# Patient Record
Sex: MALE | Race: ASIAN | NOT HISPANIC OR LATINO | URBAN - METROPOLITAN AREA
[De-identification: names, ages, dates, MRNs, and addresses within clinical notes are randomized per-mention and may not be internally consistent; named-entity substitution may affect disease eponyms.]

---

## 2018-05-25 ENCOUNTER — INPATIENT (INPATIENT)
Facility: HOSPITAL | Age: 78
LOS: 1 days | Discharge: ANOTHER HOSPITAL | DRG: 305 | End: 2018-05-27
Attending: INTERNAL MEDICINE | Admitting: INTERNAL MEDICINE
Payer: MEDICARE

## 2018-05-25 VITALS
HEART RATE: 69 BPM | TEMPERATURE: 98 F | HEIGHT: 69 IN | RESPIRATION RATE: 18 BRPM | OXYGEN SATURATION: 98 % | SYSTOLIC BLOOD PRESSURE: 111 MMHG | DIASTOLIC BLOOD PRESSURE: 66 MMHG | WEIGHT: 199.96 LBS

## 2018-05-25 DIAGNOSIS — F10.10 ALCOHOL ABUSE, UNCOMPLICATED: ICD-10-CM

## 2018-05-25 DIAGNOSIS — E87.1 HYPO-OSMOLALITY AND HYPONATREMIA: ICD-10-CM

## 2018-05-25 DIAGNOSIS — Z98.890 OTHER SPECIFIED POSTPROCEDURAL STATES: Chronic | ICD-10-CM

## 2018-05-25 DIAGNOSIS — R07.9 CHEST PAIN, UNSPECIFIED: ICD-10-CM

## 2018-05-25 DIAGNOSIS — K74.60 UNSPECIFIED CIRRHOSIS OF LIVER: ICD-10-CM

## 2018-05-25 DIAGNOSIS — I10 ESSENTIAL (PRIMARY) HYPERTENSION: ICD-10-CM

## 2018-05-25 DIAGNOSIS — M19.90 UNSPECIFIED OSTEOARTHRITIS, UNSPECIFIED SITE: ICD-10-CM

## 2018-05-25 DIAGNOSIS — N39.0 URINARY TRACT INFECTION, SITE NOT SPECIFIED: ICD-10-CM

## 2018-05-25 DIAGNOSIS — Z29.9 ENCOUNTER FOR PROPHYLACTIC MEASURES, UNSPECIFIED: ICD-10-CM

## 2018-05-25 LAB
ALBUMIN SERPL ELPH-MCNC: 2.6 G/DL — LOW (ref 3.5–5)
ALP SERPL-CCNC: 122 U/L — HIGH (ref 40–120)
ALT FLD-CCNC: 36 U/L DA — SIGNIFICANT CHANGE UP (ref 10–60)
ANION GAP SERPL CALC-SCNC: 5 MMOL/L — SIGNIFICANT CHANGE UP (ref 5–17)
APPEARANCE UR: CLEAR — SIGNIFICANT CHANGE UP
APTT BLD: 32.5 SEC — SIGNIFICANT CHANGE UP (ref 27.5–37.4)
AST SERPL-CCNC: 36 U/L — SIGNIFICANT CHANGE UP (ref 10–40)
BACTERIA # UR AUTO: ABNORMAL /HPF
BILIRUB SERPL-MCNC: 1.5 MG/DL — HIGH (ref 0.2–1.2)
BILIRUB UR-MCNC: NEGATIVE — SIGNIFICANT CHANGE UP
BUN SERPL-MCNC: 21 MG/DL — HIGH (ref 7–18)
CALCIUM SERPL-MCNC: 9.3 MG/DL — SIGNIFICANT CHANGE UP (ref 8.4–10.5)
CHLORIDE SERPL-SCNC: 102 MMOL/L — SIGNIFICANT CHANGE UP (ref 96–108)
CK MB BLD-MCNC: <5.6 % — HIGH (ref 0–3.5)
CK MB CFR SERPL CALC: <1 NG/ML — SIGNIFICANT CHANGE UP (ref 0–3.6)
CK SERPL-CCNC: 18 U/L — LOW (ref 35–232)
CO2 SERPL-SCNC: 27 MMOL/L — SIGNIFICANT CHANGE UP (ref 22–31)
COLOR SPEC: YELLOW — SIGNIFICANT CHANGE UP
CREAT SERPL-MCNC: 1.3 MG/DL — SIGNIFICANT CHANGE UP (ref 0.5–1.3)
DIFF PNL FLD: ABNORMAL
EPI CELLS # UR: ABNORMAL /HPF
GLUCOSE SERPL-MCNC: 116 MG/DL — HIGH (ref 70–99)
GLUCOSE UR QL: NEGATIVE — SIGNIFICANT CHANGE UP
HCT VFR BLD CALC: 42.6 % — SIGNIFICANT CHANGE UP (ref 39–50)
HGB BLD-MCNC: 14 G/DL — SIGNIFICANT CHANGE UP (ref 13–17)
HYALINE CASTS # UR AUTO: ABNORMAL /LPF
INR BLD: 1.79 RATIO — HIGH (ref 0.88–1.16)
KETONES UR-MCNC: NEGATIVE — SIGNIFICANT CHANGE UP
LEUKOCYTE ESTERASE UR-ACNC: ABNORMAL
LIDOCAIN IGE QN: 63 U/L — LOW (ref 73–393)
LYMPHOCYTES # BLD AUTO: 6 % — LOW (ref 13–44)
MANUAL DIF COMMENT BLD-IMP: SIGNIFICANT CHANGE UP
MCHC RBC-ENTMCNC: 29.8 PG — SIGNIFICANT CHANGE UP (ref 27–34)
MCHC RBC-ENTMCNC: 32.8 GM/DL — SIGNIFICANT CHANGE UP (ref 32–36)
MCV RBC AUTO: 91 FL — SIGNIFICANT CHANGE UP (ref 80–100)
MONOCYTES NFR BLD AUTO: 2 % — SIGNIFICANT CHANGE UP (ref 2–14)
MYELOCYTES NFR BLD: 1 % — HIGH (ref 0–0)
NEUTROPHILS NFR BLD AUTO: 91 % — HIGH (ref 43–77)
NITRITE UR-MCNC: NEGATIVE — SIGNIFICANT CHANGE UP
NT-PROBNP SERPL-SCNC: 667 PG/ML — HIGH (ref 0–450)
OB PNL STL: NEGATIVE — SIGNIFICANT CHANGE UP
PH UR: 5 — SIGNIFICANT CHANGE UP (ref 5–8)
PLAT MORPH BLD: NORMAL — SIGNIFICANT CHANGE UP
PLATELET # BLD AUTO: 193 K/UL — SIGNIFICANT CHANGE UP (ref 150–400)
POTASSIUM SERPL-MCNC: 4.8 MMOL/L — SIGNIFICANT CHANGE UP (ref 3.5–5.3)
POTASSIUM SERPL-SCNC: 4.8 MMOL/L — SIGNIFICANT CHANGE UP (ref 3.5–5.3)
PROT SERPL-MCNC: 7.3 G/DL — SIGNIFICANT CHANGE UP (ref 6–8.3)
PROT UR-MCNC: 30 MG/DL
PROTHROM AB SERPL-ACNC: 19.8 SEC — HIGH (ref 9.8–12.7)
RBC # BLD: 4.69 M/UL — SIGNIFICANT CHANGE UP (ref 4.2–5.8)
RBC # FLD: 14.8 % — HIGH (ref 10.3–14.5)
RBC BLD AUTO: NORMAL — SIGNIFICANT CHANGE UP
RBC CASTS # UR COMP ASSIST: ABNORMAL /HPF (ref 0–2)
SODIUM SERPL-SCNC: 134 MMOL/L — LOW (ref 135–145)
SP GR SPEC: 1.01 — SIGNIFICANT CHANGE UP (ref 1.01–1.02)
TROPONIN I SERPL-MCNC: <0.015 NG/ML — SIGNIFICANT CHANGE UP (ref 0–0.04)
UROBILINOGEN FLD QL: 1
WBC # BLD: 21 K/UL — HIGH (ref 3.8–10.5)
WBC # FLD AUTO: 21 K/UL — HIGH (ref 3.8–10.5)
WBC UR QL: ABNORMAL /HPF (ref 0–5)

## 2018-05-25 PROCEDURE — 99285 EMERGENCY DEPT VISIT HI MDM: CPT

## 2018-05-25 PROCEDURE — 71045 X-RAY EXAM CHEST 1 VIEW: CPT | Mod: 26

## 2018-05-25 PROCEDURE — 74177 CT ABD & PELVIS W/CONTRAST: CPT | Mod: 26

## 2018-05-25 RX ORDER — SODIUM CHLORIDE 9 MG/ML
3 INJECTION INTRAMUSCULAR; INTRAVENOUS; SUBCUTANEOUS ONCE
Qty: 0 | Refills: 0 | Status: COMPLETED | OUTPATIENT
Start: 2018-05-25 | End: 2018-05-25

## 2018-05-25 RX ORDER — HEPARIN SODIUM 5000 [USP'U]/ML
5000 INJECTION INTRAVENOUS; SUBCUTANEOUS EVERY 8 HOURS
Qty: 0 | Refills: 0 | Status: DISCONTINUED | OUTPATIENT
Start: 2018-05-25 | End: 2018-05-26

## 2018-05-25 RX ORDER — FUROSEMIDE 40 MG
20 TABLET ORAL DAILY
Qty: 0 | Refills: 0 | Status: DISCONTINUED | OUTPATIENT
Start: 2018-05-25 | End: 2018-05-26

## 2018-05-25 RX ORDER — PANTOPRAZOLE SODIUM 20 MG/1
40 TABLET, DELAYED RELEASE ORAL
Qty: 0 | Refills: 0 | Status: DISCONTINUED | OUTPATIENT
Start: 2018-05-25 | End: 2018-05-26

## 2018-05-25 RX ORDER — PROPRANOLOL HCL 160 MG
10 CAPSULE, EXTENDED RELEASE 24HR ORAL DAILY
Qty: 0 | Refills: 0 | Status: DISCONTINUED | OUTPATIENT
Start: 2018-05-25 | End: 2018-05-26

## 2018-05-25 RX ORDER — LACTULOSE 10 G/15ML
15 SOLUTION ORAL THREE TIMES A DAY
Qty: 0 | Refills: 0 | Status: DISCONTINUED | OUTPATIENT
Start: 2018-05-25 | End: 2018-05-26

## 2018-05-25 RX ORDER — CEFTRIAXONE 500 MG/1
INJECTION, POWDER, FOR SOLUTION INTRAMUSCULAR; INTRAVENOUS
Qty: 0 | Refills: 0 | Status: DISCONTINUED | OUTPATIENT
Start: 2018-05-25 | End: 2018-05-26

## 2018-05-25 RX ORDER — CEFTRIAXONE 500 MG/1
1 INJECTION, POWDER, FOR SOLUTION INTRAMUSCULAR; INTRAVENOUS ONCE
Qty: 0 | Refills: 0 | Status: COMPLETED | OUTPATIENT
Start: 2018-05-25 | End: 2018-05-25

## 2018-05-25 RX ORDER — SODIUM CHLORIDE 9 MG/ML
1000 INJECTION INTRAMUSCULAR; INTRAVENOUS; SUBCUTANEOUS
Qty: 0 | Refills: 0 | Status: DISCONTINUED | OUTPATIENT
Start: 2018-05-25 | End: 2018-05-25

## 2018-05-25 RX ORDER — CEFTRIAXONE 500 MG/1
1 INJECTION, POWDER, FOR SOLUTION INTRAMUSCULAR; INTRAVENOUS EVERY 24 HOURS
Qty: 0 | Refills: 0 | Status: DISCONTINUED | OUTPATIENT
Start: 2018-05-26 | End: 2018-05-26

## 2018-05-25 RX ORDER — PIPERACILLIN AND TAZOBACTAM 4; .5 G/20ML; G/20ML
3.38 INJECTION, POWDER, LYOPHILIZED, FOR SOLUTION INTRAVENOUS ONCE
Qty: 0 | Refills: 0 | Status: COMPLETED | OUTPATIENT
Start: 2018-05-25 | End: 2018-05-25

## 2018-05-25 RX ADMIN — PIPERACILLIN AND TAZOBACTAM 200 GRAM(S): 4; .5 INJECTION, POWDER, LYOPHILIZED, FOR SOLUTION INTRAVENOUS at 20:06

## 2018-05-25 RX ADMIN — CEFTRIAXONE 100 GRAM(S): 500 INJECTION, POWDER, FOR SOLUTION INTRAMUSCULAR; INTRAVENOUS at 23:25

## 2018-05-25 RX ADMIN — SODIUM CHLORIDE 3 MILLILITER(S): 9 INJECTION INTRAMUSCULAR; INTRAVENOUS; SUBCUTANEOUS at 17:48

## 2018-05-25 RX ADMIN — SODIUM CHLORIDE 125 MILLILITER(S): 9 INJECTION INTRAMUSCULAR; INTRAVENOUS; SUBCUTANEOUS at 17:48

## 2018-05-25 NOTE — ED PROVIDER NOTE - CARE PLAN
Principal Discharge DX:	Chest pain  Secondary Diagnosis:	Abdominal pain Principal Discharge DX:	Chest pain  Secondary Diagnosis:	Abdominal pain  Secondary Diagnosis:	Diverticulitis

## 2018-05-25 NOTE — H&P ADULT - NSHPPHYSICALEXAM_GEN_ALL_CORE
PHYSICAL EXAM:  GENERAL: NAD, obese  HEAD:  Atraumatic, Normocephalic  EYES:  Mild scleral icterus, clear conjunctiva   NECK: Supple, No JVD, Normal thyroid  CHEST/LUNG: Chest wall tenderness, Clear to auscultation. Clear to percussion bilaterally; No rales, rhonchi, wheezing, or rubs  HEART: Regular rate and rhythm; No murmurs, rubs, or gallops  ABDOMEN: Soft, Distended, LLQ, suprapubic and RLQ tenderness, Bowel sounds present, HSM cannot be appreciated due to distended abdomen, Fluid thrill positive  NERVOUS SYSTEM:  Alert & Oriented X3,    EXTREMITIES:  2+ Peripheral Pulses, No clubbing, cyanosis, or edema  SKIN: warm dry

## 2018-05-25 NOTE — H&P ADULT - PROBLEM SELECTOR PLAN 5
Last AlcoHol use was 4 weeks ago,  Not in withdrawal , no need for CIWA protocol  Follow up vitamin B12, Folate, phosphorus levels

## 2018-05-25 NOTE — ED PROVIDER NOTE - MUSCULOSKELETAL, MLM
Spine appears normal, range of motion is not limited, no muscle or joint tenderness, no CVAT, straight leg-90 deg

## 2018-05-25 NOTE — H&P ADULT - PROBLEM SELECTOR PLAN 2
Patient has history of cirrhosis and paracentesis, now presenting with worsening increased abdominal girth/ distention, on PE: labs shows high PT/INR, increased bilirubin and increased Alk phosphate-- likely worsening liver cirrhosis  -MELD score  -MEDREY score:  -On rocephin for concerns of SBO, as patient has elevated leukocytosis,  F/u cmp, Monitor for worsening abdominal distention  -c/w propanolol and lasix home dose  F/u CT abdomen with oral and IV contrast Patient has history of cirrhosis and paracentesis, now presenting with worsening increased abdominal girth/ distention, on PE: labs shows high PT/INR, increased bilirubin and increased Alk phosphate-- likely worsening liver cirrhosis  -MELD score: 19 points  -MADDREY'S DISCRIMINANT FUNCTION SCORE: 1.5 points Good prognosis, no need for steroid administration now  -On rocephin for concerns of SBO, as patient has elevated leukocytosis,  F/u cmp, Monitor for worsening abdominal distention  -c/w propanolol and lasix home dose  F/u CT abdomen with oral and IV contrast Patient has history of AlCohol induced cirrhosis and paracentesis, now presenting with worsening increased abdominal girth/ distention, on PE: labs shows high PT/INR, increased bilirubin and increased Alk phosphate-- likely worsening liver cirrhosis  -MELD score: 19 points  -MADDREY'S DISCRIMINANT FUNCTION SCORE: 1.5 points Good prognosis, no need for steroid administration now  -On rocephin for concerns of SBO, as patient has elevated leukocytosis,  F/u cmp, Monitor for worsening abdominal distention  -c/w propanolol and lasix home dose  F/u CT abdomen with oral and IV contrast

## 2018-05-25 NOTE — H&P ADULT - HISTORY OF PRESENT ILLNESS
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 It is associated with chronic worsening abdominal distention, dizziness.,. He also reports midsternal chest pain heart burn like pain, associated with sour taste, anorexia, chronic constipation. He denies fever, chills, headache, nausea, vomiting, palpitations, dyspnea, cough or palpitations, dysuria, hematuria, burning micturation. Patient states he was having chronic back pain since many days, for which he went to PCP office where his Occult stool was positive and he was being scheduled for Colonoscopy next week. He quit alcoHol many years but started drinking scotch again one month after his sister  from ovarian cancer.    In ED, Patient's vital signs were stable, Labs were remarkable for PT/INR 19.8/1.79, WBC of 21.0, sodium of 134, Total Bilirubin: 1.5, ALK phosphate: 122, UA was grossly positive for UTI, CXR shows "4 mm nodular opacity projecting over the left lateral lung base; unclear if this represents a calcified granuloma or nodule" When patient was seen by me, he was lying comfortably but was in mild distress due to pain 78 yr old M from home, Cara/ English speaking, walks with cane, with PMH of GBS (many years ago, s/p tracheostomy) , cirrhosis( diagnosed multiple years ago, s/p Paracentesis x1), ALcohol abuse, Htn, chronic pedal edema ( on furosemide), osteoarthritis, b/l inguinal hernia ( s/p surgical repair), B/l ankle fracture ( s/p metallic rods placement), chronic back pain came with c/o of B/l lower abdominal pain started 2 days ago, Patient states he developed lower abdominal pain which was gradual in onset, on LLQ and RLQ, burning in character, 5-10 in intensity, intermittent, have no radiation, aggravating/ alleviating factors. It is associated with chronic worsening abdominal distention, dizziness.,. He also reports midsternal chest pain heart burn like pain, associated with sour taste, anorexia, chronic constipation. He denies fever, chills, headache, nausea, vomiting, palpitations, dyspnea, cough or palpitations, dysuria, hematuria, burning micturation. Patient states he was having chronic back pain since many days, for which he went to PCP office where his Occult stool was positive and he was being scheduled for Colonoscopy next week. He quit alcoHol many years but started drinking scotch again few months after his sister  from ovarian cancer, Last alCohol intake is 4 weeks ago    In ED, Patient's vital signs were stable, Labs were remarkable for PT/INR 19.8/1.79, WBC of 21.0, sodium of 134, Total Bilirubin: 1.5, ALK phosphate: 122, UA was grossly positive for UTI, CXR shows "4 mm nodular opacity projecting over the left lateral lung base; unclear if this represents a calcified granuloma or nodule" When patient was seen by me, he was lying comfortably but was in mild distress due to pain

## 2018-05-25 NOTE — ED PROVIDER NOTE - OBJECTIVE STATEMENT
78 y.o. male with h/o ETOH abuse, stopped drinking alcohol for past 7-8yrs., but pt started drinking scotch 2-3 mos ago, last drank 4 weeks ago, c/o constant LLQ pain for past 2-3 days, radiated to RLQ, no n/v, last BM this min amount, pt takes Lactulose for constipation, decreased appetite, no fever, Pt also c/o MSCP for past 2-3 days, on & off, assoc with sob, @ times dizziness, no palpitation, pt saw PMD 3 weeks ago, told with occult blood, poss for colonoscopy next week.  Pt with Lt lower back pain for past 6 mos., worse with movement

## 2018-05-25 NOTE — ED PROVIDER NOTE - MEDICAL DECISION MAKING DETAILS
pt with mult complaints, chest pain, lower abd pain, h/o cirrhosis, concern for diverticulitis, ACS, will get labs, CT, admission

## 2018-05-25 NOTE — ED PROVIDER NOTE - PROGRESS NOTE DETAILS
pt with leukocytosis, worrisome for diverticulitis, chest pain, will admit pt.  Case d/w Dr. Rome pt with leukocytosis, worrisome for diverticulitis, chest pain, will admit pt.  Case d/w Dr. Rome.  CT A/P pending

## 2018-05-25 NOTE — H&P ADULT - PROBLEM SELECTOR PLAN 6
Serum Sodium is 134--> likely dilutional/ hypervolemic hyponatremia  Continue to monitor BMP  F/u Urine electrolytes

## 2018-05-25 NOTE — H&P ADULT - PROBLEM SELECTOR PLAN 1
Patient presented with RLQ and LLQ pain with Lower abdomen and suprapubic tenderness on PE, leukocytosis, UA grossly positive-- likely UTI,  F/u Urine culture  s/p zosyn and levaquin  started rocephin  F/u CT abdomen with oral and IV contrast

## 2018-05-25 NOTE — H&P ADULT - PROBLEM SELECTOR PLAN 8
RISK                                                          Points  [  ] Previous VTE                                                3  [  ] Thrombophilia                                             2  [  ] Lower limb paralysis                                   2        (unable to hold up >15 seconds)    [  ] Current Cancer                                             2         (within 6 months)  [ x ] Immobilization > 24 hrs                              1  [  ] ICU/CCU stay > 24 hours                             1  [ x ] Age > 60                                                         1    IMPROVE VTE Score: 2  heparin for VTE prophylaxis.

## 2018-05-26 DIAGNOSIS — K92.2 GASTROINTESTINAL HEMORRHAGE, UNSPECIFIED: ICD-10-CM

## 2018-05-26 DIAGNOSIS — I10 ESSENTIAL (PRIMARY) HYPERTENSION: ICD-10-CM

## 2018-05-26 DIAGNOSIS — K70.30 ALCOHOLIC CIRRHOSIS OF LIVER WITHOUT ASCITES: ICD-10-CM

## 2018-05-26 DIAGNOSIS — N39.0 URINARY TRACT INFECTION, SITE NOT SPECIFIED: ICD-10-CM

## 2018-05-26 PROBLEM — Z00.00 ENCOUNTER FOR PREVENTIVE HEALTH EXAMINATION: Status: ACTIVE | Noted: 2018-05-26

## 2018-05-26 LAB
ABO RH CONFIRMATION: SIGNIFICANT CHANGE UP
ALBUMIN SERPL ELPH-MCNC: 1.6 G/DL — LOW (ref 3.5–5)
ALBUMIN SERPL ELPH-MCNC: 2.4 G/DL — LOW (ref 3.5–5)
ALBUMIN SERPL ELPH-MCNC: 2.4 G/DL — LOW (ref 3.5–5)
ALP SERPL-CCNC: 106 U/L — SIGNIFICANT CHANGE UP (ref 40–120)
ALP SERPL-CCNC: 120 U/L — SIGNIFICANT CHANGE UP (ref 40–120)
ALP SERPL-CCNC: 32 U/L — LOW (ref 40–120)
ALT FLD-CCNC: 21 U/L DA — SIGNIFICANT CHANGE UP (ref 10–60)
ALT FLD-CCNC: 28 U/L DA — SIGNIFICANT CHANGE UP (ref 10–60)
ALT FLD-CCNC: 32 U/L DA — SIGNIFICANT CHANGE UP (ref 10–60)
ANION GAP SERPL CALC-SCNC: 10 MMOL/L — SIGNIFICANT CHANGE UP (ref 5–17)
ANION GAP SERPL CALC-SCNC: 7 MMOL/L — SIGNIFICANT CHANGE UP (ref 5–17)
ANION GAP SERPL CALC-SCNC: 8 MMOL/L — SIGNIFICANT CHANGE UP (ref 5–17)
ANION GAP SERPL CALC-SCNC: 8 MMOL/L — SIGNIFICANT CHANGE UP (ref 5–17)
APTT BLD: 34.6 SEC — SIGNIFICANT CHANGE UP (ref 27.5–37.4)
AST SERPL-CCNC: 21 U/L — SIGNIFICANT CHANGE UP (ref 10–40)
AST SERPL-CCNC: 29 U/L — SIGNIFICANT CHANGE UP (ref 10–40)
AST SERPL-CCNC: 32 U/L — SIGNIFICANT CHANGE UP (ref 10–40)
BASE EXCESS BLDV CALC-SCNC: -5.4 MMOL/L — LOW (ref -2–2)
BASOPHILS # BLD AUTO: 0 K/UL — SIGNIFICANT CHANGE UP (ref 0–0.2)
BASOPHILS # BLD AUTO: 0 K/UL — SIGNIFICANT CHANGE UP (ref 0–0.2)
BASOPHILS # BLD AUTO: 0.1 K/UL — SIGNIFICANT CHANGE UP (ref 0–0.2)
BASOPHILS # BLD AUTO: 0.1 K/UL — SIGNIFICANT CHANGE UP (ref 0–0.2)
BASOPHILS NFR BLD AUTO: 0.3 % — SIGNIFICANT CHANGE UP (ref 0–2)
BASOPHILS NFR BLD AUTO: 1.3 % — SIGNIFICANT CHANGE UP (ref 0–2)
BILIRUB SERPL-MCNC: 0.7 MG/DL — SIGNIFICANT CHANGE UP (ref 0.2–1.2)
BILIRUB SERPL-MCNC: 0.8 MG/DL — SIGNIFICANT CHANGE UP (ref 0.2–1.2)
BILIRUB SERPL-MCNC: 1.4 MG/DL — HIGH (ref 0.2–1.2)
BLOOD GAS COMMENTS, VENOUS: SIGNIFICANT CHANGE UP
BUN SERPL-MCNC: 22 MG/DL — HIGH (ref 7–18)
BUN SERPL-MCNC: 24 MG/DL — HIGH (ref 7–18)
BUN SERPL-MCNC: 33 MG/DL — HIGH (ref 7–18)
BUN SERPL-MCNC: 34 MG/DL — HIGH (ref 7–18)
CALCIUM SERPL-MCNC: 6.9 MG/DL — LOW (ref 8.4–10.5)
CALCIUM SERPL-MCNC: 7.7 MG/DL — LOW (ref 8.4–10.5)
CALCIUM SERPL-MCNC: 8.7 MG/DL — SIGNIFICANT CHANGE UP (ref 8.4–10.5)
CALCIUM SERPL-MCNC: 8.9 MG/DL — SIGNIFICANT CHANGE UP (ref 8.4–10.5)
CHLORIDE SERPL-SCNC: 102 MMOL/L — SIGNIFICANT CHANGE UP (ref 96–108)
CHLORIDE SERPL-SCNC: 103 MMOL/L — SIGNIFICANT CHANGE UP (ref 96–108)
CHLORIDE SERPL-SCNC: 109 MMOL/L — HIGH (ref 96–108)
CHLORIDE SERPL-SCNC: 114 MMOL/L — HIGH (ref 96–108)
CHOLEST SERPL-MCNC: 138 MG/DL — SIGNIFICANT CHANGE UP (ref 10–199)
CK MB BLD-MCNC: <3.7 % — HIGH (ref 0–3.5)
CK MB BLD-MCNC: <5 % — HIGH (ref 0–3.5)
CK MB CFR SERPL CALC: <1 NG/ML — SIGNIFICANT CHANGE UP (ref 0–3.6)
CK MB CFR SERPL CALC: <1 NG/ML — SIGNIFICANT CHANGE UP (ref 0–3.6)
CK SERPL-CCNC: 20 U/L — LOW (ref 35–232)
CK SERPL-CCNC: 27 U/L — LOW (ref 35–232)
CO2 SERPL-SCNC: 18 MMOL/L — LOW (ref 22–31)
CO2 SERPL-SCNC: 23 MMOL/L — SIGNIFICANT CHANGE UP (ref 22–31)
CO2 SERPL-SCNC: 24 MMOL/L — SIGNIFICANT CHANGE UP (ref 22–31)
CO2 SERPL-SCNC: 24 MMOL/L — SIGNIFICANT CHANGE UP (ref 22–31)
CREAT SERPL-MCNC: 1.1 MG/DL — SIGNIFICANT CHANGE UP (ref 0.5–1.3)
CREAT SERPL-MCNC: 1.23 MG/DL — SIGNIFICANT CHANGE UP (ref 0.5–1.3)
CREAT SERPL-MCNC: 1.51 MG/DL — HIGH (ref 0.5–1.3)
CREAT SERPL-MCNC: 1.66 MG/DL — HIGH (ref 0.5–1.3)
EOSINOPHIL # BLD AUTO: 0 K/UL — SIGNIFICANT CHANGE UP (ref 0–0.5)
EOSINOPHIL # BLD AUTO: 0.1 K/UL — SIGNIFICANT CHANGE UP (ref 0–0.5)
EOSINOPHIL NFR BLD AUTO: 0 % — SIGNIFICANT CHANGE UP (ref 0–6)
EOSINOPHIL NFR BLD AUTO: 0 % — SIGNIFICANT CHANGE UP (ref 0–6)
EOSINOPHIL NFR BLD AUTO: 0.2 % — SIGNIFICANT CHANGE UP (ref 0–6)
EOSINOPHIL NFR BLD AUTO: 1.1 % — SIGNIFICANT CHANGE UP (ref 0–6)
FOLATE SERPL-MCNC: 14.9 NG/ML — SIGNIFICANT CHANGE UP
GLUCOSE SERPL-MCNC: 119 MG/DL — HIGH (ref 70–99)
GLUCOSE SERPL-MCNC: 142 MG/DL — HIGH (ref 70–99)
GLUCOSE SERPL-MCNC: 143 MG/DL — HIGH (ref 70–99)
GLUCOSE SERPL-MCNC: 159 MG/DL — HIGH (ref 70–99)
HAV IGM SER-ACNC: SIGNIFICANT CHANGE UP
HBA1C BLD-MCNC: 5.4 % — SIGNIFICANT CHANGE UP (ref 4–5.6)
HBV CORE IGM SER-ACNC: SIGNIFICANT CHANGE UP
HBV SURFACE AG SER-ACNC: SIGNIFICANT CHANGE UP
HCO3 BLDV-SCNC: 19 MMOL/L — LOW (ref 21–29)
HCT VFR BLD CALC: 26.4 % — LOW (ref 39–50)
HCT VFR BLD CALC: 27.4 % — LOW (ref 39–50)
HCT VFR BLD CALC: 32.7 % — LOW (ref 39–50)
HCT VFR BLD CALC: 38.4 % — LOW (ref 39–50)
HCT VFR BLD CALC: 43.2 % — SIGNIFICANT CHANGE UP (ref 39–50)
HCV AB S/CO SERPL IA: 0.08 S/CO — SIGNIFICANT CHANGE UP
HCV AB SERPL-IMP: SIGNIFICANT CHANGE UP
HDLC SERPL-MCNC: 39 MG/DL — LOW (ref 40–125)
HGB BLD-MCNC: 10.3 G/DL — LOW (ref 13–17)
HGB BLD-MCNC: 12.3 G/DL — LOW (ref 13–17)
HGB BLD-MCNC: 14 G/DL — SIGNIFICANT CHANGE UP (ref 13–17)
HGB BLD-MCNC: 8.5 G/DL — LOW (ref 13–17)
HGB BLD-MCNC: 9.1 G/DL — LOW (ref 13–17)
HOROWITZ INDEX BLDV+IHG-RTO: 21 — SIGNIFICANT CHANGE UP
INR BLD: 2.34 RATIO — HIGH (ref 0.88–1.16)
LACTATE SERPL-SCNC: 1.8 MMOL/L — SIGNIFICANT CHANGE UP (ref 0.7–2)
LIPID PNL WITH DIRECT LDL SERPL: 86 MG/DL — SIGNIFICANT CHANGE UP
LYMPHOCYTES # BLD AUTO: 0.5 K/UL — LOW (ref 1–3.3)
LYMPHOCYTES # BLD AUTO: 0.8 K/UL — LOW (ref 1–3.3)
LYMPHOCYTES # BLD AUTO: 1 K/UL — SIGNIFICANT CHANGE UP (ref 1–3.3)
LYMPHOCYTES # BLD AUTO: 1.1 K/UL — SIGNIFICANT CHANGE UP (ref 1–3.3)
LYMPHOCYTES # BLD AUTO: 2.7 % — LOW (ref 13–44)
LYMPHOCYTES # BLD AUTO: 6.2 % — LOW (ref 13–44)
LYMPHOCYTES # BLD AUTO: 6.5 % — LOW (ref 13–44)
LYMPHOCYTES # BLD AUTO: 8.9 % — LOW (ref 13–44)
MAGNESIUM SERPL-MCNC: 1.5 MG/DL — LOW (ref 1.6–2.6)
MAGNESIUM SERPL-MCNC: 1.6 MG/DL — SIGNIFICANT CHANGE UP (ref 1.6–2.6)
MAGNESIUM SERPL-MCNC: 1.9 MG/DL — SIGNIFICANT CHANGE UP (ref 1.6–2.6)
MCHC RBC-ENTMCNC: 28.8 PG — SIGNIFICANT CHANGE UP (ref 27–34)
MCHC RBC-ENTMCNC: 29.1 PG — SIGNIFICANT CHANGE UP (ref 27–34)
MCHC RBC-ENTMCNC: 29.1 PG — SIGNIFICANT CHANGE UP (ref 27–34)
MCHC RBC-ENTMCNC: 29.3 PG — SIGNIFICANT CHANGE UP (ref 27–34)
MCHC RBC-ENTMCNC: 31.1 PG — SIGNIFICANT CHANGE UP (ref 27–34)
MCHC RBC-ENTMCNC: 31.4 GM/DL — LOW (ref 32–36)
MCHC RBC-ENTMCNC: 32.1 GM/DL — SIGNIFICANT CHANGE UP (ref 32–36)
MCHC RBC-ENTMCNC: 32.2 GM/DL — SIGNIFICANT CHANGE UP (ref 32–36)
MCHC RBC-ENTMCNC: 32.4 GM/DL — SIGNIFICANT CHANGE UP (ref 32–36)
MCHC RBC-ENTMCNC: 33.2 GM/DL — SIGNIFICANT CHANGE UP (ref 32–36)
MCV RBC AUTO: 90.5 FL — SIGNIFICANT CHANGE UP (ref 80–100)
MCV RBC AUTO: 90.5 FL — SIGNIFICANT CHANGE UP (ref 80–100)
MCV RBC AUTO: 90.6 FL — SIGNIFICANT CHANGE UP (ref 80–100)
MCV RBC AUTO: 91.8 FL — SIGNIFICANT CHANGE UP (ref 80–100)
MCV RBC AUTO: 93.6 FL — SIGNIFICANT CHANGE UP (ref 80–100)
MONOCYTES # BLD AUTO: 0.7 K/UL — SIGNIFICANT CHANGE UP (ref 0–0.9)
MONOCYTES # BLD AUTO: 0.7 K/UL — SIGNIFICANT CHANGE UP (ref 0–0.9)
MONOCYTES # BLD AUTO: 0.9 K/UL — SIGNIFICANT CHANGE UP (ref 0–0.9)
MONOCYTES # BLD AUTO: 1.1 K/UL — HIGH (ref 0–0.9)
MONOCYTES NFR BLD AUTO: 4.1 % — SIGNIFICANT CHANGE UP (ref 2–14)
MONOCYTES NFR BLD AUTO: 6 % — SIGNIFICANT CHANGE UP (ref 2–14)
MONOCYTES NFR BLD AUTO: 6.9 % — SIGNIFICANT CHANGE UP (ref 2–14)
MONOCYTES NFR BLD AUTO: 7.4 % — SIGNIFICANT CHANGE UP (ref 2–14)
NEUTROPHILS # BLD AUTO: 10.6 K/UL — HIGH (ref 1.8–7.4)
NEUTROPHILS # BLD AUTO: 14.2 K/UL — HIGH (ref 1.8–7.4)
NEUTROPHILS # BLD AUTO: 16.8 K/UL — HIGH (ref 1.8–7.4)
NEUTROPHILS # BLD AUTO: 9.2 K/UL — HIGH (ref 1.8–7.4)
NEUTROPHILS NFR BLD AUTO: 82.7 % — HIGH (ref 43–77)
NEUTROPHILS NFR BLD AUTO: 86.2 % — HIGH (ref 43–77)
NEUTROPHILS NFR BLD AUTO: 86.3 % — HIGH (ref 43–77)
NEUTROPHILS NFR BLD AUTO: 92.7 % — HIGH (ref 43–77)
PCO2 BLDV: 33 MMHG — LOW (ref 35–50)
PH BLDV: 7.37 — SIGNIFICANT CHANGE UP (ref 7.35–7.45)
PHOSPHATE SERPL-MCNC: 1.7 MG/DL — LOW (ref 2.5–4.5)
PHOSPHATE SERPL-MCNC: 1.8 MG/DL — LOW (ref 2.5–4.5)
PHOSPHATE SERPL-MCNC: 2 MG/DL — LOW (ref 2.5–4.5)
PLATELET # BLD AUTO: 157 K/UL — SIGNIFICANT CHANGE UP (ref 150–400)
PLATELET # BLD AUTO: 171 K/UL — SIGNIFICANT CHANGE UP (ref 150–400)
PLATELET # BLD AUTO: 171 K/UL — SIGNIFICANT CHANGE UP (ref 150–400)
PLATELET # BLD AUTO: 205 K/UL — SIGNIFICANT CHANGE UP (ref 150–400)
PLATELET # BLD AUTO: 233 K/UL — SIGNIFICANT CHANGE UP (ref 150–400)
PO2 BLDV: <43 MMHG — SIGNIFICANT CHANGE UP (ref 25–45)
POTASSIUM SERPL-MCNC: 3.1 MMOL/L — LOW (ref 3.5–5.3)
POTASSIUM SERPL-MCNC: 4.3 MMOL/L — SIGNIFICANT CHANGE UP (ref 3.5–5.3)
POTASSIUM SERPL-MCNC: 4.6 MMOL/L — SIGNIFICANT CHANGE UP (ref 3.5–5.3)
POTASSIUM SERPL-MCNC: 5.4 MMOL/L — HIGH (ref 3.5–5.3)
POTASSIUM SERPL-SCNC: 3.1 MMOL/L — LOW (ref 3.5–5.3)
POTASSIUM SERPL-SCNC: 4.3 MMOL/L — SIGNIFICANT CHANGE UP (ref 3.5–5.3)
POTASSIUM SERPL-SCNC: 4.6 MMOL/L — SIGNIFICANT CHANGE UP (ref 3.5–5.3)
POTASSIUM SERPL-SCNC: 5.4 MMOL/L — HIGH (ref 3.5–5.3)
PROT SERPL-MCNC: 4.9 G/DL — LOW (ref 6–8.3)
PROT SERPL-MCNC: 5.4 G/DL — LOW (ref 6–8.3)
PROT SERPL-MCNC: 7.1 G/DL — SIGNIFICANT CHANGE UP (ref 6–8.3)
PROTHROM AB SERPL-ACNC: 25.9 SEC — HIGH (ref 9.8–12.7)
RBC # BLD: 2.92 M/UL — LOW (ref 4.2–5.8)
RBC # BLD: 2.92 M/UL — LOW (ref 4.2–5.8)
RBC # BLD: 3.56 M/UL — LOW (ref 4.2–5.8)
RBC # BLD: 4.24 M/UL — SIGNIFICANT CHANGE UP (ref 4.2–5.8)
RBC # BLD: 4.77 M/UL — SIGNIFICANT CHANGE UP (ref 4.2–5.8)
RBC # FLD: 12.8 % — SIGNIFICANT CHANGE UP (ref 10.3–14.5)
RBC # FLD: 14.1 % — SIGNIFICANT CHANGE UP (ref 10.3–14.5)
RBC # FLD: 14.4 % — SIGNIFICANT CHANGE UP (ref 10.3–14.5)
SAO2 % BLDV: 74 % — SIGNIFICANT CHANGE UP (ref 67–88)
SODIUM SERPL-SCNC: 134 MMOL/L — LOW (ref 135–145)
SODIUM SERPL-SCNC: 135 MMOL/L — SIGNIFICANT CHANGE UP (ref 135–145)
SODIUM SERPL-SCNC: 137 MMOL/L — SIGNIFICANT CHANGE UP (ref 135–145)
SODIUM SERPL-SCNC: 144 MMOL/L — SIGNIFICANT CHANGE UP (ref 135–145)
TOTAL CHOLESTEROL/HDL RATIO MEASUREMENT: 3.5 RATIO — SIGNIFICANT CHANGE UP (ref 3.4–9.6)
TRIGL SERPL-MCNC: 64 MG/DL — SIGNIFICANT CHANGE UP (ref 10–149)
TROPONIN I SERPL-MCNC: <0.015 NG/ML — SIGNIFICANT CHANGE UP (ref 0–0.04)
TROPONIN I SERPL-MCNC: <0.015 NG/ML — SIGNIFICANT CHANGE UP (ref 0–0.04)
TSH SERPL-MCNC: 0.87 UU/ML — SIGNIFICANT CHANGE UP (ref 0.34–4.82)
VIT B12 SERPL-MCNC: 700 PG/ML — SIGNIFICANT CHANGE UP (ref 232–1245)
WBC # BLD: 11.2 K/UL — HIGH (ref 3.8–10.5)
WBC # BLD: 12.3 K/UL — HIGH (ref 3.8–10.5)
WBC # BLD: 16.4 K/UL — HIGH (ref 3.8–10.5)
WBC # BLD: 18.1 K/UL — HIGH (ref 3.8–10.5)
WBC # BLD: 20.1 K/UL — HIGH (ref 3.8–10.5)
WBC # FLD AUTO: 11.2 K/UL — HIGH (ref 3.8–10.5)
WBC # FLD AUTO: 12.3 K/UL — HIGH (ref 3.8–10.5)
WBC # FLD AUTO: 16.4 K/UL — HIGH (ref 3.8–10.5)
WBC # FLD AUTO: 18.1 K/UL — HIGH (ref 3.8–10.5)
WBC # FLD AUTO: 20.1 K/UL — HIGH (ref 3.8–10.5)

## 2018-05-26 PROCEDURE — 71045 X-RAY EXAM CHEST 1 VIEW: CPT | Mod: 26

## 2018-05-26 PROCEDURE — 99223 1ST HOSP IP/OBS HIGH 75: CPT

## 2018-05-26 PROCEDURE — 99291 CRITICAL CARE FIRST HOUR: CPT

## 2018-05-26 PROCEDURE — 74019 RADEX ABDOMEN 2 VIEWS: CPT | Mod: 26

## 2018-05-26 RX ORDER — PANTOPRAZOLE SODIUM 20 MG/1
80 TABLET, DELAYED RELEASE ORAL ONCE
Qty: 0 | Refills: 0 | Status: COMPLETED | OUTPATIENT
Start: 2018-05-26 | End: 2018-05-26

## 2018-05-26 RX ORDER — PANTOPRAZOLE SODIUM 20 MG/1
40 TABLET, DELAYED RELEASE ORAL
Qty: 0 | Refills: 0 | Status: DISCONTINUED | OUTPATIENT
Start: 2018-05-26 | End: 2018-05-26

## 2018-05-26 RX ORDER — PANTOPRAZOLE SODIUM 20 MG/1
8 TABLET, DELAYED RELEASE ORAL
Qty: 80 | Refills: 0 | Status: DISCONTINUED | OUTPATIENT
Start: 2018-05-26 | End: 2018-05-27

## 2018-05-26 RX ORDER — POTASSIUM PHOSPHATE, MONOBASIC POTASSIUM PHOSPHATE, DIBASIC 236; 224 MG/ML; MG/ML
15 INJECTION, SOLUTION INTRAVENOUS ONCE
Qty: 0 | Refills: 0 | Status: DISCONTINUED | OUTPATIENT
Start: 2018-05-26 | End: 2018-05-26

## 2018-05-26 RX ORDER — ENOXAPARIN SODIUM 100 MG/ML
100 INJECTION SUBCUTANEOUS EVERY 12 HOURS
Qty: 0 | Refills: 0 | Status: DISCONTINUED | OUTPATIENT
Start: 2018-05-26 | End: 2018-05-26

## 2018-05-26 RX ORDER — DEXTROSE 50 % IN WATER 50 %
50 SYRINGE (ML) INTRAVENOUS ONCE
Qty: 0 | Refills: 0 | Status: COMPLETED | OUTPATIENT
Start: 2018-05-26 | End: 2018-05-26

## 2018-05-26 RX ORDER — DEXTROSE MONOHYDRATE, SODIUM CHLORIDE, AND POTASSIUM CHLORIDE 50; .745; 4.5 G/1000ML; G/1000ML; G/1000ML
1000 INJECTION, SOLUTION INTRAVENOUS
Qty: 0 | Refills: 0 | Status: DISCONTINUED | OUTPATIENT
Start: 2018-05-26 | End: 2018-05-26

## 2018-05-26 RX ORDER — METOCLOPRAMIDE HCL 10 MG
10 TABLET ORAL ONCE
Qty: 0 | Refills: 0 | Status: COMPLETED | OUTPATIENT
Start: 2018-05-26 | End: 2018-05-26

## 2018-05-26 RX ORDER — SODIUM CHLORIDE 9 MG/ML
1000 INJECTION INTRAMUSCULAR; INTRAVENOUS; SUBCUTANEOUS ONCE
Qty: 0 | Refills: 0 | Status: COMPLETED | OUTPATIENT
Start: 2018-05-26 | End: 2018-05-26

## 2018-05-26 RX ORDER — IPRATROPIUM/ALBUTEROL SULFATE 18-103MCG
3 AEROSOL WITH ADAPTER (GRAM) INHALATION ONCE
Qty: 0 | Refills: 0 | Status: COMPLETED | OUTPATIENT
Start: 2018-05-26 | End: 2018-05-26

## 2018-05-26 RX ORDER — SODIUM CHLORIDE 9 MG/ML
1000 INJECTION, SOLUTION INTRAVENOUS ONCE
Qty: 0 | Refills: 0 | Status: COMPLETED | OUTPATIENT
Start: 2018-05-26 | End: 2018-05-26

## 2018-05-26 RX ORDER — SODIUM CHLORIDE 9 MG/ML
1000 INJECTION INTRAMUSCULAR; INTRAVENOUS; SUBCUTANEOUS
Qty: 0 | Refills: 0 | Status: DISCONTINUED | OUTPATIENT
Start: 2018-05-26 | End: 2018-05-26

## 2018-05-26 RX ORDER — SODIUM CHLORIDE 9 MG/ML
100 INJECTION, SOLUTION INTRAVENOUS
Qty: 0 | Refills: 0 | COMMUNITY
Start: 2018-05-26

## 2018-05-26 RX ORDER — SODIUM CHLORIDE 9 MG/ML
1000 INJECTION, SOLUTION INTRAVENOUS
Qty: 0 | Refills: 0 | Status: DISCONTINUED | OUTPATIENT
Start: 2018-05-26 | End: 2018-05-27

## 2018-05-26 RX ORDER — SODIUM CHLORIDE 9 MG/ML
1000 INJECTION, SOLUTION INTRAVENOUS
Qty: 0 | Refills: 0 | Status: DISCONTINUED | OUTPATIENT
Start: 2018-05-26 | End: 2018-05-26

## 2018-05-26 RX ORDER — PHYTONADIONE (VIT K1) 5 MG
10 TABLET ORAL ONCE
Qty: 0 | Refills: 0 | Status: COMPLETED | OUTPATIENT
Start: 2018-05-26 | End: 2018-05-26

## 2018-05-26 RX ORDER — PROTHROMBIN COMPLEX CONCENTRATE (HUMAN) 25.5; 16.5; 24; 22; 22; 26 [IU]/ML; [IU]/ML; [IU]/ML; [IU]/ML; [IU]/ML; [IU]/ML
1500 POWDER, FOR SOLUTION INTRAVENOUS ONCE
Qty: 0 | Refills: 0 | Status: DISCONTINUED | OUTPATIENT
Start: 2018-05-26 | End: 2018-05-26

## 2018-05-26 RX ORDER — INSULIN HUMAN 100 [IU]/ML
10 INJECTION, SOLUTION SUBCUTANEOUS ONCE
Qty: 0 | Refills: 0 | Status: COMPLETED | OUTPATIENT
Start: 2018-05-26 | End: 2018-05-26

## 2018-05-26 RX ORDER — ASPIRIN/CALCIUM CARB/MAGNESIUM 324 MG
81 TABLET ORAL DAILY
Qty: 0 | Refills: 0 | Status: DISCONTINUED | OUTPATIENT
Start: 2018-05-26 | End: 2018-05-26

## 2018-05-26 RX ORDER — CALCIUM GLUCONATE 100 MG/ML
1 VIAL (ML) INTRAVENOUS ONCE
Qty: 0 | Refills: 0 | Status: COMPLETED | OUTPATIENT
Start: 2018-05-26 | End: 2018-05-26

## 2018-05-26 RX ORDER — ENOXAPARIN SODIUM 100 MG/ML
90 INJECTION SUBCUTANEOUS EVERY 12 HOURS
Qty: 0 | Refills: 0 | Status: DISCONTINUED | OUTPATIENT
Start: 2018-05-26 | End: 2018-05-26

## 2018-05-26 RX ORDER — PIPERACILLIN AND TAZOBACTAM 4; .5 G/20ML; G/20ML
3.38 INJECTION, POWDER, LYOPHILIZED, FOR SOLUTION INTRAVENOUS EVERY 8 HOURS
Qty: 0 | Refills: 0 | Status: DISCONTINUED | OUTPATIENT
Start: 2018-05-26 | End: 2018-05-27

## 2018-05-26 RX ORDER — OCTREOTIDE ACETATE 200 UG/ML
50 INJECTION, SOLUTION INTRAVENOUS; SUBCUTANEOUS ONCE
Qty: 0 | Refills: 0 | Status: COMPLETED | OUTPATIENT
Start: 2018-05-26 | End: 2018-05-26

## 2018-05-26 RX ORDER — POTASSIUM CHLORIDE 20 MEQ
10 PACKET (EA) ORAL
Qty: 0 | Refills: 0 | Status: DISCONTINUED | OUTPATIENT
Start: 2018-05-26 | End: 2018-05-26

## 2018-05-26 RX ORDER — OCTREOTIDE ACETATE 200 UG/ML
25 INJECTION, SOLUTION INTRAVENOUS; SUBCUTANEOUS
Qty: 500 | Refills: 0 | Status: DISCONTINUED | OUTPATIENT
Start: 2018-05-26 | End: 2018-05-27

## 2018-05-26 RX ADMIN — Medication 30 MILLILITER(S): at 10:39

## 2018-05-26 RX ADMIN — Medication 81 MILLIGRAM(S): at 11:58

## 2018-05-26 RX ADMIN — Medication 250 MILLIGRAM(S): at 06:16

## 2018-05-26 RX ADMIN — SODIUM CHLORIDE 2000 MILLILITER(S): 9 INJECTION INTRAMUSCULAR; INTRAVENOUS; SUBCUTANEOUS at 18:53

## 2018-05-26 RX ADMIN — SODIUM CHLORIDE 2000 MILLILITER(S): 9 INJECTION, SOLUTION INTRAVENOUS at 18:16

## 2018-05-26 RX ADMIN — PANTOPRAZOLE SODIUM 40 MILLIGRAM(S): 20 TABLET, DELAYED RELEASE ORAL at 07:48

## 2018-05-26 RX ADMIN — LACTULOSE 15 GRAM(S): 10 SOLUTION ORAL at 06:44

## 2018-05-26 RX ADMIN — SODIUM CHLORIDE 100 MILLILITER(S): 9 INJECTION INTRAMUSCULAR; INTRAVENOUS; SUBCUTANEOUS at 19:20

## 2018-05-26 RX ADMIN — PANTOPRAZOLE SODIUM 10 MG/HR: 20 TABLET, DELAYED RELEASE ORAL at 21:43

## 2018-05-26 RX ADMIN — ENOXAPARIN SODIUM 90 MILLIGRAM(S): 100 INJECTION SUBCUTANEOUS at 06:43

## 2018-05-26 RX ADMIN — Medication 250 MILLIGRAM(S): at 04:16

## 2018-05-26 RX ADMIN — Medication 10 MILLIGRAM(S): at 06:43

## 2018-05-26 RX ADMIN — Medication 10 MILLIGRAM(S): at 22:13

## 2018-05-26 RX ADMIN — Medication 20 MILLIGRAM(S): at 06:43

## 2018-05-26 RX ADMIN — LACTULOSE 15 GRAM(S): 10 SOLUTION ORAL at 13:56

## 2018-05-26 RX ADMIN — PANTOPRAZOLE SODIUM 80 MILLIGRAM(S): 20 TABLET, DELAYED RELEASE ORAL at 18:42

## 2018-05-26 RX ADMIN — OCTREOTIDE ACETATE 5 MICROGRAM(S)/HR: 200 INJECTION, SOLUTION INTRAVENOUS; SUBCUTANEOUS at 21:44

## 2018-05-26 RX ADMIN — Medication 10 MILLIGRAM(S): at 22:14

## 2018-05-26 RX ADMIN — Medication 100 MILLIEQUIVALENT(S): at 22:13

## 2018-05-26 RX ADMIN — Medication 102 MILLIGRAM(S): at 19:53

## 2018-05-26 RX ADMIN — PIPERACILLIN AND TAZOBACTAM 25 GRAM(S): 4; .5 INJECTION, POWDER, LYOPHILIZED, FOR SOLUTION INTRAVENOUS at 21:43

## 2018-05-26 RX ADMIN — OCTREOTIDE ACETATE 50 MICROGRAM(S): 200 INJECTION, SOLUTION INTRAVENOUS; SUBCUTANEOUS at 19:07

## 2018-05-26 RX ADMIN — LACTULOSE 15 GRAM(S): 10 SOLUTION ORAL at 04:16

## 2018-05-26 NOTE — CONSULT NOTE ADULT - ASSESSMENT
78 yr old M from home, Cara/ English speaking, walks with cane, with PMH of GBS (many years ago, s/p tracheostomy) , cirrhosis( diagnosed multiple years ago, s/p Paracentesis x1), ALcohol abuse, Htn, chronic pedal edema ( on furosemide), osteoarthritis, b/l inguinal hernia ( s/p surgical repair), B/l ankle fracture ( s/p metallic rods placement), chronic back pain came with c/o of B/l lower abdominal pain started 3 days ago.  Patient was admitted to floor for UTI and worsening cirhosis. Pt was started on full odse lovenox and ASA this AM @ 6am by overnight team for chest paiun and new onset Afib  Today pt had 2 episodes of BRBPR and 1 episode of coffee ground emesis now with hypotension SBP 77/47 mm Hg. Pt also c/o dizziness when he tried getting up.  Pt being accepted to ICU for GI bleed with hypotension 77/47 likely sec to upper GI bleed from full anticoagulation and cirrhosis.

## 2018-05-26 NOTE — PROGRESS NOTE ADULT - ASSESSMENT
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 fall precaution

## 2018-05-26 NOTE — CONSULT NOTE ADULT - PROBLEM SELECTOR RECOMMENDATION 3
Patient presented with RLQ and LLQ pain with Lower abdomen and suprapubic tenderness on PE, leukocytosis, UA grossly positive-- likely UTI,  F/u Urine culture  strated zosyn as pyelo on ct abd

## 2018-05-26 NOTE — CONSULT NOTE ADULT - ATTENDING COMMENTS
MICU ATTENDING   I have personally seen and examined the patient. I agree with the above history, physical and plan which I have reviewed and edited as appropriate. I was physically present for the key elements service provided. I total spent 35 min critical care time   79 y/o with multiple med problem admitted with UTI. Hospital course complicated by new onset A/Fi , pt was started on LOvenox. LAst dose today at 6 am. Became hypotensive with hematemesis ground vomiting  and BRBPR. Responded to hydration, transferred to ICU fro further Mx.    A/P Hypotension   GI bleed   Anemia   Coagulopathy   Cirrhosis with ascites  Pyelonephritis  MICU NPO  D/C ASA, Lovenox   Transfuse FFP, plt   monitor CBC q 6 hours, transfuse PRBC if necessary  IV protonix, Octreotide  GI for emergency endoscopy   Changed antibiotics to Zosyn  Cont with hydration

## 2018-05-26 NOTE — ACUTE INTERFACILITY TRANSFER NOTE - HOSPITAL COURSE
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 It is associated with chronic worsening abdominal distention, dizziness.,. He also reports midsternal chest pain heart burn like pain, associated with sour taste, anorexia, chronic constipation. He denies fever, chills, headache, nausea, vomiting, palpitations, dyspnea, cough or palpitations, dysuria, hematuria, burning micturation. Patient states he was having chronic back pain since many days, for which he went to PCP office where his Occult stool was positive and he was being scheduled for Colonoscopy next week. He quit alcoHol many years but started drinking scotch again few months after his sister  from ovarian cancer, Last alCohol intake is 4 weeks ago    BRIEF HOSPITAL COURSE: Patient was admitted to floor for UTI and worsening cirhosis. Pt was started on full odse lovenox and ASA this AM @ 6am by overnight team for chest paiun and new onset Afib  Today pt had 2 episodes of BRBPR and 1 episode of coffee ground emesis now with hypotension SBP 77/47 mm Hg. Pt also c/o dizziness when he tried getting up.    For Acute GI bleeding, pt has BRBPR x2 and coffee ground emesisx3 with hypotension 77/47 likely sec to upper GI bleed from full anticoagulation and cirrhosis. Continued maintenance fluids, giving blood products, 1 PRBC, 4 FFP, 1 platelet. Protonix drip. Octreotide drip. GI Dr. Morse was consulted for GI.    ICU consulted for hypovolemic shock sec to GI bleed. CT Abd with oral and iv contrast showed high grade stenosis of SMA. Discontinued ASA, full dose lovenox which were started last night for new afib and naproxen also d/cong. NPO with sump tube. Hem onc Dr. Garg. Jennasyn for UTI. Giving another 2 PRBC.    For Alcoholic cirrhosis, has history of Alcohol induced cirrhosis and paracentesis, now presenting with worsening increased abdominal girth/ distention, on PE: labs shows high PT/INR, increased bilirubin and increased Alk phosphate-- likely worsening liver cirrhosis. MELD score: 19 points. MADDREY'S DISCRIMINANT FUNCTION SCORE: 1.5 points. zosyn.      For Urinary tract infection without hematuria, site unspecified, presented with RLQ and LLQ pain with Lower abdomen and suprapubic tenderness on PE, leukocytosis, UA grossly positive-- likely UTI, F/u Urine culture. On zosyn. For essential hypertension,  BP low sec to gi bleedheld propranolol.     For: Alcohol abuse, Last AlcoHol use was 4 weeks ago, Not in withdrawal , no need for CIWA protocol.     For Prophylactic measure, IMPROVE score 2, but pt has gi bleed so no chem AC, SCD for now for dvt ppx.    For transfer to [facility] for EGD and further workup. 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 It is associated with chronic worsening abdominal distention, dizziness.,. He also reports midsternal chest pain heart burn like pain, associated with sour taste, anorexia, chronic constipation. He denies fever, chills, headache, nausea, vomiting, palpitations, dyspnea, cough or palpitations, dysuria, hematuria, burning micturation. Patient states he was having chronic back pain since many days, for which he went to PCP office where his Occult stool was positive and he was being scheduled for Colonoscopy next week. He quit alcoHol many years but started drinking scotch again few months after his sister  from ovarian cancer, Last alCohol intake is 4 weeks ago    BRIEF HOSPITAL COURSE: Patient was admitted to floor for UTI and worsening cirhosis. Pt was started on full odse lovenox and ASA this AM @ 6am by overnight team for chest paiun and new onset Afib  Today pt had 2 episodes of BRBPR and 1 episode of coffee ground emesis now with hypotension SBP 77/47 mm Hg. Pt also c/o dizziness when he tried getting up.    For Acute GI bleeding, pt has BRBPR x2 and coffee ground emesisx3 with hypotension 77/47 likely sec to upper GI bleed from full anticoagulation and cirrhosis. Continued maintenance fluids, giving blood products, 1 PRBC, 4 FFP, 1 platelet. Protonix drip. Octreotide drip. GI Dr. Morse was consulted for GI.    ICU consulted for hypovolemic shock sec to GI bleed. CT Abd with oral and iv contrast showed high grade stenosis of SMA. Discontinued ASA, full dose lovenox which were started last night for new afib and naproxen also d/cong. NPO with sump tube. Hem onc Dr. Garg. Zosyn for UTI. Giving another 2 PRBC. Noted to have 1 episode of hematochezia. RIJ central line placed for access.    Noted to have  Moderate to high-grade stenosis of the proximal SMA. Celiac and MERVIN patent. Extensive aortic calcifications. Vascular was consulted, recommends mesenteric doppler and repeat CT abd/pelvis with IV contrast when renal function improves.    For Alcoholic cirrhosis, has history of Alcohol induced cirrhosis and paracentesis, now presenting with worsening increased abdominal girth/ distention, on PE: labs shows high PT/INR, increased bilirubin and increased Alk phosphate-- likely worsening liver cirrhosis. MELD score: 19 points. MADDREY'S DISCRIMINANT FUNCTION SCORE: 1.5 points. zosyn.      For Urinary tract infection without hematuria, site unspecified, presented with RLQ and LLQ pain with Lower abdomen and suprapubic tenderness on PE, leukocytosis, UA grossly positive-- likely UTI, F/u Urine culture. On zosyn. For essential hypertension,  BP low sec to gi bleedheld propranolol.     For: Alcohol abuse, Last AlcoHol use was 4 weeks ago, Not in withdrawal , no need for CIWA protocol.     For Prophylactic measure, IMPROVE score 2, but pt has gi bleed so no chem AC, SCD for now for dvt ppx.    For transfer to [facility] for EGD and further workup. 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 It is associated with chronic worsening abdominal distention, dizziness.,. He also reports midsternal chest pain heart burn like pain, associated with sour taste, anorexia, chronic constipation. He denies fever, chills, headache, nausea, vomiting, palpitations, dyspnea, cough or palpitations, dysuria, hematuria, burning micturation. Patient states he was having chronic back pain since many days, for which he went to PCP office where his Occult stool was positive and he was being scheduled for Colonoscopy next week. He quit alcoHol many years but started drinking scotch again few months after his sister  from ovarian cancer, Last alCohol intake is 4 weeks ago    BRIEF HOSPITAL COURSE: Patient was admitted to floor for UTI and worsening cirhosis. Pt was started on full odse lovenox and ASA this AM @ 6am by overnight team for chest paiun and new onset Afib  Today pt had 2 episodes of BRBPR and 1 episode of coffee ground emesis now with hypotension SBP 77/47 mm Hg. Pt also c/o dizziness when he tried getting up.    For Acute GI bleeding, pt has BRBPR x2 and coffee ground emesisx3 with hypotension 77/47 likely sec to upper GI bleed from full anticoagulation and cirrhosis. Got 4 L bolus. Continued maintenance fluids, giving blood products, 2 PRBC, 3 FFP, 1 platelet. Protonix drip. Octreotide drip. GI Dr. Morse was consulted for GI.  ICU consulted for hypovolemic shock sec to GI bleed. CT Abd with oral and iv contrast showed high grade stenosis of SMA. Discontinued ASA, full dose lovenox which were started last night for new afib and naproxen also d/cong. NPO with sump tube draining ~ 500cc coffee ground emesis. Hem onc Dr. Garg. Jennasyn for UTI. Noted to have 1 episode of hematochezia and melena. RIJ central line placed for access.    Noted to have  Moderate to high-grade stenosis of the proximal SMA. Celiac and MERVIN patent. Extensive aortic calcifications. Vascular was consulted, recommends mesenteric doppler and repeat CT abd/pelvis with IV contrast when renal function improves.    For Alcoholic cirrhosis, has history of Alcohol induced cirrhosis and paracentesis, now presenting with worsening increased abdominal girth/ distention, on PE: labs shows high PT/INR, increased bilirubin and increased Alk phosphate-- likely worsening liver cirrhosis. MELD score: 19 points. MADDREY'S DISCRIMINANT FUNCTION SCORE: 1.5 points. zosyn.      For Urinary tract infection without hematuria, site unspecified, presented with RLQ and LLQ pain with Lower abdomen and suprapubic tenderness on PE, leukocytosis, UA grossly positive-- likely UTI, F/u Urine culture. On zosyn. For essential hypertension,  BP low sec to gi bleedheld propranolol.     For: Alcohol abuse, Last AlcoHol use was 4 weeks ago, Not in withdrawal , no need for CIWA protocol.     For Prophylactic measure, IMPROVE score 2, but pt has gi bleed so no chem AC, SCD for now for dvt ppx.    For transfer to [facility] for EGD and further workup.

## 2018-05-26 NOTE — ACUTE INTERFACILITY TRANSFER NOTE - SECONDARY DIAGNOSIS.
Alcoholic cirrhosis, unspecified whether ascites present Gastritis HTN (hypertension) UTI (urinary tract infection) Guillain-Lenox syndrome

## 2018-05-26 NOTE — PROCEDURE NOTE - NSPROCDETAILS_GEN_ALL_CORE
guidewire recovered/sterile dressing applied/lumen(s) aspirated and flushed/sterile technique, catheter placed/ultrasound guidance

## 2018-05-26 NOTE — CONSULT NOTE ADULT - SUBJECTIVE AND OBJECTIVE BOX
Patient is a 78y old  Male who presents with a chief complaint of Lower abdominal pain, midsternal chest pain (25 May 2018 20:06)      Initial HPI on admission:  HPI:  78 yr old M from home, Cara/ English speaking, walks with cane, with PMH of GBS (many years ago, s/p tracheostomy) , cirrhosis( diagnosed multiple years ago, s/p Paracentesis x1), ALcohol abuse, Htn, chronic pedal edema ( on furosemide), osteoarthritis, b/l inguinal hernia ( s/p surgical repair), B/l ankle fracture ( s/p metallic rods placement), chronic back pain came with c/o of B/l lower abdominal pain started 2 days ago, Patient states he developed lower abdominal pain which was gradual in onset, on LLQ and RLQ, burning in character, 5-10 in intensity, intermittent, have no radiation, aggravating/ alleviating factors. It is associated with chronic worsening abdominal distention, dizziness.,. He also reports midsternal chest pain heart burn like pain, associated with sour taste, anorexia, chronic constipation. He denies fever, chills, headache, nausea, vomiting, palpitations, dyspnea, cough or palpitations, dysuria, hematuria, burning micturation. Patient states he was having chronic back pain since many days, for which he went to PCP office where his Occult stool was positive and he was being scheduled for Colonoscopy next week. He quit alcoHol many years but started drinking scotch again few months after his sister  from ovarian cancer, Last alCohol intake is 4 weeks ago    In ED, Patient's vital signs were stable, Labs were remarkable for PT/INR 19.8/1.79, WBC of 21.0, sodium of 134, Total Bilirubin: 1.5, ALK phosphate: 122, UA was grossly positive for UTI, CXR shows "4 mm nodular opacity projecting over the left lateral lung base; unclear if this represents a calcified granuloma or nodule" When patient was seen by me, he was lying comfortably but was in mild distress due to pain (25 May 2018 20:06)      BRIEF HOSPITAL COURSE: Patient was admitted to floor for UTI and worsening cirhosis. Pt was started on full odse lovenox and ASA this AM @ 6am by overnight team for chest paiun and new onset Afib  Today pt had 2 episodes of BRBPR and 1 episode of coffee ground emesis now with hypotension SBP 77/47 mm Hg. Pt also c/o dizziness when he tried getting up.    Review of Systems:  CONSTITUTIONAL: No fever, chills, or fatigue  EYES: No eye pain, visual disturbances, or discharge  ENMT:  No difficulty hearing, tinnitus, vertigo; No sinus or throat pain  NECK: No pain or stiffness  RESPIRATORY: No cough, wheezing, chills or hemoptysis; No shortness of breath  CARDIOVASCULAR: No chest pain, palpitations, dizziness, or leg swelling  GASTROINTESTINAL: No abdominal or epigastric pain. No nausea, vomiting, or hematemesis; No diarrhea or constipation. No melena or hematochezia.  GENITOURINARY: No dysuria, frequency, hematuria, or incontinence  NEUROLOGICAL: No headaches, memory loss, loss of strength, numbness, or tremors  SKIN: No itching, burning, rashes, or lesions   MUSCULOSKELETAL: No joint pain or swelling; No muscle, back, or extremity pain  PSYCHIATRIC: No depression, anxiety, mood swings, or difficulty sleeping    PAST MEDICAL & SURGICAL HISTORY:  Liver cirrhosis  Guillain-Gill syndrome  HTN (hypertension)  H/O inguinal hernia repair    Allergies    Allergy Status Unknown  No Known Allergies    Intolerances      FAMILY HISTORY:  Family history of ovarian cancer (Sibling)    Home Medication: ****  Social history : ***        Medications:  octreotide  Infusion 25 MICROgram(s)/Hr IV Continuous <Continuous>  pantoprazole  Injectable 40 milliGRAM(s) IV Push two times a day  phytonadione  IVPB 10 milliGRAM(s) IV Intermittent once  piperacillin/tazobactam IVPB. 3.375 Gram(s) IV Intermittent every 8 hours  sodium chloride 0.9%. 1000 milliLiter(s) IV Continuous <Continuous>      O2 supplementation/vent settings      Vital Signs Last 24 Hrs  T(C): 37 (26 May 2018 18:35), Max: 37.2 (26 May 2018 08:50)  T(F): 98.6 (26 May 2018 18:35), Max: 98.9 (26 May 2018 08:50)  HR: 67 (26 May 2018 19:03) (61 - 119)  BP: 92/55 (26 May 2018 19:03) (77/47 - 148/72)  BP(mean): --  RR: 16 (26 May 2018 19:03) (16 - 18)  SpO2: 100% (26 May 2018 19:03) (97% - 100%)              LABS:                        12.3   20.1  )-----------( 233      ( 26 May 2018 18:15 )             38.4         135  |  103  |  34<H>  ----------------------------<  142<H>  4.6   |  24  |  1.66<H>    Ca    8.7      26 May 2018 18:15  Phos  1.8       Mg     1.5         TPro  7.1  /  Alb  2.4<L>  /  TBili  1.4<H>  /  DBili  x   /  AST  32  /  ALT  32  /  AlkPhos  120        CARDIAC MARKERS ( 26 May 2018 13:44 )  <0.015 ng/mL / x     / 27 U/L / x     / <1.0 ng/mL  CARDIAC MARKERS ( 26 May 2018 07:22 )  <0.015 ng/mL / x     / 20 U/L / x     / <1.0 ng/mL  CARDIAC MARKERS ( 25 May 2018 16:15 )  <0.015 ng/mL / x     / 18 U/L / x     / <1.0 ng/mL      CAPILLARY BLOOD GLUCOSE        PT/INR - ( 26 May 2018 18:40 )   PT: 25.9 sec;   INR: 2.34 ratio         PTT - ( 26 May 2018 18:40 )  PTT:34.6 sec  Urinalysis Basic - ( 25 May 2018 19:37 )    Color: Yellow / Appearance: Clear / S.015 / pH: x  Gluc: x / Ketone: Negative  / Bili: Negative / Urobili: 1   Blood: x / Protein: 30 mg/dL / Nitrite: Negative   Leuk Esterase: Moderate / RBC: 5-10 /HPF / WBC 26-50 /HPF   Sq Epi: x / Non Sq Epi: Moderate /HPF / Bacteria: Many /HPF      CULTURES:        Physical Examination:    General: No acute distress.      HEENT: Pupils equal, reactive to light.  Symmetric.    PULM: Clear to auscultation bilaterally, no significant sputum production    CVS: Regular rate and rhythm, no murmurs, rubs, or gallops    ABD: Soft, nondistended, nontender, normoactive bowel sounds, no masses    EXT: No edema, nontender    SKIN: Warm and well perfused, no rashes noted.    NEURO: Alert, oriented, interactive, nonfocal    RADIOLOGY  ***    EKG *** Patient is a 78y old  Male who presents with a chief complaint of Lower abdominal pain, midsternal chest pain (25 May 2018 20:06)      Initial HPI on admission:  HPI:  78 yr old M from home, [place]/ English speaking, walks with cane, with PMH of GBS (many years ago, s/p tracheostomy) , cirrhosis( diagnosed multiple years ago, s/p Paracentesis x1), ALcohol abuse, Htn, chronic pedal edema ( on furosemide), osteoarthritis, b/l inguinal hernia ( s/p surgical repair), B/l ankle fracture ( s/p metallic rods placement), chronic back pain came with c/o of B/l lower abdominal pain started 2 days ago, Patient states he developed lower abdominal pain which was gradual in onset, on LLQ and RLQ, burning in character, 5-10 in intensity, intermittent, have no radiation, aggravating/ alleviating factors. It is associated with chronic worsening abdominal distention, dizziness.,. He also reports midsternal chest pain heart burn like pain, associated with sour taste, anorexia, chronic constipation. He denies fever, chills, headache, nausea, vomiting, palpitations, dyspnea, cough or palpitations, dysuria, hematuria, burning micturation. Patient states he was having chronic back pain since many days, for which he went to PCP office where his Occult stool was positive and he was being scheduled for Colonoscopy next week. He quit alcoHol many years but started drinking scotch again few months after his sister  from ovarian cancer, Last alCohol intake is 4 weeks ago        BRIEF HOSPITAL COURSE: Patient was admitted to floor for UTI and worsening cirhosis. Pt was started on full odse lovenox and ASA this AM @ 6am by overnight team for chest paiun and new onset Afib  Today pt had 2 episodes of BRBPR and 1 episode of coffee ground emesis now with hypotension SBP 77/47 mm Hg. Pt also c/o dizziness when he tried getting up.    Review of Systems:  CONSTITUTIONAL: No fever, chills, or fatigue  EYES: No eye pain, visual disturbances, or discharge  ENMT:  No difficulty hearing, tinnitus, vertigo; No sinus or throat pain  NECK: No pain or stiffness  RESPIRATORY: No cough, wheezing, chills or hemoptysis; No shortness of breath  CARDIOVASCULAR: No chest pain, palpitations, dizziness, or leg swelling  GASTROINTESTINAL:  lower abdominal  pain, bright red bleeding per rectum and coffee ground emesis . No nausea, vomiting, or hematemesis; No diarrhea or constipation.   GENITOURINARY: No dysuria, frequency, hematuria, or incontinence  NEUROLOGICAL: No headaches, memory loss, loss of strength, numbness, or tremors  SKIN: No itching, burning, rashes, or lesions   MUSCULOSKELETAL: No joint pain or swelling; No muscle, back, or extremity pain  PSYCHIATRIC: No depression, anxiety, mood swings, or difficulty sleeping    PAST MEDICAL & SURGICAL HISTORY:  Liver cirrhosis  Guillain-[place] syndrome  HTN (hypertension)  H/O inguinal hernia repair    Allergies    Allergy Status Unknown  No Known Allergies    Intolerances      FAMILY HISTORY:  Family history of ovarian cancer (Sibling)      Medications:  octreotide  Infusion 25 MICROgram(s)/Hr IV Continuous <Continuous>  pantoprazole  Injectable 40 milliGRAM(s) IV Push two times a day  phytonadione  IVPB 10 milliGRAM(s) IV Intermittent once  piperacillin/tazobactam IVPB. 3.375 Gram(s) IV Intermittent every 8 hours  sodium chloride 0.9%. 1000 milliLiter(s) IV Continuous <Continuous>      O2 supplementation/vent settings      Vital Signs Last 24 Hrs  T(C): 37 (26 May 2018 18:35), Max: 37.2 (26 May 2018 08:50)  T(F): 98.6 (26 May 2018 18:35), Max: 98.9 (26 May 2018 08:50)  HR: 67 (26 May 2018 19:03) (61 - 119)  BP: 92/55 (26 May 2018 19:03) (77/47 - 148/72)  BP(mean): --  RR: 16 (26 May 2018 19:03) (16 - 18)  SpO2: 100% (26 May 2018 19:03) (97% - 100%)              LABS:                        12.3   20.1  )-----------( 233      ( 26 May 2018 18:15 )             38.4         135  |  103  |  34<H>  ----------------------------<  142<H>  4.6   |  24  |  1.66<H>    Ca    8.7      26 May 2018 18:15  Phos  1.8       Mg     1.5         TPro  7.1  /  Alb  2.4<L>  /  TBili  1.4<H>  /  DBili  x   /  AST  32  /  ALT  32  /  AlkPhos  120        CARDIAC MARKERS ( 26 May 2018 13:44 )  <0.015 ng/mL / x     / 27 U/L / x     / <1.0 ng/mL  CARDIAC MARKERS ( 26 May 2018 07:22 )  <0.015 ng/mL / x     / 20 U/L / x     / <1.0 ng/mL  CARDIAC MARKERS ( 25 May 2018 16:15 )  <0.015 ng/mL / x     / 18 U/L / x     / <1.0 ng/mL      CAPILLARY BLOOD GLUCOSE        PT/INR - ( 26 May 2018 18:40 )   PT: 25.9 sec;   INR: 2.34 ratio         PTT - ( 26 May 2018 18:40 )  PTT:34.6 sec  Urinalysis Basic - ( 25 May 2018 19:37 )    Color: Yellow / Appearance: Clear / S.015 / pH: x  Gluc: x / Ketone: Negative  / Bili: Negative / Urobili: 1   Blood: x / Protein: 30 mg/dL / Nitrite: Negative   Leuk Esterase: Moderate / RBC: 5-10 /HPF / WBC 26-50 /HPF   Sq Epi: x / Non Sq Epi: Moderate /HPF / Bacteria: Many /HPF      CULTURES:        Physical Examination:    General: No acute distress.      HEENT: Pupils equal, reactive to light.  Symmetric.    PULM: Clear to auscultation bilaterally, no significant sputum production    CVS: tachy, Regular rate and rhythm, no murmurs, rubs, or gallops    ABD: Soft, distended, tenderness in b/l Lower quadrants, normoactive bowel sounds    EXT: No edema, nontender    SKIN: no rashes    NEURO: Alert, oriented, interactive, nonfocal    RADIOLOGY   < from: CT Abdomen and Pelvis w/ Oral Cont and w/ IV Cont (18 @ 22:48) >  IMPRESSION:     Right lower pole renal lesion. The differential includes pyelonephritis   and neoplasm. Urinalysis correlation and one to two-week follow-up   contrast enhanced MRI to ensure resolution is recommended. SMA high grade stenosis noted    Cholelithiasis and porcelain gallbladder. Cirrhosis. Pancreatic versus   distal common bile duct stone, if warranted MRCP/MRI contrast may be   considered.      < end of copied text >

## 2018-05-26 NOTE — CONSULT NOTE ADULT - PROBLEM SELECTOR RECOMMENDATION 2
Patient has history of Alcohol induced cirrhosis and paracentesis, now presenting with worsening increased abdominal girth/ distention, on PE: labs shows high PT/INR, increased bilirubin and increased Alk phosphate-- likely worsening liver cirrhosis  -MELD score: 19 points  -MADDREY'S DISCRIMINANT FUNCTION SCORE: 1.5 points   changed rocephin to zosyn  monitor

## 2018-05-26 NOTE — ACUTE INTERFACILITY TRANSFER NOTE - PLAN OF CARE
to get EGD and evaluation for upper GI bleed protonix drip, octreotide drip, NO nsaids or aspirin, holding chemical prophylaxis; s/p 1PRBC, 4 FFP, 1 platelet transfer to [facility] for further evaluation avoid NSAIDs, transfer to [facility] for further evaluation holding anti-hypertensives in setting of GI bleed and hypotension c/w zosyn, f/u urine culture outpatient follow up

## 2018-05-26 NOTE — PROGRESS NOTE ADULT - SUBJECTIVE AND OBJECTIVE BOX
Patient is a 78y old  Male who presents with a chief complaint of Lower abdominal pain, midsternal chest pain (25 May 2018 20:06), weight loss      INTERVAL HPI/OVERNIGHT EVENTS:  T(C): 37.1 (18 @ 12:39), Max: 37.2 (18 @ 08:50)  HR: 66 (18 @ 12:39) (51 - 69)  BP: 110/63 (18 @ 12:39) (99/59 - 148/72)  RR: 18 (18 @ 12:39) (16 - 18)  SpO2: 99% (18 @ 12:39) (98% - 99%)  Wt(kg): --    LABS:                        14.0   18.1  )-----------( 171      ( 26 May 2018 05:33 )             43.2         134<L>  |  102  |  22<H>  ----------------------------<  143<H>  4.3   |  24  |  1.23    Ca    8.9      26 May 2018 05:33  Phos  1.8       Mg     1.5         TPro  7.1  /  Alb  2.4<L>  /  TBili  1.4<H>  /  DBili  x   /  AST  32  /  ALT  32  /  AlkPhos  120  -    PT/INR - ( 25 May 2018 16:15 )   PT: 19.8 sec;   INR: 1.79 ratio         PTT - ( 25 May 2018 16:15 )  PTT:32.5 sec  Urinalysis Basic - ( 25 May 2018 19:37 )    Color: Yellow / Appearance: Clear / S.015 / pH: x  Gluc: x / Ketone: Negative  / Bili: Negative / Urobili: 1   Blood: x / Protein: 30 mg/dL / Nitrite: Negative   Leuk Esterase: Moderate / RBC: 5-10 /HPF / WBC 26-50 /HPF   Sq Epi: x / Non Sq Epi: Moderate /HPF / Bacteria: Many /HPF      CAPILLARY BLOOD GLUCOSE            RADIOLOGY & ADDITIONAL TESTS:  < from: CT Abdomen and Pelvis w/ Oral Cont and w/ IV Cont (18 @ 22:48) >  ABDOMEN:  LIVER: Cirrhosis  BILE DUCTS: Normal caliber. 0.5 cm stone either in the pancreatic or   distal common bile duct.  GALLBLADDER: Cholelithiasis and porcelain gallbladder.  PANCREAS: Within normal limits  SPLEEN: Within normal limits  ADRENALS: Within normal limits  KIDNEYS: 2.7 cm ill-defined low-density lesion lower pole right kidney.   Mild perinephric fat stranding. 1.4 cm partially exophytic hypodensity   upper pole left kidney with surrounding fat stranding. Both are   indeterminate. No hydronephrosis.    PELVIS:  REPRODUCTIVE ORGANS: No pelvic masses  BLADDER: Within normal limits    PERITONEUM:No ascites, no free air.  BOWEL: Within normal limits. Normal appendix.  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 Celiac and   MERVIN patent. Extensive aortic calcifications.  RETROPERITONEUM: No retroperitoneal or pelvic adenopathy. 4 cm cystic   structure right pelvic sidewall, question Prolene plug repair of the   inguinal hernia. In the absence of such known instrumentation the lesion   is nonspecific and may be further evaluated with contrast-enhanced MRI.  ABDOMINAL WALL: Small fat-containing umbilical hernia. 3.3 cm soft tissue   lesion right rectus abdominis, possibly a desmoid. Comparison to prior   studies and follow-up with contrast-enhanced MRI may be considered.  MUSCULOSKELETAL: Within normal limits    IMPRESSION:     Right lower pole renal lesion. The differential includes pyelonephritis   and neoplasm. Urinalysis correlation and one to two-week follow-up   contrast enhanced MRI to ensure resolution is recommended.    Cholelithiasis and porcelain gallbladder. Cirrhosis. Pancreatic versus   distal common bile duct stone, if warranted MRCP/MRI contrast may be   considered.    < end of copied text >    Consultant(s) Notes Reviewed:  [x ] YES  [ ] NO    PHYSICAL EXAM:  GENERAL: well built, well nourished  HEAD:  Atraumatic, Normocephalic  EYES: EOMI, PERRLA, conjunctiva and sclera clear  ENT: No tonsillar erythema, exudates, or enlargement; Moist mucous membranes, Good dentition, No lesions  NECK: Supple, No JVD, Normal thyroid, no enlarged nodes  NERVOUS SYSTEM:  Alert & Oriented X3, Good concentration; Motor Strength 5/5 B/L upper and lower extremities; DTRs 2+ intact and symmetric, sensory intact  CHEST/LUNG: B/L good air entry; No rales, rhonchi, or wheezing  HEART: S1S2 normal, no S3, Regular rate and rhythm; No murmurs, rubs, or gallops  ABDOMEN: Soft, Nontender, mild distented  EXTREMITIES:  2+ Peripheral Pulses, No clubbing, cyanosis, or edema  LYMPH: No lymphadenopathy noted  SKIN: No rashes or lesions    Care Discussed with Consultants/Other Providers [ x] YES  [ ] NO

## 2018-05-26 NOTE — CHART NOTE - NSCHARTNOTEFT_GEN_A_CORE
Patient complained of chest pain at around midnight, was seen at bedside, not in any distress, says left sided, worse with palpation, moderate in intensity, non radiating. EKG repeated, looks like new onset Afib, rate controlled. Discussed with PGY 3 on call, started on aspirin, full dose Lovenox and cardiac enzymes ordered. Continue to monitor on tele. On call team to follow up in am.

## 2018-05-26 NOTE — CONSULT NOTE ADULT - PROBLEM SELECTOR RECOMMENDATION 9
pt has BRBPR x2 and coffee ground emesisx1 with hypotension 77/47 likely sec to upper GI bleed from full anticoagulation and cirrhosis.  Pt has only 1 peripheral IV line, got a second iv line  started NS bolus 2 litres  Pt on protonix 40 iv bid  started octreotide drip as concern for variceal bleed  GI Dr. Morse and Attending is made aware, possible EGD after stabilization of pt.  ICU consulted for hypovolemic shock sec to GI bleed  CT Abd with oral and iv contrast showed high grade stenosis of SMA  Discontinued ASA, full dose lovenox which were started last night for new afib and naproxen also d/cong.  d/cong lasix , propranolol and lactulose resume as appropriate  strict NPO for now  REPEAT INR is eleVATED 2.3  will give 2 FFPs, 1 platelet and will repeat INR as per Hem onc Dr. Garg  IVF for hydration  Repeated CBC hb dropped from 14 to 12 and hemoconcentrated.  Monitor CBC Q6  changed rocephin to zosyn as pt has pyelo on ct abd and GI bleed ppx  Discussed Plan with Intensivist

## 2018-05-27 ENCOUNTER — INPATIENT (INPATIENT)
Facility: HOSPITAL | Age: 78
LOS: 10 days | Discharge: HOME HEALTH | End: 2018-06-07
Attending: INTERNAL MEDICINE | Admitting: INTERNAL MEDICINE
Payer: MEDICARE

## 2018-05-27 VITALS
RESPIRATION RATE: 21 BRPM | DIASTOLIC BLOOD PRESSURE: 67 MMHG | OXYGEN SATURATION: 100 % | HEART RATE: 125 BPM | SYSTOLIC BLOOD PRESSURE: 98 MMHG | WEIGHT: 195.99 LBS | TEMPERATURE: 97 F | HEIGHT: 69 IN

## 2018-05-27 VITALS
RESPIRATION RATE: 25 BRPM | OXYGEN SATURATION: 100 % | HEART RATE: 65 BPM | SYSTOLIC BLOOD PRESSURE: 122 MMHG | DIASTOLIC BLOOD PRESSURE: 86 MMHG

## 2018-05-27 DIAGNOSIS — Z98.890 OTHER SPECIFIED POSTPROCEDURAL STATES: Chronic | ICD-10-CM

## 2018-05-27 DIAGNOSIS — K92.2 GASTROINTESTINAL HEMORRHAGE, UNSPECIFIED: ICD-10-CM

## 2018-05-27 LAB
ALBUMIN SERPL ELPH-MCNC: 2.3 G/DL — LOW (ref 3.3–5)
ALBUMIN SERPL ELPH-MCNC: 2.5 G/DL — LOW (ref 3.5–5)
ALP SERPL-CCNC: 40 U/L — SIGNIFICANT CHANGE UP (ref 40–120)
ALP SERPL-CCNC: 68 U/L — SIGNIFICANT CHANGE UP (ref 40–120)
ALT FLD-CCNC: 14 U/L — SIGNIFICANT CHANGE UP (ref 4–41)
ALT FLD-CCNC: 20 U/L DA — SIGNIFICANT CHANGE UP (ref 10–60)
ANION GAP SERPL CALC-SCNC: 8 MMOL/L — SIGNIFICANT CHANGE UP (ref 5–17)
APTT BLD: 25.1 SEC — LOW (ref 27.5–37.4)
APTT BLD: 28.9 SEC — SIGNIFICANT CHANGE UP (ref 27.5–37.4)
APTT BLD: 30.1 SEC — SIGNIFICANT CHANGE UP (ref 27.5–37.4)
AST SERPL-CCNC: 21 U/L — SIGNIFICANT CHANGE UP (ref 10–40)
AST SERPL-CCNC: 22 U/L — SIGNIFICANT CHANGE UP (ref 4–40)
BASOPHILS # BLD AUTO: 0.01 K/UL — SIGNIFICANT CHANGE UP (ref 0–0.2)
BASOPHILS NFR BLD AUTO: 0.1 % — SIGNIFICANT CHANGE UP (ref 0–2)
BASOPHILS NFR SPEC: 0 % — SIGNIFICANT CHANGE UP (ref 0–2)
BILIRUB DIRECT SERPL-MCNC: 1.1 MG/DL — HIGH (ref 0.1–0.2)
BILIRUB SERPL-MCNC: 0.8 MG/DL — SIGNIFICANT CHANGE UP (ref 0.2–1.2)
BILIRUB SERPL-MCNC: 2.1 MG/DL — HIGH (ref 0.2–1.2)
BLD GP AB SCN SERPL QL: NEGATIVE — SIGNIFICANT CHANGE UP
BUN SERPL-MCNC: 19 MG/DL — HIGH (ref 7–18)
BUN SERPL-MCNC: 39 MG/DL — HIGH (ref 7–23)
BUN SERPL-MCNC: 41 MG/DL — HIGH (ref 7–23)
CALCIUM SERPL-MCNC: 7.3 MG/DL — LOW (ref 8.4–10.5)
CALCIUM SERPL-MCNC: 7.4 MG/DL — LOW (ref 8.4–10.5)
CALCIUM SERPL-MCNC: 7.5 MG/DL — LOW (ref 8.4–10.5)
CHLORIDE SERPL-SCNC: 102 MMOL/L — SIGNIFICANT CHANGE UP (ref 98–107)
CHLORIDE SERPL-SCNC: 104 MMOL/L — SIGNIFICANT CHANGE UP (ref 98–107)
CHLORIDE SERPL-SCNC: 109 MMOL/L — HIGH (ref 96–108)
CK MB BLD-MCNC: <2.7 % — SIGNIFICANT CHANGE UP (ref 0–3.5)
CK MB CFR SERPL CALC: <1 NG/ML — SIGNIFICANT CHANGE UP (ref 0–3.6)
CK SERPL-CCNC: 37 U/L — SIGNIFICANT CHANGE UP (ref 35–232)
CO2 SERPL-SCNC: 21 MMOL/L — LOW (ref 22–31)
CO2 SERPL-SCNC: 22 MMOL/L — SIGNIFICANT CHANGE UP (ref 22–31)
CO2 SERPL-SCNC: 24 MMOL/L — SIGNIFICANT CHANGE UP (ref 22–31)
CREAT SERPL-MCNC: 1.12 MG/DL — SIGNIFICANT CHANGE UP (ref 0.5–1.3)
CREAT SERPL-MCNC: 1.35 MG/DL — HIGH (ref 0.5–1.3)
CREAT SERPL-MCNC: 1.46 MG/DL — HIGH (ref 0.5–1.3)
EOSINOPHIL # BLD AUTO: 0 K/UL — SIGNIFICANT CHANGE UP (ref 0–0.5)
EOSINOPHIL NFR BLD AUTO: 0 % — SIGNIFICANT CHANGE UP (ref 0–6)
EOSINOPHIL NFR FLD: 0 % — SIGNIFICANT CHANGE UP (ref 0–6)
GLUCOSE SERPL-MCNC: 122 MG/DL — HIGH (ref 70–99)
GLUCOSE SERPL-MCNC: 212 MG/DL — HIGH (ref 70–99)
GLUCOSE SERPL-MCNC: 228 MG/DL — HIGH (ref 70–99)
HBA1C BLD-MCNC: 5.5 % — SIGNIFICANT CHANGE UP (ref 4–5.6)
HCT VFR BLD CALC: 20.3 % — CRITICAL LOW (ref 39–50)
HCT VFR BLD CALC: 23.1 % — LOW (ref 39–50)
HCT VFR BLD CALC: 23.3 % — LOW (ref 39–50)
HCT VFR BLD CALC: 23.7 % — LOW (ref 39–50)
HCT VFR BLD CALC: 27.2 % — LOW (ref 39–50)
HCT VFR BLD CALC: 41.7 % — SIGNIFICANT CHANGE UP (ref 39–50)
HGB BLD-MCNC: 14.1 G/DL — SIGNIFICANT CHANGE UP (ref 13–17)
HGB BLD-MCNC: 6.9 G/DL — CRITICAL LOW (ref 13–17)
HGB BLD-MCNC: 8.1 G/DL — LOW (ref 13–17)
HGB BLD-MCNC: 9.3 G/DL — LOW (ref 13–17)
HYPOCHROMIA BLD QL: SLIGHT — SIGNIFICANT CHANGE UP
IMM GRANULOCYTES # BLD AUTO: 0.09 # — SIGNIFICANT CHANGE UP
IMM GRANULOCYTES NFR BLD AUTO: 1.2 % — SIGNIFICANT CHANGE UP (ref 0–1.5)
INR BLD: 1.61 — HIGH (ref 0.88–1.17)
INR BLD: 1.66 — HIGH (ref 0.88–1.17)
INR BLD: 1.72 RATIO — HIGH (ref 0.88–1.16)
LACTATE SERPL-SCNC: 1.3 MMOL/L — SIGNIFICANT CHANGE UP (ref 0.7–2)
LG PLATELETS BLD QL AUTO: SLIGHT — SIGNIFICANT CHANGE UP
LYMPHOCYTES # BLD AUTO: 0.31 K/UL — LOW (ref 1–3.3)
LYMPHOCYTES # BLD AUTO: 4.2 % — LOW (ref 13–44)
LYMPHOCYTES NFR SPEC AUTO: 3.3 % — LOW (ref 13–44)
MACROCYTES BLD QL: SLIGHT — SIGNIFICANT CHANGE UP
MAGNESIUM SERPL-MCNC: 1.7 MG/DL — SIGNIFICANT CHANGE UP (ref 1.6–2.6)
MAGNESIUM SERPL-MCNC: 1.7 MG/DL — SIGNIFICANT CHANGE UP (ref 1.6–2.6)
MAGNESIUM SERPL-MCNC: 2.1 MG/DL — SIGNIFICANT CHANGE UP (ref 1.6–2.6)
MANUAL SMEAR VERIFICATION: SIGNIFICANT CHANGE UP
MCHC RBC-ENTMCNC: 28.8 PG — SIGNIFICANT CHANGE UP (ref 27–34)
MCHC RBC-ENTMCNC: 29.1 PG — SIGNIFICANT CHANGE UP (ref 27–34)
MCHC RBC-ENTMCNC: 29.2 PG — SIGNIFICANT CHANGE UP (ref 27–34)
MCHC RBC-ENTMCNC: 29.7 PG — SIGNIFICANT CHANGE UP (ref 27–34)
MCHC RBC-ENTMCNC: 29.7 PG — SIGNIFICANT CHANGE UP (ref 27–34)
MCHC RBC-ENTMCNC: 30 PG — SIGNIFICANT CHANGE UP (ref 27–34)
MCHC RBC-ENTMCNC: 33.8 % — SIGNIFICANT CHANGE UP (ref 32–36)
MCHC RBC-ENTMCNC: 34 % — SIGNIFICANT CHANGE UP (ref 32–36)
MCHC RBC-ENTMCNC: 34.2 % — SIGNIFICANT CHANGE UP (ref 32–36)
MCHC RBC-ENTMCNC: 34.2 % — SIGNIFICANT CHANGE UP (ref 32–36)
MCHC RBC-ENTMCNC: 34.8 % — SIGNIFICANT CHANGE UP (ref 32–36)
MCHC RBC-ENTMCNC: 35.1 % — SIGNIFICANT CHANGE UP (ref 32–36)
MCV RBC AUTO: 84.6 FL — SIGNIFICANT CHANGE UP (ref 80–100)
MCV RBC AUTO: 85.1 FL — SIGNIFICANT CHANGE UP (ref 80–100)
MCV RBC AUTO: 85.3 FL — SIGNIFICANT CHANGE UP (ref 80–100)
MCV RBC AUTO: 88.3 FL — SIGNIFICANT CHANGE UP (ref 80–100)
MONOCYTES # BLD AUTO: 0.35 K/UL — SIGNIFICANT CHANGE UP (ref 0–0.9)
MONOCYTES NFR BLD AUTO: 4.8 % — SIGNIFICANT CHANGE UP (ref 2–14)
MONOCYTES NFR BLD: 3.3 % — SIGNIFICANT CHANGE UP (ref 2–9)
NEUTROPHIL AB SER-ACNC: 93.4 % — HIGH (ref 43–77)
NEUTROPHILS # BLD AUTO: 6.56 K/UL — SIGNIFICANT CHANGE UP (ref 1.8–7.4)
NEUTROPHILS NFR BLD AUTO: 89.7 % — HIGH (ref 43–77)
NRBC # BLD: 0 /100WBC — SIGNIFICANT CHANGE UP
NRBC # FLD: 0 — SIGNIFICANT CHANGE UP
PHOSPHATE SERPL-MCNC: 2.6 MG/DL — SIGNIFICANT CHANGE UP (ref 2.5–4.5)
PHOSPHATE SERPL-MCNC: 2.9 MG/DL — SIGNIFICANT CHANGE UP (ref 2.5–4.5)
PHOSPHATE SERPL-MCNC: 3.1 MG/DL — SIGNIFICANT CHANGE UP (ref 2.5–4.5)
PLATELET # BLD AUTO: 146 K/UL — LOW (ref 150–400)
PLATELET # BLD AUTO: 170 K/UL — SIGNIFICANT CHANGE UP (ref 150–400)
PLATELET # BLD AUTO: 177 K/UL — SIGNIFICANT CHANGE UP (ref 150–400)
PLATELET # BLD AUTO: 178 K/UL — SIGNIFICANT CHANGE UP (ref 150–400)
PLATELET # BLD AUTO: 189 K/UL — SIGNIFICANT CHANGE UP (ref 150–400)
PLATELET # BLD AUTO: 97 K/UL — LOW (ref 150–400)
PLATELET COUNT - ESTIMATE: SIGNIFICANT CHANGE UP
PMV BLD: 10.6 FL — SIGNIFICANT CHANGE UP (ref 7–13)
PMV BLD: 10.7 FL — SIGNIFICANT CHANGE UP (ref 7–13)
PMV BLD: 10.8 FL — SIGNIFICANT CHANGE UP (ref 7–13)
PMV BLD: 10.8 FL — SIGNIFICANT CHANGE UP (ref 7–13)
PMV BLD: 10.9 FL — SIGNIFICANT CHANGE UP (ref 7–13)
PMV BLD: 10.9 FL — SIGNIFICANT CHANGE UP (ref 7–13)
POTASSIUM SERPL-MCNC: 4 MMOL/L — SIGNIFICANT CHANGE UP (ref 3.5–5.3)
POTASSIUM SERPL-MCNC: 4.3 MMOL/L — SIGNIFICANT CHANGE UP (ref 3.5–5.3)
POTASSIUM SERPL-MCNC: 4.6 MMOL/L — SIGNIFICANT CHANGE UP (ref 3.5–5.3)
POTASSIUM SERPL-SCNC: 4 MMOL/L — SIGNIFICANT CHANGE UP (ref 3.5–5.3)
POTASSIUM SERPL-SCNC: 4.3 MMOL/L — SIGNIFICANT CHANGE UP (ref 3.5–5.3)
POTASSIUM SERPL-SCNC: 4.6 MMOL/L — SIGNIFICANT CHANGE UP (ref 3.5–5.3)
PROT SERPL-MCNC: 5.2 G/DL — LOW (ref 6–8.3)
PROT SERPL-MCNC: 5.6 G/DL — LOW (ref 6–8.3)
PROTHROM AB SERPL-ACNC: 18.1 SEC — HIGH (ref 9.8–13.1)
PROTHROM AB SERPL-ACNC: 18.6 SEC — HIGH (ref 9.8–13.1)
PROTHROM AB SERPL-ACNC: 19 SEC — HIGH (ref 9.8–12.7)
RBC # BLD: 2.3 M/UL — LOW (ref 4.2–5.8)
RBC # BLD: 2.73 M/UL — LOW (ref 4.2–5.8)
RBC # BLD: 2.73 M/UL — LOW (ref 4.2–5.8)
RBC # BLD: 2.78 M/UL — LOW (ref 4.2–5.8)
RBC # BLD: 3.19 M/UL — LOW (ref 4.2–5.8)
RBC # BLD: 4.9 M/UL — SIGNIFICANT CHANGE UP (ref 4.2–5.8)
RBC # FLD: 14.3 % — SIGNIFICANT CHANGE UP (ref 10.3–14.5)
RBC # FLD: 14.5 % — SIGNIFICANT CHANGE UP (ref 10.3–14.5)
RBC # FLD: 15.9 % — HIGH (ref 10.3–14.5)
RBC # FLD: 16.1 % — HIGH (ref 10.3–14.5)
RBC # FLD: 16.2 % — HIGH (ref 10.3–14.5)
RBC # FLD: 16.4 % — HIGH (ref 10.3–14.5)
RH IG SCN BLD-IMP: POSITIVE — SIGNIFICANT CHANGE UP
RH IG SCN BLD-IMP: POSITIVE — SIGNIFICANT CHANGE UP
SODIUM SERPL-SCNC: 135 MMOL/L — SIGNIFICANT CHANGE UP (ref 135–145)
SODIUM SERPL-SCNC: 137 MMOL/L — SIGNIFICANT CHANGE UP (ref 135–145)
SODIUM SERPL-SCNC: 141 MMOL/L — SIGNIFICANT CHANGE UP (ref 135–145)
TROPONIN I SERPL-MCNC: <0.015 NG/ML — SIGNIFICANT CHANGE UP (ref 0–0.04)
WBC # BLD: 13.39 K/UL — HIGH (ref 3.8–10.5)
WBC # BLD: 14.04 K/UL — HIGH (ref 3.8–10.5)
WBC # BLD: 14.17 K/UL — HIGH (ref 3.8–10.5)
WBC # BLD: 14.5 K/UL — HIGH (ref 3.8–10.5)
WBC # BLD: 15.88 K/UL — HIGH (ref 3.8–10.5)
WBC # BLD: 7.32 K/UL — SIGNIFICANT CHANGE UP (ref 3.8–10.5)
WBC # FLD AUTO: 13.39 K/UL — HIGH (ref 3.8–10.5)
WBC # FLD AUTO: 14.04 K/UL — HIGH (ref 3.8–10.5)
WBC # FLD AUTO: 14.17 K/UL — HIGH (ref 3.8–10.5)
WBC # FLD AUTO: 14.5 K/UL — HIGH (ref 3.8–10.5)
WBC # FLD AUTO: 15.88 K/UL — HIGH (ref 3.8–10.5)
WBC # FLD AUTO: 7.32 K/UL — SIGNIFICANT CHANGE UP (ref 3.8–10.5)

## 2018-05-27 PROCEDURE — 82803 BLOOD GASES ANY COMBINATION: CPT

## 2018-05-27 PROCEDURE — 86900 BLOOD TYPING SEROLOGIC ABO: CPT

## 2018-05-27 PROCEDURE — 87086 URINE CULTURE/COLONY COUNT: CPT

## 2018-05-27 PROCEDURE — 80053 COMPREHEN METABOLIC PANEL: CPT

## 2018-05-27 PROCEDURE — 85730 THROMBOPLASTIN TIME PARTIAL: CPT

## 2018-05-27 PROCEDURE — 31500 INSERT EMERGENCY AIRWAY: CPT | Mod: GC

## 2018-05-27 PROCEDURE — 83735 ASSAY OF MAGNESIUM: CPT

## 2018-05-27 PROCEDURE — 82607 VITAMIN B-12: CPT

## 2018-05-27 PROCEDURE — 71045 X-RAY EXAM CHEST 1 VIEW: CPT

## 2018-05-27 PROCEDURE — 86901 BLOOD TYPING SEROLOGIC RH(D): CPT

## 2018-05-27 PROCEDURE — 36430 TRANSFUSION BLD/BLD COMPNT: CPT

## 2018-05-27 PROCEDURE — 86850 RBC ANTIBODY SCREEN: CPT

## 2018-05-27 PROCEDURE — 80048 BASIC METABOLIC PNL TOTAL CA: CPT

## 2018-05-27 PROCEDURE — 82553 CREATINE MB FRACTION: CPT

## 2018-05-27 PROCEDURE — 84484 ASSAY OF TROPONIN QUANT: CPT

## 2018-05-27 PROCEDURE — 83036 HEMOGLOBIN GLYCOSYLATED A1C: CPT

## 2018-05-27 PROCEDURE — 86923 COMPATIBILITY TEST ELECTRIC: CPT

## 2018-05-27 PROCEDURE — 71045 X-RAY EXAM CHEST 1 VIEW: CPT | Mod: 26

## 2018-05-27 PROCEDURE — 80074 ACUTE HEPATITIS PANEL: CPT

## 2018-05-27 PROCEDURE — 85610 PROTHROMBIN TIME: CPT

## 2018-05-27 PROCEDURE — 96375 TX/PRO/DX INJ NEW DRUG ADDON: CPT

## 2018-05-27 PROCEDURE — 82746 ASSAY OF FOLIC ACID SERUM: CPT

## 2018-05-27 PROCEDURE — P9040: CPT

## 2018-05-27 PROCEDURE — 99291 CRITICAL CARE FIRST HOUR: CPT | Mod: 25

## 2018-05-27 PROCEDURE — 96374 THER/PROPH/DIAG INJ IV PUSH: CPT | Mod: XU

## 2018-05-27 PROCEDURE — P9037: CPT

## 2018-05-27 PROCEDURE — 87040 BLOOD CULTURE FOR BACTERIA: CPT

## 2018-05-27 PROCEDURE — 84100 ASSAY OF PHOSPHORUS: CPT

## 2018-05-27 PROCEDURE — 81001 URINALYSIS AUTO W/SCOPE: CPT

## 2018-05-27 PROCEDURE — P9059: CPT

## 2018-05-27 PROCEDURE — 80061 LIPID PANEL: CPT

## 2018-05-27 PROCEDURE — 93005 ELECTROCARDIOGRAM TRACING: CPT

## 2018-05-27 PROCEDURE — 83880 ASSAY OF NATRIURETIC PEPTIDE: CPT

## 2018-05-27 PROCEDURE — 84443 ASSAY THYROID STIM HORMONE: CPT

## 2018-05-27 PROCEDURE — 99291 CRITICAL CARE FIRST HOUR: CPT

## 2018-05-27 PROCEDURE — 87186 SC STD MICRODIL/AGAR DIL: CPT

## 2018-05-27 PROCEDURE — 85027 COMPLETE CBC AUTOMATED: CPT

## 2018-05-27 PROCEDURE — 74177 CT ABD & PELVIS W/CONTRAST: CPT

## 2018-05-27 PROCEDURE — 99285 EMERGENCY DEPT VISIT HI MDM: CPT | Mod: 25

## 2018-05-27 PROCEDURE — 82272 OCCULT BLD FECES 1-3 TESTS: CPT

## 2018-05-27 PROCEDURE — 83605 ASSAY OF LACTIC ACID: CPT

## 2018-05-27 PROCEDURE — 82550 ASSAY OF CK (CPK): CPT

## 2018-05-27 PROCEDURE — 74019 RADEX ABDOMEN 2 VIEWS: CPT

## 2018-05-27 PROCEDURE — 83690 ASSAY OF LIPASE: CPT

## 2018-05-27 RX ORDER — PANTOPRAZOLE SODIUM 20 MG/1
8 TABLET, DELAYED RELEASE ORAL
Qty: 0 | Refills: 0 | COMMUNITY
Start: 2018-05-27

## 2018-05-27 RX ORDER — MIDAZOLAM HYDROCHLORIDE 1 MG/ML
2 INJECTION, SOLUTION INTRAMUSCULAR; INTRAVENOUS ONCE
Qty: 0 | Refills: 0 | Status: DISCONTINUED | OUTPATIENT
Start: 2018-05-27 | End: 2018-05-27

## 2018-05-27 RX ORDER — FENTANYL CITRATE 50 UG/ML
50 INJECTION INTRAVENOUS ONCE
Qty: 0 | Refills: 0 | Status: DISCONTINUED | OUTPATIENT
Start: 2018-05-27 | End: 2018-05-27

## 2018-05-27 RX ORDER — PIPERACILLIN AND TAZOBACTAM 4; .5 G/20ML; G/20ML
3.38 INJECTION, POWDER, LYOPHILIZED, FOR SOLUTION INTRAVENOUS EVERY 8 HOURS
Qty: 0 | Refills: 0 | Status: DISCONTINUED | OUTPATIENT
Start: 2018-05-27 | End: 2018-05-28

## 2018-05-27 RX ORDER — PHENYLEPHRINE HYDROCHLORIDE 10 MG/ML
0.1 INJECTION INTRAVENOUS
Qty: 160 | Refills: 0 | Status: DISCONTINUED | OUTPATIENT
Start: 2018-05-27 | End: 2018-05-27

## 2018-05-27 RX ORDER — NOREPINEPHRINE BITARTRATE/D5W 8 MG/250ML
0.05 PLASTIC BAG, INJECTION (ML) INTRAVENOUS
Qty: 8 | Refills: 0 | Status: DISCONTINUED | OUTPATIENT
Start: 2018-05-27 | End: 2018-05-29

## 2018-05-27 RX ORDER — PROPOFOL 10 MG/ML
40 INJECTION, EMULSION INTRAVENOUS
Qty: 1000 | Refills: 0 | Status: DISCONTINUED | OUTPATIENT
Start: 2018-05-27 | End: 2018-05-28

## 2018-05-27 RX ORDER — PROPOFOL 10 MG/ML
3 INJECTION, EMULSION INTRAVENOUS ONCE
Qty: 0 | Refills: 0 | Status: DISCONTINUED | OUTPATIENT
Start: 2018-05-27 | End: 2018-05-27

## 2018-05-27 RX ORDER — OCTREOTIDE ACETATE 200 UG/ML
50 INJECTION, SOLUTION INTRAVENOUS; SUBCUTANEOUS
Qty: 500 | Refills: 0 | Status: DISCONTINUED | OUTPATIENT
Start: 2018-05-27 | End: 2018-05-29

## 2018-05-27 RX ORDER — PIPERACILLIN AND TAZOBACTAM 4; .5 G/20ML; G/20ML
3.38 INJECTION, POWDER, LYOPHILIZED, FOR SOLUTION INTRAVENOUS
Qty: 0 | Refills: 0 | COMMUNITY
Start: 2018-05-27

## 2018-05-27 RX ORDER — OCTREOTIDE ACETATE 200 UG/ML
25 INJECTION, SOLUTION INTRAVENOUS; SUBCUTANEOUS
Qty: 0 | Refills: 0 | COMMUNITY
Start: 2018-05-27

## 2018-05-27 RX ORDER — LIDOCAINE 4 G/100G
1 CREAM TOPICAL ONCE
Qty: 0 | Refills: 0 | Status: COMPLETED | OUTPATIENT
Start: 2018-05-27 | End: 2018-05-27

## 2018-05-27 RX ORDER — PROPOFOL 10 MG/ML
20 INJECTION, EMULSION INTRAVENOUS ONCE
Qty: 0 | Refills: 0 | Status: COMPLETED | OUTPATIENT
Start: 2018-05-27 | End: 2018-05-27

## 2018-05-27 RX ORDER — MIDAZOLAM HYDROCHLORIDE 1 MG/ML
2 INJECTION, SOLUTION INTRAMUSCULAR; INTRAVENOUS DAILY
Qty: 0 | Refills: 0 | Status: DISCONTINUED | OUTPATIENT
Start: 2018-05-27 | End: 2018-05-27

## 2018-05-27 RX ORDER — PROPOFOL 10 MG/ML
30 INJECTION, EMULSION INTRAVENOUS ONCE
Qty: 0 | Refills: 0 | Status: COMPLETED | OUTPATIENT
Start: 2018-05-27 | End: 2018-05-27

## 2018-05-27 RX ORDER — PANTOPRAZOLE SODIUM 20 MG/1
8 TABLET, DELAYED RELEASE ORAL
Qty: 80 | Refills: 0 | Status: DISCONTINUED | OUTPATIENT
Start: 2018-05-27 | End: 2018-05-29

## 2018-05-27 RX ORDER — PANTOPRAZOLE SODIUM 20 MG/1
80 TABLET, DELAYED RELEASE ORAL
Qty: 0 | Refills: 0 | Status: DISCONTINUED | OUTPATIENT
Start: 2018-05-27 | End: 2018-05-27

## 2018-05-27 RX ORDER — ONDANSETRON 8 MG/1
8 TABLET, FILM COATED ORAL ONCE
Qty: 0 | Refills: 0 | Status: COMPLETED | OUTPATIENT
Start: 2018-05-27 | End: 2018-05-27

## 2018-05-27 RX ORDER — PANTOPRAZOLE SODIUM 20 MG/1
8 TABLET, DELAYED RELEASE ORAL
Qty: 80 | Refills: 0 | Status: DISCONTINUED | OUTPATIENT
Start: 2018-05-27 | End: 2018-05-27

## 2018-05-27 RX ORDER — SODIUM CHLORIDE 9 MG/ML
500 INJECTION, SOLUTION INTRAVENOUS ONCE
Qty: 0 | Refills: 0 | Status: COMPLETED | OUTPATIENT
Start: 2018-05-27 | End: 2018-05-27

## 2018-05-27 RX ORDER — CALCIUM GLUCONATE 100 MG/ML
1 VIAL (ML) INTRAVENOUS ONCE
Qty: 0 | Refills: 0 | Status: COMPLETED | OUTPATIENT
Start: 2018-05-27 | End: 2018-05-27

## 2018-05-27 RX ORDER — PANTOPRAZOLE SODIUM 20 MG/1
40 TABLET, DELAYED RELEASE ORAL
Qty: 0 | Refills: 0 | Status: DISCONTINUED | OUTPATIENT
Start: 2018-05-27 | End: 2018-05-27

## 2018-05-27 RX ADMIN — FENTANYL CITRATE 50 MICROGRAM(S): 50 INJECTION INTRAVENOUS at 02:57

## 2018-05-27 RX ADMIN — PROPOFOL 30 MILLIGRAM(S): 10 INJECTION, EMULSION INTRAVENOUS at 11:53

## 2018-05-27 RX ADMIN — Medication 8.33 MICROGRAM(S)/KG/MIN: at 09:11

## 2018-05-27 RX ADMIN — PROPOFOL 21.34 MICROGRAM(S)/KG/MIN: 10 INJECTION, EMULSION INTRAVENOUS at 16:50

## 2018-05-27 RX ADMIN — MIDAZOLAM HYDROCHLORIDE 2 MILLIGRAM(S): 1 INJECTION, SOLUTION INTRAMUSCULAR; INTRAVENOUS at 12:30

## 2018-05-27 RX ADMIN — MIDAZOLAM HYDROCHLORIDE 2 MILLIGRAM(S): 1 INJECTION, SOLUTION INTRAMUSCULAR; INTRAVENOUS at 13:25

## 2018-05-27 RX ADMIN — Medication 200 GRAM(S): at 03:23

## 2018-05-27 RX ADMIN — PIPERACILLIN AND TAZOBACTAM 25 GRAM(S): 4; .5 INJECTION, POWDER, LYOPHILIZED, FOR SOLUTION INTRAVENOUS at 06:38

## 2018-05-27 RX ADMIN — PIPERACILLIN AND TAZOBACTAM 25 GRAM(S): 4; .5 INJECTION, POWDER, LYOPHILIZED, FOR SOLUTION INTRAVENOUS at 14:44

## 2018-05-27 RX ADMIN — OCTREOTIDE ACETATE 2 MICROGRAM(S)/HR: 200 INJECTION, SOLUTION INTRAVENOUS; SUBCUTANEOUS at 02:57

## 2018-05-27 RX ADMIN — OCTREOTIDE ACETATE 10 MICROGRAM(S)/HR: 200 INJECTION, SOLUTION INTRAVENOUS; SUBCUTANEOUS at 16:49

## 2018-05-27 RX ADMIN — PROPOFOL 21.34 MICROGRAM(S)/KG/MIN: 10 INJECTION, EMULSION INTRAVENOUS at 21:42

## 2018-05-27 RX ADMIN — MIDAZOLAM HYDROCHLORIDE 2 MILLIGRAM(S): 1 INJECTION, SOLUTION INTRAMUSCULAR; INTRAVENOUS at 11:50

## 2018-05-27 RX ADMIN — FENTANYL CITRATE 50 MICROGRAM(S): 50 INJECTION INTRAVENOUS at 03:24

## 2018-05-27 RX ADMIN — PROPOFOL 20 MILLIGRAM(S): 10 INJECTION, EMULSION INTRAVENOUS at 12:00

## 2018-05-27 RX ADMIN — PANTOPRAZOLE SODIUM 10 MG/HR: 20 TABLET, DELAYED RELEASE ORAL at 16:49

## 2018-05-27 RX ADMIN — PANTOPRAZOLE SODIUM 80 MILLIGRAM(S): 20 TABLET, DELAYED RELEASE ORAL at 06:38

## 2018-05-27 RX ADMIN — Medication 8.33 MICROGRAM(S)/KG/MIN: at 21:42

## 2018-05-27 RX ADMIN — SODIUM CHLORIDE 1500 MILLILITER(S): 9 INJECTION, SOLUTION INTRAVENOUS at 02:57

## 2018-05-27 RX ADMIN — ONDANSETRON 8 MILLIGRAM(S): 8 TABLET, FILM COATED ORAL at 00:10

## 2018-05-27 RX ADMIN — PROPOFOL 30 MILLIGRAM(S): 10 INJECTION, EMULSION INTRAVENOUS at 11:55

## 2018-05-27 RX ADMIN — MIDAZOLAM HYDROCHLORIDE 2 MILLIGRAM(S): 1 INJECTION, SOLUTION INTRAMUSCULAR; INTRAVENOUS at 11:58

## 2018-05-27 RX ADMIN — Medication 8.33 MICROGRAM(S)/KG/MIN: at 16:49

## 2018-05-27 RX ADMIN — PANTOPRAZOLE SODIUM 10 MG/HR: 20 TABLET, DELAYED RELEASE ORAL at 09:10

## 2018-05-27 RX ADMIN — PIPERACILLIN AND TAZOBACTAM 25 GRAM(S): 4; .5 INJECTION, POWDER, LYOPHILIZED, FOR SOLUTION INTRAVENOUS at 22:26

## 2018-05-27 NOTE — CHART NOTE - NSCHARTNOTEFT_GEN_A_CORE
Brief GI Update    Patient intubated in MICU and EGD was performed at bedside.  Note to be uploaded in [place].    Findings:  - no active bleeding  - two columns of large esophageal varices, 1 w/ stigmata of recent bleed (red whale sign and a linear ulcer), s/p EBL x 4 with complete eradication of varices  - large amount of clot in the fundus, unable to be completely moved for evaluation of mucosa under clot  - no signs in visible gastric portion of gastric varices, ulcers  - normal duodenal bulb and D2    Recs:  - keep intubated x 24 hours and monitor for further drop in Hgb   - monitor for active bleeding, but expect more melena will pass given clot in fundus  - continue octreotide gtt and ppi gtt x 3 days  - CFX or equivalent abx x 5 days  - would not place NGT/OGT given banding just performed  - further recs per Consult note written today.

## 2018-05-27 NOTE — H&P ADULT - NSHPREVIEWOFSYSTEMS_GEN_ALL_CORE
Gen: no fever/chills  HENT: no throat pain/nasal congestion  CV: no chest pain/palpitations, + dizziness/lightheadedness  Pulm: no SOB/cough  Abd: +abd pain, +hematemesis, +BRBPR  : no hematuria/dysuria  Skin: no rash  MSK: back pain  Ext: no LE edema or pain  Neuro: no HA/numbness/weakness

## 2018-05-27 NOTE — CONSULT NOTE ADULT - SUBJECTIVE AND OBJECTIVE BOX
Chief Complaint:  Patient is a 78y old  Male who presents with a chief complaint of GI bleed (27 May 2018 02:29)    HPI:  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Amity, chronic back pain, admitted to [facility] for lower abd pain and chest pain, transferred to [facility] for GIB.  Prior to admission, pt had been taking naproxen bid x 2-3 weeks for low back pain. Pt saw his PMD around this time, had + fecal occult blood test and PMD planned for colonoscopy next week.      Hosp course [facility]: Pt presented to [facility] on  for lower abd pain and chest pain, was admitted for r/o ACS, UTI, and intractable lower abd pain. Around 6am on , pt c/o worsening chest pain, found to have new-onset AFib, given full-dose lovenox and aspirin. Pt then had coffee-ground emesis x1, BRBPR x2 and was hypotensive to 77/47. ASA, lovenox, and naproxen were d/c'd. Pt was given 4L bolus fluids, then received 2 PRBC, 1 platelets, and 3 FFP. Pt started on protonix gtt and octreotide gtt, made NPO with NGT draining approx 500cc coffee ground emesis. Pt then transferred to [facility] for further management. (27 May 2018 02:29)    Since admitted, the patient has received 4 units blood, 1 plt, 3 FFP.  Noted dark red blood in NGT not actively draining. Melena during my interivew.  On low dose pressor.  Hgb came up to 9 on recent check.  Patient stated last drink 5 weeks ago.  His daughter said that he had previously been listed at [facility] for transplant, but was told he "recovered due to regeneration of the liver" and was no longer listed.  He has not seen [facility] hepatology for 5 years.  He was sober for many years but started drinking again recently after having been away with friends on vacation.  Last EGD-colon 5 years ago, colon with polyps and no varices on EGD per family.  He takes lactulose, lasix, propranolol as outpatient per family as well.  Per daughter, does get abdominal distention.  She also stated that at [facility] they were concerned about calcifications in his GB and that he will need cholecystectomy to prevent cancer formation.     Allergies:  Allergy Status Unknown  No Known Allergies      Home Medications:  Calcium 500+D:  (27 May 2018 04:44)  furosemide: 1 tab(s) orally once a day (27 May 2018 04:44)  lactulose 10 g/15 mL oral syrup: orally 3 times a day (27 May 2018 04:44)  naproxen: 1 tab(s) orally 2 times a day (27 May 2018 04:44)  propranolol 10 mg oral tablet: 1 tab(s) orally 2 times a day (27 May 2018 04:44)  Vitamin D3:  (27 May 2018 04:44)    Hospital Medications:  lidocaine 2% Gel 1 Application(s) Topical once  norepinephrine Infusion 0.05 MICROgram(s)/kG/Min IV Continuous <Continuous>  octreotide  Infusion 10 MICROgram(s)/Hr IV Continuous <Continuous>  pantoprazole Infusion 8 mG/Hr IV Continuous <Continuous>  piperacillin/tazobactam IVPB. 3.375 Gram(s) IV Intermittent every 8 hours      PMHX/PSHX:  Liver cirrhosis  Guillain-Amity syndrome  HTN (hypertension)  H/O inguinal hernia repair      Family history:  Family history of ovarian cancer (Sibling)      Social History: last drink 5 years ago, no tob, no drugs     ROS:     General:  No wt loss, fevers, chills, night sweats, fatigue,   Eyes:  Good vision, no reported pain  ENT:  No sore throat, pain, runny nose, dysphagia  CV:  No pain, palpitations, hypo/hypertension  Resp:  No dyspnea, cough, tachypnea, wheezing  GI:  See HPI  :  No pain, bleeding, incontinence, nocturia  Muscle:  No pain, weakness  Neuro:  No weakness, tingling, memory problems  Psych:  No fatigue, insomnia, mood problems, depression  Endocrine:  No polyuria, polydipsia, cold/heat intolerance  Heme:  No petechiae, ecchymosis, easy bruisability  Skin:  No rash, edema      PHYSICAL EXAM:     GENERAL:  NAD  HEENT:  NC/AT,  NGT in place draining 100cc dark red blood  CHEST:  Full & symmetric excursion, no increased effort, breath sounds clear  HEART:  Regular rhythm  ABDOMEN:  Soft, mildly distended, nontender   EXTREMITIES:  no cyanosis,clubbing or edema  SKIN:  No rash  NEURO:  Alert, oriented    Vital Signs:  Vital Signs Last 24 Hrs  T(C): 36.5 (27 May 2018 08:00), Max: 38 (26 May 2018 20:30)  T(F): 97.7 (27 May 2018 08:00), Max: 100.4 (26 May 2018 20:30)  HR: 68 (27 May 2018 09:30) (61 - 134)  BP: 104/52 (27 May 2018 09:30) (72/57 - 132/54)  BP(mean): 65 (27 May 2018 09:30) (43 - 94)  RR: 22 (27 May 2018 09:30) (16 - 31)  SpO2: 100% (27 May 2018 09:30) (95% - 100%)  Daily Height in cm: 175.26 (27 May 2018 02:20)    Daily     LABS:                        9.3    13.39 )-----------( 146      ( 27 May 2018 08:00 )             27.2     05-27    137  |  104  |  41<H>  ----------------------------<  212<H>  4.6   |  22  |  1.46<H>    Ca    7.4<L>      27 May 2018 08:00  Phos  3.1     05-  Mg     1.7     0527    TPro  5.2<L>  /  Alb  2.3<L>  /  TBili  2.1<H>  /  DBili  1.1<H>  /  AST  22  /  ALT  14  /  AlkPhos  68  05-27    LIVER FUNCTIONS - ( 27 May 2018 02:30 )  Alb: 2.3 g/dL / Pro: 5.2 g/dL / ALK PHOS: 68 u/L / ALT: 14 u/L / AST: 22 u/L / GGT: x           PT/INR - ( 27 May 2018 08:35 )   PT: 18.1 SEC;   INR: 1.61          PTT - ( 27 May 2018 08:35 )  PTT:30.1 SEC  Urinalysis Basic - ( 25 May 2018 19:37 )    Color: Yellow / Appearance: Clear / S.015 / pH: x  Gluc: x / Ketone: Negative  / Bili: Negative / Urobili: 1   Blood: x / Protein: 30 mg/dL / Nitrite: Negative   Leuk Esterase: Moderate / RBC: 5-10 /HPF / WBC 26-50 /HPF   Sq Epi: x / Non Sq Epi: Moderate /HPF / Bacteria: Many /HPF          Imaging:    < from: CT Abdomen and Pelvis w/ Oral Cont and w/ IV Cont (18 @ 22:48) >  EXAM:  CT ABDOMEN AND PELVIS OC IC                            PROCEDURE DATE:  2018          INTERPRETATION:  Clinical information: Left lower quadrant pain radiating   into the right lower quadrant    Comparison: None    PROCEDURE:   CT of the Abdomen and Pelvis was performed with intravenous contrast.  90ml of Omnipaque 350 was injected intravenously. 10cc was discarded.    FINDINGS:    LOWER CHEST: Within normal limits    ABDOMEN:  LIVER: Cirrhosis  BILE DUCTS: Normal caliber. 0.5 cm stone either in the pancreatic or   distal common bile duct.  GALLBLADDER: Cholelithiasis and porcelain gallbladder.  PANCREAS: Within normal limits  SPLEEN: Within normal limits  ADRENALS: Within normal limits  KIDNEYS: 2.7 cm ill-defined low-density lesion lower pole right kidney.   Mild perinephric fat stranding. 1.4 cm partially exophytic hypodensity   upper pole left kidney with surrounding fat stranding. Both are   indeterminate. No hydronephrosis.    PELVIS:  REPRODUCTIVE ORGANS: No pelvic masses  BLADDER: Within normal limits    PERITONEUM:No ascites, no free air.  BOWEL: Within normal limits. Normal appendix.  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 Celiac and   MERVIN patent. Extensive aortic calcifications.  RETROPERITONEUM: No retroperitoneal or pelvic adenopathy. 4 cm cystic   structure right pelvic sidewall, question Prolene plug repair of the   inguinal hernia. In the absence of such known instrumentation the lesion   is nonspecific and may be further evaluated with contrast-enhanced MRI.  ABDOMINAL WALL: Small fat-containing umbilical hernia. 3.3 cm soft tissue   lesion right rectus abdominis, possibly a desmoid. Comparison to prior   studies and follow-up with contrast-enhanced MRI may be considered.  MUSCULOSKELETAL: Within normal limits    IMPRESSION:     Right lower pole renal lesion. The differential includes pyelonephritis   and neoplasm. Urinalysis correlation and one to two-week follow-up   contrast enhanced MRI to ensure resolution is recommended.    Cholelithiasis and porcelain gallbladder. Cirrhosis. Pancreatic versus   distal common bile duct stone, if warranted MRCP/MRI contrast may be   considered.    Multiple other incidental findings with follow-up recommendations as   above.                        RHIANNON VERDIN M.D., ATTENDING RADIOLOGIST  This document has been electronically signed. May 26 2018  8:43AM    < end of copied text >

## 2018-05-27 NOTE — H&P ADULT - NSHPPHYSICALEXAM_GEN_ALL_CORE
Gen: overweight, alert, oriented to person and place but not to time  HENT: airway intact, no stridor, moist mucous membranes  CV: regular rate and rhythm, no murmurs  Pulm: lungs clear to auscultation bilaterally  Abd: soft, non-distended, mild diffuse tenderness  Skin: warm and well perfused  Ext: distal pulses intact, no pedal edema  Neuro: alert, clear speech, moves all extremities, no focal deficit  Psych: normal affect

## 2018-05-27 NOTE — CONSULT NOTE ADULT - ATTENDING COMMENTS
Upper GI bleed s/p Upper endoscopy revealing two columns of large varices.   4 bands were successfully placed with deflation of varices.     Continue octreotide and antibiotics  Continue to monitor H/H  Will follow

## 2018-05-27 NOTE — CONSULT NOTE ADULT - ASSESSMENT
Impression:  79yo M with EtOH abuse, alcoholic cirrhosis, HTN, distant hx Guillain-[place], chronic back pain, admitted to [facility] for lower abd pain and chest pain, transferred to [facility] for GIB.    Problem List:  1) Upper GI Bleed - ddx includes variceal bleed vs PUD 2/2 NSAID use vs H. pylori vs mass vs dudodenal ischemia (2/2 SMA stenosis) less likely angioectasia given significant drop in Hgb  2) Acute blood loss anemia - 2/2 above  3) ETOH Cirrhosis, previously on transplant list but no longer - MELD-Na 21       - varices: possible, pending EGD       - ascites: none on CT       - SPE: none, on lactulose at home       - HCC: unknown        - coagulopathy - INR elevated, thrombocytopenia - had FFP and plt tx       - etiology - likely ETOH but will perform further serologic eval when stable  4) Porcelain GB   5) Possible PD or CBD stone without ductal dilatation  6) SMA stenosis     Plan:  - will plan for emergent EGD today; patient will need to be intubated for airway protection.  To be done at bedside in MICU.  Consent obtained from patient, discussed with daughter and wife as well.  - octreotide gtt, protonix gtt  - initiate empiric Ceftriaxone in case of variceal bleed.  Zosyn ok for empiric broad spec coverage   - conservative transfusions, do not want to over transfuse as can make variceal bleed worse  - when stable, can get MRI/MRCP for further evaluation of biliary tree, gallbladder, and liver (to eval for HCC)  - when stable, would also check the following for cirrhosis eval:  Hep B surface Ab, KERRI, ASMA, total IgG, AMA, ceruloplasmin, alpha 1 antitrypsin  - would also consider vascular evaluation for high grade SMA stenosis and risk of mesenteric ischemia

## 2018-05-27 NOTE — H&P ADULT - ASSESSMENT
# Neuro    # Skin    # GI    # Endocrine    # Metabolic/renal    # Hematology  - no anticoagulation as pt with active bleed  - trend CBC    # Infectious disease    # Cardiovascular  Pt in hemorrhagic shock.  - Continue transfusing PRBC, platelets, FFP    # Pulmonary # Neuro  - Fentanyl PRN for pain    # Skin  Right IJ central line in place    # GI  Contacted GI for urgent c/s for upper endoscopy. Bleeding likely 2/2 varices vs peptic ulcer.  - NPO  - Protonix 40mg bid  - Octreotide gtt    # Endocrine    # Metabolic/renal    # Hematology  - no anticoagulation as pt with active bleed  - trend CBC    # Infectious disease  UA positive.  - Zosyn q 8 hrs (will cover UTI and enteric organisms if variceal bleed)    # Cardiovascular  Pt in hemorrhagic shock.  - Continue blood transfusion. Pt needs additional pRBCs/platelets/FFP.    # Pulmonary  No acute issue.    # Code status  Pt is full code. # Neuro  - Fentanyl PRN for pain    # Skin  Right IJ central line in place    # GI  Contacted GI for urgent c/s for upper endoscopy. Bleeding likely 2/2 varices vs peptic ulcer.  - NPO  - Protonix 80mg IVP bid  - Octreotide gtt    # Endocrine    # Metabolic/renal    # Hematology  - no anticoagulation as pt with active bleed  - trend CBC    # Infectious disease  UA positive.  - Zosyn q 8 hrs (will cover UTI and enteric organisms if variceal bleed)    # Cardiovascular  Pt in hemorrhagic shock.  - Continue blood transfusion. Pt needs additional pRBCs/platelets/FFP.    # Pulmonary  No acute issue.    # Code status  Pt is full code. # Neuro  - Fentanyl PRN for pain    # Skin  Right IJ central line in place    # GI  Contacted GI for urgent c/s for upper endoscopy. Bleeding likely 2/2 varices vs peptic ulcer.  - NPO  - Protonix 80mg IVP bid  - Octreotide gtt    # Endocrine   No acute issue    # Metabolic/renal  - Trend BUN/Cr    # Hematology  - SCDs. No anticoagulation as pt with active bleeding.  - Trend CBC    # Infectious disease  UA positive.  - Zosyn q 8 hrs. Zosyn will cover UTI and enteric organisms if variceal bleed.    # Cardiovascular  Pt in hemorrhagic shock from active GIB.  - Continue blood transfusion. Pt needs additional pRBCs/platelets/FFP.    # Pulmonary  No acute issue.    # Code status  Pt is full code. # Neuro  - Fentanyl PRN for pain    # Skin  Right IJ central line in place    # GI  Contacted GI for urgent c/s for upper endoscopy. Bleeding likely 2/2 varices vs peptic ulcer.  - NPO  - Protonix 80mg IVP bid  - Octreotide gtt    # Endocrine   No acute issue    # Metabolic/renal  - Trend BUN/Cr    # Hematology  - SCDs. No anticoagulation as pt with active bleeding.  - Trend CBC    # Infectious disease  UA positive.  - Zosyn q 8 hrs. Zosyn will cover UTI and enteric organisms if variceal bleed.    # Cardiovascular  Pt in hemorrhagic shock from active GIB.  - Continue blood transfusion. Pt needs additional pRBCs/platelets/FFP.  - Hold home BP meds.    # Pulmonary  No acute issue.    # Code status  Pt is full code.

## 2018-05-27 NOTE — H&P ADULT - ATTENDING COMMENTS
pt seen and examined. hx cirrhosis with low back pain taking  naprosen. presented with  acs , afib given lovenox and developed  gi bleed . with dec. sbp. pt s/p 2 u prbcs, ffp and plts. pt transferred  for further gi workup with gi bleed and hypotension.  critically ill

## 2018-05-27 NOTE — H&P ADULT - HISTORY OF PRESENT ILLNESS
78M h/o EtOH abuse, cirrhosis, HTN, distant hx Guillain-[place], chronic back pain, admitted to [facility] for lower abd pain and chest pain, transferred to [facility] for GIB.  Prior to admission, pt had been taking naproxen bid x 2-3 weeks for low back pain. Pt saw his PMD around this time, had + fecal occult blood test and PMD planned for colonoscopy next week.      Hosp course [facility]: Pt presented to [facility] on 5/25 for lower abd pain and chest pain, was admitted for r/o ACS, UTI, and intractable lower abd pain. Around 6am on 5/26, pt c/o worsening chest pain, found to have new-onset AFib, given full-dose lovenox and aspirin. Pt then had coffee-ground emesis x1, BRBPR x2 and was hypotensive to 77/47. ASA, lovenox, and naproxen were d/c'd. Pt was given 4L bolus fluids, then received 2 PRBC, 1 platelets, and 3 FFP. Pt started on protonix gtt and octreotide gtt, made NPO with NGT draining approx 500cc coffee ground emesis. Pt then transferred to [facility] for further management. 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[place], chronic back pain, admitted to [facility] for lower abd pain and chest pain, transferred to [facility] for GIB.  Prior to admission, pt had been taking naproxen bid x 2-3 weeks for low back pain. Pt saw his PMD around this time, had + fecal occult blood test and PMD planned for colonoscopy next week.      Hosp course [facility]: Pt presented to [facility] on 5/25 for lower abd pain and chest pain, was admitted for r/o ACS, UTI, and intractable lower abd pain. Around 6am on 5/26, pt c/o worsening chest pain, found to have new-onset AFib, given full-dose lovenox and aspirin. Pt then had coffee-ground emesis x1, BRBPR x2 and was hypotensive to 77/47. ASA, lovenox, and naproxen were d/c'd. Pt was given 4L bolus fluids, then received 2 PRBC, 1 platelets, and 3 FFP. Pt started on protonix gtt and octreotide gtt, made NPO with NGT draining approx 500cc coffee ground emesis. Pt then transferred to [facility] for further management.

## 2018-05-27 NOTE — PROGRESS NOTE ADULT - ASSESSMENT
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[place], chronic back pain, admitted to [facility] for lower abd pain and chest pain, transferred to [facility] for GIB w/ coffee ground emesis and BRBPR, concerned for variceal bleed versus bleeding peptic ulcer    # Neuro - no hx of neurologic dz; no active issues  - Fentanyl PRN for pain    # Cardiovascular - Pt in hemorrhagic shock from active GIB.  - Continue blood transfusion; maintain active T&S, keep Hgb > 7; will transfuse plts/pRBC/FFP as needed  - on phenylephrine for pressor support  - Hold home BP meds.    # Pulmonary - no hx of pulmonary dz  - No acute issue; saturating well on RA  # Skin  - Right IJ central line in place    # GI  - f/u GI for urgent c/s for upper endoscopy; Bleeding likely 2/2 varices vs peptic ulcer.  - NPO  - Protonix 80mg IVP bid  - Octreotide gtt    # Endocrine - no hx of DM or thyroid issues  - No acute issue    # Metabolic/renal - Cr now at 1.35, likely KAY from prerenal azotemia in the setting of acute blood loss  - Trend BUN/Cr  - can consider adding on IVF given NPO status and acute blood loss anemia    # Hematology - no hx of hematologic malignancy, hgb downtrending in setting of acute blood loss 2/2 GIB  - Trend CBC; maintain active T&S, hgb > 7; transfuse pRBCs, plts, and FFP as needed  - no AC in setting of acute bleed; SCDs for now    # Infectious disease- UA positive.  - Zosyn q 8 hrs to cover UTI and enteric-related organisms    # DVT prophylaxis  - SCDs in setting of acute bleed     # Ethics  - full code

## 2018-05-27 NOTE — H&P ADULT - NSHPLABSRESULTS_GEN_ALL_CORE
Labs reviewed personally.               8.5    12.3  )-----------( 171      ( 26 May 2018 23:50 )             26.4     Hgb Trend: 8.5<--, 10.3<--, 9.1<--, 12.3<--, 14.0<--      137  |  109<H>  |  33<H>  ----------------------------<  159<H>  5.4<H>   |  18<L>  |  1.51<H>    Ca    7.7<L>      26 May 2018 21:36  Phos  2.0       Mg     1.6         TPro  5.4<L>  /  Alb  1.6<L>  /  TBili  0.7  /  DBili  x   /  AST  29  /  ALT  28  /  AlkPhos  106      Creatinine Trend: 1.51<--, see note<--, 1.66<--, 1.23<--, 1.30<--  PT/INR - ( 26 May 2018 23:50 )   PT: 19.0 sec;   INR: 1.72 ratio         PTT - ( 26 May 2018 23:50 )  PTT:28.9 sec  Urinalysis Basic - ( 25 May 2018 19:37 )    Color: Yellow / Appearance: Clear / S.015 / pH: x  Gluc: x / Ketone: Negative  / Bili: Negative / Urobili: 1   Blood: x / Protein: 30 mg/dL / Nitrite: Negative   Leuk Esterase: Moderate / RBC: 5-10 /HPF / WBC 26-50 /HPF   Sq Epi: x / Non Sq Epi: Moderate /HPF / Bacteria: Many /HPF    Imaging reviewed personally.

## 2018-05-27 NOTE — PROGRESS NOTE ADULT - SUBJECTIVE AND OBJECTIVE BOX
Interval Events: got 4 u pRBcs, 4 U FFP, 2 U platelets, GI emergently consulted for hemoglobin drop and continued output of coffee ground emesis    REVIEW OF SYSTEMS:  Constitutional: [ ] negative [ ] fevers [ ] chills [ ] weight loss [ ] weight gain  HEENT: [ ] negative [ ] dry eyes [ ] eye irritation [ ] postnasal drip [ ] nasal congestion  CV: [ ] negative  [ ] chest pain [ ] orthopnea [ ] palpitations [ ] murmur  Resp: [ ] negative [ ] cough [ ] shortness of breath [ ] dyspnea [ ] wheezing [ ] sputum [ ] hemoptysis  GI: [ ] negative [ ] nausea [ ] vomiting [ ] diarrhea [ ] constipation [ ] abd pain [ ] dysphagia   : [ ] negative [ ] dysuria [ ] nocturia [ ] hematuria [ ] increased urinary frequency  Musculoskeletal: [ ] negative [ ] back pain [ ] myalgias [ ] arthralgias [ ] fracture  Skin: [ ] negative [ ] rash [ ] itch  Neurological: [ ] negative [ ] headache [ ] dizziness [ ] syncope [ ] weakness [ ] numbness  Psychiatric: [ ] negative [ ] anxiety [ ] depression  Endocrine: [ ] negative [ ] diabetes [ ] thyroid problem  Hematologic/Lymphatic: [ ] negative [ ] anemia [ ] bleeding problem  Allergic/Immunologic: [ ] negative [ ] itchy eyes [ ] nasal discharge [ ] hives [ ] angioedema  [ ] All other systems negative  [ ] Unable to assess ROS because ________    OBJECTIVE:  ICU Vital Signs Last 24 Hrs  T(C): 36.6 (27 May 2018 04:00), Max: 38 (26 May 2018 20:30)  T(F): 97.8 (27 May 2018 04:00), Max: 100.4 (26 May 2018 20:30)  HR: 70 (27 May 2018 06:00) (61 - 134)  BP: 117/32 (27 May 2018 06:00) (72/57 - 132/54)  BP(mean): 53 (27 May 2018 06:00) (52 - 94)  RR: 17 (27 May 2018 06:00) (16 - 31)  SpO2: 100% (27 May 2018 06:00) (95% - 100%)    - @ 07:01  -   @ 07:00  --------------------------------------------------------  IN: 1347 mL / OUT: 445 mL / NET: 902 mL    CAPILLARY BLOOD GLUCOSE    POCT Blood Glucose.: 226 mg/dL (27 May 2018 06:36)    PHYSICAL EXAM:  Gen: overweight, alert, oriented to person and place but not to time  HENT: airway intact, no stridor, moist mucous membranes  CV: regular rate and rhythm, no murmurs  Pulm: lungs clear to auscultation bilaterally  Abd: soft, non-distended, mild diffuse tenderness  Skin: warm and well perfused  Ext: distal pulses intact, no pedal edema  Neuro: alert, clear speech, moves all extremities, no focal deficit  Psych: normal affect    LINES:    HOSPITAL MEDICATIONS:  Standing Meds:  lidocaine 2% Gel 1 Application(s) Topical once  octreotide  Infusion 10 MICROgram(s)/Hr IV Continuous <Continuous>  pantoprazole  Injectable 80 milliGRAM(s) IV Push two times a day  phenylephrine    Infusion 0.1 MICROgram(s)/kG/Min IV Continuous <Continuous>  piperacillin/tazobactam IVPB. 3.375 Gram(s) IV Intermittent every 8 hours      PRN Meds:      LABS:                        6.9    15.88 )-----------( 189      ( 27 May 2018 04:00 )             20.3     Hgb Trend: 6.9<--, 14.1<--, 8.5<--, 10.3<--, 9.1<--      135  |  102  |  39<H>  ----------------------------<  228<H>  4.3   |  21<L>  |  1.35<H>    Ca    7.5<L>      27 May 2018 02:30  Phos  2.6       Mg     1.7         TPro  5.2<L>  /  Alb  2.3<L>  /  TBili  2.1<H>  /  DBili  1.1<H>  /  AST  22  /  ALT  14  /  AlkPhos  68      Creatinine Trend: 1.35<--, 1.51<--, see note<--, 1.66<--, 1.23<--, 1.30<--  PT/INR - ( 27 May 2018 02:30 )   PT: 18.6 SEC;   INR: 1.66       PTT - ( 27 May 2018 02:30 )  PTT:25.1 SEC  Urinalysis Basic - ( 25 May 2018 19:37 )    Color: Yellow / Appearance: Clear / S.015 / pH: x  Gluc: x / Ketone: Negative  / Bili: Negative / Urobili: 1   Blood: x / Protein: 30 mg/dL / Nitrite: Negative   Leuk Esterase: Moderate / RBC: 5-10 /HPF / WBC 26-50 /HPF   Sq Epi: x / Non Sq Epi: Moderate /HPF / Bacteria: Many /HPF    Venous Blood Gas:   @ 23:44  7.37/33/<43/19/74  VBG Lactate: --

## 2018-05-28 LAB
-  AMIKACIN: SIGNIFICANT CHANGE UP
-  AMOXICILLIN/CLAVULANIC ACID: SIGNIFICANT CHANGE UP
-  AMPICILLIN/SULBACTAM: SIGNIFICANT CHANGE UP
-  AMPICILLIN: SIGNIFICANT CHANGE UP
-  AZTREONAM: SIGNIFICANT CHANGE UP
-  CEFAZOLIN: SIGNIFICANT CHANGE UP
-  CEFEPIME: SIGNIFICANT CHANGE UP
-  CEFOXITIN: SIGNIFICANT CHANGE UP
-  CEFTRIAXONE: SIGNIFICANT CHANGE UP
-  CIPROFLOXACIN: SIGNIFICANT CHANGE UP
-  ERTAPENEM: SIGNIFICANT CHANGE UP
-  GENTAMICIN: SIGNIFICANT CHANGE UP
-  IMIPENEM: SIGNIFICANT CHANGE UP
-  LEVOFLOXACIN: SIGNIFICANT CHANGE UP
-  MEROPENEM: SIGNIFICANT CHANGE UP
-  NITROFURANTOIN: SIGNIFICANT CHANGE UP
-  PIPERACILLIN/TAZOBACTAM: SIGNIFICANT CHANGE UP
-  TIGECYCLINE: SIGNIFICANT CHANGE UP
-  TOBRAMYCIN: SIGNIFICANT CHANGE UP
-  TRIMETHOPRIM/SULFAMETHOXAZOLE: SIGNIFICANT CHANGE UP
ALBUMIN SERPL ELPH-MCNC: 2.3 G/DL — LOW (ref 3.3–5)
ALP SERPL-CCNC: 57 U/L — SIGNIFICANT CHANGE UP (ref 40–120)
ALT FLD-CCNC: 18 U/L — SIGNIFICANT CHANGE UP (ref 4–41)
APTT BLD: 25.3 SEC — LOW (ref 27.5–37.4)
AST SERPL-CCNC: 22 U/L — SIGNIFICANT CHANGE UP (ref 4–40)
BASE EXCESS BLDV CALC-SCNC: -0.3 MMOL/L — SIGNIFICANT CHANGE UP
BILIRUB SERPL-MCNC: 1 MG/DL — SIGNIFICANT CHANGE UP (ref 0.2–1.2)
BLOOD GAS VENOUS - CREATININE: 1.16 MG/DL — SIGNIFICANT CHANGE UP (ref 0.5–1.3)
BUN SERPL-MCNC: 40 MG/DL — HIGH (ref 7–23)
CALCIUM SERPL-MCNC: 7.5 MG/DL — LOW (ref 8.4–10.5)
CHLORIDE BLDV-SCNC: 112 MMOL/L — HIGH (ref 96–108)
CHLORIDE SERPL-SCNC: 105 MMOL/L — SIGNIFICANT CHANGE UP (ref 98–107)
CO2 SERPL-SCNC: 21 MMOL/L — LOW (ref 22–31)
CREAT SERPL-MCNC: 1.54 MG/DL — HIGH (ref 0.5–1.3)
CULTURE RESULTS: SIGNIFICANT CHANGE UP
GAS PNL BLDV: 134 MMOL/L — LOW (ref 136–146)
GLUCOSE BLDV-MCNC: 132 — HIGH (ref 70–99)
GLUCOSE SERPL-MCNC: 188 MG/DL — HIGH (ref 70–99)
HCO3 BLDV-SCNC: 24 MMOL/L — SIGNIFICANT CHANGE UP (ref 20–27)
HCT VFR BLD CALC: 20.2 % — CRITICAL LOW (ref 39–50)
HCT VFR BLD CALC: 21.5 % — LOW (ref 39–50)
HCT VFR BLD CALC: 21.6 % — LOW (ref 39–50)
HCT VFR BLD CALC: 24.7 % — LOW (ref 39–50)
HCT VFR BLDV CALC: 30.9 % — LOW (ref 39–51)
HGB BLD-MCNC: 6.9 G/DL — CRITICAL LOW (ref 13–17)
HGB BLD-MCNC: 7.2 G/DL — LOW (ref 13–17)
HGB BLD-MCNC: 7.6 G/DL — LOW (ref 13–17)
HGB BLD-MCNC: 8.6 G/DL — LOW (ref 13–17)
HGB BLDV-MCNC: 10 G/DL — LOW (ref 13–17)
INR BLD: 1.42 — HIGH (ref 0.88–1.17)
LACTATE BLDV-MCNC: 2.4 MMOL/L — HIGH (ref 0.5–2)
MAGNESIUM SERPL-MCNC: 1.7 MG/DL — SIGNIFICANT CHANGE UP (ref 1.6–2.6)
MCHC RBC-ENTMCNC: 29 PG — SIGNIFICANT CHANGE UP (ref 27–34)
MCHC RBC-ENTMCNC: 29 PG — SIGNIFICANT CHANGE UP (ref 27–34)
MCHC RBC-ENTMCNC: 29.2 PG — SIGNIFICANT CHANGE UP (ref 27–34)
MCHC RBC-ENTMCNC: 30.2 PG — SIGNIFICANT CHANGE UP (ref 27–34)
MCHC RBC-ENTMCNC: 33.5 % — SIGNIFICANT CHANGE UP (ref 32–36)
MCHC RBC-ENTMCNC: 34.2 % — SIGNIFICANT CHANGE UP (ref 32–36)
MCHC RBC-ENTMCNC: 34.8 % — SIGNIFICANT CHANGE UP (ref 32–36)
MCHC RBC-ENTMCNC: 35.2 % — SIGNIFICANT CHANGE UP (ref 32–36)
MCV RBC AUTO: 83.1 FL — SIGNIFICANT CHANGE UP (ref 80–100)
MCV RBC AUTO: 84.9 FL — SIGNIFICANT CHANGE UP (ref 80–100)
MCV RBC AUTO: 86.7 FL — SIGNIFICANT CHANGE UP (ref 80–100)
MCV RBC AUTO: 86.7 FL — SIGNIFICANT CHANGE UP (ref 80–100)
METHOD TYPE: SIGNIFICANT CHANGE UP
NRBC # FLD: 0 — SIGNIFICANT CHANGE UP
ORGANISM # SPEC MICROSCOPIC CNT: SIGNIFICANT CHANGE UP
ORGANISM # SPEC MICROSCOPIC CNT: SIGNIFICANT CHANGE UP
PCO2 BLDV: 41 MMHG — SIGNIFICANT CHANGE UP (ref 41–51)
PH BLDV: 7.39 PH — SIGNIFICANT CHANGE UP (ref 7.32–7.43)
PHOSPHATE SERPL-MCNC: 1.7 MG/DL — LOW (ref 2.5–4.5)
PLATELET # BLD AUTO: 129 K/UL — LOW (ref 150–400)
PLATELET # BLD AUTO: 132 K/UL — LOW (ref 150–400)
PLATELET # BLD AUTO: 145 K/UL — LOW (ref 150–400)
PLATELET # BLD AUTO: 159 K/UL — SIGNIFICANT CHANGE UP (ref 150–400)
PMV BLD: 10.5 FL — SIGNIFICANT CHANGE UP (ref 7–13)
PMV BLD: 10.7 FL — SIGNIFICANT CHANGE UP (ref 7–13)
PMV BLD: 10.7 FL — SIGNIFICANT CHANGE UP (ref 7–13)
PMV BLD: 10.9 FL — SIGNIFICANT CHANGE UP (ref 7–13)
PO2 BLDV: 32 MMHG — LOW (ref 35–40)
POTASSIUM BLDV-SCNC: 3.9 MMOL/L — SIGNIFICANT CHANGE UP (ref 3.4–4.5)
POTASSIUM SERPL-MCNC: 3.4 MMOL/L — LOW (ref 3.5–5.3)
POTASSIUM SERPL-SCNC: 3.4 MMOL/L — LOW (ref 3.5–5.3)
PREALB SERPL-MCNC: 7 MG/DL — LOW (ref 20–40)
PROT SERPL-MCNC: 4.9 G/DL — LOW (ref 6–8.3)
PROTHROM AB SERPL-ACNC: 16.5 SEC — HIGH (ref 9.8–13.1)
RBC # BLD: 2.38 M/UL — LOW (ref 4.2–5.8)
RBC # BLD: 2.48 M/UL — LOW (ref 4.2–5.8)
RBC # BLD: 2.6 M/UL — LOW (ref 4.2–5.8)
RBC # BLD: 2.85 M/UL — LOW (ref 4.2–5.8)
RBC # FLD: 16 % — HIGH (ref 10.3–14.5)
RBC # FLD: 16.3 % — HIGH (ref 10.3–14.5)
RBC # FLD: 16.4 % — HIGH (ref 10.3–14.5)
RBC # FLD: 16.4 % — HIGH (ref 10.3–14.5)
SAO2 % BLDV: 62.1 % — SIGNIFICANT CHANGE UP (ref 60–85)
SODIUM SERPL-SCNC: 139 MMOL/L — SIGNIFICANT CHANGE UP (ref 135–145)
SPECIMEN SOURCE: SIGNIFICANT CHANGE UP
WBC # BLD: 10.63 K/UL — HIGH (ref 3.8–10.5)
WBC # BLD: 11.76 K/UL — HIGH (ref 3.8–10.5)
WBC # BLD: 12.4 K/UL — HIGH (ref 3.8–10.5)
WBC # BLD: 9.26 K/UL — SIGNIFICANT CHANGE UP (ref 3.8–10.5)
WBC # FLD AUTO: 10.63 K/UL — HIGH (ref 3.8–10.5)
WBC # FLD AUTO: 11.76 K/UL — HIGH (ref 3.8–10.5)
WBC # FLD AUTO: 12.4 K/UL — HIGH (ref 3.8–10.5)
WBC # FLD AUTO: 9.26 K/UL — SIGNIFICANT CHANGE UP (ref 3.8–10.5)

## 2018-05-28 PROCEDURE — 99291 CRITICAL CARE FIRST HOUR: CPT

## 2018-05-28 RX ORDER — POTASSIUM CHLORIDE 20 MEQ
10 PACKET (EA) ORAL
Qty: 0 | Refills: 0 | Status: COMPLETED | OUTPATIENT
Start: 2018-05-28 | End: 2018-05-28

## 2018-05-28 RX ORDER — LANOLIN ALCOHOL/MO/W.PET/CERES
3 CREAM (GRAM) TOPICAL AT BEDTIME
Qty: 0 | Refills: 0 | Status: DISCONTINUED | OUTPATIENT
Start: 2018-05-28 | End: 2018-06-07

## 2018-05-28 RX ORDER — LANOLIN ALCOHOL/MO/W.PET/CERES
1 CREAM (GRAM) TOPICAL AT BEDTIME
Qty: 0 | Refills: 0 | Status: DISCONTINUED | OUTPATIENT
Start: 2018-05-28 | End: 2018-05-28

## 2018-05-28 RX ORDER — CHLORHEXIDINE GLUCONATE 213 G/1000ML
15 SOLUTION TOPICAL
Qty: 0 | Refills: 0 | Status: DISCONTINUED | OUTPATIENT
Start: 2018-05-28 | End: 2018-05-29

## 2018-05-28 RX ORDER — CHLORHEXIDINE GLUCONATE 213 G/1000ML
1 SOLUTION TOPICAL
Qty: 0 | Refills: 0 | Status: DISCONTINUED | OUTPATIENT
Start: 2018-05-28 | End: 2018-05-29

## 2018-05-28 RX ADMIN — OCTREOTIDE ACETATE 10 MICROGRAM(S)/HR: 200 INJECTION, SOLUTION INTRAVENOUS; SUBCUTANEOUS at 02:36

## 2018-05-28 RX ADMIN — OCTREOTIDE ACETATE 10 MICROGRAM(S)/HR: 200 INJECTION, SOLUTION INTRAVENOUS; SUBCUTANEOUS at 08:01

## 2018-05-28 RX ADMIN — PANTOPRAZOLE SODIUM 10 MG/HR: 20 TABLET, DELAYED RELEASE ORAL at 04:00

## 2018-05-28 RX ADMIN — Medication 100 MILLIEQUIVALENT(S): at 04:28

## 2018-05-28 RX ADMIN — PIPERACILLIN AND TAZOBACTAM 25 GRAM(S): 4; .5 INJECTION, POWDER, LYOPHILIZED, FOR SOLUTION INTRAVENOUS at 05:55

## 2018-05-28 RX ADMIN — CHLORHEXIDINE GLUCONATE 15 MILLILITER(S): 213 SOLUTION TOPICAL at 05:55

## 2018-05-28 RX ADMIN — CHLORHEXIDINE GLUCONATE 15 MILLILITER(S): 213 SOLUTION TOPICAL at 16:08

## 2018-05-28 RX ADMIN — Medication 8.33 MICROGRAM(S)/KG/MIN: at 08:00

## 2018-05-28 RX ADMIN — Medication 3 MILLIGRAM(S): at 22:42

## 2018-05-28 RX ADMIN — CHLORHEXIDINE GLUCONATE 1 APPLICATION(S): 213 SOLUTION TOPICAL at 08:07

## 2018-05-28 RX ADMIN — PROPOFOL 21.34 MICROGRAM(S)/KG/MIN: 10 INJECTION, EMULSION INTRAVENOUS at 02:39

## 2018-05-28 RX ADMIN — Medication 100 MILLIEQUIVALENT(S): at 05:33

## 2018-05-28 RX ADMIN — PANTOPRAZOLE SODIUM 10 MG/HR: 20 TABLET, DELAYED RELEASE ORAL at 07:59

## 2018-05-28 RX ADMIN — Medication 63.75 MILLIMOLE(S): at 07:58

## 2018-05-28 RX ADMIN — Medication 100 MILLIEQUIVALENT(S): at 06:41

## 2018-05-28 NOTE — CONSULT NOTE ADULT - ASSESSMENT
78 M with alcoholic cirrhosis presents with GI bleed. Also noted to have a SMA stenosis. Celiac artery and MERVIN are patent. It is unclear if the patient's lower abdominal pain is related at this time.  -Recommend mesenteric duplex to assess celiac artery, SMA, and MERVIN  -D/w fellow  MADELYN Rome  [id number]

## 2018-05-28 NOTE — PROGRESS NOTE ADULT - SUBJECTIVE AND OBJECTIVE BOX
HEPATOLOGY PROGRESS NOTE      Chief Complaint:  Patient is a 78y old  Male who presents with a chief complaint of GI bleed (27 May 2018 02:29)      Interval Events: Pt extubated early this morning.  Had 3 melanic stools overnight.  Hgb downtrending slightly.  Pt complains of throat and epigastric pain.     Allergies:  Allergy Status Unknown  No Known Allergies      Hospital Medications:  chlorhexidine 0.12% Liquid 15 milliLiter(s) Swish and Spit two times a day  chlorhexidine 4% Liquid 1 Application(s) Topical <User Schedule>  norepinephrine Infusion 0.05 MICROgram(s)/kG/Min IV Continuous <Continuous>  octreotide  Infusion 50 MICROgram(s)/Hr IV Continuous <Continuous>  pantoprazole Infusion 8 mG/Hr IV Continuous <Continuous>  petrolatum Ophthalmic Ointment 1 Application(s) Both EYES daily  piperacillin/tazobactam IVPB. 3.375 Gram(s) IV Intermittent every 8 hours  sodium phosphate IVPB 15 milliMole(s) IV Intermittent once      PMHX/PSHX:  Liver cirrhosis  Guillain-[place] syndrome  HTN (hypertension)  H/O inguinal hernia repair      Family history:  Family history of ovarian cancer (Sibling)      ROS:     General:  No wt loss, fevers, chills, night sweats, fatigue,   Eyes:  Good vision, no reported pain  ENT:  No sore throat, pain, runny nose, dysphagia  CV:  No pain, palpitations, hypo/hypertension  Resp:  No dyspnea, cough, tachypnea, wheezing  GI:  See HPI  :  No pain, bleeding, incontinence, nocturia  Muscle:  No pain, weakness  Neuro:  No weakness, tingling, memory problems  Psych:  No fatigue, insomnia, mood problems, depression  Endocrine:  No polyuria, polydipsia, cold/heat intolerance  Heme:  No petechiae, ecchymosis, easy bruisability  Skin:  No rash, edema      PHYSICAL EXAM:     GENERAL: NAD  HEENT:  NC/AT,  conjunctivae clear, sclera -anicteric  CHEST:  Full & symmetric excursion, no increased effort, breath sounds clear  HEART:  Regular rhythm, S1, S2, no murmur/rub/S3/S4,  no edema  ABDOMEN:  Soft, TTP over epigastrium, +BS  EXTREMITIES:  no cyanosis,clubbing or edema  SKIN:  No rash  NEURO:  Alert, oriented    Vital Signs:  Vital Signs Last 24 Hrs  T(C): 35.8 (28 May 2018 04:00), Max: 36.8 (27 May 2018 12:00)  T(F): 96.5 (28 May 2018 04:00), Max: 98.2 (27 May 2018 12:00)  HR: 66 (28 May 2018 09:30) (52 - 96)  BP: 91/39 (28 May 2018 09:30) (79/52 - 173/65)  BP(mean): 52 (28 May 2018 09:30) (52 - 121)  RR: 25 (28 May 2018 09:30) (15 - 26)  SpO2: 100% (28 May 2018 09:30) (100% - 100%)  Daily     Daily Weight in k.1 (28 May 2018 04:00)    LABS:                        7.2    11.76 )-----------( 145      ( 28 May 2018 08:55 )             21.5     05-28    139  |  105  |  40<H>  ----------------------------<  188<H>  3.4<L>   |  21<L>  |  1.54<H>    Ca    7.5<L>      28 May 2018 03:20  Phos  1.7     05-28  Mg     1.7     -28    TPro  4.9<L>  /  Alb  2.3<L>  /  TBili  1.0  /  DBili  x   /  AST  22  /  ALT  18  /  AlkPhos  57  05-28    LIVER FUNCTIONS - ( 28 May 2018 03:20 )  Alb: 2.3 g/dL / Pro: 4.9 g/dL / ALK PHOS: 57 u/L / ALT: 18 u/L / AST: 22 u/L / GGT: x           PT/INR - ( 28 May 2018 03:20 )   PT: 16.5 SEC;   INR: 1.42          PTT - ( 28 May 2018 03:20 )  PTT:25.3 SEC        Imaging:  reviewed

## 2018-05-28 NOTE — PROGRESS NOTE ADULT - ASSESSMENT
Impression:  79yo M with EtOH abuse, alcoholic cirrhosis, HTN, distant hx Guillain-[place], chronic back pain, admitted to [facility] for lower abd pain and chest pain, transferred to [facility] for GIB.    Problem List:  1) Acute variceal bleed now s/p EBL of esophageal varices   2) Acute blood loss anemia - 2/2 above  3) ETOH Cirrhosis, previously on transplant list but no longer - MELD-Na 15       - varices: present s/p EBL 5/27       - ascites: none on CT       - SPE: none, on lactulose at home       - HCC: unknown        - coagulopathy - INR elevated, thrombocytopenia       - etiology - likely ETOH but will perform further serologic eval when stable  4) Porcelain GB   5) Possible PD or CBD stone without ductal dilatation  6) SMA stenosis     Plan:  - continue to trend CBC q 8 hours, transfuse if Hgb <7  - monitor for further overt GI bleeding - if present, pt may need second look endoscopy   - NPO  - octreotide gtt, protonix gtt x 3 days   - Zosyn x 5 days  - when stable, can get MRI/MRCP for further evaluation of biliary tree, gallbladder, and liver (to eval for HCC)  - when stable, would also check the following for cirrhosis eval:  Hep B surface Ab, KERRI, ASMA, total IgG, AMA, ceruloplasmin, alpha 1 antitrypsin  - would also consider vascular evaluation at some point for high grade SMA stenosis and risk of mesenteric ischemia

## 2018-05-28 NOTE — PROGRESS NOTE ADULT - ASSESSMENT
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[place], chronic back pain, admitted to [facility] for lower abd pain and chest pain, transferred to [facility] for GIB w/ coffee ground emesis and BRBPR, concerned for variceal bleed versus bleeding peptic ulcer    # Neuro - no hx of neurologic dz; no active issues  - Fentanyl PRN for pain    # Cardiovascular - Pt in hemorrhagic shock from active GIB.  - Continue blood transfusion; maintain active T&S, keep Hgb > 7; will transfuse plts/pRBC/FFP as needed  - on Levo for pressor support  - Hold home BP meds.    # Pulmonary - no hx of pulmonary dz; s/p extubation  - No acute issue; saturating well on RA    # Skin  - Right IJ central line in place    # GI - alcoholic cirrhosis with 2 columns of bleeding varices s/p 4 banding; heavy gastric clot burden will have melena as a result; no active bleeding on EGD, no signs of gastric varices or ulcers; normal duodenal bulb; pt now s/p extubation this AM  - c/w octreotide gtt and PPI gtt for 2 more days  - no NGT/OGT given recent banding  - c/w Zosyn for 5 days - day 2/5  - f/u GI recs  - hold AC given recent bleed    # Endocrine - no hx of DM or thyroid issues  - No acute issue    # Metabolic/renal - Cr now at 1.54 trending up, likely KAY from prerenal azotemia in the setting of acute blood loss  - Trend BUN/Cr  - can consider adding on IVF given NPO status and acute blood loss anemia w/ KAY  - Zosyn q8 to cover UTI and enteric-related organisms    # Hematology - no hx of hematologic malignancy, hgb downtrending in setting of acute blood loss 2/2 GIB  - Trend CBC; maintain active T&S, hgb > 7; transfuse pRBCs, plts, and FFP as needed  - no AC in setting of acute bleed; SCDs for now    # Infectious disease- UA positive.  - Zosyn q 8 hrs to cover UTI and enteric-related organisms    # DVT prophylaxis  - SCDs in setting of acute bleed     # Ethics  - full code 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[place], chronic back pain, admitted to [facility] for lower abd pain and chest pain, transferred to [facility] for GIB w/ coffee ground emesis and BRBPR, concerned for variceal bleed versus bleeding peptic ulcer    # Neuro - no hx of neurologic dz; no active issues  - Fentanyl PRN for pain    # Cardiovascular - Pt in hemorrhagic shock from active GIB c/b vasoplegic shock in the setting of UTI  - Continue blood transfusion; maintain active T&S, keep Hgb > 7; will transfuse plts/pRBC/FFP as needed  - on Levo for pressor support  - Hold home BP meds  - c/w Ceftriaxone as UTI is sensitive    # Pulmonary - no hx of pulmonary dz; s/p extubation  - No acute issue; saturating well on RA    # Skin  - Right IJ central line in place    # GI - alcoholic cirrhosis with 2 columns of bleeding varices s/p 4 banding; heavy gastric clot burden will have melena as a result; no active bleeding on EGD, no signs of gastric varices or ulcers; normal duodenal bulb; pt now s/p extubation this AM  - c/w octreotide gtt and PPI gtt for 2 more days  - no NGT/OGT given recent banding  - transition to Ceftriaxone - day 2/5 of antibiotics   - f/u GI recs  - hold AC given recent bleed    # Endocrine - no hx of DM or thyroid issues  - No acute issue    # Metabolic/renal - Cr now at 1.54 trending up, likely KAY from prerenal azotemia in the setting of acute blood loss  - Trend BUN/Cr  - can consider adding on IVF given NPO status and acute blood loss anemia w/ KAY  - Zosyn q8 to cover UTI and enteric-related organisms    # Hematology - no hx of hematologic malignancy, hgb downtrending in setting of acute blood loss 2/2 GIB  - Trend CBC; maintain active T&S, hgb > 7; transfuse pRBCs, plts, and FFP as needed  - no AC in setting of acute bleed; SCDs for now    # Infectious disease- UA positive.  - Ceftriaxone to cover UTI and enteric-related organisms    # DVT prophylaxis  - SCDs in setting of acute bleed     # Ethics  - full code

## 2018-05-28 NOTE — PROGRESS NOTE ADULT - SUBJECTIVE AND OBJECTIVE BOX
CHIEF COMPLAINT:    Interval Events:    REVIEW OF SYSTEMS:  Constitutional: [ ] negative [ ] fevers [ ] chills [ ] weight loss [ ] weight gain  HEENT: [ ] negative [ ] dry eyes [ ] eye irritation [ ] postnasal drip [ ] nasal congestion  CV: [ ] negative  [ ] chest pain [ ] orthopnea [ ] palpitations [ ] murmur  Resp: [ ] negative [ ] cough [ ] shortness of breath [ ] dyspnea [ ] wheezing [ ] sputum [ ] hemoptysis  GI: [ ] negative [ ] nausea [ ] vomiting [ ] diarrhea [ ] constipation [ ] abd pain [ ] dysphagia   : [ ] negative [ ] dysuria [ ] nocturia [ ] hematuria [ ] increased urinary frequency  Musculoskeletal: [ ] negative [ ] back pain [ ] myalgias [ ] arthralgias [ ] fracture  Skin: [ ] negative [ ] rash [ ] itch  Neurological: [ ] negative [ ] headache [ ] dizziness [ ] syncope [ ] weakness [ ] numbness  Psychiatric: [ ] negative [ ] anxiety [ ] depression  Endocrine: [ ] negative [ ] diabetes [ ] thyroid problem  Hematologic/Lymphatic: [ ] negative [ ] anemia [ ] bleeding problem  Allergic/Immunologic: [ ] negative [ ] itchy eyes [ ] nasal discharge [ ] hives [ ] angioedema  [ ] All other systems negative  [ ] Unable to assess ROS because ________    OBJECTIVE:  ICU Vital Signs Last 24 Hrs  T(C): 35.8 (28 May 2018 04:00), Max: 36.8 (27 May 2018 12:00)  T(F): 96.5 (28 May 2018 04:00), Max: 98.2 (27 May 2018 12:00)  HR: 77 (28 May 2018 06:00) (52 - 96)  BP: 112/47 (28 May 2018 06:00) (79/52 - 173/65)  BP(mean): 59 (28 May 2018 06:00) (43 - 121)  ABP: --  ABP(mean): --  RR: 20 (28 May 2018 06:00) (15 - 25)  SpO2: 100% (28 May 2018 06:00) (100% - 100%)    Mode: CPAP with PS, FiO2: 40, PEEP: 5, PS: 5, MAP: 7    05-26 @ 07:01  -  05-27 @ 07:00  --------------------------------------------------------  IN: 1347 mL / OUT: 445 mL / NET: 902 mL    05-27 @ 07:01  -  05-28 @ 06:59  --------------------------------------------------------  IN: 2230.6 mL / OUT: 1305 mL / NET: 925.6 mL      CAPILLARY BLOOD GLUCOSE      POCT Blood Glucose.: 226 mg/dL (27 May 2018 06:36)      PHYSICAL EXAM:  Gen: overweight, alert, oriented to person and place but not to time  HENT: airway intact, no stridor, moist mucous membranes  CV: regular rate and rhythm, no murmurs  Pulm: lungs clear to auscultation bilaterally  Abd: soft, non-distended, mild diffuse tenderness  Skin: warm and well perfused  Ext: distal pulses intact, no pedal edema  Neuro: alert, clear speech, moves all extremities, no focal deficit  Psych: normal affect    LINES:    HOSPITAL MEDICATIONS:  Standing Meds:  chlorhexidine 0.12% Liquid 15 milliLiter(s) Swish and Spit two times a day  chlorhexidine 4% Liquid 1 Application(s) Topical <User Schedule>  norepinephrine Infusion 0.05 MICROgram(s)/kG/Min IV Continuous <Continuous>  octreotide  Infusion 50 MICROgram(s)/Hr IV Continuous <Continuous>  pantoprazole Infusion 8 mG/Hr IV Continuous <Continuous>  petrolatum Ophthalmic Ointment 1 Application(s) Both EYES daily  piperacillin/tazobactam IVPB. 3.375 Gram(s) IV Intermittent every 8 hours  propofol Infusion 40 MICROgram(s)/kG/Min IV Continuous <Continuous>  sodium phosphate IVPB 15 milliMole(s) IV Intermittent once  sodium phosphate IVPB 15 milliMole(s) IV Intermittent once      PRN Meds:      LABS:                        7.6    12.40 )-----------( 159      ( 28 May 2018 03:20 )             21.6     Hgb Trend: 7.6<--, 8.1<--, 8.1<--, 8.1<--, 9.3<--  05-28    139  |  105  |  40<H>  ----------------------------<  188<H>  3.4<L>   |  21<L>  |  1.54<H>    Ca    7.5<L>      28 May 2018 03:20  Phos  1.7     05-28  Mg     1.7     05-28    TPro  4.9<L>  /  Alb  2.3<L>  /  TBili  1.0  /  DBili  x   /  AST  22  /  ALT  18  /  AlkPhos  57  05-28    Creatinine Trend: 1.54<--, 1.12<--, 1.46<--, 1.35<--, 1.51<--, see note<--  PT/INR - ( 28 May 2018 03:20 )   PT: 16.5 SEC;   INR: 1.42        PTT - ( 28 May 2018 03:20 )  PTT:25.3 SEC    Venous Blood Gas:  05-26 @ 23:44  7.37/33/<43/19/74  VBG Lactate: --    MICROBIOLOGY:     Culture - Blood (collected 26 May 2018 23:17)  Source: .Blood Blood  Preliminary Report (28 May 2018 01:02):    No growth to date.    Culture - Blood (collected 26 May 2018 23:17)  Source: .Blood Blood  Preliminary Report (28 May 2018 01:02):    No growth to date.    Culture - Urine (collected 26 May 2018 09:39)  Source: .Urine Clean Catch (Midstream)  Preliminary Report (27 May 2018 11:35):    >100,000 CFU/ml Escherichia coli Interval Events: extubated, doing well on RA    REVIEW OF SYSTEMS:  Constitutional: [ ] negative [ ] fevers [ ] chills [ ] weight loss [ ] weight gain  HEENT: [ ] negative [ ] dry eyes [ ] eye irritation [ ] postnasal drip [ ] nasal congestion  CV: [ ] negative  [ ] chest pain [ ] orthopnea [ ] palpitations [ ] murmur  Resp: [ ] negative [ ] cough [ ] shortness of breath [ ] dyspnea [ ] wheezing [ ] sputum [ ] hemoptysis  GI: [ ] negative [ ] nausea [ ] vomiting [ ] diarrhea [ ] constipation [x ] abd pain [ ] dysphagia   : [ ] negative [ ] dysuria [ ] nocturia [ ] hematuria [ ] increased urinary frequency  Musculoskeletal: [ ] negative [ ] back pain [ ] myalgias [ ] arthralgias [ ] fracture  Skin: [ ] negative [ ] rash [ ] itch  Neurological: [ ] negative [ ] headache [ ] dizziness [ ] syncope [ ] weakness [ ] numbness  Psychiatric: [ ] negative [ ] anxiety [ ] depression  Endocrine: [ ] negative [ ] diabetes [ ] thyroid problem  Hematologic/Lymphatic: [ ] negative [ ] anemia [ ] bleeding problem  Allergic/Immunologic: [ ] negative [ ] itchy eyes [ ] nasal discharge [ ] hives [ ] angioedema  [x ] All other systems negative  [ ] Unable to assess ROS because ________    OBJECTIVE:  ICU Vital Signs Last 24 Hrs  T(C): 35.8 (28 May 2018 04:00), Max: 36.8 (27 May 2018 12:00)  T(F): 96.5 (28 May 2018 04:00), Max: 98.2 (27 May 2018 12:00)  HR: 77 (28 May 2018 06:00) (52 - 96)  BP: 112/47 (28 May 2018 06:00) (79/52 - 173/65)  BP(mean): 59 (28 May 2018 06:00) (43 - 121)  RR: 20 (28 May 2018 06:00) (15 - 25)  SpO2: 100% (28 May 2018 06:00) (100% - 100%)    Mode: CPAP with PS, FiO2: 40, PEEP: 5, PS: 5, MAP: 7    05-26 @ 07:01  -  05-27 @ 07:00  --------------------------------------------------------  IN: 1347 mL / OUT: 445 mL / NET: 902 mL    05-27 @ 07:01  - 05-28 @ 06:59  --------------------------------------------------------  IN: 2230.6 mL / OUT: 1305 mL / NET: 925.6 mL    CAPILLARY BLOOD GLUCOSE    POCT Blood Glucose.: 226 mg/dL (27 May 2018 06:36)    PHYSICAL EXAM:  Gen: overweight, alert, oriented to person and place but not to time  HENT: airway intact, no stridor, moist mucous membranes  CV: regular rate and rhythm, no murmurs  Pulm: lungs clear to auscultation bilaterally  Abd: soft, non-distended, mild diffuse tenderness  Skin: warm and well perfused  Ext: distal pulses intact, no pedal edema  Neuro: alert, clear speech, moves all extremities, no focal deficit  Psych: normal affect    LINES:    HOSPITAL MEDICATIONS:  Standing Meds:  chlorhexidine 0.12% Liquid 15 milliLiter(s) Swish and Spit two times a day  chlorhexidine 4% Liquid 1 Application(s) Topical <User Schedule>  norepinephrine Infusion 0.05 MICROgram(s)/kG/Min IV Continuous <Continuous>  octreotide  Infusion 50 MICROgram(s)/Hr IV Continuous <Continuous>  pantoprazole Infusion 8 mG/Hr IV Continuous <Continuous>  petrolatum Ophthalmic Ointment 1 Application(s) Both EYES daily  piperacillin/tazobactam IVPB. 3.375 Gram(s) IV Intermittent every 8 hours  propofol Infusion 40 MICROgram(s)/kG/Min IV Continuous <Continuous>  sodium phosphate IVPB 15 milliMole(s) IV Intermittent once  sodium phosphate IVPB 15 milliMole(s) IV Intermittent once      PRN Meds:      LABS:                        7.6    12.40 )-----------( 159      ( 28 May 2018 03:20 )             21.6     Hgb Trend: 7.6<--, 8.1<--, 8.1<--, 8.1<--, 9.3<--  05-28    139  |  105  |  40<H>  ----------------------------<  188<H>  3.4<L>   |  21<L>  |  1.54<H>    Ca    7.5<L>      28 May 2018 03:20  Phos  1.7     05-28  Mg     1.7     05-28    TPro  4.9<L>  /  Alb  2.3<L>  /  TBili  1.0  /  DBili  x   /  AST  22  /  ALT  18  /  AlkPhos  57  05-28    Creatinine Trend: 1.54<--, 1.12<--, 1.46<--, 1.35<--, 1.51<--, see note<--  PT/INR - ( 28 May 2018 03:20 )   PT: 16.5 SEC;   INR: 1.42        PTT - ( 28 May 2018 03:20 )  PTT:25.3 SEC    Venous Blood Gas:  05-26 @ 23:44  7.37/33/<43/19/74  VBG Lactate: --    MICROBIOLOGY:     Culture - Blood (collected 26 May 2018 23:17)  Source: .Blood Blood  Preliminary Report (28 May 2018 01:02):    No growth to date.    Culture - Blood (collected 26 May 2018 23:17)  Source: .Blood Blood  Preliminary Report (28 May 2018 01:02):    No growth to date.    Culture - Urine (collected 26 May 2018 09:39)  Source: .Urine Clean Catch (Midstream)  Preliminary Report (27 May 2018 11:35):    >100,000 CFU/ml Escherichia coli

## 2018-05-29 LAB
ALBUMIN SERPL ELPH-MCNC: 2.3 G/DL — LOW (ref 3.3–5)
ALP SERPL-CCNC: 71 U/L — SIGNIFICANT CHANGE UP (ref 40–120)
ALT FLD-CCNC: 18 U/L — SIGNIFICANT CHANGE UP (ref 4–41)
APTT BLD: 25.1 SEC — LOW (ref 27.5–37.4)
AST SERPL-CCNC: 25 U/L — SIGNIFICANT CHANGE UP (ref 4–40)
BILIRUB SERPL-MCNC: 0.9 MG/DL — SIGNIFICANT CHANGE UP (ref 0.2–1.2)
BUN SERPL-MCNC: 25 MG/DL — HIGH (ref 7–23)
CALCIUM SERPL-MCNC: 7.6 MG/DL — LOW (ref 8.4–10.5)
CHLORIDE SERPL-SCNC: 106 MMOL/L — SIGNIFICANT CHANGE UP (ref 98–107)
CO2 SERPL-SCNC: 23 MMOL/L — SIGNIFICANT CHANGE UP (ref 22–31)
CREAT SERPL-MCNC: 1.16 MG/DL — SIGNIFICANT CHANGE UP (ref 0.5–1.3)
GLUCOSE SERPL-MCNC: 136 MG/DL — HIGH (ref 70–99)
HCT VFR BLD CALC: 23.6 % — LOW (ref 39–50)
HCT VFR BLD CALC: 23.7 % — LOW (ref 39–50)
HGB BLD-MCNC: 8.1 G/DL — LOW (ref 13–17)
HGB BLD-MCNC: 8.1 G/DL — LOW (ref 13–17)
INR BLD: 1.45 — HIGH (ref 0.88–1.17)
MAGNESIUM SERPL-MCNC: 1.7 MG/DL — SIGNIFICANT CHANGE UP (ref 1.6–2.6)
MCHC RBC-ENTMCNC: 29.3 PG — SIGNIFICANT CHANGE UP (ref 27–34)
MCHC RBC-ENTMCNC: 29.6 PG — SIGNIFICANT CHANGE UP (ref 27–34)
MCHC RBC-ENTMCNC: 34.2 % — SIGNIFICANT CHANGE UP (ref 32–36)
MCHC RBC-ENTMCNC: 34.3 % — SIGNIFICANT CHANGE UP (ref 32–36)
MCV RBC AUTO: 85.5 FL — SIGNIFICANT CHANGE UP (ref 80–100)
MCV RBC AUTO: 86.5 FL — SIGNIFICANT CHANGE UP (ref 80–100)
NRBC # FLD: 0 — SIGNIFICANT CHANGE UP
NRBC # FLD: 0 — SIGNIFICANT CHANGE UP
PHOSPHATE SERPL-MCNC: 1.6 MG/DL — LOW (ref 2.5–4.5)
PLATELET # BLD AUTO: 117 K/UL — LOW (ref 150–400)
PLATELET # BLD AUTO: 123 K/UL — LOW (ref 150–400)
PMV BLD: 10.2 FL — SIGNIFICANT CHANGE UP (ref 7–13)
PMV BLD: 10.6 FL — SIGNIFICANT CHANGE UP (ref 7–13)
POTASSIUM SERPL-MCNC: 3.8 MMOL/L — SIGNIFICANT CHANGE UP (ref 3.5–5.3)
POTASSIUM SERPL-SCNC: 3.8 MMOL/L — SIGNIFICANT CHANGE UP (ref 3.5–5.3)
PROT SERPL-MCNC: 4.9 G/DL — LOW (ref 6–8.3)
PROTHROM AB SERPL-ACNC: 16.2 SEC — HIGH (ref 9.8–13.1)
RBC # BLD: 2.74 M/UL — LOW (ref 4.2–5.8)
RBC # BLD: 2.76 M/UL — LOW (ref 4.2–5.8)
RBC # FLD: 16.1 % — HIGH (ref 10.3–14.5)
RBC # FLD: 16.3 % — HIGH (ref 10.3–14.5)
SODIUM SERPL-SCNC: 139 MMOL/L — SIGNIFICANT CHANGE UP (ref 135–145)
WBC # BLD: 8.7 K/UL — SIGNIFICANT CHANGE UP (ref 3.8–10.5)
WBC # BLD: 9.06 K/UL — SIGNIFICANT CHANGE UP (ref 3.8–10.5)
WBC # FLD AUTO: 8.7 K/UL — SIGNIFICANT CHANGE UP (ref 3.8–10.5)
WBC # FLD AUTO: 9.06 K/UL — SIGNIFICANT CHANGE UP (ref 3.8–10.5)

## 2018-05-29 PROCEDURE — 99232 SBSQ HOSP IP/OBS MODERATE 35: CPT | Mod: GC

## 2018-05-29 PROCEDURE — 99291 CRITICAL CARE FIRST HOUR: CPT

## 2018-05-29 RX ORDER — ACETAMINOPHEN 500 MG
650 TABLET ORAL ONCE
Qty: 0 | Refills: 0 | Status: DISCONTINUED | OUTPATIENT
Start: 2018-05-29 | End: 2018-05-30

## 2018-05-29 RX ORDER — PROPRANOLOL HCL 160 MG
10 CAPSULE, EXTENDED RELEASE 24HR ORAL
Qty: 0 | Refills: 0 | Status: DISCONTINUED | OUTPATIENT
Start: 2018-05-29 | End: 2018-05-29

## 2018-05-29 RX ORDER — CEFTRIAXONE 500 MG/1
INJECTION, POWDER, FOR SOLUTION INTRAMUSCULAR; INTRAVENOUS
Qty: 0 | Refills: 0 | Status: DISCONTINUED | OUTPATIENT
Start: 2018-05-29 | End: 2018-06-01

## 2018-05-29 RX ORDER — ACETAMINOPHEN 500 MG
650 TABLET ORAL EVERY 6 HOURS
Qty: 0 | Refills: 0 | Status: DISCONTINUED | OUTPATIENT
Start: 2018-05-29 | End: 2018-05-30

## 2018-05-29 RX ORDER — PROPRANOLOL HCL 160 MG
10 CAPSULE, EXTENDED RELEASE 24HR ORAL
Qty: 0 | Refills: 0 | Status: DISCONTINUED | OUTPATIENT
Start: 2018-05-29 | End: 2018-05-31

## 2018-05-29 RX ORDER — CEFTRIAXONE 500 MG/1
1 INJECTION, POWDER, FOR SOLUTION INTRAMUSCULAR; INTRAVENOUS EVERY 24 HOURS
Qty: 0 | Refills: 0 | Status: DISCONTINUED | OUTPATIENT
Start: 2018-05-30 | End: 2018-06-01

## 2018-05-29 RX ORDER — PANTOPRAZOLE SODIUM 20 MG/1
40 TABLET, DELAYED RELEASE ORAL EVERY 12 HOURS
Qty: 0 | Refills: 0 | Status: DISCONTINUED | OUTPATIENT
Start: 2018-05-29 | End: 2018-05-30

## 2018-05-29 RX ORDER — CEFTRIAXONE 500 MG/1
1 INJECTION, POWDER, FOR SOLUTION INTRAMUSCULAR; INTRAVENOUS ONCE
Qty: 0 | Refills: 0 | Status: COMPLETED | OUTPATIENT
Start: 2018-05-29 | End: 2018-05-29

## 2018-05-29 RX ADMIN — PANTOPRAZOLE SODIUM 40 MILLIGRAM(S): 20 TABLET, DELAYED RELEASE ORAL at 17:24

## 2018-05-29 RX ADMIN — PANTOPRAZOLE SODIUM 10 MG/HR: 20 TABLET, DELAYED RELEASE ORAL at 07:47

## 2018-05-29 RX ADMIN — CEFTRIAXONE 100 GRAM(S): 500 INJECTION, POWDER, FOR SOLUTION INTRAMUSCULAR; INTRAVENOUS at 11:01

## 2018-05-29 RX ADMIN — Medication 10 MILLIGRAM(S): at 17:24

## 2018-05-29 RX ADMIN — Medication 3 MILLIGRAM(S): at 22:34

## 2018-05-29 RX ADMIN — OCTREOTIDE ACETATE 10 MICROGRAM(S)/HR: 200 INJECTION, SOLUTION INTRAVENOUS; SUBCUTANEOUS at 07:46

## 2018-05-29 RX ADMIN — Medication 650 MILLIGRAM(S): at 23:05

## 2018-05-29 RX ADMIN — Medication 63.75 MILLIMOLE(S): at 09:48

## 2018-05-29 RX ADMIN — Medication 650 MILLIGRAM(S): at 22:34

## 2018-05-29 RX ADMIN — CHLORHEXIDINE GLUCONATE 1 APPLICATION(S): 213 SOLUTION TOPICAL at 10:48

## 2018-05-29 RX ADMIN — Medication 63.75 MILLIMOLE(S): at 05:45

## 2018-05-29 NOTE — PROGRESS NOTE ADULT - ASSESSMENT
Impression:  79yo M with EtOH abuse, alcoholic cirrhosis, HTN, distant hx Guillain-[place], chronic back pain, admitted to [facility] for lower abd pain and chest pain, transferred to [facility] for GIB.    Problem List:  1) Acute variceal bleed now s/p EBL of esophageal varices   2) Acute blood loss anemia - 2/2 above  3) ETOH Cirrhosis, previously on transplant list but no longer - MELD-Na 15       - varices: present s/p EBL 5/27       - ascites: none on CT       - SPE: none, on lactulose at home       - HCC: unknown        - coagulopathy - INR elevated, thrombocytopenia       - etiology - likely ETOH but will perform further serologic eval when stable  4) Porcelain GB   5) Possible PD or CBD stone without ductal dilatation  6) SMA stenosis     Plan:  - continue to trend CBC q 8 hours, transfuse if Hgb <7  - monitor for further overt GI bleeding today  - NPO  - octreotide gtt, protonix gtt x 3 days   - Zosyn x 5 days  - when stable, can get MRI/MRCP for further evaluation of biliary tree, gallbladder, and liver (to eval for HCC)  - follouwup: Hep B surface Ab, KERRI, ASMA, total IgG, AMA, ceruloplasmin, alpha 1 antitrypsin  - follouwup vascular recs for eval of mesenteric ischemia given SMA stenosis

## 2018-05-29 NOTE — PROGRESS NOTE ADULT - ASSESSMENT
78 M with alcoholic cirrhosis presents with GI bleed. Also noted to have a SMA stenosis. Celiac artery and MERVIN are patent. It is unclear if the patient's lower abdominal pain is related at this time.  -Recommend mesenteric duplex to assess celiac artery, SMA, and MERVIN    d/w Dr. Lanny Hatfield, PGY2  # [phone]

## 2018-05-29 NOTE — PROGRESS NOTE ADULT - SUBJECTIVE AND OBJECTIVE BOX
*******************************  Hugo MikesLisbeth, PGY-1  Pager [phone]  *******************************    INTERVAL HPI/OVERNIGHT EVENTS: MAURO overnight. Pt complaining of some moderate throat pain. No further melena     SUBJECTIVE: Patient seen and examined at bedside.     CONSTITUTIONAL: No weakness, fevers or chills  EYES/ENT: No visual changes;  No vertigo or throat pain   NECK: No stiffness. Moderate throat pain  RESPIRATORY: No cough, wheezing, hemoptysis; No shortness of breath  CARDIOVASCULAR: No chest pain or palpitations  GASTROINTESTINAL: No abdominal or epigastric pain. No nausea, vomiting, or hematemesis; No diarrhea or constipation. No melena or hematochezia.  GENITOURINARY: No dysuria, frequency or hematuria  NEUROLOGICAL: No numbness or weakness  SKIN: No itching, rashes    OBJECTIVE:    VITAL SIGNS:  ICU Vital Signs Last 24 Hrs  T(C): 36.7 (29 May 2018 04:00), Max: 37 (28 May 2018 20:00)  T(F): 98 (29 May 2018 04:00), Max: 98.6 (28 May 2018 20:00)  HR: 90 (29 May 2018 11:00) (74 - 125)  BP: 132/65 (29 May 2018 11:00) (100/84 - 154/72)  BP(mean): 82 (29 May 2018 11:00) (63 - 97)  ABP: --  ABP(mean): --  RR: 23 (29 May 2018 11:00) (15 - 30)  SpO2: 97% (29 May 2018 10:00) (93% - 100%)        05-28 @ 07:01  -  05-29 @ 07:00  --------------------------------------------------------  IN: 1747 mL / OUT: 2010 mL / NET: -263 mL    05-29 @ 07:01  - 05-29 @ 12:26  --------------------------------------------------------  IN: 648 mL / OUT: 500 mL / NET: 148 mL      CAPILLARY BLOOD GLUCOSE      POCT Blood Glucose.: 204 mg/dL (28 May 2018 17:42)        PHYSICAL EXAM:  GENERAL: NAD, well-developed  HEAD:  Atraumatic, Normocephalic  EYES: EOMI, PERRLA, conjunctiva and sclera clear  NECK: Supple, No JVD  CHEST/LUNG: mild bibasilar crackles.   HEART: Regular rate and rhythm; systolic ejection murmur loudest at left sternal border  ABDOMEN: Soft, Nontender, Nondistended; Bowel sounds present  EXTREMITIES:  2+ Peripheral Pulses, No clubbing, cyanosis, or edema  PSYCH: AAOx3  NEUROLOGY: non-focal  SKIN: No rashes or lesions      MEDICATIONS:  MEDICATIONS  (STANDING):  cefTRIAXone   IVPB      chlorhexidine 4% Liquid 1 Application(s) Topical <User Schedule>  melatonin 3 milliGRAM(s) Oral at bedtime  octreotide  Infusion 50 MICROgram(s)/Hr (10 mL/Hr) IV Continuous <Continuous>  pantoprazole  Injectable 40 milliGRAM(s) IV Push every 12 hours  propranolol 10 milliGRAM(s) Oral two times a day    MEDICATIONS  (PRN):      ALLERGIES:  Allergies    Allergy Status Unknown  No Known Allergies    Intolerances        LABS:                        8.1    8.70  )-----------( 123      ( 29 May 2018 09:20 )             23.7     05-29    139  |  106  |  25<H>  ----------------------------<  136<H>  3.8   |  23  |  1.16    Ca    7.6<L>      29 May 2018 03:50  Phos  1.6     05-29  Mg     1.7     05-29    TPro  4.9<L>  /  Alb  2.3<L>  /  TBili  0.9  /  DBili  x   /  AST  25  /  ALT  18  /  AlkPhos  71  05-29    PT/INR - ( 29 May 2018 03:50 )   PT: 16.2 SEC;   INR: 1.45          PTT - ( 29 May 2018 03:50 )  PTT:25.1 SEC      RADIOLOGY & ADDITIONAL TESTS: Reviewed.

## 2018-05-29 NOTE — CHART NOTE - NSCHARTNOTEFT_GEN_A_CORE
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 Pt started on PPI gtt, and Octreotide gtt.  Pt transfused total of 4u PRBCs, 4u FFP, and 2u Plts. Right IJ central line placed, and pt intubated for high risk endoscopy. EGD found 2 columns of bleeding varices which received banding x4, heavy gastric clot burden with expected residual melena, and no signs of active bleeding. Pt subsequently extubated on 5/28. CBCs monitored and pts Hgb downtrended, requiring an extra 1u PRBCs. Pts Hgb subsequently remained stable, and PPI gtt transitioned to IV PPI BID.         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[place], chronic back pain, admitted to [facility] for lower abd pain and chest pain, transferred to [facility] for GIB w/ coffee ground emesis and BRBPR, concerned for variceal bleed versus bleeding peptic ulcer    # Neuro - no hx of neurologic dz; no active issues    # Cardiovascular - Pt in hemorrhagic shock from active GIB c/b vasoplegic shock in the setting of UTI  - Continue blood transfusion; maintain active T&S, keep Hgb > 7; will transfuse plts/pRBC/FFP as needed  - Continue holding home BP meds  - c/w Ceftriaxone as UTI is sensitive    # Pulmonary - no hx of pulmonary dz; s/p extubation  - No acute issue; saturating well on RA    # Skin  - Right IJ central line in place. will d/c today    # GI - alcoholic cirrhosis with 2 columns of bleeding varices s/p 4 banding; heavy gastric clot burden will have melena as a result; no active bleeding on EGD, no signs of gastric varices or ulcers; normal duodenal bulb; pt now s/p extubation  - will switch PPI gtt to IV BID. 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 likely KAY from prerenal azotemia in the setting of acute blood loss  - Trend BUN/Cr    # Hematology - no hx of hematologic malignancy, hgb originally downtrending in setting of acute blood loss 2/2 GIB  - Hgb now stable   - Trend CBC; maintain active T&S, hgb > 7; transfuse pRBCs, plts, and FFP as needed  - no AC in setting of acute bleed; SCDs for now    # Infectious disease- UA positive.  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 Pt started on PPI gtt, and Octreotide gtt.  Pt transfused total of 4u PRBCs, 4u FFP, and 2u Plts. Right IJ central line placed, and pt intubated for high risk endoscopy. EGD found 2 columns of bleeding varices which received banding x4, heavy gastric clot burden with expected residual melena, and no signs of active bleeding. Pt subsequently extubated on 5/28. CBCs monitored and pts Hgb downtrended, requiring an extra 1u PRBCs. Pts Hgb subsequently remained stable, and PPI gtt transitioned to IV PPI BID. Pt continued to improve clinically, and is stable for transfer to the general floors.         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[place], chronic back pain, admitted to [facility] for lower abd pain and chest pain, transferred to [facility] for GIB w/ coffee ground emesis and BRBPR, concerned for variceal bleed versus bleeding peptic ulcer    # Neuro - no hx of neurologic dz; no active issues    # Cardiovascular - Pt in hemorrhagic shock from active GIB c/b vasoplegic shock in the setting of UTI  - Continue blood transfusion; maintain active T&S, keep Hgb > 7; will transfuse plts/pRBC/FFP as needed  - Continue holding home BP meds  - c/w Ceftriaxone as UTI is sensitive    # Pulmonary - no hx of pulmonary dz; s/p extubation  - No acute issue; saturating well on RA    # Skin  - Right IJ central line in place. will d/c today    # GI - alcoholic cirrhosis with 2 columns of bleeding varices s/p 4 banding; heavy gastric clot burden will have melena as a result; no active bleeding on EGD, no signs of gastric varices or ulcers; normal duodenal bulb; pt now s/p extubation  - will switch PPI gtt to IV BID. 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 likely KAY from prerenal azotemia in the setting of acute blood loss  - Trend BUN/Cr    # Hematology - no hx of hematologic malignancy, hgb originally downtrending in setting of acute blood loss 2/2 GIB  - Hgb now stable   - Trend CBC; maintain active T&S, hgb > 7; transfuse pRBCs, plts, and FFP as needed  - no AC in setting of acute bleed; SCDs for now    # Infectious disease- UA positive.  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 Lovett  Signout given to: MAR    MICU COURSE:   Pt admitted to MICU for acute blood loss anemia 2/2 variceal bleed. Pt started on PPI gtt, and Octreotide gtt.  Pt transfused total of 4u PRBCs, 4u FFP, and 2u Plts. Right IJ central line placed, and pt intubated for high risk endoscopy. EGD found 2 columns of bleeding varices which received banding x4, heavy gastric clot burden with expected residual melena, and no signs of active bleeding. Pt subsequently extubated on 5/28. CBCs monitored and pts Hgb downtrended, requiring an extra 1u PRBCs. Pts Hgb subsequently remained stable, and PPI gtt transitioned to IV PPI BID. Pt continued to improve clinically, and is stable for transfer to the general floors.         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[place], chronic back pain, admitted to [facility] for lower abd pain and chest pain, transferred to [facility] for GIB w/ coffee ground emesis and BRBPR, concerned for variceal bleed versus bleeding peptic ulcer    # Neuro - no hx of neurologic dz; no active issues    # Cardiovascular - Pt in hemorrhagic shock from active GIB c/b vasoplegic shock in the setting of UTI  - Continue blood transfusion; maintain active T&S, keep Hgb > 7; will transfuse plts/pRBC/FFP as needed  - Continue holding home BP meds  - c/w Ceftriaxone as UTI is sensitive    # Pulmonary - no hx of pulmonary dz; s/p extubation  - No acute issue; saturating well on RA    # Skin  - Right IJ central line in place. will d/c today    # GI - alcoholic cirrhosis with 2 columns of bleeding varices s/p 4 banding; heavy gastric clot burden will have melena as a result; no active bleeding on EGD, no signs of gastric varices or ulcers; normal duodenal bulb; pt now s/p extubation  - will switch PPI gtt to IV BID. 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 likely KAY from prerenal azotemia in the setting of acute blood loss  - Trend BUN/Cr    # Hematology - no hx of hematologic malignancy, hgb originally downtrending in setting of acute blood loss 2/2 GIB  - Hgb now stable   - Trend CBC; maintain active T&S, hgb > 7; transfuse pRBCs, plts, and FFP as needed  - no AC in setting of acute bleed; SCDs for now    # Infectious disease- UA positive.  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

## 2018-05-29 NOTE — PROGRESS NOTE ADULT - SUBJECTIVE AND OBJECTIVE BOX
Patient seen and examined. He received 1 unit pRBC yesterday afternoon. He states he has not had any more vomiting of blood.    T(C): 36.7 (05-29-18 @ 04:00), Max: 37 (05-28-18 @ 20:00)  HR: 74 (05-29-18 @ 08:00) (66 - 125)  BP: 146/62 (05-29-18 @ 08:00) (91/39 - 146/62)  RR: 23 (05-29-18 @ 08:00) (15 - 30)  SpO2: 93% (05-29-18 @ 08:00) (93% - 100%)  Wt(kg): --  05-28 @ 07:01  -  05-29 @ 07:00  --------------------------------------------------------  IN:    IV PiggyBack: 500 mL    norepinephrine Infusion: 47 mL    octreotide  Infusion: 240 mL    Oral Fluid: 420 mL    Packed Red Blood Cells: 300 mL    pantoprazole Infusion: 240 mL  Total IN: 1747 mL    OUT:    Indwelling Catheter - Urethral: 1760 mL    Stool: 250 mL  Total OUT: 2010 mL    Total NET: -263 mL      05-29 @ 07:01  -  05-29 @ 09:24  --------------------------------------------------------  IN:    octreotide  Infusion: 10 mL    pantoprazole Infusion: 10 mL  Total IN: 20 mL    OUT:    Indwelling Catheter - Urethral: 250 mL  Total OUT: 250 mL    Total NET: -230 mL    Gen: NAD  Lungs: Non-labored breathing  Heart: S1, S2  Abdomen: Soft, ND, minimal tenderness throughout.  Vascular: Palpable femoral pulses bilaterally    LABS:                        8.1    9.06  )-----------( 117      ( 29 May 2018 03:50 )             23.6     29 May 2018 03:50    139    |  106    |  25     ----------------------------<  136    3.8     |  23     |  1.16     Ca    7.6        29 May 2018 03:50  Phos  1.6       29 May 2018 03:50  Mg     1.7       29 May 2018 03:50    TPro  4.9    /  Alb  2.3    /  TBili  0.9    /  DBili  x      /  AST  25     /  ALT  18     /  AlkPhos  71     29 May 2018 03:50    PT/INR - ( 29 May 2018 03:50 )   PT: 16.2 SEC;   INR: 1.45          PTT - ( 29 May 2018 03:50 )  PTT:25.1 SEC

## 2018-05-29 NOTE — PROGRESS NOTE ADULT - ASSESSMENT
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[place], chronic back pain, admitted to [facility] for lower abd pain and chest pain, transferred to [facility] for GIB w/ coffee ground emesis and BRBPR, concerned for variceal bleed versus bleeding peptic ulcer    # Neuro - no hx of neurologic dz; no active issues    # Cardiovascular - Pt in hemorrhagic shock from active GIB c/b vasoplegic shock in the setting of UTI  - Continue blood transfusion; maintain active T&S, keep Hgb > 7; will transfuse plts/pRBC/FFP as needed  - Continue holding home BP meds  - c/w Ceftriaxone as UTI is sensitive    # Pulmonary - no hx of pulmonary dz; s/p extubation  - No acute issue; saturating well on RA    # Skin  - Right IJ central line in place. will d/c today    # GI - alcoholic cirrhosis with 2 columns of bleeding varices s/p 4 banding; heavy gastric clot burden will have melena as a result; no active bleeding on EGD, no signs of gastric varices or ulcers; normal duodenal bulb; pt now s/p extubation  - will switch PPI gtt to IV BID. 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 likely KAY from prerenal azotemia in the setting of acute blood loss  - Trend BUN/Cr    # Hematology - no hx of hematologic malignancy, hgb originally downtrending in setting of acute blood loss 2/2 GIB  - Hgb now stable   - Trend CBC; maintain active T&S, hgb > 7; transfuse pRBCs, plts, and FFP as needed  - no AC in setting of acute bleed; SCDs for now    # Infectious disease- UA positive.  - Ceftriaxone to cover UTI and enteric-related organisms    # DVT prophylaxis  - SCDs in setting of acute bleed     # Ethics  - full code

## 2018-05-29 NOTE — PROGRESS NOTE ADULT - SUBJECTIVE AND OBJECTIVE BOX
HEPATOLOGY PROGRESS NOTE      Chief Complaint:  Patient is a 78y old  Male who presents with a chief complaint of GI bleed (27 May 2018 02:29)      Interval Events: Pt had 3 episodes of melena total in the last 24 hours and required 1 unit of blood after which his Hgb has been stable.  He is alert and talking.  He complains of lower abdominal pain.  He is off pressors.    Allergies:  Allergy Status Unknown  No Known Allergies      Hospital Medications:  chlorhexidine 4% Liquid 1 Application(s) Topical <User Schedule>  melatonin 3 milliGRAM(s) Oral at bedtime  octreotide  Infusion 50 MICROgram(s)/Hr IV Continuous <Continuous>  pantoprazole Infusion 8 mG/Hr IV Continuous <Continuous>      PMHX/PSHX:  Liver cirrhosis  Guillain-[place] syndrome  HTN (hypertension)  H/O inguinal hernia repair      Family history:  Family history of ovarian cancer (Sibling)      ROS:     General:  No wt loss, fevers, chills, night sweats, fatigue,   Eyes:  Good vision, no reported pain  ENT:  No sore throat, pain, runny nose, dysphagia  CV:  No pain, palpitations, hypo/hypertension  Resp:  No dyspnea, cough, tachypnea, wheezing  GI:  See HPI  :  No pain, bleeding, incontinence, nocturia  Muscle:  No pain, weakness  Neuro:  No weakness, tingling, memory problems  Psych:  No fatigue, insomnia, mood problems, depression  Endocrine:  No polyuria, polydipsia, cold/heat intolerance  Heme:  No petechiae, ecchymosis, easy bruisability  Skin:  No rash, edema      PHYSICAL EXAM:     GENERAL:  Appears stated age, well-groomed, well-nourished, no distress  HEENT:  NC/AT,  conjunctivae clear, sclera -anicteric  CHEST:  Full & symmetric excursion, no increased effort, breath sounds clear  HEART:  Regular rhythm, S1, S2, no murmur/rub/S3/S4,  no edema  ABDOMEN:  Soft, non-tender, non-distended, normoactive bowel sounds,  no masses ,no hepato-splenomegaly,   EXTREMITIES:  no cyanosis,clubbing or edema  SKIN:  No rash/erythema/ecchymoses/petechiae/wounds/abscess/warm/dry  NEURO:  Alert, oriented    Vital Signs:  Vital Signs Last 24 Hrs  T(C): 36.7 (29 May 2018 04:00), Max: 37 (28 May 2018 20:00)  T(F): 98 (29 May 2018 04:00), Max: 98.6 (28 May 2018 20:00)  HR: 74 (29 May 2018 08:00) (69 - 125)  BP: 146/62 (29 May 2018 08:00) (100/84 - 146/62)  BP(mean): 85 (29 May 2018 08:00) (63 - 88)  RR: 23 (29 May 2018 08:00) (15 - 30)  SpO2: 93% (29 May 2018 08:00) (93% - 100%)  Daily     Daily Weight in k.6 (29 May 2018 06:00)    LABS:                        8.1    8.70  )-----------( 123      ( 29 May 2018 09:20 )             23.7     05-29    139  |  106  |  25<H>  ----------------------------<  136<H>  3.8   |  23  |  1.16    Ca    7.6<L>      29 May 2018 03:50  Phos  1.6     05-29  Mg     1.7     05-29    TPro  4.9<L>  /  Alb  2.3<L>  /  TBili  0.9  /  DBili  x   /  AST  25  /  ALT  18  /  AlkPhos  71  05-29    LIVER FUNCTIONS - ( 29 May 2018 03:50 )  Alb: 2.3 g/dL / Pro: 4.9 g/dL / ALK PHOS: 71 u/L / ALT: 18 u/L / AST: 25 u/L / GGT: x           PT/INR - ( 29 May 2018 03:50 )   PT: 16.2 SEC;   INR: 1.45          PTT - ( 29 May 2018 03:50 )  PTT:25.1 SEC        Imaging:  no new imaging

## 2018-05-30 DIAGNOSIS — I10 ESSENTIAL (PRIMARY) HYPERTENSION: ICD-10-CM

## 2018-05-30 DIAGNOSIS — K74.60 UNSPECIFIED CIRRHOSIS OF LIVER: ICD-10-CM

## 2018-05-30 DIAGNOSIS — N17.9 ACUTE KIDNEY FAILURE, UNSPECIFIED: ICD-10-CM

## 2018-05-30 DIAGNOSIS — Z29.9 ENCOUNTER FOR PROPHYLACTIC MEASURES, UNSPECIFIED: ICD-10-CM

## 2018-05-30 DIAGNOSIS — K92.2 GASTROINTESTINAL HEMORRHAGE, UNSPECIFIED: ICD-10-CM

## 2018-05-30 LAB
ALBUMIN SERPL ELPH-MCNC: 2.1 G/DL — LOW (ref 3.3–5)
ALP SERPL-CCNC: 89 U/L — SIGNIFICANT CHANGE UP (ref 40–120)
ALT FLD-CCNC: 21 U/L — SIGNIFICANT CHANGE UP (ref 4–41)
APTT BLD: 27.5 SEC — SIGNIFICANT CHANGE UP (ref 27.5–37.4)
AST SERPL-CCNC: 34 U/L — SIGNIFICANT CHANGE UP (ref 4–40)
BILIRUB SERPL-MCNC: 0.6 MG/DL — SIGNIFICANT CHANGE UP (ref 0.2–1.2)
BUN SERPL-MCNC: 19 MG/DL — SIGNIFICANT CHANGE UP (ref 7–23)
CALCIUM SERPL-MCNC: 7.5 MG/DL — LOW (ref 8.4–10.5)
CHLORIDE SERPL-SCNC: 104 MMOL/L — SIGNIFICANT CHANGE UP (ref 98–107)
CO2 SERPL-SCNC: 23 MMOL/L — SIGNIFICANT CHANGE UP (ref 22–31)
CREAT SERPL-MCNC: 1.14 MG/DL — SIGNIFICANT CHANGE UP (ref 0.5–1.3)
GLUCOSE SERPL-MCNC: 154 MG/DL — HIGH (ref 70–99)
HCT VFR BLD CALC: 25.3 % — LOW (ref 39–50)
HGB BLD-MCNC: 8.3 G/DL — LOW (ref 13–17)
INR BLD: 1.46 — HIGH (ref 0.88–1.17)
MAGNESIUM SERPL-MCNC: 1.7 MG/DL — SIGNIFICANT CHANGE UP (ref 1.6–2.6)
MCHC RBC-ENTMCNC: 29.5 PG — SIGNIFICANT CHANGE UP (ref 27–34)
MCHC RBC-ENTMCNC: 32.8 % — SIGNIFICANT CHANGE UP (ref 32–36)
MCV RBC AUTO: 90 FL — SIGNIFICANT CHANGE UP (ref 80–100)
NRBC # FLD: 0 — SIGNIFICANT CHANGE UP
PHOSPHATE SERPL-MCNC: 2.3 MG/DL — LOW (ref 2.5–4.5)
PLATELET # BLD AUTO: 147 K/UL — LOW (ref 150–400)
PMV BLD: 10.7 FL — SIGNIFICANT CHANGE UP (ref 7–13)
POTASSIUM SERPL-MCNC: 4 MMOL/L — SIGNIFICANT CHANGE UP (ref 3.5–5.3)
POTASSIUM SERPL-SCNC: 4 MMOL/L — SIGNIFICANT CHANGE UP (ref 3.5–5.3)
PROT SERPL-MCNC: 4.7 G/DL — LOW (ref 6–8.3)
PROTHROM AB SERPL-ACNC: 16.9 SEC — HIGH (ref 9.8–13.1)
RBC # BLD: 2.81 M/UL — LOW (ref 4.2–5.8)
RBC # FLD: 16.6 % — HIGH (ref 10.3–14.5)
SODIUM SERPL-SCNC: 139 MMOL/L — SIGNIFICANT CHANGE UP (ref 135–145)
WBC # BLD: 8.95 K/UL — SIGNIFICANT CHANGE UP (ref 3.8–10.5)
WBC # FLD AUTO: 8.95 K/UL — SIGNIFICANT CHANGE UP (ref 3.8–10.5)

## 2018-05-30 PROCEDURE — 99232 SBSQ HOSP IP/OBS MODERATE 35: CPT | Mod: GC

## 2018-05-30 PROCEDURE — 99233 SBSQ HOSP IP/OBS HIGH 50: CPT | Mod: GC

## 2018-05-30 PROCEDURE — 93975 VASCULAR STUDY: CPT | Mod: 26

## 2018-05-30 RX ORDER — SODIUM,POTASSIUM PHOSPHATES 278-250MG
1 POWDER IN PACKET (EA) ORAL
Qty: 0 | Refills: 0 | Status: COMPLETED | OUTPATIENT
Start: 2018-05-30 | End: 2018-05-31

## 2018-05-30 RX ORDER — OCTREOTIDE ACETATE 200 UG/ML
50 INJECTION, SOLUTION INTRAVENOUS; SUBCUTANEOUS
Qty: 500 | Refills: 0 | Status: DISCONTINUED | OUTPATIENT
Start: 2018-05-30 | End: 2018-06-01

## 2018-05-30 RX ORDER — LIDOCAINE 4 G/100G
1 CREAM TOPICAL EVERY OTHER DAY
Qty: 0 | Refills: 0 | Status: DISCONTINUED | OUTPATIENT
Start: 2018-05-30 | End: 2018-06-07

## 2018-05-30 RX ORDER — PANTOPRAZOLE SODIUM 20 MG/1
40 TABLET, DELAYED RELEASE ORAL
Qty: 0 | Refills: 0 | Status: DISCONTINUED | OUTPATIENT
Start: 2018-05-30 | End: 2018-06-07

## 2018-05-30 RX ADMIN — CEFTRIAXONE 100 GRAM(S): 500 INJECTION, POWDER, FOR SOLUTION INTRAMUSCULAR; INTRAVENOUS at 12:03

## 2018-05-30 RX ADMIN — Medication 1 TABLET(S): at 22:06

## 2018-05-30 RX ADMIN — OCTREOTIDE ACETATE 10 MICROGRAM(S)/HR: 200 INJECTION, SOLUTION INTRAVENOUS; SUBCUTANEOUS at 18:14

## 2018-05-30 RX ADMIN — Medication 1 TABLET(S): at 18:14

## 2018-05-30 RX ADMIN — Medication 10 MILLIGRAM(S): at 05:22

## 2018-05-30 RX ADMIN — Medication 3 MILLIGRAM(S): at 22:06

## 2018-05-30 RX ADMIN — LIDOCAINE 1 PATCH: 4 CREAM TOPICAL at 12:03

## 2018-05-30 RX ADMIN — PANTOPRAZOLE SODIUM 40 MILLIGRAM(S): 20 TABLET, DELAYED RELEASE ORAL at 05:22

## 2018-05-30 RX ADMIN — PANTOPRAZOLE SODIUM 40 MILLIGRAM(S): 20 TABLET, DELAYED RELEASE ORAL at 18:14

## 2018-05-30 RX ADMIN — Medication 1 TABLET(S): at 12:02

## 2018-05-30 NOTE — PHYSICAL THERAPY INITIAL EVALUATION ADULT - GENERAL OBSERVATIONS, REHAB EVAL
Consult received, chart reviewed. Patient received supine in bed, NAD, wife present. Patient agreed to Evaluation from Physical Therapist.

## 2018-05-30 NOTE — PHYSICAL THERAPY INITIAL EVALUATION ADULT - PERTINENT HX OF CURRENT PROBLEM, REHAB EVAL
Pt. admitted for GI bleed. Per documentation, pt. intubated for high risk endoscopy. EGD found 2 columns of bleeding varices which received banding x4, heavy gastric clot burden with expected residual melena, and no signs of active bleeding. Pt subsequently extubated on 5/28. 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

## 2018-05-30 NOTE — PROGRESS NOTE ADULT - PROBLEM SELECTOR PLAN 3
Currently normotensive  -Holding home antihypertensives in setting of GI bleed Serum Cr on admission was 1.66, most likely prerenal in the setting of hypovolemia 2/2 UGIB; now wnl  - renally dose all meds  - avoid nephrotoxins

## 2018-05-30 NOTE — CHART NOTE - NSCHARTNOTEFT_GEN_A_CORE
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  Prior to admission, pt had been taking naproxen bid x 2-3 weeks for low back pain.Pt admitted to MICU for acute blood loss anemia 2/2 variceal bleed. Pt started on PPI gtt, and Octreotide gtt.  Pt transfused total of 4u PRBCs, 4u FFP, and 2u Plts. Right IJ central line placed, and pt intubated for high risk endoscopy. EGD found 2 columns of bleeding varices which received banding x4, heavy gastric clot burden with expected residual melena, and no signs of active bleeding. Pt subsequently extubated on 5/28. CBCs monitored and pts Hgb downtrended, requiring an extra 1u PRBCs. Pts Hgb subsequently remained stable, and PPI gtt transitioned to IV PPI BID. Pt was transferred to floor.      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  Prior to admission, pt had been taking naproxen bid x 2-3 weeks for low back pain.Pt admitted to MICU for acute blood loss anemia 2/2 variceal bleed. Pt started on PPI gtt, and Octreotide gtt.  Pt transfused total of 4u PRBCs, 4u FFP, and 2u Plts. Right IJ central line placed, and pt intubated for high risk endoscopy. EGD found 2 columns of bleeding varices which received banding x4, heavy gastric clot burden with expected residual melena, and no signs of active bleeding. Pt subsequently extubated on 5/28. CBCs monitored and pts Hgb downtrended, requiring an extra 1u PRBCs. Pts Hgb subsequently remained stable, and PPI gtt transitioned to IV PPI BID. Pt was transferred to floor.      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      Adelso Costa MD  MAR [id number]

## 2018-05-30 NOTE — PROGRESS NOTE ADULT - PROBLEM SELECTOR PLAN 2
Has hx of EtOH abuse  - tx of UGIB as above  - holding home lactulose Has hx of EtOH abuse. Viral hepatitis panel negative,   - tx of UGIB as above  - when stable, pt to get MRI/MRCP for further evaluation of biliary tree, gallbladder, and liver (to eval for HCC)  - send other hepatitis tests  - holding home lactulose

## 2018-05-30 NOTE — PROGRESS NOTE ADULT - PROBLEM SELECTOR PLAN 4
DVT ppx: holding chemical ppx in setting of GI bleed  Diet: soft with ensure supplementation Currently normotensive  -Holding home antihypertensives in setting of GI bleed

## 2018-05-30 NOTE — PHYSICAL THERAPY INITIAL EVALUATION ADULT - CRITERIA FOR SKILLED THERAPEUTIC INTERVENTIONS
impairments found/therapy frequency/anticipated discharge recommendation/rehab potential/predicted duration of therapy intervention/anticipated equipment needs at discharge

## 2018-05-30 NOTE — PROGRESS NOTE ADULT - SUBJECTIVE AND OBJECTIVE BOX
PROVIDER CONTACT INFO:  MD ALISHA Gregg Pager: [phone]  [place] Pager: [phone]    KETAN TIPTON  78y  Male      Patient is a 78y old  Male who presents with a chief complaint of GI bleed (27 May 2018 02:29)      INTERVAL HPI/OVERNIGHT EVENTS: No overnight events    T(C): 37.1 (05-30-18 @ 05:14), Max: 37.1 (05-30-18 @ 05:14)  HR: 60 (05-30-18 @ 05:14) (60 - 90)  BP: 111/63 (05-30-18 @ 05:14) (100/63 - 154/72)  RR: 19 (05-30-18 @ 05:14) (18 - 30)  SpO2: 97% (05-30-18 @ 05:14) (93% - 99%)  Wt(kg): --Vital Signs Last 24 Hrs  T(C): 37.1 (30 May 2018 05:14), Max: 37.1 (30 May 2018 05:14)  T(F): 98.7 (30 May 2018 05:14), Max: 98.7 (30 May 2018 05:14)  HR: 60 (30 May 2018 05:14) (60 - 90)  BP: 111/63 (30 May 2018 05:14) (100/63 - 154/72)  BP(mean): 83 (29 May 2018 21:00) (80 - 115)  RR: 19 (30 May 2018 05:14) (18 - 30)  SpO2: 97% (30 May 2018 05:14) (93% - 99%)    PHYSICAL EXAM:  GENERAL: NAD, well-groomed, well-developed  HEAD:  Atraumatic, Normocephalic  EYES: EOMI, PERRLA, conjunctiva and sclera clear  ENMT: No tonsillar erythema, exudates,; Moist mucous membranes. No lesions  NECK: Supple, No JVD, Normal thyroid  CHEST/LUNG: Clear to percussion bilaterally; No rales, rhonchi, wheezing, or rubs  HEART: Regular rate and rhythm; No murmurs, rubs, or gallops  ABDOMEN: Soft, Nontender, Nondistended; Bowel sounds present.  No hepatosplenomegaly  EXTREMITIES:  2+ Peripheral Pulses, No clubbing, cyanosis, or edema  LYMPH: No lymphadenopathy noted  SKIN: No rashes or lesions  PSYCH: Alert & Oriented x3  NERVOUS SYSTEM:  ; Motor Strength 5/5 B/L upper and lower extremities.  Sensory intact    Consultant(s) Notes Reviewed:  [x ] YES  [ ] NO  Care Discussed with Consultants/Other Providers [ x] YES  [ ] NO    LABS:                        8.1    8.70  )-----------( 123      ( 29 May 2018 09:20 )             23.7     05-29    139  |  106  |  25<H>  ----------------------------<  136<H>  3.8   |  23  |  1.16    Ca    7.6<L>      29 May 2018 03:50  Phos  1.6     05-29  Mg     1.7     05-29    TPro  4.9<L>  /  Alb  2.3<L>  /  TBili  0.9  /  DBili  x   /  AST  25  /  ALT  18  /  AlkPhos  71  05-29    PT/INR - ( 30 May 2018 05:10 )   PT: 16.9 SEC;   INR: 1.46          PTT - ( 30 May 2018 05:10 )  PTT:27.5 SEC PROVIDER CONTACT INFO:  MD ALISHA Gregg Pager: [phone]  [place] Pager: [phone]    KETAN TIPTON  78y  Male      Patient is a 78y old  Male who presents with a chief complaint of GI bleed (27 May 2018 02:29)      INTERVAL HPI/OVERNIGHT EVENTS: No overnight events. This AM pt c/o muscular pain in left side of neck. He had one BM yesterday, is unsure of how it looked. Denies F/C/N/V, CP/SOB, abd pain, dysuria.     T(C): 37.1 (05-30-18 @ 05:14), Max: 37.1 (05-30-18 @ 05:14)  HR: 60 (05-30-18 @ 05:14) (60 - 90)  BP: 111/63 (05-30-18 @ 05:14) (100/63 - 154/72)  RR: 19 (05-30-18 @ 05:14) (18 - 30)  SpO2: 97% (05-30-18 @ 05:14) (93% - 99%)  Wt(kg): --Vital Signs Last 24 Hrs  T(C): 37.1 (30 May 2018 05:14), Max: 37.1 (30 May 2018 05:14)  T(F): 98.7 (30 May 2018 05:14), Max: 98.7 (30 May 2018 05:14)  HR: 60 (30 May 2018 05:14) (60 - 90)  BP: 111/63 (30 May 2018 05:14) (100/63 - 154/72)  BP(mean): 83 (29 May 2018 21:00) (80 - 115)  RR: 19 (30 May 2018 05:14) (18 - 30)  SpO2: 97% (30 May 2018 05:14) (93% - 99%)    PHYSICAL EXAM:  GENERAL: NAD, well-groomed, well-developed  HEAD:  Atraumatic, Normocephalic  EYES: EOMI, PERRLA, conjunctiva and sclera clear  ENMT: No tonsillar erythema, exudates,; Moist mucous membranes. No lesions  NECK: Supple, No JVD, Normal thyroid  CHEST/LUNG: Clear to percussion bilaterally; No rales, rhonchi, wheezing, or rubs  HEART: Regular rate and rhythm; No murmurs, rubs, or gallops  ABDOMEN: Soft, Nontender, Nondistended; Bowel sounds present.  No hepatosplenomegaly  EXTREMITIES:  2+ Peripheral Pulses, No clubbing, cyanosis, or edema  LYMPH: No lymphadenopathy noted  SKIN: No rashes or lesions  PSYCH: Alert & Oriented x3  NERVOUS SYSTEM:  ; Motor Strength 5/5 B/L upper and lower extremities.  Sensory intact    Consultant(s) Notes Reviewed:  [x ] YES  [ ] NO  Care Discussed with Consultants/Other Providers [ x] YES  [ ] NO    LABS:                        8.1    8.70  )-----------( 123      ( 29 May 2018 09:20 )             23.7     05-29    139  |  106  |  25<H>  ----------------------------<  136<H>  3.8   |  23  |  1.16    Ca    7.6<L>      29 May 2018 03:50  Phos  1.6     05-29  Mg     1.7     05-29    TPro  4.9<L>  /  Alb  2.3<L>  /  TBili  0.9  /  DBili  x   /  AST  25  /  ALT  18  /  AlkPhos  71  05-29    PT/INR - ( 30 May 2018 05:10 )   PT: 16.9 SEC;   INR: 1.46          PTT - ( 30 May 2018 05:10 )  PTT:27.5 SEC PROVIDER CONTACT INFO:  MD ALISHA Gregg Pager: [phone]  [place] Pager: [phone]    KETAN TIPTON  78y  Male      Patient is a 78y old  Male who presents with a chief complaint of GI bleed (27 May 2018 02:29)      INTERVAL HPI/OVERNIGHT EVENTS: No overnight events. This AM pt c/o muscular pain in left side of neck. He had one BM yesterday, is unsure of how it looked. Denies F/C/N/V, CP/SOB, abd pain, dysuria.     T(C): 37.1 (05-30-18 @ 05:14), Max: 37.1 (05-30-18 @ 05:14)  HR: 60 (05-30-18 @ 05:14) (60 - 90)  BP: 111/63 (05-30-18 @ 05:14) (100/63 - 154/72)  RR: 19 (05-30-18 @ 05:14) (18 - 30)  SpO2: 97% (05-30-18 @ 05:14) (93% - 99%)  Wt(kg): --Vital Signs Last 24 Hrs  T(C): 37.1 (30 May 2018 05:14), Max: 37.1 (30 May 2018 05:14)  T(F): 98.7 (30 May 2018 05:14), Max: 98.7 (30 May 2018 05:14)  HR: 60 (30 May 2018 05:14) (60 - 90)  BP: 111/63 (30 May 2018 05:14) (100/63 - 154/72)  BP(mean): 83 (29 May 2018 21:00) (80 - 115)  RR: 19 (30 May 2018 05:14) (18 - 30)  SpO2: 97% (30 May 2018 05:14) (93% - 99%)    PHYSICAL EXAM:  GENERAL: NAD  HEAD:  Atraumatic, Normocephalic  EYES: EOMI, PERRLA, conjunctiva and sclera clear  ENMT: No tonsillar erythema, exudates,; Moist mucous membranes. No lesions  NECK: Supple, No JVD, Normal thyroid  CHEST/LUNG: Clear to percussion bilaterally; No rales, rhonchi, wheezing, or rubs  HEART: Regular rate and rhythm; No murmurs, rubs, or gallops  ABDOMEN: Obese, soft, nontender, no ascites, no fluid wave appreciated on exam  EXTREMITIES:  2+ Peripheral Pulses, No clubbing, cyanosis, or edema  LYMPH: No lymphadenopathy noted  SKIN: No rashes or lesions  PSYCH: Alert & Oriented x3  NERVOUS SYSTEM:  ; Motor Strength 5/5 B/L upper and lower extremities.  Sensory intact    Consultant(s) Notes Reviewed:  [x ] YES  [ ] NO  Care Discussed with Consultants/Other Providers [ x] YES  [ ] NO    LABS:                        8.1    8.70  )-----------( 123      ( 29 May 2018 09:20 )             23.7     05-29    139  |  106  |  25<H>  ----------------------------<  136<H>  3.8   |  23  |  1.16    Ca    7.6<L>      29 May 2018 03:50  Phos  1.6     05-29  Mg     1.7     05-29    TPro  4.9<L>  /  Alb  2.3<L>  /  TBili  0.9  /  DBili  x   /  AST  25  /  ALT  18  /  AlkPhos  71  05-29    PT/INR - ( 30 May 2018 05:10 )   PT: 16.9 SEC;   INR: 1.46          PTT - ( 30 May 2018 05:10 )  PTT:27.5 SEC

## 2018-05-30 NOTE — PROGRESS NOTE ADULT - PROBLEM SELECTOR PLAN 1
Found to have variceal bleed on emergent EGD, s/p banding x2. Now s/p octreotide gtt, ppt gtt, 5u pRBC, 4u FFP, and 2u platelets. Hgb stable  - cont to check CBC QD  - ppi IV BID  - c/w ceftriaxone through 6/2 (today 2/5)  - f/u mesenteric duplex to r/o ischemia in setting of SMA stenosis  - vasc surgery following, appreciate recs Found to have variceal bleed on emergent EGD, s/p banding x2. Now s/p octreotide gtt, ppt gtt, 5u pRBC, 4u FFP, and 2u platelets. Hgb stable.  - cont to check CBC QD  - ppi IV BID, will switch to PO if hgb stable today  - c/w ceftriaxone through 6/2 (today 3/5)  - f/u mesenteric duplex to r/o ischemia in setting of SMA stenosis  - vasc surgery following, appreciate recs

## 2018-05-30 NOTE — PROGRESS NOTE ADULT - SUBJECTIVE AND OBJECTIVE BOX
Chief Complaint:  Patient is a 78y old  Male who presents with a chief complaint of GI bleed (27 May 2018 02:29)      Interval Events:   Hb and vitals stable overnight. No additional episodes of melena. Patient transferred from ICU to floor.    Allergies:  Allergy Status Unknown  No Known Allergies      Home Medications:    Hospital Medications:  cefTRIAXone   IVPB      cefTRIAXone   IVPB 1 Gram(s) IV Intermittent every 24 hours  lidocaine   Patch 1 Patch Transdermal every other day  melatonin 3 milliGRAM(s) Oral at bedtime  pantoprazole  Injectable 40 milliGRAM(s) IV Push every 12 hours  potassium acid phosphate/sodium acid phosphate tablet (K-PHOS No. 2) 1 Tablet(s) Oral four times a day with meals  propranolol 10 milliGRAM(s) Oral two times a day      PMHX/PSHX:  Liver cirrhosis  Guillain-[place] syndrome  HTN (hypertension)  H/O inguinal hernia repair      Family history:  Family history of ovarian cancer (Sibling)      ROS:     General:  No wt loss, fevers, chills, night sweats, fatigue,   Eyes:  Good vision, no reported pain  ENT:  No sore throat, pain, runny nose, dysphagia  CV:  No pain, palpitations, hypo/hypertension  Resp:  No dyspnea, cough, tachypnea, wheezing  GI:  No pain, No nausea, No vomiting, No diarrhea, No constipation, No weight loss, No fever, No pruritis, No rectal bleeding, No tarry stools, No dysphagia,  :  No pain, bleeding, incontinence, nocturia  Muscle:  No pain, weakness  Neuro:  No weakness, tingling, memory problems  Psych:  No fatigue, insomnia, mood problems, depression  Endocrine:  No polyuria, polydipsia, cold/heat intolerance  Heme:  No petechiae, ecchymosis, easy bruisability  Skin:  No rash, tattoos, scars, edema      PHYSICAL EXAM:   Vital Signs:  Vital Signs Last 24 Hrs  T(C): 37.1 (30 May 2018 09:10), Max: 37.1 (30 May 2018 05:14)  T(F): 98.8 (30 May 2018 09:10), Max: 98.8 (30 May 2018 09:10)  HR: 59 (30 May 2018 09:10) (59 - 90)  BP: 101/58 (30 May 2018 09:10) (100/63 - 151/68)  BP(mean): 83 (29 May 2018 21:00) (80 - 115)  RR: 18 (30 May 2018 09:10) (18 - 30)  SpO2: 100% (30 May 2018 09:10) (97% - 100%)  Daily     Daily Weight in k.2 (30 May 2018 05:14)    GENERAL:  Appears stated age, well-groomed, well-nourished, no distress  HEENT:  NC/AT,  conjunctivae clear and pink, no thyromegaly, nodules, adenopathy, no JVD, sclera -anicteric  CHEST:  Full & symmetric excursion, no increased effort, breath sounds clear  HEART:  Regular rhythm, S1, S2, no murmur/rub/S3/S4, no abdominal bruit, no edema  ABDOMEN:  Soft, non-tender, non-distended, normoactive bowel sounds,  no masses ,no hepato-splenomegaly, no signs of chronic liver disease  EXTEREMITIES:  no cyanosis,clubbing or edema  SKIN:  No rash/erythema/ecchymoses/petechiae/wounds/abscess/warm/dry  NEURO:  Alert, oriented, no asterixis, no tremor, no encephalopathy    LABS:                        8.3    8.95  )-----------( 147      ( 30 May 2018 10:23 )             25.3     05-30    139  |  104  |  19  ----------------------------<  154<H>  4.0   |  23  |  1.14    Ca    7.5<L>      30 May 2018 05:10  Phos  2.3     05-30  Mg     1.7     -30    TPro  4.7<L>  /  Alb  2.1<L>  /  TBili  0.6  /  DBili  x   /  AST  34  /  ALT  21  /  AlkPhos  89  05-30    LIVER FUNCTIONS - ( 30 May 2018 05:10 )  Alb: 2.1 g/dL / Pro: 4.7 g/dL / ALK PHOS: 89 u/L / ALT: 21 u/L / AST: 34 u/L / GGT: x           PT/INR - ( 30 May 2018 05:10 )   PT: 16.9 SEC;   INR: 1.46          PTT - ( 30 May 2018 05:10 )  PTT:27.5 SEC        Imaging: Chief Complaint:  Patient is a 78y old  Male who presents with a chief complaint of GI bleed (27 May 2018 02:29)      Interval Events:   Hb and vitals stable overnight. No additional episodes of melena. Patient transferred from ICU to floor.  Patient feels he is improving everyday. Reports his last BM was yesterday which was still black in color.    Allergies:  Allergy Status Unknown  No Known Allergies      Home Medications:    Hospital Medications:  cefTRIAXone   IVPB      cefTRIAXone   IVPB 1 Gram(s) IV Intermittent every 24 hours  lidocaine   Patch 1 Patch Transdermal every other day  melatonin 3 milliGRAM(s) Oral at bedtime  pantoprazole  Injectable 40 milliGRAM(s) IV Push every 12 hours  potassium acid phosphate/sodium acid phosphate tablet (K-PHOS No. 2) 1 Tablet(s) Oral four times a day with meals  propranolol 10 milliGRAM(s) Oral two times a day      PMHX/PSHX:  Liver cirrhosis  Guillain-[place] syndrome  HTN (hypertension)  H/O inguinal hernia repair      Family history:  Family history of ovarian cancer (Sibling)      ROS:   as above      PHYSICAL EXAM:   Vital Signs:  Vital Signs Last 24 Hrs  T(C): 37.1 (30 May 2018 09:10), Max: 37.1 (30 May 2018 05:14)  T(F): 98.8 (30 May 2018 09:10), Max: 98.8 (30 May 2018 09:10)  HR: 59 (30 May 2018 09:10) (59 - 90)  BP: 101/58 (30 May 2018 09:10) (100/63 - 151/68)  BP(mean): 83 (29 May 2018 21:00) (80 - 115)  RR: 18 (30 May 2018 09:10) (18 - 30)  SpO2: 100% (30 May 2018 09:10) (97% - 100%)  Daily     Daily Weight in k.2 (30 May 2018 05:14)    GENERAL:  NAD  HEENT:  NC/AT,  conjunctivae clear and pink, no thyromegaly, nodules, adenopathy, no JVD, sclera -anicteric  CHEST:  Full & symmetric excursion, no increased effort, breath sounds clear  HEART:  Regular rhythm, S1, S2, no murmur/rub/S3/S4, no abdominal bruit, no edema  ABDOMEN:  Soft, non-tender, non-distended, normoactive bowel sounds,  no masses ,no hepato-splenomegaly, no signs of chronic liver disease  EXTEREMITIES:  no cyanosis,clubbing or edema  SKIN:  No rash/erythema/ecchymoses/petechiae/wounds/abscess/warm/dry  NEURO:  Alert, oriented, no asterixis, no tremor, no encephalopathy    LABS:                        8.3    8.95  )-----------( 147      ( 30 May 2018 10:23 )             25.3         139  |  104  |  19  ----------------------------<  154<H>  4.0   |  23  |  1.14    Ca    7.5<L>      30 May 2018 05:10  Phos  2.3       Mg     1.7         TPro  4.7<L>  /  Alb  2.1<L>  /  TBili  0.6  /  DBili  x   /  AST  34  /  ALT  21  /  AlkPhos  89      LIVER FUNCTIONS - ( 30 May 2018 05:10 )  Alb: 2.1 g/dL / Pro: 4.7 g/dL / ALK PHOS: 89 u/L / ALT: 21 u/L / AST: 34 u/L / GGT: x           PT/INR - ( 30 May 2018 05:10 )   PT: 16.9 SEC;   INR: 1.46          PTT - ( 30 May 2018 05:10 )  PTT:27.5 SEC        Imaging: Chief Complaint:  Patient is a 78y old  Male who presents with a chief complaint of GI bleed (27 May 2018 02:29)      Interval Events:   Hb and vitals stable overnight. No additional episodes of melena. Patient transferred from ICU to floor.  Patient feels he is improving everyday. Reports his last BM was yesterday which was still black in color.    Allergies:  Allergy Status Unknown  No Known Allergies      Home Medications:    Hospital Medications:  cefTRIAXone   IVPB      cefTRIAXone   IVPB 1 Gram(s) IV Intermittent every 24 hours  lidocaine   Patch 1 Patch Transdermal every other day  melatonin 3 milliGRAM(s) Oral at bedtime  pantoprazole  Injectable 40 milliGRAM(s) IV Push every 12 hours  potassium acid phosphate/sodium acid phosphate tablet (K-PHOS No. 2) 1 Tablet(s) Oral four times a day with meals  propranolol 10 milliGRAM(s) Oral two times a day      PMHX/PSHX:  Liver cirrhosis  Guillain-[place] syndrome  HTN (hypertension)  H/O inguinal hernia repair      Family history:  Family history of ovarian cancer (Sibling)      ROS:   as above      PHYSICAL EXAM:   Vital Signs:  Vital Signs Last 24 Hrs  T(C): 37.1 (30 May 2018 09:10), Max: 37.1 (30 May 2018 05:14)  T(F): 98.8 (30 May 2018 09:10), Max: 98.8 (30 May 2018 09:10)  HR: 59 (30 May 2018 09:10) (59 - 90)  BP: 101/58 (30 May 2018 09:10) (100/63 - 151/68)  BP(mean): 83 (29 May 2018 21:00) (80 - 115)  RR: 18 (30 May 2018 09:10) (18 - 30)  SpO2: 100% (30 May 2018 09:10) (97% - 100%)  Daily     Daily Weight in k.2 (30 May 2018 05:14)    GENERAL:  NAD  HEENT:  NC/AT,  conjunctivae clear and pink, no thyromegaly, nodules, adenopathy, no JVD, sclera -anicteric  CHEST:  Full & symmetric excursion, no increased effort, breath sounds clear  HEART:  Regular rhythm, S1, S2, no murmur/rub/S3/S4, no abdominal bruit, no edema  ABDOMEN:  Soft, non-tender, non-distended, normoactive bowel sounds,  no masses ,no hepato-splenomegaly, no signs of chronic liver disease  EXTEREMITIES:  no cyanosis,clubbing or edema  SKIN:  No rash/erythema/ecchymoses/petechiae/wounds/abscess/warm/dry  NEURO:  Alert, oriented, no asterixis, no tremor, no encephalopathy    LABS:                        8.3    8.95  )-----------( 147      ( 30 May 2018 10:23 )             25.3         139  |  104  |  19  ----------------------------<  154<H>  4.0   |  23  |  1.14    Ca    7.5<L>      30 May 2018 05:10  Phos  2.3       Mg     1.7         TPro  4.7<L>  /  Alb  2.1<L>  /  TBili  0.6  /  DBili  x   /  AST  34  /  ALT  21  /  AlkPhos  89      LIVER FUNCTIONS - ( 30 May 2018 05:10 )  Alb: 2.1 g/dL / Pro: 4.7 g/dL / ALK PHOS: 89 u/L / ALT: 21 u/L / AST: 34 u/L / GGT: x           PT/INR - ( 30 May 2018 05:10 )   PT: 16.9 SEC;   INR: 1.46          PTT - ( 30 May 2018 05:10 )  PTT:27.5 SEC        Imaging:    CT abdomen : cirrhosis, no ascites, porcellain GB, stone in CBD/PD - f/u with MRCP if warranted, R lower pole renal lesion - f/u with MRI,

## 2018-05-30 NOTE — PHYSICAL THERAPY INITIAL EVALUATION ADULT - ADDITIONAL COMMENTS
Pt. reports owning DME of straight cane, standard walker.     Pt. was left supine in bed post PT Evaluation, NAD, call bell within reach, wife present. RN present and aware of pt. status.

## 2018-05-30 NOTE — PROGRESS NOTE ADULT - ASSESSMENT
78M with PMHx of EtOH abuse, alcoholic cirrhosis, HTN, distant hx of Guillan [place], chronic back pain who presented with acute blood loss anemia secondary to variceal bleed now s/p protonix and octreotide gtt

## 2018-05-30 NOTE — PROGRESS NOTE ADULT - ASSESSMENT
Impression:  79yo M with EtOH abuse, alcoholic cirrhosis, HTN, distant hx Guillain-[place], chronic back pain, admitted to [facility] for lower abd pain and chest pain, transferred to [facility] for GIB.    Problem List:  1) Acute variceal bleed now s/p EBL of esophageal varices   2) Acute blood loss anemia - 2/2 above  3) ETOH Cirrhosis, previously on transplant list but no longer - MELD-Na 15       - varices: present s/p EBL 5/27       - ascites: none on CT       - SPE: none, on lactulose at home       - HCC: unknown        - coagulopathy - INR elevated, thrombocytopenia       - etiology - likely ETOH but will perform further serologic eval when stable  4) Porcelain GB   5) Possible PD or CBD stone without ductal dilatation  6) SMA stenosis     Plan:  - continue to trend CBC q 8 hours, transfuse if Hgb <7  - monitor for further overt GI bleeding today  - NPO  - octreotide gtt, protonix gtt x 3 days   - Zosyn x 5 days  - when stable, can get MRI/MRCP for further evaluation of biliary tree, gallbladder, and liver (to eval for HCC)  - follouwup: Hep B surface Ab, KERRI, ASMA, total IgG, AMA, ceruloplasmin, alpha 1 antitrypsin  - follouwup vascular recs for eval of mesenteric ischemia given SMA stenosis Impression:  77yo M with EtOH abuse, alcoholic cirrhosis, HTN, distant hx Guillain-[place], chronic back pain, admitted to [facility] for lower abd pain and chest pain, transferred to [facility] for GIB.    Problem List:  1) Acute variceal bleed now s/p EBL of esophageal varices   2) Acute blood loss anemia - 2/2 above  3) ETOH Cirrhosis, previously on transplant list but no longer - MELD-Na 15       - varices: present s/p EBL 5/27       - ascites: none on CT       - SPE: none, on lactulose at home       - HCC: unknown        - coagulopathy - INR elevated, thrombocytopenia       - etiology - likely ETOH but will perform further serologic eval when stable  4) Porcelain GB   5) Possible PD or CBD stone without ductal dilatation  6) SMA stenosis     Plan:  - continue to trend CBC q 8 hours, transfuse if Hgb <7  - monitor for further overt GI bleeding today  - can advance diet to clears today as tolerated  - octreotide gtt x 5 days total, protonix gtt x 3 days total, then can transition to PPI PO daily  - Zosyn x 5 days total  - when stable, can get MRI/MRCP for further evaluation of biliary tree, gallbladder, and liver (to eval for HCC)  - followup: Hep B surface Ab, KERRI, ASMA, total IgG, AMA, ceruloplasmin, alpha 1 antitrypsin  - followup vascular recs for eval of mesenteric ischemia given SMA stenosis Impression:  79yo M with EtOH abuse, alcoholic cirrhosis, HTN, distant hx Guillain-[place], chronic back pain, admitted to [facility] for lower abd pain and chest pain, transferred to [facility] for GIB.    Problem List:  1) Acute variceal bleed now s/p EBL of esophageal varices   2) Acute blood loss anemia - 2/2 above  3) ETOH Cirrhosis, previously on transplant list but no longer - MELD-Na 15       - varices: present s/p EBL 5/27       - ascites: none on CT       - SPE: none, on lactulose at home       - HCC: none on CT 5/25       - coagulopathy - INR elevated, thrombocytopenia       - etiology - likely ETOH but will perform further serologic eval  4) Porcelain GB   5) Possible PD or CBD stone without ductal dilatation; no abdominal pain  6) SMA stenosis   - right renal lesion    Plan:  - continue to trend CBC closely, transfuse if Hgb <7  - monitor for further overt GI bleeding today  - can advance diet  - octreotide gtt x 5 days total, protonix gtt x 3 days total, then can transition to PPI PO daily  - propranolol 10 mg bid, increase to 20 mg bid, if tolerates  - Zosyn x 5 days total  - no indication for MRCP or ERCP in absence of abdominal pain  - followup: Hep B surface Ab, KERRI, ASMA, total IgG, AMA, alpha 1 antitrypsin  - followup vascular recs for eval of mesenteric ischemia given SMA stenosis

## 2018-05-31 LAB
ALBUMIN SERPL ELPH-MCNC: 2.3 G/DL — LOW (ref 3.3–5)
ALP SERPL-CCNC: 95 U/L — SIGNIFICANT CHANGE UP (ref 40–120)
ALT FLD-CCNC: 21 U/L — SIGNIFICANT CHANGE UP (ref 4–41)
APTT BLD: 27.3 SEC — LOW (ref 27.5–37.4)
AST SERPL-CCNC: 25 U/L — SIGNIFICANT CHANGE UP (ref 4–40)
BILIRUB SERPL-MCNC: 0.6 MG/DL — SIGNIFICANT CHANGE UP (ref 0.2–1.2)
BLD GP AB SCN SERPL QL: NEGATIVE — SIGNIFICANT CHANGE UP
BUN SERPL-MCNC: 19 MG/DL — SIGNIFICANT CHANGE UP (ref 7–23)
BUN SERPL-MCNC: 19 MG/DL — SIGNIFICANT CHANGE UP (ref 7–23)
CALCIUM SERPL-MCNC: 7.6 MG/DL — LOW (ref 8.4–10.5)
CALCIUM SERPL-MCNC: 7.6 MG/DL — LOW (ref 8.4–10.5)
CHLORIDE SERPL-SCNC: 102 MMOL/L — SIGNIFICANT CHANGE UP (ref 98–107)
CHLORIDE SERPL-SCNC: 102 MMOL/L — SIGNIFICANT CHANGE UP (ref 98–107)
CO2 SERPL-SCNC: 25 MMOL/L — SIGNIFICANT CHANGE UP (ref 22–31)
CO2 SERPL-SCNC: 25 MMOL/L — SIGNIFICANT CHANGE UP (ref 22–31)
CREAT SERPL-MCNC: 1.09 MG/DL — SIGNIFICANT CHANGE UP (ref 0.5–1.3)
CREAT SERPL-MCNC: 1.09 MG/DL — SIGNIFICANT CHANGE UP (ref 0.5–1.3)
GLUCOSE SERPL-MCNC: 119 MG/DL — HIGH (ref 70–99)
GLUCOSE SERPL-MCNC: 119 MG/DL — HIGH (ref 70–99)
HBV SURFACE AG SER-ACNC: NEGATIVE — SIGNIFICANT CHANGE UP
HCT VFR BLD CALC: 26 % — LOW (ref 39–50)
HGB BLD-MCNC: 8.5 G/DL — LOW (ref 13–17)
IGG FLD-MCNC: 946 MG/DL — SIGNIFICANT CHANGE UP (ref 700–1600)
INR BLD: 1.48 — HIGH (ref 0.88–1.17)
MAGNESIUM SERPL-MCNC: 1.7 MG/DL — SIGNIFICANT CHANGE UP (ref 1.6–2.6)
MCHC RBC-ENTMCNC: 29.2 PG — SIGNIFICANT CHANGE UP (ref 27–34)
MCHC RBC-ENTMCNC: 32.7 % — SIGNIFICANT CHANGE UP (ref 32–36)
MCV RBC AUTO: 89.3 FL — SIGNIFICANT CHANGE UP (ref 80–100)
NRBC # FLD: 0 — SIGNIFICANT CHANGE UP
PHOSPHATE SERPL-MCNC: 2.4 MG/DL — LOW (ref 2.5–4.5)
PLATELET # BLD AUTO: 155 K/UL — SIGNIFICANT CHANGE UP (ref 150–400)
PMV BLD: 10.6 FL — SIGNIFICANT CHANGE UP (ref 7–13)
POTASSIUM SERPL-MCNC: 4 MMOL/L — SIGNIFICANT CHANGE UP (ref 3.5–5.3)
POTASSIUM SERPL-MCNC: 4 MMOL/L — SIGNIFICANT CHANGE UP (ref 3.5–5.3)
POTASSIUM SERPL-SCNC: 4 MMOL/L — SIGNIFICANT CHANGE UP (ref 3.5–5.3)
POTASSIUM SERPL-SCNC: 4 MMOL/L — SIGNIFICANT CHANGE UP (ref 3.5–5.3)
PROT SERPL-MCNC: 5.4 G/DL — LOW (ref 6–8.3)
PROTHROM AB SERPL-ACNC: 16.6 SEC — HIGH (ref 9.8–13.1)
RBC # BLD: 2.91 M/UL — LOW (ref 4.2–5.8)
RBC # FLD: 16.7 % — HIGH (ref 10.3–14.5)
RH IG SCN BLD-IMP: POSITIVE — SIGNIFICANT CHANGE UP
SODIUM SERPL-SCNC: 137 MMOL/L — SIGNIFICANT CHANGE UP (ref 135–145)
SODIUM SERPL-SCNC: 137 MMOL/L — SIGNIFICANT CHANGE UP (ref 135–145)
WBC # BLD: 8.09 K/UL — SIGNIFICANT CHANGE UP (ref 3.8–10.5)
WBC # FLD AUTO: 8.09 K/UL — SIGNIFICANT CHANGE UP (ref 3.8–10.5)

## 2018-05-31 PROCEDURE — 99233 SBSQ HOSP IP/OBS HIGH 50: CPT

## 2018-05-31 PROCEDURE — 99232 SBSQ HOSP IP/OBS MODERATE 35: CPT | Mod: GC

## 2018-05-31 RX ORDER — PROPRANOLOL HCL 160 MG
20 CAPSULE, EXTENDED RELEASE 24HR ORAL
Qty: 0 | Refills: 0 | Status: DISCONTINUED | OUTPATIENT
Start: 2018-05-31 | End: 2018-06-07

## 2018-05-31 RX ADMIN — PANTOPRAZOLE SODIUM 40 MILLIGRAM(S): 20 TABLET, DELAYED RELEASE ORAL at 18:12

## 2018-05-31 RX ADMIN — Medication 10 MILLIGRAM(S): at 06:03

## 2018-05-31 RX ADMIN — CEFTRIAXONE 100 GRAM(S): 500 INJECTION, POWDER, FOR SOLUTION INTRAMUSCULAR; INTRAVENOUS at 09:07

## 2018-05-31 RX ADMIN — PANTOPRAZOLE SODIUM 40 MILLIGRAM(S): 20 TABLET, DELAYED RELEASE ORAL at 06:03

## 2018-05-31 RX ADMIN — Medication 20 MILLIGRAM(S): at 18:12

## 2018-05-31 RX ADMIN — LIDOCAINE 1 PATCH: 4 CREAM TOPICAL at 00:09

## 2018-05-31 RX ADMIN — OCTREOTIDE ACETATE 10 MICROGRAM(S)/HR: 200 INJECTION, SOLUTION INTRAVENOUS; SUBCUTANEOUS at 18:13

## 2018-05-31 RX ADMIN — Medication 1 TABLET(S): at 09:07

## 2018-05-31 RX ADMIN — Medication 3 MILLIGRAM(S): at 21:46

## 2018-05-31 NOTE — PROGRESS NOTE ADULT - SUBJECTIVE AND OBJECTIVE BOX
Patient seen and examined at bedside. No acute events. He feels well. He states he is feeling much better.    T(C): 36.4 (05-31-18 @ 10:44), Max: 37.3 (05-30-18 @ 18:09)  HR: 59 (05-31-18 @ 10:44) (59 - 66)  BP: 128/70 (05-31-18 @ 10:44) (105/57 - 136/75)  RR: 18 (05-31-18 @ 10:44) (18 - 18)  SpO2: 100% (05-31-18 @ 10:44) (99% - 100%)  Wt(kg): --  05-30 @ 07:01  -  05-31 @ 07:00  --------------------------------------------------------  IN:    IV PiggyBack: 50 mL    octreotide  Infusion: 30 mL    Oral Fluid: 380 mL  Total IN: 460 mL    OUT:    Voided: 650 mL  Total OUT: 650 mL    Total NET: -190 mL      05-31 @ 07:01  -  05-31 @ 12:44  --------------------------------------------------------  IN:    IV PiggyBack: 50 mL    octreotide  Infusion: 30 mL  Total IN: 80 mL    OUT:  Total OUT: 0 mL    Total NET: 80 mL  Gen: NAD  Lungs: Non-labored breathing  Heart: S1, S2  Abdomen: Soft, ND, minimal tenderness throughout.  Vascular: Palpable femoral pulses bilaterally  LABS:                        8.5    8.09  )-----------( 155      ( 31 May 2018 06:50 )             26.0     31 May 2018 06:50    137    |  102    |  19     ----------------------------<  119    4.0     |  25     |  1.09     Ca    7.6        31 May 2018 06:50  Phos  2.4       31 May 2018 06:50  Mg     1.7       31 May 2018 06:50    TPro  5.4    /  Alb  2.3    /  TBili  0.6    /  DBili  x      /  AST  25     /  ALT  21     /  AlkPhos  95     31 May 2018 06:50    PT/INR - ( 31 May 2018 06:50 )   PT: 16.6 SEC;   INR: 1.48          PTT - ( 31 May 2018 06:50 )  PTT:27.3 SEC      Patient name: KETAN TIPTON  Date of test: 5/30/2018  MR#: [id number]  [facility] #: [id number]    Location: [facility] Physician(s): , Kaelyn Vazquez MD  Interpreted by: Grayson Cadena M.D. [facility]  Tech: Nieves Tillman [facility]  Type of Test: Abdominal Aorta  ------------------------------------------------------------------------  Procedure: Real-time grayscale and color Duplex  ultrasonography was used to interrogate the abdominal  arterial system including the bilateral common iliac  arteries.  Indications: Generalized abdominal pain (R10.84)  ------------------------------------------------------------------------  MESENTERIC VESSELS:  ------------------------------------------------------------------------  PSV:     Aorta: N/A cm/sec     Celiac Artery: 269.0 cm/sec     Superior messenteric artery: 190.0 cm/sec  ------------------------------------------------------------------------  EDV:      Aorta: N/A cm/sec      Celiac Artery: 43.0 cm/sec      Superior messenteric artery: 14.0 cm/sec  ------------------------------------------------------------------------  RENAL ARTERY:  ------------------------------------------------------------------------  RIGHT:  ------------------------------------------------------------------------   PSV:     Proximal Vessel: N/A cm/sec     Mid. Vessel: N/A cm/sec     Dist. Vessel: N/A cm/sec  ------------------------------------------------------------------------   EDV:      Proximal Vessel: N/A cm/sec      Mid. Vessel: N/A cm/sec      Dist. Vessel: N/A cm/sec  ------------------------------------------------------------------------   RAR: N/A  ------------------------------------------------------------------------  LEFT:  ------------------------------------------------------------------------   PSV:     Proximal Vessel: N/A cm/sec     Mid. Vessel: N/A cm/sec     Dist. Vessel: N/A cm/sec  ------------------------------------------------------------------------   EDV:      Proximal Vessel: N/A cm/sec      Mid. Vessel: N/A cm/sec      Dist. Vessel: N/A cm/sec  ------------------------------------------------------------------------   RAR: N/A  ------------------------------------------------------------------------  KIDNEY:  ------------------------------------------------------------------------  RIGHT:  ------------------------------------------------------------------------    PSV: N/A cm/sec    EDV: N/A cm/sec    RI: N/A    Length: N/A cm  ------------------------------------------------------------------------  LEFT:  ------------------------------------------------------------------------    PSV: N/A cm/sec    EDV: N/A cm/sec    RI: N/A    Length: N/A cm  ------------------------------------------------------------------------  Right Findings: Study limited by excessive bowel gas and  body habitus.  Proximal segments of the celiac and superior mesenteric  arteries appear patent, with normal velocities and  spectral Doppler waveforms. The inferior mesenteric artery  was not visualized.  Celiac artery PSV (proxima segmentl): 269 cm/sec  Superior mesenteric artery PSV (proximal segment): 119  cm/sec  ------------------------------------------------------------------------  Summary/Impressions:  Study limited by excessive bowel gas and body habitus.  Proximal segmentsof the celiac and superior mesenteric  arteries appear patent.  ------------------------------------------------------------------------  Confirmed on  5/30/2018 - 12:49 PM by JACQUI Abad  By signing this report, the attending physician certifies  that he or she has personally supervised and interpreted  the vascular study and has reviewed and or edited and  agrees with the written comments contained within the  report.

## 2018-05-31 NOTE — PROGRESS NOTE ADULT - ASSESSMENT
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    Problem List:  1) Acute variceal bleed now s/p EBL of esophageal varices   2) Acute blood loss anemia - 2/2 above  3) ETOH Cirrhosis, previously on transplant list but no longer - MELD-Na 15       - varices: present s/p EBL 5/27       - ascites: none on CT       - SPE: none, on lactulose at home       - HCC: none on CT 5/25       - coagulopathy - INR elevated, thrombocytopenia       - etiology - likely ETOH but will perform further serologic eval  4) Porcelain GB   5) Possible PD or CBD stone without ductal dilatation; no abdominal pain  6) SMA stenosis   - right renal lesion    Plan:  - trend CBC  - diet as tolerated  - PPI PO BID  - propranolol 10 mg bid, increase to 20 mg bid, if tolerates  - Zosyn x 5 days total  - no indication for MRCP or ERCP in absence of abdominal pain; outpatient consideration for cholecystectomy and evaluation of renal lesion may be warranted.  However, note that patient's [place] Post-Op Mortality risk is 16% 30 day, 25% 90 day given degree of cirrhosis  - followup serologic studies, can be done as outpatient  - patient needs to followup with us in office : [phone].  He needs repeat endoscopy in 3-4 weeks to eval varices. 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    Problem List:  1) Acute variceal bleed now s/p EBL of esophageal varices   2) Acute blood loss anemia - 2/2 above  3) ETOH Cirrhosis, previously on transplant list but no longer - MELD-Na 15       - varices: present s/p EBL 5/27       - ascites: none on CT       - SPE: none, on lactulose at home       - HCC: none on CT 5/25       - coagulopathy - INR elevated, thrombocytopenia       - etiology - likely ETOH but will perform further serologic eval  4) Porcelain GB   5) Possible PD or CBD stone without ductal dilatation; no abdominal pain  6) SMA stenosis   - right renal lesion    Plan:  - trend CBC  - diet as tolerated  - PPI PO BID  - propranolol 10 mg bid, increase to 20 mg bid, if tolerates  - Zosyn x 5 days total  - no current indication for MRCP or ERCP in absence of abdominal pain; outpatient consideration for cholecystectomy and evaluation of renal lesion may be warranted.  However, note that patient's [place] Post-Op Mortality risk is elevated due to cirrhosis - the [facility] risk calculator estimates a mortality of 16% at 30 days, 25% at 90 days - followup serologic studies, can be done as outpatient; follow-up as outpatient  - patient needs to followup with us in office : [phone].  He needs repeat endoscopy in 3-4 weeks to eval varices.

## 2018-05-31 NOTE — PROGRESS NOTE ADULT - PROBLEM SELECTOR PLAN 2
Has hx of EtOH abuse. Viral hepatitis panel negative,   - tx of UGIB as above  - per GI, MRCP indicated if pt has abd pain, which he currently denies  - f/u hepatitis panel  - holding home lactulose Has hx of EtOH abuse. Viral hepatitis panel negative,   - tx of UGIB as above  - per GI, MRCP indicated if pt has abd pain, which he currently denies. Tender in epigastric area on exam  - f/u hepatitis panel  - holding home lactulose

## 2018-05-31 NOTE — PROGRESS NOTE ADULT - PROBLEM SELECTOR PLAN 3
Serum Cr on admission was 1.66, most likely prerenal in the setting of hypovolemia 2/2 UGIB; now wnl  - renally dose all meds  - avoid nephrotoxins

## 2018-05-31 NOTE — PROGRESS NOTE ADULT - ASSESSMENT
78M with PMHx of EtOH abuse, alcoholic cirrhosis, HTN, distant hx of Guillan [place], chronic back pain who presented with acute blood loss anemia secondary to variceal bleed now s/p protonix and octreotide gtt

## 2018-05-31 NOTE — PROGRESS NOTE ADULT - SUBJECTIVE AND OBJECTIVE BOX
PROVIDER CONTACT INFO:  MD ALISHA Gregg Pager: [phone]  Long Range Pager: [phone]    KETAN TIPTON  78y  Male      Patient is a 78y old  Male who presents with a chief complaint of GI bleed (27 May 2018 02:29)      INTERVAL HPI/OVERNIGHT EVENTS: No overnight events.     T(C): 36.9 (05-31-18 @ 05:52), Max: 37.3 (05-30-18 @ 18:09)  HR: 64 (05-31-18 @ 05:52) (59 - 66)  BP: 123/55 (05-31-18 @ 05:52) (101/58 - 136/75)  RR: 18 (05-31-18 @ 05:52) (18 - 18)  SpO2: 99% (05-31-18 @ 05:52) (99% - 100%)  Wt(kg): --Vital Signs Last 24 Hrs  T(C): 36.9 (31 May 2018 05:52), Max: 37.3 (30 May 2018 18:09)  T(F): 98.5 (31 May 2018 05:52), Max: 99.2 (31 May 2018 01:54)  HR: 64 (31 May 2018 05:52) (59 - 66)  BP: 123/55 (31 May 2018 05:52) (101/58 - 136/75)  BP(mean): --  RR: 18 (31 May 2018 05:52) (18 - 18)  SpO2: 99% (31 May 2018 05:52) (99% - 100%)    PHYSICAL EXAM:  GENERAL: NAD, well-groomed, well-developed  HEAD:  Atraumatic, Normocephalic  EYES: EOMI, PERRLA, conjunctiva and sclera clear  ENMT: No tonsillar erythema, exudates,; Moist mucous membranes. No lesions  NECK: Supple, No JVD, Normal thyroid  CHEST/LUNG: Clear to percussion bilaterally; No rales, rhonchi, wheezing, or rubs  HEART: Regular rate and rhythm; No murmurs, rubs, or gallops  ABDOMEN: Obese, Soft, Nontender, Nondistended; Bowel sounds present  EXTREMITIES:  2+ Peripheral Pulses, No clubbing, cyanosis, or edema  LYMPH: No lymphadenopathy noted  SKIN: No rashes or lesions  PSYCH: Alert & Oriented x3    Consultant(s) Notes Reviewed:  [x ] YES  [ ] NO  Care Discussed with Consultants/Other Providers [ x] YES  [ ] NO    LABS:                        8.3    8.95  )-----------( 147      ( 30 May 2018 10:23 )             25.3     05-30    139  |  104  |  19  ----------------------------<  154<H>  4.0   |  23  |  1.14    Ca    7.5<L>      30 May 2018 05:10  Phos  2.3     05-30  Mg     1.7     05-30    TPro  4.7<L>  /  Alb  2.1<L>  /  TBili  0.6  /  DBili  x   /  AST  34  /  ALT  21  /  AlkPhos  89  05-30    PT/INR - ( 31 May 2018 06:50 )   PT: 16.6 SEC;   INR: 1.48          PTT - ( 31 May 2018 06:50 )  PTT:27.3 SEC PROVIDER CONTACT INFO:  MD ALISHA Gregg Pager: [phone]  Long Range Pager: [phone]    KETAN TIPTON  78y  Male      Patient is a 78y old  Male who presents with a chief complaint of GI bleed (27 May 2018 02:29)      INTERVAL HPI/OVERNIGHT EVENTS: No overnight events. This AM pt c/o soreness in right neck and a "little bit of" shortness of breath, but denies F/C/N/V, CP, abd pain, dysuria, constipation/diarrhea. Had BM yesterday with no blood.     T(C): 36.9 (05-31-18 @ 05:52), Max: 37.3 (05-30-18 @ 18:09)  HR: 64 (05-31-18 @ 05:52) (59 - 66)  BP: 123/55 (05-31-18 @ 05:52) (101/58 - 136/75)  RR: 18 (05-31-18 @ 05:52) (18 - 18)  SpO2: 99% (05-31-18 @ 05:52) (99% - 100%)  Wt(kg): --Vital Signs Last 24 Hrs  T(C): 36.9 (31 May 2018 05:52), Max: 37.3 (30 May 2018 18:09)  T(F): 98.5 (31 May 2018 05:52), Max: 99.2 (31 May 2018 01:54)  HR: 64 (31 May 2018 05:52) (59 - 66)  BP: 123/55 (31 May 2018 05:52) (101/58 - 136/75)  BP(mean): --  RR: 18 (31 May 2018 05:52) (18 - 18)  SpO2: 99% (31 May 2018 05:52) (99% - 100%)    PHYSICAL EXAM:  GENERAL: NAD, well-groomed, well-developed  HEAD:  Atraumatic, Normocephalic  EYES: EOMI, PERRLA, conjunctiva and sclera clear  ENMT: No tonsillar erythema, exudates,; Moist mucous membranes. No lesions  NECK: Supple, No JVD, Normal thyroid  CHEST/LUNG: Clear to percussion bilaterally; No rales, rhonchi, wheezing, or rubs  HEART: Regular rate and rhythm; No murmurs, rubs, or gallops  ABDOMEN: Obese, Soft, ttp in epigastric region without rebound or guarding, Nondistended; Bowel sounds present  EXTREMITIES:  2+ Peripheral Pulses, No clubbing, cyanosis, or edema  LYMPH: No lymphadenopathy noted  SKIN: No rashes or lesions  PSYCH: Alert & Oriented x3    Consultant(s) Notes Reviewed:  [x ] YES  [ ] NO  Care Discussed with Consultants/Other Providers [ x] YES  [ ] NO    LABS:                        8.3    8.95  )-----------( 147      ( 30 May 2018 10:23 )             25.3     05-30    139  |  104  |  19  ----------------------------<  154<H>  4.0   |  23  |  1.14    Ca    7.5<L>      30 May 2018 05:10  Phos  2.3     05-30  Mg     1.7     05-30    TPro  4.7<L>  /  Alb  2.1<L>  /  TBili  0.6  /  DBili  x   /  AST  34  /  ALT  21  /  AlkPhos  89  05-30    PT/INR - ( 31 May 2018 06:50 )   PT: 16.6 SEC;   INR: 1.48          PTT - ( 31 May 2018 06:50 )  PTT:27.3 SEC

## 2018-05-31 NOTE — PROGRESS NOTE ADULT - SUBJECTIVE AND OBJECTIVE BOX
HEPATOLOGY PROGRESS NOTE      Chief Complaint:  Patient is a 78y old  Male who presents with a chief complaint of GI bleed (27 May 2018 02:29)      Interval Events: No events overnight.  Tolerating diet.  Hgb stable.     Allergies:  Allergy Status Unknown  No Known Allergies      Hospital Medications:  cefTRIAXone   IVPB      cefTRIAXone   IVPB 1 Gram(s) IV Intermittent every 24 hours  lidocaine   Patch 1 Patch Transdermal every other day  melatonin 3 milliGRAM(s) Oral at bedtime  octreotide  Infusion 50 MICROgram(s)/Hr IV Continuous <Continuous>  pantoprazole    Tablet 40 milliGRAM(s) Oral two times a day  propranolol 10 milliGRAM(s) Oral two times a day      PMHX/PSHX:  Liver cirrhosis  Guillain-[place] syndrome  HTN (hypertension)  H/O inguinal hernia repair      Family history:  Family history of ovarian cancer (Sibling)      ROS:     General:  No wt loss, fevers, chills, night sweats, fatigue,   Eyes:  Good vision, no reported pain  ENT:  No sore throat, pain, runny nose, dysphagia  CV:  No pain, palpitations, hypo/hypertension  Resp:  No dyspnea, cough, tachypnea, wheezing  GI:  See HPI  :  No pain, bleeding, incontinence, nocturia  Muscle:  No pain, weakness  Neuro:  No weakness, tingling, memory problems  Psych:  No fatigue, insomnia, mood problems, depression  Endocrine:  No polyuria, polydipsia, cold/heat intolerance  Heme:  No petechiae, ecchymosis, easy bruisability  Skin:  No rash, edema      PHYSICAL EXAM:     GENERAL:  Appears stated age, well-groomed, well-nourished, no distress  HEENT:  NC/AT,  conjunctivae clear, sclera -anicteric  CHEST:  Full & symmetric excursion, no increased effort, breath sounds clear  HEART:  Regular rhythm, S1, S2, no murmur/rub/S3/S4,  no edema  ABDOMEN:  Soft, non-tender, non-distended, normoactive bowel sounds,  no masses ,no hepato-splenomegaly,   EXTREMITIES:  no cyanosis,clubbing or edema  SKIN:  No rash/erythema/ecchymoses/petechiae/wounds/abscess/warm/dry  NEURO:  Alert, oriented    Vital Signs:  Vital Signs Last 24 Hrs  T(C): 36.9 (31 May 2018 05:52), Max: 37.3 (30 May 2018 18:09)  T(F): 98.5 (31 May 2018 05:52), Max: 99.2 (31 May 2018 01:54)  HR: 64 (31 May 2018 05:52) (59 - 66)  BP: 123/55 (31 May 2018 05:52) (105/57 - 136/75)  BP(mean): --  RR: 18 (31 May 2018 05:52) (18 - 18)  SpO2: 99% (31 May 2018 05:52) (99% - 100%)  Daily     Daily     LABS:                        8.5    8.09  )-----------( 155      ( 31 May 2018 06:50 )             26.0     05-31    137  |  102  |  19  ----------------------------<  119<H>  4.0   |  25  |  1.09    Ca    7.6<L>      31 May 2018 06:50  Phos  2.4     05-31  Mg     1.7     05-31    TPro  5.4<L>  /  Alb  2.3<L>  /  TBili  0.6  /  DBili  x   /  AST  25  /  ALT  21  /  AlkPhos  95  05-31    LIVER FUNCTIONS - ( 31 May 2018 06:50 )  Alb: 2.3 g/dL / Pro: 5.4 g/dL / ALK PHOS: 95 u/L / ALT: 21 u/L / AST: 25 u/L / GGT: x           PT/INR - ( 31 May 2018 06:50 )   PT: 16.6 SEC;   INR: 1.48          PTT - ( 31 May 2018 06:50 )  PTT:27.3 SEC        Imaging:  reviewed

## 2018-05-31 NOTE — PROGRESS NOTE ADULT - ASSESSMENT
78 M with alcoholic cirrhosis presents with GI bleed. Also noted to have a SMA stenosis. Celiac artery and MERVIN are patent. It is unclear if the patient's lower abdominal pain is related at this time.    -recommend Repeat imaging after current issues have resolved, likely on an outpatient   - Follow up with Dr. Ca within 2 weeks of discharge. You may call [phone] to make an appointment.    d/w Dr. Lanny Hatfield, PGY2  # [phone]

## 2018-05-31 NOTE — PROGRESS NOTE ADULT - PROBLEM SELECTOR PLAN 1
Found to have variceal bleed on emergent EGD, s/p banding x2. Now s/p octreotide gtt, ppt gtt, 5u pRBC, 4u FFP, and 2u platelets. Hgb stable.  - cont to check CBC QD  - ppi PO BID  - c/w ceftriaxone through 6/1 (today 4/5)  - f/u mesenteric duplex to r/o ischemia in setting of SMA stenosis  - vasc surgery following, appreciate recs Found to have variceal bleed on emergent EGD, s/p banding x2. Now s/p octreotide gtt, ppt gtt, 5u pRBC, 4u FFP, and 2u platelets. Hgb stable. Abd doppler u/s was limited due to body habitus and bowel gas, but proximal celiac artery and SMA appeared patent.  - cont to check CBC QD  - ppi PO BID  - c/w ceftriaxone through 6/1 (today 4/5)  - vasc surgery following, appreciate recs

## 2018-06-01 LAB
A1AT SERPL-MCNC: 188 MG/DL — SIGNIFICANT CHANGE UP (ref 90–200)
BUN SERPL-MCNC: 18 MG/DL — SIGNIFICANT CHANGE UP (ref 7–23)
CALCIUM SERPL-MCNC: 7.9 MG/DL — LOW (ref 8.4–10.5)
CERULOPLASMIN SERPL-MCNC: 32 MG/DL — SIGNIFICANT CHANGE UP (ref 20–60)
CHLORIDE SERPL-SCNC: 103 MMOL/L — SIGNIFICANT CHANGE UP (ref 98–107)
CO2 SERPL-SCNC: 24 MMOL/L — SIGNIFICANT CHANGE UP (ref 22–31)
CREAT SERPL-MCNC: 1.07 MG/DL — SIGNIFICANT CHANGE UP (ref 0.5–1.3)
CULTURE RESULTS: SIGNIFICANT CHANGE UP
CULTURE RESULTS: SIGNIFICANT CHANGE UP
GLUCOSE SERPL-MCNC: 122 MG/DL — HIGH (ref 70–99)
HCT VFR BLD CALC: 29.4 % — LOW (ref 39–50)
HGB BLD-MCNC: 9.5 G/DL — LOW (ref 13–17)
MAGNESIUM SERPL-MCNC: 1.7 MG/DL — SIGNIFICANT CHANGE UP (ref 1.6–2.6)
MCHC RBC-ENTMCNC: 29.2 PG — SIGNIFICANT CHANGE UP (ref 27–34)
MCHC RBC-ENTMCNC: 32.3 % — SIGNIFICANT CHANGE UP (ref 32–36)
MCV RBC AUTO: 90.5 FL — SIGNIFICANT CHANGE UP (ref 80–100)
NRBC # FLD: 0 — SIGNIFICANT CHANGE UP
PHOSPHATE SERPL-MCNC: 2.4 MG/DL — LOW (ref 2.5–4.5)
PLATELET # BLD AUTO: 189 K/UL — SIGNIFICANT CHANGE UP (ref 150–400)
PMV BLD: 10.4 FL — SIGNIFICANT CHANGE UP (ref 7–13)
POTASSIUM SERPL-MCNC: 4 MMOL/L — SIGNIFICANT CHANGE UP (ref 3.5–5.3)
POTASSIUM SERPL-SCNC: 4 MMOL/L — SIGNIFICANT CHANGE UP (ref 3.5–5.3)
RBC # BLD: 3.25 M/UL — LOW (ref 4.2–5.8)
RBC # FLD: 16.7 % — HIGH (ref 10.3–14.5)
SODIUM SERPL-SCNC: 137 MMOL/L — SIGNIFICANT CHANGE UP (ref 135–145)
SPECIMEN SOURCE: SIGNIFICANT CHANGE UP
SPECIMEN SOURCE: SIGNIFICANT CHANGE UP
WBC # BLD: 9.02 K/UL — SIGNIFICANT CHANGE UP (ref 3.8–10.5)
WBC # FLD AUTO: 9.02 K/UL — SIGNIFICANT CHANGE UP (ref 3.8–10.5)

## 2018-06-01 PROCEDURE — 74183 MRI ABD W/O CNTR FLWD CNTR: CPT | Mod: 26

## 2018-06-01 PROCEDURE — 99232 SBSQ HOSP IP/OBS MODERATE 35: CPT | Mod: GC

## 2018-06-01 RX ORDER — SODIUM,POTASSIUM PHOSPHATES 278-250MG
1 POWDER IN PACKET (EA) ORAL
Qty: 0 | Refills: 0 | Status: COMPLETED | OUTPATIENT
Start: 2018-06-01 | End: 2018-06-01

## 2018-06-01 RX ADMIN — Medication 1 TABLET(S): at 17:04

## 2018-06-01 RX ADMIN — Medication 1 TABLET(S): at 12:18

## 2018-06-01 RX ADMIN — PANTOPRAZOLE SODIUM 40 MILLIGRAM(S): 20 TABLET, DELAYED RELEASE ORAL at 17:04

## 2018-06-01 RX ADMIN — Medication 1 TABLET(S): at 22:11

## 2018-06-01 RX ADMIN — CEFTRIAXONE 100 GRAM(S): 500 INJECTION, POWDER, FOR SOLUTION INTRAMUSCULAR; INTRAVENOUS at 12:17

## 2018-06-01 RX ADMIN — LIDOCAINE 1 PATCH: 4 CREAM TOPICAL at 12:18

## 2018-06-01 RX ADMIN — PANTOPRAZOLE SODIUM 40 MILLIGRAM(S): 20 TABLET, DELAYED RELEASE ORAL at 05:32

## 2018-06-01 RX ADMIN — Medication 1 TABLET(S): at 09:07

## 2018-06-01 RX ADMIN — Medication 3 MILLIGRAM(S): at 22:11

## 2018-06-01 NOTE — PROGRESS NOTE ADULT - PROBLEM SELECTOR PLAN 1
Found to have variceal bleed on emergent EGD, s/p banding x2. Now s/p octreotide gtt, ppt gtt, 5u pRBC, 4u FFP, and 2u platelets. Hgb stable. Abd doppler u/s was limited due to body habitus and bowel gas, but proximal celiac artery and SMA appeared patent.  - cont to check CBC QD  - ppi PO BID  - c/w ceftriaxone through 6/1 (today 5/5)  - vasc surgery following, appreciate recs

## 2018-06-01 NOTE — PROGRESS NOTE ADULT - ASSESSMENT
78M with PMHx of EtOH abuse, alcoholic cirrhosis, HTN, distant hx of Guillan [place], chronic back pain who presented with acute blood loss anemia secondary to variceal bleed now s/p protonix and octreotide gtt

## 2018-06-01 NOTE — PROGRESS NOTE ADULT - PROBLEM SELECTOR PLAN 2
Has hx of EtOH abuse. Viral hepatitis panel negative,   - tx of UGIB as above  - MRCP pending   - f/u hepatitis panel  - holding home lactulose

## 2018-06-01 NOTE — PROGRESS NOTE ADULT - SUBJECTIVE AND OBJECTIVE BOX
PROVIDER CONTACT INFO:  MD ALISHA Gregg Pager: [phone]  Long Range Pager: [phone]    KETAN TIPTON  78y  Male      Patient is a 78y old  Male who presents with a chief complaint of GI bleed (27 May 2018 02:29)      INTERVAL HPI/OVERNIGHT EVENTS: No overnight events    T(C): 36.9 (06-01-18 @ 05:32), Max: 36.9 (05-31-18 @ 21:42)  HR: 54 (06-01-18 @ 05:32) (54 - 66)  BP: 138/64 (06-01-18 @ 05:32) (114/60 - 144/59)  RR: 17 (06-01-18 @ 05:32) (17 - 18)  SpO2: 99% (06-01-18 @ 05:32) (97% - 100%)  Wt(kg): --Vital Signs Last 24 Hrs  T(C): 36.9 (01 Jun 2018 05:32), Max: 36.9 (31 May 2018 21:42)  T(F): 98.4 (01 Jun 2018 05:32), Max: 98.5 (31 May 2018 21:42)  HR: 54 (01 Jun 2018 05:32) (54 - 66)  BP: 138/64 (01 Jun 2018 05:32) (114/60 - 144/59)  BP(mean): --  RR: 17 (01 Jun 2018 05:32) (17 - 18)  SpO2: 99% (01 Jun 2018 05:32) (97% - 100%)    PHYSICAL EXAM:  GENERAL: NAD, well-groomed, well-developed  HEAD:  Atraumatic, Normocephalic  EYES: EOMI, PERRLA, conjunctiva and sclera clear  ENMT: No tonsillar erythema, exudates,; Moist mucous membranes. No lesions  NECK: Supple, No JVD, Normal thyroid  CHEST/LUNG: Clear to percussion bilaterally; No rales, rhonchi, wheezing, or rubs  HEART: Regular rate and rhythm; No murmurs, rubs, or gallops  ABDOMEN: Soft, Nontender, Nondistended; Bowel sounds present  EXTREMITIES:  2+ Peripheral Pulses, No clubbing, cyanosis, or edema  LYMPH: No lymphadenopathy noted  SKIN: No rashes or lesions  PSYCH: Alert & Oriented x3    Consultant(s) Notes Reviewed:  [x ] YES  [ ] NO  Care Discussed with Consultants/Other Providers [ x] YES  [ ] NO    LABS:                        8.5    8.09  )-----------( 155      ( 31 May 2018 06:50 )             26.0     05-31    137  |  102  |  19  ----------------------------<  119<H>  4.0   |  25  |  1.09    Ca    7.6<L>      31 May 2018 06:50  Phos  2.4     05-31  Mg     1.7     05-31    TPro  5.4<L>  /  Alb  2.3<L>  /  TBili  0.6  /  DBili  x   /  AST  25  /  ALT  21  /  AlkPhos  95  05-31    PT/INR - ( 31 May 2018 06:50 )   PT: 16.6 SEC;   INR: 1.48          PTT - ( 31 May 2018 06:50 )  PTT:27.3 SEC PROVIDER CONTACT INFO:  MD ALISHA Gregg Pager: [phone]  Long Range Pager: [phone]    KETAN TIPTON  78y  Male      Patient is a 78y old  Male who presents with a chief complaint of GI bleed (27 May 2018 02:29)      INTERVAL HPI/OVERNIGHT EVENTS: No overnight events. This AM pt denies F/C/N/V, CP/SOB, abd pain, dysuria, constipation/diarrhea. Pt states he has had bowel movements, denies melena, hematochezia, hematemesis, or BRBPR    T(C): 36.9 (06-01-18 @ 05:32), Max: 36.9 (05-31-18 @ 21:42)  HR: 54 (06-01-18 @ 05:32) (54 - 66)  BP: 138/64 (06-01-18 @ 05:32) (114/60 - 144/59)  RR: 17 (06-01-18 @ 05:32) (17 - 18)  SpO2: 99% (06-01-18 @ 05:32) (97% - 100%)  Wt(kg): --Vital Signs Last 24 Hrs  T(C): 36.9 (01 Jun 2018 05:32), Max: 36.9 (31 May 2018 21:42)  T(F): 98.4 (01 Jun 2018 05:32), Max: 98.5 (31 May 2018 21:42)  HR: 54 (01 Jun 2018 05:32) (54 - 66)  BP: 138/64 (01 Jun 2018 05:32) (114/60 - 144/59)  BP(mean): --  RR: 17 (01 Jun 2018 05:32) (17 - 18)  SpO2: 99% (01 Jun 2018 05:32) (97% - 100%)    PHYSICAL EXAM:  GENERAL: NAD  HEAD:  Atraumatic, Normocephalic  EYES: EOMI, PERRLA, conjunctiva and sclera clear  ENMT: No tonsillar erythema, exudates,; Moist mucous membranes. No lesions  NECK: Supple, No JVD, Normal thyroid  CHEST/LUNG: Clear to percussion bilaterally; No rales, rhonchi, wheezing, or rubs  HEART: Regular rate and rhythm; No murmurs, rubs, or gallops  ABDOMEN: Soft, diffusely ttp, worse in epigastric region, Nondistended; Bowel sounds present  EXTREMITIES:  2+ Peripheral Pulses, No clubbing, cyanosis, or edema  LYMPH: No lymphadenopathy noted  SKIN: No rashes or lesions  PSYCH: Alert & Oriented x3    Consultant(s) Notes Reviewed:  [x ] YES  [ ] NO  Care Discussed with Consultants/Other Providers [ x] YES  [ ] NO    LABS:                        8.5    8.09  )-----------( 155      ( 31 May 2018 06:50 )             26.0     05-31    137  |  102  |  19  ----------------------------<  119<H>  4.0   |  25  |  1.09    Ca    7.6<L>      31 May 2018 06:50  Phos  2.4     05-31  Mg     1.7     05-31    TPro  5.4<L>  /  Alb  2.3<L>  /  TBili  0.6  /  DBili  x   /  AST  25  /  ALT  21  /  AlkPhos  95  05-31    PT/INR - ( 31 May 2018 06:50 )   PT: 16.6 SEC;   INR: 1.48          PTT - ( 31 May 2018 06:50 )  PTT:27.3 SEC

## 2018-06-02 LAB — ANA TITR SER: NEGATIVE — SIGNIFICANT CHANGE UP

## 2018-06-02 PROCEDURE — 99233 SBSQ HOSP IP/OBS HIGH 50: CPT | Mod: GC

## 2018-06-02 RX ADMIN — Medication 20 MILLIGRAM(S): at 06:03

## 2018-06-02 RX ADMIN — PANTOPRAZOLE SODIUM 40 MILLIGRAM(S): 20 TABLET, DELAYED RELEASE ORAL at 18:08

## 2018-06-02 RX ADMIN — Medication 3 MILLIGRAM(S): at 21:52

## 2018-06-02 RX ADMIN — PANTOPRAZOLE SODIUM 40 MILLIGRAM(S): 20 TABLET, DELAYED RELEASE ORAL at 06:03

## 2018-06-02 NOTE — PROGRESS NOTE ADULT - ASSESSMENT
78M with PMHx of EtOH abuse, alcoholic cirrhosis, HTN, distant hx of Guillan [place], chronic back pain who presented with acute blood loss anemia secondary to variceal bleed now s/p protonix and octreotide gtt

## 2018-06-02 NOTE — PROGRESS NOTE ADULT - SUBJECTIVE AND OBJECTIVE BOX
PROVIDER CONTACT INFO:  MD ALISHA Gregg Pager: [phone]  [place] Pager: [phone]    KETAN TIPTON  78y  Male      Patient is a 78y old  Male who presents with a chief complaint of GI bleed (27 May 2018 02:29)      INTERVAL HPI/OVERNIGHT EVENTS: No overnight events. This AM pt states he feels well, denies F/C/N/V, CP/SOB, abd pain, dysuria, constipation/diarrhea.     T(C): 36.9 (06-02-18 @ 05:19), Max: 37.5 (06-02-18 @ 01:00)  HR: 65 (06-02-18 @ 05:19) (54 - 66)  BP: 115/52 (06-02-18 @ 05:19) (112/66 - 132/59)  RR: 18 (06-02-18 @ 05:19) (16 - 18)  SpO2: 98% (06-02-18 @ 05:19) (97% - 100%)  Wt(kg): --Vital Signs Last 24 Hrs  T(C): 36.9 (02 Jun 2018 05:19), Max: 37.5 (02 Jun 2018 01:00)  T(F): 98.5 (02 Jun 2018 05:19), Max: 99.5 (02 Jun 2018 01:00)  HR: 65 (02 Jun 2018 05:19) (54 - 66)  BP: 115/52 (02 Jun 2018 05:19) (112/66 - 132/59)  BP(mean): --  RR: 18 (02 Jun 2018 05:19) (16 - 18)  SpO2: 98% (02 Jun 2018 05:19) (97% - 100%)    PHYSICAL EXAM:  GENERAL: NAD, well-groomed, well-developed  HEAD:  Atraumatic, Normocephalic  EYES: EOMI, PERRLA, conjunctiva and sclera clear  ENMT: No tonsillar erythema, exudates,; Moist mucous membranes. No lesions  NECK: Supple, No JVD, Normal thyroid  CHEST/LUNG: Clear to percussion bilaterally; No rales, rhonchi, wheezing, or rubs  HEART: Regular rate and rhythm; No murmurs, rubs, or gallops  ABDOMEN: Soft, diffusely ttp in all abd quadrants, Nondistended; Bowel sounds present.  EXTREMITIES:  2+ Peripheral Pulses, No clubbing, cyanosis, or edema  LYMPH: No lymphadenopathy noted  SKIN: No rashes or lesions  PSYCH: Alert & Oriented x3    Consultant(s) Notes Reviewed:  [x ] YES  [ ] NO  Care Discussed with Consultants/Other Providers [ x] YES  [ ] NO    LABS:                        9.5    9.02  )-----------( 189      ( 01 Jun 2018 06:30 )             29.4     06-01    137  |  103  |  18  ----------------------------<  122<H>  4.0   |  24  |  1.07    Ca    7.9<L>      01 Jun 2018 06:30  Phos  2.4     06-01  Mg     1.7     06-01

## 2018-06-03 ENCOUNTER — TRANSCRIPTION ENCOUNTER (OUTPATIENT)
Age: 78
End: 2018-06-03

## 2018-06-03 DIAGNOSIS — N28.89 OTHER SPECIFIED DISORDERS OF KIDNEY AND URETER: ICD-10-CM

## 2018-06-03 LAB
MITOCHONDRIA AB SER-ACNC: SIGNIFICANT CHANGE UP
SMOOTH MUSCLE AB SER-ACNC: SIGNIFICANT CHANGE UP

## 2018-06-03 PROCEDURE — 99233 SBSQ HOSP IP/OBS HIGH 50: CPT | Mod: GC

## 2018-06-03 RX ADMIN — Medication 100 MILLIGRAM(S): at 22:04

## 2018-06-03 RX ADMIN — PANTOPRAZOLE SODIUM 40 MILLIGRAM(S): 20 TABLET, DELAYED RELEASE ORAL at 17:50

## 2018-06-03 RX ADMIN — Medication 100 MILLIGRAM(S): at 05:00

## 2018-06-03 RX ADMIN — PANTOPRAZOLE SODIUM 40 MILLIGRAM(S): 20 TABLET, DELAYED RELEASE ORAL at 05:00

## 2018-06-03 RX ADMIN — Medication 100 MILLIGRAM(S): at 00:38

## 2018-06-03 RX ADMIN — Medication 3 MILLIGRAM(S): at 22:04

## 2018-06-03 RX ADMIN — LIDOCAINE 1 PATCH: 4 CREAM TOPICAL at 13:10

## 2018-06-03 RX ADMIN — Medication 20 MILLIGRAM(S): at 07:02

## 2018-06-03 RX ADMIN — Medication 100 MILLIGRAM(S): at 13:10

## 2018-06-03 NOTE — PROGRESS NOTE ADULT - PROBLEM SELECTOR PLAN 2
Has hx of EtOH abuse. Viral hepatitis panel negative. MRCP showing 2.4cm lesion of liver dome, f/u MRI w IV contrast recommended.   - tx of UGIB as above  - f/u hepatitis panel  - holding home lactulose Has hx of EtOH abuse. Viral hepatitis panel negative. MRCP showing 2.4cm lesion of liver dome, f/u MRI w IV contrast recommended, will ask GI if warranted as inpatient  - tx of UGIB as above  - holding home lactulose

## 2018-06-03 NOTE — DISCHARGE NOTE ADULT - CARE PLAN
Principal Discharge DX:	Upper GI hemorrhage  Goal:	Cessation of Bleeding  Assessment and plan of treatment:	In the hospital you were found to have bleeding from distended blood vessels in your esophagus. You underwent an endoscopic procedure and was given medication to help treat this bleeding. Continue taking this medication as prescribed upon discharge. You are to follow-up with the liver specialists within one week of discharge. Return to the hospital if you experience dark, bloody stools, bloody vomit, coffee ground vomiting, or bright red blood per rectum.  Secondary Diagnosis:	Liver cirrhosis  Assessment and plan of treatment:	You have a history of liver cirrhosis. You are to continue abstaining from alcohol upon discharge. You are to follow-up with your liver specialists within one week of discharge. Principal Discharge DX:	Upper GI hemorrhage  Goal:	Prevention of future bleeding  Assessment and plan of treatment:	In the hospital you were found to have bleeding from dilated blood vessels in your esophagus that were caused by cirrhosis of your liver. You had an endoscopy and these blood vessels were banded to prevent further bleeding. You were started on medications to help prevent bleeding in the future. Please continue taking your medications as prescribed upon discharge. Please schedule an appointment with your primary doctor within 1 to 2 weeks of discharge to follow up. Return to the hospital if you experience dark, bloody stools, bloody vomit, vomiting, or bright red blood from your rectum.  Secondary Diagnosis:	Liver cirrhosis  Assessment and plan of treatment:	You have liver cirrhosis as a result of alcohol abuse. We encourage you to continue to abstain from any alcohol consumption in the future as this will only worsen your disease. Please make an appointment with Hepatology to follow up on your cirrhosis and discuss your plan of care. You may make an appointment with Dr. Cowan or one of her associates.

## 2018-06-03 NOTE — DISCHARGE NOTE ADULT - MEDICATION SUMMARY - MEDICATIONS TO TAKE
I will START or STAY ON the medications listed below when I get home from the hospital:    propranolol 10 mg oral tablet  -- 1 tab(s) by mouth 2 times a day  -- Indication: For Liver cirrhosis    furosemide  -- 1 tab(s) by mouth once a day  -- Indication: For Liver cirrhosis    lactulose 10 g/15 mL oral syrup  -- orally 3 times a day  -- Indication: For Liver cirrhosis    pantoprazole 40 mg oral delayed release tablet  -- 1 tab(s) by mouth 2 times a day  -- Indication: For Gastrointestinal hemorrhage    Calcium 500+D  -- Indication: For Vitamin    Vitamin D3  -- Indication: For Vitamin

## 2018-06-03 NOTE — PROGRESS NOTE ADULT - ASSESSMENT
78M with PMHx of EtOH abuse, alcoholic cirrhosis, HTN, distant hx of Guillan [place], chronic back pain who presented with acute blood loss anemia secondary to variceal bleed now s/p protonix and octreotide gtt

## 2018-06-03 NOTE — DISCHARGE NOTE ADULT - CARE PROVIDER_API CALL
Vee Cowan), Gastroenterology; Internal Medicine  [address] [place] [address]  Phone: [phone]  Fax: [phone]

## 2018-06-03 NOTE — DISCHARGE NOTE ADULT - PATIENT PORTAL LINK FT
You can access the [facility] Patient Portal, offered by [facility], by registering with the following website: [email or web address]

## 2018-06-03 NOTE — PROGRESS NOTE ADULT - PROBLEM SELECTOR PLAN 3
Serum Cr on admission was 1.66, most likely prerenal in the setting of hypovolemia 2/2 UGIB; now wnl  - renally dose all meds  - avoid nephrotoxins 2.5cm irregular hypodensity found on lower pole of R kidney on MRCP, ddx infection vs neoplasm.  - f/u renal ultrasound for further workup

## 2018-06-03 NOTE — DISCHARGE NOTE ADULT - MEDICATION SUMMARY - MEDICATIONS TO STOP TAKING
I will STOP taking the medications listed below when I get home from the hospital:    Lactated Ringers Injection intravenous solution  -- 100 square centimeter intravenous every 1 to 2 hours    octreotide  -- 25 microgram(s) intravenous every 1 to 2 hours drip    pantoprazole 40 mg intravenous injection  -- 8 milligram(s) intravenous every 1 to 2 hours drip    piperacillin-tazobactam 2 g-0.25 g intravenous injection  -- 3.375 gram(s) intravenous every 8 hours    naproxen  -- 1 tab(s) by mouth 2 times a day

## 2018-06-03 NOTE — DISCHARGE NOTE ADULT - HOSPITAL COURSE
HPI:  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[place], chronic back pain, admitted to [facility] for lower abd pain and chest pain, transferred to [facility] for variceal GIB.  Prior to admission, pt had been taking naproxen bid x 2-3 weeks for low back pain. Pt saw his PMD around this time, had + fecal occult blood test and PMD planned for colonoscopy next week.      Hosp course [facility]: Pt presented to [facility] on 5/25 for lower abd pain and chest pain, was admitted for r/o ACS, UTI, and intractable lower abd pain. Around 6am on 5/26, pt c/o worsening chest pain, found to have new-onset AFib, given full-dose lovenox and aspirin. Pt then had coffee-ground emesis x1, BRBPR x2 and was hypotensive to 77/47. ASA, lovenox, and naproxen were d/c'd. Pt was given 4L bolus fluids, then received 2 PRBC, 1 platelets, and 3 FFP. Pt started on protonix gtt and octreotide gtt, made NPO with NGT draining approx 500cc coffee ground emesis. Pt then transferred to [facility] for further management. 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 He completed 3 days of octreotide and protonix drips and was transferred to the general medical floor. He was able to tolerate a regular diet and his H/H remained stable. He was started on PO protonix and propranolol. He underwent an MRCP that showed a 2cm lesion in the right kidney and a 2cm lesion in the liver. He underwent a renal ultrasound of his right kidney which showed ___________. A PT evaluation determined he would benefit from acute rehab. He was determined to be stable for discharge with follow-up with hepatology within one week of discharge from rehab. HPI:  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[place], chronic back pain, admitted to [facility] for lower abd pain and chest pain, transferred to [facility] for variceal GIB.  Prior to admission, pt had been taking naproxen bid x 2-3 weeks for low back pain. Pt saw his PMD around this time, had + fecal occult blood test and PMD planned for colonoscopy next week.      Hosp course [facility]:   Pt presented to [facility] on 5/25 for lower abd pain and chest pain, was admitted for r/o ACS, UTI, and intractable lower abd pain. Around 6am on 5/26, pt c/o worsening chest pain, found to have new-onset AFib, given full-dose lovenox and aspirin. Pt then had coffee-ground emesis x1, BRBPR x2 and was hypotensive to 77/47. ASA, lovenox, and naproxen were d/c'd. Pt was given 4L bolus fluids, then received 2 PRBC, 1 platelets, and 3 FFP. Pt started on protonix gtt and octreotide gtt, made NPO with NGT draining approx 500cc coffee ground emesis. Pt then transferred to [facility] MICU for further management.     At [facility], the patient underwent endoscopic evaluation revealing large varices and had banding with resolution of bleeding. He returned to the MICU on octreotide and Protonix gtt and transferred to the general medical floor after 3 days. His H/H remained stable with no further episodes of bleeding and patient was transitioned to regular diet. He underwent an MRCP on 6/1 showing incidental finding of a 2-cm lesion in the R kidney as well as a 2-cm lesion in the liver. He underwent a renal ultrasound to further evaluate the lesion however, by this time, the lesion had resolved. He was followed by Hepatology throughout his stay and underwent eval of the liver lesion with a liver-specific MRI. He was provided with the contact information for Hepatology so he could make an appointment to follow-up after discharge. He was evaluated by Physical Therapy in hospital who determined he would benefit from acute rehab. He was determined to be stable for discharge with follow-up with Hepatology within one week of discharge from rehab. HPI:  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[place], chronic back pain, admitted to [facility] for lower abd pain and chest pain, transferred to [facility] for variceal GIB.  Prior to admission, pt had been taking naproxen bid x 2-3 weeks for low back pain. Pt saw his PMD around this time, had + fecal occult blood test and PMD planned for colonoscopy next week.      Hosp course [facility]:   Pt presented to [facility] on 5/25 for lower abd pain and chest pain, was admitted for r/o ACS, UTI, and intractable lower abd pain. Around 6am on 5/26, pt c/o worsening chest pain, found to have new-onset AFib, given full-dose lovenox and aspirin. Pt then had coffee-ground emesis x1, BRBPR x2 and was hypotensive to 77/47. ASA, lovenox, and naproxen were d/c'd. Pt was given 4L bolus fluids, then received 2 PRBC, 1 platelets, and 3 FFP. Pt started on protonix gtt and octreotide gtt, made NPO with NGT draining approx 500cc coffee ground emesis. Pt then transferred to [facility] MICU for further management.     At [facility], the patient underwent endoscopic evaluation revealing large varices and had banding with resolution of bleeding. He returned to the MICU on octreotide and Protonix gtt and transferred to the general medical floor after 3 days. His H/H remained stable with no further episodes of bleeding and patient was transitioned to regular diet. He underwent an MRCP on 6/1 showing incidental finding of a 2-cm lesion in the R kidney as well as a 2-cm lesion in the liver. He underwent a renal ultrasound to further evaluate the lesion however, by this time, the lesion had resolved. He was followed by Hepatology throughout his stay and underwent eval of the liver lesion with a liver-specific MRI. He was provided with the contact information for Hepatology so he could make an appointment to follow-up after discharge. He was evaluated by Physical Therapy in hospital who determined he would benefit from acute rehab. Patient elected to return home with home services. He was determined to be stable for discharge on 6/7/18 with instructions to follow-up with his PCP and Hepatology within one week of discharge.

## 2018-06-03 NOTE — DISCHARGE NOTE ADULT - ADDITIONAL INSTRUCTIONS
Call your primary doctor to schedule a follow-up appointment within 1 to 2 weeks of discharge.   Call [phone] to schedule an appointment with a hepatologist in our clinic upon discharge.

## 2018-06-03 NOTE — PROGRESS NOTE ADULT - SUBJECTIVE AND OBJECTIVE BOX
PROVIDER CONTACT INFO:  MD ALISHA Gregg Pager: [phone]  [place] Pager: [phone]    KETAN TIPTON  78y  Male      Patient is a 78y old  Male who presents with a chief complaint of GI bleed (27 May 2018 02:29)      INTERVAL HPI/OVERNIGHT EVENTS: No overnight events. This AM pt denies F/C/N/V, CP/SOB, abd pain, pelvic pain, constipation/diarrhea    T(C): 36.5 (06-03-18 @ 05:45), Max: 37 (06-02-18 @ 22:47)  HR: 61 (06-03-18 @ 05:45) (54 - 62)  BP: 110/56 (06-03-18 @ 05:45) (110/56 - 116/50)  RR: 18 (06-03-18 @ 05:45) (16 - 20)  SpO2: 96% (06-03-18 @ 05:45) (96% - 100%)  Wt(kg): --Vital Signs Last 24 Hrs  T(C): 36.5 (03 Jun 2018 05:45), Max: 37 (02 Jun 2018 22:47)  T(F): 97.7 (03 Jun 2018 05:45), Max: 98.6 (02 Jun 2018 22:47)  HR: 61 (03 Jun 2018 05:45) (54 - 62)  BP: 110/56 (03 Jun 2018 05:45) (110/56 - 116/50)  BP(mean): --  RR: 18 (03 Jun 2018 05:45) (16 - 20)  SpO2: 96% (03 Jun 2018 05:45) (96% - 100%)    PHYSICAL EXAM:  GENERAL: NAD  HEAD:  Atraumatic, Normocephalic  EYES: EOMI, PERRLA, conjunctiva and sclera clear  ENMT: No tonsillar erythema, exudates,; Moist mucous membranes. No lesions  NECK: Supple, No JVD, Normal thyroid  CHEST/LUNG: Clear to percussion bilaterally; No rales, rhonchi, wheezing, or rubs  HEART: Regular rate and rhythm; No murmurs, rubs, or gallops  ABDOMEN: Obese, Soft, diffusely mildly ttp, Nondistended; Bowel sounds present  EXTREMITIES:  2+ Peripheral Pulses, No clubbing, cyanosis, or edema  LYMPH: No lymphadenopathy noted  SKIN: No rashes or lesions  PSYCH: Alert & Oriented x3    Consultant(s) Notes Reviewed:  [x ] YES  [ ] NO  Care Discussed with Consultants/Other Providers [ x] YES  [ ] NO

## 2018-06-03 NOTE — DISCHARGE NOTE ADULT - PLAN OF CARE
Cessation of Bleeding In the hospital you were found to have bleeding from distended blood vessels in your esophagus. You underwent an endoscopic procedure and was given medication to help treat this bleeding. Continue taking this medication as prescribed upon discharge. You are to follow-up with the liver specialists within one week of discharge. Return to the hospital if you experience dark, bloody stools, bloody vomit, coffee ground vomiting, or bright red blood per rectum. You have a history of liver cirrhosis. You are to continue abstaining from alcohol upon discharge. You are to follow-up with your liver specialists within one week of discharge. Prevention of future bleeding In the hospital you were found to have bleeding from dilated blood vessels in your esophagus that were caused by cirrhosis of your liver. You had an endoscopy and these blood vessels were banded to prevent further bleeding. You were started on medications to help prevent bleeding in the future. Please continue taking your medications as prescribed upon discharge. Please schedule an appointment with your primary doctor within 1 to 2 weeks of discharge to follow up. Return to the hospital if you experience dark, bloody stools, bloody vomit, vomiting, or bright red blood from your rectum. You have liver cirrhosis as a result of alcohol abuse. We encourage you to continue to abstain from any alcohol consumption in the future as this will only worsen your disease. Please make an appointment with Hepatology to follow up on your cirrhosis and discuss your plan of care. You may make an appointment with Dr. Cowan or one of her associates.

## 2018-06-03 NOTE — PROGRESS NOTE ADULT - PROBLEM SELECTOR PLAN 1
Found to have variceal bleed on emergent EGD, s/p banding x2. Now s/p octreotide gtt, ppt gtt, 5 days of ceftriaxone, 5u pRBC, 4u FFP, and 2u platelets. Hgb stable. Abd doppler u/s was limited due to body habitus and bowel gas, but proximal celiac artery and SMA appeared patent.  - cont to check CBC QD  - ppi PO BID  - will need close outpatient follow-up/surveillance Found to have variceal bleed on emergent EGD, s/p banding x2. Now s/p octreotide gtt, ppt gtt, 5 days of ceftriaxone, 5u pRBC, 4u FFP, and 2u platelets. Hgb stable. Abd doppler u/s was limited due to body habitus and bowel gas, but proximal celiac artery and SMA appeared patent.  - cont to check CBC QD  - ppi PO BID  - will f/u with GI whether or not MRI liver is indicated as inpatient

## 2018-06-03 NOTE — DISCHARGE NOTE ADULT - FINDINGS/TREATMENT
< from: MR Abdomen w/wo IV Cont (06.01.18 @ 21:26) >  IMPRESSION:   Irregular hypointense region involving the lower pole of the right kidney   with peripheral enhancement measuring 2.5 cm which again may represent   infection versus neoplasm. Clinical correlation is recommended.   Additional heterogeneously enhancing lesion at the upper pole of the left   kidney measuring 1.9 cm.  Cirrhotic contour to the liver. Lesion in the liver dome measuring 2.4 cm   with arterial enhancement and presumed washout. Follow-up MRI (liver   protocol) with intravenous contrast is recommended.  Cholelithiasis. Normal caliber common bile duct. No choledocholithiasis.   No pancreatic ductal dilatation.

## 2018-06-04 LAB
BLD GP AB SCN SERPL QL: NEGATIVE — SIGNIFICANT CHANGE UP
BUN SERPL-MCNC: 26 MG/DL — HIGH (ref 7–23)
CALCIUM SERPL-MCNC: 8.2 MG/DL — LOW (ref 8.4–10.5)
CHLORIDE SERPL-SCNC: 101 MMOL/L — SIGNIFICANT CHANGE UP (ref 98–107)
CO2 SERPL-SCNC: 25 MMOL/L — SIGNIFICANT CHANGE UP (ref 22–31)
CREAT SERPL-MCNC: 1.13 MG/DL — SIGNIFICANT CHANGE UP (ref 0.5–1.3)
GLUCOSE SERPL-MCNC: 115 MG/DL — HIGH (ref 70–99)
HCT VFR BLD CALC: 25.6 % — LOW (ref 39–50)
HGB BLD-MCNC: 8.2 G/DL — LOW (ref 13–17)
MAGNESIUM SERPL-MCNC: 1.8 MG/DL — SIGNIFICANT CHANGE UP (ref 1.6–2.6)
MCHC RBC-ENTMCNC: 29 PG — SIGNIFICANT CHANGE UP (ref 27–34)
MCHC RBC-ENTMCNC: 32 % — SIGNIFICANT CHANGE UP (ref 32–36)
MCV RBC AUTO: 90.5 FL — SIGNIFICANT CHANGE UP (ref 80–100)
NRBC # FLD: 0 — SIGNIFICANT CHANGE UP
PHOSPHATE SERPL-MCNC: 3 MG/DL — SIGNIFICANT CHANGE UP (ref 2.5–4.5)
PLATELET # BLD AUTO: 225 K/UL — SIGNIFICANT CHANGE UP (ref 150–400)
PMV BLD: 10.7 FL — SIGNIFICANT CHANGE UP (ref 7–13)
POTASSIUM SERPL-MCNC: 4.5 MMOL/L — SIGNIFICANT CHANGE UP (ref 3.5–5.3)
POTASSIUM SERPL-SCNC: 4.5 MMOL/L — SIGNIFICANT CHANGE UP (ref 3.5–5.3)
RBC # BLD: 2.83 M/UL — LOW (ref 4.2–5.8)
RBC # FLD: 17 % — HIGH (ref 10.3–14.5)
RH IG SCN BLD-IMP: POSITIVE — SIGNIFICANT CHANGE UP
SODIUM SERPL-SCNC: 137 MMOL/L — SIGNIFICANT CHANGE UP (ref 135–145)
WBC # BLD: 8.16 K/UL — SIGNIFICANT CHANGE UP (ref 3.8–10.5)
WBC # FLD AUTO: 8.16 K/UL — SIGNIFICANT CHANGE UP (ref 3.8–10.5)

## 2018-06-04 PROCEDURE — 99232 SBSQ HOSP IP/OBS MODERATE 35: CPT | Mod: GC

## 2018-06-04 PROCEDURE — 99233 SBSQ HOSP IP/OBS HIGH 50: CPT

## 2018-06-04 RX ADMIN — Medication 100 MILLIGRAM(S): at 05:18

## 2018-06-04 RX ADMIN — Medication 100 MILLIGRAM(S): at 13:25

## 2018-06-04 RX ADMIN — LIDOCAINE 1 PATCH: 4 CREAM TOPICAL at 00:26

## 2018-06-04 RX ADMIN — Medication 100 MILLIGRAM(S): at 21:46

## 2018-06-04 RX ADMIN — Medication 20 MILLIGRAM(S): at 17:10

## 2018-06-04 RX ADMIN — Medication 3 MILLIGRAM(S): at 21:47

## 2018-06-04 RX ADMIN — PANTOPRAZOLE SODIUM 40 MILLIGRAM(S): 20 TABLET, DELAYED RELEASE ORAL at 17:10

## 2018-06-04 RX ADMIN — PANTOPRAZOLE SODIUM 40 MILLIGRAM(S): 20 TABLET, DELAYED RELEASE ORAL at 05:18

## 2018-06-04 RX ADMIN — Medication 100 MILLIGRAM(S): at 21:47

## 2018-06-04 NOTE — PROGRESS NOTE ADULT - PROBLEM SELECTOR PLAN 5
Currently normotensive  -Holding home antihypertensives in setting of GI bleed DVT ppx: holding chemical ppx in setting of GI bleed  Soft diet with ensure supplementation

## 2018-06-04 NOTE — PROGRESS NOTE ADULT - SUBJECTIVE AND OBJECTIVE BOX
Patient is a 78y old  Male who presents with a chief complaint of GI bleed (03 Jun 2018 20:17)      SUBJECTIVE / OVERNIGHT EVENTS:  no abd pain/melena  MEDICATIONS  (STANDING):  benzonatate 100 milliGRAM(s) Oral three times a day  lidocaine   Patch 1 Patch Transdermal every other day  melatonin 3 milliGRAM(s) Oral at bedtime  pantoprazole    Tablet 40 milliGRAM(s) Oral two times a day  propranolol 20 milliGRAM(s) Oral two times a day    MEDICATIONS  (PRN):              PHYSICAL EXAM:  GENERAL: NAD, well-developed  HEAD:  Atraumatic, Normocephalic  EYES: EOMI, PERRLA, conjunctiva and sclera anicteric  NECK: Supple, No JVD  CHEST/LUNG: Clear to auscultation bilaterally; No wheeze  HEART: Regular rate and rhythm; No murmurs, rubs, or gallops  ABDOMEN: Soft, Nontender, Nondistended; Bowel sounds present, no hepatosplenomegaly, no rebound or guarding  EXTREMITIES:  2+ Peripheral Pulses, No clubbing, cyanosis, or edema  PSYCH: AAOx3  NEUROLOGY: non-focal, no asterixis  SKIN: No rashes or lesion    LABS:                        8.2    8.16  )-----------( 225      ( 04 Jun 2018 06:30 )             25.6     06-04    137  |  101  |  26<H>  ----------------------------<  115<H>  4.5   |  25  |  1.13    Ca    8.2<L>      04 Jun 2018 06:30  Phos  3.0     06-04  Mg     1.8     06-04                  RADIOLOGY & ADDITIONAL TESTS:

## 2018-06-04 NOTE — PROGRESS NOTE ADULT - PROBLEM SELECTOR PLAN 2
2/2 EtOH abuse. Viral hepatitis panel negative. Underwent MRCP 6/2 showing 2.4 cm lesion of liver dome which may represent HCC but will need MRI to confirm.   - Resume home lactulose  - Continue propranolol 20mg BID  - Was on transplant list but is not currently

## 2018-06-04 NOTE — PROGRESS NOTE ADULT - PROBLEM SELECTOR PLAN 1
Found to have variceal bleed on emergent EGD, s/p banding x2 as well as course of octreotide gtt, PPI gtt, ceftriaxone (5 days total), 5 units PRBCs, 4 units plts, 4 units FFP. H/H remains stable.   - Monitor CBC daily  - Continue PPI PO BID  - Repeat endoscopy in 3-4 weeks to evaluate varices as per GI

## 2018-06-04 NOTE — PROGRESS NOTE ADULT - ASSESSMENT
78M with PMH of alcoholic cirrhosis, HTN, chronic back pain presenting with acute blood loss anemia, transferred from [facility] to [facility] for acute variceal bleed, now s/p EBL.

## 2018-06-04 NOTE — PROGRESS NOTE ADULT - PROBLEM SELECTOR PLAN 4
Serum Cr on admission was 1.66, most likely prerenal in the setting of hypovolemia 2/2 UGIB; now wnl  - renally dose all meds  - avoid nephrotoxins Currently normotensive.   - Holding home antihypertensives in setting of GI bleed

## 2018-06-04 NOTE — PROGRESS NOTE ADULT - PROBLEM SELECTOR PLAN 3
Found to have 2.5 cm irregular hypodensity on lower pole of R kidney on MRCP unclear if infection vs. neoplasm.   - Renal ultrasound ordered and pending for further evaluation

## 2018-06-04 NOTE — PROGRESS NOTE ADULT - SUBJECTIVE AND OBJECTIVE BOX
Internal Medicine Team Progress Note    Anjana Garcia MD PGY1  Pager:  [phone] | [phone]    KETAN TIPTON  Patient is a 78y old  Male who presents with a chief complaint of GI bleed (03 Jun 2018 20:17)      INTERVAL HPI / SUBJECTIVE:      REVIEW OF SYSTEMS:   Constitutional: no fatigue, fever, chills, generalized weakness  HEENT: no eye pain, vision changes, rhinorrhea, sore throat  Respiratory: no cough, wheezing, shortness of breath  CV: no chest pain, palpitations, dyspnea on exertion, orthopnea, PND  GI: no decreased appetite, nausea, vomiting, abdominal pain, diarrhea, constipation, melena  : no dysuria, hematuria, urinary frequency, incontinence, nocturia  MSK: no joint or muscle pain, muscle weakness, joint swelling  Skin: no rashes, bruising, hair loss, color change  Neuro: no headache, dizziness, fainting, paresthesias, memory loss  Endo: no heat or cold intolerance, increased thirst, hot flashes  Psych: no changes in mood      MEDICATIONS:   MEDICATIONS  (STANDING):  benzonatate 100 milliGRAM(s) Oral three times a day  lidocaine   Patch 1 Patch Transdermal every other day  melatonin 3 milliGRAM(s) Oral at bedtime  pantoprazole    Tablet 40 milliGRAM(s) Oral two times a day  propranolol 20 milliGRAM(s) Oral two times a day    MEDICATIONS  (PRN):      ALLERGIES:   Allergies    Allergy Status Unknown  No Known Allergies    Intolerances        OBJECTIVE:  Vital Signs Last 24 Hrs  T(C): 37 (04 Jun 2018 05:11), Max: 37.1 (03 Jun 2018 14:18)  T(F): 98.6 (04 Jun 2018 05:11), Max: 98.7 (03 Jun 2018 14:18)  HR: 52 (04 Jun 2018 05:11) (52 - 58)  BP: 116/69 (04 Jun 2018 05:11) (116/69 - 146/71)  BP(mean): --  RR: 18 (04 Jun 2018 05:11) (18 - 24)  SpO2: 98% (04 Jun 2018 05:11) (98% - 100%)    I&O's Summary    03 Jun 2018 07:01  -  04 Jun 2018 07:00  --------------------------------------------------------  IN: 680 mL / OUT: 1050 mL / NET: -370 mL    PHYSICAL EXAM:  General: Well-appearing, NAD  HEENT:  EOMI, PERRL, conjunctiva and sclera clear, normal oropharynx  Neck:  Supple, no JVD, normal thyroid  Chest/Lungs: CTA B/L, no rales, rhonchi, rub or wheezing  Heart: RRR, normal S1, S2, no murmurs or gallops  Abdomen: Soft, obese, mildly distended, +tenderness to palpation of RUQ and epigastrium with voluntary rebound  Extremities: Peripheral pulses 2+ B/L, no edema, cyanosis or clubbing  Skin: Warm, well-perfused, no rashes or lesions  Neurological: A&Ox3, no focal deficits      LABS:  CBC Full  -  ( 04 Jun 2018 06:30 )  WBC Count : 8.16 K/uL  Hemoglobin : 8.2 g/dL  Hematocrit : 25.6 %  Platelet Count - Automated : 225 K/uL  Mean Cell Volume : 90.5 fL  Mean Cell Hemoglobin : 29.0 pg  Mean Cell Hemoglobin Concentration : 32.0 %    06-04    137  |  101  |  26<H>  ----------------------------<  115<H>  4.5   |  25  |  1.13    Ca    8.2<L>      04 Jun 2018 06:30  Phos  3.0     06-04  Mg     1.8     06-04      IMAGING:   < from: MR Abdomen w/wo IV Cont (06.01.18 @ 21:26) >  IMPRESSION:   Irregular hypointense region involving the lower pole of the right kidney   with peripheral enhancement measuring 2.5 cm which again may represent   infection versus neoplasm. Clinical correlation is recommended.   Additional heterogeneously enhancing lesion at the upper pole of the left   kidney measuring 1.9 cm.  Cirrhotic contour to the liver. Lesion in the liver dome measuring 2.4 cm   with arterial enhancement and presumed washout. Follow-up MRI (liver   protocol) with intravenous contrast is recommended.  Cholelithiasis. Normal caliber common bile duct. No choledocholithiasis.   No pancreatic ductal dilatation.      Labs and imaging personally reviewed and interpreted [ x ]  Consult notes reviewed [ x ]  Case discussed with consultants [  ] Internal Medicine Team Progress Note    Anjana Garcia MD PGY1  Pager:  [phone] | [phone]    KETAN TIPTON  Patient is a 78y old  Male who presents with a chief complaint of GI bleed (03 Jun 2018 20:17)      INTERVAL HPI / SUBJECTIVE:  No overnight events. This morning, patient states he is doing better. Denies abdominal pain, nausea, vomiting, melena, hematemesis, abdominal distension or chest pain. Has not had a BM since yesterday and cannot remember if it was bloody or not.     REVIEW OF SYSTEMS:   Constitutional: no fatigue, fever, chills, generalized weakness  HEENT: no eye pain, vision changes, rhinorrhea, sore throat  Respiratory: no cough, wheezing, shortness of breath  CV: no chest pain, palpitations, dyspnea on exertion, orthopnea, PND  GI: no decreased appetite, nausea, vomiting, abdominal pain, diarrhea, constipation, melena  : no dysuria, hematuria, urinary frequency, incontinence, nocturia  MSK: no joint or muscle pain, muscle weakness, joint swelling  Skin: no rashes, bruising, hair loss, color change  Neuro: no headache, dizziness, fainting, paresthesias, memory loss  Endo: no heat or cold intolerance, increased thirst, hot flashes  Psych: no changes in mood    MEDICATIONS:   benzonatate 100 milliGRAM(s) Oral three times a day  lidocaine   Patch 1 Patch Transdermal every other day  melatonin 3 milliGRAM(s) Oral at bedtime  pantoprazole    Tablet 40 milliGRAM(s) Oral two times a day  propranolol 20 milliGRAM(s) Oral two times a day    ALLERGIES:   No Known Allergies      OBJECTIVE:  Vital Signs Last 24 Hrs  T(C): 37 (04 Jun 2018 05:11), Max: 37.1 (03 Jun 2018 14:18)  T(F): 98.6 (04 Jun 2018 05:11), Max: 98.7 (03 Jun 2018 14:18)  HR: 52 (04 Jun 2018 05:11) (52 - 58)  BP: 116/69 (04 Jun 2018 05:11) (116/69 - 146/71)  RR: 18 (04 Jun 2018 05:11) (18 - 24)  SpO2: 98% (04 Jun 2018 05:11) (98% - 100%)    I&O's Summary    03 Jun 2018 07:01  -  04 Jun 2018 07:00  --------------------------------------------------------  IN: 680 mL / OUT: 1050 mL / NET: -370 mL    PHYSICAL EXAM:  General: Well-appearing, NAD  HEENT:  EOMI, PERRL, conjunctiva and sclera clear, normal oropharynx  Neck:  Supple, no JVD, normal thyroid  Chest/Lungs: CTA B/L, no rales, rhonchi, rub or wheezing  Heart: RRR, normal S1, S2, no murmurs or gallops  Abdomen: Soft, obese, mildly distended, +tenderness to palpation of RUQ and epigastrium with voluntary rebound  Extremities: Peripheral pulses 2+ B/L, no edema, cyanosis or clubbing  Skin: Warm, well-perfused, no rashes or lesions  Neurological: A&Ox3, no focal deficits    LABS:  CBC Full  -  ( 04 Jun 2018 06:30 )  WBC Count : 8.16 K/uL  Hemoglobin : 8.2 g/dL  Hematocrit : 25.6 %  Platelet Count - Automated : 225 K/uL  Mean Cell Volume : 90.5 fL  Mean Cell Hemoglobin : 29.0 pg  Mean Cell Hemoglobin Concentration : 32.0 %    06-04    137  |  101  |  26<H>  ----------------------------<  115<H>  4.5   |  25  |  1.13    Ca    8.2<L>      04 Jun 2018 06:30  Phos  3.0     06-04  Mg     1.8     06-04      IMAGING:   < from: MR Abdomen w/wo IV Cont (06.01.18 @ 21:26) >  IMPRESSION:   Irregular hypointense region involving the lower pole of the right kidney   with peripheral enhancement measuring 2.5 cm which again may represent   infection versus neoplasm. Clinical correlation is recommended.   Additional heterogeneously enhancing lesion at the upper pole of the left   kidney measuring 1.9 cm.  Cirrhotic contour to the liver. Lesion in the liver dome measuring 2.4 cm   with arterial enhancement and presumed washout. Follow-up MRI (liver   protocol) with intravenous contrast is recommended.  Cholelithiasis. Normal caliber common bile duct. No choledocholithiasis.   No pancreatic ductal dilatation.    Labs and imaging personally reviewed and interpreted [ x ]  Consult notes reviewed [ x ]  Case discussed with consultants [ x ]

## 2018-06-04 NOTE — PROGRESS NOTE ADULT - ASSESSMENT
Impression:  77yo M with EtOH abuse, alcoholic cirrhosis, HTN, distant hx Guillain-[place], chronic back pain, admitted to [facility] for lower abd pain and chest pain, transferred to [facility] for GIB.    Problem List:  1) Acute variceal bleed now s/p EBL of esophageal varices: clinically resolved. s/p course of octreotide/abx.  2) Possible HCC seen on MR  3) Decompensated ETOH Cirrhosis, previously on transplant list but no longer - MELD-Na 15       - varices: present s/p EBL 5/27       - ascites: none on CT. Pt has a history of ascites       - SPE: none, on lactulose at home       - HCC: concerning lesion seen on MR   4) cholelithiasis  5) SMA stenosis   - right renal lesion    Plan:  - trend CBC  - low Na diet  - PPI PO BID  - propranolol 20 mg bid   - repeat endoscopy in 3-4 weeks to eval varices.   - would restart home lactulose dose Impression:  77yo M with EtOH abuse, alcoholic cirrhosis, HTN, distant hx Guillain-[place], chronic back pain, admitted to [facility] for lower abd pain and chest pain, transferred to [facility] for GIB.    Problem List:  1) Acute variceal bleed now s/p EBL of esophageal varices: clinically resolved. s/p course of octreotide/abx.  2) Possible HCC seen on MR  3) Decompensated ETOH Cirrhosis, previously on transplant list but no longer - MELD-Na 15       - varices: present s/p EBL 5/27       - ascites: none on CT. Pt has a history of ascites       - SPE: none, on lactulose at home       - HCC: concerning lesion seen on MR   4) cholelithiasis  5) SMA stenosis   - right renal lesion    Plan:  - trend CBC  - low Na diet  - PPI PO BID  - propranolol 20 mg bid   - repeat endoscopy in 3-4 weeks to eval varices.   - would restart home lactulose dose  - check an MR hepatocyte phase w Eovist to rule out HCC Impression:  77yo M with EtOH abuse, alcoholic cirrhosis, HTN, distant hx Guillain-[place], chronic back pain, admitted to [facility] for lower abd pain and chest pain, transferred to [facility] for GIB.    Problem List:  1) Acute variceal bleed now s/p EBL of esophageal varices: clinically resolved. s/p course of octreotide/abx.  2) Possible HCC seen on MR: reviewed w radiology. Inconclusive as the evaluation for contrast washout was limited.  3) Decompensated ETOH Cirrhosis, previously on transplant list but no longer - MELD-Na 15       - varices: present s/p EBL 5/27       - ascites: none on CT. Pt has a history of ascites       - SPE: none, on lactulose at home       - HCC: concerning lesion seen on MR   4) cholelithiasis  5) SMA stenosis   - right renal lesion    Plan:  - trend CBC  - low Na diet  - PPI PO BID  - propranolol 20 mg bid   - repeat endoscopy in 3-4 weeks to eval varices.   - would restart home lactulose dose  - check an MR hepatocyte phase w Eovist to rule out HCC Impression:  79yo M with EtOH abuse, alcoholic cirrhosis, HTN, distant hx Guillain-[place], chronic back pain, admitted to [facility] for lower abd pain and chest pain, transferred to [facility] for GIB.    Problem List:  1) Acute variceal bleed now s/p EBL of esophageal varices: clinically resolved. s/p course of octreotide/abx.  2) Possible HCC seen on MR: reviewed w radiology. Inconclusive as the evaluation for contrast washout was limited.  3) Decompensated ETOH Cirrhosis, previously on transplant list but no longer - MELD-Na 15       - varices: present s/p EBL 5/27       - ascites: none on CT. Pt has a history of ascites       - SPE: none, on lactulose at home       - HCC: concerning lesion seen on MR   4) cholelithiasis  5) SMA stenosis   - right renal lesion    Plan:  - trend CBC  - low Na diet  - PPI PO BID  - propranolol 20 mg bid   - repeat endoscopy in 3-4 weeks to eval varices.   - would restart home lactulose dose  - check an MR hepatocyte phase w Eovist to rule out HCC  -check AFP/Ca19-9

## 2018-06-05 DIAGNOSIS — K76.9 LIVER DISEASE, UNSPECIFIED: ICD-10-CM

## 2018-06-05 LAB
AFP-TM SERPL-MCNC: 6.2 NG/ML — SIGNIFICANT CHANGE UP
BUN SERPL-MCNC: 29 MG/DL — HIGH (ref 7–23)
CALCIUM SERPL-MCNC: 8.3 MG/DL — LOW (ref 8.4–10.5)
CHLORIDE SERPL-SCNC: 101 MMOL/L — SIGNIFICANT CHANGE UP (ref 98–107)
CO2 SERPL-SCNC: 27 MMOL/L — SIGNIFICANT CHANGE UP (ref 22–31)
CREAT SERPL-MCNC: 1.23 MG/DL — SIGNIFICANT CHANGE UP (ref 0.5–1.3)
GLUCOSE SERPL-MCNC: 136 MG/DL — HIGH (ref 70–99)
HCT VFR BLD CALC: 27.2 % — LOW (ref 39–50)
HGB BLD-MCNC: 8.7 G/DL — LOW (ref 13–17)
MAGNESIUM SERPL-MCNC: 1.8 MG/DL — SIGNIFICANT CHANGE UP (ref 1.6–2.6)
MCHC RBC-ENTMCNC: 28.3 PG — SIGNIFICANT CHANGE UP (ref 27–34)
MCHC RBC-ENTMCNC: 32 % — SIGNIFICANT CHANGE UP (ref 32–36)
MCV RBC AUTO: 88.6 FL — SIGNIFICANT CHANGE UP (ref 80–100)
NRBC # FLD: 0 — SIGNIFICANT CHANGE UP
PHOSPHATE SERPL-MCNC: 2.2 MG/DL — LOW (ref 2.5–4.5)
PLATELET # BLD AUTO: 243 K/UL — SIGNIFICANT CHANGE UP (ref 150–400)
PMV BLD: 10.9 FL — SIGNIFICANT CHANGE UP (ref 7–13)
POTASSIUM SERPL-MCNC: 4.6 MMOL/L — SIGNIFICANT CHANGE UP (ref 3.5–5.3)
POTASSIUM SERPL-SCNC: 4.6 MMOL/L — SIGNIFICANT CHANGE UP (ref 3.5–5.3)
RBC # BLD: 3.07 M/UL — LOW (ref 4.2–5.8)
RBC # FLD: 17 % — HIGH (ref 10.3–14.5)
SODIUM SERPL-SCNC: 138 MMOL/L — SIGNIFICANT CHANGE UP (ref 135–145)
WBC # BLD: 8.07 K/UL — SIGNIFICANT CHANGE UP (ref 3.8–10.5)
WBC # FLD AUTO: 8.07 K/UL — SIGNIFICANT CHANGE UP (ref 3.8–10.5)

## 2018-06-05 PROCEDURE — 99233 SBSQ HOSP IP/OBS HIGH 50: CPT

## 2018-06-05 PROCEDURE — 76775 US EXAM ABDO BACK WALL LIM: CPT | Mod: 26

## 2018-06-05 PROCEDURE — 99232 SBSQ HOSP IP/OBS MODERATE 35: CPT | Mod: GC

## 2018-06-05 RX ORDER — SODIUM,POTASSIUM PHOSPHATES 278-250MG
1 POWDER IN PACKET (EA) ORAL
Qty: 0 | Refills: 0 | Status: COMPLETED | OUTPATIENT
Start: 2018-06-05 | End: 2018-06-06

## 2018-06-05 RX ORDER — LACTULOSE 10 G/15ML
10 SOLUTION ORAL THREE TIMES A DAY
Qty: 0 | Refills: 0 | Status: DISCONTINUED | OUTPATIENT
Start: 2018-06-05 | End: 2018-06-07

## 2018-06-05 RX ADMIN — PANTOPRAZOLE SODIUM 40 MILLIGRAM(S): 20 TABLET, DELAYED RELEASE ORAL at 05:29

## 2018-06-05 RX ADMIN — Medication 1 TABLET(S): at 13:46

## 2018-06-05 RX ADMIN — LACTULOSE 10 GRAM(S): 10 SOLUTION ORAL at 20:47

## 2018-06-05 RX ADMIN — Medication 100 MILLIGRAM(S): at 13:47

## 2018-06-05 RX ADMIN — Medication 100 MILLIGRAM(S): at 05:29

## 2018-06-05 RX ADMIN — Medication 100 MILLIGRAM(S): at 20:48

## 2018-06-05 RX ADMIN — LIDOCAINE 1 PATCH: 4 CREAM TOPICAL at 13:47

## 2018-06-05 RX ADMIN — Medication 3 MILLIGRAM(S): at 20:48

## 2018-06-05 RX ADMIN — Medication 1 TABLET(S): at 17:10

## 2018-06-05 RX ADMIN — PANTOPRAZOLE SODIUM 40 MILLIGRAM(S): 20 TABLET, DELAYED RELEASE ORAL at 17:10

## 2018-06-05 RX ADMIN — Medication 1 TABLET(S): at 20:48

## 2018-06-05 RX ADMIN — LACTULOSE 10 GRAM(S): 10 SOLUTION ORAL at 15:40

## 2018-06-05 NOTE — PROGRESS NOTE ADULT - ASSESSMENT
78M with PMH of alcoholic cirrhosis, HTN, chronic back pain presenting with acute blood loss anemia, transferred from [facility] at the [facility] to [facility] for acute variceal bleed, now s/p EBL. Now undergoing work-up for incidental liver lesion visualized on imaging, concern for HCC. 78M with alcoholic cirrhosis, HTN presenting from [facility] for management of acute GI bleed. Patient initially presented to [facility] with chest and abdominal pain, developed worsening CP and Afib with RVR and was started on full-dose AC for possible ACS but subsequently had coffee-ground emesis, BRBPR, and hypotension. Transferred to [facility] for management of acute variceal bleed s/p multiple blood products and variceal banding. Found to have liver lesion concerning for HCC, now undergoing w/u.

## 2018-06-05 NOTE — PROGRESS NOTE ADULT - PROBLEM SELECTOR PLAN 1
Found to have variceal bleed on emergent EGD, s/p banding x2 as well as course of octreotide gtt, PPI gtt, ceftriaxone (5 days total), 5 units PRBCs, 4 units plts, 4 units FFP. H/H remains stable.   - Monitor CBC daily  - Continue PPI PO BID  - Repeat endoscopy in 3-4 weeks to evaluate varices as per GI Found to have variceal bleed on emergent EGD s/p banding as well as course of octreotide gtt, PPI gtt, ceftriaxone (5 days total), 5 units PRBCs, 4 units plts, 4 units FFP. H/H remains stable.   - Monitor CBC daily  - Continue PPI PO BID  - Repeat endoscopy in 3-4 weeks to evaluate varices as per GI

## 2018-06-05 NOTE — PROGRESS NOTE ADULT - PROBLEM SELECTOR PLAN 4
Found to have 2.5 cm irregular hypodensity on lower pole of R kidney on MRCP unclear if infection vs. neoplasm.   - Renal ultrasound ordered and pending for further evaluation Found to have two areas of hypodensity on R and L kidney on MRCP, unclear if infection vs. neoplasm.   - Renal ultrasound ordered and pending

## 2018-06-05 NOTE — PROGRESS NOTE ADULT - PROBLEM SELECTOR PLAN 3
plan to do MR with eovist Found to have 2.4cm liver lesion on MRI concerning for neoplasm.   - Hepatocyte phase MRI with Eovist ordered and pending  - Will also check AFP,  as per GI

## 2018-06-05 NOTE — PROGRESS NOTE ADULT - SUBJECTIVE AND OBJECTIVE BOX
Internal Medicine Team 1 Progress Note    Anjana Garcia MD PGY1  Pager:  [phone] | [phone]    KETAN TIPTON  Patient is a 78y old  Male who presents with a chief complaint of GI bleed (03 Jun 2018 20:17)    INTERVAL HPI / SUBJECTIVE:      REVIEW OF SYSTEMS:   Constitutional: no fatigue, fever, chills, generalized weakness  HEENT: no eye pain, vision changes, rhinorrhea, sore throat  Respiratory: no cough, wheezing, shortness of breath  CV: no chest pain, palpitations, dyspnea on exertion, orthopnea, PND  GI: no decreased appetite, nausea, vomiting, abdominal pain, diarrhea, constipation, melena  : no dysuria, hematuria, urinary frequency, incontinence, nocturia  MSK: no joint or muscle pain, muscle weakness, joint swelling  Skin: no rashes, bruising, hair loss, color change  Neuro: no headache, dizziness, fainting, paresthesias, memory loss  Endo: no heat or cold intolerance, increased thirst, hot flashes  Psych: no changes in mood    MEDICATIONS:   MEDICATIONS  (STANDING):  benzonatate 100 milliGRAM(s) Oral three times a day  lidocaine   Patch 1 Patch Transdermal every other day  melatonin 3 milliGRAM(s) Oral at bedtime  pantoprazole    Tablet 40 milliGRAM(s) Oral two times a day  propranolol 20 milliGRAM(s) Oral two times a day    MEDICATIONS  (PRN):  guaiFENesin    Syrup 100 milliGRAM(s) Oral every 6 hours PRN Cough    ALLERGIES:   No Known Allergies    OBJECTIVE:  Vital Signs Last 24 Hrs  T(C): 36.6 (05 Jun 2018 05:19), Max: 37.3 (04 Jun 2018 22:00)  T(F): 97.9 (05 Jun 2018 05:19), Max: 99.1 (04 Jun 2018 22:00)  HR: 56 (05 Jun 2018 05:19) (56 - 85)  BP: 109/57 (05 Jun 2018 05:19) (99/51 - 118/61)  BP(mean): --  RR: 17 (05 Jun 2018 05:19) (17 - 18)  SpO2: 98% (05 Jun 2018 05:19) (97% - 99%)    I&O's Summary    04 Jun 2018 07:01  -  05 Jun 2018 07:00  --------------------------------------------------------  IN: 200 mL / OUT: 550 mL / NET: -350 mL    PHYSICAL EXAM:  General: Well-appearing, NAD  HEENT:  EOMI, PERRL, conjunctiva and sclera clear, normal oropharynx  Neck:  Supple, no JVD, normal thyroid  Chest/Lungs: CTA B/L, no rales, rhonchi, rub or wheezing  Heart: RRR, normal S1, S2, no murmurs or gallops  Abdomen: Soft, nondistended, NTTP, normoactive bowel sounds  Extremities: Peripheral pulses 2+ B/L, no edema, cyanosis or clubbing  Skin: Warm, well-perfused, no rashes or lesions  Neurological: A&Ox3, no focal deficits      LABS:      IMAGING:       Labs and imaging personally reviewed and interpreted [  ]  Consult notes reviewed [  ]  Case discussed with consultants [  ] Internal Medicine Team 1 Progress Note    Anjana Garcia MD PGY1  Pager:  [phone] | [phone]    KETAN TIPTON  Patient is a 78y old  Male who presents with a chief complaint of GI bleed (03 Jun 2018 20:17)    INTERVAL HPI / SUBJECTIVE:  No events overnight. This morning, patient assessed at bedside. Reports he is feeling well. States he is constipated.     REVIEW OF SYSTEMS:   Constitutional: no fatigue, fever, chills, generalized weakness  HEENT: negative  Respiratory: no cough, wheezing, shortness of breath  CV: no chest pain, palpitations, dyspnea on exertion, orthopnea, PND  GI: +constipation, nausea, vomiting, abdominal pain, diarrhea, melena  : no dysuria, hematuria, urinary frequency, incontinence  MSK: no joint or muscle pain, muscle weakness, joint swelling  Skin: no rashes, bruising  Neuro: no headache, dizziness, fainting, paresthesias    MEDICATIONS:   MEDICATIONS  (STANDING):  benzonatate 100 milliGRAM(s) Oral three times a day  lidocaine   Patch 1 Patch Transdermal every other day  melatonin 3 milliGRAM(s) Oral at bedtime  pantoprazole    Tablet 40 milliGRAM(s) Oral two times a day  propranolol 20 milliGRAM(s) Oral two times a day    MEDICATIONS  (PRN):  guaiFENesin    Syrup 100 milliGRAM(s) Oral every 6 hours PRN Cough    ALLERGIES:   No Known Allergies    OBJECTIVE:  Vital Signs Last 24 Hrs  T(C): 36.6 (05 Jun 2018 05:19), Max: 37.3 (04 Jun 2018 22:00)  T(F): 97.9 (05 Jun 2018 05:19), Max: 99.1 (04 Jun 2018 22:00)  HR: 56 (05 Jun 2018 05:19) (56 - 85)  BP: 109/57 (05 Jun 2018 05:19) (99/51 - 118/61)  BP(mean): --  RR: 17 (05 Jun 2018 05:19) (17 - 18)  SpO2: 98% (05 Jun 2018 05:19) (97% - 99%)    I&O's Summary    04 Jun 2018 07:01  -  05 Jun 2018 07:00  --------------------------------------------------------  IN: 200 mL / OUT: 550 mL / NET: -350 mL    PHYSICAL EXAM:  General: Well-appearing, NAD  HEENT:  EOMI, PERRL, conjunctiva and sclera clear, normal oropharynx  Neck:  Supple, no JVD  Chest/Lungs: CTA B/L, no rales, rhonchi, rub or wheezing  Heart: RRR, normal S1, S2, no murmurs or gallops  Abdomen: Soft, mildly distended, NTTP, normoactive bowel sounds  Extremities: Peripheral pulses 2+ B/L, no edema, cyanosis or clubbing  Skin: Warm, well-perfused, no rashes or lesions  Neurological: A&Ox3, no focal deficits    LABS:  CBC Full  -  ( 05 Jun 2018 06:55 )  WBC Count : 8.07 K/uL  Hemoglobin : 8.7 g/dL  Hematocrit : 27.2 %  Platelet Count - Automated : 243 K/uL  Mean Cell Volume : 88.6 fL  Mean Cell Hemoglobin : 28.3 pg  Mean Cell Hemoglobin Concentration : 32.0 %    06-05    138  |  101  |  29<H>  ----------------------------<  136<H>  4.6   |  27  |  1.23    Ca    8.3<L>      05 Jun 2018 06:55  Phos  2.2     06-05  Mg     1.8     06-05    IMAGING: Pending    Labs and imaging personally reviewed and interpreted [ x ]  Consult notes reviewed [ x ]  Case discussed with consultants [ x ]

## 2018-06-05 NOTE — PROGRESS NOTE ADULT - PROBLEM SELECTOR PLAN 2
2/2 EtOH abuse. Viral hepatitis panel negative. Underwent MRCP 6/2 showing 2.4 cm lesion of liver dome which may represent HCC but will need MRI to confirm.   - Continue home lactulose  - Continue propranolol 20mg BID 2/2 EtOH abuse. Viral hepatitis panel negative. Underwent MRCP 6/2 showing 2.4 cm lesion of liver dome which may represent HCC but will need MRI to confirm.   - Continue home lactulose  - Continue propranolol 20mg BID  - Will order MELD labs for AM

## 2018-06-05 NOTE — PROGRESS NOTE ADULT - SUBJECTIVE AND OBJECTIVE BOX
Patient is a 78y old  Male who presents with a chief complaint of GI bleed (03 Jun 2018 20:17)      SUBJECTIVE / OVERNIGHT EVENTS:  no BM in a few days  MEDICATIONS  (STANDING):  benzonatate 100 milliGRAM(s) Oral three times a day  lidocaine   Patch 1 Patch Transdermal every other day  melatonin 3 milliGRAM(s) Oral at bedtime  pantoprazole    Tablet 40 milliGRAM(s) Oral two times a day  propranolol 20 milliGRAM(s) Oral two times a day    MEDICATIONS  (PRN):  guaiFENesin    Syrup 100 milliGRAM(s) Oral every 6 hours PRN Cough              PHYSICAL EXAM:  GENERAL: NAD, well-developed  HEAD:  Atraumatic, Normocephalic  EYES: EOMI, PERRLA, conjunctiva and sclera anicteric  NECK: Supple, No JVD  CHEST/LUNG: Clear to auscultation bilaterally; No wheeze  HEART: Regular rate and rhythm; No murmurs, rubs, or gallops  ABDOMEN: Soft, Nontender, Nondistended; Bowel sounds present, no hepatosplenomegaly, no rebound or guarding  EXTREMITIES:  2+ Peripheral Pulses, No clubbing, cyanosis, or edema  PSYCH: AAOx3  NEUROLOGY: non-focal, no asterixis  SKIN: No rashes or lesion    LABS:                        8.7    8.07  )-----------( 243      ( 05 Jun 2018 06:55 )             27.2     06-04    137  |  101  |  26<H>  ----------------------------<  115<H>  4.5   |  25  |  1.13    Ca    8.2<L>      04 Jun 2018 06:30  Phos  3.0     06-04  Mg     1.8     06-04                  RADIOLOGY & ADDITIONAL TESTS:

## 2018-06-05 NOTE — PROGRESS NOTE ADULT - PROBLEM SELECTOR PLAN 6
DVT ppx: holding chemical ppx in setting of GI bleed  Soft diet with ensure supplementation DVT ppx: SCDs  Soft diet with ensure supplementation

## 2018-06-05 NOTE — PROGRESS NOTE ADULT - ASSESSMENT
Impression:  79yo M with EtOH abuse, alcoholic cirrhosis, HTN, distant hx Guillain-[place], chronic back pain, admitted to [facility] for lower abd pain and chest pain, transferred to [facility] for GIB.    Problem List:  1) Acute variceal bleed now s/p EBL of esophageal varices: clinically resolved. s/p course of octreotide/abx.  2) Possible HCC seen on MR: reviewed w radiology. Inconclusive as the evaluation for contrast washout was limited.  3) Decompensated ETOH Cirrhosis, previously on transplant list but no longer - MELD-Na 15 (last MELD labs 5/31)       - varices: present s/p EBL 5/27       - ascites: none on CT. Pt has a history of ascites       - encephalopathy: none, on lactulose at home       - HCC: concerning lesion seen on MR   4) cholelithiasis  5) SMA stenosis       Plan:  - trend MELD labs  - low Na diet  - PPI PO BID  - propranolol 20 mg bid   - repeat endoscopy in 3-4 weeks to eval varices.   - restart lactulose 10g BID goal 2 BM per day  - check an MR hepatocyte phase w Eovist to rule out HCC  -check AFP/Ca19-9 Impression:  79yo M with EtOH abuse, alcoholic cirrhosis, HTN, distant hx Guillain-[place], chronic back pain, admitted to [facility] for lower abd pain and chest pain, transferred to [facility] for GIB.    Problem List:  1) Acute variceal bleed now s/p EBL of esophageal varices: clinically resolved. s/p course of octreotide/abx.  2) Possible HCC seen on MR: reviewed w radiology. Inconclusive as the evaluation for contrast washout was limited.  3) Decompensated ETOH Cirrhosis, previously on transplant list but no longer - MELD-Na 15 (last MELD labs 5/31)       - varices: present s/p EBL 5/27       - ascites: none on CT. Pt has a history of ascites       - encephalopathy: none, on lactulose at home       - HCC: concerning lesion seen on MR   4) cholelithiasis  5) SMA stenosis       Plan:  - trend MELD labs  - low Na diet  - PPI PO BID  - propranolol 20 mg bid   - repeat endoscopy in 3-4 weeks to eval varices.   - restart lactulose 10g QHS  - check an MR hepatocyte phase w Eovist to rule out HCC  -check AFP/Ca19-9

## 2018-06-06 LAB
ALBUMIN SERPL ELPH-MCNC: 2.6 G/DL — LOW (ref 3.3–5)
ALP SERPL-CCNC: 143 U/L — HIGH (ref 40–120)
ALT FLD-CCNC: 21 U/L — SIGNIFICANT CHANGE UP (ref 4–41)
AST SERPL-CCNC: 36 U/L — SIGNIFICANT CHANGE UP (ref 4–40)
BILIRUB SERPL-MCNC: 0.4 MG/DL — SIGNIFICANT CHANGE UP (ref 0.2–1.2)
BUN SERPL-MCNC: 26 MG/DL — HIGH (ref 7–23)
CALCIUM SERPL-MCNC: 8.2 MG/DL — LOW (ref 8.4–10.5)
CANCER AG19-9 SERPL-ACNC: < 1 U/ML — SIGNIFICANT CHANGE UP
CHLORIDE SERPL-SCNC: 99 MMOL/L — SIGNIFICANT CHANGE UP (ref 98–107)
CO2 SERPL-SCNC: 27 MMOL/L — SIGNIFICANT CHANGE UP (ref 22–31)
CREAT SERPL-MCNC: 1.24 MG/DL — SIGNIFICANT CHANGE UP (ref 0.5–1.3)
GLUCOSE SERPL-MCNC: 138 MG/DL — HIGH (ref 70–99)
HCT VFR BLD CALC: 25.7 % — LOW (ref 39–50)
HGB BLD-MCNC: 8.2 G/DL — LOW (ref 13–17)
INR BLD: 1.46 — HIGH (ref 0.88–1.17)
MAGNESIUM SERPL-MCNC: 1.7 MG/DL — SIGNIFICANT CHANGE UP (ref 1.6–2.6)
MCHC RBC-ENTMCNC: 28.2 PG — SIGNIFICANT CHANGE UP (ref 27–34)
MCHC RBC-ENTMCNC: 31.9 % — LOW (ref 32–36)
MCV RBC AUTO: 88.3 FL — SIGNIFICANT CHANGE UP (ref 80–100)
NRBC # FLD: 0 — SIGNIFICANT CHANGE UP
PHOSPHATE SERPL-MCNC: 3.1 MG/DL — SIGNIFICANT CHANGE UP (ref 2.5–4.5)
PLATELET # BLD AUTO: 248 K/UL — SIGNIFICANT CHANGE UP (ref 150–400)
PMV BLD: 11 FL — SIGNIFICANT CHANGE UP (ref 7–13)
POTASSIUM SERPL-MCNC: 4.2 MMOL/L — SIGNIFICANT CHANGE UP (ref 3.5–5.3)
POTASSIUM SERPL-SCNC: 4.2 MMOL/L — SIGNIFICANT CHANGE UP (ref 3.5–5.3)
PROT SERPL-MCNC: 6.2 G/DL — SIGNIFICANT CHANGE UP (ref 6–8.3)
PROTHROM AB SERPL-ACNC: 16.9 SEC — HIGH (ref 9.8–13.1)
RBC # BLD: 2.91 M/UL — LOW (ref 4.2–5.8)
RBC # FLD: 17.1 % — HIGH (ref 10.3–14.5)
SODIUM SERPL-SCNC: 139 MMOL/L — SIGNIFICANT CHANGE UP (ref 135–145)
WBC # BLD: 8.06 K/UL — SIGNIFICANT CHANGE UP (ref 3.8–10.5)
WBC # FLD AUTO: 8.06 K/UL — SIGNIFICANT CHANGE UP (ref 3.8–10.5)

## 2018-06-06 PROCEDURE — 99232 SBSQ HOSP IP/OBS MODERATE 35: CPT | Mod: GC

## 2018-06-06 PROCEDURE — 99233 SBSQ HOSP IP/OBS HIGH 50: CPT

## 2018-06-06 RX ADMIN — LACTULOSE 10 GRAM(S): 10 SOLUTION ORAL at 12:11

## 2018-06-06 RX ADMIN — Medication 100 MILLIGRAM(S): at 12:12

## 2018-06-06 RX ADMIN — Medication 1 TABLET(S): at 08:07

## 2018-06-06 RX ADMIN — Medication 100 MILLIGRAM(S): at 21:12

## 2018-06-06 RX ADMIN — PANTOPRAZOLE SODIUM 40 MILLIGRAM(S): 20 TABLET, DELAYED RELEASE ORAL at 05:33

## 2018-06-06 RX ADMIN — PANTOPRAZOLE SODIUM 40 MILLIGRAM(S): 20 TABLET, DELAYED RELEASE ORAL at 16:52

## 2018-06-06 RX ADMIN — Medication 3 MILLIGRAM(S): at 21:12

## 2018-06-06 RX ADMIN — Medication 20 MILLIGRAM(S): at 05:33

## 2018-06-06 RX ADMIN — LACTULOSE 10 GRAM(S): 10 SOLUTION ORAL at 21:12

## 2018-06-06 RX ADMIN — Medication 100 MILLIGRAM(S): at 05:33

## 2018-06-06 RX ADMIN — LACTULOSE 10 GRAM(S): 10 SOLUTION ORAL at 05:33

## 2018-06-06 RX ADMIN — LIDOCAINE 1 PATCH: 4 CREAM TOPICAL at 00:56

## 2018-06-06 NOTE — PROGRESS NOTE ADULT - PROBLEM SELECTOR PLAN 2
2/2 EtOH abuse. Viral hepatitis panel negative. Underwent MRCP 6/2 showing 2.4 cm lesion of liver dome which may represent HCC but will need MRI to confirm.   - Continue home lactulose  - Continue propranolol 20mg BID  - MELD   - MRI this morning, will f/u results 2/2 EtOH abuse. Viral hepatitis panel negative. Underwent MRCP 6/2 showing 2.4 cm lesion of liver dome which may represent HCC but will need MRI to confirm.   - Continue home lactulose  - Continue propranolol 20mg BID  - MELD score 13  - MRI this morning, will f/u results

## 2018-06-06 NOTE — PROGRESS NOTE ADULT - ASSESSMENT
78M with alcoholic cirrhosis, HTN presenting from [facility] for management of acute GI bleed. Patient initially presented to [facility] with chest and abdominal pain, developed worsening CP and Afib with RVR and was started on full-dose AC for possible ACS but subsequently had coffee-ground emesis, BRBPR, and hypotension. Transferred to [facility] MICU for management of acute variceal bleed s/p multiple blood products and variceal banding. Found to have liver lesion concerning for HCC, now undergoing w/u.

## 2018-06-06 NOTE — PROGRESS NOTE ADULT - PROBLEM SELECTOR PLAN 4
Found to have two areas of hypodensity on R and L kidney on MRCP, unclear if infection vs. neoplasm. Renal US 6/5 shows resolution of lesions.   - No further work-up

## 2018-06-06 NOTE — PROGRESS NOTE ADULT - SUBJECTIVE AND OBJECTIVE BOX
Internal Medicine Team Progress Note    Anjana Garcia MD PGY1  Pager:  [phone] | [phone]    KETAN TIPTON  Patient is a 78y old  Male who presents with a chief complaint of GI bleed (03 Jun 2018 20:17)    INTERVAL HPI / SUBJECTIVE:  No overnight events. Feels well today.     REVIEW OF SYSTEMS:   Constitutional: no fatigue, fever, chills, generalized weakness  HEENT: no eye pain, vision changes, rhinorrhea, sore throat  Respiratory: no cough, wheezing, shortness of breath  CV: no chest pain, palpitations, dyspnea on exertion, orthopnea, PND  GI: no decreased appetite, nausea, vomiting, abdominal pain, diarrhea, constipation, melena  : no dysuria, hematuria, urinary frequency, incontinence, nocturia  MSK: no joint or muscle pain, muscle weakness, joint swelling  Skin: no rashes, bruising, hair loss, color change  Neuro: no headache, dizziness, fainting, paresthesias, memory loss  Endo: no heat or cold intolerance, increased thirst, hot flashes  Psych: no changes in mood      MEDICATIONS:   MEDICATIONS  (STANDING):  benzonatate 100 milliGRAM(s) Oral three times a day  lactulose Syrup 10 Gram(s) Oral three times a day  lidocaine   Patch 1 Patch Transdermal every other day  melatonin 3 milliGRAM(s) Oral at bedtime  pantoprazole    Tablet 40 milliGRAM(s) Oral two times a day  propranolol 20 milliGRAM(s) Oral two times a day    MEDICATIONS  (PRN):  guaiFENesin    Syrup 100 milliGRAM(s) Oral every 6 hours PRN Cough    ALLERGIES:   No Known Allergies      OBJECTIVE:  Vital Signs Last 24 Hrs  T(C): 36.9 (06 Jun 2018 05:16), Max: 36.9 (05 Jun 2018 12:59)  T(F): 98.4 (06 Jun 2018 05:16), Max: 98.4 (05 Jun 2018 12:59)  HR: 63 (06 Jun 2018 05:16) (57 - 88)  BP: 112/46 (06 Jun 2018 05:16) (102/52 - 125/49)  BP(mean): --  RR: 18 (06 Jun 2018 05:16) (18 - 18)  SpO2: 97% (06 Jun 2018 05:16) (96% - 98%)    I&O's Summary    05 Jun 2018 07:01  -  06 Jun 2018 07:00  --------------------------------------------------------  IN: 500 mL / OUT: 1800 mL / NET: -1300 mL      PHYSICAL EXAM:  General: Well-appearing, NAD  HEENT:  EOMI, PERRL, conjunctiva and sclera clear, normal oropharynx  Neck:  Supple, no JVD, normal thyroid  Chest/Lungs: CTA B/L, no rales, rhonchi, rub or wheezing  Heart: RRR, normal S1, S2, no murmurs or gallops  Abdomen: Soft, moderately distended, NTTP, normoactive bowel sounds  Extremities: Peripheral pulses 2+ B/L, no edema, cyanosis or clubbing  Skin: Warm, well-perfused, no rashes or lesions  Neurological: A&Ox3, no focal deficits    LABS:  CBC Full  -  ( 06 Jun 2018 05:20 )  WBC Count : 8.06 K/uL  Hemoglobin : 8.2 g/dL  Hematocrit : 25.7 %  Platelet Count - Automated : 248 K/uL  Mean Cell Volume : 88.3 fL  Mean Cell Hemoglobin : 28.2 pg  Mean Cell Hemoglobin Concentration : 31.9 %    06-06    139  |  99  |  26<H>  ----------------------------<  138<H>  4.2   |  27  |  1.24    Ca    8.2<L>      06 Jun 2018 05:20  Phos  3.1     06-06  Mg     1.7     06-06    TPro  6.2  /  Alb  2.6<L>  /  TBili  0.4  /  DBili  x   /  AST  36  /  ALT  21  /  AlkPhos  143<H>  06-06    LIVER FUNCTIONS - ( 06 Jun 2018 05:20 )  Alb: 2.6 g/dL / Pro: 6.2 g/dL / ALK PHOS: 143 u/L / ALT: 21 u/L / AST: 36 u/L / GGT: x           PT/INR - ( 06 Jun 2018 05:20 )   PT: 16.9 SEC;   INR: 1.46         IMAGING:   < from: US Renal (06.05.18 @ 09:14) >  FINDINGS:  Right kidney:  10 cm. No renal mass, hydronephrosis or calculi.  Left kidney:  10 cm. No renal mass, hydronephrosis or calculi.  Urinary bladder: Within normal limits.  IMPRESSION:   The previously noted renal lesions are not visualized.      Labs and imaging personally reviewed and interpreted [ x ]  Consult notes reviewed [ x ]  Case discussed with consultants [ x ]

## 2018-06-06 NOTE — PROGRESS NOTE ADULT - PROBLEM SELECTOR PLAN 1
Found to have variceal bleed on emergent EGD s/p banding as well as course of octreotide gtt, PPI gtt, ceftriaxone (5 days total), 5 units PRBCs, 4 units plts, 4 units FFP. H/H remains stable.   - Monitor CBC daily  - Continue PPI PO BID  - Repeat endoscopy in 3-4 weeks to evaluate varices as per GI

## 2018-06-06 NOTE — PROGRESS NOTE ADULT - PROBLEM SELECTOR PLAN 3
Found to have 2.4cm liver lesion on MRI concerning for neoplasm.   - Hepatocyte phase MRI results pending  - AFP wnl  -  pending

## 2018-06-06 NOTE — PROGRESS NOTE ADULT - ASSESSMENT
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    Problem List:  1) Acute variceal bleed now s/p EBL of esophageal varices: clinically resolved. s/p course of octreotide/abx.  2) Possible HCC seen on MR: reviewed w radiology. Inconclusive as the evaluation for contrast washout was limited. AFP unremarkable.  3) Decompensated ETOH Cirrhosis, previously on transplant list but no longer - MELD-Na 15       - varices: present s/p EBL 5/27       - ascites: none on CT. Pt has a history of ascites       - encephalopathy: none, on lactulose        - HCC: concerning lesion seen on MR  4) cholelithiasis  5) increase in ALP today: unclear etiology, bile ducts normal on recent MR. no obvious culprit medications.      Plan:  - trend MELD labs  - low Na diet  - PPI PO BID  - propranolol 20 mg bid   - repeat endoscopy in 3-4 weeks to eval varices.   - restart lactulose 10g QHS  - check an MR hepatocyte phase w Eovist to rule out HCC  - f/u Ca19-9

## 2018-06-06 NOTE — PROGRESS NOTE ADULT - SUBJECTIVE AND OBJECTIVE BOX
Patient is a 78y old  Male who presents with a chief complaint of GI bleed (03 Jun 2018 20:17)      SUBJECTIVE / OVERNIGHT EVENTS:  had a BM w lactulose  MEDICATIONS  (STANDING):  benzonatate 100 milliGRAM(s) Oral three times a day  lactulose Syrup 10 Gram(s) Oral three times a day  lidocaine   Patch 1 Patch Transdermal every other day  melatonin 3 milliGRAM(s) Oral at bedtime  pantoprazole    Tablet 40 milliGRAM(s) Oral two times a day  propranolol 20 milliGRAM(s) Oral two times a day    MEDICATIONS  (PRN):  guaiFENesin    Syrup 100 milliGRAM(s) Oral every 6 hours PRN Cough              PHYSICAL EXAM:  GENERAL: NAD, well-developed  HEAD:  Atraumatic, Normocephalic  EYES: EOMI, PERRLA, conjunctiva and sclera anicteric  NECK: Supple, No JVD  CHEST/LUNG: Clear to auscultation bilaterally; No wheeze  HEART: Regular rate and rhythm; No murmurs, rubs, or gallops  ABDOMEN: Soft, Nontender, Nondistended; Bowel sounds present, no hepatosplenomegaly, no rebound or guarding  EXTREMITIES:  2+ Peripheral Pulses, No clubbing, cyanosis, or edema  PSYCH: AAOx3  NEUROLOGY: non-focal, no asterixis  SKIN: No rashes or lesion    LABS:                        8.2    8.06  )-----------( 248      ( 06 Jun 2018 05:20 )             25.7     06-06    139  |  99  |  26<H>  ----------------------------<  138<H>  4.2   |  27  |  1.24    Ca    8.2<L>      06 Jun 2018 05:20  Phos  3.1     06-06  Mg     1.7     06-06    TPro  6.2  /  Alb  2.6<L>  /  TBili  0.4  /  DBili  x   /  AST  36  /  ALT  21  /  AlkPhos  143<H>  06-06    LIVER FUNCTIONS - ( 06 Jun 2018 05:20 )  Alb: 2.6 g/dL / Pro: 6.2 g/dL / ALK PHOS: 143 u/L / ALT: 21 u/L / AST: 36 u/L / GGT: x           PT/INR - ( 06 Jun 2018 05:20 )   PT: 16.9 SEC;   INR: 1.46                    RADIOLOGY & ADDITIONAL TESTS:

## 2018-06-07 VITALS
HEART RATE: 68 BPM | TEMPERATURE: 98 F | RESPIRATION RATE: 17 BRPM | SYSTOLIC BLOOD PRESSURE: 141 MMHG | DIASTOLIC BLOOD PRESSURE: 81 MMHG | OXYGEN SATURATION: 96 %

## 2018-06-07 LAB
ALBUMIN SERPL ELPH-MCNC: 2.8 G/DL — LOW (ref 3.3–5)
ALP SERPL-CCNC: 140 U/L — HIGH (ref 40–120)
ALT FLD-CCNC: 21 U/L — SIGNIFICANT CHANGE UP (ref 4–41)
AST SERPL-CCNC: 33 U/L — SIGNIFICANT CHANGE UP (ref 4–40)
BILIRUB SERPL-MCNC: 0.6 MG/DL — SIGNIFICANT CHANGE UP (ref 0.2–1.2)
BUN SERPL-MCNC: 26 MG/DL — HIGH (ref 7–23)
CALCIUM SERPL-MCNC: 8.5 MG/DL — SIGNIFICANT CHANGE UP (ref 8.4–10.5)
CHLORIDE SERPL-SCNC: 101 MMOL/L — SIGNIFICANT CHANGE UP (ref 98–107)
CO2 SERPL-SCNC: 28 MMOL/L — SIGNIFICANT CHANGE UP (ref 22–31)
CREAT SERPL-MCNC: 1.13 MG/DL — SIGNIFICANT CHANGE UP (ref 0.5–1.3)
GLUCOSE SERPL-MCNC: 105 MG/DL — HIGH (ref 70–99)
HCT VFR BLD CALC: 26.6 % — LOW (ref 39–50)
HGB BLD-MCNC: 8.3 G/DL — LOW (ref 13–17)
MAGNESIUM SERPL-MCNC: 1.9 MG/DL — SIGNIFICANT CHANGE UP (ref 1.6–2.6)
MCHC RBC-ENTMCNC: 28.8 PG — SIGNIFICANT CHANGE UP (ref 27–34)
MCHC RBC-ENTMCNC: 31.2 % — LOW (ref 32–36)
MCV RBC AUTO: 92.4 FL — SIGNIFICANT CHANGE UP (ref 80–100)
NRBC # FLD: 0 — SIGNIFICANT CHANGE UP
PHOSPHATE SERPL-MCNC: 3.1 MG/DL — SIGNIFICANT CHANGE UP (ref 2.5–4.5)
PLATELET # BLD AUTO: 245 K/UL — SIGNIFICANT CHANGE UP (ref 150–400)
PMV BLD: 11 FL — SIGNIFICANT CHANGE UP (ref 7–13)
POTASSIUM SERPL-MCNC: 4.2 MMOL/L — SIGNIFICANT CHANGE UP (ref 3.5–5.3)
POTASSIUM SERPL-SCNC: 4.2 MMOL/L — SIGNIFICANT CHANGE UP (ref 3.5–5.3)
PROT SERPL-MCNC: 6.3 G/DL — SIGNIFICANT CHANGE UP (ref 6–8.3)
RBC # BLD: 2.88 M/UL — LOW (ref 4.2–5.8)
RBC # FLD: 17.1 % — HIGH (ref 10.3–14.5)
SODIUM SERPL-SCNC: 138 MMOL/L — SIGNIFICANT CHANGE UP (ref 135–145)
WBC # BLD: 7.39 K/UL — SIGNIFICANT CHANGE UP (ref 3.8–10.5)
WBC # FLD AUTO: 7.39 K/UL — SIGNIFICANT CHANGE UP (ref 3.8–10.5)

## 2018-06-07 PROCEDURE — 99239 HOSP IP/OBS DSCHRG MGMT >30: CPT

## 2018-06-07 PROCEDURE — 99232 SBSQ HOSP IP/OBS MODERATE 35: CPT | Mod: GC

## 2018-06-07 PROCEDURE — 74182 MRI ABDOMEN W/CONTRAST: CPT | Mod: 26

## 2018-06-07 RX ORDER — PANTOPRAZOLE SODIUM 20 MG/1
1 TABLET, DELAYED RELEASE ORAL
Qty: 0 | Refills: 0 | DISCHARGE
Start: 2018-06-07

## 2018-06-07 RX ADMIN — LACTULOSE 10 GRAM(S): 10 SOLUTION ORAL at 06:37

## 2018-06-07 RX ADMIN — Medication 100 MILLIGRAM(S): at 13:23

## 2018-06-07 RX ADMIN — Medication 100 MILLIGRAM(S): at 01:01

## 2018-06-07 RX ADMIN — Medication 100 MILLIGRAM(S): at 06:37

## 2018-06-07 RX ADMIN — LIDOCAINE 1 PATCH: 4 CREAM TOPICAL at 13:23

## 2018-06-07 RX ADMIN — PANTOPRAZOLE SODIUM 40 MILLIGRAM(S): 20 TABLET, DELAYED RELEASE ORAL at 06:37

## 2018-06-07 RX ADMIN — LACTULOSE 10 GRAM(S): 10 SOLUTION ORAL at 13:23

## 2018-06-07 NOTE — PROGRESS NOTE ADULT - PROBLEM SELECTOR PLAN 3
Found to have 2.4cm liver lesion on MRI concerning for neoplasm.   - MRI as above  - AFP wnl,  pending  - Requests follow-up with hepatologist at [facility] upon discharge, will provide phone number for appointment in discharge paperwork

## 2018-06-07 NOTE — PROVIDER CONTACT NOTE (OTHER) - SITUATION
/48
HR less than 60
HR less than 60
heart rate 52
heart rate 54
heart rate 56
heart rate 58, bp 99/51
patient heart rate was 55 no distress was noted, patient has propranolol due at this time
patient heart rate was 57 no distress noted. patient has propranolol due at this time
patient heart rate was 58 no distress noted. patient sitting up in bed
patient heart rate was 59 no distress noted patient lying down comfortably
patient heart rate was 59 no distress noted. patient sitting up in bed talking with family

## 2018-06-07 NOTE — PROGRESS NOTE ADULT - PROBLEM SELECTOR PLAN 2
2/2 EtOH abuse. Viral hepatitis panel negative. Underwent MRCP 6/2 showing 2.4 cm lesion of liver dome which may represent HCC but will need MRI to confirm.   - Continue home lactulose  - Continue propranolol 20mg BID  - MELD score 13  - MRI this morning, will f/u results

## 2018-06-07 NOTE — PROGRESS NOTE ADULT - ASSESSMENT
Impression:  79yo M with EtOH abuse, alcoholic cirrhosis, HTN, distant hx Guillain-[place], chronic back pain, admitted to [facility] for lower abd pain and chest pain, transferred to [facility] for GIB.    Problem List:  1) Acute variceal bleed now s/p EBL of esophageal varices: clinically resolved. s/p course of octreotide/abx.  2) Possible HCC seen on MR: repeat MR still inconclusive  3) Decompensated ETOH Cirrhosis, previously on transplant list but no longer - MELD-Na 15       - varices: present s/p EBL 5/27       - ascites: none on CT. Pt has a history of ascites       - encephalopathy: none, on lactulose        - HCC: concerning lesion seen on MR, repeat MR inconclusive. 6 mo. follow up recommended.  4) cholelithiasis  5) increase in ALP today: unclear etiology, bile ducts normal on recent MR. no obvious culprit medications.      Plan:  - trend MELD labs  - low Na diet  - PPI PO BID  - propranolol 20 mg bid   - repeat endoscopy in 3-4 weeks to eval varices.   - restart lactulose 10g QHS  - repeat MRI in 3-6 months  - follow up with Dr. Richie Mcgraw in 2-4 weeks [phone] <[phone]>)

## 2018-06-07 NOTE — PROGRESS NOTE ADULT - SUBJECTIVE AND OBJECTIVE BOX
Internal Medicine Team 1 Progress Note    Anjana Garcia MD PGY1  Pager:  [phone] | [phone]    KETAN TIPTON  Patient is a 78y old  Male who presents with a chief complaint of GI bleed (03 Jun 2018 20:17)    INTERVAL HPI / SUBJECTIVE:      REVIEW OF SYSTEMS:   Constitutional: no fatigue, fever, chills, generalized weakness  HEENT: no eye pain, vision changes, rhinorrhea, sore throat  Respiratory: no cough, wheezing, shortness of breath  CV: no chest pain, palpitations, dyspnea on exertion, orthopnea, PND  GI: no decreased appetite, nausea, vomiting, abdominal pain, diarrhea, constipation, melena  : no dysuria, hematuria, urinary frequency, incontinence, nocturia  MSK: no joint or muscle pain, muscle weakness, joint swelling  Skin: no rashes, bruising, hair loss, color change  Neuro: no headache, dizziness, fainting, paresthesias, memory loss  Endo: no heat or cold intolerance, increased thirst, hot flashes  Psych: no changes in mood      MEDICATIONS:   MEDICATIONS  (STANDING):  benzonatate 100 milliGRAM(s) Oral three times a day  lactulose Syrup 10 Gram(s) Oral three times a day  lidocaine   Patch 1 Patch Transdermal every other day  melatonin 3 milliGRAM(s) Oral at bedtime  pantoprazole    Tablet 40 milliGRAM(s) Oral two times a day  propranolol 20 milliGRAM(s) Oral two times a day    MEDICATIONS  (PRN):  guaiFENesin    Syrup 100 milliGRAM(s) Oral every 6 hours PRN Cough    ALLERGIES:   No Known Allergies      OBJECTIVE:  Vital Signs Last 24 Hrs  T(C): 37.4 (07 Jun 2018 05:42), Max: 37.4 (07 Jun 2018 05:42)  T(F): 99.3 (07 Jun 2018 05:42), Max: 99.3 (07 Jun 2018 05:42)  HR: 60 (07 Jun 2018 05:42) (55 - 61)  BP: 110/50 (07 Jun 2018 05:42) (110/50 - 135/60)  BP(mean): --  RR: 18 (07 Jun 2018 05:42) (17 - 18)  SpO2: 99% (07 Jun 2018 05:42) (99% - 100%)    I&O's Summary    06 Jun 2018 07:01  -  07 Jun 2018 07:00  --------------------------------------------------------  IN: 400 mL / OUT: 400 mL / NET: 0 mL      PHYSICAL EXAM:  General: Well-appearing, NAD  HEENT:  EOMI, PERRL, conjunctiva and sclera clear, normal oropharynx  Neck:  Supple, no JVD, normal thyroid  Chest/Lungs: CTA B/L, no rales, rhonchi, rub or wheezing  Heart: RRR, normal S1, S2, no murmurs or gallops  Abdomen: Soft, nondistended, NTTP, normoactive bowel sounds  Extremities: Peripheral pulses 2+ B/L, no edema, cyanosis or clubbing  Skin: Warm, well-perfused, no rashes or lesions  Neurological: A&Ox3, no focal deficits    LABS:      IMAGING:       Labs and imaging personally reviewed and interpreted [ x ]  Consult notes reviewed [ x ]  Case discussed with consultants [ x ] Internal Medicine Team 1 Progress Note    Anjana Garcia MD PGY1  Pager:  [phone] | [phone]    KETAN TIPTON  Patient is a 78y old  Male who presents with a chief complaint of GI bleed (03 Jun 2018 20:17)    INTERVAL HPI / SUBJECTIVE:  No overnight events. Seen and assessed at bedside this morning, reports feeling "much better". States he is looking forward to going home and is motivated to stop drinking.     REVIEW OF SYSTEMS:   Constitutional: no fatigue, fever, chills, generalized weakness  HEENT: no eye pain, vision changes, rhinorrhea, sore throat  Respiratory: no cough, wheezing, shortness of breath  CV: no chest pain, palpitations, dyspnea on exertion, orthopnea, PND  GI: no decreased appetite, nausea, vomiting, abdominal pain, diarrhea, constipation, melena  : no dysuria, hematuria, urinary frequency, incontinence, nocturia  MSK: no joint or muscle pain, muscle weakness, joint swelling  Skin: no rashes, bruising, hair loss, color change  Neuro: no headache, dizziness, fainting, paresthesias, memory loss  Endo: no heat or cold intolerance, increased thirst, hot flashes  Psych: no changes in mood      MEDICATIONS:   MEDICATIONS  (STANDING):  benzonatate 100 milliGRAM(s) Oral three times a day  lactulose Syrup 10 Gram(s) Oral three times a day  lidocaine   Patch 1 Patch Transdermal every other day  melatonin 3 milliGRAM(s) Oral at bedtime  pantoprazole    Tablet 40 milliGRAM(s) Oral two times a day  propranolol 20 milliGRAM(s) Oral two times a day    MEDICATIONS  (PRN):  guaiFENesin    Syrup 100 milliGRAM(s) Oral every 6 hours PRN Cough    ALLERGIES:   No Known Allergies      OBJECTIVE:  Vital Signs Last 24 Hrs  T(C): 37.4 (07 Jun 2018 05:42), Max: 37.4 (07 Jun 2018 05:42)  T(F): 99.3 (07 Jun 2018 05:42), Max: 99.3 (07 Jun 2018 05:42)  HR: 60 (07 Jun 2018 05:42) (55 - 61)  BP: 110/50 (07 Jun 2018 05:42) (110/50 - 135/60)  BP(mean): --  RR: 18 (07 Jun 2018 05:42) (17 - 18)  SpO2: 99% (07 Jun 2018 05:42) (99% - 100%)    I&O's Summary    06 Jun 2018 07:01  -  07 Jun 2018 07:00  --------------------------------------------------------  IN: 400 mL / OUT: 400 mL / NET: 0 mL    PHYSICAL EXAM:  General: Well-appearing, NAD  HEENT:  EOMI, PERRL, conjunctiva and sclera clear, normal oropharynx  Neck:  Supple, no JVD  Chest/Lungs: CTA B/L, no rales, rhonchi, rub or wheezing  Heart: RRR, normal S1, S2, no murmurs or gallops  Abdomen: Soft, nondistended, NTTP, normoactive bowel sounds  Extremities: Peripheral pulses 2+ B/L, no edema, cyanosis or clubbing  Skin: Warm, well-perfused, no rashes or lesions  Neurological: A&Ox3, no focal deficits    LABS:  CBC Full  -  ( 07 Jun 2018 05:40 )  WBC Count : 7.39 K/uL  Hemoglobin : 8.3 g/dL  Hematocrit : 26.6 %  Platelet Count - Automated : 245 K/uL  Mean Cell Volume : 92.4 fL  Mean Cell Hemoglobin : 28.8 pg  Mean Cell Hemoglobin Concentration : 31.2 %  Auto Neutrophil # : x  Auto Lymphocyte # : x  Auto Monocyte # : x  Auto Eosinophil # : x  Auto Basophil # : x  Auto Neutrophil % : x  Auto Lymphocyte % : x  Auto Monocyte % : x  Auto Eosinophil % : x  Auto Basophil % : x    06-07    138  |  101  |  26<H>  ----------------------------<  105<H>  4.2   |  28  |  1.13    Ca    8.5      07 Jun 2018 05:40  Phos  3.1     06-07  Mg     1.9     06-07    TPro  6.3  /  Alb  2.8<L>  /  TBili  0.6  /  DBili  x   /  AST  33  /  ALT  21  /  AlkPhos  140<H>  06-07  TPro  6.2  /  Alb  2.6<L>  /  TBili  0.4  /  DBili  x   /  AST  36  /  ALT  21  /  AlkPhos  143<H>  06-06    LIVER FUNCTIONS - ( 07 Jun 2018 05:40 )  Alb: 2.8 g/dL / Pro: 6.3 g/dL / ALK PHOS: 140 u/L / ALT: 21 u/L / AST: 33 u/L / GGT: x             IMAGING:   MRI results pending    Labs and imaging personally reviewed and interpreted [ x ]  Consult notes reviewed [ x ]  Case discussed with consultants [ x ]

## 2018-06-07 NOTE — PROGRESS NOTE ADULT - PROBLEM SELECTOR PROBLEM 1
Upper GI hemorrhage

## 2018-06-07 NOTE — PROVIDER CONTACT NOTE (OTHER) - NAME OF MD/NP/PA/DO NOTIFIED:
/[id number]
/[id number]
Dr. HUMPHREY
Dr. HUMPHREY
Dr. NAGEL
Dr. SAENZ
MD Owens
MD sommre
Dr. NAGEL

## 2018-06-07 NOTE — PROGRESS NOTE ADULT - ATTENDING COMMENTS
Agree with above. Seen and examined with residents. Cirrhosis with variceal bleed. Improved H/H, on octreotide drip and PPI. Continue supportive care, needs PT.
Continue to trend hemoglobin/hematocrit.  Continue octreotide and ceftriaxone x 5 days total.  Will need repeat endoscopy in 3 weeks.  Will follow.
Et-OH cirrhosis complicated by UGIB variceal vs PUD.  Plan for EGD, with secured airways.  Continue octreotide and protonix gtt.  Continue to monitor cbcc and coags, resuscitate appropriately.
Patient was seen and examined with Gi fellow at rounds. Agree with above.
Patient was seen and examined with GI fellow at rounds. Agree with above.
Et-OH cirrhosis complicated by UGIB variceal s/p banding on octreotide and protonix gtt.  Continue to monitor cbcc and coags, resuscitate appropriately.  Clear liquids.  UTI: continue zosyn.  pressors for likely distributive shock related to UTI rather than hypovolemia secondary to GIB.
Patient was seen and examined with GI fellow at rounds. Agree with above.  A  79yo man with history of alcohol abuse, alcoholic cirrhosis, transferred from [facility] was seen for s/p GIB from EV bleeding and liver lesion. .   Patient is s/p EGD with EV band ligation, s/p a course of treatment for variceal bleeding, on NSBB. Patient has no further GI bleeding.   Will recommend  -monitor CBC  -check AFP and CA 19-9  -abdominal MRI with Eovist  -continue care per primary team
patients seen and examined by myself, case d/w resident agree with the above finding and plan  awaiting MRCP - follow up GI recs.  no further bleeding noted.    PT recommend rehab
MRI not consistent with HCC at this time - needs close outpt followup.  Pt agrees to follow with hepatologist.  discharge planning and coordination ~ 45mins.
awaiting MRCP - follow up GI recs.  no further bleeding noted.  follow up PT recs.
obtain MR with eovist to rule out HCC - can check afp and ca 19-9.  d/c planning to rehab.
pt does have RUQ pain with palpation - would pursue MRCP while in house.  pt interested in receiving etoh rehab programs information - SW aware.  PT eval.  discussed plan of care with pt and family at bedside.
follow up with MRI for liver lesion.  d/c planning to rehab with close hepatology follow up.
hospital course reviewed.  pt with acute blood loss anemia secondary to esophageal varices due to his etoh abuse.  appreciate gi recs - continue with cbc q8, propranolol and protonix.  continue with ctx for sbp ppx.
patients seen and examined by myself, case d/w resident agree with the above finding and plan   MRCP  noted - follow up GI recs.  no further bleeding noted.    2.5cm irregular hypodensity found on lower pole of R kidney on MRCP, ddx infection vs neoplasm.  - f/u renal ultrasound for further workup  PT recommend rehab
will obtain renal US for renal lesion seen on MR.  also will get liver protocol MR.  d/c planning to rehab.

## 2018-06-07 NOTE — PROGRESS NOTE ADULT - PROBLEM SELECTOR PLAN 5
DVT ppx: SCDs  Soft diet with ensure supplementation  Dispo: discharge to [facility] today following MRI

## 2018-06-07 NOTE — PROVIDER CONTACT NOTE (OTHER) - ASSESSMENT
patient heart rate was 59 no distress noted. patient sitting up in bed talking with family
/48. No signs of distress.
heart rate 52  no acute distress noted.
heart rate 54  no acute distress noted.
heart rate 56  no acute distress noted.
heart rate 58, bp 99/51
patient heart rate was 55 no distress was noted, patient has propranolol due at this time
patient heart rate was 57 no distress noted. patient has propranolol due at this time
patient heart rate was 58 no distress noted. patient sitting up in bed
patient heart rate was 59 no distress noted patient lying down comfortably
pt is alert and oriented. no distress noted.
pt is alert and oriented. no distress noted.

## 2018-06-07 NOTE — PROGRESS NOTE ADULT - PROVIDER SPECIALTY LIST ADULT
Gastroenterology
Internal Medicine
MICU
Vascular Surgery
Vascular Surgery
Gastroenterology
Internal Medicine

## 2018-06-07 NOTE — PROGRESS NOTE ADULT - ASSESSMENT
78M with alcoholic cirrhosis, HTN presenting from [facility] for management of acute GI bleed. Patient initially presented to [facility] with chest and abdominal pain, developed worsening CP and Afib with RVR and was started on full-dose AC for possible ACS but subsequently had coffee-ground emesis, BRBPR, and hypotension. Transferred to [facility] MICU for management of acute variceal bleed s/p multiple blood products and variceal banding. Found to have liver lesion concerning for HCC, pending hepatic MRI for further evaluation.

## 2018-06-07 NOTE — PROVIDER CONTACT NOTE (OTHER) - ACTION/TREATMENT ORDERED:
provider was notified.
MD notified, holding am propranolol no further interventions at this time, will continue to monitor.
MD notified, holding am propranolol no further interventions at this time, will continue to monitor.
MD notified, no further interventions at this time, will continue to monitor.
MD notified, no further interventions at this time, will continue to monitor.
MD notified. Will continue to monitor.
held am dose of propranolol . will continue monitoring.
no actions recommended at this time. will continue monitoring.
provider notified.
provider was notified
provider was notified.
provider was notified.

## 2018-06-07 NOTE — PROGRESS NOTE ADULT - PROBLEM SELECTOR PROBLEM 2
Liver cirrhosis

## 2018-06-07 NOTE — PROVIDER CONTACT NOTE (OTHER) - BACKGROUND
Patient admitted for alcoholic cirrhosis and UGIB.
patient 78 year old male, admitting diagnosis: GI hemorrhage
patient admitted with GI bleed
patient admitted with GI hemorrhage
pt is admtted with GI bleeding
pt is admtted with GI bleeding
patient admitted with GI hemorrhage

## 2018-06-07 NOTE — PROVIDER CONTACT NOTE (OTHER) - RECOMMENDATIONS
notify provider
Continue to monitor.
notified MD
notified MD
notify MD
notify MD as per parameters   no distress noted
notify MD, hold propranolol
notify MD, hold propranolol
notify provider
notify provider
notify provider hold proprandol as per protocol.
notify provider, hold propranolol as per protocol

## 2018-06-07 NOTE — PROVIDER CONTACT NOTE (OTHER) - DATE AND TIME:
01-Jun-2018 03:00
01-Jun-2018 06:51
03-Jun-2018 14:57
03-Jun-2018 18:14
03-Jun-2018 21:00
04-Jun-2018 05:30
04-Jun-2018 22:05
05-Jun-2018 05:23
05-Jun-2018 17:47
06-Jun-2018 20:52
31-May-2018 15:19
31-May-2018 11:01

## 2018-06-07 NOTE — PROVIDER CONTACT NOTE (OTHER) - REASON
HR less than 60
HR less than 60
Low Blood Pressure
heart rate 52
heart rate 54
heart rate 56
heart rate 58, bp 99/51
patient heart rate 55
patient heart rate 57
patient heart rate was 58
patient heart rate was 59
patient heart rate was 59

## 2018-06-07 NOTE — PROGRESS NOTE ADULT - SUBJECTIVE AND OBJECTIVE BOX
Patient is a 78y old  Male who presents with a chief complaint of GI bleed (03 Jun 2018 20:17)      SUBJECTIVE / OVERNIGHT EVENTS:  no events  MEDICATIONS  (STANDING):  benzonatate 100 milliGRAM(s) Oral three times a day  lactulose Syrup 10 Gram(s) Oral three times a day  lidocaine   Patch 1 Patch Transdermal every other day  melatonin 3 milliGRAM(s) Oral at bedtime  pantoprazole    Tablet 40 milliGRAM(s) Oral two times a day  propranolol 20 milliGRAM(s) Oral two times a day    MEDICATIONS  (PRN):  guaiFENesin    Syrup 100 milliGRAM(s) Oral every 6 hours PRN Cough              PHYSICAL EXAM:  GENERAL: NAD, well-developed  HEAD:  Atraumatic, Normocephalic  EYES: EOMI, PERRLA, conjunctiva and sclera anicteric  NECK: Supple, No JVD  CHEST/LUNG: Clear to auscultation bilaterally; No wheeze  HEART: Regular rate and rhythm; No murmurs, rubs, or gallops  ABDOMEN: Soft, Nontender, Nondistended; Bowel sounds present, no hepatosplenomegaly, no rebound or guarding  EXTREMITIES:  2+ Peripheral Pulses, No clubbing, cyanosis, or edema  PSYCH: AAOx3  NEUROLOGY: non-focal, no asterixis  SKIN: No rashes or lesion    LABS:                        8.3    7.39  )-----------( 245      ( 07 Jun 2018 05:40 )             26.6     06-07    138  |  101  |  26<H>  ----------------------------<  105<H>  4.2   |  28  |  1.13    Ca    8.5      07 Jun 2018 05:40  Phos  3.1     06-07  Mg     1.9     06-07    TPro  6.3  /  Alb  2.8<L>  /  TBili  0.6  /  DBili  x   /  AST  33  /  ALT  21  /  AlkPhos  140<H>  06-07    LIVER FUNCTIONS - ( 07 Jun 2018 05:40 )  Alb: 2.8 g/dL / Pro: 6.3 g/dL / ALK PHOS: 140 u/L / ALT: 21 u/L / AST: 33 u/L / GGT: x           PT/INR - ( 06 Jun 2018 05:20 )   PT: 16.9 SEC;   INR: 1.46                    RADIOLOGY & ADDITIONAL TESTS:

## 2018-08-27 ENCOUNTER — APPOINTMENT (OUTPATIENT)
Dept: HEPATOLOGY | Facility: CLINIC | Age: 78
End: 2018-08-27
Payer: MEDICARE

## 2018-08-27 VITALS
RESPIRATION RATE: 14 BRPM | BODY MASS INDEX: 28.58 KG/M2 | DIASTOLIC BLOOD PRESSURE: 77 MMHG | HEIGHT: 69 IN | HEART RATE: 67 BPM | SYSTOLIC BLOOD PRESSURE: 128 MMHG | TEMPERATURE: 97.6 F | WEIGHT: 193 LBS

## 2018-08-27 DIAGNOSIS — Z72.89 OTHER PROBLEMS RELATED TO LIFESTYLE: ICD-10-CM

## 2018-08-27 DIAGNOSIS — Z78.9 OTHER SPECIFIED HEALTH STATUS: ICD-10-CM

## 2018-08-27 DIAGNOSIS — R41.3 OTHER AMNESIA: ICD-10-CM

## 2018-08-27 DIAGNOSIS — M19.90 UNSPECIFIED OSTEOARTHRITIS, UNSPECIFIED SITE: ICD-10-CM

## 2018-08-27 LAB
BASOPHILS # BLD AUTO: 0.08 K/UL
BASOPHILS NFR BLD AUTO: 1.2 %
EOSINOPHIL # BLD AUTO: 0.52 K/UL
EOSINOPHIL NFR BLD AUTO: 8.1 %
HBV CORE IGG+IGM SER QL: NONREACTIVE
HBV SURFACE AB SER QL: REACTIVE
HBV SURFACE AG SER QL: NONREACTIVE
HCT VFR BLD CALC: 38 %
HCV AB SER QL: NONREACTIVE
HCV S/CO RATIO: 0.08 S/CO
HGB BLD-MCNC: 11.8 G/DL
IMM GRANULOCYTES NFR BLD AUTO: 0.5 %
INR PPP: 1.28 RATIO
LYMPHOCYTES # BLD AUTO: 1.88 K/UL
LYMPHOCYTES NFR BLD AUTO: 29.3 %
MAN DIFF?: NORMAL
MCHC RBC-ENTMCNC: 24.4 PG
MCHC RBC-ENTMCNC: 31.1 GM/DL
MCV RBC AUTO: 78.7 FL
MONOCYTES # BLD AUTO: 0.66 K/UL
MONOCYTES NFR BLD AUTO: 10.3 %
NEUTROPHILS # BLD AUTO: 3.25 K/UL
NEUTROPHILS NFR BLD AUTO: 50.6 %
PLATELET # BLD AUTO: 215 K/UL
PT BLD: 14.5 SEC
RBC # BLD: 4.83 M/UL
RBC # FLD: 18.2 %
WBC # FLD AUTO: 6.42 K/UL

## 2018-08-27 PROCEDURE — 99204 OFFICE O/P NEW MOD 45 MIN: CPT

## 2018-08-27 PROCEDURE — 99215 OFFICE O/P EST HI 40 MIN: CPT

## 2018-08-27 RX ORDER — PNV NO.95/FERROUS FUM/FOLIC AC 28MG-0.8MG
500-200 TABLET ORAL
Refills: 0 | Status: ACTIVE | COMMUNITY

## 2018-08-28 ENCOUNTER — CLINICAL ADVICE (OUTPATIENT)
Age: 78
End: 2018-08-28

## 2018-08-28 LAB
ALBUMIN SERPL ELPH-MCNC: 3.4 G/DL
ALP BLD-CCNC: 110 U/L
ALT SERPL-CCNC: 15 U/L
ANION GAP SERPL CALC-SCNC: 12 MMOL/L
AST SERPL-CCNC: 44 U/L
BILIRUB SERPL-MCNC: 0.4 MG/DL
BUN SERPL-MCNC: 33 MG/DL
CALCIUM SERPL-MCNC: 9.7 MG/DL
CHLORIDE SERPL-SCNC: 104 MMOL/L
CO2 SERPL-SCNC: 21 MMOL/L
CREAT SERPL-MCNC: 1.83 MG/DL
DEPRECATED KAPPA LC FREE/LAMBDA SER: 1.86 RATIO
FERRITIN SERPL-MCNC: 73 NG/ML
GGT SERPL-CCNC: 75 U/L
GLUCOSE SERPL-MCNC: 95 MG/DL
IGA SER QL IEP: 748 MG/DL
IGG SER QL IEP: 1562 MG/DL
IGM SER QL IEP: 24 MG/DL
IRON SATN MFR SERPL: 12 %
IRON SERPL-MCNC: 52 UG/DL
KAPPA LC CSF-MCNC: 6.41 MG/DL
KAPPA LC SERPL-MCNC: 11.94 MG/DL
POTASSIUM SERPL-SCNC: 6.5 MMOL/L
PROT SERPL-MCNC: 7.9 G/DL
SODIUM SERPL-SCNC: 137 MMOL/L
TIBC SERPL-MCNC: 429 UG/DL
UIBC SERPL-MCNC: 377 UG/DL

## 2018-08-29 LAB — ANA SER IF-ACNC: NEGATIVE

## 2018-08-31 LAB
A1AT PHENOTYP SERPL-IMP: NORMAL BANDS
A1AT SERPL-MCNC: 176 MG/DL
ALBUMIN SERPL ELPH-MCNC: 3.3 G/DL
ALP BLD-CCNC: 85 U/L
ALT SERPL-CCNC: 10 U/L
ANION GAP SERPL CALC-SCNC: 12 MMOL/L
AST SERPL-CCNC: 25 U/L
BILIRUB SERPL-MCNC: 0.4 MG/DL
BUN SERPL-MCNC: 28 MG/DL
CALCIUM SERPL-MCNC: 9.2 MG/DL
CHLORIDE SERPL-SCNC: 104 MMOL/L
CO2 SERPL-SCNC: 24 MMOL/L
CREAT SERPL-MCNC: 1.62 MG/DL
GLUCOSE SERPL-MCNC: 130 MG/DL
MITOCHONDRIA AB SER IF-ACNC: NORMAL
POTASSIUM SERPL-SCNC: 4.4 MMOL/L
PROT SERPL-MCNC: 7.1 G/DL
SMOOTH MUSCLE AB SER QL IF: ABNORMAL
SODIUM SERPL-SCNC: 140 MMOL/L

## 2018-09-12 ENCOUNTER — FORM ENCOUNTER (OUTPATIENT)
Age: 78
End: 2018-09-12

## 2018-09-12 ENCOUNTER — OUTPATIENT (OUTPATIENT)
Dept: OUTPATIENT SERVICES | Facility: HOSPITAL | Age: 78
LOS: 1 days | Discharge: HOME | End: 2018-09-12
Payer: MEDICARE

## 2018-09-12 ENCOUNTER — APPOINTMENT (OUTPATIENT)
Dept: HEPATOLOGY | Facility: HOSPITAL | Age: 78
End: 2018-09-12

## 2018-09-12 ENCOUNTER — RESULT REVIEW (OUTPATIENT)
Age: 78
End: 2018-09-12

## 2018-09-12 DIAGNOSIS — Z98.890 OTHER SPECIFIED POSTPROCEDURAL STATES: Chronic | ICD-10-CM

## 2018-09-12 DIAGNOSIS — K70.30 ALCOHOLIC CIRRHOSIS OF LIVER WITHOUT ASCITES: ICD-10-CM

## 2018-09-12 PROCEDURE — 43239 EGD BIOPSY SINGLE/MULTIPLE: CPT | Mod: GC

## 2018-09-12 PROCEDURE — 88305 TISSUE EXAM BY PATHOLOGIST: CPT | Mod: 26

## 2018-09-12 PROCEDURE — 88312 SPECIAL STAINS GROUP 1: CPT | Mod: 26

## 2018-09-13 ENCOUNTER — OUTPATIENT (OUTPATIENT)
Dept: OUTPATIENT SERVICES | Facility: HOSPITAL | Age: 78
LOS: 1 days | End: 2018-09-13

## 2018-09-13 ENCOUNTER — APPOINTMENT (OUTPATIENT)
Dept: MRI IMAGING | Facility: CLINIC | Age: 78
End: 2018-09-13
Payer: MEDICARE

## 2018-09-13 DIAGNOSIS — Z00.8 ENCOUNTER FOR OTHER GENERAL EXAMINATION: ICD-10-CM

## 2018-09-13 DIAGNOSIS — Z98.890 OTHER SPECIFIED POSTPROCEDURAL STATES: Chronic | ICD-10-CM

## 2018-09-13 PROCEDURE — 74183 MRI ABD W/O CNTR FLWD CNTR: CPT | Mod: 26

## 2018-10-29 ENCOUNTER — LABORATORY RESULT (OUTPATIENT)
Age: 78
End: 2018-10-29

## 2018-10-30 LAB
BASOPHILS # BLD AUTO: 0.03 K/UL
BASOPHILS NFR BLD AUTO: 0.3 %
EOSINOPHIL # BLD AUTO: 0.35 K/UL
EOSINOPHIL NFR BLD AUTO: 3.6 %
HCT VFR BLD CALC: 33 %
HGB BLD-MCNC: 10.9 G/DL
IMM GRANULOCYTES NFR BLD AUTO: 0.7 %
LYMPHOCYTES # BLD AUTO: 1.15 K/UL
LYMPHOCYTES NFR BLD AUTO: 11.9 %
MAN DIFF?: NORMAL
MCHC RBC-ENTMCNC: 25.2 PG
MCHC RBC-ENTMCNC: 33 GM/DL
MCV RBC AUTO: 76.4 FL
MONOCYTES # BLD AUTO: 0.76 K/UL
MONOCYTES NFR BLD AUTO: 7.9 %
NEUTROPHILS # BLD AUTO: 7.29 K/UL
NEUTROPHILS NFR BLD AUTO: 75.6 %
PLATELET # BLD AUTO: 148 K/UL
RBC # BLD: 4.32 M/UL
RBC # FLD: 19.6 %
WBC # FLD AUTO: 9.65 K/UL

## 2018-10-31 LAB
ALBUMIN SERPL ELPH-MCNC: 2.9 G/DL
ALP BLD-CCNC: 160 U/L
ALT SERPL-CCNC: 13 U/L
ANION GAP SERPL CALC-SCNC: 12 MMOL/L
AST SERPL-CCNC: 21 U/L
BILIRUB SERPL-MCNC: 0.5 MG/DL
BUN SERPL-MCNC: 30 MG/DL
CALCIUM SERPL-MCNC: 9 MG/DL
CHLORIDE SERPL-SCNC: 108 MMOL/L
CO2 SERPL-SCNC: 18 MMOL/L
CREAT SERPL-MCNC: 2.09 MG/DL
GLUCOSE SERPL-MCNC: 130 MG/DL
INR PPP: 1.78 RATIO
POTASSIUM SERPL-SCNC: 5.1 MMOL/L
PROT SERPL-MCNC: 6.5 G/DL
PT BLD: 20.3 SEC
SODIUM SERPL-SCNC: 138 MMOL/L

## 2018-11-05 ENCOUNTER — APPOINTMENT (OUTPATIENT)
Dept: HEPATOLOGY | Facility: CLINIC | Age: 78
End: 2018-11-05
Payer: MEDICARE

## 2018-11-05 VITALS
WEIGHT: 196 LBS | BODY MASS INDEX: 32.65 KG/M2 | SYSTOLIC BLOOD PRESSURE: 120 MMHG | HEART RATE: 80 BPM | DIASTOLIC BLOOD PRESSURE: 70 MMHG | RESPIRATION RATE: 16 BRPM | HEIGHT: 65 IN | TEMPERATURE: 98.3 F

## 2018-11-05 DIAGNOSIS — Z87.19 PERSONAL HISTORY OF OTHER DISEASES OF THE DIGESTIVE SYSTEM: ICD-10-CM

## 2018-11-05 PROCEDURE — 99214 OFFICE O/P EST MOD 30 MIN: CPT

## 2018-11-05 RX ORDER — PANTOPRAZOLE 40 MG/1
40 TABLET, DELAYED RELEASE ORAL
Qty: 30 | Refills: 5 | Status: DISCONTINUED | COMMUNITY
Start: 1900-01-01 | End: 2018-11-05

## 2019-01-07 ENCOUNTER — APPOINTMENT (OUTPATIENT)
Dept: HEPATOLOGY | Facility: CLINIC | Age: 79
End: 2019-01-07
Payer: MEDICARE

## 2019-01-07 VITALS
SYSTOLIC BLOOD PRESSURE: 154 MMHG | WEIGHT: 194 LBS | RESPIRATION RATE: 16 BRPM | HEART RATE: 80 BPM | DIASTOLIC BLOOD PRESSURE: 82 MMHG | HEIGHT: 65 IN | BODY MASS INDEX: 32.32 KG/M2 | TEMPERATURE: 98 F

## 2019-01-07 PROCEDURE — 99214 OFFICE O/P EST MOD 30 MIN: CPT

## 2019-01-07 RX ORDER — ACETAMINOPHEN 500 MG/1
500 TABLET, COATED ORAL
Refills: 0 | Status: ACTIVE | COMMUNITY

## 2019-01-07 NOTE — CONSULT LETTER
[Dear  ___] : Dear  [unfilled], [Courtesy Letter:] : I had the pleasure of seeing your patient, [unfilled], in my office today. [Please see my note below.] : Please see my note below. [Referral Closing:] : Thank you very much for seeing this patient.  If you have any questions, please do not hesitate to contact me. [Sincerely,] : Sincerely, [FreeTextEntry2] : Brian Pizarro (PCP) [FreeTextEntry3] : Richie Mcgraw MD\par , [facility]\par General Hepatology and Gastroenterology\par [facility]\par [facility]\par [address] [place] [address]\[place] [address]\par [phone]\par

## 2019-01-07 NOTE — HISTORY OF PRESENT ILLNESS
[Alcohol Abuse] : alcohol abuse [Malaise] : denies malaise [Fever] : denies fever [Diffuse Joint Pain (Arthralgias)] : arthralgias stable [Muscle Aches, Generalized (Myalgias)] : denies myalgias [Yellow Skin Or Eyes (Jaundice)] : denies jaundice [Abdominal Pain] : denies abdominal pain [Urine Tests Nonspecific Abnormal Findings Biliuria] : denies dark urine [Light Colored Bowel Movement (Acholic Stools)] : denies pale stools [Insomnia] : insomnia stable [Skin: Rash] : denies rash [Itching (Pruritus)] : denies pruritus [Shortness Of Breath] : denies shortness of breath [Needlestick Exposure] : no needlestick exposure [Infected Sexual Partner] : no infected sexual partner [IV Drug Use] : no IV drug use [Tattoo] : no tattoos [Body Piercing] : no body piercing [Hemodialysis] : no hemodialysis [Transfusion before 1992] : no transfusion before 1992 [Transplant before 1992] : no transplant before 1992 [Incarceration] : no incarceration [Autoimmune Disorder] : no autoimmune disorder [Household Contact to HBV] : no household contact to HBV [Travel to Endemic Area] : no travel to an endemic area [Occupational Exposure] : no occupational exposure [Cocaine Use] : no cocaine use [Wt Gain ___ Lbs] : no recent weight gain [Wt Loss ___ Lbs] : no recent weight loss [de-identified] : Mr. TIPTON is a 78 year old man with HTN, distant Guillain Hawley syndrome, alcoholic cirrhosis (off transplant list at [facility] as improved), who relapsed and was hospitalized in 5/2018 with esophageal variceal bleed and a MELD-Na of 15, banded. 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 He was discharged with Propranolol 20 mg bid, lactulose 10 mg/d for constipation - confused years ago.\par \par - 1/7/18: here one month early for left-sided abdominal pain that started 1 week ago. He as apparently visibly bloated with abdominal distension yesterday - him and wife report that is is not there anymore today. He stopped PPI last visit. Pain occurs on empty stomach. He also feels gas, improved when he did take some pantoprazole that he still had, after 30 minutes or after drinking milk. Takes lactulose BID instead of TID, is unsure of BM - had some small BM 1 or 2 days ago. \par \par - 11/5/18: doing well, last alcohol 8/2018. Takes Advil for hip pain.\par \par - MRI 9/13/18 ([facility]): liver lesions not identified. Cholelithiasis, renal mass resolved - was possibly inflammatory.  3.9 cm abdominal wall lesion (right anteriorly), no signif change from 5/2018. May be hematoma.\par - EGD 9/12/18: esophagus: no varices. Scars from prior banding. stomach: portal HTN gastropathy, erythema. Biopsied. Duodenum: normal. \par - CT 5/25/18: cholelithiasis and porcellain GB, renal lesion right lower pole\par - MRI abdomen 6/7/18: LI-RADS 3 lesions right hepatic dome and lat. segment L lobe. Rec 4 mo f/u. Gallbladder: gallstones. 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 Renal abnormality resolved - may have been inflammation or infection.\par \par - EGD 5/27/218: large varices, completely eradicated, banded. clotted blood in stomach.\par Last colonoscopy: ~2014, result "ok"\par \par Last alcohol: 5/27/18 when hospitalized.

## 2019-01-07 NOTE — ASSESSMENT
[FreeTextEntry1] : Mr. TIPTON is a 78 year old man\par \par - today here with left-sided abdom. pain on empty stomach. Started 1 week ago. we stopped PPI last time. Improves with milk intake, and PPI. Likely dyspepsia, maybe also constipation. Does take lactulose bid, but infrequent bowel movements. No weight loss. Chest pain is musculoskelettal - ribs tender under left axilla.\par \par - Alcoholic cirrhosis, possibly also due to WALL given obesity with BMI 32;  MELD 20 on 10/29/19\par - variceal bleed 8/2018, banded. Follow-up EGD on 9/12/18: no varices. 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 He will call in 2 days to report again about his symptoms.\par - constipation: continue lactulose, take TID. 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

## 2019-02-05 LAB
INR PPP: 1.36 RATIO
PT BLD: 15.6 SEC

## 2019-02-06 LAB
ALBUMIN SERPL ELPH-MCNC: 3.4 G/DL
ALP BLD-CCNC: 112 U/L
ALT SERPL-CCNC: 11 U/L
ANION GAP SERPL CALC-SCNC: 16 MMOL/L
AST SERPL-CCNC: 19 U/L
BASOPHILS # BLD AUTO: 0.07 K/UL
BASOPHILS NFR BLD AUTO: 1.1 %
BILIRUB SERPL-MCNC: 0.4 MG/DL
BUN SERPL-MCNC: 16 MG/DL
CALCIUM SERPL-MCNC: 9 MG/DL
CHLORIDE SERPL-SCNC: 105 MMOL/L
CO2 SERPL-SCNC: 19 MMOL/L
CREAT SERPL-MCNC: 1.58 MG/DL
EOSINOPHIL # BLD AUTO: 0.56 K/UL
EOSINOPHIL NFR BLD AUTO: 9.1 %
GLUCOSE SERPL-MCNC: 102 MG/DL
HCT VFR BLD CALC: 39.9 %
HGB BLD-MCNC: 12.2 G/DL
IMM GRANULOCYTES NFR BLD AUTO: 0.3 %
LYMPHOCYTES # BLD AUTO: 1.56 K/UL
LYMPHOCYTES NFR BLD AUTO: 25.3 %
MAN DIFF?: NORMAL
MCHC RBC-ENTMCNC: 24.8 PG
MCHC RBC-ENTMCNC: 30.6 GM/DL
MCV RBC AUTO: 81.1 FL
MONOCYTES # BLD AUTO: 0.57 K/UL
MONOCYTES NFR BLD AUTO: 9.2 %
NEUTROPHILS # BLD AUTO: 3.39 K/UL
NEUTROPHILS NFR BLD AUTO: 55 %
PLATELET # BLD AUTO: 218 K/UL
POTASSIUM SERPL-SCNC: 4.7 MMOL/L
PROT SERPL-MCNC: 6.9 G/DL
RBC # BLD: 4.92 M/UL
RBC # FLD: 16.2 %
SODIUM SERPL-SCNC: 140 MMOL/L
WBC # FLD AUTO: 6.17 K/UL

## 2019-02-12 ENCOUNTER — APPOINTMENT (OUTPATIENT)
Dept: HEPATOLOGY | Facility: CLINIC | Age: 79
End: 2019-02-12
Payer: MEDICARE

## 2019-02-12 VITALS
HEIGHT: 65 IN | HEART RATE: 52 BPM | WEIGHT: 196 LBS | RESPIRATION RATE: 16 BRPM | DIASTOLIC BLOOD PRESSURE: 52 MMHG | SYSTOLIC BLOOD PRESSURE: 125 MMHG | BODY MASS INDEX: 32.65 KG/M2 | TEMPERATURE: 97.9 F

## 2019-02-12 DIAGNOSIS — K76.9 LIVER DISEASE, UNSPECIFIED: ICD-10-CM

## 2019-02-12 DIAGNOSIS — R10.13 EPIGASTRIC PAIN: ICD-10-CM

## 2019-02-12 PROCEDURE — 99214 OFFICE O/P EST MOD 30 MIN: CPT

## 2019-02-12 NOTE — HISTORY OF PRESENT ILLNESS
[Alcohol Abuse] : alcohol abuse [Malaise] : denies malaise [Fever] : denies fever [Diffuse Joint Pain (Arthralgias)] : arthralgias stable [Muscle Aches, Generalized (Myalgias)] : denies myalgias [Yellow Skin Or Eyes (Jaundice)] : denies jaundice [Abdominal Pain] : denies abdominal pain [Urine Tests Nonspecific Abnormal Findings Biliuria] : denies dark urine [Light Colored Bowel Movement (Acholic Stools)] : denies pale stools [Insomnia] : insomnia stable [Skin: Rash] : denies rash [Itching (Pruritus)] : denies pruritus [Shortness Of Breath] : denies shortness of breath [Needlestick Exposure] : no needlestick exposure [Infected Sexual Partner] : no infected sexual partner [IV Drug Use] : no IV drug use [Tattoo] : no tattoos [Body Piercing] : no body piercing [Hemodialysis] : no hemodialysis [Transfusion before 1992] : no transfusion before 1992 [Transplant before 1992] : no transplant before 1992 [Incarceration] : no incarceration [Autoimmune Disorder] : no autoimmune disorder [Household Contact to HBV] : no household contact to HBV [Travel to Endemic Area] : no travel to an endemic area [Occupational Exposure] : no occupational exposure [Cocaine Use] : no cocaine use [Wt Gain ___ Lbs] : no recent weight gain [Wt Loss ___ Lbs] : no recent weight loss [de-identified] : Mr. TIPTON is a 78 year old man with HTN,  GERD with dyspepsia 1/2019, distant Guillain [place] syndrome, alcoholic cirrhosis (off transplant list at [facility] as improved), who relapsed and was hospitalized in 5/2018 with esophageal variceal bleed and a MELD-Na of 15, banded. 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 He was discharged with Propranolol 20 mg bid, lactulose 10 mg/d for constipation - confused years ago.\par \par - 2/12/19: reports resolved dyspepsia (pain on empty stomach), takes lactulose three times per day for constipation, has 1 BM/d. No edema, not on diuretics.\par - 1/7/18: here one month early for left-sided abdominal pain that started 1 week ago. He as apparently visibly bloated with abdominal distension yesterday - him and wife report that is is not there anymore today. He stopped PPI last visit. Pain occurs on empty stomach. He also feels gas, improved when he did take some pantoprazole that he still had, after 30 minutes or after drinking milk. Takes lactulose BID instead of TID, is unsure of BM - had some small BM 1 or 2 days ago. \par \par - 11/5/18: doing well, last alcohol 8/2018. Takes Advil for hip pain.\par \par - MRI 9/13/18 ([facility]): liver lesions not identified. Cholelithiasis, renal mass resolved - was possibly inflammatory.  3.9 cm abdominal wall lesion (right anteriorly), no signif change from 5/2018. May be hematoma.\par - EGD 9/12/18: esophagus: no varices. Scars from prior banding. stomach: portal HTN gastropathy, erythema. Biopsied. Duodenum: normal. \par - CT 5/25/18: cholelithiasis and porcellain GB, renal lesion right lower pole\par - MRI abdomen 6/7/18: LI-RADS 3 lesions right hepatic dome and lat. segment L lobe. Rec 4 mo f/u. Gallbladder: gallstones. 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 Renal abnormality resolved - may have been inflammation or infection.\par \par - EGD 5/27/218: large varices, completely eradicated, banded. clotted blood in stomach.\par Last colonoscopy: ~2014, result "ok"\par \par Last alcohol: 5/27/18 when hospitalized.

## 2019-02-12 NOTE — ASSESSMENT
[FreeTextEntry1] : Mr. TIPTON is a 78 year old man\par \par - today here with left-sided abdom. pain on empty stomach. Started 1 week ago. we stopped PPI last time. Improves with milk intake, and PPI. Likely dyspepsia, maybe also constipation. Does take lactulose bid, but infrequent bowel movements. No weight loss. Chest pain is musculoskelettal - ribs tender under left axilla.\par \par - Alcoholic cirrhosis, possibly also due to WALL given obesity with BMI 32;  MELD 20 on 10/29/19\par - variceal bleed 8/2018, banded. Follow-up EGD on 9/12/18: no varices. 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 If positive, will do colonoscopy.\par - HCC screening: US in March\par - dyspepsia: continue PPI\par - constipation: continue lactulose, take TID. 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

## 2019-02-12 NOTE — CONSULT LETTER
[Dear  ___] : Dear  [unfilled], [Courtesy Letter:] : I had the pleasure of seeing your patient, [unfilled], in my office today. [Please see my note below.] : Please see my note below. [Referral Closing:] : Thank you very much for seeing this patient.  If you have any questions, please do not hesitate to contact me. [Sincerely,] : Sincerely, [FreeTextEntry2] : Brian Pizarro (PCP) [FreeTextEntry3] : Richie Mcgraw MD\par , [facility]\par General Hepatology and Gastroenterology\par [facility]\par [facility]\par [address] [place] [address]\[place] [address]\par [phone]\par

## 2019-02-27 ENCOUNTER — APPOINTMENT (OUTPATIENT)
Dept: HEPATOLOGY | Facility: HOSPITAL | Age: 79
End: 2019-02-27

## 2019-02-27 ENCOUNTER — OUTPATIENT (OUTPATIENT)
Dept: OUTPATIENT SERVICES | Facility: HOSPITAL | Age: 79
LOS: 1 days | Discharge: HOME | End: 2019-02-27
Payer: MEDICARE

## 2019-02-27 DIAGNOSIS — Z98.890 OTHER SPECIFIED POSTPROCEDURAL STATES: Chronic | ICD-10-CM

## 2019-02-27 DIAGNOSIS — K70.30 ALCOHOLIC CIRRHOSIS OF LIVER WITHOUT ASCITES: ICD-10-CM

## 2019-02-27 PROCEDURE — 43235 EGD DIAGNOSTIC BRUSH WASH: CPT

## 2019-02-28 ENCOUNTER — LABORATORY RESULT (OUTPATIENT)
Age: 79
End: 2019-02-28

## 2019-03-03 ENCOUNTER — FORM ENCOUNTER (OUTPATIENT)
Age: 79
End: 2019-03-03

## 2019-03-04 ENCOUNTER — OUTPATIENT (OUTPATIENT)
Dept: OUTPATIENT SERVICES | Facility: HOSPITAL | Age: 79
LOS: 1 days | End: 2019-03-04
Payer: MEDICARE

## 2019-03-04 ENCOUNTER — APPOINTMENT (OUTPATIENT)
Dept: ULTRASOUND IMAGING | Facility: CLINIC | Age: 79
End: 2019-03-04
Payer: MEDICARE

## 2019-03-04 DIAGNOSIS — Z98.890 OTHER SPECIFIED POSTPROCEDURAL STATES: Chronic | ICD-10-CM

## 2019-03-04 DIAGNOSIS — K70.30 ALCOHOLIC CIRRHOSIS OF LIVER WITHOUT ASCITES: ICD-10-CM

## 2019-03-04 PROCEDURE — 76700 US EXAM ABDOM COMPLETE: CPT

## 2019-03-04 PROCEDURE — 76700 US EXAM ABDOM COMPLETE: CPT | Mod: 26

## 2019-03-18 NOTE — ASSESSMENT
[FreeTextEntry1] : EGD 2/27/19: no varices. Scar in lower esophagus from prior banding. Mild GAVE. Arytenoid edema. Repeat in 1 year.

## 2019-03-21 LAB
ALBUMIN SERPL ELPH-MCNC: 3.9 G/DL
ALP BLD-CCNC: 104 U/L
ALT SERPL-CCNC: 13 U/L
ANION GAP SERPL CALC-SCNC: 13 MMOL/L
AST SERPL-CCNC: 24 U/L
BASOPHILS # BLD AUTO: 0.09 K/UL
BASOPHILS NFR BLD AUTO: 1.2 %
BILIRUB SERPL-MCNC: 0.5 MG/DL
BUN SERPL-MCNC: 18 MG/DL
CALCIUM SERPL-MCNC: 9.7 MG/DL
CHLORIDE SERPL-SCNC: 106 MMOL/L
CO2 SERPL-SCNC: 22 MMOL/L
CREAT SERPL-MCNC: 1.46 MG/DL
EOSINOPHIL # BLD AUTO: 0.39 K/UL
EOSINOPHIL NFR BLD AUTO: 5.4 %
FERRITIN SERPL-MCNC: 23 NG/ML
GLUCOSE SERPL-MCNC: 93 MG/DL
HCT VFR BLD CALC: 40.7 %
HGB BLD-MCNC: 12.4 G/DL
IMM GRANULOCYTES NFR BLD AUTO: 0.3 %
INR PPP: 1.35 RATIO
IRON SATN MFR SERPL: 21 %
IRON SERPL-MCNC: 78 UG/DL
LYMPHOCYTES # BLD AUTO: 1.9 K/UL
LYMPHOCYTES NFR BLD AUTO: 26.3 %
MAN DIFF?: NORMAL
MCHC RBC-ENTMCNC: 24.9 PG
MCHC RBC-ENTMCNC: 30.5 GM/DL
MCV RBC AUTO: 81.9 FL
MONOCYTES # BLD AUTO: 0.56 K/UL
MONOCYTES NFR BLD AUTO: 7.7 %
NEUTROPHILS # BLD AUTO: 4.27 K/UL
NEUTROPHILS NFR BLD AUTO: 59.1 %
PLATELET # BLD AUTO: 204 K/UL
POTASSIUM SERPL-SCNC: 4.7 MMOL/L
PROT SERPL-MCNC: 7.3 G/DL
PT BLD: 15.4 SEC
RBC # BLD: 4.97 M/UL
RBC # FLD: 16.8 %
SODIUM SERPL-SCNC: 141 MMOL/L
TIBC SERPL-MCNC: 377 UG/DL
UIBC SERPL-MCNC: 299 UG/DL
WBC # FLD AUTO: 7.23 K/UL

## 2019-03-26 ENCOUNTER — APPOINTMENT (OUTPATIENT)
Dept: HEPATOLOGY | Facility: CLINIC | Age: 79
End: 2019-03-26
Payer: MEDICARE

## 2019-03-26 VITALS
WEIGHT: 192 LBS | TEMPERATURE: 97.3 F | BODY MASS INDEX: 31.99 KG/M2 | RESPIRATION RATE: 16 BRPM | SYSTOLIC BLOOD PRESSURE: 105 MMHG | DIASTOLIC BLOOD PRESSURE: 55 MMHG | HEIGHT: 65 IN | HEART RATE: 74 BPM

## 2019-03-26 PROCEDURE — 99214 OFFICE O/P EST MOD 30 MIN: CPT

## 2019-03-26 NOTE — HISTORY OF PRESENT ILLNESS
[Alcohol Abuse] : alcohol abuse [Malaise] : denies malaise [Fever] : denies fever [Diffuse Joint Pain (Arthralgias)] : arthralgias stable [Muscle Aches, Generalized (Myalgias)] : denies myalgias [Yellow Skin Or Eyes (Jaundice)] : denies jaundice [Abdominal Pain] : denies abdominal pain [Urine Tests Nonspecific Abnormal Findings Biliuria] : denies dark urine [Light Colored Bowel Movement (Acholic Stools)] : denies pale stools [Insomnia] : insomnia stable [Skin: Rash] : denies rash [Itching (Pruritus)] : denies pruritus [Shortness Of Breath] : denies shortness of breath [Needlestick Exposure] : no needlestick exposure [Infected Sexual Partner] : no infected sexual partner [IV Drug Use] : no IV drug use [Tattoo] : no tattoos [Body Piercing] : no body piercing [Hemodialysis] : no hemodialysis [Transfusion before 1992] : no transfusion before 1992 [Transplant before 1992] : no transplant before 1992 [Incarceration] : no incarceration [Autoimmune Disorder] : no autoimmune disorder [Household Contact to HBV] : no household contact to HBV [Travel to Endemic Area] : no travel to an endemic area [Occupational Exposure] : no occupational exposure [Cocaine Use] : no cocaine use [Wt Gain ___ Lbs] : no recent weight gain [Wt Loss ___ Lbs] : no recent weight loss [de-identified] : Mr. TIPTON is a 79 year old man with HTN,  GERD with dyspepsia 1/2019, distant Guillain [place] syndrome, alcoholic cirrhosis (off transplant list at [facility] as improved), who relapsed and was hospitalized in 5/2018 with esophageal variceal bleed and a MELD-Na of 15, banded. 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 He was discharged with Propranolol 20 mg bid, lactulose 10 mg/d for constipation - confused years ago.\par \par - 3/26/19: doing well, rib pain has resolved. Is taking laculose 10 g 1 to 3 times daily for constipation, also propranolol 10 bid, pantoprazole 40 for GERD. Lost 4 lbs, BMI still 31.95. US was non-diagnostic. EGD showed no varices.\par - 2/12/19: reports resolved dyspepsia (pain on empty stomach), takes lactulose three times per day for constipation, has 1 BM/d. No edema, not on diuretics.\par - 1/7/18: here one month early for left-sided abdominal pain that started 1 week ago. He as apparently visibly bloated with abdominal distension yesterday - him and wife report that is is not there anymore today. He stopped PPI last visit. Pain occurs on empty stomach. He also feels gas, improved when he did take some pantoprazole that he still had, after 30 minutes or after drinking milk. Takes lactulose BID instead of TID, is unsure of BM - had some small BM 1 or 2 days ago. \par \par - 11/5/18: doing well, last alcohol 8/2018. Takes Advil for hip pain.\par \par Workup:\par - 3/5/19 US abdomen: poor visualization. Cirrhosis. Grossly, no obvious liver lesion. No ascites. Normal spleen.\par - 2/27/19 EGD: no varices. Scar in lower esophagus from prior banding. Mild GAVE. Arytenoid edema. Repeat in 1 year. \par - MRI 9/13/18 ([facility]): liver lesions not identified. Cholelithiasis, renal mass resolved - was possibly inflammatory.  3.9 cm abdominal wall lesion (right anteriorly), no signif change from 5/2018. May be hematoma.\par - EGD 9/12/18: esophagus: no varices. Scars from prior banding. stomach: portal HTN gastropathy, erythema. Biopsied. Duodenum: normal. \par - CT 5/25/18: cholelithiasis and porcellain GB, renal lesion right lower pole\par - MRI abdomen 6/7/18: LI-RADS 3 lesions right hepatic dome and lat. segment L lobe. Rec 4 mo f/u. Gallbladder: gallstones. 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 Renal abnormality resolved - may have been inflammation or infection.\par \par - EGD 5/27/218: large varices, completely eradicated, banded. clotted blood in stomach.\par Last colonoscopy: ~2014, result "ok"\par \par Last alcohol: 5/27/18 when hospitalized.

## 2019-03-26 NOTE — ASSESSMENT
[FreeTextEntry1] : Mr. TIPTON is a 78 year old man\par \par - Alcoholic/WALL cirrhosis, obesity BMI 32;  MELD \par - variceal bleed 8/2018, banded. Last EGD on 2/27/19: no varices. 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 If positive, will do colonoscopy.\par - HCC screening: CT now as US was nondiagnostic. \par - dyspepsia: continue PPI\par - constipation: continue lactulose, take TID. 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

## 2019-03-26 NOTE — CONSULT LETTER
[Dear  ___] : Dear  [unfilled], [Courtesy Letter:] : I had the pleasure of seeing your patient, [unfilled], in my office today. [Please see my note below.] : Please see my note below. [Referral Closing:] : Thank you very much for seeing this patient.  If you have any questions, please do not hesitate to contact me. [Sincerely,] : Sincerely, [FreeTextEntry2] : Brian Pizarro (PCP) [FreeTextEntry3] : Richie Mcgraw MD\par , [facility]\par General Hepatology and Gastroenterology\par [facility]\par [facility]\par [address] [place] [address]\[place] [address]\par [phone]\par

## 2019-04-15 ENCOUNTER — APPOINTMENT (OUTPATIENT)
Dept: OPHTHALMOLOGY | Facility: CLINIC | Age: 79
End: 2019-04-15
Payer: MEDICARE

## 2019-04-15 DIAGNOSIS — H35.3131 NONEXUDATIVE AGE-RELATED MACULAR DEGENERATION, BILATERAL, EARLY DRY STAGE: ICD-10-CM

## 2019-04-15 DIAGNOSIS — H43.813 VITREOUS DEGENERATION, BILATERAL: ICD-10-CM

## 2019-04-15 DIAGNOSIS — Z96.1 PRESENCE OF INTRAOCULAR LENS: ICD-10-CM

## 2019-04-15 DIAGNOSIS — H35.373 PUCKERING OF MACULA, BILATERAL: ICD-10-CM

## 2019-04-15 PROCEDURE — 92004 COMPRE OPH EXAM NEW PT 1/>: CPT

## 2019-04-15 PROCEDURE — 92134 CPTRZ OPH DX IMG PST SGM RTA: CPT

## 2019-04-15 PROCEDURE — 92225: CPT | Mod: RT

## 2019-05-21 ENCOUNTER — FORM ENCOUNTER (OUTPATIENT)
Age: 79
End: 2019-05-21

## 2019-05-22 ENCOUNTER — APPOINTMENT (OUTPATIENT)
Dept: CT IMAGING | Facility: CLINIC | Age: 79
End: 2019-05-22
Payer: MEDICARE

## 2019-05-22 ENCOUNTER — OUTPATIENT (OUTPATIENT)
Dept: OUTPATIENT SERVICES | Facility: HOSPITAL | Age: 79
LOS: 1 days | End: 2019-05-22
Payer: MEDICARE

## 2019-05-22 DIAGNOSIS — K70.30 ALCOHOLIC CIRRHOSIS OF LIVER WITHOUT ASCITES: ICD-10-CM

## 2019-05-22 DIAGNOSIS — Z98.890 OTHER SPECIFIED POSTPROCEDURAL STATES: Chronic | ICD-10-CM

## 2019-05-22 PROCEDURE — 82565 ASSAY OF CREATININE: CPT

## 2019-05-22 PROCEDURE — 74160 CT ABDOMEN W/CONTRAST: CPT | Mod: 26

## 2019-05-22 PROCEDURE — 74160 CT ABDOMEN W/CONTRAST: CPT

## 2019-05-31 NOTE — ED PROVIDER NOTE - TEMPLATE, MLM
Problem: Delayed Labor Progression (Labor)  Goal: Effective Progression to Delivery  Outcome: Improving  Note:   Pitocin started at 0740  SVE 0845 Making cervical change.     
Data: Patient presented to [facility] at 1149.   Reason for maternal/fetal assessment per patient is Rule Out Labor  .  Patient is a . Prenatal record reviewed.      OB History    Para Term  AB Living   1 0 0 0 0 0   SAB TAB Ectopic Multiple Live Births   0 0 0 0 0      # Outcome Date GA Lbr Mino/2nd Weight Sex Delivery Anes PTL Lv   1 Current            . Medical history:   Past Medical History:   Diagnosis Date     Diabetes (H)     Gestational diabetes diet controlled     Thyroid disease    . Gestational Age 40w5d. VSS. Fetal movement present. Patient denies cramping, backache, vaginal discharge, pelvic pressure, UTI symptoms, GI problems, bloody show, vaginal bleeding, edema, headache, visual disturbances, epigastric or URQ pain, abdominal pain, rupture of membranes. Support persons are present.   Action: Verbal consent for EFM. Triage assessment completed. EFM applied for FHT. Uterine assessment michael every 6-8 min.  SVE FT/70/-1. Fetal assessment: Presumed adequate fetal oxygenation documented (see flow record).   Walked and birthing ball for 90 min.  SVE by CNMW 2CM.  Pt to be admitted.  Response: Sreekanth BEAN at bedside Plan per provider is admit. Patient verbalized agreement with plan. Patient transferred to room 215 ambulatory, oriented to room and call light. Report given to Emiliano MCKEON.    IV hydration for   IV start time   IV stop time Face to Face time:    
Data: Rocio Elkins transferred to 414 via wheelchair at 0030. Baby transferred via crib.  Action: Receiving unit notified of transfer: Yes. Patient and family notified of room change. Report given to Negrita FRANKEL RN at 0030. Belongings sent to receiving unit. Accompanied by Registered Nurse. Oriented patient to surroundings. Call light within reach. ID bands double-checked with receiving RN.  Response: Patient tolerated transfer and is stable.  
Data: Vital signs within normal limits. Postpartum checks within normal limits - see flow record. Patient eating and drinking normally. Patient able to empty bladder independently and is up ambulating. No apparent signs of infection. Perineum  healing well. Patient performing self cares and is able to care for infant.  Action: Patient medicated during the shift for pain. See MAR. Patient reassessed within 1 hour after each medication and pain was improved - patient stated she was comfortable. Patient education done. See flow record.  Response: Positive attachment behaviors observed with infant. Support persons is present.   Plan: Anticipate discharge on 5/31.    
Discharge Planner PT   Patient plan for discharge: home  Current status: PT orders received, PT screen completed. Pt is 35 y.o. F [facility]. Pt with pain L LE and difficulty ambulating after delivery, improved symptoms and now up independently with some continued pain. Pt lives with spouse in apartment with 1 flight of stairs to enter, R rail. Pt demonstrated mod ind bed mobility from flat bed, needs extra time. Ind sit<>stand; Mod ind ambulation, tentative with reduced pace. Performed 10 stairs R rail with supervision. Pt reports pain with ambulation. Pt demonstrates no IP PT needs at this time; may benefit from OP PT if symptoms persist. Will sign off.  Barriers to return to prior living situation: none at this time  Recommendations for discharge: Home with spouse to provide supervision for stairs; Pt may benefit from OP PT if symptoms persist  Rationale for recommendations: Pt demonstrates mod ind mobility at this time; safe with increased time and tentative movements. Pt safe to return home with supervision of spouse for stairs. Pt may benefit from OP PT if symptoms persist.        Entered by: Nieves Pizano 05/31/2019 2:00 PM       
Epidural rebolus per Dr. Thurman  Good relief see flow sheet for pain assesment  
Making continuous slow changes with SVE.    SVE by Francisco pt counseled on labor progression per CNMW  
Patient ambulation improving since yesterday, independent.  Patient still having some mild to moderate pain in left upper leg.  Physical therapy evaluated her this afternoon.  Patient has good family support at home.  Having adequate pain control with tylenol and ibuprofen PO.  Prescriptions given for home.  Prescription for breast pump given per patient request.  Discharge instructions explained and given to patient and all questions/concerns addressed.    
Patient complains of left hip pain and having difficulty moving left leg.  Patient complains of some numbness in leg as well.  Stand by assist x 2 to bathroom, able to ambulate slowly while locking left leg.  Voiding adequate amounts.  IV leaking while trying to flush, IV removed.  Hemoglobin 10.1.  Paged midwife to update on status of patient and see when they are coming to evaluate her.  Assisting with breast feeding positioning.  Will continue to monitor.   
Pt's BP running slightly low overnight. BP machine had a hard time reading BP and had to reset several times.  Pt very diaphoretic, yet shivering and asking for warm blankets. OT checked which was 142. Pt denies pain. Pt still unable to ambulate on own. Pt up to bathroom with assist of 2 plus the arvind steady. Pt still feeling some numbness in left leg. When pt was up to the bathroom, pt stated that she had a burning feeling in her head. Pt did faint downstairs in labor when attempting to get up to the bathroom for the first time. Informed pt to call when needing to get up to use the bathroom. Pt attempted to breastfeed baby with assistance. Baby not very interested and only suckled a few times with the nipple shield. When baby was due to eat again, pt requested baby to be fed with formula because pt stated she wasn't feeling well. Will continue to monitor. Encouraged pt to call with any needs or questions.  
Pt. admitted from L&D via wheelchair and transferred to bed with assist of 2. Pt. arrived with baby and was accompanied by  and arrived with personal belongings. Report was taken from MIKE Styles in L&D. VSS. Fundus is firm and midline.  Vaginal bleeding is small.  SL in place. Pt. oriented to the room and call light system.  
Report from Wander MCKEON- transferred to room 215.  
Report to Jose MCKEON  
Unable to waste epidural CADD, Fent/Ropiv, through Pyxis.     Lauren Barrios RN witnessed waste of 17.1cc  
VSS.  Pain well controlled, requesting prn pain medications as needed.  Up independently in room, just needs assistance getting LLE back into bed, still complaining of sharp/shooting pain in the L hip/leg.  Working on breastfeeding  and  cares. Continue to monitor and notify MD as needed.   
Abdominal Pain, N/V/D

## 2019-06-22 ENCOUNTER — LABORATORY RESULT (OUTPATIENT)
Age: 79
End: 2019-06-22

## 2019-06-24 LAB
ALBUMIN SERPL ELPH-MCNC: 3.7 G/DL
ALP BLD-CCNC: 116 U/L
ALT SERPL-CCNC: 12 U/L
ANION GAP SERPL CALC-SCNC: 10 MMOL/L
AST SERPL-CCNC: 16 U/L
BASOPHILS # BLD AUTO: 0.07 K/UL
BASOPHILS NFR BLD AUTO: 1.6 %
BILIRUB SERPL-MCNC: 0.5 MG/DL
BUN SERPL-MCNC: 26 MG/DL
CALCIUM SERPL-MCNC: 9.3 MG/DL
CHLORIDE SERPL-SCNC: 110 MMOL/L
CHOLEST SERPL-MCNC: 176 MG/DL
CHOLEST/HDLC SERPL: 3 RATIO
CO2 SERPL-SCNC: 20 MMOL/L
CREAT SERPL-MCNC: 1.67 MG/DL
EOSINOPHIL # BLD AUTO: 0.43 K/UL
EOSINOPHIL NFR BLD AUTO: 9.7 %
ESTIMATED AVERAGE GLUCOSE: 114 MG/DL
FERRITIN SERPL-MCNC: 21 NG/ML
GGT SERPL-CCNC: 85 U/L
GLUCOSE SERPL-MCNC: 119 MG/DL
HBA1C MFR BLD HPLC: 5.6 %
HCT VFR BLD CALC: 38.7 %
HDLC SERPL-MCNC: 58 MG/DL
HGB BLD-MCNC: 11.7 G/DL
IMM GRANULOCYTES NFR BLD AUTO: 2.3 %
INR PPP: 1.41 RATIO
IRON SATN MFR SERPL: 15 %
IRON SERPL-MCNC: 57 UG/DL
LDLC SERPL CALC-MCNC: 103 MG/DL
LYMPHOCYTES # BLD AUTO: 1.11 K/UL
LYMPHOCYTES NFR BLD AUTO: 25 %
MAN DIFF?: NORMAL
MCHC RBC-ENTMCNC: 24.6 PG
MCHC RBC-ENTMCNC: 30.2 GM/DL
MCV RBC AUTO: 81.3 FL
MONOCYTES # BLD AUTO: 0.48 K/UL
MONOCYTES NFR BLD AUTO: 10.8 %
NEUTROPHILS # BLD AUTO: 2.25 K/UL
NEUTROPHILS NFR BLD AUTO: 50.6 %
PLATELET # BLD AUTO: 182 K/UL
POTASSIUM SERPL-SCNC: 5 MMOL/L
PROT SERPL-MCNC: 7.1 G/DL
PT BLD: 16.3 SEC
RBC # BLD: 4.76 M/UL
RBC # FLD: 16.6 %
SODIUM SERPL-SCNC: 140 MMOL/L
TIBC SERPL-MCNC: 385 UG/DL
TRIGL SERPL-MCNC: 73 MG/DL
UIBC SERPL-MCNC: 328 UG/DL
WBC # FLD AUTO: 4.44 K/UL

## 2019-07-02 ENCOUNTER — APPOINTMENT (OUTPATIENT)
Dept: HEPATOLOGY | Facility: CLINIC | Age: 79
End: 2019-07-02
Payer: MEDICARE

## 2019-07-02 VITALS
TEMPERATURE: 97.7 F | DIASTOLIC BLOOD PRESSURE: 67 MMHG | WEIGHT: 202 LBS | SYSTOLIC BLOOD PRESSURE: 149 MMHG | BODY MASS INDEX: 33.66 KG/M2 | HEIGHT: 65 IN | RESPIRATION RATE: 16 BRPM | HEART RATE: 54 BPM

## 2019-07-02 PROCEDURE — 99215 OFFICE O/P EST HI 40 MIN: CPT

## 2019-07-02 NOTE — HISTORY OF PRESENT ILLNESS
[Alcohol Abuse] : alcohol abuse [Malaise] : denies malaise [Fever] : denies fever [Diffuse Joint Pain (Arthralgias)] : arthralgias stable [Muscle Aches, Generalized (Myalgias)] : denies myalgias [Abdominal Pain] : denies abdominal pain [Yellow Skin Or Eyes (Jaundice)] : denies jaundice [Urine Tests Nonspecific Abnormal Findings Biliuria] : denies dark urine [Light Colored Bowel Movement (Acholic Stools)] : denies pale stools [Insomnia] : insomnia stable [Skin: Rash] : denies rash [Itching (Pruritus)] : denies pruritus [Shortness Of Breath] : denies shortness of breath [Needlestick Exposure] : no needlestick exposure [Infected Sexual Partner] : no infected sexual partner [IV Drug Use] : no IV drug use [Tattoo] : no tattoos [Body Piercing] : no body piercing [Hemodialysis] : no hemodialysis [Transfusion before 1992] : no transfusion before 1992 [Transplant before 1992] : no transplant before 1992 [Incarceration] : no incarceration [Household Contact to HBV] : no household contact to HBV [Autoimmune Disorder] : no autoimmune disorder [Travel to Endemic Area] : no travel to an endemic area [Occupational Exposure] : no occupational exposure [Cocaine Use] : no cocaine use [Wt Loss ___ Lbs] : no recent weight loss [Wt Gain ___ Lbs] : no recent weight gain [de-identified] : Mr. 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 He was discharged with Propranolol 20 mg bid, lactulose 10 mg/d for constipation - confused years ago.\par \par - 3/26/19: doing well, rib pain has resolved. Is taking laculose 10 g 1 to 3 times daily for constipation, also propranolol 10 bid, pantoprazole 40 for GERD. Lost 4 lbs, BMI still 31.95. US was non-diagnostic. EGD showed no varices.\par - 2/12/19: reports resolved dyspepsia (pain on empty stomach), takes lactulose three times per day for constipation, has 1 BM/d. No edema, not on diuretics.\par - 1/7/18: here one month early for left-sided abdominal pain that started 1 week ago. He as apparently visibly bloated with abdominal distension yesterday - him and wife report that is is not there anymore today. He stopped PPI last visit. Pain occurs on empty stomach. He also feels gas, improved when he did take some pantoprazole that he still had, after 30 minutes or after drinking milk. Takes lactulose BID instead of TID, is unsure of BM - had some small BM 1 or 2 days ago. \par \par - 11/5/18: doing well, last alcohol 8/2018. Takes Advil for hip pain.\par \par Workup:\par - 6/24/19 FOBT: negative\par - 5/22/19 CT abdomen: advanced cirrhosis, no focal lesion, distal common duct calculus also visualized in retrospect on MRI 9/13/19, partial porcelain ggallbladder and gallstones. Spleen normal, \par - 3/5/19 US abdomen: poor visualization. Cirrhosis. Grossly, no obvious liver lesion. No ascites. Normal spleen; stenosis and calcification origin SMA. 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 Scar in lower esophagus from prior banding. Mild GAVE. Arytenoid edema. Repeat in 1 year. \par - MRI 9/13/18 (gadavist): liver lesions not identified. Cholelithiasis, renal mass resolved - was possibly inflammatory.  3.9 cm abdominal wall lesion (right anteriorly), no signif change from 5/2018. May be hematoma.\par - EGD 9/12/18: esophagus: no varices. Scars from prior banding. stomach: portal HTN gastropathy, erythema. Biopsied. Duodenum: normal. \par - CT 5/25/18: cholelithiasis and porcelain GB, renal lesion right lower pole\par - MRI abdomen 6/7/18: LI-RADS 3 lesions right hepatic dome and lat. segment L lobe. Rec 4 mo f/u. Gallbladder: gallstones. 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 Renal abnormality resolved - may have been inflammation or infection.\par \par - EGD 5/27/218: large varices, completely eradicated, banded. clotted blood in stomach.\par Last colonoscopy: ~2014, result "ok"\par \par Last alcohol: 5/27/18 when hospitalized.

## 2019-07-02 NOTE — CONSULT LETTER
[Dear  ___] : Dear  [unfilled], [Courtesy Letter:] : I had the pleasure of seeing your patient, [unfilled], in my office today. [Please see my note below.] : Please see my note below. [Referral Closing:] : Thank you very much for seeing this patient.  If you have any questions, please do not hesitate to contact me. [Sincerely,] : Sincerely, [FreeTextEntry2] : Brian Pizarro (PCP) [FreeTextEntry3] : Richie Mcgraw MD\par , [facility]\par General Hepatology and Gastroenterology\par [facility]\par [facility]\par [address] [place] [address]\[place] [address]\par [phone]\par

## 2019-07-02 NOTE — ASSESSMENT
[FreeTextEntry1] : Mr. TIPTON is a 78 year old man with\par \par - Alcoholic/WALL cirrhosis, obesity BMI 32;  MELD 15 on 7/2/19\par - variceal bleed 8/2018, banded. Last EGD on 2/27/19: no varices. 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 As of 6/22/19, his postoperative mortality is estimated to be 37% within 90 days of surgery (age 79, ASA IV, bilirubin <1, creat 1.67, INR 1.41)\par - rectus sheath mass without growth between 9/2018 and 5/2019\par - too old for liver transplant\par \par Plan:\par - h/o variceal bleed: repeat EGD after 6 months, in September, continue propranolol 10 mg bid\par - iron-deficiency anemia, FOBT negative.\par - HCC screening: next in November \par - dyspepsia: continue PPI\par - constipation: continue lactulose, take TID. If pain does not resolve on PPI, and he continues to have bloating with pain, will switch to another medication.\par - continue propranolol 10 mg bid\par - continue alcohol abstinence\par - iron-deficiency anemia: colonoscopy together with EGD in September\par - gallstones, porcelain gallbladder: surgery too risky\par \par \par \par - return in 3 months\par

## 2019-07-02 NOTE — PHYSICAL EXAM
[Edema] : there was no peripheral edema [Abdomen Soft] : soft [Bowel Sounds] : normal bowel sounds [Abdomen Tenderness] : non-tender [] : no hepato-splenomegaly [Abdomen Mass (___ Cm)] : no abdominal mass palpated [FreeTextEntry1] : no asterixis

## 2019-09-11 ENCOUNTER — OUTPATIENT (OUTPATIENT)
Dept: OUTPATIENT SERVICES | Facility: HOSPITAL | Age: 79
LOS: 1 days | Discharge: HOME | End: 2019-09-11
Payer: MEDICARE

## 2019-09-11 ENCOUNTER — RESULT REVIEW (OUTPATIENT)
Age: 79
End: 2019-09-11

## 2019-09-11 ENCOUNTER — APPOINTMENT (OUTPATIENT)
Dept: HEPATOLOGY | Facility: HOSPITAL | Age: 79
End: 2019-09-11

## 2019-09-11 DIAGNOSIS — Z98.890 OTHER SPECIFIED POSTPROCEDURAL STATES: Chronic | ICD-10-CM

## 2019-09-11 DIAGNOSIS — D50.9 IRON DEFICIENCY ANEMIA, UNSPECIFIED: ICD-10-CM

## 2019-09-11 PROCEDURE — 45382 COLONOSCOPY W/CONTROL BLEED: CPT | Mod: GC

## 2019-09-11 PROCEDURE — 88312 SPECIAL STAINS GROUP 1: CPT | Mod: 26

## 2019-09-11 PROCEDURE — 88305 TISSUE EXAM BY PATHOLOGIST: CPT | Mod: 26

## 2019-09-11 PROCEDURE — 43239 EGD BIOPSY SINGLE/MULTIPLE: CPT

## 2019-09-11 RX ORDER — SODIUM CHLORIDE 9 MG/ML
500 INJECTION, SOLUTION INTRAVENOUS
Refills: 0 | Status: DISCONTINUED | OUTPATIENT
Start: 2019-09-11 | End: 2019-10-04

## 2019-09-11 NOTE — ASSESSMENT
[FreeTextEntry1] : - 9/11/19 colonoscopy: 2 medium-sized AVMs and several small ones ascending colon, 2 small AVMs in rectum, all APC'd. Small rectal varices, small internal hemorrhoids. Excellent prep.

## 2019-10-02 LAB
ALBUMIN SERPL ELPH-MCNC: 3.8 G/DL
ALP BLD-CCNC: 166 U/L
ALT SERPL-CCNC: 15 U/L
ANION GAP SERPL CALC-SCNC: 14 MMOL/L
AST SERPL-CCNC: 26 U/L
BASOPHILS # BLD AUTO: 0.1 K/UL
BASOPHILS NFR BLD AUTO: 1.5 %
BILIRUB SERPL-MCNC: 0.4 MG/DL
BUN SERPL-MCNC: 27 MG/DL
CALCIUM SERPL-MCNC: 9.8 MG/DL
CHLORIDE SERPL-SCNC: 108 MMOL/L
CO2 SERPL-SCNC: 17 MMOL/L
CREAT SERPL-MCNC: 1.62 MG/DL
EOSINOPHIL # BLD AUTO: 0.59 K/UL
EOSINOPHIL NFR BLD AUTO: 8.9 %
FERRITIN SERPL-MCNC: 41 NG/ML
GLUCOSE SERPL-MCNC: 98 MG/DL
HCT VFR BLD CALC: 39 %
HGB BLD-MCNC: 11.6 G/DL
IMM GRANULOCYTES NFR BLD AUTO: 1.4 %
INR PPP: 1.38 RATIO
IRON SATN MFR SERPL: 22 %
IRON SERPL-MCNC: 75 UG/DL
LYMPHOCYTES # BLD AUTO: 1.53 K/UL
LYMPHOCYTES NFR BLD AUTO: 23 %
MAN DIFF?: NORMAL
MCHC RBC-ENTMCNC: 24.7 PG
MCHC RBC-ENTMCNC: 29.7 GM/DL
MCV RBC AUTO: 83 FL
MONOCYTES # BLD AUTO: 0.68 K/UL
MONOCYTES NFR BLD AUTO: 10.2 %
NEUTROPHILS # BLD AUTO: 3.65 K/UL
NEUTROPHILS NFR BLD AUTO: 55 %
PLATELET # BLD AUTO: 258 K/UL
POTASSIUM SERPL-SCNC: 5.2 MMOL/L
PROT SERPL-MCNC: 7.3 G/DL
PT BLD: 16 SEC
RBC # BLD: 4.7 M/UL
RBC # FLD: 18 %
SODIUM SERPL-SCNC: 139 MMOL/L
TIBC SERPL-MCNC: 334 UG/DL
UIBC SERPL-MCNC: 259 UG/DL
WBC # FLD AUTO: 6.64 K/UL

## 2019-10-08 ENCOUNTER — APPOINTMENT (OUTPATIENT)
Dept: HEPATOLOGY | Facility: CLINIC | Age: 79
End: 2019-10-08
Payer: MEDICARE

## 2019-10-08 VITALS
BODY MASS INDEX: 34.16 KG/M2 | DIASTOLIC BLOOD PRESSURE: 71 MMHG | HEIGHT: 65 IN | WEIGHT: 205 LBS | OXYGEN SATURATION: 94 % | SYSTOLIC BLOOD PRESSURE: 128 MMHG | TEMPERATURE: 97.9 F | HEART RATE: 68 BPM

## 2019-10-08 DIAGNOSIS — K59.09 OTHER CONSTIPATION: ICD-10-CM

## 2019-10-08 PROCEDURE — 99215 OFFICE O/P EST HI 40 MIN: CPT

## 2019-10-08 RX ORDER — PANTOPRAZOLE 40 MG/1
40 TABLET, DELAYED RELEASE ORAL DAILY
Qty: 90 | Refills: 0 | Status: DISCONTINUED | COMMUNITY
Start: 2019-01-07 | End: 2019-10-08

## 2019-10-08 NOTE — PHYSICAL EXAM
[Edema] : there was no peripheral edema [Bowel Sounds] : normal bowel sounds [Abdomen Soft] : soft [Abdomen Tenderness] : non-tender [] : no hepato-splenomegaly [Abdomen Mass (___ Cm)] : no abdominal mass palpated [FreeTextEntry1] : no asterixis

## 2019-10-08 NOTE — HISTORY OF PRESENT ILLNESS
[Alcohol Abuse] : alcohol abuse [Malaise] : denies malaise [Fever] : denies fever [Diffuse Joint Pain (Arthralgias)] : arthralgias stable [Abdominal Pain] : denies abdominal pain [Muscle Aches, Generalized (Myalgias)] : denies myalgias [Yellow Skin Or Eyes (Jaundice)] : denies jaundice [Urine Tests Nonspecific Abnormal Findings Biliuria] : denies dark urine [Light Colored Bowel Movement (Acholic Stools)] : denies pale stools [Insomnia] : insomnia stable [Skin: Rash] : denies rash [Shortness Of Breath] : denies shortness of breath [Itching (Pruritus)] : denies pruritus [Needlestick Exposure] : no needlestick exposure [Infected Sexual Partner] : no infected sexual partner [IV Drug Use] : no IV drug use [Tattoo] : no tattoos [Body Piercing] : no body piercing [Hemodialysis] : no hemodialysis [Transfusion before 1992] : no transfusion before 1992 [Transplant before 1992] : no transplant before 1992 [Incarceration] : no incarceration [Autoimmune Disorder] : no autoimmune disorder [Household Contact to HBV] : no household contact to HBV [Travel to Endemic Area] : no travel to an endemic area [Occupational Exposure] : no occupational exposure [Cocaine Use] : no cocaine use [Wt Gain ___ Lbs] : no recent weight gain [Wt Loss ___ Lbs] : no recent weight loss [de-identified] : Mr. 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 He was discharged with Propranolol 20 mg bid, lactulose 10 mg/d for constipation - confused years ago.\par \par - 3/26/19: doing well, rib pain has resolved. Is taking laculose 10 g 1 to 3 times daily for constipation, also propranolol 10 bid, pantoprazole 40 for GERD. Lost 4 lbs, BMI still 31.95. US was non-diagnostic. EGD showed no varices.\par - 2/12/19: reports resolved dyspepsia (pain on empty stomach), takes lactulose three times per day for constipation, has 1 BM/d. No edema, not on diuretics.\par - 1/7/18: here one month early for left-sided abdominal pain that started 1 week ago. He as apparently visibly bloated with abdominal distension yesterday - him and wife report that is is not there anymore today. He stopped PPI last visit. Pain occurs on empty stomach. He also feels gas, improved when he did take some pantoprazole that he still had, after 30 minutes or after drinking milk. Takes lactulose BID instead of TID, is unsure of BM - had some small BM 1 or 2 days ago. \par \par - 11/5/18: doing well, last alcohol 8/2018. Takes Advil for hip pain.\par \par Workup:\par - 9/11/19 EGD: small esophageal varices, too small to band. Normal stomach, biopsied. Semi-circular ring of granular, erythematous mucosa 1st portion of duodenum, biopsied. Path: chronic peptic duodenitis, chronic inactive gastritis with rare H. pylori on Warthin-Starry stain.\par - 9/11/19 colonoscopy: two medium AVMs and several small ones in rectum and cecum - cauterized with APC. Medium-sized internal hemorrhoids. Excellent prep.\par - 6/24/19 FOBT: negative\par - 5/22/19 CT abdomen: advanced cirrhosis, no focal lesion, distal common duct calculus also visualized in retrospect on MRI 9/13/19, partial porcelain gallbladder and gallstones. Spleen normal, \par - 3/5/19 US abdomen: poor visualization. Cirrhosis. Grossly, no obvious liver lesion. No ascites. Normal spleen; stenosis and calcification origin SMA. 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 Scar in lower esophagus from prior banding. Mild GAVE. Arytenoid edema. Repeat in 1 year. \par - MRI 9/13/18 (gadavist): liver lesions not identified. Cholelithiasis, renal mass resolved - was possibly inflammatory.  3.9 cm abdominal wall lesion (right anteriorly), no signif change from 5/2018. May be hematoma.\par - EGD 9/12/18: esophagus: no varices. Scars from prior banding. stomach: portal HTN gastropathy, erythema. Biopsied. Duodenum: normal. \par - CT 5/25/18: cholelithiasis and porcelain GB, renal lesion right lower pole\par - MRI abdomen 6/7/18: LI-RADS 3 lesions right hepatic dome and lat. segment L lobe. Rec 4 mo f/u. Gallbladder: gallstones. 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 Renal abnormality resolved - may have been inflammation or infection.\par \par - EGD 5/27/18: large varices, completely eradicated, banded. clotted blood in stomach.\par Last colonoscopy: ~2014, result "ok"\par \par Last alcohol: 5/27/18 when hospitalized.

## 2019-10-08 NOTE — ASSESSMENT
[FreeTextEntry1] : Mr. TIPTON is a 79 year old man with\par \par - decompensated alcoholic/WALL cirrhosis, h/o variceal bleed, controlled encephalopathy; MELD 15 on 7/2/19\par - obesity BMI 32  \par - variceal bleed 8/2018, banded. Last EGD on 9/2019: small varices, not banded. Is on propranolol 10 mg bid.\par - encephalopathy: none - had some years ago, since then on lactulose once or twice daily for constipation\par - ascites: none; leg edema resolved even without diuretics\par - HCC: negative CT 5/2019\par - CKD 3, creatinine 1.6\par - normocytic anemia Hb 12, stable - likely combination of renal , iron deficiency and chronic inflammation; s/p APC of colonic AVMs in 9/2019. \par - H. 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 pylori-assoc. duodenitis, and mild chronic gastritis, iron deficiency, mixed anemia: triple therapy; H. pylori stool Ag before next visit in 3 months; stop PPI\par - HCC screening: next in November. Pt. will call after the exam\par \par - constipation: continue lactulose, take TID.\par \par - continue alcohol abstinence\par \par - gallstones, porcelain gallbladder: calculated mortality too high for removal\par

## 2019-10-08 NOTE — CONSULT LETTER
[Dear  ___] : Dear  [unfilled], [Courtesy Letter:] : I had the pleasure of seeing your patient, [unfilled], in my office today. [Referral Closing:] : Thank you very much for seeing this patient.  If you have any questions, please do not hesitate to contact me. [Please see my note below.] : Please see my note below. [Sincerely,] : Sincerely, [FreeTextEntry2] : Brian Pizarro (PCP) [FreeTextEntry3] : Richie Mcgraw MD\par , [facility]\par General Hepatology and Gastroenterology\par [facility]\par [facility]\par [address] [place] [address]\[place] [address]\par [phone]\par

## 2019-10-28 ENCOUNTER — INPATIENT (INPATIENT)
Facility: HOSPITAL | Age: 79
LOS: 10 days | Discharge: NURSING, REHAB OR LONG-TERM CARE | End: 2019-11-08
Attending: HOSPITALIST | Admitting: HOSPITALIST
Payer: MEDICARE

## 2019-10-28 VITALS
SYSTOLIC BLOOD PRESSURE: 111 MMHG | HEART RATE: 130 BPM | RESPIRATION RATE: 20 BRPM | OXYGEN SATURATION: 98 % | DIASTOLIC BLOOD PRESSURE: 52 MMHG | TEMPERATURE: 98 F

## 2019-10-28 DIAGNOSIS — K80.50 CALCULUS OF BILE DUCT WITHOUT CHOLANGITIS OR CHOLECYSTITIS WITHOUT OBSTRUCTION: ICD-10-CM

## 2019-10-28 DIAGNOSIS — Z98.890 OTHER SPECIFIED POSTPROCEDURAL STATES: Chronic | ICD-10-CM

## 2019-10-28 LAB
ALBUMIN SERPL ELPH-MCNC: 3.6 G/DL — SIGNIFICANT CHANGE UP (ref 3.3–5)
ALP SERPL-CCNC: 147 U/L — HIGH (ref 40–120)
ALT FLD-CCNC: 27 U/L — SIGNIFICANT CHANGE UP (ref 4–41)
AMMONIA BLD-MCNC: SIGNIFICANT CHANGE UP UMOL/L (ref 11–55)
ANION GAP SERPL CALC-SCNC: 18 MMO/L — HIGH (ref 7–14)
APAP SERPL-MCNC: < 15 UG/ML — LOW (ref 15–25)
APTT BLD: 33 SEC — SIGNIFICANT CHANGE UP (ref 27.5–36.3)
AST SERPL-CCNC: 43 U/L — HIGH (ref 4–40)
BASE EXCESS BLDV CALC-SCNC: -1.8 MMOL/L — SIGNIFICANT CHANGE UP
BASE EXCESS BLDV CALC-SCNC: -3.5 MMOL/L — SIGNIFICANT CHANGE UP
BASOPHILS # BLD AUTO: 0.02 K/UL — SIGNIFICANT CHANGE UP (ref 0–0.2)
BASOPHILS NFR BLD AUTO: 0.1 % — SIGNIFICANT CHANGE UP (ref 0–2)
BILIRUB DIRECT SERPL-MCNC: 0.2 MG/DL — SIGNIFICANT CHANGE UP (ref 0.1–0.2)
BILIRUB SERPL-MCNC: 0.8 MG/DL — SIGNIFICANT CHANGE UP (ref 0.2–1.2)
BLOOD GAS VENOUS - CREATININE: 1.48 MG/DL — HIGH (ref 0.5–1.3)
BLOOD GAS VENOUS - CREATININE: 1.54 MG/DL — HIGH (ref 0.5–1.3)
BUN SERPL-MCNC: 40 MG/DL — HIGH (ref 7–23)
CALCIUM SERPL-MCNC: 9.8 MG/DL — SIGNIFICANT CHANGE UP (ref 8.4–10.5)
CHLORIDE BLDV-SCNC: 103 MMOL/L — SIGNIFICANT CHANGE UP (ref 96–108)
CHLORIDE BLDV-SCNC: 104 MMOL/L — SIGNIFICANT CHANGE UP (ref 96–108)
CHLORIDE SERPL-SCNC: 96 MMOL/L — LOW (ref 98–107)
CO2 SERPL-SCNC: 18 MMOL/L — LOW (ref 22–31)
CREAT SERPL-MCNC: 1.55 MG/DL — HIGH (ref 0.5–1.3)
EOSINOPHIL # BLD AUTO: 0 K/UL — SIGNIFICANT CHANGE UP (ref 0–0.5)
EOSINOPHIL NFR BLD AUTO: 0 % — SIGNIFICANT CHANGE UP (ref 0–6)
ETHANOL BLD-MCNC: < 10 MG/DL — SIGNIFICANT CHANGE UP
GAS PNL BLDV: 130 MMOL/L — LOW (ref 136–146)
GAS PNL BLDV: 135 MMOL/L — LOW (ref 136–146)
GLUCOSE BLDV-MCNC: 127 MG/DL — HIGH (ref 70–99)
GLUCOSE BLDV-MCNC: 152 MG/DL — HIGH (ref 70–99)
GLUCOSE SERPL-MCNC: 155 MG/DL — HIGH (ref 70–99)
HCO3 BLDV-SCNC: 20 MMOL/L — SIGNIFICANT CHANGE UP (ref 20–27)
HCO3 BLDV-SCNC: 21 MMOL/L — SIGNIFICANT CHANGE UP (ref 20–27)
HCT VFR BLD CALC: 47.7 % — SIGNIFICANT CHANGE UP (ref 39–50)
HCT VFR BLDV CALC: 47.7 % — SIGNIFICANT CHANGE UP (ref 39–51)
HCT VFR BLDV CALC: 47.8 % — SIGNIFICANT CHANGE UP (ref 39–51)
HGB BLD-MCNC: 15.6 G/DL — SIGNIFICANT CHANGE UP (ref 13–17)
HGB BLDV-MCNC: 15.6 G/DL — SIGNIFICANT CHANGE UP (ref 13–17)
HGB BLDV-MCNC: 15.6 G/DL — SIGNIFICANT CHANGE UP (ref 13–17)
IMM GRANULOCYTES NFR BLD AUTO: 1 % — SIGNIFICANT CHANGE UP (ref 0–1.5)
INR BLD: 1.64 — HIGH (ref 0.88–1.17)
LACTATE BLDV-MCNC: 3.5 MMOL/L — HIGH (ref 0.5–2)
LACTATE BLDV-MCNC: 4.4 MMOL/L — CRITICAL HIGH (ref 0.5–2)
LIDOCAIN IGE QN: 36.2 U/L — SIGNIFICANT CHANGE UP (ref 7–60)
LYMPHOCYTES # BLD AUTO: 0.86 K/UL — LOW (ref 1–3.3)
LYMPHOCYTES # BLD AUTO: 6.4 % — LOW (ref 13–44)
MCHC RBC-ENTMCNC: 25.7 PG — LOW (ref 27–34)
MCHC RBC-ENTMCNC: 32.7 % — SIGNIFICANT CHANGE UP (ref 32–36)
MCV RBC AUTO: 78.6 FL — LOW (ref 80–100)
MONOCYTES # BLD AUTO: 0.79 K/UL — SIGNIFICANT CHANGE UP (ref 0–0.9)
MONOCYTES NFR BLD AUTO: 5.9 % — SIGNIFICANT CHANGE UP (ref 2–14)
NEUTROPHILS # BLD AUTO: 11.66 K/UL — HIGH (ref 1.8–7.4)
NEUTROPHILS NFR BLD AUTO: 86.6 % — HIGH (ref 43–77)
NRBC # FLD: 0 K/UL — SIGNIFICANT CHANGE UP (ref 0–0)
PCO2 BLDV: 40 MMHG — LOW (ref 41–51)
PCO2 BLDV: 44 MMHG — SIGNIFICANT CHANGE UP (ref 41–51)
PH BLDV: 7.32 PH — SIGNIFICANT CHANGE UP (ref 7.32–7.43)
PH BLDV: 7.37 PH — SIGNIFICANT CHANGE UP (ref 7.32–7.43)
PLATELET # BLD AUTO: 281 K/UL — SIGNIFICANT CHANGE UP (ref 150–400)
PMV BLD: 9.9 FL — SIGNIFICANT CHANGE UP (ref 7–13)
PO2 BLDV: 25 MMHG — LOW (ref 35–40)
PO2 BLDV: 28 MMHG — LOW (ref 35–40)
POTASSIUM BLDV-SCNC: 4.3 MMOL/L — SIGNIFICANT CHANGE UP (ref 3.4–4.5)
POTASSIUM BLDV-SCNC: 5.7 MMOL/L — HIGH (ref 3.4–4.5)
POTASSIUM SERPL-MCNC: 4.8 MMOL/L — SIGNIFICANT CHANGE UP (ref 3.5–5.3)
POTASSIUM SERPL-SCNC: 4.8 MMOL/L — SIGNIFICANT CHANGE UP (ref 3.5–5.3)
PROT SERPL-MCNC: 8.2 G/DL — SIGNIFICANT CHANGE UP (ref 6–8.3)
PROTHROM AB SERPL-ACNC: 18.5 SEC — HIGH (ref 9.8–13.1)
RBC # BLD: 6.07 M/UL — HIGH (ref 4.2–5.8)
RBC # FLD: 17.8 % — HIGH (ref 10.3–14.5)
SALICYLATES SERPL-MCNC: < 5 MG/DL — LOW (ref 15–30)
SAO2 % BLDV: 39.1 % — LOW (ref 60–85)
SAO2 % BLDV: 41 % — LOW (ref 60–85)
SODIUM SERPL-SCNC: 132 MMOL/L — LOW (ref 135–145)
WBC # BLD: 13.46 K/UL — HIGH (ref 3.8–10.5)
WBC # FLD AUTO: 13.46 K/UL — HIGH (ref 3.8–10.5)

## 2019-10-28 PROCEDURE — 76705 ECHO EXAM OF ABDOMEN: CPT | Mod: 26

## 2019-10-28 PROCEDURE — 74177 CT ABD & PELVIS W/CONTRAST: CPT | Mod: 26

## 2019-10-28 PROCEDURE — 71046 X-RAY EXAM CHEST 2 VIEWS: CPT | Mod: 26

## 2019-10-28 RX ORDER — SODIUM CHLORIDE 9 MG/ML
1000 INJECTION, SOLUTION INTRAVENOUS ONCE
Refills: 0 | Status: COMPLETED | OUTPATIENT
Start: 2019-10-28 | End: 2019-10-28

## 2019-10-28 RX ORDER — SODIUM CHLORIDE 9 MG/ML
1000 INJECTION INTRAMUSCULAR; INTRAVENOUS; SUBCUTANEOUS ONCE
Refills: 0 | Status: COMPLETED | OUTPATIENT
Start: 2019-10-28 | End: 2019-10-28

## 2019-10-28 RX ORDER — INFLUENZA VIRUS VACCINE 15; 15; 15; 15 UG/.5ML; UG/.5ML; UG/.5ML; UG/.5ML
0.5 SUSPENSION INTRAMUSCULAR ONCE
Refills: 0 | Status: DISCONTINUED | OUTPATIENT
Start: 2019-10-28 | End: 2019-11-08

## 2019-10-28 RX ORDER — SODIUM CHLORIDE 9 MG/ML
2000 INJECTION, SOLUTION INTRAVENOUS ONCE
Refills: 0 | Status: DISCONTINUED | OUTPATIENT
Start: 2019-10-28 | End: 2019-10-28

## 2019-10-28 RX ORDER — MORPHINE SULFATE 50 MG/1
4 CAPSULE, EXTENDED RELEASE ORAL ONCE
Refills: 0 | Status: DISCONTINUED | OUTPATIENT
Start: 2019-10-28 | End: 2019-10-28

## 2019-10-28 RX ORDER — HEPARIN SODIUM 5000 [USP'U]/ML
5000 INJECTION INTRAVENOUS; SUBCUTANEOUS EVERY 12 HOURS
Refills: 0 | Status: DISCONTINUED | OUTPATIENT
Start: 2019-10-28 | End: 2019-10-29

## 2019-10-28 RX ADMIN — SODIUM CHLORIDE 1000 MILLILITER(S): 9 INJECTION, SOLUTION INTRAVENOUS at 14:48

## 2019-10-28 RX ADMIN — MORPHINE SULFATE 4 MILLIGRAM(S): 50 CAPSULE, EXTENDED RELEASE ORAL at 14:00

## 2019-10-28 RX ADMIN — MORPHINE SULFATE 4 MILLIGRAM(S): 50 CAPSULE, EXTENDED RELEASE ORAL at 12:15

## 2019-10-28 RX ADMIN — SODIUM CHLORIDE 1000 MILLILITER(S): 9 INJECTION INTRAMUSCULAR; INTRAVENOUS; SUBCUTANEOUS at 12:15

## 2019-10-28 NOTE — ED PROVIDER NOTE - CLINICAL SUMMARY MEDICAL DECISION MAKING FREE TEXT BOX
hx of etoh cirrhosis, porcelain gb, etoh abuse (last drink 1 year ago) hx of esophageal varices, banded 2018 p/w epigastric/RUQ abd pain x 2 days, (+) n, (+) appetite decreased, LNBM 2 days ago, no flatus. Prior surgery hernia repair. Abd not peritoneal, hemodynamically stable other than tachycardia. DDX SBO, pancreatitis, choledocholithiasis/ascending cholangitis, gastric/duodenal ulcer? (no hx of melena), GI bleed, AAA (no pulsatile mass), worsening ascites, SBP. 100rectal temp, will get labs/INR/VBG/CBC/CMP/lipase/ammonia. IVF/pain control. re-eval/treat/dispo accordingly. will consider diagnostic tap if CT/US non-diagnostic.

## 2019-10-28 NOTE — ED PROVIDER NOTE - GASTROINTESTINAL, MLM
Abdomen soft, non-tender, no guarding. (+) RUQ tenderness, (+) epigastric tenderness, no rebound/guarding. (+) BS. (-) ecchymosis

## 2019-10-28 NOTE — ED ADULT NURSE NOTE - OBJECTIVE STATEMENT
Pt AAO x 3 with primary language Thai c/o epigastric pain and RUQ pain x 2 days with nausea. Abdomen is tender and soft upon palpation. Pt denies vomiting diarrhea, irregular rhythm noted on cardiac monitor and pt breathing with ease on room air. Labs sent and 18G placed to the right wrist. Bedside u/s in progress and pt medicated for pain.

## 2019-10-28 NOTE — CONSULT NOTE ADULT - SUBJECTIVE AND OBJECTIVE BOX
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 Described as constant, achy pain, a/w nausea, decreased appetite. LNBM 2 days ago. Not passing flatus. Denies any cp, sob, fevers, vomiting, diarrhea, melena, hematochezia. Sent in by his gastroenterologist.     In the ER he was initially tachycardic to 130s which improved with fluid administration. He was normotensive.  Labs revealed a WBC of 13.5, INR 1.65, Cr 1.5, lactate initially 4.4 but dropped to 3.5 with IVF. Tbili 0.8. Child class A, MELD 20. On exam he was soft, mild subjective tenderness in the epigastrium without rebound or guarding, no murphys sign.  CTAP revealed Cholelithiasis and porcelain gallbladder. Choledocholithiasis with 0.5cm distal stone, with mild dilatation of the common bile duct, cirrhosis, minimal infiltration of the fat adjacent to the pancreatic head and proximal duodenum, raising consideration of mild pancreatitis and/or duodenitis. Of not choledocholithiasis has been seen on prior CTAP from May of this year and septemeber of last year but patient has not had follow up.        PMH/PSH  Liver cirrhosis  Guillain-[place] syndrome  HTN (hypertension)    H/O inguinal hernia repair      MEDICATIONS  heparin  Injectable 5000 Unit(s) SubCutaneous every 12 hours  influenza   Vaccine 0.5 milliLiter(s) IntraMuscular once      Allergies    Allergy Status Unknown  No Known Allergies    Intolerances        Social : lives at home with wife    Physical Exam  T(C): 36.3 (10-28-19 @ 22:57), Max: 37.8 (10-28-19 @ 11:47)  HR: 71 (10-28-19 @ 22:57) (71 - 130)  BP: 145/85 (10-28-19 @ 22:57) (111/52 - 170/90)  RR: 17 (10-28-19 @ 22:57) (15 - 20)  SpO2: 98% (10-28-19 @ 22:57) (98% - 100%)  Wt(kg): --  Tmax: T(C): , Max: 37.8 (10-28-19 @ 11:47)  Wt(kg): --      Gen: NAD  Neuro: AAOx3  HEENT: normocephalic, atraumatic, no scleral icterus  CV: S1, S2, RRR  Pulm: CTA B/L  Abd: soft, mild subjective tenderness in the epigastrium without rebound or guarding, no murphys sign.  Ext: warm, no edema    LABS                        15.6   13.46 )-----------( 281      ( 28 Oct 2019 11:42 )             47.7     10-28    132<L>  |  96<L>  |  40<H>  ----------------------------<  155<H>  4.8   |  18<L>  |  1.55<H>    Ca    9.8      28 Oct 2019 11:42    TPro  8.2  /  Alb  3.6  /  TBili  0.8  /  DBili  0.2  /  AST  43<H>  /  ALT  27  /  AlkPhos  147<H>  10-28    PT/INR - ( 28 Oct 2019 15:01 )   PT: 18.5 SEC;   INR: 1.64          PTT - ( 28 Oct 2019 15:01 )  PTT:33.0 SEC          IMAGING  < from: CT Abdomen and Pelvis w/ Oral Cont and w/ IV Cont (10.28.19 @ 14:20) >    Cholelithiasis and porcelain gallbladder are again noted.    Choledocholithiasis, with mild dilatation of the common bile duct.     Cirrhotic configuration of the liver.    Minimal infiltration of the fat adjacent to the pancreatic head and   proximal duodenum, raising consideration of mild pancreatitis and/or   duodenitis..          < end of copied text >

## 2019-10-28 NOTE — CONSULT NOTE ADULT - ASSESSMENT
79M child class A, MELD 20 cirrhotic patient with choledocholithiasis, possible duodenitis on CTAP.    - No acute surgical intervention   - NPO, IVF  - Recommend GI consult for endoscopy and possible ERCP and stone extraction. Consider brushings as filling defect has been present on cross sectional imaging for two years.   - Consider antibiotics with cefotetan for leukocytosis, tenderness and choledocholithiasis.   - Surgery will follow     D/W Fellow Dr. Davis

## 2019-10-28 NOTE — ED PROVIDER NOTE - OBJECTIVE STATEMENT
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 Described as constant, achy pain, a/w nausea, decreased appetite. LNBM 2 days ago. Not passing flatus. Denies any cp, sob, fevers, vomiting, diarrhea, melena, hematochezia.

## 2019-10-28 NOTE — ED PROVIDER NOTE - PROGRESS NOTE DETAILS
shell leong fellow: Lactate 4. Will fluid hydrate, and re-eval. pending ct ap, bedside us showing heterogenous fluid collection in epigastric region, (+) tenderness. shell leong fellow: pts w/ choledocholithiasis 0.9mm, will contact Dr. Lucien ROBERTS, pending callback, will admit medicine. dr rick reports pt is poor candidate for surgical intervention, recommended obs for abd pain if worsening. he is hemodynamically stable, mentating well, and rdy for admit,.

## 2019-10-28 NOTE — ED PROVIDER NOTE - CONSTITUTIONAL, MLM
normal... ill appearing, malnourished, obese, awake, alert, oriented to person, place, time/situation and in mild anxious distress.

## 2019-10-28 NOTE — ED PROVIDER NOTE - ATTENDING CONTRIBUTION TO CARE
Patient presents to ed with 2 d constant epig/ruq pain, no other radiation, a/w nausea and fatigue, burning, severe. States he hasn't had pain like this before. No fevers, chills, ha, cp, sob, vomiting, diarrhea, dysuria. Hasn't had bm in few days, dec gas.   exam  GEN - NAD; well appearing; A+O x3   HEAD - NC/AT   EYES- PERRL, EOMI  ENT: Airway patent, mmm, Oral cavity and pharynx normal.    NECK: Neck supple  PULMONARY - CTA b/l, symmetric breath sounds.   CARDIAC -s1s2, RRR, no M,G,R  ABDOMEN - +BS, ND, ttp to epigastrium, periumbilical region, ruq, no fluid wave,  soft, no guarding, no rebound, no masses   BACK - no CVA tenderness, Normal  spine   EXTREMITIES - FROM  SKIN - no rash or bruising   NEUROLOGIC - alert, speech clear, no focal deficits  PSYCH -nl mood/affect, nl insight.  a/p-patient presents to the ed with 2d constant epig/ruq pain, dec flatus/bms, h/o cirrhosis/pud, diff dx includes bowel obstruction, diverticulitis, colitis, pancreatitis, cholecystitis. Plan for ruq us, ct a/p, labs, pain ctrl, monitor, reass.

## 2019-10-28 NOTE — ED PROVIDER NOTE - CARE PLAN
Principal Discharge DX:	Choledocholithiasis  Secondary Diagnosis:	Generalized abdominal pain  Secondary Diagnosis:	Unable to eat

## 2019-10-29 DIAGNOSIS — R74.8 ABNORMAL LEVELS OF OTHER SERUM ENZYMES: ICD-10-CM

## 2019-10-29 DIAGNOSIS — N39.0 URINARY TRACT INFECTION, SITE NOT SPECIFIED: ICD-10-CM

## 2019-10-29 DIAGNOSIS — K80.50 CALCULUS OF BILE DUCT WITHOUT CHOLANGITIS OR CHOLECYSTITIS WITHOUT OBSTRUCTION: ICD-10-CM

## 2019-10-29 DIAGNOSIS — K74.60 UNSPECIFIED CIRRHOSIS OF LIVER: ICD-10-CM

## 2019-10-29 DIAGNOSIS — R10.9 UNSPECIFIED ABDOMINAL PAIN: ICD-10-CM

## 2019-10-29 DIAGNOSIS — E87.2 ACIDOSIS: ICD-10-CM

## 2019-10-29 DIAGNOSIS — D62 ACUTE POSTHEMORRHAGIC ANEMIA: ICD-10-CM

## 2019-10-29 DIAGNOSIS — I10 ESSENTIAL (PRIMARY) HYPERTENSION: ICD-10-CM

## 2019-10-29 DIAGNOSIS — Z29.9 ENCOUNTER FOR PROPHYLACTIC MEASURES, UNSPECIFIED: ICD-10-CM

## 2019-10-29 DIAGNOSIS — N18.9 CHRONIC KIDNEY DISEASE, UNSPECIFIED: ICD-10-CM

## 2019-10-29 DIAGNOSIS — R65.10 SYSTEMIC INFLAMMATORY RESPONSE SYNDROME (SIRS) OF NON-INFECTIOUS ORIGIN WITHOUT ACUTE ORGAN DYSFUNCTION: ICD-10-CM

## 2019-10-29 LAB
ALBUMIN SERPL ELPH-MCNC: 3.4 G/DL — SIGNIFICANT CHANGE UP (ref 3.3–5)
ALP SERPL-CCNC: 119 U/L — SIGNIFICANT CHANGE UP (ref 40–120)
ALT FLD-CCNC: 21 U/L — SIGNIFICANT CHANGE UP (ref 4–41)
AMMONIA BLD-MCNC: 40 UMOL/L — SIGNIFICANT CHANGE UP (ref 11–55)
ANION GAP SERPL CALC-SCNC: 16 MMO/L — HIGH (ref 7–14)
APPEARANCE UR: CLEAR — SIGNIFICANT CHANGE UP
AST SERPL-CCNC: 28 U/L — SIGNIFICANT CHANGE UP (ref 4–40)
BACTERIA # UR AUTO: HIGH
BASE EXCESS BLDV CALC-SCNC: -3.3 MMOL/L — SIGNIFICANT CHANGE UP
BASOPHILS # BLD AUTO: 0.04 K/UL — SIGNIFICANT CHANGE UP (ref 0–0.2)
BASOPHILS NFR BLD AUTO: 0.3 % — SIGNIFICANT CHANGE UP (ref 0–2)
BILIRUB SERPL-MCNC: 0.8 MG/DL — SIGNIFICANT CHANGE UP (ref 0.2–1.2)
BILIRUB UR-MCNC: NEGATIVE — SIGNIFICANT CHANGE UP
BLD GP AB SCN SERPL QL: NEGATIVE — SIGNIFICANT CHANGE UP
BLOOD UR QL VISUAL: SIGNIFICANT CHANGE UP
BUN SERPL-MCNC: 40 MG/DL — HIGH (ref 7–23)
CALCIUM SERPL-MCNC: 9.4 MG/DL — SIGNIFICANT CHANGE UP (ref 8.4–10.5)
CHLORIDE SERPL-SCNC: 101 MMOL/L — SIGNIFICANT CHANGE UP (ref 98–107)
CO2 SERPL-SCNC: 19 MMOL/L — LOW (ref 22–31)
COLOR SPEC: SIGNIFICANT CHANGE UP
CREAT SERPL-MCNC: 1.38 MG/DL — HIGH (ref 0.5–1.3)
EOSINOPHIL # BLD AUTO: 0.01 K/UL — SIGNIFICANT CHANGE UP (ref 0–0.5)
EOSINOPHIL NFR BLD AUTO: 0.1 % — SIGNIFICANT CHANGE UP (ref 0–6)
GLUCOSE SERPL-MCNC: 147 MG/DL — HIGH (ref 70–99)
GLUCOSE UR-MCNC: NEGATIVE — SIGNIFICANT CHANGE UP
HCO3 BLDV-SCNC: 21 MMOL/L — SIGNIFICANT CHANGE UP (ref 20–27)
HCT VFR BLD CALC: 45.5 % — SIGNIFICANT CHANGE UP (ref 39–50)
HCT VFR BLD CALC: 46.2 % — SIGNIFICANT CHANGE UP (ref 39–50)
HCT VFR BLD CALC: 47.4 % — SIGNIFICANT CHANGE UP (ref 39–50)
HGB BLD-MCNC: 13.7 G/DL — SIGNIFICANT CHANGE UP (ref 13–17)
HGB BLD-MCNC: 14.2 G/DL — SIGNIFICANT CHANGE UP (ref 13–17)
HGB BLD-MCNC: 14.7 G/DL — SIGNIFICANT CHANGE UP (ref 13–17)
HYALINE CASTS # UR AUTO: NEGATIVE — SIGNIFICANT CHANGE UP
IMM GRANULOCYTES NFR BLD AUTO: 1 % — SIGNIFICANT CHANGE UP (ref 0–1.5)
KETONES UR-MCNC: NEGATIVE — SIGNIFICANT CHANGE UP
LACTATE BLDV-MCNC: 2.6 MMOL/L — HIGH (ref 0.5–2)
LEUKOCYTE ESTERASE UR-ACNC: SIGNIFICANT CHANGE UP
LYMPHOCYTES # BLD AUTO: 0.97 K/UL — LOW (ref 1–3.3)
LYMPHOCYTES # BLD AUTO: 6.8 % — LOW (ref 13–44)
MAGNESIUM SERPL-MCNC: 1.8 MG/DL — SIGNIFICANT CHANGE UP (ref 1.6–2.6)
MCHC RBC-ENTMCNC: 24.7 PG — LOW (ref 27–34)
MCHC RBC-ENTMCNC: 24.8 PG — LOW (ref 27–34)
MCHC RBC-ENTMCNC: 25 PG — LOW (ref 27–34)
MCHC RBC-ENTMCNC: 30.1 % — LOW (ref 32–36)
MCHC RBC-ENTMCNC: 30.7 % — LOW (ref 32–36)
MCHC RBC-ENTMCNC: 31 % — LOW (ref 32–36)
MCV RBC AUTO: 80.6 FL — SIGNIFICANT CHANGE UP (ref 80–100)
MCV RBC AUTO: 80.6 FL — SIGNIFICANT CHANGE UP (ref 80–100)
MCV RBC AUTO: 82 FL — SIGNIFICANT CHANGE UP (ref 80–100)
MONOCYTES # BLD AUTO: 1.01 K/UL — HIGH (ref 0–0.9)
MONOCYTES NFR BLD AUTO: 7.1 % — SIGNIFICANT CHANGE UP (ref 2–14)
NEUTROPHILS # BLD AUTO: 12 K/UL — HIGH (ref 1.8–7.4)
NEUTROPHILS NFR BLD AUTO: 84.7 % — HIGH (ref 43–77)
NITRITE UR-MCNC: POSITIVE — HIGH
NRBC # FLD: 0 K/UL — SIGNIFICANT CHANGE UP (ref 0–0)
OB PNL STL: POSITIVE — SIGNIFICANT CHANGE UP
PCO2 BLDV: 40 MMHG — LOW (ref 41–51)
PH BLDV: 7.35 PH — SIGNIFICANT CHANGE UP (ref 7.32–7.43)
PH UR: 6 — SIGNIFICANT CHANGE UP (ref 5–8)
PHOSPHATE SERPL-MCNC: 1.9 MG/DL — LOW (ref 2.5–4.5)
PLATELET # BLD AUTO: 245 K/UL — SIGNIFICANT CHANGE UP (ref 150–400)
PLATELET # BLD AUTO: 272 K/UL — SIGNIFICANT CHANGE UP (ref 150–400)
PLATELET # BLD AUTO: 276 K/UL — SIGNIFICANT CHANGE UP (ref 150–400)
PMV BLD: 10.1 FL — SIGNIFICANT CHANGE UP (ref 7–13)
PMV BLD: 10.4 FL — SIGNIFICANT CHANGE UP (ref 7–13)
PMV BLD: 10.5 FL — SIGNIFICANT CHANGE UP (ref 7–13)
PO2 BLDV: 44 MMHG — HIGH (ref 35–40)
POTASSIUM SERPL-MCNC: 4.3 MMOL/L — SIGNIFICANT CHANGE UP (ref 3.5–5.3)
POTASSIUM SERPL-SCNC: 4.3 MMOL/L — SIGNIFICANT CHANGE UP (ref 3.5–5.3)
PROT SERPL-MCNC: 7.3 G/DL — SIGNIFICANT CHANGE UP (ref 6–8.3)
PROT UR-MCNC: 30 — SIGNIFICANT CHANGE UP
RBC # BLD: 5.55 M/UL — SIGNIFICANT CHANGE UP (ref 4.2–5.8)
RBC # BLD: 5.73 M/UL — SIGNIFICANT CHANGE UP (ref 4.2–5.8)
RBC # BLD: 5.88 M/UL — HIGH (ref 4.2–5.8)
RBC # FLD: 17.2 % — HIGH (ref 10.3–14.5)
RBC # FLD: 17.2 % — HIGH (ref 10.3–14.5)
RBC # FLD: 17.9 % — HIGH (ref 10.3–14.5)
RBC CASTS # UR COMP ASSIST: HIGH (ref 0–?)
RH IG SCN BLD-IMP: POSITIVE — SIGNIFICANT CHANGE UP
SAO2 % BLDV: 74.5 % — SIGNIFICANT CHANGE UP (ref 60–85)
SODIUM SERPL-SCNC: 136 MMOL/L — SIGNIFICANT CHANGE UP (ref 135–145)
SP GR SPEC: 1.04 — SIGNIFICANT CHANGE UP (ref 1–1.04)
SPECIMEN SOURCE: SIGNIFICANT CHANGE UP
SPECIMEN SOURCE: SIGNIFICANT CHANGE UP
SQUAMOUS # UR AUTO: SIGNIFICANT CHANGE UP
UROBILINOGEN FLD QL: NORMAL — SIGNIFICANT CHANGE UP
WBC # BLD: 11.96 K/UL — HIGH (ref 3.8–10.5)
WBC # BLD: 14.17 K/UL — HIGH (ref 3.8–10.5)
WBC # BLD: 15.62 K/UL — HIGH (ref 3.8–10.5)
WBC # FLD AUTO: 11.96 K/UL — HIGH (ref 3.8–10.5)
WBC # FLD AUTO: 14.17 K/UL — HIGH (ref 3.8–10.5)
WBC # FLD AUTO: 15.62 K/UL — HIGH (ref 3.8–10.5)
WBC UR QL: >50 — HIGH (ref 0–?)

## 2019-10-29 PROCEDURE — 12345: CPT | Mod: NC,GC

## 2019-10-29 PROCEDURE — 99222 1ST HOSP IP/OBS MODERATE 55: CPT | Mod: GC

## 2019-10-29 PROCEDURE — 99223 1ST HOSP IP/OBS HIGH 75: CPT | Mod: GC

## 2019-10-29 RX ORDER — OCTREOTIDE ACETATE 200 UG/ML
50 INJECTION, SOLUTION INTRAVENOUS; SUBCUTANEOUS ONCE
Refills: 0 | Status: COMPLETED | OUTPATIENT
Start: 2019-10-29 | End: 2019-10-29

## 2019-10-29 RX ORDER — OCTREOTIDE ACETATE 200 UG/ML
50 INJECTION, SOLUTION INTRAVENOUS; SUBCUTANEOUS
Qty: 500 | Refills: 0 | Status: DISCONTINUED | OUTPATIENT
Start: 2019-10-29 | End: 2019-11-01

## 2019-10-29 RX ORDER — PANTOPRAZOLE SODIUM 20 MG/1
40 TABLET, DELAYED RELEASE ORAL
Refills: 0 | Status: DISCONTINUED | OUTPATIENT
Start: 2019-10-29 | End: 2019-11-01

## 2019-10-29 RX ORDER — FUROSEMIDE 40 MG
1 TABLET ORAL
Qty: 0 | Refills: 0 | DISCHARGE

## 2019-10-29 RX ORDER — ACETAMINOPHEN 500 MG
650 TABLET ORAL EVERY 6 HOURS
Refills: 0 | Status: DISCONTINUED | OUTPATIENT
Start: 2019-10-29 | End: 2019-11-08

## 2019-10-29 RX ORDER — LACTULOSE 10 G/15ML
10 SOLUTION ORAL THREE TIMES A DAY
Refills: 0 | Status: DISCONTINUED | OUTPATIENT
Start: 2019-10-29 | End: 2019-10-29

## 2019-10-29 RX ORDER — PROPRANOLOL HCL 160 MG
10 CAPSULE, EXTENDED RELEASE 24HR ORAL
Refills: 0 | Status: DISCONTINUED | OUTPATIENT
Start: 2019-10-29 | End: 2019-10-29

## 2019-10-29 RX ORDER — LACTULOSE 10 G/15ML
20 SOLUTION ORAL THREE TIMES A DAY
Refills: 0 | Status: DISCONTINUED | OUTPATIENT
Start: 2019-10-29 | End: 2019-10-30

## 2019-10-29 RX ORDER — PIPERACILLIN AND TAZOBACTAM 4; .5 G/20ML; G/20ML
3.38 INJECTION, POWDER, LYOPHILIZED, FOR SOLUTION INTRAVENOUS ONCE
Refills: 0 | Status: COMPLETED | OUTPATIENT
Start: 2019-10-29 | End: 2019-10-29

## 2019-10-29 RX ORDER — SODIUM CHLORIDE 9 MG/ML
1000 INJECTION, SOLUTION INTRAVENOUS
Refills: 0 | Status: DISCONTINUED | OUTPATIENT
Start: 2019-10-29 | End: 2019-10-29

## 2019-10-29 RX ORDER — PIPERACILLIN AND TAZOBACTAM 4; .5 G/20ML; G/20ML
3.38 INJECTION, POWDER, LYOPHILIZED, FOR SOLUTION INTRAVENOUS EVERY 8 HOURS
Refills: 0 | Status: COMPLETED | OUTPATIENT
Start: 2019-10-29 | End: 2019-11-01

## 2019-10-29 RX ORDER — PANTOPRAZOLE SODIUM 20 MG/1
80 TABLET, DELAYED RELEASE ORAL ONCE
Refills: 0 | Status: COMPLETED | OUTPATIENT
Start: 2019-10-29 | End: 2019-10-29

## 2019-10-29 RX ORDER — LANOLIN ALCOHOL/MO/W.PET/CERES
3 CREAM (GRAM) TOPICAL ONCE
Refills: 0 | Status: DISCONTINUED | OUTPATIENT
Start: 2019-10-29 | End: 2019-10-29

## 2019-10-29 RX ORDER — PROPRANOLOL HCL 160 MG
10 CAPSULE, EXTENDED RELEASE 24HR ORAL
Refills: 0 | Status: DISCONTINUED | OUTPATIENT
Start: 2019-10-29 | End: 2019-11-08

## 2019-10-29 RX ADMIN — PIPERACILLIN AND TAZOBACTAM 200 GRAM(S): 4; .5 INJECTION, POWDER, LYOPHILIZED, FOR SOLUTION INTRAVENOUS at 01:29

## 2019-10-29 RX ADMIN — LACTULOSE 20 GRAM(S): 10 SOLUTION ORAL at 13:37

## 2019-10-29 RX ADMIN — OCTREOTIDE ACETATE 10 MICROGRAM(S)/HR: 200 INJECTION, SOLUTION INTRAVENOUS; SUBCUTANEOUS at 19:44

## 2019-10-29 RX ADMIN — LACTULOSE 20 GRAM(S): 10 SOLUTION ORAL at 21:34

## 2019-10-29 RX ADMIN — LACTULOSE 20 GRAM(S): 10 SOLUTION ORAL at 05:13

## 2019-10-29 RX ADMIN — Medication 10 MILLIGRAM(S): at 05:13

## 2019-10-29 RX ADMIN — OCTREOTIDE ACETATE 50 MICROGRAM(S): 200 INJECTION, SOLUTION INTRAVENOUS; SUBCUTANEOUS at 19:31

## 2019-10-29 RX ADMIN — Medication 10 MILLIGRAM(S): at 17:30

## 2019-10-29 RX ADMIN — PIPERACILLIN AND TAZOBACTAM 25 GRAM(S): 4; .5 INJECTION, POWDER, LYOPHILIZED, FOR SOLUTION INTRAVENOUS at 09:02

## 2019-10-29 RX ADMIN — PANTOPRAZOLE SODIUM 80 MILLIGRAM(S): 20 TABLET, DELAYED RELEASE ORAL at 10:05

## 2019-10-29 RX ADMIN — PIPERACILLIN AND TAZOBACTAM 25 GRAM(S): 4; .5 INJECTION, POWDER, LYOPHILIZED, FOR SOLUTION INTRAVENOUS at 17:30

## 2019-10-29 RX ADMIN — PANTOPRAZOLE SODIUM 40 MILLIGRAM(S): 20 TABLET, DELAYED RELEASE ORAL at 17:30

## 2019-10-29 RX ADMIN — SODIUM CHLORIDE 100 MILLILITER(S): 9 INJECTION, SOLUTION INTRAVENOUS at 01:01

## 2019-10-29 RX ADMIN — HEPARIN SODIUM 5000 UNIT(S): 5000 INJECTION INTRAVENOUS; SUBCUTANEOUS at 05:13

## 2019-10-29 NOTE — H&P ADULT - NSICDXFAMILYHX_GEN_ALL_CORE_FT
FAMILY HISTORY:  Sibling  Still living? Unknown  Family history of ovarian cancer, Age at diagnosis: Age Unknown

## 2019-10-29 NOTE — PROGRESS NOTE ADULT - PROBLEM SELECTOR PLAN 5
Chronic, likely in setting of choledocolithiasis Chronic, likely in setting of choledocholithiasis Elevated lactate on presentation, concern for infection vs mesenteric ischemia    - c/w IVF as above   - trend lactate q6h

## 2019-10-29 NOTE — H&P ADULT - PROBLEM SELECTOR PLAN 4
Chronic, likely in setting of choledocolithiasis Pt with elevated lactate on presentation.  Concern for infection vs mesenteric ischemia.    C/w IVF as above   Trend lactate q6h

## 2019-10-29 NOTE — H&P ADULT - HISTORY OF PRESENT ILLNESS
79 yr old man from home, PMH of [facility] (1996, hospitalized for 4 months), decompensated alcoholic cirrhosis (varices: 5/27/18; no ascites per CT; encephalopathy: on lactulose; ?HCC: concerning lesion seen on MR; off transplant list 2/2 ?age), porcelain gallbladder, SMA stenosis, s/p b/l inguinal hernia repair, presenting with diffuse abdominal pain x 2 days.  History obtained from pt and wife (Shannon [phone]).  Pain started 2 days ago rated, waxes and wanes from 2/10 to 8/10, sharp in nature, non-radiating, worse with PO intake and palpation. Pt points to all abdominal quadrants when describing pain stating that the pain involves his lower ribs b/l as well. Per wife pt has not eaten since the abdominal pain began and has not had a BM or passed flatus in 2 days despite lactulose.  Pt states that prior to 2 days ago he was having 1-2 BM per day with lactulose. Pt denies nausea or vomiting. No fevers/chills, chest pain, SOB, increased urinary frequency/pain/burning with urination.  Last travel 3 months ago to [place].      EDVS: Tmax 100, HR , -170/52-90, SpO2 100% RA  ED gave 2L LR.   Labs notable for WBC 13 with neutrophil predominance, Lipase 36 (wnl), Alk Phos 147 (chronic), Na 132, HCO3 18, Cr 1.55 (at baseline), Lactate 4.4 > 3.5    CXR: clear lungs  CT abd with oral and IV contrast: Cholelithiasis and porcelain gallbladder are again noted. Choledocholithiasis, with mild dilatation of the common bile duct. Cirrhotic configuration of the liver. Minimal infiltration of the fat adjacent to the pancreatic head and proximal duodenum, raising consideration of mild pancreatitis and/or duodenitis. 79 yr old man from home, PMH of [facility] (1996, hospitalized for 4 months), decompensated alcoholic cirrhosis (varices: 5/27/18; no ascites per CT; encephalopathy: on lactulose; ?HCC: concerning lesion seen on MR; off transplant list 2/2 ?age), porcelain gallbladder, SMA stenosis, s/p b/l inguinal hernia repair, presenting with diffuse abdominal pain x 2 days.  History obtained from pt and wife (Shannon [phone]).  Pain started 2 days ago rated, waxes and wanes from 2/10 to 8/10, sharp in nature, non-radiating, worse with PO intake and palpation. Pt points to all abdominal quadrants when describing pain stating that the pain involves his lower ribs b/l as well. Per wife pt has not eaten since the abdominal pain began and has not had a BM or passed flatus in 2 days despite lactulose.  Pt states that prior to 2 days ago he was having 1-2 BM per day with lactulose. Pt denies nausea or vomiting. No fevers/chills, chest pain, SOB, increased urinary frequency/pain/burning with urination.  Last travel 3 months ago to [place]. Per wife mental status is at baseline.       EDVS: Tmax 100, HR , -170/52-90, SpO2 100% RA  ED gave 2L LR.   Labs notable for WBC 13 with neutrophil predominance, Lipase 36 (wnl), Alk Phos 147 (chronic), Na 132, HCO3 18, Cr 1.55 (at baseline), Lactate 4.4 > 3.5    CXR: clear lungs  CT abd with oral and IV contrast: Cholelithiasis and porcelain gallbladder are again noted. Choledocholithiasis, with mild dilatation of the common bile duct. Cirrhotic configuration of the liver. Minimal infiltration of the fat adjacent to the pancreatic head and proximal duodenum, raising consideration of mild pancreatitis and/or duodenitis.

## 2019-10-29 NOTE — CHART NOTE - NSCHARTNOTEFT_GEN_A_CORE
Pt reported maroon-colored stool this AM.  Rectal exam performed by me at 11:25, chaperoned by Zeinab Dubois.  On exam, no external hemorrhoid or fissure appreciated; no overt bleeding; sphincter tone normal; no internal hemorrhoid appreciated on limited digital rectal exam; no jeronimo blood, only brown colored stool;  FOBT sent.    Matt Rivas M.D.   PGY1  Internal Medicine  House Staff, Team 3  Pager: [phone] / [phone]

## 2019-10-29 NOTE — H&P ADULT - NSHPREVIEWOFSYSTEMS_GEN_ALL_CORE
REVIEW OF SYSTEMS:    CONSTITUTIONAL: No weakness, fevers or chills  EYES/ENT: No visual changes;  no throat pain   NECK: No pain or stiffness  RESPIRATORY: No cough, wheezing, hemoptysis; No shortness of breath  CARDIOVASCULAR: No chest pain or palpitations  GASTROINTESTINAL: +Diffuse abdominal pain per HPI. No nausea, vomiting, or hematemesis; No diarrhea or constipation. No melena or hematochezia.  GENITOURINARY: No dysuria, change in frequency or hematuria  NEUROLOGICAL: No weakness, occasional numbness of feet since GBS in 1996  SKIN: No itching, burning, rashes, or lesions   All other review of systems is negative unless indicated above.

## 2019-10-29 NOTE — H&P ADULT - NSHPSOCIALHISTORY_GEN_ALL_CORE
Lives with wife  Retired    Tobacco: never  EtoH: last drink 1 yr ago, used to "drink a lot"   Drug use: denies recreational use Lives with wife (retired pathologist)   Retired    Tobacco: never  EtoH: last drink 1 yr ago, used to "drink a lot"   Drug use: denies recreational use

## 2019-10-29 NOTE — H&P ADULT - PROBLEM SELECTOR PLAN 6
Decompensated ETOH Cirrhosis, previously on transplant list but no longer  MELD-Na 20. 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 No ascities on CT. 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  Liver lesion on MR concerning for HCC, repeat MR inconclusive.  - Trend MELD labs  - Propranolol 20 mg bid  - C/w lactulose 10g QHS Cr at baseline  Trend BMP daily

## 2019-10-29 NOTE — PROGRESS NOTE ADULT - SUBJECTIVE AND OBJECTIVE BOX
Matt Rivas MD, PhD  PGY1  [phone] / [phone]    Interval Hx / Subjective: no acute issue o/n; still c/o upper abdominal pain; no BM yet (last 3 d/a); has difficulty remembering things    ROS:  CON: denied fever, chills, lightheaded, dizziness  CV: denied chest pain, racing heart, skipped beats  RESP: denied SOB, cough, wheezing  GI: constipation; denied nausea, vomiting; diarrhea, dark or bloody stool  : denied flank pain; pain or burning with urination; bloody urine  MSK: denied joint pain or muscle ache  NEURO: denied headache, slurred speech; tingling, numbness  SKIN: denied itching, rash, other skin changes    Medications Standing  MEDICATIONS  (STANDING):  heparin  Injectable 5000 Unit(s) SubCutaneous every 12 hours  influenza   Vaccine 0.5 milliLiter(s) IntraMuscular once  lactulose Syrup 20 Gram(s) Oral three times a day  piperacillin/tazobactam IVPB.. 3.375 Gram(s) IV Intermittent every 8 hours  propranolol 10 milliGRAM(s) Oral two times a day    Medications PRN  MEDICATIONS  (PRN):    Objective:  T(C): 36.3 (10-29-19 @ 05:02), Max: 37.8 (10-28-19 @ 11:47)  T(F): 97.4 (10-29-19 @ 05:02), Max: 100 (10-28-19 @ 11:47)  HR: 112 (10-29-19 @ 05:02) (71 - 130)  BP: 117/88 (10-29-19 @ 05:02) (111/52 - 170/90)  RR: 18 (10-29-19 @ 05:02) (15 - 20)  SpO2: 100% (10-29-19 @ 05:02) (98% - 100%)    Physical Exam:  GEN: resting in bed, NAD  HEENT: PERRL; mucous moist; negative scleral icterus  CV: RRR, S1, S2; no M/R/G; good capillary refill  PULM: LCTAB; not using accessory muscles  ABD: normal bowel sounds; non-distended, soft; non-tender; no rebound, no guarding; no caput medusa  BACK: R CVAT; no L CVA tenderness;  Extremities: no clubbing, cyanosis; no edema; DP and radial pulses palpable  NEURO: AAOx3; no FND  SKIN: negative spider angioma on chest                          14.7   14.17 )-----------( 276      ( 29 Oct 2019 06:50 )             47.4     10-29    136  |  101  |  40<H>  ----------------------------<  147<H>  4.3   |  19<L>  |  1.38<H>    Ca    9.4      29 Oct 2019 06:50  Phos  1.9     10-29  Mg     1.8     10-29    TPro  7.3  /  Alb  3.4  /  TBili  0.8  /  DBili  x   /  AST  28  /  ALT  21  /  AlkPhos  119  10-29    PT/INR - ( 28 Oct 2019 15:01 )   PT: 18.5 SEC;   INR: 1.64     PTT - ( 28 Oct 2019 15:01 )  PTT:33.0 SEC    Lipase, Serum (10.28.19 @ 11:42)    Lipase, Serum: 36.2 U/L Matt Rivas MD, PhD  PGY1  [phone] / [phone]    Interval Hx / Subjective: no acute issue o/n; still c/o upper abdominal pain; no BM yet (last 3 d/a); has difficulty remembering things    ROS:  CON: denied fever, chills, lightheaded, dizziness  CV: denied chest pain, racing heart, skipped beats  RESP: denied SOB, cough, wheezing  GI: +constipation; denied nausea, vomiting; diarrhea, dark or bloody stool  : denied flank pain; pain or burning with urination; bloody urine  MSK: denied joint pain or muscle ache  NEURO: denied headache, slurred speech; tingling, numbness  SKIN: denied itching, rash, other skin changes    Medications Standing  MEDICATIONS  (STANDING):  heparin  Injectable 5000 Unit(s) SubCutaneous every 12 hours  influenza   Vaccine 0.5 milliLiter(s) IntraMuscular once  lactulose Syrup 20 Gram(s) Oral three times a day  piperacillin/tazobactam IVPB.. 3.375 Gram(s) IV Intermittent every 8 hours  propranolol 10 milliGRAM(s) Oral two times a day    Medications PRN  MEDICATIONS  (PRN):    Objective:  T(C): 36.3 (10-29-19 @ 05:02), Max: 37.8 (10-28-19 @ 11:47)  T(F): 97.4 (10-29-19 @ 05:02), Max: 100 (10-28-19 @ 11:47)  HR: 112 (10-29-19 @ 05:02) (71 - 130)  BP: 117/88 (10-29-19 @ 05:02) (111/52 - 170/90)  RR: 18 (10-29-19 @ 05:02) (15 - 20)  SpO2: 100% (10-29-19 @ 05:02) (98% - 100%)    Physical Exam:  GEN: resting in bed, NAD  HEENT: PERRL; mucous moist; negative scleral icterus  CV: RRR, S1, S2; no M/R/G; good capillary refill  PULM: LCTAB; not using accessory muscles  ABD: normal bowel sounds; non-distended, soft; non-tender; no rebound, no guarding; no caput medusa  BACK: R CVAT; no L CVA tenderness;  Extremities: no clubbing, cyanosis; no edema; DP and radial pulses palpable; no asterixis  NEURO: AAOx3; no FND  SKIN: negative spider angioma on chest                          14.7   14.17 )-----------( 276      ( 29 Oct 2019 06:50 )             47.4     10-29    136  |  101  |  40<H>  ----------------------------<  147<H>  4.3   |  19<L>  |  1.38<H>    Ca    9.4      29 Oct 2019 06:50  Phos  1.9     10-29  Mg     1.8     10-29    TPro  7.3  /  Alb  3.4  /  TBili  0.8  /  DBili  x   /  AST  28  /  ALT  21  /  AlkPhos  119  10-29    PT/INR - ( 28 Oct 2019 15:01 )   PT: 18.5 SEC;   INR: 1.64     PTT - ( 28 Oct 2019 15:01 )  PTT:33.0 SEC    Lipase, Serum (10.28.19 @ 11:42)    Lipase, Serum: 36.2 U/L    < from: CT Abdomen and Pelvis w/ Oral Cont and w/ IV Cont (10.28.19 @ 14:20) >    EXAM:  CT ABDOMEN AND PELVIS OC IC        PROCEDURE DATE:  Oct 28 2019         INTERPRETATION:  CLINICAL INFORMATION: Right upper quadrant abdominal   pain.    COMPARISON: Prior abdominal CT dated 5/22/2019.    PROCEDURE:   CT of the Abdomen and Pelvis was performed with intravenous contrast.   Intravenous contrast: 90 ml Omnipaque 350. 10 ml discarded.  Oral contrast: None.  Sagittal and coronal reformats were performed.    FINDINGS:    LOWER CHEST: Coronary arterial calcification. Calcification of the aortic   valve. Right basilar linear atelectasis.    LIVER: Cirrhotic configuration of the liver again noted.  BILE DUCTS: Mild dilatation of the common bile duct to 0.9 cm. A 0.5 cm   calculus in the distal common bile duct at the level of the pancreatic   head is unchanged since 5/22/2019.  GALLBLADDER: Cholelithiasis and calcified gallbladder wall are again   noted.  SPLEEN: Within normal limits.  PANCREAS: Minimal infiltration of the fat adjacent to the pancreatic head   and proximalduodenum.  ADRENALS: Within normal limits.  KIDNEYS/URETERS: No hydronephrosis. Lobulated contour of the kidneys   bilaterally. Kidneys are mildly atrophic bilaterally.    BLADDER: Within normal limits.  REPRODUCTIVE ORGANS: Prostate gland is within normal limits.    BOWEL: No bowel obstruction. Appendix is within normal limits.  PERITONEUM: No ascites.  VESSELS: Atherosclerotic calcification of the aorta. Portal vein is   patent.  RETROPERITONEUM/LYMPH NODES: No lymphadenopathy.    ABDOMINAL WALL: A 3.6 x 2.4 cm complex mass involving the right rectus   muscle is unchanged. Fat-containing umbilical hernia. Again noted, is a   3.5 cm low-attenuation structure in the right lower quadrant, which may   be related to prior inguinal hernia repair and is unchanged since   5/25/2018.  BONES: Degenerative changes in the spine.    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    Choledocholithiasis, with mild dilatation of the common bile duct.     Cirrhotic configuration of the liver.    Minimal infiltration of the fat adjacent to the pancreatic head and   proximal duodenum, raising consideration of mild pancreatitis and/or   duodenitis..    < end of copied text > Matt Rivas MD, PhD  PGY1  [phone] / [phone]    Interval Hx / Subjective: no acute issue o/n; still c/o upper abdominal pain; no BM yet (last 3 d/a); has difficulty remembering things; this morning, endorsed constipation last BM three days ago; denied F/C, lightheadedness, dizziness, CP, racing heart, skipped beat, SOB, cough, nausea, vomiting, diarrhea, dark stool, burning w/ urination, bloody urine, joint pain, slurred speech, headache, tingling, numbness, rash    Medications Standing  MEDICATIONS  (STANDING):  heparin  Injectable 5000 Unit(s) SubCutaneous every 12 hours  influenza   Vaccine 0.5 milliLiter(s) IntraMuscular once  lactulose Syrup 20 Gram(s) Oral three times a day  piperacillin/tazobactam IVPB.. 3.375 Gram(s) IV Intermittent every 8 hours  propranolol 10 milliGRAM(s) Oral two times a day    Medications PRN  MEDICATIONS  (PRN):    Objective:  T(C): 36.3 (10-29-19 @ 05:02), Max: 37.8 (10-28-19 @ 11:47)  T(F): 97.4 (10-29-19 @ 05:02), Max: 100 (10-28-19 @ 11:47)  HR: 112 (10-29-19 @ 05:02) (71 - 130)  BP: 117/88 (10-29-19 @ 05:02) (111/52 - 170/90)  RR: 18 (10-29-19 @ 05:02) (15 - 20)  SpO2: 100% (10-29-19 @ 05:02) (98% - 100%)    Physical Exam:  GEN: resting in bed, NAD  HEENT: PERRL; mucous moist; negative scleral icterus  CV: RRR, S1, S2; no M/R/G; good capillary refill  PULM: LCTAB; not using accessory muscles  ABD: normal bowel sounds; non-distended, soft; non-tender; no rebound, no guarding; no caput medusa  BACK: R CVAT; no L CVA tenderness;  Extremities: no clubbing, cyanosis; no edema; DP and radial pulses palpable; no asterixis  NEURO: AAOx3; no FND  SKIN: negative spider angioma on chest                          14.7   14.17 )-----------( 276      ( 29 Oct 2019 06:50 )             47.4     10-29    136  |  101  |  40<H>  ----------------------------<  147<H>  4.3   |  19<L>  |  1.38<H>    Ca    9.4      29 Oct 2019 06:50  Phos  1.9     10-29  Mg     1.8     10-29    TPro  7.3  /  Alb  3.4  /  TBili  0.8  /  DBili  x   /  AST  28  /  ALT  21  /  AlkPhos  119  10-29    PT/INR - ( 28 Oct 2019 15:01 )   PT: 18.5 SEC;   INR: 1.64     PTT - ( 28 Oct 2019 15:01 )  PTT:33.0 SEC    Lipase, Serum (10.28.19 @ 11:42)    Lipase, Serum: 36.2 U/L    < from: CT Abdomen and Pelvis w/ Oral Cont and w/ IV Cont (10.28.19 @ 14:20) >    EXAM:  CT ABDOMEN AND PELVIS OC IC        PROCEDURE DATE:  Oct 28 2019         INTERPRETATION:  CLINICAL INFORMATION: Right upper quadrant abdominal   pain.    COMPARISON: Prior abdominal CT dated 5/22/2019.    PROCEDURE:   CT of the Abdomen and Pelvis was performed with intravenous contrast.   Intravenous contrast: 90 ml Omnipaque 350. 10 ml discarded.  Oral contrast: None.  Sagittal and coronal reformats were performed.    FINDINGS:    LOWER CHEST: Coronary arterial calcification. Calcification of the aortic   valve. Right basilar linear atelectasis.    LIVER: Cirrhotic configuration of the liver again noted.  BILE DUCTS: Mild dilatation of the common bile duct to 0.9 cm. A 0.5 cm   calculus in the distal common bile duct at the level of the pancreatic   head is unchanged since 5/22/2019.  GALLBLADDER: Cholelithiasis and calcified gallbladder wall are again   noted.  SPLEEN: Within normal limits.  PANCREAS: Minimal infiltration of the fat adjacent to the pancreatic head   and proximalduodenum.  ADRENALS: Within normal limits.  KIDNEYS/URETERS: No hydronephrosis. Lobulated contour of the kidneys   bilaterally. Kidneys are mildly atrophic bilaterally.    BLADDER: Within normal limits.  REPRODUCTIVE ORGANS: Prostate gland is within normal limits.    BOWEL: No bowel obstruction. Appendix is within normal limits.  PERITONEUM: No ascites.  VESSELS: Atherosclerotic calcification of the aorta. Portal vein is   patent.  RETROPERITONEUM/LYMPH NODES: No lymphadenopathy.    ABDOMINAL WALL: A 3.6 x 2.4 cm complex mass involving the right rectus   muscle is unchanged. Fat-containing umbilical hernia. Again noted, is a   3.5 cm low-attenuation structure in the right lower quadrant, which may   be related to prior inguinal hernia repair and is unchanged since   5/25/2018.  BONES: Degenerative changes in the spine.    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    Choledocholithiasis, with mild dilatation of the common bile duct.     Cirrhotic configuration of the liver.    Minimal infiltration of the fat adjacent to the pancreatic head and   proximal duodenum, raising consideration of mild pancreatitis and/or   duodenitis..    < end of copied text >

## 2019-10-29 NOTE — H&P ADULT - NSHPPHYSICALEXAM_GEN_ALL_CORE
ICU Vital Signs Last 24 Hrs  T(C): 36.3 (28 Oct 2019 22:57), Max: 37.8 (28 Oct 2019 11:47)  T(F): 97.4 (28 Oct 2019 22:57), Max: 100 (28 Oct 2019 11:47)  HR: 71 (28 Oct 2019 22:57) (71 - 130)  BP: 145/85 (28 Oct 2019 22:57) (111/52 - 170/90)  RR: 17 (28 Oct 2019 22:57) (15 - 20)  SpO2: 98% (28 Oct 2019 22:57) (98% - 100%)    GENERAL: NAD  HEENT: EOMI, MMM, no oropharyngeal lesions or erythema appreciated  Pulm: normal work of breathing, CTABL  CV: RRR, S1&S2+, no m/r/g appreciated  ABDOMEN: soft, +tender to palpation R > left. Negative varner sign. No guarding or rebound tenderness.   MSK: nl ROM  EXTREMITIES:  no appreciable edema in b/l LE  Neuro: A&Ox3 (oriented to name, place, month and year), no focal deficits  SKIN: warm and dry, no visible rash

## 2019-10-29 NOTE — H&P ADULT - PROBLEM SELECTOR PLAN 2
Leukocytosis and tachycardia to 130 on admission.  Lactate 4.4 now downtrending s/p IVF.    Etiology unclear. Lungs clear on CXR, blood and urine cultures pending.  Pt remains afebrile.    Will monitor for now off Abx.  Will start abx pending cultures or if spikes fever.    IVF at 100cc/hr  Trend lactate q6hr Leukocytosis and tachycardia to 130 on admission.  Lactate 4.4 now downtrending s/p IVF.  Etiology unclear. Lungs clear on CXR, blood and urine cultures pending.  Pt remains afebrile.   Starting empiric abx with zosyn    IVF at 100cc/hr  Trend lactate q6hr

## 2019-10-29 NOTE — H&P ADULT - PROBLEM SELECTOR PLAN 1
Etiology unclear. No peritoneal signs. Pt denies BM or flatus in 2 day but no evidence of bowel obstruction in CTAP.  DDx includes choledocholithiasis vs duodenitis vs mesenteric ischemia.  Lower suspicion for pancreatitis in setting of normal lipase.  - NPO except medications for now  - Mesenteric duplex given hx of SMA stenosis and lactate   - GI consult for endoscopy and possible ERCP and stone extraction Etiology unclear. No peritoneal signs. Pt denies BM or flatus in 2 day but no evidence of bowel obstruction in CTAP.  DDx includes pancreatitis vs choledocholithiasis vs duodenitis vs mesenteric ischemia.    - NPO except medications for now  - Mesenteric duplex given hx of SMA stenosis and lactate   - MRCP to further characterize choledocholithiasis   - GI consult for possible ERCP and stone extraction Etiology unclear. No peritoneal signs. Pt denies BM or flatus in 2 day but no evidence of bowel obstruction in CTAP.  DDx includes pancreatitis vs choledocholithiasis vs duodenitis vs mesenteric ischemia.    - NPO except medications for now  - Mesenteric duplex given hx of SMA stenosis and lactate   - Can discuss with GI if MRCP is necessary to further characterize choledocholithiasis prior to ERCP  - GI consult for possible ERCP and stone extraction

## 2019-10-29 NOTE — CONSULT NOTE ADULT - ASSESSMENT
79 year old man from home, PMH of [facility] (1996, hospitalized for 4 months), decompensated alcoholic cirrhosis (varices: 5/27/18; no ascites per CT; encephalopathy: on lactulose; ?HCC: concerning lesion seen on MR; off transplant list 2/2 ?age), porcelain gallbladder, SMA stenosis, s/p b/l inguinal hernia repair, presenting with diffuse abdominal pain x 2 days.      IMPRESSION  - Abdominal pain with elevated liver enzymes (Tbili 0.8, AST 28, ALT 21, Alk phos 119), CT with CBD dilation to 0.9cm and choledocholithiasis  - Cholelithiasis and porcelain gallbladder    RECOMMENDATION    - trend CMP, INR  - will plan for ERCP tomorrow (pending medical clearance)  - keep NPO after midnight   - surgery consult for cholecystectomy   - supportive care as per primary team    Abbey Cyr, PGY-6  GI fellow  B- [phone]/ [phone]  Please call GI fellow on call after 5pm and on weekends 79 year old man from home, PMH of [facility] (1996, hospitalized for 4 months), decompensated alcoholic cirrhosis (varices: 5/27/18; no ascites per CT; encephalopathy: on lactulose; ?HCC: concerning lesion seen on MR; off transplant list 2/2 age), porcelain gallbladder, SMA stenosis, s/p b/l inguinal hernia repair, presenting with diffuse abdominal pain x 2 days.      IMPRESSION  - Abdominal pain without elevated liver enzymes (Tbili 0.8, AST 28, ALT 21, Alk phos 119), CT with CBD dilation to 0.9cm and choledocholithiasis, also seen on a previous CT scan  - Cholelithiasis and porcelain gallbladder  - Hematochezia, currently HD stable and Hb 14  - Cirrhosis     RECOMMENDATION    - trend CMP, INR  - will plan for ERCP no urgent indication as there are no signs of cholangitis and this finding was present on a previous CT scan as well (once medically cleared, currently with hematochezia)  - keep NPO after midnight   - surgery consult for eventual cholecystectomy  - recommend hepatology consult for evaluation of the hematochezia   - supportive care as per primary team      Abbey Cyr, PGY-6  GI fellow  B- [phone]/ [phone]  Please call GI fellow on call after 5pm and on weekends

## 2019-10-29 NOTE — MEDICAL STUDENT PROGRESS NOTE(EDUCATION) - NS MD HP STUD ASPLAN ASSES FT
Patient is a 80 yo man with a PMH of decompensated of alcoholic cirrhosis with esophageal varices (May 2018) and encephalopathy on lactulose, porcelain gallbladder, SMA stenosis presenting with a two day history of diffuse abdominal pain found to meet SIRS criteria 2/2 to possible duodenitis vs pancreatitis vs. choledocholithiasis vs cholangitis.

## 2019-10-29 NOTE — PROGRESS NOTE ADULT - PROBLEM SELECTOR PLAN 1
Etiology unclear. No peritoneal signs. Pt denies BM or flatus in 2 day but no evidence of bowel obstruction in CTAP.  DDx includes pancreatitis vs choledocholithiasis vs duodenitis vs mesenteric ischemia.    - NPO except medications for now  - Mesenteric duplex given hx of SMA stenosis and lactate   - Can discuss with GI if MRCP is necessary to further characterize choledocholithiasis prior to ERCP  - GI consult for possible ERCP and stone extraction Colicky upper abd pain +/- associated w/ food  - ddx includes choledocholithiasis vs pancreatitis, duodenitis, mesenteric ischemia  - CT A/P shows cholelithiasis, choledocholithiasis w/ CBD dilitation; neg pancreatitis    - consider mesenteric duplex given hx of SMA stenosis and lactate   - can discuss with GI if MRCP is necessary to further characterize choledocholithiasis prior to ERCP  - GI consult for possible ERCP and stone extraction  - NPO except meds until GI recs possible variceal vs PUD vs duodenal ulcer, known duodenitis and Hpylori from Sept 2019 surgical pathology that hasn't been treated  Given protonix 80 IVP x1, continue protonix 40mg IV BID  Hgb slowly downtrending, keep T/S active, transfuse for Hgb <8 or with symptoms or continued bleeding. Check CBC q6 for now

## 2019-10-29 NOTE — MEDICAL STUDENT PROGRESS NOTE(EDUCATION) - NS MD HP STUD ASPLAN PLAN FT
#Abdominal pain: Etiology of abdominal pain is unclear. Duodenitis is possible. Recent history significant for possible positive H. pylori culture on recent endoscopy. Pancreatitis is possible due to history of choledocholithiasis and history of alcohol use distorder. Bowel obstruction is less likely. History is significant for no BM or flatus x 2days; however, CTAP negative for mechanical obstruction. DDx includes duodenitis vs pancreatits vs. #Abdominal pain: Etiology of abdominal pain is unclear. Peptic ulcer 2/2 duodenal inflammation from H. pylori infection is possible. Recent history significant for untreated positive H. pylori culture on recent endoscopy. Pancreatitis is possible due to history of choledocholithiasis and history of alcohol use disorder. Bowel obstruction is less likely. History is significant for no BM or flatus x 2days; however, CTAP negative for mechanical obstruction. DDx includes duodenitis vs pancreatits vs. bowel obstruction.  Plan:  - #Abdominal pain: Etiology of abdominal pain is unclear. Peptic ulcer 2/2 duodenal inflammation from H. pylori infection is possible. Recent history significant for untreated positive H. pylori culture on recent endoscopy. Pancreatitis is possible due to history of choledocholithiasis and history of alcohol use disorder. Bowel obstruction is less likely. History is significant for no BM or flatus x 2days; however, CTAP negative for mechanical obstruction. DDx includes duodenitis vs pancreatits vs. bowel obstruction.  Plan:  - CTAP consistent with CBD dilation consistent with previous imaging and chronic choledocholithiasis  -Clear fluids only until GI recs  -Consider MRCP vs ERCP for further assessment of choledocholithiasis  -Begin tylenol max 2g daily for pain control    #SIRS: Leukocytosis,  on admission with downtrending lactate 4.4>3.5>2.6. Etiology of infection unclear. Chest Xray clear. Blood and urine cultures pending.  -Start embiric antibiotics with Zosyn  - cc/hr  -Lactate downtrending, no longer need to monitor.    #Metabolic acidosis: Elevated lactate on presentation to hospital. Concern for infection vs. mesenteric ischemia.  -Continue with IVF as above.    #CKD: Stage 3 CKD per outpatient chart. Basline Cr 1.2-1.5  -Stable. Monitor BMP qd    #Liver cirrhosis: H/o decompensated alcoholic cirrhosis with esophageal varices (May 2018) and encephalopathy on lactulose.  -Continue with lactulose 20g TID  -Monitor mental status  -Trend MELD-Na labs  -Continue with propranolol 10 mg BID.    #Prophylaxis: DVT prophylaxis  -Heparin subQ

## 2019-10-29 NOTE — MEDICAL STUDENT PROGRESS NOTE(EDUCATION) - SUBJECTIVE AND OBJECTIVE BOX
NOTE INCOMPLETE    Subjective: No acute events overnight. Patient interviewed and examined at bedside. Patient reports improved abdominal pain since admission last night. Reports diffuse abdominal tenderness that intensifies with touching. Reports one bowel movement following lactulose. Denies nausea, vomiting, fever, chills, diarrhea.     ROS:  CON: denied fever, chills  CV: denied chest pain, racing heart  RESP: + cough x 1 day, denied SOB, wheezing  GI: +constipation, denied nausea, vomiting, diarrhea, dark or bloody stool  : denied flank pain; pain or burning with urination; bloody urine  NEURO: + tingling and numbness in b/l UE and LE  SKIN: denied itching, rash    Medications Standing  MEDICATIONS  (STANDING):  heparin  Injectable 5000 Unit(s) SubCutaneous every 12 hours  influenza   Vaccine 0.5 milliLiter(s) IntraMuscular once  lactulose Syrup 20 Gram(s) Oral three times a day  pantoprazole  Injectable 40 milliGRAM(s) IV Push two times a day  piperacillin/tazobactam IVPB.. 3.375 Gram(s) IV Intermittent every 8 hours  propranolol 10 milliGRAM(s) Oral two times a day    Medications PRN  MEDICATIONS  (PRN):  acetaminophen   Tablet .. 650 milliGRAM(s) Oral every 6 hours PRN Mild Pain (1 - 3), Moderate Pain (4 - 6)      Objective:  T(C): 36.3 (10-29-19 @ 05:02), Max: 37.8 (10-28-19 @ 11:47)  T(F): 97.4 (10-29-19 @ 05:02), Max: 100 (10-28-19 @ 11:47)  HR: 112 (10-29-19 @ 05:02) (71 - 130)  BP: 117/88 (10-29-19 @ 05:02) (111/52 - 170/90)  RR: 18 (10-29-19 @ 05:02) (15 - 20)  SpO2: 100% (10-29-19 @ 05:02) (98% - 100%)    Physical Exam:  GEN:  NAD  HEENT: PERRL, no oropharyngeal erythema or exudates; neck supple without LAD  CV: RRR, S1, S2; no M/R/G  PULM: LCTAB; not using accessory muscles  ABD: tender to palpation RUQ>diffusely, normoactive bowel sounds; non-distended, soft; no rebound, no guarding  BACK: no CVA tenderness  Extremities: no clubbing, cyanosis; no edema; DP and radial pulses palpable  NEURO: AAOx3; no focal neurological deficits                          14.7   14.17 )-----------( 276      ( 29 Oct 2019 06:50 )             47.4   10-29    136  |  101  |  40<H>  ----------------------------<  147<H>  4.3   |  19<L>  |  1.38<H>    Ca    9.4      29 Oct 2019 06:50  Phos  1.9     10-29  Mg     1.8     10-29    TPro  7.3  /  Alb  3.4  /  TBili  0.8  /  DBili  x   /  AST  28  /  ALT  21  /  AlkPhos  119  10-29      PT/INR - ( 28 Oct 2019 15:01 )   PT: 18.5 SEC;   INR: 1.64          PTT - ( 28 Oct 2019 15:01 )  PTT:33.0 SEC    Lipase, Serum (10.28.19 @ 11:42)    Lipase, Serum: 36.2 U/L    Urinalysis Basic - ( 29 Oct 2019 03:20 )    Color: LIGHT YELLOW / Appearance: CLEAR / S.036 / pH: 6.0  Gluc: NEGATIVE / Ketone: NEGATIVE  / Bili: NEGATIVE / Urobili: NORMAL   Blood: SMALL / Protein: 30 / Nitrite: POSITIVE   Leuk Esterase: LARGE / RBC: 11-25 / WBC >50   Sq Epi: OCC / Non Sq Epi: x / Bacteria: MODERATE        < from: CT Abdomen and Pelvis w/ Oral Cont and w/ IV Cont (10.28.19 @ 14:20) >  EXAM:  CT ABDOMEN AND PELVIS OC IC        PROCEDURE DATE:  Oct 28 2019         INTERPRETATION:  CLINICAL INFORMATION: Right upper quadrant abdominal   pain.    COMPARISON: Prior abdominal CT dated 2019.    PROCEDURE:   CT of the Abdomen and Pelvis was performed with intravenous contrast.   Intravenous contrast: 90 ml Omnipaque 350. 10 ml discarded.  Oral contrast: None.  Sagittal and coronal reformats were performed.    FINDINGS:    LOWER CHEST: Coronary arterial calcification. Calcification of the aortic   valve. Right basilar linear atelectasis.    LIVER: Cirrhotic configuration of the liver again noted.  BILE DUCTS: Mild dilatation of the common bile duct to 0.9 cm. A 0.5 cm   calculus in the distal common bile duct at the level of the pancreatic   head is unchanged since 2019.  GALLBLADDER: Cholelithiasis and calcified gallbladder wall are again   noted.  SPLEEN: Within normal limits.  PANCREAS: Minimal infiltration of the fat adjacent to the pancreatic head   and proximalduodenum.  ADRENALS: Within normal limits.  KIDNEYS/URETERS: No hydronephrosis. Lobulated contour of the kidneys   bilaterally. Kidneys are mildly atrophic bilaterally.    BLADDER: Within normal limits.  REPRODUCTIVE ORGANS: Prostate gland is within normal limits.    BOWEL: No bowel obstruction. Appendix is within normal limits.  PERITONEUM: No ascites.  VESSELS: Atherosclerotic calcification of the aorta. Portal vein is   patent.  RETROPERITONEUM/LYMPH NODES: No lymphadenopathy.    ABDOMINAL WALL: A 3.6 x 2.4 cm complex mass involving the right rectus   muscle is unchanged. Fat-containing umbilical hernia. Again noted, is a   3.5 cm low-attenuation structure in the right lower quadrant, which may   be related to prior inguinal hernia repair and is unchanged since   2018.  BONES: Degenerative changes in the spine.    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    Choledocholithiasis, with mild dilatation of the common bile duct.     Cirrhotic configuration of the liver.    Minimal infiltration of the fat adjacent to the pancreatic head and   proximal duodenum, raising consideration of mild pancreatitis and/or   duodenitis..                  NANCY HOYT M.D., ATTENDING RADIOLOGIST  This document has been electronically signed. Oct 28 2019  2:52PM        < end of copied text > Subjective: No acute events overnight. Patient interviewed and examined at bedside. Patient reports improved abdominal pain since admission last night. Reports diffuse abdominal tenderness that intensifies with touching. Reports one bowel movement following lactulose. Denies nausea, vomiting, fever, chills, diarrhea.     ROS:  CON: denied fever, chills  CV: denied chest pain, racing heart  RESP: + cough x 1 day, denied SOB, wheezing  GI: +constipation, denied nausea, vomiting, diarrhea, dark or bloody stool  : denied flank pain; pain or burning with urination; bloody urine  NEURO: + tingling and numbness in b/l UE and LE  SKIN: denied itching, rash    Medications Standing  MEDICATIONS  (STANDING):  heparin  Injectable 5000 Unit(s) SubCutaneous every 12 hours  influenza   Vaccine 0.5 milliLiter(s) IntraMuscular once  lactulose Syrup 20 Gram(s) Oral three times a day  pantoprazole  Injectable 40 milliGRAM(s) IV Push two times a day  piperacillin/tazobactam IVPB.. 3.375 Gram(s) IV Intermittent every 8 hours  propranolol 10 milliGRAM(s) Oral two times a day    Medications PRN  MEDICATIONS  (PRN):  acetaminophen   Tablet .. 650 milliGRAM(s) Oral every 6 hours PRN Mild Pain (1 - 3), Moderate Pain (4 - 6)      Objective:  T(C): 36.3 (10-29-19 @ 05:02), Max: 37.8 (10-28-19 @ 11:47)  T(F): 97.4 (10-29-19 @ 05:02), Max: 100 (10-28-19 @ 11:47)  HR: 112 (10-29-19 @ 05:02) (71 - 130)  BP: 117/88 (10-29-19 @ 05:02) (111/52 - 170/90)  RR: 18 (10-29-19 @ 05:02) (15 - 20)  SpO2: 100% (10-29-19 @ 05:02) (98% - 100%)    Physical Exam:  GEN:  NAD  HEENT: PERRL, no oropharyngeal erythema or exudates; neck supple without LAD  CV: RRR, S1, S2; no M/R/G  PULM: LCTAB; not using accessory muscles  ABD: tender to palpation RUQ>diffusely, normoactive bowel sounds; non-distended, soft; no rebound, no guarding  BACK: no CVA tenderness  Extremities: no clubbing, cyanosis; no edema; DP and radial pulses palpable  NEURO: AAOx3; no focal neurological deficits                          14.7   14.17 )-----------( 276      ( 29 Oct 2019 06:50 )             47.4   10-29    136  |  101  |  40<H>  ----------------------------<  147<H>  4.3   |  19<L>  |  1.38<H>    Ca    9.4      29 Oct 2019 06:50  Phos  1.9     10-29  Mg     1.8     10-29    TPro  7.3  /  Alb  3.4  /  TBili  0.8  /  DBili  x   /  AST  28  /  ALT  21  /  AlkPhos  119  10-29      PT/INR - ( 28 Oct 2019 15:01 )   PT: 18.5 SEC;   INR: 1.64          PTT - ( 28 Oct 2019 15:01 )  PTT:33.0 SEC    Lipase, Serum (10.28.19 @ 11:42)    Lipase, Serum: 36.2 U/L    Urinalysis Basic - ( 29 Oct 2019 03:20 )    Color: LIGHT YELLOW / Appearance: CLEAR / S.036 / pH: 6.0  Gluc: NEGATIVE / Ketone: NEGATIVE  / Bili: NEGATIVE / Urobili: NORMAL   Blood: SMALL / Protein: 30 / Nitrite: POSITIVE   Leuk Esterase: LARGE / RBC: 11-25 / WBC >50   Sq Epi: OCC / Non Sq Epi: x / Bacteria: MODERATE        < from: CT Abdomen and Pelvis w/ Oral Cont and w/ IV Cont (10.28.19 @ 14:20) >  EXAM:  CT ABDOMEN AND PELVIS OC IC        PROCEDURE DATE:  Oct 28 2019         INTERPRETATION:  CLINICAL INFORMATION: Right upper quadrant abdominal   pain.    COMPARISON: Prior abdominal CT dated 2019.    PROCEDURE:   CT of the Abdomen and Pelvis was performed with intravenous contrast.   Intravenous contrast: 90 ml Omnipaque 350. 10 ml discarded.  Oral contrast: None.  Sagittal and coronal reformats were performed.    FINDINGS:    LOWER CHEST: Coronary arterial calcification. Calcification of the aortic   valve. Right basilar linear atelectasis.    LIVER: Cirrhotic configuration of the liver again noted.  BILE DUCTS: Mild dilatation of the common bile duct to 0.9 cm. A 0.5 cm   calculus in the distal common bile duct at the level of the pancreatic   head is unchanged since 2019.  GALLBLADDER: Cholelithiasis and calcified gallbladder wall are again   noted.  SPLEEN: Within normal limits.  PANCREAS: Minimal infiltration of the fat adjacent to the pancreatic head   and proximalduodenum.  ADRENALS: Within normal limits.  KIDNEYS/URETERS: No hydronephrosis. Lobulated contour of the kidneys   bilaterally. Kidneys are mildly atrophic bilaterally.    BLADDER: Within normal limits.  REPRODUCTIVE ORGANS: Prostate gland is within normal limits.    BOWEL: No bowel obstruction. Appendix is within normal limits.  PERITONEUM: No ascites.  VESSELS: Atherosclerotic calcification of the aorta. Portal vein is   patent.  RETROPERITONEUM/LYMPH NODES: No lymphadenopathy.    ABDOMINAL WALL: A 3.6 x 2.4 cm complex mass involving the right rectus   muscle is unchanged. Fat-containing umbilical hernia. Again noted, is a   3.5 cm low-attenuation structure in the right lower quadrant, which may   be related to prior inguinal hernia repair and is unchanged since   2018.  BONES: Degenerative changes in the spine.    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    Choledocholithiasis, with mild dilatation of the common bile duct.     Cirrhotic configuration of the liver.    Minimal infiltration of the fat adjacent to the pancreatic head and   proximal duodenum, raising consideration of mild pancreatitis and/or   duodenitis..                  NANCY HOYT M.D., ATTENDING RADIOLOGIST  This document has been electronically signed. Oct 28 2019  2:52PM        < end of copied text >

## 2019-10-29 NOTE — PROGRESS NOTE ADULT - PROBLEM SELECTOR PLAN 6
Cr at baseline  Trend BMP daily Cr baseline ~ 1.2-1.5  - stable, within baseline Chronic, likely in setting of choledocholithiasis

## 2019-10-29 NOTE — CHART NOTE - NSCHARTNOTEFT_GEN_A_CORE
Notified at approx 3:00PM that pt had bowel movement with bright red blood and clots. Pt had melanotic stool and bright blood in bowl. Pt asymptomatic and VSS. STAT CBC drawn, Hb stable. T&S also drawn and sent.     Plan to check repeat CBC at 10PM.  GI already following.    Nieves Louise MD PGY4 [facility]  P: [phone]

## 2019-10-29 NOTE — H&P ADULT - NSHPLABSRESULTS_GEN_ALL_CORE
15.6   13.46 )-----------( 281      ( 28 Oct 2019 11:42 )             47.7       10-28    132<L>  |  96<L>  |  40<H>  ----------------------------<  155<H>  4.8   |  18<L>  |  1.55<H>    Ca    9.8      28 Oct 2019 11:42    TPro  8.2  /  Alb  3.6  /  TBili  0.8  /  DBili  0.2  /  AST  43<H>  /  ALT  27  /  AlkPhos  147<H>  10-28            PT/INR - ( 28 Oct 2019 15:01 )   PT: 18.5 SEC;   INR: 1.64          PTT - ( 28 Oct 2019 15:01 )  PTT:33.0 SEC    Lactate Trend      Radiology:  CT abd with oral and IV contrast:   - Cholelithiasis and porcelain gallbladder are again noted.  - Choledocholithiasis, with mild dilatation of the common bile duct.   - Cirrhotic configuration of the liver.  - Minimal infiltration of the fat adjacent to the pancreatic head and   proximal duodenum, raising consideration of mild pancreatitis and/or   duodenitis.    US Abdomen:   - Redemonstration of cirrhotic morphology of the liver.  - The gallbladder is poorly evaluated on ultrasound, may be related to   known porcelain gallbladder.   CXR: clear lungs     CXR: clear lungs     EKG:

## 2019-10-29 NOTE — PROGRESS NOTE ADULT - PROBLEM SELECTOR PLAN 9
DVT: heparin sub Q  Diet: NPO except meds pending GI eval Decompensated ETOH Cirrhosis, previously on transplant list but no longer  MELD-Na 20. 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 No ascities on CT. 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  Liver lesion on MR concerning for HCC, repeat MR inconclusive.  - Mental status at baseline per wife, currently AOx3 (but states he has been getting forgetful with age)   - Trend MELD labs  - Propranolol 20 mg bid  - C/w lactulose 20g QHS  - F/u Ammonia

## 2019-10-29 NOTE — PROGRESS NOTE ADULT - PROBLEM SELECTOR PLAN 3
- GI consult for endoscopy and possible ERCP and stone extraction - GI consult for EGD and possible ERCP patient had sepsis on admission with Leukocytosis and tachycardia to 130 on admission likely from UTI and possible GI translocation in setting of new GIB.  Lactate 4.4 now downtrending s/p IVF.  +UA. Lungs clear on CXR, blood and urine cultures pending.  Pt remains afebrile.   - starting empiric abx with zosyn    - IVF at 100cc/hr  - lactate downtrending

## 2019-10-29 NOTE — H&P ADULT - ASSESSMENT
79 yr old man w/ hx of GBS (1996), decompensated alcoholic cirrhosis (encephalopathy on lactulose), porcelain gallbladder, SMA stenosis, presenting with diffuse abdominal pain x 2 days, admitted with SIRS. CTAP with choledocholithiasis and possible duodenitis, no evidence of bowel obstruction. 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

## 2019-10-29 NOTE — PROGRESS NOTE ADULT - PROBLEM SELECTOR PLAN 4
Pt with elevated lactate on presentation.  Concern for infection vs mesenteric ischemia.    C/w IVF as above   Trend lactate q6h Elevated lactate on presentation, concern for infection vs mesenteric ischemia    - c/w IVF as above   - trend lactate q6h - GI consult for EGD and possible ERCP

## 2019-10-29 NOTE — CONSULT NOTE ADULT - SUBJECTIVE AND OBJECTIVE BOX
Chief Complaint:  Patient is a 79y old  Male who presents with a chief complaint of Abdominal Pain (29 Oct 2019 08:47)      HPI:  79 year old man from home, PMH of [facility] (, hospitalized for 4 months), decompensated alcoholic cirrhosis (varices: 18; no ascites per CT; encephalopathy: on lactulose; ?HCC: concerning lesion seen on MR; off transplant list  ?age), porcelain gallbladder, SMA stenosis, s/p b/l inguinal hernia repair, presenting with diffuse abdominal pain x 2 days.      History obtained from pt and wife (Shannon [phone]).  Pain started 2 days ago rated, waxes and wanes from 2/10 to 8/10, sharp in nature, non-radiating, worse with PO intake and palpation. Pt points to all abdominal quadrants when describing pain stating that the pain involves his lower ribs b/l as well. Per wife pt has not eaten since the abdominal pain began and has not had a BM or passed flatus in 2 days despite lactulose.  Pt states that prior to 2 days ago he was having 1-2 BM per day with lactulose. Pt denies nausea or vomiting. No fevers/chills, chest pain, SOB, increased urinary frequency/pain/burning with urination.  Last travel 3 months ago to [place]. Per wife mental status is at baseline.       EDVS: Tmax 100, HR , -170/52-90, SpO2 100% RA  ED gave 2L LR.   Labs notable for WBC 13 with neutrophil predominance, Lipase 36 (wnl), Alk Phos 147 (chronic), Na 132, HCO3 18, Cr 1.55 (at baseline), Lactate 4.4 > 3.5    CXR: clear lungs  CT abd with oral and IV contrast: Cholelithiasis and porcelain gallbladder are again noted. Choledocholithiasis, with mild dilatation of the common bile duct. Cirrhotic configuration of the liver. Minimal infiltration of the fat adjacent to the pancreatic head and proximal duodenum, raising consideration of mild pancreatitis and/or duodenitis.    Allergies:  Allergy Status Unknown  No Known Allergies      Home Medications:  Patient Currently Takes Medications as of 29-Oct-2019 00:21 documented in Structured Notes  · 	lactulose 10 g/15 mL oral syrup: Last Dose Taken:  , orally 3 times a day  · 	propranolol 10 mg oral tablet: Last Dose Taken:  , 1 tab(s) orally 2 times a day  · 	Calcium 500+D: Last Dose Taken:    · 	Vitamin D3: Last Dose Taken:      Hospital Medications:  acetaminophen   Tablet .. 650 milliGRAM(s) Oral every 6 hours PRN  heparin  Injectable 5000 Unit(s) SubCutaneous every 12 hours  influenza   Vaccine 0.5 milliLiter(s) IntraMuscular once  lactulose Syrup 20 Gram(s) Oral three times a day  pantoprazole  Injectable 40 milliGRAM(s) IV Push two times a day  piperacillin/tazobactam IVPB.. 3.375 Gram(s) IV Intermittent every 8 hours  propranolol 10 milliGRAM(s) Oral two times a day      PMHX/PSHX:  Liver cirrhosis  Guillain-[place] syndrome  HTN (hypertension)  H/O inguinal hernia repair      Family history:  Family history of ovarian cancer (Sibling)      Social History:   Lives with wife (retired pathologist)   Retired    Tobacco: never  EtoH: last drink 1 yr ago, used to "drink a lot"   Drug use: denies recreational use    ROS:     General:  No wt loss, fevers, chills, night sweats, fatigue,   Eyes:  Good vision, no reported pain  ENT:  No sore throat, pain, runny nose, dysphagia  CV:  No pain, palpitations, hypo/hypertension  Resp:  No dyspnea, cough, tachypnea, wheezing  GI:  See HPI  :  No pain, bleeding, incontinence, nocturia  Muscle:  No pain, weakness  Neuro:  No weakness, tingling, memory problems  Psych:  No fatigue, insomnia, mood problems, depression  Endocrine:  No polyuria, polydipsia, cold/heat intolerance  Heme:  No petechiae, ecchymosis, easy bruisability  Skin:  No rash, edema      PHYSICAL EXAM:     GENERAL:  Appears stated age, well-groomed, well-nourished, no distress  HEENT:  NC/AT,  conjunctivae clear and pink,  no JVD  CHEST:  Full & symmetric excursion, no increased effort, breath sounds clear  HEART:  Regular rhythm, S1, S2, no murmur/rub/S3/S4, no abdominal bruit, no edema  ABDOMEN:  Soft, non-tender, non-distended, normoactive bowel sounds,  no masses ,  EXTREMITIES:  no cyanosis,clubbing or edema  SKIN:  No rash/erythema/ecchymoses/petechiae/wounds/abscess/warm/dry  NEURO:  Alert, oriented    Vital Signs:  Vital Signs Last 24 Hrs  T(C): 36.3 (29 Oct 2019 05:02), Max: 37.8 (28 Oct 2019 11:47)  T(F): 97.4 (29 Oct 2019 05:02), Max: 100 (28 Oct 2019 11:47)  HR: 112 (29 Oct 2019 05:02) (71 - 112)  BP: 117/88 (29 Oct 2019 05:02) (117/88 - 170/90)  BP(mean): --  RR: 18 (29 Oct 2019 05:02) (15 - 18)  SpO2: 100% (29 Oct 2019 05:02) (98% - 100%)  Daily Height in cm: 175.26 (28 Oct 2019 22:57)    Daily     LABS:                        14.7   14.17 )-----------( 276      ( 29 Oct 2019 06:50 )             47.4     10    136  |  101  |  40<H>  ----------------------------<  147<H>  4.3   |  19<L>  |  1.38<H>    Ca    9.4      29 Oct 2019 06:50  Phos  1.9     10  Mg     1.8     10-29    TPro  7.3  /  Alb  3.4  /  TBili  0.8  /  DBili  x   /  AST  28  /  ALT  21  /  AlkPhos  119  10-    LIVER FUNCTIONS - ( 29 Oct 2019 06:50 )  Alb: 3.4 g/dL / Pro: 7.3 g/dL / ALK PHOS: 119 u/L / ALT: 21 u/L / AST: 28 u/L / GGT: x           PT/INR - ( 28 Oct 2019 15:01 )   PT: 18.5 SEC;   INR: 1.64          PTT - ( 28 Oct 2019 15:01 )  PTT:33.0 SEC  Urinalysis Basic - ( 29 Oct 2019 03:20 )    Color: LIGHT YELLOW / Appearance: CLEAR / S.036 / pH: 6.0  Gluc: NEGATIVE / Ketone: NEGATIVE  / Bili: NEGATIVE / Urobili: NORMAL   Blood: SMALL / Protein: 30 / Nitrite: POSITIVE   Leuk Esterase: LARGE / RBC: 11-25 / WBC >50   Sq Epi: OCC / Non Sq Epi: x / Bacteria: MODERATE      Amylase Serum--      Lipase serum--       [id number]  Amylase Serum--      Lipase serum36.2       AmmoniaTest not performed SPECIMEN GROSSLY HEMOLYZED.      Imaging:  < from: CT Abdomen and Pelvis w/ Oral Cont and w/ IV Cont (10.28.19 @ 14:20) >  FINDINGS:    LOWER CHEST: Coronary arterial calcification. Calcification of the aortic   valve. Right basilar linear atelectasis.    LIVER: Cirrhotic configuration of the liver again noted.  BILE DUCTS: Mild dilatation of the common bile duct to 0.9 cm. A 0.5 cm calculus in the distal common bile duct at the level of the pancreatic head is unchanged since 2019.  GALLBLADDER: Cholelithiasis and calcified gallbladder wall are again noted.  SPLEEN: Within normal limits.  PANCREAS: Minimal infiltration of the fat adjacent to the pancreatic head and proximalduodenum.  ADRENALS: Within normal limits.  KIDNEYS/URETERS: No hydronephrosis. Lobulated contour of the kidneys bilaterally. Kidneys are mildly atrophic bilaterally.    BLADDER: Within normal limits.  REPRODUCTIVE ORGANS: Prostate gland is within normal limits.    BOWEL: No bowel obstruction. Appendix is within normal limits.  PERITONEUM: No ascites.  VESSELS: Atherosclerotic calcification of the aorta. Portal vein is patent.  RETROPERITONEUM/LYMPH NODES: No lymphadenopathy.    ABDOMINAL WALL: A 3.6 x 2.4 cm complex mass involving the right rectus muscle is unchanged. Fat-containing umbilical hernia. Again noted, is a 3.5 cm low-attenuation structure in the right lower quadrant, which may be related to prior inguinal hernia repair and is unchanged since 2018.  BONES: Degenerative changes in the spine.    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    Choledocholithiasis, with mild dilatation of the common bile duct.     Cirrhotic configuration of the liver.    Minimal infiltration of the fat adjacent to the pancreatic head and   proximal duodenum, raising consideration of mild pancreatitis and/or   duodenitis..    < end of copied text > Chief Complaint:  Patient is a 79y old  Male who presents with a chief complaint of Abdominal Pain (29 Oct 2019 08:47)      HPI:  79 year old man from home, PMH of [facility] (, hospitalized for 4 months), decompensated alcoholic cirrhosis (varices: 18; no ascites per CT; encephalopathy: on lactulose; ?HCC: concerning lesion seen on MR; off transplant list  ?age), porcelain gallbladder, SMA stenosis, s/p b/l inguinal hernia repair, presenting with diffuse abdominal pain x 2 days.      History obtained from patient and notes.  Pain started 2 days ago rated, waxes and wanes from 2/10 to 8/10, sharp in nature, non-radiating, worse with PO intake and palpation. Pt points to all abdominal quadrants when describing pain stating that the pain involves his lower ribs b/l as well. He denies fever, chills, nausea, vomiting, changes in bowel habits.     Advanced GI team consulted for choledocholithiasis seen on CT.     Patient also developed maroon stools during this hospital admission and is currently being worked up for it by hepatology.     Allergies:  Allergy Status Unknown  No Known Allergies      Home Medications:  Patient Currently Takes Medications as of 29-Oct-2019 00:21 documented in Structured Notes  · 	lactulose 10 g/15 mL oral syrup: Last Dose Taken:  , orally 3 times a day  · 	propranolol 10 mg oral tablet: Last Dose Taken:  , 1 tab(s) orally 2 times a day  · 	Calcium 500+D: Last Dose Taken:    · 	Vitamin D3: Last Dose Taken:      Hospital Medications:  acetaminophen   Tablet .. 650 milliGRAM(s) Oral every 6 hours PRN  heparin  Injectable 5000 Unit(s) SubCutaneous every 12 hours  influenza   Vaccine 0.5 milliLiter(s) IntraMuscular once  lactulose Syrup 20 Gram(s) Oral three times a day  pantoprazole  Injectable 40 milliGRAM(s) IV Push two times a day  piperacillin/tazobactam IVPB.. 3.375 Gram(s) IV Intermittent every 8 hours  propranolol 10 milliGRAM(s) Oral two times a day      PMHX/PSHX:  Liver cirrhosis  Guillain-[place] syndrome  HTN (hypertension)  H/O inguinal hernia repair      Family history:  Family history of ovarian cancer (Sibling)      Social History:   Lives with wife (retired pathologist)   Retired    Tobacco: never  EtoH: last drink 1 yr ago, used to "drink a lot"   Drug use: denies recreational use    ROS:     General:  No wt loss, fevers, chills, night sweats, fatigue,   Eyes:  Good vision, no reported pain  ENT:  No sore throat, pain, runny nose, dysphagia  CV:  No pain, palpitations, hypo/hypertension  Resp:  No dyspnea, cough, tachypnea, wheezing  GI:  See HPI  :  No pain, bleeding, incontinence, nocturia  Muscle:  No pain, weakness  Neuro:  No weakness, tingling, memory problems  Psych:  No fatigue, insomnia, mood problems, depression  Endocrine:  No polyuria, polydipsia, cold/heat intolerance  Heme:  No petechiae, ecchymosis, easy bruisability  Skin:  No rash, edema      PHYSICAL EXAM:     GENERAL:  Appears stated age, well-groomed, well-nourished, no distress  HEENT:  NC/AT,  conjunctivae clear and pink,  no JVD  CHEST:  Full & symmetric excursion, no increased effort, breath sounds clear  HEART:  Regular rhythm, S1, S2, no murmur/rub/S3/S4, no abdominal bruit, no edema  ABDOMEN:  Soft, non-tender, non-distended, normoactive bowel sounds,  no masses ,  EXTREMITIES:  no cyanosis,clubbing or edema  SKIN:  No rash/erythema/ecchymoses/petechiae/wounds/abscess/warm/dry  NEURO:  Alert, oriented    Vital Signs:  Vital Signs Last 24 Hrs  T(C): 36.3 (29 Oct 2019 05:02), Max: 37.8 (28 Oct 2019 11:47)  T(F): 97.4 (29 Oct 2019 05:02), Max: 100 (28 Oct 2019 11:47)  HR: 112 (29 Oct 2019 05:02) (71 - 112)  BP: 117/88 (29 Oct 2019 05:02) (117/88 - 170/90)  BP(mean): --  RR: 18 (29 Oct 2019 05:02) (15 - 18)  SpO2: 100% (29 Oct 2019 05:02) (98% - 100%)  Daily Height in cm: 175.26 (28 Oct 2019 22:57)    Daily     LABS:                        14.7   14.17 )-----------( 276      ( 29 Oct 2019 06:50 )             47.4     10-29    136  |  101  |  40<H>  ----------------------------<  147<H>  4.3   |  19<L>  |  1.38<H>    Ca    9.4      29 Oct 2019 06:50  Phos  1.9     10-29  Mg     1.8     10-29    TPro  7.3  /  Alb  3.4  /  TBili  0.8  /  DBili  x   /  AST  28  /  ALT  21  /  AlkPhos  119  10-29    LIVER FUNCTIONS - ( 29 Oct 2019 06:50 )  Alb: 3.4 g/dL / Pro: 7.3 g/dL / ALK PHOS: 119 u/L / ALT: 21 u/L / AST: 28 u/L / GGT: x           PT/INR - ( 28 Oct 2019 15:01 )   PT: 18.5 SEC;   INR: 1.64          PTT - ( 28 Oct 2019 15:01 )  PTT:33.0 SEC  Urinalysis Basic - ( 29 Oct 2019 03:20 )    Color: LIGHT YELLOW / Appearance: CLEAR / S.036 / pH: 6.0  Gluc: NEGATIVE / Ketone: NEGATIVE  / Bili: NEGATIVE / Urobili: NORMAL   Blood: SMALL / Protein: 30 / Nitrite: POSITIVE   Leuk Esterase: LARGE / RBC: 11-25 / WBC >50   Sq Epi: OCC / Non Sq Epi: x / Bacteria: MODERATE      Amylase Serum--      Lipase serum--       [id number]  Amylase Serum--      Lipase serum36.2       AmmoniaTest not performed SPECIMEN GROSSLY HEMOLYZED.      Imaging:  < from: CT Abdomen and Pelvis w/ Oral Cont and w/ IV Cont (10.28.19 @ 14:20) >  FINDINGS:    LOWER CHEST: Coronary arterial calcification. Calcification of the aortic   valve. Right basilar linear atelectasis.    LIVER: Cirrhotic configuration of the liver again noted.  BILE DUCTS: Mild dilatation of the common bile duct to 0.9 cm. A 0.5 cm calculus in the distal common bile duct at the level of the pancreatic head is unchanged since 2019.  GALLBLADDER: Cholelithiasis and calcified gallbladder wall are again noted.  SPLEEN: Within normal limits.  PANCREAS: Minimal infiltration of the fat adjacent to the pancreatic head and proximalduodenum.  ADRENALS: Within normal limits.  KIDNEYS/URETERS: No hydronephrosis. Lobulated contour of the kidneys bilaterally. Kidneys are mildly atrophic bilaterally.    BLADDER: Within normal limits.  REPRODUCTIVE ORGANS: Prostate gland is within normal limits.    BOWEL: No bowel obstruction. Appendix is within normal limits.  PERITONEUM: No ascites.  VESSELS: Atherosclerotic calcification of the aorta. Portal vein is patent.  RETROPERITONEUM/LYMPH NODES: No lymphadenopathy.    ABDOMINAL WALL: A 3.6 x 2.4 cm complex mass involving the right rectus muscle is unchanged. Fat-containing umbilical hernia. Again noted, is a 3.5 cm low-attenuation structure in the right lower quadrant, which may be related to prior inguinal hernia repair and is unchanged since 2018.  BONES: Degenerative changes in the spine.    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    Choledocholithiasis, with mild dilatation of the common bile duct.     Cirrhotic configuration of the liver.    Minimal infiltration of the fat adjacent to the pancreatic head and   proximal duodenum, raising consideration of mild pancreatitis and/or   duodenitis..    < end of copied text >

## 2019-10-29 NOTE — PROGRESS NOTE ADULT - PROBLEM SELECTOR PLAN 8
Decompensated ETOH Cirrhosis, previously on transplant list but no longer  MELD-Na 20. 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 No ascities on CT. 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  Liver lesion on MR concerning for HCC, repeat MR inconclusive.  - Mental status at baseline per wife, currently AOx3 (but states he has been getting forgetful with age)   - Trend MELD labs  - Propranolol 20 mg bid  - C/w lactulose 20g QHS  - F/u Ammonia C/w propranolol

## 2019-10-29 NOTE — H&P ADULT - ATTENDING COMMENTS
Pt seen and examined. 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 Pt reports diffuse pain however on exam pt with mostly pain in the right upper quadrant with deep palpation. CT A/P reviewed. Pt with choledocholithiasis which appears unchanged from prior imaging however is now presenting with new worsening pain. Concern for mild pancreatitis vs evolving cholangitis in the setting of leukocytosis, RUQ pain and T max 100. As such, will treat empirically with zosyn pending GI eval and mrcp/ercp. Keep NPO for now, pt declining IVF. PT also reports last BM 2 days ago despite lactulose dose, no evidence of obstruction on CT. Mental status at baseline as per wife. Will increase lactulose for goal 2-3 BMs a day. Rest of care as above.

## 2019-10-29 NOTE — PROGRESS NOTE ADULT - PROBLEM SELECTOR PLAN 2
Leukocytosis and tachycardia to 130 on admission.  Lactate 4.4 now downtrending s/p IVF.  Etiology unclear. Lungs clear on CXR, blood and urine cultures pending.  Pt remains afebrile.   Starting empiric abx with zosyn    IVF at 100cc/hr  Trend lactate q6hr Leukocytosis and tachycardia to 130 on admission.  Lactate 4.4 now downtrending s/p IVF.  Etiology unclear. Lungs clear on CXR, blood and urine cultures pending.  Pt remains afebrile.   - starting empiric abx with zosyn    - IVF at 100cc/hr  - lactate downtrending Colicky upper abd pain +/- associated w/ food  - ddx includes choledocholithiasis vs pancreatitis, duodenitis, mesenteric ischemia  - CT A/P shows cholelithiasis, choledocholithiasis w/ CBD dilitation; neg pancreatitis    - consider mesenteric duplex given hx of SMA stenosis and lactate   - can discuss with GI if MRCP is necessary to further characterize choledocholithiasis prior to ERCP  - GI consult for possible ERCP and stone extraction  - NPO except meds until GI recs

## 2019-10-29 NOTE — H&P ADULT - PROBLEM SELECTOR PLAN 8
Decompensated ETOH Cirrhosis, previously on transplant list but no longer  MELD-Na 20. 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 No ascities on CT. 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  Liver lesion on MR concerning for HCC, repeat MR inconclusive.  - Trend MELD labs  - Propranolol 20 mg bid  - C/w lactulose 10g QHS Decompensated ETOH Cirrhosis, previously on transplant list but no longer  MELD-Na 20. 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 No ascities on CT. 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  Liver lesion on MR concerning for HCC, repeat MR inconclusive.  - Mental status at baseline per wife, currently AOx3 (but states he has been getting forgetful with age)   - Trend MELD labs  - Propranolol 20 mg bid  - C/w lactulose 10g QHS  - F/u Ammonia Decompensated ETOH Cirrhosis, previously on transplant list but no longer  MELD-Na 20. 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 No ascities on CT. 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  Liver lesion on MR concerning for HCC, repeat MR inconclusive.  - Mental status at baseline per wife, currently AOx3 (but states he has been getting forgetful with age)   - Trend MELD labs  - Propranolol 20 mg bid  - C/w lactulose 20g QHS  - F/u Ammonia

## 2019-10-30 LAB
ALBUMIN SERPL ELPH-MCNC: 3.3 G/DL — SIGNIFICANT CHANGE UP (ref 3.3–5)
ALP SERPL-CCNC: 105 U/L — SIGNIFICANT CHANGE UP (ref 40–120)
ALT FLD-CCNC: 23 U/L — SIGNIFICANT CHANGE UP (ref 4–41)
ANION GAP SERPL CALC-SCNC: 14 MMO/L — SIGNIFICANT CHANGE UP (ref 7–14)
APTT BLD: 28.3 SEC — SIGNIFICANT CHANGE UP (ref 27.5–36.3)
AST SERPL-CCNC: 32 U/L — SIGNIFICANT CHANGE UP (ref 4–40)
BILIRUB SERPL-MCNC: 0.6 MG/DL — SIGNIFICANT CHANGE UP (ref 0.2–1.2)
BUN SERPL-MCNC: 43 MG/DL — HIGH (ref 7–23)
CALCIUM SERPL-MCNC: 9.1 MG/DL — SIGNIFICANT CHANGE UP (ref 8.4–10.5)
CHLORIDE SERPL-SCNC: 103 MMOL/L — SIGNIFICANT CHANGE UP (ref 98–107)
CO2 SERPL-SCNC: 19 MMOL/L — LOW (ref 22–31)
CREAT SERPL-MCNC: 1.63 MG/DL — HIGH (ref 0.5–1.3)
GLUCOSE SERPL-MCNC: 111 MG/DL — HIGH (ref 70–99)
HCT VFR BLD CALC: 40.5 % — SIGNIFICANT CHANGE UP (ref 39–50)
HCT VFR BLD CALC: 41.1 % — SIGNIFICANT CHANGE UP (ref 39–50)
HCT VFR BLD CALC: 46.5 % — SIGNIFICANT CHANGE UP (ref 39–50)
HGB BLD-MCNC: 12.6 G/DL — LOW (ref 13–17)
HGB BLD-MCNC: 12.9 G/DL — LOW (ref 13–17)
HGB BLD-MCNC: 13.8 G/DL — SIGNIFICANT CHANGE UP (ref 13–17)
INR BLD: 1.44 — HIGH (ref 0.88–1.17)
MAGNESIUM SERPL-MCNC: 1.9 MG/DL — SIGNIFICANT CHANGE UP (ref 1.6–2.6)
MCHC RBC-ENTMCNC: 24.6 PG — LOW (ref 27–34)
MCHC RBC-ENTMCNC: 25.3 PG — LOW (ref 27–34)
MCHC RBC-ENTMCNC: 25.6 PG — LOW (ref 27–34)
MCHC RBC-ENTMCNC: 29.7 % — LOW (ref 32–36)
MCHC RBC-ENTMCNC: 31.1 % — LOW (ref 32–36)
MCHC RBC-ENTMCNC: 31.4 % — LOW (ref 32–36)
MCV RBC AUTO: 81.3 FL — SIGNIFICANT CHANGE UP (ref 80–100)
MCV RBC AUTO: 81.7 FL — SIGNIFICANT CHANGE UP (ref 80–100)
MCV RBC AUTO: 83 FL — SIGNIFICANT CHANGE UP (ref 80–100)
NRBC # FLD: 0 K/UL — SIGNIFICANT CHANGE UP (ref 0–0)
PHOSPHATE SERPL-MCNC: 2.5 MG/DL — SIGNIFICANT CHANGE UP (ref 2.5–4.5)
PLATELET # BLD AUTO: 209 K/UL — SIGNIFICANT CHANGE UP (ref 150–400)
PLATELET # BLD AUTO: 213 K/UL — SIGNIFICANT CHANGE UP (ref 150–400)
PLATELET # BLD AUTO: 224 K/UL — SIGNIFICANT CHANGE UP (ref 150–400)
PMV BLD: 10.3 FL — SIGNIFICANT CHANGE UP (ref 7–13)
PMV BLD: 10.7 FL — SIGNIFICANT CHANGE UP (ref 7–13)
PMV BLD: 9.7 FL — SIGNIFICANT CHANGE UP (ref 7–13)
POTASSIUM SERPL-MCNC: 4.2 MMOL/L — SIGNIFICANT CHANGE UP (ref 3.5–5.3)
POTASSIUM SERPL-SCNC: 4.2 MMOL/L — SIGNIFICANT CHANGE UP (ref 3.5–5.3)
PROT SERPL-MCNC: 7.2 G/DL — SIGNIFICANT CHANGE UP (ref 6–8.3)
PROTHROM AB SERPL-ACNC: 16.6 SEC — HIGH (ref 9.8–13.1)
RBC # BLD: 4.98 M/UL — SIGNIFICANT CHANGE UP (ref 4.2–5.8)
RBC # BLD: 5.03 M/UL — SIGNIFICANT CHANGE UP (ref 4.2–5.8)
RBC # BLD: 5.6 M/UL — SIGNIFICANT CHANGE UP (ref 4.2–5.8)
RBC # FLD: 17 % — HIGH (ref 10.3–14.5)
RBC # FLD: 17.2 % — HIGH (ref 10.3–14.5)
RBC # FLD: 17.3 % — HIGH (ref 10.3–14.5)
SODIUM SERPL-SCNC: 136 MMOL/L — SIGNIFICANT CHANGE UP (ref 135–145)
SPECIMEN SOURCE: SIGNIFICANT CHANGE UP
WBC # BLD: 8.56 K/UL — SIGNIFICANT CHANGE UP (ref 3.8–10.5)
WBC # BLD: 9.23 K/UL — SIGNIFICANT CHANGE UP (ref 3.8–10.5)
WBC # BLD: 9.36 K/UL — SIGNIFICANT CHANGE UP (ref 3.8–10.5)
WBC # FLD AUTO: 8.56 K/UL — SIGNIFICANT CHANGE UP (ref 3.8–10.5)
WBC # FLD AUTO: 9.23 K/UL — SIGNIFICANT CHANGE UP (ref 3.8–10.5)
WBC # FLD AUTO: 9.36 K/UL — SIGNIFICANT CHANGE UP (ref 3.8–10.5)

## 2019-10-30 PROCEDURE — 99232 SBSQ HOSP IP/OBS MODERATE 35: CPT | Mod: GC

## 2019-10-30 PROCEDURE — 99222 1ST HOSP IP/OBS MODERATE 55: CPT | Mod: GC

## 2019-10-30 PROCEDURE — 99233 SBSQ HOSP IP/OBS HIGH 50: CPT | Mod: GC

## 2019-10-30 RX ORDER — SOD SULF/SODIUM/NAHCO3/KCL/PEG
4000 SOLUTION, RECONSTITUTED, ORAL ORAL ONCE
Refills: 0 | Status: COMPLETED | OUTPATIENT
Start: 2019-10-30 | End: 2019-10-30

## 2019-10-30 RX ORDER — LACTULOSE 10 G/15ML
10 SOLUTION ORAL
Refills: 0 | Status: DISCONTINUED | OUTPATIENT
Start: 2019-10-30 | End: 2019-10-31

## 2019-10-30 RX ORDER — SODIUM CHLORIDE 9 MG/ML
1000 INJECTION, SOLUTION INTRAVENOUS
Refills: 0 | Status: DISCONTINUED | OUTPATIENT
Start: 2019-10-30 | End: 2019-11-01

## 2019-10-30 RX ADMIN — Medication 10 MILLIGRAM(S): at 17:25

## 2019-10-30 RX ADMIN — Medication 10 MILLIGRAM(S): at 05:47

## 2019-10-30 RX ADMIN — LACTULOSE 20 GRAM(S): 10 SOLUTION ORAL at 05:47

## 2019-10-30 RX ADMIN — PANTOPRAZOLE SODIUM 40 MILLIGRAM(S): 20 TABLET, DELAYED RELEASE ORAL at 17:26

## 2019-10-30 RX ADMIN — Medication 4000 MILLILITER(S): at 17:26

## 2019-10-30 RX ADMIN — PIPERACILLIN AND TAZOBACTAM 25 GRAM(S): 4; .5 INJECTION, POWDER, LYOPHILIZED, FOR SOLUTION INTRAVENOUS at 17:26

## 2019-10-30 RX ADMIN — SODIUM CHLORIDE 100 MILLILITER(S): 9 INJECTION, SOLUTION INTRAVENOUS at 17:27

## 2019-10-30 RX ADMIN — PIPERACILLIN AND TAZOBACTAM 25 GRAM(S): 4; .5 INJECTION, POWDER, LYOPHILIZED, FOR SOLUTION INTRAVENOUS at 09:12

## 2019-10-30 RX ADMIN — PANTOPRAZOLE SODIUM 40 MILLIGRAM(S): 20 TABLET, DELAYED RELEASE ORAL at 05:47

## 2019-10-30 RX ADMIN — LACTULOSE 20 GRAM(S): 10 SOLUTION ORAL at 14:18

## 2019-10-30 RX ADMIN — Medication 15 MILLIGRAM(S): at 21:18

## 2019-10-30 RX ADMIN — PIPERACILLIN AND TAZOBACTAM 25 GRAM(S): 4; .5 INJECTION, POWDER, LYOPHILIZED, FOR SOLUTION INTRAVENOUS at 01:45

## 2019-10-30 NOTE — MEDICAL STUDENT PROGRESS NOTE(EDUCATION) - NS MD HP STUD ASPLAN PLAN FT
#Abdominal pain: Etiology of abdominal pain is unclear. Pain is resolving since hospital admission. Peptic ulcer 2/2 duodenal inflammation from H. pylori infection is possible. Recent history significant for untreated positive H. pylori culture on recent endoscopy. Current melena and blood in stool suggestive of possible GI bleed from ulcer. Pancreatitis is possible due to history of choledocholithiasis and history of alcohol use disorder. Bowel obstruction is less likely. History is significant for no BM or flatus x 2days; however, CTAP negative for mechanical obstruction. DDx includes duodenitis vs pancreatits vs. bowel obstruction.  -NPO per GI recs. Non-urgent ERCP recommended  -Trend CMP, INR per GI recs.  -Continue tylenol max 2g daily for pain control.    #GI Bleed: Melena and hematochezia visualized by team yesterday. FOBT positive. Patient reporting 6-7 episodes of black/maroon blood in toilet overnight. Hgb decreased to 13.7 from 15.5 on admission. Pt is hemodynamically stable.  -Consult general GI team  -Monitor VS and CB  -Ensure current screen and type    #SIRS: Leukocytosis,  on admission with downtrending lactate 4.4>3.5>2.6. Etiology of infection unclear. Chest Xray clear. Blood and urine cultures pending. Leukocytosis improving since admission. At 11.96 down from 15.62 yesterday.   -Day 2 of Zosyn. Continue with course  - cc/hr  -F/u blood and urine cultures    #Metabolic acidosis: Elevated lactate on presentation to hospital. Concern for infection vs. mesenteric ischemia.  -Continue with IVF as above.    #CKD: Stage 3 CKD per outpatient chart. Basline Cr 1.2-1.5  -Stable. Monitor BMP qd  -Avoid possible nephrotoxic medications.    #Liver cirrhosis: H/o decompensated alcoholic cirrhosis with esophageal varices (May 2018) and encephalopathy on lactulose.  -Continue with lactulose 20g TID  -Monitor mental status  -Trend MELD-Na labs- MELD-Na   -Continue with propranolol 10 mg BID.    #Prophylaxis: DVT prophylaxis  -Heparin subQ

## 2019-10-30 NOTE — PROGRESS NOTE ADULT - PROBLEM SELECTOR PLAN 1
possible variceal vs PUD vs duodenal ulcer, known duodenitis and Hpylori from Sept 2019 surgical pathology that hasn't been treated  Given protonix 80 IVP x1, continue protonix 40mg IV BID  Hgb slowly downtrending, keep T/S active, transfuse for Hgb <8 or with symptoms or continued bleeding. Check CBC q6 for now possible variceal vs PUD vs duodenal ulcer, known duodenitis and Hpylori from Sept 2019 surgical pathology that hasn't been treated  Given protonix 80 IVP x1, continue protonix 40mg IV BID  Hgb slowly downtrending, keep T/S active, transfuse for Hgb <8 or with symptoms or continued bleeding. Check CBC q8 possible variceal vs PUD vs duodenal ulcer, known duodenitis and H. pylori from Sept 2019 surgical pathology that hasn't been treated  Given protonix 80 IVP x1, continue protonix 40mg IV BID  Hgb slowly downtrending, keep T/S active, transfuse for Hgb <8 or with symptoms or continued bleeding possible variceal vs PUD vs duodenal ulcer, known duodenitis and H. pylori from Sept 2019 surgical pathology that hasn't been treated  - given protonix 80 IVP x1, continue protonix 40mg IV BID  - Hgb slowly downtrending  - keep T/S active, transfuse for Hgb <8 or with symptoms or continued bleeding  - Hepatology consulted, recs appreciated  - GI consulted for possible colonoscopy +/- EGD  - NPO pending hepatology/GI rec possible variceal (less likely) vs PUD vs duodenal ulcer, known duodenitis and H. pylori from Sept 2019 surgical pathology that hasn't been treated  - given protonix 80 IVP x1, continue protonix 40mg IV BID  - Hgb slowly downtrending  - keep T/S active, transfuse for Hgb <8 or with symptoms or continued bleeding  - Hepatology consulted, recs appreciated  - GI consulted for possible colonoscopy +/- EGD  - NPO pending hepatology/GI rec

## 2019-10-30 NOTE — PROGRESS NOTE ADULT - ASSESSMENT
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  Admitted with SIRS possibly 2/2 choledocholithiasis vs. ?pancreatitis and acute blood loss anemia likely from PUD vs varices

## 2019-10-30 NOTE — PROGRESS NOTE ADULT - PROBLEM SELECTOR PLAN 5
Elevated lactate on presentation, concern for infection vs mesenteric ischemia    - c/w IVF as above   - trend lactate q6h

## 2019-10-30 NOTE — PROGRESS NOTE ADULT - ASSESSMENT
Impression:  79 year old man from home, PMH of [facility] (1996, hospitalized for 4 months), decompensated alcoholic cirrhosis (varices: 5/27/18; no ascites per CT; encephalopathy: on lactulose; ?HCC: concerning lesion seen on MR; off transplant list 2/2 age), porcelain gallbladder, SMA stenosis, s/p b/l inguinal hernia repair, presenting with diffuse abdominal pain x 2 days.      - Abdominal pain without elevated liver enzymes (Tbili 0.8, AST 28, ALT 21, Alk phos 119), CT with CBD dilation to 0.9cm and choledocholithiasis, also seen on a previous CT scan  - Cholelithiasis and porcelain gallbladder  - Hematochezia, currently HD stable and Hb 14  - Cirrhosis       RECS:  - GI bleed management per hepatology   - trend CBC, CMP, INR  - will plan for ERCP eventually; no urgent indication as there are no signs of cholangitis and this finding was present on a previous CT scan as well (once medically cleared, currently with hematochezia)  - surgery consult for eventual cholecystectomy  - supportive care as per primary team

## 2019-10-30 NOTE — MEDICAL STUDENT PROGRESS NOTE(EDUCATION) - SUBJECTIVE AND OBJECTIVE BOX
SAEED KETAN  79y  Male      Patient is a 79y old  Male who presents with a chief complaint of Abdominal Pain (30 Oct 2019 07:04)      INTERVAL HPI/OVERNIGHT EVENTS: No acute events overnight. Patient interviewed and examined at bedside. Patient reports that his abdominal pain is continuing to feel better. He states that he has not had any more BMs, but he has passed maroon/black blood into the toilet 6-7 times overnight. Patient denies chills, nausea, vomiting, diarrhea, constipation, chest pain, SOB.       REVIEW OF SYSTEMS:  CONSTITUTIONAL: No fever or fatigue  ENMT: No sinus or throat pain  NECK: No pain or stiffness  BREASTS: No pain, masses, or nipple discharge  RESPIRATORY: + cough/ No wheezing, chills or hemoptysis; No shortness of breath  CARDIOVASCULAR: No chest pain, palpitations, dizziness, or leg swelling  GASTROINTESTINAL: + melena, + abdominal RUQ pain. No nausea, vomiting, or hematemesis; No diarrhea or constipation.  GENITOURINARY: No dysuria, frequency, hematuria, or incontinence  NEUROLOGICAL: + numbness and tingling in UE and LE b/l. No headaches.  SKIN: No itching, burning, rashes, or lesions   LYMPH NODES: No enlarged glands    Objective:    Vital Signs Last 24 Hrs  T(C): 36.8 (30 Oct 2019 05:40), Max: 36.8 (30 Oct 2019 05:40)  T(F): 98.3 (30 Oct 2019 05:40), Max: 98.3 (30 Oct 2019 05:40)  HR: 68 (30 Oct 2019 05:40) (61 - 72)  BP: 114/69 (30 Oct 2019 05:40) (100/56 - 142/76)  BP(mean): --  RR: 18 (30 Oct 2019 05:40) (16 - 18)  SpO2: 100% (30 Oct 2019 05:40) (98% - 100%)      PHYSICAL EXAM:  GENERAL: NAD, sleeping in hospital bed  EYES: PERRLA, conjunctiva and sclera clear  ENMT: No tonsillar erythema, exudates, or enlargement; Moist mucous membranes  NECK: Supple, No JVD  NERVOUS SYSTEM:  Alert & Oriented to person, place, and time.  CHEST/LUNG: Clear to auscultation bilaterally; No rales, rhonchi, wheezing, or rubs  HEART: Regular rate and rhythm; No murmurs, rubs, or gallops  ABDOMEN: Soft, tender to palpation in RUQ, Nondistended; Bowel sounds normoactive  EXTREMITIES:  2+ Peripheral Pulses, No clubbing, cyanosis, or edema  LYMPH: No lymphadenopathy noted  SKIN: No rashes or lesions      LABS:                        13.7   11.96 )-----------( 245      ( 29 Oct 2019 22:20 )             45.5     10-29    136  |  101  |  40<H>  ----------------------------<  147<H>  4.3   |  19<L>  |  1.38<H>    Ca    9.4      29 Oct 2019 06:50  Phos  1.9     10-29  Mg     1.8     10-29    TPro  7.3  /  Alb  3.4  /  TBili  0.8  /  DBili  x   /  AST  28  /  ALT  21  /  AlkPhos  119  10-29    PT/INR - ( 30 Oct 2019 06:30 )   PT: 16.6 SEC;   INR: 1.44          PTT - ( 30 Oct 2019 06:30 )  PTT:28.3 SEC  Urinalysis Basic - ( 29 Oct 2019 03:20 )    Color: LIGHT YELLOW / Appearance: CLEAR / S.036 / pH: 6.0  Gluc: NEGATIVE / Ketone: NEGATIVE  / Bili: NEGATIVE / Urobili: NORMAL   Blood: SMALL / Protein: 30 / Nitrite: POSITIVE   Leuk Esterase: LARGE / RBC: 11-25 / WBC >50   Sq Epi: OCC / Non Sq Epi: x / Bacteria: MODERATE      79y  Male    RADIOLOGY & ADDITIONAL TESTS:    HOSPITAL MEDICATIONS:  acetaminophen   Tablet .. 650 milliGRAM(s) Oral every 6 hours PRN  influenza   Vaccine 0.5 milliLiter(s) IntraMuscular once  lactulose Syrup 20 Gram(s) Oral three times a day  octreotide  Infusion 50 MICROgram(s)/Hr IV Continuous <Continuous>  pantoprazole  Injectable 40 milliGRAM(s) IV Push two times a day  piperacillin/tazobactam IVPB.. 3.375 Gram(s) IV Intermittent every 8 hours  propranolol 10 milliGRAM(s) Oral two times a day

## 2019-10-30 NOTE — PROGRESS NOTE ADULT - PROBLEM SELECTOR PLAN 2
Colicky upper abd pain +/- associated w/ food  - ddx includes choledocholithiasis vs pancreatitis, duodenitis, mesenteric ischemia  - CT A/P shows cholelithiasis, choledocholithiasis w/ CBD dilitation; neg pancreatitis    - consider mesenteric duplex given hx of SMA stenosis and lactate   - can discuss with GI if MRCP is necessary to further characterize choledocholithiasis prior to ERCP  - GI consult for possible ERCP and stone extraction  - NPO except meds until GI recs Colicky upper abd pain +/- associated w/ food  - ddx includes choledocholithiasis vs pancreatitis, duodenitis, mesenteric ischemia  - CT A/P shows cholelithiasis, choledocholithiasis w/ CBD dilitation; neg pancreatitis    - consider mesenteric duplex given hx of SMA stenosis and lactate   - per GI, can defer ERCP until GI bleed is addressed

## 2019-10-30 NOTE — MEDICAL STUDENT PROGRESS NOTE(EDUCATION) - NS MD HP STUD ASPLAN ASSES FT
Patient is a 78 yo man with a PMH of decompensated of alcoholic cirrhosis with esophageal varices (May 2018) and encephalopathy on lactulose, porcelain gallbladder, SMA stenosis presenting with a two day history of diffuse abdominal pain found to meet SIRS criteria 2/2 to possible duodenitis vs pancreatitis vs. choledocholithiasis vs cholangitis.

## 2019-10-30 NOTE — CONSULT NOTE ADULT - SUBJECTIVE AND OBJECTIVE BOX
GI HPI:  Patient is a 79y old  Male who presents with a chief complaint of Abdominal Pain (30 Oct 2019 07:04)  79 yr old man from home, PMH of GBS (1996, hospitalized for 4 months), decompensated alcoholic cirrhosis (varices,  no ascites per CT; h/o encephalopathy: on lactulose; ?HCC: concerning lesion seen on MR; off transplant list 2/2 ?age), porcelain gallbladder, SMA stenosis, s/p b/l inguinal hernia repair, presenting with diffuse abdominal pain x 2 days.  History obtained from pt .  Pain started 2 days ago rated, waxes and wanes from 2/10 to 8/10, sharp in nature, non-radiating, worse with PO intake and palpation. Pt points to all abdominal quadrants when describing pain stating that the pain involves his lower ribs b/l as well. Per wife pt has not eaten since the abdominal pain began and has not had a BM or passed flatus in 2 days despite lactulose.  Pt states that prior to 2 days ago he was having 1-2 BM per day with lactulose. Pt denies nausea or vomiting. No fevers/chills, chest pain, SOB, increased urinary frequency/pain/burning with urination.  Last travel 3 months ago to [place]. Per wife mental status is at baseline.       EDVS: Tmax 100, HR , -170/52-90, SpO2 100% RA  ED gave 2L LR.   Labs notable for WBC 13 with neutrophil predominance, Lipase 36 (wnl), Alk Phos 147 (chronic), Na 132, HCO3 18, Cr 1.55 (at baseline), Lactate 4.4 > 3.5    CXR: clear lungs  CT abd with oral and IV contrast: Cholelithiasis and porcelain gallbladder are again noted. Choledocholithiasis, with mild dilatation of the common bile duct. Cirrhotic configuration of the liver. Minimal infiltration of the fat adjacent to the pancreatic head and proximal duodenum, raising consideration of mild pancreatitis and/or duodenitis. (29 Oct 2019 00:20)    During hospital course pt was found to have hematochezia, started 3 days ago, 2-3 episodes per day , associated with BM , moderate amount , improving .       PAST MEDICAL & SURGICAL HISTORY  Liver cirrhosis  Guillain-[place] syndrome  HTN (hypertension)  H/O inguinal hernia repair      FAMILY HISTORY:  FAMILY HISTORY:  Family history of ovarian cancer (Sibling)      SOCIAL HISTORY:  smoker: denies   Alcohol: ex alcoholic , last drink 1 1/2 years ago   Drug: denies     ALLERGIES:  Allergy Status Unknown  No Known Allergies      MEDICATIONS:  MEDICATIONS  (STANDING):  influenza   Vaccine 0.5 milliLiter(s) IntraMuscular once  lactulose Syrup 20 Gram(s) Oral three times a day  octreotide  Infusion 50 MICROgram(s)/Hr (10 mL/Hr) IV Continuous <Continuous>  pantoprazole  Injectable 40 milliGRAM(s) IV Push two times a day  piperacillin/tazobactam IVPB.. 3.375 Gram(s) IV Intermittent every 8 hours  propranolol 10 milliGRAM(s) Oral two times a day    MEDICATIONS  (PRN):  acetaminophen   Tablet .. 650 milliGRAM(s) Oral every 6 hours PRN Mild Pain (1 - 3), Moderate Pain (4 - 6)      HOME MEDICATIONS:  Home Medications:  Calcium 500+D:  (29 Oct 2019 00:21)  lactulose 10 g/15 mL oral syrup: orally 3 times a day (29 Oct 2019 00:21)  propranolol 10 mg oral tablet: 1 tab(s) orally 2 times a day (29 Oct 2019 00:21)  Vitamin D3:  (29 Oct 2019 00:21)      ROS:     REVIEW OF SYSTEMS  General:  No fevers  Eyes:  No reported pain   ENT:  No sore throat   NECK: No stiffness   CV:  No chest pain   Resp:  No shortness of breath  GI:  See HPI  :  No dysuria  Muscle:  No weakness  Neuro:  No tingling  Endocrine:  No polyuria  Heme:  No ecchymosis          VITALS:   T(F): 98.3 (10-30 @ 05:40), Max: 100 (10-28 @ 11:47)  HR: 68 (10-30 @ 05:40) (61 - 130)  BP: 114/69 (10-30 @ 05:40) (100/56 - 170/90)  BP(mean): --  RR: 18 (10-30 @ 05:40) (15 - 20)  SpO2: 100% (10-30 @ 05:40) (98% - 100%)    I&O's Summary      PHYSICAL EXAM:  Gen: comfortable   EYES: No scleral icterus   LUNG: Clear to auscultation bilaterally;   HEART: s1 and s2 heard   ABDOMEN: Soft, +BS, no abd distension, no Abdominal Tenderness, No guarding, No Garduno Sign   Neuro: AAO x3 , no asterix     LABS:                        13.8   9.23  )-----------( 224      ( 30 Oct 2019 06:30 )             46.5     PT/INR - ( 30 Oct 2019 06:30 )  INR: 1.44          PTT - ( 30 Oct 2019 06:30 )  PTT:28.3   LIVER FUNCTIONS - ( 30 Oct 2019 06:30 )  Alb: 3.3 g/dL / Pro: 7.2 g/dL / ALK PHOS: 105 u/L / ALT: 23 u/L / AST: 32 u/L / GGT: x           10-30    136  |  103  |  43<H>  ----------------------------<  111<H>  4.2   |  19<L>  |  1.63<H>    Ca    9.1      30 Oct 2019 06:30  Phos  2.5     10-30  Mg     1.9     10-30        Previous EGD:   < from: Upper Endoscopy (09.11.19 @ 10:04) >      - Small (< 5 mm) esophageal varices.                       - Normal stomach. Biopsied.                       - Granular, erythematous mucosa in the first part of the                        duodenum. Biopsied.                       - The examination was otherwise normal.    < end of copied text >    Path - + H.pylori     Previous colonoscopy:     < from: Colonoscopy (09.11.19 @ 10:04) >         - Multiple non-bleeding colonic angioectasias. Treated                    with argon beam coagulation.                       - Rectal varices.                       - Internal hemorrhoids.                       - No specimens collected.    < end of copied text >        RADIOLOGY:    < from: US Abdomen Limited (10.28.19 @ 18:05) >  Liver: Redemonstration of cirrhotic morphology.    Bile ducts: Normal caliber. Common bile duct measures 6 mm.     Gallbladder: Poorly evaluated . Negative Garduno's sign.    Pancreas: Not visualized.    Right kidney: 8.6 cm. No hydronephrosis.    Ascites: None.    IVC: Visualized portions are within normal limits.    IMPRESSION:     Redemonstration of cirrhotic morphology of the liver.    The gallbladder is poorly evaluated on ultrasound, may be related to   known porcelain gallbladder.         < end of copied text >    < from: CT Abdomen and Pelvis w/ Oral Cont and w/ IV Cont (10.28.19 @ 14:20) >    LIVER: Cirrhotic configuration of the liver again noted.  BILE DUCTS: Mild dilatation of the common bile duct to 0.9 cm. A 0.5 cm   calculus in the distal common bile duct at the level of the pancreatic   head is unchanged since 5/22/2019.  GALLBLADDER: Cholelithiasis and calcified gallbladder wall are again   noted.  SPLEEN: Within normal limits.  PANCREAS: Minimal infiltration of the fat adjacent to the pancreatic head   and proximalduodenum.  ADRENALS: Within normal limits.  KIDNEYS/URETERS: No hydronephrosis. Lobulated contour of the kidneys   bilaterally. Kidneys are mildly atrophic bilaterally.    BLADDER: Within normal limits.  REPRODUCTIVE ORGANS: Prostate gland is within normal limits.    BOWEL: No bowel obstruction. Appendix is within normal limits.  PERITONEUM: No ascites.  VESSELS: Atherosclerotic calcification of the aorta. Portal vein is   patent.  RETROPERITONEUM/LYMPH NODES: No lymphadenopathy.    ABDOMINAL WALL: A 3.6 x 2.4 cm complex mass involving the right rectus   muscle is unchanged. Fat-containing umbilical hernia. Again noted, is a   3.5 cm low-attenuation structure in the right lower quadrant, which may   be related to prior inguinal hernia repair and is unchanged since   5/25/2018.  BONES: Degenerative changes in the spine.    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    Choledocholithiasis, with mild dilatation of the common bile duct.     Cirrhotic configuration of the liver.    Minimal infiltration of the fat adjacent to the pancreatic head and   proximal duodenum, raising consideration of mild pancreatitis and/or   duodenitis..    < end of copied text >    < from: MR Abdomen w/wo IV Cont (09.13.18 @ 12:29) >  LIVER: Cirrhosis. Previously noted arterial enhancing foci within the   liver at prior imaging June 2018 are not identified on the current study.   It should be noted that arterial phase imaging is slightly limited.   Despite this limitation there is no evidence of abnormality at portal   venous or delayed phase imaging. No T1 or T2 signal abnormality in the   regions of interest.          BILE DUCTS: Normal caliber.  GALLBLADDER: Cholelithiasis.  SPLEEN: Within normal limits.  PANCREAS: Within normal limits.  ADRENALS: Within normal limits.  KIDNEYS/URETERS: Symmetric enhancement. No hydronephrosis or renal mass.   Previously noted abnormality in the lower pole of the right kidney has   resolved and may have been related to focal inflammation or infection.    VISUALIZED PORTIONS:    BOWEL: Within normal limits.   PERITONEUM:No ascites.  VESSELS: Hepatic and portal veins are patent.  RETROPERITONEUM: No lymphadenopathy.    ABDOMINAL WALL: A 3.9 cm lesion within the right anterior abdominal wall   without significant change from prior imaging dating to May 2018. Mild   peripheral T1 signal is noted without enhancement. Findings may be   related to a chronic hematoma. Fat-containing umbilical hernia.  BONES: Within normal limits.    IMPRESSION:     Cirrhosis. No evidence of hepatocellular cancer.    Interval resolutionof a previously noted abnormality within the lower   pole of the right kidney.          < end of copied text > GI HPI:  Patient is a 79y old  Male who presents with a chief complaint of Abdominal Pain (30 Oct 2019 07:04)  79 yr old man from home, PMH of GBS (1996, hospitalized for 4 months), decompensated alcoholic cirrhosis (varices,  no ascites per CT; h/o encephalopathy: on lactulose; ?HCC: concerning lesion seen on MR; off transplant list 2/2 ?age), porcelain gallbladder, SMA stenosis, s/p b/l inguinal hernia repair, presenting with diffuse abdominal pain x 2 days.  History obtained from pt.  Pain started 2 days ago rated, waxes and wanes from 2/10 to 8/10, sharp in nature, non-radiating, worse with PO intake and palpation. Pt points to all abdominal quadrants when describing pain stating that the pain involves his lower ribs b/l as well. Per wife pt has not eaten since the abdominal pain began and has not had a BM or passed flatus in 2 days despite lactulose.  Pt states that prior to 2 days ago he was having 1-2 BM per day with lactulose. Pt denies nausea or vomiting. No fevers/chills, chest pain, SOB, increased urinary frequency/pain/burning with urination.  Last travel 3 months ago to [place]. Per wife mental status is at baseline.       EDVS: Tmax 100, HR , -170/52-90, SpO2 100% RA  ED gave 2L LR.   Labs notable for WBC 13 with neutrophil predominance, Lipase 36 (wnl), Alk Phos 147 (chronic), Na 132, HCO3 18, Cr 1.55 (at baseline), Lactate 4.4 > 3.5    CXR: clear lungs  CT abd with oral and IV contrast: Cholelithiasis and porcelain gallbladder are again noted. Choledocholithiasis, with mild dilatation of the common bile duct. Cirrhotic configuration of the liver. Minimal infiltration of the fat adjacent to the pancreatic head and proximal duodenum, raising consideration of mild pancreatitis and/or duodenitis. (29 Oct 2019 00:20)    During hospital course pt was found to have hematochezia, started 3 days ago, 2-3 episodes per day , associated with BM , moderate amount , improving .       PAST MEDICAL & SURGICAL HISTORY  Liver cirrhosis  Guillain-[place] syndrome  HTN (hypertension)  H/O inguinal hernia repair      FAMILY HISTORY:  FAMILY HISTORY:  Family history of ovarian cancer (Sibling)      SOCIAL HISTORY:  smoker: denies   Alcohol: ex alcoholic , last drink 1 1/2 years ago   Drug: denies     ALLERGIES:  Allergy Status Unknown  No Known Allergies      MEDICATIONS:  MEDICATIONS  (STANDING):  influenza   Vaccine 0.5 milliLiter(s) IntraMuscular once  lactulose Syrup 20 Gram(s) Oral three times a day  octreotide  Infusion 50 MICROgram(s)/Hr (10 mL/Hr) IV Continuous <Continuous>  pantoprazole  Injectable 40 milliGRAM(s) IV Push two times a day  piperacillin/tazobactam IVPB.. 3.375 Gram(s) IV Intermittent every 8 hours  propranolol 10 milliGRAM(s) Oral two times a day    MEDICATIONS  (PRN):  acetaminophen   Tablet .. 650 milliGRAM(s) Oral every 6 hours PRN Mild Pain (1 - 3), Moderate Pain (4 - 6)      HOME MEDICATIONS:  Home Medications:  Calcium 500+D:  (29 Oct 2019 00:21)  lactulose 10 g/15 mL oral syrup: orally 3 times a day (29 Oct 2019 00:21)  propranolol 10 mg oral tablet: 1 tab(s) orally 2 times a day (29 Oct 2019 00:21)  Vitamin D3:  (29 Oct 2019 00:21)      ROS:     REVIEW OF SYSTEMS  General:  No fevers  Eyes:  No reported pain   ENT:  No sore throat   NECK: No stiffness   CV:  No chest pain   Resp:  No shortness of breath  GI:  See HPI  :  No dysuria  Muscle:  No weakness  Neuro:  No tingling  Endocrine:  No polyuria  Heme:  No ecchymosis          VITALS:   T(F): 98.3 (10-30 @ 05:40), Max: 100 (10-28 @ 11:47)  HR: 68 (10-30 @ 05:40) (61 - 130)  BP: 114/69 (10-30 @ 05:40) (100/56 - 170/90)  BP(mean): --  RR: 18 (10-30 @ 05:40) (15 - 20)  SpO2: 100% (10-30 @ 05:40) (98% - 100%)    I&O's Summary      PHYSICAL EXAM:  Gen: comfortable   EYES: No scleral icterus   LUNG: Clear to auscultation bilaterally;   HEART: s1 and s2 heard   ABDOMEN: Soft, +BS, no abd distension, no Abdominal Tenderness, No guarding, No Garduno Sign   Neuro: AAO x3 , no asterix     LABS:                        13.8   9.23  )-----------( 224      ( 30 Oct 2019 06:30 )             46.5     PT/INR - ( 30 Oct 2019 06:30 )  INR: 1.44          PTT - ( 30 Oct 2019 06:30 )  PTT:28.3   LIVER FUNCTIONS - ( 30 Oct 2019 06:30 )  Alb: 3.3 g/dL / Pro: 7.2 g/dL / ALK PHOS: 105 u/L / ALT: 23 u/L / AST: 32 u/L / GGT: x           10-30    136  |  103  |  43<H>  ----------------------------<  111<H>  4.2   |  19<L>  |  1.63<H>    Ca    9.1      30 Oct 2019 06:30  Phos  2.5     10-30  Mg     1.9     10-30        Previous EGD:   < from: Upper Endoscopy (09.11.19 @ 10:04) >      - Small (< 5 mm) esophageal varices.                       - Normal stomach. Biopsied.                       - Granular, erythematous mucosa in the first part of the                        duodenum. Biopsied.                       - The examination was otherwise normal.    < end of copied text >    Path - + H.pylori     Previous colonoscopy:     < from: Colonoscopy (09.11.19 @ 10:04) >         - Multiple non-bleeding colonic angioectasias. Treated                    with argon beam coagulation.                       - Rectal varices.                       - Internal hemorrhoids.                       - No specimens collected.    < end of copied text >        RADIOLOGY:    < from: US Abdomen Limited (10.28.19 @ 18:05) >  Liver: Redemonstration of cirrhotic morphology.    Bile ducts: Normal caliber. Common bile duct measures 6 mm.     Gallbladder: Poorly evaluated . Negative Garduno's sign.    Pancreas: Not visualized.    Right kidney: 8.6 cm. No hydronephrosis.    Ascites: None.    IVC: Visualized portions are within normal limits.    IMPRESSION:     Redemonstration of cirrhotic morphology of the liver.    The gallbladder is poorly evaluated on ultrasound, may be related to   known porcelain gallbladder.         < end of copied text >    < from: CT Abdomen and Pelvis w/ Oral Cont and w/ IV Cont (10.28.19 @ 14:20) >    LIVER: Cirrhotic configuration of the liver again noted.  BILE DUCTS: Mild dilatation of the common bile duct to 0.9 cm. A 0.5 cm   calculus in the distal common bile duct at the level of the pancreatic   head is unchanged since 5/22/2019.  GALLBLADDER: Cholelithiasis and calcified gallbladder wall are again   noted.  SPLEEN: Within normal limits.  PANCREAS: Minimal infiltration of the fat adjacent to the pancreatic head   and proximalduodenum.  ADRENALS: Within normal limits.  KIDNEYS/URETERS: No hydronephrosis. Lobulated contour of the kidneys   bilaterally. Kidneys are mildly atrophic bilaterally.    BLADDER: Within normal limits.  REPRODUCTIVE ORGANS: Prostate gland is within normal limits.    BOWEL: No bowel obstruction. Appendix is within normal limits.  PERITONEUM: No ascites.  VESSELS: Atherosclerotic calcification of the aorta. Portal vein is   patent.  RETROPERITONEUM/LYMPH NODES: No lymphadenopathy.    ABDOMINAL WALL: A 3.6 x 2.4 cm complex mass involving the right rectus   muscle is unchanged. Fat-containing umbilical hernia. Again noted, is a   3.5 cm low-attenuation structure in the right lower quadrant, which may   be related to prior inguinal hernia repair and is unchanged since   5/25/2018.  BONES: Degenerative changes in the spine.    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    Choledocholithiasis, with mild dilatation of the common bile duct.     Cirrhotic configuration of the liver.    Minimal infiltration of the fat adjacent to the pancreatic head and   proximal duodenum, raising consideration of mild pancreatitis and/or   duodenitis..    < end of copied text >    < from: MR Abdomen w/wo IV Cont (09.13.18 @ 12:29) >  LIVER: Cirrhosis. Previously noted arterial enhancing foci within the   liver at prior imaging June 2018 are not identified on the current study.   It should be noted that arterial phase imaging is slightly limited.   Despite this limitation there is no evidence of abnormality at portal   venous or delayed phase imaging. No T1 or T2 signal abnormality in the   regions of interest.          BILE DUCTS: Normal caliber.  GALLBLADDER: Cholelithiasis.  SPLEEN: Within normal limits.  PANCREAS: Within normal limits.  ADRENALS: Within normal limits.  KIDNEYS/URETERS: Symmetric enhancement. No hydronephrosis or renal mass.   Previously noted abnormality in the lower pole of the right kidney has   resolved and may have been related to focal inflammation or infection.    VISUALIZED PORTIONS:    BOWEL: Within normal limits.   PERITONEUM:No ascites.  VESSELS: Hepatic and portal veins are patent.  RETROPERITONEUM: No lymphadenopathy.    ABDOMINAL WALL: A 3.9 cm lesion within the right anterior abdominal wall   without significant change from prior imaging dating to May 2018. Mild   peripheral T1 signal is noted without enhancement. Findings may be   related to a chronic hematoma. Fat-containing umbilical hernia.  BONES: Within normal limits.    IMPRESSION:     Cirrhosis. No evidence of hepatocellular cancer.    Interval resolutionof a previously noted abnormality within the lower   pole of the right kidney.          < end of copied text >

## 2019-10-30 NOTE — PROGRESS NOTE ADULT - PROBLEM SELECTOR PLAN 9
Decompensated ETOH Cirrhosis, previously on transplant list but no longer  MELD-Na 20. 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 No ascities on CT. 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  Liver lesion on MR concerning for HCC, repeat MR inconclusive.  - Mental status at baseline per wife, currently AOx3 (but states he has been getting forgetful with age)   - Trend MELD labs  - Propranolol 20 mg bid  - C/w lactulose 20g QHS  - F/u Ammonia Decompensated ETOH Cirrhosis, previously on transplant list but no longer  MELD-Na 20. 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 No ascities on CT. 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  Liver lesion on MR concerning for HCC, repeat MR inconclusive.  - Mental status at baseline per wife, currently AOx3 (but states he has been getting forgetful with age)   - Trend MELD labs  - Propranolol 20 mg bid  - decrease lactulose to 10 G BID, per hepatology  - ammonia wnl

## 2019-10-30 NOTE — PROGRESS NOTE ADULT - SUBJECTIVE AND OBJECTIVE BOX
Matt Rivas MD, PhD  PGY1  [phone] / [phone]    Interval Hx / Subjective:     MEDICATIONS  (STANDING):  influenza   Vaccine 0.5 milliLiter(s) IntraMuscular once  lactulose Syrup 20 Gram(s) Oral three times a day  octreotide  Infusion 50 MICROgram(s)/Hr (10 mL/Hr) IV Continuous <Continuous>  pantoprazole  Injectable 40 milliGRAM(s) IV Push two times a day  piperacillin/tazobactam IVPB.. 3.375 Gram(s) IV Intermittent every 8 hours  propranolol 10 milliGRAM(s) Oral two times a day    MEDICATIONS  (PRN):  acetaminophen   Tablet .. 650 milliGRAM(s) Oral every 6 hours PRN Mild Pain (1 - 3), Moderate Pain (4 - 6)    Objective:  Vital Signs Last 24 Hrs  T(C): 36.8 (30 Oct 2019 05:40), Max: 36.8 (30 Oct 2019 05:40)  T(F): 98.3 (30 Oct 2019 05:40), Max: 98.3 (30 Oct 2019 05:40)  HR: 68 (30 Oct 2019 05:40) (61 - 72)  BP: 114/69 (30 Oct 2019 05:40) (100/56 - 142/76)  BP(mean): --  RR: 18 (30 Oct 2019 05:40) (16 - 18)  SpO2: 100% (30 Oct 2019 05:40) (98% - 100%)    Physical Exam:  GEN: resting in bed, NAD  HEENT: PERRL; mucous moist; negative scleral icterus  CV: RRR, S1, S2; no M/R/G; good capillary refill  PULM: LCTAB; not using accessory muscles  ABD: normal bowel sounds; non-distended, soft; non-tender; no rebound, no guarding; no caput medusa  BACK: R CVAT; no L CVA tenderness;  Extremities: no clubbing, cyanosis; no edema; DP and radial pulses palpable; no asterixis  NEURO: AAOx3; no FND  SKIN: negative spider angioma on chest Matt Rivas MD, PhD  PGY1  [phone] / [phone]    Interval Hx / Subjective: maroon colored stool x1 this morning, denied abd pain but  (pt palpates his abd to demonstrate); denied lightheadedness, dizziness, SOB, CP, F/C    MEDICATIONS  (STANDING):  influenza   Vaccine 0.5 milliLiter(s) IntraMuscular once  lactulose Syrup 20 Gram(s) Oral three times a day  octreotide  Infusion 50 MICROgram(s)/Hr (10 mL/Hr) IV Continuous <Continuous>  pantoprazole  Injectable 40 milliGRAM(s) IV Push two times a day  piperacillin/tazobactam IVPB.. 3.375 Gram(s) IV Intermittent every 8 hours  propranolol 10 milliGRAM(s) Oral two times a day    MEDICATIONS  (PRN):  acetaminophen   Tablet .. 650 milliGRAM(s) Oral every 6 hours PRN Mild Pain (1 - 3), Moderate Pain (4 - 6)    Objective:  Vital Signs Last 24 Hrs  T(C): 36.8 (30 Oct 2019 05:40), Max: 36.8 (30 Oct 2019 05:40)  T(F): 98.3 (30 Oct 2019 05:40), Max: 98.3 (30 Oct 2019 05:40)  HR: 68 (30 Oct 2019 05:40) (61 - 72)  BP: 114/69 (30 Oct 2019 05:40) (100/56 - 142/76)  BP(mean): --  RR: 18 (30 Oct 2019 05:40) (16 - 18)  SpO2: 100% (30 Oct 2019 05:40) (98% - 100%)    Physical Exam:  GEN: resting in bed, NAD  HEENT: PERRL; mucous moist; negative scleral icterus  CV: RRR, S1, S2; no M/R/G; good capillary refill  PULM: LCTAB; not using accessory muscles  ABD: normal bowel sounds; non-distended, soft; non-tender; no rebound, no guarding; no caput medusa  BACK: R CVAT; no L CVA tenderness;  Extremities: no clubbing, cyanosis; no edema; DP and radial pulses palpable; no asterixis  NEURO: AAOx3; no FND  SKIN: negative spider angioma on chest Matt Rivas MD, PhD  PGY1  [phone] / [phone]    Interval Hx / Subjective: maroon colored stool x1 this morning, denied abd pain but  (pt palpates his abd to demonstrate); denied lightheadedness, dizziness, SOB, CP, F/C    MEDICATIONS  (STANDING):  influenza   Vaccine 0.5 milliLiter(s) IntraMuscular once  lactulose Syrup 20 Gram(s) Oral three times a day  octreotide  Infusion 50 MICROgram(s)/Hr (10 mL/Hr) IV Continuous <Continuous>  pantoprazole  Injectable 40 milliGRAM(s) IV Push two times a day  piperacillin/tazobactam IVPB.. 3.375 Gram(s) IV Intermittent every 8 hours  propranolol 10 milliGRAM(s) Oral two times a day    MEDICATIONS  (PRN):  acetaminophen   Tablet .. 650 milliGRAM(s) Oral every 6 hours PRN Mild Pain (1 - 3), Moderate Pain (4 - 6)    Objective:  Vital Signs Last 24 Hrs  T(C): 36.8 (30 Oct 2019 05:40), Max: 36.8 (30 Oct 2019 05:40)  T(F): 98.3 (30 Oct 2019 05:40), Max: 98.3 (30 Oct 2019 05:40)  HR: 68 (30 Oct 2019 05:40) (61 - 72)  BP: 114/69 (30 Oct 2019 05:40) (100/56 - 142/76)  BP(mean): --  RR: 18 (30 Oct 2019 05:40) (16 - 18)  SpO2: 100% (30 Oct 2019 05:40) (98% - 100%)    Physical Exam:  GEN: resting in bed, NAD  HEENT: PERRL; mucous moist; negative scleral icterus  CV: RRR, S1, S2; no M/R/G; good capillary refill  PULM: LCTAB; not using accessory muscles  ABD: normal bowel sounds; non-distended, soft; non-tender; no rebound, no guarding; no caput medusa  BACK: R CVAT; no L CVA tenderness;  Extremities: no clubbing, cyanosis; no edema; DP and radial pulses palpable; no asterixis  NEURO: AAOx3; no FND  SKIN: negative spider angioma on chest               13.8   9.23  )-----------( 224      ( 10-30 @ 06:30 )             46.5                13.7   11.96 )-----------( 245      ( 10-29 @ 22:20 )             45.5                14.2   15.62 )-----------( 272      ( 10-29 @ 15:25 )             46.2                14.7   14.17 )-----------( 276      ( 10-29 @ 06:50 )             47.4                15.6   13.46 )-----------( 281      ( 10-28 @ 11:42 )             47.7     10-30    136  |  103  |  43<H>  ----------------------------<  111<H>  4.2   |  19<L>  |  1.63<H>    Ca    9.1      30 Oct 2019 06:30  Phos  2.5     10-30  Mg     1.9     10-30    TPro  7.2  /  Alb  3.3  /  TBili  0.6  /  DBili  x   /  AST  32  /  ALT  23  /  AlkPhos  105  10-30  PT/INR - ( 30 Oct 2019 06:30 )   PT: 16.6 SEC;   INR: 1.44      PTT - ( 30 Oct 2019 06:30 )  PTT:28.3 SEC

## 2019-10-30 NOTE — CONSULT NOTE ADULT - ASSESSMENT
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      IMPRESSION  - Diffuse Abdominal pain without elevated liver enzymes (Tbili 0.8, AST 28, ALT 21, Alk phos 119), CT with CBD dilation to 0.9cm and choledocholithiasis, also seen on a previous CT scan  - Cholelithiasis and porcelain gallbladder  - Hematochezia, currently HD stable and Hb 14  - Cirrhosis     RECOMMENDATION    - trend CMP, INR  - will plan for ERCP no urgent indication as there are no signs of cholangitis and this finding was present on a previous CT scan as well (once medically cleared, currently with hematochezia)  - keep NPO after midnight   - surgery consult for eventual cholecystectomy  - recommend hepatology consult for evaluation of the hematochezia   - supportive care as per primary team 79 year old man from home, PMH of [facility] (1996, hospitalized for 4 months), decompensated alcoholic cirrhosis (varices,  no ascites per CT; 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      IMPRESSION  - Diffuse Abdominal pain - Improving   On CT abd - mild pancreatic inflammation, Lipase - WNL , LFT - WNL,   Elevated LA of 4 improved to 2.6, Elevated WBC of 15 improved to 9     - Choledocholithiasis without elevated liver enzymes (Tbili 0.8, AST 28, ALT 21, Alk phos 119), CT with CBD dilation to 0.9cm and choledocholithiasis, also seen on a previous CT scan  no obstruction, no cholangitis    - Porcelain gallbladder with gallstones      - Hematochezia   HD stable   Hb trended down from 15.6 to 14.7 to 13.8   Abd pain , Elevated LA + WBC     - Decompensated alcohol Liver Cirrhosis , MELD Na - 18  Last drink of alcohol - 18 months ago     Small EV on EGD 9/2019 , currently on Propanolol 10 BID , Current HR -68     No ascites , No splenomegaly, No Thrombocytopenia     H/o HE, currently AAO x3, no asterix     No coagulopathy , INR - 1.44    Recent CT abd with IV con - no liver mass 79 year old man from home, PMH of [facility] (1996, hospitalized for 4 months), decompensated alcoholic cirrhosis (varices,  no ascites per CT; 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      IMPRESSION  - Diffuse Abdominal pain - Improving   On CT abd - mild estrellita pancreatic inflammation, Lipase - WNL , LFT - WNL,   Elevated LA of 4 improved to 2.6, Elevated WBC of 15 improved to 9   possible sec to duodenitis/ mild pancreatitis / r/o Ischemic colitis   h/o H.pylori s/p treatment   Rec:  Protonix 40 mg daily     - Choledocholithiasis without elevated liver enzymes (Tbili 0.8, AST 28, ALT 21, Alk phos 119), CT with CBD dilation to 0.9cm and choledocholithiasis, also seen on a previous CT scan  no obstruction, no cholangitis  Rec   Elective ERCP once pt GI bleeding resolved     - Porcelain gallbladder with gallstones    given liver cirrhosis risks and benefits of CCY needs to be evaluated   outpatient follow up in liver clinic     - Hematochezia   HD stable   Hb trended down from 15.6 to 14.7 to 13.8   Abd pain , Elevated LA + WBC   * Stable lower GI bleed   Possible sec to hemorrhoidal vs AVM seen on prior colon in rectum and cecum vs unlikely sec to rectal varices   Rec   Clear liquid diet for now, NPO after midnight   trend cbc and transfuse as needed  Bowel prep, golytly 1 gallon and 10 mg dulcolax today   Will plan for Colonoscopy tomorrow   for now can c/w Iv octeriotide and IV ABX     - Decompensated alcohol Liver Cirrhosis , MELD Na - 18  Last drink of alcohol - 18 months ago     Small EV on EGD 9/2019 , currently on Propanolol 10 BID , Current HR -68     No ascites , No splenomegaly, No Thrombocytopenia     H/o HE, currently AAO x3, no asterix     No coagulopathy , INR - 1.44    Recent CT abd with IV con - no liver mass   Rec   C/w Propanolol 10 BID   abstinent from alcohol   given remote h/o HE rec to decrease lactulose to 10 mg bid 79 year old man from home, PMH of [facility] (1996, hospitalized for 4 months), decompensated alcoholic cirrhosis (varices,  no ascites per CT; 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      IMPRESSION  - Diffuse Abdominal pain - Improving   On CT abd - mild estrellita pancreatic inflammation, Lipase - WNL , LFT - WNL,   Elevated LA of 4 improved to 2.6, Elevated WBC of 15 improved to 9   possible sec to duodenitis/ mild pancreatitis / r/o Ischemic colitis   h/o H.pylori recently - unclear if he took treatment  Rec:  Protonix 40 mg daily     - Choledocholithiasis without elevated liver enzymes (Tbili 0.8, AST 28, ALT 21, Alk phos 119), CT with CBD dilation to 0.9cm and choledocholithiasis, also seen on a previous CT scan  no obstruction, no cholangitis  Rec   Elective ERCP once pt GI bleeding resolved     - Porcelain gallbladder with gallstones    given liver cirrhosis, 30d mortality was estimated to be >35% at 90 days recently, and it was discussed with the patient not to undergo cholecystectomy.        - Hematochezia   HD stable   Hb trended down from 15.6 to 14.7 to 13.8   Abd pain , Elevated LA + WBC   * Stable lower GI bleed   Possible sec to hemorrhoidal vs AVM seen on prior colon in rectum and cecum vs unlikely sec to rectal varices   Rec   Clear liquid diet for now, NPO after midnight   trend cbc and transfuse as needed  Bowel prep, golytly 1 gallon and 10 mg dulcolax today   Will plan for Colonoscopy tomorrow   for now can c/w Iv octeriotide and IV ABX     - Decompensated alcohol Liver Cirrhosis , MELD Na - 18  Last drink of alcohol - 18 months ago     EV bleed in 2018, banded, small varices on EGD 9/2019, currently on Propanolol 10 BID, Current HR -68     No ascites , No splenomegaly, No Thrombocytopenia     remote h/o HE, currently AAO x3, no asterixis     No coagulopathy , INR - 1.44    Recent CT abd with IV con - no liver mass   Rec   C/w Propanolol 10 BID   abstinent from alcohol   given remote h/o HE rec to decrease lactulose to 10 mg bid     Plan:  - hematochezia with Hb drop (no anemia): colonoscopy tomorrow. Clears today, bowel prep in evening, NPO after midnight. continue antibiotics  - abdominal pain, cholelithiasis: advanced GI planning ERCP  - H. pylori infection: PPI once daily PO  - varices that bled: continue propranolol, stop if becomes hypotensive/tachycardic  - constipation: hold lactulose given colonoscopy

## 2019-10-30 NOTE — PROGRESS NOTE ADULT - PROBLEM SELECTOR PLAN 3
patient had sepsis on admission with Leukocytosis and tachycardia to 130 on admission likely from UTI and possible GI translocation in setting of new GIB.  Lactate 4.4 now downtrending s/p IVF.  +UA. Lungs clear on CXR, blood and urine cultures pending.  Pt remains afebrile.   - starting empiric abx with zosyn    - IVF at 100cc/hr  - lactate downtrending

## 2019-10-30 NOTE — PROGRESS NOTE ADULT - SUBJECTIVE AND OBJECTIVE BOX
Chief Complaint:  Patient is a 79y old  Male who presents with a chief complaint of Abdominal Pain (30 Oct 2019 09:07)      Interval Events: Patient had reported bloody BM overnight    Allergies:  Allergy Status Unknown  No Known Allergies      Hospital Medications:  acetaminophen   Tablet .. 650 milliGRAM(s) Oral every 6 hours PRN  influenza   Vaccine 0.5 milliLiter(s) IntraMuscular once  lactulose Syrup 20 Gram(s) Oral three times a day  octreotide  Infusion 50 MICROgram(s)/Hr IV Continuous <Continuous>  pantoprazole  Injectable 40 milliGRAM(s) IV Push two times a day  piperacillin/tazobactam IVPB.. 3.375 Gram(s) IV Intermittent every 8 hours  propranolol 10 milliGRAM(s) Oral two times a day      PMHX/PSHX:  Liver cirrhosis  Guillain-Toledo syndrome  HTN (hypertension)  H/O inguinal hernia repair      Family history:  Family history of ovarian cancer (Sibling)      ROS:     General:  No wt loss, fevers, chills, night sweats, fatigue,   Eyes:  Good vision, no reported pain  ENT:  No sore throat, pain, runny nose, dysphagia  CV:  No pain, palpitations, hypo/hypertension  Resp:  No dyspnea, cough, tachypnea, wheezing  GI:  See HPI  :  No pain, bleeding, incontinence, nocturia  Muscle:  No pain, weakness  Neuro:  No weakness, tingling, memory problems  Psych:  No fatigue, insomnia, mood problems, depression  Endocrine:  No polyuria, polydipsia, cold/heat intolerance  Heme:  No petechiae, ecchymosis, easy bruisability  Skin:  No rash, edema    PHYSICAL EXAM:     GENERAL: NAD  HEENT:  NC/AT  CHEST:  Full & symmetric excursion, no increased effort  ABDOMEN:  Soft, non-tender, non-distended, +BS  EXTREMITIES:  no edema  SKIN:  No rash  NEURO:  Alert      Vital Signs:  Vital Signs Last 24 Hrs  T(C): 36.8 (30 Oct 2019 05:40), Max: 36.8 (30 Oct 2019 05:40)  T(F): 98.3 (30 Oct 2019 05:40), Max: 98.3 (30 Oct 2019 05:40)  HR: 68 (30 Oct 2019 05:40) (61 - 72)  BP: 114/69 (30 Oct 2019 05:40) (100/56 - 142/76)  BP(mean): --  RR: 18 (30 Oct 2019 05:40) (16 - 18)  SpO2: 100% (30 Oct 2019 05:40) (98% - 100%)  Daily     Daily     LABS:                        13.8   9.23  )-----------( 224      ( 30 Oct 2019 06:30 )             46.5     10-30    136  |  103  |  43<H>  ----------------------------<  111<H>  4.2   |  19<L>  |  1.63<H>    Ca    9.1      30 Oct 2019 06:30  Phos  2.5     10-30  Mg     1.9     10-30    TPro  7.2  /  Alb  3.3  /  TBili  0.6  /  DBili  x   /  AST  32  /  ALT  23  /  AlkPhos  105  10-30    LIVER FUNCTIONS - ( 30 Oct 2019 06:30 )  Alb: 3.3 g/dL / Pro: 7.2 g/dL / ALK PHOS: 105 u/L / ALT: 23 u/L / AST: 32 u/L / GGT: x           PT/INR - ( 30 Oct 2019 06:30 )   PT: 16.6 SEC;   INR: 1.44          PTT - ( 30 Oct 2019 06:30 )  PTT:28.3 SEC  Urinalysis Basic - ( 29 Oct 2019 03:20 )    Color: LIGHT YELLOW / Appearance: CLEAR / S.036 / pH: 6.0  Gluc: NEGATIVE / Ketone: NEGATIVE  / Bili: NEGATIVE / Urobili: NORMAL   Blood: SMALL / Protein: 30 / Nitrite: POSITIVE   Leuk Esterase: LARGE / RBC: 11-25 / WBC >50   Sq Epi: OCC / Non Sq Epi: x / Bacteria: MODERATE          Imaging:  < from: CT Abdomen and Pelvis w/ Oral Cont and w/ IV Cont (10.28.19 @ 14:20) >    EXAM:  CT ABDOMEN AND PELVIS OC IC        PROCEDURE DATE:  Oct 28 2019         INTERPRETATION:  CLINICAL INFORMATION: Right upper quadrant abdominal   pain.    COMPARISON: Prior abdominal CT dated 2019.    PROCEDURE:   CT of the Abdomen and Pelvis was performed with intravenous contrast.   Intravenous contrast: 90 ml Omnipaque 350. 10 ml discarded.  Oral contrast: None.  Sagittal and coronal reformats were performed.    FINDINGS:    LOWER CHEST: Coronary arterial calcification. Calcification of the aortic   valve. Right basilar linear atelectasis.    LIVER: Cirrhotic configuration of the liver again noted.  BILE DUCTS: Mild dilatation of the common bile duct to 0.9 cm. A 0.5 cm   calculus in the distal common bile duct at the level of the pancreatic   head is unchanged since 2019.  GALLBLADDER: Cholelithiasis and calcified gallbladder wall are again   noted.  SPLEEN: Within normal limits.  PANCREAS: Minimal infiltration of the fat adjacent to the pancreatic head   and proximalduodenum.  ADRENALS: Within normal limits.  KIDNEYS/URETERS: No hydronephrosis. Lobulated contour of the kidneys   bilaterally. Kidneys are mildly atrophic bilaterally.    BLADDER: Within normal limits.  REPRODUCTIVE ORGANS: Prostate gland is within normal limits.    BOWEL: No bowel obstruction. Appendix is within normal limits.  PERITONEUM: No ascites.  VESSELS: Atherosclerotic calcification of the aorta. Portal vein is   patent.  RETROPERITONEUM/LYMPH NODES: No lymphadenopathy.    ABDOMINAL WALL: A 3.6 x 2.4 cm complex mass involving the right rectus   muscle is unchanged. Fat-containing umbilical hernia. Again noted, is a   3.5 cm low-attenuation structure in the right lower quadrant, which may   be related to prior inguinal hernia repair and is unchanged since   2018.  BONES: Degenerative changes in the spine.    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    Choledocholithiasis, with mild dilatation of the common bile duct.     Cirrhotic configuration of the liver.    Minimal infiltration of the fat adjacent to the pancreatic head and   proximal duodenum, raising consideration of mild pancreatitis and/or   duodenitis..    NANCY HOYT M.D., ATTENDING RADIOLOGIST  This document has been electronically signed. Oct 28 2019  2:52PM                  < end of copied text >

## 2019-10-31 ENCOUNTER — RESULT REVIEW (OUTPATIENT)
Age: 79
End: 2019-10-31

## 2019-10-31 LAB
-  AMIKACIN: SIGNIFICANT CHANGE UP
-  AMPICILLIN/SULBACTAM: SIGNIFICANT CHANGE UP
-  AMPICILLIN: SIGNIFICANT CHANGE UP
-  AZTREONAM: SIGNIFICANT CHANGE UP
-  CEFAZOLIN: SIGNIFICANT CHANGE UP
-  CEFEPIME: SIGNIFICANT CHANGE UP
-  CEFOXITIN: SIGNIFICANT CHANGE UP
-  CEFTAZIDIME: SIGNIFICANT CHANGE UP
-  CEFTRIAXONE: SIGNIFICANT CHANGE UP
-  ERTAPENEM: SIGNIFICANT CHANGE UP
-  GENTAMICIN: SIGNIFICANT CHANGE UP
-  IMIPENEM: SIGNIFICANT CHANGE UP
-  LEVOFLOXACIN: SIGNIFICANT CHANGE UP
-  MEROPENEM: SIGNIFICANT CHANGE UP
-  NITROFURANTOIN: SIGNIFICANT CHANGE UP
-  PIPERACILLIN/TAZOBACTAM: SIGNIFICANT CHANGE UP
-  TIGECYCLINE: SIGNIFICANT CHANGE UP
-  TOBRAMYCIN: SIGNIFICANT CHANGE UP
-  TRIMETHOPRIM/SULFAMETHOXAZOLE: SIGNIFICANT CHANGE UP
ALBUMIN SERPL ELPH-MCNC: 3.2 G/DL — LOW (ref 3.3–5)
ALP SERPL-CCNC: 104 U/L — SIGNIFICANT CHANGE UP (ref 40–120)
ALT FLD-CCNC: 31 U/L — SIGNIFICANT CHANGE UP (ref 4–41)
ANION GAP SERPL CALC-SCNC: 14 MMO/L — SIGNIFICANT CHANGE UP (ref 7–14)
APTT BLD: 28.7 SEC — SIGNIFICANT CHANGE UP (ref 27.5–36.3)
AST SERPL-CCNC: 47 U/L — HIGH (ref 4–40)
BACTERIA UR CULT: SIGNIFICANT CHANGE UP
BILIRUB SERPL-MCNC: 0.6 MG/DL — SIGNIFICANT CHANGE UP (ref 0.2–1.2)
BUN SERPL-MCNC: 35 MG/DL — HIGH (ref 7–23)
CALCIUM SERPL-MCNC: 9.2 MG/DL — SIGNIFICANT CHANGE UP (ref 8.4–10.5)
CHLORIDE SERPL-SCNC: 102 MMOL/L — SIGNIFICANT CHANGE UP (ref 98–107)
CO2 SERPL-SCNC: 23 MMOL/L — SIGNIFICANT CHANGE UP (ref 22–31)
CREAT SERPL-MCNC: 1.77 MG/DL — HIGH (ref 0.5–1.3)
GLUCOSE SERPL-MCNC: 111 MG/DL — HIGH (ref 70–99)
HCT VFR BLD CALC: 39.6 % — SIGNIFICANT CHANGE UP (ref 39–50)
HCT VFR BLD CALC: 43.4 % — SIGNIFICANT CHANGE UP (ref 39–50)
HGB BLD-MCNC: 12.5 G/DL — LOW (ref 13–17)
HGB BLD-MCNC: 13.1 G/DL — SIGNIFICANT CHANGE UP (ref 13–17)
INR BLD: 1.52 — HIGH (ref 0.88–1.17)
LACTATE SERPL-SCNC: 2.2 MMOL/L — HIGH (ref 0.5–2)
MAGNESIUM SERPL-MCNC: 1.9 MG/DL — SIGNIFICANT CHANGE UP (ref 1.6–2.6)
MCHC RBC-ENTMCNC: 25.1 PG — LOW (ref 27–34)
MCHC RBC-ENTMCNC: 25.5 PG — LOW (ref 27–34)
MCHC RBC-ENTMCNC: 30.2 % — LOW (ref 32–36)
MCHC RBC-ENTMCNC: 31.6 % — LOW (ref 32–36)
MCV RBC AUTO: 80.7 FL — SIGNIFICANT CHANGE UP (ref 80–100)
MCV RBC AUTO: 83.1 FL — SIGNIFICANT CHANGE UP (ref 80–100)
METHOD TYPE: SIGNIFICANT CHANGE UP
NRBC # FLD: 0 K/UL — SIGNIFICANT CHANGE UP (ref 0–0)
NRBC # FLD: 0 K/UL — SIGNIFICANT CHANGE UP (ref 0–0)
ORGANISM # SPEC MICROSCOPIC CNT: SIGNIFICANT CHANGE UP
ORGANISM # SPEC MICROSCOPIC CNT: SIGNIFICANT CHANGE UP
PHOSPHATE SERPL-MCNC: 2.3 MG/DL — LOW (ref 2.5–4.5)
PLATELET # BLD AUTO: 201 K/UL — SIGNIFICANT CHANGE UP (ref 150–400)
PLATELET # BLD AUTO: 219 K/UL — SIGNIFICANT CHANGE UP (ref 150–400)
PMV BLD: 10.5 FL — SIGNIFICANT CHANGE UP (ref 7–13)
PMV BLD: 9.9 FL — SIGNIFICANT CHANGE UP (ref 7–13)
POTASSIUM SERPL-MCNC: 4 MMOL/L — SIGNIFICANT CHANGE UP (ref 3.5–5.3)
POTASSIUM SERPL-SCNC: 4 MMOL/L — SIGNIFICANT CHANGE UP (ref 3.5–5.3)
PROT SERPL-MCNC: 6.9 G/DL — SIGNIFICANT CHANGE UP (ref 6–8.3)
PROTHROM AB SERPL-ACNC: 17.6 SEC — HIGH (ref 9.8–13.1)
RBC # BLD: 4.91 M/UL — SIGNIFICANT CHANGE UP (ref 4.2–5.8)
RBC # BLD: 5.22 M/UL — SIGNIFICANT CHANGE UP (ref 4.2–5.8)
RBC # FLD: 16.6 % — HIGH (ref 10.3–14.5)
RBC # FLD: 17.1 % — HIGH (ref 10.3–14.5)
SODIUM SERPL-SCNC: 139 MMOL/L — SIGNIFICANT CHANGE UP (ref 135–145)
WBC # BLD: 7.81 K/UL — SIGNIFICANT CHANGE UP (ref 3.8–10.5)
WBC # BLD: 8.16 K/UL — SIGNIFICANT CHANGE UP (ref 3.8–10.5)
WBC # FLD AUTO: 7.81 K/UL — SIGNIFICANT CHANGE UP (ref 3.8–10.5)
WBC # FLD AUTO: 8.16 K/UL — SIGNIFICANT CHANGE UP (ref 3.8–10.5)

## 2019-10-31 PROCEDURE — 45380 COLONOSCOPY AND BIOPSY: CPT

## 2019-10-31 PROCEDURE — 45382 COLONOSCOPY W/CONTROL BLEED: CPT | Mod: 59,GC

## 2019-10-31 PROCEDURE — 99233 SBSQ HOSP IP/OBS HIGH 50: CPT | Mod: GC

## 2019-10-31 PROCEDURE — 99231 SBSQ HOSP IP/OBS SF/LOW 25: CPT | Mod: GC,25

## 2019-10-31 PROCEDURE — 88305 TISSUE EXAM BY PATHOLOGIST: CPT | Mod: 26

## 2019-10-31 RX ORDER — SODIUM CHLORIDE 9 MG/ML
1000 INJECTION, SOLUTION INTRAVENOUS
Refills: 0 | Status: DISCONTINUED | OUTPATIENT
Start: 2019-10-31 | End: 2019-10-31

## 2019-10-31 RX ADMIN — PIPERACILLIN AND TAZOBACTAM 25 GRAM(S): 4; .5 INJECTION, POWDER, LYOPHILIZED, FOR SOLUTION INTRAVENOUS at 01:35

## 2019-10-31 RX ADMIN — PANTOPRAZOLE SODIUM 40 MILLIGRAM(S): 20 TABLET, DELAYED RELEASE ORAL at 17:18

## 2019-10-31 RX ADMIN — Medication 10 MILLIGRAM(S): at 05:09

## 2019-10-31 RX ADMIN — OCTREOTIDE ACETATE 10 MICROGRAM(S)/HR: 200 INJECTION, SOLUTION INTRAVENOUS; SUBCUTANEOUS at 23:46

## 2019-10-31 RX ADMIN — PANTOPRAZOLE SODIUM 40 MILLIGRAM(S): 20 TABLET, DELAYED RELEASE ORAL at 05:06

## 2019-10-31 RX ADMIN — OCTREOTIDE ACETATE 10 MICROGRAM(S)/HR: 200 INJECTION, SOLUTION INTRAVENOUS; SUBCUTANEOUS at 06:24

## 2019-10-31 RX ADMIN — Medication 10 MILLIGRAM(S): at 17:18

## 2019-10-31 RX ADMIN — SODIUM CHLORIDE 30 MILLILITER(S): 9 INJECTION, SOLUTION INTRAVENOUS at 13:48

## 2019-10-31 RX ADMIN — PIPERACILLIN AND TAZOBACTAM 25 GRAM(S): 4; .5 INJECTION, POWDER, LYOPHILIZED, FOR SOLUTION INTRAVENOUS at 17:18

## 2019-10-31 RX ADMIN — PIPERACILLIN AND TAZOBACTAM 25 GRAM(S): 4; .5 INJECTION, POWDER, LYOPHILIZED, FOR SOLUTION INTRAVENOUS at 09:13

## 2019-10-31 NOTE — PROGRESS NOTE ADULT - SUBJECTIVE AND OBJECTIVE BOX
Matt Rivas MD, PhD  PGY1  [phone] / [id number]    Interval Hx / Subjective: maroon colored stool x1 this morning, denied abd pain but  (pt palpates his abd to demonstrate); denied lightheadedness, dizziness, SOB, CP, F/C    MEDICATIONS  (STANDING):  influenza   Vaccine 0.5 milliLiter(s) IntraMuscular once  lactulose Syrup 20 Gram(s) Oral three times a day  octreotide  Infusion 50 MICROgram(s)/Hr (10 mL/Hr) IV Continuous <Continuous>  pantoprazole  Injectable 40 milliGRAM(s) IV Push two times a day  piperacillin/tazobactam IVPB.. 3.375 Gram(s) IV Intermittent every 8 hours  propranolol 10 milliGRAM(s) Oral two times a day    MEDICATIONS  (PRN):  acetaminophen  Tablet .. 650 milliGRAM(s) Oral every 6 hours PRN Mild Pain (1 - 3), Moderate Pain (4 - 6)    Objective:  Vital Signs Last 24 Hrs  T(C): 36.8 (30 Oct 2019 05:40), Max: 36.8 (30 Oct 2019 05:40)  T(F): 98.3 (30 Oct 2019 05:40), Max: 98.3 (30 Oct 2019 05:40)  HR: 68 (30 Oct 2019 05:40) (61 - 72)  BP: 114/69 (30 Oct 2019 05:40) (100/56 - 142/76)  BP(mean): --  RR: 18 (30 Oct 2019 05:40) (16 - 18)  SpO2: 100% (30 Oct 2019 05:40) (98% - 100%)    Physical Exam:  GEN: resting in bed, NAD  HEENT: PERRL; mucous moist; negative scleral icterus  CV: RRR, S1, S2; no M/R/G; good capillary refill  PULM: LCTAB; not using accessory muscles  ABD: normal bowel sounds; non-distended, soft; non-tender; no rebound, no guarding; no caput medusa  BACK: R CVAT; no L CVA tenderness;  Extremities: no clubbing, cyanosis; no edema; DP and radial pulses palpable; no asterixis  NEURO: AAOx3; no FND  SKIN: negative spider angioma on chest Matt Rivas MD, PhD  PGY1  [phone] / [id number]    Interval Hx / Subjective: lots of loose stool after bowel prep, which he finished; felt cold last night, wanted thicker blankets than those available    MEDICATIONS  (STANDING):  influenza   Vaccine 0.5 milliLiter(s) IntraMuscular once  lactulose Syrup 20 Gram(s) Oral three times a day  octreotide  Infusion 50 MICROgram(s)/Hr (10 mL/Hr) IV Continuous <Continuous>  pantoprazole  Injectable 40 milliGRAM(s) IV Push two times a day  piperacillin/tazobactam IVPB.. 3.375 Gram(s) IV Intermittent every 8 hours  propranolol 10 milliGRAM(s) Oral two times a day    MEDICATIONS  (PRN):  acetaminophen  Tablet .. 650 milliGRAM(s) Oral every 6 hours PRN Mild Pain (1 - 3), Moderate Pain (4 - 6)    Objective:  Vital Signs Last 24 Hrs  T(C): 36.8 (30 Oct 2019 05:40), Max: 36.8 (30 Oct 2019 05:40)  T(F): 98.3 (30 Oct 2019 05:40), Max: 98.3 (30 Oct 2019 05:40)  HR: 68 (30 Oct 2019 05:40) (61 - 72)  BP: 114/69 (30 Oct 2019 05:40) (100/56 - 142/76)  BP(mean): --  RR: 18 (30 Oct 2019 05:40) (16 - 18)  SpO2: 100% (30 Oct 2019 05:40) (98% - 100%)    Physical Exam:  GEN: resting in bed, NAD  HEENT: PERRL; mucous moist; negative scleral icterus  CV: RRR, S1, S2; no M/R/G; good capillary refill  PULM: LCTAB; not using accessory muscles  ABD: normal bowel sounds; non-distended, soft; non-tender; no rebound, no guarding; no caput medusa  BACK: R CVAT; no L CVA tenderness;  Extremities: no clubbing, cyanosis; no edema; DP and radial pulses palpable; no asterixis  NEURO: AAOx3; no FND  SKIN: negative spider angioma on chest Matt Rivas MD, PhD  PGY1  [phone] / [phone]    Interval Hx / Subjective: lots of loose stool after bowel prep, which he finished; felt cold last night, wanted thicker blankets than those available; no additional complaint to me; denied lightheadedness, HA, CP, racing heart, skipped beat, SOB, difficulty breathing, abd pain, dysuria, leg swelling, jaundice    MEDICATIONS  (STANDING):  influenza   Vaccine 0.5 milliLiter(s) IntraMuscular once  lactulose Syrup 20 Gram(s) Oral three times a day  octreotide  Infusion 50 MICROgram(s)/Hr (10 mL/Hr) IV Continuous <Continuous>  pantoprazole  Injectable 40 milliGRAM(s) IV Push two times a day  piperacillin/tazobactam IVPB.. 3.375 Gram(s) IV Intermittent every 8 hours  propranolol 10 milliGRAM(s) Oral two times a day    MEDICATIONS  (PRN):  acetaminophen  Tablet .. 650 milliGRAM(s) Oral every 6 hours PRN Mild Pain (1 - 3), Moderate Pain (4 - 6)    Objective:  Vital Signs Last 24 Hrs  T(C): 36.4 (31 Oct 2019 05:09), Max: 36.6 (30 Oct 2019 21:44)  T(F): 97.6 (31 Oct 2019 05:09), Max: 97.9 (30 Oct 2019 21:44)  HR: 50 (31 Oct 2019 05:09) (50 - 65)  BP: 129/72 (31 Oct 2019 05:09) (112/61 - 129/72)  BP(mean): --  RR: 18 (31 Oct 2019 05:09) (17 - 18)  SpO2: 100% (31 Oct 2019 05:09) (98% - 100%)    Physical Exam:  GEN: resting in bed, NAD  CV: RRR, S1, S2; no M/R/G  PULM: LCTAB; not using accessory muscles  ABD: diffuse upper abd tenderness; normal bowel sounds; non-distended, soft; no rebound, no guarding; no caput medusa  BACK: no CVAT  Extremities: no clubbing, cyanosis; no edema; DP and radial pulses palpable; no asterixis  NEURO: no FND  SKIN: negative spider angioma on chest Matt Rivas MD, PhD  PGY1  [phone] / [id number]    Interval Hx / Subjective: lots of loose stool after bowel prep, which he finished; felt cold last night, wanted thicker blankets than those available; no additional complaint to me; denied lightheadedness, HA, CP, racing heart, skipped beat, SOB, difficulty breathing, abd pain, dysuria, leg swelling, jaundice    MEDICATIONS  (STANDING):  influenza   Vaccine 0.5 milliLiter(s) IntraMuscular once  lactulose Syrup 20 Gram(s) Oral three times a day  octreotide  Infusion 50 MICROgram(s)/Hr (10 mL/Hr) IV Continuous <Continuous>  pantoprazole  Injectable 40 milliGRAM(s) IV Push two times a day  piperacillin/tazobactam IVPB.. 3.375 Gram(s) IV Intermittent every 8 hours  propranolol 10 milliGRAM(s) Oral two times a day    MEDICATIONS  (PRN):  acetaminophen  Tablet .. 650 milliGRAM(s) Oral every 6 hours PRN Mild Pain (1 - 3), Moderate Pain (4 - 6)    Objective:  Vital Signs Last 24 Hrs  T(C): 36.4 (31 Oct 2019 05:09), Max: 36.6 (30 Oct 2019 21:44)  T(F): 97.6 (31 Oct 2019 05:09), Max: 97.9 (30 Oct 2019 21:44)  HR: 50 (31 Oct 2019 05:09) (50 - 65)  BP: 129/72 (31 Oct 2019 05:09) (112/61 - 129/72)  BP(mean): --  RR: 18 (31 Oct 2019 05:09) (17 - 18)  SpO2: 100% (31 Oct 2019 05:09) (98% - 100%)    Physical Exam:  GEN: resting in bed, NAD  CV: RRR, S1, S2; no M/R/G  PULM: LCTAB; not using accessory muscles  ABD: diffuse upper abd tenderness; normal bowel sounds; non-distended, soft; no rebound, no guarding; no caput medusa  BACK: no CVAT  Extremities: no clubbing, cyanosis; no edema; DP and radial pulses palpable; no asterixis  NEURO: no FND  SKIN: negative spider angioma on chest               13.1   7.81  )-----------( 219      ( 10-31 @ 05:15 )             43.4                12.9   9.36  )-----------( 213      ( 10-30 @ 23:20 )             41.1                12.6   8.56  )-----------( 209      ( 10-30 @ 14:20 )             40.5                13.8   9.23  )-----------( 224      ( 10-30 @ 06:30 )             46.5                13.7   11.96 )-----------( 245      ( 10-29 @ 22:20 )             45.5                14.2   15.62 )-----------( 272      ( 10-29 @ 15:25 )             46.2                14.7   14.17 )-----------( 276      ( 10-29 @ 06:50 )             47.4     10-31    139  |  102  |  35<H>  ----------------------------<  111<H>  4.0   |  23  |  1.77<H>    Ca    9.2      31 Oct 2019 05:15  Phos  2.3     10-31  Mg     1.9     10-31    TPro  6.9  /  Alb  3.2<L>  /  TBili  0.6  /  DBili  x   /  AST  47<H>  /  ALT  31  /  AlkPhos  104  10-31    PT/INR - ( 31 Oct 2019 05:18 )   PT: 17.6 SEC;   INR: 1.52     PTT - ( 31 Oct 2019 05:18 )  PTT:28.7 SEC    Lactate, Blood (10.30.19 @ 23:20)    Lactate, Blood: 2.2: Elevated lactate. Consider ordering follow-up lactate to  trend. mmol/L    Culture - Urine (10.29.19 @ 04:08)    -  Trimethoprim/Sulfamethoxazole: S <=0.5/9.5 ONIEL    Culture - Urine:   COLONY COUNT: 10,000-49,000 CFU/mL    -  Tobramycin: S <=2 ONIEL    -  Tigecycline: S <=1 ONIEL    -  Piperacillin/Tazobactam: S <=8 ONIEL    -  Nitrofurantoin: S <=32 ONIEL    -  Meropenem: S <=1 ONIEL    -  Levofloxacin: R >4 ONIEL    -  Imipenem: S <=1 ONIEL    -  Gentamicin: S <=1 ONIEL    -  Ertapenem: S <=0.5 ONIEL    -  Ceftriaxone: S <=1 ONIEL    -  Cefoxitin: S <=4 ONIEL    -  Ceftazidime: S <=1 ONIEL    -  Cefepime: S <=2 ONIEL    -  Cefazolin: S <=2 ONIEL    -  Aztreonam: S <=4 ONIEL    -  Ampicillin/Sulbactam: S <=4/2 ONIEL    -  Ampicillin: S <=2 ONIEL    -  Amikacin: S <=8 ONIEL    Specimen Source: URINE MIDSTREAM    Organism Identification: Escherichia coli    Organism: Escherichia coli    Method Type: NEGATIVE ONIEL 43 Matt Rivas MD, PhD  PGY1  [phone] / [id number]    Interval Hx / Subjective: lots of loose stool after bowel prep, which he finished; felt cold last night, wanted thicker blankets than those available; no additional complaint to me; denied lightheadedness, HA, CP, racing heart, skipped beat, SOB, difficulty breathing, abd pain, dysuria, leg swelling, jaundice    MEDICATIONS  (STANDING):  influenza   Vaccine 0.5 milliLiter(s) IntraMuscular once  lactulose Syrup 20 Gram(s) Oral three times a day  octreotide  Infusion 50 MICROgram(s)/Hr (10 mL/Hr) IV Continuous <Continuous>  pantoprazole  Injectable 40 milliGRAM(s) IV Push two times a day  piperacillin/tazobactam IVPB.. 3.375 Gram(s) IV Intermittent every 8 hours  propranolol 10 milliGRAM(s) Oral two times a day    MEDICATIONS  (PRN):  acetaminophen  Tablet .. 650 milliGRAM(s) Oral every 6 hours PRN Mild Pain (1 - 3), Moderate Pain (4 - 6)    Objective:  Vital Signs Last 24 Hrs  T(C): 36.4 (31 Oct 2019 05:09), Max: 36.6 (30 Oct 2019 21:44)  T(F): 97.6 (31 Oct 2019 05:09), Max: 97.9 (30 Oct 2019 21:44)  HR: 50 (31 Oct 2019 05:09) (50 - 65)  BP: 129/72 (31 Oct 2019 05:09) (112/61 - 129/72)  BP(mean): --  RR: 18 (31 Oct 2019 05:09) (17 - 18)  SpO2: 100% (31 Oct 2019 05:09) (98% - 100%)    Physical Exam:  GEN: resting in bed, NAD  CV: RRR, S1, S2; no M/R/G  PULM: LCTAB; not using accessory muscles  ABD: diffuse upper abd tenderness; normal bowel sounds; non-distended, soft; no rebound, no guarding; no caput medusae  BACK: no CVAT  Extremities: no clubbing, cyanosis; no edema; DP and radial pulses palpable; no asterixis  NEURO: no FND  SKIN: negative spider angioma on chest               13.1   7.81  )-----------( 219      ( 10-31 @ 05:15 )             43.4                12.9   9.36  )-----------( 213      ( 10-30 @ 23:20 )             41.1                12.6   8.56  )-----------( 209      ( 10-30 @ 14:20 )             40.5                13.8   9.23  )-----------( 224      ( 10-30 @ 06:30 )             46.5                13.7   11.96 )-----------( 245      ( 10-29 @ 22:20 )             45.5                14.2   15.62 )-----------( 272      ( 10-29 @ 15:25 )             46.2                14.7   14.17 )-----------( 276      ( 10-29 @ 06:50 )             47.4     10-31    139  |  102  |  35<H>  ----------------------------<  111<H>  4.0   |  23  |  1.77<H>    Ca    9.2      31 Oct 2019 05:15  Phos  2.3     10-31  Mg     1.9     10-31    TPro  6.9  /  Alb  3.2<L>  /  TBili  0.6  /  DBili  x   /  AST  47<H>  /  ALT  31  /  AlkPhos  104  10-31    PT/INR - ( 31 Oct 2019 05:18 )   PT: 17.6 SEC;   INR: 1.52     PTT - ( 31 Oct 2019 05:18 )  PTT:28.7 SEC    Lactate, Blood (10.30.19 @ 23:20)    Lactate, Blood: 2.2: Elevated lactate. Consider ordering follow-up lactate to  trend. mmol/L    Culture - Urine (10.29.19 @ 04:08)    -  Trimethoprim/Sulfamethoxazole: S <=0.5/9.5 ONIEL    Culture - Urine:   COLONY COUNT: 10,000-49,000 CFU/mL    -  Tobramycin: S <=2 ONIEL    -  Tigecycline: S <=1 ONIEL    -  Piperacillin/Tazobactam: S <=8 ONIEL    -  Nitrofurantoin: S <=32 ONIEL    -  Meropenem: S <=1 ONIEL    -  Levofloxacin: R >4 ONIEL    -  Imipenem: S <=1 ONIEL    -  Gentamicin: S <=1 ONIEL    -  Ertapenem: S <=0.5 ONIEL    -  Ceftriaxone: S <=1 ONIEL    -  Cefoxitin: S <=4 ONIEL    -  Ceftazidime: S <=1 ONIEL    -  Cefepime: S <=2 ONIEL    -  Cefazolin: S <=2 ONIEL    -  Aztreonam: S <=4 ONIEL    -  Ampicillin/Sulbactam: S <=4/2 ONIEL    -  Ampicillin: S <=2 ONIEL    -  Amikacin: S <=8 ONIEL    Specimen Source: URINE MIDSTREAM    Organism Identification: Escherichia coli    Organism: Escherichia coli    Method Type: NEGATIVE ONIEL 43

## 2019-10-31 NOTE — MEDICAL STUDENT PROGRESS NOTE(EDUCATION) - SUBJECTIVE AND OBJECTIVE BOX
INCOMPLETE NOTE      KETAN TIPTON  79y  Male      Patient is a 79y old  Male who presents with a chief complaint of Abdominal Pain (31 Oct 2019 07:14)      INTERVAL HPI/OVERNIGHT EVENTS: MD contacted overnight for HR 50 bpm. Patient found to be comfortable in NAD. No action taken. Patient interviewed and examined at bedside. Patient reports continued decreased abdominal pain. Pt reports that he has been feeling cold all night and is unable to keep warm. Endorses chills, sore throat, continued cough now productive with clear/white sputum. Denies nausea, vomiting, CP, SOB, diarrhea, constipation, blood in stool, coughing up blood and melena.       REVIEW OF SYSTEMS:  CONSTITUTIONAL: +Chills. No fever or fatigue.  ENMT:  +Throat pain, no sinus pain or runny nose.  RESPIRATORY: + cough. No wheezing, chills or hemoptysis; No shortness of breath  CARDIOVASCULAR: No chest pain, palpitations, dizziness, or leg swelling  GASTROINTESTINAL: No abdominal pain. No nausea, vomiting, or hematemesis; No diarrhea or constipation. No melena or hematochezia.  GENITOURINARY: No dysuria, frequency, hematuria, or incontinence  NEUROLOGICAL: No headaches.  ENDOCRINE: Endorses cold intolerance    HOSPITAL MEDICATIONS  acetaminophen   Tablet .. 650 milliGRAM(s) Oral every 6 hours PRN  influenza   Vaccine 0.5 milliLiter(s) IntraMuscular once  lactated ringers. 1000 milliLiter(s) IV Continuous <Continuous>  lactulose Syrup 10 Gram(s) Oral two times a day  octreotide  Infusion 50 MICROgram(s)/Hr IV Continuous <Continuous>  pantoprazole  Injectable 40 milliGRAM(s) IV Push two times a day  piperacillin/tazobactam IVPB.. 3.375 Gram(s) IV Intermittent every 8 hours  propranolol 10 milliGRAM(s) Oral two times a day    Vital Signs Last 24 Hrs  T(C): 36.4 (31 Oct 2019 05:09), Max: 36.6 (30 Oct 2019 21:44)  T(F): 97.6 (31 Oct 2019 05:09), Max: 97.9 (30 Oct 2019 21:44)  HR: 50 (31 Oct 2019 05:09) (50 - 65)  BP: 129/72 (31 Oct 2019 05:09) (112/61 - 129/72)  BP(mean): --  RR: 18 (31 Oct 2019 05:09) (17 - 18)  SpO2: 100% (31 Oct 2019 05:09) (98% - 100%)    PHYSICAL EXAM:  GENERAL: NAD  EYES: EOMI, PERRLA, conjunctiva and sclera clear  ENMT: No tonsillar erythema, exudates, or enlargement; Moist mucous membranes.  NERVOUS SYSTEM:  Alert & Oriented X3  CHEST/LUNG: Clear to percussion bilaterally; No rales, rhonchi, wheezing, or rubs  HEART: Bradycardic, regular rhythm; No murmurs, rubs, or gallops  ABDOMEN: Soft, tender to palpation in RUQ, LUQ. Nondistended; Bowel sounds normoactive  EXTREMITIES:  2+ Peripheral Pulses, No clubbing, cyanosis, or edema  SKIN: No rashes or lesions      LABS:                        12.9   9.36  )-----------( 213      ( 30 Oct 2019 23:20 )             41.1     10-30    136  |  103  |  43<H>  ----------------------------<  111<H>  4.2   |  19<L>  |  1.63<H>    Ca    9.1      30 Oct 2019 06:30  Phos  2.5     10-30  Mg     1.9     10-30    TPro  7.2  /  Alb  3.3  /  TBili  0.6  /  DBili  x   /  AST  32  /  ALT  23  /  AlkPhos  105  10-30    PT/INR - ( 30 Oct 2019 06:30 )   PT: 16.6 SEC;   INR: 1.44          PTT - ( 30 Oct 2019 06:30 )  PTT:28.3 SEC    79y  Male    RADIOLOGY & ADDITIONAL TESTS: INCOMPLETE NOTE      KETAN TIPTON  79y  Male      Patient is a 79y old  Male who presents with a chief complaint of Abdominal Pain (31 Oct 2019 07:14)      INTERVAL HPI/OVERNIGHT EVENTS: MD contacted overnight for HR 50 bpm. Patient found to be comfortable in NAD. No action taken. Patient interviewed and examined at bedside. Patient reports continued decreased abdominal pain. Pt reports that he has been feeling cold all night and is unable to keep warm. Endorses chills, sore throat, continued cough now productive with clear/white sputum. Denies nausea, vomiting, CP, SOB, diarrhea, constipation, blood in stool, coughing up blood and melena.       REVIEW OF SYSTEMS:  CONSTITUTIONAL: +Chills. No fever or fatigue.  ENMT:  +Throat pain, no sinus pain or runny nose.  RESPIRATORY: + cough. No wheezing, chills or hemoptysis; No shortness of breath  CARDIOVASCULAR: No chest pain, palpitations, dizziness, or leg swelling  GASTROINTESTINAL: No abdominal pain. No nausea, vomiting, or hematemesis; No diarrhea or constipation. No melena or hematochezia.  GENITOURINARY: No dysuria, frequency, hematuria, or incontinence  NEUROLOGICAL: No headaches.  ENDOCRINE: Endorses cold intolerance    HOSPITAL MEDICATIONS  acetaminophen   Tablet .. 650 milliGRAM(s) Oral every 6 hours PRN  influenza   Vaccine 0.5 milliLiter(s) IntraMuscular once  lactated ringers. 1000 milliLiter(s) IV Continuous <Continuous>  lactulose Syrup 10 Gram(s) Oral two times a day  octreotide  Infusion 50 MICROgram(s)/Hr IV Continuous <Continuous>  pantoprazole  Injectable 40 milliGRAM(s) IV Push two times a day  piperacillin/tazobactam IVPB.. 3.375 Gram(s) IV Intermittent every 8 hours  propranolol 10 milliGRAM(s) Oral two times a day    Vital Signs Last 24 Hrs  T(C): 36.4 (31 Oct 2019 05:09), Max: 36.6 (30 Oct 2019 21:44)  T(F): 97.6 (31 Oct 2019 05:09), Max: 97.9 (30 Oct 2019 21:44)  HR: 50 (31 Oct 2019 05:09) (50 - 65)  BP: 129/72 (31 Oct 2019 05:09) (112/61 - 129/72)  BP(mean): --  RR: 18 (31 Oct 2019 05:09) (17 - 18)  SpO2: 100% (31 Oct 2019 05:09) (98% - 100%)    PHYSICAL EXAM:  GENERAL: NAD  EYES: EOMI, PERRLA, conjunctiva and sclera clear  ENMT: No tonsillar erythema, exudates, or enlargement; Moist mucous membranes.  NERVOUS SYSTEM:  Alert & Oriented X3  CHEST/LUNG: Clear to percussion bilaterally; No rales, rhonchi, wheezing, or rubs  HEART: Bradycardic, regular rhythm; No murmurs, rubs, or gallops  ABDOMEN: Soft, tender to palpation in RUQ, LUQ. Nondistended; Bowel sounds normoactive  EXTREMITIES:  2+ Peripheral Pulses, No clubbing, cyanosis, or edema  SKIN: No rashes or lesions      LABS:                        12.9   9.36  )-----------( 213      ( 30 Oct 2019 23:20 )             41.1     10-30    136  |  103  |  43<H>  ----------------------------<  111<H>  4.2   |  19<L>  |  1.63<H>    Ca    9.1      30 Oct 2019 06:30  Phos  2.5     10-30  Mg     1.9     10-30    TPro  7.2  /  Alb  3.3  /  TBili  0.6  /  DBili  x   /  AST  32  /  ALT  23  /  AlkPhos  105  10-30    PT/INR - ( 30 Oct 2019 06:30 )   PT: 16.6 SEC;   INR: 1.44          PTT - ( 30 Oct 2019 06:30 )  PTT:28.3 SEC    79y  Male      Culture - Urine:  Positive for E. Coli  COLONY COUNT: 10,000-49,000 CFU/mL (10.29.19 @ 04:08) SAEED KETAN  79y  Male      Patient is a 79y old  Male who presents with a chief complaint of Abdominal Pain (31 Oct 2019 07:14)      INTERVAL HPI/OVERNIGHT EVENTS: MD contacted overnight for HR 50 bpm. Patient found to be comfortable in NAD. No action taken. Patient interviewed and examined at bedside. Patient reports continued decreased abdominal pain. Pt reports that he has been feeling cold all night and is unable to keep warm. Endorses chills, sore throat, continued cough now productive with clear/white sputum. Denies nausea, vomiting, CP, SOB, diarrhea, constipation, blood in stool, coughing up blood and melena.       REVIEW OF SYSTEMS:  CONSTITUTIONAL: +Chills. No fever or fatigue.  ENMT:  +Throat pain, no sinus pain or runny nose.  RESPIRATORY: + cough. No wheezing, chills or hemoptysis; No shortness of breath  CARDIOVASCULAR: No chest pain, palpitations, dizziness, or leg swelling  GASTROINTESTINAL: No abdominal pain. No nausea, vomiting, or hematemesis; No diarrhea or constipation. No melena or hematochezia.  GENITOURINARY: No dysuria, frequency, hematuria, or incontinence  NEUROLOGICAL: No headaches.  ENDOCRINE: Endorses cold intolerance    HOSPITAL MEDICATIONS  acetaminophen   Tablet .. 650 milliGRAM(s) Oral every 6 hours PRN  influenza   Vaccine 0.5 milliLiter(s) IntraMuscular once  lactated ringers. 1000 milliLiter(s) IV Continuous <Continuous>  lactulose Syrup 10 Gram(s) Oral two times a day  octreotide  Infusion 50 MICROgram(s)/Hr IV Continuous <Continuous>  pantoprazole  Injectable 40 milliGRAM(s) IV Push two times a day  piperacillin/tazobactam IVPB.. 3.375 Gram(s) IV Intermittent every 8 hours  propranolol 10 milliGRAM(s) Oral two times a day    Vital Signs Last 24 Hrs  T(C): 36.4 (31 Oct 2019 05:09), Max: 36.6 (30 Oct 2019 21:44)  T(F): 97.6 (31 Oct 2019 05:09), Max: 97.9 (30 Oct 2019 21:44)  HR: 50 (31 Oct 2019 05:09) (50 - 65)  BP: 129/72 (31 Oct 2019 05:09) (112/61 - 129/72)  BP(mean): --  RR: 18 (31 Oct 2019 05:09) (17 - 18)  SpO2: 100% (31 Oct 2019 05:09) (98% - 100%)    PHYSICAL EXAM:  GENERAL: NAD  EYES: EOMI, PERRLA, conjunctiva and sclera clear  ENMT: No tonsillar erythema, exudates, or enlargement; Moist mucous membranes.  NERVOUS SYSTEM:  Alert & Oriented X3  CHEST/LUNG: Clear to percussion bilaterally; No rales, rhonchi, wheezing, or rubs  HEART: Bradycardic, regular rhythm; No murmurs, rubs, or gallops  ABDOMEN: Soft, tender to palpation in RUQ, LUQ. Nondistended; Bowel sounds normoactive  EXTREMITIES:  2+ Peripheral Pulses, No clubbing, cyanosis, or edema  SKIN: No rashes or lesions      LABS:                        12.9   9.36  )-----------( 213      ( 30 Oct 2019 23:20 )             41.1     10-30    136  |  103  |  43<H>  ----------------------------<  111<H>  4.2   |  19<L>  |  1.63<H>    Ca    9.1      30 Oct 2019 06:30  Phos  2.5     10-30  Mg     1.9     10-30    TPro  7.2  /  Alb  3.3  /  TBili  0.6  /  DBili  x   /  AST  32  /  ALT  23  /  AlkPhos  105  10-30    PT/INR - ( 30 Oct 2019 06:30 )   PT: 16.6 SEC;   INR: 1.44          PTT - ( 30 Oct 2019 06:30 )  PTT:28.3 SEC    79y  Male      Culture - Urine:  Positive for E. Coli  COLONY COUNT: 10,000-49,000 CFU/mL (10.29.19 @ 04:08)

## 2019-10-31 NOTE — PROGRESS NOTE ADULT - PROBLEM SELECTOR PLAN 5
Elevated lactate on presentation, concern for infection vs mesenteric ischemia    - c/w IVF as above   - trend lactate q6h Elevated lactate on presentation, concern for infection vs mesenteric ischemia    - c/w IVF as above   - not trending lactate

## 2019-10-31 NOTE — MEDICAL STUDENT PROGRESS NOTE(EDUCATION) - NS MD HP STUD ASPLAN ASSES FT
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

## 2019-10-31 NOTE — PROGRESS NOTE ADULT - SUBJECTIVE AND OBJECTIVE BOX
GI HPI Today:  Patient is a 79y old  Male who presents with a chief complaint of Abdominal Pain (31 Oct 2019 07:14)  Interval Events:   Pt took bowel prep last night , yellow coloured liquid BM   Last bloody BM (Maroon coloured) was yesterday in am   No blood transfusions overnight   Abd pain improved, no nausea, no vomiting , no fever.     PAST MEDICAL & SURGICAL HISTORY  Liver cirrhosis  Guillain-Andrews syndrome  HTN (hypertension)  H/O inguinal hernia repair      ALLERGIES:  Allergy Status Unknown  No Known Allergies      MEDICATIONS:  MEDICATIONS  (STANDING):  influenza   Vaccine 0.5 milliLiter(s) IntraMuscular once  lactated ringers. 1000 milliLiter(s) (100 mL/Hr) IV Continuous <Continuous>  lactulose Syrup 10 Gram(s) Oral two times a day  octreotide  Infusion 50 MICROgram(s)/Hr (10 mL/Hr) IV Continuous <Continuous>  pantoprazole  Injectable 40 milliGRAM(s) IV Push two times a day  piperacillin/tazobactam IVPB.. 3.375 Gram(s) IV Intermittent every 8 hours  propranolol 10 milliGRAM(s) Oral two times a day    MEDICATIONS  (PRN):  acetaminophen   Tablet .. 650 milliGRAM(s) Oral every 6 hours PRN Mild Pain (1 - 3), Moderate Pain (4 - 6)      REVIEW OF SYSTEMS  General:  No fevers  Eyes:  No reported pain   ENT:  No sore throat   NECK: No stiffness   CV:  No chest pain   Resp:  No shortness of breath  GI:  See HPI  :  No dysuria  Muscle:  No weakness  Neuro:  No tingling  Endocrine:  No polyuria  Heme:  No ecchymosis        VITALS:   T(F): 97.6 (10-31 @ 05:09), Max: 100 (10-28 @ 11:47)  HR: 50 (10-31 @ 05:09) (50 - 130)  BP: 129/72 (10-31 @ 05:09) (100/56 - 170/90)  BP(mean): --  RR: 18 (10-31 @ 05:09) (15 - 20)  SpO2: 100% (10-31 @ 05:09) (98% - 100%)        PHYSICAL EXAM:  Gen: comfortable   EYES: No scleral icterus   LUNG: Clear to auscultation bilaterally;   HEART: s1 and s2 heard   ABDOMEN: Soft, +BS, no abd distension, no Abdominal Tenderness, No guarding, No Garduno Sign   Neuro: AAO x3 , no asterix        Blood Work :                        13.1   7.81  )-----------( 219      ( 31 Oct 2019 05:15 )             43.4     PT/INR - ( 31 Oct 2019 05:18 )  INR: 1.52          PTT - ( 31 Oct 2019 05:18 )  PTT:28.7   10-31    139  |  102  |  35<H>  ----------------------------<  111<H>  4.0   |  23  |  1.77<H>    Ca    9.2      31 Oct 2019 05:15  Phos  2.3     10-31  Mg     1.9     10-31      CBC -  ( 31 Oct 2019 05:15 )  Hemoglobin : 13.1    CBC -  ( 30 Oct 2019 23:20 )  Hemoglobin : 12.9    CBC -  ( 30 Oct 2019 14:20 )  Hemoglobin : 12.6    CBC -  ( 30 Oct 2019 06:30 )  Hemoglobin : 13.8    CBC -  ( 29 Oct 2019 22:20 )  Hemoglobin : 13.7    CBC -  ( 29 Oct 2019 15:25 )  Hemoglobin : 14.2    CBC -  ( 29 Oct 2019 06:50 )  Hemoglobin : 14.7    CBC -  ( 28 Oct 2019 11:42 )  Hemoglobin : 15.6      LIVER FUNCTIONS - ( 31 Oct 2019 05:15 )  Alb: 3.2 [3.3 - 5.0] / Pro: 6.9 [6.0 - 8.3] / ALK PHOS: 104 [40 - 120] / ALT: 31 [4 - 41] / AST: 47 [4 - 40] / GGT: x     LIVER FUNCTIONS - ( 30 Oct 2019 06:30 )  Alb: 3.3 [3.3 - 5.0] / Pro: 7.2 [6.0 - 8.3] / ALK PHOS: 105 [40 - 120] / ALT: 23 [4 - 41] / AST: 32 [4 - 40] / GGT: x     LIVER FUNCTIONS - ( 29 Oct 2019 06:50 )  Alb: 3.4 [3.3 - 5.0] / Pro: 7.3 [6.0 - 8.3] / ALK PHOS: 119 [40 - 120] / ALT: 21 [4 - 41] / AST: 28 [4 - 40] / GGT: x     LIVER FUNCTIONS - ( 28 Oct 2019 11:42 )  Alb: 3.6 [3.3 - 5.0] / Pro: 8.2 [6.0 - 8.3] / ALK PHOS: 147 [40 - 120] / ALT: 27 [4 - 41] / AST: 43 [4 - 40] / GGT: x       Previous EGD:   < from: Upper Endoscopy (09.11.19 @ 10:04) >      - Small (< 5 mm) esophageal varices.                       - Normal stomach. Biopsied.                       - Granular, erythematous mucosa in the first part of the                        duodenum. Biopsied.                       - The examination was otherwise normal.    < end of copied text >    Path - + H.pylori     Previous colonoscopy:     < from: Colonoscopy (09.11.19 @ 10:04) >         - Multiple non-bleeding colonic angioectasias. Treated                    with argon beam coagulation.                       - Rectal varices.                       - Internal hemorrhoids.                       - No specimens collected.    < end of copied text >        RADIOLOGY:    < from: US Abdomen Limited (10.28.19 @ 18:05) >  Liver: Redemonstration of cirrhotic morphology.    Bile ducts: Normal caliber. Common bile duct measures 6 mm.     Gallbladder: Poorly evaluated . Negative Garduno's sign.    Pancreas: Not visualized.    Right kidney: 8.6 cm. No hydronephrosis.    Ascites: None.    IVC: Visualized portions are within normal limits.    IMPRESSION:     Redemonstration of cirrhotic morphology of the liver.    The gallbladder is poorly evaluated on ultrasound, may be related to   known porcelain gallbladder.         < end of copied text >    < from: CT Abdomen and Pelvis w/ Oral Cont and w/ IV Cont (10.28.19 @ 14:20) >    LIVER: Cirrhotic configuration of the liver again noted.  BILE DUCTS: Mild dilatation of the common bile duct to 0.9 cm. A 0.5 cm   calculus in the distal common bile duct at the level of the pancreatic   head is unchanged since 5/22/2019.  GALLBLADDER: Cholelithiasis and calcified gallbladder wall are again   noted.  SPLEEN: Within normal limits.  PANCREAS: Minimal infiltration of the fat adjacent to the pancreatic head   and proximalduodenum.  ADRENALS: Within normal limits.  KIDNEYS/URETERS: No hydronephrosis. Lobulated contour of the kidneys   bilaterally. Kidneys are mildly atrophic bilaterally.    BLADDER: Within normal limits.  REPRODUCTIVE ORGANS: Prostate gland is within normal limits.    BOWEL: No bowel obstruction. Appendix is within normal limits.  PERITONEUM: No ascites.  VESSELS: Atherosclerotic calcification of the aorta. Portal vein is   patent.  RETROPERITONEUM/LYMPH NODES: No lymphadenopathy.    ABDOMINAL WALL: A 3.6 x 2.4 cm complex mass involving the right rectus   muscle is unchanged. Fat-containing umbilical hernia. Again noted, is a   3.5 cm low-attenuation structure in the right lower quadrant, which may   be related to prior inguinal hernia repair and is unchanged since   5/25/2018.  BONES: Degenerative changes in the spine.    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    Choledocholithiasis, with mild dilatation of the common bile duct.     Cirrhotic configuration of the liver.    Minimal infiltration of the fat adjacent to the pancreatic head and   proximal duodenum, raising consideration of mild pancreatitis and/or   duodenitis..    < end of copied text >    < from: MR Abdomen w/wo IV Cont (09.13.18 @ 12:29) >  LIVER: Cirrhosis. Previously noted arterial enhancing foci within the   liver at prior imaging June 2018 are not identified on the current study.   It should be noted that arterial phase imaging is slightly limited.   Despite this limitation there is no evidence of abnormality at portal   venous or delayed phase imaging. No T1 or T2 signal abnormality in the   regions of interest.          BILE DUCTS: Normal caliber.  GALLBLADDER: Cholelithiasis.  SPLEEN: Within normal limits.  PANCREAS: Within normal limits.  ADRENALS: Within normal limits.  KIDNEYS/URETERS: Symmetric enhancement. No hydronephrosis or renal mass.   Previously noted abnormality in the lower pole of the right kidney has   resolved and may have been related to focal inflammation or infection.    VISUALIZED PORTIONS:    BOWEL: Within normal limits.   PERITONEUM:No ascites.  VESSELS: Hepatic and portal veins are patent.  RETROPERITONEUM: No lymphadenopathy.    ABDOMINAL WALL: A 3.9 cm lesion within the right anterior abdominal wall   without significant change from prior imaging dating to May 2018. Mild   peripheral T1 signal is noted without enhancement. Findings may be   related to a chronic hematoma. Fat-containing umbilical hernia.  BONES: Within normal limits.    IMPRESSION:     Cirrhosis. No evidence of hepatocellular cancer.    Interval resolutionof a previously noted abnormality within the lower   pole of the right kidney.          < end of copied text > GI HPI Today:  Patient is a 79y old  Male who presents with a chief complaint of Abdominal Pain (31 Oct 2019 07:14)  Interval Events:   Pt took bowel prep last night , yellow coloured liquid BM   Last bloody BM (Maroon coloured) was yesterday in am   No blood transfusions overnight   Abd pain improved, no nausea, no vomiting , no fever.     PAST MEDICAL & SURGICAL HISTORY  Liver cirrhosis  Guillain-Houston syndrome  HTN (hypertension)  H/O inguinal hernia repair      ALLERGIES:  Allergy Status Unknown  No Known Allergies      MEDICATIONS:  MEDICATIONS  (STANDING):  influenza   Vaccine 0.5 milliLiter(s) IntraMuscular once  lactated ringers. 1000 milliLiter(s) (100 mL/Hr) IV Continuous <Continuous>  lactulose Syrup 10 Gram(s) Oral two times a day  octreotide  Infusion 50 MICROgram(s)/Hr (10 mL/Hr) IV Continuous <Continuous>  pantoprazole  Injectable 40 milliGRAM(s) IV Push two times a day  piperacillin/tazobactam IVPB.. 3.375 Gram(s) IV Intermittent every 8 hours  propranolol 10 milliGRAM(s) Oral two times a day    MEDICATIONS  (PRN):  acetaminophen   Tablet .. 650 milliGRAM(s) Oral every 6 hours PRN Mild Pain (1 - 3), Moderate Pain (4 - 6)      REVIEW OF SYSTEMS  General:  No fevers  Eyes:  No reported pain   ENT:  No sore throat   NECK: No stiffness   CV:  No chest pain   Resp:  No shortness of breath  GI:  See HPI  :  No dysuria  Muscle:  No weakness  Neuro:  No tingling  Endocrine:  No polyuria  Heme:  No ecchymosis        VITALS:   T(F): 97.6 (10-31 @ 05:09), Max: 100 (10-28 @ 11:47)  HR: 50 (10-31 @ 05:09) (50 - 130)  BP: 129/72 (10-31 @ 05:09) (100/56 - 170/90)  BP(mean): --  RR: 18 (10-31 @ 05:09) (15 - 20)  SpO2: 100% (10-31 @ 05:09) (98% - 100%)        PHYSICAL EXAM:  Gen: comfortable   EYES: No scleral icterus   LUNG: Clear to auscultation bilaterally;   HEART: s1 and s2 heard   ABDOMEN: Soft, +BS, no abd distension, no Abdominal Tenderness, No guarding, No Garduno Sign   Neuro: AAO x3 , no asterix   Rectal exam: finger stained yellow liquid, no evidence of blood , no palpable masses      Blood Work :                        13.1   7.81  )-----------( 219      ( 31 Oct 2019 05:15 )             43.4     PT/INR - ( 31 Oct 2019 05:18 )  INR: 1.52          PTT - ( 31 Oct 2019 05:18 )  PTT:28.7   10-31    139  |  102  |  35<H>  ----------------------------<  111<H>  4.0   |  23  |  1.77<H>    Ca    9.2      31 Oct 2019 05:15  Phos  2.3     10-31  Mg     1.9     10-31      CBC -  ( 31 Oct 2019 05:15 )  Hemoglobin : 13.1    CBC -  ( 30 Oct 2019 23:20 )  Hemoglobin : 12.9    CBC -  ( 30 Oct 2019 14:20 )  Hemoglobin : 12.6    CBC -  ( 30 Oct 2019 06:30 )  Hemoglobin : 13.8    CBC -  ( 29 Oct 2019 22:20 )  Hemoglobin : 13.7    CBC -  ( 29 Oct 2019 15:25 )  Hemoglobin : 14.2    CBC -  ( 29 Oct 2019 06:50 )  Hemoglobin : 14.7    CBC -  ( 28 Oct 2019 11:42 )  Hemoglobin : 15.6      LIVER FUNCTIONS - ( 31 Oct 2019 05:15 )  Alb: 3.2 [3.3 - 5.0] / Pro: 6.9 [6.0 - 8.3] / ALK PHOS: 104 [40 - 120] / ALT: 31 [4 - 41] / AST: 47 [4 - 40] / GGT: x     LIVER FUNCTIONS - ( 30 Oct 2019 06:30 )  Alb: 3.3 [3.3 - 5.0] / Pro: 7.2 [6.0 - 8.3] / ALK PHOS: 105 [40 - 120] / ALT: 23 [4 - 41] / AST: 32 [4 - 40] / GGT: x     LIVER FUNCTIONS - ( 29 Oct 2019 06:50 )  Alb: 3.4 [3.3 - 5.0] / Pro: 7.3 [6.0 - 8.3] / ALK PHOS: 119 [40 - 120] / ALT: 21 [4 - 41] / AST: 28 [4 - 40] / GGT: x     LIVER FUNCTIONS - ( 28 Oct 2019 11:42 )  Alb: 3.6 [3.3 - 5.0] / Pro: 8.2 [6.0 - 8.3] / ALK PHOS: 147 [40 - 120] / ALT: 27 [4 - 41] / AST: 43 [4 - 40] / GGT: x       Previous EGD:   < from: Upper Endoscopy (09.11.19 @ 10:04) >      - Small (< 5 mm) esophageal varices.                       - Normal stomach. Biopsied.                       - Granular, erythematous mucosa in the first part of the                        duodenum. Biopsied.                       - The examination was otherwise normal.    < end of copied text >    Path - + H.pylori     Previous colonoscopy:     < from: Colonoscopy (09.11.19 @ 10:04) >         - Multiple non-bleeding colonic angioectasias. Treated                    with argon beam coagulation.                       - Rectal varices.                       - Internal hemorrhoids.                       - No specimens collected.    < end of copied text >        RADIOLOGY:    < from: US Abdomen Limited (10.28.19 @ 18:05) >  Liver: Redemonstration of cirrhotic morphology.    Bile ducts: Normal caliber. Common bile duct measures 6 mm.     Gallbladder: Poorly evaluated . Negative Garduno's sign.    Pancreas: Not visualized.    Right kidney: 8.6 cm. No hydronephrosis.    Ascites: None.    IVC: Visualized portions are within normal limits.    IMPRESSION:     Redemonstration of cirrhotic morphology of the liver.    The gallbladder is poorly evaluated on ultrasound, may be related to   known porcelain gallbladder.         < end of copied text >    < from: CT Abdomen and Pelvis w/ Oral Cont and w/ IV Cont (10.28.19 @ 14:20) >    LIVER: Cirrhotic configuration of the liver again noted.  BILE DUCTS: Mild dilatation of the common bile duct to 0.9 cm. A 0.5 cm   calculus in the distal common bile duct at the level of the pancreatic   head is unchanged since 5/22/2019.  GALLBLADDER: Cholelithiasis and calcified gallbladder wall are again   noted.  SPLEEN: Within normal limits.  PANCREAS: Minimal infiltration of the fat adjacent to the pancreatic head   and proximalduodenum.  ADRENALS: Within normal limits.  KIDNEYS/URETERS: No hydronephrosis. Lobulated contour of the kidneys   bilaterally. Kidneys are mildly atrophic bilaterally.    BLADDER: Within normal limits.  REPRODUCTIVE ORGANS: Prostate gland is within normal limits.    BOWEL: No bowel obstruction. Appendix is within normal limits.  PERITONEUM: No ascites.  VESSELS: Atherosclerotic calcification of the aorta. Portal vein is   patent.  RETROPERITONEUM/LYMPH NODES: No lymphadenopathy.    ABDOMINAL WALL: A 3.6 x 2.4 cm complex mass involving the right rectus   muscle is unchanged. Fat-containing umbilical hernia. Again noted, is a   3.5 cm low-attenuation structure in the right lower quadrant, which may   be related to prior inguinal hernia repair and is unchanged since   5/25/2018.  BONES: Degenerative changes in the spine.    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    Choledocholithiasis, with mild dilatation of the common bile duct.     Cirrhotic configuration of the liver.    Minimal infiltration of the fat adjacent to the pancreatic head and   proximal duodenum, raising consideration of mild pancreatitis and/or   duodenitis..    < end of copied text >    < from: MR Abdomen w/wo IV Cont (09.13.18 @ 12:29) >  LIVER: Cirrhosis. Previously noted arterial enhancing foci within the   liver at prior imaging June 2018 are not identified on the current study.   It should be noted that arterial phase imaging is slightly limited.   Despite this limitation there is no evidence of abnormality at portal   venous or delayed phase imaging. No T1 or T2 signal abnormality in the   regions of interest.          BILE DUCTS: Normal caliber.  GALLBLADDER: Cholelithiasis.  SPLEEN: Within normal limits.  PANCREAS: Within normal limits.  ADRENALS: Within normal limits.  KIDNEYS/URETERS: Symmetric enhancement. No hydronephrosis or renal mass.   Previously noted abnormality in the lower pole of the right kidney has   resolved and may have been related to focal inflammation or infection.    VISUALIZED PORTIONS:    BOWEL: Within normal limits.   PERITONEUM:No ascites.  VESSELS: Hepatic and portal veins are patent.  RETROPERITONEUM: No lymphadenopathy.    ABDOMINAL WALL: A 3.9 cm lesion within the right anterior abdominal wall   without significant change from prior imaging dating to May 2018. Mild   peripheral T1 signal is noted without enhancement. Findings may be   related to a chronic hematoma. Fat-containing umbilical hernia.  BONES: Within normal limits.    IMPRESSION:     Cirrhosis. No evidence of hepatocellular cancer.    Interval resolutionof a previously noted abnormality within the lower   pole of the right kidney.          < end of copied text >

## 2019-10-31 NOTE — PROGRESS NOTE ADULT - PROBLEM SELECTOR PLAN 7
Cr baseline ~ 1.2-1.5  - stable, within baseline Cr baseline ~ 1.2-1.5  - stable, within baseline, Cr rising likely from dehydration and melena, IVF were given on 10/30. Will continue to trend

## 2019-10-31 NOTE — PROGRESS NOTE ADULT - PROBLEM SELECTOR PLAN 1
possible variceal (less likely) vs PUD vs duodenal ulcer, known duodenitis and H. pylori from Sept 2019 surgical pathology that hasn't been treated  - given protonix 80 IVP x1, continue protonix 40mg IV BID  - Hgb slowly downtrending  - keep T/S active, transfuse for Hgb <8 or with symptoms or continued bleeding  - Hepatology consulted, recs appreciated  - GI consulted for possible colonoscopy +/- EGD  - NPO pending hepatology/GI rec possible variceal (less likely) vs PUD vs duodenal ulcer, known duodenitis and H. pylori from Sept 2019 surgical pathology that hasn't been treated  - given protonix 80 IVP x1, continue protonix 40mg IV BID  - H/H stable   - keep T/S active, transfuse for Hgb <8 or with symptoms or continued bleeding  - Hepatology consulted, recs appreciated  - GI consulted, plan for possible colonoscopy +/- EGD today; currently NPO

## 2019-10-31 NOTE — PROGRESS NOTE ADULT - PROBLEM SELECTOR PLAN 9
Decompensated ETOH Cirrhosis, previously on transplant list but no longer  MELD-Na 20. 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 No ascities on CT. 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  Liver lesion on MR concerning for HCC, repeat MR inconclusive.  - Mental status at baseline per wife, currently AOx3 (but states he has been getting forgetful with age)   - Trend MELD labs  - Propranolol 20 mg bid  - decrease lactulose to 10 G BID, per hepatology  - ammonia wnl

## 2019-10-31 NOTE — MEDICAL STUDENT PROGRESS NOTE(EDUCATION) - NS MD HP STUD ASPLAN PLAN FT
#GI Bleed: Pt is hemodynamically stable and pt denies blood in stool overnight. Source of bleed possibly due to PUD 2/2 H. pylori infection vs esophageal varices.  -Scheduled for colonoscopy today. Drank prep last night.  -C/w propanolol 10 mg BID, pantoprazole IV 40 mg BID, octreotide  -c/w antibiotic prophylaxis: Zosyn day 3  -Plan to begin triple therapy for H. pylori infection following resolution of GI bleed    #Abdominal pain: Etiology of abdominal pain is unclear. Pain is resolving since hospital admission. Peptic ulcer 2/2 duodenal inflammation from H. pylori infection is possible. Recent history significant for untreated positive H. pylori culture on recent endoscopy.  -Continue tylenol max 2g daily for pain control  -Defer ERCP until GI bleed assessed. Pt is not a surgical candidate. May follow-up outpatient.    #SIRS: Leukocytosis,  on admission with downtrending lactate 4.4>3.5>2.6. Etiology of infection likely urinary tract infection. Leukocytosis has resolved. VS stable and WNL.  -Day 3 of Zosyn. Continue with course  - cc/hr  -Urine culture positive for E. coli  -Blood culture no grown organisms at 48 hours.    #Sore throat and cough: New presentation of sore throat and cough upon exam this morning. VS stable.  -Monitor VS. Patient is stable.      #CKD: Stage 3 CKD per outpatient chart. Basline Cr 1.2-1.5. Current SCr 1.66 likely due to NPO and dehydration.  -Stable. Monitor BMP qd  -Avoid possible nephrotoxic medications.  -Reassess SCr following colonoscopy and PO intake.    #Liver cirrhosis: H/o decompensated alcoholic cirrhosis with esophageal varices (May 2018) and encephalopathy on lactulose.  -Decrease lactulose to 10 g BID per Hepatology recs  -Monitor mental status  -Trend MELD-Na labs- MELD-Na 18 today  -Continue with propranolol 10 mg BID #GI Bleed: Pt is hemodynamically stable and pt denies blood in stool overnight. Source of bleed possibly due to PUD 2/2 H. pylori infection vs esophageal varices.  -Scheduled for colonoscopy today. Drank prep last night.  -C/w propanolol 10 mg BID, pantoprazole IV 40 mg BID, octreotide  -c/w antibiotic prophylaxis: Zosyn day 3  -Plan to begin triple therapy for H. pylori infection following resolution of GI bleed    #Abdominal pain: Etiology of abdominal pain is unclear. Pain is resolving since hospital admission. Peptic ulcer 2/2 duodenal inflammation from H. pylori infection is possible. Recent history significant for untreated positive H. pylori culture on recent endoscopy. Pt has a history of chronic choledocholithiasis no symptoms of cholangitis  -Continue tylenol max 2g daily for pain control  -Defer ERCP until GI bleed assessed. Pt is not a surgical candidate. May follow-up outpatient.    #SIRS: Leukocytosis,  on admission with downtrending lactate 4.4>3.5>2.6. Etiology of infection likely urinary tract infection. Leukocytosis has resolved. VS stable and WNL.  -Day 3 of Zosyn. Continue with course  - cc/hr  -Urine culture positive for E. coli  -Blood culture no grown organisms at 48 hours.    #Sore throat and cough: New presentation of sore throat and cough upon exam this morning. VS stable. Like due to viral infection.  -Monitor VS. Patient is stable.      #CKD: Stage 3 CKD per outpatient chart. Basline Cr 1.2-1.5. Current SCr 1.66 likely due to NPO and dehydration.  -Stable. Monitor BMP qd  -Avoid possible nephrotoxic medications.  -Reassess SCr following colonoscopy and PO intake.    #Liver cirrhosis: H/o decompensated alcoholic cirrhosis with esophageal varices (May 2018) and encephalopathy on lactulose.  -Decrease lactulose to 10 g BID per Hepatology recs  -Monitor mental status  -Trend MELD-Na labs- MELD-Na 18 today  -Continue with propranolol 10 mg BID

## 2019-10-31 NOTE — PROGRESS NOTE ADULT - ASSESSMENT
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  Admitted with SIRS possibly 2/2 choledocholithiasis vs. 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  Admitted with SIRS possibly 2/2 PUD (H. pylori+ on previous EGD never started on triple therapy), vs choledocholithiasis vs. 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  Admitted with SIRS possibly 2/2 PUD (H. pylori+ on previous EGD never started on triple therapy), vs choledocholithiasis vs. pancreatitis and acute blood loss anemia likely from PUD vs varices.  Course c/b E. coli bacteruria

## 2019-10-31 NOTE — PROGRESS NOTE ADULT - ASSESSMENT
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 Hospital course complicated by rectal bleeding     IMPRESSION  #) Hematochezia , HD stable, Hb stable at 13   No overt gi bleed overnight      * Stable lower GI bleed   Possible sec to hemorrhoidal vs AVM seen on prior colon in rectum and cecum vs unlikely sec to rectal varices vs r/o Ischemic colitis given abd pain and elevated LA   Rec   Pt scheduled for colonoscopy today , follow up with colonoscopy report   for now can c/w Iv octeriotide and IV ABX     #)  Diffuse Abdominal pain - Improving   possible sec to PUD- duodenitis/ mild pancreatitis / choledocholithiasis / r/o Ischemic colitis   h/o H.pylori gastritis, duodenitis - s/p treatment   Rec:  Protonix 40 mg daily     #) Choledocholithiasis without elevated liver enzymes (Tbili 0.6, AST 47, ALT 31,  Alk phos 104), CT with CBD dilation to 0.9cm and choledocholithiasis, also seen on a previous CT scan  no obstruction, no cholangitis  Rec   Elective ERCP once pt GI bleeding resolved     #) Porcelain gallbladder with gallstones    given liver cirrhosis, 30d mortality was estimated to be >35% at 90 days recently, and it was discussed with the patient not to undergo cholecystectomy.        #) Decompensated alcohol Liver Cirrhosis , MELD Na - 18  Last drink of alcohol - 18 months ago     EV bleed in 2018, banded, small varices on EGD 9/2019, currently on Propanolol 10 BID, Current HR -68     No ascites , No splenomegaly, No Thrombocytopenia     remote h/o HE, currently AAO x3, no asterixis     coagulopathy , INR - 1.54    Recent CT abd with IV con - no liver mass   Rec   C/w Propanolol 10 BID   abstinent from alcohol   given remote h/o HE , currently on bowel prep for colonoscopy , hold lactulose for now 79 year old man from home, PMH of [facility] (1996, hospitalized for 4 months), decompensated alcoholic cirrhosis (varices,  no ascites per CT; 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 Hospital course complicated by rectal bleeding     IMPRESSION  #) Hematochezia , HD stable, Hb stable at 13   No overt gi bleed overnight      * Stable lower GI bleed   Possible sec to hemorrhoidal vs AVM seen on prior colon in rectum and cecum vs unlikely sec to rectal varices vs r/o Ischemic colitis given abd pain and elevated LA   Rec   Pt scheduled for colonoscopy today , follow up with colonoscopy report   for now can c/w Iv octeriotide and IV ABX     #)  Diffuse Abdominal pain - Improving   possible sec to PUD- duodenitis/ mild pancreatitis / choledocholithiasis / r/o Ischemic colitis   h/o H.pylori gastritis, duodenitis - s/p treatment   Rec:  Protonix 40 mg daily     #) Choledocholithiasis without elevated liver enzymes (Tbili 0.6, AST 47, ALT 31,  Alk phos 104), CT with CBD dilation to 0.9cm and choledocholithiasis, also seen on a previous CT scan  no obstruction, no cholangitis  Rec   Elective ERCP once pt GI bleeding resolved     #) Porcelain gallbladder with gallstones    given liver cirrhosis, 30d mortality was estimated to be >35% at 90 days recently, and it was discussed with the patient not to undergo cholecystectomy.        #) Decompensated alcohol Liver Cirrhosis , MELD Na - 17  Last drink of alcohol - 18 months ago     EV bleed in 2018, banded, small varices on EGD 9/2019, currently on Propanolol 10 BID, Current HR -68     No ascites , No splenomegaly, No Thrombocytopenia     remote h/o HE, currently AAO x3, no asterixis     coagulopathy , INR - 1.52    Recent CT abd with IV con - no liver mass   Rec   C/w Propanolol 10 BID   abstinent from alcohol   given remote h/o HE , currently on bowel prep for colonoscopy , hold lactulose for now

## 2019-10-31 NOTE — PROGRESS NOTE ADULT - PROBLEM SELECTOR PLAN 2
Colicky upper abd pain +/- associated w/ food  - ddx includes choledocholithiasis vs pancreatitis, duodenitis, mesenteric ischemia  - CT A/P shows cholelithiasis, choledocholithiasis w/ CBD dilitation; neg pancreatitis    - consider mesenteric duplex given hx of SMA stenosis and lactate   - per GI, can defer ERCP until GI bleed is addressed Colicky upper abd pain +/- associated w/ food, but pt is not the best historian  - ddx includes choledocholithiasis vs PUD, pancreatitis, duodenitis, mesenteric ischemia  - CT A/P shows cholelithiasis, choledocholithiasis w/ CBD dilitation; neg pancreatitis    - given hematochezia this admission, c/f bleeding PUD; plan for colonoscopy +/- EGD today  - consider mesenteric duplex given hx of SMA stenosis and lactate   - per GI, can defer ERCP until GI bleed is addressed Colicky upper abd pain +/- associated w/ food  - ddx includes choledocholithiasis vs PUD, pancreatitis, duodenitis, mesenteric ischemia  - CT A/P shows cholelithiasis, choledocholithiasis w/ CBD dilitation; neg pancreatitis    - given hematochezia this admission, c/f bleeding PUD; plan for colonoscopy +/- EGD today  - consider mesenteric duplex given hx of SMA stenosis and lactate   - per GI, can defer ERCP until GI bleed is addressed  - will consider triple therapy for H. pylori

## 2019-10-31 NOTE — PROGRESS NOTE ADULT - PROBLEM SELECTOR PLAN 3
patient had sepsis on admission with Leukocytosis and tachycardia to 130 on admission likely from UTI and possible GI translocation in setting of new GIB.  Lactate 4.4 now downtrending s/p IVF.  +UA. Lungs clear on CXR, blood and urine cultures pending.  Pt remains afebrile.   - starting empiric abx with zosyn    - IVF at 100cc/hr  - lactate downtrending patient had sepsis on admission with Leukocytosis and tachycardia to 130 on admission likely from UTI and possible GI translocation in setting of new GIB.  Lactate 4.4 now downtrending s/p IVF.  +UA. Lungs clear on CXR, blood and urine cultures pending.  Pt remains afebrile.   - UCx grew E. coli resistant to Levoquin, but sensitive to other tested abx  - c/w zosyn    - IVF at 100cc/hr  - lactate downtrending

## 2019-11-01 LAB
ALBUMIN SERPL ELPH-MCNC: 3.1 G/DL — LOW (ref 3.3–5)
ALP SERPL-CCNC: 92 U/L — SIGNIFICANT CHANGE UP (ref 40–120)
ALT FLD-CCNC: 26 U/L — SIGNIFICANT CHANGE UP (ref 4–41)
ANION GAP SERPL CALC-SCNC: 12 MMO/L — SIGNIFICANT CHANGE UP (ref 7–14)
APPEARANCE UR: CLEAR — SIGNIFICANT CHANGE UP
AST SERPL-CCNC: 32 U/L — SIGNIFICANT CHANGE UP (ref 4–40)
BACTERIA # UR AUTO: NEGATIVE — SIGNIFICANT CHANGE UP
BILIRUB SERPL-MCNC: 0.5 MG/DL — SIGNIFICANT CHANGE UP (ref 0.2–1.2)
BILIRUB UR-MCNC: NEGATIVE — SIGNIFICANT CHANGE UP
BLOOD UR QL VISUAL: NEGATIVE — SIGNIFICANT CHANGE UP
BUN SERPL-MCNC: 25 MG/DL — HIGH (ref 7–23)
CALCIUM SERPL-MCNC: 8.6 MG/DL — SIGNIFICANT CHANGE UP (ref 8.4–10.5)
CHLORIDE SERPL-SCNC: 103 MMOL/L — SIGNIFICANT CHANGE UP (ref 98–107)
CO2 SERPL-SCNC: 20 MMOL/L — LOW (ref 22–31)
COLOR SPEC: SIGNIFICANT CHANGE UP
CREAT SERPL-MCNC: 1.62 MG/DL — HIGH (ref 0.5–1.3)
GLUCOSE SERPL-MCNC: 129 MG/DL — HIGH (ref 70–99)
GLUCOSE UR-MCNC: NEGATIVE — SIGNIFICANT CHANGE UP
HCT VFR BLD CALC: 42.3 % — SIGNIFICANT CHANGE UP (ref 39–50)
HGB BLD-MCNC: 12.9 G/DL — LOW (ref 13–17)
HYALINE CASTS # UR AUTO: NEGATIVE — SIGNIFICANT CHANGE UP
KETONES UR-MCNC: NEGATIVE — SIGNIFICANT CHANGE UP
LEUKOCYTE ESTERASE UR-ACNC: SIGNIFICANT CHANGE UP
MAGNESIUM SERPL-MCNC: 1.7 MG/DL — SIGNIFICANT CHANGE UP (ref 1.6–2.6)
MCHC RBC-ENTMCNC: 25 PG — LOW (ref 27–34)
MCHC RBC-ENTMCNC: 30.5 % — LOW (ref 32–36)
MCV RBC AUTO: 82 FL — SIGNIFICANT CHANGE UP (ref 80–100)
NITRITE UR-MCNC: NEGATIVE — SIGNIFICANT CHANGE UP
NRBC # FLD: 0 K/UL — SIGNIFICANT CHANGE UP (ref 0–0)
PH UR: 6 — SIGNIFICANT CHANGE UP (ref 5–8)
PHOSPHATE SERPL-MCNC: 3 MG/DL — SIGNIFICANT CHANGE UP (ref 2.5–4.5)
PLATELET # BLD AUTO: 229 K/UL — SIGNIFICANT CHANGE UP (ref 150–400)
PMV BLD: 10.4 FL — SIGNIFICANT CHANGE UP (ref 7–13)
POTASSIUM SERPL-MCNC: 3.8 MMOL/L — SIGNIFICANT CHANGE UP (ref 3.5–5.3)
POTASSIUM SERPL-SCNC: 3.8 MMOL/L — SIGNIFICANT CHANGE UP (ref 3.5–5.3)
PROT SERPL-MCNC: 6.5 G/DL — SIGNIFICANT CHANGE UP (ref 6–8.3)
PROT UR-MCNC: NEGATIVE — SIGNIFICANT CHANGE UP
RBC # BLD: 5.16 M/UL — SIGNIFICANT CHANGE UP (ref 4.2–5.8)
RBC # FLD: 17 % — HIGH (ref 10.3–14.5)
RBC CASTS # UR COMP ASSIST: SIGNIFICANT CHANGE UP (ref 0–?)
SODIUM SERPL-SCNC: 135 MMOL/L — SIGNIFICANT CHANGE UP (ref 135–145)
SP GR SPEC: 1.01 — SIGNIFICANT CHANGE UP (ref 1–1.04)
SQUAMOUS # UR AUTO: SIGNIFICANT CHANGE UP
UROBILINOGEN FLD QL: NORMAL — SIGNIFICANT CHANGE UP
WBC # BLD: 7.69 K/UL — SIGNIFICANT CHANGE UP (ref 3.8–10.5)
WBC # FLD AUTO: 7.69 K/UL — SIGNIFICANT CHANGE UP (ref 3.8–10.5)
WBC UR QL: HIGH (ref 0–?)

## 2019-11-01 PROCEDURE — 99232 SBSQ HOSP IP/OBS MODERATE 35: CPT | Mod: GC

## 2019-11-01 RX ORDER — PANTOPRAZOLE SODIUM 20 MG/1
40 TABLET, DELAYED RELEASE ORAL
Refills: 0 | Status: DISCONTINUED | OUTPATIENT
Start: 2019-11-01 | End: 2019-11-08

## 2019-11-01 RX ORDER — LACTULOSE 10 G/15ML
10 SOLUTION ORAL
Refills: 0 | Status: DISCONTINUED | OUTPATIENT
Start: 2019-11-01 | End: 2019-11-02

## 2019-11-01 RX ORDER — AMOXICILLIN 250 MG/5ML
1000 SUSPENSION, RECONSTITUTED, ORAL (ML) ORAL
Refills: 0 | Status: DISCONTINUED | OUTPATIENT
Start: 2019-11-01 | End: 2019-11-08

## 2019-11-01 RX ORDER — CLARITHROMYCIN 500 MG
500 TABLET ORAL
Refills: 0 | Status: DISCONTINUED | OUTPATIENT
Start: 2019-11-01 | End: 2019-11-08

## 2019-11-01 RX ADMIN — LACTULOSE 10 GRAM(S): 10 SOLUTION ORAL at 17:52

## 2019-11-01 RX ADMIN — PANTOPRAZOLE SODIUM 40 MILLIGRAM(S): 20 TABLET, DELAYED RELEASE ORAL at 05:21

## 2019-11-01 RX ADMIN — PANTOPRAZOLE SODIUM 40 MILLIGRAM(S): 20 TABLET, DELAYED RELEASE ORAL at 17:52

## 2019-11-01 RX ADMIN — PIPERACILLIN AND TAZOBACTAM 25 GRAM(S): 4; .5 INJECTION, POWDER, LYOPHILIZED, FOR SOLUTION INTRAVENOUS at 00:35

## 2019-11-01 RX ADMIN — OCTREOTIDE ACETATE 10 MICROGRAM(S)/HR: 200 INJECTION, SOLUTION INTRAVENOUS; SUBCUTANEOUS at 13:06

## 2019-11-01 RX ADMIN — Medication 10 MILLIGRAM(S): at 17:52

## 2019-11-01 RX ADMIN — Medication 1000 MILLIGRAM(S): at 17:51

## 2019-11-01 RX ADMIN — Medication 500 MILLIGRAM(S): at 18:32

## 2019-11-01 NOTE — PROGRESS NOTE ADULT - PROBLEM SELECTOR PLAN 10
DVT: pharm ppx on hold in setting of melena  Diet: clears at present; regulard diet pending GI recs DVT: pharm ppx on hold in setting of melena  Diet: clears at present; regular diet

## 2019-11-01 NOTE — MEDICAL STUDENT PROGRESS NOTE(EDUCATION) - SUBJECTIVE AND OBJECTIVE BOX
KETAN TIPTON  79y  Male      Patient is a 79y old  Male who presents with a chief complaint of Abdominal Pain (31 Oct 2019 08:50)      INTERVAL HPI/OVERNIGHT EVENTS: No acute events overnight. Patient interviewed and examined at bedside. Pt reports feeling well. States he still has abdominal pain when pressing on his stomach and cough +/- productive of clear/white sputum. Denies nausea, vomiting, diarrhea, constipation, blood in stool, black stools, SOB.       REVIEW OF SYSTEMS:  CONSTITUTIONAL: No fever or fatigue  ENMT:  +throat pain. No runny nose, sinus pain.  RESPIRATORY: + cough. No wheezing, chills or hemoptysis; No shortness of breath  CARDIOVASCULAR: No chest pain, palpitations, dizziness, or leg swelling  GASTROINTESTINAL: + mild abdominal pain. No nausea, vomiting, or hematemesis; No diarrhea or constipation. No melena or hematochezia.  GENITOURINARY: No dysuria, frequency, hematuria, or incontinence    HOSPITAL MEDICATIONS:  acetaminophen   Tablet .. 650 milliGRAM(s) Oral every 6 hours PRN  influenza   Vaccine 0.5 milliLiter(s) IntraMuscular once  lactated ringers. 1000 milliLiter(s) IV Continuous <Continuous>  octreotide  Infusion 50 MICROgram(s)/Hr IV Continuous <Continuous>  pantoprazole  Injectable 40 milliGRAM(s) IV Push two times a day  propranolol 10 milliGRAM(s) Oral two times a day    Vital Signs Last 24 Hrs  T(C): 36.3 (2019 05:24), Max: 36.6 (31 Oct 2019 12:43)  T(F): 97.4 (2019 05:24), Max: 97.9 (31 Oct 2019 21:57)  HR: 51 (2019 05:24) (51 - 92)  BP: 126/61 (2019 05:24) (102/64 - 147/82)  BP(mean): --  RR: 18 (2019 05:24) (17 - 18)  SpO2: 100% (2019 05:24) (97% - 100%)      PHYSICAL EXAM:  GENERAL: Pleasant older gentleman in NAD.   EYES: sclera clear  ENMT: No tonsillar erythema, exudates, or enlargement; Moist mucous membranes.  NECK: No JVD.  NERVOUS SYSTEM:  Alert & Oriented X3  CHEST/LUNG: Clear to auscultation bilaterally; No rales, rhonchi, wheezing, or rubs  HEART: Bradycardic, Regular rhythm; No murmurs, rubs, or gallops  ABDOMEN: Soft, TTP in RUQ, Nondistended; Bowel sounds normoactive  EXTREMITIES:  2+ Peripheral Pulses, No clubbing, cyanosis, or edema  SKIN: No rashes or lesions    LABS:                        12.9   7.69  )-----------( 229      ( 2019 05:50 )             42.3         135  |  103  |  25<H>  ----------------------------<  129<H>  3.8   |  20<L>  |  1.62<H>    Ca    8.6      2019 05:50  Phos  3.0       Mg     1.7         TPro  6.5  /  Alb  3.1<L>  /  TBili  0.5  /  DBili  x   /  AST  32  /  ALT  26  /  AlkPhos  92      PT/INR - ( 31 Oct 2019 05:18 )   PT: 17.6 SEC;   INR: 1.52          PTT - ( 31 Oct 2019 05:18 )  PTT:28.7 SEC    Urinalysis Basic - ( 2019 06:15 )    Color: LT. YELLOW / Appearance: CLEAR / S.009 / pH: 6.0  Gluc: NEGATIVE / Ketone: NEGATIVE  / Bili: NEGATIVE / Urobili: NORMAL   Blood: NEGATIVE / Protein: NEGATIVE / Nitrite: NEGATIVE   Leuk Esterase: SMALL / RBC: 0-2 / WBC 6-10   Sq Epi: OCC / Non Sq Epi: x / Bacteria: NEGATIVE      79y  Male    RADIOLOGY & ADDITIONAL TESTS: KETAN TIPTON  79y  Male      Patient is a 79y old  Male who presents with a chief complaint of Abdominal Pain (31 Oct 2019 08:50)      INTERVAL HPI/OVERNIGHT EVENTS: No acute events overnight. Patient interviewed and examined at bedside. Pt reports feeling well. States he still has abdominal pain when pressing on his stomach and cough +/- productive of clear/white sputum. Denies nausea, vomiting, diarrhea, constipation, blood in stool, black stools, SOB.       REVIEW OF SYSTEMS:  CONSTITUTIONAL: No fever or fatigue  ENMT:  +throat pain. No runny nose, sinus pain.  RESPIRATORY: + cough. No wheezing, chills or hemoptysis; No shortness of breath  CARDIOVASCULAR: No chest pain, palpitations, dizziness, or leg swelling  GASTROINTESTINAL: + mild abdominal pain. No nausea, vomiting, or hematemesis; No diarrhea or constipation. No melena or hematochezia.  GENITOURINARY: No dysuria, frequency, hematuria, or incontinence    HOSPITAL MEDICATIONS:  acetaminophen   Tablet .. 650 milliGRAM(s) Oral every 6 hours PRN  influenza   Vaccine 0.5 milliLiter(s) IntraMuscular once  lactated ringers. 1000 milliLiter(s) IV Continuous <Continuous>  octreotide  Infusion 50 MICROgram(s)/Hr IV Continuous <Continuous>  pantoprazole  Injectable 40 milliGRAM(s) IV Push two times a day  propranolol 10 milliGRAM(s) Oral two times a day    Vital Signs Last 24 Hrs  T(C): 36.3 (2019 05:24), Max: 36.6 (31 Oct 2019 12:43)  T(F): 97.4 (2019 05:24), Max: 97.9 (31 Oct 2019 21:57)  HR: 51 (2019 05:24) (51 - 92)  BP: 126/61 (2019 05:24) (102/64 - 147/82)  BP(mean): --  RR: 18 (2019 05:24) (17 - 18)  SpO2: 100% (2019 05:24) (97% - 100%)      PHYSICAL EXAM:  GENERAL: Pleasant older gentleman in NAD.   EYES: sclera clear  ENMT: No tonsillar erythema, exudates, or enlargement; Moist mucous membranes.  NECK: No JVD.  NERVOUS SYSTEM:  Alert & Oriented X3  CHEST/LUNG: Clear to auscultation bilaterally; No rales, rhonchi, wheezing.  HEART: Bradycardic, Regular rhythm; No murmurs, rubs, or gallops  ABDOMEN: Soft, TTP in RUQ, Nondistended; Bowel sounds normoactive  EXTREMITIES:  2+ Peripheral Pulses, No clubbing, cyanosis, or edema  SKIN: No rashes or lesions    LABS:                        12.9   7.69  )-----------( 229      ( 2019 05:50 )             42.3         135  |  103  |  25<H>  ----------------------------<  129<H>  3.8   |  20<L>  |  1.62<H>    Ca    8.6      2019 05:50  Phos  3.0       Mg     1.7         TPro  6.5  /  Alb  3.1<L>  /  TBili  0.5  /  DBili  x   /  AST  32  /  ALT  26  /  AlkPhos  92      PT/INR - ( 31 Oct 2019 05:18 )   PT: 17.6 SEC;   INR: 1.52          PTT - ( 31 Oct 2019 05:18 )  PTT:28.7 SEC    Urinalysis Basic - ( 2019 06:15 )    Color: LT. YELLOW / Appearance: CLEAR / S.009 / pH: 6.0  Gluc: NEGATIVE / Ketone: NEGATIVE  / Bili: NEGATIVE / Urobili: NORMAL   Blood: NEGATIVE / Protein: NEGATIVE / Nitrite: NEGATIVE   Leuk Esterase: SMALL / RBC: 0-2 / WBC 6-10   Sq Epi: OCC / Non Sq Epi: x / Bacteria: NEGATIVE      79y  Male    RADIOLOGY & ADDITIONAL TESTS: KETAN TIPTON  79y  Male      Patient is a 79y old  Male who presents with a chief complaint of Abdominal Pain (31 Oct 2019 08:50)      INTERVAL HPI/OVERNIGHT EVENTS: No acute events overnight. Patient interviewed and examined at bedside. Pt reports feeling well. States he still has abdominal pain when pressing on his stomach and cough +/- productive of clear/white sputum. Denies nausea, vomiting, diarrhea, constipation, blood in stool, black stools, SOB.       REVIEW OF SYSTEMS:  CONSTITUTIONAL: No fever or fatigue  ENMT:  +throat pain. No runny nose, sinus pain.  RESPIRATORY: + cough. No wheezing, chills or hemoptysis; No shortness of breath  CARDIOVASCULAR: No chest pain, palpitations, dizziness, or leg swelling  GASTROINTESTINAL: + mild abdominal pain. No nausea, vomiting, or hematemesis; No diarrhea or constipation. No melena or hematochezia.  GENITOURINARY: No dysuria, frequency, hematuria, or incontinence    HOSPITAL MEDICATIONS:  acetaminophen   Tablet .. 650 milliGRAM(s) Oral every 6 hours PRN  influenza   Vaccine 0.5 milliLiter(s) IntraMuscular once  lactated ringers. 1000 milliLiter(s) IV Continuous <Continuous>  octreotide  Infusion 50 MICROgram(s)/Hr IV Continuous <Continuous>  pantoprazole  Injectable 40 milliGRAM(s) IV Push two times a day  propranolol 10 milliGRAM(s) Oral two times a day    Vital Signs Last 24 Hrs  T(C): 36.3 (2019 05:24), Max: 36.6 (31 Oct 2019 12:43)  T(F): 97.4 (2019 05:24), Max: 97.9 (31 Oct 2019 21:57)  HR: 51 (2019 05:24) (51 - 92)  BP: 126/61 (2019 05:24) (102/64 - 147/82)  BP(mean): --  RR: 18 (2019 05:24) (17 - 18)  SpO2: 100% (2019 05:24) (97% - 100%)      PHYSICAL EXAM:  GENERAL: Pleasant older gentleman in NAD.   EYES: sclera clear  ENMT: No tonsillar erythema, exudates, or enlargement; Moist mucous membranes.  NECK: No JVD.  NERVOUS SYSTEM:  Alert & Oriented X3  CHEST/LUNG: Clear to auscultation bilaterally; No rales, rhonchi, wheezing.  HEART: Bradycardic, Regular rhythm; No murmurs, rubs, or gallops  ABDOMEN: Soft, TTP in RUQ, Nondistended; Bowel sounds normoactive  EXTREMITIES:  2+ Peripheral Pulses, No clubbing, cyanosis, or edema  SKIN: No rashes or lesions    LABS:                        12.9   7.69  )-----------( 229      ( 2019 05:50 )             42.3         135  |  103  |  25<H>  ----------------------------<  129<H>  3.8   |  20<L>  |  1.62<H>    Ca    8.6      2019 05:50  Phos  3.0       Mg     1.7         TPro  6.5  /  Alb  3.1<L>  /  TBili  0.5  /  DBili  x   /  AST  32  /  ALT  26  /  AlkPhos  92      PT/INR - ( 31 Oct 2019 05:18 )   PT: 17.6 SEC;   INR: 1.52          PTT - ( 31 Oct 2019 05:18 )  PTT:28.7 SEC    Urinalysis Basic - ( 2019 06:15 )    Color: LT. YELLOW / Appearance: CLEAR / S.009 / pH: 6.0  Gluc: NEGATIVE / Ketone: NEGATIVE  / Bili: NEGATIVE / Urobili: NORMAL   Blood: NEGATIVE / Protein: NEGATIVE / Nitrite: NEGATIVE   Leuk Esterase: SMALL / RBC: 0-2 / WBC 6-10   Sq Epi: OCC / Non Sq Epi: x / Bacteria: NEGATIVE      79y  Male    RADIOLOGY & ADDITIONAL TESTS:      Colonoscopy results pending.

## 2019-11-01 NOTE — PROGRESS NOTE ADULT - PROBLEM SELECTOR PLAN 7
Cr baseline ~ 1.2-1.5  - stable, within baseline, Cr rising likely from dehydration and melena, IVF were given on 10/30. Will continue to trend

## 2019-11-01 NOTE — MEDICAL STUDENT PROGRESS NOTE(EDUCATION) - NS MD HP STUD ASPLAN PLAN FT
#GI Bleed: Pt is hemodynamically stable and pt denies blood in stool overnight. Source of bleed possibly due to PUD 2/2 H. pylori infection vs esophageal varices.  -Results of colonoscopy pending. Will f/u with GI recs.  -C/w propanolol 10 mg BID, pantoprazole IV 40 mg BID, octreotide  -c/w antibiotic prophylaxis: Zosyn day 3  -Plan to begin triple therapy for H. pylori infection pending GI recs    #Abdominal pain: Etiology of abdominal pain is unclear. Pain is resolving since hospital admission. Peptic ulcer 2/2 duodenal inflammation from H. pylori infection is possible. Recent history significant for untreated positive H. pylori culture on recent endoscopy. Pt has a history of chronic choledocholithiasis no symptoms of cholangitis  -Continue tylenol max 2g daily for pain control  -Defer ERCP until GI bleed assessed. Pt is not a surgical candidate. May follow-up outpatient.    #SIRS: Leukocytosis,  on admission with downtrending lactate 4.4>3.5>2.6. Etiology of infection likely urinary tract infection. Leukocytosis has resolved. VS stable and WNL.  -Day 3 of Zosyn. Continue with course  - cc/hr  -Urine culture positive for E. coli  -Blood culture no grown organisms at 48 hours.    #Sore throat and cough: New presentation of sore throat and cough upon exam this morning. VS stable. Likely due to viral infection.  -Monitor VS. Patient is stable.      #CKD: Stage 3 CKD per outpatient chart. Basline Cr 1.2-1.5. Current SCr 1.62 likely due to NPO and dehydration.  -Stable. Monitor BMP qd  -Avoid possible nephrotoxic medications.  -F/u with GI re restarting solid diet.    #Liver cirrhosis: H/o decompensated alcoholic cirrhosis with esophageal varices (May 2018) and encephalopathy on lactulose.  -Restart lactulose to 10 g BID.   -Monitor mental status  -Trend MELD-Na labs- MELD-Na 18 today  -Continue with propranolol 10 mg BID #GI Bleed: Pt is hemodynamically stable and pt denies blood in stool overnight. Source of bleed possibly due to PUD 2/2 H. pylori infection vs esophageal varices.  -Results of colonoscopy pending. Will f/u with GI recs.  -C/w propanolol 10 mg BID, pantoprazole IV 40 mg BID  -Discontinue octreotide  -c/w antibiotic prophylaxis: Zosyn day 4  -Plan to begin triple therapy for H. pylori infection pending GI recs    #Abdominal pain: Etiology of abdominal pain is unclear. Pain is resolving since hospital admission. Peptic ulcer 2/2 duodenal inflammation from H. pylori infection is possible. Recent history significant for untreated positive H. pylori culture on recent endoscopy. Pt has a history of chronic choledocholithiasis no symptoms of cholangitis  -Continue tylenol max 2g daily for pain control  -Defer ERCP until GI bleed assessed. Pt is not a surgical candidate. May follow-up outpatient.    #SIRS: Leukocytosis,  on admission with downtrending lactate 4.4>3.5>2.6. Etiology of infection likely urinary tract infection. Leukocytosis has resolved. VS stable and WNL.  -Day 3 of Zosyn. Continue with course  - cc/hr  -Urine culture positive for E. coli  -Blood culture no grown organisms at 48 hours.    #Sore throat and cough: New presentation of sore throat and cough upon exam this morning. VS stable. Likely due to viral infection.  -Monitor VS. Patient is stable.      #CKD: Stage 3 CKD per outpatient chart. Basline Cr 1.2-1.5. Current SCr 1.62 likely due to NPO and dehydration.  -Stable. Monitor BMP qd  -Avoid possible nephrotoxic medications.  -F/u with GI re restarting solid diet.    #Liver cirrhosis: H/o decompensated alcoholic cirrhosis with esophageal varices (May 2018) and encephalopathy on lactulose.  -Restart lactulose to 10 g BID.   -Monitor mental status  -Trend MELD-Na labs- MELD-Na 18 today  -Continue with propranolol 10 mg BID #GI Bleed: Pt is hemodynamically stable and pt reporting fewer symptoms. Denies blood in stool overnight. Hgb/Hct stable on morning labs. Source of bleed possibly due to PUD 2/2 H. pylori infection vs esophageal varices.  -Results of colonoscopy pending. Will f/u with GI recs.  -C/w propanolol 10 mg BID, pantoprazole IV 40 mg BID  -Discontinue octreotide  -Pt no longer in need of antibiotic prophylaxis.  -Plan to begin triple therapy (PPI-amoxicillin-clarithromycin) for 14 days for H. pylori infection pending GI recs.    #Abdominal pain: Etiology of abdominal pain is unclear. Pain is resolving since hospital admission. Peptic ulcer 2/2 duodenal inflammation from H. pylori infection is possible. Recent history significant for untreated positive H. pylori culture on recent endoscopy. Pt has a history of chronic choledocholithiasis no symptoms of cholangitis  -S/p colonoscopy. Pending GI recs  -Continue Tylenol max 2g daily for pain control  -Defer ERCP until GI bleed assessed. Pt is not a surgical candidate. May follow-up outpatient.    #SIRS: Leukocytosis,  on admission with downtrending lactate 4.4>3.5>2.6. Etiology of infection likely urinary tract infection. Leukocytosis has resolved. VS stable and WNL. Urine culture positive for E. coli.  -Day 4 of Zosyn. Continue antibiotics for 5-7 days for asx UTI. Patient starting triple therapy pending GI recs. Consider   -Encourage PO intake of fluids  -Blood cultures obtained on 10/29. Negative to date.     #Sore throat and cough: Sore throat and cough upon exam this morning. VS stable. Likely due to viral infection vs. dehydration.  -Encourage PO intake of fluids  -Monitor VS. Patient is stable.      #CKD: Stage 3 CKD per outpatient chart. Basline Cr 1.2-1.5. Current SCr 1.62 likely due to NPO and dehydration.  -Stable. Monitor BMP qd  -Avoid possible nephrotoxic medications.  -F/u with GI re restarting solid diet.    #Liver cirrhosis: H/o decompensated alcoholic cirrhosis with esophageal varices (May 2018) and encephalopathy on lactulose.  -Restart lactulose to 10 g BID until final recs from Hepatology  -Monitor mental status  -Trend MELD-Na labs- MELD-Na 19 today  -Continue with propranolol 10 mg BID #GI Bleed: Pt is hemodynamically stable and pt reporting fewer symptoms. Denies blood in stool overnight. Hgb/Hct stable on morning labs. Source of bleed possibly due to PUD 2/2 H. pylori infection vs esophageal varices.  -Colonoscopy revealed multiple non-bleeding angioectasis, 5mm sigmoid polyp, rectal varices, and internal hemorrhoids likely source of GI bleed.   -C/w propanolol 10 mg BID, pantoprazole IV 40 mg BID  -Discontinue octreotide  -Pt no longer in need of antibiotic prophylaxis.  -Plan to begin triple therapy (pantoprazole 40 mg BID-amoxicillin 1g BID-clarithromycin 500 mg BID) for 14 days for H. pylori infection.  -Restart normal diet per GI recs.    #Abdominal pain: Etiology of abdominal pain is unclear. Pain is resolving since hospital admission. Peptic ulcer 2/2 duodenal inflammation from H. pylori infection is possible. Recent history significant for untreated positive H. pylori culture on recent endoscopy. Pt has a history of chronic choledocholithiasis no symptoms of cholangitis  -S/p colonoscopy. Pending GI recs  -Continue Tylenol max 2g daily for pain control  -Defer ERCP until GI bleed assessed. Pt is not a surgical candidate. May follow-up outpatient.    #SIRS: Leukocytosis,  on admission with downtrending lactate 4.4>3.5>2.6. Etiology of infection likely urinary tract infection. Leukocytosis has resolved. VS stable and WNL. Urine culture positive for E. coli.  -Day 4 of Zosyn. Patient starting triple therapy. Sensitivity reports show e. coli sensitive to ampicillin. Can discontinue zosyn at start of triple therapy.  -Encourage PO intake of fluids  -Blood cultures obtained on 10/29. Negative to date.     #Sore throat and cough: Sore throat and cough upon exam this morning. VS stable. Likely due to viral infection vs. dehydration.  -Encourage PO intake of fluids  -Monitor VS. Patient is stable.      #CKD: Stage 3 CKD per outpatient chart. Basline Cr 1.2-1.5. Current SCr 1.62 likely due to NPO and dehydration.  -Stable. Monitor BMP qd  -Avoid possible nephrotoxic medications.    #Liver cirrhosis: H/o decompensated alcoholic cirrhosis with esophageal varices (May 2018) and encephalopathy on lactulose.  -Restart lactulose to 10 g BID per recs from Hepatology. Titrate up to 3-4 BMs per day.  -Monitor mental status  -Trend MELD-Na labs- MELD-Na 19 today  -Continue with propranolol 10 mg BID

## 2019-11-01 NOTE — PROGRESS NOTE ADULT - SUBJECTIVE AND OBJECTIVE BOX
Chief Complaint:  Patient is a 79y old  Male who presents with a chief complaint of Abdominal Pain (2019 09:13)      Interval Events: No adverse events overnight.  PT feels weak and cannot go home.  Had colonoscopy yesterday.    Allergies:  Allergy Status Unknown  No Known Allergies      Hospital Medications:  acetaminophen   Tablet .. 650 milliGRAM(s) Oral every 6 hours PRN  influenza   Vaccine 0.5 milliLiter(s) IntraMuscular once  lactated ringers. 1000 milliLiter(s) IV Continuous <Continuous>  lactulose Syrup 10 Gram(s) Oral two times a day  octreotide  Infusion 50 MICROgram(s)/Hr IV Continuous <Continuous>  pantoprazole  Injectable 40 milliGRAM(s) IV Push two times a day  propranolol 10 milliGRAM(s) Oral two times a day      PMHX/PSHX:  Liver cirrhosis  Guillain-[place] syndrome  HTN (hypertension)  H/O inguinal hernia repair      Family history:  Family history of ovarian cancer (Sibling)      ROS:     General:  No wt loss, fevers, chills, night sweats, fatigue,   Eyes:  Good vision, no reported pain  ENT:  No sore throat, pain, runny nose, dysphagia  CV:  No pain, palpitations, hypo/hypertension  Resp:  No dyspnea, cough, tachypnea, wheezing  GI:  See HPI  :  No pain, bleeding, incontinence, nocturia  Muscle:  No pain, weakness  Neuro:  No weakness, tingling, memory problems  Psych:  No fatigue, insomnia, mood problems, depression  Endocrine:  No polyuria, polydipsia, cold/heat intolerance  Heme:  No petechiae, ecchymosis, easy bruisability  Skin:  No rash, edema      PHYSICAL EXAM:     GENERAL: NAD  HEENT:  NC/AT  CHEST:  Full & symmetric excursion, no increased effort  ABDOMEN:  Soft, non-tender, non-distended, +BS  EXTREMITIES:  no edema  SKIN:  No rash  NEURO:  Alert      Vital Signs:  Vital Signs Last 24 Hrs  T(C): 36.6 (2019 12:54), Max: 36.6 (31 Oct 2019 21:57)  T(F): 97.9 (2019 12:54), Max: 97.9 (31 Oct 2019 21:57)  HR: 57 (2019 12:54) (51 - 68)  BP: 146/85 (2019 12:54) (112/65 - 147/82)  BP(mean): --  RR: 19 (2019 12:54) (17 - 19)  SpO2: 97% (2019 12:54) (97% - 100%)  Daily     Daily     LABS:                        12.9   7.69  )-----------( 229      ( 2019 05:50 )             42.3         135  |  103  |  25<H>  ----------------------------<  129<H>  3.8   |  20<L>  |  1.62<H>    Ca    8.6      2019 05:50  Phos  3.0       Mg     1.7         TPro  6.5  /  Alb  3.1<L>  /  TBili  0.5  /  DBili  x   /  AST  32  /  ALT  26  /  AlkPhos  92      LIVER FUNCTIONS - ( 2019 05:50 )  Alb: 3.1 g/dL / Pro: 6.5 g/dL / ALK PHOS: 92 u/L / ALT: 26 u/L / AST: 32 u/L / GGT: x           PT/INR - ( 31 Oct 2019 05:18 )   PT: 17.6 SEC;   INR: 1.52          PTT - ( 31 Oct 2019 05:18 )  PTT:28.7 SEC  Urinalysis Basic - ( 2019 06:15 )    Color: LT. YELLOW / Appearance: CLEAR / S.009 / pH: 6.0  Gluc: NEGATIVE / Ketone: NEGATIVE  / Bili: NEGATIVE / Urobili: NORMAL   Blood: NEGATIVE / Protein: NEGATIVE / Nitrite: NEGATIVE   Leuk Esterase: SMALL / RBC: 0-2 / WBC 6-10   Sq Epi: OCC / Non Sq Epi: x / Bacteria: NEGATIVE          Imaging:  < from: Colonoscopy (10.31.19 @ 13:56) >    [facility]  _______________________________________________________________________________  Patient Name: Anthony Florez            Procedure Date: 10/31/2019 1:56 PM  MRN: [id number]                     Account Number: [id number]  YOB: 1940              Admit Type: Inpatient  Room: 3                               Gender: Male  Attending MD: BOBBI HENDRIX MD    _______________________________________________________________________________     Procedure:           Colonoscopy  Indications:         Hematochezia  Providers:           BOBBI HENDRIX MD. Matheus Rodriguez MD.  Medicines:           Monitored Anesthesia Care  Complications:       No immediate complications. Estimated blood loss:                        Minimal.  Procedure:           Pre-Anesthesia Assessment:                       - Prior to the procedure, a History and Physical was                        performed, and patient medications and allergies were       reviewed. The patient is competent. The risks and                        benefits of the procedure and the sedation options and                        risks were discussed with the patient. All questions                        were answered and informed consent was obtained. Patient                        identification and proposed procedure were verified by                        the physician, the nurse and the anesthesiologist in the                        pre-procedure area in the procedure room in the                        endoscopy suite. Mental Status Examination: normal.                        Airway Examination: normal oropharyngeal airway and neck                        mobility. Respiratory Examination: clear to            auscultation. CV Examination: normal. Prophylactic                        Antibiotics: The patient does not require prophylactic                        antibiotics. Prior Anticoagulants: The patient has taken                        no previous anticoagulant or antiplatelet agents. ASA                        Grade Assessment: III - A patient with severe systemic                        disease. After reviewing the risks and benefits, the                        patient was deemed in satisfactory condition to undergo                        the procedure. The anesthesia plan was to use monitored                        anesthesia care (MAC). Immediately prior to                        administration of medications, the patient was                 re-assessed for adequacy to receive sedatives. The heart                        rate, respiratory rate, oxygen saturations, blood                        pressure, adequacy of pulmonary ventilation, and                        response tocare were monitored throughout the                        procedure. The physical status of the patient was                        re-assessed after the procedure.                       After I obtained informed consent, the scope was passed                 under direct vision. Throughout the procedure, the                        patient's blood pressure, pulse, and oxygen saturations                        were monitored continuously. The Colonoscope was                        introduced through the anus and advanced to the terminal                        ileum. The colonoscopy was performed without difficulty.                        The patient tolerated the procedure well. The quality of                        the bowel preparation was good.                                                                                   Findings:       The terminal ileum appeared normal.       A single medium-sized localized angioectasia without bleeding was found        in the proximal ascending colon. Coagulation for bleeding prevention        using argon plasma at 2 liters/minute and 20 woodruff was successful.        Estimated blood loss was minimal.       A single small patchy angiodysplastic lesion without bleeding was found        in the sigmoid colon. Coagulation for bleeding prevention using argon        plasma at 2 liters/minute and 20 woodruff was successful. Estimated blood        loss: none.       A less than 5 mm polyp was found in the sigmoid colon. The polyp was        sessile. The polyp was removed with a cold biopsy forceps. Resection and        retrieval were complete. Estimated blood loss was minimal.       A single medium-sized patchy angiodysplastic lesion without bleeding was        found in the distal rectum on the rectal varix. Coagulation for bleeding        prevention using argon plasma at 2 liters/minute and 20 woodruff was        successful. Estimated blood loss: none.       A 7 mm rectal varix was found.       Internal hemorrhoids were found during retroflexion. The hemorrhoids        were moderate and medium-sized.       The perianal and digital rectal examinations were normal.                                                                                   Impression:          - The examined portion of the ileum was normal.                       - A single non-bleeding colonic angioectasia. Treated                        with argon plasma coagulation (APC).                       - A single non-bleeding colonic angiodysplastic lesion.             Treated with argon plasma coagulation (APC).                       - One less than 5 mm polyp in the sigmoid colon, removed                        with a jumbo cold forceps. Resected and retrieved.                       - A single non-bleeding colonic angiodysplastic lesion.                        Treated with argon plasma coagulation (APC).                       - Rectal varices.                       - Internal hemorrhoids. Likely the source of bleeding.  Recommendation:      - [facility].                       - Use Lactulose at 15 mL PO BID.                       - Resume previous diet.                                                                                   Attending Participation:       I was present and participated during the entire procedure, including        non-key portions.                                                                                       _____________________  BOBBI HENDRIX MD  2019 8:30:45AM  This report has been signed electronically.  Number of Addenda: 0    Note Initiated On: 10/31/2019 1:56 PM    < end of copied text >

## 2019-11-01 NOTE — PROGRESS NOTE ADULT - PROBLEM SELECTOR PLAN 2
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 pylori Colicky upper abd pain +/- associated w/ food  - ddx includes PUD vs choledocholithiasis, pancreatitis, duodenitis, mesenteric ischemia  - CT A/P shows cholelithiasis, choledocholithiasis w/ CBD dilitation; neg pancreatitis    - given hematochezia this admission, c/f bleeding PUD  - s/p colonoscopy (10/31)  - low likelihood of ischemic abdominal pathology at this time  - per GI, can defer ERCP until GI bleed is addressed  - will start triple therapy for H. pylori

## 2019-11-01 NOTE — PROGRESS NOTE ADULT - SUBJECTIVE AND OBJECTIVE BOX
Chief Complaint:  Patient is a 79y old  Male who presents with a chief complaint of Abdominal Pain (2019 08:00)      Interval Events: Pt s/p colonoscopy yesterday. Colonoscopy revealed multiple nonbleeding colonic angioectasia s/p APC, 5mm sigmoid polyp which was removed, also large hemorrhoids.   Hgb now 12.9 (12.5 yesterday). INR 1.52. Abd pain improved. No blood transfusions overnight.   ROS: All 12 point system except listed above were otherwise negative.    Allergies:  Allergy Status Unknown  No Known Allergies        Hospital Medications:  acetaminophen   Tablet .. 650 milliGRAM(s) Oral every 6 hours PRN  influenza   Vaccine 0.5 milliLiter(s) IntraMuscular once  lactated ringers. 1000 milliLiter(s) IV Continuous <Continuous>  lactulose Syrup 10 Gram(s) Oral two times a day  octreotide  Infusion 50 MICROgram(s)/Hr IV Continuous <Continuous>  pantoprazole  Injectable 40 milliGRAM(s) IV Push two times a day  propranolol 10 milliGRAM(s) Oral two times a day      PMHX/PSHX:  Liver cirrhosis  Guillain-[place] syndrome  HTN (hypertension)  H/O inguinal hernia repair      Family history:  Family history of ovarian cancer (Sibling)    There is no family history of peptic ulcer disease, gastric cancer, colon polyps, colon cancer, celiac disease, biliary, hepatic, or pancreatic disease.  None of the female relatives have breast, uterine, or ovarian cancer.     PHYSICAL EXAM:   Vital Signs:  Vital Signs Last 24 Hrs  T(C): 36.3 (2019 05:24), Max: 36.6 (31 Oct 2019 12:43)  T(F): 97.4 (2019 05:24), Max: 97.9 (31 Oct 2019 21:57)  HR: 51 (2019 05:24) (51 - 92)  BP: 126/61 (2019 05:24) (102/64 - 147/82)  BP(mean): --  RR: 18 (2019 05:24) (17 - 18)  SpO2: 100% (2019 05:24) (97% - 100%)  Daily     Daily     GENERAL:  Appears stated age, well-groomed, well-nourished, no distress  EYES: No scleral icterus   LUNG: Clear to auscultation bilaterally;   HEART: s1 and s2 heard   ABDOMEN: Soft, +BS, no abd distension, no Abdominal Tenderness, No guarding, No Garduno Sign   Neuro: AAO x3 , no asterix         LABS:                        12.9   7.69  )-----------( 229      ( 2019 05:50 )             42.3     Mean Cell Volume: 82.0 fL (19 @ 05:50)        135  |  103  |  25<H>  ----------------------------<  129<H>  3.8   |  20<L>  |  1.62<H>    Ca    8.6      2019 05:50  Phos  3.0       Mg     1.7         TPro  6.5  /  Alb  3.1<L>  /  TBili  0.5  /  DBili  x   /  AST  32  /  ALT  26  /  AlkPhos  92      LIVER FUNCTIONS - ( 2019 05:50 )  Alb: 3.1 g/dL / Pro: 6.5 g/dL / ALK PHOS: 92 u/L / ALT: 26 u/L / AST: 32 u/L / GGT: x           PT/INR - ( 31 Oct 2019 05:18 )   PT: 17.6 SEC;   INR: 1.52          PTT - ( 31 Oct 2019 05:18 )  PTT:28.7 SEC  Urinalysis Basic - ( 2019 06:15 )    Color: LT. YELLOW / Appearance: CLEAR / S.009 / pH: 6.0  Gluc: NEGATIVE / Ketone: NEGATIVE  / Bili: NEGATIVE / Urobili: NORMAL   Blood: NEGATIVE / Protein: NEGATIVE / Nitrite: NEGATIVE   Leuk Esterase: SMALL / RBC: 0-2 / WBC 6-10   Sq Epi: OCC / Non Sq Epi: x / Bacteria: NEGATIVE                              12.9   7.69  )-----------( 229      ( 2019 05:50 )             42.3                         12.5   8.16  )-----------( 201      ( 31 Oct 2019 23:09 )             39.6                         13.1   7.81  )-----------( 219      ( 31 Oct 2019 05:15 )             43.4                         12.9   9.36  )-----------( 213      ( 30 Oct 2019 23:20 )             41.1                         12.6   8.56  )-----------( 209      ( 30 Oct 2019 14:20 )             40.5     Imaging:      < from: Colonoscopy (10.31.19 @ 13:56) >  Findings:       The terminal ileum appeared normal.       A single medium-sized localized angioectasia without bleeding was found        in the proximal ascending colon. Coagulation for bleeding prevention        using argon plasma at 2 liters/minute and 20 woodruff was successful.        Estimated blood loss was minimal.       A single small patchy angiodysplastic lesion without bleeding was found        in the sigmoid colon. Coagulation for bleeding prevention using argon        plasma at 2 liters/minute and 20 woodruff was successful. Estimated blood        loss: none.       A less than 5 mm polyp was found in the sigmoid colon. The polyp was        sessile. The polyp was removed with a cold biopsy forceps. Resection and        retrieval were complete. Estimated blood loss was minimal.       A single medium-sized patchy angiodysplastic lesion without bleeding was        found in the distal rectum on the rectal varix. Coagulation for bleeding        prevention using argon plasma at 2 liters/minute and 20 woodruff was        successful. Estimated blood loss: none.       A 7 mm rectal varix was found.       Internal hemorrhoids were found during retroflexion. The hemorrhoids        were moderate and medium-sized.       The perianal and digital rectal examinations were normal.                                                                                   Impression:          - The examined portion of the ileum was normal.                       - A single non-bleeding colonic angioectasia. Treated                        with argon plasma coagulation (APC).                       - A single non-bleeding colonic angiodysplastic lesion.             Treated with argon plasma coagulation (APC).                       - One less than 5 mm polyp in the sigmoid colon, removed                        with a jumbo cold forceps. Resected and retrieved.                       - A single non-bleeding colonic angiodysplastic lesion.                        Treated with argon plasma coagulation (APC).                       - Rectal varices.                       - Internal hemorrhoids. Likely the source of bleeding.  Recommendation:      - [facility]                       - Use Lactulose at 15 mL PO BID.                       - Resume previous diet.                                                                                   Attending Participation:       I was present and participated during the entire procedure, including        non-key portions.                                                                                       < end of copied text > Chief Complaint:  Patient is a 79y old  Male who presents with a chief complaint of Abdominal Pain (2019 08:00)      Interval Events: Pt s/p colonoscopy yesterday. Colonoscopy revealed three nonbleeding colonic angioectasias that were treated with APC, 5mm sigmoid polyp which was removed, also rectal varix and hemorrhoids.   Hgb now 12.9 (12.5 yesterday). INR 1.52. Abd pain improved. No blood transfusions overnight.   ROS: All 12 point system except listed above were otherwise negative.    Allergies:  Allergy Status Unknown  No Known Allergies        Hospital Medications:  acetaminophen   Tablet .. 650 milliGRAM(s) Oral every 6 hours PRN  influenza   Vaccine 0.5 milliLiter(s) IntraMuscular once  lactated ringers. 1000 milliLiter(s) IV Continuous <Continuous>  lactulose Syrup 10 Gram(s) Oral two times a day  octreotide  Infusion 50 MICROgram(s)/Hr IV Continuous <Continuous>  pantoprazole  Injectable 40 milliGRAM(s) IV Push two times a day  propranolol 10 milliGRAM(s) Oral two times a day      PMHX/PSHX:  Liver cirrhosis  Guillain-[place] syndrome  HTN (hypertension)  H/O inguinal hernia repair      Family history:  Family history of ovarian cancer (Sibling)    There is no family history of peptic ulcer disease, gastric cancer, colon polyps, colon cancer, celiac disease, biliary, hepatic, or pancreatic disease.  None of the female relatives have breast, uterine, or ovarian cancer.     PHYSICAL EXAM:   Vital Signs:  Vital Signs Last 24 Hrs  T(C): 36.3 (2019 05:24), Max: 36.6 (31 Oct 2019 12:43)  T(F): 97.4 (2019 05:24), Max: 97.9 (31 Oct 2019 21:57)  HR: 51 (2019 05:24) (51 - 92)  BP: 126/61 (2019 05:24) (102/64 - 147/82)  BP(mean): --  RR: 18 (2019 05:24) (17 - 18)  SpO2: 100% (2019 05:24) (97% - 100%)  Daily     Daily     GENERAL:  Appears stated age, well-groomed, well-nourished, no distress  EYES: No scleral icterus   LUNG: Clear to auscultation bilaterally;   HEART: s1 and s2 heard   ABDOMEN: Soft, +BS, no abd distension, no Abdominal Tenderness, No guarding, No Garduno Sign   Neuro: AAO x3 , no asterix         LABS:                        12.9   7.69  )-----------( 229      ( 2019 05:50 )             42.3     Mean Cell Volume: 82.0 fL (-19 @ 05:50)        135  |  103  |  25<H>  ----------------------------<  129<H>  3.8   |  20<L>  |  1.62<H>    Ca    8.6      2019 05:50  Phos  3.0       Mg     1.7         TPro  6.5  /  Alb  3.1<L>  /  TBili  0.5  /  DBili  x   /  AST  32  /  ALT  26  /  AlkPhos  92      LIVER FUNCTIONS - ( 2019 05:50 )  Alb: 3.1 g/dL / Pro: 6.5 g/dL / ALK PHOS: 92 u/L / ALT: 26 u/L / AST: 32 u/L / GGT: x           PT/INR - ( 31 Oct 2019 05:18 )   PT: 17.6 SEC;   INR: 1.52          PTT - ( 31 Oct 2019 05:18 )  PTT:28.7 SEC  Urinalysis Basic - ( 2019 06:15 )    Color: LT. YELLOW / Appearance: CLEAR / S.009 / pH: 6.0  Gluc: NEGATIVE / Ketone: NEGATIVE  / Bili: NEGATIVE / Urobili: NORMAL   Blood: NEGATIVE / Protein: NEGATIVE / Nitrite: NEGATIVE   Leuk Esterase: SMALL / RBC: 0-2 / WBC 6-10   Sq Epi: OCC / Non Sq Epi: x / Bacteria: NEGATIVE                              12.9   7.69  )-----------( 229      ( 2019 05:50 )             42.3                         12.5   8.16  )-----------( 201      ( 31 Oct 2019 23:09 )             39.6                         13.1   7.81  )-----------( 219      ( 31 Oct 2019 05:15 )             43.4                         12.9   9.36  )-----------( 213      ( 30 Oct 2019 23:20 )             41.1                         12.6   8.56  )-----------( 209      ( 30 Oct 2019 14:20 )             40.5     Imaging:      < from: Colonoscopy (10.31.19 @ 13:56) >  Findings:       The terminal ileum appeared normal.       A single medium-sized localized angioectasia without bleeding was found        in the proximal ascending colon. Coagulation for bleeding prevention        using argon plasma at 2 liters/minute and 20 woodruff was successful.        Estimated blood loss was minimal.       A single small patchy angiodysplastic lesion without bleeding was found        in the sigmoid colon. Coagulation for bleeding prevention using argon        plasma at 2 liters/minute and 20 woodruff was successful. Estimated blood        loss: none.       A less than 5 mm polyp was found in the sigmoid colon. The polyp was        sessile. The polyp was removed with a cold biopsy forceps. Resection and        retrieval were complete. Estimated blood loss was minimal.       A single medium-sized patchy angiodysplastic lesion without bleeding was        found in the distal rectum on the rectal varix. Coagulation for bleeding        prevention using argon plasma at 2 liters/minute and 20 woodruff was        successful. Estimated blood loss: none.       A 7 mm rectal varix was found.       Internal hemorrhoids were found during retroflexion. The hemorrhoids        were moderate and medium-sized.       The perianal and digital rectal examinations were normal.                                                                                   Impression:          - The examined portion of the ileum was normal.                       - A single non-bleeding colonic angioectasia. Treated                        with argon plasma coagulation (APC).                       - A single non-bleeding colonic angiodysplastic lesion.             Treated with argon plasma coagulation (APC).                       - One less than 5 mm polyp in the sigmoid colon, removed                        with a jumbo cold forceps. Resected and retrieved.                       - A single non-bleeding colonic angiodysplastic lesion.                        Treated with argon plasma coagulation (APC).                       - Rectal varices.                       - Internal hemorrhoids. Likely the source of bleeding.  Recommendation:      - [facility]                       - Use Lactulose at 15 mL PO BID.                       - Resume previous diet.                                                                                   Attending Participation:       I was present and participated during the entire procedure, including        non-key portions.                                                                                       < end of copied text >

## 2019-11-01 NOTE — MEDICAL STUDENT PROGRESS NOTE(EDUCATION) - NS MD HP STUD ASPLAN ASSES FT
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

## 2019-11-01 NOTE — PROGRESS NOTE ADULT - PROBLEM SELECTOR PLAN 1
possible variceal (less likely) vs PUD vs duodenal ulcer, known duodenitis and H. pylori from Sept 2019 surgical pathology that hasn't been treated  - given protonix 80 IVP x1, continue protonix 40mg IV BID  - H/H stable   - keep T/S active, transfuse for Hgb <8 or with symptoms or continued bleeding  - Hepatology consulted, recs appreciated  - GI consulted; s/p colonoscopy +/- EGD today

## 2019-11-01 NOTE — PROGRESS NOTE ADULT - PROBLEM SELECTOR PLAN 3
patient had sepsis on admission with Leukocytosis and tachycardia to 130 on admission likely from UTI and possible GI translocation in setting of new GIB.  Lactate 4.4 now downtrending s/p IVF.  +UA. Lungs clear on CXR, blood and urine cultures pending.  Pt remains afebrile.   - UCx grew E. coli resistant to Levoquin, but sensitive to other tested abx  - c/w zosyn (started 10/29)  - transition to ?  - encourage PO water intake  - lactate downtrending patient had sepsis on admission with Leukocytosis and tachycardia to 130 on admission likely from UTI and possible GI translocation in setting of new GIB.  Lactate 4.4 now downtrending s/p IVF.  +UA. Lungs clear on CXR, blood and urine cultures pending.  Pt remains afebrile.   - UCx grew E. coli resistant to Levoquin, but sensitive to other tested abx  - c/w zosyn (started 10/29)  - will d/c zosyn; E. coli from UCx sensitive to beta-lactam, covered by amoxicillin from triple therapy for H. pylori  - encourage PO water intake  - lactate downtrending

## 2019-11-01 NOTE — PROGRESS NOTE ADULT - PROBLEM SELECTOR PLAN 5
Elevated lactate on presentation, concern for infection vs mesenteric ischemia    - encourage PO intake  - not trending lactate

## 2019-11-01 NOTE — PROGRESS NOTE ADULT - ASSESSMENT
Impression:  # Hematochezia, s/p colonoscopy revealing 10/31 revealing multiple nonbleeding colonic angioectasia s/p APC, 5mm sigmoid polyp which was removed, also large hemorrhoids, likely source of bleeding  # Choledocholithiasis without elevated liver enzymes (Tbili 0.6, AST 47, ALT 31,  Alk phos 104), CT with CBD dilation to 0.9cm and choledocholithiasis, also seen on a previous CT scan  # Porcelain gallbladder with gallstones    Given liver cirrhosis, 30d mortality was estimated to be >35% at 90 days recently, and it was discussed with the patient not to undergo cholecystectomy  # Decompensated alcohol Liver Cirrhosis , MELD Na - 17  Last drink of alcohol - 18 months ago     EV bleed in 2018, banded, small varices on EGD 9/2019, currently on Propanolol 10 BID, Current HR -68     No ascites , No splenomegaly, No Thrombocytopenia     remote h/o HE, currently AAO x3, no asterixis     coagulopathy , INR - 1.52    Recent CT abd with IV con - no liver mass     Recommendations:  -C/w Propanolol 10 BID   -Start Lactulose 15mL PO BID, titrate to 3-4 BMs  -Encourage alcohol abstinence,   -Monitor CBC and BMs  -Advanced GI follow up Impression:  # Hematochezia, s/p colonoscopy revealing 10/31 revealing multiple nonbleeding colonic angioectasia s/p APC, 5mm sigmoid polyp which was removed, also large hemorrhoids. Hemorrhoids likely source of bleeding  # Choledocholithiasis without elevated liver enzymes (Tbili 0.6, AST 47, ALT 31,  Alk phos 104), CT with CBD dilation to 0.9cm and choledocholithiasis, also seen on a previous CT scan  # Porcelain gallbladder with gallstones    Given liver cirrhosis, 30d mortality was estimated to be >35% at 90 days recently, and it was discussed with the patient not to undergo cholecystectomy  # Decompensated alcohol Liver Cirrhosis , MELD Na - 17  Last drink of alcohol - 18 months ago     EV bleed in 2018, banded, small varices on EGD 9/2019, currently on Propanolol 10 BID, Current HR -68     No ascites , No splenomegaly, No Thrombocytopenia     remote h/o HE, currently AAO x3, no asterixis     coagulopathy , INR - 1.52    Recent CT abd with IV con - no liver mass     Recommendations:  -C/w Propanolol 10 BID   -Start Lactulose 15mL PO BID, titrate to 3-4 BMs  -Encourage alcohol abstinence,   -Monitor CBC and BMs  -Advanced GI follow up

## 2019-11-01 NOTE — PROGRESS NOTE ADULT - ASSESSMENT
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  Admitted with SIRS possibly 2/2 PUD (H. pylori+ on previous EGD never started on triple therapy), vs choledocholithiasis vs. pancreatitis and acute blood loss anemia likely from PUD vs varices.  Course c/b E. coli bacteruria

## 2019-11-01 NOTE — PROGRESS NOTE ADULT - ASSESSMENT
Impression:  79 year old man from home, PMH of [facility] (1996, hospitalized for 4 months), decompensated alcoholic cirrhosis (varices: 5/27/18; no ascites per CT; encephalopathy: on lactulose; ?HCC: concerning lesion seen on MR; off transplant list 2/2 age), porcelain gallbladder, SMA stenosis, s/p b/l inguinal hernia repair, presenting with diffuse abdominal pain x 2 days.      - Abdominal pain without elevated liver enzymes (Tbili 0.8, AST 28, ALT 21, Alk phos 119), CT with CBD dilation to 0.9cm and choledocholithiasis, also seen on a previous CT scan  - Cholelithiasis and porcelain gallbladder  - Hematochezia, currently HD stable and Hb 14  - Cirrhosis       RECS:  - can tentatively plan for ERCP for stone removal early next week (Mon/Tues) - make NPO after midnight on Sunday  - trend CBC, CMP, INR  -physical therapy per primary team  - surgery consult for eventual cholecystectomy  - supportive care as per primary team

## 2019-11-01 NOTE — CHART NOTE - NSCHARTNOTEFT_GEN_A_CORE
79M child class A, MELD 20 cirrhotic patient with choledocholithiasis, possible duodenitis on CTAP.    Pt seen and examined, NAD. Abdominal exam unremarkable, unchanged from prior with no tenderness. Pt complaints unchanged, intermittent crampy/achy abdominal pain.    Plan:  - No acute surgical intervention   - GI planning EUS for possible UGIB likely today, plan eventual ERCP and stone extraction. Consider brushings as filling defect has been present on cross sectional imaging for two years.   - Surgery will sign off, please call with any questions    Luis Akers, PGY 2  B Team Surgery [phone]

## 2019-11-02 LAB
ALBUMIN SERPL ELPH-MCNC: 3.3 G/DL — SIGNIFICANT CHANGE UP (ref 3.3–5)
ALP SERPL-CCNC: 96 U/L — SIGNIFICANT CHANGE UP (ref 40–120)
ALT FLD-CCNC: 24 U/L — SIGNIFICANT CHANGE UP (ref 4–41)
ANION GAP SERPL CALC-SCNC: 12 MMO/L — SIGNIFICANT CHANGE UP (ref 7–14)
AST SERPL-CCNC: 22 U/L — SIGNIFICANT CHANGE UP (ref 4–40)
BACTERIA BLD CULT: SIGNIFICANT CHANGE UP
BACTERIA BLD CULT: SIGNIFICANT CHANGE UP
BILIRUB SERPL-MCNC: 0.3 MG/DL — SIGNIFICANT CHANGE UP (ref 0.2–1.2)
BUN SERPL-MCNC: 21 MG/DL — SIGNIFICANT CHANGE UP (ref 7–23)
CALCIUM SERPL-MCNC: 8.7 MG/DL — SIGNIFICANT CHANGE UP (ref 8.4–10.5)
CHLORIDE SERPL-SCNC: 104 MMOL/L — SIGNIFICANT CHANGE UP (ref 98–107)
CO2 SERPL-SCNC: 19 MMOL/L — LOW (ref 22–31)
CREAT SERPL-MCNC: 1.43 MG/DL — HIGH (ref 0.5–1.3)
GLUCOSE SERPL-MCNC: 147 MG/DL — HIGH (ref 70–99)
HCT VFR BLD CALC: 42.1 % — SIGNIFICANT CHANGE UP (ref 39–50)
HGB BLD-MCNC: 12.9 G/DL — LOW (ref 13–17)
INR BLD: 1.54 — HIGH (ref 0.88–1.17)
MAGNESIUM SERPL-MCNC: 1.7 MG/DL — SIGNIFICANT CHANGE UP (ref 1.6–2.6)
MCHC RBC-ENTMCNC: 25.2 PG — LOW (ref 27–34)
MCHC RBC-ENTMCNC: 30.6 % — LOW (ref 32–36)
MCV RBC AUTO: 82.4 FL — SIGNIFICANT CHANGE UP (ref 80–100)
NRBC # FLD: 0 K/UL — SIGNIFICANT CHANGE UP (ref 0–0)
PHOSPHATE SERPL-MCNC: 2.3 MG/DL — LOW (ref 2.5–4.5)
PLATELET # BLD AUTO: 235 K/UL — SIGNIFICANT CHANGE UP (ref 150–400)
PMV BLD: 10.7 FL — SIGNIFICANT CHANGE UP (ref 7–13)
POTASSIUM SERPL-MCNC: 4.4 MMOL/L — SIGNIFICANT CHANGE UP (ref 3.5–5.3)
POTASSIUM SERPL-SCNC: 4.4 MMOL/L — SIGNIFICANT CHANGE UP (ref 3.5–5.3)
PROT SERPL-MCNC: 6.7 G/DL — SIGNIFICANT CHANGE UP (ref 6–8.3)
PROTHROM AB SERPL-ACNC: 17.8 SEC — HIGH (ref 9.8–13.1)
RBC # BLD: 5.11 M/UL — SIGNIFICANT CHANGE UP (ref 4.2–5.8)
RBC # FLD: 16.9 % — HIGH (ref 10.3–14.5)
SODIUM SERPL-SCNC: 135 MMOL/L — SIGNIFICANT CHANGE UP (ref 135–145)
WBC # BLD: 9.89 K/UL — SIGNIFICANT CHANGE UP (ref 3.8–10.5)
WBC # FLD AUTO: 9.89 K/UL — SIGNIFICANT CHANGE UP (ref 3.8–10.5)

## 2019-11-02 PROCEDURE — 99232 SBSQ HOSP IP/OBS MODERATE 35: CPT | Mod: GC

## 2019-11-02 RX ORDER — LACTULOSE 10 G/15ML
15 SOLUTION ORAL
Refills: 0 | Status: DISCONTINUED | OUTPATIENT
Start: 2019-11-02 | End: 2019-11-08

## 2019-11-02 RX ORDER — LACTULOSE 10 G/15ML
10 SOLUTION ORAL ONCE
Refills: 0 | Status: COMPLETED | OUTPATIENT
Start: 2019-11-02 | End: 2019-11-02

## 2019-11-02 RX ORDER — SODIUM,POTASSIUM PHOSPHATES 278-250MG
1 POWDER IN PACKET (EA) ORAL ONCE
Refills: 0 | Status: COMPLETED | OUTPATIENT
Start: 2019-11-02 | End: 2019-11-02

## 2019-11-02 RX ADMIN — LACTULOSE 10 GRAM(S): 10 SOLUTION ORAL at 10:15

## 2019-11-02 RX ADMIN — LACTULOSE 15 GRAM(S): 10 SOLUTION ORAL at 17:32

## 2019-11-02 RX ADMIN — Medication 1000 MILLIGRAM(S): at 17:32

## 2019-11-02 RX ADMIN — LACTULOSE 10 GRAM(S): 10 SOLUTION ORAL at 05:26

## 2019-11-02 RX ADMIN — Medication 500 MILLIGRAM(S): at 17:34

## 2019-11-02 RX ADMIN — Medication 500 MILLIGRAM(S): at 05:39

## 2019-11-02 RX ADMIN — Medication 1000 MILLIGRAM(S): at 05:26

## 2019-11-02 RX ADMIN — Medication 10 MILLIGRAM(S): at 17:31

## 2019-11-02 RX ADMIN — PANTOPRAZOLE SODIUM 40 MILLIGRAM(S): 20 TABLET, DELAYED RELEASE ORAL at 05:26

## 2019-11-02 RX ADMIN — Medication 1 PACKET(S): at 12:08

## 2019-11-02 RX ADMIN — Medication 10 MILLIGRAM(S): at 05:26

## 2019-11-02 RX ADMIN — PANTOPRAZOLE SODIUM 40 MILLIGRAM(S): 20 TABLET, DELAYED RELEASE ORAL at 17:31

## 2019-11-02 NOTE — PROGRESS NOTE ADULT - PROBLEM SELECTOR PLAN 3
patient had sepsis on admission with Leukocytosis and tachycardia to 130 on admission likely from UTI and possible GI translocation in setting of new GIB.  Lactate 4.4 now downtrending s/p IVF.  +UA. Lungs clear on CXR, blood and urine cultures pending.  Pt remains afebrile.   - UCx grew E. coli resistant to Levoquin, but sensitive to other tested abx  - c/w zosyn (started 10/29)  - will d/c zosyn; E. coli from UCx sensitive to beta-lactam, covered by amoxicillin from triple therapy for H. pylori  - encourage PO water intake  - lactate downtrending

## 2019-11-02 NOTE — PROGRESS NOTE ADULT - ASSESSMENT
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  Admitted with SIRS possibly 2/2 PUD (H. pylori+ on previous EGD never started on triple therapy), vs choledocholithiasis vs. pancreatitis and acute blood loss anemia likely from PUD vs varices.  Course c/b E. coli bacteruria 79 yr old man w/ hx of GBS (1996), decompensated alcoholic cirrhosis (encephalopathy on lactulose), porcelain gallbladder, SMA stenosis, p/w 2 d hx of diffuse abdominal.  Admitted with SIRS possibly 2/2 PUD (H. pylori+ on previous EGD never started on triple therapy), vs choledocholithiasis vs. pancreatitis and acute blood loss anemia likely from PUD vs varices.  Course c/b E. coli bacteruria.  Currently on triple therapy for H. pylory found on previous EGD bx

## 2019-11-02 NOTE — PROGRESS NOTE ADULT - PROBLEM SELECTOR PLAN 10
DVT: pharm ppx on hold in setting of melena  Diet: clears at present; regular diet  Dispo: pending inpt rehab

## 2019-11-02 NOTE — PROGRESS NOTE ADULT - PROBLEM SELECTOR PLAN 2
Colicky upper abd pain +/- associated w/ food  - ddx includes PUD vs choledocholithiasis, pancreatitis, duodenitis, mesenteric ischemia  - CT A/P shows cholelithiasis, choledocholithiasis w/ CBD dilitation; neg pancreatitis    - given hematochezia this admission, c/f bleeding PUD  - s/p colonoscopy (10/31)  - low likelihood of ischemic abdominal pathology at this time  - per GI, can defer ERCP until GI bleed is addressed  - will start triple therapy for H. pylori Colicky upper abd pain +/- associated w/ food  - ddx includes PUD vs choledocholithiasis, pancreatitis, duodenitis, mesenteric ischemia  - CT A/P shows cholelithiasis, choledocholithiasis w/ CBD dilitation; neg pancreatitis    - given hematochezia this admission, c/f bleeding PUD  - s/p colonoscopy (10/31)  - low likelihood of ischemic abdominal pathology at this time  - per GI, can defer ERCP until GI bleed is addressed  - c/w triple therapy for H. pylori

## 2019-11-02 NOTE — PROGRESS NOTE ADULT - PROBLEM SELECTOR PLAN 1
possible variceal (less likely) vs PUD vs duodenal ulcer, known duodenitis and H. pylori from Sept 2019 surgical pathology that hasn't been treated  - given protonix 80 IVP x1, continue protonix 40mg IV BID  - H/H stable   - keep T/S active, transfuse for Hgb <8 or with symptoms or continued bleeding  - Hepatology consulted, recs appreciated  - GI consulted; s/p colonoscopy +/- EGD today possible variceal (less likely) vs PUD vs duodenal ulcer, known duodenitis and H. pylori from Sept 2019 surgical pathology that hasn't been treated  - given protonix 80 IVP x1, continue protonix 40mg IV BID  - H/H stable; CBC qD now   - keep T/S active, transfuse for Hgb <8 or with symptoms or continued bleeding  - Hepatology consulted, recs appreciated  - GI consulted; s/p colonoscopy w/ cauterization of AVM, polyp excision w/ bx; internal hemorrhoids observed, thought cause of recent bleed

## 2019-11-02 NOTE — PROGRESS NOTE ADULT - SUBJECTIVE AND OBJECTIVE BOX
Matt Rivas MD, PhD  PGY1  [phone] / [phone]    Interval Hx / Subjective:     MEDICATIONS  (STANDING):  influenza   Vaccine 0.5 milliLiter(s) IntraMuscular once  lactulose Syrup 20 Gram(s) Oral three times a day  octreotide  Infusion 50 MICROgram(s)/Hr (10 mL/Hr) IV Continuous <Continuous>  pantoprazole  Injectable 40 milliGRAM(s) IV Push two times a day  piperacillin/tazobactam IVPB.. 3.375 Gram(s) IV Intermittent every 8 hours  propranolol 10 milliGRAM(s) Oral two times a day    MEDICATIONS  (PRN):  acetaminophen  Tablet .. 650 milliGRAM(s) Oral every 6 hours PRN Mild Pain (1 - 3), Moderate Pain (4 - 6)    Objective:  Vital Signs Last 24 Hrs  T(C): 36.9 (02 Nov 2019 05:19), Max: 36.9 (02 Nov 2019 05:19)  T(F): 98.5 (02 Nov 2019 05:19), Max: 98.5 (02 Nov 2019 05:19)  HR: 59 (02 Nov 2019 05:19) (57 - 67)  BP: 122/75 (02 Nov 2019 05:19) (122/75 - 146/85)  BP(mean): --  RR: 16 (02 Nov 2019 05:19) (16 - 19)  SpO2: 100% (02 Nov 2019 05:19) (96% - 100%)    Physical Exam:  GEN: asleep in bed, NAD  CV: RRR, S1, S2; no M/R/G  PULM: LCTAB; not using accessory muscles  ABD: diffuse upper abd tenderness, less than previous; normal bowel sounds; non-distended, soft; no rebound, no guarding  Extremities: no clubbing, cyanosis; no edema;   NEURO: no FND        Culture - Urine (10.29.19 @ 04:08)    -  Trimethoprim/Sulfamethoxazole: S <=0.5/9.5 ONIEL    Culture - Urine:   COLONY COUNT: 10,000-49,000 CFU/mL    -  Tobramycin: S <=2 ONIEL    -  Tigecycline: S <=1 ONIEL    -  Piperacillin/Tazobactam: S <=8 ONIEL    -  Nitrofurantoin: S <=32 ONIEL    -  Meropenem: S <=1 ONIEL    -  Levofloxacin: R >4 ONILE    -  Imipenem: S <=1 ONIEL    -  Gentamicin: S <=1 ONIEL    -  Ertapenem: S <=0.5 ONIEL    -  Ceftriaxone: S <=1 ONIEL    -  Cefoxitin: S <=4 ONIEL    -  Ceftazidime: S <=1 ONIEL    -  Cefepime: S <=2 ONIEL    -  Cefazolin: S <=2 ONIEL    -  Aztreonam: S <=4 NOIEL    -  Ampicillin/Sulbactam: S <=4/2 ONIEL    -  Ampicillin: S <=2 ONIEL    -  Amikacin: S <=8 ONIEL    Specimen Source: URINE MIDSTREAM    Organism Identification: Escherichia coli    Organism: Escherichia coli    Method Type: NEGATIVE ONIEL 43 Matt Rivas MD, PhD  PGY1  [phone] / [phone]    Interval Hx / Subjective: no acute complaint o/n; eating only 1/2 his food; abd pain less than yesterday; no BM o/n, denied N/V, HA, CP, SOB, itching    MEDICATIONS  (STANDING):  influenza   Vaccine 0.5 milliLiter(s) IntraMuscular once  lactulose Syrup 20 Gram(s) Oral three times a day  octreotide  Infusion 50 MICROgram(s)/Hr (10 mL/Hr) IV Continuous <Continuous>  pantoprazole  Injectable 40 milliGRAM(s) IV Push two times a day  piperacillin/tazobactam IVPB.. 3.375 Gram(s) IV Intermittent every 8 hours  propranolol 10 milliGRAM(s) Oral two times a day    MEDICATIONS  (PRN):  acetaminophen  Tablet .. 650 milliGRAM(s) Oral every 6 hours PRN Mild Pain (1 - 3), Moderate Pain (4 - 6)    Objective:  Vital Signs Last 24 Hrs  T(C): 36.9 (02 Nov 2019 05:19), Max: 36.9 (02 Nov 2019 05:19)  T(F): 98.5 (02 Nov 2019 05:19), Max: 98.5 (02 Nov 2019 05:19)  HR: 59 (02 Nov 2019 05:19) (57 - 67)  BP: 122/75 (02 Nov 2019 05:19) (122/75 - 146/85)  BP(mean): --  RR: 16 (02 Nov 2019 05:19) (16 - 19)  SpO2: 100% (02 Nov 2019 05:19) (96% - 100%)    Physical Exam:  GEN: resting in bed, NAD  CV: RRR, S1, S2; no M/R/G  PULM: LCTAB; not using accessory muscles  ABD: diffuse upper abd tenderness; normal bowel sounds; non-distended, soft; no rebound, no guarding  Extremities: no clubbing, cyanosis; no edema;   NEURO: no FND    CBC Full  -  ( 02 Nov 2019 06:45 )  WBC Count : 9.89 K/uL  RBC Count : 5.11 M/uL  Hemoglobin : 12.9 g/dL  Hematocrit : 42.1 %  Platelet Count - Automated : 235 K/uL  Mean Cell Volume : 82.4 fL  Mean Cell Hemoglobin : 25.2 pg  Mean Cell Hemoglobin Concentration : 30.6 %  Auto Neutrophil # : x  Auto Lymphocyte # : x  Auto Monocyte # : x  Auto Eosinophil # : x  Auto Basophil # : x  Auto Neutrophil % : x  Auto Lymphocyte % : x  Auto Monocyte % : x  Auto Eosinophil % : x  Auto Basophil % : x    11-02    135  |  104  |  21  ----------------------------<  147<H>  4.4   |  19<L>  |  1.43<H>    Ca    8.7      02 Nov 2019 06:45  Phos  2.3     11-02  Mg     1.7     11-02    TPro  6.7  /  Alb  3.3  /  TBili  0.3  /  DBili  x   /  AST  22  /  ALT  24  /  AlkPhos  96  11-02

## 2019-11-02 NOTE — PROGRESS NOTE ADULT - PROBLEM SELECTOR PLAN 4
- GI will defer ERCP until GI bleeding has been addressed - GI will defer ERCP until GI bleeding has been addressed  - if pt still in hospital, then NPO after midnight tomorrow pending decision by GI on inpt ERCP

## 2019-11-02 NOTE — PROGRESS NOTE ADULT - PROBLEM SELECTOR PLAN 9
Decompensated ETOH Cirrhosis, previously on transplant list but no longer  MELD-Na 20. 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 No ascities on CT. 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  Liver lesion on MR concerning for HCC, repeat MR inconclusive.  - Mental status at baseline per wife, currently AOx3 (but states he has been getting forgetful with age)   - Trend MELD labs  - Propranolol 20 mg bid  - decrease lactulose to 10 G BID, per hepatology  - ammonia wnl Decompensated ETOH Cirrhosis, previously on transplant list but no longer  MELD-Na 20. 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 No ascities on CT. 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  Liver lesion on MR concerning for HCC, repeat MR inconclusive.  - Mental status at baseline per wife, currently AOx3 (but states he has been getting forgetful with age)   - Trend MELD labs  - Propranolol 20 mg bid  - no BM yesterday, will increase lactulose to 15 g BID  - ammonia wnl

## 2019-11-03 LAB
ANION GAP SERPL CALC-SCNC: 12 MMO/L — SIGNIFICANT CHANGE UP (ref 7–14)
BLD GP AB SCN SERPL QL: NEGATIVE — SIGNIFICANT CHANGE UP
BUN SERPL-MCNC: 19 MG/DL — SIGNIFICANT CHANGE UP (ref 7–23)
CALCIUM SERPL-MCNC: 8.8 MG/DL — SIGNIFICANT CHANGE UP (ref 8.4–10.5)
CHLORIDE SERPL-SCNC: 104 MMOL/L — SIGNIFICANT CHANGE UP (ref 98–107)
CO2 SERPL-SCNC: 21 MMOL/L — LOW (ref 22–31)
CREAT SERPL-MCNC: 1.35 MG/DL — HIGH (ref 0.5–1.3)
GLUCOSE SERPL-MCNC: 125 MG/DL — HIGH (ref 70–99)
HCT VFR BLD CALC: 38.6 % — LOW (ref 39–50)
HGB BLD-MCNC: 12.1 G/DL — LOW (ref 13–17)
INR BLD: 1.6 — HIGH (ref 0.88–1.17)
MAGNESIUM SERPL-MCNC: 1.7 MG/DL — SIGNIFICANT CHANGE UP (ref 1.6–2.6)
MCHC RBC-ENTMCNC: 25.5 PG — LOW (ref 27–34)
MCHC RBC-ENTMCNC: 31.3 % — LOW (ref 32–36)
MCV RBC AUTO: 81.4 FL — SIGNIFICANT CHANGE UP (ref 80–100)
NRBC # FLD: 0 K/UL — SIGNIFICANT CHANGE UP (ref 0–0)
PHOSPHATE SERPL-MCNC: 2.3 MG/DL — LOW (ref 2.5–4.5)
PLATELET # BLD AUTO: 202 K/UL — SIGNIFICANT CHANGE UP (ref 150–400)
PMV BLD: 10.2 FL — SIGNIFICANT CHANGE UP (ref 7–13)
POTASSIUM SERPL-MCNC: 3.8 MMOL/L — SIGNIFICANT CHANGE UP (ref 3.5–5.3)
POTASSIUM SERPL-SCNC: 3.8 MMOL/L — SIGNIFICANT CHANGE UP (ref 3.5–5.3)
PROTHROM AB SERPL-ACNC: 18 SEC — HIGH (ref 9.8–13.1)
RBC # BLD: 4.74 M/UL — SIGNIFICANT CHANGE UP (ref 4.2–5.8)
RBC # FLD: 16.9 % — HIGH (ref 10.3–14.5)
RH IG SCN BLD-IMP: POSITIVE — SIGNIFICANT CHANGE UP
SODIUM SERPL-SCNC: 137 MMOL/L — SIGNIFICANT CHANGE UP (ref 135–145)
WBC # BLD: 6.44 K/UL — SIGNIFICANT CHANGE UP (ref 3.8–10.5)
WBC # FLD AUTO: 6.44 K/UL — SIGNIFICANT CHANGE UP (ref 3.8–10.5)

## 2019-11-03 PROCEDURE — 99232 SBSQ HOSP IP/OBS MODERATE 35: CPT | Mod: GC

## 2019-11-03 RX ADMIN — Medication 1000 MILLIGRAM(S): at 17:00

## 2019-11-03 RX ADMIN — Medication 500 MILLIGRAM(S): at 05:22

## 2019-11-03 RX ADMIN — LACTULOSE 15 GRAM(S): 10 SOLUTION ORAL at 17:00

## 2019-11-03 RX ADMIN — PANTOPRAZOLE SODIUM 40 MILLIGRAM(S): 20 TABLET, DELAYED RELEASE ORAL at 05:04

## 2019-11-03 RX ADMIN — Medication 10 MILLIGRAM(S): at 17:00

## 2019-11-03 RX ADMIN — Medication 10 MILLIGRAM(S): at 05:04

## 2019-11-03 RX ADMIN — LACTULOSE 15 GRAM(S): 10 SOLUTION ORAL at 05:04

## 2019-11-03 RX ADMIN — PANTOPRAZOLE SODIUM 40 MILLIGRAM(S): 20 TABLET, DELAYED RELEASE ORAL at 17:00

## 2019-11-03 RX ADMIN — Medication 500 MILLIGRAM(S): at 18:22

## 2019-11-03 RX ADMIN — Medication 1000 MILLIGRAM(S): at 05:05

## 2019-11-03 NOTE — PROGRESS NOTE ADULT - PROBLEM SELECTOR PLAN 9
Decompensated ETOH Cirrhosis, previously on transplant list but no longer  MELD-Na 20. 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 No ascities on CT. 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  Liver lesion on MR concerning for HCC, repeat MR inconclusive.  - Mental status at baseline per wife, currently AOx3 (but states he has been getting forgetful with age)   - Trend MELD labs  - Propranolol 20 mg bid  - no BM yesterday, will increase lactulose to 15 g BID  - ammonia wnl

## 2019-11-03 NOTE — PROGRESS NOTE ADULT - SUBJECTIVE AND OBJECTIVE BOX
Matt Rivas MD, PhD  PGY1  [phone] / [phone]    Interval Hx / Subjective:     MEDICATIONS  (STANDING):  influenza   Vaccine 0.5 milliLiter(s) IntraMuscular once  lactulose Syrup 20 Gram(s) Oral three times a day  octreotide  Infusion 50 MICROgram(s)/Hr (10 mL/Hr) IV Continuous <Continuous>  pantoprazole  Injectable 40 milliGRAM(s) IV Push two times a day  piperacillin/tazobactam IVPB.. 3.375 Gram(s) IV Intermittent every 8 hours  propranolol 10 milliGRAM(s) Oral two times a day    MEDICATIONS  (PRN):  acetaminophen  Tablet .. 650 milliGRAM(s) Oral every 6 hours PRN Mild Pain (1 - 3), Moderate Pain (4 - 6)    Objective:  Vital Signs Last 24 Hrs  T(C): 36.9 (02 Nov 2019 05:19), Max: 36.9 (02 Nov 2019 05:19)  T(F): 98.5 (02 Nov 2019 05:19), Max: 98.5 (02 Nov 2019 05:19)  HR: 59 (02 Nov 2019 05:19) (57 - 67)  BP: 122/75 (02 Nov 2019 05:19) (122/75 - 146/85)  BP(mean): --  RR: 16 (02 Nov 2019 05:19) (16 - 19)  SpO2: 100% (02 Nov 2019 05:19) (96% - 100%)    Physical Exam:  GEN: resting in bed, NAD  CV: RRR, S1, S2; no M/R/G  PULM: LCTAB; not using accessory muscles  ABD: diffuse upper abd tenderness; normal bowel sounds; non-distended, soft; no rebound, no guarding  Extremities: no clubbing, cyanosis; no edema;   NEURO: no FND    CBC Full  -  ( 02 Nov 2019 06:45 )  WBC Count : 9.89 K/uL  RBC Count : 5.11 M/uL  Hemoglobin : 12.9 g/dL  Hematocrit : 42.1 %  Platelet Count - Automated : 235 K/uL  Mean Cell Volume : 82.4 fL  Mean Cell Hemoglobin : 25.2 pg  Mean Cell Hemoglobin Concentration : 30.6 %  Auto Neutrophil # : x  Auto Lymphocyte # : x  Auto Monocyte # : x  Auto Eosinophil # : x  Auto Basophil # : x  Auto Neutrophil % : x  Auto Lymphocyte % : x  Auto Monocyte % : x  Auto Eosinophil % : x  Auto Basophil % : x    11-02    135  |  104  |  21  ----------------------------<  147<H>  4.4   |  19<L>  |  1.43<H>    Ca    8.7      02 Nov 2019 06:45  Phos  2.3     11-02  Mg     1.7     11-02    TPro  6.7  /  Alb  3.3  /  TBili  0.3  /  DBili  x   /  AST  22  /  ALT  24  /  AlkPhos  96  11-02 Matt Rivas MD, PhD  PGY1  [phone] / [phone]    Interval Hx / Subjective: had 4 BMs yesterday, loose, non-bloody, non-dark; less abdominal pain than yesterday; still gassy; likely the food better than home; trying to drink more water; denied SOB, CP, lightheadedness, HA, racing heart, suprapubic pain, dysuria    MEDICATIONS  (STANDING):  influenza   Vaccine 0.5 milliLiter(s) IntraMuscular once  lactulose Syrup 20 Gram(s) Oral three times a day  octreotide  Infusion 50 MICROgram(s)/Hr (10 mL/Hr) IV Continuous <Continuous>  pantoprazole  Injectable 40 milliGRAM(s) IV Push two times a day  piperacillin/tazobactam IVPB.. 3.375 Gram(s) IV Intermittent every 8 hours  propranolol 10 milliGRAM(s) Oral two times a day    MEDICATIONS  (PRN):  acetaminophen  Tablet .. 650 milliGRAM(s) Oral every 6 hours PRN Mild Pain (1 - 3), Moderate Pain (4 - 6)    Objective:  Vital Signs Last 24 Hrs  T(C): 36.9 (03 Nov 2019 04:42), Max: 36.9 (03 Nov 2019 04:42)  T(F): 98.4 (03 Nov 2019 04:42), Max: 98.4 (03 Nov 2019 04:42)  HR: 68 (03 Nov 2019 04:50) (56 - 68)  BP: 140/71 (03 Nov 2019 04:42) (139/75 - 160/90)  BP(mean): --  RR: 18 (03 Nov 2019 04:42) (16 - 18)  SpO2: 100% (03 Nov 2019 04:42) (100% - 100%)    Physical Exam:  GEN: resting in bed, buried under blankets; NAD  CV: RRR, S1, S2; no M/R/G  PULM: LCTAB; not using accessory muscles  ABD: diffuse upper abd tenderness, less than yesterday; normal bowel sounds; non-distended, soft; no rebound, no guarding  Extremities: no clubbing, cyanosis; no edema;   NEURO: no FND               12.1   6.44  )-----------( 202      ( 11-03 @ 06:20 )             38.6                12.9   9.89  )-----------( 235      ( 11-02 @ 06:45 )             42.1                12.9   7.69  )-----------( 229      ( 11-01 @ 05:50 )             42.3                12.5   8.16  )-----------( 201      ( 10-31 @ 23:09 )             39.6     11-03    137  |  104  |  19  ----------------------------<  125<H>  3.8   |  21<L>  |  1.35<H>    Ca    8.8      03 Nov 2019 06:20  Phos  2.3     11-03  Mg     1.7     11-03    TPro  6.7  /  Alb  3.3  /  TBili  0.3  /  DBili  x   /  AST  22  /  ALT  24  /  AlkPhos  96  11-02    PT/INR - ( 03 Nov 2019 06:20 )   PT: 18.0 SEC;   INR: 1.60

## 2019-11-03 NOTE — PROGRESS NOTE ADULT - ASSESSMENT
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  Admitted with SIRS possibly 2/2 PUD (H. pylori+ on previous EGD never started on triple therapy), vs choledocholithiasis vs. pancreatitis and acute blood loss anemia likely from PUD vs varices.  Course c/b E. coli bacteruria.  Currently on triple therapy for H. pylory found on previous EGD bx

## 2019-11-03 NOTE — PROGRESS NOTE ADULT - PROBLEM SELECTOR PLAN 1
possible variceal (less likely) vs PUD vs duodenal ulcer, known duodenitis and H. pylori from Sept 2019 surgical pathology that hasn't been treated  - given protonix 80 IVP x1, continue protonix 40mg IV BID  - H/H stable; CBC qD now   - keep T/S active, transfuse for Hgb <8 or with symptoms or continued bleeding  - Hepatology consulted, recs appreciated  - GI consulted; s/p colonoscopy w/ cauterization of AVM, polyp excision w/ bx; internal hemorrhoids observed, thought cause of recent bleed

## 2019-11-03 NOTE — PROGRESS NOTE ADULT - PROBLEM SELECTOR PLAN 2
Colicky upper abd pain +/- associated w/ food  - ddx includes PUD vs choledocholithiasis, pancreatitis, duodenitis, mesenteric ischemia  - CT A/P shows cholelithiasis, choledocholithiasis w/ CBD dilitation; neg pancreatitis    - given hematochezia this admission, c/f bleeding PUD  - s/p colonoscopy (10/31)  - low likelihood of ischemic abdominal pathology at this time  - per GI, can defer ERCP until GI bleed is addressed  - c/w triple therapy for H. pylori

## 2019-11-03 NOTE — PROGRESS NOTE ADULT - NSHPATTENDINGPLANDISCUSS_GEN_ALL_CORE
patient, HS3
patient, HS3, son-in-law at bedside

## 2019-11-04 LAB
ALBUMIN SERPL ELPH-MCNC: 3.1 G/DL — LOW (ref 3.3–5)
ALP SERPL-CCNC: 81 U/L — SIGNIFICANT CHANGE UP (ref 40–120)
ALT FLD-CCNC: 17 U/L — SIGNIFICANT CHANGE UP (ref 4–41)
ANION GAP SERPL CALC-SCNC: 13 MMO/L — SIGNIFICANT CHANGE UP (ref 7–14)
APTT BLD: 31.5 SEC — SIGNIFICANT CHANGE UP (ref 27.5–36.3)
AST SERPL-CCNC: 18 U/L — SIGNIFICANT CHANGE UP (ref 4–40)
BILIRUB SERPL-MCNC: 0.4 MG/DL — SIGNIFICANT CHANGE UP (ref 0.2–1.2)
BLD GP AB SCN SERPL QL: NEGATIVE — SIGNIFICANT CHANGE UP
BUN SERPL-MCNC: 23 MG/DL — SIGNIFICANT CHANGE UP (ref 7–23)
CALCIUM SERPL-MCNC: 8.9 MG/DL — SIGNIFICANT CHANGE UP (ref 8.4–10.5)
CHLORIDE SERPL-SCNC: 105 MMOL/L — SIGNIFICANT CHANGE UP (ref 98–107)
CO2 SERPL-SCNC: 20 MMOL/L — LOW (ref 22–31)
CREAT SERPL-MCNC: 1.47 MG/DL — HIGH (ref 0.5–1.3)
GLUCOSE SERPL-MCNC: 126 MG/DL — HIGH (ref 70–99)
HCT VFR BLD CALC: 36.5 % — LOW (ref 39–50)
HGB BLD-MCNC: 11.5 G/DL — LOW (ref 13–17)
INR BLD: 1.58 — HIGH (ref 0.88–1.17)
MAGNESIUM SERPL-MCNC: 1.7 MG/DL — SIGNIFICANT CHANGE UP (ref 1.6–2.6)
MCHC RBC-ENTMCNC: 25.4 PG — LOW (ref 27–34)
MCHC RBC-ENTMCNC: 31.5 % — LOW (ref 32–36)
MCV RBC AUTO: 80.6 FL — SIGNIFICANT CHANGE UP (ref 80–100)
NRBC # FLD: 0 K/UL — SIGNIFICANT CHANGE UP (ref 0–0)
PHOSPHATE SERPL-MCNC: 2.2 MG/DL — LOW (ref 2.5–4.5)
PLATELET # BLD AUTO: 180 K/UL — SIGNIFICANT CHANGE UP (ref 150–400)
PMV BLD: 9.9 FL — SIGNIFICANT CHANGE UP (ref 7–13)
POTASSIUM SERPL-MCNC: 4.1 MMOL/L — SIGNIFICANT CHANGE UP (ref 3.5–5.3)
POTASSIUM SERPL-SCNC: 4.1 MMOL/L — SIGNIFICANT CHANGE UP (ref 3.5–5.3)
PROT SERPL-MCNC: 6.5 G/DL — SIGNIFICANT CHANGE UP (ref 6–8.3)
PROTHROM AB SERPL-ACNC: 18.3 SEC — HIGH (ref 9.8–13.1)
RBC # BLD: 4.53 M/UL — SIGNIFICANT CHANGE UP (ref 4.2–5.8)
RBC # FLD: 16.9 % — HIGH (ref 10.3–14.5)
RH IG SCN BLD-IMP: POSITIVE — SIGNIFICANT CHANGE UP
SODIUM SERPL-SCNC: 138 MMOL/L — SIGNIFICANT CHANGE UP (ref 135–145)
WBC # BLD: 7.13 K/UL — SIGNIFICANT CHANGE UP (ref 3.8–10.5)
WBC # FLD AUTO: 7.13 K/UL — SIGNIFICANT CHANGE UP (ref 3.8–10.5)

## 2019-11-04 PROCEDURE — 99232 SBSQ HOSP IP/OBS MODERATE 35: CPT | Mod: GC

## 2019-11-04 RX ORDER — SODIUM,POTASSIUM PHOSPHATES 278-250MG
1 POWDER IN PACKET (EA) ORAL ONCE
Refills: 0 | Status: COMPLETED | OUTPATIENT
Start: 2019-11-04 | End: 2019-11-04

## 2019-11-04 RX ADMIN — Medication 500 MILLIGRAM(S): at 17:46

## 2019-11-04 RX ADMIN — Medication 1000 MILLIGRAM(S): at 06:10

## 2019-11-04 RX ADMIN — Medication 10 MILLIGRAM(S): at 06:10

## 2019-11-04 RX ADMIN — PANTOPRAZOLE SODIUM 40 MILLIGRAM(S): 20 TABLET, DELAYED RELEASE ORAL at 17:46

## 2019-11-04 RX ADMIN — LACTULOSE 15 GRAM(S): 10 SOLUTION ORAL at 06:11

## 2019-11-04 RX ADMIN — LACTULOSE 15 GRAM(S): 10 SOLUTION ORAL at 17:46

## 2019-11-04 RX ADMIN — Medication 10 MILLIGRAM(S): at 17:46

## 2019-11-04 RX ADMIN — PANTOPRAZOLE SODIUM 40 MILLIGRAM(S): 20 TABLET, DELAYED RELEASE ORAL at 06:10

## 2019-11-04 RX ADMIN — Medication 500 MILLIGRAM(S): at 06:40

## 2019-11-04 RX ADMIN — Medication 1 PACKET(S): at 12:02

## 2019-11-04 RX ADMIN — Medication 1000 MILLIGRAM(S): at 17:46

## 2019-11-04 NOTE — MEDICAL STUDENT PROGRESS NOTE(EDUCATION) - NS MD HP STUD ASPLAN PLAN FT
#GI Bleed: Pt is hemodynamically stable and pt reporting fewer symptoms. Denies blood in stool overnight. Hgb/Hct stable on morning labs. Source of bleed possibly due to PUD 2/2 H. pylori infection vs esophageal varices.  -Colonoscopy revealed multiple non-bleeding angioectasis, 5mm sigmoid polyp, rectal varices, and internal hemorrhoids likely source of GI bleed.  -C/w triple therapy (pantoprazole 40 mg BID-amoxicillin 1g BID-clarithromycin 500 mg BID) on 11/1. Day 4 of 14 days for H. pylori infection.  -Restart normal diet per GI recs.    #Abdominal pain: Etiology of abdominal pain is unclear. Pain is resolving since hospital admission. Peptic ulcer 2/2 duodenal inflammation from H. pylori infection is possible. Recent history significant for untreated positive H. pylori culture on recent endoscopy. Pt has a history of chronic choledocholithiasis no symptoms of cholangitis  -S/p colonoscopy.  -Continue Tylenol max 2g daily for pain control  -F/u with GI re inpatient ERCP to address choledocholithiasis. NPO after midnight Sunday.    #UTI: Leukocytosis,  on admission with downtrending lactate 4.4>3.5>2.6. Etiology of infection likely urinary tract infection. Leukocytosis has resolved. VS stable and WNL. Urine culture positive for E. coli.  -C/w triple therapy which include abx E. coli coverage  -Encourage PO intake of fluids  -Blood cultures obtained on 10/29. Negative to date.     #CKD: Stage 3 CKD per outpatient chart. Basline Cr 1.2-1.5. Current SCr 1.47.  -Stable. Monitor BMP qd  -Avoid possible nephrotoxic medications.    #Liver cirrhosis: H/o decompensated alcoholic cirrhosis with esophageal varices (May 2018) and encephalopathy on lactulose.  -Increased actulose to 15 g BID. Pt having 3-4 BMs per day.  -Monitor mental status  -Trend MELD-Na labs- MELD-Na 16 today  -Continue with propranolol 10 mg BID    #Prophylactic measures  DVT: IMPROVE score of 1 with bleeding risk. Pharm prophylaxis held.   Diet: Diet, low fat. Currently NPO pending ERCP. F/u GI on timing.  Disposition: Pending inpatient rehab placement.

## 2019-11-04 NOTE — PROGRESS NOTE ADULT - SUBJECTIVE AND OBJECTIVE BOX
GI HPI Today:  Patient is a 79y old  Male who presents with a chief complaint of Abdominal Pain (04 Nov 2019 05:46)    Hepatology following the patient for rectal bleeding , currently resolved   Interval Events: Pt s/p colonoscopy last week. Colonoscopy revealed three nonbleeding colonic angioectasias that were treated with APC, 5mm sigmoid polyp which was removed, also rectal varix and hemorrhoids.   Hgb now stable . Abd pain resolved . No blood transfusions overnight.       PAST MEDICAL & SURGICAL HISTORY  Liver cirrhosis  Guillain-Salem syndrome  HTN (hypertension)  H/O inguinal hernia repair      ALLERGIES:  Allergy Status Unknown  No Known Allergies      MEDICATIONS:  MEDICATIONS  (STANDING):  amoxicillin 1000 milliGRAM(s) Oral two times a day  clarithromycin 500 milliGRAM(s) Oral two times a day  influenza   Vaccine 0.5 milliLiter(s) IntraMuscular once  lactulose Syrup 15 Gram(s) Oral two times a day  pantoprazole    Tablet 40 milliGRAM(s) Oral two times a day  potassium phosphate / sodium phosphate powder 1 Packet(s) Oral once  propranolol 10 milliGRAM(s) Oral two times a day    MEDICATIONS  (PRN):  acetaminophen   Tablet .. 650 milliGRAM(s) Oral every 6 hours PRN Mild Pain (1 - 3), Moderate Pain (4 - 6)      REVIEW OF SYSTEMS  General:  No fevers  Eyes:  No reported pain   ENT:  No sore throat   NECK: No stiffness   CV:  No chest pain   Resp:  No shortness of breath  GI:  See HPI  :  No dysuria  Muscle:  No weakness  Neuro:  No tingling  Endocrine:  No polyuria  Heme:  No ecchymosis        VITALS:   T(F): 98 (11-04 @ 04:28), Max: 98.5 (11-02 @ 05:19)  HR: 62 (11-04 @ 06:00) (51 - 68)  BP: 130/65 (11-04 @ 06:00) (120/87 - 160/90)  BP(mean): --  RR: 18 (11-04 @ 06:00) (16 - 19)  SpO2: 100% (11-04 @ 06:00) (96% - 100%)        PHYSICAL EXAM:    GENERAL:  Appears stated age, well-groomed, well-nourished, no distress  EYES: No scleral icterus   LUNG: Clear to auscultation bilaterally;   HEART: s1 and s2 heard   ABDOMEN: Soft, +BS, no abd distension, no Abdominal Tenderness, No guarding, No Garduno Sign   Neuro: AAO x3 , no asterix        Blood Work :                        11.5   7.13  )-----------( 180      ( 04 Nov 2019 05:46 )             36.5     PT/INR - ( 04 Nov 2019 05:46 )  INR: 1.58          PTT - ( 04 Nov 2019 05:46 )  PTT:31.5   11-04    138  |  105  |  23  ----------------------------<  126<H>  4.1   |  20<L>  |  1.47<H>    Ca    8.9      04 Nov 2019 05:46  Phos  2.2     11-04  Mg     1.7     11-04      CBC -  ( 04 Nov 2019 05:46 )  Hemoglobin : 11.5    CBC -  ( 03 Nov 2019 06:20 )  Hemoglobin : 12.1    CBC -  ( 02 Nov 2019 06:45 )  Hemoglobin : 12.9    CBC -  ( 01 Nov 2019 05:50 )  Hemoglobin : 12.9    CBC -  ( 31 Oct 2019 23:09 )  Hemoglobin : 12.5      LIVER FUNCTIONS - ( 04 Nov 2019 05:46 )  Alb: 3.1 [3.3 - 5.0] / Pro: 6.5 [6.0 - 8.3] / ALK PHOS: 81 [40 - 120] / ALT: 17 [4 - 41] / AST: 18 [4 - 40] / GGT: x     LIVER FUNCTIONS - ( 02 Nov 2019 06:45 )  Alb: 3.3 [3.3 - 5.0] / Pro: 6.7 [6.0 - 8.3] / ALK PHOS: 96 [40 - 120] / ALT: 24 [4 - 41] / AST: 22 [4 - 40] / GGT: x     LIVER FUNCTIONS - ( 01 Nov 2019 05:50 )  Alb: 3.1 [3.3 - 5.0] / Pro: 6.5 [6.0 - 8.3] / ALK PHOS: 92 [40 - 120] / ALT: 26 [4 - 41] / AST: 32 [4 - 40] / GGT: x     LIVER FUNCTIONS - ( 31 Oct 2019 05:15 )  Alb: 3.2 [3.3 - 5.0] / Pro: 6.9 [6.0 - 8.3] / ALK PHOS: 104 [40 - 120] / ALT: 31 [4 - 41] / AST: 47 [4 - 40] / GGT: x     LIVER FUNCTIONS - ( 30 Oct 2019 06:30 )  Alb: 3.3 [3.3 - 5.0] / Pro: 7.2 [6.0 - 8.3] / ALK PHOS: 105 [40 - 120] / ALT: 23 [4 - 41] / AST: 32 [4 - 40] / GGT: x     LIVER FUNCTIONS - ( 29 Oct 2019 06:50 )  Alb: 3.4 [3.3 - 5.0] / Pro: 7.3 [6.0 - 8.3] / ALK PHOS: 119 [40 - 120] / ALT: 21 [4 - 41] / AST: 28 [4 - 40] / GGT: x     LIVER FUNCTIONS - ( 28 Oct 2019 11:42 )  Alb: 3.6 [3.3 - 5.0] / Pro: 8.2 [6.0 - 8.3] / ALK PHOS: 147 [40 - 120] / ALT: 27 [4 - 41] / AST: 43 [4 - 40] / GGT: x         RADIOLOGY:    < from: US Abdomen Limited (10.28.19 @ 18:05) >  FINDINGS:    Limited examination secondary to bowel gas    Liver: Redemonstration of cirrhotic morphology.    Bile ducts: Normal caliber. Common bile duct measures 6 mm.     Gallbladder: Poorly evaluated . Negative Garduno's sign.    Pancreas: Not visualized.    Right kidney: 8.6 cm. No hydronephrosis.    Ascites: None.    IVC: Visualized portions are within normal limits.    IMPRESSION:     Redemonstration of cirrhotic morphology of the liver.    The gallbladder is poorly evaluated on ultrasound, may be related to   known porcelain gallbladder.     < end of copied text >    < from: CT Abdomen and Pelvis w/ Oral Cont and w/ IV Cont (10.28.19 @ 14:20) >  IMPRESSION:     Cholelithiasis and porcelain gallbladder are again noted.    Choledocholithiasis, with mild dilatation of the common bile duct.     Cirrhotic configuration of the liver.    Minimal infiltration of the fat adjacent to the pancreatic head and   proximal duodenum, raising consideration of mild pancreatitis and/or   duodenitis..    < end of copied text >

## 2019-11-04 NOTE — PROGRESS NOTE ADULT - PROBLEM SELECTOR PLAN 6
Chronic, likely in setting of choledocholithiasis Cr baseline ~ 1.2-1.5  - stable, within baseline, Cr rising likely from dehydration and melena, IVF were given on 10/30. Will continue to trend

## 2019-11-04 NOTE — MEDICAL STUDENT PROGRESS NOTE(EDUCATION) - SUBJECTIVE AND OBJECTIVE BOX
KETAN TIPTON  79y  Male      Patient is a 79y old  Male who presents with a chief complaint of Abdominal Pain (04 Nov 2019 05:46)    Subjective:  INTERVAL HPI/OVERNIGHT EVENTS: No acute events overnight. Patient interviewed and examined at bedside. Patient reports improved abdominal pain from yesterday. States he is still having bowel movements, non bloody. Unable to walk around as much as he would like because he feels weak. States he has an appetite but does not like the food. Denies fever, chills, nausea, vomiting, SOB, CP, sore throat.    HOSPITAL MEDICATIONS  acetaminophen   Tablet .. 650 milliGRAM(s) Oral every 6 hours PRN  amoxicillin 1000 milliGRAM(s) Oral two times a day  clarithromycin 500 milliGRAM(s) Oral two times a day  influenza   Vaccine 0.5 milliLiter(s) IntraMuscular once  lactulose Syrup 15 Gram(s) Oral two times a day  pantoprazole    Tablet 40 milliGRAM(s) Oral two times a day  propranolol 10 milliGRAM(s) Oral two times a day      Objective:  Vital Signs Last 24 Hrs  T(C): 36.7 (04 Nov 2019 04:28), Max: 36.7 (03 Nov 2019 13:51)  T(F): 98 (04 Nov 2019 04:28), Max: 98 (03 Nov 2019 13:51)  HR: 60 (04 Nov 2019 04:28) (53 - 63)  BP: 127/63 (04 Nov 2019 04:28) (120/87 - 142/77)  BP(mean): --  RR: 16 (04 Nov 2019 04:28) (16 - 17)  SpO2: 100% (04 Nov 2019 04:28) (100% - 100%)      PHYSICAL EXAM:  GENERAL: Pleasant older gentleman lying comfortable in bed in NAD  HEAD:  Atraumatic, Normocephalic  EYES: Conjunctiva clear  ENMT: No tonsillar erythema, exudates, or enlargement; Moist mucous membranes.  NECK: Supple, No JVD, No thyromegaly.  NERVOUS SYSTEM:  Alert & Oriented X3.  CHEST/LUNG: Clear to auscultation bilaterally; No rales, rhonchi, wheezing.   HEART: Regular rate and rhythm; No murmurs, rubs, or gallops  ABDOMEN: Soft, Diffuse mild tenderness to palpation in UQs, Nondistended; Bowel sounds normoactive.  EXTREMITIES:  2+ Peripheral Pulses, No clubbing, cyanosis, or edema  LYMPH: No lymphadenopathy appreciated.  SKIN: No rashes or lesions    LABS:                        11.5   7.13  )-----------( 180      ( 04 Nov 2019 05:46 )             36.5     11-04    138  |  105  |  23  ----------------------------<  126<H>  4.1   |  20<L>  |  1.47<H>    Ca    8.9      04 Nov 2019 05:46  Phos  2.2     11-04  Mg     1.7     11-04    TPro  6.5  /  Alb  3.1<L>  /  TBili  0.4  /  DBili  x   /  AST  18  /  ALT  17  /  AlkPhos  81  11-04    PT/INR - ( 04 Nov 2019 05:46 )   PT: 18.3 SEC;   INR: 1.58          PTT - ( 04 Nov 2019 05:46 )  PTT:31.5 SEC    79y  Male

## 2019-11-04 NOTE — PROGRESS NOTE ADULT - PROBLEM SELECTOR PLAN 5
Elevated lactate on presentation, concern for infection vs mesenteric ischemia    - encourage PO intake  - not trending lactate Chronic, likely in setting of choledocholithiasis

## 2019-11-04 NOTE — PROGRESS NOTE ADULT - SUBJECTIVE AND OBJECTIVE BOX
Chief Complaint:  Patient is a 79y old  Male who presents with a chief complaint of Abdominal Pain (04 Nov 2019 05:46)      Interval Events: No adverse events over weekend    Allergies:  Allergy Status Unknown  No Known Allergies      Hospital Medications:  acetaminophen   Tablet .. 650 milliGRAM(s) Oral every 6 hours PRN  amoxicillin 1000 milliGRAM(s) Oral two times a day  clarithromycin 500 milliGRAM(s) Oral two times a day  influenza   Vaccine 0.5 milliLiter(s) IntraMuscular once  lactulose Syrup 15 Gram(s) Oral two times a day  pantoprazole    Tablet 40 milliGRAM(s) Oral two times a day  potassium phosphate / sodium phosphate powder 1 Packet(s) Oral once  propranolol 10 milliGRAM(s) Oral two times a day      PMHX/PSHX:  Liver cirrhosis  Guillain-Fannin syndrome  HTN (hypertension)  H/O inguinal hernia repair      Family history:  Family history of ovarian cancer (Sibling)      ROS:     General:  No wt loss, fevers, chills, night sweats, fatigue,   Eyes:  Good vision, no reported pain  ENT:  No sore throat, pain, runny nose, dysphagia  CV:  No pain, palpitations, hypo/hypertension  Resp:  No dyspnea, cough, tachypnea, wheezing  GI:  See HPI  :  No pain, bleeding, incontinence, nocturia  Muscle:  No pain, weakness  Neuro:  No weakness, tingling, memory problems  Psych:  No fatigue, insomnia, mood problems, depression  Endocrine:  No polyuria, polydipsia, cold/heat intolerance  Heme:  No petechiae, ecchymosis, easy bruisability  Skin:  No rash, edema      PHYSICAL EXAM:     GENERAL: NAD  HEENT:  NC/AT  CHEST:  Full & symmetric excursion, no increased effort  ABDOMEN:  Soft, non-tender, non-distended, +BS  EXTREMITIES:  no edema  SKIN:  No rash  NEURO:  Alert      Vital Signs:  Vital Signs Last 24 Hrs  T(C): 36.7 (04 Nov 2019 04:28), Max: 36.7 (03 Nov 2019 13:51)  T(F): 98 (04 Nov 2019 04:28), Max: 98 (03 Nov 2019 13:51)  HR: 62 (04 Nov 2019 06:00) (53 - 63)  BP: 130/65 (04 Nov 2019 06:00) (120/87 - 142/77)  BP(mean): --  RR: 18 (04 Nov 2019 06:00) (16 - 18)  SpO2: 100% (04 Nov 2019 06:00) (100% - 100%)  Daily     Daily     LABS:                        11.5   7.13  )-----------( 180      ( 04 Nov 2019 05:46 )             36.5     11-04    138  |  105  |  23  ----------------------------<  126<H>  4.1   |  20<L>  |  1.47<H>    Ca    8.9      04 Nov 2019 05:46  Phos  2.2     11-04  Mg     1.7     11-04    TPro  6.5  /  Alb  3.1<L>  /  TBili  0.4  /  DBili  x   /  AST  18  /  ALT  17  /  AlkPhos  81  11-04    LIVER FUNCTIONS - ( 04 Nov 2019 05:46 )  Alb: 3.1 g/dL / Pro: 6.5 g/dL / ALK PHOS: 81 u/L / ALT: 17 u/L / AST: 18 u/L / GGT: x           PT/INR - ( 04 Nov 2019 05:46 )   PT: 18.3 SEC;   INR: 1.58          PTT - ( 04 Nov 2019 05:46 )  PTT:31.5 SEC        Imaging:  reviewed

## 2019-11-04 NOTE — PROGRESS NOTE ADULT - PROBLEM SELECTOR PLAN 9
Decompensated ETOH Cirrhosis, previously on transplant list but no longer  MELD-Na 20. 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 No ascities on CT. 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  Liver lesion on MR concerning for HCC, repeat MR inconclusive.  - Mental status at baseline per wife, currently AOx3 (but states he has been getting forgetful with age)   - Trend MELD labs  - Propranolol 20 mg bid  - no BM yesterday, will increase lactulose to 15 g BID  - ammonia wnl DVT: pharm ppx on hold in setting of melena  Diet: clears at present; regular diet  Dispo: pending subacute rehab

## 2019-11-04 NOTE — PROGRESS NOTE ADULT - PROBLEM SELECTOR PLAN 8
C/w propranolol Decompensated ETOH Cirrhosis, previously on transplant list but no longer  MELD-Na 20. 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 No ascities on CT. 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  Liver lesion on MR concerning for HCC, repeat MR inconclusive, being actively followed as outpatient.   - Mental status at baseline per wife, currently AOx3 (but states he has been getting forgetful with age)   - Trend MELD labs  - Propranolol 20 mg bid  - no BM yesterday, will increase lactulose to 15 g BID  - ammonia wnl

## 2019-11-04 NOTE — PROGRESS NOTE ADULT - ASSESSMENT
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  Admitted with SIRS possibly 2/2 PUD (H. pylori+ on previous EGD never started on triple therapy), vs choledocholithiasis vs. pancreatitis and acute blood loss anemia likely from PUD vs varices.  Course c/b E. coli bacteruria.  Currently on triple therapy for H. pylory found on previous EGD bx 79 yr old man w/ hx of GBS (1996), decompensated alcoholic cirrhosis (encephalopathy on lactulose), porcelain gallbladder, SMA stenosis, p/w 2 d hx of diffuse abdominal.  Admitted with SIRS possibly 2/2 PUD (H. pylori+ on previous EGD never started on triple therapy), vs choledocholithiasis vs. pancreatitis and acute blood loss anemia likely from PUD vs varices.  Course c/b assymptomatic E. coli bacteruria.  Currently on triple therapy for H. pylory found on previous EGD bx, day 3 of 14. 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  Admitted with SIRS possibly 2/2 PUD (H. pylori+ on previous EGD never started on triple therapy), vs choledocholithiasis vs. pancreatitis and acute blood loss anemia likely from PUD vs varices.  Course c/b asymptomatic E. coli bacteruria.  Currently on triple therapy for H. pylori found on previous EGD bx, day 3 of 14.

## 2019-11-04 NOTE — PROGRESS NOTE ADULT - PROBLEM SELECTOR PLAN 2
Colicky upper abd pain +/- associated w/ food  - ddx includes PUD vs choledocholithiasis, pancreatitis, duodenitis, mesenteric ischemia  - CT A/P shows cholelithiasis, choledocholithiasis w/ CBD dilitation; neg pancreatitis    - given hematochezia this admission, c/f bleeding PUD  - s/p colonoscopy (10/31)  - low likelihood of ischemic abdominal pathology at this time  - per GI, can defer ERCP until GI bleed is addressed  - c/w triple therapy for H. pylori Colicky upper abd pain +/- associated w/ food  - ddx includes PUD vs choledocholithiasis, pancreatitis, duodenitis, mesenteric ischemia  - CT A/P shows cholelithiasis, choledocholithiasis w/ CBD dilitation; neg pancreatitis    - given hematochezia this admission, c/f bleeding PUD  - s/p colonoscopy (10/31)  - low likelihood of ischemic abdominal pathology at this time  - per GI, can defer ERCP until GI bleed is addressed  - c/w triple therapy for H. pylori, day 3 of 14 - After discussion with GI, source likely hemorrhoidal bleed. Less likely sources are variceal vs PUD vs duodenal ulcer, known duodenitis and H. pylori from Sept 2019 surgical pathology that hasn't been treated.    - H/H stable; CBC qD now   - keep T/S active, transfuse for Hgb <8 or with symptoms or continued bleeding  - Hepatology consulted, recs appreciated

## 2019-11-04 NOTE — MEDICAL STUDENT PROGRESS NOTE(EDUCATION) - NS MD HP STUD ASPLAN ASSES FT
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

## 2019-11-04 NOTE — PROGRESS NOTE ADULT - SUBJECTIVE AND OBJECTIVE BOX
Matt Rivas MD, PhD  PGY1  [phone] / [phone]    Interval Hx / Subjective:     MEDICATIONS  (STANDING):  influenza   Vaccine 0.5 milliLiter(s) IntraMuscular once  lactulose Syrup 20 Gram(s) Oral three times a day  octreotide  Infusion 50 MICROgram(s)/Hr (10 mL/Hr) IV Continuous <Continuous>  pantoprazole  Injectable 40 milliGRAM(s) IV Push two times a day  piperacillin/tazobactam IVPB.. 3.375 Gram(s) IV Intermittent every 8 hours  propranolol 10 milliGRAM(s) Oral two times a day    MEDICATIONS  (PRN):  acetaminophen  Tablet .. 650 milliGRAM(s) Oral every 6 hours PRN Mild Pain (1 - 3), Moderate Pain (4 - 6)    Objective:  Vital Signs Last 24 Hrs  T(C): 36.9 (03 Nov 2019 04:42), Max: 36.9 (03 Nov 2019 04:42)  T(F): 98.4 (03 Nov 2019 04:42), Max: 98.4 (03 Nov 2019 04:42)  HR: 68 (03 Nov 2019 04:50) (56 - 68)  BP: 140/71 (03 Nov 2019 04:42) (139/75 - 160/90)  BP(mean): --  RR: 18 (03 Nov 2019 04:42) (16 - 18)  SpO2: 100% (03 Nov 2019 04:42) (100% - 100%)    Physical Exam:  GEN: resting in bed, buried under blankets; NAD  CV: RRR, S1, S2; no M/R/G  PULM: LCTAB; not using accessory muscles  ABD: diffuse upper abd tenderness, less than yesterday; normal bowel sounds; non-distended, soft; no rebound, no guarding  Extremities: no clubbing, cyanosis; no edema;   NEURO: no FND               12.1   6.44  )-----------( 202      ( 11-03 @ 06:20 )             38.6                12.9   9.89  )-----------( 235      ( 11-02 @ 06:45 )             42.1                12.9   7.69  )-----------( 229      ( 11-01 @ 05:50 )             42.3                12.5   8.16  )-----------( 201      ( 10-31 @ 23:09 )             39.6     11-03    137  |  104  |  19  ----------------------------<  125<H>  3.8   |  21<L>  |  1.35<H>    Ca    8.8      03 Nov 2019 06:20  Phos  2.3     11-03  Mg     1.7     11-03    TPro  6.7  /  Alb  3.3  /  TBili  0.3  /  DBili  x   /  AST  22  /  ALT  24  /  AlkPhos  96  11-02    PT/INR - ( 03 Nov 2019 06:20 )   PT: 18.0 SEC;   INR: 1.60 Matt Rivas MD, PhD  PGY1  [phone] / [phone]    Interval Hx / Subjective: no acute issue o/n; had 4 loose BM, non-dark, non-bloody; endorses gassiness; complains about hospital food but prefers it to meals brought in by fam; denied HA, lightheadedness, SOB, CP, dysuria, leg pain.     MEDICATIONS  (STANDING):  influenza   Vaccine 0.5 milliLiter(s) IntraMuscular once  lactulose Syrup 20 Gram(s) Oral three times a day  octreotide  Infusion 50 MICROgram(s)/Hr (10 mL/Hr) IV Continuous <Continuous>  pantoprazole  Injectable 40 milliGRAM(s) IV Push two times a day  piperacillin/tazobactam IVPB.. 3.375 Gram(s) IV Intermittent every 8 hours  propranolol 10 milliGRAM(s) Oral two times a day    MEDICATIONS  (PRN):  acetaminophen  Tablet .. 650 milliGRAM(s) Oral every 6 hours PRN Mild Pain (1 - 3), Moderate Pain (4 - 6)    Objective:  Vital Signs Last 24 Hrs  T(C): 36.7 (04 Nov 2019 04:28), Max: 36.7 (03 Nov 2019 13:51)  T(F): 98 (04 Nov 2019 04:28), Max: 98 (03 Nov 2019 13:51)  HR: 62 (04 Nov 2019 06:00) (53 - 63)  BP: 130/65 (04 Nov 2019 06:00) (120/87 - 142/77)  BP(mean): --  RR: 18 (04 Nov 2019 06:00) (16 - 18)  SpO2: 100% (04 Nov 2019 06:00) (100% - 100%)    Physical Exam:  GEN: resting in bed, buried under blankets; NAD  CV: RRR, S1, S2; no M/R/G  PULM: LCTAB; not using accessory muscles  ABD: diffuse upper abd tenderness, same as yesterday; normal bowel sounds; non-distended, soft; no rebound, no guarding  Extremities: no clubbing, cyanosis; no edema  NEURO: no FND               11.5   7.13  )-----------( 180      ( 11-04 @ 05:46 )             36.5                12.1   6.44  )-----------( 202      ( 11-03 @ 06:20 )             38.6                12.9   9.89  )-----------( 235      ( 11-02 @ 06:45 )             42.1     11-04    138  |  105  |  23  ----------------------------<  126<H>  4.1   |  20<L>  |  1.47<H>    Ca    8.9      04 Nov 2019 05:46  Phos  2.2     11-04  Mg     1.7     11-04    TPro  6.5  /  Alb  3.1<L>  /  TBili  0.4  /  DBili  x   /  AST  18  /  ALT  17  /  AlkPhos  81  11-04

## 2019-11-04 NOTE — PROGRESS NOTE ADULT - PROBLEM SELECTOR PLAN 1
possible variceal (less likely) vs PUD vs duodenal ulcer, known duodenitis and H. pylori from Sept 2019 surgical pathology that hasn't been treated  - given protonix 80 IVP x1, continue protonix 40mg IV BID  - H/H stable; CBC qD now   - keep T/S active, transfuse for Hgb <8 or with symptoms or continued bleeding  - Hepatology consulted, recs appreciated  - GI consulted; s/p colonoscopy w/ cauterization of AVM, polyp excision w/ bx; internal hemorrhoids observed, thought cause of recent bleed - Colicky upper abd pain +/- associated w/ food  - ddx includes PUD vs choledocholithiasis, pancreatitis, duodenitis, mesenteric ischemia  - CT A/P shows cholelithiasis, choledocholithiasis w/ CBD dilitation; neg pancreatitis    - given hematochezia this admission, c/f bleeding PUD  - s/p colonoscopy (10/31)  - low likelihood of ischemic abdominal pathology at this time  - Plan for ERCP tomorrow for stone extraction   - c/w triple therapy for H. pylori, day 3 of 14

## 2019-11-04 NOTE — PROGRESS NOTE ADULT - ASSESSMENT
Impression:  79 year old man from home, PMH of [facility] (1996, hospitalized for 4 months), decompensated alcoholic cirrhosis (varices: 5/27/18; no ascites per CT; encephalopathy: on lactulose; ?HCC: concerning lesion seen on MR; off transplant list 2/2 age), porcelain gallbladder, SMA stenosis, s/p b/l inguinal hernia repair, presenting with diffuse abdominal pain x 2 days.      - Abdominal pain without elevated liver enzymes (Tbili 0.8, AST 28, ALT 21, Alk phos 119), CT with CBD dilation to 0.9cm and choledocholithiasis, also seen on a previous CT scan  - Cholelithiasis and porcelain gallbladder  - Hematochezia, currently HD stable and Hb 14  - Cirrhosis       RECS:  - will plan for ERCP tomorrow for stone removal  - NPO after midnight  - trend CBC, CMP, INR  -physical therapy per primary team  - surgery consult for eventual cholecystectomy  - supportive care as per primary team

## 2019-11-04 NOTE — PROGRESS NOTE ADULT - PROBLEM SELECTOR PLAN 4
- GI will defer ERCP until GI bleeding has been addressed  - NPO after midnight today pending decision by GI on inpt ERCP tomorrow - chronic, present on previous scan  - liver and pancreatic enzymes low at this time  - ERCP tomorrow  - NPO after midnight

## 2019-11-04 NOTE — PROGRESS NOTE ADULT - PROBLEM SELECTOR PLAN 7
Cr baseline ~ 1.2-1.5  - stable, within baseline, Cr rising likely from dehydration and melena, IVF were given on 10/30. Will continue to trend C/w propranolol

## 2019-11-04 NOTE — PROGRESS NOTE ADULT - PROBLEM SELECTOR PLAN 3
patient had sepsis on admission with Leukocytosis and tachycardia to 130 on admission likely from UTI and possible GI translocation in setting of new GIB.  Lactate 4.4 now downtrending s/p IVF.  +UA. Lungs clear on CXR, blood and urine cultures pending.  Pt remains afebrile.   - UCx grew E. coli resistant to Levoquin, but sensitive to other tested abx  - c/w zosyn (started 10/29)  - will d/c zosyn; E. coli from UCx sensitive to beta-lactam, covered by amoxicillin from triple therapy for H. pylori  - encourage PO water intake  - lactate downtrending patient had sepsis on admission with Leukocytosis and tachycardia to 130 on admission likely from UTI and possible GI translocation in setting of new GIB.  Lactate 4.4 now downtrending s/p IVF.  +UA. Lungs clear on CXR, blood and urine cultures pending.  Pt remains afebrile.   - UCx grew E. coli resistant to Levoquin, but sensitive to other tested abx  - c/w zosyn (started 10/29)  - will d/c zosyn; E. coli from UCx sensitive to beta-lactam, covered by amoxicillin from triple therapy for H. pylori  - encourage PO water intake patient had sepsis on admission with Leukocytosis and tachycardia to 130 on admission likely from UTI and possible GI translocation in setting of new GIB.  Lactate 4.4 now downtrending s/p IVF.  +UA. Lungs clear on CXR, BCx x2 negative, Ucx growing E.Coli (sensitive to Amoxicillin)

## 2019-11-04 NOTE — PROGRESS NOTE ADULT - ASSESSMENT
Impression:  # Hematochezia - Resolved   s/p colonoscopy revealing 10/31 revealing multiple nonbleeding colonic angioectasia s/p APC, 5mm sigmoid polyp which was removed, also large hemorrhoids.   Hemorrhoids likely source of bleeding    # Choledocholithiasis without elevated liver enzymes ,  CT with CBD dilation to 0.9cm and choledocholithiasis, also seen on a previous CT scan  Awaiting for ERCP for stone removal   Advanced GI following     # Porcelain gallbladder with gallstones    Given liver cirrhosis, 30d mortality was estimated to be >35% at 90 days recently, and it was discussed with the patient not to undergo cholecystectomy    # Decompensated alcohol Liver Cirrhosis , MELD Na - 16  Last drink of alcohol - 18 months ago     EV bleed in 2018, banded, small varices on EGD 9/2019, currently on Propanolol 10 BID, Current HR -62     No ascites , No splenomegaly, No Thrombocytopenia     remote h/o HE, currently AAO x3, no asterixis     coagulopathy , INR - 1.58    Recent CT abd with IV con - no liver mass     Recommendations:  -C/w Propanolol 10 BID   -Lactulose 15mL PO BID, titrate to 3-4 BMs  -Encourage alcohol abstinence,   -Monitor CBC and BMs Impression:  # Hematochezia - Resolved   s/p colonoscopy revealing 10/31 revealing multiple nonbleeding colonic angioectasia s/p APC, 5mm sigmoid polyp which was removed, also large hemorrhoids.   Hemorrhoids likely source of bleeding    #) Abdominal Pain - resolved   On CT abd - mild estrellita pancreatic inflammation, Lipase - WNL , LFT - WNL,   Elevated LA of 4 improved to 2.6, Elevated WBC of 15 improved to 9   possible sec to duodenitis/ mild pancreatitis /  h/o H.pylori recently - s/p treatment   Rec:  Protonix 40 mg daily     # Choledocholithiasis without elevated liver enzymes ,  CT with CBD dilation to 0.9cm and choledocholithiasis, also seen on a previous CT scan  Awaiting for ERCP for stone removal   Advanced GI following     # Porcelain gallbladder with gallstones    Given liver cirrhosis, 30d mortality was estimated to be >35% at 90 days recently, and it was discussed with the patient not to undergo cholecystectomy    # Decompensated alcohol Liver Cirrhosis , MELD Na - 16  Last drink of alcohol - 18 months ago     EV bleed in 2018, banded, small varices on EGD 9/2019, currently on Propanolol 10 BID, Current HR -62     No ascites , No splenomegaly, No Thrombocytopenia     remote h/o HE, currently AAO x3, no asterixis     coagulopathy , INR - 1.58    Recent CT abd with IV con - no liver mass     Recommendations:  -C/w Propanolol 10 BID   -Lactulose 15mL PO BID, titrate to 3-4 BMs  -Encourage alcohol abstinence,   -Monitor CBC and BMs

## 2019-11-04 NOTE — PROGRESS NOTE ADULT - PROBLEM SELECTOR PLAN 10
DVT: pharm ppx on hold in setting of melena  Diet: clears at present; regular diet  Dispo: pending inpt rehab DVT: pharm ppx on hold in setting of melena  Diet: clears at present; regular diet  Dispo: pending subacute rehab

## 2019-11-05 LAB
ALBUMIN SERPL ELPH-MCNC: 3.1 G/DL — LOW (ref 3.3–5)
ALP SERPL-CCNC: 83 U/L — SIGNIFICANT CHANGE UP (ref 40–120)
ALT FLD-CCNC: 16 U/L — SIGNIFICANT CHANGE UP (ref 4–41)
ANION GAP SERPL CALC-SCNC: 11 MMO/L — SIGNIFICANT CHANGE UP (ref 7–14)
APTT BLD: 32.4 SEC — SIGNIFICANT CHANGE UP (ref 27.5–36.3)
AST SERPL-CCNC: 19 U/L — SIGNIFICANT CHANGE UP (ref 4–40)
BILIRUB SERPL-MCNC: 0.5 MG/DL — SIGNIFICANT CHANGE UP (ref 0.2–1.2)
BUN SERPL-MCNC: 24 MG/DL — HIGH (ref 7–23)
CALCIUM SERPL-MCNC: 9.2 MG/DL — SIGNIFICANT CHANGE UP (ref 8.4–10.5)
CHLORIDE SERPL-SCNC: 103 MMOL/L — SIGNIFICANT CHANGE UP (ref 98–107)
CO2 SERPL-SCNC: 23 MMOL/L — SIGNIFICANT CHANGE UP (ref 22–31)
CREAT SERPL-MCNC: 1.64 MG/DL — HIGH (ref 0.5–1.3)
GLUCOSE SERPL-MCNC: 113 MG/DL — HIGH (ref 70–99)
HCT VFR BLD CALC: 35.7 % — LOW (ref 39–50)
HGB BLD-MCNC: 11.3 G/DL — LOW (ref 13–17)
INR BLD: 1.57 — HIGH (ref 0.88–1.17)
MAGNESIUM SERPL-MCNC: 1.7 MG/DL — SIGNIFICANT CHANGE UP (ref 1.6–2.6)
MCHC RBC-ENTMCNC: 25.5 PG — LOW (ref 27–34)
MCHC RBC-ENTMCNC: 31.7 % — LOW (ref 32–36)
MCV RBC AUTO: 80.4 FL — SIGNIFICANT CHANGE UP (ref 80–100)
NRBC # FLD: 0 K/UL — SIGNIFICANT CHANGE UP (ref 0–0)
PHOSPHATE SERPL-MCNC: 2.5 MG/DL — SIGNIFICANT CHANGE UP (ref 2.5–4.5)
PLATELET # BLD AUTO: 195 K/UL — SIGNIFICANT CHANGE UP (ref 150–400)
PMV BLD: 10 FL — SIGNIFICANT CHANGE UP (ref 7–13)
POTASSIUM SERPL-MCNC: 4.3 MMOL/L — SIGNIFICANT CHANGE UP (ref 3.5–5.3)
POTASSIUM SERPL-SCNC: 4.3 MMOL/L — SIGNIFICANT CHANGE UP (ref 3.5–5.3)
PROT SERPL-MCNC: 6.5 G/DL — SIGNIFICANT CHANGE UP (ref 6–8.3)
PROTHROM AB SERPL-ACNC: 17.7 SEC — HIGH (ref 9.8–13.1)
RBC # BLD: 4.44 M/UL — SIGNIFICANT CHANGE UP (ref 4.2–5.8)
RBC # FLD: 16.9 % — HIGH (ref 10.3–14.5)
SODIUM SERPL-SCNC: 137 MMOL/L — SIGNIFICANT CHANGE UP (ref 135–145)
WBC # BLD: 6.2 K/UL — SIGNIFICANT CHANGE UP (ref 3.8–10.5)
WBC # FLD AUTO: 6.2 K/UL — SIGNIFICANT CHANGE UP (ref 3.8–10.5)

## 2019-11-05 PROCEDURE — 43274 ERCP DUCT STENT PLACEMENT: CPT | Mod: GC

## 2019-11-05 PROCEDURE — 74330 X-RAY BILE/PANC ENDOSCOPY: CPT | Mod: 26,GC

## 2019-11-05 PROCEDURE — 93010 ELECTROCARDIOGRAM REPORT: CPT | Mod: 77

## 2019-11-05 PROCEDURE — 99232 SBSQ HOSP IP/OBS MODERATE 35: CPT | Mod: GC

## 2019-11-05 PROCEDURE — 93010 ELECTROCARDIOGRAM REPORT: CPT

## 2019-11-05 PROCEDURE — 43264 ERCP REMOVE DUCT CALCULI: CPT | Mod: GC

## 2019-11-05 RX ORDER — INDOMETHACIN 50 MG
100 CAPSULE ORAL ONCE
Refills: 0 | Status: COMPLETED | OUTPATIENT
Start: 2019-11-05 | End: 2019-11-05

## 2019-11-05 RX ORDER — SODIUM CHLORIDE 9 MG/ML
1000 INJECTION INTRAMUSCULAR; INTRAVENOUS; SUBCUTANEOUS
Refills: 0 | Status: DISCONTINUED | OUTPATIENT
Start: 2019-11-05 | End: 2019-11-05

## 2019-11-05 RX ORDER — SODIUM CHLORIDE 9 MG/ML
1000 INJECTION INTRAMUSCULAR; INTRAVENOUS; SUBCUTANEOUS ONCE
Refills: 0 | Status: COMPLETED | OUTPATIENT
Start: 2019-11-05 | End: 2019-11-05

## 2019-11-05 RX ORDER — CIPROFLOXACIN LACTATE 400MG/40ML
400 VIAL (ML) INTRAVENOUS ONCE
Refills: 0 | Status: COMPLETED | OUTPATIENT
Start: 2019-11-05 | End: 2019-11-05

## 2019-11-05 RX ADMIN — Medication 100 MILLIGRAM(S): at 14:19

## 2019-11-05 RX ADMIN — Medication 1000 MILLIGRAM(S): at 05:37

## 2019-11-05 RX ADMIN — Medication 500 MILLIGRAM(S): at 06:50

## 2019-11-05 RX ADMIN — LACTULOSE 15 GRAM(S): 10 SOLUTION ORAL at 18:59

## 2019-11-05 RX ADMIN — SODIUM CHLORIDE 1000 MILLILITER(S): 9 INJECTION INTRAMUSCULAR; INTRAVENOUS; SUBCUTANEOUS at 06:20

## 2019-11-05 RX ADMIN — PANTOPRAZOLE SODIUM 40 MILLIGRAM(S): 20 TABLET, DELAYED RELEASE ORAL at 18:59

## 2019-11-05 RX ADMIN — Medication 1000 MILLIGRAM(S): at 18:59

## 2019-11-05 RX ADMIN — PANTOPRAZOLE SODIUM 40 MILLIGRAM(S): 20 TABLET, DELAYED RELEASE ORAL at 05:37

## 2019-11-05 RX ADMIN — Medication 500 MILLIGRAM(S): at 18:59

## 2019-11-05 RX ADMIN — LACTULOSE 15 GRAM(S): 10 SOLUTION ORAL at 05:36

## 2019-11-05 RX ADMIN — Medication 200 MILLIGRAM(S): at 14:20

## 2019-11-05 NOTE — PROGRESS NOTE ADULT - PROBLEM SELECTOR PLAN 8
Decompensated ETOH Cirrhosis, previously on transplant list but no longer  MELD-Na 20. 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 No ascities on CT. 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  Liver lesion on MR concerning for HCC, repeat MR inconclusive, being actively followed as outpatient.   - Mental status at baseline per wife, currently AOx3 (but states he has been getting forgetful with age)   - Trend MELD labs  - Propranolol 20 mg bid  - no BM yesterday, will increase lactulose to 15 g BID  - ammonia wnl

## 2019-11-05 NOTE — PROVIDER CONTACT NOTE (OTHER) - ASSESSMENT
Patient states " I am just very thirsty, I had to drink water." Patient educated on why he cannot eat or drink anything.

## 2019-11-05 NOTE — PROVIDER CONTACT NOTE (OTHER) - ACTION/TREATMENT ORDERED:
Dr. Stanley made aware. No further interventions needed; holding propanolol as ordered. Will continue to monitor patient.

## 2019-11-05 NOTE — MEDICAL STUDENT PROGRESS NOTE(EDUCATION) - NS MD HP STUD ASPLAN PLAN FT
#Abdominal pain: Etiology of abdominal pain is unclear. Pain is resolving since hospital admission. Dilitation of CBD seen on CTAP on admission c/w previous imaging. Patient is not a surgical candidate due to comorbidities. Possible etiology chronic choledocolithiasa vs duodenitis 2/2 untreated H. pylori infection found on endoscopy sept 2019 vs mild pancreatitis, less likely due to negative liver enzymes and absent of current risk factors.  -C/w triple therapy (pantoprazole 40 mg BID-amoxicillin 1g BID-clarithromycin 500 mg BID) on 11/1. Day 5 of 14 days for H. pylori infection.  -Continue Tylenol max 2g daily for pain control  -Per GI, ERCP to address choledocholithiasis scheduled for today. Pt ordered to be NPO after midnight, consumed approx 2 oz water.     #GI Bleed: Colonoscopy revealed multiple non-bleeding angioectasis which were cauterized, 5mm sigmoid polyp removed for bx, rectal varices, and internal hemorrhoids likely source of GI bleed. Pt denies blood in stool overnight. Hgb/Hct stable on morning labs.  -Continue to monitor for signs of bleeding.  -Bx returned at [facility]    #UTI: Leukocytosis,  on admission with downtrending lactate 4.4>3.5>2.6. Etiology of infection likely urinary tract infection. Leukocytosis has resolved. VS stable and WNL. Urine culture positive for E. coli.  -C/w triple therapy which include abx E. coli coverage per Pharmacy.  -Blood cultures obtained on 10/29. Final result negative.     #CKD: Stage 3 CKD per outpatient chart. Basline Cr 1.2-1.5. Current SCr 1.47.  -Stable. Monitor BMP qd  -Avoid possible nephrotoxic medications.    #Liver cirrhosis: H/o decompensated alcoholic cirrhosis with esophageal varices (May 2018) and encephalopathy on lactulose.  -C/w Lactulose 15 g BID.  -Monitor mental status  -Trend MELD-Na labs- MELD-Na  today  -Continue with propranolol 10 mg BID    #Prophylactic measures  DVT: IMPROVE score of 1 with bleeding risk. Pharm prophylaxis held.   Diet: Diet, low fat. Currently NPO pending ERCP. F/u GI on timing.  Disposition: Pending inpatient rehab placement. #Abdominal pain: Etiology of abdominal pain is unclear. Pain is resolving since hospital admission. Dilitation of CBD seen on CTAP on admission c/w previous imaging. Patient is not a surgical candidate due to comorbidities. Possible etiology chronic choledocolithiasa vs duodenitis 2/2 untreated H. pylori infection found on endoscopy sept 2019 vs mild pancreatitis, less likely due to negative liver enzymes and absent of current risk factors.  -C/w triple therapy (pantoprazole 40 mg BID-amoxicillin 1g BID-clarithromycin 500 mg BID) on 11/1. Day 5 of 14 days for H. pylori infection.  -Continue Tylenol max 2g daily for pain control  -Per GI, ERCP to address choledocholithiasis scheduled for today. Pt ordered to be NPO after midnight, consumed approx 2 oz water.    #Hypotension: BP 85/55 overnight in the absence of other abnormal VS. Received 1L bolus normal saline, BP improved to 138/58. Hypotension likely due to inaccurate reading.   -Stable. Continue to monitor VS    #GI Bleed: Colonoscopy revealed multiple non-bleeding angioectasis which were cauterized, 5mm sigmoid polyp removed for bx, rectal varices, and internal hemorrhoids likely source of GI bleed. Pt denies blood in stool overnight. Hgb/Hct stable on morning labs.  -Continue to monitor for signs of bleeding.  -Bx returned at [facility] aden[facility]    #UTI: Leukocytosis,  on admission with downtrending lactate 4.4>3.5>2.6. Etiology of infection likely urinary tract infection. Leukocytosis has resolved. VS stable and WNL. Urine culture positive for E. coli.  -C/w triple therapy which include abx E. coli coverage per Pharmacy.  -Blood cultures obtained on 10/29. Final result negative.     #CKD: Stage 3 CKD per outpatient chart. Basline Cr 1.2-1.5. Current SCr- No SCr reported today.  -Stable. Monitor BMP qd  -Avoid possible nephrotoxic medications.    #Liver cirrhosis: H/o decompensated alcoholic cirrhosis with esophageal varices (May 2018) and encephalopathy on lactulose.  -C/w Lactulose 15 g BID.  -Monitor mental status  -Trend MELD-Na labs- MELD-Na 17 today  -Continue with propranolol 10 mg BID    #Prophylactic measures  DVT: IMPROVE score of 1 with bleeding risk. Pharm prophylaxis held.   Diet: Diet, low fat. Currently NPO pending ERCP. F/u GI on timing.  Disposition: Pending inpatient rehab placement.

## 2019-11-05 NOTE — CHART NOTE - NSCHARTNOTEFT_GEN_A_CORE
Notified by RN for hypotension: SBP 85/55; other VSS. Pt seen at bedside - comfortable appearing. Pt denies dizziness/ lightheadedness, SOB, CP. No signs of recent bleeding. Exam unremarkable - pt is A&Ox3; lungs are clear; normal s1 s2 - no m/r/g. EKG unchanged from prior. Given history of GI bleed, will get CBC to evaluate H&H. This may be hypovolemic shock. Low concern for cardiogenic/ septic/ neurogenic shock. Notified by RN for hypotension: SBP 85/55; other VSS. Pt seen at bedside - comfortable appearing. Pt denies dizziness/ lightheadedness, SOB, CP. No signs of recent bleeding. Exam unremarkable - pt is A&Ox3; lungs are clear; normal s1 s2 - no m/r/g. EKG unchanged from prior. Given history of GI bleed, will get CBC to evaluate H&H and give 1L NS. This may be hypovolemic shock. Low concern for cardiogenic/ septic/ neurogenic shock.

## 2019-11-05 NOTE — MEDICAL STUDENT PROGRESS NOTE(EDUCATION) - NS MD HP STUD ASPLAN ASSES FT
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 Abdominal pain likely chronic choledocolithiasis vs duodenitis vs mild pancreatitis.

## 2019-11-05 NOTE — PROGRESS NOTE ADULT - PROBLEM SELECTOR PLAN 4
- chronic, present on previous scan  - liver and pancreatic enzymes low at this time  - ERCP tomorrow  - NPO after midnight - chronic, present on previous scan  - liver and pancreatic enzymes low at this time  - ERCP today

## 2019-11-05 NOTE — PROGRESS NOTE ADULT - PROBLEM SELECTOR PLAN 2
- After discussion with GI, source likely hemorrhoidal bleed. Less likely sources are variceal vs PUD vs duodenal ulcer, known duodenitis and H. pylori from Sept 2019 surgical pathology that hasn't been treated.    - H/H stable; CBC qD now   - keep T/S active, transfuse for Hgb <8 or with symptoms or continued bleeding  - Hepatology consulted, recs appreciated

## 2019-11-05 NOTE — PROGRESS NOTE ADULT - PROBLEM SELECTOR PLAN 9
DVT: pharm ppx on hold in setting of melena  Diet: clears at present; regular diet  Dispo: pending subacute rehab

## 2019-11-05 NOTE — PROVIDER CONTACT NOTE (OTHER) - BACKGROUND
Patient admitted for bile duct calculus with pmh of hypertension and Guillain [place] syndrome.
Patient admitted for bile duct calculus, with pmh of hypertension and liver cirrhosis.
Patient is admitted with calculus
Pt admitted with calculus of bile duct without obstruction, cholangitis or cholecystitis. PMH of HTN and guillain-barre syndrome.
Pt admitted with calculus of bile duct without obstruction, cholangitis, or cholecystitis.
admitted with calculuks of bile duct without obstruction
admitted with calculuks of bile duct without obstruction
pt admitted for GI bleed. pmhHTN, GB

## 2019-11-05 NOTE — PROVIDER CONTACT NOTE (OTHER) - RECOMMENDATIONS
Assess if fluids need to be administered to patient and if propanolol should be held.
Assess if patient needs continuous fluids.
Continue to educate.
Continue to monitor the pt.
Fluid bolus, continue to monitor. Come and assess patient.
Fluid bolus, continue to monitor. Come and assess patient.
Hold the standing dose of propranolol.
administer fluids.

## 2019-11-05 NOTE — PROGRESS NOTE ADULT - ASSESSMENT
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  Admitted with SIRS possibly 2/2 PUD (H. pylori+ on previous EGD never started on triple therapy), vs choledocholithiasis vs. pancreatitis and acute blood loss anemia likely from PUD vs varices.  Course c/b asymptomatic E. coli bacteruria.  Currently on triple therapy for H. pylori found on previous EGD bx, day 3 of 14. 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  Admitted with SIRS possibly 2/2 PUD (H. pylori+ on previous EGD never started on triple therapy), vs choledocholithiasis vs. pancreatitis and acute blood loss anemia likely from PUD vs varices.  Course c/b asymptomatic E. coli bacteruria.  Currently on triple therapy for H. pylori found on previous EGD bx, day 3 of 14.  Awaiting ERCP for choledocholithasis.

## 2019-11-05 NOTE — PROVIDER CONTACT NOTE (OTHER) - ASSESSMENT
/54, HR 58  due for 1800 propanolol. Patient denies dizziness and headache at this time. /54, HR 58  due for 1800 propanolol. Patient denies dizziness and headache at this time. First BP 94/55.

## 2019-11-05 NOTE — PROGRESS NOTE ADULT - ASSESSMENT
Impression:  # Hematochezia - Resolved , Hb - stable   s/p colonoscopy, AVM - APC , large hemorrhoids likely source of bleeding   Rec - Avoid constipation , Anusol HC PRN for rectal bleeding     #) Abdominal Pain - resolved   On CT abd - mild estrellita pancreatic inflammation, Lipase - WNL , LFT - WNL,   possible sec to duodenitis/ mild pancreatitis /  h/o H.pylori recently - s/p treatment   Rec:  Protonix 40 mg daily     # Choledocholithiasis without elevated liver enzymes ,  CT with CBD dilation to 0.9cm and choledocholithiasis, also seen on a previous CT scan  Awaiting for ERCP for stone removal , scheduled for today   Advanced GI following     # Porcelain gallbladder with gallstones    Given liver cirrhosis, 30d mortality was estimated to be >35% at 90 days recently, and it was discussed with the patient not to undergo cholecystectomy    # Decompensated alcohol Liver Cirrhosis , MELD Na - 18   Last drink of alcohol - 18 months ago     EV bleed in 2018, banded, small varices on EGD 9/2019, currently on Propanolol 10 BID, Current HR -64 , BP - 114/79 , + hypotension in am      No ascites , No splenomegaly, No Thrombocytopenia     remote h/o HE, currently AAO x3, no asterixis     coagulopathy , INR - 1.57    Recent CT abd with IV con - no liver mass     CKD - Cr- 1.6 @ baseline , Na - 137, K- 4.3     Recommendations:  -C/w Propanolol 10 BID , monitor BP, HR   -Lactulose 15mL PO BID, titrate to 2-3  BMs per day   -Encourage alcohol abstinence,   -Monitor CBC and BMs    #) One Diminutive sigmoid polyp(  path - TA )  Needs surveillance colonoscopy at 5 years Impression:  # Hematochezia - Resolved , Hb - stable   s/p colonoscopy, AVM - APC , large hemorrhoids likely source of bleeding   Rec - Avoid constipation , Anusol HC PRN for rectal bleeding     #) Abdominal Pain - resolved   On CT abd - mild estrellita pancreatic inflammation, Lipase - WNL , LFT - WNL,   possible sec to duodenitis/ mild pancreatitis /  h/o H.pylori recently - s/p treatment   Rec:  Protonix 40 mg daily     # Choledocholithiasis without elevated liver enzymes ,  CT with CBD dilation to 0.9cm and choledocholithiasis, also seen on a previous CT scan  Awaiting for ERCP for stone removal , scheduled for today   Advanced GI following     # Porcelain gallbladder with gallstones    Given liver cirrhosis, 30d mortality was estimated to be >35% at 90 days recently, and it was discussed with the patient not to undergo cholecystectomy    # Decompensated alcohol Liver Cirrhosis , MELD Na - 18   Last drink of alcohol - 18 months ago     EV bleed in 2018, banded, small varices on EGD 9/2019, currently on Propanolol 10 BID, Current HR -64 , BP - 114/79 , + hypotension in am      No ascites , No splenomegaly, No Thrombocytopenia     remote h/o HE, currently AAO x3, no asterixis     coagulopathy , INR - 1.57    Recent CT abd with IV con - no liver mass     CKD - Cr- 1.6 @ baseline , Na - 137, K- 4.3     Recommendations:  -C/w Propanolol 10 BID , monitor BP, HR   -Lactulose 15mL PO BID, titrate to 2-3  BMs per day   -Encourage alcohol abstinence,   -Monitor CBC and BMs  - Outpatient follow up with      #) One Diminutive sigmoid polyp(  path - TA )  Needs surveillance colonoscopy at 5 years     Recall hepatology as needed

## 2019-11-05 NOTE — PROGRESS NOTE ADULT - PROBLEM SELECTOR PLAN 1
- Colicky upper abd pain +/- associated w/ food  - ddx includes PUD vs choledocholithiasis, pancreatitis, duodenitis, mesenteric ischemia  - CT A/P shows cholelithiasis, choledocholithiasis w/ CBD dilitation; neg pancreatitis    - given hematochezia this admission, c/f bleeding PUD  - s/p colonoscopy (10/31)  - low likelihood of ischemic abdominal pathology at this time  - Plan for ERCP tomorrow for stone extraction   - c/w triple therapy for H. pylori, day 3 of 14 - Colicky upper abd pain +/- associated w/ food  - ddx includes PUD vs choledocholithiasis, pancreatitis, duodenitis, mesenteric ischemia  - CT A/P shows cholelithiasis, choledocholithiasis w/ CBD dilitation; neg pancreatitis    - given hematochezia this admission, c/f bleeding PUD  - s/p colonoscopy (10/31)  - low likelihood of ischemic abdominal pathology at this time  - Plan for ERCP today for stone extraction   - c/w triple therapy for H. pylori, day 5 of 14

## 2019-11-05 NOTE — PROVIDER CONTACT NOTE (OTHER) - DATE AND TIME:
05-Nov-2019 05:49
05-Nov-2019 05:49
05-Nov-2019 05:55
05-Nov-2019 07:55
29-Oct-2019 05:30
30-Oct-2019 15:58
31-Oct-2019 05:10
05-Nov-2019 18:43

## 2019-11-05 NOTE — PROVIDER CONTACT NOTE (OTHER) - NAME OF MD/NP/PA/DO NOTIFIED:
Dr. Matt Rivas
MD Bustillo
MD Hellerman
MD key
Dr. Davon Stanley

## 2019-11-05 NOTE — PROGRESS NOTE ADULT - PROBLEM SELECTOR PLAN 3
patient had sepsis on admission with Leukocytosis and tachycardia to 130 on admission likely from UTI and possible GI translocation in setting of new GIB.  Lactate 4.4 now downtrending s/p IVF.  +UA. Lungs clear on CXR, BCx x2 negative, Ucx growing E.Coli (sensitive to Amoxicillin)

## 2019-11-05 NOTE — PROGRESS NOTE ADULT - SUBJECTIVE AND OBJECTIVE BOX
NOTE INCOMPLETE    Matt Rivas MD, PhD  PGY1  [phone] / [phone]    Interval Hx / Subjective:    ROS:  CON: denied fever, chills, lightheaded, dizziness  HEENT: denied changes to smell, vision, hearing, taste; denied throat pain  CV: denied chest pain, racing heart, skipped beats  RESP: denied SOB, cough, wheezing  GI: denied nausea, vomiting, heartburn; diarrhea, constipation, dark or bloody stool  : denied flank pain; pain or burning with urination; bloody urine  MSK: denied joint pain or swelling; denied muscle ache  ENDO: denied heat or cold intolerance  NEURO: denied headache, slurred speech; tingling, numbness  SKIN: denied itching, rash, other skin changes    Medications Standing  MEDICATIONS  (STANDING):  amoxicillin 1000 milliGRAM(s) Oral two times a day  clarithromycin 500 milliGRAM(s) Oral two times a day  influenza   Vaccine 0.5 milliLiter(s) IntraMuscular once  lactulose Syrup 15 Gram(s) Oral two times a day  pantoprazole    Tablet 40 milliGRAM(s) Oral two times a day  propranolol 10 milliGRAM(s) Oral two times a day    Medications PRN  MEDICATIONS  (PRN):  acetaminophen   Tablet .. 650 milliGRAM(s) Oral every 6 hours PRN Mild Pain (1 - 3), Moderate Pain (4 - 6)      Objective:  T(C): 36.9 (11-05-19 @ 05:36), Max: 36.9 (11-05-19 @ 05:36)  T(F): 98.5 (11-05-19 @ 05:36), Max: 98.5 (11-05-19 @ 05:36)  HR: 64 (11-05-19 @ 05:36) (53 - 70)  BP: 85/55 (11-05-19 @ 05:36) (85/55 - 136/81)  RR: 16 (11-05-19 @ 05:36) (16 - 18)  SpO2: 99% (11-05-19 @ 05:36) (98% - 100%)    Physical Exam:  GEN:   HEENT: PERRL, EOMI; mucous moist; neck supple without LAD  CV: RRR, S1, S2; no M/R/G; good capillary refill  PULM: LCTAB; not using accessory muscles  ABD: normal bowel sounds; non-distended, soft; non-tender; no rebound, no guarding  BACK: no CVA tenderness  Extremities: no clubbing, cyanosis; no edema; DP and radial pulses palpable  NEURO: AAOx3; no FND                Microbiology: NOTE INCOMPLETE    Matt Rivas MD, PhD  PGY1  [phone] / [phone]    Interval Hx / Subjective: Had 1 episode of hypotension this morning, however pt denied any symptoms including dizziness, HA, SOB, CP.  Feels well this morning, awaiting ERCP.     ROS:  CON: denied fever, chills, lightheaded, dizziness  HEENT: denied changes to smell, vision, hearing, taste; denied throat pain  CV: denied chest pain, racing heart, skipped beats  RESP: denied SOB, cough, wheezing  GI: denied nausea, vomiting, heartburn; diarrhea, constipation, dark or bloody stool  : denied flank pain; pain or burning with urination; bloody urine  MSK: denied joint pain or swelling; denied muscle ache  ENDO: denied heat or cold intolerance  NEURO: denied headache, slurred speech; tingling, numbness  SKIN: denied itching, rash, other skin changes    Medications Standing  MEDICATIONS  (STANDING):  amoxicillin 1000 milliGRAM(s) Oral two times a day  clarithromycin 500 milliGRAM(s) Oral two times a day  influenza   Vaccine 0.5 milliLiter(s) IntraMuscular once  lactulose Syrup 15 Gram(s) Oral two times a day  pantoprazole    Tablet 40 milliGRAM(s) Oral two times a day  propranolol 10 milliGRAM(s) Oral two times a day    Medications PRN  MEDICATIONS  (PRN):  acetaminophen   Tablet .. 650 milliGRAM(s) Oral every 6 hours PRN Mild Pain (1 - 3), Moderate Pain (4 - 6)      Objective:  T(C): 36.9 (11-05-19 @ 05:36), Max: 36.9 (11-05-19 @ 05:36)  T(F): 98.5 (11-05-19 @ 05:36), Max: 98.5 (11-05-19 @ 05:36)  HR: 64 (11-05-19 @ 05:36) (53 - 70)  BP: 85/55 (11-05-19 @ 05:36) (85/55 - 136/81)  RR: 16 (11-05-19 @ 05:36) (16 - 18)  SpO2: 99% (11-05-19 @ 05:36) (98% - 100%)    Physical Exam:  GEN:   HEENT: PERRL, EOMI; mucous moist; neck supple without LAD  CV: RRR, S1, S2; no M/R/G; good capillary refill  PULM: LCTAB; not using accessory muscles  ABD: normal bowel sounds; non-distended, soft; non-tender; no rebound, no guarding  BACK: no CVA tenderness  Extremities: no clubbing, cyanosis; no edema; DP and radial pulses palpable  NEURO: AAOx3; no FND

## 2019-11-05 NOTE — MEDICAL STUDENT PROGRESS NOTE(EDUCATION) - SUBJECTIVE AND OBJECTIVE BOX
SAEED KETAN  79y  Male      Patient is a 79y old  Male who presents with a chief complaint of Abdominal Pain (05 Nov 2019 06:51)    Subjective:  INTERVAL HPI/OVERNIGHT EVENTS: BP was measured at 85/55 overnight. Pt in not acute distress and no signs of bleeding. 1L bolus normal saline given by night team. Patient ordered NPO after midnight for ERCP drank approx 2 oz of water (per pt) at 0500 because "he was thirsty." Education provided by night team.     Patient interviewed and examined at bedside Abdominal pain improved since admission. States has not had a bowel movement since yesterday. Denies fever/chills/nausea/vomiting/diarrhea/CP/SOB/lightheadedness/dizziness.    HOSPITAL MEDICATIONS  acetaminophen   Tablet .. 650 milliGRAM(s) Oral every 6 hours PRN  amoxicillin 1000 milliGRAM(s) Oral two times a day  clarithromycin 500 milliGRAM(s) Oral two times a day  influenza   Vaccine 0.5 milliLiter(s) IntraMuscular once  lactulose Syrup 15 Gram(s) Oral two times a day  pantoprazole    Tablet 40 milliGRAM(s) Oral two times a day  propranolol 10 milliGRAM(s) Oral two times a day    Vital Signs Last 24 Hrs  T(C): 36.9 (05 Nov 2019 05:36), Max: 36.9 (05 Nov 2019 05:36)  T(F): 98.5 (05 Nov 2019 05:36), Max: 98.5 (05 Nov 2019 05:36)  HR: 64 (05 Nov 2019 05:36) (60 - 70)  BP: 85/55 (05 Nov 2019 05:36) (85/55 - 125/68)  BP(mean): --  RR: 16 (05 Nov 2019 05:36) (16 - 18)  SpO2: 99% (05 Nov 2019 05:36) (98% - 99%)    PHYSICAL EXAM:  GENERAL: Pleasant older gentleman lying comfortable in bed in NAD  HEAD:  Atraumatic, Normocephalic  EYES: Conjunctiva clear  ENMT: No tonsillar erythema, exudates, or enlargement; Mildly dry mucous membranes.  NECK: Supple, No JVD, No thyromegaly.  NERVOUS SYSTEM:  Alert & Oriented X3.  CHEST/LUNG: Clear to auscultation bilaterally; No rales, rhonchi, wheezing.   HEART: Regular rate and rhythm; No murmurs, rubs, or gallops  ABDOMEN: Soft, Diffuse mild tenderness to palpation in UQs, Nondistended; Bowel sounds normoactive.  EXTREMITIES:  2+ Peripheral Pulses, No clubbing, cyanosis, or edema  LYMPH: No lymphadenopathy appreciated.  SKIN: Skin appears dry. No rashes or lesions      LABS:                        11.3   6.20  )-----------( 195      ( 05 Nov 2019 05:20 )             35.7     11-04    138  |  105  |  23  ----------------------------<  126<H>  4.1   |  20<L>  |  1.47<H>    Ca    8.9      04 Nov 2019 05:46  Phos  2.2     11-04  Mg     1.7     11-04    TPro  6.5  /  Alb  3.1<L>  /  TBili  0.4  /  DBili  x   /  AST  18  /  ALT  17  /  AlkPhos  81  11-04    PT/INR - ( 05 Nov 2019 05:20 )   PT: 17.7 SEC;   INR: 1.57          PTT - ( 05 Nov 2019 05:20 )  PTT:32.4 SEC

## 2019-11-05 NOTE — PROGRESS NOTE ADULT - SUBJECTIVE AND OBJECTIVE BOX
GI HPI Today:  Patient is a 79y old  Male who presents with a chief complaint of Abdominal Pain (05 Nov 2019 06:51)    Hepatology following the patient for rectal bleeding , currently resolved     Interval Events:  This am pt had an episode of hypotension, 88/50  resolved with IV fluids   no gi bleed , no fever , abd pain .   Pt s/p colonoscopy last week. Colonoscopy revealed three nonbleeding colonic angioectasias that were treated with APC, 5mm sigmoid polyp which was removed, also rectal varix and hemorrhoids.   Hgb now stable . Abd pain resolved . No blood transfusions overnight.     PAST MEDICAL & SURGICAL HISTORY  Liver cirrhosis  Guillain-[place] syndrome  HTN (hypertension)  H/O inguinal hernia repair      ALLERGIES:  No Known Allergies      MEDICATIONS:  MEDICATIONS  (STANDING):  amoxicillin 1000 milliGRAM(s) Oral two times a day  clarithromycin 500 milliGRAM(s) Oral two times a day  influenza   Vaccine 0.5 milliLiter(s) IntraMuscular once  lactulose Syrup 15 Gram(s) Oral two times a day  pantoprazole    Tablet 40 milliGRAM(s) Oral two times a day  propranolol 10 milliGRAM(s) Oral two times a day  sodium chloride 0.9%. 1000 milliLiter(s) (30 mL/Hr) IV Continuous <Continuous>    MEDICATIONS  (PRN):  acetaminophen   Tablet .. 650 milliGRAM(s) Oral every 6 hours PRN Mild Pain (1 - 3), Moderate Pain (4 - 6)      REVIEW OF SYSTEMS  General:  No fevers  Eyes:  No reported pain   ENT:  No sore throat   NECK: No stiffness   CV:  No chest pain   Resp:  No shortness of breath  GI:  See HPI  :  No dysuria  Muscle:  No weakness  Neuro:  No tingling  Endocrine:  No polyuria  Heme:  No ecchymosis        VITALS:   T(F): 97.6 (11-05 @ 10:55), Max: 98.5 (11-05 @ 05:36)  HR: 61 (11-05 @ 10:55) (53 - 76)  BP: 147/73 (11-05 @ 10:55) (85/55 - 160/90)  BP(mean): --  RR: 24 (11-05 @ 10:55) (16 - 24)  SpO2: 99% (11-05 @ 10:55) (95% - 100%)        PHYSICAL EXAM:    GENERAL:  Appears stated age, well-groomed, well-nourished, no distress  EYES: No scleral icterus   LUNG: Clear to auscultation bilaterally;   HEART: s1 and s2 heard   ABDOMEN: Soft, +BS, no abd distension, no Abdominal Tenderness, No guarding, No Garduno Sign   Neuro: AAO x3 , no asterix        Blood Work :                        11.3   6.20  )-----------( 195      ( 05 Nov 2019 05:20 )             35.7     PT/INR - ( 05 Nov 2019 05:20 )  INR: 1.57          PTT - ( 05 Nov 2019 05:20 )  PTT:32.4   11-05    137  |  103  |  24<H>  ----------------------------<  113<H>  4.3   |  23  |  1.64<H>    Ca    9.2      05 Nov 2019 05:20  Phos  2.5     11-05  Mg     1.7     11-05      CBC -  ( 05 Nov 2019 05:20 )  Hemoglobin : 11.3    CBC -  ( 04 Nov 2019 05:46 )  Hemoglobin : 11.5    CBC -  ( 03 Nov 2019 06:20 )  Hemoglobin : 12.1    CBC -  ( 02 Nov 2019 06:45 )  Hemoglobin : 12.9      LIVER FUNCTIONS - ( 05 Nov 2019 05:20 )  Alb: 3.1 [3.3 - 5.0] / Pro: 6.5 [6.0 - 8.3] / ALK PHOS: 83 [40 - 120] / ALT: 16 [4 - 41] / AST: 19 [4 - 40] / GGT: x     LIVER FUNCTIONS - ( 04 Nov 2019 05:46 )  Alb: 3.1 [3.3 - 5.0] / Pro: 6.5 [6.0 - 8.3] / ALK PHOS: 81 [40 - 120] / ALT: 17 [4 - 41] / AST: 18 [4 - 40] / GGT: x     LIVER FUNCTIONS - ( 02 Nov 2019 06:45 )  Alb: 3.3 [3.3 - 5.0] / Pro: 6.7 [6.0 - 8.3] / ALK PHOS: 96 [40 - 120] / ALT: 24 [4 - 41] / AST: 22 [4 - 40] / GGT: x     LIVER FUNCTIONS - ( 01 Nov 2019 05:50 )  Alb: 3.1 [3.3 - 5.0] / Pro: 6.5 [6.0 - 8.3] / ALK PHOS: 92 [40 - 120] / ALT: 26 [4 - 41] / AST: 32 [4 - 40] / GGT: x     LIVER FUNCTIONS - ( 31 Oct 2019 05:15 )  Alb: 3.2 [3.3 - 5.0] / Pro: 6.9 [6.0 - 8.3] / ALK PHOS: 104 [40 - 120] / ALT: 31 [4 - 41] / AST: 47 [4 - 40] / GGT: x     LIVER FUNCTIONS - ( 30 Oct 2019 06:30 )  Alb: 3.3 [3.3 - 5.0] / Pro: 7.2 [6.0 - 8.3] / ALK PHOS: 105 [40 - 120] / ALT: 23 [4 - 41] / AST: 32 [4 - 40] / GGT: x         RADIOLOGY:      < from: US Abdomen Limited (10.28.19 @ 18:05) >  FINDINGS:    Limited examination secondary to bowel gas    Liver: Redemonstration of cirrhotic morphology.    Bile ducts: Normal caliber. Common bile duct measures 6 mm.     Gallbladder: Poorly evaluated . Negative Garduno's sign.    Pancreas: Not visualized.    Right kidney: 8.6 cm. No hydronephrosis.    Ascites: None.    IVC: Visualized portions are within normal limits.    IMPRESSION:     Redemonstration of cirrhotic morphology of the liver.    The gallbladder is poorly evaluated on ultrasound, may be related to   known porcelain gallbladder.     < end of copied text >    < from: CT Abdomen and Pelvis w/ Oral Cont and w/ IV Cont (10.28.19 @ 14:20) >  IMPRESSION:     Cholelithiasis and porcelain gallbladder are again noted.    Choledocholithiasis, with mild dilatation of the common bile duct.     Cirrhotic configuration of the liver.    Minimal infiltration of the fat adjacent to the pancreatic head and   proximal duodenum, raising consideration of mild pancreatitis and/or   duodenitis..    < end of copied text >

## 2019-11-06 LAB
ALBUMIN SERPL ELPH-MCNC: 3.1 G/DL — LOW (ref 3.3–5)
ALP SERPL-CCNC: 224 U/L — HIGH (ref 40–120)
ALT FLD-CCNC: 107 U/L — HIGH (ref 4–41)
ANION GAP SERPL CALC-SCNC: 8 MMO/L — SIGNIFICANT CHANGE UP (ref 7–14)
AST SERPL-CCNC: 188 U/L — HIGH (ref 4–40)
BILIRUB SERPL-MCNC: 1.8 MG/DL — HIGH (ref 0.2–1.2)
BUN SERPL-MCNC: 21 MG/DL — SIGNIFICANT CHANGE UP (ref 7–23)
CALCIUM SERPL-MCNC: 8.7 MG/DL — SIGNIFICANT CHANGE UP (ref 8.4–10.5)
CHLORIDE SERPL-SCNC: 104 MMOL/L — SIGNIFICANT CHANGE UP (ref 98–107)
CO2 SERPL-SCNC: 23 MMOL/L — SIGNIFICANT CHANGE UP (ref 22–31)
CREAT SERPL-MCNC: 1.32 MG/DL — HIGH (ref 0.5–1.3)
GLUCOSE SERPL-MCNC: 150 MG/DL — HIGH (ref 70–99)
HCT VFR BLD CALC: 34.7 % — LOW (ref 39–50)
HGB BLD-MCNC: 11.2 G/DL — LOW (ref 13–17)
LIDOCAIN IGE QN: 100.9 U/L — HIGH (ref 7–60)
MAGNESIUM SERPL-MCNC: 1.6 MG/DL — SIGNIFICANT CHANGE UP (ref 1.6–2.6)
MCHC RBC-ENTMCNC: 25.8 PG — LOW (ref 27–34)
MCHC RBC-ENTMCNC: 32.3 % — SIGNIFICANT CHANGE UP (ref 32–36)
MCV RBC AUTO: 80 FL — SIGNIFICANT CHANGE UP (ref 80–100)
NRBC # FLD: 0 K/UL — SIGNIFICANT CHANGE UP (ref 0–0)
PHOSPHATE SERPL-MCNC: 2.5 MG/DL — SIGNIFICANT CHANGE UP (ref 2.5–4.5)
PLATELET # BLD AUTO: 163 K/UL — SIGNIFICANT CHANGE UP (ref 150–400)
PMV BLD: 10.4 FL — SIGNIFICANT CHANGE UP (ref 7–13)
POTASSIUM SERPL-MCNC: 4.1 MMOL/L — SIGNIFICANT CHANGE UP (ref 3.5–5.3)
POTASSIUM SERPL-SCNC: 4.1 MMOL/L — SIGNIFICANT CHANGE UP (ref 3.5–5.3)
PROT SERPL-MCNC: 6.3 G/DL — SIGNIFICANT CHANGE UP (ref 6–8.3)
RBC # BLD: 4.34 M/UL — SIGNIFICANT CHANGE UP (ref 4.2–5.8)
RBC # FLD: 16.6 % — HIGH (ref 10.3–14.5)
SODIUM SERPL-SCNC: 135 MMOL/L — SIGNIFICANT CHANGE UP (ref 135–145)
WBC # BLD: 7.94 K/UL — SIGNIFICANT CHANGE UP (ref 3.8–10.5)
WBC # FLD AUTO: 7.94 K/UL — SIGNIFICANT CHANGE UP (ref 3.8–10.5)

## 2019-11-06 PROCEDURE — 99233 SBSQ HOSP IP/OBS HIGH 50: CPT | Mod: GC

## 2019-11-06 PROCEDURE — 99232 SBSQ HOSP IP/OBS MODERATE 35: CPT | Mod: GC

## 2019-11-06 RX ORDER — SODIUM CHLORIDE 9 MG/ML
1000 INJECTION, SOLUTION INTRAVENOUS
Refills: 0 | Status: DISCONTINUED | OUTPATIENT
Start: 2019-11-06 | End: 2019-11-07

## 2019-11-06 RX ADMIN — Medication 10 MILLIGRAM(S): at 17:00

## 2019-11-06 RX ADMIN — LACTULOSE 15 GRAM(S): 10 SOLUTION ORAL at 05:26

## 2019-11-06 RX ADMIN — PANTOPRAZOLE SODIUM 40 MILLIGRAM(S): 20 TABLET, DELAYED RELEASE ORAL at 17:00

## 2019-11-06 RX ADMIN — Medication 1000 MILLIGRAM(S): at 05:26

## 2019-11-06 RX ADMIN — Medication 500 MILLIGRAM(S): at 17:00

## 2019-11-06 RX ADMIN — Medication 500 MILLIGRAM(S): at 05:26

## 2019-11-06 RX ADMIN — SODIUM CHLORIDE 100 MILLILITER(S): 9 INJECTION, SOLUTION INTRAVENOUS at 13:01

## 2019-11-06 RX ADMIN — Medication 1000 MILLIGRAM(S): at 17:00

## 2019-11-06 RX ADMIN — Medication 10 MILLIGRAM(S): at 05:26

## 2019-11-06 RX ADMIN — LACTULOSE 15 GRAM(S): 10 SOLUTION ORAL at 17:00

## 2019-11-06 RX ADMIN — PANTOPRAZOLE SODIUM 40 MILLIGRAM(S): 20 TABLET, DELAYED RELEASE ORAL at 05:26

## 2019-11-06 NOTE — PROGRESS NOTE ADULT - ASSESSMENT
Impression:  79 year old man from home, PMH of [facility] (1996, hospitalized for 4 months), decompensated alcoholic cirrhosis (varices: 5/27/18; no ascites per CT; encephalopathy: on lactulose; ?HCC: concerning lesion seen on MR; off transplant list 2/2 age), porcelain gallbladder, SMA stenosis, s/p b/l inguinal hernia repair, presenting with diffuse abdominal pain x 2 days.      - Choledocholithiasis s/p  ERCP with stone removal  - Cholelithiasis and porcelain gallbladder  - Multiple deep clean based ulcers in duodenal bulb  - Hematochezia, currently HD stable and Hb 14, s/p colonoscopy with hemorrhoids  - Cirrhosis     Recs:  - advance diet as tolerated today  - repeat CBC, CMP, INR this afternoon given increase in labs this morning  - surgery consult for eventual cholecystectomy if she is a candidate  - supportive care as per primary team

## 2019-11-06 NOTE — PROGRESS NOTE ADULT - PROBLEM SELECTOR PLAN 4
- chronic, present on previous scan  - liver and pancreatic enzymes low at this time  - ERCP today - chronic, present on previous scan  - ERCP done with stone extraction

## 2019-11-06 NOTE — PROGRESS NOTE ADULT - PROBLEM SELECTOR PLAN 8
Decompensated ETOH Cirrhosis, previously on transplant list but no longer  MELD-Na 20. 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 No ascities on CT. 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  Liver lesion on MR concerning for HCC, repeat MR inconclusive, being actively followed as outpatient.   - Mental status at baseline per wife, currently AOx3 (but states he has been getting forgetful with age)   - Trend MELD labs  - Propranolol 20 mg bid  - no BM yesterday, will increase lactulose to 15 g BID  - ammonia wnl Decompensated ETOH Cirrhosis, previously on transplant list but no longer  MELD-Na 20. 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 No ascities on CT. 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  Liver lesion on MR concerning for HCC, repeat MR inconclusive, being actively followed as outpatient.   - Mental status at baseline per wife, currently AOx3 (but states he has been getting forgetful with age)   - Trend MELD labs  - Propranolol 20 mg bid  - ammonia wnl

## 2019-11-06 NOTE — PROGRESS NOTE ADULT - SUBJECTIVE AND OBJECTIVE BOX
Matt Rivas MD, PhD  PGY1  [phone] / [phone]    Interval Hx / Subjective:     ROS: as above     MEDICATIONS  (STANDING):  amoxicillin 1000 milliGRAM(s) Oral two times a day  clarithromycin 500 milliGRAM(s) Oral two times a day  influenza   Vaccine 0.5 milliLiter(s) IntraMuscular once  lactulose Syrup 15 Gram(s) Oral two times a day  pantoprazole    Tablet 40 milliGRAM(s) Oral two times a day  propranolol 10 milliGRAM(s) Oral two times a day    MEDICATIONS  (PRN):  acetaminophen   Tablet .. 650 milliGRAM(s) Oral every 6 hours PRN Mild Pain (1 - 3), Moderate Pain (4 - 6)      Objective:  Vital Signs Last 24 Hrs  T(C): 36.7 (06 Nov 2019 04:37), Max: 36.7 (06 Nov 2019 04:37)  T(F): 98.1 (06 Nov 2019 04:37), Max: 98.1 (06 Nov 2019 04:37)  HR: 86 (06 Nov 2019 04:37) (58 - 86)  BP: 107/63 (06 Nov 2019 04:37) (100/67 - 147/73)  BP(mean): --  RR: 16 (06 Nov 2019 04:37) (13 - 25)  SpO2: 98% (06 Nov 2019 04:37) (95% - 100%)    Physical Exam:  GEN:   HEENT: PERRL, EOMI; mucous moist; neck supple without LAD  CV: RRR, S1, S2; no M/R/G; good capillary refill  PULM: LCTAB; not using accessory muscles  ABD: normal bowel sounds; non-distended, soft; non-tender; no rebound, no guarding  BACK: no CVA tenderness  Extremities: no clubbing, cyanosis; no edema; DP and radial pulses palpable  NEURO: AAOx3; no FND               11.2   7.94  )-----------( 163      ( 11-06 @ 06:20 )             34.7                11.3   6.20  )-----------( 195      ( 11-05 @ 05:20 )             35.7                11.5   7.13  )-----------( 180      ( 11-04 @ 05:46 )             36.5         11-06    135  |  104  |  21  ----------------------------<  150<H>  4.1   |  23  |  1.32<H>    Ca    8.7      06 Nov 2019 06:20  Phos  2.5     11-06  Mg     1.6     11-06    TPro  6.3  /  Alb  3.1<L>  /  TBili  1.8<H>  /  DBili  x   /  AST  188<H>  /  ALT  107<H>  /  AlkPhos  224<H>  11-06    PT/INR - ( 05 Nov 2019 05:20 )   PT: 17.7 SEC;   INR: 1.57     PTT - ( 05 Nov 2019 05:20 )  PTT:32.4 SEC Matt Rivas MD, PhD  PGY1  [phone] / [phone]    Interval Hx / Subjective: no acute issue o/n; pt continues to deny abd pain at rest but tenderness when his abdomen is pressed; denied N/V/D/C, CP, SOB, HA, lightheadedness, dysuria; endorses a couple of BMs this AM, light colored, loose    ROS: as above     MEDICATIONS  (STANDING):  amoxicillin 1000 milliGRAM(s) Oral two times a day  clarithromycin 500 milliGRAM(s) Oral two times a day  influenza   Vaccine 0.5 milliLiter(s) IntraMuscular once  lactulose Syrup 15 Gram(s) Oral two times a day  pantoprazole    Tablet 40 milliGRAM(s) Oral two times a day  propranolol 10 milliGRAM(s) Oral two times a day    MEDICATIONS  (PRN):  acetaminophen   Tablet .. 650 milliGRAM(s) Oral every 6 hours PRN Mild Pain (1 - 3), Moderate Pain (4 - 6)      Objective:  Vital Signs Last 24 Hrs  T(C): 36.7 (06 Nov 2019 04:37), Max: 36.7 (06 Nov 2019 04:37)  T(F): 98.1 (06 Nov 2019 04:37), Max: 98.1 (06 Nov 2019 04:37)  HR: 86 (06 Nov 2019 04:37) (58 - 86)  BP: 107/63 (06 Nov 2019 04:37) (100/67 - 147/73)  BP(mean): --  RR: 16 (06 Nov 2019 04:37) (13 - 25)  SpO2: 98% (06 Nov 2019 04:37) (95% - 100%)    Physical Exam:  GEN: sitting in bed, eating breakfast cereal, NAD  CV: RRR, S1, slurred S2, heart sounds better appreciated when pt fully seated up; no M/R/G; good capillary refill  PULM: LCTAB; not using accessory muscles  ABD: diffuse upper abdominal tenderness to light palpation, mild guarding RUQ; normal BS; not distended; no rebound   BACK: no CVA tenderness  Extremities: no clubbing, cyanosis; no edema; DP and radial pulses palpable  NEURO: awake and ; no FND               11.2   7.94  )-----------( 163      ( 11-06 @ 06:20 )             34.7                11.3   6.20  )-----------( 195      ( 11-05 @ 05:20 )             35.7                11.5   7.13  )-----------( 180      ( 11-04 @ 05:46 )             36.5     11-06    135  |  104  |  21  ----------------------------<  150<H>  4.1   |  23  |  1.32<H>    Ca    8.7      06 Nov 2019 06:20  Phos  2.5     11-06  Mg     1.6     11-06    TPro  6.3  /  Alb  3.1<L>  /  TBili  1.8<H>  /  DBili  x   /  AST  188<H>  /  ALT  107<H>  /  AlkPhos  224<H>  11-06    PT/INR - ( 05 Nov 2019 05:20 )   PT: 17.7 SEC;   INR: 1.57     PTT - ( 05 Nov 2019 05:20 )  PTT:32.4 SEC Matt Rivas MD, PhD  PGY1  [phone] / [phone]    Interval Hx / Subjective: no acute issue o/n; pt continues to deny abd pain at rest but tenderness when his abdomen is pressed; denied N/V/D/C, CP, SOB, HA, lightheadedness, dysuria; endorses a couple of BMs this AM, light colored, loose    ROS: as above     MEDICATIONS  (STANDING):  amoxicillin 1000 milliGRAM(s) Oral two times a day  clarithromycin 500 milliGRAM(s) Oral two times a day  influenza   Vaccine 0.5 milliLiter(s) IntraMuscular once  lactulose Syrup 15 Gram(s) Oral two times a day  pantoprazole    Tablet 40 milliGRAM(s) Oral two times a day  propranolol 10 milliGRAM(s) Oral two times a day    MEDICATIONS  (PRN):  acetaminophen   Tablet .. 650 milliGRAM(s) Oral every 6 hours PRN Mild Pain (1 - 3), Moderate Pain (4 - 6)    Objective:  Vital Signs Last 24 Hrs  T(C): 36.7 (06 Nov 2019 04:37), Max: 36.7 (06 Nov 2019 04:37)  T(F): 98.1 (06 Nov 2019 04:37), Max: 98.1 (06 Nov 2019 04:37)  HR: 86 (06 Nov 2019 04:37) (58 - 86)  BP: 107/63 (06 Nov 2019 04:37) (100/67 - 147/73)  BP(mean): --  RR: 16 (06 Nov 2019 04:37) (13 - 25)  SpO2: 98% (06 Nov 2019 04:37) (95% - 100%)    Physical Exam:  GEN: sitting in bed, eating breakfast cereal, NAD  CV: RRR, S1, slurred S2, heart sounds better appreciated when pt fully seated up; no M/R/G; good capillary refill  PULM: LCTAB; not using accessory muscles  ABD: diffuse upper abdominal tenderness to light palpation, mild guarding RUQ; normal BS; not distended; no rebound   BACK: no CVA tenderness  Extremities: no clubbing, cyanosis; no edema; DP and radial pulses palpable  NEURO: awake and ; no FND               11.2   7.94  )-----------( 163      ( 11-06 @ 06:20 )             34.7                11.3   6.20  )-----------( 195      ( 11-05 @ 05:20 )             35.7                11.5   7.13  )-----------( 180      ( 11-04 @ 05:46 )             36.5     11-06    135  |  104  |  21  ----------------------------<  150<H>  4.1   |  23  |  1.32<H>    Ca    8.7      06 Nov 2019 06:20  Phos  2.5     11-06  Mg     1.6     11-06    TPro  6.3  /  Alb  3.1<L>  /  TBili  1.8<H>  /  DBili  x   /  AST  188<H>  /  ALT  107<H>  /  AlkPhos  224<H>  11-06    Lipase, Serum (11.06.19 @ 06:20)    Lipase, Serum: 100.9 U/L    PT/INR - ( 05 Nov 2019 05:20 )   PT: 17.7 SEC;   INR: 1.57     PTT - ( 05 Nov 2019 05:20 )  PTT:32.4 SEC

## 2019-11-06 NOTE — PROGRESS NOTE ADULT - PROBLEM SELECTOR PLAN 1
- Colicky upper abd pain +/- associated w/ food  - ddx includes PUD vs choledocholithiasis, pancreatitis, duodenitis, mesenteric ischemia  - CT A/P shows cholelithiasis, choledocholithiasis w/ CBD dilitation; neg pancreatitis    - given hematochezia this admission, c/f bleeding PUD  - s/p colonoscopy (10/31)  - low likelihood of ischemic abdominal pathology at this time  - Plan for ERCP today for stone extraction   - c/w triple therapy for H. pylori, day 5 of 14 - Colicky upper abd pain +/- associated w/ food  - ddx includes PUD vs choledocholithiasis, pancreatitis, duodenitis, mesenteric ischemia  - CT A/P shows cholelithiasis, choledocholithiasis w/ CBD dilitation; neg pancreatitis    - given hematochezia this admission, c/f bleeding PUD  - s/p colonoscopy (10/31)  - low likelihood of ischemic abdominal pathology at this time  - s/p ERCP, stent placement and stone removal  - mild transaminitis w/ elevated alk phos and lipase 2/2 to ERCP  - c/w triple therapy for H. pylori, day 5 of 14

## 2019-11-06 NOTE — PROGRESS NOTE ADULT - ASSESSMENT
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  Admitted with SIRS possibly 2/2 PUD (H. pylori+ on previous EGD never started on triple therapy), vs choledocholithiasis vs. pancreatitis and acute blood loss anemia likely from PUD vs varices.  Course c/b asymptomatic E. coli bacteruria.  Currently on triple therapy for H. pylori found on previous EGD bx, day 3 of 14.  s/p ERCP w/ stent placement and stone removal, now w/ mildly elevated LFTs

## 2019-11-06 NOTE — PROGRESS NOTE ADULT - SUBJECTIVE AND OBJECTIVE BOX
Chief Complaint:  Patient is a 79y old  Male who presents with a chief complaint of Abdominal Pain (06 Nov 2019 09:02)      Interval Events: Patient tolerated ERCP yesterday with stone removal.  Today LFTs slightly up.     Allergies:  No Known Allergies      Hospital Medications:  acetaminophen   Tablet .. 650 milliGRAM(s) Oral every 6 hours PRN  amoxicillin 1000 milliGRAM(s) Oral two times a day  clarithromycin 500 milliGRAM(s) Oral two times a day  influenza   Vaccine 0.5 milliLiter(s) IntraMuscular once  lactulose Syrup 15 Gram(s) Oral two times a day  pantoprazole    Tablet 40 milliGRAM(s) Oral two times a day  propranolol 10 milliGRAM(s) Oral two times a day      PMHX/PSHX:  Liver cirrhosis  Guillain-[place] syndrome  HTN (hypertension)  H/O inguinal hernia repair      Family history:  Family history of ovarian cancer (Sibling)      ROS:     General:  No wt loss, fevers, chills, night sweats, fatigue,   Eyes:  Good vision, no reported pain  ENT:  No sore throat, pain, runny nose, dysphagia  CV:  No pain, palpitations, hypo/hypertension  Resp:  No dyspnea, cough, tachypnea, wheezing  GI:  See HPI  :  No pain, bleeding, incontinence, nocturia  Muscle:  No pain, weakness  Neuro:  No weakness, tingling, memory problems  Psych:  No fatigue, insomnia, mood problems, depression  Endocrine:  No polyuria, polydipsia, cold/heat intolerance  Heme:  No petechiae, ecchymosis, easy bruisability  Skin:  No rash, edema      PHYSICAL EXAM:     GENERAL: NAD  HEENT:  NC/AT  CHEST:  Full & symmetric excursion, no increased effort  ABDOMEN:  Soft, non-tender, non-distended, +BS  EXTREMITIES:  no edema  SKIN:  No rash  NEURO:  Alert      Vital Signs:  Vital Signs Last 24 Hrs  T(C): 36.7 (06 Nov 2019 04:37), Max: 36.7 (06 Nov 2019 04:37)  T(F): 98.1 (06 Nov 2019 04:37), Max: 98.1 (06 Nov 2019 04:37)  HR: 86 (06 Nov 2019 04:37) (58 - 86)  BP: 107/63 (06 Nov 2019 04:37) (100/67 - 143/67)  BP(mean): --  RR: 16 (06 Nov 2019 04:37) (13 - 25)  SpO2: 98% (06 Nov 2019 04:37) (95% - 100%)  Daily     Daily     LABS:                        11.2   7.94  )-----------( 163      ( 06 Nov 2019 06:20 )             34.7     11-06    135  |  104  |  21  ----------------------------<  150<H>  4.1   |  23  |  1.32<H>    Ca    8.7      06 Nov 2019 06:20  Phos  2.5     11-06  Mg     1.6     11-06    TPro  6.3  /  Alb  3.1<L>  /  TBili  1.8<H>  /  DBili  x   /  AST  188<H>  /  ALT  107<H>  /  AlkPhos  224<H>  11-06    LIVER FUNCTIONS - ( 06 Nov 2019 06:20 )  Alb: 3.1 g/dL / Pro: 6.3 g/dL / ALK PHOS: 224 u/L / ALT: 107 u/L / AST: 188 u/L / GGT: x           PT/INR - ( 05 Nov 2019 05:20 )   PT: 17.7 SEC;   INR: 1.57          PTT - ( 05 Nov 2019 05:20 )  PTT:32.4 SEC    Amylase Serum--      Lipase sldip208.9       Ammonia--      Imaging:  < from: ERCP (11.05.19 @ 10:57) >    [facility]  _______________________________________________________________________________  Patient Name: Anthony Florez            Procedure Date: 11/5/2019 10:57 AM  MRN: [id number]                     Account Number: [id number]  YOB: 1940              Admit Type: Inpatient  Room: [place] [address]                         Gender: Male  Attending MD: AUGUSTINA GARRETT MD      _______________________________________________________________________________     Procedure:           ERCP  Indications:         79 year old male with a CBD stone.  Providers:           AUGUSTINA GARRETT MD, ABDIAS NORRIS (Fellow)  Medicines:           General Anesthesia                       Indomethacin 100 mg WV                       IVF                       Cipro 400 mg IV                       Fluoro: 2.9 min/21.7 mGy/83 kV  Complications:       No immediate complications.  Procedure:           Pre-Anesthesia Assessment:                       - Prior to the procedure, a History and Physical was                        performed, and patient medications, allergies and                        sensitivities were reviewed. The patient's tolerance of                        previous anesthesia was reviewed.                       - Therisks (infection, bleeding, perforation,                        anesthesia related complication, pancreatitis if                        appropriate, missed lesions etc), benefits, alternatives                        explained to the patient and family. The patient agreed                        to the procedure.                       After obtaining informed consent, the scope was passed                        under direct vision. Throughout the procedure, the                        patient's blood pressure, pulse, and oxygen saturations                        were monitored continuously. The ERCP was introduced                        through the mouth, and advanced to the duodenum and used                        to inject contrast into the bile duct and ventral                        pancreatic duct. The Endoscope was introduced through                        the and advanced to the duodenum. The was introduced                        through the and advanced to the duodenum. The ERCP was                        accomplished with ease. The patient tolerated the                        procedure well.                                                                                   Findings:       EGD:       -The esophagus was normal.    -The stomach was normal.       -The duodenal bulb and sweep contained multiple deep clean based ulcers.       ERCP:       -The  film was normal.       -The duodenoscope was passed to the ampulla.       -The ampulla was normal appearing.       -The PD was cannulated while trying to cannulate the bile duct. The PD        was not injected.       -Double wire technique failed to cannulate the bile duct and thus a        5Fr-4 cm single pigtail stent was placed in the PD.       -Biliary cannulation was successful after PD stent placement using a Rx        39 preloaded sphincterotome.       -Contrast was injected into the bile duct. I acquired and interpreted        the fluoroscopic images. Images downloaded to PACS. There was a dilated     CBD with a round filling object.       -A biliary sphincterotomy was performed.       -A balloon catheter was used to remove the stone.       -An occlusion cholangiogram showed no more stones remained.       -There was excellent flow of bile and contrast from the bile duct.                                                                                   Impression:          EGD:                       -The esophagus was normal.                       -The stomach was normal.   -The duodenal bulb and sweep contained multiple deep                        clean based ulcers.                       ERCP:                       -The  film was normal.                       -The duodenoscope was passed to the ampulla.               -The ampulla was normal appearing.                       -The PD was cannulated while trying to cannulate the                        bile duct. The PD was not injected.                       -Double wire technique failed to cannulate thebile duct                        and thus a 5Fr-4 cm single pigtail stent was placed in                        the PD.                       -Biliary cannulation was successful after PD stent                        placement using a Rx 39 preloaded sphincterotome.                       -Contrast was injected into the bile duct. I acquired                        and interpreted the fluoroscopic images. Images                        downloaded to PACS. There was a dilated CBD with a round                filling object.                       -A biliary sphincterotomy was performed.                       -A balloon catheter was used to remove the stone.                       -An occlusion cholangiogram showed no more stones           remained.                       -There was excellent flow of bile and contrast from the                        bile duct.  Recommendation:      - [facility].                       - NPO     - IVF                       - Check serum or stool H. pylori. If positive please                        treat.                       - AXR in 4 weeks to see if the PD stent is still present.                           Attending Participation:       I was present and participated during the entire procedure, including        non-key portions.                                                                                     ___________________  AUGUSTINA GARRETT MD  11/5/2019 1:58:06 PM  This report has been signed electronically.  Number of Addenda: 0    Note Initiated On: 11/5/2019 10:57 AM    < end of copied text >

## 2019-11-06 NOTE — PROGRESS NOTE ADULT - SUBJECTIVE AND OBJECTIVE BOX
ANESTHESIA POSTOP CHECK    79y Male POSTOP DAY 1 S/P     Vital Signs Last 24 Hrs  T(C): 36.7 (06 Nov 2019 04:37), Max: 36.7 (06 Nov 2019 04:37)  T(F): 98.1 (06 Nov 2019 04:37), Max: 98.1 (06 Nov 2019 04:37)  HR: 86 (06 Nov 2019 04:37) (58 - 86)  BP: 107/63 (06 Nov 2019 04:37) (100/67 - 147/73)  BP(mean): --  RR: 16 (06 Nov 2019 04:37) (13 - 25)  SpO2: 98% (06 Nov 2019 04:37) (95% - 100%)    [x ] NO APPARENT ANESTHESIA COMPLICATIONS      Comments:

## 2019-11-07 ENCOUNTER — TRANSCRIPTION ENCOUNTER (OUTPATIENT)
Age: 79
End: 2019-11-07

## 2019-11-07 DIAGNOSIS — E43 UNSPECIFIED SEVERE PROTEIN-CALORIE MALNUTRITION: ICD-10-CM

## 2019-11-07 LAB
ALBUMIN SERPL ELPH-MCNC: 2.7 G/DL — LOW (ref 3.3–5)
ALP SERPL-CCNC: 215 U/L — HIGH (ref 40–120)
ALT FLD-CCNC: 90 U/L — HIGH (ref 4–41)
ANION GAP SERPL CALC-SCNC: 11 MMO/L — SIGNIFICANT CHANGE UP (ref 7–14)
APTT BLD: 28.3 SEC — SIGNIFICANT CHANGE UP (ref 27.5–36.3)
AST SERPL-CCNC: 110 U/L — HIGH (ref 4–40)
BILIRUB SERPL-MCNC: 1.7 MG/DL — HIGH (ref 0.2–1.2)
BUN SERPL-MCNC: 14 MG/DL — SIGNIFICANT CHANGE UP (ref 7–23)
CALCIUM SERPL-MCNC: 8.6 MG/DL — SIGNIFICANT CHANGE UP (ref 8.4–10.5)
CHLORIDE SERPL-SCNC: 103 MMOL/L — SIGNIFICANT CHANGE UP (ref 98–107)
CO2 SERPL-SCNC: 22 MMOL/L — SIGNIFICANT CHANGE UP (ref 22–31)
CREAT SERPL-MCNC: 1.37 MG/DL — HIGH (ref 0.5–1.3)
GLUCOSE SERPL-MCNC: 106 MG/DL — HIGH (ref 70–99)
HCT VFR BLD CALC: 31.8 % — LOW (ref 39–50)
HGB BLD-MCNC: 10.1 G/DL — LOW (ref 13–17)
INR BLD: 2.18 — HIGH (ref 0.88–1.17)
INR BLD: 2.4 — HIGH (ref 0.88–1.17)
LIDOCAIN IGE QN: 78.6 U/L — HIGH (ref 7–60)
MAGNESIUM SERPL-MCNC: 1.5 MG/DL — LOW (ref 1.6–2.6)
MCHC RBC-ENTMCNC: 25.5 PG — LOW (ref 27–34)
MCHC RBC-ENTMCNC: 31.8 % — LOW (ref 32–36)
MCV RBC AUTO: 80.3 FL — SIGNIFICANT CHANGE UP (ref 80–100)
NRBC # FLD: 0 K/UL — SIGNIFICANT CHANGE UP (ref 0–0)
PHOSPHATE SERPL-MCNC: 1.9 MG/DL — LOW (ref 2.5–4.5)
PLATELET # BLD AUTO: 149 K/UL — LOW (ref 150–400)
PMV BLD: 10.5 FL — SIGNIFICANT CHANGE UP (ref 7–13)
POTASSIUM SERPL-MCNC: 4 MMOL/L — SIGNIFICANT CHANGE UP (ref 3.5–5.3)
POTASSIUM SERPL-SCNC: 4 MMOL/L — SIGNIFICANT CHANGE UP (ref 3.5–5.3)
PROT SERPL-MCNC: 5.8 G/DL — LOW (ref 6–8.3)
PROTHROM AB SERPL-ACNC: 25.4 SEC — HIGH (ref 9.8–13.1)
PROTHROM AB SERPL-ACNC: 27.4 SEC — HIGH (ref 9.8–13.1)
RBC # BLD: 3.96 M/UL — LOW (ref 4.2–5.8)
RBC # FLD: 16.6 % — HIGH (ref 10.3–14.5)
SODIUM SERPL-SCNC: 136 MMOL/L — SIGNIFICANT CHANGE UP (ref 135–145)
WBC # BLD: 7.12 K/UL — SIGNIFICANT CHANGE UP (ref 3.8–10.5)
WBC # FLD AUTO: 7.12 K/UL — SIGNIFICANT CHANGE UP (ref 3.8–10.5)

## 2019-11-07 PROCEDURE — 99232 SBSQ HOSP IP/OBS MODERATE 35: CPT | Mod: GC

## 2019-11-07 RX ORDER — SODIUM,POTASSIUM PHOSPHATES 278-250MG
1 POWDER IN PACKET (EA) ORAL
Refills: 0 | Status: COMPLETED | OUTPATIENT
Start: 2019-11-07 | End: 2019-11-08

## 2019-11-07 RX ORDER — AMOXICILLIN 250 MG/5ML
2 SUSPENSION, RECONSTITUTED, ORAL (ML) ORAL
Qty: 0 | Refills: 0 | DISCHARGE
Start: 2019-11-07

## 2019-11-07 RX ORDER — FAMOTIDINE 10 MG/ML
20 INJECTION INTRAVENOUS DAILY
Refills: 0 | Status: DISCONTINUED | OUTPATIENT
Start: 2019-11-07 | End: 2019-11-08

## 2019-11-07 RX ORDER — LACTULOSE 10 G/15ML
0 SOLUTION ORAL
Qty: 0 | Refills: 0 | DISCHARGE

## 2019-11-07 RX ORDER — LACTULOSE 10 G/15ML
22.5 SOLUTION ORAL
Qty: 0 | Refills: 0 | DISCHARGE
Start: 2019-11-07

## 2019-11-07 RX ORDER — CLARITHROMYCIN 500 MG
1 TABLET ORAL
Qty: 0 | Refills: 0 | DISCHARGE
Start: 2019-11-07

## 2019-11-07 RX ORDER — FAMOTIDINE 10 MG/ML
1 INJECTION INTRAVENOUS
Qty: 0 | Refills: 0 | DISCHARGE
Start: 2019-11-07

## 2019-11-07 RX ORDER — PANTOPRAZOLE SODIUM 20 MG/1
1 TABLET, DELAYED RELEASE ORAL
Qty: 0 | Refills: 0 | DISCHARGE
Start: 2019-11-07

## 2019-11-07 RX ORDER — SODIUM,POTASSIUM PHOSPHATES 278-250MG
1 POWDER IN PACKET (EA) ORAL
Qty: 0 | Refills: 0 | DISCHARGE
Start: 2019-11-07

## 2019-11-07 RX ORDER — ACETAMINOPHEN 500 MG
2 TABLET ORAL
Qty: 0 | Refills: 0 | DISCHARGE
Start: 2019-11-07

## 2019-11-07 RX ORDER — ONDANSETRON 8 MG/1
4 TABLET, FILM COATED ORAL ONCE
Refills: 0 | Status: COMPLETED | OUTPATIENT
Start: 2019-11-07 | End: 2019-11-07

## 2019-11-07 RX ORDER — MAGNESIUM SULFATE 500 MG/ML
2 VIAL (ML) INJECTION ONCE
Refills: 0 | Status: COMPLETED | OUTPATIENT
Start: 2019-11-07 | End: 2019-11-07

## 2019-11-07 RX ADMIN — Medication 500 MILLIGRAM(S): at 18:42

## 2019-11-07 RX ADMIN — LACTULOSE 15 GRAM(S): 10 SOLUTION ORAL at 05:20

## 2019-11-07 RX ADMIN — ONDANSETRON 4 MILLIGRAM(S): 8 TABLET, FILM COATED ORAL at 16:11

## 2019-11-07 RX ADMIN — Medication 10 MILLIGRAM(S): at 18:42

## 2019-11-07 RX ADMIN — SODIUM CHLORIDE 100 MILLILITER(S): 9 INJECTION, SOLUTION INTRAVENOUS at 01:10

## 2019-11-07 RX ADMIN — Medication 500 MILLIGRAM(S): at 05:21

## 2019-11-07 RX ADMIN — Medication 50 GRAM(S): at 10:54

## 2019-11-07 RX ADMIN — Medication 1 TABLET(S): at 15:09

## 2019-11-07 RX ADMIN — PANTOPRAZOLE SODIUM 40 MILLIGRAM(S): 20 TABLET, DELAYED RELEASE ORAL at 05:21

## 2019-11-07 RX ADMIN — Medication 1000 MILLIGRAM(S): at 18:55

## 2019-11-07 RX ADMIN — FAMOTIDINE 20 MILLIGRAM(S): 10 INJECTION INTRAVENOUS at 18:42

## 2019-11-07 RX ADMIN — Medication 1000 MILLIGRAM(S): at 05:21

## 2019-11-07 RX ADMIN — PANTOPRAZOLE SODIUM 40 MILLIGRAM(S): 20 TABLET, DELAYED RELEASE ORAL at 18:42

## 2019-11-07 RX ADMIN — Medication 1 TABLET(S): at 10:56

## 2019-11-07 NOTE — DISCHARGE NOTE PROVIDER - CARE PROVIDER_API CALL
Matthew Samson)  Gastroenterology; Internal Medicine  [address] [place] [address] [address]  [place] [address]  Phone: [phone]  Fax: [phone]  Follow Up Time: Matthew Samson)  Gastroenterology; Internal Medicine  [address] [place] [address] [address]  [place] [address]  Phone: [phone]  Fax: [phone]  Follow Up Time:     Richie Mcgraw)  Internal Medicine  [address] [place] [address]  Phone: [phone]  Fax: [phone]  Follow Up Time:

## 2019-11-07 NOTE — DIETITIAN INITIAL EVALUATION ADULT. - PERTINENT MEDS FT
MEDICATIONS  (STANDING):  amoxicillin 1000 milliGRAM(s) Oral two times a day  clarithromycin 500 milliGRAM(s) Oral two times a day  influenza   Vaccine 0.5 milliLiter(s) IntraMuscular once  lactulose Syrup 15 Gram(s) Oral two times a day  pantoprazole    Tablet 40 milliGRAM(s) Oral two times a day  potassium acid phosphate/sodium acid phosphate tablet (K-PHOS No. 2) 1 Tablet(s) Oral four times a day with meals  propranolol 10 milliGRAM(s) Oral two times a day

## 2019-11-07 NOTE — DISCHARGE NOTE PROVIDER - HOSPITAL COURSE
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  Admitted with SIRS possibly 2/2 PUD (H. pylori+ on previous EGD never started on triple therapy), vs choledocholithiasis vs. pancreatitis and acute blood loss anemia likely from PUD.  CT A/P showed chronic choledocholithiasis.  GI, advance GI, and hepatology were consulted.  Pt reported episodes of dark stool during early part of hospitalization; given pt's hx of alcoholic liver cirrhosis c/b varices s/p banding w/ repeat negative EGD.  Given rate of Hb decrease, hepatology felt it was less likely the pt had a variceal bleed.  Colonoscopy revealed AVM which were cauterized, a polyp which was removed and biopsy sent, and internal hemorrhoids likely responsible for the bleed.   He also underwent an ERCP w/ stent placement and stone removal.  ERCP also showed peptide ulcers consistent with previous output EGD.  Pt was started on triple therapy based on bx positive for H. pylori on last out pt EGD.  ERCP was c/b post-ERCP biliary trauma with elevated AST/ALT/Alk Phos and lipase.  He tolerated regular diet.  He was medically stable and appropriate for discharge to subacute rehab.  He will need to finish the rest of his 14d course of H. pylori triple therapy.

## 2019-11-07 NOTE — PROGRESS NOTE ADULT - PROBLEM SELECTOR PLAN 8
Decompensated ETOH Cirrhosis, previously on transplant list but no longer  MELD-Na 20. 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 No ascities on CT. 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  Liver lesion on MR concerning for HCC, repeat MR inconclusive, being actively followed as outpatient.   - Mental status at baseline per wife, currently AOx3 (but states he has been getting forgetful with age)   - Trend MELD labs  - Propranolol 20 mg bid  - ammonia wnl

## 2019-11-07 NOTE — DIETITIAN INITIAL EVALUATION ADULT. - PROBLEM SELECTOR PLAN 8
Decompensated ETOH Cirrhosis, previously on transplant list but no longer  MELD-Na 20. 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 No ascities on CT. 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  Liver lesion on MR concerning for HCC, repeat MR inconclusive.  - Mental status at baseline per wife, currently AOx3 (but states he has been getting forgetful with age)   - Trend MELD labs  - Propranolol 20 mg bid  - C/w lactulose 20g QHS  - F/u Ammonia

## 2019-11-07 NOTE — DISCHARGE NOTE PROVIDER - NSDCMRMEDTOKEN_GEN_ALL_CORE_FT
acetaminophen 325 mg oral tablet: 2 tab(s) orally every 6 hours, As needed, Mild Pain (1 - 3), Moderate Pain (4 - 6)  amoxicillin 500 mg oral capsule: 2 cap(s) orally 2 times a day  Calcium 500+D:   clarithromycin 500 mg oral tablet: 1 tab(s) orally 2 times a day  famotidine 20 mg oral tablet: 1 tab(s) orally once a day  lactulose 10 g/15 mL oral syrup: 22.5 milliliter(s) orally 2 times a day  pantoprazole 40 mg oral delayed release tablet: 1 tab(s) orally 2 times a day  propranolol 10 mg oral tablet: 1 tab(s) orally 2 times a day  Vitamin D3: acetaminophen 325 mg oral tablet: 2 tab(s) orally every 6 hours, As needed, Mild Pain (1 - 3), Moderate Pain (4 - 6)  amoxicillin 500 mg oral capsule: 2 cap(s) orally 2 times a day until 11/14  Calcium 500+D:   clarithromycin 500 mg oral tablet: 1 tab(s) orally 2 times a day until 11/14  famotidine 20 mg oral tablet: 1 tab(s) orally once a day  lactulose 10 g/15 mL oral syrup: 22.5 milliliter(s) orally 2 times a day  pantoprazole 40 mg oral delayed release tablet: 1 tab(s) orally 2 times a day  propranolol 10 mg oral tablet: 1 tab(s) orally 2 times a day  Vitamin D3:

## 2019-11-07 NOTE — DIETITIAN INITIAL EVALUATION ADULT. - PROBLEM SELECTOR PLAN 2
Leukocytosis and tachycardia to 130 on admission.  Lactate 4.4 now downtrending s/p IVF.  Etiology unclear. Lungs clear on CXR, blood and urine cultures pending.  Pt remains afebrile.   Starting empiric abx with zosyn    IVF at 100cc/hr  Trend lactate q6hr

## 2019-11-07 NOTE — CHART NOTE - NSCHARTNOTEFT_GEN_A_CORE
NUTRITION SERVICES     Upon Nutritional Assessment by the Registered Dietitian your patient was determined to meet criteria/ has evidence of the following diagnosis/diagnoses:  [ ] Mild Protein Calorie Malnutrition   [ ] Moderate Protein Calorie Malnutrition   [X] Severe Protein Calorie Malnutrition   [ ] Unspecified Protein Calorie Malnutrition   [ ] Underweight / BMI <19  [ ] Morbid Obesity / BMI >40    Findings as based on:  •  Comprehensive nutritional assessment and consultation    Please refer to Initial Dietitian Evaluation via documents section of [facility] for further recommendations.    Tutu Gutierrez RD,CD/N,CDE

## 2019-11-07 NOTE — DIETITIAN INITIAL EVALUATION ADULT. - PROBLEM SELECTOR PLAN 1
Etiology unclear. No peritoneal signs. Pt denies BM or flatus in 2 day but no evidence of bowel obstruction in CTAP.  DDx includes pancreatitis vs choledocholithiasis vs duodenitis vs mesenteric ischemia.    - NPO except medications for now  - Mesenteric duplex given hx of SMA stenosis and lactate   - Can discuss with GI if MRCP is necessary to further characterize choledocholithiasis prior to ERCP  - GI consult for possible ERCP and stone extraction

## 2019-11-07 NOTE — PROGRESS NOTE ADULT - PROBLEM SELECTOR PLAN 9
DVT: pharm ppx on hold in setting of melena  Diet: clears at present; regular diet  Dispo: pending subacute rehab Appreciate nutrition recs

## 2019-11-07 NOTE — PROGRESS NOTE ADULT - ASSESSMENT
Impression:  79 year old man from home, PMH of [facility] (1996, hospitalized for 4 months), decompensated alcoholic cirrhosis (varices: 5/27/18; no ascites per CT; encephalopathy: on lactulose; ?HCC: concerning lesion seen on MR; off transplant list 2/2 age), porcelain gallbladder, SMA stenosis, s/p b/l inguinal hernia repair, presenting with diffuse abdominal pain x 2 days.      - Choledocholithiasis s/p  ERCP with stone removal  - Cholelithiasis and porcelain gallbladder  - Multiple deep clean based ulcers in duodenal bulb  - Hematochezia, currently HD stable and Hb 14, s/p colonoscopy with hemorrhoids  - Cirrhosis     Recs:  - repeat CBC, CMP, INR  - diet as tolerated  - surgery consult for eventual cholecystectomy if she is a candidate  - supportive care as per primary team

## 2019-11-07 NOTE — DISCHARGE NOTE PROVIDER - NSDCCPCAREPLAN_GEN_ALL_CORE_FT
PRINCIPAL DISCHARGE DIAGNOSIS  Diagnosis: Abdominal pain  Assessment and Plan of Treatment: You came into the hospital with abdominal pain.  You had multiple reasons to have abdominal pain.  An EGD previously performed showed gastritis, peptic ulcer disease, and H. pylori infection by biopsy.  You were suppose to start antimicrobial therapy for H. pylori but did not.  Further, you have a hx of gallstones, which were seen again on a CAT scan of your abdomen.  The repeat scan showed a stone in the common bile duct which did block it enough to make the duct appear bigger.  During your hospitalization, an ERCP was performed during which a stone was removed and a stent placed into your common bile duct to help keep it open.  You reported that your pain decreased after the procedures.  Unfortunately, you are not a candidate for surgery to remove the gallbladder, so that there is always a change that you experience the pain of gallstones again.  Please return to the Emergency Room if your abdominal pain is bad enough to stop you from eating, or if you feel fever, chills, severe lightheadedness, nausea and vomiting, or diarrhea.      SECONDARY DISCHARGE DIAGNOSES  Diagnosis: Internal hemorrhoid  Assessment and Plan of Treatment: Your colonscopy showed you have internal hemorrhoids which were likely responsible for your painless, mild gastrointestinal bleeding.  Please continue taking lactulose and eat a varied diet filled with fiber in order to keep your bowel movements regular.    Diagnosis: Peptic ulcer  Assessment and Plan of Treatment: You had peptic ulcers demonstrated on previous EGD and which were seen again on the ERCP performed during this hospitalization.   You were supposed to start antimicrobials for the organism, H. pylori, that is the most common cause of peptic ulcers but you had not before coming to the hospital.  In the hospital you were started on triple therapy for H. pylori.  Please continue to take Amoxicillin, Pantoprazole, and Clarithromycin as you leave the hospital until 11/14.    Diagnosis: Choledocholithiasis  Assessment and Plan of Treatment: You have a history of gallstones that was seen again on your recent abdominal CT scan.  You underwent a procedure by ERCP to remove the stone and place a stent into the common bile duct to keep it open. PRINCIPAL DISCHARGE DIAGNOSIS  Diagnosis: Abdominal pain  Assessment and Plan of Treatment: You came into the hospital with abdominal pain.  You had multiple reasons to have abdominal pain.  An EGD previously performed showed gastritis, peptic ulcer disease, and H. pylori infection by biopsy.  You were suppose to start antimicrobial therapy for H. pylori but did not.  Further, you have a hx of gallstones, which were seen again on a CAT scan of your abdomen.  The repeat scan showed a stone in the common bile duct which did block it enough to make the duct appear bigger.  During your hospitalization, an ERCP was performed during which a stone was removed and a stent placed into your common bile duct to help keep it open.  You reported that your pain decreased after the procedures.  Unfortunately, you are not a candidate for surgery to remove the gallbladder, so that there is always a change that you experience the pain of gallstones again.  Please return to the Emergency Room if your abdominal pain is bad enough to stop you from eating, or if you feel fever, chills, severe lightheadedness, nausea and vomiting, or diarrhea.  Please follow up with your hepatologist and GI doctor within 1-2 weeks.      SECONDARY DISCHARGE DIAGNOSES  Diagnosis: Internal hemorrhoid  Assessment and Plan of Treatment: Your colonscopy showed you have internal hemorrhoids which were likely responsible for your painless, mild gastrointestinal bleeding.  Please continue taking lactulose and eat a varied diet filled with fiber in order to keep your bowel movements regular.    Diagnosis: Peptic ulcer  Assessment and Plan of Treatment: You had peptic ulcers demonstrated on previous EGD and which were seen again on the ERCP performed during this hospitalization.   You were supposed to start antimicrobials for the organism, H. pylori, that is the most common cause of peptic ulcers but you had not before coming to the hospital.  In the hospital you were started on triple therapy for H. pylori.  Please continue to take Amoxicillin, Pantoprazole, and Clarithromycin as you leave the hospital until 11/14.  Please follow up with GI doctor within 1-2 weeks regarding clearance of H. pylori.    Diagnosis: Choledocholithiasis  Assessment and Plan of Treatment: You have a history of gallstones that was seen again on your recent abdominal CT scan.  You underwent a procedure by ERCP to remove the stone and place a stent into the common bile duct to keep it open.

## 2019-11-07 NOTE — PROGRESS NOTE ADULT - SUBJECTIVE AND OBJECTIVE BOX
Matt Rivas MD, PhD  PGY1  [phone] / [phone]    Interval Hx / Subjective: still endorses abd pain but says pain has improved; had several loose non-bloody BM o/n; denied N/V, tolerating thin liquid diet;     ROS: as above     MEDICATIONS  (STANDING):  amoxicillin 1000 milliGRAM(s) Oral two times a day  clarithromycin 500 milliGRAM(s) Oral two times a day  influenza   Vaccine 0.5 milliLiter(s) IntraMuscular once  lactulose Syrup 15 Gram(s) Oral two times a day  pantoprazole    Tablet 40 milliGRAM(s) Oral two times a day  propranolol 10 milliGRAM(s) Oral two times a day    MEDICATIONS  (PRN):  acetaminophen   Tablet .. 650 milliGRAM(s) Oral every 6 hours PRN Mild Pain (1 - 3), Moderate Pain (4 - 6)    Objective:  Vital Signs Last 24 Hrs  T(C): 36.7 (06 Nov 2019 04:37), Max: 36.7 (06 Nov 2019 04:37)  T(F): 98.1 (06 Nov 2019 04:37), Max: 98.1 (06 Nov 2019 04:37)  HR: 86 (06 Nov 2019 04:37) (58 - 86)  BP: 107/63 (06 Nov 2019 04:37) (100/67 - 147/73)  BP(mean): --  RR: 16 (06 Nov 2019 04:37) (13 - 25)  SpO2: 98% (06 Nov 2019 04:37) (95% - 100%)    Physical Exam:  GEN: sitting in bed, eating breakfast cereal, NAD  CV: RRR, S1, slurred S2, heart sounds better appreciated when pt fully seated up; no M/R/G; good capillary refill  PULM: LCTAB; not using accessory muscles  ABD: diffuse upper abdominal tenderness to light palpation, mild guarding RUQ; normal BS; not distended; no rebound   BACK: no CVA tenderness  Extremities: no clubbing, cyanosis; no edema; DP and radial pulses palpable  NEURO: awake and ; no FND               11.2   7.94  )-----------( 163      ( 11-06 @ 06:20 )             34.7                11.3   6.20  )-----------( 195      ( 11-05 @ 05:20 )             35.7                11.5   7.13  )-----------( 180      ( 11-04 @ 05:46 )             36.5     11-06    135  |  104  |  21  ----------------------------<  150<H>  4.1   |  23  |  1.32<H>    Ca    8.7      06 Nov 2019 06:20  Phos  2.5     11-06  Mg     1.6     11-06    TPro  6.3  /  Alb  3.1<L>  /  TBili  1.8<H>  /  DBili  x   /  AST  188<H>  /  ALT  107<H>  /  AlkPhos  224<H>  11-06    Lipase, Serum (11.06.19 @ 06:20)    Lipase, Serum: 100.9 U/L    PT/INR - ( 05 Nov 2019 05:20 )   PT: 17.7 SEC;   INR: 1.57     PTT - ( 05 Nov 2019 05:20 )  PTT:32.4 SEC forgetful

## 2019-11-07 NOTE — PROGRESS NOTE ADULT - SUBJECTIVE AND OBJECTIVE BOX
Chief Complaint:  Patient is a 79y old  Male who presents with a chief complaint of Abdominal Pain (06 Nov 2019 11:05)      Interval Events: No adverse events overnight    Allergies:  No Known Allergies      Hospital Medications:  acetaminophen   Tablet .. 650 milliGRAM(s) Oral every 6 hours PRN  amoxicillin 1000 milliGRAM(s) Oral two times a day  clarithromycin 500 milliGRAM(s) Oral two times a day  influenza   Vaccine 0.5 milliLiter(s) IntraMuscular once  lactated ringers. 1000 milliLiter(s) IV Continuous <Continuous>  lactulose Syrup 15 Gram(s) Oral two times a day  pantoprazole    Tablet 40 milliGRAM(s) Oral two times a day  propranolol 10 milliGRAM(s) Oral two times a day      PMHX/PSHX:  Liver cirrhosis  Guillain-Philadelphia syndrome  HTN (hypertension)  H/O inguinal hernia repair      Family history:  Family history of ovarian cancer (Sibling)      ROS:     General:  No wt loss, fevers, chills, night sweats, fatigue,   Eyes:  Good vision, no reported pain  ENT:  No sore throat, pain, runny nose, dysphagia  CV:  No pain, palpitations, hypo/hypertension  Resp:  No dyspnea, cough, tachypnea, wheezing  GI:  See HPI  :  No pain, bleeding, incontinence, nocturia  Muscle:  No pain, weakness  Neuro:  No weakness, tingling, memory problems  Psych:  No fatigue, insomnia, mood problems, depression  Endocrine:  No polyuria, polydipsia, cold/heat intolerance  Heme:  No petechiae, ecchymosis, easy bruisability  Skin:  No rash, edema      PHYSICAL EXAM:     GENERAL: NAD  HEENT:  NC/AT  CHEST:  Full & symmetric excursion, no increased effort  ABDOMEN:  Soft, mildly TTP,  non-distended, +BS  EXTREMITIES:  no edema  SKIN:  No rash  NEURO:  Alert      Vital Signs:  Vital Signs Last 24 Hrs  T(C): 36.8 (07 Nov 2019 05:18), Max: 37.6 (06 Nov 2019 13:10)  T(F): 98.2 (07 Nov 2019 05:18), Max: 99.7 (06 Nov 2019 13:10)  HR: 60 (07 Nov 2019 05:18) (56 - 75)  BP: 97/50 (07 Nov 2019 05:18) (97/50 - 131/67)  BP(mean): --  RR: 16 (07 Nov 2019 05:18) (16 - 18)  SpO2: 99% (07 Nov 2019 05:18) (98% - 100%)  Daily     Daily     LABS:                        11.2   7.94  )-----------( 163      ( 06 Nov 2019 06:20 )             34.7     11-06    135  |  104  |  21  ----------------------------<  150<H>  4.1   |  23  |  1.32<H>    Ca    8.7      06 Nov 2019 06:20  Phos  2.5     11-06  Mg     1.6     11-06    TPro  6.3  /  Alb  3.1<L>  /  TBili  1.8<H>  /  DBili  x   /  AST  188<H>  /  ALT  107<H>  /  AlkPhos  224<H>  11-06    LIVER FUNCTIONS - ( 06 Nov 2019 06:20 )  Alb: 3.1 g/dL / Pro: 6.3 g/dL / ALK PHOS: 224 u/L / ALT: 107 u/L / AST: 188 u/L / GGT: x                   Imaging:  reviewed

## 2019-11-07 NOTE — DIETITIAN INITIAL EVALUATION ADULT. - PROBLEM SELECTOR PLAN 4
Pt with elevated lactate on presentation.  Concern for infection vs mesenteric ischemia.    C/w IVF as above   Trend lactate q6h

## 2019-11-07 NOTE — DISCHARGE NOTE PROVIDER - NSDCCPTREATMENT_GEN_ALL_CORE_FT
PRINCIPAL PROCEDURE  Procedure: Colonoscopy  Findings and Treatment: The terminal ileum appeared normal.       A single medium-sized localized angioectasia without bleeding was found        in the proximal ascending colon. Coagulation for bleeding prevention        using argon plasma at 2 liters/minute and 20 woodruff was successful.        Estimated blood loss was minimal.       A single small patchy angiodysplastic lesion without bleeding was found        in the sigmoid colon. Coagulation for bleeding prevention using argon        plasma at 2 liters/minute and 20 woodruff was successful. Estimated blood        loss: none.       A less than 5 mm polyp was found in the sigmoid colon. The polyp was        sessile. The polyp was removed with a cold biopsy forceps. Resection and        retrieval were complete. Estimated blood loss was minimal.       A single medium-sized patchy angiodysplastic lesion without bleeding was        found in the distal rectum on the rectal varix. Coagulation for bleeding        prevention using argon plasma at 2 liters/minute and 20 woodruff was        successful. Estimated blood loss: none.       A 7 mm rectal varix was found.       Internal hemorrhoids were found during retroflexion. The hemorrhoids        were moderate and medium-sized.       The perianal and digital rectal examinations were normal.                                                                                   Impression:          - The examined portion of the ileum was normal.                       - A single non-bleeding colonic angioectasia. Treated                        with argon plasma coagulation (APC).                       - A single non-bleeding colonic angiodysplastic lesion.             Treated with argon plasma coagulation (APC).                       - One less than 5 mm polyp in the sigmoid colon, removed                        with a jumbo cold forceps. Resected and retrieved.                       - A single non-bleeding colonic angiodysplastic lesion.                        Treated with argon plasma coagulation (APC).                       - Rectal varice      SECONDARY PROCEDURE  Procedure: ERCP  Findings and Treatment: Impression:          EGD:                       -The esophagus was normal.                       -The stomach was normal.   -The duodenal bulb and sweep contained multiple deep                        clean based ulcers.                       ERCP:                       -The  film was normal.                       -The duodenoscope was passed to the ampulla.               -The ampulla was normal appearing.                       -The PD was cannulated while trying to cannulate the                        bile duct. The PD was not injected.                       -Double wire technique failed to cannulate thebile duct                        and thus a 5Fr-4 cm single pigtail stent was placed in                        the PD.                       -Biliary cannulation was successful after PD stent                        placement using a Rx 39 preloaded sphincterotome.                       -Contrast was injected into the bile duct. I acquired                        and interpreted the fluoroscopic images. Images                        downloaded to PACS. There was a dilated CBD with a round                filling object.                       -A biliary sphincterotomy was performed.                       -A balloon catheter was used to remove the stone.                       -An occlusion cholangiogram showed no more stones           remained.                       -There was excellent flow of bile and contrast from the                        bile duct.

## 2019-11-07 NOTE — DISCHARGE NOTE PROVIDER - NSDCFUADDAPPT_GEN_ALL_CORE_FT
Please follow up with Dr. Desirae Pizarro, your primary care physician, when you go to subacute rehabilitation.    Please also follow up with Dr. Samson of [facility] for continued care of your liver Please follow up with Dr. Desirae Pizarro, your primary care physician, when you go to subacute rehabilitation.    Please also follow up with Dr. Samson of [facility] for continued care of your liver.

## 2019-11-07 NOTE — PROGRESS NOTE ADULT - PROBLEM SELECTOR PLAN 1
- Colicky upper abd pain +/- associated w/ food  - ddx includes PUD vs choledocholithiasis, pancreatitis, duodenitis, mesenteric ischemia  - CT A/P shows cholelithiasis, choledocholithiasis w/ CBD dilitation; neg pancreatitis    - given hematochezia this admission, c/f bleeding PUD  - s/p colonoscopy (10/31)  - low likelihood of ischemic abdominal pathology at this time  - s/p ERCP, stent placement and stone removal  - mild transaminitis w/ elevated alk phos and lipase 2/2 to ERCP  - c/w triple therapy for H. pylori, day 5 of 14 - Colicky upper abd pain +/- associated w/ food, now improving.   - Pain likely from PUD (H.pylori infection) vs. chronic choledocholithiasis.   - Had mild elevation in lipase, which is expected post ERCP.    - low likelihood of ischemic abdominal pathology at this time  - s/p ERCP, stent placement and stone removal  - c/w triple therapy for H. pylori, day 5 of 14

## 2019-11-07 NOTE — DIETITIAN INITIAL EVALUATION ADULT. - OTHER INFO
Pt w/ Hx of GBS (1996), decompensated alcoholic cirrhosis (encephalopathy on lactulose), porcelain gallbladder, SMA stenosis, p/w 2 d hx of diffuse abdominal.  Admitted with SIRS possibly 2/2 PUD (H. pylori+ on previous EGD never started on triple therapy), vs choledocholithiasis vs. pancreatitis and acute blood loss anemia likely from PUD vs varices. Course c/b asymptomatic E. coli bacteruria.  Currently on triple therapy for H. pylori found on previous EGD bx, day 3 of 14.  s/p ERCP w/ stent placement and stone removal, now w/ mildly elevated LFTs.    Pt with LOS Day 10. Diet now advanced to Regular texture after NPO X 2 days and on Full liquids. Pt now with good appetite and completed >75% of breakfast tray. Denies chewing, swallowing difficulties or any nausea, vomiting, diarrhea. Reports + small BM's. Prior to admission pt with poor po intakes > 1 week.

## 2019-11-07 NOTE — DISCHARGE NOTE PROVIDER - PROVIDER TOKENS
PROVIDER:[TOKEN:[[id number]]] PROVIDER:[TOKEN:[[id number]]],PROVIDER:[TOKEN:[[id number]]]

## 2019-11-07 NOTE — PROGRESS NOTE ADULT - PROBLEM SELECTOR PLAN 3
patient had sepsis on admission with Leukocytosis and tachycardia to 130 on admission likely from UTI and possible GI translocation in setting of new GIB.  Lactate 4.4 now downtrending s/p IVF.  +UA. Lungs clear on CXR, BCx x2 negative, Ucx growing E.Coli (sensitive to Amoxicillin) - Patient had sepsis on admission with Leukocytosis and tachycardia to 130 on admission likely from UTI and possible GI translocation in setting of new GIB. Sepsis now resolved  - Lactate 4.4 now downtrending s/p IVF.  +UA. Lungs clear on CXR, BCx x2 negative, Ucx growing E.Coli (sensitive to Amoxicillin)

## 2019-11-07 NOTE — DISCHARGE NOTE PROVIDER - CARE PROVIDERS DIRECT ADDRESSES
[email or web address]

## 2019-11-08 ENCOUNTER — TRANSCRIPTION ENCOUNTER (OUTPATIENT)
Age: 79
End: 2019-11-08

## 2019-11-08 VITALS
OXYGEN SATURATION: 100 % | RESPIRATION RATE: 16 BRPM | SYSTOLIC BLOOD PRESSURE: 115 MMHG | DIASTOLIC BLOOD PRESSURE: 62 MMHG | HEART RATE: 60 BPM | TEMPERATURE: 98 F

## 2019-11-08 LAB
ALBUMIN SERPL ELPH-MCNC: 3 G/DL — LOW (ref 3.3–5)
ALP SERPL-CCNC: 249 U/L — HIGH (ref 40–120)
ALT FLD-CCNC: 76 U/L — HIGH (ref 4–41)
ANION GAP SERPL CALC-SCNC: 11 MMO/L — SIGNIFICANT CHANGE UP (ref 7–14)
AST SERPL-CCNC: 72 U/L — HIGH (ref 4–40)
BASOPHILS # BLD AUTO: 0.03 K/UL — SIGNIFICANT CHANGE UP (ref 0–0.2)
BASOPHILS NFR BLD AUTO: 0.5 % — SIGNIFICANT CHANGE UP (ref 0–2)
BILIRUB SERPL-MCNC: 1.5 MG/DL — HIGH (ref 0.2–1.2)
BUN SERPL-MCNC: 15 MG/DL — SIGNIFICANT CHANGE UP (ref 7–23)
CALCIUM SERPL-MCNC: 8.7 MG/DL — SIGNIFICANT CHANGE UP (ref 8.4–10.5)
CHLORIDE SERPL-SCNC: 100 MMOL/L — SIGNIFICANT CHANGE UP (ref 98–107)
CO2 SERPL-SCNC: 24 MMOL/L — SIGNIFICANT CHANGE UP (ref 22–31)
CREAT SERPL-MCNC: 1.42 MG/DL — HIGH (ref 0.5–1.3)
EOSINOPHIL # BLD AUTO: 0.23 K/UL — SIGNIFICANT CHANGE UP (ref 0–0.5)
EOSINOPHIL NFR BLD AUTO: 3.9 % — SIGNIFICANT CHANGE UP (ref 0–6)
GLUCOSE SERPL-MCNC: 90 MG/DL — SIGNIFICANT CHANGE UP (ref 70–99)
HCT VFR BLD CALC: 34 % — LOW (ref 39–50)
HGB BLD-MCNC: 10.9 G/DL — LOW (ref 13–17)
IMM GRANULOCYTES NFR BLD AUTO: 0.9 % — SIGNIFICANT CHANGE UP (ref 0–1.5)
INR BLD: 1.86 — HIGH (ref 0.88–1.17)
LYMPHOCYTES # BLD AUTO: 1.1 K/UL — SIGNIFICANT CHANGE UP (ref 1–3.3)
LYMPHOCYTES # BLD AUTO: 18.8 % — SIGNIFICANT CHANGE UP (ref 13–44)
MAGNESIUM SERPL-MCNC: 2 MG/DL — SIGNIFICANT CHANGE UP (ref 1.6–2.6)
MCHC RBC-ENTMCNC: 26 PG — LOW (ref 27–34)
MCHC RBC-ENTMCNC: 32.1 % — SIGNIFICANT CHANGE UP (ref 32–36)
MCV RBC AUTO: 81.1 FL — SIGNIFICANT CHANGE UP (ref 80–100)
MONOCYTES # BLD AUTO: 0.41 K/UL — SIGNIFICANT CHANGE UP (ref 0–0.9)
MONOCYTES NFR BLD AUTO: 7 % — SIGNIFICANT CHANGE UP (ref 2–14)
NEUTROPHILS # BLD AUTO: 4.03 K/UL — SIGNIFICANT CHANGE UP (ref 1.8–7.4)
NEUTROPHILS NFR BLD AUTO: 68.9 % — SIGNIFICANT CHANGE UP (ref 43–77)
NRBC # FLD: 0 K/UL — SIGNIFICANT CHANGE UP (ref 0–0)
PHOSPHATE SERPL-MCNC: 2.4 MG/DL — LOW (ref 2.5–4.5)
PLATELET # BLD AUTO: 156 K/UL — SIGNIFICANT CHANGE UP (ref 150–400)
PMV BLD: 10.4 FL — SIGNIFICANT CHANGE UP (ref 7–13)
POTASSIUM SERPL-MCNC: 4 MMOL/L — SIGNIFICANT CHANGE UP (ref 3.5–5.3)
POTASSIUM SERPL-SCNC: 4 MMOL/L — SIGNIFICANT CHANGE UP (ref 3.5–5.3)
PROT SERPL-MCNC: 6.5 G/DL — SIGNIFICANT CHANGE UP (ref 6–8.3)
PROTHROM AB SERPL-ACNC: 21.6 SEC — HIGH (ref 9.8–13.1)
RBC # BLD: 4.19 M/UL — LOW (ref 4.2–5.8)
RBC # FLD: 17 % — HIGH (ref 10.3–14.5)
SODIUM SERPL-SCNC: 135 MMOL/L — SIGNIFICANT CHANGE UP (ref 135–145)
WBC # BLD: 5.85 K/UL — SIGNIFICANT CHANGE UP (ref 3.8–10.5)
WBC # FLD AUTO: 5.85 K/UL — SIGNIFICANT CHANGE UP (ref 3.8–10.5)

## 2019-11-08 PROCEDURE — 99232 SBSQ HOSP IP/OBS MODERATE 35: CPT | Mod: GC

## 2019-11-08 PROCEDURE — 99239 HOSP IP/OBS DSCHRG MGMT >30: CPT

## 2019-11-08 RX ADMIN — FAMOTIDINE 20 MILLIGRAM(S): 10 INJECTION INTRAVENOUS at 13:00

## 2019-11-08 RX ADMIN — Medication 10 MILLIGRAM(S): at 05:59

## 2019-11-08 RX ADMIN — LACTULOSE 15 GRAM(S): 10 SOLUTION ORAL at 05:59

## 2019-11-08 RX ADMIN — Medication 1000 MILLIGRAM(S): at 05:59

## 2019-11-08 RX ADMIN — PANTOPRAZOLE SODIUM 40 MILLIGRAM(S): 20 TABLET, DELAYED RELEASE ORAL at 05:59

## 2019-11-08 RX ADMIN — Medication 500 MILLIGRAM(S): at 05:59

## 2019-11-08 NOTE — PROGRESS NOTE ADULT - PROVIDER SPECIALTY LIST ADULT
Anesthesia
Gastroenterology
Hepatology
Internal Medicine

## 2019-11-08 NOTE — DISCHARGE NOTE NURSING/CASE MANAGEMENT/SOCIAL WORK - NSDCFUADDAPPT_GEN_ALL_CORE_FT
Please follow up with Dr. Desirae Pizarro, your primary care physician, when you go to subacute rehabilitation.    Please also follow up with Dr. Samson of [facility] for continued care of your liver.

## 2019-11-08 NOTE — PROGRESS NOTE ADULT - SUBJECTIVE AND OBJECTIVE BOX
Matt Rivas MD, PhD  PGY1  [phone] / [phone]    Interval Hx / Subjective:     ROS: as above     MEDICATIONS  (STANDING):  amoxicillin 1000 milliGRAM(s) Oral two times a day  clarithromycin 500 milliGRAM(s) Oral two times a day  influenza   Vaccine 0.5 milliLiter(s) IntraMuscular once  lactulose Syrup 15 Gram(s) Oral two times a day  pantoprazole    Tablet 40 milliGRAM(s) Oral two times a day  propranolol 10 milliGRAM(s) Oral two times a day    MEDICATIONS  (PRN):  acetaminophen   Tablet .. 650 milliGRAM(s) Oral every 6 hours PRN Mild Pain (1 - 3), Moderate Pain (4 - 6)    Objective:  Vital Signs Last 24 Hrs  T(C): 36.7 (06 Nov 2019 04:37), Max: 36.7 (06 Nov 2019 04:37)  T(F): 98.1 (06 Nov 2019 04:37), Max: 98.1 (06 Nov 2019 04:37)  HR: 86 (06 Nov 2019 04:37) (58 - 86)  BP: 107/63 (06 Nov 2019 04:37) (100/67 - 147/73)  BP(mean): --  RR: 16 (06 Nov 2019 04:37) (13 - 25)  SpO2: 98% (06 Nov 2019 04:37) (95% - 100%)    Physical Exam:  GEN: sitting in bed, eating breakfast cereal, NAD  CV: RRR, S1, slurred S2, heart sounds better appreciated when pt fully seated up; no M/R/G; good capillary refill  PULM: LCTAB; not using accessory muscles  ABD: diffuse upper abdominal tenderness to light palpation, mild guarding RUQ; normal BS; not distended; no rebound   BACK: no CVA tenderness  Extremities: no clubbing, cyanosis; no edema; DP and radial pulses palpable  NEURO: awake and ; no FND               11.2   7.94  )-----------( 163      ( 11-06 @ 06:20 )             34.7                11.3   6.20  )-----------( 195      ( 11-05 @ 05:20 )             35.7                11.5   7.13  )-----------( 180      ( 11-04 @ 05:46 )             36.5     11-06    135  |  104  |  21  ----------------------------<  150<H>  4.1   |  23  |  1.32<H>    Ca    8.7      06 Nov 2019 06:20  Phos  2.5     11-06  Mg     1.6     11-06    TPro  6.3  /  Alb  3.1<L>  /  TBili  1.8<H>  /  DBili  x   /  AST  188<H>  /  ALT  107<H>  /  AlkPhos  224<H>  11-06    Lipase, Serum (11.06.19 @ 06:20)    Lipase, Serum: 100.9 U/L    PT/INR - ( 05 Nov 2019 05:20 )   PT: 17.7 SEC;   INR: 1.57     PTT - ( 05 Nov 2019 05:20 )  PTT:32.4 SEC Matt Rivas MD, PhD  PGY1  [phone] / [phone]    Interval Hx / Subjective: vomiting after KCl pill yesterday, N/V improved after Zofran, no additional episode; abdominal pain same as yesterday, improved since admission; had a couple of bowel movements, loose stool, non-dark, non-bloody; denied F/C, CP, SOB, wheezing, lightheadedness, HA, N/V (this AM), flank pain, dysuria    ROS: as above     MEDICATIONS  (STANDING):  amoxicillin 1000 milliGRAM(s) Oral two times a day  clarithromycin 500 milliGRAM(s) Oral two times a day  influenza   Vaccine 0.5 milliLiter(s) IntraMuscular once  lactulose Syrup 15 Gram(s) Oral two times a day  pantoprazole    Tablet 40 milliGRAM(s) Oral two times a day  propranolol 10 milliGRAM(s) Oral two times a day    MEDICATIONS  (PRN):  acetaminophen   Tablet .. 650 milliGRAM(s) Oral every 6 hours PRN Mild Pain (1 - 3), Moderate Pain (4 - 6)    Objective:  Vital Signs Last 24 Hrs  T(C): 36.8 (08 Nov 2019 05:13), Max: 36.8 (07 Nov 2019 21:56)  T(F): 98.3 (08 Nov 2019 05:13), Max: 98.3 (08 Nov 2019 05:13)  HR: 60 (08 Nov 2019 05:13) (60 - 82)  BP: 115/62 (08 Nov 2019 05:13) (115/62 - 135/67)  BP(mean): --  RR: 16 (08 Nov 2019 05:13) (16 - 18)  SpO2: 100% (08 Nov 2019 05:13) (98% - 100%)    Physical Exam:  GEN: sitting in bed, NAD  CV: RRR, S1, S2; heart sounds better appreciated when pt fully seated up; no M/R/G  PULM: LCTAB; not using accessory muscles  ABD: diffuse upper abdominal tenderness to light palpation, mild epigastric guarding to deep palpation; normal BS; not distended; no rebound   BACK: no CVA tenderness  Extremities: no clubbing, cyanosis; no edema; DP and radial pulses palpable  NEURO: awake and alert, oriented to place but not place and time; no FND                        10.9   5.85  )-----------( 156      ( 08 Nov 2019 06:58 )             34.0     11-08    135  |  100  |  15  ----------------------------<  90  4.0   |  24  |  1.42<H>    Ca    8.7      08 Nov 2019 06:58  Phos  2.4     11-08  Mg     2.0     11-08    TPro  6.5  /  Alb  3.0<L>  /  TBili  1.5<H>  /  DBili  x   /  AST  72<H>  /  ALT  76<H>  /  AlkPhos  249<H>  11-08

## 2019-11-08 NOTE — PROGRESS NOTE ADULT - PROBLEM SELECTOR PLAN 1
- Colicky upper abd pain +/- associated w/ food, now improving.   - Pain likely from PUD (H.pylori infection) vs. chronic choledocholithiasis.   - Had mild elevation in lipase, which is expected post ERCP.    - low likelihood of ischemic abdominal pathology at this time  - s/p ERCP, stent placement and stone removal  - c/w triple therapy for H. pylori, day 5 of 14 - Colicky upper abd pain +/- associated w/ food, now improving.   - Pain likely from PUD (H.pylori infection) vs. chronic choledocholithiasis.   - Had mild elevation in lipase, which is expected post ERCP    - low likelihood of ischemic abdominal pathology at this time  - s/p ERCP, stent placement and stone removal  - c/w triple therapy for H. pylori, start date 11/1, 14 d course

## 2019-11-08 NOTE — PROGRESS NOTE ADULT - REASON FOR ADMISSION
Abdominal Pain

## 2019-11-08 NOTE — PROGRESS NOTE ADULT - PROBLEM SELECTOR PLAN 8
Decompensated ETOH Cirrhosis, previously on transplant list but no longer  MELD-Na 20. 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 No ascities on CT. 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  Liver lesion on MR concerning for HCC, repeat MR inconclusive, being actively followed as outpatient.   - Mental status at baseline per wife, currently AOx3 (but states he has been getting forgetful with age)   - Trend MELD labs  - Propranolol 20 mg bid  - ammonia wnl Decompensated ETOH Cirrhosis, previously on transplant list but no longer  MELD-Na 20. 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 No ascities on CT. 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  Liver lesion on MR concerning for HCC, repeat MR inconclusive, being actively followed as outpatient.   - Mental status at baseline per wife, currently AOx3 (but states he has been getting forgetful with age)   - Trend MELD labs  - Propranolol 20 mg bid  - ammonia wnl  - elevated LFTs and +lipase 2/2 post-ERCP trauma  - elevated INR now downtrending 2/2 to abx and prolonged NPO status

## 2019-11-08 NOTE — PROGRESS NOTE ADULT - ATTENDING COMMENTS
Pt seen and examined, chart and labs reviewed. Pt independently cleaning himself, reports mild abdominal pain, but nothing apart from his ordinary.    Team reached out to hepatology team due to concern of rising transaminases and INR elevation, and new MELD-Na calculation of 23 (from 19), however they believe the coag results is likely in setting of NPO status and current antibiotic use.      Pt is medically stable for d/c to [facility] today.    D/C time: 32 minutes
Agree with Dr. Rivas's note as above.   Medically stable for discharge to [facility].   D/C Time: 32 minutes
Pt. seen and examined, chart and labs reviewed.  Agree with Dr. Rivas's note as above with modifications.  Pt scheduled for ERCP and stone extraction today, which is likely the cause of his chronic abdominal pain.  Pt is also on triple therapy for biopsy proven H.pylori infection, which may also be contributing to his nonspecific abdominal pain.    Pt is medically stable for discharge to [facility] once bed available, SW aware.
Patient was seen and examined personally by me. I have discussed the plan and reviewed the resident's note and agree with the above physical exam findings including assessment and plan except as indicated below.    79 yr old man h/o alcoholic cirrhosis, variceal bleeding on propanolol, porcelain gallbladder, SMA stenosis, presenting with diffuse abdominal pain x 2 days. Admitted with sepsis 2/2 UTI and possible GI translocation of gut bacteria in setting of acute blood loss anemia found to have multiple nonbleeding colonic angioectasia s/p APC, 5mm sigmoid polyp which was removed, also large hemorrhoids, likely source of bleeding per GI.     Elective ERCP for choledocholithiasis for CBD stone removal tentatively for Mon/Tues. NPO pMN Sun  10/28 Bcx NGTD, Ucx: 10-49k Ecoli that's pansensitive, s/p zosyn, now on amoxicillin as part of H.pylori treatment-seen on path from 9/11  C/w protonix PO BID, propanolol PO BID.   PT recs: rehab which patient is considering. DC pending rehab likely Tues
Patient was seen and examined personally by me. I have discussed the plan and reviewed the resident's note and agree with the above physical exam findings including assessment and plan except as indicated below.    79 yr old man h/o alcoholic cirrhosis, variceal bleeding on propanolol, porcelain gallbladder, SMA stenosis, presenting with diffuse abdominal pain x 2 days. Admitted with sepsis 2/2 UTI and possible GI translocation of gut bacteria in setting of acute blood loss anemia    S/p colonoscopy on 10/31 revealing multiple nonbleeding colonic angioectasia s/p APC, 5mm sigmoid polyp which was removed, also large hemorrhoids, likely source of bleeding per GI. No further bleeding reported overnight  10/28 Bcx NGTD, Ucx: 10-49k Ecoli that's pansensitive, DC Zosyn and started amoxicillin as part of H.pylori treatment-seen on path from 9/11  C/w protonix PO BID, propanolol PO BID. DC octreotide  Elective ERCP for choledocholithiasis for CBD stone removal tentatively for Mon/Tues  As per previous notes by Dr. Mcgraw, surgery for porcelain GB was consider too risky and was deferred.  PT recs: rehab which patient is considering. DC pending rehab.
Pt seen and examined, chart and labs reviewed.  Pt without acute complaints, though noted to have acute rise in transaminases with mild epigastric abdominal pain.  This can be expected post-ERCP, however will continue to monitor as inpatient for possible pancreatitis (lipase mildly elevated).    Agree with Dr. Rivas's note as above with modifications.
Patient was seen and examined personally by me. I have discussed the plan and reviewed the resident's note and agree with the above physical exam findings including assessment and plan except as indicated below.    79 yr old man h/o alcoholic cirrhosis, variceal bleeding on propanolol, porcelain gallbladder, SMA stenosis, presenting with diffuse abdominal pain x 2 days. Admitted with sepsis 2/2 UTI and possible GI translocation of gut bacteria in setting of acute blood loss anemia    S/p colonoscopy on 10/31 revealing multiple nonbleeding colonic angioectasia s/p APC, 5mm sigmoid polyp which was removed, also large hemorrhoids, likely source of bleeding per GI. No further bleeding   10/28 Bcx NGTD, Ucx: 10-49k Ecoli that's pansensitive, s/p zosyn, now on amoxicillin as part of H.pylori treatment-seen on path from 9/11  C/w protonix PO BID, propanolol PO BID.   Elective ERCP for choledocholithiasis for CBD stone removal tentatively for Mon/Tues. NPO pMN Sun  As per previous notes by Dr. Mcgraw, surgery for porcelain GB was consider too risky and was deferred.  PT recs: rehab which patient is considering. DC pending rehab likely Tues
Patient was seen and examined personally by me. I have discussed the plan and reviewed the resident's note and agree with the above physical exam findings including assessment and plan except as indicated below.    79 yr old man h/o alcoholic cirrhosis, variceal bleeding, porcelain gallbladder, SMA stenosis, presenting with diffuse abdominal pain x 2 days. Admitted with sepsis 2/2 UTI and acute blood loss anemia    F/u Bcx, Ucx, c/w Zosyn  Abdominal pain could be PUD vs gastric ulcer-has known duodenitis from previous EGD and pathology with Hpylori  NPO for now, CBC q6, c/w protonix IV BID. GI made aware, possible EGD tmw +/- colonoscopy tmw?  Will need treatment for Hpylori  As per previous notes by Dr. Mcgraw, surgery for porcelain GB was consider too risky and was deferred
Patient was seen and examined personally by me. I have discussed the plan and reviewed the resident's note and agree with the above physical exam findings including assessment and plan except as indicated below.    79 yr old man h/o alcoholic cirrhosis, variceal bleeding on propanolol, porcelain gallbladder, SMA stenosis, presenting with diffuse abdominal pain x 2 days. Admitted with sepsis 2/2 UTI and possible GI translocation of gut bacteria in setting of acute blood loss anemia    Had another episode of melena this AM  10/28 Bcx NGTD, Ucx: 10-49k Ecoli, c/w Zosyn. Leukocytosis downtrending  Abdominal pain could be PUD vs gastric ulcer-has known duodenitis from previous EGD and pathology with Hpylori that hasn't been treated  CLQ diet, NPO past MN for colonoscopy +/- EGD?,  c/w protonix IV BID and octreotide, already on propanolol PO BID. GI following  CBC q8 and check orthostatics. Hgb downtrending now 3 U lower than admission Hgb. Transfuse 1 U PRBC if becomes symptomatic or Hgb<8  Will need treatment for Hpylori  Nonurgent ERCP planned eventually  As per previous notes by Dr. Mcgraw, surgery for porcelain GB was consider too risky and was deferred .  PT recs: home PT
Patient was seen and examined personally by me. I have discussed the plan and reviewed the resident's note and agree with the above physical exam findings including assessment and plan except as indicated below.    79 yr old man h/o alcoholic cirrhosis, variceal bleeding on propanolol, porcelain gallbladder, SMA stenosis, presenting with diffuse abdominal pain x 2 days. Admitted with sepsis 2/2 UTI and possible GI translocation of gut bacteria in setting of acute blood loss anemia    No further bleeding reported overnight  10/28 Bcx NGTD, Ucx: 10-49k Ecoli that's pansensitive, c/w Zosyn for now.   Abdominal pain improving: PUD vs gastric ulcer-has known duodenitis from previous EGD and pathology with Hpylori that hasn't been treated  NPO for colonoscopy +/-EGD today. C/w protonix IV BID and octreotide, already on propanolol PO BID. GI following  Will need treatment for Hpylori  Elective ERCP for choledocholithiasis but CBD stone is unchanged from prior imaging  As per previous notes by Dr. Mcgraw, surgery for porcelain GB was consider too risky and was deferred .  PT recs: home PT
Pt seen and examined, chart and labs reviewed.  Agree with Dr. Rivas's note as gilberto with modifications.  Pt continues to have RUQ/LUQ pain, however able to tolerate PO intake.  Plan for ERCP in AM for stone extraction.    Pt with no current evidence of active GI bleed.   Anticipate d/c to [facility] on wednesday.

## 2019-11-08 NOTE — PROGRESS NOTE ADULT - PROBLEM SELECTOR PLAN 10
DVT: pharm ppx on hold in setting of melena  Diet: clears at present; regular diet  Dispo: pending subacute rehab DVT: pharm ppx on hold in setting of melena  Diet: clears at present; regular diet  Dispo: to subacute rehab

## 2019-11-08 NOTE — PROGRESS NOTE ADULT - SUBJECTIVE AND OBJECTIVE BOX
Chief Complaint:  Patient is a 79y old  Male who presents with a chief complaint of Abdominal Pain (2019 06:47)      Interval Events: No adverse events overnight.  Liver tests improving.     Allergies:  No Known Allergies      Hospital Medications:  acetaminophen   Tablet .. 650 milliGRAM(s) Oral every 6 hours PRN  amoxicillin 1000 milliGRAM(s) Oral two times a day  clarithromycin 500 milliGRAM(s) Oral two times a day  famotidine    Tablet 20 milliGRAM(s) Oral daily  influenza   Vaccine 0.5 milliLiter(s) IntraMuscular once  lactulose Syrup 15 Gram(s) Oral two times a day  pantoprazole    Tablet 40 milliGRAM(s) Oral two times a day  propranolol 10 milliGRAM(s) Oral two times a day      PMHX/PSHX:  Liver cirrhosis  Guillain-[place] syndrome  HTN (hypertension)  H/O inguinal hernia repair      Family history:  Family history of ovarian cancer (Sibling)      ROS:     General:  No wt loss, fevers, chills, night sweats, fatigue,   Eyes:  Good vision, no reported pain  ENT:  No sore throat, pain, runny nose, dysphagia  CV:  No pain, palpitations, hypo/hypertension  Resp:  No dyspnea, cough, tachypnea, wheezing  GI:  See HPI  :  No pain, bleeding, incontinence, nocturia  Muscle:  No pain, weakness  Neuro:  No weakness, tingling, memory problems  Psych:  No fatigue, insomnia, mood problems, depression  Endocrine:  No polyuria, polydipsia, cold/heat intolerance  Heme:  No petechiae, ecchymosis, easy bruisability  Skin:  No rash, edema      PHYSICAL EXAM:     GENERAL: NAD  HEENT:  NC/AT  CHEST:  Full & symmetric excursion, no increased effort  ABDOMEN:  Soft, mildly TTP in epigastrium  EXTREMITIES:  no edema  SKIN:  No rash  NEURO:  Alert    Vital Signs:  Vital Signs Last 24 Hrs  T(C): 36.8 (2019 05:13), Max: 36.8 (2019 21:56)  T(F): 98.3 (2019 05:13), Max: 98.3 (2019 05:13)  HR: 60 (2019 05:13) (60 - 82)  BP: 115/62 (2019 05:13) (115/62 - 135/67)  BP(mean): --  RR: 16 (2019 05:13) (16 - 18)  SpO2: 100% (2019 05:13) (98% - 100%)  Daily     Daily Weight in k.8 (2019 12:03)    LABS:                        10.9   5.85  )-----------( 156      ( 2019 06:58 )             34.0     11    135  |  100  |  15  ----------------------------<  90  4.0   |  24  |  1.42<H>    Ca    8.7      2019 06:58  Phos  2.4       Mg     2.0         TPro  6.5  /  Alb  3.0<L>  /  TBili  1.5<H>  /  DBili  x   /  AST  72<H>  /  ALT  76<H>  /  AlkPhos  249<H>      LIVER FUNCTIONS - ( 2019 06:58 )  Alb: 3.0 g/dL / Pro: 6.5 g/dL / ALK PHOS: 249 u/L / ALT: 76 u/L / AST: 72 u/L / GGT: x           PT/INR - ( 2019 06:58 )   PT: 21.6 SEC;   INR: 1.86          PTT - ( 2019 06:01 )  PTT:28.3 SEC    Amylase Serum--      Lipase serum78.6       Ammonia--      Imaging:  reviewed

## 2019-11-08 NOTE — PROGRESS NOTE ADULT - PROBLEM SELECTOR PLAN 3
- Patient had sepsis on admission with Leukocytosis and tachycardia to 130 on admission likely from UTI and possible GI translocation in setting of new GIB. Sepsis now resolved  - Lactate 4.4 now downtrending s/p IVF.  +UA. Lungs clear on CXR, BCx x2 negative, Ucx growing E.Coli (sensitive to Amoxicillin) - Patient had sepsis with leukocytosis and tachycardia to 130 on admission likely from UTI and possible GI translocation in setting of new GIB  - Lactate 4.4 now downtrending s/p IVF.  +UA. Lungs clear on CXR, BCx x2 negative, Ucx growing E.Coli (sensitive to Amoxicillin)  - sepsis resolved

## 2019-11-08 NOTE — DISCHARGE NOTE NURSING/CASE MANAGEMENT/SOCIAL WORK - PATIENT PORTAL LINK FT
Protocol For Bath Puva: The patient received Bath PUVA. Protocol For Photochemotherapy: Tar And Nbuvb (Goeckerman Treatment): The patient received Photochemotherapy: Tar and NBUVB (Goeckerman treatment). Protocol For Photochemotherapy For Severe Photoresponsive Dermatoses: Petrolatum And Broad Band Uvb: The patient received Photochemotherapyfor severe photoresponsive dermatoses: Petrolatum and Broad Band UVB requiring at least 4 to 8 hours of care under direct physician supervision. Skin Type: III Detail Level: Generalized Protocol For Uva1: The patient received UVA1. Protocol For Photochemotherapy: Petrolatum And Broad Band Uvb: The patient received Photochemotherapy: Petrolatum and Broad Band UVB. Protocol For Protocol For Photochemotherapy For Severe Photoresponsive Dermatoses: Bath Puva: The patient received Photochemotherapy for severe photoresponsive dermatoses: Bath PUVA requiring at least 4 to 8 hours of care under direct physician supervision. Protocol For Puva: The patient received PUVA. Protocol For Nbuvb: The patient received NBUVB. Protocol: Photochemotherapy: Baby Oil and NBUVB Protocol For Photochemotherapy For Severe Photoresponsive Dermatoses: Puva: The patient received Photochemotherapy for severe photoresponsive dermatoses: PUVA requiring at least 4 to 8 hours of care under direct physician supervision. Protocol For Nb Uva: The patient received NB UVA. Protocol For Photochemotherapy For Severe Photoresponsive Dermatoses: Petrolatum And Nbuvb: The patient received Photochemotherapy for severe photoresponsive dermatoses: Petrolatum and NBUVB requiring at least 4 to 8 hours of care under direct physician supervision. Name Of Supervising Technician: fabiano Consent: Written consent obtained. The risks were reviewed with the patient including but not limited to: burn, pigmentary changes, pain, blistering, scabbing, redness, increased risk of skin cancers, and the remote possibility of scarring. Protocol For Photochemotherapy: Tar And Broad Band Uvb (Goeckerman Treatment): The patient received Photochemotherapy: Tar and Broad Band UVB (Goeckerman treatment). Protocol For Photochemotherapy: Petrolatum And Nbuvb: The patient received Photochemotherapy: Petrolatum and NBUVB (petrolatum applied to all lesions prior to phototherapy). Protocol For Photochemotherapy For Severe Photoresponsive Dermatoses: Tar And Broad Band Uvb (Goeckerman Treatment): The patient received Photochemotherapy for severe photoresponsive dermatoses: Tar and Broad Band UVB (Goeckerman treatment) requiring at least 4 to 8 hours of care under direct physician supervision. You can access the FollowMyHealth Patient Portal offered by [facility] by registering at the following website: [email or web address]. By joining [employer]’s FollowMyHealth portal, you will also be able to view your health information using other applications (apps) compatible with our system. Post-Care Instructions: I reviewed with the patient in detail post-care instructions. Patient is to wear sun protection. Patients may expect sunburn like redness, discomfort and scabbing. Total Body Energy: 365mj Protocol For Uva: The patient received UVA. Protocol For Photochemotherapy: Mineral Oil And Nbuvb: The patient received Photochemotherapy: Mineral Oil and NBUVB (mineral oil applied to all lesions prior to phototherapy). Protocol For Photochemotherapy: Mineral Oil And Broad Band Uvb: The patient received Photochemotherapy: Mineral Oil and Broad Band UVB. Protocol For Photochemotherapy: Triamcinolone Ointment And Nbuvb: The patient received Photochemotherapy: Triamcinolone and NBUVB (triamcinolone ointment applied to all lesions prior to phototherapy). Protocol For Photochemotherapy For Severe Photoresponsive Dermatoses: Tar And Nbuvb (Goeckerman Treatment): The patient received Photochemotherapy for severe photoresponsive dermatoses: Tar and NBUVB (Goeckerman treatment) requiring at least 4 to 8 hours of care under direct physician supervision. Protocol For Photochemotherapy: Baby Oil And Nbuvb: The patient received Photochemotherapy: Baby Oil and NBUVB (baby oil applied to all lesions prior to phototherapy). Total Body Time: 0:49sec Protocol For Broad Band Uvb: The patient received Broad Band UVB. Treatment Number: 6

## 2019-11-08 NOTE — PROGRESS NOTE ADULT - ASSESSMENT
Impression:  79 year old man from home, PMH of [facility] (1996, hospitalized for 4 months), decompensated alcoholic cirrhosis (varices: 5/27/18; no ascites per CT; encephalopathy: on lactulose; ?HCC: concerning lesion seen on MR; off transplant list 2/2 age), porcelain gallbladder, SMA stenosis, s/p b/l inguinal hernia repair, presenting with diffuse abdominal pain x 2 days.      - Choledocholithiasis s/p  ERCP with stone removal  - Cholelithiasis and porcelain gallbladder  - Multiple deep clean based ulcers in duodenal bulb  - Hematochezia, currently HD stable and Hb 14, s/p colonoscopy with hemorrhoids  - Cirrhosis     Recs:  - repeat CBC, CMP, INR  - diet as tolerated  - surgery consult for eventual cholecystectomy if he is a candidate  - supportive care as per primary team    OK for discharge from GI perspective, needs outpatient hepatology followup

## 2019-11-08 NOTE — PROGRESS NOTE ADULT - PROBLEM/PLAN-7
DISPLAY PLAN FREE TEXT
Linear

## 2019-11-21 NOTE — PHYSICAL THERAPY INITIAL EVALUATION ADULT - PRECAUTIONS/LIMITATIONS, REHAB EVAL
fall precautions
Psychological preparation for procedure was provided through pictures and medical materials. Parental support and preparation was provided.

## 2020-01-02 ENCOUNTER — APPOINTMENT (OUTPATIENT)
Dept: HEPATOLOGY | Facility: CLINIC | Age: 80
End: 2020-01-02

## 2020-02-28 ENCOUNTER — LABORATORY RESULT (OUTPATIENT)
Age: 80
End: 2020-02-28

## 2020-03-09 ENCOUNTER — APPOINTMENT (OUTPATIENT)
Dept: HEPATOLOGY | Facility: CLINIC | Age: 80
End: 2020-03-09
Payer: MEDICARE

## 2020-03-09 VITALS
SYSTOLIC BLOOD PRESSURE: 99 MMHG | BODY MASS INDEX: 32.65 KG/M2 | HEART RATE: 52 BPM | DIASTOLIC BLOOD PRESSURE: 53 MMHG | TEMPERATURE: 97.5 F | RESPIRATION RATE: 16 BRPM | HEIGHT: 65 IN | WEIGHT: 196 LBS

## 2020-03-09 DIAGNOSIS — D50.9 IRON DEFICIENCY ANEMIA, UNSPECIFIED: ICD-10-CM

## 2020-03-09 DIAGNOSIS — K55.20 ANGIODYSPLASIA OF COLON W/OUT HEMORRHAGE: ICD-10-CM

## 2020-03-09 DIAGNOSIS — N17.9 ACUTE KIDNEY FAILURE, UNSPECIFIED: ICD-10-CM

## 2020-03-09 PROCEDURE — 99215 OFFICE O/P EST HI 40 MIN: CPT

## 2020-03-09 RX ORDER — LANSOPRAZOLE, AMOXICILLIN, CLARITHROMYCIN 30-500-500
KIT ORAL
Qty: 1 | Refills: 0 | Status: DISCONTINUED | COMMUNITY
Start: 2019-10-08 | End: 2020-03-09

## 2020-03-09 RX ORDER — LACTULOSE 10 G/15ML
10 SOLUTION ORAL 3 TIMES DAILY
Qty: 2 | Refills: 2 | Status: ACTIVE | COMMUNITY
Start: 1900-01-01 | End: 1900-01-01

## 2020-03-09 NOTE — ASSESSMENT
[FreeTextEntry1] : Mr. 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 (off transplant list at [facility] as improved), alcohol relapse in 5/2018 with esophageal variceal bleed and a MELD-Na of 15, banded; longstanding obesity BMI32, porcelain gallbladder, a liver lesion of unclear etiology, and an SMA stenosis, and a R lower pole renal lesion; constipation.\par Hospitalization 10/2019 with severe RUQ pain, found SIRS possibly due to pancreatitis (mild pancreatic edema but normal lipase), and cholelithiasis treated with ERCP and stone extraction, course c/b hematochezia attributed to medium-sized hemorrhoids, also had colonic AVMs that were cauterized.\par \par - recent hospitaliation since last visit as above, s/p ERCP with plastic stent placement, AXR showed that the stent had not disappeared spontaneously\par \par - cholelithiasis: Abdominal XR 12/29/19 showed stent still present \par \par - H. pylori with inactive chronic gastritis, but peptic duodenitis, s/p treatment in October 2019\par \par - Alcoholic/WALL cirrhosis, obesity BMI 32;  MELD 15 on 7/2/19\par - variceal bleed 8/2018, banded. Last EGD on 2/27/19: no varices. Is on propranolol 10 mg bid.\par - encephalopathy: none - had some years ago, since then on lactulose once or twice daily for constipation\par - ascites: none; leg edema trace on lasix 40 mg/d\par - HCC: negative CT 10/2019\par - CKD 3, creatinine increased from 1.6 in October to 2.0, possibly acute kidney injury vs. 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 Pt. will call 1 day later. If stent still present, will refer to Dr. Mckeon for removal\par - H. pylori: stop taking pantoprazole. H. pylori stool test in 5 weeks\par - h/o variceal bleed, small varices: continue propranolol 10 mg bid, repeat EGD in 9/2020 after 1 year\par - HCC screening: next in 4/2020, before next visit\par \par - constipation: continue lactulose, take TID. If pain does not resolve on PPI, and he continues to have bloating with pain, will switch to another medication.\par - continue alcohol abstinence\par - gallstones, porcelain gallbladder: surgery too risky\par - return in 6 weeks after repeat labs\par \par \par

## 2020-03-09 NOTE — CONSULT LETTER
[Dear  ___] : Dear  [unfilled], [Courtesy Letter:] : I had the pleasure of seeing your patient, [unfilled], in my office today. [Please see my note below.] : Please see my note below. [Referral Closing:] : Thank you very much for seeing this patient.  If you have any questions, please do not hesitate to contact me. [Sincerely,] : Sincerely, [FreeTextEntry2] : Brian Pizarro (PCP) [FreeTextEntry3] : Richie Mcgraw MD\par , [facility]\par General Hepatology and Gastroenterology\par [facility]\par [facility]\par [address] [place] [address]\[place] [address]\par [phone]\par

## 2020-03-09 NOTE — HISTORY OF PRESENT ILLNESS
[Alcohol Abuse] : alcohol abuse [Malaise] : denies malaise [Fever] : denies fever [Diffuse Joint Pain (Arthralgias)] : arthralgias stable [Muscle Aches, Generalized (Myalgias)] : denies myalgias [Yellow Skin Or Eyes (Jaundice)] : denies jaundice [Abdominal Pain] : denies abdominal pain [Urine Tests Nonspecific Abnormal Findings Biliuria] : denies dark urine [Light Colored Bowel Movement (Acholic Stools)] : denies pale stools [Insomnia] : insomnia stable [Skin: Rash] : denies rash [Itching (Pruritus)] : denies pruritus [Shortness Of Breath] : denies shortness of breath [Needlestick Exposure] : no needlestick exposure [Infected Sexual Partner] : no infected sexual partner [IV Drug Use] : no IV drug use [Tattoo] : no tattoos [Body Piercing] : no body piercing [Hemodialysis] : no hemodialysis [Transfusion before 1992] : no transfusion before 1992 [Transplant before 1992] : no transplant before 1992 [Incarceration] : no incarceration [Autoimmune Disorder] : no autoimmune disorder [Household Contact to HBV] : no household contact to HBV [Travel to Endemic Area] : no travel to an endemic area [Occupational Exposure] : no occupational exposure [Cocaine Use] : no cocaine use [Wt Gain ___ Lbs] : no recent weight gain [Wt Loss ___ Lbs] : no recent weight loss [de-identified] : Mr. 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 He was discharged with Propranolol 20 mg bid, lactulose 10 mg/d for constipation - confused years ago.\par Hospitalization 10/2019 with severe RUQ pain, found SIRS and cholelithiasis treated with ERCP and stone extraction, course c/b hematochezia attributed to medium-sized hemorrhoids, also had colonic AVMs that were cauterized.\par - 3/26/19: doing well, rib pain has resolved. Is taking laculose 10 g 1 to 3 times daily for constipation, also propranolol 10 bid, pantoprazole 40 for GERD. Lost 4 lbs, BMI still 31.95. US was non-diagnostic. EGD showed no varices.\par - 2/12/19: reports resolved dyspepsia (pain on empty stomach), takes lactulose three times per day for constipation, has 1 BM/d. No edema, not on diuretics.\par - 1/7/18: here one month early for left-sided abdominal pain that started 1 week ago. He as apparently visibly bloated with abdominal distension yesterday - him and wife report that is is not there anymore today. He stopped PPI last visit. Pain occurs on empty stomach. He also feels gas, improved when he did take some pantoprazole that he still had, after 30 minutes or after drinking milk. Takes lactulose BID instead of TID, is unsure of BM - had some small BM 1 or 2 days ago. \par \par - 11/5/18: doing well, last alcohol 8/2018. Takes Advil for hip pain.\par \par Workup:\par - 11/5/19 ERCP (Mike): CBD stone removed, papillotomy, plastic stent placed. F/u with AXR to see if stent still present.\par - 11/01/19 (Lawrence, me): moderate medium sized hemorrhoids - likely source of hematochezia. 5mm polyp, AVMs cauterized: 1 small, 2 medium-sized. Good prep.\par - 10/28/19 CT abdomen pelvis: Cholelithiasis and porcelain gallbladder are again noted. Choledocholithiasis, with mild dilatation of the common bile duct. Cirrhotic configuration of the liver.\par Minimal infiltration of the fat adjacent to the pancreatic head and proximal duodenum, raising consideration of mild pancreatitis and/or duodenitis. Complex mass right rectus abdominis 3 cm unchanged from 5/2019.\par - 9/11/19 EGD: small esophageal varices, too small to band. Normal stomach, biopsied. Semi-circular ring of granular, erythematous mucosa 1st portion of duodenum, biopsied. Path: active erosive peptic duodenitis, chronic inactive gastritis, rare H. pylori on Warthin-Starry stain.\par - 9/11/19 colonoscopy: 5mm polyp, 2 medium-sized AVMs and several small ones ascending colon, 2 small AVMs in rectum, all APC'd. Small rectal varices, small internal hemorrhoids. Excellent prep. Path: tubular adenoma.\par - 6/24/19 FOBT: negative\par - 5/22/19 CT abdomen: advanced cirrhosis, no focal lesion, distal common duct calculus also visualized in retrospect on MRI 9/13/19, partial porcelain ggallbladder and gallstones. Spleen normal, \par - 3/5/19 US abdomen: poor visualization. Cirrhosis. Grossly, no obvious liver lesion. No ascites. Normal spleen; stenosis and calcification origin SMA. 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 Scar in lower esophagus from prior banding. Mild GAVE. Arytenoid edema. Repeat in 1 year. \par - MRI 9/13/18 (gadavist): liver lesions not identified. Cholelithiasis, renal mass resolved - was possibly inflammatory.  3.9 cm abdominal wall lesion (right anteriorly), no signif change from 5/2018. May be hematoma.\par - EGD 9/12/18: esophagus: no varices. Scars from prior banding. stomach: portal HTN gastropathy, erythema. Biopsied. Duodenum: normal. \par - CT 5/25/18: cholelithiasis and porcelain GB, renal lesion right lower pole\par - MRI abdomen 6/7/18: LI-RADS 3 lesions right hepatic dome and lat. segment L lobe. Rec 4 mo f/u. Gallbladder: gallstones. 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 Renal abnormality resolved - may have been inflammation or infection.\par \par - EGD 5/27/218: large varices, completely eradicated, banded. clotted blood in stomach.\par Last colonoscopy: ~2014, result "ok"\par \par Last alcohol: 5/27/18 when hospitalized.

## 2020-03-26 NOTE — PROGRESS NOTE ADULT - SUBJECTIVE AND OBJECTIVE BOX
Patient oked phone visit with Steven Olmos on 4/6/2020   Matt Rivas MD, PhD  PGY1  [phone] / [phone]    Interval Hx / Subjective: 3 BM yesterday, light colored; mild abd pain; cold at night, relieved by blankets; no other acute complaints; denied lightheadedness, HA, CP, SOB, dysuria, suprapubic pain, flank pain, joint pain other than arthritis, itchiness, fever, chills; tolerating clear liquid     MEDICATIONS  (STANDING):  influenza   Vaccine 0.5 milliLiter(s) IntraMuscular once  lactulose Syrup 20 Gram(s) Oral three times a day  octreotide  Infusion 50 MICROgram(s)/Hr (10 mL/Hr) IV Continuous <Continuous>  pantoprazole  Injectable 40 milliGRAM(s) IV Push two times a day  piperacillin/tazobactam IVPB.. 3.375 Gram(s) IV Intermittent every 8 hours  propranolol 10 milliGRAM(s) Oral two times a day    MEDICATIONS  (PRN):  acetaminophen  Tablet .. 650 milliGRAM(s) Oral every 6 hours PRN Mild Pain (1 - 3), Moderate Pain (4 - 6)    Objective:  Vital Signs Last 24 Hrs  T(C): 36.3 (01 Nov 2019 05:24), Max: 36.6 (31 Oct 2019 12:43)  T(F): 97.4 (01 Nov 2019 05:24), Max: 97.9 (31 Oct 2019 21:57)  HR: 51 (01 Nov 2019 05:24) (51 - 92)  BP: 126/61 (01 Nov 2019 05:24) (102/64 - 147/82)  BP(mean): --  RR: 18 (01 Nov 2019 05:24) (17 - 18)  SpO2: 100% (01 Nov 2019 05:24) (97% - 100%)    Physical Exam:  GEN: asleep in bed, NAD  CV: RRR, S1, S2; no M/R/G  PULM: LCTAB; not using accessory muscles  ABD: diffuse upper abd tenderness, less than previous; normal bowel sounds; non-distended, soft; no rebound, no guarding  Extremities: no clubbing, cyanosis; no edema;   NEURO: no FND                          12.9   7.69  )-----------( 229      ( 01 Nov 2019 05:50 )             42.3     11-01    135  |  103  |  25<H>  ----------------------------<  129<H>  3.8   |  20<L>  |  1.62<H>    Ca    8.6      01 Nov 2019 05:50  Phos  3.0     11-01  Mg     1.7     11-01    TPro  6.5  /  Alb  3.1<L>  /  TBili  0.5  /  DBili  x   /  AST  32  /  ALT  26  /  AlkPhos  92  11-01    Culture - Urine (10.29.19 @ 04:08)    -  Trimethoprim/Sulfamethoxazole: S <=0.5/9.5 ONIEL    Culture - Urine:   COLONY COUNT: 10,000-49,000 CFU/mL    -  Tobramycin: S <=2 ONIEL    -  Tigecycline: S <=1 ONIEL    -  Piperacillin/Tazobactam: S <=8 ONIEL    -  Nitrofurantoin: S <=32 ONIEL    -  Meropenem: S <=1 ONIEL    -  Levofloxacin: R >4 ONIEL    -  Imipenem: S <=1 ONIEL    -  Gentamicin: S <=1 ONIEL    -  Ertapenem: S <=0.5 ONIEL    -  Ceftriaxone: S <=1 ONIEL    -  Cefoxitin: S <=4 ONIEL    -  Ceftazidime: S <=1 ONIEL    -  Cefepime: S <=2 ONIEL    -  Cefazolin: S <=2 ONIEL    -  Aztreonam: S <=4 ONIEL    -  Ampicillin/Sulbactam: S <=4/2 ONIEL    -  Ampicillin: S <=2 ONIEL    -  Amikacin: S <=8 ONIEL    Specimen Source: URINE MIDSTREAM    Organism Identification: Escherichia coli    Organism: Escherichia coli    Method Type: NEGATIVE ONIEL 43

## 2020-04-01 ENCOUNTER — INPATIENT (INPATIENT)
Facility: HOSPITAL | Age: 80
LOS: 4 days | Discharge: HOME | DRG: 378 | End: 2020-04-06
Attending: INTERNAL MEDICINE | Admitting: HOSPITALIST
Payer: MEDICARE

## 2020-04-01 VITALS
HEART RATE: 82 BPM | SYSTOLIC BLOOD PRESSURE: 100 MMHG | HEIGHT: 69 IN | DIASTOLIC BLOOD PRESSURE: 60 MMHG | OXYGEN SATURATION: 99 % | WEIGHT: 192.02 LBS | RESPIRATION RATE: 20 BRPM | TEMPERATURE: 98 F

## 2020-04-01 DIAGNOSIS — K92.2 GASTROINTESTINAL HEMORRHAGE, UNSPECIFIED: ICD-10-CM

## 2020-04-01 DIAGNOSIS — Z98.890 OTHER SPECIFIED POSTPROCEDURAL STATES: Chronic | ICD-10-CM

## 2020-04-01 LAB
ALBUMIN SERPL ELPH-MCNC: 3.3 G/DL — SIGNIFICANT CHANGE UP (ref 3.3–5)
ALP SERPL-CCNC: 90 U/L — SIGNIFICANT CHANGE UP (ref 40–120)
ALT FLD-CCNC: 13 U/L — SIGNIFICANT CHANGE UP (ref 10–45)
ANION GAP SERPL CALC-SCNC: 12 MMOL/L — SIGNIFICANT CHANGE UP (ref 5–17)
ANION GAP SERPL CALC-SCNC: 16 MMOL/L — SIGNIFICANT CHANGE UP (ref 5–17)
APTT BLD: 27.9 SEC — SIGNIFICANT CHANGE UP (ref 27.5–36.3)
AST SERPL-CCNC: 16 U/L — SIGNIFICANT CHANGE UP (ref 10–40)
BASOPHILS # BLD AUTO: 0.04 K/UL — SIGNIFICANT CHANGE UP (ref 0–0.2)
BASOPHILS NFR BLD AUTO: 0.4 % — SIGNIFICANT CHANGE UP (ref 0–2)
BILIRUB SERPL-MCNC: 0.3 MG/DL — SIGNIFICANT CHANGE UP (ref 0.2–1.2)
BLD GP AB SCN SERPL QL: NEGATIVE — SIGNIFICANT CHANGE UP
BUN SERPL-MCNC: 69 MG/DL — HIGH (ref 7–23)
BUN SERPL-MCNC: 72 MG/DL — HIGH (ref 7–23)
CALCIUM SERPL-MCNC: 9 MG/DL — SIGNIFICANT CHANGE UP (ref 8.4–10.5)
CALCIUM SERPL-MCNC: 9.8 MG/DL — SIGNIFICANT CHANGE UP (ref 8.4–10.5)
CHLORIDE SERPL-SCNC: 105 MMOL/L — SIGNIFICANT CHANGE UP (ref 96–108)
CHLORIDE SERPL-SCNC: 107 MMOL/L — SIGNIFICANT CHANGE UP (ref 96–108)
CO2 SERPL-SCNC: 18 MMOL/L — LOW (ref 22–31)
CO2 SERPL-SCNC: 20 MMOL/L — LOW (ref 22–31)
CREAT SERPL-MCNC: 1.45 MG/DL — HIGH (ref 0.5–1.3)
CREAT SERPL-MCNC: 1.6 MG/DL — HIGH (ref 0.5–1.3)
EOSINOPHIL # BLD AUTO: 0 K/UL — SIGNIFICANT CHANGE UP (ref 0–0.5)
EOSINOPHIL NFR BLD AUTO: 0 % — SIGNIFICANT CHANGE UP (ref 0–6)
GLUCOSE SERPL-MCNC: 147 MG/DL — HIGH (ref 70–99)
GLUCOSE SERPL-MCNC: 191 MG/DL — HIGH (ref 70–99)
HCT VFR BLD CALC: 29.5 % — LOW (ref 39–50)
HGB BLD-MCNC: 9.2 G/DL — LOW (ref 13–17)
IMM GRANULOCYTES NFR BLD AUTO: 0.8 % — SIGNIFICANT CHANGE UP (ref 0–1.5)
INR BLD: 1.45 RATIO — HIGH (ref 0.88–1.16)
LYMPHOCYTES # BLD AUTO: 1.03 K/UL — SIGNIFICANT CHANGE UP (ref 1–3.3)
LYMPHOCYTES # BLD AUTO: 9.8 % — LOW (ref 13–44)
MCHC RBC-ENTMCNC: 26.1 PG — LOW (ref 27–34)
MCHC RBC-ENTMCNC: 31.2 GM/DL — LOW (ref 32–36)
MCV RBC AUTO: 83.6 FL — SIGNIFICANT CHANGE UP (ref 80–100)
MONOCYTES # BLD AUTO: 0.44 K/UL — SIGNIFICANT CHANGE UP (ref 0–0.9)
MONOCYTES NFR BLD AUTO: 4.2 % — SIGNIFICANT CHANGE UP (ref 2–14)
NEUTROPHILS # BLD AUTO: 8.87 K/UL — HIGH (ref 1.8–7.4)
NEUTROPHILS NFR BLD AUTO: 84.8 % — HIGH (ref 43–77)
NRBC # BLD: 0 /100 WBCS — SIGNIFICANT CHANGE UP (ref 0–0)
OB PNL STL: POSITIVE
PLATELET # BLD AUTO: 164 K/UL — SIGNIFICANT CHANGE UP (ref 150–400)
POTASSIUM SERPL-MCNC: 5.6 MMOL/L — HIGH (ref 3.5–5.3)
POTASSIUM SERPL-MCNC: 6.3 MMOL/L — CRITICAL HIGH (ref 3.5–5.3)
POTASSIUM SERPL-SCNC: 5.6 MMOL/L — HIGH (ref 3.5–5.3)
POTASSIUM SERPL-SCNC: 6.3 MMOL/L — CRITICAL HIGH (ref 3.5–5.3)
PROT SERPL-MCNC: 7 G/DL — SIGNIFICANT CHANGE UP (ref 6–8.3)
PROTHROM AB SERPL-ACNC: 16.9 SEC — HIGH (ref 10–12.9)
RBC # BLD: 3.53 M/UL — LOW (ref 4.2–5.8)
RBC # FLD: 17.1 % — HIGH (ref 10.3–14.5)
RH IG SCN BLD-IMP: POSITIVE — SIGNIFICANT CHANGE UP
RH IG SCN BLD-IMP: POSITIVE — SIGNIFICANT CHANGE UP
SODIUM SERPL-SCNC: 139 MMOL/L — SIGNIFICANT CHANGE UP (ref 135–145)
SODIUM SERPL-SCNC: 139 MMOL/L — SIGNIFICANT CHANGE UP (ref 135–145)
WBC # BLD: 10.46 K/UL — SIGNIFICANT CHANGE UP (ref 3.8–10.5)
WBC # FLD AUTO: 10.46 K/UL — SIGNIFICANT CHANGE UP (ref 3.8–10.5)

## 2020-04-01 PROCEDURE — 93010 ELECTROCARDIOGRAM REPORT: CPT

## 2020-04-01 PROCEDURE — 74177 CT ABD & PELVIS W/CONTRAST: CPT | Mod: 26

## 2020-04-01 PROCEDURE — 99285 EMERGENCY DEPT VISIT HI MDM: CPT

## 2020-04-01 RX ORDER — DEXTROSE 50 % IN WATER 50 %
50 SYRINGE (ML) INTRAVENOUS ONCE
Refills: 0 | Status: COMPLETED | OUTPATIENT
Start: 2020-04-01 | End: 2020-04-01

## 2020-04-01 RX ORDER — SODIUM CHLORIDE 9 MG/ML
500 INJECTION INTRAMUSCULAR; INTRAVENOUS; SUBCUTANEOUS ONCE
Refills: 0 | Status: COMPLETED | OUTPATIENT
Start: 2020-04-01 | End: 2020-04-01

## 2020-04-01 RX ORDER — INSULIN HUMAN 100 [IU]/ML
10 INJECTION, SOLUTION SUBCUTANEOUS ONCE
Refills: 0 | Status: COMPLETED | OUTPATIENT
Start: 2020-04-01 | End: 2020-04-01

## 2020-04-01 RX ADMIN — SODIUM CHLORIDE 500 MILLILITER(S): 9 INJECTION INTRAMUSCULAR; INTRAVENOUS; SUBCUTANEOUS at 21:26

## 2020-04-01 RX ADMIN — INSULIN HUMAN 10 UNIT(S): 100 INJECTION, SOLUTION SUBCUTANEOUS at 21:55

## 2020-04-01 RX ADMIN — Medication 50 MILLILITER(S): at 21:56

## 2020-04-01 NOTE — ED PROVIDER NOTE - OBJECTIVE STATEMENT
78yo M with PMH liver cirrhosis c/b varices and encephalopathy, CKD, anemia, porcelain gallbladder, SMA stenosis presenting with black stool x 1 week. 80yo M with PMH liver cirrhosis c/b varices and encephalopathy, CKD, anemia, porcelain gallbladder, SMA stenosis presenting with black stool x 1 week. Pt is A&O x 3 but poor historian. Reports h/o GI bleed in the past. Noticed dark stool x 1 week. No BRBPR. Also reports diffuse lower abdominal pain and poor appetite. + dizziness. Denies fever/chills, CP, SOB, vomiting, diarrhea, dysuria. 80yo M with PMH liver cirrhosis c/b varices and encephalopathy, CKD, anemia, porcelain gallbladder, SMA stenosis presenting with black stool x 1 week. Pt is A&O x 3 but poor historian. Reports h/o GI bleed in the past. Noticed dark stool x 1 week. No BRBPR. Also reports diffuse lower abdominal pain and poor appetite. + dizziness. Denies fever/chills, CP, SOB, vomiting, diarrhea, dysuria.    PCP JITENDRA Mauricio ([place])

## 2020-04-01 NOTE — ED ADULT NURSE NOTE - NS ED NURSE LEVEL OF CONSCIOUSNESS ORIENTATION
Oriented - self; Oriented - place; Oriented - time
I will SWITCH the dose or number of times a day I take the medications listed below when I get home from the hospital:  None

## 2020-04-01 NOTE — ED PROVIDER NOTE - CPE EDP MUSC NORM
[facility]  [address] [address] [address]                  [phone]   March 6, 2017    Eva Celis  [address] [place] [id number]      Dear Eva,    Here is a summary of your recent test results:    I left a message on your home phone; your mumps test came back negative.    Let me know if your symptoms are worsening, and we may need to try again for another scan of the neck.    Hope you're well.    Your test results are enclosed.      Please contact me if you have any questions.           Thank you very much for choosing [facility]    Best regards,    Xavier Giordano MD        Results for orders placed or performed in visit on 03/02/17   Mumps Antibody IgG   Result Value Ref Range    Mumps Antibody IgG 3.7 (H) 0.0 - 0.8 AI   Mumps antibody IgM   Result Value Ref Range    Mumps HANNAH IGM 0.15      
normal...

## 2020-04-01 NOTE — ED ADULT NURSE NOTE - OBJECTIVE STATEMENT
pt here for rectal bleeding  he "has had tubes placed in my intestines twice"  pt is alert and oriented though unsure of his medical complaints

## 2020-04-01 NOTE — ED PROVIDER NOTE - ATTENDING CONTRIBUTION TO CARE
Attending MD Clark:   I personally have seen and examined this patient.  Physician assistant note reviewed and agree on plan of care and except where noted.  See below for details.     Seen in Pink    80M with PMH/PSH including alcoholic cirrhosis (no EtOH now), esophageal varices with history of bleed s/p banding), GERD, h/o H. Pylori, Guillain [place] (distant), HTN, CKD, porcelain gallbladder, SMA stenosis presents to the ED with black stools for a week.  Reports history of GI bleed in past, denies bright red blood per rectum.  Reports associated lower abdominal pain and decreased appetite.  Denies chest pain, shortness of breath, palpitations, cough.  Denies dysuria, hematuria, change in urinary habits including frequency, urgency.     TO BE COMPLETED Attending MD Clark:   I personally have seen and examined this patient.  Physician assistant note reviewed and agree on plan of care and except where noted.  See below for details.     Seen in Pink    80M with PMH/PSH including alcoholic cirrhosis (no EtOH now), esophageal varices with history of bleed s/p banding), GERD, h/o H. Pylori, Guillain [place] (distant), HTN, CKD, porcelain gallbladder, SMA stenosis presents to the ED with black stools for a week.  Reports history of GI bleed in past, denies bright red blood per rectum.  Reports associated lower abdominal pain and decreased appetite.  Denies chest pain, shortness of breath, palpitations, cough.  Reports dizziness.  Denies dysuria, hematuria, change in urinary habits including frequency, urgency. Denies fevers, chills.  On exam, NAD, head NCAT, PERRL, FROM at neck, no tenderness to midline palpation, no stepoffs along length of spine, lungs CTAB with good inspiratory effort, no wheezing, no rhonchi, no rales, +S1S2, no m/r/g, abdomen soft with +BS, +mild tenderness diffusely across lower abdomen L>R, no rebound, no guarding, rectal exam as per PA documentation, black stools, ND, no CVAT, moving all extremities; A/P: 80M with black stools for a week in setting of previous GI bleed, DDx includes GI bleed, ?recurrence esophageal variceal bleed, will obtain labs looking for metabolic derangement, anemia, coag abnormality, will obtain EKG, CTAP for lower abdominal pain, will likely need admission

## 2020-04-02 ENCOUNTER — RESULT REVIEW (OUTPATIENT)
Age: 80
End: 2020-04-02

## 2020-04-02 DIAGNOSIS — Z02.9 ENCOUNTER FOR ADMINISTRATIVE EXAMINATIONS, UNSPECIFIED: ICD-10-CM

## 2020-04-02 DIAGNOSIS — I10 ESSENTIAL (PRIMARY) HYPERTENSION: ICD-10-CM

## 2020-04-02 DIAGNOSIS — R10.30 LOWER ABDOMINAL PAIN, UNSPECIFIED: ICD-10-CM

## 2020-04-02 DIAGNOSIS — D50.0 IRON DEFICIENCY ANEMIA SECONDARY TO BLOOD LOSS (CHRONIC): ICD-10-CM

## 2020-04-02 DIAGNOSIS — Z29.9 ENCOUNTER FOR PROPHYLACTIC MEASURES, UNSPECIFIED: ICD-10-CM

## 2020-04-02 DIAGNOSIS — E87.5 HYPERKALEMIA: ICD-10-CM

## 2020-04-02 DIAGNOSIS — N18.3 CHRONIC KIDNEY DISEASE, STAGE 3 (MODERATE): ICD-10-CM

## 2020-04-02 DIAGNOSIS — K70.30 ALCOHOLIC CIRRHOSIS OF LIVER WITHOUT ASCITES: ICD-10-CM

## 2020-04-02 LAB
ALBUMIN SERPL ELPH-MCNC: 2.9 G/DL — LOW (ref 3.3–5)
ALP SERPL-CCNC: 69 U/L — SIGNIFICANT CHANGE UP (ref 40–120)
ALT FLD-CCNC: 10 U/L — SIGNIFICANT CHANGE UP (ref 10–45)
ANION GAP SERPL CALC-SCNC: 12 MMOL/L — SIGNIFICANT CHANGE UP (ref 5–17)
APTT BLD: 29.3 SEC — SIGNIFICANT CHANGE UP (ref 27.5–36.3)
AST SERPL-CCNC: 12 U/L — SIGNIFICANT CHANGE UP (ref 10–40)
BASOPHILS # BLD AUTO: 0.03 K/UL — SIGNIFICANT CHANGE UP (ref 0–0.2)
BASOPHILS NFR BLD AUTO: 0.3 % — SIGNIFICANT CHANGE UP (ref 0–2)
BILIRUB SERPL-MCNC: 0.4 MG/DL — SIGNIFICANT CHANGE UP (ref 0.2–1.2)
BUN SERPL-MCNC: 72 MG/DL — HIGH (ref 7–23)
CALCIUM SERPL-MCNC: 8.8 MG/DL — SIGNIFICANT CHANGE UP (ref 8.4–10.5)
CHLORIDE SERPL-SCNC: 107 MMOL/L — SIGNIFICANT CHANGE UP (ref 96–108)
CO2 SERPL-SCNC: 20 MMOL/L — LOW (ref 22–31)
CREAT SERPL-MCNC: 1.5 MG/DL — HIGH (ref 0.5–1.3)
EOSINOPHIL # BLD AUTO: 0 K/UL — SIGNIFICANT CHANGE UP (ref 0–0.5)
EOSINOPHIL NFR BLD AUTO: 0 % — SIGNIFICANT CHANGE UP (ref 0–6)
GLUCOSE SERPL-MCNC: 148 MG/DL — HIGH (ref 70–99)
HCT VFR BLD CALC: 22.3 % — LOW (ref 39–50)
HCT VFR BLD CALC: 26 % — LOW (ref 39–50)
HGB BLD-MCNC: 7 G/DL — CRITICAL LOW (ref 13–17)
HGB BLD-MCNC: 8.1 G/DL — LOW (ref 13–17)
IMM GRANULOCYTES NFR BLD AUTO: 1 % — SIGNIFICANT CHANGE UP (ref 0–1.5)
INR BLD: 1.59 RATIO — HIGH (ref 0.88–1.16)
LACTATE SERPL-SCNC: 2.5 MMOL/L — HIGH (ref 0.7–2)
LYMPHOCYTES # BLD AUTO: 1.25 K/UL — SIGNIFICANT CHANGE UP (ref 1–3.3)
LYMPHOCYTES # BLD AUTO: 13 % — SIGNIFICANT CHANGE UP (ref 13–44)
MAGNESIUM SERPL-MCNC: 1.7 MG/DL — SIGNIFICANT CHANGE UP (ref 1.6–2.6)
MCHC RBC-ENTMCNC: 26.4 PG — LOW (ref 27–34)
MCHC RBC-ENTMCNC: 27 PG — SIGNIFICANT CHANGE UP (ref 27–34)
MCHC RBC-ENTMCNC: 31.2 GM/DL — LOW (ref 32–36)
MCHC RBC-ENTMCNC: 31.4 GM/DL — LOW (ref 32–36)
MCV RBC AUTO: 84.2 FL — SIGNIFICANT CHANGE UP (ref 80–100)
MCV RBC AUTO: 86.7 FL — SIGNIFICANT CHANGE UP (ref 80–100)
MONOCYTES # BLD AUTO: 0.53 K/UL — SIGNIFICANT CHANGE UP (ref 0–0.9)
MONOCYTES NFR BLD AUTO: 5.5 % — SIGNIFICANT CHANGE UP (ref 2–14)
NEUTROPHILS # BLD AUTO: 7.72 K/UL — HIGH (ref 1.8–7.4)
NEUTROPHILS NFR BLD AUTO: 80.2 % — HIGH (ref 43–77)
NRBC # BLD: 0 /100 WBCS — SIGNIFICANT CHANGE UP (ref 0–0)
NRBC # BLD: 0 /100 WBCS — SIGNIFICANT CHANGE UP (ref 0–0)
PHOSPHATE SERPL-MCNC: 2.2 MG/DL — LOW (ref 2.5–4.5)
PLATELET # BLD AUTO: 147 K/UL — LOW (ref 150–400)
PLATELET # BLD AUTO: 150 K/UL — SIGNIFICANT CHANGE UP (ref 150–400)
POTASSIUM SERPL-MCNC: 5.2 MMOL/L — SIGNIFICANT CHANGE UP (ref 3.5–5.3)
POTASSIUM SERPL-SCNC: 5.2 MMOL/L — SIGNIFICANT CHANGE UP (ref 3.5–5.3)
PROT SERPL-MCNC: 5.8 G/DL — LOW (ref 6–8.3)
PROTHROM AB SERPL-ACNC: 18.6 SEC — HIGH (ref 10–12.9)
RBC # BLD: 2.65 M/UL — LOW (ref 4.2–5.8)
RBC # BLD: 3 M/UL — LOW (ref 4.2–5.8)
RBC # FLD: 16.4 % — HIGH (ref 10.3–14.5)
RBC # FLD: 17.2 % — HIGH (ref 10.3–14.5)
SODIUM SERPL-SCNC: 139 MMOL/L — SIGNIFICANT CHANGE UP (ref 135–145)
WBC # BLD: 9.25 K/UL — SIGNIFICANT CHANGE UP (ref 3.8–10.5)
WBC # BLD: 9.63 K/UL — SIGNIFICANT CHANGE UP (ref 3.8–10.5)
WBC # FLD AUTO: 9.25 K/UL — SIGNIFICANT CHANGE UP (ref 3.8–10.5)
WBC # FLD AUTO: 9.63 K/UL — SIGNIFICANT CHANGE UP (ref 3.8–10.5)

## 2020-04-02 PROCEDURE — 88312 SPECIAL STAINS GROUP 1: CPT | Mod: 26

## 2020-04-02 PROCEDURE — 88305 TISSUE EXAM BY PATHOLOGIST: CPT | Mod: 26

## 2020-04-02 PROCEDURE — 43255 EGD CONTROL BLEEDING ANY: CPT

## 2020-04-02 PROCEDURE — 99223 1ST HOSP IP/OBS HIGH 75: CPT | Mod: GC

## 2020-04-02 RX ORDER — PANTOPRAZOLE SODIUM 20 MG/1
8 TABLET, DELAYED RELEASE ORAL
Qty: 80 | Refills: 0 | Status: DISCONTINUED | OUTPATIENT
Start: 2020-04-02 | End: 2020-04-03

## 2020-04-02 RX ORDER — SODIUM CHLORIDE 9 MG/ML
1000 INJECTION INTRAMUSCULAR; INTRAVENOUS; SUBCUTANEOUS
Refills: 0 | Status: DISCONTINUED | OUTPATIENT
Start: 2020-04-02 | End: 2020-04-04

## 2020-04-02 RX ORDER — CEFTRIAXONE 500 MG/1
1000 INJECTION, POWDER, FOR SOLUTION INTRAMUSCULAR; INTRAVENOUS EVERY 24 HOURS
Refills: 0 | Status: DISCONTINUED | OUTPATIENT
Start: 2020-04-02 | End: 2020-04-03

## 2020-04-02 RX ORDER — SODIUM ZIRCONIUM CYCLOSILICATE 10 G/10G
5 POWDER, FOR SUSPENSION ORAL ONCE
Refills: 0 | Status: DISCONTINUED | OUTPATIENT
Start: 2020-04-02 | End: 2020-04-02

## 2020-04-02 RX ORDER — ACETAMINOPHEN 500 MG
500 TABLET ORAL EVERY 6 HOURS
Refills: 0 | Status: DISCONTINUED | OUTPATIENT
Start: 2020-04-02 | End: 2020-04-06

## 2020-04-02 RX ORDER — LACTULOSE 10 G/15ML
15 SOLUTION ORAL
Refills: 0 | Status: DISCONTINUED | OUTPATIENT
Start: 2020-04-02 | End: 2020-04-02

## 2020-04-02 RX ORDER — OCTREOTIDE ACETATE 200 UG/ML
50 INJECTION, SOLUTION INTRAVENOUS; SUBCUTANEOUS ONCE
Refills: 0 | Status: COMPLETED | OUTPATIENT
Start: 2020-04-02 | End: 2020-04-02

## 2020-04-02 RX ORDER — SODIUM CHLORIDE 9 MG/ML
500 INJECTION INTRAMUSCULAR; INTRAVENOUS; SUBCUTANEOUS ONCE
Refills: 0 | Status: COMPLETED | OUTPATIENT
Start: 2020-04-02 | End: 2020-04-02

## 2020-04-02 RX ORDER — CEFTRIAXONE 500 MG/1
1000 INJECTION, POWDER, FOR SOLUTION INTRAMUSCULAR; INTRAVENOUS EVERY 24 HOURS
Refills: 0 | Status: DISCONTINUED | OUTPATIENT
Start: 2020-04-02 | End: 2020-04-02

## 2020-04-02 RX ORDER — PHYTONADIONE (VIT K1) 5 MG
10 TABLET ORAL DAILY
Refills: 0 | Status: DISCONTINUED | OUTPATIENT
Start: 2020-04-02 | End: 2020-04-03

## 2020-04-02 RX ORDER — OCTREOTIDE ACETATE 200 UG/ML
50 INJECTION, SOLUTION INTRAVENOUS; SUBCUTANEOUS
Qty: 500 | Refills: 0 | Status: DISCONTINUED | OUTPATIENT
Start: 2020-04-02 | End: 2020-04-02

## 2020-04-02 RX ORDER — PANTOPRAZOLE SODIUM 20 MG/1
80 TABLET, DELAYED RELEASE ORAL ONCE
Refills: 0 | Status: COMPLETED | OUTPATIENT
Start: 2020-04-02 | End: 2020-04-02

## 2020-04-02 RX ORDER — SODIUM CHLORIDE 9 MG/ML
1000 INJECTION INTRAMUSCULAR; INTRAVENOUS; SUBCUTANEOUS ONCE
Refills: 0 | Status: COMPLETED | OUTPATIENT
Start: 2020-04-02 | End: 2020-04-02

## 2020-04-02 RX ORDER — PANTOPRAZOLE SODIUM 20 MG/1
40 TABLET, DELAYED RELEASE ORAL
Refills: 0 | Status: DISCONTINUED | OUTPATIENT
Start: 2020-04-02 | End: 2020-04-02

## 2020-04-02 RX ORDER — SODIUM CHLORIDE 9 MG/ML
500 INJECTION INTRAMUSCULAR; INTRAVENOUS; SUBCUTANEOUS ONCE
Refills: 0 | Status: DISCONTINUED | OUTPATIENT
Start: 2020-04-02 | End: 2020-04-02

## 2020-04-02 RX ADMIN — Medication 10 MILLIGRAM(S): at 09:35

## 2020-04-02 RX ADMIN — PANTOPRAZOLE SODIUM 10 MG/HR: 20 TABLET, DELAYED RELEASE ORAL at 19:00

## 2020-04-02 RX ADMIN — OCTREOTIDE ACETATE 50 MICROGRAM(S): 200 INJECTION, SOLUTION INTRAVENOUS; SUBCUTANEOUS at 05:12

## 2020-04-02 RX ADMIN — PANTOPRAZOLE SODIUM 80 MILLIGRAM(S): 20 TABLET, DELAYED RELEASE ORAL at 05:26

## 2020-04-02 RX ADMIN — LACTULOSE 15 GRAM(S): 10 SOLUTION ORAL at 05:26

## 2020-04-02 RX ADMIN — PANTOPRAZOLE SODIUM 40 MILLIGRAM(S): 20 TABLET, DELAYED RELEASE ORAL at 09:34

## 2020-04-02 RX ADMIN — SODIUM CHLORIDE 2000 MILLILITER(S): 9 INJECTION INTRAMUSCULAR; INTRAVENOUS; SUBCUTANEOUS at 09:10

## 2020-04-02 RX ADMIN — PANTOPRAZOLE SODIUM 40 MILLIGRAM(S): 20 TABLET, DELAYED RELEASE ORAL at 16:20

## 2020-04-02 RX ADMIN — Medication 1 TABLET(S): at 16:21

## 2020-04-02 RX ADMIN — SODIUM CHLORIDE 1000 MILLILITER(S): 9 INJECTION INTRAMUSCULAR; INTRAVENOUS; SUBCUTANEOUS at 05:13

## 2020-04-02 RX ADMIN — CEFTRIAXONE 100 MILLIGRAM(S): 500 INJECTION, POWDER, FOR SOLUTION INTRAMUSCULAR; INTRAVENOUS at 05:12

## 2020-04-02 RX ADMIN — OCTREOTIDE ACETATE 10 MICROGRAM(S)/HR: 200 INJECTION, SOLUTION INTRAVENOUS; SUBCUTANEOUS at 16:21

## 2020-04-02 RX ADMIN — OCTREOTIDE ACETATE 10 MICROGRAM(S)/HR: 200 INJECTION, SOLUTION INTRAVENOUS; SUBCUTANEOUS at 07:20

## 2020-04-02 RX ADMIN — Medication 500 MILLIGRAM(S): at 05:27

## 2020-04-02 RX ADMIN — SODIUM CHLORIDE 500 MILLILITER(S): 9 INJECTION INTRAMUSCULAR; INTRAVENOUS; SUBCUTANEOUS at 02:21

## 2020-04-02 NOTE — H&P ADULT - PROBLEM SELECTOR PLAN 3
decompensated ETOH cirrhosis w/ esophageal and rectal varices on propanolol, hx hepatic encephalopathy on lactulose, neg HCC w/u  MELD-Na 16, stable  c/w lactulose, propanolol  GI fu

## 2020-04-02 NOTE — H&P ADULT - PROBLEM SELECTOR PLAN 1
Hgb 9.2, last Hgb 15.0 2/2020 (though suspect hemoconcentrated, baseline appears to be 11-12)  p/w ?BRBPR and black tarry stools, c/f GIB  currently HD stable  s/p pantoprazole 80IVP, started on 40IV BID  will start octreotide, CTX for SBP ppx given hx esophageal varices - lower susp for variceal bleed given HD stable after 1wk  -other potential sources of bleed include colonic AVM, rectal varices, PUD  -no e/o bleeding lesions on CT abd/pelvis  -NPO for now pending GI consult in AM  -monitor Hgb q8hr  -maintain Hgb>7.0, active T+S Hgb 9.2, last Hgb 15.0 2/2020 (though suspect hemoconcentrated, baseline appears to be 11-12)  p/w ?BRBPR and black tarry stools, c/f GIB  currently HD stable  s/p pantoprazole 80IVP, started on 40IV BID  will start octreotide, CTX given hx esophageal varices - lower susp for variceal bleed given HD stable after 1wk  -other potential sources of bleed include colonic AVM, rectal varices, PUD  -no e/o bleeding lesions on CT abd/pelvis  -NPO for now pending GI consult in AM  -monitor Hgb q8hr  -maintain Hgb>7.0, active T+S

## 2020-04-02 NOTE — H&P ADULT - HISTORY OF PRESENT ILLNESS
Mr. 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 He was discharged with Propranolol 20 mg bid, lactulose 10 mg/d for constipation - confused years ago.    Hospitalization 10/2019 with severe RUQ pain, found SIRS possibly due to pancreatitis (mild pancreatic edema but normal lipase), and cholelithiasis treated with ERCP and stone extraction, course c/b hematochezia attributed to medium-sized hemorrhoids, also had colonic AVMs that were cauterized.    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, hospitalized x4mths w/ paralysis), decompensated alcoholic cirrhosis (now sober, off transplant list at [facility] as improved, w/ small esophgeal varices last EGD 9/19 w/ hx esophageal bleed s/p banding 2018 on propanolol, hx of encephalopathy on lactulose, neg HCC w/u as of 4/19, no ascites of 10/19), porcelain gallbladder, known R rectus sheath mass, liver lesion and R renal pole lesions of unclear etiology, SMA stenosis and recent hospitalizations in 11/2019 for severe RUQ pain 2/2 pancreatitis from cholelithiasis/choledocholithiasis s/p ERCP with stone extraction and f/u hospitalization with plastic stent placement w/o spontaneous dissolution and hematochezia 2/2 hemorrhoids, colonic AVMs (s/p cauterization) who p/w 1 melena and lower abdominal pain. Pt is A0x3, able to point to his pain and give nods to his medical history but is otherwise poor historian. Attempted to lynette wife Shannon x2, unable to reach. Per outpt GI chart note, wife had called reporting black tarry stools x24hrs, pale complexion and fatigue. Pt was rec'd to go the ED for consideration of colonoscopy. Pt reports tht his abdominal pain is lower in both lower quadrants, L>R, worsened by bowel movements, unchanged with meals though reports decreased PO intake. Also reports that he was having bright red bowel [id number]  and started having black tarry stools today.  Reports mild dizziness but denies fevers/chills, CP, SOB, palpitations, abdominal pain, diarrhea or constipation, urinary changes.      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, hospitalized x4mths w/ paralysis), decompensated alcoholic cirrhosis (now sober, off transplant list at [facility] as improved, w/ small esophgeal varices last EGD 9/19 w/ hx esophageal bleed s/p banding 2018 on propanolol, hx of encephalopathy on lactulose, neg HCC w/u as of 4/19, no ascites of 10/19), porcelain gallbladder, known R rectus sheath mass, liver lesion and R renal pole lesions of unclear etiology, SMA stenosis and recent hospitalizations in 11/2019 for severe RUQ pain 2/2 pancreatitis from cholelithiasis/choledocholithiasis s/p ERCP with stone extraction and f/u hospitalization with plastic stent placement w/o spontaneous dissolution and hematochezia 2/2 hemorrhoids, colonic AVMs (s/p cauterization) who p/w 1 melena and lower abdominal pain. Pt is A0x3, able to point to his pain and give nods to his medical history but is otherwise poor historian. Attempted to lynette wife Shannon x2, unable to reach. Per outpt GI chart note, wife had called reporting black tarry stools x24hrs, pale complexion and fatigue. Pt was rec'd to go the ED for consideration of colonoscopy. Pt reports tht his abdominal pain is lower in both lower quadrants, L>R, worsened by bowel movements, unchanged with meals though reports decreased PO intake. Also reports that he was having bright red bowel [id number]  and started having black tarry stools today.  Reports mild dizziness but denies fevers/chills, CP, SOB, palpitations, abdominal pain, diarrhea or constipation, urinary changes.      ED Course:   -112/60-68  HR 80-86  RR 18-20  02s 99%RA  T 98F  K 6.3 -> 10U humulin/D50 -->repeat K 5.6 --> 500cc NS bolus. Admitted to med for further eval. Continued to have black tarry BMs in ED.   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, hospitalized x4mths w/ paralysis), decompensated alcoholic cirrhosis (now sober, off transplant list at [facility] as improved, w/ small esophgeal varices last EGD 9/19 w/ hx esophageal bleed s/p banding 2018 on propanolol, hx of encephalopathy on lactulose, neg HCC w/u as of 4/19, no ascites of 10/19), porcelain gallbladder, known R rectus sheath mass, liver lesion and R renal pole lesions of unclear etiology, SMA stenosis and recent hospitalizations in 11/2019 for severe RUQ pain 2/2 pancreatitis from cholelithiasis/choledocholithiasis s/p ERCP with stone extraction and f/u hospitalization with plastic stent placement w/o spontaneous dissolution and hematochezia 2/2 hemorrhoids, colonic AVMs (s/p cauterization) who p/w 1 wk of melena and lower abdominal pain. Pt is A0x3, able to point to his pain and give nods to his medical history but is otherwise poor historian. Attempted to lynette wife Shannon x2, unable to reach. Per outpt GI chart note, wife had called reporting black tarry stools x24hrs, pale complexion and fatigue. Pt was rec'd to go the ED for consideration of endoscopy. Pt reports tht his abdominal pain is lower in both lower quadrants, L>R, worsened by bowel movements, unchanged with meals though reports decreased PO intake. Also reports that he was having bright red bowel [id number]  and started having black tarry stools today.  Reports mild dizziness but denies fevers/chills, CP, SOB, palpitations, abdominal pain, diarrhea or constipation, urinary changes.      ED Course:   -112/60-68  HR 80-86  RR 18-20  02s 99%RA  T 98F  K 6.3 -> 10U humulin/D50 -->repeat K 5.6 --> 500cc NS bolus. Admitted to med for further eval. Continued to have black tarry BMs in ED.   BP

## 2020-04-02 NOTE — H&P ADULT - PROBLEM SELECTOR PLAN 4
on propanolol for varices  BPs well controlled as inpt  monitor daily K 6.3 ->5.6, now s/p humulin/D50 and 500cc bolus  lokelma x1  now resumed on lactulose  appears to have stable CKD, unclear etiology of hyperkalemia   no EKG changes, asymptomatic

## 2020-04-02 NOTE — H&P ADULT - NSHPPHYSICALEXAM_GEN_ALL_CORE
PHYSICAL EXAM:  T(C): 36.1 (04-02-20 @ 03:03), Max: 37.4 (04-02-20 @ 02:18)  HR: 79 (04-02-20 @ 03:03) (79 - 86)  BP: 120/64 (04-02-20 @ 03:03) (100/60 - 120/64)  RR: 20 (04-02-20 @ 03:03) (18 - 20)  SpO2: 100% (04-02-20 @ 03:03) (99% - 100%)    GENERAL: pleasant, NAD, speaks in full sentences, no signs of respiratory distress  HEAD:  Atraumatic, Normocephalic  EYES: EOMI, PERRLA, conjunctiva and sclera clear  NECK: Supple, No JVD  CHEST/LUNG: Clear to auscultation bilaterally; No wheeze; No crackles; No accessory muscles used  HEART: Regular rate and rhythm; No murmurs;   ABDOMEN: Soft, mild TTP of lower abdomen, no palpable masses, +BS, no rebound or guarding, Nondistended; Bowel sounds present  EXTREMITIES:  2+ Peripheral Pulses, No cyanosis or edema  PSYCH: AAOx3  NEUROLOGY: non-focal  SKIN: No rashes or lesions

## 2020-04-02 NOTE — H&P ADULT - NSHPSOURCEINFOTX_GEN_ALL_CORE
Wife (Shannon): [phone];  Daughter Allie [phone] and Katalina ([phone])

## 2020-04-02 NOTE — PROGRESS NOTE ADULT - SUBJECTIVE AND OBJECTIVE BOX
Pre-Endoscopy Evaluation      Referring Physician:  dr. sal                                  Procedure:  upper gastrointestinal endoscopy     Indication for Procedure: gib    PAST MEDICAL & SURGICAL HISTORY:  Porcelain gallbladder  Liver cirrhosis  GIB  Esophageal Varices/banding  Guillain-Depue syndrome  HTN (hypertension)  H/O inguinal hernia repair      PMH of Gastroparesis [ ]  Gastric Surgery [ ]  Gastric Outlet Obstruction [ ]    Allergies:    No Known Allergies    Intolerances:    Latex allergy: [ ] yes [X] no    Medications:MEDICATIONS  (STANDING):  calcium carbonate 1250 mG  + Vitamin D (OsCal 500 + D) 1 Tablet(s) Oral daily  cefTRIAXone   IVPB 1000 milliGRAM(s) IV Intermittent every 24 hours  lactulose Syrup 15 Gram(s) Oral two times a day  octreotide  Infusion 50 MICROgram(s)/Hr (10 mL/Hr) IV Continuous <Continuous>  pantoprazole  Injectable 40 milliGRAM(s) IV Push two times a day  phytonadione   Solution 10 milliGRAM(s) Oral daily  propranolol 10 milliGRAM(s) Oral every 12 hours    MEDICATIONS  (PRN):  acetaminophen   Tablet .. 500 milliGRAM(s) Oral every 6 hours PRN Mild Pain (1 - 3), Moderate Pain (4 - 6), Severe Pain (7 - 10)      Smoking: [ ] yes  [X] no    AICD/PPM: [ ] yes   [X] no    Pertinent lab data:                        7.0    9.63  )-----------( 150      ( 02 Apr 2020 05:28 )             22.3     04-02    139  |  107  |  72<H>  ----------------------------<  148<H>  5.2   |  20<L>  |  1.50<H>    Ca    8.8      02 Apr 2020 05:28  Phos  2.2     04-02  Mg     1.7     04-02    TPro  5.8<L>  /  Alb  2.9<L>  /  TBili  0.4  /  DBili  x   /  AST  12  /  ALT  10  /  AlkPhos  69  04-02    PT/INR - ( 02 Apr 2020 05:28 )   PT: 18.6 sec;   INR: 1.59 ratio         PTT - ( 02 Apr 2020 05:28 )  PTT:29.3 sec          Physical Examination:  Daily Height in cm: 175.26 (01 Apr 2020 17:39)    Daily   Vital Signs Last 24 Hrs  T(C): 35.7 (02 Apr 2020 12:00), Max: 37.4 (02 Apr 2020 02:18)  T(F): 96.3 (02 Apr 2020 12:00), Max: 99.4 (02 Apr 2020 02:18)  HR: 65 (02 Apr 2020 12:00) (56 - 86)  BP: 95/54 (02 Apr 2020 12:00) (80/46 - 120/64)  BP(mean): --  RR: 20 (02 Apr 2020 12:00) (18 - 20)  SpO2: 98% (02 Apr 2020 12:00) (97% - 100%)    Drug Dosing Weight  Height (cm): 175.26 (01 Apr 2020 17:39)  Weight (kg): 87.1 (01 Apr 2020 17:39)  BMI (kg/m2): 28.4 (01 Apr 2020 17:39)  BSA (m2): 2.03 (01 Apr 2020 17:39)    Constitutional: NAD     Neck:  No JVD    Respiratory: CTAB/L    Cardiovascular: S1 and S2    Gastrointestinal: BS+, soft, NT/ND    Extremities: No peripheral edema    Neurological: A/O x 3    : No Garcia    Skin: No rashes    Comments:    The patient is a suitable candidate for the planned procedure unless box checked [ ]  No, explain:

## 2020-04-02 NOTE — H&P ADULT - ASSESSMENT
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

## 2020-04-02 NOTE — H&P ADULT - PROBLEM SELECTOR PLAN 7
Plan: Transitions of Care Status:  1.  Name of PCP: JITENDRA Mauricio ([facility])  2.  PCP Contacted on Admission: [ ] Y    [ ] N    3.  PCP contacted at Discharge: [ ] Y    [ ] N    [ ] N/A  4.  Post-Discharge Appointment Date and Location:  5.  Summary of Handoff given to PCP: Diet: NPO (pending possible procedure)  DVT ppx: SCDs for now given bleed  Dispo: pending

## 2020-04-02 NOTE — H&P ADULT - NSICDXPASTMEDICALHX_GEN_ALL_CORE_FT
PAST MEDICAL HISTORY:  Guillain-Hague syndrome     HTN (hypertension)     Liver cirrhosis     Porcelain gallbladder

## 2020-04-02 NOTE — H&P ADULT - PROBLEM SELECTOR PLAN 2
p/w bl lower abdominal pain  -no evidence of large stool burden, worsening biliary pathology on CT abd/pelvis  -rectus sheath mass appears stable  -pain control: conservative tylenol use (given cirrhosis), can also try oxycodone for breakthrough if uncontrolled  -GI consult in AM p/w bl lower abdominal pain  -no evidence of large stool burden, worsening biliary pathology on CT abd/pelvis  -rectus sheath mass appears stable  -possibly 2/2 persistent CBD stent?  -pain control: conservative tylenol use (given cirrhosis), can also try oxycodone for breakthrough if uncontrolled  -GI consult in AM

## 2020-04-02 NOTE — H&P ADULT - NSHPLABSRESULTS_GEN_ALL_CORE
Personally reviewed imaging, labs and EKG                          9.2    10.46 )-----------( 164      ( 01 Apr 2020 19:00 )             29.5       04-01    139  |  107  |  69<H>  ----------------------------<  191<H>  5.6<H>   |  20<L>  |  1.45<H>    Ca    9.0      01 Apr 2020 22:40    TPro  7.0  /  Alb  3.3  /  TBili  0.3  /  DBili  x   /  AST  16  /  ALT  13  /  AlkPhos  90  04-01                  PT/INR - ( 01 Apr 2020 19:00 )   PT: 16.9 sec;   INR: 1.45 ratio         PTT - ( 01 Apr 2020 19:00 )  PTT:27.9 sec    Cultures: NONE    Radiology:  < from: CT Abdomen and Pelvis w/ IV Cont (04.01.20 @ 20:11) >  LIVER: Cirrhosis.  BILE DUCTS: Normal caliber.  GALLBLADDER: Unchanged cholelithiasis and calcified gallbladder wall.  SPLEEN: Within normal limits.  PANCREAS: Pancreatic duct stent within the lesser pancreatic duct. The distal tip terminates in the duodenum.  ADRENALS: Within normal limits.  KIDNEYS/URETERS: Within normal limits.  BLADDER: Within normal limits.  REPRODUCTIVE ORGANS: Prostate within normal limits.  BOWEL: No bowel obstruction. Appendixis normal.  PERITONEUM: No ascites.  VESSELS: Atherosclerotic changes.  RETROPERITONEUM/LYMPH NODES: No lymphadenopathy.    ABDOMINAL WALL: A unchanged 2.1 x 3.9 cm mass in the right rectus sheath and right inguinal hernia repair plug.  BONES: Degenerative changes.    < from: Upper Endoscopy (09.11.19 @ 10:04) >  Impression:          - Small (< 5 mm) esophageal varices.                       - Normal stomach. Biopsied.                       - Granular, erythematous mucosa in the first part of the                        duodenum. Biopsied.                       - The examination was otherwise normal.    < from: Colonoscopy (10.31.19 @ 13:56) >  Impression:          - The examined portion of the ileum was normal.                       - A single non-bleeding colonic angioectasia. Treated                        with argon plasma coagulation (APC).                       - A single non-bleeding colonic angiodysplastic lesion.             Treated with argon plasma coagulation (APC).                       - One less than 5 mm polyp in the sigmoid colon, removed                        with a jumbo cold forceps. Resected and retrieved.                       - A single non-bleeding colonic angiodysplastic lesion.                        Treated with argon plasma coagulation (APC).                       - Rectal varices.                       - Internal hemorrhoids. Likely the source of bleeding.    < from: ERCP (11.05.19 @ 10:57) >  -The duodenal bulb and sweep contained multiple deep                        clean based ulcers.                       ERCP:                       -The  film was normal.                       -The duodenoscope was passed to the ampulla.               -The ampulla was normal appearing.                       -The PD was cannulated while trying to cannulate the                        bile duct. The PD was not injected.                       -Double wire technique failed to cannulate thebile duct                        and thus a 5Fr-4 cm single pigtail stent was placed in                        the PD.                       -Biliary cannulation was successful after PD stent                        placement using a Rx 39 preloaded sphincterotome.                       -Contrast was injected into the bile duct. I acquired                        and interpreted the fluoroscopic images. Images                        downloaded to PACS. There was a dilated CBD with a round                filling object.                       -A biliary sphincterotomy was performed.                       -A balloon catheter was used to remove the stone.                       -An occlusion cholangiogram showed no more stones           remained.  EKG:     HR 81 qtC 436, NSR with RBBB, unchanged from prior. no ST changes Personally reviewed imaging, labs and EKG                          9.2    10.46 )-----------( 164      ( 01 Apr 2020 19:00 )             29.5       04-01    139  |  107  |  69<H>  ----------------------------<  191<H>  5.6<H>   |  20<L>  |  1.45<H>    Ca    9.0      01 Apr 2020 22:40    TPro  7.0  /  Alb  3.3  /  TBili  0.3  /  DBili  x   /  AST  16  /  ALT  13  /  AlkPhos  90  04-01                  PT/INR - ( 01 Apr 2020 19:00 )   PT: 16.9 sec;   INR: 1.45 ratio         PTT - ( 01 Apr 2020 19:00 )  PTT:27.9 sec    Cultures: NONE    Radiology:  < from: CT Abdomen and Pelvis w/ IV Cont (04.01.20 @ 20:11) >  LIVER: Cirrhosis.  BILE DUCTS: Normal caliber.  GALLBLADDER: Unchanged cholelithiasis and calcified gallbladder wall.  SPLEEN: Within normal limits.  PANCREAS: Pancreatic duct stent within the lesser pancreatic duct. The distal tip terminates in the duodenum.  ADRENALS: Within normal limits.  KIDNEYS/URETERS: Within normal limits.  BLADDER: Within normal limits.  REPRODUCTIVE ORGANS: Prostate within normal limits.  BOWEL: No bowel obstruction. Appendixis normal.  PERITONEUM: No ascites.  VESSELS: Atherosclerotic changes.  RETROPERITONEUM/LYMPH NODES: No lymphadenopathy.    ABDOMINAL WALL: A unchanged 2.1 x 3.9 cm mass in the right rectus sheath and right inguinal hernia repair plug.  BONES: Degenerative changes.    < from: Upper Endoscopy (09.11.19 @ 10:04) >  Impression:          - Small (< 5 mm) esophageal varices.                       - Normal stomach. Biopsied.                       - Granular, erythematous mucosa in the first part of the                        duodenum. Biopsied.                       - The examination was otherwise normal.    < from: Colonoscopy (10.31.19 @ 13:56) >  Impression:          - The examined portion of the ileum was normal.                       - A single non-bleeding colonic angioectasia. Treated                        with argon plasma coagulation (APC).                       - A single non-bleeding colonic angiodysplastic lesion.             Treated with argon plasma coagulation (APC).                       - One less than 5 mm polyp in the sigmoid colon, removed                        with a jumbo cold forceps. Resected and retrieved.                       - A single non-bleeding colonic angiodysplastic lesion.                        Treated with argon plasma coagulation (APC).                       - Rectal varices.                       - Internal hemorrhoids. Likely the source of bleeding.    < from: ERCP (11.05.19 @ 10:57) >  -The duodenal bulb and sweep contained multiple deep                        clean based ulcers.                       ERCP:                       -The  film was normal.                       -The duodenoscope was passed to the ampulla.               -The ampulla was normal appearing.                       -The PD was cannulated while trying to cannulate the                        bile duct. The PD was not injected.                       -Double wire technique failed to cannulate thebile duct                        and thus a 5Fr-4 cm single pigtail stent was placed in                        the PD.                       -Biliary cannulation was successful after PD stent                        placement using a Rx 39 preloaded sphincterotome.                       -Contrast was injected into the bile duct. I acquired                        and interpreted the fluoroscopic images. Images                        downloaded to PACS. There was a dilated CBD with a round                filling object.                       -A biliary sphincterotomy was performed.                       -A balloon catheter was used to remove the stone.                       -An occlusion cholangiogram showed no more stones           remained.  EKG:     HR 81 qtC 436, NSR with RBBB, unchanged from prior.  no peaked T waves. no ST changes

## 2020-04-02 NOTE — H&P ADULT - NSHPREVIEWOFSYSTEMS_GEN_ALL_CORE
REVIEW OF SYSTEMS:    CONSTITUTIONAL: No weakness, fevers or chills  EYES/ENT: No visual changes;  No vertigo or throat pain   NECK: No pain or stiffness  RESPIRATORY: No cough, wheezing, hemoptysis; No shortness of breath  CARDIOVASCULAR: No chest pain or palpitations  GASTROINTESTINAL: +lower abdominal pain. No nausea, vomiting, or hematemesis; + BRBPR and small black tarry stools  GENITOURINARY: No dysuria, frequency or hematuria  NEUROLOGICAL: No numbness or weakness  SKIN: No itching, rashes  All other negative

## 2020-04-02 NOTE — PROVIDER CONTACT NOTE (OTHER) - SITUATION
Patient needed to use bathroom, 2 person assist to bathroom was attempted, pt c/o dizziness and was put back into bed. BP=88/56 at the time.

## 2020-04-02 NOTE — H&P ADULT - PROBLEM SELECTOR PLAN 5
Cr appears to have worsened over the past year, unclear if new baseline (1.4-2.0)  no e/o KAY  monitor Cr daily  avoid nephrotoxic agents  renally dose meds on propanolol for varices  BPs well controlled as inpt  monitor daily

## 2020-04-02 NOTE — CONSULT NOTE ADULT - SUBJECTIVE AND OBJECTIVE BOX
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, hospitalized x4mths w/ paralysis), decompensated alcoholic cirrhosis (now sober, off transplant list at [facility] as improved, with small esophgeal varices last EGD 9/19 with hx esophageal bleed s/p banding 2018 on propanolol, hx of encephalopathy on lactulose, neg HCC w/u as of 4/19, no ascites of 10/19), porcelain gallbladder, known R rectus sheath mass, liver lesion and R renal pole lesions of unclear etiology, SMA stenosis and recent hospitalizations in 11/2019 for severe RUQ pain 2/2 pancreatitis from cholelithiasis/choledocholithiasis s/p ERCP with stone extraction and f/u hospitalization with plastic stent placement w/o spontaneous dissolution and hematochezia 2/2 hemorrhoids, colonic AVMs (s/p cauterization) who presented with melena and lower abdominal pain for one week.     As per notes, patient has complained of black tarry stools x24hrs, pale complexion and fatigue. He has abdominal pain in b/l lower quadrants, L>R, worsened by bowel movements, unchanged with meals though reports decreased PO intake. Also reports that he was having bright red bowel [id number]  and started having black tarry stools today.  Reports mild dizziness but denies fevers/chills, CP, SOB, palpitations, abdominal pain, diarrhea or constipation, urinary changes.      ED Course:   -112/60-68  HR 80-86  RR 18-20  02s 99%RA  T 98F  K 6.3 -> 10U humulin/D50 -->repeat K 5.6 --> 500cc NS bolus. Admitted to med for further eval. Continued to have black tarry BMs in ED.       Allergies:  No Known Allergies      Home Medications:  * Patient Currently Takes Medications as of 02-Apr-2020 01:47 documented in Structured Notes  · 	lactulose 10 g/15 mL oral syrup: Last Dose Taken:  , 22.5 milliliter(s) orally 2 times a day  · 	propranolol 10 mg oral tablet: Last Dose Taken:  , 1 tab(s) orally 2 times a day  · 	Calcium 500+D: Last Dose Taken:    · 	Vitamin D3: Last Dose Taken:      Hospital Medications:  acetaminophen   Tablet .. 500 milliGRAM(s) Oral every 6 hours PRN  calcium carbonate 1250 mG  + Vitamin D (OsCal 500 + D) 1 Tablet(s) Oral daily  cefTRIAXone   IVPB 1000 milliGRAM(s) IV Intermittent every 24 hours  lactulose Syrup 15 Gram(s) Oral two times a day  octreotide  Infusion 50 MICROgram(s)/Hr IV Continuous <Continuous>  pantoprazole  Injectable 40 milliGRAM(s) IV Push two times a day  phytonadione   Solution 10 milliGRAM(s) Oral daily  propranolol 10 milliGRAM(s) Oral every 12 hours      PMHX/PSHX:  Porcelain gallbladder  Liver cirrhosis  Guillain-[place] syndrome  HTN (hypertension)  H/O inguinal hernia repair      Family history:  Family history of ovarian cancer (Sibling)      Social History:   lives with wife and daughters  former   ambulates with walker  relies on family for assistance with ADLs, able to feed, bathe on own  last ETOH use was 5/2018  denies hx tobacco or rec drug use    ROS:     General:  No wt loss, fevers, chills, night sweats, fatigue,   Eyes:  Good vision, no reported pain  ENT:  No sore throat, pain, runny nose, dysphagia  CV:  No pain, palpitations, hypo/hypertension  Resp:  No dyspnea, cough, tachypnea, wheezing  GI:  See HPI  :  No pain, bleeding, incontinence, nocturia  Muscle:  No pain, weakness  Neuro:  No weakness, tingling, memory problems  Psych:  No fatigue, insomnia, mood problems, depression  Endocrine:  No polyuria, polydipsia, cold/heat intolerance  Heme:  No petechiae, ecchymosis, easy bruisability  Skin:  No rash, edema      PHYSICAL EXAM:     GENERAL:  Appears stated age  HEENT:  NC/AT  CHEST:  Full & symmetric excursion  HEART:  Regular rhythm, S1, S2  ABDOMEN:  Soft, non-tender  EXTREMITIES:  no edema  SKIN:  No rash  NEURO:  Alert, oriented    Vital Signs:  Vital Signs Last 24 Hrs  T(C): 36.6 (02 Apr 2020 09:00), Max: 37.4 (02 Apr 2020 02:18)  T(F): 97.9 (02 Apr 2020 09:00), Max: 99.4 (02 Apr 2020 02:18)  HR: 59 (02 Apr 2020 09:52) (56 - 86)  BP: 99/59 (02 Apr 2020 09:52) (80/46 - 120/64)  BP(mean): --  RR: 20 (02 Apr 2020 09:00) (18 - 20)  SpO2: 97% (02 Apr 2020 09:00) (97% - 100%)  Daily Height in cm: 175.26 (01 Apr 2020 17:39)    Daily     LABS:                        7.0    9.63  )-----------( 150      ( 02 Apr 2020 05:28 )             22.3     04-02    139  |  107  |  72<H>  ----------------------------<  148<H>  5.2   |  20<L>  |  1.50<H>    Ca    8.8      02 Apr 2020 05:28  Phos  2.2     04-02  Mg     1.7     04-02    TPro  5.8<L>  /  Alb  2.9<L>  /  TBili  0.4  /  DBili  x   /  AST  12  /  ALT  10  /  AlkPhos  69  04-02    LIVER FUNCTIONS - ( 02 Apr 2020 05:28 )  Alb: 2.9 g/dL / Pro: 5.8 g/dL / ALK PHOS: 69 U/L / ALT: 10 U/L / AST: 12 U/L / GGT: x           PT/INR - ( 02 Apr 2020 05:28 )   PT: 18.6 sec;   INR: 1.59 ratio         PTT - ( 02 Apr 2020 05:28 )  PTT:29.3 sec        Imaging:    < from: ERCP (11.05.19 @ 10:57) >  Procedure:           ERCP  Indications:         79 year old male with a CBD stone.  Providers:           AUGUSTINA GARRETT MD, ABDIAS NORRIS (Fellow)  Medicines:           General Anesthesia                       Indomethacin 100 mg AZ                       IVF                       Cipro 400 mg IV                       Fluoro: 2.9 min/21.7 mGy/83 kV  Complications:       No immediate complications.  Procedure:           Pre-Anesthesia Assessment:                       - Prior to the procedure, a History and Physical was                        performed, and patient medications, allergies and                        sensitivities were reviewed. The patient's tolerance of                        previous anesthesia was reviewed.                       - Therisks (infection, bleeding, perforation,                        anesthesia related complication, pancreatitis if                        appropriate, missed lesions etc), benefits, alternatives                        explained to the patient and family. The patient agreed                        to the procedure.                       After obtaining informed consent, the scope was passed                        under direct vision. Throughout the procedure, the                        patient's blood pressure, pulse, and oxygen saturations                        were monitored continuously. The ERCP was introduced                        through the mouth, and advanced to the duodenum and used                        to inject contrast into the bile duct and ventral                        pancreatic duct. The Endoscope was introduced through                        the and advanced to the duodenum. The was introduced                        through the and advanced to the duodenum. The ERCP was                        accomplished with ease. The patient tolerated the                        procedure well.                                                                                   Findings:       EGD:       -The esophagus was normal.    -The stomach was normal.       -The duodenal bulb and sweep contained multiple deep clean based ulcers.       ERCP:       -The  film was normal.       -The duodenoscope was passed to the ampulla.       -The ampulla was normal appearing.       -The PD was cannulated while trying to cannulate the bile duct. The PD        was not injected.       -Double wire technique failed to cannulate the bile duct and thus a        5Fr-4 cm single pigtail stent was placed in the PD.       -Biliary cannulation was successful after PD stent placement using a Rx        39 preloaded sphincterotome.       -Contrast was injected into the bile duct. I acquired and interpreted        the fluoroscopic images. Images downloaded to PACS. There was a dilated     CBD with a round filling object.       -A biliary sphincterotomy was performed.       -A balloon catheter was used to remove the stone.       -An occlusion cholangiogram showed no more stones remained.       -There was excellent flow of bile and contrast from the bile duct.                                                                                   Impression:          EGD:                       -The esophagus was normal.                       -The stomach was normal.   -The duodenal bulb and sweep contained multiple deep                        clean based ulcers.                       ERCP:                       -The  film was normal.                       -The duodenoscope was passed to the ampulla.               -The ampulla was normal appearing.                       -The PD was cannulated while trying to cannulate the                        bile duct. The PD was not injected.                       -Double wire technique failed to cannulate thebile duct                        and thus a 5Fr-4 cm single pigtail stent was placed in                        the PD.                       -Biliary cannulation was successful after PD stent                        placement using a Rx 39 preloaded sphincterotome.                       -Contrast was injected into the bile duct. I acquired                        and interpreted the fluoroscopic images. Images                        downloaded to PACS. There was a dilated CBD with a round                filling object.                       -A biliary sphincterotomy was performed.                       -A balloon catheter was used to remove the stone.                       -An occlusion cholangiogram showed no more stones           remained.                       -There was excellent flow of bile and contrast from the                        bile duct.  Recommendation:      - [facility].                       - NPO     - IVF                       - Check serum or stool H. pylori. If positive please                        treat.                       - AXR in 4 weeks to see if the PD stent is still present.    < end of copied text >

## 2020-04-02 NOTE — CHART NOTE - NSCHARTNOTEFT_GEN_A_CORE
Hepatology follow up    Patient underwent an EGD  Full report to follow in [place]    Impression:   - small esophageal varices  - 2 large duodenal ulcers, one with a pigmented spot, s/p cauterisation, likely the source of bleeding    Recommendation:  - please discontinue octreotide infusion  - continue PPI infusion at 8mg/hr  - clear liquid diet today      Abbey Cyr, PGY-6  GI fellow  B- [id number]/ [phone]  Please call GI fellow on call after 5pm and on weekends

## 2020-04-02 NOTE — H&P ADULT - PROBLEM SELECTOR PLAN 8
Plan: Transitions of Care Status:  1.  Name of PCP: JITENDRA Mauricio ([place])  2.  PCP Contacted on Admission: [ ] Y    [ ] N    3.  PCP contacted at Discharge: [ ] Y    [ ] N    [ ] N/A  4.  Post-Discharge Appointment Date and Location:  5.  Summary of Handoff given to PCP:

## 2020-04-02 NOTE — H&P ADULT - NSHPSOCIALHISTORY_GEN_ALL_CORE
lives with wife and daughters  former   ambulates with walker  relies on family for assistance with ADLs, able to feed, bathe on own  last ETOH use was 5/2018  denies hx tobacco or rec drug use

## 2020-04-02 NOTE — CONSULT NOTE ADULT - ASSESSMENT
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, hospitalized x4mths w/ paralysis), decompensated alcoholic cirrhosis (now sober, [facility], with small esophgeal varices last EGD 9/19 with hx esophageal bleed s/p banding 2018 on propanolol, hx of encephalopathy on lactulose, neg HCC w/u as of 4/19, no ascites of 10/19), porcelain gallbladder, known R rectus sheath mass, liver lesion and R renal pole lesions of unclear etiology, SMA stenosis and recent hospitalizations in 11/2019 for severe RUQ pain 2/2 pancreatitis from cholelithiasis/choledocholithiasis s/p ERCP with stone extraction and f/u hospitalization with plastic stent placement w/o spontaneous dissolution and hematochezia 2/2 hemorrhoids, colonic AVMs (s/p cauterization) who presented with melena and lower abdominal pain for one week. 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, hospitalized x 4mths w/ paralysis), decompensated alcoholic cirrhosis (now sober, off transplant list at [facility] as improved, with small esophgeal varices last EGD 9/19 with hx esophageal bleed s/p banding 2018 on propanolol, hx of encephalopathy on lactulose, neg HCC w/u as of 4/19, no ascites of 10/19), porcelain gallbladder, known R rectus sheath mass, liver lesion and R renal pole lesions of unclear etiology, SMA stenosis and recent hospitalizations in 11/2019 for severe RUQ pain 2/2 pancreatitis from cholelithiasis/choledocholithiasis s/p ERCP with stone extraction and f/u hospitalization with plastic stent placement w/o spontaneous dissolution and hematochezia 2/2 hemorrhoids, colonic AVMs (s/p cauterization) who presented with melena and lower abdominal pain for one week.     IMPRESSION  - Acute blood loss anemia with melena, Ddx: esophagitis, varices, PUD, erosive gastritis, angioectasia    RECOMMENDATION    - trend CBC, CMP, INR, transfuse as needed  - start pantoprazole with bolus 80mg IV followed by infusion at 8mg/hr  - start octreotide and Ceftriaxone IV  - plan for upper endoscopy TODAY  - please keep NPO  - supportive care as per primary team

## 2020-04-02 NOTE — RAPID RESPONSE TEAM SUMMARY - NSOTHERINTERVENTIONSRRT_GEN_ALL_CORE
Stat CBC, coags    Will determine need for pRBC (for symptomatic anemia) based on CBC. IVF bolus for now. GI consult in AM for consideration of repeat EGD.

## 2020-04-02 NOTE — H&P ADULT - PROBLEM SELECTOR PLAN 6
Diet: NPO (pending possible procedure)  DVT ppx: SCDs for now given bleed  Dispo: pending Cr appears to have worsened over the past year, unclear if new baseline (1.4-2.0)  no e/o KAY  monitor Cr daily  avoid nephrotoxic agents  renally dose meds

## 2020-04-02 NOTE — RAPID RESPONSE TEAM SUMMARY - NSSITUATIONBACKGROUNDRRT_GEN_ALL_CORE
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    RRT called for hypotension to BP 78/51   upon standing up from toilet with dizziness. Also noted to have small amt of coffee ground emesis.

## 2020-04-02 NOTE — H&P ADULT - ATTENDING COMMENTS
Pt seen and examined, agree with Dr. Taylor's note as above with updates as follows. Patient noted with severe orthostatic hypotension this AM and RRT called. NS bolus ordered and stat repeat CBC sent showing drop in HgB from 9.5->7.0. Pt given protonix 80mg iv x 1 stat and octreotide 50mcg iv x 1 stat. Octreotide infusion to be started and protonix to be continued at 40mg IV bid with 40mg dose to be given later this AM. GI consulted via e-mail for possible endoscopy. 1 unit PRBCs ordered and pending at this time. Case discussed with resident physician Dr. Taylor.

## 2020-04-02 NOTE — CONSULT NOTE ADULT - ATTENDING COMMENTS
Hepatology Staff: Vera Bobo MD    I saw and examined the patient along with  Dr. Cyr 04-02-20.    Patient Medical Record, hospital course was reviewed and summarized as below:    Vitals: Vital Signs Last 24 Hrs  T(C): 35.7 (02 Apr 2020 12:00), Max: 37.4 (02 Apr 2020 02:18)  T(F): 96.3 (02 Apr 2020 12:00), Max: 99.4 (02 Apr 2020 02:18)  HR: 65 (02 Apr 2020 12:00) (56 - 86)  BP: 95/54 (02 Apr 2020 12:00) (80/46 - 120/64)  BP(mean): --  RR: 20 (02 Apr 2020 12:00) (18 - 20)  SpO2: 98% (02 Apr 2020 12:00) (97% - 100%)  Medications:  IV Fluids:   Antibiotics:cefTRIAXone   IVPB 1000 milliGRAM(s) IV Intermittent every 24 hours      Labs:Creatinine, Serum: 1.50 mg/dL (04-02-20 @ 05:28)  Bilirubin Total, Serum: 0.4 mg/dL (04-02-20 @ 05:28)  INR: 1.59 ratio (04-02-20 @ 05:28)  Creatinine, Serum: 1.45 mg/dL (04-01-20 @ 22:40)  Creatinine, Serum: 1.60 mg/dL (04-01-20 @ 19:00)  Bilirubin Total, Serum: 0.3 mg/dL (04-01-20 @ 19:00)  INR: 1.45 ratio (04-01-20 @ 19:00)    MELD Score:   Imaging Studies:  I/O: I&O's Summary    02 Apr 2020 07:01  -  02 Apr 2020 15:14  --------------------------------------------------------  IN: 0 mL / OUT: 150 mL / NET: -150 mL      Nutritional Status: Weight (kg): 87.1 (04-01-20 @ 17:39)  BMI (kg/m2): 28.4 (04-01-20 @ 17:39)  Albumin, Serum: 2.9 g/dL (04-02-20 @ 05:28)  Albumin, Serum: 3.3 g/dL (04-01-20 @ 19:00)    Recommendations: upper endoscopy was performed this afternoon which revealed two ulcers in the duodenum with dark spot in on of them. This was treated with APC. no active bleeding was noted. Please discontinue IV octreotide and continue with IV PPI drip. Okay to resume clear liquid.    Plan discussed with Primary team.

## 2020-04-03 ENCOUNTER — TRANSCRIPTION ENCOUNTER (OUTPATIENT)
Age: 80
End: 2020-04-03

## 2020-04-03 LAB
ALBUMIN SERPL ELPH-MCNC: 2.7 G/DL — LOW (ref 3.3–5)
ALP SERPL-CCNC: 76 U/L — SIGNIFICANT CHANGE UP (ref 40–120)
ALT FLD-CCNC: 17 U/L — SIGNIFICANT CHANGE UP (ref 10–45)
ANION GAP SERPL CALC-SCNC: 15 MMOL/L — SIGNIFICANT CHANGE UP (ref 5–17)
AST SERPL-CCNC: 30 U/L — SIGNIFICANT CHANGE UP (ref 10–40)
BILIRUB SERPL-MCNC: 0.6 MG/DL — SIGNIFICANT CHANGE UP (ref 0.2–1.2)
BUN SERPL-MCNC: 49 MG/DL — HIGH (ref 7–23)
CALCIUM SERPL-MCNC: 8.5 MG/DL — SIGNIFICANT CHANGE UP (ref 8.4–10.5)
CHLORIDE SERPL-SCNC: 109 MMOL/L — HIGH (ref 96–108)
CO2 SERPL-SCNC: 14 MMOL/L — LOW (ref 22–31)
CREAT SERPL-MCNC: 1.39 MG/DL — HIGH (ref 0.5–1.3)
GLUCOSE BLDC GLUCOMTR-MCNC: 145 MG/DL — HIGH (ref 70–99)
GLUCOSE SERPL-MCNC: 133 MG/DL — HIGH (ref 70–99)
HCT VFR BLD CALC: 16.3 % — CRITICAL LOW (ref 39–50)
HCT VFR BLD CALC: 31.6 % — LOW (ref 39–50)
HGB BLD-MCNC: 5 G/DL — CRITICAL LOW (ref 13–17)
HGB BLD-MCNC: 9.9 G/DL — LOW (ref 13–17)
MAGNESIUM SERPL-MCNC: 1.7 MG/DL — SIGNIFICANT CHANGE UP (ref 1.6–2.6)
MCHC RBC-ENTMCNC: 26.7 PG — LOW (ref 27–34)
MCHC RBC-ENTMCNC: 27.2 PG — SIGNIFICANT CHANGE UP (ref 27–34)
MCHC RBC-ENTMCNC: 30.7 GM/DL — LOW (ref 32–36)
MCHC RBC-ENTMCNC: 31.3 GM/DL — LOW (ref 32–36)
MCV RBC AUTO: 86.8 FL — SIGNIFICANT CHANGE UP (ref 80–100)
MCV RBC AUTO: 87.2 FL — SIGNIFICANT CHANGE UP (ref 80–100)
NRBC # BLD: 0 /100 WBCS — SIGNIFICANT CHANGE UP (ref 0–0)
NRBC # BLD: 0 /100 WBCS — SIGNIFICANT CHANGE UP (ref 0–0)
PHOSPHATE SERPL-MCNC: 2.3 MG/DL — LOW (ref 2.5–4.5)
PLATELET # BLD AUTO: 124 K/UL — LOW (ref 150–400)
PLATELET # BLD AUTO: 91 K/UL — LOW (ref 150–400)
POTASSIUM SERPL-MCNC: 5.1 MMOL/L — SIGNIFICANT CHANGE UP (ref 3.5–5.3)
POTASSIUM SERPL-SCNC: 5.1 MMOL/L — SIGNIFICANT CHANGE UP (ref 3.5–5.3)
PROT SERPL-MCNC: 6 G/DL — SIGNIFICANT CHANGE UP (ref 6–8.3)
RBC # BLD: 1.87 M/UL — LOW (ref 4.2–5.8)
RBC # BLD: 3.64 M/UL — LOW (ref 4.2–5.8)
RBC # FLD: 15.9 % — HIGH (ref 10.3–14.5)
RBC # FLD: 16.8 % — HIGH (ref 10.3–14.5)
SODIUM SERPL-SCNC: 138 MMOL/L — SIGNIFICANT CHANGE UP (ref 135–145)
SURGICAL PATHOLOGY STUDY: SIGNIFICANT CHANGE UP
WBC # BLD: 6.48 K/UL — SIGNIFICANT CHANGE UP (ref 3.8–10.5)
WBC # BLD: 8.24 K/UL — SIGNIFICANT CHANGE UP (ref 3.8–10.5)
WBC # FLD AUTO: 6.48 K/UL — SIGNIFICANT CHANGE UP (ref 3.8–10.5)
WBC # FLD AUTO: 8.24 K/UL — SIGNIFICANT CHANGE UP (ref 3.8–10.5)

## 2020-04-03 PROCEDURE — 99232 SBSQ HOSP IP/OBS MODERATE 35: CPT

## 2020-04-03 RX ORDER — PANTOPRAZOLE SODIUM 20 MG/1
40 TABLET, DELAYED RELEASE ORAL EVERY 12 HOURS
Refills: 0 | Status: DISCONTINUED | OUTPATIENT
Start: 2020-04-03 | End: 2020-04-03

## 2020-04-03 RX ORDER — PANTOPRAZOLE SODIUM 20 MG/1
40 TABLET, DELAYED RELEASE ORAL
Refills: 0 | Status: DISCONTINUED | OUTPATIENT
Start: 2020-04-03 | End: 2020-04-06

## 2020-04-03 RX ORDER — LACTULOSE 10 G/15ML
15 SOLUTION ORAL
Refills: 0 | Status: DISCONTINUED | OUTPATIENT
Start: 2020-04-03 | End: 2020-04-06

## 2020-04-03 RX ORDER — PANTOPRAZOLE SODIUM 20 MG/1
8 TABLET, DELAYED RELEASE ORAL
Qty: 80 | Refills: 0 | Status: DISCONTINUED | OUTPATIENT
Start: 2020-04-03 | End: 2020-04-03

## 2020-04-03 RX ORDER — SUCRALFATE 1 G
1 TABLET ORAL
Refills: 0 | Status: DISCONTINUED | OUTPATIENT
Start: 2020-04-03 | End: 2020-04-06

## 2020-04-03 RX ADMIN — Medication 1 TABLET(S): at 18:38

## 2020-04-03 RX ADMIN — Medication 10 MILLIGRAM(S): at 14:00

## 2020-04-03 RX ADMIN — CEFTRIAXONE 100 MILLIGRAM(S): 500 INJECTION, POWDER, FOR SOLUTION INTRAMUSCULAR; INTRAVENOUS at 04:29

## 2020-04-03 NOTE — CHART NOTE - NSCHARTNOTEFT_GEN_A_CORE
Hepatology follow up    Patient with no more episodes of melena today  Hb was 5.0 in AM, now is s/p 2u pRBC and repeat Hb is 10.   The AM lab was likely a lab error  Currently, patient is HD stable, no more bleeding, Hb is stable    Recommendation:   - No plan for repeat endoscopic intervention today.   - Can advance diet as tolerated  - if no other C/I, can discharge patient  - patient can follow with Hepatology as outpatient on discharge (#[phone], [phone])    Please call us back as needed

## 2020-04-03 NOTE — PROGRESS NOTE ADULT - SUBJECTIVE AND OBJECTIVE BOX
Patient is a 80y old  Male who presents with a chief complaint of abdominal pain (03 Apr 2020 13:54)      SUBJECTIVE / OVERNIGHT EVENTS: feels better.  Review of Systems  chest pain no  palpitations no  sob no  nausea no  headache no    MEDICATIONS  (STANDING):  calcium carbonate 1250 mG  + Vitamin D (OsCal 500 + D) 1 Tablet(s) Oral daily  cefTRIAXone   IVPB 1000 milliGRAM(s) IV Intermittent every 24 hours  pantoprazole Infusion 8 mG/Hr (10 mL/Hr) IV Continuous <Continuous>  phytonadione   Solution 10 milliGRAM(s) Oral daily  propranolol 10 milliGRAM(s) Oral every 12 hours  sodium chloride 0.9%. 1000 milliLiter(s) (100 mL/Hr) IV Continuous <Continuous>    MEDICATIONS  (PRN):  acetaminophen   Tablet .. 500 milliGRAM(s) Oral every 6 hours PRN Mild Pain (1 - 3), Moderate Pain (4 - 6), Severe Pain (7 - 10)      Vital Signs Last 24 Hrs  T(C): 37.1 (03 Apr 2020 18:00), Max: 37.1 (03 Apr 2020 18:00)  T(F): 98.8 (03 Apr 2020 18:00), Max: 98.8 (03 Apr 2020 18:00)  HR: 67 (03 Apr 2020 18:00) (54 - 75)  BP: 111/76 (03 Apr 2020 18:00) (109/63 - 130/81)  BP(mean): --  RR: 18 (03 Apr 2020 18:00) (18 - 18)  SpO2: 98% (03 Apr 2020 18:00) (93% - 100%)    PHYSICAL EXAM:  GENERAL: NAD, well-developed  HEAD:  Atraumatic, Normocephalic  EYES: EOMI, PERRLA, conjunctiva and sclera clear  NECK: Supple, No JVD  CHEST/LUNG: Clear to auscultation bilaterally; No wheeze  HEART: Regular rate and rhythm; No murmurs, rubs, or gallops  ABDOMEN: Soft, Nontender, Nondistended; Bowel sounds present  EXTREMITIES:  2+ Peripheral Pulses, No clubbing, cyanosis, or edema  PSYCH: AAOx3  NEUROLOGY: non-focal  SKIN: No rashes or lesions    LABS:                        9.9    8.24  )-----------( 124      ( 03 Apr 2020 13:57 )             31.6     04-03    138  |  109<H>  |  49<H>  ----------------------------<  133<H>  5.1   |  14<L>  |  1.39<H>    Ca    8.5      03 Apr 2020 13:57  Phos  2.3     04-03  Mg     1.7     04-03    TPro  6.0  /  Alb  2.7<L>  /  TBili  0.6  /  DBili  x   /  AST  30  /  ALT  17  /  AlkPhos  76  04-03    PT/INR - ( 02 Apr 2020 05:28 )   PT: 18.6 sec;   INR: 1.59 ratio         PTT - ( 02 Apr 2020 05:28 )  PTT:29.3 sec            RADIOLOGY & ADDITIONAL TESTS:    Imaging Personally Reviewed:    Consultant(s) Notes Reviewed:      Care Discussed with Consultants/Other Providers:

## 2020-04-03 NOTE — DISCHARGE NOTE PROVIDER - CARE PROVIDERS DIRECT ADDRESSES
,[email or web address],DirectAddress_Unknown

## 2020-04-03 NOTE — DISCHARGE NOTE PROVIDER - PROVIDER TOKENS
PROVIDER:[TOKEN:[[id number]]] PROVIDER:[TOKEN:[[id number]]],FREE:[LAST:[Hepatologist],PHONE:[(   )    -],FAX:[(   )    -],FOLLOWUP:[1 week]]

## 2020-04-03 NOTE — PROGRESS NOTE ADULT - ASSESSMENT
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, hospitalized x 4mths w/ paralysis), decompensated alcoholic cirrhosis (now sober, off transplant list at [facility] as improved, with small esophogeal varices last EGD 9/19 with hx esophageal bleed s/p banding 2018 on propanolol, hx of encephalopathy on lactulose, neg HCC w/u as of 4/19, no ascites of 10/19), porcelain gallbladder, known R rectus sheath mass, liver lesion and R renal pole lesions of unclear etiology, SMA stenosis and recent hospitalizations in 11/2019 for severe RUQ pain 2/2 pancreatitis from cholelithiasis/choledocholithiasis s/p ERCP with stone extraction and f/u hospitalization with plastic stent placement w/o spontaneous dissolution and hematochezia 2/2 hemorrhoids, colonic AVMs (s/p cauterization) who presented with melena and lower abdominal pain for one week.     IMPRESSION  - Acute blood loss anemia with melena, s/p EGD 4/2/2020 with -grade I esophageal varices and multiple non-bleeding duodenal ulcers with pigmented material treated with argon plasma coagulation (APC)    - Decompensated alcoholic cirrhosis       Varices: small, hx esophageal bleed s/p banding 2018 on propanolol,        Encephalopathy: previously reported, on lactulose       HCC: negative workup CT 4/1/2020       Ascites: none CT 4/1/2020    RECOMMENDATION    - trend CBC, CMP, INR, transfuse as needed  - continue pantoprazole 40mg IV BID  - clear liquid diet today   - pending biopsy results   - supportive care as per primary team    Abbey Cyr, PGY-6  GI fellow  B- [id number]/ [phone]  Please call GI fellow on call after 5pm and on weekends 79 year old male with HTN, CKD3, GERD with dyspepsia/ Hpylori txt'd 10/2019, 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, hospitalized x 4mths w/ paralysis), decompensated alcoholic cirrhosis (now sober, off transplant list at [facility] as improved, with small esophogeal varices last EGD 9/19 with hx esophageal bleed s/p banding 2018 on propanolol, hx of encephalopathy on lactulose, neg HCC w/u as of 4/19, no ascites of 10/19), porcelain gallbladder, known R rectus sheath mass, liver lesion and R renal pole lesions of unclear etiology, SMA stenosis and recent hospitalizations in 11/2019 for severe RUQ pain 2/2 pancreatitis from cholelithiasis/choledocholithiasis s/p ERCP with stone extraction and f/u hospitalization with plastic stent placement w/o spontaneous dissolution and hematochezia 2/2 hemorrhoids, colonic AVMs (s/p cauterization) who presented with melena and lower abdominal pain for one week.     IMPRESSION  - Acute blood loss anemia with melena, s/p EGD 4/2/2020 with -grade I esophageal varices and multiple non-bleeding duodenal ulcers with pigmented material treated with argon plasma coagulation (APC), now with drop in Hb again but no more episodes of melena overnight     - Decompensated alcoholic cirrhosis       Varices: small, hx esophageal bleed s/p banding 2018 on propanolol,        Encephalopathy: previously reported, on lactulose       HCC: negative workup CT 4/1/2020       Ascites: none CT 4/1/2020    RECOMMENDATION    - pending repeat Hb  - continue pantoprazole 40mg IV BID  - clear liquid diet today   - pending biopsy results   - supportive care as per primary team    Abbey Cyr, PGY-6  GI fellow  B- [id number]/ [phone]  Please call GI fellow on call after 5pm and on weekends

## 2020-04-03 NOTE — DISCHARGE NOTE PROVIDER - HOSPITAL COURSE
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, hospitalized x4mths w/ paralysis), decompensated alcoholic cirrhosis (now sober, off transplant list at [facility] as improved, w/ small esophgeal varices last EGD 9/19 w/ hx esophageal bleed s/p banding 2018 on propanolol, hx of encephalopathy on lactulose, neg HCC w/u as of 4/19, no ascites of 10/19), porcelain gallbladder, known R rectus sheath mass, liver lesion and R renal pole lesions of unclear etiology, SMA stenosis and recent hospitalizations in 11/2019 for severe RUQ pain 2/2 pancreatitis from cholelithiasis/choledocholithiasis s/p ERCP with stone extraction and f/u hospitalization with plastic stent placement w/o spontaneous dissolution and hematochezia 2/2 hemorrhoids, colonic AVMs (s/p cauterization) who p/w 1 wk of melena and lower abdominal pain. Pt is A0x3, able to point to his pain and give nods to his medical history but is otherwise poor historian. Attempted to lynette wife Shannon x2, unable to reach. Per outpt GI chart note, wife had called reporting black tarry stools x24hrs, pale complexion and fatigue. Pt was rec'd to go the ED for consideration of endoscopy. Pt reports tht his abdominal pain is lower in both lower quadrants, L>R, worsened by bowel movements, unchanged with meals though reports decreased PO intake. Also reports that he was having bright red bowel [id number]  and started having black tarry stools today.  Reports mild dizziness but denies fevers/chills, CP, SOB, palpitations, abdominal pain, diarrhea or constipation, urinary changes.      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, hospitalized x4mths w/ paralysis), decompensated alcoholic cirrhosis (now sober, off transplant list at [facility] as improved, w/ small esophgeal varices last EGD 9/19 w/ hx esophageal bleed s/p banding 2018 on propanolol, hx of encephalopathy on lactulose, neg HCC w/u as of 4/19, no ascites of 10/19), porcelain gallbladder, known R rectus sheath mass, liver lesion and R renal pole lesions of unclear etiology, SMA stenosis and recent hospitalizations in 11/2019 for severe RUQ pain 2/2 pancreatitis from cholelithiasis/choledocholithiasis s/p ERCP with stone extraction and f/u hospitalization with plastic stent placement w/o spontaneous dissolution and hematochezia 2/2 hemorrhoids, colonic AVMs (s/p cauterization) who p/w 1 wk of melena and lower abdominal pain. Pt is A0x3, able to point to his pain and give nods to his medical history but is otherwise poor historian. Attempted to lynette wife Shannon x2, unable to reach. Per outpt GI chart note, wife had called reporting black tarry stools x24hrs, pale complexion and fatigue. Pt was rec'd to go the ED for consideration of endoscopy. Pt reports tht his abdominal pain is lower in both lower quadrants, L>R, worsened by bowel movements, unchanged with meals though reports decreased PO intake. Also reports that he was having bright red bowel [id number]  and started having black tarry stools today.  Reports mild dizziness but denies fevers/chills, CP, SOB, palpitations, abdominal pain, diarrhea or constipation, urinary changes.      4/2: s/p EGD -grade I esophageal varices and multiple non-bleeding duodenal ulcers with pigmented material treated with argon plasma coagulation (APC), s/p 2 units prbc,  good response hgb 9.9. 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, hospitalized x4mths w/ paralysis), decompensated alcoholic cirrhosis (now sober, off transplant list at [facility] as improved, w/ small esophgeal varices last EGD 9/19 w/ hx esophageal bleed s/p banding 2018 on propanolol, hx of encephalopathy on lactulose, neg HCC w/u as of 4/19, no ascites of 10/19), porcelain gallbladder, known R rectus sheath mass, liver lesion and R renal pole lesions of unclear etiology, SMA stenosis and recent hospitalizations in 11/2019 for severe RUQ pain 2/2 pancreatitis from cholelithiasis/choledocholithiasis s/p ERCP with stone extraction and f/u hospitalization with plastic stent placement w/o spontaneous dissolution and hematochezia 2/2 hemorrhoids, colonic AVMs (s/p cauterization) who p/w 1 wk of melena and lower abdominal pain. Pt is A0x3, able to point to his pain and give nods to his medical history but is otherwise poor historian. Attempted to lynette wife Shannon x2, unable to reach. Per outpt GI chart note, wife had called reporting black tarry stools x24hrs, pale complexion and fatigue. Pt was rec'd to go the ED for consideration of endoscopy. Pt reports tht his abdominal pain is lower in both lower quadrants, L>R, worsened by bowel movements, unchanged with meals though reports decreased PO intake. Also reports that he was having bright red bowel [id number]  and started having black tarry stools today.  Reports mild dizziness but denies fevers/chills, CP, SOB, palpitations, abdominal pain, diarrhea or constipation, urinary changes.      4/2: s/p EGD -grade I esophageal varices and multiple non-bleeding duodenal ulcers with pigmented material treated with argon plasma coagulation (APC), s/p 2 units prbc,  good response hgb 9.9. advancing diet     on 4/4, pt had severe coughing episode after taken multiple pills at one shot, resolved. CXR  performed  on 4/4 Poor inspiratory effort. The heart is slightly enlarged. Mild increased interstitial marking however no acute consolidation could be identified. No change in the appearance of the chest when compared to previous study done October 28, 2019.    Pt's daughter requested pt get tested for Covid 19 which is (-) on 4/5.     Pt is clinically stable, HB 9 today  and no s/s GI bleed.

## 2020-04-03 NOTE — DISCHARGE NOTE PROVIDER - NSDCMRMEDTOKEN_GEN_ALL_CORE_FT
Calcium 500+D:   lactulose 10 g/15 mL oral syrup: 22.5 milliliter(s) orally 2 times a day  propranolol 10 mg oral tablet: 1 tab(s) orally 2 times a day  Vitamin D3: acetaminophen 500 mg oral tablet: 1 tab(s) orally every 6 hours, As needed, Mild Pain (1 - 3), Moderate Pain (4 - 6), Severe Pain (7 - 10)  Calcium 500+D: 1 tab(s) orally once a day  lactulose 10 g/15 mL oral syrup: 22.5 milliliter(s) orally 2 times a day  pantoprazole 40 mg oral delayed release tablet: 1 tab(s) orally 2 times a day  propranolol 10 mg oral tablet: 1 tab(s) orally 2 times a day  sucralfate 1 g oral tablet: 1 tab(s) orally 4 times a day

## 2020-04-03 NOTE — PROGRESS NOTE ADULT - ATTENDING COMMENTS
Hepatology Staff: Vera Bobo MD    I saw and examined the patient along with  Dr. Cyr 04-03-20.    Patient Medical Record, hospital course was reviewed and summarized as below:    Vitals: Vital Signs Last 24 Hrs  T(C): 35.7 (03 Apr 2020 09:55), Max: 36.1 (03 Apr 2020 07:20)  T(F): 96.3 (03 Apr 2020 09:55), Max: 97 (03 Apr 2020 07:20)  HR: 54 (03 Apr 2020 09:55) (54 - 75)  BP: 109/63 (03 Apr 2020 09:55) (94/58 - 130/81)    RR: 18 (03 Apr 2020 09:55) (18 - 20)  SpO2: 99% (03 Apr 2020 09:55) (98% - 100%)    Imaging Studies:  I/O: I&O's Summary    02 Apr 2020 07:01  -  03 Apr 2020 07:00  --------------------------------------------------------  IN: 1437 mL / OUT: 1100 mL / NET: 337 mL    Recommendations: Noted significant drop in H/H. Hb is 5 g/dl- vital stable. Pt had EGD yesterday-two DU's, One of them was treated with APC (dark spot). Likely recurrent bleed, but appears to have stabilized as pt had no BMs since last > 12 hrs. Noted pt has received  unit of PRBC, and the second unit of PRBC is ongoing. Would recommend STAT CBC.  If inappropriate response with transfusion then will plan for a repeat EGD,. Keep on IV PPI gtt, and Carafate suspension.     Plan discussed with Primary team. Hepatology Staff: Vera Bobo MD    I have reviewed the EMR of the patient along with  Dr. Cyr 04-03-20. I also reviewed the physical exam findings noted by the primary team.    Patient Medical Record, hospital course was reviewed and summarized as below:    Vitals: Vital Signs Last 24 Hrs  T(C): 35.7 (03 Apr 2020 09:55), Max: 36.1 (03 Apr 2020 07:20)  T(F): 96.3 (03 Apr 2020 09:55), Max: 97 (03 Apr 2020 07:20)  HR: 54 (03 Apr 2020 09:55) (54 - 75)  BP: 109/63 (03 Apr 2020 09:55) (94/58 - 130/81)    RR: 18 (03 Apr 2020 09:55) (18 - 20)  SpO2: 99% (03 Apr 2020 09:55) (98% - 100%)    Imaging Studies:  I/O: I&O's Summary    02 Apr 2020 07:01  -  03 Apr 2020 07:00  --------------------------------------------------------  IN: 1437 mL / OUT: 1100 mL / NET: 337 mL    Recommendations: Noted significant drop in H/H. Hb is 5 g/dl- vital stable. Pt had EGD yesterday-two DU's, One of them was treated with APC (dark spot). Likely recurrent bleed, but appears to have stabilized as pt had no BMs since last > 12 hrs. Noted pt has received  unit of PRBC, and the second unit of PRBC is ongoing. Would recommend STAT CBC.  If inappropriate response with transfusion then will plan for a repeat EGD,. Keep on IV PPI gtt, and Carafate suspension.     Plan discussed with Primary team.

## 2020-04-03 NOTE — CHART NOTE - NSCHARTNOTEFT_GEN_A_CORE
MEDICINE PA  Notified by RN patient Hgb/hct is 5.0/16.3. Patient seen and examined at bedside, lying comfortably, eating apple sauce and in NAD.  Patient denies. HA, visual changes, CP, palp SOB, cough, abdominal pain, N/V/D/C.    Vital Signs Last 24 Hrs  T(C): 35.9 (02 Apr 2020 16:18), Max: 36.6 (02 Apr 2020 09:00)  T(F): 96.6 (02 Apr 2020 16:18), Max: 97.9 (02 Apr 2020 09:00)  HR: 75 (03 Apr 2020 01:14) (56 - 79)  BP: 115/67 (03 Apr 2020 01:14) (80/46 - 120/64)  BP(mean): --  RR: 18 (03 Apr 2020 01:14) (18 - 20)  SpO2: 100% (03 Apr 2020 01:14) (97% - 100%)    LABS:  Complete Blood Count STAT (04.03.20 @ 00:03)    Nucleated RBC: 0 /100 WBCs    WBC Count: 6.48 K/uL    RBC Count: 1.87 M/uL    Hemoglobin: 5.0 g/dL    Hematocrit: 16.3 %    Mean Cell Volume: 87.2 fl    Mean Cell Hemoglobin: 26.7 pg    Mean Cell Hemoglobin Conc: 30.7 gm/dL    Red Cell Distrib Width: 16.8 %    Platelet Count - Automated: 91: Smear Reviewed, Result Confirmed. K/uL      >PHYSICAL EXAM:  General: AOx3. NAD, lying comfortably  Cardiovascular: normal S1/s2, RRR  Respiratory:  CTA b/l no wheezing, rales or rhonchi  Gastrointestinal: nontender nondistended +BS  Skin: no pallor of the skin or the conjuctiva, warm to touch      >ASSESSMENT/PLAN:   79M w/ HTN, CKD3, GERD, PUD/hx Hpylori, distant GBS, decompensated alcoholic cirrhosis (now sober, w/ rectal and small esoph varices on propanolol, hx of encephalopathy on lactulose, neg HCC w/u, neg ascites, porcelain gallbladder, known R rectus sheath mass and liver lesion, SMA stenosis s/p EGD which found and cauterized likely source of bleed in stomach now presenting with low hgb    # Anemia  - Likely 2/2 GI Bleed which has been worked up and treated via cauterization in the stomach  - 2 Units PRBC for hgb 5.0  - C/w protonix  -C/w CBC Q8  - C/w CL diet as tolerated     Will continue monitoring closely   F/U Primary care team in AM    Linda Zamudio PA-C  Medicine Team  [phone]

## 2020-04-03 NOTE — PROGRESS NOTE ADULT - ASSESSMENT
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    Anemia due to blood loss.   - s/p Tx  - HD stable  - PPI  - discontinue octreotide    S/P EGD with duodenal ulcers  - diet as tolerated  - PPI  -Carafate  - GI follow  - monitor Hgb q8hr  - maintain Hgb>7.0, active T+S.     Lower abdominal pain.   -p/w bl lower abdominal pain  -no evidence of large stool burden, worsening biliary pathology on CT abd/pelvis  -rectus sheath mass appears stable  -possibly 2/2 persistent CBD stent?  -pain control: conservative tylenol use (given cirrhosis), can also try oxycodone for breakthrough if uncontrolled  -GI follow     Alcoholic cirrhosis of liver without ascites.  - decompensated ETOH cirrhosis w/ esophageal and rectal varices on propanolol, hx hepatic encephalopathy on lactulose, neg HCC w/u  - MELD-Na 16, stable  - c/w lactulose, propanolol  - Hepatology follow.     Hyperkalemia.   - K 6.3 ->5.6, now s/p humulin/D50 and 500cc bolus  - lokelma x1  - now resumed on lactulose  - appears to have stable CKD, unclear etiology of hyperkalemia   - no EKG changes, asymptomatic.     Essential hypertension.  - propanolol for varices  - BPs well controlled as inpt  - monitor daily.     CKD (chronic kidney disease), stage III.  - Cr appears to have worsened over the past year, unclear if new baseline (1.4-2.0)  - no e/o KAY  - monitor Cr daily  - avoid nephrotoxic agents  renally dose meds.    Preventive measure.   - DVT ppx: SCDs for now given bleed    Chapo Bennett MD pager [phone] 79M w/ HTN, CKD3, GERD, PUD/hx Hpylori, distant GBS, decompensated alcoholic cirrhosis (now sober, w/ rectal and small esoph varices on propanolol, hx of encephalopathy on lactulose, neg HCC w/u, neg ascites, porcelain gallbladder, known R rectus sheath mass and liver lesion, SMA stenosis and recent hospitalizations in 11/2019 for pancreatitis 2/2 choledocholithiasis s/p ERCP with stone extraction and plastic stent placement w/o spontaneous dissolution, hematochezia 2/2 hemorrhoids/ colonic AVMs (s/p cauterization) who p/w 1 wk melena and lower abdominal pain, found to have acute on chronic anemia, admitted for mgmt of likely UGIB.    Anemia due to blood loss.   - s/p Tx  - HD stable  - PPI  - discontinue octreotide    S/P EGD with duodenal ulcers  - diet as tolerated  - PPI  -Carafate  - GI follow  - monitor Hgb q8hr  - maintain Hgb>7.0, active T+S.     Lower abdominal pain.   -p/w bl lower abdominal pain  -no evidence of large stool burden, worsening biliary pathology on CT abd/pelvis  -rectus sheath mass appears stable  -possibly 2/2 persistent CBD stent?  -pain control: conservative tylenol use (given cirrhosis), can also try oxycodone for breakthrough if uncontrolled  -GI follow     Alcoholic cirrhosis of liver without ascites.  - decompensated ETOH cirrhosis w/ esophageal and rectal varices on propanolol, hx hepatic encephalopathy on lactulose, neg HCC w/u  - MELD-Na 16, stable  - c/w lactulose, propanolol  - Hepatology follow.   - discontinue Ceftriaxone    Hyperkalemia.   - K 6.3 ->5.6, now s/p humulin/D50 and 500cc bolus  - lokelma x1  - now resumed on lactulose  - appears to have stable CKD, unclear etiology of hyperkalemia   - no EKG changes, asymptomatic.     Essential hypertension.  - propanolol for varices  - BPs well controlled as inpt  - monitor daily.     CKD (chronic kidney disease), stage III.  - Cr appears to have worsened over the past year, unclear if new baseline (1.4-2.0)  - no e/o KAY  - monitor Cr daily  - avoid nephrotoxic agents  renally dose meds.  - gentle IVF    Preventive measure.   - DVT ppx: SCDs for now given bleed    Chapo Abrudescu MD pager [phone]

## 2020-04-03 NOTE — DISCHARGE NOTE PROVIDER - NSDCFUADDAPPT_GEN_ALL_CORE_FT
Follow up with PMD 1-2 weeks after discharge Follow up with PMD 1-2 weeks after discharge  Follow up with Hepatology as outpatient on discharge (#[phone], [phone])

## 2020-04-03 NOTE — DISCHARGE NOTE PROVIDER - NSDCFUSCHEDAPPT_GEN_ALL_CORE_FT
KETAN TIPTON ; 05/01/2020 ; [facility] [address] [place] [address] KETAN TIPTON ; 05/01/2020 ; [facility] [address] [place] [address] KETAN TIPTON ; 05/01/2020 ; [facility] [address] [place] [address]

## 2020-04-03 NOTE — DISCHARGE NOTE PROVIDER - CARE PROVIDER_API CALL
Richie Mcgraw)  Internal Medicine  [address] [place] [address]  Phone: [phone]  Fax: [phone]  Follow Up Time: Richie Mcgraw)  Internal Medicine  [address] [place] [address]  Phone: [phone]  Fax: [phone]  Follow Up Time:     Hepatologist,   Phone: (   )    -  Fax: (   )    -  Follow Up Time: 1 week

## 2020-04-03 NOTE — PROGRESS NOTE ADULT - SUBJECTIVE AND OBJECTIVE BOX
Chief Complaint:  Patient is a 80y old  Male who presents with a chief complaint of abdominal pain (02 Apr 2020 13:03)      Interval Events:     Allergies:  No Known Allergies      Hospital Medications:  acetaminophen   Tablet .. 500 milliGRAM(s) Oral every 6 hours PRN  calcium carbonate 1250 mG  + Vitamin D (OsCal 500 + D) 1 Tablet(s) Oral daily  cefTRIAXone   IVPB 1000 milliGRAM(s) IV Intermittent every 24 hours  pantoprazole Infusion 8 mG/Hr IV Continuous <Continuous>  phytonadione   Solution 10 milliGRAM(s) Oral daily  propranolol 10 milliGRAM(s) Oral every 12 hours  sodium chloride 0.9%. 1000 milliLiter(s) IV Continuous <Continuous>      PMHX/PSHX:  Porcelain gallbladder  Liver cirrhosis  Guillain-[place] syndrome  HTN (hypertension)  H/O inguinal hernia repair      Family history:  Family history of ovarian cancer (Sibling)      ROS:     General:  No wt loss, fevers, chills, night sweats, fatigue,   Eyes:  Good vision, no reported pain  ENT:  No sore throat, pain, runny nose, dysphagia  CV:  No pain, palpitations, hypo/hypertension  Resp:  No dyspnea, cough, tachypnea, wheezing  GI:  See HPI  :  No pain, bleeding, incontinence, nocturia  Muscle:  No pain, weakness  Neuro:  No weakness, tingling, memory problems  Psych:  No fatigue, insomnia, mood problems, depression  Endocrine:  No polyuria, polydipsia, cold/heat intolerance  Heme:  No petechiae, ecchymosis, easy bruisability  Skin:  No rash, edema      PHYSICAL EXAM:     GENERAL:  Appears stated age, well-groomed, well-nourished, no distress  HEENT:  NC/AT,  conjunctivae clear, sclera -anicteric  CHEST:  Full & symmetric excursion, no increased effort, breath sounds clear  HEART:  Regular rhythm, S1, S2, no murmur/rub/S3/S4,  no edema  ABDOMEN:  Soft, non-tender, non-distended, normoactive bowel sounds,  no masses ,no hepato-splenomegaly,   EXTREMITIES:  no cyanosis,clubbing or edema  SKIN:  No rash/erythema/ecchymoses/petechiae/wounds/abscess/warm/dry  NEURO:  Alert, oriented    Vital Signs:  Vital Signs Last 24 Hrs  T(C): 35.7 (03 Apr 2020 05:09), Max: 36.6 (02 Apr 2020 09:00)  T(F): 96.3 (03 Apr 2020 05:09), Max: 97.9 (02 Apr 2020 09:00)  HR: 70 (03 Apr 2020 05:09) (56 - 75)  BP: 119/72 (03 Apr 2020 05:09) (80/46 - 119/72)  BP(mean): --  RR: 18 (03 Apr 2020 05:09) (18 - 20)  SpO2: 98% (03 Apr 2020 05:09) (97% - 100%)  Daily     Daily     LABS:                        5.0    6.48  )-----------( 91       ( 03 Apr 2020 00:03 )             16.3     04-02    139  |  107  |  72<H>  ----------------------------<  148<H>  5.2   |  20<L>  |  1.50<H>    Ca    8.8      02 Apr 2020 05:28  Phos  2.2     04-02  Mg     1.7     04-02    TPro  5.8<L>  /  Alb  2.9<L>  /  TBili  0.4  /  DBili  x   /  AST  12  /  ALT  10  /  AlkPhos  69  04-02    LIVER FUNCTIONS - ( 02 Apr 2020 05:28 )  Alb: 2.9 g/dL / Pro: 5.8 g/dL / ALK PHOS: 69 U/L / ALT: 10 U/L / AST: 12 U/L / GGT: x           PT/INR - ( 02 Apr 2020 05:28 )   PT: 18.6 sec;   INR: 1.59 ratio         PTT - ( 02 Apr 2020 05:28 )  PTT:29.3 sec        Imaging: Chief Complaint:  Patient is a 80y old  Male who presents with a chief complaint of abdominal pain (02 Apr 2020 13:03)      Interval Events:   - patient underwent an EGD yesterday  - no acute overnight events    Allergies:  No Known Allergies      Hospital Medications:  acetaminophen   Tablet .. 500 milliGRAM(s) Oral every 6 hours PRN  calcium carbonate 1250 mG  + Vitamin D (OsCal 500 + D) 1 Tablet(s) Oral daily  cefTRIAXone   IVPB 1000 milliGRAM(s) IV Intermittent every 24 hours  pantoprazole Infusion 8 mG/Hr IV Continuous <Continuous>  phytonadione   Solution 10 milliGRAM(s) Oral daily  propranolol 10 milliGRAM(s) Oral every 12 hours  sodium chloride 0.9%. 1000 milliLiter(s) IV Continuous <Continuous>      PMHX/PSHX:  Porcelain gallbladder  Liver cirrhosis  Guillain-[place] syndrome  HTN (hypertension)  H/O inguinal hernia repair      Family history:  Family history of ovarian cancer (Sibling)      Vital Signs:  Vital Signs Last 24 Hrs  T(C): 35.7 (03 Apr 2020 05:09), Max: 36.6 (02 Apr 2020 09:00)  T(F): 96.3 (03 Apr 2020 05:09), Max: 97.9 (02 Apr 2020 09:00)  HR: 70 (03 Apr 2020 05:09) (56 - 75)  BP: 119/72 (03 Apr 2020 05:09) (80/46 - 119/72)  BP(mean): --  RR: 18 (03 Apr 2020 05:09) (18 - 20)  SpO2: 98% (03 Apr 2020 05:09) (97% - 100%)  Daily     Daily     LABS:                        5.0    6.48  )-----------( 91       ( 03 Apr 2020 00:03 )             16.3     04-02    139  |  107  |  72<H>  ----------------------------<  148<H>  5.2   |  20<L>  |  1.50<H>    Ca    8.8      02 Apr 2020 05:28  Phos  2.2     04-02  Mg     1.7     04-02    TPro  5.8<L>  /  Alb  2.9<L>  /  TBili  0.4  /  DBili  x   /  AST  12  /  ALT  10  /  AlkPhos  69  04-02    LIVER FUNCTIONS - ( 02 Apr 2020 05:28 )  Alb: 2.9 g/dL / Pro: 5.8 g/dL / ALK PHOS: 69 U/L / ALT: 10 U/L / AST: 12 U/L / GGT: x         PT/INR - ( 02 Apr 2020 05:28 )   PT: 18.6 sec;   INR: 1.59 ratio    PTT - ( 02 Apr 2020 05:28 )  PTT:29.3 sec      Imaging:    < from: Upper Endoscopy (04.02.20 @ 12:21) >  Procedure:           Upper GI endoscopy  Indications:         Melena  Providers:           Vera Bobo MD  Medicines:           Propofol per Anesthesia  Complications:       No immediate complications.  ____________________________________________________________________________________________________  Procedure:           After obtaining informed consent, the endoscope was passed under direct                        vision. Throughout the procedure, the patient's blood pressure, pulse, and                        oxygen saturations were monitored continuously. The Endoscope was introduced                        through the mouth, and advanced to the second part of duodenum. The upper GI                        endoscopy was accomplished without difficulty. The patient tolerated the                        procedure well.                                                                                                        Findings:       Grade I varices were found at the gastroesophageal junction. They were 6 mm in largest        diameter.       The entire examined stomach was normal. Biopsies were taken with a cold forceps for        Helicobacter pylori testing.       Two non-bleeding cratered duodenal ulcers with pigmented material were found in the first        part of the duodenum and in the second part of the duodenum. The largest lesion was 8 mm in        largest dimension. Coagulation for bleeding prevention using argon plasma was successful.      Biliary stent was noted.                                                                                                        Impression:          - Grade I esophageal varices.                       - Normal stomach. Biopsied.      - Multiple non-bleeding duodenal ulcers with pigmented material. Treated with                        argon plasma coagulation (APC).  Recommendation:      - Observe patient in GI recovery unit for ongoing care.                       - Clear liquid diet today.                       - Use Protonix (pantoprazole) 40 mg IV daily bid.                       -Discontinue IV Octreotiode.                       - Use sucralfate tablets 1 gram PO QID.    < end of copied text >      < from: CT Abdomen and Pelvis w/ IV Cont (04.01.20 @ 20:11) >    LOWER CHEST: Aortic valve and coronary artery calcifications.  LIVER: Cirrhosis.  BILE DUCTS: Normal caliber.  GALLBLADDER: Unchanged cholelithiasis and calcified gallbladder wall.  SPLEEN: Within normal limits.  PANCREAS: Pancreatic duct stent within the lesser pancreatic duct. The distal tip terminates in the duodenum.  ADRENALS: Within normal limits.  KIDNEYS/URETERS: Within normal limits.    BLADDER: Within normal limits.  REPRODUCTIVE ORGANS: Prostate within normal limits.  BOWEL: No bowel obstruction. Appendixis normal.  PERITONEUM: No ascites.  VESSELS: Atherosclerotic changes.  RETROPERITONEUM/LYMPH NODES: No lymphadenopathy.    ABDOMINAL WALL: A unchanged 2.1 x 3.9 cm mass in the right rectus sheath and right inguinal hernia repair plug.  BONES: Degenerative changes.    IMPRESSION: Etiology of patient's abdominal pain is elucidated.  < end of copied text > Chief Complaint:  Patient is a 80y old  Male who presents with a chief complaint of abdominal pain (02 Apr 2020 13:03)      Interval Events:   - patient underwent an EGD yesterday  - Hb dropped to 5 overnight, patient has not had an BMs  - getting 2u pRBC    Allergies:  No Known Allergies      Hospital Medications:  acetaminophen   Tablet .. 500 milliGRAM(s) Oral every 6 hours PRN  calcium carbonate 1250 mG  + Vitamin D (OsCal 500 + D) 1 Tablet(s) Oral daily  cefTRIAXone   IVPB 1000 milliGRAM(s) IV Intermittent every 24 hours  pantoprazole Infusion 8 mG/Hr IV Continuous <Continuous>  phytonadione   Solution 10 milliGRAM(s) Oral daily  propranolol 10 milliGRAM(s) Oral every 12 hours  sodium chloride 0.9%. 1000 milliLiter(s) IV Continuous <Continuous>      PMHX/PSHX:  Porcelain gallbladder  Liver cirrhosis  Guillain-[place] syndrome  HTN (hypertension)  H/O inguinal hernia repair      Family history:  Family history of ovarian cancer (Sibling)      Vital Signs:  Vital Signs Last 24 Hrs  T(C): 35.7 (03 Apr 2020 05:09), Max: 36.6 (02 Apr 2020 09:00)  T(F): 96.3 (03 Apr 2020 05:09), Max: 97.9 (02 Apr 2020 09:00)  HR: 70 (03 Apr 2020 05:09) (56 - 75)  BP: 119/72 (03 Apr 2020 05:09) (80/46 - 119/72)  BP(mean): --  RR: 18 (03 Apr 2020 05:09) (18 - 20)  SpO2: 98% (03 Apr 2020 05:09) (97% - 100%)  Daily     Daily     LABS:                        5.0    6.48  )-----------( 91       ( 03 Apr 2020 00:03 )             16.3     04-02    139  |  107  |  72<H>  ----------------------------<  148<H>  5.2   |  20<L>  |  1.50<H>    Ca    8.8      02 Apr 2020 05:28  Phos  2.2     04-02  Mg     1.7     04-02    TPro  5.8<L>  /  Alb  2.9<L>  /  TBili  0.4  /  DBili  x   /  AST  12  /  ALT  10  /  AlkPhos  69  04-02    LIVER FUNCTIONS - ( 02 Apr 2020 05:28 )  Alb: 2.9 g/dL / Pro: 5.8 g/dL / ALK PHOS: 69 U/L / ALT: 10 U/L / AST: 12 U/L / GGT: x         PT/INR - ( 02 Apr 2020 05:28 )   PT: 18.6 sec;   INR: 1.59 ratio    PTT - ( 02 Apr 2020 05:28 )  PTT:29.3 sec      Imaging:    < from: Upper Endoscopy (04.02.20 @ 12:21) >  Procedure:           Upper GI endoscopy  Indications:         Melena  Providers:           Vera Bobo MD  Medicines:           Propofol per Anesthesia  Complications:       No immediate complications.  ____________________________________________________________________________________________________  Procedure:           After obtaining informed consent, the endoscope was passed under direct                        vision. Throughout the procedure, the patient's blood pressure, pulse, and                        oxygen saturations were monitored continuously. The Endoscope was introduced                        through the mouth, and advanced to the second part of duodenum. The upper GI                        endoscopy was accomplished without difficulty. The patient tolerated the                        procedure well.                                                                                                        Findings:       Grade I varices were found at the gastroesophageal junction. They were 6 mm in largest        diameter.       The entire examined stomach was normal. Biopsies were taken with a cold forceps for        Helicobacter pylori testing.       Two non-bleeding cratered duodenal ulcers with pigmented material were found in the first        part of the duodenum and in the second part of the duodenum. The largest lesion was 8 mm in        largest dimension. Coagulation for bleeding prevention using argon plasma was successful.      Biliary stent was noted.                                                                                                        Impression:          - Grade I esophageal varices.                       - Normal stomach. Biopsied.      - Multiple non-bleeding duodenal ulcers with pigmented material. Treated with                        argon plasma coagulation (APC).  Recommendation:      - Observe patient in GI recovery unit for ongoing care.                       - Clear liquid diet today.                       - Use Protonix (pantoprazole) 40 mg IV daily bid.                       -Discontinue IV Octreotiode.                       - Use sucralfate tablets 1 gram PO QID.    < end of copied text >      < from: CT Abdomen and Pelvis w/ IV Cont (04.01.20 @ 20:11) >    LOWER CHEST: Aortic valve and coronary artery calcifications.  LIVER: Cirrhosis.  BILE DUCTS: Normal caliber.  GALLBLADDER: Unchanged cholelithiasis and calcified gallbladder wall.  SPLEEN: Within normal limits.  PANCREAS: Pancreatic duct stent within the lesser pancreatic duct. The distal tip terminates in the duodenum.  ADRENALS: Within normal limits.  KIDNEYS/URETERS: Within normal limits.    BLADDER: Within normal limits.  REPRODUCTIVE ORGANS: Prostate within normal limits.  BOWEL: No bowel obstruction. Appendixis normal.  PERITONEUM: No ascites.  VESSELS: Atherosclerotic changes.  RETROPERITONEUM/LYMPH NODES: No lymphadenopathy.    ABDOMINAL WALL: A unchanged 2.1 x 3.9 cm mass in the right rectus sheath and right inguinal hernia repair plug.  BONES: Degenerative changes.    IMPRESSION: Etiology of patient's abdominal pain is elucidated.  < end of copied text >

## 2020-04-03 NOTE — DISCHARGE NOTE PROVIDER - NSDCCPCAREPLAN_GEN_ALL_CORE_FT
PRINCIPAL DISCHARGE DIAGNOSIS  Diagnosis: GI bleeding  Assessment and Plan of Treatment: No further bleeding, call your PMD if note dark stools or bright red blood in your stool. PRINCIPAL DISCHARGE DIAGNOSIS  Diagnosis: GI bleeding  Assessment and Plan of Treatment: No further bleeding, call your PMD if note dark stools or bright red blood in your stool.  Take medications as prescribed   Follow up with hepatologist 1 week or ASAP after discharge

## 2020-04-04 LAB
ALBUMIN SERPL ELPH-MCNC: 2.6 G/DL — LOW (ref 3.3–5)
ALP SERPL-CCNC: 76 U/L — SIGNIFICANT CHANGE UP (ref 40–120)
ALT FLD-CCNC: 15 U/L — SIGNIFICANT CHANGE UP (ref 10–45)
ANION GAP SERPL CALC-SCNC: 10 MMOL/L — SIGNIFICANT CHANGE UP (ref 5–17)
AST SERPL-CCNC: 24 U/L — SIGNIFICANT CHANGE UP (ref 10–40)
BILIRUB SERPL-MCNC: 0.4 MG/DL — SIGNIFICANT CHANGE UP (ref 0.2–1.2)
BUN SERPL-MCNC: 33 MG/DL — HIGH (ref 7–23)
CALCIUM SERPL-MCNC: 8.4 MG/DL — SIGNIFICANT CHANGE UP (ref 8.4–10.5)
CHLORIDE SERPL-SCNC: 112 MMOL/L — HIGH (ref 96–108)
CO2 SERPL-SCNC: 19 MMOL/L — LOW (ref 22–31)
CREAT SERPL-MCNC: 1.34 MG/DL — HIGH (ref 0.5–1.3)
GLUCOSE SERPL-MCNC: 137 MG/DL — HIGH (ref 70–99)
HCT VFR BLD CALC: 30 % — LOW (ref 39–50)
HCT VFR BLD CALC: 30.1 % — LOW (ref 39–50)
HCT VFR BLD CALC: 34.8 % — LOW (ref 39–50)
HGB BLD-MCNC: 11 G/DL — LOW (ref 13–17)
HGB BLD-MCNC: 9.5 G/DL — LOW (ref 13–17)
HGB BLD-MCNC: 9.8 G/DL — LOW (ref 13–17)
INR BLD: 1.46 RATIO — HIGH (ref 0.88–1.16)
MAGNESIUM SERPL-MCNC: 1.6 MG/DL — SIGNIFICANT CHANGE UP (ref 1.6–2.6)
MCHC RBC-ENTMCNC: 27.6 PG — SIGNIFICANT CHANGE UP (ref 27–34)
MCHC RBC-ENTMCNC: 27.8 PG — SIGNIFICANT CHANGE UP (ref 27–34)
MCHC RBC-ENTMCNC: 27.8 PG — SIGNIFICANT CHANGE UP (ref 27–34)
MCHC RBC-ENTMCNC: 31.6 GM/DL — LOW (ref 32–36)
MCHC RBC-ENTMCNC: 31.6 GM/DL — LOW (ref 32–36)
MCHC RBC-ENTMCNC: 32.7 GM/DL — SIGNIFICANT CHANGE UP (ref 32–36)
MCV RBC AUTO: 85 FL — SIGNIFICANT CHANGE UP (ref 80–100)
MCV RBC AUTO: 87.2 FL — SIGNIFICANT CHANGE UP (ref 80–100)
MCV RBC AUTO: 88 FL — SIGNIFICANT CHANGE UP (ref 80–100)
NRBC # BLD: 0 /100 WBCS — SIGNIFICANT CHANGE UP (ref 0–0)
PHOSPHATE SERPL-MCNC: 2 MG/DL — LOW (ref 2.5–4.5)
PLATELET # BLD AUTO: 118 K/UL — LOW (ref 150–400)
PLATELET # BLD AUTO: 120 K/UL — LOW (ref 150–400)
PLATELET # BLD AUTO: 157 K/UL — SIGNIFICANT CHANGE UP (ref 150–400)
POTASSIUM SERPL-MCNC: 4.4 MMOL/L — SIGNIFICANT CHANGE UP (ref 3.5–5.3)
POTASSIUM SERPL-SCNC: 4.4 MMOL/L — SIGNIFICANT CHANGE UP (ref 3.5–5.3)
PROT SERPL-MCNC: 5.4 G/DL — LOW (ref 6–8.3)
PROTHROM AB SERPL-ACNC: 16.8 SEC — HIGH (ref 10–13.1)
RBC # BLD: 3.42 M/UL — LOW (ref 4.2–5.8)
RBC # BLD: 3.53 M/UL — LOW (ref 4.2–5.8)
RBC # BLD: 3.99 M/UL — LOW (ref 4.2–5.8)
RBC # FLD: 16.2 % — HIGH (ref 10.3–14.5)
RBC # FLD: 17.2 % — HIGH (ref 10.3–14.5)
RBC # FLD: 17.3 % — HIGH (ref 10.3–14.5)
SODIUM SERPL-SCNC: 141 MMOL/L — SIGNIFICANT CHANGE UP (ref 135–145)
WBC # BLD: 6.97 K/UL — SIGNIFICANT CHANGE UP (ref 3.8–10.5)
WBC # BLD: 7.88 K/UL — SIGNIFICANT CHANGE UP (ref 3.8–10.5)
WBC # BLD: 9.38 K/UL — SIGNIFICANT CHANGE UP (ref 3.8–10.5)
WBC # FLD AUTO: 6.97 K/UL — SIGNIFICANT CHANGE UP (ref 3.8–10.5)
WBC # FLD AUTO: 7.88 K/UL — SIGNIFICANT CHANGE UP (ref 3.8–10.5)
WBC # FLD AUTO: 9.38 K/UL — SIGNIFICANT CHANGE UP (ref 3.8–10.5)

## 2020-04-04 PROCEDURE — 71045 X-RAY EXAM CHEST 1 VIEW: CPT | Mod: 26

## 2020-04-04 PROCEDURE — 99232 SBSQ HOSP IP/OBS MODERATE 35: CPT | Mod: GC

## 2020-04-04 RX ADMIN — Medication 1 GRAM(S): at 11:59

## 2020-04-04 RX ADMIN — PANTOPRAZOLE SODIUM 40 MILLIGRAM(S): 20 TABLET, DELAYED RELEASE ORAL at 06:20

## 2020-04-04 RX ADMIN — PANTOPRAZOLE SODIUM 40 MILLIGRAM(S): 20 TABLET, DELAYED RELEASE ORAL at 18:55

## 2020-04-04 RX ADMIN — Medication 1 GRAM(S): at 06:20

## 2020-04-04 RX ADMIN — LACTULOSE 15 GRAM(S): 10 SOLUTION ORAL at 06:19

## 2020-04-04 RX ADMIN — SODIUM CHLORIDE 50 MILLILITER(S): 9 INJECTION INTRAMUSCULAR; INTRAVENOUS; SUBCUTANEOUS at 00:52

## 2020-04-04 RX ADMIN — Medication 1 GRAM(S): at 18:56

## 2020-04-04 RX ADMIN — Medication 1 TABLET(S): at 18:55

## 2020-04-04 RX ADMIN — Medication 1 GRAM(S): at 00:52

## 2020-04-04 RX ADMIN — SODIUM CHLORIDE 50 MILLILITER(S): 9 INJECTION INTRAMUSCULAR; INTRAVENOUS; SUBCUTANEOUS at 06:19

## 2020-04-04 NOTE — PROGRESS NOTE ADULT - ATTENDING COMMENTS
patient seen with the GI fellow. I agree with plan as above.  Patient will have outpatient telehealth follow up

## 2020-04-04 NOTE — PROGRESS NOTE ADULT - SUBJECTIVE AND OBJECTIVE BOX
Patient is a 80y old  Male who presents with a chief complaint of Abdominal pain (04 Apr 2020 05:36)      SUBJECTIVE / OVERNIGHT EVENTS: Comfortable without new complaints.   Review of Systems  chest pain no  palpitations no  sob no  nausea no  headache no    MEDICATIONS  (STANDING):  calcium carbonate 1250 mG  + Vitamin D (OsCal 500 + D) 1 Tablet(s) Oral daily  lactulose Syrup 15 Gram(s) Oral two times a day  pantoprazole    Tablet 40 milliGRAM(s) Oral two times a day  propranolol 10 milliGRAM(s) Oral every 12 hours  sodium chloride 0.9%. 1000 milliLiter(s) (50 mL/Hr) IV Continuous <Continuous>  sucralfate 1 Gram(s) Oral four times a day    MEDICATIONS  (PRN):  acetaminophen   Tablet .. 500 milliGRAM(s) Oral every 6 hours PRN Mild Pain (1 - 3), Moderate Pain (4 - 6), Severe Pain (7 - 10)      Vital Signs Last 24 Hrs  T(C): 37.1 (04 Apr 2020 17:21), Max: 37.1 (03 Apr 2020 22:17)  T(F): 98.8 (04 Apr 2020 17:21), Max: 98.8 (03 Apr 2020 22:17)  HR: 69 (04 Apr 2020 17:21) (57 - 75)  BP: 125/76 (04 Apr 2020 17:21) (119/68 - 136/78)  BP(mean): --  RR: 18 (04 Apr 2020 17:21) (18 - 18)  SpO2: 99% (04 Apr 2020 17:21) (98% - 99%)    PHYSICAL EXAM:  GENERAL: NAD, well-developed  HEAD:  Atraumatic, Normocephalic  EYES: EOMI, PERRLA, conjunctiva and sclera clear  NECK: Supple, No JVD  CHEST/LUNG: Clear to auscultation bilaterally; No wheeze  HEART: Regular rate and rhythm; No murmurs, rubs, or gallops  ABDOMEN: Soft, Nontender, Nondistended; Bowel sounds present  EXTREMITIES:  2+ Peripheral Pulses, No clubbing, cyanosis, or edema  PSYCH: AAOx3  NEUROLOGY: non-focal  SKIN: No rashes or lesions    LABS:                        9.5    6.97  )-----------( 120      ( 04 Apr 2020 10:20 )             30.1     04-04    141  |  112<H>  |  33<H>  ----------------------------<  137<H>  4.4   |  19<L>  |  1.34<H>    Ca    8.4      04 Apr 2020 10:20  Phos  2.0     04-04  Mg     1.6     04-04    TPro  5.4<L>  /  Alb  2.6<L>  /  TBili  0.4  /  DBili  x   /  AST  24  /  ALT  15  /  AlkPhos  76  04-04    PT/INR - ( 04 Apr 2020 12:45 )   PT: 16.8 sec;   INR: 1.46 ratio                     RADIOLOGY & ADDITIONAL TESTS:    Imaging Personally Reviewed:    Consultant(s) Notes Reviewed:      Care Discussed with Consultants/Other Providers:

## 2020-04-04 NOTE — PROGRESS NOTE ADULT - ASSESSMENT
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    Anemia due to blood loss.   - s/p Tx  - HD stable  - PPI  - discontinue octreotide    S/P EGD with duodenal ulcers  - diet as tolerated/ advance to regular and observe  - PPI  -Carafate  - GI follow  - monitor Hgb q8hr  - maintain Hgb>7.0, active T+S.     Lower abdominal pain.   -p/w bl lower abdominal pain  -no evidence of large stool burden, worsening biliary pathology on CT abd/pelvis  -rectus sheath mass appears stable  -possibly 2/2 persistent CBD stent?  -pain control: conservative tylenol use (given cirrhosis), can also try oxycodone for breakthrough if uncontrolled  -GI follow     Alcoholic cirrhosis of liver without ascites.  - decompensated ETOH cirrhosis w/ esophageal and rectal varices on propanolol, hx hepatic encephalopathy on lactulose, neg HCC w/u  - MELD-Na 16, stable  - c/w lactulose, propanolol  - Hepatology follow.   - discontinue Ceftriaxone    Hyperkalemia.   - K 6.3 ->5.6, now s/p humulin/D50 and 500cc bolus  - lokelma x1  - now resumed on lactulose  - appears to have stable CKD, unclear etiology of hyperkalemia   - no EKG changes, asymptomatic.     Essential hypertension.  - propanolol for varices  - BPs well controlled as inpt  - monitor daily.     CKD (chronic kidney disease), stage III.  - Cr appears to have worsened over the past year, unclear if new baseline (1.4-2.0)  - no e/o KAY  - monitor Cr daily  - avoid nephrotoxic agents  renally dose meds.  - gentle IVF    Preventive measure.   - DVT ppx: SCDs for now given bleed    DCP home.    Chapo Bennett MD pager [phone]

## 2020-04-04 NOTE — PROGRESS NOTE ADULT - ASSESSMENT
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, hospitalized x 4mths w/ paralysis), decompensated alcoholic cirrhosis (now sober, off transplant list at [facility] as improved, with small esophogeal varices last EGD 9/19 with hx esophageal bleed s/p banding 2018 on propanolol, hx of encephalopathy on lactulose, neg HCC w/u as of 4/19, no ascites of 10/19), porcelain gallbladder, known R rectus sheath mass, liver lesion and R renal pole lesions of unclear etiology, SMA stenosis and recent hospitalizations in 11/2019 for severe RUQ pain 2/2 pancreatitis from cholelithiasis/choledocholithiasis s/p ERCP with stone extraction and f/u hospitalization with plastic stent placement w/o spontaneous dissolution and hematochezia 2/2 hemorrhoids, colonic AVMs (s/p cauterization) who presented with melena and lower abdominal pain for one week, found to have non-bleeding duodenal ulcers on EGD on 4/2/20.     IMPRESSION  - Acute blood loss anemia with melena: Currently with no overt bleeding, HD stable, and with Hgb of ~ 10 overnight - repeat CBC pending from this morning. Underwent EGD 4/2/2020 with grade I esophageal varices and multiple non-bleeding duodenal ulcers with pigmented material treated with argon plasma coagulation (APC). Biopsies with no evidence of H. pylori.    - Decompensated alcoholic cirrhosis       Varices: small, hx esophageal bleed s/p banding 2018 on propanolol,        Encephalopathy: previously reported, on lactulose       HCC: negative workup CT 4/1/2020       Ascites: none CT 4/1/2020    Recommendations:  - No hepatology contraindication to discharge if Hgb is stable this AM and patient tolerates diet  - F/U AM CBC  - Continue PPI PO BID  - Continue Carafate  - Continue propanolol  - Continue lactulose  - Advance diet as tolerated  - Rest of care per primary team    Chris Joseph MD  Gastroenterology Fellow  Pager number:   [phone] (Long range pager)  [phone] (Face++J pager)  Pageable via [employer] at [facility] and [facility]. 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, hospitalized x 4mths w/ paralysis), decompensated alcoholic cirrhosis (now sober, off transplant list at [facility] as improved, with small esophogeal varices last EGD 9/19 with hx esophageal bleed s/p banding 2018 on propanolol, hx of encephalopathy on lactulose, neg HCC w/u as of 4/19, no ascites of 10/19), porcelain gallbladder, known R rectus sheath mass, liver lesion and R renal pole lesions of unclear etiology, SMA stenosis and recent hospitalizations in 11/2019 for severe RUQ pain 2/2 pancreatitis from cholelithiasis/choledocholithiasis s/p ERCP with stone extraction and f/u hospitalization with plastic stent placement w/o spontaneous dissolution and hematochezia 2/2 hemorrhoids, colonic AVMs (s/p cauterization) who presented with melena and lower abdominal pain for one week, found to have non-bleeding duodenal ulcers on EGD on 4/2/20.     IMPRESSION  - Acute blood loss anemia with melena: Currently with no overt bleeding, HD stable, and with Hgb of ~ 10 overnight - repeat CBC pending from this morning. Underwent EGD 4/2/2020 with grade I esophageal varices and multiple non-bleeding duodenal ulcers with pigmented material treated with argon plasma coagulation (APC). Biopsies with no evidence of H. pylori.    - Decompensated alcoholic cirrhosis       Varices: small, hx esophageal bleed s/p banding 2018 on propanolol,        Encephalopathy: previously reported, on lactulose       HCC: negative workup CT 4/1/2020       Ascites: none CT 4/1/2020    Recommendations:  - No hepatology contraindication to discharge if Hgb is stable this AM and patient tolerates diet  - F/U AM CBC  - Continue PPI PO BID  - Continue Carafate  - Continue propanolol  - Continue lactulose  - Advance diet as tolerated  - Rest of care per primary team    Chris Joseph MD  Gastroenterology Fellow  Pager number:   [phone] (Long range pager)  [phone] ([employer] pager)  Pageable via [employer] at [facility] and [facility]. 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, hospitalized x 4mths w/ paralysis), decompensated alcoholic cirrhosis (now sober, off transplant list at [facility] as improved, with small esophogeal varices last EGD 9/19 with hx esophageal bleed s/p banding 2018 on propanolol, hx of encephalopathy on lactulose, neg HCC w/u as of 4/19, no ascites of 10/19), porcelain gallbladder, known R rectus sheath mass, liver lesion and R renal pole lesions of unclear etiology, SMA stenosis and recent hospitalizations in 11/2019 for severe RUQ pain 2/2 pancreatitis from cholelithiasis/choledocholithiasis s/p ERCP with stone extraction and f/u hospitalization with plastic stent placement w/o spontaneous dissolution and hematochezia 2/2 hemorrhoids, colonic AVMs (s/p cauterization) who presented with melena and lower abdominal pain for one week, found to have non-bleeding duodenal ulcers on EGD on 4/2/20.     IMPRESSION  - Acute blood loss anemia with melena: Currently with no overt bleeding, HD stable, and with Hgb of ~ 10 overnight - repeat CBC pending from this morning. Underwent EGD 4/2/2020 with grade I esophageal varices and multiple non-bleeding duodenal ulcers with pigmented material treated with argon plasma coagulation (APC). Biopsies with no evidence of H. pylori.    - Decompensated alcoholic cirrhosis       Varices: small, hx esophageal bleed s/p banding 2018 on propanolol,        Encephalopathy: previously reported, on lactulose       HCC: negative workup CT 4/1/2020       Ascites: none CT 4/1/2020    Recommendations:  - No hepatology contraindication to discharge if Hgb is stable this AM and patient tolerates diet  - F/U AM CBC  - Continue PPI PO BID  - Continue Carafate  - Continue propanolol  - Continue lactulose  - Advance diet as tolerated  - Outpatient follow-up with Dr. Richie Mcgraw - we will set up follow-up via the Hepatology Office  - Rest of care per primary team    Chris Joseph MD  Gastroenterology Fellow  Pager number:   [phone] (Long range pager)  [phone] ([facility] pager)  Pageable via [place] at [facility] and [facility]. 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, hospitalized x 4mths w/ paralysis), decompensated alcoholic cirrhosis (now sober, off transplant list at [facility] as improved, with small esophogeal varices last EGD 9/19 with hx esophageal bleed s/p banding 2018 on propanolol, hx of encephalopathy on lactulose, neg HCC w/u as of 4/19, no ascites of 10/19), porcelain gallbladder, known R rectus sheath mass, liver lesion and R renal pole lesions of unclear etiology, SMA stenosis and recent hospitalizations in 11/2019 for severe RUQ pain 2/2 pancreatitis from cholelithiasis/choledocholithiasis s/p ERCP with stone extraction and f/u hospitalization with plastic stent placement w/o spontaneous dissolution and hematochezia 2/2 hemorrhoids, colonic AVMs (s/p cauterization) who presented with melena and lower abdominal pain for one week, found to have non-bleeding duodenal ulcers on EGD on 4/2/20.     IMPRESSION  - Acute blood loss anemia with melena: Currently with no overt bleeding, HD stable, and with Hgb of ~ 10 overnight - repeat CBC pending from this morning. Underwent EGD 4/2/2020 with grade I esophageal varices and multiple non-bleeding duodenal ulcers with pigmented material treated with argon plasma coagulation (APC). Biopsies with no evidence of H. pylori.    - Decompensated alcoholic cirrhosis       Varices: small, hx esophageal bleed s/p banding 2018 on propanolol,        Encephalopathy: previously reported, on lactulose       HCC: negative workup CT 4/1/2020       Ascites: none CT 4/1/2020    Recommendations:  - No hepatology contraindication to discharge if Hgb is stable this AM and patient tolerates diet  - F/U AM CBC  - Continue PPI PO BID  - Continue Carafate  - Continue propanolol  - Continue lactulose  - Advance diet as tolerated  - Outpatient follow-up with Dr. Richie Mcgraw - we will set up follow-up via the Hepatology Office  - Rest of care per primary team    Chris Joseph MD  Gastroenterology Fellow  Pager number:   [phone] (Long range pager)  [phone] ([facility] pager)  Pageable via [place] at [facility] and [facility]. Select Tools --> On Call ALISHA-ED --> Search for name  Also available on Microsoft Teams

## 2020-04-04 NOTE — PROGRESS NOTE ADULT - SUBJECTIVE AND OBJECTIVE BOX
Chief Complaint: Abdominal pain  Reason for consult: Melena    Interval Events:   - No report of bloody stools, black tarry stools, or bloody emesis  - The patient was transfused 2 unit pRBCs yesterday      Allergies:  No Known Allergies    Hospital Medications:  acetaminophen   Tablet .. 500 milliGRAM(s) Oral every 6 hours PRN  calcium carbonate 1250 mG  + Vitamin D (OsCal 500 + D) 1 Tablet(s) Oral daily  lactulose Syrup 15 Gram(s) Oral two times a day  pantoprazole    Tablet 40 milliGRAM(s) Oral two times a day  propranolol 10 milliGRAM(s) Oral every 12 hours  sodium chloride 0.9%. 1000 milliLiter(s) IV Continuous <Continuous>  sucralfate 1 Gram(s) Oral four times a day    PMHX/PSHX:  Porcelain gallbladder  Liver cirrhosis  Guillain-[place] syndrome  HTN (hypertension)  H/O inguinal hernia repair    Family history:  Family history of ovarian cancer (Sibling)      ROS:     General:  No wt loss, fevers, chills, night sweats, fatigue,   Eyes:  Good vision, no reported pain  ENT:  No sore throat, pain, runny nose, dysphagia  CV:  No pain, palpitations, hypo/hypertension  Pulm:  No dyspnea, cough, tachypnea, wheezing  GI:  No pain, No nausea, No vomiting, No diarrhea, No constipation, No weight loss, No fever, No pruritis, No rectal bleeding, No tarry stools, No dysphagia  :  No pain, bleeding, incontinence, nocturia  Muscle:  No pain, weakness  Neuro:  No weakness, tingling, + memory problems  Psych:  No fatigue, insomnia, mood problems, depression  Endocrine:  No polyuria, polydipsia, cold/heat intolerance  Heme:  No petechiae, ecchymosis, easy bruisability  Skin:  No rash, tattoos, scars, edema    PHYSICAL EXAM:   Vital Signs:  Vital Signs Last 24 Hrs  T(C): 36.7 (04 Apr 2020 04:47), Max: 37.1 (03 Apr 2020 18:00)  T(F): 98 (04 Apr 2020 04:47), Max: 98.8 (03 Apr 2020 18:00)  HR: 66 (04 Apr 2020 04:47) (54 - 75)  BP: 119/68 (04 Apr 2020 04:47) (109/63 - 136/78)  BP(mean): --  RR: 18 (04 Apr 2020 04:47) (18 - 18)  SpO2: 98% (04 Apr 2020 04:47) (93% - 99%)    GENERAL:  No acute distress  HEENT:  Normocephalic/atraumatic,  no scleral icterus  CHEST:  Normal effort, no accessory muscle use  ABDOMEN:  Soft, non-tender, non-distended, normoactive bowel sounds  EXTREMITIES:  No cyanosis, clubbing, or edema  SKIN:  No rash/erythema  NEURO:  Alert and oriented x 3, no asterixis     LABS:                        9.8    7.88  )-----------( 118      ( 04 Apr 2020 01:02 )             30.0     Mean Cell Volume: 85.0 fl (04-04-20 @ 01:02)    04-03    138  |  109<H>  |  49<H>  ----------------------------<  133<H>  5.1   |  14<L>  |  1.39<H>    Ca    8.5      03 Apr 2020 13:57  Phos  2.3     04-03  Mg     1.7     04-03    TPro  6.0  /  Alb  2.7<L>  /  TBili  0.6  /  DBili  x   /  AST  30  /  ALT  17  /  AlkPhos  76  04-03    LIVER FUNCTIONS - ( 03 Apr 2020 13:57 )  Alb: 2.7 g/dL / Pro: 6.0 g/dL / ALK PHOS: 76 U/L / ALT: 17 U/L / AST: 30 U/L / GGT: x                      9.8    7.88  )-----------( 118      ( 04 Apr 2020 01:02 )             30.0                         9.9    8.24  )-----------( 124      ( 03 Apr 2020 13:57 )             31.6                         5.0    6.48  )-----------( 91       ( 03 Apr 2020 00:03 )             16.3                         8.1    9.25  )-----------( 147      ( 02 Apr 2020 15:19 )             26.0                         7.0    9.63  )-----------( 150      ( 02 Apr 2020 05:28 )             22.3       Imaging:    No new imaging

## 2020-04-05 LAB
ALBUMIN SERPL ELPH-MCNC: 2.7 G/DL — LOW (ref 3.3–5)
ALP SERPL-CCNC: 88 U/L — SIGNIFICANT CHANGE UP (ref 40–120)
ALT FLD-CCNC: 13 U/L — SIGNIFICANT CHANGE UP (ref 10–45)
ANION GAP SERPL CALC-SCNC: 10 MMOL/L — SIGNIFICANT CHANGE UP (ref 5–17)
AST SERPL-CCNC: 21 U/L — SIGNIFICANT CHANGE UP (ref 10–40)
BILIRUB SERPL-MCNC: 0.4 MG/DL — SIGNIFICANT CHANGE UP (ref 0.2–1.2)
BUN SERPL-MCNC: 24 MG/DL — HIGH (ref 7–23)
CALCIUM SERPL-MCNC: 8.3 MG/DL — LOW (ref 8.4–10.5)
CHLORIDE SERPL-SCNC: 112 MMOL/L — HIGH (ref 96–108)
CO2 SERPL-SCNC: 19 MMOL/L — LOW (ref 22–31)
CREAT SERPL-MCNC: 1.33 MG/DL — HIGH (ref 0.5–1.3)
GLUCOSE SERPL-MCNC: 96 MG/DL — SIGNIFICANT CHANGE UP (ref 70–99)
HCT VFR BLD CALC: 27.9 % — LOW (ref 39–50)
HCT VFR BLD CALC: 28.9 % — LOW (ref 39–50)
HGB BLD-MCNC: 8.8 G/DL — LOW (ref 13–17)
HGB BLD-MCNC: 9.1 G/DL — LOW (ref 13–17)
MAGNESIUM SERPL-MCNC: 1.6 MG/DL — SIGNIFICANT CHANGE UP (ref 1.6–2.6)
MCHC RBC-ENTMCNC: 27.4 PG — SIGNIFICANT CHANGE UP (ref 27–34)
MCHC RBC-ENTMCNC: 27.5 PG — SIGNIFICANT CHANGE UP (ref 27–34)
MCHC RBC-ENTMCNC: 31.5 GM/DL — LOW (ref 32–36)
MCHC RBC-ENTMCNC: 31.5 GM/DL — LOW (ref 32–36)
MCV RBC AUTO: 87 FL — SIGNIFICANT CHANGE UP (ref 80–100)
MCV RBC AUTO: 87.2 FL — SIGNIFICANT CHANGE UP (ref 80–100)
NRBC # BLD: 0 /100 WBCS — SIGNIFICANT CHANGE UP (ref 0–0)
NRBC # BLD: 0 /100 WBCS — SIGNIFICANT CHANGE UP (ref 0–0)
PHOSPHATE SERPL-MCNC: 2.3 MG/DL — LOW (ref 2.5–4.5)
PLATELET # BLD AUTO: 125 K/UL — LOW (ref 150–400)
PLATELET # BLD AUTO: 127 K/UL — LOW (ref 150–400)
POTASSIUM SERPL-MCNC: 4.2 MMOL/L — SIGNIFICANT CHANGE UP (ref 3.5–5.3)
POTASSIUM SERPL-SCNC: 4.2 MMOL/L — SIGNIFICANT CHANGE UP (ref 3.5–5.3)
PROT SERPL-MCNC: 5.2 G/DL — LOW (ref 6–8.3)
RBC # BLD: 3.2 M/UL — LOW (ref 4.2–5.8)
RBC # BLD: 3.32 M/UL — LOW (ref 4.2–5.8)
RBC # FLD: 17.6 % — HIGH (ref 10.3–14.5)
RBC # FLD: 17.7 % — HIGH (ref 10.3–14.5)
SARS-COV-2 RNA SPEC QL NAA+PROBE: SIGNIFICANT CHANGE UP
SODIUM SERPL-SCNC: 141 MMOL/L — SIGNIFICANT CHANGE UP (ref 135–145)
WBC # BLD: 6.55 K/UL — SIGNIFICANT CHANGE UP (ref 3.8–10.5)
WBC # BLD: 8.54 K/UL — SIGNIFICANT CHANGE UP (ref 3.8–10.5)
WBC # FLD AUTO: 6.55 K/UL — SIGNIFICANT CHANGE UP (ref 3.8–10.5)
WBC # FLD AUTO: 8.54 K/UL — SIGNIFICANT CHANGE UP (ref 3.8–10.5)

## 2020-04-05 PROCEDURE — 99232 SBSQ HOSP IP/OBS MODERATE 35: CPT | Mod: GC

## 2020-04-05 RX ORDER — BENZOCAINE AND MENTHOL 5; 1 G/100ML; G/100ML
1 LIQUID ORAL ONCE
Refills: 0 | Status: COMPLETED | OUTPATIENT
Start: 2020-04-05 | End: 2020-04-05

## 2020-04-05 RX ADMIN — Medication 1 GRAM(S): at 17:56

## 2020-04-05 RX ADMIN — LACTULOSE 15 GRAM(S): 10 SOLUTION ORAL at 05:58

## 2020-04-05 RX ADMIN — Medication 1 GRAM(S): at 23:18

## 2020-04-05 RX ADMIN — Medication 1 GRAM(S): at 05:58

## 2020-04-05 RX ADMIN — Medication 1 GRAM(S): at 12:13

## 2020-04-05 RX ADMIN — BENZOCAINE AND MENTHOL 1 LOZENGE: 5; 1 LIQUID ORAL at 03:06

## 2020-04-05 RX ADMIN — Medication 1 TABLET(S): at 17:56

## 2020-04-05 RX ADMIN — Medication 1 GRAM(S): at 00:15

## 2020-04-05 RX ADMIN — PANTOPRAZOLE SODIUM 40 MILLIGRAM(S): 20 TABLET, DELAYED RELEASE ORAL at 05:58

## 2020-04-05 RX ADMIN — PANTOPRAZOLE SODIUM 40 MILLIGRAM(S): 20 TABLET, DELAYED RELEASE ORAL at 17:56

## 2020-04-05 NOTE — PROGRESS NOTE ADULT - ASSESSMENT
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, hospitalized x 4mths w/ paralysis), decompensated alcoholic cirrhosis (now sober, off transplant list at [facility] as improved, with small esophogeal varices last EGD 9/19 with hx esophageal bleed s/p banding 2018 on propanolol, hx of encephalopathy on lactulose, neg HCC w/u as of 4/19, no ascites of 10/19), porcelain gallbladder, known R rectus sheath mass, liver lesion and R renal pole lesions of unclear etiology, SMA stenosis and recent hospitalizations in 11/2019 for severe RUQ pain 2/2 pancreatitis from cholelithiasis/choledocholithiasis s/p ERCP with stone extraction and f/u hospitalization with plastic stent placement w/o spontaneous dissolution and hematochezia 2/2 hemorrhoids, colonic AVMs (s/p cauterization) who presented with melena and lower abdominal pain for one week, found to have non-bleeding duodenal ulcers on EGD on 4/2/20.     IMPRESSION  - Acute blood loss anemia with melena: Currently with no overt bleeding, HD stable, and with Hgb of 10 to 11. Underwent EGD 4/2/2020 with grade I esophageal varices and multiple non-bleeding duodenal ulcers with pigmented material treated with argon plasma coagulation (APC). Biopsies with no evidence of H. pylori.    - Decompensated alcoholic cirrhosis       Varices: small, hx esophageal bleed s/p banding 2018 on propanolol,        Encephalopathy: previously reported, on lactulose       HCC: negative workup CT 4/1/2020       Ascites: none CT 4/1/2020    Recommendations:  - No hepatology contraindication to discharge   - F/U AM CBC  - Continue PPI PO BID  - Continue Carafate  - Continue propanolol  - Continue lactulose  - Advance diet as tolerated  - Outpatient TeleHealth visit with Dr. Richie Mcgraw - we will set up follow-up via the Hepatology Office  - Rest of care per primary team    Crhis Joseph MD  Gastroenterology Fellow  Pager number:   [phone] (Long range pager)  [phone] ([facility] pager)  Pageable via [employer] at [facility] and [place]. 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, hospitalized x 4mths w/ paralysis), decompensated alcoholic cirrhosis (now sober, off transplant list at [facility] as improved, with small esophogeal varices last EGD 9/19 with hx esophageal bleed s/p banding 2018 on propanolol, hx of encephalopathy on lactulose, neg HCC w/u as of 4/19, no ascites of 10/19), porcelain gallbladder, known R rectus sheath mass, liver lesion and R renal pole lesions of unclear etiology, SMA stenosis and recent hospitalizations in 11/2019 for severe RUQ pain 2/2 pancreatitis from cholelithiasis/choledocholithiasis s/p ERCP with stone extraction and f/u hospitalization with plastic stent placement w/o spontaneous dissolution and hematochezia 2/2 hemorrhoids, colonic AVMs (s/p cauterization) who presented with melena and lower abdominal pain for one week, found to have non-bleeding duodenal ulcers on EGD on 4/2/20.     IMPRESSION  - Acute blood loss anemia with melena: Currently with no overt bleeding, HD stable, and with Hgb of 10 to 11. Underwent EGD 4/2/2020 with grade I esophageal varices and multiple non-bleeding duodenal ulcers with pigmented material treated with argon plasma coagulation (APC). Biopsies with no evidence of H. pylori.    - Decompensated alcoholic cirrhosis       Varices: small, hx esophageal bleed s/p banding 2018 on propanolol,        Encephalopathy: previously reported, on lactulose       HCC: negative workup CT 4/1/2020       Ascites: none CT 4/1/2020    Recommendations:  -  F/U  CBC later this afternoon  - Continue PPI PO BID  - Continue Carafate  - Continue propanolol  - Continue lactulose  - Advance diet as tolerated  - When discharged we will need Outpatient TeleHealth visit with our office - we will set up follow-up via the Hepatology Office  - Rest of care per primary team    Chris Joseph MD  Gastroenterology Fellow  Pager number:   [phone] (Long range pager)  [phone] ([facility] pager)  Pageable via [employer] at [facility] and [facility]. Select Tools --> On Call [facility]-ED --> Search for name  Also available on Microsoft Teams

## 2020-04-05 NOTE — PROGRESS NOTE ADULT - SUBJECTIVE AND OBJECTIVE BOX
Chief Complaint: Abdominal pain  Reason for consult: Melena    Interval Events:   - Patient endorsed dark colored bowel movements yesterday    Allergies:  No Known Allergies    MEDICATIONS  (STANDING):  calcium carbonate 1250 mG  + Vitamin D (OsCal 500 + D) 1 Tablet(s) Oral daily  lactulose Syrup 15 Gram(s) Oral two times a day  pantoprazole    Tablet 40 milliGRAM(s) Oral two times a day  propranolol 10 milliGRAM(s) Oral every 12 hours  sucralfate 1 Gram(s) Oral four times a day    MEDICATIONS  (PRN):  acetaminophen   Tablet .. 500 milliGRAM(s) Oral every 6 hours PRN Mild Pain (1 - 3), Moderate Pain (4 - 6), Severe Pain (7 - 10)    PMHX/PSHX:  Porcelain gallbladder  Liver cirrhosis  Guillain-[place] syndrome  HTN (hypertension)  H/O inguinal hernia repair    Family history:  Family history of ovarian cancer (Sibling)      ROS:     General:  No wt loss, fevers, chills, night sweats, fatigue,   Eyes:  Good vision, no reported pain  ENT:  No sore throat, pain, runny nose, dysphagia  CV:  No pain, palpitations, hypo/hypertension  Pulm:  No dyspnea, cough, tachypnea, wheezing  GI:  No pain, No nausea, No vomiting, No diarrhea, No constipation, No weight loss, No fever, No pruritis, No rectal bleeding, + dark stools, No dysphagia  :  No pain, bleeding, incontinence, nocturia  Muscle:  No pain, weakness  Neuro:  No weakness, tingling, + memory problems  Psych:  No fatigue, insomnia, mood problems, depression  Endocrine:  No polyuria, polydipsia, cold/heat intolerance  Heme:  No petechiae, ecchymosis, easy bruisability  Skin:  No rash, tattoos, scars, edema    PHYSICAL EXAM:   Vital Signs:  Vital Signs Last 24 Hrs  T(C): 35.8 (05 Apr 2020 05:56), Max: 37.1 (04 Apr 2020 17:21)  T(F): 96.5 (05 Apr 2020 05:56), Max: 98.8 (04 Apr 2020 17:21)  HR: 63 (05 Apr 2020 05:56) (57 - 69)  BP: 122/74 (05 Apr 2020 05:56) (120/71 - 132/74)  BP(mean): --  RR: 18 (05 Apr 2020 05:56) (16 - 18)  SpO2: 100% (05 Apr 2020 05:56) (98% - 100%)    GENERAL:  No acute distress  HEENT:  Normocephalic/atraumatic,  no scleral icterus  CHEST:  Normal effort, no accessory muscle use  ABDOMEN:  Soft, non-tender, non-distended, normoactive bowel sounds  EXTREMITIES:  No cyanosis, clubbing, or edema  SKIN:  No rash/erythema  NEURO:  Alert and oriented x 3, no asterixis     LABS:                        11.0   9.38  )-----------( 157      ( 04 Apr 2020 18:33 )             34.8     04-04    141  |  112<H>  |  33<H>  ----------------------------<  137<H>  4.4   |  19<L>  |  1.34<H>    Ca    8.4      04 Apr 2020 10:20  Phos  2.0     04-04  Mg     1.6     04-04    TPro  5.4<L>  /  Alb  2.6<L>  /  TBili  0.4  /  DBili  x   /  AST  24  /  ALT  15  /  AlkPhos  76  04-04    LIVER FUNCTIONS - ( 04 Apr 2020 10:20 )  Alb: 2.6 g/dL / Pro: 5.4 g/dL / ALK PHOS: 76 U/L / ALT: 15 U/L / AST: 24 U/L / GGT: x           PT/INR - ( 04 Apr 2020 12:45 )   PT: 16.8 sec;   INR: 1.46 ratio      Imaging:    No new imaging Chief Complaint: Abdominal pain  Reason for consult: Melena    Interval Events:   - Patient endorsed dark colored bowel movements yesterday    Allergies:  No Known Allergies    MEDICATIONS  (STANDING):  calcium carbonate 1250 mG  + Vitamin D (OsCal 500 + D) 1 Tablet(s) Oral daily  lactulose Syrup 15 Gram(s) Oral two times a day  pantoprazole    Tablet 40 milliGRAM(s) Oral two times a day  propranolol 10 milliGRAM(s) Oral every 12 hours  sucralfate 1 Gram(s) Oral four times a day    MEDICATIONS  (PRN):  acetaminophen   Tablet .. 500 milliGRAM(s) Oral every 6 hours PRN Mild Pain (1 - 3), Moderate Pain (4 - 6), Severe Pain (7 - 10)    PMHX/PSHX:  Porcelain gallbladder  Liver cirrhosis  Guillain-[place] syndrome  HTN (hypertension)  H/O inguinal hernia repair    Family history:  Family history of ovarian cancer (Sibling)      ROS:     General:  No wt loss, fevers, chills, night sweats, fatigue,   Eyes:  Good vision, no reported pain  ENT:  No sore throat, pain, runny nose, dysphagia  CV:  No pain, palpitations, hypo/hypertension  Pulm:  No dyspnea, cough, tachypnea, wheezing  GI:  No pain, No nausea, No vomiting, No diarrhea, No constipation, No weight loss, No fever, No pruritis, No rectal bleeding, + dark stools, No dysphagia  :  No pain, bleeding, incontinence, nocturia  Muscle:  No pain, weakness  Neuro:  No weakness, tingling, + memory problems  Psych:  No fatigue, insomnia, mood problems, depression  Endocrine:  No polyuria, polydipsia, cold/heat intolerance  Heme:  No petechiae, ecchymosis, easy bruisability  Skin:  No rash, tattoos, scars, edema    PHYSICAL EXAM:   Vital Signs:  Vital Signs Last 24 Hrs  T(C): 35.8 (05 Apr 2020 05:56), Max: 37.1 (04 Apr 2020 17:21)  T(F): 96.5 (05 Apr 2020 05:56), Max: 98.8 (04 Apr 2020 17:21)  HR: 63 (05 Apr 2020 05:56) (57 - 69)  BP: 122/74 (05 Apr 2020 05:56) (120/71 - 132/74)  BP(mean): --  RR: 18 (05 Apr 2020 05:56) (16 - 18)  SpO2: 100% (05 Apr 2020 05:56) (98% - 100%)    GENERAL:  No acute distress  HEENT:  Normocephalic/atraumatic,  no scleral icterus  CHEST:  Normal effort, no accessory muscle use  ABDOMEN:  Soft, non-tender, non-distended, normoactive bowel sounds  EXTREMITIES:  No cyanosis, clubbing, or edema  SKIN:  No rash/erythema  NEURO:  Alert and oriented x 3, no asterixis     LABS:                          9.1    6.55  )-----------( 125      ( 05 Apr 2020 07:26 )             28.9                           11.0   9.38  )-----------( 157      ( 04 Apr 2020 18:33 )             34.8     04-04    141  |  112<H>  |  33<H>  ----------------------------<  137<H>  4.4   |  19<L>  |  1.34<H>    Ca    8.4      04 Apr 2020 10:20  Phos  2.0     04-04  Mg     1.6     04-04    TPro  5.4<L>  /  Alb  2.6<L>  /  TBili  0.4  /  DBili  x   /  AST  24  /  ALT  15  /  AlkPhos  76  04-04    LIVER FUNCTIONS - ( 04 Apr 2020 10:20 )  Alb: 2.6 g/dL / Pro: 5.4 g/dL / ALK PHOS: 76 U/L / ALT: 15 U/L / AST: 24 U/L / GGT: x           PT/INR - ( 04 Apr 2020 12:45 )   PT: 16.8 sec;   INR: 1.46 ratio      Imaging:    No new imaging

## 2020-04-05 NOTE — PROGRESS NOTE ADULT - SUBJECTIVE AND OBJECTIVE BOX
Patient is a 80y old  Male who presents with a chief complaint of abdominal pain (05 Apr 2020 07:16)      SUBJECTIVE / OVERNIGHT EVENTS: c/o dark stool, weakness. Had difficulty swallowing pills yesterday.  Review of Systems  chest pain no  palpitations no  sob no  nausea no  headache no    MEDICATIONS  (STANDING):  calcium carbonate 1250 mG  + Vitamin D (OsCal 500 + D) 1 Tablet(s) Oral daily  lactulose Syrup 15 Gram(s) Oral two times a day  pantoprazole    Tablet 40 milliGRAM(s) Oral two times a day  propranolol 10 milliGRAM(s) Oral every 12 hours  sucralfate 1 Gram(s) Oral four times a day    MEDICATIONS  (PRN):  acetaminophen   Tablet .. 500 milliGRAM(s) Oral every 6 hours PRN Mild Pain (1 - 3), Moderate Pain (4 - 6), Severe Pain (7 - 10)      Vital Signs Last 24 Hrs  T(C): 35.8 (05 Apr 2020 05:56), Max: 37.1 (04 Apr 2020 17:21)  T(F): 96.5 (05 Apr 2020 05:56), Max: 98.8 (04 Apr 2020 17:21)  HR: 63 (05 Apr 2020 05:56) (63 - 69)  BP: 122/74 (05 Apr 2020 05:56) (120/71 - 130/78)  BP(mean): --  RR: 18 (05 Apr 2020 05:56) (16 - 18)  SpO2: 100% (05 Apr 2020 05:56) (99% - 100%)    PHYSICAL EXAM:  GENERAL: NAD  HEAD:  Atraumatic, Normocephalic  EYES: EOMI, PERRLA, conjunctiva and sclera clear  NECK: Supple, No JVD  CHEST/LUNG: Clear to auscultation bilaterally; No wheeze  HEART: Regular rate and rhythm; No murmurs, rubs, or gallops  ABDOMEN: Soft, Nontender, Nondistended; Bowel sounds present  EXTREMITIES:  2+ Peripheral Pulses, No clubbing, cyanosis, or edema  PSYCH: AAOx3  NEUROLOGY: non-focal  SKIN: No rashes or lesions    LABS:                        9.1    6.55  )-----------( 125      ( 05 Apr 2020 07:26 )             28.9     04-05    141  |  112<H>  |  24<H>  ----------------------------<  96  4.2   |  19<L>  |  1.33<H>    Ca    8.3<L>      05 Apr 2020 07:26  Phos  2.3     04-05  Mg     1.6     04-05    TPro  5.2<L>  /  Alb  2.7<L>  /  TBili  0.4  /  DBili  x   /  AST  21  /  ALT  13  /  AlkPhos  88  04-05    PT/INR - ( 04 Apr 2020 12:45 )   PT: 16.8 sec;   INR: 1.46 ratio                     RADIOLOGY & ADDITIONAL TESTS:    Imaging Personally Reviewed:    Consultant(s) Notes Reviewed:      Care Discussed with Consultants/Other Providers:

## 2020-04-05 NOTE — PROGRESS NOTE ADULT - ATTENDING COMMENTS
HgB decreased to 9.1. He is still complaining of melena. Hemodynamically stable. If his HgB continues to decrease, he will likely need repeat endoscopic evaluation. I agree with plan as above. I would check HgB later this afternoon and follow vital signs closely. I agree with plan as above

## 2020-04-05 NOTE — PROGRESS NOTE ADULT - ASSESSMENT
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    Anemia due to blood loss.   - s/p Tx  - HD stable  - PPI  - discontinue octreotide  - follow Hct    S/P EGD with duodenal ulcers  - diet as tolerated/ advance to regular and observe  - PPI  -Carafate  - GI follow  - monitor Hgb   - maintain Hgb>7.0, active T+S.   - may need repeat endoscopy    Lower abdominal pain.   -p/w bl lower abdominal pain  -no evidence of large stool burden, worsening biliary pathology on CT abd/pelvis  -rectus sheath mass appears stable  -possibly 2/2 persistent CBD stent?  -pain control: conservative tylenol use (given cirrhosis), can also try oxycodone for breakthrough if uncontrolled  -GI follow     Alcoholic cirrhosis of liver without ascites.  - decompensated ETOH cirrhosis w/ esophageal and rectal varices on propanolol, hx hepatic encephalopathy on lactulose, neg HCC w/u  - MELD-Na 16, stable  - c/w lactulose, propanolol  - Hepatology follow.   - discontinue Ceftriaxone    Hyperkalemia.   - K 6.3 ->5.6, now s/p humulin/D50 and 500cc bolus  - lokelma x1  - now resumed on lactulose  - appears to have stable CKD, unclear etiology of hyperkalemia   - no EKG changes, asymptomatic.     Essential hypertension.  - propanolol for varices  - BPs well controlled as inpt  - monitor daily.     CKD (chronic kidney disease), stage III.  - Cr appears to have worsened over the past year, unclear if new baseline (1.4-2.0)  - no e/o KAY  - monitor Cr daily  - avoid nephrotoxic agents  renally dose meds.  - gentle IVF    Covid negative  - tested at daughter request/ would not take patient home if not tested.    Preventive measure.   - DVT ppx: SCDs for now given bleed    DCP home if stable    Chapo Bennett MD pager [phone]

## 2020-04-06 ENCOUNTER — TRANSCRIPTION ENCOUNTER (OUTPATIENT)
Age: 80
End: 2020-04-06

## 2020-04-06 VITALS
HEART RATE: 56 BPM | TEMPERATURE: 97 F | RESPIRATION RATE: 18 BRPM | SYSTOLIC BLOOD PRESSURE: 134 MMHG | DIASTOLIC BLOOD PRESSURE: 73 MMHG | OXYGEN SATURATION: 99 %

## 2020-04-06 LAB
ANION GAP SERPL CALC-SCNC: 9 MMOL/L — SIGNIFICANT CHANGE UP (ref 5–17)
BUN SERPL-MCNC: 21 MG/DL — SIGNIFICANT CHANGE UP (ref 7–23)
CALCIUM SERPL-MCNC: 8.3 MG/DL — LOW (ref 8.4–10.5)
CHLORIDE SERPL-SCNC: 111 MMOL/L — HIGH (ref 96–108)
CO2 SERPL-SCNC: 22 MMOL/L — SIGNIFICANT CHANGE UP (ref 22–31)
CREAT SERPL-MCNC: 1.3 MG/DL — SIGNIFICANT CHANGE UP (ref 0.5–1.3)
GLUCOSE SERPL-MCNC: 84 MG/DL — SIGNIFICANT CHANGE UP (ref 70–99)
HCT VFR BLD CALC: 28.1 % — LOW (ref 39–50)
HGB BLD-MCNC: 9 G/DL — LOW (ref 13–17)
MAGNESIUM SERPL-MCNC: 1.6 MG/DL — SIGNIFICANT CHANGE UP (ref 1.6–2.6)
MCHC RBC-ENTMCNC: 28 PG — SIGNIFICANT CHANGE UP (ref 27–34)
MCHC RBC-ENTMCNC: 32 GM/DL — SIGNIFICANT CHANGE UP (ref 32–36)
MCV RBC AUTO: 87.3 FL — SIGNIFICANT CHANGE UP (ref 80–100)
NRBC # BLD: 0 /100 WBCS — SIGNIFICANT CHANGE UP (ref 0–0)
PHOSPHATE SERPL-MCNC: 2.3 MG/DL — LOW (ref 2.5–4.5)
PLATELET # BLD AUTO: 128 K/UL — LOW (ref 150–400)
POTASSIUM SERPL-MCNC: 4 MMOL/L — SIGNIFICANT CHANGE UP (ref 3.5–5.3)
POTASSIUM SERPL-SCNC: 4 MMOL/L — SIGNIFICANT CHANGE UP (ref 3.5–5.3)
RBC # BLD: 3.22 M/UL — LOW (ref 4.2–5.8)
RBC # FLD: 17.8 % — HIGH (ref 10.3–14.5)
SODIUM SERPL-SCNC: 142 MMOL/L — SIGNIFICANT CHANGE UP (ref 135–145)
WBC # BLD: 7.01 K/UL — SIGNIFICANT CHANGE UP (ref 3.8–10.5)
WBC # FLD AUTO: 7.01 K/UL — SIGNIFICANT CHANGE UP (ref 3.8–10.5)

## 2020-04-06 PROCEDURE — 86850 RBC ANTIBODY SCREEN: CPT

## 2020-04-06 PROCEDURE — 82962 GLUCOSE BLOOD TEST: CPT

## 2020-04-06 PROCEDURE — 83735 ASSAY OF MAGNESIUM: CPT

## 2020-04-06 PROCEDURE — 84100 ASSAY OF PHOSPHORUS: CPT

## 2020-04-06 PROCEDURE — 85610 PROTHROMBIN TIME: CPT

## 2020-04-06 PROCEDURE — 86901 BLOOD TYPING SEROLOGIC RH(D): CPT

## 2020-04-06 PROCEDURE — 82272 OCCULT BLD FECES 1-3 TESTS: CPT

## 2020-04-06 PROCEDURE — 85027 COMPLETE CBC AUTOMATED: CPT

## 2020-04-06 PROCEDURE — 36430 TRANSFUSION BLD/BLD COMPNT: CPT

## 2020-04-06 PROCEDURE — 96374 THER/PROPH/DIAG INJ IV PUSH: CPT | Mod: XU

## 2020-04-06 PROCEDURE — 80053 COMPREHEN METABOLIC PANEL: CPT

## 2020-04-06 PROCEDURE — 99285 EMERGENCY DEPT VISIT HI MDM: CPT | Mod: 25

## 2020-04-06 PROCEDURE — 80048 BASIC METABOLIC PNL TOTAL CA: CPT

## 2020-04-06 PROCEDURE — 84484 ASSAY OF TROPONIN QUANT: CPT

## 2020-04-06 PROCEDURE — 86923 COMPATIBILITY TEST ELECTRIC: CPT

## 2020-04-06 PROCEDURE — 71045 X-RAY EXAM CHEST 1 VIEW: CPT

## 2020-04-06 PROCEDURE — 74177 CT ABD & PELVIS W/CONTRAST: CPT

## 2020-04-06 PROCEDURE — 83605 ASSAY OF LACTIC ACID: CPT

## 2020-04-06 PROCEDURE — 88305 TISSUE EXAM BY PATHOLOGIST: CPT

## 2020-04-06 PROCEDURE — 99232 SBSQ HOSP IP/OBS MODERATE 35: CPT | Mod: GC

## 2020-04-06 PROCEDURE — 87635 SARS-COV-2 COVID-19 AMP PRB: CPT

## 2020-04-06 PROCEDURE — 93005 ELECTROCARDIOGRAM TRACING: CPT

## 2020-04-06 PROCEDURE — 86900 BLOOD TYPING SEROLOGIC ABO: CPT

## 2020-04-06 PROCEDURE — 88312 SPECIAL STAINS GROUP 1: CPT

## 2020-04-06 PROCEDURE — 85730 THROMBOPLASTIN TIME PARTIAL: CPT

## 2020-04-06 PROCEDURE — P9040: CPT

## 2020-04-06 RX ORDER — CHOLECALCIFEROL (VITAMIN D3) 125 MCG
0 CAPSULE ORAL
Qty: 0 | Refills: 0 | DISCHARGE

## 2020-04-06 RX ORDER — PANTOPRAZOLE SODIUM 20 MG/1
1 TABLET, DELAYED RELEASE ORAL
Qty: 60 | Refills: 0
Start: 2020-04-06 | End: 2020-05-05

## 2020-04-06 RX ORDER — ACETAMINOPHEN 500 MG
1 TABLET ORAL
Qty: 0 | Refills: 0 | DISCHARGE
Start: 2020-04-06

## 2020-04-06 RX ORDER — SUCRALFATE 1 G
1 TABLET ORAL
Qty: 120 | Refills: 0
Start: 2020-04-06 | End: 2020-05-05

## 2020-04-06 RX ADMIN — PANTOPRAZOLE SODIUM 40 MILLIGRAM(S): 20 TABLET, DELAYED RELEASE ORAL at 06:00

## 2020-04-06 RX ADMIN — Medication 1 GRAM(S): at 13:14

## 2020-04-06 RX ADMIN — Medication 1 GRAM(S): at 06:00

## 2020-04-06 RX ADMIN — LACTULOSE 15 GRAM(S): 10 SOLUTION ORAL at 06:00

## 2020-04-06 NOTE — PROGRESS NOTE ADULT - ASSESSMENT
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    Anemia due to blood loss.   - s/p Tx  - HD stable  - PPI  - off octreotide  - follow Hct    S/P EGD with duodenal ulcers  - diet as tolerated/ advance to regular and observe  - PPI  -Carafate  - GI follow  - monitor Hgb   - maintain Hgb>7.0, active T+S.   - no plan to repeat endoscopy. Cleared by GI for DC.    Lower abdominal pain.   -p/w bl lower abdominal pain  -no evidence of large stool burden, worsening biliary pathology on CT abd/pelvis  -rectus sheath mass appears stable  -possibly 2/2 persistent CBD stent? For stent removal as OTP  -pain control: conservative tylenol use (given cirrhosis), can also try oxycodone for breakthrough if uncontrolled  -GI follow     Alcoholic cirrhosis of liver without ascites.  - decompensated ETOH cirrhosis w/ esophageal and rectal varices on propanolol, hx hepatic encephalopathy on lactulose, neg HCC w/u  - MELD-Na 16, stable  - c/w lactulose, propanolol  - Hepatology follow.   - discontinue Ceftriaxone    Hyperkalemia.   - K 6.3 ->5.6, now s/p humulin/D50 and 500cc bolus  - lokelma x1  - now resumed on lactulose  - appears to have stable CKD, unclear etiology of hyperkalemia   - no EKG changes, asymptomatic.     Essential hypertension.  - propanolol for varices  - BPs well controlled as inpt  - monitor daily.     CKD (chronic kidney disease), stage III.  - Cr appears to have worsened over the past year, unclear if new baseline (1.4-2.0)  - no e/o KAY  - monitor Cr daily  - avoid nephrotoxic agents  renally dose meds.  - gentle IVF    Covid negative  - tested at daughter request/ would not take patient home if not tested.    Preventive measure.   - DVT ppx: SCDs for now given bleed    DC home. Follow with PMD/ Gastro/ Hepatiology in 3-4 days.     Chapo Bennett MD pager [phone]

## 2020-04-06 NOTE — PROGRESS NOTE ADULT - ASSESSMENT
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, hospitalized x 4mths w/ paralysis), decompensated alcoholic cirrhosis (now sober, off transplant list at [facility] as improved, with small esophogeal varices last EGD 9/19 with hx esophageal bleed s/p banding 2018 on propanolol, hx of encephalopathy on lactulose, neg HCC w/u as of 4/19, no ascites of 10/19), porcelain gallbladder, known R rectus sheath mass, liver lesion and R renal pole lesions of unclear etiology, SMA stenosis and recent hospitalizations in 11/2019 for severe RUQ pain 2/2 pancreatitis from cholelithiasis/choledocholithiasis s/p ERCP with stone extraction and f/u hospitalization with plastic stent placement w/o spontaneous dissolution and hematochezia 2/2 hemorrhoids, colonic AVMs (s/p cauterization) who presented with melena and lower abdominal pain for one week, found to have non-bleeding duodenal ulcers on EGD on 4/2/20.     IMPRESSION  - Acute blood loss anemia with melena: Currently with no overt bleeding, HD stable, and with Hgb ~ 9.0, however, fluctuating. Underwent EGD 4/2/2020 with grade I esophageal varices and multiple non-bleeding duodenal ulcers with pigmented material treated with argon plasma coagulation (APC). Biopsies with no evidence of H. pylori.    - Decompensated alcoholic cirrhosis       Varices: small, hx esophageal bleed s/p banding 2018 on propanolol,        Encephalopathy: previously reported, on lactulose       HCC: negative workup CT 4/1/2020       Ascites: none CT 4/1/2020    Recommendations:  - F/U AM CBC - if Hgb stable no Hepatology contraindication to discharge  - No plan for repeat upper endoscopy at this time  - Continue PPI PO BID  - Continue Carafate  - Continue propanolol  - Continue lactulose  - Advance diet as tolerated  - When discharged we will need Outpatient TeleHealth visit with our office - we will set up follow-up via the Hepatology Office  - Rest of care per primary team    Chris Joseph MD  Gastroenterology Fellow  Pager number:   [phone] (Long range pager)  [phone] ([facility] pager)  Pageable via [place] at [facility] and [facility]. 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, hospitalized x 4mths w/ paralysis), decompensated alcoholic cirrhosis (now sober, off transplant list at [facility] as improved, with small esophogeal varices last EGD 9/19 with hx esophageal bleed s/p banding 2018 on propanolol, hx of encephalopathy on lactulose, neg HCC w/u as of 4/19, no ascites of 10/19), porcelain gallbladder, known R rectus sheath mass, liver lesion and R renal pole lesions of unclear etiology, SMA stenosis and recent hospitalizations in 11/2019 for severe RUQ pain 2/2 pancreatitis from cholelithiasis/choledocholithiasis s/p ERCP with stone extraction and f/u hospitalization with plastic stent placement w/o spontaneous dissolution and hematochezia 2/2 hemorrhoids, colonic AVMs (s/p cauterization) who presented with melena and lower abdominal pain for one week, found to have non-bleeding duodenal ulcers on EGD on 4/2/20.     IMPRESSION  - Acute blood loss anemia with melena: Currently with no overt bleeding, HD stable, and with Hgb ~ 9.0, however, fluctuating. Underwent EGD 4/2/2020 with grade I esophageal varices and multiple non-bleeding duodenal ulcers with pigmented material treated with argon plasma coagulation (APC). Biopsies with no evidence of H. pylori.    - Decompensated alcoholic cirrhosis       Varices: small, hx esophageal bleed s/p banding 2018 on propanolol,        Encephalopathy: previously reported, on lactulose       HCC: negative workup CT 4/1/2020       Ascites: none CT 4/1/2020    Recommendations:  - F/U AM CBC - if Hgb stable no Hepatology contraindication to discharge  - No plan for repeat upper endoscopy at this time  - Continue PPI PO BID  - Continue Carafate  - Continue propanolol  - Continue lactulose  - Advance diet as tolerated  - When discharged we will need Outpatient TeleHealth visit with our office - we will set up follow-up via the Hepatology Office  - Rest of care per primary team    Service covered by Abbey Cyr today ([phone])    Chris Joseph MD  Gastroenterology Fellow  Pager number:   [phone] (Long range pager)  [phone] ([facility] pager)  Pageable via [place] at [facility] and [facility]. [employer] --> On Call [facility]-ED --> Search for name  Also available on Microsoft Teams

## 2020-04-06 NOTE — PROGRESS NOTE ADULT - ATTENDING COMMENTS
Hepatology Staff: Vera Bobo MD    I saw and examined the patient along with  Dr. Joseph 04-06-20 @ 11:18.    Patient Medical Record, hospital course was reviewed and summarized as below:    Vitals: Vital Signs Last 24 Hrs  T(C): 36.9 (06 Apr 2020 05:55), Max: 36.9 (06 Apr 2020 05:55)  T(F): 98.5 (06 Apr 2020 05:55), Max: 98.5 (06 Apr 2020 05:55)  HR: 57 (06 Apr 2020 05:55) (57 - 72)  BP: 126/57 (06 Apr 2020 05:55) (126/57 - 138/70)  RR: 196 (06 Apr 2020 05:55) (18 - 196)  SpO2: 99% (06 Apr 2020 05:55) (99% - 100%)    Labs:Creatinine, Serum: 1.30 mg/dL (04-06-20 @ 08:27)      I/O: I&O's Summary    05 Apr 2020 07:01  -  06 Apr 2020 07:00  --------------------------------------------------------  IN: 480 mL / OUT: 1200 mL / NET: -720 mL         Last 24 hour events: Stable H/H    Recommendations: Patient with bleeding DU, s/p EGD with APC. Stable vitals and Hb. Continue with Carafate 10 ml PO q 6hrs, PPI bid orally. Random gastric bx negative for H. Pylori. OK to discharge home with plan for outpatient follow up. PD stent need to be removed electively. Discussed with Dr. Staton, and he will set up an EGD as an outpatient.       Plan discussed with Primary team. Hepatology Staff: Vera Bobo MD    I reviewed the EMR of the patient along with  Dr. Joseph 04-06-20 @ 11:18.    Patient Medical Record, hospital course was reviewed and summarized as below:    Vitals: Vital Signs Last 24 Hrs  T(C): 36.9 (06 Apr 2020 05:55), Max: 36.9 (06 Apr 2020 05:55)  T(F): 98.5 (06 Apr 2020 05:55), Max: 98.5 (06 Apr 2020 05:55)  HR: 57 (06 Apr 2020 05:55) (57 - 72)  BP: 126/57 (06 Apr 2020 05:55) (126/57 - 138/70)  RR: 196 (06 Apr 2020 05:55) (18 - 196)  SpO2: 99% (06 Apr 2020 05:55) (99% - 100%)    Labs:Creatinine, Serum: 1.30 mg/dL (04-06-20 @ 08:27)      I/O: I&O's Summary    05 Apr 2020 07:01  -  06 Apr 2020 07:00  --------------------------------------------------------  IN: 480 mL / OUT: 1200 mL / NET: -720 mL         Last 24 hour events: Stable H/H    Recommendations: Patient with bleeding DU, s/p EGD with APC. Stable vitals and Hb. Continue with Carafate 10 ml PO q 6hrs, PPI bid orally. Random gastric bx negative for H. Pylori. OK to discharge home with plan for outpatient follow up. PD stent need to be removed electively. Discussed with Dr. Staton, and he will set up an EGD as an outpatient.       Plan discussed with Primary team.

## 2020-04-06 NOTE — DISCHARGE NOTE NURSING/CASE MANAGEMENT/SOCIAL WORK - NSDCFUADDAPPT_GEN_ALL_CORE_FT
Follow up with PMD 1-2 weeks after discharge  Follow up with Hepatology as outpatient on discharge (#[phone], [phone])

## 2020-04-06 NOTE — PROGRESS NOTE ADULT - SUBJECTIVE AND OBJECTIVE BOX
Patient is a 80y old  Male who presents with a chief complaint of Abdominal pain (06 Apr 2020 07:18)      SUBJECTIVE / OVERNIGHT EVENTS: Comfortable without new complaints.   Review of Systems  chest pain no  palpitations no  sob no  nausea no  headache no    MEDICATIONS  (STANDING):  calcium carbonate 1250 mG  + Vitamin D (OsCal 500 + D) 1 Tablet(s) Oral daily  lactulose Syrup 15 Gram(s) Oral two times a day  pantoprazole    Tablet 40 milliGRAM(s) Oral two times a day  propranolol 10 milliGRAM(s) Oral every 12 hours  sucralfate 1 Gram(s) Oral four times a day    MEDICATIONS  (PRN):  acetaminophen   Tablet .. 500 milliGRAM(s) Oral every 6 hours PRN Mild Pain (1 - 3), Moderate Pain (4 - 6), Severe Pain (7 - 10)      Vital Signs Last 24 Hrs  T(C): 36.3 (06 Apr 2020 12:00), Max: 36.9 (06 Apr 2020 05:55)  T(F): 97.3 (06 Apr 2020 12:00), Max: 98.5 (06 Apr 2020 05:55)  HR: 56 (06 Apr 2020 12:00) (56 - 72)  BP: 134/73 (06 Apr 2020 12:00) (126/57 - 138/70)  BP(mean): --  RR: 18 (06 Apr 2020 12:00) (18 - 196)  SpO2: 99% (06 Apr 2020 12:00) (99% - 100%)    PHYSICAL EXAM:  GENERAL: NAD, well-developed  HEAD:  Atraumatic, Normocephalic  EYES: EOMI, PERRLA, conjunctiva and sclera clear  NECK: Supple, No JVD  CHEST/LUNG: Clear to auscultation bilaterally; No wheeze  HEART: Regular rate and rhythm; No murmurs, rubs, or gallops  ABDOMEN: Soft, Nontender, Nondistended; Bowel sounds present  EXTREMITIES:  2+ Peripheral Pulses, No clubbing, cyanosis, or edema  PSYCH: AAOx3  NEUROLOGY: non-focal  SKIN: No rashes or lesions    LABS:                        9.0    7.01  )-----------( 128      ( 06 Apr 2020 08:27 )             28.1     04-06    142  |  111<H>  |  21  ----------------------------<  84  4.0   |  22  |  1.30    Ca    8.3<L>      06 Apr 2020 08:27  Phos  2.3     04-06  Mg     1.6     04-06    TPro  5.2<L>  /  Alb  2.7<L>  /  TBili  0.4  /  DBili  x   /  AST  21  /  ALT  13  /  AlkPhos  88  04-05    PT/INR - ( 04 Apr 2020 12:45 )   PT: 16.8 sec;   INR: 1.46 ratio                     RADIOLOGY & ADDITIONAL TESTS:    Imaging Personally Reviewed:    Consultant(s) Notes Reviewed:      Care Discussed with Consultants/Other Providers:

## 2020-04-06 NOTE — PROGRESS NOTE ADULT - REASON FOR ADMISSION
abdominal pain

## 2020-04-06 NOTE — PROGRESS NOTE ADULT - SUBJECTIVE AND OBJECTIVE BOX
Chief Complaint: Abdominal pain  Reason for consult: Melena    Interval Events:   - The patient has no new complaints this morning    Allergies:  No Known Allergies    MEDICATIONS  (STANDING):  calcium carbonate 1250 mG  + Vitamin D (OsCal 500 + D) 1 Tablet(s) Oral daily  lactulose Syrup 15 Gram(s) Oral two times a day  pantoprazole    Tablet 40 milliGRAM(s) Oral two times a day  propranolol 10 milliGRAM(s) Oral every 12 hours  sucralfate 1 Gram(s) Oral four times a day    MEDICATIONS  (PRN):  acetaminophen   Tablet .. 500 milliGRAM(s) Oral every 6 hours PRN Mild Pain (1 - 3), Moderate Pain (4 - 6), Severe Pain (7 - 10)    PMHX/PSHX:  Porcelain gallbladder  Liver cirrhosis  Guillain-[place] syndrome  HTN (hypertension)  H/O inguinal hernia repair    Family history:  Family history of ovarian cancer (Sibling)      ROS:     General:  No wt loss, fevers, chills, night sweats, fatigue,   Eyes:  Good vision, no reported pain  ENT:  No sore throat, pain, runny nose, dysphagia  CV:  No pain, palpitations, hypo/hypertension  Pulm:  No dyspnea, cough, tachypnea, wheezing  GI:  No pain, No nausea, No vomiting, No diarrhea, No constipation, No weight loss, No fever, No pruritis, No rectal bleeding, + dark stools, No dysphagia  :  No pain, bleeding, incontinence, nocturia  Muscle:  No pain, weakness  Neuro:  No weakness, tingling, + memory problems  Psych:  No fatigue, insomnia, mood problems, depression  Endocrine:  No polyuria, polydipsia, cold/heat intolerance  Heme:  No petechiae, ecchymosis, easy bruisability  Skin:  No rash, tattoos, scars, edema    PHYSICAL EXAM:   Vital Signs:  Vital Signs Last 24 Hrs  T(C): 36.9 (06 Apr 2020 05:55), Max: 36.9 (06 Apr 2020 05:55)  T(F): 98.5 (06 Apr 2020 05:55), Max: 98.5 (06 Apr 2020 05:55)  HR: 57 (06 Apr 2020 05:55) (57 - 72)  BP: 126/57 (06 Apr 2020 05:55) (126/57 - 138/70)  BP(mean): --  RR: 196 (06 Apr 2020 05:55) (18 - 196)  SpO2: 99% (06 Apr 2020 05:55) (99% - 100%)    GENERAL:  No acute distress  HEENT:  Normocephalic/atraumatic,  no scleral icterus  CHEST:  Normal effort, no accessory muscle use  ABDOMEN:  Soft, non-tender, non-distended, normoactive bowel sounds  EXTREMITIES:  No cyanosis, clubbing, or edema  SKIN:  No rash/erythema  NEURO:  Alert and oriented x 3, no asterixis     LABS:                        8.8    8.54  )-----------( 127      ( 05 Apr 2020 16:38 )             27.9     04-05    141  |  112<H>  |  24<H>  ----------------------------<  96  4.2   |  19<L>  |  1.33<H>    Ca    8.3<L>      05 Apr 2020 07:26  Phos  2.3     04-05  Mg     1.6     04-05    TPro  5.2<L>  /  Alb  2.7<L>  /  TBili  0.4  /  DBili  x   /  AST  21  /  ALT  13  /  AlkPhos  88  04-05    LIVER FUNCTIONS - ( 05 Apr 2020 07:26 )  Alb: 2.7 g/dL / Pro: 5.2 g/dL / ALK PHOS: 88 U/L / ALT: 13 U/L / AST: 21 U/L / GGT: x           PT/INR - ( 04 Apr 2020 12:45 )   PT: 16.8 sec;   INR: 1.46 ratio      Imaging:    No new imaging

## 2020-04-26 ENCOUNTER — INPATIENT (INPATIENT)
Facility: HOSPITAL | Age: 80
LOS: 5 days | Discharge: HOME | End: 2020-05-02
Attending: INTERNAL MEDICINE | Admitting: INTERNAL MEDICINE
Payer: MEDICARE

## 2020-04-26 VITALS
RESPIRATION RATE: 16 BRPM | HEART RATE: 93 BPM | TEMPERATURE: 99 F | SYSTOLIC BLOOD PRESSURE: 94 MMHG | DIASTOLIC BLOOD PRESSURE: 37 MMHG | OXYGEN SATURATION: 100 %

## 2020-04-26 DIAGNOSIS — K92.2 GASTROINTESTINAL HEMORRHAGE, UNSPECIFIED: ICD-10-CM

## 2020-04-26 DIAGNOSIS — Z29.9 ENCOUNTER FOR PROPHYLACTIC MEASURES, UNSPECIFIED: ICD-10-CM

## 2020-04-26 DIAGNOSIS — Z98.890 OTHER SPECIFIED POSTPROCEDURAL STATES: Chronic | ICD-10-CM

## 2020-04-26 DIAGNOSIS — N17.9 ACUTE KIDNEY FAILURE, UNSPECIFIED: ICD-10-CM

## 2020-04-26 DIAGNOSIS — D62 ACUTE POSTHEMORRHAGIC ANEMIA: ICD-10-CM

## 2020-04-26 DIAGNOSIS — K74.69 OTHER CIRRHOSIS OF LIVER: ICD-10-CM

## 2020-04-26 LAB
ALBUMIN SERPL ELPH-MCNC: 3 G/DL — LOW (ref 3.3–5)
ALP SERPL-CCNC: 75 U/L — SIGNIFICANT CHANGE UP (ref 40–120)
ALT FLD-CCNC: 10 U/L — SIGNIFICANT CHANGE UP (ref 4–41)
ANION GAP SERPL CALC-SCNC: 14 MMO/L — SIGNIFICANT CHANGE UP (ref 7–14)
ANISOCYTOSIS BLD QL: SIGNIFICANT CHANGE UP
APTT BLD: 31.2 SEC — SIGNIFICANT CHANGE UP (ref 27.5–36.3)
AST SERPL-CCNC: 18 U/L — SIGNIFICANT CHANGE UP (ref 4–40)
BASOPHILS # BLD AUTO: 0.09 K/UL — SIGNIFICANT CHANGE UP (ref 0–0.2)
BASOPHILS NFR BLD AUTO: 1.3 % — SIGNIFICANT CHANGE UP (ref 0–2)
BASOPHILS NFR SPEC: 0.9 % — SIGNIFICANT CHANGE UP (ref 0–2)
BILIRUB SERPL-MCNC: 0.2 MG/DL — SIGNIFICANT CHANGE UP (ref 0.2–1.2)
BLASTS # FLD: 0 % — SIGNIFICANT CHANGE UP (ref 0–0)
BLD GP AB SCN SERPL QL: NEGATIVE — SIGNIFICANT CHANGE UP
BUN SERPL-MCNC: 33 MG/DL — HIGH (ref 7–23)
CALCIUM SERPL-MCNC: 8.8 MG/DL — SIGNIFICANT CHANGE UP (ref 8.4–10.5)
CHLORIDE SERPL-SCNC: 107 MMOL/L — SIGNIFICANT CHANGE UP (ref 98–107)
CO2 SERPL-SCNC: 15 MMOL/L — LOW (ref 22–31)
CREAT SERPL-MCNC: 2.28 MG/DL — HIGH (ref 0.5–1.3)
ELLIPTOCYTES BLD QL SMEAR: SLIGHT — SIGNIFICANT CHANGE UP
EOSINOPHIL # BLD AUTO: 0.9 K/UL — HIGH (ref 0–0.5)
EOSINOPHIL NFR BLD AUTO: 12.6 % — HIGH (ref 0–6)
EOSINOPHIL NFR FLD: 11.4 % — HIGH (ref 0–6)
GIANT PLATELETS BLD QL SMEAR: PRESENT — SIGNIFICANT CHANGE UP
GLUCOSE SERPL-MCNC: 107 MG/DL — HIGH (ref 70–99)
HCT VFR BLD CALC: 17.7 % — CRITICAL LOW (ref 39–50)
HGB BLD-MCNC: 5.3 G/DL — CRITICAL LOW (ref 13–17)
HYPOCHROMIA BLD QL: SIGNIFICANT CHANGE UP
IMM GRANULOCYTES NFR BLD AUTO: 0.7 % — SIGNIFICANT CHANGE UP (ref 0–1.5)
INR BLD: 1.51 — HIGH (ref 0.88–1.17)
LIDOCAIN IGE QN: 29.7 U/L — SIGNIFICANT CHANGE UP (ref 7–60)
LYMPHOCYTES # BLD AUTO: 1.17 K/UL — SIGNIFICANT CHANGE UP (ref 1–3.3)
LYMPHOCYTES # BLD AUTO: 16.3 % — SIGNIFICANT CHANGE UP (ref 13–44)
LYMPHOCYTES NFR SPEC AUTO: 11.4 % — LOW (ref 13–44)
MACROCYTES BLD QL: SLIGHT — SIGNIFICANT CHANGE UP
MAGNESIUM SERPL-MCNC: 2.1 MG/DL — SIGNIFICANT CHANGE UP (ref 1.6–2.6)
MCHC RBC-ENTMCNC: 26 PG — LOW (ref 27–34)
MCHC RBC-ENTMCNC: 29.9 % — LOW (ref 32–36)
MCV RBC AUTO: 86.8 FL — SIGNIFICANT CHANGE UP (ref 80–100)
METAMYELOCYTES # FLD: 0 % — SIGNIFICANT CHANGE UP (ref 0–1)
MICROCYTES BLD QL: SLIGHT — SIGNIFICANT CHANGE UP
MONOCYTES # BLD AUTO: 0.57 K/UL — SIGNIFICANT CHANGE UP (ref 0–0.9)
MONOCYTES NFR BLD AUTO: 8 % — SIGNIFICANT CHANGE UP (ref 2–14)
MONOCYTES NFR BLD: 5.2 % — SIGNIFICANT CHANGE UP (ref 2–9)
MYELOCYTES NFR BLD: 0 % — SIGNIFICANT CHANGE UP (ref 0–0)
NEUTROPHIL AB SER-ACNC: 70.2 % — SIGNIFICANT CHANGE UP (ref 43–77)
NEUTROPHILS # BLD AUTO: 4.38 K/UL — SIGNIFICANT CHANGE UP (ref 1.8–7.4)
NEUTROPHILS NFR BLD AUTO: 61.1 % — SIGNIFICANT CHANGE UP (ref 43–77)
NEUTS BAND # BLD: 0 % — SIGNIFICANT CHANGE UP (ref 0–6)
NRBC # FLD: 0.03 K/UL — SIGNIFICANT CHANGE UP (ref 0–0)
OB PNL STL: POSITIVE — SIGNIFICANT CHANGE UP
OTHER - HEMATOLOGY %: 0 — SIGNIFICANT CHANGE UP
PHOSPHATE SERPL-MCNC: 3.3 MG/DL — SIGNIFICANT CHANGE UP (ref 2.5–4.5)
PLATELET # BLD AUTO: 272 K/UL — SIGNIFICANT CHANGE UP (ref 150–400)
PLATELET COUNT - ESTIMATE: NORMAL — SIGNIFICANT CHANGE UP
PMV BLD: 10.8 FL — SIGNIFICANT CHANGE UP (ref 7–13)
POIKILOCYTOSIS BLD QL AUTO: SLIGHT — SIGNIFICANT CHANGE UP
POLYCHROMASIA BLD QL SMEAR: SIGNIFICANT CHANGE UP
POTASSIUM SERPL-MCNC: 5.6 MMOL/L — HIGH (ref 3.5–5.3)
POTASSIUM SERPL-SCNC: 5.6 MMOL/L — HIGH (ref 3.5–5.3)
PROMYELOCYTES # FLD: 0 % — SIGNIFICANT CHANGE UP (ref 0–0)
PROT SERPL-MCNC: 6.2 G/DL — SIGNIFICANT CHANGE UP (ref 6–8.3)
PROTHROM AB SERPL-ACNC: 17.6 SEC — HIGH (ref 9.8–13.1)
RBC # BLD: 2.04 M/UL — LOW (ref 4.2–5.8)
RBC # FLD: 18.2 % — HIGH (ref 10.3–14.5)
RH IG SCN BLD-IMP: POSITIVE — SIGNIFICANT CHANGE UP
SCHISTOCYTES BLD QL AUTO: SLIGHT — SIGNIFICANT CHANGE UP
SODIUM SERPL-SCNC: 136 MMOL/L — SIGNIFICANT CHANGE UP (ref 135–145)
TARGETS BLD QL SMEAR: SLIGHT — SIGNIFICANT CHANGE UP
VARIANT LYMPHS # BLD: 0.9 % — SIGNIFICANT CHANGE UP
WBC # BLD: 7.16 K/UL — SIGNIFICANT CHANGE UP (ref 3.8–10.5)
WBC # FLD AUTO: 7.16 K/UL — SIGNIFICANT CHANGE UP (ref 3.8–10.5)

## 2020-04-26 PROCEDURE — 71045 X-RAY EXAM CHEST 1 VIEW: CPT | Mod: 26

## 2020-04-26 PROCEDURE — 86079 PHYS BLOOD BANK SERV AUTHRJ: CPT

## 2020-04-26 RX ORDER — ACETAMINOPHEN 500 MG
650 TABLET ORAL EVERY 6 HOURS
Refills: 0 | Status: DISCONTINUED | OUTPATIENT
Start: 2020-04-26 | End: 2020-04-28

## 2020-04-26 RX ORDER — PANTOPRAZOLE SODIUM 20 MG/1
40 TABLET, DELAYED RELEASE ORAL EVERY 12 HOURS
Refills: 0 | Status: DISCONTINUED | OUTPATIENT
Start: 2020-04-26 | End: 2020-04-27

## 2020-04-26 RX ORDER — CEFTRIAXONE 500 MG/1
1000 INJECTION, POWDER, FOR SOLUTION INTRAMUSCULAR; INTRAVENOUS ONCE
Refills: 0 | Status: COMPLETED | OUTPATIENT
Start: 2020-04-26 | End: 2020-04-26

## 2020-04-26 RX ORDER — CEFTRIAXONE 500 MG/1
1000 INJECTION, POWDER, FOR SOLUTION INTRAMUSCULAR; INTRAVENOUS EVERY 24 HOURS
Refills: 0 | Status: DISCONTINUED | OUTPATIENT
Start: 2020-04-27 | End: 2020-04-28

## 2020-04-26 RX ORDER — SODIUM CHLORIDE 9 MG/ML
1000 INJECTION INTRAMUSCULAR; INTRAVENOUS; SUBCUTANEOUS ONCE
Refills: 0 | Status: COMPLETED | OUTPATIENT
Start: 2020-04-26 | End: 2020-04-26

## 2020-04-26 RX ORDER — OCTREOTIDE ACETATE 200 UG/ML
50 INJECTION, SOLUTION INTRAVENOUS; SUBCUTANEOUS EVERY 6 HOURS
Refills: 0 | Status: DISCONTINUED | OUTPATIENT
Start: 2020-04-26 | End: 2020-04-27

## 2020-04-26 RX ORDER — LACTULOSE 10 G/15ML
15 SOLUTION ORAL
Refills: 0 | Status: DISCONTINUED | OUTPATIENT
Start: 2020-04-26 | End: 2020-04-28

## 2020-04-26 RX ORDER — PANTOPRAZOLE SODIUM 20 MG/1
80 TABLET, DELAYED RELEASE ORAL ONCE
Refills: 0 | Status: COMPLETED | OUTPATIENT
Start: 2020-04-26 | End: 2020-04-26

## 2020-04-26 RX ORDER — SUCRALFATE 1 G
1 TABLET ORAL
Refills: 0 | Status: DISCONTINUED | OUTPATIENT
Start: 2020-04-26 | End: 2020-04-28

## 2020-04-26 RX ORDER — OCTREOTIDE ACETATE 200 UG/ML
50 INJECTION, SOLUTION INTRAVENOUS; SUBCUTANEOUS ONCE
Refills: 0 | Status: COMPLETED | OUTPATIENT
Start: 2020-04-26 | End: 2020-04-26

## 2020-04-26 RX ADMIN — OCTREOTIDE ACETATE 50 MICROGRAM(S): 200 INJECTION, SOLUTION INTRAVENOUS; SUBCUTANEOUS at 21:25

## 2020-04-26 RX ADMIN — SODIUM CHLORIDE 1000 MILLILITER(S): 9 INJECTION INTRAMUSCULAR; INTRAVENOUS; SUBCUTANEOUS at 15:28

## 2020-04-26 RX ADMIN — CEFTRIAXONE 100 MILLIGRAM(S): 500 INJECTION, POWDER, FOR SOLUTION INTRAMUSCULAR; INTRAVENOUS at 17:00

## 2020-04-26 RX ADMIN — SODIUM CHLORIDE 1000 MILLILITER(S): 9 INJECTION INTRAMUSCULAR; INTRAVENOUS; SUBCUTANEOUS at 14:21

## 2020-04-26 RX ADMIN — PANTOPRAZOLE SODIUM 80 MILLIGRAM(S): 20 TABLET, DELAYED RELEASE ORAL at 17:00

## 2020-04-26 NOTE — H&P ADULT - NSICDXPASTMEDICALHX_GEN_ALL_CORE_FT
PAST MEDICAL HISTORY:  Guillain-[place] syndrome     HTN (hypertension)     Liver cirrhosis     Porcelain gallbladder

## 2020-04-26 NOTE — H&P ADULT - HISTORY OF PRESENT ILLNESS
A 79 year old Male patient w/ PMH of HTN, CKD3, GERD with Dyspepsia/H. Pylori (10/2019), distant GBS (1996, hospitalized [id number] w/ paralysis), Decompensated Alcoholic Cirrhosis (now sober, off transplant list at [facility] as improved, w/ Small Grade 1 Esophogeal Varices (EGD 9/19) w/ hx Esophageal Bleeding s/p banding 2018 on Propanolol, hx of Hepatic Encephalopathy on Lactulose, Negative HCC w/u as of 4/19, no ascites of 10/19), Porcelain Gallbladder, known R Rectus Sheath Mass, Liver lesion and R Renal pole lesions of unclear etiology, SMA stenosis and recent hospitalizations (11/2019) for severe RUQ pain 2/2 pancreatitis from cholelithiasis/choledocholithiasis s/p ERCP with stone extraction and f/u hospitalization with plastic stent placement w/o spontaneous dissolution and hematochezia 2/2 hemorrhoids, colonic AVMs (s/p cauterization), and on 4/2/2020 for another GI bleed.     Patient was recently discharged on 4/6 who presented with black stools since discharge now with worsening generalized weakness at home over last few days to the point where he is unable to ambulate. Also, reports occasional lightheadedness, no falls, LOC, or head injury. No recent fevers, flu like symptoms, cough, shortness of breath, chest pain, palpitations, nausea, vomiting, or urinary symptoms. No blood thinners, like AC, or ASA/Plavix at home. A 79 year old Male patient w/ PMH of HTN, CKD3, GERD with Dyspepsia/H. Pylori (10/2019), distant GBS (1996, hospitalized [id number] w/ paralysis), Decompensated Alcoholic Cirrhosis (now sober, off transplant list at [facility] as improved, w/ Small Grade 1 Esophogeal Varices (EGD 9/19) w/ hx Esophageal Bleeding s/p banding 2018 on Propanolol, hx of Hepatic Encephalopathy on Lactulose, Negative HCC w/u as of 4/19, no ascites of 10/19), Porcelain Gallbladder, known R Rectus Sheath Mass, Liver lesion and R Renal pole lesions of unclear etiology, SMA stenosis and recent hospitalizations (11/2019) for severe RUQ pain 2/2 pancreatitis from cholelithiasis/choledocholithiasis s/p ERCP with stone extraction and f/u hospitalization with plastic stent placement w/o spontaneous dissolution and hematochezia 2/2 hemorrhoids, colonic AVMs (s/p cauterization), and on 4/2/2020 for another GI bleed.     Patient was recently discharged on 4/6 who presented with black stools since discharge now with worsening generalized weakness at home over last few days to the point where he is unable to ambulate. Also, reports occasional lightheadedness, no falls, LOC, or head injury. No recent fevers, flu like symptoms, cough, shortness of breath, chest pain, palpitations, nausea, vomiting, or urinary symptoms. No blood thinners, like AC, or ASA/Plavix at home.    Extended Triage:    Vital Signs:  · BP Systolic	  94 mm Hg	  · BP Diastolic	  37 mm Hg	  · Heart Rate	93 /min	  · Respiration Rate (breaths/min)	16 /min	  · Temp (F)	98.9 Degrees F	  · Temp (C)	37.2 Degrees C	  · Temp site	oral	  · SpO2 (%)	100 %	  · O2 Delivery/Oxygen Delivery Method	room air	     Administered STAT Bolus 1L NS, Octreotide 50mcg x1, PPI 80mg x1, Ceftriaxone 1g x1,

## 2020-04-26 NOTE — CONSULT NOTE ADULT - ASSESSMENT
78 yo M with PMHx of HTN, CKD3, GERD with Dyspepsia/H. Pylori (10/2019), distant GBS (1996, hospitalized [id number] w/ paralysis), decompensated ETOH Cirrhosis (now sober and improved off transplant list at [facility] as improved) cb small Grade 1 Esophogeal Varices (EGD 9/19, hx of esophageal bleed s/p banding 2018 on propanolol) and encephalopathy, porcelain gallbladder now presenting with melena since discharge on 4/6 now with worsening weakness at home.    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 Hb of 5.3 on admission. EGD on previous admission showing grade 1 duodenal ulcers, multiple duodenal ulcers with pigmented material s/p APCs. INR of 1.5 and plts >200.  DDx: PUD vs. AVM vs. gastritis vs. esophagitis vs. dieulofoy lesion  2) Decompensated alcoholic cirrhosis  MELD Na 9       Varices: small, hx esophageal bleed s/p banding 2018 on propanolol,        Encephalopathy: previously reported, on lactulose       HCC: negative workup CT 4/1/2020       Ascites: none CT 4/1/2020  3) Hyperkalemia  K of 5.6  4) KAY  Cr of 2.28 from Cr of 1.3 on 4/6/2020    - PPI gtt  - octreotide gtt  - continue with home dose of lactulose  - transfuse with goal Hb of >7  - please keep NPO for now, will discuss with attending in the AM  - please check COVID PCR 80 yo M with PMHx of HTN, CKD3, GERD with Dyspepsia/H. Pylori (10/2019), distant GBS (1996, hospitalized [id number] w/ paralysis), decompensated ETOH Cirrhosis (now sober and improved off transplant list at [facility] as improved) cb small Grade 1 Esophogeal Varices (EGD 9/19, hx of esophageal bleed s/p banding 2018 on propanolol) and encephalopathy, porcelain gallbladder now presenting with melena since discharge on 4/6 now with worsening weakness at home.    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 Hb of 5.3 on admission. EGD on previous admission showing grade 1 duodenal ulcers, multiple duodenal ulcers with pigmented material s/p APCs. INR of 1.5 and plts >200.  DDx: PUD vs. AVM vs. gastritis vs. esophagitis vs. dieulofoy lesion  2) Decompensated alcoholic cirrhosis  MELD Na 9       Varices: small, hx esophageal bleed s/p banding 2018 on propanolol,        Encephalopathy: previously reported, on lactulose       HCC: negative workup CT 4/1/2020       Ascites: none CT 4/1/2020  3) Hyperkalemia  K of 5.6  4) KAY  Cr of 2.28 from Cr of 1.3 on 4/6/2020    - PPI gtt  - octreotide gtt  - continue with home dose of lactulose  - please give ceftrixone 1g x 7 days  - hold propranolol for now  - transfuse with goal Hb of >7  - please keep NPO for now, will discuss with attending in the AM  - please check COVID PCR

## 2020-04-26 NOTE — H&P ADULT - NSHPLABSRESULTS_GEN_ALL_CORE
LABS: reviewed                         5.3    7.16  )-----------( 272      ( 26 Apr 2020 14:00 )             17.7     04-26    136  |  107  |  33<H>  ----------------------------<  107<H>  5.6<H>   |  15<L>  |  2.28<H>    Ca    8.8      26 Apr 2020 14:00  Phos  3.3     04-26  Mg     2.1     04-26    TPro  6.2  /  Alb  3.0<L>  /  TBili  0.2  /  DBili  x   /  AST  18  /  ALT  10  /  AlkPhos  75  04-26    PT/INR - ( 26 Apr 2020 14:00 )   PT: 17.6 SEC;   INR: 1.51       PTT - ( 26 Apr 2020 14:00 )  PTT:31.2 SEC    < from: Xray Chest 1 View- PORTABLE-Urgent (04.26.20 @ 15:17) >  EXAM:  XR CHEST PORTABLE URGENT 1V    PROCEDURE DATE:  Apr 26 2020   INTERPRETATION:  CLINICAL INDICATION: chest/abdominal pain  EXAM:  Portable frontal chest from 4/26/2020 1517. Compared to prior study from 4/4/2020.  Projection lordotic.  IMPRESSION:  No subdiaphragmatic free air or abnormally dilated bowel loops in the visualized upper abdomen.  Clear lungs. No pleural effusions or pneumothorax.  Cardiac and mediastinal silhouettes within normal limits for the projection.  Trachea midline.  Unremarkable osseous structures.  DISCRETE X-RAY DATA:  Percent of LEFT lung opacification: Clear  Percent of RIGHT lung opacification: Clear  Change in lung opacification from most recent x-ray (<=3 days): No Prior  Change from prior dated 3 or more days (same admission): No Prior LABS: reviewed                         5.3    7.16  )-----------( 272      ( 26 Apr 2020 14:00 )             17.7     04-26    136  |  107  |  33<H>  ----------------------------<  107<H>  5.6<H>   |  15<L>  |  2.28<H>    Ca    8.8      26 Apr 2020 14:00  Phos  3.3     04-26  Mg     2.1     04-26    TPro  6.2  /  Alb  3.0<L>  /  TBili  0.2  /  DBili  x   /  AST  18  /  ALT  10  /  AlkPhos  75  04-26    PT/INR - ( 26 Apr 2020 14:00 )   PT: 17.6 SEC;   INR: 1.51       PTT - ( 26 Apr 2020 14:00 )  PTT:31.2 SEC    < from: Xray Chest 1 View- PORTABLE-Urgent (04.26.20 @ 15:17) >  EXAM:  XR CHEST PORTABLE URGENT 1V    PROCEDURE DATE:  Apr 26 2020   INTERPRETATION:  CLINICAL INDICATION: chest/abdominal pain  EXAM:  Portable frontal chest from 4/26/2020 1517. Compared to prior study from 4/4/2020.  Projection lordotic.  IMPRESSION:  No subdiaphragmatic free air or abnormally dilated bowel loops in the visualized upper abdomen.  Clear lungs. No pleural effusions or pneumothorax.  Cardiac and mediastinal silhouettes within normal limits for the projection.  Trachea midline.  Unremarkable osseous structures.  DISCRETE X-RAY DATA:  Percent of LEFT lung opacification: Clear  Percent of RIGHT lung opacification: Clear  Change in lung opacification from most recent x-ray (<=3 days): No Prior  Change from prior dated 3 or more days (same admission): No Prior    < from: Upper Endoscopy (04.02.20 @ 12:21) >  Impression:       - Grade I esophageal varices.  - Normal stomach. Biopsied.  - Multiple non-bleeding duodenal ulcers with pigmented material.   - Treated with argon plasma coagulation (APC).  Recommendation:       - Observe patient in GI recovery unit for ongoing care.  - Clear liquid diet today.  - Use Protonix (pantoprazole) 40 mg IV daily bid.  - Discontinue IV Octreotiode.  - Use sucralfate tablets 1 gram PO QID. LABS: reviewed     COVID PCR   NEGATIVE  05-Apr-2020 17:45  [facility]                    5.3    7.16  )-----------( 272      ( 26 Apr 2020 14:00 )             17.7     04-26    136  |  107  |  33<H>  ----------------------------<  107<H>  5.6<H>   |  15<L>  |  2.28<H>    Ca    8.8      26 Apr 2020 14:00  Phos  3.3     04-26  Mg     2.1     04-26    TPro  6.2  /  Alb  3.0<L>  /  TBili  0.2  /  DBili  x   /  AST  18  /  ALT  10  /  AlkPhos  75  04-26    PT/INR - ( 26 Apr 2020 14:00 )   PT: 17.6 SEC;   INR: 1.51       PTT - ( 26 Apr 2020 14:00 )  PTT:31.2 SEC    < from: Xray Chest 1 View- PORTABLE-Urgent (04.26.20 @ 15:17) >  EXAM:  XR CHEST PORTABLE URGENT 1V    PROCEDURE DATE:  Apr 26 2020   INTERPRETATION:  CLINICAL INDICATION: chest/abdominal pain  EXAM:  Portable frontal chest from 4/26/2020 1517. Compared to prior study from 4/4/2020.  Projection lordotic.  IMPRESSION:  No subdiaphragmatic free air or abnormally dilated bowel loops in the visualized upper abdomen.  Clear lungs. No pleural effusions or pneumothorax.  Cardiac and mediastinal silhouettes within normal limits for the projection.  Trachea midline.  Unremarkable osseous structures.  DISCRETE X-RAY DATA:  Percent of LEFT lung opacification: Clear  Percent of RIGHT lung opacification: Clear  Change in lung opacification from most recent x-ray (<=3 days): No Prior  Change from prior dated 3 or more days (same admission): No Prior    < from: Upper Endoscopy (04.02.20 @ 12:21) >  Impression:       - Grade I esophageal varices.  - Normal stomach. Biopsied.  - Multiple non-bleeding duodenal ulcers with pigmented material.   - Treated with argon plasma coagulation (APC).  Recommendation:       - Observe patient in GI recovery unit for ongoing care.  - Clear liquid diet today.  - Use Protonix (pantoprazole) 40 mg IV daily bid.  - Discontinue IV Octreotiode.  - Use sucralfate tablets 1 gram PO QID. LABS: reviewed     COVID PCR   NEGATIVE  05-Apr-2020 17:45  [facility]                    5.3    7.16  )-----------( 272      ( 26 Apr 2020 14:00 )             17.7     04-26    136  |  107  |  33<H>  ----------------------------<  107<H>  5.6<H>   |  15<L>  |  2.28<H>    Ca    8.8      26 Apr 2020 14:00  Phos  3.3     04-26  Mg     2.1     04-26    TPro  6.2  /  Alb  3.0<L>  /  TBili  0.2  /  DBili  x   /  AST  18  /  ALT  10  /  AlkPhos  75  04-26    PT/INR - ( 26 Apr 2020 14:00 )   PT: 17.6 SEC;   INR: 1.51       PTT - ( 26 Apr 2020 14:00 )  PTT:31.2 SEC    < from: Xray Chest 1 View- PORTABLE-Urgent (04.26.20 @ 15:17) >  EXAM:  XR CHEST PORTABLE URGENT 1V    PROCEDURE DATE:  Apr 26 2020   INTERPRETATION:  CLINICAL INDICATION: chest/abdominal pain  EXAM:  Portable frontal chest from 4/26/2020 1517. Compared to prior study from 4/4/2020.  Projection lordotic.  IMPRESSION:  No subdiaphragmatic free air or abnormally dilated bowel loops in the visualized upper abdomen.  Clear lungs. No pleural effusions or pneumothorax.  Cardiac and mediastinal silhouettes within normal limits for the projection.  Trachea midline.  Unremarkable osseous structures.  DISCRETE X-RAY DATA:  Percent of LEFT lung opacification: Clear  Percent of RIGHT lung opacification: Clear  Change in lung opacification from most recent x-ray (<=3 days): No Prior  Change from prior dated 3 or more days (same admission): No Prior    < from: Upper Endoscopy (04.02.20 @ 12:21) >  Impression:       - Grade I esophageal varices.  - Normal stomach. Biopsied.  - Multiple non-bleeding duodenal ulcers with pigmented material.   - Treated with argon plasma coagulation (APC).

## 2020-04-26 NOTE — ED PROVIDER NOTE - OBJECTIVE STATEMENT
79M w/ HTN, CKD3, GERD, decompensated alcoholic cirrhosis, esophgeal varices last EGD 4/20/2020 showing Grade I varices GE junction, hx esophageal bleed s/p banding 2018 on propanolol, hx of encephalopathy on lactulose, neg HCC w/u as of 4/19, SMA stenosis, recent hospitalization for GI bleed April 1-6 2020 discharged 4/6 presents with black stools since discharge now with worsening generalized weakness at home over last few days to the point where he cannot walk. +occasional lightheadedness, no falls or head injury. No fevers. No blood thinners. Denies cp, palpitations, abd pain, n/v/d, urinary symptoms.

## 2020-04-26 NOTE — H&P ADULT - NSHPSOCIALHISTORY_GEN_ALL_CORE
Patient lives with wife and daughters, is a former .   At baseline ambulates with walker, dependent on family for assistance with ADLs.   Able to feed, bathe on own. Last ETOH use was 5/2018.   No hx tobacco or rec drug use.

## 2020-04-26 NOTE — H&P ADULT - PROBLEM SELECTOR PLAN 4
- DVT ppx: no chemic ppx due to active GI bleeding, SCDs for now.   - Diet: NPO for now until GI evaluation.   - Activity: as tolerated.   - Dispo: pending hospital course. - DVT ppx: no chemic ppx due to active GI bleeding, SCDs for now.   - Diet: NPO for now until GI evaluation.   - Activity: as tolerated.   - Dispo: pending hospital course.  - Tested negative April 5, for COVID, now no symptoms or lab abnormalities.

## 2020-04-26 NOTE — CONSULT NOTE ADULT - ATTENDING COMMENTS
Patient was seen and examined with the patient on the morning of Monday April 27. I reviewed his case and discussed with him.   I spoke with GI. Plan is for EGD tomorrow. He can have clear liquids today. NPO post midnight

## 2020-04-26 NOTE — ED ADULT NURSE NOTE - PAIN RATING/NUMBER SCALE (0-10): REST
In order to meet Medicare requirements, the clinical documentation must support the information cited in the admission order.  Please be sure to provide detailed and clear documentation about the following in the admitting note/history and physical:
0

## 2020-04-26 NOTE — ED PROVIDER NOTE - INTERPRETATION
MEDICARE WELLNESS VISIT NOTE      HISTORY OF PRESENT ILLNESS:   Chanda Britt presents for her Subsequent Annual Medicare Wellness Visit.   She has complaints or concerns which include losing weight, according to caregiver, with good appetite and food supply.     Patient Care Team:  Loraine Ybarra MD as PCP - General (Internal Medicine)  Dr. Villatoro (Psychiatry)  Dr. Wilver Fowler (Pain Management)   (Optometry)   (Dentistry)        Patient Active Problem List   Diagnosis   • Hyperlipidemia   • HTN (hypertension)   • Allergic rhinitis, cause unspecified   • Anxiety disorder   • Asthma   • COPD (chronic obstructive pulmonary disease) (CMS/[facility]ly [facility])   • DJD (degenerative joint disease)   • Hyperglycemia   • Metabolic disorder   • Mental retardation   • Scoliosis of thoracic spine   • CVA, old, dysphagia   • Hyponatremia   • Medicare annual wellness visit, initial   • Osteoporosis, unspecified   • Pernicious anemia   • A-fib (CMS/[facility]ly [facility])   • Pneumonia   • Bilateral shoulder pain   • Medicare annual wellness visit, subsequent   • Absolute anemia   • Cardiomegaly   • Chronic diastolic heart failure (CMS/[facility])   • Mitral valve insufficiency         Past Medical History:   Diagnosis Date   • Anemia    • Asthma    • Back pain    • COPD (chronic obstructive pulmonary disease) (CMS/Formerly [facility])    • CVA (cerebral vascular accident) (CMS/Formerly [facility])    • HTN (hypertension)          Past Surgical History:   Procedure Laterality Date   • Colonoscopy diagnostic  06/23/2011    Colonoscopy, Dx   • Esophagogastroduodenoscopy transoral flex w/bx single or mult  06/23/2011    EGD with Bx   • Eye surgery     • Hysterectomy     • Joint replacement           Social History     Tobacco Use   • Smoking status: Never Smoker   • Smokeless tobacco: Never Used   Substance Use Topics   • Alcohol use: No     Alcohol/week: 0.0 oz   • Drug use: No     Drug use:    Drug Use:    No              Family History   Problem Relation Age of Onset   • Heart  disease Mother         congestive heart disease   • Cancer Father         cancer of throat   • Heart disease Father    • Other Sister         aneurysm   • Cancer Sister    • Diabetes Sister    • Hypertension Sister    • Stroke Sister    • Thyroid Sister        Current Outpatient Medications   Medication Sig Dispense Refill   • isosorbide mononitrate (IMDUR) 30 MG 24 hr tablet TAKE 1 TABLET BY MOUTH EVERY DAY 90 tablet 0   • ELIQUIS 2.5 MG Tab TAKE 1 TABLET BY MOUTH EVERY 12 HOURS 180 tablet 0   • ALPRAZolam (XANAX) 0.25 MG tablet TAKE 1 TABLET BY MOUTH THREE TIMES A DAY 30 tablet 0   • cyanocobalamin 1000 MCG/ML injection INJECT 1 ML IN THE MUSCLE EVERY 30 DAYS 3 mL 0   • hydrochlorothiazide (MICROZIDE) 12.5 MG capsule TAKE 1 CAPSULE BY MOUTH EVERY DAY 90 capsule 0   • lisinopril (ZESTRIL) 20 MG tablet TAKE 1 TABLET BY MOUTH EVERY DAY 90 tablet 0   • potassium chloride (KLOR-CON) 20 MEQ packet DISSOLVE 2 PACKETS IN LIQUID AND TAKE AS DIRECTED DAILY 180 packet 0   • NIFEdipine XL (PROCARDIA XL) 30 MG 24 hr tablet TAKE 1 TABLET BY MOUTH EVERY DAY 90 tablet 0   • metoPROLOL succinate (TOPROL-XL) 25 MG 24 hr tablet Take 1 tablet by mouth daily. 90 tablet 1   • Syringe/Needle, Disp, 25G X 1\" 3 ML Misc Use with B12 injection. 12 each 0   • acetaminophen (TYLENOL) 500 MG tablet Take 500 mg by mouth 2 times daily.     • Daily Multiple Vitamins TABS Take 1 tablet by mouth daily.     • Carboxymethylcellulose Sodium (THERATEARS) 0.25 % SOLN 1 drop both eyes daily.     • hydrALAZINE (APRESOLINE) 50 MG tablet Take 1 tablet by mouth daily as needed (BP above 160). 30 tablet 1   • Psyllium (METAMUCIL PO) Take by mouth daily. Powder     • nystatin (MYCOSTATIN) cream Apply topically as needed for Dry Skin. 30 g 1   • nystatin (MYCOSTATIN) powder Apply to both breast fold topically two times a day for rashes or redness 15 g 0   • simvastatin (ZOCOR) 40 MG tablet Take 1 tablet by mouth nightly. 90 tablet 3     No current  facility-administered medications for this visit.         The following items on the Medicare Health Risk Assessment were found to be positive  4.) During the past 4 weeks, what was the hardest physical activity you could do for at least 2 minutes?:  Light     6 b.) How many servings of High Fiber / Whole Grain Foods to you have each day ( 1 serving = 1 cup cold cereal, 1/2 cup cooked cereal, 1 slice bread):  1 per day     11a.) Bladder Control problems (urine leaking):  Always     11d.) Bodily pain:  Often     11h.) Problems with your hearing:  Always     11i.) Problems using the telephone:  Always     12.) Do you need help with any of the following activities?   (check all that apply):  Bathing, Dressing and grooming     13.) Do you need help with any of the following activities?:  Get to places outside of walking distance (can't drive alone, or take a bus/taxi alone), Go shopping for groceries or clothes, Do your housework or laundry, Prepare a meal, Handle your own money     The patient has all help she needs available. Relatives provide transportation she has caregiver for 5 days severe week and she has home health to monitor her vitals, blood test and medication.  The patient has hearing aid.  Due to mental retardation and now progressive dementia requiring more assistance and supervision.  Still does exercise.  Vision and Hearing screens: performed and reviewed    Advance Directive:   The patient has the following documents:  Guardianship  Power of  for Health Care    Cognitive Assessment: preexisting cognitive issues - stable    Recent PHQ 2/9 Score    PHQ 2:  Date Adult PHQ 2 Score   4/5/2019 0       PHQ 9:  Date Adult PHQ 9 Score   3/30/2018 8       DEPRESSION ASSESSMENT/PLAN:  Depression screening is negative no further plan needed.     Body mass index is 19.57 kg/m².    BMI ASSESSMENT/PLAN:  Use nutrional calorie supplement shakes 3 times daily and  recommended medical workup, patient  relative  who accompanied the patient declined .  The patient lost about 8 pounds since previous visit however the patient weight in March 2018 was 50  Kg, the weight actually fluctuates up and down. There is no tendency to  progressive weight loss. Patient herself declined any symptoms of discomfort. Has no nausea vomiting. She eats well and enough, no diarrhea. No fever or chills.  Needed Screening/Treatment:   none  Needed follow up: Weight loss in 3 months patient already has an order to follow-up blood test      See orders.   See Patient Instructions section.   Return in about 1 year (around 4/5/2020) for Medicare Wellness Visit.   abnormal

## 2020-04-26 NOTE — H&P ADULT - NSHPPHYSICALEXAM_GEN_ALL_CORE
PHYSICAL EXAM:  Vital Signs Last 24 Hrs  T(C): 37.2 (26 Apr 2020 13:03), Max: 37.2 (26 Apr 2020 13:03)  T(F): 98.9 (26 Apr 2020 13:03), Max: 98.9 (26 Apr 2020 13:03)  HR: 52 (26 Apr 2020 15:35) (45 - 93)  BP: 105/48 (26 Apr 2020 15:35) (91/47 - 105/48)  BP(mean): --  RR: 16 (26 Apr 2020 15:35) (16 - 16)  SpO2: 100% (26 Apr 2020 15:35) (100% - 100%) I&O's Summary PHYSICAL EXAM:  Vital Signs Last 24 Hrs  T(C): 37.2 (26 Apr 2020 13:03), Max: 37.2 (26 Apr 2020 13:03)  T(F): 98.9 (26 Apr 2020 13:03), Max: 98.9 (26 Apr 2020 13:03)  HR: 52 (26 Apr 2020 15:35) (45 - 93)  BP: 105/48 (26 Apr 2020 15:35) (91/47 - 105/48)  BP(mean): --  RR: 16 (26 Apr 2020 15:35) (16 - 16)  SpO2: 100% (26 Apr 2020 15:35) (100% - 100%) I&O's Summary    GEN: well developed male, NAD   HEENT: NCAT, EOMI, no nasal discharge, mucous membranes moist  CV: RRR, +S1/S2, no M/R/G  Resp: CTAB, no W/R/R  GI: Abdomen soft non-distended, NTTP, no masses  : minimal lightbrown stool on rectal exam per ED examination.   MSK: no open wounds, no abrasions  Neuro: A&Ox4, following commands, moving all four extremities spontaneously  Psych: appropriate mood, calm and cooperative. PHYSICAL EXAM:  Vital Signs Last 24 Hrs  T(C): 37.2 (26 Apr 2020 13:03), Max: 37.2 (26 Apr 2020 13:03)  T(F): 98.9 (26 Apr 2020 13:03), Max: 98.9 (26 Apr 2020 13:03)  HR: 52 (26 Apr 2020 15:35) (45 - 93)  BP: 105/48 (26 Apr 2020 15:35) (91/47 - 105/48)  BP(mean): --  RR: 16 (26 Apr 2020 15:35) (16 - 16)  SpO2: 100% (26 Apr 2020 15:35) (100% - 100%) I&O's Summary    GEN: well developed male, elderly, sitting up in bed comfortably, no acute distress, communicative, pleasant.   HEENT: NCAT, EOMI, no nasal discharge, mucous membranes moist  CV: s1, s2+ regular rate and rhythm, no M/R/G  Resp: bilateral air entry, no wheezing rales rhonchi.   GI: Abdomen soft, nontender, non-distended, no masses  : minimal lightbrown stool on rectal exam per ED examination.   MSK: no open wounds, no abrasions  Neuro: A&Ox4, following commands, moving all four extremities spontaneously  Psych: appropriate mood, calm and cooperative, oriented x3.

## 2020-04-26 NOTE — H&P ADULT - NSHPSOURCEINFOTX_GEN_ALL_CORE
Wife (Shannon): [phone];  Daughter Allie [phone] and Katalina [phone])

## 2020-04-26 NOTE — ED ADULT TRIAGE NOTE - CHIEF COMPLAINT QUOTE
Pt. c/o black stools, dizziness, weakness, nausea and diarrhea x few weeks. Seen on 4/6 and diagnosed with upper GI bleed. Symptoms worsening. No blood thinner use. Denies abdominal pain, vomiting or fevers.

## 2020-04-26 NOTE — H&P ADULT - PROBLEM SELECTOR PLAN 1
S/p EGD 4/2/2020 with grade I esophageal varices and multiple non-bleeding duodenal ulcers with pigmented material treated with argon plasma coagulation (APC). Biopsies with no evidence of H. pylori.   - Acute blood loss anemia with melena: Hb 5.3 down from Hb 9 (April 6).  - Monitor H/H closely, trend Hb q6h w/ goal >7, maintain active T&S.   - Transfuse 2 Units PRBC now. f/u POST TRANSFUSION CBC.   - s/p IV PPI 80mg x1, continue IV PPI 40mg BID.   - s/p IV Octreotide 50mcg x1.   - Hepatology/GI CONSULT. F/u recommendations. S/p EGD 4/2/2020 with grade I esophageal varices and multiple non-bleeding duodenal ulcers with pigmented material treated with argon plasma coagulation (APC). Biopsies with no evidence of H. pylori. Possible variceal vs ulcer bleed.   - Acute blood loss anemia with melena: Hb 5.3 down from Hb 9 (April 6).  - Currently hemodynamically stable.   - Monitor H/H closely, trend Hb q6h w/ goal >7, maintain active T&S.   - Transfuse 2 Units PRBC now. f/u POST TRANSFUSION CBC.   - s/p IV PPI 80mg x1, continue IV PPI 40mg BID.   - s/p IV Octreotide 50mcg x1. Continue.  - s/p IV Ceftriaxone 1g ppx continue x7days.  - Hepatology/GI CONSULT. F/u recommendations. S/p EGD 4/2/2020 with grade I esophageal varices and multiple non-bleeding duodenal ulcers with pigmented material treated with argon plasma coagulation (APC). Biopsies with no evidence of H. pylori. Possible variceal vs ulcer bleed.   - Acute blood loss anemia with melena: Hb 5.3 down from Hb 9 (April 6).  - Currently hemodynamically stable.   - Monitor H/H closely, trend Hb q6h w/ goal >7, maintain active T&S.   - Transfuse 2 Units PRBC now. f/u POST TRANSFUSION CBC.   - s/p IV PPI 80mg x1, continue IV PPI 40mg BID.   - s/p IV Octreotide 50mcg x1. Continue IV 50mcg q6h for now.   - s/p IV Ceftriaxone 1g ppx continue x7days.  - Hepatology/GI CONSULT. F/u recommendations.

## 2020-04-26 NOTE — H&P ADULT - PROBLEM SELECTOR PLAN 2
- Cr 2.28, up from Cr 1.3 few weeks ago.   - Cr appears to have worsened over the past year, unclear if new baseline (1.4-2.0).   - Monitor Cr daily, f/u urine lytes, monitor electrolytes.   - Avoid nephrotoxic agents  - Renally dose meds.  - IF Cr continues to uptrend, CONSULT NEPHRO. - Cr 2.28, up from Cr 1.3 few weeks ago.   - Cr appears to have worsened over the past year, unclear if new baseline (1.4-2.0).   - Monitor Cr daily, f/u urine lytes, monitor electrolytes.   - Avoid nephrotoxic agents, Renally dose meds.  - IF Cr continues to uptrend, CONSULT NEPHRO. - Cr 2.28, up from Cr 1.3 few weeks ago. Possibly volume depletion, ATN?  - Cr appears to have worsened over the past year, unclear if new baseline (1.4-2.0).   - Monitor Cr daily, f/u urine lytes, monitor electrolytes.   - Avoid nephrotoxic agents, Renally dose meds.  - IF Cr continues to uptrend, rule out HRS, CONSULT NEPHRO. - Cr 2.28, up from Cr 1.3 few weeks ago. Possibly volume depletion, ATN?  - Cr appears to have worsened over the past year, unclear if new baseline (1.4-2.0).   - Monitor Cr daily, f/u urine lytes, monitor electrolytes. REPEAT K+ next blood draw.  - Avoid nephrotoxic agents, Renally dose meds.  - IF Cr continues to uptrend, rule out HRS, CONSULT NEPHRO. - Cr 2.28, up from Cr 1.3 few weeks ago. Possibly volume depletion, ATN?  - Cr appears to have worsened over the past year, unclear if new baseline (1.4-2.0).   - Monitor Cr daily, f/u urine lytes, monitor electrolytes. REPEAT K+ next blood draw.  - Avoid nephrotoxic agents, Renally dose meds.  - IF Cr continues to uptrend, consider CONSULT NEPHRO.

## 2020-04-26 NOTE — H&P ADULT - PROBLEM SELECTOR PLAN 3
- Currently sober, off transplant list at [facility] as improved.   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  - Hepatic Encephalopathy on Lactulose.   - Negative HCC work up as of 4/19, no ascites seen on CT scan 4/2/2020.   - Hepatology GI Dr. Richie Mcgraw [phone]. CONSULT HEPATOLOGY. - Currently sober, off transplant list at [facility] as improved. MELD score 19-20.   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  - Hepatic Encephalopathy on Lactulose.   - Negative HCC work up as of 4/19, no ascites seen on CT scan 4/2/2020.   - Hepatology GI Dr. Richie Mcgraw [phone]. CONSULT HEPATOLOGY.

## 2020-04-26 NOTE — ED ADULT NURSE NOTE - OBJECTIVE STATEMENT
Pt. c/o black stools, dizziness, weakness, nausea and diarrhea x few weeks. Recently discharged from hospital after dx for upper GI. Symptoms progressively worsening. Denies any blood thinners currently, or abdominal pain. primary concern for weakness today.

## 2020-04-26 NOTE — ED PROVIDER NOTE - ATTENDING CONTRIBUTION TO CARE
I performed a history and physical exam of the patient and discussed their management with the resident.  I reviewed the resident's note and agree with the documented findings and plan of care except as noted below. My medical decision making and observations are as follows:    79M w/ HTN, CKD3, GERD, decompensated alcoholic cirrhosis, esophgeal varices last EGD 4/20/2020 showing Grade I varices GE junction, hx esophageal bleed s/p banding 2018 on propanolol, hx of encephalopathy on lactulose, neg HCC w/u as of 4/19, SMA stenosis, recent hospitalization for GI bleed April 1-6 2020 discharged 4/6 presents with black stools since discharge now with worsening generalized weakness at home over last few days to the point where he cannot walk. Pt with conjunctival palor, heart bradycardic, lungs cta, abd soft ntnd, melenotic stool.  Concern for GIB once again - will check labs, type, guaiac and patient will likley require prbc and admission.

## 2020-04-26 NOTE — H&P ADULT - ASSESSMENT
A 79 year old Male patient w/ PMH of HTN, CKD3, GERD with Dyspepsia/H. Pylori (10/2019), distant GBS, Decompensated Alcoholic Cirrhosis (now sober, off transplant list at [facility] as improved, w/ Small Grade 1 Esophogeal Varices (EGD 9/19) w/ hx Esophageal Bleeding s/p banding 2018 on Propanolol, hx of Hepatic Encephalopathy on Lactulose, Negative HCC w/u as of 4/19, no ascites of 10/19), Porcelain Gallbladder, known R Rectus Sheath Mass, Liver lesion and R Renal pole lesions of unclear etiology, SMA stenosis, Pancreatitis s/p ERCP, GI bleeding 2/2 hemorrhoids, colonic AVMs (s/p cauterization) who presents with black tarry stools, admitted for further management of acute blood loss anemia.     Patient was recently discharged on 4/6 who presented with black stools since discharge now with worsening generalized weakness at home over last few days to the point where he is unable to ambulate. Also, reports occasional lightheadedness, no falls, LOC, or head injury. No recent fevers, flu like symptoms, cough, shortness of breath, chest pain, palpitations, nausea, vomiting, or urinary symptoms. No blood thinners, like AC, or ASA/Plavix at home. A 79 year old Male patient w/ PMH of HTN, CKD3, GERD with Dyspepsia/H. Pylori (10/2019), distant GBS, Decompensated Alcoholic Cirrhosis (now sober, off transplant list at [facility] as improved, w/ Small Grade 1 Esophogeal Varices (EGD 9/19) w/ hx Esophageal Bleeding s/p banding 2018 on Propanolol, hx of Hepatic Encephalopathy on Lactulose, Negative HCC w/u as of 4/19, no ascites of 10/19), Porcelain Gallbladder, known R Rectus Sheath Mass, Liver lesion and R Renal pole lesions of unclear etiology, SMA stenosis, Pancreatitis s/p ERCP, GI bleeding 2/2 hemorrhoids, colonic AVMs (s/p cauterization) who presents with black tarry stools, admitted for further management of acute blood loss anemia.

## 2020-04-26 NOTE — ED PROVIDER NOTE - PHYSICAL EXAMINATION
Gen: well developed male, NAD   HEENT: NCAT, EOMI, no nasal discharge, mucous membranes moist  CV: RRR, +S1/S2, no M/R/G  Resp: CTAB, no W/R/R  GI: Abdomen soft non-distended, NTTP, no masses  : minimal lightbrown stool on rectal exam   MSK: no open wounds, no abrasions   Neuro: A&Ox4, following commands, moving all four extremities spontaneously  Psych: appropriate mood

## 2020-04-27 DIAGNOSIS — E87.5 HYPERKALEMIA: ICD-10-CM

## 2020-04-27 LAB
ALBUMIN SERPL ELPH-MCNC: 2.9 G/DL — LOW (ref 3.3–5)
ALP SERPL-CCNC: 75 U/L — SIGNIFICANT CHANGE UP (ref 40–120)
ALT FLD-CCNC: 11 U/L — SIGNIFICANT CHANGE UP (ref 4–41)
ANION GAP SERPL CALC-SCNC: 12 MMO/L — SIGNIFICANT CHANGE UP (ref 7–14)
ANION GAP SERPL CALC-SCNC: 8 MMO/L — SIGNIFICANT CHANGE UP (ref 7–14)
ANION GAP SERPL CALC-SCNC: 9 MMO/L — SIGNIFICANT CHANGE UP (ref 7–14)
ANION GAP SERPL CALC-SCNC: 9 MMO/L — SIGNIFICANT CHANGE UP (ref 7–14)
APPEARANCE UR: CLEAR — SIGNIFICANT CHANGE UP
APTT BLD: 31.1 SEC — SIGNIFICANT CHANGE UP (ref 27.5–36.3)
AST SERPL-CCNC: 14 U/L — SIGNIFICANT CHANGE UP (ref 4–40)
BASOPHILS # BLD AUTO: 0.08 K/UL — SIGNIFICANT CHANGE UP (ref 0–0.2)
BASOPHILS NFR BLD AUTO: 1.3 % — SIGNIFICANT CHANGE UP (ref 0–2)
BILIRUB SERPL-MCNC: 0.6 MG/DL — SIGNIFICANT CHANGE UP (ref 0.2–1.2)
BILIRUB UR-MCNC: NEGATIVE — SIGNIFICANT CHANGE UP
BLOOD UR QL VISUAL: NEGATIVE — SIGNIFICANT CHANGE UP
BUN SERPL-MCNC: 22 MG/DL — SIGNIFICANT CHANGE UP (ref 7–23)
BUN SERPL-MCNC: 27 MG/DL — HIGH (ref 7–23)
BUN SERPL-MCNC: 29 MG/DL — HIGH (ref 7–23)
BUN SERPL-MCNC: 29 MG/DL — HIGH (ref 7–23)
CALCIUM SERPL-MCNC: 8.5 MG/DL — SIGNIFICANT CHANGE UP (ref 8.4–10.5)
CALCIUM SERPL-MCNC: 8.8 MG/DL — SIGNIFICANT CHANGE UP (ref 8.4–10.5)
CALCIUM SERPL-MCNC: 8.8 MG/DL — SIGNIFICANT CHANGE UP (ref 8.4–10.5)
CALCIUM SERPL-MCNC: 8.9 MG/DL — SIGNIFICANT CHANGE UP (ref 8.4–10.5)
CHLORIDE SERPL-SCNC: 110 MMOL/L — HIGH (ref 98–107)
CHLORIDE SERPL-SCNC: 110 MMOL/L — HIGH (ref 98–107)
CHLORIDE SERPL-SCNC: 113 MMOL/L — HIGH (ref 98–107)
CHLORIDE SERPL-SCNC: 113 MMOL/L — HIGH (ref 98–107)
CO2 SERPL-SCNC: 14 MMOL/L — LOW (ref 22–31)
CO2 SERPL-SCNC: 16 MMOL/L — LOW (ref 22–31)
CO2 SERPL-SCNC: 16 MMOL/L — LOW (ref 22–31)
CO2 SERPL-SCNC: 17 MMOL/L — LOW (ref 22–31)
COLOR SPEC: SIGNIFICANT CHANGE UP
CREAT ?TM UR-MCNC: 55.5 MG/DL — SIGNIFICANT CHANGE UP
CREAT SERPL-MCNC: 1.83 MG/DL — HIGH (ref 0.5–1.3)
CREAT SERPL-MCNC: 2.02 MG/DL — HIGH (ref 0.5–1.3)
CREAT SERPL-MCNC: 2.04 MG/DL — HIGH (ref 0.5–1.3)
CREAT SERPL-MCNC: 2.1 MG/DL — HIGH (ref 0.5–1.3)
EOSINOPHIL # BLD AUTO: 0.97 K/UL — HIGH (ref 0–0.5)
EOSINOPHIL NFR BLD AUTO: 15.9 % — HIGH (ref 0–6)
GLUCOSE BLDC GLUCOMTR-MCNC: 107 MG/DL — HIGH (ref 70–99)
GLUCOSE BLDC GLUCOMTR-MCNC: 161 MG/DL — HIGH (ref 70–99)
GLUCOSE BLDC GLUCOMTR-MCNC: 162 MG/DL — HIGH (ref 70–99)
GLUCOSE BLDC GLUCOMTR-MCNC: 61 MG/DL — LOW (ref 70–99)
GLUCOSE BLDC GLUCOMTR-MCNC: 62 MG/DL — LOW (ref 70–99)
GLUCOSE BLDC GLUCOMTR-MCNC: 68 MG/DL — LOW (ref 70–99)
GLUCOSE SERPL-MCNC: 113 MG/DL — HIGH (ref 70–99)
GLUCOSE SERPL-MCNC: 238 MG/DL — HIGH (ref 70–99)
GLUCOSE SERPL-MCNC: 78 MG/DL — SIGNIFICANT CHANGE UP (ref 70–99)
GLUCOSE SERPL-MCNC: 92 MG/DL — SIGNIFICANT CHANGE UP (ref 70–99)
GLUCOSE UR-MCNC: NEGATIVE — SIGNIFICANT CHANGE UP
HCT VFR BLD CALC: 26.4 % — LOW (ref 39–50)
HCT VFR BLD CALC: 28.1 % — LOW (ref 39–50)
HGB BLD-MCNC: 8.5 G/DL — LOW (ref 13–17)
HGB BLD-MCNC: 9 G/DL — LOW (ref 13–17)
IMM GRANULOCYTES NFR BLD AUTO: 0.7 % — SIGNIFICANT CHANGE UP (ref 0–1.5)
INR BLD: 1.45 — HIGH (ref 0.88–1.17)
KETONES UR-MCNC: NEGATIVE — SIGNIFICANT CHANGE UP
LEUKOCYTE ESTERASE UR-ACNC: HIGH
LYMPHOCYTES # BLD AUTO: 1.29 K/UL — SIGNIFICANT CHANGE UP (ref 1–3.3)
LYMPHOCYTES # BLD AUTO: 21.1 % — SIGNIFICANT CHANGE UP (ref 13–44)
MAGNESIUM SERPL-MCNC: 2 MG/DL — SIGNIFICANT CHANGE UP (ref 1.6–2.6)
MAGNESIUM SERPL-MCNC: 2.1 MG/DL — SIGNIFICANT CHANGE UP (ref 1.6–2.6)
MAGNESIUM SERPL-MCNC: 2.1 MG/DL — SIGNIFICANT CHANGE UP (ref 1.6–2.6)
MCHC RBC-ENTMCNC: 27.4 PG — SIGNIFICANT CHANGE UP (ref 27–34)
MCHC RBC-ENTMCNC: 28 PG — SIGNIFICANT CHANGE UP (ref 27–34)
MCHC RBC-ENTMCNC: 32 % — SIGNIFICANT CHANGE UP (ref 32–36)
MCHC RBC-ENTMCNC: 32.2 % — SIGNIFICANT CHANGE UP (ref 32–36)
MCV RBC AUTO: 85.7 FL — SIGNIFICANT CHANGE UP (ref 80–100)
MCV RBC AUTO: 86.8 FL — SIGNIFICANT CHANGE UP (ref 80–100)
MONOCYTES # BLD AUTO: 0.54 K/UL — SIGNIFICANT CHANGE UP (ref 0–0.9)
MONOCYTES NFR BLD AUTO: 8.8 % — SIGNIFICANT CHANGE UP (ref 2–14)
NEUTROPHILS # BLD AUTO: 3.19 K/UL — SIGNIFICANT CHANGE UP (ref 1.8–7.4)
NEUTROPHILS NFR BLD AUTO: 52.2 % — SIGNIFICANT CHANGE UP (ref 43–77)
NITRITE UR-MCNC: NEGATIVE — SIGNIFICANT CHANGE UP
NRBC # FLD: 0 K/UL — SIGNIFICANT CHANGE UP (ref 0–0)
NRBC # FLD: 0 K/UL — SIGNIFICANT CHANGE UP (ref 0–0)
PH UR: 5.5 — SIGNIFICANT CHANGE UP (ref 5–8)
PHOSPHATE SERPL-MCNC: 3.5 MG/DL — SIGNIFICANT CHANGE UP (ref 2.5–4.5)
PHOSPHATE SERPL-MCNC: 3.5 MG/DL — SIGNIFICANT CHANGE UP (ref 2.5–4.5)
PHOSPHATE SERPL-MCNC: 3.7 MG/DL — SIGNIFICANT CHANGE UP (ref 2.5–4.5)
PLATELET # BLD AUTO: 211 K/UL — SIGNIFICANT CHANGE UP (ref 150–400)
PLATELET # BLD AUTO: 222 K/UL — SIGNIFICANT CHANGE UP (ref 150–400)
PMV BLD: 10.1 FL — SIGNIFICANT CHANGE UP (ref 7–13)
PMV BLD: 10.4 FL — SIGNIFICANT CHANGE UP (ref 7–13)
POTASSIUM SERPL-MCNC: 5.4 MMOL/L — HIGH (ref 3.5–5.3)
POTASSIUM SERPL-MCNC: 5.5 MMOL/L — HIGH (ref 3.5–5.3)
POTASSIUM SERPL-MCNC: 5.5 MMOL/L — HIGH (ref 3.5–5.3)
POTASSIUM SERPL-MCNC: 5.6 MMOL/L — HIGH (ref 3.5–5.3)
POTASSIUM SERPL-SCNC: 5.4 MMOL/L — HIGH (ref 3.5–5.3)
POTASSIUM SERPL-SCNC: 5.5 MMOL/L — HIGH (ref 3.5–5.3)
POTASSIUM SERPL-SCNC: 5.5 MMOL/L — HIGH (ref 3.5–5.3)
POTASSIUM SERPL-SCNC: 5.6 MMOL/L — HIGH (ref 3.5–5.3)
PROT SERPL-MCNC: 6.1 G/DL — SIGNIFICANT CHANGE UP (ref 6–8.3)
PROT UR-MCNC: 11.2 MG/DL — SIGNIFICANT CHANGE UP
PROT UR-MCNC: NEGATIVE — SIGNIFICANT CHANGE UP
PROTHROM AB SERPL-ACNC: 16.9 SEC — HIGH (ref 9.8–13.1)
RBC # BLD: 3.04 M/UL — LOW (ref 4.2–5.8)
RBC # BLD: 3.28 M/UL — LOW (ref 4.2–5.8)
RBC # FLD: 16.3 % — HIGH (ref 10.3–14.5)
RBC # FLD: 16.3 % — HIGH (ref 10.3–14.5)
RBC CASTS # UR COMP ASSIST: SIGNIFICANT CHANGE UP (ref 0–?)
SARS-COV-2 RNA SPEC QL NAA+PROBE: SIGNIFICANT CHANGE UP
SODIUM SERPL-SCNC: 135 MMOL/L — SIGNIFICANT CHANGE UP (ref 135–145)
SODIUM SERPL-SCNC: 136 MMOL/L — SIGNIFICANT CHANGE UP (ref 135–145)
SODIUM SERPL-SCNC: 137 MMOL/L — SIGNIFICANT CHANGE UP (ref 135–145)
SODIUM SERPL-SCNC: 139 MMOL/L — SIGNIFICANT CHANGE UP (ref 135–145)
SP GR SPEC: 1.01 — SIGNIFICANT CHANGE UP (ref 1–1.04)
SQUAMOUS # UR AUTO: SIGNIFICANT CHANGE UP
UROBILINOGEN FLD QL: NORMAL — SIGNIFICANT CHANGE UP
WBC # BLD: 5.12 K/UL — SIGNIFICANT CHANGE UP (ref 3.8–10.5)
WBC # BLD: 6.11 K/UL — SIGNIFICANT CHANGE UP (ref 3.8–10.5)
WBC # FLD AUTO: 5.12 K/UL — SIGNIFICANT CHANGE UP (ref 3.8–10.5)
WBC # FLD AUTO: 6.11 K/UL — SIGNIFICANT CHANGE UP (ref 3.8–10.5)
WBC UR QL: >50 — HIGH (ref 0–?)

## 2020-04-27 PROCEDURE — 99223 1ST HOSP IP/OBS HIGH 75: CPT | Mod: GC

## 2020-04-27 PROCEDURE — 93010 ELECTROCARDIOGRAM REPORT: CPT

## 2020-04-27 PROCEDURE — 99233 SBSQ HOSP IP/OBS HIGH 50: CPT

## 2020-04-27 RX ORDER — SODIUM ZIRCONIUM CYCLOSILICATE 10 G/10G
5 POWDER, FOR SUSPENSION ORAL ONCE
Refills: 0 | Status: COMPLETED | OUTPATIENT
Start: 2020-04-27 | End: 2020-04-27

## 2020-04-27 RX ORDER — INSULIN HUMAN 100 [IU]/ML
5 INJECTION, SOLUTION SUBCUTANEOUS ONCE
Refills: 0 | Status: COMPLETED | OUTPATIENT
Start: 2020-04-27 | End: 2020-04-27

## 2020-04-27 RX ORDER — SODIUM ZIRCONIUM CYCLOSILICATE 10 G/10G
10 POWDER, FOR SUSPENSION ORAL ONCE
Refills: 0 | Status: COMPLETED | OUTPATIENT
Start: 2020-04-27 | End: 2020-04-27

## 2020-04-27 RX ORDER — ALBUTEROL 90 UG/1
2.5 AEROSOL, METERED ORAL ONCE
Refills: 0 | Status: DISCONTINUED | OUTPATIENT
Start: 2020-04-27 | End: 2020-04-27

## 2020-04-27 RX ORDER — ALBUTEROL 90 UG/1
10 AEROSOL, METERED ORAL ONCE
Refills: 0 | Status: COMPLETED | OUTPATIENT
Start: 2020-04-27 | End: 2020-04-28

## 2020-04-27 RX ORDER — PANTOPRAZOLE SODIUM 20 MG/1
8 TABLET, DELAYED RELEASE ORAL
Qty: 80 | Refills: 0 | Status: DISCONTINUED | OUTPATIENT
Start: 2020-04-27 | End: 2020-04-28

## 2020-04-27 RX ORDER — DEXTROSE 50 % IN WATER 50 %
50 SYRINGE (ML) INTRAVENOUS ONCE
Refills: 0 | Status: COMPLETED | OUTPATIENT
Start: 2020-04-27 | End: 2020-04-27

## 2020-04-27 RX ORDER — ALBUTEROL 90 UG/1
10 AEROSOL, METERED ORAL ONCE
Refills: 0 | Status: DISCONTINUED | OUTPATIENT
Start: 2020-04-27 | End: 2020-04-27

## 2020-04-27 RX ORDER — SODIUM BICARBONATE 1 MEQ/ML
650 SYRINGE (ML) INTRAVENOUS
Refills: 0 | Status: DISCONTINUED | OUTPATIENT
Start: 2020-04-27 | End: 2020-04-28

## 2020-04-27 RX ORDER — SODIUM CHLORIDE 9 MG/ML
500 INJECTION INTRAMUSCULAR; INTRAVENOUS; SUBCUTANEOUS ONCE
Refills: 0 | Status: COMPLETED | OUTPATIENT
Start: 2020-04-27 | End: 2020-04-27

## 2020-04-27 RX ORDER — OCTREOTIDE ACETATE 200 UG/ML
25 INJECTION, SOLUTION INTRAVENOUS; SUBCUTANEOUS
Qty: 500 | Refills: 0 | Status: DISCONTINUED | OUTPATIENT
Start: 2020-04-27 | End: 2020-04-28

## 2020-04-27 RX ADMIN — LACTULOSE 15 GRAM(S): 10 SOLUTION ORAL at 21:18

## 2020-04-27 RX ADMIN — CEFTRIAXONE 100 MILLIGRAM(S): 500 INJECTION, POWDER, FOR SOLUTION INTRAMUSCULAR; INTRAVENOUS at 07:50

## 2020-04-27 RX ADMIN — SODIUM ZIRCONIUM CYCLOSILICATE 10 GRAM(S): 10 POWDER, FOR SUSPENSION ORAL at 18:46

## 2020-04-27 RX ADMIN — Medication 650 MILLIGRAM(S): at 21:18

## 2020-04-27 RX ADMIN — Medication 50 MILLILITER(S): at 15:12

## 2020-04-27 RX ADMIN — SODIUM CHLORIDE 500 MILLILITER(S): 9 INJECTION INTRAMUSCULAR; INTRAVENOUS; SUBCUTANEOUS at 15:17

## 2020-04-27 RX ADMIN — OCTREOTIDE ACETATE 5 MICROGRAM(S)/HR: 200 INJECTION, SOLUTION INTRAVENOUS; SUBCUTANEOUS at 23:18

## 2020-04-27 RX ADMIN — Medication 1 GRAM(S): at 06:32

## 2020-04-27 RX ADMIN — PANTOPRAZOLE SODIUM 10 MG/HR: 20 TABLET, DELAYED RELEASE ORAL at 23:17

## 2020-04-27 RX ADMIN — PANTOPRAZOLE SODIUM 10 MG/HR: 20 TABLET, DELAYED RELEASE ORAL at 15:13

## 2020-04-27 RX ADMIN — PANTOPRAZOLE SODIUM 40 MILLIGRAM(S): 20 TABLET, DELAYED RELEASE ORAL at 06:32

## 2020-04-27 RX ADMIN — Medication 1 GRAM(S): at 14:42

## 2020-04-27 RX ADMIN — INSULIN HUMAN 5 UNIT(S): 100 INJECTION, SOLUTION SUBCUTANEOUS at 15:12

## 2020-04-27 RX ADMIN — Medication 1 GRAM(S): at 23:18

## 2020-04-27 RX ADMIN — OCTREOTIDE ACETATE 50 MICROGRAM(S): 200 INJECTION, SOLUTION INTRAVENOUS; SUBCUTANEOUS at 06:32

## 2020-04-27 RX ADMIN — LACTULOSE 15 GRAM(S): 10 SOLUTION ORAL at 06:32

## 2020-04-27 RX ADMIN — SODIUM ZIRCONIUM CYCLOSILICATE 5 GRAM(S): 10 POWDER, FOR SUSPENSION ORAL at 07:50

## 2020-04-27 RX ADMIN — OCTREOTIDE ACETATE 5 MICROGRAM(S)/HR: 200 INJECTION, SOLUTION INTRAVENOUS; SUBCUTANEOUS at 18:46

## 2020-04-27 NOTE — CHART NOTE - NSCHARTNOTEFT_GEN_A_CORE
Patient with persistent hyperkalemia 5.5. Will give Lokelma 5g x 1 and check EKG. Monitor Cr - 2.02 --> 2.1. UA, Urine lytes, Cr/Protein ordered. Monitor strict i's and o's. Discussed with Hospitalist- Dr. Vasquez. Will continue to monitor. Patient with persistent hyperkalemia 5.5. Will give Lokelma 5g x 1 EKG without peaked T waves. Monitor Cr - 2.02 --> 2.1. UA, Urine lytes, Cr/Protein ordered. Monitor strict i's and o's. Discussed with Hospitalist- Dr. Vasquez. Will continue to monitor.

## 2020-04-27 NOTE — PROGRESS NOTE ADULT - SUBJECTIVE AND OBJECTIVE BOX
[facility] Division of Hospital Medicine  Rosie Vickers MD  Pager: [phone]      Patient is a 80y old  Male who presents with a chief complaint of GI bleeding, Acute blood loss anemia. (2020 20:16)      SUBJECTIVE / OVERNIGHT EVENTS: Patient seen and examined. He feels well today, denies abdominal pain, chest pain, sob. He states his bowel movements at home were dark and chalky and he was very weak. Weakness improved somewhat after PRBC transfusion.     MEDICATIONS  (STANDING):  ALBUTerol   0.5% 10 milliGRAM(s) Nebulizer once  cefTRIAXone   IVPB 1000 milliGRAM(s) IV Intermittent every 24 hours  dextrose 50% Injectable 50 milliLiter(s) IV Push once  insulin regular  human recombinant 5 Unit(s) IV Push once  lactulose Syrup 15 Gram(s) Oral two times a day  octreotide  Infusion 25 MICROgram(s)/Hr (5 mL/Hr) IV Continuous <Continuous>  pantoprazole Infusion 8 mG/Hr (10 mL/Hr) IV Continuous <Continuous>  sodium zirconium cyclosilicate 10 Gram(s) Oral once  sucralfate 1 Gram(s) Oral four times a day    MEDICATIONS  (PRN):  acetaminophen   Tablet .. 650 milliGRAM(s) Oral every 6 hours PRN Temp greater or equal to 38C (100.4F), Mild Pain (1 - 3)      I&O's Summary    2020 07:  -  2020 07:00  --------------------------------------------------------  IN: 0 mL / OUT: 250 mL / NET: -250 mL    2020 07:  -  2020 14:38  --------------------------------------------------------  IN: 0 mL / OUT: 200 mL / NET: -200 mL        PHYSICAL EXAM:  Vital Signs Last 24 Hrs  T(C): 36.7 (2020 12:12), Max: 36.7 (2020 18:00)  T(F): 98 (2020 12:12), Max: 98.1 (2020 18:00)  HR: 57 (2020 12:12) (52 - 61)  BP: 156/74 (2020 12:12) (105/48 - 156/74)  BP(mean): --  RR: 17 (2020 12:12) (16 - 20)  SpO2: 100% (2020 12:12) (96% - 100%)    CONSTITUTIONAL: NAD, well-developed, well-groomed  EYES: PERRLA; conjunctiva and sclera clear  RESPIRATORY: Normal respiratory effort; lungs are clear to auscultation bilaterally  CARDIOVASCULAR: Regular rate and rhythm, normal S1 and S2, no murmur/rub/gallop; No lower extremity edema  ABDOMEN: Nontender to palpation, normoactive bowel sounds, no rebound/guarding  PSYCH: A+O to person, place, and time; affect appropriate  NEUROLOGY: no focal deficits  SKIN: No rashes; no palpable lesions    LABS:                        9.0    5.12  )-----------( 211      ( 2020 12:10 )             28.1     04-27    136  |  113<H>  |  27<H>  ----------------------------<  113<H>  5.6<H>   |  14<L>  |  2.04<H>    Ca    8.8      2020 12:10  Phos  3.7     04-27  Mg     2.1     -27    TPro  6.1  /  Alb  2.9<L>  /  TBili  0.6  /  DBili  x   /  AST  14  /  ALT  11  /  AlkPhos  75  04-27    PT/INR - ( 2020 03:20 )   PT: 16.9 SEC;   INR: 1.45          PTT - ( 2020 03:20 )  PTT:31.1 SEC      Urinalysis Basic - ( 2020 05:50 )    Color: LT. YELLOW / Appearance: CLEAR / S.009 / pH: 5.5  Gluc: NEGATIVE / Ketone: NEGATIVE  / Bili: NEGATIVE / Urobili: NORMAL   Blood: NEGATIVE / Protein: NEGATIVE / Nitrite: NEGATIVE   Leuk Esterase: LARGE / RBC: 0-2 / WBC >50   Sq Epi: OCC / Non Sq Epi: x / Bacteria: x

## 2020-04-27 NOTE — PROGRESS NOTE ADULT - PROBLEM SELECTOR PLAN 3
- Persistent HyperK, likely in setting of KAY.   - Will give medical management again for now   - Start sodium bicarb  - Monitor BMP this evening after treatment - Persistent HyperK, likely in setting of KAY. Received lokelma 5mg this AM   - Will give medical management again for now (500cc NS, D50/Insulin, lokelma, albuterol)   - Start sodium bicarb PO standing for metabolic acidosis   - Monitor BMP this evening after treatment

## 2020-04-27 NOTE — PROVIDER CONTACT NOTE (OTHER) - ACTION/TREATMENT ORDERED:
NP aware of low FS following insulin and dextrose administration. Continue to check FS as per protocol until FS greater than 100.

## 2020-04-27 NOTE — PROGRESS NOTE ADULT - PROBLEM SELECTOR PLAN 1
S/p EGD 4/2/2020 with grade I esophageal varices and multiple non-bleeding duodenal ulcers with pigmented material treated with argon plasma coagulation (APC). Biopsies with no evidence of H. pylori. Possible variceal vs ulcer bleed.   - Acute blood loss anemia with melena: Hb 5.3 on admission, s/p 2U PRBCs  - Currently hemodynamically stable.   - Monitor H/H closely, trend Hb q6h w/ goal >7, maintain active T&S.   - GI recs appreciated: c/w PPI gtt, octreotide gtt  - Ceftriaxone 1g x 7 days  - hold propranolol for now  - Plan for EGD tomorrow (need covid test prior to EGD)

## 2020-04-27 NOTE — PROGRESS NOTE ADULT - PROBLEM SELECTOR PLAN 2
- Cr 2.28, up from Cr 1.3 few weeks ago. Possibly volume depletion, ATN?  - Improved slightly after PRBC transfusion  - Cr appears to have worsened over the past year, unclear if new baseline (1.4-2.0).   - Monitor Cr daily, f/u urine lytes, monitor electrolytes.   - Avoid nephrotoxic agents, Renally dose meds.  - IF Cr continues to uptrend, consider CONSULT NEPHRO. - Cr 2.28, up from Cr 1.3 few weeks ago. Possibly volume depletion, ATN?  - Improved slightly after PRBC transfusion  - Cr appears to have worsened over the past year, unclear if new baseline (1.4-2.0).   - Monitor Cr daily, f/u urine lytes, monitor electrolytes.   - Avoid nephrotoxic agents, Renally dose meds.  - If Cr continues to uptrend, consider renal consult  - Start on sodium bicarb 650mg BID given metabolic acidosis and CKD

## 2020-04-27 NOTE — PROGRESS NOTE ADULT - ASSESSMENT
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 Porcelain Gallbladder, known R Rectus Sheath Mass, Liver lesion and R Renal pole lesions of unclear etiology, SMA stenosis, Pancreatitis s/p ERCP, GI bleeding 2/2 hemorrhoids, colonic AVMs (s/p cauterization) who presents with black tarry stools, admitted for further management of acute blood loss anemia.

## 2020-04-27 NOTE — PROGRESS NOTE ADULT - PROBLEM SELECTOR PLAN 5
- DVT ppx: no chemic ppx due to active GI bleeding, SCDs for now.   - Diet: NPO after midnight for EGD   - Activity: as tolerated.   - Dispo: pending hospital course.

## 2020-04-27 NOTE — PROGRESS NOTE ADULT - PROBLEM SELECTOR PLAN 4
- Currently sober, off transplant list at [facility] as improved. MELD score 19-20.   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  - Hepatic Encephalopathy on Lactulose.   - Negative HCC work up as of 4/19, no ascites seen on CT scan 4/2/2020.   - Hepatology GI Dr. Richie Mcgraw [phone].

## 2020-04-28 ENCOUNTER — RESULT REVIEW (OUTPATIENT)
Age: 80
End: 2020-04-28

## 2020-04-28 LAB
ALBUMIN SERPL ELPH-MCNC: 2.8 G/DL — LOW (ref 3.3–5)
ALP SERPL-CCNC: 77 U/L — SIGNIFICANT CHANGE UP (ref 40–120)
ALT FLD-CCNC: 14 U/L — SIGNIFICANT CHANGE UP (ref 4–41)
ANION GAP SERPL CALC-SCNC: 8 MMO/L — SIGNIFICANT CHANGE UP (ref 7–14)
AST SERPL-CCNC: 23 U/L — SIGNIFICANT CHANGE UP (ref 4–40)
BASOPHILS # BLD AUTO: 0.04 K/UL — SIGNIFICANT CHANGE UP (ref 0–0.2)
BASOPHILS NFR BLD AUTO: 0.7 % — SIGNIFICANT CHANGE UP (ref 0–2)
BILIRUB SERPL-MCNC: 0.5 MG/DL — SIGNIFICANT CHANGE UP (ref 0.2–1.2)
BUN SERPL-MCNC: 18 MG/DL — SIGNIFICANT CHANGE UP (ref 7–23)
CALCIUM SERPL-MCNC: 8.4 MG/DL — SIGNIFICANT CHANGE UP (ref 8.4–10.5)
CHLORIDE SERPL-SCNC: 112 MMOL/L — HIGH (ref 98–107)
CHLORIDE UR-SCNC: 37 MMOL/L — SIGNIFICANT CHANGE UP
CO2 SERPL-SCNC: 16 MMOL/L — LOW (ref 22–31)
CREAT SERPL-MCNC: 1.69 MG/DL — HIGH (ref 0.5–1.3)
EOSINOPHIL # BLD AUTO: 0.74 K/UL — HIGH (ref 0–0.5)
EOSINOPHIL NFR BLD AUTO: 12.9 % — HIGH (ref 0–6)
GLUCOSE SERPL-MCNC: 190 MG/DL — HIGH (ref 70–99)
HCT VFR BLD CALC: 28.4 % — LOW (ref 39–50)
HGB BLD-MCNC: 8.8 G/DL — LOW (ref 13–17)
IMM GRANULOCYTES NFR BLD AUTO: 0.5 % — SIGNIFICANT CHANGE UP (ref 0–1.5)
LYMPHOCYTES # BLD AUTO: 1.18 K/UL — SIGNIFICANT CHANGE UP (ref 1–3.3)
LYMPHOCYTES # BLD AUTO: 20.6 % — SIGNIFICANT CHANGE UP (ref 13–44)
MAGNESIUM SERPL-MCNC: 1.8 MG/DL — SIGNIFICANT CHANGE UP (ref 1.6–2.6)
MCHC RBC-ENTMCNC: 27.1 PG — SIGNIFICANT CHANGE UP (ref 27–34)
MCHC RBC-ENTMCNC: 31 % — LOW (ref 32–36)
MCV RBC AUTO: 87.4 FL — SIGNIFICANT CHANGE UP (ref 80–100)
MONOCYTES # BLD AUTO: 0.55 K/UL — SIGNIFICANT CHANGE UP (ref 0–0.9)
MONOCYTES NFR BLD AUTO: 9.6 % — SIGNIFICANT CHANGE UP (ref 2–14)
NEUTROPHILS # BLD AUTO: 3.2 K/UL — SIGNIFICANT CHANGE UP (ref 1.8–7.4)
NEUTROPHILS NFR BLD AUTO: 55.7 % — SIGNIFICANT CHANGE UP (ref 43–77)
NRBC # FLD: 0 K/UL — SIGNIFICANT CHANGE UP (ref 0–0)
PHOSPHATE SERPL-MCNC: 3 MG/DL — SIGNIFICANT CHANGE UP (ref 2.5–4.5)
PLATELET # BLD AUTO: 192 K/UL — SIGNIFICANT CHANGE UP (ref 150–400)
PMV BLD: 10.5 FL — SIGNIFICANT CHANGE UP (ref 7–13)
POTASSIUM SERPL-MCNC: 4.9 MMOL/L — SIGNIFICANT CHANGE UP (ref 3.5–5.3)
POTASSIUM SERPL-SCNC: 4.9 MMOL/L — SIGNIFICANT CHANGE UP (ref 3.5–5.3)
POTASSIUM UR-SCNC: 7.6 MMOL/L — SIGNIFICANT CHANGE UP
PROT SERPL-MCNC: 6.1 G/DL — SIGNIFICANT CHANGE UP (ref 6–8.3)
RBC # BLD: 3.25 M/UL — LOW (ref 4.2–5.8)
RBC # FLD: 16.4 % — HIGH (ref 10.3–14.5)
SODIUM SERPL-SCNC: 136 MMOL/L — SIGNIFICANT CHANGE UP (ref 135–145)
SODIUM UR-SCNC: 37 MMOL/L — SIGNIFICANT CHANGE UP
WBC # BLD: 5.74 K/UL — SIGNIFICANT CHANGE UP (ref 3.8–10.5)
WBC # FLD AUTO: 5.74 K/UL — SIGNIFICANT CHANGE UP (ref 3.8–10.5)

## 2020-04-28 PROCEDURE — 43239 EGD BIOPSY SINGLE/MULTIPLE: CPT | Mod: GC,59

## 2020-04-28 PROCEDURE — 88305 TISSUE EXAM BY PATHOLOGIST: CPT | Mod: 26

## 2020-04-28 PROCEDURE — 43247 EGD REMOVE FOREIGN BODY: CPT | Mod: GC

## 2020-04-28 PROCEDURE — 99232 SBSQ HOSP IP/OBS MODERATE 35: CPT | Mod: GC

## 2020-04-28 PROCEDURE — 99233 SBSQ HOSP IP/OBS HIGH 50: CPT

## 2020-04-28 PROCEDURE — 88312 SPECIAL STAINS GROUP 1: CPT | Mod: 26

## 2020-04-28 RX ORDER — PANTOPRAZOLE SODIUM 20 MG/1
8 TABLET, DELAYED RELEASE ORAL
Qty: 80 | Refills: 0 | Status: DISCONTINUED | OUTPATIENT
Start: 2020-04-28 | End: 2020-04-29

## 2020-04-28 RX ORDER — ACETAMINOPHEN 500 MG
650 TABLET ORAL EVERY 6 HOURS
Refills: 0 | Status: DISCONTINUED | OUTPATIENT
Start: 2020-04-28 | End: 2020-05-02

## 2020-04-28 RX ORDER — SOD SULF/SODIUM/NAHCO3/KCL/PEG
4000 SOLUTION, RECONSTITUTED, ORAL ORAL ONCE
Refills: 0 | Status: COMPLETED | OUTPATIENT
Start: 2020-04-28 | End: 2020-04-28

## 2020-04-28 RX ORDER — SUCRALFATE 1 G
1 TABLET ORAL
Refills: 0 | Status: DISCONTINUED | OUTPATIENT
Start: 2020-04-28 | End: 2020-05-02

## 2020-04-28 RX ORDER — CEFTRIAXONE 500 MG/1
1000 INJECTION, POWDER, FOR SOLUTION INTRAMUSCULAR; INTRAVENOUS EVERY 24 HOURS
Refills: 0 | Status: DISCONTINUED | OUTPATIENT
Start: 2020-04-28 | End: 2020-05-02

## 2020-04-28 RX ORDER — LACTULOSE 10 G/15ML
15 SOLUTION ORAL
Refills: 0 | Status: DISCONTINUED | OUTPATIENT
Start: 2020-04-28 | End: 2020-04-30

## 2020-04-28 RX ORDER — SODIUM BICARBONATE 1 MEQ/ML
650 SYRINGE (ML) INTRAVENOUS
Refills: 0 | Status: DISCONTINUED | OUTPATIENT
Start: 2020-04-28 | End: 2020-04-30

## 2020-04-28 RX ORDER — OCTREOTIDE ACETATE 200 UG/ML
25 INJECTION, SOLUTION INTRAVENOUS; SUBCUTANEOUS
Qty: 500 | Refills: 0 | Status: DISCONTINUED | OUTPATIENT
Start: 2020-04-28 | End: 2020-04-29

## 2020-04-28 RX ADMIN — LACTULOSE 15 GRAM(S): 10 SOLUTION ORAL at 17:51

## 2020-04-28 RX ADMIN — ALBUTEROL 10 MILLIGRAM(S): 90 AEROSOL, METERED ORAL at 03:57

## 2020-04-28 RX ADMIN — Medication 1 GRAM(S): at 22:40

## 2020-04-28 RX ADMIN — Medication 1 GRAM(S): at 11:48

## 2020-04-28 RX ADMIN — CEFTRIAXONE 100 MILLIGRAM(S): 500 INJECTION, POWDER, FOR SOLUTION INTRAMUSCULAR; INTRAVENOUS at 06:40

## 2020-04-28 RX ADMIN — Medication 650 MILLIGRAM(S): at 17:54

## 2020-04-28 RX ADMIN — Medication 1 GRAM(S): at 17:51

## 2020-04-28 RX ADMIN — PANTOPRAZOLE SODIUM 10 MG/HR: 20 TABLET, DELAYED RELEASE ORAL at 21:47

## 2020-04-28 RX ADMIN — Medication 650 MILLIGRAM(S): at 22:38

## 2020-04-28 RX ADMIN — Medication 4000 MILLILITER(S): at 17:51

## 2020-04-28 RX ADMIN — PANTOPRAZOLE SODIUM 10 MG/HR: 20 TABLET, DELAYED RELEASE ORAL at 11:46

## 2020-04-28 NOTE — PROGRESS NOTE ADULT - PROBLEM SELECTOR PLAN 2
- Cr 2.28, up from Cr 1.3 few weeks ago. Possibly volume depletion for GIB  - Slowly improving after IVF and PRBC transfusion  - Monitor Cr daily, f/u urine lytes, monitor electrolytes.   - Avoid nephrotoxic agents, Renally dose meds.  - If Cr continues to uptrend, consider renal consult  - Start on sodium bicarb 650mg BID given metabolic acidosis and CKD

## 2020-04-28 NOTE — CHART NOTE - NSCHARTNOTEFT_GEN_A_CORE
EGD unrevealing for cause of melena. Plan for colonoscopy tomorrow  - Place on clear liquid diet  - make NPO at midnight  - GI fellow to order bowel prep        Impression:          EGD:                       -The esophagus contained trace varices.                       -The stomach was cleaned extensively with simethicone.                        No lesions seen. The incisura was nodular. This was                        biopsied for H. pylori.                       -The duodenal bulb was normal. D2 contained duodenitis.                       - PD stent that was removed.  Recommendation:      - Discharge patient to home (ambulatory).                       - Follow up biopsies.                       - Prep for colonoscopy tomorrow.

## 2020-04-28 NOTE — PROGRESS NOTE ADULT - SUBJECTIVE AND OBJECTIVE BOX
[facility] Division of Hospital Medicine  Rosie Vickers MD  Pager: [phone]      Patient is a 80y old  Male who presents with a chief complaint of GI bleeding, Acute blood loss anemia. (2020 09:45)      SUBJECTIVE / OVERNIGHT EVENTS: Patient seen and examined after EGD. States that his abdomen feels a little sore but otherwise no pain. He denies nausea/vomiting. Still with "dark" stools, no BRBPR.     MEDICATIONS  (STANDING):  cefTRIAXone   IVPB 1000 milliGRAM(s) IV Intermittent every 24 hours  lactulose Syrup 15 Gram(s) Oral two times a day  octreotide  Infusion 25 MICROgram(s)/Hr (5 mL/Hr) IV Continuous <Continuous>  pantoprazole Infusion 8 mG/Hr (10 mL/Hr) IV Continuous <Continuous>  polyethylene glycol/electrolyte Solution. 4000 milliLiter(s) Oral once  sodium bicarbonate 650 milliGRAM(s) Oral two times a day  sucralfate 1 Gram(s) Oral four times a day    MEDICATIONS  (PRN):  acetaminophen   Tablet .. 650 milliGRAM(s) Oral every 6 hours PRN Temp greater or equal to 38C (100.4F), Mild Pain (1 - 3)      CAPILLARY BLOOD GLUCOSE      POCT Blood Glucose.: 161 mg/dL (2020 23:01)  POCT Blood Glucose.: 107 mg/dL (2020 18:16)  POCT Blood Glucose.: 68 mg/dL (2020 17:45)  POCT Blood Glucose.: 61 mg/dL (2020 17:22)  POCT Blood Glucose.: 62 mg/dL (2020 17:19)  POCT Blood Glucose.: 162 mg/dL (2020 15:11)    I&O's Summary    2020 07:  -  2020 07:00  --------------------------------------------------------  IN: 560 mL / OUT: 1000 mL / NET: -440 mL    2020 07:  -  2020 13:21  --------------------------------------------------------  IN: 0 mL / OUT: 375 mL / NET: -375 mL        PHYSICAL EXAM:  Vital Signs Last 24 Hrs  T(C): 36.4 (2020 10:51), Max: 37.6 (2020 08:36)  T(F): 97.6 (2020 10:51), Max: 99.6 (2020 08:36)  HR: 55 (2020 10:51) (55 - 84)  BP: 132/62 (2020 10:51) (124/67 - 161/87)  BP(mean): 72 (2020 08:36) (72 - 72)  RR: 18 (2020 10:51) (18 - 20)  SpO2: 100% (2020 10:51) (95% - 100%)    CONSTITUTIONAL: NAD, well-developed, well-groomed  EYES: PERRLA; conjunctiva and sclera clear  RESPIRATORY: Normal respiratory effort; lungs are clear to auscultation bilaterally  CARDIOVASCULAR: Regular rate and rhythm, normal S1 and S2, no murmur/rub/gallop; No lower extremity edema  ABDOMEN: Nontender to palpation, normoactive bowel sounds, no rebound/guarding  PSYCH: A+O to person, place, and time; affect appropriate  NEUROLOGY: no focal deficits  SKIN: No rashes; no palpable lesions    LABS:                        8.8    5.74  )-----------( 192      ( 2020 05:55 )             28.4     04-28    136  |  112<H>  |  18  ----------------------------<  190<H>  4.9   |  16<L>  |  1.69<H>    Ca    8.4      2020 05:55  Phos  3.0       Mg     1.8         TPro  6.1  /  Alb  2.8<L>  /  TBili  0.5  /  DBili  x   /  AST  23  /  ALT  14  /  AlkPhos  77  -28    PT/INR - ( 2020 03:20 )   PT: 16.9 SEC;   INR: 1.45          PTT - ( 2020 03:20 )  PTT:31.1 SEC      Urinalysis Basic - ( 2020 05:50 )    Color: LT. YELLOW / Appearance: CLEAR / S.009 / pH: 5.5  Gluc: NEGATIVE / Ketone: NEGATIVE  / Bili: NEGATIVE / Urobili: NORMAL   Blood: NEGATIVE / Protein: NEGATIVE / Nitrite: NEGATIVE   Leuk Esterase: LARGE / RBC: 0-2 / WBC >50   Sq Epi: OCC / Non Sq Epi: x / Bacteria: x          RADIOLOGY & ADDITIONAL TESTS:  Results Reviewed: X  Imaging Personally Reviewed:  Electrocardiogram Personally Reviewed:    Impression:          EGD:                       -The esophagus contained trace varices.                       -The stomach was cleaned extensively with simethicone.                        No lesions seen. The incisura was nodular. This was                        biopsied for H. pylori.                       -The duodenal bulb was normal. D2 contained duodenitis.                       - PD stent that was removed.

## 2020-04-28 NOTE — PROGRESS NOTE ADULT - SUBJECTIVE AND OBJECTIVE BOX
Chief Complaint:  Patient is a 80y old  Male who presents with a chief complaint of GI bleeding, Acute blood loss anemia. (2020 14:37)      Interval Events: no further overt GI bleeding. No abdominal pain.     Allergies:  No Known Allergies      Hospital Medications:      PMHX/PSHX:  Porcelain gallbladder  Liver cirrhosis  Guillain-Atqasuk syndrome  HTN (hypertension)  H/O inguinal hernia repair      Family history:  Family history of ovarian cancer (Sibling)      ROS:     General:  No wt loss, fevers, chills, night sweats, fatigue,   Eyes:  Good vision, no reported pain  ENT:  No sore throat, pain, runny nose, dysphagia  CV:  No pain, palpitations, hypo/hypertension  Resp:  No dyspnea, cough, tachypnea, wheezing  GI:  See HPI  :  No pain, bleeding, incontinence, nocturia  Muscle:  No pain, weakness  Neuro:  No weakness, tingling, memory problems  Psych:  No fatigue, insomnia, mood problems, depression  Endocrine:  No polyuria, polydipsia, cold/heat intolerance  Heme:  No petechiae, ecchymosis, easy bruisability  Skin:  No rash, edema      PHYSICAL EXAM:     Vital Signs:  Vital Signs Last 24 Hrs  T(C): 37.6 (2020 08:36), Max: 37.6 (2020 08:36)  T(F): 99.6 (2020 08:36), Max: 99.6 (2020 08:36)  HR: 62 (2020 09:05) (57 - 84)  BP: 129/70 (2020 09:05) (124/67 - 161/87)  BP(mean): 72 (2020 08:36) (72 - 72)  RR: 20 (2020 09:05) (17 - 20)  SpO2: 100% (2020 09:05) (95% - 100%)  Daily Height in cm: 175.26 (2020 07:38)    Daily     GENERAL:  appears comfortable, no acute distress  HEENT:  NC/AT,  conjunctivae clear, sclera -anicteric  CHEST:  no increased effort  HEART:  Regular rate and rhythm  ABDOMEN:  Soft, non-tender, non-distended,  no masses ,no hepato-splenomegaly,   EXTREMITIES:  no cyanosis, clubbing or edema  SKIN:  No rash/erythema/ecchymoses/petechiae/wounds  NEURO:  Alert, oriented    LABS:                        8.8    5.74  )-----------( 192      ( 2020 05:55 )             28.4         136  |  112<H>  |  18  ----------------------------<  190<H>  4.9   |  16<L>  |  1.69<H>    Ca    8.4      2020 05:55  Phos  3.0       Mg     1.8         TPro  6.1  /  Alb  2.8<L>  /  TBili  0.5  /  DBili  x   /  AST  23  /  ALT  14  /  AlkPhos  77      LIVER FUNCTIONS - ( 2020 05:55 )  Alb: 2.8 g/dL / Pro: 6.1 g/dL / ALK PHOS: 77 u/L / ALT: 14 u/L / AST: 23 u/L / GGT: x           PT/INR - ( 2020 03:20 )   PT: 16.9 SEC;   INR: 1.45          PTT - ( 2020 03:20 )  PTT:31.1 SEC  Urinalysis Basic - ( 2020 05:50 )    Color: LT. YELLOW / Appearance: CLEAR / S.009 / pH: 5.5  Gluc: NEGATIVE / Ketone: NEGATIVE  / Bili: NEGATIVE / Urobili: NORMAL   Blood: NEGATIVE / Protein: NEGATIVE / Nitrite: NEGATIVE   Leuk Esterase: LARGE / RBC: 0-2 / WBC >50   Sq Epi: OCC / Non Sq Epi: x / Bacteria: x          Imaging:

## 2020-04-28 NOTE — PROGRESS NOTE ADULT - ASSESSMENT
80 yo M with PMHx of HTN, CKD3, GERD with Dyspepsia/H. Pylori (10/2019), distant GBS (1996, hospitalized [id number] w/ paralysis), decompensated ETOH Cirrhosis (now sober and improved off transplant list at [facility] as improved) cb small Grade 1 Esophogeal Varices (EGD 9/19, hx of esophageal bleed s/p banding 2018 on propanolol) and encephalopathy, porcelain gallbladder now presenting with melena since discharge on 4/6 now with worsening weakness at home.    # Melena: resolved. Pending EGD today. Patient with hx of variceal bleed as well as bleeding AVMs in the past now presenting with melena since last discharge on 4/6. Hgb now relatively stable. EGD on previous admission showing grade 1 duodenal ulcers, multiple duodenal ulcers with pigmented material s/p APCs. INR of 1.5 and plts >200. DDx: PUD vs. AVM vs. gastritis vs. esophagitis vs. dieulofoy lesion  # Alcoholic cirrhosis: MELD Na 9       Varices: small, hx esophageal bleed s/p banding 2018 on propanolol,        Encephalopathy: previously reported, on lactulose       HCC: negative workup CT 4/1/2020       Ascites: none CT 4/1/2020  # KAY: improving, likely prerenal from blood loos    Reccs:   - continue with ppi and octreotide gtt  - continue with home dose of lactulose  - eftrixone 1g x 7 days  - hold propranolol  - transfuse with goal Hb of >7, plt > 50, INR < 1.8  - pending EGD results, pt may need colonoscopy  - supportive care

## 2020-04-28 NOTE — PROGRESS NOTE ADULT - PROBLEM SELECTOR PLAN 5
- DVT ppx: no chemic ppx due to active GI bleeding, SCDs for now.   - Diet: NPO after midnight for colonoscopy   - Activity: as tolerated.   - Dispo: pending hospital course.

## 2020-04-28 NOTE — PROGRESS NOTE ADULT - PROBLEM SELECTOR PLAN 1
S/p EGD 4/2/2020 with grade I esophageal varices and multiple non-bleeding duodenal ulcers with pigmented material treated with argon plasma coagulation (APC). Biopsies with no evidence of H. pylori. Possible variceal vs ulcer bleed.   - Acute blood loss anemia with melena: Hb 5.3 on admission, s/p 2U PRBCs  - Currently hemodynamically stable.   - Monitor H/H closely, trend Hb q6h w/ goal >7, maintain active T&S.   - GI recs appreciated: c/w PPI gtt, octreotide gtt, Ceftriaxone 1g x 7 days  - hold propranolol for now  - S/p EGD today without source of bleeding identified. Plan for colonoscopy tomorrow.

## 2020-04-28 NOTE — PROGRESS NOTE ADULT - PROBLEM SELECTOR PLAN 4
- Currently sober, off transplant list at [facility] as improved. MELD score 19-20.   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  - Hepatic Encephalopathy on Lactulose.   - Negative HCC work up as of 4/19, no ascites seen on CT scan 4/2/2020.   - Hepatology GI Dr. Richie Mcgraw [phone].

## 2020-04-29 ENCOUNTER — TRANSCRIPTION ENCOUNTER (OUTPATIENT)
Age: 80
End: 2020-04-29

## 2020-04-29 LAB
ALBUMIN SERPL ELPH-MCNC: 3 G/DL — LOW (ref 3.3–5)
ALP SERPL-CCNC: 78 U/L — SIGNIFICANT CHANGE UP (ref 40–120)
ALT FLD-CCNC: 14 U/L — SIGNIFICANT CHANGE UP (ref 4–41)
ANION GAP SERPL CALC-SCNC: 10 MMO/L — SIGNIFICANT CHANGE UP (ref 7–14)
APTT BLD: 32.8 SEC — SIGNIFICANT CHANGE UP (ref 27.5–36.3)
APTT BLD: 33.5 SEC — SIGNIFICANT CHANGE UP (ref 27.5–36.3)
AST SERPL-CCNC: 20 U/L — SIGNIFICANT CHANGE UP (ref 4–40)
BASOPHILS # BLD AUTO: 0.06 K/UL — SIGNIFICANT CHANGE UP (ref 0–0.2)
BASOPHILS NFR BLD AUTO: 1 % — SIGNIFICANT CHANGE UP (ref 0–2)
BILIRUB SERPL-MCNC: 0.4 MG/DL — SIGNIFICANT CHANGE UP (ref 0.2–1.2)
BUN SERPL-MCNC: 12 MG/DL — SIGNIFICANT CHANGE UP (ref 7–23)
CALCIUM SERPL-MCNC: 8.6 MG/DL — SIGNIFICANT CHANGE UP (ref 8.4–10.5)
CHLORIDE SERPL-SCNC: 113 MMOL/L — HIGH (ref 98–107)
CO2 SERPL-SCNC: 18 MMOL/L — LOW (ref 22–31)
CREAT SERPL-MCNC: 1.54 MG/DL — HIGH (ref 0.5–1.3)
EOSINOPHIL # BLD AUTO: 0.79 K/UL — HIGH (ref 0–0.5)
EOSINOPHIL NFR BLD AUTO: 13.2 % — HIGH (ref 0–6)
GLUCOSE SERPL-MCNC: 119 MG/DL — HIGH (ref 70–99)
HCT VFR BLD CALC: 28.5 % — LOW (ref 39–50)
HGB BLD-MCNC: 8.9 G/DL — LOW (ref 13–17)
IMM GRANULOCYTES NFR BLD AUTO: 0.5 % — SIGNIFICANT CHANGE UP (ref 0–1.5)
INR BLD: 1.51 — HIGH (ref 0.88–1.17)
INR BLD: 1.54 — HIGH (ref 0.88–1.17)
LYMPHOCYTES # BLD AUTO: 0.98 K/UL — LOW (ref 1–3.3)
LYMPHOCYTES # BLD AUTO: 16.4 % — SIGNIFICANT CHANGE UP (ref 13–44)
MAGNESIUM SERPL-MCNC: 1.7 MG/DL — SIGNIFICANT CHANGE UP (ref 1.6–2.6)
MCHC RBC-ENTMCNC: 27.3 PG — SIGNIFICANT CHANGE UP (ref 27–34)
MCHC RBC-ENTMCNC: 31.2 % — LOW (ref 32–36)
MCV RBC AUTO: 87.4 FL — SIGNIFICANT CHANGE UP (ref 80–100)
MONOCYTES # BLD AUTO: 0.58 K/UL — SIGNIFICANT CHANGE UP (ref 0–0.9)
MONOCYTES NFR BLD AUTO: 9.7 % — SIGNIFICANT CHANGE UP (ref 2–14)
NEUTROPHILS # BLD AUTO: 3.54 K/UL — SIGNIFICANT CHANGE UP (ref 1.8–7.4)
NEUTROPHILS NFR BLD AUTO: 59.2 % — SIGNIFICANT CHANGE UP (ref 43–77)
NRBC # FLD: 0 K/UL — SIGNIFICANT CHANGE UP (ref 0–0)
PHOSPHATE SERPL-MCNC: 2.1 MG/DL — LOW (ref 2.5–4.5)
PLATELET # BLD AUTO: 202 K/UL — SIGNIFICANT CHANGE UP (ref 150–400)
PMV BLD: 10.2 FL — SIGNIFICANT CHANGE UP (ref 7–13)
POTASSIUM SERPL-MCNC: 5 MMOL/L — SIGNIFICANT CHANGE UP (ref 3.5–5.3)
POTASSIUM SERPL-SCNC: 5 MMOL/L — SIGNIFICANT CHANGE UP (ref 3.5–5.3)
PROT SERPL-MCNC: 6.1 G/DL — SIGNIFICANT CHANGE UP (ref 6–8.3)
PROTHROM AB SERPL-ACNC: 17.6 SEC — HIGH (ref 9.8–13.1)
PROTHROM AB SERPL-ACNC: 18 SEC — HIGH (ref 9.8–13.1)
RBC # BLD: 3.26 M/UL — LOW (ref 4.2–5.8)
RBC # FLD: 16.8 % — HIGH (ref 10.3–14.5)
SODIUM SERPL-SCNC: 141 MMOL/L — SIGNIFICANT CHANGE UP (ref 135–145)
WBC # BLD: 5.98 K/UL — SIGNIFICANT CHANGE UP (ref 3.8–10.5)
WBC # FLD AUTO: 5.98 K/UL — SIGNIFICANT CHANGE UP (ref 3.8–10.5)

## 2020-04-29 PROCEDURE — 99232 SBSQ HOSP IP/OBS MODERATE 35: CPT | Mod: GC

## 2020-04-29 PROCEDURE — 99232 SBSQ HOSP IP/OBS MODERATE 35: CPT

## 2020-04-29 PROCEDURE — 45382 COLONOSCOPY W/CONTROL BLEED: CPT | Mod: GC

## 2020-04-29 RX ORDER — SODIUM,POTASSIUM PHOSPHATES 278-250MG
1 POWDER IN PACKET (EA) ORAL ONCE
Refills: 0 | Status: COMPLETED | OUTPATIENT
Start: 2020-04-29 | End: 2020-04-29

## 2020-04-29 RX ORDER — PANTOPRAZOLE SODIUM 20 MG/1
40 TABLET, DELAYED RELEASE ORAL
Refills: 0 | Status: DISCONTINUED | OUTPATIENT
Start: 2020-04-29 | End: 2020-05-02

## 2020-04-29 RX ADMIN — Medication 1 GRAM(S): at 12:09

## 2020-04-29 RX ADMIN — Medication 1 GRAM(S): at 23:22

## 2020-04-29 RX ADMIN — OCTREOTIDE ACETATE 5 MICROGRAM(S)/HR: 200 INJECTION, SOLUTION INTRAVENOUS; SUBCUTANEOUS at 06:01

## 2020-04-29 RX ADMIN — Medication 1 GRAM(S): at 06:00

## 2020-04-29 RX ADMIN — LACTULOSE 15 GRAM(S): 10 SOLUTION ORAL at 17:47

## 2020-04-29 RX ADMIN — Medication 650 MILLIGRAM(S): at 06:00

## 2020-04-29 RX ADMIN — Medication 1 PACKET(S): at 12:08

## 2020-04-29 RX ADMIN — Medication 650 MILLIGRAM(S): at 17:47

## 2020-04-29 RX ADMIN — PANTOPRAZOLE SODIUM 40 MILLIGRAM(S): 20 TABLET, DELAYED RELEASE ORAL at 17:48

## 2020-04-29 RX ADMIN — CEFTRIAXONE 100 MILLIGRAM(S): 500 INJECTION, POWDER, FOR SOLUTION INTRAMUSCULAR; INTRAVENOUS at 06:00

## 2020-04-29 RX ADMIN — LACTULOSE 15 GRAM(S): 10 SOLUTION ORAL at 06:01

## 2020-04-29 RX ADMIN — Medication 1 GRAM(S): at 17:47

## 2020-04-29 RX ADMIN — PANTOPRAZOLE SODIUM 10 MG/HR: 20 TABLET, DELAYED RELEASE ORAL at 12:09

## 2020-04-29 NOTE — PROGRESS NOTE ADULT - PROBLEM SELECTOR PLAN 4
- Currently sober, off transplant list at [facility] as improved. MELD score 19-20.   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  - Hepatic Encephalopathy on Lactulose.   - Negative HCC work up as of 4/19, no ascites seen on CT scan 4/2/2020.   - Hepatology GI Dr. Richie Mcgraw [phone]. - Currently sober, off transplant list at [facility] as improved. MELD score 19-20.   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  - Hepatic Encephalopathy on Lactulose.   - Negative HCC work up as of 4/19, no ascites seen on CT scan 4/2/2020.   - Hepatology GI Dr. Richie Mcgraw [phone].  stopped octreotide and PPI drip- PPI bid; propranolol to be restarted

## 2020-04-29 NOTE — PROGRESS NOTE ADULT - ASSESSMENT
78 yo M with PMHx of HTN, CKD3, GERD with Dyspepsia/H. Pylori (10/2019), distant GBS (1996, hospitalized [id number] w/ paralysis), decompensated ETOH Cirrhosis (now sober and improved off transplant list at [facility] as improved) cb small Grade 1 Esophogeal Varices (EGD 9/19, hx of esophageal bleed s/p banding 2018 on propanolol) and encephalopathy, porcelain gallbladder now presenting with melena since discharge on 4/6 now with worsening weakness at home.    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 EGD negative for active bleeding. No furthr overt bleeding and hgb stable.   # Alcoholic cirrhosis: MELD Na 9 on 4/28       Varices: small, hx esophageal bleed s/p banding 2018 on propanolol,        Encephalopathy: previously reported, on lactulose       HCC: negative workup CT 4/1/2020       Ascites: none CT 4/1/2020  # KAY: improved    Reccs:   - can switch to ppi 40mg po BID  - continue with home dose of lactulose  - complete 7 day course of antibiotics given bleed in setting of cirrhosis (can switch to po ciprofloxacin)  - resume home dose propranolol  - no further hepatology/GI reccs  - stable for discharge from hepatology/GI standpoint 80 yo M with PMHx of HTN, CKD3, GERD with Dyspepsia/H. Pylori (10/2019), distant GBS (1996, hospitalized [id number] w/ paralysis), decompensated ETOH Cirrhosis (now sober and improved off transplant list at [facility] as improved) cb small Grade 1 Esophogeal Varices (EGD 9/19, hx of esophageal bleed s/p banding 2018 on propanolol) and encephalopathy, porcelain gallbladder now presenting with melena since discharge on 4/6 now with worsening weakness at home.    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 EGD negative for active bleeding. No furthr overt bleeding and hgb stable.   # Alcoholic cirrhosis: MELD Na 9 on 4/28       Varices: small, hx esophageal bleed s/p banding 2018 on propanolol,        Encephalopathy: previously reported, on lactulose       HCC: negative workup CT 4/1/2020       Ascites: none CT 4/1/2020  # KAY: improved    Reccs:   - can switch to ppi 40mg po BID  - continue with home dose of lactulose  - complete 7 day course of antibiotics given bleed in setting of cirrhosis (can switch to po ciprofloxacin)  - resume home dose propranolol  - stable for discharge from hepatology standpoint

## 2020-04-29 NOTE — PROGRESS NOTE ADULT - SUBJECTIVE AND OBJECTIVE BOX
[facility] Division of Hospital Medicine  Taryn Jhaveri MD  Pager [phone]    Patient is a 80y old  Male who presents with a chief complaint of GI bleeding, Acute blood loss anemia. (29 Apr 2020 16:34)      SUBJECTIVE / OVERNIGHT EVENTS: pt seen and examined at 12p- felt well then  called his wife at 540p  ADDITIONAL REVIEW OF SYSTEMS:    MEDICATIONS  (STANDING):  cefTRIAXone   IVPB 1000 milliGRAM(s) IV Intermittent every 24 hours  lactulose Syrup 15 Gram(s) Oral two times a day  pantoprazole    Tablet 40 milliGRAM(s) Oral two times a day  sodium bicarbonate 650 milliGRAM(s) Oral two times a day  sucralfate 1 Gram(s) Oral four times a day    MEDICATIONS  (PRN):  acetaminophen   Tablet .. 650 milliGRAM(s) Oral every 6 hours PRN Temp greater or equal to 38C (100.4F), Mild Pain (1 - 3)      CAPILLARY BLOOD GLUCOSE        I&O's Summary    28 Apr 2020 07:01  -  29 Apr 2020 07:00  --------------------------------------------------------  IN: 180 mL / OUT: 1175 mL / NET: -995 mL    29 Apr 2020 07:01  -  29 Apr 2020 17:38  --------------------------------------------------------  IN: 860 mL / OUT: 500 mL / NET: 360 mL        PHYSICAL EXAM:  Vital Signs Last 24 Hrs  T(C): 36.4 (29 Apr 2020 17:29), Max: 37.2 (29 Apr 2020 02:29)  T(F): 97.5 (29 Apr 2020 17:29), Max: 98.9 (29 Apr 2020 02:29)  HR: 74 (29 Apr 2020 17:29) (63 - 100)  BP: 154/68 (29 Apr 2020 17:29) (116/84 - 154/68)  BP(mean): --  RR: 18 (29 Apr 2020 17:29) (15 - 18)  SpO2: 100% (29 Apr 2020 17:29) (95% - 100%)  CONSTITUTIONAL: NAD, well-developed, well-groomed  EYES: conjunctiva and sclera clear  NECK: Supple  RESPIRATORY: Normal respiratory effort; lungs are clear to auscultation bilaterally  CARDIOVASCULAR: Regular rate and rhythm, normal S1 and S2, no murmur/rub/gallop; No lower extremity edema;   ABDOMEN: Nontender to palpation, normoactive bowel sounds, not distended;   MUSCULOSKELETAL:  Normal gait; no clubbing or cyanosis of digits; no joint swelling or tenderness to palpation  PSYCH: A+O to person, place, and time; affect appropriate      LABS:                        8.9    5.98  )-----------( 202      ( 29 Apr 2020 05:10 )             28.5     04-29    141  |  113<H>  |  12  ----------------------------<  119<H>  5.0   |  18<L>  |  1.54<H>    Ca    8.6      29 Apr 2020 05:10  Phos  2.1     04-29  Mg     1.7     04-29    TPro  6.1  /  Alb  3.0<L>  /  TBili  0.4  /  DBili  x   /  AST  20  /  ALT  14  /  AlkPhos  78  04-29    PT/INR - ( 29 Apr 2020 07:30 )   PT: 17.6 SEC;   INR: 1.51          PTT - ( 29 Apr 2020 07:30 )  PTT:33.5 SEC            RADIOLOGY & ADDITIONAL TESTS:  Results Reviewed:   Imaging Personally Reviewed:  Electrocardiogram Personally Reviewed:    COORDINATION OF CARE:  Care Discussed with Consultants/Other Providers [Y/N]:  Prior or Outpatient Records Reviewed [Y/N]: [facility] Division of Hospital Medicine  Taryn Jhaveri MD  Pager [phone]    Patient is a 80y old  Male who presents with a chief complaint of GI bleeding, Acute blood loss anemia. (29 Apr 2020 16:34)      SUBJECTIVE / OVERNIGHT EVENTS: pt seen and examined at 12p- felt well then- nervous to go home "too soon" as he had to come back soon  called his wife at 540p  ADDITIONAL REVIEW OF SYSTEMS:    MEDICATIONS  (STANDING):  cefTRIAXone   IVPB 1000 milliGRAM(s) IV Intermittent every 24 hours  lactulose Syrup 15 Gram(s) Oral two times a day  pantoprazole    Tablet 40 milliGRAM(s) Oral two times a day  sodium bicarbonate 650 milliGRAM(s) Oral two times a day  sucralfate 1 Gram(s) Oral four times a day    MEDICATIONS  (PRN):  acetaminophen   Tablet .. 650 milliGRAM(s) Oral every 6 hours PRN Temp greater or equal to 38C (100.4F), Mild Pain (1 - 3)      CAPILLARY BLOOD GLUCOSE        I&O's Summary    28 Apr 2020 07:01  -  29 Apr 2020 07:00  --------------------------------------------------------  IN: 180 mL / OUT: 1175 mL / NET: -995 mL    29 Apr 2020 07:01  -  29 Apr 2020 17:38  --------------------------------------------------------  IN: 860 mL / OUT: 500 mL / NET: 360 mL        PHYSICAL EXAM:  Vital Signs Last 24 Hrs  T(C): 36.4 (29 Apr 2020 17:29), Max: 37.2 (29 Apr 2020 02:29)  T(F): 97.5 (29 Apr 2020 17:29), Max: 98.9 (29 Apr 2020 02:29)  HR: 74 (29 Apr 2020 17:29) (63 - 100)  BP: 154/68 (29 Apr 2020 17:29) (116/84 - 154/68)  BP(mean): --  RR: 18 (29 Apr 2020 17:29) (15 - 18)  SpO2: 100% (29 Apr 2020 17:29) (95% - 100%)  CONSTITUTIONAL: NAD,   RESPIRATORY: Normal respiratory effort; lungs are clear to auscultation bilaterally  CARDIOVASCULAR: Regular rate and rhythm, normal S1 and S2, ; No lower extremity edema;   ABDOMEN: Nontender to palpation, normoactive bowel sounds, not distended;   MUSCULOSKELETAL:; no clubbing or cyanosis of digits;       LABS:                        8.9    5.98  )-----------( 202      ( 29 Apr 2020 05:10 )             28.5     04-29    141  |  113<H>  |  12  ----------------------------<  119<H>  5.0   |  18<L>  |  1.54<H>    Ca    8.6      29 Apr 2020 05:10  Phos  2.1     04-29  Mg     1.7     04-29    TPro  6.1  /  Alb  3.0<L>  /  TBili  0.4  /  DBili  x   /  AST  20  /  ALT  14  /  AlkPhos  78  04-29    PT/INR - ( 29 Apr 2020 07:30 )   PT: 17.6 SEC;   INR: 1.51          PTT - ( 29 Apr 2020 07:30 )  PTT:33.5 SEC            RADIOLOGY & ADDITIONAL TESTS:  Results Reviewed:   Imaging Personally Reviewed:  Electrocardiogram Personally Reviewed:    COORDINATION OF CARE:  Care Discussed with Consultants/Other Providers [Y/N]:  Prior or Outpatient Records Reviewed [Y/N]:

## 2020-04-29 NOTE — PROGRESS NOTE ADULT - PROBLEM SELECTOR PLAN 2
- Cr 2.28, up from Cr 1.3 few weeks ago. Possibly volume depletion for GIB  - Slowly improving after IVF and PRBC transfusion  - Monitor Cr daily, f/u urine lytes, monitor electrolytes.   - Avoid nephrotoxic agents, Renally dose meds.  - If Cr continues to uptrend, consider renal consult  - Start on sodium bicarb 650mg BID given metabolic acidosis and CKD - Cr 2.28, up from Cr 1.3 few weeks ago. Possibly volume depletion for GIB  - Slowly improving after IVF and PRBC transfusion  - Monitor Cr daily, f/u urine lytes, monitor electrolytes.   - Avoid nephrotoxic agents, Renally dose meds.  - If Cr continues to uptrend, consider renal consult  - Start on sodium bicarb 650mg BID given metabolic acidosis and CKD- will curbside renal about this for d/c in am

## 2020-04-29 NOTE — PROGRESS NOTE ADULT - ASSESSMENT
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 Porcelain Gallbladder, known R Rectus Sheath Mass, Liver lesion and R Renal pole lesions of unclear etiology, SMA stenosis, Pancreatitis s/p ERCP, GI bleeding 2/2 hemorrhoids, colonic AVMs (s/p cauterization) who presents with black tarry stools, admitted for further management of acute blood loss anemia. 79M patient w/ PMH of HTN, CKD3, distant GBS, Decompensated Alcoholic Cirrhosis (now sober, off transplant list at 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 Porcelain Gallbladder, known R Rectus Sheath Mass, Liver lesion and R Renal pole lesions of unclear etiology, SMA stenosis, Pancreatitis s/p ERCP, GI bleeding 2/2 hemorrhoids, colonic AVMs (s/p cauterization) who presented with black tarry stools-colonoscopy showed non bleeding rectal varices, vascular ectasias in ascending and descending colon that were ablated.

## 2020-04-29 NOTE — DISCHARGE NOTE PROVIDER - NSDCCPCAREPLAN_GEN_ALL_CORE_FT
PRINCIPAL DISCHARGE DIAGNOSIS  Diagnosis: GIB (gastrointestinal bleeding)  Assessment and Plan of Treatment: You received 2 units of blood. You also underwent an endoscopy and colonoscopy. Your endoscopy did not show any active bleeding. Your colonoscopy you had 2 ectasias in the colon, which were ablated. Outpatient follow up with gastroenterology within 1-2 weeks of discharge from hospital      SECONDARY DISCHARGE DIAGNOSES  Diagnosis: Symptomatic anemia  Assessment and Plan of Treatment: Follow-up with your outpatient provider if further treatment is warranted. Monitor for signs/symptoms indicating worsening of disease, such as, easy bleeding/bruising, pale skin, fatigue, dizziness, increased heart rate, or chest pain.    Diagnosis: Acute kidney injury superimposed on CKD  Assessment and Plan of Treatment: In order to prevent further disease progression, continue to follow recommendations made by your primary provider/nephrologist. Continue a diet that is low in sodium and avoid foods that are concentrated in potassium and phosphorus. Continue your medications/supplementations as directed and avoid over-the-counter drugs that are harmful to kidneys, such as, Non-Steroidal Anti-Inflammatory Drugs (NSAIDs). Follow-up as outpatient to monitor your kidney function, as well as, vitamin D, Calcium, potassium, and phosphorus levels.    Diagnosis: Decompensated liver disease  Assessment and Plan of Treatment: Continue medications as prescribed. Outpatient follow up with your hepatologist (Liver doctor).

## 2020-04-29 NOTE — DISCHARGE NOTE PROVIDER - CARE PROVIDERS DIRECT ADDRESSES
[email or web address]

## 2020-04-29 NOTE — DISCHARGE NOTE PROVIDER - NSDCHHNEEDSERVICE_GEN_ALL_CORE
Teaching and training/Rehabilitation services Medication teaching and assessment/Rehabilitation services/Teaching and training

## 2020-04-29 NOTE — DISCHARGE NOTE PROVIDER - NSDCCAREPROVSEEN_GEN_ALL_CORE_FT
[facility] Medicine ACP, Team  Neftaly Mckeon [facility] Medicine ACP, Team  Rosie Vickers

## 2020-04-29 NOTE — DISCHARGE NOTE PROVIDER - CARE PROVIDER_API CALL
Richie Mcgraw)  Internal Medicine  [address] [place] [address]  Phone: [phone]  Fax: [phone]  Follow Up Time:     Neftaly Mckeon)  Gastroenterology; Internal Medicine  [address] [place] [address] [address]  [place] [address]  Phone: [phone]  Fax: [phone]  Follow Up Time:

## 2020-04-29 NOTE — DISCHARGE NOTE PROVIDER - NSDCFUSCHEDAPPT_GEN_ALL_CORE_FT
KETAN TIPTON ; 05/14/2020 ; [facility] KETNA TIPTON ; 05/14/2020 ; [facility]

## 2020-04-29 NOTE — PRE-OP CHECKLIST - SPO2 (%)
Patient notified of results and Isabelle recommendation. Service to sent. Patient denied having blood donation.   
95
100

## 2020-04-29 NOTE — DISCHARGE NOTE PROVIDER - HOSPITAL COURSE
80 year old Male patient w/ PMH of HTN, CKD3, GERD with Dyspepsia/H. 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         Hospital course:         Acute blood loss anemia: Presented with melena: Hb 5.3 on admission, s/p 2U PRBCs. GI consulted- s/p octreotide and protonix gtt, IV CTX. S/p EGD 4/28 without source of bleeding identified. Bx:Oxyntic mucosa with mild inactive chronic gastritis and proton pump inhibitor effect.No Helicobacter organisms are seen on special stain (WSS).4/29: colonoscopy: Non-bleeding rectal varices were seen. There was a vascular ectasia in the ascending/descending colon. APC was applied ablating it.            Acute kidney injury superimposed on CKD: improving s/p IVF and blood transfusion. Sodium bicarb tabs. Possibly volume depletion for GIB given metabolic acidosis and CKD.        Hyperkalemia: resolved after medical mgmt.         Decompensated liver disease: Currently sober, off transplant list at [facility] as improved. MELD score 19-20. 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 Hepatic Encephalopathy on Lactulose. Negative HCC work up as of 4/19, no ascites seen on CT scan 4/2/2020. Hepatology  consulted GI Dr. Richie Mcgraw [phone].         On_________, discussed with __________, patient is medically cleared and optimized for discharge today. All medications were reviewed with attending, and sent to mutually agreed upon pharmacy. 80 year old Male patient w/ PMH of HTN, CKD3, GERD with Dyspepsia/H. 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 Orthostatics noted to be positive and patient placed on IVF and BP reassessed, fall precautions maintained.        Hospital course:         Acute blood loss anemia: Presented with melena: Hb 5.3 on admission, s/p 2U PRBCs. GI consulted- s/p octreotide and protonix gtt, IV CTX. S/p EGD 4/28 without source of bleeding identified. Bx:Oxyntic mucosa with mild inactive chronic gastritis and proton pump inhibitor effect.No Helicobacter organisms are seen on special stain (WSS).4/29: colonoscopy: Non-bleeding rectal varices were seen. There was a vascular ectasia in the ascending/descending colon. APC was applied ablating it.            Acute kidney injury superimposed on CKD: improving s/p IVF and blood transfusion. Sodium bicarb tabs. Possibly volume depletion for GIB given metabolic acidosis and CKD.        Hyperkalemia: resolved after medical mgmt.         Decompensated liver disease: Currently sober, off transplant list at [facility] as improved. MELD score 19-20. 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 Hepatic Encephalopathy on Lactulose. Negative HCC work up as of 4/19, no ascites seen on CT scan 4/2/2020. Hepatology  consulted GI Dr. Richie Mcgraw [phone].         On_________, discussed with __________, patient is medically cleared and optimized for discharge today. All medications were reviewed with attending, and sent to mutually agreed upon pharmacy. 80 year old Male patient w/ PMH of HTN, CKD3, GERD with Dyspepsia/H. Pylori (10/2019), distant GBS, Decompensated Alcoholic Cirrhosis (now sober, off transplant list at 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 Orthostatics noted to be positive and patient placed on IVF and BP reassessed, fall precautions maintained.        Hospital course:         Acute blood loss anemia: Presented with melena: Hb 5.3 on admission, s/p 2U PRBCs. GI consulted- s/p octreotide and protonix gtt, IV CTX. S/p EGD 4/28 without source of bleeding identified. Bx:Oxyntic mucosa with mild inactive chronic gastritis and proton pump inhibitor effect.No Helicobacter organisms are seen on special stain (WSS).4/29: colonoscopy: Non-bleeding rectal varices were seen. There was a vascular ectasia in the ascending/descending colon. APC was applied ablating it.            Acute kidney injury superimposed on CKD: improving s/p IVF and blood transfusion. Sodium bicarb tabs. Possibly volume depletion for GIB given metabolic acidosis and CKD.        Hyperkalemia: resolved after medical mgmt.         Decompensated liver disease: Currently sober, off transplant list at [facility] as improved. MELD score 19-20. 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 Hepatic Encephalopathy on Lactulose. Negative HCC work up as of 4/19, no ascites seen on CT scan 4/2/2020. Hepatology  consulted GI Dr. Richie Mcgraw [phone].         On 5/2/2020, discussed with [place], patient is medically cleared and optimized for discharge today. All medications were reviewed with attending, and sent to mutually agreed upon pharmacy. 80 year old Male patient w/ PMH of HTN, CKD3, GERD with Dyspepsia/H. Pylori (10/2019), distant GBS, Decompensated Alcoholic Cirrhosis (now sober, off transplant list at [facility] as improved, w/ Small Grade 1 Esophogeal Varices (EGD 9/19) w/ hx Esophageal Bleeding s/p banding 2018 on Propanolol, hx of Hepatic Encephalopathy on Lactulose, Negative HCC w/u as of 4/19, no ascites of 10/19), Porcelain Gallbladder, known R Rectus Sheath Mass, Liver lesion and R Renal pole lesions of unclear etiology, SMA stenosis, Pancreatitis s/p ERCP, GI bleeding 2/2 hemorrhoids, colonic AVMs (s/p cauterization) who presents with black tarry stools, admitted for further management of acute blood loss anemia. Orthostatics noted to be positive and patient placed on IVF and BP reassessed, fall precautions maintained.        Hospital course:         Acute blood loss anemia: Presented with melena: Hb 5.3 on admission, s/p 2U PRBCs. GI consulted- s/p octreotide and protonix gtt, IV CTX. S/p EGD 4/28 without source of bleeding identified. Bx:Oxyntic mucosa with mild inactive chronic gastritis and proton pump inhibitor effect.No Helicobacter organisms are seen on special stain (WSS).4/29: colonoscopy: Non-bleeding rectal varices were seen. There was a vascular ectasia in the ascending/descending colon. APC was applied ablating it.            Acute kidney injury superimposed on CKD: improving s/p IVF and blood transfusion. Possibly volume depletion for GIB given metabolic acidosis and CKD.        Hyperkalemia: resolved after medical mgmt. Likely 2/2 KAY on CKD.         Decompensated liver disease: Currently sober, off transplant list at [facility] as improved. MELD score 19-20. 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 Hepatic Encephalopathy on Lactulose. Negative HCC work up as of 4/19, no ascites seen on CT scan 4/2/2020. Hepatology  consulted GI Dr. Richie Mcgraw [phone].         On 5/2/2020, discussed with [place], patient is medically cleared and optimized for discharge today. All medications were reviewed with attending, and sent to mutually agreed upon pharmacy. PT recommended home with home PT, patient and wife declined home services.

## 2020-04-29 NOTE — PROGRESS NOTE ADULT - SUBJECTIVE AND OBJECTIVE BOX
Chief Complaint:  Patient is a 80y old  Male who presents with a chief complaint of GI bleeding, Acute blood loss anemia. (28 Apr 2020 13:21)      Interval Events: No further overt GI bleeding. Denies abdominal pain, n/v.    Allergies:  No Known Allergies      Hospital Medications:  acetaminophen   Tablet .. 650 milliGRAM(s) Oral every 6 hours PRN  cefTRIAXone   IVPB 1000 milliGRAM(s) IV Intermittent every 24 hours  lactulose Syrup 15 Gram(s) Oral two times a day  octreotide  Infusion 25 MICROgram(s)/Hr IV Continuous <Continuous>  pantoprazole Infusion 8 mG/Hr IV Continuous <Continuous>  potassium phosphate / sodium phosphate powder 1 Packet(s) Oral once  sodium bicarbonate 650 milliGRAM(s) Oral two times a day  sucralfate 1 Gram(s) Oral four times a day      PMHX/PSHX:  Porcelain gallbladder  Liver cirrhosis  Guillain-Woodland syndrome  HTN (hypertension)  H/O inguinal hernia repair      Family history:  Family history of ovarian cancer (Sibling)      ROS:     General:  No wt loss, fevers, chills, night sweats, fatigue,   Eyes:  Good vision, no reported pain  ENT:  No sore throat, pain, runny nose, dysphagia  CV:  No pain, palpitations, hypo/hypertension  Resp:  No dyspnea, cough, tachypnea, wheezing  GI:  See HPI  :  No pain, bleeding, incontinence, nocturia  Muscle:  No pain, weakness  Neuro:  No weakness, tingling, memory problems  Psych:  No fatigue, insomnia, mood problems, depression  Endocrine:  No polyuria, polydipsia, cold/heat intolerance  Heme:  No petechiae, ecchymosis, easy bruisability  Skin:  No rash, edema      PHYSICAL EXAM:     Vital Signs:  Vital Signs Last 24 Hrs  T(C): 36.7 (29 Apr 2020 09:50), Max: 37.2 (29 Apr 2020 02:29)  T(F): 98 (29 Apr 2020 09:50), Max: 98.9 (29 Apr 2020 02:29)  HR: 64 (29 Apr 2020 09:50) (55 - 100)  BP: 145/75 (29 Apr 2020 09:50) (116/84 - 145/75)  BP(mean): --  RR: 17 (29 Apr 2020 09:50) (16 - 19)  SpO2: 100% (29 Apr 2020 09:50) (95% - 100%)  Daily Height in cm: 175.2 (29 Apr 2020 07:31)    Daily     GENERAL:  appears comfortable, no acute distress  HEENT:  NC/AT,  conjunctivae clear, sclera -anicteric  CHEST:  no increased effort  HEART:  Regular rate and rhythm  ABDOMEN:  Soft, non-tender, non-distended,  no masses ,no hepato-splenomegaly,   EXTREMITIES:  no cyanosis, clubbing or edema  SKIN:  No rash/erythema/ecchymoses/petechiae/wounds  NEURO:  Alert, oriented    LABS:                        8.9    5.98  )-----------( 202      ( 29 Apr 2020 05:10 )             28.5     04-29    141  |  113<H>  |  12  ----------------------------<  119<H>  5.0   |  18<L>  |  1.54<H>    Ca    8.6      29 Apr 2020 05:10  Phos  2.1     04-29  Mg     1.7     04-29    TPro  6.1  /  Alb  3.0<L>  /  TBili  0.4  /  DBili  x   /  AST  20  /  ALT  14  /  AlkPhos  78  04-29    LIVER FUNCTIONS - ( 29 Apr 2020 05:10 )  Alb: 3.0 g/dL / Pro: 6.1 g/dL / ALK PHOS: 78 u/L / ALT: 14 u/L / AST: 20 u/L / GGT: x           PT/INR - ( 29 Apr 2020 07:30 )   PT: 17.6 SEC;   INR: 1.51          PTT - ( 29 Apr 2020 07:30 )  PTT:33.5 SEC        Imaging:  < from: Colonoscopy (04.29.20 @ 07:59) >  Findings:       Colonoscopy:       -The perianal and digital rectal examinations were normal.       -Non-bleeding rectal varices were seen.       -There was a vascular ectasia in the descending colon. APC was applied        ablating it.       -There was a vascular ectasia in the ascending colon. APC was applied        ablating it.                                                                                   Impression:          Colonoscopy:                       -The perianal and digital rectal examinations were                        normal.                       -Non-bleeding rectal varices were seen.                       -There was a vascular ectasia in the descending colon.                        APC was applied ablating it.                  -There was a vascular ectasia in the ascending colon.                        APC was applied ablating it.  Recommendation:      - [facility].                               < end of copied text >

## 2020-04-29 NOTE — DISCHARGE NOTE PROVIDER - NSDCMRMEDTOKEN_GEN_ALL_CORE_FT
acetaminophen 500 mg oral tablet: 1 tab(s) orally every 6 hours, As needed, Mild Pain (1 - 3), Moderate Pain (4 - 6), Severe Pain (7 - 10)  Calcium 500+D: 1 tab(s) orally once a day  lactulose 10 g/15 mL oral syrup: 22.5 milliliter(s) orally 2 times a day  pantoprazole 40 mg oral delayed release tablet: 1 tab(s) orally 2 times a day  propranolol 10 mg oral tablet: 1 tab(s) orally 2 times a day  sucralfate 1 g oral tablet: 1 tab(s) orally 4 times a day acetaminophen 500 mg oral tablet: 1 tab(s) orally every 6 hours, As needed, Mild Pain (1 - 3), Moderate Pain (4 - 6), Severe Pain (7 - 10)  Calcium 500+D: 1 tab(s) orally once a day  lactulose 10 g/15 mL oral syrup: 22.5 milliliter(s) orally 2 times a day  pantoprazole 40 mg oral delayed release tablet: 1 tab(s) orally 2 times a day  sucralfate 1 g oral tablet: 1 tab(s) orally 4 times a day

## 2020-04-29 NOTE — PRE-OP CHECKLIST - NOTHING BY MOUTH SINCE
----- Message from Steffanie AGUIRRE MD sent at 9/21/2017 11:13 PM EDT -----  THyroid function is normal.  
29-Apr-2020 20:00
28-Apr-2020 00:00

## 2020-04-29 NOTE — PROGRESS NOTE ADULT - PROBLEM SELECTOR PLAN 5
- DVT ppx: no chemic ppx due to active GI bleeding, SCDs for now.   - Diet: NPO after midnight for colonoscopy   - Activity: as tolerated.   - Dispo: pending hospital course. - DVT ppx: no chemic ppx due to active GI bleeding, SCDs for now.   - PT eval

## 2020-04-29 NOTE — PROGRESS NOTE ADULT - PROBLEM SELECTOR PLAN 1
S/p EGD 4/2/2020 with grade I esophageal varices and multiple non-bleeding duodenal ulcers with pigmented material treated with argon plasma coagulation (APC). Biopsies with no evidence of H. pylori. Possible variceal vs ulcer bleed.   - Acute blood loss anemia with melena: Hb 5.3 on admission, s/p 2U PRBCs  - Currently hemodynamically stable.   - Monitor H/H closely, trend Hb q6h w/ goal >7, maintain active T&S.   - GI recs appreciated: c/w PPI gtt, octreotide gtt, Ceftriaxone 1g x 7 days  - hold propranolol for now  - S/p EGD today without source of bleeding identified. Plan for colonoscopy tomorrow. S/p EGD 4/2/2020 with grade I esophageal varices and multiple non-bleeding duodenal ulcers with pigmented material treated with argon plasma coagulation (APC). Biopsies with no evidence of H. pylori. Possible variceal vs ulcer bleed.   - Acute blood loss anemia with melena: Hb 5.3 on admission, s/p 2U PRBCs  - s/p 2 units prbc- hgb stable for now   - f/u hepatology recs

## 2020-04-30 DIAGNOSIS — R42 DIZZINESS AND GIDDINESS: ICD-10-CM

## 2020-04-30 LAB
ALBUMIN SERPL ELPH-MCNC: 3.1 G/DL — LOW (ref 3.3–5)
ALP SERPL-CCNC: 85 U/L — SIGNIFICANT CHANGE UP (ref 40–120)
ALT FLD-CCNC: 14 U/L — SIGNIFICANT CHANGE UP (ref 4–41)
ANION GAP SERPL CALC-SCNC: 8 MMO/L — SIGNIFICANT CHANGE UP (ref 7–14)
AST SERPL-CCNC: 19 U/L — SIGNIFICANT CHANGE UP (ref 4–40)
BASOPHILS # BLD AUTO: 0.07 K/UL — SIGNIFICANT CHANGE UP (ref 0–0.2)
BASOPHILS NFR BLD AUTO: 1.2 % — SIGNIFICANT CHANGE UP (ref 0–2)
BILIRUB SERPL-MCNC: 0.3 MG/DL — SIGNIFICANT CHANGE UP (ref 0.2–1.2)
BUN SERPL-MCNC: 9 MG/DL — SIGNIFICANT CHANGE UP (ref 7–23)
CALCIUM SERPL-MCNC: 8.7 MG/DL — SIGNIFICANT CHANGE UP (ref 8.4–10.5)
CHLORIDE SERPL-SCNC: 109 MMOL/L — HIGH (ref 98–107)
CO2 SERPL-SCNC: 20 MMOL/L — LOW (ref 22–31)
CREAT SERPL-MCNC: 1.49 MG/DL — HIGH (ref 0.5–1.3)
EOSINOPHIL # BLD AUTO: 0.99 K/UL — HIGH (ref 0–0.5)
EOSINOPHIL NFR BLD AUTO: 16.6 % — HIGH (ref 0–6)
GLUCOSE SERPL-MCNC: 124 MG/DL — HIGH (ref 70–99)
HCT VFR BLD CALC: 28.6 % — LOW (ref 39–50)
HGB BLD-MCNC: 8.7 G/DL — LOW (ref 13–17)
IMM GRANULOCYTES NFR BLD AUTO: 0.3 % — SIGNIFICANT CHANGE UP (ref 0–1.5)
LYMPHOCYTES # BLD AUTO: 1 K/UL — SIGNIFICANT CHANGE UP (ref 1–3.3)
LYMPHOCYTES # BLD AUTO: 16.7 % — SIGNIFICANT CHANGE UP (ref 13–44)
MAGNESIUM SERPL-MCNC: 1.7 MG/DL — SIGNIFICANT CHANGE UP (ref 1.6–2.6)
MCHC RBC-ENTMCNC: 26.1 PG — LOW (ref 27–34)
MCHC RBC-ENTMCNC: 30.4 % — LOW (ref 32–36)
MCV RBC AUTO: 85.9 FL — SIGNIFICANT CHANGE UP (ref 80–100)
MONOCYTES # BLD AUTO: 0.45 K/UL — SIGNIFICANT CHANGE UP (ref 0–0.9)
MONOCYTES NFR BLD AUTO: 7.5 % — SIGNIFICANT CHANGE UP (ref 2–14)
NEUTROPHILS # BLD AUTO: 3.45 K/UL — SIGNIFICANT CHANGE UP (ref 1.8–7.4)
NEUTROPHILS NFR BLD AUTO: 57.7 % — SIGNIFICANT CHANGE UP (ref 43–77)
NRBC # FLD: 0 K/UL — SIGNIFICANT CHANGE UP (ref 0–0)
PHOSPHATE SERPL-MCNC: 2 MG/DL — LOW (ref 2.5–4.5)
PLATELET # BLD AUTO: 197 K/UL — SIGNIFICANT CHANGE UP (ref 150–400)
PMV BLD: 10 FL — SIGNIFICANT CHANGE UP (ref 7–13)
POTASSIUM SERPL-MCNC: 4.8 MMOL/L — SIGNIFICANT CHANGE UP (ref 3.5–5.3)
POTASSIUM SERPL-SCNC: 4.8 MMOL/L — SIGNIFICANT CHANGE UP (ref 3.5–5.3)
PROT SERPL-MCNC: 6.3 G/DL — SIGNIFICANT CHANGE UP (ref 6–8.3)
RBC # BLD: 3.33 M/UL — LOW (ref 4.2–5.8)
RBC # FLD: 16.4 % — HIGH (ref 10.3–14.5)
SODIUM SERPL-SCNC: 137 MMOL/L — SIGNIFICANT CHANGE UP (ref 135–145)
WBC # BLD: 5.98 K/UL — SIGNIFICANT CHANGE UP (ref 3.8–10.5)
WBC # FLD AUTO: 5.98 K/UL — SIGNIFICANT CHANGE UP (ref 3.8–10.5)

## 2020-04-30 PROCEDURE — 99233 SBSQ HOSP IP/OBS HIGH 50: CPT

## 2020-04-30 RX ORDER — LACTULOSE 10 G/15ML
15 SOLUTION ORAL EVERY 6 HOURS
Refills: 0 | Status: DISCONTINUED | OUTPATIENT
Start: 2020-04-30 | End: 2020-05-02

## 2020-04-30 RX ORDER — POTASSIUM PHOSPHATE, MONOBASIC POTASSIUM PHOSPHATE, DIBASIC 236; 224 MG/ML; MG/ML
30 INJECTION, SOLUTION INTRAVENOUS ONCE
Refills: 0 | Status: COMPLETED | OUTPATIENT
Start: 2020-04-30 | End: 2020-04-30

## 2020-04-30 RX ORDER — SODIUM CHLORIDE 9 MG/ML
1000 INJECTION INTRAMUSCULAR; INTRAVENOUS; SUBCUTANEOUS
Refills: 0 | Status: DISCONTINUED | OUTPATIENT
Start: 2020-04-30 | End: 2020-05-01

## 2020-04-30 RX ADMIN — SODIUM CHLORIDE 75 MILLILITER(S): 9 INJECTION INTRAMUSCULAR; INTRAVENOUS; SUBCUTANEOUS at 15:12

## 2020-04-30 RX ADMIN — LACTULOSE 15 GRAM(S): 10 SOLUTION ORAL at 18:56

## 2020-04-30 RX ADMIN — POTASSIUM PHOSPHATE, MONOBASIC POTASSIUM PHOSPHATE, DIBASIC 83.33 MILLIMOLE(S): 236; 224 INJECTION, SOLUTION INTRAVENOUS at 12:08

## 2020-04-30 RX ADMIN — Medication 650 MILLIGRAM(S): at 05:46

## 2020-04-30 RX ADMIN — LACTULOSE 15 GRAM(S): 10 SOLUTION ORAL at 12:08

## 2020-04-30 RX ADMIN — Medication 1 GRAM(S): at 23:05

## 2020-04-30 RX ADMIN — Medication 1 GRAM(S): at 18:56

## 2020-04-30 RX ADMIN — Medication 1 GRAM(S): at 12:08

## 2020-04-30 RX ADMIN — LACTULOSE 15 GRAM(S): 10 SOLUTION ORAL at 23:05

## 2020-04-30 RX ADMIN — CEFTRIAXONE 100 MILLIGRAM(S): 500 INJECTION, POWDER, FOR SOLUTION INTRAMUSCULAR; INTRAVENOUS at 05:45

## 2020-04-30 RX ADMIN — LACTULOSE 15 GRAM(S): 10 SOLUTION ORAL at 05:46

## 2020-04-30 RX ADMIN — PANTOPRAZOLE SODIUM 40 MILLIGRAM(S): 20 TABLET, DELAYED RELEASE ORAL at 05:46

## 2020-04-30 RX ADMIN — Medication 1 GRAM(S): at 05:46

## 2020-04-30 RX ADMIN — PANTOPRAZOLE SODIUM 40 MILLIGRAM(S): 20 TABLET, DELAYED RELEASE ORAL at 18:56

## 2020-04-30 NOTE — PROGRESS NOTE ADULT - SUBJECTIVE AND OBJECTIVE BOX
[facility] Division of Hospital Medicine  Taryn Jhaveri MD  Pager [phone]    Patient is a 80y old  Male who presents with a chief complaint of GI bleeding, Acute blood loss anemia. (29 Apr 2020 17:36)      SUBJECTIVE / OVERNIGHT EVENTS: pt seen and examined at 1150a- pt said he felt dizzy when he stood up; pt wanted to stay in the hospital till he got better- explained that we don't need to keep in house to get completely better- he agreed to home w/ PT eventually; no bm in 2 days; asking for regular food    was on the phone w/ the wife informed her of orthostatics  d/w NORMA Rico at 330p- re: positive orthostatics  ADDITIONAL REVIEW OF SYSTEMS:    MEDICATIONS  (STANDING):  cefTRIAXone   IVPB 1000 milliGRAM(s) IV Intermittent every 24 hours  lactulose Syrup 15 Gram(s) Oral every 6 hours  pantoprazole    Tablet 40 milliGRAM(s) Oral two times a day  sodium bicarbonate 650 milliGRAM(s) Oral two times a day  sodium chloride 0.9%. 1000 milliLiter(s) (75 mL/Hr) IV Continuous <Continuous>  sucralfate 1 Gram(s) Oral four times a day    MEDICATIONS  (PRN):  acetaminophen   Tablet .. 650 milliGRAM(s) Oral every 6 hours PRN Temp greater or equal to 38C (100.4F), Mild Pain (1 - 3)      CAPILLARY BLOOD GLUCOSE        I&O's Summary    29 Apr 2020 07:01  -  30 Apr 2020 07:00  --------------------------------------------------------  IN: 860 mL / OUT: 700 mL / NET: 160 mL    30 Apr 2020 07:01  -  30 Apr 2020 17:36  --------------------------------------------------------  IN: 0 mL / OUT: 300 mL / NET: -300 mL        PHYSICAL EXAM:  Vital Signs Last 24 Hrs  T(C): 36.8 (30 Apr 2020 15:11), Max: 37.1 (29 Apr 2020 21:48)  T(F): 98.3 (30 Apr 2020 15:11), Max: 98.7 (29 Apr 2020 21:48)  HR: 65 (30 Apr 2020 15:11) (64 - 72)  BP: 137/65 (30 Apr 2020 15:11) (137/65 - 160/64)  BP(mean): --  RR: 18 (30 Apr 2020 15:11) (18 - 18)  SpO2: 100% (30 Apr 2020 15:11) (100% - 100%)  CONSTITUTIONAL: NAD,  RESPIRATORY: lungs are clear to auscultation bilaterally  CARDIOVASCULAR: Regular rate and rhythm, normal S1 and S2; No lower extremity edema;   ABDOMEN: Nontender to palpation, normoactive bowel sounds, not distended;       LABS:                        8.7    5.98  )-----------( 197      ( 30 Apr 2020 06:10 )             28.6     04-30    137  |  109<H>  |  9   ----------------------------<  124<H>  4.8   |  20<L>  |  1.49<H>    Ca    8.7      30 Apr 2020 06:10  Phos  2.0     04-30  Mg     1.7     04-30    TPro  6.3  /  Alb  3.1<L>  /  TBili  0.3  /  DBili  x   /  AST  19  /  ALT  14  /  AlkPhos  85  04-30    PT/INR - ( 29 Apr 2020 07:30 )   PT: 17.6 SEC;   INR: 1.51          PTT - ( 29 Apr 2020 07:30 )  PTT:33.5 SEC            RADIOLOGY & ADDITIONAL TESTS:  Results Reviewed:   Imaging Personally Reviewed:  Electrocardiogram Personally Reviewed:    COORDINATION OF CARE:  Care Discussed with Consultants/Other Providers [Y/N]:  Prior or Outpatient Records Reviewed [Y/N]:

## 2020-04-30 NOTE — PROGRESS NOTE ADULT - PROBLEM SELECTOR PLAN 1
S/p EGD 4/2/2020 with grade I esophageal varices and multiple non-bleeding duodenal ulcers with pigmented material treated with argon plasma coagulation (APC). Biopsies neg for H. pylori. Possible variceal vs ulcer bleed.   - Acute blood loss anemia with melena: Hb 5.3 on admission, s/p 2U PRBCs  - s/p 2 units prbc- hgb stable for now- diet advanced

## 2020-04-30 NOTE — PHYSICAL THERAPY INITIAL EVALUATION ADULT - DISCHARGE DISPOSITION, PT EVAL
anticipating discharge Home with PT services , please follow further PT notes , currently pt with c/o dizziness required more assistance during out of bed activities ,please follow further PT notes.

## 2020-04-30 NOTE — PHYSICAL THERAPY INITIAL EVALUATION ADULT - LIVES WITH, PROFILE
at home , 5 steps to enter , 1 flight of steps to the basement where he lives and daughters lives next door to assist him as needed./spouse

## 2020-04-30 NOTE — PROGRESS NOTE ADULT - PROBLEM SELECTOR PLAN 2
- Cr 2.28, up from Cr 1.3 few weeks ago. Possibly volume depletion for GIB  - Slowly improving after IVF and PRBC transfusion  - Monitor Cr daily, f/u urine lytes, monitor electrolytes.   - Avoid nephrotoxic agents, Renally dose meds.  - If Cr continues to uptrend, consider renal consult  - bicarbonate improving - will d/c sodium bicarb po

## 2020-04-30 NOTE — PROGRESS NOTE ADULT - PROBLEM SELECTOR PLAN 5
said he gets that at home sometimes  +orthostatics  iv fluids  recheck orthostatics in am  possible d/c home in 1-2 days w/ home PT

## 2020-04-30 NOTE — PROGRESS NOTE ADULT - PROBLEM SELECTOR PLAN 3
- Currently sober, off transplant list at [facility] as improved. MELD score 19-20.   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  - Hepatic Encephalopathy on Lactulose. no bm in 2 days- will increase from BID to QID and monitor  - Negative HCC work up as of 4/19, no ascites seen on CT scan 4/2/2020.   - Hepatology GI Dr. Richie Mcgraw [phone].  stopped octreotide and PPI drip- PPI bid; propranolol to be restarted but holding off d/t +orthostatics

## 2020-04-30 NOTE — PROGRESS NOTE ADULT - ASSESSMENT
79M patient w/ PMH of HTN, CKD3, distant GBS, Decompensated Alcoholic Cirrhosis (now sober, off transplant list at [facility] as improved, w/ Small Grade 1 Esophogeal Varices (EGD 9/19) w/ hx Esophageal Bleeding s/p banding 2018. Porcelain Gallbladder, known R Rectus Sheath Mass, Liver lesion and R Renal pole lesions of unclear etiology, SMA stenosis, Pancreatitis s/p ERCP, GI bleeding 2/2 hemorrhoids, colonic AVMs (s/p cauterization) who presented with black tarry stools-colonoscopy showed non bleeding rectal varices, vascular ectasias in ascending and descending colon that were ablated.    pt orthostatic 4/30- will give 1 liter fluids and reassess

## 2020-04-30 NOTE — PHYSICAL THERAPY INITIAL EVALUATION ADULT - PERTINENT HX OF CURRENT PROBLEM, REHAB EVAL
This is an 80y M who presents with black tarry stools, admitted for further management of acute blood loss anemia.

## 2020-05-01 ENCOUNTER — APPOINTMENT (OUTPATIENT)
Dept: HEPATOLOGY | Facility: CLINIC | Age: 80
End: 2020-05-01

## 2020-05-01 LAB
ANION GAP SERPL CALC-SCNC: 11 MMO/L — SIGNIFICANT CHANGE UP (ref 7–14)
BASOPHILS # BLD AUTO: 0.05 K/UL — SIGNIFICANT CHANGE UP (ref 0–0.2)
BASOPHILS NFR BLD AUTO: 0.6 % — SIGNIFICANT CHANGE UP (ref 0–2)
BUN SERPL-MCNC: 11 MG/DL — SIGNIFICANT CHANGE UP (ref 7–23)
CALCIUM SERPL-MCNC: 8.1 MG/DL — LOW (ref 8.4–10.5)
CHLORIDE SERPL-SCNC: 112 MMOL/L — HIGH (ref 98–107)
CO2 SERPL-SCNC: 18 MMOL/L — LOW (ref 22–31)
CREAT SERPL-MCNC: 1.59 MG/DL — HIGH (ref 0.5–1.3)
EOSINOPHIL # BLD AUTO: 0.72 K/UL — HIGH (ref 0–0.5)
EOSINOPHIL NFR BLD AUTO: 9.1 % — HIGH (ref 0–6)
GLUCOSE SERPL-MCNC: 99 MG/DL — SIGNIFICANT CHANGE UP (ref 70–99)
HCT VFR BLD CALC: 25.1 % — LOW (ref 39–50)
HCT VFR BLD CALC: 31.1 % — LOW (ref 39–50)
HGB BLD-MCNC: 7.7 G/DL — LOW (ref 13–17)
HGB BLD-MCNC: 9.6 G/DL — LOW (ref 13–17)
IMM GRANULOCYTES NFR BLD AUTO: 0.4 % — SIGNIFICANT CHANGE UP (ref 0–1.5)
LYMPHOCYTES # BLD AUTO: 1.35 K/UL — SIGNIFICANT CHANGE UP (ref 1–3.3)
LYMPHOCYTES # BLD AUTO: 17.1 % — SIGNIFICANT CHANGE UP (ref 13–44)
MAGNESIUM SERPL-MCNC: 1.4 MG/DL — LOW (ref 1.6–2.6)
MCHC RBC-ENTMCNC: 26.5 PG — LOW (ref 27–34)
MCHC RBC-ENTMCNC: 27 PG — SIGNIFICANT CHANGE UP (ref 27–34)
MCHC RBC-ENTMCNC: 30.7 % — LOW (ref 32–36)
MCHC RBC-ENTMCNC: 30.9 % — LOW (ref 32–36)
MCV RBC AUTO: 86.3 FL — SIGNIFICANT CHANGE UP (ref 80–100)
MCV RBC AUTO: 87.6 FL — SIGNIFICANT CHANGE UP (ref 80–100)
MONOCYTES # BLD AUTO: 0.52 K/UL — SIGNIFICANT CHANGE UP (ref 0–0.9)
MONOCYTES NFR BLD AUTO: 6.6 % — SIGNIFICANT CHANGE UP (ref 2–14)
NEUTROPHILS # BLD AUTO: 5.23 K/UL — SIGNIFICANT CHANGE UP (ref 1.8–7.4)
NEUTROPHILS NFR BLD AUTO: 66.2 % — SIGNIFICANT CHANGE UP (ref 43–77)
NRBC # FLD: 0 K/UL — SIGNIFICANT CHANGE UP (ref 0–0)
NRBC # FLD: 0 K/UL — SIGNIFICANT CHANGE UP (ref 0–0)
PHOSPHATE SERPL-MCNC: 3.2 MG/DL — SIGNIFICANT CHANGE UP (ref 2.5–4.5)
PLATELET # BLD AUTO: 159 K/UL — SIGNIFICANT CHANGE UP (ref 150–400)
PLATELET # BLD AUTO: 194 K/UL — SIGNIFICANT CHANGE UP (ref 150–400)
PMV BLD: 10.3 FL — SIGNIFICANT CHANGE UP (ref 7–13)
PMV BLD: 10.3 FL — SIGNIFICANT CHANGE UP (ref 7–13)
POTASSIUM SERPL-MCNC: 4.8 MMOL/L — SIGNIFICANT CHANGE UP (ref 3.5–5.3)
POTASSIUM SERPL-SCNC: 4.8 MMOL/L — SIGNIFICANT CHANGE UP (ref 3.5–5.3)
RBC # BLD: 2.91 M/UL — LOW (ref 4.2–5.8)
RBC # BLD: 3.55 M/UL — LOW (ref 4.2–5.8)
RBC # FLD: 16.8 % — HIGH (ref 10.3–14.5)
RBC # FLD: 16.8 % — HIGH (ref 10.3–14.5)
SODIUM SERPL-SCNC: 141 MMOL/L — SIGNIFICANT CHANGE UP (ref 135–145)
WBC # BLD: 5.93 K/UL — SIGNIFICANT CHANGE UP (ref 3.8–10.5)
WBC # BLD: 7.9 K/UL — SIGNIFICANT CHANGE UP (ref 3.8–10.5)
WBC # FLD AUTO: 5.93 K/UL — SIGNIFICANT CHANGE UP (ref 3.8–10.5)
WBC # FLD AUTO: 7.9 K/UL — SIGNIFICANT CHANGE UP (ref 3.8–10.5)

## 2020-05-01 PROCEDURE — 99233 SBSQ HOSP IP/OBS HIGH 50: CPT

## 2020-05-01 RX ORDER — MAGNESIUM SULFATE 500 MG/ML
2 VIAL (ML) INJECTION ONCE
Refills: 0 | Status: DISCONTINUED | OUTPATIENT
Start: 2020-05-01 | End: 2020-05-01

## 2020-05-01 RX ORDER — MAGNESIUM SULFATE 500 MG/ML
2 VIAL (ML) INJECTION ONCE
Refills: 0 | Status: COMPLETED | OUTPATIENT
Start: 2020-05-01 | End: 2020-05-01

## 2020-05-01 RX ORDER — SODIUM CHLORIDE 9 MG/ML
500 INJECTION INTRAMUSCULAR; INTRAVENOUS; SUBCUTANEOUS ONCE
Refills: 0 | Status: COMPLETED | OUTPATIENT
Start: 2020-05-01 | End: 2020-05-01

## 2020-05-01 RX ADMIN — LACTULOSE 15 GRAM(S): 10 SOLUTION ORAL at 05:57

## 2020-05-01 RX ADMIN — Medication 50 GRAM(S): at 14:53

## 2020-05-01 RX ADMIN — LACTULOSE 15 GRAM(S): 10 SOLUTION ORAL at 19:08

## 2020-05-01 RX ADMIN — Medication 1 GRAM(S): at 05:57

## 2020-05-01 RX ADMIN — Medication 1 GRAM(S): at 19:07

## 2020-05-01 RX ADMIN — PANTOPRAZOLE SODIUM 40 MILLIGRAM(S): 20 TABLET, DELAYED RELEASE ORAL at 05:57

## 2020-05-01 RX ADMIN — LACTULOSE 15 GRAM(S): 10 SOLUTION ORAL at 14:50

## 2020-05-01 RX ADMIN — Medication 1 GRAM(S): at 14:49

## 2020-05-01 RX ADMIN — SODIUM CHLORIDE 100 MILLILITER(S): 9 INJECTION INTRAMUSCULAR; INTRAVENOUS; SUBCUTANEOUS at 14:52

## 2020-05-01 RX ADMIN — PANTOPRAZOLE SODIUM 40 MILLIGRAM(S): 20 TABLET, DELAYED RELEASE ORAL at 19:07

## 2020-05-01 RX ADMIN — CEFTRIAXONE 100 MILLIGRAM(S): 500 INJECTION, POWDER, FOR SOLUTION INTRAMUSCULAR; INTRAVENOUS at 05:57

## 2020-05-01 NOTE — PROGRESS NOTE ADULT - ASSESSMENT
79M patient w/ PMH of HTN, CKD3, distant GBS, Decompensated Alcoholic Cirrhosis (now sober, off transplant list at [facility] as improved, w/ Small Grade 1 Esophogeal Varices (EGD 9/19) w/ hx Esophageal Bleeding s/p banding 2018. Porcelain Gallbladder, known R Rectus Sheath Mass, Liver lesion and R Renal pole lesions of unclear etiology, SMA stenosis, Pancreatitis s/p ERCP, GI bleeding 2/2 hemorrhoids, colonic AVMs (s/p cauterization) who presented with black tarry stools-colonoscopy showed non bleeding rectal varices, vascular ectasias in ascending and descending colon that were ablated.

## 2020-05-01 NOTE — PROGRESS NOTE ADULT - PROBLEM SELECTOR PLAN 4
- DVT ppx: no chemic ppx due to active GI bleeding, SCDs for now.   - PT eval +orthostatics. s/p 1L NS. Orthostatics positive again this AM, will give another 500cc bolus  - Recheck orthostatics in am  - PT eval

## 2020-05-01 NOTE — PROGRESS NOTE ADULT - SUBJECTIVE AND OBJECTIVE BOX
[facility] Division of Hospital Medicine  Rosie Vickers MD  Pager: [phone]      Patient is a 80y old  Male who presents with a chief complaint of GI bleeding, Acute blood loss anemia. (30 Apr 2020 17:35)      SUBJECTIVE / OVERNIGHT EVENTS: patient seen and examined. c/o weakness this AM and some dizziness. does not feel ready to go home. needs assistance with getting to the bathroom.     MEDICATIONS  (STANDING):  cefTRIAXone   IVPB 1000 milliGRAM(s) IV Intermittent every 24 hours  lactulose Syrup 15 Gram(s) Oral every 6 hours  magnesium sulfate  IVPB 2 Gram(s) IV Intermittent once  pantoprazole    Tablet 40 milliGRAM(s) Oral two times a day  sucralfate 1 Gram(s) Oral four times a day    MEDICATIONS  (PRN):  acetaminophen   Tablet .. 650 milliGRAM(s) Oral every 6 hours PRN Temp greater or equal to 38C (100.4F), Mild Pain (1 - 3)      CAPILLARY BLOOD GLUCOSE        I&O's Summary    30 Apr 2020 07:01  -  01 May 2020 07:00  --------------------------------------------------------  IN: 0 mL / OUT: 1020 mL / NET: -1020 mL        PHYSICAL EXAM:  Vital Signs Last 24 Hrs  T(C): 36.8 (01 May 2020 09:59), Max: 37.1 (01 May 2020 05:54)  T(F): 98.2 (01 May 2020 09:59), Max: 98.7 (01 May 2020 05:54)  HR: 65 (01 May 2020 09:59) (65 - 68)  BP: 130/66 (01 May 2020 09:59) (130/66 - 157/77)  BP(mean): --  RR: 17 (01 May 2020 09:59) (17 - 18)  SpO2: 100% (01 May 2020 09:59) (100% - 100%)    CONSTITUTIONAL: NAD, well-developed, well-groomed  ENMT: Moist oral mucosa  RESPIRATORY: Normal respiratory effort; lungs are clear to auscultation bilaterally  CARDIOVASCULAR: Regular rate and rhythm; No lower extremity edema  ABDOMEN: Nontender to palpation, normoactive bowel sounds, no rebound/guarding  PSYCH: A+O to person, place, and time; affect appropriate  NEUROLOGY: no focal deficits  SKIN: No rashes; no palpable lesions    LABS:                        7.7    5.93  )-----------( 159      ( 01 May 2020 05:35 )             25.1     05-01    141  |  112<H>  |  11  ----------------------------<  99  4.8   |  18<L>  |  1.59<H>    Ca    8.1<L>      01 May 2020 05:35  Phos  3.2     05-01  Mg     1.4     05-01    TPro  6.3  /  Alb  3.1<L>  /  TBili  0.3  /  DBili  x   /  AST  19  /  ALT  14  /  AlkPhos  85  04-30 [facility] Division of Hospital Medicine  Rosie Vickers MD  Pager: [phone]      Patient is a 80y old  Male who presents with a chief complaint of GI bleeding, Acute blood loss anemia. (30 Apr 2020 17:35)      SUBJECTIVE / OVERNIGHT EVENTS: patient seen and examined. c/o weakness this AM and some dizziness. does not feel ready to go home. needs assistance with getting to the bathroom.     MEDICATIONS  (STANDING):  cefTRIAXone   IVPB 1000 milliGRAM(s) IV Intermittent every 24 hours  lactulose Syrup 15 Gram(s) Oral every 6 hours  magnesium sulfate  IVPB 2 Gram(s) IV Intermittent once  pantoprazole    Tablet 40 milliGRAM(s) Oral two times a day  sucralfate 1 Gram(s) Oral four times a day    MEDICATIONS  (PRN):  acetaminophen   Tablet .. 650 milliGRAM(s) Oral every 6 hours PRN Temp greater or equal to 38C (100.4F), Mild Pain (1 - 3)        I&O's Summary    30 Apr 2020 07:01  -  01 May 2020 07:00  --------------------------------------------------------  IN: 0 mL / OUT: 1020 mL / NET: -1020 mL        PHYSICAL EXAM:  Vital Signs Last 24 Hrs  T(C): 36.8 (01 May 2020 09:59), Max: 37.1 (01 May 2020 05:54)  T(F): 98.2 (01 May 2020 09:59), Max: 98.7 (01 May 2020 05:54)  HR: 65 (01 May 2020 09:59) (65 - 68)  BP: 130/66 (01 May 2020 09:59) (130/66 - 157/77)  BP(mean): --  RR: 17 (01 May 2020 09:59) (17 - 18)  SpO2: 100% (01 May 2020 09:59) (100% - 100%)    CONSTITUTIONAL: NAD, well-developed, well-groomed  ENMT: Moist oral mucosa  RESPIRATORY: Normal respiratory effort; lungs are clear to auscultation bilaterally  CARDIOVASCULAR: Regular rate and rhythm; No lower extremity edema  ABDOMEN: Nontender to palpation, normoactive bowel sounds, no rebound/guarding  PSYCH: A+O to person, place, and time; affect appropriate  NEUROLOGY: no focal deficits  SKIN: No rashes; no palpable lesions    LABS:                        7.7    5.93  )-----------( 159      ( 01 May 2020 05:35 )             25.1     05-01    141  |  112<H>  |  11  ----------------------------<  99  4.8   |  18<L>  |  1.59<H>    Ca    8.1<L>      01 May 2020 05:35  Phos  3.2     05-01  Mg     1.4     05-01    TPro  6.3  /  Alb  3.1<L>  /  TBili  0.3  /  DBili  x   /  AST  19  /  ALT  14  /  AlkPhos  85  04-30

## 2020-05-01 NOTE — PROGRESS NOTE ADULT - PROBLEM SELECTOR PLAN 1
S/p EGD 4/2/2020 with grade I esophageal varices and multiple non-bleeding duodenal ulcers with pigmented material treated with argon plasma coagulation (APC). Biopsies neg for H. pylori. Possible variceal vs ulcer bleed.   - Acute blood loss anemia with melena: Hb 5.3 on admission, s/p 2U PRBCs  - s/p 2 units prbc- hgb stable for now- diet advanced - Acute blood loss anemia with melena: Hb 5.3 on admission, s/p 2U PRBCs  - Secondary to likely colon AVMs now s/p APC. EGD negative for active bleeding.  - GI recs appreciated  - Monitor H/H, will recheck CBC this afternoon as Hgb 7.7 this am from 8.7  - To complete 7 days of antibiotics tomorrow

## 2020-05-01 NOTE — PROGRESS NOTE ADULT - PROBLEM SELECTOR PLAN 5
said he gets that at home sometimes  +orthostatics  iv fluids  recheck orthostatics in am  possible d/c home in 1-2 days w/ home PT - DVT ppx: no chemic ppx due to active GI bleeding, SCDs for now.   - PT eval: home w/ home PT   - Dispo: likely home tomorrow

## 2020-05-01 NOTE — PROGRESS NOTE ADULT - PROBLEM SELECTOR PLAN 2
- Cr 2.28, up from Cr 1.3 few weeks ago. Possibly volume depletion for GIB  - Slowly improving after IVF and PRBC transfusion  - Monitor Cr daily, f/u urine lytes, monitor electrolytes.   - Avoid nephrotoxic agents, Renally dose meds.  - If Cr continues to uptrend, consider renal consult  - bicarbonate improving - will d/c sodium bicarb po - Cr 2.28, up from Cr 1.3 few weeks ago. Possibly volume depletion for GIB  - Slowly improving after IVF and PRBC transfusion  - Monitor Cr daily, f/u urine lytes, monitor electrolytes.   - Avoid nephrotoxic agents, Renally dose meds.

## 2020-05-01 NOTE — PROGRESS NOTE ADULT - PROBLEM SELECTOR PLAN 3
- Currently sober, off transplant list at [facility] as improved. MELD score 19-20.   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  - Hepatic Encephalopathy on Lactulose. no bm in 2 days- will increase from BID to QID and monitor  - Negative HCC work up as of 4/19, no ascites seen on CT scan 4/2/2020.   - Hepatology GI Dr. Richie Mcgraw [phone].  stopped octreotide and PPI drip- PPI bid; propranolol to be restarted but holding off d/t +orthostatics - Currently sober, off transplant list at [facility] as improved. MELD score 19-20.   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  - Hepatic Encephalopathy on Lactulose. no bm in 2 days- will increase from BID to QID and monitor  - Negative HCC work up as of 4/19, no ascites seen on CT scan 4/2/2020.   - Hepatology GI Dr. Richie Mcgraw [phone].  stopped octreotide and PPI drip- PPI bid  - Propranolol to be restarted but holding off d/t +orthostatics

## 2020-05-02 ENCOUNTER — TRANSCRIPTION ENCOUNTER (OUTPATIENT)
Age: 80
End: 2020-05-02

## 2020-05-02 VITALS
TEMPERATURE: 98 F | OXYGEN SATURATION: 100 % | DIASTOLIC BLOOD PRESSURE: 60 MMHG | HEART RATE: 71 BPM | RESPIRATION RATE: 17 BRPM | SYSTOLIC BLOOD PRESSURE: 138 MMHG

## 2020-05-02 LAB
ANION GAP SERPL CALC-SCNC: 9 MMO/L — SIGNIFICANT CHANGE UP (ref 7–14)
BUN SERPL-MCNC: 15 MG/DL — SIGNIFICANT CHANGE UP (ref 7–23)
CALCIUM SERPL-MCNC: 8.8 MG/DL — SIGNIFICANT CHANGE UP (ref 8.4–10.5)
CHLORIDE SERPL-SCNC: 111 MMOL/L — HIGH (ref 98–107)
CO2 SERPL-SCNC: 19 MMOL/L — LOW (ref 22–31)
CREAT SERPL-MCNC: 1.38 MG/DL — HIGH (ref 0.5–1.3)
GLUCOSE SERPL-MCNC: 99 MG/DL — SIGNIFICANT CHANGE UP (ref 70–99)
HCT VFR BLD CALC: 27.3 % — LOW (ref 39–50)
HGB BLD-MCNC: 8.6 G/DL — LOW (ref 13–17)
MAGNESIUM SERPL-MCNC: 1.7 MG/DL — SIGNIFICANT CHANGE UP (ref 1.6–2.6)
MCHC RBC-ENTMCNC: 27 PG — SIGNIFICANT CHANGE UP (ref 27–34)
MCHC RBC-ENTMCNC: 31.5 % — LOW (ref 32–36)
MCV RBC AUTO: 85.6 FL — SIGNIFICANT CHANGE UP (ref 80–100)
NRBC # FLD: 0 K/UL — SIGNIFICANT CHANGE UP (ref 0–0)
PHOSPHATE SERPL-MCNC: 2.3 MG/DL — LOW (ref 2.5–4.5)
PLATELET # BLD AUTO: 173 K/UL — SIGNIFICANT CHANGE UP (ref 150–400)
PMV BLD: 10.5 FL — SIGNIFICANT CHANGE UP (ref 7–13)
POTASSIUM SERPL-MCNC: 4.8 MMOL/L — SIGNIFICANT CHANGE UP (ref 3.5–5.3)
POTASSIUM SERPL-SCNC: 4.8 MMOL/L — SIGNIFICANT CHANGE UP (ref 3.5–5.3)
RBC # BLD: 3.19 M/UL — LOW (ref 4.2–5.8)
RBC # FLD: 16.8 % — HIGH (ref 10.3–14.5)
SODIUM SERPL-SCNC: 139 MMOL/L — SIGNIFICANT CHANGE UP (ref 135–145)
WBC # BLD: 7.67 K/UL — SIGNIFICANT CHANGE UP (ref 3.8–10.5)
WBC # FLD AUTO: 7.67 K/UL — SIGNIFICANT CHANGE UP (ref 3.8–10.5)

## 2020-05-02 PROCEDURE — 99239 HOSP IP/OBS DSCHRG MGMT >30: CPT

## 2020-05-02 RX ORDER — PROPRANOLOL HCL 160 MG
1 CAPSULE, EXTENDED RELEASE 24HR ORAL
Qty: 0 | Refills: 0 | DISCHARGE

## 2020-05-02 RX ADMIN — PANTOPRAZOLE SODIUM 40 MILLIGRAM(S): 20 TABLET, DELAYED RELEASE ORAL at 05:14

## 2020-05-02 RX ADMIN — Medication 1 GRAM(S): at 12:28

## 2020-05-02 RX ADMIN — Medication 1 GRAM(S): at 05:14

## 2020-05-02 RX ADMIN — LACTULOSE 15 GRAM(S): 10 SOLUTION ORAL at 00:08

## 2020-05-02 RX ADMIN — Medication 1 GRAM(S): at 00:08

## 2020-05-02 RX ADMIN — CEFTRIAXONE 100 MILLIGRAM(S): 500 INJECTION, POWDER, FOR SOLUTION INTRAMUSCULAR; INTRAVENOUS at 05:14

## 2020-05-02 RX ADMIN — LACTULOSE 15 GRAM(S): 10 SOLUTION ORAL at 05:13

## 2020-05-02 NOTE — PROGRESS NOTE ADULT - PROBLEM SELECTOR PLAN 1
- Acute blood loss anemia with melena: Hb 5.3 on admission, s/p 2U PRBCs  - Secondary to likely colon AVMs now s/p APC. EGD negative for active bleeding.  - GI recs appreciated  - Monitor H/H, Stable ~8.5  - Now off octreotide and protonix gtt, now on Protonix PO. Today is day 7 of 7 of antibiotics. - Acute blood loss anemia with melena: Hb 5.3 on admission, s/p 2U PRBCs  - Secondary to likely colon AVMs now s/p APC. EGD negative for active bleeding.  - GI recs appreciated  - Monitor H/H, Stable ~8.5  - Now off octreotide and protonix gtt, now on Protonix PO. Today is day 7 of 7 of Ceftriaxone

## 2020-05-02 NOTE — PROGRESS NOTE ADULT - ASSESSMENT
79M patient w/ PMH of HTN, CKD3, distant GBS, Decompensated Alcoholic Cirrhosis (now sober, off transplant list at [facility] as improved, w/ Small Grade 1 Esophogeal Varices (EGD 9/19) w/ hx Esophageal Bleeding s/p banding 2018. Porcelain Gallbladder, known R Rectus Sheath Mass, Liver lesion and R Renal pole lesions of unclear etiology, SMA stenosis, Pancreatitis s/p ERCP, GI bleeding 2/2 hemorrhoids, colonic AVMs (s/p cauterization) who presented with black tarry stools-colonoscopy showed non bleeding rectal varices, vascular ectasias in ascending and descending colon that were ablated.

## 2020-05-02 NOTE — PROGRESS NOTE ADULT - SUBJECTIVE AND OBJECTIVE BOX
[facility] Division of Hospital Medicine  Rosie Vickers MD  Pager: [phone]      Patient is a 80y old  Male who presents with a chief complaint of GI bleeding, Acute blood loss anemia. (01 May 2020 14:21)      SUBJECTIVE / OVERNIGHT EVENTS:    MEDICATIONS  (STANDING):  cefTRIAXone   IVPB 1000 milliGRAM(s) IV Intermittent every 24 hours  lactulose Syrup 15 Gram(s) Oral every 6 hours  pantoprazole    Tablet 40 milliGRAM(s) Oral two times a day  sucralfate 1 Gram(s) Oral four times a day    MEDICATIONS  (PRN):  acetaminophen   Tablet .. 650 milliGRAM(s) Oral every 6 hours PRN Temp greater or equal to 38C (100.4F), Mild Pain (1 - 3)      CAPILLARY BLOOD GLUCOSE        I&O's Summary    01 May 2020 07:01  -  02 May 2020 07:00  --------------------------------------------------------  IN: 0 mL / OUT: 250 mL / NET: -250 mL        PHYSICAL EXAM:  Vital Signs Last 24 Hrs  T(C): 36.7 (01 May 2020 22:40), Max: 37 (01 May 2020 17:13)  T(F): 98.1 (01 May 2020 22:40), Max: 98.6 (01 May 2020 17:13)  HR: 72 (01 May 2020 22:40) (65 - 72)  BP: 149/76 (01 May 2020 22:40) (130/66 - 149/76)  BP(mean): --  RR: 18 (01 May 2020 22:40) (17 - 18)  SpO2: 100% (01 May 2020 22:40) (100% - 100%)        LABS:                        8.6    7.67  )-----------( 173      ( 02 May 2020 05:20 )             27.3     05-02    139  |  111<H>  |  15  ----------------------------<  99  4.8   |  19<L>  |  1.38<H>    Ca    8.8      02 May 2020 05:20  Phos  2.3     05-02  Mg     1.7     05-02                  RADIOLOGY & ADDITIONAL TESTS:  Results Reviewed:   Imaging Personally Reviewed:  Electrocardiogram Personally Reviewed:    COORDINATION OF CARE:  Care Discussed with Consultants/Other Providers [Y/N]:  Prior or Outpatient Records Reviewed [Y/N]: [facility] Division of Hospital Medicine  Rosie Vickers MD  Pager: [phone]      Patient is a 80y old  Male who presents with a chief complaint of GI bleeding, Acute blood loss anemia. (01 May 2020 14:21)      SUBJECTIVE / OVERNIGHT EVENTS: patient seen and examined. states he ate breakfast without abdominal pain, had BM, no blood. walked with PT, wants rollator to go home.     MEDICATIONS  (STANDING):  cefTRIAXone   IVPB 1000 milliGRAM(s) IV Intermittent every 24 hours  lactulose Syrup 15 Gram(s) Oral every 6 hours  pantoprazole    Tablet 40 milliGRAM(s) Oral two times a day  sucralfate 1 Gram(s) Oral four times a day    MEDICATIONS  (PRN):  acetaminophen   Tablet .. 650 milliGRAM(s) Oral every 6 hours PRN Temp greater or equal to 38C (100.4F), Mild Pain (1 - 3)      I&O's Summary    01 May 2020 07:01  -  02 May 2020 07:00  --------------------------------------------------------  IN: 0 mL / OUT: 250 mL / NET: -250 mL        PHYSICAL EXAM:  Vital Signs Last 24 Hrs  T(C): 36.7 (01 May 2020 22:40), Max: 37 (01 May 2020 17:13)  T(F): 98.1 (01 May 2020 22:40), Max: 98.6 (01 May 2020 17:13)  HR: 72 (01 May 2020 22:40) (65 - 72)  BP: 149/76 (01 May 2020 22:40) (130/66 - 149/76)  BP(mean): --  RR: 18 (01 May 2020 22:40) (17 - 18)  SpO2: 100% (01 May 2020 22:40) (100% - 100%)    CONSTITUTIONAL: NAD, well-developed, well-groomed  ENMT: Moist oral mucosa  RESPIRATORY: Normal respiratory effort; lungs are clear to auscultation bilaterally  CARDIOVASCULAR: Regular rate and rhythm; No lower extremity edema  ABDOMEN: Nontender to palpation, normoactive bowel sounds, no rebound/guarding  PSYCH: A+O to person, place, and time; affect appropriate  NEUROLOGY: no focal deficits  SKIN: No rashes; no palpable lesions    LABS:                        8.6    7.67  )-----------( 173      ( 02 May 2020 05:20 )             27.3     05-02    139  |  111<H>  |  15  ----------------------------<  99  4.8   |  19<L>  |  1.38<H>    Ca    8.8      02 May 2020 05:20  Phos  2.3     05-02  Mg     1.7     05-02

## 2020-05-02 NOTE — PROGRESS NOTE ADULT - PROBLEM SELECTOR PLAN 2
- Improved. Likely due to volume depletion for GIB  - Monitor Cr daily, f/u urine lytes, monitor electrolytes.   - Avoid nephrotoxic agents, Renally dose meds.

## 2020-05-02 NOTE — PROGRESS NOTE ADULT - REASON FOR ADMISSION
GI bleeding, Acute blood loss anemia.

## 2020-05-02 NOTE — PROGRESS NOTE ADULT - PROBLEM SELECTOR PLAN 5
- DVT ppx: no chemic ppx due to active GI bleeding, SCDs for now.   - PT eval: home w/ home PT   - Dispo: likely home tomorrow. Patient and wife declining home services - DVT ppx: no chemic ppx due to active GI bleeding, SCDs for now.   - PT eval: home w/ home PT   - Dispo: home today. Patient and wife declining home services

## 2020-05-02 NOTE — PROGRESS NOTE ADULT - PROBLEM SELECTOR PLAN 4
+orthostatics. s/p IVF with some improvement.   - Will continue to hold propranolol. Will likely need to be restarted as an outpatient.   - Recheck orthostatics today   - PT virgen +orthostatics. s/p IVF with improvement.   - Will continue to hold propranolol. Will likely need to be restarted as an outpatient.   - Recheck orthostatics today -  laying and 87 standing. Discussed with patient and wife and advised PO intake and slow transitions from laying > sitting > standing

## 2020-05-02 NOTE — DISCHARGE NOTE NURSING/CASE MANAGEMENT/SOCIAL WORK - PATIENT PORTAL LINK FT
You can access the FollowMyHealth Patient Portal offered by [facility] by registering at the following website: [email or web address]. By joining [employer]’s FollowMyHealth portal, you will also be able to view your health information using other applications (apps) compatible with our system.

## 2020-05-02 NOTE — PROGRESS NOTE ADULT - ATTENDING COMMENTS
Patient seen and examined with the GI fellow. I agree with the above assessment and plan. Thank you for allowing us to care for your patient.
I agree with plan as above
Spoke with patient's wife Shannon Florez ([phone]) and updated her per patient's request. All questions answered.
Spoke with patient's wife Shannon Florez ([phone]) and updated her per patient's request. All questions
Patient stable for discharge home today. Patient and wife declined home PT services. CM assistance for rollator. Hold propranolol on discharge give dizziness/orthostatic hypotension. H/H stable and completed 7 days of antibiotics total. Spoke to patient's wife regarding discharge, all questions answered, patient's son-in-law will pick him up later today.     38 minutes spent on discharge planning.
Spoke with patient's wife Shannon Florez ([phone]) and updated her. She is okay with taking care of patient at home (helps with his medications) but she does not want home PT to come to the house because of covid. She is okay with discharge tomorrow.

## 2020-05-02 NOTE — DISCHARGE NOTE NURSING/CASE MANAGEMENT/SOCIAL WORK - NSDCPNINST_GEN_ALL_CORE
Make a follow up appointment with your physician. Call if you experience increased dizziness, or bleeding.

## 2020-05-02 NOTE — PROGRESS NOTE ADULT - PROBLEM SELECTOR PLAN 3
- Currently sober, off transplant list at [facility] as improved. MELD score 19-20.   - Small Grade 1 Esophogeal Varices (EGD 9/19) s/p banding 2018   - Negative HCC work up as of 4/19, no ascites seen on CT scan 4/2/2020.   - Hepatology GI Dr. Richie Mcgraw  - Propranolol to be restarted but holding off d/t +orthostatics

## 2020-05-14 ENCOUNTER — APPOINTMENT (OUTPATIENT)
Dept: GASTROENTEROLOGY | Facility: HOSPITAL | Age: 80
End: 2020-05-14

## 2020-05-18 RX ORDER — PROPRANOLOL HYDROCHLORIDE 10 MG/1
10 TABLET ORAL TWICE DAILY
Qty: 180 | Refills: 0 | Status: DISCONTINUED | COMMUNITY
End: 2020-05-18

## 2020-05-19 ENCOUNTER — APPOINTMENT (OUTPATIENT)
Dept: HEPATOLOGY | Facility: CLINIC | Age: 80
End: 2020-05-19
Payer: MEDICARE

## 2020-05-19 DIAGNOSIS — D50.0 IRON DEFICIENCY ANEMIA SECONDARY TO BLOOD LOSS (CHRONIC): ICD-10-CM

## 2020-05-19 DIAGNOSIS — Z92.89 PERSONAL HISTORY OF OTHER MEDICAL TREATMENT: ICD-10-CM

## 2020-05-19 DIAGNOSIS — K92.2 GASTROINTESTINAL HEMORRHAGE, UNSPECIFIED: ICD-10-CM

## 2020-05-19 DIAGNOSIS — A04.8 OTHER SPECIFIED BACTERIAL INTESTINAL INFECTIONS: ICD-10-CM

## 2020-05-19 DIAGNOSIS — M79.9 SOFT TISSUE DISORDER, UNSPECIFIED: ICD-10-CM

## 2020-05-19 DIAGNOSIS — N18.3 CHRONIC KIDNEY DISEASE, STAGE 3 (MODERATE): ICD-10-CM

## 2020-05-19 DIAGNOSIS — E66.9 OBESITY, UNSPECIFIED: ICD-10-CM

## 2020-05-19 DIAGNOSIS — I10 ESSENTIAL (PRIMARY) HYPERTENSION: ICD-10-CM

## 2020-05-19 DIAGNOSIS — K80.20 CALCULUS OF GALLBLADDER W/OUT CHOLECYSTITIS W/OUT OBSTRUCTION: ICD-10-CM

## 2020-05-19 DIAGNOSIS — I85.01 ESOPHAGEAL VARICES WITH BLEEDING: ICD-10-CM

## 2020-05-19 DIAGNOSIS — Z87.898 PERSONAL HISTORY OF OTHER SPECIFIED CONDITIONS: ICD-10-CM

## 2020-05-19 DIAGNOSIS — K82.8 OTHER SPECIFIED DISEASES OF GALLBLADDER: ICD-10-CM

## 2020-05-19 DIAGNOSIS — R60.0 LOCALIZED EDEMA: ICD-10-CM

## 2020-05-19 DIAGNOSIS — K26.9 DUODENAL ULCER, UNSPECIFIED AS ACUTE OR CHRONIC, W/OUT HEMORRHAGE OR PERFORATION: ICD-10-CM

## 2020-05-19 DIAGNOSIS — M79.641 PAIN IN RIGHT HAND: ICD-10-CM

## 2020-05-19 DIAGNOSIS — Z96.89 PRESENCE OF OTHER SPECIFIED FUNCTIONAL IMPLANTS: ICD-10-CM

## 2020-05-19 PROCEDURE — 99215 OFFICE O/P EST HI 40 MIN: CPT | Mod: 95

## 2020-05-19 RX ORDER — FUROSEMIDE 20 MG/1
20 TABLET ORAL
Qty: 30 | Refills: 2 | Status: ACTIVE | COMMUNITY
Start: 2020-05-19 | End: 1900-01-01

## 2020-05-19 RX ORDER — PROPRANOLOL HYDROCHLORIDE 10 MG/1
10 TABLET ORAL TWICE DAILY
Qty: 180 | Refills: 0 | Status: ACTIVE | COMMUNITY
Start: 2020-05-19 | End: 1900-01-01

## 2020-05-19 RX ORDER — SUCRALFATE 1 G/1
1 TABLET ORAL 4 TIMES DAILY
Refills: 0 | Status: DISCONTINUED | COMMUNITY
End: 2020-05-19

## 2020-05-19 RX ORDER — CHROMIUM 200 MCG
TABLET ORAL
Refills: 0 | Status: ACTIVE | COMMUNITY

## 2020-05-19 RX ORDER — MULTIVITAMIN
TABLET ORAL
Refills: 0 | Status: ACTIVE | COMMUNITY

## 2020-05-19 RX ORDER — PANTOPRAZOLE 40 MG/1
40 TABLET, DELAYED RELEASE ORAL
Refills: 0 | Status: DISCONTINUED | COMMUNITY
End: 2020-05-19

## 2020-05-19 NOTE — PHYSICAL EXAM
[Bowel Sounds] : normal bowel sounds [Edema] : there was no peripheral edema [Abdomen Tenderness] : non-tender [Abdomen Soft] : soft [] : no hepato-splenomegaly [Abdomen Mass (___ Cm)] : no abdominal mass palpated [FreeTextEntry1] : no asterixis

## 2020-05-19 NOTE — CONSULT LETTER
[Dear  ___] : Dear  [unfilled], [Courtesy Letter:] : I had the pleasure of seeing your patient, [unfilled], in my office today. [Referral Closing:] : Thank you very much for seeing this patient.  If you have any questions, please do not hesitate to contact me. [Please see my note below.] : Please see my note below. [Sincerely,] : Sincerely, [FreeTextEntry2] : Brian Pizarro (PCP) [FreeTextEntry3] : Richie Mcgraw MD\par , [facility]\par General Hepatology and Gastroenterology\par [facility]\par [facility]\par [address] [place] [address]\[place] [address]\par [phone]\par

## 2020-05-19 NOTE — HISTORY OF PRESENT ILLNESS
[Alcohol Abuse] : alcohol abuse [Malaise] : denies malaise [Diffuse Joint Pain (Arthralgias)] : arthralgias stable [Fever] : denies fever [Yellow Skin Or Eyes (Jaundice)] : denies jaundice [Muscle Aches, Generalized (Myalgias)] : denies myalgias [Abdominal Pain] : denies abdominal pain [Urine Tests Nonspecific Abnormal Findings Biliuria] : denies dark urine [Light Colored Bowel Movement (Acholic Stools)] : denies pale stools [Insomnia] : insomnia stable [Skin: Rash] : denies rash [Itching (Pruritus)] : denies pruritus [Shortness Of Breath] : denies shortness of breath [Needlestick Exposure] : no needlestick exposure [Infected Sexual Partner] : no infected sexual partner [IV Drug Use] : no IV drug use [Tattoo] : no tattoos [Body Piercing] : no body piercing [Hemodialysis] : no hemodialysis [Transfusion before 1992] : no transfusion before 1992 [Transplant before 1992] : no transplant before 1992 [Incarceration] : no incarceration [Autoimmune Disorder] : no autoimmune disorder [Household Contact to HBV] : no household contact to HBV [Travel to Endemic Area] : no travel to an endemic area [Occupational Exposure] : no occupational exposure [Cocaine Use] : no cocaine use [Wt Gain ___ Lbs] : no recent weight gain [Wt Loss ___ Lbs] : no recent weight loss [de-identified] : Pt. consented to telehealth visit.\par \par - 5/19/20: returns after 3 months and two recent hospitalizations, last one for duodenal ulcer bleed one month after stopping ppi - denies NSAID intake, one for melena and symptomatic anemia Hb 5.6 presumed due to colonic AVMs - cauterized, transfused, Hb 8.6 upon discharge, now doing welll.  Reports brown stools and BP of 140s mmHg. Only complaint is chronic pain in right hand, taking acetaminophen for it. Was discharged off lasix and propranolol. Pantoprazole was resumed, sucralfate added. Last labs on 5/02: creat 1.38 after KAY max. >2, INR 1.38, bilirubin < 1.\par \par Mr. 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 He was discharged with Propranolol 20 mg bid, lactulose 10 mg/d for constipation - confused years ago.\par Hospitalization 10/2019 with severe RUQ pain, found SIRS and cholelithiasis treated with ERCP and stone extraction, course c/b hematochezia attributed to medium-sized hemorrhoids, also had colonic AVMs that were cauterized.\par - 3/26/19: doing well, rib pain has resolved. Is taking laculose 10 g 1 to 3 times daily for constipation, also propranolol 10 bid, pantoprazole 40 for GERD. Lost 4 lbs, BMI still 31.95. US was non-diagnostic. EGD showed no varices.\par - 2/12/19: reports resolved dyspepsia (pain on empty stomach), takes lactulose three times per day for constipation, has 1 BM/d. No edema, not on diuretics.\par - 1/7/18: here one month early for left-sided abdominal pain that started 1 week ago. He as apparently visibly bloated with abdominal distension yesterday - him and wife report that is is not there anymore today. He stopped PPI last visit. Pain occurs on empty stomach. He also feels gas, improved when he did take some pantoprazole that he still had, after 30 minutes or after drinking milk. Takes lactulose BID instead of TID, is unsure of BM - had some small BM 1 or 2 days ago. \par \par - 11/5/18: doing well, last alcohol 8/2018. Takes Advil for hip pain.\par \par Workup:\par - 4/29/20 colon (DR. Mckeon, [facility]): 2 AVMs in ascending and descending colon AVM, cauterized. Rectal varices.\par - 4/28/20 EGD (Dr. Mckeon): trace esophageal varices, nodular gastric incisura - biopsies: H. p. negative, pancreatic duct stent removed\par - 11/5/19 ERCP (Mike): CBD stone removed, papillotomy, plastic stent placed. F/u with AXR to see if stent still present.\par - 11/01/19 ([place], me): moderate medium sized hemorrhoids - likely source of hematochezia. 5mm polyp, AVMs cauterized: 1 small, 2 medium-sized. Good prep.\par - 10/28/19 CT abdomen pelvis: Cholelithiasis and porcelain gallbladder are again noted. Choledocholithiasis, with mild dilatation of the common bile duct. Cirrhotic configuration of the liver.\par Minimal infiltration of the fat adjacent to the pancreatic head and proximal duodenum, raising consideration of mild pancreatitis and/or duodenitis. Complex mass right rectus abdominis 3 cm unchanged from 5/2019.\par - 9/11/19 EGD: small esophageal varices, too small to band. Normal stomach, biopsied. Semi-circular ring of granular, erythematous mucosa 1st portion of duodenum, biopsied. Path: active erosive peptic duodenitis, chronic inactive gastritis, rare H. pylori on Warthin-Starry stain.\par - 9/11/19 colonoscopy: 5mm polyp, 2 medium-sized AVMs and several small ones ascending colon, 2 small AVMs in rectum, all APC'd. Small rectal varices, small internal hemorrhoids. Excellent prep. Path: tubular adenoma.\par - 6/24/19 FOBT: negative\par - 5/22/19 CT abdomen: advanced cirrhosis, no focal lesion, distal common duct calculus also visualized in retrospect on MRI 9/13/19, partial porcelain ggallbladder and gallstones. Spleen normal, \par - 3/5/19 US abdomen: poor visualization. Cirrhosis. Grossly, no obvious liver lesion. No ascites. Normal spleen; stenosis and calcification origin SMA. 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 Scar in lower esophagus from prior banding. Mild GAVE. Arytenoid edema. Repeat in 1 year. \par - MRI 9/13/18 ([facility]): liver lesions not identified. Cholelithiasis, renal mass resolved - was possibly inflammatory.  3.9 cm abdominal wall lesion (right anteriorly), no signif change from 5/2018. May be hematoma.\par - EGD 9/12/18: esophagus: no varices. Scars from prior banding. stomach: portal HTN gastropathy, erythema. Biopsied. Duodenum: normal. \par - CT 5/25/18: cholelithiasis and porcelain GB, renal lesion right lower pole\par - MRI abdomen 6/7/18: LI-RADS 3 lesions right hepatic dome and lat. segment L lobe. Rec 4 mo f/u. Gallbladder: gallstones. 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 Renal abnormality resolved - may have been inflammation or infection.\par \par - EGD 5/27/218: large varices, completely eradicated, banded. clotted blood in stomach.\par Last colonoscopy: ~2014, result "ok"\par \par Last alcohol: 5/27/18 when hospitalized.

## 2020-05-19 NOTE — ASSESSMENT
[FreeTextEntry1] : Mr. 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 No prior NSAIDs.\par Recent Hospitalization 4/26-5/02 with melena and Hb 5.3, EGD showed gastritis, colonoscopy an AVM cauterized and rectal varices.\par \par - cholelithiasis: biliary stent removed 4/28/20 while in hospital\par \par - H. pylori with inactive chronic gastritis, but peptic duodenitis, s/p treatment in October 2019. BIopsies 4/2020 neagtive while on PPI\par \par - Alcoholic/WALL cirrhosis, obesity BMI 32;  MELD 17 on 2/28/20\par - variceal bleed 8/2018, banded. Last EGD on 4/28/20: trace varices. Is off propranolol 10 mg bid since hospitalization, BP now >140\par - encephalopathy: none - had some years ago, since then on lactulose once or twice daily for constipation\par - ascites: none; leg edema trace on lasix 40 mg/d - may have CHF\par - HCC: negative CT 4/2020\par - mass in right rectus sheet, 4.9 cm on CT 4/01/20 unchanged from 10/2019\par - CKD 3, creatinine increased from 1.6 in October to 2.0, possibly acute kidney injury vs. 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 As of 6/22/19, his postoperative mortality is estimated to be 37% within 90 days of surgery (age 79, ASA IV, bilirubin <1, creat 1.67, INR 1.41)\par - rectus sheath mass without growth between 9/2018, 5/2019 and 4/2020\par - too old for liver transplant\par \par Records of recent hospitalizations reviewed.\par \par Plan:\par - recent GI bleeds: repeat labs including CBC today and before next visit in 6 weeks\par   - stop sucdralfate, continue pantoprazole, continue tylenol for R hand pain, continue to avoid NSAIDs\par - duodenal ulcers 4/2/20: avoid NSAIDs, stop sucralfate, can stop pantoprazole, repeat H. pylori test in 5 weeks\par - h/o variceal bleed, small varices, HTN: resume propranolol 10 mg bid, repeat EGD in 4/2021 after 1 year\par - HCC screening: next in 10/2020, will do CT/MRI to re-evaluate rectal sheeth mass\par - 1+ leg edema: resume lasix 20 mg/d\par - right hand pain: continue tylenol, take up to 2 g/d\par - constipation: continue lactulose TID. \par \par - gallstones, porcelain gallbladder: surgery too risky\par \par \par \par

## 2020-05-22 LAB
ALBUMIN SERPL ELPH-MCNC: 3.8 G/DL
ALP BLD-CCNC: 106 U/L
ALT SERPL-CCNC: 13 U/L
ANION GAP SERPL CALC-SCNC: 13 MMOL/L
AST SERPL-CCNC: 29 U/L
BASOPHILS # BLD AUTO: 0.1 K/UL
BASOPHILS NFR BLD AUTO: 1.8 %
BILIRUB SERPL-MCNC: 0.4 MG/DL
BUN SERPL-MCNC: 22 MG/DL
CALCIUM SERPL-MCNC: 9.6 MG/DL
CHLORIDE SERPL-SCNC: 108 MMOL/L
CO2 SERPL-SCNC: 22 MMOL/L
CREAT SERPL-MCNC: 1.96 MG/DL
EOSINOPHIL # BLD AUTO: 0.6 K/UL
EOSINOPHIL NFR BLD AUTO: 10.8 %
GLUCOSE SERPL-MCNC: 114 MG/DL
HCT VFR BLD CALC: 37.3 %
HGB BLD-MCNC: 10.7 G/DL
IMM GRANULOCYTES NFR BLD AUTO: 0.4 %
INR PPP: 1.41 RATIO
LYMPHOCYTES # BLD AUTO: 1.15 K/UL
LYMPHOCYTES NFR BLD AUTO: 20.7 %
MAN DIFF?: NORMAL
MCHC RBC-ENTMCNC: 26.2 PG
MCHC RBC-ENTMCNC: 28.7 GM/DL
MCV RBC AUTO: 91.4 FL
MONOCYTES # BLD AUTO: 0.53 K/UL
MONOCYTES NFR BLD AUTO: 9.5 %
NEUTROPHILS # BLD AUTO: 3.15 K/UL
NEUTROPHILS NFR BLD AUTO: 56.8 %
PLATELET # BLD AUTO: 205 K/UL
POTASSIUM SERPL-SCNC: 5.3 MMOL/L
PROT SERPL-MCNC: 6.9 G/DL
PT BLD: 16.3 SEC
RBC # BLD: 4.08 M/UL
RBC # FLD: 19 %
SODIUM SERPL-SCNC: 144 MMOL/L
WBC # FLD AUTO: 5.55 K/UL

## 2020-06-01 NOTE — PROGRESS NOTE ADULT - PROBLEM/PLAN-4
Situation: patient call     Key Assessments:  1. Rash: Bilateral hands are peeling and cracking. She does occasionally pick the skin off which makes it worse. She has been using the cream and then hand lotion at night but doesn't think it really has helped. She is hesitant to see PCP for this rash.  2. INR: has not performed this test, she states she received a call from clinic requesting she have this done. She is aware to go to [facility] for testing.  3.Screenings: she received call from insurance that they would perform in home bone density test. She does not wish to have this done at home. She has the number for [facility] scheduling to have this done at [facility].   4. Community: she states she would like to start driving this week, she has things she wants to do like get a pedicure and get her bone density screen. She wants to have PCP be solely responsible for prescribing her pain medication regimen as she does not want to see Pain Mgmt any longer. She has to pay copay at 2 different times to them and this bothers her.    Actions Taken:  1. Rash: reviewed medication usage, encouraged her to make an appt with PCP as she may need referral to dermatology or different medication. She is hesitant to make an appt. Discussed safety of attending clinic in person at this time and how in-clinic visit process works.  2. INR: encouraged to have this lab completed very soon, she is aware of the risks of not checking levels and does not feel it is concerning \"I feel ok\". She states if she can get the Bone Density test done this week then she would do lab at same time.   3.Screenings: encouraged to call for an appt as they are doing these visits now. She has the #, although [facility] offered. If she is unable to get an appt, she can call me back and I will assist with scheduling.  4. Community: we discussed importance of having relationship with Pain Mgmt clinic and that PCP will not assume responsibility for the  prescriptions at this time. We discussed when she is due for next appt and she did not have an appt in mind but now with knowing that PCP will not prescribe she is aware that she will need to abide by that clinic's regulations.     Plan:  [facility] will FU in 4 weeks. She will call me if she gets INR done this week or has issues with scheduling BD test.  Offered appt with PCP But she declines.      See hyperlinks within encounter for full documentation   DISPLAY PLAN FREE TEXT

## 2020-08-10 NOTE — ED PROVIDER NOTE - NS_BEDUNITTYPES_ED_ALL_ED
RN placed L spine MRI order as requested.  RN unable to cancel MRI w/ sedation for already scheduled for later today.    RN called patient and let her know of new order, that she should call [phone] to set this up- that not likely to be able to keep today's time.    RN enc her to do this before her 8/26/20 f/u appt w/ MD.     During conversation patient asked why the covid 19 testing and RN explained required for all w/ sedation procedures/tetst.  Patient said she declines this.  She asked how long will be in the machine- RN told her about 45 minutes to which she said ok.    MEDICINE

## 2020-08-18 ENCOUNTER — TRANSCRIPTION ENCOUNTER (OUTPATIENT)
Age: 80
End: 2020-08-18

## 2020-08-19 ENCOUNTER — INPATIENT (INPATIENT)
Facility: HOSPITAL | Age: 80
LOS: 4 days | Discharge: NURSING, REHAB OR LONG-TERM CARE | End: 2020-08-24
Attending: ORTHOPAEDIC SURGERY | Admitting: ORTHOPAEDIC SURGERY
Payer: MEDICARE

## 2020-08-19 VITALS
DIASTOLIC BLOOD PRESSURE: 77 MMHG | SYSTOLIC BLOOD PRESSURE: 168 MMHG | HEART RATE: 53 BPM | RESPIRATION RATE: 18 BRPM | OXYGEN SATURATION: 99 % | TEMPERATURE: 98 F

## 2020-08-19 DIAGNOSIS — Z98.890 OTHER SPECIFIED POSTPROCEDURAL STATES: Chronic | ICD-10-CM

## 2020-08-19 DIAGNOSIS — S72.009A FRACTURE OF UNSPECIFIED PART OF NECK OF UNSPECIFIED FEMUR, INITIAL ENCOUNTER FOR CLOSED FRACTURE: ICD-10-CM

## 2020-08-19 LAB
ALBUMIN SERPL ELPH-MCNC: 3.9 G/DL — SIGNIFICANT CHANGE UP (ref 3.3–5)
ALP SERPL-CCNC: 132 U/L — HIGH (ref 40–120)
ALT FLD-CCNC: 17 U/L — SIGNIFICANT CHANGE UP (ref 4–41)
ANION GAP SERPL CALC-SCNC: 12 MMO/L — SIGNIFICANT CHANGE UP (ref 7–14)
ANION GAP SERPL CALC-SCNC: 15 MMO/L — HIGH (ref 7–14)
APTT BLD: 36.5 SEC — HIGH (ref 27–36.3)
AST SERPL-CCNC: 45 U/L — HIGH (ref 4–40)
BASOPHILS # BLD AUTO: 0.08 K/UL — SIGNIFICANT CHANGE UP (ref 0–0.2)
BASOPHILS NFR BLD AUTO: 0.9 % — SIGNIFICANT CHANGE UP (ref 0–2)
BILIRUB SERPL-MCNC: 0.7 MG/DL — SIGNIFICANT CHANGE UP (ref 0.2–1.2)
BLD GP AB SCN SERPL QL: NEGATIVE — SIGNIFICANT CHANGE UP
BUN SERPL-MCNC: 36 MG/DL — HIGH (ref 7–23)
BUN SERPL-MCNC: 40 MG/DL — HIGH (ref 7–23)
CALCIUM SERPL-MCNC: 10 MG/DL — SIGNIFICANT CHANGE UP (ref 8.4–10.5)
CALCIUM SERPL-MCNC: 8.7 MG/DL — SIGNIFICANT CHANGE UP (ref 8.4–10.5)
CHLORIDE SERPL-SCNC: 104 MMOL/L — SIGNIFICANT CHANGE UP (ref 98–107)
CHLORIDE SERPL-SCNC: 110 MMOL/L — HIGH (ref 98–107)
CK MB BLD-MCNC: 3.21 NG/ML — SIGNIFICANT CHANGE UP (ref 1–6.6)
CK MB BLD-MCNC: SIGNIFICANT CHANGE UP (ref 0–2.5)
CK SERPL-CCNC: 103 U/L — SIGNIFICANT CHANGE UP (ref 30–200)
CO2 SERPL-SCNC: 18 MMOL/L — LOW (ref 22–31)
CO2 SERPL-SCNC: 19 MMOL/L — LOW (ref 22–31)
CREAT SERPL-MCNC: 1.57 MG/DL — HIGH (ref 0.5–1.3)
CREAT SERPL-MCNC: 1.66 MG/DL — HIGH (ref 0.5–1.3)
EOSINOPHIL # BLD AUTO: 0.55 K/UL — HIGH (ref 0–0.5)
EOSINOPHIL NFR BLD AUTO: 6 % — SIGNIFICANT CHANGE UP (ref 0–6)
GLUCOSE SERPL-MCNC: 111 MG/DL — HIGH (ref 70–99)
GLUCOSE SERPL-MCNC: 99 MG/DL — SIGNIFICANT CHANGE UP (ref 70–99)
HCT VFR BLD CALC: 39.5 % — SIGNIFICANT CHANGE UP (ref 39–50)
HCT VFR BLD CALC: 44.3 % — SIGNIFICANT CHANGE UP (ref 39–50)
HGB BLD-MCNC: 12.5 G/DL — LOW (ref 13–17)
HGB BLD-MCNC: 14.1 G/DL — SIGNIFICANT CHANGE UP (ref 13–17)
IMM GRANULOCYTES NFR BLD AUTO: 0.5 % — SIGNIFICANT CHANGE UP (ref 0–1.5)
INR BLD: 1.52 — HIGH (ref 0.88–1.16)
LYMPHOCYTES # BLD AUTO: 1.16 K/UL — SIGNIFICANT CHANGE UP (ref 1–3.3)
LYMPHOCYTES # BLD AUTO: 12.6 % — LOW (ref 13–44)
MAGNESIUM SERPL-MCNC: 1.7 MG/DL — SIGNIFICANT CHANGE UP (ref 1.6–2.6)
MCHC RBC-ENTMCNC: 26 PG — LOW (ref 27–34)
MCHC RBC-ENTMCNC: 26.2 PG — LOW (ref 27–34)
MCHC RBC-ENTMCNC: 31.6 % — LOW (ref 32–36)
MCHC RBC-ENTMCNC: 31.8 % — LOW (ref 32–36)
MCV RBC AUTO: 82.1 FL — SIGNIFICANT CHANGE UP (ref 80–100)
MCV RBC AUTO: 82.2 FL — SIGNIFICANT CHANGE UP (ref 80–100)
MONOCYTES # BLD AUTO: 0.46 K/UL — SIGNIFICANT CHANGE UP (ref 0–0.9)
MONOCYTES NFR BLD AUTO: 5 % — SIGNIFICANT CHANGE UP (ref 2–14)
NEUTROPHILS # BLD AUTO: 6.92 K/UL — SIGNIFICANT CHANGE UP (ref 1.8–7.4)
NEUTROPHILS NFR BLD AUTO: 75 % — SIGNIFICANT CHANGE UP (ref 43–77)
NRBC # FLD: 0 K/UL — SIGNIFICANT CHANGE UP (ref 0–0)
NRBC # FLD: 0 K/UL — SIGNIFICANT CHANGE UP (ref 0–0)
NT-PROBNP SERPL-SCNC: 1303 PG/ML — SIGNIFICANT CHANGE UP
PHOSPHATE SERPL-MCNC: 2.7 MG/DL — SIGNIFICANT CHANGE UP (ref 2.5–4.5)
PLATELET # BLD AUTO: 142 K/UL — LOW (ref 150–400)
PLATELET # BLD AUTO: 144 K/UL — LOW (ref 150–400)
PMV BLD: 10.9 FL — SIGNIFICANT CHANGE UP (ref 7–13)
PMV BLD: 11.6 FL — SIGNIFICANT CHANGE UP (ref 7–13)
POTASSIUM SERPL-MCNC: 4.4 MMOL/L — SIGNIFICANT CHANGE UP (ref 3.5–5.3)
POTASSIUM SERPL-MCNC: 4.7 MMOL/L — SIGNIFICANT CHANGE UP (ref 3.5–5.3)
POTASSIUM SERPL-MCNC: 6 MMOL/L — HIGH (ref 3.5–5.3)
POTASSIUM SERPL-SCNC: 4.4 MMOL/L — SIGNIFICANT CHANGE UP (ref 3.5–5.3)
POTASSIUM SERPL-SCNC: 4.7 MMOL/L — SIGNIFICANT CHANGE UP (ref 3.5–5.3)
POTASSIUM SERPL-SCNC: 6 MMOL/L — HIGH (ref 3.5–5.3)
PROT SERPL-MCNC: 8.3 G/DL — SIGNIFICANT CHANGE UP (ref 6–8.3)
PROTHROM AB SERPL-ACNC: 17 SEC — HIGH (ref 10.6–13.6)
RBC # BLD: 4.81 M/UL — SIGNIFICANT CHANGE UP (ref 4.2–5.8)
RBC # BLD: 5.39 M/UL — SIGNIFICANT CHANGE UP (ref 4.2–5.8)
RBC # FLD: 16.8 % — HIGH (ref 10.3–14.5)
RBC # FLD: 18 % — HIGH (ref 10.3–14.5)
RH IG SCN BLD-IMP: POSITIVE — SIGNIFICANT CHANGE UP
SARS-COV-2 RNA SPEC QL NAA+PROBE: SIGNIFICANT CHANGE UP
SODIUM SERPL-SCNC: 138 MMOL/L — SIGNIFICANT CHANGE UP (ref 135–145)
SODIUM SERPL-SCNC: 140 MMOL/L — SIGNIFICANT CHANGE UP (ref 135–145)
TROPONIN T, HIGH SENSITIVITY: 32 NG/L — SIGNIFICANT CHANGE UP (ref ?–14)
TSH SERPL-MCNC: 4.2 UIU/ML — SIGNIFICANT CHANGE UP (ref 0.27–4.2)
WBC # BLD: 8.39 K/UL — SIGNIFICANT CHANGE UP (ref 3.8–10.5)
WBC # BLD: 9.22 K/UL — SIGNIFICANT CHANGE UP (ref 3.8–10.5)
WBC # FLD AUTO: 8.39 K/UL — SIGNIFICANT CHANGE UP (ref 3.8–10.5)
WBC # FLD AUTO: 9.22 K/UL — SIGNIFICANT CHANGE UP (ref 3.8–10.5)

## 2020-08-19 PROCEDURE — 71045 X-RAY EXAM CHEST 1 VIEW: CPT | Mod: 26

## 2020-08-19 PROCEDURE — 73552 X-RAY EXAM OF FEMUR 2/>: CPT | Mod: 26,RT

## 2020-08-19 PROCEDURE — 93010 ELECTROCARDIOGRAM REPORT: CPT

## 2020-08-19 PROCEDURE — 27245 TREAT THIGH FRACTURE: CPT | Mod: RT

## 2020-08-19 PROCEDURE — 99223 1ST HOSP IP/OBS HIGH 75: CPT

## 2020-08-19 PROCEDURE — 99284 EMERGENCY DEPT VISIT MOD MDM: CPT

## 2020-08-19 PROCEDURE — 73502 X-RAY EXAM HIP UNI 2-3 VIEWS: CPT | Mod: 26,RT,76

## 2020-08-19 RX ORDER — LACTULOSE 10 G/15ML
15 SOLUTION ORAL
Refills: 0 | Status: DISCONTINUED | OUTPATIENT
Start: 2020-08-19 | End: 2020-08-24

## 2020-08-19 RX ORDER — ACETAMINOPHEN 500 MG
650 TABLET ORAL ONCE
Refills: 0 | Status: COMPLETED | OUTPATIENT
Start: 2020-08-19 | End: 2020-08-19

## 2020-08-19 RX ORDER — ENOXAPARIN SODIUM 100 MG/ML
40 INJECTION SUBCUTANEOUS DAILY
Refills: 0 | Status: DISCONTINUED | OUTPATIENT
Start: 2020-08-20 | End: 2020-08-24

## 2020-08-19 RX ORDER — FAMOTIDINE 10 MG/ML
20 INJECTION INTRAVENOUS DAILY
Refills: 0 | Status: DISCONTINUED | OUTPATIENT
Start: 2020-08-19 | End: 2020-08-21

## 2020-08-19 RX ORDER — HYDROMORPHONE HYDROCHLORIDE 2 MG/ML
1 INJECTION INTRAMUSCULAR; INTRAVENOUS; SUBCUTANEOUS
Refills: 0 | Status: DISCONTINUED | OUTPATIENT
Start: 2020-08-19 | End: 2020-08-20

## 2020-08-19 RX ORDER — HYDROMORPHONE HYDROCHLORIDE 2 MG/ML
0.5 INJECTION INTRAMUSCULAR; INTRAVENOUS; SUBCUTANEOUS
Refills: 0 | Status: DISCONTINUED | OUTPATIENT
Start: 2020-08-19 | End: 2020-08-20

## 2020-08-19 RX ORDER — ACETAMINOPHEN 500 MG
650 TABLET ORAL EVERY 6 HOURS
Refills: 0 | Status: DISCONTINUED | OUTPATIENT
Start: 2020-08-19 | End: 2020-08-20

## 2020-08-19 RX ORDER — OXYCODONE HYDROCHLORIDE 5 MG/1
5 TABLET ORAL EVERY 4 HOURS
Refills: 0 | Status: DISCONTINUED | OUTPATIENT
Start: 2020-08-19 | End: 2020-08-20

## 2020-08-19 RX ORDER — MORPHINE SULFATE 50 MG/1
2 CAPSULE, EXTENDED RELEASE ORAL ONCE
Refills: 0 | Status: DISCONTINUED | OUTPATIENT
Start: 2020-08-19 | End: 2020-08-19

## 2020-08-19 RX ORDER — POVIDONE-IODINE 5 %
1 AEROSOL (ML) TOPICAL ONCE
Refills: 0 | Status: DISCONTINUED | OUTPATIENT
Start: 2020-08-19 | End: 2020-08-24

## 2020-08-19 RX ORDER — SODIUM CHLORIDE 9 MG/ML
1000 INJECTION INTRAMUSCULAR; INTRAVENOUS; SUBCUTANEOUS
Refills: 0 | Status: DISCONTINUED | OUTPATIENT
Start: 2020-08-19 | End: 2020-08-23

## 2020-08-19 RX ORDER — SODIUM CHLORIDE 9 MG/ML
1000 INJECTION INTRAMUSCULAR; INTRAVENOUS; SUBCUTANEOUS ONCE
Refills: 0 | Status: COMPLETED | OUTPATIENT
Start: 2020-08-19 | End: 2020-08-19

## 2020-08-19 RX ORDER — OXYCODONE HYDROCHLORIDE 5 MG/1
10 TABLET ORAL EVERY 4 HOURS
Refills: 0 | Status: DISCONTINUED | OUTPATIENT
Start: 2020-08-19 | End: 2020-08-20

## 2020-08-19 RX ORDER — ONDANSETRON 8 MG/1
4 TABLET, FILM COATED ORAL ONCE
Refills: 0 | Status: DISCONTINUED | OUTPATIENT
Start: 2020-08-19 | End: 2020-08-20

## 2020-08-19 RX ORDER — CHLORHEXIDINE GLUCONATE 213 G/1000ML
1 SOLUTION TOPICAL ONCE
Refills: 0 | Status: DISCONTINUED | OUTPATIENT
Start: 2020-08-19 | End: 2020-08-24

## 2020-08-19 RX ADMIN — Medication 650 MILLIGRAM(S): at 11:20

## 2020-08-19 RX ADMIN — SODIUM CHLORIDE 1000 MILLILITER(S): 9 INJECTION INTRAMUSCULAR; INTRAVENOUS; SUBCUTANEOUS at 14:50

## 2020-08-19 RX ADMIN — OXYCODONE HYDROCHLORIDE 5 MILLIGRAM(S): 5 TABLET ORAL at 16:01

## 2020-08-19 RX ADMIN — HYDROMORPHONE HYDROCHLORIDE 0.5 MILLIGRAM(S): 2 INJECTION INTRAMUSCULAR; INTRAVENOUS; SUBCUTANEOUS at 19:55

## 2020-08-19 RX ADMIN — HYDROMORPHONE HYDROCHLORIDE 0.5 MILLIGRAM(S): 2 INJECTION INTRAMUSCULAR; INTRAVENOUS; SUBCUTANEOUS at 20:10

## 2020-08-19 RX ADMIN — MORPHINE SULFATE 2 MILLIGRAM(S): 50 CAPSULE, EXTENDED RELEASE ORAL at 11:21

## 2020-08-19 NOTE — ED ADULT NURSE NOTE - OBJECTIVE STATEMENT
80 year old male, alert and oriented x4, breathing with ease on room air. Patient reports walking with walker and cane at baseline and when getting up to walk this morning with walker to go to the restroom but after talking a couple of steps, patient reports hearing a pop and falling, denies hitting head or LOC. Patient reports 10/10 to right hip area with movement in stretcher and  3/10 to right hip area without activity. + pedal pulses to right foot, externally rotated and limited ROM. Patient awaiting for xray at this time, will continue to monitor.

## 2020-08-19 NOTE — ED ADULT NURSE NOTE - NSIMPLEMENTINTERV_GEN_ALL_ED
Implemented All Fall Risk Interventions:  [place] to call system. Call bell, personal items and telephone within reach. Instruct patient to call for assistance. Room bathroom lighting operational. Non-slip footwear when patient is off stretcher. Physically safe environment: no spills, clutter or unnecessary equipment. Stretcher in lowest position, wheels locked, appropriate side rails in place. Provide visual cue, wrist band, yellow gown, etc. Monitor gait and stability. Monitor for mental status changes and reorient to person, place, and time. Review medications for side effects contributing to fall risk. Reinforce activity limits and safety measures with patient and family.

## 2020-08-19 NOTE — H&P ADULT - ASSESSMENT
Admit to Ortho 9T Dr Foreman   Medicine hospitalist consulted for clearance for possible OR today   NPO

## 2020-08-19 NOTE — ED PROVIDER NOTE - NS ED ROS FT
Gen: No fever, normal appetite  Eyes: No eye irritation or discharge  ENT: No ear pain, congestion, sore throat  Resp: No cough or trouble breathing  Cardiovascular: No chest pain or palpitation  Gastroenteric: No nausea/vomiting, diarrhea, constipation  :  No change in urine output; no dysuria  MS: hip pain   Skin: No rashes  Neuro: No headache; no abnormal movements  Remainder negative, except as per the HPI

## 2020-08-19 NOTE — ED PROVIDER NOTE - OBJECTIVE STATEMENT
79yo m w/ PMH of HTN, CKD3, GERD with Dyspepsia/H. Pylori (10/2019), distant GBS, Decompensated Alcoholic Cirrhosis (now sober, off transplant list at [facility] as improved, w/ Small Grade 1 Esophogeal Varices (EGD 9/19) w/ hx Esophageal Bleeding s/p banding 2018 on Propanolol, hx of Hepatic Encephalopathy on Lactulose, Porcelain Gallbladder, known R Rectus Sheath Mass, Liver lesion and R Renal pole lesions of unclear etiology, SMA stenosis, Pancreatitis s/p ERCP, p/w fall, pt usually walks w/ walker however this morning was ambulating w/o, tripped and fell. Sudden onset pain to right hip. No tingling or weakness.

## 2020-08-19 NOTE — ED PROVIDER NOTE - PHYSICAL EXAMINATION
GEN - NAD; well appearing; A+O x3   HEAD - NC/AT     EYES - EOMI, no conjunctival pallor, no scleral icterus  ENT -   mucous membranes  moist , no discharge      NECK - Neck supple  PULM - CTA b/l,  symmetric breath sounds  COR -  RRR, S1 S2, no murmurs  ABD - , ND, NT, soft, no guarding, no rebound, no masses    BACK - no CVA tenderness, nontender spine     EXTREMS - RLE right externally rotated and shortened. distal pulses palpable   SKIN - no rash or bruising      NEUROLOGIC - alert, sensation nl, motor 5/5 RUE/LUE/RLE/LLE

## 2020-08-19 NOTE — CONSULT NOTE ADULT - SUBJECTIVE AND OBJECTIVE BOX
KETAN TIPTON  80y  Male      Patient is a 80y old  Male who presents with a chief complaint of right hip pain (19 Aug 2020 14:02)      INTERVAL HPI/OVERNIGHT EVENTS:    The patient is an 79 yo man with PMH MH of HTN, CKD3, GERD with Dyspepsia/H. Pylori (10/2019), distant GBS, Decompensated Alcoholic Cirrhosis (now sober, off transplant list at [facility] as improved, w/ Small Grade 1 Esophogeal Varices p/w mechanical fall found to have R basicervical FN fracture. Pt had recent admission 05/20 fro acute blood loss anemia 2/2 melena from variceal bleed s/p banding. At this time pt denies fevers, chills, dypsnea, chest pain, palpitations. Discussed with patient and wife. Has had general anestesia prior w/o complication for ankle fracture and b/l hernia repair. No known side effects of anestetics. No bleeding history, DVT or Pulmonary embolism. No AC use including no ASA, coumadin, noac. Pt and wife deny any cardiac history including no CABG, PCI, arrhythmia. No smoking, SUNNY history, no oxygen use at home, no CPAP. Last known EtoH was 6 months, no history of withdrawals.        REVIEW OF SYSTEMS:  CONSTITUTIONAL: No fever, weight loss, or fatigue  EYES: No eye pain, visual disturbances, or discharge  ENMT:  No difficulty hearing, tinnitus, vertigo; No sinus or throat pain  NECK: No pain or stiffness  BREASTS: No pain, masses, or nipple discharge  RESPIRATORY: No cough, wheezing, chills or hemoptysis; No shortness of breath  CARDIOVASCULAR: No chest pain, palpitations, dizziness, or leg swelling  GASTROINTESTINAL: No abdominal or epigastric pain. No nausea, vomiting, or hematemesis; No diarrhea or constipation. No melena or hematochezia.  GENITOURINARY: No dysuria, frequency, hematuria, or incontinence  NEUROLOGICAL: No headaches, memory loss, loss of strength, numbness, or tremors  SKIN: No itching, burning, rashes, or lesions   LYMPH NODES: No enlarged glands  ENDOCRINE: No heat or cold intolerance; No hair loss  MUSCULOSKELETAL: R hip pain; No muscle, back, or extremity pain  PSYCHIATRIC: No depression, anxiety, mood swings, or difficulty sleeping  HEME/LYMPH: No easy bruising, or bleeding gums  ALLERY AND IMMUNOLOGIC: No hives or eczema    T(C): 36.5 (08-19-20 @ 15:45), Max: 36.5 (08-19-20 @ 15:45)  HR: 57 (08-19-20 @ 15:45) (53 - 57)  BP: 180/72 (08-19-20 @ 15:45) (168/77 - 186/87)  RR: 18 (08-19-20 @ 15:45) (18 - 18)  SpO2: 100% (08-19-20 @ 15:45) (99% - 100%)  Wt(kg): --Vital Signs Last 24 Hrs  T(C): 36.5 (19 Aug 2020 15:45), Max: 36.5 (19 Aug 2020 15:45)  T(F): 97.7 (19 Aug 2020 15:45), Max: 97.7 (19 Aug 2020 15:45)  HR: 57 (19 Aug 2020 15:45) (53 - 57)  BP: 180/72 (19 Aug 2020 15:45) (168/77 - 186/87)  BP(mean): --  RR: 18 (19 Aug 2020 15:45) (18 - 18)  SpO2: 100% (19 Aug 2020 15:45) (99% - 100%)    PHYSICAL EXAM:  GENERAL: NAD, well-groomed, well-developed  HEAD:  Atraumatic, Normocephalic  EYES: EOMI, PERRLA, conjunctiva and sclera clear  ENMT: No tonsillar erythema, exudates, or enlargement; Moist mucous membranes, Good dentition, No lesions  NECK: Supple, No JVD, Normal thyroid  NERVOUS SYSTEM:  Alert & Oriented X3, Good concentration; Motor Strength 5/5 B/L upper and lower extremities; DTRs 2+ intact and symmetric  CHEST/LUNG: Clear to percussion bilaterally; No rales, rhonchi, wheezing, or rubs  HEART: Regular rate and rhythm; No murmurs, rubs, or gallops  ABDOMEN: Soft, Nontender, Nondistended; Bowel sounds present  EXTREMITIES: R hip flexion limited by flexion/extension  LYMPH: No lymphadenopathy noted  SKIN: No rashes or lesions    Consultant(s) Notes Reviewed:  [x ] YES  [ ] NO  Care Discussed with Consultants/Other Providers [ x] YES  [ ] NO    LABS:                        14.1   9.22  )-----------( 142      ( 19 Aug 2020 11:30 )             44.3     08-19    x   |  x   |  x   ----------------------------<  x   4.7   |  x   |  x     Ca    10.0      19 Aug 2020 11:30  Phos  2.7     08-19  Mg     1.7     08-19    TPro  8.3  /  Alb  3.9  /  TBili  0.7  /  DBili  x   /  AST  45<H>  /  ALT  17  /  AlkPhos  132<H>  08-19    PT/INR - ( 19 Aug 2020 11:30 )   PT: 17.0 SEC;   INR: 1.52          PTT - ( 19 Aug 2020 11:30 )  PTT:36.5 SEC    CAPILLARY BLOOD GLUCOSE                RADIOLOGY & ADDITIONAL TESTS:  < from: Xray Hip 2-3 Views, Right (08.19.20 @ 14:44) >  AP pelvis and separate dedicated AP right hip from 8/19/2020 at 1444. Reviewed in conjunction with radiographs from earlier the same day.    IMPRESSION:  Redemonstrated slightly impacted right hip basicervical fracture. No dislocations or additional fractures.    Intact pelvic and obturator rings and symmetrically aligned and spaced SI joints and pubic symphysis.    Preserved bilateral hip joint spaces.    No discrete lytic or blastic lesions.    < end of copied text >        Imaging Personally Reviewed:  [ ] YES  [ ] NO KETAN TIPTON  80y  Male      Patient is a 80y old  Male who presents with a chief complaint of right hip pain (19 Aug 2020 14:02)      INTERVAL HPI/OVERNIGHT EVENTS:    The patient is an 81 yo man with PMH MH of HTN, CKD3, GERD with Dyspepsia/H. Pylori (10/2019), distant GBS, Decompensated Alcoholic Cirrhosis (now sober, off transplant list at [facility] as improved, w/ Small Grade 1 Esophogeal Varices p/w mechanical fall found to have R basicervical FN fracture. Pt had recent admission 05/20 fro acute blood loss anemia 2/2 melena from variceal bleed s/p banding. At this time pt denies fevers, chills, dypsnea, chest pain, palpitations. Discussed with patient and wife. Has had general anestesia prior w/o complication for ankle fracture and b/l hernia repair. No known side effects of anestetics. No bleeding history, DVT or Pulmonary embolism. No AC use including no ASA, coumadin, noac. Pt and wife deny any cardiac history including no CABG, PCI, arrhythmia. No smoking, SUNNY history, no oxygen use at home, no CPAP. Last known EtoH was 6 months, no history of withdrawals.        REVIEW OF SYSTEMS:  CONSTITUTIONAL: No fever, weight loss, or fatigue  EYES: No eye pain, visual disturbances, or discharge  ENMT:  No difficulty hearing, tinnitus, vertigo; No sinus or throat pain  NECK: No pain or stiffness  BREASTS: No pain, masses, or nipple discharge  RESPIRATORY: No cough, wheezing, chills or hemoptysis; No shortness of breath  CARDIOVASCULAR: No chest pain, palpitations, dizziness, or leg swelling  GASTROINTESTINAL: No abdominal or epigastric pain. No nausea, vomiting, or hematemesis; No diarrhea or constipation. No melena or hematochezia.  GENITOURINARY: No dysuria, frequency, hematuria, or incontinence  NEUROLOGICAL: No headaches, memory loss, loss of strength, numbness, or tremors  SKIN: No itching, burning, rashes, or lesions   LYMPH NODES: No enlarged glands  ENDOCRINE: No heat or cold intolerance; No hair loss  MUSCULOSKELETAL: R hip pain; No muscle, back, or extremity pain  PSYCHIATRIC: No depression, anxiety, mood swings, or difficulty sleeping  HEME/LYMPH: No easy bruising, or bleeding gums  ALLERY AND IMMUNOLOGIC: No hives or eczema    PAST MEDICAL & SURGICAL HISTORY:  Porcelain gallbladder  Liver cirrhosis  Guillain-Weatherford syndrome  HTN (hypertension)  H/O inguinal hernia repair    Home Medications:    Propanalol 10 mg bid  Pantoprazole 40 mg QD  furosemide 20 mg QD  Lactulose 10 mg/15ml prn    Allergies    No Known Allergies    Intolerances    Social History:    Pt lives at home with wife, retired, no prior smoking history, reports last drink 6 months prior.     T(C): 36.5 (08-19-20 @ 15:45), Max: 36.5 (08-19-20 @ 15:45)  HR: 57 (08-19-20 @ 15:45) (53 - 57)  BP: 180/72 (08-19-20 @ 15:45) (168/77 - 186/87)  RR: 18 (08-19-20 @ 15:45) (18 - 18)  SpO2: 100% (08-19-20 @ 15:45) (99% - 100%)  Wt(kg): --Vital Signs Last 24 Hrs  T(C): 36.5 (19 Aug 2020 15:45), Max: 36.5 (19 Aug 2020 15:45)  T(F): 97.7 (19 Aug 2020 15:45), Max: 97.7 (19 Aug 2020 15:45)  HR: 57 (19 Aug 2020 15:45) (53 - 57)  BP: 180/72 (19 Aug 2020 15:45) (168/77 - 186/87)  BP(mean): --  RR: 18 (19 Aug 2020 15:45) (18 - 18)  SpO2: 100% (19 Aug 2020 15:45) (99% - 100%)    PHYSICAL EXAM:  GENERAL: NAD, well-groomed, well-developed  HEAD:  Atraumatic, Normocephalic  EYES: EOMI, PERRLA, conjunctiva and sclera clear  ENMT: No tonsillar erythema, exudates, or enlargement; Moist mucous membranes, Good dentition, No lesions  NECK: Supple, No JVD, Normal thyroid  NERVOUS SYSTEM:  Alert & Oriented X3, Good concentration; Motor Strength 5/5 B/L upper and lower extremities; DTRs 2+ intact and symmetric  CHEST/LUNG: Clear to percussion bilaterally; No rales, rhonchi, wheezing, or rubs  HEART: Regular rate and rhythm; No murmurs, rubs, or gallops  ABDOMEN: Soft, Nontender, Nondistended; Bowel sounds present  EXTREMITIES: R hip flexion limited by flexion/extension  LYMPH: No lymphadenopathy noted  SKIN: No rashes or lesions    Consultant(s) Notes Reviewed:  [x ] YES  [ ] NO  Care Discussed with Consultants/Other Providers [ x] YES  [ ] NO    LABS:                        14.1   9.22  )-----------( 142      ( 19 Aug 2020 11:30 )             44.3     08-19    x   |  x   |  x   ----------------------------<  x   4.7   |  x   |  x     Ca    10.0      19 Aug 2020 11:30  Phos  2.7     08-19  Mg     1.7     08-19    TPro  8.3  /  Alb  3.9  /  TBili  0.7  /  DBili  x   /  AST  45<H>  /  ALT  17  /  AlkPhos  132<H>  08-19    PT/INR - ( 19 Aug 2020 11:30 )   PT: 17.0 SEC;   INR: 1.52          PTT - ( 19 Aug 2020 11:30 )  PTT:36.5 SEC    CAPILLARY BLOOD GLUCOSE                RADIOLOGY & ADDITIONAL TESTS:  < from: Xray Hip 2-3 Views, Right (08.19.20 @ 14:44) >  AP pelvis and separate dedicated AP right hip from 8/19/2020 at 1444. Reviewed in conjunction with radiographs from earlier the same day.    IMPRESSION:  Redemonstrated slightly impacted right hip basicervical fracture. No dislocations or additional fractures.    Intact pelvic and obturator rings and symmetrically aligned and spaced SI joints and pubic symphysis.    Preserved bilateral hip joint spaces.    No discrete lytic or blastic lesions.    < end of copied text >        Imaging Personally Reviewed:  [ ] YES  [ ] NO

## 2020-08-19 NOTE — ED ADULT NURSE NOTE - CHPI ED NUR SYMPTOMS NEG
no bleeding/no confusion/no abrasion/no vomiting/no fever/no tingling/no numbness/no loss of consciousness

## 2020-08-19 NOTE — H&P ADULT - HISTORY OF PRESENT ILLNESS
81 yo male s/p mechanical fall on way to bathroom.  Pt is community walker with walker/cane, did not use on way to bathroom this am, tripped and fell.  Denies head trauma , syncope. Pt states unable to get up secondary to right hip pain.  [facility] , XR reveal right basicervical FN fracture

## 2020-08-19 NOTE — H&P ADULT - EXTREMITIES COMMENTS
right lower extremity skin C/D/I ,  Mild short/ ext rotated, + pain with log roll and heel strike   EHL/ GC/TA 5/5 sensory intact, DP 2+

## 2020-08-19 NOTE — ED ADULT TRIAGE NOTE - CHIEF COMPLAINT QUOTE
pt. brought from home s/p trip and fall while ambulating w/ a walker, states he "heard a pop", RLE shortened and externally rotated, denies dizziness prior to fall, did not hit his head or LOC. PMHx HTN,  liver cirrhosis, Guillain-Barré.

## 2020-08-19 NOTE — H&P ADULT - NSICDXPASTMEDICALHX_GEN_ALL_CORE_FT
PAST MEDICAL HISTORY:  Guillain-Risco syndrome     HTN (hypertension)     Liver cirrhosis     Porcelain gallbladder

## 2020-08-19 NOTE — PROGRESS NOTE ADULT - SUBJECTIVE AND OBJECTIVE BOX
Patient was seen and examined at bedside. Pain is controlled at the moment. Per PACU, patient becomes tachycardic with periods of bradycardia. Per PACU nurses, noticed abnormal changes on EKG while performing. Pt is asymptomatic with no complaints of chest pain, SOB, dizziness, N/V/D, HA.    Vital Signs Last 24 Hrs  T(C): 36.4 (19 Aug 2020 19:45), Max: 36.8 (19 Aug 2020 17:13)  T(F): 97.5 (19 Aug 2020 19:45), Max: 98.3 (19 Aug 2020 17:13)  HR: 77 (19 Aug 2020 20:00) (53 - 107)  BP: 97/75 (19 Aug 2020 20:00) (97/75 - 186/87)  BP(mean): 80 (19 Aug 2020 20:00) (80 - 98)  RR: 21 (19 Aug 2020 20:00) (15 - 22)  SpO2: 98% (19 Aug 2020 20:00) (98% - 100%)    GEN: NAD  R LE: Proximal and Distal Dressing C/D/I.   B/L LE: Motor intact + EHL/FHL/TA/GS in the b/l LE. Sensation is grossly intact in the bilateral distal extremities. Extremities are warm, compartments are soft, DP 1+ b/l LE.     Labs:                          12.5   8.39  )-----------( 144      ( 19 Aug 2020 20:15 )             39.5       08-19    140  |  110<H>  |  36<H>  ----------------------------<  111<H>  4.4   |  18<L>  |  1.57<H>    Ca    8.7      19 Aug 2020 20:15  Phos  2.7     08-19  Mg     1.7     08-19    TPro  8.3  /  Alb  3.9  /  TBili  0.7  /  DBili  x   /  AST  45<H>  /  ALT  17  /  AlkPhos  132<H>  08-19      A/P: Patient is a 80y y/o Male s/p right IMN, POD #0  -Pain control/analgesia  -Anticoagulation - Lovenox  -Incentive spirometry  -Venodynes  -F/U AM Labs  -PT/OT/WBAT  -FU troponins, CKMB, pro-BNP   -FU TTE  -Pt will be admitted to telemetry for further observation  -Notify orthopedics with any questions

## 2020-08-20 ENCOUNTER — TRANSCRIPTION ENCOUNTER (OUTPATIENT)
Age: 80
End: 2020-08-20

## 2020-08-20 DIAGNOSIS — Z98.890 OTHER SPECIFIED POSTPROCEDURAL STATES: ICD-10-CM

## 2020-08-20 DIAGNOSIS — I10 ESSENTIAL (PRIMARY) HYPERTENSION: ICD-10-CM

## 2020-08-20 DIAGNOSIS — K74.60 UNSPECIFIED CIRRHOSIS OF LIVER: ICD-10-CM

## 2020-08-20 DIAGNOSIS — Z29.9 ENCOUNTER FOR PROPHYLACTIC MEASURES, UNSPECIFIED: ICD-10-CM

## 2020-08-20 DIAGNOSIS — N17.9 ACUTE KIDNEY FAILURE, UNSPECIFIED: ICD-10-CM

## 2020-08-20 LAB
ANION GAP SERPL CALC-SCNC: 18 MMO/L — HIGH (ref 7–14)
BUN SERPL-MCNC: 37 MG/DL — HIGH (ref 7–23)
CALCIUM SERPL-MCNC: 8.9 MG/DL — SIGNIFICANT CHANGE UP (ref 8.4–10.5)
CHLORIDE SERPL-SCNC: 104 MMOL/L — SIGNIFICANT CHANGE UP (ref 98–107)
CO2 SERPL-SCNC: 14 MMOL/L — LOW (ref 22–31)
CREAT SERPL-MCNC: 1.5 MG/DL — HIGH (ref 0.5–1.3)
GLUCOSE SERPL-MCNC: 146 MG/DL — HIGH (ref 70–99)
HCT VFR BLD CALC: 35.8 % — LOW (ref 39–50)
HGB BLD-MCNC: 11.4 G/DL — LOW (ref 13–17)
MCHC RBC-ENTMCNC: 26.3 PG — LOW (ref 27–34)
MCHC RBC-ENTMCNC: 31.8 % — LOW (ref 32–36)
MCV RBC AUTO: 82.5 FL — SIGNIFICANT CHANGE UP (ref 80–100)
NRBC # FLD: 0 K/UL — SIGNIFICANT CHANGE UP (ref 0–0)
PLATELET # BLD AUTO: 146 K/UL — LOW (ref 150–400)
PMV BLD: 11.4 FL — SIGNIFICANT CHANGE UP (ref 7–13)
POTASSIUM SERPL-MCNC: 4.8 MMOL/L — SIGNIFICANT CHANGE UP (ref 3.5–5.3)
POTASSIUM SERPL-SCNC: 4.8 MMOL/L — SIGNIFICANT CHANGE UP (ref 3.5–5.3)
RBC # BLD: 4.34 M/UL — SIGNIFICANT CHANGE UP (ref 4.2–5.8)
RBC # FLD: 16.9 % — HIGH (ref 10.3–14.5)
SODIUM SERPL-SCNC: 136 MMOL/L — SIGNIFICANT CHANGE UP (ref 135–145)
TROPONIN T, HIGH SENSITIVITY: 24 NG/L — SIGNIFICANT CHANGE UP (ref ?–14)
WBC # BLD: 8.75 K/UL — SIGNIFICANT CHANGE UP (ref 3.8–10.5)
WBC # FLD AUTO: 8.75 K/UL — SIGNIFICANT CHANGE UP (ref 3.8–10.5)

## 2020-08-20 PROCEDURE — 99233 SBSQ HOSP IP/OBS HIGH 50: CPT

## 2020-08-20 PROCEDURE — 93306 TTE W/DOPPLER COMPLETE: CPT | Mod: 26

## 2020-08-20 PROCEDURE — 99222 1ST HOSP IP/OBS MODERATE 55: CPT

## 2020-08-20 RX ORDER — ONDANSETRON 8 MG/1
4 TABLET, FILM COATED ORAL EVERY 6 HOURS
Refills: 0 | Status: DISCONTINUED | OUTPATIENT
Start: 2020-08-20 | End: 2020-08-24

## 2020-08-20 RX ORDER — PANTOPRAZOLE SODIUM 20 MG/1
40 TABLET, DELAYED RELEASE ORAL DAILY
Refills: 0 | Status: DISCONTINUED | OUTPATIENT
Start: 2020-08-20 | End: 2020-08-24

## 2020-08-20 RX ORDER — ACETAMINOPHEN 500 MG
975 TABLET ORAL EVERY 8 HOURS
Refills: 0 | Status: COMPLETED | OUTPATIENT
Start: 2020-08-20 | End: 2020-08-23

## 2020-08-20 RX ORDER — ASCORBIC ACID 60 MG
500 TABLET,CHEWABLE ORAL DAILY
Refills: 0 | Status: DISCONTINUED | OUTPATIENT
Start: 2020-08-20 | End: 2020-08-24

## 2020-08-20 RX ORDER — SENNA PLUS 8.6 MG/1
2 TABLET ORAL AT BEDTIME
Refills: 0 | Status: DISCONTINUED | OUTPATIENT
Start: 2020-08-20 | End: 2020-08-24

## 2020-08-20 RX ORDER — OXYCODONE HYDROCHLORIDE 5 MG/1
2.5 TABLET ORAL EVERY 4 HOURS
Refills: 0 | Status: DISCONTINUED | OUTPATIENT
Start: 2020-08-20 | End: 2020-08-24

## 2020-08-20 RX ORDER — OXYCODONE HYDROCHLORIDE 5 MG/1
5 TABLET ORAL EVERY 4 HOURS
Refills: 0 | Status: DISCONTINUED | OUTPATIENT
Start: 2020-08-20 | End: 2020-08-24

## 2020-08-20 RX ORDER — FUROSEMIDE 40 MG
20 TABLET ORAL DAILY
Refills: 0 | Status: DISCONTINUED | OUTPATIENT
Start: 2020-08-20 | End: 2020-08-24

## 2020-08-20 RX ORDER — SODIUM CHLORIDE 9 MG/ML
500 INJECTION INTRAMUSCULAR; INTRAVENOUS; SUBCUTANEOUS ONCE
Refills: 0 | Status: COMPLETED | OUTPATIENT
Start: 2020-08-20 | End: 2020-08-20

## 2020-08-20 RX ADMIN — SODIUM CHLORIDE 75 MILLILITER(S): 9 INJECTION INTRAMUSCULAR; INTRAVENOUS; SUBCUTANEOUS at 01:58

## 2020-08-20 RX ADMIN — ENOXAPARIN SODIUM 40 MILLIGRAM(S): 100 INJECTION SUBCUTANEOUS at 12:58

## 2020-08-20 RX ADMIN — SODIUM CHLORIDE 75 MILLILITER(S): 9 INJECTION INTRAMUSCULAR; INTRAVENOUS; SUBCUTANEOUS at 21:00

## 2020-08-20 RX ADMIN — PANTOPRAZOLE SODIUM 40 MILLIGRAM(S): 20 TABLET, DELAYED RELEASE ORAL at 12:58

## 2020-08-20 RX ADMIN — Medication 500 MILLIGRAM(S): at 12:58

## 2020-08-20 RX ADMIN — Medication 975 MILLIGRAM(S): at 18:03

## 2020-08-20 RX ADMIN — FAMOTIDINE 20 MILLIGRAM(S): 10 INJECTION INTRAVENOUS at 12:58

## 2020-08-20 RX ADMIN — Medication 1 TABLET(S): at 12:58

## 2020-08-20 RX ADMIN — LACTULOSE 15 GRAM(S): 10 SOLUTION ORAL at 05:13

## 2020-08-20 RX ADMIN — LACTULOSE 15 GRAM(S): 10 SOLUTION ORAL at 17:21

## 2020-08-20 RX ADMIN — SODIUM CHLORIDE 1000 MILLILITER(S): 9 INJECTION INTRAMUSCULAR; INTRAVENOUS; SUBCUTANEOUS at 05:28

## 2020-08-20 RX ADMIN — Medication 975 MILLIGRAM(S): at 17:21

## 2020-08-20 NOTE — PROGRESS NOTE ADULT - PROBLEM SELECTOR PLAN 1
-Pain well controlled; continue management and pain control per ortho recs with tylenol, oxycodone prn  -c/w bowel regimen  -c/w incentive spirometer use  -c/w PT

## 2020-08-20 NOTE — DISCHARGE NOTE PROVIDER - NSDCMRMEDTOKEN_GEN_ALL_CORE_FT
furosemide 20 mg oral tablet: 1 tab(s) orally once a day  lactulose 10 g/15 mL oral syrup: 22.5 milliliter(s) orally 2 times a day  pantoprazole 40 mg oral delayed release tablet: 1 tab(s) orally 2 times a day  propranolol 10 mg oral tablet: 1 tab(s) orally 2 times a day acetaminophen 325 mg oral tablet: 3 tab(s) orally every 8 hours for 5days  ascorbic acid 500 mg oral tablet: 1 tab(s) orally once a day  calcium-vitamin D 500 mg-200 intl units (5 mcg) oral tablet: 1 tab(s) orally once a day  enoxaparin: 40 milligram(s) subcutaneous once a day  furosemide 20 mg oral tablet: 1 tab(s) orally once a day  lactulose 10 g/15 mL oral syrup: 22.5 milliliter(s) orally 2 times a day  oxyCODONE: 2.5 milligram(s) orally every 4 hours as needed for Mild or Moderate pain  begin tapering off as pain decreases   oxyCODONE 5 mg oral tablet: 1 tab(s) orally every 4 hours as needed for Severe Pain  begin tapering off as pain decreases   pantoprazole 40 mg oral delayed release tablet: 1 tab(s) orally once a day  propranolol 10 mg oral tablet: 1 tab(s) orally 2 times a day  senna oral tablet: 2 tab(s) orally once a day (at bedtime)  discharge home with over the counter for constipation caused by pain meds

## 2020-08-20 NOTE — OCCUPATIONAL THERAPY INITIAL EVALUATION ADULT - RANGE OF MOTION EXAMINATION, UPPER EXTREMITY
bilateral UE Active ROM was WFL  (within functional limits)/except Left Shoulder Flexion Active ROM 0-90 degrees/bilateral UE Active Assistive ROM was WFL  (within functional limits)

## 2020-08-20 NOTE — PROGRESS NOTE ADULT - SUBJECTIVE AND OBJECTIVE BOX
Ortho Post-Op Progress Note    80M s/p R IMN, POD#1  Patient is seen and examined at bedside; no acute events overnight. Pain is well controlled. Denies CP, SOB, or dizziness.     Vital Signs Last 24 Hrs  T(C): 36.3 (20 Aug 2020 05:03), Max: 36.8 (19 Aug 2020 17:13)  T(F): 97.4 (20 Aug 2020 05:03), Max: 98.3 (19 Aug 2020 17:13)  HR: 88 (20 Aug 2020 06:04) (53 - 113)  BP: 102/63 (20 Aug 2020 06:04) (85/56 - 186/87)  BP(mean): 75 (19 Aug 2020 23:00) (61 - 98)  RR: 16 (20 Aug 2020 05:03) (8 - 22)  SpO2: 100% (20 Aug 2020 05:03) (95% - 100%)    PHYSICAL EXAM:  General: Sitting up in bed, NAD  RLE: Distal dressing mildly saturated with serosanguinous drainage. Will check later in the am and change if needed.   Right LE: Motor intact + EHL/FHL/TA/GS.  Sensation is grossly intact.  Extremity warm, compartments soft, compressible. No calf tenderness. DP 2+   Left LE: Motor intact +EHL/FHL/TA/GS. Sensation is grossly intact. Extremity warm, compartments soft, compressible. No calf tenderness. DP2+    Labs:                        12.5   8.39  )-----------( 144      ( 19 Aug 2020 20:15 )             39.5       08-19    140  |  110<H>  |  36<H>  ----------------------------<  111<H>  4.4   |  18<L>  |  1.57<H>    Ca    8.7      19 Aug 2020 20:15  Phos  2.7     08-19  Mg     1.7     08-19    TPro  8.3  /  Alb  3.9  /  TBili  0.7  /  DBili  x   /  AST  45<H>  /  ALT  17  /  AlkPhos  132<H>  08-19 Ortho Post-Op Progress Note    80M s/p R IMN, POD#1  Patient is seen and examined at bedside; no acute events overnight. Pain is well controlled. Denies CP, SOB, or dizziness.     Vital Signs Last 24 Hrs  T(C): 36.3 (20 Aug 2020 05:03), Max: 36.8 (19 Aug 2020 17:13)  T(F): 97.4 (20 Aug 2020 05:03), Max: 98.3 (19 Aug 2020 17:13)  HR: 88 (20 Aug 2020 06:04) (53 - 113)  BP: 102/63 (20 Aug 2020 06:04) (85/56 - 186/87)  BP(mean): 75 (19 Aug 2020 23:00) (61 - 98)  RR: 16 (20 Aug 2020 05:03) (8 - 22)  SpO2: 100% (20 Aug 2020 05:03) (95% - 100%)    PHYSICAL EXAM:  General: Sitting up in bed, NAD  RLE: Distal dressing mildly saturated with serosanguinous drainage. Will check later in the am and change if needed. Motor intact + EHL/FHL/TA/GS.  Sensation is grossly intact.  Extremity warm, compartments soft, compressible. No calf tenderness. DP 2+   LLE: Motor intact +EHL/FHL/TA/GS. Sensation is grossly intact. Extremity warm, compartments soft, compressible. No calf tenderness. DP2+    Labs:                        12.5   8.39  )-----------( 144      ( 19 Aug 2020 20:15 )             39.5       08-19    140  |  110<H>  |  36<H>  ----------------------------<  111<H>  4.4   |  18<L>  |  1.57<H>    Ca    8.7      19 Aug 2020 20:15  Phos  2.7     08-19  Mg     1.7     08-19    TPro  8.3  /  Alb  3.9  /  TBili  0.7  /  DBili  x   /  AST  45<H>  /  ALT  17  /  AlkPhos  132<H>  08-19

## 2020-08-20 NOTE — PROGRESS NOTE ADULT - ASSESSMENT
80M h/o HTN, CKD3, GERD with Dyspepsia/H. Pylori (10/2019), distant GBS, Decompensated Alcoholic Cirrhosis (now sober, off transplant list at [facility] as improved, w/ Small Grade 1 Esophogeal Varices p/w mechanical fall found to have R basicervical FN fracture s/p repair.    #Preoperative clearance  - Labs reviewed, INR 1.52 from underlying cirrhosis, no indication for reversal. Cr 1.66 in setting of known CKD  - EKG shows normal sinus rhythm w/ prolonged NC interval w/ first degree AVG w/ TWI in avr, III, V2, V3 with  TWI seen in III, V3 in EKG from 11/19. Can continue with home propanolol with hold parameters to hold for HR < 60.  - RCRI 0, Class I of risk for perioperative cardiac events with a risk percentage of 0.4%. Pt able to ambulate > 4 METS equivalent at home  -  score 9, 0.6% of perioperative mortality  - patient is low risk for intermediate risk procedure for perioperative cardiac event  - his is medically optimized for basicervical fracture repair    #KAY on CKD  - pt on furosemide at home for history of cirrhosis  - hold furosemide at this time in setting of KAY on CKD w/ elevated BUN  - continue to trend Cr and monitor UOP    #Cirrhosis  - alcholic liver cirrhosis w/ history of Grade 1 esophageal varices s/p banding  - c/w propanalol with hold parameters for HR < 60  - c/w home lactulose, protonix  - Hgb stable, no melana/BRBPR events at home 80M h/o HTN, CKD3, GERD with Dyspepsia/H. Pylori (10/2019), distant GBS, Decompensated Alcoholic Cirrhosis (now sober, off transplant list at [facility] as improved, w/ Small Grade 1 Esophogeal Varices p/w mechanical fall found to have R basicervical FN fracture s/p repair.    Pt able to ambulate > 4 METS equivalent at home    #Cirrhosis  - alcholic liver cirrhosis w/ history of Grade 1 esophageal varices s/p banding  - c/w propanalol with hold parameters for HR < 60  - c/w home lactulose, protonix  - Hgb stable, no melana/BRBPR events at home 80M h/o HTN, CKD3, GERD with Dyspepsia/H. Pylori (10/2019), distant GBS, Decompensated Alcoholic Cirrhosis (now sober, off transplant list at [facility] as improved, w/ Small Grade 1 Esophogeal Varices p/w mechanical fall found to have R basicervical FN fracture s/p repair.

## 2020-08-20 NOTE — CONSULT NOTE ADULT - SUBJECTIVE AND OBJECTIVE BOX
Patient is a 80y old  Male who presents with a chief complaint of right hip pain (20 Aug 2020 09:00)      HPI:  81 yo male pmh HTN, CKD, distant GBS, alcoholic cirrhosis , grade 1 esophageal varices presented s/p mechanical fall.  Patient was ambulating without his cane at time of the fall.   Pt brought to ER  where he was found to have right basicervical femoral neck fracture.  Patient s/p IM nail of R hip on 8/19/2020.  Patient reports mild pain.   , relatively hypotensive today however denies dizziness or light headedness, denies chest pain or sob.      REVIEW OF SYSTEMS  Constitutional - No fever, No weight loss, No fatigue  HEENT - No eye pain, No visual disturbances, No difficulty hearing, No tinnitus, No vertigo, No neck pain  Respiratory - No cough, No wheezing, No shortness of breath  Cardiovascular - No chest pain, No palpitations  Gastrointestinal - No abdominal pain, No nausea, No vomiting, No diarrhea, No constipation  Genitourinary - No dysuria, No frequency, No hematuria, No incontinence  Neurological - No headaches, No memory loss, + loss of strength, No numbness, No tremors  Skin - No itching, No rashes, No lesions   Endocrine - No temperature intolerance  Musculoskeletal - + joint pain  Psychiatric - No depression, No anxiety    PAST MEDICAL & SURGICAL HISTORY  Porcelain gallbladder  Liver cirrhosis  Guillain-[place] syndrome  HTN (hypertension)  H/O inguinal hernia repair      SOCIAL HISTORY  Smoking - Denied  EtOH - former etoh  Drugs - Denied    FUNCTIONAL HISTORY  Lives with his wife in house with 2 sets of 15 stairs  ambulates mod I with cane, wife assists with bathing (for supervision)    CURRENT FUNCTIONAL STATUS  pending assessment, patient going to Echo    FAMILY HISTORY   Family history of ovarian cancer (Sibling)      RECENT LABS/IMAGING  CBC Full  -  ( 20 Aug 2020 05:27 )  WBC Count : 8.75 K/uL  RBC Count : 4.34 M/uL  Hemoglobin : 11.4 g/dL  Hematocrit : 35.8 %  Platelet Count - Automated : 146 K/uL  Mean Cell Volume : 82.5 fL  Mean Cell Hemoglobin : 26.3 pg  Mean Cell Hemoglobin Concentration : 31.8 %  Auto Neutrophil # : x  Auto Lymphocyte # : x  Auto Monocyte # : x  Auto Eosinophil # : x  Auto Basophil # : x  Auto Neutrophil % : x  Auto Lymphocyte % : x  Auto Monocyte % : x  Auto Eosinophil % : x  Auto Basophil % : x    08-20    136  |  104  |  37<H>  ----------------------------<  146<H>  4.8   |  14<L>  |  1.50<H>    Ca    8.9      20 Aug 2020 05:21  Phos  2.7     08-19  Mg     1.7     08-19    TPro  8.3  /  Alb  3.9  /  TBili  0.7  /  DBili  x   /  AST  45<H>  /  ALT  17  /  AlkPhos  132<H>  08-19        VITALS  T(C): 37.1 (08-20-20 @ 09:38), Max: 37.1 (08-20-20 @ 09:38)  HR: 101 (08-20-20 @ 09:38) (57 - 113)  BP: 85/50 (08-20-20 @ 09:38) (85/50 - 180/72)  RR: 16 (08-20-20 @ 09:38) (8 - 22)  SpO2: 100% (08-20-20 @ 09:38) (95% - 100%)  Wt(kg): --    ALLERGIES  No Known Allergies      MEDICATIONS     < from: Xray Hip 2-3 Views, Right (08.19.20 @ 14:44) >  EXAM:  XR HIP 2-3V RT      EXAM:  RAD PELVIS AP ONLY        PROCEDURE DATE:  Aug 19 2020         INTERPRETATION:  CLINICAL INDICATION: right hip fracture    EXAM:  AP pelvis and separate dedicated AP right hip from 8/19/2020 at 1444. Reviewed in conjunction with radiographs from earlier the same day.    IMPRESSION:  Redemonstrated slightly impacted right hip basicervical fracture. No dislocations or additional fractures.    Intact pelvic and obturator rings and symmetrically aligned and spaced SI joints and pubic symphysis.    Preserved bilateral hip joint spaces.    No discrete lytic or blastic lesions.                < end of copied text >    acetaminophen   Tablet .. 975 milliGRAM(s) Oral every 8 hours  ascorbic acid 500 milliGRAM(s) Oral daily  calcium carbonate 1250 mG  + Vitamin D (OsCal 500 + D) 1 Tablet(s) Oral daily  chlorhexidine 2% Cloths 1 Application(s) Topical once  enoxaparin Injectable 40 milliGRAM(s) SubCutaneous daily  famotidine    Tablet 20 milliGRAM(s) Oral daily  lactulose Syrup 15 Gram(s) Oral two times a day  ondansetron Injectable 4 milliGRAM(s) IV Push every 6 hours PRN  oxyCODONE    IR 5 milliGRAM(s) Oral every 4 hours PRN  oxyCODONE    IR 2.5 milliGRAM(s) Oral every 4 hours PRN  pantoprazole    Tablet 40 milliGRAM(s) Oral daily  povidone iodine 5% Nasal Swab 1 Application(s) Both Nostrils once  senna 2 Tablet(s) Oral at bedtime  sodium chloride 0.9%. 1000 milliLiter(s) IV Continuous <Continuous>      ----------------------------------------------------------------------------------------  PHYSICAL EXAM  Constitutional - NAD, Comfortable  HEENT - NCAT   Neck - Supple, No limited ROM  Chest - no respiratory distress  Cardiovascular - no edema  Abdomen - Soft, NTND  Extremities - No C/C/E, No calf tenderness. dressing in place R hip  Neurologic Exam -                    Cognitive - Awake, Alert, AAO to self, place, date, year, (however reports he had surgery in the left hip instead of right)     Communication - Fluent, No dysarthria     Cranial Nerves - CN 2-12 intact     Motor -                     LEFT    UE - ShAB 5/5, EF 5/5, EE 5/5, WE 5/5,  5/5                    RIGHT UE - ShAB 5/5, EF 5/5, EE 5/5, WE 5/5,  5/5                    LEFT    LE - HF 4+/5, KE 4+/5, DF 5/5, PF 5/5                    RIGHT LE - HF 1/5, KE 2/5, DF 5/5, PF 5/5        Sensory - Intact to LT     OculoVestibular - No saccades, No nystagmus, VOR         Balance - WNL Static  Psychiatric - Mood stable, Affect WNL  ----------------------------------------------------------------------------------------  ASSESSMENT/PLAN  81 yo m pmh HTN, CKD, distant GBS, alcoholic cirrhosis , grade 1 esophageal varices now s/p IM nail right hip 8/19 for R femoral neck basicervical fracture, cardiology following for ekg abnormality.  No evidence of afib on tele or ekg per chart.  WBAT  Echo today   Pain -tylenol prn, oxy prn, with bowel regimen  DVT PPX - lovenox  bedside PT/OT for bed mobility, transfers, ambulation w ad, ADLs  Diet: regular  Rehab - functional eval pending, however anticipate patient will need inpatient rehab when medically cleared.     Recommend ACUTE inpatient rehabilitation for the functional deficits consisting of 3 hours of therapy/day & 24 hour RN/daily PMR physician for comorbid medical management. Will continue to follow for ongoing rehab needs and recommendations.

## 2020-08-20 NOTE — CONSULT NOTE ADULT - ASSESSMENT
81 yo man with PMH MH of HTN, CKD3, GERD with Dyspepsia/H. Pylori (10/2019), distant GBS, Decompensated Alcoholic Cirrhosis (now sober, off transplant list at [facility] as improved, w/ Small Grade 1 Esophogeal Varices p/w mechanical fall found to have R basicervical FN fracture.    #Preoperative clearance  - Labs reviewed, INR 1.52 from underlying cirrhosis, no indication for reversal. Cr 1.66 in setting of known CKD  - EKG shows normal sinus rhythm w/ prolonged ND interval w/ first degree AVG w/ TWI in avr, III, V2, V3 with  TWI seen in III, V3 in EKG from 11/19. Can continue with home propanolol with hold parameters to hold for HR < 60.  - RCRI 0, Class I of risk for perioperative cardiac events with a risk percentage of 0.4%. Pt able to ambulate > 4 METS equivalent at home  -  score 9, 0.6% of perioperative mortality  - patient is low risk for intermediate risk procedure for perioperative cardiac event  - his is medically optimized for basicervical fracture repair    #KAY on CKD  - pt on furosemide at home for history of cirrhosis  - hold furosemide at this time in setting of KAY on CKD w/ elevated BUN  - continue to trend Cr and monitor UOP    #Cirrhosis  - alcholic liver cirrhosis w/ history of Grade 1 esophageal varices s/p banding  - c/w propanalol with hold parameters for HR < 60  - c/w home lactulose, protonix  - Hgb stable, no melana/BRBPR events at home
Abnormal EKG  RBBB  QR pattern in precordial leads , rule out RHF  obtain echo     HIP fx  s/p IMN   pain control   dvt proph     DVT proph  on lovenox     ?afib  NO evidence of afib on tele or ekgs

## 2020-08-20 NOTE — DISCHARGE NOTE PROVIDER - NSDCCPCAREPLAN_GEN_ALL_CORE_FT
PRINCIPAL DISCHARGE DIAGNOSIS  Diagnosis: Closed basicervical fracture of right femur  Assessment and Plan of Treatment: 80year old male is admitted for mechanical fall sustained Right hip femoral neck fracture and underwent surgery Right hip intramedullary nail 8/19/2020 without any intraoperative complications.  Patient is doing well and stable for discharge.  Patient is tolerating physical therapy: weight bearing to Right leg, out of bed for gait training, and exercises as shown by Physical Therapy.  Keep wound dressing on as is until day of office visit.  Staples/sutures if applicable, to be removed in the office 14 days from date of surgery.  Patient is on Lovenox for anticoagulation.  Orthopaedic Surgeon Dr. Foreman would like patient to call private MD/Internist for appointment postop to maintain continuity of care.  Follow up with Dr. Foreman's office in 1-2 weeks. PRINCIPAL DISCHARGE DIAGNOSIS  Diagnosis: Closed basicervical fracture of right femur  Assessment and Plan of Treatment: 80year old male is admitted for mechanical fall sustained Right hip femoral neck fracture and underwent surgery Right hip intramedullary nail 8/19/2020 without any intraoperative complications.  Patient is doing well and stable for discharge.  Patient is tolerating physical therapy: weight bearing to Right leg, out of bed for gait training, and exercises as shown by Physical Therapy.  Keep wound dressing on as is until day of office visit.  Staples/sutures if applicable, to be removed in the office 14 days from date of surgery.  Patient is on Lovenox for anticoagulation.  Orthopaedic Surgeon Dr. Foreman would like patient to call private MD/Internist for appointment postop to maintain continuity of care.  Follow up with Dr. Foreman's office in 1-2 weeks.  Patient was also seen by Cardiology Dr. Singh for perioperative optimization - ECHO: Mild pulm HTN with normal RV and LV function, ef 72%, EKG: RBBB.

## 2020-08-20 NOTE — OCCUPATIONAL THERAPY INITIAL EVALUATION ADULT - MD ORDER
Occupational Therapy (OT) to evaluate and treat. Occupational Therapy (OT) to evaluate and treat. Per RN, pt is okay to participate in OT evaluation and perform activity as tolerated.

## 2020-08-20 NOTE — PROGRESS NOTE ADULT - PROBLEM SELECTOR PLAN 4
- alcholic liver cirrhosis w/ history of Grade 1 esophageal varices s/p banding  - resume propanalol with hold parameters for HR < 60  - c/w home lactulose, protonix  - Hgb stable, no melana/BRBPR events at home - alcholic liver cirrhosis w/ history of Grade 1 esophageal varices s/p banding  - resume propanalol with hold parameters for HR < 60  - c/w home lactulose, protonix and lasix  - Hgb stable, no melana/BRBPR events at home

## 2020-08-20 NOTE — DISCHARGE NOTE PROVIDER - CARE PROVIDER_API CALL
Trevor Foreman J  ORTHOPAEDIC SURGERY  [address] [place] [id number]  Phone: [phone]  Fax: [phone]  Follow Up Time: 2 weeks

## 2020-08-20 NOTE — OCCUPATIONAL THERAPY INITIAL EVALUATION ADULT - GENERAL OBSERVATIONS, REHAB EVAL
Pt. received semisupine in bed. No acute distress. Patient agreed to evaluation from Occupational Therapist. +Clean dry intact dressing to Right Hip, +IV, +Tele.

## 2020-08-20 NOTE — DISCHARGE NOTE PROVIDER - NSDCCPTREATMENT_GEN_ALL_CORE_FT
PRINCIPAL PROCEDURE  Procedure: ORIF, hip, with intramedullary salome  Findings and Treatment: dictated operative note  weight bearing as tolerated to Right leg  call Surgeon's office to make appointment for 1-2 weeks from date of surgery with Dr. Foreman [phone]

## 2020-08-20 NOTE — OCCUPATIONAL THERAPY INITIAL EVALUATION ADULT - PERTINENT HX OF CURRENT PROBLEM, REHAB EVAL
Pt is an 80 year old male who presented to [facility] on 8/19/2020 s/p fall and Right hip pain. Xray of Right Hip on 8/19/2020 displayed acute appearing slightly impacted right femoral neck basicervical fracture. No dislocations or additional fractures in the remaining imaged regions. Pt is now s/p Right Hip ORIF on 8/19/2020.

## 2020-08-20 NOTE — PHYSICAL THERAPY INITIAL EVALUATION ADULT - PERTINENT HX OF CURRENT PROBLEM, REHAB EVAL
80 y.o. male with PMH of HTN, CKD, distant GBS, alcoholic cirrhosis , grade 1 esophageal varices now s/p IM nail right hip 8/19 for R femoral neck basicervical fracture, cardiology following for ekg abnormality.  No evidence of afib on tele or ekg per chart.

## 2020-08-20 NOTE — OCCUPATIONAL THERAPY INITIAL EVALUATION ADULT - LIVES WITH, PROFILE
Pt. reports he lives with his wife in a house with 5 steps to enter. Once inside, pt. reports he has a full flight of steps to negotiate to reach basement where main bedroom and bathroom are located. Per pt., he has a shower stall in his bathroom with "shower stool" available.

## 2020-08-20 NOTE — PHYSICAL THERAPY INITIAL EVALUATION ADULT - ADDITIONAL COMMENTS
Pt lives in house with wife. 14 steps to basement. Flight of stairs to kitchen (?) upstairs. Pt ambulated with cane versus rolling walker prior to admission. Pt was independent in functional activities. History of falls (reason for admission).     pt left semi-shaver in bed, NAD. +call bell. RN aware of session.

## 2020-08-20 NOTE — CONSULT NOTE ADULT - SUBJECTIVE AND OBJECTIVE BOX
CHIEF COMPLAINT:Patient is a 80y old  Male who presents with a chief complaint of right hip pain (20 Aug 2020 06:31)      HISTORY OF PRESENT ILLNESS:    This is a 80 year old gentleman with HTN, CKD, distant GBS, alcoholic cirrhosis , grade 1 esophageal varices s/p mechanical fall now with right basicervial fn fx  s/p IMN  cardiology is called for new onset afib     PAST MEDICAL & SURGICAL HISTORY:  Porcelain gallbladder  Liver cirrhosis  Guillain-[place] syndrome  HTN (hypertension)  H/O inguinal hernia repair          MEDICATIONS:  enoxaparin Injectable 40 milliGRAM(s) SubCutaneous daily        acetaminophen   Tablet .. 975 milliGRAM(s) Oral every 8 hours  ondansetron Injectable 4 milliGRAM(s) IV Push every 6 hours PRN  oxyCODONE    IR 5 milliGRAM(s) Oral every 4 hours PRN  oxyCODONE    IR 2.5 milliGRAM(s) Oral every 4 hours PRN    famotidine    Tablet 20 milliGRAM(s) Oral daily  lactulose Syrup 15 Gram(s) Oral two times a day  pantoprazole    Tablet 40 milliGRAM(s) Oral daily  senna 2 Tablet(s) Oral at bedtime      ascorbic acid 500 milliGRAM(s) Oral daily  calcium carbonate 1250 mG  + Vitamin D (OsCal 500 + D) 1 Tablet(s) Oral daily  chlorhexidine 2% Cloths 1 Application(s) Topical once  povidone iodine 5% Nasal Swab 1 Application(s) Both Nostrils once  sodium chloride 0.9%. 1000 milliLiter(s) IV Continuous <Continuous>      FAMILY HISTORY:  Family history of ovarian cancer (Sibling)      Non-contributory    SOCIAL HISTORY:    No tobacco, drugs or etoh    Allergies    No Known Allergies    Intolerances    	    REVIEW OF SYSTEMS:  as above  The rest of the 14 points ROS reviewed and except above they are unremarkable.        PHYSICAL EXAM:  T(C): 36.3 (08-20-20 @ 05:03), Max: 36.8 (08-19-20 @ 17:13)  HR: 88 (08-20-20 @ 06:04) (53 - 113)  BP: 102/63 (08-20-20 @ 06:04) (85/56 - 186/87)  RR: 16 (08-20-20 @ 05:03) (8 - 22)  SpO2: 100% (08-20-20 @ 05:03) (95% - 100%)  Wt(kg): --  I&O's Summary    19 Aug 2020 07:01  -  20 Aug 2020 07:00  --------------------------------------------------------  IN: 1380 mL / OUT: 200 mL / NET: 1180 mL    20 Aug 2020 07:01  -  20 Aug 2020 09:01  --------------------------------------------------------  IN: 150 mL / OUT: 0 mL / NET: 150 mL        Appearance: Normal	  HEENT:   no gross abnormality   Cardiovascular: Normal S1 S2,    Murmur:   Neck: JVP normal  Respiratory: Lungs clear   Gastrointestinal:  Soft, Non-tender  Skin: normal   Neuro: No gross deficits.   Psychiatry:  Mood & affect flat  Ext: No edema    LABS/DATA:    TELEMETRY: 	    ECG:  	   	  CARDIAC MARKERS:      CKMB: 3.21 ng/mL (08-19 @ 21:37)    CKMB Relative Index: Test not performed (08-19 @ 21:37)                                11.4   8.75  )-----------( 146      ( 20 Aug 2020 05:27 )             35.8     08-20    136  |  104  |  37<H>  ----------------------------<  146<H>  4.8   |  14<L>  |  1.50<H>    Ca    8.9      20 Aug 2020 05:21  Phos  2.7     08-19  Mg     1.7     08-19    TPro  8.3  /  Alb  3.9  /  TBili  0.7  /  DBili  x   /  AST  45<H>  /  ALT  17  /  AlkPhos  132<H>  08-19    proBNP: Serum Pro-Brain Natriuretic Peptide: 1303 pg/mL (08-19 @ 21:37)    Lipid Profile:   HgA1c:   TSH: Thyroid Stimulating Hormone, Serum: 4.20 uIU/mL (08-19 @ 21:37) CHIEF COMPLAINT:Patient is a 80y old  Male who presents with a chief complaint of right hip pain (20 Aug 2020 06:31)      HISTORY OF PRESENT ILLNESS:    This is a 80 year old gentleman with HTN, CKD, distant GBS, alcoholic cirrhosis , grade 1 esophageal varices s/p mechanical fall now with right basicervial fn fx  s/p IMN  cardiology is called for new onset afib   all EKG and Tele reviewed, no evidence of afib noted  pt denies any chest pain, sob, palpitation, dizziness or syncope.      PAST MEDICAL & SURGICAL HISTORY:  Porcelain gallbladder  Liver cirrhosis  Guillain-[place] syndrome  HTN (hypertension)  H/O inguinal hernia repair          MEDICATIONS:  enoxaparin Injectable 40 milliGRAM(s) SubCutaneous daily        acetaminophen   Tablet .. 975 milliGRAM(s) Oral every 8 hours  ondansetron Injectable 4 milliGRAM(s) IV Push every 6 hours PRN  oxyCODONE    IR 5 milliGRAM(s) Oral every 4 hours PRN  oxyCODONE    IR 2.5 milliGRAM(s) Oral every 4 hours PRN    famotidine    Tablet 20 milliGRAM(s) Oral daily  lactulose Syrup 15 Gram(s) Oral two times a day  pantoprazole    Tablet 40 milliGRAM(s) Oral daily  senna 2 Tablet(s) Oral at bedtime      ascorbic acid 500 milliGRAM(s) Oral daily  calcium carbonate 1250 mG  + Vitamin D (OsCal 500 + D) 1 Tablet(s) Oral daily  chlorhexidine 2% Cloths 1 Application(s) Topical once  povidone iodine 5% Nasal Swab 1 Application(s) Both Nostrils once  sodium chloride 0.9%. 1000 milliLiter(s) IV Continuous <Continuous>      FAMILY HISTORY:  Family history of ovarian cancer (Sibling)      Non-contributory    SOCIAL HISTORY:    No tobacco, drugs or etoh    Allergies    No Known Allergies    Intolerances    	    REVIEW OF SYSTEMS:  as above  The rest of the 14 points ROS reviewed and except above they are unremarkable.        PHYSICAL EXAM:  T(C): 36.3 (08-20-20 @ 05:03), Max: 36.8 (08-19-20 @ 17:13)  HR: 88 (08-20-20 @ 06:04) (53 - 113)  BP: 102/63 (08-20-20 @ 06:04) (85/56 - 186/87)  RR: 16 (08-20-20 @ 05:03) (8 - 22)  SpO2: 100% (08-20-20 @ 05:03) (95% - 100%)  Wt(kg): --  I&O's Summary    19 Aug 2020 07:01  -  20 Aug 2020 07:00  --------------------------------------------------------  IN: 1380 mL / OUT: 200 mL / NET: 1180 mL    20 Aug 2020 07:01  -  20 Aug 2020 09:01  --------------------------------------------------------  IN: 150 mL / OUT: 0 mL / NET: 150 mL      JVP: Normal  Neck: supple  Lung: clear   CV: S1 S2 , Murmur:  Abd: soft  Ext: No edema  neuro: Awake / alert  Psych: flat affect  Skin: normal      LABS/DATA:    TELEMETRY: 	  sinus   ECG:  	   	  CARDIAC MARKERS:      CKMB: 3.21 ng/mL (08-19 @ 21:37)    CKMB Relative Index: Test not performed (08-19 @ 21:37)                                11.4   8.75  )-----------( 146      ( 20 Aug 2020 05:27 )             35.8     08-20    136  |  104  |  37<H>  ----------------------------<  146<H>  4.8   |  14<L>  |  1.50<H>    Ca    8.9      20 Aug 2020 05:21  Phos  2.7     08-19  Mg     1.7     08-19    TPro  8.3  /  Alb  3.9  /  TBili  0.7  /  DBili  x   /  AST  45<H>  /  ALT  17  /  AlkPhos  132<H>  08-19    proBNP: Serum Pro-Brain Natriuretic Peptide: 1303 pg/mL (08-19 @ 21:37)    Lipid Profile:   HgA1c:   TSH: Thyroid Stimulating Hormone, Serum: 4.20 uIU/mL (08-19 @ 21:37)

## 2020-08-20 NOTE — PHYSICAL THERAPY INITIAL EVALUATION ADULT - GENERAL OBSERVATIONS, REHAB EVAL
Pt received semi-shaver in bed, left UE IV, +tele monitor, NAD. Pt agreeable to PT consultation. Cleared for PT as per RN

## 2020-08-20 NOTE — DISCHARGE NOTE PROVIDER - NSDCACTIVITY_GEN_ALL_CORE
Walking - Outdoors allowed/No heavy lifting/straining/Showering allowed/Do not make important decisions/Walking - Indoors allowed/Do not drive or operate machinery

## 2020-08-20 NOTE — DISCHARGE NOTE PROVIDER - REASON FOR ADMISSION
mechanical fall sustained Right hip femoral neck fracture  surgery Right hip intramedullary nail 8/19/2020 right hip pain

## 2020-08-20 NOTE — PROGRESS NOTE ADULT - PROBLEM SELECTOR PLAN 2
- pt on furosemide at home for history of cirrhosis  - hold furosemide at this time in setting of KAY on CKD w/ elevated BUN??  - continue to trend Cr and monitor UOP - pt on furosemide at home for history of cirrhosis  - per cards c/w furosemide at this time and monitor creatinine closely   - continue to trend Cr and monitor UOP

## 2020-08-20 NOTE — PHYSICAL THERAPY INITIAL EVALUATION ADULT - DISCHARGE DISPOSITION, PT EVAL
rehabilitation facility/Inpatient rehab facility to address functional deficits and independence during ADLs

## 2020-08-20 NOTE — DISCHARGE NOTE PROVIDER - HOSPITAL COURSE
80year old male is admitted for mechanical fall sustained Right hip femoral neck fracture and underwent surgery Right hip intramedullary nail 8/19/2020 without any intraoperative complications.  Patient is doing well and stable for discharge.  Patient is tolerating physical therapy: weight bearing to Right leg, out of bed for gait training, and exercises as shown by Physical Therapy.  Keep wound dressing on as is until day of office visit.  Staples/sutures if applicable, to be removed in the office 14 days from date of surgery.  Patient is on Lovenox for anticoagulation.  Orthopaedic Surgeon Dr. Foreman would like patient to call private MD/Internist for appointment postop to maintain continuity of care.  Follow up with Dr. Foreman's office in 1-2 weeks. 80year old male is admitted for mechanical fall sustained Right hip femoral neck fracture and underwent surgery Right hip intramedullary nail 8/19/2020 without any intraoperative complications.  Patient is doing well and stable for discharge.  Patient is tolerating physical therapy: weight bearing to Right leg, out of bed for gait training, and exercises as shown by Physical Therapy.  Keep wound dressing on as is until day of office visit.  Staples/sutures if applicable, to be removed in the office 14 days from date of surgery.  Patient is on Lovenox for anticoagulation.  Orthopaedic Surgeon Dr. Foreamn would like patient to call private MD/Internist for appointment postop to maintain continuity of care.  Follow up with Dr. Foreman's office in 1-2 weeks.  Patient was also seen by Cardiology Dr. Singh for perioperative optimization - ECHO: Mild pulm HTN with normal RV and LV function, ef 72%, EKG: RBBB.

## 2020-08-20 NOTE — OCCUPATIONAL THERAPY INITIAL EVALUATION ADULT - BED MOBILITY/TRANSFERS, PREVIOUS LEVEL OF FUNCTION, OT EVAL
independent/Prior to hospitalization, pt reports he was using a cane for household functional mobility and a rollator for community functional mobility.

## 2020-08-20 NOTE — PROGRESS NOTE ADULT - ASSESSMENT
80M s/p R IMN, POD#1    Plan:  -WBAT  -pain control  -incentive spirometry  -DVT ppx  -PT/OT  -f/u am labs  -monitor EKG  -dispo planning

## 2020-08-20 NOTE — PROGRESS NOTE ADULT - SUBJECTIVE AND OBJECTIVE BOX
CHIEF COMPLAINT: f/u surgery    SUBJECTIVE / OVERNIGHT EVENTS: Patient seen and examined. No acute events overnight. Pain well controlled and patient without any complaints.    MEDICATIONS  (STANDING):  acetaminophen   Tablet .. 975 milliGRAM(s) Oral every 8 hours  ascorbic acid 500 milliGRAM(s) Oral daily  calcium carbonate 1250 mG  + Vitamin D (OsCal 500 + D) 1 Tablet(s) Oral daily  chlorhexidine 2% Cloths 1 Application(s) Topical once  enoxaparin Injectable 40 milliGRAM(s) SubCutaneous daily  famotidine    Tablet 20 milliGRAM(s) Oral daily  furosemide    Tablet 20 milliGRAM(s) Oral daily  lactulose Syrup 15 Gram(s) Oral two times a day  pantoprazole    Tablet 40 milliGRAM(s) Oral daily  povidone iodine 5% Nasal Swab 1 Application(s) Both Nostrils once  propranolol 10 milliGRAM(s) Oral two times a day  senna 2 Tablet(s) Oral at bedtime  sodium chloride 0.9%. 1000 milliLiter(s) (75 mL/Hr) IV Continuous <Continuous>    MEDICATIONS  (PRN):  ondansetron Injectable 4 milliGRAM(s) IV Push every 6 hours PRN Nausea and/or Vomiting  oxyCODONE    IR 5 milliGRAM(s) Oral every 4 hours PRN Severe Pain (7 - 10)  oxyCODONE    IR 2.5 milliGRAM(s) Oral every 4 hours PRN Mild or Moderate pain    VITALS:  T(F): 98.1 (08-20-20 @ 12:35), Max: 98.7 (08-20-20 @ 09:38)  HR: 94 (08-20-20 @ 12:35) (57 - 113)  BP: 131/60 (08-20-20 @ 12:35) (85/50 - 180/72)  RR: 17 (08-20-20 @ 12:35) (8 - 22)  SpO2: 100% (08-20-20 @ 12:35)  Height (cm): 175.26 (17:26)  Weight (kg): 85 (01:55)  BMI (kg/m2): 27.7 (01:55)    PHYSICAL EXAM:  GENERAL: NAD, well-groomed, well-developed  CHEST/LUNG: Clear to percussion bilaterally; No rales, rhonchi, wheezing, or rubs  HEART: Regular rate and rhythm; No murmurs, rubs, or gallops  ABDOMEN: Soft, Nontender, Nondistended; Bowel sounds present  EXTREMITIES: R hip flexion limited by flexion/extension  SKIN: Dressing C/D/I      LABS:              11.4                 x    | x    | x            8.75  >-----------< 146     ------------------------< x                     35.8                 x    | x    | x                                            Ca x     Mg x     Ph x           TPro  8.3  /  Alb  3.9      TBili  0.7  /  DBili  x         AST  45  /  ALT  17            AlkPhos  132      INR: 1.52<H>;    PT: 17.0 SEC<H>;    PTT: 36.5 SEC<H>    CARDIAC MARKERS  x     / 103 u/L / 3.21 ng/mL    RADIOLOGY & ADDITIONAL TESTS:  Imaging Personally Reviewed: [x] Yes  < from: Xray Pelvis AP only (08.19.20 @ 14:44) >  IMPRESSION:  Redemonstrated slightly impacted right hip basicervical fracture. No dislocations or additional fractures.    Intact pelvic and obturator rings and symmetrically aligned and spaced SI joints and pubic symphysis.    Preserved bilateral hip joint spaces.    No discrete lytic or blastic lesions.    < end of copied text >    [ ] Consultant(s) Notes Reviewed:  [x] Care Discussed with Consultants/Other Providers: Orthopedic PA - discussed management of tachycardia CHIEF COMPLAINT: f/u surgery    SUBJECTIVE / OVERNIGHT EVENTS: Patient seen and examined. No acute events overnight. Pain well controlled and patient without any complaints.    MEDICATIONS  (STANDING):  acetaminophen   Tablet .. 975 milliGRAM(s) Oral every 8 hours  ascorbic acid 500 milliGRAM(s) Oral daily  calcium carbonate 1250 mG  + Vitamin D (OsCal 500 + D) 1 Tablet(s) Oral daily  chlorhexidine 2% Cloths 1 Application(s) Topical once  enoxaparin Injectable 40 milliGRAM(s) SubCutaneous daily  famotidine    Tablet 20 milliGRAM(s) Oral daily  furosemide    Tablet 20 milliGRAM(s) Oral daily  lactulose Syrup 15 Gram(s) Oral two times a day  pantoprazole    Tablet 40 milliGRAM(s) Oral daily  povidone iodine 5% Nasal Swab 1 Application(s) Both Nostrils once  propranolol 10 milliGRAM(s) Oral two times a day  senna 2 Tablet(s) Oral at bedtime  sodium chloride 0.9%. 1000 milliLiter(s) (75 mL/Hr) IV Continuous <Continuous>    MEDICATIONS  (PRN):  ondansetron Injectable 4 milliGRAM(s) IV Push every 6 hours PRN Nausea and/or Vomiting  oxyCODONE    IR 5 milliGRAM(s) Oral every 4 hours PRN Severe Pain (7 - 10)  oxyCODONE    IR 2.5 milliGRAM(s) Oral every 4 hours PRN Mild or Moderate pain    VITALS:  T(F): 98.1 (08-20-20 @ 12:35), Max: 98.7 (08-20-20 @ 09:38)  HR: 94 (08-20-20 @ 12:35) (57 - 113)  BP: 131/60 (08-20-20 @ 12:35) (85/50 - 180/72)  RR: 17 (08-20-20 @ 12:35) (8 - 22)  SpO2: 100% (08-20-20 @ 12:35)  Height (cm): 175.26 (17:26)  Weight (kg): 85 (01:55)  BMI (kg/m2): 27.7 (01:55)    PHYSICAL EXAM:  GENERAL: NAD, well-groomed, well-developed  CHEST/LUNG: Clear to percussion bilaterally; No rales, rhonchi, wheezing, or rubs  HEART: Regular rate and rhythm; No murmurs, rubs, or gallops  ABDOMEN: Soft, Nontender, Nondistended; Bowel sounds present  EXTREMITIES: R hip flexion limited by flexion/extension  SKIN: Dressing C/D/I    LABS:              11.4                 x    | x    | x            8.75  >-----------< 146     ------------------------< x                     35.8                 x    | x    | x                                            Ca x     Mg x     Ph x           TPro  8.3  /  Alb  3.9      TBili  0.7  /  DBili  x         AST  45  /  ALT  17            AlkPhos  132      INR: 1.52<H>;    PT: 17.0 SEC<H>;    PTT: 36.5 SEC<H>    CARDIAC MARKERS  x     / 103 u/L / 3.21 ng/mL    RADIOLOGY & ADDITIONAL TESTS:  Imaging Personally Reviewed: [x] Yes  < from: Xray Pelvis AP only (08.19.20 @ 14:44) >  IMPRESSION:  Redemonstrated slightly impacted right hip basicervical fracture. No dislocations or additional fractures.    Intact pelvic and obturator rings and symmetrically aligned and spaced SI joints and pubic symphysis.    Preserved bilateral hip joint spaces.    No discrete lytic or blastic lesions.    < end of copied text >    [ ] Consultant(s) Notes Reviewed:  [x] Care Discussed with Consultants/Other Providers: Orthopedic PA - discussed management of tachycardia

## 2020-08-21 DIAGNOSIS — I45.10 UNSPECIFIED RIGHT BUNDLE-BRANCH BLOCK: ICD-10-CM

## 2020-08-21 DIAGNOSIS — D64.9 ANEMIA, UNSPECIFIED: ICD-10-CM

## 2020-08-21 LAB
ANION GAP SERPL CALC-SCNC: 10 MMO/L — SIGNIFICANT CHANGE UP (ref 7–14)
BLD GP AB SCN SERPL QL: NEGATIVE — SIGNIFICANT CHANGE UP
BUN SERPL-MCNC: 36 MG/DL — HIGH (ref 7–23)
CALCIUM SERPL-MCNC: 8.2 MG/DL — LOW (ref 8.4–10.5)
CHLORIDE SERPL-SCNC: 111 MMOL/L — HIGH (ref 98–107)
CO2 SERPL-SCNC: 18 MMOL/L — LOW (ref 22–31)
CREAT SERPL-MCNC: 1.56 MG/DL — HIGH (ref 0.5–1.3)
GLUCOSE SERPL-MCNC: 132 MG/DL — HIGH (ref 70–99)
HCT VFR BLD CALC: 24 % — LOW (ref 39–50)
HCT VFR BLD CALC: 25 % — LOW (ref 39–50)
HGB BLD-MCNC: 7.7 G/DL — LOW (ref 13–17)
HGB BLD-MCNC: 8.2 G/DL — LOW (ref 13–17)
MCHC RBC-ENTMCNC: 25.8 PG — LOW (ref 27–34)
MCHC RBC-ENTMCNC: 27.1 PG — SIGNIFICANT CHANGE UP (ref 27–34)
MCHC RBC-ENTMCNC: 32.1 % — SIGNIFICANT CHANGE UP (ref 32–36)
MCHC RBC-ENTMCNC: 32.8 % — SIGNIFICANT CHANGE UP (ref 32–36)
MCV RBC AUTO: 80.3 FL — SIGNIFICANT CHANGE UP (ref 80–100)
MCV RBC AUTO: 82.5 FL — SIGNIFICANT CHANGE UP (ref 80–100)
NRBC # FLD: 0 K/UL — SIGNIFICANT CHANGE UP (ref 0–0)
NRBC # FLD: 0 K/UL — SIGNIFICANT CHANGE UP (ref 0–0)
PLATELET # BLD AUTO: 103 K/UL — LOW (ref 150–400)
PLATELET # BLD AUTO: 115 K/UL — LOW (ref 150–400)
PMV BLD: 10.7 FL — SIGNIFICANT CHANGE UP (ref 7–13)
PMV BLD: 10.8 FL — SIGNIFICANT CHANGE UP (ref 7–13)
POTASSIUM SERPL-MCNC: 4.4 MMOL/L — SIGNIFICANT CHANGE UP (ref 3.5–5.3)
POTASSIUM SERPL-SCNC: 4.4 MMOL/L — SIGNIFICANT CHANGE UP (ref 3.5–5.3)
RBC # BLD: 2.99 M/UL — LOW (ref 4.2–5.8)
RBC # BLD: 3.03 M/UL — LOW (ref 4.2–5.8)
RBC # FLD: 16.8 % — HIGH (ref 10.3–14.5)
RBC # FLD: 17 % — HIGH (ref 10.3–14.5)
RH IG SCN BLD-IMP: POSITIVE — SIGNIFICANT CHANGE UP
SODIUM SERPL-SCNC: 139 MMOL/L — SIGNIFICANT CHANGE UP (ref 135–145)
WBC # BLD: 5.8 K/UL — SIGNIFICANT CHANGE UP (ref 3.8–10.5)
WBC # BLD: 5.89 K/UL — SIGNIFICANT CHANGE UP (ref 3.8–10.5)
WBC # FLD AUTO: 5.8 K/UL — SIGNIFICANT CHANGE UP (ref 3.8–10.5)
WBC # FLD AUTO: 5.89 K/UL — SIGNIFICANT CHANGE UP (ref 3.8–10.5)

## 2020-08-21 PROCEDURE — 99233 SBSQ HOSP IP/OBS HIGH 50: CPT

## 2020-08-21 PROCEDURE — 99232 SBSQ HOSP IP/OBS MODERATE 35: CPT

## 2020-08-21 RX ORDER — OXYCODONE HYDROCHLORIDE 5 MG/1
1 TABLET ORAL
Qty: 0 | Refills: 0 | DISCHARGE
Start: 2020-08-21

## 2020-08-21 RX ORDER — PANTOPRAZOLE SODIUM 20 MG/1
1 TABLET, DELAYED RELEASE ORAL
Qty: 0 | Refills: 0 | DISCHARGE
Start: 2020-08-21

## 2020-08-21 RX ORDER — ACETAMINOPHEN 500 MG
3 TABLET ORAL
Qty: 0 | Refills: 0 | DISCHARGE
Start: 2020-08-21

## 2020-08-21 RX ORDER — ASCORBIC ACID 60 MG
1 TABLET,CHEWABLE ORAL
Qty: 0 | Refills: 0 | DISCHARGE
Start: 2020-08-21

## 2020-08-21 RX ORDER — OXYCODONE HYDROCHLORIDE 5 MG/1
2.5 TABLET ORAL
Qty: 0 | Refills: 0 | DISCHARGE
Start: 2020-08-21

## 2020-08-21 RX ORDER — ENOXAPARIN SODIUM 100 MG/ML
40 INJECTION SUBCUTANEOUS
Qty: 0 | Refills: 0 | DISCHARGE
Start: 2020-08-21

## 2020-08-21 RX ORDER — SENNA PLUS 8.6 MG/1
2 TABLET ORAL
Qty: 0 | Refills: 0 | DISCHARGE
Start: 2020-08-21

## 2020-08-21 RX ADMIN — SODIUM CHLORIDE 75 MILLILITER(S): 9 INJECTION INTRAMUSCULAR; INTRAVENOUS; SUBCUTANEOUS at 18:49

## 2020-08-21 RX ADMIN — OXYCODONE HYDROCHLORIDE 5 MILLIGRAM(S): 5 TABLET ORAL at 09:00

## 2020-08-21 RX ADMIN — OXYCODONE HYDROCHLORIDE 5 MILLIGRAM(S): 5 TABLET ORAL at 13:29

## 2020-08-21 RX ADMIN — Medication 20 MILLIGRAM(S): at 06:00

## 2020-08-21 RX ADMIN — Medication 1 TABLET(S): at 13:31

## 2020-08-21 RX ADMIN — LACTULOSE 15 GRAM(S): 10 SOLUTION ORAL at 05:59

## 2020-08-21 RX ADMIN — OXYCODONE HYDROCHLORIDE 5 MILLIGRAM(S): 5 TABLET ORAL at 08:40

## 2020-08-21 RX ADMIN — ENOXAPARIN SODIUM 40 MILLIGRAM(S): 100 INJECTION SUBCUTANEOUS at 13:30

## 2020-08-21 RX ADMIN — Medication 975 MILLIGRAM(S): at 18:49

## 2020-08-21 RX ADMIN — Medication 975 MILLIGRAM(S): at 08:38

## 2020-08-21 RX ADMIN — Medication 500 MILLIGRAM(S): at 13:31

## 2020-08-21 RX ADMIN — Medication 975 MILLIGRAM(S): at 09:00

## 2020-08-21 RX ADMIN — PANTOPRAZOLE SODIUM 40 MILLIGRAM(S): 20 TABLET, DELAYED RELEASE ORAL at 13:31

## 2020-08-21 NOTE — PROGRESS NOTE ADULT - SUBJECTIVE AND OBJECTIVE BOX
Subjective: Patient seen and examined. No new events except as noted.     SUBJECTIVE/ROS:  nAD      MEDICATIONS:  MEDICATIONS  (STANDING):  acetaminophen   Tablet .. 975 milliGRAM(s) Oral every 8 hours  ascorbic acid 500 milliGRAM(s) Oral daily  calcium carbonate 1250 mG  + Vitamin D (OsCal 500 + D) 1 Tablet(s) Oral daily  chlorhexidine 2% Cloths 1 Application(s) Topical once  enoxaparin Injectable 40 milliGRAM(s) SubCutaneous daily  famotidine    Tablet 20 milliGRAM(s) Oral daily  furosemide    Tablet 20 milliGRAM(s) Oral daily  lactulose Syrup 15 Gram(s) Oral two times a day  pantoprazole    Tablet 40 milliGRAM(s) Oral daily  povidone iodine 5% Nasal Swab 1 Application(s) Both Nostrils once  propranolol 10 milliGRAM(s) Oral two times a day  senna 2 Tablet(s) Oral at bedtime  sodium chloride 0.9%. 1000 milliLiter(s) (75 mL/Hr) IV Continuous <Continuous>      PHYSICAL EXAM:  T(C): 36.7 (08-21-20 @ 00:30), Max: 37.1 (08-20-20 @ 09:38)  HR: 73 (08-21-20 @ 00:30) (73 - 101)  BP: 115/52 (08-21-20 @ 00:30) (85/50 - 131/60)  RR: 18 (08-21-20 @ 00:30) (16 - 18)  SpO2: 99% (08-21-20 @ 00:30) (99% - 100%)  Wt(kg): --  I&O's Summary    19 Aug 2020 07:01  -  20 Aug 2020 07:00  --------------------------------------------------------  IN: 1380 mL / OUT: 200 mL / NET: 1180 mL    20 Aug 2020 07:01  -  21 Aug 2020 05:22  --------------------------------------------------------  IN: 2150 mL / OUT: 1551 mL / NET: 599 mL            JVP: Normal  Neck: supple  Lung: clear   CV: S1 S2 , Murmur:  Abd: soft  Ext: No edema  neuro: Awake / alert  Psych: flat affect  Skin: normal``    LABS/DATA:    CARDIAC MARKERS:  CARDIAC MARKERS ( 19 Aug 2020 21:37 )  x     / x     / 103 u/L / 3.21 ng/mL / x                                    11.4   8.75  )-----------( 146      ( 20 Aug 2020 05:27 )             35.8     08-20    136  |  104  |  37<H>  ----------------------------<  146<H>  4.8   |  14<L>  |  1.50<H>    Ca    8.9      20 Aug 2020 05:21  Phos  2.7     08-19  Mg     1.7     08-19    TPro  8.3  /  Alb  3.9  /  TBili  0.7  /  DBili  x   /  AST  45<H>  /  ALT  17  /  AlkPhos  132<H>  08-19    proBNP:   Lipid Profile:   HgA1c:   TSH:     TELE:  EKG:      < from: TTE with Doppler (w/Cont) (08.20.20 @ 11:37) >  ------------------------------------------------------------------------  CONCLUSIONS:  1. Calcified trileaflet aortic valve with grossly  moderately decreased opening. Peak transaortic valve  gradient equals 15 mm Hg, mean transaortic valve gradient  equals 10 mm Hg. Minimal aortic regurgitation.  2. Normal left ventricular internal dimensions and wall  thicknesses.  3. Endocardium not well visualized; grossly normal left  ventricular systolic function. Endocardial visualization  enhanced with intravenous injection of echo contrast  (Definity).  4. Normal right ventricular size and function.  5. Estimated pulmonary artery systolic pressure equals 44  mm Hg, assuming right atrial pressure equals 10  mm Hg,  consistent with mild pulmonary hypertension.  ------------------------------------------------------------------------  Confirmed on  8/20/2020 - 13:07:00 by Grayson Cadena M.D. [facility]  ------------------------------------------------------------------------    < end of copied text >

## 2020-08-21 NOTE — PROGRESS NOTE ADULT - SUBJECTIVE AND OBJECTIVE BOX
Patient is a 80y old  Male who presents with a chief complaint of right hip pain (21 Aug 2020 09:14)    Interval history: patient reports R hip pain 5/10, improves with pain medication. Patient reports bm today.    REVIEW OF SYSTEMS  Constitutional - No fever, No weight loss, No fatigue  HEENT - No eye pain, No visual disturbances, No difficulty hearing, No tinnitus, No vertigo, No neck pain  Respiratory - No cough, No wheezing, No shortness of breath  Cardiovascular - No chest pain, No palpitations  Gastrointestinal - No abdominal pain, No nausea, No vomiting, No diarrhea, No constipation  Genitourinary - No dysuria, No frequency, No hematuria, No incontinence  Neurological - No headaches, No memory loss, + loss of strength, No numbness, No tremors  Skin - No itching, No rashes, No lesions   Endocrine - No temperature intolerance  Musculoskeletal - + joint pain  Psychiatric - No depression, No anxiety      PAST MEDICAL & SURGICAL HISTORY  Porcelain gallbladder  Liver cirrhosis  Guillain-[place] syndrome  HTN (hypertension)  H/O inguinal hernia repair      CURRENT FUNCTIONAL STATUS  BM: contact guard to go from laying to sitting at edge of bed      FAMILY HISTORY   Family history of ovarian cancer (Sibling)      RECENT LABS/IMAGING  < from: TTE with Doppler (w/Cont) (08.20.20 @ 11:37) >  Patient name: KETAN TIPTON  YOB: 1940   Age: 80 (M)   MR#: [id number]  Study Date: 8/20/2020  Location: [id number]: Zoie Belcher RDCS  Study quality: Technically good  Referring Physician: Trevor Foreman MD  Blood Pressure: 85/56 mmHg  Height: 175 cm  Weight: 85 kg  BSA: 2 m2  ------------------------------------------------------------------------  PROCEDURE: Transthoracic echocardiogram with 2-D, M-Mode  and complete spectral and color flow Doppler.  Intravenous ultrasound enhancing agent was administered for  improved left ventricular endocardial border definition.  Following the intravenous injection of ultrasound enhancing  agent, harmonic imaging was performed.LOT#[id number].  INDICATION: Abnormal electrocardiogram (ECG) (EKG) (R94.31)  ------------------------------------------------------------------------  DIMENSIONS:  Dimensions:     Normal Values:  LA:     3.5 cm    2.0 - 4.0 cm  Ao:     3.9 cm    2.0 - 3.8 cm  SEPTUM: 0.8 cm    0.6 - 1.2 cm  PWT:    0.8cm    0.6 - 1.1 cm  LVIDd:  4.4 cm    3.0 - 5.6 cm  LVIDs:  2.6 cm    1.8 - 4.0 cm  Derived Variables:  LVMI: 54 g/m2  RWT: 0.36  Fractional short: 41 %  Ejection Fraction (Teicholtz): 72 %  ------------------------------------------------------------------------  OBSERVATIONS:  Mitral Valve: Normal mitral valve.  Aortic Root: Normal size aortic root. (Ao:3.9 cm).  Mild  ascending aorta dilatation (4.2 cm).  Aortic Valve: Calcified trileaflet aortic valve with  grossly moderately decreased opening. Peak transaortic  valve gradient equals 15 mm Hg, mean transaortic valve  gradient equals 10 mm Hg. Minimal aortic regurgitation.  Left Atrium: Normal left atrium.  LA volume index = 13  cc/m2.  Left Ventricle: Endocardium not well visualized; grossly  normal left ventricular systolic function. Endocardial  visualization enhanced with intravenous injection of echo  contrast (Definity). Normal left ventricular internal  dimensions and wall thicknesses.  Right Heart: Normal right atrium. Normal right ventricular  size and function. Normal tricuspid valve. Minimal  tricuspid regurgitation. Normal pulmonic valve.  Pericardium/PleuraNormal pericardium with no pericardial  effusion.  Hemodynamic: Estimated right ventricular systolic pressure  equals 44 mm Hg, assuming right atrial pressure equals 10  mm Hg, consistent with mild pulmonary hypertension.  ------------------------------------------------------------------------  CONCLUSIONS:  1. Calcified trileaflet aortic valve with grossly  moderately decreased opening. Peak transaortic valve  gradient equals 15 mm Hg, mean transaortic valve gradient  equals 10 mm Hg. Minimal aortic regurgitation.  2. Normal left ventricular internal dimensions and wall  thicknesses.  3. Endocardium not well visualized; grossly normal left  ventricular systolic function. Endocardial visualization  enhanced with intravenous injection of echo contrast  (Definity).  4. Normal right ventricular size and function.  5. Estimated pulmonary artery systolic pressure equals 44  mm Hg, assuming right atrial pressure equals 10  mm Hg,  consistent with mild pulmonary hypertension.  ------------------------------------------------------------------------  Confirmed on  8/20/2020 - 13:07:00 by Grayson Cadena M.D. RPVI  ------------------------------------------------------------------------    < end of copied text >    < from: Xray Hip 2-3 Views, Right (08.19.20 @ 14:44) >    EXAM:  XR HIP 2-3V RT      EXAM:  RAD PELVIS AP ONLY        PROCEDURE DATE:  Aug 19 2020       INTERPRETATION:  CLINICAL INDICATION: right hip fracture    EXAM:  AP pelvis and separate dedicated AP right hip from 8/19/2020 at 1444. Reviewed in conjunction with radiographs from earlier the same day.    IMPRESSION:  Redemonstrated slightly impacted right hip basicervical fracture. No dislocations or additional fractures.    Intact pelvic and obturator rings and symmetrically aligned and spaced SI joints and pubic symphysis.    Preserved bilateral hip joint spaces.    No discrete lytic or blastic lesions.      < end of copied text >      < from: Xray Chest 1 View AP/PA (08.19.20 @ 11:57) >    EXAM:  XR CHEST AP OR PA 1V        PROCEDURE DATE:  Aug 19 2020         INTERPRETATION:  CLINICAL INDICATION: right hip fracture; admission    EXAM:  Single frontal chest from 8/19/2020 at 1157. Compared to prior study from 4/26/2020.    Projectionlordotic.    IMPRESSION:  Clear lungs. No pleural effusions or pneumothorax.    Heart size and mediastinal width inaccurately assessed on the projection but appear grossly stable.    Trachea midline.    Generalized osteopenia. No acute or focally aggressive appearing osseous abnormalities.    < end of copied text >    CBC Full  -  ( 21 Aug 2020 08:10 )  WBC Count : 5.89 K/uL  RBC Count : 3.03 M/uL  Hemoglobin : 8.2 g/dL  Hematocrit : 25.0 %  Platelet Count - Automated : 115 K/uL  Mean Cell Volume : 82.5 fL  Mean Cell Hemoglobin : 27.1 pg  Mean Cell Hemoglobin Concentration : 32.8 %  Auto Neutrophil # : x  Auto Lymphocyte # : x  Auto Monocyte # : x  Auto Eosinophil # : x  Auto Basophil # : x  Auto Neutrophil % : x  Auto Lymphocyte % : x  Auto Monocyte % : x  Auto Eosinophil % : x  Auto Basophil % : x    08-21    139  |  111<H>  |  36<H>  ----------------------------<  132<H>  4.4   |  18<L>  |  1.56<H>    Ca    8.2<L>      21 Aug 2020 05:44  Phos  2.7     08-19  Mg     1.7     08-19    TPro  8.3  /  Alb  3.9  /  TBili  0.7  /  DBili  x   /  AST  45<H>  /  ALT  17  /  AlkPhos  132<H>  08-19        VITALS  T(C): 36.1 (08-21-20 @ 08:34), Max: 36.8 (08-20-20 @ 20:53)  HR: 62 (08-21-20 @ 08:34) (62 - 94)  BP: 113/60 (08-21-20 @ 08:34) (100/40 - 133/57)  RR: 16 (08-21-20 @ 08:34) (16 - 18)  SpO2: 99% (08-21-20 @ 08:34) (99% - 100%)  Wt(kg): --    ALLERGIES  No Known Allergies      MEDICATIONS   acetaminophen   Tablet .. 975 milliGRAM(s) Oral every 8 hours  ascorbic acid 500 milliGRAM(s) Oral daily  calcium carbonate 1250 mG  + Vitamin D (OsCal 500 + D) 1 Tablet(s) Oral daily  chlorhexidine 2% Cloths 1 Application(s) Topical once  enoxaparin Injectable 40 milliGRAM(s) SubCutaneous daily  famotidine    Tablet 20 milliGRAM(s) Oral daily  furosemide    Tablet 20 milliGRAM(s) Oral daily  lactulose Syrup 15 Gram(s) Oral two times a day  ondansetron Injectable 4 milliGRAM(s) IV Push every 6 hours PRN  oxyCODONE    IR 5 milliGRAM(s) Oral every 4 hours PRN  oxyCODONE    IR 2.5 milliGRAM(s) Oral every 4 hours PRN  pantoprazole    Tablet 40 milliGRAM(s) Oral daily  povidone iodine 5% Nasal Swab 1 Application(s) Both Nostrils once  propranolol 10 milliGRAM(s) Oral two times a day  senna 2 Tablet(s) Oral at bedtime  sodium chloride 0.9%. 1000 milliLiter(s) IV Continuous <Continuous>      ----------------------------------------------------------------------------------------  PHYSICAL EXAM  Constitutional - NAD, Comfortable  HEENT - NCAT   Neck - Supple, No limited ROM  Chest - no respiratory distress  Cardiovascular - no edema  Abdomen - Soft, NTND  Extremities - No C/C/E, No calf tenderness. dressing in place R hip with small amount of dried on gauze.  mild bruising.  Neurologic Exam -                    Cognitive - Awake, Alert, AAO to self, place, date, year, (however reports he had surgery in the left hip instead of right)     Communication - Fluent, No dysarthria     Cranial Nerves - CN 2-12 intact     Motor -                     LEFT    UE - ShAB 5/5, EF 5/5, EE 5/5, WE 5/5,  5/5                    RIGHT UE - ShAB 5/5, EF 5/5, EE 5/5, WE 5/5,  5/5                    LEFT    LE - HF 4+/5, KE 4+/5, DF 5/5, PF 5/5                    RIGHT LE - HF 1/5, KE 2/5, DF 5/5, PF 5/5        Sensory - Intact to LT     OculoVestibular - No saccades, No nystagmus, VOR         Balance - WNL Static  Psychiatric - Mood stable, Affect WNL  ----------------------------------------------------------------------------------------  ASSESSMENT/PLAN  81 yo m pmh HTN, CKD, distant GBS, alcoholic cirrhosis , grade 1 esophageal varices now s/p IM nail right hip 8/19 for R femoral neck basicervical fracture   WBAT  anemia: possibly dilutional,  transfuse for Hg <7,  asymptomatic.  Hg 8.2 today  Pain -tylenol prn, oxy prn, with bowel regimen  DVT PPX - lovenox  bedside PT/OT for bed mobility, transfers, ambulation w ad, ADLs  Diet: regular  Rehab -     Recommend ACUTE inpatient rehabilitation for the functional deficits consisting of 3 hours of therapy/day & 24 hour RN/daily PMR physician for comorbid medical management. Will continue to follow for ongoing rehab needs and recommendations. Patient is a 80y old  Male who presents with a chief complaint of right hip pain (21 Aug 2020 09:14)    Interval history: patient reports R hip pain 5/10, improves with pain medication. Patient reports bm today.    REVIEW OF SYSTEMS  Constitutional - No fever, No weight loss, No fatigue  HEENT - No eye pain, No visual disturbances, No difficulty hearing, No tinnitus, No vertigo, No neck pain  Respiratory - No cough, No wheezing, No shortness of breath  Cardiovascular - No chest pain, No palpitations  Gastrointestinal - No abdominal pain, No nausea, No vomiting, No diarrhea, No constipation  Genitourinary - No dysuria, No frequency, No hematuria, No incontinence  Neurological - No headaches, No memory loss, + loss of strength, No numbness, No tremors  Skin - No itching, No rashes, No lesions   Endocrine - No temperature intolerance  Musculoskeletal - + joint pain  Psychiatric - No depression, No anxiety      PAST MEDICAL & SURGICAL HISTORY  Porcelain gallbladder  Liver cirrhosis  Guillain-[place] syndrome  HTN (hypertension)  H/O inguinal hernia repair      CURRENT FUNCTIONAL STATUS   PT 8/21  Pt performed bed mobility with moderate assist x1. Pt ambulated ~5 feet with rolling walker and moderate assist x1.      FAMILY HISTORY   Family history of ovarian cancer (Sibling)      RECENT LABS/IMAGING  < from: TTE with Doppler (w/Cont) (08.20.20 @ 11:37) >  Patient name: KETAN TIPTON  YOB: 1940   Age: 80 (M)   MR#: [id number]  Study Date: 8/20/2020  Location: [id number]: Zoie Belcher RDCS  Study quality: Technically good  Referring Physician: Trevor Foreman MD  Blood Pressure: 85/56 mmHg  Height: 175 cm  Weight: 85 kg  BSA: 2 m2  ------------------------------------------------------------------------  PROCEDURE: Transthoracic echocardiogram with 2-D, M-Mode  and complete spectral and color flow Doppler.  Intravenous ultrasound enhancing agent was administered for  improved left ventricular endocardial border definition.  Following the intravenous injection of ultrasound enhancing  agent, harmonic imaging was performed.LOT#[id number].  INDICATION: Abnormal electrocardiogram (ECG) (EKG) (R94.31)  ------------------------------------------------------------------------  DIMENSIONS:  Dimensions:     Normal Values:  LA:     3.5 cm    2.0 - 4.0 cm  Ao:     3.9 cm    2.0 - 3.8 cm  SEPTUM: 0.8 cm    0.6 - 1.2 cm  PWT:    0.8cm    0.6 - 1.1 cm  LVIDd:  4.4 cm    3.0 - 5.6 cm  LVIDs:  2.6 cm    1.8 - 4.0 cm  Derived Variables:  LVMI: 54 g/m2  RWT: 0.36  Fractional short: 41 %  Ejection Fraction (Teicholtz): 72 %  ------------------------------------------------------------------------  OBSERVATIONS:  Mitral Valve: Normal mitral valve.  Aortic Root: Normal size aortic root. (Ao:3.9 cm).  Mild  ascending aorta dilatation (4.2 cm).  Aortic Valve: Calcified trileaflet aortic valve with  grossly moderately decreased opening. Peak transaortic  valve gradient equals 15 mm Hg, mean transaortic valve  gradient equals 10 mm Hg. Minimal aortic regurgitation.  Left Atrium: Normal left atrium.  LA volume index = 13  cc/m2.  Left Ventricle: Endocardium not well visualized; grossly  normal left ventricular systolic function. Endocardial  visualization enhanced with intravenous injection of echo  contrast (Definity). Normal left ventricular internal  dimensions and wall thicknesses.  Right Heart: Normal right atrium. Normal right ventricular  size and function. Normal tricuspid valve. Minimal  tricuspid regurgitation. Normal pulmonic valve.  Pericardium/PleuraNormal pericardium with no pericardial  effusion.  Hemodynamic: Estimated right ventricular systolic pressure  equals 44 mm Hg, assuming right atrial pressure equals 10  mm Hg, consistent with mild pulmonary hypertension.  ------------------------------------------------------------------------  CONCLUSIONS:  1. Calcified trileaflet aortic valve with grossly  moderately decreased opening. Peak transaortic valve  gradient equals 15 mm Hg, mean transaortic valve gradient  equals 10 mm Hg. Minimal aortic regurgitation.  2. Normal left ventricular internal dimensions and wall  thicknesses.  3. Endocardium not well visualized; grossly normal left  ventricular systolic function. Endocardial visualization  enhanced with intravenous injection of echo contrast  (Definity).  4. Normal right ventricular size and function.  5. Estimated pulmonary artery systolic pressure equals 44  mm Hg, assuming right atrial pressure equals 10  mm Hg,  consistent with mild pulmonary hypertension.  ------------------------------------------------------------------------  Confirmed on  8/20/2020 - 13:07:00 by Graysno Cadena M.D. RPVI  ------------------------------------------------------------------------    < end of copied text >    < from: Xray Hip 2-3 Views, Right (08.19.20 @ 14:44) >    EXAM:  XR HIP 2-3V RT      EXAM:  RAD PELVIS AP ONLY        PROCEDURE DATE:  Aug 19 2020       INTERPRETATION:  CLINICAL INDICATION: right hip fracture    EXAM:  AP pelvis and separate dedicated AP right hip from 8/19/2020 at 1444. Reviewed in conjunction with radiographs from earlier the same day.    IMPRESSION:  Redemonstrated slightly impacted right hip basicervical fracture. No dislocations or additional fractures.    Intact pelvic and obturator rings and symmetrically aligned and spaced SI joints and pubic symphysis.    Preserved bilateral hip joint spaces.    No discrete lytic or blastic lesions.      < end of copied text >      < from: Xray Chest 1 View AP/PA (08.19.20 @ 11:57) >    EXAM:  XR CHEST AP OR PA 1V        PROCEDURE DATE:  Aug 19 2020         INTERPRETATION:  CLINICAL INDICATION: right hip fracture; admission    EXAM:  Single frontal chest from 8/19/2020 at 1157. Compared to prior study from 4/26/2020.    Projectionlordotic.    IMPRESSION:  Clear lungs. No pleural effusions or pneumothorax.    Heart size and mediastinal width inaccurately assessed on the projection but appear grossly stable.    Trachea midline.    Generalized osteopenia. No acute or focally aggressive appearing osseous abnormalities.    < end of copied text >    CBC Full  -  ( 21 Aug 2020 08:10 )  WBC Count : 5.89 K/uL  RBC Count : 3.03 M/uL  Hemoglobin : 8.2 g/dL  Hematocrit : 25.0 %  Platelet Count - Automated : 115 K/uL  Mean Cell Volume : 82.5 fL  Mean Cell Hemoglobin : 27.1 pg  Mean Cell Hemoglobin Concentration : 32.8 %  Auto Neutrophil # : x  Auto Lymphocyte # : x  Auto Monocyte # : x  Auto Eosinophil # : x  Auto Basophil # : x  Auto Neutrophil % : x  Auto Lymphocyte % : x  Auto Monocyte % : x  Auto Eosinophil % : x  Auto Basophil % : x    08-21    139  |  111<H>  |  36<H>  ----------------------------<  132<H>  4.4   |  18<L>  |  1.56<H>    Ca    8.2<L>      21 Aug 2020 05:44  Phos  2.7     08-19  Mg     1.7     08-19    TPro  8.3  /  Alb  3.9  /  TBili  0.7  /  DBili  x   /  AST  45<H>  /  ALT  17  /  AlkPhos  132<H>  08-19        VITALS  T(C): 36.1 (08-21-20 @ 08:34), Max: 36.8 (08-20-20 @ 20:53)  HR: 62 (08-21-20 @ 08:34) (62 - 94)  BP: 113/60 (08-21-20 @ 08:34) (100/40 - 133/57)  RR: 16 (08-21-20 @ 08:34) (16 - 18)  SpO2: 99% (08-21-20 @ 08:34) (99% - 100%)  Wt(kg): --    ALLERGIES  No Known Allergies      MEDICATIONS   acetaminophen   Tablet .. 975 milliGRAM(s) Oral every 8 hours  ascorbic acid 500 milliGRAM(s) Oral daily  calcium carbonate 1250 mG  + Vitamin D (OsCal 500 + D) 1 Tablet(s) Oral daily  chlorhexidine 2% Cloths 1 Application(s) Topical once  enoxaparin Injectable 40 milliGRAM(s) SubCutaneous daily  famotidine    Tablet 20 milliGRAM(s) Oral daily  furosemide    Tablet 20 milliGRAM(s) Oral daily  lactulose Syrup 15 Gram(s) Oral two times a day  ondansetron Injectable 4 milliGRAM(s) IV Push every 6 hours PRN  oxyCODONE    IR 5 milliGRAM(s) Oral every 4 hours PRN  oxyCODONE    IR 2.5 milliGRAM(s) Oral every 4 hours PRN  pantoprazole    Tablet 40 milliGRAM(s) Oral daily  povidone iodine 5% Nasal Swab 1 Application(s) Both Nostrils once  propranolol 10 milliGRAM(s) Oral two times a day  senna 2 Tablet(s) Oral at bedtime  sodium chloride 0.9%. 1000 milliLiter(s) IV Continuous <Continuous>      ----------------------------------------------------------------------------------------  PHYSICAL EXAM  Constitutional - NAD, Comfortable  HEENT - NCAT   Neck - Supple, No limited ROM  Chest - no respiratory distress  Cardiovascular - no edema  Abdomen - Soft, NTND  Extremities - No C/C/E, No calf tenderness. dressing in place R hip with small amount of dried on gauze.  mild bruising.  Neurologic Exam -                    Cognitive - Awake, Alert, AAO to self, place, date, year, (however reports he had surgery in the left hip instead of right)     Communication - Fluent, No dysarthria     Cranial Nerves - CN 2-12 intact     Motor -                     LEFT    UE - ShAB 5/5, EF 5/5, EE 5/5, WE 5/5,  5/5                    RIGHT UE - ShAB 5/5, EF 5/5, EE 5/5, WE 5/5,  5/5                    LEFT    LE - HF 4+/5, KE 4+/5, DF 5/5, PF 5/5                    RIGHT LE - HF 1/5, KE 2/5, DF 5/5, PF 5/5        Sensory - Intact to LT     OculoVestibular - No saccades, No nystagmus, VOR         Balance - WNL Static  Psychiatric - Mood stable, Affect WNL  ----------------------------------------------------------------------------------------  ASSESSMENT/PLAN  81 yo m pmh HTN, CKD, distant GBS, alcoholic cirrhosis , grade 1 esophageal varices now s/p IM nail right hip 8/19 for R femoral neck basicervical fracture   WBAT  anemia: possibly dilutional,  transfuse for Hg <7,  asymptomatic.  Hg 8.2 today  Pain -tylenol prn, oxy prn, with bowel regimen  DVT PPX - lovenox  bedside PT/OT for bed mobility, transfers, ambulation w ad, ADLs  Diet: regular  Rehab -     Recommend ACUTE inpatient rehabilitation for the functional deficits consisting of 3 hours of therapy/day & 24 hour RN/daily PMR physician for comorbid medical management. Will continue to follow for ongoing rehab needs and recommendations.

## 2020-08-21 NOTE — PROGRESS NOTE ADULT - ASSESSMENT
Abnormal EKG  RBBB  QR pattern in precordial leads , echo reviewed  pos pulm HTN but normal RV function      HIP fx  s/p IMN   pain control   dvt proph     DVT proph  on lovenox     ?afib  NO evidence of afib on tele or ekgs

## 2020-08-21 NOTE — PROGRESS NOTE ADULT - PROBLEM SELECTOR PLAN 5
- alcholic liver cirrhosis w/ history of Grade 1 esophageal varices s/p banding  - resume propanalol with hold parameters for HR < 60  - c/w home lactulose, protonix and lasix - c/w propanolol, BP at goal

## 2020-08-21 NOTE — PROGRESS NOTE ADULT - PROBLEM SELECTOR PLAN 4
- c/w propanolol, BP at goal - pt on furosemide at home for history of cirrhosis  - per cards c/w furosemide at this time and monitor creatinine closely, creatinine stable

## 2020-08-21 NOTE — PROGRESS NOTE ADULT - PROBLEM SELECTOR PLAN 3
- pt on furosemide at home for history of cirrhosis  - per cards c/w furosemide at this time and monitor creatinine closely, creatinine stable Patient w/ RBBB on EKG, QR pattern in precordial leads cardiology recommending echocardiogram to rule out RHF  echocardiogram w/ normal right ventricular fxn, mild phtn PASP 44; no intervention required

## 2020-08-21 NOTE — PROGRESS NOTE ADULT - PROBLEM SELECTOR PLAN 6
-continue lovenox daily - alcholic liver cirrhosis w/ history of Grade 1 esophageal varices s/p banding  - resume propanalol with hold parameters for HR < 60  - c/w home lactulose, protonix and lasix

## 2020-08-21 NOTE — PROGRESS NOTE ADULT - PROBLEM SELECTOR PLAN 2
Patient hemoglobin 14 from admission date, repeat this morning 7.7  - repeat CBC this AM 7.7->8.2  -  patient POD 2 R basicervical FN fracture s/p repair, perioperative losses?; patient hemodynamically stable   - if hemoglobin <7 transfuse, maintain active type and screen   - would monitor BM patient w/ history of esophageal varices in the past, reports no melena or hematochezia   - monitor BP   - likely multifactorial including dilutional; patient has been on IVF since 8/19 all cell lines down trending   #Thrombocytopenia  - likely dilutional all cell lines downtrending; if noted w/ active bleeding transfuse if platelets <50K, or no active bleeding and platelets <10K  - daily CBC

## 2020-08-21 NOTE — PROGRESS NOTE ADULT - ASSESSMENT
80M h/o HTN, CKD3, GERD with Dyspepsia/H. Pylori (10/2019), distant GBS, Decompensated Alcoholic Cirrhosis (now sober, off transplant list at [facility] as improved, w/ Small Grade 1 Esophogeal Varices p/w mechanical fall found to have R basicervical FN fracture s/p repair.

## 2020-08-21 NOTE — PROGRESS NOTE ADULT - SUBJECTIVE AND OBJECTIVE BOX
rtho Post-Op Progress Note    80M s/p R IMN, POD#2  Patient is seen and examined at bedside; no acute events overnight. Pain is well controlled. Denies CP, SOB, or dizziness.     Vital Signs Last 24 Hrs  T(C): 36.6 (21 Aug 2020 05:56), Max: 37.1 (20 Aug 2020 09:38)  T(F): 97.8 (21 Aug 2020 05:56), Max: 98.7 (20 Aug 2020 09:38)  HR: 66 (21 Aug 2020 05:56) (66 - 101)  BP: 133/57 (21 Aug 2020 05:56) (85/50 - 133/57)  RR: 16 (21 Aug 2020 05:56) (16 - 18)  SpO2: 99% (21 Aug 2020 05:56) (99% - 100%)    PHYSICAL EXAM:  General: Sitting up in bed, NAD  RLE: Proximal and distal dressing lightly stained with serosanguinous drainage. Motor intact + EHL/FHL/TA/GS.  Sensation is grossly intact.  Extremity warm, compartments soft, compressible. No calf tenderness. DP 2+   LLE: Motor intact +EHL/FHL/TA/GS. Sensation is grossly intact. Extremity warm, compartments soft, compressible. No calf tenderness. DP2+    Labs               11.4   8.75  )-----------( 146      ( 20 Aug 2020 05:27 )             35.8   08-20    136  |  104  |  37<H>  ----------------------------<  146<H>  4.8   |  14<L>  |  1.50<H>    Ca    8.9      20 Aug 2020 05:21  Phos  2.7     08-19  Mg     1.7     08-19    TPro  8.3  /  Alb  3.9  /  TBili  0.7  /  DBili  x   /  AST  45<H>  /  ALT  17  /  AlkPhos  132<H>  08-19

## 2020-08-21 NOTE — PROGRESS NOTE ADULT - SUBJECTIVE AND OBJECTIVE BOX
SUBJECTIVE / OVERNIGHT EVENTS: Patient seen and examined. No acute events overnight. Pain well controlled and patient without any complaints.    MEDICATIONS  (STANDING):  acetaminophen   Tablet .. 975 milliGRAM(s) Oral every 8 hours  ascorbic acid 500 milliGRAM(s) Oral daily  calcium carbonate 1250 mG  + Vitamin D (OsCal 500 + D) 1 Tablet(s) Oral daily  chlorhexidine 2% Cloths 1 Application(s) Topical once  enoxaparin Injectable 40 milliGRAM(s) SubCutaneous daily  famotidine    Tablet 20 milliGRAM(s) Oral daily  furosemide    Tablet 20 milliGRAM(s) Oral daily  lactulose Syrup 15 Gram(s) Oral two times a day  pantoprazole    Tablet 40 milliGRAM(s) Oral daily  povidone iodine 5% Nasal Swab 1 Application(s) Both Nostrils once  propranolol 10 milliGRAM(s) Oral two times a day  senna 2 Tablet(s) Oral at bedtime  sodium chloride 0.9%. 1000 milliLiter(s) (75 mL/Hr) IV Continuous <Continuous>    MEDICATIONS  (PRN):  ondansetron Injectable 4 milliGRAM(s) IV Push every 6 hours PRN Nausea and/or Vomiting  oxyCODONE    IR 5 milliGRAM(s) Oral every 4 hours PRN Severe Pain (7 - 10)  oxyCODONE    IR 2.5 milliGRAM(s) Oral every 4 hours PRN Mild or Moderate pain    Vital Signs Last 24 Hrs  T(C): 36.1 (21 Aug 2020 08:34), Max: 37.1 (20 Aug 2020 09:38)  T(F): 96.9 (21 Aug 2020 08:34), Max: 98.7 (20 Aug 2020 09:38)  HR: 62 (21 Aug 2020 08:34) (62 - 101)  BP: 113/60 (21 Aug 2020 08:34) (85/50 - 133/57)  BP(mean): --  RR: 16 (21 Aug 2020 08:34) (16 - 18)  SpO2: 99% (21 Aug 2020 08:34) (99% - 100%)    PHYSICAL EXAM:  GENERAL: NAD, well-groomed, well-developed  CHEST/LUNG: Clear to percussion bilaterally; No rales, rhonchi, wheezing, or rubs  HEART: Regular rate and rhythm; No murmurs, rubs, or gallops  ABDOMEN: Soft, Nontender, Nondistended; Bowel sounds present  EXTREMITIES: R hip flexion limited by flexion/extension  SKIN: Dressing C/D/I    LABS:                         8.2    5.89  )-----------( 115      ( 21 Aug 2020 08:10 )             25.0     08-21    139  |  111<H>  |  36<H>  ----------------------------<  132<H>  4.4   |  18<L>  |  1.56<H>    Ca    8.2<L>      21 Aug 2020 05:44  Phos  2.7     08-19  Mg     1.7     08-19    TPro  8.3  /  Alb  3.9  /  TBili  0.7  /  DBili  x   /  AST  45<H>  /  ALT  17  /  AlkPhos  132<H>  08-19    PT/INR - ( 19 Aug 2020 11:30 )   PT: 17.0 SEC;   INR: 1.52          PTT - ( 19 Aug 2020 11:30 )  PTT:36.5 SEC    CARDIAC MARKERS ( 19 Aug 2020 21:37 )  x     / x     / 103 u/L / 3.21 ng/mL / x        X ray pelvis   Redemonstrated slightly impacted right hip basicervical fracture. No dislocations or additional fractures.  Intact pelvic and obturator rings and symmetrically aligned and spaced SI joints and pubic symphysis.  Preserved bilateral hip joint spaces.  No discrete lytic or blastic lesions.    X ray hip  Redemonstrated slightly impacted right hip basicervical fracture. No dislocations or additional fractures.  Intact pelvic and obturator rings and symmetrically aligned and spaced SI joints and pubic symphysis.  Preserved bilateral hip joint spaces.  No discrete lytic or blastic lesions.    Echocardiogram     1. Calcified trileaflet aortic valve with grossly  moderately decreased opening. Peak transaortic valve  gradient equals 15 mm Hg, mean transaortic valve gradient  equals 10 mm Hg. Minimal aortic regurgitation.  2. Normal left ventricular internal dimensions and wall  thicknesses.  3. Endocardium not well visualized; grossly normal left  ventricular systolic function. Endocardial visualization  enhanced with intravenous injection of echo contrast  (Definity).  4. Normal right ventricular size and function.  5. Estimated pulmonary artery systolic pressure equals 44  mm Hg, assuming right atrial pressure equals 10  mm Hg,  consistent with mild pulmonary hypertension. SUBJECTIVE / OVERNIGHT EVENTS: Patient seen and examined. No acute events overnight. Pain well controlled (4/10) and patient without any complaints.     REVIEW OF SYSTEMS:  CONSTITUTIONAL: Patient denies weakness, fevers or chills  EYES/ENT: Patient denies visual changes;  denies vertigo or throat pain   NECK: Patient denies pain or stiffness  RESPIRATORY: Patient denies cough, wheezing, hemoptysis; denies shortness of breath  CARDIOVASCULAR: Patient denies chest pain or palpitations  GASTROINTESTINAL: Patient denies abdominal or epigastric pain, nausea, vomiting, or hematemesis, diarrhea or constipation, melena or hematochezia.  GENITOURINARY: Patient denies dysuria, frequency or hematuria  NEUROLOGICAL: Patient denies numbness or weakness  SKIN: Patient denies itching, burning, rashes, or lesions   All other review of systems is negative unless indicated above.    MEDICATIONS  (STANDING):  acetaminophen   Tablet .. 975 milliGRAM(s) Oral every 8 hours  ascorbic acid 500 milliGRAM(s) Oral daily  calcium carbonate 1250 mG  + Vitamin D (OsCal 500 + D) 1 Tablet(s) Oral daily  chlorhexidine 2% Cloths 1 Application(s) Topical once  enoxaparin Injectable 40 milliGRAM(s) SubCutaneous daily  famotidine    Tablet 20 milliGRAM(s) Oral daily  furosemide    Tablet 20 milliGRAM(s) Oral daily  lactulose Syrup 15 Gram(s) Oral two times a day  pantoprazole    Tablet 40 milliGRAM(s) Oral daily  povidone iodine 5% Nasal Swab 1 Application(s) Both Nostrils once  propranolol 10 milliGRAM(s) Oral two times a day  senna 2 Tablet(s) Oral at bedtime  sodium chloride 0.9%. 1000 milliLiter(s) (75 mL/Hr) IV Continuous <Continuous>    MEDICATIONS  (PRN):  ondansetron Injectable 4 milliGRAM(s) IV Push every 6 hours PRN Nausea and/or Vomiting  oxyCODONE    IR 5 milliGRAM(s) Oral every 4 hours PRN Severe Pain (7 - 10)  oxyCODONE    IR 2.5 milliGRAM(s) Oral every 4 hours PRN Mild or Moderate pain    Vital Signs Last 24 Hrs  T(C): 36.1 (21 Aug 2020 08:34), Max: 37.1 (20 Aug 2020 09:38)  T(F): 96.9 (21 Aug 2020 08:34), Max: 98.7 (20 Aug 2020 09:38)  HR: 62 (21 Aug 2020 08:34) (62 - 101)  BP: 113/60 (21 Aug 2020 08:34) (85/50 - 133/57)  BP(mean): --  RR: 16 (21 Aug 2020 08:34) (16 - 18)  SpO2: 99% (21 Aug 2020 08:34) (99% - 100%)    PHYSICAL EXAM:  GENERAL: NAD, well-groomed, well-developed, sitting in chair bedside   HEENT: MMM, no JVD, no lymphadenopathy   CHEST/LUNG: Clear to percussion bilaterally; No rales, rhonchi, wheezing, or rubs  HEART: Regular rate and rhythm; No murmurs, rubs, or gallops  ABDOMEN: Soft, Nontender, Nondistended; Bowel sounds present  EXTREMITIES: patient w/o hematoma, now lower extremity swelling   SKIN: Dressing C/D/I    LABS:                         8.2    5.89  )-----------( 115      ( 21 Aug 2020 08:10 )             25.0     08-21    139  |  111<H>  |  36<H>  ----------------------------<  132<H>  4.4   |  18<L>  |  1.56<H>    Ca    8.2<L>      21 Aug 2020 05:44  Phos  2.7     08-19  Mg     1.7     08-19    TPro  8.3  /  Alb  3.9  /  TBili  0.7  /  DBili  x   /  AST  45<H>  /  ALT  17  /  AlkPhos  132<H>  08-19    PT/INR - ( 19 Aug 2020 11:30 )   PT: 17.0 SEC;   INR: 1.52          PTT - ( 19 Aug 2020 11:30 )  PTT:36.5 SEC    CARDIAC MARKERS ( 19 Aug 2020 21:37 )  x     / x     / 103 u/L / 3.21 ng/mL / x        X ray pelvis   Redemonstrated slightly impacted right hip basicervical fracture. No dislocations or additional fractures.  Intact pelvic and obturator rings and symmetrically aligned and spaced SI joints and pubic symphysis.  Preserved bilateral hip joint spaces.  No discrete lytic or blastic lesions.    X ray hip  Redemonstrated slightly impacted right hip basicervical fracture. No dislocations or additional fractures.  Intact pelvic and obturator rings and symmetrically aligned and spaced SI joints and pubic symphysis.  Preserved bilateral hip joint spaces.  No discrete lytic or blastic lesions.    Echocardiogram     1. Calcified trileaflet aortic valve with grossly  moderately decreased opening. Peak transaortic valve  gradient equals 15 mm Hg, mean transaortic valve gradient  equals 10 mm Hg. Minimal aortic regurgitation.  2. Normal left ventricular internal dimensions and wall  thicknesses.  3. Endocardium not well visualized; grossly normal left  ventricular systolic function. Endocardial visualization  enhanced with intravenous injection of echo contrast  (Definity).  4. Normal right ventricular size and function.  5. Estimated pulmonary artery systolic pressure equals 44  mm Hg, assuming right atrial pressure equals 10  mm Hg,  consistent with mild pulmonary hypertension.

## 2020-08-21 NOTE — PROGRESS NOTE ADULT - ASSESSMENT
80M s/p R IMN, POD#2    Plan:  -WBAT  -pain control  -incentive spirometry  -DVT ppx: Lovenox  -PT/OT  -f/u am labs  -monitor EKG and f/u TTE  -dispo planning

## 2020-08-22 DIAGNOSIS — R41.0 DISORIENTATION, UNSPECIFIED: ICD-10-CM

## 2020-08-22 PROCEDURE — 99232 SBSQ HOSP IP/OBS MODERATE 35: CPT

## 2020-08-22 RX ADMIN — Medication 975 MILLIGRAM(S): at 19:02

## 2020-08-22 RX ADMIN — Medication 1 TABLET(S): at 12:54

## 2020-08-22 RX ADMIN — Medication 500 MILLIGRAM(S): at 12:54

## 2020-08-22 RX ADMIN — Medication 975 MILLIGRAM(S): at 10:12

## 2020-08-22 RX ADMIN — LACTULOSE 15 GRAM(S): 10 SOLUTION ORAL at 18:30

## 2020-08-22 RX ADMIN — PANTOPRAZOLE SODIUM 40 MILLIGRAM(S): 20 TABLET, DELAYED RELEASE ORAL at 12:55

## 2020-08-22 RX ADMIN — ENOXAPARIN SODIUM 40 MILLIGRAM(S): 100 INJECTION SUBCUTANEOUS at 12:54

## 2020-08-22 RX ADMIN — OXYCODONE HYDROCHLORIDE 5 MILLIGRAM(S): 5 TABLET ORAL at 12:58

## 2020-08-22 RX ADMIN — Medication 975 MILLIGRAM(S): at 09:28

## 2020-08-22 RX ADMIN — OXYCODONE HYDROCHLORIDE 5 MILLIGRAM(S): 5 TABLET ORAL at 23:55

## 2020-08-22 RX ADMIN — SENNA PLUS 2 TABLET(S): 8.6 TABLET ORAL at 20:54

## 2020-08-22 RX ADMIN — Medication 975 MILLIGRAM(S): at 18:29

## 2020-08-22 RX ADMIN — OXYCODONE HYDROCHLORIDE 5 MILLIGRAM(S): 5 TABLET ORAL at 22:53

## 2020-08-22 RX ADMIN — OXYCODONE HYDROCHLORIDE 5 MILLIGRAM(S): 5 TABLET ORAL at 13:29

## 2020-08-22 NOTE — PROGRESS NOTE ADULT - PROBLEM SELECTOR PLAN 6
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

## 2020-08-22 NOTE — PROGRESS NOTE ADULT - ASSESSMENT
80M s/p R IMN, POD#3    Plan:  -WBAT  -pain control  -incentive spirometry  -DVT ppx: Lovenox  -PT/OT  -f/u am labs  -monitor EKG and f/u TTE  -dispo planning

## 2020-08-22 NOTE — PROGRESS NOTE ADULT - PROBLEM SELECTOR PLAN 3
Patient w/ RBBB on EKG, QR pattern in precordial leads cardiology recommending echocardiogram to rule out RHF  - echocardiogram w/ normal right ventricular fxn, mild phtn PASP 44; no intervention required

## 2020-08-22 NOTE — PROGRESS NOTE ADULT - PROBLEM SELECTOR PLAN 2
Patient hemoglobin 14 from admission date, latest hemoglobin 8.2   -  patient post-op R basicervical FN fracture s/p repair, perioperative losses?; patient hemodynamically stable   - if hemoglobin <7 transfuse, maintain active type and screen   - would monitor BM patient w/ history of esophageal varices in the past, reports no melena or hematochezia   - monitor BP   - likely multifactorial including dilutional; patient has been on IVF since 8/19 all cell lines down trending   #Thrombocytopenia  - likely dilutional all cell lines downtrending; if noted w/ active bleeding transfuse if platelets <50K, or no active bleeding and platelets <10K  - daily CBC Patient hemoglobin 14 from admission date, latest hemoglobin 8.2 yesterday. - Please repeat CBC.  - Patient post-op R basicervical FN fracture s/p repair, perioperative losses?; patient hemodynamically stable   - if hemoglobin <7 transfuse, maintain active type and screen   - would monitor BM patient w/ history of esophageal varices in the past, reports no melena or hematochezia; nurse denies any melanotic or bloody stools  - monitor BP   - likely multifactorial including dilutional; patient has been on IVF since 8/19 all cell lines down trending   #Thrombocytopenia  - likely dilutional all cell lines downtrending; if noted w/ active bleeding transfuse if platelets <50K, or no active bleeding and platelets <10K  - daily CBC; please obtain

## 2020-08-22 NOTE — PROGRESS NOTE ADULT - PROBLEM SELECTOR PLAN 1
-Pain well controlled; continue management and pain control per ortho recs with tylenol, oxycodone prn  -c/w bowel regimen  -c/w incentive spirometer use  -c/w PT -Pain not controlled on exam complaining of 7/10 pain however notably not always asking for prns; encourage patient to ask for prn pain medication   -c/w bowel regimen  -c/w incentive spirometer use  -c/w PT

## 2020-08-22 NOTE — PROGRESS NOTE ADULT - PROBLEM SELECTOR PLAN 7
-continue lovenox daily Upon entering room patient thought it was dinner time; it was lunch time. States he has become disoriented w/o having his glasses.  - encouraged nurse to locate glasses to avoid post op delirium  - OOB to chair   - delirium precautions

## 2020-08-22 NOTE — PROGRESS NOTE ADULT - SUBJECTIVE AND OBJECTIVE BOX
rtho Post-Op Progress Note    80M s/p R IMN, POD#3  Patient is seen and examined at bedside; no acute events overnight. Pain is well controlled. Denies CP, SOB, or dizziness.     Vitals 24hrs  Vital Signs Last 24 Hrs  T(C): 36.8 (22 Aug 2020 04:46), Max: 36.8 (22 Aug 2020 04:46)  T(F): 98.2 (22 Aug 2020 04:46), Max: 98.2 (22 Aug 2020 04:46)  HR: 59 (22 Aug 2020 04:46) (59 - 66)  BP: 122/53 (22 Aug 2020 04:46) (96/47 - 133/57)  BP(mean): --  RR: 16 (22 Aug 2020 04:46) (16 - 18)  SpO2: 98% (22 Aug 2020 04:46) (97% - 100%)      08-20-20 @ 07:01  -  08-21-20 @ 07:00  --------------------------------------------------------  IN: 2225 mL / OUT: 1951 mL / NET: 274 mL    08-21-20 @ 07:01  -  08-22-20 @ 04:55  --------------------------------------------------------  IN: 750 mL / OUT: 600 mL / NET: 150 mL        Lab Results 24hrs:                        8.2    5.89  )-----------( 115      ( 21 Aug 2020 08:10 )             25.0     08-21    139  |  111<H>  |  36<H>  ----------------------------<  132<H>  4.4   |  18<L>  |  1.56<H>    Ca    8.2<L>      21 Aug 2020 05:44            PHYSICAL EXAM:  General: Sitting up in bed, NAD  RLE: Proximal and distal dressing lightly stained with serosanguinous drainage. Motor intact + EHL/FHL/TA/GS.  Sensation is grossly intact.  Extremity warm, compartments soft, compressible. No calf tenderness. DP 2+   LLE: Motor intact +EHL/FHL/TA/GS. Sensation is grossly intact. Extremity warm, compartments soft, compressible. No calf tenderness. DP2+

## 2020-08-22 NOTE — PROGRESS NOTE ADULT - PROBLEM SELECTOR PLAN 4
- pt on furosemide at home for history of cirrhosis  - per cards c/w furosemide at this time and monitor creatinine closely, creatinine stable

## 2020-08-22 NOTE — PROGRESS NOTE ADULT - ASSESSMENT
80M h/o HTN, CKD3, GERD with Dyspepsia/H. Pylori (10/2019), distant GBS, Decompensated Alcoholic Cirrhosis (now sober, off transplant list at [facility] as improved, w/ Small Grade 1 Esophogeal Varices p/w mechanical fall found to have R basicervical FN fracture s/p repair.

## 2020-08-22 NOTE — PROGRESS NOTE ADULT - SUBJECTIVE AND OBJECTIVE BOX
SUBJECTIVE / OVERNIGHT EVENTS: Patient seen and examined. No acute events overnight. Pain well controlled (4/10) and patient without any complaints.     REVIEW OF SYSTEMS:  CONSTITUTIONAL: Patient denies weakness, fevers or chills  EYES/ENT: Patient denies visual changes;  denies vertigo or throat pain   NECK: Patient denies pain or stiffness  RESPIRATORY: Patient denies cough, wheezing, hemoptysis; denies shortness of breath  CARDIOVASCULAR: Patient denies chest pain or palpitations  GASTROINTESTINAL: Patient denies abdominal or epigastric pain, nausea, vomiting, or hematemesis, diarrhea or constipation, melena or hematochezia.  GENITOURINARY: Patient denies dysuria, frequency or hematuria  NEUROLOGICAL: Patient denies numbness or weakness  SKIN: Patient denies itching, burning, rashes, or lesions   All other review of systems is negative unless indicated above.    MEDICATIONS  (STANDING):  acetaminophen   Tablet .. 975 milliGRAM(s) Oral every 8 hours  ascorbic acid 500 milliGRAM(s) Oral daily  calcium carbonate 1250 mG  + Vitamin D (OsCal 500 + D) 1 Tablet(s) Oral daily  chlorhexidine 2% Cloths 1 Application(s) Topical once  enoxaparin Injectable 40 milliGRAM(s) SubCutaneous daily  furosemide    Tablet 20 milliGRAM(s) Oral daily  lactulose Syrup 15 Gram(s) Oral two times a day  pantoprazole    Tablet 40 milliGRAM(s) Oral daily  povidone iodine 5% Nasal Swab 1 Application(s) Both Nostrils once  propranolol 10 milliGRAM(s) Oral two times a day  senna 2 Tablet(s) Oral at bedtime  sodium chloride 0.9%. 1000 milliLiter(s) (75 mL/Hr) IV Continuous <Continuous>    MEDICATIONS  (PRN):  ondansetron Injectable 4 milliGRAM(s) IV Push every 6 hours PRN Nausea and/or Vomiting  oxyCODONE    IR 5 milliGRAM(s) Oral every 4 hours PRN Severe Pain (7 - 10)  oxyCODONE    IR 2.5 milliGRAM(s) Oral every 4 hours PRN Mild or Moderate pain    Vital Signs Last 24 Hrs  T(C): 36.8 (22 Aug 2020 04:46), Max: 36.8 (22 Aug 2020 04:46)  T(F): 98.2 (22 Aug 2020 04:46), Max: 98.2 (22 Aug 2020 04:46)  HR: 59 (22 Aug 2020 04:46) (59 - 62)  BP: 122/53 (22 Aug 2020 04:46) (96/47 - 122/53)  BP(mean): --  RR: 16 (22 Aug 2020 04:46) (16 - 18)  SpO2: 98% (22 Aug 2020 04:46) (97% - 100%)    PHYSICAL EXAM:  GENERAL: NAD, well-groomed, well-developed, sitting in chair bedside   HEENT: MMM, no JVD, no lymphadenopathy   CHEST/LUNG: Clear to percussion bilaterally; No rales, rhonchi, wheezing, or rubs  HEART: Regular rate and rhythm; No murmurs, rubs, or gallops  ABDOMEN: Soft, Nontender, Nondistended; Bowel sounds present  EXTREMITIES: patient w/o hematoma, now lower extremity swelling   SKIN: Dressing C/D/I    LABS:                         8.2    5.89  )-----------( 115      ( 21 Aug 2020 08:10 )             25.0     08-21    139  |  111<H>  |  36<H>  ----------------------------<  132<H>  4.4   |  18<L>  |  1.56<H>    Ca    8.2<L>      21 Aug 2020 05:44      X ray pelvis   Redemonstrated slightly impacted right hip basicervical fracture. No dislocations or additional fractures.  Intact pelvic and obturator rings and symmetrically aligned and spaced SI joints and pubic symphysis.  Preserved bilateral hip joint spaces.  No discrete lytic or blastic lesions.    X ray hip  Redemonstrated slightly impacted right hip basicervical fracture. No dislocations or additional fractures.  Intact pelvic and obturator rings and symmetrically aligned and spaced SI joints and pubic symphysis.  Preserved bilateral hip joint spaces.  No discrete lytic or blastic lesions.    Echocardiogram     1. Calcified trileaflet aortic valve with grossly  moderately decreased opening. Peak transaortic valve  gradient equals 15 mm Hg, mean transaortic valve gradient  equals 10 mm Hg. Minimal aortic regurgitation.  2. Normal left ventricular internal dimensions and wall  thicknesses.  3. Endocardium not well visualized; grossly normal left  ventricular systolic function. Endocardial visualization  enhanced with intravenous injection of echo contrast  (Definity).  4. Normal right ventricular size and function.  5. Estimated pulmonary artery systolic pressure equals 44  mm Hg, assuming right atrial pressure equals 10  mm Hg,  consistent with mild pulmonary hypertension. SUBJECTIVE / OVERNIGHT EVENTS: Patient seen and examined at bedside. Complaining about not having glasses and not knowing what time of day it is. Patient otherwise w/o complaints. Nurse made aware will attempt to get glasses from security.     REVIEW OF SYSTEMS:  CONSTITUTIONAL: Patient denies weakness, fevers or chills  EYES/ENT: Patient denies visual changes;  denies vertigo or throat pain   NECK: Patient denies pain or stiffness  RESPIRATORY: Patient denies cough, wheezing, hemoptysis; denies shortness of breath  CARDIOVASCULAR: Patient denies chest pain or palpitations  GASTROINTESTINAL: Patient denies abdominal or epigastric pain, nausea, vomiting, or hematemesis, diarrhea or constipation, melena or hematochezia.  GENITOURINARY: Patient denies dysuria, frequency or hematuria  NEUROLOGICAL: Patient denies numbness or weakness  MUSCULOSKELETAL: + hip pain   SKIN: Patient denies itching, burning, rashes, or lesions   All other review of systems is negative unless indicated above.    MEDICATIONS  (STANDING):  acetaminophen   Tablet .. 975 milliGRAM(s) Oral every 8 hours  ascorbic acid 500 milliGRAM(s) Oral daily  calcium carbonate 1250 mG  + Vitamin D (OsCal 500 + D) 1 Tablet(s) Oral daily  chlorhexidine 2% Cloths 1 Application(s) Topical once  enoxaparin Injectable 40 milliGRAM(s) SubCutaneous daily  furosemide    Tablet 20 milliGRAM(s) Oral daily  lactulose Syrup 15 Gram(s) Oral two times a day  pantoprazole    Tablet 40 milliGRAM(s) Oral daily  povidone iodine 5% Nasal Swab 1 Application(s) Both Nostrils once  propranolol 10 milliGRAM(s) Oral two times a day  senna 2 Tablet(s) Oral at bedtime  sodium chloride 0.9%. 1000 milliLiter(s) (75 mL/Hr) IV Continuous <Continuous>    MEDICATIONS  (PRN):  ondansetron Injectable 4 milliGRAM(s) IV Push every 6 hours PRN Nausea and/or Vomiting  oxyCODONE    IR 5 milliGRAM(s) Oral every 4 hours PRN Severe Pain (7 - 10)  oxyCODONE    IR 2.5 milliGRAM(s) Oral every 4 hours PRN Mild or Moderate pain    Vital Signs Last 24 Hrs  T(C): 36.8 (22 Aug 2020 04:46), Max: 36.8 (22 Aug 2020 04:46)  T(F): 98.2 (22 Aug 2020 04:46), Max: 98.2 (22 Aug 2020 04:46)  HR: 59 (22 Aug 2020 04:46) (59 - 62)  BP: 122/53 (22 Aug 2020 04:46) (96/47 - 122/53)  BP(mean): --  RR: 16 (22 Aug 2020 04:46) (16 - 18)  SpO2: 98% (22 Aug 2020 04:46) (97% - 100%)    PHYSICAL EXAM:  GENERAL: NAD, well-groomed, well-developed, sitting in chair bedside   HEENT: MMM, no JVD, no lymphadenopathy   CHEST/LUNG: Clear to percussion bilaterally; No rales, rhonchi, wheezing, or rubs  HEART: Regular rate and rhythm; No murmurs, rubs, or gallops  ABDOMEN: Soft, Nontender, Nondistended; Bowel sounds present  EXTREMITIES: patient w/o hematoma, now lower extremity swelling   SKIN: Dressing C/D/I    LABS:                         8.2    5.89  )-----------( 115      ( 21 Aug 2020 08:10 )             25.0     08-21    139  |  111<H>  |  36<H>  ----------------------------<  132<H>  4.4   |  18<L>  |  1.56<H>    Ca    8.2<L>      21 Aug 2020 05:44      X ray pelvis   Redemonstrated slightly impacted right hip basicervical fracture. No dislocations or additional fractures.  Intact pelvic and obturator rings and symmetrically aligned and spaced SI joints and pubic symphysis.  Preserved bilateral hip joint spaces.  No discrete lytic or blastic lesions.    X ray hip  Redemonstrated slightly impacted right hip basicervical fracture. No dislocations or additional fractures.  Intact pelvic and obturator rings and symmetrically aligned and spaced SI joints and pubic symphysis.  Preserved bilateral hip joint spaces.  No discrete lytic or blastic lesions.    Echocardiogram     1. Calcified trileaflet aortic valve with grossly  moderately decreased opening. Peak transaortic valve  gradient equals 15 mm Hg, mean transaortic valve gradient  equals 10 mm Hg. Minimal aortic regurgitation.  2. Normal left ventricular internal dimensions and wall  thicknesses.  3. Endocardium not well visualized; grossly normal left  ventricular systolic function. Endocardial visualization  enhanced with intravenous injection of echo contrast  (Definity).  4. Normal right ventricular size and function.  5. Estimated pulmonary artery systolic pressure equals 44  mm Hg, assuming right atrial pressure equals 10  mm Hg,  consistent with mild pulmonary hypertension.

## 2020-08-23 LAB — SARS-COV-2 RNA SPEC QL NAA+PROBE: SIGNIFICANT CHANGE UP

## 2020-08-23 PROCEDURE — 99232 SBSQ HOSP IP/OBS MODERATE 35: CPT

## 2020-08-23 RX ADMIN — Medication 1 TABLET(S): at 12:02

## 2020-08-23 RX ADMIN — ENOXAPARIN SODIUM 40 MILLIGRAM(S): 100 INJECTION SUBCUTANEOUS at 12:02

## 2020-08-23 RX ADMIN — OXYCODONE HYDROCHLORIDE 2.5 MILLIGRAM(S): 5 TABLET ORAL at 20:17

## 2020-08-23 RX ADMIN — LACTULOSE 15 GRAM(S): 10 SOLUTION ORAL at 05:19

## 2020-08-23 RX ADMIN — Medication 500 MILLIGRAM(S): at 12:01

## 2020-08-23 RX ADMIN — OXYCODONE HYDROCHLORIDE 5 MILLIGRAM(S): 5 TABLET ORAL at 13:11

## 2020-08-23 RX ADMIN — LACTULOSE 15 GRAM(S): 10 SOLUTION ORAL at 17:28

## 2020-08-23 RX ADMIN — SENNA PLUS 2 TABLET(S): 8.6 TABLET ORAL at 21:18

## 2020-08-23 RX ADMIN — OXYCODONE HYDROCHLORIDE 2.5 MILLIGRAM(S): 5 TABLET ORAL at 20:50

## 2020-08-23 RX ADMIN — OXYCODONE HYDROCHLORIDE 5 MILLIGRAM(S): 5 TABLET ORAL at 12:02

## 2020-08-23 RX ADMIN — PANTOPRAZOLE SODIUM 40 MILLIGRAM(S): 20 TABLET, DELAYED RELEASE ORAL at 12:02

## 2020-08-23 NOTE — PROGRESS NOTE ADULT - PROBLEM SELECTOR PLAN 2
Patient hemoglobin 14 from admission date, latest hemoglobin 8.2, patient hemodynamically stable. Likely perioperative losses.   - if hemoglobin <7 transfuse, maintain active type and screen   - would monitor BM patient w/ history of esophageal varices in the past, reports no melena or hematochezia; nurse denies any melanotic or bloody stools  - monitor BP   - likely multifactorial including dilutional; patient received IVF  #Thrombocytopenia  - likely dilutional patient received IVF this admission   - no signs of active bleeding; if w/ active bleeding transfuse if platelets <50k

## 2020-08-23 NOTE — PROGRESS NOTE ADULT - SUBJECTIVE AND OBJECTIVE BOX
Pt seen/examined. Doing well. Pain controlled. No acute overnight complaints or events.    T(C): 36.9 (08-23-20 @ 05:15), Max: 36.9 (08-23-20 @ 05:15)  HR: 57 (08-23-20 @ 05:15) (57 - 88)  BP: 130/59 (08-23-20 @ 05:15) (130/59 - 152/68)  RR: 18 (08-23-20 @ 05:15) (17 - 18)  SpO2: 98% (08-23-20 @ 05:15) (98% - 100%)  Wt(kg): --        Gen: awake, alert, NAD  Resp: no increased work of breathing  RLE:  +EHL/FHL/TA/GS  SILT S/S/SP/DP  +DP/PT Pulses  Compartments soft  No calf TTP     80yMale s/p    - Pain control  - mechanical/DVT ppx  - OOB/PT Pt seen/examined. Doing well. Pain controlled. No acute overnight complaints or events.    T(C): 36.9 (08-23-20 @ 05:15), Max: 36.9 (08-23-20 @ 05:15)  HR: 57 (08-23-20 @ 05:15) (57 - 88)  BP: 130/59 (08-23-20 @ 05:15) (130/59 - 152/68)  RR: 18 (08-23-20 @ 05:15) (17 - 18)  SpO2: 98% (08-23-20 @ 05:15) (98% - 100%)  Wt(kg): --        Gen: awake, alert, NAD  Resp: no increased work of breathing  RLE:  Dressing c/d/i  +EHL/FHL/TA/GS  SILT S/S/SP/DP  +DP Pulses  Compartments soft  No calf TTP     80yMale s/p    - Pain control  - mechanical/DVT ppx  - OOB/PT

## 2020-08-23 NOTE — PROGRESS NOTE ADULT - SUBJECTIVE AND OBJECTIVE BOX
SUBJECTIVE / OVERNIGHT EVENTS: Patient seen and examined at bedside. Patient otherwise w/o complaints.     REVIEW OF SYSTEMS:  CONSTITUTIONAL: Patient denies weakness, fevers or chills  EYES/ENT: Patient denies visual changes;  denies vertigo or throat pain   NECK: Patient denies pain or stiffness  RESPIRATORY: Patient denies cough, wheezing, hemoptysis; denies shortness of breath  CARDIOVASCULAR: Patient denies chest pain or palpitations  GASTROINTESTINAL: Patient denies abdominal or epigastric pain, nausea, vomiting, or hematemesis, diarrhea or constipation, melena or hematochezia.  GENITOURINARY: Patient denies dysuria, frequency or hematuria  NEUROLOGICAL: Patient denies numbness or weakness  MUSCULOSKELETAL: + hip pain   SKIN: Patient denies itching, burning, rashes, or lesions   All other review of systems is negative unless indicated above.    MEDICATIONS  (STANDING):  ascorbic acid 500 milliGRAM(s) Oral daily  calcium carbonate 1250 mG  + Vitamin D (OsCal 500 + D) 1 Tablet(s) Oral daily  chlorhexidine 2% Cloths 1 Application(s) Topical once  enoxaparin Injectable 40 milliGRAM(s) SubCutaneous daily  furosemide    Tablet 20 milliGRAM(s) Oral daily  lactulose Syrup 15 Gram(s) Oral two times a day  pantoprazole    Tablet 40 milliGRAM(s) Oral daily  povidone iodine 5% Nasal Swab 1 Application(s) Both Nostrils once  propranolol 10 milliGRAM(s) Oral two times a day  senna 2 Tablet(s) Oral at bedtime    MEDICATIONS  (PRN):  ondansetron Injectable 4 milliGRAM(s) IV Push every 6 hours PRN Nausea and/or Vomiting  oxyCODONE    IR 5 milliGRAM(s) Oral every 4 hours PRN Severe Pain (7 - 10)  oxyCODONE    IR 2.5 milliGRAM(s) Oral every 4 hours PRN Mild or Moderate pain    Vital Signs Last 24 Hrs  T(C): 36.7 (23 Aug 2020 08:05), Max: 36.9 (23 Aug 2020 05:15)  T(F): 98 (23 Aug 2020 08:05), Max: 98.4 (23 Aug 2020 05:15)  HR: 63 (23 Aug 2020 13:22) (56 - 73)  BP: 135/58 (23 Aug 2020 13:22) (130/59 - 152/68)  BP(mean): --  RR: 17 (23 Aug 2020 13:22) (17 - 18)  SpO2: 99% (23 Aug 2020 13:22) (98% - 100%)    PHYSICAL EXAM:  GENERAL: NAD, well-groomed, well-developed, sitting in chair bedside   HEENT: MMM, no JVD, no lymphadenopathy   CHEST/LUNG: Clear to percussion bilaterally; No rales, rhonchi, wheezing, or rubs  HEART: Regular rate and rhythm; No murmurs, rubs, or gallops  ABDOMEN: Soft, Nontender, Nondistended; Bowel sounds present  EXTREMITIES: patient w/o hematoma, now lower extremity swelling   SKIN: Dressing C/D/I    LABS: No new labs today     X ray pelvis   Redemonstrated slightly impacted right hip basicervical fracture. No dislocations or additional fractures.  Intact pelvic and obturator rings and symmetrically aligned and spaced SI joints and pubic symphysis.  Preserved bilateral hip joint spaces.  No discrete lytic or blastic lesions.    X ray hip  Redemonstrated slightly impacted right hip basicervical fracture. No dislocations or additional fractures.  Intact pelvic and obturator rings and symmetrically aligned and spaced SI joints and pubic symphysis.  Preserved bilateral hip joint spaces.  No discrete lytic or blastic lesions.    Echocardiogram     1. Calcified trileaflet aortic valve with grossly  moderately decreased opening. Peak transaortic valve  gradient equals 15 mm Hg, mean transaortic valve gradient  equals 10 mm Hg. Minimal aortic regurgitation.  2. Normal left ventricular internal dimensions and wall  thicknesses.  3. Endocardium not well visualized; grossly normal left  ventricular systolic function. Endocardial visualization  enhanced with intravenous injection of echo contrast  (Definity).  4. Normal right ventricular size and function.  5. Estimated pulmonary artery systolic pressure equals 44  mm Hg, assuming right atrial pressure equals 10  mm Hg,  consistent with mild pulmonary hypertension.

## 2020-08-23 NOTE — PROGRESS NOTE ADULT - ASSESSMENT
80M s/p R IMN, POD#4    Plan:  -WBAT  -pain control  -incentive spirometry  -DVT ppx: Lovenox  -PT/OT  -f/u am labs  -dispo planning

## 2020-08-24 ENCOUNTER — TRANSCRIPTION ENCOUNTER (OUTPATIENT)
Age: 80
End: 2020-08-24

## 2020-08-24 VITALS
DIASTOLIC BLOOD PRESSURE: 71 MMHG | HEART RATE: 77 BPM | TEMPERATURE: 98 F | SYSTOLIC BLOOD PRESSURE: 139 MMHG | RESPIRATION RATE: 18 BRPM | OXYGEN SATURATION: 98 %

## 2020-08-24 DIAGNOSIS — S72.044A NONDISPLACED FRACTURE OF BASE OF NECK OF RIGHT FEMUR, INITIAL ENCOUNTER FOR CLOSED FRACTURE: ICD-10-CM

## 2020-08-24 LAB
ANION GAP SERPL CALC-SCNC: 10 MMO/L — SIGNIFICANT CHANGE UP (ref 7–14)
BUN SERPL-MCNC: 19 MG/DL — SIGNIFICANT CHANGE UP (ref 7–23)
CALCIUM SERPL-MCNC: 8.9 MG/DL — SIGNIFICANT CHANGE UP (ref 8.4–10.5)
CHLORIDE SERPL-SCNC: 107 MMOL/L — SIGNIFICANT CHANGE UP (ref 98–107)
CO2 SERPL-SCNC: 22 MMOL/L — SIGNIFICANT CHANGE UP (ref 22–31)
CREAT SERPL-MCNC: 1.15 MG/DL — SIGNIFICANT CHANGE UP (ref 0.5–1.3)
GLUCOSE SERPL-MCNC: 99 MG/DL — SIGNIFICANT CHANGE UP (ref 70–99)
HCT VFR BLD CALC: 26.8 % — LOW (ref 39–50)
HGB BLD-MCNC: 8.6 G/DL — LOW (ref 13–17)
MCHC RBC-ENTMCNC: 26.7 PG — LOW (ref 27–34)
MCHC RBC-ENTMCNC: 32.1 % — SIGNIFICANT CHANGE UP (ref 32–36)
MCV RBC AUTO: 83.2 FL — SIGNIFICANT CHANGE UP (ref 80–100)
NRBC # FLD: 0 K/UL — SIGNIFICANT CHANGE UP (ref 0–0)
PLATELET # BLD AUTO: 164 K/UL — SIGNIFICANT CHANGE UP (ref 150–400)
PMV BLD: 10.8 FL — SIGNIFICANT CHANGE UP (ref 7–13)
POTASSIUM SERPL-MCNC: 4.6 MMOL/L — SIGNIFICANT CHANGE UP (ref 3.5–5.3)
POTASSIUM SERPL-SCNC: 4.6 MMOL/L — SIGNIFICANT CHANGE UP (ref 3.5–5.3)
RBC # BLD: 3.22 M/UL — LOW (ref 4.2–5.8)
RBC # FLD: 17.3 % — HIGH (ref 10.3–14.5)
SODIUM SERPL-SCNC: 139 MMOL/L — SIGNIFICANT CHANGE UP (ref 135–145)
WBC # BLD: 7.89 K/UL — SIGNIFICANT CHANGE UP (ref 3.8–10.5)
WBC # FLD AUTO: 7.89 K/UL — SIGNIFICANT CHANGE UP (ref 3.8–10.5)

## 2020-08-24 PROCEDURE — 99232 SBSQ HOSP IP/OBS MODERATE 35: CPT

## 2020-08-24 PROCEDURE — 99233 SBSQ HOSP IP/OBS HIGH 50: CPT

## 2020-08-24 RX ADMIN — Medication 1 TABLET(S): at 11:31

## 2020-08-24 RX ADMIN — OXYCODONE HYDROCHLORIDE 5 MILLIGRAM(S): 5 TABLET ORAL at 13:07

## 2020-08-24 RX ADMIN — OXYCODONE HYDROCHLORIDE 5 MILLIGRAM(S): 5 TABLET ORAL at 07:58

## 2020-08-24 RX ADMIN — LACTULOSE 15 GRAM(S): 10 SOLUTION ORAL at 05:17

## 2020-08-24 RX ADMIN — OXYCODONE HYDROCHLORIDE 5 MILLIGRAM(S): 5 TABLET ORAL at 02:49

## 2020-08-24 RX ADMIN — PANTOPRAZOLE SODIUM 40 MILLIGRAM(S): 20 TABLET, DELAYED RELEASE ORAL at 11:31

## 2020-08-24 RX ADMIN — ENOXAPARIN SODIUM 40 MILLIGRAM(S): 100 INJECTION SUBCUTANEOUS at 11:31

## 2020-08-24 RX ADMIN — Medication 20 MILLIGRAM(S): at 07:02

## 2020-08-24 RX ADMIN — Medication 500 MILLIGRAM(S): at 11:31

## 2020-08-24 RX ADMIN — OXYCODONE HYDROCHLORIDE 5 MILLIGRAM(S): 5 TABLET ORAL at 08:45

## 2020-08-24 RX ADMIN — OXYCODONE HYDROCHLORIDE 5 MILLIGRAM(S): 5 TABLET ORAL at 03:29

## 2020-08-24 RX ADMIN — OXYCODONE HYDROCHLORIDE 5 MILLIGRAM(S): 5 TABLET ORAL at 13:30

## 2020-08-24 NOTE — PROGRESS NOTE ADULT - PROBLEM SELECTOR PLAN 1
s/p IMN on 8/19  Pain control with oxyIR PRN  - Lovenox SC for VTE prophylaxis.  - PT/OT eval appreciated - plan for [facility].

## 2020-08-24 NOTE — PROGRESS NOTE ADULT - SUBJECTIVE AND OBJECTIVE BOX
[facility] Division of Hospital Medicine  Erik Marr MD  Pager (M-F, 8A-5P): [phone]  Other Times:  [phone]    Patient is a 80y old  Male who presents with a chief complaint of right hip pain (24 Aug 2020 11:06)    SUBJECTIVE / OVERNIGHT EVENTS:  Patient complaining of pain control.    No F/C, N/V, CP, SOB, Cough, lightheadedness, dizziness, abdominal pain, diarrhea, dysuria.    MEDICATIONS  (STANDING):  ascorbic acid 500 milliGRAM(s) Oral daily  calcium carbonate 1250 mG  + Vitamin D (OsCal 500 + D) 1 Tablet(s) Oral daily  chlorhexidine 2% Cloths 1 Application(s) Topical once  enoxaparin Injectable 40 milliGRAM(s) SubCutaneous daily  furosemide    Tablet 20 milliGRAM(s) Oral daily  lactulose Syrup 15 Gram(s) Oral two times a day  pantoprazole    Tablet 40 milliGRAM(s) Oral daily  povidone iodine 5% Nasal Swab 1 Application(s) Both Nostrils once  propranolol 10 milliGRAM(s) Oral two times a day  senna 2 Tablet(s) Oral at bedtime    MEDICATIONS  (PRN):  ondansetron Injectable 4 milliGRAM(s) IV Push every 6 hours PRN Nausea and/or Vomiting  oxyCODONE    IR 5 milliGRAM(s) Oral every 4 hours PRN Severe Pain (7 - 10)  oxyCODONE    IR 2.5 milliGRAM(s) Oral every 4 hours PRN Mild or Moderate pain      Vital Signs Last 24 Hrs  T(C): 37.3 (24 Aug 2020 11:31), Max: 37.5 (23 Aug 2020 20:28)  T(F): 99.2 (24 Aug 2020 11:31), Max: 99.5 (23 Aug 2020 20:28)  HR: 66 (24 Aug 2020 11:31) (60 - 72)  BP: 135/63 (24 Aug 2020 11:31) (126/53 - 145/88)  BP(mean): --  RR: 18 (24 Aug 2020 11:31) (16 - 18)  SpO2: 99% (24 Aug 2020 11:31) (97% - 100%)  CAPILLARY BLOOD GLUCOSE        I&O's Summary    23 Aug 2020 07:01  -  24 Aug 2020 07:00  --------------------------------------------------------  IN: 750 mL / OUT: 1051 mL / NET: -301 mL    24 Aug 2020 07:01  -  24 Aug 2020 12:14  --------------------------------------------------------  IN: 0 mL / OUT: 401 mL / NET: -401 mL        PHYSICAL EXAM:  GENERAL: NAD  HEAD:  Atraumatic, Normocephalic  EYES: EOMI, PERRLA, conjunctiva and sclera clear  NECK: Supple, No JVD  CHEST/LUNG: Clear to auscultation bilaterally; No wheeze  HEART: Regular rate and rhythm; No murmurs, rubs, or gallops  ABDOMEN: Soft, Nontender, Nondistended; Bowel sounds present  BACK: Non tender, ROM intact.  EXTREMITIES:  2+ Peripheral Pulses, No clubbing, cyanosis. RLE limited ROM due to pain.  PSYCH: Calm  NEUROLOGY: AAOx3, non-focal neurological exam  SKIN: Surgical dressing C/D/I    LABS:                        8.6    7.89  )-----------( 164      ( 24 Aug 2020 06:20 )             26.8     08-24    139  |  107  |  19  ----------------------------<  99  4.6   |  22  |  1.15    Ca    8.9      24 Aug 2020 06:20                RADIOLOGY & ADDITIONAL TESTS:    Imaging Personally Reviewed:    Care Discussed with Consultants/Other Providers:    Care Discussed with Orthopedic PA about: Pain control.

## 2020-08-24 NOTE — PROGRESS NOTE ADULT - ATTENDING COMMENTS
Patient medically optimized for discharge.  Discharge planning 40 minutes - discussed with patient and consultants.

## 2020-08-24 NOTE — PROGRESS NOTE ADULT - SUBJECTIVE AND OBJECTIVE BOX
Orthopaedic Surgery Progress Note    Subjective:   Patient seen and examined. No acute events overnight. Pain controlled on current regimen. HR 58-60 on tele, patient awake and alert. Denies numbness, tingling, fever, chills, chest pain, SOB.     Objective:  T(C): 37 (08-24-20 @ 05:07), Max: 37.5 (08-23-20 @ 20:28)  HR: 60 (08-24-20 @ 05:07) (56 - 72)  BP: 145/55 (08-24-20 @ 05:07) (131/70 - 145/88)  RR: 17 (08-24-20 @ 05:07) (16 - 18)  SpO2: 99% (08-24-20 @ 05:07) (97% - 100%)  Wt(kg): --    08-22 @ 07:01  -  08-23 @ 07:00  --------------------------------------------------------  IN: 1290 mL / OUT: 1800 mL / NET: -510 mL    08-23 @ 07:01  -  08-24 @ 06:17  --------------------------------------------------------  IN: 750 mL / OUT: 1051 mL / NET: -301 mL        PE    NAD  RLE:   dressing C/D/I  substantial ecchymosis over posterior thigh, compartments soft and compressible  motor intact GS/TA/EHL  SILT S/S/SP/DP  WWP        80M s/p R IMN, POD#5    Plan:  - okay for AM lasix, hold propanolol for HR<60  -WBAT  -pain control  -incentive spirometry  -DVT ppx: Lovenox  -PT/OT  -monitor EKG  -dispo planning - rehab today Orthopaedic Surgery Progress Note    Subjective:   Patient seen and examined. No acute events overnight. Pain controlled on current regimen. HR 58-60 on tele, patient awake and alert. Denies numbness, tingling, fever, chills, chest pain, SOB.      Objective:  T(C): 37 (08-24-20 @ 05:07), Max: 37.5 (08-23-20 @ 20:28)  HR: 60 (08-24-20 @ 05:07) (56 - 72)  BP: 145/55 (08-24-20 @ 05:07) (131/70 - 145/88)  RR: 17 (08-24-20 @ 05:07) (16 - 18)  SpO2: 99% (08-24-20 @ 05:07) (97% - 100%)  Wt(kg): --    08-22 @ 07:01  -  08-23 @ 07:00  --------------------------------------------------------  IN: 1290 mL / OUT: 1800 mL / NET: -510 mL    08-23 @ 07:01  -  08-24 @ 06:17  --------------------------------------------------------  IN: 750 mL / OUT: 1051 mL / NET: -301 mL        PE    NAD  RLE:   dressing C/D/I  substantial ecchymosis over posterior thigh, compartments soft and compressible  motor intact GS/TA/EHL  SILT S/S/SP/DP  WWP        80M s/p R IMN, POD#5    Plan:  - okay for AM lasix, hold propanolol for HR<60  -WBAT  -pain control  -incentive spirometry  -DVT ppx: Lovenox  -PT/OT  -monitor EKG  -dispo planning - rehab today

## 2020-08-24 NOTE — PROGRESS NOTE ADULT - PROVIDER SPECIALTY LIST ADULT
Cardiology
Cardiology
Hospitalist
Orthopedics
Rehab Medicine
Rehab Medicine
Hospitalist

## 2020-08-24 NOTE — PROGRESS NOTE ADULT - SUBJECTIVE AND OBJECTIVE BOX
Patient is a 80y old  Male who presents with a chief complaint of right hip pain (24 Aug 2020 08:40)    Interval history: patient reports small bm today.  Patient states he worked with PT today, however pain is worsening 8/10 and he is concerned about tolerating therapy.   Reports mild dizziness working with therapy today.    REVIEW OF SYSTEMS  Constitutional - No fever, No weight loss, No fatigue  HEENT - No eye pain, No visual disturbances, No difficulty hearing, No tinnitus, No vertigo, No neck pain  Respiratory - No cough, No wheezing, No shortness of breath  Cardiovascular - No chest pain, No palpitations  Gastrointestinal - No abdominal pain, No nausea, No vomiting, No diarrhea, No constipation  Genitourinary - No dysuria, No frequency, No hematuria, No incontinence  Neurological - No headaches, No memory loss, + loss of strength, No numbness, No tremors  Skin - + dressing in place  Endocrine - No temperature intolerance  Musculoskeletal - + joint pain   Psychiatric - No depression, No anxiety    PAST MEDICAL & SURGICAL HISTORY  Porcelain gallbladder  Liver cirrhosis  Guillain-Annada syndrome  HTN (hypertension)  H/O inguinal hernia repair      CURRENT FUNCTIONAL STATUS  Sit-Stand Transfer Training  Sit-to-Stand Transfer Training Rehab Potential: fair, will monitor progress closely  Sit-to-Stand Transfer Training Symptoms Noted During/After Treatment: increased pain  Transfer Training Sit-to-Stand Transfer: minimum assist (75% patient effort);  2 person assist;  nonverbal cues (demo/gestures);  verbal cues;  set-up required;  weight-bearing as tolerated   rolling walker  Transfer Training Stand-to-Sit Transfer: minimum assist (75% patient effort);  2 person assist;  nonverbal cues (demo/gestures);  verbal cues;  set-up required;  weight-bearing as tolerated   rolling walker  Sit-to-Stand Transfer Training Transfer Safety Analysis: decreased balance;  decreased strength;  impaired balance;  impaired postural control;  pain    Gait Training  Gait Training Rehab Potential: fair, will monitor progress closely  Gait Training Symptoms Noted During/After Treatment: increased pain  Gait Training: moderate assist (50% patient effort);  2 person assist;  nonverbal cues (demo/gestures);  verbal cues;  set-up required;  weight-bearing as tolerated   rolling walker;  5 forward steps +chair follow  Gait Analysis: 3-point gait   occasional assist to negotiate rolling walker    decreased marley;  increased time in double stance;  decreased toe clearance;  decreased step length;  shuffling;  difficulty weight shifting onto right LE for left LE swing phase;  impaired balance;  decreased strength;  impaired postural control;  pain;  5 forward steps;  rolling walker        FAMILY HISTORY   Family history of ovarian cancer (Sibling)      RECENT LABS/IMAGING  CBC Full  -  ( 24 Aug 2020 06:20 )  WBC Count : 7.89 K/uL  RBC Count : 3.22 M/uL  Hemoglobin : 8.6 g/dL  Hematocrit : 26.8 %  Platelet Count - Automated : 164 K/uL  Mean Cell Volume : 83.2 fL  Mean Cell Hemoglobin : 26.7 pg  Mean Cell Hemoglobin Concentration : 32.1 %  Auto Neutrophil # : x  Auto Lymphocyte # : x  Auto Monocyte # : x  Auto Eosinophil # : x  Auto Basophil # : x  Auto Neutrophil % : x  Auto Lymphocyte % : x  Auto Monocyte % : x  Auto Eosinophil % : x  Auto Basophil % : x    08-24    139  |  107  |  19  ----------------------------<  99  4.6   |  22  |  1.15    Ca    8.9      24 Aug 2020 06:20          VITALS  T(C): 36.6 (08-24-20 @ 07:01), Max: 37.5 (08-23-20 @ 20:28)  HR: 61 (08-24-20 @ 07:01) (60 - 72)  BP: 126/53 (08-24-20 @ 07:01) (126/53 - 145/88)  RR: 17 (08-24-20 @ 05:07) (16 - 18)  SpO2: 99% (08-24-20 @ 05:07) (97% - 100%)  Wt(kg): --    ALLERGIES  No Known Allergies      MEDICATIONS   ascorbic acid 500 milliGRAM(s) Oral daily  calcium carbonate 1250 mG  + Vitamin D (OsCal 500 + D) 1 Tablet(s) Oral daily  chlorhexidine 2% Cloths 1 Application(s) Topical once  enoxaparin Injectable 40 milliGRAM(s) SubCutaneous daily  furosemide    Tablet 20 milliGRAM(s) Oral daily  lactulose Syrup 15 Gram(s) Oral two times a day  ondansetron Injectable 4 milliGRAM(s) IV Push every 6 hours PRN  oxyCODONE    IR 5 milliGRAM(s) Oral every 4 hours PRN  oxyCODONE    IR 2.5 milliGRAM(s) Oral every 4 hours PRN  pantoprazole    Tablet 40 milliGRAM(s) Oral daily  povidone iodine 5% Nasal Swab 1 Application(s) Both Nostrils once  propranolol 10 milliGRAM(s) Oral two times a day  senna 2 Tablet(s) Oral at bedtime      ----------------------------------------------------------------------------------------  PHYSICAL EXAM  Constitutional - NAD, Comfortable  HEENT - NCAT   Neck - Supple, No limited ROM  Chest - no respiratory distress  Cardiovascular - no edema  Abdomen - Soft, NTND  Extremities - No C/C/E, No calf tenderness. dressing in place R hip  Neurologic Exam -                    Cognitive - Awake, Alert, AAO to self, place, date, year     Communication - Fluent, No dysarthria     Cranial Nerves - CN 2-12 intact     Motor -                     LEFT    UE - ShAB 5/5, EF 5/5, EE 5/5, WE 5/5,  5/5                    RIGHT UE - ShAB 5/5, EF 5/5, EE 5/5, WE 5/5,  5/5                    LEFT    LE - HF 4+/5, KE 4+/5, DF 5/5, PF 5/5                    RIGHT LE - HF 1/5, KE 2/5, DF 5/5, PF 5/5        Sensory - Intact to LT     OculoVestibular - No saccades, No nystagmus, VOR         Balance - WNL Static  Psychiatric - Mood stable, Affect WNL  ----------------------------------------------------------------------------------------  ASSESSMENT/PLAN  79 yo m pmh HTN, CKD, distant GBS, alcoholic cirrhosis , grade 1 esophageal varices now s/p IM nail right hip 8/19 for R femoral neck basicervical fracture   WBAT  anemia:  Hg 8.6 today  Pain -tylenol prn, oxy prn, with bowel regimen  DVT PPX - lovenox  bedside PT/OT for bed mobility, transfers, ambulation w ad, ADLs  Diet: regular  Rehab -  patient previously recommended for acute rehab however today states his pain is worsened and he reports dizziness with bedside therapy.  Patient states he cannot tolerate 3 hrs/day of therapy and prefers less intensive program.  recommend subacute rehab when medically cleared.

## 2020-08-24 NOTE — PROGRESS NOTE ADULT - REASON FOR ADMISSION
right hip pain

## 2020-08-24 NOTE — PROGRESS NOTE ADULT - ASSESSMENT
.Abnormal EKG  RBBB  QR pattern in precordial leads , echo reviewed  pos pulm HTN but normal RV function      HIP fx  s/p IMN   pain control   dvt proph     DVT proph  on lovenox     ?afib  NO evidence of afib on tele or ekgs     Anemia  Monitor hemoglobin, transfuse as needed.  plan as per primary team

## 2020-08-24 NOTE — DISCHARGE NOTE NURSING/CASE MANAGEMENT/SOCIAL WORK - PATIENT PORTAL LINK FT
You can access the FollowMyHealth Patient Portal offered by [facility] by registering at the following website: [email or web address]. By joining [facility]’s FollowMyHealth portal, you will also be able to view your health information using other applications (apps) compatible with our system.

## 2020-08-24 NOTE — PROGRESS NOTE ADULT - SUBJECTIVE AND OBJECTIVE BOX
Subjective: Patient seen and examined. No new events except as noted.     SUBJECTIVE/ROS:  feels ok       MEDICATIONS:  MEDICATIONS  (STANDING):  ascorbic acid 500 milliGRAM(s) Oral daily  calcium carbonate 1250 mG  + Vitamin D (OsCal 500 + D) 1 Tablet(s) Oral daily  chlorhexidine 2% Cloths 1 Application(s) Topical once  enoxaparin Injectable 40 milliGRAM(s) SubCutaneous daily  furosemide    Tablet 20 milliGRAM(s) Oral daily  lactulose Syrup 15 Gram(s) Oral two times a day  pantoprazole    Tablet 40 milliGRAM(s) Oral daily  povidone iodine 5% Nasal Swab 1 Application(s) Both Nostrils once  propranolol 10 milliGRAM(s) Oral two times a day  senna 2 Tablet(s) Oral at bedtime      PHYSICAL EXAM:  T(C): 36.6 (08-24-20 @ 07:01), Max: 37.5 (08-23-20 @ 20:28)  HR: 61 (08-24-20 @ 07:01) (60 - 72)  BP: 126/53 (08-24-20 @ 07:01) (126/53 - 145/88)  RR: 17 (08-24-20 @ 05:07) (16 - 18)  SpO2: 99% (08-24-20 @ 05:07) (97% - 100%)  Wt(kg): --  I&O's Summary    23 Aug 2020 07:01  -  24 Aug 2020 07:00  --------------------------------------------------------  IN: 750 mL / OUT: 1051 mL / NET: -301 mL    24 Aug 2020 07:01  -  24 Aug 2020 08:40  --------------------------------------------------------  IN: 0 mL / OUT: 200 mL / NET: -200 mL            JVP: Normal  Neck: supple  Lung: clear   CV: S1 S2 , Murmur:  Abd: soft  Ext: No edema  neuro: Awake / alert  Psych: flat affect  Skin: normal``    LABS/DATA:    CARDIAC MARKERS:                                8.6    7.89  )-----------( x        ( 24 Aug 2020 06:20 )             26.8     08-24    139  |  107  |  19  ----------------------------<  99  4.6   |  22  |  1.15    Ca    8.9      24 Aug 2020 06:20      proBNP:   Lipid Profile:   HgA1c:   TSH:     TELE:  EKG:

## 2020-08-24 NOTE — PROGRESS NOTE ADULT - ASSESSMENT
80M h/o HTN, CKD3, GERD with Dyspepsia/H. Pylori (10/2019), distant GBS, Decompensated Alcoholic Cirrhosis (now sober, off transplant list at [facility] as improved, w/ Small Grade 1 Esophogeal Varices p/w mechanical fall found to have R basicervical FN fracture s/p IMN on 8/19 c/b post-op KAY.

## 2020-08-24 NOTE — PROGRESS NOTE ADULT - PROBLEM SELECTOR PROBLEM 1
Closed nondisplaced basicervical fracture of right femur, initial encounter
Post-operative state

## 2020-08-28 ENCOUNTER — RESULT REVIEW (OUTPATIENT)
Age: 80
End: 2020-08-28

## 2020-09-02 ENCOUNTER — RESULT REVIEW (OUTPATIENT)
Age: 80
End: 2020-09-02

## 2020-09-02 ENCOUNTER — OUTPATIENT (OUTPATIENT)
Dept: OUTPATIENT SERVICES | Facility: HOSPITAL | Age: 80
LOS: 1 days | End: 2020-09-02
Payer: MEDICARE

## 2020-09-02 DIAGNOSIS — I82.409 ACUTE EMBOLISM AND THROMBOSIS OF UNSPECIFIED DEEP VEINS OF UNSPECIFIED LOWER EXTREMITY: ICD-10-CM

## 2020-09-02 DIAGNOSIS — Z98.890 OTHER SPECIFIED POSTPROCEDURAL STATES: Chronic | ICD-10-CM

## 2020-09-02 PROCEDURE — 93970 EXTREMITY STUDY: CPT | Mod: 26

## 2020-09-03 ENCOUNTER — APPOINTMENT (OUTPATIENT)
Dept: ORTHOPEDIC SURGERY | Facility: CLINIC | Age: 80
End: 2020-09-03

## 2020-09-03 DIAGNOSIS — Z87.81 OTHER SPECIFIED POSTPROCEDURAL STATES: ICD-10-CM

## 2020-09-03 DIAGNOSIS — Z98.890 OTHER SPECIFIED POSTPROCEDURAL STATES: ICD-10-CM

## 2020-09-03 DIAGNOSIS — S72.001D FRACTURE OF UNSPECIFIED PART OF NECK OF RIGHT FEMUR, SUBSEQUENT ENCOUNTER FOR CLOSED FRACTURE WITH ROUTINE HEALING: ICD-10-CM

## 2020-09-09 ENCOUNTER — RESULT REVIEW (OUTPATIENT)
Age: 80
End: 2020-09-09

## 2020-09-22 ENCOUNTER — RESULT REVIEW (OUTPATIENT)
Age: 80
End: 2020-09-22

## 2020-09-22 LAB
ANION GAP SERPL CALC-SCNC: 11 MMOL/L — SIGNIFICANT CHANGE UP (ref 5–17)
BASOPHILS # BLD AUTO: 0.04 K/UL — SIGNIFICANT CHANGE UP (ref 0–0.2)
BASOPHILS NFR BLD AUTO: 0.6 % — SIGNIFICANT CHANGE UP (ref 0–2)
BUN SERPL-MCNC: 25 MG/DL — HIGH (ref 7–23)
CALCIUM SERPL-MCNC: 9.6 MG/DL — SIGNIFICANT CHANGE UP (ref 8.4–10.5)
CHLORIDE SERPL-SCNC: 102 MMOL/L — SIGNIFICANT CHANGE UP (ref 96–108)
CO2 SERPL-SCNC: 25 MMOL/L — SIGNIFICANT CHANGE UP (ref 22–31)
CREAT SERPL-MCNC: 1.18 MG/DL — SIGNIFICANT CHANGE UP (ref 0.5–1.3)
EOSINOPHIL # BLD AUTO: 0.27 K/UL — SIGNIFICANT CHANGE UP (ref 0–0.5)
EOSINOPHIL NFR BLD AUTO: 3.7 % — SIGNIFICANT CHANGE UP (ref 0–6)
GLUCOSE SERPL-MCNC: 83 MG/DL — SIGNIFICANT CHANGE UP (ref 70–99)
HCT VFR BLD CALC: 34.8 % — LOW (ref 39–50)
HGB BLD-MCNC: 10.5 G/DL — LOW (ref 13–17)
IMM GRANULOCYTES NFR BLD AUTO: 1.4 % — SIGNIFICANT CHANGE UP (ref 0–1.5)
LYMPHOCYTES # BLD AUTO: 1.05 K/UL — SIGNIFICANT CHANGE UP (ref 1–3.3)
LYMPHOCYTES # BLD AUTO: 14.5 % — SIGNIFICANT CHANGE UP (ref 13–44)
MCHC RBC-ENTMCNC: 26 PG — LOW (ref 27–34)
MCHC RBC-ENTMCNC: 30.2 GM/DL — LOW (ref 32–36)
MCV RBC AUTO: 86.1 FL — SIGNIFICANT CHANGE UP (ref 80–100)
MONOCYTES # BLD AUTO: 0.81 K/UL — SIGNIFICANT CHANGE UP (ref 0–0.9)
MONOCYTES NFR BLD AUTO: 11.2 % — SIGNIFICANT CHANGE UP (ref 2–14)
NEUTROPHILS # BLD AUTO: 4.96 K/UL — SIGNIFICANT CHANGE UP (ref 1.8–7.4)
NEUTROPHILS NFR BLD AUTO: 68.6 % — SIGNIFICANT CHANGE UP (ref 43–77)
NRBC # BLD: 0 /100 WBCS — SIGNIFICANT CHANGE UP (ref 0–0)
PLATELET # BLD AUTO: 187 K/UL — SIGNIFICANT CHANGE UP (ref 150–400)
POTASSIUM SERPL-MCNC: 4.4 MMOL/L — SIGNIFICANT CHANGE UP (ref 3.5–5.3)
POTASSIUM SERPL-SCNC: 4.4 MMOL/L — SIGNIFICANT CHANGE UP (ref 3.5–5.3)
RBC # BLD: 4.04 M/UL — LOW (ref 4.2–5.8)
RBC # FLD: 16.4 % — HIGH (ref 10.3–14.5)
SODIUM SERPL-SCNC: 138 MMOL/L — SIGNIFICANT CHANGE UP (ref 135–145)
WBC # BLD: 7.23 K/UL — SIGNIFICANT CHANGE UP (ref 3.8–10.5)
WBC # FLD AUTO: 7.23 K/UL — SIGNIFICANT CHANGE UP (ref 3.8–10.5)

## 2020-09-22 PROCEDURE — 85025 COMPLETE CBC W/AUTO DIFF WBC: CPT

## 2020-09-22 PROCEDURE — 93970 EXTREMITY STUDY: CPT

## 2020-09-22 PROCEDURE — 80048 BASIC METABOLIC PNL TOTAL CA: CPT

## 2020-10-01 ENCOUNTER — APPOINTMENT (OUTPATIENT)
Dept: ORTHOPEDIC SURGERY | Facility: CLINIC | Age: 80
End: 2020-10-01
Payer: MEDICARE

## 2020-10-01 VITALS
BODY MASS INDEX: 30.82 KG/M2 | WEIGHT: 185 LBS | SYSTOLIC BLOOD PRESSURE: 161 MMHG | HEART RATE: 59 BPM | OXYGEN SATURATION: 97 % | DIASTOLIC BLOOD PRESSURE: 96 MMHG | HEIGHT: 65 IN

## 2020-10-01 PROCEDURE — 99024 POSTOP FOLLOW-UP VISIT: CPT

## 2020-10-01 PROCEDURE — 73502 X-RAY EXAM HIP UNI 2-3 VIEWS: CPT | Mod: TC,RT

## 2020-10-02 NOTE — HISTORY OF PRESENT ILLNESS
[___ Weeks Post Op] : [unfilled] weeks post op [4] : the patient reports pain that is 4/10 in severity [Clean/Dry/Intact] : clean, dry and intact [Swelling] : not swollen [Slow Progress] : is progressing slowly [Fair Pain Control] : has fair pain control [No Sign of Infection] : is showing no signs of infection [de-identified] : 8/19/2020 - R hip ORIF [de-identified] : Patient is now 6 weeks after his intramedullary nail for right hip fracture.  He has not been progressing very well.  He is not walking well.  He still has significant pain and is not participating much with therapy. [de-identified] : Patient is encountered in a wheelchair today.  Range of motion of his hip does cause significant pain around the groin.  He has minimal pain laterally. [de-identified] : X-rays today as well as those reviewed from the rehab center show complete loss of fixation with dissociation of the femoral neck fracture with the femoral head now on that position.  The screw has migrated superiorly in the femoral head but is not out yet. [de-identified] : This is a very unfortunate turn of events with complete nonhealing of the fracture.  At this point I think this is a failure of fixation and patient would benefit from arthroplasty to be able to walk again.  We will plan for this in the near future. [de-identified] : He understands what is involved.  This could have been failure of initial fixation versus his generalized medical condition which also made it more difficult.  In order to try and get him walking so that he can feel better as well become back to more functional life I think an arthroplasty type procedure with removal of hardware would be appropriate.  He will need medical clearance prior to this.  We will discuss this with his family as well.\par \par If imaging was ordered, the patient was told to make an appointment to review findings right after all imaging is completed.\par \par We discussed risks, benefits and alternatives. Rationale of care was reviewed and all questions were answered. Patient (and family) had all questions answered to her degree of the level of satisfaction. Patient (and family) expressed understanding and interest in proceeding with the plan as outlined.\par \par \par \par \par This note was done with a voice recognition transcription software and any typos are related to this rather than medical error. Surgical risks reviewed. Patient (and family) had all questions answered to an agreeable level of satisfaction. Patient (and family) expressed understanding and interest in proceeding with the plan as outlined.  \par

## 2020-10-19 ENCOUNTER — APPOINTMENT (OUTPATIENT)
Dept: ORTHOPEDIC SURGERY | Facility: CLINIC | Age: 80
End: 2020-10-19
Payer: MEDICARE

## 2020-10-19 DIAGNOSIS — S72.001K FRACTURE OF UNSPECIFIED PART OF NECK OF RIGHT FEMUR, SUBSEQUENT ENCOUNTER FOR CLOSED FRACTURE WITH NONUNION: ICD-10-CM

## 2020-10-19 PROCEDURE — 99447 NTRPROF PH1/NTRNET/EHR 11-20: CPT

## 2020-10-20 ENCOUNTER — TRANSCRIPTION ENCOUNTER (OUTPATIENT)
Age: 80
End: 2020-10-20

## 2020-10-20 ENCOUNTER — INPATIENT (INPATIENT)
Facility: HOSPITAL | Age: 80
LOS: 0 days | Discharge: LEFT BEFORE DISCHARGE | DRG: 560 | End: 2020-10-21
Attending: ORTHOPAEDIC SURGERY | Admitting: ORTHOPAEDIC SURGERY
Payer: MEDICARE

## 2020-10-20 VITALS
HEIGHT: 69 IN | WEIGHT: 182.98 LBS | SYSTOLIC BLOOD PRESSURE: 103 MMHG | RESPIRATION RATE: 16 BRPM | DIASTOLIC BLOOD PRESSURE: 67 MMHG | OXYGEN SATURATION: 96 % | TEMPERATURE: 98 F | HEART RATE: 53 BPM

## 2020-10-20 VITALS
RESPIRATION RATE: 17 BRPM | HEART RATE: 61 BPM | OXYGEN SATURATION: 100 % | DIASTOLIC BLOOD PRESSURE: 64 MMHG | TEMPERATURE: 98 F | SYSTOLIC BLOOD PRESSURE: 152 MMHG

## 2020-10-20 DIAGNOSIS — S72.001S FRACTURE OF UNSPECIFIED PART OF NECK OF RIGHT FEMUR, SEQUELA: ICD-10-CM

## 2020-10-20 DIAGNOSIS — Z98.890 OTHER SPECIFIED POSTPROCEDURAL STATES: Chronic | ICD-10-CM

## 2020-10-20 LAB
ALBUMIN SERPL ELPH-MCNC: 3.2 G/DL — LOW (ref 3.3–5)
ALP SERPL-CCNC: 157 U/L — HIGH (ref 40–120)
ALT FLD-CCNC: 13 U/L — SIGNIFICANT CHANGE UP (ref 10–45)
ANION GAP SERPL CALC-SCNC: 12 MMOL/L — SIGNIFICANT CHANGE UP (ref 5–17)
APTT BLD: 31.3 SEC — SIGNIFICANT CHANGE UP (ref 27.5–35.5)
AST SERPL-CCNC: 23 U/L — SIGNIFICANT CHANGE UP (ref 10–40)
BASOPHILS # BLD AUTO: 0.03 K/UL — SIGNIFICANT CHANGE UP (ref 0–0.2)
BASOPHILS NFR BLD AUTO: 0.6 % — SIGNIFICANT CHANGE UP (ref 0–2)
BILIRUB SERPL-MCNC: 0.5 MG/DL — SIGNIFICANT CHANGE UP (ref 0.2–1.2)
BUN SERPL-MCNC: 18 MG/DL — SIGNIFICANT CHANGE UP (ref 7–23)
CALCIUM SERPL-MCNC: 9.3 MG/DL — SIGNIFICANT CHANGE UP (ref 8.4–10.5)
CHLORIDE SERPL-SCNC: 100 MMOL/L — SIGNIFICANT CHANGE UP (ref 96–108)
CO2 SERPL-SCNC: 26 MMOL/L — SIGNIFICANT CHANGE UP (ref 22–31)
CREAT SERPL-MCNC: 1.46 MG/DL — HIGH (ref 0.5–1.3)
CRP SERPL-MCNC: 3.59 MG/DL — HIGH (ref 0–0.4)
EOSINOPHIL # BLD AUTO: 0.29 K/UL — SIGNIFICANT CHANGE UP (ref 0–0.5)
EOSINOPHIL NFR BLD AUTO: 5.5 % — SIGNIFICANT CHANGE UP (ref 0–6)
GLUCOSE SERPL-MCNC: 116 MG/DL — HIGH (ref 70–99)
HCT VFR BLD CALC: 38.9 % — LOW (ref 39–50)
HGB BLD-MCNC: 12.2 G/DL — LOW (ref 13–17)
IMM GRANULOCYTES NFR BLD AUTO: 0.8 % — SIGNIFICANT CHANGE UP (ref 0–1.5)
INR BLD: 1.58 RATIO — HIGH (ref 0.88–1.16)
LYMPHOCYTES # BLD AUTO: 1.33 K/UL — SIGNIFICANT CHANGE UP (ref 1–3.3)
LYMPHOCYTES # BLD AUTO: 25.1 % — SIGNIFICANT CHANGE UP (ref 13–44)
MCHC RBC-ENTMCNC: 25.8 PG — LOW (ref 27–34)
MCHC RBC-ENTMCNC: 31.4 GM/DL — LOW (ref 32–36)
MCV RBC AUTO: 82.4 FL — SIGNIFICANT CHANGE UP (ref 80–100)
MONOCYTES # BLD AUTO: 0.59 K/UL — SIGNIFICANT CHANGE UP (ref 0–0.9)
MONOCYTES NFR BLD AUTO: 11.2 % — SIGNIFICANT CHANGE UP (ref 2–14)
NEUTROPHILS # BLD AUTO: 3.01 K/UL — SIGNIFICANT CHANGE UP (ref 1.8–7.4)
NEUTROPHILS NFR BLD AUTO: 56.8 % — SIGNIFICANT CHANGE UP (ref 43–77)
NRBC # BLD: 0 /100 WBCS — SIGNIFICANT CHANGE UP (ref 0–0)
PLATELET # BLD AUTO: 191 K/UL — SIGNIFICANT CHANGE UP (ref 150–400)
POTASSIUM SERPL-MCNC: 4 MMOL/L — SIGNIFICANT CHANGE UP (ref 3.5–5.3)
POTASSIUM SERPL-SCNC: 4 MMOL/L — SIGNIFICANT CHANGE UP (ref 3.5–5.3)
PROT SERPL-MCNC: 6.6 G/DL — SIGNIFICANT CHANGE UP (ref 6–8.3)
PROTHROM AB SERPL-ACNC: 18.6 SEC — HIGH (ref 10.6–13.6)
RBC # BLD: 4.72 M/UL — SIGNIFICANT CHANGE UP (ref 4.2–5.8)
RBC # FLD: 15.3 % — HIGH (ref 10.3–14.5)
SARS-COV-2 RNA SPEC QL NAA+PROBE: SIGNIFICANT CHANGE UP
SODIUM SERPL-SCNC: 138 MMOL/L — SIGNIFICANT CHANGE UP (ref 135–145)
WBC # BLD: 5.29 K/UL — SIGNIFICANT CHANGE UP (ref 3.8–10.5)
WBC # FLD AUTO: 5.29 K/UL — SIGNIFICANT CHANGE UP (ref 3.8–10.5)

## 2020-10-20 PROCEDURE — 85730 THROMBOPLASTIN TIME PARTIAL: CPT

## 2020-10-20 PROCEDURE — 99285 EMERGENCY DEPT VISIT HI MDM: CPT | Mod: CS,GC

## 2020-10-20 PROCEDURE — 85025 COMPLETE CBC W/AUTO DIFF WBC: CPT

## 2020-10-20 PROCEDURE — 73552 X-RAY EXAM OF FEMUR 2/>: CPT | Mod: 26,RT

## 2020-10-20 PROCEDURE — 73502 X-RAY EXAM HIP UNI 2-3 VIEWS: CPT | Mod: 26,RT

## 2020-10-20 PROCEDURE — U0003: CPT

## 2020-10-20 PROCEDURE — 85610 PROTHROMBIN TIME: CPT

## 2020-10-20 PROCEDURE — 72170 X-RAY EXAM OF PELVIS: CPT

## 2020-10-20 PROCEDURE — 86140 C-REACTIVE PROTEIN: CPT

## 2020-10-20 PROCEDURE — 99285 EMERGENCY DEPT VISIT HI MDM: CPT

## 2020-10-20 PROCEDURE — 71045 X-RAY EXAM CHEST 1 VIEW: CPT

## 2020-10-20 PROCEDURE — 80053 COMPREHEN METABOLIC PANEL: CPT

## 2020-10-20 PROCEDURE — 73552 X-RAY EXAM OF FEMUR 2/>: CPT

## 2020-10-20 PROCEDURE — 71045 X-RAY EXAM CHEST 1 VIEW: CPT | Mod: 26

## 2020-10-20 PROCEDURE — 73502 X-RAY EXAM HIP UNI 2-3 VIEWS: CPT

## 2020-10-20 PROCEDURE — 93005 ELECTROCARDIOGRAM TRACING: CPT

## 2020-10-20 RX ORDER — PANTOPRAZOLE SODIUM 20 MG/1
40 TABLET, DELAYED RELEASE ORAL
Refills: 0 | Status: DISCONTINUED | OUTPATIENT
Start: 2020-10-20 | End: 2020-10-21

## 2020-10-20 RX ORDER — OXYCODONE HYDROCHLORIDE 5 MG/1
5 TABLET ORAL EVERY 4 HOURS
Refills: 0 | Status: DISCONTINUED | OUTPATIENT
Start: 2020-10-20 | End: 2020-10-21

## 2020-10-20 RX ORDER — ACETAMINOPHEN 500 MG
650 TABLET ORAL ONCE
Refills: 0 | Status: COMPLETED | OUTPATIENT
Start: 2020-10-20 | End: 2020-10-20

## 2020-10-20 RX ORDER — SENNA PLUS 8.6 MG/1
2 TABLET ORAL AT BEDTIME
Refills: 0 | Status: DISCONTINUED | OUTPATIENT
Start: 2020-10-20 | End: 2020-10-21

## 2020-10-20 RX ORDER — PROPRANOLOL HCL 160 MG
1 CAPSULE, EXTENDED RELEASE 24HR ORAL
Qty: 0 | Refills: 0 | DISCHARGE

## 2020-10-20 RX ORDER — FUROSEMIDE 40 MG
20 TABLET ORAL DAILY
Refills: 0 | Status: DISCONTINUED | OUTPATIENT
Start: 2020-10-20 | End: 2020-10-21

## 2020-10-20 RX ORDER — OXYCODONE HYDROCHLORIDE 5 MG/1
2.5 TABLET ORAL EVERY 4 HOURS
Refills: 0 | Status: DISCONTINUED | OUTPATIENT
Start: 2020-10-20 | End: 2020-10-21

## 2020-10-20 RX ADMIN — Medication 650 MILLIGRAM(S): at 18:32

## 2020-10-20 RX ADMIN — Medication 650 MILLIGRAM(S): at 17:53

## 2020-10-20 RX ADMIN — SENNA PLUS 2 TABLET(S): 8.6 TABLET ORAL at 22:59

## 2020-10-20 NOTE — ED ADULT NURSE NOTE - OBJECTIVE STATEMENT
Pt is an 80y/ male with c/o R arm and leg pain. Pt is a poor historian concerning current medical condition, but is A&Ox3. States he took 2-3 pain killers today but "doesn't remember what they were." Denies any fall or mechanical injury. No swelling, warmth, redness, or tenderness noted on R extremities. No abrasions, lacerations, or bruises noted. Dr. Bell spoke to pt's wife and she states he is here for pre-op for a scheduled procedure on Thursday. Pt has an extensive medical hx. Denies headache, vision changes, chest pain, shortness of breath, abdominal pain, nausea, vomiting, diarrhea, fevers, chills, dysuria, hematuria, recent illness travel or fall.

## 2020-10-20 NOTE — ED PROVIDER NOTE - PROGRESS NOTE DETAILS
After admission to orthopedics they reported that he needs to go to [facility] - orthopedics arranging for transfer from Emergency Department.  Eriberto Bell M.D.

## 2020-10-20 NOTE — DISCHARGE NOTE PROVIDER - HOSPITAL COURSE
80y Male Hx Cirrhosis, HTN s/p R hip IMN for basicervical hip fracture in August 19 2020 presents now with failure of hardware. Patient was found in Dr. Amaya office to have cutout of screw through the femoral head, hip pain and inability to ambulate. Patient was booked for removal of hardware and conversion to total hip arthroplasty on 10/22/2020 at [facility]. Patient was sent to be admitted for preoperative medical optimization due to his cirrhosis. Patient denies radiation of pain. Patient denies numbness/tingling/burning in the RLE. No other bone/joint complaints. Patient inadvertently presented to [facility] ED, however surgery is at [facility]. Transfer to the floor at [facility] has been arranged.

## 2020-10-20 NOTE — ED PROVIDER NOTE - ATTENDING CONTRIBUTION TO CARE
Patient presenting with joint pains, sent by orthopedics for admission for revision of prior surgery, will obtain preop labs/imaging, consult orthopedics for admission.

## 2020-10-20 NOTE — ED PROVIDER NOTE - MUSCULOSKELETAL, MLM
Spine appears normal, range of motion is not limited, pain at the R hip, unable to raise R leg 2/2 pain stength in remaining extremities intact.

## 2020-10-20 NOTE — H&P ADULT - NSICDXPASTMEDICALHX_GEN_ALL_CORE_FT
PAST MEDICAL HISTORY:  Guillain-[place] syndrome     HTN (hypertension)     Liver cirrhosis     Porcelain gallbladder

## 2020-10-20 NOTE — DISCHARGE NOTE PROVIDER - NSDCMRMEDTOKEN_GEN_ALL_CORE_FT
ascorbic acid 500 mg oral tablet: 1 tab(s) orally once a day  calcium-vitamin D 500 mg-200 intl units (5 mcg) oral tablet: 1 tab(s) orally once a day  furosemide 20 mg oral tablet: 1 tab(s) orally once a day  pantoprazole 40 mg oral delayed release tablet: 1 tab(s) orally once a day  propranolol 10 mg oral tablet: 1 tab(s) orally 2 times a day

## 2020-10-20 NOTE — ED ADULT NURSE NOTE - CHIEF COMPLAINT QUOTE
Pt complains of joint pain in right knee and arm. Pt has Hx of joint pain and right femoral neck fracture x 2 months ago. Pt was sent by MD Pt has hip replacement surgery scheduled for Thursday with MD king, Pt was sent in for full hip replacement. Pt A&O x 1 please call family.

## 2020-10-20 NOTE — DISCHARGE NOTE PROVIDER - NSDCCPCAREPLAN_GEN_ALL_CORE_FT
PRINCIPAL DISCHARGE DIAGNOSIS  Diagnosis: Hip fracture, right, sequela  Assessment and Plan of Treatment:

## 2020-10-20 NOTE — ED PROVIDER NOTE - CLINICAL SUMMARY MEDICAL DECISION MAKING FREE TEXT BOX
81yo M numerous comorbidities presenting with complaints of joint pain. sent in for preop testing for surgery with Dr. Foreman in 2 days for hs R hip fracture sustained s/p fall 5weeks ago. will obtain preop labs and discuss with ortho for admission.

## 2020-10-20 NOTE — ED PROVIDER NOTE - NEUROLOGICAL, MLM
Alert and oriented but confused, no focal deficits, unable to raise R leg 2/2 pain stength in remaining extremities intact. no sensory deficits

## 2020-10-20 NOTE — ED ADULT TRIAGE NOTE - CHIEF COMPLAINT QUOTE
Pt complains of joint pain in right knee and arm. Pt has Hx of joint pain. Pt complains of joint pain in right knee and arm. Pt has Hx of joint pain and right femoral neck fracture x 2 months ago. Pt A&O x 1. Pt complains of joint pain in right knee and arm. Pt has Hx of joint pain and right femoral neck fracture x 2 months ago. Pt was sent by MD Pt has hip replacement surgery scheduled for Thursday with MD king, Pt was sent in for full hip replacement. Pt A&O x 1 please call family.

## 2020-10-20 NOTE — ED PROVIDER NOTE - RAPID ASSESSMENT
80y M with PMHx of HTN, CKD3, GERD with Dyspepsia/H. Pylori (10/2019), distant GBS, Decompensated Alcoholic Cirrhosis (now sober, off [facility] list at [facility] as improved, w/ Small Grade 1 Esophogeal Varices, and PSHx of R basicervical FN fracture s/p IMN on 8/19/2020, presents to the ED c/o right arm and right leg pain. Pain is rated 7/10 on severity scale. Pt is a poor historian, information difficult to obtain. Medical and surgical history obtained from chart. Pt took 2-3 pills of painkillers today, unsure of name of medication. Denies falling, fever, ABD pain, vomiting.     **Pt seen in waiting room by Liza CASTRO), documentation completed by Maribell Justin. Pt to be sent to main ED for further evaluation - all orders placed to be followed by MD in the main ED** 80y M with PMHx of HTN, CKD3, GERD with Dyspepsia/H. Pylori (10/2019), distant GBS, Decompensated Alcoholic Cirrhosis (now sober, off transplant list at [facility] as improved, w/ Small Grade 1 Esophogeal Varices, and PSHx of R basicervical FN fracture s/p IMN on 8/19/2020, presents to the ED c/o right arm and right leg pain. Pain is rated 7/10 on severity scale. Pt is a poor historian, information difficult to obtain. Medical and surgical history obtained from chart. Pt took 2-3 pills of painkillers today, unsure of name of medication. Denies falling, fever, ABD pain, vomiting.   Called pt's wife, here for pre-op and planned OR Thursday Dr. Foreman.     **Pt seen in waiting room by Liza CASTRO), documentation completed by Maribell Justin. Pt to be sent to main ED for further evaluation - all orders placed to be followed by MD in the main ED**

## 2020-10-20 NOTE — DISCHARGE NOTE PROVIDER - CARE PROVIDER_API CALL
Trevor Foreman  ORTHOPAEDIC SURGERY  [address]  [place] [address]  Phone: [phone]  Fax: [phone]  Follow Up Time:

## 2020-10-20 NOTE — DISCHARGE NOTE PROVIDER - NSDCFUSCHEDAPPT_GEN_ALL_CORE_FT
KETAN TIPTON ; 10/22/2020 ; [facility] [address]  KETAN TIPTON ; 11/06/2020 ; [facility] Hepatology [address] [place] [address]

## 2020-10-20 NOTE — H&P ADULT - HISTORY OF PRESENT ILLNESS
80y Male Hx Cirrhosis, HTN s/p R hip IMN for basicervical hip fracture in August 19 2020 presents now with failure of hardware. Patient was found in Dr. Amaya office to have cutout of screw through the femoral head, hip pain and inability to ambulate. Patient was booked for removal of hardware and conversion to total hip arthroplasty on 10/22/2020 at [facility]. Patient was sent to be admitted for preoperative medical optimization due to his cirrhosis. Patient denies radiation of pain. Patient denies numbness/tingling/burning in the RLE. No other bone/joint complaints. Patient inadvertadetly presented to [place] ED, however surgery is at [facility]. Transfer to the floor at [facility] has been arranged.    PAST MEDICAL & SURGICAL HISTORY:  Porcelain gallbladder    Liver cirrhosis    Guillain-[place] syndrome    HTN (hypertension)    H/O inguinal hernia repair      MEDICATIONS  (STANDING):    MEDICATIONS  (PRN):    Allergies    No Known Allergies    Intolerances                              12.2   5.29  )-----------( 191      ( 20 Oct 2020 17:23 )             38.9     10-20    138  |  100  |  18  ----------------------------<  116<H>  4.0   |  26  |  1.46<H>    Ca    9.3      20 Oct 2020 17:23    TPro  6.6  /  Alb  3.2<L>  /  TBili  0.5  /  DBili  x   /  AST  23  /  ALT  13  /  AlkPhos  157<H>  10-20    PT/INR - ( 20 Oct 2020 17:23 )   PT: 18.6 sec;   INR: 1.58 ratio         PTT - ( 20 Oct 2020 17:23 )  PTT:31.3 sec    T(C): 36.8 (10-20-20 @ 16:21), Max: 36.8 (10-20-20 @ 16:21)  HR: 50 (10-20-20 @ 17:14) (50 - 53)  BP: 137/68 (10-20-20 @ 17:14) (103/67 - 137/68)  RR: 14 (10-20-20 @ 17:14) (14 - 16)  SpO2: 96% (10-20-20 @ 17:14) (96% - 96%)  Wt(kg): --    PE   RLE:  Skin intact; No ecchymosis/soft tissue swelling  Compartments soft; + TTP about hip. No TTP to knee/leg/ankle/foot   ROM lmited 2/2 pain   Unable to SLR; + Log Roll/Heel Strike  Motor intact GS/TA/FHL/EHL  SILT L2-S1  DP/PT pulses 2+    LLE/BUE:   No bony TTP; Good ROM w/o pain; Exam Unremarkable    Imaging:  XR demonstrating R hip IMN with cutout off the screw through the femoral head.    80y Male with failure of R hip IMN booked for PADMINI and conversion to SUNDEEP with Dr. Foreman at [facility]  - Plan for OR at [facility] 10/22/20  - Transfer to [facility] floor has been arranged, awaiting room  - Pain control  - NWB RLE  - DVT ppx  - CBC/BMP/Coags/UA/T+S x2  - EKG/CXR  - Hepatology/GI Dr. Mcgraw is aware and will see patient for preoperative clearance in the morning

## 2020-10-20 NOTE — ED PROVIDER NOTE - OBJECTIVE STATEMENT
81yo M Hx HTN, CKD3, GERD with dyspepsia/H. pylori, decompensated alcoholic cirrhosis (no sober 81yo M Hx HTN, CKD3, GERD with dyspepsia/H. pylori, decompensated alcoholic cirrhosis (no sober now) presenting with complaints of joint pain. pt reports that he has generalized pain in all of his joints, states that it is from his arthritis. states that he know that his R hip is bad and that he has fallen in the past. pt is very confused, states that he has problems with his memory. discussed with patients wife, pt fell 5 weeks ago and fractured his R hip. having surgery with Dr. king in 2 days, wanted him to come earlier given his history of a high risk pt for preop testing. denies any f/c, cp, sob, abd pain n/v.

## 2020-10-20 NOTE — ED ADULT NURSE NOTE - NSIMPLEMENTINTERV_GEN_ALL_ED
Implemented All Fall with Harm Risk Interventions:  [place] to call system. Call bell, personal items and telephone within reach. Instruct patient to call for assistance. Room bathroom lighting operational. Non-slip footwear when patient is off stretcher. Physically safe environment: no spills, clutter or unnecessary equipment. Stretcher in lowest position, wheels locked, appropriate side rails in place. Provide visual cue, wrist band, yellow gown, etc. Monitor gait and stability. Monitor for mental status changes and reorient to person, place, and time. Review medications for side effects contributing to fall risk. Reinforce activity limits and safety measures with patient and family. Provide visual clues: red socks.

## 2020-10-21 ENCOUNTER — INPATIENT (INPATIENT)
Facility: HOSPITAL | Age: 80
LOS: 2 days | Discharge: NURSING, REHAB OR LONG-TERM CARE | End: 2020-10-24
Attending: ORTHOPAEDIC SURGERY | Admitting: ORTHOPAEDIC SURGERY
Payer: MEDICARE

## 2020-10-21 ENCOUNTER — TRANSCRIPTION ENCOUNTER (OUTPATIENT)
Age: 80
End: 2020-10-21

## 2020-10-21 VITALS
TEMPERATURE: 98 F | RESPIRATION RATE: 17 BRPM | DIASTOLIC BLOOD PRESSURE: 57 MMHG | HEART RATE: 55 BPM | SYSTOLIC BLOOD PRESSURE: 157 MMHG | OXYGEN SATURATION: 100 %

## 2020-10-21 DIAGNOSIS — Z01.818 ENCOUNTER FOR OTHER PREPROCEDURAL EXAMINATION: ICD-10-CM

## 2020-10-21 DIAGNOSIS — S72.90XA UNSPECIFIED FRACTURE OF UNSPECIFIED FEMUR, INITIAL ENCOUNTER FOR CLOSED FRACTURE: ICD-10-CM

## 2020-10-21 DIAGNOSIS — N18.30 CHRONIC KIDNEY DISEASE, STAGE 3 UNSPECIFIED: ICD-10-CM

## 2020-10-21 DIAGNOSIS — Z29.9 ENCOUNTER FOR PROPHYLACTIC MEASURES, UNSPECIFIED: ICD-10-CM

## 2020-10-21 DIAGNOSIS — N39.0 URINARY TRACT INFECTION, SITE NOT SPECIFIED: ICD-10-CM

## 2020-10-21 DIAGNOSIS — R39.15 URGENCY OF URINATION: ICD-10-CM

## 2020-10-21 DIAGNOSIS — K74.60 UNSPECIFIED CIRRHOSIS OF LIVER: ICD-10-CM

## 2020-10-21 DIAGNOSIS — Z98.890 OTHER SPECIFIED POSTPROCEDURAL STATES: Chronic | ICD-10-CM

## 2020-10-21 DIAGNOSIS — I10 ESSENTIAL (PRIMARY) HYPERTENSION: ICD-10-CM

## 2020-10-21 DIAGNOSIS — M25.551 PAIN IN RIGHT HIP: ICD-10-CM

## 2020-10-21 DIAGNOSIS — R79.1 ABNORMAL COAGULATION PROFILE: ICD-10-CM

## 2020-10-21 DIAGNOSIS — R82.71 BACTERIURIA: ICD-10-CM

## 2020-10-21 DIAGNOSIS — I45.10 UNSPECIFIED RIGHT BUNDLE-BRANCH BLOCK: ICD-10-CM
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
## 2020-10-21 LAB
ANION GAP SERPL CALC-SCNC: 11 MMO/L — SIGNIFICANT CHANGE UP (ref 7–14)
APPEARANCE UR: SIGNIFICANT CHANGE UP
APTT BLD: 31.2 SEC — SIGNIFICANT CHANGE UP (ref 27–36.3)
BACTERIA # UR AUTO: HIGH
BILIRUB UR-MCNC: NEGATIVE — SIGNIFICANT CHANGE UP
BLOOD UR QL VISUAL: SIGNIFICANT CHANGE UP
BUN SERPL-MCNC: 16 MG/DL — SIGNIFICANT CHANGE UP (ref 7–23)
CALCIUM SERPL-MCNC: 9.2 MG/DL — SIGNIFICANT CHANGE UP (ref 8.4–10.5)
CHLORIDE SERPL-SCNC: 100 MMOL/L — SIGNIFICANT CHANGE UP (ref 98–107)
CO2 SERPL-SCNC: 25 MMOL/L — SIGNIFICANT CHANGE UP (ref 22–31)
COLOR SPEC: SIGNIFICANT CHANGE UP
CREAT SERPL-MCNC: 1.33 MG/DL — HIGH (ref 0.5–1.3)
GLUCOSE SERPL-MCNC: 94 MG/DL — SIGNIFICANT CHANGE UP (ref 70–99)
GLUCOSE UR-MCNC: NEGATIVE — SIGNIFICANT CHANGE UP
HCT VFR BLD CALC: 39.8 % — SIGNIFICANT CHANGE UP (ref 39–50)
HGB BLD-MCNC: 12 G/DL — LOW (ref 13–17)
HYALINE CASTS # UR AUTO: NEGATIVE — SIGNIFICANT CHANGE UP
INR BLD: 1.54 — HIGH (ref 0.88–1.16)
INR BLD: 1.63 — HIGH (ref 0.88–1.16)
KETONES UR-MCNC: NEGATIVE — SIGNIFICANT CHANGE UP
LEUKOCYTE ESTERASE UR-ACNC: SIGNIFICANT CHANGE UP
MCHC RBC-ENTMCNC: 24.9 PG — LOW (ref 27–34)
MCHC RBC-ENTMCNC: 30.2 % — LOW (ref 32–36)
MCV RBC AUTO: 82.7 FL — SIGNIFICANT CHANGE UP (ref 80–100)
NITRITE UR-MCNC: NEGATIVE — SIGNIFICANT CHANGE UP
NRBC # FLD: 0 K/UL — SIGNIFICANT CHANGE UP (ref 0–0)
PH UR: 6 — SIGNIFICANT CHANGE UP (ref 5–8)
PLATELET # BLD AUTO: 190 K/UL — SIGNIFICANT CHANGE UP (ref 150–400)
PMV BLD: 11.2 FL — SIGNIFICANT CHANGE UP (ref 7–13)
POTASSIUM SERPL-MCNC: 3.9 MMOL/L — SIGNIFICANT CHANGE UP (ref 3.5–5.3)
POTASSIUM SERPL-SCNC: 3.9 MMOL/L — SIGNIFICANT CHANGE UP (ref 3.5–5.3)
PROT UR-MCNC: 10 — SIGNIFICANT CHANGE UP
PROTHROM AB SERPL-ACNC: 17.4 SEC — HIGH (ref 10.6–13.6)
PROTHROM AB SERPL-ACNC: 18.3 SEC — HIGH (ref 10.6–13.6)
RBC # BLD: 4.81 M/UL — SIGNIFICANT CHANGE UP (ref 4.2–5.8)
RBC # FLD: 15.3 % — HIGH (ref 10.3–14.5)
RBC CASTS # UR COMP ASSIST: SIGNIFICANT CHANGE UP (ref 0–?)
SODIUM SERPL-SCNC: 136 MMOL/L — SIGNIFICANT CHANGE UP (ref 135–145)
SP GR SPEC: 1.01 — SIGNIFICANT CHANGE UP (ref 1–1.04)
SQUAMOUS # UR AUTO: SIGNIFICANT CHANGE UP
UROBILINOGEN FLD QL: NORMAL — SIGNIFICANT CHANGE UP
WBC # BLD: 4.84 K/UL — SIGNIFICANT CHANGE UP (ref 3.8–10.5)
WBC # FLD AUTO: 4.84 K/UL — SIGNIFICANT CHANGE UP (ref 3.8–10.5)
WBC UR QL: >50 — HIGH (ref 0–?)

## 2020-10-21 PROCEDURE — 99223 1ST HOSP IP/OBS HIGH 75: CPT | Mod: GC

## 2020-10-21 PROCEDURE — 27132 TOTAL HIP ARTHROPLASTY: CPT | Mod: 58,22,RT

## 2020-10-21 PROCEDURE — 93010 ELECTROCARDIOGRAM REPORT: CPT

## 2020-10-21 PROCEDURE — 12345: CPT | Mod: NC

## 2020-10-21 PROCEDURE — 99223 1ST HOSP IP/OBS HIGH 75: CPT

## 2020-10-21 RX ORDER — PHYTONADIONE (VIT K1) 5 MG
10 TABLET ORAL ONCE
Refills: 0 | Status: COMPLETED | OUTPATIENT
Start: 2020-10-21 | End: 2020-10-21

## 2020-10-21 RX ORDER — HEPARIN SODIUM 5000 [USP'U]/ML
5000 INJECTION INTRAVENOUS; SUBCUTANEOUS EVERY 8 HOURS
Refills: 0 | Status: COMPLETED | OUTPATIENT
Start: 2020-10-21 | End: 2020-10-21

## 2020-10-21 RX ORDER — CEFTRIAXONE 500 MG/1
1000 INJECTION, POWDER, FOR SOLUTION INTRAMUSCULAR; INTRAVENOUS EVERY 24 HOURS
Refills: 0 | Status: DISCONTINUED | OUTPATIENT
Start: 2020-10-21 | End: 2020-10-21

## 2020-10-21 RX ORDER — OXYCODONE HYDROCHLORIDE 5 MG/1
5 TABLET ORAL EVERY 4 HOURS
Refills: 0 | Status: DISCONTINUED | OUTPATIENT
Start: 2020-10-21 | End: 2020-10-24

## 2020-10-21 RX ORDER — MAGNESIUM HYDROXIDE 400 MG/1
30 TABLET, CHEWABLE ORAL DAILY
Refills: 0 | Status: DISCONTINUED | OUTPATIENT
Start: 2020-10-21 | End: 2020-10-24

## 2020-10-21 RX ORDER — SODIUM CHLORIDE 9 MG/ML
1000 INJECTION INTRAMUSCULAR; INTRAVENOUS; SUBCUTANEOUS
Refills: 0 | Status: DISCONTINUED | OUTPATIENT
Start: 2020-10-21 | End: 2020-10-23

## 2020-10-21 RX ORDER — ASCORBIC ACID 60 MG
500 TABLET,CHEWABLE ORAL DAILY
Refills: 0 | Status: DISCONTINUED | OUTPATIENT
Start: 2020-10-21 | End: 2020-10-24

## 2020-10-21 RX ORDER — CHLORHEXIDINE GLUCONATE 213 G/1000ML
1 SOLUTION TOPICAL
Refills: 0 | Status: DISCONTINUED | OUTPATIENT
Start: 2020-10-22 | End: 2020-10-22

## 2020-10-21 RX ORDER — POVIDONE-IODINE 5 %
1 AEROSOL (ML) TOPICAL ONCE
Refills: 0 | Status: COMPLETED | OUTPATIENT
Start: 2020-10-21 | End: 2020-10-22

## 2020-10-21 RX ORDER — ACETAMINOPHEN 500 MG
975 TABLET ORAL EVERY 8 HOURS
Refills: 0 | Status: DISCONTINUED | OUTPATIENT
Start: 2020-10-21 | End: 2020-10-22

## 2020-10-21 RX ORDER — LACTULOSE 10 G/15ML
15 SOLUTION ORAL THREE TIMES A DAY
Refills: 0 | Status: DISCONTINUED | OUTPATIENT
Start: 2020-10-21 | End: 2020-10-24

## 2020-10-21 RX ORDER — CHLORHEXIDINE GLUCONATE 213 G/1000ML
1 SOLUTION TOPICAL DAILY
Refills: 0 | Status: DISCONTINUED | OUTPATIENT
Start: 2020-10-21 | End: 2020-10-21

## 2020-10-21 RX ORDER — OXYCODONE HYDROCHLORIDE 5 MG/1
2.5 TABLET ORAL EVERY 4 HOURS
Refills: 0 | Status: DISCONTINUED | OUTPATIENT
Start: 2020-10-21 | End: 2020-10-24

## 2020-10-21 RX ORDER — SENNA PLUS 8.6 MG/1
2 TABLET ORAL AT BEDTIME
Refills: 0 | Status: DISCONTINUED | OUTPATIENT
Start: 2020-10-21 | End: 2020-10-24

## 2020-10-21 RX ORDER — INFLUENZA VIRUS VACCINE 15; 15; 15; 15 UG/.5ML; UG/.5ML; UG/.5ML; UG/.5ML
0.5 SUSPENSION INTRAMUSCULAR ONCE
Refills: 0 | Status: DISCONTINUED | OUTPATIENT
Start: 2020-10-21 | End: 2020-10-24

## 2020-10-21 RX ORDER — PANTOPRAZOLE SODIUM 20 MG/1
40 TABLET, DELAYED RELEASE ORAL
Refills: 0 | Status: DISCONTINUED | OUTPATIENT
Start: 2020-10-21 | End: 2020-10-24

## 2020-10-21 RX ORDER — FUROSEMIDE 40 MG
20 TABLET ORAL DAILY
Refills: 0 | Status: DISCONTINUED | OUTPATIENT
Start: 2020-10-21 | End: 2020-10-24

## 2020-10-21 RX ORDER — TRAMADOL HYDROCHLORIDE 50 MG/1
50 TABLET ORAL EVERY 8 HOURS
Refills: 0 | Status: DISCONTINUED | OUTPATIENT
Start: 2020-10-21 | End: 2020-10-23

## 2020-10-21 RX ADMIN — HEPARIN SODIUM 5000 UNIT(S): 5000 INJECTION INTRAVENOUS; SUBCUTANEOUS at 17:54

## 2020-10-21 RX ADMIN — Medication 975 MILLIGRAM(S): at 21:51

## 2020-10-21 RX ADMIN — Medication 975 MILLIGRAM(S): at 14:10

## 2020-10-21 RX ADMIN — HEPARIN SODIUM 5000 UNIT(S): 5000 INJECTION INTRAVENOUS; SUBCUTANEOUS at 10:27

## 2020-10-21 RX ADMIN — LACTULOSE 15 GRAM(S): 10 SOLUTION ORAL at 21:51

## 2020-10-21 RX ADMIN — HEPARIN SODIUM 5000 UNIT(S): 5000 INJECTION INTRAVENOUS; SUBCUTANEOUS at 02:07

## 2020-10-21 RX ADMIN — SODIUM CHLORIDE 75 MILLILITER(S): 9 INJECTION INTRAMUSCULAR; INTRAVENOUS; SUBCUTANEOUS at 10:27

## 2020-10-21 RX ADMIN — PANTOPRAZOLE SODIUM 40 MILLIGRAM(S): 20 TABLET, DELAYED RELEASE ORAL at 06:37

## 2020-10-21 RX ADMIN — Medication 500 MILLIGRAM(S): at 14:10

## 2020-10-21 RX ADMIN — Medication 975 MILLIGRAM(S): at 06:37

## 2020-10-21 RX ADMIN — Medication 20 MILLIGRAM(S): at 06:37

## 2020-10-21 RX ADMIN — SODIUM CHLORIDE 75 MILLILITER(S): 9 INJECTION INTRAMUSCULAR; INTRAVENOUS; SUBCUTANEOUS at 21:50

## 2020-10-21 RX ADMIN — Medication 102 MILLIGRAM(S): at 17:54

## 2020-10-21 RX ADMIN — LACTULOSE 15 GRAM(S): 10 SOLUTION ORAL at 14:10

## 2020-10-21 RX ADMIN — CEFTRIAXONE 100 MILLIGRAM(S): 500 INJECTION, POWDER, FOR SOLUTION INTRAMUSCULAR; INTRAVENOUS at 06:37

## 2020-10-21 RX ADMIN — LACTULOSE 15 GRAM(S): 10 SOLUTION ORAL at 06:36

## 2020-10-21 RX ADMIN — Medication 1 TABLET(S): at 14:10

## 2020-10-21 NOTE — CONSULT NOTE ADULT - PROBLEM SELECTOR RECOMMENDATION 3
MELD-Na 16. MELD 15.   - Obtain Hepatology consult. F/u Hepatology recs.  - Esophageal varices: variceal bleed s/p banding (8/2018), last EGD in 4/2020 with trace varices. C/w propranolol 10 mg bid with hold parameter for HR < 60.  - Hepatic encephalopathy: mental status at baseline, no asterixis on exam. C/w lactulose 15 g tid. Check ammonia level in AM.  - Ascites: none. C/w PO Lasix 20 mg daily.   - HCC: Negative CT in 5/2019

## 2020-10-21 NOTE — H&P ADULT - HISTORY OF PRESENT ILLNESS
Orthopaedic Surgery Consult Note    Chief Complaint:    HPI:  80y Male Hx Cirrhosis, HTN s/p R hip IMN for basicervical hip fracture in August 19 2020 presents now with failure of hardware. Patient was found in Dr. Amaya office to have cutout of screw through the femoral head, hip pain and inability to ambulate. Patient was booked for removal of hardware and conversion to total hip arthroplasty on 10/22/2020 at [facility]. Patient was sent to be admitted for preoperative medical optimization due to his cirrhosis. Patient denies radiation of pain. Patient denies numbness/tingling/burning in the RLE. No other bone/joint complaints.    ROS: As documented in HPI, otherwise negative.    PAST MEDICAL & SURGICAL HISTORY:  Porcelain gallbladder    Liver cirrhosis    Guillain-[place] syndrome    HTN (hypertension)    H/O inguinal hernia repair      [] No significant past history as reviewed with the patient and family    MEDICATIONS  (STANDING):  acetaminophen   Tablet .. 975 milliGRAM(s) Oral every 8 hours  ascorbic acid 500 milliGRAM(s) Oral daily  calcium carbonate 1250 mG  + Vitamin D (OsCal 500 + D) 1 Tablet(s) Oral daily  chlorhexidine 2% Cloths 1 Application(s) Topical daily  furosemide    Tablet 20 milliGRAM(s) Oral daily  heparin   Injectable 5000 Unit(s) SubCutaneous every 8 hours  influenza   Vaccine 0.5 milliLiter(s) IntraMuscular once  pantoprazole    Tablet 40 milliGRAM(s) Oral before breakfast  propranolol 10 milliGRAM(s) Oral two times a day  senna 2 Tablet(s) Oral at bedtime  trimethoprim  160 mG/sulfamethoxazole 800 mG 1 Tablet(s) Oral every 12 hours    MEDICATIONS  (PRN):  magnesium hydroxide Suspension 30 milliLiter(s) Oral daily PRN Constipation  oxyCODONE    IR 2.5 milliGRAM(s) Oral every 4 hours PRN Moderate Pain (4 - 6)  oxyCODONE    IR 5 milliGRAM(s) Oral every 4 hours PRN Severe Pain (7 - 10)  traMADol 50 milliGRAM(s) Oral every 8 hours PRN Mild Pain (1 - 3)    Allergies    No Known Allergies    Intolerances        Vital Signs Last 24 Hrs  T(C): 36.9 (21 Oct 2020 00:39), Max: 36.9 (21 Oct 2020 00:39)  T(F): 98.4 (21 Oct 2020 00:39), Max: 98.4 (21 Oct 2020 00:39)  HR: 55 (21 Oct 2020 00:39) (50 - 61)  BP: 157/57 (21 Oct 2020 00:39) (103/67 - 160/65)  BP(mean): --  RR: 17 (21 Oct 2020 00:39) (14 - 17)  SpO2: 100% (21 Oct 2020 00:39) (96% - 100%)      PHYSICAL EXAM:    RLE:  Skin intact; No ecchymosis/soft tissue swelling  Compartments soft; + TTP about hip. No TTP to knee/leg/ankle/foot   ROM lmited 2/2 pain   Unable to SLR; + Log Roll/Heel Strike  Motor intact GS/TA/FHL/EHL  SILT L2-S1  DP/PT pulses 2+                          12.2   5.29  )-----------( 191      ( 20 Oct 2020 17:23 )             38.9     10-20    138  |  100  |  18  ----------------------------<  116<H>  4.0   |  26  |  1.46<H>    Ca    9.3      20 Oct 2020 17:23    TPro  6.6  /  Alb  3.2<L>  /  TBili  0.5  /  DBili  x   /  AST  23  /  ALT  13  /  AlkPhos  157<H>  10-20    PT/INR - ( 20 Oct 2020 17:23 )   PT: 18.6 sec;   INR: 1.58 ratio         PTT - ( 20 Oct 2020 17:23 )  PTT:31.3 sec      IMAGING STUDIES:   demonstrating R hip IMN with cutout off the screw through the femoral head.    80y Male

## 2020-10-21 NOTE — CONSULT NOTE ADULT - PROBLEM SELECTOR RECOMMENDATION 9
2/2 failure of hardware in R hip s/p R hip IMN on 8/19/20.  - Scheduled for OR for removal of failed hardware and conversion to total hip arthroplasty on 10/22/20. Management as per Orthopedics.  - C/w pain control with Tramadol and Oxycodone PRN, but will need to be more cautious with dosage given presence of cirrhosis 2/2 failure of hardware in R hip s/p R hip IMN on 8/19/20.  - Scheduled for OR for removal of failed hardware and conversion to total hip arthroplasty on 10/22/20. Management as per Orthopedics.  - C/w pain control with Tramadol and Oxycodone PRN, but will need to be cautious with dosage given presence of cirrhosis (hepatically dose)

## 2020-10-21 NOTE — PROGRESS NOTE ADULT - PROBLEM SELECTOR PLAN 1
- Pt with no active cardiac conditions, including unstable coronary syndrome/severe angina/recent MI, heart failure, arrhythmias, or structural heart disease. Pt with bradycardia in setting of 1st degree AVB, but pt currently asymptomatic. EKG on admission: Sinus diamante with 1st degree AVB with PACs, rate 55 bpm, AK interval 214 ms. RBBB (not new). TTE on 8/20/20 with normal LVSF and normal RV size and function.   - Pt with RCRI class I (0 risk predictors present), equating to a 0.5% risk of perioperative cardiac complications, including MI, pulmonary edema, cardiac arrest, or complete heart block  - Pt, however, with poor METs (<4) given limited mobility 2/2 ongoing R hip pain.   - As a result, pt likely at increased cardiac risk for an intermediate-risk procedure, but pt does not require any additional cardiac workup prior to the OR at this time.

## 2020-10-21 NOTE — H&P ADULT - ASSESSMENT
80y Male with failure of R hip IMN booked for PADMINI and conversion to SUNDEEP on 10/22  - consent  - Pain control  - NWB RLE  - DVT ppx - SQH x3 doses, will hold at least 6 hours prior to OR on 10/22  - CBC/BMP/Coags/UA/T+S x2  - EKG/CXR  - medical clearance pending  - Hepatology/GI Dr. Mcgrwa is aware and will see patient for preoperative clearance in the morning   - NPO past midnight, IVF while NPO

## 2020-10-21 NOTE — CONSULT NOTE ADULT - SUBJECTIVE AND OBJECTIVE BOX
Chief Complaint:  Patient is a 80y old  Male who presents with a chief complaint of right hip fx (21 Oct 2020 06:12)      HPI:  81 yo M w/ multiple medical comorbidities including decompensated EtOH cirrhosis admitted for hip replacement.   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 pylori infection s/p triple therapy (10/2019), pancreatitis 2/2 choledocholithiasis s/p ERCP with stone extraction and plastic stent placement (2019, stent now removed), hematochezia 2/2 colonic AVMs s/p cauterization and hemorrhoids (2019), melena 2/2 duodenal ulcers s/p APC (2020), and recent admission from -20 for a R basicervical femoral neck fracture s/p R hip IMN (20) presents with failure of hardware in R hip. Pt was discharged to [facility] following his hospitalization in August, but pt was unable to participate much with physical therapy, as he continued to experience considerable pain in his R hip and difficulty with ambulation as a result of the pain. Pt saw his orthopedic surgeon, Dr. Trevor Foreman, in October, with imaging confirming cutout of the screw through the femoral head resulting in complete nonhealing of his R hip fracture. Pt is now booked for the OR for removal of failed hardware and conversion to total hip arthroplasty on 10/22/20.    Regarding cirrhosis history. EtOH cirrhosis, previously listed for transplant at [facility] (off list as improved), relapsed 2018 and hospitalized w/ bleeding esophageal variceas, banding. Also found to have porcelain gallbladder, liver lesion of unclear etiology, and an SMA stenosis. Hospitalized again 10/2019 for cholelithiasis s/p ERCP and stone extraction, course complicated by hematochezia (likely from colonic AVM's which were APC's. Hospitalized 2020 for melena, s/p EGD/colon, notable for small varices, PD stent removal, colonic AVM's s/p APC, rectal varices (nonbleeding). Last seen hepatology 2020, at which time     Allergies:  No Known Allergies      Home Medications:    Hospital Medications:  acetaminophen   Tablet .. 975 milliGRAM(s) Oral every 8 hours  ascorbic acid 500 milliGRAM(s) Oral daily  calcium carbonate 1250 mG  + Vitamin D (OsCal 500 + D) 1 Tablet(s) Oral daily  chlorhexidine 2% Cloths 1 Application(s) Topical <User Schedule>  furosemide    Tablet 20 milliGRAM(s) Oral daily  heparin   Injectable 5000 Unit(s) SubCutaneous every 8 hours  influenza   Vaccine 0.5 milliLiter(s) IntraMuscular once  lactulose Syrup 15 Gram(s) Oral three times a day  magnesium hydroxide Suspension 30 milliLiter(s) Oral daily PRN  oxyCODONE    IR 2.5 milliGRAM(s) Oral every 4 hours PRN  oxyCODONE    IR 5 milliGRAM(s) Oral every 4 hours PRN  pantoprazole    Tablet 40 milliGRAM(s) Oral before breakfast  povidone iodine 5% Nasal Swab 1 Application(s) Both Nostrils once  propranolol 10 milliGRAM(s) Oral two times a day  senna 2 Tablet(s) Oral at bedtime  sodium chloride 0.9%. 1000 milliLiter(s) IV Continuous <Continuous>  traMADol 50 milliGRAM(s) Oral every 8 hours PRN      PMHX/PSHX:  Porcelain gallbladder    Liver cirrhosis    Guillain-Johnston syndrome    HTN (hypertension)    H/O inguinal hernia repair        Family history:  Family history of ovarian cancer (Sibling)        Denies family history of colon cancer/polyps, stomach cancer/polyps, pancreatic cancer/masses, liver cancer/disease, ovarian cancer and endometrial cancer.    Social History:   Tob: Denies  EtOH: Denies  Illicit Drugs: Denies    ROS:     General:  No wt loss, fevers, chills, night sweats, fatigue  Eyes:  Good vision, no reported pain  ENT:  No sore throat, pain, runny nose, dysphagia  CV:  No pain, palpitations, hypo/hypertension  Pulm:  No dyspnea, cough, tachypnea, wheezing  GI:  No pain, No nausea, No vomiting, No diarrhea, No constipation, No weight loss, No fever, No pruritis, No rectal bleeding, No tarry stools, No dysphagia,  :  No pain, bleeding, incontinence, nocturia  Muscle:  No pain, weakness  Neuro:  No weakness, tingling, memory problems  Psych:  No fatigue, insomnia, mood problems, depression  Endocrine:  No polyuria, polydipsia, cold/heat intolerance  Heme:  No petechiae, ecchymosis, easy bruisability  Skin:  No rash, tattoos, scars, edema    PHYSICAL EXAM:     GENERAL:  No acute distress  HEENT:  Normocephalic/atraumatic, no scleral icterus  CHEST:  Clear to auscultation bilaterally, no wheezes/rales/ronchi, no accessory muscle use  HEART:  Regular rate and rhythm, no murmurs/rubs/gallops  ABDOMEN:  Soft, non-tender, non-distended, normoactive bowel sounds,  no masses, no hepato-splenomegaly, no signs of chronic liver disease  EXTREMITIES: No cyanosis, clubbing, or edema  SKIN:  No rash/erythema/ecchymoses/petechiae/wounds/abscess/warm/dry  NEURO:  Alert and oriented x 3, no asterixis    Vital Signs:  Vital Signs Last 24 Hrs  T(C): 36.2 (21 Oct 2020 14:06), Max: 36.9 (21 Oct 2020 00:39)  T(F): 97.2 (21 Oct 2020 14:06), Max: 98.4 (21 Oct 2020 00:39)  HR: 61 (21 Oct 2020 14:06) (50 - 61)  BP: 116/51 (21 Oct 2020 14:06) (103/67 - 160/65)  BP(mean): --  RR: 18 (21 Oct 2020 14:06) (14 - 18)  SpO2: 97% (21 Oct 2020 14:06) (96% - 100%)  Daily Height in cm: 172.72 (21 Oct 2020 14:06)    Daily     LABS:                        12.0   4.84  )-----------( 190      ( 21 Oct 2020 05:52 )             39.8     Mean Cell Volume: 82.7 fL (10-21-20 @ 05:52)    10-21    136  |  100  |  16  ----------------------------<  94  3.9   |  25  |  1.33<H>    Ca    9.2      21 Oct 2020 05:52    TPro  6.6  /  Alb  3.2<L>  /  TBili  0.5  /  DBili  x   /  AST  23  /  ALT  13  /  AlkPhos  157<H>  1020    LIVER FUNCTIONS - ( 20 Oct 2020 17:23 )  Alb: 3.2 g/dL / Pro: 6.6 g/dL / ALK PHOS: 157 U/L / ALT: 13 U/L / AST: 23 U/L / GGT: x           PT/INR - ( 21 Oct 2020 05:52 )   PT: 17.4 SEC;   INR: 1.54          PTT - ( 21 Oct 2020 05:52 )  PTT:31.2 SEC  Urinalysis Basic - ( 21 Oct 2020 06:49 )    Color: LIGHT YELLOW / Appearance: Lt TURBID / S.006 / pH: 6.0  Gluc: NEGATIVE / Ketone: NEGATIVE  / Bili: NEGATIVE / Urobili: NORMAL   Blood: TRACE / Protein: 10 / Nitrite: NEGATIVE   Leuk Esterase: LARGE / RBC: 0-2 / WBC >50   Sq Epi: OCC / Non Sq Epi: x / Bacteria: MANY                              12.0   4.84  )-----------( 190      ( 21 Oct 2020 05:52 )             39.8                         12.2   5.29  )-----------( 191      ( 20 Oct 2020 17:23 )             38.9       Imaging:           Chief Complaint:  Patient is an 80y old  Male who presents with a chief complaint of right hip fx (21 Oct 2020 06:12)      HPI:  81 yo M w/ multiple medical comorbidities including decompensated EtOH cirrhosis admitted for hip replacement.   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 pylori infection s/p triple therapy (10/2019), pancreatitis 2/2 choledocholithiasis s/p ERCP with stone extraction and plastic stent placement (2019, stent now removed), hematochezia 2/2 colonic AVMs s/p cauterization and hemorrhoids (2019), melena 2/2 duodenal ulcers s/p APC (2020), and recent admission from -20 for a R basicervical femoral neck fracture s/p R hip IMN (20) presents with failure of hardware in R hip. Pt was discharged to [facility] following his hospitalization in August, but pt was unable to participate much with physical therapy, as he continued to experience considerable pain in his R hip and difficulty with ambulation as a result of the pain. Pt saw his orthopedic surgeon, Dr. Trevor Foreman, in October, with imaging confirming cutout of the screw through the femoral head resulting in complete nonhealing of his R hip fracture. Pt is now booked for the OR for removal of failed hardware and conversion to total hip arthroplasty on 10/22/20.    Regarding cirrhosis history. EtOH cirrhosis, previously listed for transplant at [facility] (off list as improved), relapsed 2018 and hospitalized w/ bleeding esophageal variceas, banding. Also found to have porcelain gallbladder, liver lesion of unclear etiology, and an SMA stenosis. Hospitalized again 10/2019 for cholelithiasis s/p ERCP and stone extraction, course complicated by hematochezia (likely from colonic AVM's which were APC's. Hospitalized 2020 for melena, s/p EGD/colon, notable for small varices, PD stent removal, colonic AVM's s/p APC, rectal varices (nonbleeding). Last seen hepatology 2020, at which time     Allergies:  No Known Allergies      Home Medications:    Hospital Medications:  acetaminophen   Tablet .. 975 milliGRAM(s) Oral every 8 hours  ascorbic acid 500 milliGRAM(s) Oral daily  calcium carbonate 1250 mG  + Vitamin D (OsCal 500 + D) 1 Tablet(s) Oral daily  chlorhexidine 2% Cloths 1 Application(s) Topical <User Schedule>  furosemide    Tablet 20 milliGRAM(s) Oral daily  heparin   Injectable 5000 Unit(s) SubCutaneous every 8 hours  influenza   Vaccine 0.5 milliLiter(s) IntraMuscular once  lactulose Syrup 15 Gram(s) Oral three times a day  magnesium hydroxide Suspension 30 milliLiter(s) Oral daily PRN  oxyCODONE    IR 2.5 milliGRAM(s) Oral every 4 hours PRN  oxyCODONE    IR 5 milliGRAM(s) Oral every 4 hours PRN  pantoprazole    Tablet 40 milliGRAM(s) Oral before breakfast  povidone iodine 5% Nasal Swab 1 Application(s) Both Nostrils once  propranolol 10 milliGRAM(s) Oral two times a day  senna 2 Tablet(s) Oral at bedtime  sodium chloride 0.9%. 1000 milliLiter(s) IV Continuous <Continuous>  traMADol 50 milliGRAM(s) Oral every 8 hours PRN      PMHX/PSHX:  Porcelain gallbladder    Liver cirrhosis    Guillain-Alvord syndrome    HTN (hypertension)    H/O inguinal hernia repair        Family history:  Family history of ovarian cancer (Sibling)        Denies family history of colon cancer/polyps, stomach cancer/polyps, pancreatic cancer/masses, liver cancer/disease, ovarian cancer and endometrial cancer.    Social History:   Tob: Denies  EtOH: Denies  Illicit Drugs: Denies    ROS:     General:  No wt loss, fevers, chills, night sweats, fatigue  Eyes:  Good vision, no reported pain  ENT:  No sore throat, pain, runny nose, dysphagia  CV:  No pain, palpitations, hypo/hypertension  Pulm:  No dyspnea, cough, tachypnea, wheezing  GI:  No pain, No nausea, No vomiting, No diarrhea, No constipation, No weight loss, No fever, No pruritis, No rectal bleeding, No tarry stools, No dysphagia,  :  No pain, bleeding, incontinence, nocturia  Muscle:  No pain, weakness  Neuro:  No weakness, tingling, memory problems  Psych:  No fatigue, insomnia, mood problems, depression  Endocrine:  No polyuria, polydipsia, cold/heat intolerance  Heme:  No petechiae, ecchymosis, easy bruisability  Skin:  No rash, tattoos, scars, edema    PHYSICAL EXAM:     GENERAL:  No acute distress  HEENT:  Normocephalic/atraumatic, no scleral icterus  CHEST:  Clear to auscultation bilaterally, no wheezes/rales/ronchi, no accessory muscle use  HEART:  Regular rate and rhythm, no murmurs/rubs/gallops  ABDOMEN:  Soft, non-tender, non-distended, normoactive bowel sounds,  no masses, no hepato-splenomegaly, no signs of chronic liver disease  EXTREMITIES: No cyanosis, clubbing, or edema  SKIN:  No rash/erythema/ecchymoses/petechiae/wounds/abscess/warm/dry  NEURO:  Alert and oriented x 3, no asterixis    Vital Signs:  Vital Signs Last 24 Hrs  T(C): 36.2 (21 Oct 2020 14:06), Max: 36.9 (21 Oct 2020 00:39)  T(F): 97.2 (21 Oct 2020 14:06), Max: 98.4 (21 Oct 2020 00:39)  HR: 61 (21 Oct 2020 14:06) (50 - 61)  BP: 116/51 (21 Oct 2020 14:06) (103/67 - 160/65)  BP(mean): --  RR: 18 (21 Oct 2020 14:06) (14 - 18)  SpO2: 97% (21 Oct 2020 14:06) (96% - 100%)  Daily Height in cm: 172.72 (21 Oct 2020 14:06)    Daily     LABS:                        12.0   4.84  )-----------( 190      ( 21 Oct 2020 05:52 )             39.8     Mean Cell Volume: 82.7 fL (10-21-20 @ 05:52)    10-21    136  |  100  |  16  ----------------------------<  94  3.9   |  25  |  1.33<H>    Ca    9.2      21 Oct 2020 05:52    TPro  6.6  /  Alb  3.2<L>  /  TBili  0.5  /  DBili  x   /  AST  23  /  ALT  13  /  AlkPhos  157<H>  1020    LIVER FUNCTIONS - ( 20 Oct 2020 17:23 )  Alb: 3.2 g/dL / Pro: 6.6 g/dL / ALK PHOS: 157 U/L / ALT: 13 U/L / AST: 23 U/L / GGT: x           PT/INR - ( 21 Oct 2020 05:52 )   PT: 17.4 SEC;   INR: 1.54          PTT - ( 21 Oct 2020 05:52 )  PTT:31.2 SEC  Urinalysis Basic - ( 21 Oct 2020 06:49 )    Color: LIGHT YELLOW / Appearance: Lt TURBID / S.006 / pH: 6.0  Gluc: NEGATIVE / Ketone: NEGATIVE  / Bili: NEGATIVE / Urobili: NORMAL   Blood: TRACE / Protein: 10 / Nitrite: NEGATIVE   Leuk Esterase: LARGE / RBC: 0-2 / WBC >50   Sq Epi: OCC / Non Sq Epi: x / Bacteria: MANY                              12.0   4.84  )-----------( 190      ( 21 Oct 2020 05:52 )             39.8                         12.2   5.29  )-----------( 191      ( 20 Oct 2020 17:23 )             38.9       Imaging:

## 2020-10-21 NOTE — CONSULT NOTE ADULT - PROBLEM SELECTOR RECOMMENDATION 8
Pt with urinary urgency overnight but no complaints of dysuria.  - Check UA and urine cx. If positive, can start ceftriaxone 1 g q24hrs.

## 2020-10-21 NOTE — PROGRESS NOTE ADULT - ASSESSMENT
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 pylori infection s/p triple therapy (10/2019), pancreatitis 2/2 choledocholithiasis s/p ERCP with stone extraction and plastic stent placement (11/2019, stent now removed), hematochezia 2/2 colonic AVMs s/p cauterization and hemorrhoids (11/2019), melena 2/2 duodenal ulcers s/p APC (4/2020), and recent admission from 8/19-8/24/20 for a R basicervical femoral neck fracture s/p R hip IMN (8/19/20) admitted for right proximal femur nonunion pending OR tomorrow.

## 2020-10-21 NOTE — CONSULT NOTE ADULT - PROBLEM SELECTOR RECOMMENDATION 7
INR 1.58 on admission. Given presence of cirrhosis, pt likely with risks of hypo- and hypercoagulability, and elevated INR does not necessarily correlate with bleeding risk.   - Can give PO vitamin K to lower INR to <1.5 if pt with poor nutritional status. However, would touch base with Hepatology before giving PO vitamin K.  - Monitor coags Advancement Flap (Double) Text: The defect edges were debeveled with a #15 scalpel blade.  Given the location of the defect and the proximity to free margins a double advancement flap was deemed most appropriate.  Using a sterile surgical marker, the appropriate advancement flaps were drawn incorporating the defect and placing the expected incisions within the relaxed skin tension lines where possible.    The area thus outlined was incised deep to adipose tissue with a #15 scalpel blade.  The skin margins were undermined to an appropriate distance in all directions utilizing iris scissors.

## 2020-10-21 NOTE — PROGRESS NOTE ADULT - PROBLEM SELECTOR PLAN 3
- MELD-Na 16, compensated at this time  - Esophageal varices: variceal bleed s/p banding (8/2018), last EGD in 4/2020 with trace varices. C/w propanolol with hold parameters.   - Hepatic encephalopathy: mental status at baseline, no asterixis on exam. C/w lactulose 15 g tid.  - Ascites: none. C/w PO Lasix 20 mg daily.   - HCC: Negative CT in 5/2019.  - f/u hepatology recs

## 2020-10-21 NOTE — PROGRESS NOTE ADULT - PROBLEM SELECTOR PLAN 4
- INR 1.54. Given presence of cirrhosis, pt likely with risks of hypo- and hypercoagulability, and elevated INR does not necessarily correlate with bleeding risk.   - Can give PO vitamin K to lower INR to <1.5 if pt with poor nutritional status. However, would touch base with Hepatology before giving PO vitamin K.  - Monitor coags.

## 2020-10-21 NOTE — PROGRESS NOTE ADULT - PROBLEM SELECTOR PLAN 5
- patient with urinary urgency   - UA positive, await UCx  - c/w CTX, will transition to PO pending cultures, abx duration for 7 days for male UTI - UA positive, patient asymptomatic (denies dysuria, urgency, change in frequency, and is currently afebrile without leukocytosis)  - no indication to treat asymptomatic bacteriuria at this time, continue to monitor, received CTX x1 on admission, d/c further doses, preop abx per ortho  - of note patient has hx of ESBL E.coli UTI, should patient develop urinary/infectious symptoms, check UA/UCx, and would need empiric carbapenem for ESBL coverage

## 2020-10-21 NOTE — CONSULT NOTE ADULT - ASSESSMENT
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 pylori infection s/p triple therapy (10/2019), pancreatitis 2/2 choledocholithiasis s/p ERCP with stone extraction and plastic stent placement (11/2019, stent now removed), hematochezia 2/2 colonic AVMs s/p cauterization and hemorrhoids (11/2019), melena 2/2 duodenal ulcers s/p APC (4/2020), and recent admission from 8/19-8/24/20 for a R basicervical femoral neck fracture s/p R hip IMN (8/19/20) presents with failure of hardware in R hip. 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 pylori infection s/p triple therapy (10/2019), pancreatitis 2/2 choledocholithiasis s/p ERCP with stone extraction and plastic stent placement (11/2019, stent now removed), hematochezia 2/2 colonic AVMs s/p cauterization and hemorrhoids (11/2019), melena 2/2 duodenal ulcers s/p APC (4/2020), and recent admission from 8/19-8/24/20 for a R basicervical femoral neck fracture s/p R hip IMN (8/19/20) presents with failure of hardware in R hip.

## 2020-10-21 NOTE — PROGRESS NOTE ADULT - PROBLEM SELECTOR PLAN 6
- SCr. 1.46 on admission, baseline between 1.1 and 1.3.   - Avoid NSAIDs and nephrotoxic agents, Renally dose meds  - Monitor Cr and urine output  - If Cr continues to uptrend, hold PO Lasix and check urine lytes.

## 2020-10-21 NOTE — CONSULT NOTE ADULT - ATTENDING COMMENTS
Patient was seen and examined with hepatology team on rounds on 10/21.2020.   An 80 year old man w/ multiple medical comorbidities of HTN, CKD, RBBB, bilateral knee OA, GBS, porcelain GB, s/p H. pylori treatment, choledocholithiasis s/p ERCP with stent placement and removal, pancreatitis, EV bleeding post banding,  rectal varices, colonic AVMs, DU post APC, right femoral neck fracture failed right hip hardware with non healing, and alcoholic cirrhosis s/p EV bleeding and banding, was seen for clearance regarding total hip replacement.   Patient is awake, alert and oriented x3, no asterixis, no ascites.  MELD-Na of 16, and Child A with score of 6.   He has increased risk for surgery from cirrhosis, per [facility] calculator for perioperative mortality, 7-day mortality is about 2%, 30-day mortality about 7%  Will recommend- maintain hemodynamic stability perioperatively, optimize operative and anesthesia time, and minimize bleeding and blood loss to avoid hepatic decompensation or hepatic failure, trend liver tests, INR and daily MELD-Na, avoid hepatotoxic agents. Patient was seen and examined with hepatology team on rounds on 10/21.2020.   An 80 year old man w/ multiple medical comorbidities of HTN, CKD, RBBB, bilateral knee OA, GBS, porcelain GB, s/p H. pylori treatment, choledocholithiasis s/p ERCP with stent placement and removal, pancreatitis, EV bleeding post banding,  rectal varices, colonic AVMs, DU post APC, right femoral neck fracture failed right hip hardware with non healing, and alcoholic cirrhosis s/p EV bleeding and banding, was seen for clearance regarding total hip replacement.   Patient is awake, alert and oriented x3, no asterixis, no ascites.  MELD-Na of 16, and Child A with score of 6.   He has increased risk for surgery from cirrhosis, per [facility] calculator postoperative mortality of 7-day is about 2%, 30-day mortality about 7%  Will recommend- maintain hemodynamic stability perioperatively, optimize operative and anesthesia time, and minimize bleeding and blood loss to avoid hepatic decompensation or hepatic failure, trend liver tests, INR and daily MELD-Na, avoid hepatotoxic agents. Patient was seen and examined with hepatology team on rounds on 10/21.2020.   An 80 year old man w/ multiple medical comorbidities of HTN, CKD, RBBB, bilateral knee OA, GBS, porcelain GB, s/p H. pylori treatment, choledocholithiasis s/p ERCP with stent placement and removal, pancreatitis, EV bleeding post banding,  rectal varices, colonic AVMs, DU post APC, right femoral neck fracture failed right hip hardware with non healing, and alcoholic cirrhosis s/p EV bleeding and banding, was seen for clearance regarding total hip replacement.   Patient is awake, alert and oriented x3, no asterixis, no ascites.  MELD-Na of 16, and Child A with score of 6.   He has increased risk for surgery from cirrhosis, per [facility] calculator postoperative mortality of 7-day is about 2%, 30-day mortality about 7%  Will recommend- maintain hemodynamic stability perioperatively, optimize operative and anesthesia time, and minimize bleeding and blood loss to prevent hepatic decompensation and  hepatic failure, trend liver tests, INR and daily MELD-Na, avoid hepatotoxic agents.

## 2020-10-21 NOTE — CONSULT NOTE ADULT - PROBLEM SELECTOR RECOMMENDATION 5
Cr 1.46 on admission, baseline appears to fluctuate between 1.1 and 1.3.   - Monitor Cr and urine output  - Avoid NSAIDs and nephrotoxic agents  - Renally dose meds  - If Cr continues to uptrend, hold PO Lasix and check urine lytes

## 2020-10-21 NOTE — CONSULT NOTE ADULT - SUBJECTIVE AND OBJECTIVE BOX
Patient is a 80y old  Male who presents with a chief complaint of R hip pain 2/2 failure of hardware.    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 pylori infection s/p triple therapy (10/2019), pancreatitis 2/2 choledocholithiasis s/p ERCP with stone extraction and plastic stent placement (11/2019, stent now removed), hematochezia 2/2 colonic AVMs s/p cauterization and hemorrhoids (11/2019), melena 2/2 duodenal ulcers s/p APC (4/2020), and recent admission from 8/19-8/24/20 for a R basicervical femoral neck fracture s/p R hip IMN (8/19/20) presents with failure of hardware in R hip. Pt was discharged to [facility] following his hospitalization in August, but pt was unable to participate much with physical therapy, as he continued to experience considerable pain in his R hip and difficulty with ambulation as a result of the pain. Pt saw his orthopedic surgeon, Dr. Trevor Foreman, in October, with imaging confirming cutout of the screw through the femoral head resulting in complete nonhealing of his R hip fracture. Pt is now booked for the OR for removal of failed hardware and conversion to total hip arthroplasty on 10/22/20. Pt was told to come to [facility] 2 days prior to his scheduled surgery for admission for preoperative medical optimization given that he is very high risk of mortality due to his cirrhosis.       REVIEW OF SYSTEMS:  Constitutional: No generalized weakness, fevers, chills, or weight loss  Eyes: No visual changes, double vision, or eye pain  Ears, Nose, Mouth, Throat: No runny nose, sinus pain, ear pain, tinnitus, sore throat, dysphagia, or odynophagia  Cardiovascular: No chest pain, palpitations, or LE edema  Respiratory: No cough, wheezing, hemoptysis, or shortness of breath  Gastrointestinal: No abdominal pain, dysphagia, anorexia, nausea/vomiting, diarrhea/constipation, hematemesis, melena, or BRBPR  Genitourinary: No dysuria, frequency, urgency, or hematuria  Musculoskeletal: No joint pain, joint swelling, or decreased ROM  Skin: No pruritus, rashes, lesions, or wounds  Neurologic:  No seizures, headache, paresthesias, numbness, or limb weakness  Psychiatric: No depression, anxiety, difficulty concentrating, anhedonia, or lack of energy  Endocrine: No heat/cold intolerance, mood swings, sweats, polydipsia, or polyuria  Hematologic/lymphatic: No purpura, petechia, or prolonged or excessive bleeding after dental extraction / injury  Allergic/Immunologic: No anaphylaxis or allergic response to materials, foods, animals    Positives and pertinent negatives noted and all other systems negative.      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 pylori infection s/p triple therapy (10/2019)  Pancreatitis 2/2 choledocholithiasis s/p ERCP with stone extraction and plastic stent placement (11/2019, stent now removed)  Hematochezia 2/2 colonic AVMs s/p cauterization and hemorrhoids (11/2019)  Melena 2/2 duodenal ulcers s/p APC (4/2020  Inguinal hernia repair      MEDICATIONS  (STANDING):  acetaminophen   Tablet .. 975 milliGRAM(s) Oral every 8 hours  ascorbic acid 500 milliGRAM(s) Oral daily  calcium carbonate 1250 mG  + Vitamin D (OsCal 500 + D) 1 Tablet(s) Oral daily  chlorhexidine 2% Cloths 1 Application(s) Topical daily  furosemide    Tablet 20 milliGRAM(s) Oral daily  heparin   Injectable 5000 Unit(s) SubCutaneous every 8 hours  influenza   Vaccine 0.5 milliLiter(s) IntraMuscular once  pantoprazole    Tablet 40 milliGRAM(s) Oral before breakfast  propranolol 10 milliGRAM(s) Oral two times a day  senna 2 Tablet(s) Oral at bedtime  trimethoprim  160 mG/sulfamethoxazole 800 mG 1 Tablet(s) Oral every 12 hours    MEDICATIONS  (PRN):  magnesium hydroxide Suspension 30 milliLiter(s) Oral daily PRN Constipation  oxyCODONE    IR 2.5 milliGRAM(s) Oral every 4 hours PRN Moderate Pain (4 - 6)  oxyCODONE    IR 5 milliGRAM(s) Oral every 4 hours PRN Severe Pain (7 - 10)  traMADol 50 milliGRAM(s) Oral every 8 hours PRN Mild Pain (1 - 3)      ALLERGIES:  No Known Allergies    INTOLERANCES:  No Known Intolerances      FAMILY HISTORY:  Family history of ovarian cancer (Sibling)      SOCIAL HISTORY:  Smoking:  Alcohol:  Illicit Substance:       VITAL SIGNS Last 24 Hrs  T(C): 36.9 (21 Oct 2020 00:39), Max: 36.9 (21 Oct 2020 00:39)  T(F): 98.4 (21 Oct 2020 00:39), Max: 98.4 (21 Oct 2020 00:39)  HR: 55 (21 Oct 2020 00:39) (50 - 61)  BP: 157/57 (21 Oct 2020 00:39) (103/67 - 160/65)  BP(mean): --  RR: 17 (21 Oct 2020 00:39) (14 - 17)  SpO2: 100% (21 Oct 2020 00:39) (96% - 100%)    PHYSICAL EXAM:  Vital Signs Last 24 Hrs  T(C): 36.9 (21 Oct 2020 00:39), Max: 36.9 (21 Oct 2020 00:39)  T(F): 98.4 (21 Oct 2020 00:39), Max: 98.4 (21 Oct 2020 00:39)  HR: 55 (21 Oct 2020 00:39) (50 - 61)  BP: 157/57 (21 Oct 2020 00:39) (103/67 - 160/65)  BP(mean): --  RR: 17 (21 Oct 2020 00:39) (14 - 17)  SpO2: 100% (21 Oct 2020 00:39) (96% - 100%)    PHYSICAL EXAM:  General: Awake and alert.  No acute distress.  Head: Normocephalic, atraumatic.    Eyes: PERRL.  EOMI.  No scleral icterus.  No conjunctival pallor.  Mouth: Moist MM.  No oropharyngeal exudates.    Neck: Supple.  Full range of motion.  No JVD.  No LAD.  No thyromegaly.  Trachea midline.    Heart: RRR.  Normal S1 and S2.  No murmurs, rubs, or gallops.  No LE edema b/l.   Lungs: Nonlabored breathing.  Good inspiratory effort.  CTAB.  No wheezes, crackles, or rhonchi.    Abdomen: BS+, soft, NT/ND.  No hepatomegaly.   Skin: Warm and dry.  No rashes.  Extremities: No cyanosis.  2+ peripheral pulses b/l.  Musculoskeletal: No joint deformities.  No spinal or paraspinal tenderness.  Neuro: A&Ox3.  CN II-XII intact.  5/5 motor strength in UE and LE b/l.  Tactile sensation intact in UE and LE b/l.  Cerebellar function intact as assessed by finger-to-nose test.    RLE:  Skin intact; No ecchymosis/soft tissue swelling  Compartments soft; + TTP about hip. No TTP to knee/leg/ankle/foot   ROM lmited 2/2 pain   Unable to SLR; + Log Roll/Heel Strike  Motor intact GS/TA/FHL/EHL  SILT L2-S1  DP/PT pulses 2+      LABS:                        12.2   5.29  )-----------( 191      ( 20 Oct 2020 17:23 )             38.9     10-20    138  |  100  |  18  ----------------------------<  116<H>  4.0   |  26  |  1.46<H>    Ca    9.3      20 Oct 2020 17:23    TPro  6.6  /  Alb  3.2<L>  /  TBili  0.5  /  DBili  x   /  AST  23  /  ALT  13  /  AlkPhos  157<H>  10-20    PT/INR - ( 20 Oct 2020 17:23 )   PT: 18.6 sec;   INR: 1.58 ratio       PTT - ( 20 Oct 2020 17:23 )  PTT:31.3 sec      I&O's Summary      RADIOLOGY & ADDITIONAL TESTS:    Imaging Personally Reviewed:  [X] YES  [ ] NO    Consultant(s) Notes Reviewed:  [X] YES  [ ] NO    Care Discussed with Consultants/Other Providers [ ] YES  [ ] NO Patient is a 80y old  Male who presents with a chief complaint of R hip pain 2/2 failure of hardware.    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 pylori infection s/p triple therapy (10/2019), pancreatitis 2/2 choledocholithiasis s/p ERCP with stone extraction and plastic stent placement (11/2019, stent now removed), hematochezia 2/2 colonic AVMs s/p cauterization and hemorrhoids (11/2019), melena 2/2 duodenal ulcers s/p APC (4/2020), and recent admission from 8/19-8/24/20 for a R basicervical femoral neck fracture s/p R hip IMN (8/19/20) presents with failure of hardware in R hip. Pt was discharged to [facility] following his hospitalization in August, but pt was unable to participate much with physical therapy, as he continued to experience considerable pain in his R hip and difficulty with ambulation as a result of the pain. Pt saw his orthopedic surgeon, Dr. Trevor Foreman, in October, with imaging confirming cutout of the screw through the femoral head resulting in complete nonhealing of his R hip fracture. Pt is now booked for the OR for removal of failed hardware and conversion to total hip arthroplasty on 10/22/20. Pt was told to come to [facility] 2 days prior to his scheduled surgery for admission for preoperative medical optimization given that he is very high risk of mortality due to his cirrhosis. However, pt initially inadvertently went to [facility] and had to be transferred to [facility] Tuesday night.     At time of exam, pt appeared confused and initially did not know that he was in a hospital. Pt kept repeating that he has memory problems and that he shouldn't be asked questions about his health. Pt eventually did remember that he went to the wrong hospital last night and had to be transferred to his current location and reported that he had a very upsetting night as a result. Pt denies any CP, SOB, palpitations, lightheadedness, dizziness, fevers, chills, abdominal pain, N/V, diarrhea, constipation, melena, BRBPR, or dysuria, though he notes that he had urinary urgency overnight and soiled himself in bed. Collateral information was obtained from pt's wife (Shannon Florez, [phone]) over the phone. According to the wife, [facility], and since then, has been completely nonambulatory due to his R hip pain. Pt is unable to bear any weight on his R leg and just placing his R foot down on the floor causes severe pain. Pt was also minimally ambulatory at rehab. Prior to his R hip surgery in August, pt took daily walks with assistance of cane. The wife reports that pt has not complained of any CP, SOB, palpitations, lightheadedness, dizziness, fevers, chills, abdominal pain, or dysuria. She has not witnessed any syncope, falls, dark stools, or bloody BMs. She notes that pt has waxing and waning confusion and that missing a dose of lactulose tends to worsen his confusion.       REVIEW OF SYSTEMS:  Constitutional: No generalized weakness, fevers, chills, or weight loss  Eyes: No visual changes, double vision, or eye pain  Ears, Nose, Mouth, Throat: No runny nose, sinus pain, ear pain, tinnitus, sore throat, dysphagia, or odynophagia  Cardiovascular: No syncope, chest pain, palpitations, or LE edema  Respiratory: No cough, wheezing, hemoptysis, or shortness of breath  Gastrointestinal: No abdominal pain, nausea/vomiting, diarrhea/constipation, hematemesis, melena, or BRBPR  Genitourinary: +Urinary urgency overnight. No dysuria, frequency, or hematuria.  Musculoskeletal: +R hip pain. No back pain.  Skin: No pruritus or rashes  Neurologic: +Confusion. No seizures, headache, paresthesias, or numbness.  Psychiatric: No depression, anxiety, or agitation  Endocrine: No heat/cold intolerance, mood swings, sweats, polydipsia, or polyuria  Allergic/Immunologic: No anaphylaxis or allergic response to materials, foods, animals    Positives and pertinent negatives noted and all other systems negative.      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 pylori infection s/p triple therapy (10/2019)  Pancreatitis 2/2 choledocholithiasis s/p ERCP with stone extraction and plastic stent placement (11/2019, stent now removed)  Hematochezia 2/2 colonic AVMs s/p cauterization and hemorrhoids (11/2019)  Melena 2/2 duodenal ulcers s/p APC (4/2020  Inguinal hernia repair      MEDICATIONS  (STANDING):  acetaminophen   Tablet .. 975 milliGRAM(s) Oral every 8 hours  ascorbic acid 500 milliGRAM(s) Oral daily  calcium carbonate 1250 mG  + Vitamin D (OsCal 500 + D) 1 Tablet(s) Oral daily  chlorhexidine 2% Cloths 1 Application(s) Topical daily  furosemide    Tablet 20 milliGRAM(s) Oral daily  heparin   Injectable 5000 Unit(s) SubCutaneous every 8 hours  influenza   Vaccine 0.5 milliLiter(s) IntraMuscular once  pantoprazole    Tablet 40 milliGRAM(s) Oral before breakfast  propranolol 10 milliGRAM(s) Oral two times a day  senna 2 Tablet(s) Oral at bedtime  trimethoprim  160 mG/sulfamethoxazole 800 mG 1 Tablet(s) Oral every 12 hours    MEDICATIONS  (PRN):  magnesium hydroxide Suspension 30 milliLiter(s) Oral daily PRN Constipation  oxyCODONE    IR 2.5 milliGRAM(s) Oral every 4 hours PRN Moderate Pain (4 - 6)  oxyCODONE    IR 5 milliGRAM(s) Oral every 4 hours PRN Severe Pain (7 - 10)  traMADol 50 milliGRAM(s) Oral every 8 hours PRN Mild Pain (1 - 3)      ALLERGIES:  No Known Allergies    INTOLERANCES:  No Known Intolerances      FAMILY HISTORY:  Family history of ovarian cancer (Sibling)      SOCIAL HISTORY:  Smoking:  Alcohol:  Illicit Substance:       VITAL SIGNS Last 24 Hrs  T(C): 36.9 (21 Oct 2020 00:39), Max: 36.9 (21 Oct 2020 00:39)  T(F): 98.4 (21 Oct 2020 00:39), Max: 98.4 (21 Oct 2020 00:39)  HR: 55 (21 Oct 2020 00:39) (50 - 61)  BP: 157/57 (21 Oct 2020 00:39) (103/67 - 160/65)  BP(mean): --  RR: 17 (21 Oct 2020 00:39) (14 - 17)  SpO2: 100% (21 Oct 2020 00:39) (96% - 100%)    PHYSICAL EXAM:  Vital Signs Last 24 Hrs  T(C): 36.9 (21 Oct 2020 00:39), Max: 36.9 (21 Oct 2020 00:39)  T(F): 98.4 (21 Oct 2020 00:39), Max: 98.4 (21 Oct 2020 00:39)  HR: 55 (21 Oct 2020 00:39) (50 - 61)  BP: 157/57 (21 Oct 2020 00:39) (103/67 - 160/65)  BP(mean): --  RR: 17 (21 Oct 2020 00:39) (14 - 17)  SpO2: 100% (21 Oct 2020 00:39) (96% - 100%)    PHYSICAL EXAM:  General: Awake and alert.  No acute distress.  Head: Normocephalic, atraumatic.    Eyes: PERRL.  EOMI.  No scleral icterus.  No conjunctival pallor.  Mouth: Moist MM.  No oropharyngeal exudates.    Neck: Supple.  Full range of motion.  No JVD.  No LAD.  No thyromegaly.  Trachea midline.    Heart: Bradycardic, regular rhythm.  Normal S1 and S2.  No murmurs, rubs, or gallops.  Trace pitting LE edema b/l.   Lungs: Nonlabored breathing.  Good inspiratory effort.  CTAB.  No wheezes, crackles, or rhonchi.    Abdomen: BS+, soft, nontender with no rebound or guarding, nondistended, negative fluid wave  Skin: Warm and dry.  No rashes.  Extremities: No cyanosis.  2+ peripheral pulses b/l.  Musculoskeletal: TTP of R hip with ROM limited 2/2 pain. No spinal or paraspinal tenderness.  Neuro: A&Ox1 (to person only (initially), A&Ox2 (to person and place later on).  CN II-XII grossly intact.  5/5 motor strength in UE b/l and LLE, ROM limited in RLE.  Tactile sensation intact in UE and LE b/l.  No asterixis.  No focal deficits.      LABS:                        12.2   5.29  )-----------( 191      ( 20 Oct 2020 17:23 )             38.9     10-20    138  |  100  |  18  ----------------------------<  116<H>  4.0   |  26  |  1.46<H>    Ca    9.3      20 Oct 2020 17:23    TPro  6.6  /  Alb  3.2<L>  /  TBili  0.5  /  DBili  x   /  AST  23  /  ALT  13  /  AlkPhos  157<H>  10-20    PT/INR - ( 20 Oct 2020 17:23 )   PT: 18.6 sec;   INR: 1.58 ratio       PTT - ( 20 Oct 2020 17:23 )  PTT:31.3 sec      I&O's Summary      RADIOLOGY & ADDITIONAL TESTS:  EKG Personally Reviewed: Sinus diamante with 1st degree AVB with PACs, rate 55 bpm, MA interval 214 ms. RBBB (not new), QTc 470 ms.  Imaging Personally Reviewed:  [X] YES  [ ] NO    Consultant(s) Notes Reviewed:  [X] YES  [ ] NO    Care Discussed with Consultants/Other Providers [ ] YES  [ ] NO

## 2020-10-21 NOTE — PROGRESS NOTE ADULT - SUBJECTIVE AND OBJECTIVE BOX
Orthopaedic Surgery Progress Note    Subjective:   Patient seen and examined  No acute events overnight  Pain well controlled    Objective:  T(C): 36.9 (10-21-20 @ 00:39), Max: 36.9 (10-21-20 @ 00:39)  HR: 55 (10-21-20 @ 00:39) (50 - 61)  BP: 157/57 (10-21-20 @ 00:39) (103/67 - 160/65)  RR: 17 (10-21-20 @ 00:39) (14 - 17)  SpO2: 100% (10-21-20 @ 00:39) (96% - 100%)  Wt(kg): --      PE    NAD  RLE:   skin intact, healed surgical scars  motor intact GS/TA/EHL  SILT S/S/SP/DP  WWP                          12.2   5.29  )-----------( 191      ( 20 Oct 2020 17:23 )             38.9     10-20    138  |  100  |  18  ----------------------------<  116<H>  4.0   |  26  |  1.46<H>    Ca    9.3      20 Oct 2020 17:23    TPro  6.6  /  Alb  3.2<L>  /  TBili  0.5  /  DBili  x   /  AST  23  /  ALT  13  /  AlkPhos  157<H>  10-20    PT/INR - ( 20 Oct 2020 17:23 )   PT: 18.6 sec;   INR: 1.58 ratio         PTT - ( 20 Oct 2020 17:23 )  PTT:31.3 sec      80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

## 2020-10-21 NOTE — CONSULT NOTE ADULT - ASSESSMENT
Impression:  79 yo M w/ multiple medical comorbidities including decompensated EtOH cirrhosis (prior variceal bleed, prior HE), pancreatitis 2/2 choledocholithiasis s/p ERCP, multiple recent admissions for colonic AVM (11/2019, 4/2020) admitted for hip replacement.     # cirrhosis - EtOH cirrhosis, decompensated w/ variceal bleeding. MELD-Na 16 on 10/21/20. He is Child-Earl A. Currently his volume status is optimized with no ascites on exam, no recent GIB. He remains high risk for the procedure due to his cirrhosis (7 day mortality ~2%, 30-day mortality ~7%)  - varices: prior variceal bleeding, last EGD 4/2020 w/ small varices  - ascites: none  - HCC: neg CT 4/2020  - HE: none, had some years ago, since then on lactulose once or twice daily for constipation    Recommendations:  - judicious with crystalloid fluids peroperatively, as may third space and worsen ascites  - early mobility  - VTE ppx as patient likely high risk for thrombus despite elevated INR Impression:  81 yo M w/ multiple medical comorbidities including decompensated EtOH cirrhosis (prior variceal bleed, prior HE), pancreatitis 2/2 choledocholithiasis s/p ERCP, multiple recent admissions for colonic AVM (11/2019, 4/2020) admitted for hip replacement.     # cirrhosis - EtOH cirrhosis, decompensated w/ variceal bleeding. MELD-Na 16 on 10/21/20. He is Child-Earl A. Currently his volume status is optimized with no ascites on exam, no recent GIB. He remains high risk for the procedure due to his cirrhosis (7 day mortality ~2%, 30-day mortality ~7%)  - varices: prior variceal bleeding, last EGD 4/2020 w/ small varices  - ascites: none  - HCC: neg CT 4/2020  - HE: none, had some years ago, since then on lactulose once or twice daily for constipation    Recommendations:  - judicious with crystalloid fluids peroperatively, as may third space and worsen ascites  - early mobility  - VTE ppx as patient likely high risk for thrombus despite elevated INR     Impression:  81 yo M w/ multiple medical comorbidities including decompensated EtOH cirrhosis (prior variceal bleed, prior HE), pancreatitis 2/2 choledocholithiasis s/p ERCP, multiple recent admissions for colonic AVM (11/2019, 4/2020) admitted for hip replacement.     # cirrhosis - EtOH cirrhosis, decompensated w/ variceal bleeding. MELD-Na 16 on 10/21/20. He is Child-Earl A. Currently his volume status is optimized with no ascites on exam, no recent GIB. He remains high risk for the procedure due to his cirrhosis (7 day mortality ~2%, 30-day mortality ~7%)  - varices: prior variceal bleeding, last EGD 4/2020 w/ small varices  - ascites: none  - HCC: neg CT 4/2020  - HE: none, had some years ago, since then on lactulose once or twice daily for constipation    Recommendations:  - judicious with crystalloid fluids perioperatively, as may third space and worsen ascites  - maintain hemodynamic stability, minimize bleeding from surgery, optimize operative and anesthesia time  - early mobility  - VTE ppx as patient likely high risk for thrombus despite elevated INR  - check CMP, INR, and MELD-Na daily     Thank you for this interesting consult. Hepatology will continue to follow.     Andrew Seth, PGY4  Gastroenterology Fellow  Available on [employer] Teams  [phone] ([facility] Short Range Pager)  [phone] (Long Range Pager)    After 5pm, please contact the on-call GI fellow. [phone]   Impression:  79 yo M w/ multiple medical comorbidities including decompensated EtOH cirrhosis (prior variceal bleed, prior HE), pancreatitis 2/2 choledocholithiasis s/p ERCP, multiple recent admissions for colonic AVM (11/2019, 4/2020) admitted for hip replacement.     # cirrhosis - EtOH cirrhosis, decompensated w/ variceal bleeding. MELD-Na 16 on 10/21/20. He is Child-Earl A. Currently his volume status is optimized with no ascites on exam, no recent GIB. He remains high risk for the procedure due to his cirrhosis (7 day mortality ~2%, 30-day mortality ~7%)  - varices: prior variceal bleeding, last EGD 4/2020 w/ small varices  - ascites: none  - HCC: neg CT 4/2020  - HE: none, had some years ago, since then on lactulose once or twice daily for constipation    Recommendations:  - judicious with crystalloid fluids perioperatively, as may third space and worsen ascites  - maintain hemodynamic stability, minimize bleeding from surgery, optimize operative and anesthesia time  - early mobility  - standard VTE ppx as patient likely high risk for thrombus despite elevated INR  - can give 10mg IV vitamin K today and tomorrow to optimize INR prior to surgery  - check CMP, INR, and MELD-Na daily     Thank you for this interesting consult. Hepatology will continue to follow.     Andrew Seth, PGY4  Gastroenterology Fellow  Available on [employer] Teams  [phone] ([facility] Short Range Pager)  [phone] (Long Range Pager)    After 5pm, please contact the on-call GI fellow. [phone]

## 2020-10-21 NOTE — PROGRESS NOTE ADULT - SUBJECTIVE AND OBJECTIVE BOX
[facility] Division of Hospital Medicine  Jaimie Ramon MD  Pager (M-F, 8A-5P): [phone]  Other Times:  [phone]    Patient is a 80y old  Male who presents with a chief complaint of right hip fx (21 Oct 2020 06:12)    SUBJECTIVE / OVERNIGHT EVENTS: Patient seen and examined. No acute events overnight. Pain well controlled and patient without any complaints.    MEDICATIONS  (STANDING):  acetaminophen   Tablet .. 975 milliGRAM(s) Oral every 8 hours  ascorbic acid 500 milliGRAM(s) Oral daily  calcium carbonate 1250 mG  + Vitamin D (OsCal 500 + D) 1 Tablet(s) Oral daily  cefTRIAXone   IVPB 1000 milliGRAM(s) IV Intermittent every 24 hours  chlorhexidine 2% Cloths 1 Application(s) Topical daily  furosemide    Tablet 20 milliGRAM(s) Oral daily  heparin   Injectable 5000 Unit(s) SubCutaneous every 8 hours  influenza   Vaccine 0.5 milliLiter(s) IntraMuscular once  lactulose Syrup 15 Gram(s) Oral three times a day  pantoprazole    Tablet 40 milliGRAM(s) Oral before breakfast  propranolol 10 milliGRAM(s) Oral two times a day  senna 2 Tablet(s) Oral at bedtime  sodium chloride 0.9%. 1000 milliLiter(s) (75 mL/Hr) IV Continuous <Continuous>    MEDICATIONS  (PRN):  magnesium hydroxide Suspension 30 milliLiter(s) Oral daily PRN Constipation  oxyCODONE    IR 2.5 milliGRAM(s) Oral every 4 hours PRN Moderate Pain (4 - 6)  oxyCODONE    IR 5 milliGRAM(s) Oral every 4 hours PRN Severe Pain (7 - 10)  traMADol 50 milliGRAM(s) Oral every 8 hours PRN Mild Pain (1 - 3)      VITALS:  T(F): 97.7 (10-21-20 @ 10:31), Max: 98.4 (10-21-20 @ 00:39)  HR: 57 (10-21-20 @ 10:31) (50 - 61)  BP: 143/66 (10-21-20 @ 10:31) (103/67 - 160/65)  RR: 18 (10-21-20 @ 10:31) (14 - 18)  SpO2: 98% (10-21-20 @ 10:31)  Height (cm): 175.3 (16:21)  Weight (kg): 83 (16:21)  BMI (kg/m2): 27 (16:21)    CAPILLARY BLOOD GLUCOSE    PHYSICAL EXAM:  GENERAL: NAD, well-developed  EYES: EOMI, PERRLA, conjunctiva and sclera clear  CHEST/LUNG: Clear to auscultation bilaterally; No wheeze, normal respiratory rate  HEART: Regular rate and rhythm; No murmurs, rubs, or gallops, no LE edema  ABDOMEN: Soft, Nontender, Nondistended; Bowel sounds present  EXTREMITIES:  2+ Peripheral Pulses, No clubbing, cyanosi  PSYCH: AAOx2, calm   SKIN: No rashes or lesions    LABS:              12.0                 136  | 25   | 16           4.84  >-----------< 190     ------------------------< 94                    39.8                 3.9  | 100  | 1.33                                         Ca 9.2   Mg x     Ph x           TPro  6.6  /  Alb  3.2      TBili  0.5  /  DBili  x         AST  23  /  ALT  13            AlkPhos  157      INR: 1.54<H>;    PT: 17.4 SEC<H>;    PTT: 31.2 SEC        Urinalysis Basic - ( 21 Oct 2020 06:49 )    Color: LIGHT YELLOW / Appearance: Lt TURBID / S.006 / pH: 6.0  Gluc: NEGATIVE / Ketone: NEGATIVE  / Bili: NEGATIVE / Urobili: NORMAL   Blood: TRACE / Protein: 10 / Nitrite: NEGATIVE   Leuk Esterase: LARGE / RBC: 0-2 / WBC >50   Sq Epi: OCC / Non Sq Epi: x / Bacteria: MANY        RADIOLOGY & ADDITIONAL TESTS:  Imaging Personally Reviewed: [x] Yes    [ ] Consultant(s) Notes Reviewed:  [x] Care Discussed with Consultants/Other Providers: Orthopedic PA - discussed

## 2020-10-21 NOTE — CONSULT NOTE ADULT - PROBLEM SELECTOR RECOMMENDATION 2
- Pt with no active cardiac conditions, including unstable coronary syndrome/severe angina/recent MI, heart failure, arrhythmias, or structural heart disease. Pt with bradycardia in setting of 1st degree AVB, but pt currently asymptomatic. EKG on admission: Sinus diamante with 1st degree AVB with PACs, rate 55 bpm, CA interval 214 ms. RBBB (not new). TTE on 8/20/20 with normal LVSF and normal RV size and function.   - Pt with RCRI class I (0 risk predictors present), equating to a 0.5% risk of perioperative cardiac complications, including MI, pulmonary edema, cardiac arrest, or complete heart block  - Pt, however, with poor METs (<4) given limited mobility 2/2 ongoing R hip pain.   - As a result, pt likely at increased cardiac risk for an intermediate-risk procedure, but pt does not require any additional cardiac workup prior to the OR at this time.  - Pt will need Hepatology evaluation for his cirrhosis prior to the OR. F/u Hepatology recs.

## 2020-10-21 NOTE — PROGRESS NOTE ADULT - PROBLEM SELECTOR PLAN 2
- s/p R hip IMN on 8/19/20 now with right proximal femur nonunion   - Scheduled for OR for removal of failed hardware and conversion to total hip arthroplasty on 10/22/20.  - management per ortho  - c/w pain control with Tramadol and Oxycodone PRN, but will need to be cautious with dosage given presence of cirrhosis

## 2020-10-21 NOTE — CONSULT NOTE ADULT - PROBLEM SELECTOR RECOMMENDATION 6
Not new. RBBB present on EKG from 8/2020.  - TTE on 8/20/20 with normal LVSF and normal RV size and function and mild pulm HTN

## 2020-10-21 NOTE — PROGRESS NOTE ADULT - PROBLEM SELECTOR PLAN 8
- RBBB present on EKG from 8/2020.  - TTE on 8/20/20 with normal LVSF and normal RV size and function and mild pulm HTN.

## 2020-10-22 ENCOUNTER — APPOINTMENT (OUTPATIENT)
Dept: ORTHOPEDIC SURGERY | Facility: HOSPITAL | Age: 80
End: 2020-10-22
Payer: MEDICARE

## 2020-10-22 ENCOUNTER — RESULT REVIEW (OUTPATIENT)
Age: 80
End: 2020-10-22

## 2020-10-22 LAB
ALBUMIN SERPL ELPH-MCNC: 3.2 G/DL — LOW (ref 3.3–5)
ALP SERPL-CCNC: 117 U/L — SIGNIFICANT CHANGE UP (ref 40–120)
ALT FLD-CCNC: 8 U/L — SIGNIFICANT CHANGE UP (ref 4–41)
ANION GAP SERPL CALC-SCNC: 11 MMO/L — SIGNIFICANT CHANGE UP (ref 7–14)
ANION GAP SERPL CALC-SCNC: 12 MMO/L — SIGNIFICANT CHANGE UP (ref 7–14)
APTT BLD: 33.7 SEC — SIGNIFICANT CHANGE UP (ref 27–36.3)
APTT BLD: 34 SEC — SIGNIFICANT CHANGE UP (ref 27–36.3)
APTT BLD: 34.5 SEC — SIGNIFICANT CHANGE UP (ref 27–36.3)
APTT BLD: 34.5 SEC — SIGNIFICANT CHANGE UP (ref 27–36.3)
AST SERPL-CCNC: 21 U/L — SIGNIFICANT CHANGE UP (ref 4–40)
BASE EXCESS BLDA CALC-SCNC: -3.9 MMOL/L — SIGNIFICANT CHANGE UP
BILIRUB SERPL-MCNC: 0.6 MG/DL — SIGNIFICANT CHANGE UP (ref 0.2–1.2)
BLD GP AB SCN SERPL QL: NEGATIVE — SIGNIFICANT CHANGE UP
BUN SERPL-MCNC: 13 MG/DL — SIGNIFICANT CHANGE UP (ref 7–23)
BUN SERPL-MCNC: 15 MG/DL — SIGNIFICANT CHANGE UP (ref 7–23)
CA-I BLDA-SCNC: 1.13 MMOL/L — LOW (ref 1.15–1.29)
CALCIUM SERPL-MCNC: 8.4 MG/DL — SIGNIFICANT CHANGE UP (ref 8.4–10.5)
CALCIUM SERPL-MCNC: 8.9 MG/DL — SIGNIFICANT CHANGE UP (ref 8.4–10.5)
CHLORIDE SERPL-SCNC: 105 MMOL/L — SIGNIFICANT CHANGE UP (ref 98–107)
CHLORIDE SERPL-SCNC: 106 MMOL/L — SIGNIFICANT CHANGE UP (ref 98–107)
CO2 SERPL-SCNC: 20 MMOL/L — LOW (ref 22–31)
CO2 SERPL-SCNC: 22 MMOL/L — SIGNIFICANT CHANGE UP (ref 22–31)
CREAT SERPL-MCNC: 1.17 MG/DL — SIGNIFICANT CHANGE UP (ref 0.5–1.3)
CREAT SERPL-MCNC: 1.3 MG/DL — SIGNIFICANT CHANGE UP (ref 0.5–1.3)
GLUCOSE BLDA-MCNC: 188 MG/DL — HIGH (ref 70–99)
GLUCOSE SERPL-MCNC: 171 MG/DL — HIGH (ref 70–99)
GLUCOSE SERPL-MCNC: 98 MG/DL — SIGNIFICANT CHANGE UP (ref 70–99)
HCO3 BLDA-SCNC: 21 MMOL/L — LOW (ref 22–26)
HCT VFR BLD CALC: 37.3 % — LOW (ref 39–50)
HCT VFR BLD CALC: 38.7 % — LOW (ref 39–50)
HCT VFR BLDA CALC: 37.7 % — LOW (ref 39–51)
HGB BLD-MCNC: 11.2 G/DL — LOW (ref 13–17)
HGB BLD-MCNC: 11.5 G/DL — LOW (ref 13–17)
HGB BLDA-MCNC: 12.3 G/DL — LOW (ref 13–17)
INR BLD: 1.1 — SIGNIFICANT CHANGE UP (ref 0.88–1.16)
INR BLD: 1.45 — HIGH (ref 0.88–1.16)
INR BLD: 1.48 — HIGH (ref 0.88–1.16)
INR BLD: 1.6 — HIGH (ref 0.88–1.16)
MAGNESIUM SERPL-MCNC: 1.3 MG/DL — LOW (ref 1.6–2.6)
MAGNESIUM SERPL-MCNC: 1.6 MG/DL — SIGNIFICANT CHANGE UP (ref 1.6–2.6)
MCHC RBC-ENTMCNC: 24.8 PG — LOW (ref 27–34)
MCHC RBC-ENTMCNC: 25.1 PG — LOW (ref 27–34)
MCHC RBC-ENTMCNC: 29.7 % — LOW (ref 32–36)
MCHC RBC-ENTMCNC: 30 % — LOW (ref 32–36)
MCV RBC AUTO: 82.5 FL — SIGNIFICANT CHANGE UP (ref 80–100)
MCV RBC AUTO: 84.3 FL — SIGNIFICANT CHANGE UP (ref 80–100)
NRBC # FLD: 0 K/UL — SIGNIFICANT CHANGE UP (ref 0–0)
NRBC # FLD: 0 K/UL — SIGNIFICANT CHANGE UP (ref 0–0)
PCO2 BLDA: 40 MMHG — SIGNIFICANT CHANGE UP (ref 35–48)
PH BLDA: 7.34 PH — LOW (ref 7.35–7.45)
PHOSPHATE SERPL-MCNC: 2.5 MG/DL — SIGNIFICANT CHANGE UP (ref 2.5–4.5)
PHOSPHATE SERPL-MCNC: 3.1 MG/DL — SIGNIFICANT CHANGE UP (ref 2.5–4.5)
PLATELET # BLD AUTO: 178 K/UL — SIGNIFICANT CHANGE UP (ref 150–400)
PLATELET # BLD AUTO: 237 K/UL — SIGNIFICANT CHANGE UP (ref 150–400)
PMV BLD: 10 FL — SIGNIFICANT CHANGE UP (ref 7–13)
PMV BLD: 9.9 FL — SIGNIFICANT CHANGE UP (ref 7–13)
PO2 BLDA: 131 MMHG — HIGH (ref 83–108)
POTASSIUM BLDA-SCNC: 3.7 MMOL/L — SIGNIFICANT CHANGE UP (ref 3.4–4.5)
POTASSIUM SERPL-MCNC: 3.7 MMOL/L — SIGNIFICANT CHANGE UP (ref 3.5–5.3)
POTASSIUM SERPL-MCNC: 4 MMOL/L — SIGNIFICANT CHANGE UP (ref 3.5–5.3)
POTASSIUM SERPL-SCNC: 3.7 MMOL/L — SIGNIFICANT CHANGE UP (ref 3.5–5.3)
POTASSIUM SERPL-SCNC: 4 MMOL/L — SIGNIFICANT CHANGE UP (ref 3.5–5.3)
PROT SERPL-MCNC: 5.9 G/DL — LOW (ref 6–8.3)
PROTHROM AB SERPL-ACNC: 12.5 SEC — SIGNIFICANT CHANGE UP (ref 10.6–13.6)
PROTHROM AB SERPL-ACNC: 16.2 SEC — HIGH (ref 10.6–13.6)
PROTHROM AB SERPL-ACNC: 16.6 SEC — HIGH (ref 10.6–13.6)
PROTHROM AB SERPL-ACNC: 17.8 SEC — HIGH (ref 10.6–13.6)
RBC # BLD: 4.52 M/UL — SIGNIFICANT CHANGE UP (ref 4.2–5.8)
RBC # BLD: 4.59 M/UL — SIGNIFICANT CHANGE UP (ref 4.2–5.8)
RBC # FLD: 15.2 % — HIGH (ref 10.3–14.5)
RBC # FLD: 15.5 % — HIGH (ref 10.3–14.5)
RH IG SCN BLD-IMP: POSITIVE — SIGNIFICANT CHANGE UP
SAO2 % BLDA: 98.8 % — SIGNIFICANT CHANGE UP (ref 95–99)
SODIUM BLDA-SCNC: 138 MMOL/L — SIGNIFICANT CHANGE UP (ref 136–146)
SODIUM SERPL-SCNC: 138 MMOL/L — SIGNIFICANT CHANGE UP (ref 135–145)
SODIUM SERPL-SCNC: 138 MMOL/L — SIGNIFICANT CHANGE UP (ref 135–145)
WBC # BLD: 10.66 K/UL — HIGH (ref 3.8–10.5)
WBC # BLD: 4.42 K/UL — SIGNIFICANT CHANGE UP (ref 3.8–10.5)
WBC # FLD AUTO: 10.66 K/UL — HIGH (ref 3.8–10.5)
WBC # FLD AUTO: 4.42 K/UL — SIGNIFICANT CHANGE UP (ref 3.8–10.5)

## 2020-10-22 PROCEDURE — 88305 TISSUE EXAM BY PATHOLOGIST: CPT | Mod: 26

## 2020-10-22 PROCEDURE — 71045 X-RAY EXAM CHEST 1 VIEW: CPT | Mod: 26

## 2020-10-22 PROCEDURE — ZZZZZ: CPT

## 2020-10-22 PROCEDURE — 88311 DECALCIFY TISSUE: CPT | Mod: 26

## 2020-10-22 PROCEDURE — 72170 X-RAY EXAM OF PELVIS: CPT | Mod: 26

## 2020-10-22 PROCEDURE — 99233 SBSQ HOSP IP/OBS HIGH 50: CPT

## 2020-10-22 RX ORDER — CEFTRIAXONE 500 MG/1
1000 INJECTION, POWDER, FOR SOLUTION INTRAMUSCULAR; INTRAVENOUS ONCE
Refills: 0 | Status: DISCONTINUED | OUTPATIENT
Start: 2020-10-22 | End: 2020-10-22

## 2020-10-22 RX ORDER — CEFAZOLIN SODIUM 1 G
2000 VIAL (EA) INJECTION EVERY 8 HOURS
Refills: 0 | Status: COMPLETED | OUTPATIENT
Start: 2020-10-23 | End: 2020-10-23

## 2020-10-22 RX ORDER — PROTHROMBIN COMPLEX CONCENTRATE (HUMAN) 25.5; 16.5; 24; 22; 22; 26 [IU]/ML; [IU]/ML; [IU]/ML; [IU]/ML; [IU]/ML; [IU]/ML
1500 POWDER, FOR SOLUTION INTRAVENOUS ONCE
Refills: 0 | Status: COMPLETED | OUTPATIENT
Start: 2020-10-22 | End: 2020-10-22

## 2020-10-22 RX ORDER — HYDROMORPHONE HYDROCHLORIDE 2 MG/ML
0.25 INJECTION INTRAMUSCULAR; INTRAVENOUS; SUBCUTANEOUS
Refills: 0 | Status: DISCONTINUED | OUTPATIENT
Start: 2020-10-22 | End: 2020-10-22

## 2020-10-22 RX ORDER — CEFTRIAXONE 500 MG/1
1000 INJECTION, POWDER, FOR SOLUTION INTRAMUSCULAR; INTRAVENOUS ONCE
Refills: 0 | Status: COMPLETED | OUTPATIENT
Start: 2020-10-22 | End: 2020-10-22

## 2020-10-22 RX ORDER — PHYTONADIONE (VIT K1) 5 MG
10 TABLET ORAL ONCE
Refills: 0 | Status: COMPLETED | OUTPATIENT
Start: 2020-10-22 | End: 2020-10-22

## 2020-10-22 RX ORDER — ONDANSETRON 8 MG/1
4 TABLET, FILM COATED ORAL ONCE
Refills: 0 | Status: DISCONTINUED | OUTPATIENT
Start: 2020-10-22 | End: 2020-10-22

## 2020-10-22 RX ORDER — CHLORHEXIDINE GLUCONATE 213 G/1000ML
1 SOLUTION TOPICAL
Refills: 0 | Status: DISCONTINUED | OUTPATIENT
Start: 2020-10-22 | End: 2020-10-23

## 2020-10-22 RX ORDER — PROTHROMBIN COMPLEX CONCENTRATE (HUMAN) 25.5; 16.5; 24; 22; 22; 26 [IU]/ML; [IU]/ML; [IU]/ML; [IU]/ML; [IU]/ML; [IU]/ML
1500 POWDER, FOR SOLUTION INTRAVENOUS ONCE
Refills: 0 | Status: DISCONTINUED | OUTPATIENT
Start: 2020-10-22 | End: 2020-10-22

## 2020-10-22 RX ADMIN — LACTULOSE 15 GRAM(S): 10 SOLUTION ORAL at 05:28

## 2020-10-22 RX ADMIN — CHLORHEXIDINE GLUCONATE 1 APPLICATION(S): 213 SOLUTION TOPICAL at 05:29

## 2020-10-22 RX ADMIN — Medication 975 MILLIGRAM(S): at 05:29

## 2020-10-22 RX ADMIN — SODIUM CHLORIDE 75 MILLILITER(S): 9 INJECTION INTRAMUSCULAR; INTRAVENOUS; SUBCUTANEOUS at 08:54

## 2020-10-22 RX ADMIN — SODIUM CHLORIDE 75 MILLILITER(S): 9 INJECTION INTRAMUSCULAR; INTRAVENOUS; SUBCUTANEOUS at 20:01

## 2020-10-22 RX ADMIN — PANTOPRAZOLE SODIUM 40 MILLIGRAM(S): 20 TABLET, DELAYED RELEASE ORAL at 05:28

## 2020-10-22 RX ADMIN — CEFTRIAXONE 100 MILLIGRAM(S): 500 INJECTION, POWDER, FOR SOLUTION INTRAMUSCULAR; INTRAVENOUS at 08:55

## 2020-10-22 RX ADMIN — Medication 1 APPLICATION(S): at 05:29

## 2020-10-22 RX ADMIN — PROTHROMBIN COMPLEX CONCENTRATE (HUMAN) 400 INTERNATIONAL UNIT(S): 25.5; 16.5; 24; 22; 22; 26 POWDER, FOR SOLUTION INTRAVENOUS at 08:21

## 2020-10-22 RX ADMIN — Medication 20 MILLIGRAM(S): at 05:28

## 2020-10-22 RX ADMIN — Medication 102 MILLIGRAM(S): at 01:51

## 2020-10-22 NOTE — PROGRESS NOTE ADULT - SUBJECTIVE AND OBJECTIVE BOX
[facility] Division of Hospital Medicine  Jaimie Ramon MD  Pager (M-F, 8A-5P): [phone]  Other Times:  [phone]    Patient is a 80y old  Male who presents with a chief complaint of right hip fx (22 Oct 2020 06:31)    SUBJECTIVE / OVERNIGHT EVENTS: Patient seen and examined. No acute events overnight. Pain well controlled and patient without any complaints.    MEDICATIONS  (STANDING):  ascorbic acid 500 milliGRAM(s) Oral daily  calcium carbonate 1250 mG  + Vitamin D (OsCal 500 + D) 1 Tablet(s) Oral daily  furosemide    Tablet 20 milliGRAM(s) Oral daily  influenza   Vaccine 0.5 milliLiter(s) IntraMuscular once  lactulose Syrup 15 Gram(s) Oral three times a day  pantoprazole    Tablet 40 milliGRAM(s) Oral before breakfast  propranolol 10 milliGRAM(s) Oral two times a day  senna 2 Tablet(s) Oral at bedtime  sodium chloride 0.9%. 1000 milliLiter(s) (75 mL/Hr) IV Continuous <Continuous>    MEDICATIONS  (PRN):  magnesium hydroxide Suspension 30 milliLiter(s) Oral daily PRN Constipation  oxyCODONE    IR 2.5 milliGRAM(s) Oral every 4 hours PRN Moderate Pain (4 - 6)  oxyCODONE    IR 5 milliGRAM(s) Oral every 4 hours PRN Severe Pain (7 - 10)  traMADol 50 milliGRAM(s) Oral every 8 hours PRN Mild Pain (1 - 3)      VITALS:  T(F): 98.1 (10-22-20 @ 09:13), Max: 98.2 (10-22-20 @ 05:12)  HR: 84 (10-22-20 @ 09:13) (56 - 84)  BP: 119/74 (10-22-20 @ 09:13) (116/51 - 154/68)  RR: 17 (10-22-20 @ 09:13) (17 - 18)  SpO2: 98% (10-22-20 @ 09:13)  Height (cm): 172.7 (08:25)  Weight (kg): 87.2 (08:25)  BMI (kg/m2): 29.2 (08:25)    CAPILLARY BLOOD GLUCOSE    PHYSICAL EXAM:  GENERAL: NAD, well-developed  EYES: EOMI, PERRLA, conjunctiva and sclera clear  CHEST/LUNG: Clear to auscultation bilaterally; No wheeze, normal respiratory rate  HEART: Regular rate and rhythm; No murmurs, rubs, or gallops, no LE edema  ABDOMEN: Soft, Nontender, Nondistended; Bowel sounds present  EXTREMITIES:  2+ Peripheral Pulses, No clubbing, cyanosi  PSYCH: AAOx2, calm   SKIN: No rashes or lesions    LABS:              11.2                 138  | 22   | 15           4.42  >-----------< 178     ------------------------< 98                    37.3                 3.7  | 105  | 1.30                                         Ca 8.9   Mg 1.6   Ph 2.5         TPro  6.6  /  Alb  3.2      TBili  0.5  /  DBili  x         AST  23  /  ALT  13            AlkPhos  157      INR: 1.10 ;    PT: 12.5 SEC;    PTT: 33.7 SEC        Urinalysis Basic - ( 21 Oct 2020 06:49 )    Color: LIGHT YELLOW / Appearance: Lt TURBID / S.006 / pH: 6.0  Gluc: NEGATIVE / Ketone: NEGATIVE  / Bili: NEGATIVE / Urobili: NORMAL   Blood: TRACE / Protein: 10 / Nitrite: NEGATIVE   Leuk Esterase: LARGE / RBC: 0-2 / WBC >50   Sq Epi: OCC / Non Sq Epi: x / Bacteria: MANY        RADIOLOGY & ADDITIONAL TESTS:  Imaging Personally Reviewed: [x] Yes    [ ] Consultant(s) Notes Reviewed:  [x] Care Discussed with Consultants/Other Providers: Orthopedic PA - discussed

## 2020-10-22 NOTE — PROVIDER CONTACT NOTE (OTHER) - ASSESSMENT
Kcentra ordered for patient before going to OR at 10am, team wants primary nurse to administer at 8am. Nurse to verify if she is able to administer medication. LU Cuevas reached out to [facility] and spoke to Trisha ([id number]) who reached to her supervisor Ismael. LU brought to pharmactist attention that according to IV medication administration policy KCENTRA can only be given by an MD, DO, NP, PA, RN who has been educated in administering or has ICU experience. Trisha called back stating that her supervisor Ismael said that according to the current policy that this is correct until a new policy is in place to override the current policy. Ortho notified about the policy and suggests to reach out to clinical impact nurse to administer KCENTRA.

## 2020-10-22 NOTE — CHART NOTE - NSCHARTNOTEFT_GEN_A_CORE
POST-OPERATIVE NOTE    Subjective:  Patient is s/p right hip PADMINI, SUNDEEP.     Vital Signs Last 24 Hrs  T(C): 36.6 (22 Oct 2020 19:55), Max: 36.8 (22 Oct 2020 05:12)  T(F): 97.8 (22 Oct 2020 19:55), Max: 98.2 (22 Oct 2020 05:12)  HR: 58 (22 Oct 2020 19:55) (55 - 122)  BP: 134/64 (22 Oct 2020 19:55) (119/74 - 176/85)  BP(mean): 113 (22 Oct 2020 18:45) (93 - 117)  RR: 19 (22 Oct 2020 19:55) (13 - 28)  SpO2: 100% (22 Oct 2020 19:55) (93% - 100%)  I&O's Detail    21 Oct 2020 07:01  -  22 Oct 2020 07:00  --------------------------------------------------------  IN:  Total IN: 0 mL    OUT:    Voided (mL): 325 mL  Total OUT: 325 mL    Total NET: -325 mL      22 Oct 2020 07:01  -  22 Oct 2020 21:40  --------------------------------------------------------  IN:    IV PiggyBack: 50 mL  Total IN: 50 mL    OUT:    Accordian (mL): 60 mL    Voided (mL): 225 mL  Total OUT: 285 mL    Total NET: -235 mL        furosemide    Tablet 20  propranolol 10    PAST MEDICAL & SURGICAL HISTORY:  Porcelain gallbladder    Liver cirrhosis    Guillain-[place] syndrome    HTN (hypertension)    H/O inguinal hernia repair          Physical Exam:  General: NAD, resting comfortably in bed  Pulmonary: Nonlabored breathing, no respiratory distress.  Gen: awake, alert, NAD  Resp: no increased work of breathing  RLE:  - HV drain  - dressing c/d/i  +EHL/FHL/TA/GS  SILT L3-S1  +DP/PT Pulses  Compartments soft  No calf TTP       LABS:                        11.5   10.66 )-----------( 237      ( 22 Oct 2020 18:30 )             38.7     10-22    138  |  106  |  13  ----------------------------<  171<H>  4.0   |  20<L>  |  1.17    Ca    8.4      22 Oct 2020 18:30  Phos  3.1     10-22  Mg     1.3     10-22    TPro  5.9<L>  /  Alb  3.2<L>  /  TBili  0.6  /  DBili  x   /  AST  21  /  ALT  8   /  AlkPhos  117  10-22    PT/INR - ( 22 Oct 2020 18:30 )   PT: 16.2 SEC;   INR: 1.45          PTT - ( 22 Oct 2020 18:30 )  PTT:34.0 SEC  CAPILLARY BLOOD GLUCOSE          Radiology and Additional Studies:    Assessment:  The patient is a 80y Male who is now several hours post-op from a R hip PADMINI, SUNDEEP. episode of SVT postop resolved. Patient currently stable    Plan:  - Pain control as needed  - PT/OT  - I/S  - advance diet  - telemetry  - DVT ppx  - hepatology/OHOS recs  - monitor HV output  - FU AM labs

## 2020-10-22 NOTE — BRIEF OPERATIVE NOTE - NSICDXBRIEFPROCEDURE_GEN_ALL_CORE_FT
PROCEDURES:  Right total hip arthroplasty 22-Oct-2020 17:56:56  Abraham Vincent  Removal of intramedullary nail 22-Oct-2020 17:57:07  Abraham Vincent

## 2020-10-22 NOTE — PROGRESS NOTE ADULT - PROBLEM SELECTOR PLAN 4
- UA positive, patient asymptomatic (denies dysuria, urgency, change in frequency, and is currently afebrile without leukocytosis)  - no indication to treat asymptomatic bacteriuria at this time, continue to monitor, received CTX x1 on admission, d/c further doses, preop abx per ortho  - of note patient has hx of ESBL E.coli UTI, should patient develop urinary/infectious symptoms, check UA/UCx, and would need empiric carbapenem for ESBL coverage

## 2020-10-22 NOTE — CHART NOTE - NSCHARTNOTEFT_GEN_A_CORE
Patient's MELD-Na recalculated today.   Labs form 10/22 showed Na 138, Cr 1.3, INR 1.10  (TB not available, it was 0.5 yesterday). Cr and INR improved. MELD-Na using TB of 0.5 is 12.   He remains in child A class.  30-day Perioperative mortality per MELD-Na estimated around 10%-13%

## 2020-10-22 NOTE — PROGRESS NOTE ADULT - PROBLEM SELECTOR PLAN 3
- INR 1.54. Given presence of cirrhosis, pt likely with risks of hypo- and hypercoagulability  - s/p vitamin K and Kcentra preop per ortho

## 2020-10-22 NOTE — PROVIDER CONTACT NOTE (OTHER) - SITUATION
Kcentra ordered for patient going to OR at 10am, primary nurse to verify if she is able to administer medication.

## 2020-10-22 NOTE — PRE-OP CHECKLIST - SELECT TESTS ORDERED
Urinalysis/BMP/PT/PTT/CBC/INR Urinalysis/CBC/INR/BMP/PT/PTT/Type and Cross CXR/BMP/CBC/INR/Type and Screen/Urinalysis/EKG/PT/PTT/Type and Cross/COVID

## 2020-10-22 NOTE — PROGRESS NOTE ADULT - ASSESSMENT
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 pylori infection s/p triple therapy (10/2019), pancreatitis 2/2 choledocholithiasis s/p ERCP with stone extraction and plastic stent placement (11/2019, stent now removed), hematochezia 2/2 colonic AVMs s/p cauterization and hemorrhoids (11/2019), melena 2/2 duodenal ulcers s/p APC (4/2020), and recent admission from 8/19-8/24/20 for a R basicervical femoral neck fracture s/p R hip IMN (8/19/20) admitted for right proximal femur nonunion pending OR tomorrow. 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

## 2020-10-22 NOTE — PROGRESS NOTE ADULT - PROBLEM SELECTOR PLAN 5
- SCr. 1.3, baseline between 1.1 and 1.3.   - Avoid NSAIDs and nephrotoxic agents, Renally dose meds  - Monitor Cr and urine output

## 2020-10-22 NOTE — PROGRESS NOTE ADULT - SUBJECTIVE AND OBJECTIVE BOX
Orthopaedic Surgery Progress Note    Subjective:   Patient seen and examined  No acute events overnight  Pain well controlled    T(C): 36.8 (10-22-20 @ 05:12), Max: 36.8 (10-22-20 @ 05:12)  HR: 76 (10-22-20 @ 05:12) (56 - 76)  BP: 137/70 (10-22-20 @ 05:12) (116/51 - 154/68)  RR: 17 (10-22-20 @ 05:12) (17 - 18)  SpO2: 100% (10-22-20 @ 05:12) (94% - 100%)  Wt(kg): --                          11.2   4.42  )-----------( 178      ( 22 Oct 2020 03:25 )             37.3     10-22    138  |  105  |  15  ----------------------------<  98  3.7   |  22  |  1.30    Ca    8.9      22 Oct 2020 03:25  Phos  2.5     10-22  Mg     1.6     10-22    TPro  6.6  /  Alb  3.2<L>  /  TBili  0.5  /  DBili  x   /  AST  23  /  ALT  13  /  AlkPhos  157<H>  10-20    PT/INR - ( 22 Oct 2020 03:25 )   PT: 17.8 SEC;   INR: 1.60          PTT - ( 22 Oct 2020 03:25 )  PTT:34.5 SEC    PE    NAD  RLE:   skin intact, healed surgical scars  motor intact GS/TA/EHL  SILT S/S/SP/DP  WWP          80y Male with right proximal femur nonunion  - Pain control  - FU repeat INR, possible Kcentra prior to OR  - NPO/IVF   - Hold anticoagulation  - CBC/BMP/Coags/UA/T+S x2  - EKG/CXR done  - medical/hepatology clearance documented  - Plan for OR for PADMINI/SUNDEEP

## 2020-10-23 ENCOUNTER — TRANSCRIPTION ENCOUNTER (OUTPATIENT)
Age: 80
End: 2020-10-23

## 2020-10-23 DIAGNOSIS — I47.1 SUPRAVENTRICULAR TACHYCARDIA: ICD-10-CM

## 2020-10-23 LAB
ALBUMIN SERPL ELPH-MCNC: 2.9 G/DL — LOW (ref 3.3–5)
ALP SERPL-CCNC: 100 U/L — SIGNIFICANT CHANGE UP (ref 40–120)
ALT FLD-CCNC: 8 U/L — SIGNIFICANT CHANGE UP (ref 4–41)
ANION GAP SERPL CALC-SCNC: 14 MMO/L — SIGNIFICANT CHANGE UP (ref 7–14)
APTT BLD: 36.9 SEC — HIGH (ref 27–36.3)
AST SERPL-CCNC: 23 U/L — SIGNIFICANT CHANGE UP (ref 4–40)
BILIRUB SERPL-MCNC: 0.3 MG/DL — SIGNIFICANT CHANGE UP (ref 0.2–1.2)
BUN SERPL-MCNC: 16 MG/DL — SIGNIFICANT CHANGE UP (ref 7–23)
CALCIUM SERPL-MCNC: 8.2 MG/DL — LOW (ref 8.4–10.5)
CHLORIDE SERPL-SCNC: 103 MMOL/L — SIGNIFICANT CHANGE UP (ref 98–107)
CO2 SERPL-SCNC: 20 MMOL/L — LOW (ref 22–31)
CREAT SERPL-MCNC: 1.28 MG/DL — SIGNIFICANT CHANGE UP (ref 0.5–1.3)
GLUCOSE SERPL-MCNC: 121 MG/DL — HIGH (ref 70–99)
HCT VFR BLD CALC: 32.2 % — LOW (ref 39–50)
HGB BLD-MCNC: 9.8 G/DL — LOW (ref 13–17)
INR BLD: 1.5 — HIGH (ref 0.88–1.16)
MAGNESIUM SERPL-MCNC: 1.5 MG/DL — LOW (ref 1.6–2.6)
MCHC RBC-ENTMCNC: 25.3 PG — LOW (ref 27–34)
MCHC RBC-ENTMCNC: 30.4 % — LOW (ref 32–36)
MCV RBC AUTO: 83 FL — SIGNIFICANT CHANGE UP (ref 80–100)
NRBC # FLD: 0 K/UL — SIGNIFICANT CHANGE UP (ref 0–0)
PHOSPHATE SERPL-MCNC: 2.7 MG/DL — SIGNIFICANT CHANGE UP (ref 2.5–4.5)
PLATELET # BLD AUTO: 174 K/UL — SIGNIFICANT CHANGE UP (ref 150–400)
PMV BLD: 10.3 FL — SIGNIFICANT CHANGE UP (ref 7–13)
POTASSIUM SERPL-MCNC: 3.5 MMOL/L — SIGNIFICANT CHANGE UP (ref 3.5–5.3)
POTASSIUM SERPL-SCNC: 3.5 MMOL/L — SIGNIFICANT CHANGE UP (ref 3.5–5.3)
PROT SERPL-MCNC: 5.8 G/DL — LOW (ref 6–8.3)
PROTHROM AB SERPL-ACNC: 16.9 SEC — HIGH (ref 10.6–13.6)
RBC # BLD: 3.88 M/UL — LOW (ref 4.2–5.8)
RBC # FLD: 15.6 % — HIGH (ref 10.3–14.5)
SARS-COV-2 RNA SPEC QL NAA+PROBE: SIGNIFICANT CHANGE UP
SODIUM SERPL-SCNC: 137 MMOL/L — SIGNIFICANT CHANGE UP (ref 135–145)
WBC # BLD: 7.62 K/UL — SIGNIFICANT CHANGE UP (ref 3.8–10.5)
WBC # FLD AUTO: 7.62 K/UL — SIGNIFICANT CHANGE UP (ref 3.8–10.5)

## 2020-10-23 PROCEDURE — 99232 SBSQ HOSP IP/OBS MODERATE 35: CPT | Mod: GC

## 2020-10-23 PROCEDURE — 99232 SBSQ HOSP IP/OBS MODERATE 35: CPT

## 2020-10-23 PROCEDURE — 71045 X-RAY EXAM CHEST 1 VIEW: CPT | Mod: 26

## 2020-10-23 RX ORDER — ONDANSETRON 8 MG/1
4 TABLET, FILM COATED ORAL EVERY 6 HOURS
Refills: 0 | Status: DISCONTINUED | OUTPATIENT
Start: 2020-10-23 | End: 2020-10-24

## 2020-10-23 RX ORDER — MAGNESIUM SULFATE 500 MG/ML
1 VIAL (ML) INJECTION ONCE
Refills: 0 | Status: COMPLETED | OUTPATIENT
Start: 2020-10-23 | End: 2020-10-23

## 2020-10-23 RX ORDER — ENOXAPARIN SODIUM 100 MG/ML
30 INJECTION SUBCUTANEOUS DAILY
Refills: 0 | Status: DISCONTINUED | OUTPATIENT
Start: 2020-10-23 | End: 2020-10-24

## 2020-10-23 RX ORDER — TRAMADOL HYDROCHLORIDE 50 MG/1
25 TABLET ORAL THREE TIMES A DAY
Refills: 0 | Status: DISCONTINUED | OUTPATIENT
Start: 2020-10-23 | End: 2020-10-24

## 2020-10-23 RX ORDER — GABAPENTIN 400 MG/1
100 CAPSULE ORAL THREE TIMES A DAY
Refills: 0 | Status: DISCONTINUED | OUTPATIENT
Start: 2020-10-23 | End: 2020-10-24

## 2020-10-23 RX ORDER — POLYETHYLENE GLYCOL 3350 17 G/17G
17 POWDER, FOR SOLUTION ORAL DAILY
Refills: 0 | Status: DISCONTINUED | OUTPATIENT
Start: 2020-10-23 | End: 2020-10-24

## 2020-10-23 RX ADMIN — LACTULOSE 15 GRAM(S): 10 SOLUTION ORAL at 14:42

## 2020-10-23 RX ADMIN — TRAMADOL HYDROCHLORIDE 25 MILLIGRAM(S): 50 TABLET ORAL at 10:21

## 2020-10-23 RX ADMIN — Medication 20 MILLIGRAM(S): at 06:54

## 2020-10-23 RX ADMIN — PANTOPRAZOLE SODIUM 40 MILLIGRAM(S): 20 TABLET, DELAYED RELEASE ORAL at 06:54

## 2020-10-23 RX ADMIN — Medication 500 MILLIGRAM(S): at 14:42

## 2020-10-23 RX ADMIN — LACTULOSE 15 GRAM(S): 10 SOLUTION ORAL at 06:54

## 2020-10-23 RX ADMIN — LACTULOSE 15 GRAM(S): 10 SOLUTION ORAL at 21:58

## 2020-10-23 RX ADMIN — Medication 100 MILLIGRAM(S): at 02:40

## 2020-10-23 RX ADMIN — Medication 100 MILLIGRAM(S): at 09:50

## 2020-10-23 RX ADMIN — SENNA PLUS 2 TABLET(S): 8.6 TABLET ORAL at 21:59

## 2020-10-23 RX ADMIN — GABAPENTIN 100 MILLIGRAM(S): 400 CAPSULE ORAL at 21:59

## 2020-10-23 RX ADMIN — TRAMADOL HYDROCHLORIDE 25 MILLIGRAM(S): 50 TABLET ORAL at 21:58

## 2020-10-23 RX ADMIN — Medication 100 GRAM(S): at 09:51

## 2020-10-23 RX ADMIN — Medication 1 TABLET(S): at 14:43

## 2020-10-23 RX ADMIN — TRAMADOL HYDROCHLORIDE 25 MILLIGRAM(S): 50 TABLET ORAL at 09:51

## 2020-10-23 RX ADMIN — TRAMADOL HYDROCHLORIDE 25 MILLIGRAM(S): 50 TABLET ORAL at 22:15

## 2020-10-23 RX ADMIN — GABAPENTIN 100 MILLIGRAM(S): 400 CAPSULE ORAL at 14:42

## 2020-10-23 RX ADMIN — CHLORHEXIDINE GLUCONATE 1 APPLICATION(S): 213 SOLUTION TOPICAL at 06:54

## 2020-10-23 NOTE — DISCHARGE NOTE PROVIDER - NSDCCPCAREPLAN_GEN_ALL_CORE_FT
PRINCIPAL DISCHARGE DIAGNOSIS  Diagnosis: Hip pain, right  Assessment and Plan of Treatment: Hip pain, right

## 2020-10-23 NOTE — OCCUPATIONAL THERAPY INITIAL EVALUATION ADULT - PERTINENT HX OF CURRENT PROBLEM, REHAB EVAL
80 year old male with history of alcoholic/WALL cirrhosis and hepatic encephalopathy, HTN, CKD3, RBBB, pseudogout, distant Guillain-Taylor syndrome, and recent admission from 8/19-8/24/20 for a right basicervical femoral neck fracture s/p right hip IMN (8/19/20) admitted for right proximal femur nonunion. Patient now s/p right hip PADMINI and conversion to SUNDEEP on 10/22/2020.

## 2020-10-23 NOTE — PHYSICAL THERAPY INITIAL EVALUATION ADULT - PERTINENT HX OF CURRENT PROBLEM, REHAB EVAL
s/p R hip IMN for basicervical hip fracture in August 19 2020 presents now with failure of hardware. Patient was found in Dr. Amaya office to have cutout of screw through the femoral head, hip pain and inability to ambulate. Patient was booked for removal of hardware and conversion to total hip arthroplasty on 10/22/2020 at [facility].

## 2020-10-23 NOTE — PROGRESS NOTE ADULT - PROBLEM SELECTOR PLAN 6
- SCr. 1.28, baseline between 1.1 and 1.3.   - Avoid NSAIDs and nephrotoxic agents, Renally dose meds  - Monitor Cr and urine output

## 2020-10-23 NOTE — DISCHARGE NOTE PROVIDER - NSDCMRMEDTOKEN_GEN_ALL_CORE_FT
ascorbic acid 500 mg oral tablet: 1 tab(s) orally once a day  calcium-vitamin D 500 mg-200 intl units (5 mcg) oral tablet: 1 tab(s) orally once a day  furosemide 20 mg oral tablet: 1 tab(s) orally once a day  pantoprazole 40 mg oral delayed release tablet: 1 tab(s) orally once a day  propranolol 10 mg oral tablet: 1 tab(s) orally 2 times a day   ascorbic acid 500 mg oral tablet: 1 tab(s) orally once a day  calcium-vitamin D 500 mg-200 intl units (5 mcg) oral tablet: 1 tab(s) orally once a day  furosemide 20 mg oral tablet: 1 tab(s) orally once a day  Lovenox 30 mg/0.3 mL injectable solution: 30 milligram(s) injectable every 12 hours MDD:60  Neurontin 100 mg oral capsule: 1 cap(s) orally 3 times a day MDD:3   oxyCODONE 5 mg oral tablet: 1 tab(s) orally every 6 hours, As Needed -Severe Pain (7 - 10) MDD:4  pantoprazole 40 mg oral delayed release tablet: 1 tab(s) orally once a day  propranolol 10 mg oral tablet: 1 tab(s) orally 2 times a day  Ultram 50 mg oral tablet: 0.5 tab(s) orally 3 times a day MDD:1.5

## 2020-10-23 NOTE — OCCUPATIONAL THERAPY INITIAL EVALUATION ADULT - GENERAL OBSERVATIONS, REHAB EVAL
Patient received semisupine in bed in NAD. +tele. +hemovac. +IV. Per NUNU Serra, patient okay to participate in OT evaluation.

## 2020-10-23 NOTE — OCCUPATIONAL THERAPY INITIAL EVALUATION ADULT - DIAGNOSIS, OT EVAL
s/p right proximal femur nonunion, s/p right total hip arthroplasty; decreased functional mobility, decreased ADL performance

## 2020-10-23 NOTE — DISCHARGE NOTE PROVIDER - CARE PROVIDER_API CALL
Trevor Foreman  ORTHOPAEDIC SURGERY  [address]  [place] [address]  Phone: [phone]  Fax: [phone]  Follow Up Time:

## 2020-10-23 NOTE — DISCHARGE NOTE NURSING/CASE MANAGEMENT/SOCIAL WORK - NSDCPNINST_GEN_ALL_CORE
dc to rehab. monitor for signs of chest pain, shortness of breath, uncontrolled pain, fever > 100.4, nausea/vomiting, or pus/bleeding from surgical incision site.

## 2020-10-23 NOTE — DISCHARGE NOTE NURSING/CASE MANAGEMENT/SOCIAL WORK - NSDCPNPNATDISSUGG_GEN_ALL_CORE
Patient called requesting mail order scripts on amlodipine, atorvastatin, fenofibrate, Hyzaar, Ranexa, and Brilinta to [facility].    Patient was discharged yesterday from [facility].  I printed discharge summary and no changes made to current meds.  Addition of Motrin and NTG SL per discharge summary.      
No

## 2020-10-23 NOTE — PROGRESS NOTE ADULT - PROBLEM SELECTOR PLAN 3
- MELD-Na 16, compensated at this time  - Esophageal varices: variceal bleed s/p banding (8/2018), last EGD in 4/2020 with trace varices. C/w propanolol with hold parameters.   - Hepatic encephalopathy: mental status at baseline, no asterixis on exam. C/w lactulose 15 g tid.  - Ascites: none. C/w PO Lasix 20 mg daily.   - HCC: Negative CT in 5/2019.  - hepatology following

## 2020-10-23 NOTE — PROGRESS NOTE ADULT - ASSESSMENT
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 MELD-Na 15 on 10/23/20. He is Child-Earl A6. Currently his volume status is optimized with no ascites on exam, no recent GIB. He remains high risk for the procedure due to his cirrhosis (7 day mortality ~2%, 30-day mortality ~7% per fonseca calculator)  - varices: prior variceal bleeding, last EGD 4/2020 w/ small varices  - ascites: none  - HCC: neg CT 4/2020  - HE: none, had some years ago, since then on lactulose once or twice daily for constipation    # anemia - acute on chronic anemia, drop in Hg today, no overt GIB, EBL perioperative 600c, will continue to monitor    # coagulopathy - patient's baseline INR ~1.6, likely from nutrient deficiency and cirrhosis (decreased factor production). Patient was given vitamin K pre-procedure, w/ improvement of INR to 1.1, now increased back to 1.5. Despite elevated INR, patient with cirrhosis at increased risk for thrombus, due to a) decreased production of antithrombin, protein C; b)increased production of vactor 8; c) increased vWF. This is counteracted by risk factors for bleeding, including decreased factor production (as indicated by elevated INR), decreased fibrinogen production (not measured this admission, so unclear patient's level), mild thrombocytopenia (though since >50, unlikely contributing), and anemia (unlikely contributing since Hct>25%). Identifying his exact bleeding/clotting risk difficult to determine. Testing such as thromboelastography (TEG) may help with this. At this point, for VTE ppx post-operatively, will recommend DVT prophylaxis     Recommendations:  - maintain hemodynamic stability to prevent ischemic liver disease  - would only transfuse blood products for plt<50 or Hg<7, in order not to worsen portal HTN  - early mobility  - standard VTE ppx as patient likely high risk for thrombus despite elevated INR  - check CMP, INR, and MELD-Na daily     Thank you for this interesting consult. Hepatology will continue to follow.     Andrew Seth, PGY4  Gastroenterology Fellow  Available on Microsoft Teams  [phone] ([employer] Short Range Pager)  [phone] (Long Range Pager)    After 5pm, please contact the on-call GI fellow. 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 MELD-Na 15 on 10/23/20. He is Child-Earl A6. Currently his volume status is optimized with no ascites on exam, no recent GIB. He remains high risk for the procedure due to his cirrhosis (7 day mortality ~2%, 30-day mortality ~7% per fonseca calculator)  - varices: prior variceal bleeding, last EGD 4/2020 w/ small varices  - ascites: none  - HCC: neg CT 4/2020  - HE: none, had some years ago, since then on lactulose once or twice daily for constipation    # anemia - acute on chronic anemia, drop in Hg today, no overt GIB, EBL perioperative 600c, will continue to monitor    # coagulopathy - patient's baseline INR ~1.6, likely from nutrient deficiency and cirrhosis (decreased factor production). Patient was given vitamin K pre-procedure, w/ improvement of INR to 1.1, now increased back to 1.5. Despite elevated INR, patient with cirrhosis at increased risk for thrombus, due to a) decreased production of antithrombin, protein C; b)increased production of vactor 8; c) increased vWF. This is counteracted by risk factors for bleeding, including decreased factor production (as indicated by elevated INR), decreased fibrinogen production (not measured this admission, so unclear patient's level), mild thrombocytopenia (though since >50, unlikely contributing), and anemia (unlikely contributing since Hct>25%). Identifying his exact bleeding/clotting risk difficult to determine. Testing such as thromboelastography (TEG) may help with this. At this point, for VTE ppx post-operatively, will recommend DVT prophylaxis     Recommendations:  - maintain hemodynamic stability to prevent ischemic liver disease  - would only transfuse blood products for plt<50 or Hg<7, in order not to worsen portal HTN  - early mobility  - standard VTE ppx as patient likely high risk for thrombus despite elevated INR, would recommend lovenox prophylaxis   - check CMP, INR, and MELD-Na daily     Thank you for this interesting consult. Hepatology will continue to follow.     Andrew Seth, PGY4  Gastroenterology Fellow  Available on Microsoft Teams  [phone] ([employer] Short Range Pager)  [phone] (Long Range Pager)    After 5pm, please contact the on-call GI fellow. [phone]

## 2020-10-23 NOTE — PROGRESS NOTE ADULT - SUBJECTIVE AND OBJECTIVE BOX
Orthopaedic Surgery Progress Note    Subjective:   Patient seen and examined  No acute events overnight  Pain well controlled  Denies cp sob f c    Objective:  T(C): 36.7 (10-23-20 @ 06:51), Max: 36.7 (10-22-20 @ 09:13)  HR: 60 (10-23-20 @ 06:51) (55 - 122)  BP: 134/51 (10-23-20 @ 06:51) (103/51 - 176/85)  RR: 18 (10-23-20 @ 06:51) (13 - 28)  SpO2: 100% (10-23-20 @ 06:51) (93% - 100%)  Wt(kg): --    10-22 @ 07:01  -  10-23 @ 07:00  --------------------------------------------------------  IN: 50 mL / OUT: 445 mL / NET: -395 mL        PE    NAD  RLE:   dressing C/D/I, HV intact ss output 220/220  motor intact GS/TA/EHL  SILT S/S/SP/DP  WWP                          11.5   10.66 )-----------( 237      ( 22 Oct 2020 18:30 )             38.7     10-22    138  |  106  |  13  ----------------------------<  171<H>  4.0   |  20<L>  |  1.17    Ca    8.4      22 Oct 2020 18:30  Phos  3.1     10-22  Mg     1.3     10-22    TPro  5.9<L>  /  Alb  3.2<L>  /  TBili  0.6  /  DBili  x   /  AST  21  /  ALT  8   /  AlkPhos  117  10-22    PT/INR - ( 22 Oct 2020 18:30 )   PT: 16.2 SEC;   INR: 1.45          PTT - ( 22 Oct 2020 18:30 )  PTT:34.0 SEC      80y Male s/p R hip PADMINI conversion to SUNDEEP  - Pain control  - FU cards regarding   - WBAT  - Posterior dislocation precautions/abduction pillow at all times  - PT/OT/OOB  - Hold VTE chemoprophylaxis d/t hepatic coagulopathy, venodynes  - Dispo planning

## 2020-10-23 NOTE — OCCUPATIONAL THERAPY INITIAL EVALUATION ADULT - REHAB POTENTIAL, OT EVAL
Subjective:       Patient ID: Raymond Stuart is a 75 y.o. male.     Chief Complaint: 6mo/lab     HTN: B/P good, no HTNive symptoms.    DM: no hyper/hypoglycemic symptoms. Am self monitoring BS- .  LIPIDS:somewhat following D&E, tolerating and compliant with med(s).    PVD/cardio: followed by Dr uJdge and now seeing Dr Chance through his [facility] office.   DM foot ulcer: seeing Dr Castillo .     LABS REVIEWED AND DISCUSSED WITH PATIENT      Review of Systems   Constitutional: Negative for fever and unexpected weight change.   HENT: Negative for congestion and rhinorrhea.    Eyes: Negative for discharge and redness.   Respiratory: Negative for cough and wheezing.    Gastrointestinal: Negative for constipation, diarrhea and vomiting.   Genitourinary: Negative for decreased urine volume and difficulty urinating.   Musculoskeletal: Negative for arthralgias and joint swelling.   Skin: Negative for rash and wound except bruises easily due to coumadin.   Neurological: Negative for syncope and headaches.   Psychiatric/Behavioral: Negative for behavioral problems and sleep disturbance.       Objective:      Physical Exam   Constitutional: He is oriented to person, place, and time. He appears well-developed and well-nourished. No distress.   Neck: Normal range of motion. No JVD present. No thyromegaly present.   Cardiovascular: Normal heart sounds.    No murmur heard.  Rate controlled but irregular.   Pulmonary/Chest: Effort normal and breath sounds normal. No respiratory distress. He has no wheezes. He has no rales.   Abdominal: Soft. He exhibits no distension and no mass. There is no tenderness. There is no rebound and no guarding. No hernia.   Musculoskeletal: He exhibits edema (trace).   Distal pulses not palpable.   Lymphadenopathy:     He has no cervical adenopathy.   Neurological: He is alert and oriented to person, place, and time. No cranial nerve deficit. Coordination normal.   Skin: Capillary refill takes less  than 2 seconds. No rash noted.   Psychiatric: He has a normal mood and affect. His behavior is normal. Judgment and thought content normal.       Assessment:       1. Type 2 diabetes mellitus with diabetic neuropathy, without long-term current use of insulin    2. Essential hypertension    3. Hyperlipidemia associated with type 2 diabetes mellitus    4. Permanent atrial fibrillation    5. PVD (peripheral vascular disease)    6. Osteomyelitis of toe of left foot    7. Type 2 diabetes mellitus with microalbuminuria, without long-term current use of insulin        Plan:      Meds reviewed, Keep subspecialty care, self monitor B/P and BS. D&E as tolerated, weight loss. F/U 6 months with labs. Flu vaccine.      good, to achieve stated therapy goals

## 2020-10-23 NOTE — PROGRESS NOTE ADULT - PROBLEM SELECTOR PLAN 1
- s/p R hip IMN on 8/19/20 now with right proximal femur nonunion   - now s/p R hip PADMINI conversion to SUNDEEP 10/22  - management per ortho, PT evaluation   - c/w pain control with Tramadol and Oxycodone PRN, but will need to be cautious with dosage given presence of cirrhosis

## 2020-10-23 NOTE — PHYSICAL THERAPY INITIAL EVALUATION ADULT - DISCHARGE DISPOSITION, PT EVAL
to improve strength and balance for safe ambulation and transfers to prevent future falls./rehabilitation facility

## 2020-10-23 NOTE — PROGRESS NOTE ADULT - SUBJECTIVE AND OBJECTIVE BOX
[facility] Division of Hospital Medicine  Jaimie Ramon MD  Pager (M-F, 8A-5P): [phone]  Other Times:  [phone]    Patient is a 80y old  Male who presents with a chief complaint of R hip fracture (23 Oct 2020 09:08)    SUBJECTIVE / OVERNIGHT EVENTS: Patient went into SVT post op, now doing well, no events on tele, denies chest pain, sob, palpitations     MEDICATIONS  (STANDING):  ascorbic acid 500 milliGRAM(s) Oral daily  calcium carbonate 1250 mG  + Vitamin D (OsCal 500 + D) 1 Tablet(s) Oral daily  furosemide    Tablet 20 milliGRAM(s) Oral daily  gabapentin 100 milliGRAM(s) Oral three times a day  influenza   Vaccine 0.5 milliLiter(s) IntraMuscular once  lactulose Syrup 15 Gram(s) Oral three times a day  pantoprazole    Tablet 40 milliGRAM(s) Oral before breakfast  polyethylene glycol 3350 17 Gram(s) Oral daily  propranolol 10 milliGRAM(s) Oral two times a day  senna 2 Tablet(s) Oral at bedtime  traMADol 25 milliGRAM(s) Oral three times a day    MEDICATIONS  (PRN):  bisacodyl Suppository 10 milliGRAM(s) Rectal daily PRN If no bowel movement by POD#2  magnesium hydroxide Suspension 30 milliLiter(s) Oral daily PRN Constipation  ondansetron Injectable 4 milliGRAM(s) IV Push every 6 hours PRN Nausea and/or Vomiting  oxyCODONE    IR 2.5 milliGRAM(s) Oral every 4 hours PRN Moderate Pain (4 - 6)  oxyCODONE    IR 5 milliGRAM(s) Oral every 4 hours PRN Severe Pain (7 - 10)      VITALS:  T(F): 98.6 (10-23-20 @ 07:39), Max: 98.6 (10-23-20 @ 07:39)  HR: 58 (10-23-20 @ 07:39) (55 - 122)  BP: 118/61 (10-23-20 @ 07:39) (103/51 - 176/85)  RR: 17 (10-23-20 @ 07:39) (13 - 28)  SpO2: 100% (10-23-20 @ 07:39)    CAPILLARY BLOOD GLUCOSE    PHYSICAL EXAM:  GENERAL: NAD, well-developed  EYES: EOMI, PERRLA, conjunctiva and sclera clear  CHEST/LUNG: Clear to auscultation bilaterally; No wheeze, normal respiratory rate  HEART: Regular rate and rhythm; No murmurs, rubs, or gallops, no LE edema  ABDOMEN: Soft, Nontender, Nondistended; Bowel sounds present  EXTREMITIES:  2+ Peripheral Pulses, No clubbing, cyanosis  PSYCH: AAOx2, calm   SKIN: No rashes or lesions    LABS:              9.8                  137  | 20   | 16           7.62  >-----------< 174     ------------------------< 121                   32.2                 3.5  | 103  | 1.28                                         Ca 8.2   Mg 1.5   Ph 2.7         TPro  5.8  /  Alb  2.9      TBili  0.3  /  DBili  x         AST  23  /  ALT  8             AlkPhos  100      INR: 1.50<H>;    PT: 16.9 SEC<H>;    PTT: 36.9 SEC<H>            RADIOLOGY & ADDITIONAL TESTS:  Imaging Personally Reviewed: [x] Yes    [ ] Consultant(s) Notes Reviewed:  [x] Care Discussed with Consultants/Other Providers: Orthopedic PA - discussed

## 2020-10-23 NOTE — PROGRESS NOTE ADULT - SUBJECTIVE AND OBJECTIVE BOX
Chief Complaint:  Patient is a 80y old  Male who presents with a chief complaint of R hip PADMINI/SUNDEEP (23 Oct 2020 07:10)      Interval Events: Underwent R hip total replacement yesterday, EBL 600cc, by report went into SVT in OR, sinus post-procedure. No new complaints this morning. AM labs notable for Decreased Hg (11.5->9.4)    Allergies:  No Known Allergies      Hospital Medications:  ascorbic acid 500 milliGRAM(s) Oral daily  bisacodyl Suppository 10 milliGRAM(s) Rectal daily PRN  calcium carbonate 1250 mG  + Vitamin D (OsCal 500 + D) 1 Tablet(s) Oral daily  ceFAZolin   IVPB 2000 milliGRAM(s) IV Intermittent every 8 hours  furosemide    Tablet 20 milliGRAM(s) Oral daily  gabapentin 100 milliGRAM(s) Oral three times a day  influenza   Vaccine 0.5 milliLiter(s) IntraMuscular once  lactulose Syrup 15 Gram(s) Oral three times a day  magnesium hydroxide Suspension 30 milliLiter(s) Oral daily PRN  magnesium sulfate  IVPB 1 Gram(s) IV Intermittent once  ondansetron Injectable 4 milliGRAM(s) IV Push every 6 hours PRN  oxyCODONE    IR 2.5 milliGRAM(s) Oral every 4 hours PRN  oxyCODONE    IR 5 milliGRAM(s) Oral every 4 hours PRN  pantoprazole    Tablet 40 milliGRAM(s) Oral before breakfast  polyethylene glycol 3350 17 Gram(s) Oral daily  propranolol 10 milliGRAM(s) Oral two times a day  senna 2 Tablet(s) Oral at bedtime  traMADol 25 milliGRAM(s) Oral three times a day        PHYSICAL EXAM:   Vital Signs:  Vital Signs Last 24 Hrs  T(C): 37 (23 Oct 2020 07:39), Max: 37 (23 Oct 2020 07:39)  T(F): 98.6 (23 Oct 2020 07:39), Max: 98.6 (23 Oct 2020 07:39)  HR: 58 (23 Oct 2020 07:39) (55 - 122)  BP: 118/61 (23 Oct 2020 07:39) (103/51 - 176/85)  BP(mean): 113 (22 Oct 2020 18:45) (93 - 117)  RR: 17 (23 Oct 2020 07:39) (13 - 28)  SpO2: 100% (23 Oct 2020 07:39) (93% - 100%)  Daily     Daily     GENERAL:  No acute distress  HEENT:  Normocephalic/atraumtic,  no scleral icterus  CHEST:  Clear to auscultation bilaterally, no wheezes/rales/ronchi, no accessory muscle use  HEART:  Regular rate and rhythm, no murmurs/rubs/gallops  ABDOMEN:  obese, soft, nontender, no r/g  EXTREMITIES:  No cyanosis, clubbing, or edema  SKIN:  No rash/erythema/ecchymoses/petechiae/wounds/abscess/warm/dry  NEURO:  Alert and oriented x 3, no asterixis, no tremor    LABS:                        9.8    7.62  )-----------( 174      ( 23 Oct 2020 07:04 )             32.2     Mean Cell Volume: 83.0 fL (10-23-20 @ 07:04)    10-23    137  |  103  |  16  ----------------------------<  121<H>  3.5   |  20<L>  |  1.28    Ca    8.2<L>      23 Oct 2020 07:04  Phos  2.7     10-23  Mg     1.5     10-23    TPro  5.8<L>  /  Alb  2.9<L>  /  TBili  0.3  /  DBili  x   /  AST  23  /  ALT  8   /  AlkPhos  100  10-23    LIVER FUNCTIONS - ( 23 Oct 2020 07:04 )  Alb: 2.9 g/dL / Pro: 5.8 g/dL / ALK PHOS: 100 u/L / ALT: 8 u/L / AST: 23 u/L / GGT: x           PT/INR - ( 23 Oct 2020 07:04 )   PT: 16.9 SEC;   INR: 1.50          PTT - ( 23 Oct 2020 07:04 )  PTT:36.9 SEC          Imaging:

## 2020-10-23 NOTE — PROGRESS NOTE ADULT - PROBLEM SELECTOR PLAN 4
- INR 1.5. Given presence of cirrhosis, pt likely with risks of hypo- and hypercoagulability  - s/p vitamin K and Kcentra preop per ortho

## 2020-10-23 NOTE — CONSULT NOTE ADULT - ASSESSMENT
? SVT   unavailable rhythm from OR to review  so far in sinus on tele   cont to monitor     s/p THR   pain control   DVT proph    Cirrhosis   would avoid Tramadol given high risk of orthostatic hypotension in this cirrhotic pt   on lasix, inderal , lactulose

## 2020-10-23 NOTE — DISCHARGE NOTE NURSING/CASE MANAGEMENT/SOCIAL WORK - PATIENT PORTAL LINK FT
You can access the FollowMyHealth Patient Portal offered by [facility] by registering at the following website: [email or web address]. By joining [employer]’s FollowMyHealth portal, you will also be able to view your health information using other applications (apps) compatible with our system.

## 2020-10-23 NOTE — DISCHARGE NOTE PROVIDER - NSDCCPTREATMENT_GEN_ALL_CORE_FT
PRINCIPAL PROCEDURE  Procedure: Right total hip arthroplasty  Findings and Treatment: 79 y/o Male with PMH of cirrhosis, HTN s/p R hip IMN for basicervical hip fracture in August 19, 2020 presented to [facility] with failure of hardware after was found to have cutout of screw through the femoral head in Dr. Foreman's office with hip pain and inability to ambulate. Pt is s/p right removal of hardware, and right total hip arthroplasty with Dr. Foreman on 10/22/2020 without any intraoperative complications. Prior to surgery, patient was seen by Medicine and GI/Hepatology inhouse for medical optimization due to elevated INR, recommendations were appreciated. Hepatology continued to follow patient, gave recommendations that Lovenox 30mg daily was appropriate for DVT prophylaxis. Pt is doing well and is stable for discharge per Dr. Foreman. Pt is tolerating physical therapy, WBAT with cane/walker with hip precautions. Leave dressing on until post-op office visit (POD #14). Have sutures/staples removed on POD #14 if present. Pt is on Lovenox 30mg subcutaneous daily for DVT prophylaxis, take for 6 weeks unless otherwise directed by surgeon. Follow up with your Primary Doctor in 1-2 weeks for continuity of care. Please follow up with Dr. Foreman in two weeks, call office to make appointment.      SECONDARY PROCEDURE  Procedure: Removal of intramedullary nail  Findings and Treatment:

## 2020-10-23 NOTE — PROGRESS NOTE ADULT - SUBJECTIVE AND OBJECTIVE BOX
ANESTHESIA POSTOP CHECK    80y Male POSTOP DAY 1 S/P Removal of Right Hip Hardware and conversion to Right total Hip Replacement    Vital Signs Last 24 Hrs  T(C): 36.3 (23 Oct 2020 11:40), Max: 37 (23 Oct 2020 07:39)  T(F): 97.3 (23 Oct 2020 11:40), Max: 98.6 (23 Oct 2020 07:39)  HR: 63 (23 Oct 2020 11:40) (55 - 122)  BP: 128/54 (23 Oct 2020 11:40) (103/51 - 176/85)  BP(mean): 113 (22 Oct 2020 18:45) (93 - 117)  RR: 18 (23 Oct 2020 11:40) (13 - 28)  SpO2: 100% (23 Oct 2020 11:40) (93% - 100%)  I&O's Summary    22 Oct 2020 07:01  -  23 Oct 2020 07:00  --------------------------------------------------------  IN: 50 mL / OUT: 445 mL / NET: -395 mL    23 Oct 2020 07:01  -  23 Oct 2020 15:12  --------------------------------------------------------  IN: 0 mL / OUT: 300 mL / NET: -300 mL        [X] NO APPARENT ANESTHESIA COMPLICATIONS      Comments:

## 2020-10-23 NOTE — DISCHARGE NOTE NURSING/CASE MANAGEMENT/SOCIAL WORK - NSDCPNDISPN_GEN_ALL_CORE
Side effects of pain management treatment/Education provided on the pain management plan of care/Activities of daily living, including home environment that might     exacerbate pain or reduce effectiveness of the pain management plan of care as well as strategies to address these issues/Safe use, storage and disposal of opioids when prescribed Side effects of pain management treatment/Activities of daily living, including home environment that might     exacerbate pain or reduce effectiveness of the pain management plan of care as well as strategies to address these issues/Safe use, storage and disposal of opioids when prescribed

## 2020-10-23 NOTE — CONSULT NOTE ADULT - SUBJECTIVE AND OBJECTIVE BOX
CHIEF COMPLAINT:Patient is a 80y old  Male who presents with a chief complaint of R hip PADMINI/SUNDEEP (23 Oct 2020 07:10)      HISTORY OF PRESENT ILLNESS:    80 male with history as below , had IMN for SUNDEEP in august now with hardware failure s/p SUNDEEP reported to have ?SVT during OR  no rhythms available / or printed to review from OR   so far on tele in sinus  pt denies any chest pain, sob, palpitation, dizziness or syncope.     PAST MEDICAL & SURGICAL HISTORY:  Porcelain gallbladder    Liver cirrhosis    Guillain-Washington syndrome    HTN (hypertension)    H/O inguinal hernia repair            MEDICATIONS:  furosemide    Tablet 20 milliGRAM(s) Oral daily  propranolol 10 milliGRAM(s) Oral two times a day    ceFAZolin   IVPB 2000 milliGRAM(s) IV Intermittent every 8 hours      gabapentin 100 milliGRAM(s) Oral three times a day  ondansetron Injectable 4 milliGRAM(s) IV Push every 6 hours PRN  oxyCODONE    IR 2.5 milliGRAM(s) Oral every 4 hours PRN  oxyCODONE    IR 5 milliGRAM(s) Oral every 4 hours PRN  traMADol 25 milliGRAM(s) Oral three times a day    bisacodyl Suppository 10 milliGRAM(s) Rectal daily PRN  lactulose Syrup 15 Gram(s) Oral three times a day  magnesium hydroxide Suspension 30 milliLiter(s) Oral daily PRN  pantoprazole    Tablet 40 milliGRAM(s) Oral before breakfast  polyethylene glycol 3350 17 Gram(s) Oral daily  senna 2 Tablet(s) Oral at bedtime      ascorbic acid 500 milliGRAM(s) Oral daily  calcium carbonate 1250 mG  + Vitamin D (OsCal 500 + D) 1 Tablet(s) Oral daily  influenza   Vaccine 0.5 milliLiter(s) IntraMuscular once      FAMILY HISTORY:  Family history of ovarian cancer (Sibling)        Non-contributory    SOCIAL HISTORY:    No tobacco, drugs or etoh    Allergies    No Known Allergies    Intolerances    	    REVIEW OF SYSTEMS:  as above  The rest of the 14 points ROS reviewed and except above they are unremarkable.        PHYSICAL EXAM:  T(C): 37 (10-23-20 @ 07:39), Max: 37 (10-23-20 @ 07:39)  HR: 58 (10-23-20 @ 07:39) (55 - 122)  BP: 118/61 (10-23-20 @ 07:39) (103/51 - 176/85)  RR: 17 (10-23-20 @ 07:39) (13 - 28)  SpO2: 100% (10-23-20 @ 07:39) (93% - 100%)  Wt(kg): --  I&O's Summary    22 Oct 2020 07:01  -  23 Oct 2020 07:00  --------------------------------------------------------  IN: 50 mL / OUT: 445 mL / NET: -395 mL    23 Oct 2020 07:01  -  23 Oct 2020 08:29  --------------------------------------------------------  IN: 0 mL / OUT: 100 mL / NET: -100 mL      JVP: Normal  Neck: supple  Lung: clear   CV: S1 S2 , Murmur:  Abd: soft  Ext: No edema  neuro: Awake / alert  Psych: flat affect  Skin: normal      LABS/DATA:    TELEMETRY: 	  Sinus   ECG:  	   	  CARDIAC MARKERS:            < from: TTE with Doppler (w/Cont) (08.20.20 @ 11:37) >  CONCLUSIONS:  1. Calcified trileaflet aortic valve with grossly  moderately decreased opening. Peak transaortic valve  gradient equals 15 mm Hg, mean transaortic valve gradient  equals 10 mm Hg. Minimal aortic regurgitation.  2. Normal left ventricular internal dimensions and wall  thicknesses.  3. Endocardium not well visualized; grossly normal left  ventricular systolic function. Endocardial visualization  enhanced with intravenous injection of echo contrast  (Definity).  4. Normal right ventricular size and function.  5. Estimated pulmonary artery systolic pressure equals 44  mm Hg, assuming right atrial pressure equals 10  mm Hg,  consistent with mild pulmonary hypertension.  ------------------------------------------------------------------------  Confirmed on  8/20/2020 - 13:07:00 by Grayson Cadena,    < end of copied text >                            9.8    7.62  )-----------( 174      ( 23 Oct 2020 07:04 )             32.2     10-23    137  |  103  |  16  ----------------------------<  121<H>  3.5   |  20<L>  |  1.28    Ca    8.2<L>      23 Oct 2020 07:04  Phos  2.7     10-23  Mg     1.5     10-23    TPro  5.8<L>  /  Alb  2.9<L>  /  TBili  0.3  /  DBili  x   /  AST  23  /  ALT  8   /  AlkPhos  100  10-23    proBNP:   Lipid Profile:   HgA1c:   TSH:

## 2020-10-23 NOTE — PHYSICAL THERAPY INITIAL EVALUATION ADULT - ACTIVE RANGE OF MOTION EXAMINATION, REHAB EVAL
posterior hip precautions maintained./bilateral  lower extremity Active ROM was WFL (within functional limits)/bilateral upper extremity Active ROM was WFL (within functional limits)

## 2020-10-23 NOTE — DISCHARGE NOTE PROVIDER - HOSPITAL COURSE
79 y/o Male with PMH of cirrhosis, HTN s/p R hip IMN for basicervical hip fracture in August 19, 2020 presented to [facility] with failure of hardware after was found to have cutout of screw through the femoral head in Dr. Foreman's office with hip pain and inability to ambulate. Pt is s/p right removal of hardware, and right total hip arthroplasty with Dr. Foreman on 10/22/2020 without any intraoperative complications. Prior to surgery, patient was seen by Medicine and GI/Hepatology inhouse for medical optimization due to elevated INR, recommendations were appreciated. Hepatology continued to follow patient, gave recommendations that Lovenox 30mg daily was appropriate for DVT prophylaxis. Pt is doing well and is stable for discharge per Dr. Foreman. Pt is tolerating physical therapy, WBAT with cane/walker with hip precautions. Leave dressing on until post-op office visit (POD #14). Have sutures/staples removed on POD #14 if present. Pt is on Lovenox 30mg subcutaneous daily for DVT prophylaxis, take for 6 weeks unless otherwise directed by surgeon. Follow up with your Primary Doctor in 1-2 weeks for continuity of care. Please follow up with Dr. Foreman in two weeks, call office to make appointment.

## 2020-10-23 NOTE — PHYSICAL THERAPY INITIAL EVALUATION ADULT - ADDITIONAL COMMENTS
Pt is poor historian, social history was taken from chart. Pt lives with his daughter and wife in private home, there are 3 steps to enter + flight to bedroom/bathroom. Pt was getting assistance with ADL prior to admission. Pt was using cane and walker prior to admission.

## 2020-10-24 VITALS
TEMPERATURE: 98 F | OXYGEN SATURATION: 100 % | DIASTOLIC BLOOD PRESSURE: 53 MMHG | SYSTOLIC BLOOD PRESSURE: 118 MMHG | HEART RATE: 63 BPM | RESPIRATION RATE: 16 BRPM

## 2020-10-24 LAB
ANION GAP SERPL CALC-SCNC: 11 MMO/L — SIGNIFICANT CHANGE UP (ref 7–14)
BUN SERPL-MCNC: 15 MG/DL — SIGNIFICANT CHANGE UP (ref 7–23)
CALCIUM SERPL-MCNC: 8 MG/DL — LOW (ref 8.4–10.5)
CHLORIDE SERPL-SCNC: 104 MMOL/L — SIGNIFICANT CHANGE UP (ref 98–107)
CO2 SERPL-SCNC: 22 MMOL/L — SIGNIFICANT CHANGE UP (ref 22–31)
CREAT SERPL-MCNC: 1.17 MG/DL — SIGNIFICANT CHANGE UP (ref 0.5–1.3)
GLUCOSE SERPL-MCNC: 112 MG/DL — HIGH (ref 70–99)
HCT VFR BLD CALC: 28.7 % — LOW (ref 39–50)
HGB BLD-MCNC: 9 G/DL — LOW (ref 13–17)
MCHC RBC-ENTMCNC: 25.4 PG — LOW (ref 27–34)
MCHC RBC-ENTMCNC: 31.4 % — LOW (ref 32–36)
MCV RBC AUTO: 80.8 FL — SIGNIFICANT CHANGE UP (ref 80–100)
NRBC # FLD: 0 K/UL — SIGNIFICANT CHANGE UP (ref 0–0)
PLATELET # BLD AUTO: 146 K/UL — LOW (ref 150–400)
PMV BLD: 9.8 FL — SIGNIFICANT CHANGE UP (ref 7–13)
POTASSIUM SERPL-MCNC: 3.1 MMOL/L — LOW (ref 3.5–5.3)
POTASSIUM SERPL-SCNC: 3.1 MMOL/L — LOW (ref 3.5–5.3)
RBC # BLD: 3.55 M/UL — LOW (ref 4.2–5.8)
RBC # FLD: 15.6 % — HIGH (ref 10.3–14.5)
SODIUM SERPL-SCNC: 137 MMOL/L — SIGNIFICANT CHANGE UP (ref 135–145)
WBC # BLD: 7.13 K/UL — SIGNIFICANT CHANGE UP (ref 3.8–10.5)
WBC # FLD AUTO: 7.13 K/UL — SIGNIFICANT CHANGE UP (ref 3.8–10.5)

## 2020-10-24 PROCEDURE — 99233 SBSQ HOSP IP/OBS HIGH 50: CPT

## 2020-10-24 RX ORDER — LACTULOSE 10 G/15ML
20 SOLUTION ORAL DAILY
Refills: 0 | Status: DISCONTINUED | OUTPATIENT
Start: 2020-10-24 | End: 2020-10-24

## 2020-10-24 RX ORDER — OXYCODONE HYDROCHLORIDE 5 MG/1
1 TABLET ORAL
Qty: 28 | Refills: 0
Start: 2020-10-24 | End: 2020-10-30

## 2020-10-24 RX ORDER — LACTULOSE 10 G/15ML
30 SOLUTION ORAL
Qty: 0 | Refills: 0 | DISCHARGE
Start: 2020-10-24

## 2020-10-24 RX ORDER — TRAMADOL HYDROCHLORIDE 50 MG/1
0.5 TABLET ORAL
Qty: 7.5 | Refills: 0
Start: 2020-10-24 | End: 2020-10-28

## 2020-10-24 RX ORDER — ENOXAPARIN SODIUM 100 MG/ML
30 INJECTION SUBCUTANEOUS
Qty: 1800 | Refills: 0
Start: 2020-10-24 | End: 2020-11-22

## 2020-10-24 RX ORDER — GABAPENTIN 400 MG/1
1 CAPSULE ORAL
Qty: 15 | Refills: 0
Start: 2020-10-24 | End: 2020-10-28

## 2020-10-24 RX ORDER — POTASSIUM CHLORIDE 20 MEQ
40 PACKET (EA) ORAL EVERY 4 HOURS
Refills: 0 | Status: DISCONTINUED | OUTPATIENT
Start: 2020-10-24 | End: 2020-10-24

## 2020-10-24 RX ADMIN — PANTOPRAZOLE SODIUM 40 MILLIGRAM(S): 20 TABLET, DELAYED RELEASE ORAL at 07:10

## 2020-10-24 RX ADMIN — TRAMADOL HYDROCHLORIDE 25 MILLIGRAM(S): 50 TABLET ORAL at 14:41

## 2020-10-24 RX ADMIN — Medication 40 MILLIEQUIVALENT(S): at 14:11

## 2020-10-24 RX ADMIN — TRAMADOL HYDROCHLORIDE 25 MILLIGRAM(S): 50 TABLET ORAL at 06:51

## 2020-10-24 RX ADMIN — LACTULOSE 20 GRAM(S): 10 SOLUTION ORAL at 14:08

## 2020-10-24 RX ADMIN — GABAPENTIN 100 MILLIGRAM(S): 400 CAPSULE ORAL at 14:07

## 2020-10-24 RX ADMIN — TRAMADOL HYDROCHLORIDE 25 MILLIGRAM(S): 50 TABLET ORAL at 05:28

## 2020-10-24 RX ADMIN — ENOXAPARIN SODIUM 30 MILLIGRAM(S): 100 INJECTION SUBCUTANEOUS at 14:08

## 2020-10-24 RX ADMIN — GABAPENTIN 100 MILLIGRAM(S): 400 CAPSULE ORAL at 05:22

## 2020-10-24 RX ADMIN — LACTULOSE 15 GRAM(S): 10 SOLUTION ORAL at 05:22

## 2020-10-24 RX ADMIN — Medication 1 TABLET(S): at 14:07

## 2020-10-24 RX ADMIN — Medication 500 MILLIGRAM(S): at 14:07

## 2020-10-24 RX ADMIN — TRAMADOL HYDROCHLORIDE 25 MILLIGRAM(S): 50 TABLET ORAL at 14:11

## 2020-10-24 RX ADMIN — Medication 20 MILLIGRAM(S): at 05:22

## 2020-10-24 NOTE — PROGRESS NOTE ADULT - SUBJECTIVE AND OBJECTIVE BOX
Subjective: Patient seen and examined. No new events except as noted.     SUBJECTIVE/ROS:    No chest pain, dyspnea, palpitation, or dizziness.     MEDICATIONS:  MEDICATIONS  (STANDING):  ascorbic acid 500 milliGRAM(s) Oral daily  calcium carbonate 1250 mG  + Vitamin D (OsCal 500 + D) 1 Tablet(s) Oral daily  enoxaparin Injectable 30 milliGRAM(s) SubCutaneous daily  furosemide    Tablet 20 milliGRAM(s) Oral daily  gabapentin 100 milliGRAM(s) Oral three times a day  influenza   Vaccine 0.5 milliLiter(s) IntraMuscular once  lactulose Syrup 15 Gram(s) Oral three times a day  pantoprazole    Tablet 40 milliGRAM(s) Oral before breakfast  polyethylene glycol 3350 17 Gram(s) Oral daily  propranolol 10 milliGRAM(s) Oral two times a day  senna 2 Tablet(s) Oral at bedtime  traMADol 25 milliGRAM(s) Oral three times a day      PHYSICAL EXAM:  T(C): 36.8 (10-24-20 @ 08:32), Max: 36.8 (10-23-20 @ 19:46)  HR: 59 (10-24-20 @ 08:32) (55 - 63)  BP: 130/60 (10-24-20 @ 08:32) (130/53 - 156/61)  RR: 18 (10-24-20 @ 08:32) (16 - 18)  SpO2: 100% (10-24-20 @ 08:32) (86% - 100%)  Wt(kg): --  I&O's Summary    23 Oct 2020 07:01  -  24 Oct 2020 07:00  --------------------------------------------------------  IN: 0 mL / OUT: 760 mL / NET: -760 mL    24 Oct 2020 07:01  -  24 Oct 2020 11:40  --------------------------------------------------------  IN: 0 mL / OUT: 350 mL / NET: -350 mL            JVP: Normal  Neck: supple  Lung: clear   CV: S1 S2 , Murmur:  Abd: soft  Ext: No edema  neuro: Awake / alert  Psych: flat affect  Skin: normal``    LABS/DATA:    CARDIAC MARKERS:                                9.0    7.13  )-----------( 146      ( 24 Oct 2020 06:45 )             28.7     10-24    137  |  104  |  15  ----------------------------<  112<H>  3.1<L>   |  22  |  1.17    Ca    8.0<L>      24 Oct 2020 06:45  Phos  2.7     10-23  Mg     1.5     10-23    TPro  5.8<L>  /  Alb  2.9<L>  /  TBili  0.3  /  DBili  x   /  AST  23  /  ALT  8   /  AlkPhos  100  10-23    proBNP:   Lipid Profile:   HgA1c:   TSH:     TELE:  EKG:

## 2020-10-24 NOTE — PROGRESS NOTE ADULT - ASSESSMENT
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 pylori infection s/p triple therapy (10/2019), pancreatitis 2/2 choledocholithiasis s/p ERCP with stone extraction and plastic stent placement (11/2019, stent now removed), hematochezia 2/2 colonic AVMs s/p cauterization and hemorrhoids (11/2019), melena 2/2 duodenal ulcers s/p APC (4/2020), and recent admission from 8/19-8/24/20 for a R basicervical femoral neck fracture s/p R hip IMN (8/19/20) now s/p R hip PADMINI conversion to SUNDEEP (10/22). Post op course c/b SVT now resolved.

## 2020-10-24 NOTE — PROGRESS NOTE ADULT - SUBJECTIVE AND OBJECTIVE BOX
Orthopaedic Surgery Progress Note    Subjective:   Patient seen and examined  No acute events overnight  Pain well controlled  Denies cp sob f c    Objective:  ICU Vital Signs Last 24 Hrs  T(C): 36.6 (24 Oct 2020 05:17), Max: 37 (23 Oct 2020 07:39)  T(F): 97.9 (24 Oct 2020 05:17), Max: 98.6 (23 Oct 2020 07:39)  HR: 58 (24 Oct 2020 05:17) (55 - 63)  BP: 134/70 (24 Oct 2020 05:17) (118/61 - 156/61)  BP(mean): --  ABP: --  ABP(mean): --  RR: 18 (24 Oct 2020 05:17) (16 - 18)  SpO2: 100% (24 Oct 2020 05:17) (86% - 100%)    PE    NAD  RLE:   dressing C/D/I, HV intact ss output  motor intact GS/TA/EHL  SILT S/S/SP/DP  WWP               80y Male s/p R hip PADMINI conversion to SUNDEEP, progressing well  - Pain control  - FU cards regarding   - WBAT  - Posterior dislocation precautions/abduction pillow at all times  - PT/OT/OOB  - Hold VTE chemoprophylaxis d/t hepatic coagulopathy, venodynes  - Dispo planning - Rehab 10/24

## 2020-10-24 NOTE — PROGRESS NOTE ADULT - PROBLEM SELECTOR PLAN 3
- MELD-Na 16, compensated at this time  - Esophageal varices: variceal bleed s/p banding (8/2018), last EGD in 4/2020 with trace varices. C/w propanolol with hold parameters.   - Hepatic encephalopathy: mental status at baseline, no asterixis on exam. C/w lactulose 15 g tid.  - Ascites: none. C/w PO Lasix 20 mg daily.   - HCC: Negative CT in 5/2019.  - hepatology following  -Replete mag and Potassium Goal: K= or >4

## 2020-10-24 NOTE — PROGRESS NOTE ADULT - SUBJECTIVE AND OBJECTIVE BOX
Patient is a 80y old  Male who presents with a chief complaint of R hip PADMINI/SUNDEEP (24 Oct 2020 06:15)      SUBJECTIVE / OVERNIGHT EVENTS: No complaints today.     MEDICATIONS  (STANDING):  ascorbic acid 500 milliGRAM(s) Oral daily  calcium carbonate 1250 mG  + Vitamin D (OsCal 500 + D) 1 Tablet(s) Oral daily  enoxaparin Injectable 30 milliGRAM(s) SubCutaneous daily  furosemide    Tablet 20 milliGRAM(s) Oral daily  gabapentin 100 milliGRAM(s) Oral three times a day  influenza   Vaccine 0.5 milliLiter(s) IntraMuscular once  lactulose Syrup 15 Gram(s) Oral three times a day  pantoprazole    Tablet 40 milliGRAM(s) Oral before breakfast  polyethylene glycol 3350 17 Gram(s) Oral daily  propranolol 10 milliGRAM(s) Oral two times a day  senna 2 Tablet(s) Oral at bedtime  traMADol 25 milliGRAM(s) Oral three times a day    MEDICATIONS  (PRN):  bisacodyl Suppository 10 milliGRAM(s) Rectal daily PRN If no bowel movement by POD#2  magnesium hydroxide Suspension 30 milliLiter(s) Oral daily PRN Constipation  ondansetron Injectable 4 milliGRAM(s) IV Push every 6 hours PRN Nausea and/or Vomiting  oxyCODONE    IR 2.5 milliGRAM(s) Oral every 4 hours PRN Moderate Pain (4 - 6)  oxyCODONE    IR 5 milliGRAM(s) Oral every 4 hours PRN Severe Pain (7 - 10)      Vital Signs Last 24 Hrs  T(C): 36.8 (24 Oct 2020 08:32), Max: 36.8 (23 Oct 2020 19:46)  T(F): 98.2 (24 Oct 2020 08:32), Max: 98.3 (23 Oct 2020 19:46)  HR: 59 (24 Oct 2020 08:32) (55 - 63)  BP: 130/60 (24 Oct 2020 08:32) (130/53 - 156/61)  BP(mean): --  RR: 18 (24 Oct 2020 08:32) (16 - 18)  SpO2: 100% (24 Oct 2020 08:32) (86% - 100%)  CAPILLARY BLOOD GLUCOSE      POCT Blood Glucose.: 145 mg/dL (23 Oct 2020 12:47)    I&O's Summary    23 Oct 2020 07:01  -  24 Oct 2020 07:00  --------------------------------------------------------  IN: 0 mL / OUT: 760 mL / NET: -760 mL    24 Oct 2020 07:01  -  24 Oct 2020 11:49  --------------------------------------------------------  IN: 0 mL / OUT: 350 mL / NET: -350 mL        PHYSICAL EXAM:  GENERAL: NAD, well-developed  HEAD:  Atraumatic, Normocephalic  EYES: EOMI, PERRLA, conjunctiva and sclera clear  NECK: Supple, No JVD  CHEST/LUNG: Clear to auscultation bilaterally; No wheeze  HEART: Regular rate and rhythm; No murmurs, rubs, or gallops  ABDOMEN: Soft, Nontender, obese; Bowel sounds present  EXTREMITIES:  2+ Peripheral Pulses, No clubbing, cyanosis, or edema  PSYCH: AAOx3  NEUROLOGY: non-focal  SKIN: No rashes or lesions    LABS:                        9.0    7.13  )-----------( 146      ( 24 Oct 2020 06:45 )             28.7     10-24    137  |  104  |  15  ----------------------------<  112<H>  3.1<L>   |  22  |  1.17    Ca    8.0<L>      24 Oct 2020 06:45  Phos  2.7     10-23  Mg     1.5     10-23    TPro  5.8<L>  /  Alb  2.9<L>  /  TBili  0.3  /  DBili  x   /  AST  23  /  ALT  8   /  AlkPhos  100  10-23    PT/INR - ( 23 Oct 2020 07:04 )   PT: 16.9 SEC;   INR: 1.50          PTT - ( 23 Oct 2020 07:04 )  PTT:36.9 SEC          RADIOLOGY & ADDITIONAL TESTS:    Imaging Personally Reviewed:    Consultant(s) Notes Reviewed:      Care Discussed with Consultants/Other Providers:

## 2020-11-06 ENCOUNTER — APPOINTMENT (OUTPATIENT)
Dept: HEPATOLOGY | Facility: CLINIC | Age: 80
End: 2020-11-06

## 2020-12-04 NOTE — ED CLERICAL - NS ED CLERK UNITS
5NOR/506 501/5NOR 507/5NOR [Maximal Pain Intensity: 0/10] : 0/10 [Least Pain Intensity: 0/10] : 0/10 [60: Requires occasional assistance, but is able to care for most of his/her needs] : 60: Requires occasional assistance, but is able to care for most of his/her needs [Date: ____________] : Patient's last distress assessment performed on [unfilled]. [2 - Distress Level] : Distress Level: 2

## 2021-01-06 RX ORDER — PANTOPRAZOLE 40 MG/1
40 TABLET, DELAYED RELEASE ORAL
Qty: 90 | Refills: 2 | Status: ACTIVE | COMMUNITY
Start: 2020-05-19 | End: 1900-01-01

## 2021-01-07 NOTE — PHYSICAL THERAPY INITIAL EVALUATION ADULT - AMBULATION SKILLS, REHAB EVAL
LM for patient that I was just calling back to reinforce that she can take both the Ibuprofen plus OTC Tylenol every 6 hours. In addition to that she can take the Tramadol if needed. Please call with any questions.   independent

## 2021-01-27 ENCOUNTER — APPOINTMENT (OUTPATIENT)
Dept: HEPATOLOGY | Facility: CLINIC | Age: 81
End: 2021-01-27

## 2021-01-29 ENCOUNTER — LABORATORY RESULT (OUTPATIENT)
Age: 81
End: 2021-01-29

## 2021-02-02 NOTE — PROVIDER CONTACT NOTE (OTHER) - ACTION/TREATMENT ORDERED:
Head, normocephalic, atraumatic, Face, Face within normal limits, Ears, External ears within normal limits, Nose/Nasopharynx, External nose  normal appearance, nares patent, no nasal discharge, Mouth and Throat, Oral cavity appearance normal, Breath odor normal, Lips, Appearance normal Will come up and see the patient.

## 2021-02-06 ENCOUNTER — INPATIENT (INPATIENT)
Facility: HOSPITAL | Age: 81
LOS: 11 days | Discharge: NURSING, REHAB OR LONG-TERM CARE | End: 2021-02-18
Attending: STUDENT IN AN ORGANIZED HEALTH CARE EDUCATION/TRAINING PROGRAM | Admitting: STUDENT IN AN ORGANIZED HEALTH CARE EDUCATION/TRAINING PROGRAM
Payer: MEDICARE

## 2021-02-06 VITALS
OXYGEN SATURATION: 100 % | SYSTOLIC BLOOD PRESSURE: 137 MMHG | HEIGHT: 69 IN | RESPIRATION RATE: 18 BRPM | TEMPERATURE: 98 F | DIASTOLIC BLOOD PRESSURE: 67 MMHG | HEART RATE: 90 BPM

## 2021-02-06 DIAGNOSIS — Z98.890 OTHER SPECIFIED POSTPROCEDURAL STATES: Chronic | ICD-10-CM

## 2021-02-06 DIAGNOSIS — R41.82 ALTERED MENTAL STATUS, UNSPECIFIED: ICD-10-CM

## 2021-02-06 LAB
ALBUMIN SERPL ELPH-MCNC: 3.3 G/DL — SIGNIFICANT CHANGE UP (ref 3.3–5)
ALP SERPL-CCNC: 516 U/L — HIGH (ref 40–120)
ALT FLD-CCNC: 51 U/L — HIGH (ref 4–41)
AMMONIA BLD-MCNC: 46 UMOL/L — SIGNIFICANT CHANGE UP (ref 11–55)
ANION GAP SERPL CALC-SCNC: 14 MMOL/L — SIGNIFICANT CHANGE UP (ref 7–14)
APPEARANCE UR: ABNORMAL
APTT BLD: 33.6 SEC — SIGNIFICANT CHANGE UP (ref 27–36.3)
AST SERPL-CCNC: 139 U/L — HIGH (ref 4–40)
BACTERIA # UR AUTO: ABNORMAL
BASOPHILS # BLD AUTO: 0.02 K/UL — SIGNIFICANT CHANGE UP (ref 0–0.2)
BASOPHILS NFR BLD AUTO: 0.1 % — SIGNIFICANT CHANGE UP (ref 0–2)
BILIRUB DIRECT SERPL-MCNC: 2.3 MG/DL — HIGH (ref 0–0.2)
BILIRUB INDIRECT FLD-MCNC: 2.4 MG/DL — HIGH (ref 0–1)
BILIRUB SERPL-MCNC: 4.7 MG/DL — HIGH (ref 0.2–1.2)
BILIRUB UR-MCNC: ABNORMAL
BLD GP AB SCN SERPL QL: NEGATIVE — SIGNIFICANT CHANGE UP
BLOOD GAS VENOUS COMPREHENSIVE RESULT: SIGNIFICANT CHANGE UP
BUN SERPL-MCNC: 28 MG/DL — HIGH (ref 7–23)
CALCIUM SERPL-MCNC: 9.5 MG/DL — SIGNIFICANT CHANGE UP (ref 8.4–10.5)
CHLORIDE SERPL-SCNC: 98 MMOL/L — SIGNIFICANT CHANGE UP (ref 98–107)
CO2 SERPL-SCNC: 24 MMOL/L — SIGNIFICANT CHANGE UP (ref 22–31)
COLOR SPEC: ABNORMAL
CREAT SERPL-MCNC: 1.3 MG/DL — SIGNIFICANT CHANGE UP (ref 0.5–1.3)
DIFF PNL FLD: ABNORMAL
EOSINOPHIL # BLD AUTO: 0.01 K/UL — SIGNIFICANT CHANGE UP (ref 0–0.5)
EOSINOPHIL NFR BLD AUTO: 0.1 % — SIGNIFICANT CHANGE UP (ref 0–6)
EPI CELLS # UR: 1 /HPF — SIGNIFICANT CHANGE UP (ref 0–5)
GLUCOSE SERPL-MCNC: 119 MG/DL — HIGH (ref 70–99)
GLUCOSE UR QL: NEGATIVE — SIGNIFICANT CHANGE UP
HCT VFR BLD CALC: 44.3 % — SIGNIFICANT CHANGE UP (ref 39–50)
HGB BLD-MCNC: 13.8 G/DL — SIGNIFICANT CHANGE UP (ref 13–17)
HYALINE CASTS # UR AUTO: 0 /LPF — SIGNIFICANT CHANGE UP (ref 0–7)
IANC: 11.92 K/UL — HIGH (ref 1.5–8.5)
IMM GRANULOCYTES NFR BLD AUTO: 1.7 % — HIGH (ref 0–1.5)
INR BLD: 1.63 RATIO — HIGH (ref 0.88–1.16)
KETONES UR-MCNC: NEGATIVE — SIGNIFICANT CHANGE UP
LEUKOCYTE ESTERASE UR-ACNC: ABNORMAL
LIDOCAIN IGE QN: 15 U/L — SIGNIFICANT CHANGE UP (ref 7–60)
LYMPHOCYTES # BLD AUTO: 0.76 K/UL — LOW (ref 1–3.3)
LYMPHOCYTES # BLD AUTO: 5.6 % — LOW (ref 13–44)
MCHC RBC-ENTMCNC: 23.2 PG — LOW (ref 27–34)
MCHC RBC-ENTMCNC: 31.2 GM/DL — LOW (ref 32–36)
MCV RBC AUTO: 74.5 FL — LOW (ref 80–100)
MONOCYTES # BLD AUTO: 0.55 K/UL — SIGNIFICANT CHANGE UP (ref 0–0.9)
MONOCYTES NFR BLD AUTO: 4.1 % — SIGNIFICANT CHANGE UP (ref 2–14)
NEUTROPHILS # BLD AUTO: 11.92 K/UL — HIGH (ref 1.8–7.4)
NEUTROPHILS NFR BLD AUTO: 88.4 % — HIGH (ref 43–77)
NITRITE UR-MCNC: NEGATIVE — SIGNIFICANT CHANGE UP
NRBC # BLD: 0 /100 WBCS — SIGNIFICANT CHANGE UP
NRBC # FLD: 0.02 K/UL — HIGH
PH UR: 6 — SIGNIFICANT CHANGE UP (ref 5–8)
PLATELET # BLD AUTO: 153 K/UL — SIGNIFICANT CHANGE UP (ref 150–400)
POTASSIUM SERPL-MCNC: 5.3 MMOL/L — SIGNIFICANT CHANGE UP (ref 3.5–5.3)
POTASSIUM SERPL-SCNC: 5.3 MMOL/L — SIGNIFICANT CHANGE UP (ref 3.5–5.3)
PROT SERPL-MCNC: 8 G/DL — SIGNIFICANT CHANGE UP (ref 6–8.3)
PROT UR-MCNC: ABNORMAL
PROTHROM AB SERPL-ACNC: 18.2 SEC — HIGH (ref 10.6–13.6)
RBC # BLD: 5.95 M/UL — HIGH (ref 4.2–5.8)
RBC # FLD: 21.5 % — HIGH (ref 10.3–14.5)
RBC CASTS # UR COMP ASSIST: 2 /HPF — SIGNIFICANT CHANGE UP (ref 0–4)
RH IG SCN BLD-IMP: POSITIVE — SIGNIFICANT CHANGE UP
SARS-COV-2 RNA SPEC QL NAA+PROBE: SIGNIFICANT CHANGE UP
SODIUM SERPL-SCNC: 136 MMOL/L — SIGNIFICANT CHANGE UP (ref 135–145)
SP GR SPEC: 1.03 — HIGH (ref 1.01–1.02)
UROBILINOGEN FLD QL: ABNORMAL
WBC # BLD: 13.49 K/UL — HIGH (ref 3.8–10.5)
WBC # FLD AUTO: 13.49 K/UL — HIGH (ref 3.8–10.5)
WBC UR QL: 298 /HPF — HIGH (ref 0–5)

## 2021-02-06 PROCEDURE — 99285 EMERGENCY DEPT VISIT HI MDM: CPT

## 2021-02-06 PROCEDURE — 99223 1ST HOSP IP/OBS HIGH 75: CPT | Mod: GC

## 2021-02-06 PROCEDURE — 74177 CT ABD & PELVIS W/CONTRAST: CPT | Mod: 26

## 2021-02-06 RX ORDER — GABAPENTIN 400 MG/1
100 CAPSULE ORAL THREE TIMES A DAY
Refills: 0 | Status: DISCONTINUED | OUTPATIENT
Start: 2021-02-06 | End: 2021-02-18

## 2021-02-06 RX ORDER — LACTULOSE 10 G/15ML
30 SOLUTION ORAL
Refills: 0 | Status: DISCONTINUED | OUTPATIENT
Start: 2021-02-06 | End: 2021-02-18

## 2021-02-06 RX ORDER — SODIUM CHLORIDE 9 MG/ML
1000 INJECTION INTRAMUSCULAR; INTRAVENOUS; SUBCUTANEOUS ONCE
Refills: 0 | Status: COMPLETED | OUTPATIENT
Start: 2021-02-06 | End: 2021-02-06

## 2021-02-06 RX ORDER — ASCORBIC ACID 60 MG
500 TABLET,CHEWABLE ORAL DAILY
Refills: 0 | Status: DISCONTINUED | OUTPATIENT
Start: 2021-02-06 | End: 2021-02-18

## 2021-02-06 RX ORDER — LACTULOSE 10 G/15ML
10 SOLUTION ORAL ONCE
Refills: 0 | Status: COMPLETED | OUTPATIENT
Start: 2021-02-06 | End: 2021-02-06

## 2021-02-06 RX ORDER — CEFTRIAXONE 500 MG/1
1000 INJECTION, POWDER, FOR SOLUTION INTRAMUSCULAR; INTRAVENOUS ONCE
Refills: 0 | Status: COMPLETED | OUTPATIENT
Start: 2021-02-06 | End: 2021-02-06

## 2021-02-06 RX ORDER — PANTOPRAZOLE SODIUM 20 MG/1
40 TABLET, DELAYED RELEASE ORAL
Refills: 0 | Status: DISCONTINUED | OUTPATIENT
Start: 2021-02-06 | End: 2021-02-18

## 2021-02-06 RX ORDER — PIPERACILLIN AND TAZOBACTAM 4; .5 G/20ML; G/20ML
3.38 INJECTION, POWDER, LYOPHILIZED, FOR SOLUTION INTRAVENOUS EVERY 8 HOURS
Refills: 0 | Status: DISCONTINUED | OUTPATIENT
Start: 2021-02-06 | End: 2021-02-07

## 2021-02-06 RX ORDER — FUROSEMIDE 40 MG
20 TABLET ORAL DAILY
Refills: 0 | Status: DISCONTINUED | OUTPATIENT
Start: 2021-02-06 | End: 2021-02-08

## 2021-02-06 RX ORDER — PIPERACILLIN AND TAZOBACTAM 4; .5 G/20ML; G/20ML
3.38 INJECTION, POWDER, LYOPHILIZED, FOR SOLUTION INTRAVENOUS ONCE
Refills: 0 | Status: COMPLETED | OUTPATIENT
Start: 2021-02-06 | End: 2021-02-06

## 2021-02-06 RX ORDER — ACETAMINOPHEN 500 MG
650 TABLET ORAL EVERY 8 HOURS
Refills: 0 | Status: DISCONTINUED | OUTPATIENT
Start: 2021-02-06 | End: 2021-02-18

## 2021-02-06 RX ORDER — FERROUS SULFATE 325(65) MG
325 TABLET ORAL DAILY
Refills: 0 | Status: DISCONTINUED | OUTPATIENT
Start: 2021-02-06 | End: 2021-02-07

## 2021-02-06 RX ADMIN — CEFTRIAXONE 100 MILLIGRAM(S): 500 INJECTION, POWDER, FOR SOLUTION INTRAMUSCULAR; INTRAVENOUS at 19:28

## 2021-02-06 RX ADMIN — SODIUM CHLORIDE 1000 MILLILITER(S): 9 INJECTION INTRAMUSCULAR; INTRAVENOUS; SUBCUTANEOUS at 19:29

## 2021-02-06 RX ADMIN — PIPERACILLIN AND TAZOBACTAM 200 GRAM(S): 4; .5 INJECTION, POWDER, LYOPHILIZED, FOR SOLUTION INTRAVENOUS at 21:43

## 2021-02-06 RX ADMIN — LACTULOSE 10 GRAM(S): 10 SOLUTION ORAL at 21:43

## 2021-02-06 NOTE — ED ADULT TRIAGE NOTE - CHIEF COMPLAINT QUOTE
c/o neck pain, body aches, c/p abd pain  and nausea, pt with hx of liver cirrhosis, esophageal bleeding, denies  bleeding with emesis, endorses fatigue, repros had Moderna vaccine on 1/27/20.

## 2021-02-06 NOTE — ED ADULT TRIAGE NOTE - PAIN RATING/NUMBER SCALE (0-10): ACTIVITY
Notes Recorded by Krupa Yousif LPN on 3/9/2112 at 6:78 AM  Verified patient with two identifiers.  Patient aware structurally normal heart per echo. 7

## 2021-02-06 NOTE — H&P ADULT - PROBLEM SELECTOR PLAN 1
Pt presented with leukocytosis to WBC 13.49k, subjective fevers/chills/weakness, and +UA, concern for UTI  - s/p CTX 1g x1 and Zosyn x1 in ED  - c/w Zosyn (2/6/21- )  - Tylenol PRN  - f/u UCx, BCx Pt presented with leukocytosis to WBC 13.49k, subjective fevers/chills/weakness, and +UA, likely 2/2 UTI  - s/p CTX 1g x1 and Zosyn x1 in ED  - c/w Zosyn (2/6/21- ); previous UCx in the past grew ESBL E.coli sensitive to Zosyn  - Tylenol PRN  - f/u UCx, BCx

## 2021-02-06 NOTE — H&P ADULT - PROBLEM SELECTOR PLAN 6
Hx of dementia; as per wife, AAOx2-3 with some confusion and memory issues at baseline.  - currently AAOx2, answering questions appropriately, no change in mental status as per wife  - if worsens, may be 2/2 infection vs hepatic encephalopathy (though ammonia wnl on admission)  - continue to monitor mental status Appears to be stable. 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 At admission, pt does not appear overloaded on exam.  - c/w home lactulose 30g BID  - c/w home propanolol 10mg BID (for varices)  - c/w home furosemide 20mg daily  - GI/Hepatology consulted by ED, f/u recs

## 2021-02-06 NOTE — ED PROVIDER NOTE - CARE PLAN
Principal Discharge DX:	AMS (altered mental status)  Secondary Diagnosis:	UTI (urinary tract infection)

## 2021-02-06 NOTE — ED ADULT NURSE NOTE - OBJECTIVE STATEMENT
pt in rm  abd area distended c/o body aches all over/  pt confused at times  calling for wife . iv placed in rt arm  labs sent off/ pt swabbed for covid

## 2021-02-06 NOTE — H&P ADULT - NSHPSOCIALHISTORY_GEN_ALL_CORE
Wife reports that pt is a never smoker, but previously was a heavy alcohol drinker (3-4 bottles of hard liquor per week, quit in 5/2018). No illicit/recreational drug use. At baseline, able to ambulate with walker.

## 2021-02-06 NOTE — H&P ADULT - PROBLEM SELECTOR PLAN 5
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 At admission, pt does not appear overloaded on exam.  - c/w home lactulose 30g BID  - c/w home furosemide 20mg daily  - GI/Hepatology consulted by ED, f/u recs Pt reports baseline SOB, but states it has worsened with productive cough, subjective fevers/chills, and found to have leukocytosis.  - currently satting well on RA  - f/u CXR to r/o PNA Pt reports baseline SOB, but states it has worsened with productive cough, subjective fevers/chills, and found to have leukocytosis.  - TTE (8/20/20): EF 72%, grossly normal LV/RV systolic function; mild pulmHTN  - currently satting well on RA, continue to monitor respiratory status  - f/u CXR to r/o PNA

## 2021-02-06 NOTE — H&P ADULT - NSHPREVIEWOFSYSTEMS_GEN_ALL_CORE
REVIEW OF SYSTEMS:    CONSTITUTIONAL: +weakness, +subjective fevers and chills  EYES/ENT: No visual changes  RESPIRATORY: +occasional cough w/ mucous, +loss of taste; +baseline mild SOB but slightly worsened; No wheezing, hemoptysis  CARDIOVASCULAR: lateral pain/soreness on sides of chest b/l; No palpitations  GASTROINTESTINAL: +diffuse abdominal pain; +NBNB emesis (1 episode) yesterday; +constipation; +dark stools; No nausea or hematemesis; No diarrhea; No hematochezia.  GENITOURINARY: No dysuria, frequency or hematuria  NEUROLOGICAL: No numbness or weakness  SKIN: No itching, burning, rashes, or lesions   All other review of systems is negative unless indicated above.

## 2021-02-06 NOTE — H&P ADULT - PROBLEM SELECTOR PLAN 8
- DVT ppx: Lovenox  - Diet: DASH/TLC  - Dispo: pending medical optimization Hx of HTN  - not on any home antihypertensives aside from propanolol 10mg BID (for varices)  - continue to monitor BP

## 2021-02-06 NOTE — H&P ADULT - PROBLEM SELECTOR PLAN 10
Transitions of Care Status:  1.  Name of PCP: Dr. Anthony Mauricio  2.  PCP Contacted on Admission: [ ] Y    [ ] N    3.  PCP contacted at Discharge: [ ] Y    [ ] N    [ ] N/A  4.  Post-Discharge Appointment Date and Location:  5.  Summary of Handoff given to PCP:

## 2021-02-06 NOTE — ED PROVIDER NOTE - PHYSICAL EXAMINATION
General: elderly man in no acute distress  Head: normocephalic, atraumatic  Eyes: clear eyes  Mouth: moist mucous membranes  Neck: supple neck  CV: normal rate and rhythm, peripheral pulses 2+ bilateral UE and LE  Respiratory: clear to auscultation bilaterally  Abdomen: soft, nondistended, diffuse tenderness to palpation  MSK: no joint deformities  Neuro: alert and oriented x 2, speech clear, moving all extremities without difficulty, no asterixis  Skin: no rash noted, no jaundice

## 2021-02-06 NOTE — ED PROVIDER NOTE - PROGRESS NOTE DETAILS
Nieves Arguello, resident MD: no fluid collection/ascites noted on bedside ultrasound. with elevated wbc and abdominal pain, will empirically treat with ceftriaxone. Jose Pepe MD, PGY3: Patient received at resident sign out. No panctuarable pocket with bedside u/s, ams with possible uti or cholangitis, admission to hospitalist. given potential chlangitis, needs urgent GI consult. paged GI, and emailed titled "urgent".

## 2021-02-06 NOTE — H&P ADULT - PROBLEM SELECTOR PLAN 4
Pt's wife reported to episodes of "dark stools" in past 2-3 days. 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  - follows with Hepatologist Dr. Mcgraw outpatient  - Hgb wnl on admission  - continue to monitor CBC and BMs  - c/w home pantoprazole 40mg PO daily  - GI/Hepatology consulted by ED, f/u recs

## 2021-02-06 NOTE — ED PROVIDER NOTE - OBJECTIVE STATEMENT
81yo man PMH cirrhosis c/b esophageal varices s/p banding, HTN, arthritis presents with abdominal pain and episodes of nonbloody emesis yesterday. Pt complains of diffuse pain everywhere but is unable to provide specifics.  Spoke with pt's wife (Shannon [phone]) who states that he is confused at baseline with no change in mental status recently but yesterday was having abdominal discomfort and was unable to tolerate PO and continued to have symptoms today and sent him to the ED for evaluation. He had no blood in vomit but has noticed some dark stools for the past 3 days. No recent fevers. No cough, SOB. No rash. No sick contacts. He recently had the moderna covid vaccine 1/27/21. 79yo man PMH cirrhosis c/b esophageal varices s/p banding, HTN, arthritis presents with abdominal pain and episodes of nonbloody emesis yesterday. Pt complains of diffuse pain everywhere but is unable to provide specifics.  Spoke with pt's wife (Shannon [phone]) who states that he is confused at baseline with no change in mental status recently but yesterday was having abdominal discomfort and was unable to tolerate PO and continued to have symptoms today and sent him to the ED for evaluation. He had no blood in vomit but has noticed some dark stools for the past 3 days. No recent fevers. No cough, SOB. No rash. No sick contacts. He recently had the moderna covid vaccine 1/27/21.  PCP: Anthony Tinsley  Hepatologist: Dr. Mcgraw

## 2021-02-06 NOTE — CHART NOTE - NSCHARTNOTEFT_GEN_A_CORE
Confidential Drug Utilization Report    Search Terms: Anthony Florez, 1940   Search Date: 02/06/2021 23:53:39 PM     The Drug Utilization Report below displays all of the controlled substance prescriptions, if any, that your patient has filled in the last twelve months. The information displayed on this report is compiled from pharmacy submissions to the Department, and accurately reflects the information as submitted by the pharmacies.    This report was requested by:Jeffrey Ospina|Reference #:[id number]     Others' Prescriptions    Patient Name:Anthony Florez   YOB: 1940   Address:[address] [place] [address] [place] [address]   Sex:Male     Rx Written | Rx Dispensed | Drug | Quantity | Days Supply | Prescriber Name    11/29/2020 11/29/2020 oxycodone hcl 5 mg tablet  20 5 Lula Goetz     11/10/2020 11/10/2020 oxycodone hcl 5 mg tablet  12 3 Denzel Ceja C Jr M D     11/06/2020 11/06/2020 tramadol hcl 50 mg tablet  7 7 ElagioDenzel C Jr M D     10/29/2020 10/29/2020 tramadol hcl 50 mg tablet  11 7 ElagioLukeo C Jr M D     10/29/2020 10/29/2020 oxycodone hcl 5 mg tablet  10 2 ElagiDenzel mckee C Jr M D     10/27/2020 10/27/2020 oxycodone hcl 5 mg tablet  10 2 ElagiDenzel mckee C Jr M D     10/24/2020 10/24/2020 tramadol hcl 50 mg tablet  11 7 ElagiDenzel mckee C Jr M D     10/24/2020 10/24/2020 oxycodone hcl 5 mg tablet  6 2 ElagioDenzel C Jr M D     10/07/2020 10/07/2020 hydromorphone 4 mg tablet  30 5 Lula Goetz     09/25/2020 09/25/2020 hydromorphone 4 mg tablet  10 10 Ashu España)     09/25/2020 09/25/2020 hydromorphone 4 mg tablet  18 3 Ashu España)     09/21/2020 09/21/2020 hydromorphone 4 mg tablet  10 2 Ashu España)     09/14/2020 09/14/2020 hydromorphone 2 mg tablet  12 2 Ashu España)     09/11/2020 09/11/2020 oxycodone hcl 5 mg tablet  30 5 Ashu España)     09/03/2020 09/03/2020 oxycodone hcl er 10 mg tablet  20 10 IoAshu peryr)     08/30/2020 08/30/2020 oxycodone hcl 5 mg tablet  30 5 Lula Goetz     08/24/2020 08/24/2020 oxycodone hcl 5 mg tablet  10 2 Ashu España)     08/24/2020 08/24/2020 oxycodone hcl 5 mg tablet  6 2 Ashu España)           Patient Name:Anthony Florez   YOB: 1940   Address:[address] [place] [address]   Sex:Male     Rx Written | Rx Dispensed | Drug | Quantity | Days Supply | Prescriber Name      12/09/2020 12/15/2020 oxycodone hcl 5 mg tablet  28 7 Denzel Ceja Jr     10/14/2020 10/21/2020 hydromorphone 2 mg tablet  30 5 Ashu España)         * - Drugs marked with an asterisk are compound drugs. If the compound drug is made up of more than one controlled substance, then each controlled substance will be a separate row in the table.

## 2021-02-06 NOTE — H&P ADULT - PROBLEM SELECTOR PLAN 9
Transitions of Care Status:  1.  Name of PCP: Dr. Anthony Mauricio  2.  PCP Contacted on Admission: [ ] Y    [ ] N    3.  PCP contacted at Discharge: [ ] Y    [ ] N    [ ] N/A  4.  Post-Discharge Appointment Date and Location:  5.  Summary of Handoff given to PCP: - DVT ppx: Lovenox  - Diet: DASH/TLC  - Dispo: pending medical optimization - DVT ppx: Lovenox  - Diet: DASH/TLC  - Dispo: pending medical optimization  - GOC: spoke to wife regarding GOC, stated pt is FULL CODE

## 2021-02-06 NOTE — H&P ADULT - NSHPPHYSICALEXAM_GEN_ALL_CORE
Vital Signs Last 24 Hrs  T(C): 37.8 (06 Feb 2021 20:04), Max: 37.8 (06 Feb 2021 20:04)  T(F): 100.1 (06 Feb 2021 20:04), Max: 100.1 (06 Feb 2021 20:04)  HR: 88 (06 Feb 2021 20:04) (88 - 90)  BP: 154/67 (06 Feb 2021 20:04) (137/67 - 154/67)  BP(mean): --  RR: 16 (06 Feb 2021 20:04) (16 - 18)  SpO2: 100% (06 Feb 2021 20:04) (99% - 100%)    GENERAL: NAD, well-developed, laying comfortably in bed  EYES: Sclera clear, PERRL  CHEST/LUNG: Crackles to mid-lung fields b/l; No wheezes  HEART: Regular rate and rhythm; No murmurs, rubs, or gallops  ABDOMEN: Bowel sounds present; Soft, Nondistended, +mild TTP diffusely mostly in RUQ  EXTREMITIES:  2+ Peripheral Pulses, No clubbing, cyanosis, or edema  PSYCH: AAOx2 (oriented to self and place, not time); confused and has memory problems at baseline as per wife  NEUROLOGY: non-focal  SKIN: No rashes or lesions Vital Signs Last 24 Hrs  T(C): 37.8 (06 Feb 2021 20:04), Max: 37.8 (06 Feb 2021 20:04)  T(F): 100.1 (06 Feb 2021 20:04), Max: 100.1 (06 Feb 2021 20:04)  HR: 88 (06 Feb 2021 20:04) (88 - 90)  BP: 154/67 (06 Feb 2021 20:04) (137/67 - 154/67)  BP(mean): --  RR: 16 (06 Feb 2021 20:04) (16 - 18)  SpO2: 100% (06 Feb 2021 20:04) (99% - 100%)    GENERAL: NAD, well-developed, laying comfortably in bed  EYES: Sclera clear, PERRL  CHEST/LUNG: Crackles to mid-lung fields b/l; No wheezes  HEART: Regular rate and rhythm; No murmurs, rubs, or gallops  ABDOMEN: Bowel sounds present; Soft, Nondistended, +mild TTP diffusely mostly in RUQ  BACK: negative CVA tenderness b/l  EXTREMITIES:  2+ Peripheral Pulses, No clubbing, cyanosis, or edema  PSYCH: AAOx2 (oriented to self and place, not time); confused and has memory problems at baseline as per wife  NEUROLOGY: non-focal  SKIN: No rashes or lesions

## 2021-02-06 NOTE — H&P ADULT - PROBLEM SELECTOR PLAN 3
Pt presented with diffuse mild abdominal pain, worse in RUQ, 1x NBNB emesis, leukocytosis, subjective fevers/chills. Pt has known to have extensive GI hx.  - CT A/P w/ IV con (2/6/21): Mild intrahepatic biliary ductal dilation and irregular gallbladder wall  - Unclear etiology, possibly 2/2 cholangitis vs constipation vs gastroenteritis    - GI/Hepatology consulted by ED, f/u recs

## 2021-02-06 NOTE — H&P ADULT - ASSESSMENT
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 Found to have UTI and possible cholangitis.

## 2021-02-06 NOTE — ED PROVIDER NOTE - CLINICAL SUMMARY MEDICAL DECISION MAKING FREE TEXT BOX
80yoM presents with nonbloody emesis and abdominal pain x 2 days with diffuse abdominal tenderness on exam. Possible viral syndrome/gastroenteritis vs pancreatitis vs diverticulitis. Will consider SBP but not febrile at this time. Will obtain blood work and CT a/p to evaluate for intra-abdominal pathology. Will give fluids and reassess.

## 2021-02-06 NOTE — H&P ADULT - NSICDXPASTMEDICALHX_GEN_ALL_CORE_FT
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

## 2021-02-06 NOTE — H&P ADULT - ATTENDING COMMENTS
Pt with continued vomiting.  Continued pain in lower chest/upper abdomen attributed to son carrying him up stairs.   Will continue Zosyn for UTI and possible cholangitis  GI consult and MRCP  f/u culture

## 2021-02-06 NOTE — ED ADULT NURSE REASSESSMENT NOTE - NS ED NURSE REASSESS COMMENT FT1
received report from ezekiel EDDY. Pt is a/o x 2 no complaints of chest pain, headache, nausea, dizzniness, vomiting, SOB, fever, chills   verbalized. Pt has 20g iv placed to left forearm with no redness or swelling noted. Awaiting further orders. Will continue to monitor.

## 2021-02-06 NOTE — H&P ADULT - PROBLEM SELECTOR PLAN 7
Hx of HTN  - not on any home antihypertensives aside from propanolol 10mg BID (for varices)  - continue to monitor BP Hx of dementia; as per wife, AAOx2-3 with some confusion and memory issues at baseline.  - currently AAOx2, answering questions appropriately, no change in mental status as per wife  - if worsens, may be 2/2 infection vs hepatic encephalopathy (though ammonia wnl on admission)  - continue to monitor mental status

## 2021-02-06 NOTE — H&P ADULT - NSHPLABSRESULTS_GEN_ALL_CORE
LABS:                        13.8   13.49 )-----------( 153      ( 2021 17:16 )             44.3         136  |  98  |  28<H>  ----------------------------<  119<H>  5.3   |  24  |  1.30    Ca    9.5      2021 17:16    TPro  8.0  /  Alb  3.3  /  TBili  4.7<H>  /  DBili  2.3<H>  /  AST  139<H>  /  ALT  51<H>  /  AlkPhos  516<H>      PT/INR - ( 2021 17:16 )   PT: 18.2 sec;   INR: 1.63 ratio         PTT - ( 2021 17:16 )  PTT:33.6 sec      Urinalysis Basic - ( 2021 19:42 )    Color: Clarice / Appearance: Slightly Turbid / S.030 / pH: x  Gluc: x / Ketone: Negative  / Bili: Small / Urobili: 3 mg/dL   Blood: x / Protein: 30 mg/dL / Nitrite: Negative   Leuk Esterase: Large / RBC: 2 /HPF /  /HPF   Sq Epi: x / Non Sq Epi: 1 /HPF / Bacteria: Many          RADIOLOGY & ADDITIONAL TESTS:  < from: CT Abdomen and Pelvis w/ IV Cont (21 @ 18:44) >  IMPRESSION:  Mild intrahepatic biliary ductal dilation and irregular gallbladder wall, new since 2020 with interval removal of pancreatic duct stent. Correlation with HIDA scan can be performed for further evaluation.  < end of copied text > LABS:                        13.8   13.49 )-----------( 153      ( 2021 17:16 )             44.3         136  |  98  |  28<H>  ----------------------------<  119<H>  5.3   |  24  |  1.30    Ca    9.5      2021 17:16    TPro  8.0  /  Alb  3.3  /  TBili  4.7<H>  /  DBili  2.3<H>  /  AST  139<H>  /  ALT  51<H>  /  AlkPhos  516<H>      PT/INR - ( 2021 17:16 )   PT: 18.2 sec;   INR: 1.63 ratio         PTT - ( 2021 17:16 )  PTT:33.6 sec      Urinalysis Basic - ( 2021 19:42 )    Color: Clarice / Appearance: Slightly Turbid / S.030 / pH: x  Gluc: x / Ketone: Negative  / Bili: Small / Urobili: 3 mg/dL   Blood: x / Protein: 30 mg/dL / Nitrite: Negative   Leuk Esterase: Large / RBC: 2 /HPF /  /HPF   Sq Epi: x / Non Sq Epi: 1 /HPF / Bacteria: Many          RADIOLOGY & ADDITIONAL TESTS:  < from: CT Abdomen and Pelvis w/ IV Cont (21 @ 18:44) >  IMPRESSION:  Mild intrahepatic biliary ductal dilation and irregular gallbladder wall, new since 2020 with interval removal of pancreatic duct stent. Correlation with HIDA scan can be performed for further evaluation.  < end of copied text >    EKG (21): NSR w/ bifasciular block, QTc 542

## 2021-02-06 NOTE — H&P ADULT - PROBLEM SELECTOR PLAN 2
Pt presented with RUQ pain, leukocytosis, subjective fevers/chills, elevated T.bili 4.7, direct 2.3 and indirect 2.4. Pt has hx of porcelain gallbladder (but 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 in the past).  - CT A/P w/ IV con (2/6/21): Mild intrahepatic biliary ductal dilation and irregular gallbladder wall, new since 4/1/2020 with interval removal of pancreatic duct stent.  - Concern for possible cholangitis  - f/u BCx  - c/w Zosyn (2/6/21- )  - MRCP ordered  - GI/Hepatology consulted by ED, f/u recs Pt presented with RUQ pain, leukocytosis, subjective fevers/chills, elevated T.bili 4.7, direct 2.3 and indirect 2.4, Alk phos 516, , ALT 51. Pt has hx of porcelain gallbladder (but 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 in the past).  - CT A/P w/ IV con (2/6/21): Mild intrahepatic biliary ductal dilation and irregular gallbladder wall, new since 4/1/2020 with interval removal of pancreatic duct stent.  - Concern for possible cholangitis  - f/u BCx  - c/w Zosyn (2/6/21- )  - MRCP ordered  - GI/Hepatology consulted by ED, f/u recs

## 2021-02-06 NOTE — H&P ADULT - HISTORY OF PRESENT ILLNESS
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 Spoke to wife Shannon (retired pathologist at [employer]) for collateral information. Patient is AAOx2 (oriented to self and place) and able to answer questions, but as per wife, he tends to be confused at baseline, particularly with memory issues, but no overt change in mental status recently. As per ED documentation by RN, pt was confused and calling for wife at times. Pt reports having abdominal pain (diffuse but mostly in RUQ), weakness, dizziness, feeling subjective fevers and chills. Also notes body aches including pain/"soreness" in lateral side of b/l ribs and in b/l legs. He attributes this to his son-in-law holding him up when transporting him here. Additionally reports worsening SOB, occasional cough with mucous, and loss of taste. Had 1st dose of Moderna COVID-19 vaccine on 1/27/21. Denies sick contacts at home.     In the ED: Tmax 100.1F rectally, HR 88-90, SBP 130s-150s, RR 16-18, sating % on RA. Labs notable for WBC 13.49, K 5.3 (severely hemolyzed), T. bili 4.7 (direct 2.3, indirect 2.4). Ammonia 46 and Hgb 13.8. UA revealed many bacteria, +WBC, +LE, -nitrite. Given CTX 1g x1, Zosyn x1, lactulose 10g x1, and 1L NS bolus. Admitted to Medicine for further management. 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 Spoke to wife Shannon (retired pathologist at [employer]) for collateral information. Patient is AAOx2 (oriented to self and place) and able to answer questions, but as per wife, he tends to be confused at baseline, particularly with memory issues, but no overt change in mental status recently. As per ED documentation by RN, pt was confused and calling for wife at times. Pt reports having abdominal pain (diffuse but mostly in RUQ), weakness, dizziness, feeling subjective fevers and chills. Also notes body aches including pain/"soreness" in lateral side of b/l ribs and in b/l legs. He attributes this to his son-in-law holding him up when transporting him here. Additionally reports worsening SOB, occasional cough with mucous, and loss of taste. Had 1st dose of Moderna COVID-19 vaccine on 1/27/21. Denies sick contacts at home.     In the ED: Tmax 100.1F rectally, HR 88-90, SBP 130s-150s, RR 16-18, sating % on RA. Labs notable for WBC 13.49, K 5.3 (severely hemolyzed), T. bili 4.7 (direct 2.3, indirect 2.4), Alk Phos 516, , ALT 51, lactate 3.2. Of note, WNL: Ammonia 46 and Hgb 13.8. UA revealed many bacteria, +WBC, +LE, -nitrite. Given CTX 1g x1, Zosyn x1, lactulose 10g x1, and 1L NS bolus. Admitted to Medicine for further management. 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 Spoke to wife Shannon (retired pathologist) for collateral information. Patient is AAOx2 (oriented to self and place) and able to answer questions, but as per wife, he tends to be confused at baseline, particularly with memory issues, but no overt change in mental status recently. As per ED documentation by RN, pt was confused and calling for wife at times. Pt reports having abdominal pain (diffuse but mostly in RUQ), weakness, dizziness, feeling subjective fevers and chills. Also notes body aches including pain/"soreness" in lateral side of b/l ribs and in b/l legs. He attributes this to his son-in-law holding him up when transporting him here. Additionally reports worsening SOB, occasional cough with mucous, and loss of taste. Had 1st dose of Moderna COVID-19 vaccine on 1/27/21. Denies sick contacts at home.     In the ED: Tmax 100.1F rectally, HR 88-90, SBP 130s-150s, RR 16-18, sating % on RA. Labs notable for WBC 13.49, K 5.3 (severely hemolyzed), T. bili 4.7 (direct 2.3, indirect 2.4), Alk Phos 516, , ALT 51, lactate 3.2. Of note, WNL: Ammonia 46 and Hgb 13.8. UA revealed many bacteria, +WBC, +LE, -nitrite. Given CTX 1g x1, Zosyn x1, lactulose 10g x1, and 1L NS bolus. Admitted to Medicine for further management. Methotrexate Pregnancy And Lactation Text: This medication is Pregnancy Category X and is known to cause fetal harm. This medication is excreted in breast milk.

## 2021-02-06 NOTE — ED PROVIDER NOTE - ATTENDING CONTRIBUTION TO CARE
DR. SHANNON, ATTENDING MD-  I performed a face to face bedside interview with the patient regarding history of present illness, review of symptoms and past medical history. I completed an independent physical exam.  I have discussed the patient's plan of care with the resident.   Documentation as above in the note.    79 y/o male h/o cirrhosis, esoph bld here with myalgias, abd pain, nausea.  Viral syndrome vs diverticulitis vs colitis vs sbp.  Obtain cbc cmp ct a/p ua ucx covid swab give abx will need admission for further care and evaluation.

## 2021-02-07 DIAGNOSIS — E80.6 OTHER DISORDERS OF BILIRUBIN METABOLISM: ICD-10-CM

## 2021-02-07 DIAGNOSIS — Z02.9 ENCOUNTER FOR ADMINISTRATIVE EXAMINATIONS, UNSPECIFIED: ICD-10-CM

## 2021-02-07 DIAGNOSIS — R10.9 UNSPECIFIED ABDOMINAL PAIN: ICD-10-CM

## 2021-02-07 DIAGNOSIS — I10 ESSENTIAL (PRIMARY) HYPERTENSION: ICD-10-CM

## 2021-02-07 DIAGNOSIS — K74.60 UNSPECIFIED CIRRHOSIS OF LIVER: ICD-10-CM

## 2021-02-07 DIAGNOSIS — N39.0 URINARY TRACT INFECTION, SITE NOT SPECIFIED: ICD-10-CM

## 2021-02-07 DIAGNOSIS — F03.90 UNSPECIFIED DEMENTIA, UNSPECIFIED SEVERITY, WITHOUT BEHAVIORAL DISTURBANCE, PSYCHOTIC DISTURBANCE, MOOD DISTURBANCE, AND ANXIETY: ICD-10-CM

## 2021-02-07 DIAGNOSIS — R06.02 SHORTNESS OF BREATH: ICD-10-CM

## 2021-02-07 DIAGNOSIS — R19.5 OTHER FECAL ABNORMALITIES: ICD-10-CM

## 2021-02-07 DIAGNOSIS — Z29.9 ENCOUNTER FOR PROPHYLACTIC MEASURES, UNSPECIFIED: ICD-10-CM

## 2021-02-07 LAB
-  CARBAPENEM RESISTANCE: SIGNIFICANT CHANGE UP
ALBUMIN SERPL ELPH-MCNC: 3.2 G/DL — LOW (ref 3.3–5)
ALP SERPL-CCNC: 454 U/L — HIGH (ref 40–120)
ALT FLD-CCNC: 44 U/L — HIGH (ref 4–41)
ANION GAP SERPL CALC-SCNC: 10 MMOL/L — SIGNIFICANT CHANGE UP (ref 7–14)
ANION GAP SERPL CALC-SCNC: 15 MMOL/L — HIGH (ref 7–14)
AST SERPL-CCNC: 89 U/L — HIGH (ref 4–40)
BASOPHILS # BLD AUTO: 0 K/UL — SIGNIFICANT CHANGE UP (ref 0–0.2)
BASOPHILS NFR BLD AUTO: 0 % — SIGNIFICANT CHANGE UP (ref 0–2)
BILIRUB SERPL-MCNC: 5.3 MG/DL — HIGH (ref 0.2–1.2)
BUN SERPL-MCNC: 23 MG/DL — SIGNIFICANT CHANGE UP (ref 7–23)
BUN SERPL-MCNC: 24 MG/DL — HIGH (ref 7–23)
CALCIUM SERPL-MCNC: 9.2 MG/DL — SIGNIFICANT CHANGE UP (ref 8.4–10.5)
CALCIUM SERPL-MCNC: 9.3 MG/DL — SIGNIFICANT CHANGE UP (ref 8.4–10.5)
CHLORIDE SERPL-SCNC: 100 MMOL/L — SIGNIFICANT CHANGE UP (ref 98–107)
CHLORIDE SERPL-SCNC: 99 MMOL/L — SIGNIFICANT CHANGE UP (ref 98–107)
CO2 SERPL-SCNC: 23 MMOL/L — SIGNIFICANT CHANGE UP (ref 22–31)
CO2 SERPL-SCNC: 26 MMOL/L — SIGNIFICANT CHANGE UP (ref 22–31)
CREAT SERPL-MCNC: 1.29 MG/DL — SIGNIFICANT CHANGE UP (ref 0.5–1.3)
CREAT SERPL-MCNC: 1.38 MG/DL — HIGH (ref 0.5–1.3)
E COLI DNA BLD POS QL NAA+NON-PROBE: SIGNIFICANT CHANGE UP
EOSINOPHIL # BLD AUTO: 0 K/UL — SIGNIFICANT CHANGE UP (ref 0–0.5)
EOSINOPHIL NFR BLD AUTO: 0 % — SIGNIFICANT CHANGE UP (ref 0–6)
GLUCOSE SERPL-MCNC: 116 MG/DL — HIGH (ref 70–99)
GLUCOSE SERPL-MCNC: 131 MG/DL — HIGH (ref 70–99)
GRAM STN FLD: SIGNIFICANT CHANGE UP
HCT VFR BLD CALC: 42.3 % — SIGNIFICANT CHANGE UP (ref 39–50)
HGB BLD-MCNC: 13.4 G/DL — SIGNIFICANT CHANGE UP (ref 13–17)
IANC: 11.93 K/UL — HIGH (ref 1.5–8.5)
LYMPHOCYTES # BLD AUTO: 0.72 K/UL — LOW (ref 1–3.3)
LYMPHOCYTES # BLD AUTO: 5.3 % — LOW (ref 13–44)
MAGNESIUM SERPL-MCNC: 1.4 MG/DL — LOW (ref 1.6–2.6)
MAGNESIUM SERPL-MCNC: 2 MG/DL — SIGNIFICANT CHANGE UP (ref 1.6–2.6)
MCHC RBC-ENTMCNC: 22.9 PG — LOW (ref 27–34)
MCHC RBC-ENTMCNC: 31.7 GM/DL — LOW (ref 32–36)
MCV RBC AUTO: 72.2 FL — LOW (ref 80–100)
METHOD TYPE: SIGNIFICANT CHANGE UP
MONOCYTES # BLD AUTO: 0.25 K/UL — SIGNIFICANT CHANGE UP (ref 0–0.9)
MONOCYTES NFR BLD AUTO: 1.8 % — LOW (ref 2–14)
NEUTROPHILS # BLD AUTO: 12.3 K/UL — HIGH (ref 1.8–7.4)
NEUTROPHILS NFR BLD AUTO: 85 % — HIGH (ref 43–77)
PHOSPHATE SERPL-MCNC: 1.6 MG/DL — LOW (ref 2.5–4.5)
PHOSPHATE SERPL-MCNC: 2.3 MG/DL — LOW (ref 2.5–4.5)
PLATELET # BLD AUTO: 149 K/UL — LOW (ref 150–400)
POTASSIUM SERPL-MCNC: 3 MMOL/L — LOW (ref 3.5–5.3)
POTASSIUM SERPL-MCNC: 4 MMOL/L — SIGNIFICANT CHANGE UP (ref 3.5–5.3)
POTASSIUM SERPL-SCNC: 3 MMOL/L — LOW (ref 3.5–5.3)
POTASSIUM SERPL-SCNC: 4 MMOL/L — SIGNIFICANT CHANGE UP (ref 3.5–5.3)
PROT SERPL-MCNC: 7.3 G/DL — SIGNIFICANT CHANGE UP (ref 6–8.3)
RBC # BLD: 5.86 M/UL — HIGH (ref 4.2–5.8)
RBC # FLD: 21.2 % — HIGH (ref 10.3–14.5)
SODIUM SERPL-SCNC: 135 MMOL/L — SIGNIFICANT CHANGE UP (ref 135–145)
SODIUM SERPL-SCNC: 138 MMOL/L — SIGNIFICANT CHANGE UP (ref 135–145)
SPECIMEN SOURCE: SIGNIFICANT CHANGE UP
SPECIMEN SOURCE: SIGNIFICANT CHANGE UP
WBC # BLD: 13.62 K/UL — HIGH (ref 3.8–10.5)
WBC # FLD AUTO: 13.62 K/UL — HIGH (ref 3.8–10.5)

## 2021-02-07 PROCEDURE — 99223 1ST HOSP IP/OBS HIGH 75: CPT | Mod: GC

## 2021-02-07 PROCEDURE — 99223 1ST HOSP IP/OBS HIGH 75: CPT

## 2021-02-07 PROCEDURE — 99232 SBSQ HOSP IP/OBS MODERATE 35: CPT

## 2021-02-07 PROCEDURE — 71045 X-RAY EXAM CHEST 1 VIEW: CPT | Mod: 26,76

## 2021-02-07 RX ORDER — ENOXAPARIN SODIUM 100 MG/ML
30 INJECTION SUBCUTANEOUS DAILY
Refills: 0 | Status: DISCONTINUED | OUTPATIENT
Start: 2021-02-07 | End: 2021-02-07

## 2021-02-07 RX ORDER — POTASSIUM PHOSPHATE, MONOBASIC POTASSIUM PHOSPHATE, DIBASIC 236; 224 MG/ML; MG/ML
15 INJECTION, SOLUTION INTRAVENOUS ONCE
Refills: 0 | Status: DISCONTINUED | OUTPATIENT
Start: 2021-02-07 | End: 2021-02-07

## 2021-02-07 RX ORDER — SODIUM,POTASSIUM PHOSPHATES 278-250MG
1 POWDER IN PACKET (EA) ORAL ONCE
Refills: 0 | Status: COMPLETED | OUTPATIENT
Start: 2021-02-07 | End: 2021-02-07

## 2021-02-07 RX ORDER — POTASSIUM CHLORIDE 20 MEQ
40 PACKET (EA) ORAL ONCE
Refills: 0 | Status: COMPLETED | OUTPATIENT
Start: 2021-02-07 | End: 2021-02-07

## 2021-02-07 RX ORDER — ENOXAPARIN SODIUM 100 MG/ML
40 INJECTION SUBCUTANEOUS DAILY
Refills: 0 | Status: DISCONTINUED | OUTPATIENT
Start: 2021-02-07 | End: 2021-02-09

## 2021-02-07 RX ORDER — SODIUM,POTASSIUM PHOSPHATES 278-250MG
2 POWDER IN PACKET (EA) ORAL ONCE
Refills: 0 | Status: COMPLETED | OUTPATIENT
Start: 2021-02-07 | End: 2021-02-07

## 2021-02-07 RX ORDER — POTASSIUM CHLORIDE 20 MEQ
20 PACKET (EA) ORAL
Refills: 0 | Status: DISCONTINUED | OUTPATIENT
Start: 2021-02-07 | End: 2021-02-07

## 2021-02-07 RX ORDER — MAGNESIUM SULFATE 500 MG/ML
2 VIAL (ML) INJECTION ONCE
Refills: 0 | Status: DISCONTINUED | OUTPATIENT
Start: 2021-02-07 | End: 2021-02-07

## 2021-02-07 RX ORDER — INFLUENZA VIRUS VACCINE 15; 15; 15; 15 UG/.5ML; UG/.5ML; UG/.5ML; UG/.5ML
0.5 SUSPENSION INTRAMUSCULAR ONCE
Refills: 0 | Status: DISCONTINUED | OUTPATIENT
Start: 2021-02-07 | End: 2021-02-18

## 2021-02-07 RX ORDER — MAGNESIUM SULFATE 500 MG/ML
2 VIAL (ML) INJECTION ONCE
Refills: 0 | Status: COMPLETED | OUTPATIENT
Start: 2021-02-07 | End: 2021-02-07

## 2021-02-07 RX ADMIN — Medication 40 MILLIEQUIVALENT(S): at 14:39

## 2021-02-07 RX ADMIN — Medication 1 TABLET(S): at 12:33

## 2021-02-07 RX ADMIN — Medication 325 MILLIGRAM(S): at 12:33

## 2021-02-07 RX ADMIN — Medication 1 PACKET(S): at 07:36

## 2021-02-07 RX ADMIN — PANTOPRAZOLE SODIUM 40 MILLIGRAM(S): 20 TABLET, DELAYED RELEASE ORAL at 05:00

## 2021-02-07 RX ADMIN — LACTULOSE 30 GRAM(S): 10 SOLUTION ORAL at 19:00

## 2021-02-07 RX ADMIN — Medication 500 MILLIGRAM(S): at 12:33

## 2021-02-07 RX ADMIN — Medication 50 GRAM(S): at 07:36

## 2021-02-07 RX ADMIN — PIPERACILLIN AND TAZOBACTAM 25 GRAM(S): 4; .5 INJECTION, POWDER, LYOPHILIZED, FOR SOLUTION INTRAVENOUS at 05:00

## 2021-02-07 RX ADMIN — LACTULOSE 30 GRAM(S): 10 SOLUTION ORAL at 05:00

## 2021-02-07 RX ADMIN — GABAPENTIN 100 MILLIGRAM(S): 400 CAPSULE ORAL at 14:39

## 2021-02-07 RX ADMIN — GABAPENTIN 100 MILLIGRAM(S): 400 CAPSULE ORAL at 05:00

## 2021-02-07 RX ADMIN — Medication 20 MILLIGRAM(S): at 05:00

## 2021-02-07 RX ADMIN — Medication 2 PACKET(S): at 22:50

## 2021-02-07 RX ADMIN — Medication 40 MILLIEQUIVALENT(S): at 07:36

## 2021-02-07 RX ADMIN — Medication 1 TABLET(S): at 12:34

## 2021-02-07 RX ADMIN — PIPERACILLIN AND TAZOBACTAM 25 GRAM(S): 4; .5 INJECTION, POWDER, LYOPHILIZED, FOR SOLUTION INTRAVENOUS at 14:39

## 2021-02-07 RX ADMIN — GABAPENTIN 100 MILLIGRAM(S): 400 CAPSULE ORAL at 22:50

## 2021-02-07 RX ADMIN — ENOXAPARIN SODIUM 40 MILLIGRAM(S): 100 INJECTION SUBCUTANEOUS at 12:33

## 2021-02-07 NOTE — PROGRESS NOTE ADULT - ASSESSMENT
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 Found to have UTI and possible cholangitis.

## 2021-02-07 NOTE — CONSULT NOTE ADULT - SUBJECTIVE AND OBJECTIVE BOX
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 pylori infection s/p triple therapy (10/2019), and recent admission from -20 for a R basicervical femoral neck fracture s/p multiple R hip procedures (Aug-Oct 2020) c/b post-op SVT that self resolved. GI now consulted c/f biliary obstruction in setting of abnormal liver enzymes.     Currently pt reports his only issue is right sided rib/shoulder pain. Pt currently denies abdominal pain, previously reporting diffuse abd pain with wife per chart review saying there may have been some episodes of ??n/v as well as ?? black stools. Pt denies melena, hematochezia, f/c, sob, chest pain, urinary symptoms.       In ED, T max 100.1, HD stable w/ SBPs 130-150s, HR 88-90s  Labs significant for:   WBC 13.5  Hb 13s   INR 1.63  Tbili 4.7 <--0.4   Alk P 516 <-- 211   <-- 23  ALT 51 <-- 13    UA (+) bacteria, WBCs, LE  BCx: (+) GNR       Allergies:  No Known Allergies      Home Medications:    Hospital Medications:  acetaminophen   Tablet .. 650 milliGRAM(s) Oral every 8 hours PRN  ascorbic acid 500 milliGRAM(s) Oral daily  calcium carbonate 1250 mG  + Vitamin D (OsCal 500 + D) 1 Tablet(s) Oral daily  ceftazidime/avibactam IVPB 2.5 Gram(s) IV Intermittent every 8 hours  enoxaparin Injectable 40 milliGRAM(s) SubCutaneous daily  furosemide    Tablet 20 milliGRAM(s) Oral daily  gabapentin 100 milliGRAM(s) Oral three times a day  influenza   Vaccine 0.5 milliLiter(s) IntraMuscular once  lactulose Syrup 30 Gram(s) Oral two times a day  multivitamin 1 Tablet(s) Oral daily  pantoprazole    Tablet 40 milliGRAM(s) Oral before breakfast  propranolol 10 milliGRAM(s) Oral two times a day      PMHX/PSHX:  Melena    Hematochezia    Arteriovenous malformation (AVM)    Pancreatitis    Porcelain gallbladder    Liver cirrhosis    Guillain-Westgate syndrome    HTN (hypertension)    History of repair of hip fracture    H/O inguinal hernia repair        Family history:  Family history of ovarian cancer (Sibling)        Denies family history of colon cancer/polyps, stomach cancer/polyps, pancreatic cancer/masses, liver cancer/disease, ovarian cancer and endometrial cancer.    Social History:     Tob: Denies  EtOH: Denies  Illicit Drugs: Denies    ROS:     General:  No wt loss, fevers, chills, night sweats, fatigue  Eyes:  Good vision, no reported pain  ENT:  No sore throat, pain, runny nose, dysphagia  CV:  No pain, palpitations, hypo/hypertension  Pulm:  No dyspnea, cough, tachypnea, wheezing  GI:  No pain, No nausea, No vomiting, No diarrhea, No constipation, No weight loss, No fever, No pruritis, No rectal bleeding, No tarry stools, No dysphagia,  :  No pain, bleeding, incontinence, nocturia  Muscle:  No pain, weakness  Neuro:  No weakness, tingling, memory problems  Psych:  No fatigue, insomnia, mood problems, depression  Endocrine:  No polyuria, polydipsia, cold/heat intolerance  Heme:  No petechiae, ecchymosis, easy bruisability  Skin:  No rash, tattoos, scars, edema    PHYSICAL EXAM:     GENERAL:  No acute distress, elderly man,   HEENT:  Normocephalic/atraumatic, + scleral icterus  CHEST: no accessory muscle use  HEART:  Regular rate and rhythm, no murmurs/rubs/gallops  ABDOMEN:  Soft, +TTP RUQ, non-distended, normoactive bowel sounds,  no masses  EXTREMITIES: No cyanosis, clubbing, or edema  SKIN:  +spider angiomata on left cheek, +jaundice  NEURO:  Alert and oriented x 2, no asterixis    Vital Signs:  Vital Signs Last 24 Hrs  T(C): 36.3 (2021 15:40), Max: 37.8 (2021 20:04)  T(F): 97.4 (2021 15:40), Max: 100.1 (2021 20:04)  HR: 62 (2021 15:40) (62 - 91)  BP: 124/57 (2021 15:40) (111/61 - 158/84)  BP(mean): --  RR: 18 (2021 15:40) (16 - 18)  SpO2: 97% (2021 15:40) (97% - 100%)  Daily     Daily     LABS:                        13.4   13.62 )-----------( 149      ( 2021 06:31 )             42.3     Mean Cell Volume: 72.2 fL (- @ 06:31)    02-    138  |  100  |  23  ----------------------------<  131<H>  3.0<L>   |  23  |  1.29    Ca    9.3      2021 06:31  Phos  2.3     -  Mg     1.4         TPro  7.3  /  Alb  3.2<L>  /  TBili  5.3<H>  /  DBili  x   /  AST  89<H>  /  ALT  44<H>  /  AlkPhos  454<H>      LIVER FUNCTIONS - ( 2021 06:31 )  Alb: 3.2 g/dL / Pro: 7.3 g/dL / ALK PHOS: 454 U/L / ALT: 44 U/L / AST: 89 U/L / GGT: x           PT/INR - ( 2021 17:16 )   PT: 18.2 sec;   INR: 1.63 ratio         PTT - ( 2021 17:16 )  PTT:33.6 sec  Urinalysis Basic - ( 2021 19:42 )    Color: Clarice / Appearance: Slightly Turbid / S.030 / pH: x  Gluc: x / Ketone: Negative  / Bili: Small / Urobili: 3 mg/dL   Blood: x / Protein: 30 mg/dL / Nitrite: Negative   Leuk Esterase: Large / RBC: 2 /HPF /  /HPF   Sq Epi: x / Non Sq Epi: 1 /HPF / Bacteria: Many      Amylase Serum--      Lipase serum--       [id number]                          13.4   13.62 )-----------( 149      ( 2021 06:31 )             42.3                         13.8   13.49 )-----------( 153      ( 2021 17:16 )             44.3       Imaging:    < from: Upper Endoscopy (20 @ 08:00) >  Findings:       EGD:       -The esophagus contained trace varices.       -The stomach was cleaned extensively with simethicone. No lesions seen.        The incisura was nodular. This was biopsied for H. pylori.       -The duodenal bulb was normal. D2 contained duodenitis.       -D2 contained a PD stent that was removed with a snare.                                Impression:          EGD:                       -The esophagus contained trace varices.                       -The stomach was cleaned extensively with simethicone.                        No lesions seen. The incisura was nodular. This was                        biopsied for H. pylori.                       -The duodenal bulb was normal. D2 contained duodenitis.                       - PD stent that was removed.  Recommendation:      - Discharge patient to home (ambulatory).                       - Follow up biopsies.                       - Prep for colonoscopy tomorrow.                                                                                   Attending Participation:       I personally performed the entire procedure.    < end of copied text >  < from: Colonoscopy (20 @ 07:59) >  Findings:       Colonoscopy:       -The perianal and digital rectal examinations were normal.       -Non-bleeding rectal varices were seen.       -There was a vascular ectasia in the descending colon. APC was applied        ablating it.       -There was a vascular ectasia in the ascending colon. APC was applied        ablating it.                                                                                   Impression:          Colonoscopy:                       -The perianal and digital rectal examinations were                        normal.                       -Non-bleeding rectal varices were seen.                       -There was a vascular ectasia in the descending colon.                        APC was applied ablating it.                  -There was a vascular ectasia in the ascending colon.                        APC was applied ablating it.  Recommendation:      - [facility]    < end of copied text >  < from: ERCP (19 @ 10:57) >  Findings:       EGD:       -The esophagus was normal.    -The stomach was normal.       -The duodenal bulb and sweep contained multiple deep clean based ulcers.       ERCP:       -The  film was normal.       -The duodenoscope was passed to the ampulla.       -The ampulla was normal appearing.       -The PD was cannulated while trying to cannulate the bile duct. The PD        was not injected.       -Double wire technique failed to cannulate the bile duct and thus a        5Fr-4 cm single pigtail stent was placed in the PD.       -Biliary cannulation was successful after PD stent placement using a Rx        39 preloaded sphincterotome.       -Contrast was injected into the bile duct. I acquired and interpreted        the fluoroscopic images. Images downloaded to PACS. There was a dilated     CBD with a round filling object.       -A biliary sphincterotomy was performed.       -A balloon catheter was used to remove the stone.       -An occlusion cholangiogram showed no more stones remained.       -There was excellent flow of bile and contrast from the bile duct.                                                                                   Impression:          EGD:                       -The esophagus was normal.                       -The stomach was normal.   -The duodenal bulb and sweep contained multiple deep                        clean based ulcers.                       ERCP:                       -The  film was normal.                       -The duodenoscope was passed to the ampulla.               -The ampulla was normal appearing.                       -The PD was cannulated while trying to cannulate the                        bile duct. The PD was not injected.                       -Double wire technique failed to cannulate thebile duct                        and thus a 5Fr-4 cm single pigtail stent was placed in                        the PD.                       -Biliary cannulation was successful after PD stent                        placement using a Rx 39 preloaded sphincterotome.                       -Contrast was injected into the bile duct. I acquired                        and interpreted the fluoroscopic images. Images                        downloaded to PACS. There was a dilated CBD with a round                filling object.                       -A biliary sphincterotomy was performed.                       -A balloon catheter was used to remove the stone.                       -An occlusion cholangiogram showed no more stones           remained.                       -There was excellent flow of bile and contrast from the                        bile duct.  Recommendation:      - [facility].                       - NPO     - IVF                       - Check serum or stool H. pylori. If positive please                        treat.                       - AXR in 4 weeks to see if the PD stent is still present.      < from: CT Abdomen and Pelvis w/ IV Cont (21 @ 18:44) >    EXAM:  CT ABDOMEN AND PELVIS IC        PROCEDURE DATE:  2021         INTERPRETATION:  CLINICAL INFORMATION: Acute abdominal pain.    COMPARISON: CT abdomen/pelvis 2020.    PROCEDURE:  CT of the Abdomen and Pelvis was performed with intravenous contrast.  Intravenous contrast: 90 ml Omnipaque 350. 10 ml discarded.  Oral contrast: None.  Sagittal and coronal reformats were performed.    FINDINGS:  LOWER CHEST: Aortic valve and coronary artery calcifications..    LIVER: Cirrhosis.  BILEDUCTS: Mild intrahepatic biliary ductal dilation, new since 2020.  GALLBLADDER: Distended gallbladder and cholelithiasis with irregular wall thickening.  SPLEEN: Within normal limits.  PANCREAS: Small focus of air within the main pancreatic duct in region of prior stent  ADRENALS: Within normal limits.  KIDNEYS/URETERS: Within normal limits.    BLADDER: Within normal limits.  REPRODUCTIVE ORGANS: The prostate is normal in size    BOWEL: No bowel obstruction. Appendix is normal.  PERITONEUM: No ascites.  VESSELS: Within normal limits.  RETROPERITONEUM/LYMPH NODES: No lymphadenopathy.  ABDOMINAL WALL: Unchanged 2.3 x 4 cm mass in the right rectus sheath and right inguinal hernia repair plug.  BONES: Degenerative changes. Right hip arthroplasty.    IMPRESSION:    Mild intrahepatic biliary ductal dilation and irregular gallbladder wall, new since 2020 with interval removal of pancreatic duct stent. Correlation with HIDA scan can be performed for further evaluation.      < end of copied text >

## 2021-02-07 NOTE — PROGRESS NOTE ADULT - SUBJECTIVE AND OBJECTIVE BOX
PROGRESS NOTE:   Authored by Gorge Middleton MD  PGY-1, Internal Medicine  Pager [facility] [phone], [phone] [id number]     Patient is a 80y old  Male who presents with a chief complaint of abdominal pain, emesis, dark stools (2021 22:51)      SUBJECTIVE / OVERNIGHT EVENTS: No events overnight.    ADDITIONAL REVIEW OF SYSTEMS: Denies fevers, chills, n/v.    MEDICATIONS  (STANDING):  ascorbic acid 500 milliGRAM(s) Oral daily  calcium carbonate 1250 mG  + Vitamin D (OsCal 500 + D) 1 Tablet(s) Oral daily  enoxaparin Injectable 40 milliGRAM(s) SubCutaneous daily  ferrous    sulfate 325 milliGRAM(s) Oral daily  furosemide    Tablet 20 milliGRAM(s) Oral daily  gabapentin 100 milliGRAM(s) Oral three times a day  lactulose Syrup 30 Gram(s) Oral two times a day  multivitamin 1 Tablet(s) Oral daily  pantoprazole    Tablet 40 milliGRAM(s) Oral before breakfast  piperacillin/tazobactam IVPB.. 3.375 Gram(s) IV Intermittent every 8 hours  propranolol 10 milliGRAM(s) Oral two times a day    MEDICATIONS  (PRN):  acetaminophen   Tablet .. 650 milliGRAM(s) Oral every 8 hours PRN Temp greater or equal to 38C (100.4F), Mild Pain (1 - 3), Moderate Pain (4 - 6)      CAPILLARY BLOOD GLUCOSE        I&O's Summary      PHYSICAL EXAM:  Vital Signs Last 24 Hrs  T(C): 36.8 (2021 04:51), Max: 37.8 (2021 20:04)  T(F): 98.2 (2021 04:51), Max: 100.1 (2021 20:04)  HR: 91 (2021 04:51) (82 - 91)  BP: 155/72 (2021 04:51) (137/67 - 158/84)  BP(mean): --  RR: 18 (2021 04:51) (16 - 18)  SpO2: 98% (2021 04:51) (98% - 100%)    CONSTITUTIONAL: NAD, lying in bed comfortably  RESPIRATORY: Normal respiratory effort; CTABL  CARDIOVASCULAR: Regular rate and rhythm, normal S1 and S2, no murmur/rub/gallop  ABDOMEN: Soft, nondistended, nontender to palpation, normoactive bowel sounds, no rebound/guarding  MUSCLOSKELETAL: no joint swelling or tenderness to palpation, FROM all extremities  NEURO: AAOx3 to person, place, and time, full and equal strength all extremities   EXTREMITIES: no pedal edema    LABS:                        13.4   13.62 )-----------( 149      ( 2021 06:31 )             42.3     02-07    138  |  100  |  23  ----------------------------<  131<H>  3.0<L>   |  23  |  1.29    Ca    9.3      2021 06:31  Phos  2.3     -  Mg     1.4     -    TPro  7.3  /  Alb  3.2<L>  /  TBili  5.3<H>  /  DBili  x   /  AST  89<H>  /  ALT  44<H>  /  AlkPhos  454<H>  -    PT/INR - ( 2021 17:16 )   PT: 18.2 sec;   INR: 1.63 ratio         PTT - ( 2021 17:16 )  PTT:33.6 sec      Urinalysis Basic - ( 2021 19:42 )    Color: Clarice / Appearance: Slightly Turbid / S.030 / pH: x  Gluc: x / Ketone: Negative  / Bili: Small / Urobili: 3 mg/dL   Blood: x / Protein: 30 mg/dL / Nitrite: Negative   Leuk Esterase: Large / RBC: 2 /HPF /  /HPF   Sq Epi: x / Non Sq Epi: 1 /HPF / Bacteria: Many          RADIOLOGY & ADDITIONAL TESTS:     PROGRESS NOTE:   Authored by Gorge Middleton MD  PGY-1, Internal Medicine  Pager [facility] [phone], [facility] [id number]     Patient is a 80y old  Male who presents with a chief complaint of abdominal pain, emesis, dark stools (2021 22:51)      SUBJECTIVE / OVERNIGHT EVENTS: Patient seen this AM. Reports improvement in abdominal pain but still has some diffuse pain on palpation. Also endorsing pain in R hip and pain when palpating ribs.     ADDITIONAL REVIEW OF SYSTEMS: Denies fevers, chills, n/v.    MEDICATIONS  (STANDING):  ascorbic acid 500 milliGRAM(s) Oral daily  calcium carbonate 1250 mG  + Vitamin D (OsCal 500 + D) 1 Tablet(s) Oral daily  enoxaparin Injectable 40 milliGRAM(s) SubCutaneous daily  ferrous    sulfate 325 milliGRAM(s) Oral daily  furosemide    Tablet 20 milliGRAM(s) Oral daily  gabapentin 100 milliGRAM(s) Oral three times a day  lactulose Syrup 30 Gram(s) Oral two times a day  multivitamin 1 Tablet(s) Oral daily  pantoprazole    Tablet 40 milliGRAM(s) Oral before breakfast  piperacillin/tazobactam IVPB.. 3.375 Gram(s) IV Intermittent every 8 hours  propranolol 10 milliGRAM(s) Oral two times a day    MEDICATIONS  (PRN):  acetaminophen   Tablet .. 650 milliGRAM(s) Oral every 8 hours PRN Temp greater or equal to 38C (100.4F), Mild Pain (1 - 3), Moderate Pain (4 - 6)      CAPILLARY BLOOD GLUCOSE        I&O's Summary      PHYSICAL EXAM:  Vital Signs Last 24 Hrs  T(C): 36.8 (2021 04:51), Max: 37.8 (2021 20:04)  T(F): 98.2 (2021 04:51), Max: 100.1 (2021 20:04)  HR: 91 (2021 04:51) (82 - 91)  BP: 155/72 (2021 04:51) (137/67 - 158/84)  BP(mean): --  RR: 18 (2021 04:51) (16 - 18)  SpO2: 98% (2021 04:51) (98% - 100%)    CONSTITUTIONAL: NAD, lying in bed comfortably  RESPIRATORY: Normal respiratory effort; CTABL  CARDIOVASCULAR: Regular rate and rhythm, normal S1 and S2, no murmur/rub/gallop  ABDOMEN: Soft, nondistended, nontender to palpation, normoactive bowel sounds, no rebound/guarding  MUSCLOSKELETAL: no joint swelling or tenderness to palpation, FROM all extremities  NEURO: AAOx3 to person, place, and time, full and equal strength all extremities   EXTREMITIES: no pedal edema    LABS:                        13.4   13.62 )-----------( 149      ( 2021 06:31 )             42.3     02-07    138  |  100  |  23  ----------------------------<  131<H>  3.0<L>   |  23  |  1.29    Ca    9.3      2021 06:31  Phos  2.3     02-07  Mg     1.4     02-07    TPro  7.3  /  Alb  3.2<L>  /  TBili  5.3<H>  /  DBili  x   /  AST  89<H>  /  ALT  44<H>  /  AlkPhos  454<H>  02-07    PT/INR - ( 2021 17:16 )   PT: 18.2 sec;   INR: 1.63 ratio         PTT - ( 2021 17:16 )  PTT:33.6 sec      Urinalysis Basic - ( 2021 19:42 )    Color: Clarice / Appearance: Slightly Turbid / S.030 / pH: x  Gluc: x / Ketone: Negative  / Bili: Small / Urobili: 3 mg/dL   Blood: x / Protein: 30 mg/dL / Nitrite: Negative   Leuk Esterase: Large / RBC: 2 /HPF /  /HPF   Sq Epi: x / Non Sq Epi: 1 /HPF / Bacteria: Many          RADIOLOGY & ADDITIONAL TESTS:     PROGRESS NOTE:   Authored by Gorge Middleton MD  PGY-1, Internal Medicine  Pager [facility] [phone], [facility] [id number]     Patient is a 80y old  Male who presents with a chief complaint of abdominal pain, emesis, dark stools (2021 22:51)      SUBJECTIVE / OVERNIGHT EVENTS: Patient seen this AM. Reports improvement in abdominal pain but still has some diffuse pain on palpation. Also endorsing pain in R hip and pain when palpating ribs.     ADDITIONAL REVIEW OF SYSTEMS: Denies fevers, chills, n/v.    MEDICATIONS  (STANDING):  ascorbic acid 500 milliGRAM(s) Oral daily  calcium carbonate 1250 mG  + Vitamin D (OsCal 500 + D) 1 Tablet(s) Oral daily  enoxaparin Injectable 40 milliGRAM(s) SubCutaneous daily  ferrous    sulfate 325 milliGRAM(s) Oral daily  furosemide    Tablet 20 milliGRAM(s) Oral daily  gabapentin 100 milliGRAM(s) Oral three times a day  lactulose Syrup 30 Gram(s) Oral two times a day  multivitamin 1 Tablet(s) Oral daily  pantoprazole    Tablet 40 milliGRAM(s) Oral before breakfast  piperacillin/tazobactam IVPB.. 3.375 Gram(s) IV Intermittent every 8 hours  propranolol 10 milliGRAM(s) Oral two times a day    MEDICATIONS  (PRN):  acetaminophen   Tablet .. 650 milliGRAM(s) Oral every 8 hours PRN Temp greater or equal to 38C (100.4F), Mild Pain (1 - 3), Moderate Pain (4 - 6)      CAPILLARY BLOOD GLUCOSE        I&O's Summary      PHYSICAL EXAM:  Vital Signs Last 24 Hrs  T(C): 36.8 (2021 04:51), Max: 37.8 (2021 20:04)  T(F): 98.2 (2021 04:51), Max: 100.1 (2021 20:04)  HR: 91 (2021 04:51) (82 - 91)  BP: 155/72 (2021 04:51) (137/67 - 158/84)  BP(mean): --  RR: 18 (2021 04:51) (16 - 18)  SpO2: 98% (2021 04:51) (98% - 100%)    CONSTITUTIONAL: NAD, lying in bed comfortably  RESPIRATORY: Normal respiratory effort; CTABL  CARDIOVASCULAR: Regular rate and rhythm, normal S1 and S2, no murmur/rub/gallop  ABDOMEN: Soft, nondistended, nontender to palpation, normoactive bowel sounds, no rebound/guarding  MUSCLOSKELETAL: no joint swelling or tenderness to palpation, FROM all extremities  NEURO: AAOx2   EXTREMITIES: no pedal edema    LABS:                        13.4   13.62 )-----------( 149      ( 2021 06:31 )             42.3     02-07    138  |  100  |  23  ----------------------------<  131<H>  3.0<L>   |  23  |  1.29    Ca    9.3      2021 06:31  Phos  2.3     02-07  Mg     1.4     02-07    TPro  7.3  /  Alb  3.2<L>  /  TBili  5.3<H>  /  DBili  x   /  AST  89<H>  /  ALT  44<H>  /  AlkPhos  454<H>  02-07    PT/INR - ( 2021 17:16 )   PT: 18.2 sec;   INR: 1.63 ratio         PTT - ( 2021 17:16 )  PTT:33.6 sec      Urinalysis Basic - ( 2021 19:42 )    Color: Clarice / Appearance: Slightly Turbid / S.030 / pH: x  Gluc: x / Ketone: Negative  / Bili: Small / Urobili: 3 mg/dL   Blood: x / Protein: 30 mg/dL / Nitrite: Negative   Leuk Esterase: Large / RBC: 2 /HPF /  /HPF   Sq Epi: x / Non Sq Epi: 1 /HPF / Bacteria: Many          RADIOLOGY & ADDITIONAL TESTS:

## 2021-02-07 NOTE — PROGRESS NOTE ADULT - PROBLEM SELECTOR PLAN 2
Peace Alberto RN, CCC Pt presented with RUQ pain, leukocytosis, subjective fevers/chills, elevated T.bili 4.7, direct 2.3 and indirect 2.4, Alk phos 516, , ALT 51. Pt has hx of porcelain gallbladder (but 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 in the past).  - CT A/P w/ IV con (2/6/21): Mild intrahepatic biliary ductal dilation and irregular gallbladder wall, new since 4/1/2020 with interval removal of pancreatic duct stent.  - Concern for possible cholangitis  - f/u BCx  - c/w Zosyn (2/6/21- )  - MRCP ordered  - GI/Hepatology consulted by ED, f/u recs

## 2021-02-07 NOTE — CONSULT NOTE ADULT - SUBJECTIVE AND OBJECTIVE BOX
Patient is a 80y old  Male who presents with a chief complaint of abdominal pain, emesis, dark stools (2021 07:55)    HPI:  80 M PMHx of Cirrhosis (ETOH/WALL), dementia, CKD, R hip arthroplasty (10/2020), previous ESBL E coli UTIs presented with diffuse abdominal pain and vomiting.    In the ED, tmax 100.1, WBC 13.49, new transaminitis/hyperbilirubinemia since december with Bilirubin 4.7, UA with signficant pyuria. CT A/P showed mild intrahepatic biliary dilation with plan for MRCP for further follow up.    Pt was started on Zosyn. Bcx now returned positive, 3 of 4 with PCR showing MDRO E coli.    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   200 mL/Hr IV Intermittent (21 @ 21:43)    piperacillin/tazobactam IVPB..   25 mL/Hr IV Intermittent (21 @ 14:39)   25 mL/Hr IV Intermittent (21 @ 05:00)      ANTIMICROBIALS:    piperacillin/tazobactam IVPB.. 3.375 every 8 hours    OTHER MEDS: MEDICATIONS  (STANDING):  acetaminophen   Tablet .. 650 every 8 hours PRN  enoxaparin Injectable 40 daily  furosemide    Tablet 20 daily  gabapentin 100 three times a day  influenza   Vaccine 0.5 once  lactulose Syrup 30 two times a day  pantoprazole    Tablet 40 before breakfast  propranolol 10 two times a day    SOCIAL HISTORY:   hx smoking  non-smoker    FAMILY HISTORY:  Family history of ovarian cancer (Sibling)      REVIEW OF SYSTEMS  [  ] ROS unobtainable because:    [  ] All other systems negative except as noted below:	    Constitutional:  [ ] fever [ ] chills  [ ] weight loss  [ ] weakness  Skin:  [ ] rash [ ] phlebitis	  Eyes: [ ] icterus [ ] pain  [ ] discharge	  ENMT: [ ] sore throat  [ ] thrush [ ] ulcers [ ] exudates  Respiratory: [ ] dyspnea [ ] hemoptysis [ ] cough [ ] sputum	  Cardiovascular:  [ ] chest pain [ ] palpitations [ ] edema	  Gastrointestinal:  [ ] nausea [ ] vomiting [ ] diarrhea [ ] constipation [ ] pain	  Genitourinary:  [ ] dysuria [ ] frequency [ ] hematuria [ ] discharge [ ] flank pain  [ ] incontinence  Musculoskeletal:  [ ] myalgias [ ] arthralgias [ ] arthritis  [ ] back pain  Neurological:  [ ] headache [ ] seizures  [ ] confusion/altered mental status  Psychiatric:  [ ] anxiety [ ] depression	  Hematology/Lymphatics:  [ ] lymphadenopathy  Endocrine:  [ ] adrenal [ ] thyroid  Allergic/Immunologic:	 [ ] transplant [ ] seasonal    Vital Signs Last 24 Hrs  T(F): 98.2 (21 @ 09:43), Max: 100.1 (21 @ 20:04)  Vital Signs Last 24 Hrs  HR: 68 (21 @ 09:43) (68 - 91)  BP: 111/61 (21 @ 09:43) (111/61 - 158/84)  RR: 18 (21 @ 09:43)  SpO2: 100% (21 @ 09:43) (98% - 100%)  Wt(kg): --    PHYSICAL EXAM:  Constitutional: non-toxic, no distress  HEAD/EYES: anicteric, no conjunctival injection  ENT:  supple, no thrush  Cardiovascular:   normal S1, S2, no murmur, no edema  Respiratory:  clear BS bilaterally, no wheezes, no rales  GI:  soft, non-tender, normal bowel sounds  :  no rodriguez, no CVA tenderness  Musculoskeletal:  no synovitis, normal ROM  Neurologic: awake and alert, normal strength, no focal findings  Skin:  no rash, no erythema, no phlebitis  Heme/Onc: no lymphadenopathy   Psychiatric:  awake, alert, appropriate mood                            13.4   13.62 )-----------( 149      ( 2021 06:31 )             42.3     02-    138  |  100  |  23  ----------------------------<  131<H>  3.0<L>   |  23  |  1.29    Ca    9.3      2021 06:31  Phos  2.3     02-  Mg     1.4         TPro  7.3  /  Alb  3.2<L>  /  TBili  5.3<H>  /  DBili  x   /  AST  89<H>  /  ALT  44<H>  /  AlkPhos  454<H>      Urinalysis Basic - ( 2021 19:42 )    Color: Clarice / Appearance: Slightly Turbid / S.030 / pH: x  Gluc: x / Ketone: Negative  / Bili: Small / Urobili: 3 mg/dL   Blood: x / Protein: 30 mg/dL / Nitrite: Negative   Leuk Esterase: Large / RBC: 2 /HPF /  /HPF   Sq Epi: x / Non Sq Epi: 1 /HPF / Bacteria: Many    MICROBIOLOGY:  Culture - Blood (collected 2021 23:37)  Source: .Blood Blood  Gram Stain (2021 12:19):    Growth in aerobic bottle: Gram Negative Rods  Preliminary Report (2021 12:19):    Growth in aerobic bottle: Gram Negative Rods    Culture - Blood (collected 2021 23:28)  Source: .Blood Blood-Venous  Gram Stain (2021 14:18):    Growth in aerobic bottle: Gram Negative Rods    Growth in anaerobic bottle: Gram Negative Rods  Preliminary Report (2021 14:18):    Growth in aerobic bottle: Gram Negative Rods    Growth in anaerobic bottle: Gram Negative Rods    ***Blood Panel PCR results on this specimen are available    approximately 3 hours after the Gram stain result.***    Gram stain, PCR, and/or culture results may not always    correspond due to difference in methodologies.    ************************************************************    This PCR assay was performed by multiplex PCR. This    Assay tests for 66 bacterial and resistance gene targets.    Please refer to the [facility] [employer] test directory    at [email or web address] for details.  Organism: Blood Culture PCR (2021 14:29)  Organism: Blood Culture PCR (2021 14:29)      -  Escherichia coli: Detec      -  MDRO: Detec      Method Type: PCR                  RADIOLOGY:  imaging below personally reviewed     Patient is a 80y old  Male who presents with a chief complaint of abdominal pain, emesis, dark stools (2021 07:55)    HPI:  80 M PMHx of Cirrhosis (ETOH/WALL), dementia, CKD, R hip arthroplasty (10/2020), previous ESBL E coli UTIs presented with diffuse abdominal pain and vomiting.    Pt is oriented and conversant but unclear how reliable history is, he states he was not vomiting or having abdominal issues when H&P states that he was. He denies fever, chills, dysuria, flank pain. He denies pain in right hip replacement.    In the ED, tmax 100.1, WBC 13.49, new transaminitis/hyperbilirubinemia since december with Bilirubin 4.7, UA with signficant pyuria. CT A/P showed mild intrahepatic biliary dilation with plan for MRCP for further follow up.    Pt was started on Zosyn. Bcx now returned positive, 3 of 4 with PCR showing MDRO E coli.    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   200 mL/Hr IV Intermittent (21 @ 21:43)    piperacillin/tazobactam IVPB..   25 mL/Hr IV Intermittent (21 @ 14:39)   25 mL/Hr IV Intermittent (21 @ 05:00)      ANTIMICROBIALS:    piperacillin/tazobactam IVPB.. 3.375 every 8 hours    OTHER MEDS: MEDICATIONS  (STANDING):  acetaminophen   Tablet .. 650 every 8 hours PRN  enoxaparin Injectable 40 daily  furosemide    Tablet 20 daily  gabapentin 100 three times a day  influenza   Vaccine 0.5 once  lactulose Syrup 30 two times a day  pantoprazole    Tablet 40 before breakfast  propranolol 10 two times a day    SOCIAL HISTORY:  originally from [facility], significant previous alcohol use, no smoking history    FAMILY HISTORY:  Family history of ovarian cancer (Sibling)      REVIEW OF SYSTEMS  [  ] ROS unobtainable because:    [x  ] All other systems negative except as noted below:	    Constitutional:  [ ] fever [ ] chills  [ ] weight loss  [ ] weakness  Skin:  [ ] rash [ ] phlebitis	  Eyes: [ ] icterus [ ] pain  [ ] discharge	  ENMT: [ ] sore throat  [ ] thrush [ ] ulcers [ ] exudates  Respiratory: [ ] dyspnea [ ] hemoptysis [ ] cough [ ] sputum	  Cardiovascular:  [ ] chest pain [ ] palpitations [ ] edema	  Gastrointestinal:  [ ] nausea [ ] vomiting [ ] diarrhea [ ] constipation [ ] pain	  Genitourinary:  [ ] dysuria [ ] frequency [ ] hematuria [ ] discharge [ ] flank pain  [ ] incontinence  Musculoskeletal:  [ ] myalgias [ ] arthralgias [ ] arthritis  [ ] back pain  Neurological:  [ ] headache [ ] seizures  [ ] confusion/altered mental status  Psychiatric:  [ ] anxiety [ ] depression	  Hematology/Lymphatics:  [ ] lymphadenopathy  Endocrine:  [ ] adrenal [ ] thyroid  Allergic/Immunologic:	 [ ] transplant [ ] seasonal    Vital Signs Last 24 Hrs  T(F): 98.2 (21 @ 09:43), Max: 100.1 (21 @ 20:04)  Vital Signs Last 24 Hrs  HR: 68 (21 @ 09:43) (68 - 91)  BP: 111/61 (21 @ 09:43) (111/61 - 158/84)  RR: 18 (21 @ 09:43)  SpO2: 100% (21 @ 09:43) (98% - 100%)  Wt(kg): --    PHYSICAL EXAM:  Constitutional: non-toxic, no distress, jaundiced, oriented to person and place  HEAD/EYES: icteric, no conjunctival injection  ENT:  supple, no thrush  Cardiovascular:   normal S1, S2, no murmur, no edema  Respiratory:  clear BS bilaterally, no wheezes, no rales  GI:  soft, TTP, RUQ  :  no rodriguez, no CVA tenderness  Musculoskeletal:  no synovitis, normal ROM including right hip  Neurologic: awake and alert, normal strength, no focal findings  Skin:  no rash, no erythema, no phlebitis  Heme/Onc: no lymphadenopathy   Psychiatric:  awake, alert, appropriate mood, pleasant                            13.4   13.62 )-----------( 149      ( 2021 06:31 )             42.3     02-07    138  |  100  |  23  ----------------------------<  131<H>  3.0<L>   |  23  |  1.29    Ca    9.3      2021 06:31  Phos  2.3     02-07  Mg     1.4         TPro  7.3  /  Alb  3.2<L>  /  TBili  5.3<H>  /  DBili  x   /  AST  89<H>  /  ALT  44<H>  /  AlkPhos  454<H>  02-    Urinalysis Basic - ( 2021 19:42 )    Color: Clarice / Appearance: Slightly Turbid / S.030 / pH: x  Gluc: x / Ketone: Negative  / Bili: Small / Urobili: 3 mg/dL   Blood: x / Protein: 30 mg/dL / Nitrite: Negative   Leuk Esterase: Large / RBC: 2 /HPF /  /HPF   Sq Epi: x / Non Sq Epi: 1 /HPF / Bacteria: Many    MICROBIOLOGY:  Culture - Blood (collected 2021 23:37)  Source: .Blood Blood  Gram Stain (2021 12:19):    Growth in aerobic bottle: Gram Negative Rods  Preliminary Report (2021 12:19):    Growth in aerobic bottle: Gram Negative Rods    Culture - Blood (collected 2021 23:28)  Source: .Blood Blood-Venous  Gram Stain (2021 14:18):    Growth in aerobic bottle: Gram Negative Rods    Growth in anaerobic bottle: Gram Negative Rods  Preliminary Report (2021 14:18):    Growth in aerobic bottle: Gram Negative Rods    Growth in anaerobic bottle: Gram Negative Rods    ***Blood Panel PCR results on this specimen are available    approximately 3 hours after the Gram stain result.***    Gram stain, PCR, and/or culture results may not always    correspond due to difference in methodologies.    ************************************************************    This PCR assay was performed by multiplex PCR. This    Assay tests for 66 bacterial and resistance gene targets.    Please refer to the [facility] [employer] test directory    at [email or web address] for details.  Organism: Blood Culture PCR (2021 14:29)  Organism: Blood Culture PCR (2021 14:29)      -  Escherichia coli: Detec      -  MDRO: Detec      Method Type: PCR                  RADIOLOGY:  < from: CT Abdomen and Pelvis w/ IV Cont (21 @ 18:44) >  IMPRESSION:    Mild intrahepatic biliary ductal dilation and irregular gallbladder wall, new since 2020 with interval removal of pancreatic duct stent. Correlation with HIDA scan can be performed for further evaluation.

## 2021-02-07 NOTE — PROGRESS NOTE ADULT - PROBLEM SELECTOR PLAN 7
Hx of dementia; as per wife, AAOx2-3 with some confusion and memory issues at baseline.  - currently AAOx2, answering questions appropriately, no change in mental status as per wife  - if worsens, may be 2/2 infection vs hepatic encephalopathy (though ammonia wnl on admission)  - continue to monitor mental status

## 2021-02-07 NOTE — PROGRESS NOTE ADULT - PROBLEM SELECTOR PLAN 1
Pt presented with leukocytosis to WBC 13.49k, subjective fevers/chills/weakness, and +UA, likely 2/2 UTI  - s/p CTX 1g x1 and Zosyn x1 in ED  - c/w Zosyn (2/6/21- ); previous UCx in the past grew ESBL E.coli sensitive to Zosyn  - Tylenol PRN  - f/u UCx, BCx

## 2021-02-08 ENCOUNTER — APPOINTMENT (OUTPATIENT)
Dept: HEPATOLOGY | Facility: CLINIC | Age: 81
End: 2021-02-08

## 2021-02-08 ENCOUNTER — NON-APPOINTMENT (OUTPATIENT)
Age: 81
End: 2021-02-08

## 2021-02-08 DIAGNOSIS — R78.81 BACTEREMIA: ICD-10-CM

## 2021-02-08 LAB
ALBUMIN SERPL ELPH-MCNC: 2.7 G/DL — LOW (ref 3.3–5)
ALP SERPL-CCNC: 353 U/L — HIGH (ref 40–120)
ALT FLD-CCNC: 31 U/L — SIGNIFICANT CHANGE UP (ref 4–41)
ANION GAP SERPL CALC-SCNC: 14 MMOL/L — SIGNIFICANT CHANGE UP (ref 7–14)
AST SERPL-CCNC: 53 U/L — HIGH (ref 4–40)
BILIRUB SERPL-MCNC: 7.3 MG/DL — HIGH (ref 0.2–1.2)
BUN SERPL-MCNC: 21 MG/DL — SIGNIFICANT CHANGE UP (ref 7–23)
CALCIUM SERPL-MCNC: 8.8 MG/DL — SIGNIFICANT CHANGE UP (ref 8.4–10.5)
CHLORIDE SERPL-SCNC: 101 MMOL/L — SIGNIFICANT CHANGE UP (ref 98–107)
CO2 SERPL-SCNC: 23 MMOL/L — SIGNIFICANT CHANGE UP (ref 22–31)
CREAT SERPL-MCNC: 1.29 MG/DL — SIGNIFICANT CHANGE UP (ref 0.5–1.3)
GLUCOSE SERPL-MCNC: 83 MG/DL — SIGNIFICANT CHANGE UP (ref 70–99)
HCT VFR BLD CALC: 37.6 % — LOW (ref 39–50)
HGB BLD-MCNC: 12.2 G/DL — LOW (ref 13–17)
MCHC RBC-ENTMCNC: 23.5 PG — LOW (ref 27–34)
MCHC RBC-ENTMCNC: 32.4 GM/DL — SIGNIFICANT CHANGE UP (ref 32–36)
MCV RBC AUTO: 72.3 FL — LOW (ref 80–100)
NRBC # BLD: 0 /100 WBCS — SIGNIFICANT CHANGE UP
NRBC # FLD: 0 K/UL — SIGNIFICANT CHANGE UP
PLATELET # BLD AUTO: 126 K/UL — LOW (ref 150–400)
POTASSIUM SERPL-MCNC: 3.6 MMOL/L — SIGNIFICANT CHANGE UP (ref 3.5–5.3)
POTASSIUM SERPL-SCNC: 3.6 MMOL/L — SIGNIFICANT CHANGE UP (ref 3.5–5.3)
PROT SERPL-MCNC: 6.5 G/DL — SIGNIFICANT CHANGE UP (ref 6–8.3)
RBC # BLD: 5.2 M/UL — SIGNIFICANT CHANGE UP (ref 4.2–5.8)
RBC # FLD: 21.6 % — HIGH (ref 10.3–14.5)
SARS-COV-2 IGG SERPL QL IA: NEGATIVE — SIGNIFICANT CHANGE UP
SARS-COV-2 IGM SERPL IA-ACNC: 0.21 INDEX — SIGNIFICANT CHANGE UP
SODIUM SERPL-SCNC: 138 MMOL/L — SIGNIFICANT CHANGE UP (ref 135–145)
WBC # BLD: 10.81 K/UL — HIGH (ref 3.8–10.5)
WBC # FLD AUTO: 10.81 K/UL — HIGH (ref 3.8–10.5)

## 2021-02-08 PROCEDURE — 99233 SBSQ HOSP IP/OBS HIGH 50: CPT | Mod: GC

## 2021-02-08 PROCEDURE — 99232 SBSQ HOSP IP/OBS MODERATE 35: CPT

## 2021-02-08 PROCEDURE — 74183 MRI ABD W/O CNTR FLWD CNTR: CPT | Mod: 26

## 2021-02-08 RX ORDER — SENNA PLUS 8.6 MG/1
2 TABLET ORAL AT BEDTIME
Refills: 0 | Status: DISCONTINUED | OUTPATIENT
Start: 2021-02-08 | End: 2021-02-18

## 2021-02-08 RX ORDER — POTASSIUM PHOSPHATE, MONOBASIC POTASSIUM PHOSPHATE, DIBASIC 236; 224 MG/ML; MG/ML
15 INJECTION, SOLUTION INTRAVENOUS ONCE
Refills: 0 | Status: COMPLETED | OUTPATIENT
Start: 2021-02-08 | End: 2021-02-08

## 2021-02-08 RX ORDER — LIDOCAINE 4 G/100G
1 CREAM TOPICAL DAILY
Refills: 0 | Status: DISCONTINUED | OUTPATIENT
Start: 2021-02-08 | End: 2021-02-18

## 2021-02-08 RX ORDER — SODIUM CHLORIDE 9 MG/ML
1000 INJECTION INTRAMUSCULAR; INTRAVENOUS; SUBCUTANEOUS
Refills: 0 | Status: DISCONTINUED | OUTPATIENT
Start: 2021-02-08 | End: 2021-02-09

## 2021-02-08 RX ORDER — SODIUM CHLORIDE 9 MG/ML
1000 INJECTION INTRAMUSCULAR; INTRAVENOUS; SUBCUTANEOUS
Refills: 0 | Status: DISCONTINUED | OUTPATIENT
Start: 2021-02-08 | End: 2021-02-08

## 2021-02-08 RX ORDER — POLYETHYLENE GLYCOL 3350 17 G/17G
17 POWDER, FOR SOLUTION ORAL DAILY
Refills: 0 | Status: DISCONTINUED | OUTPATIENT
Start: 2021-02-08 | End: 2021-02-18

## 2021-02-08 RX ADMIN — Medication 500 MILLIGRAM(S): at 11:03

## 2021-02-08 RX ADMIN — LACTULOSE 30 GRAM(S): 10 SOLUTION ORAL at 06:25

## 2021-02-08 RX ADMIN — LIDOCAINE 1 PATCH: 4 CREAM TOPICAL at 11:09

## 2021-02-08 RX ADMIN — POLYETHYLENE GLYCOL 3350 17 GRAM(S): 17 POWDER, FOR SOLUTION ORAL at 11:01

## 2021-02-08 RX ADMIN — ENOXAPARIN SODIUM 40 MILLIGRAM(S): 100 INJECTION SUBCUTANEOUS at 11:03

## 2021-02-08 RX ADMIN — SODIUM CHLORIDE 75 MILLILITER(S): 9 INJECTION INTRAMUSCULAR; INTRAVENOUS; SUBCUTANEOUS at 11:02

## 2021-02-08 RX ADMIN — POTASSIUM PHOSPHATE, MONOBASIC POTASSIUM PHOSPHATE, DIBASIC 62.5 MILLIMOLE(S): 236; 224 INJECTION, SOLUTION INTRAVENOUS at 09:35

## 2021-02-08 RX ADMIN — SENNA PLUS 2 TABLET(S): 8.6 TABLET ORAL at 22:06

## 2021-02-08 RX ADMIN — GABAPENTIN 100 MILLIGRAM(S): 400 CAPSULE ORAL at 22:06

## 2021-02-08 RX ADMIN — LACTULOSE 20 GRAM(S): 10 SOLUTION ORAL at 18:41

## 2021-02-08 RX ADMIN — GABAPENTIN 100 MILLIGRAM(S): 400 CAPSULE ORAL at 06:25

## 2021-02-08 RX ADMIN — Medication 1 TABLET(S): at 11:03

## 2021-02-08 RX ADMIN — Medication 20 MILLIGRAM(S): at 06:25

## 2021-02-08 RX ADMIN — PANTOPRAZOLE SODIUM 40 MILLIGRAM(S): 20 TABLET, DELAYED RELEASE ORAL at 06:26

## 2021-02-08 RX ADMIN — GABAPENTIN 100 MILLIGRAM(S): 400 CAPSULE ORAL at 14:28

## 2021-02-08 RX ADMIN — SODIUM CHLORIDE 75 MILLILITER(S): 9 INJECTION INTRAMUSCULAR; INTRAVENOUS; SUBCUTANEOUS at 22:05

## 2021-02-08 NOTE — PHYSICAL THERAPY INITIAL EVALUATION ADULT - ADDITIONAL COMMENTS
Pt. owns a straight cane and rolling walker.     Pt. was left semisupine in bed post PT Evaluation, no apparent distress, all lines intact, call bell within reach. Lupe EDDY made aware of pt. status and participation in PT.

## 2021-02-08 NOTE — PROGRESS NOTE ADULT - SUBJECTIVE AND OBJECTIVE BOX
Chief Complaint:  Patient is a 80y old  Male who presents with a chief complaint of abdominal pain, emesis, dark stools (2021 07:30)    Interval Events:   No acute overnight events  No fevers, chills, chest pain, shortness of breath, nausea, vomiting, diarrhea     Allergies:  No Known Allergies    Hospital Medications:  acetaminophen   Tablet .. 650 milliGRAM(s) Oral every 8 hours PRN  ascorbic acid 500 milliGRAM(s) Oral daily  calcium carbonate 1250 mG  + Vitamin D (OsCal 500 + D) 1 Tablet(s) Oral daily  ceftazidime/avibactam IVPB 2.5 Gram(s) IV Intermittent every 8 hours  enoxaparin Injectable 40 milliGRAM(s) SubCutaneous daily  furosemide    Tablet 20 milliGRAM(s) Oral daily  gabapentin 100 milliGRAM(s) Oral three times a day  influenza   Vaccine 0.5 milliLiter(s) IntraMuscular once  lactulose Syrup 30 Gram(s) Oral two times a day  multivitamin 1 Tablet(s) Oral daily  pantoprazole    Tablet 40 milliGRAM(s) Oral before breakfast  potassium phosphate IVPB 15 milliMole(s) IV Intermittent once  propranolol 10 milliGRAM(s) Oral two times a day    PMHX/PSHX:  Melena    Hematochezia    Arteriovenous malformation (AVM)    Pancreatitis    Porcelain gallbladder    Liver cirrhosis    Guillain-Ola syndrome    HTN (hypertension)    History of repair of hip fracture    H/O inguinal hernia repair        ROS:   General:  No fevers, chills or night sweats  ENT:  No sore throat or dysphagia  CV:  No pain or palpitations  Resp:  No dyspnea, cough or  wheezing  GI:  No pain, No nausea, No vomiting, No diarrhea, No rectal bleeding, No tarry stools,  Skin:  No rash or edema    PHYSICAL EXAM:   Vital Signs:  Vital Signs Last 24 Hrs  T(C): 36.7 (2021 06:23), Max: 37.7 (2021 17:34)  T(F): 98.1 (2021 06:23), Max: 99.8 (2021 17:34)  HR: 63 (2021 06:23) (61 - 68)  BP: 125/59 (2021 06:23) (111/61 - 151/70)  BP(mean): --  RR: 18 (2021 06:23) (18 - 18)  SpO2: 97% (2021 06:23) (97% - 100%)  Daily     Daily     GENERAL:  NAD, Appears stated age  HEENT:  NC/AT,  conjunctivae clear and pink  CHEST:  Normal Effort, Normal rate  HEART:  RRR, S1 + S2  ABDOMEN:  Soft, non-tender, non-distended, BS+  EXTEREMITIES:  no cyanosis  SKIN:  Warm & Dry.  NEURO:  Alert, oriented    LABS:                        13.4   13.62 )-----------( 149      ( 2021 06:31 )             42.3       02-07    135  |  99  |  24<H>  ----------------------------<  116<H>  4.0   |  26  |  1.38<H>    Ca    9.2      2021 21:04  Phos  1.6     -  Mg     2.0     -    TPro  7.3  /  Alb  3.2<L>  /  TBili  5.3<H>  /  DBili  x   /  AST  89<H>  /  ALT  44<H>  /  AlkPhos  454<H>  02-    LIVER FUNCTIONS - ( 2021 06:31 )  Alb: 3.2 g/dL / Pro: 7.3 g/dL / ALK PHOS: 454 U/L / ALT: 44 U/L / AST: 89 U/L / GGT: x           PT/INR - ( 2021 17:16 )   PT: 18.2 sec;   INR: 1.63 ratio         PTT - ( 2021 17:16 )  PTT:33.6 sec  Urinalysis Basic - ( 2021 19:42 )    Color: Clarice / Appearance: Slightly Turbid / S.030 / pH: x  Gluc: x / Ketone: Negative  / Bili: Small / Urobili: 3 mg/dL   Blood: x / Protein: 30 mg/dL / Nitrite: Negative   Leuk Esterase: Large / RBC: 2 /HPF /  /HPF   Sq Epi: x / Non Sq Epi: 1 /HPF / Bacteria: Many                              13.4   13.62 )-----------( 149      ( 2021 06:31 )             42.3                         13.8   13.49 )-----------( 153      ( 2021 17:16 )             44.3       Imaging:

## 2021-02-08 NOTE — PROGRESS NOTE ADULT - PROBLEM SELECTOR PLAN 3
Pt presented with RUQ pain, leukocytosis, subjective fevers/chills, elevated T.bili 4.7, direct 2.3 and indirect 2.4, Alk phos 516, , ALT 51. Pt has hx of porcelain gallbladder (but 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 in the past).  - CT A/P w/ IV con (2/6/21): Mild intrahepatic biliary ductal dilation and irregular gallbladder wall, new since 4/1/2020 with interval removal of pancreatic duct stent.  - Concern for possible cholangitis  - f/u BCx  - c/w Zosyn (2/6/21- )  - MRCP ordered  - GI possibly plan for ERCP

## 2021-02-08 NOTE — PROGRESS NOTE ADULT - PROBLEM SELECTOR PLAN 2
Pt presented with leukocytosis to WBC 13.49k, subjective fevers/chills/weakness, and +UA, likely 2/2 UTI  - s/p CTX 1g x1 and Zosyn x1 in ED  - Zosyn (2/6/21- 2/7/21 ); previous UCx in the past grew ESBL E.coli sensitive to Zosyn  - Tylenol PRN  - On avycaz now

## 2021-02-08 NOTE — PROGRESS NOTE ADULT - PROBLEM SELECTOR PLAN 7
Appears to be stable. 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 At admission, pt does not appear overloaded on exam.  - c/w home lactulose 30g BID  - c/w home propanolol 10mg BID (for varices)  - c/w home furosemide 20mg daily  - GI/Hepatology consulted by ED, f/u recs

## 2021-02-08 NOTE — PROGRESS NOTE ADULT - SUBJECTIVE AND OBJECTIVE BOX
PROGRESS NOTE:   Authored by Gorge Middleton MD  PGY-1, Internal Medicine  Pager [facility] [phone], J [id number]     Patient is a 80y old  Male who presents with a chief complaint of abdominal pain, emesis, dark stools (2021 17:17)      SUBJECTIVE / OVERNIGHT EVENTS: No events overnight. Patient started on avycaz for MDRO E coli. bacteremia.     ADDITIONAL REVIEW OF SYSTEMS: Denies fevers, chills, n/v.    MEDICATIONS  (STANDING):  ascorbic acid 500 milliGRAM(s) Oral daily  calcium carbonate 1250 mG  + Vitamin D (OsCal 500 + D) 1 Tablet(s) Oral daily  ceftazidime/avibactam IVPB 2.5 Gram(s) IV Intermittent every 8 hours  enoxaparin Injectable 40 milliGRAM(s) SubCutaneous daily  furosemide    Tablet 20 milliGRAM(s) Oral daily  gabapentin 100 milliGRAM(s) Oral three times a day  influenza   Vaccine 0.5 milliLiter(s) IntraMuscular once  lactulose Syrup 30 Gram(s) Oral two times a day  multivitamin 1 Tablet(s) Oral daily  pantoprazole    Tablet 40 milliGRAM(s) Oral before breakfast  propranolol 10 milliGRAM(s) Oral two times a day    MEDICATIONS  (PRN):  acetaminophen   Tablet .. 650 milliGRAM(s) Oral every 8 hours PRN Temp greater or equal to 38C (100.4F), Mild Pain (1 - 3), Moderate Pain (4 - 6)      CAPILLARY BLOOD GLUCOSE        I&O's Summary      PHYSICAL EXAM:  Vital Signs Last 24 Hrs  T(C): 36.7 (2021 06:23), Max: 37.7 (2021 17:34)  T(F): 98.1 (2021 06:23), Max: 99.8 (2021 17:34)  HR: 63 (2021 06:23) (61 - 68)  BP: 125/59 (2021 06:23) (111/61 - 151/70)  BP(mean): --  RR: 18 (2021 06:23) (18 - 18)  SpO2: 97% (2021 06:23) (97% - 100%)    CONSTITUTIONAL: NAD, lying in bed comfortably  RESPIRATORY: Normal respiratory effort; CTABL  CARDIOVASCULAR: Regular rate and rhythm, normal S1 and S2, no murmur/rub/gallop  ABDOMEN: Soft, nondistended, nontender to palpation, normoactive bowel sounds, no rebound/guarding  MUSCLOSKELETAL: no joint swelling or tenderness to palpation, FROM all extremities  NEURO: AAOx3 to person, place, and time, full and equal strength all extremities   EXTREMITIES: no pedal edema    LABS:                        13.4   13.62 )-----------( 149      ( 2021 06:31 )             42.3     02-07    135  |  99  |  24<H>  ----------------------------<  116<H>  4.0   |  26  |  1.38<H>    Ca    9.2      2021 21:04  Phos  1.6     -  Mg     2.0     -07    TPro  7.3  /  Alb  3.2<L>  /  TBili  5.3<H>  /  DBili  x   /  AST  89<H>  /  ALT  44<H>  /  AlkPhos  454<H>  02-07    PT/INR - ( 2021 17:16 )   PT: 18.2 sec;   INR: 1.63 ratio         PTT - ( 2021 17:16 )  PTT:33.6 sec      Urinalysis Basic - ( 2021 19:42 )    Color: Clarice / Appearance: Slightly Turbid / S.030 / pH: x  Gluc: x / Ketone: Negative  / Bili: Small / Urobili: 3 mg/dL   Blood: x / Protein: 30 mg/dL / Nitrite: Negative   Leuk Esterase: Large / RBC: 2 /HPF /  /HPF   Sq Epi: x / Non Sq Epi: 1 /HPF / Bacteria: Many        Culture - Blood (collected 2021 23:37)  Source: .Blood Blood  Gram Stain (2021 12:19):    Growth in aerobic bottle: Gram Negative Rods  Preliminary Report (2021 12:19):    Growth in aerobic bottle: Gram Negative Rods    Culture - Blood (collected 2021 23:28)  Source: .Blood Blood-Venous  Gram Stain (2021 14:18):    Growth in aerobic bottle: Gram Negative Rods    Growth in anaerobic bottle: Gram Negative Rods  Preliminary Report (2021 14:18):    Growth in aerobic bottle: Gram Negative Rods    Growth in anaerobic bottle: Gram Negative Rods    ***Blood Panel PCR results on this specimen are available    approximately 3 hours after the Gram stain result.***    Gram stain, PCR, and/or culture results may not always    correspond due to difference in methodologies.    ************************************************************    This PCR assay was performed by multiplex PCR. This    Assay tests for 66 bacterial and resistance gene targets.    Please refer to the [facility] test directory    at [email or web address] for details.  Organism: Blood Culture PCR (2021 14:29)  Organism: Blood Culture PCR (2021 14:29)        RADIOLOGY & ADDITIONAL TESTS:

## 2021-02-08 NOTE — PROGRESS NOTE ADULT - SUBJECTIVE AND OBJECTIVE BOX
CC: Patient is a 80y old  Male who presents with a chief complaint of abdominal pain, emesis, dark stools (2021 08:53)    ID following for bacteremia    Interval History/ROS: Patient remains with right sided abd pain. No fevers.    Rest of ROS negative.    Allergies  No Known Allergies    ANTIMICROBIALS:  ceftazidime/avibactam IVPB 2.5 every 8 hours    OTHER MEDS:  acetaminophen   Tablet .. 650 milliGRAM(s) Oral every 8 hours PRN  ascorbic acid 500 milliGRAM(s) Oral daily  bisacodyl Suppository 10 milliGRAM(s) Rectal daily PRN  calcium carbonate 1250 mG  + Vitamin D (OsCal 500 + D) 1 Tablet(s) Oral daily  enoxaparin Injectable 40 milliGRAM(s) SubCutaneous daily  furosemide    Tablet 20 milliGRAM(s) Oral daily  gabapentin 100 milliGRAM(s) Oral three times a day  influenza   Vaccine 0.5 milliLiter(s) IntraMuscular once  lactulose Syrup 30 Gram(s) Oral two times a day  lidocaine   Patch 1 Patch Transdermal daily  multivitamin 1 Tablet(s) Oral daily  pantoprazole    Tablet 40 milliGRAM(s) Oral before breakfast  polyethylene glycol 3350 17 Gram(s) Oral daily  propranolol 10 milliGRAM(s) Oral two times a day  senna 2 Tablet(s) Oral at bedtime  sodium chloride 0.9%. 1000 milliLiter(s) IV Continuous <Continuous>    PE:    Vital Signs Last 24 Hrs  T(C): 36.8 (2021 14:21), Max: 37.7 (2021 17:34)  T(F): 98.2 (2021 14:21), Max: 99.8 (2021 17:34)  HR: 61 (2021 14:21) (55 - 64)  BP: 139/58 (2021 14:21) (114/57 - 151/70)  BP(mean): --  RR: 18 (2021 14:21) (18 - 18)  SpO2: 97% (2021 14:21) (96% - 97%)    Gen: AOx3, NAD  CV: S1+S2 normal, no murmurs  Resp: Clear bilat, no resp distress  Abd: Soft, right sided tenderness, +BS  Ext: No LE edema, no wounds  : No Garcia  IV/Skin: No thrombophlebitis  Neuro: no focal deficits    LABS:                          12.2   10.81 )-----------( 126      ( 2021 16:28 )             37.6       -    135  |  99  |  24<H>  ----------------------------<  116<H>  4.0   |  26  |  1.38<H>    Ca    9.2      2021 21:04  Phos  1.6       Mg     2.0         TPro  7.3  /  Alb  3.2<L>  /  TBili  5.3<H>  /  DBili  x   /  AST  89<H>  /  ALT  44<H>  /  AlkPhos  454<H>        Urinalysis Basic - ( 2021 19:42 )    Color: Clarice / Appearance: Slightly Turbid / S.030 / pH: x  Gluc: x / Ketone: Negative  / Bili: Small / Urobili: 3 mg/dL   Blood: x / Protein: 30 mg/dL / Nitrite: Negative   Leuk Esterase: Large / RBC: 2 /HPF /  /HPF   Sq Epi: x / Non Sq Epi: 1 /HPF / Bacteria: Many        MICROBIOLOGY:  v  .Blood Blood  21   Growth in aerobic bottle: Escherichia coli  See previous culture [id number]  --    Growth in aerobic bottle: Gram Negative Rods      .Blood Blood-Venous  21   Growth in aerobic bottle: Escherichia coli  Growth in anaerobic bottle: Klebsiella pneumoniae  Susceptibility to follow.  ***Blood Panel PCR results on this specimen are available  approximately 3 hours after the Gram stain result.***  Gram stain, PCR, and/or culture results may not always  correspond due to difference in methodologies.  ************************************************************  This PCR assay was performed by multiplex PCR. This  Assay tests for 66 bacterial and resistance gene targets.  Please refer to the [facility] test directory  at [email or web address] for details.  --  Blood Culture PCR      .Urine Clean Catch (Midstream)  21   >100,000 CFU/ml Gram Negative Rods  --  --    RADIOLOGY:    < from: Xray Chest 1 View- PORTABLE-Urgent (Xray Chest 1 View- PORTABLE-Urgent .) (21 @ 16:13) >  Clear lungs. No pleural effusions or pneumothorax.    Cardiac and mediastinal silhouettes within normal limits for the projection.    Trachea midline.    Generalized osteopenia again noted.    < end of copied text >

## 2021-02-08 NOTE — PROGRESS NOTE ADULT - ASSESSMENT
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 Found to have UTI and possible cholangitis.

## 2021-02-08 NOTE — PROGRESS NOTE ADULT - ASSESSMENT
80 year old male with cirrhosis (EtOH/ WALL), Dementia, CKD, prior right hip arthroplasty, previous ESBL E. coli UTIs presenting with abd pain and vomiting. CT A/P with mild biliary dilatation planned for MRCP. Also with RUQ tenderness on exam and elevated bili concerning for cholangitis/ cholecystitis. Blood culture positive for MDRO E. coli - [facility]     Recommend:  -Continue avycaz  -F/U additional resistance testing for avycaz, zerbaxa, polymyxin and vabomere   -Repeat blood cultures  -F/U GI/ hepatology  -F/U MRCP    Mateus Taylor MD  Pager [phone]  After 5pm/weekends call [phone]

## 2021-02-08 NOTE — PROGRESS NOTE ADULT - ATTENDING COMMENTS
80 y.o. M w/ a hx of cirrhosis c/b EV s/p banding, HTN currently hospitalized for E. coli bacteremia of either GI or  etiology, on Ceftazidime/Avibactam.   Patient feels well, no complaints. Labs pending. Awaiting MRI.     # MDRO E.coli bacteremia: GI v  source. UCx pending, LFT's elevated, awaiting MRI/MRCP to eval for intra-abdominal/biliary source. Cont Avycaz.   # KAY: Baseline Cr appears to be around 1, elevated to 1.38 yesterday. Await morning BMP. Will give gentle IVF, check bladder scan.   # Cirrhosis: Cont Lactulose, Propanolol. Hbg stable.     I have personally seen, examined and participated in the care of this patient. I have reviewed all pertinent clinical information, including history, physical exam, plan and the resident's note and agree except as noted.

## 2021-02-08 NOTE — PROGRESS NOTE ADULT - PROBLEM SELECTOR PLAN 1
MDRO E. coli bacteremia  - Started on avycaz (2/7 -  )  - F/u E coli sensitivities  - ID following, appreciate recs

## 2021-02-08 NOTE — PROGRESS NOTE ADULT - ASSESSMENT
IMPRESSION:   # Cholestatic Pattern of liver enzymes elevation: In the setting of mild intrahepatic biliary duct dilation seen on CT; suspect biliary obstruction. Differential includes secondary to acute cholecystitis given gallbladder wall thickening, but also includes choledocholithiasis. Less likely sepsis related.   # MDR Ecoli bacteremia: source of sepsis biliary vs urinary, now clinically improving MS on gram negative coverage -- will likely need broadening of abx due to culture data.   # Hx pancreatitis 2/2 choledocholithiasis  # EtOH cirrhosis: decompensated w/ variceal bleeding.  - MELD-Na 22 (2/6/21)  - varices: prior variceal bleeding, last EGD 4/2020 w/ small varices  - ascites: none  - SBP: no Hx  - HCC: neg CT 2/21  - HE: ? had some years ago, since then on lactulose once or twice daily for constipation    RECOMMENDATIONS:   - Follow up MRCP  - IV abx per ID   - Monitor WBC count, fever curve  - Trend CMP, CBC, INR daily   - Continue on lactulose BID, titrate 3-5 BMs daily     Meagan Pelletier MD  Gastroenterology Fellow  [phone]  Available on Microsoft Teams  Please page on call fellow on weekends and after 5pm on weekdays: [phone]

## 2021-02-08 NOTE — PROGRESS NOTE ADULT - PROBLEM SELECTOR PLAN 4
Pt presented with diffuse mild abdominal pain, worse in RUQ, 1x NBNB emesis, leukocytosis, subjective fevers/chills. Pt has known to have extensive GI hx.  - CT A/P w/ IV con (2/6/21): Mild intrahepatic biliary ductal dilation and irregular gallbladder wall  - Unclear etiology, possibly 2/2 cholangitis vs constipation vs gastroenteritis    - MRCP ordered   - GI consulted. Possible plan for ERCP

## 2021-02-09 DIAGNOSIS — K81.0 ACUTE CHOLECYSTITIS: ICD-10-CM

## 2021-02-09 LAB
-  AMIKACIN: SIGNIFICANT CHANGE UP
-  AMOXICILLIN/CLAVULANIC ACID: SIGNIFICANT CHANGE UP
-  AMPICILLIN/SULBACTAM: SIGNIFICANT CHANGE UP
-  AMPICILLIN: SIGNIFICANT CHANGE UP
-  AZTREONAM: SIGNIFICANT CHANGE UP
-  CEFAZOLIN: SIGNIFICANT CHANGE UP
-  CEFEPIME: SIGNIFICANT CHANGE UP
-  CEFOXITIN: SIGNIFICANT CHANGE UP
-  CEFTRIAXONE: SIGNIFICANT CHANGE UP
-  CIPROFLOXACIN: SIGNIFICANT CHANGE UP
-  ERTAPENEM: SIGNIFICANT CHANGE UP
-  GENTAMICIN: SIGNIFICANT CHANGE UP
-  IMIPENEM: SIGNIFICANT CHANGE UP
-  LEVOFLOXACIN: SIGNIFICANT CHANGE UP
-  MEROPENEM: SIGNIFICANT CHANGE UP
-  NITROFURANTOIN: SIGNIFICANT CHANGE UP
-  PIPERACILLIN/TAZOBACTAM: SIGNIFICANT CHANGE UP
-  TIGECYCLINE: SIGNIFICANT CHANGE UP
-  TOBRAMYCIN: SIGNIFICANT CHANGE UP
-  TRIMETHOPRIM/SULFAMETHOXAZOLE: SIGNIFICANT CHANGE UP
ALBUMIN SERPL ELPH-MCNC: 2.4 G/DL — LOW (ref 3.3–5)
ALP SERPL-CCNC: 322 U/L — HIGH (ref 40–120)
ALT FLD-CCNC: 26 U/L — SIGNIFICANT CHANGE UP (ref 4–41)
ANION GAP SERPL CALC-SCNC: 12 MMOL/L — SIGNIFICANT CHANGE UP (ref 7–14)
APTT BLD: 32.6 SEC — SIGNIFICANT CHANGE UP (ref 27–36.3)
AST SERPL-CCNC: 44 U/L — HIGH (ref 4–40)
BILIRUB SERPL-MCNC: 7.9 MG/DL — HIGH (ref 0.2–1.2)
BUN SERPL-MCNC: 21 MG/DL — SIGNIFICANT CHANGE UP (ref 7–23)
CALCIUM SERPL-MCNC: 8.8 MG/DL — SIGNIFICANT CHANGE UP (ref 8.4–10.5)
CHLORIDE SERPL-SCNC: 101 MMOL/L — SIGNIFICANT CHANGE UP (ref 98–107)
CO2 SERPL-SCNC: 22 MMOL/L — SIGNIFICANT CHANGE UP (ref 22–31)
CREAT SERPL-MCNC: 1.26 MG/DL — SIGNIFICANT CHANGE UP (ref 0.5–1.3)
CULTURE RESULTS: SIGNIFICANT CHANGE UP
GLUCOSE BLDC GLUCOMTR-MCNC: 75 MG/DL — SIGNIFICANT CHANGE UP (ref 70–99)
GLUCOSE BLDC GLUCOMTR-MCNC: 80 MG/DL — SIGNIFICANT CHANGE UP (ref 70–99)
GLUCOSE BLDC GLUCOMTR-MCNC: 91 MG/DL — SIGNIFICANT CHANGE UP (ref 70–99)
GLUCOSE SERPL-MCNC: 43 MG/DL — CRITICAL LOW (ref 70–99)
HCT VFR BLD CALC: 36.3 % — LOW (ref 39–50)
HGB BLD-MCNC: 11.3 G/DL — LOW (ref 13–17)
INR BLD: 1.9 RATIO — HIGH (ref 0.88–1.16)
MAGNESIUM SERPL-MCNC: 1.6 MG/DL — SIGNIFICANT CHANGE UP (ref 1.6–2.6)
MCHC RBC-ENTMCNC: 22.6 PG — LOW (ref 27–34)
MCHC RBC-ENTMCNC: 31.1 GM/DL — LOW (ref 32–36)
MCV RBC AUTO: 72.5 FL — LOW (ref 80–100)
METHOD TYPE: SIGNIFICANT CHANGE UP
NRBC # BLD: 0 /100 WBCS — SIGNIFICANT CHANGE UP
NRBC # FLD: 0 K/UL — SIGNIFICANT CHANGE UP
ORGANISM # SPEC MICROSCOPIC CNT: SIGNIFICANT CHANGE UP
ORGANISM # SPEC MICROSCOPIC CNT: SIGNIFICANT CHANGE UP
PHOSPHATE SERPL-MCNC: 2.4 MG/DL — LOW (ref 2.5–4.5)
PLATELET # BLD AUTO: 146 K/UL — LOW (ref 150–400)
POTASSIUM SERPL-MCNC: 3.1 MMOL/L — LOW (ref 3.5–5.3)
POTASSIUM SERPL-SCNC: 3.1 MMOL/L — LOW (ref 3.5–5.3)
PROT SERPL-MCNC: 6.4 G/DL — SIGNIFICANT CHANGE UP (ref 6–8.3)
PROTHROM AB SERPL-ACNC: 21 SEC — HIGH (ref 10.6–13.6)
RBC # BLD: 5.01 M/UL — SIGNIFICANT CHANGE UP (ref 4.2–5.8)
RBC # FLD: 21.1 % — HIGH (ref 10.3–14.5)
SODIUM SERPL-SCNC: 135 MMOL/L — SIGNIFICANT CHANGE UP (ref 135–145)
SPECIMEN SOURCE: SIGNIFICANT CHANGE UP
WBC # BLD: 11.07 K/UL — HIGH (ref 3.8–10.5)
WBC # FLD AUTO: 11.07 K/UL — HIGH (ref 3.8–10.5)

## 2021-02-09 PROCEDURE — 43274 ERCP DUCT STENT PLACEMENT: CPT | Mod: GC

## 2021-02-09 PROCEDURE — 99232 SBSQ HOSP IP/OBS MODERATE 35: CPT

## 2021-02-09 PROCEDURE — 99233 SBSQ HOSP IP/OBS HIGH 50: CPT | Mod: GC

## 2021-02-09 PROCEDURE — 74328 X-RAY BILE DUCT ENDOSCOPY: CPT | Mod: 26,GC

## 2021-02-09 RX ORDER — POTASSIUM CHLORIDE 20 MEQ
10 PACKET (EA) ORAL
Refills: 0 | Status: COMPLETED | OUTPATIENT
Start: 2021-02-09 | End: 2021-02-09

## 2021-02-09 RX ORDER — DEXTROSE 50 % IN WATER 50 %
25 SYRINGE (ML) INTRAVENOUS ONCE
Refills: 0 | Status: DISCONTINUED | OUTPATIENT
Start: 2021-02-09 | End: 2021-02-16

## 2021-02-09 RX ORDER — DEXTROSE 50 % IN WATER 50 %
12.5 SYRINGE (ML) INTRAVENOUS ONCE
Refills: 0 | Status: DISCONTINUED | OUTPATIENT
Start: 2021-02-09 | End: 2021-02-16

## 2021-02-09 RX ORDER — SODIUM CHLORIDE 9 MG/ML
1000 INJECTION INTRAMUSCULAR; INTRAVENOUS; SUBCUTANEOUS
Refills: 0 | Status: DISCONTINUED | OUTPATIENT
Start: 2021-02-09 | End: 2021-02-09

## 2021-02-09 RX ORDER — MEROPENEM-VABORBACTAM 1; 1 G/2G; G/2G
4 INJECTION, POWDER, FOR SOLUTION INTRAVENOUS EVERY 8 HOURS
Refills: 0 | Status: COMPLETED | OUTPATIENT
Start: 2021-02-09 | End: 2021-02-16

## 2021-02-09 RX ORDER — DEXTROSE 50 % IN WATER 50 %
15 SYRINGE (ML) INTRAVENOUS ONCE
Refills: 0 | Status: DISCONTINUED | OUTPATIENT
Start: 2021-02-09 | End: 2021-02-16

## 2021-02-09 RX ORDER — GLUCAGON INJECTION, SOLUTION 0.5 MG/.1ML
1 INJECTION, SOLUTION SUBCUTANEOUS ONCE
Refills: 0 | Status: DISCONTINUED | OUTPATIENT
Start: 2021-02-09 | End: 2021-02-16

## 2021-02-09 RX ORDER — SODIUM CHLORIDE 9 MG/ML
1000 INJECTION, SOLUTION INTRAVENOUS
Refills: 0 | Status: DISCONTINUED | OUTPATIENT
Start: 2021-02-09 | End: 2021-02-10

## 2021-02-09 RX ADMIN — Medication 100 MILLIEQUIVALENT(S): at 15:00

## 2021-02-09 RX ADMIN — Medication 100 MILLIEQUIVALENT(S): at 17:32

## 2021-02-09 RX ADMIN — SODIUM CHLORIDE 75 MILLILITER(S): 9 INJECTION, SOLUTION INTRAVENOUS at 13:20

## 2021-02-09 RX ADMIN — GABAPENTIN 100 MILLIGRAM(S): 400 CAPSULE ORAL at 06:17

## 2021-02-09 RX ADMIN — Medication 500 MILLIGRAM(S): at 11:28

## 2021-02-09 RX ADMIN — LACTULOSE 20 GRAM(S): 10 SOLUTION ORAL at 17:34

## 2021-02-09 RX ADMIN — SODIUM CHLORIDE 75 MILLILITER(S): 9 INJECTION, SOLUTION INTRAVENOUS at 10:59

## 2021-02-09 RX ADMIN — Medication 100 MILLIEQUIVALENT(S): at 11:27

## 2021-02-09 RX ADMIN — SODIUM CHLORIDE 75 MILLILITER(S): 9 INJECTION INTRAMUSCULAR; INTRAVENOUS; SUBCUTANEOUS at 06:16

## 2021-02-09 RX ADMIN — Medication 1 TABLET(S): at 11:28

## 2021-02-09 RX ADMIN — Medication 650 MILLIGRAM(S): at 06:17

## 2021-02-09 RX ADMIN — POLYETHYLENE GLYCOL 3350 17 GRAM(S): 17 POWDER, FOR SOLUTION ORAL at 11:28

## 2021-02-09 RX ADMIN — LACTULOSE 30 GRAM(S): 10 SOLUTION ORAL at 06:17

## 2021-02-09 RX ADMIN — PANTOPRAZOLE SODIUM 40 MILLIGRAM(S): 20 TABLET, DELAYED RELEASE ORAL at 06:17

## 2021-02-09 RX ADMIN — GABAPENTIN 100 MILLIGRAM(S): 400 CAPSULE ORAL at 17:35

## 2021-02-09 RX ADMIN — SODIUM CHLORIDE 75 MILLILITER(S): 9 INJECTION, SOLUTION INTRAVENOUS at 17:32

## 2021-02-09 RX ADMIN — MEROPENEM-VABORBACTAM 83.33 GRAM(S): 1; 1 INJECTION, POWDER, FOR SOLUTION INTRAVENOUS at 18:00

## 2021-02-09 RX ADMIN — GABAPENTIN 100 MILLIGRAM(S): 400 CAPSULE ORAL at 22:16

## 2021-02-09 NOTE — PROGRESS NOTE ADULT - PROBLEM SELECTOR PLAN 4
Pt presented with diffuse mild abdominal pain, worse in RUQ, 1x NBNB emesis, leukocytosis, subjective fevers/chills. Pt has known to have extensive GI hx.  - CT A/P w/ IV con (2/6/21): Mild intrahepatic biliary ductal dilation and irregular gallbladder wall  - Unclear etiology, possibly 2/2 cholangitis vs constipation vs gastroenteritis    - F/u MRCP read  - F/u GI recs Pt presented with RUQ pain, leukocytosis, subjective fevers/chills, elevated T.bili 4.7, direct 2.3 and indirect 2.4, Alk phos 516, , ALT 51. Pt has hx of porcelain gallbladder (but 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 in the past).  - CT A/P w/ IV con (2/6/21): Mild intrahepatic biliary ductal dilation and irregular gallbladder wall, new since 4/1/2020 with interval removal of pancreatic duct stent.  - Concern for possible cholangitis  - f/u BCx  - c/w Zosyn (2/6/21- )  - MRCP ordered  - GI possibly plan for ERCP

## 2021-02-09 NOTE — PROGRESS NOTE ADULT - PROBLEM SELECTOR PLAN 1
MDRO E. coli bacteremia  - Started on avycaz (2/7 -  )  - F/u E coli sensitivities  - ID following, appreciate recs Pt presented with diffuse mild abdominal pain, worse in RUQ, 1x NBNB emesis, leukocytosis, subjective fevers/chills. Pt has known to have extensive GI hx.  - CT A/P w/ IV con (2/6/21): Mild intrahepatic biliary ductal dilation and irregular gallbladder wall  -MRCP showing acute gangrenous cholecystitis with large stone in neck compressing common bile duct (Mirrizzi syndrome)  - Urgent ERCP today as per GI

## 2021-02-09 NOTE — CONSULT NOTE ADULT - ASSESSMENT
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 Now s/p ERCP with stent placement in CBD and evacuation of pus    Plan:  - Patient now decompressed through CBD via stent placed on ERCP  - Patient is a very poor surgical candidate   - Patient is hemodynamically stable, if decompensates can consider perc tavo  - Discussed with Dr Chapo Fong, PGY2  B Team Surgery [id number]
  IMPRESSION:   #Abnormal liver enzymes: pt w/ significant elevation of Tbili/Alk P compared to AST/ALT. CT a/p with distended GB + thickening c/f cholecystitis, mild intrahepatic biliary duct dilation seen. Will need to r/o choledocholithiasis  acute cholecystitis considering CT a/p findings v cholestasis of sepsis. [place] criteria 1, mild cholangitis if present -- no emergent indication for ERCP.   #MDR Ecoli bacteremia: source of sepsis biliary vs urinary, now clinically improving MS on gram negative coverage -- will likely need broadening of abx due to culture data.   #Hx pancreatitis 2/2 choledocholithiasis  #EtOH cirrhosis: decompensated w/ variceal bleeding.  - MELD-Na 22 (2/6/21)  - varices: prior variceal bleeding, last EGD 4/2020 w/ small varices  - ascites: none  - SBP: no Hx  - HCC: neg CT 2/21  - HE: ? had some years ago, since then on lactulose once or twice daily for constipation      RECOMMENDATIONS:   - Obtain MRCP  - Please keep NPO MN in case pt needs intervention in AM, re: ERCP w/ advanced GI  - IV abx per ID   - Monitor WBC count, fever curve  - Pt likely poor surgical candidate if acute cholecystitis, may need perc tavo  - Trend CMP, CBC, INR daily   - Continue on lactulose BID, titrate 3-5 BMs daily         Thank you for involving us in the care of this patient, please reach out if any further questions.     Jv Starkey MD  Gastroenterology Fellow, PGY4    Available on Microsoft Teams  [phone] ([facility])  [phone] ([facility])  Please contact on call fellow weekdays after 5pm-7am and weekends: [phone]      
80 M PMHx of Cirrhosis (ETOH/WALL), dementia, CKD, R hip arthroplasty (10/2020), previous ESBL E coli UTIs presented with diffuse abdominal pain and vomiting, found to have MDRO E coli bacteremia, most likely biliary source vs .    MDRO E coli bacteremia  -I spoke with core labs, informed that new MDRO PCR signals carbapenem resistance. Pt placed on Avycaz, core labs asked to release Avycaz, ceftolozane/tazobactam, Vabormere and Polymixin sensitivities when availale  -CT A/P with mild biliary dilation, significant new hyperbilirubinemia, primary team planning for MRCP. F/U GI recommendations  - could be source, pt with hx of ESBL E coli UTIs and UA with significant pyuria. Pt asymptomatic but unclear how reliable historian he is    Tachpynea  -mildly tachypneic on exam, would order CXR

## 2021-02-09 NOTE — PROGRESS NOTE ADULT - SUBJECTIVE AND OBJECTIVE BOX
CC: Patient is a 80y old  Male who presents with a chief complaint of abdominal pain, emesis, dark stools (09 Feb 2021 07:20)    ID following for bacteremia    Interval History/ROS: Patient seen early this morning. No fevers. Leukocytosis improving.     MRCP with Acute gangrenous cholecystitis with large gallstone in the neck compressing the common duct resulting in moderate intrahepatic biliary ductal dilatation (Mirizzi syndrome). In addition, gallbladder lumen appears to communicate with the common hepatic duct, suspicious for a cholecysto-choledochal fistula.    Rest of ROS negative.    Allergies  No Known Allergies    ANTIMICROBIALS:  meropenem/vaborbactam IVPB 4 every 8 hours    OTHER MEDS:  acetaminophen   Tablet .. 650 milliGRAM(s) Oral every 8 hours PRN  ascorbic acid 500 milliGRAM(s) Oral daily  bisacodyl Suppository 10 milliGRAM(s) Rectal daily PRN  calcium carbonate 1250 mG  + Vitamin D (OsCal 500 + D) 1 Tablet(s) Oral daily  dextrose 40% Gel 15 Gram(s) Oral once  dextrose 5% + sodium chloride 0.9%. 1000 milliLiter(s) IV Continuous <Continuous>  dextrose 50% Injectable 25 Gram(s) IV Push once  dextrose 50% Injectable 12.5 Gram(s) IV Push once  dextrose 50% Injectable 25 Gram(s) IV Push once  gabapentin 100 milliGRAM(s) Oral three times a day  glucagon  Injectable 1 milliGRAM(s) IntraMuscular once  influenza   Vaccine 0.5 milliLiter(s) IntraMuscular once  lactulose Syrup 30 Gram(s) Oral two times a day  lidocaine   Patch 1 Patch Transdermal daily  multivitamin 1 Tablet(s) Oral daily  pantoprazole    Tablet 40 milliGRAM(s) Oral before breakfast  polyethylene glycol 3350 17 Gram(s) Oral daily  potassium chloride  10 mEq/100 mL IVPB 10 milliEquivalent(s) IV Intermittent every 1 hour  propranolol 10 milliGRAM(s) Oral two times a day  senna 2 Tablet(s) Oral at bedtime    PE:    Vital Signs Last 24 Hrs  T(C): 35.7 (09 Feb 2021 14:55), Max: 36.8 (09 Feb 2021 12:38)  T(F): 96.3 (09 Feb 2021 14:55), Max: 98.2 (09 Feb 2021 12:38)  HR: 54 (09 Feb 2021 16:10) (50 - 68)  BP: 122/63 (09 Feb 2021 16:10) (92/49 - 153/62)  BP(mean): --  RR: 21 (09 Feb 2021 16:10) (17 - 22)  SpO2: 95% (09 Feb 2021 16:10) (92% - 99%)    Gen: AOx3, NAD  CV: S1+S2 normal, no murmurs  Resp: Clear bilat, no resp distress  Abd: Soft, Right sided abd tenderness, +BS  Ext: No LE edema, no wounds  : No Garcia  IV/Skin: No thrombophlebitis  Neuro: no focal deficits    LABS:                          11.3   11.07 )-----------( 146      ( 09 Feb 2021 09:09 )             36.3       02-09    135  |  101  |  21  ----------------------------<  43<LL>  3.1<L>   |  22  |  x     Ca    8.8      09 Feb 2021 09:09  Phos  2.4     02-09  Mg     2.0     02-07    TPro  6.4  /  Alb  2.4<L>  /  TBili  7.9<H>  /  DBili  x   /  AST  44<H>  /  ALT  26  /  AlkPhos  322<H>  02-09          MICROBIOLOGY:  v  .Blood Blood  02-06-21   Growth in aerobic bottle: Escherichia coli  See previous culture [id number]  --    Growth in aerobic bottle: Gram Negative Rods      .Blood Blood-Venous  02-06-21   Growth in aerobic bottle: Escherichia coli  Growth in anaerobic bottle: Klebsiella pneumoniae  Susceptibility to follow.  ***Blood Panel PCR results on this specimen are available  approximately 3 hours after the Gram stain result.***  Gram stain, PCR, and/or culture results may not always  correspond due to difference in methodologies.  ************************************************************  This PCR assay was performed by multiplex PCR. This  Assay tests for 66 bacterial and resistance gene targets.  Please refer to the [facility] test directory  at [email or web address] for details.  --  Blood Culture PCR      .Urine Clean Catch (Midstream)  02-06-21   >100,000 CFU/ml Escherichia coli  --  --    RADIOLOGY:  < from: MR MRCP w/wo IV Cont (02.08.21 @ 20:25) >  Acute gangrenous cholecystitis with large gallstone in the neck compressing the common duct resulting in moderate intrahepatic biliary ductal dilatation (Mirizzi syndrome). In addition, gallbladder lumen appears to communicate with the common hepatic duct, suspicious for a cholecysto-choledochal fistula.      < end of copied text >

## 2021-02-09 NOTE — CONSULT NOTE ADULT - REASON FOR ADMISSION
abdominal pain, emesis, dark stools

## 2021-02-09 NOTE — PROGRESS NOTE ADULT - ASSESSMENT
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 Found to have UTI and possible cholangitis.

## 2021-02-09 NOTE — CONSULT NOTE ADULT - ATTENDING COMMENTS
I have reviewed the history, pertinent labs and imaging, and discussed the care with the consult resident.    The active issues are:  1. choledocholithiasis, mirrizzi syndrome    The Acute Care Surgery (B Team) Attending Group Practice:  Dr. Emerson Beltran, Dr. Héctor Looney, Dr. Federico Montemayor, Dr. Yahir Cowan,     urgent issues - spectra [id number]  nonurgent issues - [phone]  patient appointments or afterhours - [phone]
80 year old male with cirrhosis (EtOH/ WALL), Dementia, CKD, prior right hip arthroplasty, previous ESBL E. coli UTIs presenting with abd pain and vomiting. CT A/P with mild biliary dilatation planned for MRCP. Also with RUQ tenderness on exam and elevated bili concerning for cholangitis. Blood culture positive for MDRO E. coli, discussed with lab and is carbapenem resistant. Start avycaz. Lab will run additional resistance testing for avycaz, zerbaxa, polymyxin and vabomere - f/u sensitivities. Repeat blood cultures. F/U GI/ hepatology.  On exam, patient also tachypneic while speaking - check cxr.    Mateus Taylor MD  Pager [phone]  After 5pm/weekends call [phone]

## 2021-02-09 NOTE — PROGRESS NOTE ADULT - PROBLEM SELECTOR PLAN 2
Pt presented with leukocytosis to WBC 13.49k, subjective fevers/chills/weakness, and +UA, likely 2/2 UTI  - s/p CTX 1g x1 and Zosyn x1 in ED  - Zosyn (2/6/21- 2/7/21 ); previous UCx in the past grew ESBL E.coli sensitive to Zosyn  - Tylenol PRN  - On avycaz now MDRO E. coli bacteremia  - Started on avycaz (2/7 -  )  - F/u E coli sensitivities  - ID following, appreciate recs

## 2021-02-09 NOTE — PROGRESS NOTE ADULT - ATTENDING COMMENTS
80 y.o. M w/ a hx of cirrhosis c/b EV s/p banding, HTN currently hospitalized for MDRO E.coli and Klebsiella bacteremia 2/2 gangrenous cholecystitis currently on Ceftazidime/Avibactam.   Patient with more RUQ pain, PE notable for RUQ, epigastric and LUQ pain. Labs notable for T.Bili 7.9, . 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     # MDRO polymicrobial bacteremia: Likely 2/2 gangrenous cholecystitis. Plan for ERCP today w/ GI. Surgery consulted. Cont Avycaz.   # KAY: Baseline Cr appears to be around 1, elevated to 1.38 yesterday. Await morning Cr. Will give gentle IVF, hold Lasix today while NPO.   # Hypoglycemia: Noted on AM labs, may be 2/2 infection. Monitor FS, continue D5 while NPO.   # Cirrhosis: Cont Lactulose, Propanolol. Hbg stable.     I have personally seen, examined and participated in the care of this patient. I have reviewed all pertinent clinical information, including history, physical exam, plan and the resident's note and agree except as noted.

## 2021-02-09 NOTE — PROGRESS NOTE ADULT - ASSESSMENT
80 year old male with cirrhosis (EtOH/ WALL), Dementia, CKD, prior right hip arthroplasty, previous ESBL E. coli UTIs presenting with abd pain and vomiting. CT A/P with mild biliary dilatation planned for MRCP. Also with RUQ tenderness on exam and elevated bili concerning for cholangitis/ cholecystitis. Blood culture positive for MDRO E. coli - [facility]     MRCP with acute gangrenous cholecystitis with large gallstone, also with cholecysto-choledochal fistula.    Recommend:  -Chagen avycaz to vabomere - order placed  -F/U additional resistance testing for avycaz, zerbaxa, polymyxin and vabomere   -Repeat blood cultures  -F/U GI/ hepatology - planned for ERCP  -Trend WBC - improving    Mateus Taylor MD  Pager [phone]  After 5pm/weekends call [phone]

## 2021-02-09 NOTE — PROGRESS NOTE ADULT - SUBJECTIVE AND OBJECTIVE BOX
PROGRESS NOTE:   Authored by Gorge Middleton MD  PGY-1, Internal Medicine  Pager [facility] [phone], [id number]     Patient is a 80y old  Male who presents with a chief complaint of abdominal pain, emesis, dark stools (08 Feb 2021 17:15)      SUBJECTIVE / OVERNIGHT EVENTS: No events overnight.    ADDITIONAL REVIEW OF SYSTEMS: Denies fevers, chills, n/v.    MEDICATIONS  (STANDING):  ascorbic acid 500 milliGRAM(s) Oral daily  calcium carbonate 1250 mG  + Vitamin D (OsCal 500 + D) 1 Tablet(s) Oral daily  ceftazidime/avibactam IVPB 2.5 Gram(s) IV Intermittent every 8 hours  enoxaparin Injectable 40 milliGRAM(s) SubCutaneous daily  gabapentin 100 milliGRAM(s) Oral three times a day  influenza   Vaccine 0.5 milliLiter(s) IntraMuscular once  lactulose Syrup 30 Gram(s) Oral two times a day  lidocaine   Patch 1 Patch Transdermal daily  multivitamin 1 Tablet(s) Oral daily  pantoprazole    Tablet 40 milliGRAM(s) Oral before breakfast  polyethylene glycol 3350 17 Gram(s) Oral daily  propranolol 10 milliGRAM(s) Oral two times a day  senna 2 Tablet(s) Oral at bedtime  sodium chloride 0.9%. 1000 milliLiter(s) (75 mL/Hr) IV Continuous <Continuous>    MEDICATIONS  (PRN):  acetaminophen   Tablet .. 650 milliGRAM(s) Oral every 8 hours PRN Temp greater or equal to 38C (100.4F), Mild Pain (1 - 3), Moderate Pain (4 - 6)  bisacodyl Suppository 10 milliGRAM(s) Rectal daily PRN Constipation      CAPILLARY BLOOD GLUCOSE        I&O's Summary    08 Feb 2021 07:01  -  09 Feb 2021 07:00  --------------------------------------------------------  IN: 0 mL / OUT: 250 mL / NET: -250 mL        PHYSICAL EXAM:  Vital Signs Last 24 Hrs  T(C): 36.7 (09 Feb 2021 06:11), Max: 37.3 (08 Feb 2021 10:17)  T(F): 98 (09 Feb 2021 06:11), Max: 99.1 (08 Feb 2021 10:17)  HR: 57 (09 Feb 2021 06:11) (55 - 61)  BP: 118/50 (09 Feb 2021 06:11) (114/57 - 153/62)  BP(mean): --  RR: 18 (09 Feb 2021 06:11) (18 - 18)  SpO2: 97% (09 Feb 2021 06:11) (96% - 99%)    CONSTITUTIONAL: NAD, lying in bed comfortably  RESPIRATORY: Normal respiratory effort; CTABL  CARDIOVASCULAR: Regular rate and rhythm, normal S1 and S2, no murmur/rub/gallop  ABDOMEN: Soft, nondistended, nontender to palpation, normoactive bowel sounds, no rebound/guarding  MUSCLOSKELETAL: no joint swelling or tenderness to palpation, FROM all extremities  NEURO: AAOx3 to person, place, and time, full and equal strength all extremities   EXTREMITIES: no pedal edema    LABS:                        12.2   10.81 )-----------( 126      ( 08 Feb 2021 16:28 )             37.6     02-08    138  |  101  |  21  ----------------------------<  83  3.6   |  23  |  1.29    Ca    8.8      08 Feb 2021 16:28  Phos  1.6     02-07  Mg     2.0     02-07    TPro  6.5  /  Alb  2.7<L>  /  TBili  7.3<H>  /  DBili  x   /  AST  53<H>  /  ALT  31  /  AlkPhos  353<H>  02-08              Culture - Blood (collected 06 Feb 2021 23:37)  Source: .Blood Blood  Gram Stain (07 Feb 2021 12:19):    Growth in aerobic bottle: Gram Negative Rods  Preliminary Report (08 Feb 2021 09:18):    Growth in aerobic bottle: Escherichia coli    See previous culture [id number]    Culture - Blood (collected 06 Feb 2021 23:28)  Source: .Blood Blood-Venous  Gram Stain (07 Feb 2021 14:18):    Growth in aerobic bottle: Gram Negative Rods    Growth in anaerobic bottle: Gram Negative Rods  Preliminary Report (08 Feb 2021 10:34):    Growth in aerobic bottle: Escherichia coli    Growth in anaerobic bottle: Klebsiella pneumoniae    Susceptibility to follow.    ***Blood Panel PCR results on this specimen are available    approximately 3 hours after the Gram stain result.***    Gram stain, PCR, and/or culture results may not always    correspond due to difference in methodologies.    ************************************************************    This PCR assay was performed by multiplex PCR. This    Assay tests for 66 bacterial and resistance gene targets.    Please refer to the [facility] test directory    at [email or web address] for details.  Organism: Blood Culture PCR (07 Feb 2021 14:29)  Organism: Blood Culture PCR (07 Feb 2021 14:29)    Culture - Urine (collected 06 Feb 2021 23:22)  Source: .Urine Clean Catch (Midstream)  Preliminary Report (08 Feb 2021 18:08):    >100,000 CFU/ml Escherichia coli        RADIOLOGY & ADDITIONAL TESTS:

## 2021-02-09 NOTE — CONSULT NOTE ADULT - SUBJECTIVE AND OBJECTIVE BOX
SURGERY CONSULT NOTE     HPI: 80 year old male with PMH of alcoholic cirrhosis ([place] C, MELD score 26) with previous complications of cirrhosis (GI bleed with variceal banding, hepatic encephalopathy), CKD, RBBB and previous choledocolithiasis s/p ERCP with stone extraction and stent who now presents with RUQ abdominal pain. He reported subjective fevers and chills. 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 He is now s/p ERCP with stenting of common bile duct. He reports his pain is better. Denies nausea and vomiting       PMHx: Melena    Hematochezia    Arteriovenous malformation (AVM)    Pancreatitis    Porcelain gallbladder    Liver cirrhosis    Guillain-[place] syndrome    HTN (hypertension)      PSHx: History of repair of hip fracture    H/O inguinal hernia repair      Medications (inpatient): ascorbic acid 500 milliGRAM(s) Oral daily  calcium carbonate 1250 mG  + Vitamin D (OsCal 500 + D) 1 Tablet(s) Oral daily  dextrose 40% Gel 15 Gram(s) Oral once  dextrose 5% + sodium chloride 0.9%. 1000 milliLiter(s) IV Continuous <Continuous>  dextrose 50% Injectable 25 Gram(s) IV Push once  dextrose 50% Injectable 12.5 Gram(s) IV Push once  dextrose 50% Injectable 25 Gram(s) IV Push once  gabapentin 100 milliGRAM(s) Oral three times a day  glucagon  Injectable 1 milliGRAM(s) IntraMuscular once  influenza   Vaccine 0.5 milliLiter(s) IntraMuscular once  lactulose Syrup 30 Gram(s) Oral two times a day  lidocaine   Patch 1 Patch Transdermal daily  meropenem/vaborbactam IVPB 4 Gram(s) IV Intermittent every 8 hours  multivitamin 1 Tablet(s) Oral daily  pantoprazole    Tablet 40 milliGRAM(s) Oral before breakfast  polyethylene glycol 3350 17 Gram(s) Oral daily  propranolol 10 milliGRAM(s) Oral two times a day  senna 2 Tablet(s) Oral at bedtime    Medications (PRN):acetaminophen   Tablet .. 650 milliGRAM(s) Oral every 8 hours PRN  bisacodyl Suppository 10 milliGRAM(s) Rectal daily PRN    Allergies: No Known Allergies  (Intolerances: )  Social Hx:   Family Hx: Family history of ovarian cancer (Sibling)        Physical Exam  T(C): 36.7  HR: 85 (50 - 85)  BP: 120/73 (92/49 - 133/72)  RR: 19 (17 - 22)  SpO2: 96% (92% - 99%)  Tmax: T(C): , Max: 36.8 (02-09-21 @ 12:38)    02-08-21  -  02-09-21  --------------------------------------------------------  IN:  Total IN: 0 mL    OUT:    Voided (mL): 250 mL  Total OUT: 250 mL    Total NET: -250 mL        General: well developed, well nourished, NAD  Neuro: alert and oriented, no focal deficits, moves all extremities spontaneously  Respiratory: airway patent, respirations unlabored  CVS: regular rate and rhythm  Abdomen: soft, distended, nontender on exam  Extremities: no edema, sensation and movement grossly intact  Skin: warm, dry, appropriate color    Labs:                        11.3   11.07 )-----------( 146      ( 09 Feb 2021 09:09 )             36.3     PT/INR - ( 09 Feb 2021 09:09 )   PT: 21.0 sec;   INR: 1.90 ratio         PTT - ( 09 Feb 2021 09:09 )  PTT:32.6 sec  02-09    135  |  101  |  21  ----------------------------<  43<LL>  3.1<L>   |  22  |  x     Ca    8.8      09 Feb 2021 09:09  Phos  2.4     02-09  Mg     2.0     02-07    TPro  6.4  /  Alb  2.4<L>  /  TBili  7.9<H>  /  DBili  x   /  AST  44<H>  /  ALT  26  /  AlkPhos  322<H>  02-09            Imaging and other studies:    < from: MR MRCP w/wo IV Cont (02.08.21 @ 20:25) >  FINDINGS:  LOWER CHEST: Within normal limits.    LIVER: Cirrhosis. No suspicious lesion is identified.  GALLBLADDER/BILE DUCTS: Distended gallbladder with irregular wall thickening. Large 4 cm gallstone in the neck compressing the common bile duct with resultant moderate intrahepatic biliary ductal dilatation. In addition, the gallbladder lumen appears to communicate with the common hepatic duct on the MRCP sequences, suspicious for a cholecysto-choledochal fistula.  SPLEEN: Within normal limits.  PANCREAS: Stable 7 mm pancreatic tail cyst. No pancreatic ductal dilatation.  ADRENALS: Within normal limits.  KIDNEYS/URETERS: No hydronephrosis.    VISUALIZED PORTIONS:  BOWEL: No bowel obstruction.  PERITONEUM: Trace perihepatic ascites.  VESSELS: Hepatic and portal veins are patent. Replaced right hepatic artery arising from the SMA..  RETROPERITONEUM/LYMPH NODES: No lymphadenopathy.  ABDOMINAL WALL: 3.7 cm right rectus sheath chronic hematoma, unchanged. Small fat-containing umbilical hernia  BONES: Within normal limits.    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 In addition, gallbladder lumen appears to communicate with the common hepatic duct, suspicious for a cholecysto-choledochal fistula.    < end of copied text >      < from: ERCP (02.09.21 @ 12:53) >  Findings:       EGD:       Normal esophagus       Normal stomach       Normal duodenum       ERCP:       The  film was normal       The duodenoscope was advanced to the level of the ampulla. Pus was seen        flowing from the ampulla       There was evidence of a previous sphincterotomy       The bile duct was cannulated using a Rx 44 sphincterotome with a 0.035        inch 260 cm wire       Thepancreatic duct was neither injected, nor cannulated       Contrast was injected into the bile duct. I acquired & interpreted the        fluoroscopic images. Images downloaded to PACS       The gallbladder filled and contained a large stone that was compressing        the bile duct.       A 10 F x 7 cm plastic stent was placed into the bile duct. The proximal        portion of the stent was above the compressed area in the bile duct.       Pus flowed through the stent.                                                        Impression:          EGD:                       Normal esophagus                       Normal stomach                       Normal duodenum                       ERCP:                       Previous sphincterotomy seen                       Exam c/w Mirizzi's syndrome and cholangitis.                       A 10 F x 7 cm plastic stent was placed into the bile                        duct.                       Pus flowed through the stent.  Recommendation:      - [facility].                       - Continue antibiotics                       - Management of cholecystitis/cholangitis per primary                        team/surgery    < end of copied text >

## 2021-02-10 LAB
-  AMIKACIN: SIGNIFICANT CHANGE UP
-  AMIKACIN: SIGNIFICANT CHANGE UP
-  AMPICILLIN/SULBACTAM: SIGNIFICANT CHANGE UP
-  AMPICILLIN/SULBACTAM: SIGNIFICANT CHANGE UP
-  AMPICILLIN: SIGNIFICANT CHANGE UP
-  AMPICILLIN: SIGNIFICANT CHANGE UP
-  AZTREONAM: SIGNIFICANT CHANGE UP
-  AZTREONAM: SIGNIFICANT CHANGE UP
-  CEFAZOLIN: SIGNIFICANT CHANGE UP
-  CEFAZOLIN: SIGNIFICANT CHANGE UP
-  CEFEPIME: SIGNIFICANT CHANGE UP
-  CEFEPIME: SIGNIFICANT CHANGE UP
-  CEFOXITIN: SIGNIFICANT CHANGE UP
-  CEFOXITIN: SIGNIFICANT CHANGE UP
-  CEFTAZIDIME/AVIBACTAM: SIGNIFICANT CHANGE UP
-  CEFTAZIDIME/AVIBACTAM: SIGNIFICANT CHANGE UP
-  CEFTOLOZANE/TAZOBACTAM: SIGNIFICANT CHANGE UP
-  CEFTRIAXONE: SIGNIFICANT CHANGE UP
-  CEFTRIAXONE: SIGNIFICANT CHANGE UP
-  CIPROFLOXACIN: SIGNIFICANT CHANGE UP
-  CIPROFLOXACIN: SIGNIFICANT CHANGE UP
-  ERTAPENEM: SIGNIFICANT CHANGE UP
-  ERTAPENEM: SIGNIFICANT CHANGE UP
-  GENTAMICIN: SIGNIFICANT CHANGE UP
-  GENTAMICIN: SIGNIFICANT CHANGE UP
-  IMIPENEM: SIGNIFICANT CHANGE UP
-  IMIPENEM: SIGNIFICANT CHANGE UP
-  LEVOFLOXACIN: SIGNIFICANT CHANGE UP
-  LEVOFLOXACIN: SIGNIFICANT CHANGE UP
-  MEROPENEM/VABORBACTAM: SIGNIFICANT CHANGE UP
-  MEROPENEM: SIGNIFICANT CHANGE UP
-  MEROPENEM: SIGNIFICANT CHANGE UP
-  PIPERACILLIN/TAZOBACTAM: SIGNIFICANT CHANGE UP
-  PIPERACILLIN/TAZOBACTAM: SIGNIFICANT CHANGE UP
-  TIGECYCLINE: SIGNIFICANT CHANGE UP
-  TOBRAMYCIN: SIGNIFICANT CHANGE UP
-  TOBRAMYCIN: SIGNIFICANT CHANGE UP
-  TRIMETHOPRIM/SULFAMETHOXAZOLE: SIGNIFICANT CHANGE UP
-  TRIMETHOPRIM/SULFAMETHOXAZOLE: SIGNIFICANT CHANGE UP
ALBUMIN SERPL ELPH-MCNC: 2.4 G/DL — LOW (ref 3.3–5)
ALP SERPL-CCNC: 292 U/L — HIGH (ref 40–120)
ALT FLD-CCNC: 27 U/L — SIGNIFICANT CHANGE UP (ref 4–41)
ANION GAP SERPL CALC-SCNC: 11 MMOL/L — SIGNIFICANT CHANGE UP (ref 7–14)
APTT BLD: 35.6 SEC — SIGNIFICANT CHANGE UP (ref 27–36.3)
AST SERPL-CCNC: 48 U/L — HIGH (ref 4–40)
BASOPHILS # BLD AUTO: 0.04 K/UL — SIGNIFICANT CHANGE UP (ref 0–0.2)
BASOPHILS NFR BLD AUTO: 0.4 % — SIGNIFICANT CHANGE UP (ref 0–2)
BILIRUB SERPL-MCNC: 7.1 MG/DL — HIGH (ref 0.2–1.2)
BLD GP AB SCN SERPL QL: NEGATIVE — SIGNIFICANT CHANGE UP
BUN SERPL-MCNC: 21 MG/DL — SIGNIFICANT CHANGE UP (ref 7–23)
CALCIUM SERPL-MCNC: 8.5 MG/DL — SIGNIFICANT CHANGE UP (ref 8.4–10.5)
CHLORIDE SERPL-SCNC: 107 MMOL/L — SIGNIFICANT CHANGE UP (ref 98–107)
CO2 SERPL-SCNC: 22 MMOL/L — SIGNIFICANT CHANGE UP (ref 22–31)
CREAT SERPL-MCNC: 1.17 MG/DL — SIGNIFICANT CHANGE UP (ref 0.5–1.3)
CULTURE RESULTS: SIGNIFICANT CHANGE UP
EOSINOPHIL # BLD AUTO: 0.35 K/UL — SIGNIFICANT CHANGE UP (ref 0–0.5)
EOSINOPHIL NFR BLD AUTO: 3.4 % — SIGNIFICANT CHANGE UP (ref 0–6)
GLUCOSE BLDC GLUCOMTR-MCNC: 111 MG/DL — HIGH (ref 70–99)
GLUCOSE BLDC GLUCOMTR-MCNC: 122 MG/DL — HIGH (ref 70–99)
GLUCOSE SERPL-MCNC: 123 MG/DL — HIGH (ref 70–99)
HCT VFR BLD CALC: 36.5 % — LOW (ref 39–50)
HGB BLD-MCNC: 11.7 G/DL — LOW (ref 13–17)
IANC: 7.26 K/UL — SIGNIFICANT CHANGE UP (ref 1.5–8.5)
IMM GRANULOCYTES NFR BLD AUTO: 2 % — HIGH (ref 0–1.5)
INR BLD: 1.96 RATIO — HIGH (ref 0.88–1.16)
LYMPHOCYTES # BLD AUTO: 1.17 K/UL — SIGNIFICANT CHANGE UP (ref 1–3.3)
LYMPHOCYTES # BLD AUTO: 11.3 % — LOW (ref 13–44)
MAGNESIUM SERPL-MCNC: 1.7 MG/DL — SIGNIFICANT CHANGE UP (ref 1.6–2.6)
MCHC RBC-ENTMCNC: 23.2 PG — LOW (ref 27–34)
MCHC RBC-ENTMCNC: 32.1 GM/DL — SIGNIFICANT CHANGE UP (ref 32–36)
MCV RBC AUTO: 72.4 FL — LOW (ref 80–100)
METHOD TYPE: SIGNIFICANT CHANGE UP
METHOD TYPE: SIGNIFICANT CHANGE UP
MONOCYTES # BLD AUTO: 1.3 K/UL — HIGH (ref 0–0.9)
MONOCYTES NFR BLD AUTO: 12.6 % — SIGNIFICANT CHANGE UP (ref 2–14)
NEUTROPHILS # BLD AUTO: 7.26 K/UL — SIGNIFICANT CHANGE UP (ref 1.8–7.4)
NEUTROPHILS NFR BLD AUTO: 70.3 % — SIGNIFICANT CHANGE UP (ref 43–77)
NRBC # BLD: 0 /100 WBCS — SIGNIFICANT CHANGE UP
NRBC # FLD: 0 K/UL — SIGNIFICANT CHANGE UP
ORGANISM # SPEC MICROSCOPIC CNT: SIGNIFICANT CHANGE UP
PHOSPHATE SERPL-MCNC: 1.8 MG/DL — LOW (ref 2.5–4.5)
PLATELET # BLD AUTO: 156 K/UL — SIGNIFICANT CHANGE UP (ref 150–400)
POTASSIUM SERPL-MCNC: 3.3 MMOL/L — LOW (ref 3.5–5.3)
POTASSIUM SERPL-SCNC: 3.3 MMOL/L — LOW (ref 3.5–5.3)
PROT SERPL-MCNC: 6.3 G/DL — SIGNIFICANT CHANGE UP (ref 6–8.3)
PROTHROM AB SERPL-ACNC: 21.8 SEC — HIGH (ref 10.6–13.6)
RBC # BLD: 5.04 M/UL — SIGNIFICANT CHANGE UP (ref 4.2–5.8)
RBC # FLD: 21.2 % — HIGH (ref 10.3–14.5)
RH IG SCN BLD-IMP: POSITIVE — SIGNIFICANT CHANGE UP
SODIUM SERPL-SCNC: 140 MMOL/L — SIGNIFICANT CHANGE UP (ref 135–145)
SPECIMEN SOURCE: SIGNIFICANT CHANGE UP
WBC # BLD: 10.33 K/UL — SIGNIFICANT CHANGE UP (ref 3.8–10.5)
WBC # FLD AUTO: 10.33 K/UL — SIGNIFICANT CHANGE UP (ref 3.8–10.5)

## 2021-02-10 PROCEDURE — 99232 SBSQ HOSP IP/OBS MODERATE 35: CPT

## 2021-02-10 PROCEDURE — 99232 SBSQ HOSP IP/OBS MODERATE 35: CPT | Mod: GC

## 2021-02-10 PROCEDURE — 99233 SBSQ HOSP IP/OBS HIGH 50: CPT | Mod: GC

## 2021-02-10 RX ORDER — SODIUM,POTASSIUM PHOSPHATES 278-250MG
1 POWDER IN PACKET (EA) ORAL
Refills: 0 | Status: COMPLETED | OUTPATIENT
Start: 2021-02-10 | End: 2021-02-11

## 2021-02-10 RX ORDER — POTASSIUM CHLORIDE 20 MEQ
40 PACKET (EA) ORAL EVERY 4 HOURS
Refills: 0 | Status: COMPLETED | OUTPATIENT
Start: 2021-02-10 | End: 2021-02-10

## 2021-02-10 RX ADMIN — GABAPENTIN 100 MILLIGRAM(S): 400 CAPSULE ORAL at 13:00

## 2021-02-10 RX ADMIN — Medication 40 MILLIEQUIVALENT(S): at 12:59

## 2021-02-10 RX ADMIN — LIDOCAINE 1 PATCH: 4 CREAM TOPICAL at 18:18

## 2021-02-10 RX ADMIN — SENNA PLUS 2 TABLET(S): 8.6 TABLET ORAL at 21:37

## 2021-02-10 RX ADMIN — MEROPENEM-VABORBACTAM 83.33 GRAM(S): 1; 1 INJECTION, POWDER, FOR SOLUTION INTRAVENOUS at 01:56

## 2021-02-10 RX ADMIN — Medication 1 TABLET(S): at 11:04

## 2021-02-10 RX ADMIN — Medication 40 MILLIEQUIVALENT(S): at 18:21

## 2021-02-10 RX ADMIN — Medication 1 TABLET(S): at 19:08

## 2021-02-10 RX ADMIN — Medication 1 TABLET(S): at 11:03

## 2021-02-10 RX ADMIN — MEROPENEM-VABORBACTAM 83.33 GRAM(S): 1; 1 INJECTION, POWDER, FOR SOLUTION INTRAVENOUS at 18:20

## 2021-02-10 RX ADMIN — LIDOCAINE 1 PATCH: 4 CREAM TOPICAL at 11:02

## 2021-02-10 RX ADMIN — MEROPENEM-VABORBACTAM 83.33 GRAM(S): 1; 1 INJECTION, POWDER, FOR SOLUTION INTRAVENOUS at 11:02

## 2021-02-10 RX ADMIN — GABAPENTIN 100 MILLIGRAM(S): 400 CAPSULE ORAL at 05:43

## 2021-02-10 RX ADMIN — Medication 1 TABLET(S): at 13:23

## 2021-02-10 RX ADMIN — GABAPENTIN 100 MILLIGRAM(S): 400 CAPSULE ORAL at 21:37

## 2021-02-10 RX ADMIN — Medication 1 TABLET(S): at 21:37

## 2021-02-10 RX ADMIN — Medication 500 MILLIGRAM(S): at 11:03

## 2021-02-10 NOTE — PROGRESS NOTE ADULT - SUBJECTIVE AND OBJECTIVE BOX
PROGRESS NOTE:   Authored by Gorge Middleton MD  PGY-1, Internal Medicine  Pager [facility] [phone], [place] [id number]     Patient is a 80y old  Male who presents with a chief complaint of abdominal pain, emesis, dark stools (09 Feb 2021 18:41)      SUBJECTIVE / OVERNIGHT EVENTS: No events overnight.    ADDITIONAL REVIEW OF SYSTEMS: Denies fevers, chills, n/v.    MEDICATIONS  (STANDING):  ascorbic acid 500 milliGRAM(s) Oral daily  calcium carbonate 1250 mG  + Vitamin D (OsCal 500 + D) 1 Tablet(s) Oral daily  dextrose 40% Gel 15 Gram(s) Oral once  dextrose 5% + sodium chloride 0.9%. 1000 milliLiter(s) (75 mL/Hr) IV Continuous <Continuous>  dextrose 50% Injectable 25 Gram(s) IV Push once  dextrose 50% Injectable 12.5 Gram(s) IV Push once  dextrose 50% Injectable 25 Gram(s) IV Push once  gabapentin 100 milliGRAM(s) Oral three times a day  glucagon  Injectable 1 milliGRAM(s) IntraMuscular once  influenza   Vaccine 0.5 milliLiter(s) IntraMuscular once  lactulose Syrup 30 Gram(s) Oral two times a day  lidocaine   Patch 1 Patch Transdermal daily  meropenem/vaborbactam IVPB 4 Gram(s) IV Intermittent every 8 hours  multivitamin 1 Tablet(s) Oral daily  pantoprazole    Tablet 40 milliGRAM(s) Oral before breakfast  polyethylene glycol 3350 17 Gram(s) Oral daily  propranolol 10 milliGRAM(s) Oral two times a day  senna 2 Tablet(s) Oral at bedtime    MEDICATIONS  (PRN):  acetaminophen   Tablet .. 650 milliGRAM(s) Oral every 8 hours PRN Temp greater or equal to 38C (100.4F), Mild Pain (1 - 3), Moderate Pain (4 - 6)  bisacodyl Suppository 10 milliGRAM(s) Rectal daily PRN Constipation      CAPILLARY BLOOD GLUCOSE      POCT Blood Glucose.: 111 mg/dL (10 Feb 2021 06:09)  POCT Blood Glucose.: 122 mg/dL (10 Feb 2021 01:13)  POCT Blood Glucose.: 80 mg/dL (09 Feb 2021 15:42)  POCT Blood Glucose.: 75 mg/dL (09 Feb 2021 12:17)  POCT Blood Glucose.: 91 mg/dL (09 Feb 2021 10:12)    I&O's Summary    09 Feb 2021 07:01  -  10 Feb 2021 07:00  --------------------------------------------------------  IN: 850 mL / OUT: 0 mL / NET: 850 mL        PHYSICAL EXAM:  Vital Signs Last 24 Hrs  T(C): 36.6 (10 Feb 2021 05:38), Max: 36.8 (09 Feb 2021 12:38)  T(F): 97.8 (10 Feb 2021 05:38), Max: 98.2 (09 Feb 2021 12:38)  HR: 61 (10 Feb 2021 05:38) (50 - 85)  BP: 135/85 (10 Feb 2021 05:38) (92/49 - 135/85)  BP(mean): --  RR: 18 (10 Feb 2021 05:38) (17 - 22)  SpO2: 98% (10 Feb 2021 05:38) (92% - 99%)    CONSTITUTIONAL: NAD, lying in bed comfortably  RESPIRATORY: Normal respiratory effort; CTABL  CARDIOVASCULAR: Regular rate and rhythm, normal S1 and S2, no murmur/rub/gallop  ABDOMEN: Soft, nondistended, nontender to palpation, normoactive bowel sounds, no rebound/guarding  MUSCLOSKELETAL: no joint swelling or tenderness to palpation, FROM all extremities  NEURO: AAOx3 to person, place, and time, full and equal strength all extremities   EXTREMITIES: no pedal edema    LABS:                        11.7   10.33 )-----------( 156      ( 10 Feb 2021 06:34 )             36.5     02-10    140  |  107  |  21  ----------------------------<  123<H>  3.3<L>   |  22  |  1.17    Ca    8.5      10 Feb 2021 06:34  Phos  1.8     02-10  Mg     1.7     02-10    TPro  6.3  /  Alb  2.4<L>  /  TBili  7.1<H>  /  DBili  x   /  AST  48<H>  /  ALT  27  /  AlkPhos  292<H>  02-10    PT/INR - ( 10 Feb 2021 06:34 )   PT: 21.8 sec;   INR: 1.96 ratio         PTT - ( 10 Feb 2021 06:34 )  PTT:35.6 sec            RADIOLOGY & ADDITIONAL TESTS:

## 2021-02-10 NOTE — PROGRESS NOTE ADULT - SUBJECTIVE AND OBJECTIVE BOX
ANESTHESIA POSTOP CHECK    80y Male POSTOP DAY 1 S/P     [ X] NO APPARENT ANESTHESIA COMPLICATIONS      Comments:

## 2021-02-10 NOTE — PROGRESS NOTE ADULT - PROBLEM SELECTOR PLAN 3
Pt presented with leukocytosis to WBC 13.49k, subjective fevers/chills/weakness, and +UA, likely 2/2 UTI  - s/p CTX 1g x1 and Zosyn x1 in ED  - Zosyn (2/6/21- 2/7/21 ); previous UCx in the past grew ESBL E.coli sensitive to Zosyn  - Tylenol PRN  - On vabomere now

## 2021-02-10 NOTE — PROGRESS NOTE ADULT - PROBLEM SELECTOR PLAN 2
MDRO polymicrobial bacteremia  - s/p avycaz (2/7 - 2/9)  - now on vabomere (2/9 - )  - F/u E coli sensitivities  - ID following, dino recs

## 2021-02-10 NOTE — PROGRESS NOTE ADULT - ASSESSMENT
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 Now S/p ERCP with stent placed.

## 2021-02-10 NOTE — PROGRESS NOTE ADULT - ASSESSMENT
IMPRESSION:   # Cholestatic Pattern of liver enzymes elevation secondary to Mirizzi's Syndrome with acute gangrenous cholecystitis s/p ERCP with stent placement  # Cholangitis s/p ERCP with stent placement complicated by MDR Ecoli bacteremia:  # Acute gangrenous cholecystitis  # EtOH cirrhosis: decompensated w/ variceal bleeding.  - MELD-Na 22 (2/6/21)  - varices: prior variceal bleeding, last EGD 4/2020 w/ small varices  - ascites: none  - SBP: no Hx  - HCC: neg CT 2/21  - HE: ? had some years ago, since then on lactulose once or twice daily for constipation    RECOMMENDATIONS:   - IV abx per ID   - Monitor WBC count, fever curve  - Trend CMP, CBC, INR daily   - Continue on lactulose BID, titrate 3-5 BMs daily   - Surgery recs reviewed  - Supportive care per primary team    Meagan Pelletier MD  Gastroenterology Fellow  [phone]  Available on [employer] Teams  Please page on call fellow on weekends and after 5pm on weekdays: [phone]

## 2021-02-10 NOTE — PROGRESS NOTE ADULT - SUBJECTIVE AND OBJECTIVE BOX
SURGERY PROGRESS NOTE    INTERVAL HPI/OVERNIGHT EVENTS: Patient seen and examined. Complains of RUQ pain. Has been NPO since midnight. No HA, CP, SOB, N/V/D/C/F/C.    Vital Signs Last 24 Hrs  T(C): 36.6 (10 Feb 2021 05:38), Max: 36.8 (09 Feb 2021 12:38)  T(F): 97.8 (10 Feb 2021 05:38), Max: 98.2 (09 Feb 2021 12:38)  HR: 61 (10 Feb 2021 05:38) (50 - 85)  BP: 135/85 (10 Feb 2021 05:38) (92/49 - 135/85)  BP(mean): --  RR: 18 (10 Feb 2021 05:38) (17 - 22)  SpO2: 98% (10 Feb 2021 05:38) (92% - 99%)  MEDICATIONS  (STANDING):  ascorbic acid 500 milliGRAM(s) Oral daily  calcium carbonate 1250 mG  + Vitamin D (OsCal 500 + D) 1 Tablet(s) Oral daily  dextrose 40% Gel 15 Gram(s) Oral once  dextrose 50% Injectable 25 Gram(s) IV Push once  dextrose 50% Injectable 12.5 Gram(s) IV Push once  dextrose 50% Injectable 25 Gram(s) IV Push once  gabapentin 100 milliGRAM(s) Oral three times a day  glucagon  Injectable 1 milliGRAM(s) IntraMuscular once  influenza   Vaccine 0.5 milliLiter(s) IntraMuscular once  lactulose Syrup 30 Gram(s) Oral two times a day  lidocaine   Patch 1 Patch Transdermal daily  meropenem/vaborbactam IVPB 4 Gram(s) IV Intermittent every 8 hours  multivitamin 1 Tablet(s) Oral daily  pantoprazole    Tablet 40 milliGRAM(s) Oral before breakfast  polyethylene glycol 3350 17 Gram(s) Oral daily  potassium phosphate / sodium phosphate Tablet (K-PHOS No. 2) 1 Tablet(s) Oral four times a day with meals  propranolol 10 milliGRAM(s) Oral two times a day  senna 2 Tablet(s) Oral at bedtime    MEDICATIONS  (PRN):  acetaminophen   Tablet .. 650 milliGRAM(s) Oral every 8 hours PRN Temp greater or equal to 38C (100.4F), Mild Pain (1 - 3), Moderate Pain (4 - 6)  bisacodyl Suppository 10 milliGRAM(s) Rectal daily PRN Constipation      PHYSICAL EXAM    General: NAD, Lying in bed comfortably  Neuro: AAO  Resp: Good effort, CTAB  GI/Abd: Soft, RUQ TTP, no rebound/guarding, no masses palpated  Musculoskeletal: All 4 extremities moving spontaneously, no limitations    I&O:  I&O's Detail    09 Feb 2021 07:01  -  10 Feb 2021 07:00  --------------------------------------------------------  IN:    dextrose 5% + sodium chloride 0.9%: 600 mL    IV PiggyBack: 250 mL  Total IN: 850 mL    OUT:  Total OUT: 0 mL    Total NET: 850 mL          LABS:                        11.7   10.33 )-----------( 156      ( 10 Feb 2021 06:34 )             36.5     02-10    140  |  107  |  21  ----------------------------<  123<H>  3.3<L>   |  22  |  1.17    Ca    8.5      10 Feb 2021 06:34  Phos  1.8     02-10  Mg     1.7     02-10    TPro  6.3  /  Alb  2.4<L>  /  TBili  7.1<H>  /  DBili  x   /  AST  48<H>  /  ALT  27  /  AlkPhos  292<H>  02-10    LIVER FUNCTIONS - ( 10 Feb 2021 06:34 )  Alb: 2.4 g/dL / Pro: 6.3 g/dL / ALK PHOS: 292 U/L / ALT: 27 U/L / AST: 48 U/L / GGT: x           PT/INR - ( 10 Feb 2021 06:34 )   PT: 21.8 sec;   INR: 1.96 ratio         PTT - ( 10 Feb 2021 06:34 )  PTT:35.6 sec          Impression: 80M post-procedure day 1 s/p ERCP with CBD stent for Mirizzi syndrome. Child C Cirrhotic, currently hemodynamically stable    Plan:  No acute indication for cholecystectomy. Would monitor patient for clinical improvement and resume PO diet when pain resolves.  If decompensates, would consult IR for percutaneous cholecystectomy, which would be lifelong.  Patient poor surgical candidate.   Please call with any questions/concerns.    Simone Can, PGY4  [id number]

## 2021-02-10 NOTE — PROGRESS NOTE ADULT - SUBJECTIVE AND OBJECTIVE BOX
Chief Complaint:  Patient is a 80y old  Male who presents with a chief complaint of abdominal pain, emesis, dark stools (10 Feb 2021 09:06)      Interval Events:   No acute overnight events    Allergies:  No Known Allergies      Hospital Medications:  acetaminophen   Tablet .. 650 milliGRAM(s) Oral every 8 hours PRN  ascorbic acid 500 milliGRAM(s) Oral daily  bisacodyl Suppository 10 milliGRAM(s) Rectal daily PRN  calcium carbonate 1250 mG  + Vitamin D (OsCal 500 + D) 1 Tablet(s) Oral daily  dextrose 40% Gel 15 Gram(s) Oral once  dextrose 50% Injectable 25 Gram(s) IV Push once  dextrose 50% Injectable 12.5 Gram(s) IV Push once  dextrose 50% Injectable 25 Gram(s) IV Push once  gabapentin 100 milliGRAM(s) Oral three times a day  glucagon  Injectable 1 milliGRAM(s) IntraMuscular once  influenza   Vaccine 0.5 milliLiter(s) IntraMuscular once  lactulose Syrup 30 Gram(s) Oral two times a day  lidocaine   Patch 1 Patch Transdermal daily  meropenem/vaborbactam IVPB 4 Gram(s) IV Intermittent every 8 hours  multivitamin 1 Tablet(s) Oral daily  pantoprazole    Tablet 40 milliGRAM(s) Oral before breakfast  polyethylene glycol 3350 17 Gram(s) Oral daily  potassium phosphate / sodium phosphate Tablet (K-PHOS No. 2) 1 Tablet(s) Oral four times a day with meals  propranolol 10 milliGRAM(s) Oral two times a day  senna 2 Tablet(s) Oral at bedtime      PMHX/PSHX:  Melena    Hematochezia    Arteriovenous malformation (AVM)    Pancreatitis    Porcelain gallbladder    Liver cirrhosis    Guillain-[place] syndrome    HTN (hypertension)    History of repair of hip fracture    H/O inguinal hernia repair        ROS:   General:  No fevers, chills or night sweats  ENT:  No sore throat or dysphagia  CV:  No pain or palpitations  Resp:  No dyspnea, cough or  wheezing  GI:  No pain, No nausea, No vomiting, No diarrhea, No rectal bleeding, No tarry stools,  Skin:  No rash or edema    PHYSICAL EXAM:   Vital Signs:  Vital Signs Last 24 Hrs  T(C): 36.6 (10 Feb 2021 05:38), Max: 36.8 (09 Feb 2021 12:38)  T(F): 97.8 (10 Feb 2021 05:38), Max: 98.2 (09 Feb 2021 12:38)  HR: 61 (10 Feb 2021 05:38) (50 - 85)  BP: 135/85 (10 Feb 2021 05:38) (92/49 - 135/85)  BP(mean): --  RR: 18 (10 Feb 2021 05:38) (17 - 22)  SpO2: 98% (10 Feb 2021 05:38) (92% - 99%)  Daily Height in cm: 172.72 (09 Feb 2021 12:38)    Daily     GENERAL:  NAD, Appears stated age  HEENT:  NC/AT,  conjunctivae clear and pink  CHEST:  Normal Effort, Normal rate  HEART:  RRR, S1 + S2  ABDOMEN:  Soft, non-tender, non-distended, BS+  EXTEREMITIES:  no cyanosis  SKIN:  Warm & Dry.  NEURO:  Alert, oriented    LABS:                        11.7   10.33 )-----------( 156      ( 10 Feb 2021 06:34 )             36.5     Mean Cell Volume: 72.4 fL (02-10-21 @ 06:34)    02-10    140  |  107  |  21  ----------------------------<  123<H>  3.3<L>   |  22  |  1.17    Ca    8.5      10 Feb 2021 06:34  Phos  1.8     02-10  Mg     1.7     02-10    TPro  6.3  /  Alb  2.4<L>  /  TBili  7.1<H>  /  DBili  x   /  AST  48<H>  /  ALT  27  /  AlkPhos  292<H>  02-10    LIVER FUNCTIONS - ( 10 Feb 2021 06:34 )  Alb: 2.4 g/dL / Pro: 6.3 g/dL / ALK PHOS: 292 U/L / ALT: 27 U/L / AST: 48 U/L / GGT: x           PT/INR - ( 10 Feb 2021 06:34 )   PT: 21.8 sec;   INR: 1.96 ratio         PTT - ( 10 Feb 2021 06:34 )  PTT:35.6 sec                            11.7   10.33 )-----------( 156      ( 10 Feb 2021 06:34 )             36.5                         11.3   11.07 )-----------( 146      ( 09 Feb 2021 09:09 )             36.3                         12.2   10.81 )-----------( 126      ( 08 Feb 2021 16:28 )             37.6       Imaging:

## 2021-02-10 NOTE — PROGRESS NOTE ADULT - ASSESSMENT
80 year old male with cirrhosis (EtOH/ WALL), Dementia, CKD, prior right hip arthroplasty, previous ESBL E. coli UTIs presenting with abd pain and vomiting. CT A/P with mild biliary dilatation planned for MRCP. Also with RUQ tenderness on exam and elevated bili concerning for cholangitis/ cholecystitis. Blood culture positive for MDRO E. coli - [facility]     MRCP with acute gangrenous cholecystitis with large gallstone, also with cholecysto-choledochal fistula.    ERCP with Mirizzi's syndrome  Now s/p biliary stent placement 2/9    Recommend:  -Continue vabomere - MDRO Kleb sensitive to vabomere   -F/U repeat blood cultures  -Trend WBC - improving  -Monitor for fevers  -When ready for discharge, can transition to oral bactrim/ flagyl to complete a total of 14 days of antibiotics.    Mateus Taylor MD  Pager [phone]  After 5pm/weekends call [phone]

## 2021-02-10 NOTE — PROGRESS NOTE ADULT - ATTENDING COMMENTS
80 y.o. M w/ a hx of cirrhosis c/b EV s/p banding, HTN currently hospitalized for MDRO E.coli and Klebsiella bacteremia 2/2 cholangitis currently on Vabomere now s/p ERCP w/ stenting.   Patient feels better today, has some RUQ pain w/ palpation.     # MDRO polymicrobial bacteremia 2/2 acute cholangitis: S/p ERCP & stent w/ GI. Cont. Vabomere. ID, GI following.   # Cirrhosis: Cont Lactulose, Propanolol. Hbg stable.     I have personally seen, examined and participated in the care of this patient. I have reviewed all pertinent clinical information, including history, physical exam, plan and the resident's note and agree except as noted.

## 2021-02-10 NOTE — PROGRESS NOTE ADULT - SUBJECTIVE AND OBJECTIVE BOX
CC: Patient is a 80y old  Male who presents with a chief complaint of abdominal pain, emesis, dark stools (10 Feb 2021 09:11)    ID following for bacteremia    Interval History/ROS: Patient s/p ERCP with consistent with Mirizzi's syndrome and cholangitis with a 1plastic stent was placed into the bile duct. Patient with improvement in right sided abd pain. Afebrile. Leukocytosis resolved.    Rest of ROS negative.    Allergies  No Known Allergies    ANTIMICROBIALS:  meropenem/vaborbactam IVPB 4 every 8 hours      OTHER MEDS:  acetaminophen   Tablet .. 650 milliGRAM(s) Oral every 8 hours PRN  ascorbic acid 500 milliGRAM(s) Oral daily  bisacodyl Suppository 10 milliGRAM(s) Rectal daily PRN  calcium carbonate 1250 mG  + Vitamin D (OsCal 500 + D) 1 Tablet(s) Oral daily  dextrose 40% Gel 15 Gram(s) Oral once  dextrose 50% Injectable 25 Gram(s) IV Push once  dextrose 50% Injectable 12.5 Gram(s) IV Push once  dextrose 50% Injectable 25 Gram(s) IV Push once  gabapentin 100 milliGRAM(s) Oral three times a day  glucagon  Injectable 1 milliGRAM(s) IntraMuscular once  influenza   Vaccine 0.5 milliLiter(s) IntraMuscular once  lactulose Syrup 30 Gram(s) Oral two times a day  lidocaine   Patch 1 Patch Transdermal daily  multivitamin 1 Tablet(s) Oral daily  pantoprazole    Tablet 40 milliGRAM(s) Oral before breakfast  polyethylene glycol 3350 17 Gram(s) Oral daily  potassium chloride    Tablet ER 40 milliEquivalent(s) Oral every 4 hours  potassium phosphate / sodium phosphate Tablet (K-PHOS No. 2) 1 Tablet(s) Oral four times a day with meals  propranolol 10 milliGRAM(s) Oral two times a day  senna 2 Tablet(s) Oral at bedtime    PE:    Vital Signs Last 24 Hrs  T(C): 36.7 (10 Feb 2021 14:02), Max: 36.7 (10 Feb 2021 14:02)  T(F): 98 (10 Feb 2021 14:02), Max: 98 (10 Feb 2021 14:02)  HR: 62 (10 Feb 2021 14:02) (54 - 62)  BP: 126/71 (10 Feb 2021 14:02) (113/50 - 135/85)  BP(mean): --  RR: 18 (10 Feb 2021 14:02) (18 - 18)  SpO2: 99% (10 Feb 2021 14:02) (98% - 99%)    Gen: AOx3, NAD  CV: S1+S2 normal, no murmurs  Resp: Clear bilat, no resp distress  Abd: Soft, nontender, +BS  Ext: No LE edema, no wounds  : No Garcia  IV/Skin: No thrombophlebitis  Neuro: no focal deficits    LABS:                          11.7   10.33 )-----------( 156      ( 10 Feb 2021 06:34 )             36.5       02-10    140  |  107  |  21  ----------------------------<  123<H>  3.3<L>   |  22  |  1.17    Ca    8.5      10 Feb 2021 06:34  Phos  1.8     02-10  Mg     1.7     02-10    TPro  6.3  /  Alb  2.4<L>  /  TBili  7.1<H>  /  DBili  x   /  AST  48<H>  /  ALT  27  /  AlkPhos  292<H>  02-10          MICROBIOLOGY:  v  .Blood Blood  02-09-21   No growth to date.  --  --      .Blood Blood  02-06-21   Growth in aerobic bottle: Escherichia coli  See previous culture [id number]  --    Growth in aerobic bottle: Gram Negative Rods      .Blood Blood-Venous  02-06-21   Growth in aerobic bottle: Escherichia coli  Growth in anaerobic bottle: Klebsiella pneumoniae (Carbapenem Resistant)  ***Blood Panel PCR results on this specimen are available  approximately 3 hours after the Gram stain result.***  Gram stain, PCR,and/or culture results may not always  correspond due to difference in methodologies.  ************************************************************  This PCR assay was performed by multiplex PCR. This  Assay tests for 66 bacterial and resistance genetargets.  Please refer to the [facility] test directory  at [email or web address] for details.  --  Blood Culture PCR  Escherichia coli  Klepne MDRO      .Urine Clean Catch (Midstream)  02-06-21   >100,000 CFU/ml Escherichia coli ESBL  --  Escherichia coli ESBL    RADIOLOGY:    < from: MR MRCP w/wo IV Cont (02.08.21 @ 20:25) >  IMPRESSION:  Acute gangrenous cholecystitis with large gallstone in the neck compressing the common duct resulting in moderate intrahepatic biliary ductal dilatation (Mirizzi syndrome). In addition, gallbladder lumen appears to communicate with the common hepatic duct, suspicious for a cholecysto-choledochal fistula.      < end of copied text >

## 2021-02-10 NOTE — PROGRESS NOTE ADULT - PROBLEM SELECTOR PLAN 1
Pt presented with diffuse mild abdominal pain, worse in RUQ, 1x NBNB emesis, leukocytosis, subjective fevers/chills. Pt has known to have extensive GI hx.  - CT A/P w/ IV con (2/6/21): Mild intrahepatic biliary ductal dilation and irregular gallbladder wall  -MRCP showing acute gangrenous cholecystitis with large stone in neck compressing common bile duct (Mirrizzi syndrome)  - S/p ERCP with stent

## 2021-02-11 LAB
-  STREPTOCOCCUS ANGINOSUS GROUP: SIGNIFICANT CHANGE UP
BASOPHILS # BLD AUTO: 0.06 K/UL — SIGNIFICANT CHANGE UP (ref 0–0.2)
BASOPHILS NFR BLD AUTO: 0.7 % — SIGNIFICANT CHANGE UP (ref 0–2)
EOSINOPHIL # BLD AUTO: 0.32 K/UL — SIGNIFICANT CHANGE UP (ref 0–0.5)
EOSINOPHIL NFR BLD AUTO: 3.5 % — SIGNIFICANT CHANGE UP (ref 0–6)
GRAM STN FLD: SIGNIFICANT CHANGE UP
HCT VFR BLD CALC: 37.1 % — LOW (ref 39–50)
HGB BLD-MCNC: 11.8 G/DL — LOW (ref 13–17)
IANC: 6.06 K/UL — SIGNIFICANT CHANGE UP (ref 1.5–8.5)
IMM GRANULOCYTES NFR BLD AUTO: 4 % — HIGH (ref 0–1.5)
LYMPHOCYTES # BLD AUTO: 1.38 K/UL — SIGNIFICANT CHANGE UP (ref 1–3.3)
LYMPHOCYTES # BLD AUTO: 15.1 % — SIGNIFICANT CHANGE UP (ref 13–44)
MCHC RBC-ENTMCNC: 23.1 PG — LOW (ref 27–34)
MCHC RBC-ENTMCNC: 31.8 GM/DL — LOW (ref 32–36)
MCV RBC AUTO: 72.6 FL — LOW (ref 80–100)
METHOD TYPE: SIGNIFICANT CHANGE UP
MONOCYTES # BLD AUTO: 0.97 K/UL — HIGH (ref 0–0.9)
MONOCYTES NFR BLD AUTO: 10.6 % — SIGNIFICANT CHANGE UP (ref 2–14)
NEUTROPHILS # BLD AUTO: 6.06 K/UL — SIGNIFICANT CHANGE UP (ref 1.8–7.4)
NEUTROPHILS NFR BLD AUTO: 66.1 % — SIGNIFICANT CHANGE UP (ref 43–77)
NRBC # BLD: 0 /100 WBCS — SIGNIFICANT CHANGE UP
NRBC # FLD: 0 K/UL — SIGNIFICANT CHANGE UP
PLATELET # BLD AUTO: 206 K/UL — SIGNIFICANT CHANGE UP (ref 150–400)
RBC # BLD: 5.11 M/UL — SIGNIFICANT CHANGE UP (ref 4.2–5.8)
RBC # FLD: 21.9 % — HIGH (ref 10.3–14.5)
WBC # BLD: 9.16 K/UL — SIGNIFICANT CHANGE UP (ref 3.8–10.5)
WBC # FLD AUTO: 9.16 K/UL — SIGNIFICANT CHANGE UP (ref 3.8–10.5)

## 2021-02-11 PROCEDURE — 99232 SBSQ HOSP IP/OBS MODERATE 35: CPT | Mod: GC

## 2021-02-11 PROCEDURE — 99233 SBSQ HOSP IP/OBS HIGH 50: CPT | Mod: GC

## 2021-02-11 RX ADMIN — LIDOCAINE 1 PATCH: 4 CREAM TOPICAL at 22:05

## 2021-02-11 RX ADMIN — Medication 1 TABLET(S): at 10:04

## 2021-02-11 RX ADMIN — MEROPENEM-VABORBACTAM 83.33 GRAM(S): 1; 1 INJECTION, POWDER, FOR SOLUTION INTRAVENOUS at 03:45

## 2021-02-11 RX ADMIN — LACTULOSE 30 GRAM(S): 10 SOLUTION ORAL at 06:06

## 2021-02-11 RX ADMIN — LIDOCAINE 1 PATCH: 4 CREAM TOPICAL at 10:04

## 2021-02-11 RX ADMIN — GABAPENTIN 100 MILLIGRAM(S): 400 CAPSULE ORAL at 12:02

## 2021-02-11 RX ADMIN — GABAPENTIN 100 MILLIGRAM(S): 400 CAPSULE ORAL at 22:05

## 2021-02-11 RX ADMIN — GABAPENTIN 100 MILLIGRAM(S): 400 CAPSULE ORAL at 06:06

## 2021-02-11 RX ADMIN — PANTOPRAZOLE SODIUM 40 MILLIGRAM(S): 20 TABLET, DELAYED RELEASE ORAL at 06:06

## 2021-02-11 RX ADMIN — MEROPENEM-VABORBACTAM 83.33 GRAM(S): 1; 1 INJECTION, POWDER, FOR SOLUTION INTRAVENOUS at 10:02

## 2021-02-11 RX ADMIN — POLYETHYLENE GLYCOL 3350 17 GRAM(S): 17 POWDER, FOR SOLUTION ORAL at 10:04

## 2021-02-11 RX ADMIN — Medication 500 MILLIGRAM(S): at 10:04

## 2021-02-11 RX ADMIN — MEROPENEM-VABORBACTAM 83.33 GRAM(S): 1; 1 INJECTION, POWDER, FOR SOLUTION INTRAVENOUS at 18:15

## 2021-02-11 NOTE — PROGRESS NOTE ADULT - ASSESSMENT
81 yo M w/ hx of alcoholic/WALL cirrhosis c/b variceal bleed s/p banding (8/2018) and hepatic encephalopathy (several years ago), dementia (AAOx2-3 baseline), alcohol abuse (sober since 5/2018), HTN, CKD3, RBBB, b/l knee OA, pseudogout, Guillain-[place] syndrome (1996), porcelain gallbladder (not a surgical candidate), H. 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 Now S/p ERCP with stent placed.

## 2021-02-11 NOTE — PROGRESS NOTE ADULT - PROBLEM SELECTOR PLAN 2
MDRO polymicrobial bacteremia  - s/p avycaz (2/7 - 2/9)  - now on vabomere (2/9 - 2/19)  - ID following, [place]

## 2021-02-11 NOTE — PROVIDER CONTACT NOTE (CRITICAL VALUE NOTIFICATION) - RECOMMENDATIONS
MD. Notified, Recommended to check blood glucose level
Continue with abx as ordered, continue to monitor for s/s of acute distress.

## 2021-02-11 NOTE — PROGRESS NOTE ADULT - PROBLEM SELECTOR PLAN 4
Pt presented with RUQ pain, leukocytosis, subjective fevers/chills, elevated T.bili 4.7, direct 2.3 and indirect 2.4, Alk phos 516, , ALT 51. Pt has hx of porcelain gallbladder (but 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 in the past).  - CT A/P w/ IV con (2/6/21): Mild intrahepatic biliary ductal dilation and irregular gallbladder wall, new since 4/1/2020 with interval removal of pancreatic duct stent.  - Concern for possible cholangitis  - f/u BCx  - MRCP ordered  - GI possibly plan for ERCP

## 2021-02-11 NOTE — PROGRESS NOTE ADULT - SUBJECTIVE AND OBJECTIVE BOX
Chief Complaint:  Patient is a 80y old  Male who presents with a chief complaint of abdominal pain, emesis, dark stools (11 Feb 2021 07:35)    Interval Events:   No acute overnight events  No fevers, chills, chest pain, abdominal pain, nausea, vomiting, diarrhea     Allergies:  No Known Allergies    Hospital Medications:  acetaminophen   Tablet .. 650 milliGRAM(s) Oral every 8 hours PRN  ascorbic acid 500 milliGRAM(s) Oral daily  bisacodyl Suppository 10 milliGRAM(s) Rectal daily PRN  calcium carbonate 1250 mG  + Vitamin D (OsCal 500 + D) 1 Tablet(s) Oral daily  dextrose 40% Gel 15 Gram(s) Oral once  dextrose 50% Injectable 25 Gram(s) IV Push once  dextrose 50% Injectable 12.5 Gram(s) IV Push once  dextrose 50% Injectable 25 Gram(s) IV Push once  gabapentin 100 milliGRAM(s) Oral three times a day  glucagon  Injectable 1 milliGRAM(s) IntraMuscular once  influenza   Vaccine 0.5 milliLiter(s) IntraMuscular once  lactulose Syrup 30 Gram(s) Oral two times a day  lidocaine   Patch 1 Patch Transdermal daily  meropenem/vaborbactam IVPB 4 Gram(s) IV Intermittent every 8 hours  multivitamin 1 Tablet(s) Oral daily  pantoprazole    Tablet 40 milliGRAM(s) Oral before breakfast  polyethylene glycol 3350 17 Gram(s) Oral daily  potassium phosphate / sodium phosphate Tablet (K-PHOS No. 2) 1 Tablet(s) Oral four times a day with meals  propranolol 10 milliGRAM(s) Oral two times a day  senna 2 Tablet(s) Oral at bedtime      PMHX/PSHX:  Melena    Hematochezia    Arteriovenous malformation (AVM)    Pancreatitis    Porcelain gallbladder    Liver cirrhosis    Guillain-[place] syndrome    HTN (hypertension)    History of repair of hip fracture    H/O inguinal hernia repair        ROS:   General:  No fevers, chills or night sweats  ENT:  No sore throat or dysphagia  CV:  No pain or palpitations  Resp:  No dyspnea, cough or  wheezing  GI:  No pain, No nausea, No vomiting, No diarrhea, No rectal bleeding, No tarry stools,  Skin:  No rash or edema    PHYSICAL EXAM:   Vital Signs:  Vital Signs Last 24 Hrs  T(C): 36.4 (11 Feb 2021 06:04), Max: 36.7 (10 Feb 2021 14:02)  T(F): 97.6 (11 Feb 2021 06:04), Max: 98 (10 Feb 2021 14:02)  HR: 57 (11 Feb 2021 06:04) (57 - 62)  BP: 120/70 (11 Feb 2021 06:04) (120/70 - 147/67)  BP(mean): --  RR: 18 (11 Feb 2021 06:04) (18 - 18)  SpO2: 97% (11 Feb 2021 06:04) (97% - 99%)  Daily     Daily     GENERAL:  NAD, Appears stated age  HEENT:  NC/AT,  conjunctivae clear and pink  CHEST:  Normal Effort, Normal rate  HEART:  RRR, S1 + S2  ABDOMEN:  Soft, non-tender, non-distended, BS+  EXTEREMITIES:  no cyanosis  SKIN:  Warm & Dry.  NEURO:  Alert, oriented    LABS:                        11.7   10.33 )-----------( 156      ( 10 Feb 2021 06:34 )             36.5       02-10    140  |  107  |  21  ----------------------------<  123<H>  3.3<L>   |  22  |  1.17    Ca    8.5      10 Feb 2021 06:34  Phos  1.8     02-10  Mg     1.7     02-10    TPro  6.3  /  Alb  2.4<L>  /  TBili  7.1<H>  /  DBili  x   /  AST  48<H>  /  ALT  27  /  AlkPhos  292<H>  02-10    LIVER FUNCTIONS - ( 10 Feb 2021 06:34 )  Alb: 2.4 g/dL / Pro: 6.3 g/dL / ALK PHOS: 292 U/L / ALT: 27 U/L / AST: 48 U/L / GGT: x           PT/INR - ( 10 Feb 2021 06:34 )   PT: 21.8 sec;   INR: 1.96 ratio         PTT - ( 10 Feb 2021 06:34 )  PTT:35.6 sec                            11.7   10.33 )-----------( 156      ( 10 Feb 2021 06:34 )             36.5                         11.3   11.07 )-----------( 146      ( 09 Feb 2021 09:09 )             36.3                         12.2   10.81 )-----------( 126      ( 08 Feb 2021 16:28 )             37.6       Imaging:

## 2021-02-11 NOTE — PROVIDER CONTACT NOTE (CRITICAL VALUE NOTIFICATION) - ACTION/TREATMENT ORDERED:
Continue with abx as ordered, continue to monitor for s/s of acute distress.  No further interventions at this time.
MD. Notified Checked blood glucose level and pt started on D5W/0.9 normal saline

## 2021-02-11 NOTE — PROGRESS NOTE ADULT - SUBJECTIVE AND OBJECTIVE BOX
PROGRESS NOTE:   Authored by Gorge Middleton MD  PGY-1, Internal Medicine  Pager [facility] [phone], J [id number]     Patient is a 80y old  Male who presents with a chief complaint of abdominal pain, emesis, dark stools (10 Feb 2021 18:06)      SUBJECTIVE / OVERNIGHT EVENTS: No events overnight.    ADDITIONAL REVIEW OF SYSTEMS: Denies fevers, chills, n/v.    MEDICATIONS  (STANDING):  ascorbic acid 500 milliGRAM(s) Oral daily  calcium carbonate 1250 mG  + Vitamin D (OsCal 500 + D) 1 Tablet(s) Oral daily  dextrose 40% Gel 15 Gram(s) Oral once  dextrose 50% Injectable 25 Gram(s) IV Push once  dextrose 50% Injectable 12.5 Gram(s) IV Push once  dextrose 50% Injectable 25 Gram(s) IV Push once  gabapentin 100 milliGRAM(s) Oral three times a day  glucagon  Injectable 1 milliGRAM(s) IntraMuscular once  influenza   Vaccine 0.5 milliLiter(s) IntraMuscular once  lactulose Syrup 30 Gram(s) Oral two times a day  lidocaine   Patch 1 Patch Transdermal daily  meropenem/vaborbactam IVPB 4 Gram(s) IV Intermittent every 8 hours  multivitamin 1 Tablet(s) Oral daily  pantoprazole    Tablet 40 milliGRAM(s) Oral before breakfast  polyethylene glycol 3350 17 Gram(s) Oral daily  potassium phosphate / sodium phosphate Tablet (K-PHOS No. 2) 1 Tablet(s) Oral four times a day with meals  propranolol 10 milliGRAM(s) Oral two times a day  senna 2 Tablet(s) Oral at bedtime    MEDICATIONS  (PRN):  acetaminophen   Tablet .. 650 milliGRAM(s) Oral every 8 hours PRN Temp greater or equal to 38C (100.4F), Mild Pain (1 - 3), Moderate Pain (4 - 6)  bisacodyl Suppository 10 milliGRAM(s) Rectal daily PRN Constipation      CAPILLARY BLOOD GLUCOSE        I&O's Summary    10 Feb 2021 07:01  -  11 Feb 2021 07:00  --------------------------------------------------------  IN: 0 mL / OUT: 550 mL / NET: -550 mL        PHYSICAL EXAM:  Vital Signs Last 24 Hrs  T(C): 36.4 (11 Feb 2021 06:04), Max: 36.7 (10 Feb 2021 14:02)  T(F): 97.6 (11 Feb 2021 06:04), Max: 98 (10 Feb 2021 14:02)  HR: 57 (11 Feb 2021 06:04) (57 - 62)  BP: 120/70 (11 Feb 2021 06:04) (120/70 - 147/67)  BP(mean): --  RR: 18 (11 Feb 2021 06:04) (18 - 18)  SpO2: 97% (11 Feb 2021 06:04) (97% - 99%)    CONSTITUTIONAL: NAD, lying in bed comfortably  RESPIRATORY: Normal respiratory effort; CTABL  CARDIOVASCULAR: Regular rate and rhythm, normal S1 and S2, no murmur/rub/gallop  ABDOMEN: Soft, nondistended, nontender to palpation, normoactive bowel sounds, no rebound/guarding  MUSCLOSKELETAL: no joint swelling or tenderness to palpation, FROM all extremities  NEURO: AAOx2  EXTREMITIES: no pedal edema    LABS:                        11.7   10.33 )-----------( 156      ( 10 Feb 2021 06:34 )             36.5     02-10    140  |  107  |  21  ----------------------------<  123<H>  3.3<L>   |  22  |  1.17    Ca    8.5      10 Feb 2021 06:34  Phos  1.8     02-10  Mg     1.7     02-10    TPro  6.3  /  Alb  2.4<L>  /  TBili  7.1<H>  /  DBili  x   /  AST  48<H>  /  ALT  27  /  AlkPhos  292<H>  02-10    PT/INR - ( 10 Feb 2021 06:34 )   PT: 21.8 sec;   INR: 1.96 ratio         PTT - ( 10 Feb 2021 06:34 )  PTT:35.6 sec          Culture - Blood (collected 09 Feb 2021 12:03)  Source: .Blood Blood-Venous  Gram Stain (11 Feb 2021 03:15):    Growth in anaerobic bottle: Gram Positive Cocci in Pairs and Chains  Preliminary Report (11 Feb 2021 03:15):    Growth in anaerobic bottle: Gram Positive Cocci in Pairs and Chains    ***Blood Panel PCR results on this specimen are available    approximately 3 hours after the Gram stain result.***    Gram stain, PCR, and/or culture results may not always    correspond due to difference in methodologies.    ************************************************************    This PCR assay was performed by multiplex PCR. This    Assay tests for 66 bacterial and resistance gene targets.    Please refer to the [facility] [employer] test directory    at [email or web address] for details.  Organism: Blood Culture PCR (11 Feb 2021 04:12)  Organism: Blood Culture PCR (11 Feb 2021 04:12)    Culture - Blood (collected 09 Feb 2021 12:03)  Source: .Blood Blood  Preliminary Report (10 Feb 2021 13:01):    No growth to date.        RADIOLOGY & ADDITIONAL TESTS:

## 2021-02-11 NOTE — PROVIDER CONTACT NOTE (CRITICAL VALUE NOTIFICATION) - ASSESSMENT
Pt alert and oriented x 4, Lethargic
Pt. is currently resting comfortably with no s/s of acute distress.

## 2021-02-11 NOTE — PROVIDER CONTACT NOTE (CRITICAL VALUE NOTIFICATION) - BACKGROUND
Pt. admitted for abd pain, NBNB emesis, & dark stools.  Found to be positive for UTI, MDRO in blood.  Currently receiving meropenem/vaborbactam

## 2021-02-11 NOTE — PROGRESS NOTE ADULT - ATTENDING COMMENTS
80 y.o. M w/ a hx of cirrhosis c/b EV s/p banding, HTN currently hospitalized for MDRO E.coli and Klebsiella bacteremia 2/2 cholangitis currently on Vabomere now s/p ERCP w/ stenting.   Patient feels about the same, has some continued RUQ/epigastric pain though he believes it is getting better. Strep anginosus isolated on BCx.     # MDRO polymicrobial bacteremia 2/2 acute cholangitis: S/p ERCP & stent w/ GI. Cont. Vabomere. ID, GI following. Repeat Bcx for clearance of Strep. TTE pending.    # Cirrhosis: Cont Lactulose, Propanolol. Hbg stable.     I have personally seen, examined and participated in the care of this patient. I have reviewed all pertinent clinical information, including history, physical exam, plan and the resident's note and agree except as noted.

## 2021-02-11 NOTE — PROGRESS NOTE ADULT - ATTENDING COMMENTS
Patient seen and examined with the GI fellow. I agree with the above assessment and plan. Thank you for allowing us to care for your patient.    Post ERCP with Mirizzi's syndrome s/p stenting doing well.

## 2021-02-11 NOTE — PROVIDER CONTACT NOTE (CRITICAL VALUE NOTIFICATION) - SITUATION
blood culture positive in arobic bottle of gram negative rods
Pt. found to have positive BC with gram + cocci in pairs & chains in the anaerobic bottle.
Pt glucose level is 43

## 2021-02-11 NOTE — PROGRESS NOTE ADULT - ASSESSMENT
IMPRESSION:   # Cholestatic Pattern of liver enzymes elevation secondary to Mirizzi's Syndrome with acute gangrenous cholecystitis s/p ERCP with stent placement  # Cholangitis s/p ERCP with stent placement complicated by MDR Ecoli bacteremia:  # Acute gangrenous cholecystitis  # EtOH cirrhosis: decompensated w/ variceal bleeding.  - MELD-Na 22 (2/6/21)  - varices: prior variceal bleeding, last EGD 4/2020 w/ small varices  - ascites: none  - SBP: no Hx  - HCC: neg CT 2/21  - HE: ? had some years ago, since then on lactulose once or twice daily for constipation    RECOMMENDATIONS:   - Follow up AM CMP  - IV abx per ID   - Trend CMP, CBC, INR daily   - Continue on lactulose BID, titrate 3-5 BMs daily   - Supportive care per primary team    Meagan Pelletier MD  Gastroenterology Fellow  [phone]  Available on [employer] Teams  Please page on call fellow on weekends and after 5pm on weekdays: [phone] IMPRESSION:   # Cholestatic Pattern of liver enzymes elevation secondary to Mirizzi's Syndrome with acute gangrenous cholecystitis s/p ERCP with stent placement  # Cholangitis s/p ERCP with stent placement complicated by MDR Ecoli bacteremia:  # Streptococcus Bacteremia  # Acute gangrenous cholecystitis  # EtOH cirrhosis: decompensated w/ variceal bleeding.  - MELD-Na 22 (2/6/21)  - varices: prior variceal bleeding, last EGD 4/2020 w/ small varices  - ascites: none  - SBP: no Hx  - HCC: neg CT 2/21  - HE: ? had some years ago, since then on lactulose once or twice daily for constipation    RECOMMENDATIONS:   - Follow up AM CMP  - IV abx per ID   - Trend CMP, CBC, INR daily   - Continue on lactulose BID, titrate 3-5 BMs daily   - Supportive care per primary team    Meagan Pelletier MD  Gastroenterology Fellow  [phone]  Available on [employer] Teams  Please page on call fellow on weekends and after 5pm on weekdays: [phone]

## 2021-02-12 LAB
-  CEFTRIAXONE: SIGNIFICANT CHANGE UP
-  CLINDAMYCIN: SIGNIFICANT CHANGE UP
-  ERYTHROMYCIN: SIGNIFICANT CHANGE UP
-  LEVOFLOXACIN: SIGNIFICANT CHANGE UP
-  PENICILLIN: SIGNIFICANT CHANGE UP
-  VANCOMYCIN: SIGNIFICANT CHANGE UP
ALBUMIN SERPL ELPH-MCNC: 2.1 G/DL — LOW (ref 3.3–5)
ALP SERPL-CCNC: 366 U/L — HIGH (ref 40–120)
ALT FLD-CCNC: 37 U/L — SIGNIFICANT CHANGE UP (ref 4–41)
ANION GAP SERPL CALC-SCNC: 10 MMOL/L — SIGNIFICANT CHANGE UP (ref 7–14)
APTT BLD: 33.7 SEC — SIGNIFICANT CHANGE UP (ref 27–36.3)
AST SERPL-CCNC: 91 U/L — HIGH (ref 4–40)
BILIRUB SERPL-MCNC: 5.9 MG/DL — HIGH (ref 0.2–1.2)
BUN SERPL-MCNC: 15 MG/DL — SIGNIFICANT CHANGE UP (ref 7–23)
CALCIUM SERPL-MCNC: 8.7 MG/DL — SIGNIFICANT CHANGE UP (ref 8.4–10.5)
CHLORIDE SERPL-SCNC: 106 MMOL/L — SIGNIFICANT CHANGE UP (ref 98–107)
CO2 SERPL-SCNC: 22 MMOL/L — SIGNIFICANT CHANGE UP (ref 22–31)
CREAT SERPL-MCNC: 0.97 MG/DL — SIGNIFICANT CHANGE UP (ref 0.5–1.3)
CULTURE RESULTS: SIGNIFICANT CHANGE UP
GLUCOSE SERPL-MCNC: 88 MG/DL — SIGNIFICANT CHANGE UP (ref 70–99)
HCT VFR BLD CALC: 35.8 % — LOW (ref 39–50)
HGB BLD-MCNC: 11.4 G/DL — LOW (ref 13–17)
INR BLD: 1.62 RATIO — HIGH (ref 0.88–1.16)
MAGNESIUM SERPL-MCNC: 1.6 MG/DL — SIGNIFICANT CHANGE UP (ref 1.6–2.6)
MCHC RBC-ENTMCNC: 23.3 PG — LOW (ref 27–34)
MCHC RBC-ENTMCNC: 31.8 GM/DL — LOW (ref 32–36)
MCV RBC AUTO: 73.1 FL — LOW (ref 80–100)
METHOD TYPE: SIGNIFICANT CHANGE UP
METHOD TYPE: SIGNIFICANT CHANGE UP
NRBC # BLD: 0 /100 WBCS — SIGNIFICANT CHANGE UP
NRBC # FLD: 0 K/UL — SIGNIFICANT CHANGE UP
ORGANISM # SPEC MICROSCOPIC CNT: SIGNIFICANT CHANGE UP
PHOSPHATE SERPL-MCNC: 1.8 MG/DL — LOW (ref 2.5–4.5)
PLATELET # BLD AUTO: 185 K/UL — SIGNIFICANT CHANGE UP (ref 150–400)
POTASSIUM SERPL-MCNC: 4.1 MMOL/L — SIGNIFICANT CHANGE UP (ref 3.5–5.3)
POTASSIUM SERPL-SCNC: 4.1 MMOL/L — SIGNIFICANT CHANGE UP (ref 3.5–5.3)
PROT SERPL-MCNC: 6.2 G/DL — SIGNIFICANT CHANGE UP (ref 6–8.3)
PROTHROM AB SERPL-ACNC: 18.2 SEC — HIGH (ref 10.6–13.6)
RBC # BLD: 4.9 M/UL — SIGNIFICANT CHANGE UP (ref 4.2–5.8)
RBC # FLD: 21.8 % — HIGH (ref 10.3–14.5)
SODIUM SERPL-SCNC: 138 MMOL/L — SIGNIFICANT CHANGE UP (ref 135–145)
SPECIMEN SOURCE: SIGNIFICANT CHANGE UP
WBC # BLD: 8.79 K/UL — SIGNIFICANT CHANGE UP (ref 3.8–10.5)
WBC # FLD AUTO: 8.79 K/UL — SIGNIFICANT CHANGE UP (ref 3.8–10.5)

## 2021-02-12 PROCEDURE — 99232 SBSQ HOSP IP/OBS MODERATE 35: CPT

## 2021-02-12 PROCEDURE — 99232 SBSQ HOSP IP/OBS MODERATE 35: CPT | Mod: GC

## 2021-02-12 PROCEDURE — 99233 SBSQ HOSP IP/OBS HIGH 50: CPT | Mod: GC

## 2021-02-12 RX ORDER — POTASSIUM PHOSPHATE, MONOBASIC POTASSIUM PHOSPHATE, DIBASIC 236; 224 MG/ML; MG/ML
15 INJECTION, SOLUTION INTRAVENOUS ONCE
Refills: 0 | Status: COMPLETED | OUTPATIENT
Start: 2021-02-12 | End: 2021-02-12

## 2021-02-12 RX ADMIN — LACTULOSE 30 GRAM(S): 10 SOLUTION ORAL at 05:26

## 2021-02-12 RX ADMIN — GABAPENTIN 100 MILLIGRAM(S): 400 CAPSULE ORAL at 23:06

## 2021-02-12 RX ADMIN — MEROPENEM-VABORBACTAM 83.33 GRAM(S): 1; 1 INJECTION, POWDER, FOR SOLUTION INTRAVENOUS at 18:10

## 2021-02-12 RX ADMIN — POTASSIUM PHOSPHATE, MONOBASIC POTASSIUM PHOSPHATE, DIBASIC 62.5 MILLIMOLE(S): 236; 224 INJECTION, SOLUTION INTRAVENOUS at 13:17

## 2021-02-12 RX ADMIN — MEROPENEM-VABORBACTAM 83.33 GRAM(S): 1; 1 INJECTION, POWDER, FOR SOLUTION INTRAVENOUS at 12:45

## 2021-02-12 RX ADMIN — Medication 1 TABLET(S): at 13:17

## 2021-02-12 RX ADMIN — Medication 500 MILLIGRAM(S): at 13:17

## 2021-02-12 RX ADMIN — LIDOCAINE 1 PATCH: 4 CREAM TOPICAL at 13:15

## 2021-02-12 RX ADMIN — PANTOPRAZOLE SODIUM 40 MILLIGRAM(S): 20 TABLET, DELAYED RELEASE ORAL at 05:27

## 2021-02-12 RX ADMIN — LIDOCAINE 1 PATCH: 4 CREAM TOPICAL at 17:32

## 2021-02-12 RX ADMIN — Medication 650 MILLIGRAM(S): at 23:06

## 2021-02-12 RX ADMIN — GABAPENTIN 100 MILLIGRAM(S): 400 CAPSULE ORAL at 05:26

## 2021-02-12 RX ADMIN — GABAPENTIN 100 MILLIGRAM(S): 400 CAPSULE ORAL at 13:17

## 2021-02-12 RX ADMIN — MEROPENEM-VABORBACTAM 83.33 GRAM(S): 1; 1 INJECTION, POWDER, FOR SOLUTION INTRAVENOUS at 01:56

## 2021-02-12 RX ADMIN — POLYETHYLENE GLYCOL 3350 17 GRAM(S): 17 POWDER, FOR SOLUTION ORAL at 13:17

## 2021-02-12 NOTE — PROGRESS NOTE ADULT - ATTENDING COMMENTS
80 y.o. M w/ a hx of cirrhosis c/b EV s/p banding, HTN currently hospitalized for MDRO E.coli,  Klebsiella, Strep anginosus bacteremia 2/2 cholangitis currently on Vabomere now s/p ERCP w/ stenting.   Patient feels well today, RUQ pain improving.     # MDRO polymicrobial bacteremia 2/2 acute cholangitis: S/p ERCP & stent w/ GI. Cont. Vabomere. ID, GI following. Repeat Bcx pending for clearance of Strep. TTE pending.    # Cirrhosis: Cont Lactulose, Propanolol. Hbg stable.     I have personally seen, examined and participated in the care of this patient. I have reviewed all pertinent clinical information, including history, physical exam, plan and the resident's note and agree except as noted.

## 2021-02-12 NOTE — PROGRESS NOTE ADULT - PROBLEM SELECTOR PLAN 2
MDRO polymicrobial bacteremia; growing E.coli and Klebsiella with repeat blood cx growing streptococcus anginosus  - s/p avycaz (2/7 - 2/9)  - now on vabomere (2/9 - 2/19)  - TTE ordered   - ID following, [facility]

## 2021-02-12 NOTE — PROGRESS NOTE ADULT - ASSESSMENT
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 Now S/p ERCP with stent placed.

## 2021-02-12 NOTE — PROGRESS NOTE ADULT - PROBLEM SELECTOR PLAN 3
Pt presented with leukocytosis to WBC 13.49k, subjective fevers/chills/weakness, and +UA, likely 2/2 UTI  - s/p CTX 1g x1 and Zosyn x1 in ED  - Zosyn (2/6/21- 2/7/21 ); previous UCx in the past grew ESBL E.coli sensitive to Zosyn  - On vabomere now

## 2021-02-12 NOTE — PROGRESS NOTE ADULT - ASSESSMENT
80 year old male with cirrhosis (EtOH/ WALL), Dementia, CKD, prior right hip arthroplasty, previous ESBL E. coli UTIs presenting with abd pain and vomiting. CT A/P with mild biliary dilatation planned for MRCP. Also with RUQ tenderness on exam and elevated bili concerning for cholangitis/ cholecystitis. Blood culture positive for MDRO E. coli - [employer]     MRCP with acute gangrenous cholecystitis with large gallstone, also with cholecysto-choledochal fistula.    ERCP with Mirizzi's syndrome  Now s/p biliary stent placement 2/9  Repeat blood culture 2/9 with Strep constellatus    Recommend:  -Continue vabomere - MDRO Kleb sensitive to vabomere and will cover Strep constellatus  -F/U repeat blood cultures - would send two more in the AM  -Trend WBC - resolved  -Monitor for fevers  -Check TTE  -Dental evaluation for strep bacteremia    Mateus Taylor MD  Pager [phone]  After 5pm/weekends call [phone]

## 2021-02-12 NOTE — PROGRESS NOTE ADULT - SUBJECTIVE AND OBJECTIVE BOX
Chief Complaint:  Patient is a 80y old  Male who presents with a chief complaint of abdominal pain, emesis, dark stools (12 Feb 2021 07:35)      Interval Events:   No acute overnight events    Allergies:  No Known Allergies      Hospital Medications:  acetaminophen   Tablet .. 650 milliGRAM(s) Oral every 8 hours PRN  ascorbic acid 500 milliGRAM(s) Oral daily  bisacodyl Suppository 10 milliGRAM(s) Rectal daily PRN  calcium carbonate 1250 mG  + Vitamin D (OsCal 500 + D) 1 Tablet(s) Oral daily  dextrose 40% Gel 15 Gram(s) Oral once  dextrose 50% Injectable 25 Gram(s) IV Push once  dextrose 50% Injectable 12.5 Gram(s) IV Push once  dextrose 50% Injectable 25 Gram(s) IV Push once  gabapentin 100 milliGRAM(s) Oral three times a day  glucagon  Injectable 1 milliGRAM(s) IntraMuscular once  influenza   Vaccine 0.5 milliLiter(s) IntraMuscular once  lactulose Syrup 30 Gram(s) Oral two times a day  lidocaine   Patch 1 Patch Transdermal daily  meropenem/vaborbactam IVPB 4 Gram(s) IV Intermittent every 8 hours  multivitamin 1 Tablet(s) Oral daily  pantoprazole    Tablet 40 milliGRAM(s) Oral before breakfast  polyethylene glycol 3350 17 Gram(s) Oral daily  potassium phosphate IVPB 15 milliMole(s) IV Intermittent once  propranolol 10 milliGRAM(s) Oral two times a day  senna 2 Tablet(s) Oral at bedtime      PMHX/PSHX:  Melena    Hematochezia    Arteriovenous malformation (AVM)    Pancreatitis    Porcelain gallbladder    Liver cirrhosis    Guillain-Lynch syndrome    HTN (hypertension)    History of repair of hip fracture    H/O inguinal hernia repair        ROS:   General:  No fevers, chills or night sweats  ENT:  No sore throat or dysphagia  CV:  No pain or palpitations  Resp:  No dyspnea, cough or  wheezing  GI:  No pain, No nausea, No vomiting, No diarrhea, No rectal bleeding, No tarry stools,  Skin:  No rash or edema    PHYSICAL EXAM:   Vital Signs:  Vital Signs Last 24 Hrs  T(C): 35.9 (12 Feb 2021 05:23), Max: 36.6 (11 Feb 2021 22:00)  T(F): 96.7 (12 Feb 2021 05:23), Max: 97.8 (11 Feb 2021 22:00)  HR: 61 (12 Feb 2021 05:23) (57 - 61)  BP: 141/80 (12 Feb 2021 05:23) (119/90 - 141/80)  BP(mean): --  RR: 19 (12 Feb 2021 05:23) (18 - 19)  SpO2: 98% (12 Feb 2021 05:23) (96% - 100%)  Daily     Daily     GENERAL:  NAD, Appears stated age  HEENT:  NC/AT,  conjunctivae clear and pink  CHEST:  Normal Effort, Normal rate  HEART:  RRR, S1 + S2  ABDOMEN:  Soft, non-tender, non-distended, BS+  EXTEREMITIES:  no cyanosis  SKIN:  Warm & Dry.  NEURO:  Alert, oriented    LABS:                        11.4   8.79  )-----------( 185      ( 12 Feb 2021 08:54 )             35.8     Mean Cell Volume: 73.1 fL (02-12-21 @ 08:54)    02-12    138  |  106  |  15  ----------------------------<  88  4.1   |  22  |  0.97    Ca    8.7      12 Feb 2021 08:54  Phos  1.8     02-12  Mg     1.6     02-12    TPro  6.2  /  Alb  2.1<L>  /  TBili  5.9<H>  /  DBili  x   /  AST  91<H>  /  ALT  37  /  AlkPhos  366<H>  02-12    LIVER FUNCTIONS - ( 12 Feb 2021 08:54 )  Alb: 2.1 g/dL / Pro: 6.2 g/dL / ALK PHOS: 366 U/L / ALT: 37 U/L / AST: 91 U/L / GGT: x           PT/INR - ( 12 Feb 2021 08:54 )   PT: 18.2 sec;   INR: 1.62 ratio         PTT - ( 12 Feb 2021 08:54 )  PTT:33.7 sec                            11.4   8.79  )-----------( 185      ( 12 Feb 2021 08:54 )             35.8                         11.8   9.16  )-----------( 206      ( 11 Feb 2021 16:01 )             37.1                         11.7   10.33 )-----------( 156      ( 10 Feb 2021 06:34 )             36.5       Imaging:

## 2021-02-12 NOTE — PROGRESS NOTE ADULT - PROBLEM SELECTOR PLAN 4
Pt presented with RUQ pain, leukocytosis, subjective fevers/chills, elevated T.bili 4.7, direct 2.3 and indirect 2.4, Alk phos 516, , ALT 51. Pt has hx of porcelain gallbladder (but 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 in the past).  - CT A/P w/ IV con (2/6/21): Mild intrahepatic biliary ductal dilation and irregular gallbladder wall, new since 4/1/2020 with interval removal of pancreatic duct stent.  - Found to have cholangitis this admission, s/p biliary stent   - Monitor CMP, INR

## 2021-02-12 NOTE — PROGRESS NOTE ADULT - ASSESSMENT
IMPRESSION:   # Cholestatic Pattern of liver enzymes elevation secondary to Mirizzi's Syndrome with acute gangrenous cholecystitis s/p ERCP with stent placement  # Cholangitis s/p ERCP with stent placement complicated by MDR Ecoli bacteremia:  # Streptococcus Bacteremia  # Acute gangrenous cholecystitis  # EtOH cirrhosis: decompensated w/ variceal bleeding.  - MELD-Na 20 (2/12/21)  - varices: prior variceal bleeding, last EGD 4/2020 w/ small varices  - ascites: none  - SBP: no Hx  - HCC: neg CT 2/21  - HE: ? had some years ago, since then on lactulose once or twice daily for constipation    RECOMMENDATIONS:   - IV abx per ID   - Trend CMP, CBC, INR daily   - Continue on lactulose BID, titrate 3-5 BMs daily   - Supportive care per primary team    Meagan Pelletier MD  Gastroenterology Fellow  [phone]  Available on [employer] Teams  Please page on call fellow on weekends and after 5pm on weekdays: [phone]

## 2021-02-12 NOTE — PROGRESS NOTE ADULT - SUBJECTIVE AND OBJECTIVE BOX
PROGRESS NOTE:   Authored by Gorge Middleton MD  PGY-1, Internal Medicine  Pager [facility] [phone], [phone]     Patient is a 80y old  Male who presents with a chief complaint of abdominal pain, emesis, dark stools (11 Feb 2021 08:18)      SUBJECTIVE / OVERNIGHT EVENTS: No events overnight.    ADDITIONAL REVIEW OF SYSTEMS: Denies fevers, chills, n/v.    MEDICATIONS  (STANDING):  ascorbic acid 500 milliGRAM(s) Oral daily  calcium carbonate 1250 mG  + Vitamin D (OsCal 500 + D) 1 Tablet(s) Oral daily  dextrose 40% Gel 15 Gram(s) Oral once  dextrose 50% Injectable 25 Gram(s) IV Push once  dextrose 50% Injectable 12.5 Gram(s) IV Push once  dextrose 50% Injectable 25 Gram(s) IV Push once  gabapentin 100 milliGRAM(s) Oral three times a day  glucagon  Injectable 1 milliGRAM(s) IntraMuscular once  influenza   Vaccine 0.5 milliLiter(s) IntraMuscular once  lactulose Syrup 30 Gram(s) Oral two times a day  lidocaine   Patch 1 Patch Transdermal daily  meropenem/vaborbactam IVPB 4 Gram(s) IV Intermittent every 8 hours  multivitamin 1 Tablet(s) Oral daily  pantoprazole    Tablet 40 milliGRAM(s) Oral before breakfast  polyethylene glycol 3350 17 Gram(s) Oral daily  propranolol 10 milliGRAM(s) Oral two times a day  senna 2 Tablet(s) Oral at bedtime    MEDICATIONS  (PRN):  acetaminophen   Tablet .. 650 milliGRAM(s) Oral every 8 hours PRN Temp greater or equal to 38C (100.4F), Mild Pain (1 - 3), Moderate Pain (4 - 6)  bisacodyl Suppository 10 milliGRAM(s) Rectal daily PRN Constipation      CAPILLARY BLOOD GLUCOSE        I&O's Summary    11 Feb 2021 07:01  -  12 Feb 2021 07:00  --------------------------------------------------------  IN: 0 mL / OUT: 500 mL / NET: -500 mL        PHYSICAL EXAM:  Vital Signs Last 24 Hrs  T(C): 35.9 (12 Feb 2021 05:23), Max: 36.6 (11 Feb 2021 22:00)  T(F): 96.7 (12 Feb 2021 05:23), Max: 97.8 (11 Feb 2021 22:00)  HR: 61 (12 Feb 2021 05:23) (57 - 61)  BP: 141/80 (12 Feb 2021 05:23) (119/90 - 141/80)  BP(mean): --  RR: 19 (12 Feb 2021 05:23) (18 - 19)  SpO2: 98% (12 Feb 2021 05:23) (96% - 100%)    CONSTITUTIONAL: NAD, lying in bed comfortably  RESPIRATORY: Normal respiratory effort; CTABL  CARDIOVASCULAR: Regular rate and rhythm, normal S1 and S2, no murmur/rub/gallop  ABDOMEN: Soft, nondistended, nontender to palpation, normoactive bowel sounds, no rebound/guarding  MUSCLOSKELETAL: no joint swelling or tenderness to palpation, FROM all extremities  NEURO: AAOx2-3.   EXTREMITIES: no pedal edema    LABS:                        11.8   9.16  )-----------( 206      ( 11 Feb 2021 16:01 )             37.1     Culture - Blood (collected 09 Feb 2021 12:03)  Source: .Blood Blood-Venous  Gram Stain (11 Feb 2021 03:15):    Growth in anaerobic bottle: Gram Positive Cocci in Pairs and Chains  Preliminary Report (11 Feb 2021 23:04):    Growth in anaerobic bottle: Streptococcus constellatus    ***Blood Panel PCR results on this specimen are available    approximately 3 hours after the Gram stain result.***    Gram stain, PCR, and/or culture results may not always    correspond due to difference in methodologies.    ************************************************************    This PCR assay was performed by multiplex PCR. This    Assay tests for 66 bacterial and resistance gene targets.    Please refer to the [facility] "[employer] test directory    at [email or web address] for details.  Organism: Blood Culture PCR (11 Feb 2021 04:12)  Organism: Blood Culture PCR (11 Feb 2021 04:12)    Culture - Blood (collected 09 Feb 2021 12:03)  Source: .Blood Blood  Gram Stain (11 Feb 2021 13:54):    Growth in anaerobic bottle: Gram Positive Cocci in Pairs and Chains    Growth in aerobic bottle: Gram Positive Cocci in Pairs and Chains  Preliminary Report (11 Feb 2021 13:54):    Growth in anaerobic bottle: Gram Positive Cocci in Pairs and Chains    Growth in aerobic bottle: Gram Positive Cocci in Pairs and Chains

## 2021-02-12 NOTE — PROGRESS NOTE ADULT - SUBJECTIVE AND OBJECTIVE BOX
CC: Patient is a 80y old  Male who presents with a chief complaint of abdominal pain, emesis, dark stools (12 Feb 2021 10:40)    ID following for bacteremia    Interval History/ROS: Patient now with strep constellatus bacteremia. Leukocytosis resolved. Abd pain improving.    Rest of ROS negative.    Allergies  No Known Allergies    ANTIMICROBIALS:  meropenem/vaborbactam IVPB 4 every 8 hours    OTHER MEDS:  acetaminophen   Tablet .. 650 milliGRAM(s) Oral every 8 hours PRN  ascorbic acid 500 milliGRAM(s) Oral daily  bisacodyl Suppository 10 milliGRAM(s) Rectal daily PRN  calcium carbonate 1250 mG  + Vitamin D (OsCal 500 + D) 1 Tablet(s) Oral daily  dextrose 40% Gel 15 Gram(s) Oral once  dextrose 50% Injectable 25 Gram(s) IV Push once  dextrose 50% Injectable 12.5 Gram(s) IV Push once  dextrose 50% Injectable 25 Gram(s) IV Push once  gabapentin 100 milliGRAM(s) Oral three times a day  glucagon  Injectable 1 milliGRAM(s) IntraMuscular once  influenza   Vaccine 0.5 milliLiter(s) IntraMuscular once  lactulose Syrup 30 Gram(s) Oral two times a day  lidocaine   Patch 1 Patch Transdermal daily  multivitamin 1 Tablet(s) Oral daily  pantoprazole    Tablet 40 milliGRAM(s) Oral before breakfast  polyethylene glycol 3350 17 Gram(s) Oral daily  propranolol 10 milliGRAM(s) Oral two times a day  senna 2 Tablet(s) Oral at bedtime    PE:    Vital Signs Last 24 Hrs  T(C): 35.8 (12 Feb 2021 15:28), Max: 36.6 (11 Feb 2021 22:00)  T(F): 96.4 (12 Feb 2021 15:28), Max: 97.8 (11 Feb 2021 22:00)  HR: 57 (12 Feb 2021 15:28) (57 - 61)  BP: 141/71 (12 Feb 2021 15:28) (136/72 - 141/80)  BP(mean): --  RR: 18 (12 Feb 2021 15:28) (18 - 19)  SpO2: 96% (12 Feb 2021 15:28) (96% - 100%)    Gen: AOx3, NAD  CV: S1+S2 normal, no murmurs  Resp: Clear bilat, no resp distress  Abd: Soft, nontender, +BS  Ext: No LE edema, no wounds  : No Garcia  IV/Skin: No thrombophlebitis  Neuro: no focal deficits    LABS:                          11.4   8.79  )-----------( 185      ( 12 Feb 2021 08:54 )             35.8       02-12    138  |  106  |  15  ----------------------------<  88  4.1   |  22  |  0.97    Ca    8.7      12 Feb 2021 08:54  Phos  1.8     02-12  Mg     1.6     02-12    TPro  6.2  /  Alb  2.1<L>  /  TBili  5.9<H>  /  DBili  x   /  AST  91<H>  /  ALT  37  /  AlkPhos  366<H>  02-12          MICROBIOLOGY:  v  .Blood Blood  02-09-21   Growth in aerobic and anaerobic bottles: Streptococcus constellatus  See previous culture [id number]  --  Blood Culture PCR      .Blood Blood  02-06-21   Growth in aerobic bottle: Escherichia coli  See previous culture [id number]  --    Growth in aerobic bottle: Gram Negative Rods      .Blood Blood-Venous  02-06-21   Growth in aerobic bottle: Escherichia coli  Growth in anaerobic bottle: Klebsiella pneumoniae (Carbapenem Resistant)  ***Blood Panel PCR results on this specimen are available  approximately 3 hours after the Gram stain result.***  Gram stain, PCR,and/or culture results may not always  correspond due to difference in methodologies.  ************************************************************  This PCR assay was performed by multiplex PCR. This  Assay tests for 66 bacterial and resistance genetargets.  Please refer to the [employer] test directory  at [email or web address] for details.  --  Blood Culture PCR  Escherichia coli  Klepne MDRO      .Urine Clean Catch (Midstream)  02-06-21   >100,000 CFU/ml Escherichia coli ESBL  --  Escherichia coli ESBL    RADIOLOGY:    < from: MR MRCP w/wo IV Cont (02.08.21 @ 20:25) >  Acute gangrenous cholecystitis with large gallstone in the neck compressing the common duct resulting in moderate intrahepatic biliary ductal dilatation (Mirizzi syndrome). In addition, gallbladder lumen appears to communicate with the common hepatic duct, suspicious for a cholecysto-choledochal fistula.    < end of copied text >

## 2021-02-13 LAB
ALBUMIN SERPL ELPH-MCNC: 2.2 G/DL — LOW (ref 3.3–5)
ALP SERPL-CCNC: 387 U/L — HIGH (ref 40–120)
ALT FLD-CCNC: 29 U/L — SIGNIFICANT CHANGE UP (ref 4–41)
ANION GAP SERPL CALC-SCNC: 10 MMOL/L — SIGNIFICANT CHANGE UP (ref 7–14)
APTT BLD: 33.9 SEC — SIGNIFICANT CHANGE UP (ref 27–36.3)
AST SERPL-CCNC: 69 U/L — HIGH (ref 4–40)
BILIRUB SERPL-MCNC: 5.6 MG/DL — HIGH (ref 0.2–1.2)
BUN SERPL-MCNC: 16 MG/DL — SIGNIFICANT CHANGE UP (ref 7–23)
CALCIUM SERPL-MCNC: 8.7 MG/DL — SIGNIFICANT CHANGE UP (ref 8.4–10.5)
CHLORIDE SERPL-SCNC: 104 MMOL/L — SIGNIFICANT CHANGE UP (ref 98–107)
CO2 SERPL-SCNC: 23 MMOL/L — SIGNIFICANT CHANGE UP (ref 22–31)
CREAT SERPL-MCNC: 1.07 MG/DL — SIGNIFICANT CHANGE UP (ref 0.5–1.3)
CULTURE RESULTS: SIGNIFICANT CHANGE UP
CULTURE RESULTS: SIGNIFICANT CHANGE UP
GLUCOSE SERPL-MCNC: 79 MG/DL — SIGNIFICANT CHANGE UP (ref 70–99)
HCT VFR BLD CALC: 36.2 % — LOW (ref 39–50)
HGB BLD-MCNC: 11.3 G/DL — LOW (ref 13–17)
INR BLD: 1.67 RATIO — HIGH (ref 0.88–1.16)
MAGNESIUM SERPL-MCNC: 1.6 MG/DL — SIGNIFICANT CHANGE UP (ref 1.6–2.6)
MCHC RBC-ENTMCNC: 23.1 PG — LOW (ref 27–34)
MCHC RBC-ENTMCNC: 31.2 GM/DL — LOW (ref 32–36)
MCV RBC AUTO: 74 FL — LOW (ref 80–100)
NRBC # BLD: 0 /100 WBCS — SIGNIFICANT CHANGE UP
NRBC # FLD: 0 K/UL — SIGNIFICANT CHANGE UP
PHOSPHATE SERPL-MCNC: 2.1 MG/DL — LOW (ref 2.5–4.5)
PLATELET # BLD AUTO: 209 K/UL — SIGNIFICANT CHANGE UP (ref 150–400)
POTASSIUM SERPL-MCNC: 4.5 MMOL/L — SIGNIFICANT CHANGE UP (ref 3.5–5.3)
POTASSIUM SERPL-SCNC: 4.5 MMOL/L — SIGNIFICANT CHANGE UP (ref 3.5–5.3)
PROT SERPL-MCNC: 6.2 G/DL — SIGNIFICANT CHANGE UP (ref 6–8.3)
PROTHROM AB SERPL-ACNC: 18.6 SEC — HIGH (ref 10.6–13.6)
RBC # BLD: 4.89 M/UL — SIGNIFICANT CHANGE UP (ref 4.2–5.8)
RBC # FLD: 21.5 % — HIGH (ref 10.3–14.5)
SODIUM SERPL-SCNC: 137 MMOL/L — SIGNIFICANT CHANGE UP (ref 135–145)
SPECIMEN SOURCE: SIGNIFICANT CHANGE UP
SPECIMEN SOURCE: SIGNIFICANT CHANGE UP
WBC # BLD: 7.46 K/UL — SIGNIFICANT CHANGE UP (ref 3.8–10.5)
WBC # FLD AUTO: 7.46 K/UL — SIGNIFICANT CHANGE UP (ref 3.8–10.5)

## 2021-02-13 PROCEDURE — 99232 SBSQ HOSP IP/OBS MODERATE 35: CPT

## 2021-02-13 PROCEDURE — 99233 SBSQ HOSP IP/OBS HIGH 50: CPT | Mod: GC

## 2021-02-13 PROCEDURE — 93306 TTE W/DOPPLER COMPLETE: CPT | Mod: 26

## 2021-02-13 RX ORDER — SODIUM,POTASSIUM PHOSPHATES 278-250MG
1 POWDER IN PACKET (EA) ORAL
Refills: 0 | Status: COMPLETED | OUTPATIENT
Start: 2021-02-13 | End: 2021-02-14

## 2021-02-13 RX ADMIN — Medication 1 PACKET(S): at 18:54

## 2021-02-13 RX ADMIN — Medication 1 TABLET(S): at 11:00

## 2021-02-13 RX ADMIN — MEROPENEM-VABORBACTAM 83.33 GRAM(S): 1; 1 INJECTION, POWDER, FOR SOLUTION INTRAVENOUS at 18:54

## 2021-02-13 RX ADMIN — GABAPENTIN 100 MILLIGRAM(S): 400 CAPSULE ORAL at 07:03

## 2021-02-13 RX ADMIN — PANTOPRAZOLE SODIUM 40 MILLIGRAM(S): 20 TABLET, DELAYED RELEASE ORAL at 07:02

## 2021-02-13 RX ADMIN — MEROPENEM-VABORBACTAM 83.33 GRAM(S): 1; 1 INJECTION, POWDER, FOR SOLUTION INTRAVENOUS at 10:40

## 2021-02-13 RX ADMIN — LACTULOSE 30 GRAM(S): 10 SOLUTION ORAL at 18:53

## 2021-02-13 RX ADMIN — GABAPENTIN 100 MILLIGRAM(S): 400 CAPSULE ORAL at 13:03

## 2021-02-13 RX ADMIN — Medication 1 PACKET(S): at 22:41

## 2021-02-13 RX ADMIN — Medication 500 MILLIGRAM(S): at 10:59

## 2021-02-13 RX ADMIN — POLYETHYLENE GLYCOL 3350 17 GRAM(S): 17 POWDER, FOR SOLUTION ORAL at 10:58

## 2021-02-13 RX ADMIN — LIDOCAINE 1 PATCH: 4 CREAM TOPICAL at 00:37

## 2021-02-13 RX ADMIN — LIDOCAINE 1 PATCH: 4 CREAM TOPICAL at 17:40

## 2021-02-13 RX ADMIN — LIDOCAINE 1 PATCH: 4 CREAM TOPICAL at 22:37

## 2021-02-13 RX ADMIN — GABAPENTIN 100 MILLIGRAM(S): 400 CAPSULE ORAL at 22:41

## 2021-02-13 RX ADMIN — LACTULOSE 30 GRAM(S): 10 SOLUTION ORAL at 07:03

## 2021-02-13 RX ADMIN — Medication 1 TABLET(S): at 10:59

## 2021-02-13 RX ADMIN — MEROPENEM-VABORBACTAM 83.33 GRAM(S): 1; 1 INJECTION, POWDER, FOR SOLUTION INTRAVENOUS at 01:21

## 2021-02-13 RX ADMIN — LIDOCAINE 1 PATCH: 4 CREAM TOPICAL at 10:58

## 2021-02-13 NOTE — PROGRESS NOTE ADULT - SUBJECTIVE AND OBJECTIVE BOX
CC: F/U for Bacteremia    Saw/spoke to patient. Patient arousable, comfortable. No new complaints.    Allergies  No Known Allergies    ANTIMICROBIALS:  meropenem/vaborbactam IVPB 4 every 8 hours    PE:    Vital Signs Last 24 Hrs  T(C): 36.7 (13 Feb 2021 07:00), Max: 36.7 (13 Feb 2021 07:00)  T(F): 98.1 (13 Feb 2021 07:00), Max: 98.1 (13 Feb 2021 07:00)  HR: 55 (13 Feb 2021 07:00) (55 - 60)  BP: 140/61 (13 Feb 2021 07:00) (132/66 - 141/71)  RR: 18 (13 Feb 2021 07:00) (18 - 18)  SpO2: 98% (13 Feb 2021 07:00) (96% - 98%)    Gen: AOx3, NAD, non-toxic, pleasant  CV: S1+S2 normal, nontachycardic  Resp: Clear bilat, no resp distress, no crackles/wheezes  Abd: Soft, nontender, +BS  Ext: No LE edema, no wounds    LABS:                        11.3   7.46  )-----------( 209      ( 13 Feb 2021 08:20 )             36.2     02-13    137  |  104  |  16  ----------------------------<  79  4.5   |  23  |  1.07    Ca    8.7      13 Feb 2021 08:20  Phos  2.1     02-13  Mg     1.6     02-13    TPro  6.2  /  Alb  2.2<L>  /  TBili  5.6<H>  /  DBili  x   /  AST  69<H>  /  ALT  29  /  AlkPhos  387<H>  02-13    MICROBIOLOGY:    .Blood Blood-Venous  02-11-21   No growth to date.    .Blood Blood  02-09-21   Growth in aerobic and anaerobic bottles: Streptococcus constellatus  See previous culture [id number]  --    Growth in anaerobic bottle: Gram Positive Cocci in Pairs and Chains  Growth in aerobic bottle: Gram Positive Cocci in Pairs and Chains    .Blood Blood-Venous  02-09-21   Growth in anaerobic bottle: Streptococcus constellatus  ***Blood Panel PCR results on this specimen are available  approximately 3 hours after the Gram stain result.***  Gram stain, PCR, and/or culture results may not always  correspond due to difference in methodologies.  ************************************************************  This PCR assay was performed by multiplex PCR. This  Assay tests for 66 bacterial and resistance gene targets.  Please refer to the [employer] test directory  at [email or web address] for details.  --  Blood Culture PCR  Streptococcus constellatus    .Blood Blood  02-06-21   Growth in aerobic bottle: Escherichia coli  See previous culture [id number]  --    Growth in aerobic bottle: Gram Negative Rods    .Blood Blood-Venous  02-06-21   Growth in aerobic bottle: Escherichia coli  Growth in anaerobic bottle: Klebsiella pneumoniae (Carbapenem Resistant)  ***Blood Panel PCR results on this specimen are available  approximately 3 hours after the Gram stain result.***  Gram stain, PCR,and/or culture results may not always  correspond due to difference in methodologies.  ************************************************************  This PCR assay was performed by multiplex PCR. This  Assay tests for 66 bacterial and resistance genetargets.  Please refer to the [employer] test directory  at [email or web address] for details.  --  Blood Culture PCR  Escherichia coli  Klepne MDRO    .Urine Clean Catch (Midstream)  02-06-21   >100,000 CFU/ml Escherichia coli ESBL  --  Escherichia coli ESBL    (otherwise reviewed)    RADIOLOGY:    2/8 MR:    IMPRESSION:  Acute gangrenous cholecystitis with large gallstone in the neck compressing the common duct resulting in moderate intrahepatic biliary ductal dilatation (Mirizzi syndrome). In addition, gallbladder lumen appears to communicate with the common hepatic duct, suspicious for a cholecysto-choledochal fistula.

## 2021-02-13 NOTE — PROGRESS NOTE ADULT - SUBJECTIVE AND OBJECTIVE BOX
PROGRESS NOTE:     CONTACT INFO:  Araseli Hsu MD  Internal Medicine PGY2  Pager: [phone]    Patient is a 80y old  Male who presents with a chief complaint of abdominal pain, emesis, dark stools (12 Feb 2021 17:01)      SUBJECTIVE / OVERNIGHT EVENTS:  No acute events overnight.    ADDITIONAL REVIEW OF SYSTEMS:    MEDICATIONS  (STANDING):  ascorbic acid 500 milliGRAM(s) Oral daily  calcium carbonate 1250 mG  + Vitamin D (OsCal 500 + D) 1 Tablet(s) Oral daily  dextrose 40% Gel 15 Gram(s) Oral once  dextrose 50% Injectable 25 Gram(s) IV Push once  dextrose 50% Injectable 12.5 Gram(s) IV Push once  dextrose 50% Injectable 25 Gram(s) IV Push once  gabapentin 100 milliGRAM(s) Oral three times a day  glucagon  Injectable 1 milliGRAM(s) IntraMuscular once  influenza   Vaccine 0.5 milliLiter(s) IntraMuscular once  lactulose Syrup 30 Gram(s) Oral two times a day  lidocaine   Patch 1 Patch Transdermal daily  meropenem/vaborbactam IVPB 4 Gram(s) IV Intermittent every 8 hours  multivitamin 1 Tablet(s) Oral daily  pantoprazole    Tablet 40 milliGRAM(s) Oral before breakfast  polyethylene glycol 3350 17 Gram(s) Oral daily  propranolol 10 milliGRAM(s) Oral two times a day  senna 2 Tablet(s) Oral at bedtime    MEDICATIONS  (PRN):  acetaminophen   Tablet .. 650 milliGRAM(s) Oral every 8 hours PRN Temp greater or equal to 38C (100.4F), Mild Pain (1 - 3), Moderate Pain (4 - 6)  bisacodyl Suppository 10 milliGRAM(s) Rectal daily PRN Constipation      CAPILLARY BLOOD GLUCOSE        I&O's Summary    12 Feb 2021 07:01  -  13 Feb 2021 07:00  --------------------------------------------------------  IN: 0 mL / OUT: 350 mL / NET: -350 mL        PHYSICAL EXAM:  Vital Signs Last 24 Hrs  T(C): 36.7 (13 Feb 2021 07:00), Max: 36.7 (13 Feb 2021 07:00)  T(F): 98.1 (13 Feb 2021 07:00), Max: 98.1 (13 Feb 2021 07:00)  HR: 55 (13 Feb 2021 07:00) (55 - 60)  BP: 140/61 (13 Feb 2021 07:00) (132/66 - 141/71)  BP(mean): --  RR: 18 (13 Feb 2021 07:00) (18 - 18)  SpO2: 98% (13 Feb 2021 07:00) (96% - 98%)        LABS:                        11.4   8.79  )-----------( 185      ( 12 Feb 2021 08:54 )             35.8     02-12    138  |  106  |  15  ----------------------------<  88  4.1   |  22  |  0.97    Ca    8.7      12 Feb 2021 08:54  Phos  1.8     02-12  Mg     1.6     02-12    TPro  6.2  /  Alb  2.1<L>  /  TBili  5.9<H>  /  DBili  x   /  AST  91<H>  /  ALT  37  /  AlkPhos  366<H>  02-12    PT/INR - ( 12 Feb 2021 08:54 )   PT: 18.2 sec;   INR: 1.62 ratio         PTT - ( 12 Feb 2021 08:54 )  PTT:33.7 sec          Culture - Blood (collected 11 Feb 2021 23:14)  Source: .Blood Blood-Venous  Preliminary Report (13 Feb 2021 01:02):    No growth to date.        RADIOLOGY & ADDITIONAL TESTS:  Results Reviewed:   Imaging Personally Reviewed:  Electrocardiogram Personally Reviewed:    COORDINATION OF CARE:  Care Discussed with Consultants/Other Providers [Y/N]:  Prior or Outpatient Records Reviewed [Y/N]:   PROGRESS NOTE:     CONTACT INFO:  Araseli Hsu MD  Internal Medicine PGY2  Pager: [phone]    Patient is a 80y old  Male who presents with a chief complaint of abdominal pain, emesis, dark stools (12 Feb 2021 17:01)      SUBJECTIVE / OVERNIGHT EVENTS:  No acute events overnight. Assessed patient this AM, no chest pain/shortness of breath/abdominal pain.     ADDITIONAL REVIEW OF SYSTEMS:    MEDICATIONS  (STANDING):  ascorbic acid 500 milliGRAM(s) Oral daily  calcium carbonate 1250 mG  + Vitamin D (OsCal 500 + D) 1 Tablet(s) Oral daily  dextrose 40% Gel 15 Gram(s) Oral once  dextrose 50% Injectable 25 Gram(s) IV Push once  dextrose 50% Injectable 12.5 Gram(s) IV Push once  dextrose 50% Injectable 25 Gram(s) IV Push once  gabapentin 100 milliGRAM(s) Oral three times a day  glucagon  Injectable 1 milliGRAM(s) IntraMuscular once  influenza   Vaccine 0.5 milliLiter(s) IntraMuscular once  lactulose Syrup 30 Gram(s) Oral two times a day  lidocaine   Patch 1 Patch Transdermal daily  meropenem/vaborbactam IVPB 4 Gram(s) IV Intermittent every 8 hours  multivitamin 1 Tablet(s) Oral daily  pantoprazole    Tablet 40 milliGRAM(s) Oral before breakfast  polyethylene glycol 3350 17 Gram(s) Oral daily  propranolol 10 milliGRAM(s) Oral two times a day  senna 2 Tablet(s) Oral at bedtime    MEDICATIONS  (PRN):  acetaminophen   Tablet .. 650 milliGRAM(s) Oral every 8 hours PRN Temp greater or equal to 38C (100.4F), Mild Pain (1 - 3), Moderate Pain (4 - 6)  bisacodyl Suppository 10 milliGRAM(s) Rectal daily PRN Constipation      CAPILLARY BLOOD GLUCOSE        I&O's Summary    12 Feb 2021 07:01  -  13 Feb 2021 07:00  --------------------------------------------------------  IN: 0 mL / OUT: 350 mL / NET: -350 mL        PHYSICAL EXAM:  Vital Signs Last 24 Hrs  T(C): 36.7 (13 Feb 2021 07:00), Max: 36.7 (13 Feb 2021 07:00)  T(F): 98.1 (13 Feb 2021 07:00), Max: 98.1 (13 Feb 2021 07:00)  HR: 55 (13 Feb 2021 07:00) (55 - 60)  BP: 140/61 (13 Feb 2021 07:00) (132/66 - 141/71)  BP(mean): --  RR: 18 (13 Feb 2021 07:00) (18 - 18)  SpO2: 98% (13 Feb 2021 07:00) (96% - 98%)  GENERAL: NAD  HEAD:  Atraumatic, Normocephalic  HEENT: scleral anicteric.   CV: Regular rate and rhythm. No murmurs, rubs, or gallops  Respiratory: normal respiratory effort, speaking in complete sentences. Lungs clear to auscultation bilaterally, no wheezes/crackles.  ABDOMEN: Soft, Nontender, Nondistended;+ tenderness on palpation diffusely   EXTREMITIES: No lower extremity edema. Bilateral LE symmetric in size.  PSYCH: AAOx3.   NEURO: Symmetric facial expressions.   Skin: No rashes      LABS:             11.3   7.46  )-----------( 209      ( 13 Feb 2021 08:20 )             36.2     Hgb Trend: 11.3<--, 11.4<--, 11.8<--, 11.7<--, 11.3<--  02-13    137  |  104  |  16  ----------------------------<  79  4.5   |  23  |  1.07    Ca    8.7      13 Feb 2021 08:20  Phos  2.1     02-13  Mg     1.6     02-13    TPro  6.2  /  Alb  2.2<L>  /  TBili  5.6<H>  /  DBili  x   /  AST  69<H>  /  ALT  29  /  AlkPhos  387<H>  02-13    Creatinine Trend: 1.07<--, 0.97<--, 1.17<--, 1.26<--, 1.29<--, 1.38<--  PT/INR - ( 13 Feb 2021 08:20 )   PT: 18.6 sec;   INR: 1.67 ratio         PTT - ( 13 Feb 2021 08:20 )  PTT:33.9 sec                      Culture - Blood (collected 11 Feb 2021 23:14)  Source: .Blood Blood-Venous  Preliminary Report (13 Feb 2021 01:02):    No growth to date.        RADIOLOGY & ADDITIONAL TESTS:  Results Reviewed:   Imaging Personally Reviewed:  Electrocardiogram Personally Reviewed:    COORDINATION OF CARE:  Care Discussed with Consultants/Other Providers [Y/N]:  Prior or Outpatient Records Reviewed [Y/N]:

## 2021-02-13 NOTE — PROGRESS NOTE ADULT - ASSESSMENT
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 Now S/p ERCP with stent placed.

## 2021-02-13 NOTE — PROGRESS NOTE ADULT - ASSESSMENT
80 year old male with cirrhosis (EtOH/ WALL), Dementia, CKD, prior right hip arthroplasty, previous ESBL E. coli UTIs presenting with abd pain and vomiting. CT A/P with mild biliary dilatation planned for MRCP. Also with RUQ tenderness on exam and elevated bili concerning for cholangitis/ cholecystitis  Blood culture positive for MDRO E. coli - [facility]  MRCP with acute gangrenous cholecystitis with large gallstone, also with cholecysto-choledochal fistula.  ERCP with Mirizzi's syndrome  Now s/p biliary stent placement 2/9  Repeat blood culture 2/9 with Strep constellatus  Overall, Polymicrobial bacteremia, cholecystitis, strep, MDRO infection    Recommend:  -Continue Vabomere (monitor CrCl)  -F/U pending BCXs  -Monitor for signs breakthrough infection  -Check TTE  -Dental evaluation for strep bacteremia  - Any role source control for cholecystitis per GI/surgery team    Abraham Hernandez MD  Pager [phone]  After 5pm and on weekends call [phone] 80 year old male with cirrhosis (EtOH/ WALL), Dementia, CKD, prior right hip arthroplasty, previous ESBL E. coli UTIs presenting with abd pain and vomiting. CT A/P with mild biliary dilatation planned for MRCP. Also with RUQ tenderness on exam and elevated bili concerning for cholangitis/ cholecystitis  Blood culture positive for MDRO E. coli - [facility]  MRCP with acute gangrenous cholecystitis with large gallstone, also with cholecysto-choledochal fistula.  ERCP with Mirizzi's syndrome  Now s/p biliary stent placement 2/9  Repeat blood culture 2/9 with Strep constellatus  Overall, Polymicrobial bacteremia, cholecystitis, strep, MDRO infection    Recommend:  -Continue Vabomere (monitor CrCl)  -F/U pending BCXs  -Monitor for signs breakthrough infection  -Check TTE  -Dental evaluation for strep bacteremia  - Any role procedure for source control for cholecystitis per GI/surgery team    Abraham Hernandez MD  Pager [phone]  After 5pm and on weekends call [phone]

## 2021-02-13 NOTE — PROGRESS NOTE ADULT - SUBJECTIVE AND OBJECTIVE BOX
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 Spoke to wife Shannon (retired pathologist) for collateral information. Patient is AAOx2 (oriented to self and place) and able to answer questions, but as per wife, he tends to be confused at baseline, particularly with memory issues, but no overt change in mental status recently. As per ED documentation by RN, pt was confused and calling for wife at times. Pt reports having abdominal pain (diffuse but mostly in RUQ), weakness, dizziness, feeling subjective fevers and chills. Also notes body aches including pain/"soreness" in lateral side of b/l ribs and in b/l legs. He attributes this to his son-in-law holding him up when transporting him here. Additionally reports worsening SOB, occasional cough with mucous, and loss of taste. Had 1st dose of Moderna COVID-19 vaccine on 1/27/21. Denies sick contacts at home.     In the ED: Tmax 100.1F rectally, HR 88-90, SBP 130s-150s, RR 16-18, sating % on RA. Labs notable for WBC 13.49, K 5.3 (severely hemolyzed), T. bili 4.7 (direct 2.3, indirect 2.4), Alk Phos 516, , ALT 51, lactate 3.2. Of note, WNL: Ammonia 46 and Hgb 13.8. UA revealed many bacteria, +WBC, +LE, -nitrite. Given CTX 1g x1, Zosyn x1, lactulose 10g x1, and 1L NS bolus. Admitted to Medicine for further management. 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    HTN (hypertension)    History of repair of hip fracture    H/O inguinal hernia repair        MEDICATIONS  (STANDING):  ascorbic acid 500 milliGRAM(s) Oral daily  calcium carbonate 1250 mG  + Vitamin D (OsCal 500 + D) 1 Tablet(s) Oral daily  dextrose 40% Gel 15 Gram(s) Oral once  dextrose 50% Injectable 25 Gram(s) IV Push once  dextrose 50% Injectable 12.5 Gram(s) IV Push once  dextrose 50% Injectable 25 Gram(s) IV Push once  gabapentin 100 milliGRAM(s) Oral three times a day  glucagon  Injectable 1 milliGRAM(s) IntraMuscular once  influenza   Vaccine 0.5 milliLiter(s) IntraMuscular once  lactulose Syrup 30 Gram(s) Oral two times a day  lidocaine   Patch 1 Patch Transdermal daily  meropenem/vaborbactam IVPB 4 Gram(s) IV Intermittent every 8 hours  multivitamin 1 Tablet(s) Oral daily  pantoprazole    Tablet 40 milliGRAM(s) Oral before breakfast  polyethylene glycol 3350 17 Gram(s) Oral daily  potassium phosphate / sodium phosphate Powder (PHOS-NaK) 1 Packet(s) Oral four times a day  propranolol 10 milliGRAM(s) Oral two times a day  senna 2 Tablet(s) Oral at bedtime    MEDICATIONS  (PRN):  acetaminophen   Tablet .. 650 milliGRAM(s) Oral every 8 hours PRN Temp greater or equal to 38C (100.4F), Mild Pain (1 - 3), Moderate Pain (4 - 6)  bisacodyl Suppository 10 milliGRAM(s) Rectal daily PRN Constipation      Allergies    No Known Allergies    Intolerances        FAMILY HISTORY:  Family history of ovarian cancer (Sibling)        *SOCIAL HISTORY: (guardian or who pt came with), (smoking hx)    *Last Dental Visit:    Vital Signs Last 24 Hrs  T(C): 36.9 (13 Feb 2021 15:00), Max: 36.9 (13 Feb 2021 15:00)  T(F): 98.5 (13 Feb 2021 15:00), Max: 98.5 (13 Feb 2021 15:00)  HR: 57 (13 Feb 2021 15:00) (55 - 60)  BP: 135/60 (13 Feb 2021 15:00) (132/66 - 140/61)  BP(mean): --  RR: 16 (13 Feb 2021 15:00) (16 - 18)  SpO2: 99% (13 Feb 2021 15:00) (96% - 99%)    EOE:  TMJ ( -  ) clicks                    ( -   ) pops                    (  -  ) crepitus             Mandible FROM             Facial bones and MOM grossly intact             ( -  ) trismus             ( -  ) LAD             ( -  ) swelling             ( -  ) asymmetry             ( -  ) palpation             ( -  ) SOB             ( -  ) dysphagia             (  - ) LOC    IOE:  permanent dentition: multiple missing teeth. Generalized attrition of remaining maxillary dentition.  #31-retained roots. No signs of active infection (swelling, fistula, pain). Retained roots of #31 are recommended for extraction, but there are no clinical signs/symptoms to implicate the tooth a source of any odontogenic infection.           hard/soft palate:  WNL           tongue/FOM WNL           labial/buccal mucosa WNL           ( -  ) percussion           ( -  ) palpation           ( -  ) swelling     LABS:                        11.3   7.46  )-----------( 209      ( 13 Feb 2021 08:20 )             36.2     02-13    137  |  104  |  16  ----------------------------<  79  4.5   |  23  |  1.07    Ca    8.7      13 Feb 2021 08:20  Phos  2.1     02-13  Mg     1.6     02-13    TPro  6.2  /  Alb  2.2<L>  /  TBili  5.6<H>  /  DBili  x   /  AST  69<H>  /  ALT  29  /  AlkPhos  387<H>  02-13    WBC Count: 7.46 K/uL [3.80 - 10.50] (02-13 @ 08:20)  Platelet Count - Automated: 209 K/uL [150 - 400] (02-13 @ 08:20)  INR: 1.67 ratio <H> [0.88 - 1.16] (02-13 @ 08:20)  Platelet Count - Automated: 185 K/uL [150 - 400] (02-12 @ 08:54)  WBC Count: 8.79 K/uL [3.80 - 10.50] (02-12 @ 08:54)  INR: 1.62 ratio <H> [0.88 - 1.16] (02-12 @ 08:54)  Culture Results:   No growth to date. (02-11 @ 23:14)  WBC Count: 9.16 K/uL [3.80 - 10.50] (02-11 @ 16:01)  Platelet Count - Automated: 206 K/uL [150 - 400] (02-11 @ 16:01)        *DENTAL RADIOGRAPHS: N/A    PROCEDURE:  Verbal consent given. Bedside exam performed. No signs of active dental infection or lymphadenopathy.     RECOMMENDATIONS:  1) Dental F/U with inpatient dentist for comprehensive dental care.   2) If any difficulty swallowing/breathing, fever occur, page dental.     Rolly Morales, DMD #[id number]

## 2021-02-13 NOTE — PROGRESS NOTE ADULT - ATTENDING COMMENTS
Patient seen and examined by myself , case discussed  with resident ,agree with the above finding and plan  80 y.o. M w/ a hx of cirrhosis c/b EV s/p banding, HTN currently hospitalized for MDRO E.coli,  Klebsiella, Strep anginosus bacteremia 2/2 cholangitis currently on Vabomere now s/p ERCP w/ stenting.   no acute complain     # MDRO polymicrobial bacteremia 2/2 acute cholangitis: S/p ERCP & stent w/ GI. Cont. Vabomere. ID, GI following. Repeat Bcx pending for clearance of Strep. TTE pending.  ID f/u appreciated , recommend dental eval for strep bacteremia   # Cirrhosis: Cont Lactulose, Propanolol. Hbg stable.

## 2021-02-14 LAB
ALBUMIN SERPL ELPH-MCNC: 2.4 G/DL — LOW (ref 3.3–5)
ALP SERPL-CCNC: 429 U/L — HIGH (ref 40–120)
ALT FLD-CCNC: 30 U/L — SIGNIFICANT CHANGE UP (ref 4–41)
ANION GAP SERPL CALC-SCNC: 10 MMOL/L — SIGNIFICANT CHANGE UP (ref 7–14)
APTT BLD: 28.9 SEC — SIGNIFICANT CHANGE UP (ref 27–36.3)
AST SERPL-CCNC: 78 U/L — HIGH (ref 4–40)
BILIRUB SERPL-MCNC: 4.8 MG/DL — HIGH (ref 0.2–1.2)
BUN SERPL-MCNC: 14 MG/DL — SIGNIFICANT CHANGE UP (ref 7–23)
CALCIUM SERPL-MCNC: 8.7 MG/DL — SIGNIFICANT CHANGE UP (ref 8.4–10.5)
CHLORIDE SERPL-SCNC: 104 MMOL/L — SIGNIFICANT CHANGE UP (ref 98–107)
CO2 SERPL-SCNC: 24 MMOL/L — SIGNIFICANT CHANGE UP (ref 22–31)
CREAT SERPL-MCNC: 0.96 MG/DL — SIGNIFICANT CHANGE UP (ref 0.5–1.3)
GLUCOSE SERPL-MCNC: 84 MG/DL — SIGNIFICANT CHANGE UP (ref 70–99)
HCT VFR BLD CALC: 34.2 % — LOW (ref 39–50)
HGB BLD-MCNC: 11.2 G/DL — LOW (ref 13–17)
INR BLD: 1.63 RATIO — HIGH (ref 0.88–1.16)
MAGNESIUM SERPL-MCNC: 1.6 MG/DL — SIGNIFICANT CHANGE UP (ref 1.6–2.6)
MCHC RBC-ENTMCNC: 23.9 PG — LOW (ref 27–34)
MCHC RBC-ENTMCNC: 32.7 GM/DL — SIGNIFICANT CHANGE UP (ref 32–36)
MCV RBC AUTO: 73.1 FL — LOW (ref 80–100)
NRBC # BLD: 0 /100 WBCS — SIGNIFICANT CHANGE UP
NRBC # FLD: 0 K/UL — SIGNIFICANT CHANGE UP
PHOSPHATE SERPL-MCNC: 2.3 MG/DL — LOW (ref 2.5–4.5)
PLATELET # BLD AUTO: 210 K/UL — SIGNIFICANT CHANGE UP (ref 150–400)
POTASSIUM SERPL-MCNC: 4.6 MMOL/L — SIGNIFICANT CHANGE UP (ref 3.5–5.3)
POTASSIUM SERPL-SCNC: 4.6 MMOL/L — SIGNIFICANT CHANGE UP (ref 3.5–5.3)
PROT SERPL-MCNC: 6.3 G/DL — SIGNIFICANT CHANGE UP (ref 6–8.3)
PROTHROM AB SERPL-ACNC: 18.3 SEC — HIGH (ref 10.6–13.6)
RBC # BLD: 4.68 M/UL — SIGNIFICANT CHANGE UP (ref 4.2–5.8)
RBC # FLD: 21.5 % — HIGH (ref 10.3–14.5)
SODIUM SERPL-SCNC: 138 MMOL/L — SIGNIFICANT CHANGE UP (ref 135–145)
WBC # BLD: 7.78 K/UL — SIGNIFICANT CHANGE UP (ref 3.8–10.5)
WBC # FLD AUTO: 7.78 K/UL — SIGNIFICANT CHANGE UP (ref 3.8–10.5)

## 2021-02-14 PROCEDURE — 99232 SBSQ HOSP IP/OBS MODERATE 35: CPT | Mod: GC

## 2021-02-14 RX ORDER — SODIUM,POTASSIUM PHOSPHATES 278-250MG
1 POWDER IN PACKET (EA) ORAL ONCE
Refills: 0 | Status: DISCONTINUED | OUTPATIENT
Start: 2021-02-14 | End: 2021-02-14

## 2021-02-14 RX ADMIN — Medication 1 TABLET(S): at 10:46

## 2021-02-14 RX ADMIN — Medication 500 MILLIGRAM(S): at 10:46

## 2021-02-14 RX ADMIN — PANTOPRAZOLE SODIUM 40 MILLIGRAM(S): 20 TABLET, DELAYED RELEASE ORAL at 06:34

## 2021-02-14 RX ADMIN — LIDOCAINE 1 PATCH: 4 CREAM TOPICAL at 22:30

## 2021-02-14 RX ADMIN — LIDOCAINE 1 PATCH: 4 CREAM TOPICAL at 10:46

## 2021-02-14 RX ADMIN — LACTULOSE 30 GRAM(S): 10 SOLUTION ORAL at 18:23

## 2021-02-14 RX ADMIN — Medication 1 PACKET(S): at 10:46

## 2021-02-14 RX ADMIN — MEROPENEM-VABORBACTAM 83.33 GRAM(S): 1; 1 INJECTION, POWDER, FOR SOLUTION INTRAVENOUS at 10:47

## 2021-02-14 RX ADMIN — SENNA PLUS 2 TABLET(S): 8.6 TABLET ORAL at 20:52

## 2021-02-14 RX ADMIN — POLYETHYLENE GLYCOL 3350 17 GRAM(S): 17 POWDER, FOR SOLUTION ORAL at 10:46

## 2021-02-14 RX ADMIN — LIDOCAINE 1 PATCH: 4 CREAM TOPICAL at 17:15

## 2021-02-14 RX ADMIN — GABAPENTIN 100 MILLIGRAM(S): 400 CAPSULE ORAL at 13:01

## 2021-02-14 RX ADMIN — MEROPENEM-VABORBACTAM 83.33 GRAM(S): 1; 1 INJECTION, POWDER, FOR SOLUTION INTRAVENOUS at 01:36

## 2021-02-14 RX ADMIN — GABAPENTIN 100 MILLIGRAM(S): 400 CAPSULE ORAL at 06:34

## 2021-02-14 RX ADMIN — Medication 1 PACKET(S): at 06:34

## 2021-02-14 RX ADMIN — GABAPENTIN 100 MILLIGRAM(S): 400 CAPSULE ORAL at 20:52

## 2021-02-14 RX ADMIN — MEROPENEM-VABORBACTAM 83.33 GRAM(S): 1; 1 INJECTION, POWDER, FOR SOLUTION INTRAVENOUS at 18:23

## 2021-02-14 NOTE — PROGRESS NOTE ADULT - SUBJECTIVE AND OBJECTIVE BOX
PROGRESS NOTE:   Authored by Gorge Middleton MD  PGY-1, Internal Medicine  Pager [facility] [phone], J [id number]     Patient is a 80y old  Male who presents with a chief complaint of abdominal pain, emesis, dark stools (13 Feb 2021 16:16)      SUBJECTIVE / OVERNIGHT EVENTS: No events overnight.    ADDITIONAL REVIEW OF SYSTEMS: Denies fevers, chills, n/v.    MEDICATIONS  (STANDING):  ascorbic acid 500 milliGRAM(s) Oral daily  calcium carbonate 1250 mG  + Vitamin D (OsCal 500 + D) 1 Tablet(s) Oral daily  dextrose 40% Gel 15 Gram(s) Oral once  dextrose 50% Injectable 25 Gram(s) IV Push once  dextrose 50% Injectable 12.5 Gram(s) IV Push once  dextrose 50% Injectable 25 Gram(s) IV Push once  gabapentin 100 milliGRAM(s) Oral three times a day  glucagon  Injectable 1 milliGRAM(s) IntraMuscular once  influenza   Vaccine 0.5 milliLiter(s) IntraMuscular once  lactulose Syrup 30 Gram(s) Oral two times a day  lidocaine   Patch 1 Patch Transdermal daily  meropenem/vaborbactam IVPB 4 Gram(s) IV Intermittent every 8 hours  multivitamin 1 Tablet(s) Oral daily  pantoprazole    Tablet 40 milliGRAM(s) Oral before breakfast  polyethylene glycol 3350 17 Gram(s) Oral daily  potassium phosphate / sodium phosphate Powder (PHOS-NaK) 1 Packet(s) Oral four times a day  propranolol 10 milliGRAM(s) Oral two times a day  senna 2 Tablet(s) Oral at bedtime    MEDICATIONS  (PRN):  acetaminophen   Tablet .. 650 milliGRAM(s) Oral every 8 hours PRN Temp greater or equal to 38C (100.4F), Mild Pain (1 - 3), Moderate Pain (4 - 6)  bisacodyl Suppository 10 milliGRAM(s) Rectal daily PRN Constipation      CAPILLARY BLOOD GLUCOSE        I&O's Summary    13 Feb 2021 07:01  -  14 Feb 2021 07:00  --------------------------------------------------------  IN: 0 mL / OUT: 650 mL / NET: -650 mL        PHYSICAL EXAM:  Vital Signs Last 24 Hrs  T(C): 36.8 (14 Feb 2021 06:30), Max: 36.9 (13 Feb 2021 15:00)  T(F): 98.3 (14 Feb 2021 06:30), Max: 98.5 (13 Feb 2021 15:00)  HR: 55 (14 Feb 2021 06:30) (55 - 87)  BP: 134/60 (14 Feb 2021 06:30) (134/60 - 156/83)  BP(mean): --  RR: 18 (14 Feb 2021 06:30) (16 - 18)  SpO2: 99% (14 Feb 2021 06:30) (99% - 100%)    CONSTITUTIONAL: NAD, lying in bed comfortably  RESPIRATORY: Normal respiratory effort; CTABL  CARDIOVASCULAR: Regular rate and rhythm, normal S1 and S2, no murmur/rub/gallop  ABDOMEN: Soft, nondistended, nontender to palpation, normoactive bowel sounds, no rebound/guarding  MUSCLOSKELETAL: no joint swelling or tenderness to palpation, FROM all extremities  NEURO: AAOx3 to person, place, and time, full and equal strength all extremities   EXTREMITIES: no pedal edema    LABS:                        11.3   7.46  )-----------( 209      ( 13 Feb 2021 08:20 )             36.2     02-13    137  |  104  |  16  ----------------------------<  79  4.5   |  23  |  1.07    Ca    8.7      13 Feb 2021 08:20  Phos  2.1     02-13  Mg     1.6     02-13    TPro  6.2  /  Alb  2.2<L>  /  TBili  5.6<H>  /  DBili  x   /  AST  69<H>  /  ALT  29  /  AlkPhos  387<H>  02-13    PT/INR - ( 13 Feb 2021 08:20 )   PT: 18.6 sec;   INR: 1.67 ratio         PTT - ( 13 Feb 2021 08:20 )  PTT:33.9 sec          Culture - Blood (collected 11 Feb 2021 23:14)  Source: .Blood Blood-Venous  Preliminary Report (13 Feb 2021 01:02):    No growth to date.        RADIOLOGY & ADDITIONAL TESTS:     PROGRESS NOTE:   Authored by Gorge Middleton MD  PGY-1, Internal Medicine  Pager [facility] [phone], J [id number]     Patient is a 80y old  Male who presents with a chief complaint of abdominal pain, emesis, dark stools (13 Feb 2021 16:16)      SUBJECTIVE / OVERNIGHT EVENTS: No events overnight. Patient complaining of food that is difficult for him to chew and not palatable to his tastes.     ADDITIONAL REVIEW OF SYSTEMS: Denies fevers, chills, n/v.    MEDICATIONS  (STANDING):  ascorbic acid 500 milliGRAM(s) Oral daily  calcium carbonate 1250 mG  + Vitamin D (OsCal 500 + D) 1 Tablet(s) Oral daily  dextrose 40% Gel 15 Gram(s) Oral once  dextrose 50% Injectable 25 Gram(s) IV Push once  dextrose 50% Injectable 12.5 Gram(s) IV Push once  dextrose 50% Injectable 25 Gram(s) IV Push once  gabapentin 100 milliGRAM(s) Oral three times a day  glucagon  Injectable 1 milliGRAM(s) IntraMuscular once  influenza   Vaccine 0.5 milliLiter(s) IntraMuscular once  lactulose Syrup 30 Gram(s) Oral two times a day  lidocaine   Patch 1 Patch Transdermal daily  meropenem/vaborbactam IVPB 4 Gram(s) IV Intermittent every 8 hours  multivitamin 1 Tablet(s) Oral daily  pantoprazole    Tablet 40 milliGRAM(s) Oral before breakfast  polyethylene glycol 3350 17 Gram(s) Oral daily  potassium phosphate / sodium phosphate Powder (PHOS-NaK) 1 Packet(s) Oral four times a day  propranolol 10 milliGRAM(s) Oral two times a day  senna 2 Tablet(s) Oral at bedtime    MEDICATIONS  (PRN):  acetaminophen   Tablet .. 650 milliGRAM(s) Oral every 8 hours PRN Temp greater or equal to 38C (100.4F), Mild Pain (1 - 3), Moderate Pain (4 - 6)  bisacodyl Suppository 10 milliGRAM(s) Rectal daily PRN Constipation      CAPILLARY BLOOD GLUCOSE        I&O's Summary    13 Feb 2021 07:01  -  14 Feb 2021 07:00  --------------------------------------------------------  IN: 0 mL / OUT: 650 mL / NET: -650 mL        PHYSICAL EXAM:  Vital Signs Last 24 Hrs  T(C): 36.8 (14 Feb 2021 06:30), Max: 36.9 (13 Feb 2021 15:00)  T(F): 98.3 (14 Feb 2021 06:30), Max: 98.5 (13 Feb 2021 15:00)  HR: 55 (14 Feb 2021 06:30) (55 - 87)  BP: 134/60 (14 Feb 2021 06:30) (134/60 - 156/83)  BP(mean): --  RR: 18 (14 Feb 2021 06:30) (16 - 18)  SpO2: 99% (14 Feb 2021 06:30) (99% - 100%)    CONSTITUTIONAL: NAD, lying in bed comfortably  RESPIRATORY: Normal respiratory effort; CTABL  CARDIOVASCULAR: Regular rate and rhythm, normal S1 and S2, no murmur/rub/gallop  ABDOMEN: Soft, nondistended, Diffusely tender to palpation, normoactive bowel sounds.  MUSCLOSKELETAL: no joint swelling or tenderness to palpation, FROM all extremities  NEURO: AAOx3 to person, place, and time, full and equal strength all extremities   EXTREMITIES: no pedal edema    LABS:                        11.3   7.46  )-----------( 209      ( 13 Feb 2021 08:20 )             36.2     02-13    137  |  104  |  16  ----------------------------<  79  4.5   |  23  |  1.07    Ca    8.7      13 Feb 2021 08:20  Phos  2.1     02-13  Mg     1.6     02-13    TPro  6.2  /  Alb  2.2<L>  /  TBili  5.6<H>  /  DBili  x   /  AST  69<H>  /  ALT  29  /  AlkPhos  387<H>  02-13    PT/INR - ( 13 Feb 2021 08:20 )   PT: 18.6 sec;   INR: 1.67 ratio         PTT - ( 13 Feb 2021 08:20 )  PTT:33.9 sec          Culture - Blood (collected 11 Feb 2021 23:14)  Source: .Blood Blood-Venous  Preliminary Report (13 Feb 2021 01:02):    No growth to date.        RADIOLOGY & ADDITIONAL TESTS:

## 2021-02-14 NOTE — PROGRESS NOTE ADULT - ASSESSMENT
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 Now S/p ERCP with stent placed.

## 2021-02-14 NOTE — PROGRESS NOTE ADULT - ATTENDING COMMENTS
Patient seen and examined by myself , case discussed  with resident ,agree with the above finding and plan  80 y.o. M w/ a hx of cirrhosis c/b EV s/p banding, HTN currently hospitalized for MDRO E.coli,  Klebsiella, Strep anginosus bacteremia 2/2 cholangitis currently on Vabomere now s/p ERCP w/ stenting.   no acute complain     # MDRO polymicrobial bacteremia 2/2 acute cholangitis: S/p ERCP & stent w/ GI. Cont. Vabomere. ID, GI following. Repeat Bcx pending for clearance of Strep. TTE pending.  ID f/u appreciated , recommend dental eval for strep bacteremia   # Cirrhosis: Cont Lactulose, Propanolol. Hbg stable. Patient seen and examined by myself , case discussed  with resident ,agree with the above finding and plan  80 y.o. M w/ a hx of cirrhosis c/b EV s/p banding, HTN currently hospitalized for MDRO E.coli,  Klebsiella, Strep anginosus bacteremia 2/2 cholangitis currently on Vabomere now s/p ERCP w/ stenting.   no acute complain     # MDRO polymicrobial bacteremia 2/2 acute cholangitis: S/p ERCP & stent w/ GI. Cont. Vabomere. ID, GI following. Repeat Bcx pending for clearance of Strep. TTE pending.  ID f/u appreciated , recommend dental eval for strep bacteremia   # Cirrhosis: Cont Lactulose, Propanolol. Hbg stable.  # hypophosphatemia; will supplement , monitor BMP

## 2021-02-14 NOTE — PROGRESS NOTE ADULT - PROBLEM SELECTOR PLAN 2
MDRO polymicrobial bacteremia; growing E.coli and Klebsiella with repeat blood cx growing streptococcus anginosus  - s/p avycaz (2/7 - 2/9)  - now on vabomere (2/9 - 2/19)  - TTE ordered   - ID following, appreciate recs  - Dental consult for possible dental abscess as source of bacteremia. MDRO polymicrobial bacteremia; growing E.coli and Klebsiella with repeat blood cx growing streptococcus anginosus  - s/p avycaz (2/7 - 2/9)  - now on vabomere (2/9 - 2/19)  - TTE with normal LV function, no vegetations   - ID following, appreciate recs  - Dental evaluation, no source of infection on exam, outpatient dental follow up

## 2021-02-14 NOTE — PROGRESS NOTE ADULT - PROBLEM SELECTOR PLAN 7
Appears to be stable. 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 At admission, pt does not appear overloaded on exam.  - c/w home lactulose 30g BID  - c/w home propanolol 10mg BID (for varices)  - c/w home furosemide 20mg daily  - GI/Hepatology consulted by ED, f/u recs Appears to be stable. 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 At admission, pt does not appear overloaded on exam.  - c/w home lactulose 30g BID, holding because of diarrhea   - c/w home propanolol 10mg BID (for varices)  - c/w home furosemide 20mg daily  - GI/Hepatology consulted by ED, f/u recs

## 2021-02-15 LAB
ALBUMIN SERPL ELPH-MCNC: 2.2 G/DL — LOW (ref 3.3–5)
ALP SERPL-CCNC: 443 U/L — HIGH (ref 40–120)
ALT FLD-CCNC: 27 U/L — SIGNIFICANT CHANGE UP (ref 4–41)
ANION GAP SERPL CALC-SCNC: 8 MMOL/L — SIGNIFICANT CHANGE UP (ref 7–14)
AST SERPL-CCNC: 67 U/L — HIGH (ref 4–40)
BILIRUB SERPL-MCNC: 4.4 MG/DL — HIGH (ref 0.2–1.2)
BUN SERPL-MCNC: 14 MG/DL — SIGNIFICANT CHANGE UP (ref 7–23)
CALCIUM SERPL-MCNC: 8.8 MG/DL — SIGNIFICANT CHANGE UP (ref 8.4–10.5)
CHLORIDE SERPL-SCNC: 104 MMOL/L — SIGNIFICANT CHANGE UP (ref 98–107)
CO2 SERPL-SCNC: 26 MMOL/L — SIGNIFICANT CHANGE UP (ref 22–31)
CREAT SERPL-MCNC: 1.06 MG/DL — SIGNIFICANT CHANGE UP (ref 0.5–1.3)
GLUCOSE SERPL-MCNC: 83 MG/DL — SIGNIFICANT CHANGE UP (ref 70–99)
MAGNESIUM SERPL-MCNC: 1.6 MG/DL — SIGNIFICANT CHANGE UP (ref 1.6–2.6)
PHOSPHATE SERPL-MCNC: 2.9 MG/DL — SIGNIFICANT CHANGE UP (ref 2.5–4.5)
POTASSIUM SERPL-MCNC: 4.2 MMOL/L — SIGNIFICANT CHANGE UP (ref 3.5–5.3)
POTASSIUM SERPL-SCNC: 4.2 MMOL/L — SIGNIFICANT CHANGE UP (ref 3.5–5.3)
PROT SERPL-MCNC: 6.2 G/DL — SIGNIFICANT CHANGE UP (ref 6–8.3)
SODIUM SERPL-SCNC: 138 MMOL/L — SIGNIFICANT CHANGE UP (ref 135–145)

## 2021-02-15 PROCEDURE — 99232 SBSQ HOSP IP/OBS MODERATE 35: CPT | Mod: GC

## 2021-02-15 RX ADMIN — Medication 1 TABLET(S): at 11:27

## 2021-02-15 RX ADMIN — GABAPENTIN 100 MILLIGRAM(S): 400 CAPSULE ORAL at 22:38

## 2021-02-15 RX ADMIN — LIDOCAINE 1 PATCH: 4 CREAM TOPICAL at 19:06

## 2021-02-15 RX ADMIN — GABAPENTIN 100 MILLIGRAM(S): 400 CAPSULE ORAL at 13:49

## 2021-02-15 RX ADMIN — MEROPENEM-VABORBACTAM 83.33 GRAM(S): 1; 1 INJECTION, POWDER, FOR SOLUTION INTRAVENOUS at 01:26

## 2021-02-15 RX ADMIN — GABAPENTIN 100 MILLIGRAM(S): 400 CAPSULE ORAL at 04:15

## 2021-02-15 RX ADMIN — MEROPENEM-VABORBACTAM 83.33 GRAM(S): 1; 1 INJECTION, POWDER, FOR SOLUTION INTRAVENOUS at 11:26

## 2021-02-15 RX ADMIN — POLYETHYLENE GLYCOL 3350 17 GRAM(S): 17 POWDER, FOR SOLUTION ORAL at 11:28

## 2021-02-15 RX ADMIN — LACTULOSE 30 GRAM(S): 10 SOLUTION ORAL at 04:16

## 2021-02-15 RX ADMIN — MEROPENEM-VABORBACTAM 83.33 GRAM(S): 1; 1 INJECTION, POWDER, FOR SOLUTION INTRAVENOUS at 19:20

## 2021-02-15 RX ADMIN — Medication 500 MILLIGRAM(S): at 11:27

## 2021-02-15 RX ADMIN — PANTOPRAZOLE SODIUM 40 MILLIGRAM(S): 20 TABLET, DELAYED RELEASE ORAL at 07:06

## 2021-02-15 RX ADMIN — LIDOCAINE 1 PATCH: 4 CREAM TOPICAL at 11:26

## 2021-02-15 NOTE — PROGRESS NOTE ADULT - PROBLEM SELECTOR PLAN 7
Appears to be stable. 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 At admission, pt does not appear overloaded on exam.  - c/w home lactulose 30g BID, holding because of diarrhea   - c/w home propanolol 10mg BID (for varices)  - c/w home furosemide 20mg daily  - GI/Hepatology consulted by ED, f/u recs

## 2021-02-15 NOTE — PROGRESS NOTE ADULT - ASSESSMENT
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 Now S/p ERCP with stent placed.

## 2021-02-15 NOTE — PROGRESS NOTE ADULT - ATTENDING COMMENTS
Patient seen and examined by myself , case discussed  with resident ,agree with the above finding and plan  80 y.o. M w/ a hx of cirrhosis c/b EV s/p banding, HTN currently hospitalized for MDRO E.coli,  Klebsiella, Strep anginosus bacteremia 2/2 cholangitis currently on Vabomere now s/p ERCP w/ stenting.   no acute complain     # MDRO polymicrobial bacteremia 2/2 acute cholangitis: S/p ERCP & stent w/ GI. Cont. Vabomere. ID, GI following. Repeat Bcx for clearance of Strep.  NGTD 2/11, 2/13 ,TTE noted, no vegetations seen  ID f/u appreciated , recommend dental eval for strep bacteremia , s/p Dental evaluation, no source of infection on exam, outpatient dental follow up.   # Cirrhosis: Cont Lactulose, Propanolol. Hbg stable.  # hypophosphatemia; resolved with supplement , monitor BMP  PT eval noted, recommend rehab once medically cleared

## 2021-02-15 NOTE — PROGRESS NOTE ADULT - SUBJECTIVE AND OBJECTIVE BOX
PROGRESS NOTE:   Authored by Gorge Middleton MD  PGY-1, Internal Medicine  Pager [facility] [phone], J [id number]     Patient is a 80y old  Male who presents with a chief complaint of abdominal pain, emesis, dark stools (14 Feb 2021 07:30)      SUBJECTIVE / OVERNIGHT EVENTS: No events overnight.    ADDITIONAL REVIEW OF SYSTEMS: Denies fevers, chills, n/v.    MEDICATIONS  (STANDING):  ascorbic acid 500 milliGRAM(s) Oral daily  calcium carbonate 1250 mG  + Vitamin D (OsCal 500 + D) 1 Tablet(s) Oral daily  dextrose 40% Gel 15 Gram(s) Oral once  dextrose 50% Injectable 25 Gram(s) IV Push once  dextrose 50% Injectable 12.5 Gram(s) IV Push once  dextrose 50% Injectable 25 Gram(s) IV Push once  gabapentin 100 milliGRAM(s) Oral three times a day  glucagon  Injectable 1 milliGRAM(s) IntraMuscular once  influenza   Vaccine 0.5 milliLiter(s) IntraMuscular once  lactulose Syrup 30 Gram(s) Oral two times a day  lidocaine   Patch 1 Patch Transdermal daily  meropenem/vaborbactam IVPB 4 Gram(s) IV Intermittent every 8 hours  multivitamin 1 Tablet(s) Oral daily  pantoprazole    Tablet 40 milliGRAM(s) Oral before breakfast  polyethylene glycol 3350 17 Gram(s) Oral daily  propranolol 10 milliGRAM(s) Oral two times a day  senna 2 Tablet(s) Oral at bedtime    MEDICATIONS  (PRN):  acetaminophen   Tablet .. 650 milliGRAM(s) Oral every 8 hours PRN Temp greater or equal to 38C (100.4F), Mild Pain (1 - 3), Moderate Pain (4 - 6)  bisacodyl Suppository 10 milliGRAM(s) Rectal daily PRN Constipation      CAPILLARY BLOOD GLUCOSE        I&O's Summary      PHYSICAL EXAM:  Vital Signs Last 24 Hrs  T(C): 36.7 (14 Feb 2021 14:15), Max: 36.7 (14 Feb 2021 14:15)  T(F): 98.1 (14 Feb 2021 14:15), Max: 98.1 (14 Feb 2021 14:15)  HR: 77 (14 Feb 2021 18:21) (57 - 77)  BP: 114/60 (14 Feb 2021 18:21) (114/60 - 130/65)  BP(mean): --  RR: 16 (14 Feb 2021 14:15) (16 - 16)  SpO2: 99% (14 Feb 2021 14:15) (99% - 99%)    CONSTITUTIONAL: NAD, lying in bed comfortably  RESPIRATORY: Normal respiratory effort; CTABL  CARDIOVASCULAR: Regular rate and rhythm, normal S1 and S2, no murmur/rub/gallop  ABDOMEN: Soft, nondistended, nontender to palpation, normoactive bowel sounds, no rebound/guarding  MUSCLOSKELETAL: no joint swelling or tenderness to palpation, FROM all extremities  NEURO: AAOx2-3, no focal deficits   EXTREMITIES: no pedal edema    LABS:                        11.2   7.78  )-----------( 210      ( 14 Feb 2021 08:01 )             34.2     02-15    138  |  104  |  14  ----------------------------<  83  4.2   |  26  |  1.06    Ca    8.8      15 Feb 2021 06:42  Phos  2.9     02-15  Mg     1.6     02-15    TPro  6.2  /  Alb  2.2<L>  /  TBili  4.4<H>  /  DBili  x   /  AST  67<H>  /  ALT  27  /  AlkPhos  443<H>  02-15    PT/INR - ( 14 Feb 2021 08:01 )   PT: 18.3 sec;   INR: 1.63 ratio         PTT - ( 14 Feb 2021 08:01 )  PTT:28.9 sec          Culture - Blood (collected 13 Feb 2021 10:10)  Source: .Blood Blood-Venous  Preliminary Report (14 Feb 2021 11:02):    No growth to date.    Culture - Blood (collected 13 Feb 2021 10:10)  Source: .Blood Blood-Peripheral  Preliminary Report (14 Feb 2021 11:02):    No growth to date.        RADIOLOGY & ADDITIONAL TESTS:

## 2021-02-15 NOTE — PROGRESS NOTE ADULT - PROBLEM SELECTOR PLAN 2
MDRO polymicrobial bacteremia; growing E.coli and Klebsiella with repeat blood cx growing streptococcus anginosus  - s/p avycaz (2/7 - 2/9)  - now on vabomere (2/9 - 2/19)  - TTE with normal LV function, no vegetations   - ID following, appreciate recs  - Dental evaluation, no source of infection on exam, outpatient dental follow up

## 2021-02-16 LAB
ALBUMIN SERPL ELPH-MCNC: 2.3 G/DL — LOW (ref 3.3–5)
ALP SERPL-CCNC: 475 U/L — HIGH (ref 40–120)
ALT FLD-CCNC: 23 U/L — SIGNIFICANT CHANGE UP (ref 4–41)
ANION GAP SERPL CALC-SCNC: 9 MMOL/L — SIGNIFICANT CHANGE UP (ref 7–14)
AST SERPL-CCNC: 56 U/L — HIGH (ref 4–40)
BASOPHILS # BLD AUTO: 0.07 K/UL — SIGNIFICANT CHANGE UP (ref 0–0.2)
BASOPHILS NFR BLD AUTO: 1 % — SIGNIFICANT CHANGE UP (ref 0–2)
BILIRUB SERPL-MCNC: 3.9 MG/DL — HIGH (ref 0.2–1.2)
BUN SERPL-MCNC: 15 MG/DL — SIGNIFICANT CHANGE UP (ref 7–23)
CALCIUM SERPL-MCNC: 8.9 MG/DL — SIGNIFICANT CHANGE UP (ref 8.4–10.5)
CHLORIDE SERPL-SCNC: 103 MMOL/L — SIGNIFICANT CHANGE UP (ref 98–107)
CO2 SERPL-SCNC: 26 MMOL/L — SIGNIFICANT CHANGE UP (ref 22–31)
CREAT SERPL-MCNC: 0.98 MG/DL — SIGNIFICANT CHANGE UP (ref 0.5–1.3)
EOSINOPHIL # BLD AUTO: 0.43 K/UL — SIGNIFICANT CHANGE UP (ref 0–0.5)
EOSINOPHIL NFR BLD AUTO: 6 % — SIGNIFICANT CHANGE UP (ref 0–6)
GLUCOSE BLDC GLUCOMTR-MCNC: 125 MG/DL — HIGH (ref 70–99)
GLUCOSE BLDC GLUCOMTR-MCNC: 87 MG/DL — SIGNIFICANT CHANGE UP (ref 70–99)
GLUCOSE SERPL-MCNC: 96 MG/DL — SIGNIFICANT CHANGE UP (ref 70–99)
HCT VFR BLD CALC: 37.2 % — LOW (ref 39–50)
HGB BLD-MCNC: 11.5 G/DL — LOW (ref 13–17)
IANC: 3.98 K/UL — SIGNIFICANT CHANGE UP (ref 1.5–8.5)
LYMPHOCYTES # BLD AUTO: 1.42 K/UL — SIGNIFICANT CHANGE UP (ref 1–3.3)
LYMPHOCYTES # BLD AUTO: 20 % — SIGNIFICANT CHANGE UP (ref 13–44)
MAGNESIUM SERPL-MCNC: 1.7 MG/DL — SIGNIFICANT CHANGE UP (ref 1.6–2.6)
MCHC RBC-ENTMCNC: 24.1 PG — LOW (ref 27–34)
MCHC RBC-ENTMCNC: 30.9 GM/DL — LOW (ref 32–36)
MCV RBC AUTO: 77.8 FL — LOW (ref 80–100)
MONOCYTES # BLD AUTO: 0.78 K/UL — SIGNIFICANT CHANGE UP (ref 0–0.9)
MONOCYTES NFR BLD AUTO: 11 % — SIGNIFICANT CHANGE UP (ref 2–14)
NEUTROPHILS # BLD AUTO: 3.84 K/UL — SIGNIFICANT CHANGE UP (ref 1.8–7.4)
NEUTROPHILS NFR BLD AUTO: 52 % — SIGNIFICANT CHANGE UP (ref 43–77)
PHOSPHATE SERPL-MCNC: 2.4 MG/DL — LOW (ref 2.5–4.5)
PLATELET # BLD AUTO: 231 K/UL — SIGNIFICANT CHANGE UP (ref 150–400)
POTASSIUM SERPL-MCNC: 4.2 MMOL/L — SIGNIFICANT CHANGE UP (ref 3.5–5.3)
POTASSIUM SERPL-SCNC: 4.2 MMOL/L — SIGNIFICANT CHANGE UP (ref 3.5–5.3)
PROT SERPL-MCNC: 6.5 G/DL — SIGNIFICANT CHANGE UP (ref 6–8.3)
RBC # BLD: 4.78 M/UL — SIGNIFICANT CHANGE UP (ref 4.2–5.8)
RBC # FLD: 21.5 % — HIGH (ref 10.3–14.5)
SARS-COV-2 RNA SPEC QL NAA+PROBE: SIGNIFICANT CHANGE UP
SODIUM SERPL-SCNC: 138 MMOL/L — SIGNIFICANT CHANGE UP (ref 135–145)
WBC # BLD: 7.11 K/UL — SIGNIFICANT CHANGE UP (ref 3.8–10.5)
WBC # FLD AUTO: 7.11 K/UL — SIGNIFICANT CHANGE UP (ref 3.8–10.5)

## 2021-02-16 PROCEDURE — 99232 SBSQ HOSP IP/OBS MODERATE 35: CPT | Mod: GC

## 2021-02-16 PROCEDURE — 99232 SBSQ HOSP IP/OBS MODERATE 35: CPT

## 2021-02-16 RX ORDER — CEFTRIAXONE 500 MG/1
2000 INJECTION, POWDER, FOR SOLUTION INTRAMUSCULAR; INTRAVENOUS EVERY 24 HOURS
Refills: 0 | Status: DISCONTINUED | OUTPATIENT
Start: 2021-02-17 | End: 2021-02-18

## 2021-02-16 RX ORDER — FUROSEMIDE 40 MG
20 TABLET ORAL DAILY
Refills: 0 | Status: DISCONTINUED | OUTPATIENT
Start: 2021-02-16 | End: 2021-02-18

## 2021-02-16 RX ORDER — SODIUM,POTASSIUM PHOSPHATES 278-250MG
1 POWDER IN PACKET (EA) ORAL ONCE
Refills: 0 | Status: COMPLETED | OUTPATIENT
Start: 2021-02-16 | End: 2021-02-16

## 2021-02-16 RX ADMIN — PANTOPRAZOLE SODIUM 40 MILLIGRAM(S): 20 TABLET, DELAYED RELEASE ORAL at 07:03

## 2021-02-16 RX ADMIN — Medication 1 TABLET(S): at 11:12

## 2021-02-16 RX ADMIN — Medication 1 PACKET(S): at 10:36

## 2021-02-16 RX ADMIN — GABAPENTIN 100 MILLIGRAM(S): 400 CAPSULE ORAL at 14:13

## 2021-02-16 RX ADMIN — Medication 650 MILLIGRAM(S): at 17:37

## 2021-02-16 RX ADMIN — LACTULOSE 30 GRAM(S): 10 SOLUTION ORAL at 17:37

## 2021-02-16 RX ADMIN — MEROPENEM-VABORBACTAM 83.33 GRAM(S): 1; 1 INJECTION, POWDER, FOR SOLUTION INTRAVENOUS at 11:13

## 2021-02-16 RX ADMIN — GABAPENTIN 100 MILLIGRAM(S): 400 CAPSULE ORAL at 22:27

## 2021-02-16 RX ADMIN — MEROPENEM-VABORBACTAM 83.33 GRAM(S): 1; 1 INJECTION, POWDER, FOR SOLUTION INTRAVENOUS at 18:01

## 2021-02-16 RX ADMIN — MEROPENEM-VABORBACTAM 83.33 GRAM(S): 1; 1 INJECTION, POWDER, FOR SOLUTION INTRAVENOUS at 02:51

## 2021-02-16 RX ADMIN — Medication 500 MILLIGRAM(S): at 11:12

## 2021-02-16 RX ADMIN — LACTULOSE 30 GRAM(S): 10 SOLUTION ORAL at 07:03

## 2021-02-16 RX ADMIN — Medication 20 MILLIGRAM(S): at 14:13

## 2021-02-16 RX ADMIN — LIDOCAINE 1 PATCH: 4 CREAM TOPICAL at 11:13

## 2021-02-16 RX ADMIN — GABAPENTIN 100 MILLIGRAM(S): 400 CAPSULE ORAL at 07:03

## 2021-02-16 NOTE — PROGRESS NOTE ADULT - ASSESSMENT
80 year old male with cirrhosis (EtOH/ WALL), Dementia, CKD, prior right hip arthroplasty, previous ESBL E. coli UTIs presenting with abd pain and vomiting. CT A/P with mild biliary dilatation planned for MRCP. Also with RUQ tenderness on exam and elevated bili concerning for cholangitis/ cholecystitis  Blood culture positive for MDRO E. coli - [facility]  MRCP with acute gangrenous cholecystitis with large gallstone, also with cholecysto-choledochal fistula.  ERCP with Mirizzi's syndrome  Now s/p biliary stent placement 2/9  Repeat blood culture 2/9 with Strep constellatus  Overall, Polymicrobial bacteremia, cholecystitis, strep, MDRO infection    Recommend:  -Continue Vabomere to complete a 10 day course for MDRO bacteremia today, can switch to ceftriaxone 2 grams IV q 24 hours to complete a 14 day course on 2/24/2021.  -F/U pending BCXs so far negative  -Monitor for signs breakthrough infection    Mateus Taylor MD  Pager [phone]  After 5pm/weekends call [phone]

## 2021-02-16 NOTE — PROGRESS NOTE ADULT - PROBLEM SELECTOR PLAN 1
MDRO polymicrobial bacteremia; growing E.coli and Klebsiella with repeat blood cx growing streptococcus constellatus.   - s/p avycaz (2/7 - 2/9)  - now on vabomere (2/9 - 2/19)  - TTE with normal LV function, no vegetations   - Dental evaluation, no source of infection on exam, outpatient dental follow up  - Blood cxs from 2/11, 2/13 remain NGTD  - ID following, appreciate recs

## 2021-02-16 NOTE — DIETITIAN INITIAL EVALUATION ADULT. - OTHER INFO
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 Now S/p ERCP with stent placed.     Pt completing >75% of most meals. Would like to receive Soft diet as some upper teeth are missing. Denies swallowing difficulties or any nausea, vomiting. Lose BM 2/14. Reported weight 186# 3 weeks ago. Noted admit weight 172#.

## 2021-02-16 NOTE — PROGRESS NOTE ADULT - SUBJECTIVE AND OBJECTIVE BOX
CC: Patient is a 80y old  Male who presents with a chief complaint of abdominal pain, emesis, dark stools (16 Feb 2021 13:12)    ID following for bacteremia    Interval History/ROS: Patient with improvement in right sided abd pain. No fevers, no chills.    Rest of ROS negative.    Allergies  No Known Allergies    ANTIMICROBIALS:  meropenem/vaborbactam IVPB 4 every 8 hours    OTHER MEDS:  acetaminophen   Tablet .. 650 milliGRAM(s) Oral every 8 hours PRN  ascorbic acid 500 milliGRAM(s) Oral daily  bisacodyl Suppository 10 milliGRAM(s) Rectal daily PRN  calcium carbonate 1250 mG  + Vitamin D (OsCal 500 + D) 1 Tablet(s) Oral daily  dextrose 40% Gel 15 Gram(s) Oral once  dextrose 50% Injectable 25 Gram(s) IV Push once  dextrose 50% Injectable 12.5 Gram(s) IV Push once  dextrose 50% Injectable 25 Gram(s) IV Push once  furosemide    Tablet 20 milliGRAM(s) Oral daily  gabapentin 100 milliGRAM(s) Oral three times a day  glucagon  Injectable 1 milliGRAM(s) IntraMuscular once  influenza   Vaccine 0.5 milliLiter(s) IntraMuscular once  lactulose Syrup 30 Gram(s) Oral two times a day  lidocaine   Patch 1 Patch Transdermal daily  multivitamin 1 Tablet(s) Oral daily  pantoprazole    Tablet 40 milliGRAM(s) Oral before breakfast  polyethylene glycol 3350 17 Gram(s) Oral daily  propranolol 10 milliGRAM(s) Oral two times a day  senna 2 Tablet(s) Oral at bedtime    PE:    Vital Signs Last 24 Hrs  T(C): 36.6 (15 Feb 2021 22:23), Max: 36.6 (15 Feb 2021 22:23)  T(F): 97.9 (15 Feb 2021 22:23), Max: 97.9 (15 Feb 2021 22:23)  HR: 60 (16 Feb 2021 07:00) (60 - 63)  BP: 126/66 (16 Feb 2021 07:00) (126/66 - 143/84)  BP(mean): --  RR: 16 (16 Feb 2021 07:00) (16 - 17)  SpO2: 97% (16 Feb 2021 07:00) (97% - 100%)    Gen: AOx3, NAD  CV: S1+S2 normal, no murmurs  Resp: Clear bilat, no resp distress  Abd: Soft, nontender, +BS  Ext: No LE edema, no wounds  : No Garcia  IV/Skin: No thrombophlebitis  Neuro: no focal deficits    LABS:                          11.5   7.11  )-----------( 231      ( 16 Feb 2021 08:12 )             37.2       02-16    138  |  103  |  15  ----------------------------<  96  4.2   |  26  |  0.98    Ca    8.9      16 Feb 2021 08:25  Phos  2.4     02-16  Mg     1.7     02-16    TPro  6.5  /  Alb  2.3<L>  /  TBili  3.9<H>  /  DBili  x   /  AST  56<H>  /  ALT  23  /  AlkPhos  475<H>  02-16    MICROBIOLOGY:  v  .Blood Blood-Peripheral  02-13-21   No growth to date.  --  --      .Blood Blood-Venous  02-11-21   No growth to date.  --  --      .Blood Blood  02-09-21   Growth in aerobic and anaerobic bottles: Streptococcus constellatus  See previous culture [id number]  --  Blood Culture PCR  Streptococcus constellatus      .Blood Blood-Venous  02-06-21   Growth in aerobic bottle: Escherichia coli  Growth in anaerobic bottle: Klebsiella pneumoniae (Carbapenem Resistant)  ***Blood Panel PCR results on this specimen are available  approximately 3 hours after the Gram stain result.***  Gram stain, PCR,and/or culture results may not always  correspond due to difference in methodologies.  ************************************************************  This PCR assay was performed by multiplex PCR. This  Assay tests for 66 bacterial and resistance genetargets.  Please refer to the [facility] test directory  at [email or web address] for details.  --  Blood Culture PCR  Escherichia coli  Klepne MDRO      .Urine Clean Catch (Midstream)  02-06-21   >100,000 CFU/ml Escherichia coli ESBL  --  Escherichia coli ESBL      .Blood Blood  02-06-21   Growth in aerobic bottle: Escherichia coli  See previous culture [id number]  --    Growth in aerobic bottle: Gram Negative Rods    RADIOLOGY:    < from: MR MRCP w/wo IV Cont (02.08.21 @ 20:25) >  IMPRESSION:  Acute gangrenous cholecystitis with large gallstone in the neck compressing the common duct resulting in moderate intrahepatic biliary ductal dilatation (Mirizzi syndrome). In addition, gallbladder lumen appears to communicate with the common hepatic duct, suspicious for a cholecysto-choledochal fistula.    < end of copied text >

## 2021-02-16 NOTE — ADVANCED PRACTICE NURSE CONSULT - ASSESSMENT
Patient is aware and alert. Midline insertion along with risks, benefits, possible complications and infection prevention explained to patient who verbalized understanding. All questions addressed and patient gave verbal consent to place midline. Left arm cleansed with CHG. Using sterile technique under ultra sound guidance, placed PowerGlide Pro Midline 20G /10cm into Left Cephalic vein. Brisk blood return and flushed with 20Mls of normal saline. Minimal blood loss and patient tolerated procedure well. CHG dressing placed. All sharps accounted for. Report given to district RN and ordering provider. LOT#: [id number], REF#: [id number]

## 2021-02-16 NOTE — DIETITIAN INITIAL EVALUATION ADULT. - ETIOLOGY
Pt meets criteria for severe malnutrition in the setting of acute/chronic illness. Pt meets criteria for moderate malnutrition in the setting of acute/chronic illness.

## 2021-02-16 NOTE — DIETITIAN INITIAL EVALUATION ADULT. - PROBLEM SELECTOR PLAN 5
Pt reports baseline SOB, but states it has worsened with productive cough, subjective fevers/chills, and found to have leukocytosis.  - TTE (8/20/20): EF 72%, grossly normal LV/RV systolic function; mild pulmHTN  - currently satting well on RA, continue to monitor respiratory status  - f/u CXR to r/o PNA

## 2021-02-16 NOTE — DIETITIAN INITIAL EVALUATION ADULT. - PERTINENT MEDS FT
MEDICATIONS  (STANDING):  ascorbic acid 500 milliGRAM(s) Oral daily  calcium carbonate 1250 mG  + Vitamin D (OsCal 500 + D) 1 Tablet(s) Oral daily  dextrose 40% Gel 15 Gram(s) Oral once  dextrose 50% Injectable 25 Gram(s) IV Push once  dextrose 50% Injectable 12.5 Gram(s) IV Push once  dextrose 50% Injectable 25 Gram(s) IV Push once  furosemide    Tablet 20 milliGRAM(s) Oral daily  gabapentin 100 milliGRAM(s) Oral three times a day  glucagon  Injectable 1 milliGRAM(s) IntraMuscular once  influenza   Vaccine 0.5 milliLiter(s) IntraMuscular once  lactulose Syrup 30 Gram(s) Oral two times a day  lidocaine   Patch 1 Patch Transdermal daily  meropenem/vaborbactam IVPB 4 Gram(s) IV Intermittent every 8 hours  multivitamin 1 Tablet(s) Oral daily  pantoprazole    Tablet 40 milliGRAM(s) Oral before breakfast  polyethylene glycol 3350 17 Gram(s) Oral daily  propranolol 10 milliGRAM(s) Oral two times a day  senna 2 Tablet(s) Oral at bedtime

## 2021-02-16 NOTE — PROGRESS NOTE ADULT - SUBJECTIVE AND OBJECTIVE BOX
PROGRESS NOTE:   Authored by Gorge Middleton MD  PGY-1, Internal Medicine  Pager [facility] [phone], J [id number]     Patient is a 80y old  Male who presents with a chief complaint of abdominal pain, emesis, dark stools (15 Feb 2021 07:31)      SUBJECTIVE / OVERNIGHT EVENTS: No events overnight.    ADDITIONAL REVIEW OF SYSTEMS: Denies fevers, chills, n/v.    MEDICATIONS  (STANDING):  ascorbic acid 500 milliGRAM(s) Oral daily  calcium carbonate 1250 mG  + Vitamin D (OsCal 500 + D) 1 Tablet(s) Oral daily  dextrose 40% Gel 15 Gram(s) Oral once  dextrose 50% Injectable 25 Gram(s) IV Push once  dextrose 50% Injectable 12.5 Gram(s) IV Push once  dextrose 50% Injectable 25 Gram(s) IV Push once  gabapentin 100 milliGRAM(s) Oral three times a day  glucagon  Injectable 1 milliGRAM(s) IntraMuscular once  influenza   Vaccine 0.5 milliLiter(s) IntraMuscular once  lactulose Syrup 30 Gram(s) Oral two times a day  lidocaine   Patch 1 Patch Transdermal daily  meropenem/vaborbactam IVPB 4 Gram(s) IV Intermittent every 8 hours  multivitamin 1 Tablet(s) Oral daily  pantoprazole    Tablet 40 milliGRAM(s) Oral before breakfast  polyethylene glycol 3350 17 Gram(s) Oral daily  propranolol 10 milliGRAM(s) Oral two times a day  senna 2 Tablet(s) Oral at bedtime    MEDICATIONS  (PRN):  acetaminophen   Tablet .. 650 milliGRAM(s) Oral every 8 hours PRN Temp greater or equal to 38C (100.4F), Mild Pain (1 - 3), Moderate Pain (4 - 6)  bisacodyl Suppository 10 milliGRAM(s) Rectal daily PRN Constipation      CAPILLARY BLOOD GLUCOSE        I&O's Summary    15 Feb 2021 07:01  -  16 Feb 2021 07:00  --------------------------------------------------------  IN: 0 mL / OUT: 500 mL / NET: -500 mL        PHYSICAL EXAM:  Vital Signs Last 24 Hrs  T(C): 36.6 (15 Feb 2021 22:23), Max: 36.6 (15 Feb 2021 22:23)  T(F): 97.9 (15 Feb 2021 22:23), Max: 97.9 (15 Feb 2021 22:23)  HR: 60 (16 Feb 2021 07:00) (60 - 63)  BP: 126/66 (16 Feb 2021 07:00) (126/66 - 143/84)  BP(mean): --  RR: 16 (16 Feb 2021 07:00) (16 - 17)  SpO2: 97% (16 Feb 2021 07:00) (97% - 100%)    CONSTITUTIONAL: NAD, lying in bed comfortably  RESPIRATORY: Normal respiratory effort; CTABL  CARDIOVASCULAR: Regular rate and rhythm, normal S1 and S2, no murmur/rub/gallop  ABDOMEN: Soft, nondistended, nontender to palpation, normoactive bowel sounds, no rebound/guarding  MUSCLOSKELETAL: no joint swelling or tenderness to palpation, FROM all extremities  NEURO: AAOx3 to person, place, and time, full and equal strength all extremities   EXTREMITIES: no pedal edema    LABS:                        11.2   7.78  )-----------( 210      ( 14 Feb 2021 08:01 )             34.2     02-15    138  |  104  |  14  ----------------------------<  83  4.2   |  26  |  1.06    Ca    8.8      15 Feb 2021 06:42  Phos  2.9     02-15  Mg     1.6     02-15    TPro  6.2  /  Alb  2.2<L>  /  TBili  4.4<H>  /  DBili  x   /  AST  67<H>  /  ALT  27  /  AlkPhos  443<H>  02-15    PT/INR - ( 14 Feb 2021 08:01 )   PT: 18.3 sec;   INR: 1.63 ratio         PTT - ( 14 Feb 2021 08:01 )  PTT:28.9 sec          Culture - Blood (collected 13 Feb 2021 10:10)  Source: .Blood Blood-Venous  Preliminary Report (14 Feb 2021 11:02):    No growth to date.    Culture - Blood (collected 13 Feb 2021 10:10)  Source: .Blood Blood-Peripheral  Preliminary Report (14 Feb 2021 11:02):    No growth to date.        RADIOLOGY & ADDITIONAL TESTS:

## 2021-02-16 NOTE — PROGRESS NOTE ADULT - ATTENDING COMMENTS
80 y.o. M w/ a hx of cirrhosis c/b EV s/p banding, HTN currently hospitalized for MDRO E.coli,  Klebsiella, Strep anginosus bacteremia 2/2 gangrenous cholecystitis, cholangitis currently on Vabomere now s/p ERCP w/ stenting.   Feels better, still has some RUQ TTP, mild.     # MDRO polymicrobial bacteremia 2/2 acute cholangitis: S/p ERCP & stent w/ GI. Cont. Vabomere. ID, GI following. Repeat BCx NGTD, TTE w/o vegetations. Will d/w ID abx course  # Strep constellatus bacteremia: TTE neg, no dental infection noted, f/u OP. Abx as above.  # Cirrhosis: Cont Lactulose, Propanolol. Hbg stable.  # hypophosphatemia; resolved with supplement , monitor BMP  # Dispo: Rehab, patient agreeable.     I have personally seen, examined and participated in the care of this patient. I have reviewed all pertinent clinical information, including history, physical exam, plan and the resident's note and agree except as noted. 80 y.o. M w/ a hx of cirrhosis c/b EV s/p banding, HTN currently hospitalized for MDRO E.coli,  Klebsiella, Strep anginosus bacteremia 2/2 gangrenous cholecystitis, cholangitis currently on Vabomere now s/p ERCP w/ stenting.   Feels better, still has some RUQ TTP, mild.     # MDRO polymicrobial bacteremia 2/2 acute cholangitis: S/p ERCP & stent w/ GI. Cont. Vabomere. ID, GI following. Repeat BCx NGTD, TTE w/o vegetations. Will d/w ID abx course  # Strep constellatus bacteremia: TTE neg, no dental infection noted, f/u OP. Abx as above.  # Cirrhosis: Cont Lactulose, Propanolol. Hbg stable.  # hypophosphatemia; resolved with supplement , monitor BMP  # Dispo: Rehab, will discuss w/ family     I have personally seen, examined and participated in the care of this patient. I have reviewed all pertinent clinical information, including history, physical exam, plan and the resident's note and agree except as noted.

## 2021-02-16 NOTE — DIETITIAN INITIAL EVALUATION ADULT. - PROBLEM SELECTOR PLAN 2
Pt presented with RUQ pain, leukocytosis, subjective fevers/chills, elevated T.bili 4.7, direct 2.3 and indirect 2.4, Alk phos 516, , ALT 51. Pt has hx of porcelain gallbladder (but 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 in the past).  - CT A/P w/ IV con (2/6/21): Mild intrahepatic biliary ductal dilation and irregular gallbladder wall, new since 4/1/2020 with interval removal of pancreatic duct stent.  - Concern for possible cholangitis  - f/u BCx  - c/w Zosyn (2/6/21- )  - MRCP ordered  - GI/Hepatology consulted by ED, f/u recs

## 2021-02-16 NOTE — DIETITIAN INITIAL EVALUATION ADULT. - PROBLEM SELECTOR PLAN 8
Hx of HTN  - not on any home antihypertensives aside from propanolol 10mg BID (for varices)  - continue to monitor BP

## 2021-02-16 NOTE — DIETITIAN INITIAL EVALUATION ADULT. - PROBLEM SELECTOR PLAN 9
- DVT ppx: Lovenox  - Diet: DASH/TLC  - Dispo: pending medical optimization  - GOC: spoke to wife regarding GOC, stated pt is FULL CODE

## 2021-02-16 NOTE — PROGRESS NOTE ADULT - ASSESSMENT
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 Now S/p ERCP with stent placed.

## 2021-02-17 LAB
ALBUMIN SERPL ELPH-MCNC: 2.2 G/DL — LOW (ref 3.3–5)
ALP SERPL-CCNC: 479 U/L — HIGH (ref 40–120)
ALT FLD-CCNC: 21 U/L — SIGNIFICANT CHANGE UP (ref 4–41)
ANION GAP SERPL CALC-SCNC: 8 MMOL/L — SIGNIFICANT CHANGE UP (ref 7–14)
AST SERPL-CCNC: 56 U/L — HIGH (ref 4–40)
BILIRUB SERPL-MCNC: 3.3 MG/DL — HIGH (ref 0.2–1.2)
BUN SERPL-MCNC: 17 MG/DL — SIGNIFICANT CHANGE UP (ref 7–23)
CALCIUM SERPL-MCNC: 8.9 MG/DL — SIGNIFICANT CHANGE UP (ref 8.4–10.5)
CHLORIDE SERPL-SCNC: 101 MMOL/L — SIGNIFICANT CHANGE UP (ref 98–107)
CO2 SERPL-SCNC: 26 MMOL/L — SIGNIFICANT CHANGE UP (ref 22–31)
CREAT SERPL-MCNC: 1 MG/DL — SIGNIFICANT CHANGE UP (ref 0.5–1.3)
CULTURE RESULTS: SIGNIFICANT CHANGE UP
GLUCOSE SERPL-MCNC: 94 MG/DL — SIGNIFICANT CHANGE UP (ref 70–99)
HCT VFR BLD CALC: 36.2 % — LOW (ref 39–50)
HGB BLD-MCNC: 11.2 G/DL — LOW (ref 13–17)
INR BLD: 1.62 RATIO — HIGH (ref 0.88–1.16)
MAGNESIUM SERPL-MCNC: 1.7 MG/DL — SIGNIFICANT CHANGE UP (ref 1.6–2.6)
MCHC RBC-ENTMCNC: 24.2 PG — LOW (ref 27–34)
MCHC RBC-ENTMCNC: 30.9 GM/DL — LOW (ref 32–36)
MCV RBC AUTO: 78.4 FL — LOW (ref 80–100)
NRBC # BLD: 0 /100 WBCS — SIGNIFICANT CHANGE UP
NRBC # FLD: 0 K/UL — SIGNIFICANT CHANGE UP
PHOSPHATE SERPL-MCNC: 2.8 MG/DL — SIGNIFICANT CHANGE UP (ref 2.5–4.5)
PLATELET # BLD AUTO: 226 K/UL — SIGNIFICANT CHANGE UP (ref 150–400)
POTASSIUM SERPL-MCNC: 4.1 MMOL/L — SIGNIFICANT CHANGE UP (ref 3.5–5.3)
POTASSIUM SERPL-SCNC: 4.1 MMOL/L — SIGNIFICANT CHANGE UP (ref 3.5–5.3)
PROT SERPL-MCNC: 6.3 G/DL — SIGNIFICANT CHANGE UP (ref 6–8.3)
PROTHROM AB SERPL-ACNC: 18.1 SEC — HIGH (ref 10.6–13.6)
RBC # BLD: 4.62 M/UL — SIGNIFICANT CHANGE UP (ref 4.2–5.8)
RBC # FLD: 21.9 % — HIGH (ref 10.3–14.5)
SODIUM SERPL-SCNC: 135 MMOL/L — SIGNIFICANT CHANGE UP (ref 135–145)
SPECIMEN SOURCE: SIGNIFICANT CHANGE UP
WBC # BLD: 7 K/UL — SIGNIFICANT CHANGE UP (ref 3.8–10.5)
WBC # FLD AUTO: 7 K/UL — SIGNIFICANT CHANGE UP (ref 3.8–10.5)

## 2021-02-17 PROCEDURE — 99232 SBSQ HOSP IP/OBS MODERATE 35: CPT | Mod: GC

## 2021-02-17 RX ORDER — FUROSEMIDE 40 MG
1 TABLET ORAL
Qty: 0 | Refills: 0 | DISCHARGE
Start: 2021-02-17

## 2021-02-17 RX ORDER — PANTOPRAZOLE SODIUM 20 MG/1
1 TABLET, DELAYED RELEASE ORAL
Qty: 0 | Refills: 0 | DISCHARGE
Start: 2021-02-17

## 2021-02-17 RX ORDER — PROPRANOLOL HCL 160 MG
1 CAPSULE, EXTENDED RELEASE 24HR ORAL
Qty: 0 | Refills: 0 | DISCHARGE

## 2021-02-17 RX ORDER — POLYETHYLENE GLYCOL 3350 17 G/17G
17 POWDER, FOR SOLUTION ORAL
Qty: 0 | Refills: 0 | DISCHARGE
Start: 2021-02-17

## 2021-02-17 RX ORDER — SENNA PLUS 8.6 MG/1
2 TABLET ORAL
Qty: 0 | Refills: 0 | DISCHARGE
Start: 2021-02-17

## 2021-02-17 RX ORDER — FUROSEMIDE 40 MG
1 TABLET ORAL
Qty: 0 | Refills: 0 | DISCHARGE

## 2021-02-17 RX ORDER — CEFTRIAXONE 500 MG/1
2 INJECTION, POWDER, FOR SOLUTION INTRAMUSCULAR; INTRAVENOUS
Qty: 0 | Refills: 0 | DISCHARGE
Start: 2021-02-17

## 2021-02-17 RX ORDER — PROPRANOLOL HCL 160 MG
1 CAPSULE, EXTENDED RELEASE 24HR ORAL
Qty: 0 | Refills: 0 | DISCHARGE
Start: 2021-02-17

## 2021-02-17 RX ORDER — ASCORBIC ACID 60 MG
1 TABLET,CHEWABLE ORAL
Qty: 0 | Refills: 0 | DISCHARGE
Start: 2021-02-17

## 2021-02-17 RX ORDER — GABAPENTIN 400 MG/1
1 CAPSULE ORAL
Qty: 0 | Refills: 0 | DISCHARGE
Start: 2021-02-17

## 2021-02-17 RX ORDER — LACTULOSE 10 G/15ML
45 SOLUTION ORAL
Qty: 0 | Refills: 0 | DISCHARGE
Start: 2021-02-17

## 2021-02-17 RX ORDER — SALIVA SUBSTITUTE COMB NO.11 351 MG
1 POWDER IN PACKET (EA) MUCOUS MEMBRANE
Qty: 0 | Refills: 0 | DISCHARGE

## 2021-02-17 RX ADMIN — Medication 1 TABLET(S): at 11:47

## 2021-02-17 RX ADMIN — PANTOPRAZOLE SODIUM 40 MILLIGRAM(S): 20 TABLET, DELAYED RELEASE ORAL at 06:06

## 2021-02-17 RX ADMIN — CEFTRIAXONE 100 MILLIGRAM(S): 500 INJECTION, POWDER, FOR SOLUTION INTRAMUSCULAR; INTRAVENOUS at 06:03

## 2021-02-17 RX ADMIN — GABAPENTIN 100 MILLIGRAM(S): 400 CAPSULE ORAL at 06:05

## 2021-02-17 RX ADMIN — Medication 500 MILLIGRAM(S): at 11:47

## 2021-02-17 RX ADMIN — LACTULOSE 30 GRAM(S): 10 SOLUTION ORAL at 17:40

## 2021-02-17 RX ADMIN — POLYETHYLENE GLYCOL 3350 17 GRAM(S): 17 POWDER, FOR SOLUTION ORAL at 11:48

## 2021-02-17 RX ADMIN — Medication 20 MILLIGRAM(S): at 06:06

## 2021-02-17 RX ADMIN — GABAPENTIN 100 MILLIGRAM(S): 400 CAPSULE ORAL at 22:30

## 2021-02-17 RX ADMIN — LACTULOSE 30 GRAM(S): 10 SOLUTION ORAL at 06:05

## 2021-02-17 RX ADMIN — LIDOCAINE 1 PATCH: 4 CREAM TOPICAL at 11:48

## 2021-02-17 RX ADMIN — SENNA PLUS 2 TABLET(S): 8.6 TABLET ORAL at 22:30

## 2021-02-17 RX ADMIN — GABAPENTIN 100 MILLIGRAM(S): 400 CAPSULE ORAL at 15:47

## 2021-02-17 NOTE — PROGRESS NOTE ADULT - ATTENDING COMMENTS
80 y.o. M w/ a hx of cirrhosis c/b EV s/p banding, HTN currently hospitalized for MDRO E.coli,  Klebsiella, Strep anginosus bacteremia 2/2 gangrenous cholecystitis, cholangitis currently on Vabomere now s/p ERCP w/ stenting.   Feels better, still has some RUQ TTP, mild.     # MDRO polymicrobial bacteremia 2/2 acute cholangitis: S/p ERCP & stent w/ GI. Cont. CTX. ID, GI following. Repeat BCx NGTD, TTE w/o vegetations.  # Strep constellatus bacteremia: TTE neg, no dental infection noted, f/u OP. Abx as above.  # Cirrhosis: Cont Lactulose, Propanolol. Hbg stable.  # hypophosphatemia; resolved with supplement , monitor BMP  # Dispo: Medically stable for d/c to rehab    I have personally seen, examined and participated in the care of this patient. I have reviewed all pertinent clinical information, including history, physical exam, plan and the resident's note and agree except as noted.

## 2021-02-17 NOTE — PROGRESS NOTE ADULT - ASSESSMENT
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 Now S/p ERCP with stent placed.

## 2021-02-17 NOTE — PROGRESS NOTE ADULT - PROBLEM SELECTOR PLAN 1
MDRO polymicrobial bacteremia; growing E.coli and Klebsiella with repeat blood cx growing streptococcus constellatus.   - s/p avycaz (2/7 - 2/9)  - s/p vabomere (2/9 - 2/16)  - TTE with normal LV function, no vegetations   - Dental evaluation, no source of infection on exam, outpatient dental follow up  - Blood cxs from 2/11, 2/13 remain NGTD  - Now on ceftriaxone (2/17 - 2/24)  - Midline in place in anticipation for discharge and continuing abx in rehab  - ID following, appreciate recs

## 2021-02-17 NOTE — PROGRESS NOTE ADULT - SUBJECTIVE AND OBJECTIVE BOX
PROGRESS NOTE:   Authored by Gorge Middleton MD  PGY-1, Internal Medicine  Pager [facility] [phone], J [id number]     Patient is a 80y old  Male who presents with a chief complaint of abdominal pain, emesis, dark stools (16 Feb 2021 13:23)      SUBJECTIVE / OVERNIGHT EVENTS: No events overnight. Midline placed yesterday.     ADDITIONAL REVIEW OF SYSTEMS: Denies fevers, chills, n/v.    MEDICATIONS  (STANDING):  ascorbic acid 500 milliGRAM(s) Oral daily  calcium carbonate 1250 mG  + Vitamin D (OsCal 500 + D) 1 Tablet(s) Oral daily  cefTRIAXone   IVPB 2000 milliGRAM(s) IV Intermittent every 24 hours  furosemide    Tablet 20 milliGRAM(s) Oral daily  gabapentin 100 milliGRAM(s) Oral three times a day  influenza   Vaccine 0.5 milliLiter(s) IntraMuscular once  lactulose Syrup 30 Gram(s) Oral two times a day  lidocaine   Patch 1 Patch Transdermal daily  multivitamin 1 Tablet(s) Oral daily  pantoprazole    Tablet 40 milliGRAM(s) Oral before breakfast  polyethylene glycol 3350 17 Gram(s) Oral daily  propranolol 10 milliGRAM(s) Oral two times a day  senna 2 Tablet(s) Oral at bedtime    MEDICATIONS  (PRN):  acetaminophen   Tablet .. 650 milliGRAM(s) Oral every 8 hours PRN Temp greater or equal to 38C (100.4F), Mild Pain (1 - 3), Moderate Pain (4 - 6)  bisacodyl Suppository 10 milliGRAM(s) Rectal daily PRN Constipation      CAPILLARY BLOOD GLUCOSE      POCT Blood Glucose.: 125 mg/dL (16 Feb 2021 12:10)  POCT Blood Glucose.: 87 mg/dL (16 Feb 2021 09:18)    I&O's Summary    16 Feb 2021 07:01  -  17 Feb 2021 07:00  --------------------------------------------------------  IN: 0 mL / OUT: 1400 mL / NET: -1400 mL        PHYSICAL EXAM:  Vital Signs Last 24 Hrs  T(C): 36.6 (16 Feb 2021 21:55), Max: 36.6 (16 Feb 2021 21:55)  T(F): 97.8 (16 Feb 2021 21:55), Max: 97.8 (16 Feb 2021 21:55)  HR: 62 (16 Feb 2021 21:55) (60 - 64)  BP: 111/59 (16 Feb 2021 21:55) (111/59 - 141/72)  BP(mean): --  RR: 17 (16 Feb 2021 21:55) (17 - 17)  SpO2: 97% (16 Feb 2021 21:55) (97% - 97%)    CONSTITUTIONAL: NAD, lying in bed comfortably  RESPIRATORY: Normal respiratory effort; CTABL  CARDIOVASCULAR: Regular rate and rhythm, normal S1 and S2, no murmur/rub/gallop  ABDOMEN: Soft, nondistended, diffuse tenderness to palpation unchanged from previous exam, normoactive bowel sounds, no rebound/guarding  MUSCLOSKELETAL: no joint swelling or tenderness to palpation, FROM all extremities  NEURO: AAOx2-3, full and equal strength all extremities   EXTREMITIES: no pedal edema    LABS:                        11.5   7.11  )-----------( 231      ( 16 Feb 2021 08:12 )             37.2     02-16    138  |  103  |  15  ----------------------------<  96  4.2   |  26  |  0.98    Ca    8.9      16 Feb 2021 08:25  Phos  2.4     02-16  Mg     1.7     02-16    TPro  6.5  /  Alb  2.3<L>  /  TBili  3.9<H>  /  DBili  x   /  AST  56<H>  /  ALT  23  /  AlkPhos  475<H>  02-16

## 2021-02-18 ENCOUNTER — TRANSCRIPTION ENCOUNTER (OUTPATIENT)
Age: 81
End: 2021-02-18

## 2021-02-18 VITALS
SYSTOLIC BLOOD PRESSURE: 112 MMHG | RESPIRATION RATE: 16 BRPM | OXYGEN SATURATION: 99 % | HEART RATE: 57 BPM | DIASTOLIC BLOOD PRESSURE: 66 MMHG | TEMPERATURE: 98 F

## 2021-02-18 LAB
ALBUMIN SERPL ELPH-MCNC: 2.2 G/DL — LOW (ref 3.3–5)
ALP SERPL-CCNC: 476 U/L — HIGH (ref 40–120)
ALT FLD-CCNC: 22 U/L — SIGNIFICANT CHANGE UP (ref 4–41)
ANION GAP SERPL CALC-SCNC: 7 MMOL/L — SIGNIFICANT CHANGE UP (ref 7–14)
AST SERPL-CCNC: 57 U/L — HIGH (ref 4–40)
BILIRUB SERPL-MCNC: 2.8 MG/DL — HIGH (ref 0.2–1.2)
BUN SERPL-MCNC: 18 MG/DL — SIGNIFICANT CHANGE UP (ref 7–23)
CALCIUM SERPL-MCNC: 8.8 MG/DL — SIGNIFICANT CHANGE UP (ref 8.4–10.5)
CHLORIDE SERPL-SCNC: 103 MMOL/L — SIGNIFICANT CHANGE UP (ref 98–107)
CO2 SERPL-SCNC: 27 MMOL/L — SIGNIFICANT CHANGE UP (ref 22–31)
CREAT SERPL-MCNC: 1.09 MG/DL — SIGNIFICANT CHANGE UP (ref 0.5–1.3)
CULTURE RESULTS: SIGNIFICANT CHANGE UP
CULTURE RESULTS: SIGNIFICANT CHANGE UP
GLUCOSE SERPL-MCNC: 92 MG/DL — SIGNIFICANT CHANGE UP (ref 70–99)
HCT VFR BLD CALC: 33.4 % — LOW (ref 39–50)
HGB BLD-MCNC: 10.6 G/DL — LOW (ref 13–17)
MAGNESIUM SERPL-MCNC: 1.7 MG/DL — SIGNIFICANT CHANGE UP (ref 1.6–2.6)
MCHC RBC-ENTMCNC: 24.7 PG — LOW (ref 27–34)
MCHC RBC-ENTMCNC: 31.7 GM/DL — LOW (ref 32–36)
MCV RBC AUTO: 77.7 FL — LOW (ref 80–100)
NRBC # BLD: 0 /100 WBCS — SIGNIFICANT CHANGE UP
NRBC # FLD: 0 K/UL — SIGNIFICANT CHANGE UP
PHOSPHATE SERPL-MCNC: 2.6 MG/DL — SIGNIFICANT CHANGE UP (ref 2.5–4.5)
PLATELET # BLD AUTO: 203 K/UL — SIGNIFICANT CHANGE UP (ref 150–400)
POTASSIUM SERPL-MCNC: 4.1 MMOL/L — SIGNIFICANT CHANGE UP (ref 3.5–5.3)
POTASSIUM SERPL-SCNC: 4.1 MMOL/L — SIGNIFICANT CHANGE UP (ref 3.5–5.3)
PROT SERPL-MCNC: 6.3 G/DL — SIGNIFICANT CHANGE UP (ref 6–8.3)
RBC # BLD: 4.3 M/UL — SIGNIFICANT CHANGE UP (ref 4.2–5.8)
RBC # FLD: 22.2 % — HIGH (ref 10.3–14.5)
SODIUM SERPL-SCNC: 137 MMOL/L — SIGNIFICANT CHANGE UP (ref 135–145)
SPECIMEN SOURCE: SIGNIFICANT CHANGE UP
SPECIMEN SOURCE: SIGNIFICANT CHANGE UP
WBC # BLD: 7.36 K/UL — SIGNIFICANT CHANGE UP (ref 3.8–10.5)
WBC # FLD AUTO: 7.36 K/UL — SIGNIFICANT CHANGE UP (ref 3.8–10.5)

## 2021-02-18 PROCEDURE — 99238 HOSP IP/OBS DSCHRG MGMT 30/<: CPT | Mod: GC

## 2021-02-18 PROCEDURE — 99232 SBSQ HOSP IP/OBS MODERATE 35: CPT

## 2021-02-18 RX ADMIN — LIDOCAINE 1 PATCH: 4 CREAM TOPICAL at 10:59

## 2021-02-18 RX ADMIN — LIDOCAINE 1 PATCH: 4 CREAM TOPICAL at 00:06

## 2021-02-18 RX ADMIN — GABAPENTIN 100 MILLIGRAM(S): 400 CAPSULE ORAL at 05:29

## 2021-02-18 RX ADMIN — Medication 1 TABLET(S): at 10:59

## 2021-02-18 RX ADMIN — Medication 20 MILLIGRAM(S): at 05:29

## 2021-02-18 RX ADMIN — POLYETHYLENE GLYCOL 3350 17 GRAM(S): 17 POWDER, FOR SOLUTION ORAL at 10:59

## 2021-02-18 RX ADMIN — GABAPENTIN 100 MILLIGRAM(S): 400 CAPSULE ORAL at 12:27

## 2021-02-18 RX ADMIN — Medication 500 MILLIGRAM(S): at 10:59

## 2021-02-18 RX ADMIN — CEFTRIAXONE 100 MILLIGRAM(S): 500 INJECTION, POWDER, FOR SOLUTION INTRAMUSCULAR; INTRAVENOUS at 05:29

## 2021-02-18 RX ADMIN — LACTULOSE 30 GRAM(S): 10 SOLUTION ORAL at 05:30

## 2021-02-18 RX ADMIN — PANTOPRAZOLE SODIUM 40 MILLIGRAM(S): 20 TABLET, DELAYED RELEASE ORAL at 06:05

## 2021-02-18 NOTE — PROGRESS NOTE ADULT - PROBLEM SELECTOR PLAN 5
Pt's wife reported to episodes of "dark stools" in past 2-3 days. 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  - follows with Hepatologist Dr. Mcgraw outpatient  - Hgb wnl on admission  - continue to monitor CBC and BMs  - c/w home pantoprazole 40mg PO daily
Pt reports baseline SOB, but states it has worsened with productive cough, subjective fevers/chills, and found to have leukocytosis.  - TTE (8/20/20): EF 72%, grossly normal LV/RV systolic function; mild pulmHTN  - currently satting well on RA, continue to monitor respiratory status  - f/u CXR to r/o PNA
Pt's wife reported to episodes of "dark stools" in past 2-3 days. 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  - follows with Hepatologist Dr. Mcgraw outpatient  - Hgb wnl on admission  - continue to monitor CBC and BMs  - c/w home pantoprazole 40mg PO daily
Pt's wife reported to episodes of "dark stools" in past 2-3 days. 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  - follows with Hepatologist Dr. Mcgraw outpatient  - Hgb wnl on admission  - continue to monitor CBC and BMs  - c/w home pantoprazole 40mg PO daily  - GI/Hepatology consulted by ED, f/u recs
Pt's wife reported to episodes of "dark stools" in past 2-3 days. 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  - follows with Hepatologist Dr. Mcgraw outpatient  - Hgb wnl on admission  - continue to monitor CBC and BMs  - c/w home pantoprazole 40mg PO daily

## 2021-02-18 NOTE — PROGRESS NOTE ADULT - PROBLEM SELECTOR PLAN 6
Pt reports baseline SOB, but states it has worsened with productive cough, subjective fevers/chills, and found to have leukocytosis.  - TTE (8/20/20): EF 72%, grossly normal LV/RV systolic function; mild pulmHTN  - currently satting well on RA, continue to monitor respiratory status  - CXR clear
Appears to be stable. 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 At admission, pt does not appear overloaded on exam.  - c/w home lactulose 30g BID  - c/w home propanolol 10mg BID (for varices)  - c/w home furosemide 20mg daily  - GI/Hepatology consulted by ED, f/u recs
Pt reports baseline SOB, but states it has worsened with productive cough, subjective fevers/chills, and found to have leukocytosis.  - TTE (8/20/20): EF 72%, grossly normal LV/RV systolic function; mild pulmHTN  - currently satting well on RA, continue to monitor respiratory status  - CXR clear
Pt reports baseline SOB, but states it has worsened with productive cough, subjective fevers/chills, and found to have leukocytosis.  - TTE (8/20/20): EF 72%, grossly normal LV/RV systolic function; mild pulmHTN  - currently satting well on RA, continue to monitor respiratory status  - f/u CXR to r/o PNA
Pt reports baseline SOB, but states it has worsened with productive cough, subjective fevers/chills, and found to have leukocytosis.  - TTE (8/20/20): EF 72%, grossly normal LV/RV systolic function; mild pulmHTN  - currently satting well on RA, continue to monitor respiratory status  - CXR clear
Pt reports baseline SOB, but states it has worsened with productive cough, subjective fevers/chills, and found to have leukocytosis.  - TTE (8/20/20): EF 72%, grossly normal LV/RV systolic function; mild pulmHTN  - currently satting well on RA, continue to monitor respiratory status  - CXR clear

## 2021-02-18 NOTE — PROGRESS NOTE ADULT - PROBLEM SELECTOR PLAN 9
- DVT ppx: Lovenox  - Diet: DASH/TLC  - Dispo: pending medical optimization  - GOC: spoke to wife regarding GOC, stated pt is FULL CODE
Transitions of Care Status:  1.  Name of PCP: Dr. Anthony Mauricio  2.  PCP Contacted on Admission: [ ] Y    [ ] N    3.  PCP contacted at Discharge: [ ] Y    [ ] N    [ ] N/A  4.  Post-Discharge Appointment Date and Location:  5.  Summary of Handoff given to PCP:

## 2021-02-18 NOTE — PROGRESS NOTE ADULT - SUBJECTIVE AND OBJECTIVE BOX
CC: Patient is a 80y old  Male who presents with a chief complaint of abdominal pain, emesis, dark stools (18 Feb 2021 11:52)    ID following for bacteremia    Interval History/ROS: Patient with improvement in right sided abd pain. No fevers, no chills.    Rest of ROS negative.    Allergies  No Known Allergies    ANTIMICROBIALS:  cefTRIAXone   IVPB 2000 every 24 hours    OTHER MEDS:  acetaminophen   Tablet .. 650 milliGRAM(s) Oral every 8 hours PRN  ascorbic acid 500 milliGRAM(s) Oral daily  bisacodyl Suppository 10 milliGRAM(s) Rectal daily PRN  calcium carbonate 1250 mG  + Vitamin D (OsCal 500 + D) 1 Tablet(s) Oral daily  furosemide    Tablet 20 milliGRAM(s) Oral daily  gabapentin 100 milliGRAM(s) Oral three times a day  influenza   Vaccine 0.5 milliLiter(s) IntraMuscular once  lactulose Syrup 30 Gram(s) Oral two times a day  lidocaine   Patch 1 Patch Transdermal daily  multivitamin 1 Tablet(s) Oral daily  pantoprazole    Tablet 40 milliGRAM(s) Oral before breakfast  polyethylene glycol 3350 17 Gram(s) Oral daily  propranolol 10 milliGRAM(s) Oral two times a day  senna 2 Tablet(s) Oral at bedtime    PE:    Vital Signs Last 24 Hrs  T(C): 36.9 (18 Feb 2021 05:01), Max: 37.2 (17 Feb 2021 21:12)  T(F): 98.4 (18 Feb 2021 05:01), Max: 98.9 (17 Feb 2021 21:12)  HR: 60 (18 Feb 2021 07:23) (60 - 64)  BP: 119/56 (18 Feb 2021 05:01) (119/56 - 146/75)  BP(mean): --  RR: 18 (18 Feb 2021 05:01) (18 - 18)  SpO2: 97% (18 Feb 2021 05:01) (97% - 100%)    Gen: AOx3, NAD  CV: S1+S2 normal, no murmurs  Resp: Clear bilat, no resp distress  Abd: Soft, right sided abd pain improved, +BS  Ext: No LE edema, no wounds  : No Garcia  IV/Skin: No thrombophlebitis, midline intact  Neuro: no focal deficits    LABS:                          10.6   7.36  )-----------( 203      ( 18 Feb 2021 08:03 )             33.4       02-18    137  |  103  |  18  ----------------------------<  92  4.1   |  27  |  1.09    Ca    8.8      18 Feb 2021 08:03  Phos  2.6     02-18  Mg     1.7     02-18    TPro  6.3  /  Alb  2.2<L>  /  TBili  2.8<H>  /  DBili  x   /  AST  57<H>  /  ALT  22  /  AlkPhos  476<H>  02-18          MICROBIOLOGY:  v  .Blood Blood-Venous  02-13-21   No Growth Final  --  --      .Blood Blood-Peripheral  02-13-21   No Growth Final  --  --      .Blood Blood-Venous  02-11-21   No Growth Final  --  --      .Blood Blood  02-09-21   Growth in aerobic and anaerobic bottles: Streptococcus constellatus  See previous culture [id number]  --  Blood Culture PCR  Streptococcus constellatus      .Blood Blood-Venous  02-06-21   Growth in aerobic bottle: Escherichia coli  Growth in anaerobic bottle: Klebsiella pneumoniae (Carbapenem Resistant)  ***Blood Panel PCR results on this specimen are available  approximately 3 hours after the Gram stain result.***  Gram stain, PCR,and/or culture results may not always  correspond due to difference in methodologies.  ************************************************************  This PCR assay was performed by multiplex PCR. This  Assay tests for 66 bacterial and resistance genetargets.  Please refer to the [facility] test directory  at [email or web address] for details.  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 In addition, gallbladder lumen appears to communicate with the common hepatic duct, suspicious for a cholecysto-choledochal fistula.    < end of copied text >

## 2021-02-18 NOTE — PROGRESS NOTE ADULT - PROBLEM SELECTOR PROBLEM 6
Liver cirrhosis
SOB (shortness of breath)

## 2021-02-18 NOTE — PROGRESS NOTE ADULT - NUTRITIONAL ASSESSMENT
This patient has been assessed with a concern for Malnutrition and has been determined to have a diagnosis/diagnoses of Moderate protein-calorie malnutrition.    This patient is being managed with:   Diet Soft-  Low Sodium  Supplement Feeding Modality:  Oral  Ensure Pudding Cans or Servings Per Day:  3       Frequency:  Daily  Entered: Feb 16 2021  4:30PM    
This patient has been assessed with a concern for Malnutrition and has been determined to have a diagnosis/diagnoses of Moderate protein-calorie malnutrition.    This patient is being managed with:   Diet Soft-  Low Sodium  Supplement Feeding Modality:  Oral  Ensure Pudding Cans or Servings Per Day:  3       Frequency:  Daily  Entered: Feb 16 2021  4:30PM

## 2021-02-18 NOTE — DISCHARGE NOTE PROVIDER - HOSPITAL COURSE
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 pylori infection s/p triple therapy (10/2019), pancreatitis 2/2 choledocholithiasis s/p ERCP with stone extraction and plastic stent placement (11/2019, stent now removed), hematochezia 2/2 colonic AVMs s/p cauterization and hemorrhoids (11/2019), melena 2/2 duodenal ulcers s/p APC (4/2020), and recent admission from 8/19-8/24/20 for a R basicervical femoral neck fracture s/p multiple R hip procedures (Aug-Oct 2020) c/b post-op SVT that self resolved, presented with diffuse abdominal pain. MRCP showed acute gangrenous cholecystitis with large gallstone, also with cholecysto-choledochal fistula. ERCP performed, showed Mirizzi syndrome, s/p biliary stent placement on 2/9. BCx grew polymicrobial bacteremia, MDRO E. coli - KPC. Completed a course of Vabomere per ID recs. Repeat BCx grew strep constellatus, continued on ceftriaxone to complete a 14 day course (end date: 2/24/21). Patient medically stable for discharge to rehab. 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 pylori infection s/p triple therapy (10/2019), pancreatitis 2/2 choledocholithiasis s/p ERCP with stone extraction and plastic stent placement (11/2019, stent now removed), hematochezia 2/2 colonic AVMs s/p cauterization and hemorrhoids (11/2019), melena 2/2 duodenal ulcers s/p APC (4/2020), and recent admission from 8/19-8/24/20 for a R basicervical femoral neck fracture s/p multiple R hip procedures (Aug-Oct 2020) c/b post-op SVT that self resolved, presented with diffuse abdominal pain. MRCP showed acute gangrenous cholecystitis with large gallstone and cholecysto-choledochal fistula. ERCP performed, showed Mirizzi syndrome, s/p biliary stent placement on 2/9. Surgery consulted for acute gangrenous cholecystitis, recommended no surgical intervention. BCx grew polymicrobial bacteremia, MDRO E. coli - KPC. Completed a course of Vabomere per ID recs. Repeat BCx grew strep constellatus, continued on ceftriaxone to complete a 14 day course (end date: 2/24/21). Patient medically stable for discharge to rehab. 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 Patient was noted to have RUQ pain, leukocytosis, elevated ALP, bilirubin. MRCP demonstrated acute gangrenous cholecystitis with large gallstone and cholecysto-choledochal fistula. ERCP performed, showed Mirizzi syndrome and pus draining from CBD, s/p biliary stent placement on 2/9. No surgical intervention per trauma surgery. BCx grew polymicrobial bacteremia, MDRO E. coli - KPC. Completed a course of Vabomere per ID recs. Repeat BCx grew strep constellatus, continued on ceftriaxone to complete a 14 day course (end date: 2/24/21). Patient medically stable for discharge to rehab.       # Polymicrobial bacteremia 2/2 Cholangitis w/ gangrenous cholecystitis   - ESBL E.coli, Strep and Klebsiella isolated on Bcx   - S/p 10 days of Vabomere   - On CTX, continue until 2/24   - S/p ERCP w/ stent placement  - F/u GI OP     # Etoh/WALL cirrhosis   - EV: Hx of variceal bleed s/p banding 2018   - HE: Hx, none during admission, on Lactulose   - Ascites: Cont Lasix

## 2021-02-18 NOTE — PROGRESS NOTE ADULT - PROBLEM SELECTOR PROBLEM 7
Liver cirrhosis
Dementia
Liver cirrhosis

## 2021-02-18 NOTE — PROGRESS NOTE ADULT - PROBLEM SELECTOR PROBLEM 5
Dark stools
SOB (shortness of breath)

## 2021-02-18 NOTE — PROGRESS NOTE ADULT - SUBJECTIVE AND OBJECTIVE BOX
PROGRESS NOTE:   Authored by Gorge Middleton MD  PGY-1, Internal Medicine  Pager [facility] [phone], J [id number]     Patient is a 80y old  Male who presents with a chief complaint of abdominal pain, emesis, dark stools (17 Feb 2021 07:44)      SUBJECTIVE / OVERNIGHT EVENTS: No events overnight.    ADDITIONAL REVIEW OF SYSTEMS: Denies fevers, chills, n/v.    MEDICATIONS  (STANDING):  ascorbic acid 500 milliGRAM(s) Oral daily  calcium carbonate 1250 mG  + Vitamin D (OsCal 500 + D) 1 Tablet(s) Oral daily  cefTRIAXone   IVPB 2000 milliGRAM(s) IV Intermittent every 24 hours  furosemide    Tablet 20 milliGRAM(s) Oral daily  gabapentin 100 milliGRAM(s) Oral three times a day  influenza   Vaccine 0.5 milliLiter(s) IntraMuscular once  lactulose Syrup 30 Gram(s) Oral two times a day  lidocaine   Patch 1 Patch Transdermal daily  multivitamin 1 Tablet(s) Oral daily  pantoprazole    Tablet 40 milliGRAM(s) Oral before breakfast  polyethylene glycol 3350 17 Gram(s) Oral daily  propranolol 10 milliGRAM(s) Oral two times a day  senna 2 Tablet(s) Oral at bedtime    MEDICATIONS  (PRN):  acetaminophen   Tablet .. 650 milliGRAM(s) Oral every 8 hours PRN Temp greater or equal to 38C (100.4F), Mild Pain (1 - 3), Moderate Pain (4 - 6)  bisacodyl Suppository 10 milliGRAM(s) Rectal daily PRN Constipation      CAPILLARY BLOOD GLUCOSE        I&O's Summary    17 Feb 2021 07:01  -  18 Feb 2021 07:00  --------------------------------------------------------  IN: 100 mL / OUT: 1150 mL / NET: -1050 mL        PHYSICAL EXAM:  Vital Signs Last 24 Hrs  T(C): 36.9 (18 Feb 2021 05:01), Max: 37.2 (17 Feb 2021 21:12)  T(F): 98.4 (18 Feb 2021 05:01), Max: 98.9 (17 Feb 2021 21:12)  HR: 60 (18 Feb 2021 07:23) (60 - 64)  BP: 119/56 (18 Feb 2021 05:01) (119/56 - 146/75)  BP(mean): --  RR: 18 (18 Feb 2021 05:01) (18 - 18)  SpO2: 97% (18 Feb 2021 05:01) (97% - 100%)    CONSTITUTIONAL: NAD, lying in bed comfortably  RESPIRATORY: Normal respiratory effort; CTABL  CARDIOVASCULAR: Regular rate and rhythm, normal S1 and S2, no murmur/rub/gallop  ABDOMEN: Soft, nondistended, mild diffuse tenderness to palpation, normoactive bowel sounds, no rebound/guarding  MUSCLOSKELETAL: no joint swelling or tenderness to palpation, FROM all extremities  NEURO: AAOx2-3, full and equal strength all extremities   EXTREMITIES: no pedal edema    LABS:                        11.2   7.00  )-----------( 226      ( 17 Feb 2021 08:11 )             36.2     02-17    135  |  101  |  17  ----------------------------<  94  4.1   |  26  |  1.00    Ca    8.9      17 Feb 2021 08:11  Phos  2.8     02-17  Mg     1.7     02-17    TPro  6.3  /  Alb  2.2<L>  /  TBili  3.3<H>  /  DBili  x   /  AST  56<H>  /  ALT  21  /  AlkPhos  479<H>  02-17    PT/INR - ( 17 Feb 2021 08:11 )   PT: 18.1 sec;   INR: 1.62 ratio

## 2021-02-18 NOTE — PROGRESS NOTE ADULT - PROBLEM SELECTOR PLAN 8
- DVT ppx: Lovenox  - Diet: DASH/TLC  - Dispo: pending medical optimization  - GOC: spoke to wife regarding GOC, stated pt is FULL CODE    #HTN:  - not on any home antihypertensives aside from propanolol 10mg BID (for varices)  - continue to monitor BP    #Dementia  As per wife, AAOx2-3 with some confusion and memory issues at baseline.  - currently AAOx2, answering questions appropriately, no change in mental status as per wife  - if worsens, may be 2/2 infection vs hepatic encephalopathy (though ammonia wnl on admission)  - continue to monitor mental status
Hx of HTN  - not on any home antihypertensives aside from propanolol 10mg BID (for varices)  - continue to monitor BP
- DVT ppx: Lovenox  - Diet: DASH/TLC  - Dispo: pending medical optimization  - GOC: spoke to wife regarding GOC, stated pt is FULL CODE    #HTN:  - not on any home antihypertensives aside from propanolol 10mg BID (for varices)  - continue to monitor BP    #Dementia  As per wife, AAOx2-3 with some confusion and memory issues at baseline.  - currently AAOx2, answering questions appropriately, no change in mental status as per wife  - if worsens, may be 2/2 infection vs hepatic encephalopathy (though ammonia wnl on admission)  - continue to monitor mental status
- DVT ppx: Lovenox  - Diet: DASH/TLC  - Dispo: pending medical optimization  - GOC: spoke to wife regarding GOC, stated pt is FULL CODE    #HTN:  - not on any home antihypertensives aside from propanolol 10mg BID (for varices)  - continue to monitor BP    #Dementia  As per wife, AAOx2-3 with some confusion and memory issues at baseline.  - currently AAOx2, answering questions appropriately, no change in mental status as per wife  - if worsens, may be 2/2 infection vs hepatic encephalopathy (though ammonia wnl on admission)  - continue to monitor mental status

## 2021-02-18 NOTE — PROGRESS NOTE ADULT - PROBLEM SELECTOR PLAN 2
Pt presented with diffuse mild abdominal pain, worse in RUQ, 1x NBNB emesis, leukocytosis, subjective fevers/chills. Pt has known to have extensive GI hx.  - CT A/P w/ IV con (2/6/21): Mild intrahepatic biliary ductal dilation and irregular gallbladder wall  -MRCP showing acute gangrenous cholecystitis with large stone in neck compressing common bile duct (Mirrizzi syndrome). Now S/p ERCP with stent

## 2021-02-18 NOTE — PROGRESS NOTE ADULT - ASSESSMENT
80 year old male with cirrhosis (EtOH/ WALL), Dementia, CKD, prior right hip arthroplasty, previous ESBL E. coli UTIs presenting with abd pain and vomiting. CT A/P with mild biliary dilatation planned for MRCP. Also with RUQ tenderness on exam and elevated bili concerning for cholangitis/ cholecystitis  Blood culture positive for MDRO E. coli - [facility]  MRCP with acute gangrenous cholecystitis with large gallstone, also with cholecysto-choledochal fistula.  ERCP with Mirizzi's syndrome  Now s/p biliary stent placement 2/9  Repeat blood culture 2/9 with Strep constellatus, with negative repeat cxs 2/11  Overall, Polymicrobial bacteremia, cholecystitis, strep, MDRO infection    Recommend:  -Completed 10 days of Vabomere for the MDRO bacteremia  -Continue ceftriaxone 2 grams IV q 24 hours to complete a 14 day course on 2/24/2021.  -Monitor for signs breakthrough infection    Mateus Taylor MD  Pager [phone]  After 5pm/weekends call [phone]

## 2021-02-18 NOTE — PROGRESS NOTE ADULT - PROVIDER SPECIALTY LIST ADULT
Gastroenterology
Surgery
Anesthesia
Dental
Gastroenterology
Infectious Disease
Internal Medicine
Gastroenterology
Infectious Disease
Infectious Disease
Gastroenterology
Infectious Disease
Infectious Disease
Internal Medicine

## 2021-02-18 NOTE — PROGRESS NOTE ADULT - REASON FOR ADMISSION
abdominal pain, emesis, dark stools

## 2021-02-18 NOTE — DISCHARGE NOTE PROVIDER - DETAILS OF MALNUTRITION DIAGNOSIS/DIAGNOSES
This patient has been assessed with a concern for Malnutrition and was treated during this hospitalization for the following Nutrition diagnosis/diagnoses:     -  02/16/2021: Moderate protein-calorie malnutrition   This patient has been assessed with a concern for Malnutrition and was treated during this hospitalization for the following Nutrition diagnosis/diagnoses:     -  02/16/2021: Moderate protein-calorie malnutrition    This patient has been assessed with a concern for Malnutrition and was treated during this hospitalization for the following Nutrition diagnosis/diagnoses:     -  02/16/2021: Moderate protein-calorie malnutrition   This patient has been assessed with a concern for Malnutrition and was treated during this hospitalization for the following Nutrition diagnosis/diagnoses:     -  02/16/2021: Moderate protein-calorie malnutrition    This patient has been assessed with a concern for Malnutrition and was treated during this hospitalization for the following Nutrition diagnosis/diagnoses:     -  02/16/2021: Moderate protein-calorie malnutrition    This patient has been assessed with a concern for Malnutrition and was treated during this hospitalization for the following Nutrition diagnosis/diagnoses:     -  02/16/2021: Moderate protein-calorie malnutrition

## 2021-02-18 NOTE — DISCHARGE NOTE PROVIDER - CARE PROVIDER_API CALL
Anthony Mauricio  INTERNAL MEDICINE  [address] [place] [address]  Phone: [phone]  Fax: [phone]  Follow Up Time:     Richie Mcgraw)  Internal Medicine  [address] [place] [id number]  Phone: [phone]  Fax: [phone]  Follow Up Time:

## 2021-02-18 NOTE — DISCHARGE NOTE PROVIDER - NSDCCPCAREPLAN_GEN_ALL_CORE_FT
PRINCIPAL DISCHARGE DIAGNOSIS  Diagnosis: Bacteremia  Assessment and Plan of Treatment: You were found to have bacteria growing in your blood. You will continue with IV antibiotic ceftriaxone until 2/24/21 to complete a 14 day course.      SECONDARY DISCHARGE DIAGNOSES  Diagnosis: Acute cholecystitis  Assessment and Plan of Treatment: Acute cholecystitis

## 2021-02-18 NOTE — PROGRESS NOTE ADULT - PROBLEM SELECTOR PROBLEM 9
Discharge planning issues
Prophylactic measure

## 2021-02-18 NOTE — PROGRESS NOTE ADULT - PROBLEM SELECTOR PLAN 3
Pt presented with leukocytosis to WBC 13.49k, subjective fevers/chills/weakness, and +UA, likely 2/2 UTI  - s/p CTX 1g x1 and Zosyn x1 in ED  - Zosyn (2/6/21- 2/7/21 ); previous UCx in the past grew ESBL E.coli sensitive to Zosyn  - On ceftriaxone now

## 2021-02-18 NOTE — PROGRESS NOTE ADULT - PROBLEM SELECTOR PROBLEM 8
HTN (hypertension)
Prophylactic measure

## 2021-02-18 NOTE — DISCHARGE NOTE PROVIDER - NSDCMRMEDTOKEN_GEN_ALL_CORE_FT
ascorbic acid 500 mg oral tablet: 1 tab(s) orally once a day  calcium-vitamin D 500 mg-200 intl units (5 mcg) oral tablet: 1 tab(s) orally once a day  cefTRIAXone: 2 gram(s) intravenous once a day through 2/24  ferrous sulfate 325 mg (65 mg elemental iron) oral tablet: 1 tab(s) orally once a day  furosemide 20 mg oral tablet: 1 tab(s) orally once a day  gabapentin 100 mg oral capsule: 1 cap(s) orally 3 times a day  lactulose 10 g/15 mL oral syrup: 45 milliliter(s) orally 2 times a day  Multiple Vitamins oral tablet: 1 tab(s) orally once a day  pantoprazole 40 mg oral delayed release tablet: 1 tab(s) orally once a day (before a meal)  polyethylene glycol 3350 oral powder for reconstitution: 17 gram(s) orally once a day  propranolol 10 mg oral tablet: 1 tab(s) orally 2 times a day  senna oral tablet: 2 tab(s) orally once a day (at bedtime)

## 2021-02-18 NOTE — DISCHARGE NOTE NURSING/CASE MANAGEMENT/SOCIAL WORK - PATIENT PORTAL LINK FT
You can access the FollowMyHealth Patient Portal offered by [facility] by registering at the following website: [email or web address]. By joining [facility]’s FollowMyHealth portal, you will also be able to view your health information using other applications (apps) compatible with our system.

## 2021-02-18 NOTE — PROGRESS NOTE ADULT - ASSESSMENT
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 Now S/p ERCP with stent placed.

## 2021-02-18 NOTE — PROGRESS NOTE ADULT - ATTENDING COMMENTS
80 y.o. M w/ a hx of cirrhosis c/b EV s/p banding, HTN currently hospitalized for MDRO E.coli,  Klebsiella, Strep anginosus bacteremia 2/2 gangrenous cholecystitis, cholangitis currently on Vabomere now s/p ERCP w/ stenting.     # MDRO polymicrobial bacteremia 2/2 acute cholangitis: S/p ERCP & stent w/ GI. Cont. CTX. ID, GI following. Repeat BCx NGTD, TTE w/o vegetations.  # Strep constellatus bacteremia: TTE neg, no dental infection noted, f/u OP. Abx as above.  # Cirrhosis: Cont Lactulose, Propanolol. Hbg stable.  # hypophosphatemia; resolved with supplement , monitor BMP  # Dispo: Medically stable for d/c to rehab    I have personally seen, examined and participated in the care of this patient. I have reviewed all pertinent clinical information, including history, physical exam, plan and the resident's note and agree except as noted.

## 2021-02-19 ENCOUNTER — NON-APPOINTMENT (OUTPATIENT)
Age: 81
End: 2021-02-19

## 2021-02-22 ENCOUNTER — NON-APPOINTMENT (OUTPATIENT)
Age: 81
End: 2021-02-22

## 2021-02-22 DIAGNOSIS — K83.09 OTHER CHOLANGITIS: ICD-10-CM

## 2021-02-23 ENCOUNTER — APPOINTMENT (OUTPATIENT)
Dept: DISASTER EMERGENCY | Facility: CLINIC | Age: 81
End: 2021-02-23

## 2021-02-23 PROBLEM — K85.90 ACUTE PANCREATITIS WITHOUT NECROSIS OR INFECTION, UNSPECIFIED: Chronic | Status: ACTIVE | Noted: 2021-02-06

## 2021-02-23 PROBLEM — K74.60 UNSPECIFIED CIRRHOSIS OF LIVER: Chronic | Status: ACTIVE | Noted: 2018-05-25

## 2021-02-23 PROBLEM — K92.1 MELENA: Chronic | Status: ACTIVE | Noted: 2021-02-06

## 2021-02-23 PROBLEM — K82.8 OTHER SPECIFIED DISEASES OF GALLBLADDER: Chronic | Status: ACTIVE | Noted: 2020-04-02

## 2021-02-23 PROBLEM — G61.0 GUILLAIN-BARRE SYNDROME: Chronic | Status: ACTIVE | Noted: 2018-05-25

## 2021-02-24 ENCOUNTER — NON-APPOINTMENT (OUTPATIENT)
Age: 81
End: 2021-02-24

## 2021-02-25 ENCOUNTER — NON-APPOINTMENT (OUTPATIENT)
Age: 81
End: 2021-02-25

## 2021-03-12 DIAGNOSIS — K70.30 ALCOHOLIC CIRRHOSIS OF LIVER W/OUT ASCITES: ICD-10-CM

## 2021-03-26 ENCOUNTER — INPATIENT (INPATIENT)
Facility: HOSPITAL | Age: 81
LOS: 6 days | Discharge: ANOTHER HOSPITAL | End: 2021-04-02
Attending: STUDENT IN AN ORGANIZED HEALTH CARE EDUCATION/TRAINING PROGRAM | Admitting: STUDENT IN AN ORGANIZED HEALTH CARE EDUCATION/TRAINING PROGRAM
Payer: MEDICARE

## 2021-03-26 VITALS
RESPIRATION RATE: 18 BRPM | OXYGEN SATURATION: 100 % | DIASTOLIC BLOOD PRESSURE: 57 MMHG | HEART RATE: 64 BPM | TEMPERATURE: 100 F | HEIGHT: 68 IN | SYSTOLIC BLOOD PRESSURE: 87 MMHG

## 2021-03-26 DIAGNOSIS — Z98.890 OTHER SPECIFIED POSTPROCEDURAL STATES: Chronic | ICD-10-CM

## 2021-03-26 DIAGNOSIS — N39.0 URINARY TRACT INFECTION, SITE NOT SPECIFIED: ICD-10-CM

## 2021-03-26 LAB
ALBUMIN SERPL ELPH-MCNC: 3.5 G/DL — SIGNIFICANT CHANGE UP (ref 3.3–5)
ALP SERPL-CCNC: 263 U/L — HIGH (ref 40–120)
ALT FLD-CCNC: 20 U/L — SIGNIFICANT CHANGE UP (ref 4–41)
ANION GAP SERPL CALC-SCNC: 13 MMOL/L — SIGNIFICANT CHANGE UP (ref 7–14)
APPEARANCE UR: ABNORMAL
APTT BLD: 34.8 SEC — SIGNIFICANT CHANGE UP (ref 27–36.3)
AST SERPL-CCNC: 37 U/L — SIGNIFICANT CHANGE UP (ref 4–40)
BASOPHILS # BLD AUTO: 0.08 K/UL — SIGNIFICANT CHANGE UP (ref 0–0.2)
BASOPHILS NFR BLD AUTO: 1.5 % — SIGNIFICANT CHANGE UP (ref 0–2)
BILIRUB SERPL-MCNC: 1.1 MG/DL — SIGNIFICANT CHANGE UP (ref 0.2–1.2)
BILIRUB UR-MCNC: NEGATIVE — SIGNIFICANT CHANGE UP
BLOOD GAS VENOUS COMPREHENSIVE RESULT: SIGNIFICANT CHANGE UP
BUN SERPL-MCNC: 30 MG/DL — HIGH (ref 7–23)
CALCIUM SERPL-MCNC: 9.5 MG/DL — SIGNIFICANT CHANGE UP (ref 8.4–10.5)
CHLORIDE SERPL-SCNC: 103 MMOL/L — SIGNIFICANT CHANGE UP (ref 98–107)
CO2 SERPL-SCNC: 24 MMOL/L — SIGNIFICANT CHANGE UP (ref 22–31)
COLOR SPEC: YELLOW — SIGNIFICANT CHANGE UP
CREAT SERPL-MCNC: 1.39 MG/DL — HIGH (ref 0.5–1.3)
DIFF PNL FLD: NEGATIVE — SIGNIFICANT CHANGE UP
EOSINOPHIL # BLD AUTO: 0.41 K/UL — SIGNIFICANT CHANGE UP (ref 0–0.5)
EOSINOPHIL NFR BLD AUTO: 7.5 % — HIGH (ref 0–6)
GLUCOSE SERPL-MCNC: 93 MG/DL — SIGNIFICANT CHANGE UP (ref 70–99)
GLUCOSE UR QL: NEGATIVE — SIGNIFICANT CHANGE UP
HCT VFR BLD CALC: 42.9 % — SIGNIFICANT CHANGE UP (ref 39–50)
HGB BLD-MCNC: 13.2 G/DL — SIGNIFICANT CHANGE UP (ref 13–17)
IANC: 2.79 K/UL — SIGNIFICANT CHANGE UP (ref 1.5–8.5)
IMM GRANULOCYTES NFR BLD AUTO: 0.4 % — SIGNIFICANT CHANGE UP (ref 0–1.5)
INR BLD: 1.46 RATIO — HIGH (ref 0.88–1.16)
KETONES UR-MCNC: NEGATIVE — SIGNIFICANT CHANGE UP
LEUKOCYTE ESTERASE UR-ACNC: ABNORMAL
LYMPHOCYTES # BLD AUTO: 1.61 K/UL — SIGNIFICANT CHANGE UP (ref 1–3.3)
LYMPHOCYTES # BLD AUTO: 29.4 % — SIGNIFICANT CHANGE UP (ref 13–44)
MAGNESIUM SERPL-MCNC: 1.7 MG/DL — SIGNIFICANT CHANGE UP (ref 1.6–2.6)
MCHC RBC-ENTMCNC: 25.5 PG — LOW (ref 27–34)
MCHC RBC-ENTMCNC: 30.8 GM/DL — LOW (ref 32–36)
MCV RBC AUTO: 82.8 FL — SIGNIFICANT CHANGE UP (ref 80–100)
MONOCYTES # BLD AUTO: 0.57 K/UL — SIGNIFICANT CHANGE UP (ref 0–0.9)
MONOCYTES NFR BLD AUTO: 10.4 % — SIGNIFICANT CHANGE UP (ref 2–14)
NEUTROPHILS # BLD AUTO: 2.79 K/UL — SIGNIFICANT CHANGE UP (ref 1.8–7.4)
NEUTROPHILS NFR BLD AUTO: 50.8 % — SIGNIFICANT CHANGE UP (ref 43–77)
NITRITE UR-MCNC: NEGATIVE — SIGNIFICANT CHANGE UP
NRBC # BLD: 0 /100 WBCS — SIGNIFICANT CHANGE UP
NRBC # FLD: 0 K/UL — SIGNIFICANT CHANGE UP
OB PNL STL: NEGATIVE — SIGNIFICANT CHANGE UP
PH UR: 6 — SIGNIFICANT CHANGE UP (ref 5–8)
PHOSPHATE SERPL-MCNC: 3.3 MG/DL — SIGNIFICANT CHANGE UP (ref 2.5–4.5)
PLATELET # BLD AUTO: 165 K/UL — SIGNIFICANT CHANGE UP (ref 150–400)
POTASSIUM SERPL-MCNC: 4.4 MMOL/L — SIGNIFICANT CHANGE UP (ref 3.5–5.3)
POTASSIUM SERPL-SCNC: 4.4 MMOL/L — SIGNIFICANT CHANGE UP (ref 3.5–5.3)
PROT SERPL-MCNC: 7.6 G/DL — SIGNIFICANT CHANGE UP (ref 6–8.3)
PROT UR-MCNC: ABNORMAL
PROTHROM AB SERPL-ACNC: 16.4 SEC — HIGH (ref 10.6–13.6)
RBC # BLD: 5.18 M/UL — SIGNIFICANT CHANGE UP (ref 4.2–5.8)
RBC # FLD: 17.9 % — HIGH (ref 10.3–14.5)
SODIUM SERPL-SCNC: 140 MMOL/L — SIGNIFICANT CHANGE UP (ref 135–145)
SP GR SPEC: 1.02 — SIGNIFICANT CHANGE UP (ref 1.01–1.02)
TROPONIN T, HIGH SENSITIVITY RESULT: 21 NG/L — SIGNIFICANT CHANGE UP
UROBILINOGEN FLD QL: SIGNIFICANT CHANGE UP
WBC # BLD: 5.48 K/UL — SIGNIFICANT CHANGE UP (ref 3.8–10.5)
WBC # FLD AUTO: 5.48 K/UL — SIGNIFICANT CHANGE UP (ref 3.8–10.5)

## 2021-03-26 PROCEDURE — 71045 X-RAY EXAM CHEST 1 VIEW: CPT | Mod: 26

## 2021-03-26 PROCEDURE — 72170 X-RAY EXAM OF PELVIS: CPT | Mod: 26

## 2021-03-26 PROCEDURE — 99285 EMERGENCY DEPT VISIT HI MDM: CPT

## 2021-03-26 RX ORDER — ERTAPENEM SODIUM 1 G/1
1000 INJECTION, POWDER, LYOPHILIZED, FOR SOLUTION INTRAMUSCULAR; INTRAVENOUS EVERY 24 HOURS
Refills: 0 | Status: COMPLETED | OUTPATIENT
Start: 2021-03-26 | End: 2021-04-01

## 2021-03-26 RX ORDER — ASCORBIC ACID 60 MG
500 TABLET,CHEWABLE ORAL DAILY
Refills: 0 | Status: DISCONTINUED | OUTPATIENT
Start: 2021-03-26 | End: 2021-04-02

## 2021-03-26 RX ORDER — FUROSEMIDE 40 MG
20 TABLET ORAL DAILY
Refills: 0 | Status: DISCONTINUED | OUTPATIENT
Start: 2021-03-26 | End: 2021-03-27

## 2021-03-26 RX ORDER — GABAPENTIN 400 MG/1
100 CAPSULE ORAL THREE TIMES A DAY
Refills: 0 | Status: DISCONTINUED | OUTPATIENT
Start: 2021-03-26 | End: 2021-04-02

## 2021-03-26 RX ORDER — FERROUS SULFATE 325(65) MG
325 TABLET ORAL DAILY
Refills: 0 | Status: DISCONTINUED | OUTPATIENT
Start: 2021-03-26 | End: 2021-04-02

## 2021-03-26 RX ORDER — LACTULOSE 10 G/15ML
30 SOLUTION ORAL
Refills: 0 | Status: DISCONTINUED | OUTPATIENT
Start: 2021-03-26 | End: 2021-04-02

## 2021-03-26 RX ORDER — SODIUM CHLORIDE 9 MG/ML
1000 INJECTION INTRAMUSCULAR; INTRAVENOUS; SUBCUTANEOUS ONCE
Refills: 0 | Status: COMPLETED | OUTPATIENT
Start: 2021-03-26 | End: 2021-03-26

## 2021-03-26 RX ORDER — HEPARIN SODIUM 5000 [USP'U]/ML
5000 INJECTION INTRAVENOUS; SUBCUTANEOUS EVERY 8 HOURS
Refills: 0 | Status: DISCONTINUED | OUTPATIENT
Start: 2021-03-26 | End: 2021-03-29

## 2021-03-26 RX ORDER — PANTOPRAZOLE SODIUM 20 MG/1
40 TABLET, DELAYED RELEASE ORAL
Refills: 0 | Status: DISCONTINUED | OUTPATIENT
Start: 2021-03-26 | End: 2021-04-02

## 2021-03-26 RX ADMIN — SODIUM CHLORIDE 1000 MILLILITER(S): 9 INJECTION INTRAMUSCULAR; INTRAVENOUS; SUBCUTANEOUS at 17:53

## 2021-03-26 RX ADMIN — ERTAPENEM SODIUM 120 MILLIGRAM(S): 1 INJECTION, POWDER, LYOPHILIZED, FOR SOLUTION INTRAMUSCULAR; INTRAVENOUS at 21:34

## 2021-03-26 NOTE — H&P ADULT - PROBLEM SELECTOR PLAN 1
Patient with history of ESBL E. Coli UTI, most recently in 2/2021  Patient has +UA with KAY, worsening weakness and fatigue and decreased appetite  No signs of sepsis, no leukocytosis, patient is hemodynamically stable and afebrile  Will c/w Ertapenem for now  F/u urine cultures, blood cultures -Patient with history of ESBL E. Coli UTI, most recently in 2/2021  -Patient has +UA with KAY, worsening weakness and fatigue and decreased appetite.   - No signs of sepsis, no leukocytosis, patient is hemodynamically stable and afebrile, although +orthostatic hypotension  - Will c/w Ertapenem for now  - Starting 75 cc/hr NS given decreased PO intake, KAY, and +orthostatic hypotension  - F/u urine cultures, blood cultures  - Monitor for fevers, hemodynamic instability -Patient with history of ESBL E. Coli UTI, most recently in 2/2021  -Patient has +UA with KAY, worsening weakness and fatigue and decreased appetite.   - No signs of sepsis, no leukocytosis, patient is hemodynamically stable and afebrile, although +orthostatic hypotension  - Will c/w Ertapenem for now  - F/u urine cultures, blood cultures  - Monitor for fevers, hemodynamic instability -Patient with history of ESBL E. Coli UTI, most recently in 2/2021  -Patient has +UA with KAY, worsening weakness and fatigue and decreased appetite.   - No signs of sepsis, no leukocytosis, patient is hemodynamically stable and afebrile, although +orthostatic hypotension. Initial BP was recorded as 87/57, subsequent SBP 140s.  - Will c/w Ertapenem for now  - F/u urine cultures, blood cultures  - Monitor for fevers, hemodynamic instability

## 2021-03-26 NOTE — H&P ADULT - NSICDXPASTMEDICALHX_GEN_ALL_CORE_FT
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

## 2021-03-26 NOTE — H&P ADULT - PROBLEM SELECTOR PLAN 5
Per wife, patient is baseline AAOx2-3.   Now AAOx3 on admission without any acute changes in mental status. Patient not on any home anti-hypertensives other than propranolol   - Will continue to monitor, BP 140s/80s Sinus bradycardia to 50s  EKG shows known RBBB, will continue to monitor Sinus bradycardia to 50s  EKG as above, CTM

## 2021-03-26 NOTE — H&P ADULT - PROBLEM SELECTOR PLAN 9
DVT: heparin subQ  Diet: Regular, no pork Per prior admission, patient is FULL CODE. DVT: heparin subQ  Diet: Regular, no pork  Per prior admission, patient is FULL CODE.

## 2021-03-26 NOTE — ED PROVIDER NOTE - OBJECTIVE STATEMENT
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 Pt's states that pt recently d/c from rehab center 5 days ago. Since then has had increased weakness. Pt's wife states that in rehab pt was able to walk with a walker and minimal assistance however since being home pt is unable to stand or sit with out assistance. Pt wife repost dark stools and worried about anemia and GI bleed. Pt's report that pt has been lightheaded during his rehab admission. Pt reports diarrhea since being on his lactulose and SOB. Denies CP, abd pain, dysuria, visual changes 82 yo M p/w lightheadedness. 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 Pt's states that pt recently d/c from rehab center 5 days ago. Since then has had increased weakness. Pt's wife states that in rehab pt was able to walk with a walker and minimal assistance however since being home pt is unable to stand or sit without assistance. Pt wife reports dark stools and worried about anemia and GI bleed. Pt's report that pt has been lightheaded during his rehab admission. Pt reports diarrhea since being on his lactulose and SOB. Denies CP, abd pain, dysuria, visual changes

## 2021-03-26 NOTE — ED ADULT TRIAGE NOTE - CHIEF COMPLAINT QUOTE
Arrives from home reports weakness and dizziness for several days. Pt noted to be poor historian. States unable to ambulate x 2 days

## 2021-03-26 NOTE — H&P ADULT - PROBLEM SELECTOR PLAN 4
Patient Patient not on any home anti-hypertensives other than propranolol   Will continue to monitor, BP 140s/80s - Patient has history of alcoholic cirrhosis/WALL c/b variceal s/p banding (8/2018) and hepatic encephalopathy (several years ago).   -  (was >400 during previous admission) with normal AST/ALT, normal TBili. Patient has mild tenderness to palpation in RUQ, will continue to monitor. Consider RUQ u/s if new or worsening symptoms  - c/w home lactulose 30g BID  - c/w home propanolol 10mg BID (for varices)  - c/w home furosemide 20mg daily

## 2021-03-26 NOTE — H&P ADULT - NSHPLABSRESULTS_GEN_ALL_CORE
13.2   5.48  )-----------( 165      ( 26 Mar 2021 18:18 )             42.9           140  |  103  |  30<H>  ----------------------------<  93  4.4   |  24  |  1.39<H>    Ca    9.5      26 Mar 2021 18:18  Phos  3.3       Mg     1.7         TPro  7.6  /  Alb  3.5  /  TBili  1.1  /  DBili  x   /  AST  37  /  ALT  20  /  AlkPhos  263<H>                Urinalysis Basic - ( 26 Mar 2021 19:08 )    Color: Yellow / Appearance: Slightly Turbid / S.020 / pH: x  Gluc: x / Ketone: Negative  / Bili: Negative / Urobili: <2 mg/dL   Blood: x / Protein: Trace / Nitrite: Negative   Leuk Esterase: Large / RBC: 1 /HPF / WBC 89 /HPF   Sq Epi: x / Non Sq Epi: 0 /HPF / Bacteria: Many        PT/INR - ( 26 Mar 2021 18:18 )   PT: 16.4 sec;   INR: 1.46 ratio         PTT - ( 26 Mar 2021 18:18 )  PTT:34.8 sec    Lactate Trend            CAPILLARY BLOOD GLUCOSE      POCT Blood Glucose.: 85 mg/dL (26 Mar 2021 17:21) 13.2   5.48  )-----------( 165      ( 26 Mar 2021 18:18 )             42.9         140  |  103  |  30<H>  ----------------------------<  93  4.4   |  24  |  1.39<H>    Ca    9.5      26 Mar 2021 18:18  Phos  3.3       Mg     1.7         TPro  7.6  /  Alb  3.5  /  TBili  1.1  /  DBili  x   /  AST  37  /  ALT  20  /  AlkPhos  263<H>      PT/INR - ( 26 Mar 2021 18:18 )   PT: 16.4 sec;   INR: 1.46 ratio    PTT - ( 26 Mar 2021 18:18 )  PTT:34.8 sec    18:52 - VBG - pH: 7.33  | pCO2: 51    | pO2: <24   | Lactate: 1.4      Troponin T, High Sensitivity Result: 21 ng/L (21 @ 18:18)        Urinalysis Basic - ( 26 Mar 2021 19:08 )  Color: Yellow / Appearance: Slightly Turbid / S.020 / pH: x  Gluc: x / Ketone: Negative  / Bili: Negative / Urobili: <2 mg/dL   Blood: x / Protein: Trace / Nitrite: Negative   Leuk Esterase: Large / RBC: 1 /HPF / WBC 89 /HPF   Sq Epi: x / Non Sq Epi: 0 /HPF / Bacteria: Many    POCT Blood Glucose.: 85 mg/dL (26 Mar 2021 17:21)    < from: Xray Pelvis AP only (21 @ 18:36) >  Impression: Patient is status post right hip arthroplasty with partiallyimaged femoral component and acetabular augmentation screw. There is no evidence of acute fracture or dislocation. There is mild left hip arthrosis. There is lower lumbar spondylosis.  < end of copied text >    CXR personally reviewed - slight increase in R hilar vasculature in comparison to prior, no focal consolidations 13.2   5.48  )-----------( 165      ( 26 Mar 2021 18:18 )             42.9   Occult Blood, Feces: Negative (21 @ 18:52)        140  |  103  |  30<H>  ----------------------------<  93  4.4   |  24  |  1.39<H>    Ca    9.5      26 Mar 2021 18:18  Phos  3.3       Mg     1.7         TPro  7.6  /  Alb  3.5  /  TBili  1.1  /  DBili  x   /  AST  37  /  ALT  20  /  AlkPhos  263<H>      PT/INR - ( 26 Mar 2021 18:18 )   PT: 16.4 sec;   INR: 1.46 ratio    PTT - ( 26 Mar 2021 18:18 )  PTT:34.8 sec    18:52 - VBG - pH: 7.33  | pCO2: 51    | pO2: <24   | Lactate: 1.4      Troponin T, High Sensitivity Result: 21 ng/L (21 @ 18:18)        Urinalysis Basic - ( 26 Mar 2021 19:08 )  Color: Yellow / Appearance: Slightly Turbid / S.020 / pH: x  Gluc: x / Ketone: Negative  / Bili: Negative / Urobili: <2 mg/dL   Blood: x / Protein: Trace / Nitrite: Negative   Leuk Esterase: Large / RBC: 1 /HPF / WBC 89 /HPF   Sq Epi: x / Non Sq Epi: 0 /HPF / Bacteria: Many    POCT Blood Glucose.: 85 mg/dL (26 Mar 2021 17:21)    < from: Xray Pelvis AP only (21 @ 18:36) >  Impression: Patient is status post right hip arthroplasty with partiallyimaged femoral component and acetabular augmentation screw. There is no evidence of acute fracture or dislocation. There is mild left hip arthrosis. There is lower lumbar spondylosis.  < end of copied text >    CXR personally reviewed - slight increase in R hilar vasculature in comparison to prior, no focal consolidations    EKG personally reviewed - 55bpm sinus bradycardia, TWI III, aVF (new in aVF only), V1-V3; Q in V1-V3 (new Q in V2); QTc 441ms

## 2021-03-26 NOTE — ED PROVIDER NOTE - CLINICAL SUMMARY MEDICAL DECISION MAKING FREE TEXT BOX
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 Electrolyte abn vs cardiac vs infectious vs anemia. will get cbc,cmp, mg, phos, blood cx, cxr, xr pelvis, ua, urine cx, orthostatic vitals 80 yo M c very complex PMHx who p/w weakness and lightheadedness since returning from rehab - worse than at [facility]. No focal findings on exam, is chronically ill appearing but in NAD. No neuro deficits. Electrolyte abn vs cardiac vs infectious vs anemia. will get cbc,cmp, mg, phos, blood cx, cxr, xr pelvis, ua, urine cx, orthostatic vitals. May require admission.

## 2021-03-26 NOTE — H&P ADULT - PROBLEM SELECTOR PLAN 7
DVT: heparin subQ  Diet: Regular, no pork Per wife, patient is baseline AAOx2-3.   Now AAOx3 on admission without any acute changes in mental status. Patient with arthritis with mild chronic joint pain   - Will give Tylenol PRN q8h, max 2000 mg/day given cirrhosis

## 2021-03-26 NOTE — ED PROVIDER NOTE - CROS ED ROS STATEMENT
all other ROS negative except as per HPI

## 2021-03-26 NOTE — H&P ADULT - ATTENDING COMMENTS
Agree with resident H&P and plan as edited above.     81M w/extensive PMH, recent admit for bacteremia w/MDRO, presenting with weakness. (+)UA, treating for UTI w/carbapenem, given history. (+)orthostatics, s/p 1L IVF, would repeat today, may need to decrease BB/diuretic dosing. Monitor stool counts given cirrhosis. Uses rollator at home, no falls, will place on fall precautions, PT consult.

## 2021-03-26 NOTE — ED PROVIDER NOTE - PMH
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

## 2021-03-26 NOTE — H&P ADULT - PROBLEM SELECTOR PLAN 8
Transitions of Care Status:  1.  Name of PCP:  2.  PCP Contacted on Admission: [ ] Y    [ ] N    3.  PCP contacted at Discharge: [ ] Y    [ ] N    [ ] N/A  4.  Post-Discharge Appointment Date and Location:  5.  Summary of Handoff given to PCP: Per prior admission, patient is FULL CODE. Per wife, patient is baseline AAOx2-3.   Now AAOx3 on admission without any acute changes in mental status.

## 2021-03-26 NOTE — H&P ADULT - PROBLEM SELECTOR PLAN 2
Patient's wife reports recent dark stools with history of melena and hematochezia, had also noted this on prior admission  - Hgb at or above baseline at 13.2, FOBT negative  - CTM for melena, hematochezia, and trend CBCs  - Patient is on daily iron supplement which may cause dark stools  - C/w pantoprazole 40 mg qD  - Titrate home lactulose to 2-3 BM per day Patient with KAY on CKD (Cr 1.39).   Recent baseline 1.0-1.3.  Will treat as above

## 2021-03-26 NOTE — H&P ADULT - NSHPPHYSICALEXAM_GEN_ALL_CORE
PHYSICAL EXAM:  T(C): 36.5 (03-26-21 @ 20:33), Max: 37.7 (03-26-21 @ 16:36)  HR: 50 (03-26-21 @ 20:33) (50 - 64)  BP: 144/80 (03-26-21 @ 20:33) (87/57 - 145/75)  RR: 18 (03-26-21 @ 20:33) (18 - 19)  SpO2: 100% (03-26-21 @ 20:33) (100% - 100%)  GENERAL: NAD, lying in bed  HEAD:  Atraumatic, Normocephalic  EYES: EOMI, conjunctiva and sclera clear  NECK: Supple  CHEST/LUNG: Clear to auscultation bilaterally; No wheeze  HEART: Regular rate and rhythm; No murmurs, rubs, or gallops  ABDOMEN: Soft, +mildly tender to palpation in RUQ, Nondistended;   EXTREMITIES:   No clubbing, cyanosis, or edema  PSYCH: AAOx3  NEUROLOGY: non-focal  SKIN: No rashes or lesions PHYSICAL EXAM:  T(C): 36.5 (03-26-21 @ 20:33), Max: 37.7 (03-26-21 @ 16:36)  HR: 50 (03-26-21 @ 20:33) (50 - 64)  BP: 144/80 (03-26-21 @ 20:33) (87/57 - 145/75)  RR: 18 (03-26-21 @ 20:33) (18 - 19)  SpO2: 100% (03-26-21 @ 20:33) (100% - 100%)  GENERAL: NAD, lying in bed  HEAD:  Atraumatic, Normocephalic  EYES: EOMI, conjunctiva and sclera clear  NECK: Supple  CHEST/LUNG: Clear to auscultation bilaterally; No wheeze  HEART: Regular rate and rhythm; No murmurs, rubs, or gallops  ABDOMEN: Soft, +mildly tender to palpation in RUQ, Nondistended;   EXTREMITIES: No clubbing, cyanosis, or edema  PSYCH: AAOx3  NEUROLOGY: non-focal  SKIN: No rashes or lesions PHYSICAL EXAM:  T(C): 36.5 (03-26-21 @ 20:33), Max: 37.7 (03-26-21 @ 16:36)  HR: 50 (03-26-21 @ 20:33) (50 - 64)  BP: 144/80 (03-26-21 @ 20:33) (87/57 - 145/75)  RR: 18 (03-26-21 @ 20:33) (18 - 19)  SpO2: 100% (03-26-21 @ 20:33) (100% - 100%)  GENERAL: NAD, lying in bed  HEAD:  Atraumatic, Normocephalic  EYES: EOMI, conjunctiva and sclera clear  NECK: Supple  CHEST/LUNG: Clear to auscultation bilaterally; No wheeze  HEART: S1S2 Regular rate and rhythm; No murmurs, rubs, or gallops  ABDOMEN: Soft, +mildly tender to palpation in RUQ, Nondistended; (+)BS; [attending exam TTP RLQ only]  EXTREMITIES: No clubbing, cyanosis, or edema  PSYCH: AAOx3  NEUROLOGY: non-focal, no asterixis, 4/5 strength   SKIN: No rashes or lesions

## 2021-03-26 NOTE — H&P ADULT - PROBLEM SELECTOR PLAN 3
Patient has history of alcoholic cirrhosis/WALL c/b variceal s/p banding (8/2018) and hepatic encephalopathy (several years ago).   - c/w home lactulose 30g BID  - c/w home propanolol 10mg BID (for varices)  - c/w home furosemide 20mg daily - Patient has history of alcoholic cirrhosis/WALL c/b variceal s/p banding (8/2018) and hepatic encephalopathy (several years ago).   - Patient has mild tenderness to palpation in RUQ, will continue to monitor. Consider RUQ u/s if worsens  - c/w home lactulose 30g BID  - c/w home propanolol 10mg BID (for varices)  - c/w home furosemide 20mg daily - Patient has history of alcoholic cirrhosis/WALL c/b variceal s/p banding (8/2018) and hepatic encephalopathy (several years ago).   -  (was >400 during previous admission) with normal AST/ALT. Patient has mild tenderness to palpation in RUQ, will continue to monitor. Consider RUQ u/s if new or worsening symptoms  - c/w home lactulose 30g BID  - c/w home propanolol 10mg BID (for varices)  - c/w home furosemide 20mg daily Patient's wife reports recent dark stools with history of melena and hematochezia, had also noted this on prior admission  - Hgb at or above baseline at 13.2, FOBT negative  - CTM for melena, hematochezia, and trend CBCs  - Patient is on daily iron supplement which may cause dark stools  - C/w pantoprazole 40 mg qD  - Titrate home lactulose to 2-3 BM per day Patient's wife reports recent dark stools with history of melena and hematochezia, had also noted this on prior admission  - Hgb at or above baseline at 13.2, FOBT negative  - CTM for melena, hematochezia, and trend CBCs  - Patient is on daily iron supplement which may cause dark stools  - C/w pantoprazole 40 mg qD  - Titrate home lactulose to 2-3 BM per day  - stool counts

## 2021-03-26 NOTE — ED PROVIDER NOTE - ATTENDING CONTRIBUTION TO CARE
Attending Attestation: Dr. Jensen  I have personally performed a history and physical examination of the patient and discussed management with the resident as well as the patient.  I reviewed the resident's note and agree with the documented findings and plan of care.  I have authored and modified critical sections of the Provider Note, including but not limited to HPI, Physical Exam and MDM. 82 yo M c very complex PMHx who p/w weakness and lightheadedness since returning from rehab - worse than at [facility]. No focal findings on exam, is chronically ill appearing but in NAD. No neuro deficits. Electrolyte abn vs cardiac vs infectious vs anemia. will get cbc,cmp, mg, phos, blood cx, cxr, xr pelvis, ua, urine cx, orthostatic vitals. May require admission.

## 2021-03-26 NOTE — H&P ADULT - PROBLEM SELECTOR PLAN 10
Transitions of Care Status:  1.  Name of PCP:  2.  PCP Contacted on Admission: [ ] Y    [ ] N    3.  PCP contacted at Discharge: [ ] Y    [ ] N    [ ] N/A  4.  Post-Discharge Appointment Date and Location:  5.  Summary of Handoff given to PCP: DVT: heparin subQ  Diet: Regular, no pork    Transitions of Care Status:  1.  Name of PCP:  2.  PCP Contacted on Admission: [ ] Y    [ ] N    3.  PCP contacted at Discharge: [ ] Y    [ ] N    [ ] N/A  4.  Post-Discharge Appointment Date and Location:  5.  Summary of Handoff given to PCP:

## 2021-03-26 NOTE — H&P ADULT - NSHPREVIEWOFSYSTEMS_GEN_ALL_CORE
REVIEW OF SYSTEMS:    CONSTITUTIONAL: No weakness, fevers or chills  EYES/ENT: No visual changes;  No vertigo or throat pain   NECK: No pain or stiffness  RESPIRATORY: No cough, wheezing, hemoptysis; No shortness of breath  CARDIOVASCULAR: No chest pain or palpitations  GASTROINTESTINAL: No abdominal or epigastric pain. No nausea, vomiting, or hematemesis; No diarrhea or constipation. No melena or hematochezia.  GENITOURINARY: No dysuria, frequency or hematuria  NEUROLOGICAL: No numbness or weakness  SKIN: No itching, rashes REVIEW OF SYSTEMS:    CONSTITUTIONAL: +weakness, no fevers or chills  EYES/ENT: No visual changes;  No vertigo or throat pain   NECK: No pain or stiffness  RESPIRATORY: No cough, wheezing, hemoptysis; No shortness of breath  CARDIOVASCULAR: No chest pain or palpitations  GASTROINTESTINAL: +episodes of dark loose stools. No abdominal or epigastric pain. No nausea, vomiting, or hematemesis; No hematochezia.  GENITOURINARY: No dysuria, frequency or hematuria  NEUROLOGICAL: No numbness  SKIN: No itching, rashes REVIEW OF SYSTEMS:    CONSTITUTIONAL: +weakness, no fevers or chills  EYES/ENT: No visual changes;  No vertigo or throat pain   NECK: No pain or stiffness  RESPIRATORY: No cough, wheezing, hemoptysis; No shortness of breath  CARDIOVASCULAR: No chest pain or palpitations  GASTROINTESTINAL: +episodes of dark loose stools. No abdominal or epigastric pain. No nausea, vomiting, or hematemesis; No hematochezia.  EXTREMITIES: +mild arthritic joint pain  GENITOURINARY: No dysuria, frequency or hematuria  NEUROLOGICAL: No numbness  SKIN: No itching, rashes REVIEW OF SYSTEMS:    CONSTITUTIONAL: +weakness, no fevers or chills  EYES/ENT: No visual changes;  No vertigo or throat pain   NECK: No pain or stiffness  RESPIRATORY: No cough, wheezing, hemoptysis; No shortness of breath  CARDIOVASCULAR: No chest pain or palpitations  GASTROINTESTINAL: +episodes of dark loose stools. No abdominal or epigastric pain. No nausea, vomiting, or hematemesis; No hematochezia.  EXTREMITIES: +mild arthritic joint pain   GENITOURINARY: No dysuria, frequency or hematuria  NEUROLOGICAL: No numbness  SKIN: No itching, rashes

## 2021-03-26 NOTE — H&P ADULT - NSHPSOCIALHISTORY_GEN_ALL_CORE
Lives at home with wife. Was at rehab for 2 weeks, came home 3/22/21.  No tobacco, alcohol, or other drugs. Heavy alcohol use until 2018.

## 2021-03-26 NOTE — ED PROVIDER NOTE - NS ED ROS FT
GENERAL: No fever or chills,+lightheadedness EYES: no change in vision, HEENT: no trouble speaking, CARDIAC: no chest pain, palpitation PULMONARY: no cough or SOB, GI: no abdominal pain, no nausea, no vomiting, + diarrhea or - constipation, : No changes in urination, SKIN: no rashes, NEURO:+weakness, no headache,  MSK: No muscle pain ~Shandra Lind MD

## 2021-03-26 NOTE — ED ADULT NURSE NOTE - OBJECTIVE STATEMENT
Received pt into spot #23. Pt c/o generalized weakness and dizziness since discharged from recent hospital stay. Pt unable to state when he was discharged from hospital. Denies room spinning but feels lightheaded. Placed on tele monitor SB HR 50s. FS=85. Rectal temp 99.0F.  Gift in to eval pt. Skin warm dry and intact. Appears to have an old scar on sacral area. Blood work obtained and sent. #20 angio placed to [facility].

## 2021-03-26 NOTE — H&P ADULT - ASSESSMENT
Mr. Florez is a 82 yo M with a recent admission for polymicrobial bacteremia 2/2 cholangitis w/gangrenous cholecystitis (2/2021) and a PMH of dementia (AAOx2-3), alcoholic/WALL cirrhosis, HTN, CKD3, RBBB, osteoarthritis, pseudogout, porcelain gallbladder, H. 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 Florez is a 82 yo M with a recent admission for polymicrobial bacteremia 2/2 cholangitis w/gangrenous cholecystitis (2/2021) and a PMH of dementia (AAOx2-3), alcoholic/WALL cirrhosis, HTN, CKD3, RBBB, osteoarthritis, pseudogout, porcelain gallbladder, H. 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

## 2021-03-26 NOTE — ED PROVIDER NOTE - CARE PLAN
Principal Discharge DX:	UTI (urinary tract infection)  Secondary Diagnosis:	Dehydration  Secondary Diagnosis:	KAY (acute kidney injury)

## 2021-03-26 NOTE — ED PROVIDER NOTE - PHYSICAL EXAMINATION
Gen: AAOx3, non-toxic  Head: NCAT  HEENT: EOMI, PERRLA, oral mucosa moist, normal conjunctiva  Lung: CTAB, no respiratory distress, no wheezes/rhonchi/rales B/L, speaking in full sentences  CV: RRR, no murmurs, rubs or gallops  Abd: soft, NTND, no guarding, no CVA tenderness, no rebound tenderness  MSK: no visible deformities, full range of motion of all 4 exts  Neuro: No focal sensory or motor deficits  Skin: Warm, well perfused, no rash  Psych: normal affect.   ~Shandra Lind MD

## 2021-03-27 DIAGNOSIS — N39.0 URINARY TRACT INFECTION, SITE NOT SPECIFIED: ICD-10-CM

## 2021-03-27 DIAGNOSIS — I95.1 ORTHOSTATIC HYPOTENSION: ICD-10-CM

## 2021-03-27 DIAGNOSIS — F03.90 UNSPECIFIED DEMENTIA WITHOUT BEHAVIORAL DISTURBANCE: ICD-10-CM

## 2021-03-27 DIAGNOSIS — Z71.89 OTHER SPECIFIED COUNSELING: ICD-10-CM

## 2021-03-27 DIAGNOSIS — R00.1 BRADYCARDIA, UNSPECIFIED: ICD-10-CM

## 2021-03-27 DIAGNOSIS — R19.5 OTHER FECAL ABNORMALITIES: ICD-10-CM

## 2021-03-27 DIAGNOSIS — K74.60 UNSPECIFIED CIRRHOSIS OF LIVER: ICD-10-CM

## 2021-03-27 DIAGNOSIS — Z02.9 ENCOUNTER FOR ADMINISTRATIVE EXAMINATIONS, UNSPECIFIED: ICD-10-CM

## 2021-03-27 DIAGNOSIS — I10 ESSENTIAL (PRIMARY) HYPERTENSION: ICD-10-CM

## 2021-03-27 DIAGNOSIS — Z29.9 ENCOUNTER FOR PROPHYLACTIC MEASURES, UNSPECIFIED: ICD-10-CM

## 2021-03-27 DIAGNOSIS — N17.9 ACUTE KIDNEY FAILURE, UNSPECIFIED: ICD-10-CM

## 2021-03-27 DIAGNOSIS — M19.90 UNSPECIFIED OSTEOARTHRITIS, UNSPECIFIED SITE: ICD-10-CM

## 2021-03-27 LAB
ALBUMIN SERPL ELPH-MCNC: 2.9 G/DL — LOW (ref 3.3–5)
ALP SERPL-CCNC: 223 U/L — HIGH (ref 40–120)
ALT FLD-CCNC: 17 U/L — SIGNIFICANT CHANGE UP (ref 4–41)
ANION GAP SERPL CALC-SCNC: 12 MMOL/L — SIGNIFICANT CHANGE UP (ref 7–14)
AST SERPL-CCNC: 30 U/L — SIGNIFICANT CHANGE UP (ref 4–40)
BASOPHILS # BLD AUTO: 0.05 K/UL — SIGNIFICANT CHANGE UP (ref 0–0.2)
BASOPHILS NFR BLD AUTO: 1.1 % — SIGNIFICANT CHANGE UP (ref 0–2)
BILIRUB SERPL-MCNC: 1 MG/DL — SIGNIFICANT CHANGE UP (ref 0.2–1.2)
BUN SERPL-MCNC: 25 MG/DL — HIGH (ref 7–23)
CALCIUM SERPL-MCNC: 9 MG/DL — SIGNIFICANT CHANGE UP (ref 8.4–10.5)
CHLORIDE SERPL-SCNC: 107 MMOL/L — SIGNIFICANT CHANGE UP (ref 98–107)
CO2 SERPL-SCNC: 22 MMOL/L — SIGNIFICANT CHANGE UP (ref 22–31)
COVID-19 SPIKE DOMAIN AB INTERP: NEGATIVE — SIGNIFICANT CHANGE UP
COVID-19 SPIKE DOMAIN ANTIBODY RESULT: 0.4 U/ML — SIGNIFICANT CHANGE UP
CREAT SERPL-MCNC: 1.17 MG/DL — SIGNIFICANT CHANGE UP (ref 0.5–1.3)
EOSINOPHIL # BLD AUTO: 0.45 K/UL — SIGNIFICANT CHANGE UP (ref 0–0.5)
EOSINOPHIL NFR BLD AUTO: 9.8 % — HIGH (ref 0–6)
GLUCOSE SERPL-MCNC: 64 MG/DL — LOW (ref 70–99)
HCT VFR BLD CALC: 41.1 % — SIGNIFICANT CHANGE UP (ref 39–50)
HGB BLD-MCNC: 12.8 G/DL — LOW (ref 13–17)
IANC: 2.41 K/UL — SIGNIFICANT CHANGE UP (ref 1.5–8.5)
IMM GRANULOCYTES NFR BLD AUTO: 0.2 % — SIGNIFICANT CHANGE UP (ref 0–1.5)
LYMPHOCYTES # BLD AUTO: 1.22 K/UL — SIGNIFICANT CHANGE UP (ref 1–3.3)
LYMPHOCYTES # BLD AUTO: 26.6 % — SIGNIFICANT CHANGE UP (ref 13–44)
MAGNESIUM SERPL-MCNC: 1.7 MG/DL — SIGNIFICANT CHANGE UP (ref 1.6–2.6)
MCHC RBC-ENTMCNC: 25.7 PG — LOW (ref 27–34)
MCHC RBC-ENTMCNC: 31.1 GM/DL — LOW (ref 32–36)
MCV RBC AUTO: 82.5 FL — SIGNIFICANT CHANGE UP (ref 80–100)
MONOCYTES # BLD AUTO: 0.45 K/UL — SIGNIFICANT CHANGE UP (ref 0–0.9)
MONOCYTES NFR BLD AUTO: 9.8 % — SIGNIFICANT CHANGE UP (ref 2–14)
NEUTROPHILS # BLD AUTO: 2.41 K/UL — SIGNIFICANT CHANGE UP (ref 1.8–7.4)
NEUTROPHILS NFR BLD AUTO: 52.5 % — SIGNIFICANT CHANGE UP (ref 43–77)
NRBC # BLD: 0 /100 WBCS — SIGNIFICANT CHANGE UP
NRBC # FLD: 0 K/UL — SIGNIFICANT CHANGE UP
PHOSPHATE SERPL-MCNC: 2.9 MG/DL — SIGNIFICANT CHANGE UP (ref 2.5–4.5)
PLATELET # BLD AUTO: 123 K/UL — LOW (ref 150–400)
POTASSIUM SERPL-MCNC: 3.8 MMOL/L — SIGNIFICANT CHANGE UP (ref 3.5–5.3)
POTASSIUM SERPL-SCNC: 3.8 MMOL/L — SIGNIFICANT CHANGE UP (ref 3.5–5.3)
PROT SERPL-MCNC: 6.5 G/DL — SIGNIFICANT CHANGE UP (ref 6–8.3)
RBC # BLD: 4.98 M/UL — SIGNIFICANT CHANGE UP (ref 4.2–5.8)
RBC # FLD: 17.8 % — HIGH (ref 10.3–14.5)
SARS-COV-2 IGG+IGM SERPL QL IA: 0.4 U/ML — SIGNIFICANT CHANGE UP
SARS-COV-2 IGG+IGM SERPL QL IA: NEGATIVE — SIGNIFICANT CHANGE UP
SARS-COV-2 RNA SPEC QL NAA+PROBE: SIGNIFICANT CHANGE UP
SODIUM SERPL-SCNC: 141 MMOL/L — SIGNIFICANT CHANGE UP (ref 135–145)
WBC # BLD: 4.59 K/UL — SIGNIFICANT CHANGE UP (ref 3.8–10.5)
WBC # FLD AUTO: 4.59 K/UL — SIGNIFICANT CHANGE UP (ref 3.8–10.5)

## 2021-03-27 PROCEDURE — 12345: CPT | Mod: NC,GC

## 2021-03-27 PROCEDURE — 99223 1ST HOSP IP/OBS HIGH 75: CPT | Mod: GC

## 2021-03-27 RX ORDER — ALBUMIN HUMAN 25 %
500 VIAL (ML) INTRAVENOUS ONCE
Refills: 0 | Status: COMPLETED | OUTPATIENT
Start: 2021-03-27 | End: 2021-03-27

## 2021-03-27 RX ORDER — INFLUENZA VIRUS VACCINE 15; 15; 15; 15 UG/.5ML; UG/.5ML; UG/.5ML; UG/.5ML
0.7 SUSPENSION INTRAMUSCULAR ONCE
Refills: 0 | Status: DISCONTINUED | OUTPATIENT
Start: 2021-03-27 | End: 2021-04-02

## 2021-03-27 RX ORDER — ACETAMINOPHEN 500 MG
650 TABLET ORAL EVERY 8 HOURS
Refills: 0 | Status: DISCONTINUED | OUTPATIENT
Start: 2021-03-27 | End: 2021-04-02

## 2021-03-27 RX ORDER — INFLUENZA VIRUS VACCINE 15; 15; 15; 15 UG/.5ML; UG/.5ML; UG/.5ML; UG/.5ML
0.5 SUSPENSION INTRAMUSCULAR ONCE
Refills: 0 | Status: DISCONTINUED | OUTPATIENT
Start: 2021-03-27 | End: 2021-03-27

## 2021-03-27 RX ORDER — SODIUM CHLORIDE 9 MG/ML
1000 INJECTION INTRAMUSCULAR; INTRAVENOUS; SUBCUTANEOUS
Refills: 0 | Status: DISCONTINUED | OUTPATIENT
Start: 2021-03-27 | End: 2021-03-27

## 2021-03-27 RX ORDER — ACETAMINOPHEN 500 MG
650 TABLET ORAL EVERY 8 HOURS
Refills: 0 | Status: DISCONTINUED | OUTPATIENT
Start: 2021-03-27 | End: 2021-03-27

## 2021-03-27 RX ORDER — ACETAMINOPHEN 500 MG
650 TABLET ORAL EVERY 6 HOURS
Refills: 0 | Status: DISCONTINUED | OUTPATIENT
Start: 2021-03-27 | End: 2021-03-27

## 2021-03-27 RX ADMIN — Medication 650 MILLIGRAM(S): at 02:02

## 2021-03-27 RX ADMIN — GABAPENTIN 100 MILLIGRAM(S): 400 CAPSULE ORAL at 14:38

## 2021-03-27 RX ADMIN — GABAPENTIN 100 MILLIGRAM(S): 400 CAPSULE ORAL at 05:45

## 2021-03-27 RX ADMIN — Medication 20 MILLIGRAM(S): at 05:45

## 2021-03-27 RX ADMIN — HEPARIN SODIUM 5000 UNIT(S): 5000 INJECTION INTRAVENOUS; SUBCUTANEOUS at 14:37

## 2021-03-27 RX ADMIN — HEPARIN SODIUM 5000 UNIT(S): 5000 INJECTION INTRAVENOUS; SUBCUTANEOUS at 22:00

## 2021-03-27 RX ADMIN — Medication 650 MILLIGRAM(S): at 03:00

## 2021-03-27 RX ADMIN — HEPARIN SODIUM 5000 UNIT(S): 5000 INJECTION INTRAVENOUS; SUBCUTANEOUS at 05:45

## 2021-03-27 RX ADMIN — PANTOPRAZOLE SODIUM 40 MILLIGRAM(S): 20 TABLET, DELAYED RELEASE ORAL at 06:27

## 2021-03-27 RX ADMIN — GABAPENTIN 100 MILLIGRAM(S): 400 CAPSULE ORAL at 22:00

## 2021-03-27 RX ADMIN — Medication 500 MILLIGRAM(S): at 12:41

## 2021-03-27 RX ADMIN — Medication 1 TABLET(S): at 12:41

## 2021-03-27 RX ADMIN — Medication 325 MILLIGRAM(S): at 12:42

## 2021-03-27 RX ADMIN — Medication 1 TABLET(S): at 14:36

## 2021-03-27 RX ADMIN — LACTULOSE 30 GRAM(S): 10 SOLUTION ORAL at 05:46

## 2021-03-27 RX ADMIN — ERTAPENEM SODIUM 120 MILLIGRAM(S): 1 INJECTION, POWDER, LYOPHILIZED, FOR SOLUTION INTRAMUSCULAR; INTRAVENOUS at 22:01

## 2021-03-27 RX ADMIN — Medication 62.5 MILLILITER(S): at 12:30

## 2021-03-27 NOTE — PROGRESS NOTE ADULT - ATTENDING COMMENTS
81M h/o cirrhosis, severe cholangitis presented with worsening weakness, found to have UTI. prior culture with MDRO. f/u Ucx. c/w ertapenem. orthostatic positive, Albumin for volume support, hold diuretics. Bradycardia, likely due to propranolol. c/w propranolol with hold parameters for now, monitor HR.

## 2021-03-27 NOTE — PROGRESS NOTE ADULT - PROBLEM SELECTOR PLAN 9
DVT: heparin subQ  Diet: Regular, no pork  Per prior admission, patient is FULL CODE. Per wife, patient is baseline AAOx2-3.   Now AAOx3 on admission without any acute changes in mental status.

## 2021-03-27 NOTE — PROGRESS NOTE ADULT - ASSESSMENT
Mr. Florez is a 82 yo M with a recent admission for polymicrobial bacteremia 2/2 cholangitis w/gangrenous cholecystitis (2/2021) and a PMH of dementia (AAOx2-3), alcoholic/WALL cirrhosis, HTN, CKD3, RBBB, osteoarthritis, pseudogout, porcelain gallbladder, H. 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 Mr. Florez is a 82 yo M with a recent admission for polymicrobial bacteremia 2/2 cholangitis w/gangrenous cholecystitis (2/2021) and a PMH of dementia (AAOx2-3), alcoholic/WALL cirrhosis, HTN, CKD3, RBBB, osteoarthritis, pseudogout, porcelain gallbladder, H. 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

## 2021-03-27 NOTE — PROGRESS NOTE ADULT - SUBJECTIVE AND OBJECTIVE BOX
PROGRESS NOTE:   Authored by Gorge Middleton MD  PGY-1, Internal Medicine  Pager [facility] [phone], [phone]     Patient is a 80y old  Male who presents with a chief complaint of abdominal pain, emesis, dark stools (17 Feb 2021 07:44)      SUBJECTIVE / OVERNIGHT EVENTS: No events overnight.    ADDITIONAL REVIEW OF SYSTEMS: Denies fevers, chills, n/v.    MEDICATIONS  (STANDING):  ascorbic acid 500 milliGRAM(s) Oral daily  calcium carbonate 1250 mG  + Vitamin D (OsCal 500 + D) 1 Tablet(s) Oral daily  cefTRIAXone   IVPB 2000 milliGRAM(s) IV Intermittent every 24 hours  furosemide    Tablet 20 milliGRAM(s) Oral daily  gabapentin 100 milliGRAM(s) Oral three times a day  influenza   Vaccine 0.5 milliLiter(s) IntraMuscular once  lactulose Syrup 30 Gram(s) Oral two times a day  lidocaine   Patch 1 Patch Transdermal daily  multivitamin 1 Tablet(s) Oral daily  pantoprazole    Tablet 40 milliGRAM(s) Oral before breakfast  polyethylene glycol 3350 17 Gram(s) Oral daily  propranolol 10 milliGRAM(s) Oral two times a day  senna 2 Tablet(s) Oral at bedtime    MEDICATIONS  (PRN):  acetaminophen   Tablet .. 650 milliGRAM(s) Oral every 8 hours PRN Temp greater or equal to 38C (100.4F), Mild Pain (1 - 3), Moderate Pain (4 - 6)  bisacodyl Suppository 10 milliGRAM(s) Rectal daily PRN Constipation      CAPILLARY BLOOD GLUCOSE        I&O's Summary    17 Feb 2021 07:01  -  18 Feb 2021 07:00  --------------------------------------------------------  IN: 100 mL / OUT: 1150 mL / NET: -1050 mL        PHYSICAL EXAM:  Vital Signs Last 24 Hrs  T(C): 36.9 (18 Feb 2021 05:01), Max: 37.2 (17 Feb 2021 21:12)  T(F): 98.4 (18 Feb 2021 05:01), Max: 98.9 (17 Feb 2021 21:12)  HR: 60 (18 Feb 2021 07:23) (60 - 64)  BP: 119/56 (18 Feb 2021 05:01) (119/56 - 146/75)  BP(mean): --  RR: 18 (18 Feb 2021 05:01) (18 - 18)  SpO2: 97% (18 Feb 2021 05:01) (97% - 100%)    CONSTITUTIONAL: NAD, lying in bed comfortably  RESPIRATORY: Normal respiratory effort; CTABL  CARDIOVASCULAR: Regular rate and rhythm, normal S1 and S2, no murmur/rub/gallop  ABDOMEN: Soft, nondistended, mild diffuse tenderness to palpation, normoactive bowel sounds, no rebound/guarding  MUSCLOSKELETAL: no joint swelling or tenderness to palpation, FROM all extremities  NEURO: AAOx2-3, full and equal strength all extremities   EXTREMITIES: no pedal edema    LABS:                        11.2   7.00  )-----------( 226      ( 17 Feb 2021 08:11 )             36.2     02-17    135  |  101  |  17  ----------------------------<  94  4.1   |  26  |  1.00    Ca    8.9      17 Feb 2021 08:11  Phos  2.8     02-17  Mg     1.7     02-17    TPro  6.3  /  Alb  2.2<L>  /  TBili  3.3<H>  /  DBili  x   /  AST  56<H>  /  ALT  21  /  AlkPhos  479<H>  02-17    PT/INR - ( 17 Feb 2021 08:11 )   PT: 18.1 sec;   INR: 1.62 ratio        Alli Dee M.D.  Internal Medicine PGY-1  [phone] / [id number]     Patient is a 81y old  Male who presents with a chief complaint of Weakness, UTI (27 Mar 2021 07:30)      SUBJECTIVE / OVERNIGHT EVENTS:          MEDICATIONS  (STANDING):  ascorbic acid 500 milliGRAM(s) Oral daily  calcium carbonate 1250 mG  + Vitamin D (OsCal 500 + D) 1 Tablet(s) Oral daily  ertapenem  IVPB 1000 milliGRAM(s) IV Intermittent every 24 hours  ferrous    sulfate 325 milliGRAM(s) Oral daily  furosemide    Tablet 20 milliGRAM(s) Oral daily  gabapentin 100 milliGRAM(s) Oral three times a day  heparin   Injectable 5000 Unit(s) SubCutaneous every 8 hours  influenza  Vaccine (HIGH DOSE) 0.7 milliLiter(s) IntraMuscular once  lactulose Syrup 30 Gram(s) Oral two times a day  multivitamin 1 Tablet(s) Oral daily  pantoprazole    Tablet 40 milliGRAM(s) Oral before breakfast  propranolol 10 milliGRAM(s) Oral two times a day    MEDICATIONS  (PRN):  acetaminophen   Tablet .. 650 milliGRAM(s) Oral every 8 hours PRN Mild Pain (1 - 3), Moderate Pain (4 - 6)      Vital Signs Last 24 Hrs  T(C): 36.3 (27 Mar 2021 05:22), Max: 37.7 (26 Mar 2021 16:36)  T(F): 97.3 (27 Mar 2021 05:22), Max: 99.9 (26 Mar 2021 16:36)  HR: 86 (27 Mar 2021 05:22) (50 - 86)  BP: 114/65 (27 Mar 2021 05:22) (87/57 - 163/64)  BP(mean): --  RR: 18 (27 Mar 2021 05:22) (18 - 19)  SpO2: 99% (27 Mar 2021 05:22) (99% - 100%)      PHYSICAL EXAM  GENERAL: NAD, well-developed  HEAD:  Atraumatic, Normocephalic  EYES: EOMI, PERRLA, conjunctiva and sclera clear  NECK: Supple, No JVD  CHEST/LUNG: Clear to auscultation bilaterally; No wheeze  HEART: Regular rate and rhythm; No murmurs, rubs, or gallops  ABDOMEN: Soft, Nontender, Nondistended; Bowel sounds present  EXTREMITIES:  2+ Peripheral Pulses, No clubbing, cyanosis, or edema  PSYCH: AAOx3  SKIN: No rashes or lesions    CAPILLARY BLOOD GLUCOSE      POCT Blood Glucose.: 85 mg/dL (26 Mar 2021 17:21)    I&O's Summary      LABS:                        13.2   5.48  )-----------( 165      ( 26 Mar 2021 18:18 )             42.9         140  |  103  |  30<H>  ----------------------------<  93  4.4   |  24  |  1.39<H>    Ca    9.5      26 Mar 2021 18:18  Phos  3.3       Mg     1.7         TPro  7.6  /  Alb  3.5  /  TBili  1.1  /  DBili  x   /  AST  37  /  ALT  20  /  AlkPhos  263<H>      PT/INR - ( 26 Mar 2021 18:18 )   PT: 16.4 sec;   INR: 1.46 ratio         PTT - ( 26 Mar 2021 18:18 )  PTT:34.8 sec      Urinalysis Basic - ( 26 Mar 2021 19:08 )    Color: Yellow / Appearance: Slightly Turbid / S.020 / pH: x  Gluc: x / Ketone: Negative  / Bili: Negative / Urobili: <2 mg/dL   Blood: x / Protein: Trace / Nitrite: Negative   Leuk Esterase: Large / RBC: 1 /HPF / WBC 89 /HPF   Sq Epi: x / Non Sq Epi: 0 /HPF / Bacteria: Many        RADIOLOGY & ADDITIONAL TESTS:     MICROBIOLOGY:    ANTIMICROBIALS:    CONSULTS:      Alli Dee M.D.  Internal Medicine PGY-1  [phone] / [id number]     Patient is a 81y old  Male who presents with a chief complaint of Weakness, UTI (27 Mar 2021 07:30)      SUBJECTIVE / OVERNIGHT EVENTS:    Patient seen and examined at the bedside this am. Per nursing patient admitted overnigh          MEDICATIONS  (STANDING):  ascorbic acid 500 milliGRAM(s) Oral daily  calcium carbonate 1250 mG  + Vitamin D (OsCal 500 + D) 1 Tablet(s) Oral daily  ertapenem  IVPB 1000 milliGRAM(s) IV Intermittent every 24 hours  ferrous    sulfate 325 milliGRAM(s) Oral daily  furosemide    Tablet 20 milliGRAM(s) Oral daily  gabapentin 100 milliGRAM(s) Oral three times a day  heparin   Injectable 5000 Unit(s) SubCutaneous every 8 hours  influenza  Vaccine (HIGH DOSE) 0.7 milliLiter(s) IntraMuscular once  lactulose Syrup 30 Gram(s) Oral two times a day  multivitamin 1 Tablet(s) Oral daily  pantoprazole    Tablet 40 milliGRAM(s) Oral before breakfast  propranolol 10 milliGRAM(s) Oral two times a day    MEDICATIONS  (PRN):  acetaminophen   Tablet .. 650 milliGRAM(s) Oral every 8 hours PRN Mild Pain (1 - 3), Moderate Pain (4 - 6)      Vital Signs Last 24 Hrs  T(C): 36.3 (27 Mar 2021 05:22), Max: 37.7 (26 Mar 2021 16:36)  T(F): 97.3 (27 Mar 2021 05:22), Max: 99.9 (26 Mar 2021 16:36)  HR: 86 (27 Mar 2021 05:22) (50 - 86)  BP: 114/65 (27 Mar 2021 05:22) (87/57 - 163/64)  BP(mean): --  RR: 18 (27 Mar 2021 05:22) (18 - 19)  SpO2: 99% (27 Mar 2021 05:22) (99% - 100%)      PHYSICAL EXAM  GENERAL: NAD, well-developed  HEAD:  Atraumatic, Normocephalic  EYES: EOMI, PERRLA, conjunctiva and sclera clear  NECK: Supple, No JVD  CHEST/LUNG: Clear to auscultation bilaterally; No wheeze  HEART: Regular rate and rhythm; No murmurs, rubs, or gallops  ABDOMEN: Soft, Nontender, Nondistended; Bowel sounds present  EXTREMITIES:  2+ Peripheral Pulses, No clubbing, cyanosis, or edema  PSYCH: AAOx3  SKIN: No rashes or lesions    CAPILLARY BLOOD GLUCOSE      POCT Blood Glucose.: 85 mg/dL (26 Mar 2021 17:21)    I&O's Summary      LABS:                        13.2   5.48  )-----------( 165      ( 26 Mar 2021 18:18 )             42.9         140  |  103  |  30<H>  ----------------------------<  93  4.4   |  24  |  1.39<H>    Ca    9.5      26 Mar 2021 18:18  Phos  3.3       Mg     1.7         TPro  7.6  /  Alb  3.5  /  TBili  1.1  /  DBili  x   /  AST  37  /  ALT  20  /  AlkPhos  263<H>      PT/INR - ( 26 Mar 2021 18:18 )   PT: 16.4 sec;   INR: 1.46 ratio         PTT - ( 26 Mar 2021 18:18 )  PTT:34.8 sec      Urinalysis Basic - ( 26 Mar 2021 19:08 )    Color: Yellow / Appearance: Slightly Turbid / S.020 / pH: x  Gluc: x / Ketone: Negative  / Bili: Negative / Urobili: <2 mg/dL   Blood: x / Protein: Trace / Nitrite: Negative   Leuk Esterase: Large / RBC: 1 /HPF / WBC 89 /HPF   Sq Epi: x / Non Sq Epi: 0 /HPF / Bacteria: Many        RADIOLOGY & ADDITIONAL TESTS:     MICROBIOLOGY:    ANTIMICROBIALS:    CONSULTS:      Alli Dee M.D.  Internal Medicine PGY-1  [phone] / [id number]     Patient is a 81y old  Male who presents with a chief complaint of Weakness, UTI (27 Mar 2021 07:30)      SUBJECTIVE / OVERNIGHT EVENTS:    Patient seen and examined at the bedside this am. Per nursing patient admitted overnight for orthostatic hypotension and urinary tract infection. This morning on interview patient endorses progressive worsening of his weakness over the course of this past month. Patient reports being discharged from rehab after a lengthy hospitalization last month. The patient states that he has progressively gotten weaker and unable to perform his ADLs. After discharge from the rehab he was supposed to have home PT however those services did not arrive on time and he started experiencing worsening dizziness and fatigue. He denies any abdominal pain, dysuria, nausea, vomiting but states that he has had poor PO intake           MEDICATIONS  (STANDING):  ascorbic acid 500 milliGRAM(s) Oral daily  calcium carbonate 1250 mG  + Vitamin D (OsCal 500 + D) 1 Tablet(s) Oral daily  ertapenem  IVPB 1000 milliGRAM(s) IV Intermittent every 24 hours  ferrous    sulfate 325 milliGRAM(s) Oral daily  furosemide    Tablet 20 milliGRAM(s) Oral daily  gabapentin 100 milliGRAM(s) Oral three times a day  heparin   Injectable 5000 Unit(s) SubCutaneous every 8 hours  influenza  Vaccine (HIGH DOSE) 0.7 milliLiter(s) IntraMuscular once  lactulose Syrup 30 Gram(s) Oral two times a day  multivitamin 1 Tablet(s) Oral daily  pantoprazole    Tablet 40 milliGRAM(s) Oral before breakfast  propranolol 10 milliGRAM(s) Oral two times a day    MEDICATIONS  (PRN):  acetaminophen   Tablet .. 650 milliGRAM(s) Oral every 8 hours PRN Mild Pain (1 - 3), Moderate Pain (4 - 6)      Vital Signs Last 24 Hrs  T(C): 36.3 (27 Mar 2021 05:22), Max: 37.7 (26 Mar 2021 16:36)  T(F): 97.3 (27 Mar 2021 05:22), Max: 99.9 (26 Mar 2021 16:36)  HR: 86 (27 Mar 2021 05:22) (50 - 86)  BP: 114/65 (27 Mar 2021 05:22) (87/57 - 163/64)  BP(mean): --  RR: 18 (27 Mar 2021 05:22) (18 - 19)  SpO2: 99% (27 Mar 2021 05:22) (99% - 100%)    GENERAL: NAD, lying in bed  HEAD:  Atraumatic, Normocephalic  EYES: EOMI, conjunctiva and sclera clear  NECK: Supple  CHEST/LUNG: Clear to auscultation bilaterally; No wheeze  HEART: S1S2 Regular rate and rhythm; No murmurs, rubs, or gallops  ABDOMEN: Soft, non-tender, non distended.   EXTREMITIES: No clubbing, cyanosis, or edema; no evidence of   PSYCH: AAOx3  NEUROLOGY: non-focal, no asterixis, 4/5 strength   SKIN: No rashes or lesions      CAPILLARY BLOOD GLUCOSE      POCT Blood Glucose.: 85 mg/dL (26 Mar 2021 17:21)    I&O's Summary      LABS:                        13.2   5.48  )-----------( 165      ( 26 Mar 2021 18:18 )             42.9         140  |  103  |  30<H>  ----------------------------<  93  4.4   |  24  |  1.39<H>    Ca    9.5      26 Mar 2021 18:18  Phos  3.3       Mg     1.7         TPro  7.6  /  Alb  3.5  /  TBili  1.1  /  DBili  x   /  AST  37  /  ALT  20  /  AlkPhos  263<H>  -26    PT/INR - ( 26 Mar 2021 18:18 )   PT: 16.4 sec;   INR: 1.46 ratio         PTT - ( 26 Mar 2021 18:18 )  PTT:34.8 sec      Urinalysis Basic - ( 26 Mar 2021 19:08 )    Color: Yellow / Appearance: Slightly Turbid / S.020 / pH: x  Gluc: x / Ketone: Negative  / Bili: Negative / Urobili: <2 mg/dL   Blood: x / Protein: Trace / Nitrite: Negative   Leuk Esterase: Large / RBC: 1 /HPF / WBC 89 /HPF   Sq Epi: x / Non Sq Epi: 0 /HPF / Bacteria: Many        RADIOLOGY & ADDITIONAL TESTS:     MICROBIOLOGY:    ANTIMICROBIALS:    CONSULTS:

## 2021-03-27 NOTE — PROGRESS NOTE ADULT - PROBLEM SELECTOR PLAN 5
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   -  (was >400 during previous admission) with normal AST/ALT, normal TBili. Patient has mild tenderness to palpation in RUQ, will continue to monitor. Consider RUQ u/s if new or worsening symptoms  - c/w home lactulose 30g BID  - c/w home propanolol 10mg BID (for varices) with hold parameters for HR<50 and SBP <100.   - holding home furosemide 20mg qd on 3/27 for KAY/orthostatic hypotension. Can consider restarting when patient orthostatic negative.

## 2021-03-27 NOTE — PROGRESS NOTE ADULT - PROBLEM SELECTOR PLAN 6
Essential hypertension  Patient not on any home anti-hypertensives other than propranolol   - Will continue to monitor, BP 140s/80s Sinus bradycardia to 50s. Most likely due to propranolol use (iatrogenic in nature).   -EKG with evidence of RBBB but otherwise no evidence of heart block.

## 2021-03-27 NOTE — PROGRESS NOTE ADULT - PROBLEM SELECTOR PLAN 3
Patient's wife reports recent dark stools with history of melena and hematochezia, had also noted this on prior admission  - Hgb at or above baseline at 13.2, FOBT negative  - CTM for melena, hematochezia, and trend CBCs  - Patient is on daily iron supplement which may cause dark stools  - C/w pantoprazole 40 mg qD  - Titrate home lactulose to 2-3 BM per day  - stool counts Patient with KAY on CKD (Cr 1.39). Most likely pre-renal i/s/o poor PO intake.   Recent baseline 1.0-1.3.  -resolving with IVF hydration.

## 2021-03-27 NOTE — PROGRESS NOTE ADULT - PROBLEM SELECTOR PLAN 2
Patient with KAY on CKD (Cr 1.39).   Recent baseline 1.0-1.3.  Will treat as above -Patient with history of ESBL E. Coli UTI, most recently in 2/2021. Patient has +UA with KAY, worsening weakness and fatigue and decreased appetite.   - Will c/w Ertapenem for now. Should treat for 7-10 day course for complicated UTI given previous culture hx (3/27-)  - F/u urine cultures, blood cultures. Blood cultures previously in 02/06/21 with evidence of ESBL e coli along with MDRO Klepne which was resistant to ertapenem and treated with Noa/Vaborbactram.  If patient has positive blood cultures will need ID consult for possible broadening of abx.   - Monitor for fevers, hemodynamic instability

## 2021-03-27 NOTE — PROGRESS NOTE ADULT - PROBLEM SELECTOR PLAN 4
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   -  (was >400 during previous admission) with normal AST/ALT, normal TBili. Patient has mild tenderness to palpation in RUQ, will continue to monitor. Consider RUQ u/s if new or worsening symptoms  - c/w home lactulose 30g BID  - c/w home propanolol 10mg BID (for varices)  - c/w home furosemide 20mg daily Patient's wife reports recent dark stools with history of melena and hematochezia, had also noted this on prior admission  - Hgb at or above baseline at 13.2, FOBT negative  - CTM for melena, hematochezia, and trend CBCs  - Patient is on daily iron supplement which may cause dark stools  - C/w pantoprazole 40 mg qD  - Titrate home lactulose to 2-3 BM per day  - stool counts  - consider ammonia level if patient encephalopathic.

## 2021-03-27 NOTE — PROGRESS NOTE ADULT - PROBLEM SELECTOR PLAN 1
-Patient with history of ESBL E. Coli UTI, most recently in 2/2021  -Patient has +UA with KAY, worsening weakness and fatigue and decreased appetite.   - No signs of sepsis, no leukocytosis, patient is hemodynamically stable and afebrile, although +orthostatic hypotension. Initial BP was recorded as 87/57, subsequent SBP 140s.  - Will c/w Ertapenem for now  - F/u urine cultures, blood cultures  - Monitor for fevers, hemodynamic instability Endorsing dizziness and weakness at home with fall. XR hip negative for acute pathology. + orthostatics in the ED and on the floor. Most likely i/s/o poor PO given pre-renal KAY and hx of poor intake at home with diuretic use for ascites.   -IVF resuscitation s/p 1L NS in ED and albumin x1 here on the floor  -F/u repeat orthostatics 3/27 PM and resuscitate further if needed  -If persistent orthostatic despite adequate hydration will need to assess for other etiologies.

## 2021-03-27 NOTE — PROGRESS NOTE ADULT - PROBLEM SELECTOR PLAN 7
Patient with arthritis with mild chronic joint pain   - Will give Tylenol PRN q8h, max 2000 mg/day given cirrhosis Essential hypertension  Patient not on any home anti-hypertensives other than propranolol   - Will continue to monitor, BP

## 2021-03-27 NOTE — PROGRESS NOTE ADULT - PROBLEM SELECTOR PLAN 8
Per wife, patient is baseline AAOx2-3.   Now AAOx3 on admission without any acute changes in mental status. Patient with arthritis with mild chronic joint pain   - Will give Tylenol PRN q8h, max 2000 mg/day given cirrhosis

## 2021-03-28 LAB
ALBUMIN SERPL ELPH-MCNC: 3.4 G/DL — SIGNIFICANT CHANGE UP (ref 3.3–5)
ALP SERPL-CCNC: 218 U/L — HIGH (ref 40–120)
ALT FLD-CCNC: 15 U/L — SIGNIFICANT CHANGE UP (ref 4–41)
ANION GAP SERPL CALC-SCNC: 12 MMOL/L — SIGNIFICANT CHANGE UP (ref 7–14)
AST SERPL-CCNC: 26 U/L — SIGNIFICANT CHANGE UP (ref 4–40)
BASOPHILS # BLD AUTO: 0.06 K/UL — SIGNIFICANT CHANGE UP (ref 0–0.2)
BASOPHILS NFR BLD AUTO: 1.1 % — SIGNIFICANT CHANGE UP (ref 0–2)
BILIRUB SERPL-MCNC: 1 MG/DL — SIGNIFICANT CHANGE UP (ref 0.2–1.2)
BUN SERPL-MCNC: 18 MG/DL — SIGNIFICANT CHANGE UP (ref 7–23)
CALCIUM SERPL-MCNC: 9.2 MG/DL — SIGNIFICANT CHANGE UP (ref 8.4–10.5)
CHLORIDE SERPL-SCNC: 104 MMOL/L — SIGNIFICANT CHANGE UP (ref 98–107)
CO2 SERPL-SCNC: 25 MMOL/L — SIGNIFICANT CHANGE UP (ref 22–31)
CREAT SERPL-MCNC: 0.99 MG/DL — SIGNIFICANT CHANGE UP (ref 0.5–1.3)
EOSINOPHIL # BLD AUTO: 0.57 K/UL — HIGH (ref 0–0.5)
EOSINOPHIL NFR BLD AUTO: 10.6 % — HIGH (ref 0–6)
GLUCOSE SERPL-MCNC: 90 MG/DL — SIGNIFICANT CHANGE UP (ref 70–99)
HCT VFR BLD CALC: 42.5 % — SIGNIFICANT CHANGE UP (ref 39–50)
HGB BLD-MCNC: 13.6 G/DL — SIGNIFICANT CHANGE UP (ref 13–17)
IANC: 2.88 K/UL — SIGNIFICANT CHANGE UP (ref 1.5–8.5)
IMM GRANULOCYTES NFR BLD AUTO: 0.2 % — SIGNIFICANT CHANGE UP (ref 0–1.5)
LYMPHOCYTES # BLD AUTO: 1.33 K/UL — SIGNIFICANT CHANGE UP (ref 1–3.3)
LYMPHOCYTES # BLD AUTO: 24.8 % — SIGNIFICANT CHANGE UP (ref 13–44)
MAGNESIUM SERPL-MCNC: 1.5 MG/DL — LOW (ref 1.6–2.6)
MCHC RBC-ENTMCNC: 26.3 PG — LOW (ref 27–34)
MCHC RBC-ENTMCNC: 32 GM/DL — SIGNIFICANT CHANGE UP (ref 32–36)
MCV RBC AUTO: 82 FL — SIGNIFICANT CHANGE UP (ref 80–100)
MONOCYTES # BLD AUTO: 0.52 K/UL — SIGNIFICANT CHANGE UP (ref 0–0.9)
MONOCYTES NFR BLD AUTO: 9.7 % — SIGNIFICANT CHANGE UP (ref 2–14)
NEUTROPHILS # BLD AUTO: 2.88 K/UL — SIGNIFICANT CHANGE UP (ref 1.8–7.4)
NEUTROPHILS NFR BLD AUTO: 53.6 % — SIGNIFICANT CHANGE UP (ref 43–77)
NRBC # BLD: 0 /100 WBCS — SIGNIFICANT CHANGE UP
NRBC # FLD: 0 K/UL — SIGNIFICANT CHANGE UP
PHOSPHATE SERPL-MCNC: 2.3 MG/DL — LOW (ref 2.5–4.5)
PLATELET # BLD AUTO: 119 K/UL — LOW (ref 150–400)
POTASSIUM SERPL-MCNC: 3.8 MMOL/L — SIGNIFICANT CHANGE UP (ref 3.5–5.3)
POTASSIUM SERPL-SCNC: 3.8 MMOL/L — SIGNIFICANT CHANGE UP (ref 3.5–5.3)
PROT SERPL-MCNC: 6.8 G/DL — SIGNIFICANT CHANGE UP (ref 6–8.3)
RBC # BLD: 5.18 M/UL — SIGNIFICANT CHANGE UP (ref 4.2–5.8)
RBC # FLD: 17.6 % — HIGH (ref 10.3–14.5)
SODIUM SERPL-SCNC: 141 MMOL/L — SIGNIFICANT CHANGE UP (ref 135–145)
WBC # BLD: 5.37 K/UL — SIGNIFICANT CHANGE UP (ref 3.8–10.5)
WBC # FLD AUTO: 5.37 K/UL — SIGNIFICANT CHANGE UP (ref 3.8–10.5)

## 2021-03-28 PROCEDURE — 99233 SBSQ HOSP IP/OBS HIGH 50: CPT | Mod: GC

## 2021-03-28 RX ORDER — MAGNESIUM SULFATE 500 MG/ML
2 VIAL (ML) INJECTION ONCE
Refills: 0 | Status: COMPLETED | OUTPATIENT
Start: 2021-03-28 | End: 2021-03-28

## 2021-03-28 RX ORDER — SODIUM,POTASSIUM PHOSPHATES 278-250MG
1 POWDER IN PACKET (EA) ORAL ONCE
Refills: 0 | Status: COMPLETED | OUTPATIENT
Start: 2021-03-28 | End: 2021-03-28

## 2021-03-28 RX ADMIN — Medication 50 GRAM(S): at 13:52

## 2021-03-28 RX ADMIN — HEPARIN SODIUM 5000 UNIT(S): 5000 INJECTION INTRAVENOUS; SUBCUTANEOUS at 22:32

## 2021-03-28 RX ADMIN — PANTOPRAZOLE SODIUM 40 MILLIGRAM(S): 20 TABLET, DELAYED RELEASE ORAL at 07:00

## 2021-03-28 RX ADMIN — HEPARIN SODIUM 5000 UNIT(S): 5000 INJECTION INTRAVENOUS; SUBCUTANEOUS at 13:52

## 2021-03-28 RX ADMIN — Medication 650 MILLIGRAM(S): at 05:19

## 2021-03-28 RX ADMIN — Medication 1 TABLET(S): at 13:53

## 2021-03-28 RX ADMIN — Medication 325 MILLIGRAM(S): at 13:53

## 2021-03-28 RX ADMIN — GABAPENTIN 100 MILLIGRAM(S): 400 CAPSULE ORAL at 05:43

## 2021-03-28 RX ADMIN — Medication 650 MILLIGRAM(S): at 23:15

## 2021-03-28 RX ADMIN — Medication 650 MILLIGRAM(S): at 22:30

## 2021-03-28 RX ADMIN — LACTULOSE 30 GRAM(S): 10 SOLUTION ORAL at 05:43

## 2021-03-28 RX ADMIN — GABAPENTIN 100 MILLIGRAM(S): 400 CAPSULE ORAL at 22:29

## 2021-03-28 RX ADMIN — HEPARIN SODIUM 5000 UNIT(S): 5000 INJECTION INTRAVENOUS; SUBCUTANEOUS at 05:43

## 2021-03-28 RX ADMIN — Medication 650 MILLIGRAM(S): at 06:10

## 2021-03-28 RX ADMIN — Medication 1 PACKET(S): at 13:52

## 2021-03-28 RX ADMIN — GABAPENTIN 100 MILLIGRAM(S): 400 CAPSULE ORAL at 13:52

## 2021-03-28 RX ADMIN — Medication 500 MILLIGRAM(S): at 13:53

## 2021-03-28 RX ADMIN — LACTULOSE 30 GRAM(S): 10 SOLUTION ORAL at 18:30

## 2021-03-28 RX ADMIN — ERTAPENEM SODIUM 120 MILLIGRAM(S): 1 INJECTION, POWDER, LYOPHILIZED, FOR SOLUTION INTRAMUSCULAR; INTRAVENOUS at 22:30

## 2021-03-28 NOTE — PROGRESS NOTE ADULT - PROBLEM SELECTOR PLAN 5
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   -  (was >400 during previous admission) with normal AST/ALT, normal TBili. Patient has mild tenderness to palpation in RUQ, will continue to monitor. Consider RUQ u/s if new or worsening symptoms  - c/w home lactulose 30g BID  - c/w home propanolol 10mg BID (for varices) with hold parameters for HR<50 and SBP <100.   - holding home furosemide 20mg qd on 3/27 for KAY/orthostatic hypotension. Can consider restarting when patient orthostatic negative.

## 2021-03-28 NOTE — PROGRESS NOTE ADULT - PROBLEM SELECTOR PLAN 3
Patient with KAY on CKD (Cr 1.39). Most likely pre-renal i/s/o poor PO intake.   Recent baseline 1.0-1.3.  -resolving with IVF hydration.

## 2021-03-28 NOTE — PROGRESS NOTE ADULT - ATTENDING COMMENTS
81M presented with weakness due to UTI. c/w abx. f/u culture sensitivity, currently growing E.coli. orthostatic hypotension, in setting of diuretics and diarrhea from lactulose. improved with IVF/albumin. cont to monitor. PT eval. 81M presented with weakness due to UTI. c/w abx. f/u culture sensitivity, currently growing E.coli. orthostatic hypotension, in setting of diuretics and diarrhea from lactulose. improved with IVF/albumin. cont to monitor. PT eval for dispo.

## 2021-03-28 NOTE — PROGRESS NOTE ADULT - PROBLEM SELECTOR PLAN 2
-Patient with history of ESBL E. Coli UTI, most recently in 2/2021. Patient has +UA with KAY, worsening weakness and fatigue and decreased appetite.   - Will c/w Ertapenem for now. Should treat for 7-10 day course for complicated UTI given previous culture hx (3/27-)  - F/u urine cultures, blood cultures. Blood cultures previously in 02/06/21 with evidence of ESBL e coli along with MDRO Klepne which was resistant to ertapenem and treated with Noa/Vaborbactram.  If patient has positive blood cultures will need ID consult for possible broadening of abx.   - Monitor for fevers, hemodynamic instability -Patient with history of ESBL E. Coli UTI, most recently in 2/2021. Patient has +UA with KAY, worsening weakness and fatigue and decreased appetite.   - Will c/w Ertapenem for now. Should treat for 7-10 day course for complicated UTI given previous culture hx (3/27-)  - F/u urine cultures, blood cultures. Blood cultures previously in 02/06/21 with evidence of ESBL e coli along with MDRO Klepne which was resistant to ertapenem and treated with Noa/Vaborbactram.  If patient has positive blood cultures will need ID consult for possible broadening of abx.   -UCX 3/26 > 100k E. coli, f/u sensitivities   - Monitor for fevers, hemodynamic instability

## 2021-03-28 NOTE — PROGRESS NOTE ADULT - PROBLEM SELECTOR PLAN 6
Sinus bradycardia to 50s. Most likely due to propranolol use (iatrogenic in nature).   -EKG with evidence of RBBB but otherwise no evidence of heart block.

## 2021-03-28 NOTE — PROGRESS NOTE ADULT - ASSESSMENT
Mr. Florez is a 80 yo M with a recent admission for polymicrobial bacteremia 2/2 cholangitis w/gangrenous cholecystitis (2/2021) and a PMH of dementia (AAOx2-3), alcoholic/WALL cirrhosis, HTN, CKD3, RBBB, osteoarthritis, pseudogout, porcelain gallbladder, H. 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

## 2021-03-28 NOTE — PROGRESS NOTE ADULT - PROBLEM SELECTOR PLAN 4
Patient's wife reports recent dark stools with history of melena and hematochezia, had also noted this on prior admission  - Hgb at or above baseline at 13.2, FOBT negative  - CTM for melena, hematochezia, and trend CBCs  - Patient is on daily iron supplement which may cause dark stools  - C/w pantoprazole 40 mg qD  - Titrate home lactulose to 2-3 BM per day  - stool counts  - consider ammonia level if patient encephalopathic.

## 2021-03-28 NOTE — PROGRESS NOTE ADULT - PROBLEM SELECTOR PLAN 8
Patient with arthritis with mild chronic joint pain   - Will give Tylenol PRN q8h, max 2000 mg/day given cirrhosis

## 2021-03-28 NOTE — PROGRESS NOTE ADULT - PROBLEM SELECTOR PLAN 1
Endorsing dizziness and weakness at home with fall. XR hip negative for acute pathology. + orthostatics in the ED and on the floor. Most likely i/s/o poor PO given pre-renal KAY and hx of poor intake at home with diuretic use for ascites.   -IVF resuscitation s/p 1L NS in ED and albumin x1 here on the floor  -F/u repeat orthostatics 3/27 PM and resuscitate further if needed  -If persistent orthostatic despite adequate hydration will need to assess for other etiologies. Endorsing dizziness and weakness at home with fall. XR hip negative for acute pathology. + orthostatics in the ED and on the floor. Most likely i/s/o poor PO given pre-renal KAY and hx of poor intake at home with diuretic use for ascites.   -IVF resuscitation s/p 1L NS in ED and albumin x1 here on the floor  -If persistent orthostatic despite adequate hydration will need to assess for other etiologies.  -Check ortostatics today, IVF if needed

## 2021-03-28 NOTE — PROGRESS NOTE ADULT - SUBJECTIVE AND OBJECTIVE BOX
***************************************************************  Davon Cage, PGY2  Internal Medicine   pager: [phone]  ***************************************************************    KETAN TIPTON  81y  MRN: [id number]    Patient is a 81y old  Male who presents with a chief complaint of Weakness, UTI (27 Mar 2021 07:30)      Subjective: no events ON. Denies fever, CP, SOB, abn pain, N/V, dysuria. Tolerating diet.      MEDICATIONS  (STANDING):  ascorbic acid 500 milliGRAM(s) Oral daily  calcium carbonate 1250 mG  + Vitamin D (OsCal 500 + D) 1 Tablet(s) Oral daily  ertapenem  IVPB 1000 milliGRAM(s) IV Intermittent every 24 hours  ferrous    sulfate 325 milliGRAM(s) Oral daily  gabapentin 100 milliGRAM(s) Oral three times a day  heparin   Injectable 5000 Unit(s) SubCutaneous every 8 hours  influenza  Vaccine (HIGH DOSE) 0.7 milliLiter(s) IntraMuscular once  lactulose Syrup 30 Gram(s) Oral two times a day  multivitamin 1 Tablet(s) Oral daily  pantoprazole    Tablet 40 milliGRAM(s) Oral before breakfast  propranolol 10 milliGRAM(s) Oral two times a day    MEDICATIONS  (PRN):  acetaminophen   Tablet .. 650 milliGRAM(s) Oral every 8 hours PRN Mild Pain (1 - 3), Moderate Pain (4 - 6)      Objective:    Vitals: Vital Signs Last 24 Hrs  T(C): 36.3 (21 @ 05:41), Max: 36.4 (21 @ 12:26)  T(F): 97.3 (21 @ 05:41), Max: 97.5 (21 @ 12:26)  HR: 70 (21 @ 05:41) (70 - 81)  BP: 129/75 (21 @ 05:41) (119/78 - 150/66)  BP(mean): --  RR: 18 (21 @ 05:41) (17 - 18)  SpO2: 100% (21 @ 05:41) (99% - 100%)            I&O's Summary    27 Mar 2021 07:01  -  28 Mar 2021 07:00  --------------------------------------------------------  IN: 0 mL / OUT: 400 mL / NET: -400 mL        PHYSICAL EXAM:  GENERAL: NAD  HEAD:  Atraumatic, Normocephalic  EYES: EOMI, conjunctiva and sclera clear  CHEST/LUNG: Clear to percussion bilaterally; No rales, rhonchi, wheezing, or rubs  HEART: Regular rate and rhythm; No murmurs, rubs, or gallops  ABDOMEN: Soft, Nontender, Nondistended;   SKIN: No rashes or lesions  NERVOUS SYSTEM:  Alert & Oriented X3, no focal deficit    LABS:      141  |  104  |  18  ----------------------------<  90  3.8   |  25  |  0.99      141  |  107  |  25<H>  ----------------------------<  64<L>  3.8   |  22  |  1.17      140  |  103  |  30<H>  ----------------------------<  93  4.4   |  24  |  1.39<H>    Ca    9.2      28 Mar 2021 07:04  Ca    9.0      27 Mar 2021 08:25  Ca    9.5      26 Mar 2021 18:18  Phos  2.3       Mg     1.5         TPro  6.8  /  Alb  3.4  /  TBili  1.0  /  DBili  x   /  AST  26  /  ALT  15  /  AlkPhos  218<H>    TPro  6.5  /  Alb  2.9<L>  /  TBili  1.0  /  DBili  x   /  AST  30  /  ALT  17  /  AlkPhos  223<H>  03-  TPro  7.6  /  Alb  3.5  /  TBili  1.1  /  DBili  x   /  AST  37  /  ALT  20  /  AlkPhos  263<H>  03-      PT/INR - ( 26 Mar 2021 18:18 )   PT: 16.4 sec;   INR: 1.46 ratio         PTT - ( 26 Mar 2021 18:18 )  PTT:34.8 sec              Urinalysis Basic - ( 26 Mar 2021 19:08 )    Color: Yellow / Appearance: Slightly Turbid / S.020 / pH: x  Gluc: x / Ketone: Negative  / Bili: Negative / Urobili: <2 mg/dL   Blood: x / Protein: Trace / Nitrite: Negative   Leuk Esterase: Large / RBC: 1 /HPF / WBC 89 /HPF   Sq Epi: x / Non Sq Epi: 0 /HPF / Bacteria: Many                              13.6   5.37  )-----------( 119      ( 28 Mar 2021 07:04 )             42.5                         12.8   4.59  )-----------( 123      ( 27 Mar 2021 08:29 )             41.1                         13.2   5.48  )-----------( 165      ( 26 Mar 2021 18:18 )             42.9     CAPILLARY BLOOD GLUCOSE          RADIOLOGY & ADDITIONAL TESTS:    Imaging Personally Reviewed:  [x ] YES  [ ] NO    Consultants involved in case:   Consultant(s) Notes Reviewed:  [ x] YES  [ ] NO:   Care Discussed with Consultants/Other Providers [x ] YES  [ ] NO         ***************************************************************  Davon Cage, PGY2  Internal Medicine   pager: [phone]  ***************************************************************    KETAN TIPTON  81y  MRN: [id number]    Patient is a 81y old  Male who presents with a chief complaint of Weakness, UTI (27 Mar 2021 07:30)      Subjective: no events ON. This morning patient is complaining of chronic pain in both hands and his R shoulder. No BM's over the last 24 hours. Denies fever, CP, SOB, abn pain, N/V, dysuria. Tolerating diet.      MEDICATIONS  (STANDING):  ascorbic acid 500 milliGRAM(s) Oral daily  calcium carbonate 1250 mG  + Vitamin D (OsCal 500 + D) 1 Tablet(s) Oral daily  ertapenem  IVPB 1000 milliGRAM(s) IV Intermittent every 24 hours  ferrous    sulfate 325 milliGRAM(s) Oral daily  gabapentin 100 milliGRAM(s) Oral three times a day  heparin   Injectable 5000 Unit(s) SubCutaneous every 8 hours  influenza  Vaccine (HIGH DOSE) 0.7 milliLiter(s) IntraMuscular once  lactulose Syrup 30 Gram(s) Oral two times a day  multivitamin 1 Tablet(s) Oral daily  pantoprazole    Tablet 40 milliGRAM(s) Oral before breakfast  propranolol 10 milliGRAM(s) Oral two times a day    MEDICATIONS  (PRN):  acetaminophen   Tablet .. 650 milliGRAM(s) Oral every 8 hours PRN Mild Pain (1 - 3), Moderate Pain (4 - 6)      Objective:    Vitals: Vital Signs Last 24 Hrs  T(C): 36.3 (21 @ 05:41), Max: 36.4 (21 @ 12:26)  T(F): 97.3 (21 @ 05:41), Max: 97.5 (21 @ 12:26)  HR: 70 (21 @ 05:41) (70 - 81)  BP: 129/75 (21 @ 05:41) (119/78 - 150/66)  BP(mean): --  RR: 18 (21 @ 05:41) (17 - 18)  SpO2: 100% (21 @ 05:41) (99% - 100%)            I&O's Summary    27 Mar 2021 07:01  -  28 Mar 2021 07:00  --------------------------------------------------------  IN: 0 mL / OUT: 400 mL / NET: -400 mL        PHYSICAL EXAM:  GENERAL: NAD  HEAD:  Atraumatic, Normocephalic  EYES: EOMI, conjunctiva and sclera clear  CHEST/LUNG: Clear to percussion bilaterally; No rales, rhonchi, wheezing, or rubs  HEART: Regular rate and rhythm; No murmurs, rubs, or gallops  ABDOMEN: Soft, Nontender, Nondistended;   SKIN: No rashes or lesions  NERVOUS SYSTEM:  Alert & Oriented X3, no focal deficit    LABS:      141  |  104  |  18  ----------------------------<  90  3.8   |  25  |  0.99      141  |  107  |  25<H>  ----------------------------<  64<L>  3.8   |  22  |  1.17      140  |  103  |  30<H>  ----------------------------<  93  4.4   |  24  |  1.39<H>    Ca    9.2      28 Mar 2021 07:04  Ca    9.0      27 Mar 2021 08:25  Ca    9.5      26 Mar 2021 18:18  Phos  2.3       Mg     1.5         TPro  6.8  /  Alb  3.4  /  TBili  1.0  /  DBili  x   /  AST  26  /  ALT  15  /  AlkPhos  218<H>  03-28  TPro  6.5  /  Alb  2.9<L>  /  TBili  1.0  /  DBili  x   /  AST  30  /  ALT  17  /  AlkPhos  223<H>  03-27  TPro  7.6  /  Alb  3.5  /  TBili  1.1  /  DBili  x   /  AST  37  /  ALT  20  /  AlkPhos  263<H>  03-26      PT/INR - ( 26 Mar 2021 18:18 )   PT: 16.4 sec;   INR: 1.46 ratio         PTT - ( 26 Mar 2021 18:18 )  PTT:34.8 sec              Urinalysis Basic - ( 26 Mar 2021 19:08 )    Color: Yellow / Appearance: Slightly Turbid / S.020 / pH: x  Gluc: x / Ketone: Negative  / Bili: Negative / Urobili: <2 mg/dL   Blood: x / Protein: Trace / Nitrite: Negative   Leuk Esterase: Large / RBC: 1 /HPF / WBC 89 /HPF   Sq Epi: x / Non Sq Epi: 0 /HPF / Bacteria: Many                              13.6   5.37  )-----------( 119      ( 28 Mar 2021 07:04 )             42.5                         12.8   4.59  )-----------( 123      ( 27 Mar 2021 08:29 )             41.1                         13.2   5.48  )-----------( 165      ( 26 Mar 2021 18:18 )             42.9     CAPILLARY BLOOD GLUCOSE          RADIOLOGY & ADDITIONAL TESTS:    Imaging Personally Reviewed:  [x ] YES  [ ] NO    Consultants involved in case:   Consultant(s) Notes Reviewed:  [ x] YES  [ ] NO:   Care Discussed with Consultants/Other Providers [x ] YES  [ ] NO

## 2021-03-28 NOTE — PROGRESS NOTE ADULT - PROBLEM SELECTOR PLAN 9
Per wife, patient is baseline AAOx2-3.   Now AAOx3 on admission without any acute changes in mental status.

## 2021-03-29 ENCOUNTER — TRANSCRIPTION ENCOUNTER (OUTPATIENT)
Age: 81
End: 2021-03-29

## 2021-03-29 ENCOUNTER — NON-APPOINTMENT (OUTPATIENT)
Age: 81
End: 2021-03-29

## 2021-03-29 DIAGNOSIS — E86.0 DEHYDRATION: ICD-10-CM

## 2021-03-29 LAB
-  AMIKACIN: SIGNIFICANT CHANGE UP
-  AMOXICILLIN/CLAVULANIC ACID: SIGNIFICANT CHANGE UP
-  AMPICILLIN/SULBACTAM: SIGNIFICANT CHANGE UP
-  AMPICILLIN: SIGNIFICANT CHANGE UP
-  AZTREONAM: SIGNIFICANT CHANGE UP
-  CEFAZOLIN: SIGNIFICANT CHANGE UP
-  CEFEPIME: SIGNIFICANT CHANGE UP
-  CEFOXITIN: SIGNIFICANT CHANGE UP
-  CEFTRIAXONE: SIGNIFICANT CHANGE UP
-  CIPROFLOXACIN: SIGNIFICANT CHANGE UP
-  ERTAPENEM: SIGNIFICANT CHANGE UP
-  GENTAMICIN: SIGNIFICANT CHANGE UP
-  IMIPENEM: SIGNIFICANT CHANGE UP
-  LEVOFLOXACIN: SIGNIFICANT CHANGE UP
-  MEROPENEM: SIGNIFICANT CHANGE UP
-  NITROFURANTOIN: SIGNIFICANT CHANGE UP
-  PIPERACILLIN/TAZOBACTAM: SIGNIFICANT CHANGE UP
-  TIGECYCLINE: SIGNIFICANT CHANGE UP
-  TOBRAMYCIN: SIGNIFICANT CHANGE UP
-  TRIMETHOPRIM/SULFAMETHOXAZOLE: SIGNIFICANT CHANGE UP
ALBUMIN SERPL ELPH-MCNC: 3.2 G/DL — LOW (ref 3.3–5)
ALP SERPL-CCNC: 224 U/L — HIGH (ref 40–120)
ALT FLD-CCNC: 18 U/L — SIGNIFICANT CHANGE UP (ref 4–41)
ANION GAP SERPL CALC-SCNC: 12 MMOL/L — SIGNIFICANT CHANGE UP (ref 7–14)
AST SERPL-CCNC: 32 U/L — SIGNIFICANT CHANGE UP (ref 4–40)
BASOPHILS # BLD AUTO: 0.06 K/UL — SIGNIFICANT CHANGE UP (ref 0–0.2)
BASOPHILS NFR BLD AUTO: 1 % — SIGNIFICANT CHANGE UP (ref 0–2)
BILIRUB SERPL-MCNC: 0.8 MG/DL — SIGNIFICANT CHANGE UP (ref 0.2–1.2)
BUN SERPL-MCNC: 16 MG/DL — SIGNIFICANT CHANGE UP (ref 7–23)
CALCIUM SERPL-MCNC: 9.2 MG/DL — SIGNIFICANT CHANGE UP (ref 8.4–10.5)
CHLORIDE SERPL-SCNC: 102 MMOL/L — SIGNIFICANT CHANGE UP (ref 98–107)
CO2 SERPL-SCNC: 23 MMOL/L — SIGNIFICANT CHANGE UP (ref 22–31)
CREAT SERPL-MCNC: 0.96 MG/DL — SIGNIFICANT CHANGE UP (ref 0.5–1.3)
CULTURE RESULTS: SIGNIFICANT CHANGE UP
EOSINOPHIL # BLD AUTO: 0.53 K/UL — HIGH (ref 0–0.5)
EOSINOPHIL NFR BLD AUTO: 8.8 % — HIGH (ref 0–6)
GLUCOSE SERPL-MCNC: 88 MG/DL — SIGNIFICANT CHANGE UP (ref 70–99)
HCT VFR BLD CALC: 42.9 % — SIGNIFICANT CHANGE UP (ref 39–50)
HGB BLD-MCNC: 13.5 G/DL — SIGNIFICANT CHANGE UP (ref 13–17)
IANC: 2.89 K/UL — SIGNIFICANT CHANGE UP (ref 1.5–8.5)
IMM GRANULOCYTES NFR BLD AUTO: 0.3 % — SIGNIFICANT CHANGE UP (ref 0–1.5)
LYMPHOCYTES # BLD AUTO: 1.96 K/UL — SIGNIFICANT CHANGE UP (ref 1–3.3)
LYMPHOCYTES # BLD AUTO: 32.6 % — SIGNIFICANT CHANGE UP (ref 13–44)
MAGNESIUM SERPL-MCNC: 2 MG/DL — SIGNIFICANT CHANGE UP (ref 1.6–2.6)
MCHC RBC-ENTMCNC: 26.4 PG — LOW (ref 27–34)
MCHC RBC-ENTMCNC: 31.5 GM/DL — LOW (ref 32–36)
MCV RBC AUTO: 84 FL — SIGNIFICANT CHANGE UP (ref 80–100)
METHOD TYPE: SIGNIFICANT CHANGE UP
MONOCYTES # BLD AUTO: 0.56 K/UL — SIGNIFICANT CHANGE UP (ref 0–0.9)
MONOCYTES NFR BLD AUTO: 9.3 % — SIGNIFICANT CHANGE UP (ref 2–14)
NEUTROPHILS # BLD AUTO: 2.89 K/UL — SIGNIFICANT CHANGE UP (ref 1.8–7.4)
NEUTROPHILS NFR BLD AUTO: 48 % — SIGNIFICANT CHANGE UP (ref 43–77)
NRBC # BLD: 0 /100 WBCS — SIGNIFICANT CHANGE UP
NRBC # FLD: 0 K/UL — SIGNIFICANT CHANGE UP
ORGANISM # SPEC MICROSCOPIC CNT: SIGNIFICANT CHANGE UP
ORGANISM # SPEC MICROSCOPIC CNT: SIGNIFICANT CHANGE UP
PHOSPHATE SERPL-MCNC: 2.6 MG/DL — SIGNIFICANT CHANGE UP (ref 2.5–4.5)
PLATELET # BLD AUTO: 119 K/UL — LOW (ref 150–400)
POTASSIUM SERPL-MCNC: 4.2 MMOL/L — SIGNIFICANT CHANGE UP (ref 3.5–5.3)
POTASSIUM SERPL-SCNC: 4.2 MMOL/L — SIGNIFICANT CHANGE UP (ref 3.5–5.3)
PROT SERPL-MCNC: 6.9 G/DL — SIGNIFICANT CHANGE UP (ref 6–8.3)
RBC # BLD: 5.11 M/UL — SIGNIFICANT CHANGE UP (ref 4.2–5.8)
RBC # FLD: 17.6 % — HIGH (ref 10.3–14.5)
SODIUM SERPL-SCNC: 137 MMOL/L — SIGNIFICANT CHANGE UP (ref 135–145)
SPECIMEN SOURCE: SIGNIFICANT CHANGE UP
WBC # BLD: 6.02 K/UL — SIGNIFICANT CHANGE UP (ref 3.8–10.5)
WBC # FLD AUTO: 6.02 K/UL — SIGNIFICANT CHANGE UP (ref 3.8–10.5)

## 2021-03-29 PROCEDURE — 99232 SBSQ HOSP IP/OBS MODERATE 35: CPT | Mod: GC

## 2021-03-29 RX ORDER — LIDOCAINE 4 G/100G
1 CREAM TOPICAL ONCE
Refills: 0 | Status: COMPLETED | OUTPATIENT
Start: 2021-03-29 | End: 2021-03-29

## 2021-03-29 RX ORDER — FUROSEMIDE 40 MG
20 TABLET ORAL DAILY
Refills: 0 | Status: ACTIVE | OUTPATIENT
Start: 2021-03-29 | End: 2022-02-25

## 2021-03-29 RX ORDER — ENOXAPARIN SODIUM 100 MG/ML
40 INJECTION SUBCUTANEOUS DAILY
Refills: 0 | Status: DISCONTINUED | OUTPATIENT
Start: 2021-03-29 | End: 2021-04-02

## 2021-03-29 RX ADMIN — LIDOCAINE 1 PATCH: 4 CREAM TOPICAL at 09:41

## 2021-03-29 RX ADMIN — GABAPENTIN 100 MILLIGRAM(S): 400 CAPSULE ORAL at 14:11

## 2021-03-29 RX ADMIN — ENOXAPARIN SODIUM 40 MILLIGRAM(S): 100 INJECTION SUBCUTANEOUS at 22:15

## 2021-03-29 RX ADMIN — ERTAPENEM SODIUM 120 MILLIGRAM(S): 1 INJECTION, POWDER, LYOPHILIZED, FOR SOLUTION INTRAMUSCULAR; INTRAVENOUS at 22:11

## 2021-03-29 RX ADMIN — LIDOCAINE 1 PATCH: 4 CREAM TOPICAL at 17:57

## 2021-03-29 RX ADMIN — HEPARIN SODIUM 5000 UNIT(S): 5000 INJECTION INTRAVENOUS; SUBCUTANEOUS at 14:48

## 2021-03-29 RX ADMIN — LACTULOSE 30 GRAM(S): 10 SOLUTION ORAL at 17:25

## 2021-03-29 RX ADMIN — GABAPENTIN 100 MILLIGRAM(S): 400 CAPSULE ORAL at 05:47

## 2021-03-29 RX ADMIN — PANTOPRAZOLE SODIUM 40 MILLIGRAM(S): 20 TABLET, DELAYED RELEASE ORAL at 06:01

## 2021-03-29 RX ADMIN — GABAPENTIN 100 MILLIGRAM(S): 400 CAPSULE ORAL at 22:11

## 2021-03-29 RX ADMIN — LACTULOSE 30 GRAM(S): 10 SOLUTION ORAL at 05:48

## 2021-03-29 RX ADMIN — Medication 1 TABLET(S): at 12:04

## 2021-03-29 RX ADMIN — Medication 500 MILLIGRAM(S): at 12:04

## 2021-03-29 RX ADMIN — LIDOCAINE 1 PATCH: 4 CREAM TOPICAL at 22:00

## 2021-03-29 RX ADMIN — Medication 325 MILLIGRAM(S): at 12:04

## 2021-03-29 RX ADMIN — HEPARIN SODIUM 5000 UNIT(S): 5000 INJECTION INTRAVENOUS; SUBCUTANEOUS at 05:48

## 2021-03-29 RX ADMIN — Medication 20 MILLIGRAM(S): at 12:04

## 2021-03-29 NOTE — PROGRESS NOTE ADULT - SUBJECTIVE AND OBJECTIVE BOX
Alli Dee M.D.  Internal Medicine PGY-1  [phone] [phone] / [id number]       Patient is a 81y old  Male who presents with a chief complaint of Weakness, UTI (28 Mar 2021 08:29)      SUBJECTIVE / OVERNIGHT EVENTS:          MEDICATIONS  (STANDING):  ascorbic acid 500 milliGRAM(s) Oral daily  calcium carbonate 1250 mG  + Vitamin D (OsCal 500 + D) 1 Tablet(s) Oral daily  ertapenem  IVPB 1000 milliGRAM(s) IV Intermittent every 24 hours  ferrous    sulfate 325 milliGRAM(s) Oral daily  gabapentin 100 milliGRAM(s) Oral three times a day  heparin   Injectable 5000 Unit(s) SubCutaneous every 8 hours  influenza  Vaccine (HIGH DOSE) 0.7 milliLiter(s) IntraMuscular once  lactulose Syrup 30 Gram(s) Oral two times a day  multivitamin 1 Tablet(s) Oral daily  pantoprazole    Tablet 40 milliGRAM(s) Oral before breakfast  propranolol 10 milliGRAM(s) Oral two times a day    MEDICATIONS  (PRN):  acetaminophen   Tablet .. 650 milliGRAM(s) Oral every 8 hours PRN Mild Pain (1 - 3), Moderate Pain (4 - 6)      Vital Signs Last 24 Hrs  T(C): 36.6 (29 Mar 2021 05:44), Max: 36.7 (28 Mar 2021 22:28)  T(F): 97.9 (29 Mar 2021 05:44), Max: 98 (28 Mar 2021 22:28)  HR: 68 (29 Mar 2021 05:54) (68 - 91)  BP: 114/64 (29 Mar 2021 05:54) (100/66 - 126/84)  BP(mean): --  RR: 17 (29 Mar 2021 05:44) (17 - 17)  SpO2: 100% (29 Mar 2021 05:44) (97% - 100%)      PHYSICAL EXAM  GENERAL: NAD, well-developed  HEAD:  Atraumatic, Normocephalic  EYES: EOMI, PERRLA, conjunctiva and sclera clear  NECK: Supple, No JVD  CHEST/LUNG: Clear to auscultation bilaterally; No wheeze  HEART: Regular rate and rhythm; No murmurs, rubs, or gallops  ABDOMEN: Soft, Nontender, Nondistended; Bowel sounds present  EXTREMITIES:  2+ Peripheral Pulses, No clubbing, cyanosis, or edema  PSYCH: AAOx3  SKIN: No rashes or lesions    CAPILLARY BLOOD GLUCOSE        I&O's Summary    28 Mar 2021 07:01  -  29 Mar 2021 07:00  --------------------------------------------------------  IN: 0 mL / OUT: 400 mL / NET: -400 mL        LABS:                        13.5   6.02  )-----------( 119      ( 29 Mar 2021 06:36 )             42.9     03-28    141  |  104  |  18  ----------------------------<  90  3.8   |  25  |  0.99    Ca    9.2      28 Mar 2021 07:04  Phos  2.3     03-28  Mg     1.5     03-28    TPro  6.8  /  Alb  3.4  /  TBili  1.0  /  DBili  x   /  AST  26  /  ALT  15  /  AlkPhos  218<H>  03-28              RADIOLOGY & ADDITIONAL TESTS:     MICROBIOLOGY:    ANTIMICROBIALS:    CONSULTS: Alli Dee M.D.  Internal Medicine PGY-1  [phone] / [id number]       Patient is a 81y old  Male who presents with a chief complaint of Weakness, UTI (28 Mar 2021 08:29)      SUBJECTIVE / OVERNIGHT EVENTS:    Patient seen and examined at the bedside this am. Per nursing no acute events overnight. The patient endorses right shoulder and wrist pain 2/2 to his long standing arthritis. The patient endorses not being able to eat adequately due to the pain leading to his poor po intake. The patient otherwise is endorsing weakness, states that he had a BM yesterday and is having consistent BMs without evidence of BRBPR or melanotic stools. Denies any dysuria, chest pain, n/v/d.       MEDICATIONS  (STANDING):  ascorbic acid 500 milliGRAM(s) Oral daily  calcium carbonate 1250 mG  + Vitamin D (OsCal 500 + D) 1 Tablet(s) Oral daily  ertapenem  IVPB 1000 milliGRAM(s) IV Intermittent every 24 hours  ferrous    sulfate 325 milliGRAM(s) Oral daily  gabapentin 100 milliGRAM(s) Oral three times a day  heparin   Injectable 5000 Unit(s) SubCutaneous every 8 hours  influenza  Vaccine (HIGH DOSE) 0.7 milliLiter(s) IntraMuscular once  lactulose Syrup 30 Gram(s) Oral two times a day  multivitamin 1 Tablet(s) Oral daily  pantoprazole    Tablet 40 milliGRAM(s) Oral before breakfast  propranolol 10 milliGRAM(s) Oral two times a day    MEDICATIONS  (PRN):  acetaminophen   Tablet .. 650 milliGRAM(s) Oral every 8 hours PRN Mild Pain (1 - 3), Moderate Pain (4 - 6)      Vital Signs Last 24 Hrs  T(C): 36.6 (29 Mar 2021 05:44), Max: 36.7 (28 Mar 2021 22:28)  T(F): 97.9 (29 Mar 2021 05:44), Max: 98 (28 Mar 2021 22:28)  HR: 68 (29 Mar 2021 05:54) (68 - 91)  BP: 114/64 (29 Mar 2021 05:54) (100/66 - 126/84)  BP(mean): --  RR: 17 (29 Mar 2021 05:44) (17 - 17)  SpO2: 100% (29 Mar 2021 05:44) (97% - 100%)      PHYSICAL EXAM  GENERAL: NAD, well-developed, orthostatics negative 3/28.   HEAD:  Atraumatic, Normocephalic  EYES: EOMI, PERRLA, conjunctiva and sclera clear  NECK: Supple, No JVD  CHEST/LUNG: + fine crackles at the base.   HEART: Regular rate and rhythm; No murmurs, rubs, or gallops  ABDOMEN: +RLQ pain to deep palpation.   EXTREMITIES:  2+ Peripheral Pulses, No clubbing, cyanosis, or edema  PSYCH: AAOx3  SKIN: No rashes or lesions    CAPILLARY BLOOD GLUCOSE        I&O's Summary    28 Mar 2021 07:01  -  29 Mar 2021 07:00  --------------------------------------------------------  IN: 0 mL / OUT: 400 mL / NET: -400 mL        LABS:                        13.5   6.02  )-----------( 119      ( 29 Mar 2021 06:36 )             42.9     03-28    141  |  104  |  18  ----------------------------<  90  3.8   |  25  |  0.99    Ca    9.2      28 Mar 2021 07:04  Phos  2.3     03-28  Mg     1.5     03-28    TPro  6.8  /  Alb  3.4  /  TBili  1.0  /  DBili  x   /  AST  26  /  ALT  15  /  AlkPhos  218<H>  03-28              RADIOLOGY & ADDITIONAL TESTS:     MICROBIOLOGY:    ANTIMICROBIALS:    CONSULTS:

## 2021-03-29 NOTE — PROGRESS NOTE ADULT - PROBLEM SELECTOR PLAN 7
Essential hypertension  Patient not on any home anti-hypertensives other than propranolol   - Will continue to monitor, BP

## 2021-03-29 NOTE — PROGRESS NOTE ADULT - PROBLEM SELECTOR PLAN 1
Endorsing dizziness and weakness at home with fall. XR hip negative for acute pathology. + orthostatics in the ED and on the floor. Most likely i/s/o poor PO given pre-renal KAY and hx of poor intake at home with diuretic use for ascites.   -IVF resuscitation s/p 1L NS in ED and albumin x1 here on the floor  -If persistent orthostatic despite adequate hydration will need to assess for other etiologies.  -Check ortostatics today, IVF if needed Endorsing dizziness and weakness at home with fall. XR hip negative for acute pathology. + orthostatics in the ED and on the floor. Most likely i/s/o poor PO given pre-renal KAY and hx of poor intake at home with diuretic use for ascites.   -IVF resuscitation s/p 1L NS in ED and albumin x1 here on the floor  -If persistent orthostatic despite adequate hydration will need to assess for other etiologies.  -Orthostatics resolved.   - Patient with poor PO intake i/s/o R shoulder and wrist pain. Will give lidocaine patch and hot packs. If doesn't resolve will attempt to try analgesia with capsaicin cream.

## 2021-03-29 NOTE — PROGRESS NOTE ADULT - PROBLEM SELECTOR PLAN 5
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   -  (was >400 during previous admission) with normal AST/ALT, normal TBili. Patient has mild tenderness to palpation in RUQ, will continue to monitor. Consider RUQ u/s if new or worsening symptoms  - c/w home lactulose 30g BID  - c/w home propanolol 10mg BID (for varices) with hold parameters for HR<50 and SBP <100.   - holding home furosemide 20mg qd on 3/27 for KAY/orthostatic hypotension. Can consider restarting when patient orthostatic negative. 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   -  (was >400 during previous admission) with normal AST/ALT, normal TBili. Patient has mild tenderness to palpation in RUQ, will continue to monitor. Consider RUQ u/s if new or worsening symptoms  - c/w home lactulose 30g BID  - c/w home propanolol 10mg BID (for varices) with hold parameters for HR<50 and SBP <100.   - restarting home furosemide 20mg qd on 3/29

## 2021-03-29 NOTE — PROGRESS NOTE ADULT - PROBLEM SELECTOR PLAN 10
DVT: heparin subQ  Diet: Regular, no pork  Per prior admission, patient is FULL CODE. DVT: heparin subQ  Diet: Regular, no pork  Per prior admission, patient is FULL CODE.  Awaiting PT recs for placement.

## 2021-03-29 NOTE — PROGRESS NOTE ADULT - ASSESSMENT
Mr. Florez is a 80 yo M with a recent admission for polymicrobial bacteremia 2/2 cholangitis w/gangrenous cholecystitis (2/2021) and a PMH of dementia (AAOx2-3), alcoholic/WALL cirrhosis, HTN, CKD3, RBBB, osteoarthritis, pseudogout, porcelain gallbladder, H. 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

## 2021-03-29 NOTE — DISCHARGE NOTE PROVIDER - NSDCFUADDAPPT_GEN_ALL_CORE_FT
Please follow up with your hepatologist as an outpatient to let them know about your recent hospitalization.     Please follow up with  Please follow up with your hepatologist as an outpatient to let them know about your recent hospitalization. We have held your furosemide on admission please follow up with your hepatologists t    Please follow up with yo Please follow up with your hepatologist as an outpatient to let them know about your recent hospitalization. We have held your furosemide on admission please follow up with your hepatologists on whether you need it continued. We held it as you were becoming hypotensive on the medication here in the hospital.     Please follow up with your primary care physician upon discharge from rehab.

## 2021-03-29 NOTE — DISCHARGE NOTE PROVIDER - NSDCFUSCHEDAPPT_GEN_ALL_CORE_FT
KETAN TIPTON ; 04/27/2021 ; [facility] Hepat[facility] [address]  KETAN TIPTON ; 05/03/2021 ; NPP Hepat[facility] [address] [facility]  KETAN TIPTON ; 05/04/2021 ; NPP Med Gastro [address] KETAN TIPTON ; 05/03/2021 ; NPP Hepatology [address] [facility]  KETAN TIPTON ; 05/04/2021 ; NPP Med Gastro [address]

## 2021-03-29 NOTE — PROGRESS NOTE ADULT - PROBLEM SELECTOR PLAN 2
-Patient with history of ESBL E. Coli UTI, most recently in 2/2021. Patient has +UA with KAY, worsening weakness and fatigue and decreased appetite.   - Will c/w Ertapenem for now. Should treat for 7-10 day course for complicated UTI given previous culture hx (3/27-)  - F/u urine cultures, blood cultures. Blood cultures previously in 02/06/21 with evidence of ESBL e coli along with MDRO Klepne which was resistant to ertapenem and treated with Noa/Vaborbactram.  If patient has positive blood cultures will need ID consult for possible broadening of abx.   -UCX 3/26 > 100k E. coli, f/u sensitivities   - Monitor for fevers, hemodynamic instability

## 2021-03-29 NOTE — PROGRESS NOTE ADULT - ATTENDING COMMENTS
81F with cirrhosis, HTN, presented with weakness due to UTI. Ucx with MDR E.coli, c/w Ertapenem. orthostatic resolved with hydration. resume home lasix. PT eval, likely need rehab. dispo pending PT.

## 2021-03-29 NOTE — DISCHARGE NOTE PROVIDER - NSDCMRMEDTOKEN_GEN_ALL_CORE_FT
ascorbic acid 500 mg oral tablet: 1 tab(s) orally once a day  calcium-vitamin D 500 mg-200 intl units (5 mcg) oral tablet: 1 tab(s) orally once a day  ferrous sulfate 325 mg (65 mg elemental iron) oral tablet: 1 tab(s) orally once a day  furosemide 20 mg oral tablet: 1 tab(s) orally once a day  gabapentin 100 mg oral capsule: 1 cap(s) orally 3 times a day  lactulose 10 g/15 mL oral syrup: 45 milliliter(s) orally 2 times a day  Multiple Vitamins oral tablet: 1 tab(s) orally once a day  pantoprazole 40 mg oral delayed release tablet: 1 tab(s) orally once a day (before a meal)  propranolol 10 mg oral tablet: 1 tab(s) orally 2 times a day   ascorbic acid 500 mg oral tablet: 1 tab(s) orally once a day  calcium-vitamin D 500 mg-200 intl units (5 mcg) oral tablet: 1 tab(s) orally once a day  ferrous sulfate 325 mg (65 mg elemental iron) oral tablet: 1 tab(s) orally once a day  furosemide 20 mg oral tablet: 1 tab(s) orally once a day  gabapentin 100 mg oral capsule: 1 cap(s) orally 3 times a day  lactulose 10 g/15 mL oral syrup: 45 milliliter(s) orally 2 times a day  lidocaine 5% topical film:  topically   Multiple Vitamins oral tablet: 1 tab(s) orally once a day  pantoprazole 40 mg oral delayed release tablet: 1 tab(s) orally once a day (before a meal)  propranolol 10 mg oral tablet: 1 tab(s) orally 2 times a day  SMZ-TMP  mg-160 mg oral tablet: 1 tab(s) orally 2 times a day    ascorbic acid 500 mg oral tablet: 1 tab(s) orally once a day  calcium-vitamin D 500 mg-200 intl units (5 mcg) oral tablet: 1 tab(s) orally once a day  ferrous sulfate 325 mg (65 mg elemental iron) oral tablet: 1 tab(s) orally once a day  gabapentin 100 mg oral capsule: 1 cap(s) orally 3 times a day  lactulose 10 g/15 mL oral syrup: 45 milliliter(s) orally 2 times a day  lidocaine 5% topical film:  topically   Multiple Vitamins oral tablet: 1 tab(s) orally once a day  pantoprazole 40 mg oral delayed release tablet: 1 tab(s) orally once a day (before a meal)  propranolol 10 mg oral tablet: 1 tab(s) orally 2 times a day

## 2021-03-29 NOTE — DISCHARGE NOTE PROVIDER - HOSPITAL COURSE
HPI:     Mr. 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 After discharge patient has been in rehab without any significant issues. History obtained from patient and from his wife Since he came home from rehab this past Monday 3/22 he has been feeling progressively weaker and lightheaded to the point that he is too weak to stand or turn himself in his bed. He has also had decreased appetite since returning from rehab. He has additionally been having dark and loose stools recently, which his wife was concerned about given his extensive PMH including GI bleeding. His wife has been holding his home lactulose due to the diarrhea. He has not had any fevers, chills, chest pain, SOB, cough, dysuria, hematuria, n/v, abdominal pain.    In the ED, he was found to be afebrile and hemodynamically stable. He had no leukocytosis or anemia (Hgb 13.2). His Cr was 1.39 (recent prior baseline ~1-1.3). UA was positive for leuk esterase, WBC, many bacteria. CXR and Hip XR were unremarkable. He was given 1L NS and started on ertapenem. Blood and urine cultures were sent.      Hospital Course:    Here in the hospital the patient's urine culture was positive for ESBL E coli. His blood cultures were negative. He was continued to ertapenem treatment and transitioned to PO abx (bactrim) for a total of seven days therapy. Physical therapy saw him here in the hospital and recommended ..... In addition the patient was found to be orthostatic positive upon admission most likely 2/2 to poor PO intake. He was given 1L of IVF resuscitation in the ED and then another challenge of albumin on the floors  which resolved the patient's orthostatic hypotension. Today the patient is hemodynamically stable and ready for discharge to .... HPI:     Mr. 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 After discharge patient has been in rehab without any significant issues. History obtained from patient and from his wife Since he came home from rehab this past Monday 3/22 he has been feeling progressively weaker and lightheaded to the point that he is too weak to stand or turn himself in his bed. He has also had decreased appetite since returning from rehab. He has additionally been having dark and loose stools recently, which his wife was concerned about given his extensive PMH including GI bleeding. His wife has been holding his home lactulose due to the diarrhea. He has not had any fevers, chills, chest pain, SOB, cough, dysuria, hematuria, n/v, abdominal pain.    In the ED, he was found to be afebrile and hemodynamically stable. He had no leukocytosis or anemia (Hgb 13.2). His Cr was 1.39 (recent prior baseline ~1-1.3). UA was positive for leuk esterase, WBC, many bacteria. CXR and Hip XR were unremarkable. He was given 1L NS and started on ertapenem. Blood and urine cultures were sent.      Hospital Course:    Here in the hospital the patient's urine culture was positive for ESBL E coli. His blood cultures were negative. He was continued to ertapenem treatment and transitioned to PO abx (bactrim) for a total of seven days therapy. Physical therapy saw him here in the hospital and recommended patient be transferred to [facility] upon medical optimization.  In addition the patient was found to be orthostatic positive upon admission most likely 2/2 to poor PO intake. He was given 1L of IVF resuscitation in the ED and then another challenge of albumin on the floors  which resolved the patient's orthostatic hypotension. Today the patient is hemodynamically stable and ready for discharge to subacute rehab. HPI:     Mr. 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 After discharge patient has been in rehab without any significant issues. History obtained from patient and from his wife Since he came home from rehab this past Monday 3/22 he has been feeling progressively weaker and lightheaded to the point that he is too weak to stand or turn himself in his bed. He has also had decreased appetite since returning from rehab. He has additionally been having dark and loose stools recently, which his wife was concerned about given his extensive PMH including GI bleeding. His wife has been holding his home lactulose due to the diarrhea. He has not had any fevers, chills, chest pain, SOB, cough, dysuria, hematuria, n/v, abdominal pain.    In the ED, he was found to be afebrile and hemodynamically stable. He had no leukocytosis or anemia (Hgb 13.2). His Cr was 1.39 (recent prior baseline ~1-1.3). UA was positive for leuk esterase, WBC, many bacteria. CXR and Hip XR were unremarkable. He was given 1L NS and started on ertapenem. Blood and urine cultures were sent.      Hospital Course:    Here in the hospital the patient's urine culture was positive for ESBL E coli. His blood cultures were negative. He was continued to ertapenem treatment and completed a total of seven days therapy. Physical therapy saw him here in the hospital and recommended patient be transferred to [facility] upon medical optimization.  In addition the patient was found to be orthostatic positive upon admission most likely 2/2 to poor PO intake. He was given 1L of IVF resuscitation in the ED and then another challenge of albumin on the floors  which resolved the patient's orthostatic hypotension. Today the patient is hemodynamically stable and ready for discharge to subacute rehab.

## 2021-03-29 NOTE — DISCHARGE NOTE PROVIDER - NSDCCPCAREPLAN_GEN_ALL_CORE_FT
PRINCIPAL DISCHARGE DIAGNOSIS  Diagnosis: UTI (urinary tract infection)  Assessment and Plan of Treatment: You came into the hospital with a urinary tract infection. This is caused by bacteria that invade your urinary tract. We treated you with antibiotics and are transitioning you to oral antibitoics that you should take to complete your course for your urinary tract infection. If you have worsening fevers, nausea, vomting, burning during urination, or increased frequency with urination please come back to the ED as this may be sign that you have worsening infection that requires re-evaluation.      SECONDARY DISCHARGE DIAGNOSES  Diagnosis: Orthostatic hypotension  Assessment and Plan of Treatment: You were found to have orthostatic hypotension. This is basically a drop in your blood pressure when you go from a sleeping position to a sitting and standing position. This may be the reason you have been endorsing worsening dizziness and weakness when attempting to stand. This may be in the setting of your poor oral intake and poor appetite. The treatment is to give you adequate resuscitation with fluids which we did. You ended up just being mildly dehydrated and after fluids we restarted you diuretic medication called lasix. Since you are supposed to be taking your lasix at home we encourage that you continue to keep your oral intake up adequately and if you have worsening dizziness or falls then to come back to the hospital as those may be signs of possible orthostatic hypotension vs. possible something happening inside the brain like a stroke.    Diagnosis: KYA (acute kidney injury)  Assessment and Plan of Treatment:     Diagnosis: Dehydration  Assessment and Plan of Treatment:

## 2021-03-30 LAB
ALBUMIN SERPL ELPH-MCNC: 3.1 G/DL — LOW (ref 3.3–5)
ALP SERPL-CCNC: 229 U/L — HIGH (ref 40–120)
ALT FLD-CCNC: 16 U/L — SIGNIFICANT CHANGE UP (ref 4–41)
ANION GAP SERPL CALC-SCNC: 11 MMOL/L — SIGNIFICANT CHANGE UP (ref 7–14)
AST SERPL-CCNC: 31 U/L — SIGNIFICANT CHANGE UP (ref 4–40)
BASOPHILS # BLD AUTO: 0.07 K/UL — SIGNIFICANT CHANGE UP (ref 0–0.2)
BASOPHILS NFR BLD AUTO: 1.3 % — SIGNIFICANT CHANGE UP (ref 0–2)
BILIRUB SERPL-MCNC: 0.8 MG/DL — SIGNIFICANT CHANGE UP (ref 0.2–1.2)
BUN SERPL-MCNC: 15 MG/DL — SIGNIFICANT CHANGE UP (ref 7–23)
CALCIUM SERPL-MCNC: 9.5 MG/DL — SIGNIFICANT CHANGE UP (ref 8.4–10.5)
CHLORIDE SERPL-SCNC: 101 MMOL/L — SIGNIFICANT CHANGE UP (ref 98–107)
CO2 SERPL-SCNC: 24 MMOL/L — SIGNIFICANT CHANGE UP (ref 22–31)
CREAT SERPL-MCNC: 0.93 MG/DL — SIGNIFICANT CHANGE UP (ref 0.5–1.3)
EOSINOPHIL # BLD AUTO: 0.61 K/UL — HIGH (ref 0–0.5)
EOSINOPHIL NFR BLD AUTO: 11 % — HIGH (ref 0–6)
GLUCOSE SERPL-MCNC: 91 MG/DL — SIGNIFICANT CHANGE UP (ref 70–99)
HCT VFR BLD CALC: 44 % — SIGNIFICANT CHANGE UP (ref 39–50)
HGB BLD-MCNC: 13.9 G/DL — SIGNIFICANT CHANGE UP (ref 13–17)
IANC: 2.49 K/UL — SIGNIFICANT CHANGE UP (ref 1.5–8.5)
IMM GRANULOCYTES NFR BLD AUTO: 0.4 % — SIGNIFICANT CHANGE UP (ref 0–1.5)
LYMPHOCYTES # BLD AUTO: 1.84 K/UL — SIGNIFICANT CHANGE UP (ref 1–3.3)
LYMPHOCYTES # BLD AUTO: 33.1 % — SIGNIFICANT CHANGE UP (ref 13–44)
MAGNESIUM SERPL-MCNC: 1.7 MG/DL — SIGNIFICANT CHANGE UP (ref 1.6–2.6)
MCHC RBC-ENTMCNC: 26.5 PG — LOW (ref 27–34)
MCHC RBC-ENTMCNC: 31.6 GM/DL — LOW (ref 32–36)
MCV RBC AUTO: 83.8 FL — SIGNIFICANT CHANGE UP (ref 80–100)
MONOCYTES # BLD AUTO: 0.53 K/UL — SIGNIFICANT CHANGE UP (ref 0–0.9)
MONOCYTES NFR BLD AUTO: 9.5 % — SIGNIFICANT CHANGE UP (ref 2–14)
NEUTROPHILS # BLD AUTO: 2.49 K/UL — SIGNIFICANT CHANGE UP (ref 1.8–7.4)
NEUTROPHILS NFR BLD AUTO: 44.7 % — SIGNIFICANT CHANGE UP (ref 43–77)
NRBC # BLD: 0 /100 WBCS — SIGNIFICANT CHANGE UP
NRBC # FLD: 0 K/UL — SIGNIFICANT CHANGE UP
PHOSPHATE SERPL-MCNC: 2.8 MG/DL — SIGNIFICANT CHANGE UP (ref 2.5–4.5)
PLATELET # BLD AUTO: 146 K/UL — LOW (ref 150–400)
POTASSIUM SERPL-MCNC: 4.1 MMOL/L — SIGNIFICANT CHANGE UP (ref 3.5–5.3)
POTASSIUM SERPL-SCNC: 4.1 MMOL/L — SIGNIFICANT CHANGE UP (ref 3.5–5.3)
PROT SERPL-MCNC: 6.8 G/DL — SIGNIFICANT CHANGE UP (ref 6–8.3)
RBC # BLD: 5.25 M/UL — SIGNIFICANT CHANGE UP (ref 4.2–5.8)
RBC # FLD: 17.4 % — HIGH (ref 10.3–14.5)
SARS-COV-2 RNA SPEC QL NAA+PROBE: SIGNIFICANT CHANGE UP
SODIUM SERPL-SCNC: 136 MMOL/L — SIGNIFICANT CHANGE UP (ref 135–145)
WBC # BLD: 5.56 K/UL — SIGNIFICANT CHANGE UP (ref 3.8–10.5)
WBC # FLD AUTO: 5.56 K/UL — SIGNIFICANT CHANGE UP (ref 3.8–10.5)

## 2021-03-30 PROCEDURE — 99232 SBSQ HOSP IP/OBS MODERATE 35: CPT | Mod: GC

## 2021-03-30 RX ORDER — LIDOCAINE 4 G/100G
1 CREAM TOPICAL DAILY
Refills: 0 | Status: DISCONTINUED | OUTPATIENT
Start: 2021-03-30 | End: 2021-04-02

## 2021-03-30 RX ORDER — CAPSAICIN 0.025 %
1 CREAM (GRAM) TOPICAL
Refills: 0 | Status: DISCONTINUED | OUTPATIENT
Start: 2021-03-30 | End: 2021-04-02

## 2021-03-30 RX ADMIN — LACTULOSE 30 GRAM(S): 10 SOLUTION ORAL at 17:32

## 2021-03-30 RX ADMIN — ERTAPENEM SODIUM 120 MILLIGRAM(S): 1 INJECTION, POWDER, LYOPHILIZED, FOR SOLUTION INTRAMUSCULAR; INTRAVENOUS at 21:45

## 2021-03-30 RX ADMIN — Medication 1 TABLET(S): at 11:30

## 2021-03-30 RX ADMIN — Medication 500 MILLIGRAM(S): at 11:30

## 2021-03-30 RX ADMIN — LACTULOSE 30 GRAM(S): 10 SOLUTION ORAL at 05:33

## 2021-03-30 RX ADMIN — LIDOCAINE 1 PATCH: 4 CREAM TOPICAL at 19:24

## 2021-03-30 RX ADMIN — Medication 1 TABLET(S): at 11:35

## 2021-03-30 RX ADMIN — GABAPENTIN 100 MILLIGRAM(S): 400 CAPSULE ORAL at 21:45

## 2021-03-30 RX ADMIN — Medication 20 MILLIGRAM(S): at 05:33

## 2021-03-30 RX ADMIN — GABAPENTIN 100 MILLIGRAM(S): 400 CAPSULE ORAL at 05:33

## 2021-03-30 RX ADMIN — ENOXAPARIN SODIUM 40 MILLIGRAM(S): 100 INJECTION SUBCUTANEOUS at 11:30

## 2021-03-30 RX ADMIN — Medication 1 APPLICATION(S): at 14:17

## 2021-03-30 RX ADMIN — LIDOCAINE 1 PATCH: 4 CREAM TOPICAL at 11:35

## 2021-03-30 RX ADMIN — GABAPENTIN 100 MILLIGRAM(S): 400 CAPSULE ORAL at 14:17

## 2021-03-30 RX ADMIN — PANTOPRAZOLE SODIUM 40 MILLIGRAM(S): 20 TABLET, DELAYED RELEASE ORAL at 05:33

## 2021-03-30 RX ADMIN — Medication 325 MILLIGRAM(S): at 11:30

## 2021-03-30 NOTE — PROGRESS NOTE ADULT - PROBLEM SELECTOR PLAN 1
Endorsing dizziness and weakness at home with fall. XR hip negative for acute pathology. + orthostatics in the ED and on the floor. Most likely i/s/o poor PO given pre-renal KAY and hx of poor intake at home with diuretic use for ascites.   -IVF resuscitation s/p 1L NS in ED and albumin x1 here on the floor  -If persistent orthostatic despite adequate hydration will need to assess for other etiologies.  -Orthostatics resolved.   - Patient with poor PO intake i/s/o R shoulder and wrist pain. Will give lidocaine patch and hot packs. If doesn't resolve will attempt to try analgesia with capsaicin cream.

## 2021-03-30 NOTE — PROGRESS NOTE ADULT - ATTENDING COMMENTS
81M presented with weakness, orthostatic hypotension in setting of UTI and diarrhea with concurrent diuretic use. clinically improved. on appropriate abx. PT recs rehab. patient agrees. f/u SW. plan to cont IV abx, transition to Bactrim on discharge.

## 2021-03-30 NOTE — PROGRESS NOTE ADULT - PROBLEM SELECTOR PLAN 5
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   -  (was >400 during previous admission) with normal AST/ALT, normal TBili. Patient has mild tenderness to palpation in RUQ, will continue to monitor. Consider RUQ u/s if new or worsening symptoms  - c/w home lactulose 30g BID  - c/w home propanolol 10mg BID (for varices) with hold parameters for HR<50 and SBP <100.   - restarting home furosemide 20mg qd on 3/29

## 2021-03-30 NOTE — PROGRESS NOTE ADULT - PROBLEM SELECTOR PLAN 2
-Patient with history of ESBL E. Coli UTI, most recently in 2/2021. Patient has +UA with KAY, worsening weakness and fatigue and decreased appetite.   - Will c/w Ertapenem for now. Should treat for 7-10 day course for complicated UTI given previous culture hx (3/27-)  - F/u urine cultures, blood cultures. Blood cultures previously in 02/06/21 with evidence of ESBL e coli along with MDRO Klepne which was resistant to ertapenem and treated with Noa/Vaborbactram.  If patient has positive blood cultures will need ID consult for possible broadening of abx.   -UCX 3/26 > 100k E. coli, f/u sensitivities   - Monitor for fevers, hemodynamic instability -Patient with history of ESBL E. Coli UTI, most recently in 2/2021. Patient has +UA with KAY, worsening weakness and fatigue and decreased appetite.   - Will c/w Ertapenem for now. Should treat for 7-10 day course for complicated UTI given previous culture hx (3/27-)  - F/u urine cultures, blood cultures. Blood cultures previously in 02/06/21 with evidence of ESBL e coli along with MDRO Klepne which was resistant to ertapenem and treated with Noa/Vaborbactram.  If patient has positive blood cultures will need ID consult for possible broadening of abx.   -UCX 3/26 > 100k ESBL E. coli, f/u sensitivities   - Monitor for fevers, hemodynamic instability  - Can transition to bactrim once medically optimized for discharge.

## 2021-03-30 NOTE — PROGRESS NOTE ADULT - SUBJECTIVE AND OBJECTIVE BOX
Alli Dee M.D.  Internal Medicine PGY-1  [phone] / [phone]     Patient is a 81y old  Male who presents with a chief complaint of Weakness, UTI (29 Mar 2021 17:32)      SUBJECTIVE / OVERNIGHT EVENTS:          MEDICATIONS  (STANDING):  ascorbic acid 500 milliGRAM(s) Oral daily  calcium carbonate 1250 mG  + Vitamin D (OsCal 500 + D) 1 Tablet(s) Oral daily  enoxaparin Injectable 40 milliGRAM(s) SubCutaneous daily  ertapenem  IVPB 1000 milliGRAM(s) IV Intermittent every 24 hours  ferrous    sulfate 325 milliGRAM(s) Oral daily  furosemide    Tablet 20 milliGRAM(s) Oral daily  gabapentin 100 milliGRAM(s) Oral three times a day  influenza  Vaccine (HIGH DOSE) 0.7 milliLiter(s) IntraMuscular once  lactulose Syrup 30 Gram(s) Oral two times a day  multivitamin 1 Tablet(s) Oral daily  pantoprazole    Tablet 40 milliGRAM(s) Oral before breakfast  propranolol 10 milliGRAM(s) Oral two times a day    MEDICATIONS  (PRN):  acetaminophen   Tablet .. 650 milliGRAM(s) Oral every 8 hours PRN Mild Pain (1 - 3), Moderate Pain (4 - 6)      Vital Signs Last 24 Hrs  T(C): 36.4 (30 Mar 2021 05:30), Max: 36.7 (29 Mar 2021 23:56)  T(F): 97.5 (30 Mar 2021 05:30), Max: 98.1 (29 Mar 2021 23:56)  HR: 69 (30 Mar 2021 05:30) (69 - 83)  BP: 101/60 (30 Mar 2021 05:30) (101/60 - 114/87)  BP(mean): --  RR: 18 (30 Mar 2021 05:30) (17 - 18)  SpO2: 98% (30 Mar 2021 05:30) (98% - 100%)      PHYSICAL EXAM  GENERAL: NAD, well-developed  HEAD:  Atraumatic, Normocephalic  EYES: EOMI, PERRLA, conjunctiva and sclera clear  NECK: Supple, No JVD  CHEST/LUNG: Clear to auscultation bilaterally; No wheeze  HEART: Regular rate and rhythm; No murmurs, rubs, or gallops  ABDOMEN: Soft, Nontender, Nondistended; Bowel sounds present  EXTREMITIES:  2+ Peripheral Pulses, No clubbing, cyanosis, or edema  PSYCH: AAOx3  SKIN: No rashes or lesions    CAPILLARY BLOOD GLUCOSE        I&O's Summary      LABS:                        13.5   6.02  )-----------( 119      ( 29 Mar 2021 06:36 )             42.9     03-29    137  |  102  |  16  ----------------------------<  88  4.2   |  23  |  0.96    Ca    9.2      29 Mar 2021 06:36  Phos  2.6     03-29  Mg     2.0     03-29    TPro  6.9  /  Alb  3.2<L>  /  TBili  0.8  /  DBili  x   /  AST  32  /  ALT  18  /  AlkPhos  224<H>  03-29              RADIOLOGY & ADDITIONAL TESTS:     MICROBIOLOGY:    ANTIMICROBIALS:    CONSULTS: Alli Dee M.D.  Internal Medicine PGY-1  [phone] / [phone]     Patient is a 81y old  Male who presents with a chief complaint of Weakness, UTI (29 Mar 2021 17:32)      SUBJECTIVE / OVERNIGHT EVENTS:    Patient seen and examined at the bedside this am. Per nursing no acute events overnight. Patient still endorsing left and right wrist pain but states that he felt much relief due to the lidocaine patch overnight and requesting for more of the patches.       MEDICATIONS  (STANDING):  ascorbic acid 500 milliGRAM(s) Oral daily  calcium carbonate 1250 mG  + Vitamin D (OsCal 500 + D) 1 Tablet(s) Oral daily  enoxaparin Injectable 40 milliGRAM(s) SubCutaneous daily  ertapenem  IVPB 1000 milliGRAM(s) IV Intermittent every 24 hours  ferrous    sulfate 325 milliGRAM(s) Oral daily  furosemide    Tablet 20 milliGRAM(s) Oral daily  gabapentin 100 milliGRAM(s) Oral three times a day  influenza  Vaccine (HIGH DOSE) 0.7 milliLiter(s) IntraMuscular once  lactulose Syrup 30 Gram(s) Oral two times a day  multivitamin 1 Tablet(s) Oral daily  pantoprazole    Tablet 40 milliGRAM(s) Oral before breakfast  propranolol 10 milliGRAM(s) Oral two times a day    MEDICATIONS  (PRN):  acetaminophen   Tablet .. 650 milliGRAM(s) Oral every 8 hours PRN Mild Pain (1 - 3), Moderate Pain (4 - 6)      Vital Signs Last 24 Hrs  T(C): 36.4 (30 Mar 2021 05:30), Max: 36.7 (29 Mar 2021 23:56)  T(F): 97.5 (30 Mar 2021 05:30), Max: 98.1 (29 Mar 2021 23:56)  HR: 69 (30 Mar 2021 05:30) (69 - 83)  BP: 101/60 (30 Mar 2021 05:30) (101/60 - 114/87)  BP(mean): --  RR: 18 (30 Mar 2021 05:30) (17 - 18)  SpO2: 98% (30 Mar 2021 05:30) (98% - 100%)      PHYSICAL EXAM  GENERAL: NAD, well-developed, orthostatics negative 3/28.   HEAD:  Atraumatic, Normocephalic  EYES: EOMI, PERRLA, conjunctiva and sclera clear  NECK: Supple, No JVD  CHEST/LUNG: + fine crackles at the base clears with cough.   HEART: Regular rate and rhythm; No murmurs, rubs, or gallops  ABDOMEN: +RLQ pain to deep palpation.   EXTREMITIES:  2+ Peripheral Pulses, No clubbing, cyanosis, or edema  PSYCH: AAOx3  SKIN: No rashes or lesions    CAPILLARY BLOOD GLUCOSE        I&O's Summary      LABS:                        13.5   6.02  )-----------( 119      ( 29 Mar 2021 06:36 )             42.9     03-29    137  |  102  |  16  ----------------------------<  88  4.2   |  23  |  0.96    Ca    9.2      29 Mar 2021 06:36  Phos  2.6     03-29  Mg     2.0     03-29    TPro  6.9  /  Alb  3.2<L>  /  TBili  0.8  /  DBili  x   /  AST  32  /  ALT  18  /  AlkPhos  224<H>  03-29              RADIOLOGY & ADDITIONAL TESTS:     MICROBIOLOGY:    ANTIMICROBIALS:    CONSULTS:

## 2021-03-30 NOTE — PROGRESS NOTE ADULT - PROBLEM SELECTOR PLAN 10
DVT: heparin subQ  Diet: Regular, no pork  Per prior admission, patient is FULL CODE.  Awaiting PT recs for placement. DVT: lovenox.   Diet: Regular, no pork  Per prior admission, patient is FULL CODE.  Awaiting PT recs for placement.

## 2021-03-30 NOTE — PROGRESS NOTE ADULT - ASSESSMENT
Mr. Florez is a 82 yo M with a recent admission for polymicrobial bacteremia 2/2 cholangitis w/gangrenous cholecystitis (2/2021) and a PMH of dementia (AAOx2-3), alcoholic/WALL cirrhosis, HTN, CKD3, RBBB, osteoarthritis, pseudogout, porcelain gallbladder, H. 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  Mr. Florez is a 80 yo M with a recent admission for polymicrobial bacteremia 2/2 cholangitis w/gangrenous cholecystitis (2/2021) and a PMH of dementia (AAOx2-3), alcoholic/WALL cirrhosis, HTN, CKD3, RBBB, osteoarthritis, pseudogout, porcelain gallbladder, H. 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

## 2021-03-30 NOTE — PHYSICAL THERAPY INITIAL EVALUATION ADULT - PERTINENT HX OF CURRENT PROBLEM, REHAB EVAL
Patient is 81 year old male admitted with history of polymicrobial bacteremia 2/2 cholangitis w/gangrenous cholecystitis (2/2021)  alcoholic/WALL cirrhosis, HTN, CKD3, RBBB, osteoarthritis, pseudogout, porcelain gallbladder, H. Pylori, Guillan-[place] s/p influenza vaccine (1996),  presenting with weakness and dark stools, found to have a UTI.

## 2021-03-31 ENCOUNTER — NON-APPOINTMENT (OUTPATIENT)
Age: 81
End: 2021-03-31

## 2021-03-31 LAB
ALBUMIN SERPL ELPH-MCNC: 3.1 G/DL — LOW (ref 3.3–5)
ALP SERPL-CCNC: 232 U/L — HIGH (ref 40–120)
ALT FLD-CCNC: 17 U/L — SIGNIFICANT CHANGE UP (ref 4–41)
ANION GAP SERPL CALC-SCNC: 10 MMOL/L — SIGNIFICANT CHANGE UP (ref 7–14)
AST SERPL-CCNC: 33 U/L — SIGNIFICANT CHANGE UP (ref 4–40)
BASOPHILS # BLD AUTO: 0.04 K/UL — SIGNIFICANT CHANGE UP (ref 0–0.2)
BASOPHILS NFR BLD AUTO: 0.8 % — SIGNIFICANT CHANGE UP (ref 0–2)
BILIRUB SERPL-MCNC: 0.8 MG/DL — SIGNIFICANT CHANGE UP (ref 0.2–1.2)
BUN SERPL-MCNC: 15 MG/DL — SIGNIFICANT CHANGE UP (ref 7–23)
CALCIUM SERPL-MCNC: 9.5 MG/DL — SIGNIFICANT CHANGE UP (ref 8.4–10.5)
CHLORIDE SERPL-SCNC: 101 MMOL/L — SIGNIFICANT CHANGE UP (ref 98–107)
CO2 SERPL-SCNC: 25 MMOL/L — SIGNIFICANT CHANGE UP (ref 22–31)
CREAT SERPL-MCNC: 0.95 MG/DL — SIGNIFICANT CHANGE UP (ref 0.5–1.3)
EOSINOPHIL # BLD AUTO: 0.51 K/UL — HIGH (ref 0–0.5)
EOSINOPHIL NFR BLD AUTO: 10.4 % — HIGH (ref 0–6)
GLUCOSE SERPL-MCNC: 86 MG/DL — SIGNIFICANT CHANGE UP (ref 70–99)
HCT VFR BLD CALC: 44.4 % — SIGNIFICANT CHANGE UP (ref 39–50)
HGB BLD-MCNC: 14.2 G/DL — SIGNIFICANT CHANGE UP (ref 13–17)
IANC: 2.35 K/UL — SIGNIFICANT CHANGE UP (ref 1.5–8.5)
IMM GRANULOCYTES NFR BLD AUTO: 0.4 % — SIGNIFICANT CHANGE UP (ref 0–1.5)
LYMPHOCYTES # BLD AUTO: 1.46 K/UL — SIGNIFICANT CHANGE UP (ref 1–3.3)
LYMPHOCYTES # BLD AUTO: 29.8 % — SIGNIFICANT CHANGE UP (ref 13–44)
MAGNESIUM SERPL-MCNC: 1.6 MG/DL — SIGNIFICANT CHANGE UP (ref 1.6–2.6)
MCHC RBC-ENTMCNC: 26.7 PG — LOW (ref 27–34)
MCHC RBC-ENTMCNC: 32 GM/DL — SIGNIFICANT CHANGE UP (ref 32–36)
MCV RBC AUTO: 83.6 FL — SIGNIFICANT CHANGE UP (ref 80–100)
MONOCYTES # BLD AUTO: 0.52 K/UL — SIGNIFICANT CHANGE UP (ref 0–0.9)
MONOCYTES NFR BLD AUTO: 10.6 % — SIGNIFICANT CHANGE UP (ref 2–14)
NEUTROPHILS # BLD AUTO: 2.35 K/UL — SIGNIFICANT CHANGE UP (ref 1.8–7.4)
NEUTROPHILS NFR BLD AUTO: 48 % — SIGNIFICANT CHANGE UP (ref 43–77)
NRBC # BLD: 0 /100 WBCS — SIGNIFICANT CHANGE UP
NRBC # FLD: 0 K/UL — SIGNIFICANT CHANGE UP
PHOSPHATE SERPL-MCNC: 3.1 MG/DL — SIGNIFICANT CHANGE UP (ref 2.5–4.5)
PLATELET # BLD AUTO: 163 K/UL — SIGNIFICANT CHANGE UP (ref 150–400)
POTASSIUM SERPL-MCNC: 4.4 MMOL/L — SIGNIFICANT CHANGE UP (ref 3.5–5.3)
POTASSIUM SERPL-SCNC: 4.4 MMOL/L — SIGNIFICANT CHANGE UP (ref 3.5–5.3)
PROT SERPL-MCNC: 6.9 G/DL — SIGNIFICANT CHANGE UP (ref 6–8.3)
RBC # BLD: 5.31 M/UL — SIGNIFICANT CHANGE UP (ref 4.2–5.8)
RBC # FLD: 17.3 % — HIGH (ref 10.3–14.5)
SARS-COV-2 RNA SPEC QL NAA+PROBE: SIGNIFICANT CHANGE UP
SODIUM SERPL-SCNC: 136 MMOL/L — SIGNIFICANT CHANGE UP (ref 135–145)
WBC # BLD: 4.9 K/UL — SIGNIFICANT CHANGE UP (ref 3.8–10.5)
WBC # FLD AUTO: 4.9 K/UL — SIGNIFICANT CHANGE UP (ref 3.8–10.5)

## 2021-03-31 PROCEDURE — 99232 SBSQ HOSP IP/OBS MODERATE 35: CPT | Mod: GC

## 2021-03-31 RX ORDER — AZTREONAM 2 G
1 VIAL (EA) INJECTION
Qty: 4 | Refills: 0
Start: 2021-03-31 | End: 2021-04-01

## 2021-03-31 RX ORDER — LIDOCAINE 4 G/100G
0 CREAM TOPICAL
Qty: 0 | Refills: 0 | DISCHARGE
Start: 2021-03-31

## 2021-03-31 RX ADMIN — PANTOPRAZOLE SODIUM 40 MILLIGRAM(S): 20 TABLET, DELAYED RELEASE ORAL at 06:19

## 2021-03-31 RX ADMIN — ERTAPENEM SODIUM 120 MILLIGRAM(S): 1 INJECTION, POWDER, LYOPHILIZED, FOR SOLUTION INTRAMUSCULAR; INTRAVENOUS at 21:21

## 2021-03-31 RX ADMIN — LACTULOSE 30 GRAM(S): 10 SOLUTION ORAL at 06:19

## 2021-03-31 RX ADMIN — Medication 1 APPLICATION(S): at 11:39

## 2021-03-31 RX ADMIN — GABAPENTIN 100 MILLIGRAM(S): 400 CAPSULE ORAL at 21:21

## 2021-03-31 RX ADMIN — Medication 325 MILLIGRAM(S): at 11:39

## 2021-03-31 RX ADMIN — Medication 1 TABLET(S): at 11:39

## 2021-03-31 RX ADMIN — ENOXAPARIN SODIUM 40 MILLIGRAM(S): 100 INJECTION SUBCUTANEOUS at 11:39

## 2021-03-31 RX ADMIN — LACTULOSE 30 GRAM(S): 10 SOLUTION ORAL at 17:15

## 2021-03-31 RX ADMIN — LIDOCAINE 1 PATCH: 4 CREAM TOPICAL at 11:43

## 2021-03-31 RX ADMIN — Medication 20 MILLIGRAM(S): at 06:19

## 2021-03-31 RX ADMIN — GABAPENTIN 100 MILLIGRAM(S): 400 CAPSULE ORAL at 12:12

## 2021-03-31 RX ADMIN — GABAPENTIN 100 MILLIGRAM(S): 400 CAPSULE ORAL at 06:18

## 2021-03-31 RX ADMIN — Medication 500 MILLIGRAM(S): at 11:39

## 2021-03-31 RX ADMIN — LIDOCAINE 1 PATCH: 4 CREAM TOPICAL at 18:23

## 2021-03-31 NOTE — PROGRESS NOTE ADULT - PROBLEM SELECTOR PLAN 8
Patient with arthritis with mild chronic joint pain   - Will give Tylenol PRN q8h, max 2000 mg/day given cirrhosis Patient with arthritis with mild chronic joint pain   - Will give Tylenol PRN q8h, max 2000 mg/day given cirrhosis  - Lidocaine patches and capsaicin cream ordered.

## 2021-03-31 NOTE — PROGRESS NOTE ADULT - ASSESSMENT
Mr. Florez is a 82 yo M with a recent admission for polymicrobial bacteremia 2/2 cholangitis w/gangrenous cholecystitis (2/2021) and a PMH of dementia (AAOx2-3), alcoholic/WALL cirrhosis, HTN, CKD3, RBBB, osteoarthritis, pseudogout, porcelain gallbladder, H. 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  Mr. Florez is a 80 yo M with a recent admission for polymicrobial bacteremia 2/2 cholangitis w/gangrenous cholecystitis (2/2021) and a PMH of dementia (AAOx2-3), alcoholic/WALL cirrhosis, HTN, CKD3, RBBB, osteoarthritis, pseudogout, porcelain gallbladder, H. 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

## 2021-03-31 NOTE — PROGRESS NOTE ADULT - PROBLEM SELECTOR PLAN 2
-Patient with history of ESBL E. Coli UTI, most recently in 2/2021. Patient has +UA with KAY, worsening weakness and fatigue and decreased appetite.   - Will c/w Ertapenem for now. Should treat for 7-10 day course for complicated UTI given previous culture hx (3/27-)  - F/u urine cultures, blood cultures. Blood cultures previously in 02/06/21 with evidence of ESBL e coli along with MDRO Klepne which was resistant to ertapenem and treated with Noa/Vaborbactram.  If patient has positive blood cultures will need ID consult for possible broadening of abx.   -UCX 3/26 > 100k ESBL E. coli, f/u sensitivities   - Monitor for fevers, hemodynamic instability  - Can transition to bactrim once medically optimized for discharge.

## 2021-03-31 NOTE — PROGRESS NOTE ADULT - PROBLEM SELECTOR PLAN 10
DVT: lovenox.   Diet: Regular, no pork  Per prior admission, patient is FULL CODE.  Awaiting PT recs for placement.

## 2021-03-31 NOTE — PROGRESS NOTE ADULT - SUBJECTIVE AND OBJECTIVE BOX
Alli Dee M.D.  Internal Medicine PGY-1  [phone] / [phone]      Alli Dee M.D.  Internal Medicine PGY-1  [phone]     Patient is a 81y old  Male who presents with a chief complaint of Weakness, UTI (31 Mar 2021 07:18)      SUBJECTIVE / OVERNIGHT EVENTS:  No acute events overnight. Patient seen and examined in AM.     Patient denied fevers, CP, SOB, lower extremity pain.     MEDICATIONS  (STANDING):  ascorbic acid 500 milliGRAM(s) Oral daily  calcium carbonate 1250 mG  + Vitamin D (OsCal 500 + D) 1 Tablet(s) Oral daily  capsaicin HP 0.075% Cream 1 Application(s) Topical <User Schedule>  enoxaparin Injectable 40 milliGRAM(s) SubCutaneous daily  ertapenem  IVPB 1000 milliGRAM(s) IV Intermittent every 24 hours  ferrous    sulfate 325 milliGRAM(s) Oral daily  furosemide    Tablet 20 milliGRAM(s) Oral daily  gabapentin 100 milliGRAM(s) Oral three times a day  influenza  Vaccine (HIGH DOSE) 0.7 milliLiter(s) IntraMuscular once  lactulose Syrup 30 Gram(s) Oral two times a day  lidocaine   Patch 1 Patch Transdermal daily  multivitamin 1 Tablet(s) Oral daily  pantoprazole    Tablet 40 milliGRAM(s) Oral before breakfast  propranolol 10 milliGRAM(s) Oral two times a day    MEDICATIONS  (PRN):  acetaminophen   Tablet .. 650 milliGRAM(s) Oral every 8 hours PRN Mild Pain (1 - 3), Moderate Pain (4 - 6)      REVIEW OF SYSTEMS:    CONSTITUTIONAL: No weakness, fevers or chills  EYES/ENT: No visual changes;  No vertigo or throat pain   NECK: No pain or stiffness  RESPIRATORY: No cough, wheezing, hemoptysis; No shortness of breath  CARDIOVASCULAR: No chest pain or palpitations  GASTROINTESTINAL: No abdominal or epigastric pain. No nausea, vomiting, or hematemesis; No diarrhea or constipation. No melena or hematochezia.  GENITOURINARY: No dysuria, frequency or hematuria  NEUROLOGICAL: No numbness or weakness  SKIN: No itching, burning, rashes, or lesions   All other review of systems negative unless indicated above.    CAPILLARY BLOOD GLUCOSE        I&O's Summary      Vital Signs Last 24 Hrs  T(C): 36.7 (31 Mar 2021 06:16), Max: 36.7 (31 Mar 2021 06:16)  T(F): 98 (31 Mar 2021 06:16), Max: 98 (31 Mar 2021 06:16)  HR: 86 (31 Mar 2021 06:16) (77 - 87)  BP: 107/85 (31 Mar 2021 06:16) (106/65 - 122/81)  BP(mean): --  RR: 18 (31 Mar 2021 06:16) (17 - 18)  SpO2: 97% (31 Mar 2021 06:16) (97% - 100%)    PHYSICAL EXAM:  General: NAD, well-developed  HEENT: EOMI, moist mucous membranes  Neck: Supple, No JVD  Chest/Lung: Clear to auscultation bilaterally; No wheezes, rales or rhonchi  Heart: Regular rate and rhythm; No murmurs, rubs, or gallops. Capillary refill WNL  Abdomen: Soft, Nontender, Nondistended; Bowel sounds present  Extremities: No lower extremity edema.   Psych: AAOx3  Neurology: non-focal, strength and sensation grossly intact UE and LE b/l. No spinal TTP  Skin: No rashes or lesions    LABS:                        13.9   5.56  )-----------( 146      ( 30 Mar 2021 08:28 )             44.0       03-30    136  |  101  |  15  ----------------------------<  91  4.1   |  24  |  0.93    Ca    9.5      30 Mar 2021 07:37  Phos  2.8     03-30  Mg     1.7     03-30    TPro  6.8  /  Alb  3.1<L>  /  TBili  0.8  /  DBili  x   /  AST  31  /  ALT  16  /  AlkPhos  229<H>  03-30                        EKG:   CXR:       RADIOLOGY & ADDITIONAL TESTS:    Imaging Personally Reviewed:    Consultant(s) Notes Reviewed:      Care Discussed with Consultants/Other Providers:   Alli Dee M.D.  Internal Medicine PGY-1  [phone] / [phone]     Patient is a 81y old  Male who presents with a chief complaint of Weakness, UTI (31 Mar 2021 07:18)      SUBJECTIVE / OVERNIGHT EVENTS:  No acute events overnight. Patient seen and examined in AM. Per nursing no acute events overnight. The patient endorses weakness and hand pain that is alleviated with his lidocaine patch. He is otherwise in no acute distress. He denies any n/v/d, fevers, chills. He is having bowel movements with his lactulose and no longer endorsing any dizziness and vertigo.     Patient denied fevers, CP, SOB, lower extremity pain.     MEDICATIONS  (STANDING):  ascorbic acid 500 milliGRAM(s) Oral daily  calcium carbonate 1250 mG  + Vitamin D (OsCal 500 + D) 1 Tablet(s) Oral daily  capsaicin HP 0.075% Cream 1 Application(s) Topical <User Schedule>  enoxaparin Injectable 40 milliGRAM(s) SubCutaneous daily  ertapenem  IVPB 1000 milliGRAM(s) IV Intermittent every 24 hours  ferrous    sulfate 325 milliGRAM(s) Oral daily  furosemide    Tablet 20 milliGRAM(s) Oral daily  gabapentin 100 milliGRAM(s) Oral three times a day  influenza  Vaccine (HIGH DOSE) 0.7 milliLiter(s) IntraMuscular once  lactulose Syrup 30 Gram(s) Oral two times a day  lidocaine   Patch 1 Patch Transdermal daily  multivitamin 1 Tablet(s) Oral daily  pantoprazole    Tablet 40 milliGRAM(s) Oral before breakfast  propranolol 10 milliGRAM(s) Oral two times a day    MEDICATIONS  (PRN):  acetaminophen   Tablet .. 650 milliGRAM(s) Oral every 8 hours PRN Mild Pain (1 - 3), Moderate Pain (4 - 6)            CAPILLARY BLOOD GLUCOSE        I&O's Summary      Vital Signs Last 24 Hrs  T(C): 36.7 (31 Mar 2021 06:16), Max: 36.7 (31 Mar 2021 06:16)  T(F): 98 (31 Mar 2021 06:16), Max: 98 (31 Mar 2021 06:16)  HR: 86 (31 Mar 2021 06:16) (77 - 87)  BP: 107/85 (31 Mar 2021 06:16) (106/65 - 122/81)  BP(mean): --  RR: 18 (31 Mar 2021 06:16) (17 - 18)  SpO2: 97% (31 Mar 2021 06:16) (97% - 100%)    PHYSICAL EXAM  GENERAL: NAD, well-developed, orthostatics negative 3/28.   HEAD:  Atraumatic, Normocephalic  EYES: EOMI, PERRLA, conjunctiva and sclera clear  NECK: Supple, No JVD  CHEST/LUNG: + fine crackles at the base clears with cough.   HEART: Regular rate and rhythm; No murmurs, rubs, or gallops  ABDOMEN: +RLQ pain to deep palpation.   EXTREMITIES:  2+ Peripheral Pulses, No clubbing, cyanosis, or edema  PSYCH: AAOx3  SKIN: No rashes or lesions      LABS:                        13.9   5.56  )-----------( 146      ( 30 Mar 2021 08:28 )             44.0       03-30    136  |  101  |  15  ----------------------------<  91  4.1   |  24  |  0.93    Ca    9.5      30 Mar 2021 07:37  Phos  2.8     03-30  Mg     1.7     03-30    TPro  6.8  /  Alb  3.1<L>  /  TBili  0.8  /  DBili  x   /  AST  31  /  ALT  16  /  AlkPhos  229<H>  03-30                        EKG:   CXR:       RADIOLOGY & ADDITIONAL TESTS:    Imaging Personally Reviewed:    Consultant(s) Notes Reviewed:      Care Discussed with Consultants/Other Providers:

## 2021-03-31 NOTE — PROGRESS NOTE ADULT - ATTENDING COMMENTS
81M with UTI, KAY, orthostasis. clinically at baseline. awaiting rehab placement. transition to bactrim on discharge to complete course of abx for MDRO UTI.

## 2021-04-01 LAB
CULTURE RESULTS: SIGNIFICANT CHANGE UP
CULTURE RESULTS: SIGNIFICANT CHANGE UP
SPECIMEN SOURCE: SIGNIFICANT CHANGE UP
SPECIMEN SOURCE: SIGNIFICANT CHANGE UP

## 2021-04-01 PROCEDURE — 99232 SBSQ HOSP IP/OBS MODERATE 35: CPT | Mod: GC

## 2021-04-01 RX ORDER — ALBUMIN HUMAN 25 %
500 VIAL (ML) INTRAVENOUS ONCE
Refills: 0 | Status: COMPLETED | OUTPATIENT
Start: 2021-04-01 | End: 2021-04-01

## 2021-04-01 RX ORDER — SODIUM CHLORIDE 9 MG/ML
500 INJECTION INTRAMUSCULAR; INTRAVENOUS; SUBCUTANEOUS ONCE
Refills: 0 | Status: COMPLETED | OUTPATIENT
Start: 2021-04-01 | End: 2021-04-01

## 2021-04-01 RX ADMIN — LACTULOSE 30 GRAM(S): 10 SOLUTION ORAL at 06:11

## 2021-04-01 RX ADMIN — Medication 1 TABLET(S): at 11:18

## 2021-04-01 RX ADMIN — LIDOCAINE 1 PATCH: 4 CREAM TOPICAL at 11:20

## 2021-04-01 RX ADMIN — Medication 500 MILLIGRAM(S): at 11:18

## 2021-04-01 RX ADMIN — LIDOCAINE 1 PATCH: 4 CREAM TOPICAL at 18:00

## 2021-04-01 RX ADMIN — Medication 325 MILLIGRAM(S): at 11:18

## 2021-04-01 RX ADMIN — PANTOPRAZOLE SODIUM 40 MILLIGRAM(S): 20 TABLET, DELAYED RELEASE ORAL at 06:11

## 2021-04-01 RX ADMIN — GABAPENTIN 100 MILLIGRAM(S): 400 CAPSULE ORAL at 13:11

## 2021-04-01 RX ADMIN — ERTAPENEM SODIUM 120 MILLIGRAM(S): 1 INJECTION, POWDER, LYOPHILIZED, FOR SOLUTION INTRAMUSCULAR; INTRAVENOUS at 22:44

## 2021-04-01 RX ADMIN — Medication 650 MILLIGRAM(S): at 06:29

## 2021-04-01 RX ADMIN — Medication 1 APPLICATION(S): at 11:20

## 2021-04-01 RX ADMIN — GABAPENTIN 100 MILLIGRAM(S): 400 CAPSULE ORAL at 22:44

## 2021-04-01 RX ADMIN — GABAPENTIN 100 MILLIGRAM(S): 400 CAPSULE ORAL at 06:11

## 2021-04-01 RX ADMIN — ENOXAPARIN SODIUM 40 MILLIGRAM(S): 100 INJECTION SUBCUTANEOUS at 11:18

## 2021-04-01 RX ADMIN — SODIUM CHLORIDE 1000 MILLILITER(S): 9 INJECTION INTRAMUSCULAR; INTRAVENOUS; SUBCUTANEOUS at 06:47

## 2021-04-01 RX ADMIN — LACTULOSE 30 GRAM(S): 10 SOLUTION ORAL at 17:43

## 2021-04-01 RX ADMIN — Medication 250 MILLILITER(S): at 11:19

## 2021-04-01 NOTE — PROGRESS NOTE ADULT - PROBLEM SELECTOR PLAN 8
Patient with arthritis with mild chronic joint pain   - Will give Tylenol PRN q8h, max 2000 mg/day given cirrhosis  - Lidocaine patches and capsaicin cream ordered.

## 2021-04-01 NOTE — PROGRESS NOTE ADULT - PROBLEM SELECTOR PLAN 10
DVT: lovenox.   Diet: Regular, no pork  Per prior admission, patient is FULL CODE.  Awaiting PT recs for placement. DVT: lovenox.   Diet: Regular, no pork  Per prior admission, patient is FULL CODE.  PT recs [facility]

## 2021-04-01 NOTE — PROGRESS NOTE ADULT - PROBLEM SELECTOR PLAN 2
-Patient with history of ESBL E. Coli UTI, most recently in 2/2021. Patient has +UA with KAY, worsening weakness and fatigue and decreased appetite.   - Will c/w Ertapenem for now. Should treat for 7-10 day course for complicated UTI given previous culture hx (3/27-)  - F/u urine cultures, blood cultures. Blood cultures previously in 02/06/21 with evidence of ESBL e coli along with MDRO Klepne which was resistant to ertapenem and treated with Nao/Vaborbactram.  If patient has positive blood cultures will need ID consult for possible broadening of abx.   -UCX 3/26 > 100k ESBL E. coli, f/u sensitivities   - Monitor for fevers, hemodynamic instability  - Can transition to bactrim once medically optimized for discharge.

## 2021-04-01 NOTE — PROGRESS NOTE ADULT - SUBJECTIVE AND OBJECTIVE BOX
PROGRESS NOTE:     Patient is a 81y old  Male who presents with a chief complaint of Weakness, UTI (31 Mar 2021 07:18)      SUBJECTIVE / OVERNIGHT EVENTS:    ADDITIONAL REVIEW OF SYSTEMS:    MEDICATIONS  (STANDING):  ascorbic acid 500 milliGRAM(s) Oral daily  calcium carbonate 1250 mG  + Vitamin D (OsCal 500 + D) 1 Tablet(s) Oral daily  capsaicin HP 0.075% Cream 1 Application(s) Topical <User Schedule>  enoxaparin Injectable 40 milliGRAM(s) SubCutaneous daily  ertapenem  IVPB 1000 milliGRAM(s) IV Intermittent every 24 hours  ferrous    sulfate 325 milliGRAM(s) Oral daily  gabapentin 100 milliGRAM(s) Oral three times a day  influenza  Vaccine (HIGH DOSE) 0.7 milliLiter(s) IntraMuscular once  lactulose Syrup 30 Gram(s) Oral two times a day  lidocaine   Patch 1 Patch Transdermal daily  multivitamin 1 Tablet(s) Oral daily  pantoprazole    Tablet 40 milliGRAM(s) Oral before breakfast  propranolol 10 milliGRAM(s) Oral two times a day    MEDICATIONS  (PRN):  acetaminophen   Tablet .. 650 milliGRAM(s) Oral every 8 hours PRN Mild Pain (1 - 3), Moderate Pain (4 - 6)      CAPILLARY BLOOD GLUCOSE        I&O's Summary      PHYSICAL EXAM:  Vital Signs Last 24 Hrs  T(C): 36.6 (01 Apr 2021 06:03), Max: 36.6 (31 Mar 2021 21:44)  T(F): 97.9 (01 Apr 2021 06:03), Max: 97.9 (31 Mar 2021 21:44)  HR: 80 (01 Apr 2021 06:03) (80 - 85)  BP: 87/62 (01 Apr 2021 06:03) (87/62 - 110/57)  BP(mean): --  RR: 18 (01 Apr 2021 06:03) (18 - 18)  SpO2: 100% (01 Apr 2021 06:03) (99% - 100%)    CONSTITUTIONAL: NAD, well-developed  RESPIRATORY: Normal respiratory effort; lungs are clear to auscultation bilaterally  CARDIOVASCULAR: Regular rate and rhythm, normal S1 and S2, no murmur/rub/gallop; No lower extremity edema; Peripheral pulses are 2+ bilaterally  ABDOMEN: Nontender to palpation, normoactive bowel sounds, no rebound/guarding; No hepatosplenomegaly  MUSCLOSKELETAL: no clubbing or cyanosis of digits; no joint swelling or tenderness to palpation  PSYCH: A+O to person, place, and time; affect appropriate    LABS:                        14.2   4.90  )-----------( 163      ( 31 Mar 2021 08:03 )             44.4     03-31    136  |  101  |  15  ----------------------------<  86  4.4   |  25  |  0.95    Ca    9.5      31 Mar 2021 08:03  Phos  3.1     03-31  Mg     1.6     03-31    TPro  6.9  /  Alb  3.1<L>  /  TBili  0.8  /  DBili  x   /  AST  33  /  ALT  17  /  AlkPhos  232<H>  03-31                RADIOLOGY & ADDITIONAL TESTS:  Results Reviewed:   Imaging Personally Reviewed:  Electrocardiogram Personally Reviewed:    COORDINATION OF CARE:  Care Discussed with Consultants/Other Providers [Y/N]:  Prior or Outpatient Records Reviewed [Y/N]:   PROGRESS NOTE:     Patient is a 81y old  Male who presents with a chief complaint of Weakness, UTI (31 Mar 2021 07:18)    SUBJECTIVE / OVERNIGHT EVENTS: Pt hypotensive this am to SBP in the 80s, s/p 500 cc NS. Pt still with BP in the 90s, given 500 cc 5% albumin with subsequent improvement    ADDITIONAL REVIEW OF SYSTEMS: negative    MEDICATIONS  (STANDING):  ascorbic acid 500 milliGRAM(s) Oral daily  calcium carbonate 1250 mG  + Vitamin D (OsCal 500 + D) 1 Tablet(s) Oral daily  capsaicin HP 0.075% Cream 1 Application(s) Topical <User Schedule>  enoxaparin Injectable 40 milliGRAM(s) SubCutaneous daily  ertapenem  IVPB 1000 milliGRAM(s) IV Intermittent every 24 hours  ferrous    sulfate 325 milliGRAM(s) Oral daily  gabapentin 100 milliGRAM(s) Oral three times a day  influenza  Vaccine (HIGH DOSE) 0.7 milliLiter(s) IntraMuscular once  lactulose Syrup 30 Gram(s) Oral two times a day  lidocaine   Patch 1 Patch Transdermal daily  multivitamin 1 Tablet(s) Oral daily  pantoprazole    Tablet 40 milliGRAM(s) Oral before breakfast  propranolol 10 milliGRAM(s) Oral two times a day    MEDICATIONS  (PRN):  acetaminophen   Tablet .. 650 milliGRAM(s) Oral every 8 hours PRN Mild Pain (1 - 3), Moderate Pain (4 - 6)      CAPILLARY BLOOD GLUCOSE    I&O's Summary    PHYSICAL EXAM:  Vital Signs Last 24 Hrs  T(C): 36.6 (01 Apr 2021 06:03), Max: 36.6 (31 Mar 2021 21:44)  T(F): 97.9 (01 Apr 2021 06:03), Max: 97.9 (31 Mar 2021 21:44)  HR: 80 (01 Apr 2021 06:03) (80 - 85)  BP: 87/62 (01 Apr 2021 06:03) (87/62 - 110/57)  BP(mean): --  RR: 18 (01 Apr 2021 06:03) (18 - 18)  SpO2: 100% (01 Apr 2021 06:03) (99% - 100%)    CONSTITUTIONAL: NAD   RESPIRATORY: Normal respiratory effort; lungs are clear to auscultation bilaterally  CARDIOVASCULAR: Regular rate and rhythm, normal S1 and S2, no murmur/rub/gallop; No lower extremity edema   ABDOMEN: Nontender to palpation, normoactive bowel sounds, no rebound/guarding  MUSCLOSKELETAL: no clubbing or cyanosis of digits  PSYCH: A+O to person, place, and time    LABS:                        14.2   4.90  )-----------( 163      ( 31 Mar 2021 08:03 )             44.4     03-31    136  |  101  |  15  ----------------------------<  86  4.4   |  25  |  0.95    Ca    9.5      31 Mar 2021 08:03  Phos  3.1     03-31  Mg     1.6     03-31    TPro  6.9  /  Alb  3.1<L>  /  TBili  0.8  /  DBili  x   /  AST  33  /  ALT  17  /  AlkPhos  232<H>  03-31                RADIOLOGY & ADDITIONAL TESTS:  Results Reviewed:   Imaging Personally Reviewed:  Electrocardiogram Personally Reviewed:    COORDINATION OF CARE:  Care Discussed with Consultants/Other Providers [Y/N]:  Prior or Outpatient Records Reviewed [Y/N]:

## 2021-04-01 NOTE — PROGRESS NOTE ADULT - ASSESSMENT
Mr. Florez is a 80 yo M with a recent admission for polymicrobial bacteremia 2/2 cholangitis w/gangrenous cholecystitis (2/2021) and a PMH of dementia (AAOx2-3), alcoholic/WALL cirrhosis, HTN, CKD3, RBBB, osteoarthritis, pseudogout, porcelain gallbladder, H. 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.

## 2021-04-01 NOTE — PROGRESS NOTE ADULT - ATTENDING COMMENTS
81M with MDRO UTI. c/w abx, transition to bactrim on discharge. slightly hypotensive this morning, asymptomatic, likely from restarting diuretics. gentle hydration. hold diuretics on discharge, to be restarted at rehab upon further assessment of fluid status. if BP improves, can d/c to rehab, awaiting insurance authorization.

## 2021-04-02 ENCOUNTER — TRANSCRIPTION ENCOUNTER (OUTPATIENT)
Age: 81
End: 2021-04-02

## 2021-04-02 VITALS
TEMPERATURE: 98 F | DIASTOLIC BLOOD PRESSURE: 56 MMHG | OXYGEN SATURATION: 100 % | SYSTOLIC BLOOD PRESSURE: 151 MMHG | RESPIRATION RATE: 18 BRPM | HEART RATE: 70 BPM

## 2021-04-02 PROCEDURE — 99239 HOSP IP/OBS DSCHRG MGMT >30: CPT | Mod: GC

## 2021-04-02 RX ADMIN — PANTOPRAZOLE SODIUM 40 MILLIGRAM(S): 20 TABLET, DELAYED RELEASE ORAL at 06:05

## 2021-04-02 RX ADMIN — Medication 1 TABLET(S): at 12:24

## 2021-04-02 RX ADMIN — Medication 325 MILLIGRAM(S): at 12:24

## 2021-04-02 RX ADMIN — Medication 500 MILLIGRAM(S): at 12:25

## 2021-04-02 RX ADMIN — LIDOCAINE 1 PATCH: 4 CREAM TOPICAL at 12:25

## 2021-04-02 RX ADMIN — Medication 1 APPLICATION(S): at 12:25

## 2021-04-02 RX ADMIN — ENOXAPARIN SODIUM 40 MILLIGRAM(S): 100 INJECTION SUBCUTANEOUS at 12:25

## 2021-04-02 RX ADMIN — LACTULOSE 30 GRAM(S): 10 SOLUTION ORAL at 06:06

## 2021-04-02 RX ADMIN — GABAPENTIN 100 MILLIGRAM(S): 400 CAPSULE ORAL at 06:05

## 2021-04-02 RX ADMIN — GABAPENTIN 100 MILLIGRAM(S): 400 CAPSULE ORAL at 14:20

## 2021-04-02 NOTE — PROGRESS NOTE ADULT - PROBLEM SELECTOR PLAN 2
-Patient with history of ESBL E. Coli UTI, most recently in 2/2021. Patient has +UA with KAY, worsening weakness and fatigue and decreased appetite.   - Will c/w Ertapenem for now. Should treat for 7-10 day course for complicated UTI given previous culture hx (3/27-)  - F/u urine cultures, blood cultures. Blood cultures previously in 02/06/21 with evidence of ESBL e coli along with MDRO Klepne which was resistant to ertapenem and treated with Noa/Vaborbactram.  If patient has positive blood cultures will need ID consult for possible broadening of abx.   -UCX 3/26 > 100k ESBL E. coli, f/u sensitivities   - Monitor for fevers, hemodynamic instability  - Can transition to bactrim once medically optimized for discharge. -Patient with history of ESBL E. Coli UTI, most recently in 2/2021. Patient has +UA with KAY, worsening weakness and fatigue and decreased appetite.   - Will c/w Ertapenem for now. Should treat for 7-10 day course for complicated UTI given previous culture hx (3/27-4/2)  - F/u urine cultures, blood cultures. Blood cultures previously in 02/06/21 with evidence of ESBL e coli along with MDRO Klepne which was resistant to ertapenem and treated with Noa/Vaborbactram.  If patient has positive blood cultures will need ID consult for possible broadening of abx.   -UCX 3/26 > 100k ESBL E. coli, f/u sensitivities   - Monitor for fevers, hemodynamic instability  - Can transition to bactrim once medically optimized for discharge.

## 2021-04-02 NOTE — DISCHARGE NOTE NURSING/CASE MANAGEMENT/SOCIAL WORK - NSDCFUADDAPPT_GEN_ALL_CORE_FT
Please follow up with your hepatologist as an outpatient to let them know about your recent hospitalization. We have held your furosemide on admission please follow up with your hepatologists on whether you need it continued. We held it as you were becoming hypotensive on the medication here in the hospital.     Please follow up with your primary care physician upon discharge from rehab.

## 2021-04-02 NOTE — PROGRESS NOTE ADULT - PROBLEM SELECTOR PLAN 5
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   -  (was >400 during previous admission) with normal AST/ALT, normal TBili. Patient has mild tenderness to palpation in RUQ, will continue to monitor. Consider RUQ u/s if new or worsening symptoms  - c/w home lactulose 30g BID  - c/w home propanolol 10mg BID (for varices) with hold parameters for HR<50 and SBP <100.   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   -  (was >400 during previous admission) with normal AST/ALT, normal TBili. Patient has mild tenderness to palpation in RUQ, will continue to monitor. Consider RUQ u/s if new or worsening symptoms  - c/w home lactulose 30g BID  - c/w home propanolol 10mg BID (for varices) with hold parameters for HR<50 and SBP <100.   - restarted home furosemide 20mg qd on 3/29. D/cong on 4/1 as patient persistent hypotensive with poor PO intake. wife notified that med d/cong and will follow up with PCP and hepatoogist regarding medication change.

## 2021-04-02 NOTE — PROGRESS NOTE ADULT - PROBLEM SELECTOR PLAN 1
Endorsing dizziness and weakness at home with fall. XR hip negative for acute pathology. + orthostatics in the ED and on the floor. Most likely i/s/o poor PO given pre-renal KAY and hx of poor intake at home with diuretic use for ascites.   -IVF resuscitation s/p 1L NS in ED and albumin x1 here on the floor  -If persistent orthostatic despite adequate hydration will need to assess for other etiologies.  -Orthostatics resolved.   - Patient with poor PO intake i/s/o R shoulder and wrist pain. Will give lidocaine patch and hot packs. If doesn't resolve will attempt to try analgesia with capsaicin cream. Endorsing dizziness and weakness at home with fall. XR hip negative for acute pathology. + orthostatics in the ED and on the floor. Most likely i/s/o poor PO given pre-renal KAY and hx of poor intake at home with diuretic use for ascites.   -IVF resuscitation s/p 1L NS in ED and albumin x1 here on the floor  -If persistent orthostatic despite adequate hydration will need to assess for other etiologies.  -Orthostatics resolved.   - Patient with poor PO intake i/s/o R shoulder and wrist pain. Will give lidocaine patch and hot packs. If doesn't resolve will attempt to try analgesia with capsaicin cream.  -Holding home lasix on discharge. wife notified will follow up with hepatologist.

## 2021-04-02 NOTE — PROGRESS NOTE ADULT - PROBLEM SELECTOR PLAN 10
DVT: lovenox.   Diet: Regular, no pork  Per prior admission, patient is FULL CODE.  PT recs [facility]

## 2021-04-02 NOTE — PROGRESS NOTE ADULT - ATTENDING COMMENTS
81M with cirrhosis, CKD3, HTN, OA, gout presented with weakness due to UTI. completed course of IV abx for MDR E.coli. dc to rehab today. noted to beceome hypotensive with diuretics and was orthostatic on presentation. Hold lasix on discharge. to be restarted PRN for signs of fluid overload. stable for d/c to rehab today.    55 minutes spent coordinating discharge

## 2021-04-02 NOTE — DISCHARGE NOTE NURSING/CASE MANAGEMENT/SOCIAL WORK - PATIENT PORTAL LINK FT
You can access the FollowMyHealth Patient Portal offered by [facility] by registering at the following website: [email or web address]. By joining [facility]’s FollowMyHealth portal, you will also be able to view your health information using other applications (apps) compatible with our system.

## 2021-04-02 NOTE — PROGRESS NOTE ADULT - SUBJECTIVE AND OBJECTIVE BOX
Alli Dee M.D.  Internal Medicine PGY-1  [phone] / [id number]     Patient is a 81y old  Male who presents with a chief complaint of Weakness, UTI (01 Apr 2021 07:44)      SUBJECTIVE / OVERNIGHT EVENTS:          MEDICATIONS  (STANDING):  ascorbic acid 500 milliGRAM(s) Oral daily  calcium carbonate 1250 mG  + Vitamin D (OsCal 500 + D) 1 Tablet(s) Oral daily  capsaicin HP 0.075% Cream 1 Application(s) Topical <User Schedule>  enoxaparin Injectable 40 milliGRAM(s) SubCutaneous daily  ferrous    sulfate 325 milliGRAM(s) Oral daily  gabapentin 100 milliGRAM(s) Oral three times a day  influenza  Vaccine (HIGH DOSE) 0.7 milliLiter(s) IntraMuscular once  lactulose Syrup 30 Gram(s) Oral two times a day  lidocaine   Patch 1 Patch Transdermal daily  multivitamin 1 Tablet(s) Oral daily  pantoprazole    Tablet 40 milliGRAM(s) Oral before breakfast  propranolol 10 milliGRAM(s) Oral two times a day    MEDICATIONS  (PRN):  acetaminophen   Tablet .. 650 milliGRAM(s) Oral every 8 hours PRN Mild Pain (1 - 3), Moderate Pain (4 - 6)      Vital Signs Last 24 Hrs  T(C): 36.6 (02 Apr 2021 05:58), Max: 36.8 (01 Apr 2021 21:16)  T(F): 97.9 (02 Apr 2021 05:58), Max: 98.2 (01 Apr 2021 21:16)  HR: 69 (02 Apr 2021 05:58) (66 - 85)  BP: 135/56 (02 Apr 2021 05:58) (92/60 - 148/71)  BP(mean): --  RR: 18 (02 Apr 2021 05:58) (18 - 18)  SpO2: 99% (02 Apr 2021 05:58) (99% - 100%)      PHYSICAL EXAM  GENERAL: NAD, well-developed  HEAD:  Atraumatic, Normocephalic  EYES: EOMI, PERRLA, conjunctiva and sclera clear  NECK: Supple, No JVD  CHEST/LUNG: Clear to auscultation bilaterally; No wheeze  HEART: Regular rate and rhythm; No murmurs, rubs, or gallops  ABDOMEN: Soft, Nontender, Nondistended; Bowel sounds present  EXTREMITIES:  2+ Peripheral Pulses, No clubbing, cyanosis, or edema  PSYCH: AAOx3  SKIN: No rashes or lesions    CAPILLARY BLOOD GLUCOSE        I&O's Summary      LABS:                        13.3   5.59  )-----------( 150      ( 01 Apr 2021 10:02 )             42.6     04-01    135  |  101  |  19  ----------------------------<  118<H>  4.4   |  24  |  1.27    Ca    9.3      01 Apr 2021 10:02  Phos  3.3     04-01  Mg     1.6     04-01    TPro  6.7  /  Alb  3.0<L>  /  TBili  0.8  /  DBili  x   /  AST  31  /  ALT  18  /  AlkPhos  235<H>  04-01              RADIOLOGY & ADDITIONAL TESTS:     MICROBIOLOGY:    ANTIMICROBIALS:    CONSULTS: Alli Dee M.D.  Internal Medicine PGY-1  [phone] / [id number]     Patient is a 81y old  Male who presents with a chief complaint of Weakness, UTI (01 Apr 2021 07:44)      SUBJECTIVE / OVERNIGHT EVENTS:    Patient seen and examined at the bedside this am. Per nursing no acute events overnight. Patient endorsing weakness and pain in his arms and legs. Denies any chest pain, shortness of breath, nausea, vomiting.       MEDICATIONS  (STANDING):  ascorbic acid 500 milliGRAM(s) Oral daily  calcium carbonate 1250 mG  + Vitamin D (OsCal 500 + D) 1 Tablet(s) Oral daily  capsaicin HP 0.075% Cream 1 Application(s) Topical <User Schedule>  enoxaparin Injectable 40 milliGRAM(s) SubCutaneous daily  ferrous    sulfate 325 milliGRAM(s) Oral daily  gabapentin 100 milliGRAM(s) Oral three times a day  influenza  Vaccine (HIGH DOSE) 0.7 milliLiter(s) IntraMuscular once  lactulose Syrup 30 Gram(s) Oral two times a day  lidocaine   Patch 1 Patch Transdermal daily  multivitamin 1 Tablet(s) Oral daily  pantoprazole    Tablet 40 milliGRAM(s) Oral before breakfast  propranolol 10 milliGRAM(s) Oral two times a day    MEDICATIONS  (PRN):  acetaminophen   Tablet .. 650 milliGRAM(s) Oral every 8 hours PRN Mild Pain (1 - 3), Moderate Pain (4 - 6)      Vital Signs Last 24 Hrs  T(C): 36.6 (02 Apr 2021 05:58), Max: 36.8 (01 Apr 2021 21:16)  T(F): 97.9 (02 Apr 2021 05:58), Max: 98.2 (01 Apr 2021 21:16)  HR: 69 (02 Apr 2021 05:58) (66 - 85)  BP: 135/56 (02 Apr 2021 05:58) (92/60 - 148/71)  BP(mean): --  RR: 18 (02 Apr 2021 05:58) (18 - 18)  SpO2: 99% (02 Apr 2021 05:58) (99% - 100%)    CONSTITUTIONAL: NAD mainly bed bound   RESPIRATORY: Normal respiratory effort; lungs are clear to auscultation bilaterally  CARDIOVASCULAR: Regular rate and rhythm, normal S1 and S2, no murmur/rub/gallop; No lower extremity edema   ABDOMEN: Nontender to palpation, normoactive bowel sounds, no rebound/guarding  MUSCLOSKELETAL: no clubbing or cyanosis of digits. Patient with thin digits, endorses bilateral wrist and mcp pain with movement. Senile purpura noted on hands bilaterally.   PSYCH: A+O to person, place, and time      CAPILLARY BLOOD GLUCOSE        I&O's Summary      LABS:                        13.3   5.59  )-----------( 150      ( 01 Apr 2021 10:02 )             42.6     04-01    135  |  101  |  19  ----------------------------<  118<H>  4.4   |  24  |  1.27    Ca    9.3      01 Apr 2021 10:02  Phos  3.3     04-01  Mg     1.6     04-01    TPro  6.7  /  Alb  3.0<L>  /  TBili  0.8  /  DBili  x   /  AST  31  /  ALT  18  /  AlkPhos  235<H>  04-01              RADIOLOGY & ADDITIONAL TESTS:     MICROBIOLOGY:    ANTIMICROBIALS:    CONSULTS:

## 2021-04-02 NOTE — PROGRESS NOTE ADULT - ASSESSMENT
Mr. Florez is a 82 yo M with a recent admission for polymicrobial bacteremia 2/2 cholangitis w/gangrenous cholecystitis (2/2021) and a PMH of dementia (AAOx2-3), alcoholic/WALL cirrhosis, HTN, CKD3, RBBB, osteoarthritis, pseudogout, porcelain gallbladder, H. 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

## 2021-04-19 ENCOUNTER — RESULT REVIEW (OUTPATIENT)
Age: 81
End: 2021-04-19

## 2021-04-21 ENCOUNTER — NON-APPOINTMENT (OUTPATIENT)
Age: 81
End: 2021-04-21

## 2021-04-27 ENCOUNTER — APPOINTMENT (OUTPATIENT)
Dept: HEPATOLOGY | Facility: CLINIC | Age: 81
End: 2021-04-27

## 2021-04-30 DIAGNOSIS — Z01.818 ENCOUNTER FOR OTHER PREPROCEDURAL EXAMINATION: ICD-10-CM

## 2021-05-01 ENCOUNTER — APPOINTMENT (OUTPATIENT)
Dept: DISASTER EMERGENCY | Facility: CLINIC | Age: 81
End: 2021-05-01

## 2021-05-03 ENCOUNTER — APPOINTMENT (OUTPATIENT)
Dept: HEPATOLOGY | Facility: CLINIC | Age: 81
End: 2021-05-03

## 2021-06-09 ENCOUNTER — OUTPATIENT (OUTPATIENT)
Dept: OUTPATIENT SERVICES | Facility: HOSPITAL | Age: 81
LOS: 1 days | End: 2021-06-09

## 2021-06-09 VITALS
DIASTOLIC BLOOD PRESSURE: 70 MMHG | OXYGEN SATURATION: 96 % | SYSTOLIC BLOOD PRESSURE: 120 MMHG | RESPIRATION RATE: 17 BRPM | WEIGHT: 169.98 LBS | HEIGHT: 69 IN | HEART RATE: 86 BPM | TEMPERATURE: 97 F

## 2021-06-09 DIAGNOSIS — Z96.649 PRESENCE OF UNSPECIFIED ARTIFICIAL HIP JOINT: Chronic | ICD-10-CM

## 2021-06-09 DIAGNOSIS — Z98.890 OTHER SPECIFIED POSTPROCEDURAL STATES: Chronic | ICD-10-CM

## 2021-06-09 DIAGNOSIS — Z87.19 PERSONAL HISTORY OF OTHER DISEASES OF THE DIGESTIVE SYSTEM: ICD-10-CM

## 2021-06-09 DIAGNOSIS — K83.09 OTHER CHOLANGITIS: ICD-10-CM

## 2021-06-09 DIAGNOSIS — D64.9 ANEMIA, UNSPECIFIED: ICD-10-CM

## 2021-06-09 LAB
ALBUMIN SERPL ELPH-MCNC: 3.5 G/DL — SIGNIFICANT CHANGE UP (ref 3.3–5)
ALP SERPL-CCNC: 185 U/L — HIGH (ref 40–120)
ALT FLD-CCNC: 15 U/L — SIGNIFICANT CHANGE UP (ref 4–41)
ANION GAP SERPL CALC-SCNC: 11 MMOL/L — SIGNIFICANT CHANGE UP (ref 7–14)
APTT BLD: 35.8 SEC — SIGNIFICANT CHANGE UP (ref 27–36.3)
AST SERPL-CCNC: 27 U/L — SIGNIFICANT CHANGE UP (ref 4–40)
BILIRUB SERPL-MCNC: 0.6 MG/DL — SIGNIFICANT CHANGE UP (ref 0.2–1.2)
BUN SERPL-MCNC: 34 MG/DL — HIGH (ref 7–23)
CALCIUM SERPL-MCNC: 9.7 MG/DL — SIGNIFICANT CHANGE UP (ref 8.4–10.5)
CHLORIDE SERPL-SCNC: 104 MMOL/L — SIGNIFICANT CHANGE UP (ref 98–107)
CO2 SERPL-SCNC: 23 MMOL/L — SIGNIFICANT CHANGE UP (ref 22–31)
CREAT SERPL-MCNC: 1.5 MG/DL — HIGH (ref 0.5–1.3)
GLUCOSE SERPL-MCNC: 94 MG/DL — SIGNIFICANT CHANGE UP (ref 70–99)
INR BLD: 1.46 RATIO — HIGH (ref 0.88–1.16)
POTASSIUM SERPL-MCNC: 5 MMOL/L — SIGNIFICANT CHANGE UP (ref 3.5–5.3)
POTASSIUM SERPL-SCNC: 5 MMOL/L — SIGNIFICANT CHANGE UP (ref 3.5–5.3)
PROT SERPL-MCNC: 7.2 G/DL — SIGNIFICANT CHANGE UP (ref 6–8.3)
PROTHROM AB SERPL-ACNC: 16.3 SEC — HIGH (ref 10.6–13.6)
SODIUM SERPL-SCNC: 138 MMOL/L — SIGNIFICANT CHANGE UP (ref 135–145)

## 2021-06-09 RX ORDER — FERROUS SULFATE 325(65) MG
1 TABLET ORAL
Qty: 0 | Refills: 0 | DISCHARGE

## 2021-06-09 RX ORDER — SODIUM CHLORIDE 9 MG/ML
1000 INJECTION, SOLUTION INTRAVENOUS
Refills: 0 | Status: DISCONTINUED | OUTPATIENT
Start: 2021-06-22 | End: 2021-07-06

## 2021-06-09 NOTE — H&P PST ADULT - NEUROLOGICAL COMMENTS
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 pt. presenting in wheelchair

## 2021-06-09 NOTE — H&P PST ADULT - MUSCULOSKELETAL COMMENTS
"generalized pain", recent right hip replacement 10/2020 History of right hip replacement 10/2020 -unable to ambulate

## 2021-06-09 NOTE — H&P PST ADULT - NSANTHOSAYNRD_GEN_A_CORE
No. SUNNY screening performed.  STOP BANG Legend: 0-2 = LOW Risk; 3-4 = INTERMEDIATE Risk; 5-8 = HIGH Risk

## 2021-06-09 NOTE — H&P PST ADULT - ASSESSMENT
80 yo male with history of alcoholic cirrhosis of liver, pre-op diagnosis of cholangitis scheduled for ERCP anesthesia with Dr. Mckeon.

## 2021-06-09 NOTE — H&P PST ADULT - NSICDXPROBLEM_GEN_ALL_CORE_FT
PROBLEM DIAGNOSES  Problem: H/O cholangitis  Assessment and Plan: scheduled for ERCP anesthesia with Dr. Mckeon on 6/22/2021.  Verbal and written pre-op instructions provided to patient. Patient verbalized understanding and will call surgeons office for revised instructions if surgery is rescheduled.   Pantoprazole for GI prophylaxis.  Covid 19 vaccination card in chart.  Patient will obtain medical clearance as per surgeons request-copy requested for advanced age and deconditioned state.     Problem: Anemia  Assessment and Plan: Pt. instructed to continue medications as prescribed.

## 2021-06-09 NOTE — H&P PST ADULT - NSICDXPASTSURGICALHX_GEN_ALL_CORE_FT
PAST SURGICAL HISTORY:  H/O inguinal hernia repair     History of hip replacement 10/2020    History of repair of hip fracture

## 2021-06-09 NOTE — H&P PST ADULT - NSICDXPASTMEDICALHX_GEN_ALL_CORE_FT
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

## 2021-06-21 RX ORDER — SODIUM CHLORIDE 9 MG/ML
500 INJECTION, SOLUTION INTRAVENOUS
Refills: 0 | Status: DISCONTINUED | OUTPATIENT
Start: 2021-06-22 | End: 2021-07-06

## 2021-06-22 ENCOUNTER — OUTPATIENT (OUTPATIENT)
Dept: OUTPATIENT SERVICES | Facility: HOSPITAL | Age: 81
LOS: 1 days | Discharge: HOME | End: 2021-06-22
Payer: MEDICARE

## 2021-06-22 ENCOUNTER — APPOINTMENT (OUTPATIENT)
Dept: GASTROENTEROLOGY | Facility: HOSPITAL | Age: 81
End: 2021-06-22

## 2021-06-22 VITALS
HEART RATE: 88 BPM | OXYGEN SATURATION: 98 % | SYSTOLIC BLOOD PRESSURE: 117 MMHG | DIASTOLIC BLOOD PRESSURE: 86 MMHG | RESPIRATION RATE: 21 BRPM

## 2021-06-22 VITALS
HEART RATE: 56 BPM | WEIGHT: 169.98 LBS | OXYGEN SATURATION: 99 % | TEMPERATURE: 97 F | HEIGHT: 69 IN | RESPIRATION RATE: 20 BRPM | SYSTOLIC BLOOD PRESSURE: 142 MMHG | DIASTOLIC BLOOD PRESSURE: 59 MMHG

## 2021-06-22 DIAGNOSIS — Z98.890 OTHER SPECIFIED POSTPROCEDURAL STATES: Chronic | ICD-10-CM

## 2021-06-22 DIAGNOSIS — Z96.649 PRESENCE OF UNSPECIFIED ARTIFICIAL HIP JOINT: Chronic | ICD-10-CM

## 2021-06-22 DIAGNOSIS — K83.09 OTHER CHOLANGITIS: ICD-10-CM

## 2021-06-22 PROCEDURE — 74328 X-RAY BILE DUCT ENDOSCOPY: CPT | Mod: 26,GC

## 2021-06-22 PROCEDURE — 43276 ERCP STENT EXCHANGE W/DILATE: CPT | Mod: GC

## 2021-06-22 PROCEDURE — 43273 ENDOSCOPIC PANCREATOSCOPY: CPT | Mod: GC

## 2021-06-22 PROCEDURE — 43265 ERCP LITHOTRIPSY CALCULI: CPT | Mod: GC

## 2021-06-22 RX ADMIN — SODIUM CHLORIDE 30 MILLILITER(S): 9 INJECTION, SOLUTION INTRAVENOUS at 08:06

## 2021-06-22 NOTE — ASU PATIENT PROFILE, ADULT - PSH
H/O inguinal hernia repair    History of hip replacement  10/2020  History of repair of hip fracture

## 2021-06-22 NOTE — ASU PATIENT PROFILE, ADULT - PMH
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

## 2021-06-23 ENCOUNTER — NON-APPOINTMENT (OUTPATIENT)
Age: 81
End: 2021-06-23

## 2021-06-24 NOTE — PHYSICAL THERAPY INITIAL EVALUATION ADULT - PATIENT PROFILE REVIEW, REHAB EVAL
yes 06-24    137  |  103  |  11  ----------------------------<  76  3.7   |  25  |  0.50    Ca    9.3      24 Jun 2021 09:44  Phos  3.6     06-24  Mg     2.0     06-24

## 2021-07-14 ENCOUNTER — NON-APPOINTMENT (OUTPATIENT)
Age: 81
End: 2021-07-14

## 2021-07-19 ENCOUNTER — APPOINTMENT (OUTPATIENT)
Dept: PHYSICAL MEDICINE AND REHAB | Facility: CLINIC | Age: 81
End: 2021-07-19

## 2021-07-21 ENCOUNTER — INPATIENT (INPATIENT)
Facility: HOSPITAL | Age: 81
LOS: 3 days | Discharge: ANOTHER HOSPITAL | DRG: 551 | End: 2021-07-25
Attending: HOSPITALIST | Admitting: HOSPITALIST
Payer: MEDICARE

## 2021-07-21 ENCOUNTER — APPOINTMENT (OUTPATIENT)
Dept: NEUROLOGY | Facility: CLINIC | Age: 81
End: 2021-07-21
Payer: MEDICARE

## 2021-07-21 VITALS
BODY MASS INDEX: 28.32 KG/M2 | HEART RATE: 79 BPM | OXYGEN SATURATION: 96 % | TEMPERATURE: 98.1 F | HEIGHT: 65 IN | DIASTOLIC BLOOD PRESSURE: 64 MMHG | SYSTOLIC BLOOD PRESSURE: 93 MMHG | WEIGHT: 170 LBS

## 2021-07-21 VITALS
HEIGHT: 69 IN | OXYGEN SATURATION: 98 % | WEIGHT: 179.9 LBS | RESPIRATION RATE: 16 BRPM | SYSTOLIC BLOOD PRESSURE: 97 MMHG | TEMPERATURE: 97 F | DIASTOLIC BLOOD PRESSURE: 69 MMHG | HEART RATE: 89 BPM

## 2021-07-21 DIAGNOSIS — Z98.890 OTHER SPECIFIED POSTPROCEDURAL STATES: Chronic | ICD-10-CM

## 2021-07-21 DIAGNOSIS — G31.9 DEGENERATIVE DISEASE OF NERVOUS SYSTEM, UNSPECIFIED: ICD-10-CM

## 2021-07-21 DIAGNOSIS — Z29.9 ENCOUNTER FOR PROPHYLACTIC MEASURES, UNSPECIFIED: ICD-10-CM

## 2021-07-21 DIAGNOSIS — Z96.649 PRESENCE OF UNSPECIFIED ARTIFICIAL HIP JOINT: Chronic | ICD-10-CM

## 2021-07-21 DIAGNOSIS — K74.60 UNSPECIFIED CIRRHOSIS OF LIVER: ICD-10-CM

## 2021-07-21 DIAGNOSIS — I10 ESSENTIAL (PRIMARY) HYPERTENSION: ICD-10-CM

## 2021-07-21 DIAGNOSIS — N17.9 ACUTE KIDNEY FAILURE, UNSPECIFIED: ICD-10-CM

## 2021-07-21 DIAGNOSIS — M62.81 MUSCLE WEAKNESS (GENERALIZED): ICD-10-CM

## 2021-07-21 DIAGNOSIS — R29.898 OTHER SYMPTOMS AND SIGNS INVOLVING THE MUSCULOSKELETAL SYSTEM: ICD-10-CM

## 2021-07-21 LAB
ALBUMIN SERPL ELPH-MCNC: 2.7 G/DL — LOW (ref 3.5–5)
ALP SERPL-CCNC: 175 U/L — HIGH (ref 40–120)
ALT FLD-CCNC: 17 U/L DA — SIGNIFICANT CHANGE UP (ref 10–60)
ANION GAP SERPL CALC-SCNC: 7 MMOL/L — SIGNIFICANT CHANGE UP (ref 5–17)
AST SERPL-CCNC: 46 U/L — HIGH (ref 10–40)
BASOPHILS # BLD AUTO: 0.06 K/UL — SIGNIFICANT CHANGE UP (ref 0–0.2)
BASOPHILS NFR BLD AUTO: 1 % — SIGNIFICANT CHANGE UP (ref 0–2)
BILIRUB SERPL-MCNC: 1 MG/DL — SIGNIFICANT CHANGE UP (ref 0.2–1.2)
BUN SERPL-MCNC: 25 MG/DL — HIGH (ref 7–18)
CALCIUM SERPL-MCNC: 9 MG/DL — SIGNIFICANT CHANGE UP (ref 8.4–10.5)
CHLORIDE SERPL-SCNC: 104 MMOL/L — SIGNIFICANT CHANGE UP (ref 96–108)
CO2 SERPL-SCNC: 27 MMOL/L — SIGNIFICANT CHANGE UP (ref 22–31)
CREAT SERPL-MCNC: 1.52 MG/DL — HIGH (ref 0.5–1.3)
EOSINOPHIL # BLD AUTO: 0.23 K/UL — SIGNIFICANT CHANGE UP (ref 0–0.5)
EOSINOPHIL NFR BLD AUTO: 4 % — SIGNIFICANT CHANGE UP (ref 0–6)
ERYTHROCYTE [SEDIMENTATION RATE] IN BLOOD: 33 MM/HR — HIGH (ref 0–20)
GLUCOSE SERPL-MCNC: 152 MG/DL — HIGH (ref 70–99)
HCT VFR BLD CALC: 45 % — SIGNIFICANT CHANGE UP (ref 39–50)
HGB BLD-MCNC: 14.2 G/DL — SIGNIFICANT CHANGE UP (ref 13–17)
IMM GRANULOCYTES NFR BLD AUTO: 0.5 % — SIGNIFICANT CHANGE UP (ref 0–1.5)
LYMPHOCYTES # BLD AUTO: 1.26 K/UL — SIGNIFICANT CHANGE UP (ref 1–3.3)
LYMPHOCYTES # BLD AUTO: 21.8 % — SIGNIFICANT CHANGE UP (ref 13–44)
MAGNESIUM SERPL-MCNC: 1.8 MG/DL — SIGNIFICANT CHANGE UP (ref 1.6–2.6)
MCHC RBC-ENTMCNC: 25.7 PG — LOW (ref 27–34)
MCHC RBC-ENTMCNC: 31.6 GM/DL — LOW (ref 32–36)
MCV RBC AUTO: 81.5 FL — SIGNIFICANT CHANGE UP (ref 80–100)
MONOCYTES # BLD AUTO: 0.69 K/UL — SIGNIFICANT CHANGE UP (ref 0–0.9)
MONOCYTES NFR BLD AUTO: 11.9 % — SIGNIFICANT CHANGE UP (ref 2–14)
NEUTROPHILS # BLD AUTO: 3.52 K/UL — SIGNIFICANT CHANGE UP (ref 1.8–7.4)
NEUTROPHILS NFR BLD AUTO: 60.8 % — SIGNIFICANT CHANGE UP (ref 43–77)
NRBC # BLD: 0 /100 WBCS — SIGNIFICANT CHANGE UP (ref 0–0)
OSMOLALITY SERPL: 300 MOSMOL/KG — SIGNIFICANT CHANGE UP (ref 280–301)
PLATELET # BLD AUTO: 153 K/UL — SIGNIFICANT CHANGE UP (ref 150–400)
POTASSIUM SERPL-MCNC: 5 MMOL/L — SIGNIFICANT CHANGE UP (ref 3.5–5.3)
POTASSIUM SERPL-SCNC: 5 MMOL/L — SIGNIFICANT CHANGE UP (ref 3.5–5.3)
PROT SERPL-MCNC: 8 G/DL — SIGNIFICANT CHANGE UP (ref 6–8.3)
RBC # BLD: 5.52 M/UL — SIGNIFICANT CHANGE UP (ref 4.2–5.8)
RBC # FLD: 16.2 % — HIGH (ref 10.3–14.5)
SARS-COV-2 RNA SPEC QL NAA+PROBE: SIGNIFICANT CHANGE UP
SODIUM SERPL-SCNC: 138 MMOL/L — SIGNIFICANT CHANGE UP (ref 135–145)
WBC # BLD: 5.79 K/UL — SIGNIFICANT CHANGE UP (ref 3.8–10.5)
WBC # FLD AUTO: 5.79 K/UL — SIGNIFICANT CHANGE UP (ref 3.8–10.5)

## 2021-07-21 PROCEDURE — 99072 ADDL SUPL MATRL&STAF TM PHE: CPT

## 2021-07-21 PROCEDURE — 70450 CT HEAD/BRAIN W/O DYE: CPT | Mod: 26

## 2021-07-21 PROCEDURE — 99285 EMERGENCY DEPT VISIT HI MDM: CPT

## 2021-07-21 PROCEDURE — 99223 1ST HOSP IP/OBS HIGH 75: CPT | Mod: GC

## 2021-07-21 PROCEDURE — 93010 ELECTROCARDIOGRAM REPORT: CPT

## 2021-07-21 PROCEDURE — 72125 CT NECK SPINE W/O DYE: CPT | Mod: 26

## 2021-07-21 PROCEDURE — 99205 OFFICE O/P NEW HI 60 MIN: CPT

## 2021-07-21 RX ORDER — PREGABALIN 225 MG/1
1000 CAPSULE ORAL DAILY
Refills: 0 | Status: DISCONTINUED | OUTPATIENT
Start: 2021-07-21 | End: 2021-07-25

## 2021-07-21 RX ORDER — PANTOPRAZOLE SODIUM 20 MG/1
40 TABLET, DELAYED RELEASE ORAL
Refills: 0 | Status: DISCONTINUED | OUTPATIENT
Start: 2021-07-21 | End: 2021-07-25

## 2021-07-21 RX ORDER — FERROUS SULFATE 325(65) MG
1 TABLET ORAL
Qty: 0 | Refills: 0 | DISCHARGE

## 2021-07-21 RX ORDER — LACTULOSE 10 G/15ML
1 SOLUTION ORAL
Qty: 0 | Refills: 0 | DISCHARGE

## 2021-07-21 RX ORDER — PROPRANOLOL HCL 160 MG
1 CAPSULE, EXTENDED RELEASE 24HR ORAL
Qty: 0 | Refills: 0 | DISCHARGE

## 2021-07-21 RX ORDER — SODIUM CHLORIDE 9 MG/ML
1000 INJECTION INTRAMUSCULAR; INTRAVENOUS; SUBCUTANEOUS
Refills: 0 | Status: DISCONTINUED | OUTPATIENT
Start: 2021-07-21 | End: 2021-07-25

## 2021-07-21 RX ORDER — FOLIC ACID 0.8 MG
5 TABLET ORAL DAILY
Refills: 0 | Status: DISCONTINUED | OUTPATIENT
Start: 2021-07-21 | End: 2021-07-25

## 2021-07-21 RX ORDER — ENOXAPARIN SODIUM 100 MG/ML
40 INJECTION SUBCUTANEOUS DAILY
Refills: 0 | Status: DISCONTINUED | OUTPATIENT
Start: 2021-07-21 | End: 2021-07-25

## 2021-07-21 RX ORDER — THIAMINE MONONITRATE (VIT B1) 100 MG
100 TABLET ORAL DAILY
Refills: 0 | Status: DISCONTINUED | OUTPATIENT
Start: 2021-07-21 | End: 2021-07-25

## 2021-07-21 RX ORDER — FOLIC ACID 0.8 MG
1 TABLET ORAL DAILY
Refills: 0 | Status: DISCONTINUED | OUTPATIENT
Start: 2021-07-21 | End: 2021-07-21

## 2021-07-21 NOTE — DATA REVIEWED
[de-identified] : ortho note appreciated\par CBC and CMP 2021 noted\par Lower extremity duplex 2020 normal

## 2021-07-21 NOTE — H&P ADULT - NSICDXPASTMEDICALHX_GEN_ALL_CORE_FT
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

## 2021-07-21 NOTE — CONSULT LETTER
[Dear  ___] : Dear  [unfilled], [Consult Letter:] : I had the pleasure of evaluating your patient, [unfilled]. [Please see my note below.] : Please see my note below. [Consult Closing:] : Thank you very much for allowing me to participate in the care of this patient.  If you have any questions, please do not hesitate to contact me. [Sincerely,] : Sincerely, [FreeTextEntry3] : Katlyn Cole MD, MPH\par

## 2021-07-21 NOTE — H&P ADULT - NSHPREVIEWOFSYSTEMS_GEN_ALL_CORE
REVIEW OF SYSTEMS:  CONSTITUTIONAL: Weight loss; No fever or fatigue  RESPIRATORY: No cough, wheezing, chills or hemoptysis; No shortness of breath  CARDIOVASCULAR: No chest pain, palpitations, dizziness, or leg swelling  GASTROINTESTINAL: No abdominal pain. No nausea, vomiting, or hematemesis; No diarrhea or constipation. No melena or hematochezia.  GENITOURINARY: No dysuria or hematuria, urinary frequency  NEUROLOGICAL: Loss of strength in LE and hands b/l; No headaches, memory loss, numbness, or tremors  SKIN: No itching, burning, rashes, or lesions

## 2021-07-21 NOTE — HISTORY OF PRESENT ILLNESS
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  He had quadriplegia and was on a respirator for about 6 months at that time, he recovered to 95% of his normal was able to ambulate without any problems.  In fall 2020, the patient underwent right hip surgery followed by revision of the right hip, he was able to ambulate with a walker at that time.  He received COVID-19 vaccination with [employer] vaccine, completed February 2021, and about 1 month later he noticed weakness which is worsening.  He is no longer able to walk, he has severe difficulty standing.  He has difficulty with holding things in his hands.  His symptoms started with tingling sensation in his fingertips and progressed.  The patient is unable to clarify if he has any muscle twitches.  He has noticed difficulty with speaking, no difficulty with swallowing.  The symptoms are progressively worsening since onset, however the patient and the family has noticed some improvement of the symptoms with physical therapy, which was briefly done at an inpatient rehab for about 2 weeks in the spring.\par \par No head trauma.  No family history of ALS.

## 2021-07-21 NOTE — ED ADULT NURSE NOTE - NSIMPLEMENTINTERV_GEN_ALL_ED
Implemented All Fall with Harm Risk Interventions:  [place] to call system. Call bell, personal items and telephone within reach. Instruct patient to call for assistance. Room bathroom lighting operational. Non-slip footwear when patient is off stretcher. Physically safe environment: no spills, clutter or unnecessary equipment. Stretcher in lowest position, wheels locked, appropriate side rails in place. Provide visual cue, wrist band, yellow gown, etc. Monitor gait and stability. Monitor for mental status changes and reorient to person, place, and time. Review medications for side effects contributing to fall risk. Reinforce activity limits and safety measures with patient and family. Provide visual clues: red socks.

## 2021-07-21 NOTE — ASSESSMENT
[FreeTextEntry1] : The patient has history of CIDP with quadriplegia and on a respirator for duration of 6 months with 95% recovery in the 1990s and is here for new progressive weakness in the arms of the legs, as well as slurred speech, progressing since spring 2021, started 1 month after receiving [employer] COVID-19 vaccine.  On neurological exam there is weakness in bilateral deltoids and iliopsoas in which it is 4 out of 5; APB, ADM and FDI as well as EDC 2-3 out of 5 on the right and 4 out of 5 on the left.  Dorsiflexion, EDB/EHL are 4- out of 5 on the right and 4 out of 5 on the lefT, you need to give me a minute is very complicated case please hypothenar more than thenar eminence atrophy as well as sensory loss in bilateral legs to light touch and pinprick, as well as upgoing right toe and slurred speech.  The distribution of the patient's weakness is more concerning for motor neuron disease, such as ALS, moderate neuropathy or CIDP is less likely given the distribution of the symptoms, as well as the presence of sensory symptoms and deep tendon reflexes in both conditions respectively.  We will obtain an MRI of the brain and MRI of the cervical spine to rule out stroke and spinal stenosis which may be contributing to the patient's symptoms.  Will obtain nerve conduction EMG of bilateral arms and legs to evaluate for motor neuron disease as well as other possible diagnoses.  The patient and the family are interested in physical therapy, however they are interested in getting inpatient physical therapy at a rehab facility, referred patient for admission to the hospital for such referral.  They are not interested in outpatient PT at this time.\par \par The patient should follow-up after completing the tests above.\par \par I spent the time noted on the day of this patient encounter preparing for, providing and documenting the above E/M service and counseling and educate patient on differential, workup, disease course, and treatment/management. Education was provided to the patient during this encounter. All questions and concerns were answered and addressed in detail. The patient verbalized understanding and agreed to plan. Patient was advised to continue to monitor for neurologic symptoms and to notify my office or go to the nearest emergency room if there are any changes. Any orders/referrals and communications were provided as well. \par Side effects of the above medications were discussed in detail including but not limited to applicable black box warning and teratogenicity as appropriate. \par Patient was advised to bring previous records to my office, including CD of imaging, when applicable. \par \par

## 2021-07-21 NOTE — ED ADULT TRIAGE NOTE - CHIEF COMPLAINT QUOTE
Send by Neurologist Dr Cole for virgen as unable to walk. Hx renatalain bare after getting flu shot 25 years ago now with similar symptoms after getting covid vaccine.

## 2021-07-21 NOTE — ED PROVIDER NOTE - PMH
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

## 2021-07-21 NOTE — ED PROVIDER NOTE - OBJECTIVE STATEMENT
pt arrived with family from dr. castillo with chief complaint of worsening generalized weakness they were concerned he had guillian barre bc he had a hx of that after a flu shot   he got second covid [email or web address] vaccine in february  pt is having increasingly shuffling gait with weakness in both his upper and lower ext with wt loss, muscle wasting  decreased po intake   dr. hidalgo advised family to come to ED for further evaluation

## 2021-07-21 NOTE — H&P ADULT - PROBLEM SELECTOR PLAN 3
Lovenox on past admission for DVT ppx KAY on CKD?   Cr 1.52, was 1.50 in june  Follow urine lytes  Monitor BMP daily  C/W IV fluids

## 2021-07-21 NOTE — H&P ADULT - NSHPSOCIALHISTORY_GEN_ALL_CORE
Patient no longer drinks alcohol. Patient denies tobacco use. Patient denies alcohol use for past 4 years. Patient denies tobacco use.

## 2021-07-21 NOTE — H&P ADULT - NSHPPHYSICALEXAM_GEN_ALL_CORE
PHYSICAL EXAMINATION:  GENERAL: NAD, AAOx3  HEAD: AT/NC  EYES: conjunctiva and sclera clear  NECK: supple, No JVD noted, Normal thyroid  CHEST/LUNG: CTABL; no rales, rhonchi, wheezing, or rubs  HEART: regular rate and rhythm; no murmurs, rubs, or gallops  ABDOMEN: soft, nontender, nondistended; Bowel sounds present  EXTREMITIES:  2+ Peripheral Pulses, No clubbing, cyanosis, or edema  MUSCULOSKELETAL: 2/5 muscle strength in LE B/L, 3/5 muscle strength in UE B/L, limited ROM of hands b/l  SKIN: warm dry PHYSICAL EXAMINATION:  GENERAL: NAD, AAOx3  HEAD: AT/NC  EYES: conjunctiva and sclera clear  NECK: supple, No JVD noted, Normal thyroid  CHEST/LUNG: CTABL; no rales, rhonchi, wheezing, or rubs  HEART: regular rate and rhythm; no murmurs, rubs, or gallops  ABDOMEN: soft, nontender, nondistended; Bowel sounds present  EXTREMITIES:  2+ Peripheral Pulses, No clubbing, cyanosis, or edema  MUSCULOSKELETAL: 2/5 muscle strength in LE B/L, 3/5 muscle strength in UE B/L, limited ROM of hands b/l, areflexia of left LE and UE, 2+ reflexes of right LE and UE  SKIN: warm dry

## 2021-07-21 NOTE — H&P ADULT - ASSESSMENT
Patient is a 81 year old male with PMHx of Guillain-[place] s/p flu vaccine, HTN, liver cirrhosis and PSHx of right hip replacement presents to the ED with lower extremity weakness. Patient is being admitted for further workup of weakness. Patient is a 81 year old male with PMHx of Guillain-[place] s/p flu vaccine, HTN, liver cirrhosis and PSHx of right hip replacement presents to the ED with lower extremity weakness. Patient is being admitted for further workup of weakness. 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

## 2021-07-21 NOTE — H&P ADULT - PROBLEM SELECTOR PLAN 1
B/L weakness of lower extremity  - B/L weakness of lower extremity  - Possibly 2/2 COVID vaccination  - Neuro consult B/L weakness of lower extremity  - Possibly 2/2 COVID vaccination Guillain-[place] vs Samson Munoz  - Neuro consult B/L weakness of lower extremity  -CT cervical spine: Multilevel degenerative changes. No acute fx  -CT brain: mild to moderate cerebral volume loss and involutional change. No CT evidence of an acute infarct, hemorrhage, or mass lesion.  - Possibly 2/2 COVID vaccination Guillain-[place] vs Samson Munoz  - Neuro consult B/L weakness of lower extremity  -CT cervical spine: Multilevel degenerative changes. No acute fx  -CT brain: mild to moderate cerebral volume loss and involutional change. No CT evidence of an acute infarct, hemorrhage, or mass lesion.  - Possibly 2/2 COVID vaccination Guillain-[place] vs Samson Munoz  - Neuro consult  - MRI B/L weakness of lower extremity, Possibly 2/2 COVID vaccination Guillain-[place] vs CIDP  NIH -40  -CT cervical spine: Multilevel degenerative changes. No acute fx  -CT brain: mild to moderate cerebral volume loss and involutional change. No CT evidence of an acute infarct, hemorrhage, or mass lesion.  Started on thiamine, folic acid and vitamin B12  Follow Vit B1 levels in the am  Follow MRI brain and C,T spine  Follow NIH and vital capacity Q6  Dr. Gallegos consulted B/L weakness of lower extremity, Possibly 2/2 COVID vaccination Guillain-[place] vs CIDP  NIH -40  -CT cervical spine: Multilevel degenerative changes. No acute fx  -CT brain: mild to moderate cerebral volume loss and involutional change. No CT evidence of an acute infarct, hemorrhage, or mass lesion.  Started on thiamine, folic acid and vitamin B12  Follow Vit B1 levels in the am  Follow MRI brain and C,T spine  Follow NIH and vital capacity Q6  Dr. Gallegos consulted  - possible LP  - Recommended 100 mg thymine, 5 mg Folic Acid, 1000 Micrograms of B12

## 2021-07-21 NOTE — H&P ADULT - PROBLEM SELECTOR PLAN 2
- Current /73  - Med recs for home medications - Current /73  - takes propanolol and lasix at home  Hold for now  Resume as indicated

## 2021-07-21 NOTE — ED ADULT NURSE NOTE - PMH
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

## 2021-07-21 NOTE — H&P ADULT - NSHPLABSRESULTS_GEN_ALL_CORE
14.2   5.79  )-----------( 153      ( 21 Jul 2021 15:33 )             45.0     07-21    138  |  104  |  25<H>  ----------------------------<  152<H>  5.0   |  27  |  1.52<H>    Ca    9.0      21 Jul 2021 15:33  Mg     1.8     07-21    TPro  8.0  /  Alb  2.7<L>  /  TBili  1.0  /  DBili  x   /  AST  46<H>  /  ALT  17  /  AlkPhos  175<H>  07-21    LIVER FUNCTIONS - ( 21 Jul 2021 15:33 )  Alb: 2.7 g/dL / Pro: 8.0 g/dL / ALK PHOS: 175 U/L / ALT: 17 U/L DA / AST: 46 U/L / GGT: x 14.2   5.79  )-----------( 153      ( 21 Jul 2021 15:33 )             45.0     07-21    138  |  104  |  25<H>  ----------------------------<  152<H>  5.0   |  27  |  1.52<H>    Ca    9.0      21 Jul 2021 15:33  Mg     1.8     07-21    TPro  8.0  /  Alb  2.7<L>  /  TBili  1.0  /  DBili  x   /  AST  46<H>  /  ALT  17  /  AlkPhos  175<H>  07-21    LIVER FUNCTIONS - ( 21 Jul 2021 15:33 )  Alb: 2.7 g/dL / Pro: 8.0 g/dL / ALK PHOS: 175 U/L / ALT: 17 U/L DA / AST: 46 U/L / GGT: x    < from: CT Cervical Spine No Cont (07.21.21 @ 15:14) >      EXAM:  CT CERVICAL SPINE                            PROCEDURE DATE:  07/21/2021          INTERPRETATION:  CT CERVICAL    INDICATIONS:  neck pain. Trauma.    TECHNIQUE:  Thin section CT imaging was conducted.  3-D, Coronal and sagittal reformations were generated from the axial data.    FINDINGS:    There is alteration of the cervical lordosis which may reflect positioning or spasm.    C1/C2:  The anterior and posterior arches of C1 appear to be intact. There is no C1-C2 subluxation. No odontoidfracture is noted. Base of C2 appears to be intact    The mid and lower cervical vertebral bodies show no evidence of an acute fracture. There is mild chronic-appearing compression of C5 and C6.    Underlying chronic degenerative changes are noted from C3 to C7 with spondylotic and arthritic changes. There is central and foraminal narrowing.    Lung apices are clear. No pneumothorax noted.    IMPRESSION:    Multilevel degenerative changes. No acute fracture identified.      EXAM:  CT BRAIN                            PROCEDURE DATE:  07/21/2021          INTERPRETATION:  INDICATION:  Status post trauma with head injury.   Headache.  TECHNIQUE:  A non contrast 2.5mm axial CT study of the brain was performed from skull base to vertex. Coronal and sagittal reformations were generated from the axial data.  COMPARISON EXAMINATION:  no prior.    FINDINGS:    HEMISPHERES:There is generalized mild to moderate cerebral volume loss and atrophic change. No acute territorial infarct or hemorrhagic focus is noted. No mass lesion is identified.  VENTRICLES:  Midline with ex vacuo enlargement  POSTERIOR FOSSA:  The brain stem and cerebellum are unremarkable.  No CP angle lesion noted.  EXTRACEREBRAL SPACES:  No subdural or epidural collections are noted.  SKULL BASE AND CALVARIUM:  Appears intact.  No fracture or destructive lesion is identified.  SINUSES AND MASTOIDS:  Inflammatory changes are noted in the right maxillary sinus with near-complete opacification.  MISCELLANEOUS:  No orbital or suprasellar abnormality noted.    IMPRESSION:    1)  mild to moderate cerebral volume loss and involutional change. No CT evidence of an acute infarct, hemorrhage, or mass lesion..  2)  follow-up MR imaging may be considered for further assessment, if clinically warranted.

## 2021-07-21 NOTE — H&P ADULT - ATTENDING COMMENTS
Patient seen/evaluated at bedside in the ED on 7/21/2021. I agree with the resident progress note/outlined plan of care. My independent findings and conclusions are documented.    Vital Signs Last 24 Hrs  T(C): 36.8 (21 Jul 2021 19:54), Max: 36.8 (21 Jul 2021 19:54)  T(F): 98.3 (21 Jul 2021 19:54), Max: 98.3 (21 Jul 2021 19:54)  HR: 52 (21 Jul 2021 19:54) (52 - 89)  BP: 122/79 (21 Jul 2021 19:54) (97/69 - 122/79)  RR: 16 (21 Jul 2021 19:54) (16 - 16)  SpO2: 97% (21 Jul 2021 19:54) (97% - 99%)    1. progressive quadriparesis  2. Patient seen/evaluated at bedside in the ED on 7/21/2021. I agree with the resident H&P note/outlined plan of care. My independent findings and conclusions are documented.    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 patient somewhat forgetful historian  no reported nausea/vomiting/diarrhea.    Vital Signs Last 24 Hrs  T(C): 36.8 (21 Jul 2021 19:54), Max: 36.8 (21 Jul 2021 19:54)  T(F): 98.3 (21 Jul 2021 19:54), Max: 98.3 (21 Jul 2021 19:54)  HR: 52 (21 Jul 2021 19:54) (52 - 89)  BP: 122/79 (21 Jul 2021 19:54) (97/69 - 122/79)  RR: 16 (21 Jul 2021 19:54) (16 - 16)  SpO2: 97% (21 Jul 2021 19:54) (97% - 99%)    1. progressive quadriparesis  2. 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 liver cirrhosis  4. hypoalbuminemia  5. likely CKD stage 3  5. severe protein calorie malnutrition  6. CKD stage 3    CT head and cervical spine reviewed  differential many: include subacute combined degeneration, CIDP/demyelinating syndrome, less likely guillain barre in light of lack of acuity  case appreciatively discussed with neurologist Dr. Gallegos--> rec MRI C and T spine. Serologic testing for deficiencies and syndromes leading to neuropathy including HIV, RPR, HgbA1c. copper/b12/b6/b1/MMA, homocysteine... He also recommended empiric treatment with b12 1000mcg, folate 5mg, thiamine while awaiting lab results  -please call and consult respiratory therapy for NIF and vital capacity testing  -fall precautions, physical therapy consult  -resume lasix and propranolol which he takes for cirrhosis  -nutrition consult

## 2021-07-21 NOTE — ED PROVIDER NOTE - NEUROLOGICAL LEFT FINGER NOSE FINGER
Problem: Patient Care Overview  Goal: Plan of Care Review  Outcome: Ongoing (interventions implemented as appropriate)   09/09/19 2040 09/10/19 0330 09/10/19 0505   Plan of Care Review   Progress improving --  --    OTHER   Outcome Summary --  --  Pt did refuse her accucheck and vitals this shift but was compliant with her meds. Insulin and hydralazine held due to lack of blood glucose level and vitals. Pt was cooperative with hands on care but was getting frustrated with the questions that were asked. Pt slept most of the shift. Pt denies pain/SI/HI. 9/10/19 0509     Coping/Psychosocial   Plan of Care Reviewed With --  patient --    Coping/Psychosocial   Patient Agreement with Plan of Care --  agrees --        Problem: Overarching Goals (Adult)  Goal: Adheres to Safety Considerations for Self and Others  Outcome: Ongoing (interventions implemented as appropriate)   09/10/19 0505   Overarching Goals (Adult)   Adheres to Safety Considerations for Self and Others making progress toward outcome     Goal: Optimized Coping Skills in Response to Life Stressors  Outcome: Ongoing (interventions implemented as appropriate)   09/10/19 0505   Overarching Goals (Adult)   Optimized Coping Skills in Response to Life Stressors making progress toward outcome     Goal: Develops/Participates in Therapeutic [place] to Support Successful Transition  Outcome: Ongoing (interventions implemented as appropriate)   09/10/19 0505   Overarching Goals (Adult)   Develops/Participates in Therapeutic [place] to Support Successful Transition making progress toward outcome          HYPOMETRIA

## 2021-07-21 NOTE — H&P ADULT - PROBLEM SELECTOR PLAN 5
IMPROVE VTE Individual Risk Assessment  RISK                                                                Points  [  ] Previous VTE                                                  3  [  ] Thrombophilia                                               2  [  ] Lower limb paralysis                                      2        (unable to hold up >15 seconds)    [  ] Current Cancer                                              2         (within 6 months)  [x  ] Immobilization > 24 hrs                                1  [  ] ICU/CCU stay > 24 hours                              1  [x  ] Age > 60                                                      1  IMPROVE VTE Score _________2, -- for DVT proph    Lovenox on past admission for DVT ppx

## 2021-07-21 NOTE — H&P ADULT - HISTORY OF PRESENT ILLNESS
Pt is a 82 yo male who presents to the ED with generalized lower extremity weakness. Patient had right hip surgery in October 2020 at which point he started using a walker. Towards the end of February, patient began having feelings of numbness in his toes which has been worsening. Patient has been immobile for the past 6 weeks. Patient describes his legs as "paralyzed" Patient was able to see his neurologist today, Dr. Cole who recommended that he come to the ED for further evaluation. Patient was seen by physical therapy but continued to have difficulty moving. Patient had COVID Pfizer vaccine towards the end of February 2021. Patient also states that he is unable to feed himself due to stiffness in his hands. 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 Patient has no fever, chills, SOB, dysphagia.      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 After the episode pt was able to ambulate using a cane and eventually regained normal strength. In october pt started experiencing tingling in the toes which eventually caused him to fall leading to Right sided hip fracture which was surgically repaired. Pt continued rehab after surgery and was able to ambulate using walker. In february 2021 pt received the 2nd dose of Pfizer vaccine and the tingling sensation in his toes started to get worse.  For the last 6 weeks pt has been bed bound as he has little to no strength in his lower extremities. Pt went to the clinic of Dr. Clarke where he was examined and told to come to the ED. He was brought in by his daughter and wife who were contacted to get most of the history as pt wasn't too clear on the timeline.        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 After the episode pt was able to ambulate using a cane and eventually regained normal strength. In october pt started experiencing tingling in the toes which eventually caused him to fall leading to Right sided hip fracture which was surgically repaired. Pt continued rehab after surgery and was able to ambulate using walker. In february 2021 pt received the 2nd dose of Pfizer vaccine and the tingling sensation in his toes started to get worse.  For the last 6 weeks pt has been bed bound as he has little to no strength in his lower extremities. Pt went to the clinic of Dr. Clarke where he was examined and told to come to the ED. He was brought in by his daughter and wife who were contacted to get most of the history as pt wasn't too clear on the timeline.        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 After the episode pt was able to ambulate using a cane and eventually regained normal strength. In october pt started experiencing tingling in the toes which eventually caused him to fall leading to Right sided hip fracture which was surgically repaired. Pt continued rehab after surgery and was able to ambulate using walker. In february 2021 pt received the 2nd dose of Pfizer vaccine and the tingling sensation in his toes started to get worse.  For the last 6 weeks pt has been bed bound as he has little to no strength in his lower extremities. Pt went to the clinic of Dr. Clarke where he was examined and told to come to the ED. He was brought in by his daughter and wife who were contacted to get most of the history as pt wasn't too clear on the timeline.

## 2021-07-21 NOTE — PHYSICAL EXAM
[General Appearance - Alert] : alert [General Appearance - In No Acute Distress] : in no acute distress [Person] : oriented to person [Place] : oriented to place [Time] : oriented to time [Registration Intact] : recent registration memory intact [Concentration Intact] : normal concentrating ability [Naming Objects] : no difficulty naming common objects [Fluency] : fluency not intact [Comprehension] : comprehension intact [Vocabulary] : adequate range of vocabulary [Cranial Nerves Optic (II)] : visual acuity intact bilaterally,  visual fields full to confrontation, pupils equal round and reactive to light [Cranial Nerves Oculomotor (III)] : extraocular motion intact [Cranial Nerves Trigeminal (V)] : facial sensation intact symmetrically [Cranial Nerves Facial (VII)] : face symmetrical [Cranial Nerves Vestibulocochlear (VIII)] : hearing was intact bilaterally [Cranial Nerves Glossopharyngeal (IX)] : tongue and palate midline [Cranial Nerves Accessory (XI - Cranial And Spinal)] : head turning and shoulder shrug symmetric [Cranial Nerves Hypoglossal (XII)] : there was no tongue deviation with protrusion [Motor Tone] : muscle tone was normal in all four extremities [1+] : Ankle jerk left 1+ [Plantar Reflex Right Only] : abnormal on the right [Plantar Reflex Left Only] : normal on the left [FreeTextEntry5] : Neck flexion and neck extension 5 out of 5.  There is no fasciculations in the tongue, no atrophy.  Tongue strength is 4 out of 4 bilaterally. [FreeTextEntry6] : Strength is full except in bilateral deltoids and iliopsoas in which it is 4 out of 5; APB, ADM and FDI as well as EDC 2-3 out of 5 on the right and 4 out of 5 on the left.  Dorsiflexion, EDB/EHL are 4- out of 5 on the right and 4 out of 5 on the left.\par There is hypothenar more than thenar eminence atrophy [FreeTextEntry7] : For stocking glove sensory loss to light touch and pinprick in bilateral legs [FreeTextEntry8] : Patient is having difficulty standing up even with assistance

## 2021-07-22 LAB
A1C WITH ESTIMATED AVERAGE GLUCOSE RESULT: 5.3 % — SIGNIFICANT CHANGE UP (ref 4–5.6)
ALBUMIN SERPL ELPH-MCNC: 2.5 G/DL — LOW (ref 3.5–5)
ALP SERPL-CCNC: 150 U/L — HIGH (ref 40–120)
ALT FLD-CCNC: 12 U/L DA — SIGNIFICANT CHANGE UP (ref 10–60)
ANION GAP SERPL CALC-SCNC: 9 MMOL/L — SIGNIFICANT CHANGE UP (ref 5–17)
AST SERPL-CCNC: 22 U/L — SIGNIFICANT CHANGE UP (ref 10–40)
BILIRUB SERPL-MCNC: 0.9 MG/DL — SIGNIFICANT CHANGE UP (ref 0.2–1.2)
BUN SERPL-MCNC: 26 MG/DL — HIGH (ref 7–18)
CALCIUM SERPL-MCNC: 8.9 MG/DL — SIGNIFICANT CHANGE UP (ref 8.4–10.5)
CHLORIDE SERPL-SCNC: 108 MMOL/L — SIGNIFICANT CHANGE UP (ref 96–108)
CO2 SERPL-SCNC: 25 MMOL/L — SIGNIFICANT CHANGE UP (ref 22–31)
COVID-19 SPIKE DOMAIN AB INTERP: NEGATIVE — SIGNIFICANT CHANGE UP
COVID-19 SPIKE DOMAIN ANTIBODY RESULT: 0.4 U/ML — SIGNIFICANT CHANGE UP
CREAT SERPL-MCNC: 1.15 MG/DL — SIGNIFICANT CHANGE UP (ref 0.5–1.3)
CRP SERPL-MCNC: 23 MG/L — HIGH
ESTIMATED AVERAGE GLUCOSE: 105 MG/DL — SIGNIFICANT CHANGE UP (ref 68–114)
FOLATE SERPL-MCNC: 11.5 NG/ML — SIGNIFICANT CHANGE UP
GLUCOSE SERPL-MCNC: 100 MG/DL — HIGH (ref 70–99)
HAV IGM SER-ACNC: SIGNIFICANT CHANGE UP
HBV CORE IGM SER-ACNC: SIGNIFICANT CHANGE UP
HBV SURFACE AG SER-ACNC: SIGNIFICANT CHANGE UP
HCT VFR BLD CALC: 41.2 % — SIGNIFICANT CHANGE UP (ref 39–50)
HCV AB S/CO SERPL IA: 0.09 S/CO — SIGNIFICANT CHANGE UP (ref 0–0.99)
HCV AB SERPL-IMP: SIGNIFICANT CHANGE UP
HGB BLD-MCNC: 13 G/DL — SIGNIFICANT CHANGE UP (ref 13–17)
HIV 1+2 AB+HIV1 P24 AG SERPL QL IA: SIGNIFICANT CHANGE UP
MAGNESIUM SERPL-MCNC: 1.9 MG/DL — SIGNIFICANT CHANGE UP (ref 1.6–2.6)
MCHC RBC-ENTMCNC: 25.8 PG — LOW (ref 27–34)
MCHC RBC-ENTMCNC: 31.6 GM/DL — LOW (ref 32–36)
MCV RBC AUTO: 81.7 FL — SIGNIFICANT CHANGE UP (ref 80–100)
NRBC # BLD: 0 /100 WBCS — SIGNIFICANT CHANGE UP (ref 0–0)
PHOSPHATE SERPL-MCNC: 2.6 MG/DL — SIGNIFICANT CHANGE UP (ref 2.5–4.5)
PLATELET # BLD AUTO: 124 K/UL — LOW (ref 150–400)
POTASSIUM SERPL-MCNC: 3.9 MMOL/L — SIGNIFICANT CHANGE UP (ref 3.5–5.3)
POTASSIUM SERPL-SCNC: 3.9 MMOL/L — SIGNIFICANT CHANGE UP (ref 3.5–5.3)
PROT SERPL-MCNC: 7 G/DL — SIGNIFICANT CHANGE UP (ref 6–8.3)
RBC # BLD: 5.04 M/UL — SIGNIFICANT CHANGE UP (ref 4.2–5.8)
RBC # FLD: 16.6 % — HIGH (ref 10.3–14.5)
SARS-COV-2 IGG+IGM SERPL QL IA: 0.4 U/ML — SIGNIFICANT CHANGE UP
SARS-COV-2 IGG+IGM SERPL QL IA: NEGATIVE — SIGNIFICANT CHANGE UP
SODIUM SERPL-SCNC: 142 MMOL/L — SIGNIFICANT CHANGE UP (ref 135–145)
T PALLIDUM AB TITR SER: NEGATIVE — SIGNIFICANT CHANGE UP
VIT B12 SERPL-MCNC: 878 PG/ML — SIGNIFICANT CHANGE UP (ref 232–1245)
WBC # BLD: 5.38 K/UL — SIGNIFICANT CHANGE UP (ref 3.8–10.5)
WBC # FLD AUTO: 5.38 K/UL — SIGNIFICANT CHANGE UP (ref 3.8–10.5)

## 2021-07-22 PROCEDURE — 99232 SBSQ HOSP IP/OBS MODERATE 35: CPT

## 2021-07-22 PROCEDURE — 70551 MRI BRAIN STEM W/O DYE: CPT | Mod: 26

## 2021-07-22 PROCEDURE — 72146 MRI CHEST SPINE W/O DYE: CPT | Mod: 26

## 2021-07-22 PROCEDURE — 99233 SBSQ HOSP IP/OBS HIGH 50: CPT

## 2021-07-22 PROCEDURE — 72141 MRI NECK SPINE W/O DYE: CPT | Mod: 26

## 2021-07-22 RX ORDER — FUROSEMIDE 40 MG
20 TABLET ORAL DAILY
Refills: 0 | Status: DISCONTINUED | OUTPATIENT
Start: 2021-07-22 | End: 2021-07-25

## 2021-07-22 RX ORDER — ACETAMINOPHEN 500 MG
1000 TABLET ORAL ONCE
Refills: 0 | Status: COMPLETED | OUTPATIENT
Start: 2021-07-22 | End: 2021-07-22

## 2021-07-22 RX ADMIN — PREGABALIN 1000 MICROGRAM(S): 225 CAPSULE ORAL at 13:51

## 2021-07-22 RX ADMIN — Medication 5 MILLIGRAM(S): at 13:51

## 2021-07-22 RX ADMIN — Medication 20 MILLIGRAM(S): at 17:43

## 2021-07-22 RX ADMIN — PANTOPRAZOLE SODIUM 40 MILLIGRAM(S): 20 TABLET, DELAYED RELEASE ORAL at 06:18

## 2021-07-22 RX ADMIN — ENOXAPARIN SODIUM 40 MILLIGRAM(S): 100 INJECTION SUBCUTANEOUS at 13:51

## 2021-07-22 RX ADMIN — Medication 400 MILLIGRAM(S): at 23:10

## 2021-07-22 RX ADMIN — Medication 1000 MILLIGRAM(S): at 23:35

## 2021-07-22 RX ADMIN — Medication 100 MILLIGRAM(S): at 13:51

## 2021-07-22 NOTE — CONSULT NOTE ADULT - SUBJECTIVE AND OBJECTIVE BOX
To be completed.   To be completed.            EXAMINATION    Awake, alert.  Supine in bed.  Fluent accented English.   EOMI; confrontation visual fields grossly intact.  Normal facial/lingual movements.  No tongue atrophy.      Quadriparetic with asymmetries.        Reflex                           Right    Left   Comment    Jaw jerk                             absent  Scapulohumeral               0        0  Pectoralis                         0       2  Biceps                             3        2  low amplitude  Triceps                            3-      3- very low amplitudes  Brachiorad                       0       0  Fing flex                           1       0  Palmer                     absent  absent  Hip add                           tr       0  Patellar                            2-      0  Gastroc                            0      0  Plantar                      extensor flexor      No spontaneous fasciculations observed of tongue, or limbs.  No percussion-induced fasciculations of myotonia of limbs.   Patient with slowly progressively worsening weakness predominantly of the lower extremities, first evidnt to him around October 2020, and getting to the point of being bedbound.      NOTE: The history of CIDP and the history of getting COVID vaccination reported in the HPI quoted below are red herrings; they have nothing to do with why he is quadriparetic now.        <Start of quote from Admission H&P>  ""  <End of quote from Admission H&P>      EXAMINATION    Awake, alert.  Supine in bed.  Fluent accented English.   EOMI; confrontation visual fields grossly intact.  Normal facial/lingual movements.  No tongue atrophy.      Quadriparetic with asymmetries.        Reflex                           Right    Left   Comment    Jaw jerk                             absent  Scapulohumeral               0        0  Pectoralis                         0       2  Biceps                             3        2  low amplitude  Triceps                            3-      3- very low amplitudes  Brachiorad                       0       0  Fing flex                           1       0  Palmer                     absent  absent  Hip add                           tr       0  Patellar                            2-      0  Gastroc                            0      0  Plantar                      extensor flexor      No spontaneous fasciculations observed of tongue, or limbs.  No percussion-induced fasciculations of myotonia of limbs.   Patient with slowly progressively worsening weakness predominantly of the lower extremities, first evidnt to him around October 2020, and getting to the point of being bedbound.      NOTE: The history of CIDP/GBS and the history of getting COVID vaccination reported in the HPI quoted below are red herrings; they have nothing to do with why he is quadriparetic now.        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 After the episode pt was able to ambulate using a cane and eventually regained normal strength. In october pt started experiencing tingling in the toes which eventually caused him to fall leading to Right sided hip fracture which was surgically repaired. Pt continued rehab after surgery and was able to ambulate using walker. In february 2021 pt received the 2nd dose of Pfizer vaccine and the tingling sensation in his toes started to get worse.  For the last 6 weeks pt has been bed bound as he has little to no strength in his lower extremities. Pt went to the clinic of Dr. Clarke where he was examined and told to come to the ED. He was brought in by his daughter and wife who were contacted to get most of the history as pt wasn't too clear on the timeline.      Review of Systems:  Review of Systems: REVIEW OF SYSTEMS:  CONSTITUTIONAL: Weight loss; No fever or fatigue  RESPIRATORY: No cough, wheezing, chills or hemoptysis; No shortness of breath  CARDIOVASCULAR: No chest pain, palpitations, dizziness, or leg swelling  GASTROINTESTINAL: No abdominal pain. No nausea, vomiting, or hematemesis; No diarrhea or constipation. No melena or hematochezia.  GENITOURINARY: No dysuria or hematuria, urinary frequency  NEUROLOGICAL: Loss of strength in LE and hands b/l; No headaches, memory loss, numbness, or tremors  SKIN: No itching, burning, rashes, or lesions    Allergies and Intolerances:        Allergies:  	No Known Allergies:     Home Medications:   * Patient Currently Takes Medications as of 09-Jun-2021 13:57 documented in Structured Notes  · 	pantoprazole 40 mg oral delayed release tablet: 1 tab(s) orally once a day, am  · 	lactulose 20 g oral powder for reconstitution: 1 each orally once a day,   · 	ferrous sulfate 324 mg (65 mg elemental iron) oral delayed release tablet: 1 tab(s) orally once a day  · 	propranolol 10 mg oral tablet: 1 tab(s) orally 2 times a day  · 	diclofenac potassium 50 mg oral tablet: 1 tab(s) orally 3 times a day  · 	Multiple Vitamins oral capsule: 1 cap(s) orally once a day, last dose 6/15/21  · 	Calcium 600+D oral tablet: 1 tab(s) orally once a day    Patient History:    Past Medical, Past Surgical, and Family History:  PAST MEDICAL HISTORY:  Guillain-[place] syndrome 1996 after flu vaccine    Hematochezia 2/2 colonic AVMs s/p cauterization and hemorrhoids (11/2019)    HTN (hypertension)     Liver cirrhosis alcoholic/WALL cirrhosis c/b variceal bleed s/p banding (8/2018) and hepatic encephalopathy (several years ago)    Melena 2/2 duodenal ulcers s/p argon plasma coagulation (4/2020)    Pancreatitis 2/2 choledocholithiasis s/p ERCP with stone extraction and plastic stent placement (11/2019, stent now removed)    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     PAST SURGICAL HISTORY:  H/O inguinal hernia repair     History of hip replacement 10/2020    History of repair of hip fracture."  <End of quote from Admission H&P>      EXAMINATION    Awake, alert.  Supine in bed.  Fluent accented English.   EOMI; confrontation visual fields grossly intact.  Normal facial/lingual movements.  No tongue atrophy.      Quadriparetic with asymmetries.        Reflex                           Right    Left   Comment    Jaw jerk                             absent  Scapulohumeral               0        0  Pectoralis                         0       2  Biceps                             3        2  low amplitude  Triceps                            3-      3- very low amplitudes  Brachiorad                       0       0  Fing flex                           1       0  Palmer                     absent  absent  Hip add                           tr       0  Patellar                            2-      0  Gastroc                            0      0  Plantar                      extensor flexor      No spontaneous fasciculations observed of tongue, or limbs.  No percussion-induced fasciculations of myotonia of limbs.   Patient with slowly progressively worsening weakness predominantly of the lower extremities.  He says that it first became evident to him in October 2020 after R hip replacement surgery, getting to the point of being bedbound.  He reports that since hip replacement surgery he has been unable to bear weight on the RLE, and barely on the LLE.     On review of the EMR I note the following relevent information:    From the  H&P of 10/21/20:  "80y Male Hx Cirrhosis, HTN s/p R hip IMN for basicervical hip fracture in August 19 2020 presents now with failure of hardware."    On 10/22/20 he underwent removal of the intramedullary nail, and total hip arthroplasty.      From the H&P of 8/19/20, when he sustained the hip fracture, I note:  "79 yo male s/p mechanical fall on way to bathroom.  Pt is community walker with walker/cane, did not use on way to bathroom this am, tripped and fell.  Denies head trauma , syncope. Pt states unable to get up secondary to right hip pain.  Pt brought to [facility] , XR reveal right basicervical FN fracture"      IN RETROSPECT, THIS PATIENTS HAS BEEN SYMPTOMATICALLY PARAPARETIC FOR A LONG TIME.      NOTE: The history of CIDP/GBS and the history of getting COVID vaccination reported in the HPI quoted below are red herrings; they do not account for why he is quadriparetic now.        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 After the episode pt was able to ambulate using a cane and eventually regained normal strength. In october pt started experiencing tingling in the toes which eventually caused him to fall leading to Right sided hip fracture which was surgically repaired. Pt continued rehab after surgery and was able to ambulate using walker. In february 2021 pt received the 2nd dose of Pfizer vaccine and the tingling sensation in his toes started to get worse.  For the last 6 weeks pt has been bed bound as he has little to no strength in his lower extremities. Pt went to the clinic of Dr. Clarke where he was examined and told to come to the ED. He was brought in by his daughter and wife who were contacted to get most of the history as pt wasn't too clear on the timeline.      Review of Systems:  Review of Systems: REVIEW OF SYSTEMS:  CONSTITUTIONAL: Weight loss; No fever or fatigue  RESPIRATORY: No cough, wheezing, chills or hemoptysis; No shortness of breath  CARDIOVASCULAR: No chest pain, palpitations, dizziness, or leg swelling  GASTROINTESTINAL: No abdominal pain. No nausea, vomiting, or hematemesis; No diarrhea or constipation. No melena or hematochezia.  GENITOURINARY: No dysuria or hematuria, urinary frequency  NEUROLOGICAL: Loss of strength in LE and hands b/l; No headaches, memory loss, numbness, or tremors  SKIN: No itching, burning, rashes, or lesions    Allergies and Intolerances:        Allergies:  	No Known Allergies:     Home Medications:   * Patient Currently Takes Medications as of 09-Jun-2021 13:57 documented in Structured Notes  · 	pantoprazole 40 mg oral delayed release tablet: 1 tab(s) orally once a day, am  · 	lactulose 20 g oral powder for reconstitution: 1 each orally once a day,   · 	ferrous sulfate 324 mg (65 mg elemental iron) oral delayed release tablet: 1 tab(s) orally once a day  · 	propranolol 10 mg oral tablet: 1 tab(s) orally 2 times a day  · 	diclofenac potassium 50 mg oral tablet: 1 tab(s) orally 3 times a day  · 	Multiple Vitamins oral capsule: 1 cap(s) orally once a day, last dose 6/15/21  · 	Calcium 600+D oral tablet: 1 tab(s) orally once a day    Patient History:    Past Medical, Past Surgical, and Family History:  PAST MEDICAL HISTORY:  Guillain-[place] syndrome 1996 after flu vaccine    Hematochezia 2/2 colonic AVMs s/p cauterization and hemorrhoids (11/2019)    HTN (hypertension)     Liver cirrhosis alcoholic/WALL cirrhosis c/b variceal bleed s/p banding (8/2018) and hepatic encephalopathy (several years ago)    Melena 2/2 duodenal ulcers s/p argon plasma coagulation (4/2020)    Pancreatitis 2/2 choledocholithiasis s/p ERCP with stone extraction and plastic stent placement (11/2019, stent now removed)    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     PAST SURGICAL HISTORY:  H/O inguinal hernia repair     History of hip replacement 10/2020    History of repair of hip fracture."  <End of quote from Admission H&P>        EXAMINATION    Awake, alert.  Supine in bed.  Fluent accented English.   EOMI; confrontation visual fields grossly intact.  Normal facial/lingual movements.  No tongue atrophy.  Somewhat hard of hearing.      Quadriparetic with asymmetries, weaker in the LEs.        Reflex                           Right    Left   Comment    Jaw jerk                             absent  Scapulohumeral               0        0  Pectoralis                         0       2  Biceps                             3        2  low amplitude  Triceps                            3-      3- very low amplitudes  Brachiorad                       0       0  Fing flex                           1       0  Palmer                     absent  absent  Hip add                           tr       0  Patellar                            2-      0  Gastroc                            0      0  Plantar                      extensor flexor      No spontaneous fasciculations observed of tongue, or limbs.  No percussion-induced fasciculations of myotonia of limbs.   Patient with slowly progressively worsening weakness predominantly of the lower extremities.  He says that it first became evident to him in October 2020 after R hip replacement surgery, getting to the point of being bedbound.  He reports that since hip replacement surgery he has been unable to bear weight on the RLE, and barely on the LLE.     On review of the EMR I note the following relevent information:    From the  H&P of 10/21/20:  "80y Male Hx Cirrhosis, HTN s/p R hip IMN for basicervical hip fracture in August 19 2020 presents now with failure of hardware."    On 10/22/20 he underwent removal of the intramedullary nail, and total hip arthroplasty.      From the H&P of 8/19/20, when he sustained the hip fracture, I note:  "79 yo male s/p mechanical fall on way to bathroom.  Pt is community walker with walker/cane, did not use on way to bathroom this am, tripped and fell.  Denies head trauma , syncope. Pt states unable to get up secondary to right hip pain.  Pt brought to [facility] , XR reveal right basicervical FN fracture"      IN RETROSPECT, THIS PATIENT HAS BEEN SYMPTOMATICALLY PARAPARETIC FOR A LONG TIME.      NOTE: The history of CIDP/GBS and the history of getting COVID vaccination reported in the HPI quoted below are red herrings; they do not account for why he is quadriparetic now.        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 After the episode pt was able to ambulate using a cane and eventually regained normal strength. In october pt started experiencing tingling in the toes which eventually caused him to fall leading to Right sided hip fracture which was surgically repaired. Pt continued rehab after surgery and was able to ambulate using walker. In february 2021 pt received the 2nd dose of Pfizer vaccine and the tingling sensation in his toes started to get worse.  For the last 6 weeks pt has been bed bound as he has little to no strength in his lower extremities. Pt went to the clinic of Dr. Clarke where he was examined and told to come to the ED. He was brought in by his daughter and wife who were contacted to get most of the history as pt wasn't too clear on the timeline.      Review of Systems:  Review of Systems: REVIEW OF SYSTEMS:  CONSTITUTIONAL: Weight loss; No fever or fatigue  RESPIRATORY: No cough, wheezing, chills or hemoptysis; No shortness of breath  CARDIOVASCULAR: No chest pain, palpitations, dizziness, or leg swelling  GASTROINTESTINAL: No abdominal pain. No nausea, vomiting, or hematemesis; No diarrhea or constipation. No melena or hematochezia.  GENITOURINARY: No dysuria or hematuria, urinary frequency  NEUROLOGICAL: Loss of strength in LE and hands b/l; No headaches, memory loss, numbness, or tremors  SKIN: No itching, burning, rashes, or lesions    Allergies and Intolerances:        Allergies:  	No Known Allergies:     Home Medications:   * Patient Currently Takes Medications as of 09-Jun-2021 13:57 documented in Structured Notes  · 	pantoprazole 40 mg oral delayed release tablet: 1 tab(s) orally once a day, am  · 	lactulose 20 g oral powder for reconstitution: 1 each orally once a day,   · 	ferrous sulfate 324 mg (65 mg elemental iron) oral delayed release tablet: 1 tab(s) orally once a day  · 	propranolol 10 mg oral tablet: 1 tab(s) orally 2 times a day  · 	diclofenac potassium 50 mg oral tablet: 1 tab(s) orally 3 times a day  · 	Multiple Vitamins oral capsule: 1 cap(s) orally once a day, last dose 6/15/21  · 	Calcium 600+D oral tablet: 1 tab(s) orally once a day    Patient History:    Past Medical, Past Surgical, and Family History:  PAST MEDICAL HISTORY:  Guillain-[place] syndrome 1996 after flu vaccine    Hematochezia 2/2 colonic AVMs s/p cauterization and hemorrhoids (11/2019)    HTN (hypertension)     Liver cirrhosis alcoholic/WALL cirrhosis c/b variceal bleed s/p banding (8/2018) and hepatic encephalopathy (several years ago)    Melena 2/2 duodenal ulcers s/p argon plasma coagulation (4/2020)    Pancreatitis 2/2 choledocholithiasis s/p ERCP with stone extraction and plastic stent placement (11/2019, stent now removed)    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     PAST SURGICAL HISTORY:  H/O inguinal hernia repair     History of hip replacement 10/2020    History of repair of hip fracture."  <End of quote from Admission H&P>        EXAMINATION    Awake, alert.  Supine in bed.  Fluent accented English.   EOMI; confrontation visual fields grossly intact.  Normal facial/lingual movements.  No tongue atrophy.  Somewhat hard of hearing.      Quadriparetic with asymmetries, weaker in the LEs.      He barely manages apposition with the L thumb; unable to appose with the R thumb.  R biceps notable weaker than the L.  R triceps extremely weak.  Thenar/hypothenar atrophy bilaterally.  He barely moves the RLE.  Can only partially elevate the LLE.      Reflex                           Right    Left   Comment    Jaw jerk                             absent  Scapulohumeral               0        0  Pectoralis                         0       2  Biceps                             3        2  low amplitude  Triceps                            3-      3- very low amplitudes  Brachiorad                       0       0  Fing flex                           1       0  Palmer                     absent  absent  Hip add                           tr       0  Patellar                            2-      0  Gastroc                            0      0  Plantar                      extensor flexor    No spontaneous fasciculations observed of tongue, or limbs.  No percussion-induced fasciculations of myotonia of limbs.      Pin sensation:  Subjectively less R forehead vs L, L face vs R.  Subjectively decreased both LEs but more so for the RLE.    Patient with slowly progressively worsening weakness predominantly of the lower extremities.  He says that it first became evident to him in October 2020 after R hip replacement surgery, getting to the point of being bedbound.  He reports that since hip replacement surgery he has been unable to bear weight on the RLE, and barely on the LLE.     On review of the EMR I note the following relevent information:    From the  H&P of 10/21/20:  "80y Male Hx Cirrhosis, HTN s/p R hip IMN for basicervical hip fracture in August 19 2020 presents now with failure of hardware."    On 10/22/20 he underwent removal of the intramedullary nail, and total hip arthroplasty.      From the H&P of 8/19/20, when he sustained the hip fracture, I note:  "81 yo male s/p mechanical fall on way to bathroom.  Pt is community walker with walker/cane, did not use on way to bathroom this am, tripped and fell.  Denies head trauma , syncope. Pt states unable to get up secondary to right hip pain.  Pt [facility] , XR reveal right basicervical FN fracture"      IN RETROSPECT, THIS PATIENT HAS BEEN SYMPTOMATICALLY PARAPARETIC FOR A LONG TIME.      NOTE: The history of CIDP/GBS and the history of getting COVID vaccination reported in the HPI quoted below are red herrings; they do not account for why he is quadriparetic now.        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 After the episode pt was able to ambulate using a cane and eventually regained normal strength. In october pt started experiencing tingling in the toes which eventually caused him to fall leading to Right sided hip fracture which was surgically repaired. Pt continued rehab after surgery and was able to ambulate using walker. In february 2021 pt received the 2nd dose of Pfizer vaccine and the tingling sensation in his toes started to get worse.  For the last 6 weeks pt has been bed bound as he has little to no strength in his lower extremities. Pt went to the clinic of Dr. Clarke where he was examined and told to come to the ED. He was brought in by his daughter and wife who were contacted to get most of the history as pt wasn't too clear on the timeline.      Review of Systems:  Review of Systems: REVIEW OF SYSTEMS:  CONSTITUTIONAL: Weight loss; No fever or fatigue  RESPIRATORY: No cough, wheezing, chills or hemoptysis; No shortness of breath  CARDIOVASCULAR: No chest pain, palpitations, dizziness, or leg swelling  GASTROINTESTINAL: No abdominal pain. No nausea, vomiting, or hematemesis; No diarrhea or constipation. No melena or hematochezia.  GENITOURINARY: No dysuria or hematuria, urinary frequency  NEUROLOGICAL: Loss of strength in LE and hands b/l; No headaches, memory loss, numbness, or tremors  SKIN: No itching, burning, rashes, or lesions    Allergies and Intolerances:        Allergies:  	No Known Allergies:     Home Medications:   * Patient Currently Takes Medications as of 09-Jun-2021 13:57 documented in Structured Notes  · 	pantoprazole 40 mg oral delayed release tablet: 1 tab(s) orally once a day, am  · 	lactulose 20 g oral powder for reconstitution: 1 each orally once a day,   · 	ferrous sulfate 324 mg (65 mg elemental iron) oral delayed release tablet: 1 tab(s) orally once a day  · 	propranolol 10 mg oral tablet: 1 tab(s) orally 2 times a day  · 	diclofenac potassium 50 mg oral tablet: 1 tab(s) orally 3 times a day  · 	Multiple Vitamins oral capsule: 1 cap(s) orally once a day, last dose 6/15/21  · 	Calcium 600+D oral tablet: 1 tab(s) orally once a day    Patient History:    Past Medical, Past Surgical, and Family History:  PAST MEDICAL HISTORY:  Guillain-[place] syndrome 1996 after flu vaccine    Hematochezia 2/2 colonic AVMs s/p cauterization and hemorrhoids (11/2019)    HTN (hypertension)     Liver cirrhosis alcoholic/WALL cirrhosis c/b variceal bleed s/p banding (8/2018) and hepatic encephalopathy (several years ago)    Melena 2/2 duodenal ulcers s/p argon plasma coagulation (4/2020)    Pancreatitis 2/2 choledocholithiasis s/p ERCP with stone extraction and plastic stent placement (11/2019, stent now removed)    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     PAST SURGICAL HISTORY:  H/O inguinal hernia repair     History of hip replacement 10/2020    History of repair of hip fracture."  <End of quote from Admission H&P>        EXAMINATION    Awake, alert.  Supine in bed.  Fluent accented English.   EOMI; confrontation visual fields grossly intact.  Normal facial/lingual movements.  No tongue atrophy.  Somewhat hard of hearing.      Quadriparetic with asymmetries, weaker in the LEs.      He barely manages apposition with the L thumb; unable to appose with the R thumb.  R biceps notable weaker than the L.  R triceps extremely weak.  Thenar/hypothenar atrophy bilaterally.  He barely moves the RLE.  Can only partially elevate the LLE.      Reflex                           Right    Left   Comment    Jaw jerk                             absent  Scapulohumeral               0        0  Pectoralis                         0       2  Biceps                             3        2  low amplitude  Triceps                            3-      3- very low amplitudes  Brachiorad                       0       0  Fing flex                           1       0  Palmer                     absent  absent  Hip add                           tr       0  Patellar                            2-      0  Gastroc                            0      0  Plantar                      extensor flexor    No spontaneous fasciculations observed of tongue, or limbs.  No percussion-induced fasciculations of myotonia of limbs.      Pin sensation:  Subjectively less R forehead vs L, L face vs R.  Subjectively decreased both LEs but more so for the RLE.       He underwent C- and T-spine MR studies.  No relevant findings from T-spine MR.  Per radiology report of C-spine MR:  "INTERPRETATION:  MRI cervical spine without contrast  History progressive upper and lower extremity weakness  Comparison CT performed the prior day    There is no compression deformity or subluxation or marrow infiltration or edema. There is mild hypertrophic pannus dorsal to the odontoid without brainstem impingement    There is moderate degenerative loss of disc height at C2-3. There is a moderately large diffuse broad-based protrusion resulting in spinal stenosis with a residual AP spinal canal dimension of 2 mm and moderate spinal cord compression. There is mild increased T2 intrasubstance spinal cord signal consistent with myelomalacia or edema. Uncinate and facet hypertrophy contributes to moderate bilateral foraminal stenosis at this level    Severe degenerative disc change and mild broad dorsal osteophyte involves the other levels from C3-4 through C6-7, encroaching on and slightly displacing but not frankly compressing the spinal cord and resulting in mild spinal stenosis at those levels. Uncinate and facet hypertrophy results in multilevel foraminal stenosis, mild bilaterally at C3-4, severe on the left at C4-5, severe on the left and moderate on the right at C5-C6, and severe bilaterally at C6-7.    IMPRESSION:  Spinal stenosis and cord compression related to a broad-based disc protrusion of uncertain acuity at C2-3 as detailed above"

## 2021-07-22 NOTE — CONSULT NOTE ADULT - ASSESSMENT
To Be Completed      Cervical spondylosis.    Severe multi-level cervical cord compression.    Cervical myelopathy with quadriparesis.      Pannus at C2-C3 on MR c-spine raises the question of RA.      RECOMMENDATIONS    Spine surgical consultation.    [place]-J hard collar - medium.      RF, CCP (cyclic citrullinated peptide).    Respiratory to measure NIF (ngative inspiratory force) and if possible FEV1. Cervical spondylosis.    Severe multi-level cervical cord compression.    Cervical myelopathy with quadriparesis.      Pannus at C2-C3 on MR c-spine raises the question of RA.    Progressive weakness that extends back for an unknown amount of time given that when he fell in August 2020 he had already been using assistive devices for ? long.       RECOMMENDATIONS    Spine surgical consultation.    [place]-[employer]tex hard collar - medium.      RF, CCP (cyclic citrullinated peptide).    Respiratory to measure NIF (negative inspiratory force) and if possible FEV1. Cervical spondylosis.    Severe multi-level cervical cord compression.    Cervical myelopathy with quadriparesis, due to cervical spondylosis.    Progressive LE due to the myelopathy.  Weakness extends back for an unknown amount of time given that when he fell in August 2020 he had already been using assistive devices for ? long.    Multi-level cervical radiculopathies with resultant atrophic muscles.    Pannus at C2-C3 on MR c-spine raises the question of RA.         RECOMMENDATIONS    Spine surgical consultation.    [place]-J hard collar - medium.      RF, CCP (cyclic citrullinated peptide).    Respiratory to measure NIF (negative inspiratory force) and if possible FEV1.

## 2021-07-22 NOTE — PROGRESS NOTE ADULT - PROBLEM SELECTOR PLAN 1
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 No acute fx  -CT brain: mild to moderate cerebral volume loss and involutional change. No CT evidence of an acute infarct, hemorrhage, or mass lesion.  c/w thiamine, folic acid and vitamin B12  Follow MRI brain and C,T spine  Follow NIH and vital capacity Q6  Dr. Gallegos consulted  - Recommended 100 mg thymine, 5 mg Folic Acid, 1000 Micrograms of B12

## 2021-07-22 NOTE — CONSULT NOTE ADULT - NSCONSULTADDITIONALINFOA_GEN_ALL_CORE
81 yr old with liver cirrhosis, remote history of Guillain-Barré syndrome comes in with a couple of months of progressive weakness. He has the COVID-19 vaccine prior to symptoms. His wife states he has had numbness of the hands and weakness for a long time but he was able to ambulate. His MRI shows severe spinal canal stenosis worse at C2-3. He has weakness of upper and lower extremities. I think his symptoms are related to the stenosis. I told his wife that we will do a risks assessment including cardiac and medical work-up to establish the possibility of surgery.

## 2021-07-22 NOTE — ADVANCED PRACTICE NURSE CONSULT - ASSESSMENT
This is a 81yr old male patient admitted for Nervous System Degenerative Disease, presenting with healed wounds to the Gluteal Fold and Bilateral Coccyx

## 2021-07-22 NOTE — PROGRESS NOTE ADULT - SUBJECTIVE AND OBJECTIVE BOX
NP Note discussed with  Primary Attending    INTERVAL HPI/OVERNIGHT EVENTS: No acute medical complaints - pending MRI    MEDICATIONS  (STANDING):  cyanocobalamin 1000 MICROGram(s) Oral daily  enoxaparin Injectable 40 milliGRAM(s) SubCutaneous daily  folic acid 5 milliGRAM(s) Oral daily  pantoprazole    Tablet 40 milliGRAM(s) Oral before breakfast  sodium chloride 0.9%. 1000 milliLiter(s) (60 mL/Hr) IV Continuous <Continuous>  thiamine 100 milliGRAM(s) Oral daily    MEDICATIONS  (PRN):      __________________________________________________  REVIEW OF SYSTEMS:    CONSTITUTIONAL: No fever,   EYES: no acute visual disturbances  NECK: No pain or stiffness  RESPIRATORY: No cough; No shortness of breath  CARDIOVASCULAR: No chest pain, no palpitations  GASTROINTESTINAL: No pain. No nausea or vomiting; No diarrhea   NEUROLOGICAL: No headache or numbness, no tremors  MUSCULOSKELETAL: + weakness   GENITOURINARY: no dysuria, no frequency, no hesitancy  PSYCHIATRY: no depression , no anxiety  ALL OTHER  ROS negative        Vital Signs Last 24 Hrs  T(C): 36.6 (22 Jul 2021 13:40), Max: 36.8 (21 Jul 2021 19:54)  T(F): 97.8 (22 Jul 2021 13:40), Max: 98.3 (21 Jul 2021 19:54)  HR: 61 (22 Jul 2021 13:40) (50 - 109)  BP: 145/78 (22 Jul 2021 13:40) (105/73 - 145/78)  BP(mean): --  RR: 16 (22 Jul 2021 13:40) (16 - 17)  SpO2: 100% (22 Jul 2021 13:40) (95% - 100%)    ________________________________________________  Physical Exam: PHYSICAL EXAMINATION:  GENERAL: NAD, AAOx3  HEAD: AT/NC  EYES: conjunctiva and sclera clear  NECK: supple, No JVD noted, Normal thyroid  CHEST/LUNG: CTABL; no rales, rhonchi, wheezing, or rubs  HEART: regular rate and rhythm; no murmurs, rubs, or gallops  ABDOMEN: soft, nontender, nondistended; Bowel sounds present  EXTREMITIES:  2+ Peripheral Pulses, No clubbing, cyanosis, or edema  MUSCULOSKELETAL: 2/5 muscle strength in LE B/L, 3/5 muscle strength in UE B/L, limited ROM of hands b/l, areflexia of left LE and UE, 2+ reflexes of right LE and UE  SKIN: warm dry  _________________________________________________  LABS:                        13.0   5.38  )-----------( 124      ( 22 Jul 2021 07:38 )             41.2     07-22    142  |  108  |  26<H>  ----------------------------<  100<H>  3.9   |  25  |  1.15    Ca    8.9      22 Jul 2021 07:38  Phos  2.6     07-22  Mg     1.9     07-22    TPro  7.0  /  Alb  2.5<L>  /  TBili  0.9  /  DBili  x   /  AST  22  /  ALT  12  /  AlkPhos  150<H>  07-22        CAPILLARY BLOOD GLUCOSE            RADIOLOGY & ADDITIONAL TESTS:   < from: CT Head No Cont (07.21.21 @ 15:13) >  IMPRESSION:    1)  mild to moderate cerebral volume loss and involutional change. No CT evidence of an acute infarct, hemorrhage, or mass lesion..  2)  follow-up MR imaging may be considered for further assessment, if clinically warranted.      < end of copied text >  < from: CT Cervical Spine No Cont (07.21.21 @ 15:14) >  IMPRESSION:    Multilevel degenerative changes. No acute fracture identified.    < end of copied text >    Imaging Personally Reviewed:  YES    Consultant(s) Notes Reviewed:   YES    Plan of care was discussed with patient and /or primary care giver; all questions and concerns were addressed and care was aligned with patient's wishes.

## 2021-07-22 NOTE — CONSULT NOTE ADULT - SUBJECTIVE AND OBJECTIVE BOX
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 After the episode pt was able to ambulate using a cane and eventually regained normal strength. In october pt started experiencing tingling in the toes which eventually caused him to fall leading to Right sided hip fracture which was surgically repaired. Pt continued rehab after surgery and was able to ambulate using walker. In february 2021 pt received the 2nd dose of Pfizer vaccine and the tingling sensation in his toes started to get worse.  For the last 6 weeks pt has been bed bound as he has little to no strength in his lower extremities. Pt went to the clinic of Dr. Clarke where he was examined and told to come to the ED. He was brought in by his daughter and wife who were contacted to get most of the history as pt wasn't too clear on the timeline.        (21 Jul 2021 17:05)    seen at bedside - patient states that he has weakness in his body. he is unable to walk around. he has been getting progressively weaker. states that he fell last year requiring R Hip replacement He has been feeling very weak.   Gesticulates while talking. his arms are flexing. some stiffness noted, but able to move his fingers while talking and point to things.   Pt denies Chest pain, SOB, dyspnea, paresthesias, N/V/D, abdominal pain, syncope, or pain anywhere else.   denies incontinence.   admits to some mild numbnes tingling in his fingers and toes at times.         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)    Pancreatitis  2/2 choledocholithiasis s/p ERCP with stone extraction and plastic stent placement (11/2019, stent now removed)    Hematochezia  2/2 colonic AVMs s/p cauterization and hemorrhoids (11/2019)    Melena  2/2 duodenal ulcers s/p argon plasma coagulation (4/2020)    H/O inguinal hernia repair    History of repair of hip fracture    History of hip replacement  10/2020        ALLERGIES: No Known Allergies      MEDICATIONS: cyanocobalamin 1000 MICROGram(s) Oral daily  enoxaparin Injectable 40 milliGRAM(s) SubCutaneous daily  folic acid 5 milliGRAM(s) Oral daily  furosemide    Tablet 20 milliGRAM(s) Oral daily  pantoprazole    Tablet 40 milliGRAM(s) Oral before breakfast  propranolol 10 milliGRAM(s) Oral daily  sodium chloride 0.9%. 1000 milliLiter(s) IV Continuous <Continuous>  thiamine 100 milliGRAM(s) Oral daily      PHYSICAL EXAM:    Vital Signs Last 24 Hrs  T(C): 36.6 (22 Jul 2021 13:40), Max: 36.8 (21 Jul 2021 19:54)  T(F): 97.8 (22 Jul 2021 13:40), Max: 98.3 (21 Jul 2021 19:54)  HR: 61 (22 Jul 2021 13:40) (50 - 109)  BP: 145/78 (22 Jul 2021 13:40) (112/64 - 145/78)  BP(mean): --  RR: 16 (22 Jul 2021 13:40) (16 - 17)  SpO2: 100% (22 Jul 2021 13:40) (95% - 100%)    Gen: well developed, well nourished, comfortable  MSK:  Skin pink, warm. No open lesions.  no ct  calves soft  DP/PT 2+, brisk b/l  nvi silt  5/5 strength ehl/ta/gastroc b/l.      BILATERAL UPPER EXTREMITIES: ABLE TO FLEX SHOULDERS- HANDS GO ABOVE HEAD. SOME RIGHT SHOULDER JOINT PAIN WITH FLEXION AND EXTENSION. MUSCLE STRENGTH 5/5  DISTAL PULSES 2+. SKIN INTACT, GROSSLY NVI. M/U/R/AIN/PIN INTACT BILATERALLY. ABLE TO SHRUG SHOULDER, FLEX, AND EXTEND SHOULDERS AND ELBOWS AGAINST RESISTANCE  FINE MOTOR SKILLS OF FINGERS DECREASED, BUT ABLE TO GRASP AND MANIPULATE TOOLS -  STRENGTH 5/5    BILATERAL LOWER EXTREMITIES: MILD PAIN WITH ROTATION OF THE RIGHT HIP S/P RIGHT HIP REPLACEMENT. NO PAIN IN THE LEFT HIP WITH ROTATION AND FLEXION.   NO PAIN WITH FLEXION OR EXTENSION OF THE KNEES.   NO CALF TENDERNESS. ABLE TO FLEX AND EXTEND (+) EHL/TA/GASTROC. DISTAL PULSES 2+ BILATERALLY:     SPINE: NO TENDERNESS OR RADIATING PAIN OR PARESTHESIAS ELICITED DURING EXAM FROM CERVICAL TO THORACIC TO LUMBAR TO SACRAL SPINE. WITH PALPATION.     LABS:                        13.0   5.38  )-----------( 124      ( 22 Jul 2021 07:38 )             41.2     07-22    142  |  108  |  26<H>  ----------------------------<  100<H>  3.9   |  25  |  1.15    Ca    8.9      22 Jul 2021 07:38  Phos  2.6     07-22  Mg     1.9     07-22    TPro  7.0  /  Alb  2.5<L>  /  TBili  0.9  /  DBili  x   /  AST  22  /  ALT  12  /  AlkPhos  150<H>  07-22        RADIOLOGY:     < from: MR Thoracic Spine No Cont (07.22.21 @ 13:12) >    EXAM:  MR SPINE THORACIC                            PROCEDURE DATE:  07/22/2021          INTERPRETATION:  MRI thoracic spine without contrast    History upper and lower extremity weakness    There is no thoracic compression deformity or subluxationor marrow infiltration or edema. There are flowing ventral syndesmophytes in the mid to lower thoracic levels without significant dorsal bulging or ridging or focal protrusion. The spinal cord is normal in signal and contour. There is no spinal stenosis or epidural mass or collection.    IMPRESSION:  Thoracic spondylosis with ventral DISH. Normal spinal canal contents.    --- End of Report ---            RADHA GILLIS MD; Attending Radiologist  This document has been electronically signed. Jul 22 2021  1:47PM    < end of copied text >  < from: MR Cervical Spine No Cont (07.22.21 @ 13:12) >    EXAM:  MR SPINE CERVICAL                            PROCEDURE DATE:  07/22/2021          INTERPRETATION:  MRI cervical spine without contrast  History progressive upper and lower extremity weakness  Comparison CT performed the prior day    There isno compression deformity or subluxation or marrow infiltration or edema. There is mild hypertrophic pannus dorsal to the odontoid without brainstem impingement    There is moderate degenerative loss of disc height at C2-3. There is a moderately largediffuse broad-based protrusion resulting in spinal stenosis with a residual AP spinal canal dimension of 2 mm and moderate spinal cord compression. There is mild increased T2 intrasubstance spinal cord signal consistent with myelomalacia or edema. Uncinate and facet hypertrophy contributes to moderate bilateral foraminal stenosis at this level    Severe degenerative disc change and mild broad dorsal osteophyte involves the other levels from C3-4 through C6-7, encroaching on and slightly displacing but not frankly compressing the spinal cord and resulting in mild spinal stenosis at those levels. Uncinate and facet hypertrophy results in multilevel foraminal stenosis, mild bilaterally at C3-4, severe on the left at C4-5, severe on the left and moderate on the right at C5-C6, and severe bilaterally at C6-7.    IMPRESSION:  Spinal stenosis and cord compression related to a broad-based disc protrusion of uncertain acuity at C2-3 as detailed above    Findings discussed with Samantha JONES    --- End of Report ---            RADHA GILLIS MD; Attending Radiologist  This document has been electronically signed. Jul 22 2021  1:48PM    < end of copied text >        A/P:   81y Male with: CERVICAL SPINAL STENOSIS - SEVERE. CHRONIC SYMPTOMS.     #  -  Recommendation: Patient may require cervical spine surgical intervention for spinal stenosis, but with the patient's age, risks, and chronicity of symptoms, leaning closer to conservative treatment.   PAtient prefers conservative treatment at this time.    - recommend MR Lumbar spine  -  All the patient's questions were answered. Pt understands.   -  Pain control  -  DVT prophylaxis  -  Daily PT WBAT BLLE  -  Case d/w Dr. Knott  -  Pt is to follow up with Dr. Knott after discharge at: Follow up with Dr. Knott in 1-2 weeks at [facility], call [phone]

## 2021-07-22 NOTE — PATIENT PROFILE ADULT - NSASFALLNEEDSASSISTWITH_GEN_A_NUR
Immunizations:        Synagis:       Screenings:    Latest CCHD screen:      Latest car seat screen:      Latest hearing screen:        [place] screen:  Screen#: [id number]  Screen Date: 2021  Screen Comment: N/A    Screen#: [id number]  Screen Date: 2021  Screen Comment: N/A    Screen#: [id number]  Screen Date: 2021  Screen Comment: N/A     standing/walking/toileting Please have SW team investigate if patient can go to 28d or longer sub abuse program immediately after discharge when med stable.

## 2021-07-22 NOTE — ADVANCED PRACTICE NURSE CONSULT - RECOMMEDATIONS
-Apply Zinc Oxide Moisture Barrier Cream to the Bilateral Gluteal and Gluteal Fold areas b.i.d PRN  -Elevate/float the patients heels using heel protectors and reposition the patient Q 2hrs using wedges or pillows

## 2021-07-22 NOTE — PROGRESS NOTE ADULT - ASSESSMENT
Patient is a 81 year old male with PMHx of Guillain-[place] s/p flu vaccine 25 years ago, HTN, liver cirrhosis and PSHx of right hip replacement presents to the ED with progressive lower extremity weakness since his COVID vaccine in Feb 2021 . Patient is being admitted for neuro work up of weakness. Pending neuro consult and MRI

## 2021-07-22 NOTE — ED ADULT NURSE REASSESSMENT NOTE - NS ED NURSE REASSESS COMMENT FT1
Received pt from NUUN Perkins, pt is observed laying in bed, breathing room air, in no repsiratory distress at time of assessment. Pt is A&O x3, able to Received pt from NUNU Perkins, pt is observed laying in bed, breathing room air, in no respiratory distress at time of assessment. Pt is A&O x3, able to make needs known, expresses weakness to bilateral lower extremities. B/L extremities are observed dry and scaly, otherwise pt denies any distress/discomfort at this time. Pt is not observed ambulating in ED, he stated he is not able to do so on his own at this time. Sacral area observed to have dried scaly scars of previous wounds, no open skin observed. Right hand #[id number] in place, no meds administered at this time.   Admitted to ED, report given to NUNU Amaya for NUNU Pate for [address]. Pt transported on stretcher to floor in no distress. Received pt from NUNU Perkins, pt is observed laying in bed, breathing room air, in no respiratory distress at time of assessment. Pt is A&O x3, able to make needs known, expresses weakness to bilateral lower extremities. B/L extremities are observed dry and scaly, otherwise pt denies any distress/discomfort at this time. Pt is not observed ambulating in ED, he stated he is not able to do so on his own at this time. Sacral area observed to have dried scaly scars of previous wounds, no open skin observed. Right hand #22Ga in place, no meds administered at this time.   Admitted to ED, report given to NUNU Amaya for NUNU Shah for [address]. Pt transported on stretcher to floor in no distress.

## 2021-07-23 DIAGNOSIS — Z02.9 ENCOUNTER FOR ADMINISTRATIVE EXAMINATIONS, UNSPECIFIED: ICD-10-CM

## 2021-07-23 DIAGNOSIS — Z71.89 OTHER SPECIFIED COUNSELING: ICD-10-CM

## 2021-07-23 DIAGNOSIS — M48.00 SPINAL STENOSIS, SITE UNSPECIFIED: ICD-10-CM

## 2021-07-23 DIAGNOSIS — G61.0 GUILLAIN-BARRE SYNDROME: ICD-10-CM

## 2021-07-23 LAB
ANION GAP SERPL CALC-SCNC: 6 MMOL/L — SIGNIFICANT CHANGE UP (ref 5–17)
BUN SERPL-MCNC: 20 MG/DL — HIGH (ref 7–18)
CALCIUM SERPL-MCNC: 8.5 MG/DL — SIGNIFICANT CHANGE UP (ref 8.4–10.5)
CHLORIDE SERPL-SCNC: 107 MMOL/L — SIGNIFICANT CHANGE UP (ref 96–108)
CO2 SERPL-SCNC: 26 MMOL/L — SIGNIFICANT CHANGE UP (ref 22–31)
CREAT ?TM UR-MCNC: 42 MG/DL — SIGNIFICANT CHANGE UP
CREAT SERPL-MCNC: 1.03 MG/DL — SIGNIFICANT CHANGE UP (ref 0.5–1.3)
GLUCOSE SERPL-MCNC: 84 MG/DL — SIGNIFICANT CHANGE UP (ref 70–99)
HCT VFR BLD CALC: 38.1 % — LOW (ref 39–50)
HGB BLD-MCNC: 12.2 G/DL — LOW (ref 13–17)
MCHC RBC-ENTMCNC: 26.1 PG — LOW (ref 27–34)
MCHC RBC-ENTMCNC: 32 GM/DL — SIGNIFICANT CHANGE UP (ref 32–36)
MCV RBC AUTO: 81.4 FL — SIGNIFICANT CHANGE UP (ref 80–100)
NRBC # BLD: 0 /100 WBCS — SIGNIFICANT CHANGE UP (ref 0–0)
PLATELET # BLD AUTO: 115 K/UL — LOW (ref 150–400)
POTASSIUM SERPL-MCNC: 3.9 MMOL/L — SIGNIFICANT CHANGE UP (ref 3.5–5.3)
POTASSIUM SERPL-SCNC: 3.9 MMOL/L — SIGNIFICANT CHANGE UP (ref 3.5–5.3)
RBC # BLD: 4.68 M/UL — SIGNIFICANT CHANGE UP (ref 4.2–5.8)
RBC # FLD: 16.2 % — HIGH (ref 10.3–14.5)
RHEUMATOID FACT SERPL-ACNC: <10 IU/ML — SIGNIFICANT CHANGE UP (ref 0–13)
SODIUM SERPL-SCNC: 139 MMOL/L — SIGNIFICANT CHANGE UP (ref 135–145)
SODIUM UR-SCNC: 38 MMOL/L — SIGNIFICANT CHANGE UP
WBC # BLD: 4.66 K/UL — SIGNIFICANT CHANGE UP (ref 3.8–10.5)
WBC # FLD AUTO: 4.66 K/UL — SIGNIFICANT CHANGE UP (ref 3.8–10.5)

## 2021-07-23 PROCEDURE — 72148 MRI LUMBAR SPINE W/O DYE: CPT | Mod: 26

## 2021-07-23 PROCEDURE — 99232 SBSQ HOSP IP/OBS MODERATE 35: CPT

## 2021-07-23 RX ORDER — POLYETHYLENE GLYCOL 3350 17 G/17G
17 POWDER, FOR SOLUTION ORAL DAILY
Refills: 0 | Status: DISCONTINUED | OUTPATIENT
Start: 2021-07-23 | End: 2021-07-25

## 2021-07-23 RX ORDER — GLUCAGON INJECTION, SOLUTION 0.5 MG/.1ML
1 INJECTION, SOLUTION SUBCUTANEOUS ONCE
Refills: 0 | Status: DISCONTINUED | OUTPATIENT
Start: 2021-07-23 | End: 2021-07-25

## 2021-07-23 RX ORDER — INSULIN LISPRO 100/ML
VIAL (ML) SUBCUTANEOUS
Refills: 0 | Status: DISCONTINUED | OUTPATIENT
Start: 2021-07-23 | End: 2021-07-25

## 2021-07-23 RX ORDER — DEXTROSE 50 % IN WATER 50 %
12.5 SYRINGE (ML) INTRAVENOUS ONCE
Refills: 0 | Status: DISCONTINUED | OUTPATIENT
Start: 2021-07-23 | End: 2021-07-25

## 2021-07-23 RX ORDER — SODIUM CHLORIDE 9 MG/ML
1000 INJECTION, SOLUTION INTRAVENOUS
Refills: 0 | Status: DISCONTINUED | OUTPATIENT
Start: 2021-07-23 | End: 2021-07-25

## 2021-07-23 RX ORDER — NYSTATIN CREAM 100000 [USP'U]/G
1 CREAM TOPICAL
Refills: 0 | Status: DISCONTINUED | OUTPATIENT
Start: 2021-07-23 | End: 2021-07-25

## 2021-07-23 RX ORDER — DEXTROSE 50 % IN WATER 50 %
25 SYRINGE (ML) INTRAVENOUS ONCE
Refills: 0 | Status: DISCONTINUED | OUTPATIENT
Start: 2021-07-23 | End: 2021-07-25

## 2021-07-23 RX ORDER — DEXAMETHASONE 0.5 MG/5ML
6 ELIXIR ORAL EVERY 6 HOURS
Refills: 0 | Status: DISCONTINUED | OUTPATIENT
Start: 2021-07-23 | End: 2021-07-23

## 2021-07-23 RX ORDER — SENNA PLUS 8.6 MG/1
2 TABLET ORAL AT BEDTIME
Refills: 0 | Status: DISCONTINUED | OUTPATIENT
Start: 2021-07-23 | End: 2021-07-25

## 2021-07-23 RX ORDER — DEXTROSE 50 % IN WATER 50 %
15 SYRINGE (ML) INTRAVENOUS ONCE
Refills: 0 | Status: DISCONTINUED | OUTPATIENT
Start: 2021-07-23 | End: 2021-07-25

## 2021-07-23 RX ORDER — DEXAMETHASONE 0.5 MG/5ML
6 ELIXIR ORAL EVERY 6 HOURS
Refills: 0 | Status: DISCONTINUED | OUTPATIENT
Start: 2021-07-23 | End: 2021-07-25

## 2021-07-23 RX ADMIN — NYSTATIN CREAM 1 APPLICATION(S): 100000 CREAM TOPICAL at 18:24

## 2021-07-23 RX ADMIN — Medication 100 MILLIGRAM(S): at 12:42

## 2021-07-23 RX ADMIN — ENOXAPARIN SODIUM 40 MILLIGRAM(S): 100 INJECTION SUBCUTANEOUS at 12:41

## 2021-07-23 RX ADMIN — Medication 6 MILLIGRAM(S): at 18:23

## 2021-07-23 RX ADMIN — POLYETHYLENE GLYCOL 3350 17 GRAM(S): 17 POWDER, FOR SOLUTION ORAL at 21:06

## 2021-07-23 RX ADMIN — SENNA PLUS 2 TABLET(S): 8.6 TABLET ORAL at 21:05

## 2021-07-23 RX ADMIN — Medication 20 MILLIGRAM(S): at 05:14

## 2021-07-23 RX ADMIN — Medication 6 MILLIGRAM(S): at 12:41

## 2021-07-23 RX ADMIN — PREGABALIN 1000 MICROGRAM(S): 225 CAPSULE ORAL at 12:42

## 2021-07-23 RX ADMIN — PANTOPRAZOLE SODIUM 40 MILLIGRAM(S): 20 TABLET, DELAYED RELEASE ORAL at 06:17

## 2021-07-23 RX ADMIN — Medication 5 MILLIGRAM(S): at 12:42

## 2021-07-23 NOTE — PROGRESS NOTE ADULT - CONVERSATION DETAILS
Discussed about result of CT, MRI spine, options for treatment plan as recommended by orthopedics/ neurosurgery. Pt and wife understand risks vs benefit from possible surgical intervention. However they would like to wait for orthopedics to call next week. Discussed about result of CT, MRI spine, options for treatment plan as recommended by orthopedics/ neurosurgery. Pt and wife understand risks vs benefit from possible surgical intervention. agreeable surgery. none

## 2021-07-23 NOTE — PROGRESS NOTE ADULT - PROBLEM SELECTOR PLAN 8
-Full code  -pt and wife Shannon agreeable for surgical intervention. aware of possible transfer to [facility] next week

## 2021-07-23 NOTE — PROGRESS NOTE ADULT - PROBLEM SELECTOR PLAN 4
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

## 2021-07-23 NOTE — PROGRESS NOTE ADULT - ASSESSMENT
Patient is a 81 year old male with PMHx of Guillain-[place] s/p flu vaccine 25 years ago, HTN, liver cirrhosis and PSHx of right hip replacement presents to the ED with progressive lower extremity weakness since his COVID vaccine in Feb 2021 . Patient is being admitted for neuro work up of weakness.   Patient is a 81 year old male with PMHx of Guillain-[place] s/p flu vaccine 25 years ago, HTN, liver cirrhosis and PSHx of right hip replacement presents to the ED with progressive lower extremity weakness since his COVID vaccine in Feb 2021. was sent by Dr. Cole neurologist to the hospital for worsening quadriparesis. Patient is being admitted for neuro work up of weakness.    -CT cervical spine: Multilevel degenerative changes. No acute fx  -CT brain: mild to moderate cerebral volume loss and involutional change. No CT evidence of an acute infarct, hemorrhage, or mass lesion.  -MRI TS result- Thoracic spondylosis with ventral DISH. Normal spinal canal contents.  -MRI cervical spine shows Spinal stenosis and cord compression related to a broad-based disc protrusion of uncertain acuity at C2-3 as detailed above  -MRI LS result Spondylosis resulting in mild spinal stenosis at L4-5 as above. No acute findings.    Neurology and ortho/spinal surgery consulted. dr. Knott offered cervical spine surgical intervention. Cardiology consulted for clearance. Echo ordered.   started dexamethasone with sliding scales.   Pt will need to transfer to [facility] for surgical intervention next week.

## 2021-07-23 NOTE — PROGRESS NOTE ADULT - PROBLEM SELECTOR PLAN 3
Resolved   likely prerenal   Scr WNL  Monitor BMP daily  avoid nephrotoxic meds -diagnosed 25 yr ago   -see plan above  -neurology dr. Gallegos  -PT consulted

## 2021-07-23 NOTE — PROGRESS NOTE ADULT - PROBLEM SELECTOR PLAN 6
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

## 2021-07-23 NOTE — PROGRESS NOTE ADULT - PROBLEM SELECTOR PLAN 5
IMPROVE VTE Individual Risk Assessment  RISK                                                                Points  [  ] Previous VTE                                                  3  [  ] Thrombophilia                                               2  [  ] Lower limb paralysis                                      2        (unable to hold up >15 seconds)    [  ] Current Cancer                                              2         (within 6 months)  [x  ] Immobilization > 24 hrs                                1  [  ] ICU/CCU stay > 24 hours                              1  [x  ] Age > 60                                                      1  IMPROVE VTE Score _________2, -- for DVT proph    Lovenox on past admission for DVT ppx -Resolved   -likely prerenal   -Scr WNL  -Monitor BMP daily  -avoid nephrotoxic meds

## 2021-07-23 NOTE — PROGRESS NOTE ADULT - ATTENDING COMMENTS
82 y/o m w/ pmhx of liver cirrhosis in setting of ETOH use, CIDP, CKD stage 3 p/w 2 months progressive upper and lower extremity weakness preceded by paresthesias of LE extremities (approx 6months) reportedly, now unable to ambulate     #progressive quadriparesis, MRI spine with severe spinal stenosis with cord compression. Likely chronic as symptom onset > 2 months. No incontinence or saddle anesthesia. Spine surgery evaluation. Check Niff and VC. Appreciate neuro eval  # liver cirrhosis  # hypoalbuminemia  # likely CKD stage 3  # severe protein calorie malnutrition
81 year old M with hx of Guillian   after flu shot, alcoholic cirrhosis, was sent by Dr. Cole neurologist to the hospital for worsening quadriparesis. On MRI C spine found to have severe spinal stenosis with cord compression. No urinary incontinence or saddle anesthesia. Has been having worsening weakness over two months with BLE numbness. Symptoms stable at this time.     Neurosurgery consulted, initial discussion about conservative management but patient unlikely to get better. If patient and family agreeable for surgery, will need to transfer patient to [facility] next week. Discussed with patient today, he is agreeable for surgery but wife and family to discuss tonight and give final answer about whether or not to move forward with surgery. Cardiology called today for pre-op risk assessment. Started on trial of steroids prior to surgery

## 2021-07-23 NOTE — PROGRESS NOTE ADULT - SUBJECTIVE AND OBJECTIVE BOX
NP Note discussed with  Primary Attending    INTERVAL HPI/OVERNIGHT EVENTS: seen at bedside, states feeling okay but c/o persistent weakness to lower legs.  denies any pain, GI issues or constipaton    MEDICATIONS  (STANDING):  cyanocobalamin 1000 MICROGram(s) Oral daily  dexAMETHasone  Injectable 6 milliGRAM(s) IV Push every 6 hours  enoxaparin Injectable 40 milliGRAM(s) SubCutaneous daily  folic acid 5 milliGRAM(s) Oral daily  furosemide    Tablet 20 milliGRAM(s) Oral daily  nystatin Powder 1 Application(s) Topical two times a day  pantoprazole    Tablet 40 milliGRAM(s) Oral before breakfast  propranolol 10 milliGRAM(s) Oral daily  sodium chloride 0.9%. 1000 milliLiter(s) (60 mL/Hr) IV Continuous <Continuous>  thiamine 100 milliGRAM(s) Oral daily    MEDICATIONS  (PRN):      __________________________________________________  REVIEW OF SYSTEMS:    CONSTITUTIONAL: No fever,   EYES: no acute visual disturbances  NECK: No pain or stiffness  RESPIRATORY: No cough; No shortness of breath  CARDIOVASCULAR: No chest pain, no palpitations  GASTROINTESTINAL: No pain. No nausea or vomiting; No diarrhea   NEUROLOGICAL: No headache or numbness, no tremors  MUSCULOSKELETAL: No joint pain, no muscle pain  GENITOURINARY: no dysuria, no frequency, no hesitancy  PSYCHIATRY: no depression , no anxiety  ALL OTHER  ROS negative        Vital Signs Last 24 Hrs  T(C): 36.5 (23 Jul 2021 05:33), Max: 36.6 (22 Jul 2021 13:40)  T(F): 97.7 (23 Jul 2021 05:33), Max: 97.8 (22 Jul 2021 13:40)  HR: 50 (23 Jul 2021 05:33) (50 - 61)  BP: 121/75 (23 Jul 2021 05:33) (121/75 - 145/78)  BP(mean): --  RR: 17 (23 Jul 2021 05:33) (16 - 18)  SpO2: 97% (23 Jul 2021 05:33) (97% - 100%)    ________________________________________________  PHYSICAL EXAM:  GENERAL: NAD  HEENT: Normocephalic;  conjunctivae and sclerae clear; moist mucous membranes;   NECK : supple  CHEST/LUNG: Clear to auscultation bilaterally with good air entry   HEART: S1 S2  regular; no murmurs, gallops or rubs  ABDOMEN: Soft, Nontender, Nondistended; Bowel sounds present  EXTREMITIES: no cyanosis; trace leg edema ; no calf tenderness  SKIN: warm and dry; no rash  NERVOUS SYSTEM:  Awake and alert; Oriented  to place, person and time ; lower leg weakness     _________________________________________________  LABS:                        12.2   4.66  )-----------( 115      ( 23 Jul 2021 07:37 )             38.1     07-23    139  |  107  |  20<H>  ----------------------------<  84  3.9   |  26  |  1.03    Ca    8.5      23 Jul 2021 07:37  Phos  2.6     07-22  Mg     1.9     07-22    TPro  7.0  /  Alb  2.5<L>  /  TBili  0.9  /  DBili  x   /  AST  22  /  ALT  12  /  AlkPhos  150<H>  07-22        CAPILLARY BLOOD GLUCOSE            RADIOLOGY & ADDITIONAL TESTS:    Imaging  Reviewed:  YES    < from: MR Thoracic Spine No Cont (07.22.21 @ 13:12) >    EXAM:  MR SPINE THORACIC                            PROCEDURE DATE:  07/22/2021          INTERPRETATION:  MRI thoracic spine without contrast    History upper and lower extremity weakness    There is no thoracic compression deformity or subluxationor marrow infiltration or edema. There are flowing ventral syndesmophytes in the mid to lower thoracic levels without significant dorsal bulging or ridging or focal protrusion. The spinal cord is normal in signal and contour. There is no spinal stenosis or epidural mass or collection.    IMPRESSION:  Thoracic spondylosis with ventral DISH. Normal spinal canal contents.    --- End of Report ---            RADHA GILLIS MD; Attending Radiologist  This document has been electronically signed. Jul 22 2021  1:47PM    < end of copied text >  < from: MR Cervical Spine No Cont (07.22.21 @ 13:12) >    EXAM:  MR SPINE CERVICAL                            PROCEDURE DATE:  07/22/2021          INTERPRETATION:  MRI cervical spine without contrast  History progressive upper and lower extremity weakness  Comparison CT performed the prior day    There isno compression deformity or subluxation or marrow infiltration or edema. There is mild hypertrophic pannus dorsal to the odontoid without brainstem impingement    There is moderate degenerative loss of disc height at C2-3. There is a moderately largediffuse broad-based protrusion resulting in spinal stenosis with a residual AP spinal canal dimension of 2 mm and moderate spinal cord compression. There is mild increased T2 intrasubstance spinal cord signal consistent with myelomalacia or edema. Uncinate and facet hypertrophy contributes to moderate bilateral foraminal stenosis at this level    Severe degenerative disc change and mild broad dorsal osteophyte involves the other levels from C3-4 through C6-7, encroaching on and slightly displacing but not frankly compressing the spinal cord and resulting in mild spinal stenosis at those levels. Uncinate and facet hypertrophy results in multilevel foraminal stenosis, mild bilaterally at C3-4, severe on the left at C4-5, severe on the left and moderate on the right at C5-C6, and severe bilaterally at C6-7.    IMPRESSION:  Spinal stenosis and cord compression related to a broad-based disc protrusion of uncertain acuity at C2-3 as detailed above    Findings discussed with Samantha JONES    --- End of Report ---            RADHA GILLIS MD; Attending Radiologist  This document has been electronically signed. Jul 22 2021  1:48PM    < end of copied text >  < from: MR Head No Cont (07.22.21 @ 13:11) >    EXAM:  MR BRAIN                            PROCEDURE DATE:  07/22/2021          INTERPRETATION:  MRI brain without contrast  Comparison CT performed the prior day  History lower extremity weakness    There is mild to moderate generalized volume loss without mass effect, cortical edema or hydrocephalus. There is no evidence of acute infarct. There is a single tiny focus of low gradient echo signal lateral to the right occipital horn consistent with a chronic microhemorrhage.. The orbital and sellar contents and cerebellar tonsils are within normal limits.    IMPRESSION:  No acute intracranial findings    --- End of Report ---            RADHA GILLIS MD; Attending Radiologist  This document has been electronically signed. Jul 22 2021  1:31PM    < end of copied text >      < from: CT Cervical Spine No Cont (07.21.21 @ 15:14) >    EXAM:  CT CERVICAL SPINE                            PROCEDURE DATE:  07/21/2021          INTERPRETATION:  CT CERVICAL    INDICATIONS:  neck pain. Trauma.    TECHNIQUE:  Thin section CT imaging was conducted.  3-D, Coronal and sagittal reformations were generated from the axial data.    FINDINGS:    There is alteration of the cervical lordosis which may reflect positioning or spasm.    C1/C2:  The anterior and posterior arches of C1 appear to be intact. There is no C1-C2 subluxation. No odontoidfracture is noted. Base of C2 appears to be intact    The mid and lower cervical vertebral bodies show no evidence of an acute fracture. There is mild chronic-appearing compression of C5 and C6.    Underlying chronic degenerative changes are noted from C3 to C7 with spondylotic and arthritic changes. There is central and foraminal narrowing.    Lung apices are clear. No pneumothorax noted.    IMPRESSION:    Multilevel degenerative changes. No acute fracture identified.    --- End of Report ---            LUCIA CASTELLANO MD; Attending Radiologist  This document has been electronically signed. Jul 21 2021  3:19PM    < end of copied text >  < from: CT Head No Cont (07.21.21 @ 15:13) >    EXAM:  CT BRAIN                            PROCEDURE DATE:  07/21/2021          INTERPRETATION:  INDICATION:  Status post trauma with head injury.   Headache.  TECHNIQUE:  A non contrast 2.5mm axial CT study of the brain was performed from skull base to vertex. Coronal and sagittal reformations were generated from the axial data.  COMPARISON EXAMINATION:  no prior.    FINDINGS:    HEMISPHERES:There is generalized mild to moderate cerebral volume loss and atrophic change. No acute territorial infarct or hemorrhagic focus is noted. No mass lesion is identified.  VENTRICLES:  Midline with ex vacuo enlargement  POSTERIOR FOSSA:  The brain stem and cerebellum are unremarkable.  No CP angle lesion noted.  EXTRACEREBRAL SPACES:  No subdural or epidural collections are noted.  SKULL BASE AND CALVARIUM:  Appears intact.  No fracture or destructive lesion is identified.  SINUSES AND MASTOIDS:  Inflammatory changes are noted in the right maxillary sinus with near-complete opacification.  MISCELLANEOUS:  No orbital or suprasellar abnormality noted.    IMPRESSION:    1)  mild to moderate cerebral volume loss and involutional change. No CT evidence of an acute infarct, hemorrhage, or mass lesion..  2)  follow-up MR imaging may be considered for further assessment, if clinically warranted.      --- End of Report ---            LUCIA CASTELLANO MD; Attending Radiologist  This document has been electronically signed. Jul 21 2021  3:17PM    < end of copied text >    Consultant(s) Notes Reviewed:   YES      Plan of care was discussed with patient and /or primary care giver; all questions and concerns were addressed  NP Note discussed with  Primary Attending    INTERVAL HPI/OVERNIGHT EVENTS: seen at bedside, states feeling okay but c/o persistent weakness to lower legs.  denies any pain, GI issues or constipaton    MEDICATIONS  (STANDING):  cyanocobalamin 1000 MICROGram(s) Oral daily  dexAMETHasone  Injectable 6 milliGRAM(s) IV Push every 6 hours  enoxaparin Injectable 40 milliGRAM(s) SubCutaneous daily  folic acid 5 milliGRAM(s) Oral daily  furosemide    Tablet 20 milliGRAM(s) Oral daily  nystatin Powder 1 Application(s) Topical two times a day  pantoprazole    Tablet 40 milliGRAM(s) Oral before breakfast  propranolol 10 milliGRAM(s) Oral daily  sodium chloride 0.9%. 1000 milliLiter(s) (60 mL/Hr) IV Continuous <Continuous>  thiamine 100 milliGRAM(s) Oral daily    MEDICATIONS  (PRN):      __________________________________________________  REVIEW OF SYSTEMS:    CONSTITUTIONAL: No fever,   EYES: no acute visual disturbances  NECK: No pain or stiffness  RESPIRATORY: No cough; No shortness of breath  CARDIOVASCULAR: No chest pain, no palpitations  GASTROINTESTINAL: No pain. No nausea or vomiting; No diarrhea   NEUROLOGICAL: No headache or numbness, no tremors  MUSCULOSKELETAL: No joint pain, no muscle pain  GENITOURINARY: no dysuria, no frequency, no hesitancy  PSYCHIATRY: no depression , no anxiety  ALL OTHER  ROS negative        Vital Signs Last 24 Hrs  T(C): 36.5 (23 Jul 2021 05:33), Max: 36.6 (22 Jul 2021 13:40)  T(F): 97.7 (23 Jul 2021 05:33), Max: 97.8 (22 Jul 2021 13:40)  HR: 50 (23 Jul 2021 05:33) (50 - 61)  BP: 121/75 (23 Jul 2021 05:33) (121/75 - 145/78)  BP(mean): --  RR: 17 (23 Jul 2021 05:33) (16 - 18)  SpO2: 97% (23 Jul 2021 05:33) (97% - 100%)    ________________________________________________  PHYSICAL EXAM:  GENERAL: NAD, conversant  HEENT: Normocephalic;  conjunctivae and sclerae clear; moist mucous membranes;   NECK : supple  CHEST/LUNG: Clear to auscultation bilaterally with fair air entry   HEART: S1 S2  regular; no murmurs, gallops or rubs  ABDOMEN: Soft, Nontender, Nondistended; Bowel sounds present  EXTREMITIES: no cyanosis; trace leg edema ; no calf tenderness  SKIN: warm and dry; no rash  NERVOUS SYSTEM:  Awake and alert; Oriented  to place, person and time ; lower leg weakness     _________________________________________________  LABS:                        12.2   4.66  )-----------( 115      ( 23 Jul 2021 07:37 )             38.1     07-23    139  |  107  |  20<H>  ----------------------------<  84  3.9   |  26  |  1.03    Ca    8.5      23 Jul 2021 07:37  Phos  2.6     07-22  Mg     1.9     07-22    TPro  7.0  /  Alb  2.5<L>  /  TBili  0.9  /  DBili  x   /  AST  22  /  ALT  12  /  AlkPhos  150<H>  07-22        CAPILLARY BLOOD GLUCOSE            RADIOLOGY & ADDITIONAL TESTS:    Imaging  Reviewed:  YES    < from: MR Lumbar Spine No Cont (07.23.21 @ 16:16) >    EXAM:  MR SPINE LUMBAR                            PROCEDURE DATE:  07/23/2021          INTERPRETATION:  MRI lumbar spine without contrast    History lower extremity weakness    There is no compression deformity or subluxation or marrow infiltrationor edema. The conus is normal. There is no evidence of epidural mass or collection.    There is mild degenerative endplate change and far lateral osteophyte and ligamentous hypertrophy at L1-L2 and L2-L3 without spinal canal or foraminal stenosis    The L3-L4 disc space preserved. There is mild far lateral osteophyte, worse on the right. There is moderate bilateral facet arthropathy without spinal canal or significant foraminal stenosis    There is ventral endplate squaring and far lateral osteophyte at L4-5 with preservation of disc height and signal. There is moderate bilateral facet hypertrophy. Together this results in mild spinal canal and bilateral foraminal stenosis    There is severe degenerative disc change and bilateral facet arthropathy at L5-S1. Far lateral osteophyte further contributes to mild left-sided foraminal stenosis without central canal stenosis    IMPRESSION:  Spondylosis resulting in mild spinal stenosis at L4-5 as above. No acute findings.    --- End of Report ---            RADHA GILLIS MD; Attending Radiologist  This document has been electronically signed. Jul 23 2021  4:31PM    < end of copied text >    < from: MR Thoracic Spine No Cont (07.22.21 @ 13:12) >    EXAM:  MR SPINE THORACIC                            PROCEDURE DATE:  07/22/2021          INTERPRETATION:  MRI thoracic spine without contrast    History upper and lower extremity weakness    There is no thoracic compression deformity or subluxationor marrow infiltration or edema. There are flowing ventral syndesmophytes in the mid to lower thoracic levels without significant dorsal bulging or ridging or focal protrusion. The spinal cord is normal in signal and contour. There is no spinal stenosis or epidural mass or collection.    IMPRESSION:  Thoracic spondylosis with ventral DISH. Normal spinal canal contents.    --- End of Report ---            RADHA GILLIS MD; Attending Radiologist  This document has been electronically signed. Jul 22 2021  1:47PM    < end of copied text >  < from: MR Cervical Spine No Cont (07.22.21 @ 13:12) >    EXAM:  MR SPINE CERVICAL                            PROCEDURE DATE:  07/22/2021          INTERPRETATION:  MRI cervical spine without contrast  History progressive upper and lower extremity weakness  Comparison CT performed the prior day    There isno compression deformity or subluxation or marrow infiltration or edema. There is mild hypertrophic pannus dorsal to the odontoid without brainstem impingement    There is moderate degenerative loss of disc height at C2-3. There is a moderately largediffuse broad-based protrusion resulting in spinal stenosis with a residual AP spinal canal dimension of 2 mm and moderate spinal cord compression. There is mild increased T2 intrasubstance spinal cord signal consistent with myelomalacia or edema. Uncinate and facet hypertrophy contributes to moderate bilateral foraminal stenosis at this level    Severe degenerative disc change and mild broad dorsal osteophyte involves the other levels from C3-4 through C6-7, encroaching on and slightly displacing but not frankly compressing the spinal cord and resulting in mild spinal stenosis at those levels. Uncinate and facet hypertrophy results in multilevel foraminal stenosis, mild bilaterally at C3-4, severe on the left at C4-5, severe on the left and moderate on the right at C5-C6, and severe bilaterally at C6-7.    IMPRESSION:  Spinal stenosis and cord compression related to a broad-based disc protrusion of uncertain acuity at C2-3 as detailed above    Findings discussed with Samantha JONES    --- End of Report ---            RADHA GILLIS MD; Attending Radiologist  This document has been electronically signed. Jul 22 2021  1:48PM    < end of copied text >    < from: MR Thoracic Spine No Cont (07.22.21 @ 13:12) >    < from: MR Head No Cont (07.22.21 @ 13:11) >    EXAM:  MR BRAIN                            PROCEDURE DATE:  07/22/2021          INTERPRETATION:  MRI brain without contrast  Comparison CT performed the prior day  History lower extremity weakness    There is mild to moderate generalized volume loss without mass effect, cortical edema or hydrocephalus. There is no evidence of acute infarct. There is a single tiny focus of low gradient echo signal lateral to the right occipital horn consistent with a chronic microhemorrhage.. The orbital and sellar contents and cerebellar tonsils are within normal limits.    IMPRESSION:  No acute intracranial findings    --- End of Report ---            RADHA GILLIS MD; Attending Radiologist  This document has been electronically signed. Jul 22 2021  1:31PM    < end of copied text >  EXAM:  MR SPINE THORACIC                            PROCEDURE DATE:  07/22/2021          INTERPRETATION:  MRI thoracic spine without contrast    History upper and lower extremity weakness    There is no thoracic compression deformity or subluxationor marrow infiltration or edema. There are flowing ventral syndesmophytes in the mid to lower thoracic levels without significant dorsal bulging or ridging or focal protrusion. The spinal cord is normal in signal and contour. There is no spinal stenosis or epidural mass or collection.    IMPRESSION:  Thoracic spondylosis with ventral DISH. Normal spinal canal contents.    --- End of Report ---            RADHA GILLIS MD; Attending Radiologist  This document has been electronically signed. Jul 22 2021  1:47PM    < end of copied text >  < from: MR Cervical Spine No Cont (07.22.21 @ 13:12) >    EXAM:  MR SPINE CERVICAL                            PROCEDURE DATE:  07/22/2021          INTERPRETATION:  MRI cervical spine without contrast  History progressive upper and lower extremity weakness  Comparison CT performed the prior day    There isno compression deformity or subluxation or marrow infiltration or edema. There is mild hypertrophic pannus dorsal to the odontoid without brainstem impingement    There is moderate degenerative loss of disc height at C2-3. There is a moderately largediffuse broad-based protrusion resulting in spinal stenosis with a residual AP spinal canal dimension of 2 mm and moderate spinal cord compression. There is mild increased T2 intrasubstance spinal cord signal consistent with myelomalacia or edema. Uncinate and facet hypertrophy contributes to moderate bilateral foraminal stenosis at this level    Severe degenerative disc change and mild broad dorsal osteophyte involves the other levels from C3-4 through C6-7, encroaching on and slightly displacing but not frankly compressing the spinal cord and resulting in mild spinal stenosis at those levels. Uncinate and facet hypertrophy results in multilevel foraminal stenosis, mild bilaterally at C3-4, severe on the left at C4-5, severe on the left and moderate on the right at C5-C6, and severe bilaterally at C6-7.    IMPRESSION:  Spinal stenosis and cord compression related to a broad-based disc protrusion of uncertain acuity at C2-3 as detailed above    Findings discussed with Samantha JONES    --- End of Report ---            RADHA GILLIS MD; Attending Radiologist  This document has been electronically signed. Jul 22 2021  1:48PM    < end of copied text >  < from: MR Head No Cont (07.22.21 @ 13:11) >    EXAM:  MR BRAIN                            PROCEDURE DATE:  07/22/2021          INTERPRETATION:  MRI brain without contrast  Comparison CT performed the prior day  History lower extremity weakness    There is mild to moderate generalized volume loss without mass effect, cortical edema or hydrocephalus. There is no evidence of acute infarct. There is a single tiny focus of low gradient echo signal lateral to the right occipital horn consistent with a chronic microhemorrhage.. The orbital and sellar contents and cerebellar tonsils are within normal limits.    IMPRESSION:  No acute intracranial findings    --- End of Report ---            RADHA GILLIS MD; Attending Radiologist  This document has been electronically signed. Jul 22 2021  1:31PM    < end of copied text >      < from: CT Cervical Spine No Cont (07.21.21 @ 15:14) >    EXAM:  CT CERVICAL SPINE                            PROCEDURE DATE:  07/21/2021          INTERPRETATION:  CT CERVICAL    INDICATIONS:  neck pain. Trauma.    TECHNIQUE:  Thin section CT imaging was conducted.  3-D, Coronal and sagittal reformations were generated from the axial data.    FINDINGS:    There is alteration of the cervical lordosis which may reflect positioning or spasm.    C1/C2:  The anterior and posterior arches of C1 appear to be intact. There is no C1-C2 subluxation. No odontoidfracture is noted. Base of C2 appears to be intact    The mid and lower cervical vertebral bodies show no evidence of an acute fracture. There is mild chronic-appearing compression of C5 and C6.    Underlying chronic degenerative changes are noted from C3 to C7 with spondylotic and arthritic changes. There is central and foraminal narrowing.    Lung apices are clear. No pneumothorax noted.    IMPRESSION:    Multilevel degenerative changes. No acute fracture identified.    --- End of Report ---            LUCIA CASTELLANO MD; Attending Radiologist  This document has been electronically signed. Jul 21 2021  3:19PM    < end of copied text >  < from: CT Head No Cont (07.21.21 @ 15:13) >    EXAM:  CT BRAIN                            PROCEDURE DATE:  07/21/2021          INTERPRETATION:  INDICATION:  Status post trauma with head injury.   Headache.  TECHNIQUE:  A non contrast 2.5mm axial CT study of the brain was performed from skull base to vertex. Coronal and sagittal reformations were generated from the axial data.  COMPARISON EXAMINATION:  no prior.    FINDINGS:    HEMISPHERES:There is generalized mild to moderate cerebral volume loss and atrophic change. No acute territorial infarct or hemorrhagic focus is noted. No mass lesion is identified.  VENTRICLES:  Midline with ex vacuo enlargement  POSTERIOR FOSSA:  The brain stem and cerebellum are unremarkable.  No CP angle lesion noted.  EXTRACEREBRAL SPACES:  No subdural or epidural collections are noted.  SKULL BASE AND CALVARIUM:  Appears intact.  No fracture or destructive lesion is identified.  SINUSES AND MASTOIDS:  Inflammatory changes are noted in the right maxillary sinus with near-complete opacification.  MISCELLANEOUS:  No orbital or suprasellar abnormality noted.    IMPRESSION:    1)  mild to moderate cerebral volume loss and involutional change. No CT evidence of an acute infarct, hemorrhage, or mass lesion..  2)  follow-up MR imaging may be considered for further assessment, if clinically warranted.      --- End of Report ---            LUCIA CASTELLANO MD; Attending Radiologist  This document has been electronically signed. Jul 21 2021  3:17PM    < end of copied text >    Consultant(s) Notes Reviewed:   YES      Plan of care was discussed with patient and /or primary care giver; all questions and concerns were addressed

## 2021-07-23 NOTE — PROGRESS NOTE ADULT - PROBLEM SELECTOR PLAN 9
-pt will need cervical spine surgical intervention per dr. Knott, pending cardiology clearance, E[facility] ordered  -may transfer to [facility] next week  -PT consulted    plan of care discussed with attending physician.

## 2021-07-23 NOTE — PROGRESS NOTE ADULT - PROBLEM SELECTOR PLAN 1
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 No acute fx  -CT brain: mild to moderate cerebral volume loss and involutional change. No CT evidence of an acute infarct, hemorrhage, or mass lesion.  c/w thiamine, folic acid and vitamin B12  Follow MRI brain and C,T spine  Follow NIH and vital capacity Q6  Dr. Gallegos consulted  - Recommended 100 mg thymine, 5 mg Folic Acid, 1000 Micrograms of B12 -was sent by Dr. Cole neurologist to the hospital for worsening quadriparesis.   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 No acute fx  -CT brain: mild to moderate cerebral volume loss and involutional change. No CT evidence of an acute infarct, hemorrhage, or mass lesion.  -MRI TS result- Thoracic spondylosis with ventral DISH. Normal spinal canal contents.  -MRI cervical spine shows Spinal stenosis and cord compression related to a broad-based disc protrusion of uncertain acuity at C2-3 as detailed above  -MRI LS result Spondylosis resulting in mild spinal stenosis at L4-5 as above. No acute findings.  -Dr. Gallegos consulted  -ortho/spinal surgery dr. Knott following  -c/w 100 mg thymine, 5 mg Folic Acid, 1000 Micrograms of B12

## 2021-07-23 NOTE — PROGRESS NOTE ADULT - PROBLEM SELECTOR PLAN 2
Controlled  's  - continue home meds propanolol and lasix -p/w lower leg weakness  -CT cervical spine: Multilevel degenerative changes. No acute fx  -CT brain: mild to moderate cerebral volume loss and involutional change. No CT evidence of an acute infarct, hemorrhage, or mass lesion.  -MRI TS result- Thoracic spondylosis with ventral DISH. Normal spinal canal contents.  -MRI cervical spine shows Spinal stenosis and cord compression related to a broad-based disc protrusion of uncertain acuity at C2-3 as detailed above  -MRI LS result Spondylosis resulting in mild spinal stenosis at L4-5 as above. No acute findings.  -Dr. Gallegos consulted  -ortho/spinal surgery dr. Knott following  -offered cervical spine surgical intervention. Cardiology consulted for clearance. Echo ordered.   -started dexamethasone with sliding scales.   -Pt will need to transfer to [facility] for surgical intervention next week.

## 2021-07-23 NOTE — CONSULT NOTE ADULT - SUBJECTIVE AND OBJECTIVE BOX
Requesting Physician : Dr. Orta     Reason for Consultation: Abnormal ECG, preop risk assessment     HISTORY OF PRESENT ILLNESS:  81 year old male with history of liver cirrhosis, history of guillain-barre syndrome, who is being seen for abnormal ECG and preop risk assessment.  The patient was admitted with weakness and inability to walk wit his walker.  He saw his outpatient neurologist Dr. Cole who sent him to the ED for further workup.  The patient was found to have cervical spine stenosis and is now being worked up for possible cervical spine surgery.   The patient denies chest pain or anginal symptoms.  No sob, orthopnea or LE edema.         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)    Pancreatitis  2/2 choledocholithiasis s/p ERCP with stone extraction and plastic stent placement (11/2019, stent now removed)    Hematochezia  2/2 colonic AVMs s/p cauterization and hemorrhoids (11/2019)    Melena  2/2 duodenal ulcers s/p argon plasma coagulation (4/2020)    H/O inguinal hernia repair    History of repair of hip fracture    History of hip replacement  10/2020            MEDICATIONS:  MEDICATIONS  (STANDING):  cyanocobalamin 1000 MICROGram(s) Oral daily  dexAMETHasone  Injectable 6 milliGRAM(s) IV Push every 6 hours  enoxaparin Injectable 40 milliGRAM(s) SubCutaneous daily  folic acid 5 milliGRAM(s) Oral daily  furosemide    Tablet 20 milliGRAM(s) Oral daily  nystatin Powder 1 Application(s) Topical two times a day  pantoprazole    Tablet 40 milliGRAM(s) Oral before breakfast  propranolol 10 milliGRAM(s) Oral daily  sodium chloride 0.9%. 1000 milliLiter(s) (60 mL/Hr) IV Continuous <Continuous>  thiamine 100 milliGRAM(s) Oral daily      Allergies    No Known Allergies    Intolerances        FAMILY HISTORY:  Family history of ovarian cancer (Sibling)      Non-contributary for premature coronary disease or sudden cardiac death    SOCIAL HISTORY:    [x ] Non-smoker  [ ] Smoker  [ ] Alcohol      REVIEW OF SYSTEMS:  [ ]chest pain  [  ]shortness of breath  [  ]palpitations  [  ]syncope  [ ]near syncope [ ]upper extremity weakness   [ ] lower extremity weakness  [  ]diplopia  [  ]altered mental status   [  ]fevers  [ ]chills [ ]nausea  [ ]vomitting  [  ]dysphagia    [ ]abdominal pain  [ ]melena  [ ]BRBPR    [  ]epistaxis  [  ]rash    [ ]lower extremity edema        [x ] All others negative	  [ ] Unable to obtain    PHYSICAL EXAM:  T(C): 36.5 (07-23-21 @ 05:33), Max: 36.6 (07-22-21 @ 13:40)  HR: 50 (07-23-21 @ 05:33) (50 - 61)  BP: 121/75 (07-23-21 @ 05:33) (121/75 - 145/78)  RR: 17 (07-23-21 @ 05:33) (16 - 18)  SpO2: 97% (07-23-21 @ 05:33) (97% - 100%)  Wt(kg): --  I&O's Summary    22 Jul 2021 07:01  -  23 Jul 2021 07:00  --------------------------------------------------------  IN: 0 mL / OUT: 500 mL / NET: -500 mL          HEENT:   Normal oral mucosa, PERRL, EOMI	  Lymphatic: No lymphadenopathy , no edema  Cardiovascular: Normal S1 S2, No JVD, No murmurs , Peripheral pulses palpable 2+ bilaterally  Respiratory: Lungs clear to auscultation, normal effort 	  Gastrointestinal:  Soft, Non-tender, + BS	  Skin: No rashes, No ecchymoses, No cyanosis, warm to touch  Psychiatry:  Mood & affect appropriate      TELEMETRY: 	    ECG:  < from: 12 Lead ECG (07.21.21 @ 14:26) >  Sinus rhythm with short CO  Left axis deviation  Low voltage QRS  Incomplete right bundle branch block  Nonspecific ST and T wave abnormality  Borderline QT interval  Abnormal ECG    < end of copied text >  	  RADIOLOGY:  OTHER:     DIAGNOSTIC TESTING:  [ ] Echocardiogram: < from: Transthoracic Echocardiogram (02.13.21 @ 14:18) >  Normal left ventricular systolic function. No segmental  wall motion abnormalities.  Mild aortic stenosis.  No vegeetations seen.    < end of copied text >    [ ]  Catheterization:  [ ] Stress Test:    	  	  LABS:	 	    CARDIAC MARKERS:                              12.2   4.66  )-----------( 115      ( 23 Jul 2021 07:37 )             38.1     07-23    139  |  107  |  20<H>  ----------------------------<  84  3.9   |  26  |  1.03    Ca    8.5      23 Jul 2021 07:37  Phos  2.6     07-22  Mg     1.9     07-22    TPro  7.0  /  Alb  2.5<L>  /  TBili  0.9  /  DBili  x   /  AST  22  /  ALT  12  /  AlkPhos  150<H>  07-22    proBNP:   Lipid Profile:   HgA1c:   TSH:     ASSESSMENT/PLAN:   81 year old male with history of liver cirrhosis, history of guillain-barre syndrome, who is being seen for abnormal ECG and preop risk assessment.    -pt. with no chest pain or anginal symptoms  -no clinical heart failure on exam  -pt. with RBBB on ecg that was seen on prior ecg  -check TTE  -follow up ortho plans  -further workup pending above    Rome Dunbar MD

## 2021-07-24 LAB
ALBUMIN SERPL ELPH-MCNC: 2.6 G/DL — LOW (ref 3.5–5)
ALP SERPL-CCNC: 154 U/L — HIGH (ref 40–120)
ALT FLD-CCNC: 21 U/L DA — SIGNIFICANT CHANGE UP (ref 10–60)
ANION GAP SERPL CALC-SCNC: 7 MMOL/L — SIGNIFICANT CHANGE UP (ref 5–17)
AST SERPL-CCNC: 23 U/L — SIGNIFICANT CHANGE UP (ref 10–40)
BILIRUB SERPL-MCNC: 0.5 MG/DL — SIGNIFICANT CHANGE UP (ref 0.2–1.2)
BUN SERPL-MCNC: 22 MG/DL — HIGH (ref 7–18)
CALCIUM SERPL-MCNC: 9 MG/DL — SIGNIFICANT CHANGE UP (ref 8.4–10.5)
CHLORIDE SERPL-SCNC: 106 MMOL/L — SIGNIFICANT CHANGE UP (ref 96–108)
CO2 SERPL-SCNC: 25 MMOL/L — SIGNIFICANT CHANGE UP (ref 22–31)
COPPER SERPL-MCNC: 128 UG/DL — SIGNIFICANT CHANGE UP (ref 69–132)
CREAT SERPL-MCNC: 1.14 MG/DL — SIGNIFICANT CHANGE UP (ref 0.5–1.3)
GLUCOSE BLDC GLUCOMTR-MCNC: 115 MG/DL — HIGH (ref 70–99)
GLUCOSE BLDC GLUCOMTR-MCNC: 139 MG/DL — HIGH (ref 70–99)
GLUCOSE SERPL-MCNC: 163 MG/DL — HIGH (ref 70–99)
HCT VFR BLD CALC: 40.7 % — SIGNIFICANT CHANGE UP (ref 39–50)
HGB BLD-MCNC: 13.2 G/DL — SIGNIFICANT CHANGE UP (ref 13–17)
MCHC RBC-ENTMCNC: 26 PG — LOW (ref 27–34)
MCHC RBC-ENTMCNC: 32.4 GM/DL — SIGNIFICANT CHANGE UP (ref 32–36)
MCV RBC AUTO: 80.1 FL — SIGNIFICANT CHANGE UP (ref 80–100)
NRBC # BLD: 0 /100 WBCS — SIGNIFICANT CHANGE UP (ref 0–0)
PLATELET # BLD AUTO: 135 K/UL — LOW (ref 150–400)
POTASSIUM SERPL-MCNC: 4.3 MMOL/L — SIGNIFICANT CHANGE UP (ref 3.5–5.3)
POTASSIUM SERPL-SCNC: 4.3 MMOL/L — SIGNIFICANT CHANGE UP (ref 3.5–5.3)
PROT SERPL-MCNC: 7.1 G/DL — SIGNIFICANT CHANGE UP (ref 6–8.3)
RBC # BLD: 5.08 M/UL — SIGNIFICANT CHANGE UP (ref 4.2–5.8)
RBC # FLD: 15.9 % — HIGH (ref 10.3–14.5)
SODIUM SERPL-SCNC: 138 MMOL/L — SIGNIFICANT CHANGE UP (ref 135–145)
VIT B1 SERPL-MCNC: 98.9 NMOL/L — SIGNIFICANT CHANGE UP (ref 66.5–200)
WBC # BLD: 5 K/UL — SIGNIFICANT CHANGE UP (ref 3.8–10.5)
WBC # FLD AUTO: 5 K/UL — SIGNIFICANT CHANGE UP (ref 3.8–10.5)
ZINC SERPL-MCNC: 38 UG/DL — LOW (ref 44–115)

## 2021-07-24 PROCEDURE — 99233 SBSQ HOSP IP/OBS HIGH 50: CPT

## 2021-07-24 RX ADMIN — Medication 1: at 12:13

## 2021-07-24 RX ADMIN — NYSTATIN CREAM 1 APPLICATION(S): 100000 CREAM TOPICAL at 05:51

## 2021-07-24 RX ADMIN — PANTOPRAZOLE SODIUM 40 MILLIGRAM(S): 20 TABLET, DELAYED RELEASE ORAL at 06:33

## 2021-07-24 RX ADMIN — Medication 6 MILLIGRAM(S): at 12:16

## 2021-07-24 RX ADMIN — Medication 20 MILLIGRAM(S): at 05:49

## 2021-07-24 RX ADMIN — Medication 5 MILLIGRAM(S): at 12:02

## 2021-07-24 RX ADMIN — PREGABALIN 1000 MICROGRAM(S): 225 CAPSULE ORAL at 12:02

## 2021-07-24 RX ADMIN — SENNA PLUS 2 TABLET(S): 8.6 TABLET ORAL at 22:05

## 2021-07-24 RX ADMIN — Medication 5 MILLIGRAM(S): at 14:09

## 2021-07-24 RX ADMIN — Medication 6 MILLIGRAM(S): at 17:28

## 2021-07-24 RX ADMIN — POLYETHYLENE GLYCOL 3350 17 GRAM(S): 17 POWDER, FOR SOLUTION ORAL at 12:02

## 2021-07-24 RX ADMIN — NYSTATIN CREAM 1 APPLICATION(S): 100000 CREAM TOPICAL at 17:43

## 2021-07-24 RX ADMIN — Medication 6 MILLIGRAM(S): at 00:20

## 2021-07-24 RX ADMIN — ENOXAPARIN SODIUM 40 MILLIGRAM(S): 100 INJECTION SUBCUTANEOUS at 12:02

## 2021-07-24 RX ADMIN — Medication 6 MILLIGRAM(S): at 05:49

## 2021-07-24 RX ADMIN — Medication 100 MILLIGRAM(S): at 12:02

## 2021-07-24 NOTE — PROGRESS NOTE ADULT - ASSESSMENT
81 year old M with hx of Guillain [place] in 1996, alcoholic cirrhosis, was sent by Dr. Cole neurologist for worsening quadriparesis. On MRI C spine found to have severe spinal stenosis with cord compression. No urinary incontinence or saddle anesthesia. Has been having worsening weakness over two months with BLE numbness.     -Upper and lower extremity weakness: secondary to cervical spine compression.   Progressive symptoms since April. CT C spine with  Spinal stenosis and cord compression related to a broad-based disc protrusion of uncertain acuity at C2-3    Pt is in agreement for surgery. Cardio and GI for clearance as requested by ortho-spine Dr Knott. Once cleared, pt to be transferred to [facility].     -Cardio clearance: awaiting echo. Pt with RBBB from prior EKG.     -Cervical spine cord compression: On decadron.   Neurological status stable. Pt for surgical intervention once cleared. Likely Monday     -Multilevel degenerative changes.   CT brain: mild to moderate cerebral volume loss and involutional change. No CT evidence of an acute infarct, hemorrhage, or mass lesion.  -MRI TS result- Thoracic spondylosis with ventral DISH. Normal spinal canal contents.  -MRI cervical spine shows Spinal stenosis and cord compression related to a broad-based disc protrusion of uncertain acuity at C2-3 as detailed above  -MRI LS result Spondylosis resulting in mild spinal stenosis at L4-5 as above. No acute findings.    ·  Problem: HTN (hypertension). Plan: Controlled  -'s  - continue home meds propanolol and lasix.    ·  Problem: Acute kidney injury. Plan: -Resolved   -likely prerenal   -Scr WNL  -Monitor BMP daily  -avoid nephrotoxic meds.    Problem: Cirrhosis 2/2 alcohol. Hx of Variceal bleed in the past.  GI consulted for clearance for surgery. Pt follows with a hepatologist   As per previous charts: 04/2020:  Decompensated Alcoholic Cirrhosis (now sober, off transplant list [facility] as improved, w/ Small Grade 1 Esophogeal Varices (EGD 9/19) w/ hx Esophageal Bleeding s/p banding 2018 on Propanolol, hx of Hepatic Encephalopathy on Lactulose, Negative HCC w/u as of 4/19, no ascites of 10/19), Porcelain Gallbladder, known R Rectus Sheath Mass, Liver lesion and R Renal pole lesions of unclear etiology, SMA stenosis and recent hospitalizations (11/2019) for severe RUQ pain 2/2 pancreatitis from cholelithiasis/choledocholithiasis. s/p ERCP with stone extraction and f/u hospitalization with plastic stent placement w/o spontaneous dissolution and hematochezia 2/2 hemorrhoids, colonic AVMs (s/p cauterization), and on 4/2/2020 for another GI bleed.     -GBS (1996, hospitalized [id number] w/ paralysis),    ·  Problem: DVT prophylaxis.  Plan: IMPROVE VTE Individual Risk Assessment  RISK                                                                Points  [  ] Previous VTE                                                  3  [  ] Thrombophilia                                               2  [  ] Lower limb paralysis                                      2        (unable to hold up >15 seconds)    [  ] Current Cancer                                              2         (within 6 months)  [x  ] Immobilization > 24 hrs                                1  [  ] ICU/CCU stay > 24 hours                              1  [x  ] Age > 60                                                      1  IMPROVE VTE Score _________2, -- for DVT proph    Lovenox on past admission for DVT ppx.     ·  Problem: Goals of care, counseling/discussion.  Plan: -Full code  -pt and wife Shannon agreeable for surgical intervention. aware of possible transfer to [facility] next week.

## 2021-07-24 NOTE — PROGRESS NOTE ADULT - SUBJECTIVE AND OBJECTIVE BOX
pt seen and examined, no complaints, ROS - .     cyanocobalamin 1000 MICROGram(s) Oral daily  dexAMETHasone  Injectable 6 milliGRAM(s) IV Push every 6 hours  dextrose 40% Gel 15 Gram(s) Oral once  dextrose 5%. 1000 milliLiter(s) IV Continuous <Continuous>  dextrose 5%. 1000 milliLiter(s) IV Continuous <Continuous>  dextrose 50% Injectable 25 Gram(s) IV Push once  dextrose 50% Injectable 12.5 Gram(s) IV Push once  dextrose 50% Injectable 25 Gram(s) IV Push once  enoxaparin Injectable 40 milliGRAM(s) SubCutaneous daily  folic acid 5 milliGRAM(s) Oral daily  furosemide    Tablet 20 milliGRAM(s) Oral daily  glucagon  Injectable 1 milliGRAM(s) IntraMuscular once  insulin lispro (ADMELOG) corrective regimen sliding scale   SubCutaneous three times a day before meals  nystatin Powder 1 Application(s) Topical two times a day  pantoprazole    Tablet 40 milliGRAM(s) Oral before breakfast  polyethylene glycol 3350 17 Gram(s) Oral daily  propranolol 10 milliGRAM(s) Oral daily  senna 2 Tablet(s) Oral at bedtime  sodium chloride 0.9%. 1000 milliLiter(s) IV Continuous <Continuous>  thiamine 100 milliGRAM(s) Oral daily                            13.2   5.00  )-----------( 135      ( 24 Jul 2021 05:55 )             40.7       Hemoglobin: 13.2 g/dL (07-24 @ 05:55)  Hemoglobin: 12.2 g/dL (07-23 @ 07:37)  Hemoglobin: 13.0 g/dL (07-22 @ 07:38)  Hemoglobin: 14.2 g/dL (07-21 @ 15:33)      07-23    139  |  107  |  20<H>  ----------------------------<  84  3.9   |  26  |  1.03    Ca    8.5      23 Jul 2021 07:37  Phos  2.6     07-22  Mg     1.9     07-22    TPro  7.0  /  Alb  2.5<L>  /  TBili  0.9  /  DBili  x   /  AST  22  /  ALT  12  /  AlkPhos  150<H>  07-22    Creatinine Trend: 1.03<--, 1.15<--, 1.52<--    COAGS:           T(C): 36.4 (07-24-21 @ 05:00), Max: 36.6 (07-23-21 @ 21:03)  HR: 79 (07-24-21 @ 05:00) (50 - 92)  BP: 118/84 (07-24-21 @ 05:00) (100/60 - 120/72)  RR: 16 (07-24-21 @ 05:00) (16 - 20)  SpO2: 100% (07-24-21 @ 05:00) (95% - 100%)  Wt(kg): --    I&O's Summary    22 Jul 2021 07:01  -  23 Jul 2021 07:00  --------------------------------------------------------  IN: 0 mL / OUT: 500 mL / NET: -500 mL    23 Jul 2021 07:01  -  24 Jul 2021 06:11  --------------------------------------------------------  IN: 0 mL / OUT: 200 mL / NET: -200 mL      HEENT:   Normal oral mucosa, PERRL, EOMI	  Lymphatic: No lymphadenopathy , no edema  Cardiovascular: Normal S1 S2, No JVD, No murmurs , Peripheral pulses palpable 2+ bilaterally  Respiratory: Lungs clear to auscultation, normal effort 	  Gastrointestinal:  Soft, Non-tender, + BS	  Skin: No rashes, No ecchymoses, No cyanosis, warm to touch  Psychiatry:  Mood & affect appropriate      TELEMETRY: 	     	  RADIOLOGY:  OTHER:     DIAGNOSTIC TESTING:  [ ] Echocardiogram: < from: Transthoracic Echocardiogram (02.13.21 @ 14:18) >  Normal left ventricular systolic function. No segmental  wall motion abnormalities.  Mild aortic stenosis.  No vegeetations seen.    < end of copied text >    [ ]  Catheterization:  ASSESSMENT/PLAN:   81 year old male with history of liver cirrhosis, history of guillain-barre syndrome, who is being seen for abnormal ECG and preop risk assessment.    - tolerating propranolol   - keep net net with po lasix   - steroid taper per med   -pt. with no chest pain or anginal symptoms  -no clinical heart failure on exam  -pt. with RBBB on ecg that was seen on prior ecg  -check TTE

## 2021-07-24 NOTE — PROGRESS NOTE ADULT - ASSESSMENT
CARDIOLOGY ATTENDING    Agree with above. Echo unremarkable. No further inpatient cardiac workup expected.

## 2021-07-24 NOTE — PROGRESS NOTE ADULT - SUBJECTIVE AND OBJECTIVE BOX
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 After the episode pt was able to ambulate using a cane and eventually regained normal strength. In october pt started experiencing tingling in the toes which eventually caused him to fall leading to Right sided hip fracture which was surgically repaired. Pt continued rehab after surgery and was able to ambulate using walker. In february 2021 pt received the 2nd dose of Pfizer vaccine and the tingling sensation in his toes started to get worse.  For the last 6 weeks pt has been bed bound as he has little to no strength in his lower extremities. Pt went to the clinic of Dr. Clarke where he was examined and told to come to the ED. He was brought in by his daughter and wife who were contacted to get most of the history as pt wasn't too clear on the timeline.    (21 Jul 2021 17:05)    Pt states there is 10-20% improvement in his strength in his arms and legs. Pt is voiding freely. No bowel incontinence.      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 1000 milliLiter(s) (50 mL/Hr) IV Continuous <Continuous>  dextrose 5%. 1000 milliLiter(s) (100 mL/Hr) IV Continuous <Continuous>  dextrose 50% Injectable 25 Gram(s) IV Push once  dextrose 50% Injectable 12.5 Gram(s) IV Push once  dextrose 50% Injectable 25 Gram(s) IV Push once  enoxaparin Injectable 40 milliGRAM(s) SubCutaneous daily  folic acid 5 milliGRAM(s) Oral daily  furosemide    Tablet 20 milliGRAM(s) Oral daily  glucagon  Injectable 1 milliGRAM(s) IntraMuscular once  insulin lispro (ADMELOG) corrective regimen sliding scale   SubCutaneous three times a day before meals  nystatin Powder 1 Application(s) Topical two times a day  pantoprazole    Tablet 40 milliGRAM(s) Oral before breakfast  polyethylene glycol 3350 17 Gram(s) Oral daily  propranolol 10 milliGRAM(s) Oral daily  senna 2 Tablet(s) Oral at bedtime  sodium chloride 0.9%. 1000 milliLiter(s) (60 mL/Hr) IV Continuous <Continuous>  thiamine 100 milliGRAM(s) Oral daily    MEDICATIONS  (PRN):      EXAM:  Vital Signs Last 24 Hrs  T(C): 36.6 (24 Jul 2021 14:07), Max: 36.6 (23 Jul 2021 21:03)  T(F): 97.8 (24 Jul 2021 14:07), Max: 97.9 (23 Jul 2021 21:03)  HR: 60 (24 Jul 2021 14:07) (60 - 92)  BP: 108/64 (24 Jul 2021 14:07) (100/60 - 118/84)  BP(mean): --  RR: 20 (24 Jul 2021 14:07) (16 - 20)  SpO2: 100% (24 Jul 2021 14:07) (95% - 100%)    07-23 @ 07:01  -  07-24 @ 07:00  --------------------------------------------------------  IN: 0 mL / OUT: 200 mL / NET: -200 mL        PHYSICAL EXAM:  Constitutional: awake  Neck: supple  Respiratory: bilaterally clear to auscultation, no wheezing, no rhonchi, no crackles, no decreased air entry  Cardiovascular: s1s2, rrr  Gastrointestinal: soft, non tender  Extremities: no edema.   Neurological: alert and oriented  Skin: intact no rash, warm to touch.   Musculoskeletal: moves all 4 extremities. 4/5 in upper and lower extremities   Psychiatric: appropriate affect.     LABS:  LIVER FUNCTIONS - ( 24 Jul 2021 05:55 )  Alb: 2.6 g/dL / Pro: 7.1 g/dL / ALK PHOS: 154 U/L / ALT: 21 U/L DA / AST: 23 U/L / GGT: x                               13.2   5.00  )-----------( 135      ( 24 Jul 2021 05:55 )             40.7     07-24    138  |  106  |  22<H>  ----------------------------<  163<H>  4.3   |  25  |  1.14    Ca    9.0      24 Jul 2021 05:55    TPro  7.1  /  Alb  2.6<L>  /  TBili  0.5  /  DBili  x   /  AST  23  /  ALT  21  /  AlkPhos  154<H>  07-24

## 2021-07-25 ENCOUNTER — TRANSCRIPTION ENCOUNTER (OUTPATIENT)
Age: 81
End: 2021-07-25

## 2021-07-25 ENCOUNTER — INPATIENT (INPATIENT)
Facility: HOSPITAL | Age: 81
LOS: 8 days | Discharge: NURSING, REHAB OR LONG-TERM CARE | DRG: 471 | End: 2021-08-03
Attending: NEUROLOGICAL SURGERY | Admitting: NEUROLOGICAL SURGERY
Payer: COMMERCIAL

## 2021-07-25 VITALS
HEART RATE: 58 BPM | SYSTOLIC BLOOD PRESSURE: 131 MMHG | TEMPERATURE: 98 F | OXYGEN SATURATION: 98 % | DIASTOLIC BLOOD PRESSURE: 75 MMHG | RESPIRATION RATE: 16 BRPM | WEIGHT: 160.5 LBS | HEIGHT: 60.8 IN

## 2021-07-25 VITALS
TEMPERATURE: 98 F | OXYGEN SATURATION: 99 % | HEART RATE: 67 BPM | RESPIRATION RATE: 16 BRPM | SYSTOLIC BLOOD PRESSURE: 112 MMHG | DIASTOLIC BLOOD PRESSURE: 60 MMHG

## 2021-07-25 DIAGNOSIS — Z96.649 PRESENCE OF UNSPECIFIED ARTIFICIAL HIP JOINT: Chronic | ICD-10-CM

## 2021-07-25 DIAGNOSIS — Z98.890 OTHER SPECIFIED POSTPROCEDURAL STATES: Chronic | ICD-10-CM

## 2021-07-25 DIAGNOSIS — G95.20 UNSPECIFIED CORD COMPRESSION: ICD-10-CM

## 2021-07-25 LAB
ALBUMIN SERPL ELPH-MCNC: 2.6 G/DL — LOW (ref 3.5–5)
ALBUMIN SERPL ELPH-MCNC: 3.3 G/DL — SIGNIFICANT CHANGE UP (ref 3.3–5)
ALP SERPL-CCNC: 137 U/L — HIGH (ref 40–120)
ALP SERPL-CCNC: 140 U/L — HIGH (ref 40–120)
ALT FLD-CCNC: 15 U/L — SIGNIFICANT CHANGE UP (ref 10–45)
ALT FLD-CCNC: 17 U/L DA — SIGNIFICANT CHANGE UP (ref 10–60)
ANION GAP SERPL CALC-SCNC: 12 MMOL/L — SIGNIFICANT CHANGE UP (ref 5–17)
ANION GAP SERPL CALC-SCNC: 9 MMOL/L — SIGNIFICANT CHANGE UP (ref 5–17)
APTT BLD: 34 SEC — SIGNIFICANT CHANGE UP (ref 27.5–35.5)
AST SERPL-CCNC: 19 U/L — SIGNIFICANT CHANGE UP (ref 10–40)
AST SERPL-CCNC: 23 U/L — SIGNIFICANT CHANGE UP (ref 10–40)
BASOPHILS # BLD AUTO: 0 K/UL — SIGNIFICANT CHANGE UP (ref 0–0.2)
BASOPHILS NFR BLD AUTO: 0 % — SIGNIFICANT CHANGE UP (ref 0–2)
BILIRUB SERPL-MCNC: 0.3 MG/DL — SIGNIFICANT CHANGE UP (ref 0.2–1.2)
BILIRUB SERPL-MCNC: 0.4 MG/DL — SIGNIFICANT CHANGE UP (ref 0.2–1.2)
BUN SERPL-MCNC: 31 MG/DL — HIGH (ref 7–18)
BUN SERPL-MCNC: 31 MG/DL — HIGH (ref 7–23)
CALCIUM SERPL-MCNC: 8.7 MG/DL — SIGNIFICANT CHANGE UP (ref 8.4–10.5)
CALCIUM SERPL-MCNC: 9.4 MG/DL — SIGNIFICANT CHANGE UP (ref 8.4–10.5)
CHLORIDE SERPL-SCNC: 102 MMOL/L — SIGNIFICANT CHANGE UP (ref 96–108)
CHLORIDE SERPL-SCNC: 105 MMOL/L — SIGNIFICANT CHANGE UP (ref 96–108)
CO2 SERPL-SCNC: 23 MMOL/L — SIGNIFICANT CHANGE UP (ref 22–31)
CO2 SERPL-SCNC: 25 MMOL/L — SIGNIFICANT CHANGE UP (ref 22–31)
CREAT SERPL-MCNC: 1.03 MG/DL — SIGNIFICANT CHANGE UP (ref 0.5–1.3)
CREAT SERPL-MCNC: 1.2 MG/DL — SIGNIFICANT CHANGE UP (ref 0.5–1.3)
EOSINOPHIL # BLD AUTO: 0 K/UL — SIGNIFICANT CHANGE UP (ref 0–0.5)
EOSINOPHIL NFR BLD AUTO: 0 % — SIGNIFICANT CHANGE UP (ref 0–6)
GLUCOSE BLDC GLUCOMTR-MCNC: 105 MG/DL — HIGH (ref 70–99)
GLUCOSE BLDC GLUCOMTR-MCNC: 109 MG/DL — HIGH (ref 70–99)
GLUCOSE BLDC GLUCOMTR-MCNC: 169 MG/DL — HIGH (ref 70–99)
GLUCOSE BLDC GLUCOMTR-MCNC: 233 MG/DL — HIGH (ref 70–99)
GLUCOSE BLDC GLUCOMTR-MCNC: 236 MG/DL — HIGH (ref 70–99)
GLUCOSE BLDC GLUCOMTR-MCNC: 76 MG/DL — SIGNIFICANT CHANGE UP (ref 70–99)
GLUCOSE SERPL-MCNC: 125 MG/DL — HIGH (ref 70–99)
GLUCOSE SERPL-MCNC: 178 MG/DL — HIGH (ref 70–99)
HCT VFR BLD CALC: 37.9 % — LOW (ref 39–50)
HCT VFR BLD CALC: 40.5 % — SIGNIFICANT CHANGE UP (ref 39–50)
HGB BLD-MCNC: 12.5 G/DL — LOW (ref 13–17)
HGB BLD-MCNC: 13.1 G/DL — SIGNIFICANT CHANGE UP (ref 13–17)
IMM GRANULOCYTES NFR BLD AUTO: 0.8 % — SIGNIFICANT CHANGE UP (ref 0–1.5)
INR BLD: 1.47 RATIO — HIGH (ref 0.88–1.16)
LYMPHOCYTES # BLD AUTO: 0.56 K/UL — LOW (ref 1–3.3)
LYMPHOCYTES # BLD AUTO: 6.3 % — LOW (ref 13–44)
MCHC RBC-ENTMCNC: 25.9 PG — LOW (ref 27–34)
MCHC RBC-ENTMCNC: 26.3 PG — LOW (ref 27–34)
MCHC RBC-ENTMCNC: 32.3 GM/DL — SIGNIFICANT CHANGE UP (ref 32–36)
MCHC RBC-ENTMCNC: 33 GM/DL — SIGNIFICANT CHANGE UP (ref 32–36)
MCV RBC AUTO: 79.6 FL — LOW (ref 80–100)
MCV RBC AUTO: 80.2 FL — SIGNIFICANT CHANGE UP (ref 80–100)
MONOCYTES # BLD AUTO: 0.48 K/UL — SIGNIFICANT CHANGE UP (ref 0–0.9)
MONOCYTES NFR BLD AUTO: 5.4 % — SIGNIFICANT CHANGE UP (ref 2–14)
NEUTROPHILS # BLD AUTO: 7.84 K/UL — HIGH (ref 1.8–7.4)
NEUTROPHILS NFR BLD AUTO: 87.5 % — HIGH (ref 43–77)
NRBC # BLD: 0 /100 WBCS — SIGNIFICANT CHANGE UP (ref 0–0)
NRBC # BLD: 0 /100 WBCS — SIGNIFICANT CHANGE UP (ref 0–0)
PLATELET # BLD AUTO: 146 K/UL — LOW (ref 150–400)
PLATELET # BLD AUTO: 171 K/UL — SIGNIFICANT CHANGE UP (ref 150–400)
POTASSIUM SERPL-MCNC: 4.3 MMOL/L — SIGNIFICANT CHANGE UP (ref 3.5–5.3)
POTASSIUM SERPL-MCNC: 4.4 MMOL/L — SIGNIFICANT CHANGE UP (ref 3.5–5.3)
POTASSIUM SERPL-SCNC: 4.3 MMOL/L — SIGNIFICANT CHANGE UP (ref 3.5–5.3)
POTASSIUM SERPL-SCNC: 4.4 MMOL/L — SIGNIFICANT CHANGE UP (ref 3.5–5.3)
PROT SERPL-MCNC: 6.8 G/DL — SIGNIFICANT CHANGE UP (ref 6–8.3)
PROT SERPL-MCNC: 6.8 G/DL — SIGNIFICANT CHANGE UP (ref 6–8.3)
PROTHROM AB SERPL-ACNC: 17.3 SEC — HIGH (ref 10.6–13.6)
RBC # BLD: 4.76 M/UL — SIGNIFICANT CHANGE UP (ref 4.2–5.8)
RBC # BLD: 5.05 M/UL — SIGNIFICANT CHANGE UP (ref 4.2–5.8)
RBC # FLD: 15.9 % — HIGH (ref 10.3–14.5)
RBC # FLD: 16 % — HIGH (ref 10.3–14.5)
SODIUM SERPL-SCNC: 137 MMOL/L — SIGNIFICANT CHANGE UP (ref 135–145)
SODIUM SERPL-SCNC: 139 MMOL/L — SIGNIFICANT CHANGE UP (ref 135–145)
WBC # BLD: 10.3 K/UL — SIGNIFICANT CHANGE UP (ref 3.8–10.5)
WBC # BLD: 8.95 K/UL — SIGNIFICANT CHANGE UP (ref 3.8–10.5)
WBC # FLD AUTO: 10.3 K/UL — SIGNIFICANT CHANGE UP (ref 3.8–10.5)
WBC # FLD AUTO: 8.95 K/UL — SIGNIFICANT CHANGE UP (ref 3.8–10.5)

## 2021-07-25 PROCEDURE — 85025 COMPLETE CBC W/AUTO DIFF WBC: CPT

## 2021-07-25 PROCEDURE — 86431 RHEUMATOID FACTOR QUANT: CPT

## 2021-07-25 PROCEDURE — 83930 ASSAY OF BLOOD OSMOLALITY: CPT

## 2021-07-25 PROCEDURE — 70551 MRI BRAIN STEM W/O DYE: CPT

## 2021-07-25 PROCEDURE — 86200 CCP ANTIBODY: CPT

## 2021-07-25 PROCEDURE — 83921 ORGANIC ACID SINGLE QUANT: CPT

## 2021-07-25 PROCEDURE — 83090 ASSAY OF HOMOCYSTEINE: CPT

## 2021-07-25 PROCEDURE — 87635 SARS-COV-2 COVID-19 AMP PRB: CPT

## 2021-07-25 PROCEDURE — 22614 ARTHRD PST TQ 1NTRSPC EA ADD: CPT

## 2021-07-25 PROCEDURE — 84207 ASSAY OF VITAMIN B-6: CPT

## 2021-07-25 PROCEDURE — 36415 COLL VENOUS BLD VENIPUNCTURE: CPT

## 2021-07-25 PROCEDURE — 72146 MRI CHEST SPINE W/O DYE: CPT

## 2021-07-25 PROCEDURE — 84300 ASSAY OF URINE SODIUM: CPT

## 2021-07-25 PROCEDURE — 80048 BASIC METABOLIC PNL TOTAL CA: CPT

## 2021-07-25 PROCEDURE — 99238 HOSP IP/OBS DSCHRG MGMT 30/<: CPT

## 2021-07-25 PROCEDURE — 83036 HEMOGLOBIN GLYCOSYLATED A1C: CPT

## 2021-07-25 PROCEDURE — 82607 VITAMIN B-12: CPT

## 2021-07-25 PROCEDURE — 82525 ASSAY OF COPPER: CPT

## 2021-07-25 PROCEDURE — 82570 ASSAY OF URINE CREATININE: CPT

## 2021-07-25 PROCEDURE — 72125 CT NECK SPINE W/O DYE: CPT

## 2021-07-25 PROCEDURE — 86769 SARS-COV-2 COVID-19 ANTIBODY: CPT

## 2021-07-25 PROCEDURE — 80074 ACUTE HEPATITIS PANEL: CPT

## 2021-07-25 PROCEDURE — 22842 INSERT SPINE FIXATION DEVICE: CPT

## 2021-07-25 PROCEDURE — 84100 ASSAY OF PHOSPHORUS: CPT

## 2021-07-25 PROCEDURE — 70450 CT HEAD/BRAIN W/O DYE: CPT

## 2021-07-25 PROCEDURE — 93306 TTE W/DOPPLER COMPLETE: CPT

## 2021-07-25 PROCEDURE — 84630 ASSAY OF ZINC: CPT

## 2021-07-25 PROCEDURE — 82746 ASSAY OF FOLIC ACID SERUM: CPT

## 2021-07-25 PROCEDURE — 72141 MRI NECK SPINE W/O DYE: CPT

## 2021-07-25 PROCEDURE — 93005 ELECTROCARDIOGRAM TRACING: CPT

## 2021-07-25 PROCEDURE — 83735 ASSAY OF MAGNESIUM: CPT

## 2021-07-25 PROCEDURE — 87389 HIV-1 AG W/HIV-1&-2 AB AG IA: CPT

## 2021-07-25 PROCEDURE — 80053 COMPREHEN METABOLIC PANEL: CPT

## 2021-07-25 PROCEDURE — 85652 RBC SED RATE AUTOMATED: CPT

## 2021-07-25 PROCEDURE — 86780 TREPONEMA PALLIDUM: CPT

## 2021-07-25 PROCEDURE — 94150 VITAL CAPACITY TEST: CPT

## 2021-07-25 PROCEDURE — 85027 COMPLETE CBC AUTOMATED: CPT

## 2021-07-25 PROCEDURE — 63015 REMOVE SPINE LAMINA >2 CRVCL: CPT

## 2021-07-25 PROCEDURE — 72148 MRI LUMBAR SPINE W/O DYE: CPT

## 2021-07-25 PROCEDURE — 84425 ASSAY OF VITAMIN B-1: CPT

## 2021-07-25 PROCEDURE — 86140 C-REACTIVE PROTEIN: CPT

## 2021-07-25 PROCEDURE — 99285 EMERGENCY DEPT VISIT HI MDM: CPT

## 2021-07-25 PROCEDURE — 22600 ARTHRD PST TQ 1NTRSPC CRV: CPT

## 2021-07-25 PROCEDURE — 82962 GLUCOSE BLOOD TEST: CPT

## 2021-07-25 RX ORDER — THIAMINE MONONITRATE (VIT B1) 100 MG
100 TABLET ORAL DAILY
Refills: 0 | Status: DISCONTINUED | OUTPATIENT
Start: 2021-07-25 | End: 2021-08-03

## 2021-07-25 RX ORDER — INSULIN LISPRO 100/ML
VIAL (ML) SUBCUTANEOUS AT BEDTIME
Refills: 0 | Status: DISCONTINUED | OUTPATIENT
Start: 2021-07-25 | End: 2021-07-26

## 2021-07-25 RX ORDER — SODIUM CHLORIDE 9 MG/ML
1000 INJECTION, SOLUTION INTRAVENOUS
Refills: 0 | Status: DISCONTINUED | OUTPATIENT
Start: 2021-07-25 | End: 2021-07-26

## 2021-07-25 RX ORDER — DEXTROSE 50 % IN WATER 50 %
12.5 SYRINGE (ML) INTRAVENOUS ONCE
Refills: 0 | Status: DISCONTINUED | OUTPATIENT
Start: 2021-07-25 | End: 2021-07-31

## 2021-07-25 RX ORDER — POLYETHYLENE GLYCOL 3350 17 G/17G
17 POWDER, FOR SOLUTION ORAL DAILY
Refills: 0 | Status: DISCONTINUED | OUTPATIENT
Start: 2021-07-25 | End: 2021-07-28

## 2021-07-25 RX ORDER — PANTOPRAZOLE SODIUM 20 MG/1
40 TABLET, DELAYED RELEASE ORAL
Refills: 0 | Status: DISCONTINUED | OUTPATIENT
Start: 2021-07-25 | End: 2021-08-03

## 2021-07-25 RX ORDER — DEXTROSE 50 % IN WATER 50 %
15 SYRINGE (ML) INTRAVENOUS ONCE
Refills: 0 | Status: DISCONTINUED | OUTPATIENT
Start: 2021-07-25 | End: 2021-07-26

## 2021-07-25 RX ORDER — DEXTROSE 50 % IN WATER 50 %
25 SYRINGE (ML) INTRAVENOUS ONCE
Refills: 0 | Status: DISCONTINUED | OUTPATIENT
Start: 2021-07-25 | End: 2021-07-31

## 2021-07-25 RX ORDER — INSULIN LISPRO 100/ML
VIAL (ML) SUBCUTANEOUS EVERY 6 HOURS
Refills: 0 | Status: DISCONTINUED | OUTPATIENT
Start: 2021-07-25 | End: 2021-07-26

## 2021-07-25 RX ORDER — FUROSEMIDE 40 MG
20 TABLET ORAL DAILY
Refills: 0 | Status: DISCONTINUED | OUTPATIENT
Start: 2021-07-25 | End: 2021-08-03

## 2021-07-25 RX ORDER — GLUCAGON INJECTION, SOLUTION 0.5 MG/.1ML
1 INJECTION, SOLUTION SUBCUTANEOUS ONCE
Refills: 0 | Status: DISCONTINUED | OUTPATIENT
Start: 2021-07-25 | End: 2021-08-03

## 2021-07-25 RX ORDER — SENNA PLUS 8.6 MG/1
2 TABLET ORAL AT BEDTIME
Refills: 0 | Status: DISCONTINUED | OUTPATIENT
Start: 2021-07-25 | End: 2021-08-03

## 2021-07-25 RX ORDER — DEXAMETHASONE 0.5 MG/5ML
6 ELIXIR ORAL EVERY 6 HOURS
Refills: 0 | Status: DISCONTINUED | OUTPATIENT
Start: 2021-07-25 | End: 2021-07-27

## 2021-07-25 RX ORDER — SODIUM CHLORIDE 9 MG/ML
1000 INJECTION INTRAMUSCULAR; INTRAVENOUS; SUBCUTANEOUS
Refills: 0 | Status: DISCONTINUED | OUTPATIENT
Start: 2021-07-25 | End: 2021-07-26

## 2021-07-25 RX ORDER — PREGABALIN 225 MG/1
1000 CAPSULE ORAL DAILY
Refills: 0 | Status: DISCONTINUED | OUTPATIENT
Start: 2021-07-25 | End: 2021-08-03

## 2021-07-25 RX ADMIN — PREGABALIN 1000 MICROGRAM(S): 225 CAPSULE ORAL at 11:50

## 2021-07-25 RX ADMIN — Medication 6 MILLIGRAM(S): at 23:24

## 2021-07-25 RX ADMIN — NYSTATIN CREAM 1 APPLICATION(S): 100000 CREAM TOPICAL at 17:11

## 2021-07-25 RX ADMIN — PANTOPRAZOLE SODIUM 40 MILLIGRAM(S): 20 TABLET, DELAYED RELEASE ORAL at 06:05

## 2021-07-25 RX ADMIN — Medication 100 MILLIGRAM(S): at 11:51

## 2021-07-25 RX ADMIN — Medication 6 MILLIGRAM(S): at 17:13

## 2021-07-25 RX ADMIN — ENOXAPARIN SODIUM 40 MILLIGRAM(S): 100 INJECTION SUBCUTANEOUS at 11:50

## 2021-07-25 RX ADMIN — Medication 20 MILLIGRAM(S): at 06:05

## 2021-07-25 RX ADMIN — Medication 6 MILLIGRAM(S): at 00:20

## 2021-07-25 RX ADMIN — NYSTATIN CREAM 1 APPLICATION(S): 100000 CREAM TOPICAL at 06:13

## 2021-07-25 RX ADMIN — Medication 6 MILLIGRAM(S): at 06:06

## 2021-07-25 RX ADMIN — Medication 5 MILLIGRAM(S): at 11:51

## 2021-07-25 RX ADMIN — SENNA PLUS 2 TABLET(S): 8.6 TABLET ORAL at 23:04

## 2021-07-25 RX ADMIN — Medication 2: at 12:00

## 2021-07-25 RX ADMIN — POLYETHYLENE GLYCOL 3350 17 GRAM(S): 17 POWDER, FOR SOLUTION ORAL at 11:50

## 2021-07-25 RX ADMIN — Medication 6 MILLIGRAM(S): at 11:50

## 2021-07-25 NOTE — PROGRESS NOTE ADULT - ASSESSMENT
81 year old M with hx of Guillain [place] in 1996, alcoholic cirrhosis, was sent by Dr. Cole neurologist for worsening quadriparesis. On MRI C spine found to have severe spinal stenosis with cord compression. No urinary incontinence or saddle anesthesia. Has been having worsening weakness over two months with BLE numbness.     -Upper and lower extremity weakness: secondary to cervical spine compression.   Progressive symptoms since April. CT C spine with  Spinal stenosis and cord compression related to a broad-based disc protrusion of uncertain acuity at C2-3    Pt is in agreement for surgery. Cardio and GI clearance obtained as requested by ortho-spine Dr Knott. Transfer center contacted.     Cirrhosis/GI evaluation : Pt has a Meld score of 12, 6.0 % estimated 3 month mortality. Child Class A:  Class A 10 % mortality rate for  surgery and is generally considered a safe surgical candidate   Cardio clearance: echo preserved EF, g1DD. Pt with RBBB from prior EKG. No further cardiac workup indicated.     -Cervical spine cord compression: On decadron.   Neurological status stable. Pt for surgical intervention once cleared. Likely Monday     -Multilevel degenerative changes.   CT brain: mild to moderate cerebral volume loss and involutional change. No CT evidence of an acute infarct, hemorrhage, or mass lesion.  -MRI TS result- Thoracic spondylosis with ventral DISH. Normal spinal canal contents.  -MRI cervical spine shows Spinal stenosis and cord compression related to a broad-based disc protrusion of uncertain acuity at C2-3 as detailed above  -MRI LS result Spondylosis resulting in mild spinal stenosis at L4-5 as above. No acute findings.    ·  Problem: HTN (hypertension). Plan: Controlled  -'s  - continue home meds propanolol and lasix.    ·  Problem: Acute kidney injury. Plan: -Resolved   -likely prerenal   -Scr WNL  -Monitor BMP daily  -avoid nephrotoxic meds.    Problem: Cirrhosis 2/2 alcohol. Hx of Variceal bleed in the past.  GI consulted for clearance for surgery. Pt follows with a hepatologist   As per previous charts: 04/2020:  Decompensated Alcoholic Cirrhosis (now sober, off transplant list at [facility] as improved, w/ Small Grade 1 Esophogeal Varices (EGD 9/19) w/ hx Esophageal Bleeding s/p banding 2018 on Propanolol, hx of Hepatic Encephalopathy on Lactulose, Negative HCC w/u as of 4/19, no ascites of 10/19), Porcelain Gallbladder, known R Rectus Sheath Mass, Liver lesion and R Renal pole lesions of unclear etiology, SMA stenosis and recent hospitalizations (11/2019) for severe RUQ pain 2/2 pancreatitis from cholelithiasis/choledocholithiasis. s/p ERCP with stone extraction and f/u hospitalization with plastic stent placement w/o spontaneous dissolution and hematochezia 2/2 hemorrhoids, colonic AVMs (s/p cauterization), and on 4/2/2020 for another GI bleed.     -GBS (1996, hospitalized [id number] w/ paralysis),    ·  Problem: DVT prophylaxis.  Plan: IMPROVE VTE Individual Risk Assessment  RISK                                                                Points  [  ] Previous VTE                                                  3  [  ] Thrombophilia                                               2  [  ] Lower limb paralysis                                      2        (unable to hold up >15 seconds)    [  ] Current Cancer                                              2         (within 6 months)  [x  ] Immobilization > 24 hrs                                1  [  ] ICU/CCU stay > 24 hours                              1  [x  ] Age > 60                                                      1  IMPROVE VTE Score _________2, -- for DVT proph    Lovenox on past admission for DVT ppx.     ·  Problem: Goals of care, counseling/discussion.  Plan: -Full code  -pt and wife Shannon agreeable for surgical intervention. Transfer center contacted.                  Discussed with family and patient. They are all in agreement for surgery. Discussed with Dr Knott from Neurosurgery. Pt to be transferred to [facility] once bed available. Possible discharge.

## 2021-07-25 NOTE — CONSULT NOTE ADULT - ASSESSMENT
81 year old male with cirrhosis   Cirrhosis   Meld 12 6.0 % estimated 3 month mortality   Child Class A:  Class A 10 % mortality rate for  surgery and is generally considered a safe surgical candidate       Fracture   Surgical intervention as per surgical team

## 2021-07-25 NOTE — PATIENT PROFILE ADULT - BRAND OF COVID-19 VACCINATION
Pt does not remember what vaccine or date of last vaccination but believes it was in May. RN attempted to call wife for information - did not answer. Pt does not remember what vaccine or date of last vaccination but believes it was in May. RN attempted to call wife for information - did not answer./Pfizer dose 1 and 2

## 2021-07-25 NOTE — PROGRESS NOTE ADULT - ASSESSMENT
CARDIOLOGY ATTENDING    Agree with above. Echo unremarkable. No further inpatient cardiac workup expected. CARDIOLOGY ATTENDING    Agree with above. Echo unremarkable. No further inpatient cardiac workup expected

## 2021-07-25 NOTE — H&P ADULT - NSICDXPASTMEDICALHX_GEN_ALL_CORE_FT
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

## 2021-07-25 NOTE — PROGRESS NOTE ADULT - SUBJECTIVE AND OBJECTIVE BOX
pt seen and examined, no complaints, ROS - .           cyanocobalamin 1000 MICROGram(s) Oral daily  dexAMETHasone  Injectable 6 milliGRAM(s) IV Push every 6 hours  dextrose 40% Gel 15 Gram(s) Oral once  dextrose 5%. 1000 milliLiter(s) IV Continuous <Continuous>  dextrose 5%. 1000 milliLiter(s) IV Continuous <Continuous>  dextrose 50% Injectable 25 Gram(s) IV Push once  dextrose 50% Injectable 12.5 Gram(s) IV Push once  dextrose 50% Injectable 25 Gram(s) IV Push once  enoxaparin Injectable 40 milliGRAM(s) SubCutaneous daily  folic acid 5 milliGRAM(s) Oral daily  furosemide    Tablet 20 milliGRAM(s) Oral daily  glucagon  Injectable 1 milliGRAM(s) IntraMuscular once  insulin lispro (ADMELOG) corrective regimen sliding scale   SubCutaneous three times a day before meals  nystatin Powder 1 Application(s) Topical two times a day  pantoprazole    Tablet 40 milliGRAM(s) Oral before breakfast  polyethylene glycol 3350 17 Gram(s) Oral daily  propranolol 10 milliGRAM(s) Oral daily  senna 2 Tablet(s) Oral at bedtime  sodium chloride 0.9%. 1000 milliLiter(s) IV Continuous <Continuous>  thiamine 100 milliGRAM(s) Oral daily                            12.5   10.30 )-----------( 146      ( 25 Jul 2021 06:10 )             37.9       Hemoglobin: 12.5 g/dL (07-25 @ 06:10)  Hemoglobin: 13.2 g/dL (07-24 @ 05:55)  Hemoglobin: 12.2 g/dL (07-23 @ 07:37)  Hemoglobin: 13.0 g/dL (07-22 @ 07:38)  Hemoglobin: 14.2 g/dL (07-21 @ 15:33)      07-25    139  |  105  |  31<H>  ----------------------------<  125<H>  4.4   |  25  |  1.20    Ca    8.7      25 Jul 2021 06:10    TPro  6.8  /  Alb  2.6<L>  /  TBili  0.4  /  DBili  x   /  AST  19  /  ALT  17  /  AlkPhos  140<H>  07-25    Creatinine Trend: 1.20<--, 1.14<--, 1.03<--, 1.15<--, 1.52<--    COAGS:           T(C): 36.7 (07-25-21 @ 05:39), Max: 36.7 (07-25-21 @ 05:39)  HR: 51 (07-25-21 @ 05:39) (51 - 60)  BP: 139/88 (07-25-21 @ 05:39) (108/64 - 139/88)  RR: 16 (07-25-21 @ 05:39) (16 - 20)  SpO2: 97% (07-25-21 @ 05:39) (97% - 100%)  Wt(kg): --    I&O's Summary      HEENT:   Normal oral mucosa, PERRL, EOMI	  Lymphatic: No lymphadenopathy , no edema  Cardiovascular: Normal S1 S2, No JVD, No murmurs , Peripheral pulses palpable 2+ bilaterally  Respiratory: Lungs clear to auscultation, normal effort 	  Gastrointestinal:  Soft, Non-tender, + BS	  Skin: No rashes, No ecchymoses, No cyanosis, warm to touch  Psychiatry:  Mood & affect appropriate      TELEMETRY: 	     	  RADIOLOGY:  OTHER:     DIAGNOSTIC TESTING:  [ ] Echocardiogram: < from: Transthoracic Echocardiogram (02.13.21 @ 14:18) >  Normal left ventricular systolic function. No segmental  wall motion abnormalities.  Mild aortic stenosis.  No vegeetations seen.    < end of copied text >    ECHO: eh< from: Transthoracic Echocardiogram (07.23.21 @ 14:50) >  CONCLUSIONS:  1. Normal mitral valve. Mild mitral regurgitation.  2. Calcified trileaflet aortic valve with decreased  opening. Peak transaortic valve gradient equals 17.6 mm Hg,  estimated aortic valve area equals 2 sqcm (by planimetry),  consistent with mild aortic stenosis. Mild aortic  regurgitation.  3. Aortic Root: 3.6 cm.  4. Normal left ventricular internal dimensions and wall  thicknesses.  5. Normal Left Ventricular Systolic Function,  (EF = 55 to  60%)  6. Grade I diastolic dysfunction (Impaired relaxation).  7. Normal right atrium.  8. Normal right ventricular size and systolic function  (TAPSE  2.3cm).  9. RV systolic pressure is normal at  29 mm Hg.  10. There is trace tricuspid regurgitation.  11. There is mild pulmonic regurgitation.  12. Normal pericardium with no pericardial effusion.    ------------------------------------------------------------------------  Confirmed on  7/24/2021 - 08:55:48 by Nuha Uribe MD  ------------------------------------------------------------------------    < end of copied text >    [ ]  Catheterization:  ASSESSMENT/PLAN:   81 year old male with history of liver cirrhosis, history of guillain-barre syndrome, who is being seen for abnormal ECG and preop risk assessment.    - tolerating propranolol   - keep net net with po lasix   - steroid taper per med   - ECHO results noted   - ortho-spine follow up   -pt. with no chest pain or anginal symptoms  -no clinical heart failure on exam  -pt. with RBBB on ecg that was seen on prior ecg

## 2021-07-25 NOTE — CHART NOTE - NSCHARTNOTEFT_GEN_A_CORE
Contacted pt's wife Shannon at [phone], and updated on pt's clinical status and pt is for transfer to [facility] / [place].  All questions and concerns addressed.       Edith Dominguez NP Medicine

## 2021-07-25 NOTE — H&P ADULT - HISTORY OF PRESENT ILLNESS
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 After the episode pt was able to ambulate using a cane and eventually regained normal strength. In october pt started experiencing tingling in the toes which eventually caused him to fall leading to Right sided hip fracture which was surgically repaired. Pt continued rehab after surgery and was able to ambulate using walker. In february 2021 pt received the 2nd dose of Pfizer vaccine and the tingling sensation in his toes started to get worse.  For the last 6 weeks pt has been bed bound as he has little to no strength in his lower extremities. Pt went to the clinic of Dr. Clarke where he was examined and told to come to the ED. He was brought in by his daughter and wife who were contacted to get most of the history as pt wasn't too clear on the timeline.

## 2021-07-25 NOTE — H&P ADULT - ASSESSMENT
Anthony Florez     81M hx Cesarioanai Mccallum in 1996, alcoholic cirrhosis sent to ER for functional quadraparesis, unable to walk or use hands per wife for >2mo. 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 Exam: Awake, alert, poor historian, UE 4/5,  4/5, no hoffmans, HF 4/5, PF/DF 5/5, no clonus.  -Adm 7T to Dr. Knott  -Consented wife for C2-4 PCDF  -PAS/K2M/[employer] aware  -Added on to OR  -Cleared by cards/GI at [facility]

## 2021-07-25 NOTE — H&P ADULT - NSHPLABSRESULTS_GEN_ALL_CORE
13.1   8.95  )-----------( 171      ( 25 Jul 2021 20:50 )             40.5     07-25    139  |  105  |  31<H>  ----------------------------<  125<H>  4.4   |  25  |  1.20    Ca    8.7      25 Jul 2021 06:10    TPro  6.8  /  Alb  2.6<L>  /  TBili  0.4  /  DBili  x   /  AST  19  /  ALT  17  /  AlkPhos  140<H>  07-25

## 2021-07-25 NOTE — ACUTE INTERFACILITY TRANSFER NOTE - HOSPITAL COURSE
81 year old male with PMHx of Guillain-[place] s/p flu vaccine 25 years ago, HTN, liver cirrhosis and PSHx of right hip replacement presents to the ED with progressive lower extremity weakness since his COVID vaccine in Feb 2021. was sent by Dr. Cole neurologist to the hospital for worsening quadriparesis. Patient is being admitted for neuro work up of weakness.    -CT cervical spine: Multilevel degenerative changes. No acute fx  -CT brain: mild to moderate cerebral volume loss and involutional change. No CT evidence of an acute infarct, hemorrhage, or mass lesion.  -MRI TS result- Thoracic spondylosis with ventral DISH. Normal spinal canal contents.  -MRI cervical spine shows Spinal stenosis and cord compression related to a broad-based disc protrusion of uncertain acuity at C2-3 as detailed above  -MRI LS result Spondylosis resulting in mild spinal stenosis at L4-5 as above. No acute findings.    Neurology and ortho/spinal surgery consulted. dr. Knott offered cervical spine surgical intervention. Cardiology consulted for clearance. Echo ordered.   started dexamethasone with sliding scales.   Pt will need to transfer to [facility] for surgical intervention of the cervical spine per Dr. Knott.   81 year old male with PMHx of Guillain-[place] s/p flu vaccine 25 years ago, HTN, liver cirrhosis and PSHx of right hip replacement presents to the ED with progressive lower extremity weakness since his COVID vaccine in Feb 2021. was sent by Dr. Cole neurologist to the hospital for worsening quadriparesis. Patient is being admitted for neuro work up of weakness.    -CT cervical spine: Multilevel degenerative changes. No acute fx  -CT brain: mild to moderate cerebral volume loss and involutional change. No CT evidence of an acute infarct, hemorrhage, or mass lesion.  -MRI TS result- Thoracic spondylosis with ventral DISH. Normal spinal canal contents.  -MRI cervical spine shows Spinal stenosis and cord compression related to a broad-based disc protrusion of uncertain acuity at C2-3 as detailed above  -MRI LS result Spondylosis resulting in mild spinal stenosis at L4-5 as above. No acute findings.    Neurology and ortho/spinal surgery consulted. Dr. Knott offered cervical spine surgical intervention. Cardiology consulted for clearance. Echo shows `EF 55-60%, normal LVEF, G1DD.   Started on dexamethasone with sliding scales.   Pt will need to transfer to [facility] for surgical intervention of the cervical spine per Dr. Knott. Pt and wife agreeable to plan.    81 year old male with PMHx of Guillain-[place] s/p flu vaccine 25 years ago, HTN, liver cirrhosis and PSHx of right hip replacement presents to the ED with progressive lower extremity weakness since his COVID vaccine in Feb 2021. was sent by Dr. Cole neurologist to the hospital for worsening quadriparesis. Patient is being admitted for neuro work up of weakness.    -CT cervical spine: Multilevel degenerative changes. No acute fx  -CT brain: mild to moderate cerebral volume loss and involutional change. No CT evidence of an acute infarct, hemorrhage, or mass lesion.  -MRI TS result- Thoracic spondylosis with ventral DISH. Normal spinal canal contents.  -MRI cervical spine shows Spinal stenosis and cord compression related to a broad-based disc protrusion of uncertain acuity at C2-3 as detailed above  -MRI LS result Spondylosis resulting in mild spinal stenosis at L4-5 as above. No acute findings.    Neurology and ortho/spinal surgery consulted. Dr. Knott offered cervical spine surgical intervention. Cardiology consulted for clearance. Echo shows `EF 55-60%, normal LVEF, G1DD.   Started on dexamethasone with sliding scales.   Pt will need to transfer to [facility] for surgical intervention of the cervical spine per Dr. Knott. Pt and wife agreeable to plan.

## 2021-07-25 NOTE — ACUTE INTERFACILITY TRANSFER NOTE - PLAN OF CARE
Achieve optimal function Resolved.   Avoid taking (NSAIDs) - (ex: Ibuprofen, Advil, Celebrex, Naprosyn)  Avoid taking any nephrotoxic agents (can harm kidneys) - Intravenous contrast for diagnostic testing, combination cold medications.  Have all medications adjusted for your renal function by your Health Care Provider.  Blood pressure control is important.  Take all medication as prescribed. Safety precautions  PT Continue DVT and GI ppx Continue Dexamethasone as prescribed.  Continue treatment/management per medical staff at next Facility.  Follow-up with Dr. Knott regarding spinal surgery. Maintain controlled BP Continue propanolol and lasix per home regimen. MRI C/T/LS spine shows multidegenerative disorder.  Continue Dexamethasone for Cervical spine stenosis and cord compression.  Continue treatment/management per medical staff at next Facility.  Follow-up with Dr. Knott regarding spinal surgery.  PT   Safety precautions Free of acute encephalopathy Continue thiamine, cyanocobalamin, folic acid as prescribed  Monitor   Safety measures  Supportive measures Maintain normal kidney function

## 2021-07-25 NOTE — PROGRESS NOTE ADULT - SUBJECTIVE AND OBJECTIVE BOX
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 After the episode pt was able to ambulate using a cane and eventually regained normal strength. In october pt started experiencing tingling in the toes which eventually caused him to fall leading to Right sided hip fracture which was surgically repaired. Pt continued rehab after surgery and was able to ambulate using walker. In february 2021 pt received the 2nd dose of Pfizer vaccine and the tingling sensation in his toes started to get worse.  For the last 6 weeks pt has been bed bound as he has little to no strength in his lower extremities. Pt went to the clinic of Dr. Clarke where he was examined and told to come to the ED. He was brought in by his daughter and wife who were contacted to get most of the history as pt wasn't too clear on the timeline.     Pt denies any complaints. Pt is voiding freely.     PAST MEDICAL & SURGICAL HISTORY:  HTN (hypertension)    Guillain-[place] syndrome  1996 after flu vaccine    Liver cirrhosis  alcoholic/WALL cirrhosis c/b variceal bleed s/p banding (8/2018) and hepatic encephalopathy (several years ago)    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)    Pancreatitis  2/2 choledocholithiasis s/p ERCP with stone extraction and plastic stent placement (11/2019, stent now removed)    Hematochezia  2/2 colonic AVMs s/p cauterization and hemorrhoids (11/2019)    Melena  2/2 duodenal ulcers s/p argon plasma coagulation (4/2020)    H/O inguinal hernia repair    History of repair of hip fracture    History of hip replacement  10/2020      MEDICATIONS  (STANDING):  cyanocobalamin 1000 MICROGram(s) Oral daily  dexAMETHasone  Injectable 6 milliGRAM(s) IV Push every 6 hours  dextrose 40% Gel 15 Gram(s) Oral once  dextrose 5%. 1000 milliLiter(s) (50 mL/Hr) IV Continuous <Continuous>  dextrose 5%. 1000 milliLiter(s) (100 mL/Hr) IV Continuous <Continuous>  dextrose 50% Injectable 25 Gram(s) IV Push once  dextrose 50% Injectable 12.5 Gram(s) IV Push once  dextrose 50% Injectable 25 Gram(s) IV Push once  enoxaparin Injectable 40 milliGRAM(s) SubCutaneous daily  folic acid 5 milliGRAM(s) Oral daily  furosemide    Tablet 20 milliGRAM(s) Oral daily  glucagon  Injectable 1 milliGRAM(s) IntraMuscular once  insulin lispro (ADMELOG) corrective regimen sliding scale   SubCutaneous three times a day before meals  nystatin Powder 1 Application(s) Topical two times a day  pantoprazole    Tablet 40 milliGRAM(s) Oral before breakfast  polyethylene glycol 3350 17 Gram(s) Oral daily  propranolol 10 milliGRAM(s) Oral daily  senna 2 Tablet(s) Oral at bedtime  sodium chloride 0.9%. 1000 milliLiter(s) (60 mL/Hr) IV Continuous <Continuous>  thiamine 100 milliGRAM(s) Oral daily    EXAM:  Vital Signs Last 24 Hrs  T(C): 36.7 (25 Jul 2021 05:39), Max: 36.7 (25 Jul 2021 05:39)  T(F): 98.1 (25 Jul 2021 05:39), Max: 98.1 (25 Jul 2021 05:39)  HR: 51 (25 Jul 2021 05:39) (51 - 60)  BP: 139/88 (25 Jul 2021 05:39) (108/64 - 139/88)  BP(mean): --  RR: 16 (25 Jul 2021 05:39) (16 - 20)  SpO2: 97% (25 Jul 2021 05:39) (97% - 100%)    PHYSICAL EXAM:  Constitutional: awake, nad.   Neck: supple  Respiratory: bilaterally clear to auscultation, no wheezing, no rhonchi, no crackles, no decreased air entry  Cardiovascular: s1s2, rrr  Gastrointestinal: soft,  Extremities: no edema.   Neurological: alert and oriented to person, date and place.   Skin: intact no rash, warm to touch.   Musculoskeletal: moves all 4 extremities.4/5 bilateral lower extremities.   Psychiatric: appropriate affect.     LABS:  LIVER FUNCTIONS - ( 25 Jul 2021 06:10 )  Alb: 2.6 g/dL / Pro: 6.8 g/dL / ALK PHOS: 140 U/L / ALT: 17 U/L DA / AST: 19 U/L / GGT: x                                 12.5   10.30 )-----------( 146      ( 25 Jul 2021 06:10 )             37.9     07-25    139  |  105  |  31<H>  ----------------------------<  125<H>  4.4   |  25  |  1.20    Ca    8.7      25 Jul 2021 06:10    TPro  6.8  /  Alb  2.6<L>  /  TBili  0.4  /  DBili  x   /  AST  19  /  ALT  17  /  AlkPhos  140<H>  07-25    < from: Transthoracic Echocardiogram (07.23.21 @ 14:50) >  CONCLUSIONS:  1. Normal mitral valve. Mild mitral regurgitation.  2. Calcified trileaflet aortic valve with decreased  opening. Peak transaortic valve gradient equals 17.6 mm Hg,  estimated aortic valve area equals 2 sqcm (by planimetry),  consistent with mild aortic stenosis. Mild aortic  regurgitation.  3. Aortic Root: 3.6 cm.  4. Normal left ventricular internal dimensions and wall  thicknesses.  5. Normal Left Ventricular Systolic Function,  (EF = 55 to  60%)  6. Grade I diastolic dysfunction (Impaired relaxation).  7. Normal right atrium.  8. Normal right ventricular size and systolic function  (TAPSE  2.3cm).  9. RV systolic pressure is normal at  29 mm Hg.  10. There is trace tricuspid regurgitation.  11. There is mild pulmonic regurgitation.  12. Normal pericardium with no pericardial effusion    < end of copied text >

## 2021-07-25 NOTE — CONSULT NOTE ADULT - SUBJECTIVE AND OBJECTIVE BOX
Patient is a 81y old  Male who presents with a chief complaint of weakness (25 Jul 2021 08:22)     .     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 After the episode pt was able to ambulate using a cane and eventually regained normal strength. In october pt started experiencing tingling in the toes which eventually caused him to fall leading to Right sided hip fracture which was surgically repaired. Pt continued rehab after surgery and was able to ambulate using walker. In february 2021 pt received the 2nd dose of Pfizer vaccine and the tingling sensation in his toes started to get worse.  For the last 6 weeks pt has been bed bound as he has little to no strength in his lower extremities. Pt went to the clinic of Dr. Clarke where he was examined and told to come to the ED. He was brought in by his daughter and wife who were contacted to get most of the history as pt wasn't too clear on the timeline.        (21 Jul 2021 17:05)          REVIEW OF SYSTEMS  Constitutional:   No fever, no fatigue, no pallor, no night sweats, no weight loss.  HEENT:   No eye pain, no vision changes, no icterus, no mouth ulcers.  Respiratory:   No shortness of breath, no cough, no respiratory distress.   Cardiovascular:   No chest pain, no palpitations.   Gastrointestinal: No abdominal pain, no nausea, no vomiting , no diahrrea, no constipation, no hematochezia,no melena.  Skin:   No rashes, no jaundice, no eczema.   Musculoskeletal:   No joint pain, no swelling, no myalgia.   Neurologic:   No headache, no seizure, no weakness.   Genitourinary:   No dysuria, no decreased urine output.  Psychiatric:  No depression, no anxiety,   Endocrine:   No thyroid disease, no diabetes.  Heme/Lymphatic:   No anemia, no blood transfusions, no lymph node enlargement, no bleeding, no bruising.  ___________________________________________________________________________________________  Allergies    No Known Allergies    Intolerances      MEDICATIONS  (STANDING):  cyanocobalamin 1000 MICROGram(s) Oral daily  dexAMETHasone  Injectable 6 milliGRAM(s) IV Push every 6 hours  dextrose 40% Gel 15 Gram(s) Oral once  dextrose 5%. 1000 milliLiter(s) (50 mL/Hr) IV Continuous <Continuous>  dextrose 5%. 1000 milliLiter(s) (100 mL/Hr) IV Continuous <Continuous>  dextrose 50% Injectable 25 Gram(s) IV Push once  dextrose 50% Injectable 12.5 Gram(s) IV Push once  dextrose 50% Injectable 25 Gram(s) IV Push once  enoxaparin Injectable 40 milliGRAM(s) SubCutaneous daily  folic acid 5 milliGRAM(s) Oral daily  furosemide    Tablet 20 milliGRAM(s) Oral daily  glucagon  Injectable 1 milliGRAM(s) IntraMuscular once  insulin lispro (ADMELOG) corrective regimen sliding scale   SubCutaneous three times a day before meals  nystatin Powder 1 Application(s) Topical two times a day  pantoprazole    Tablet 40 milliGRAM(s) Oral before breakfast  polyethylene glycol 3350 17 Gram(s) Oral daily  propranolol 10 milliGRAM(s) Oral daily  senna 2 Tablet(s) Oral at bedtime  sodium chloride 0.9%. 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)    Pancreatitis  2/2 choledocholithiasis s/p ERCP with stone extraction and plastic stent placement (11/2019, stent now removed)    Hematochezia  2/2 colonic AVMs s/p cauterization and hemorrhoids (11/2019)    Melena  2/2 duodenal ulcers s/p argon plasma coagulation (4/2020)    H/O inguinal hernia repair    History of repair of hip fracture    History of hip replacement  10/2020      FAMILY HISTORY:  Family history of ovarian cancer (Sibling)      Social History: No hsitory of : Tobacco use, IVDA, EToH  ______________________________________________________________________________________    PHYSICAL EXAM    Daily     Daily   BMI: 26.6 (07-21 @ 13:53)  Change in Weight:  Vital Signs Last 24 Hrs  T(C): 36.7 (25 Jul 2021 05:39), Max: 36.7 (25 Jul 2021 05:39)  T(F): 98.1 (25 Jul 2021 05:39), Max: 98.1 (25 Jul 2021 05:39)  HR: 51 (25 Jul 2021 05:39) (51 - 60)  BP: 139/88 (25 Jul 2021 05:39) (108/64 - 139/88)  BP(mean): --  RR: 16 (25 Jul 2021 05:39) (16 - 20)  SpO2: 97% (25 Jul 2021 05:39) (97% - 100%)    General:  Well developed, well nourished, alert and active, no pallor, NAD.  HEENT:    Normal appearance of conjunctiva, ears, nose, lips, oropharynx, and oral mucosa, anicteric.  Neck:  No masses, no asymmetry.  Lymph Nodes:  No lymphadenopathy.   Cardiovascular:  RRR normal S1/S2, no murmur.  Respiratory:  CTA B/L, normal respiratory effort.   Abdominal:   soft, no masses or tenderness, normoactive BS, NT/ND, no HSM.  Extremities:   No clubbing or cyanosis, normal capillary refill, no edema.   Skin:   No rash, jaundice, lesions, eczema.   Musculoskeletal:  No joint swelling, erythema or tenderness.   Neuro: No focal deficits.   Other:   _______________________________________________________________________________________________  Lab Results:                          12.5   10.30 )-----------( 146      ( 25 Jul 2021 06:10 )             37.9     07-25    139  |  105  |  31<H>  ----------------------------<  125<H>  4.4   |  25  |  1.20    Ca    8.7      25 Jul 2021 06:10    TPro  6.8  /  Alb  2.6<L>  /  TBili  0.4  /  DBili  x   /  AST  19  /  ALT  17  /  AlkPhos  140<H>  07-25    LIVER FUNCTIONS - ( 25 Jul 2021 06:10 )  Alb: 2.6 g/dL / Pro: 6.8 g/dL / ALK PHOS: 140 U/L / ALT: 17 U/L DA / AST: 19 U/L / GGT: x                   Stool Results:          RADIOLOGY RESULTS:  < from: CT Abdomen and Pelvis w/ IV Cont (02.06.21 @ 18:44) >    EXAM:  CT ABDOMEN AND PELVIS IC        PROCEDURE DATE:  Feb 6 2021         INTERPRETATION:  CLINICAL INFORMATION: Acute abdominal pain.    COMPARISON: CT abdomen/pelvis 4/1/2020.    PROCEDURE:  CT of the Abdomen and Pelvis was performed with intravenous contrast.  Intravenous contrast: 90 ml Omnipaque 350. 10 ml discarded.  Oral contrast: None.  Sagittal and coronal reformats were performed.    FINDINGS:  LOWER CHEST: Aortic valve and coronary artery calcifications..    LIVER: Cirrhosis.  BILEDUCTS: Mild intrahepatic biliary ductal dilation, new since 4/1/2020.  GALLBLADDER: Distended gallbladder and cholelithiasis with irregular wall thickening.  SPLEEN: Within normal limits.  PANCREAS: Small focus of air within the main pancreatic duct in region of prior stent  ADRENALS: Within normal limits.  KIDNEYS/URETERS: Within normal limits.    BLADDER: Within normal limits.  REPRODUCTIVE ORGANS: The prostate is normal in size    BOWEL: No bowel obstruction. Appendix is normal.  PERITONEUM: No ascites.  VESSELS: Within normal limits.  RETROPERITONEUM/LYMPH NODES: No lymphadenopathy.  ABDOMINAL WALL: Unchanged 2.3 x 4 cm mass in the right rectus sheath and right inguinal hernia repair plug.  BONES: Degenerative changes. Right hip arthroplasty.    IMPRESSION:    Mild intrahepatic biliary ductal dilation and irregular gallbladder wall, new since 4/1/2020 with interval removal of pancreatic duct stent. Correlation with HIDA scan can be performed for further evaluation.              TRACEE SMITH MD; Resident Interventional Radiology  This document has been electronically signed.  RICARDO BARTON MD; Attending Radiologist  This document has been electronically signed. Feb 6 2021  7:18PM    < end of copied text >  < from: ERCP (06.22.21 @ 08:16) >    [facility]  _______________________________________________________________________________  Patient Name: Anthony Florez            Procedure Date: 6/22/2021 8:16 AM  MRN: [id number]                     Account Number: [id number]  YOB: 1940              Admit Type: Outpatient  Room: [place] [address]                         Gender: Male  Attending MD: AUGUSTINA GARRETT MD      _______________________________________________________________________________     Procedure:           ERCP  Indications:         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 ERCP scheduled for                        stent removal and therapy.  Providers:           AUGUSTINA GARRETT MD  Medicines:           General Anesthesia                       Fluoro: 3.7 min/39.8 mgy/77 kV                       IVF                       Cipro 400 mg IV  Complications:       No immediate complications.  Procedure:           Pre-Anesthesia Assessment:                       - Prior to the procedure, a History and Physical was                        performed, and patient medications, allergies and                        sensitivities were reviewed. The patient's tolerance of                      previous anesthesia was reviewed.                       After obtaining informed consent, the scope was passed                        under direct vision. Throughout the procedure, the                        patient's blood pressure, pulse, and oxygen saturations                        were monitored continuously. The ERCP was introduced                        through the mouth, and advanced to the duodenum and used                        to inject contrast into the bile duct. The ERCP was                        accomplished with ease. The patient tolerated the                        procedure well.                                                                                   Findings:       EGD:       -The esophagus was normal.       -The stomach was normal.       -The duodenal bulb was normal. D2 contained a stent.       ERCP:       -The duodenoscope was advanced to the ampulla.       -The prior placed stent was seen with biliary sphincterotomy.       -The stent was removed with a snare.       -The bile duct was cannulated using a preloaded Jag revolution        sphincterotome.       -Contrast was injected. I acquired and interpreted the images. There was        a GB stone protruding into the CBD causing biliary obstruction.       -Digital cholangioscopy was performed.       -The stone was visualized and the EHL performed fragmenting the stone.       -A balloon catheter was sued to sweep the stone fragments.       -Repeat cholangiogram showed decreased stone burden in the CBD.       -A 10Fr-7 cm stent was placed. There was good flow of bile.                                                                                   Impression:          - Endoscopic retrograde cholangiopancreatography with                   stent exchange, cholangioscopy with EHL, and stone                        removal.  Recommendation:      - Discharge patient to home (ambulatory).                       - Repeat ERCP in 3-4 months for stent removal and          reevaluation.                                                                                   Attending Participation:       I personally performed the entire procedure.              ___________________  AUGUSTINA GARRETT MD  6/22/2021 10:31:47 AM  This report has been signed electronically.  Number of Addenda: 0    Note Initiated On: 6/22/2021 8:16 AM    < end of copied text >    SURGICAL PATHOLOGY:

## 2021-07-25 NOTE — ACUTE INTERFACILITY TRANSFER NOTE - SECONDARY DIAGNOSIS.
DVT prophylaxis Liver cirrhosis Guillain Barr\'e9 syndrome HTN (hypertension) Neurodegenerative disorder Acute kidney injury

## 2021-07-26 LAB
ANION GAP SERPL CALC-SCNC: 13 MMOL/L — SIGNIFICANT CHANGE UP (ref 5–17)
APTT BLD: 28.4 SEC — SIGNIFICANT CHANGE UP (ref 27.5–35.5)
APTT BLD: 28.7 SEC — SIGNIFICANT CHANGE UP (ref 27.5–35.5)
APTT BLD: 29.1 SEC — SIGNIFICANT CHANGE UP (ref 27.5–35.5)
APTT BLD: 39.7 SEC — HIGH (ref 27.5–35.5)
BUN SERPL-MCNC: 27 MG/DL — HIGH (ref 7–23)
CALCIUM SERPL-MCNC: 8.6 MG/DL — SIGNIFICANT CHANGE UP (ref 8.4–10.5)
CCP IGG SERPL-ACNC: <8 UNITS — SIGNIFICANT CHANGE UP
CHLORIDE SERPL-SCNC: 105 MMOL/L — SIGNIFICANT CHANGE UP (ref 96–108)
CO2 SERPL-SCNC: 24 MMOL/L — SIGNIFICANT CHANGE UP (ref 22–31)
COVID-19 SPIKE DOMAIN AB INTERP: NEGATIVE — SIGNIFICANT CHANGE UP
COVID-19 SPIKE DOMAIN ANTIBODY RESULT: 0.4 U/ML — SIGNIFICANT CHANGE UP
CREAT SERPL-MCNC: 0.82 MG/DL — SIGNIFICANT CHANGE UP (ref 0.5–1.3)
GAS PNL BLDA: SIGNIFICANT CHANGE UP
GAS PNL BLDA: SIGNIFICANT CHANGE UP
GLUCOSE BLDC GLUCOMTR-MCNC: 113 MG/DL — HIGH (ref 70–99)
GLUCOSE BLDC GLUCOMTR-MCNC: 130 MG/DL — HIGH (ref 70–99)
GLUCOSE BLDC GLUCOMTR-MCNC: 133 MG/DL — HIGH (ref 70–99)
GLUCOSE BLDC GLUCOMTR-MCNC: 142 MG/DL — HIGH (ref 70–99)
GLUCOSE BLDC GLUCOMTR-MCNC: 175 MG/DL — HIGH (ref 70–99)
GLUCOSE BLDC GLUCOMTR-MCNC: 312 MG/DL — HIGH (ref 70–99)
GLUCOSE SERPL-MCNC: 131 MG/DL — HIGH (ref 70–99)
HCT VFR BLD CALC: 31.3 % — LOW (ref 39–50)
HGB BLD-MCNC: 10.2 G/DL — LOW (ref 13–17)
INR BLD: 1.29 RATIO — HIGH (ref 0.88–1.16)
INR BLD: 1.32 RATIO — HIGH (ref 0.88–1.16)
INR BLD: 1.36 RATIO — HIGH (ref 0.88–1.16)
INR BLD: 1.47 RATIO — HIGH (ref 0.88–1.16)
MAGNESIUM SERPL-MCNC: 2 MG/DL — SIGNIFICANT CHANGE UP (ref 1.6–2.6)
MCHC RBC-ENTMCNC: 26.5 PG — LOW (ref 27–34)
MCHC RBC-ENTMCNC: 32.6 GM/DL — SIGNIFICANT CHANGE UP (ref 32–36)
MCV RBC AUTO: 81.3 FL — SIGNIFICANT CHANGE UP (ref 80–100)
MRSA PCR RESULT.: SIGNIFICANT CHANGE UP
NRBC # BLD: 0 /100 WBCS — SIGNIFICANT CHANGE UP (ref 0–0)
PHOSPHATE SERPL-MCNC: 3.3 MG/DL — SIGNIFICANT CHANGE UP (ref 2.5–4.5)
PLATELET # BLD AUTO: 114 K/UL — LOW (ref 150–400)
POTASSIUM SERPL-MCNC: 3.9 MMOL/L — SIGNIFICANT CHANGE UP (ref 3.5–5.3)
POTASSIUM SERPL-SCNC: 3.9 MMOL/L — SIGNIFICANT CHANGE UP (ref 3.5–5.3)
PROTHROM AB SERPL-ACNC: 15.3 SEC — HIGH (ref 10.6–13.6)
PROTHROM AB SERPL-ACNC: 15.6 SEC — HIGH (ref 10.6–13.6)
PROTHROM AB SERPL-ACNC: 16.1 SEC — HIGH (ref 10.6–13.6)
PROTHROM AB SERPL-ACNC: 17.3 SEC — HIGH (ref 10.6–13.6)
PYRIDOXAL PHOS SERPL-MCNC: 2.9 UG/L — LOW (ref 5.3–46.7)
RBC # BLD: 3.85 M/UL — LOW (ref 4.2–5.8)
RBC # FLD: 16.2 % — HIGH (ref 10.3–14.5)
RF+CCP IGG SER-IMP: NEGATIVE — SIGNIFICANT CHANGE UP
S AUREUS DNA NOSE QL NAA+PROBE: SIGNIFICANT CHANGE UP
SARS-COV-2 IGG+IGM SERPL QL IA: 0.4 U/ML — SIGNIFICANT CHANGE UP
SARS-COV-2 IGG+IGM SERPL QL IA: NEGATIVE — SIGNIFICANT CHANGE UP
SODIUM SERPL-SCNC: 142 MMOL/L — SIGNIFICANT CHANGE UP (ref 135–145)
WBC # BLD: 7.46 K/UL — SIGNIFICANT CHANGE UP (ref 3.8–10.5)
WBC # FLD AUTO: 7.46 K/UL — SIGNIFICANT CHANGE UP (ref 3.8–10.5)

## 2021-07-26 PROCEDURE — 99291 CRITICAL CARE FIRST HOUR: CPT

## 2021-07-26 RX ORDER — HYDROMORPHONE HYDROCHLORIDE 2 MG/ML
0.25 INJECTION INTRAMUSCULAR; INTRAVENOUS; SUBCUTANEOUS
Refills: 0 | Status: DISCONTINUED | OUTPATIENT
Start: 2021-07-26 | End: 2021-07-27

## 2021-07-26 RX ORDER — OXYCODONE HYDROCHLORIDE 5 MG/1
10 TABLET ORAL EVERY 4 HOURS
Refills: 0 | Status: DISCONTINUED | OUTPATIENT
Start: 2021-07-26 | End: 2021-07-27

## 2021-07-26 RX ORDER — ONDANSETRON 8 MG/1
4 TABLET, FILM COATED ORAL ONCE
Refills: 0 | Status: DISCONTINUED | OUTPATIENT
Start: 2021-07-26 | End: 2021-07-27

## 2021-07-26 RX ORDER — INSULIN LISPRO 100/ML
VIAL (ML) SUBCUTANEOUS
Refills: 0 | Status: DISCONTINUED | OUTPATIENT
Start: 2021-07-26 | End: 2021-07-31

## 2021-07-26 RX ORDER — HYDROMORPHONE HYDROCHLORIDE 2 MG/ML
0.5 INJECTION INTRAMUSCULAR; INTRAVENOUS; SUBCUTANEOUS
Refills: 0 | Status: DISCONTINUED | OUTPATIENT
Start: 2021-07-26 | End: 2021-07-27

## 2021-07-26 RX ORDER — OXYCODONE HYDROCHLORIDE 5 MG/1
5 TABLET ORAL EVERY 4 HOURS
Refills: 0 | Status: DISCONTINUED | OUTPATIENT
Start: 2021-07-26 | End: 2021-08-01

## 2021-07-26 RX ORDER — CEFAZOLIN SODIUM 1 G
2000 VIAL (EA) INJECTION EVERY 8 HOURS
Refills: 0 | Status: COMPLETED | OUTPATIENT
Start: 2021-07-26 | End: 2021-07-27

## 2021-07-26 RX ADMIN — Medication 20 MILLIGRAM(S): at 05:21

## 2021-07-26 RX ADMIN — Medication 6 MILLIGRAM(S): at 13:14

## 2021-07-26 RX ADMIN — Medication 100 MILLIGRAM(S): at 22:39

## 2021-07-26 RX ADMIN — HYDROMORPHONE HYDROCHLORIDE 0.25 MILLIGRAM(S): 2 INJECTION INTRAMUSCULAR; INTRAVENOUS; SUBCUTANEOUS at 21:00

## 2021-07-26 RX ADMIN — Medication 100 MILLIGRAM(S): at 13:13

## 2021-07-26 RX ADMIN — HYDROMORPHONE HYDROCHLORIDE 0.25 MILLIGRAM(S): 2 INJECTION INTRAMUSCULAR; INTRAVENOUS; SUBCUTANEOUS at 21:37

## 2021-07-26 RX ADMIN — Medication 6 MILLIGRAM(S): at 05:20

## 2021-07-26 RX ADMIN — PANTOPRAZOLE SODIUM 40 MILLIGRAM(S): 20 TABLET, DELAYED RELEASE ORAL at 05:21

## 2021-07-26 RX ADMIN — PREGABALIN 1000 MICROGRAM(S): 225 CAPSULE ORAL at 13:13

## 2021-07-26 RX ADMIN — HYDROMORPHONE HYDROCHLORIDE 0.25 MILLIGRAM(S): 2 INJECTION INTRAMUSCULAR; INTRAVENOUS; SUBCUTANEOUS at 21:25

## 2021-07-26 RX ADMIN — HYDROMORPHONE HYDROCHLORIDE 0.25 MILLIGRAM(S): 2 INJECTION INTRAMUSCULAR; INTRAVENOUS; SUBCUTANEOUS at 22:07

## 2021-07-26 NOTE — CHART NOTE - NSCHARTNOTEFT_GEN_A_CORE
CAPRINI SCORE [CLOT] Score on Admission for     AGE RELATED RISK FACTORS                                                       MOBILITY RELATED FACTORS  [ ] Age 41-60 years                                            (1 Point)                  [ ] Bed rest                                                        (1 Point)  [ ] Age: 61-74 years                                           (2 Points)                 [ ] Plaster cast                                                   (2 Points)  [ x] Age= 75 years                                              (3 Points)                 [ ] Bed bound for more than 72 hours                 (2 Points)    DISEASE RELATED RISK FACTORS                                               GENDER SPECIFIC FACTORS  [ ] Edema in the lower extremities                       (1 Point)                  [ ] Pregnancy                                                     (1 Point)  [ ] Varicose veins                                               (1 Point)                  [ ] Post-partum < 6 weeks                                   (1 Point)             [ x] BMI > 25 Kg/m2                                            (1 Point)                  [ ] Hormonal therapy  or oral contraception          (1 Point)                 [ ] Sepsis (in the previous month)                        (1 Point)            [ ] History of pregnancy complications                 (1 point)  [ ] Pneumonia or serious lung disease                                            [ ] Unexplained or recurrent                     (1 Point)           (in the previous month)                               (1 Point)  [ ] Abnormal pulmonary function test                     (1 Point)                 SURGERY RELATED RISK FACTORS (include planned surgeries)  [ ] Acute myocardial infarction                              (1 Point)                 [ ]  Section                                             (1 Point)  [ ] Congestive heart failure (in the previous month)  (1 Point)         [ ] Minor surgery                                                  (1 Point)   [ ] Inflammatory bowel disease                             (1 Point)                 [ ] Arthroscopic surgery                                        (2 Points)  [ ] Central venous access                                      (2 Points)                 [ ] General surgery lasting more than 45 minutes   (2 Points)       [ ] Stroke (in the previous month)                          (5 Points)               [ ] Elective arthroplasty                                         (5 Points)            [ ] current or past malignancy                              (2 Points)                                                                                                       HEMATOLOGY RELATED FACTORS                                                 TRAUMA RELATED RISK FACTORS  [ ] Prior episodes of VTE                                     (3 Points)                [ ] Fracture of the hip, pelvis, or leg                       (5 Points)  [ ] Positive family history for VTE                         (3 Points)             [ ] Acute spinal cord injury (in the previous month)  (5 Points)  [ ] Prothrombin 87236 A                                     (3 Points)                [ ] Paralysis  (less than 1 month)                             (5 Points)  [ ] Factor V Leiden                                             (3 Points)                  [ ] Multiple Trauma within 1 month                        (5 Points)  [ ] Lupus anticoagulants                                     (3 Points)                                                           [ ] Anticardiolipin antibodies                               (3 Points)                                                       [ ] High homocysteine in the blood                      (3 Points)                                             [ ] Other congenital or acquired thrombophilia      (3 Points)                                                [ ] Heparin induced thrombocytopenia                  (3 Points)                                          Total Score [   4       ]    Risk:  Very low 0   Low 1 to 2   Moderate 3 to 4   High =5       VTE Prophylaxis Recommendations:  [ x] mechanical pneumatic compression devices                                      [ ] contraindicated: _____________________  [ ] chemo prophylaxis                                                                                  [ x] contraindicated _____________________    **** HIGH LIKELIHOOD DVT PRESENT ON ADMISSION  [ ] (please order LE dopplers within 24 hours of admission)

## 2021-07-26 NOTE — PHYSICAL THERAPY INITIAL EVALUATION ADULT - LEVEL OF INDEPENDENCE: GAIT, REHAB EVAL
pt. requires max a to stand upright.,Unable to weight shift/unable to perform maximum assist (25% patients effort)

## 2021-07-26 NOTE — PHYSICAL THERAPY INITIAL EVALUATION ADULT - PERTINENT HX OF CURRENT PROBLEM, REHAB EVAL
80 yo M sent to ER for functional quadraparesis, unable to walk or use hands per wife for >2mo. 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 80 yo M sent to ER for functional quadraparesis, unable to walk or use hands per wife for >2mo. CT Head /C-spine 7/21: Multilevel degenerative changes. No acute fx. MRI head 7/22: neg. MRI Cervical 7/22: Spinal stenosis and cord compression related to a broad-based disc protrusion of uncertain acuity at C2-3 as detailed above. MRI Thoracic 7/22: Thoracic spondylosis with ventral DISH. Normal spinal canal contents. MRI Lumbar 7/23: Spondylosis resulting in mild spinal stenosis at L4-5 as above 80 yo M sent to ER for functional quadriparesis, unable to walk or use hands per wife for >2mo. CT Head /C-spine 7/21: Multilevel degenerative changes. No acute fx. MRI head 7/22: neg. MRI Cervical 7/22: Spinal stenosis and cord compression related to a broad-based disc protrusion of uncertain acuity at C2-3 as detailed above. MRI Thoracic 7/22: Thoracic spondylosis with ventral DISH. Normal spinal canal contents. MRI Lumbar 7/23: Spondylosis resulting in mild spinal stenosis at L4-5 as above

## 2021-07-26 NOTE — PROGRESS NOTE ADULT - SUBJECTIVE AND OBJECTIVE BOX
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 After the episode pt was able to ambulate using a cane and eventually regained normal strength. In october pt started experiencing tingling in the toes which eventually caused him to fall leading to Right sided hip fracture which was surgically repaired. Pt continued rehab after surgery and was able to ambulate using walker. In february 2021 pt received the 2nd dose of Pfizer vaccine and the tingling sensation in his toes started to get worse.  For the last 6 weeks pt has been bed bound as he has little to no strength in his lower extremities. Pt went to the clinic of Dr. Clarke where he was examined and told to come to the ED. He was brought in by his daughter and wife who were contacted to get most of the history as pt wasn't too clear on the timeline.        (25 Jul 2021 21:03)    SURGERY: Decompressive laminectomy with instrumented fusion of cervical spine by posterior approach        ICU Vital Signs Last 24 Hrs  T(C): 36.5 (26 Jul 2021 22:00), Max: 36.7 (26 Jul 2021 04:16)  T(F): 97.7 (26 Jul 2021 22:00), Max: 98 (26 Jul 2021 04:16)  HR: 79 (26 Jul 2021 22:00) (55 - 99)  BP: 113/62 (26 Jul 2021 21:45) (109/67 - 159/84)  BP(mean): 81 (26 Jul 2021 21:45) (75 - 106)  ABP: 110/62 (26 Jul 2021 22:00) (101/58 - 132/74)  ABP(mean): 81 (26 Jul 2021 22:00) (74 - 98)  RR: 16 (26 Jul 2021 22:00) (14 - 18)  SpO2: 99% (26 Jul 2021 22:00) (95% - 100%)     07-25 @ 07:01  -  07-26 @ 07:00  --------------------------------------------------------  IN: 400 mL / OUT: 300 mL / NET: 100 mL    07-26 @ 07:01  -  07-26 @ 22:24  --------------------------------------------------------  IN: 920 mL / OUT: 490 mL / NET: 430 mL                             13.1   8.95  )-----------( 171      ( 25 Jul 2021 20:50 )             40.5    07-25    137  |  102  |  31<H>  ----------------------------<  178<H>  4.3   |  23  |  1.03    Ca    9.4      25 Jul 2021 20:50    TPro  6.8  /  Alb  3.3  /  TBili  0.3  /  DBili  x   /  AST  23  /  ALT  15  /  AlkPhos  137<H>  07-25   ABG - ( 26 Jul 2021 18:25 )  pH, Arterial: 7.38  pH, Blood: x     /  pCO2: 42    /  pO2: 314   / HCO3: 24    / Base Excess: -.4   /  SaO2: 100                      PHYSICAL EXAM:    General: No Acute Distress     Neurological: Awake, alert oriented to person, place and time, Following Commands, PERRL, EOMI, Face Symmetrical, 4/5 in the UE, 5/5 in LE  Pulmonary: Clear to Auscultation, No Rales, No Rhonchi, No Wheezes     Cardiovascular: S1, S2, Regular Rate and Rhythm     Gastrointestinal: Soft, Nontender, Nondistended     Extremities: No calf tenderness     Incision:       MEDICATIONS:  Antibiotics:   ceFAZolin   IVPB 2000 milliGRAM(s) IV Intermittent every 8 hours     Neurological:   HYDROmorphone  Injectable 0.25 milliGRAM(s) IV Push every 10 minutes PRN  HYDROmorphone  Injectable 0.5 milliGRAM(s) IV Push every 30 minutes PRN  ondansetron Injectable 4 milliGRAM(s) IV Push once PRN  ondansetron Injectable 4 milliGRAM(s) IV Push once PRN  oxyCODONE    IR 5 milliGRAM(s) Oral every 4 hours PRN  oxyCODONE    IR 10 milliGRAM(s) Oral every 4 hours PRN    Cardiac:   furosemide    Tablet 20 milliGRAM(s) Oral daily  propranolol 10 milliGRAM(s) Oral daily    Pulm:    Heme:     Other:   cyanocobalamin 1000 MICROGram(s) Oral daily  dexAMETHasone  Injectable 6 milliGRAM(s) IV Push every 6 hours  dextrose 40% Gel 15 Gram(s) Oral once  dextrose 5%. 1000 milliLiter(s) IV Continuous <Continuous>  dextrose 5%. 1000 milliLiter(s) IV Continuous <Continuous>  dextrose 50% Injectable 25 Gram(s) IV Push once  dextrose 50% Injectable 12.5 Gram(s) IV Push once  dextrose 50% Injectable 25 Gram(s) IV Push once  glucagon  Injectable 1 milliGRAM(s) IntraMuscular once  insulin lispro (ADMELOG) corrective regimen sliding scale   SubCutaneous every 6 hours  insulin lispro (ADMELOG) corrective regimen sliding scale   SubCutaneous at bedtime  pantoprazole    Tablet 40 milliGRAM(s) Oral before breakfast  polyethylene glycol 3350 17 Gram(s) Oral daily  senna 2 Tablet(s) Oral at bedtime  sodium chloride 0.9%. 1000 milliLiter(s) IV Continuous <Continuous>  thiamine 100 milliGRAM(s) Oral daily       DEVICES: [] Restraints [] SORAIDA/HMV []LD [] ET tube [] Trach [] Chest Tube [] A-line [] Garcia [] NGT [] Rectal Tube       A/P:  Pt is a 80 yo male with cervical stenosis with cord compression  s/p Decompressive laminectomy with instrumented fusion of cervical spine by posterior approach, with loss of motors intra-op     Neuro: neuro checks q 1 hr  decadron for cord edema per NS   MAP goal > 85 mmhg for SC perfusion   monitor drain output   pain control   Respiratory:  RA, IS   CV: MAP goal > 85 mmhg   NE as needed   HTN on propranolol and lasix, hold lasix for 24 hr to keep MAP> 85 mmhg   Endocrine: target euglycemia   Heme/Onc:   cc, will get cbc now           DVT ppx: SCD, start chemoprophylaxis tomorrow if ok with NS    Renal: IVL   ID: [id number] ABX   GI:alcoholic/WALL cirrhosis c/b variceal bleed s/p banding (8/2018) and hepatic encephalopathy   on propranolol and lasix   Pancreatitis  2/2 choledocholithiasis s/p ERCP with stone extraction and plastic stent placement (11/2019, stent now removed)  continue pantoprazole for history of ulcer   Social/Family: updated at bedside  Discharge planning:     Code Status: [x] Full Code [] DNR [] DNI [] Goals of Care:   Disposition: [x] ICU [] Stroke Unit [] RCU []PCU []Floor [] Discharge Home     Patient at high risk for neurologic deterioration, critical care time, excluding procedures: 30 minutes

## 2021-07-26 NOTE — PRE-ANESTHESIA EVALUATION ADULT - NSANTHPMHFT_GEN_ALL_CORE
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 After the episode pt was able to ambulate using a cane and eventually regained normal strength. In october pt started experiencing tingling in the toes which eventually caused him to fall leading to Right sided hip fracture which was surgically repaired. Pt continued rehab after surgery and was able to ambulate using walker. In february 2021 pt received the 2nd dose of Pfizer vaccine and the tingling sensation in his toes started to get worse.  For the last 6 weeks pt has been bed bound as he has little to no strength in his lower extremities. Pt went to the clinic of Dr. Clarke where he was examined and told to come to the ED. He was brought in by his daughter and wife who were contacted to get most of the history as pt wasn't too clear on the timeline.    ERCP 1 mo ago

## 2021-07-26 NOTE — BRIEF OPERATIVE NOTE - NSICDXBRIEFPROCEDURE_GEN_ALL_CORE_FT
PROCEDURES:  Decompressive laminectomy with instrumented fusion of cervical spine by posterior approach 26-Jul-2021 20:30:09  Jimmie Daley

## 2021-07-26 NOTE — PHYSICAL THERAPY INITIAL EVALUATION ADULT - ADDITIONAL COMMENTS
Patient lives in pvt house with spouse, 2 steps to enter. bed room upstairs, but lives in living room. Pt unable to amb. since 2 months. Pt owns hospital bed, w/c, bed side commode. pt.'s family declined cezar lift following d/c from rehab. moves from bed/chair with 2 person assist. Per spouse, Patient lives in pvt house with spouse, 2 steps to enter. bed room upstairs, but lives in living room. Pt unable to amb. since ~2 months. Pt owns hospital bed, w/c, bed side commode. pt.'s family declined cezar lift following d/c from rehab. transfers from bed/chair with 2 person assist.

## 2021-07-27 DIAGNOSIS — M48.02 SPINAL STENOSIS, CERVICAL REGION: ICD-10-CM

## 2021-07-27 DIAGNOSIS — K26.9 DUODENAL ULCER, UNSPECIFIED AS ACUTE OR CHRONIC, WITHOUT HEMORRHAGE OR PERFORATION: ICD-10-CM

## 2021-07-27 DIAGNOSIS — K74.60 UNSPECIFIED CIRRHOSIS OF LIVER: ICD-10-CM

## 2021-07-27 LAB
APTT BLD: 26.1 SEC — LOW (ref 27.5–35.5)
GLUCOSE BLDC GLUCOMTR-MCNC: 118 MG/DL — HIGH (ref 70–99)
GLUCOSE BLDC GLUCOMTR-MCNC: 125 MG/DL — HIGH (ref 70–99)
GLUCOSE BLDC GLUCOMTR-MCNC: 135 MG/DL — HIGH (ref 70–99)
GLUCOSE BLDC GLUCOMTR-MCNC: 153 MG/DL — HIGH (ref 70–99)
HCT VFR BLD CALC: 32.7 % — LOW (ref 39–50)
HGB BLD-MCNC: 10.5 G/DL — LOW (ref 13–17)
HOMOCYSTEINE LEVEL: 16.7 UMOL/L — HIGH
INR BLD: 1.45 RATIO — HIGH (ref 0.88–1.16)
MCHC RBC-ENTMCNC: 26.3 PG — LOW (ref 27–34)
MCHC RBC-ENTMCNC: 32.1 GM/DL — SIGNIFICANT CHANGE UP (ref 32–36)
MCV RBC AUTO: 81.8 FL — SIGNIFICANT CHANGE UP (ref 80–100)
METHYLMALONIC ACID LEVEL: 219 NMOL/L — SIGNIFICANT CHANGE UP (ref 87–318)
NRBC # BLD: 0 /100 WBCS — SIGNIFICANT CHANGE UP (ref 0–0)
PLATELET # BLD AUTO: 115 K/UL — LOW (ref 150–400)
PROTHROM AB SERPL-ACNC: 17.1 SEC — HIGH (ref 10.6–13.6)
RBC # BLD: 4 M/UL — LOW (ref 4.2–5.8)
RBC # FLD: 16.2 % — HIGH (ref 10.3–14.5)
WBC # BLD: 9.37 K/UL — SIGNIFICANT CHANGE UP (ref 3.8–10.5)
WBC # FLD AUTO: 9.37 K/UL — SIGNIFICANT CHANGE UP (ref 3.8–10.5)

## 2021-07-27 PROCEDURE — 99223 1ST HOSP IP/OBS HIGH 75: CPT

## 2021-07-27 PROCEDURE — 99233 SBSQ HOSP IP/OBS HIGH 50: CPT

## 2021-07-27 PROCEDURE — 93970 EXTREMITY STUDY: CPT | Mod: 26

## 2021-07-27 RX ORDER — LACTULOSE 10 G/15ML
10 SOLUTION ORAL
Qty: 0 | Refills: 0 | DISCHARGE

## 2021-07-27 RX ORDER — DICLOFENAC SODIUM 75 MG/1
1 TABLET, DELAYED RELEASE ORAL
Qty: 0 | Refills: 0 | DISCHARGE

## 2021-07-27 RX ORDER — SODIUM CHLORIDE 9 MG/ML
1000 INJECTION INTRAMUSCULAR; INTRAVENOUS; SUBCUTANEOUS
Refills: 0 | Status: DISCONTINUED | OUTPATIENT
Start: 2021-07-27 | End: 2021-07-28

## 2021-07-27 RX ORDER — LACTULOSE 10 G/15ML
20 SOLUTION ORAL EVERY 8 HOURS
Refills: 0 | Status: DISCONTINUED | OUTPATIENT
Start: 2021-07-27 | End: 2021-07-28

## 2021-07-27 RX ORDER — DEXAMETHASONE 0.5 MG/5ML
4 ELIXIR ORAL EVERY 6 HOURS
Refills: 0 | Status: DISCONTINUED | OUTPATIENT
Start: 2021-07-27 | End: 2021-07-28

## 2021-07-27 RX ORDER — LACTULOSE 10 G/15ML
20 SOLUTION ORAL ONCE
Refills: 0 | Status: COMPLETED | OUTPATIENT
Start: 2021-07-27 | End: 2021-07-27

## 2021-07-27 RX ADMIN — Medication 100 MILLIGRAM(S): at 13:38

## 2021-07-27 RX ADMIN — OXYCODONE HYDROCHLORIDE 10 MILLIGRAM(S): 5 TABLET ORAL at 00:03

## 2021-07-27 RX ADMIN — Medication 20 MILLIGRAM(S): at 17:21

## 2021-07-27 RX ADMIN — OXYCODONE HYDROCHLORIDE 5 MILLIGRAM(S): 5 TABLET ORAL at 15:15

## 2021-07-27 RX ADMIN — OXYCODONE HYDROCHLORIDE 10 MILLIGRAM(S): 5 TABLET ORAL at 01:03

## 2021-07-27 RX ADMIN — Medication 6 MILLIGRAM(S): at 06:09

## 2021-07-27 RX ADMIN — Medication 100 MILLIGRAM(S): at 06:10

## 2021-07-27 RX ADMIN — OXYCODONE HYDROCHLORIDE 5 MILLIGRAM(S): 5 TABLET ORAL at 22:49

## 2021-07-27 RX ADMIN — SODIUM CHLORIDE 50 MILLILITER(S): 9 INJECTION INTRAMUSCULAR; INTRAVENOUS; SUBCUTANEOUS at 01:42

## 2021-07-27 RX ADMIN — PANTOPRAZOLE SODIUM 40 MILLIGRAM(S): 20 TABLET, DELAYED RELEASE ORAL at 06:13

## 2021-07-27 RX ADMIN — LACTULOSE 20 GRAM(S): 10 SOLUTION ORAL at 15:15

## 2021-07-27 RX ADMIN — Medication 2: at 22:34

## 2021-07-27 RX ADMIN — Medication 100 MILLIGRAM(S): at 11:40

## 2021-07-27 RX ADMIN — SODIUM CHLORIDE 50 MILLILITER(S): 9 INJECTION INTRAMUSCULAR; INTRAVENOUS; SUBCUTANEOUS at 21:49

## 2021-07-27 RX ADMIN — SENNA PLUS 2 TABLET(S): 8.6 TABLET ORAL at 21:49

## 2021-07-27 RX ADMIN — LACTULOSE 20 GRAM(S): 10 SOLUTION ORAL at 21:49

## 2021-07-27 RX ADMIN — POLYETHYLENE GLYCOL 3350 17 GRAM(S): 17 POWDER, FOR SOLUTION ORAL at 11:40

## 2021-07-27 RX ADMIN — OXYCODONE HYDROCHLORIDE 5 MILLIGRAM(S): 5 TABLET ORAL at 21:49

## 2021-07-27 RX ADMIN — PREGABALIN 1000 MICROGRAM(S): 225 CAPSULE ORAL at 11:45

## 2021-07-27 RX ADMIN — Medication 6 MILLIGRAM(S): at 00:03

## 2021-07-27 RX ADMIN — SODIUM CHLORIDE 50 MILLILITER(S): 9 INJECTION INTRAMUSCULAR; INTRAVENOUS; SUBCUTANEOUS at 21:41

## 2021-07-27 RX ADMIN — OXYCODONE HYDROCHLORIDE 10 MILLIGRAM(S): 5 TABLET ORAL at 06:11

## 2021-07-27 RX ADMIN — Medication 6 MILLIGRAM(S): at 11:46

## 2021-07-27 RX ADMIN — OXYCODONE HYDROCHLORIDE 5 MILLIGRAM(S): 5 TABLET ORAL at 15:45

## 2021-07-27 RX ADMIN — Medication 4 MILLIGRAM(S): at 17:21

## 2021-07-27 RX ADMIN — OXYCODONE HYDROCHLORIDE 10 MILLIGRAM(S): 5 TABLET ORAL at 04:45

## 2021-07-27 RX ADMIN — SODIUM CHLORIDE 50 MILLILITER(S): 9 INJECTION INTRAMUSCULAR; INTRAVENOUS; SUBCUTANEOUS at 17:35

## 2021-07-27 NOTE — PROGRESS NOTE ADULT - ASSESSMENT
A/P:  Pt is a 80 yo male with cervical stenosis with cord compression  s/p Decompressive laminectomy with instrumented fusion of cervical spine by posterior approach, with loss of motors intra-op about 40% on L side. This AM with stable clinical exam    Neuro: neuro checks per routine  decadron for cord edema per NS , 4 q6, monitor sugars  MAP goal > 85 mmhg for SC perfusion, currently maintaining good MAPs without pressors, lasix on hold   monitor drain output   pain control   Respiratory:  RA, IS   CV: MAP goal > 85 mmhg   NE as needed, remains off  HTN on propranolol and lasix, hold lasix for 24 hr to keep MAP> 85 mmhg   Endocrine: target euglycemia   Heme/Onc:   cc, CBC stable           DVT ppx: SCD, start chemoprophylaxis if ok with NS    Renal: IVL   ID: estrellita-op ABX   GI:alcoholic/WALL cirrhosis c/b variceal bleed s/p banding (8/2018) and hepatic encephalopathy   on propranolol and lasix. INR 1.4. Keep SCDs, monitor for bleeding. Does not need VKA.  Pancreatitis  2/2 choledocholithiasis s/p ERCP with stone extraction and plastic stent placement (11/2019, stent now removed)  continue pantoprazole for history of ulcer   Discharge planning:     Code Status: [x] Full Code [] DNR [] DNI [] Goals of Care:   Disposition: [] Stroke Unit [] RCU []PCU [X]Floor [] Discharge Home     Patient at high risk for neurologic deterioration, critical care time, excluding procedures: 30 minutes        A/P:  Pt is a 80 yo male with cervical stenosis with cord compression  s/p Decompressive laminectomy with instrumented fusion of cervical spine by posterior approach, with loss of motors intra-op about 40% on L side. This AM with stable clinical exam    Neuro: neuro checks per routine  decadron for cord edema per NS , 4 q6, monitor sugars  MAP goal > 85 mmhg for SC perfusion, currently maintaining good MAPs without pressors, lasix on hold   monitor drain output   pain control   Respiratory:  RA, IS   CV: MAP goal > 85 mmhg   NE as needed, remains off  HTN on propranolol and lasix, hold lasix for 24 hr to keep MAP> 85 mmhg   Endocrine: target euglycemia   Heme/Onc:   cc, CBC stable           DVT ppx: SCD, start chemoprophylaxis if ok with NS    Renal: IVL   ID: estrellita-op ABX   GI:alcoholic/WALL cirrhosis c/b variceal bleed s/p banding (8/2018) and hepatic encephalopathy   on propranolol and lasix. INR 1.4. Keep SCDs, monitor for bleeding. Does not need VKA.  Pancreatitis  2/2 choledocholithiasis s/p ERCP with stone extraction and plastic stent placement (11/2019, stent now removed)  continue pantoprazole for history of ulcer   Discharge planning:     Code Status: [x] Full Code [] DNR [] DNI [] Goals of Care:   Disposition: [] Stroke Unit [] RCU []PCU [X]Floor [] Discharge Home

## 2021-07-27 NOTE — PROGRESS NOTE ADULT - SUBJECTIVE AND OBJECTIVE BOX
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 After the episode pt was able to ambulate using a cane and eventually regained normal strength. In october pt started experiencing tingling in the toes which eventually caused him to fall leading to Right sided hip fracture which was surgically repaired. Pt continued rehab after surgery and was able to ambulate using walker. In february 2021 pt received the 2nd dose of Pfizer vaccine and the tingling sensation in his toes started to get worse.  For the last 6 weeks pt has been bed bound as he has little to no strength in his lower extremities. Pt went to the clinic of Dr. Clarke where he was examined and told to come to the ED. He was brought in by his daughter and wife who were contacted to get most of the history as pt wasn't too clear on the timeline.     SURGERY: Decompressive laminectomy with instrumented fusion of cervical spine by posterior approach    7/27: Pt is seen in PACU with ongoing generalized pain, particularly BUE on ROS but JACKSON. MAPs have been good. Wearing C collar. His lasix remains on hold.         ICU Vital Signs Last 24 Hrs  T(C): 36.5 (26 Jul 2021 22:00), Max: 36.7 (26 Jul 2021 04:16)  T(F): 97.7 (26 Jul 2021 22:00), Max: 98 (26 Jul 2021 04:16)  HR: 79 (26 Jul 2021 22:00) (55 - 99)  BP: 113/62 (26 Jul 2021 21:45) (109/67 - 159/84)  BP(mean): 81 (26 Jul 2021 21:45) (75 - 106)  ABP: 110/62 (26 Jul 2021 22:00) (101/58 - 132/74)  ABP(mean): 81 (26 Jul 2021 22:00) (74 - 98)  RR: 16 (26 Jul 2021 22:00) (14 - 18)  SpO2: 99% (26 Jul 2021 22:00) (95% - 100%)     07-25 @ 07:01  -  07-26 @ 07:00  --------------------------------------------------------  IN: 400 mL / OUT: 300 mL / NET: 100 mL    07-26 @ 07:01 - 07-26 @ 22:24  --------------------------------------------------------  IN: 920 mL / OUT: 490 mL / NET: 430 mL                             13.1   8.95  )-----------( 171      ( 25 Jul 2021 20:50 )             40.5    07-25    137  |  102  |  31<H>  ----------------------------<  178<H>  4.3   |  23  |  1.03    Ca    9.4      25 Jul 2021 20:50    TPro  6.8  /  Alb  3.3  /  TBili  0.3  /  DBili  x   /  AST  23  /  ALT  15  /  AlkPhos  137<H>  07-25   ABG - ( 26 Jul 2021 18:25 )  pH, Arterial: 7.38  pH, Blood: x     /  pCO2: 42    /  pO2: 314   / HCO3: 24    / Base Excess: -.4   /  SaO2: 100                      PHYSICAL EXAM:    General: No Acute Distress     Neurological: Awake, alert oriented to person, place and time, Following Commands, PERRL, EOMI, Face Symmetrical, 4/5 in the UE, 5/5 in LE  Wearing C collar in place  Pulmonary: Clear to Auscultation, No Rales, No Rhonchi, No Wheezes     Cardiovascular: S1, S2, Regular Rate and Rhythm     Gastrointestinal: Soft, Nontender, Nondistended     Extremities: No calf tenderness     Incision:       MEDICATIONS:  Antibiotics:   ceFAZolin   IVPB 2000 milliGRAM(s) IV Intermittent every 8 hours     Neurological:   HYDROmorphone  Injectable 0.25 milliGRAM(s) IV Push every 10 minutes PRN  HYDROmorphone  Injectable 0.5 milliGRAM(s) IV Push every 30 minutes PRN  ondansetron Injectable 4 milliGRAM(s) IV Push once PRN  ondansetron Injectable 4 milliGRAM(s) IV Push once PRN  oxyCODONE    IR 5 milliGRAM(s) Oral every 4 hours PRN  oxyCODONE    IR 10 milliGRAM(s) Oral every 4 hours PRN    Cardiac:   furosemide    Tablet 20 milliGRAM(s) Oral daily  propranolol 10 milliGRAM(s) Oral daily    Pulm:    Heme:     Other:   cyanocobalamin 1000 MICROGram(s) Oral daily  dexAMETHasone  Injectable 6 milliGRAM(s) IV Push every 6 hours  dextrose 40% Gel 15 Gram(s) Oral once  dextrose 5%. 1000 milliLiter(s) IV Continuous <Continuous>  dextrose 5%. 1000 milliLiter(s) IV Continuous <Continuous>  dextrose 50% Injectable 25 Gram(s) IV Push once  dextrose 50% Injectable 12.5 Gram(s) IV Push once  dextrose 50% Injectable 25 Gram(s) IV Push once  glucagon  Injectable 1 milliGRAM(s) IntraMuscular once  insulin lispro (ADMELOG) corrective regimen sliding scale   SubCutaneous every 6 hours  insulin lispro (ADMELOG) corrective regimen sliding scale   SubCutaneous at bedtime  pantoprazole    Tablet 40 milliGRAM(s) Oral before breakfast  polyethylene glycol 3350 17 Gram(s) Oral daily  senna 2 Tablet(s) Oral at bedtime  sodium chloride 0.9%. 1000 milliLiter(s) IV Continuous <Continuous>  thiamine 100 milliGRAM(s) Oral daily       DEVICES: [] Restraints [] SORAIDA/HMV []LD [] ET tube [] Trach [] Chest Tube [] A-line [] Garcia [] NGT [] Rectal Tube

## 2021-07-27 NOTE — PROGRESS NOTE ADULT - SUBJECTIVE AND OBJECTIVE BOX
Patient seen and examined at bedside.    --Anticoagulation--    T(C): 36.5 (07-26-21 @ 22:00), Max: 36.7 (07-26-21 @ 04:16)  HR: 85 (07-26-21 @ 23:45) (55 - 99)  BP: 108/67 (07-26-21 @ 23:45) (97/58 - 159/84)  RR: 16 (07-26-21 @ 23:45) (14 - 18)  SpO2: 100% (07-26-21 @ 23:45) (95% - 100%)  Wt(kg): --    Exam:  Awake, Ox1, BLUE 4/5, BLE 4/5, very pain limited, shaky UE movements

## 2021-07-27 NOTE — PROGRESS NOTE ADULT - ASSESSMENT
Patient seen and examined at bedside s/p C2-4 decompression and fusion.  -NSCU for MAP >85  -C-collar all times  -Ct post-op  -Xrays post-op

## 2021-07-27 NOTE — CONSULT NOTE ADULT - PROBLEM SELECTOR RECOMMENDATION 2
lactulose ATC and titrate to 2-3 BMs/day to prevent hepatic encephalopathy. d/c IVF and restart Lasix when ok from neurosurg standpoint.

## 2021-07-27 NOTE — CONSULT NOTE ADULT - ASSESSMENT
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

## 2021-07-27 NOTE — CONSULT NOTE ADULT - SUBJECTIVE AND OBJECTIVE BOX
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 After the episode pt was able to ambulate using a cane and eventually regained normal strength. In october pt started experiencing tingling in the toes which eventually caused him to fall leading to Right sided hip fracture which was surgically repaired. Pt continued rehab after surgery and was able to ambulate using walker. In february 2021 pt received the 2nd dose of Pfizer vaccine and the tingling sensation in his toes started to get worse.  For the last 6 weeks pt has been bed bound as he has little to no strength in his lower extremities. Pt went to the clinic of Dr. Clarke where he was examined and told to come to the ED. He was brought in by his daughter and wife who were contacted to get most of the history as pt wasn't too clear on the timeline.        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)    Pancreatitis  2/2 choledocholithiasis s/p ERCP with stone extraction and plastic stent placement (11/2019, stent now removed)    Hematochezia  2/2 colonic AVMs s/p cauterization and hemorrhoids (11/2019)    Melena  2/2 duodenal ulcers s/p argon plasma coagulation (4/2020)    H/O inguinal hernia repair    History of repair of hip fracture    History of hip replacement  10/2020        Review of Systems:   CONSTITUTIONAL: No fever, weight loss, or fatigue  EYES: No eye pain, visual disturbances, or discharge  ENMT:  No difficulty hearing, tinnitus, vertigo; No sinus or throat pain  NECK: No pain or stiffness  BREASTS: No pain, masses, or nipple discharge  RESPIRATORY: No cough, wheezing, chills or hemoptysis; No shortness of breath  CARDIOVASCULAR: No chest pain, palpitations, dizziness, or leg swelling  GASTROINTESTINAL: No abdominal or epigastric pain. No nausea, vomiting, or hematemesis; No diarrhea or constipation. No melena or hematochezia.  GENITOURINARY: No dysuria, frequency, hematuria, or incontinence  NEUROLOGICAL: No headaches, memory loss, loss of strength, numbness, or tremors  SKIN: No itching, burning, rashes, or lesions   LYMPH NODES: No enlarged glands  ENDOCRINE: No heat or cold intolerance; No hair loss  MUSCULOSKELETAL: No joint pain or swelling; No muscle, back, or extremity pain  PSYCHIATRIC: No depression, anxiety, mood swings, or difficulty sleeping  HEME/LYMPH: No easy bruising, or bleeding gums  ALLERY AND IMMUNOLOGIC: No hives or eczema    Allergies    No Known Allergies    Intolerances        Social History:     FAMILY HISTORY:  Family history of ovarian cancer (Sibling)        MEDICATIONS  (STANDING):  cyanocobalamin 1000 MICROGram(s) Oral daily  dexAMETHasone     Tablet 4 milliGRAM(s) Oral every 6 hours  dextrose 50% Injectable 25 Gram(s) IV Push once  dextrose 50% Injectable 12.5 Gram(s) IV Push once  dextrose 50% Injectable 25 Gram(s) IV Push once  furosemide    Tablet 20 milliGRAM(s) Oral daily  glucagon  Injectable 1 milliGRAM(s) IntraMuscular once  insulin lispro (ADMELOG) corrective regimen sliding scale   SubCutaneous Before meals and at bedtime  lactulose Syrup 20 Gram(s) Oral once  pantoprazole    Tablet 40 milliGRAM(s) Oral before breakfast  polyethylene glycol 3350 17 Gram(s) Oral daily  propranolol 10 milliGRAM(s) Oral daily  senna 2 Tablet(s) Oral at bedtime  sodium chloride 0.9%. 1000 milliLiter(s) (50 mL/Hr) IV Continuous <Continuous>  thiamine 100 milliGRAM(s) Oral daily    MEDICATIONS  (PRN):  oxyCODONE    IR 5 milliGRAM(s) Oral every 4 hours PRN Moderate Pain (4 - 6)  oxyCODONE    IR 10 milliGRAM(s) Oral every 4 hours PRN Severe Pain (7 - 10)      Vital Signs Last 24 Hrs  T(C): 36.3 (27 Jul 2021 12:48), Max: 36.5 (26 Jul 2021 22:00)  T(F): 97.4 (27 Jul 2021 12:48), Max: 97.7 (26 Jul 2021 22:00)  HR: 78 (27 Jul 2021 12:48) (65 - 99)  BP: 143/87 (27 Jul 2021 12:48) (97/58 - 143/87)  BP(mean): 85 (27 Jul 2021 12:00) (72 - 106)  RR: 18 (27 Jul 2021 12:48) (14 - 116)  SpO2: 100% (27 Jul 2021 12:48) (96% - 100%)  CAPILLARY BLOOD GLUCOSE      POCT Blood Glucose.: 118 mg/dL (27 Jul 2021 11:02)  POCT Blood Glucose.: 125 mg/dL (27 Jul 2021 06:46)  POCT Blood Glucose.: 133 mg/dL (26 Jul 2021 21:30)    I&O's Summary    26 Jul 2021 07:01  -  27 Jul 2021 07:00  --------------------------------------------------------  IN: 1470 mL / OUT: 1165 mL / NET: 305 mL    27 Jul 2021 07:01  -  27 Jul 2021 14:03  --------------------------------------------------------  IN: 600 mL / OUT: 400 mL / NET: 200 mL        PHYSICAL EXAM:  GENERAL: NAD, well-developed  HEAD:  Atraumatic, Normocephalic  EYES: EOMI, PERRLA, conjunctiva and sclera clear  NECK: Supple, No JVD  CHEST/LUNG: Clear to auscultation bilaterally; No wheeze  HEART: Regular rate and rhythm; No murmurs, rubs, or gallops  ABDOMEN: Soft, Nontender, Nondistended; Bowel sounds present  EXTREMITIES:  2+ Peripheral Pulses, No clubbing, cyanosis, or edema  PSYCH: AAOx3  NEUROLOGY: non-focal  SKIN: No rashes or lesions    LABS:                        10.5   9.37  )-----------( 115      ( 27 Jul 2021 05:23 )             32.7     07-26    142  |  105  |  27<H>  ----------------------------<  131<H>  3.9   |  24  |  0.82    Ca    8.6      26 Jul 2021 22:39  Phos  3.3     07-26  Mg     2.0     07-26    TPro  6.8  /  Alb  3.3  /  TBili  0.3  /  DBili  x   /  AST  23  /  ALT  15  /  AlkPhos  137<H>  07-25    PT/INR - ( 27 Jul 2021 07:48 )   PT: 17.1 sec;   INR: 1.45 ratio         PTT - ( 27 Jul 2021 07:48 )  PTT:26.1 sec          RADIOLOGY & ADDITIONAL TESTS:    Imaging Personally Reviewed:    Consultant(s) Notes Reviewed:      Care Discussed with Consultants/Other Providers:   Stanislva Murdock  Pager [phone]  Office [phone]    CC: s/p     HPI:  Pt is a 80 yo male with PMHx of liver cirrhosis(25 years ago - due to alcohol), 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 After the episode pt was able to ambulate using a cane and eventually regained normal strength. In october pt started experiencing tingling in the toes which eventually caused him to fall leading to Right sided hip fracture which was surgically repaired. Pt continued rehab after surgery and was able to ambulate using walker. In february 2021 pt received the 2nd dose of Pfizer vaccine and the tingling sensation in his toes started to get worse.  For the last 6 weeks pt has been bed bound as he has little to no strength in his lower extremities. Pt went to the clinic of Dr. Clarke where he was examined and told to come to the ED. He was brought in by his daughter and wife who were contacted to get most of the history as pt wasn't too clear on the timeline.        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)    Pancreatitis  2/2 choledocholithiasis s/p ERCP with stone extraction and plastic stent placement (11/2019, stent now removed)    Hematochezia  2/2 colonic AVMs s/p cauterization and hemorrhoids (11/2019)    Melena  2/2 duodenal ulcers s/p argon plasma coagulation (4/2020)    H/O inguinal hernia repair    History of repair of hip fracture    History of hip replacement  10/2020        Review of Systems:   CONSTITUTIONAL: No fever, weight loss, or fatigue  EYES: No eye pain, visual disturbances, or discharge  ENMT:  No difficulty hearing, tinnitus, vertigo; No sinus or throat pain  NECK: No pain or stiffness  BREASTS: No pain, masses, or nipple discharge  RESPIRATORY: No cough, wheezing, chills or hemoptysis; No shortness of breath  CARDIOVASCULAR: No chest pain, palpitations, dizziness, or leg swelling  GASTROINTESTINAL: No abdominal or epigastric pain. No nausea, vomiting, or hematemesis; No diarrhea or constipation. No melena or hematochezia.  GENITOURINARY: No dysuria, frequency, hematuria, or incontinence  NEUROLOGICAL: No headaches, memory loss, loss of strength, numbness, or tremors  SKIN: No itching, burning, rashes, or lesions   LYMPH NODES: No enlarged glands  ENDOCRINE: No heat or cold intolerance; No hair loss  MUSCULOSKELETAL: No joint pain or swelling; No muscle, back, or extremity pain  PSYCHIATRIC: No depression, anxiety, mood swings, or difficulty sleeping  HEME/LYMPH: No easy bruising, or bleeding gums  ALLERY AND IMMUNOLOGIC: No hives or eczema    Allergies    No Known Allergies    Intolerances        Social History:     FAMILY HISTORY:  Family history of ovarian cancer (Sibling)        MEDICATIONS  (STANDING):  cyanocobalamin 1000 MICROGram(s) Oral daily  dexAMETHasone     Tablet 4 milliGRAM(s) Oral every 6 hours  dextrose 50% Injectable 25 Gram(s) IV Push once  dextrose 50% Injectable 12.5 Gram(s) IV Push once  dextrose 50% Injectable 25 Gram(s) IV Push once  furosemide    Tablet 20 milliGRAM(s) Oral daily  glucagon  Injectable 1 milliGRAM(s) IntraMuscular once  insulin lispro (ADMELOG) corrective regimen sliding scale   SubCutaneous Before meals and at bedtime  lactulose Syrup 20 Gram(s) Oral once  pantoprazole    Tablet 40 milliGRAM(s) Oral before breakfast  polyethylene glycol 3350 17 Gram(s) Oral daily  propranolol 10 milliGRAM(s) Oral daily  senna 2 Tablet(s) Oral at bedtime  sodium chloride 0.9%. 1000 milliLiter(s) (50 mL/Hr) IV Continuous <Continuous>  thiamine 100 milliGRAM(s) Oral daily    MEDICATIONS  (PRN):  oxyCODONE    IR 5 milliGRAM(s) Oral every 4 hours PRN Moderate Pain (4 - 6)  oxyCODONE    IR 10 milliGRAM(s) Oral every 4 hours PRN Severe Pain (7 - 10)      Vital Signs Last 24 Hrs  T(C): 36.3 (27 Jul 2021 12:48), Max: 36.5 (26 Jul 2021 22:00)  T(F): 97.4 (27 Jul 2021 12:48), Max: 97.7 (26 Jul 2021 22:00)  HR: 78 (27 Jul 2021 12:48) (65 - 99)  BP: 143/87 (27 Jul 2021 12:48) (97/58 - 143/87)  BP(mean): 85 (27 Jul 2021 12:00) (72 - 106)  RR: 18 (27 Jul 2021 12:48) (14 - 116)  SpO2: 100% (27 Jul 2021 12:48) (96% - 100%)  CAPILLARY BLOOD GLUCOSE      POCT Blood Glucose.: 118 mg/dL (27 Jul 2021 11:02)  POCT Blood Glucose.: 125 mg/dL (27 Jul 2021 06:46)  POCT Blood Glucose.: 133 mg/dL (26 Jul 2021 21:30)    I&O's Summary    26 Jul 2021 07:01  -  27 Jul 2021 07:00  --------------------------------------------------------  IN: 1470 mL / OUT: 1165 mL / NET: 305 mL    27 Jul 2021 07:01  -  27 Jul 2021 14:03  --------------------------------------------------------  IN: 600 mL / OUT: 400 mL / NET: 200 mL        PHYSICAL EXAM:  GENERAL: NAD, well-developed  HEAD:  Atraumatic, Normocephalic  EYES: EOMI, PERRLA, conjunctiva and sclera clear  NECK: Supple, No JVD  CHEST/LUNG: Clear to auscultation bilaterally; No wheeze  HEART: Regular rate and rhythm; No murmurs, rubs, or gallops  ABDOMEN: Soft, Nontender, Nondistended; Bowel sounds present  EXTREMITIES:  2+ Peripheral Pulses, No clubbing, cyanosis, or edema  PSYCH: AAOx3  NEUROLOGY: non-focal  SKIN: No rashes or lesions    LABS:                        10.5   9.37  )-----------( 115      ( 27 Jul 2021 05:23 )             32.7     07-26    142  |  105  |  27<H>  ----------------------------<  131<H>  3.9   |  24  |  0.82    Ca    8.6      26 Jul 2021 22:39  Phos  3.3     07-26  Mg     2.0     07-26    TPro  6.8  /  Alb  3.3  /  TBili  0.3  /  DBili  x   /  AST  23  /  ALT  15  /  AlkPhos  137<H>  07-25    PT/INR - ( 27 Jul 2021 07:48 )   PT: 17.1 sec;   INR: 1.45 ratio         PTT - ( 27 Jul 2021 07:48 )  PTT:26.1 sec          RADIOLOGY & ADDITIONAL TESTS:    Imaging Personally Reviewed:    Consultant(s) Notes Reviewed:      Care Discussed with Consultants/Other Providers:   Stanislav Murdock  Pager [phone]  Office [phone]    CC: s/p C2-4 posterior decompression and fusion    HPI:  Pt is a 80 yo male with PMHx of liver cirrhosis(25 years ago - due to alcohol),  Guillain-[place] after flu vaccine c 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), variceal bleed s/p banding, melena from duodenal ulcers who presented to the ED with progressive lower extremity weakness and was found c  s/p C2-4 posterior decompression and fusion      PAST MEDICAL & SURGICAL HISTORY:  HTN (hypertension)    Guillain-[place] syndrome  1996 after flu vaccine    Liver cirrhosis  alcoholic/WALL cirrhosis c/b variceal bleed s/p banding (8/2018) and hepatic encephalopathy (several years ago)    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)    Pancreatitis  2/2 choledocholithiasis s/p ERCP with stone extraction and plastic stent placement (11/2019, stent now removed)    Cholangitis s/p ERCP 6/2021    Hematochezia  2/2 colonic AVMs s/p cauterization and hemorrhoids (11/2019)    Melena  2/2 duodenal ulcers s/p argon plasma coagulation (4/2020)    H/O inguinal hernia repair    History of repair of hip fracture    History of hip replacement  10/2020        Review of Systems:   CONSTITUTIONAL: No fever, weight loss, or fatigue  EYES: No eye pain, visual disturbances, or discharge  ENMT:  No difficulty hearing, tinnitus, vertigo; No sinus or throat pain  NECK: No pain or stiffness  BREASTS: No pain, masses, or nipple discharge  RESPIRATORY: No cough, wheezing, chills or hemoptysis; No shortness of breath  CARDIOVASCULAR: No chest pain, palpitations, dizziness, or leg swelling  GASTROINTESTINAL: No abdominal or epigastric pain. No nausea, vomiting, or hematemesis; No diarrhea or constipation. No melena or hematochezia.  GENITOURINARY: No dysuria, frequency, hematuria, or incontinence  NEUROLOGICAL: No headaches, memory loss, loss of strength, numbness, or tremors  SKIN: No itching, burning, rashes, or lesions   LYMPH NODES: No enlarged glands  ENDOCRINE: No heat or cold intolerance; No hair loss  MUSCULOSKELETAL: No joint pain or swelling; No muscle, back, or extremity pain  PSYCHIATRIC: No depression, anxiety, mood swings, or difficulty sleeping  HEME/LYMPH: No easy bruising, or bleeding gums  ALLERY AND IMMUNOLOGIC: No hives or eczema    Allergies    No Known Allergies    Intolerances        Social History:     FAMILY HISTORY:  Family history of ovarian cancer (Sibling)        MEDICATIONS  (STANDING):  cyanocobalamin 1000 MICROGram(s) Oral daily  dexAMETHasone     Tablet 4 milliGRAM(s) Oral every 6 hours  dextrose 50% Injectable 25 Gram(s) IV Push once  dextrose 50% Injectable 12.5 Gram(s) IV Push once  dextrose 50% Injectable 25 Gram(s) IV Push once  furosemide    Tablet 20 milliGRAM(s) Oral daily  glucagon  Injectable 1 milliGRAM(s) IntraMuscular once  insulin lispro (ADMELOG) corrective regimen sliding scale   SubCutaneous Before meals and at bedtime  lactulose Syrup 20 Gram(s) Oral once  pantoprazole    Tablet 40 milliGRAM(s) Oral before breakfast  polyethylene glycol 3350 17 Gram(s) Oral daily  propranolol 10 milliGRAM(s) Oral daily  senna 2 Tablet(s) Oral at bedtime  sodium chloride 0.9%. 1000 milliLiter(s) (50 mL/Hr) IV Continuous <Continuous>  thiamine 100 milliGRAM(s) Oral daily    MEDICATIONS  (PRN):  oxyCODONE    IR 5 milliGRAM(s) Oral every 4 hours PRN Moderate Pain (4 - 6)  oxyCODONE    IR 10 milliGRAM(s) Oral every 4 hours PRN Severe Pain (7 - 10)      Vital Signs Last 24 Hrs  T(C): 36.3 (27 Jul 2021 12:48), Max: 36.5 (26 Jul 2021 22:00)  T(F): 97.4 (27 Jul 2021 12:48), Max: 97.7 (26 Jul 2021 22:00)  HR: 78 (27 Jul 2021 12:48) (65 - 99)  BP: 143/87 (27 Jul 2021 12:48) (97/58 - 143/87)  BP(mean): 85 (27 Jul 2021 12:00) (72 - 106)  RR: 18 (27 Jul 2021 12:48) (14 - 116)  SpO2: 100% (27 Jul 2021 12:48) (96% - 100%)  CAPILLARY BLOOD GLUCOSE      POCT Blood Glucose.: 118 mg/dL (27 Jul 2021 11:02)  POCT Blood Glucose.: 125 mg/dL (27 Jul 2021 06:46)  POCT Blood Glucose.: 133 mg/dL (26 Jul 2021 21:30)    I&O's Summary    26 Jul 2021 07:01  -  27 Jul 2021 07:00  --------------------------------------------------------  IN: 1470 mL / OUT: 1165 mL / NET: 305 mL    27 Jul 2021 07:01  -  27 Jul 2021 14:03  --------------------------------------------------------  IN: 600 mL / OUT: 400 mL / NET: 200 mL        PHYSICAL EXAM:  GENERAL: NAD, well-developed  HEAD:  Atraumatic, Normocephalic  EYES: EOMI, PERRLA, conjunctiva and sclera clear  NECK: Supple, No JVD  CHEST/LUNG: Clear to auscultation bilaterally; No wheeze  HEART: Regular rate and rhythm; No murmurs, rubs, or gallops  ABDOMEN: Soft, Nontender, Nondistended; Bowel sounds present  EXTREMITIES:  2+ Peripheral Pulses, No clubbing, cyanosis, or edema  PSYCH: AAOx3  NEUROLOGY: non-focal  SKIN: No rashes or lesions    LABS:                        10.5   9.37  )-----------( 115      ( 27 Jul 2021 05:23 )             32.7     07-26    142  |  105  |  27<H>  ----------------------------<  131<H>  3.9   |  24  |  0.82    Ca    8.6      26 Jul 2021 22:39  Phos  3.3     07-26  Mg     2.0     07-26    TPro  6.8  /  Alb  3.3  /  TBili  0.3  /  DBili  x   /  AST  23  /  ALT  15  /  AlkPhos  137<H>  07-25    PT/INR - ( 27 Jul 2021 07:48 )   PT: 17.1 sec;   INR: 1.45 ratio         PTT - ( 27 Jul 2021 07:48 )  PTT:26.1 sec          RADIOLOGY & ADDITIONAL TESTS:    Imaging Personally Reviewed:    Consultant(s) Notes Reviewed:      Care Discussed with Consultants/Other Providers:   Stanislav Murdock  Pager [phone]  Office [phone]    CC: s/p C2-4 posterior decompression and fusion. OSH course reviewed.    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)    Pancreatitis  2/2 choledocholithiasis s/p ERCP with stone extraction and plastic stent placement (11/2019, stent now removed)    Cholangitis s/p ERCP 6/2021    Hematochezia  2/2 colonic AVMs s/p cauterization and hemorrhoids (11/2019)    Melena  2/2 duodenal ulcers s/p argon plasma coagulation (4/2020)    H/O inguinal hernia repair    History of repair of hip fracture    History of hip replacement  10/2020        Review of Systems:   CONSTITUTIONAL: No fever, weight loss, or fatigue  EYES: No eye pain, visual disturbances, or discharge  ENMT:  No difficulty hearing, tinnitus, vertigo; No sinus or throat pain  NECK: No pain or stiffness  BREASTS: No pain, masses, or nipple discharge  RESPIRATORY: No cough, wheezing, chills or hemoptysis; No shortness of breath  CARDIOVASCULAR: No chest pain, palpitations, dizziness, or leg swelling  GASTROINTESTINAL: No abdominal or epigastric pain. No nausea, vomiting, or hematemesis; No diarrhea or constipation. No melena or hematochezia.  GENITOURINARY: No dysuria, frequency, hematuria, or incontinence  NEUROLOGICAL: per HPI  SKIN: No itching, burning, rashes, or lesions   LYMPH NODES: No enlarged glands  ENDOCRINE: No heat or cold intolerance; No hair loss  MUSCULOSKELETAL: No joint pain or swelling; No muscle, back, or extremity pain  PSYCHIATRIC: No depression, anxiety, mood swings, or difficulty sleeping  HEME/LYMPH: No easy bruising, or bleeding gums  ALLERY AND IMMUNOLOGIC: No hives or eczema    Allergies    No Known Allergies    Social History:   Quit ETOH 4 yrs ago. No smoking or illegal drug use    FAMILY HISTORY:  Brother and Sister c Diabetes  Father had asthma        MEDICATIONS  (STANDING):  cyanocobalamin 1000 MICROGram(s) Oral daily  dexAMETHasone     Tablet 4 milliGRAM(s) Oral every 6 hours  dextrose 50% Injectable 25 Gram(s) IV Push once  dextrose 50% Injectable 12.5 Gram(s) IV Push once  dextrose 50% Injectable 25 Gram(s) IV Push once  furosemide    Tablet 20 milliGRAM(s) Oral daily  glucagon  Injectable 1 milliGRAM(s) IntraMuscular once  insulin lispro (ADMELOG) corrective regimen sliding scale   SubCutaneous Before meals and at bedtime  lactulose Syrup 20 Gram(s) Oral once  pantoprazole    Tablet 40 milliGRAM(s) Oral before breakfast  polyethylene glycol 3350 17 Gram(s) Oral daily  propranolol 10 milliGRAM(s) Oral daily  senna 2 Tablet(s) Oral at bedtime  sodium chloride 0.9%. 1000 milliLiter(s) (50 mL/Hr) IV Continuous <Continuous>  thiamine 100 milliGRAM(s) Oral daily    MEDICATIONS  (PRN):  oxyCODONE    IR 5 milliGRAM(s) Oral every 4 hours PRN Moderate Pain (4 - 6)  oxyCODONE    IR 10 milliGRAM(s) Oral every 4 hours PRN Severe Pain (7 - 10)      Vital Signs Last 24 Hrs  T(C): 36.3 (27 Jul 2021 12:48), Max: 36.5 (26 Jul 2021 22:00)  T(F): 97.4 (27 Jul 2021 12:48), Max: 97.7 (26 Jul 2021 22:00)  HR: 78 (27 Jul 2021 12:48) (65 - 99)  BP: 143/87 (27 Jul 2021 12:48) (97/58 - 143/87)  BP(mean): 85 (27 Jul 2021 12:00) (72 - 106)  RR: 18 (27 Jul 2021 12:48) (14 - 116)  SpO2: 100% (27 Jul 2021 12:48) (96% - 100%)  CAPILLARY BLOOD GLUCOSE      POCT Blood Glucose.: 118 mg/dL (27 Jul 2021 11:02)  POCT Blood Glucose.: 125 mg/dL (27 Jul 2021 06:46)  POCT Blood Glucose.: 133 mg/dL (26 Jul 2021 21:30)    I&O's Summary    26 Jul 2021 07:01  -  27 Jul 2021 07:00  --------------------------------------------------------  IN: 1470 mL / OUT: 1165 mL / NET: 305 mL    27 Jul 2021 07:01  -  27 Jul 2021 14:03  --------------------------------------------------------  IN: 600 mL / OUT: 400 mL / NET: 200 mL        PHYSICAL EXAM:  GENERAL: NAD, well-developed  HEAD:  Atraumatic, Normocephalic  EYES: EOMI, PERRLA, conjunctiva and sclera clear  NECK: Supple, No JVD  CHEST/LUNG: Clear to auscultation bilaterally; No wheeze  HEART: Regular rate and rhythm; No murmurs, rubs, or gallops; s1, s2+  ABDOMEN: Soft, Nontender, Nondistended; Bowel sounds present  EXTREMITIES:  2+ Peripheral Pulses, No clubbing, cyanosis, or edema  PSYCH: AAOx3  NEUROLOGY: 4/5 RUE; 4+/5 elsewhere  SKIN: No rashes     LABS:                        10.5   9.37  )-----------( 115      ( 27 Jul 2021 05:23 )             32.7     07-26    142  |  105  |  27<H>  ----------------------------<  131<H>  3.9   |  24  |  0.82    Ca    8.6      26 Jul 2021 22:39  Phos  3.3     07-26  Mg     2.0     07-26    TPro  6.8  /  Alb  3.3  /  TBili  0.3  /  DBili  x   /  AST  23  /  ALT  15  /  AlkPhos  137<H>  07-25    PT/INR - ( 27 Jul 2021 07:48 )   PT: 17.1 sec;   INR: 1.45 ratio         PTT - ( 27 Jul 2021 07:48 )  PTT:26.1 sec          RADIOLOGY & ADDITIONAL TESTS:    Imaging Personally Reviewed: MRI spine    Consultant(s) Notes Reviewed:      Care Discussed with Consultants/Other Providers: neurosurg   Stanislav Murdock  Pager [phone]  Office [phone]    CC: s/p C2-4 posterior decompression and fusion. OSH course reviewed.    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 pt denies any complaints after the surgery and reports improved strength in b/l UE/LE. No cp/sob. no f/c/r. No n/v/d/abd pain. No BM.       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)    Pancreatitis  2/2 choledocholithiasis s/p ERCP with stone extraction and plastic stent placement (11/2019, stent now removed)    Cholangitis s/p ERCP 6/2021    Hematochezia  2/2 colonic AVMs s/p cauterization and hemorrhoids (11/2019)    Melena  2/2 duodenal ulcers s/p argon plasma coagulation (4/2020)    H/O inguinal hernia repair    History of repair of hip fracture    History of hip replacement  10/2020        Review of Systems:   CONSTITUTIONAL: No fever, weight loss, or fatigue  EYES: No eye pain, visual disturbances, or discharge  ENMT:  No difficulty hearing, tinnitus, vertigo; No sinus or throat pain  NECK: No pain or stiffness  BREASTS: No pain, masses, or nipple discharge  RESPIRATORY: No cough, wheezing, chills or hemoptysis; No shortness of breath  CARDIOVASCULAR: No chest pain, palpitations, dizziness, or leg swelling  GASTROINTESTINAL: No abdominal or epigastric pain. No nausea, vomiting, or hematemesis; No diarrhea or constipation. No melena or hematochezia.  GENITOURINARY: No dysuria, frequency, hematuria, or incontinence  NEUROLOGICAL: per HPI  SKIN: No itching, burning, rashes, or lesions   LYMPH NODES: No enlarged glands  ENDOCRINE: No heat or cold intolerance; No hair loss  MUSCULOSKELETAL: No joint pain or swelling; No muscle, back, or extremity pain  PSYCHIATRIC: No depression, anxiety, mood swings, or difficulty sleeping  HEME/LYMPH: No easy bruising, or bleeding gums  ALLERY AND IMMUNOLOGIC: No hives or eczema    Allergies    No Known Allergies    Social History:   Quit ETOH 4 yrs ago. No smoking or illegal drug use    FAMILY HISTORY:  Brother and Sister c Diabetes  Father had asthma        MEDICATIONS  (STANDING):  cyanocobalamin 1000 MICROGram(s) Oral daily  dexAMETHasone     Tablet 4 milliGRAM(s) Oral every 6 hours  dextrose 50% Injectable 25 Gram(s) IV Push once  dextrose 50% Injectable 12.5 Gram(s) IV Push once  dextrose 50% Injectable 25 Gram(s) IV Push once  furosemide    Tablet 20 milliGRAM(s) Oral daily  glucagon  Injectable 1 milliGRAM(s) IntraMuscular once  insulin lispro (ADMELOG) corrective regimen sliding scale   SubCutaneous Before meals and at bedtime  lactulose Syrup 20 Gram(s) Oral once  pantoprazole    Tablet 40 milliGRAM(s) Oral before breakfast  polyethylene glycol 3350 17 Gram(s) Oral daily  propranolol 10 milliGRAM(s) Oral daily  senna 2 Tablet(s) Oral at bedtime  sodium chloride 0.9%. 1000 milliLiter(s) (50 mL/Hr) IV Continuous <Continuous>  thiamine 100 milliGRAM(s) Oral daily    MEDICATIONS  (PRN):  oxyCODONE    IR 5 milliGRAM(s) Oral every 4 hours PRN Moderate Pain (4 - 6)  oxyCODONE    IR 10 milliGRAM(s) Oral every 4 hours PRN Severe Pain (7 - 10)      Vital Signs Last 24 Hrs  T(C): 36.3 (27 Jul 2021 12:48), Max: 36.5 (26 Jul 2021 22:00)  T(F): 97.4 (27 Jul 2021 12:48), Max: 97.7 (26 Jul 2021 22:00)  HR: 78 (27 Jul 2021 12:48) (65 - 99)  BP: 143/87 (27 Jul 2021 12:48) (97/58 - 143/87)  BP(mean): 85 (27 Jul 2021 12:00) (72 - 106)  RR: 18 (27 Jul 2021 12:48) (14 - 116)  SpO2: 100% (27 Jul 2021 12:48) (96% - 100%)  CAPILLARY BLOOD GLUCOSE      POCT Blood Glucose.: 118 mg/dL (27 Jul 2021 11:02)  POCT Blood Glucose.: 125 mg/dL (27 Jul 2021 06:46)  POCT Blood Glucose.: 133 mg/dL (26 Jul 2021 21:30)    I&O's Summary    26 Jul 2021 07:01  -  27 Jul 2021 07:00  --------------------------------------------------------  IN: 1470 mL / OUT: 1165 mL / NET: 305 mL    27 Jul 2021 07:01  -  27 Jul 2021 14:03  --------------------------------------------------------  IN: 600 mL / OUT: 400 mL / NET: 200 mL        PHYSICAL EXAM:  GENERAL: NAD, well-developed  HEAD:  Atraumatic, Normocephalic  EYES: EOMI, PERRLA, conjunctiva and sclera clear  NECK: Supple, No JVD  CHEST/LUNG: Clear to auscultation bilaterally; No wheeze  HEART: Regular rate and rhythm; No murmurs, rubs, or gallops; s1, s2+  ABDOMEN: Soft, Nontender, Nondistended; Bowel sounds present  EXTREMITIES:  2+ Peripheral Pulses, No clubbing, cyanosis, or edema  PSYCH: AAOx3  NEUROLOGY: 4/5 RUE; 4+/5 elsewhere  SKIN: No rashes     LABS:                        10.5   9.37  )-----------( 115      ( 27 Jul 2021 05:23 )             32.7     07-26    142  |  105  |  27<H>  ----------------------------<  131<H>  3.9   |  24  |  0.82    Ca    8.6      26 Jul 2021 22:39  Phos  3.3     07-26  Mg     2.0     07-26    TPro  6.8  /  Alb  3.3  /  TBili  0.3  /  DBili  x   /  AST  23  /  ALT  15  /  AlkPhos  137<H>  07-25    PT/INR - ( 27 Jul 2021 07:48 )   PT: 17.1 sec;   INR: 1.45 ratio         PTT - ( 27 Jul 2021 07:48 )  PTT:26.1 sec          RADIOLOGY & ADDITIONAL TESTS:    Imaging Personally Reviewed: MRI spine    Consultant(s) Notes Reviewed:      Care Discussed with Consultants/Other Providers: neurosurg

## 2021-07-27 NOTE — CONSULT NOTE ADULT - PROBLEM SELECTOR RECOMMENDATION 9
s/p C2-4 posterior decompression and fusion 7/27/21. PT. pharmacologic DVT prophylaxis when ok from neurosurgical standpoint.

## 2021-07-28 LAB
ALBUMIN SERPL ELPH-MCNC: 2.4 G/DL — LOW (ref 3.3–5)
ALP SERPL-CCNC: 98 U/L — SIGNIFICANT CHANGE UP (ref 40–120)
ALT FLD-CCNC: 6 U/L — LOW (ref 10–45)
ANION GAP SERPL CALC-SCNC: 11 MMOL/L — SIGNIFICANT CHANGE UP (ref 5–17)
APTT BLD: 26.3 SEC — LOW (ref 27.5–35.5)
AST SERPL-CCNC: 20 U/L — SIGNIFICANT CHANGE UP (ref 10–40)
BILIRUB SERPL-MCNC: 0.3 MG/DL — SIGNIFICANT CHANGE UP (ref 0.2–1.2)
BUN SERPL-MCNC: 30 MG/DL — HIGH (ref 7–23)
CALCIUM SERPL-MCNC: 8.7 MG/DL — SIGNIFICANT CHANGE UP (ref 8.4–10.5)
CHLORIDE SERPL-SCNC: 108 MMOL/L — SIGNIFICANT CHANGE UP (ref 96–108)
CO2 SERPL-SCNC: 17 MMOL/L — LOW (ref 22–31)
CREAT SERPL-MCNC: 0.93 MG/DL — SIGNIFICANT CHANGE UP (ref 0.5–1.3)
GLUCOSE BLDC GLUCOMTR-MCNC: 110 MG/DL — HIGH (ref 70–99)
GLUCOSE BLDC GLUCOMTR-MCNC: 111 MG/DL — HIGH (ref 70–99)
GLUCOSE BLDC GLUCOMTR-MCNC: 135 MG/DL — HIGH (ref 70–99)
GLUCOSE BLDC GLUCOMTR-MCNC: 92 MG/DL — SIGNIFICANT CHANGE UP (ref 70–99)
GLUCOSE SERPL-MCNC: 117 MG/DL — HIGH (ref 70–99)
HCT VFR BLD CALC: 34 % — LOW (ref 39–50)
HGB BLD-MCNC: 10.7 G/DL — LOW (ref 13–17)
INR BLD: 1.43 RATIO — HIGH (ref 0.88–1.16)
MCHC RBC-ENTMCNC: 26 PG — LOW (ref 27–34)
MCHC RBC-ENTMCNC: 31.5 GM/DL — LOW (ref 32–36)
MCV RBC AUTO: 82.7 FL — SIGNIFICANT CHANGE UP (ref 80–100)
NRBC # BLD: 0 /100 WBCS — SIGNIFICANT CHANGE UP (ref 0–0)
PLATELET # BLD AUTO: 116 K/UL — LOW (ref 150–400)
POTASSIUM SERPL-MCNC: 4.3 MMOL/L — SIGNIFICANT CHANGE UP (ref 3.5–5.3)
POTASSIUM SERPL-SCNC: 4.3 MMOL/L — SIGNIFICANT CHANGE UP (ref 3.5–5.3)
PROT SERPL-MCNC: 5.4 G/DL — LOW (ref 6–8.3)
PROTHROM AB SERPL-ACNC: 16.9 SEC — HIGH (ref 10.6–13.6)
RBC # BLD: 4.11 M/UL — LOW (ref 4.2–5.8)
RBC # FLD: 16.6 % — HIGH (ref 10.3–14.5)
SARS-COV-2 RNA SPEC QL NAA+PROBE: SIGNIFICANT CHANGE UP
SODIUM SERPL-SCNC: 136 MMOL/L — SIGNIFICANT CHANGE UP (ref 135–145)
WBC # BLD: 7.55 K/UL — SIGNIFICANT CHANGE UP (ref 3.8–10.5)
WBC # FLD AUTO: 7.55 K/UL — SIGNIFICANT CHANGE UP (ref 3.8–10.5)

## 2021-07-28 PROCEDURE — 99232 SBSQ HOSP IP/OBS MODERATE 35: CPT

## 2021-07-28 PROCEDURE — 72050 X-RAY EXAM NECK SPINE 4/5VWS: CPT | Mod: 26

## 2021-07-28 RX ORDER — FUROSEMIDE 40 MG
20 TABLET ORAL ONCE
Refills: 0 | Status: COMPLETED | OUTPATIENT
Start: 2021-07-28 | End: 2021-07-28

## 2021-07-28 RX ORDER — ENOXAPARIN SODIUM 100 MG/ML
40 INJECTION SUBCUTANEOUS DAILY
Refills: 0 | Status: DISCONTINUED | OUTPATIENT
Start: 2021-07-28 | End: 2021-08-03

## 2021-07-28 RX ORDER — LACTULOSE 10 G/15ML
20 SOLUTION ORAL EVERY 6 HOURS
Refills: 0 | Status: DISCONTINUED | OUTPATIENT
Start: 2021-07-28 | End: 2021-08-03

## 2021-07-28 RX ORDER — POLYETHYLENE GLYCOL 3350 17 G/17G
17 POWDER, FOR SOLUTION ORAL
Refills: 0 | Status: DISCONTINUED | OUTPATIENT
Start: 2021-07-28 | End: 2021-08-03

## 2021-07-28 RX ORDER — DEXAMETHASONE 0.5 MG/5ML
3 ELIXIR ORAL EVERY 6 HOURS
Refills: 0 | Status: DISCONTINUED | OUTPATIENT
Start: 2021-07-28 | End: 2021-07-30

## 2021-07-28 RX ADMIN — Medication 4 MILLIGRAM(S): at 05:31

## 2021-07-28 RX ADMIN — PREGABALIN 1000 MICROGRAM(S): 225 CAPSULE ORAL at 13:34

## 2021-07-28 RX ADMIN — SENNA PLUS 2 TABLET(S): 8.6 TABLET ORAL at 21:09

## 2021-07-28 RX ADMIN — OXYCODONE HYDROCHLORIDE 5 MILLIGRAM(S): 5 TABLET ORAL at 09:51

## 2021-07-28 RX ADMIN — Medication 3 MILLIGRAM(S): at 13:43

## 2021-07-28 RX ADMIN — POLYETHYLENE GLYCOL 3350 17 GRAM(S): 17 POWDER, FOR SOLUTION ORAL at 17:22

## 2021-07-28 RX ADMIN — Medication 3 MILLIGRAM(S): at 23:08

## 2021-07-28 RX ADMIN — LACTULOSE 20 GRAM(S): 10 SOLUTION ORAL at 13:34

## 2021-07-28 RX ADMIN — LACTULOSE 20 GRAM(S): 10 SOLUTION ORAL at 05:31

## 2021-07-28 RX ADMIN — Medication 3 MILLIGRAM(S): at 17:22

## 2021-07-28 RX ADMIN — LACTULOSE 20 GRAM(S): 10 SOLUTION ORAL at 23:08

## 2021-07-28 RX ADMIN — PANTOPRAZOLE SODIUM 40 MILLIGRAM(S): 20 TABLET, DELAYED RELEASE ORAL at 05:31

## 2021-07-28 RX ADMIN — SODIUM CHLORIDE 50 MILLILITER(S): 9 INJECTION INTRAMUSCULAR; INTRAVENOUS; SUBCUTANEOUS at 09:21

## 2021-07-28 RX ADMIN — ENOXAPARIN SODIUM 40 MILLIGRAM(S): 100 INJECTION SUBCUTANEOUS at 18:46

## 2021-07-28 RX ADMIN — OXYCODONE HYDROCHLORIDE 5 MILLIGRAM(S): 5 TABLET ORAL at 09:21

## 2021-07-28 RX ADMIN — Medication 20 MILLIGRAM(S): at 17:26

## 2021-07-28 RX ADMIN — LACTULOSE 20 GRAM(S): 10 SOLUTION ORAL at 17:22

## 2021-07-28 RX ADMIN — Medication 4 MILLIGRAM(S): at 02:22

## 2021-07-28 RX ADMIN — Medication 20 MILLIGRAM(S): at 05:31

## 2021-07-28 RX ADMIN — Medication 100 MILLIGRAM(S): at 13:33

## 2021-07-28 NOTE — OCCUPATIONAL THERAPY INITIAL EVALUATION ADULT - PHYSICAL ASSIST/NONPHYSICAL ASSIST:DRESS LOWER BODY, OT EVAL
[Spouse] : spouse [FreeTextEntry1] : fasting blood work and general follow-up\par  [de-identified] : Patient is a 71 year old female with a past medical history as below who presents for fasting blood work and general follow-up. Patient states she is taking all medications as prescribed and denies any adverse reactions or side effects. She denies any new arthralgias or myalgias on Atorvastatin Calcium. GERD is well-managed on Dexilant. Patient was started on Propranolol by cardiologist, Dr. Wilhelm for infrequent elevated pulse rate when seated (once every 3-4 months). Patient has been seeing liver specialist, Dr. Connolly given history of chronic hepatitis C. She had been on Sovaldi/Olysio in the past. Patient requests CRP (cardiac and serum) be checked with blood work today.  verbal cues/nonverbal cues (demo/gestures)/1 person assist

## 2021-07-28 NOTE — PROGRESS NOTE ADULT - SUBJECTIVE AND OBJECTIVE BOX
SUBJECTIVE: Doing well. Numbness improved postop in hands, Poor fitting C. Collar.     OVERNIGHT EVENTS: None    Vital Signs Last 24 Hrs  T(C): 36.7 (28 Jul 2021 07:43), Max: 36.8 (28 Jul 2021 04:36)  T(F): 98 (28 Jul 2021 07:43), Max: 98.2 (28 Jul 2021 04:36)  HR: 51 (28 Jul 2021 07:43) (51 - 97)  BP: 169/83 (28 Jul 2021 07:43) (102/59 - 169/83)  BP(mean): 85 (27 Jul 2021 12:00) (76 - 85)  RR: 18 (28 Jul 2021 07:43) (16 - 18)  SpO2: 99% (28 Jul 2021 07:43) (97% - 100%)  IVF: [ ] IVL [ X] NS@50  DIET: [ ] Regular [ X] CCD [ ] Renal [ ] Puree [ ] Dysphagia [ ] Tube Feeds:   PCA: [ ] YES [ X] NO   REYNOLDS: [ ] YES X[ ] NO [ X] VOID   BM: [ X] YES-on 7/28    DRAINS: [ ] SORAIDA (cc/24h) [X ]  (cc/24h)    PHYSICAL EXAM:    Constitutional: No Acute Distress     Neurological: AOx3, Following Commands, Moving all Extremities-UE 4/5, LE 4+/5                                                 Sensation: [X] intact to light touch  [] decreased:     Pulmonary: Clear to Auscultation, No rales, No rhonchi, No wheezes     Cardiovascular: S1, S2, Regular rate and rhythm     Gastrointestinal: Soft, Non-tender, Non-distended     Extremities: No calf tenderness     Incision: [place]-J on, but not fitting well. HMV active. Aquacel dsg-CDI/Flat    LABS:                        10.7   7.55  )-----------( 116      ( 28 Jul 2021 07:19 )             34.0    07-28    136  |  108  |  30<H>  ----------------------------<  117<H>  4.3   |  17<L>  |  0.93    Ca    8.7      28 Jul 2021 05:40  Phos  3.3     07-26  Mg     2.0     07-26    TPro  5.4<L>  /  Alb  2.4<L>  /  TBili  0.3  /  DBili  x   /  AST  20  /  ALT  6<L>  /  AlkPhos  98  07-28  PT/INR - ( 28 Jul 2021 07:19 )   PT: 16.9 sec;   INR: 1.43 ratio    PTT - ( 28 Jul 2021 07:19 )  PTT:26.3 sec    COVID-19 PCR: NotDetec (21 Jul 2021 18:16)  COVID-19 PCR: NotDetec (31 Mar 2021 16:59)  COVID-19 PCR: NotDetec (30 Mar 2021 19:50)  COVID-19 PCR: NotDetec (26 Mar 2021 18:56)  COVID-19 PCR: NotDetec (16 Feb 2021 12:16)  COVID-19 PCR: NotDetec (06 Feb 2021 17:50)    IMAGING:  < from: [facility] Duplex Lower Ext Vein Scan, Bilat (07.27.21 @ 12:13) >  No evidence of deep venous thrombosis in either lower extremity.    MEDICATIONS  (STANDING):  cyanocobalamin 1000 MICROGram(s) Oral daily  dexAMETHasone     Tablet 3 milliGRAM(s) Oral every 6 hours  dextrose 50% Injectable 25 Gram(s) IV Push once  dextrose 50% Injectable 12.5 Gram(s) IV Push once  dextrose 50% Injectable 25 Gram(s) IV Push once  furosemide    Tablet 20 milliGRAM(s) Oral daily  glucagon  Injectable 1 milliGRAM(s) IntraMuscular once  insulin lispro (ADMELOG) corrective regimen sliding scale   SubCutaneous Before meals and at bedtime  lactulose Syrup 20 Gram(s) Oral every 8 hours  pantoprazole    Tablet 40 milliGRAM(s) Oral before breakfast  polyethylene glycol 3350 17 Gram(s) Oral daily  propranolol 10 milliGRAM(s) Oral daily  senna 2 Tablet(s) Oral at bedtime  thiamine 100 milliGRAM(s) Oral daily    MEDICATIONS  (PRN):  oxyCODONE    IR 5 milliGRAM(s) Oral every 4 hours PRN Moderate Pain (4 - 6)  oxyCODONE    IR 10 milliGRAM(s) Oral every 4 hours PRN Severe Pain (7 - 10)

## 2021-07-28 NOTE — PROGRESS NOTE ADULT - ASSESSMENT
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

## 2021-07-28 NOTE — OCCUPATIONAL THERAPY INITIAL EVALUATION ADULT - ADDITIONAL COMMENTS
In october pt started experiencing tingling in the toes which eventually caused him to fall leading to Right sided hip fracture which was surgically repaired. Pt continued rehab after surgery and was able to ambulate using walker. In february 2021 pt received the 2nd dose of Pfizer vaccine and the tingling sensation in his toes started to get worse.  For the last 6 weeks pt has been bed bound as he has little to no strength in his lower extremities. MRI C Spine: Spinal stenosis and cord compression related to a broad-based disc protrusion of uncertain acuity at C2-3 as detailed above. MRI H: (-) s/p C2-4 posterior decompression and fusion

## 2021-07-28 NOTE — PROGRESS NOTE ADULT - ASSESSMENT
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 After the episode pt was able to ambulate using a cane and eventually regained normal strength. In october pt started experiencing tingling in the toes which eventually caused him to fall leading to Right sided hip fracture which was surgically repaired. Pt continued rehab after surgery and was able to ambulate using walker. In february 2021 pt received the 2nd dose of Pfizer vaccine and the tingling sensation in his toes started to get worse.  For the last 6 weeks pt has been bed bound as he has little to no strength in his lower extremities. Pt went to the clinic of Dr. Clarke where he was examined and told to come to the ED. He was brought in by his daughter and wife who were contacted to get most of the history as pt wasn't too clear on the timeline.        (25 Jul 2021 21:03)    PROCEDURE: Adm 7/26 C2-4 Post Cerv Discectomy and fusion      POD#2    PLAN:  Neuro: Cont HMV drain.  IVL now due to elevated BP. On Lasix. C-spine Xray-P FU. Elevated Coags, but trending downward-will d/w starting SQL. Acute anemia from surgical blood loss. Thrombocytopenia improving-FU CBC AM and coags.  Inc activity/JUAN F Mensah called for better fitting [place]-J Collar FU.     Medicine note of 7/27-pharmacologic DVT prophylaxis when ok from neurosurgical standpoint. Problem: Liver cirrhosis.  Recommendation: lactulose ATC and titrate to 2-3 BMs/day to prevent hepatic encephalopathy. d/c IVF and restart Lasix when ok from neurosurg standpoint.   Problem: Duodenal ulcer.  Recommendation: cont PPI. Electronic Signatures: Stanislav Murdock)    Respiratory: Patient instructed to use incentive spirometer [ X] YES [ ] NO              DVT ppx: [X ] SQL [ ] SQH and Venodynes [ ] Left [ ] Right [ X] Bilateral    Discharge Planning:  The patient was evaluated by PT and recommended S. Acute Rehab.   OT eval-P FU.    More than 30 minutes spent on total encounter: more than 50% of the visit was spent on educating the patient and family regarding condition, medications, follow up plans, signs and symptoms to be concerned with, preparing paperwork, and questions answered regarding discharge.       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 After the episode pt was able to ambulate using a cane and eventually regained normal strength. In october pt started experiencing tingling in the toes which eventually caused him to fall leading to Right sided hip fracture which was surgically repaired. Pt continued rehab after surgery and was able to ambulate using walker. In february 2021 pt received the 2nd dose of Pfizer vaccine and the tingling sensation in his toes started to get worse.  For the last 6 weeks pt has been bed bound as he has little to no strength in his lower extremities. Pt went to the clinic of Dr. Clarke where he was examined and told to come to the ED. He was brought in by his daughter and wife who were contacted to get most of the history as pt wasn't too clear on the timeline.        (25 Jul 2021 21:03)    PROCEDURE: Adm 7/26 C2-4 Post Cerv Discectomy and fusion      POD#2    PLAN:  Neuro: Cont HMV drain, at 0927AM drain output=10cc.  Will Ck output in 6hrs and if<50cc will DC HMV and start SQL as D/W SDr Little this AM.  IVL now due to elevated BP. On Lasix. C-spine Xray-P FU. Elevated Coags, but trending downward-will d/w starting SQL. Acute anemia from surgical blood loss. Thrombocytopenia improving-FU CBC AM and coags.  Inc activity/JUAN F Mensah called for better fitting [place]-J Collar FU.     Medicine note of 7/27-pharmacologic DVT prophylaxis when ok from neurosurgical standpoint. Problem: Liver cirrhosis.  Recommendation: lactulose ATC and titrate to 2-3 BMs/day to prevent hepatic encephalopathy. d/c IVF and restart Lasix when ok from neurosurg standpoint.   Problem: Duodenal ulcer.  Recommendation: cont PPI. Electronic Signatures: Stanislav Murdock)    Respiratory: Patient instructed to use incentive spirometer [ X] YES [ ] NO              DVT ppx: [X ] SQL [ ] SQH and Venodynes [ ] Left [ ] Right [ X] Bilateral    Discharge Planning:  The patient was evaluated by PT and recommended S. Acute Rehab.   OT eval-P FU.    More than 30 minutes spent on total encounter: more than 50% of the visit was spent on educating the patient and family regarding condition, medications, follow up plans, signs and symptoms to be concerned with, preparing paperwork, and questions answered regarding discharge.

## 2021-07-28 NOTE — OCCUPATIONAL THERAPY INITIAL EVALUATION ADULT - PERTINENT HX OF CURRENT PROBLEM, REHAB EVAL
80 yo male with PMHx of liver cirrhosis, CIDP who presents to the ED with generalized lower extremity weakness. 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 After the episode pt was able to ambulate using a cane and eventually regained normal strength.

## 2021-07-28 NOTE — PROGRESS NOTE ADULT - SUBJECTIVE AND OBJECTIVE BOX
Stanislav Murdock   Pager [phone]  Office [phone]      CC: Patient is a 81y old  Male who presents with a chief complaint of weakness (28 Jul 2021 09:28)      SUBJECTIVE / OVERNIGHT EVENTS:    MEDICATIONS  (STANDING):  cyanocobalamin 1000 MICROGram(s) Oral daily  dexAMETHasone     Tablet 3 milliGRAM(s) Oral every 6 hours  dextrose 50% Injectable 25 Gram(s) IV Push once  dextrose 50% Injectable 12.5 Gram(s) IV Push once  dextrose 50% Injectable 25 Gram(s) IV Push once  enoxaparin Injectable 40 milliGRAM(s) SubCutaneous daily  furosemide    Tablet 20 milliGRAM(s) Oral daily  glucagon  Injectable 1 milliGRAM(s) IntraMuscular once  insulin lispro (ADMELOG) corrective regimen sliding scale   SubCutaneous Before meals and at bedtime  lactulose Syrup 20 Gram(s) Oral every 8 hours  pantoprazole    Tablet 40 milliGRAM(s) Oral before breakfast  polyethylene glycol 3350 17 Gram(s) Oral daily  propranolol 10 milliGRAM(s) Oral daily  senna 2 Tablet(s) Oral at bedtime  thiamine 100 milliGRAM(s) Oral daily    MEDICATIONS  (PRN):  oxyCODONE    IR 5 milliGRAM(s) Oral every 4 hours PRN Moderate Pain (4 - 6)  oxyCODONE    IR 10 milliGRAM(s) Oral every 4 hours PRN Severe Pain (7 - 10)      Vital Signs Last 24 Hrs  T(C): 36.6 (28 Jul 2021 11:51), Max: 36.8 (28 Jul 2021 04:36)  T(F): 97.9 (28 Jul 2021 11:51), Max: 98.2 (28 Jul 2021 04:36)  HR: 59 (28 Jul 2021 11:51) (51 - 78)  BP: 154/76 (28 Jul 2021 11:51) (122/65 - 169/83)  BP(mean): --  RR: 17 (28 Jul 2021 11:51) (17 - 18)  SpO2: 99% (28 Jul 2021 11:51) (97% - 100%)  CAPILLARY BLOOD GLUCOSE      POCT Blood Glucose.: 135 mg/dL (28 Jul 2021 11:35)  POCT Blood Glucose.: 111 mg/dL (28 Jul 2021 07:36)  POCT Blood Glucose.: 153 mg/dL (27 Jul 2021 21:52)  POCT Blood Glucose.: 135 mg/dL (27 Jul 2021 16:46)    I&O's Summary    27 Jul 2021 07:01  -  28 Jul 2021 07:00  --------------------------------------------------------  IN: 2550 mL / OUT: 610 mL / NET: 1940 mL    28 Jul 2021 07:01  -  28 Jul 2021 12:19  --------------------------------------------------------  IN: 380 mL / OUT: 10 mL / NET: 370 mL      tele:    PHYSICAL EXAM:    GENERAL: NAD   HEENT: EOMI, PERRL  PULM: Clear to auscultation bilaterally  CV: Regular rate and rhythm; nl S1, S2; No murmurs, rubs, or gallops  ABDOMEN: Soft, Nontender, Nondistended; Bowel sounds present  EXTREMITIES/MSK:  No edema, calf tenderness   PSYCH: AAOx3  NEUROLOGY: non-focal          LABS:                        10.7   7.55  )-----------( 116      ( 28 Jul 2021 07:19 )             34.0     07-28    136  |  108  |  30<H>  ----------------------------<  117<H>  4.3   |  17<L>  |  0.93    Ca    8.7      28 Jul 2021 05:40  Phos  3.3     07-26  Mg     2.0     07-26    TPro  5.4<L>  /  Alb  2.4<L>  /  TBili  0.3  /  DBili  x   /  AST  20  /  ALT  6<L>  /  AlkPhos  98  07-28    PT/INR - ( 28 Jul 2021 07:19 )   PT: 16.9 sec;   INR: 1.43 ratio         PTT - ( 28 Jul 2021 07:19 )  PTT:26.3 sec        ABG - ( 26 Jul 2021 18:25 )  pH, Arterial: 7.38  pH, Blood: x     /  pCO2: 42    /  pO2: 314   / HCO3: 24    / Base Excess: -.4   /  SaO2: 100                 RADIOLOGY & ADDITIONAL TESTS:    Imaging Personally Reviewed:    Consultant(s) Notes Reviewed:      Care Discussed with Consultants/Other Providers:   Stanislav Murdock   Pager [phone]  Office [phone]      CC: Patient is a 81y old  Male who presents with a chief complaint of weakness (28 Jul 2021 09:28)      SUBJECTIVE / OVERNIGHT EVENTS: pain controlled. reports improved strength b/l hands and legs. no cp/sob. no n/v/abd pain. +BM. no f/c/r.     MEDICATIONS  (STANDING):  cyanocobalamin 1000 MICROGram(s) Oral daily  dexAMETHasone     Tablet 3 milliGRAM(s) Oral every 6 hours  dextrose 50% Injectable 25 Gram(s) IV Push once  dextrose 50% Injectable 12.5 Gram(s) IV Push once  dextrose 50% Injectable 25 Gram(s) IV Push once  enoxaparin Injectable 40 milliGRAM(s) SubCutaneous daily  furosemide    Tablet 20 milliGRAM(s) Oral daily  glucagon  Injectable 1 milliGRAM(s) IntraMuscular once  insulin lispro (ADMELOG) corrective regimen sliding scale   SubCutaneous Before meals and at bedtime  lactulose Syrup 20 Gram(s) Oral every 8 hours  pantoprazole    Tablet 40 milliGRAM(s) Oral before breakfast  polyethylene glycol 3350 17 Gram(s) Oral daily  propranolol 10 milliGRAM(s) Oral daily  senna 2 Tablet(s) Oral at bedtime  thiamine 100 milliGRAM(s) Oral daily    MEDICATIONS  (PRN):  oxyCODONE    IR 5 milliGRAM(s) Oral every 4 hours PRN Moderate Pain (4 - 6)  oxyCODONE    IR 10 milliGRAM(s) Oral every 4 hours PRN Severe Pain (7 - 10)      Vital Signs Last 24 Hrs  T(C): 36.6 (28 Jul 2021 11:51), Max: 36.8 (28 Jul 2021 04:36)  T(F): 97.9 (28 Jul 2021 11:51), Max: 98.2 (28 Jul 2021 04:36)  HR: 59 (28 Jul 2021 11:51) (51 - 78)  BP: 154/76 (28 Jul 2021 11:51) (122/65 - 169/83)  BP(mean): --  RR: 17 (28 Jul 2021 11:51) (17 - 18)  SpO2: 99% (28 Jul 2021 11:51) (97% - 100%)  CAPILLARY BLOOD GLUCOSE      POCT Blood Glucose.: 135 mg/dL (28 Jul 2021 11:35)  POCT Blood Glucose.: 111 mg/dL (28 Jul 2021 07:36)  POCT Blood Glucose.: 153 mg/dL (27 Jul 2021 21:52)  POCT Blood Glucose.: 135 mg/dL (27 Jul 2021 16:46)    I&O's Summary    27 Jul 2021 07:01  -  28 Jul 2021 07:00  --------------------------------------------------------  IN: 2550 mL / OUT: 610 mL / NET: 1940 mL    28 Jul 2021 07:01  -  28 Jul 2021 12:19  --------------------------------------------------------  IN: 380 mL / OUT: 10 mL / NET: 370 mL          PHYSICAL EXAM:    GENERAL: NAD   HEENT: EOMI, PERRL  PULM: Clear to auscultation bilaterally  CV: Regular rate and rhythm; nl S1, S2; No murmurs, rubs, or gallops  ABDOMEN: Soft, Nontender, Nondistended; Bowel sounds present  EXTREMITIES/MSK:  No edema, calf tenderness   PSYCH: AAOx3  NEUROLOGY: improved HG b/l; 4+/5 RUE; 4-4+/5 b/l LE          LABS:                        10.7   7.55  )-----------( 116      ( 28 Jul 2021 07:19 )             34.0     07-28    136  |  108  |  30<H>  ----------------------------<  117<H>  4.3   |  17<L>  |  0.93    Ca    8.7      28 Jul 2021 05:40  Phos  3.3     07-26  Mg     2.0     07-26    TPro  5.4<L>  /  Alb  2.4<L>  /  TBili  0.3  /  DBili  x   /  AST  20  /  ALT  6<L>  /  AlkPhos  98  07-28    PT/INR - ( 28 Jul 2021 07:19 )   PT: 16.9 sec;   INR: 1.43 ratio         PTT - ( 28 Jul 2021 07:19 )  PTT:26.3 sec        ABG - ( 26 Jul 2021 18:25 )  pH, Arterial: 7.38  pH, Blood: x     /  pCO2: 42    /  pO2: 314   / HCO3: 24    / Base Excess: -.4   /  SaO2: 100                 RADIOLOGY & ADDITIONAL TESTS:    Imaging Personally Reviewed:    Consultant(s) Notes Reviewed:      Care Discussed with Consultants/Other Providers: neurosurg

## 2021-07-28 NOTE — OCCUPATIONAL THERAPY INITIAL EVALUATION ADULT - LIVES WITH, PROFILE
lives with family in a private house, pt is a poor historian however reports requiring assistance for all ADLs and assistance to transfer bed to chair

## 2021-07-28 NOTE — CHART NOTE - NSCHARTNOTEFT_GEN_A_CORE
Measure fit and apply Aspen Multi post cervical orthosis. Replacement interface dispensed to maintain skin integrity and hygiene. Reviewed application skin precautions and care. Written instructions and contact information given. To notify office with any issues questions or concerns.    Orthosis to be worn to protect and stabilize spine reduce pain and facilitate healing following his surgical procedure.    Tenzin PRYOR  [facility]  [phone]

## 2021-07-28 NOTE — OCCUPATIONAL THERAPY INITIAL EVALUATION ADULT - VISUAL ASSESSMENT: SCANNING
INR in range today with this new dose. Pt requests not to be called unless is out of range.   Will Maintain this new dose & recheck in 2 weeks.      WFL

## 2021-07-29 LAB
ALBUMIN SERPL ELPH-MCNC: 3 G/DL — LOW (ref 3.3–5)
ALP SERPL-CCNC: 109 U/L — SIGNIFICANT CHANGE UP (ref 40–120)
ALT FLD-CCNC: 8 U/L — LOW (ref 10–45)
ANION GAP SERPL CALC-SCNC: 11 MMOL/L — SIGNIFICANT CHANGE UP (ref 5–17)
APTT BLD: 24.8 SEC — LOW (ref 27.5–35.5)
AST SERPL-CCNC: 20 U/L — SIGNIFICANT CHANGE UP (ref 10–40)
BILIRUB SERPL-MCNC: 0.4 MG/DL — SIGNIFICANT CHANGE UP (ref 0.2–1.2)
BUN SERPL-MCNC: 30 MG/DL — HIGH (ref 7–23)
CALCIUM SERPL-MCNC: 9 MG/DL — SIGNIFICANT CHANGE UP (ref 8.4–10.5)
CHLORIDE SERPL-SCNC: 103 MMOL/L — SIGNIFICANT CHANGE UP (ref 96–108)
CO2 SERPL-SCNC: 25 MMOL/L — SIGNIFICANT CHANGE UP (ref 22–31)
CREAT SERPL-MCNC: 0.99 MG/DL — SIGNIFICANT CHANGE UP (ref 0.5–1.3)
GLUCOSE BLDC GLUCOMTR-MCNC: 101 MG/DL — HIGH (ref 70–99)
GLUCOSE BLDC GLUCOMTR-MCNC: 107 MG/DL — HIGH (ref 70–99)
GLUCOSE BLDC GLUCOMTR-MCNC: 140 MG/DL — HIGH (ref 70–99)
GLUCOSE BLDC GLUCOMTR-MCNC: 90 MG/DL — SIGNIFICANT CHANGE UP (ref 70–99)
GLUCOSE SERPL-MCNC: 116 MG/DL — HIGH (ref 70–99)
HCT VFR BLD CALC: 34.2 % — LOW (ref 39–50)
HGB BLD-MCNC: 11.1 G/DL — LOW (ref 13–17)
INR BLD: 1.49 RATIO — HIGH (ref 0.88–1.16)
MCHC RBC-ENTMCNC: 26.2 PG — LOW (ref 27–34)
MCHC RBC-ENTMCNC: 32.5 GM/DL — SIGNIFICANT CHANGE UP (ref 32–36)
MCV RBC AUTO: 80.7 FL — SIGNIFICANT CHANGE UP (ref 80–100)
NRBC # BLD: 0 /100 WBCS — SIGNIFICANT CHANGE UP (ref 0–0)
PLATELET # BLD AUTO: 120 K/UL — LOW (ref 150–400)
POTASSIUM SERPL-MCNC: 3.9 MMOL/L — SIGNIFICANT CHANGE UP (ref 3.5–5.3)
POTASSIUM SERPL-SCNC: 3.9 MMOL/L — SIGNIFICANT CHANGE UP (ref 3.5–5.3)
PROT SERPL-MCNC: 6 G/DL — SIGNIFICANT CHANGE UP (ref 6–8.3)
PROTHROM AB SERPL-ACNC: 17.5 SEC — HIGH (ref 10.6–13.6)
RBC # BLD: 4.24 M/UL — SIGNIFICANT CHANGE UP (ref 4.2–5.8)
RBC # FLD: 16.2 % — HIGH (ref 10.3–14.5)
SODIUM SERPL-SCNC: 139 MMOL/L — SIGNIFICANT CHANGE UP (ref 135–145)
WBC # BLD: 7.66 K/UL — SIGNIFICANT CHANGE UP (ref 3.8–10.5)
WBC # FLD AUTO: 7.66 K/UL — SIGNIFICANT CHANGE UP (ref 3.8–10.5)

## 2021-07-29 PROCEDURE — 73030 X-RAY EXAM OF SHOULDER: CPT | Mod: 26,RT

## 2021-07-29 PROCEDURE — 99232 SBSQ HOSP IP/OBS MODERATE 35: CPT

## 2021-07-29 RX ADMIN — SENNA PLUS 2 TABLET(S): 8.6 TABLET ORAL at 21:25

## 2021-07-29 RX ADMIN — Medication 100 MILLIGRAM(S): at 12:12

## 2021-07-29 RX ADMIN — LACTULOSE 20 GRAM(S): 10 SOLUTION ORAL at 12:12

## 2021-07-29 RX ADMIN — PANTOPRAZOLE SODIUM 40 MILLIGRAM(S): 20 TABLET, DELAYED RELEASE ORAL at 05:01

## 2021-07-29 RX ADMIN — LACTULOSE 20 GRAM(S): 10 SOLUTION ORAL at 23:01

## 2021-07-29 RX ADMIN — Medication 3 MILLIGRAM(S): at 23:01

## 2021-07-29 RX ADMIN — Medication 3 MILLIGRAM(S): at 05:00

## 2021-07-29 RX ADMIN — LACTULOSE 20 GRAM(S): 10 SOLUTION ORAL at 05:01

## 2021-07-29 RX ADMIN — Medication 3 MILLIGRAM(S): at 17:52

## 2021-07-29 RX ADMIN — PREGABALIN 1000 MICROGRAM(S): 225 CAPSULE ORAL at 12:12

## 2021-07-29 RX ADMIN — ENOXAPARIN SODIUM 40 MILLIGRAM(S): 100 INJECTION SUBCUTANEOUS at 12:12

## 2021-07-29 RX ADMIN — LACTULOSE 20 GRAM(S): 10 SOLUTION ORAL at 17:52

## 2021-07-29 RX ADMIN — Medication 20 MILLIGRAM(S): at 05:01

## 2021-07-29 RX ADMIN — Medication 3 MILLIGRAM(S): at 12:12

## 2021-07-29 NOTE — PROGRESS NOTE ADULT - SUBJECTIVE AND OBJECTIVE BOX
Stanislav Murdock   Pager [phone]  Office [phone]      CC: Patient is a 81y old  Male who presents with a chief complaint of weakness (28 Jul 2021 12:19)      SUBJECTIVE / OVERNIGHT EVENTS:    MEDICATIONS  (STANDING):  cyanocobalamin 1000 MICROGram(s) Oral daily  dexAMETHasone     Tablet 3 milliGRAM(s) Oral every 6 hours  dextrose 50% Injectable 25 Gram(s) IV Push once  dextrose 50% Injectable 12.5 Gram(s) IV Push once  dextrose 50% Injectable 25 Gram(s) IV Push once  enoxaparin Injectable 40 milliGRAM(s) SubCutaneous daily  furosemide    Tablet 20 milliGRAM(s) Oral daily  glucagon  Injectable 1 milliGRAM(s) IntraMuscular once  insulin lispro (ADMELOG) corrective regimen sliding scale   SubCutaneous Before meals and at bedtime  lactulose Syrup 20 Gram(s) Oral every 6 hours  pantoprazole    Tablet 40 milliGRAM(s) Oral before breakfast  polyethylene glycol 3350 17 Gram(s) Oral two times a day  propranolol 10 milliGRAM(s) Oral daily  senna 2 Tablet(s) Oral at bedtime  thiamine 100 milliGRAM(s) Oral daily    MEDICATIONS  (PRN):  oxyCODONE    IR 5 milliGRAM(s) Oral every 4 hours PRN Moderate Pain (4 - 6)  oxyCODONE    IR 10 milliGRAM(s) Oral every 4 hours PRN Severe Pain (7 - 10)      Vital Signs Last 24 Hrs  T(C): 36.5 (29 Jul 2021 08:03), Max: 36.8 (28 Jul 2021 16:29)  T(F): 97.7 (29 Jul 2021 08:03), Max: 98.2 (28 Jul 2021 16:29)  HR: 55 (29 Jul 2021 08:03) (55 - 75)  BP: 168/82 (29 Jul 2021 08:03) (131/72 - 168/82)  BP(mean): --  RR: 18 (29 Jul 2021 08:03) (17 - 18)  SpO2: 97% (29 Jul 2021 08:03) (97% - 100%)  CAPILLARY BLOOD GLUCOSE      POCT Blood Glucose.: 101 mg/dL (29 Jul 2021 07:46)  POCT Blood Glucose.: 110 mg/dL (28 Jul 2021 21:05)  POCT Blood Glucose.: 92 mg/dL (28 Jul 2021 16:54)  POCT Blood Glucose.: 135 mg/dL (28 Jul 2021 11:35)    I&O's Summary    28 Jul 2021 07:01  -  29 Jul 2021 07:00  --------------------------------------------------------  IN: 1370 mL / OUT: 1010 mL / NET: 360 mL      tele:    PHYSICAL EXAM:    GENERAL: NAD   HEENT: EOMI, PERRL  PULM: Clear to auscultation bilaterally  CV: Regular rate and rhythm; nl S1, S2; No murmurs, rubs, or gallops  ABDOMEN: Soft, Nontender, Nondistended; Bowel sounds present  EXTREMITIES/MSK:  No edema, calf tenderness   PSYCH: AAOx3  NEUROLOGY: non-focal          LABS:                        11.1   7.66  )-----------( 120      ( 29 Jul 2021 06:35 )             34.2     07-29    139  |  103  |  30<H>  ----------------------------<  116<H>  3.9   |  25  |  0.99    Ca    9.0      29 Jul 2021 06:33    TPro  6.0  /  Alb  3.0<L>  /  TBili  0.4  /  DBili  x   /  AST  20  /  ALT  8<L>  /  AlkPhos  109  07-29    PT/INR - ( 29 Jul 2021 06:37 )   PT: 17.5 sec;   INR: 1.49 ratio         PTT - ( 29 Jul 2021 06:37 )  PTT:24.8 sec            RADIOLOGY & ADDITIONAL TESTS:    Imaging Personally Reviewed:    Consultant(s) Notes Reviewed:      Care Discussed with Consultants/Other Providers:   Stanislav Murdock   Pager [phone]  Office [phone]      CC: Patient is a 81y old  Male who presents with a chief complaint of weakness (28 Jul 2021 12:19)    [place] [id number]   SUBJECTIVE / OVERNIGHT EVENTS: pt reports improved b/l UE strength. no cp/sob. no n/v/abd pain. 2 BMs overnight. no f/c/r    MEDICATIONS  (STANDING):  cyanocobalamin 1000 MICROGram(s) Oral daily  dexAMETHasone     Tablet 3 milliGRAM(s) Oral every 6 hours  dextrose 50% Injectable 25 Gram(s) IV Push once  dextrose 50% Injectable 12.5 Gram(s) IV Push once  dextrose 50% Injectable 25 Gram(s) IV Push once  enoxaparin Injectable 40 milliGRAM(s) SubCutaneous daily  furosemide    Tablet 20 milliGRAM(s) Oral daily  glucagon  Injectable 1 milliGRAM(s) IntraMuscular once  insulin lispro (ADMELOG) corrective regimen sliding scale   SubCutaneous Before meals and at bedtime  lactulose Syrup 20 Gram(s) Oral every 6 hours  pantoprazole    Tablet 40 milliGRAM(s) Oral before breakfast  polyethylene glycol 3350 17 Gram(s) Oral two times a day  propranolol 10 milliGRAM(s) Oral daily  senna 2 Tablet(s) Oral at bedtime  thiamine 100 milliGRAM(s) Oral daily    MEDICATIONS  (PRN):  oxyCODONE    IR 5 milliGRAM(s) Oral every 4 hours PRN Moderate Pain (4 - 6)  oxyCODONE    IR 10 milliGRAM(s) Oral every 4 hours PRN Severe Pain (7 - 10)      Vital Signs Last 24 Hrs repeat bp 120s/70s  T(C): 36.5 (29 Jul 2021 08:03), Max: 36.8 (28 Jul 2021 16:29)  T(F): 97.7 (29 Jul 2021 08:03), Max: 98.2 (28 Jul 2021 16:29)  HR: 55 (29 Jul 2021 08:03) (55 - 75)  BP: 168/82 (29 Jul 2021 08:03) (131/72 - 168/82)  BP(mean): --  RR: 18 (29 Jul 2021 08:03) (17 - 18)  SpO2: 97% (29 Jul 2021 08:03) (97% - 100%)  CAPILLARY BLOOD GLUCOSE      POCT Blood Glucose.: 101 mg/dL (29 Jul 2021 07:46)  POCT Blood Glucose.: 110 mg/dL (28 Jul 2021 21:05)  POCT Blood Glucose.: 92 mg/dL (28 Jul 2021 16:54)  POCT Blood Glucose.: 135 mg/dL (28 Jul 2021 11:35)    I&O's Summary    28 Jul 2021 07:01  -  29 Jul 2021 07:00  --------------------------------------------------------  IN: 1370 mL / OUT: 1010 mL / NET: 360 mL        PHYSICAL EXAM:    GENERAL: NAD   HEENT: EOMI, PERRL  PULM: Clear to auscultation bilaterally  CV: Regular rate and rhythm; nl S1, S2; No murmurs, rubs, or gallops  ABDOMEN: Soft, Nontender, Nondistended; Bowel sounds present  EXTREMITIES/MSK:  No edema, calf tenderness   PSYCH: AAOx3  NEUROLOGY: non-focal          LABS:                        11.1   7.66  )-----------( 120      ( 29 Jul 2021 06:35 )             34.2     07-29    139  |  103  |  30<H>  ----------------------------<  116<H>  3.9   |  25  |  0.99    Ca    9.0      29 Jul 2021 06:33    TPro  6.0  /  Alb  3.0<L>  /  TBili  0.4  /  DBili  x   /  AST  20  /  ALT  8<L>  /  AlkPhos  109  07-29    PT/INR - ( 29 Jul 2021 06:37 )   PT: 17.5 sec;   INR: 1.49 ratio         PTT - ( 29 Jul 2021 06:37 )  PTT:24.8 sec            RADIOLOGY & ADDITIONAL TESTS:    Imaging Personally Reviewed:    Consultant(s) Notes Reviewed:      Care Discussed with Consultants/Other Providers: neurosurg   Stanislav Murdock   Pager [phone]  Office [phone]      CC: Patient is a 81y old  Male who presents with a chief complaint of weakness (28 Jul 2021 12:19)    [place] [id number]   SUBJECTIVE / OVERNIGHT EVENTS: pt reports improved b/l UE strength. no cp/sob. no n/v/abd pain. 2 BMs overnight. no f/c/r    MEDICATIONS  (STANDING):  cyanocobalamin 1000 MICROGram(s) Oral daily  dexAMETHasone     Tablet 3 milliGRAM(s) Oral every 6 hours  dextrose 50% Injectable 25 Gram(s) IV Push once  dextrose 50% Injectable 12.5 Gram(s) IV Push once  dextrose 50% Injectable 25 Gram(s) IV Push once  enoxaparin Injectable 40 milliGRAM(s) SubCutaneous daily  furosemide    Tablet 20 milliGRAM(s) Oral daily  glucagon  Injectable 1 milliGRAM(s) IntraMuscular once  insulin lispro (ADMELOG) corrective regimen sliding scale   SubCutaneous Before meals and at bedtime  lactulose Syrup 20 Gram(s) Oral every 6 hours  pantoprazole    Tablet 40 milliGRAM(s) Oral before breakfast  polyethylene glycol 3350 17 Gram(s) Oral two times a day  propranolol 10 milliGRAM(s) Oral daily  senna 2 Tablet(s) Oral at bedtime  thiamine 100 milliGRAM(s) Oral daily    MEDICATIONS  (PRN):  oxyCODONE    IR 5 milliGRAM(s) Oral every 4 hours PRN Moderate Pain (4 - 6)  oxyCODONE    IR 10 milliGRAM(s) Oral every 4 hours PRN Severe Pain (7 - 10)      Vital Signs Last 24 Hrs repeat bp 120s/70s  T(C): 36.5 (29 Jul 2021 08:03), Max: 36.8 (28 Jul 2021 16:29)  T(F): 97.7 (29 Jul 2021 08:03), Max: 98.2 (28 Jul 2021 16:29)  HR: 55 (29 Jul 2021 08:03) (55 - 75)  BP: 168/82 (29 Jul 2021 08:03) (131/72 - 168/82)  BP(mean): --  RR: 18 (29 Jul 2021 08:03) (17 - 18)  SpO2: 97% (29 Jul 2021 08:03) (97% - 100%)  CAPILLARY BLOOD GLUCOSE      POCT Blood Glucose.: 101 mg/dL (29 Jul 2021 07:46)  POCT Blood Glucose.: 110 mg/dL (28 Jul 2021 21:05)  POCT Blood Glucose.: 92 mg/dL (28 Jul 2021 16:54)  POCT Blood Glucose.: 135 mg/dL (28 Jul 2021 11:35)    I&O's Summary    28 Jul 2021 07:01  -  29 Jul 2021 07:00  --------------------------------------------------------  IN: 1370 mL / OUT: 1010 mL / NET: 360 mL        PHYSICAL EXAM:    GENERAL: NAD   HEENT: EOMI, PERRL  PULM: Clear to auscultation bilaterally  CV: Regular rate and rhythm; nl S1, S2; No murmurs, rubs, or gallops  ABDOMEN: Soft, Nontender, Nondistended; Bowel sounds present  EXTREMITIES/MSK:  No edema, calf tenderness   PSYCH: AAOx2 (didnt know [facility]ake of hospital but knew what hospital he was transferred from); slow to answer and tangential; couldnt state why he was in hospital (baseline per wife)  NEUROLOGY: improved HG b/l; 4+/5 RUE; 4-4+/5 b/l LE          LABS:                        11.1   7.66  )-----------( 120      ( 29 Jul 2021 06:35 )             34.2     07-29    139  |  103  |  30<H>  ----------------------------<  116<H>  3.9   |  25  |  0.99    Ca    9.0      29 Jul 2021 06:33    TPro  6.0  /  Alb  3.0<L>  /  TBili  0.4  /  DBili  x   /  AST  20  /  ALT  8<L>  /  AlkPhos  109  07-29    PT/INR - ( 29 Jul 2021 06:37 )   PT: 17.5 sec;   INR: 1.49 ratio         PTT - ( 29 Jul 2021 06:37 )  PTT:24.8 sec            RADIOLOGY & ADDITIONAL TESTS:    Imaging Personally Reviewed:    Consultant(s) Notes Reviewed:      Care Discussed with Consultants/Other Providers: neurosurg

## 2021-07-29 NOTE — PROGRESS NOTE ADULT - SUBJECTIVE AND OBJECTIVE BOX
SUBJECTIVE: Patient seen and examined at bedside. No acute overnight events noted. Patient denies chest pain, shortness of breath, nausea/vomiting. 2 bowel movements over last 24 hrs. Worked with PT this morning, but only took 2 steps. Nursing staff to get patient OOB to chair. Patient c/o RT shoulder pain today.    Vital Signs Last 24 Hrs  T(C): 36.5 (07-29-21 @ 08:03), Max: 36.8 (07-28-21 @ 16:29)  T(F): 97.7 (07-29-21 @ 08:03), Max: 98.2 (07-28-21 @ 16:29)  HR: 55 (07-29-21 @ 08:03) (55 - 75)  BP: 168/82 (07-29-21 @ 08:03) (131/72 - 168/82)   RR: 18 (07-29-21 @ 08:03) (18 - 18)  SpO2: 97% (07-29-21 @ 08:03) (97% - 100%)    PHYSICAL EXAM:    Constitutional: No Acute Distress, resting comfortably in bed    Neurological: Awake, alert, oriented to person, place and time, occasionally seems confused, speech clear, face equal, tongue midline, briskly following commands, no drift, moves all extremities: b/l UE 4/5. b/l LE 4+/5, sensation intact to light touch throughout, pupils 4mm and reactive bilaterally, extraocular movements intact, no nystagmus    Incision: +posterior neck dressing C/D/I    Pulmonary: Clear to Auscultation, No rales, No rhonchi, No wheezes     Cardiovascular: S1, S2, Regular rate and rhythm     Gastrointestinal: Soft, Non-tender, Non-distended, +bowel sounds x 4    Extremities: No calf tenderness bilaterally, no cyanosis, clubbing or edema      LABS:                          11.1   7.66  )-----------( 120      ( 29 Jul 2021 06:35 )             34.2    07-29    139  |  103  |  30<H>  ----------------------------<  116<H>  3.9   |  25  |  0.99    Ca    9.0      29 Jul 2021 06:33    TPro  6.0  /  Alb  3.0<L>  /  TBili  0.4  /  DBili  x   /  AST  20  /  ALT  8<L>  /  AlkPhos  109  07-29  PT/INR - ( 29 Jul 2021 06:37 )   PT: 17.5 sec;   INR: 1.49 ratio         PTT - ( 29 Jul 2021 06:37 )  PTT:24.8 sec    07-28 @ 07:01  -  07-29 @ 07:00  --------------------------------------------------------  IN: 1370 mL / OUT: 1010 mL / NET: 360 mL      IMAGING:     no post op imaging    MEDICATIONS:  Antibiotics:    Neuro:  oxyCODONE    IR 5 milliGRAM(s) Oral every 4 hours PRN Moderate Pain (4 - 6)  oxyCODONE    IR 10 milliGRAM(s) Oral every 4 hours PRN Severe Pain (7 - 10)    Cardiac:  furosemide    Tablet 20 milliGRAM(s) Oral daily  propranolol 10 milliGRAM(s) Oral daily    Pulm:    GI/:  lactulose Syrup 20 Gram(s) Oral every 6 hours  pantoprazole    Tablet 40 milliGRAM(s) Oral before breakfast  polyethylene glycol 3350 17 Gram(s) Oral two times a day  senna 2 Tablet(s) Oral at bedtime    Other:   cyanocobalamin 1000 MICROGram(s) Oral daily  dexAMETHasone     Tablet 3 milliGRAM(s) Oral every 6 hours  dextrose 50% Injectable 25 Gram(s) IV Push once  dextrose 50% Injectable 12.5 Gram(s) IV Push once  dextrose 50% Injectable 25 Gram(s) IV Push once  enoxaparin Injectable 40 milliGRAM(s) SubCutaneous daily  glucagon  Injectable 1 milliGRAM(s) IntraMuscular once  insulin lispro (ADMELOG) corrective regimen sliding scale   SubCutaneous Before meals and at bedtime  thiamine 100 milliGRAM(s) Oral daily        DIET: ccd

## 2021-07-29 NOTE — PROGRESS NOTE ADULT - ASSESSMENT
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; 7/26 s/p C2-4 posterior cervical instrumentation and fusion      NEURO:  - Neuro checks every 4 hours  - Cervical collar at all times, Aspen collar in placed fitted by orthotist  - Oxy IR PRN for pain control  - HMV removed yesterday  - Decadron taper for spinal cord edema    PULM:  - Encouraged incentive spirometry  - Tolerating room air    CV:  - Continue Lasix 20mg daily and Propranolol 10mg daily for history of HTN  - Goal normotension    GI:  - Tolerating regular consistency diet  - GI prophylaxis [] not indicated [x] PPI - hx of duodenal ulcer [] other:  - Bowel regimen [] colace [x] senna [] other: miralax, lactulose (home med) goal 2-3 BMs/day for history of cirrhosis  - Follows with hepatology as outpatient    RENAL:  - IVL  - replete lytes PRN    ENDO:   - Goal euglycemia (-180)  - ISS TID AC    HEME/ONC:  - VTE prophylaxis: [x] SCDs [x] chemoprophylaxis : on SQL [] hold chemoprophylaxis due to: [] high risk of DVT/PE on admission due to:  - Given history of bleeding and coagulopathy secondary to cirrhosis, hospitalist recommending not to continue chemical DVT ppx while at rehab and encourage mobilization    ID:  - Afebrile  - Received covid vaccine    MISC:   - RT shoulder xray to r/o fracture given c/o RT shoulder pain    DISPO:   - PT/OT: ROBINSON, accepted to [facility] pending insurance auth  - Will discuss with Dr. Hedy Roman #[id number]

## 2021-07-29 NOTE — PROGRESS NOTE ADULT - PROBLEM SELECTOR PLAN 1
s/p C2-4 posterior decompression and fusion 7/27/21. PT. risks/benefits of pharm DVT prophylaxis at rehab d/w wife and she prefers to stop pharm dvt prophylaxis after hospitalization.

## 2021-07-29 NOTE — PROGRESS NOTE ADULT - ASSESSMENT
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

## 2021-07-30 LAB
ANION GAP SERPL CALC-SCNC: 9 MMOL/L — SIGNIFICANT CHANGE UP (ref 5–17)
BUN SERPL-MCNC: 29 MG/DL — HIGH (ref 7–23)
CALCIUM SERPL-MCNC: 8.7 MG/DL — SIGNIFICANT CHANGE UP (ref 8.4–10.5)
CHLORIDE SERPL-SCNC: 103 MMOL/L — SIGNIFICANT CHANGE UP (ref 96–108)
CO2 SERPL-SCNC: 23 MMOL/L — SIGNIFICANT CHANGE UP (ref 22–31)
CREAT SERPL-MCNC: 0.93 MG/DL — SIGNIFICANT CHANGE UP (ref 0.5–1.3)
GLUCOSE BLDC GLUCOMTR-MCNC: 101 MG/DL — HIGH (ref 70–99)
GLUCOSE BLDC GLUCOMTR-MCNC: 119 MG/DL — HIGH (ref 70–99)
GLUCOSE BLDC GLUCOMTR-MCNC: 124 MG/DL — HIGH (ref 70–99)
GLUCOSE BLDC GLUCOMTR-MCNC: 149 MG/DL — HIGH (ref 70–99)
GLUCOSE SERPL-MCNC: 129 MG/DL — HIGH (ref 70–99)
HCT VFR BLD CALC: 34.6 % — LOW (ref 39–50)
HGB BLD-MCNC: 10.7 G/DL — LOW (ref 13–17)
MCHC RBC-ENTMCNC: 26.1 PG — LOW (ref 27–34)
MCHC RBC-ENTMCNC: 30.9 GM/DL — LOW (ref 32–36)
MCV RBC AUTO: 84.4 FL — SIGNIFICANT CHANGE UP (ref 80–100)
NRBC # BLD: 0 /100 WBCS — SIGNIFICANT CHANGE UP (ref 0–0)
PLATELET # BLD AUTO: 112 K/UL — LOW (ref 150–400)
POTASSIUM SERPL-MCNC: 4.5 MMOL/L — SIGNIFICANT CHANGE UP (ref 3.5–5.3)
POTASSIUM SERPL-SCNC: 4.5 MMOL/L — SIGNIFICANT CHANGE UP (ref 3.5–5.3)
RBC # BLD: 4.1 M/UL — LOW (ref 4.2–5.8)
RBC # FLD: 16.5 % — HIGH (ref 10.3–14.5)
SODIUM SERPL-SCNC: 135 MMOL/L — SIGNIFICANT CHANGE UP (ref 135–145)
WBC # BLD: 8.6 K/UL — SIGNIFICANT CHANGE UP (ref 3.8–10.5)
WBC # FLD AUTO: 8.6 K/UL — SIGNIFICANT CHANGE UP (ref 3.8–10.5)

## 2021-07-30 PROCEDURE — 99232 SBSQ HOSP IP/OBS MODERATE 35: CPT

## 2021-07-30 RX ORDER — DEXAMETHASONE 0.5 MG/5ML
2 ELIXIR ORAL EVERY 6 HOURS
Refills: 0 | Status: DISCONTINUED | OUTPATIENT
Start: 2021-07-30 | End: 2021-07-31

## 2021-07-30 RX ADMIN — POLYETHYLENE GLYCOL 3350 17 GRAM(S): 17 POWDER, FOR SOLUTION ORAL at 05:04

## 2021-07-30 RX ADMIN — ENOXAPARIN SODIUM 40 MILLIGRAM(S): 100 INJECTION SUBCUTANEOUS at 12:58

## 2021-07-30 RX ADMIN — LACTULOSE 20 GRAM(S): 10 SOLUTION ORAL at 12:57

## 2021-07-30 RX ADMIN — Medication 100 MILLIGRAM(S): at 12:58

## 2021-07-30 RX ADMIN — Medication 20 MILLIGRAM(S): at 05:03

## 2021-07-30 RX ADMIN — POLYETHYLENE GLYCOL 3350 17 GRAM(S): 17 POWDER, FOR SOLUTION ORAL at 17:18

## 2021-07-30 RX ADMIN — Medication 2 MILLIGRAM(S): at 17:18

## 2021-07-30 RX ADMIN — Medication 2 MILLIGRAM(S): at 12:57

## 2021-07-30 RX ADMIN — LACTULOSE 20 GRAM(S): 10 SOLUTION ORAL at 05:04

## 2021-07-30 RX ADMIN — Medication 2 MILLIGRAM(S): at 23:16

## 2021-07-30 RX ADMIN — PANTOPRAZOLE SODIUM 40 MILLIGRAM(S): 20 TABLET, DELAYED RELEASE ORAL at 05:03

## 2021-07-30 RX ADMIN — SENNA PLUS 2 TABLET(S): 8.6 TABLET ORAL at 21:26

## 2021-07-30 RX ADMIN — LACTULOSE 20 GRAM(S): 10 SOLUTION ORAL at 23:16

## 2021-07-30 RX ADMIN — LACTULOSE 20 GRAM(S): 10 SOLUTION ORAL at 17:17

## 2021-07-30 RX ADMIN — PREGABALIN 1000 MICROGRAM(S): 225 CAPSULE ORAL at 12:58

## 2021-07-30 RX ADMIN — Medication 3 MILLIGRAM(S): at 05:03

## 2021-07-30 NOTE — PROGRESS NOTE ADULT - ASSESSMENT
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) who presents to the ED with generalized lower extremity weakness, found with severe C2-3 stenosis; 7/26 s/p C2-4 posterior cervical instrumentation and fusion    NEURO:  - Neuro checks every 4 hours  - Cervical collar at all times, Aspen collar in placed fitted by orthotist but patient requesting re-fitting  - Oxy IR PRN for pain control  - HMV removed  - Decadron taper for spinal cord edema  - Encouraged mobilization and discussed with patient the importance of mobilization post operatively    PULM:  - Encouraged incentive spirometry  - Tolerating room air    CV:  - Continue Lasix 20mg daily and Propranolol 10mg daily for history of HTN  - Goal normotension    GI:  - Tolerating regular consistency diet  - GI prophylaxis [] not indicated [x] PPI - hx of duodenal ulcer [] other:  - Bowel regimen [] colace [x] senna [] other: miralax, lactulose (home med) goal 2-3 BMs/day for history of cirrhosis  - Follows with hepatology as outpatient    RENAL:  - IVL  - replete lytes PRN    ENDO:   - Goal euglycemia (-180)  - ISS TID AC    HEME/ONC:   - VTE prophylaxis: [x] SCDs [x] chemoprophylaxis : on SQL [] hold chemoprophylaxis due to: [] high risk of DVT/PE on admission due to:  - Given history of bleeding and coagulopathy secondary to cirrhosis, hospitalist recommending not to continue chemical DVT ppx while at rehab and encourage mobilization. However, the risk of VTE also exists and will discuss with patient and wife continuing VTE ppx for a specified amount of time      ID:  - Afebrile  - Received covid vaccine    MISC:   - RT shoulder xray done to r/o fracture given c/o RT shoulder pain, awaiting results    DISPO:   - PT[facility]OT[facility], [facility]  - Will discuss with Dr. Hedy Roman #[id number]

## 2021-07-30 NOTE — PROGRESS NOTE ADULT - SUBJECTIVE AND OBJECTIVE BOX
SUBJECTIVE: Patient seen and examined at bedside. No acute overnight events noted. Patient denies chest pain, shortness of breath, nausea/vomiting. 2 bowel movements over last 24 hrs. Worked with PT this morning, but only took 2 steps. Nursing staff to get patient OOB to chair. Patient c/o RT shoulder pain today.    Vital Signs Last 24 Hrs  T(C): 36.5 (07-29-21 @ 08:03), Max: 36.8 (07-28-21 @ 16:29)  T(F): 97.7 (07-29-21 @ 08:03), Max: 98.2 (07-28-21 @ 16:29)  HR: 55 (07-29-21 @ 08:03) (55 - 75)  BP: 168/82 (07-29-21 @ 08:03) (131/72 - 168/82)   RR: 18 (07-29-21 @ 08:03) (18 - 18)  SpO2: 97% (07-29-21 @ 08:03) (97% - 100%)    PHYSICAL EXAM:    Constitutional: No Acute Distress, resting comfortably in bed    Neurological: Awake, alert, oriented to person, place and time, occasionally seems confused, speech clear, face equal, tongue midline, briskly following commands, no drift, moves all extremities: b/l UE 4/5. b/l LE 4+/5, sensation intact to light touch throughout, pupils 4mm and reactive bilaterally, extraocular movements intact, no nystagmus    Incision: +posterior neck staples C/D/I    Pulmonary: Clear to Auscultation, No rales, No rhonchi, No wheezes     Cardiovascular: S1, S2, Regular rate and rhythm     Gastrointestinal: Soft, Non-tender, Non-distended, +bowel sounds x 4    Extremities: No calf tenderness bilaterally, no cyanosis, clubbing or edema                   LABS:                 10.7   8.60  )-----------( 112      ( 30 Jul 2021 05:00 )             34.6     07-30    135  |  103  |  29<H>  ----------------------------<  129<H>  4.5   |  23  |  0.93    Ca    8.7      30 Jul 2021 05:00      IMAGING:     no post op imaging      MEDICATIONS  (STANDING):  cyanocobalamin 1000 MICROGram(s) Oral daily  dexAMETHasone     Tablet 2 milliGRAM(s) Oral every 6 hours  dextrose 50% Injectable 25 Gram(s) IV Push once  dextrose 50% Injectable 12.5 Gram(s) IV Push once  dextrose 50% Injectable 25 Gram(s) IV Push once  enoxaparin Injectable 40 milliGRAM(s) SubCutaneous daily  furosemide    Tablet 20 milliGRAM(s) Oral daily  glucagon  Injectable 1 milliGRAM(s) IntraMuscular once  insulin lispro (ADMELOG) corrective regimen sliding scale   SubCutaneous Before meals and at bedtime  lactulose Syrup 20 Gram(s) Oral every 6 hours  pantoprazole    Tablet 40 milliGRAM(s) Oral before breakfast  polyethylene glycol 3350 17 Gram(s) Oral two times a day  propranolol 10 milliGRAM(s) Oral daily  senna 2 Tablet(s) Oral at bedtime  thiamine 100 milliGRAM(s) Oral daily    MEDICATIONS  (PRN):  oxyCODONE    IR 5 milliGRAM(s) Oral every 4 hours PRN Moderate Pain (4 - 6)  oxyCODONE    IR 10 milliGRAM(s) Oral every 4 hours PRN Severe Pain (7 - 10)    TPro  6.0  /  Alb  3.0<L>  /  TBili  0.4  /  DBili  x   /  AST  20  /  ALT  8<L>  /  AlkPhos  109  07-29        DIET: ccd

## 2021-07-30 NOTE — PROGRESS NOTE ADULT - ASSESSMENT
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

## 2021-07-30 NOTE — PROGRESS NOTE ADULT - SUBJECTIVE AND OBJECTIVE BOX
Anthony was awake and alert as I adjusted his Aspen multi post cervical brace. I washed and replaced the chin liner, and trimmed plastic off the shoulder area to make the brace a little lower by his jaw line, which improved his comfort. Extra liners and contact info were labeled and placed on table. [facility]

## 2021-07-30 NOTE — PROGRESS NOTE ADULT - SUBJECTIVE AND OBJECTIVE BOX
Stanislav Murdock   Pager [phone]  Office [phone]      CC: Patient is a 81y old  Male who presents with a chief complaint of Admitted 7/25 for b/l UE and LE weakness found with severe C2-3 canal stenosis on MRI; 7/26 s/p C2-4 instrumentation and fusion (30 Jul 2021 08:40)      SUBJECTIVE / OVERNIGHT EVENTS:    MEDICATIONS  (STANDING):  cyanocobalamin 1000 MICROGram(s) Oral daily  dexAMETHasone     Tablet 2 milliGRAM(s) Oral every 6 hours  dextrose 50% Injectable 25 Gram(s) IV Push once  dextrose 50% Injectable 12.5 Gram(s) IV Push once  dextrose 50% Injectable 25 Gram(s) IV Push once  enoxaparin Injectable 40 milliGRAM(s) SubCutaneous daily  furosemide    Tablet 20 milliGRAM(s) Oral daily  glucagon  Injectable 1 milliGRAM(s) IntraMuscular once  insulin lispro (ADMELOG) corrective regimen sliding scale   SubCutaneous Before meals and at bedtime  lactulose Syrup 20 Gram(s) Oral every 6 hours  pantoprazole    Tablet 40 milliGRAM(s) Oral before breakfast  polyethylene glycol 3350 17 Gram(s) Oral two times a day  propranolol 10 milliGRAM(s) Oral daily  senna 2 Tablet(s) Oral at bedtime  thiamine 100 milliGRAM(s) Oral daily    MEDICATIONS  (PRN):  oxyCODONE    IR 5 milliGRAM(s) Oral every 4 hours PRN Moderate Pain (4 - 6)  oxyCODONE    IR 10 milliGRAM(s) Oral every 4 hours PRN Severe Pain (7 - 10)      Vital Signs Last 24 Hrs  T(C): 36.4 (30 Jul 2021 08:30), Max: 36.8 (29 Jul 2021 19:35)  T(F): 97.5 (30 Jul 2021 08:30), Max: 98.2 (29 Jul 2021 19:35)  HR: 50 (30 Jul 2021 08:30) (50 - 61)  BP: 136/71 (30 Jul 2021 08:30) (113/72 - 145/80)  BP(mean): --  RR: 16 (30 Jul 2021 08:30) (16 - 18)  SpO2: 99% (30 Jul 2021 08:30) (98% - 99%)  CAPILLARY BLOOD GLUCOSE      POCT Blood Glucose.: 124 mg/dL (30 Jul 2021 07:56)  POCT Blood Glucose.: 107 mg/dL (29 Jul 2021 21:23)  POCT Blood Glucose.: 90 mg/dL (29 Jul 2021 16:56)  POCT Blood Glucose.: 140 mg/dL (29 Jul 2021 11:37)    I&O's Summary    29 Jul 2021 07:01  -  30 Jul 2021 07:00  --------------------------------------------------------  IN: 315 mL / OUT: 450 mL / NET: -135 mL    30 Jul 2021 07:01  -  30 Jul 2021 11:15  --------------------------------------------------------  IN: 360 mL / OUT: 200 mL / NET: 160 mL      tele:    PHYSICAL EXAM:    GENERAL: NAD   HEENT: EOMI, PERRL  PULM: Clear to auscultation bilaterally  CV: Regular rate and rhythm; nl S1, S2; No murmurs, rubs, or gallops  ABDOMEN: Soft, Nontender, Nondistended; Bowel sounds present  EXTREMITIES/MSK:  No edema, calf tenderness   PSYCH: AAOx3  NEUROLOGY: non-focal          LABS:                        10.7   8.60  )-----------( 112      ( 30 Jul 2021 05:00 )             34.6     07-30    135  |  103  |  29<H>  ----------------------------<  129<H>  4.5   |  23  |  0.93    Ca    8.7      30 Jul 2021 05:00    TPro  6.0  /  Alb  3.0<L>  /  TBili  0.4  /  DBili  x   /  AST  20  /  ALT  8<L>  /  AlkPhos  109  07-29    PT/INR - ( 29 Jul 2021 06:37 )   PT: 17.5 sec;   INR: 1.49 ratio         PTT - ( 29 Jul 2021 06:37 )  PTT:24.8 sec            RADIOLOGY & ADDITIONAL TESTS:    Imaging Personally Reviewed:    Consultant(s) Notes Reviewed:      Care Discussed with Consultants/Other Providers:   Stanislav Murdock   Pager [phone]  Office [phone]      CC: Patient is a 81y old  Male who presents with a chief complaint of Admitted 7/25 for b/l UE and LE weakness found with severe C2-3 canal stenosis on MRI; 7/26 s/p C2-4 instrumentation and fusion (30 Jul 2021 08:40)      SUBJECTIVE / OVERNIGHT EVENTS: has mild-mod b/l lateral neck pain; UE strength improved compared to preop. no f/c/r. no HA. no cp/sob. no n/v/abd pain.     MEDICATIONS  (STANDING):  cyanocobalamin 1000 MICROGram(s) Oral daily  dexAMETHasone     Tablet 2 milliGRAM(s) Oral every 6 hours  dextrose 50% Injectable 25 Gram(s) IV Push once  dextrose 50% Injectable 12.5 Gram(s) IV Push once  dextrose 50% Injectable 25 Gram(s) IV Push once  enoxaparin Injectable 40 milliGRAM(s) SubCutaneous daily  furosemide    Tablet 20 milliGRAM(s) Oral daily  glucagon  Injectable 1 milliGRAM(s) IntraMuscular once  insulin lispro (ADMELOG) corrective regimen sliding scale   SubCutaneous Before meals and at bedtime  lactulose Syrup 20 Gram(s) Oral every 6 hours  pantoprazole    Tablet 40 milliGRAM(s) Oral before breakfast  polyethylene glycol 3350 17 Gram(s) Oral two times a day  propranolol 10 milliGRAM(s) Oral daily  senna 2 Tablet(s) Oral at bedtime  thiamine 100 milliGRAM(s) Oral daily    MEDICATIONS  (PRN):  oxyCODONE    IR 5 milliGRAM(s) Oral every 4 hours PRN Moderate Pain (4 - 6)  oxyCODONE    IR 10 milliGRAM(s) Oral every 4 hours PRN Severe Pain (7 - 10)      Vital Signs Last 24 Hrs  T(C): 36.4 (30 Jul 2021 08:30), Max: 36.8 (29 Jul 2021 19:35)  T(F): 97.5 (30 Jul 2021 08:30), Max: 98.2 (29 Jul 2021 19:35)  HR: 50 (30 Jul 2021 08:30) (50 - 61)  BP: 136/71 (30 Jul 2021 08:30) (113/72 - 145/80)  BP(mean): --  RR: 16 (30 Jul 2021 08:30) (16 - 18)  SpO2: 99% (30 Jul 2021 08:30) (98% - 99%)  CAPILLARY BLOOD GLUCOSE      POCT Blood Glucose.: 124 mg/dL (30 Jul 2021 07:56)  POCT Blood Glucose.: 107 mg/dL (29 Jul 2021 21:23)  POCT Blood Glucose.: 90 mg/dL (29 Jul 2021 16:56)  POCT Blood Glucose.: 140 mg/dL (29 Jul 2021 11:37)    I&O's Summary    29 Jul 2021 07:01  -  30 Jul 2021 07:00  --------------------------------------------------------  IN: 315 mL / OUT: 450 mL / NET: -135 mL    30 Jul 2021 07:01  -  30 Jul 2021 11:15  --------------------------------------------------------  IN: 360 mL / OUT: 200 mL / NET: 160 mL          PHYSICAL EXAM:    GENERAL: NAD   HEENT: EOMI, PERRL  PULM: Clear to auscultation bilaterally  CV: Regular rate and rhythm; nl S1, S2; No murmurs, rubs, or gallops  ABDOMEN: Soft, Nontender, Nondistended; Bowel sounds present  EXTREMITIES/MSK:  No edema, calf tenderness   PSYCH: AAOx2 (doesnt know name of hospital and says it's 2001, but agrees   NEUROLOGY: 4/5 b/l UE L>R; 4+/5 b/l LE          LABS:                        10.7   8.60  )-----------( 112      ( 30 Jul 2021 05:00 )             34.6     07-30    135  |  103  |  29<H>  ----------------------------<  129<H>  4.5   |  23  |  0.93    Ca    8.7      30 Jul 2021 05:00    TPro  6.0  /  Alb  3.0<L>  /  TBili  0.4  /  DBili  x   /  AST  20  /  ALT  8<L>  /  AlkPhos  109  07-29    PT/INR - ( 29 Jul 2021 06:37 )   PT: 17.5 sec;   INR: 1.49 ratio         PTT - ( 29 Jul 2021 06:37 )  PTT:24.8 sec            RADIOLOGY & ADDITIONAL TESTS:    Imaging Personally Reviewed:    Consultant(s) Notes Reviewed:      Care Discussed with Consultants/Other Providers:   Stanislav Murdock   Pager [phone]  Office [phone]      CC: Patient is a 81y old  Male who presents with a chief complaint of Admitted 7/25 for b/l UE and LE weakness found with severe C2-3 canal stenosis on MRI; 7/26 s/p C2-4 instrumentation and fusion (30 Jul 2021 08:40)      SUBJECTIVE / OVERNIGHT EVENTS: has mild-mod b/l lateral neck pain; UE strength improved compared to preop. no f/c/r. no HA. no cp/sob. no n/v/abd pain.     MEDICATIONS  (STANDING):  cyanocobalamin 1000 MICROGram(s) Oral daily  dexAMETHasone     Tablet 2 milliGRAM(s) Oral every 6 hours  dextrose 50% Injectable 25 Gram(s) IV Push once  dextrose 50% Injectable 12.5 Gram(s) IV Push once  dextrose 50% Injectable 25 Gram(s) IV Push once  enoxaparin Injectable 40 milliGRAM(s) SubCutaneous daily  furosemide    Tablet 20 milliGRAM(s) Oral daily  glucagon  Injectable 1 milliGRAM(s) IntraMuscular once  insulin lispro (ADMELOG) corrective regimen sliding scale   SubCutaneous Before meals and at bedtime  lactulose Syrup 20 Gram(s) Oral every 6 hours  pantoprazole    Tablet 40 milliGRAM(s) Oral before breakfast  polyethylene glycol 3350 17 Gram(s) Oral two times a day  propranolol 10 milliGRAM(s) Oral daily  senna 2 Tablet(s) Oral at bedtime  thiamine 100 milliGRAM(s) Oral daily    MEDICATIONS  (PRN):  oxyCODONE    IR 5 milliGRAM(s) Oral every 4 hours PRN Moderate Pain (4 - 6)  oxyCODONE    IR 10 milliGRAM(s) Oral every 4 hours PRN Severe Pain (7 - 10)      Vital Signs Last 24 Hrs  T(C): 36.4 (30 Jul 2021 08:30), Max: 36.8 (29 Jul 2021 19:35)  T(F): 97.5 (30 Jul 2021 08:30), Max: 98.2 (29 Jul 2021 19:35)  HR: 50 (30 Jul 2021 08:30) (50 - 61)  BP: 136/71 (30 Jul 2021 08:30) (113/72 - 145/80)  BP(mean): --  RR: 16 (30 Jul 2021 08:30) (16 - 18)  SpO2: 99% (30 Jul 2021 08:30) (98% - 99%)  CAPILLARY BLOOD GLUCOSE      POCT Blood Glucose.: 124 mg/dL (30 Jul 2021 07:56)  POCT Blood Glucose.: 107 mg/dL (29 Jul 2021 21:23)  POCT Blood Glucose.: 90 mg/dL (29 Jul 2021 16:56)  POCT Blood Glucose.: 140 mg/dL (29 Jul 2021 11:37)    I&O's Summary    29 Jul 2021 07:01  -  30 Jul 2021 07:00  --------------------------------------------------------  IN: 315 mL / OUT: 450 mL / NET: -135 mL    30 Jul 2021 07:01  -  30 Jul 2021 11:15  --------------------------------------------------------  IN: 360 mL / OUT: 200 mL / NET: 160 mL          PHYSICAL EXAM:    GENERAL: NAD   HEENT: EOMI, PERRL  PULM: Clear to auscultation bilaterally  CV: Regular rate and rhythm; nl S1, S2; No murmurs, rubs, or gallops  ABDOMEN: Soft, Nontender, Nondistended; Bowel sounds present  EXTREMITIES/MSK:  No edema, calf tenderness   PSYCH: AAOx2 (doesnt know name of hospital and says it's 2001, but agrees it is 2021)  NEUROLOGY: 4/5 b/l UE L>R; 4+/5 b/l LE          LABS:                        10.7   8.60  )-----------( 112      ( 30 Jul 2021 05:00 )             34.6     07-30    135  |  103  |  29<H>  ----------------------------<  129<H>  4.5   |  23  |  0.93    Ca    8.7      30 Jul 2021 05:00    TPro  6.0  /  Alb  3.0<L>  /  TBili  0.4  /  DBili  x   /  AST  20  /  ALT  8<L>  /  AlkPhos  109  07-29    PT/INR - ( 29 Jul 2021 06:37 )   PT: 17.5 sec;   INR: 1.49 ratio         PTT - ( 29 Jul 2021 06:37 )  PTT:24.8 sec            RADIOLOGY & ADDITIONAL TESTS:    Imaging Personally Reviewed:    Consultant(s) Notes Reviewed:      Care Discussed with Consultants/Other Providers: neurosurg

## 2021-07-30 NOTE — PROGRESS NOTE ADULT - PROBLEM SELECTOR PLAN 1
s/p C2-4 posterior decompression and fusion 7/27/21. PT. risks/benefits of pharm DVT prophylaxis at rehab d/w wife and she prefers to stop pharm dvt prophylaxis after hospitalization but further discussions are in progress between surgical team and wife.

## 2021-07-31 LAB
GLUCOSE BLDC GLUCOMTR-MCNC: 102 MG/DL — HIGH (ref 70–99)
GLUCOSE BLDC GLUCOMTR-MCNC: 103 MG/DL — HIGH (ref 70–99)
GLUCOSE BLDC GLUCOMTR-MCNC: 143 MG/DL — HIGH (ref 70–99)
GLUCOSE BLDC GLUCOMTR-MCNC: 95 MG/DL — SIGNIFICANT CHANGE UP (ref 70–99)

## 2021-07-31 PROCEDURE — 99232 SBSQ HOSP IP/OBS MODERATE 35: CPT

## 2021-07-31 RX ORDER — DEXAMETHASONE 0.5 MG/5ML
2 ELIXIR ORAL EVERY 12 HOURS
Refills: 0 | Status: DISCONTINUED | OUTPATIENT
Start: 2021-07-31 | End: 2021-08-02

## 2021-07-31 RX ADMIN — Medication 2 MILLIGRAM(S): at 05:11

## 2021-07-31 RX ADMIN — SENNA PLUS 2 TABLET(S): 8.6 TABLET ORAL at 23:19

## 2021-07-31 RX ADMIN — LACTULOSE 20 GRAM(S): 10 SOLUTION ORAL at 23:19

## 2021-07-31 RX ADMIN — Medication 100 MILLIGRAM(S): at 12:11

## 2021-07-31 RX ADMIN — ENOXAPARIN SODIUM 40 MILLIGRAM(S): 100 INJECTION SUBCUTANEOUS at 12:10

## 2021-07-31 RX ADMIN — PANTOPRAZOLE SODIUM 40 MILLIGRAM(S): 20 TABLET, DELAYED RELEASE ORAL at 05:11

## 2021-07-31 RX ADMIN — Medication 20 MILLIGRAM(S): at 05:11

## 2021-07-31 RX ADMIN — Medication 2 MILLIGRAM(S): at 12:11

## 2021-07-31 RX ADMIN — POLYETHYLENE GLYCOL 3350 17 GRAM(S): 17 POWDER, FOR SOLUTION ORAL at 05:11

## 2021-07-31 RX ADMIN — POLYETHYLENE GLYCOL 3350 17 GRAM(S): 17 POWDER, FOR SOLUTION ORAL at 18:29

## 2021-07-31 RX ADMIN — LACTULOSE 20 GRAM(S): 10 SOLUTION ORAL at 18:29

## 2021-07-31 RX ADMIN — PREGABALIN 1000 MICROGRAM(S): 225 CAPSULE ORAL at 12:11

## 2021-07-31 RX ADMIN — LACTULOSE 20 GRAM(S): 10 SOLUTION ORAL at 05:10

## 2021-07-31 RX ADMIN — Medication 2 MILLIGRAM(S): at 18:30

## 2021-07-31 RX ADMIN — LACTULOSE 20 GRAM(S): 10 SOLUTION ORAL at 12:10

## 2021-07-31 NOTE — PROGRESS NOTE ADULT - ASSESSMENT
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 Currently in bed, awake and alert. Pain controlled with meds. Able to move all extremities. 1 good BM yesterday.     I spoke to pt in his native [place].

## 2021-07-31 NOTE — PROGRESS NOTE ADULT - ASSESSMENT
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  - Encouraged mobilization and discussed with patient the importance of mobilization post operatively    PULM:  - Encouraged incentive spirometry  - Tolerating room air    CV:  - Continue Lasix 20mg daily and Propranolol 10mg daily for history of HTN  - Goal normotension    GI:  - Tolerating regular consistency diet  - GI prophylaxis [] not indicated [x] PPI - hx of duodenal ulcer [] other:  - Bowel regimen [] colace [x] senna [] other: miralax, lactulose (home med) goal 2-3 BMs/day for history of cirrhosis  - Follows with hepatology as outpatient    RENAL:  - IVL  - replete lytes PRN    ENDO:   - Goal euglycemia (-180)  - ISS TID AC    HEME/ONC:   - VTE prophylaxis: [x] SCDs [x] chemoprophylaxis : on SQL [] hold chemoprophylaxis due to: [] high risk of DVT/PE on admission due to:  - Given history of bleeding and coagulopathy secondary to cirrhosis, hospitalist recommending not to continue chemical DVT ppx while at rehab and encourage mobilization. However, the risk of VTE also exists and will discuss with patient and wife continuing VTE ppx for a specified amount of time      ID:  - Afebrile  - Received covid vaccine    MISC:   - RT shoulder xray done to r/o fracture given c/o RT shoulder pain, awaiting results    DISPO:   - PT[facility]OT[facility]  - Will discuss with Dr. Knott  [id number]

## 2021-07-31 NOTE — PROGRESS NOTE ADULT - SUBJECTIVE AND OBJECTIVE BOX
SUBJECTIVE:     OVERNIGHT EVENTS:     Vital Signs Last 24 Hrs  T(C): 37.1 (31 Jul 2021 07:41), Max: 37.1 (31 Jul 2021 07:41)  T(F): 98.7 (31 Jul 2021 07:41), Max: 98.7 (31 Jul 2021 07:41)  HR: 66 (31 Jul 2021 07:41) (54 - 66)  BP: 116/77 (31 Jul 2021 07:41) (115/67 - 137/80)  BP(mean): --  RR: 18 (31 Jul 2021 07:41) (16 - 18)  SpO2: 97% (31 Jul 2021 07:41) (97% - 99%)    PHYSICAL EXAM:    LABS:                        10.7   8.60  )-----------( 112      ( 30 Jul 2021 05:00 )             34.6    07-30    135  |  103  |  29<H>  ----------------------------<  129<H>  4.5   |  23  |  0.93    Ca    8.7      30 Jul 2021 05:00          07-30 @ 07:01  -  07-31 @ 07:00  --------------------------------------------------------  IN: 1350 mL / OUT: 200 mL / NET: 1150 mL      DRAINS:     MEDICATIONS:  Antibiotics:    Neuro:  oxyCODONE    IR 5 milliGRAM(s) Oral every 4 hours PRN Moderate Pain (4 - 6)  oxyCODONE    IR 10 milliGRAM(s) Oral every 4 hours PRN Severe Pain (7 - 10)    Cardiac:  furosemide    Tablet 20 milliGRAM(s) Oral daily  propranolol 10 milliGRAM(s) Oral daily    Pulm:    GI/:  lactulose Syrup 20 Gram(s) Oral every 6 hours  pantoprazole    Tablet 40 milliGRAM(s) Oral before breakfast  polyethylene glycol 3350 17 Gram(s) Oral two times a day  senna 2 Tablet(s) Oral at bedtime    Other:   cyanocobalamin 1000 MICROGram(s) Oral daily  dexAMETHasone     Tablet 2 milliGRAM(s) Oral every 6 hours  dextrose 50% Injectable 25 Gram(s) IV Push once  dextrose 50% Injectable 12.5 Gram(s) IV Push once  dextrose 50% Injectable 25 Gram(s) IV Push once  enoxaparin Injectable 40 milliGRAM(s) SubCutaneous daily  glucagon  Injectable 1 milliGRAM(s) IntraMuscular once  insulin lispro (ADMELOG) corrective regimen sliding scale   SubCutaneous Before meals and at bedtime  thiamine 100 milliGRAM(s) Oral daily    DIET: [] Regular [] CCD [] Renal [] Puree [] Dysphagia [] Tube Feeds:     IMAGING:    SUBJECTIVE:   doing well, looking forward to going to rehab. wants to get OOB  OVERNIGHT EVENTS:   t[place]  Vital Signs Last 24 Hrs  T(C): 37.1 (31 Jul 2021 07:41), Max: 37.1 (31 Jul 2021 07:41)  T(F): 98.7 (31 Jul 2021 07:41), Max: 98.7 (31 Jul 2021 07:41)  HR: 66 (31 Jul 2021 07:41) (54 - 66)  BP: 116/77 (31 Jul 2021 07:41) (115/67 - 137/80)  BP(mean): --  RR: 18 (31 Jul 2021 07:41) (16 - 18)  SpO2: 97% (31 Jul 2021 07:41) (97% - 99%)    PHYSICAL EXAM:  awake, alert, aaox2, UE 4/5, LE 4+/5, in a collar, dressing on the incision.     LABS:                        10.7   8.60  )-----------( 112      ( 30 Jul 2021 05:00 )             34.6    07-30    135  |  103  |  29<H>  ----------------------------<  129<H>  4.5   |  23  |  0.93    Ca    8.7      30 Jul 2021 05:00          07-30 @ 07:01  -  07-31 @ 07:00  --------------------------------------------------------  IN: 1350 mL / OUT: 200 mL / NET: 1150 mL      DRAINS:     MEDICATIONS:  Antibiotics:    Neuro:  oxyCODONE    IR 5 milliGRAM(s) Oral every 4 hours PRN Moderate Pain (4 - 6)  oxyCODONE    IR 10 milliGRAM(s) Oral every 4 hours PRN Severe Pain (7 - 10)    Cardiac:  furosemide    Tablet 20 milliGRAM(s) Oral daily  propranolol 10 milliGRAM(s) Oral daily    Pulm:    GI/:  lactulose Syrup 20 Gram(s) Oral every 6 hours  pantoprazole    Tablet 40 milliGRAM(s) Oral before breakfast  polyethylene glycol 3350 17 Gram(s) Oral two times a day  senna 2 Tablet(s) Oral at bedtime    Other:   cyanocobalamin 1000 MICROGram(s) Oral daily  dexAMETHasone     Tablet 2 milliGRAM(s) Oral every 6 hours  dextrose 50% Injectable 25 Gram(s) IV Push once  dextrose 50% Injectable 12.5 Gram(s) IV Push once  dextrose 50% Injectable 25 Gram(s) IV Push once  enoxaparin Injectable 40 milliGRAM(s) SubCutaneous daily  glucagon  Injectable 1 milliGRAM(s) IntraMuscular once  insulin lispro (ADMELOG) corrective regimen sliding scale   SubCutaneous Before meals and at bedtime  thiamine 100 milliGRAM(s) Oral daily    DIET: [] Regular [] CCD [] Renal [] Puree [] Dysphagia [] Tube Feeds:     IMAGING:

## 2021-07-31 NOTE — PROGRESS NOTE ADULT - PROBLEM SELECTOR PLAN 1
S/p surgery as above. Continue postop pain control, PT. On Decadron taper for spinal cord edema. S/p surgery as above. Continue postop pain control, PT. On Decadron taper for spinal cord edema.    Continue VTE prophylaxis until ambulation improves.

## 2021-07-31 NOTE — PROGRESS NOTE ADULT - PROBLEM SELECTOR PLAN 3
cont PPI.    A1C 5.3, fingersticks have been well controlled, can discontinue. Cont PPI.    A1C 5.3, fingersticks have been well controlled, can discontinue.      Awaiting rehab placement.

## 2021-07-31 NOTE — PROGRESS NOTE ADULT - SUBJECTIVE AND OBJECTIVE BOX
Patient is a 81y old  Male who presents with a chief complaint of weakness (30 Jul 2021 12:28)      SUBJECTIVE / OVERNIGHT EVENTS: Pt in bed, awake and alert, denies pain.     Vital Signs Last 24 Hrs  T(C): 37.1 (31 Jul 2021 07:41), Max: 37.1 (31 Jul 2021 07:41)  T(F): 98.7 (31 Jul 2021 07:41), Max: 98.7 (31 Jul 2021 07:41)  HR: 66 (31 Jul 2021 07:41) (54 - 66)  BP: 116/77 (31 Jul 2021 07:41) (115/67 - 137/80)  BP(mean): --  RR: 18 (31 Jul 2021 07:41) (16 - 18)  SpO2: 97% (31 Jul 2021 07:41) (97% - 99%)    MEDICATIONS  (STANDING):  cyanocobalamin 1000 MICROGram(s) Oral daily  dexAMETHasone     Tablet 2 milliGRAM(s) Oral every 6 hours  dextrose 50% Injectable 25 Gram(s) IV Push once  dextrose 50% Injectable 12.5 Gram(s) IV Push once  dextrose 50% Injectable 25 Gram(s) IV Push once  enoxaparin Injectable 40 milliGRAM(s) SubCutaneous daily  furosemide    Tablet 20 milliGRAM(s) Oral daily  glucagon  Injectable 1 milliGRAM(s) IntraMuscular once  insulin lispro (ADMELOG) corrective regimen sliding scale   SubCutaneous Before meals and at bedtime  lactulose Syrup 20 Gram(s) Oral every 6 hours  pantoprazole    Tablet 40 milliGRAM(s) Oral before breakfast  polyethylene glycol 3350 17 Gram(s) Oral two times a day  propranolol 10 milliGRAM(s) Oral daily  senna 2 Tablet(s) Oral at bedtime  thiamine 100 milliGRAM(s) Oral daily    MEDICATIONS  (PRN):  oxyCODONE    IR 5 milliGRAM(s) Oral every 4 hours PRN Moderate Pain (4 - 6)  oxyCODONE    IR 10 milliGRAM(s) Oral every 4 hours PRN Severe Pain (7 - 10)        CAPILLARY BLOOD GLUCOSE      POCT Blood Glucose.: 103 mg/dL (31 Jul 2021 07:45)  POCT Blood Glucose.: 119 mg/dL (30 Jul 2021 21:02)  POCT Blood Glucose.: 101 mg/dL (30 Jul 2021 16:34)  POCT Blood Glucose.: 149 mg/dL (30 Jul 2021 11:26)    I&O's Summary    30 Jul 2021 07:01  -  31 Jul 2021 07:00  --------------------------------------------------------  IN: 1350 mL / OUT: 200 mL / NET: 1150 mL        PHYSICAL EXAM:  GENERAL: NAD, well-developed  HEAD:  Atraumatic, Normocephalic  EYES: EOMI, PERRLA, conjunctiva and sclera clear  NECK: Neck collar in place  CHEST/LUNG: Clear to auscultation anteriorly  HEART: Regular rate and rhythm; No murmurs, rubs, or gallops  ABDOMEN: Soft, Nontender, Nondistended; Bowel sounds present  EXTREMITIES:  2+ Peripheral Pulses, No clubbing, cyanosis, or edema  PSYCH: AAOx3  NEUROLOGY: Moving all extremities  SKIN: No rashes or lesions    LABS:                        10.7   8.60  )-----------( 112      ( 30 Jul 2021 05:00 )             34.6     07-30    135  |  103  |  29<H>  ----------------------------<  129<H>  4.5   |  23  |  0.93    Ca    8.7      30 Jul 2021 05:00      RADIOLOGY & ADDITIONAL TESTS:    Imaging Personally Reviewed:    Consultant(s) Notes Reviewed:      Care Discussed with Consultants/Other Providers: Kavitha

## 2021-08-01 RX ORDER — METHOCARBAMOL 500 MG/1
750 TABLET, FILM COATED ORAL EVERY 12 HOURS
Refills: 0 | Status: DISCONTINUED | OUTPATIENT
Start: 2021-08-01 | End: 2021-08-03

## 2021-08-01 RX ADMIN — LACTULOSE 20 GRAM(S): 10 SOLUTION ORAL at 05:29

## 2021-08-01 RX ADMIN — OXYCODONE HYDROCHLORIDE 5 MILLIGRAM(S): 5 TABLET ORAL at 17:01

## 2021-08-01 RX ADMIN — LACTULOSE 20 GRAM(S): 10 SOLUTION ORAL at 17:08

## 2021-08-01 RX ADMIN — POLYETHYLENE GLYCOL 3350 17 GRAM(S): 17 POWDER, FOR SOLUTION ORAL at 17:02

## 2021-08-01 RX ADMIN — METHOCARBAMOL 750 MILLIGRAM(S): 500 TABLET, FILM COATED ORAL at 17:01

## 2021-08-01 RX ADMIN — POLYETHYLENE GLYCOL 3350 17 GRAM(S): 17 POWDER, FOR SOLUTION ORAL at 05:29

## 2021-08-01 RX ADMIN — Medication 2 MILLIGRAM(S): at 05:29

## 2021-08-01 RX ADMIN — PREGABALIN 1000 MICROGRAM(S): 225 CAPSULE ORAL at 17:01

## 2021-08-01 RX ADMIN — Medication 2 MILLIGRAM(S): at 17:01

## 2021-08-01 RX ADMIN — SENNA PLUS 2 TABLET(S): 8.6 TABLET ORAL at 23:21

## 2021-08-01 RX ADMIN — PANTOPRAZOLE SODIUM 40 MILLIGRAM(S): 20 TABLET, DELAYED RELEASE ORAL at 07:50

## 2021-08-01 RX ADMIN — ENOXAPARIN SODIUM 40 MILLIGRAM(S): 100 INJECTION SUBCUTANEOUS at 12:12

## 2021-08-01 RX ADMIN — OXYCODONE HYDROCHLORIDE 5 MILLIGRAM(S): 5 TABLET ORAL at 17:31

## 2021-08-01 RX ADMIN — Medication 20 MILLIGRAM(S): at 05:29

## 2021-08-01 RX ADMIN — LACTULOSE 20 GRAM(S): 10 SOLUTION ORAL at 23:21

## 2021-08-01 RX ADMIN — Medication 100 MILLIGRAM(S): at 17:02

## 2021-08-01 NOTE — PROGRESS NOTE ADULT - SUBJECTIVE AND OBJECTIVE BOX
SUBJECTIVE:     OVERNIGHT EVENTS: none    Vital Signs Last 24 Hrs  T(C): 37.1 (01 Aug 2021 11:42), Max: 37.1 (01 Aug 2021 07:45)  T(F): 98.7 (01 Aug 2021 11:42), Max: 98.8 (01 Aug 2021 07:45)  HR: 70 (01 Aug 2021 11:42) (57 - 70)  BP: 119/74 (01 Aug 2021 11:42) (103/68 - 133/83)  BP(mean): --  RR: 18 (01 Aug 2021 11:42) (18 - 18)  SpO2: 99% (01 Aug 2021 11:42) (97% - 99%)    PHYSICAL EXAM:    Constitutional: No Acute Distress     Neurological: Awake alert Ox3, Speech clear Following Commands, Moving all Extremities . B/L UE 4/5 . b/l HF 3/5 KF 4/5 KE 4/5 DF/PF 4/5 . Posterior cervical incison aquacel AG C/D/I     Pulmonary: Clear to Auscultation,     Cardiovascular: S1, S2, Regular rate and rhythm     Gastrointestinal: Soft, Non-tender, Non-distended     Extremities: No calf tenderness       LABS:         No new labs     IMAGING:         MEDICATIONS:    methocarbamol 750 milliGRAM(s) Oral every 12 hours  oxyCODONE    IR 5 milliGRAM(s) Oral every 4 hours PRN Moderate Pain (4 - 6)  oxyCODONE    IR 10 milliGRAM(s) Oral every 4 hours PRN Severe Pain (7 - 10)  furosemide    Tablet 20 milliGRAM(s) Oral daily  propranolol 10 milliGRAM(s) Oral daily  lactulose Syrup 20 Gram(s) Oral every 6 hours  pantoprazole    Tablet 40 milliGRAM(s) Oral before breakfast  polyethylene glycol 3350 17 Gram(s) Oral two times a day  senna 2 Tablet(s) Oral at bedtime  cyanocobalamin 1000 MICROGram(s) Oral daily  dexAMETHasone     Tablet 2 milliGRAM(s) Oral every 12 hours  enoxaparin Injectable 40 milliGRAM(s) SubCutaneous daily  glucagon  Injectable 1 milliGRAM(s) IntraMuscular once  thiamine 100 milliGRAM(s) Oral daily      DIET:

## 2021-08-01 NOTE — PROGRESS NOTE ADULT - ASSESSMENT
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 Post op course uneventful    Plan    Neuro stable. Taper steroids off . C collar at all times   Pain control-  very minimal effort w PT stating pain issues. pt encouraged to take pain meds. Start Robaxin ATC & continue Oxy IR prn  Liver cirrhosis - Lactulose daily. On Lasix. propranalol for portal hypertension . . Continue PPI  am labs   ROBINSON pending

## 2021-08-02 LAB
ANION GAP SERPL CALC-SCNC: 9 MMOL/L — SIGNIFICANT CHANGE UP (ref 5–17)
BASOPHILS # BLD AUTO: 0 K/UL — SIGNIFICANT CHANGE UP (ref 0–0.2)
BASOPHILS NFR BLD AUTO: 0 % — SIGNIFICANT CHANGE UP (ref 0–2)
BUN SERPL-MCNC: 34 MG/DL — HIGH (ref 7–23)
CALCIUM SERPL-MCNC: 8.8 MG/DL — SIGNIFICANT CHANGE UP (ref 8.4–10.5)
CHLORIDE SERPL-SCNC: 99 MMOL/L — SIGNIFICANT CHANGE UP (ref 96–108)
CO2 SERPL-SCNC: 26 MMOL/L — SIGNIFICANT CHANGE UP (ref 22–31)
CREAT SERPL-MCNC: 1.01 MG/DL — SIGNIFICANT CHANGE UP (ref 0.5–1.3)
EOSINOPHIL # BLD AUTO: 0 K/UL — SIGNIFICANT CHANGE UP (ref 0–0.5)
EOSINOPHIL NFR BLD AUTO: 0 % — SIGNIFICANT CHANGE UP (ref 0–6)
GLUCOSE SERPL-MCNC: 99 MG/DL — SIGNIFICANT CHANGE UP (ref 70–99)
HCT VFR BLD CALC: 34.6 % — LOW (ref 39–50)
HGB BLD-MCNC: 11 G/DL — LOW (ref 13–17)
LYMPHOCYTES # BLD AUTO: 0.43 K/UL — LOW (ref 1–3.3)
LYMPHOCYTES # BLD AUTO: 4.4 % — LOW (ref 13–44)
MANUAL SMEAR VERIFICATION: SIGNIFICANT CHANGE UP
MCHC RBC-ENTMCNC: 25.9 PG — LOW (ref 27–34)
MCHC RBC-ENTMCNC: 31.8 GM/DL — LOW (ref 32–36)
MCV RBC AUTO: 81.4 FL — SIGNIFICANT CHANGE UP (ref 80–100)
MONOCYTES # BLD AUTO: 0.77 K/UL — SIGNIFICANT CHANGE UP (ref 0–0.9)
MONOCYTES NFR BLD AUTO: 7.9 % — SIGNIFICANT CHANGE UP (ref 2–14)
NEUTROPHILS # BLD AUTO: 8.45 K/UL — HIGH (ref 1.8–7.4)
NEUTROPHILS NFR BLD AUTO: 86.8 % — HIGH (ref 43–77)
NEUTS HYPERSEG # BLD: PRESENT — SIGNIFICANT CHANGE UP
PLAT MORPH BLD: NORMAL — SIGNIFICANT CHANGE UP
PLATELET # BLD AUTO: 173 K/UL — SIGNIFICANT CHANGE UP (ref 150–400)
POTASSIUM SERPL-MCNC: 4.4 MMOL/L — SIGNIFICANT CHANGE UP (ref 3.5–5.3)
POTASSIUM SERPL-SCNC: 4.4 MMOL/L — SIGNIFICANT CHANGE UP (ref 3.5–5.3)
RBC # BLD: 4.25 M/UL — SIGNIFICANT CHANGE UP (ref 4.2–5.8)
RBC # FLD: 15.9 % — HIGH (ref 10.3–14.5)
RBC BLD AUTO: SIGNIFICANT CHANGE UP
SARS-COV-2 RNA SPEC QL NAA+PROBE: SIGNIFICANT CHANGE UP
SODIUM SERPL-SCNC: 134 MMOL/L — LOW (ref 135–145)
VARIANT LYMPHS # BLD: 0.9 % — SIGNIFICANT CHANGE UP (ref 0–6)
WBC # BLD: 9.74 K/UL — SIGNIFICANT CHANGE UP (ref 3.8–10.5)
WBC # FLD AUTO: 9.74 K/UL — SIGNIFICANT CHANGE UP (ref 3.8–10.5)

## 2021-08-02 PROCEDURE — 99232 SBSQ HOSP IP/OBS MODERATE 35: CPT

## 2021-08-02 RX ADMIN — METHOCARBAMOL 750 MILLIGRAM(S): 500 TABLET, FILM COATED ORAL at 17:44

## 2021-08-02 RX ADMIN — METHOCARBAMOL 750 MILLIGRAM(S): 500 TABLET, FILM COATED ORAL at 05:05

## 2021-08-02 RX ADMIN — POLYETHYLENE GLYCOL 3350 17 GRAM(S): 17 POWDER, FOR SOLUTION ORAL at 05:05

## 2021-08-02 RX ADMIN — LACTULOSE 20 GRAM(S): 10 SOLUTION ORAL at 05:05

## 2021-08-02 RX ADMIN — LACTULOSE 20 GRAM(S): 10 SOLUTION ORAL at 23:03

## 2021-08-02 RX ADMIN — Medication 100 MILLIGRAM(S): at 12:55

## 2021-08-02 RX ADMIN — Medication 20 MILLIGRAM(S): at 05:05

## 2021-08-02 RX ADMIN — ENOXAPARIN SODIUM 40 MILLIGRAM(S): 100 INJECTION SUBCUTANEOUS at 12:55

## 2021-08-02 RX ADMIN — LACTULOSE 20 GRAM(S): 10 SOLUTION ORAL at 12:56

## 2021-08-02 RX ADMIN — LACTULOSE 20 GRAM(S): 10 SOLUTION ORAL at 17:44

## 2021-08-02 RX ADMIN — PANTOPRAZOLE SODIUM 40 MILLIGRAM(S): 20 TABLET, DELAYED RELEASE ORAL at 05:05

## 2021-08-02 RX ADMIN — POLYETHYLENE GLYCOL 3350 17 GRAM(S): 17 POWDER, FOR SOLUTION ORAL at 17:44

## 2021-08-02 RX ADMIN — PREGABALIN 1000 MICROGRAM(S): 225 CAPSULE ORAL at 12:55

## 2021-08-02 RX ADMIN — Medication 2 MILLIGRAM(S): at 05:05

## 2021-08-02 RX ADMIN — SENNA PLUS 2 TABLET(S): 8.6 TABLET ORAL at 23:03

## 2021-08-02 NOTE — PROGRESS NOTE ADULT - SUBJECTIVE AND OBJECTIVE BOX
SUBJECTIVE:   Cervical collar on.   OVERNIGHT EVENTS: none    Vital Signs Last 24 Hrs  T(C): 37.1 (02 Aug 2021 08:43), Max: 37.1 (02 Aug 2021 08:43)  T(F): 98.7 (02 Aug 2021 08:43), Max: 98.7 (02 Aug 2021 08:43)  HR: 64 (02 Aug 2021 08:43) (55 - 64)  BP: 136/74 (02 Aug 2021 08:43) (117/76 - 136/74)  BP(mean): --  RR: 18 (02 Aug 2021 08:43) (18 - 18)  SpO2: 98% (02 Aug 2021 08:43) (96% - 100%)    PHYSICAL EXAM:      Neurological: Awake alert Ox3, Speech clear Following Commands, Moving all Extremities . RUE 4/5 LLE 4+/5 . b/l HF 3/5 KF 4/5 KE 4/5 DF/PF 4/5 . Posterior cervical incison C/D/I     Pulmonary: Clear to Auscultation,     Cardiovascular: S1, S2, Regular rate and rhythm     Gastrointestinal: Soft, Non-tender, Non-distended     Extremities: No calf tenderness       LABS:                        11.0   9.74  )-----------( 173      ( 02 Aug 2021 06:18 )             34.6    08-02    134<L>  |  99  |  34<H>  ----------------------------<  99  4.4   |  26  |  1.01    Ca    8.8      02 Aug 2021 06:18        IMAGING:         MEDICATIONS:    methocarbamol 750 milliGRAM(s) Oral every 12 hours  oxyCODONE    IR 5 milliGRAM(s) Oral every 4 hours PRN Moderate Pain (4 - 6)  oxyCODONE    IR 10 milliGRAM(s) Oral every 4 hours PRN Severe Pain (7 - 10)  furosemide    Tablet 20 milliGRAM(s) Oral daily  propranolol 10 milliGRAM(s) Oral daily  lactulose Syrup 20 Gram(s) Oral every 6 hours  pantoprazole    Tablet 40 milliGRAM(s) Oral before breakfast  polyethylene glycol 3350 17 Gram(s) Oral two times a day  senna 2 Tablet(s) Oral at bedtime  cyanocobalamin 1000 MICROGram(s) Oral daily  enoxaparin Injectable 40 milliGRAM(s) SubCutaneous daily  glucagon  Injectable 1 milliGRAM(s) IntraMuscular once  thiamine 100 milliGRAM(s) Oral daily      DIET:

## 2021-08-02 NOTE — PROGRESS NOTE ADULT - ASSESSMENT
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 Post op course uneventful    Plan    Neuro stable. Taper steroids off . C collar at all times Aquacel AG dressing changed today   Pain control-   pt encouraged to take pain meds. On Robaxin ATC & continue Oxy IR prn  Liver cirrhosis - Lactulose daily. On Lasix. propranolol for portal hypertension . . Continue PPI. Medicine follow up appreciated   DVT ppx   ROBINSON pending . Awaiting bed

## 2021-08-02 NOTE — PROGRESS NOTE ADULT - PROBLEM SELECTOR PLAN 3
Cont PPI.    A1C 5.3, fingersticks have been well controlled, discontinued.      Awaiting rehab placement.

## 2021-08-02 NOTE — PROGRESS NOTE ADULT - PROBLEM SELECTOR PLAN 2
lactulose ATC and titrate to 2-3 BMs/day to prevent hepatic encephalopathy. cont Lasix.
Lactulose ATC and titrate to 2-3 BMs/day to prevent hepatic encephalopathy. Cont Lasix.    On Inderal for h/o variceal bleeding.
Lactulose ATC and titrate to 2-3 BMs/day to prevent hepatic encephalopathy. Cont Lasix.    On Inderal for h/o variceal bleeding.
lactulose ATC and titrate to 2-3 BMs/day to prevent hepatic encephalopathy. cont Lasix.
lactulose ATC and titrate to 2-3 BMs/day to prevent hepatic encephalopathy. d/c IVF and restart Lasix when ok from neurosurg standpoint. daily coags and LFTs

## 2021-08-02 NOTE — PROGRESS NOTE ADULT - SUBJECTIVE AND OBJECTIVE BOX
Patient is a 81y old  Male who presents with a chief complaint of weakness (02 Aug 2021 13:00)      SUBJECTIVE / OVERNIGHT EVENTS: Pt in bed, c/o generalized weakness.     Vital Signs Last 24 Hrs  T(C): 37.1 (02 Aug 2021 08:43), Max: 37.1 (02 Aug 2021 08:43)  T(F): 98.7 (02 Aug 2021 08:43), Max: 98.7 (02 Aug 2021 08:43)  HR: 64 (02 Aug 2021 08:43) (55 - 64)  BP: 136/74 (02 Aug 2021 08:43) (117/76 - 136/74)  BP(mean): --  RR: 18 (02 Aug 2021 08:43) (18 - 18)  SpO2: 98% (02 Aug 2021 08:43) (96% - 100%)    MEDICATIONS  (STANDING):  cyanocobalamin 1000 MICROGram(s) Oral daily  enoxaparin Injectable 40 milliGRAM(s) SubCutaneous daily  furosemide    Tablet 20 milliGRAM(s) Oral daily  glucagon  Injectable 1 milliGRAM(s) IntraMuscular once  lactulose Syrup 20 Gram(s) Oral every 6 hours  methocarbamol 750 milliGRAM(s) Oral every 12 hours  pantoprazole    Tablet 40 milliGRAM(s) Oral before breakfast  polyethylene glycol 3350 17 Gram(s) Oral two times a day  propranolol 10 milliGRAM(s) Oral daily  senna 2 Tablet(s) Oral at bedtime  thiamine 100 milliGRAM(s) Oral daily    MEDICATIONS  (PRN):  oxyCODONE    IR 5 milliGRAM(s) Oral every 4 hours PRN Moderate Pain (4 - 6)  oxyCODONE    IR 10 milliGRAM(s) Oral every 4 hours PRN Severe Pain (7 - 10)        CAPILLARY BLOOD GLUCOSE        I&O's Summary    01 Aug 2021 07:01  -  02 Aug 2021 07:00  --------------------------------------------------------  IN: 580 mL / OUT: 0 mL / NET: 580 mL        PHYSICAL EXAM:  GENERAL: NAD, well-developed  HEAD:  Atraumatic, Normocephalic  EYES: EOMI, PERRLA, conjunctiva and sclera clear  NECK: Supple, No JVD  CHEST/LUNG: Clear to auscultation anteriorly  HEART: Regular rate and rhythm; No murmurs, rubs, or gallops  ABDOMEN: Soft, Nontender, Nondistended; Bowel sounds present  EXTREMITIES:  2+ Peripheral Pulses, No clubbing, cyanosis, or edema  PSYCH: AAOx3  NEUROLOGY: generalized weakness  SKIN: No rashes or lesions    LABS:                        11.0   9.74  )-----------( 173      ( 02 Aug 2021 06:18 )             34.6     08-02    134<L>  |  99  |  34<H>  ----------------------------<  99  4.4   |  26  |  1.01    Ca    8.8      02 Aug 2021 06:18          RADIOLOGY & ADDITIONAL TESTS:    Imaging Personally Reviewed:    Consultant(s) Notes Reviewed:      Care Discussed with Consultants/Other Providers: Kavitha

## 2021-08-02 NOTE — PROGRESS NOTE ADULT - PROBLEM SELECTOR PLAN 1
S/p surgery as above. Continue postop pain control, PT. On Decadron taper for spinal cord edema.    Continue VTE prophylaxis until ambulation improves.

## 2021-08-02 NOTE — PROGRESS NOTE ADULT - ASSESSMENT
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 Currently in bed, awake and alert, c/o generalized weakness.     I spoke to pt in his native [place]ian.

## 2021-08-03 ENCOUNTER — TRANSCRIPTION ENCOUNTER (OUTPATIENT)
Age: 81
End: 2021-08-03

## 2021-08-03 VITALS
HEART RATE: 64 BPM | SYSTOLIC BLOOD PRESSURE: 121 MMHG | RESPIRATION RATE: 18 BRPM | OXYGEN SATURATION: 97 % | TEMPERATURE: 99 F | DIASTOLIC BLOOD PRESSURE: 68 MMHG

## 2021-08-03 PROCEDURE — 85610 PROTHROMBIN TIME: CPT

## 2021-08-03 PROCEDURE — 82803 BLOOD GASES ANY COMBINATION: CPT

## 2021-08-03 PROCEDURE — 97163 PT EVAL HIGH COMPLEX 45 MIN: CPT

## 2021-08-03 PROCEDURE — C1889: CPT

## 2021-08-03 PROCEDURE — C1769: CPT

## 2021-08-03 PROCEDURE — 84100 ASSAY OF PHOSPHORUS: CPT

## 2021-08-03 PROCEDURE — 97530 THERAPEUTIC ACTIVITIES: CPT

## 2021-08-03 PROCEDURE — C1713: CPT

## 2021-08-03 PROCEDURE — 76000 FLUOROSCOPY <1 HR PHYS/QHP: CPT

## 2021-08-03 PROCEDURE — 85018 HEMOGLOBIN: CPT

## 2021-08-03 PROCEDURE — 86850 RBC ANTIBODY SCREEN: CPT

## 2021-08-03 PROCEDURE — 86769 SARS-COV-2 COVID-19 ANTIBODY: CPT

## 2021-08-03 PROCEDURE — 84132 ASSAY OF SERUM POTASSIUM: CPT

## 2021-08-03 PROCEDURE — 73030 X-RAY EXAM OF SHOULDER: CPT

## 2021-08-03 PROCEDURE — U0005: CPT

## 2021-08-03 PROCEDURE — 87641 MR-STAPH DNA AMP PROBE: CPT

## 2021-08-03 PROCEDURE — 83735 ASSAY OF MAGNESIUM: CPT

## 2021-08-03 PROCEDURE — 97165 OT EVAL LOW COMPLEX 30 MIN: CPT

## 2021-08-03 PROCEDURE — 83605 ASSAY OF LACTIC ACID: CPT

## 2021-08-03 PROCEDURE — 84295 ASSAY OF SERUM SODIUM: CPT

## 2021-08-03 PROCEDURE — P9059: CPT

## 2021-08-03 PROCEDURE — 82947 ASSAY GLUCOSE BLOOD QUANT: CPT

## 2021-08-03 PROCEDURE — U0003: CPT

## 2021-08-03 PROCEDURE — 86900 BLOOD TYPING SEROLOGIC ABO: CPT

## 2021-08-03 PROCEDURE — 82435 ASSAY OF BLOOD CHLORIDE: CPT

## 2021-08-03 PROCEDURE — 80048 BASIC METABOLIC PNL TOTAL CA: CPT

## 2021-08-03 PROCEDURE — 85025 COMPLETE CBC W/AUTO DIFF WBC: CPT

## 2021-08-03 PROCEDURE — 82962 GLUCOSE BLOOD TEST: CPT

## 2021-08-03 PROCEDURE — 93970 EXTREMITY STUDY: CPT

## 2021-08-03 PROCEDURE — 85027 COMPLETE CBC AUTOMATED: CPT

## 2021-08-03 PROCEDURE — 36430 TRANSFUSION BLD/BLD COMPNT: CPT

## 2021-08-03 PROCEDURE — 97110 THERAPEUTIC EXERCISES: CPT

## 2021-08-03 PROCEDURE — 85014 HEMATOCRIT: CPT

## 2021-08-03 PROCEDURE — 82565 ASSAY OF CREATININE: CPT

## 2021-08-03 PROCEDURE — 87640 STAPH A DNA AMP PROBE: CPT

## 2021-08-03 PROCEDURE — P9011: CPT

## 2021-08-03 PROCEDURE — 85730 THROMBOPLASTIN TIME PARTIAL: CPT

## 2021-08-03 PROCEDURE — 80053 COMPREHEN METABOLIC PANEL: CPT

## 2021-08-03 PROCEDURE — 86901 BLOOD TYPING SEROLOGIC RH(D): CPT

## 2021-08-03 PROCEDURE — 82330 ASSAY OF CALCIUM: CPT

## 2021-08-03 PROCEDURE — 72050 X-RAY EXAM NECK SPINE 4/5VWS: CPT

## 2021-08-03 RX ORDER — SENNA PLUS 8.6 MG/1
2 TABLET ORAL
Qty: 0 | Refills: 0 | DISCHARGE
Start: 2021-08-03

## 2021-08-03 RX ORDER — LACTULOSE 10 G/15ML
15 SOLUTION ORAL
Qty: 0 | Refills: 0 | DISCHARGE
Start: 2021-08-03

## 2021-08-03 RX ORDER — PROPRANOLOL HCL 160 MG
1 CAPSULE, EXTENDED RELEASE 24HR ORAL
Qty: 0 | Refills: 0 | DISCHARGE

## 2021-08-03 RX ORDER — THIAMINE MONONITRATE (VIT B1) 100 MG
1 TABLET ORAL
Qty: 0 | Refills: 0 | DISCHARGE
Start: 2021-08-03

## 2021-08-03 RX ORDER — LACTULOSE 10 G/15ML
30 SOLUTION ORAL
Qty: 0 | Refills: 0 | DISCHARGE
Start: 2021-08-03

## 2021-08-03 RX ORDER — LACTULOSE 10 G/15ML
10 SOLUTION ORAL
Qty: 0 | Refills: 0 | DISCHARGE

## 2021-08-03 RX ORDER — PROPRANOLOL HCL 160 MG
1 CAPSULE, EXTENDED RELEASE 24HR ORAL
Qty: 0 | Refills: 0 | DISCHARGE
Start: 2021-08-03

## 2021-08-03 RX ORDER — PANTOPRAZOLE SODIUM 20 MG/1
1 TABLET, DELAYED RELEASE ORAL
Qty: 0 | Refills: 0 | DISCHARGE
Start: 2021-08-03

## 2021-08-03 RX ORDER — PREGABALIN 225 MG/1
1 CAPSULE ORAL
Qty: 0 | Refills: 0 | DISCHARGE
Start: 2021-08-03

## 2021-08-03 RX ORDER — ENOXAPARIN SODIUM 100 MG/ML
40 INJECTION SUBCUTANEOUS
Qty: 0 | Refills: 0 | DISCHARGE
Start: 2021-08-03

## 2021-08-03 RX ORDER — FUROSEMIDE 40 MG
1 TABLET ORAL
Qty: 0 | Refills: 0 | DISCHARGE

## 2021-08-03 RX ORDER — POLYETHYLENE GLYCOL 3350 17 G/17G
17 POWDER, FOR SOLUTION ORAL
Qty: 0 | Refills: 0 | DISCHARGE
Start: 2021-08-03

## 2021-08-03 RX ORDER — FUROSEMIDE 40 MG
1 TABLET ORAL
Qty: 0 | Refills: 0 | DISCHARGE
Start: 2021-08-03

## 2021-08-03 RX ORDER — METHOCARBAMOL 500 MG/1
1 TABLET, FILM COATED ORAL
Qty: 0 | Refills: 0 | DISCHARGE
Start: 2021-08-03

## 2021-08-03 RX ORDER — PANTOPRAZOLE SODIUM 20 MG/1
1 TABLET, DELAYED RELEASE ORAL
Qty: 0 | Refills: 0 | DISCHARGE

## 2021-08-03 RX ADMIN — PANTOPRAZOLE SODIUM 40 MILLIGRAM(S): 20 TABLET, DELAYED RELEASE ORAL at 05:03

## 2021-08-03 RX ADMIN — LACTULOSE 20 GRAM(S): 10 SOLUTION ORAL at 12:36

## 2021-08-03 RX ADMIN — ENOXAPARIN SODIUM 40 MILLIGRAM(S): 100 INJECTION SUBCUTANEOUS at 12:37

## 2021-08-03 RX ADMIN — METHOCARBAMOL 750 MILLIGRAM(S): 500 TABLET, FILM COATED ORAL at 05:03

## 2021-08-03 RX ADMIN — Medication 20 MILLIGRAM(S): at 05:03

## 2021-08-03 RX ADMIN — PREGABALIN 1000 MICROGRAM(S): 225 CAPSULE ORAL at 12:36

## 2021-08-03 RX ADMIN — Medication 100 MILLIGRAM(S): at 12:36

## 2021-08-03 RX ADMIN — LACTULOSE 20 GRAM(S): 10 SOLUTION ORAL at 05:03

## 2021-08-03 RX ADMIN — POLYETHYLENE GLYCOL 3350 17 GRAM(S): 17 POWDER, FOR SOLUTION ORAL at 05:03

## 2021-08-03 NOTE — DISCHARGE NOTE PROVIDER - NSDCMRMEDTOKEN_GEN_ALL_CORE_FT
Calcium 600+D oral tablet: 1 tab(s) orally once a day  furosemide 20 mg oral tablet: 1 tab(s) orally once a day  lactulose: 10 milliliter(s) orally 3 times a day  [place] Cervical Collar: S/P C2-4 post cervical fusion  Multiple Vitamins oral capsule: 1 cap(s) orally once a day, last dose 6/15/21  pantoprazole 40 mg oral delayed release tablet: 1 tab(s) orally once a day, am  propranolol 10 mg oral tablet: 1 tab(s) orally once a day   Calcium 600+D oral tablet: 1 tab(s) orally once a day  cyanocobalamin 1000 mcg oral tablet: 1 tab(s) orally once a day  enoxaparin: 40 milligram(s) subcutaneous once a day (at bedtime) stop after 7 days.   furosemide 20 mg oral tablet: 1 tab(s) orally once a day  lactulose 10 g/15 mL oral syrup: 30 milliliter(s) orally every 6 hours, titrate to 2-3 BMs daily  methocarbamol 750 mg oral tablet: 1 tab(s) orally every 12 hours  Multiple Vitamins oral capsule: 1 cap(s) orally once a day, last dose 6/15/21  pantoprazole 40 mg oral delayed release tablet: 1 tab(s) orally once a day (before a meal)  polyethylene glycol 3350 oral powder for reconstitution: 17 gram(s) orally 2 times a day  propranolol 10 mg oral tablet: 1 tab(s) orally once a day  senna oral tablet: 2 tab(s) orally once a day (at bedtime)  thiamine 100 mg oral tablet: 1 tab(s) orally once a day

## 2021-08-03 NOTE — DISCHARGE NOTE PROVIDER - HOSPITAL COURSE
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 After the episode pt was able to ambulate using a cane and eventually regained normal strength. In october pt started experiencing tingling in the toes which eventually caused him to fall leading to Right sided hip fracture which was surgically repaired. Pt continued rehab after surgery and was able to ambulate using walker. In february 2021 pt received the 2nd dose of Pfizer vaccine and the tingling sensation in his toes started to get worse.  For the last 6 weeks pt has been bed bound as he has little to no strength in his lower extremities. Pt went to the clinic of Dr. Clarke where he was examined and told to come to the ED. He was brought in by his daughter and wife who were contacted to get most of the history as pt wasn't too clear on the timeline.    (25 Jul 2021 21:03)    Patient admitted and MRIs revealed severe cervical stenosis and mild C3/4 stenosis. Given history of liver cirrhosis his coags were elevated and he received FFP prior to OR. 7/26/21 s/p C2-4 posterior decompression and fusion for cervical stenosis and myelopathy. Post procedure he did well. PT/OT/PMR evaluated him and recommended subacute rehab. He was followed in consult by hospitalist medicine. On the day of discharge he was medically and neurologically stable for discharge to rehab.      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 After the episode pt was able to ambulate using a cane and eventually regained normal strength. In october pt started experiencing tingling in the toes which eventually caused him to fall leading to Right sided hip fracture which was surgically repaired. Pt continued rehab after surgery and was able to ambulate using walker. In february 2021 pt received the 2nd dose of Pfizer vaccine and the tingling sensation in his toes started to get worse.  For the last 6 weeks pt has been bed bound as he has little to no strength in his lower extremities. Pt went to the clinic of Dr. Clarke where he was examined and told to come to the ED. He was brought in by his daughter and wife who were contacted to get most of the history as pt wasn't too clear on the timeline.    (25 Jul 2021 21:03)    Patient admitted and MRIs revealed severe cervical stenosis and mild C3/4 stenosis. Given history of liver cirrhosis his coags were elevated and he received FFP prior to OR. 7/26/21 s/p C2-4 posterior decompression and fusion for cervical stenosis and myelopathy. He had routine post operative care. Post procedure he did well. PT/OT/PMR evaluated him and recommended subacute rehab. He was followed in consult by hospitalist medicine. On the day of discharge he was medically and neurologically stable for discharge to rehab.

## 2021-08-03 NOTE — PROGRESS NOTE ADULT - PROVIDER SPECIALTY LIST ADULT
Hospitalist
Neurosurgery
Hospitalist
NSICU
Neurosurgery
Hospitalist
Neurosurgery
Neurosurgery
Orthopedics
Hospitalist
Neurosurgery
NSICU
Neurosurgery
Hospitalist

## 2021-08-03 NOTE — DISCHARGE NOTE PROVIDER - NSDCFUSCHEDAPPT_GEN_ALL_CORE_FT
KETAN TIPTON ; 08/18/2021 ; NPP Neuro [address] [facility]  KETAN TIPTON ; 10/19/2021 ; NPP Med Gastro [address]

## 2021-08-03 NOTE — PROGRESS NOTE ADULT - NSICDXPILOT_GEN_ALL_CORE
[place]

## 2021-08-03 NOTE — DISCHARGE NOTE PROVIDER - REASON FOR ADMISSION
7/26/21 s/p C2-4 posterior decompression and fusion for cervical stenosis and myelopathy.  7/26/21 s/p C2-4 posterior decompression and fusion for cervical stenosis and myelopathy.

## 2021-08-03 NOTE — PROGRESS NOTE ADULT - ASSESSMENT
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 Post op course uneventful    Plan    Neuro stable. Tapered steroids off . C collar at all times Aquacel AG dressing changed 8/2   Pain control-   pt encouraged to take pain meds. On Robaxin ATC & continue Oxy IR prn  Liver cirrhosis - Lactulose daily. On Lasix. propranolol for portal hypertension . . Continue PPI. Medicine follow up appreciated   DVT ppx   ROBINSON upon d/c    will d/c IVL and d/c to rehab today    will discuss [facility] Dr Knott   [id number]

## 2021-08-03 NOTE — DISCHARGE NOTE NURSING/CASE MANAGEMENT/SOCIAL WORK - PATIENT PORTAL LINK FT
You can access the FollowMyHealth Patient Portal offered by [facility] by registering at the following website: [email or web address]. By joining [facility]’s FollowMyHealth portal, you will also be able to view your health information using other applications (apps) compatible with our system.

## 2021-08-03 NOTE — DISCHARGE NOTE PROVIDER - CARE PROVIDER_API CALL
Kushal Knott (MD; JAMILAH; MS)  Neurosurgery  [address], [address]  [place] [address]  Phone: [phone]  Fax: [phone]  Follow Up Time:

## 2021-08-03 NOTE — PROGRESS NOTE ADULT - SUBJECTIVE AND OBJECTIVE BOX
SUBJECTIVE: Patient seen and examined, having breakfast in chair, +collar in place.     Vital Signs Last 24 Hrs  T(C): 36.9 (03 Aug 2021 08:29), Max: 37 (02 Aug 2021 15:25)  T(F): 98.4 (03 Aug 2021 08:29), Max: 98.6 (02 Aug 2021 15:25)  HR: 66 (03 Aug 2021 08:29) (63 - 93)  BP: 137/64 (03 Aug 2021 08:29) (112/76 - 146/70)  BP(mean): 71 (02 Aug 2021 11:59) (71 - 71)  RR: 18 (03 Aug 2021 08:29) (18 - 18)  SpO2: 100% (03 Aug 2021 08:29) (95% - 100%)    PHYSICAL EXAM:      Neurological: Awake alert Ox3, Speech clear Following Commands, Moving all Extremities . RUE 4/5 LLE 4+/5 . b/l HF 3/5 KF 4/5 KE 4/5 DF/PF 4/5 . Posterior cervical incison C/D/I     Pulmonary: Clear to Auscultation,     Cardiovascular: S1, S2, Regular rate and rhythm     Gastrointestinal: Soft, Non-tender, Non-distended     Extremities: No calf tenderness       LABS:                        11.0   9.74  )-----------( 173      ( 02 Aug 2021 06:18 )             34.6    08-02    134<L>  |  99  |  34<H>  ----------------------------<  99  4.4   |  26  |  1.01    Ca    8.8      02 Aug 2021 06:18        IMAGING:         MEDICATIONS:    methocarbamol 750 milliGRAM(s) Oral every 12 hours  oxyCODONE    IR 5 milliGRAM(s) Oral every 4 hours PRN Moderate Pain (4 - 6)  oxyCODONE    IR 10 milliGRAM(s) Oral every 4 hours PRN Severe Pain (7 - 10)  furosemide    Tablet 20 milliGRAM(s) Oral daily  propranolol 10 milliGRAM(s) Oral daily  lactulose Syrup 20 Gram(s) Oral every 6 hours  pantoprazole    Tablet 40 milliGRAM(s) Oral before breakfast  polyethylene glycol 3350 17 Gram(s) Oral two times a day  senna 2 Tablet(s) Oral at bedtime  cyanocobalamin 1000 MICROGram(s) Oral daily  enoxaparin Injectable 40 milliGRAM(s) SubCutaneous daily  glucagon  Injectable 1 milliGRAM(s) IntraMuscular once  thiamine 100 milliGRAM(s) Oral daily      DIET:

## 2021-08-03 NOTE — DISCHARGE NOTE PROVIDER - NSDCCPCAREPLAN_GEN_ALL_CORE_FT
PRINCIPAL DISCHARGE DIAGNOSIS  Diagnosis: Spinal stenosis in cervical region  Assessment and Plan of Treatment: Spinal stenosis in cervical region      SECONDARY DISCHARGE DIAGNOSES  Diagnosis: Liver cirrhosis  Assessment and Plan of Treatment: Liver cirrhosis     PRINCIPAL DISCHARGE DIAGNOSIS  Diagnosis: Spinal stenosis in cervical region  Assessment and Plan of Treatment: 7/26/21 s/p C2-4 posterior decompression and fusion for cervical stenosis and myelopathy.  Dr Knott- neurosurgeon- please call office for appointment after discharge from rehab. Wear collar at all times.   Primary care physicain upon discharge from rehab.      SECONDARY DISCHARGE DIAGNOSES  Diagnosis: Liver cirrhosis  Assessment and Plan of Treatment: Please follow up with your primary care physician upon discharge from rehab. Please continue medications.

## 2021-08-03 NOTE — DISCHARGE NOTE NURSING/CASE MANAGEMENT/SOCIAL WORK - BRAND OF COVID-19 VACCINATION
Pt does not remember what vaccine or date of last vaccination but believes it was in May. RN attempted to call wife for information - did not answer./Pfizer dose 1 and 2

## 2021-08-03 NOTE — DISCHARGE NOTE PROVIDER - NSDCFUADDAPPT_GEN_ALL_CORE_FT
please wear collar at all times  staple removal for 8/9/21  may remove aquacel AG dressing on 8/8 and leave open to air.

## 2021-08-18 ENCOUNTER — APPOINTMENT (OUTPATIENT)
Dept: NEUROLOGY | Facility: CLINIC | Age: 81
End: 2021-08-18

## 2021-09-01 ENCOUNTER — APPOINTMENT (OUTPATIENT)
Dept: NEUROLOGY | Facility: CLINIC | Age: 81
End: 2021-09-01

## 2021-09-07 NOTE — H&P ADULT - PROBLEM SELECTOR PLAN 6
No Per prior admission, patient is FULL CODE. Patient with arthritis with mild chronic joint pain   - Will give Tylenol PRN, max 2000 mg/day given cirrhosis Patient with arthritis with mild chronic joint pain   - Will give Tylenol PRN q8h, max 2000 mg/day given cirrhosis Patient not on any home anti-hypertensives other than propranolol   - Will continue to monitor, BP 140s/80s Essential hypertension  Patient not on any home anti-hypertensives other than propranolol   - Will continue to monitor, BP 140s/80s

## 2021-10-04 ENCOUNTER — NON-APPOINTMENT (OUTPATIENT)
Age: 81
End: 2021-10-04

## 2021-10-19 ENCOUNTER — APPOINTMENT (OUTPATIENT)
Dept: GASTROENTEROLOGY | Facility: HOSPITAL | Age: 81
End: 2021-10-19

## 2021-11-09 ENCOUNTER — APPOINTMENT (OUTPATIENT)
Dept: NEUROLOGY | Facility: CLINIC | Age: 81
End: 2021-11-09
Payer: MEDICARE

## 2021-11-09 VITALS
TEMPERATURE: 98.7 F | BODY MASS INDEX: 28.32 KG/M2 | DIASTOLIC BLOOD PRESSURE: 74 MMHG | WEIGHT: 170 LBS | HEIGHT: 65 IN | HEART RATE: 81 BPM | SYSTOLIC BLOOD PRESSURE: 108 MMHG

## 2021-11-09 DIAGNOSIS — M25.512 PAIN IN LEFT SHOULDER: ICD-10-CM

## 2021-11-09 PROCEDURE — 95913 NRV CNDJ TEST 13/> STUDIES: CPT

## 2021-11-09 PROCEDURE — 95886 MUSC TEST DONE W/N TEST COMP: CPT | Mod: 50

## 2021-11-09 RX ORDER — LIDOCAINE HCL 4 %
4 LIQUID ROLL-ON (ML) TOPICAL
Refills: 0 | Status: ACTIVE | COMMUNITY

## 2021-11-09 RX ORDER — METHOCARBAMOL 750 MG/1
750 TABLET, FILM COATED ORAL
Refills: 0 | Status: ACTIVE | COMMUNITY

## 2021-11-09 NOTE — PROCEDURE
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

## 2021-12-08 NOTE — CHART NOTE - NSCHARTNOTESELECT_GEN_ALL_CORE
Event Note Initiate Treatment: Avar cleanser nightly \\nGentle cleanser every morning Detail Level: Zone Render In Strict Bullet Format?: No

## 2021-12-15 NOTE — ED PROVIDER NOTE - NS ED MD DISPO ADMITTING SERVICE
Patient calls requesting refill of lisinopril.  Medication is not active on med list.  Most recent office visit note lists lisinopril 40  Mg.    Please advise.  Pharmacy-[facility].     Disp Refills Start End    lisinopril (PRINIVIL,ZESTRIL) 40 MG tablet (Discontinued) 90 tablet 1 2/24/2020 2/24/2020    Sig: TAKE 1 TABLET BY MOUTH ONE TIME A DAY     Sent to pharmacy as: Lisinopril 40 MG Oral Tablet    Class: Eprescribe    Reason for Discontinue: Not Needed         MED

## 2021-12-16 NOTE — PATIENT PROFILE ADULT. - FUNCTIONAL SCREEN CURRENT LEVEL: TRANSFERRING, MLM
Ana Rosa is a 4 year old female presenting for vomiting, runny nose, fever, and cough that has been on and off since 10/14/21 when pt was seen in Urgent Care in . Pt received Tylenol this morning around 0530 & ibuprofen at around 1000 - pt spit up after so mom is not sure how much was given. Pt had a fever from 101-103 last night. Sleep schedule and appetite have decreased a little since symptoms started this week.      Concerns: ^^      Denies known Latex allergy or symptoms of Latex sensitivity.  Medications reviewed and updated.  Social History     Tobacco Use   Smoking Status Not on file   Smokeless Tobacco Not on file   Tobacco Comment    no smokers in the home     Health Maintenance Due   Topic Date Due   • Annual Physical (ages 3-18)  01/05/2022          (2) assistive person

## 2022-01-02 ENCOUNTER — INPATIENT (INPATIENT)
Facility: HOSPITAL | Age: 82
LOS: 4 days | Discharge: HOME | DRG: 378 | End: 2022-01-07
Attending: HOSPITALIST | Admitting: HOSPITALIST
Payer: COMMERCIAL

## 2022-01-02 VITALS
HEIGHT: 60.8 IN | WEIGHT: 167.99 LBS | RESPIRATION RATE: 18 BRPM | HEART RATE: 84 BPM | OXYGEN SATURATION: 98 % | TEMPERATURE: 98 F

## 2022-01-02 DIAGNOSIS — K92.1 MELENA: ICD-10-CM

## 2022-01-02 DIAGNOSIS — K74.60 UNSPECIFIED CIRRHOSIS OF LIVER: ICD-10-CM

## 2022-01-02 DIAGNOSIS — K80.20 CALCULUS OF GALLBLADDER WITHOUT CHOLECYSTITIS WITHOUT OBSTRUCTION: ICD-10-CM

## 2022-01-02 DIAGNOSIS — I10 ESSENTIAL (PRIMARY) HYPERTENSION: ICD-10-CM

## 2022-01-02 DIAGNOSIS — Z98.890 OTHER SPECIFIED POSTPROCEDURAL STATES: Chronic | ICD-10-CM

## 2022-01-02 DIAGNOSIS — Z96.649 PRESENCE OF UNSPECIFIED ARTIFICIAL HIP JOINT: Chronic | ICD-10-CM

## 2022-01-02 DIAGNOSIS — N18.9 CHRONIC KIDNEY DISEASE, UNSPECIFIED: ICD-10-CM

## 2022-01-02 DIAGNOSIS — R74.01 ELEVATION OF LEVELS OF LIVER TRANSAMINASE LEVELS: ICD-10-CM

## 2022-01-02 DIAGNOSIS — Z29.9 ENCOUNTER FOR PROPHYLACTIC MEASURES, UNSPECIFIED: ICD-10-CM

## 2022-01-02 DIAGNOSIS — D64.9 ANEMIA, UNSPECIFIED: ICD-10-CM

## 2022-01-02 LAB
ALBUMIN SERPL ELPH-MCNC: 2.2 G/DL — LOW (ref 3.5–5)
ALP SERPL-CCNC: 281 U/L — HIGH (ref 40–120)
ALT FLD-CCNC: 23 U/L DA — SIGNIFICANT CHANGE UP (ref 10–60)
ANION GAP SERPL CALC-SCNC: 8 MMOL/L — SIGNIFICANT CHANGE UP (ref 5–17)
ANISOCYTOSIS BLD QL: SLIGHT — SIGNIFICANT CHANGE UP
AST SERPL-CCNC: 83 U/L — HIGH (ref 10–40)
BASOPHILS # BLD AUTO: 0.03 K/UL — SIGNIFICANT CHANGE UP (ref 0–0.2)
BASOPHILS NFR BLD AUTO: 0.4 % — SIGNIFICANT CHANGE UP (ref 0–2)
BILIRUB SERPL-MCNC: 1.2 MG/DL — SIGNIFICANT CHANGE UP (ref 0.2–1.2)
BLD GP AB SCN SERPL QL: SIGNIFICANT CHANGE UP
BUN SERPL-MCNC: 21 MG/DL — HIGH (ref 7–18)
CALCIUM SERPL-MCNC: 9 MG/DL — SIGNIFICANT CHANGE UP (ref 8.4–10.5)
CHLORIDE SERPL-SCNC: 101 MMOL/L — SIGNIFICANT CHANGE UP (ref 96–108)
CO2 SERPL-SCNC: 26 MMOL/L — SIGNIFICANT CHANGE UP (ref 22–31)
CREAT SERPL-MCNC: 1.6 MG/DL — HIGH (ref 0.5–1.3)
EOSINOPHIL # BLD AUTO: 0.1 K/UL — SIGNIFICANT CHANGE UP (ref 0–0.5)
EOSINOPHIL NFR BLD AUTO: 1.4 % — SIGNIFICANT CHANGE UP (ref 0–6)
GLUCOSE SERPL-MCNC: 92 MG/DL — SIGNIFICANT CHANGE UP (ref 70–99)
HCT VFR BLD CALC: 43.5 % — SIGNIFICANT CHANGE UP (ref 39–50)
HGB BLD-MCNC: 14.1 G/DL — SIGNIFICANT CHANGE UP (ref 13–17)
IMM GRANULOCYTES NFR BLD AUTO: 0.8 % — SIGNIFICANT CHANGE UP (ref 0–1.5)
LYMPHOCYTES # BLD AUTO: 1.19 K/UL — SIGNIFICANT CHANGE UP (ref 1–3.3)
LYMPHOCYTES # BLD AUTO: 16.3 % — SIGNIFICANT CHANGE UP (ref 13–44)
MACROCYTES BLD QL: SLIGHT — SIGNIFICANT CHANGE UP
MAGNESIUM SERPL-MCNC: 1.7 MG/DL — SIGNIFICANT CHANGE UP (ref 1.6–2.6)
MANUAL SMEAR VERIFICATION: SIGNIFICANT CHANGE UP
MCHC RBC-ENTMCNC: 25 PG — LOW (ref 27–34)
MCHC RBC-ENTMCNC: 32.4 GM/DL — SIGNIFICANT CHANGE UP (ref 32–36)
MCV RBC AUTO: 77 FL — LOW (ref 80–100)
MONOCYTES # BLD AUTO: 0.52 K/UL — SIGNIFICANT CHANGE UP (ref 0–0.9)
MONOCYTES NFR BLD AUTO: 7.1 % — SIGNIFICANT CHANGE UP (ref 2–14)
NEUTROPHILS # BLD AUTO: 5.4 K/UL — SIGNIFICANT CHANGE UP (ref 1.8–7.4)
NEUTROPHILS NFR BLD AUTO: 74 % — SIGNIFICANT CHANGE UP (ref 43–77)
NRBC # BLD: 0 /100 WBCS — SIGNIFICANT CHANGE UP (ref 0–0)
OB PNL STL: POSITIVE
PLAT MORPH BLD: NORMAL — SIGNIFICANT CHANGE UP
PLATELET # BLD AUTO: 209 K/UL — SIGNIFICANT CHANGE UP (ref 150–400)
PLATELET COUNT - ESTIMATE: NORMAL — SIGNIFICANT CHANGE UP
POIKILOCYTOSIS BLD QL AUTO: SLIGHT — SIGNIFICANT CHANGE UP
POTASSIUM SERPL-MCNC: 5.6 MMOL/L — HIGH (ref 3.5–5.3)
POTASSIUM SERPL-SCNC: 5.6 MMOL/L — HIGH (ref 3.5–5.3)
PROT SERPL-MCNC: 7.1 G/DL — SIGNIFICANT CHANGE UP (ref 6–8.3)
RBC # BLD: 5.65 M/UL — SIGNIFICANT CHANGE UP (ref 4.2–5.8)
RBC # FLD: 20.9 % — HIGH (ref 10.3–14.5)
RBC BLD AUTO: ABNORMAL
SARS-COV-2 RNA SPEC QL NAA+PROBE: SIGNIFICANT CHANGE UP
SODIUM SERPL-SCNC: 135 MMOL/L — SIGNIFICANT CHANGE UP (ref 135–145)
WBC # BLD: 7.3 K/UL — SIGNIFICANT CHANGE UP (ref 3.8–10.5)
WBC # FLD AUTO: 7.3 K/UL — SIGNIFICANT CHANGE UP (ref 3.8–10.5)

## 2022-01-02 PROCEDURE — 99285 EMERGENCY DEPT VISIT HI MDM: CPT

## 2022-01-02 PROCEDURE — 99223 1ST HOSP IP/OBS HIGH 75: CPT

## 2022-01-02 PROCEDURE — 71045 X-RAY EXAM CHEST 1 VIEW: CPT | Mod: 26

## 2022-01-02 RX ORDER — PANTOPRAZOLE SODIUM 20 MG/1
40 TABLET, DELAYED RELEASE ORAL ONCE
Refills: 0 | Status: COMPLETED | OUTPATIENT
Start: 2022-01-02 | End: 2022-01-02

## 2022-01-02 RX ORDER — FUROSEMIDE 40 MG
20 TABLET ORAL DAILY
Refills: 0 | Status: DISCONTINUED | OUTPATIENT
Start: 2022-01-02 | End: 2022-01-07

## 2022-01-02 RX ORDER — PANTOPRAZOLE SODIUM 20 MG/1
40 TABLET, DELAYED RELEASE ORAL
Refills: 0 | Status: DISCONTINUED | OUTPATIENT
Start: 2022-01-02 | End: 2022-01-03

## 2022-01-02 RX ORDER — PREGABALIN 225 MG/1
1000 CAPSULE ORAL DAILY
Refills: 0 | Status: DISCONTINUED | OUTPATIENT
Start: 2022-01-02 | End: 2022-01-07

## 2022-01-02 RX ORDER — THIAMINE MONONITRATE (VIT B1) 100 MG
100 TABLET ORAL DAILY
Refills: 0 | Status: DISCONTINUED | OUTPATIENT
Start: 2022-01-02 | End: 2022-01-07

## 2022-01-02 RX ORDER — LACTULOSE 10 G/15ML
15 SOLUTION ORAL
Refills: 0 | Status: DISCONTINUED | OUTPATIENT
Start: 2022-01-02 | End: 2022-01-07

## 2022-01-02 RX ADMIN — PANTOPRAZOLE SODIUM 40 MILLIGRAM(S): 20 TABLET, DELAYED RELEASE ORAL at 22:32

## 2022-01-02 NOTE — H&P ADULT - ASSESSMENT
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

## 2022-01-02 NOTE — ED ADULT NURSE NOTE - NSICDXPASTMEDICALHX_GEN_ALL_CORE_FT
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

## 2022-01-02 NOTE — ED PROVIDER NOTE - NSICDXPASTMEDICALHX_GEN_ALL_CORE_FT
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

## 2022-01-02 NOTE — ED PROVIDER NOTE - OBJECTIVE STATEMENT
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 No abdominal pain, fevers, chills. No other symptoms. History obtained from wife ([phone]), pt is poor historian.

## 2022-01-02 NOTE — ED ADULT NURSE NOTE - OBJECTIVE STATEMENT
pt present to ED accompanied by wife c/o of black stool x1 episode yesterday, denies any nausea/vomiting at this time

## 2022-01-02 NOTE — H&P ADULT - HISTORY OF PRESENT ILLNESS
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 Pt with nausea and generalized abdominal pain 1/1 which was relieved by defecating. Pt reports constipation. Pt is a poor historian. History is taken from wife -[phone].

## 2022-01-02 NOTE — H&P ADULT - CONVERSATION DETAILS
Spoke with wife regarding MOLST and GOC. Pt wife states that the patient has never expressed his wishes but as his next of kin/hcp, she would like for him to be full code for now. She states that she likely will not want him on long term life support but would like to keep him alive if he is able to recover.

## 2022-01-02 NOTE — H&P ADULT - NSICDXPASTMEDICALHX_GEN_ALL_CORE_FT
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

## 2022-01-02 NOTE — H&P ADULT - PROBLEM SELECTOR PLAN 2
had plastic stent placed in 7/2021, needs replacement done by Dr. Neftaly Mckeon    AST/ALT 83/23  Bili wnl  known gall bladder disease  f/u ct ap non con  GI Dr. Licea consulted

## 2022-01-02 NOTE — H&P ADULT - NSHPPHYSICALEXAM_GEN_ALL_CORE
General - NAD, sitting up in bed, well groomed  Eyes - PERRLA, EOM intact  ENT - Nonicteric sclerae, PERRLA, EOMI. Oropharynx clear. Moist mucous membranes. Conjunctivae appear well perfused.   Neck - No noticeable or palpable swelling, redness or rash around throat or on face  Lymph Nodes - No lymphadenopathy  Cardiovascular - RRR no m/r/g, no JVD, no carotid bruits  Lungs - Clear to ascultation, no use of accessory muscles, no crackles or wheezes.  Skin - No rashes, skin warm and dry, no erythematous areas  Abdomen - Normal bowel sounds, abdomen soft, (+) mild tenderness to palpation, generalized  Rectal – Rectal exam not performed since no symptoms indicated blood loss.  Extremities - No edema, cyanosis or clubbing  Musculoskeletal - 5/5 strength, normal range of motion, no swollen or erythematous joints.  Neuro– Alert and oriented x 3, CN 2-12 grossly intact.

## 2022-01-02 NOTE — H&P ADULT - ATTENDING COMMENTS
Patient is an 81 year old male with a PMH of HTN, Alcoholic Liver Cirrhosis with h/o Variceal Bleeding s/p banding 2018, Dementia, Chronic Renal Insufficiency, Porcelain Gallbladder, Pancreatitis 2/2 Choledocholithiasis s/p ERCP with stone extraction and plastic stent placement (11/2019, stent now removed), spinal stenosis (bedbound from this) who was BIBEMS due to bleeding.        Endoscopic retrograde cholangiopancreatography with stent exchange, cholangioscopy with EHL, and stone removal on 6/22/2021      P/E: As above MAR    A/P:    GI Bleeding:  -FOBT= Positive  -Type and Screen obtained in ED  -Should send Iron Studies (Iron, TIBC, Ferritin) as add-ons to already resulted CBC  -Given history of varices, will consider initiation of Octreotide and will reinitiate non-selective beta-blocker  -PPI BID  -Will need to ask GI to evaluate patient for further recommendations    Microcytic Anemia:  -As above    Hyperkalemia:  -Will order Bicarb, Calcium, D50, Insulin  -Will continue to closely monitor    Chronic Renal Insufficiency:  -Cr= 1.60 (Baseline= 1.01 on 8/2/2021)  -Can consider sending Urinary Electrolytes (Sodium, Potassium, Creatinine, Chloride)  -Will continue with IVF hydration  -If no demonstrated improvement, can consider obtaining Bilateral Renal Sonogram, though will hold for now      Hypoalbuminemia:  -Nutrition Consult    Elevated Alk Phos and Transaminitis:  -    -Non-bleeding rectal varices were seen.       -There was a vascular ectasia in the descending colon. APC was applied        ablating it.       -There was a vascular ectasia in the ascending colon. APC was applied        ablating it.         Distended gallbladder and cholelithiasis with irregular wall thickening. Patient is an 81 year old male with a PMH of HTN, Alcoholic Liver Cirrhosis with h/o Variceal Bleeding s/p banding 2018, Dementia, Chronic Renal Insufficiency, Porcelain Gallbladder, Pancreatitis 2/2 Choledocholithiasis s/p ERCP with stone extraction and plastic stent placement (11/2019, stent now removed), spinal stenosis (bedbound from this) who presented with a chief complaint of rectal bleeding.  Patient is awake, alert and conversant though a poor historian.  Therefore a significant amount of information was obtained from medical records.  As per ED Attending note "· HPI Objective Statement: 81-year-old male hx of alcoholic/WALL cirrhosis c/b variceal bleed s/p banding 2018 , dementia, HTN, CKD3, porcelain gallbladder (not a surgical candidate), pancreatitis 2/2 choledocholithiasis s/p ERCP with stone extraction and plastic stent placement (11/2019, stent now removed), spinal stenosis (bedbound from this) p/w productive cough, vomiting 1x, black tarry stool, dizziness last night. No abdominal pain, fevers, chills. No other symptoms. History obtained from wife ([phone]), pt is poor historian."    T(C): 36.7 (01-02-22 @ 21:21), Max: 36.9 (01-02-22 @ 16:35)  T(F): 98 (01-02-22 @ 21:21), Max: 98.5 (01-02-22 @ 16:35)  HR: 70 (01-02-22 @ 21:21) (70 - 84)  BP: 120/77 (01-02-22 @ 21:21) (120/77 - 120/77)  RR: 18 (01-02-22 @ 21:21) (18 - 18)  SpO2: 95% (01-02-22 @ 21:21) (95% - 98%)  Wt(kg): --    P/E: As above MAR    A/P:    GI Bleeding:  -FOBT= Positive  -Type and Screen obtained in ED  -Should send Iron Studies (Iron, TIBC, Ferritin) as add-ons to already resulted CBC  -Given history of varices, will consider initiation of Octreotide and will reinitiate non-selective beta-blocker  -PPI BID  -Will need to ask GI to evaluate patient for further recommendations    Microcytic Anemia:  -As above    Hyperkalemia:  -Will order Bicarb, Calcium, D50, Insulin  -Will continue to closely monitor    Chronic Renal Insufficiency:  -Cr= 1.60 (Baseline= 1.01 on 8/2/2021)  -Can consider sending Urinary Electrolytes (Sodium, Potassium, Creatinine, Chloride)  -Will continue with IVF hydration  -If no demonstrated improvement, can consider obtaining Bilateral Renal Sonogram, though will hold for now    Hypoalbuminemia:  -Nutrition Consult    GI/DVT PPx:  -Intermittent Compression Stockings  -PPI BID

## 2022-01-02 NOTE — H&P ADULT - PROBLEM SELECTOR PLAN 1
p/w black tarry stools  known history of GI bleed  last colonoscopy 4/2020 -showing rectal varices, vascular ectasia in ascending in descending colon s/p ablation  protonix 40 po bid  Hb 14, baseline hb ~10-11  Monitor CBC q8  NPO for now  GI - Dr. Licea consulted

## 2022-01-02 NOTE — ED PROVIDER NOTE - CLINICAL SUMMARY MEDICAL DECISION MAKING FREE TEXT BOX
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 Will check labs, FOBT, dispo pending results.

## 2022-01-02 NOTE — H&P ADULT - NSICDXPASTSURGICALHX_GEN_ALL_CORE_FT
PAST SURGICAL HISTORY:  H/O inguinal hernia repair     History of hip replacement 10/2020    History of repair of hip fracture R hip

## 2022-01-03 DIAGNOSIS — Z71.89 OTHER SPECIFIED COUNSELING: ICD-10-CM

## 2022-01-03 DIAGNOSIS — N17.9 ACUTE KIDNEY FAILURE, UNSPECIFIED: ICD-10-CM

## 2022-01-03 LAB
A1C WITH ESTIMATED AVERAGE GLUCOSE RESULT: 4.9 % — SIGNIFICANT CHANGE UP (ref 4–5.6)
ALBUMIN SERPL ELPH-MCNC: 2.2 G/DL — LOW (ref 3.5–5)
ALP SERPL-CCNC: 223 U/L — HIGH (ref 40–120)
ALT FLD-CCNC: 19 U/L DA — SIGNIFICANT CHANGE UP (ref 10–60)
ANION GAP SERPL CALC-SCNC: 5 MMOL/L — SIGNIFICANT CHANGE UP (ref 5–17)
ANION GAP SERPL CALC-SCNC: 8 MMOL/L — SIGNIFICANT CHANGE UP (ref 5–17)
APTT BLD: 30.4 SEC — SIGNIFICANT CHANGE UP (ref 27.5–35.5)
AST SERPL-CCNC: 54 U/L — HIGH (ref 10–40)
BASOPHILS # BLD AUTO: 0.02 K/UL — SIGNIFICANT CHANGE UP (ref 0–0.2)
BASOPHILS NFR BLD AUTO: 0.3 % — SIGNIFICANT CHANGE UP (ref 0–2)
BILIRUB SERPL-MCNC: 1.1 MG/DL — SIGNIFICANT CHANGE UP (ref 0.2–1.2)
BUN SERPL-MCNC: 19 MG/DL — HIGH (ref 7–18)
BUN SERPL-MCNC: 22 MG/DL — HIGH (ref 7–18)
CALCIUM SERPL-MCNC: 8.1 MG/DL — LOW (ref 8.4–10.5)
CALCIUM SERPL-MCNC: 8.4 MG/DL — SIGNIFICANT CHANGE UP (ref 8.4–10.5)
CHLORIDE SERPL-SCNC: 105 MMOL/L — SIGNIFICANT CHANGE UP (ref 96–108)
CHLORIDE SERPL-SCNC: 105 MMOL/L — SIGNIFICANT CHANGE UP (ref 96–108)
CHOLEST SERPL-MCNC: 182 MG/DL — SIGNIFICANT CHANGE UP
CO2 SERPL-SCNC: 24 MMOL/L — SIGNIFICANT CHANGE UP (ref 22–31)
CO2 SERPL-SCNC: 29 MMOL/L — SIGNIFICANT CHANGE UP (ref 22–31)
CREAT SERPL-MCNC: 1.19 MG/DL — SIGNIFICANT CHANGE UP (ref 0.5–1.3)
CREAT SERPL-MCNC: 1.3 MG/DL — SIGNIFICANT CHANGE UP (ref 0.5–1.3)
CRP SERPL-MCNC: 129 MG/L — HIGH
D DIMER BLD IA.RAPID-MCNC: 388 NG/ML DDU — HIGH
EOSINOPHIL # BLD AUTO: 0.27 K/UL — SIGNIFICANT CHANGE UP (ref 0–0.5)
EOSINOPHIL NFR BLD AUTO: 4.6 % — SIGNIFICANT CHANGE UP (ref 0–6)
ESTIMATED AVERAGE GLUCOSE: 94 MG/DL — SIGNIFICANT CHANGE UP (ref 68–114)
GLUCOSE SERPL-MCNC: 83 MG/DL — SIGNIFICANT CHANGE UP (ref 70–99)
GLUCOSE SERPL-MCNC: 84 MG/DL — SIGNIFICANT CHANGE UP (ref 70–99)
HCT VFR BLD CALC: 35.7 % — LOW (ref 39–50)
HCT VFR BLD CALC: 39.2 % — SIGNIFICANT CHANGE UP (ref 39–50)
HDLC SERPL-MCNC: 44 MG/DL — SIGNIFICANT CHANGE UP
HGB BLD-MCNC: 11.7 G/DL — LOW (ref 13–17)
HGB BLD-MCNC: 12.7 G/DL — LOW (ref 13–17)
IMM GRANULOCYTES NFR BLD AUTO: 1 % — SIGNIFICANT CHANGE UP (ref 0–1.5)
INR BLD: 1.67 RATIO — HIGH (ref 0.88–1.16)
LIPID PNL WITH DIRECT LDL SERPL: 112 MG/DL — HIGH
LYMPHOCYTES # BLD AUTO: 0.76 K/UL — LOW (ref 1–3.3)
LYMPHOCYTES # BLD AUTO: 12.9 % — LOW (ref 13–44)
MAGNESIUM SERPL-MCNC: 1.7 MG/DL — SIGNIFICANT CHANGE UP (ref 1.6–2.6)
MCHC RBC-ENTMCNC: 24.7 PG — LOW (ref 27–34)
MCHC RBC-ENTMCNC: 24.8 PG — LOW (ref 27–34)
MCHC RBC-ENTMCNC: 32.4 GM/DL — SIGNIFICANT CHANGE UP (ref 32–36)
MCHC RBC-ENTMCNC: 32.8 GM/DL — SIGNIFICANT CHANGE UP (ref 32–36)
MCV RBC AUTO: 75.6 FL — LOW (ref 80–100)
MCV RBC AUTO: 76.3 FL — LOW (ref 80–100)
MELD SCORE WITH DIALYSIS: 26 POINTS — SIGNIFICANT CHANGE UP
MELD SCORE WITHOUT DIALYSIS: 14 POINTS — SIGNIFICANT CHANGE UP
MONOCYTES # BLD AUTO: 0.63 K/UL — SIGNIFICANT CHANGE UP (ref 0–0.9)
MONOCYTES NFR BLD AUTO: 10.7 % — SIGNIFICANT CHANGE UP (ref 2–14)
NEUTROPHILS # BLD AUTO: 4.17 K/UL — SIGNIFICANT CHANGE UP (ref 1.8–7.4)
NEUTROPHILS NFR BLD AUTO: 70.5 % — SIGNIFICANT CHANGE UP (ref 43–77)
NON HDL CHOLESTEROL: 138 MG/DL — HIGH
NRBC # BLD: 0 /100 WBCS — SIGNIFICANT CHANGE UP (ref 0–0)
NRBC # BLD: 0 /100 WBCS — SIGNIFICANT CHANGE UP (ref 0–0)
PHOSPHATE SERPL-MCNC: 2 MG/DL — LOW (ref 2.5–4.5)
PLATELET # BLD AUTO: 142 K/UL — LOW (ref 150–400)
PLATELET # BLD AUTO: 168 K/UL — SIGNIFICANT CHANGE UP (ref 150–400)
POTASSIUM SERPL-MCNC: 3.9 MMOL/L — SIGNIFICANT CHANGE UP (ref 3.5–5.3)
POTASSIUM SERPL-MCNC: 5 MMOL/L — SIGNIFICANT CHANGE UP (ref 3.5–5.3)
POTASSIUM SERPL-SCNC: 3.9 MMOL/L — SIGNIFICANT CHANGE UP (ref 3.5–5.3)
POTASSIUM SERPL-SCNC: 5 MMOL/L — SIGNIFICANT CHANGE UP (ref 3.5–5.3)
PROCALCITONIN SERPL-MCNC: 2.2 NG/ML — HIGH (ref 0.02–0.1)
PROT SERPL-MCNC: 5.9 G/DL — LOW (ref 6–8.3)
PROTHROM AB SERPL-ACNC: 19.4 SEC — HIGH (ref 10.6–13.6)
RBC # BLD: 4.72 M/UL — SIGNIFICANT CHANGE UP (ref 4.2–5.8)
RBC # BLD: 5.14 M/UL — SIGNIFICANT CHANGE UP (ref 4.2–5.8)
RBC # FLD: 20.1 % — HIGH (ref 10.3–14.5)
RBC # FLD: 21.1 % — HIGH (ref 10.3–14.5)
SODIUM SERPL-SCNC: 137 MMOL/L — SIGNIFICANT CHANGE UP (ref 135–145)
SODIUM SERPL-SCNC: 139 MMOL/L — SIGNIFICANT CHANGE UP (ref 135–145)
TRIGL SERPL-MCNC: 130 MG/DL — SIGNIFICANT CHANGE UP
TSH SERPL-MCNC: 2.67 UU/ML — SIGNIFICANT CHANGE UP (ref 0.34–4.82)
WBC # BLD: 5.57 K/UL — SIGNIFICANT CHANGE UP (ref 3.8–10.5)
WBC # BLD: 5.91 K/UL — SIGNIFICANT CHANGE UP (ref 3.8–10.5)
WBC # FLD AUTO: 5.57 K/UL — SIGNIFICANT CHANGE UP (ref 3.8–10.5)
WBC # FLD AUTO: 5.91 K/UL — SIGNIFICANT CHANGE UP (ref 3.8–10.5)

## 2022-01-03 PROCEDURE — 99233 SBSQ HOSP IP/OBS HIGH 50: CPT

## 2022-01-03 PROCEDURE — 74176 CT ABD & PELVIS W/O CONTRAST: CPT | Mod: 26

## 2022-01-03 RX ORDER — POTASSIUM PHOSPHATE, MONOBASIC POTASSIUM PHOSPHATE, DIBASIC 236; 224 MG/ML; MG/ML
15 INJECTION, SOLUTION INTRAVENOUS ONCE
Refills: 0 | Status: COMPLETED | OUTPATIENT
Start: 2022-01-03 | End: 2022-01-03

## 2022-01-03 RX ORDER — ACETAMINOPHEN 500 MG
650 TABLET ORAL ONCE
Refills: 0 | Status: COMPLETED | OUTPATIENT
Start: 2022-01-03 | End: 2022-01-03

## 2022-01-03 RX ORDER — SODIUM CHLORIDE 9 MG/ML
1000 INJECTION INTRAMUSCULAR; INTRAVENOUS; SUBCUTANEOUS
Refills: 0 | Status: DISCONTINUED | OUTPATIENT
Start: 2022-01-03 | End: 2022-01-07

## 2022-01-03 RX ORDER — PANTOPRAZOLE SODIUM 20 MG/1
40 TABLET, DELAYED RELEASE ORAL EVERY 12 HOURS
Refills: 0 | Status: DISCONTINUED | OUTPATIENT
Start: 2022-01-03 | End: 2022-01-06

## 2022-01-03 RX ADMIN — Medication 650 MILLIGRAM(S): at 23:42

## 2022-01-03 RX ADMIN — PREGABALIN 1000 MICROGRAM(S): 225 CAPSULE ORAL at 11:58

## 2022-01-03 RX ADMIN — Medication 100 MILLIGRAM(S): at 11:58

## 2022-01-03 RX ADMIN — POTASSIUM PHOSPHATE, MONOBASIC POTASSIUM PHOSPHATE, DIBASIC 62.5 MILLIMOLE(S): 236; 224 INJECTION, SOLUTION INTRAVENOUS at 10:17

## 2022-01-03 RX ADMIN — Medication 1 TABLET(S): at 11:58

## 2022-01-03 RX ADMIN — PANTOPRAZOLE SODIUM 40 MILLIGRAM(S): 20 TABLET, DELAYED RELEASE ORAL at 16:56

## 2022-01-03 RX ADMIN — LACTULOSE 15 GRAM(S): 10 SOLUTION ORAL at 16:55

## 2022-01-03 RX ADMIN — LACTULOSE 15 GRAM(S): 10 SOLUTION ORAL at 05:32

## 2022-01-03 RX ADMIN — Medication 20 MILLIGRAM(S): at 05:32

## 2022-01-03 RX ADMIN — PANTOPRAZOLE SODIUM 40 MILLIGRAM(S): 20 TABLET, DELAYED RELEASE ORAL at 05:32

## 2022-01-03 NOTE — CONSULT NOTE ADULT - SUBJECTIVE AND OBJECTIVE BOX
Patient is a 81y old  Male who presents with a chief complaint of Melena (03 Jan 2022 12:23)     .     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 Pt with nausea and generalized abdominal pain 1/1 which was relieved by defecating. Pt reports constipation. Pt is a poor historian. History is taken from wife -[phone]. (02 Jan 2022 21:54)          REVIEW OF SYSTEMS  Constitutional:   No fever, no fatigue, no pallor, no night sweats, no weight loss.  HEENT:   No eye pain, no vision changes, no icterus, no mouth ulcers.  Respiratory:   No shortness of breath, no cough, no respiratory distress.   Cardiovascular:   No chest pain, no palpitations.   Gastrointestinal: No abdominal pain, no nausea, no vomiting , no diahrrea, no constipation, no hematochezia,no melena.  Skin:   No rashes, no jaundice, no eczema.   Musculoskeletal:   No joint pain, no swelling, no myalgia.   Neurologic:   No headache, no seizure, no weakness.   Genitourinary:   No dysuria, no decreased urine output.  Psychiatric:  No depression, no anxiety,   Endocrine:   No thyroid disease, no diabetes.  Heme/Lymphatic:   No anemia, no blood transfusions, no lymph node enlargement, no bleeding, no bruising.  ___________________________________________________________________________________________  Allergies    No Known Allergies    Intolerances      MEDICATIONS  (STANDING):  cyanocobalamin 1000 MICROGram(s) Oral daily  furosemide    Tablet 20 milliGRAM(s) Oral daily  lactulose Syrup 15 Gram(s) Oral two times a day  multivitamin 1 Tablet(s) Oral daily  pantoprazole  Injectable 40 milliGRAM(s) IV Push every 12 hours  propranolol 10 milliGRAM(s) Oral daily  sodium chloride 0.9%. 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    Liver cirrhosis  alcoholic/WALL cirrhosis c/b variceal bleed s/p banding (8/2018) and hepatic encephalopathy (several years ago)    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)    Pancreatitis  2/2 choledocholithiasis s/p ERCP with stone extraction and plastic stent placement (11/2019, stent now removed)    Hematochezia  2/2 colonic AVMs s/p cauterization and hemorrhoids (11/2019)    Melena  2/2 duodenal ulcers s/p argon plasma coagulation (4/2020)    Chronic kidney disease (CKD)    H/O inguinal hernia repair    History of repair of hip fracture  R hip    History of hip replacement  10/2020      FAMILY HISTORY:  Family history of ovarian cancer (Sibling)      Social History: No hsitory of : Tobacco use, IVDA, EToH  ______________________________________________________________________________________    PHYSICAL EXAM    Daily     Daily   BMI: 32 (01-02 @ 16:35)  Change in Weight:  Vital Signs Last 24 Hrs  T(C): 36.3 (04 Jan 2022 05:27), Max: 36.8 (03 Jan 2022 22:11)  T(F): 97.4 (04 Jan 2022 05:27), Max: 98.2 (03 Jan 2022 22:11)  HR: 57 (04 Jan 2022 05:27) (57 - 64)  BP: 117/63 (04 Jan 2022 05:27) (107/62 - 124/71)  BP(mean): 76 (04 Jan 2022 05:27) (76 - 76)  RR: 18 (04 Jan 2022 05:27) (18 - 18)  SpO2: 100% (04 Jan 2022 05:27) (98% - 100%)    General:  Well developed, well nourished, alert and active, no pallor, NAD.  HEENT:    Normal appearance of conjunctiva, ears, nose, lips, oropharynx, and oral mucosa, anicteric.  Neck:  No masses, no asymmetry.  Lymph Nodes:  No lymphadenopathy.   Cardiovascular:  RRR normal S1/S2, no murmur.  Respiratory:  CTA B/L, normal respiratory effort.   Abdominal:   soft, no masses or tenderness, normoactive BS, NT/ND, no HSM.  Extremities:   No clubbing or cyanosis, normal capillary refill, no edema.   Skin:   No rash, jaundice, lesions, eczema.   Musculoskeletal:  No joint swelling, erythema or tenderness.   Neuro: No focal deficits.   Other:   _______________________________________________________________________________________________  Lab Results:                          12.7   5.57  )-----------( 168      ( 03 Jan 2022 18:41 )             39.2     01-03    137  |  105  |  19<H>  ----------------------------<  84  3.9   |  24  |  1.19    Ca    8.4      03 Jan 2022 06:13  Phos  2.0     01-03  Mg     1.7     01-03    TPro  5.9<L>  /  Alb  2.2<L>  /  TBili  1.1  /  DBili  x   /  AST  54<H>  /  ALT  19  /  AlkPhos  223<H>  01-03    LIVER FUNCTIONS - ( 03 Jan 2022 06:13 )  Alb: 2.2 g/dL / Pro: 5.9 g/dL / ALK PHOS: 223 U/L / ALT: 19 U/L DA / AST: 54 U/L / GGT: x           PT/INR - ( 03 Jan 2022 06:13 )   PT: 19.4 sec;   INR: 1.67 ratio         PTT - ( 03 Jan 2022 06:13 )  PTT:30.4 sec  C-Reactive Protein, Serum: 129 mg/L (01-03 @ 17:39)        Stool Results:          RADIOLOGY RESULTS:  < from: ERCP (06.22.21 @ 08:16) >    [facility]  _______________________________________________________________________________  Patient Name: Anthony Florez            Procedure Date: 6/22/2021 8:16 AM  MRN: [id number]                     Account Number: [id number]  YOB: 1940              Admit Type: Outpatient  Room: [place] [address]                         Gender: Male  Attending MD: AUGUSTINA GARRETT MD      _______________________________________________________________________________     Procedure:           ERCP  Indications:         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 ERCP scheduled for                        stent removal and therapy.  Providers:           AUGUSTINA GARRETT MD  Medicines:           General Anesthesia                       Fluoro: 3.7 min/39.8 mgy/77 kV                       IVF                       Cipro 400 mg IV  Complications:       No immediate complications.  Procedure:           Pre-Anesthesia Assessment:                       - Prior to the procedure, a History and Physical was                        performed, and patient medications, allergies and                        sensitivities were reviewed. The patient's tolerance of                      previous anesthesia was reviewed.                       After obtaining informed consent, the scope was passed                        under direct vision. Throughout the procedure, the                        patient's blood pressure, pulse, and oxygen saturations                        were monitored continuously. The ERCP was introduced                        through the mouth, and advanced to the duodenum and used                        to inject contrast into the bile duct. The ERCP was                        accomplished with ease. The patient tolerated the                        procedure well.                                                                                   Findings:       EGD:       -The esophagus was normal.       -The stomach was normal.       -The duodenal bulb was normal. D2 contained a stent.       ERCP:       -The duodenoscope was advanced to the ampulla.       -The prior placed stent was seen with biliary sphincterotomy.       -The stent was removed with a snare.       -The bile duct was cannulated using a preloaded Jag revolution        sphincterotome.       -Contrast was injected. I acquired and interpreted the images. There was        a GB stone protruding into the CBD causing biliary obstruction.       -Digital cholangioscopy was performed.       -The stone was visualized and the EHL performed fragmenting the stone.       -A balloon catheter was sued to sweep the stone fragments.       -Repeat cholangiogram showed decreased stone burden in the CBD.       -A 10Fr-7 cm stent was placed. There was good flow of bile.                                                                                   Impression:          - Endoscopic retrograde cholangiopancreatography with                   stent exchange, cholangioscopy with EHL, and stone                        removal.  Recommendation:      - Discharge patient to home (ambulatory).                       - Repeat ERCP in 3-4 months for stent removal and          reevaluation.                                                                                   Attending Participation:       I personally performed the entire procedure.              ___________________  AUGUSTINA GARRETT MD  6/22/2021 10:31:47 AM  This report has been signed electronically.  Number of Addenda: 0    Note Initiated On: 6/22/2021 8:16 AM    < end of copied text >  < from: ERCP (06.22.21 @ 08:16) >    [facility]  _______________________________________________________________________________  Patient Name: Anthony Florez            Procedure Date: 6/22/2021 8:16 AM  MRN: [id number]                     Account Number: [id number]  YOB: 1940              Admit Type: Outpatient  Room: ENDO 01                         Gender: Male  Attending MD: AUGUSTINA GARRETT MD      _______________________________________________________________________________     Procedure:           ERCP  Indications:         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 ERCP scheduled for                        stent removal and therapy.  Providers:           AUGUSTINA GARRETT MD  Medicines:           General Anesthesia                       Fluoro: 3.7 min/39.8 mgy/77 kV                       IVF                       Cipro 400 mg IV  Complications:       No immediate complications.  Procedure:           Pre-Anesthesia Assessment:                       - Prior to the procedure, a History and Physical was                        performed, and patient medications, allergies and                        sensitivities were reviewed. The patient's tolerance of                      previous anesthesia was reviewed.                       After obtaining informed consent, the scope was passed                        under direct vision. Throughout the procedure, the                        patient's blood pressure, pulse, and oxygen saturations                        were monitored continuously. The ERCP was introduced                        through the mouth, and advanced to the duodenum and used                        to inject contrast into the bile duct. The ERCP was                        accomplished with ease. The patient tolerated the                        procedure well.                                                                                   Findings:       EGD:       -The esophagus was normal.       -The stomach was normal.       -The duodenal bulb was normal. D2 contained a stent.       ERCP:       -The duodenoscope was advanced to the ampulla.       -The prior placed stent was seen with biliary sphincterotomy.       -The stent was removed with a snare.       -The bile duct was cannulated using a preloaded Jag revolution        sphincterotome.       -Contrast was injected. I acquired and interpreted the images. There was        a GB stone protruding into the CBD causing biliary obstruction.       -Digital cholangioscopy was performed.       -The stone was visualized and the EHL performed fragmenting the stone.       -A balloon catheter was sued to sweep the stone fragments.       -Repeat cholangiogram showed decreased stone burden in the CBD.       -A 10Fr-7 cm stent was placed. There was good flow of bile.                                                                                   Impression:          - Endoscopic retrograde cholangiopancreatography with                   stent exchange, cholangioscopy with EHL, and stone                        removal.  Recommendation:      - Discharge patient to home (ambulatory).                       - Repeat ERCP in 3-4 months for stent removal and          reevaluation.                                                                                   Attending Participation:       I personally performed the entire procedure.              ___________________  AUGUSTINA GARRETT MD  6/22/2021 10:31:47 AM  This report has been signed electronically.  Number of Addenda: 0    Note Initiated On: 6/22/2021 8:16 AM    < end of copied text >    SURGICAL PATHOLOGY:

## 2022-01-03 NOTE — PROGRESS NOTE ADULT - PROBLEM SELECTOR PLAN 2
had plastic stent placed in 7/2021, needs replacement done by Dr. Neftaly Mckeon    AST/ALT 83/23  Bili wnl  known gall bladder disease  CT + large stones   GI Dr. Licea consulted

## 2022-01-03 NOTE — PATIENT PROFILE ADULT - CENTRAL VENOUS CATHETER/PICC LINE
Routing refill request to provider for review/approval because:  Drug not on the [facility] refill protocol     Alpa Brown RN   [facility] [place]  -- Triage Nurse        no

## 2022-01-03 NOTE — PROGRESS NOTE ADULT - ASSESSMENT
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  In ED: no active bleeding, CT abd showed Cirrhosis.  Hemoglobin stable   last colonoscopy 4/2020 -showing rectal varices, vascular ectasia in ascending in descending colon s/p ablation  Admitted for evaluation of GI bleeding   GI consulted     Pt seen at bedside, NAD. Denies abdominal pain, rectal bleeding. No bowel movement since admission   No other complaints. Hg 11.4. NPO   pending GI evaluation

## 2022-01-03 NOTE — PROGRESS NOTE ADULT - ATTENDING COMMENTS
a/p# Acute blood loss anemia # KAY # Chronic Liver cirrhosis  -vitals stable, no active s/s of bleeding  -clear liquid diet, check cbc q12 hrs. ppi  -patient exposed to covid+ patient, placed on precautions- will test as per infection control protocol

## 2022-01-03 NOTE — PROGRESS NOTE ADULT - PROBLEM SELECTOR PLAN 1
known history of GI bleed, no cactive bleeding   c/w protonix 40 po bid  Hb 11.4, baseline hb ~10-11  Monitor CBC q8  NPO for now  GI - Dr. Licea consulted

## 2022-01-03 NOTE — CONSULT NOTE ADULT - ASSESSMENT
81 year old male with dark stools       Dark stool   No overt bleeding at this time   Hemoglobin is stable   ? NSAID use althou patient is a poor hisootrian   PPI BID   advacne diet   No role for endoscopy at this time     CBD stone   TO follow with Dr. Mckeon for further EHL and stone removal

## 2022-01-04 ENCOUNTER — TRANSCRIPTION ENCOUNTER (OUTPATIENT)
Age: 82
End: 2022-01-04

## 2022-01-04 DIAGNOSIS — Z20.822 CONTACT WITH AND (SUSPECTED) EXPOSURE TO COVID-19: ICD-10-CM

## 2022-01-04 DIAGNOSIS — Z02.9 ENCOUNTER FOR ADMINISTRATIVE EXAMINATIONS, UNSPECIFIED: ICD-10-CM

## 2022-01-04 DIAGNOSIS — K92.2 GASTROINTESTINAL HEMORRHAGE, UNSPECIFIED: ICD-10-CM

## 2022-01-04 DIAGNOSIS — M48.02 SPINAL STENOSIS, CERVICAL REGION: ICD-10-CM

## 2022-01-04 LAB
ANION GAP SERPL CALC-SCNC: 6 MMOL/L — SIGNIFICANT CHANGE UP (ref 5–17)
BUN SERPL-MCNC: 15 MG/DL — SIGNIFICANT CHANGE UP (ref 7–18)
CALCIUM SERPL-MCNC: 8.7 MG/DL — SIGNIFICANT CHANGE UP (ref 8.4–10.5)
CHLORIDE SERPL-SCNC: 105 MMOL/L — SIGNIFICANT CHANGE UP (ref 96–108)
CO2 SERPL-SCNC: 29 MMOL/L — SIGNIFICANT CHANGE UP (ref 22–31)
CREAT ?TM UR-MCNC: <13 MG/DL — SIGNIFICANT CHANGE UP
CREAT SERPL-MCNC: 1.18 MG/DL — SIGNIFICANT CHANGE UP (ref 0.5–1.3)
GLUCOSE SERPL-MCNC: 94 MG/DL — SIGNIFICANT CHANGE UP (ref 70–99)
HCT VFR BLD CALC: 36.3 % — LOW (ref 39–50)
HGB BLD-MCNC: 12 G/DL — LOW (ref 13–17)
MCHC RBC-ENTMCNC: 25 PG — LOW (ref 27–34)
MCHC RBC-ENTMCNC: 33.1 GM/DL — SIGNIFICANT CHANGE UP (ref 32–36)
MCV RBC AUTO: 75.6 FL — LOW (ref 80–100)
NRBC # BLD: 0 /100 WBCS — SIGNIFICANT CHANGE UP (ref 0–0)
PLATELET # BLD AUTO: 145 K/UL — LOW (ref 150–400)
POTASSIUM SERPL-MCNC: 4.6 MMOL/L — SIGNIFICANT CHANGE UP (ref 3.5–5.3)
POTASSIUM SERPL-SCNC: 4.6 MMOL/L — SIGNIFICANT CHANGE UP (ref 3.5–5.3)
RBC # BLD: 4.8 M/UL — SIGNIFICANT CHANGE UP (ref 4.2–5.8)
RBC # FLD: 21.1 % — HIGH (ref 10.3–14.5)
SARS-COV-2 RNA SPEC QL NAA+PROBE: SIGNIFICANT CHANGE UP
SODIUM SERPL-SCNC: 140 MMOL/L — SIGNIFICANT CHANGE UP (ref 135–145)
SODIUM UR-SCNC: 50 MMOL/L — SIGNIFICANT CHANGE UP
WBC # BLD: 3.96 K/UL — SIGNIFICANT CHANGE UP (ref 3.8–10.5)
WBC # FLD AUTO: 3.96 K/UL — SIGNIFICANT CHANGE UP (ref 3.8–10.5)

## 2022-01-04 PROCEDURE — 99233 SBSQ HOSP IP/OBS HIGH 50: CPT

## 2022-01-04 RX ADMIN — SODIUM CHLORIDE 100 MILLILITER(S): 9 INJECTION INTRAMUSCULAR; INTRAVENOUS; SUBCUTANEOUS at 08:53

## 2022-01-04 RX ADMIN — Medication 20 MILLIGRAM(S): at 06:09

## 2022-01-04 RX ADMIN — Medication 1 TABLET(S): at 11:52

## 2022-01-04 RX ADMIN — LACTULOSE 15 GRAM(S): 10 SOLUTION ORAL at 17:19

## 2022-01-04 RX ADMIN — Medication 650 MILLIGRAM(S): at 00:50

## 2022-01-04 RX ADMIN — PANTOPRAZOLE SODIUM 40 MILLIGRAM(S): 20 TABLET, DELAYED RELEASE ORAL at 06:07

## 2022-01-04 RX ADMIN — LACTULOSE 15 GRAM(S): 10 SOLUTION ORAL at 06:07

## 2022-01-04 RX ADMIN — PANTOPRAZOLE SODIUM 40 MILLIGRAM(S): 20 TABLET, DELAYED RELEASE ORAL at 17:19

## 2022-01-04 RX ADMIN — PREGABALIN 1000 MICROGRAM(S): 225 CAPSULE ORAL at 11:51

## 2022-01-04 RX ADMIN — Medication 100 MILLIGRAM(S): at 11:52

## 2022-01-04 NOTE — PROGRESS NOTE ADULT - ASSESSMENT
81 year old male with dark stools       Dark stool   No overt bleeding at this time   Hemoglobin is stable   ? NSAID use although patient is a poor historian   PPI BID   advance diet   No role for endoscopy at this time     CBD stone   Bili is normal (stent is patent)   TO follow with Dr. Mckeon for further EHL and stone removal     Discharge planning

## 2022-01-04 NOTE — PROGRESS NOTE ADULT - PROBLEM SELECTOR PLAN 2
had plastic stent placed in 7/2021, needs replacement done by Dr. Neftaly Mckeon    AST/ALT 83/23  Bili wnl  known gall bladder disease  CT + large stones   GI Dr. Licea following, no role for EGD this time. rec to follow with Dr. Mckeon for further EHL and stone removal.  Surgery consult known history of anemia  likely acd  f/u anemia panel

## 2022-01-04 NOTE — PROGRESS NOTE ADULT - PROBLEM SELECTOR PLAN 10
from home  pt exposed to COVID, f/u repeat PCR  on clear  f/u surgery exposed to COVID pt  repeat PCR negative 1/4  Pt is vaccinated  per infection control, will need 5 days isolation.

## 2022-01-04 NOTE — DISCHARGE NOTE PROVIDER - NSDCCPCAREPLAN_GEN_ALL_CORE_FT
PRINCIPAL DISCHARGE DIAGNOSIS  Diagnosis: GIB (gastrointestinal bleeding)  Assessment and Plan of Treatment: There are 2 common types of GI Bleed, Upper GI Bleed and Lower GI Bleed.  Upper GI Bleed affects the esophagus, stomach, and first part of the small intestine. Lower GI Bleed affects the colon and rectum.  Upper GI Bleed signs and symptoms to notify your Health Care Provider are vomiting blood, or coffee ground vomitus, and bowel movements that look like black tar.  Lower GI Bleed signs and symptoms to notify your health care provider are bright red bloody bowel movements.   Take your medications as prescribed by your Gastroenterologist.  If you have had an Endoscopy or Colonoscopy, follow up with your Gastroenterologist for Pathology results.  Avoid NSAIDs unless your Health Care Provider tells you that it is ok (Aspirin, Ibuprofen, Advil, Motrin, Aleve).  Follow up with your Gastroenterologist within 1-2 weeks of discharge.      SECONDARY DISCHARGE DIAGNOSES  Diagnosis: Anemia of chronic disease  Assessment and Plan of Treatment: Symptoms to report, bleeding, palpitations, fatigue, pale skin, cold skin, dizziness. Take medications as ordered by PCP      Diagnosis: Cholelithiasis  Assessment and Plan of Treatment: Follow up with outpatient gastroenterologist dr. Mike Gee for stone removal and EHL procedure. Office [phone], office will call for an appointment.    Diagnosis: KAY (acute kidney injury)  Assessment and Plan of Treatment: Acute kidney injury (KAY) is when the kidneys suddenly stop working effectively.  This can happen when there is less blood flow to the kidneys, there is kidney damage or the path for urine to leave the body is blocked.  KAY can cause decreased urination, blood in the urine, swelling, vomiting, weakness, confusion and/or seisures.  The treatment depends on the cause and severity of the injury.  In any case, while your kidneys are healing you should avoid agents that can cause kidney injury.  Some of these agents include NSAIDs, Gadolinium contrast, and Phosphate containing enemas.      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  Take all medication as prescribed.  Follow up with your medical doctor for routine blood pressure monitoring at your next visit.  Notify your doctor if you have any of the following symptoms:   Dizziness, Lightheadedness, Blurry vision, Headache, Chest pain, Shortness of breath       PRINCIPAL DISCHARGE DIAGNOSIS  Diagnosis: GIB (gastrointestinal bleeding)  Assessment and Plan of Treatment: Admitted for GIB wiht melena. Hemoglobin level stable. No active bleeding during hospital stay. CT abdomen showed Cirrhosis. Interval placement of biliary stent. Large gallstone reidentified.   Followed by gastroenterologist and no role for EGD this time. Recommended to follow up with outpatient GI dr. Mike Gee for stone removal.   There are 2 common types of GI Bleed, Upper GI Bleed and Lower GI Bleed.  Upper GI Bleed affects the esophagus, stomach, and first part of the small intestine. Lower GI Bleed affects the colon and rectum.  Upper GI Bleed signs and symptoms to notify your Health Care Provider are vomiting blood, or coffee ground vomitus, and bowel movements that look like black tar.  Lower GI Bleed signs and symptoms to notify your health care provider are bright red bloody bowel movements.   Take your medications as prescribed by your Gastroenterologist.  If you have had an Endoscopy or Colonoscopy, follow up with your Gastroenterologist for Pathology results.  Avoid NSAIDs unless your Health Care Provider tells you that it is ok (Aspirin, Ibuprofen, Advil, Motrin, Aleve).  Follow up with your Gastroenterologist within 1-2 weeks of discharge.      SECONDARY DISCHARGE DIAGNOSES  Diagnosis: Cholelithiasis  Assessment and Plan of Treatment: CT abdomen showed Cirrhosis. Interval placement of biliary stent. Large gallstone reidentified. Follow up with outpatient gastroenterologist dr. Mike Gee for stone removal and EHL procedure. Office [phone], office will call for an appointment.    Diagnosis: Cervical spinal stenosis  Assessment and Plan of Treatment: HX cervical spinal stenosis, underwent decompression 7/2021.   Concerning about lower leg weakness. PT consulted--------  continue pain managment as prescribed.    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 continue medications as prescribed.   continue following with your Gastroenterologist and hepatologist after discharge.    Diagnosis: KAY (acute kidney injury)  Assessment and Plan of Treatment: Resolving.    Acute kidney injury (KAY) is when the kidneys suddenly stop working effectively.  This can happen when there is less blood flow to the kidneys, there is kidney damage or the path for urine to leave the body is blocked.  KAY can cause decreased urination, blood in the urine, swelling, vomiting, weakness, confusion and/or seisures.  The treatment depends on the cause and severity of the injury.  In any case, while your kidneys are healing you should avoid agents that can cause kidney injury.  Some of these agents include NSAIDs, Gadolinium contrast, and Phosphate containing enemas.      Diagnosis: HTN (hypertension)  Assessment and Plan of Treatment: Low salt diet  Activity as tolerated.  Take all medication as prescribed.  Follow up with your medical doctor for routine blood pressure monitoring at your next visit.  Notify your doctor if you have any of the following symptoms:   Dizziness, Lightheadedness, Blurry vision, Headache, Chest pain, Shortness of breath      Diagnosis: Transaminitis  Assessment and Plan of Treatment: Followed for elevated liver enzyme, history of liver cirrhosis. Enzymes are trending down. continue to follow up with your doctor after discharge. Avoid hepatotoxic medications and alcohol consumption.    Diagnosis: Anemia of chronic disease  Assessment and Plan of Treatment: Symptoms to report, bleeding, palpitations, fatigue, pale skin, cold skin, dizziness. Take medications as ordered by PCP       PRINCIPAL DISCHARGE DIAGNOSIS  Diagnosis: GIB (gastrointestinal bleeding)  Assessment and Plan of Treatment: Admitted for GIB wiht melena. Hemoglobin level stable. No active bleeding during hospital stay. CT abdomen showed Cirrhosis. Interval placement of biliary stent. Large gallstone reidentified.   Followed by gastroenterologist and no role for EGD this time. Recommended to follow up with outpatient GI dr. Mike Gee for stone removal.   There are 2 common types of GI Bleed, Upper GI Bleed and Lower GI Bleed.  Upper GI Bleed affects the esophagus, stomach, and first part of the small intestine. Lower GI Bleed affects the colon and rectum.  Upper GI Bleed signs and symptoms to notify your Health Care Provider are vomiting blood, or coffee ground vomitus, and bowel movements that look like black tar.  Lower GI Bleed signs and symptoms to notify your health care provider are bright red bloody bowel movements.   Take your medications as prescribed by your Gastroenterologist.  If you have had an Endoscopy or Colonoscopy, follow up with your Gastroenterologist for Pathology results.  Avoid NSAIDs unless your Health Care Provider tells you that it is ok (Aspirin, Ibuprofen, Advil, Motrin, Aleve).  Follow up with your Gastroenterologist within 1-2 weeks of discharge.      SECONDARY DISCHARGE DIAGNOSES  Diagnosis: Cholelithiasis  Assessment and Plan of Treatment: CT abdomen showed Cirrhosis. Interval placement of biliary stent. Large gallstone reidentified. Follow up with outpatient gastroenterologist dr. Mike Gee for stone removal and EHL procedure. Office [phone], office will call for an appointment.    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 continue medications as prescribed.   continue following with your Gastroenterologist and hepatologist after discharge.    Diagnosis: KAY (acute kidney injury)  Assessment and Plan of Treatment: Resolving.    Acute kidney injury (KAY) is when the kidneys suddenly stop working effectively.  This can happen when there is less blood flow to the kidneys, there is kidney damage or the path for urine to leave the body is blocked.  KAY can cause decreased urination, blood in the urine, swelling, vomiting, weakness, confusion and/or seisures.  The treatment depends on the cause and severity of the injury.  In any case, while your kidneys are healing you should avoid agents that can cause kidney injury.  Some of these agents include NSAIDs, Gadolinium contrast, and Phosphate containing enemas.      Diagnosis: HTN (hypertension)  Assessment and Plan of Treatment: Low salt diet  Activity as tolerated.  Take all medication as prescribed.  Follow up with your medical doctor for routine blood pressure monitoring at your next visit.  Notify your doctor if you have any of the following symptoms:   Dizziness, Lightheadedness, Blurry vision, Headache, Chest pain, Shortness of breath      Diagnosis: Transaminitis  Assessment and Plan of Treatment: Followed for elevated liver enzyme, history of liver cirrhosis. Enzymes are trending down. continue to follow up with your doctor after discharge. Avoid hepatotoxic medications and alcohol consumption.    Diagnosis: Anemia of chronic disease  Assessment and Plan of Treatment: Symptoms to report, bleeding, palpitations, fatigue, pale skin, cold skin, dizziness. Take medications as ordered by PCP      Diagnosis: Cervical spinal stenosis  Assessment and Plan of Treatment: HX cervical spinal stenosis, underwent decompression 7/2021.   Concerning about lower leg weakness. PT consulted, recommends ROBINSON.   continue pain managment as prescribed.     PRINCIPAL DISCHARGE DIAGNOSIS  Diagnosis: GIB (gastrointestinal bleeding)  Assessment and Plan of Treatment: Admitted for GIB wiht melena. Hemoglobin level stable. No active bleeding during hospital stay. CT abdomen showed Cirrhosis. Interval placement of biliary stent. Large gallstone reidentified.   Followed by gastroenterologist and no role for EGD this time. Recommended to follow up with outpatient GI dr. Mike Gee for stone removal.   There are 2 common types of GI Bleed, Upper GI Bleed and Lower GI Bleed.  Upper GI Bleed affects the esophagus, stomach, and first part of the small intestine. Lower GI Bleed affects the colon and rectum.  Upper GI Bleed signs and symptoms to notify your Health Care Provider are vomiting blood, or coffee ground vomitus, and bowel movements that look like black tar.  Lower GI Bleed signs and symptoms to notify your health care provider are bright red bloody bowel movements.   Take your medications as prescribed by your Gastroenterologist.  If you have had an Endoscopy or Colonoscopy, follow up with your Gastroenterologist for Pathology results.  Avoid NSAIDs unless your Health Care Provider tells you that it is ok (Aspirin, Ibuprofen, Advil, Motrin, Aleve).  Follow up with your Gastroenterologist within 1-2 weeks of discharge.      SECONDARY DISCHARGE DIAGNOSES  Diagnosis: Cholelithiasis  Assessment and Plan of Treatment: CT abdomen showed Cirrhosis. Interval placement of biliary stent. Large gallstone reidentified. Follow up with outpatient gastroenterologist dr. Mike Gee for stone removal and EHL procedure. Office [phone], office will call for an appointment.    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 continue medications as prescribed.   continue following with your Gastroenterologist and hepatologist after discharge.    Diagnosis: KAY (acute kidney injury)  Assessment and Plan of Treatment: Resolving.    Acute kidney injury (KAY) is when the kidneys suddenly stop working effectively.  This can happen when there is less blood flow to the kidneys, there is kidney damage or the path for urine to leave the body is blocked.  KAY can cause decreased urination, blood in the urine, swelling, vomiting, weakness, confusion and/or seisures.  The treatment depends on the cause and severity of the injury.  In any case, while your kidneys are healing you should avoid agents that can cause kidney injury.  Some of these agents include NSAIDs, Gadolinium contrast, and Phosphate containing enemas.      Diagnosis: HTN (hypertension)  Assessment and Plan of Treatment: Low salt diet  Activity as tolerated.  Take all medication as prescribed.  Follow up with your medical doctor for routine blood pressure monitoring at your next visit.  Notify your doctor if you have any of the following symptoms:   Dizziness, Lightheadedness, Blurry vision, Headache, Chest pain, Shortness of breath      Diagnosis: Transaminitis  Assessment and Plan of Treatment: Followed for elevated liver enzyme, history of liver cirrhosis. Enzymes are trending down. continue to follow up with your doctor after discharge. Avoid hepatotoxic medications and alcohol consumption.    Diagnosis: Anemia of chronic disease  Assessment and Plan of Treatment: Symptoms to report, bleeding, palpitations, fatigue, pale skin, cold skin, dizziness. Take medications as ordered by PCP      Diagnosis: Cervical spinal stenosis  Assessment and Plan of Treatment: HX cervical spinal stenosis, underwent decompression 7/2021.   Concerning about lower leg weakness. PT consulted, recommends ROIBNSON.   continue pain managment as prescribed.    Diagnosis: Acute blood loss anemia  Assessment and Plan of Treatment:      PRINCIPAL DISCHARGE DIAGNOSIS  Diagnosis: GIB (gastrointestinal bleeding)  Assessment and Plan of Treatment: Admitted for GIB wiht melena. Hemoglobin level stable. No active bleeding during hospital stay. CT abdomen showed Cirrhosis. Interval placement of biliary stent. Large gallstone reidentified.   Followed by gastroenterologist and no role for EGD this time. Recommended to follow up with outpatient GI dr. Mike Gee for stone removal.   There are 2 common types of GI Bleed, Upper GI Bleed and Lower GI Bleed.  Upper GI Bleed affects the esophagus, stomach, and first part of the small intestine. Lower GI Bleed affects the colon and rectum.  Upper GI Bleed signs and symptoms to notify your Health Care Provider are vomiting blood, or coffee ground vomitus, and bowel movements that look like black tar.  Lower GI Bleed signs and symptoms to notify your health care provider are bright red bloody bowel movements.   Take your medications as prescribed by your Gastroenterologist.  If you have had an Endoscopy or Colonoscopy, follow up with your Gastroenterologist for Pathology results.  Avoid NSAIDs unless your Health Care Provider tells you that it is ok (Aspirin, Ibuprofen, Advil, Motrin, Aleve).  Follow up with your Gastroenterologist within 1-2 weeks of discharge.      SECONDARY DISCHARGE DIAGNOSES  Diagnosis: Cholelithiasis  Assessment and Plan of Treatment: CT abdomen showed Cirrhosis. Interval placement of biliary stent. Large gallstone reidentified. Follow up with outpatient gastroenterologist dr. Mike Gee for stone removal and EHL procedure. Office [phone], office will call for an appointment.    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 continue medications as prescribed.   continue following with your Gastroenterologist and hepatologist after discharge.    Diagnosis: KAY (acute kidney injury)  Assessment and Plan of Treatment: Resolving.    Acute kidney injury (KAY) is when the kidneys suddenly stop working effectively.  This can happen when there is less blood flow to the kidneys, there is kidney damage or the path for urine to leave the body is blocked.  KAY can cause decreased urination, blood in the urine, swelling, vomiting, weakness, confusion and/or seisures.  The treatment depends on the cause and severity of the injury.  In any case, while your kidneys are healing you should avoid agents that can cause kidney injury.  Some of these agents include NSAIDs, Gadolinium contrast, and Phosphate containing enemas.      Diagnosis: HTN (hypertension)  Assessment and Plan of Treatment: Low salt diet  Activity as tolerated.  Take all medication as prescribed.  Follow up with your medical doctor for routine blood pressure monitoring at your next visit.  Notify your doctor if you have any of the following symptoms:   Dizziness, Lightheadedness, Blurry vision, Headache, Chest pain, Shortness of breath      Diagnosis: Transaminitis  Assessment and Plan of Treatment: Followed for elevated liver enzyme, history of liver cirrhosis. Enzymes are trending down. continue to follow up with your doctor after discharge. Avoid hepatotoxic medications and alcohol consumption.    Diagnosis: Anemia of chronic disease  Assessment and Plan of Treatment: Symptoms to report, bleeding, palpitations, fatigue, pale skin, cold skin, dizziness. Take medications as ordered by PCP      Diagnosis: Cervical spinal stenosis  Assessment and Plan of Treatment: HX cervical spinal stenosis, underwent decompression 7/2021.   Concerning about lower leg weakness. PT consulted, recommends ROBINSON.   continue pain managment as prescribed.    Diagnosis: Acute blood loss anemia  Assessment and Plan of Treatment: YOu presented with blood loss due to blood in your stool. YOur blood count watched closely while you were here. it has been stable. Please follow up with your PCP and check your hemoglobin with your PCP.

## 2022-01-04 NOTE — PROGRESS NOTE ADULT - PROBLEM SELECTOR PLAN 6
h/o HTN on propranolol and lasix  Monitor BP  c/w home meds hx cervical spinal stenosis, s/p decompression 7/2021  family concerns worsening weakness lower leg for few weeks  requested ROBINSON placement  PT consulted  CM following.

## 2022-01-04 NOTE — DISCHARGE NOTE PROVIDER - CARE PROVIDER_API CALL
Neftaly Mckeon)  Gastroenterology; Internal Medicine  [address] [place] [id number]  Phone: [phone]  Fax: [phone]  Follow Up Time: 1 week   Neftaly Mckeon)  Gastroenterology; Internal Medicine  [address] [place] [address] [address]  [place] [address]  Phone: [phone]  Fax: [phone]  Follow Up Time: 1 week    Katlyn Cole)  Neurology  [address] [address] [place] [address]  Phone: [phone]  Fax: [phone]  Follow Up Time: Routine

## 2022-01-04 NOTE — PROGRESS NOTE ADULT - ATTENDING COMMENTS
No complaints except constipation.   Hb and Vitals stable, no s/s of acute bleeding. GI consult appreciated. No intervention currently as no bleeding and stable  Patient had covid exposure while in hospital - repeat COVID- Negative  patient unable to ambulate and family requesting SNF. -PT eval. CM consult. will have to isolate for 5 days prior to dc

## 2022-01-04 NOTE — PROGRESS NOTE ADULT - PROBLEM SELECTOR PLAN 4
Pt w/ SCr 1.6 on admission--> 1.18 today   Baseline SCr - 1  follow BMP daily takes propranolol 10, lasix 20, lactulose  c/w home meds

## 2022-01-04 NOTE — PROGRESS NOTE ADULT - PROBLEM SELECTOR PLAN 1
known history of GI bleed, no cactive bleeding   c/w protonix 40 po bid  Hb 11.4, baseline hb ~10-11  Monitor CBC q8  Advanced diet to clear  GI - Dr. Licea consulted-no role for EGD this time. rec to follow with Dr. Mckeon for further EHL and stone removal.   c/w PPI BID. present w/ melena  + FOBT  known history of GI bleed, no active bleeding   c/w protonix 40 po bid  Hb 12.0 baseline hb ~10-11  Monitor CBC    Advanced diet regular today  procal 2.2, , could be reactive, no s/s infection, will not repeat this time  CT shows large stones (see as above)  GI - Dr. Licea consulted-no role for EGD this time. rec to follow with Dr. Mcekon for further EHL and stone removal.   c/w PPI BID.

## 2022-01-04 NOTE — PROGRESS NOTE ADULT - PROBLEM SELECTOR PLAN 5
known history of anemia  likely acd  f/u anemia panel Pt w/ SCr 1.6 on admission--> 1.18 today   Baseline SCr - 1  follow BMP daily

## 2022-01-04 NOTE — DISCHARGE NOTE PROVIDER - HOSPITAL COURSE
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  In ED: no active bleeding, CT abd showed Cirrhosis. H/H stable.   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    Pt seen at bedside, NAD. Denies abdominal pain, rectal bleeding. No bowel movement since admission. no other concerns addressed.   Followed by GI, no role for EGD this time. rec to follow with Dr. Mckeon for further EHL and stone removal. c/w PPI BID.   Advanced diet to clear.   Pt was exposed to COVID 1/3, sent repeat COVID PCR.   contacted Dr. Mckeon office for an appointment ([phone]), which will call patient for dates.     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 Interval placement of biliary stent. Questionable cystic duct stent as well. Large gallstone reidentified.   Admitted for evaluation of GI bleeding and GI consulted. H/H stable.   Patient denies abdominal pain, rectal bleeding. No bowel movement since admission. started bowel regiemen.    Followed by GI, no role for EGD this time. rec to follow with Dr. Mckeon for further EHL and stone removal. c/w PPI BID.   Advanced diet to clear.   Pt was exposed to COVID 1/3, repeat COVID pcr negative 1/4, per Infection control, pt will need 5 days isolation once pt is exposed.   contacted Dr. Mckeon office for an appointment ([phone]), which will call patient for available dates.   Family concerns about pt worsening leg weakness past few weeks with hx of cervical spinal stenosis post decompression 7/2021. Wishes Rehab placement.  CM and PT consulted.       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 Interval placement of biliary stent. Questionable cystic duct stent as well. Large gallstone reidentified.   Admitted for evaluation of GI bleeding and GI consulted. FOBT + 1/2. H/H stable.   Patient denies abdominal pain, rectal bleeding. No bowel movement since admission. started bowel regimen.   Followed by GI,  no overt bleeding, no role for EGD this time. c/w PPI BID. Advanced diet to Regular. Pt with CBD stone, normal bili, rec to follow with Dr. Mckeon for further EHL and stone removal. Contacted Dr. Mckeon office for an appointment ([phone]), which will call patient for available dates.   Pt was exposed to COVID 1/3, repeat COVID pcr negative 1/4, per Infection control, pt will need 5 days isolation once pt is exposed.   Family concerns about pt worsening leg weakness past few weeks with hx of cervical spinal stenosis post decompression 7/2021. Wishes Rehab placement.  PT recommends [facility], per CM  pending acceptance.      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 Interval placement of biliary stent. Questionable cystic duct stent as well. Large gallstone reidentified.   Admitted for evaluation of GI bleeding and GI consulted. FOBT + 1/2. H/H stable.   Patient denies abdominal pain, rectal bleeding. No bowel movement since admission. started bowel regimen.   Followed by GI,  no overt bleeding, no role for EGD this time. c/w PPI BID. Advanced diet to Regular. Pt with CBD stone, normal bili, rec to follow with Dr. Mckeon for further EHL and stone removal. Contacted Dr. Mckeon office for an appointment ([phone]), which will call patient for available dates.   Pt was exposed to COVID 1/3, repeat COVID pcr negative 1/4, per Infection control, pt will need 5 days isolation once pt is exposed.   Family concerns about pt worsening leg weakness past few weeks with hx of cervical spinal stenosis post decompression 7/2021. Wishes [facility] placement.  PT recommends [facility], per CM  pending acceptance.

## 2022-01-04 NOTE — DISCHARGE NOTE PROVIDER - CARE PROVIDERS DIRECT ADDRESSES
[email or web address]

## 2022-01-04 NOTE — PROGRESS NOTE ADULT - PROBLEM SELECTOR PLAN 12
from home  pt exposed to COVID, PCR negative from 1/4 but pt needs 5 days isolation per infection control.   CM consulted for [facility] placement, PT consulted.

## 2022-01-04 NOTE — DISCHARGE NOTE PROVIDER - PROVIDER TOKENS
PROVIDER:[TOKEN:[[id number]],FOLLOWUP:[1 week]] PROVIDER:[TOKEN:[[id number]],FOLLOWUP:[1 week]],PROVIDER:[TOKEN:[[id number]],FOLLOWUP:[Routine]]

## 2022-01-04 NOTE — PROGRESS NOTE ADULT - ASSESSMENT
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  In ED: no active bleeding, CT abd showed Cirrhosis. H/H stable.   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    Pt seen at bedside, NAD. Denies abdominal pain, rectal bleeding. No bowel movement since admission. no other concerns addressed.   Followed by GI, no role for EGD this time. rec to follow with Dr. Mckeon for further EHL and stone removal. c/w PPI BID.   Advanced diet to clear.   Pt was exposed to COVID 1/3, sent repeat COVID PCR.      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  In ED: no active bleeding, CT abd showed Cirrhosis. H/H stable.   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    Pt seen at bedside, NAD. Denies abdominal pain, rectal bleeding. No bowel movement since admission. no other concerns addressed.   Followed by GI, no role for EGD this time. rec to follow with Dr. Mckeon for further EHL and stone removal. c/w PPI BID.   Advanced diet to clear.   Pt was exposed to COVID 1/3, repeat COVID pcr negative 1/4, per Infection control, pt will need 5 days isolation once pt is exposed.     Spoke to family daughter Allie Florez ([phone]) to update/discuss condition, family concerns about pt weakness lower leg worsening past few weeks, hx cervial stenosis and s/o decompression last year. prefers placement of Rehab facility.  CM following. PT consulted.

## 2022-01-04 NOTE — PROGRESS NOTE ADULT - PROBLEM SELECTOR PLAN 3
takes propranolol 10, lasix 20, lactulose  c/w home meds had plastic stent placed in 7/2021, needs replacement done by Dr. Neftaly Mckeon    AST/ALT 83/23  Bili wnl  known gall bladder disease  CT shows + large stones   GI Dr. Licea following, no role for EGD this time. rec to follow with Dr. Mckeon for further EHL and stone removal.  called office dr. Mike Higginbotham [phone], office will call for an appointment

## 2022-01-04 NOTE — DISCHARGE NOTE PROVIDER - NSDCMRMEDTOKEN_GEN_ALL_CORE_FT
cyanocobalamin 1000 mcg oral tablet: 1 tab(s) orally once a day  furosemide 20 mg oral tablet: 1 tab(s) orally once a day  lactulose 10 g/15 mL oral syrup: 15 milliliter(s) orally 2 times a day -3 BMs daily  Multiple Vitamins oral capsule: 1 cap(s) orally once a day, last dose 6/15/21  pantoprazole 40 mg oral delayed release tablet: 1 tab(s) orally once a day (before a meal)  propranolol 10 mg oral tablet: 1 tab(s) orally once a day  thiamine 100 mg oral tablet: 1 tab(s) orally once a day   acetaminophen 325 mg oral tablet: 2 tab(s) orally every 6 hours, As needed, Mild Pain (1 - 3), Moderate Pain (4 - 6), Severe Pain (7 - 10)  cyanocobalamin 1000 mcg oral tablet: 1 tab(s) orally once a day  furosemide 20 mg oral tablet: 1 tab(s) orally once a day  lactulose 10 g/15 mL oral syrup: 15 milliliter(s) orally 2 times a day -3 BMs daily  Multiple Vitamins oral capsule: 1 cap(s) orally once a day, last dose 6/15/21  pantoprazole 40 mg oral delayed release tablet: 1 tab(s) orally once a day (before a meal)  polyethylene glycol 3350 oral powder for reconstitution: 17 gram(s) orally once a day  propranolol 10 mg oral tablet: 1 tab(s) orally once a day  thiamine 100 mg oral tablet: 1 tab(s) orally once a day

## 2022-01-04 NOTE — CHART NOTE - NSCHARTNOTEFT_GEN_A_CORE
EVENT:   1/3/22, 11:12pm, patient with Hx. liver cirrhosis (ETOH), c/o generalized body ache. He requested Tylenol.   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 Pt with nausea and generalized abdominal pain 1/1 which was relieved by defecating. Pt reported constipation. Pt was a poor historian. History was taken from wife -[id number]. (02 Jan 2022 21:54)    OBJECTIVE:  Vital Signs Last 24 Hrs  T(C): 36.8 (03 Jan 2022 22:11), Max: 36.8 (03 Jan 2022 22:11)  T(F): 98.2 (03 Jan 2022 22:11), Max: 98.2 (03 Jan 2022 22:11)  HR: 64 (03 Jan 2022 22:11) (59 - 77)  BP: 124/71 (03 Jan 2022 22:11) (99/63 - 124/71)  BP(mean): --  RR: 18 (03 Jan 2022 22:11) (18 - 18)  SpO2: 98% (03 Jan 2022 22:11) (98% - 100%)    FOCUSED PHYSICAL EXAM:    LABS:                        12.7   5.57  )-----------( 168      ( 03 Jan 2022 18:41 )             39.2     01-03    137  |  105  |  19<H>  ----------------------------<  84  3.9   |  24  |  1.19    Ca    8.4      03 Jan 2022 06:13  Phos  2.0     01-03  Mg     1.7     01-03    TPro  5.9<L>  /  Alb  2.2<L>  /  TBili  1.1  /  DBili  x   /  AST  54<H>  /  ALT  19  /  AlkPhos  223<H>  01    PLAN:   - Acetaminophen (Tylenol) 650 mg po x1 dose for pain.     FOLLOW UP / RESULT:   - Reassess patient pain level,   - Safety measures maintain.

## 2022-01-05 LAB — UUN UR-MCNC: 85 MG/DL — SIGNIFICANT CHANGE UP

## 2022-01-05 PROCEDURE — 99232 SBSQ HOSP IP/OBS MODERATE 35: CPT

## 2022-01-05 RX ORDER — POLYETHYLENE GLYCOL 3350 17 G/17G
17 POWDER, FOR SOLUTION ORAL DAILY
Refills: 0 | Status: DISCONTINUED | OUTPATIENT
Start: 2022-01-05 | End: 2022-01-07

## 2022-01-05 RX ADMIN — Medication 20 MILLIGRAM(S): at 06:13

## 2022-01-05 RX ADMIN — LACTULOSE 15 GRAM(S): 10 SOLUTION ORAL at 17:50

## 2022-01-05 RX ADMIN — PANTOPRAZOLE SODIUM 40 MILLIGRAM(S): 20 TABLET, DELAYED RELEASE ORAL at 06:13

## 2022-01-05 RX ADMIN — PREGABALIN 1000 MICROGRAM(S): 225 CAPSULE ORAL at 11:45

## 2022-01-05 RX ADMIN — Medication 100 MILLIGRAM(S): at 11:45

## 2022-01-05 RX ADMIN — LACTULOSE 15 GRAM(S): 10 SOLUTION ORAL at 06:13

## 2022-01-05 RX ADMIN — PANTOPRAZOLE SODIUM 40 MILLIGRAM(S): 20 TABLET, DELAYED RELEASE ORAL at 17:50

## 2022-01-05 RX ADMIN — POLYETHYLENE GLYCOL 3350 17 GRAM(S): 17 POWDER, FOR SOLUTION ORAL at 17:45

## 2022-01-05 RX ADMIN — Medication 1 TABLET(S): at 11:45

## 2022-01-05 RX ADMIN — SODIUM CHLORIDE 100 MILLILITER(S): 9 INJECTION INTRAMUSCULAR; INTRAVENOUS; SUBCUTANEOUS at 03:43

## 2022-01-05 NOTE — PROGRESS NOTE ADULT - PROBLEM SELECTOR PLAN 1
present w/ melena  + FOBT  known history of GI bleed, no active bleeding   c/w protonix 40 po bid  Hb 12.0 baseline hb ~10-11  Monitor CBC    Advanced diet regular today  procal 2.2, , could be reactive, no s/s infection, will not repeat this time  CT shows large stones (see as above)  GI - Dr. Licea consulted-no role for EGD this time. rec to follow with Dr. Mckeon for further EHL and stone removal.   c/w PPI BID.

## 2022-01-05 NOTE — PHYSICAL THERAPY INITIAL EVALUATION ADULT - ADDITIONAL COMMENTS
Pt states he was able to go from hospital bed to wheelchair with assist. and better able to move around in bed.   Family unable to care for pt

## 2022-01-05 NOTE — PHYSICAL THERAPY INITIAL EVALUATION ADULT - BED MOBILITY LIMITATIONS, REHAB EVAL
(0) independent decreased ability to use arms for pushing/pulling/decreased ability to use legs for bridging/pushing/impaired ability to control trunk for mobility

## 2022-01-05 NOTE — PROGRESS NOTE ADULT - ASSESSMENT
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  In ED: no active bleeding, CT abd showed Cirrhosis. H/H stable.   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    1/5- Pt remained stable w/o cardiopulmonary symptoms at time of my eval. Per chart review, pt was exposed to COVID 1/3, repeat COVID pcr negative 1/4, per Infection control, pt will need 5 days isolation once pt is exposed. Followed by GI, no role for EGD this time. rec to follow with Dr. Mckeon for further EHL and stone removal. Pt was evaluated by PT and recs [facility].

## 2022-01-05 NOTE — PHYSICAL THERAPY INITIAL EVALUATION ADULT - PLANNED THERAPY INTERVENTIONS, PT EVAL
balance training/bed mobility training/gait training/neuromuscular re-education/postural re-education/ROM/strengthening/transfer training/wheelchair management/propulsion training

## 2022-01-05 NOTE — PROGRESS NOTE ADULT - PROBLEM SELECTOR PLAN 6
hx cervical spinal stenosis, s/p decompression 7/2021  family concerns worsening weakness lower leg for few weeks  requested [facility] placement  PT consulted  CM following.

## 2022-01-05 NOTE — PROGRESS NOTE ADULT - PROBLEM SELECTOR PLAN 3
had plastic stent placed in 7/2021, needs replacement done by Dr. Neftaly Mckeon    AST/ALT 83/23  Bili wnl  known gall bladder disease  CT shows + large stones   GI Dr. Licea following, no role for EGD this time. rec to follow with Dr. Mckeon for further EHL and stone removal.  called office dr. Mike Higginbotham [phone], office will call for an appointment

## 2022-01-05 NOTE — PHYSICAL THERAPY INITIAL EVALUATION ADULT - ACTIVE RANGE OF MOTION EXAMINATION, REHAB EVAL
except shoulders ~3/4 range and hips ~2/3 range/bilateral upper extremity Active ROM was WFL (within functional limits)/bilateral  lower extremity Active ROM was WFL (within functional limits)

## 2022-01-05 NOTE — PROGRESS NOTE ADULT - ATTENDING COMMENTS
c.o constipation- small BM, cw lactulose , add miralax  Hb and vitals stable, snf placement, given exposure in hospital awaiting safe dc to SNF  fu GI recs c.o constipation- small BM, cw lactulose , add miralax  Hb and vitals stable, snf placement, given exposure in hospital awaiting safe dc to SNF  [facility] GI recs  change PPI iv to PO tomorrow

## 2022-01-05 NOTE — PROGRESS NOTE ADULT - PROBLEM SELECTOR PLAN 10
exposed to COVID pt on 1/3  repeat PCR negative 1/4  Pt is vaccinated  per infection control, will need 5 days isolation.

## 2022-01-05 NOTE — PHYSICAL THERAPY INITIAL EVALUATION ADULT - MANUAL MUSCLE TESTING RESULTS, REHAB EVAL
BUE 4-/5, except shoulders 3-/5; hips 3-/5, knees 4/5. Ankels at least 3-/5 functionally. Posture grossly 3-/5

## 2022-01-06 ENCOUNTER — TRANSCRIPTION ENCOUNTER (OUTPATIENT)
Age: 82
End: 2022-01-06

## 2022-01-06 LAB
ALBUMIN SERPL ELPH-MCNC: 2 G/DL — LOW (ref 3.5–5)
ALP SERPL-CCNC: 218 U/L — HIGH (ref 40–120)
ALT FLD-CCNC: 18 U/L DA — SIGNIFICANT CHANGE UP (ref 10–60)
ANION GAP SERPL CALC-SCNC: 6 MMOL/L — SIGNIFICANT CHANGE UP (ref 5–17)
AST SERPL-CCNC: 27 U/L — SIGNIFICANT CHANGE UP (ref 10–40)
BILIRUB SERPL-MCNC: 0.5 MG/DL — SIGNIFICANT CHANGE UP (ref 0.2–1.2)
BUN SERPL-MCNC: 10 MG/DL — SIGNIFICANT CHANGE UP (ref 7–18)
CALCIUM SERPL-MCNC: 8.7 MG/DL — SIGNIFICANT CHANGE UP (ref 8.4–10.5)
CHLORIDE SERPL-SCNC: 107 MMOL/L — SIGNIFICANT CHANGE UP (ref 96–108)
CO2 SERPL-SCNC: 26 MMOL/L — SIGNIFICANT CHANGE UP (ref 22–31)
CREAT SERPL-MCNC: 0.97 MG/DL — SIGNIFICANT CHANGE UP (ref 0.5–1.3)
GLUCOSE SERPL-MCNC: 92 MG/DL — SIGNIFICANT CHANGE UP (ref 70–99)
HCT VFR BLD CALC: 35.1 % — LOW (ref 39–50)
HGB BLD-MCNC: 11.6 G/DL — LOW (ref 13–17)
MCHC RBC-ENTMCNC: 24.9 PG — LOW (ref 27–34)
MCHC RBC-ENTMCNC: 33 GM/DL — SIGNIFICANT CHANGE UP (ref 32–36)
MCV RBC AUTO: 75.3 FL — LOW (ref 80–100)
NRBC # BLD: 0 /100 WBCS — SIGNIFICANT CHANGE UP (ref 0–0)
PLATELET # BLD AUTO: 167 K/UL — SIGNIFICANT CHANGE UP (ref 150–400)
POTASSIUM SERPL-MCNC: 3.8 MMOL/L — SIGNIFICANT CHANGE UP (ref 3.5–5.3)
POTASSIUM SERPL-SCNC: 3.8 MMOL/L — SIGNIFICANT CHANGE UP (ref 3.5–5.3)
PROT SERPL-MCNC: 5.8 G/DL — LOW (ref 6–8.3)
RBC # BLD: 4.66 M/UL — SIGNIFICANT CHANGE UP (ref 4.2–5.8)
RBC # FLD: 20.3 % — HIGH (ref 10.3–14.5)
SODIUM SERPL-SCNC: 139 MMOL/L — SIGNIFICANT CHANGE UP (ref 135–145)
WBC # BLD: 5.89 K/UL — SIGNIFICANT CHANGE UP (ref 3.8–10.5)
WBC # FLD AUTO: 5.89 K/UL — SIGNIFICANT CHANGE UP (ref 3.8–10.5)

## 2022-01-06 PROCEDURE — 99239 HOSP IP/OBS DSCHRG MGMT >30: CPT

## 2022-01-06 RX ORDER — PANTOPRAZOLE SODIUM 20 MG/1
40 TABLET, DELAYED RELEASE ORAL EVERY 12 HOURS
Refills: 0 | Status: DISCONTINUED | OUTPATIENT
Start: 2022-01-06 | End: 2022-01-07

## 2022-01-06 RX ORDER — ACETAMINOPHEN 500 MG
2 TABLET ORAL
Qty: 0 | Refills: 0 | DISCHARGE
Start: 2022-01-06

## 2022-01-06 RX ORDER — ACETAMINOPHEN 500 MG
650 TABLET ORAL EVERY 6 HOURS
Refills: 0 | Status: DISCONTINUED | OUTPATIENT
Start: 2022-01-06 | End: 2022-01-07

## 2022-01-06 RX ORDER — POLYETHYLENE GLYCOL 3350 17 G/17G
17 POWDER, FOR SOLUTION ORAL
Qty: 0 | Refills: 0 | DISCHARGE
Start: 2022-01-06

## 2022-01-06 RX ADMIN — Medication 100 MILLIGRAM(S): at 11:57

## 2022-01-06 RX ADMIN — Medication 20 MILLIGRAM(S): at 05:30

## 2022-01-06 RX ADMIN — PANTOPRAZOLE SODIUM 40 MILLIGRAM(S): 20 TABLET, DELAYED RELEASE ORAL at 05:30

## 2022-01-06 RX ADMIN — PREGABALIN 1000 MICROGRAM(S): 225 CAPSULE ORAL at 11:52

## 2022-01-06 RX ADMIN — Medication 1 TABLET(S): at 11:52

## 2022-01-06 RX ADMIN — PANTOPRAZOLE SODIUM 40 MILLIGRAM(S): 20 TABLET, DELAYED RELEASE ORAL at 17:32

## 2022-01-06 RX ADMIN — LACTULOSE 15 GRAM(S): 10 SOLUTION ORAL at 05:29

## 2022-01-06 RX ADMIN — Medication 650 MILLIGRAM(S): at 06:13

## 2022-01-06 RX ADMIN — LACTULOSE 15 GRAM(S): 10 SOLUTION ORAL at 17:31

## 2022-01-06 RX ADMIN — Medication 650 MILLIGRAM(S): at 07:00

## 2022-01-06 NOTE — PROGRESS NOTE ADULT - ASSESSMENT
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 CT abd showed Cirrhosis. H/H stable. NO active bleeding. 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 GALLBLADDER: 3.4 x 2.2 cm gallstone. Punctate calcification in the   gallbladder wall.  Admitted for evaluation of GI bleeding and GI consulted. Gi Dr Licea following. no intervention at this time. can follow up OP with Dr Dr. Mckeon.    1/5- Pt remained stable w/o cardiopulmonary symptoms at time of my eval. Per chart review, pt was exposed to COVID 1/3, repeat COVID pcr negative 1/4, per Infection control, pt will need 5 days isolation once pt is exposed. Followed by GI, no role for EGD this time. rec to follow with Dr. Mckeon for further EHL and stone removal. Pt was evaluated by PT and recs [facility].   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 CT abd showed Cirrhosis. H/H stable. NO active bleeding. 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 GALLBLADDER: 3.4 x 2.2 cm gallstone. Punctate calcification in the   gallbladder wall. no intervention from GI. Admitted for evaluation of GI bleeding and GI consulted. Can follow up OP with Dr Dr. Mckeon.Gi Dr Licea following.    1/5- Pt remained stable w/o cardiopulmonary symptoms at time of my eval. Per chart review, pt was exposed to COVID 1/3, repeat COVID pcr negative 1/4, per Infection control, pt will need 5 days isolation once pt is exposed. Followed by GI, no role for EGD this time. rec to follow with Dr. Mckeon for further EHL and stone removal. Pt was evaluated by PT and recs [facility].

## 2022-01-06 NOTE — DISCHARGE NOTE NURSING/CASE MANAGEMENT/SOCIAL WORK - NSDCPEFALRISK_GEN_ALL_CORE
For information on Fall & Injury Prevention, visit: [email or web address] OR  [email or web address] OR  https://www.cdc.gov/steadi/patient.html

## 2022-01-06 NOTE — DISCHARGE NOTE NURSING/CASE MANAGEMENT/SOCIAL WORK - PATIENT PORTAL LINK FT
You can access the FollowMyHealth Patient Portal offered by [facility] by registering at the following website: [email or web address]. By joining [facility]’s FollowMyHealth portal, you will also be able to view your health information using other applications (apps) compatible with our system.

## 2022-01-06 NOTE — PROGRESS NOTE ADULT - PROBLEM SELECTOR PLAN 12
from home  pt exposed to COVID, PCR negative from 1/4 but pt needs 5 days isolation per infection control.   CM consulted for [facility] placement, PT consulted. from home  pt exposed to COVID, PCR negative from 1/4 but pt needs 5 days isolation per infection control.   CM consulted for [facility]avia placement, PT consulted.  D/c to [facility] today

## 2022-01-06 NOTE — PROGRESS NOTE ADULT - PROBLEM SELECTOR PLAN 3
had plastic stent placed in 7/2021, needs replacement done by Dr. Neftaly Mckeon    AST/ALT 83/23  Bili wnl  known gall bladder disease  CT shows + large stones   GI Dr. Licea following, no role for EGD this time. rec to follow with Dr. Mckeon for further EHL and stone removal.  called office dr. Mike Higginbotham [phone], office will call for an appointment

## 2022-01-07 VITALS
OXYGEN SATURATION: 100 % | DIASTOLIC BLOOD PRESSURE: 69 MMHG | SYSTOLIC BLOOD PRESSURE: 110 MMHG | HEART RATE: 69 BPM | TEMPERATURE: 97 F | RESPIRATION RATE: 18 BRPM

## 2022-01-07 PROCEDURE — 97162 PT EVAL MOD COMPLEX 30 MIN: CPT

## 2022-01-07 PROCEDURE — 86901 BLOOD TYPING SEROLOGIC RH(D): CPT

## 2022-01-07 PROCEDURE — 84540 ASSAY OF URINE/UREA-N: CPT

## 2022-01-07 PROCEDURE — 80048 BASIC METABOLIC PNL TOTAL CA: CPT

## 2022-01-07 PROCEDURE — 86850 RBC ANTIBODY SCREEN: CPT

## 2022-01-07 PROCEDURE — 36415 COLL VENOUS BLD VENIPUNCTURE: CPT

## 2022-01-07 PROCEDURE — 71045 X-RAY EXAM CHEST 1 VIEW: CPT

## 2022-01-07 PROCEDURE — 86140 C-REACTIVE PROTEIN: CPT

## 2022-01-07 PROCEDURE — 84443 ASSAY THYROID STIM HORMONE: CPT

## 2022-01-07 PROCEDURE — 80061 LIPID PANEL: CPT

## 2022-01-07 PROCEDURE — 84100 ASSAY OF PHOSPHORUS: CPT

## 2022-01-07 PROCEDURE — 85027 COMPLETE CBC AUTOMATED: CPT

## 2022-01-07 PROCEDURE — 83735 ASSAY OF MAGNESIUM: CPT

## 2022-01-07 PROCEDURE — 85025 COMPLETE CBC W/AUTO DIFF WBC: CPT

## 2022-01-07 PROCEDURE — 82272 OCCULT BLD FECES 1-3 TESTS: CPT

## 2022-01-07 PROCEDURE — 83036 HEMOGLOBIN GLYCOSYLATED A1C: CPT

## 2022-01-07 PROCEDURE — 87635 SARS-COV-2 COVID-19 AMP PRB: CPT

## 2022-01-07 PROCEDURE — 99285 EMERGENCY DEPT VISIT HI MDM: CPT

## 2022-01-07 PROCEDURE — 85730 THROMBOPLASTIN TIME PARTIAL: CPT

## 2022-01-07 PROCEDURE — 80053 COMPREHEN METABOLIC PANEL: CPT

## 2022-01-07 PROCEDURE — 82570 ASSAY OF URINE CREATININE: CPT

## 2022-01-07 PROCEDURE — 84145 PROCALCITONIN (PCT): CPT

## 2022-01-07 PROCEDURE — 84300 ASSAY OF URINE SODIUM: CPT

## 2022-01-07 PROCEDURE — 74176 CT ABD & PELVIS W/O CONTRAST: CPT

## 2022-01-07 PROCEDURE — 99231 SBSQ HOSP IP/OBS SF/LOW 25: CPT

## 2022-01-07 PROCEDURE — 85379 FIBRIN DEGRADATION QUANT: CPT

## 2022-01-07 PROCEDURE — 85610 PROTHROMBIN TIME: CPT

## 2022-01-07 PROCEDURE — 86900 BLOOD TYPING SEROLOGIC ABO: CPT

## 2022-01-07 RX ORDER — GUAIFENESIN/DEXTROMETHORPHAN 600MG-30MG
5 TABLET, EXTENDED RELEASE 12 HR ORAL ONCE
Refills: 0 | Status: COMPLETED | OUTPATIENT
Start: 2022-01-07 | End: 2022-01-07

## 2022-01-07 RX ADMIN — LACTULOSE 15 GRAM(S): 10 SOLUTION ORAL at 05:36

## 2022-01-07 RX ADMIN — PANTOPRAZOLE SODIUM 40 MILLIGRAM(S): 20 TABLET, DELAYED RELEASE ORAL at 05:37

## 2022-01-07 RX ADMIN — Medication 5 MILLILITER(S): at 05:38

## 2022-01-07 RX ADMIN — Medication 650 MILLIGRAM(S): at 05:42

## 2022-01-07 NOTE — PROGRESS NOTE ADULT - PROBLEM SELECTOR PROBLEM 11
Counseling regarding goals of care

## 2022-01-07 NOTE — PROGRESS NOTE ADULT - ASSESSMENT
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 CT abd showed Cirrhosis. H/H stable. NO active bleeding. 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 GALLBLADDER: 3.4 x 2.2 cm gallstone. Punctate calcification in the   gallbladder wall. no intervention from GI. Admitted for evaluation of GI bleeding and GI consulted. Can follow up OP with Dr Dr. Mckeon.Gi Dr Licea following.    1/5- Pt remained stable w/o cardiopulmonary symptoms at time of my eval. Per chart review, pt was exposed to COVID 1/3, repeat COVID pcr negative 1/4, per Infection control, pt will need 5 days isolation once pt is exposed. Followed by GI, no role for EGD this time. rec to follow with Dr. Mckeon for further EHL and stone removal. Pt was evaluated by PT and recs [facility].    1/7-  Pt evaluated at bedside, clinically stable, awaiting transportation to [facility].  Pt is eager to be d/c to next level of care.  Endorsed no symptoms.

## 2022-01-07 NOTE — PROGRESS NOTE ADULT - SUBJECTIVE AND OBJECTIVE BOX
Subjective:  No pain  No BM       ___________________________________________________________________________________________  Allergies    No Known Allergies    Intolerances      MEDICATIONS  (STANDING):  cyanocobalamin 1000 MICROGram(s) Oral daily  furosemide    Tablet 20 milliGRAM(s) Oral daily  lactulose Syrup 15 Gram(s) Oral two times a day  multivitamin 1 Tablet(s) Oral daily  pantoprazole  Injectable 40 milliGRAM(s) IV Push every 12 hours  propranolol 10 milliGRAM(s) Oral daily  sodium chloride 0.9%. 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)    Pancreatitis  2/2 choledocholithiasis s/p ERCP with stone extraction and plastic stent placement (11/2019, stent now removed)    Hematochezia  2/2 colonic AVMs s/p cauterization and hemorrhoids (11/2019)    Melena  2/2 duodenal ulcers s/p argon plasma coagulation (4/2020)    Chronic kidney disease (CKD)    H/O inguinal hernia repair    History of repair of hip fracture  R hip    History of hip replacement  10/2020      FAMILY HISTORY:  Family history of ovarian cancer (Sibling)      Social History: No hsitory of : Tobacco use, IVDA, EToH  ______________________________________________________________________________________    PHYSICAL EXAM    Daily     Daily   BMI: 32 (01-02 @ 16:35)  Change in Weight:  Vital Signs Last 24 Hrs  T(C): 36.3 (04 Jan 2022 05:27), Max: 36.8 (03 Jan 2022 22:11)  T(F): 97.4 (04 Jan 2022 05:27), Max: 98.2 (03 Jan 2022 22:11)  HR: 57 (04 Jan 2022 05:27) (57 - 64)  BP: 117/63 (04 Jan 2022 05:27) (107/62 - 124/71)  BP(mean): 76 (04 Jan 2022 05:27) (76 - 76)  RR: 18 (04 Jan 2022 05:27) (18 - 18)  SpO2: 100% (04 Jan 2022 05:27) (98% - 100%)    General:  Well developed, well nourished, alert and active, no pallor, NAD.  HEENT:    Normal appearance of conjunctiva, ears, nose, lips, oropharynx, and oral mucosa, anicteric.  Neck:  No masses, no asymmetry.  Lymph Nodes:  No lymphadenopathy.   Cardiovascular:  RRR normal S1/S2, no murmur.  Respiratory:  CTA B/L, normal respiratory effort.   Abdominal:   soft, no masses or tenderness, normoactive BS, NT/ND, no HSM.  Extremities:   No clubbing or cyanosis, normal capillary refill, no edema.   Skin:   No rash, jaundice, lesions, eczema.   Musculoskeletal:  No joint swelling, erythema or tenderness.   Neuro: No focal deficits.   Other:   _______________________________________________________________________________________________  Lab Results:                          12.7   5.57  )-----------( 168      ( 03 Jan 2022 18:41 )             39.2     01-03    137  |  105  |  19<H>  ----------------------------<  84  3.9   |  24  |  1.19    Ca    8.4      03 Jan 2022 06:13  Phos  2.0     01-03  Mg     1.7     01-03    TPro  5.9<L>  /  Alb  2.2<L>  /  TBili  1.1  /  DBili  x   /  AST  54<H>  /  ALT  19  /  AlkPhos  223<H>  01-03    LIVER FUNCTIONS - ( 03 Jan 2022 06:13 )  Alb: 2.2 g/dL / Pro: 5.9 g/dL / ALK PHOS: 223 U/L / ALT: 19 U/L DA / AST: 54 U/L / GGT: x           PT/INR - ( 03 Jan 2022 06:13 )   PT: 19.4 sec;   INR: 1.67 ratio         PTT - ( 03 Jan 2022 06:13 )  PTT:30.4 sec  C-Reactive Protein, Serum: 129 mg/L (01-03 @ 17:39)        Stool Results:          RADIOLOGY RESULTS:  < from: ERCP (06.22.21 @ 08:16) >    [facility]  _______________________________________________________________________________  Patient Name: Anthony Florez            Procedure Date: 6/22/2021 8:16 AM  MRN: [id number]                     Account Number: [id number]  YOB: 1940              Admit Type: Outpatient  Room: ENDO 01                         Gender: Male  Attending MD: AUGUSTINA GARRETT MD      _______________________________________________________________________________     Procedure:           ERCP  Indications:         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 ERCP scheduled for                        stent removal and therapy.  Providers:           AUGUSTINA GARRETT MD  Medicines:           General Anesthesia                       Fluoro: 3.7 min/39.8 mgy/77 kV                       IVF                       Cipro 400 mg IV  Complications:       No immediate complications.  Procedure:           Pre-Anesthesia Assessment:                       - Prior to the procedure, a History and Physical was                        performed, and patient medications, allergies and                        sensitivities were reviewed. The patient's tolerance of                      previous anesthesia was reviewed.                       After obtaining informed consent, the scope was passed                        under direct vision. Throughout the procedure, the                        patient's blood pressure, pulse, and oxygen saturations                        were monitored continuously. The ERCP was introduced                        through the mouth, and advanced to the duodenum and used                        to inject contrast into the bile duct. The ERCP was                        accomplished with ease. The patient tolerated the                        procedure well.                                                                                   Findings:       EGD:       -The esophagus was normal.       -The stomach was normal.       -The duodenal bulb was normal. D2 contained a stent.       ERCP:       -The duodenoscope was advanced to the ampulla.       -The prior placed stent was seen with biliary sphincterotomy.       -The stent was removed with a snare.       -The bile duct was cannulated using a preloaded Jag revolution        sphincterotome.       -Contrast was injected. I acquired and interpreted the images. There was        a GB stone protruding into the CBD causing biliary obstruction.       -Digital cholangioscopy was performed.       -The stone was visualized and the EHL performed fragmenting the stone.       -A balloon catheter was sued to sweep the stone fragments.       -Repeat cholangiogram showed decreased stone burden in the CBD.       -A 10Fr-7 cm stent was placed. There was good flow of bile.                                                                                   Impression:          - Endoscopic retrograde cholangiopancreatography with                   stent exchange, cholangioscopy with EHL, and stone                        removal.  Recommendation:      - Discharge patient to home (ambulatory).                       - Repeat ERCP in 3-4 months for stent removal and          reevaluation.                                                                                   Attending Participation:       I personally performed the entire procedure.              ___________________  AUGUSTINA GARRETT MD  6/22/2021 10:31:47 AM  This report has been signed electronically.  Number of Addenda: 0    Note Initiated On: 6/22/2021 8:16 AM    < end of copied text >  < from: ERCP (06.22.21 @ 08:16) >    [facility]  _______________________________________________________________________________  Patient Name: Anthony Florez            Procedure Date: 6/22/2021 8:16 AM  MRN: [id number]                     Account Number: [id number]  YOB: 1940              Admit Type: Outpatient  Room: [place] [address]                         Gender: Male  Attending MD: AUGUSTINA GARRETT MD      _______________________________________________________________________________     Procedure:           ERCP  Indications:         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 ERCP scheduled for                        stent removal and therapy.  Providers:           AUGUSTINA GARRETT MD  Medicines:           General Anesthesia                       Fluoro: 3.7 min/39.8 mgy/77 kV                       IVF                       Cipro 400 mg IV  Complications:       No immediate complications.  Procedure:           Pre-Anesthesia Assessment:                       - Prior to the procedure, a History and Physical was                        performed, and patient medications, allergies and                        sensitivities were reviewed. The patient's tolerance of                      previous anesthesia was reviewed.                       After obtaining informed consent, the scope was passed                        under direct vision. Throughout the procedure, the                        patient's blood pressure, pulse, and oxygen saturations                        were monitored continuously. The ERCP was introduced                        through the mouth, and advanced to the duodenum and used                        to inject contrast into the bile duct. The ERCP was                        accomplished with ease. The patient tolerated the                        procedure well.                                                                                   Findings:       EGD:       -The esophagus was normal.       -The stomach was normal.       -The duodenal bulb was normal. D2 contained a stent.       ERCP:       -The duodenoscope was advanced to the ampulla.       -The prior placed stent was seen with biliary sphincterotomy.       -The stent was removed with a snare.       -The bile duct was cannulated using a preloaded Jag revolution        sphincterotome.       -Contrast was injected. I acquired and interpreted the images. There was        a GB stone protruding into the CBD causing biliary obstruction.       -Digital cholangioscopy was performed.       -The stone was visualized and the EHL performed fragmenting the stone.       -A balloon catheter was sued to sweep the stone fragments.       -Repeat cholangiogram showed decreased stone burden in the CBD.       -A 10Fr-7 cm stent was placed. There was good flow of bile.                                                                                   Impression:          - Endoscopic retrograde cholangiopancreatography with                   stent exchange, cholangioscopy with EHL, and stone                        removal.  Recommendation:      - Discharge patient to home (ambulatory).                       - Repeat ERCP in 3-4 months for stent removal and          reevaluation.                                                                                   Attending Participation:       I personally performed the entire procedure.              ___________________  AUGUSTINA GARRETT MD  6/22/2021 10:31:47 AM  This report has been signed electronically.  Number of Addenda: 0    Note Initiated On: 6/22/2021 8:16 AM    < end of copied text >    SURGICAL PATHOLOGY:     
NP Note discussed with  Primary Attending    INTERVAL HPI/OVERNIGHT EVENTS: no new complaints    MEDICATIONS  (STANDING):  cyanocobalamin 1000 MICROGram(s) Oral daily  furosemide    Tablet 20 milliGRAM(s) Oral daily  lactulose Syrup 15 Gram(s) Oral two times a day  multivitamin 1 Tablet(s) Oral daily  pantoprazole  Injectable 40 milliGRAM(s) IV Push every 12 hours  propranolol 10 milliGRAM(s) Oral daily  sodium chloride 0.9%. 1000 milliLiter(s) (100 mL/Hr) IV Continuous <Continuous>  thiamine 100 milliGRAM(s) Oral daily    MEDICATIONS  (PRN):      __________________________________________________  REVIEW OF SYSTEMS:    CONSTITUTIONAL: No fever,   EYES: no acute visual disturbances  NECK: No pain or stiffness  RESPIRATORY: No cough; No shortness of breath  CARDIOVASCULAR: No chest pain, no palpitations  GASTROINTESTINAL: No pain. No nausea or vomiting; No diarrhea   NEUROLOGICAL: No headache or numbness, no tremors  MUSCULOSKELETAL: No joint pain, no muscle pain  GENITOURINARY: no dysuria, no frequency, no hesitancy  PSYCHIATRY: no depression , no anxiety  ALL OTHER  ROS negative        Vital Signs Last 24 Hrs  T(C): 36.3 (04 Jan 2022 05:27), Max: 36.8 (03 Jan 2022 22:11)  T(F): 97.4 (04 Jan 2022 05:27), Max: 98.2 (03 Jan 2022 22:11)  HR: 57 (04 Jan 2022 05:27) (57 - 64)  BP: 117/63 (04 Jan 2022 05:27) (107/62 - 124/71)  BP(mean): 76 (04 Jan 2022 05:27) (76 - 76)  RR: 18 (04 Jan 2022 05:27) (18 - 18)  SpO2: 100% (04 Jan 2022 05:27) (98% - 100%)    ________________________________________________  PHYSICAL EXAM:  GENERAL: NAD, poor historian  HEENT: Normocephalic;  conjunctivae and sclerae clear; moist mucous membranes;   NECK : supple  CHEST/LUNG: Clear to auscultation bilaterally with fair air entry   HEART: S1 S2  regular; no murmurs, gallops or rubs  ABDOMEN: Soft, Nontender, Nondistended; Bowel sounds present  EXTREMITIES: no cyanosis; no edema; no calf tenderness  SKIN: warm and dry; no rash  NERVOUS SYSTEM:  Awake and alert; Oriented  to self and place ; no new deficits    _________________________________________________  LABS:                        12.0   3.96  )-----------( 145      ( 04 Jan 2022 08:05 )             36.3     01-04    140  |  105  |  15  ----------------------------<  94  4.6   |  29  |  1.18    Ca    8.7      04 Jan 2022 08:05  Phos  2.0     01-03  Mg     1.7     01-03    TPro  5.9<L>  /  Alb  2.2<L>  /  TBili  1.1  /  DBili  x   /  AST  54<H>  /  ALT  19  /  AlkPhos  223<H>  01-03    PT/INR - ( 03 Jan 2022 06:13 )   PT: 19.4 sec;   INR: 1.67 ratio         PTT - ( 03 Jan 2022 06:13 )  PTT:30.4 sec    CAPILLARY BLOOD GLUCOSE            RADIOLOGY & ADDITIONAL TESTS:    Imaging  Reviewed:  YES  < from: CT Abdomen and Pelvis No Cont (01.03.22 @ 07:40) >    ACC: [id number] EXAM:  CT ABDOMEN AND PELVIS                          PROCEDURE DATE:  01/03/2022          INTERPRETATION:  CLINICAL INFORMATION: 81 years  Male with melena.    COMPARISON: Contrast-enhanced CT 2/6/2021    CONTRAST/COMPLICATIONS:  IV Contrast: Omnipaque 350  90 cc administered   10 cc discarded  Oral Contrast: NONE  Complications: None reported at time of study completion    PROCEDURE:  CT of the Abdomen and Pelvis was performed.  Sagittal and coronal reformats were performed.    LIMITATIONS: Evaluation of the solid organs, vascular structures and GI   tract is limited without oral and IV contrast.    FINDINGS:  LOWER CHEST: Coronary artery calcifications.    LIVER: Shrunken liver with nodular contour.  BILE DUCTS: Biliary stent in the common duct. Questionable cystic duct   stent extending into the gallbladder.  GALLBLADDER: 3.4 x 2.2 cm gallstone. Punctate calcification in the   gallbladder wall.  SPLEEN: Within normal limits.  PANCREAS: Atrophic.  ADRENALS: Within normal limits.  KIDNEYS/URETERS: Mildly atrophic lobular kidneys. No   hydroureteronephrosis or calculus.    BLADDER: Grossly unremarkable. Obscured by streak artifact from right hip   hardware.  REPRODUCTIVE ORGANS: Normal size with coarse calcification. Obscured by   streak artifact from right hip hardware.    BOWEL: No bowel obstruction. Appendix is normal.  PERITONEUM: No ascites.  VESSELS: Atherosclerotic changes.  RETROPERITONEUM/LYMPH NODES: No lymphadenopathy.  ABDOMINAL WALL: Small fat-containing umbilical hernia. Stable 2.9 cm   right inguinal repair plug. Stable 3.6 white 2.5 cm mass in the right   rectus sheath.  BONES: Right hip arthroplasty. Mild thoracolumbar degenerative changes.    IMPRESSION:  Cirrhosis.    Interval placement of biliary stent. Questionable cystic duct stent as   well. Large gallstone reidentified.    --- End of Report ---    ERNIE PENA MD; Attending Radiologist  This document has been electronically signed. Chuck  3 2022  9:58AM    < end of copied text >  < from: Xray Chest 1 View- PORTABLE-Urgent (Xray Chest 1 View- PORTABLE-Urgent .) (01.02.22 @ 23:12) >    ACC: [id number] EXAM:  XR CHEST PORTABLE URGENT 1V                          PROCEDURE DATE:  01/02/2022          INTERPRETATION:  Chest one view    HISTORY: Cough      COMPARISON STUDY: 4/19/2021    Frontal expiratory view of the chest shows the heart to be normal in   size. The lungs are clear and there is no evidence of pneumothorax nor   pleural effusion.    IMPRESSION:  No active pulmonary disease.  --- End of Report ---      ADELE PENG MD; Attending Interventional Radiologist  This document has been electronically signed. Chuck  3 2022  2:44PM    < end of copied text >    Consultant(s) Notes Reviewed:   YES      Plan of care was discussed with patient and /or primary care giver; all questions and concerns were addressed 
NP Note discussed with Primary Attending    Patient is a 81y old  Male who presents with a chief complaint of Melena (07 Jan 2022 10:30)      INTERVAL HPI/OVERNIGHT EVENTS: no new complaints- sitting up in bed eating breakfast.    MEDICATIONS  (STANDING):  cyanocobalamin 1000 MICROGram(s) Oral daily  furosemide    Tablet 20 milliGRAM(s) Oral daily  lactulose Syrup 15 Gram(s) Oral two times a day  multivitamin 1 Tablet(s) Oral daily  pantoprazole    Tablet 40 milliGRAM(s) Oral every 12 hours  polyethylene glycol 3350 17 Gram(s) Oral daily  propranolol 10 milliGRAM(s) Oral daily  sodium chloride 0.9%. 1000 milliLiter(s) (100 mL/Hr) IV Continuous <Continuous>  thiamine 100 milliGRAM(s) Oral daily    MEDICATIONS  (PRN):  acetaminophen     Tablet .. 650 milliGRAM(s) Oral every 6 hours PRN Mild Pain (1 - 3), Moderate Pain (4 - 6), Severe Pain (7 - 10)      __________________________________________________  REVIEW OF SYSTEMS:    CONSTITUTIONAL: No fever,   EYES: no acute visual disturbances  NECK: No pain or stiffness  RESPIRATORY: No cough; No shortness of breath  CARDIOVASCULAR: No chest pain, no palpitations  GASTROINTESTINAL: No pain. No nausea or vomiting; No diarrhea   NEUROLOGICAL: No headache or numbness, no tremors  MUSCULOSKELETAL: No joint pain, no muscle pain  GENITOURINARY: no dysuria, no frequency, no hesitancy  PSYCHIATRY: no depression , no anxiety  ALL OTHER  ROS negative        Vital Signs Last 24 Hrs  T(C): 36.2 (07 Jan 2022 08:25), Max: 36.9 (06 Jan 2022 21:46)  T(F): 97.1 (07 Jan 2022 08:25), Max: 98.5 (06 Jan 2022 21:46)  HR: 71 (07 Jan 2022 08:25) (63 - 77)  BP: 125/88 (07 Jan 2022 08:25) (96/58 - 125/88)  BP(mean): --  RR: 20 (07 Jan 2022 08:25) (16 - 20)  SpO2: 99% (07 Jan 2022 08:25) (98% - 100%)    ________________________________________________  PHYSICAL EXAM:  GENERAL: NAD  HEENT: Normocephalic;  conjunctivae and sclerae clear; moist mucous membranes;   NECK : supple  CHEST/LUNG: Clear to auscultation bilaterally with good air entry   HEART: S1 S2  regular; no murmurs, gallops or rubs  ABDOMEN: Soft, Nontender, Nondistended; Bowel sounds present  EXTREMITIES: moving all extremities equally, no cyanosis; no edema; no calf tenderness  SKIN: warm and dry; no rash  NERVOUS SYSTEM:  Awake and alert; Oriented  to place, person and time ; no new deficits    _________________________________________________  LABS:                        11.6   5.89  )-----------( 167      ( 06 Jan 2022 06:14 )             35.1     01-06    139  |  107  |  10  ----------------------------<  92  3.8   |  26  |  0.97    Ca    8.7      06 Jan 2022 06:14    TPro  5.8<L>  /  Alb  2.0<L>  /  TBili  0.5  /  DBili  x   /  AST  27  /  ALT  18  /  AlkPhos  218<H>  01-06        CAPILLARY BLOOD GLUCOSE            RADIOLOGY & ADDITIONAL TESTS:    Imaging  Reviewed:  YES/NO    Consultant(s) Notes Reviewed:   YES/ No      Plan of care was discussed with patient and /or primary care giver; all questions and concerns were addressed 
Patient is a 81y old  Male who presents with a chief complaint of Melena (05 Jan 2022 17:07)    INTERVAL HPI/OVERNIGHT EVENTS: NO acute events overnight    REVIEW OF SYSTEMS:  CONSTITUTIONAL: No fever, chills  ENMT:  No difficulty hearing, no change in vision  NECK: No pain or stiffness  RESPIRATORY: No cough, SOB  CARDIOVASCULAR: No chest pain, palpitations  GASTROINTESTINAL: No abdominal pain. No nausea, vomiting, or diarrhea  GENITOURINARY: No dysuria  NEUROLOGICAL: No HA  SKIN: No itching, burning, rashes, or lesions   LYMPH NODES: No enlarged glands  ENDOCRINE: No heat or cold intolerance; No hair loss  MUSCULOSKELETAL: No joint pain or swelling; No muscle, back, or extremity pain  PSYCHIATRIC: No depression, anxiety  HEME/LYMPH: No easy bruising, or bleeding gums    T(C): 36.8 (01-06-22 @ 05:26), Max: 36.8 (01-06-22 @ 05:26)  HR: 66 (01-06-22 @ 05:26) (66 - 76)  BP: 118/79 (01-06-22 @ 05:26) (102/64 - 142/78)  RR: 18 (01-06-22 @ 05:26) (18 - 18)  SpO2: 100% (01-06-22 @ 05:26) (98% - 100%)  Wt(kg): --Vital Signs Last 24 Hrs  T(C): 36.8 (06 Jan 2022 05:26), Max: 36.8 (06 Jan 2022 05:26)  T(F): 98.3 (06 Jan 2022 05:26), Max: 98.3 (06 Jan 2022 05:26)  HR: 66 (06 Jan 2022 05:26) (66 - 76)  BP: 118/79 (06 Jan 2022 05:26) (102/64 - 142/78)  BP(mean): --  RR: 18 (06 Jan 2022 05:26) (18 - 18)  SpO2: 100% (06 Jan 2022 05:26) (98% - 100%)    MEDICATIONS  (STANDING):  cyanocobalamin 1000 MICROGram(s) Oral daily  furosemide    Tablet 20 milliGRAM(s) Oral daily  lactulose Syrup 15 Gram(s) Oral two times a day  multivitamin 1 Tablet(s) Oral daily  pantoprazole    Tablet 40 milliGRAM(s) Oral every 12 hours  polyethylene glycol 3350 17 Gram(s) Oral daily  propranolol 10 milliGRAM(s) Oral daily  sodium chloride 0.9%. 1000 milliLiter(s) (100 mL/Hr) IV Continuous <Continuous>  thiamine 100 milliGRAM(s) Oral daily    MEDICATIONS  (PRN):  acetaminophen     Tablet .. 650 milliGRAM(s) Oral every 6 hours PRN Mild Pain (1 - 3), Moderate Pain (4 - 6), Severe Pain (7 - 10)      PHYSICAL EXAM:  GENERAL: NAD  EYES: clear conjunctiva; EOMI  ENMT: Moist mucous membranes  NECK: Supple, No JVD, Normal thyroid  CHEST/LUNG: Clear to auscultation bilaterally; No rales, rhonchi, wheezing, or rubs  HEART: S1, S2, Regular rate and rhythm  ABDOMEN: Soft, Nontender, Nondistended; Bowel sounds present  NEURO: Alert & Oriented X3  EXTREMITIES: No LE edema, no calf tenderness  LYMPH: No lymphadenopathy noted  SKIN: No rashes or lesions    Consultant(s) Notes Reviewed:  [x ] YES  [ ] NO  Care Discussed with Consultants/Other Providers [ x] YES  [ ] NO    LABS:                        11.6   5.89  )-----------( 167      ( 06 Jan 2022 06:14 )             35.1     01-06    139  |  107  |  10  ----------------------------<  92  3.8   |  26  |  0.97    Ca    8.7      06 Jan 2022 06:14    TPro  5.8<L>  /  Alb  2.0<L>  /  TBili  0.5  /  DBili  x   /  AST  27  /  ALT  18  /  AlkPhos  218<H>  01-06      CAPILLARY BLOOD GLUCOSE    RADIOLOGY & ADDITIONAL TESTS:    Imaging Personally Reviewed:  [ x] YES  [ ] NO  < from: CT Abdomen and Pelvis No Cont (01.03.22 @ 07:40) >  ACC: [id number] EXAM:  CT ABDOMEN AND PELVIS                          PROCEDURE DATE:  01/03/2022          INTERPRETATION:  CLINICAL INFORMATION: 81 years  Male with melena.    COMPARISON: Contrast-enhanced CT 2/6/2021    CONTRAST/COMPLICATIONS:  IV Contrast: Omnipaque 350  90 cc administered   10 cc discarded  Oral Contrast: NONE  Complications: None reported at time of study completion    PROCEDURE:  CT of the Abdomen and Pelvis was performed.  Sagittal and coronal reformats were performed.    LIMITATIONS: Evaluation of the solid organs, vascular structures and GI   tract is limited without oral and IV contrast.    FINDINGS:  LOWER CHEST: Coronary artery calcifications.    LIVER: Shrunken liver with nodular contour.  BILE DUCTS: Biliary stent in the common duct. Questionable cystic duct   stent extending into the gallbladder.  GALLBLADDER: 3.4 x 2.2 cm gallstone. Punctate calcification in the   gallbladder wall.  SPLEEN: Within normal limits.  PANCREAS: Atrophic.  ADRENALS: Within normal limits.  KIDNEYS/URETERS: Mildly atrophic lobular kidneys. No   hydroureteronephrosis or calculus.    BLADDER: Grossly unremarkable. Obscured by streak artifact from right hip   hardware.  REPRODUCTIVE ORGANS: Normal size with coarse calcification. Obscured by   streak artifact from right hip hardware.    BOWEL: No bowel obstruction. Appendix is normal.  PERITONEUM: No ascites.  VESSELS: Atherosclerotic changes.  RETROPERITONEUM/LYMPH NODES: No lymphadenopathy.  ABDOMINAL WALL: Small fat-containing umbilical hernia. Stable 2.9 cm   right inguinal repair plug. Stable 3.6 white 2.5 cm mass in the right   rectus sheath.  BONES: Right hip arthroplasty. Mild thoracolumbar degenerative changes.    IMPRESSION:  Cirrhosis.    Interval placement of biliary stent. Questionable cystic duct stent as   well. Large gallstone reidentified.        --- End of Report ---            ERNIE PENA MD; Attending Radiologist  This document has been electronically signed. Chuck  3 2022  9:58AM    < end of copied text >  < from: Xray Chest 1 View- PORTABLE-Urgent (Xray Chest 1 View- PORTABLE-Urgent .) (01.02.22 @ 23:12) >    ACC: [id number] EXAM:  XR CHEST PORTABLE URGENT 1V                          PROCEDURE DATE:  01/02/2022          INTERPRETATION:  Chest one view    HISTORY: Cough      COMPARISON STUDY: 4/19/2021    Frontal expiratory view of the chest shows the heart to be normal in   size. The lungs are clear and there is no evidence of pneumothorax nor   pleural effusion.    IMPRESSION:  No active pulmonary disease.        Thank you for the courtesy of this referral.    --- End of Report ---            ADELE PENG MD; Attending Interventional Radiologist  This document has been electronically signed. Chuck  3 2022  2:44PM    < end of copied text >    
NP Note discussed with Primary Attending    Patient is a 81y old  Male who presents with a chief complaint of Melena (05 Jan 2022 10:30AM)      INTERVAL HPI/OVERNIGHT EVENTS: no new complaints    MEDICATIONS  (STANDING):  cyanocobalamin 1000 MICROGram(s) Oral daily  furosemide    Tablet 20 milliGRAM(s) Oral daily  lactulose Syrup 15 Gram(s) Oral two times a day  multivitamin 1 Tablet(s) Oral daily  pantoprazole  Injectable 40 milliGRAM(s) IV Push every 12 hours  polyethylene glycol 3350 17 Gram(s) Oral daily  propranolol 10 milliGRAM(s) Oral daily  sodium chloride 0.9%. 1000 milliLiter(s) (100 mL/Hr) IV Continuous <Continuous>  thiamine 100 milliGRAM(s) Oral daily    MEDICATIONS  (PRN):      __________________________________________________  REVIEW OF SYSTEMS:    CONSTITUTIONAL: No fever,   EYES: no acute visual disturbances  NECK: No pain or stiffness  RESPIRATORY: No cough; No shortness of breath  CARDIOVASCULAR: No chest pain, no palpitations  GASTROINTESTINAL: No pain. No nausea or vomiting; No diarrhea   NEUROLOGICAL: No headache or numbness, no tremors  MUSCULOSKELETAL: No joint pain, no muscle pain  GENITOURINARY: no dysuria, no frequency, no hesitancy  PSYCHIATRY: no depression , no anxiety  ALL OTHER  ROS negative        Vital Signs Last 24 Hrs  T(C): 36.6 (05 Jan 2022 13:31), Max: 36.7 (05 Jan 2022 05:45)  T(F): 97.9 (05 Jan 2022 13:31), Max: 98.1 (05 Jan 2022 05:45)  HR: 68 (05 Jan 2022 13:31) (66 - 78)  BP: 131/78 (05 Jan 2022 13:31) (102/64 - 142/78)  BP(mean): --  RR: 18 (05 Jan 2022 13:31) (18 - 18)  SpO2: 100% (05 Jan 2022 13:31) (98% - 100%)    ________________________________________________  PHYSICAL EXAM:  GENERAL: NAD  HEENT: Normocephalic;  conjunctivae and sclerae clear; moist mucous membranes;   NECK : supple  CHEST/LUNG: Clear to auscultation bilaterally with good air entry   HEART: S1 S2  regular; no murmurs, gallops or rubs  ABDOMEN: Soft, Nontender, Nondistended; Bowel sounds present  EXTREMITIES: moving all extremities equally but weak, no cyanosis; no edema; no calf tenderness  SKIN: warm and dry; no rash  NERVOUS SYSTEM:  Awake and alert; Oriented  to place, person and time ; no new deficits    _________________________________________________  LABS:                        12.0   3.96  )-----------( 145      ( 04 Jan 2022 08:05 )             36.3     01-04    140  |  105  |  15  ----------------------------<  94  4.6   |  29  |  1.18    Ca    8.7      04 Jan 2022 08:05          CAPILLARY BLOOD GLUCOSE            RADIOLOGY & ADDITIONAL TESTS:    Imaging  Reviewed:  YES/NO    Consultant(s) Notes Reviewed:   YES/ No      Plan of care was discussed with patient and /or primary care giver; all questions and concerns were addressed 
NP Note discussed with  primary attending    Patient is a 81y old  Male who presents with a chief complaint of Melena (02 Jan 2022 21:54)      INTERVAL HPI/OVERNIGHT EVENTS: no new complaints    MEDICATIONS  (STANDING):  cyanocobalamin 1000 MICROGram(s) Oral daily  furosemide    Tablet 20 milliGRAM(s) Oral daily  lactulose Syrup 15 Gram(s) Oral two times a day  multivitamin 1 Tablet(s) Oral daily  pantoprazole  Injectable 40 milliGRAM(s) IV Push every 12 hours  propranolol 10 milliGRAM(s) Oral daily  thiamine 100 milliGRAM(s) Oral daily    MEDICATIONS  (PRN):      __________________________________________________  REVIEW OF SYSTEMS:    CONSTITUTIONAL: No fever,   RESPIRATORY: No cough; No shortness of breath  CARDIOVASCULAR: No chest pain, no palpitations  GASTROINTESTINAL: No pain. No nausea or vomiting; No diarrhea   NEUROLOGICAL: No headache or numbness, no tremors  MUSCULOSKELETAL: No joint pain, no muscle pain  GENITOURINARY: no dysuria, no frequency, no hesitancy        Vital Signs Last 24 Hrs  T(C): 36.4 (03 Jan 2022 12:02), Max: 36.9 (02 Jan 2022 16:35)  T(F): 97.5 (03 Jan 2022 12:02), Max: 98.5 (02 Jan 2022 16:35)  HR: 59 (03 Jan 2022 12:02) (59 - 84)  BP: 107/62 (03 Jan 2022 12:02) (96/68 - 120/77)  BP(mean): --  RR: 18 (03 Jan 2022 12:02) (18 - 18)  SpO2: 100% (03 Jan 2022 12:02) (95% - 100%)    ________________________________________________  PHYSICAL EXAM:  GENERAL: NAD  HEENT: Normocephalic;  conjunctivae and sclerae clear; moist mucous membranes;   CHEST/LUNG: Clear to ausculitation bilaterally   HEART: S1 S2  regular; no murmurs, gallops or rubs  ABDOMEN: Soft, Nontender, Nondistended; Bowel sounds present  EXTREMITIES: no cyanosis; no edema; no calf tenderness  SKIN: warm and dry; no rash  NERVOUS SYSTEM:  Awake and alert; Oriented  to place, person and disoriented to time ; no new deficits    _________________________________________________  LABS:                        11.7   5.91  )-----------( 142      ( 03 Jan 2022 06:13 )             35.7     01-03    137  |  105  |  19<H>  ----------------------------<  84  3.9   |  24  |  1.19    Ca    8.4      03 Jan 2022 06:13  Phos  2.0     01-03  Mg     1.7     01-03    TPro  5.9<L>  /  Alb  2.2<L>  /  TBili  1.1  /  DBili  x   /  AST  54<H>  /  ALT  19  /  AlkPhos  223<H>  01-03    PT/INR - ( 03 Jan 2022 06:13 )   PT: 19.4 sec;   INR: 1.67 ratio         PTT - ( 03 Jan 2022 06:13 )  PTT:30.4 sec    CAPILLARY BLOOD GLUCOSE            RADIOLOGY & ADDITIONAL TESTS:    Imaging Personally Reviewed:  YES/NO    Consultant(s) Notes Reviewed:   YES/ No    Care Discussed with Consultants :     Plan of care was discussed with patient and /or primary care giver; all questions and concerns were addressed and care was aligned with patient's wishes.

## 2022-01-07 NOTE — PROGRESS NOTE ADULT - PROBLEM SELECTOR PLAN 12
from home  pt exposed to COVID, PCR negative from 1/4 but pt needs 5 days isolation per infection control.   CM consulted for tavia placement, PT consulted.  D/c to [facility] today

## 2022-01-07 NOTE — ADVANCED PRACTICE NURSE CONSULT - ASSESSMENT
This is a 81yr old male patient admitted for Melena, presenting with a Stage 1 Pressure Injury to the Coccyx, as evident by non-blanchable erythema

## 2022-01-07 NOTE — PROGRESS NOTE ADULT - ATTENDING COMMENTS
patient was not dced yesterday due to transportation issues.   Stable today, no complaints  dced today to SNF

## 2022-01-07 NOTE — PROGRESS NOTE ADULT - PROBLEM SELECTOR PLAN 11
Pt is full code as per Pt and his wife

## 2022-01-07 NOTE — PROGRESS NOTE ADULT - PROBLEM SELECTOR PLAN 3
had plastic stent placed in 7/2021, needs replacement done by Dr. Neftaly Mckeon    AST/ALT 83/23  Bili wnl  known gall bladder disease  CT shows + large stones   GI Dr. Licea following, no role for EGD this time. rec to follow with Dr. Mckeon for further EHL and stone removal.  called office dr. Mike Higginbotham [phone], office will call for an appointment

## 2022-01-18 NOTE — H&P ADULT - PROBLEM SELECTOR PROBLEM 6
Discharge planning issues Goals of care, counseling/discussion Arthritis HTN (hypertension) Unna Boot Text: An Unna boot was placed to help immobilize the limb and facilitate more rapid healing.

## 2022-01-21 PROBLEM — N18.9 CHRONIC KIDNEY DISEASE, UNSPECIFIED: Chronic | Status: ACTIVE | Noted: 2022-01-02

## 2022-01-21 NOTE — H&P ADULT - DOES THIS PATIENT HAVE A HISTORY OF OR HAS BEEN DX WITH HEART FAILURE?
unknown Star Wedge Flap Text: The defect edges were debeveled with a #15 scalpel blade.  Given the location of the defect, shape of the defect and the proximity to free margins a star wedge flap was deemed most appropriate.  Using a sterile surgical marker, an appropriate rotation flap was drawn incorporating the defect and placing the expected incisions within the relaxed skin tension lines where possible. The area thus outlined was incised deep to adipose tissue with a #15 scalpel blade.  The skin margins were undermined to an appropriate distance in all directions utilizing iris scissors.

## 2022-01-28 ENCOUNTER — INPATIENT (INPATIENT)
Facility: HOSPITAL | Age: 82
LOS: 5 days | Discharge: NURSING, REHAB OR LONG-TERM CARE | DRG: 177 | End: 2022-02-03
Attending: INTERNAL MEDICINE | Admitting: INTERNAL MEDICINE
Payer: COMMERCIAL

## 2022-01-28 VITALS
DIASTOLIC BLOOD PRESSURE: 55 MMHG | SYSTOLIC BLOOD PRESSURE: 100 MMHG | TEMPERATURE: 99 F | RESPIRATION RATE: 18 BRPM | HEIGHT: 60.8 IN | OXYGEN SATURATION: 98 % | HEART RATE: 56 BPM

## 2022-01-28 DIAGNOSIS — Z98.890 OTHER SPECIFIED POSTPROCEDURAL STATES: Chronic | ICD-10-CM

## 2022-01-28 DIAGNOSIS — G93.40 ENCEPHALOPATHY, UNSPECIFIED: ICD-10-CM

## 2022-01-28 DIAGNOSIS — M25.50 PAIN IN UNSPECIFIED JOINT: ICD-10-CM

## 2022-01-28 DIAGNOSIS — U07.1 COVID-19: ICD-10-CM

## 2022-01-28 DIAGNOSIS — Z96.649 PRESENCE OF UNSPECIFIED ARTIFICIAL HIP JOINT: Chronic | ICD-10-CM

## 2022-01-28 DIAGNOSIS — N18.9 CHRONIC KIDNEY DISEASE, UNSPECIFIED: ICD-10-CM

## 2022-01-28 DIAGNOSIS — K74.60 UNSPECIFIED CIRRHOSIS OF LIVER: ICD-10-CM

## 2022-01-28 DIAGNOSIS — K80.20 CALCULUS OF GALLBLADDER WITHOUT CHOLECYSTITIS WITHOUT OBSTRUCTION: ICD-10-CM

## 2022-01-28 DIAGNOSIS — Z29.9 ENCOUNTER FOR PROPHYLACTIC MEASURES, UNSPECIFIED: ICD-10-CM

## 2022-01-28 LAB
ALBUMIN SERPL ELPH-MCNC: 1.9 G/DL — LOW (ref 3.5–5)
ALP SERPL-CCNC: 362 U/L — HIGH (ref 40–120)
ALT FLD-CCNC: 24 U/L DA — SIGNIFICANT CHANGE UP (ref 10–60)
AMMONIA BLD-MCNC: 30 UMOL/L — SIGNIFICANT CHANGE UP (ref 11–32)
ANION GAP SERPL CALC-SCNC: 5 MMOL/L — SIGNIFICANT CHANGE UP (ref 5–17)
AST SERPL-CCNC: 40 U/L — SIGNIFICANT CHANGE UP (ref 10–40)
BASOPHILS # BLD AUTO: 0.04 K/UL — SIGNIFICANT CHANGE UP (ref 0–0.2)
BASOPHILS NFR BLD AUTO: 0.5 % — SIGNIFICANT CHANGE UP (ref 0–2)
BILIRUB SERPL-MCNC: 0.9 MG/DL — SIGNIFICANT CHANGE UP (ref 0.2–1.2)
BUN SERPL-MCNC: 25 MG/DL — HIGH (ref 7–18)
CALCIUM SERPL-MCNC: 8.5 MG/DL — SIGNIFICANT CHANGE UP (ref 8.4–10.5)
CHLORIDE SERPL-SCNC: 106 MMOL/L — SIGNIFICANT CHANGE UP (ref 96–108)
CO2 SERPL-SCNC: 29 MMOL/L — SIGNIFICANT CHANGE UP (ref 22–31)
CREAT SERPL-MCNC: 1 MG/DL — SIGNIFICANT CHANGE UP (ref 0.5–1.3)
EOSINOPHIL # BLD AUTO: 0.08 K/UL — SIGNIFICANT CHANGE UP (ref 0–0.5)
EOSINOPHIL NFR BLD AUTO: 0.9 % — SIGNIFICANT CHANGE UP (ref 0–6)
GLUCOSE SERPL-MCNC: 100 MG/DL — HIGH (ref 70–99)
HCT VFR BLD CALC: 35.6 % — LOW (ref 39–50)
HGB BLD-MCNC: 11.3 G/DL — LOW (ref 13–17)
IMM GRANULOCYTES NFR BLD AUTO: 1.3 % — SIGNIFICANT CHANGE UP (ref 0–1.5)
LYMPHOCYTES # BLD AUTO: 1.3 K/UL — SIGNIFICANT CHANGE UP (ref 1–3.3)
LYMPHOCYTES # BLD AUTO: 15.2 % — SIGNIFICANT CHANGE UP (ref 13–44)
MCHC RBC-ENTMCNC: 25.3 PG — LOW (ref 27–34)
MCHC RBC-ENTMCNC: 31.7 GM/DL — LOW (ref 32–36)
MCV RBC AUTO: 79.8 FL — LOW (ref 80–100)
MONOCYTES # BLD AUTO: 1.44 K/UL — HIGH (ref 0–0.9)
MONOCYTES NFR BLD AUTO: 16.8 % — HIGH (ref 2–14)
NEUTROPHILS # BLD AUTO: 5.61 K/UL — SIGNIFICANT CHANGE UP (ref 1.8–7.4)
NEUTROPHILS NFR BLD AUTO: 65.3 % — SIGNIFICANT CHANGE UP (ref 43–77)
NRBC # BLD: 0 /100 WBCS — SIGNIFICANT CHANGE UP (ref 0–0)
PLATELET # BLD AUTO: 235 K/UL — SIGNIFICANT CHANGE UP (ref 150–400)
POTASSIUM SERPL-MCNC: 4.2 MMOL/L — SIGNIFICANT CHANGE UP (ref 3.5–5.3)
POTASSIUM SERPL-SCNC: 4.2 MMOL/L — SIGNIFICANT CHANGE UP (ref 3.5–5.3)
PROT SERPL-MCNC: 6.6 G/DL — SIGNIFICANT CHANGE UP (ref 6–8.3)
RBC # BLD: 4.46 M/UL — SIGNIFICANT CHANGE UP (ref 4.2–5.8)
RBC # FLD: 18.5 % — HIGH (ref 10.3–14.5)
SARS-COV-2 RNA SPEC QL NAA+PROBE: DETECTED
SODIUM SERPL-SCNC: 140 MMOL/L — SIGNIFICANT CHANGE UP (ref 135–145)
TROPONIN I, HIGH SENSITIVITY RESULT: 13.5 NG/L — SIGNIFICANT CHANGE UP
WBC # BLD: 8.58 K/UL — SIGNIFICANT CHANGE UP (ref 3.8–10.5)
WBC # FLD AUTO: 8.58 K/UL — SIGNIFICANT CHANGE UP (ref 3.8–10.5)

## 2022-01-28 PROCEDURE — 99285 EMERGENCY DEPT VISIT HI MDM: CPT

## 2022-01-28 PROCEDURE — 71045 X-RAY EXAM CHEST 1 VIEW: CPT | Mod: 26

## 2022-01-28 PROCEDURE — 70450 CT HEAD/BRAIN W/O DYE: CPT | Mod: 26,MA

## 2022-01-28 PROCEDURE — 93010 ELECTROCARDIOGRAM REPORT: CPT | Mod: 76

## 2022-01-28 PROCEDURE — 73502 X-RAY EXAM HIP UNI 2-3 VIEWS: CPT | Mod: 26,LT

## 2022-01-28 RX ORDER — DEXAMETHASONE 0.5 MG/5ML
6 ELIXIR ORAL ONCE
Refills: 0 | Status: COMPLETED | OUTPATIENT
Start: 2022-01-28 | End: 2022-01-28

## 2022-01-28 RX ORDER — SODIUM CHLORIDE 9 MG/ML
2000 INJECTION INTRAMUSCULAR; INTRAVENOUS; SUBCUTANEOUS ONCE
Refills: 0 | Status: DISCONTINUED | OUTPATIENT
Start: 2022-01-28 | End: 2022-01-28

## 2022-01-28 RX ORDER — CEFTRIAXONE 500 MG/1
1000 INJECTION, POWDER, FOR SOLUTION INTRAMUSCULAR; INTRAVENOUS ONCE
Refills: 0 | Status: DISCONTINUED | OUTPATIENT
Start: 2022-01-28 | End: 2022-01-28

## 2022-01-28 RX ORDER — FAMOTIDINE 10 MG/ML
1 INJECTION INTRAVENOUS
Qty: 0 | Refills: 0 | DISCHARGE

## 2022-01-28 RX ORDER — THIAMINE MONONITRATE (VIT B1) 100 MG
100 TABLET ORAL DAILY
Refills: 0 | Status: DISCONTINUED | OUTPATIENT
Start: 2022-01-28 | End: 2022-02-03

## 2022-01-28 RX ORDER — LACTULOSE 10 G/15ML
10 SOLUTION ORAL
Refills: 0 | Status: DISCONTINUED | OUTPATIENT
Start: 2022-01-28 | End: 2022-02-03

## 2022-01-28 RX ORDER — FUROSEMIDE 40 MG
20 TABLET ORAL DAILY
Refills: 0 | Status: DISCONTINUED | OUTPATIENT
Start: 2022-01-28 | End: 2022-02-03

## 2022-01-28 RX ORDER — DEXAMETHASONE 0.5 MG/5ML
6 ELIXIR ORAL DAILY
Refills: 0 | Status: DISCONTINUED | OUTPATIENT
Start: 2022-01-28 | End: 2022-02-02

## 2022-01-28 RX ORDER — POLYETHYLENE GLYCOL 3350 17 G/17G
17 POWDER, FOR SOLUTION ORAL DAILY
Refills: 0 | Status: DISCONTINUED | OUTPATIENT
Start: 2022-01-28 | End: 2022-02-03

## 2022-01-28 RX ORDER — NYSTATIN CREAM 100000 [USP'U]/G
1 CREAM TOPICAL THREE TIMES A DAY
Refills: 0 | Status: DISCONTINUED | OUTPATIENT
Start: 2022-01-28 | End: 2022-02-03

## 2022-01-28 RX ORDER — PREGABALIN 225 MG/1
1000 CAPSULE ORAL DAILY
Refills: 0 | Status: DISCONTINUED | OUTPATIENT
Start: 2022-01-28 | End: 2022-02-03

## 2022-01-28 RX ORDER — AZITHROMYCIN 500 MG/1
500 TABLET, FILM COATED ORAL ONCE
Refills: 0 | Status: DISCONTINUED | OUTPATIENT
Start: 2022-01-28 | End: 2022-01-28

## 2022-01-28 RX ORDER — ENOXAPARIN SODIUM 100 MG/ML
40 INJECTION SUBCUTANEOUS DAILY
Refills: 0 | Status: DISCONTINUED | OUTPATIENT
Start: 2022-01-28 | End: 2022-01-29

## 2022-01-28 RX ORDER — PANTOPRAZOLE SODIUM 20 MG/1
40 TABLET, DELAYED RELEASE ORAL
Refills: 0 | Status: DISCONTINUED | OUTPATIENT
Start: 2022-01-28 | End: 2022-02-03

## 2022-01-28 RX ADMIN — Medication 6 MILLIGRAM(S): at 19:53

## 2022-01-28 NOTE — ED ADULT NURSE NOTE - CAS TRG GENERAL NORM CIRC DET
Group Topic:  Activity Group    Date: 1/14/2022  Start Time:  2:00 PM  End Time:  2:50 PM  Facilitators: LEXY Arcos    Focus: Behavioral Activation (Daily Life Skills)  Number in attendance: 8    A presentation was given on the use of guided activity to counteract avoidance patterns that maintain or worsen depression. The presentation included guided discussions on the benefits of changing behavior to increase engagement in antidepressant activities to reduce the negative effects of depression.       Method: Group   Attendance: Present  Participation: Active  Patient Response: Appropriate feedback, Attentive and Good eye contact    Pt appeared attentive throughout the activity group. Pt engaged in the group discussion by sharing he wants to work on having a balance between mastery and pleasant goals.    LEXY Arcos    Strong peripheral pulses

## 2022-01-28 NOTE — H&P ADULT - PROBLEM SELECTOR PLAN 2
Pt coming in with AMS  CXR with New nonspecific airspace opacities lung bases  COVID PCR positive  Will start on decadron   Will continue with supplemental oxygen, increase support as needed  Will start on lovenox BID dosing  f/u inflamatory markers including ferritin, crp  f/u and trend d-dimer  Monitor EKG daily for QTC  Consider early proning if pt starts to desaturate on NRB  Low threshold for ICU consultation if pt desaturates Pt coming in with AMS  CXR with New nonspecific airspace opacities lung bases  COVID PCR positive  Will start on decadron   Will continue with supplemental oxygen, increase support as needed  Will start on full dose lovenox as Dimer>2ULN  f/u inflamatory markers including ferritin, crp  f/u and trend d-dimer  Monitor EKG daily for QTC  Consider early proning if pt starts to desaturate on NRB  Low threshold for ICU consultation if pt desaturates

## 2022-01-28 NOTE — H&P ADULT - PROBLEM SELECTOR PLAN 6
Plan: had plastic stent placed in 7/2021, needs replacement done by Dr. Neftaly Mckeon    AST/ALT 40/24  Bili wnl  known gall bladder disease  Was recommended to follow with Dr. Mckeon for further EHL and stone removal on previous admission BUN/Cr: 25/1  Avoid nephrotoxins   Follow BMP

## 2022-01-28 NOTE — ED PROVIDER NOTE - CARE PLAN
Principal Discharge DX:	2019 novel coronavirus disease (COVID-19)  Secondary Diagnosis:	Encephalopathy due to COVID-19 virus   1

## 2022-01-28 NOTE — H&P ADULT - NSHPPHYSICALEXAM_GEN_ALL_CORE
LOS:     VITALS:   T(C): 37.1 (01-28-22 @ 16:13), Max: 37.1 (01-28-22 @ 16:13)  HR: 56 (01-28-22 @ 16:13) (56 - 56)  BP: 100/55 (01-28-22 @ 16:13) (100/55 - 100/55)  RR: 18 (01-28-22 @ 16:13) (18 - 18)  SpO2: 98% (01-28-22 @ 16:13) (98% - 98%)    GENERAL: NAD  HEAD:  Atraumatic, Normocephalic  EYES: EOMI, PERRLA, conjunctiva and sclera clear  ENT: Moist mucous membranes  NECK: Supple, No JVD  CHEST/LUNG: Clear to auscultation bilaterally; No rales, rhonchi, wheezing, or rubs. Unlabored respirations  HEART: Regular rate and rhythm; No murmurs, rubs, or gallops  ABDOMEN: BSx4; Soft, nontender, nondistended  EXTREMITIES:  Left arm and left leg ROM limited by pain. 2+ Peripheral Pulses, brisk capillary refill. No clubbing, cyanosis, or edema  NERVOUS SYSTEM:  A&Ox1, no focal deficits   SKIN: No rashes or lesions

## 2022-01-28 NOTE — H&P ADULT - HISTORY OF PRESENT ILLNESS
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 Patient is able to maintain good conversation but is AOx1 (oriented to place but not person and time). Accoring to NH papers pt was noted tp have increasing confusion and weakness. He was recently diagnosed with Covid 19 on 1/10/22 and completed 14 day quarantine. Also completed 14 days of ceftin for bronchitis.  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 Patient is able to maintain good conversation but is AOx1 (oriented to place but not person and time). Accoring to NH papers pt was noted tp have increasing confusion and weakness. He was recently diagnosed with Covid 19 on 1/10/22 and completed 14 day quarantine. Also completed 14 days of ceftin for bronchitis. Patient states he has been feeling unwell for many years. Complains of left arm pain at the shoulder and left hip pain. 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 Patient is able to maintain good conversation but is AOx1 (oriented to place but not person and time). Accoring to NH papers pt was noted tp have increasing confusion and weakness. He was recently diagnosed with Covid 19 on 1/10/22 and completed 14 day quarantine. Also completed 14 days of ceftin for bronchitis. Patient states he has been feeling unwell for many years. Complains of left arm pain at the shoulder and left hip pain. Denies trauma, abdominal pain, fever, chills, cough, or any other acute complaints.   Called daughter, Eben, but she was out of the country  Called wife but she did not .  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 Patient is able to maintain good conversation but is AOx1 (oriented to place but not person and time). Accoring to NH papers pt was noted tp have increasing confusion and weakness. He was recently diagnosed with Covid 19 on 1/10/22 and completed 14 day quarantine. Also completed 14 days of ceftin for bronchitis. Patient states he has been feeling unwell for many years. Complains of left arm pain at the shoulder and left hip pain. Denies trauma, abdominal pain, fever, chills, cough, or any other acute complaints.   Called daughter, Eben, but she was out of the country  Called wife but she did not .

## 2022-01-28 NOTE — H&P ADULT - NSICDXPASTMEDICALHX_GEN_ALL_CORE_FT
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

## 2022-01-28 NOTE — ED PROVIDER NOTE - NSICDXPASTMEDICALHX_GEN_ALL_CORE_FT
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

## 2022-01-28 NOTE — H&P ADULT - PROBLEM SELECTOR PLAN 8
IMPROVE VTE Individual Risk Assessment  RISK                                                                Points  [  ] Previous VTE                                                  3  [  ] Thrombophilia                                               2  [  ] Lower limb paralysis                                      2        (unable to hold up >15 seconds)    [  ] Current Cancer                                              2         (within 6 months)  [x  ] Immobilization > 24 hrs                                1  [  ] ICU/CCU stay > 24 hours                              1  [x  ] Age > 60                                                      1  IMPROVE VTE Score _________2, -- for DVT proph  on full dose lovenox

## 2022-01-28 NOTE — H&P ADULT - PROBLEM SELECTOR PLAN 7
IMPROVE VTE Individual Risk Assessment  RISK                                                                Points  [  ] Previous VTE                                                  3  [  ] Thrombophilia                                               2  [  ] Lower limb paralysis                                      2        (unable to hold up >15 seconds)    [  ] Current Cancer                                              2         (within 6 months)  [x  ] Immobilization > 24 hrs                                1  [  ] ICU/CCU stay > 24 hours                              1  [x  ] Age > 60                                                      1  IMPROVE VTE Score _________2, -- for DVT proph  on full dose lovenox Plan: had plastic stent placed in 7/2021, needs replacement done by Dr. Neftaly Mckeon    AST/ALT 40/24  Bili wnl  known gall bladder disease  Was recommended to follow with Dr. Mckeon for further EHL and stone removal on previous admission

## 2022-01-28 NOTE — H&P ADULT - ASSESSMENT
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 Admitted for further workup

## 2022-01-28 NOTE — ED PROVIDER NOTE - MUSCULOSKELETAL, MLM
Spine appears normal, range of motion is not limited,  tender to l hip w some pain to rom.  - otherwise no muscle or joint tenderness. no leg swelling. nl le sensory and pulse.

## 2022-01-28 NOTE — H&P ADULT - PROBLEM SELECTOR PLAN 3
Left shoulder and hip pain   HX cervical spinal stenosis, underwent decompression 7/2021.   PT consulted UA +  started on ceftriaxone  Follow urine cultures

## 2022-01-28 NOTE — ED ADULT NURSE NOTE - NSIMPLEMENTINTERV_GEN_ALL_ED
Implemented All Fall with Harm Risk Interventions:  [place] to call system. Call bell, personal items and telephone within reach. Instruct patient to call for assistance. Room bathroom lighting operational. Non-slip footwear when patient is off stretcher. Physically safe environment: no spills, clutter or unnecessary equipment. Stretcher in lowest position, wheels locked, appropriate side rails in place. Provide visual cue, wrist band, yellow gown, etc. Monitor gait and stability. Monitor for mental status changes and reorient to person, place, and time. Review medications for side effects contributing to fall risk. Reinforce activity limits and safety measures with patient and family. Provide visual clues: red socks.

## 2022-01-28 NOTE — ED PROVIDER NOTE - OBJECTIVE STATEMENT
81 male hx below, sent from snf. per report pt has confusion / mental status change. not especially drowsy. no focal findings. generally not making sense. they were concerned for hepatic encephalopathy. dw his daughter, that he usually is ok. no fever / chills, no neck stiffness. no weakness to legs. no back pain. no abdominal pain or uti sxs. no cough or sob. notably - documented exposure covid 19.

## 2022-01-28 NOTE — ED PROVIDER NOTE - NSICDXFAMILYHX_GEN_ALL_CORE_FT
HPI:    Patient ID: Tammie Garces is a 28year old male. HPI  Patient presents with:  Knee Pain: c/o right knee pain off and on for about 6 months  Shoulder Pain: c/o right shoulder pain  No inury  Review of Systems   Constitutional: Negative.     Musculos FAMILY HISTORY:  Sibling  Still living? Unknown  Family history of ovarian cancer, Age at diagnosis: Age Unknown

## 2022-01-28 NOTE — H&P ADULT - PROBLEM SELECTOR PLAN 1
Presented with AMS  hepatic encephalopathy vs covid encephalopathy vs metabolic  Serum ammonia: 30  Electrolytes wnl  CT head: No acute intracranial findings.  Covid + since 1/10/22  Continue to monitor Presented with AMS  hepatic encephalopathy vs covid encephalopathy vs metabolic vs infectious   Serum ammonia: 30  Electrolytes wnl  CT head: No acute intracranial findings.  Ua+  Follow urine culture  Covid + since 1/10/22  Started on ceftriaxone   Continue to monitor

## 2022-01-28 NOTE — H&P ADULT - PROBLEM SELECTOR PLAN 4
takes propranolol 10, lasix 20, lactulose  c/w home meds.  Hold lactulose for >3BM Left shoulder and hip pain   HX cervical spinal stenosis, underwent decompression 7/2021.   PT consulted Left shoulder and hip pain   Follow official read on Lt shoulder and hip xray  HX cervical spinal stenosis, underwent decompression 7/2021.   PT consulted

## 2022-01-28 NOTE — H&P ADULT - PROBLEM SELECTOR PLAN 5
BUN/Cr: 25/1  Avoid nephrotoxins   Follow BMP takes propranolol 10, lasix 20, lactulose  c/w home meds.  Hold lactulose for >3BM

## 2022-01-29 DIAGNOSIS — N39.0 URINARY TRACT INFECTION, SITE NOT SPECIFIED: ICD-10-CM

## 2022-01-29 LAB
ALBUMIN SERPL ELPH-MCNC: 1.8 G/DL — LOW (ref 3.5–5)
ALP SERPL-CCNC: 342 U/L — HIGH (ref 40–120)
ALT FLD-CCNC: 22 U/L DA — SIGNIFICANT CHANGE UP (ref 10–60)
ANION GAP SERPL CALC-SCNC: 7 MMOL/L — SIGNIFICANT CHANGE UP (ref 5–17)
APPEARANCE UR: CLEAR — SIGNIFICANT CHANGE UP
AST SERPL-CCNC: 32 U/L — SIGNIFICANT CHANGE UP (ref 10–40)
BACTERIA # UR AUTO: ABNORMAL /HPF
BILIRUB SERPL-MCNC: 1.1 MG/DL — SIGNIFICANT CHANGE UP (ref 0.2–1.2)
BILIRUB UR-MCNC: NEGATIVE — SIGNIFICANT CHANGE UP
BUN SERPL-MCNC: 28 MG/DL — HIGH (ref 7–18)
CALCIUM SERPL-MCNC: 9.1 MG/DL — SIGNIFICANT CHANGE UP (ref 8.4–10.5)
CHLORIDE SERPL-SCNC: 105 MMOL/L — SIGNIFICANT CHANGE UP (ref 96–108)
CO2 SERPL-SCNC: 28 MMOL/L — SIGNIFICANT CHANGE UP (ref 22–31)
COLOR SPEC: YELLOW — SIGNIFICANT CHANGE UP
CREAT SERPL-MCNC: 1.05 MG/DL — SIGNIFICANT CHANGE UP (ref 0.5–1.3)
D DIMER BLD IA.RAPID-MCNC: 552 NG/ML DDU — HIGH
DIFF PNL FLD: NEGATIVE — SIGNIFICANT CHANGE UP
EPI CELLS # UR: ABNORMAL /HPF
GLUCOSE SERPL-MCNC: 164 MG/DL — HIGH (ref 70–99)
GLUCOSE UR QL: NEGATIVE — SIGNIFICANT CHANGE UP
HCT VFR BLD CALC: 36.1 % — LOW (ref 39–50)
HGB BLD-MCNC: 11.4 G/DL — LOW (ref 13–17)
KETONES UR-MCNC: NEGATIVE — SIGNIFICANT CHANGE UP
LEUKOCYTE ESTERASE UR-ACNC: ABNORMAL
MAGNESIUM SERPL-MCNC: 2.3 MG/DL — SIGNIFICANT CHANGE UP (ref 1.6–2.6)
MCHC RBC-ENTMCNC: 25.1 PG — LOW (ref 27–34)
MCHC RBC-ENTMCNC: 31.6 GM/DL — LOW (ref 32–36)
MCV RBC AUTO: 79.5 FL — LOW (ref 80–100)
NITRITE UR-MCNC: NEGATIVE — SIGNIFICANT CHANGE UP
NRBC # BLD: 0 /100 WBCS — SIGNIFICANT CHANGE UP (ref 0–0)
PH UR: 6 — SIGNIFICANT CHANGE UP (ref 5–8)
PHOSPHATE SERPL-MCNC: 4 MG/DL — SIGNIFICANT CHANGE UP (ref 2.5–4.5)
PLATELET # BLD AUTO: 205 K/UL — SIGNIFICANT CHANGE UP (ref 150–400)
POTASSIUM SERPL-MCNC: 4.4 MMOL/L — SIGNIFICANT CHANGE UP (ref 3.5–5.3)
POTASSIUM SERPL-SCNC: 4.4 MMOL/L — SIGNIFICANT CHANGE UP (ref 3.5–5.3)
PROCALCITONIN SERPL-MCNC: 0.29 NG/ML — HIGH (ref 0.02–0.1)
PROT SERPL-MCNC: 6.7 G/DL — SIGNIFICANT CHANGE UP (ref 6–8.3)
PROT UR-MCNC: 15
RBC # BLD: 4.54 M/UL — SIGNIFICANT CHANGE UP (ref 4.2–5.8)
RBC # FLD: 18.6 % — HIGH (ref 10.3–14.5)
RBC CASTS # UR COMP ASSIST: SIGNIFICANT CHANGE UP /HPF (ref 0–2)
SODIUM SERPL-SCNC: 140 MMOL/L — SIGNIFICANT CHANGE UP (ref 135–145)
SP GR SPEC: 1.01 — SIGNIFICANT CHANGE UP (ref 1.01–1.02)
UROBILINOGEN FLD QL: NEGATIVE — SIGNIFICANT CHANGE UP
WBC # BLD: 6.11 K/UL — SIGNIFICANT CHANGE UP (ref 3.8–10.5)
WBC # FLD AUTO: 6.11 K/UL — SIGNIFICANT CHANGE UP (ref 3.8–10.5)
WBC UR QL: ABNORMAL /HPF (ref 0–5)

## 2022-01-29 PROCEDURE — 73030 X-RAY EXAM OF SHOULDER: CPT | Mod: 26,LT

## 2022-01-29 RX ORDER — CEFTRIAXONE 500 MG/1
1000 INJECTION, POWDER, FOR SOLUTION INTRAMUSCULAR; INTRAVENOUS EVERY 24 HOURS
Refills: 0 | Status: COMPLETED | OUTPATIENT
Start: 2022-01-29 | End: 2022-01-31

## 2022-01-29 RX ORDER — ENOXAPARIN SODIUM 100 MG/ML
60 INJECTION SUBCUTANEOUS
Refills: 0 | Status: DISCONTINUED | OUTPATIENT
Start: 2022-01-29 | End: 2022-02-03

## 2022-01-29 RX ADMIN — Medication 20 MILLIGRAM(S): at 06:13

## 2022-01-29 RX ADMIN — LACTULOSE 10 GRAM(S): 10 SOLUTION ORAL at 18:30

## 2022-01-29 RX ADMIN — NYSTATIN CREAM 1 APPLICATION(S): 100000 CREAM TOPICAL at 06:14

## 2022-01-29 RX ADMIN — Medication 100 MILLIGRAM(S): at 12:43

## 2022-01-29 RX ADMIN — POLYETHYLENE GLYCOL 3350 17 GRAM(S): 17 POWDER, FOR SOLUTION ORAL at 12:42

## 2022-01-29 RX ADMIN — ENOXAPARIN SODIUM 60 MILLIGRAM(S): 100 INJECTION SUBCUTANEOUS at 06:13

## 2022-01-29 RX ADMIN — NYSTATIN CREAM 1 APPLICATION(S): 100000 CREAM TOPICAL at 13:09

## 2022-01-29 RX ADMIN — NYSTATIN CREAM 1 APPLICATION(S): 100000 CREAM TOPICAL at 21:50

## 2022-01-29 RX ADMIN — CEFTRIAXONE 100 MILLIGRAM(S): 500 INJECTION, POWDER, FOR SOLUTION INTRAMUSCULAR; INTRAVENOUS at 12:43

## 2022-01-29 RX ADMIN — LACTULOSE 10 GRAM(S): 10 SOLUTION ORAL at 06:13

## 2022-01-29 RX ADMIN — PREGABALIN 1000 MICROGRAM(S): 225 CAPSULE ORAL at 12:43

## 2022-01-29 RX ADMIN — Medication 1 TABLET(S): at 12:43

## 2022-01-29 RX ADMIN — PANTOPRAZOLE SODIUM 40 MILLIGRAM(S): 20 TABLET, DELAYED RELEASE ORAL at 06:13

## 2022-01-29 RX ADMIN — Medication 6 MILLIGRAM(S): at 06:13

## 2022-01-29 RX ADMIN — ENOXAPARIN SODIUM 60 MILLIGRAM(S): 100 INJECTION SUBCUTANEOUS at 18:31

## 2022-01-29 NOTE — PATIENT PROFILE ADULT - IS THERE A SUSPICION OF ABUSE/NEGLIGENCE?
[facility]  [address]  [facility] [address]  Phone: [phone] May 18, 2017        Fredo Coleman MD  [address] [facility] [place] [address]    Patient: Meme Yang   MR Number: [id number]   YOB: 1961   Date of Visit: 5/18/2017     Dear Dr. Coleman:    I had the pleasure of seeing your patient Ms. Meme Yang in clinic today. As you know, she is a pleasant 55-year-old woman who underwent surgical aortic valve replacement several years ago.  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  I saw her today in clinic and discussed her situation with her and her sister in detail.  She came to clinic in a wheelchair, supported by a seatbelt.  She reports that she has essentially been nonambulatory for at least two months.  She also uses home oxygen due to her history of COPD.  Given her comorbid conditions, I think she would be high-risk for surgery.  Transcatheter valve may be the best option for her.  We will review her situation in our Multidisciplinary Valve Clinic, and hopefully can determine something we can do to help her.    Thank you for sending her to me.    Sincerely,        Camron Gomez MD   Chief, Division of Thoracic & Cardiovascular Surgery  [facility]    PEP/ecs     
May 18, 2017      Fredo Coleman MD  [address] [facility] [place] [address]           [facility] Cardi[facility]  [address]  [facility] [address]  Phone: [phone]          Patient: Meme Yang   MR Number: [id number]   YOB: 1961   Date of Visit: 5/18/2017       Dear Dr. Fredo Coleman:    Thank you for referring Meme Yang to me for evaluation. Attached you will find relevant portions of my assessment and plan of care.    If you have questions, please do not hesitate to call me. I look forward to following Meme Yang along with you.    Sincerely,    Iglesia Cortez MD    Enclosure  CC:  No Recipients    If you would like to receive this communication electronically, please contact [email or web address] or [phone] to request more information on [employer] access.    For providers and/or their staff who would like to refer a patient to [facility], please contact us through our one-stop-shop provider referral line, [facility] , at [phone].    If you feel you have received this communication in error or would no longer like to receive these types of communications, please e-mail [email or web address]         
no

## 2022-01-29 NOTE — PATIENT PROFILE ADULT - FALL HARM RISK - HARM RISK INTERVENTIONS

## 2022-01-30 LAB
ALBUMIN SERPL ELPH-MCNC: 1.8 G/DL — LOW (ref 3.5–5)
ALP SERPL-CCNC: 325 U/L — HIGH (ref 40–120)
ALT FLD-CCNC: 22 U/L DA — SIGNIFICANT CHANGE UP (ref 10–60)
ANION GAP SERPL CALC-SCNC: 6 MMOL/L — SIGNIFICANT CHANGE UP (ref 5–17)
AST SERPL-CCNC: 31 U/L — SIGNIFICANT CHANGE UP (ref 10–40)
BILIRUB SERPL-MCNC: 0.4 MG/DL — SIGNIFICANT CHANGE UP (ref 0.2–1.2)
BUN SERPL-MCNC: 42 MG/DL — HIGH (ref 7–18)
CALCIUM SERPL-MCNC: 9.5 MG/DL — SIGNIFICANT CHANGE UP (ref 8.4–10.5)
CHLORIDE SERPL-SCNC: 104 MMOL/L — SIGNIFICANT CHANGE UP (ref 96–108)
CO2 SERPL-SCNC: 31 MMOL/L — SIGNIFICANT CHANGE UP (ref 22–31)
CREAT SERPL-MCNC: 1.11 MG/DL — SIGNIFICANT CHANGE UP (ref 0.5–1.3)
GLUCOSE SERPL-MCNC: 132 MG/DL — HIGH (ref 70–99)
HCT VFR BLD CALC: 36.1 % — LOW (ref 39–50)
HGB BLD-MCNC: 11.6 G/DL — LOW (ref 13–17)
MAGNESIUM SERPL-MCNC: 2.3 MG/DL — SIGNIFICANT CHANGE UP (ref 1.6–2.6)
MCHC RBC-ENTMCNC: 25.3 PG — LOW (ref 27–34)
MCHC RBC-ENTMCNC: 32.1 GM/DL — SIGNIFICANT CHANGE UP (ref 32–36)
MCV RBC AUTO: 78.8 FL — LOW (ref 80–100)
NRBC # BLD: 0 /100 WBCS — SIGNIFICANT CHANGE UP (ref 0–0)
PHOSPHATE SERPL-MCNC: 2.2 MG/DL — LOW (ref 2.5–4.5)
PLATELET # BLD AUTO: 275 K/UL — SIGNIFICANT CHANGE UP (ref 150–400)
POTASSIUM SERPL-MCNC: 4.6 MMOL/L — SIGNIFICANT CHANGE UP (ref 3.5–5.3)
POTASSIUM SERPL-SCNC: 4.6 MMOL/L — SIGNIFICANT CHANGE UP (ref 3.5–5.3)
PROT SERPL-MCNC: 6.9 G/DL — SIGNIFICANT CHANGE UP (ref 6–8.3)
RBC # BLD: 4.58 M/UL — SIGNIFICANT CHANGE UP (ref 4.2–5.8)
RBC # FLD: 18.2 % — HIGH (ref 10.3–14.5)
SODIUM SERPL-SCNC: 141 MMOL/L — SIGNIFICANT CHANGE UP (ref 135–145)
WBC # BLD: 9.26 K/UL — SIGNIFICANT CHANGE UP (ref 3.8–10.5)
WBC # FLD AUTO: 9.26 K/UL — SIGNIFICANT CHANGE UP (ref 3.8–10.5)

## 2022-01-30 RX ORDER — SODIUM,POTASSIUM PHOSPHATES 278-250MG
1 POWDER IN PACKET (EA) ORAL ONCE
Refills: 0 | Status: COMPLETED | OUTPATIENT
Start: 2022-01-30 | End: 2022-01-30

## 2022-01-30 RX ADMIN — NYSTATIN CREAM 1 APPLICATION(S): 100000 CREAM TOPICAL at 15:17

## 2022-01-30 RX ADMIN — NYSTATIN CREAM 1 APPLICATION(S): 100000 CREAM TOPICAL at 22:12

## 2022-01-30 RX ADMIN — LACTULOSE 10 GRAM(S): 10 SOLUTION ORAL at 06:08

## 2022-01-30 RX ADMIN — CEFTRIAXONE 100 MILLIGRAM(S): 500 INJECTION, POWDER, FOR SOLUTION INTRAMUSCULAR; INTRAVENOUS at 12:33

## 2022-01-30 RX ADMIN — NYSTATIN CREAM 1 APPLICATION(S): 100000 CREAM TOPICAL at 06:08

## 2022-01-30 RX ADMIN — Medication 6 MILLIGRAM(S): at 06:08

## 2022-01-30 RX ADMIN — Medication 1 TABLET(S): at 12:34

## 2022-01-30 RX ADMIN — POLYETHYLENE GLYCOL 3350 17 GRAM(S): 17 POWDER, FOR SOLUTION ORAL at 12:33

## 2022-01-30 RX ADMIN — Medication 1 PACKET(S): at 18:56

## 2022-01-30 RX ADMIN — Medication 100 MILLIGRAM(S): at 12:33

## 2022-01-30 RX ADMIN — ENOXAPARIN SODIUM 60 MILLIGRAM(S): 100 INJECTION SUBCUTANEOUS at 17:24

## 2022-01-30 RX ADMIN — LACTULOSE 10 GRAM(S): 10 SOLUTION ORAL at 17:38

## 2022-01-30 RX ADMIN — ENOXAPARIN SODIUM 60 MILLIGRAM(S): 100 INJECTION SUBCUTANEOUS at 06:08

## 2022-01-30 RX ADMIN — PREGABALIN 1000 MICROGRAM(S): 225 CAPSULE ORAL at 12:33

## 2022-01-30 RX ADMIN — PANTOPRAZOLE SODIUM 40 MILLIGRAM(S): 20 TABLET, DELAYED RELEASE ORAL at 06:08

## 2022-01-31 LAB
ALBUMIN SERPL ELPH-MCNC: 1.7 G/DL — LOW (ref 3.5–5)
ALP SERPL-CCNC: 280 U/L — HIGH (ref 40–120)
ALT FLD-CCNC: 28 U/L DA — SIGNIFICANT CHANGE UP (ref 10–60)
ANION GAP SERPL CALC-SCNC: 5 MMOL/L — SIGNIFICANT CHANGE UP (ref 5–17)
AST SERPL-CCNC: 37 U/L — SIGNIFICANT CHANGE UP (ref 10–40)
BILIRUB SERPL-MCNC: 0.3 MG/DL — SIGNIFICANT CHANGE UP (ref 0.2–1.2)
BUN SERPL-MCNC: 33 MG/DL — HIGH (ref 7–18)
CALCIUM SERPL-MCNC: 9 MG/DL — SIGNIFICANT CHANGE UP (ref 8.4–10.5)
CHLORIDE SERPL-SCNC: 103 MMOL/L — SIGNIFICANT CHANGE UP (ref 96–108)
CO2 SERPL-SCNC: 29 MMOL/L — SIGNIFICANT CHANGE UP (ref 22–31)
CREAT SERPL-MCNC: 0.91 MG/DL — SIGNIFICANT CHANGE UP (ref 0.5–1.3)
D DIMER BLD IA.RAPID-MCNC: 345 NG/ML DDU — HIGH
FERRITIN SERPL-MCNC: 120 NG/ML — SIGNIFICANT CHANGE UP (ref 30–400)
GLUCOSE SERPL-MCNC: 126 MG/DL — HIGH (ref 70–99)
HCT VFR BLD CALC: 33.9 % — LOW (ref 39–50)
HGB BLD-MCNC: 11.2 G/DL — LOW (ref 13–17)
LDH SERPL L TO P-CCNC: 146 U/L — SIGNIFICANT CHANGE UP (ref 120–225)
MAGNESIUM SERPL-MCNC: 2.4 MG/DL — SIGNIFICANT CHANGE UP (ref 1.6–2.6)
MCHC RBC-ENTMCNC: 25.7 PG — LOW (ref 27–34)
MCHC RBC-ENTMCNC: 33 GM/DL — SIGNIFICANT CHANGE UP (ref 32–36)
MCV RBC AUTO: 77.8 FL — LOW (ref 80–100)
NRBC # BLD: 0 /100 WBCS — SIGNIFICANT CHANGE UP (ref 0–0)
PHOSPHATE SERPL-MCNC: 3 MG/DL — SIGNIFICANT CHANGE UP (ref 2.5–4.5)
PLATELET # BLD AUTO: 220 K/UL — SIGNIFICANT CHANGE UP (ref 150–400)
POTASSIUM SERPL-MCNC: 4.7 MMOL/L — SIGNIFICANT CHANGE UP (ref 3.5–5.3)
POTASSIUM SERPL-SCNC: 4.7 MMOL/L — SIGNIFICANT CHANGE UP (ref 3.5–5.3)
PROCALCITONIN SERPL-MCNC: 0.16 NG/ML — HIGH (ref 0.02–0.1)
PROT SERPL-MCNC: 6 G/DL — SIGNIFICANT CHANGE UP (ref 6–8.3)
RBC # BLD: 4.36 M/UL — SIGNIFICANT CHANGE UP (ref 4.2–5.8)
RBC # FLD: 18.4 % — HIGH (ref 10.3–14.5)
SARS-COV-2 RNA SPEC QL NAA+PROBE: DETECTED
SODIUM SERPL-SCNC: 137 MMOL/L — SIGNIFICANT CHANGE UP (ref 135–145)
WBC # BLD: 6.84 K/UL — SIGNIFICANT CHANGE UP (ref 3.8–10.5)
WBC # FLD AUTO: 6.84 K/UL — SIGNIFICANT CHANGE UP (ref 3.8–10.5)

## 2022-01-31 RX ADMIN — NYSTATIN CREAM 1 APPLICATION(S): 100000 CREAM TOPICAL at 21:50

## 2022-01-31 RX ADMIN — Medication 20 MILLIGRAM(S): at 06:22

## 2022-01-31 RX ADMIN — ENOXAPARIN SODIUM 60 MILLIGRAM(S): 100 INJECTION SUBCUTANEOUS at 06:23

## 2022-01-31 RX ADMIN — LACTULOSE 10 GRAM(S): 10 SOLUTION ORAL at 06:23

## 2022-01-31 RX ADMIN — PANTOPRAZOLE SODIUM 40 MILLIGRAM(S): 20 TABLET, DELAYED RELEASE ORAL at 06:22

## 2022-01-31 RX ADMIN — LACTULOSE 10 GRAM(S): 10 SOLUTION ORAL at 17:38

## 2022-01-31 RX ADMIN — Medication 1 TABLET(S): at 12:21

## 2022-01-31 RX ADMIN — CEFTRIAXONE 100 MILLIGRAM(S): 500 INJECTION, POWDER, FOR SOLUTION INTRAMUSCULAR; INTRAVENOUS at 12:22

## 2022-01-31 RX ADMIN — Medication 6 MILLIGRAM(S): at 06:22

## 2022-01-31 RX ADMIN — PREGABALIN 1000 MICROGRAM(S): 225 CAPSULE ORAL at 12:21

## 2022-01-31 RX ADMIN — Medication 100 MILLIGRAM(S): at 12:22

## 2022-01-31 RX ADMIN — ENOXAPARIN SODIUM 60 MILLIGRAM(S): 100 INJECTION SUBCUTANEOUS at 17:38

## 2022-01-31 RX ADMIN — NYSTATIN CREAM 1 APPLICATION(S): 100000 CREAM TOPICAL at 13:37

## 2022-01-31 RX ADMIN — NYSTATIN CREAM 1 APPLICATION(S): 100000 CREAM TOPICAL at 06:22

## 2022-01-31 RX ADMIN — POLYETHYLENE GLYCOL 3350 17 GRAM(S): 17 POWDER, FOR SOLUTION ORAL at 12:21

## 2022-01-31 NOTE — ADVANCED PRACTICE NURSE CONSULT - RECOMMEDATIONS
-Clean all affected areas with warm water, mild soap, pat dry, and apply skin prep to the surrounding skin  -Frequent toileting  -Apply TRIAD Moisture Barrier Cream to the Perianal and Coccyx areas b.i.d. PRN  -Elevate/float the patients heels using pillows and reposition the patient Q 2hrs using wedges or pillows

## 2022-01-31 NOTE — ADVANCED PRACTICE NURSE CONSULT - ASSESSMENT
This is a 81yr old male patient admitted for ROBINSON-COV-2, presenting with the following:  -There is evidence of healed wounds to the Bilateral Gluteus   -There is a Stage 1 Pressure Injury to the Coccyx, as evident by non-blanchable erythema

## 2022-02-01 LAB
-  AMIKACIN: SIGNIFICANT CHANGE UP
-  AMOXICILLIN/CLAVULANIC ACID: SIGNIFICANT CHANGE UP
-  AMPICILLIN/SULBACTAM: SIGNIFICANT CHANGE UP
-  AMPICILLIN: SIGNIFICANT CHANGE UP
-  AZTREONAM: SIGNIFICANT CHANGE UP
-  CEFAZOLIN: SIGNIFICANT CHANGE UP
-  CEFEPIME: SIGNIFICANT CHANGE UP
-  CEFOXITIN: SIGNIFICANT CHANGE UP
-  CEFTRIAXONE: SIGNIFICANT CHANGE UP
-  CIPROFLOXACIN: SIGNIFICANT CHANGE UP
-  ERTAPENEM: SIGNIFICANT CHANGE UP
-  GENTAMICIN: SIGNIFICANT CHANGE UP
-  IMIPENEM: SIGNIFICANT CHANGE UP
-  LEVOFLOXACIN: SIGNIFICANT CHANGE UP
-  MEROPENEM: SIGNIFICANT CHANGE UP
-  NITROFURANTOIN: SIGNIFICANT CHANGE UP
-  PIPERACILLIN/TAZOBACTAM: SIGNIFICANT CHANGE UP
-  TIGECYCLINE: SIGNIFICANT CHANGE UP
-  TOBRAMYCIN: SIGNIFICANT CHANGE UP
-  TRIMETHOPRIM/SULFAMETHOXAZOLE: SIGNIFICANT CHANGE UP
ALBUMIN SERPL ELPH-MCNC: 2 G/DL — LOW (ref 3.5–5)
ALP SERPL-CCNC: 320 U/L — HIGH (ref 40–120)
ALT FLD-CCNC: 48 U/L DA — SIGNIFICANT CHANGE UP (ref 10–60)
ANION GAP SERPL CALC-SCNC: 7 MMOL/L — SIGNIFICANT CHANGE UP (ref 5–17)
AST SERPL-CCNC: 58 U/L — HIGH (ref 10–40)
BILIRUB SERPL-MCNC: 0.4 MG/DL — SIGNIFICANT CHANGE UP (ref 0.2–1.2)
BUN SERPL-MCNC: 32 MG/DL — HIGH (ref 7–18)
CALCIUM SERPL-MCNC: 9.3 MG/DL — SIGNIFICANT CHANGE UP (ref 8.4–10.5)
CHLORIDE SERPL-SCNC: 99 MMOL/L — SIGNIFICANT CHANGE UP (ref 96–108)
CO2 SERPL-SCNC: 31 MMOL/L — SIGNIFICANT CHANGE UP (ref 22–31)
CREAT SERPL-MCNC: 1.01 MG/DL — SIGNIFICANT CHANGE UP (ref 0.5–1.3)
CULTURE RESULTS: SIGNIFICANT CHANGE UP
GLUCOSE SERPL-MCNC: 134 MG/DL — HIGH (ref 70–99)
HCT VFR BLD CALC: 37.8 % — LOW (ref 39–50)
HGB BLD-MCNC: 12.2 G/DL — LOW (ref 13–17)
MCHC RBC-ENTMCNC: 25.5 PG — LOW (ref 27–34)
MCHC RBC-ENTMCNC: 32.3 GM/DL — SIGNIFICANT CHANGE UP (ref 32–36)
MCV RBC AUTO: 79.1 FL — LOW (ref 80–100)
METHOD TYPE: SIGNIFICANT CHANGE UP
NRBC # BLD: 0 /100 WBCS — SIGNIFICANT CHANGE UP (ref 0–0)
ORGANISM # SPEC MICROSCOPIC CNT: SIGNIFICANT CHANGE UP
ORGANISM # SPEC MICROSCOPIC CNT: SIGNIFICANT CHANGE UP
PLATELET # BLD AUTO: 262 K/UL — SIGNIFICANT CHANGE UP (ref 150–400)
POTASSIUM SERPL-MCNC: 4.4 MMOL/L — SIGNIFICANT CHANGE UP (ref 3.5–5.3)
POTASSIUM SERPL-SCNC: 4.4 MMOL/L — SIGNIFICANT CHANGE UP (ref 3.5–5.3)
PROT SERPL-MCNC: 6.7 G/DL — SIGNIFICANT CHANGE UP (ref 6–8.3)
RBC # BLD: 4.78 M/UL — SIGNIFICANT CHANGE UP (ref 4.2–5.8)
RBC # FLD: 18.8 % — HIGH (ref 10.3–14.5)
SODIUM SERPL-SCNC: 137 MMOL/L — SIGNIFICANT CHANGE UP (ref 135–145)
SPECIMEN SOURCE: SIGNIFICANT CHANGE UP
WBC # BLD: 7.66 K/UL — SIGNIFICANT CHANGE UP (ref 3.8–10.5)
WBC # FLD AUTO: 7.66 K/UL — SIGNIFICANT CHANGE UP (ref 3.8–10.5)

## 2022-02-01 RX ADMIN — LACTULOSE 10 GRAM(S): 10 SOLUTION ORAL at 05:56

## 2022-02-01 RX ADMIN — NYSTATIN CREAM 1 APPLICATION(S): 100000 CREAM TOPICAL at 05:57

## 2022-02-01 RX ADMIN — NYSTATIN CREAM 1 APPLICATION(S): 100000 CREAM TOPICAL at 13:18

## 2022-02-01 RX ADMIN — NYSTATIN CREAM 1 APPLICATION(S): 100000 CREAM TOPICAL at 23:21

## 2022-02-01 RX ADMIN — Medication 1 TABLET(S): at 12:38

## 2022-02-01 RX ADMIN — ENOXAPARIN SODIUM 60 MILLIGRAM(S): 100 INJECTION SUBCUTANEOUS at 17:25

## 2022-02-01 RX ADMIN — ENOXAPARIN SODIUM 60 MILLIGRAM(S): 100 INJECTION SUBCUTANEOUS at 05:56

## 2022-02-01 RX ADMIN — PREGABALIN 1000 MICROGRAM(S): 225 CAPSULE ORAL at 12:38

## 2022-02-01 RX ADMIN — Medication 20 MILLIGRAM(S): at 05:57

## 2022-02-01 RX ADMIN — Medication 100 MILLIGRAM(S): at 12:38

## 2022-02-01 RX ADMIN — PANTOPRAZOLE SODIUM 40 MILLIGRAM(S): 20 TABLET, DELAYED RELEASE ORAL at 06:05

## 2022-02-01 RX ADMIN — LACTULOSE 10 GRAM(S): 10 SOLUTION ORAL at 17:25

## 2022-02-01 RX ADMIN — POLYETHYLENE GLYCOL 3350 17 GRAM(S): 17 POWDER, FOR SOLUTION ORAL at 12:43

## 2022-02-01 RX ADMIN — Medication 6 MILLIGRAM(S): at 05:56

## 2022-02-01 NOTE — PHYSICAL THERAPY INITIAL EVALUATION ADULT - DIAGNOSIS, PT EVAL
Pt is experiencing generalized weakness and diminished aerobic capacity in comparison to functional baseline

## 2022-02-01 NOTE — PHYSICAL THERAPY INITIAL EVALUATION ADULT - MANUAL MUSCLE TESTING RESULTS, REHAB EVAL
bilateral UE strength grossly presents 4-/5; bilateral LE grossly presents 3-/5/grossly assessed due to

## 2022-02-01 NOTE — PHYSICAL THERAPY INITIAL EVALUATION ADULT - PERTINENT HX OF CURRENT PROBLEM, REHAB EVAL
Pt was admitted with AMS and Covid-19 diagnosis. 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

## 2022-02-01 NOTE — PHYSICAL THERAPY INITIAL EVALUATION ADULT - ADDITIONAL COMMENTS
Pt was previously admitted from a skilled nursing facility, non-ambulatory for months as per wife and required assistance for ADL's

## 2022-02-01 NOTE — PHYSICAL THERAPY INITIAL EVALUATION ADULT - GENERAL OBSERVATIONS, REHAB EVAL
Pt was received supine in bed NAD on room air. Pt is A&Ox2, confused but alert and cooperative. Peripheral IV in place.

## 2022-02-02 ENCOUNTER — TRANSCRIPTION ENCOUNTER (OUTPATIENT)
Age: 82
End: 2022-02-02

## 2022-02-02 DIAGNOSIS — Z02.9 ENCOUNTER FOR ADMINISTRATIVE EXAMINATIONS, UNSPECIFIED: ICD-10-CM

## 2022-02-02 LAB
ALBUMIN SERPL ELPH-MCNC: 1.7 G/DL — LOW (ref 3.5–5)
ALP SERPL-CCNC: 256 U/L — HIGH (ref 40–120)
ALT FLD-CCNC: 47 U/L DA — SIGNIFICANT CHANGE UP (ref 10–60)
ANION GAP SERPL CALC-SCNC: 8 MMOL/L — SIGNIFICANT CHANGE UP (ref 5–17)
AST SERPL-CCNC: 50 U/L — HIGH (ref 10–40)
BILIRUB SERPL-MCNC: 0.4 MG/DL — SIGNIFICANT CHANGE UP (ref 0.2–1.2)
BUN SERPL-MCNC: 31 MG/DL — HIGH (ref 7–18)
CALCIUM SERPL-MCNC: 8.6 MG/DL — SIGNIFICANT CHANGE UP (ref 8.4–10.5)
CHLORIDE SERPL-SCNC: 102 MMOL/L — SIGNIFICANT CHANGE UP (ref 96–108)
CO2 SERPL-SCNC: 29 MMOL/L — SIGNIFICANT CHANGE UP (ref 22–31)
CREAT SERPL-MCNC: 0.95 MG/DL — SIGNIFICANT CHANGE UP (ref 0.5–1.3)
GLUCOSE SERPL-MCNC: 93 MG/DL — SIGNIFICANT CHANGE UP (ref 70–99)
HCT VFR BLD CALC: 32.9 % — LOW (ref 39–50)
HGB BLD-MCNC: 10.6 G/DL — LOW (ref 13–17)
MCHC RBC-ENTMCNC: 25.4 PG — LOW (ref 27–34)
MCHC RBC-ENTMCNC: 32.2 GM/DL — SIGNIFICANT CHANGE UP (ref 32–36)
MCV RBC AUTO: 78.9 FL — LOW (ref 80–100)
NRBC # BLD: 0 /100 WBCS — SIGNIFICANT CHANGE UP (ref 0–0)
PLATELET # BLD AUTO: 217 K/UL — SIGNIFICANT CHANGE UP (ref 150–400)
POTASSIUM SERPL-MCNC: 4.3 MMOL/L — SIGNIFICANT CHANGE UP (ref 3.5–5.3)
POTASSIUM SERPL-SCNC: 4.3 MMOL/L — SIGNIFICANT CHANGE UP (ref 3.5–5.3)
PROT SERPL-MCNC: 5.5 G/DL — LOW (ref 6–8.3)
RBC # BLD: 4.17 M/UL — LOW (ref 4.2–5.8)
RBC # FLD: 18.3 % — HIGH (ref 10.3–14.5)
SODIUM SERPL-SCNC: 139 MMOL/L — SIGNIFICANT CHANGE UP (ref 135–145)
WBC # BLD: 7.96 K/UL — SIGNIFICANT CHANGE UP (ref 3.8–10.5)
WBC # FLD AUTO: 7.96 K/UL — SIGNIFICANT CHANGE UP (ref 3.8–10.5)

## 2022-02-02 RX ADMIN — Medication 1 TABLET(S): at 18:36

## 2022-02-02 RX ADMIN — LACTULOSE 10 GRAM(S): 10 SOLUTION ORAL at 18:36

## 2022-02-02 RX ADMIN — Medication 6 MILLIGRAM(S): at 06:42

## 2022-02-02 RX ADMIN — NYSTATIN CREAM 1 APPLICATION(S): 100000 CREAM TOPICAL at 13:08

## 2022-02-02 RX ADMIN — ENOXAPARIN SODIUM 60 MILLIGRAM(S): 100 INJECTION SUBCUTANEOUS at 06:42

## 2022-02-02 RX ADMIN — NYSTATIN CREAM 1 APPLICATION(S): 100000 CREAM TOPICAL at 23:12

## 2022-02-02 RX ADMIN — Medication 20 MILLIGRAM(S): at 06:42

## 2022-02-02 RX ADMIN — PREGABALIN 1000 MICROGRAM(S): 225 CAPSULE ORAL at 13:07

## 2022-02-02 RX ADMIN — PANTOPRAZOLE SODIUM 40 MILLIGRAM(S): 20 TABLET, DELAYED RELEASE ORAL at 06:43

## 2022-02-02 RX ADMIN — LACTULOSE 10 GRAM(S): 10 SOLUTION ORAL at 06:43

## 2022-02-02 RX ADMIN — ENOXAPARIN SODIUM 60 MILLIGRAM(S): 100 INJECTION SUBCUTANEOUS at 18:36

## 2022-02-02 RX ADMIN — NYSTATIN CREAM 1 APPLICATION(S): 100000 CREAM TOPICAL at 06:42

## 2022-02-02 RX ADMIN — Medication 100 MILLIGRAM(S): at 13:07

## 2022-02-02 RX ADMIN — Medication 1 TABLET(S): at 13:08

## 2022-02-02 NOTE — DIETITIAN INITIAL EVALUATION ADULT. - SIGNS/SYMPTOMS
Severe muscle wasting, fluid accumulation (ascites). Poor oral intake w/19.6% wt. loss >1yr, severe muscle/temporal wasting, ongoing weakness & PCM

## 2022-02-02 NOTE — DIETITIAN INITIAL EVALUATION ADULT. - PERTINENT LABORATORY DATA
02-02 Na139 mmol/L Glu 93 mg/dL K+ 4.3 mmol/L Cr  0.95 mg/dL BUN 31 mg/dL<H>   01-31 Phos 3.0 mg/dL   02-02 Alb 1.7 g/dL<L>

## 2022-02-02 NOTE — DIETITIAN INITIAL EVALUATION ADULT. - HEIGHT FOR BMI (FEET)
ED Fall HPI





- General


Chief Complaint: Fall


Stated Complaint: KNEE/HAND/FOOT PAIN


Time Seen by Provider: 09/27/18 18:16


Source: patient, EMS


Mode of arrival: Wheelchair





- History of Present Illness


Initial Comments: 





This is a 50-year-old female nontoxic, well nourished in appearance, no acute 

signs of distress presents to the ED with c/o of right knee, foot, and hand 

pain .  Patient stated that she tripped and fell today in a store.  Patient 

denies any other trauma. Patient denies any head or neck trauma.  Patient 

denies any back pain or neck pain. Patient denies any numbness, tingling, fever

, chills, nausea, vomiting, chest pain, shortness of breath, headache, stiff 

neck.  Patient denies any joint swelling or joint redness.  Patient denies 

decreased range of motion.  Patient stated has decreased gait due to pain.  

Patient stated allergies to Marcaine and propofol. PMH includes kidney stones.


MD Complaint: fall


-: This evening


Fall From: standing


Place Fall Occurred: other


Loss of Consciousness: none


Prolonged Down Time?: no


Symptoms Prior to Fall: none


Severity: mild


Severity scale (0 -10): 8


Quality: aching


Context: tripped/slipped


Associated Symptoms: denies: headache, neck pain, numbness, weakness, chest 

paint, shortness of breath, abdominal pain, hematuria, unable to walk, 

lightheaded, vertigo, confusion





- Related Data


 Previous Rx's











 Medication  Instructions  Recorded  Last Taken  Type


 


Ibuprofen [Motrin] 600 mg PO Q8H PRN #30 tablet 09/27/18 Unknown Rx











 Allergies











Allergy/AdvReac Type Severity Reaction Status Date / Time


 


bupivacaine HCl Allergy  Seizure Verified 04/01/16 11:03





[From Marcaine]     


 


propofol [From Diprivan] Allergy  Hives Verified 04/01/16 11:04














ED Review of Systems


ROS: 


Stated complaint: KNEE/HAND/FOOT PAIN


Other details as noted in HPI





Constitutional: denies: chills, fever


Eyes: denies: eye pain, eye discharge, vision change


ENT: denies: ear pain, throat pain


Respiratory: denies: cough, shortness of breath, wheezing


Cardiovascular: denies: chest pain, palpitations


Endocrine: no symptoms reported


Gastrointestinal: denies: abdominal pain, nausea, diarrhea


Genitourinary: denies: urgency, dysuria, discharge


Musculoskeletal: arthralgia.  denies: back pain, joint swelling


Skin: denies: rash, lesions


Neurological: denies: headache, weakness, paresthesias


Psychiatric: denies: anxiety, depression


Hematological/Lymphatic: denies: easy bleeding, easy bruising





ED Past Medical Hx





- Past Medical History


Hx Kidney Stones: Yes





- Surgical History


Additional Surgical History: lithotripsy w/stent 2014, removed stent 2014





- Social History


Smoking Status: Never Smoker


Substance Use Type: None





- Medications


Home Medications: 


 Home Medications











 Medication  Instructions  Recorded  Confirmed  Last Taken  Type


 


Ibuprofen [Motrin] 600 mg PO Q8H PRN #30 tablet 09/27/18  Unknown Rx














ED Physical Exam





- General


Limitations: Physical Limitation


General appearance: alert, in no apparent distress





- Head


Head exam: Present: atraumatic, normocephalic





- Eye


Eye exam: Present: normal appearance


Pupils: Present: normal accommodation





- ENT


ENT exam: Present: normal exam, mucous membranes moist





- Neck


Neck exam: Present: normal inspection, full ROM.  Absent: tenderness, 

meningismus, lymphadenopathy





- Respiratory


Respiratory exam: Present: normal lung sounds bilaterally.  Absent: respiratory 

distress, wheezes, rales, rhonchi, stridor, chest wall tenderness, accessory 

muscle use, decreased breath sounds, prolonged expiratory





- Cardiovascular


Cardiovascular Exam: Present: regular rate, normal rhythm, normal heart sounds.

  Absent: bradycardia, tachycardia, irregular rhythm, systolic murmur, 

diastolic murmur, rubs, gallop





- GI/Abdominal


GI/Abdominal exam: Present: soft, normal bowel sounds.  Absent: distended, 

tenderness, guarding, rebound, rigid, diminished bowel sounds





- Rectal


Rectal exam: Present: deferred





- Extremities Exam


Extremities exam: Present: normal inspection, full ROM, tenderness, normal 

capillary refill.  Absent: joint swelling, calf tenderness





- Expanded Upper Extremity Exam


  ** Right


General: Present: normal inspection


Shoulder Exam: Present: normal inspection, full ROM.  Absent: tenderness, 

swelling


Upper Arm exam: Present: normal inspection, full ROM.  Absent: tenderness, 

swelling


Elbow exam: Present: normal inspection, full ROM.  Absent: tenderness, swelling


Forearm Wrist exam: Present: normal inspection, full ROM.  Absent: tenderness, 

swelling


Hand Wrist exam: Present: normal inspection, full ROM, tenderness.  Absent: 

swelling, abrasion, laceration, ecchymosis, deformity, crepidus, dislocation, 

erythema, amputation, nail avulsion, subungual hematoma


Neuro motor exam: Present: wrist extension intact, thumb opposition intact, 

thumb IP flexion intact, thumb adduction intact, fingers 2-5 abduction intact


Neurosensory exam: Present: 2-point discrimination, radial nerve intact, ulnar 

nerve intact, median nerve intact


Vascular: Present: vascular compromise, normal capillary refill, radial pulse, 

ulnar pulse





- Expanded Lower Extremity Exam


  ** Right


Hip exam: Present: normal inspection, full ROM.  Absent: tenderness, swelling


Upper Leg exam: Present: normal inspection, full ROM.  Absent: tenderness, 

swelling


Knee exam: Present: normal inspection, full ROM, tenderness, full knee 

extension.  Absent: swelling, abrasion, laceration, ecchymosis, deformity, 

crepidus, dislocation, erythema, effusion, pain w/ pronation/supination, 

posterior draw sign, pain/laxity with valgus, pain/laxity with varus


Lower Leg exam: Present: normal inspection, full ROM.  Absent: tenderness, 

swelling


Ankle exam: Present: normal inspection, full ROM.  Absent: tenderness, swelling


Foot/Toe exam: Present: normal inspection, full ROM, tenderness.  Absent: 

swelling, abrasion, laceration, ecchymosis, deformity, crepidus, dislocation, 

erythema, amputation, puncture wound, foreign body, calcaneal tenderness, 

tenderness at base of 5th metatarsal, nail avulsion, subungual hematoma


Neuro vascular tendon exam: Present: no vascular compromise.  Absent: pulse 

deficit, abnormal cap refill, motor deficit, sensory deficit, tendon deficit, 

extremity cold to touch, pallor, abnormal 2-point discrimination, decreased fine

/light touch, foot drop, peroneal nerve deficit, significant pain with passive 

ROM of distal joint


Gait: Positive: observed and limited by pain





- Back Exam


Back exam: Present: normal inspection, full ROM.  Absent: tenderness, CVA 

tenderness (R), CVA tenderness (L), muscle spasm, paraspinal tenderness, 

vertebral tenderness, rash noted





- Neurological Exam


Neurological exam: Present: alert, oriented X3, normal gait





- Psychiatric


Psychiatric exam: Present: normal affect, normal mood





- Skin


Skin exam: Present: warm, dry, intact, normal color.  Absent: rash





ED Course


 Vital Signs











  09/27/18 09/27/18





  18:03 19:00


 


Temperature 98.2 F 


 


Pulse Rate 60 


 


Respiratory 16 18





Rate  


 


Blood Pressure 172/85 


 


O2 Sat by Pulse 100 





Oximetry  














- Reevaluation(s)


Reevaluation #1: 





09/27/18 19:51


Patient is speaking in full sentences with no signs of distress noted.





ED Medical Decision Making





- Medical Decision Making





This is a 50-year-old female that presents with right foot, hand, and knee 

strain.  Patient is stable and was examined by me.  I referred patient to an 

orthopedic doctor for further evaluation for possible MRI.  X-ray has been 

obtained and dictated by the radiologist.  Patient is notified of the x-ray 

report with noted by the patient.  Patient does have normal gait with no 

tenderness and no joint swelling.  No ecchymosis. no joint redness or swelling. 

Not warm to touch. No signs of cellulites present. Patient received a knee 

immobilizer and crutches at discharge and was educated by RN how to use 

crutches.  Patient was instructed to RICE therapy.  Patient received Motrin for 

pain.  Patient is discharged with Motrin. At time of discharge, the patient 

does not seem toxic or ill in appearance.  No acute signs of distress noted.  

Patient agrees to discharge treatment plan of care.  No further questions noted 

by the patient.


Critical care attestation.: 


If time is entered above; I have spent that time in minutes in the direct care 

of this critically ill patient, excluding procedure time.








ED Disposition


Clinical Impression: 


Strain of right knee


Qualifiers:


 Encounter type: initial encounter Qualified Code(s): S86.911A - Strain of 

unspecified muscle(s) and tendon(s) at lower leg level, right leg, initial 

encounter





Fall


Qualifiers:


 Encounter type: initial encounter Qualified Code(s): W19.XXXA - Unspecified 

fall, initial encounter





Right foot strain


Qualifiers:


 Encounter type: initial encounter Qualified Code(s): S96.911A - Strain of 

unspecified muscle and tendon at ankle and foot level, right foot, initial 

encounter





Strain of right hand


Qualifiers:


 Encounter type: initial encounter Qualified Code(s): S66.911A - Strain of 

unspecified muscle, fascia and tendon at wrist and hand level, right hand, 

initial encounter





Disposition: DC-01 TO HOME OR SELFCARE


Is pt being admited?: No


Does the pt Need Aspirin: No


Condition: Stable


Instructions:  Knee Pain (ED), Knee Immobilizer (ED), RICE Therapy (ED), Crutch 

Instructions (ED)


Additional Instructions: 


Follow-up with a orthopedic doctor in 3-5 days or if symptoms worsen and 

continue return to emergency room as soon as possible. 


Prescriptions: 


Ibuprofen [Motrin] 600 mg PO Q8H PRN #30 tablet


 PRN Reason: Pain


Referrals: 


PRIMARY CARE,MD [Primary Care Provider] - 3-5 Days


PIPO LONG MD [Staff Physician] - 3-5 Days


[facility] [Outside] - 3-5 Days


[facility] [Outside] - 3-5 Days


Forms:  Work/School Release Form(ED) 5

## 2022-02-02 NOTE — DIETITIAN INITIAL EVALUATION ADULT. - FACTORS AFF FOOD INTAKE
change in mental status weakness, GI bleeding, liver cirrhosis, h/o ETOH, CKD, porcelain gallbladder, Guillain-Lawrence syndrome, HTN, COVID infection , h/o hip fracture/replacement/difficulty with food procurement/preparation/persistent lack of appetite/other (specify)

## 2022-02-02 NOTE — CONSULT NOTE ADULT - SUBJECTIVE AND OBJECTIVE BOX
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 Patient is able to maintain good conversation but is AOx1 (oriented to place but not person and time). Accoring to NH papers pt was noted tp have increasing confusion and weakness. He was recently diagnosed with Covid 19 on 1/10/22 and completed 14 day quarantine. Also completed 14 days of ceftin for bronchitis. Patient states he has been feeling unwell for many years. Complains of left arm pain at the shoulder and left hip pain. Denies trauma, abdominal pain, fever, chills, cough, or any other acute complaints.   Called daughter, Eben, but she was out of the country  Called wife but she did not .  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)    Pancreatitis  2/2 choledocholithiasis s/p ERCP with stone extraction and plastic stent placement (11/2019, stent now removed)    Hematochezia  2/2 colonic AVMs s/p cauterization and hemorrhoids (11/2019)    Melena  2/2 duodenal ulcers s/p argon plasma coagulation (4/2020)    Chronic kidney disease (CKD)    H/O inguinal hernia repair    History of repair of hip fracture  R hip    History of hip replacement  10/2020        No Known Allergies      Meds:  cyanocobalamin 1000 MICROGram(s) Oral daily  dexAMETHasone  Injectable 6 milliGRAM(s) IV Push daily  enoxaparin Injectable 60 milliGRAM(s) SubCutaneous two times a day  furosemide    Tablet 20 milliGRAM(s) Oral daily  lactulose Syrup 10 Gram(s) Oral two times a day  multivitamin 1 Tablet(s) Oral daily  nystatin Powder 1 Application(s) Topical three times a day  pantoprazole    Tablet 40 milliGRAM(s) Oral before breakfast  polyethylene glycol 3350 17 Gram(s) Oral daily  propranolol 10 milliGRAM(s) Oral daily  thiamine 100 milliGRAM(s) Oral daily      SOCIAL HISTORY:  Smoker:  YES / NO        PACK YEARS:                         WHEN QUIT?  ETOH use:  YES / NO               FREQUENCY / QUANTITY:  Ilicit Drug use:  YES / NO  Occupation:  Assisted device use (Cane / Walker):  Live with:    FAMILY HISTORY:  Family history of ovarian cancer (Sibling)        VITALS:  Vital Signs Last 24 Hrs  T(C): 36.6 (02 Feb 2022 13:50), Max: 36.6 (02 Feb 2022 13:50)  T(F): 97.9 (02 Feb 2022 13:50), Max: 97.9 (02 Feb 2022 13:50)  HR: 82 (02 Feb 2022 13:50) (50 - 82)  BP: 108/75 (02 Feb 2022 13:50) (108/75 - 119/65)  BP(mean): 83 (02 Feb 2022 13:50) (83 - 83)  RR: 17 (02 Feb 2022 13:50) (17 - 18)  SpO2: 98% (02 Feb 2022 13:50) (98% - 100%)    LABS/DIAGNOSTIC TESTS:                          10.6   7.96  )-----------( 217      ( 02 Feb 2022 07:04 )             32.9     WBC Count: 7.96 K/uL (02-02 @ 07:04)  WBC Count: 7.66 K/uL (02-01 @ 12:07)  WBC Count: 6.84 K/uL (01-31 @ 07:30)      02-02    139  |  102  |  31<H>  ----------------------------<  93  4.3   |  29  |  0.95    Ca    8.6      02 Feb 2022 07:04    TPro  5.5<L>  /  Alb  1.7<L>  /  TBili  0.4  /  DBili  x   /  AST  50<H>  /  ALT  47  /  AlkPhos  256<H>  02-02          LIVER FUNCTIONS - ( 02 Feb 2022 07:04 )  Alb: 1.7 g/dL / Pro: 5.5 g/dL / ALK PHOS: 256 U/L / ALT: 47 U/L DA / AST: 50 U/L / GGT: x                 LACTATE:    ABG -     CULTURES:   Clean Catch Clean Catch (Midstream)  01-30 @ 02:08   >100,000 CFU/ml Escherichia coli ESBL  --  Escherichia coli ESBL          RADIOLOGY:< from: CT Head No Cont (01.28.22 @ 18:11) >  ACC: [id number] EXAM:  CT BRAIN                          PROCEDURE DATE:  01/28/2022          INTERPRETATION:  Exam Date: 1/28/2022 6:11 PM    CT head without IV contrast    CLINICAL INFORMATION:  ams    TECHNIQUE: Contiguous axial sections were obtained through the head.     Coronal and sagittal reformats were obtained.    COMPARISON: CT head 7/21/2021.    FINDINGS:    There is no evidence of intraparenchymal or extraaxial hemorrhage.     There is no CT evidence of large vessel acute infarct. Nomass effect is   found in the brain.  No evidence of midline shift or herniation pattern.    The ventricles, sulci and basal cisterns appear unremarkable.    Complete opacification of the right maxillary sinus with hyperattenuating   secretions, suggestive of chronic right maxillary sinusitis.    IMPRESSION:    No acute intracranial findings.    Complete opacification of the right maxillary sinus with hyperattenuating   secretions, suggestive of chronic right maxillary sinusitis.    --- End of Report ---            NIGEL RODRIGUEZ MD; Attending Radiologist  This document has been electronically signed. Jan 28 2022  6:21PM    < end of copied text >  --------------------------------------------------------------------------------------------------------------------------------------------------------------------------------------------------------------------------------------------------  ACC: [id number] EXAM:  XR CHEST PORTABLE URGENT 1V                          PROCEDURE DATE:  01/28/2022          INTERPRETATION:  CLINICAL STATEMENT: Chest Pain.    TECHNIQUE: AP view of the chest.    COMPARISON: 1/2/2022    FINDINGS/  IMPRESSION:  New nonspecific airspace opacities lung bases. Correlate clinically and   follow-up recommended as clinically warranted.    No pleural effusion    Heart size cannot be accurately assessed in this projection.    --- End of Report ---            JANY JOHNSON MD; Attending Radiologist  This document has been electronically signed. 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 Patient is able to maintain good conversation but is AOx1 (oriented to place but not person and time). According to NH papers pt was noted tp have increasing confusion and weakness. He was recently diagnosed with Covid 19 on 1/10/22 and completed 14 day quarantine. Also completed 14 days of ceftin for bronchitis. Patient states he has been feeling unwell for many years. Complains of left arm pain at the shoulder and left hip pain. Denies trauma, abdominal pain, fever, chills, cough, or any other acute complaints.   Called daughter, Eben, but she was out of the country  Called wife but she did not .  (28 Jan 2022 22:25)        History as above, pt with multiple medical problems who presented from a NH with confusion and weakness, and was found to have a UTI here, he was initially treated with Rocephin but is growing out an ESBL E. Coli  strain now. He is AAO x 3 now and is giving me a proper history and mainly c/o weakness in his legs but on questioning does state that he has some mild dysuria also but denies any frequency or hesitancy , no nausea, vomiting , diarrhea or abdominal pain, he has no fevers or chills.     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)    Pancreatitis  2/2 choledocholithiasis s/p ERCP with stone extraction and plastic stent placement (11/2019, stent now removed)    Hematochezia  2/2 colonic AVMs s/p cauterization and hemorrhoids (11/2019)    Melena  2/2 duodenal ulcers s/p argon plasma coagulation (4/2020)    Chronic kidney disease (CKD)    H/O inguinal hernia repair    History of repair of hip fracture  R hip    History of hip replacement  10/2020        No Known Allergies      Meds:  cyanocobalamin 1000 MICROGram(s) Oral daily  dexAMETHasone  Injectable 6 milliGRAM(s) IV Push daily  enoxaparin Injectable 60 milliGRAM(s) SubCutaneous two times a day  furosemide    Tablet 20 milliGRAM(s) Oral daily  lactulose Syrup 10 Gram(s) Oral two times a day  multivitamin 1 Tablet(s) Oral daily  nystatin Powder 1 Application(s) Topical three times a day  pantoprazole    Tablet 40 milliGRAM(s) Oral before breakfast  polyethylene glycol 3350 17 Gram(s) Oral daily  propranolol 10 milliGRAM(s) Oral daily  thiamine 100 milliGRAM(s) Oral daily      SOCIAL HISTORY:  Smoker:  no  ETOH use:  abused alcohol in the past  Ilicit Drug use:  NO      FAMILY HISTORY:  Family history of ovarian cancer (Sibling)        VITALS:  Vital Signs Last 24 Hrs  T(C): 36.6 (02 Feb 2022 13:50), Max: 36.6 (02 Feb 2022 13:50)  T(F): 97.9 (02 Feb 2022 13:50), Max: 97.9 (02 Feb 2022 13:50)  HR: 82 (02 Feb 2022 13:50) (50 - 82)  BP: 108/75 (02 Feb 2022 13:50) (108/75 - 119/65)  BP(mean): 83 (02 Feb 2022 13:50) (83 - 83)  RR: 17 (02 Feb 2022 13:50) (17 - 18)  SpO2: 98% (02 Feb 2022 13:50) (98% - 100%)    LABS/DIAGNOSTIC TESTS:                          10.6   7.96  )-----------( 217      ( 02 Feb 2022 07:04 )             32.9     WBC Count: 7.96 K/uL (02-02 @ 07:04)  WBC Count: 7.66 K/uL (02-01 @ 12:07)  WBC Count: 6.84 K/uL (01-31 @ 07:30)      02-02    139  |  102  |  31<H>  ----------------------------<  93  4.3   |  29  |  0.95    Ca    8.6      02 Feb 2022 07:04    TPro  5.5<L>  /  Alb  1.7<L>  /  TBili  0.4  /  DBili  x   /  AST  50<H>  /  ALT  47  /  AlkPhos  256<H>  02-02          LIVER FUNCTIONS - ( 02 Feb 2022 07:04 )  Alb: 1.7 g/dL / Pro: 5.5 g/dL / ALK PHOS: 256 U/L / ALT: 47 U/L DA / AST: 50 U/L / GGT: x                 LACTATE:    ABG -     CULTURES:   Clean Catch Clean Catch (Midstream)  01-30 @ 02:08   >100,000 CFU/ml Escherichia coli ESBL  --  Escherichia coli ESBL          RADIOLOGY:< from: CT Head No Cont (01.28.22 @ 18:11) >  ACC: [id number] EXAM:  CT BRAIN                          PROCEDURE DATE:  01/28/2022          INTERPRETATION:  Exam Date: 1/28/2022 6:11 PM    CT head without IV contrast    CLINICAL INFORMATION:  ams    TECHNIQUE: Contiguous axial sections were obtained through the head.     Coronal and sagittal reformats were obtained.    COMPARISON: CT head 7/21/2021.    FINDINGS:    There is no evidence of intraparenchymal or extraaxial hemorrhage.     There is no CT evidence of large vessel acute infarct. Nomass effect is   found in the brain.  No evidence of midline shift or herniation pattern.    The ventricles, sulci and basal cisterns appear unremarkable.    Complete opacification of the right maxillary sinus with hyperattenuating   secretions, suggestive of chronic right maxillary sinusitis.    IMPRESSION:    No acute intracranial findings.    Complete opacification of the right maxillary sinus with hyperattenuating   secretions, suggestive of chronic right maxillary sinusitis.    --- End of Report ---            NIGEL RODRIGUEZ MD; Attending Radiologist  This document has been electronically signed. Jan 28 2022  6:21PM    < end of copied text >  --------------------------------------------------------------------------------------------------------------------------------------------------------------------------------------------------------------------------------------------------  ACC: [id number] EXAM:  XR CHEST PORTABLE URGENT 1V                          PROCEDURE DATE:  01/28/2022          INTERPRETATION:  CLINICAL STATEMENT: Chest Pain.    TECHNIQUE: AP view of the chest.    COMPARISON: 1/2/2022    FINDINGS/  IMPRESSION:  New nonspecific airspace opacities lung bases. Correlate clinically and   follow-up recommended as clinically warranted.    No pleural effusion    Heart size cannot be accurately assessed in this projection.    --- End of Report ---            JANY JOHNSON MD; Attending Radiologist  This document has been electronically signed. Jan 28 2022  7:21PM    < end of copied text >        ROS  [  ] UNABLE TO ELICIT

## 2022-02-02 NOTE — PROGRESS NOTE ADULT - PROBLEM SELECTOR PLAN 7
Lovenox 60mg SC bid for dvt ppx (full dose).
-  DVT prophylaxis with full dose Lovenox  -  GI prophylaxis with Protonix
Lovenox 60mg SC bid for dvt ppx (full dose).

## 2022-02-02 NOTE — DIETITIAN INITIAL EVALUATION ADULT. - OTHER INFO
Pt from nursing home for infection due to coronavirus 2, secondary dx encephalopathy r/t covid 19 virus. Pt visited with Dietetic intern 2/2/22,  pt stated he lost weight, unaware of amount and duration. No c/o chewing or swallowing difficulties, no GI distress,  NKFA, NKFI. Pt was able provide food preferences  and accept diet supplementation. Pt from nursing home admitted for have increasing confusion and weakness. Pt visited with Dietetic intern, pt. alert & awake, states consumes his daily meals however in "smaller portions" NH, Admits to losing weight, unaware of amount and duration? per bed scale 138.6 Lbs & also verified height as 5' 8", per flow sheet weight 172.2 Lbs on 03/27/21 & dx. with malnutrition by RD noted. Pt. with no complains of chewing or swallowing difficulties, no GI distress, able provide food preferences and accept diet supplementation. Pressure injury - stage I - coccyx w/wound care. Pt from [facility] admitted for have increasing confusion and weakness. Pt visited with Dietetic intern, pt. alert & awake, states consumes his daily meals however in "smaller portions" at NH, Admits to losing weight, unaware of amount and duration? per bed scale 138.6 Lbs & also verified height as 5' 8", per flow sheet weight 172.2 Lbs on 03/27/21 & dx. with malnutrition by RD noted. Pt. with no complains of chewing or swallowing difficulties, no GI distress, able provide food preferences and accept diet supplementation. Pressure injury - stage I - coccyx w/wound care. Pt from [facility] admitted for have increasing confusion and weakness. Pt visited with Dietetic intern, pt. alert & awake, states consumes his daily meals however in "smaller portions" at NH, Admits to losing weight, unaware of amount and duration? per bed scale 138.6 Lbs & also verified height as 5' 8", per flow sheet weight 172.2 Lbs on 03/27/21 & dx. with malnutrition by RD noted. Pt. with no complains of chewing or swallowing difficulties, no GI distress, able to provide food preferences and accept diet supplementation. Pressure injury - stage I - coccyx w/wound care. Pt from [facility] admitted for have increasing confusion and weakness. Pt visited with Dietetic intern, pt. alert & awake, states consumes his daily meals however in "smaller portions" at NH, Admits to losing weight, unaware of amount and duration? per bed scale 138.6 Lbs & also verified height as 5' 8", per flow sheet weight 172.2 Lbs on 03/27/21 & dx. with malnutrition by RD noted. Pt. with no complains of chewing or swallowing difficulties, no GI distress, able to provide food preferences and accept nutrition supplementation. Pressure injury - stage I - coccyx w/wound care.

## 2022-02-02 NOTE — PROGRESS NOTE ADULT - PROBLEM SELECTOR PLAN 8
-  PT recommends [facility] when medically stable for discharge  -  Pending antibiotic plan for ESBL in urine culture -  PT recommends [facility] when medically stable for discharge  - Can be discharged with Bactrim DS  one tab B.I.D. for a total of 7 days

## 2022-02-02 NOTE — DIETITIAN INITIAL EVALUATION ADULT. - PROBLEM SELECTOR PLAN 4
Left shoulder and hip pain   Follow official read on Lt shoulder and hip xray  HX cervical spinal stenosis, underwent decompression 7/2021.   PT consulted

## 2022-02-02 NOTE — DIETITIAN INITIAL EVALUATION ADULT. - ETIOLOGY
r/t acute on chronic illness: Covid 19 infection, encephalopathy, CKD, Liver Cirrhosis, UTI, Guillan-Pace Syndrome. Alcohol liver cirrhosis w/COVID infection, multiple comorbidities & advance age

## 2022-02-02 NOTE — CONSULT NOTE ADULT - ASSESSMENT
UTI (ESBL E. Coli)    Plan - Start Bactrim DS 1 tab po bid x 7 days in total  DC planning when medically ready.

## 2022-02-02 NOTE — PROGRESS NOTE ADULT - PROBLEM SELECTOR PLAN 5
Bilirubin wnl  known gallbladder disease  Was recommended to follow with Dr. Mckeon for further EHL and stone removal on previous admission.
Bilirubin wnl  known gallbladder disease  Was recommended to follow with Dr. Mckeon for further EHL and stone removal on previous admission.
-  Bilirubin wnl  -  known history of porclein gallbladder disease  -  Was recommended to follow with Dr. Mckeon for further EHL and stone removal on previous admission.

## 2022-02-02 NOTE — PROGRESS NOTE ADULT - ASSESSMENT
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 Patient admitted to medicine for acute encephalopathy, able to maintain good conversation but is AOx1 (oriented to place but not person and time). He was recently diagnosed with Covid 19 on 1/10/22 and completed 14 day quarantine. Also completed 14 days of ceftin for bronchitis.  Complains of left arm pain at the shoulder and left hip pain. CT head, negative for mass or bleed, X-ray left shoulder and left hip negative for acute fracture. 
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 Patient admitted to medicine for acute encephalopathy, able to maintain good conversation but is AOx1 (oriented to place but not person and time). He was recently diagnosed with Covid 19 on 1/10/22 and completed 14 day quarantine. Also completed 14 days of ceftin for bronchitis.  Complains of left arm pain at the shoulder and left hip pain. CT head, negative for mass or bleed, X-ray left shoulder and left hip negative for acute fracture.     2/1- pt seen and examined at bedside, U.cx positive for E.coli ESBL. Pt evaluated patient with recommendation for [facility] after discharge. CM consulted for safe discharge plan.
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 Patient admitted to medicine for acute encephalopathy, able to maintain good conversation but is AOx1 (oriented to place but not person and time). He was recently diagnosed with Covid 19 on 1/10/22 and completed 14 day quarantine. Also completed 14 days of ceftin for bronchitis.  Complains of left arm pain at the shoulder and left hip pain. CT head, negative for mass or bleed, X-ray left shoulder and left hip negative for acute fracture.     2/1- pt seen and examined at bedside, U.cx positive for E.coli ESBL. Pt evaluated patient with recommendation for [facility] after discharge. CM consulted for safe discharge plan.
Alert and oriented to person, place and time

## 2022-02-02 NOTE — PROGRESS NOTE ADULT - SUBJECTIVE AND OBJECTIVE BOX
INTERVAL HPI/OVERNIGHT EVENTS:  Patient seen,event noticed,no acute issues  VITAL SIGNS:  T(F): 97.7 (22 @ 05:44)  HR: 75 (22 @ 05:44)  BP: 107/70 (22 @ 05:44)  RR: 18 (22 @ 05:44)  SpO2: 98% (22 @ 05:44)  Wt(kg): --    PHYSICAL EXAM:  awake  Constitutional:  Eyes:  ENMT:perrla  Neck:  Respiratory:few rales  Cardiovascular:s1s2,m-none  Gastrointestinal:soft,bs pos  Extremities:  Vascular:  Neurological:no focalk deficit  Musculoskeletal:    MEDICATIONS  (STANDING):  cefTRIAXone   IVPB 1000 milliGRAM(s) IV Intermittent every 24 hours  cyanocobalamin 1000 MICROGram(s) Oral daily  dexAMETHasone  Injectable 6 milliGRAM(s) IV Push daily  enoxaparin Injectable 60 milliGRAM(s) SubCutaneous two times a day  furosemide    Tablet 20 milliGRAM(s) Oral daily  lactulose Syrup 10 Gram(s) Oral two times a day  multivitamin 1 Tablet(s) Oral daily  nystatin Powder 1 Application(s) Topical three times a day  pantoprazole    Tablet 40 milliGRAM(s) Oral before breakfast  polyethylene glycol 3350 17 Gram(s) Oral daily  propranolol 10 milliGRAM(s) Oral daily  thiamine 100 milliGRAM(s) Oral daily    MEDICATIONS  (PRN):      Allergies    No Known Allergies    Intolerances        LABS:                        11.6   9.26  )-----------( 275      ( 2022 07:06 )             36.1         141  |  104  |  42<H>  ----------------------------<  132<H>  4.6   |  31  |  1.11    Ca    9.5      2022 07:06  Phos  2.2       Mg     2.3         TPro  6.9  /  Alb  1.8<L>  /  TBili  0.4  /  DBili  x   /  AST  31  /  ALT  22  /  AlkPhos  325<H>        Urinalysis Basic - ( 2022 04:07 )    Color: Yellow / Appearance: Clear / S.010 / pH: x  Gluc: x / Ketone: Negative  / Bili: Negative / Urobili: Negative   Blood: x / Protein: 15 / Nitrite: Negative   Leuk Esterase: Moderate / RBC: 0-2 /HPF / WBC 26-50 /HPF   Sq Epi: x / Non Sq Epi: Occasional /HPF / Bacteria: Many /HPF        RADIOLOGY & ADDITIONAL TESTS:      Assessment and Plan:    Assessment:  · Assessment	  81M from home lives with wife, unable to ambulate, PMH liver cirrhosis (alcohol), 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 Admitted for further workup      Problem/Plan - 1:  ·  Problem: Acute encephalopathy-improved clinically.   hepatic encephalopathy vs covid encephalopathy vs metabolic vs infectious   Covid + since 1/10/22  Started on ceftriaxone   Continue to monitor.     Problem/Plan - 2:  ·  Problem: 2019 novel coronavirus disease (COVID-19).   COVID PCR positive  Will start on decadron   Will continue with supplemental oxygen, increase support as needed  Will start on full dose lovenox as Dimer>2ULN  f/u and trend d-dimer  Consider early proning if pt starts to desaturate on NRB  Low threshold for ICU consultation if pt desaturates.     Problem/Plan - 3:  ·  Problem: Acute UTI.   ·  Plan: UA +  started on ceftriaxone  Follow urine cultures.     Problem/Plan - 4:  ·  Problem: Pain in joints.   ·  Plan: Left shoulder and hip pain   Follow official read on Lt shoulder and hip xray  HX cervical spinal stenosis, underwent decompression 2021.   PT consulted.     Problem/Plan - 5:  ·  Problem: Cirrhosis.   ·  Plan: takes propranolol 10, lasix 20, lactulose  c/w home meds.  Hold lactulose for >3BM.     Problem/Plan - 6:  ·  Problem: Chronic kidney disease (CKD).   ·  Plan: BUN/Cr: 25/1  Avoid nephrotoxins   Follow BMP.     Problem/Plan - 7:  ·  Problem: Cholelithiasis.   Bili wnl  known gall bladder disease  Was recommended to follow with Dr. Mckeon for further EHL and stone removal on previous admission.     Problem/Plan - 8:  ·  Problem: Prophylactic measure.   ·  Plan: IMPROVE VTE Individual Risk Assessment  RISK                                                                Points  [  ] Previous VTE                                                  3  [  ] Thrombophilia                                               2  [  ] Lower limb paralysis                                      2        (unable to hold up >15 seconds)    [  ] Current Cancer                                              2         (within 6 months)  [x  ] Immobilization > 24 hrs                                1  [  ] ICU/CCU stay > 24 hours                              1  [x  ] Age > 60                                                      1  IMPROVE VTE Score _________2, -- for DVT proph  on full dose lovenox.    
NP Note discussed with Primary Attending      Patient is a 81y old  Male who presents with a chief complaint of AMS (31 Jan 2022 17:41)      INTERVAL HPI/OVERNIGHT EVENTS: no new complaints    MEDICATIONS  (STANDING):  cyanocobalamin 1000 MICROGram(s) Oral daily  dexAMETHasone  Injectable 6 milliGRAM(s) IV Push daily  enoxaparin Injectable 60 milliGRAM(s) SubCutaneous two times a day  furosemide    Tablet 20 milliGRAM(s) Oral daily  lactulose Syrup 10 Gram(s) Oral two times a day  multivitamin 1 Tablet(s) Oral daily  nystatin Powder 1 Application(s) Topical three times a day  pantoprazole    Tablet 40 milliGRAM(s) Oral before breakfast  polyethylene glycol 3350 17 Gram(s) Oral daily  propranolol 10 milliGRAM(s) Oral daily  thiamine 100 milliGRAM(s) Oral daily    MEDICATIONS  (PRN):      __________________________________________________  REVIEW OF SYSTEMS:    CONSTITUTIONAL: No fever,  chills  EYES: no acute visual disturbances  NECK: No pain or stiffness  RESPIRATORY: No cough; No shortness of breath  CARDIOVASCULAR: No chest pain, no palpitations  GASTROINTESTINAL: No abdominal pain,  nausea or vomiting; No diarrhea   NEUROLOGICAL: No headache or numbness, no tremors  MUSCULOSKELETAL: No joint pain, no muscle pain  GENITOURINARY: no dysuria, no frequency, no hesitancy, no flank pain  PSYCHIATRY: no depression , no anxiety  ALL OTHER  ROS negative        Vital Signs Last 24 Hrs  T(C): 36.2 (02 Feb 2022 06:30), Max: 36.6 (01 Feb 2022 14:46)  T(F): 97.2 (02 Feb 2022 06:30), Max: 97.8 (01 Feb 2022 14:46)  HR: 50 (02 Feb 2022 06:30) (50 - 63)  BP: 117/66 (02 Feb 2022 06:30) (100/63 - 142/76)  BP(mean): --  RR: 17 (02 Feb 2022 06:30) (16 - 18)  SpO2: 100% (02 Feb 2022 06:30) (98% - 100%)      ________________________________________________  PHYSICAL EXAM:  GENERAL: No acute distress  HEENT: Normocephalic;  conjunctivae and sclerae clear; moist mucous membranes;   NECK : supple.  No JVD noted  CHEST/LUNG: Clear to auscultation bilaterally.   No rales, wheezes or rhonchi  HEART: S1 S2  regular; no murmurs,  ABDOMEN: Soft, Nontender, Nondistended; Bowel sounds present  EXTREMITIES: no cyanosis; no edema; no calf tenderness  SKIN: warm and dry; no rash  NERVOUS SYSTEM:  Awake and alert; Oriented  to place, person and time ; no new deficits    _________________________________________________  LABS:                                               10.6   7.96  )-----------( 217      ( 02 Feb 2022 07:04 )             32.9       02-02    139  |  102  |  31<H>  ----------------------------<  93  4.3   |  29  |  0.95    Ca    8.6      02 Feb 2022 07:04    TPro  5.5<L>  /  Alb  1.7<L>  /  TBili  0.4  /  DBili  x   /  AST  50<H>  /  ALT  47  /  AlkPhos  256<H>  02-02      RADIOLOGY & ADDITIONAL TESTS:  ACC: [id number] EXAM:  XR SHOULDER COMP MIN 2V LT                          PROCEDURE DATE:  01/29/2022          INTERPRETATION:  History: Left-sided shoulder pain.    Single view of the shoulder was performed.    IMPRESSION:    Study is limited by the lack of orthogonal view. No displaced fracture is   demonstrated. Consider repeat radiographs when clinically feasible. There   is mild acromioclavicular joint arthrosis.    RICARDO ABDULLAHI MD; Attending Radiologist  This document has been electronically signed. Jan 29 2022 10:54AM    ACC: [id number] EXAM:  XR CHEST PORTABLE URGENT 1V                          PROCEDURE DATE:  01/28/2022          INTERPRETATION:  CLINICAL STATEMENT: Chest Pain.    TECHNIQUE: AP view of the chest.    COMPARISON: 1/2/2022    FINDINGS/  IMPRESSION:  New nonspecific airspace opacities lung bases. Correlate clinically and   follow-up recommended as clinically warranted.    No pleural effusion    Heart size cannot be accurately assessed in this projection.    JANY JOHNSON MD; Attending Radiologist  This document has been electronically signed. Jan 28 2022  7:21PM    ACC: [id number] EXAM:  CT BRAIN                          PROCEDURE DATE:  01/28/2022          INTERPRETATION:  Exam Date: 1/28/2022 6:11 PM    CT head without IV contrast    CLINICAL INFORMATION:  ams    TECHNIQUE: Contiguous axial sections were obtained through the head.     Coronal and sagittal reformats were obtained.    COMPARISON: CT head 7/21/2021.    FINDINGS:    There is no evidence of intraparenchymal or extraaxial hemorrhage.     There is no CT evidence of large vessel acute infarct. Nomass effect is   found in the brain.  No evidence of midline shift or herniation pattern.    The ventricles, sulci and basal cisterns appear unremarkable.    Complete opacification of the right maxillary sinus with hyperattenuating   secretions, suggestive of chronic right maxillary sinusitis.    IMPRESSION:    No acute intracranial findings.    Complete opacification of the right maxillary sinus with hyperattenuating   secretions, suggestive of chronic right maxillary sinusitis.      NIGEL RODRIGUEZ MD; Attending Radiologist  This document has been electronically signed. Jan 28 2022  6:21PM      Imaging  Reviewed:  YES/NO    Consultant(s) Notes Reviewed:   YES/ No      Plan of care was discussed with patient and /or primary care giver; all questions and concerns were addressed 
NP Note discussed with Primary Attending.    Patient is a 81y old  Male who presents with a chief complaint of AMS (30 Jan 2022 09:13)      INTERVAL HPI/OVERNIGHT EVENTS: no new complaints    MEDICATIONS  (STANDING):  cyanocobalamin 1000 MICROGram(s) Oral daily  dexAMETHasone  Injectable 6 milliGRAM(s) IV Push daily  enoxaparin Injectable 60 milliGRAM(s) SubCutaneous two times a day  furosemide    Tablet 20 milliGRAM(s) Oral daily  lactulose Syrup 10 Gram(s) Oral two times a day  multivitamin 1 Tablet(s) Oral daily  nystatin Powder 1 Application(s) Topical three times a day  pantoprazole    Tablet 40 milliGRAM(s) Oral before breakfast  polyethylene glycol 3350 17 Gram(s) Oral daily  propranolol 10 milliGRAM(s) Oral daily  thiamine 100 milliGRAM(s) Oral daily    MEDICATIONS  (PRN):      __________________________________________________  REVIEW OF SYSTEMS:    CONSTITUTIONAL: No fever,   EYES: no acute visual disturbances  NECK: No pain or stiffness  RESPIRATORY: No cough; No shortness of breath  CARDIOVASCULAR: No chest pain, no palpitations  GASTROINTESTINAL: No pain. No nausea or vomiting; No diarrhea   NEUROLOGICAL: No headache or numbness, no tremors  MUSCULOSKELETAL: No joint pain, no muscle pain  GENITOURINARY: no dysuria, no frequency, no hesitancy  PSYCHIATRY: no depression , no anxiety  ALL OTHER  ROS negative        Vital Signs Last 24 Hrs  T(C): 36.3 (31 Jan 2022 14:12), Max: 36.3 (31 Jan 2022 05:42)  T(F): 97.4 (31 Jan 2022 14:12), Max: 97.4 (31 Jan 2022 14:12)  HR: 73 (31 Jan 2022 14:12) (57 - 73)  BP: 142/67 (31 Jan 2022 14:12) (111/65 - 142/67)  BP(mean): 81 (31 Jan 2022 14:12) (81 - 81)  RR: 20 (31 Jan 2022 14:12) (18 - 20)  SpO2: 100% (31 Jan 2022 14:12) (97% - 100%)    ________________________________________________  PHYSICAL EXAM:  GENERAL: NAD  HEENT: Normocephalic;  conjunctivae and sclerae clear; moist mucous membranes;   NECK : supple  CHEST/LUNG: Clear to auscultation bilaterally with good air entry   HEART: S1 S2  regular; no murmurs, gallops or rubs  ABDOMEN: Soft, Nontender, Nondistended; Bowel sounds present  EXTREMITIES: no cyanosis; no edema; no calf tenderness  SKIN: warm and dry; no rash  NERVOUS SYSTEM:  Awake and alert; Oriented  to place, person and time ; no new deficits    _________________________________________________  LABS:                        11.2   6.84  )-----------( 220      ( 31 Jan 2022 07:30 )             33.9     01-31    137  |  103  |  33<H>  ----------------------------<  126<H>  4.7   |  29  |  0.91    Ca    9.0      31 Jan 2022 07:30  Phos  3.0     01-31  Mg     2.4     01-31    TPro  6.0  /  Alb  1.7<L>  /  TBili  0.3  /  DBili  x   /  AST  37  /  ALT  28  /  AlkPhos  280<H>  01-31        CAPILLARY BLOOD GLUCOSE            RADIOLOGY & ADDITIONAL TESTS:  ACC: [id number] EXAM:  XR SHOULDER COMP MIN 2V LT                          PROCEDURE DATE:  01/29/2022          INTERPRETATION:  History: Left-sided shoulder pain.    Single view of the shoulder was performed.    IMPRESSION:    Study is limited by the lack of orthogonal view. No displaced fracture is   demonstrated. Consider repeat radiographs when clinically feasible. There   is mild acromioclavicular joint arthrosis.    --- End of Report ---            RICARDO ABDULLAHI MD; Attending Radiologist  This document has been electronically signed. Jan 29 2022 10:54AM    ACC: [id number] EXAM:  XR CHEST PORTABLE URGENT 1V                          PROCEDURE DATE:  01/28/2022          INTERPRETATION:  CLINICAL STATEMENT: Chest Pain.    TECHNIQUE: AP view of the chest.    COMPARISON: 1/2/2022    FINDINGS/  IMPRESSION:  New nonspecific airspace opacities lung bases. Correlate clinically and   follow-up recommended as clinically warranted.    No pleural effusion    Heart size cannot be accurately assessed in this projection.    --- End of Report ---            JANY JOHNSON MD; Attending Radiologist  This document has been electronically signed. Jan 28 2022  7:21PM    ACC: [id number] EXAM:  XR HIP WITH PELV 2-3V LT                          PROCEDURE DATE:  01/28/2022          INTERPRETATION:  History: Left-sided hip pain.    2 views of the pelvis were performed.    IMPRESSION:    Patient is status post right hip arthroplasty with a noncemented femoral   component and acetabular augmentation screw. Hardware is intact without   evidence of osteolysis or loosening. There is no evidence of acute   fracture or dislocation. There is mild left hip arthrosis. There islower   lumbar spondylosis. Pubic symphysis is intact.    --- End of Report ---            RICARDO ABDULLAHI MD; Attending Radiologist  This document has been electronically signed. Jan 29 2022 10:01AM    ACC: [id number] EXAM:  CT BRAIN                          PROCEDURE DATE:  01/28/2022          INTERPRETATION:  Exam Date: 1/28/2022 6:11 PM    CT head without IV contrast    CLINICAL INFORMATION:  ams    TECHNIQUE: Contiguous axial sections were obtained through the head.     Coronal and sagittal reformats were obtained.    COMPARISON: CT head 7/21/2021.    FINDINGS:    There is no evidence of intraparenchymal or extraaxial hemorrhage.     There is no CT evidence of large vessel acute infarct. Nomass effect is   found in the brain.  No evidence of midline shift or herniation pattern.    The ventricles, sulci and basal cisterns appear unremarkable.    Complete opacification of the right maxillary sinus with hyperattenuating   secretions, suggestive of chronic right maxillary sinusitis.    IMPRESSION:    No acute intracranial findings.    Complete opacification of the right maxillary sinus with hyperattenuating   secretions, suggestive of chronic right maxillary sinusitis.    --- End of Report ---            NIGEL RODRIGUEZ MD; Attending Radiologist  This document has been electronically signed. Jan 28 2022  6:21PM      Imaging  Reviewed:  YES/NO    Consultant(s) Notes Reviewed:   YES/ No      Plan of care was discussed with patient and /or primary care giver; all questions and concerns were addressed 
NP Note discussed with Primary Attending      Patient is a 81y old  Male who presents with a chief complaint of AMS (31 Jan 2022 17:41)      INTERVAL HPI/OVERNIGHT EVENTS: no new complaints    MEDICATIONS  (STANDING):  cyanocobalamin 1000 MICROGram(s) Oral daily  dexAMETHasone  Injectable 6 milliGRAM(s) IV Push daily  enoxaparin Injectable 60 milliGRAM(s) SubCutaneous two times a day  furosemide    Tablet 20 milliGRAM(s) Oral daily  lactulose Syrup 10 Gram(s) Oral two times a day  multivitamin 1 Tablet(s) Oral daily  nystatin Powder 1 Application(s) Topical three times a day  pantoprazole    Tablet 40 milliGRAM(s) Oral before breakfast  polyethylene glycol 3350 17 Gram(s) Oral daily  propranolol 10 milliGRAM(s) Oral daily  thiamine 100 milliGRAM(s) Oral daily    MEDICATIONS  (PRN):      __________________________________________________  REVIEW OF SYSTEMS:    CONSTITUTIONAL: No fever,   EYES: no acute visual disturbances  NECK: No pain or stiffness  RESPIRATORY: No cough; No shortness of breath  CARDIOVASCULAR: No chest pain, no palpitations  GASTROINTESTINAL: No pain. No nausea or vomiting; No diarrhea   NEUROLOGICAL: No headache or numbness, no tremors  MUSCULOSKELETAL: No joint pain, no muscle pain  GENITOURINARY: no dysuria, no frequency, no hesitancy  PSYCHIATRY: no depression , no anxiety  ALL OTHER  ROS negative        Vital Signs Last 24 Hrs  T(C): 36.6 (01 Feb 2022 14:46), Max: 36.7 (31 Jan 2022 20:26)  T(F): 97.8 (01 Feb 2022 14:46), Max: 98 (31 Jan 2022 20:26)  HR: 61 (01 Feb 2022 15:16) (50 - 63)  BP: 142/76 (01 Feb 2022 15:16) (100/63 - 142/76)  BP(mean): --  RR: 16 (01 Feb 2022 14:46) (16 - 18)  SpO2: 100% (01 Feb 2022 15:16) (98% - 100%)    ________________________________________________  PHYSICAL EXAM:  GENERAL: NAD  HEENT: Normocephalic;  conjunctivae and sclerae clear; moist mucous membranes;   NECK : supple  CHEST/LUNG: Clear to auscultation bilaterally with good air entry   HEART: S1 S2  regular; no murmurs, gallops or rubs  ABDOMEN: Soft, Nontender, Nondistended; Bowel sounds present  EXTREMITIES: no cyanosis; no edema; no calf tenderness  SKIN: warm and dry; no rash  NERVOUS SYSTEM:  Awake and alert; Oriented  to place, person and time ; no new deficits    _________________________________________________  LABS:                        12.2   7.66  )-----------( 262      ( 01 Feb 2022 12:07 )             37.8     02-01    137  |  99  |  32<H>  ----------------------------<  134<H>  4.4   |  31  |  1.01    Ca    9.3      01 Feb 2022 12:07  Phos  3.0     01-31  Mg     2.4     01-31    TPro  6.7  /  Alb  2.0<L>  /  TBili  0.4  /  DBili  x   /  AST  58<H>  /  ALT  48  /  AlkPhos  320<H>  02-01        CAPILLARY BLOOD GLUCOSE            RADIOLOGY & ADDITIONAL TESTS:  ACC: [id number] EXAM:  XR SHOULDER COMP MIN 2V LT                          PROCEDURE DATE:  01/29/2022          INTERPRETATION:  History: Left-sided shoulder pain.    Single view of the shoulder was performed.    IMPRESSION:    Study is limited by the lack of orthogonal view. No displaced fracture is   demonstrated. Consider repeat radiographs when clinically feasible. There   is mild acromioclavicular joint arthrosis.    --- End of Report ---            RICARDO ABDULLAHI MD; Attending Radiologist  This document has been electronically signed. Jan 29 2022 10:54AM    ACC: [id number] EXAM:  XR CHEST PORTABLE URGENT 1V                          PROCEDURE DATE:  01/28/2022          INTERPRETATION:  CLINICAL STATEMENT: Chest Pain.    TECHNIQUE: AP view of the chest.    COMPARISON: 1/2/2022    FINDINGS/  IMPRESSION:  New nonspecific airspace opacities lung bases. Correlate clinically and   follow-up recommended as clinically warranted.    No pleural effusion    Heart size cannot be accurately assessed in this projection.    --- End of Report ---            JANY JOHNSON MD; Attending Radiologist  This document has been electronically signed. Jan 28 2022  7:21PM    ACC: [id number] EXAM:  CT BRAIN                          PROCEDURE DATE:  01/28/2022          INTERPRETATION:  Exam Date: 1/28/2022 6:11 PM    CT head without IV contrast    CLINICAL INFORMATION:  ams    TECHNIQUE: Contiguous axial sections were obtained through the head.     Coronal and sagittal reformats were obtained.    COMPARISON: CT head 7/21/2021.    FINDINGS:    There is no evidence of intraparenchymal or extraaxial hemorrhage.     There is no CT evidence of large vessel acute infarct. Nomass effect is   found in the brain.  No evidence of midline shift or herniation pattern.    The ventricles, sulci and basal cisterns appear unremarkable.    Complete opacification of the right maxillary sinus with hyperattenuating   secretions, suggestive of chronic right maxillary sinusitis.    IMPRESSION:    No acute intracranial findings.    Complete opacification of the right maxillary sinus with hyperattenuating   secretions, suggestive of chronic right maxillary sinusitis.    --- End of Report ---            NIGEL RODRIGUEZ MD; Attending Radiologist  This document has been electronically signed. Jan 28 2022  6:21PM      Imaging  Reviewed:  YES/NO    Consultant(s) Notes Reviewed:   YES/ No      Plan of care was discussed with patient and /or primary care giver; all questions and concerns were addressed

## 2022-02-02 NOTE — DIETITIAN INITIAL EVALUATION ADULT. - PROBLEM SELECTOR PLAN 2
Pt coming in with AMS  CXR with New nonspecific airspace opacities lung bases  COVID PCR positive  Will start on decadron   Will continue with supplemental oxygen, increase support as needed  Will start on full dose lovenox as Dimer>2ULN  f/u inflamatory markers including ferritin, crp  f/u and trend d-dimer  Monitor EKG daily for QTC  Consider early proning if pt starts to desaturate on NRB  Low threshold for ICU consultation if pt desaturates

## 2022-02-02 NOTE — DIETITIAN INITIAL EVALUATION ADULT. - PERTINENT MEDS FT
MEDICATIONS  (STANDING):  cyanocobalamin 1000 MICROGram(s) Oral daily  enoxaparin Injectable 60 milliGRAM(s) SubCutaneous two times a day  furosemide    Tablet 20 milliGRAM(s) Oral daily  lactulose Syrup 10 Gram(s) Oral two times a day  multivitamin 1 Tablet(s) Oral daily  nystatin Powder 1 Application(s) Topical three times a day  pantoprazole    Tablet 40 milliGRAM(s) Oral before breakfast  polyethylene glycol 3350 17 Gram(s) Oral daily  propranolol 10 milliGRAM(s) Oral daily  thiamine 100 milliGRAM(s) Oral daily  trimethoprim  160 mG/sulfamethoxazole 800 mG 1 Tablet(s) Oral two times a day    MEDICATIONS  (PRN):

## 2022-02-02 NOTE — DIETITIAN INITIAL EVALUATION ADULT. - PROBLEM SELECTOR PLAN 1
Presented with AMS  hepatic encephalopathy vs covid encephalopathy vs metabolic vs infectious   Serum ammonia: 30  Electrolytes wnl  CT head: No acute intracranial findings.  Ua+  Follow urine culture  Covid + since 1/10/22  Started on ceftriaxone   Continue to monitor

## 2022-02-02 NOTE — PROGRESS NOTE ADULT - PROBLEM SELECTOR PLAN 1
Acute encephalopathy-improved clinically.   hepatic encephalopathy vs covid encephalopathy vs metabolic vs infectious   Covid + since 1/10/22  Started on ceftriaxone  c/w lactulose, goal 3 soft bm per day   Continue to monitor.
Acute encephalopathy-improved clinically.   hepatic encephalopathy vs covid encephalopathy vs metabolic vs infectious   Covid + since 1/10/22  Started on ceftriaxone  c/w lactulose, goal 3 soft bm per day   Continue to monitor.
-  Acute encephalopathy-improved clinically.   -  hepatic encephalopathy vs covid encephalopathy vs metabolic vs infectious   -  Covid + since 1/10/22  -  Continue with lactulose, goal 3 soft bowel movements per day   -  Continue to monitor.

## 2022-02-02 NOTE — PROGRESS NOTE ADULT - PROBLEM SELECTOR PLAN 6
Plan: Left shoulder and hip pain   Lt shoulder and hip xray- negative for acute fracture  HX cervical spinal stenosis, underwent decompression 7/2021.   PT consulted.  f/u recommendations
-  Plan: Left shoulder and hip pain   -  Lt shoulder and hip xray- negative for acute fracture  -  HX cervical spinal stenosis, underwent decompression 7/2021.   -  PT consulted;  recommends subacute rehab
Plan: Left shoulder and hip pain   Lt shoulder and hip xray- negative for acute fracture  HX cervical spinal stenosis, underwent decompression 7/2021.   PT consulted.  f/u recommendations

## 2022-02-02 NOTE — DIETITIAN INITIAL EVALUATION ADULT. - ORAL INTAKE PTA/DIET HISTORY
Regular, Regular, thin liquids. Ensure plus BID. Regular, Regular, thin liquids. Ensure plus BID.  NKFA, NKFI. NH diet order - Regular, thin liquids. Ensure plus BID.  NKFA, NKFI.

## 2022-02-02 NOTE — PROGRESS NOTE ADULT - PROBLEM SELECTOR PLAN 4
BUN/Cr: 25/1  Avoid nephrotoxins   Follow BMP.
-  BUN / Creatinine   31 / 0.95  -  Avoid nephrotoxins   -  Follow BMP daily
BUN/Cr: 25/1  Avoid nephrotoxins   Follow BMP.

## 2022-02-02 NOTE — PROGRESS NOTE ADULT - PROBLEM SELECTOR PLAN 3
-  Continue with Lasix 20mg daily  -  Continue with Lactulose  -  Hold lactulose for more than 3 bowel movements in a 24hr period  -  Continue to monitor mental status
takes propranolol 10, lasix 20, lactulose  c/w home meds.  Hold lactulose for >3BM.
takes propranolol 10, lasix 20, lactulose  c/w home meds.  Hold lactulose for >3BM.

## 2022-02-02 NOTE — DIETITIAN INITIAL EVALUATION ADULT. - PROBLEM SELECTOR PLAN 7
Plan: had plastic stent placed in 7/2021, needs replacement done by Dr. Neftaly Mckeon    AST/ALT 40/24  Bili wnl  known gall bladder disease  Was recommended to follow with Dr. Mckeon for further EHL and stone removal on previous admission

## 2022-02-02 NOTE — DISCHARGE NOTE NURSING/CASE MANAGEMENT/SOCIAL WORK - PATIENT PORTAL LINK FT
You can access the FollowMyHealth Patient Portal offered by [facility] by registering at the following website: [email or web address]. By joining "[employer]"’s FollowMyHealth portal, you will also be able to view your health information using other applications (apps) compatible with our system.

## 2022-02-02 NOTE — PROGRESS NOTE ADULT - PROBLEM SELECTOR PLAN 2
c/w decadron   asymptomatic on RA saturating 95%  c/w full dose lovenox as Dimer>2ULN  d-dimer trending down
c/w decadron   asymptomatic on RA saturating 95%  c/w full dose Lovenox as Dimer>2ULN  d-dimer trending down
-  Continue with  decadron   -  asymptomatic on RA saturating 100%  -  Continue with full dose Lovenox  -  d-dimer trending down

## 2022-02-02 NOTE — DISCHARGE NOTE NURSING/CASE MANAGEMENT/SOCIAL WORK - NSDCPEFALRISK_GEN_ALL_CORE
For information on Fall & Injury Prevention, visit: [email or web address] OR  [email or web address] OR  https://www.cdc.gov/steadi/patient.html

## 2022-02-03 ENCOUNTER — TRANSCRIPTION ENCOUNTER (OUTPATIENT)
Age: 82
End: 2022-02-03

## 2022-02-03 VITALS
TEMPERATURE: 98 F | OXYGEN SATURATION: 95 % | RESPIRATION RATE: 18 BRPM | SYSTOLIC BLOOD PRESSURE: 121 MMHG | HEART RATE: 71 BPM | DIASTOLIC BLOOD PRESSURE: 73 MMHG

## 2022-02-03 DIAGNOSIS — N39.0 URINARY TRACT INFECTION, SITE NOT SPECIFIED: ICD-10-CM

## 2022-02-03 LAB
ANION GAP SERPL CALC-SCNC: 8 MMOL/L — SIGNIFICANT CHANGE UP (ref 5–17)
BUN SERPL-MCNC: 33 MG/DL — HIGH (ref 7–18)
CALCIUM SERPL-MCNC: 8.9 MG/DL — SIGNIFICANT CHANGE UP (ref 8.4–10.5)
CHLORIDE SERPL-SCNC: 102 MMOL/L — SIGNIFICANT CHANGE UP (ref 96–108)
CO2 SERPL-SCNC: 28 MMOL/L — SIGNIFICANT CHANGE UP (ref 22–31)
CREAT SERPL-MCNC: 1.11 MG/DL — SIGNIFICANT CHANGE UP (ref 0.5–1.3)
GLUCOSE SERPL-MCNC: 110 MG/DL — HIGH (ref 70–99)
HCT VFR BLD CALC: 36.1 % — LOW (ref 39–50)
HGB BLD-MCNC: 11.4 G/DL — LOW (ref 13–17)
MCHC RBC-ENTMCNC: 25 PG — LOW (ref 27–34)
MCHC RBC-ENTMCNC: 31.6 GM/DL — LOW (ref 32–36)
MCV RBC AUTO: 79.2 FL — LOW (ref 80–100)
NRBC # BLD: 0 /100 WBCS — SIGNIFICANT CHANGE UP (ref 0–0)
PLATELET # BLD AUTO: 232 K/UL — SIGNIFICANT CHANGE UP (ref 150–400)
POTASSIUM SERPL-MCNC: 4.4 MMOL/L — SIGNIFICANT CHANGE UP (ref 3.5–5.3)
POTASSIUM SERPL-SCNC: 4.4 MMOL/L — SIGNIFICANT CHANGE UP (ref 3.5–5.3)
RBC # BLD: 4.56 M/UL — SIGNIFICANT CHANGE UP (ref 4.2–5.8)
RBC # FLD: 18.2 % — HIGH (ref 10.3–14.5)
SARS-COV-2 RNA SPEC QL NAA+PROBE: DETECTED
SODIUM SERPL-SCNC: 138 MMOL/L — SIGNIFICANT CHANGE UP (ref 135–145)
WBC # BLD: 9.14 K/UL — SIGNIFICANT CHANGE UP (ref 3.8–10.5)
WBC # FLD AUTO: 9.14 K/UL — SIGNIFICANT CHANGE UP (ref 3.8–10.5)

## 2022-02-03 PROCEDURE — 84100 ASSAY OF PHOSPHORUS: CPT

## 2022-02-03 PROCEDURE — 85379 FIBRIN DEGRADATION QUANT: CPT

## 2022-02-03 PROCEDURE — 85027 COMPLETE CBC AUTOMATED: CPT

## 2022-02-03 PROCEDURE — 87635 SARS-COV-2 COVID-19 AMP PRB: CPT

## 2022-02-03 PROCEDURE — 73030 X-RAY EXAM OF SHOULDER: CPT

## 2022-02-03 PROCEDURE — 84484 ASSAY OF TROPONIN QUANT: CPT

## 2022-02-03 PROCEDURE — 83615 LACTATE (LD) (LDH) ENZYME: CPT

## 2022-02-03 PROCEDURE — 81001 URINALYSIS AUTO W/SCOPE: CPT

## 2022-02-03 PROCEDURE — 70450 CT HEAD/BRAIN W/O DYE: CPT | Mod: MA

## 2022-02-03 PROCEDURE — 85025 COMPLETE CBC W/AUTO DIFF WBC: CPT

## 2022-02-03 PROCEDURE — 36415 COLL VENOUS BLD VENIPUNCTURE: CPT

## 2022-02-03 PROCEDURE — 73502 X-RAY EXAM HIP UNI 2-3 VIEWS: CPT

## 2022-02-03 PROCEDURE — 80048 BASIC METABOLIC PNL TOTAL CA: CPT

## 2022-02-03 PROCEDURE — 87186 SC STD MICRODIL/AGAR DIL: CPT

## 2022-02-03 PROCEDURE — 93005 ELECTROCARDIOGRAM TRACING: CPT

## 2022-02-03 PROCEDURE — 87086 URINE CULTURE/COLONY COUNT: CPT

## 2022-02-03 PROCEDURE — 82728 ASSAY OF FERRITIN: CPT

## 2022-02-03 PROCEDURE — 83735 ASSAY OF MAGNESIUM: CPT

## 2022-02-03 PROCEDURE — 99285 EMERGENCY DEPT VISIT HI MDM: CPT | Mod: 25

## 2022-02-03 PROCEDURE — 84145 PROCALCITONIN (PCT): CPT

## 2022-02-03 PROCEDURE — 82140 ASSAY OF AMMONIA: CPT

## 2022-02-03 PROCEDURE — 80053 COMPREHEN METABOLIC PANEL: CPT

## 2022-02-03 PROCEDURE — 71045 X-RAY EXAM CHEST 1 VIEW: CPT

## 2022-02-03 RX ORDER — ASPIRIN/CALCIUM CARB/MAGNESIUM 324 MG
1 TABLET ORAL
Qty: 60 | Refills: 0
Start: 2022-02-03 | End: 2022-04-03

## 2022-02-03 RX ORDER — NYSTATIN CREAM 100000 [USP'U]/G
1 CREAM TOPICAL
Qty: 0 | Refills: 0 | DISCHARGE
Start: 2022-02-03

## 2022-02-03 RX ORDER — ZINC OXIDE 200 MG/G
1 OINTMENT TOPICAL
Qty: 0 | Refills: 0 | DISCHARGE

## 2022-02-03 RX ORDER — DICLOFENAC SODIUM 30 MG/G
2 GEL TOPICAL
Qty: 0 | Refills: 0 | DISCHARGE

## 2022-02-03 RX ORDER — CYPROHEPTADINE HYDROCHLORIDE 4 MG/1
1 TABLET ORAL
Qty: 0 | Refills: 0 | DISCHARGE

## 2022-02-03 RX ADMIN — NYSTATIN CREAM 1 APPLICATION(S): 100000 CREAM TOPICAL at 06:02

## 2022-02-03 RX ADMIN — Medication 20 MILLIGRAM(S): at 06:01

## 2022-02-03 RX ADMIN — Medication 1 TABLET(S): at 06:02

## 2022-02-03 RX ADMIN — ENOXAPARIN SODIUM 60 MILLIGRAM(S): 100 INJECTION SUBCUTANEOUS at 06:01

## 2022-02-03 RX ADMIN — Medication 100 MILLIGRAM(S): at 11:47

## 2022-02-03 RX ADMIN — PREGABALIN 1000 MICROGRAM(S): 225 CAPSULE ORAL at 11:47

## 2022-02-03 RX ADMIN — PANTOPRAZOLE SODIUM 40 MILLIGRAM(S): 20 TABLET, DELAYED RELEASE ORAL at 06:02

## 2022-02-03 RX ADMIN — LACTULOSE 10 GRAM(S): 10 SOLUTION ORAL at 06:02

## 2022-02-03 RX ADMIN — POLYETHYLENE GLYCOL 3350 17 GRAM(S): 17 POWDER, FOR SOLUTION ORAL at 11:47

## 2022-02-03 RX ADMIN — Medication 1 TABLET(S): at 11:47

## 2022-02-03 NOTE — DISCHARGE NOTE PROVIDER - DETAILS OF MALNUTRITION DIAGNOSIS/DIAGNOSES
This patient has been assessed with a concern for Malnutrition and was treated during this hospitalization for the following Nutrition diagnosis/diagnoses:     -  02/02/2022: Severe protein-calorie malnutrition   -  02/02/2022: Underweight (BMI < 19)

## 2022-02-03 NOTE — DISCHARGE NOTE PROVIDER - NSDCMRMEDTOKEN_GEN_ALL_CORE_FT
acetaminophen 325 mg oral tablet: 2 tab(s) orally every 6 hours, As needed, Mild Pain (1 - 3), Moderate Pain (4 - 6), Severe Pain (7 - 10)  cyanocobalamin 1000 mcg oral tablet: 1 tab(s) orally once a day  famotidine 40 mg oral tablet: 1 tab(s) orally 2 times a day  furosemide 20 mg oral tablet: 1 tab(s) orally once a day  lactulose 10 g/15 mL oral syrup: 15 milliliter(s) orally 2 times a day -3 BMs daily  Multiple Vitamins oral tablet: 1 tab(s) orally once a day  nystatin 100,000 units/g topical powder: 1 application topically 3 times a day  nystatin 100,000 units/g topical powder (obsolete): Apply topically to affected area 3 times a day  pantoprazole 40 mg oral delayed release tablet: 1 tab(s) orally once a day (before a meal)  Periactin 4 mg oral tablet: 1 tab(s) orally once a day  polyethylene glycol 3350 oral powder for reconstitution: 17 gram(s) orally once a day  propranolol 10 mg oral tablet: 1 tab(s) orally once a day  sulfamethoxazole-trimethoprim 800 mg-160 mg oral tablet: 1 tab(s) orally 2 times a day  thiamine 100 mg oral tablet: 1 tab(s) orally once a day   acetaminophen 325 mg oral tablet: 2 tab(s) orally every 6 hours, As needed, Mild Pain (1 - 3), Moderate Pain (4 - 6), Severe Pain (7 - 10)  Aspirin Enteric Coated 81 mg oral delayed release tablet: 1 tab(s) orally once a day   cyanocobalamin 1000 mcg oral tablet: 1 tab(s) orally once a day  famotidine 40 mg oral tablet: 1 tab(s) orally 2 times a day  furosemide 20 mg oral tablet: 1 tab(s) orally once a day  lactulose 10 g/15 mL oral syrup: 15 milliliter(s) orally 2 times a day -3 BMs daily  Multiple Vitamins oral tablet: 1 tab(s) orally once a day  nystatin 100,000 units/g topical powder: 1 application topically 3 times a day  nystatin 100,000 units/g topical powder (obsolete): Apply topically to affected area 3 times a day  pantoprazole 40 mg oral delayed release tablet: 1 tab(s) orally once a day (before a meal)  polyethylene glycol 3350 oral powder for reconstitution: 17 gram(s) orally once a day  propranolol 10 mg oral tablet: 1 tab(s) orally once a day  sulfamethoxazole-trimethoprim 800 mg-160 mg oral tablet: 1 tab(s) orally 2 times a day  thiamine 100 mg oral tablet: 1 tab(s) orally once a day

## 2022-02-03 NOTE — PATIENT PROFILE ADULT - FUNCTIONAL ASSESSMENT - DAILY ACTIVITY 3.
No new care gaps identified.  Powered by [employer]. Reference number: [id number].   2/02/2022 6:43:05 PM CST   1 = Total assistance

## 2022-02-03 NOTE — DISCHARGE NOTE PROVIDER - EXTENDED VTE OTHER REASON FREE TEXT
As per attending, Ddimer 345 and trending down.  Anticoagulation not necessary.  will order ASA 81mg daily

## 2022-02-03 NOTE — CHART NOTE - NSCHARTNOTEFT_GEN_A_CORE
Patient's emergency contact called and notified of pending transfer to [facility] ED  asked NP to call her mom because [place]  patient's spouse called as well and informed of transfer.

## 2022-02-03 NOTE — DISCHARGE NOTE PROVIDER - NSDCCPCAREPLAN_GEN_ALL_CORE_FT
PRINCIPAL DISCHARGE DIAGNOSIS  Diagnosis: Acute encephalopathy  Assessment and Plan of Treatment:   WHAT YOU NEED TO KNOW:  You were admitted with altered mental status.   Altered mental status (AMS) is a disruption in how your brain works that causes a change in behavior. This change can happen suddenly or over days. AMS ranges from slight confusion to total disorientation and increased sleepiness to coma.  You are now back to your baseline mental status and ready for discharge.        SECONDARY DISCHARGE DIAGNOSES  Diagnosis: Acute UTI  Assessment and Plan of Treatment: You were found to have an UTI during this hospitalization.  A urinary tract infection (UTI) is caused by bacteria that get inside your urinary tract. Most bacteria that enter your urinary tract come out when you urinate. If the bacteria stay in your urinary tract, you may get an infection. Your urinary tract includes your kidneys, ureters, bladder, and urethra. Urine is made in your kidneys, and it flows from the ureters to the bladder. Urine leaves the bladder through the urethra. A UTI is more common in your lower urinary tract, which includes your bladder and urethra.  You should continue with your prescribed antibiotics as ordered.      Diagnosis: Acute UTI  Assessment and Plan of Treatment: You were found to have an UTI during this hospitalization.  A urinary tract infection (UTI) is caused by bacteria that get inside your urinary tract. Most bacteria that enter your urinary tract come out when you urinate. If the bacteria stay in your urinary tract, you may get an infection. Your urinary tract includes your kidneys, ureters, bladder, and urethra. Urine is made in your kidneys, and it flows from the ureters to the bladder. Urine leaves the bladder through the urethra. A UTI is more common in your lower urinary tract, which includes your bladder and urethra.  You should continue with your prescribed antibiotics as ordered.      Diagnosis: Chronic kidney disease (CKD)  Assessment and Plan of Treatment: Chronic kidney disease (CKD) is the gradual and permanent loss of kidney function. It is also called chronic kidney failure, or chronic renal insufficiency. Normally, the kidneys remove fluid, chemicals, and waste from your blood. These wastes are turned into urine by your kidneys. CKD may worsen over time and lead to kidney failure. Your CKD team will help you and your family plan for your care at home. The team will help you create goals and find ways to meet your goals. Your care plan may change over time as your needs change.    Diagnosis: Encephalopathy due to COVID-19 virus  Assessment and Plan of Treatment:

## 2022-02-03 NOTE — DISCHARGE NOTE PROVIDER - NSDCFUSCHEDAPPT_GEN_ALL_CORE_FT
KETAN TIPTON ; 02/14/2022 ; NPP Neuro [address] [facility]  KETAN TIPTON ; 03/01/2022 ; NPP Hepatology [facility] [place]

## 2022-02-12 NOTE — H&P ADULT - GUM GEN PE MLT EXAM PC
I left a message asking patient to call back 
I left a message asking patient to call back 
I left a message with the recommendation 
I reviewed the results with patient 
I reviewed the results with patient  She asked if she should take any iron supplement, or anything else besides the multivitamin she already takes?
not examined
Lymphadenitis

## 2022-02-14 ENCOUNTER — APPOINTMENT (OUTPATIENT)
Dept: NEUROLOGY | Facility: CLINIC | Age: 82
End: 2022-02-14

## 2022-03-01 ENCOUNTER — APPOINTMENT (OUTPATIENT)
Dept: HEPATOLOGY | Facility: CLINIC | Age: 82
End: 2022-03-01

## 2022-03-15 NOTE — ED PROVIDER NOTE - CONSULTING PHYSICIAN
Attempted to reach Pt. \"Caller's phone is not accepting calls at this time, please try again later.\"   Orthopedics

## 2022-04-05 NOTE — ED PROVIDER NOTE - CONSTITUTIONAL, MLM
[employer] dose 1, 2, and 3 normal... Well appearing, awake, alert, oriented to person, place, time/situation and in no apparent distress.

## 2022-04-06 NOTE — ED ADULT NURSE NOTE - DOES PATIENT HAVE ADVANCE DIRECTIVE
Due to recent symptoms that are suggestive of having low blood sugars but we have not confirmed that the symptoms are due to low blood sugars. I will recommend the following.     We will do a 3 day continuous glucose monitoring to have a better idea if indeed your symptoms are due to low  blood sugars.     With the glucose sensor study we will be able to see if you still need to take the Actos since your sugar average is at the desired range.       During your glucose sensor study we want you to eat and do the things you normally due.      Once I have results of the study I will call you to go over next steps to follow.     If any questions feel free to contact me.     Have a nice day.     Sincerely,       Azam Lemons MD   Endocrinology   4/6/2022 4:03 PM             
No

## 2022-04-19 ENCOUNTER — INPATIENT (INPATIENT)
Facility: HOSPITAL | Age: 82
LOS: 6 days | Discharge: NURSING, REHAB OR LONG-TERM CARE | DRG: 432 | End: 2022-04-26
Attending: HOSPITALIST | Admitting: HOSPITALIST
Payer: MEDICARE

## 2022-04-19 VITALS
RESPIRATION RATE: 17 BRPM | WEIGHT: 149.91 LBS | TEMPERATURE: 98 F | SYSTOLIC BLOOD PRESSURE: 119 MMHG | HEART RATE: 62 BPM | DIASTOLIC BLOOD PRESSURE: 70 MMHG | OXYGEN SATURATION: 98 % | HEIGHT: 60.8 IN

## 2022-04-19 DIAGNOSIS — Z98.890 OTHER SPECIFIED POSTPROCEDURAL STATES: Chronic | ICD-10-CM

## 2022-04-19 DIAGNOSIS — Z96.649 PRESENCE OF UNSPECIFIED ARTIFICIAL HIP JOINT: Chronic | ICD-10-CM

## 2022-04-19 LAB
ALBUMIN SERPL ELPH-MCNC: 2 G/DL — LOW (ref 3.5–5)
ALP SERPL-CCNC: 484 U/L — HIGH (ref 40–120)
ALT FLD-CCNC: 23 U/L DA — SIGNIFICANT CHANGE UP (ref 10–60)
ANION GAP SERPL CALC-SCNC: 5 MMOL/L — SIGNIFICANT CHANGE UP (ref 5–17)
AST SERPL-CCNC: 50 U/L — HIGH (ref 10–40)
BASOPHILS # BLD AUTO: 0.06 K/UL — SIGNIFICANT CHANGE UP (ref 0–0.2)
BASOPHILS NFR BLD AUTO: 1 % — SIGNIFICANT CHANGE UP (ref 0–2)
BILIRUB SERPL-MCNC: 0.7 MG/DL — SIGNIFICANT CHANGE UP (ref 0.2–1.2)
BUN SERPL-MCNC: 15 MG/DL — SIGNIFICANT CHANGE UP (ref 7–18)
CALCIUM SERPL-MCNC: 9 MG/DL — SIGNIFICANT CHANGE UP (ref 8.4–10.5)
CHLORIDE SERPL-SCNC: 109 MMOL/L — HIGH (ref 96–108)
CO2 SERPL-SCNC: 27 MMOL/L — SIGNIFICANT CHANGE UP (ref 22–31)
CREAT SERPL-MCNC: 0.94 MG/DL — SIGNIFICANT CHANGE UP (ref 0.5–1.3)
EGFR: 81 ML/MIN/1.73M2 — SIGNIFICANT CHANGE UP
EOSINOPHIL # BLD AUTO: 0.28 K/UL — SIGNIFICANT CHANGE UP (ref 0–0.5)
EOSINOPHIL NFR BLD AUTO: 4.5 % — SIGNIFICANT CHANGE UP (ref 0–6)
GLUCOSE SERPL-MCNC: 98 MG/DL — SIGNIFICANT CHANGE UP (ref 70–99)
HCT VFR BLD CALC: 35.4 % — LOW (ref 39–50)
HGB BLD-MCNC: 11.2 G/DL — LOW (ref 13–17)
IMM GRANULOCYTES NFR BLD AUTO: 0.5 % — SIGNIFICANT CHANGE UP (ref 0–1.5)
LACTATE SERPL-SCNC: 1.1 MMOL/L — SIGNIFICANT CHANGE UP (ref 0.7–2)
LIDOCAIN IGE QN: 81 U/L — SIGNIFICANT CHANGE UP (ref 73–393)
LYMPHOCYTES # BLD AUTO: 1.33 K/UL — SIGNIFICANT CHANGE UP (ref 1–3.3)
LYMPHOCYTES # BLD AUTO: 21.6 % — SIGNIFICANT CHANGE UP (ref 13–44)
MCHC RBC-ENTMCNC: 24.5 PG — LOW (ref 27–34)
MCHC RBC-ENTMCNC: 31.6 GM/DL — LOW (ref 32–36)
MCV RBC AUTO: 77.5 FL — LOW (ref 80–100)
MONOCYTES # BLD AUTO: 0.61 K/UL — SIGNIFICANT CHANGE UP (ref 0–0.9)
MONOCYTES NFR BLD AUTO: 9.9 % — SIGNIFICANT CHANGE UP (ref 2–14)
NEUTROPHILS # BLD AUTO: 3.85 K/UL — SIGNIFICANT CHANGE UP (ref 1.8–7.4)
NEUTROPHILS NFR BLD AUTO: 62.5 % — SIGNIFICANT CHANGE UP (ref 43–77)
NRBC # BLD: 0 /100 WBCS — SIGNIFICANT CHANGE UP (ref 0–0)
PLATELET # BLD AUTO: 227 K/UL — SIGNIFICANT CHANGE UP (ref 150–400)
POTASSIUM SERPL-MCNC: 4.3 MMOL/L — SIGNIFICANT CHANGE UP (ref 3.5–5.3)
POTASSIUM SERPL-SCNC: 4.3 MMOL/L — SIGNIFICANT CHANGE UP (ref 3.5–5.3)
PROT SERPL-MCNC: 6.3 G/DL — SIGNIFICANT CHANGE UP (ref 6–8.3)
RBC # BLD: 4.57 M/UL — SIGNIFICANT CHANGE UP (ref 4.2–5.8)
RBC # FLD: 18.6 % — HIGH (ref 10.3–14.5)
SARS-COV-2 RNA SPEC QL NAA+PROBE: SIGNIFICANT CHANGE UP
SODIUM SERPL-SCNC: 141 MMOL/L — SIGNIFICANT CHANGE UP (ref 135–145)
WBC # BLD: 6.16 K/UL — SIGNIFICANT CHANGE UP (ref 3.8–10.5)
WBC # FLD AUTO: 6.16 K/UL — SIGNIFICANT CHANGE UP (ref 3.8–10.5)

## 2022-04-19 PROCEDURE — 99285 EMERGENCY DEPT VISIT HI MDM: CPT

## 2022-04-19 RX ORDER — CEFTRIAXONE 500 MG/1
2000 INJECTION, POWDER, FOR SOLUTION INTRAMUSCULAR; INTRAVENOUS ONCE
Refills: 0 | Status: COMPLETED | OUTPATIENT
Start: 2022-04-19 | End: 2022-04-19

## 2022-04-19 RX ORDER — SODIUM CHLORIDE 9 MG/ML
1000 INJECTION INTRAMUSCULAR; INTRAVENOUS; SUBCUTANEOUS ONCE
Refills: 0 | Status: COMPLETED | OUTPATIENT
Start: 2022-04-19 | End: 2022-04-19

## 2022-04-19 RX ADMIN — CEFTRIAXONE 100 MILLIGRAM(S): 500 INJECTION, POWDER, FOR SOLUTION INTRAMUSCULAR; INTRAVENOUS at 20:54

## 2022-04-19 RX ADMIN — SODIUM CHLORIDE 1000 MILLILITER(S): 9 INJECTION INTRAMUSCULAR; INTRAVENOUS; SUBCUTANEOUS at 20:54

## 2022-04-19 NOTE — ED PROVIDER NOTE - CLINICAL SUMMARY MEDICAL DECISION MAKING FREE TEXT BOX
81 y/o male with concern for spontaneous bacterial peritonitis. Will get CT scan, blood work and reassess.

## 2022-04-19 NOTE — ED PROVIDER NOTE - NSICDXPASTMEDICALHX_GEN_ALL_CORE_FT
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

## 2022-04-19 NOTE — ED ADULT NURSE NOTE - OBJECTIVE STATEMENT
pt presents to ED with complaints of Diffuse abd pain and dark stool x last night ,non healing wound to r foot.  Hx of GBS as per pt. Denies chest pain.

## 2022-04-19 NOTE — ED PROVIDER NOTE - OBJECTIVE STATEMENT
81 y/o male with PMHx ascites, CHF presents to ED c/o abdominal pain. Patient states has had fever, abdominal pain, dizziness and has been bedridden x3-4 days. Patient notes dark stool x3-4 days. Patient notes pain under rib last night x11 PM similar to pain last January when patient was admitted for GI bleed. Patient notes stage 2 ulcer on heel. NKDA.

## 2022-04-19 NOTE — ED PROVIDER NOTE - DATE/TIME 1
OVERNIGHT EVENTS: JERO     SUBJECTIVE: no complaints  CV stable    Vital Signs Last 12 Hrs  T(F): 98.3 (02-10-19 @ 04:49), Max: 98.8 (02-09-19 @ 20:39)  HR: 77 (02-10-19 @ 04:49) (77 - 86)  BP: 158/85 (02-10-19 @ 04:49) (150/82 - 158/85)  RR: 18 (02-10-19 @ 04:49) (18 - 19)  SpO2: 98% (02-10-19 @ 04:49) (97% - 98%)  I&O's Summary    08 Feb 2019 07:01  -  09 Feb 2019 07:00  --------------------------------------------------------  IN: 960 mL / OUT: 0 mL / NET: 960 mL        PHYSICAL EXAM:  Constitutional: pleasantly confused female in NAD, comfortable in bed.  HEENT: NC/AT, PERRLA, EOMI, no conjunctival pallor or scleral icterus, MMM  Neck: Supple, no JVD  Respiratory: Normal rate, rhythm, depth, effort. CTAB. No w/r/r.   Cardiovascular: RRR, normal S1 and S2, no m/r/g.   Gastrointestinal: +BS, soft NTND, no guarding or rebound tenderness, no palpable masses   Extremities: wwp; no cyanosis, clubbing or edema.   Vascular: Pulses equal and strong throughout.   Neurological: AAOx1 (self), no CN deficits, strength and sensation intact throughout.   Skin: No gross skin abnormalities or rashes        LABS:                        9.4    12.03 )-----------( 391      ( 09 Feb 2019 08:33 )             30.8     02-09    141  |  110<H>  |  22  ----------------------------<  99  5.0   |  18<L>  |  1.39<H>    Ca    9.6      09 Feb 2019 08:33  Mg     1.9     02-09            RADIOLOGY & ADDITIONAL TESTS:    MEDICATIONS  (STANDING):  aspirin enteric coated 81 milliGRAM(s) Oral daily  atorvastatin 10 milliGRAM(s) Oral at bedtime  cefTRIAXone   IVPB 1 Gram(s) IV Intermittent every 24 hours  donepezil 5 milliGRAM(s) Oral at bedtime  ferrous    sulfate 325 milliGRAM(s) Oral daily  lacosamide 200 milliGRAM(s) Oral two times a day  levETIRAcetam 500 milliGRAM(s) Oral two times a day  memantine 10 milliGRAM(s) Oral daily  pantoprazole    Tablet 40 milliGRAM(s) Oral before breakfast  QUEtiapine 12.5 milliGRAM(s) Oral daily  sodium chloride 0.9%. 1000 milliLiter(s) (80 mL/Hr) IV Continuous <Continuous>    MEDICATIONS  (PRN): 20-Apr-2022 05:47

## 2022-04-19 NOTE — ED PROVIDER NOTE - PROGRESS NOTE DETAILS
patient signedout to me by Dr. Machado, awaiting CT A/P.  CT shows 1. Interval development of large amount of ascites throughout the abdomenpelvis.2. Underlying cirrhotic liver.3. Biliary stent as noted.4. Soft tissue mass within the extrahepatic biliary tree, with diameter of 2.6cm.5. Persistent mild intrahepatic bile duct dilatation.6. 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 Mild gastritis.8. Right abdominal wall organizing hematoma measuring 4 x 1.86 x 7.23 cm.    surgical house officer MARIAM Givens consulted who recommends no surgical intervention at this time.  Discussed above with Dr. Hemphill who agrees with plan for admission.  ~Charlene WEIR
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
## 2022-04-19 NOTE — ED ADULT NURSE NOTE - NSICDXPASTMEDICALHX_GEN_ALL_CORE_FT
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

## 2022-04-20 DIAGNOSIS — N39.0 URINARY TRACT INFECTION, SITE NOT SPECIFIED: ICD-10-CM

## 2022-04-20 DIAGNOSIS — K74.60 UNSPECIFIED CIRRHOSIS OF LIVER: ICD-10-CM

## 2022-04-20 DIAGNOSIS — R18.8 OTHER ASCITES: ICD-10-CM

## 2022-04-20 DIAGNOSIS — K83.1 OBSTRUCTION OF BILE DUCT: ICD-10-CM

## 2022-04-20 DIAGNOSIS — K80.50 CALCULUS OF BILE DUCT WITHOUT CHOLANGITIS OR CHOLECYSTITIS WITHOUT OBSTRUCTION: ICD-10-CM

## 2022-04-20 DIAGNOSIS — K82.8 OTHER SPECIFIED DISEASES OF GALLBLADDER: ICD-10-CM

## 2022-04-20 DIAGNOSIS — S30.1XXA CONTUSION OF ABDOMINAL WALL, INITIAL ENCOUNTER: ICD-10-CM

## 2022-04-20 DIAGNOSIS — K74.69 OTHER CIRRHOSIS OF LIVER: ICD-10-CM

## 2022-04-20 DIAGNOSIS — Z29.9 ENCOUNTER FOR PROPHYLACTIC MEASURES, UNSPECIFIED: ICD-10-CM

## 2022-04-20 DIAGNOSIS — K92.2 GASTROINTESTINAL HEMORRHAGE, UNSPECIFIED: ICD-10-CM

## 2022-04-20 DIAGNOSIS — D64.9 ANEMIA, UNSPECIFIED: ICD-10-CM

## 2022-04-20 LAB
AMMONIA BLD-MCNC: 34 UMOL/L — HIGH (ref 11–32)
APPEARANCE UR: CLEAR — SIGNIFICANT CHANGE UP
APTT BLD: 35.5 SEC — SIGNIFICANT CHANGE UP (ref 27.5–35.5)
BACTERIA # UR AUTO: ABNORMAL /HPF
BILIRUB UR-MCNC: NEGATIVE — SIGNIFICANT CHANGE UP
COLOR SPEC: YELLOW — SIGNIFICANT CHANGE UP
DIFF PNL FLD: NEGATIVE — SIGNIFICANT CHANGE UP
EPI CELLS # UR: SIGNIFICANT CHANGE UP /HPF
GLUCOSE UR QL: NEGATIVE — SIGNIFICANT CHANGE UP
HCT VFR BLD CALC: 36.8 % — LOW (ref 39–50)
HGB BLD-MCNC: 11.4 G/DL — LOW (ref 13–17)
HYALINE CASTS # UR AUTO: ABNORMAL /LPF
INR BLD: 1.5 RATIO — HIGH (ref 0.88–1.16)
KETONES UR-MCNC: NEGATIVE — SIGNIFICANT CHANGE UP
LEUKOCYTE ESTERASE UR-ACNC: ABNORMAL
MCHC RBC-ENTMCNC: 24.6 PG — LOW (ref 27–34)
MCHC RBC-ENTMCNC: 31 GM/DL — LOW (ref 32–36)
MCV RBC AUTO: 79.5 FL — LOW (ref 80–100)
NITRITE UR-MCNC: POSITIVE
NRBC # BLD: 0 /100 WBCS — SIGNIFICANT CHANGE UP (ref 0–0)
PH UR: 6 — SIGNIFICANT CHANGE UP (ref 5–8)
PLATELET # BLD AUTO: 220 K/UL — SIGNIFICANT CHANGE UP (ref 150–400)
PROT UR-MCNC: NEGATIVE — SIGNIFICANT CHANGE UP
PROTHROM AB SERPL-ACNC: 17.9 SEC — HIGH (ref 10.5–13.4)
RBC # BLD: 4.63 M/UL — SIGNIFICANT CHANGE UP (ref 4.2–5.8)
RBC # FLD: 19.2 % — HIGH (ref 10.3–14.5)
RBC CASTS # UR COMP ASSIST: SIGNIFICANT CHANGE UP /HPF (ref 0–2)
SP GR SPEC: 1.01 — SIGNIFICANT CHANGE UP (ref 1.01–1.02)
UROBILINOGEN FLD QL: NEGATIVE — SIGNIFICANT CHANGE UP
WBC # BLD: 4.58 K/UL — SIGNIFICANT CHANGE UP (ref 3.8–10.5)
WBC # FLD AUTO: 4.58 K/UL — SIGNIFICANT CHANGE UP (ref 3.8–10.5)
WBC UR QL: SIGNIFICANT CHANGE UP /HPF (ref 0–5)

## 2022-04-20 PROCEDURE — 12345: CPT | Mod: NC

## 2022-04-20 PROCEDURE — 99222 1ST HOSP IP/OBS MODERATE 55: CPT

## 2022-04-20 PROCEDURE — 74177 CT ABD & PELVIS W/CONTRAST: CPT | Mod: 26,MA

## 2022-04-20 PROCEDURE — 99223 1ST HOSP IP/OBS HIGH 75: CPT

## 2022-04-20 RX ORDER — PANTOPRAZOLE SODIUM 20 MG/1
40 TABLET, DELAYED RELEASE ORAL EVERY 12 HOURS
Refills: 0 | Status: DISCONTINUED | OUTPATIENT
Start: 2022-04-20 | End: 2022-04-26

## 2022-04-20 RX ORDER — FAMOTIDINE 10 MG/ML
1 INJECTION INTRAVENOUS
Qty: 0 | Refills: 0 | DISCHARGE

## 2022-04-20 RX ORDER — PIPERACILLIN AND TAZOBACTAM 4; .5 G/20ML; G/20ML
3.38 INJECTION, POWDER, LYOPHILIZED, FOR SOLUTION INTRAVENOUS EVERY 8 HOURS
Refills: 0 | Status: DISCONTINUED | OUTPATIENT
Start: 2022-04-20 | End: 2022-04-26

## 2022-04-20 RX ORDER — LACTULOSE 10 G/15ML
10 SOLUTION ORAL
Refills: 0 | Status: DISCONTINUED | OUTPATIENT
Start: 2022-04-20 | End: 2022-04-26

## 2022-04-20 RX ORDER — PIPERACILLIN AND TAZOBACTAM 4; .5 G/20ML; G/20ML
3.38 INJECTION, POWDER, LYOPHILIZED, FOR SOLUTION INTRAVENOUS ONCE
Refills: 0 | Status: COMPLETED | OUTPATIENT
Start: 2022-04-20 | End: 2022-04-20

## 2022-04-20 RX ORDER — PANTOPRAZOLE SODIUM 20 MG/1
40 TABLET, DELAYED RELEASE ORAL
Refills: 0 | Status: DISCONTINUED | OUTPATIENT
Start: 2022-04-20 | End: 2022-04-20

## 2022-04-20 RX ORDER — SPIRONOLACTONE 25 MG/1
50 TABLET, FILM COATED ORAL DAILY
Refills: 0 | Status: DISCONTINUED | OUTPATIENT
Start: 2022-04-20 | End: 2022-04-26

## 2022-04-20 RX ORDER — CEFTRIAXONE 500 MG/1
2000 INJECTION, POWDER, FOR SOLUTION INTRAMUSCULAR; INTRAVENOUS EVERY 24 HOURS
Refills: 0 | Status: DISCONTINUED | OUTPATIENT
Start: 2022-04-20 | End: 2022-04-20

## 2022-04-20 RX ORDER — FUROSEMIDE 40 MG
20 TABLET ORAL DAILY
Refills: 0 | Status: DISCONTINUED | OUTPATIENT
Start: 2022-04-20 | End: 2022-04-26

## 2022-04-20 RX ADMIN — PIPERACILLIN AND TAZOBACTAM 25 GRAM(S): 4; .5 INJECTION, POWDER, LYOPHILIZED, FOR SOLUTION INTRAVENOUS at 22:17

## 2022-04-20 RX ADMIN — PIPERACILLIN AND TAZOBACTAM 200 GRAM(S): 4; .5 INJECTION, POWDER, LYOPHILIZED, FOR SOLUTION INTRAVENOUS at 18:48

## 2022-04-20 RX ADMIN — PANTOPRAZOLE SODIUM 40 MILLIGRAM(S): 20 TABLET, DELAYED RELEASE ORAL at 17:50

## 2022-04-20 RX ADMIN — LACTULOSE 10 GRAM(S): 10 SOLUTION ORAL at 17:50

## 2022-04-20 NOTE — H&P ADULT - HISTORY OF PRESENT ILLNESS
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 Pt is AxO 2 on assessment , not a detailed historian. Pt stated that he has been noticing that for past few days his abdomen is getting bigger and tighter associate with generalized abdominal pain which is mild in intensity. Pt further stated that his wife noticing black-brown stool for past one month. Pt denied fever, nausea , vomiting, diarrhea, hematemesis, headaches, urinary difficulty. Pt doesn't remember whether he has paracentesis or not in the past. Called patient wife multiple time and daughter for collateral hx but no answer.     Of note: pt admitted at [facility] in Jan & Feb for GI bleed and UTI/COVID respectively. For GI bleed pt H/H was stable in the hospital and no bleeding noted in the hospital and no GI intervention was performed. Per prior records last colonoscopy 4/2020 -showing rectal varices, vascular ectasia in ascending in descending colon s/p ablation.     ED Course: Hgb 11 (at his baseline) , no episodes of melena or hematemesis ,  UA neg. CT A/P showed Extrahepatic common duct is enlarged near the gallbladder neck, with internal 2.3 cm masslike density which could reflect a large stone. Common bile duct stent traverses this region. Mild intrahepatic biliary distention with bile duct enhancement. There is mural thickening at the gallbladder neck as well. Infectious and/or neoplastic etiologies for these findings can be considered. Correlate for any signs and symptoms of cholangitis/sepsis. Sequelae of chronic   cholecystitis with fistula to the common duct can be considered as well, as described on prior MRI from 2/8/2021. Cirrhotic liver morphology. Moderate ascites. .Chronic right rectus sheath collection/hematoma, stable in size. High density material within the collection is of unclear significance, not seen on the prior noncontrasted CT from 1/3/2022 Correlate with hemodynamics and hematocrit of the patient. High density material could reflect a prominent tortuous vessel versus other blood products. In the setting of prior intervention, calcified and/or other embolic material   can be considered.      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 Pt is AxO 2 on assessment , not a detailed historian. Pt stated that he has been noticing that for past few days his abdomen is getting bigger and tighter associate with generalized abdominal pain which is mild in intensity. Pt further stated that his wife noticing black-brown stool for past one month. Pt denied fever, nausea , vomiting, diarrhea, hematemesis, headaches, urinary difficulty. Pt doesn't remember whether he has paracentesis or not in the past. Called patient wife multiple time and daughter for collateral hx but no answer.     Of note: pt admitted at [facility] in Jan & Feb for GI bleed and UTI/COVID respectively. For GI bleed pt H/H was stable in the hospital and no bleeding noted in the hospital and no GI intervention was performed. Per prior records last colonoscopy 4/2020 -showing rectal varices, vascular ectasia in ascending in descending colon s/p ablation. Endoscopy 4/20 > The esophagus contained trace varices.                          ED Course: Hgb 11 (at his baseline) , no episodes of melena or hematemesis ,  UA neg. CT A/P showed Extrahepatic common duct is enlarged near the gallbladder neck, with internal 2.3 cm masslike density which could reflect a large stone. Common bile duct stent traverses this region. Mild intrahepatic biliary distention with bile duct enhancement. There is mural thickening at the gallbladder neck as well. Infectious and/or neoplastic etiologies for these findings can be considered. Correlate for any signs and symptoms of cholangitis/sepsis. Sequelae of chronic   cholecystitis with fistula to the common duct can be considered as well, as described on prior MRI from 2/8/2021. Cirrhotic liver morphology. Moderate ascites. .Chronic right rectus sheath collection/hematoma, stable in size. High density material within the collection is of unclear significance, not seen on the prior noncontrasted CT from 1/3/2022 Correlate with hemodynamics and hematocrit of the patient. High density material could reflect a prominent tortuous vessel versus other blood products. In the setting of prior intervention, calcified and/or other embolic material can be considered.

## 2022-04-20 NOTE — CHART NOTE - NSCHARTNOTEFT_GEN_A_CORE
Spoke to patient's wife over the phone. She stated that pt did not have fevers at home , only abdominal discomfort and tightness with dark stools for 2 weeks. She wants him to be full code. Pt had paracentesis last time 5 years ago. Pt assessed at bedside with US to see for a good window for paracentesis but unable to find any good pocket. Will call IR in the morning for diagnostic paracentesis. Meanwhile will start patient on Ceftriaxone 2gm. Spoke to patient's wife over the phone. She stated that pt did not have fevers at home , only abdominal discomfort and tightness with dark stools for 2 weeks. She mentioned that pt went to see Dr. Mckeon in Jan 2022 after his discharge from [facility] and was told that he needs removal of stent and will place new one but was unable to follow up due to ongoing illness and hospitalization. She wants him to be full code. Pt had paracentesis last time 5 years ago. Pt assessed at bedside with US to see for a good window for paracentesis but unable to find any good pocket. Will call IR in the morning for diagnostic paracentesis. Meanwhile will start patient on Ceftriaxone 2gm.

## 2022-04-20 NOTE — CONSULT NOTE ADULT - ASSESSMENT
83yo M with cirrhosis with chronic rectus sheath mass enlarged now ?hematoma.   1. No acute surgical intervention warranted at this time  2. Rec IR evaluation and possible aspiration of mass/hematoma 83yo M with cirrhosis with chronic rectus sheath mass enlarged now ?hematoma.        No acute surgical intervention warranted at this time

## 2022-04-20 NOTE — H&P ADULT - NSHPPHYSICALEXAM_GEN_ALL_CORE
Vital Signs Last 24 Hrs  T(C): 36.3 (20 Apr 2022 09:51), Max: 36.7 (19 Apr 2022 18:27)  T(F): 97.4 (20 Apr 2022 09:51), Max: 98 (19 Apr 2022 18:27)  HR: 68 (20 Apr 2022 09:51) (62 - 70)  BP: 133/66 (20 Apr 2022 09:51) (119/70 - 133/70)  BP(mean): 83 (20 Apr 2022 09:51) (83 - 83)  RR: 17 (20 Apr 2022 09:51) (17 - 18)  SpO2: 100% (20 Apr 2022 09:51) (98% - 100%)    GENERAL: NAD  EYES: EOMI, PERRLA,   NECK: Supple, No JVD  CHEST/LUNG: Clear to auscultation b/l  No rales, rhonchi, wheezing   HEART: Regular rate and rhythm; No murmurs, +ve S1 S2   ABDOMEN: Soft, Nontender, distended but not tender ; Enlarge liver, Bowel sounds present  NERVOUS SYSTEM:  Alert & Oriented X2, able to move upper and lower ext   EXTREMITIES:   No clubbing, cyanosis, or edema

## 2022-04-20 NOTE — H&P ADULT - PROBLEM SELECTOR PLAN 4
-CT A/P showed Chronic right rectus sheath collection/hematoma, stable in size. High density material within the collection is of unclear significance, not seen on the prior noncontrasted CT from 1/3/2022 Correlate with hemodynamics and hematocrit of the patient. High density material could reflect a prominent tortuous vessel versus other blood products. In the setting of prior intervention, calcified and/or other embolic material   -Surgery consulted   - No acute surgical intervention warranted at this time  -Rec IR evaluation and possible aspiration of mass/hematoma -CT A/P showed Chronic right rectus sheath collection/hematoma, stable in size. High density material within the collection is of unclear significance, not seen on the prior noncontrasted CT from 1/3/2022 Correlate with hemodynamics and hematocrit of the patient. High density material could reflect a prominent tortuous vessel versus other blood products. In the setting of prior intervention, calcified and/or other embolic material   -Surgery consulted   - No acute surgical intervention warranted at this time  -Rec IR evaluation and possible aspiration of mass/hematoma  -IR consult for hematoma

## 2022-04-20 NOTE — CHART NOTE - NSCHARTNOTESELECT_GEN_ALL_CORE
Event Note Occupational Therapy   Occupational Therapy Initial Assessment/Treatment Note  Date: 2019   Patient Name: Malika Nevarez  MRN: [id number]     : 1948    Date of Service: 2019    Discharge Recommendations:  [address]  OT Equipment Recommendations  Equipment Needed: No  Other: TBD    Assessment   Performance deficits / Impairments: Decreased functional mobility ; Decreased safe awareness;Decreased balance;Decreased cognition;Decreased ADL status; Decreased endurance;Decreased strength;Decreased high-level IADLs  Assessment: Pt is 69 yo male who presents with diagnosis of syncope and collapse. Pt recently discharged to SNF and readmitted for syncope. Attempted to check orthostatics, however pt unable to stand long enough to check BP in standing. Pt is modA x2 for all sit <> stand transfer attempts with poor sequencing/problem solving, safety awareness, and endurance limitng safety and indpendence. OT skilled services are indicated in the acute care setting to address above deficits and f/u with patient education. Recommend SNF level of care upon DC to continue to address deficits in mobility, transfers, and ADLs for safe DC home. Prognosis: Fair  Decision Making: Medium Complexity  Patient Education: role of OT, OT POC, safety, body mechanics, energy conservation  REQUIRES OT FOLLOW UP: Yes  Activity Tolerance  Activity Tolerance: Patient Tolerated treatment well;Patient limited by fatigue  Activity Tolerance: Orthostatics: Supine- 160/96, sitting EOB-125/80, stanidng- unable to assess  Safety Devices  Safety Devices in place: Yes  Type of devices: Gait belt;Left in bed;Patient at risk for falls; Bed alarm in place;Nurse notified  Restraints  Initially in place: No           Patient Diagnosis(es): The primary encounter diagnosis was Syncope and collapse.  Diagnoses of Hypervolemia, unspecified hypervolemia type, Hypoalbuminemia, Orthostatic hypotension, Generalized weakness, Pt refusing turn by RN. Special air pressure mattress in place. Pt educated on importance of turning and purpose. Pt understands but is still refusing turn. Secure message sent to Dr. Elvira Warner; Silvio Eugene, ortho's charted in flow sheets but patient is refusing to stand. Also, could we possibly insert a jones since patient is now on IV Lasix? He is incontinent and does not turn well due to pain. Thank you! Upon entrance pt requesting that writer leave him alone and leave the room for another hour.  Will hold medication and retry time  Social/Functional History  Social/Functional History  Lives With: (pt states lives wiht mother however prior eval/chart review states patient lives with sister and dtr-- will confirm)  Type of Home: House  Home Layout: Two level  Home Access: Stairs to enter with rails  Entrance Stairs - Number of Steps: 2-3  Bathroom Shower/Tub: Walk-in shower  Bathroom Equipment: Grab bars in shower, Shower chair, Grab bars around toilet  Home Equipment: Rolling walker  Receives Help From: Family  ADL Assistance: Needs assistance(family supervises showering)  Homemaking Responsibilities: No  Ambulation Assistance: (occasional SBA and RW)  Transfer Assistance: Independent  Active : No  Occupation: Retired  Leisure & Hobbies: hang outside by pool area (does not swim)  Additional Comments: pt limited historian therefore most of social history taken from prior PT evaluation 6/23/19    Objective     RLE AROM: WFL  LLE AROM : WFL  Strength RLE  Comment: Grossly 3+/5   Strength LLE  Comment: Grossly 3+/5        Bed mobility  Supine to Sit: Dependent/Total(mod A x 2 with HOB elevated, use of rails, and cues for technique)  Sit to Supine: Stand by assistance(to right sidelying)     Transfers  Sit to Stand: Dependent/Total(mod A x 2 to RW.  Multiple attempts required to achieve full standing and increased time. )  Stand to sit: Dependent/Total     Ambulation  Ambulation?: No(unable this date due to poor standing tolerance)     Balance  Sitting - Static: Good  Sitting - Dynamic: Fair;+  Standing - Static: Fair  Comments: min A to maintain static standing balance with RW ~1 minute        Plan   Plan  Times per week: 3-5x/wk  Times per day: Daily  Specific instructions for Next Treatment: progress mobility as tolerated  Current Treatment Recommendations: Strengthening, Gait Training, Patient/Caregiver Education & Training, ROM, Balance Training, Neuromuscular Re-education, Functional Mobility Training, Endurance Training, Safety suspect due to orthostasis  - he was volume overloaded on admission, not dehydrated. - no apparent infections. - no events on tele. Recent TTE last month was fairly unremarkable. - this is probably due to his general debility, weakness, deconditioning, and poor oncotic pressures from malnutrition. Conservative management with compression stockings, standing up slowly, and syncope precautions in high risk situations like when he is on the toilet. - we will need to tolerate some higher blood pressures when he is recumbent so that he isn't hypotensive when he stands.     Acute on chronic diastolic CHF, HTN  - furosemide IV on admission, continue spironolactone  - reduced losartan, changed metoprolol to succinate form, stopped amlodipine  - elevating and compressing his legs will be key.     CKD3  - his baseline Cr is uncertain but probably about 1.8.  s  - he has h/o obstructive uropathy and had a TURP years ago, so watch for urinary retention. Darral Medal was no hydronephrosis on CT from last admission.  - avoid nephrotoxins.      Afib   - he is not on anticoagulation due to his h/o hemorrhagic shock from UGIB.  It also looks like he had a significant hematoma and complicated hospital stay in late 2017 when a L femoral artery aneurysm spontaneously ruptured. Corrie Falcon did have a watchman procedure performed in 1/2018.  - metoprolol as above     H/o CVA  -  aspirin, statin.     DM2  - A1c was only 5.1 two weeks prior to admission.  No home meds in our system, but he does have metformin listed in the [facility] system.  I would recommend stopping metformin (if he actually takes this) due to his CKD and low A1c.  - no need for SSI while here.     Vascular dementia, with impulsive tendencies and h/o behavioral disturbances while hospitalized  - continue valproic acid.  He is also on carnitine supplement because he has a h/o valproate-induced hyperammonemia.   - supportive care.        DVT Prophylaxis: SC heparin  Diet: DIET

## 2022-04-20 NOTE — H&P ADULT - ASSESSMENT
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 Admitted for GI bleed and suspected SBP

## 2022-04-20 NOTE — H&P ADULT - PROBLEM SELECTOR PLAN 6
-Extrahepatic common duct is enlarged near the gallbladder neck, with internal 2.3 cm masslike density which could reflect a large stone. Common bile duct stent traverses this region. Mild intrahepatic biliary distention with bile duct enhancement.   -had plastic stent placed in 7/2021, needs replacement done by Dr. Neftaly Mckeon  -  -AST/ALT 50/23. Bili wnl  -Pt is afebrile and no tnderness in RUQ  -Was recommended to follow with Dr. Mckeon for further EHL and stone removal on his previous admissions.

## 2022-04-20 NOTE — CHART NOTE - NSCHARTNOTEFT_GEN_A_CORE
Called by Dr Medeiros regarding this patient    CT A/P obtained showing "Chronic right rectus sheath collection/hematoma, stable in size. High density material within the collection is of unclear significance, not seen on the prior noncontrasted CT from 1/3/2022 Correlate with hemodynamics and hematocrit of the patient. High density material could reflect a prominent tortuous vessel versus other blood products. In the setting of prior intervention, calcified and/or other embolic material can be considered."    Prior imaging reviewed back to 2019. Lesion has been there since then on multiple prior CT AP without significant change in size and varying degrees of high density material. Patient is hemodynamically stable and hemoglobin is at baseline.    While CT AP of the abdomen and pelvis with IV contrast in venous phase is not optimized to detect acute bleeding (no non con to determine importance of high density material), given patient's clinical course, unlikely to have a new bleed at this time. Lesion is chronic and does not warrant intervention at this time    If patient has hemoglobin drop or becomes hemodynamically unstable, can obtain triple phase CTA of the A/P to look for acute bleeding at that time.    Above information was discussed with Dr Medeiros

## 2022-04-20 NOTE — H&P ADULT - ATTENDING COMMENTS
Pt seen at bedside  Case discussed with MAR.    82 year old man with PMH of decompensated alcoholic liver disease (cirrhosis), hx of GI bleed- variceal s/p banding in 2018, bleeding duodenal ulcer s/p APC (2020), Mirizzi's syndrome s/p ERCP who presents with diffuse abdominal pain, fevers, dark colored stools and dizziness for close to a week.      Vital Signs Last 24 Hrs  T(C): 36.7 (19 Apr 2022 18:27), Max: 36.7 (19 Apr 2022 18:27)  T(F): 98 (19 Apr 2022 18:27), Max: 98 (19 Apr 2022 18:27)  HR: 62 (19 Apr 2022 18:27) (62 - 62)  BP: 119/70 (19 Apr 2022 18:27) (119/70 - 119/70)  RR: 17 (19 Apr 2022 18:27) (17 - 17)  SpO2: 98% (19 Apr 2022 18:27) (98% - 98%)    04-19    141  |  109<H>  |  15  ----------------------------<  98  4.3   |  27  |  0.94    Ca    9.0      19 Apr 2022 21:26    TPro  6.3  /  Alb  2.0<L>  /  TBili  0.7  /  DBili  x   /  AST  50<H>  /  ALT  23  /  AlkPhos  484<H>  04-19                          11.2   6.16  )-----------( 227      ( 19 Apr 2022 21:26 )             35.4     CT abdomen  - Liver: Cirrhotic liver. Biliary stent straddling the hepatic biliary confluence and duodenum.  - Gallbladder and bile ducts: Intrahepatic bile duct dilatation. Biliary soft tissue mass is noted within the upper biliary tree, extending to the cystic duct as well.   Subcentimeter calcified gallstone is seen layering dependently within the gallbladder lumen.  Gastric wall thickening at the level of fundus, suggesting gastritis. Gastric wall thickness measures 2.14 cm.  Intraperitoneal space: Interval development of a large amount of ascites throughout the abdomen and pelvis.  Arteries: Extensive arterial wall calcifications and soft plaque without aneurysm.    Bones/joints: Metallic right hip replacement prosthesis.  Soft tissues: Fat containing umbilical hernia without inflammation. Fluid collection, versus organizing hematoma in the upper anterior abdominal wall on the right of midline. This measures 4 x 1.86 x 7.23 cm.      Impression   82 year old man with hx as above presenting with fevers, abdominal pain and dark stools concerning for spontaneous bacterial peritonitis and possible upper GI bleed. The abdominal wall collection which might be an hematoma is still present and appears to be slightly larger.    A/P  - Suspected SBP  - Suspected upper GI bleed  - Abdominal wall ?hematoma Pt seen at bedside  Case discussed with MAR.    82 year old man with PMH of decompensated alcoholic liver disease (cirrhosis), hx of GI bleed- variceal s/p banding in 2018, bleeding duodenal ulcer s/p APC (2020), Mirizzi's syndrome s/p ERCP who presents with diffuse abdominal pain, fevers, dark colored stools and dizziness for close to a week as per ED sign out. The patient however complains mainly of worsening physical debility and ambulatory dysfunction despite rehab and PT. He denies fever and abdominal pain. He states he uses lactulose and has dark hard stool but denies any melanotic stool or bloody stool. He also complains of an ulcer on the heel of his left foot.   Called daughter @ [phone] and wife @ [phone] but were both unavailable to provide collateral information.      Vital Signs Last 24 Hrs  T(C): 36.7 (19 Apr 2022 18:27), Max: 36.7 (19 Apr 2022 18:27)  T(F): 98 (19 Apr 2022 18:27), Max: 98 (19 Apr 2022 18:27)  HR: 62 (19 Apr 2022 18:27) (62 - 62)  BP: 119/70 (19 Apr 2022 18:27) (119/70 - 119/70)  RR: 17 (19 Apr 2022 18:27) (17 - 17)  SpO2: 98% (19 Apr 2022 18:27) (98% - 98%)    Elderly man, awake, alert, oriented to person, place and time  NAD  Not pale, afebrile   Full, soft abdomen, mild to moderate tenderness in the RUQ and RLQ. no rebound or rigidity. Mild redness periumbilical  CTA B/L   no pedal edema   left heel with nonhealing ulcer, no redness warmth or tenderness    04-19    141  |  109<H>  |  15  ----------------------------<  98  4.3   |  27  |  0.94    Ca    9.0      19 Apr 2022 21:26    TPro  6.3  /  Alb  2.0<L>  /  TBili  0.7  /  DBili  x   /  AST  50<H>  /  ALT  23  /  AlkPhos  484<H>  04-19                          11.2   6.16  )-----------( 227      ( 19 Apr 2022 21:26 )             35.4     CT abdomen  - Liver: Cirrhotic liver. Biliary stent straddling the hepatic biliary confluence and duodenum.  - Gallbladder and bile ducts: Intrahepatic bile duct dilatation. Biliary soft tissue mass is noted within the upper biliary tree, extending to the cystic duct as well.   Subcentimeter calcified gallstone is seen layering dependently within the gallbladder lumen.  Gastric wall thickening at the level of fundus, suggesting gastritis. Gastric wall thickness measures 2.14 cm.  Intraperitoneal space: Interval development of a large amount of ascites throughout the abdomen and pelvis.  Arteries: Extensive arterial wall calcifications and soft plaque without aneurysm.    Bones/joints: Metallic right hip replacement prosthesis.  Soft tissues: Fat containing umbilical hernia without inflammation. Fluid collection, versus organizing hematoma in the upper anterior abdominal wall on the right of midline. This measures 4 x 1.86 x 7.23 cm.      Impression   82 year old man with hx as above presenting with fevers, abdominal pain and dark stools but appears here with no fevers since admission and no significant findings on abdominal exam with mild to moderate focal tenderness. The concern for GI bleeding appears less than initially expressed.  However, based on prior hx, would evaluate and start treatment for spontaneous bacterial peritonitis and serially check hgb level for upper GI bleed. The abdominal wall collection which might be an hematoma is still present and appears to be slightly larger. General surgery consulted    A/P  - Suspected SBP  - Suspected upper GI bleed  - Abdominal wall ?hematoma  - Physical debility with ambulatory dysfunction   - left heel ulcer likely decubitus.    Plan   Admit to Medicine   Serial H/H, check FOBT  Empiric IV antibiotic with ceftriaxone 1g daily  Paracentesis - bedside vs. IR  F/up gen surgery consult   PT consult   Wound care consult for left heel ulcer

## 2022-04-20 NOTE — H&P ADULT - PROBLEM SELECTOR PLAN 1
-Pt came with abdominal swelling and pain , no tenderness on exam   -Pt denied fever at home and can't recall prior paracentesis   -Afebrile in ED , no leukocytosis   -CT A/P showed moderate ascites   -s/p Ceftriaxone 2gm in ED  -Pt need diagnostic paracentesis  -Unable to calculate MELD score and Child Earl score , INR not available   -F/U PT/ INR and repeat CBC   - -Pt came with abdominal swelling and pain , no tenderness on exam   -Pt denied fever at home and can't recall prior paracentesis   -Afebrile in ED , no leukocytosis   -CT A/P showed moderate ascites   -s/p Ceftriaxone 2gm in ED  -Pt need diagnostic paracentesis  -Unable to calculate MELD score and Child Earl score , INR not available   -F/U PT/ INR and repeat CBC   -On lactulose , Lasix and propranolol   -Will add aldactone as well  -GI consult Dr. Rinaldi  -ID consult Dr. Peters

## 2022-04-20 NOTE — H&P ADULT - PROBLEM SELECTOR PLAN 5
-CT A/P showed mural thickening at the gallbladder neck as well. Infectious and/or neoplastic etiologies for these findings can be considered. Correlate for any signs and symptoms of cholangitis/sepsis. Sequelae of chronic   cholecystitis with fistula to the common duct can be considered as well, as described on prior MRI from 2/8/2021  -GI consult   -Hepatology consult  -Need to readdress GOC -CT A/P showed mural thickening at the gallbladder neck as well. Infectious and/or neoplastic etiologies for these findings can be considered. Correlate for any signs and symptoms of cholangitis/sepsis. Sequelae of chronic   cholecystitis with fistula to the common duct can be considered as well, as described on prior MRI from 2/8/2021  -GI consult   -Need to readdress GOC

## 2022-04-20 NOTE — CONSULT NOTE ADULT - SUBJECTIVE AND OBJECTIVE BOX
General Surgery Consultation Note    Patient is a 82y old  Male who presents with a chief complaint of abdominal pain    HPI:  83yo M with PMH HTN, CHF, cirrhosis presents c/o abdominal pain x 2 months. Pt states pain is primarily on right side of abdomen. Pt had CT scan showing abdominal wall hematoma [id number] corresponding to rectus sheath mass seen since .  Surgery consulted for evaluation of hematoma.   Pt states used blood thinners in the past but none recently. Denies trauma or fall. Pt has been primarily bedbound for the past 2 months.   Pt denies fever or chills, nausea or vomiting.     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)    Pancreatitis  2/2 choledocholithiasis s/p ERCP with stone extraction and plastic stent placement (2019, stent now removed)    Hematochezia  2/2 colonic AVMs s/p cauterization and hemorrhoids (2019)    Melena  2/2 duodenal ulcers s/p argon plasma coagulation (2020)    Chronic kidney disease (CKD)    H/O inguinal hernia repair    History of repair of hip fracture  R hip    History of hip replacement  10/2020        Allergies    No Known Allergies    Intolerances        MEDICATIONS  (STANDING):    MEDICATIONS  (PRN):      Vital Signs Last 24 Hrs  T(C): 36.7 (2022 18:27), Max: 36.7 (2022 18:27)  T(F): 98 (2022 18:27), Max: 98 (2022 18:27)  HR: 62 (2022 18:27) (62 - 62)  BP: 119/70 (2022 18:27) (119/70 - 119/70)  BP(mean): --  RR: 17 (2022 18:27) (17 - 17)  SpO2: 98% (2022 18:27) (98% - 98%)    Physical Exam:  Gen: awake, alert oriented NAD  HEENT: anicteric  Abd: obese, soft, + supraumbilical mass palpated.  + tenderness right abdomen. no other masses palpated  Ext: b/l feet cool to touch with purple discoloration of multiple toes.     Labs:                          11.2   6.16  )-----------( 227      ( 2022 21:26 )             35.4     -    141  |  109<H>  |  15  ----------------------------<  98  4.3   |  27  |  0.94    Ca    9.0      2022 21:26    TPro  6.3  /  Alb  2.0<L>  /  TBili  0.7  /  DBili  x   /  AST  50<H>  /  ALT  23  /  AlkPhos  484<H>        Urinalysis Basic - ( 2022 02:49 )    Color: Yellow / Appearance: Clear / S.015 / pH: x  Gluc: x / Ketone: Negative  / Bili: Negative / Urobili: Negative   Blood: x / Protein: Negative / Nitrite: Positive   Leuk Esterase: Trace / RBC: 0-2 /HPF / WBC 3-5 /HPF   Sq Epi: x / Non Sq Epi: Few /HPF / Bacteria: Moderate /HPF          Radiological Exams:  c< from: CT Abdomen and Pelvis w/ IV Cont (22 @ 00:51) >  IMPRESSION:  1. Interval development of large amount of ascites throughout the abdomen  pelvis.  2. Underlying cirrhotic liver.  3. Biliary stent as noted.  4. Soft tissue mass within the extrahepatic biliary tree, with diameter   of 2.6  cm.  5. Persistent mild intrahepatic bile duct dilatation.  6. Total right hip replacement prosthesis with metallic components.  7. Mild gastritis.  8. Right abdominal wall organizing hematoma measuring 4 x 1.86 x 7.23 cm.      < end of copied text >

## 2022-04-20 NOTE — H&P ADULT - PROBLEM SELECTOR PLAN 7
-On lactulose, Lasix and propranolol   -Unable to calculate Child Earl score and MELD score due to INR  -f/u PT/INR  -Resume home meds

## 2022-04-20 NOTE — H&P ADULT - PARTICIPANTS
Pt deferred the decision to his wife. Multiple attempts to reach out to wife but unsuccessful. Per last admissions Pt's wife decided for full code./Patient

## 2022-04-20 NOTE — H&P ADULT - PROBLEM SELECTOR PLAN 3
-Hgb 11 on admission  -At baseline   -f/u anemia panel   -CBC q12hr   -No s/s of active bleeding currently

## 2022-04-20 NOTE — H&P ADULT - NSICDXPASTMEDICALHX_GEN_ALL_CORE_FT
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

## 2022-04-20 NOTE — CONSULT NOTE ADULT - ASSESSMENT
Acute Cholangitis / infected Biliary stent - most likely diagnosis  Spontaneous Bacterial Peritonitis - seems unlikely      Plan - DC Rocephin  Start Zosyn 3.375 gms iv q8hrs to cover both conditions  await paracentesis and ERCP

## 2022-04-20 NOTE — H&P ADULT - PROBLEM SELECTOR PLAN 2
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  -CBC q12hr   -f/u repeat CBC  -f/u FOBT  -PPI IV BID  -GI consult Dr. Rinaldi

## 2022-04-20 NOTE — CHART NOTE - NSCHARTNOTEFT_GEN_A_CORE
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 Per wife, patient follows with Dr. Cho and was scheduled to get stent exchanged but had multiple hospitalizations and wasn't  done.      Labs reviewed.     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 CT abdomen showed, Extrahepatic common duct is enlarged near the gallbladder neck, with internal 2.3 cm masslike density which could reflect a large stone, CBD traverses this region. Will get MRCP with contrast. Appreciate ID recommendations, start IV Zosyn. Follow blood cx. GI Dr. Rinaldi consulted   -CT showed Chronic right rectus sheath collection/hematoma, stable in size. High density material within the collection is of unclear significance, not seen on the prior noncontrasted CT from 1/3/2022. Discussed with IR attending at length, given patient's hemodynamic stability, stable H/H  it's unlikely that patient has an acute bleeding at this time. Will hold off any intervention, monitor cbc. If concerns of bleeding develops will get CT with contrast   -CT showed moderate ascites, but no visualized pocket on bedside US. Discussed with ID on case, doubt patient has SBP.   -C/w lactulose, titrate to 2-3 soft BM /day   -C/w Lasix and aldactone   -C/w propanolol

## 2022-04-20 NOTE — CONSULT NOTE ADULT - NS ATTEND AMEND GEN_ALL_CORE FT
- History of Mirizzi's s/p biliary stenting.  - Possible cholangitis.  - Abdominal pain.  - Cirrhosis history of varices and ascites.     Patient seen and examined. Limited historian as AAOx2 however reports presented due to epigastric pain, abd distension. Also notes dark brown stools at home (however Hb has been stable at 11 and no overt bleeding here). CT reviewed and biliary stent in place with biliary dilation and known large cystic duct stone along with moderate ascites. Discussed with ID, c/f cholangitis given signs of biliary obstruction on CT and old retained stent (placed 6/2021, lost to f/u). Prior ERCPs reviewed, known Mirizzi's and considered poor surgical candidate for cholecystectomy so underwent EHL and biliary stent placement in 6/2021. Recommend diagnostic paracentesis r/o SBP given pain and ascites. If negative, tentative plans for ERCP with biliary stent removal and cystic duct stone Friday.
No acute surgical issue - old right rectus sheath hematoma, present on prior CT, does not require intervention.

## 2022-04-20 NOTE — CONSULT NOTE ADULT - SUBJECTIVE AND OBJECTIVE BOX
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 Pt is AxO 2 on assessment , not a detailed historian. Pt stated that he has been noticing that for past few days his abdomen is getting bigger and tighter associate with generalized abdominal pain which is mild in intensity. Pt further stated that his wife noticing black-brown stool for past one month. Pt denied fever, nausea , vomiting, diarrhea, hematemesis, headaches, urinary difficulty. Pt doesn't remember whether he has paracentesis or not in the past. Called patient wife multiple time and daughter for collateral hx but no answer.     Of note: [facility] and UTI/COVID respectively. For GI bleed pt H/H was stable in the hospital and no bleeding noted in the hospital and no GI intervention was performed. Per prior records last colonoscopy 2020 -showing rectal varices, vascular ectasia in ascending in descending colon s/p ablation. Endoscopy  > The esophagus contained trace varices.                          ED Course: Hgb 11 (at his baseline) , no episodes of melena or hematemesis ,  UA neg. CT A/P showed Extrahepatic common duct is enlarged near the gallbladder neck, with internal 2.3 cm masslike density which could reflect a large stone. Common bile duct stent traverses this region. Mild intrahepatic biliary distention with bile duct enhancement. There is mural thickening at the gallbladder neck as well. Infectious and/or neoplastic etiologies for these findings can be considered. Correlate for any signs and symptoms of cholangitis/sepsis. Sequelae of chronic   cholecystitis with fistula to the common duct can be considered as well, as described on prior MRI from 2021. Cirrhotic liver morphology. Moderate ascites. .Chronic right rectus sheath collection/hematoma, stable in size. High density material within the collection is of unclear significance, not seen on the prior noncontrasted CT from 1/3/2022 Correlate with hemodynamics and hematocrit of the patient. High density material could reflect a prominent tortuous vessel versus other blood products. In the setting of prior intervention, calcified and/or other embolic material can be considered.      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)    Pancreatitis  2/2 choledocholithiasis s/p ERCP with stone extraction and plastic stent placement (2019, stent now removed)    Hematochezia  2/2 colonic AVMs s/p cauterization and hemorrhoids (2019)    Melena  2/2 duodenal ulcers s/p argon plasma coagulation (2020)    Chronic kidney disease (CKD)    H/O inguinal hernia repair    History of repair of hip fracture  R hip    History of hip replacement  10/2020        No Known Allergies      Meds:  cefTRIAXone   IVPB 2000 milliGRAM(s) IV Intermittent every 24 hours  furosemide    Tablet 20 milliGRAM(s) Oral daily  lactulose Syrup 10 Gram(s) Oral two times a day  pantoprazole  Injectable 40 milliGRAM(s) IV Push every 12 hours  propranolol 10 milliGRAM(s) Oral daily  spironolactone 50 milliGRAM(s) Oral daily      SOCIAL HISTORY:  Smoker:  YES / NO        PACK YEARS:                         WHEN QUIT?  ETOH use:  YES / NO               FREQUENCY / QUANTITY:  [employer]lic[employer] Drug use:  YES / NO  Occupation:  Assisted device use (Cane / Walker):  Live with:    FAMILY HISTORY:  Family history of ovarian cancer (Sibling)        VITALS:  Vital Signs Last 24 Hrs  T(C): 36.5 (2022 13:50), Max: 36.7 (2022 18:27)  T(F): 97.7 (2022 13:50), Max: 98 (2022 18:27)  HR: 69 (2022 13:50) (62 - 70)  BP: 115/64 (2022 13:50) (115/64 - 133/70)  BP(mean): 77 (2022 13:50) (77 - 83)  RR: 17 (2022 13:50) (17 - 18)  SpO2: 99% (2022 13:50) (98% - 100%)    LABS/DIAGNOSTIC TESTS:                          11.4   4.58  )-----------( 220      ( 2022 12:45 )             36.8     WBC Count: 4.58 K/uL ( @ 12:45)  WBC Count: 6.16 K/uL ( @ 21:26)          141  |  109<H>  |  15  ----------------------------<  98  4.3   |  27  |  0.94    Ca    9.0      2022 21:26    TPro  6.3  /  Alb  2.0<L>  /  TBili  0.7  /  DBili  x   /  AST  50<H>  /  ALT  23  /  AlkPhos  484<H>        Urinalysis Basic - ( 2022 02:49 )    Color: Yellow / Appearance: Clear / S.015 / pH: x  Gluc: x / Ketone: Negative  / Bili: Negative / Urobili: Negative   Blood: x / Protein: Negative / Nitrite: Positive   Leuk Esterase: Trace / RBC: 0-2 /HPF / WBC 3-5 /HPF   Sq Epi: x / Non Sq Epi: Few /HPF / Bacteria: Moderate /HPF        LIVER FUNCTIONS - ( 2022 21:26 )  Alb: 2.0 g/dL / Pro: 6.3 g/dL / ALK PHOS: 484 U/L / ALT: 23 U/L DA / AST: 50 U/L / GGT: x             PT/INR - ( 2022 12:45 )   PT: 17.9 sec;   INR: 1.50 ratio         PTT - ( 2022 12:45 )  PTT:35.5 sec    LACTATE:Lactate, Blood: 1.1 mmol/L ( @ 21:26)      ABG -           RADIOLOGY:< from: CT Abdomen and Pelvis w/ IV Cont (22 @ 00:51) >  ACC: [id number] EXAM:  CT ABDOMEN AND PELVIS IC                          PROCEDURE DATE:  2022          INTERPRETATION:  CLINICAL INFORMATION: Abdominal pain.    COMPARISON: CT abdomen pelvis 1/3/2022. MRI abdomen 2021.    CONTRAST/COMPLICATIONS:  IV Contrast: Omnipaque 350  90 cc administered   10 cc discarded  Oral Contrast: NONE  Complications: None reported at time of study completion    PROCEDURE:  CT of the Abdomen and Pelvis was performed.  Sagittal and coronal reformats were performed.    FINDINGS:    LOWER CHEST: Aortic valve and multivessel coronary artery calcification.   Trace left pleural effusion. Dependent atelectasis/scarring of the lung   parenchyma. Few subcentimeter clustered nodules of the left lower lobe   may represent distal airways mucus impaction versus small airways   infection.    LIVER: Cirrhotic liver morphology. Few areas of nonspecific peripheral   low attenuation along segments 4b and segment 3 of unclear significance.   Main portal vein and hepatic veins are patent.    BILE DUCTS / GALLBLADDER: Extrahepatic common duct is enlarged near the   gallbladder neck, with internal 2.3 cm masslike density which could   reflect a large stone, images  of series 6. Common bile duct stent   traverses this region. Mild intrahepatic biliary distention with bile   duct enhancement. There is mural thickening at the gallbladder neck as   well. Infectious and/or neoplastic etiologies for these findings can be   considered. Correlate for any signs and symptoms of cholangitis/sepsis.   Sequelae of chronic cholecystitis with fistula to the common duct can be   considered as well, as described on prior MRI from 2021. Correlation   with contrast-enhanced MRI/MRCP is recommended for characterization.    SPLEEN: Spleen size within normal limits  PANCREAS: No main ductal dilatation. Stable subcentimeter cystic   densities up to 5 mm of the pancreatic tail, image 59 series 2, likely   sidebranch IPMN or sequelae of prior pancreatitis.  ADRENALS: Unremarkable  KIDNEYS/URETERS: No hydronephrosis. Lobulated contour of the kidneys.   Subcentimeter hypodensities of the kidneys are too small to characterize.    BLADDER: Mildly distended  REPRODUCTIVE ORGANS: Prostate size within normal limits, though  suboptimally assessed due to streak artifact from right hip prosthesis.    BOWEL: Limited evaluation the absence of oral contrast. Stomach is   underdistended. No small bowel distention. The appendix is noninflamed.   Mild stool burden of the colonlimits evaluation of the colonic mucosa.  PERITONEUM: Moderate ascites.  VESSELS: No aneurysm of the abdominal aorta. Moderate to severe   aortoiliac atheromatous changes including small peripheral mural   thrombus/plaque ulceration involving the infrarenal segment. Visceral   artery atheromatous changes also identified.  RETROPERITONEUM/LYMPH NODES: Small volume nodes, particularly in the   right upper quadrant.  ABDOMINAL WALL: Chronic right rectus sheath collection stable in size   since MRI abdomen dated 2021, now measuring 3.8 x 1.8 x 7.0 cm,   likely a chronic right rectus sheath hematoma. High density material   within the collection is of unclear significance, not seen on the prior   noncontrasted CT. Correlate with hemodynamics and hematocrit of the   patient. High density material could reflect a prominent tortuous vessel   versus other blood products. In the setting of prior intervention,   calcified and/or other embolic material can be considered. Soft tissue   edema alongthe abdominal wall and flanks. Mild soft tissue thickening   adjacent to the sacrum/coccyx, image 136 series 2. Correlate for   potential sacral cubitus ulceration. Gynecomastia. Small fat-containing   umbilical hernia  BONES: Degenerative changes. Right hip prosthesis partially imaged.    IMPRESSION:    Extrahepatic common duct is enlarged near the gallbladder neck, with   internal 2.3 cm masslike density which could reflect a large stone,   images  of series 6. Common bile duct stent traverses this region.   Mild intrahepatic biliary distention with bile duct enhancement. There is   mural thickening at the gallbladder neck as well. Infectious and/or   neoplastic etiologies for these findings can be considered. Correlate for   any signs and symptoms of cholangitis/sepsis. Sequelae of chronic   cholecystitis with fistula to the common duct can be considered as well,   as described on prior MRI from 2021. Correlation with   contrast-enhanced MRI/MRCP is recommended for characterization.    Cirrhotic liver morphology. Moderate ascites. Few areas of nonspecific   peripheral low attenuation along segments 4b and segment 3 of unclear   significance.    Chronic right rectus sheath collection/hematoma, stable in size. High   density material within the collection is of unclear significance, not   seen on the prior noncontrasted CT from 1/3/2022 Correlate with   hemodynamics and hematocrit of the patient. High density material could   reflect a prominent tortuous vessel versus other blood products. In the   setting of prior intervention, calcified and/or other embolic material   can be considered.    Trace left pleural effusion. Few clustered left lower lobe nodules may   represent distal airways mucus impaction versus smallairways infection.    Mild soft tissue thickening adjacent to the sacrum/coccyx, image 136   series 2. Correlate for potential sacral cubitus ulceration.    --- End of Report ---            DEYVI MULLIGAN M.D., ATTENDING RADIOLOGIST  This document has been electronically signed. 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 Pt is AxO 2 on assessment , not a detailed historian. Pt stated that he has been noticing that for past few days his abdomen is getting bigger and tighter associate with generalized abdominal pain which is mild in intensity. Pt further stated that his wife noticing black-brown stool for past one month. Pt denied fever, nausea , vomiting, diarrhea, hematemesis, headaches, urinary difficulty. Pt doesn't remember whether he has paracentesis or not in the past. Called patient wife multiple time and daughter for collateral hx but no answer.   Of note: pt admitted at [facility] in  & Feb for GI bleed and UTI/COVID respectively. For GI bleed pt H/H was stable in the hospital and no bleeding noted in the hospital and no GI intervention was performed. Per prior records last colonoscopy 2020 -showing rectal varices, vascular ectasia in ascending in descending colon s/p ablation. Endoscopy  > The esophagus contained trace varices.  ED Course: Hgb 11 (at his baseline) , no episodes of melena or hematemesis ,  UA neg. CT A/P showed Extrahepatic common duct is enlarged near the gallbladder neck, with internal 2.3 cm masslike density which could reflect a large stone. Common bile duct stent traverses this region. Mild intrahepatic biliary distention with bile duct enhancement. There is mural thickening at the gallbladder neck as well. Infectious and/or neoplastic etiologies for these findings can be considered. Correlate for any signs and symptoms of cholangitis/sepsis. Sequelae of chronic   cholecystitis with fistula to the common duct can be considered as well, as described on prior MRI from 2021. Cirrhotic liver morphology. Moderate ascites. .Chronic right rectus sheath collection/hematoma, stable in size. High density material within the collection is of unclear significance, not seen on the prior noncontrasted CT from 1/3/2022 Correlate with hemodynamics and hematocrit of the patient. High density material could reflect a prominent tortuous vessel versus other blood products. In the setting of prior intervention, calcified and/or other embolic material can be considered.            History as above, who has a h/o having a large gallstone and is s/p Stent placement 1 year ago and never followed up to have it removed or replaced, he also has a h/o alcohol induced cirrhosis and has ascites and presented to the hospital abdominal pain and GI bleeding , I was asked to see him for possible SBP vs Cholangitis. He has no fevers or chills, he c/o abdominal pain but has no nausea , vomiting or diarrhea at this time. He was seen by GI and is going to go for an ERCP on Friday to have his stent exchanged and have his stone broken up. He is also going to get a paracentesis to r/o SBP tomorrow.             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)    Pancreatitis  2/2 choledocholithiasis s/p ERCP with stone extraction and plastic stent placement (2019, stent now removed)    Hematochezia  2/2 colonic AVMs s/p cauterization and hemorrhoids (2019)    Melena  2/2 duodenal ulcers s/p argon plasma coagulation (2020)    Chronic kidney disease (CKD)    H/O inguinal hernia repair    History of repair of hip fracture  R hip    History of hip replacement  10/2020        No Known Allergies      Meds:  cefTRIAXone   IVPB 2000 milliGRAM(s) IV Intermittent every 24 hours  furosemide    Tablet 20 milliGRAM(s) Oral daily  lactulose Syrup 10 Gram(s) Oral two times a day  pantoprazole  Injectable 40 milliGRAM(s) IV Push every 12 hours  propranolol 10 milliGRAM(s) Oral daily  spironolactone 50 milliGRAM(s) Oral daily      SOCIAL HISTORY:  Smoker: denies  ETOH use:  abused alcohol in the past, stopped several years ago      FAMILY HISTORY:  Family history of ovarian cancer (Sibling)        VITALS:  Vital Signs Last 24 Hrs  T(C): 36.5 (2022 13:50), Max: 36.7 (2022 18:27)  T(F): 97.7 (2022 13:50), Max: 98 (2022 18:27)  HR: 69 (2022 13:50) (62 - 70)  BP: 115/64 (2022 13:50) (115/64 - 133/70)  BP(mean): 77 (2022 13:50) (77 - 83)  RR: 17 (2022 13:50) (17 - 18)  SpO2: 99% (2022 13:50) (98% - 100%)    LABS/DIAGNOSTIC TESTS:                          11.4   4.58  )-----------( 220      ( 2022 12:45 )             36.8     WBC Count: 4.58 K/uL ( @ 12:45)  WBC Count: 6.16 K/uL ( @ 21:26)      04    141  |  109<H>  |  15  ----------------------------<  98  4.3   |  27  |  0.94    Ca    9.0      2022 21:26    TPro  6.3  /  Alb  2.0<L>  /  TBili  0.7  /  DBili  x   /  AST  50<H>  /  ALT  23  /  AlkPhos  484<H>        Urinalysis Basic - ( 2022 02:49 )    Color: Yellow / Appearance: Clear / S.015 / pH: x  Gluc: x / Ketone: Negative  / Bili: Negative / Urobili: Negative   Blood: x / Protein: Negative / Nitrite: Positive   Leuk Esterase: Trace / RBC: 0-2 /HPF / WBC 3-5 /HPF   Sq Epi: x / Non Sq Epi: Few /HPF / Bacteria: Moderate /HPF        LIVER FUNCTIONS - ( 2022 21:26 )  Alb: 2.0 g/dL / Pro: 6.3 g/dL / ALK PHOS: 484 U/L / ALT: 23 U/L DA / AST: 50 U/L / GGT: x             PT/INR - ( 2022 12:45 )   PT: 17.9 sec;   INR: 1.50 ratio         PTT - ( 2022 12:45 )  PTT:35.5 sec    LACTATE:Lactate, Blood: 1.1 mmol/L ( @ 21:26)      ABG -           RADIOLOGY:< from: CT Abdomen and Pelvis w/ IV Cont (22 @ 00:51) >  ACC: [id number] EXAM:  CT ABDOMEN AND PELVIS IC                          PROCEDURE DATE:  2022          INTERPRETATION:  CLINICAL INFORMATION: Abdominal pain.    COMPARISON: CT abdomen pelvis 1/3/2022. MRI abdomen 2021.    CONTRAST/COMPLICATIONS:  IV Contrast: Omnipaque 350  90 cc administered   10 cc discarded  Oral Contrast: NONE  Complications: None reported at time of study completion    PROCEDURE:  CT of the Abdomen and Pelvis was performed.  Sagittal and coronal reformats were performed.    FINDINGS:    LOWER CHEST: Aortic valve and multivessel coronary artery calcification.   Trace left pleural effusion. Dependent atelectasis/scarring of the lung   parenchyma. Few subcentimeter clustered nodules of the left lower lobe   may represent distal airways mucus impaction versus small airways   infection.    LIVER: Cirrhotic liver morphology. Few areas of nonspecific peripheral   low attenuation along segments 4b and segment 3 of unclear significance.   Main portal vein and hepatic veins are patent.    BILE DUCTS / GALLBLADDER: Extrahepatic common duct is enlarged near the   gallbladder neck, with internal 2.3 cm masslike density which could   reflect a large stone, images  of series 6. Common bile duct stent   traverses this region. Mild intrahepatic biliary distention with bile   duct enhancement. There is mural thickening at the gallbladder neck as   well. Infectious and/or neoplastic etiologies for these findings can be   considered. Correlate for any signs and symptoms of cholangitis/sepsis.   Sequelae of chronic cholecystitis with fistula to the common duct can be   considered as well, as described on prior MRI from 2021. Correlation   with contrast-enhanced MRI/MRCP is recommended for characterization.    SPLEEN: Spleen size within normal limits  PANCREAS: No main ductal dilatation. Stable subcentimeter cystic   densities up to 5 mm of the pancreatic tail, image 59 series 2, likely   sidebranch IPMN or sequelae of prior pancreatitis.  ADRENALS: Unremarkable  KIDNEYS/URETERS: No hydronephrosis. Lobulated contour of the kidneys.   Subcentimeter hypodensities of the kidneys are too small to characterize.    BLADDER: Mildly distended  REPRODUCTIVE ORGANS: Prostate size within normal limits, though  suboptimally assessed due to streak artifact from right hip prosthesis.    BOWEL: Limited evaluation the absence of oral contrast. Stomach is   underdistended. No small bowel distention. The appendix is noninflamed.   Mild stool burden of the colonlimits evaluation of the colonic mucosa.  PERITONEUM: Moderate ascites.  VESSELS: No aneurysm of the abdominal aorta. Moderate to severe   aortoiliac atheromatous changes including small peripheral mural   thrombus/plaque ulceration involving the infrarenal segment. Visceral   artery atheromatous changes also identified.  RETROPERITONEUM/LYMPH NODES: Small volume nodes, particularly in the   right upper quadrant.  ABDOMINAL WALL: Chronic right rectus sheath collection stable in size   since MRI abdomen dated 2021, now measuring 3.8 x 1.8 x 7.0 cm,   likely a chronic right rectus sheath hematoma. High density material   within the collection is of unclear significance, not seen on the prior   noncontrasted CT. Correlate with hemodynamics and hematocrit of the   patient. High density material could reflect a prominent tortuous vessel   versus other blood products. In the setting of prior intervention,   calcified and/or other embolic material can be considered. Soft tissue   edema alongthe abdominal wall and flanks. Mild soft tissue thickening   adjacent to the sacrum/coccyx, image 136 series 2. Correlate for   potential sacral cubitus ulceration. Gynecomastia. Small fat-containing   umbilical hernia  BONES: Degenerative changes. Right hip prosthesis partially imaged.    IMPRESSION:    Extrahepatic common duct is enlarged near the gallbladder neck, with   internal 2.3 cm masslike density which could reflect a large stone,   images  of series 6. Common bile duct stent traverses this region.   Mild intrahepatic biliary distention with bile duct enhancement. There is   mural thickening at the gallbladder neck as well. Infectious and/or   neoplastic etiologies for these findings can be considered. Correlate for   any signs and symptoms of cholangitis/sepsis. Sequelae of chronic   cholecystitis with fistula to the common duct can be considered as well,   as described on prior MRI from 2021. Correlation with   contrast-enhanced MRI/MRCP is recommended for characterization.    Cirrhotic liver morphology. Moderate ascites. Few areas of nonspecific   peripheral low attenuation along segments 4b and segment 3 of unclear   significance.    Chronic right rectus sheath collection/hematoma, stable in size. High   density material within the collection is of unclear significance, not   seen on the prior noncontrasted CT from 1/3/2022 Correlate with   hemodynamics and hematocrit of the patient. High density material could   reflect a prominent tortuous vessel versus other blood products. In the   setting of prior intervention, calcified and/or other embolic material   can be considered.    Trace left pleural effusion. Few clustered left lower lobe nodules may   represent distal airways mucus impaction versus smallairways infection.    Mild soft tissue thickening adjacent to the sacrum/coccyx, image 136   series 2. Correlate for potential sacral cubitus ulceration.    --- End of Report ---            DEYVI MULLIGAN M.D., ATTENDING RADIOLOGIST  This document has been electronically signed. 2022  9:34AM    < end of copied text >        ROS  [  ] UNABLE TO ELICIT

## 2022-04-20 NOTE — PATIENT PROFILE ADULT - FALL HARM RISK - HARM RISK INTERVENTIONS
Assistance OOB with selected safe patient handling equipment/Communicate Risk of Fall with Harm to all staff/Discuss with provider need for PT consult/Monitor gait and stability/Provide patient with walking aids - walker, cane, crutches/Reinforce activity limits and safety measures with patient and family/Tailored Fall Risk Interventions/Use of alarms - bed, chair and/or voice tab/Visual Cue: Yellow wristband and red socks/Bed in lowest position, wheels locked, appropriate side rails in place/Call bell, personal items and telephone in reach/Instruct patient to call for assistance before getting out of bed or chair/Non-slip footwear when patient is out of bed/[place] to call system/Physically safe environment - no spills, clutter or unnecessary equipment/Purposeful Proactive Rounding/Room/bathroom lighting operational, light cord in reach

## 2022-04-20 NOTE — CONSULT NOTE ADULT - SUBJECTIVE AND OBJECTIVE BOX
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 Pt is AxO 2 on assessment , not a detailed historian. Pt stated that he has been noticing that for past few days his abdomen is getting bigger and tighter associate with generalized abdominal pain which is mild in intensity. Pt further stated that his wife noticing black-brown stool for past one month. Pt denied fever, nausea , vomiting, diarrhea, hematemesis, headaches, urinary difficulty. Pt doesn't remember whether he has paracentesis or not in the past. Called patient wife multiple time and daughter for collateral hx but no answer.     Of note: pt admitted at [facility] in Jan & Feb for GI bleed and UTI/COVID respectively. Pt was evaluated by Dr Valdes during this admission 1/4-1/5/22. For GI bleed pt H/H was stable in the hospital and no bleeding noted in the hospital and no GI intervention was performed. Per prior records last colonoscopy 4/2020 -showing rectal varices, vascular ectasia in ascending in descending colon s/p ablation.     Last ERCP on 6/22/21 done by Dr Mckeon  Impression:  - Endoscopic retrograde cholangiopancreatography with stent exchange, cholangioscopy with EHL, and stone removal  Recommendation: - Repeat ERCP in 3-4 months for stent removal and reevaluation    ED Course: Hgb 11 (at his baseline) , no episodes of melena or hematemesis ,  UA neg. CT A/P showed Extrahepatic common duct is enlarged near the gallbladder neck, with internal 2.3 cm masslike density which could reflect a large stone. Common bile duct stent traverses this region. Mild intrahepatic biliary distention with bile duct enhancement. There is mural thickening at the gallbladder neck as well. Infectious and/or neoplastic etiologies for these findings can be considered. Correlate for any signs and symptoms of cholangitis/sepsis. Sequelae of chronic cholecystitis with fistula to the common duct can be considered as well, as described on prior MRI from 2/8/2021. Cirrhotic liver morphology. Moderate ascites. .Chronic right rectus sheath collection/hematoma, stable in size. High density material within the collection is of unclear significance, not seen on the prior noncontrasted CT from 1/3/2022 Correlate with hemodynamics and hematocrit of the patient. High density material could reflect a prominent tortuous vessel versus other blood products. In the setting of prior intervention, calcified and/or other embolic material can be considered.        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)    Pancreatitis  2/2 choledocholithiasis s/p ERCP with stone extraction and plastic stent placement (11/2019, stent now removed)    Hematochezia  2/2 colonic AVMs s/p cauterization and hemorrhoids (11/2019)    Melena  2/2 duodenal ulcers s/p argon plasma coagulation (4/2020)    Chronic kidney disease (CKD)    H/O inguinal hernia repair    History of repair of hip fracture  R hip    History of hip replacement  10/2020      FH:  FAMILY HISTORY:  Family history of ovarian cancer (Sibling)        MEDS:  MEDICATIONS  (STANDING):  furosemide    Tablet 20 milliGRAM(s) Oral daily  lactulose Syrup 10 Gram(s) Oral two times a day  pantoprazole  Injectable 40 milliGRAM(s) IV Push every 12 hours  propranolol 10 milliGRAM(s) Oral daily  spironolactone 50 milliGRAM(s) Oral daily    MEDICATIONS  (PRN):    Allergies    No Known Allergies    Intolerances            CONSTITUTIONAL:  No weight loss, fever, chills, weakness or fatigue.  HEENT:  Eyes:  No visual loss, blurred vision, double vision or yellow sclerae. Ears, Nose, Throat:  No hearing loss, sneezing, congestion, runny nose or sore throat.  SKIN:  No rash or itching.  CARDIOVASCULAR:  No chest pain, chest pressure or chest discomfort. No palpitations or edema.  RESPIRATORY:  No shortness of breath, cough or sputum.  GASTROINTESTINAL:  SEE HPI  GENITOURINARY:  No dysuria, hematuria, urinary frequency  NEUROLOGICAL:  No headache, dizziness, syncope, paralysis, ataxia, numbness or tingling in the extremities. No change in bowel or bladder control.  MUSCULOSKELETAL:  No muscle, back pain, joint pain or stiffness.  HEMATOLOGIC:  No anemia, bleeding or bruising.  LYMPHATICS:  No enlarged nodes. No history of splenectomy.  PSYCHIATRIC:  No history of depression or anxiety.  ENDOCRINOLOGIC:  No reports of sweating, cold or heat intolerance. No polyuria or polydipsia.      ______________________________________________________________________  PHYSICAL EXAM:  T(C): 36.5 (04-20-22 @ 13:50), Max: 36.7 (04-19-22 @ 18:27)  HR: 69 (04-20-22 @ 13:50)  BP: 115/64 (04-20-22 @ 13:50)  RR: 17 (04-20-22 @ 13:50)  SpO2: 99% (04-20-22 @ 13:50)  Wt(kg): --      GEN: NAD, normocephalic  CVS: S1S2+  CHEST: clear to auscultation  ABD: soft , nontender, nondistended, bowel sounds present  EXTR: no cyanosis, no clubbing, no edema  NEURO: Awake and alert; oriented .....  SKIN:  warm;  non icteric    ______________________________________________________________________  LABS:                        11.4   4.58  )-----------( 220      ( 20 Apr 2022 12:45 )             36.8     04-19    141  |  109<H>  |  15  ----------------------------<  98  4.3   |  27  |  0.94    Ca    9.0      19 Apr 2022 21:26    TPro  6.3  /  Alb  2.0<L>  /  TBili  0.7  /  DBili  x   /  AST  50<H>  /  ALT  23  /  AlkPhos  484<H>  04-19    LIVER FUNCTIONS - ( 19 Apr 2022 21:26 )  Alb: 2.0 g/dL / Pro: 6.3 g/dL / ALK PHOS: 484 U/L / ALT: 23 U/L DA / AST: 50 U/L / GGT: x           PT/INR - ( 20 Apr 2022 12:45 )   PT: 17.9 sec;   INR: 1.50 ratio         PTT - ( 20 Apr 2022 12:45 )  PTT:35.5 sec  ____________________________________________    IMAGING:    < from: CT Abdomen and Pelvis w/ IV Cont (04.20.22 @ 00:51) >    ACC: [id number] EXAM:  CT ABDOMEN AND PELVIS IC                          PROCEDURE DATE:  04/20/2022      INTERPRETATION:  CLINICAL INFORMATION: Abdominal pain.    COMPARISON: CT abdomen pelvis 1/3/2022. MRI abdomen 2/8/2021.    CONTRAST/COMPLICATIONS:  IV Contrast: Omnipaque 350  90 cc administered   10 cc discarded  Oral Contrast: NONE  Complications: None reported at time of study completion    PROCEDURE:  CT of the Abdomen and Pelvis was performed.  Sagittal and coronal reformats were performed.    FINDINGS:    LOWER CHEST: Aortic valve and multivessel coronary artery calcification.   Trace left pleural effusion. Dependent atelectasis/scarring of the lung   parenchyma. Few subcentimeter clustered nodules of the left lower lobe   may represent distal airways mucus impaction versus small airways   infection.    LIVER: Cirrhotic liver morphology. Few areas of nonspecific peripheral   low attenuation along segments 4b and segment 3 of unclear significance.   Main portal vein and hepatic veins are patent.    BILE DUCTS / GALLBLADDER: Extrahepatic common duct is enlarged near the   gallbladder neck, with internal 2.3 cm masslike density which could   reflect a large stone, images  of series 6. Common bile duct stent   traverses this region. Mild intrahepatic biliary distention with bile   duct enhancement. There is mural thickening at the gallbladder neck as   well. Infectious and/or neoplastic etiologies for these findings can be   considered. Correlate for any signs and symptoms of cholangitis/sepsis.   Sequelae of chronic cholecystitis with fistula to the common duct can be   considered as well, as described on prior MRI from 2/8/2021. Correlation   with contrast-enhanced MRI/MRCP is recommended for characterization.    SPLEEN: Spleen size within normal limits  PANCREAS: No main ductal dilatation. Stable subcentimeter cystic   densities up to 5 mm of the pancreatic tail, image 59 series 2, likely   sidebranch IPMN or sequelae of prior pancreatitis.  ADRENALS: Unremarkable  KIDNEYS/URETERS: No hydronephrosis. Lobulated contour of the kidneys.   Subcentimeter hypodensities of the kidneys are too small to characterize.    BLADDER: Mildly distended  REPRODUCTIVE ORGANS: Prostate size within normal limits, though  suboptimally assessed due to streak artifact from right hip prosthesis.    BOWEL: Limited evaluation the absence of oral contrast. Stomach is   underdistended. No small bowel distention. The appendix is noninflamed.   Mild stool burden of the colonlimits evaluation of the colonic mucosa.  PERITONEUM: Moderate ascites.  VESSELS: No aneurysm of the abdominal aorta. Moderate to severe   aortoiliac atheromatous changes including small peripheral mural   thrombus/plaque ulceration involving the infrarenal segment. Visceral   artery atheromatous changes also identified.  RETROPERITONEUM/LYMPH NODES: Small volume nodes, particularly in the   right upper quadrant.  ABDOMINAL WALL: Chronic right rectus sheath collection stable in size   since MRI abdomen dated 2/8/2021, now measuring 3.8 x 1.8 x 7.0 cm,   likely a chronic right rectus sheath hematoma. High density material   within the collection is of unclear significance, not seen on the prior   noncontrasted CT. Correlate with hemodynamics and hematocrit of the   patient. High density material could reflect a prominent tortuous vessel   versus other blood products. In the setting of prior intervention,   calcified and/or other embolic material can be considered. Soft tissue   edema alongthe abdominal wall and flanks. Mild soft tissue thickening   adjacent to the sacrum/coccyx, image 136 series 2. Correlate for   potential sacral cubitus ulceration. Gynecomastia. Small fat-containing   umbilical hernia  BONES: Degenerative changes. Right hip prosthesis partially imaged.    IMPRESSION:    Extrahepatic common duct is enlarged near the gallbladder neck, with   internal 2.3 cm masslike density which could reflect a large stone,   images  of series 6. Common bile duct stent traverses this region.   Mild intrahepatic biliary distention with bile duct enhancement. There is   mural thickening at the gallbladder neck as well. Infectious and/or   neoplastic etiologies for these findings can be considered. Correlate for   any signs and symptoms of cholangitis/sepsis. Sequelae of chronic   cholecystitis with fistula to the common duct can be considered as well,   as described on prior MRI from 2/8/2021. Correlation with   contrast-enhanced MRI/MRCP is recommended for characterization.    Cirrhotic liver morphology. Moderate ascites. Few areas of nonspecific   peripheral low attenuation along segments 4b and segment 3 of unclear   significance.    Chronic right rectus sheath collection/hematoma, stable in size. High   density material within the collection is of unclear significance, not   seen on the prior noncontrasted CT from 1/3/2022 Correlate with   hemodynamics and hematocrit of the patient. High density material could   reflect a prominent tortuous vessel versus other blood products. In the   setting of prior intervention, calcified and/or other embolic material   can be considered.    Trace left pleural effusion. Few clustered left lower lobe nodules may   represent distal airways mucus impaction versus smallairways infection.    Mild soft tissue thickening adjacent to the sacrum/coccyx, image 136   series 2. Correlate for potential sacral cubitus ulceration.

## 2022-04-20 NOTE — PATIENT PROFILE ADULT - HOW PATIENT ADDRESSED, PROFILE
Pt was seen today in [facility] for a Phase 2 visit.  Vital signs and session notes recorded in [employer] and will be scanned into Epic by HIM.  
Anthony

## 2022-04-21 ENCOUNTER — RESULT REVIEW (OUTPATIENT)
Age: 82
End: 2022-04-21

## 2022-04-21 DIAGNOSIS — R26.2 DIFFICULTY IN WALKING, NOT ELSEWHERE CLASSIFIED: ICD-10-CM

## 2022-04-21 LAB
A1C WITH ESTIMATED AVERAGE GLUCOSE RESULT: 5.2 % — SIGNIFICANT CHANGE UP (ref 4–5.6)
ALBUMIN SERPL ELPH-MCNC: 1.7 G/DL — LOW (ref 3.5–5)
ALP SERPL-CCNC: 397 U/L — HIGH (ref 40–120)
ALT FLD-CCNC: 20 U/L DA — SIGNIFICANT CHANGE UP (ref 10–60)
ANION GAP SERPL CALC-SCNC: 6 MMOL/L — SIGNIFICANT CHANGE UP (ref 5–17)
AST SERPL-CCNC: 42 U/L — HIGH (ref 10–40)
B PERT IGG+IGM PNL SER: CLEAR — SIGNIFICANT CHANGE UP
BILIRUB SERPL-MCNC: 0.6 MG/DL — SIGNIFICANT CHANGE UP (ref 0.2–1.2)
BUN SERPL-MCNC: 12 MG/DL — SIGNIFICANT CHANGE UP (ref 7–18)
CALCIUM SERPL-MCNC: 8.9 MG/DL — SIGNIFICANT CHANGE UP (ref 8.4–10.5)
CHLORIDE SERPL-SCNC: 111 MMOL/L — HIGH (ref 96–108)
CO2 SERPL-SCNC: 25 MMOL/L — SIGNIFICANT CHANGE UP (ref 22–31)
COLOR FLD: YELLOW — SIGNIFICANT CHANGE UP
COMMENT - FLUIDS: SIGNIFICANT CHANGE UP
CREAT SERPL-MCNC: 0.94 MG/DL — SIGNIFICANT CHANGE UP (ref 0.5–1.3)
EGFR: 81 ML/MIN/1.73M2 — SIGNIFICANT CHANGE UP
ESTIMATED AVERAGE GLUCOSE: 103 MG/DL — SIGNIFICANT CHANGE UP (ref 68–114)
FERRITIN SERPL-MCNC: 60 NG/ML — SIGNIFICANT CHANGE UP (ref 30–400)
FLUID INTAKE SUBSTANCE CLASS: SIGNIFICANT CHANGE UP
FOLATE+VIT B12 SERBLD-IMP: 2 % — SIGNIFICANT CHANGE UP
GLUCOSE SERPL-MCNC: 86 MG/DL — SIGNIFICANT CHANGE UP (ref 70–99)
HCT VFR BLD CALC: 31.3 % — LOW (ref 39–50)
HGB BLD-MCNC: 10 G/DL — LOW (ref 13–17)
IRON SATN MFR SERPL: 14 % — LOW (ref 20–55)
IRON SATN MFR SERPL: 31 UG/DL — LOW (ref 65–170)
LYMPHOCYTES # FLD: 21 % — SIGNIFICANT CHANGE UP
MAGNESIUM SERPL-MCNC: 1.6 MG/DL — SIGNIFICANT CHANGE UP (ref 1.6–2.6)
MCHC RBC-ENTMCNC: 25.2 PG — LOW (ref 27–34)
MCHC RBC-ENTMCNC: 31.9 GM/DL — LOW (ref 32–36)
MCV RBC AUTO: 78.8 FL — LOW (ref 80–100)
MESOTHL CELL # FLD: 42 % — SIGNIFICANT CHANGE UP
MONOS+MACROS # FLD: 14 % — SIGNIFICANT CHANGE UP
NEUTROPHILS-BODY FLUID: 21 % — SIGNIFICANT CHANGE UP
NRBC # BLD: 0 /100 WBCS — SIGNIFICANT CHANGE UP (ref 0–0)
PHOSPHATE SERPL-MCNC: 2.8 MG/DL — SIGNIFICANT CHANGE UP (ref 2.5–4.5)
PLATELET # BLD AUTO: 126 K/UL — LOW (ref 150–400)
POTASSIUM SERPL-MCNC: 4.3 MMOL/L — SIGNIFICANT CHANGE UP (ref 3.5–5.3)
POTASSIUM SERPL-SCNC: 4.3 MMOL/L — SIGNIFICANT CHANGE UP (ref 3.5–5.3)
PROT SERPL-MCNC: 5.6 G/DL — LOW (ref 6–8.3)
RBC # BLD: 3.97 M/UL — LOW (ref 4.2–5.8)
RBC # FLD: 18.9 % — HIGH (ref 10.3–14.5)
RCV VOL RI: 110 /UL — HIGH (ref 0–5)
SODIUM SERPL-SCNC: 142 MMOL/L — SIGNIFICANT CHANGE UP (ref 135–145)
TIBC SERPL-MCNC: 219 UG/DL — LOW (ref 250–450)
TOTAL NUCLEATED CELL COUNT, BODY FLUID: 225 /UL — SIGNIFICANT CHANGE UP
TRANSFERRIN SERPL-MCNC: 163 MG/DL — LOW (ref 200–360)
TUBE TYPE: SIGNIFICANT CHANGE UP
UIBC SERPL-MCNC: 188 UG/DL — SIGNIFICANT CHANGE UP (ref 110–370)
WBC # BLD: 4.3 K/UL — SIGNIFICANT CHANGE UP (ref 3.8–10.5)
WBC # FLD AUTO: 4.3 K/UL — SIGNIFICANT CHANGE UP (ref 3.8–10.5)

## 2022-04-21 PROCEDURE — 49083 ABD PARACENTESIS W/IMAGING: CPT

## 2022-04-21 PROCEDURE — 99233 SBSQ HOSP IP/OBS HIGH 50: CPT

## 2022-04-21 PROCEDURE — 99232 SBSQ HOSP IP/OBS MODERATE 35: CPT | Mod: 25

## 2022-04-21 PROCEDURE — 88305 TISSUE EXAM BY PATHOLOGIST: CPT | Mod: 26

## 2022-04-21 PROCEDURE — 88112 CYTOPATH CELL ENHANCE TECH: CPT | Mod: 26

## 2022-04-21 PROCEDURE — 49082 ABD PARACENTESIS: CPT

## 2022-04-21 RX ORDER — SENNA PLUS 8.6 MG/1
2 TABLET ORAL AT BEDTIME
Refills: 0 | Status: DISCONTINUED | OUTPATIENT
Start: 2022-04-21 | End: 2022-04-26

## 2022-04-21 RX ORDER — FERROUS SULFATE 325(65) MG
325 TABLET ORAL DAILY
Refills: 0 | Status: DISCONTINUED | OUTPATIENT
Start: 2022-04-21 | End: 2022-04-26

## 2022-04-21 RX ADMIN — PIPERACILLIN AND TAZOBACTAM 25 GRAM(S): 4; .5 INJECTION, POWDER, LYOPHILIZED, FOR SOLUTION INTRAVENOUS at 22:14

## 2022-04-21 RX ADMIN — PIPERACILLIN AND TAZOBACTAM 25 GRAM(S): 4; .5 INJECTION, POWDER, LYOPHILIZED, FOR SOLUTION INTRAVENOUS at 06:57

## 2022-04-21 RX ADMIN — SENNA PLUS 2 TABLET(S): 8.6 TABLET ORAL at 22:14

## 2022-04-21 RX ADMIN — PANTOPRAZOLE SODIUM 40 MILLIGRAM(S): 20 TABLET, DELAYED RELEASE ORAL at 17:46

## 2022-04-21 RX ADMIN — PIPERACILLIN AND TAZOBACTAM 25 GRAM(S): 4; .5 INJECTION, POWDER, LYOPHILIZED, FOR SOLUTION INTRAVENOUS at 14:25

## 2022-04-21 RX ADMIN — LACTULOSE 10 GRAM(S): 10 SOLUTION ORAL at 06:57

## 2022-04-21 RX ADMIN — LACTULOSE 10 GRAM(S): 10 SOLUTION ORAL at 18:56

## 2022-04-21 RX ADMIN — PANTOPRAZOLE SODIUM 40 MILLIGRAM(S): 20 TABLET, DELAYED RELEASE ORAL at 07:04

## 2022-04-21 RX ADMIN — SPIRONOLACTONE 50 MILLIGRAM(S): 25 TABLET, FILM COATED ORAL at 06:58

## 2022-04-21 RX ADMIN — Medication 20 MILLIGRAM(S): at 06:58

## 2022-04-21 NOTE — PROGRESS NOTE ADULT - PROBLEM SELECTOR PLAN 4
sx consult appreciated  no surgical intervention at this time Monitor CBC  labs c/w KING  iron supplementation  PPI  GI onboard Dr. Rinaldi

## 2022-04-21 NOTE — PROCEDURE NOTE - PROCEDURE FINDINGS AND DETAILS
2100 cc cloudy light serous fluid drained. Catheter removed and a sterile  dressing applied.  Specimens were obtained and sent for a complete evaluation including cytology.

## 2022-04-21 NOTE — PROGRESS NOTE ADULT - SUBJECTIVE AND OBJECTIVE BOX
NP Note discussed with  Primary Attending    Patient is a 82y old  Male who presents with a chief complaint of Suspected GI bleed (2022 14:47)      INTERVAL HPI/OVERNIGHT EVENTS: no new complaints, reports feeling better s/p paracentesis    MEDICATIONS  (STANDING):  ferrous    sulfate 325 milliGRAM(s) Oral daily  furosemide    Tablet 20 milliGRAM(s) Oral daily  lactulose Syrup 10 Gram(s) Oral two times a day  pantoprazole  Injectable 40 milliGRAM(s) IV Push every 12 hours  piperacillin/tazobactam IVPB.. 3.375 Gram(s) IV Intermittent every 8 hours  propranolol 10 milliGRAM(s) Oral daily  senna 2 Tablet(s) Oral at bedtime  spironolactone 50 milliGRAM(s) Oral daily    MEDICATIONS  (PRN):      __________________________________________________  REVIEW OF SYSTEMS:    CONSTITUTIONAL: No fever,   EYES: no acute visual disturbances  NECK: No pain or stiffness  RESPIRATORY: No cough; No shortness of breath  CARDIOVASCULAR: No chest pain, no palpitations  GASTROINTESTINAL: No pain. No nausea or vomiting; No diarrhea   NEUROLOGICAL: No headache or numbness, no tremors  MUSCULOSKELETAL: No joint pain, no muscle pain  GENITOURINARY: no dysuria, no frequency, no hesitancy  PSYCHIATRY: no depression , no anxiety  ALL OTHER  ROS negative        Vital Signs Last 24 Hrs  T(C): 36.3 (2022 16:24), Max: 36.5 (2022 09:45)  T(F): 97.3 (2022 16:24), Max: 97.7 (2022 09:45)  HR: 63 (2022 16:24) (63 - 88)  BP: 132/67 (2022 16:24) (118/63 - 132/67)  BP(mean): --  RR: 18 (2022 16:24) (17 - 18)  SpO2: 100% (2022 16:24) (99% - 100%)    ________________________________________________  PHYSICAL EXAM:  GENERAL: NAD  HEENT: Normocephalic;  conjunctivae and sclerae clear; moist mucous membranes;   NECK : supple  CHEST/LUNG: Clear to auscultation bilaterally with good air entry   HEART: S1 S2  regular; no murmurs, gallops or rubs  ABDOMEN: Soft, Nontender, Nondistended; Bowel sounds present  EXTREMITIES: no cyanosis; no edema; no calf tenderness  SKIN: warm and dry; no rash  NERVOUS SYSTEM:  Awake and alert; Oriented  to place, person and time ; no new deficits    _________________________________________________  LABS:                        10.0   4.30  )-----------( 126      ( 2022 07:22 )             31.3     -    142  |  111<H>  |  12  ----------------------------<  86  4.3   |  25  |  0.94    Ca    8.9      2022 07:22  Phos  2.8     -  Mg     1.6         TPro  5.6<L>  /  Alb  1.7<L>  /  TBili  0.6  /  DBili  x   /  AST  42<H>  /  ALT  20  /  AlkPhos  397<H>      PT/INR - ( 2022 12:45 )   PT: 17.9 sec;   INR: 1.50 ratio         PTT - ( 2022 12:45 )  PTT:35.5 sec  Urinalysis Basic - ( 2022 02:49 )    Color: Yellow / Appearance: Clear / S.015 / pH: x  Gluc: x / Ketone: Negative  / Bili: Negative / Urobili: Negative   Blood: x / Protein: Negative / Nitrite: Positive   Leuk Esterase: Trace / RBC: 0-2 /HPF / WBC 3-5 /HPF   Sq Epi: x / Non Sq Epi: Few /HPF / Bacteria: Moderate /HPF      CAPILLARY BLOOD GLUCOSE            RADIOLOGY & ADDITIONAL TESTS:    Imaging Personally Reviewed:  YES/NO    Consultant(s) Notes Reviewed:   YES/ No    Care Discussed with Consultants :     Plan of care was discussed with patient and /or primary care giver; all questions and concerns were addressed and care was aligned with patient's wishes.

## 2022-04-21 NOTE — PROGRESS NOTE ADULT - PROBLEM SELECTOR PLAN 2
- History of Mirizzi's s/p biliary stenting  - considered poor surgical candidate for cholecystectomy  - old retained stent (placed 6/2021, lost to f/u)  - underwent EHL and biliary stent placement in 6/2021

## 2022-04-21 NOTE — PROGRESS NOTE ADULT - SUBJECTIVE AND OBJECTIVE BOX
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 Pt is AxO 2 on assessment , not a detailed historian. Pt stated that he has been noticing that for past few days his abdomen is getting bigger and tighter associate with generalized abdominal pain which is mild in intensity. Pt further stated that his wife noticing black-brown stool for past one month. Pt denied fever, nausea , vomiting, diarrhea, hematemesis, headaches, urinary difficulty. Pt doesn't remember whether he has paracentesis or not in the past. Called patient wife multiple time and daughter for collateral hx but no answer.     Of note: pt admitted at [facility] in Jan & Feb for GI bleed and UTI/COVID respectively. For GI bleed pt H/H was stable in the hospital and no bleeding noted in the hospital and no GI intervention was performed. Per prior records last colonoscopy 4/2020 -showing rectal varices, vascular ectasia in ascending in descending colon s/p ablation. Endoscopy 4/20 > The esophagus contained trace varices.                          ED Course: Hgb 11 (at his baseline) , no episodes of melena or hematemesis ,  UA neg. CT A/P showed Extrahepatic common duct is enlarged near the gallbladder neck, with internal 2.3 cm masslike density which could reflect a large stone. Common bile duct stent traverses this region. Mild intrahepatic biliary distention with bile duct enhancement. There is mural thickening at the gallbladder neck as well. Infectious and/or neoplastic etiologies for these findings can be considered. Correlate for any signs and symptoms of cholangitis/sepsis. Sequelae of chronic   cholecystitis with fistula to the common duct can be considered as well, as described on prior MRI from 2/8/2021. Cirrhotic liver morphology. Moderate ascites. .Chronic right rectus sheath collection/hematoma, stable in size. High density material within the collection is of unclear significance, not seen on the prior noncontrasted CT from 1/3/2022 Correlate with hemodynamics and hematocrit of the patient. High density material could reflect a prominent tortuous vessel versus other blood products. In the setting of prior intervention, calcified and/or other embolic material can be considered.    GI HPI:  Pt denies abdominal pain, nausea, vomiting. Overall, pt feels well.           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)    Pancreatitis  2/2 choledocholithiasis s/p ERCP with stone extraction and plastic stent placement (11/2019, stent now removed)    Hematochezia  2/2 colonic AVMs s/p cauterization and hemorrhoids (11/2019)    Melena  2/2 duodenal ulcers s/p argon plasma coagulation (4/2020)    Chronic kidney disease (CKD)    H/O inguinal hernia repair    History of repair of hip fracture  R hip    History of hip replacement  10/2020      ______________________________________________________________________  MEDS:  MEDICATIONS  (STANDING):  furosemide    Tablet 20 milliGRAM(s) Oral daily  lactulose Syrup 10 Gram(s) Oral two times a day  pantoprazole  Injectable 40 milliGRAM(s) IV Push every 12 hours  piperacillin/tazobactam IVPB.. 3.375 Gram(s) IV Intermittent every 8 hours  propranolol 10 milliGRAM(s) Oral daily  senna 2 Tablet(s) Oral at bedtime  spironolactone 50 milliGRAM(s) Oral daily    MEDICATIONS  (PRN):    ______________________________________________________________________  ALL:   Allergies    No Known Allergies    Intolerances      ______________________________________________________________________  SH: Social History:  former heavy drinker, denies smoking and drug abuse (20 Apr 2022 06:04)    ______________________________________________________________________  FH:  FAMILY HISTORY:  Family history of ovarian cancer (Sibling)      ______________________________________________________________________  ROS:    CONSTITUTIONAL:  No weight loss, fever, chills, weakness or fatigue.    SKIN:  No rash or itching.    CARDIOVASCULAR:  No chest pain, chest pressure or chest discomfort. No palpitations or edema.    RESPIRATORY:  No shortness of breath, cough or sputum.    GASTROINTESTINAL:  SEE HPI    GENITOURINARY:  No dysuria, hematuria, urinary frequency    NEUROLOGICAL:  No headache, dizziness, syncope    MUSCULOSKELETAL:  No muscle, back pain, joint pain or stiffness.    ______________________________________________________________________  PHYSICAL EXAM:  T(C): 36.5 (04-21-22 @ 09:45), Max: 36.5 (04-21-22 @ 09:45)  HR: 65 (04-21-22 @ 09:45)  BP: 118/73 (04-21-22 @ 05:27)  RR: 17 (04-21-22 @ 05:27)  SpO2: 99% (04-21-22 @ 05:27)  Wt(kg): --      GEN: NAD   CVS- S1 S2  ABD: soft, nontender, + distended, bowel sounds+  LUNGS: clear to auscultation  NEURO: noin focal neuro exam; AAO x 2-3  Extremities: no cyanosis, no calf tenderness, no edema, no clubbing      ______________________________________________________________________  LABS:                        10.0   4.30  )-----------( 126      ( 21 Apr 2022 07:22 )             31.3     04-21    142  |  111<H>  |  12  ----------------------------<  86  4.3   |  25  |  0.94    Ca    8.9      21 Apr 2022 07:22  Phos  2.8     04-21  Mg     1.6     04-21    TPro  5.6<L>  /  Alb  1.7<L>  /  TBili  0.6  /  DBili  x   /  AST  42<H>  /  ALT  20  /  AlkPhos  397<H>  04-21    LIVER FUNCTIONS - ( 21 Apr 2022 07:22 )  Alb: 1.7 g/dL / Pro: 5.6 g/dL / ALK PHOS: 397 U/L / ALT: 20 U/L DA / AST: 42 U/L / GGT: x

## 2022-04-21 NOTE — PROGRESS NOTE ADULT - PROBLEM SELECTOR PLAN 1
- CT A/P: biliary stent in place with biliary dilation and known large cystic duct stone along with moderate ascites (per ID possible cholangitis given signs of biliary obstruction on CT)  - Recommend diagnostic paracentesis r/o SBP given pain and ascites. If negative, tentative plan for ERCP with biliary stent removal and cystic duct stone tomorrow - CT A/P: biliary stent in place with biliary dilation and known large cystic duct stone along with moderate ascites (per ID possible cholangitis given signs of biliary obstruction on CT)  - Recommend diagnostic paracentesis r/o SBP given pain and ascites. If negative, tentative plan for ERCP with biliary stent removal and cystic duct stone tomorrow  - NPO after midnight  - No AC for the procedure tomorrow

## 2022-04-21 NOTE — PROGRESS NOTE ADULT - ASSESSMENT
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

## 2022-04-21 NOTE — PROGRESS NOTE ADULT - PROBLEM SELECTOR PLAN 6
known Mirizzi's syndrome   considered poor surgical candidate for cholecystectomy so underwent EHL and biliary stent placement in 6/2021  , tentative plans for ERCP with biliary stent removal and cystic duct stone Friday. SCD

## 2022-04-21 NOTE — PROGRESS NOTE ADULT - ASSESSMENT
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 GI consulted for abdominal pain and possible cholangitis.

## 2022-04-21 NOTE — PROGRESS NOTE ADULT - PROBLEM SELECTOR PLAN 1
s/p paracentesis today with 2100 cc cloudy light serous fluid drained.  Specimens were obtained and sent for a complete evaluation including cytology.  tentative plan for ERCP with biliary stent and cystic duct stone removal tomorrow.  Empiric abx with Zosyn - ID following

## 2022-04-22 ENCOUNTER — TRANSCRIPTION ENCOUNTER (OUTPATIENT)
Age: 82
End: 2022-04-22

## 2022-04-22 LAB
ALBUMIN FLD-MCNC: 0.4 G/DL — SIGNIFICANT CHANGE UP
ALBUMIN SERPL ELPH-MCNC: 1.8 G/DL — LOW (ref 3.5–5)
ALP SERPL-CCNC: 478 U/L — HIGH (ref 40–120)
ALT FLD-CCNC: 28 U/L DA — SIGNIFICANT CHANGE UP (ref 10–60)
ANION GAP SERPL CALC-SCNC: 7 MMOL/L — SIGNIFICANT CHANGE UP (ref 5–17)
AST SERPL-CCNC: 66 U/L — HIGH (ref 10–40)
BILIRUB SERPL-MCNC: 0.5 MG/DL — SIGNIFICANT CHANGE UP (ref 0.2–1.2)
BLD GP AB SCN SERPL QL: SIGNIFICANT CHANGE UP
BUN SERPL-MCNC: 10 MG/DL — SIGNIFICANT CHANGE UP (ref 7–18)
CALCIUM SERPL-MCNC: 8.6 MG/DL — SIGNIFICANT CHANGE UP (ref 8.4–10.5)
CHLORIDE SERPL-SCNC: 107 MMOL/L — SIGNIFICANT CHANGE UP (ref 96–108)
CO2 SERPL-SCNC: 25 MMOL/L — SIGNIFICANT CHANGE UP (ref 22–31)
CREAT SERPL-MCNC: 1 MG/DL — SIGNIFICANT CHANGE UP (ref 0.5–1.3)
EGFR: 75 ML/MIN/1.73M2 — SIGNIFICANT CHANGE UP
GLUCOSE FLD-MCNC: 140 MG/DL — SIGNIFICANT CHANGE UP
GLUCOSE SERPL-MCNC: 114 MG/DL — HIGH (ref 70–99)
GRAM STN FLD: SIGNIFICANT CHANGE UP
HCT VFR BLD CALC: 31.5 % — LOW (ref 39–50)
HGB BLD-MCNC: 10 G/DL — LOW (ref 13–17)
INR BLD: 1.55 RATIO — HIGH (ref 0.88–1.16)
MCHC RBC-ENTMCNC: 24.8 PG — LOW (ref 27–34)
MCHC RBC-ENTMCNC: 31.7 GM/DL — LOW (ref 32–36)
MCV RBC AUTO: 78 FL — LOW (ref 80–100)
MELD SCORE WITH DIALYSIS: 25 POINTS — SIGNIFICANT CHANGE UP
MELD SCORE WITHOUT DIALYSIS: 11 POINTS — SIGNIFICANT CHANGE UP
NRBC # BLD: 0 /100 WBCS — SIGNIFICANT CHANGE UP (ref 0–0)
PLATELET # BLD AUTO: 203 K/UL — SIGNIFICANT CHANGE UP (ref 150–400)
POTASSIUM SERPL-MCNC: 3.8 MMOL/L — SIGNIFICANT CHANGE UP (ref 3.5–5.3)
POTASSIUM SERPL-SCNC: 3.8 MMOL/L — SIGNIFICANT CHANGE UP (ref 3.5–5.3)
PROT SERPL-MCNC: 5.6 G/DL — LOW (ref 6–8.3)
PROTHROM AB SERPL-ACNC: 18.5 SEC — HIGH (ref 10.5–13.4)
RBC # BLD: 4.04 M/UL — LOW (ref 4.2–5.8)
RBC # FLD: 18.6 % — HIGH (ref 10.3–14.5)
SODIUM SERPL-SCNC: 139 MMOL/L — SIGNIFICANT CHANGE UP (ref 135–145)
SPECIMEN SOURCE: SIGNIFICANT CHANGE UP
WBC # BLD: 5.11 K/UL — SIGNIFICANT CHANGE UP (ref 3.8–10.5)
WBC # FLD AUTO: 5.11 K/UL — SIGNIFICANT CHANGE UP (ref 3.8–10.5)

## 2022-04-22 PROCEDURE — 99233 SBSQ HOSP IP/OBS HIGH 50: CPT

## 2022-04-22 PROCEDURE — 43276 ERCP STENT EXCHANGE W/DILATE: CPT

## 2022-04-22 PROCEDURE — 43235 EGD DIAGNOSTIC BRUSH WASH: CPT | Mod: 59

## 2022-04-22 PROCEDURE — 43264 ERCP REMOVE DUCT CALCULI: CPT

## 2022-04-22 DEVICE — STENT BIL COTN-L 10FRX9CM: Type: IMPLANTABLE DEVICE | Status: FUNCTIONAL

## 2022-04-22 DEVICE — STENT BIL COTN-L 10FRX10CM: Type: IMPLANTABLE DEVICE | Status: FUNCTIONAL

## 2022-04-22 DEVICE — HYDRATOME 44: Type: IMPLANTABLE DEVICE | Status: FUNCTIONAL

## 2022-04-22 DEVICE — STENT ADVANIX BILIARY CTR BEND 10FRX7CM: Type: IMPLANTABLE DEVICE | Status: FUNCTIONAL

## 2022-04-22 DEVICE — STENT BIL COTN-L 10FRX12CM: Type: IMPLANTABLE DEVICE | Status: FUNCTIONAL

## 2022-04-22 DEVICE — CATH 3 LUMEN EXTRACTIN BALLOON 15MM: Type: IMPLANTABLE DEVICE | Status: FUNCTIONAL

## 2022-04-22 DEVICE — STENT BIL WALL RX 10FRX7CM: Type: IMPLANTABLE DEVICE | Status: FUNCTIONAL

## 2022-04-22 DEVICE — BLLN EXTRCTR PRO RX-S 9-12MM: Type: IMPLANTABLE DEVICE | Status: FUNCTIONAL

## 2022-04-22 DEVICE — STENT BIL COTN-L 10FRX7CM: Type: IMPLANTABLE DEVICE | Status: FUNCTIONAL

## 2022-04-22 DEVICE — JAGWIRE HYDRA STR .035 X 260CM: Type: IMPLANTABLE DEVICE | Status: FUNCTIONAL

## 2022-04-22 DEVICE — DREAMWIRE STD .035IN STRT: Type: IMPLANTABLE DEVICE | Status: FUNCTIONAL

## 2022-04-22 DEVICE — STENT BIL OASIS 1ACT 11.5FR INTRO SYS: Type: IMPLANTABLE DEVICE | Status: FUNCTIONAL

## 2022-04-22 RX ADMIN — PIPERACILLIN AND TAZOBACTAM 25 GRAM(S): 4; .5 INJECTION, POWDER, LYOPHILIZED, FOR SOLUTION INTRAVENOUS at 06:27

## 2022-04-22 RX ADMIN — PIPERACILLIN AND TAZOBACTAM 25 GRAM(S): 4; .5 INJECTION, POWDER, LYOPHILIZED, FOR SOLUTION INTRAVENOUS at 13:25

## 2022-04-22 RX ADMIN — PIPERACILLIN AND TAZOBACTAM 25 GRAM(S): 4; .5 INJECTION, POWDER, LYOPHILIZED, FOR SOLUTION INTRAVENOUS at 21:20

## 2022-04-22 RX ADMIN — PANTOPRAZOLE SODIUM 40 MILLIGRAM(S): 20 TABLET, DELAYED RELEASE ORAL at 06:28

## 2022-04-22 RX ADMIN — SENNA PLUS 2 TABLET(S): 8.6 TABLET ORAL at 21:20

## 2022-04-22 RX ADMIN — LACTULOSE 10 GRAM(S): 10 SOLUTION ORAL at 06:27

## 2022-04-22 NOTE — DIETITIAN INITIAL EVALUATION ADULT - NSICDXPASTMEDICALHX_GEN_ALL_CORE_FT
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

## 2022-04-22 NOTE — PROGRESS NOTE ADULT - PROBLEM SELECTOR PLAN 2
c/w lactulose, Lasix and propranolol   4/22  MELD Score without Dialysis: 11 points (04.22.22 @ 07:01)  daily pt/inr

## 2022-04-22 NOTE — PROGRESS NOTE ADULT - SUBJECTIVE AND OBJECTIVE BOX
Patient is a 82y old  Male who presents with a chief complaint of Suspected GI bleed (21 Apr 2022 14:47)      INTERVAL HPI/OVERNIGHT EVENTS: no new complaints, reports feeling better s/p paracentesis. Plan for ERCP w/stone extraction today.       ferrous    sulfate 325 milliGRAM(s) Oral daily  furosemide    Tablet 20 milliGRAM(s) Oral daily  lactulose Syrup 10 Gram(s) Oral two times a day  pantoprazole  Injectable 40 milliGRAM(s) IV Push every 12 hours  piperacillin/tazobactam IVPB.. 3.375 Gram(s) IV Intermittent every 8 hours  propranolol 10 milliGRAM(s) Oral daily  senna 2 Tablet(s) Oral at bedtime  spironolactone 50 milliGRAM(s) Oral daily                            10.0   5.11  )-----------( 203      ( 22 Apr 2022 07:01 )             31.5       Hemoglobin: 10.0 g/dL (04-22 @ 07:01)  Hemoglobin: 10.0 g/dL (04-21 @ 07:22)  Hemoglobin: 11.4 g/dL (04-20 @ 12:45)  Hemoglobin: 11.2 g/dL (04-19 @ 21:26)      04-22    139  |  107  |  10  ----------------------------<  114<H>  3.8   |  25  |  1.00    Ca    8.6      22 Apr 2022 07:01  Phos  2.8     04-21  Mg     1.6     04-21    TPro  5.6<L>  /  Alb  1.8<L>  /  TBili  0.5  /  DBili  x   /  AST  66<H>  /  ALT  28  /  AlkPhos  478<H>  04-22    Creatinine Trend: 1.00<--, 0.94<--, 0.94<--    COAGS: PT/INR - ( 22 Apr 2022 07:01 )   PT: 18.5 sec;   INR: 1.55 ratio         Physical Exam:     General: No distress. Comfortable.  HEENT: EOM intact.  Neck: Neck supple. JVP not elevated. No masses  Chest: Clear to auscultation bilaterally  CV: s1 s2 RRR no murmurs, rubs or gallops   Abdomen: Soft, non-distended, non-tender  Extremity : + PP no c/c/e   Skin: No rashes or skin breakdown  Neurology: Alert and oriented times three. Sensation intact  Psych: Affect normal          PHYSICAL EXAM:  Vital Signs last 24 Hours   T(C): 36.4 (04-22-22 @ 05:18), Max: 36.5 (04-21-22 @ 21:04)  HR: 74 (04-22-22 @ 05:18) (63 - 74)  BP: 103/65 (04-22-22 @ 05:18) (103/65 - 132/80)  RR: 17 (04-22-22 @ 05:18) (17 - 18)  SpO2: 96% (04-22-22 @ 05:18) (96% - 100%)  Wt(kg): --    I&O's Summary        RADIOLOGY & ADDITIONAL TESTS:    Imaging Personally Reviewed:  YES/NO    Consultant(s) Notes Reviewed:   YES/ No    Care Discussed with Consultants :     Plan of care was discussed with patient and /or primary care giver; all questions and concerns were addressed and care was aligned with patient's wishes.

## 2022-04-22 NOTE — ADVANCED PRACTICE NURSE CONSULT - RECOMMEDATIONS
-Clean all wounds with normal saline and apply skin prep to the surrounding skin  -Apply a Foam dressing to the Coccyx area Q 72hrs PRN  -Leave the R. Achillis wound open to air  -Elevate/float the patiens heels using heel protectors and reposition Q 2hrs using wedges or pillows

## 2022-04-22 NOTE — PROGRESS NOTE ADULT - PROBLEM SELECTOR PLAN 1
s/p paracentesis today with 2100 cc cloudy light serous fluid drained.  Specimens were obtained and sent for a complete evaluation including cytology   plan for ERCP with biliary stent and cystic duct stone removal  Empiric abx with Zosyn - ID following, day 3/7 s/p paracentesis today with 2100 cc cloudy light serous fluid drained.  Specimens were obtained and sent for a complete evaluation including cytology   plan for ERCP with biliary stent and cystic duct stone removal  Empiric abx with Zosyn day 3/7  ID following Dr. Peters

## 2022-04-22 NOTE — PROGRESS NOTE ADULT - ASSESSMENT
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 Also found with UTI on IV Zosyn day 3/7. Plan for OR today for ERCP with stone extraction and possible stent.

## 2022-04-22 NOTE — DIETITIAN INITIAL EVALUATION ADULT - OTHER INFO
Multiple pressure injuries including unstageable pressure injury to right achilli's heel. Noted w/ increasing abdominal girth PTA, with 1 month black-brown stool PTA. Pt seen, asleep, NPO. Noted as A+O x2. This RN's first day w/ pt. Pt assessed by RD (Hannah) 2/22/22 with severe PCM (weight loss/ poor PO, severe temporal wasting) w/wt 138.6# (current wt 149.6 with increased abdominal girth noted).7#

## 2022-04-22 NOTE — DIETITIAN INITIAL EVALUATION ADULT - PERTINENT MEDS FT
MEDICATIONS  (STANDING):  ferrous    sulfate 325 milliGRAM(s) Oral daily  furosemide    Tablet 20 milliGRAM(s) Oral daily  lactulose Syrup 10 Gram(s) Oral two times a day  pantoprazole  Injectable 40 milliGRAM(s) IV Push every 12 hours  piperacillin/tazobactam IVPB.. 3.375 Gram(s) IV Intermittent every 8 hours  propranolol 10 milliGRAM(s) Oral daily  senna 2 Tablet(s) Oral at bedtime  spironolactone 50 milliGRAM(s) Oral daily    MEDICATIONS  (PRN):

## 2022-04-22 NOTE — DIETITIAN INITIAL EVALUATION ADULT - PERTINENT LABORATORY DATA
04-22    139  |  107  |  10  ----------------------------<  114<H>  3.8   |  25  |  1.00    Ca    8.6      22 Apr 2022 07:01  Phos  2.8     04-21  Mg     1.6     04-21    TPro  5.6<L>  /  Alb  1.8<L>  /  TBili  0.5  /  DBili  x   /  AST  66<H>  /  ALT  28  /  AlkPhos  478<H>  04-22  A1C with Estimated Average Glucose Result: 5.2 % (04-21-22 @ 12:56)  A1C with Estimated Average Glucose Result: 4.9 % (01-03-22 @ 10:20)  A1C with Estimated Average Glucose Result: 5.3 % (07-22-21 @ 10:19)

## 2022-04-22 NOTE — DIETITIAN INITIAL EVALUATION ADULT - NSFNSPHYEXAMSKINFT_GEN_A_CORE
Pressure Injury 1: Right:,achilles tendon, Unstageable  Pressure Injury 2: coccyx, Stage I  Pressure Injury 3: none, none  Pressure Injury 4: none, none  Pressure Injury 5: none, none  Pressure Injury 6: none, none  Pressure Injury 7: none, none  Pressure Injury 8: none, none  Pressure Injury 9: none, none  Pressure Injury 10: none, none  Pressure Injury 11: none, none

## 2022-04-22 NOTE — ADVANCED PRACTICE NURSE CONSULT - ASSESSMENT
This is a 82yr old male patient admitted for UTI, presenting with the following:  -There is an Unsatgeable Pressure Injury to the R. Achillis with stable eschar and no drainage  -There is blanchable erythema to the Bilateral Heels  -There is a Stage 1 Pressure Injury to the Coccyx, as evident by non-blanchable erythema

## 2022-04-23 ENCOUNTER — TRANSCRIPTION ENCOUNTER (OUTPATIENT)
Age: 82
End: 2022-04-23

## 2022-04-23 LAB
ANION GAP SERPL CALC-SCNC: 6 MMOL/L — SIGNIFICANT CHANGE UP (ref 5–17)
APTT BLD: 31.7 SEC — SIGNIFICANT CHANGE UP (ref 27.5–35.5)
BUN SERPL-MCNC: 8 MG/DL — SIGNIFICANT CHANGE UP (ref 7–18)
CALCIUM SERPL-MCNC: 8.6 MG/DL — SIGNIFICANT CHANGE UP (ref 8.4–10.5)
CHLORIDE SERPL-SCNC: 107 MMOL/L — SIGNIFICANT CHANGE UP (ref 96–108)
CO2 SERPL-SCNC: 27 MMOL/L — SIGNIFICANT CHANGE UP (ref 22–31)
CREAT SERPL-MCNC: 1.01 MG/DL — SIGNIFICANT CHANGE UP (ref 0.5–1.3)
EGFR: 74 ML/MIN/1.73M2 — SIGNIFICANT CHANGE UP
GLUCOSE SERPL-MCNC: 97 MG/DL — SIGNIFICANT CHANGE UP (ref 70–99)
HCT VFR BLD CALC: 31.1 % — LOW (ref 39–50)
HGB BLD-MCNC: 10.2 G/DL — LOW (ref 13–17)
INR BLD: 1.57 RATIO — HIGH (ref 0.88–1.16)
MCHC RBC-ENTMCNC: 25.1 PG — LOW (ref 27–34)
MCHC RBC-ENTMCNC: 32.8 GM/DL — SIGNIFICANT CHANGE UP (ref 32–36)
MCV RBC AUTO: 76.4 FL — LOW (ref 80–100)
NRBC # BLD: 0 /100 WBCS — SIGNIFICANT CHANGE UP (ref 0–0)
PLATELET # BLD AUTO: 188 K/UL — SIGNIFICANT CHANGE UP (ref 150–400)
POTASSIUM SERPL-MCNC: 3.9 MMOL/L — SIGNIFICANT CHANGE UP (ref 3.5–5.3)
POTASSIUM SERPL-SCNC: 3.9 MMOL/L — SIGNIFICANT CHANGE UP (ref 3.5–5.3)
PROTHROM AB SERPL-ACNC: 18.8 SEC — HIGH (ref 10.5–13.4)
RBC # BLD: 4.07 M/UL — LOW (ref 4.2–5.8)
RBC # FLD: 18.2 % — HIGH (ref 10.3–14.5)
SODIUM SERPL-SCNC: 140 MMOL/L — SIGNIFICANT CHANGE UP (ref 135–145)
WBC # BLD: 5.07 K/UL — SIGNIFICANT CHANGE UP (ref 3.8–10.5)
WBC # FLD AUTO: 5.07 K/UL — SIGNIFICANT CHANGE UP (ref 3.8–10.5)

## 2022-04-23 PROCEDURE — 99233 SBSQ HOSP IP/OBS HIGH 50: CPT

## 2022-04-23 RX ADMIN — PIPERACILLIN AND TAZOBACTAM 25 GRAM(S): 4; .5 INJECTION, POWDER, LYOPHILIZED, FOR SOLUTION INTRAVENOUS at 13:31

## 2022-04-23 RX ADMIN — PANTOPRAZOLE SODIUM 40 MILLIGRAM(S): 20 TABLET, DELAYED RELEASE ORAL at 17:20

## 2022-04-23 RX ADMIN — PIPERACILLIN AND TAZOBACTAM 25 GRAM(S): 4; .5 INJECTION, POWDER, LYOPHILIZED, FOR SOLUTION INTRAVENOUS at 05:12

## 2022-04-23 RX ADMIN — LACTULOSE 10 GRAM(S): 10 SOLUTION ORAL at 17:20

## 2022-04-23 RX ADMIN — Medication 325 MILLIGRAM(S): at 11:40

## 2022-04-23 RX ADMIN — SENNA PLUS 2 TABLET(S): 8.6 TABLET ORAL at 21:30

## 2022-04-23 RX ADMIN — LACTULOSE 10 GRAM(S): 10 SOLUTION ORAL at 05:13

## 2022-04-23 RX ADMIN — PANTOPRAZOLE SODIUM 40 MILLIGRAM(S): 20 TABLET, DELAYED RELEASE ORAL at 05:13

## 2022-04-23 RX ADMIN — PIPERACILLIN AND TAZOBACTAM 25 GRAM(S): 4; .5 INJECTION, POWDER, LYOPHILIZED, FOR SOLUTION INTRAVENOUS at 21:30

## 2022-04-23 NOTE — DISCHARGE NOTE PROVIDER - DETAILS OF MALNUTRITION DIAGNOSIS/DIAGNOSES
This patient has been assessed with a concern for Malnutrition and was treated during this hospitalization for the following Nutrition diagnosis/diagnoses:     -  04/22/2022: Severe protein-calorie malnutrition

## 2022-04-23 NOTE — DISCHARGE NOTE PROVIDER - NSDCFUADDINST_GEN_ALL_CORE_FT
HOME CARE INSTRUCTIONS  f you were prescribed antibiotics, take them exactly as your caregiver instructs you. Finish the medication even if you feel better after you have only taken some of the medication.  Drink enough water and fluids to keep your urine clear or pale yellow.  Avoid caffeine, tea, and carbonated beverages. They tend to irritate your bladder.  Empty your bladder often. Avoid holding urine for long periods of time.  Empty your bladder before and after sexual intercourse.  After a bowel movement, women should cleanse from front to back. Use each tissue only once.  SEEK MEDICAL CARE IF:  You have back pain.  You develop a fever.  Your symptoms do not begin to resolve within 3 days.  SEEK IMMEDIATE MEDICAL CARE IF:  You have severe back pain or lower abdominal pain.  You develop chills.  You have nausea or vomiting.  You have continued burning or discomfort with urination.    Cirrhosis is scarring of the liver which can cause heavy bleeding, swelling, & breathing problems  Call your doctor with belly & leg swelling, shortness of breath, bruising or bleeding easily, feeling full, tired, trouble getting enough or too much sleep, yellowing of skin or whites of eye, sudden confusion, or coma  Causes may include heavy alcohol use, hepatitis B or C, or fatty liver.    You can help yourself by avoiding alcohol, speak with your doctor before starting on any new medication, herbs, vitamins, or supplements which may cause more damage to the liver  Treatment includes treating the cause, reducing blood pressure, low salt diet, diuretics, belly fluid drainage, treating infections, getting vaccinations to prevent common infections, &/or lactulose to improve confusion  It is important to get help if you have an alcohol problem, use condoms when having sex, and nor sharing drug needles if this applies to you

## 2022-04-23 NOTE — PROGRESS NOTE ADULT - ASSESSMENT
Acute Cholangitis / infected Biliary stent - most likely diagnosis  Spontaneous Bacterial Peritonitis - seems unlikely    Plan:  ·	cont Zosyn 3.375gm IV q8h  D4  ·	when ready for dc, can be switched to ceftin/ flagyl Acute Cholangitis / infected Biliary stent - most likely diagnosis  Spontaneous Bacterial Peritonitis - seems unlikely    Plan:  ·	cont Zosyn 3.375gm IV q8h  D4  ·	when ready for dc, can be switched to ceftin 500mg PO BID and flagyl 500mg PO TID both for 5-6 days

## 2022-04-23 NOTE — PROGRESS NOTE ADULT - ASSESSMENT
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 Per wife, patient follows with Dr. Cho and was scheduled to get stent exchanged but had multiple hospitalizations and wasn't  done.  Admitted for suspected Cholangitis.     Labs reviewed.     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 S/p ERCP on 4/22 (Extensive choledocholithiasis and biliary purulence consistent with cholangitis and occluded biliary stent. Stent removed and replaced with new 10Fr/7cm plastic biliary stent) C/w IV Zosyn D-4. DC plan on oral abx Ceftin and flagyl x 5 more days. Patient will need repeat ERCP in 2-3 months for stent removal and possible cholangioscopy   -S/p IR guided paracentesis on 4/21, removed 2100cc fluid, studies not consistent with SBP (though performed while being on abx)   -CT showed Chronic right rectus sheath collection/hematoma, stable in size. High density material within the collection is of unclear significance, not seen on the prior noncontrasted CT from 1/3/2022. No surgical intervention warranted. Also per discussion with IR attending, given patient's hemodynamic stability, stable H/H  it's unlikely that patient has an acute bleeding at this time. Will hold off any intervention, monitor cbc. If concerns of bleeding develops will get CT with contrast   -C/w lactulose, titrate to 2-3 soft BM /day.   -C/w Lasix and aldactone   -C/w propanolol.  -Discussed GOC with patient, resuscitative measures including CPR and intubation was discussed. Patient said that he would not want any artificial means and has lived for many yrs. However, he would like to speak with his wife before making any final decision.      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 Per wife, patient follows with Dr. Cho and was scheduled to get stent exchanged but had multiple hospitalizations and wasn't  done.  Admitted for suspected Cholangitis.     Labs reviewed.     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 S/p ERCP on 4/22 (Extensive choledocholithiasis and biliary purulence consistent with cholangitis and occluded biliary stent. Stent removed and replaced with new 10Fr/7cm plastic biliary stent) C/w IV Zosyn D-4. Source control achieved with stent removal. DC plan on oral abx Ceftin and flagyl x 5 more days. Patient will need repeat ERCP in 2-3 months for stent removal and possible cholangioscopy   -S/p IR guided paracentesis on 4/21, removed 2100cc fluid, studies not consistent with SBP (though performed while being on abx)   -CT showed Chronic right rectus sheath collection/hematoma, stable in size. High density material within the collection is of unclear significance, not seen on the prior noncontrasted CT from 1/3/2022. No surgical intervention warranted. Also per discussion with IR attending, given patient's hemodynamic stability, stable H/H  it's unlikely that patient has an acute bleeding at this time. Will hold off any intervention, H/H stable    -C/w lactulose, titrate to 2-3 soft BM /day.   -C/w Lasix and aldactone   -C/w propanolol.  -Discussed GOC with patient, resuscitative measures including CPR and intubation was discussed. Patient said that he would not want any artificial means and has lived for many yrs. However, he would like to speak with his wife before making any final decision.

## 2022-04-23 NOTE — DISCHARGE NOTE PROVIDER - NSDCCPCAREPLAN_GEN_ALL_CORE_FT
PRINCIPAL DISCHARGE DIAGNOSIS  Diagnosis: Acute UTI  Assessment and Plan of Treatment:       SECONDARY DISCHARGE DIAGNOSES  Diagnosis: Ascites  Assessment and Plan of Treatment: Status post IR guided paracentesis on 4/21, removed 2100cc fluid, studies not consistent with SBP (though performed while being on abx).        Diagnosis: Liver cirrhosis  Assessment and Plan of Treatment: Chronic; continue with Lactulose, lasix and propranolol.    Diagnosis: Abdominal wall hematoma  Assessment and Plan of Treatment: CT showed Chronic right rectus sheath collection/hematoma, stable in size. High density material within the collection is of unclear significance, not seen on the prior noncontrasted CT from 1/3/2022. No surgical intervention warranted. Also per discussion with IR attending, given patient's hemodynamic stability, stable H/H  it's unlikely that patient has an acute bleeding at this time. Does not require intervention at this time.     PRINCIPAL DISCHARGE DIAGNOSIS  Diagnosis: Acute UTI  Assessment and Plan of Treatment: HOME CARE INSTRUCTIONS  f you were prescribed antibiotics, take them exactly as your caregiver instructs you. Finish the medication even if you feel better after you have only taken some of the medication.  Drink enough water and fluids to keep your urine clear or pale yellow.  Avoid caffeine, tea, and carbonated beverages. They tend to irritate your bladder.  Empty your bladder often. Avoid holding urine for long periods of time.  Empty your bladder before and after sexual intercourse.  After a bowel movement, women should cleanse from front to back. Use each tissue only once.  SEEK MEDICAL CARE IF:  You have back pain.  You develop a fever.  Your symptoms do not begin to resolve within 3 days.  SEEK IMMEDIATE MEDICAL CARE IF:  You have severe back pain or lower abdominal pain.  You develop chills.  You have nausea or vomiting.  You have continued burning or discomfort with urination.        SECONDARY DISCHARGE DIAGNOSES  Diagnosis: Ascites  Assessment and Plan of Treatment: Status post IR guided paracentesis on 4/21, removed 2100cc fluid, studies not consistent with SBP (though performed while being on abx).        Diagnosis: Liver cirrhosis  Assessment and Plan of Treatment: Chronic; continue with Lactulose, lasix and propranolol.  Cirrhosis is scarring of the liver which can cause heavy bleeding, swelling, & breathing problems  Call your doctor with belly & leg swelling, shortness of breath, bruising or bleeding easily, feeling full, tired, trouble getting enough or too much sleep, yellowing of skin or whites of eye, sudden confusion, or coma  Causes may include heavy alcohol use, hepatitis B or C, or fatty liver.    You can help yourself by avoiding alcohol, speak with your doctor before starting on any new medication, herbs, vitamins, or supplements which may cause more damage to the liver  Treatment includes treating the cause, reducing blood pressure, low salt diet, diuretics, belly fluid drainage, treating infections, getting vaccinations to prevent common infections, &/or lactulose to improve confusion  It is important to get help if you have an alcohol problem, use condoms when having sex, and nor sharing drug needles if this applies to you      Diagnosis: Abdominal wall hematoma  Assessment and Plan of Treatment: CT showed Chronic right rectus sheath collection/hematoma, stable in size. High density material within the collection is of unclear significance, not seen on the prior noncontrasted CT from 1/3/2022. No surgical intervention warranted. Also per discussion with IR attending, given patient's hemodynamic stability, stable H/H  it's unlikely that patient has an acute bleeding at this time. Does not require intervention at this time.     PRINCIPAL DISCHARGE DIAGNOSIS  Diagnosis: Acute UTI  Assessment and Plan of Treatment: HOME CARE INSTRUCTIONS  If you were prescribed antibiotics, take them exactly as your caregiver instructs you. Finish the medication even if you feel better after you have only taken some of the medication.  Drink enough water and fluids to keep your urine clear or pale yellow.  Avoid caffeine, tea, and carbonated beverages. They tend to irritate your bladder.  Empty your bladder often. Avoid holding urine for long periods of time.  Empty your bladder before and after sexual intercourse.  After a bowel movement, women should cleanse from front to back. Use each tissue only once.  SEEK MEDICAL CARE IF:  You have back pain.  You develop a fever.  Your symptoms do not begin to resolve within 3 days.  SEEK IMMEDIATE MEDICAL CARE IF:  You have severe back pain or lower abdominal pain.  You develop chills.  You have nausea or vomiting.  You have continued burning or discomfort with urination.        SECONDARY DISCHARGE DIAGNOSES  Diagnosis: Mirizzi's syndrome  Assessment and Plan of Treatment: You will need a repeat ERCP in 2 to 3 months  for stent removal and possible cholangioscopy.. Please follow up with your Gastroeneterologist for further medical management.  Please continue taking your medication as prescribed. If you have any questions or concerns about your medication please direct them to your prescribing Healthcare Provider.  Please follow up with PCP and Gastroenterologist in 1 week.    Diagnosis: Abdominal wall hematoma  Assessment and Plan of Treatment: CT showed Chronic right rectus sheath collection/hematoma, stable in size. High density material within the collection is of unclear significance, not seen on the prior noncontrasted CT from 1/3/2022. No surgical intervention warranted. Also per discussion with IR attending, given patient's hemodynamic stability, stable H/H  it's unlikely that patient has an acute bleeding at this time. Does not require intervention at this time. Please follow up with your Primary Care Physician in 2 weeks.    Diagnosis: Ascites  Assessment and Plan of Treatment: Status post IR guided paracentesis on 4/21, removed 2100cc fluid, studies not consistent with SBP. Please continue taking your medication as prescribed. If you have any questions or concerns about your medication please direct them to your prescribing Healthcare Provider.  Please follow up with your Hepatologist and Gastroenterologist in 1 week    Diagnosis: Liver cirrhosis  Assessment and Plan of Treatment: Chronic; continue with Lactulose, lasix and propranolol.  Cirrhosis is scarring of the liver which can cause heavy bleeding, swelling, & breathing problems  Call your doctor with belly & leg swelling, shortness of breath, bruising or bleeding easily, feeling full, tired, trouble getting enough or too much sleep, yellowing of skin or whites of eye, sudden confusion, or coma  Causes may include heavy alcohol use, hepatitis B or C, or fatty liver.    You can help yourself by avoiding alcohol, speak with your doctor before starting on any new medication, herbs, vitamins, or supplements which may cause more damage to the liver  Treatment includes treating the cause, reducing blood pressure, low salt diet, diuretics, belly fluid drainage, treating infections, getting vaccinations to prevent common infections, &/or lactulose to improve confusion  It is important to get help if you have an alcohol problem, use condoms when having sex, and nor sharing drug needles if this applies to you      Diagnosis: Ambulatory dysfunction  Assessment and Plan of Treatment: You are being recommended [facility]  physical therapy. Please continue to follow the directions of the Physical Therapist.    Diagnosis: 2019 novel coronavirus disease (COVID-19)  Assessment and Plan of Treatment: You were infected with COVID earlier this year, and have been vaccinated in February 2021 with Pfizer x2 dose. Please follow recommendations from Primary Care Physician when to get the COVID booster vaccine. Your COVID PCR is positive, howevere you do not have any COVID symtoms.  If you have any questions please follow up with your Primary Care Physician.

## 2022-04-23 NOTE — DISCHARGE NOTE PROVIDER - CARE PROVIDERS DIRECT ADDRESSES
[email or web address]

## 2022-04-23 NOTE — DISCHARGE NOTE PROVIDER - PROVIDER TOKENS
PROVIDER:[TOKEN:[[id number]]] PROVIDER:[TOKEN:[[id number]]],PROVIDER:[TOKEN:[[id number]]]

## 2022-04-23 NOTE — DISCHARGE NOTE PROVIDER - HOSPITAL COURSE
I have made amendments to the documentation where necessary. 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 Per wife, patient follows with Dr. Cho and was scheduled to get stent exchanged but had multiple hospitalizations and wasn't  done.  Admitted for suspected Cholangitis.         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 Also found with UTI on IV Zosyn. Patient admitted to medicine for ascites secondary to cirrhosis. Patient received paracentesis and removed 2.1liter. Patient underwent ERCP on 4/22: Extensive choledocholithiasis and biliary purulence consistent with cholangitis and occluded biliary stent. Stent removed and replaced with new 10Fr/7cm plastic biliary stent. Cholangioscopy not performed given active cholangitis. Patient considered poor surgical candidate for cholecystectomy. Will need repeat ERCP in 2-3 months for stent removal and possible cholangioscopy.    Please note that this a brief summary of hospital course please refer to daily progress notes and consult notes for full course and events. Patient seen and examined at bedside, discussed with medical attending. Patient medically cleared for discharge to [facility] with GI follow up.

## 2022-04-23 NOTE — DISCHARGE NOTE PROVIDER - ATTENDING DISCHARGE PHYSICAL EXAMINATION:
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 Stent removed and replaced with new 10Fr/7cm plastic biliary stent) . Continued on zosyn can switch to oral abx on discharge.  Patient will need repeat ERCP in 2-3 months for stent removal and possible cholangioscopy.         On exam, patient is NAD, no abdominal tenderness, no cyanosis or edema

## 2022-04-23 NOTE — DISCHARGE NOTE PROVIDER - NSDCMRMEDTOKEN_GEN_ALL_CORE_FT
acetaminophen 325 mg oral tablet: 2 tab(s) orally every 6 hours, As needed, Mild Pain (1 - 3), Moderate Pain (4 - 6), Severe Pain (7 - 10)  cyanocobalamin 1000 mcg oral tablet: 1 tab(s) orally once a day  furosemide 20 mg oral tablet: 1 tab(s) orally once a day  lactulose 10 g/15 mL oral syrup: 15 milliliter(s) orally 2 times a day -3 BMs daily  Multiple Vitamins oral tablet: 1 tab(s) orally once a day  nystatin 100,000 units/g topical powder: 1 application topically 3 times a day  nystatin 100,000 units/g topical powder (obsolete): Apply topically to affected area 3 times a day  pantoprazole 40 mg oral delayed release tablet: 1 tab(s) orally once a day (before a meal)  propranolol 10 mg oral tablet: 1 tab(s) orally once a day  thiamine 100 mg oral tablet: 1 tab(s) orally once a day   cefuroxime 500 mg oral tablet: 1 tab(s) orally 2 times a day   cyanocobalamin 1000 mcg oral tablet: 1 tab(s) orally once a day  ferrous sulfate 325 mg (65 mg elemental iron) oral tablet: 1 tab(s) orally once a day  furosemide 20 mg oral tablet: 1 tab(s) orally once a day  lactulose 10 g/15 mL oral syrup: 15 milliliter(s) orally 2 times a day  metroNIDAZOLE 500 mg oral tablet: 1 tab(s) orally every 8 hours   Multiple Vitamins oral tablet: 1 tab(s) orally once a day  pantoprazole 40 mg oral delayed release tablet: 1 tab(s) orally once a day (before a meal)  propranolol 10 mg oral tablet: 1 tab(s) orally once a day  senna oral tablet: 2 tab(s) orally once a day (at bedtime)  spironolactone 25 mg oral tablet: 2 tab(s) orally once a day  thiamine 100 mg oral tablet: 1 tab(s) orally once a day

## 2022-04-23 NOTE — DISCHARGE NOTE PROVIDER - CARE PROVIDER_API CALL
Neftaly Mckeon (MD)  Gastroenterology; Internal Medicine  [address] [place] [id number]  Phone: [phone]  Fax: [phone]  Follow Up Time:    Neftaly Mckeon)  Gastroenterology; Internal Medicine  [address] [facility], [address]  [place] [address]  Phone: [phone]  Fax: [phone]  Follow Up Time:     Dell Rinaldi)  Gastroenterology; Internal Medicine  [address] [facility] [place] [address] [place] [address]  Phone: [phone]  Fax: [phone]  Follow Up Time:

## 2022-04-23 NOTE — PROGRESS NOTE ADULT - SUBJECTIVE AND OBJECTIVE BOX
Patient is a 82y old  Male who presents with a chief complaint of Suspected GI bleed (23 Apr 2022 14:40)      INTERVAL HPI/OVERNIGHT EVENTS: Patient is s/p ERCP on 4/22. Tolerating diet, denies any new complaints     MEDICATIONS  (STANDING):  ferrous    sulfate 325 milliGRAM(s) Oral daily  furosemide    Tablet 20 milliGRAM(s) Oral daily  lactulose Syrup 10 Gram(s) Oral two times a day  pantoprazole  Injectable 40 milliGRAM(s) IV Push every 12 hours  piperacillin/tazobactam IVPB.. 3.375 Gram(s) IV Intermittent every 8 hours  propranolol 10 milliGRAM(s) Oral daily  senna 2 Tablet(s) Oral at bedtime  spironolactone 50 milliGRAM(s) Oral daily    MEDICATIONS  (PRN):      __________________________________________________  REVIEW OF SYSTEMS:    CONSTITUTIONAL: No fever,   EYES: no acute visual disturbances  NECK: No pain or stiffness  RESPIRATORY: No cough; No shortness of breath  CARDIOVASCULAR: No chest pain, no palpitations  GASTROINTESTINAL: No pain. No nausea or vomiting; No diarrhea   NEUROLOGICAL: No headache or numbness, no tremors  MUSCULOSKELETAL: No joint pain, no muscle pain  GENITOURINARY: no dysuria, no frequency, no hesitancy  PSYCHIATRY: no depression , no anxiety  ALL OTHER  ROS negative        Vital Signs Last 24 Hrs  T(C): 36.3 (23 Apr 2022 13:07), Max: 36.9 (23 Apr 2022 05:21)  T(F): 97.4 (23 Apr 2022 13:07), Max: 98.4 (23 Apr 2022 05:21)  HR: 79 (23 Apr 2022 13:07) (66 - 79)  BP: 108/68 (23 Apr 2022 13:07) (106/69 - 134/67)  BP(mean): 84 (22 Apr 2022 20:56) (74 - 88)  RR: 17 (23 Apr 2022 13:07) (16 - 21)  SpO2: 98% (23 Apr 2022 13:07) (98% - 100%)    ________________________________________________  PHYSICAL EXAM:  GENERAL: NAD  HEENT: Normocephalic;  conjunctivae and sclerae clear; moist mucous membranes;   NECK : supple  CHEST/LUNG: Clear to auscultation bilaterally with good air entry   HEART: S1 S2  regular; no murmurs, gallops or rubs  ABDOMEN: Soft, RUQ tenderness+,  Nondistended; Bowel sounds present  EXTREMITIES: no cyanosis; no edema; no calf tenderness  SKIN: Unsatgeable Pressure Injury to the R. Achillis with stable eschar and no drainage, stage 1 sacral ulcer   NERVOUS SYSTEM:  Awake and alert; Oriented  to place, person and time ; no new deficits    _________________________________________________  LABS:                        10.2   5.07  )-----------( 188      ( 23 Apr 2022 08:16 )             31.1     04-23    140  |  107  |  8   ----------------------------<  97  3.9   |  27  |  1.01    Ca    8.6      23 Apr 2022 08:16    TPro  5.6<L>  /  Alb  1.8<L>  /  TBili  0.5  /  DBili  x   /  AST  66<H>  /  ALT  28  /  AlkPhos  478<H>  04-22    PT/INR - ( 23 Apr 2022 08:16 )   PT: 18.8 sec;   INR: 1.57 ratio         PTT - ( 23 Apr 2022 08:16 )  PTT:31.7 sec    CAPILLARY BLOOD GLUCOSE            RADIOLOGY & ADDITIONAL TESTS:    Imaging Personally Reviewed:  YES    Consultant(s) Notes Reviewed:   YES    Care Discussed with Consultants : YES     Plan of care was discussed with patient and /or primary care giver; all questions and concerns were addressed and care was aligned with patient's wishes.

## 2022-04-24 LAB
ANION GAP SERPL CALC-SCNC: 7 MMOL/L — SIGNIFICANT CHANGE UP (ref 5–17)
APTT BLD: 32.1 SEC — SIGNIFICANT CHANGE UP (ref 27.5–35.5)
BUN SERPL-MCNC: 10 MG/DL — SIGNIFICANT CHANGE UP (ref 7–18)
CALCIUM SERPL-MCNC: 8.6 MG/DL — SIGNIFICANT CHANGE UP (ref 8.4–10.5)
CHLORIDE SERPL-SCNC: 107 MMOL/L — SIGNIFICANT CHANGE UP (ref 96–108)
CO2 SERPL-SCNC: 25 MMOL/L — SIGNIFICANT CHANGE UP (ref 22–31)
CREAT SERPL-MCNC: 1.22 MG/DL — SIGNIFICANT CHANGE UP (ref 0.5–1.3)
EGFR: 59 ML/MIN/1.73M2 — LOW
GLUCOSE SERPL-MCNC: 100 MG/DL — HIGH (ref 70–99)
HCT VFR BLD CALC: 30.8 % — LOW (ref 39–50)
HGB BLD-MCNC: 9.9 G/DL — LOW (ref 13–17)
INR BLD: 1.55 RATIO — HIGH (ref 0.88–1.16)
MCHC RBC-ENTMCNC: 24.6 PG — LOW (ref 27–34)
MCHC RBC-ENTMCNC: 32.1 GM/DL — SIGNIFICANT CHANGE UP (ref 32–36)
MCV RBC AUTO: 76.4 FL — LOW (ref 80–100)
NRBC # BLD: 0 /100 WBCS — SIGNIFICANT CHANGE UP (ref 0–0)
PLATELET # BLD AUTO: 188 K/UL — SIGNIFICANT CHANGE UP (ref 150–400)
POTASSIUM SERPL-MCNC: 3.9 MMOL/L — SIGNIFICANT CHANGE UP (ref 3.5–5.3)
POTASSIUM SERPL-SCNC: 3.9 MMOL/L — SIGNIFICANT CHANGE UP (ref 3.5–5.3)
PROTHROM AB SERPL-ACNC: 18.5 SEC — HIGH (ref 10.5–13.4)
RBC # BLD: 4.03 M/UL — LOW (ref 4.2–5.8)
RBC # FLD: 18.5 % — HIGH (ref 10.3–14.5)
SODIUM SERPL-SCNC: 139 MMOL/L — SIGNIFICANT CHANGE UP (ref 135–145)
WBC # BLD: 5.15 K/UL — SIGNIFICANT CHANGE UP (ref 3.8–10.5)
WBC # FLD AUTO: 5.15 K/UL — SIGNIFICANT CHANGE UP (ref 3.8–10.5)

## 2022-04-24 PROCEDURE — 99232 SBSQ HOSP IP/OBS MODERATE 35: CPT

## 2022-04-24 RX ORDER — ENOXAPARIN SODIUM 100 MG/ML
40 INJECTION SUBCUTANEOUS EVERY 24 HOURS
Refills: 0 | Status: DISCONTINUED | OUTPATIENT
Start: 2022-04-24 | End: 2022-04-26

## 2022-04-24 RX ADMIN — PANTOPRAZOLE SODIUM 40 MILLIGRAM(S): 20 TABLET, DELAYED RELEASE ORAL at 05:43

## 2022-04-24 RX ADMIN — PIPERACILLIN AND TAZOBACTAM 25 GRAM(S): 4; .5 INJECTION, POWDER, LYOPHILIZED, FOR SOLUTION INTRAVENOUS at 21:08

## 2022-04-24 RX ADMIN — SENNA PLUS 2 TABLET(S): 8.6 TABLET ORAL at 21:09

## 2022-04-24 RX ADMIN — PIPERACILLIN AND TAZOBACTAM 25 GRAM(S): 4; .5 INJECTION, POWDER, LYOPHILIZED, FOR SOLUTION INTRAVENOUS at 13:08

## 2022-04-24 RX ADMIN — PIPERACILLIN AND TAZOBACTAM 25 GRAM(S): 4; .5 INJECTION, POWDER, LYOPHILIZED, FOR SOLUTION INTRAVENOUS at 05:41

## 2022-04-24 RX ADMIN — ENOXAPARIN SODIUM 40 MILLIGRAM(S): 100 INJECTION SUBCUTANEOUS at 12:22

## 2022-04-24 RX ADMIN — Medication 325 MILLIGRAM(S): at 12:22

## 2022-04-24 RX ADMIN — PANTOPRAZOLE SODIUM 40 MILLIGRAM(S): 20 TABLET, DELAYED RELEASE ORAL at 18:00

## 2022-04-24 RX ADMIN — LACTULOSE 10 GRAM(S): 10 SOLUTION ORAL at 18:00

## 2022-04-24 RX ADMIN — LACTULOSE 10 GRAM(S): 10 SOLUTION ORAL at 05:42

## 2022-04-24 NOTE — PROGRESS NOTE ADULT - SUBJECTIVE AND OBJECTIVE BOX
82y Male is under our care for acute cholangitis / infected biliary stent (s/p ERCP on 4/22). Patient is doing slightly better but c/o fatigue. PT evaluation was done and [facility] was recommended. Remains afebrile with normal wbc count.     MEDS:  piperacillin/tazobactam IVPB.. 3.375 Gram(s) IV Intermittent every 8 hours    ALLERGIES: Allergies    No Known Allergies    Intolerances    REVIEW OF SYSTEMS:  [  ] Not able to illicit  General: no fevers no malaise +fatigue  Chest: no cough no sob  GI: no nvd   : no urinary sxs   Skin: no rashes  Musculoskeletal: no trauma no LBP  Neuro: no ha's no dizziness     VITALS:  Vital Signs Last 24 Hrs  T(C): 36.5 (04-24-22 @ 13:09), Max: 37 (04-24-22 @ 05:10)  T(F): 97.7 (04-24-22 @ 13:09), Max: 98.6 (04-24-22 @ 05:10)  HR: 87 (04-24-22 @ 13:09) (76 - 116)  BP: 124/78 (04-24-22 @ 13:09) (92/66 - 128/63)  BP(mean): --  RR: 18 (04-24-22 @ 13:09) (17 - 18)  SpO2: 100% (04-24-22 @ 13:09) (98% - 100%)    PHYSICAL EXAM:  HEENT: n/a  Neck: supple no LN's   Respiratory: lungs clear no rales  Cardiovascular: S1 S2 tachy +loud sys murmur  Gastrointestinal: +BS with soft, nondistended abdomen; nontender  Extremities: no edema  Skin: no rashes  Ortho: n/a  Neuro: awake and alert      LABS/DIAGNOSTIC TESTS:                           9.9    5.15  )-----------( 188      ( 24 Apr 2022 06:08 )             30.8   WBC Count: 5.15 K/uL (04-24 @ 06:08)  WBC Count: 5.07 K/uL (04-23 @ 08:16)  WBC Count: 5.11 K/uL (04-22 @ 07:01)  WBC Count: 4.30 K/uL (04-21 @ 07:22)  WBC Count: 4.58 K/uL (04-20 @ 12:45)    04-24    139  |  107  |  10  ----------------------------<  100<H>  3.9   |  25  |  1.22    Ca    8.6      24 Apr 2022 06:08      CULTURES:   .Body Fluid Peritoneal Fluid  04-21 @ 20:47   No growth  --    polymorphonuclear leukocytes seen  No organisms seen  by cytocentrifuge      .Blood Blood-Peripheral  04-20 @ 18:23   No growth to date.  --  --      Clean Catch Clean Catch (Midstream)  01-30 @ 02:08   >100,000 CFU/ml Escherichia coli ESBL  --  Escherichia coli ESBL        RADIOLOGY:  no new studies

## 2022-04-24 NOTE — PROGRESS NOTE ADULT - ASSESSMENT
Acute Cholangitis / infected Biliary stent - most likely diagnosis  Spontaneous Bacterial Peritonitis - seems unlikely    Plan:  ·	awaiting arrangements for [facility]  ·	cont Zosyn 3.375gm IV q8h  D5  ·	when ready for dc, can be switched to ceftin 500mg PO BID and flagyl 500mg PO TID both for 5 days

## 2022-04-24 NOTE — PHYSICAL THERAPY INITIAL EVALUATION ADULT - DIAGNOSIS, PT EVAL
Patient presented with generalized weakness, weakness on B LE'S and unsafe with ambulation at this time

## 2022-04-24 NOTE — PROGRESS NOTE ADULT - ASSESSMENT
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 Per wife, patient follows with Dr. Cho and was scheduled to get stent exchanged but had multiple hospitalizations and wasn't  done.  Admitted for suspected Cholangitis.         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 S/p ERCP on 4/22 (Extensive choledocholithiasis and biliary purulence consistent with cholangitis and occluded biliary stent. Stent removed and replaced with new 10Fr/7cm plastic biliary stent) C/w IV Zosyn D-4. Source control achieved with stent removal. C/w IV Zosyn, can switch to oral upon discharge. Patient will need repeat ERCP in 2-3 months for stent removal and possible cholangioscopy   -S/p IR guided paracentesis on 4/21, removed 2100cc fluid, studies not consistent with SBP (though performed while being on abx)   -CT showed Chronic right rectus sheath collection/hematoma, stable in size. High density material within the collection is of unclear significance, not seen on the prior noncontrasted CT from 1/3/2022. No surgical intervention warranted. Also per discussion with IR attending, given patient's hemodynamic stability, stable H/H  it's unlikely that patient has an acute bleeding at this time. Will hold off any intervention, H/H stable    -C/w lactulose, titrate to 2-3 soft BM /day.   -C/w Lasix and aldactone   -C/w propanolol.  -Discussed GOC with patient and wife, FULL CODE for now.   -DC Plan: Patient is medically stable to get discharged PT recommended [facility]SON. Family in agreement and opted for [facility]. 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 Per wife, patient follows with Dr. Cho and was scheduled to get stent exchanged but had multiple hospitalizations and wasn't  done.  Admitted for suspected Cholangitis.         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 S/p ERCP on 4/22 (Extensive choledocholithiasis and biliary purulence consistent with cholangitis and occluded biliary stent. Stent removed and replaced with new 10Fr/7cm plastic biliary stent) C/w IV Zosyn D-5. Source control achieved with stent removal, can switch to oral upon discharge. Patient will need repeat ERCP in 2-3 months for stent removal and possible cholangioscopy   -S/p IR guided paracentesis on 4/21, removed 2100cc fluid, studies not consistent with SBP (though performed while being on abx)   -CT showed Chronic right rectus sheath collection/hematoma, stable in size. High density material within the collection is of unclear significance, not seen on the prior noncontrasted CT from 1/3/2022. No surgical intervention warranted. Also per discussion with IR attending, given patient's hemodynamic stability, stable H/H  it's unlikely that patient has an acute bleeding at this time. Will hold off any intervention, H/H stable    -C/w lactulose, titrate to 2-3 soft BM /day.   -C/w Lasix and aldactone   -C/w propanolol.  -Discussed GOC with patient and wife, FULL CODE for now.   -DC Plan: Patient is medically stable to get discharged PT recommended [facility]. Family in agreement and opted for [facility]. Needs CM f/u

## 2022-04-24 NOTE — CHART NOTE - NSCHARTNOTEFT_GEN_A_CORE
Pt. and spouse endorse pt. is in need for ROBINSON as he is too weak now to be discharged to home. PT virgen appreciated, recc [facility].

## 2022-04-24 NOTE — PROGRESS NOTE ADULT - SUBJECTIVE AND OBJECTIVE BOX
Patient is a 82y old  Male who presents with a chief complaint of Suspected GI bleed (24 Apr 2022 12:30)      INTERVAL HPI/OVERNIGHT EVENTS: no events noted overnight. Pt feels well, discussed possibility of getting discharged today. He says that he feels weak and would prefer [facility]     MEDICATIONS  (STANDING):  enoxaparin Injectable 40 milliGRAM(s) SubCutaneous every 24 hours  ferrous    sulfate 325 milliGRAM(s) Oral daily  furosemide    Tablet 20 milliGRAM(s) Oral daily  lactulose Syrup 10 Gram(s) Oral two times a day  pantoprazole  Injectable 40 milliGRAM(s) IV Push every 12 hours  piperacillin/tazobactam IVPB.. 3.375 Gram(s) IV Intermittent every 8 hours  propranolol 10 milliGRAM(s) Oral daily  senna 2 Tablet(s) Oral at bedtime  spironolactone 50 milliGRAM(s) Oral daily    MEDICATIONS  (PRN):      __________________________________________________  REVIEW OF SYSTEMS:    CONSTITUTIONAL: No fever,   EYES: no acute visual disturbances  NECK: No pain or stiffness  RESPIRATORY: No cough; No shortness of breath  CARDIOVASCULAR: No chest pain, no palpitations  GASTROINTESTINAL: No pain. No nausea or vomiting; No diarrhea   NEUROLOGICAL: No headache or numbness, no tremors  MUSCULOSKELETAL: No joint pain, no muscle pain  GENITOURINARY: no dysuria, no frequency, no hesitancy  PSYCHIATRY: no depression , no anxiety  ALL OTHER  ROS negative        Vital Signs Last 24 Hrs  T(C): 36.5 (24 Apr 2022 13:09), Max: 37 (24 Apr 2022 05:10)  T(F): 97.7 (24 Apr 2022 13:09), Max: 98.6 (24 Apr 2022 05:10)  HR: 87 (24 Apr 2022 13:09) (76 - 116)  BP: 124/78 (24 Apr 2022 13:09) (92/66 - 128/63)  BP(mean): --  RR: 18 (24 Apr 2022 13:09) (17 - 18)  SpO2: 100% (24 Apr 2022 13:09) (98% - 100%)    ________________________________________________  PHYSICAL EXAM:  GENERAL: NAD  HEENT: Normocephalic;  conjunctivae and sclerae clear; moist mucous membranes;   NECK : supple  CHEST/LUNG: Clear to auscultation bilaterally with good air entry   HEART: S1 S2  regular; no murmurs, gallops or rubs  ABDOMEN: Soft, mild RUQ tenderness still present, Nondistended; Bowel sounds present  EXTREMITIES: no cyanosis; no edema; no calf tenderness  SKIN: SKIN: Un- stageable Pressure Injury to the R. Achillis with stable eschar and no drainage, stage 1 sacral ulcer   NERVOUS SYSTEM:  Awake and alert; Oriented  to place, person and time ; no new deficits    _________________________________________________  LABS:                        9.9    5.15  )-----------( 188      ( 24 Apr 2022 06:08 )             30.8     04-24    139  |  107  |  10  ----------------------------<  100<H>  3.9   |  25  |  1.22    Ca    8.6      24 Apr 2022 06:08      PT/INR - ( 24 Apr 2022 06:08 )   PT: 18.5 sec;   INR: 1.55 ratio         PTT - ( 24 Apr 2022 06:08 )  PTT:32.1 sec    CAPILLARY BLOOD GLUCOSE            RADIOLOGY & ADDITIONAL TESTS:    Imaging Personally Reviewed:  YES    Consultant(s) Notes Reviewed:   YES    Care Discussed with Consultants : YES     Plan of care was discussed with patient and /or primary care giver; all questions and concerns were addressed and care was aligned with patient's wishes.

## 2022-04-25 DIAGNOSIS — Z29.9 ENCOUNTER FOR PROPHYLACTIC MEASURES, UNSPECIFIED: ICD-10-CM

## 2022-04-25 DIAGNOSIS — Z02.9 ENCOUNTER FOR ADMINISTRATIVE EXAMINATIONS, UNSPECIFIED: ICD-10-CM

## 2022-04-25 LAB
CULTURE RESULTS: SIGNIFICANT CHANGE UP
CULTURE RESULTS: SIGNIFICANT CHANGE UP
GLUCOSE BLDC GLUCOMTR-MCNC: 112 MG/DL — HIGH (ref 70–99)
GLUCOSE BLDC GLUCOMTR-MCNC: 124 MG/DL — HIGH (ref 70–99)
GLUCOSE BLDC GLUCOMTR-MCNC: 127 MG/DL — HIGH (ref 70–99)
GLUCOSE BLDC GLUCOMTR-MCNC: 94 MG/DL — SIGNIFICANT CHANGE UP (ref 70–99)
NON-GYNECOLOGICAL CYTOLOGY STUDY: SIGNIFICANT CHANGE UP
SPECIMEN SOURCE: SIGNIFICANT CHANGE UP
SPECIMEN SOURCE: SIGNIFICANT CHANGE UP

## 2022-04-25 PROCEDURE — 99232 SBSQ HOSP IP/OBS MODERATE 35: CPT

## 2022-04-25 RX ADMIN — SENNA PLUS 2 TABLET(S): 8.6 TABLET ORAL at 21:30

## 2022-04-25 RX ADMIN — Medication 20 MILLIGRAM(S): at 05:41

## 2022-04-25 RX ADMIN — PIPERACILLIN AND TAZOBACTAM 25 GRAM(S): 4; .5 INJECTION, POWDER, LYOPHILIZED, FOR SOLUTION INTRAVENOUS at 05:40

## 2022-04-25 RX ADMIN — PIPERACILLIN AND TAZOBACTAM 25 GRAM(S): 4; .5 INJECTION, POWDER, LYOPHILIZED, FOR SOLUTION INTRAVENOUS at 21:29

## 2022-04-25 RX ADMIN — Medication 325 MILLIGRAM(S): at 11:38

## 2022-04-25 RX ADMIN — PIPERACILLIN AND TAZOBACTAM 25 GRAM(S): 4; .5 INJECTION, POWDER, LYOPHILIZED, FOR SOLUTION INTRAVENOUS at 15:19

## 2022-04-25 RX ADMIN — LACTULOSE 10 GRAM(S): 10 SOLUTION ORAL at 17:40

## 2022-04-25 RX ADMIN — LACTULOSE 10 GRAM(S): 10 SOLUTION ORAL at 05:42

## 2022-04-25 RX ADMIN — PANTOPRAZOLE SODIUM 40 MILLIGRAM(S): 20 TABLET, DELAYED RELEASE ORAL at 05:44

## 2022-04-25 RX ADMIN — SPIRONOLACTONE 50 MILLIGRAM(S): 25 TABLET, FILM COATED ORAL at 05:42

## 2022-04-25 RX ADMIN — ENOXAPARIN SODIUM 40 MILLIGRAM(S): 100 INJECTION SUBCUTANEOUS at 11:39

## 2022-04-25 RX ADMIN — PANTOPRAZOLE SODIUM 40 MILLIGRAM(S): 20 TABLET, DELAYED RELEASE ORAL at 17:41

## 2022-04-25 NOTE — PROGRESS NOTE ADULT - PROBLEM SELECTOR PLAN 2
- History of Mirizzi's s/p biliary stenting  - considered poor surgical candidate for cholecystectomy  - old retained stent (placed 6/2021, lost to f/u), underwent EHL and biliary stent placement in 6/2021  - s/p ERCP on 4/22: Extensive choledocholithiasis and biliary purulence consistent with cholangitis and occluded biliary stent. Stent removed and replaced with new 10Fr/7cm plastic biliary stent. Cholangioscopy not performed given active cholangitis  - on Zosyn, no leukocytosis   - advance diet as tolerated  - Will need repeat ERCP in 2-3 months for stent removal and possible cholangioscopy

## 2022-04-25 NOTE — PROGRESS NOTE ADULT - PROBLEM SELECTOR PLAN 1
- CT A/P: biliary stent in place with biliary dilation and known large cystic duct stone along with moderate ascites (per ID possible cholangitis given signs of biliary obstruction on CT)  - s/p diagnostic paracentesis r/o SBP given pain and ascites on 4/21 (studies not consistent with SBP) - CT A/P: biliary stent in place with biliary dilation and known large cystic duct stone along with moderate ascites  - s/p diagnostic paracentesis r/o SBP given pain and ascites on 4/21 (studies not consistent with SBP)

## 2022-04-25 NOTE — PROGRESS NOTE ADULT - PROBLEM SELECTOR PLAN 8
PT recommending [facility]  COVID 4/19 negative- resend when authorization in progress for [facility]  pending choices

## 2022-04-25 NOTE — PROGRESS NOTE ADULT - ASSESSMENT
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 Also found with UTI on IV Zosyn day 3/7. Patient admitted to medicine for ascites secondary to cirrhosis. Patient received paracentesis and removed 2.1liter. Patient underwent ERCP on 4/22: Extensive choledocholithiasis and biliary purulence consistent with cholangitis and occluded biliary stent. Stent removed and replaced with new 10Fr/7cm plastic biliary stent. Cholangioscopy not performed given active cholangitis. Patient considered poor surgical candidate for cholecystectomy. Will need repeat ERCP in 2-3 months for stent removal and possible cholangioscopy.

## 2022-04-25 NOTE — PROGRESS NOTE ADULT - ASSESSMENT
Acute Cholangitis / infected Biliary stent - most likely diagnosis  Spontaneous Bacterial Peritonitis - seems unlikely    Plan:  ·	awaiting arrangements for [facility]  ·	cont Zosyn 3.375gm IV q8h  D6  ·	when ready for dc, can be switched to ceftin 500mg PO BID and flagyl 500mg PO TID both for 5 days Acute Cholangitis / infected Biliary stent - most likely diagnosis  Spontaneous Bacterial Peritonitis - seems unlikely    Plan:  ·	awaiting arrangements for [facility]  ·	cont Zosyn 3.375gm IV q8h  D6  ·	when ready for dc, can be switched to ceftin 500mg PO BID and flagyl 500mg PO TID both for 4 days more.

## 2022-04-25 NOTE — PROGRESS NOTE ADULT - PROBLEM SELECTOR PLAN 2
c/w lactulose, Lasix and propranolol   4/22  MELD Score without Dialysis: 11 points   daily pt/inr History of Mirizzi's s/p biliary stenting  considered poor surgical candidate for cholecystectomy  old retained stent (placed 6/2021, lost to f/u), underwent EHL and biliary stent placement in 6/2021  s/p ERCP on 4/22: Extensive choledocholithiasis and biliary purulence consistent with cholangitis and occluded biliary stent. Stent removed and replaced with new 10Fr/7cm plastic biliary stent. Cholangioscopy not performed given active cholangitis  advance diet as tolerated  continue Zosyn 3.375gm IV q8h    when ready for dc, can be switched to ceftin 500mg PO BID and flagyl 500mg PO TID both for 5 days  Will need repeat ERCP in 2-3 months for stent removal and possible cholangioscopy.  VASILE Rinaldi

## 2022-04-25 NOTE — PROGRESS NOTE ADULT - SUBJECTIVE AND OBJECTIVE BOX
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 Pt is AxO 2 on assessment , not a detailed historian. Pt stated that he has been noticing that for past few days his abdomen is getting bigger and tighter associate with generalized abdominal pain which is mild in intensity. Pt further stated that his wife noticing black-brown stool for past one month. Pt denied fever, nausea , vomiting, diarrhea, hematemesis, headaches, urinary difficulty. Pt doesn't remember whether he has paracentesis or not in the past. Called patient wife multiple time and daughter for collateral hx but no answer.     Of note: pt admitted at [facility] in Jan & Feb for GI bleed and UTI/COVID respectively. For GI bleed pt H/H was stable in the hospital and no bleeding noted in the hospital and no GI intervention was performed. Per prior records last colonoscopy 4/2020 -showing rectal varices, vascular ectasia in ascending in descending colon s/p ablation. Endoscopy 4/20 > The esophagus contained trace varices.                          ED Course: Hgb 11 (at his baseline) , no episodes of melena or hematemesis ,  UA neg. CT A/P showed Extrahepatic common duct is enlarged near the gallbladder neck, with internal 2.3 cm masslike density which could reflect a large stone. Common bile duct stent traverses this region. Mild intrahepatic biliary distention with bile duct enhancement. There is mural thickening at the gallbladder neck as well. Infectious and/or neoplastic etiologies for these findings can be considered. Correlate for any signs and symptoms of cholangitis/sepsis. Sequelae of chronic   cholecystitis with fistula to the common duct can be considered as well, as described on prior MRI from 2/8/2021. Cirrhotic liver morphology. Moderate ascites. .Chronic right rectus sheath collection/hematoma, stable in size. High density material within the collection is of unclear significance, not seen on the prior noncontrasted CT from 1/3/2022 Correlate with hemodynamics and hematocrit of the patient. High density material could reflect a prominent tortuous vessel versus other blood products. In the setting of prior intervention, calcified and/or other embolic material can be considered.    GI HPI:  Pt underwent ERCP on 4/22. Denies abdominal pain, nausea or vomiting. Reports last bowel movement yesterday.             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    Liver cirrhosis  alcoholic/WALL cirrhosis c/b variceal bleed s/p banding (8/2018) and hepatic encephalopathy (several years ago)    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)    Pancreatitis  2/2 choledocholithiasis s/p ERCP with stone extraction and plastic stent placement (11/2019, stent now removed)    Hematochezia  2/2 colonic AVMs s/p cauterization and hemorrhoids (11/2019)    Melena  2/2 duodenal ulcers s/p argon plasma coagulation (4/2020)    Chronic kidney disease (CKD)    H/O inguinal hernia repair    History of repair of hip fracture  R hip    History of hip replacement  10/2020      ______________________________________________________________________  MEDS:  MEDICATIONS  (STANDING):  enoxaparin Injectable 40 milliGRAM(s) SubCutaneous every 24 hours  ferrous    sulfate 325 milliGRAM(s) Oral daily  furosemide    Tablet 20 milliGRAM(s) Oral daily  lactulose Syrup 10 Gram(s) Oral two times a day  pantoprazole  Injectable 40 milliGRAM(s) IV Push every 12 hours  piperacillin/tazobactam IVPB.. 3.375 Gram(s) IV Intermittent every 8 hours  propranolol 10 milliGRAM(s) Oral daily  senna 2 Tablet(s) Oral at bedtime  spironolactone 50 milliGRAM(s) Oral daily    MEDICATIONS  (PRN):    ______________________________________________________________________  ALL:   Allergies    No Known Allergies    Intolerances      ______________________________________________________________________  SH: Social History:  former heavy drinker, denies smoking and drug abuse (20 Apr 2022 06:04)    ______________________________________________________________________  FH:  FAMILY HISTORY:  Family history of ovarian cancer (Sibling)      ______________________________________________________________________  ROS:    CONSTITUTIONAL:  No weight loss, fever, chills, weakness or fatigue.    SKIN:  No rash or itching.    CARDIOVASCULAR:  No chest pain, chest pressure or chest discomfort. No palpitations or edema.    RESPIRATORY:  No shortness of breath, cough or sputum.    GASTROINTESTINAL:  SEE HPI    GENITOURINARY:  No dysuria, hematuria, urinary frequency    NEUROLOGICAL:  No headache, dizziness    MUSCULOSKELETAL:  No muscle, back pain, joint pain or stiffness.  ______________________________________________________________________  PHYSICAL EXAM:  T(C): 36.6 (04-25-22 @ 14:42), Max: 36.8 (04-24-22 @ 20:36)  HR: 68 (04-25-22 @ 14:42)  BP: 108/62 (04-25-22 @ 14:42)  RR: 18 (04-25-22 @ 14:42)  SpO2: 99% (04-25-22 @ 14:42)  Wt(kg): --      GEN: NAD   CVS- S1 S2  ABD: soft, nontender, + distended, bowel sounds+  NEURO: noin focal neuro exam; AAO x 2-3  Extremities: no cyanosis, no calf tenderness, no edema, no clubbing        ______________________________________________________________________  LABS:                        9.9    5.15  )-----------( 188      ( 24 Apr 2022 06:08 )             30.8     04-24    139  |  107  |  10  ----------------------------<  100<H>  3.9   |  25  |  1.22    Ca    8.6      24 Apr 2022 06:08

## 2022-04-25 NOTE — PROGRESS NOTE ADULT - NUTRITIONAL ASSESSMENT
This patient has been assessed with a concern for Malnutrition and has been determined to have a diagnosis/diagnoses of Severe protein-calorie malnutrition.    This patient is being managed with:   Diet Soft and Bite Sized-  Halal  No Pork  Entered: Apr 22 2022  5:54PM    

## 2022-04-25 NOTE — PROGRESS NOTE ADULT - NS ATTEND BILL GEN_ALL_CORE
Attending to bill
PAST MEDICAL HISTORY:  Bilateral ovarian cysts     Deviated septum     Frequent UTI     Hemorrhoids     History of kidney stones     History of right breast cancer     Seasonal allergies     Uterine cyst history of. Hysterectomy    Vertigo

## 2022-04-25 NOTE — PROGRESS NOTE ADULT - SUBJECTIVE AND OBJECTIVE BOX
NP Note discussed with  Primary Attending    Patient is a 82y old  Male who presents with a chief complaint of Suspected GI bleed (25 Apr 2022 17:00)      INTERVAL HPI/OVERNIGHT EVENTS: Patient seen and examined at bedside.     MEDICATIONS  (STANDING):  enoxaparin Injectable 40 milliGRAM(s) SubCutaneous every 24 hours  ferrous    sulfate 325 milliGRAM(s) Oral daily  furosemide    Tablet 20 milliGRAM(s) Oral daily  lactulose Syrup 10 Gram(s) Oral two times a day  pantoprazole  Injectable 40 milliGRAM(s) IV Push every 12 hours  piperacillin/tazobactam IVPB.. 3.375 Gram(s) IV Intermittent every 8 hours  propranolol 10 milliGRAM(s) Oral daily  senna 2 Tablet(s) Oral at bedtime  spironolactone 50 milliGRAM(s) Oral daily    MEDICATIONS  (PRN):      __________________________________________________  REVIEW OF SYSTEMS:    CONSTITUTIONAL: No fever,   EYES: no acute visual disturbances  NECK: No pain or stiffness  RESPIRATORY: No cough; No shortness of breath  CARDIOVASCULAR: No chest pain, no palpitations  GASTROINTESTINAL: No pain. No nausea or vomiting; No diarrhea   NEUROLOGICAL: No headache or numbness, no tremors  MUSCULOSKELETAL: No joint pain, no muscle pain  GENITOURINARY: no dysuria, no frequency, no hesitancy  PSYCHIATRY: no depression , no anxiety  ALL OTHER  ROS negative        Vital Signs Last 24 Hrs  T(C): 36.6 (25 Apr 2022 14:42), Max: 36.8 (24 Apr 2022 20:36)  T(F): 97.9 (25 Apr 2022 14:42), Max: 98.2 (24 Apr 2022 20:36)  HR: 68 (25 Apr 2022 14:42) (61 - 82)  BP: 108/62 (25 Apr 2022 14:42) (103/67 - 115/58)  BP(mean): 72 (25 Apr 2022 14:42) (72 - 72)  RR: 18 (25 Apr 2022 14:42) (18 - 18)  SpO2: 99% (25 Apr 2022 14:42) (97% - 100%)    ________________________________________________  PHYSICAL EXAM:  GENERAL: NAD  HEENT: Normocephalic;  conjunctivae and sclerae clear; moist mucous membranes;   NECK : supple  CHEST/LUNG: Clear to auscultation bilaterally with good air entry   HEART: S1 S2  regular; no murmurs, gallops or rubs  ABDOMEN: Soft, Nontender, Nondistended; Bowel sounds present  EXTREMITIES: no cyanosis; no edema; no calf tenderness  SKIN: warm and dry; no rash  NERVOUS SYSTEM:  Awake and alert; Oriented  to place, person and time ; no new deficits    _________________________________________________  LABS:                        9.9    5.15  )-----------( 188      ( 24 Apr 2022 06:08 )             30.8     04-24    139  |  107  |  10  ----------------------------<  100<H>  3.9   |  25  |  1.22    Ca    8.6      24 Apr 2022 06:08      PT/INR - ( 24 Apr 2022 06:08 )   PT: 18.5 sec;   INR: 1.55 ratio         PTT - ( 24 Apr 2022 06:08 )  PTT:32.1 sec    CAPILLARY BLOOD GLUCOSE      POCT Blood Glucose.: 124 mg/dL (25 Apr 2022 11:46)  POCT Blood Glucose.: 94 mg/dL (25 Apr 2022 07:56)    RADIOLOGY & ADDITIONAL TESTS:  < from: CT Abdomen and Pelvis w/ IV Cont (04.20.22 @ 00:51) >  FINDINGS:    LOWER CHEST: Aortic valve and multivessel coronary artery calcification.   Trace left pleural effusion. Dependent atelectasis/scarring of the lung   parenchyma. Few subcentimeter clustered nodules of the left lower lobe   may represent distal airways mucus impaction versus small airways   infection.    LIVER: Cirrhotic liver morphology. Few areas of nonspecific peripheral   low attenuation along segments 4b and segment 3 of unclear significance.   Main portal vein and hepatic veins are patent.    BILE DUCTS / GALLBLADDER: Extrahepatic common duct is enlarged near the   gallbladder neck, with internal 2.3 cm masslike density which could   reflect a large stone, images  of series 6. Common bile duct stent   traverses this region. Mild intrahepatic biliary distention with bile   duct enhancement. There is mural thickening at the gallbladder neck as   well. Infectious and/or neoplastic etiologies for these findings can be   considered. Correlate for any signs and symptoms of cholangitis/sepsis.   Sequelae of chronic cholecystitis with fistula to the common duct can be   considered as well, as described on prior MRI from 2/8/2021. Correlation   with contrast-enhanced MRI/MRCP is recommended for characterization.    SPLEEN: Spleen size within normal limits  PANCREAS: No main ductal dilatation. Stable subcentimeter cystic   densities up to 5 mm of the pancreatic tail, image 59 series 2, likely   sidebranch IPMN or sequelae of prior pancreatitis.  ADRENALS: Unremarkable  KIDNEYS/URETERS: No hydronephrosis. Lobulated contour of the kidneys.   Subcentimeter hypodensities of the kidneys are too small to characterize.    BLADDER: Mildly distended  REPRODUCTIVE ORGANS: Prostate size within normal limits, though  suboptimally assessed due to streak artifact from right hip prosthesis.    BOWEL: Limited evaluation the absence of oral contrast. Stomach is   underdistended. No small bowel distention. The appendix is noninflamed.   Mild stool burden of the colonlimits evaluation of the colonic mucosa.  PERITONEUM: Moderate ascites.  VESSELS: No aneurysm of the abdominal aorta. Moderate to severe   aortoiliac atheromatous changes including small peripheral mural   thrombus/plaque ulceration involving the infrarenal segment. Visceral   artery atheromatous changes also identified.  RETROPERITONEUM/LYMPH NODES: Small volume nodes, particularly in the   right upper quadrant.  ABDOMINAL WALL: Chronic right rectus sheath collection stable in size   since MRI abdomen dated 2/8/2021, now measuring 3.8 x 1.8 x 7.0 cm,   likely a chronic right rectus sheath hematoma. High density material   within the collection is of unclear significance, not seen on the prior   noncontrasted CT. Correlate with hemodynamics and hematocrit of the   patient. High density material could reflect a prominent tortuous vessel   versus other blood products. In the setting of prior intervention,   calcified and/or other embolic material can be considered. Soft tissue   edema alongthe abdominal wall and flanks. Mild soft tissue thickening   adjacent to the sacrum/coccyx, image 136 series 2. Correlate for   potential sacral cubitus ulceration. Gynecomastia. Small fat-containing   umbilical hernia  BONES: Degenerative changes. Right hip prosthesis partially imaged.    IMPRESSION:    Extrahepatic common duct is enlarged near the gallbladder neck, with   internal 2.3 cm masslike density which could reflect a large stone,   images  of series 6. Common bile duct stent traverses this region.   Mild intrahepatic biliary distention with bile duct enhancement. There is   mural thickening at the gallbladder neck as well. Infectious and/or   neoplastic etiologies for these findings can be considered. Correlate for   any signs and symptoms of cholangitis/sepsis. Sequelae of chronic   cholecystitis with fistula to the common duct can be considered as well,   as described on prior MRI from 2/8/2021. Correlation with   contrast-enhanced MRI/MRCP is recommended for characterization.    Cirrhotic liver morphology. Moderate ascites. Few areas of nonspecific   peripheral low attenuation along segments 4b and segment 3 of unclear   significance.    Chronic right rectus sheath collection/hematoma, stable in size. High   density material within the collection is of unclear significance, not   seen on the prior noncontrasted CT from 1/3/2022 Correlate with   hemodynamics and hematocrit of the patient. High density material could   reflect a prominent tortuous vessel versus other blood products. In the   setting of prior intervention, calcified and/or other embolic material   can be considered.    Trace left pleural effusion. Few clustered left lower lobe nodules may   represent distal airways mucus impaction versus smallairways infection.    Mild soft tissue thickening adjacent to the sacrum/coccyx, image 136   series 2. Correlate for potential sacral cubitus ulceration.    --- End of Report ---    < end of copied text >    Imaging  Reviewed:  YES    Consultant(s) Notes Reviewed:   YES      Plan of care was discussed with patient and /or primary care giver; all questions and concerns were addressed  NP Note discussed with  Primary Attending    Patient is a 82y old  Male who presents with a chief complaint of Suspected GI bleed (25 Apr 2022 17:00)      INTERVAL HPI/OVERNIGHT EVENTS: Patient seen and examined at bedside.     MEDICATIONS  (STANDING):  enoxaparin Injectable 40 milliGRAM(s) SubCutaneous every 24 hours  ferrous    sulfate 325 milliGRAM(s) Oral daily  furosemide    Tablet 20 milliGRAM(s) Oral daily  lactulose Syrup 10 Gram(s) Oral two times a day  pantoprazole  Injectable 40 milliGRAM(s) IV Push every 12 hours  piperacillin/tazobactam IVPB.. 3.375 Gram(s) IV Intermittent every 8 hours  propranolol 10 milliGRAM(s) Oral daily  senna 2 Tablet(s) Oral at bedtime  spironolactone 50 milliGRAM(s) Oral daily    MEDICATIONS  (PRN):      __________________________________________________  REVIEW OF SYSTEMS:    CONSTITUTIONAL: No fever,   EYES: no acute visual disturbances  NECK: No pain or stiffness  RESPIRATORY: No cough; No shortness of breath  CARDIOVASCULAR: No chest pain, no palpitations  GASTROINTESTINAL: No pain. No nausea or vomiting; No diarrhea   NEUROLOGICAL: No headache or numbness, no tremors  MUSCULOSKELETAL: No joint pain, no muscle pain  GENITOURINARY: no dysuria, no frequency, no hesitancy  PSYCHIATRY: no depression , no anxiety  ALL OTHER  ROS negative        Vital Signs Last 24 Hrs  T(C): 36.6 (25 Apr 2022 14:42), Max: 36.8 (24 Apr 2022 20:36)  T(F): 97.9 (25 Apr 2022 14:42), Max: 98.2 (24 Apr 2022 20:36)  HR: 68 (25 Apr 2022 14:42) (61 - 82)  BP: 108/62 (25 Apr 2022 14:42) (103/67 - 115/58)  BP(mean): 72 (25 Apr 2022 14:42) (72 - 72)  RR: 18 (25 Apr 2022 14:42) (18 - 18)  SpO2: 99% (25 Apr 2022 14:42) (97% - 100%)    ________________________________________________  PHYSICAL EXAM:  GENERAL: NAD  HEENT: Normocephalic;  conjunctivae and sclerae clear; moist mucous membranes;   NECK : supple  CHEST/LUNG: Clear to auscultation bilaterally with good air entry   HEART: S1 S2  regular; no murmurs, gallops or rubs  ABDOMEN: Soft, RUQ tenderness mild, Nondistended; Bowel sounds present  EXTREMITIES: no cyanosis; no edema; no calf tenderness  SKIN: warm and dry; no rash  NERVOUS SYSTEM:  Awake and alert; Oriented  to place, person and time ; no new deficits    _________________________________________________  LABS:                        9.9    5.15  )-----------( 188      ( 24 Apr 2022 06:08 )             30.8     04-24    139  |  107  |  10  ----------------------------<  100<H>  3.9   |  25  |  1.22    Ca    8.6      24 Apr 2022 06:08      PT/INR - ( 24 Apr 2022 06:08 )   PT: 18.5 sec;   INR: 1.55 ratio         PTT - ( 24 Apr 2022 06:08 )  PTT:32.1 sec    CAPILLARY BLOOD GLUCOSE      POCT Blood Glucose.: 124 mg/dL (25 Apr 2022 11:46)  POCT Blood Glucose.: 94 mg/dL (25 Apr 2022 07:56)    RADIOLOGY & ADDITIONAL TESTS:  < from: CT Abdomen and Pelvis w/ IV Cont (04.20.22 @ 00:51) >  FINDINGS:    LOWER CHEST: Aortic valve and multivessel coronary artery calcification.   Trace left pleural effusion. Dependent atelectasis/scarring of the lung   parenchyma. Few subcentimeter clustered nodules of the left lower lobe   may represent distal airways mucus impaction versus small airways   infection.    LIVER: Cirrhotic liver morphology. Few areas of nonspecific peripheral   low attenuation along segments 4b and segment 3 of unclear significance.   Main portal vein and hepatic veins are patent.    BILE DUCTS / GALLBLADDER: Extrahepatic common duct is enlarged near the   gallbladder neck, with internal 2.3 cm masslike density which could   reflect a large stone, images  of series 6. Common bile duct stent   traverses this region. Mild intrahepatic biliary distention with bile   duct enhancement. There is mural thickening at the gallbladder neck as   well. Infectious and/or neoplastic etiologies for these findings can be   considered. Correlate for any signs and symptoms of cholangitis/sepsis.   Sequelae of chronic cholecystitis with fistula to the common duct can be   considered as well, as described on prior MRI from 2/8/2021. Correlation   with contrast-enhanced MRI/MRCP is recommended for characterization.    SPLEEN: Spleen size within normal limits  PANCREAS: No main ductal dilatation. Stable subcentimeter cystic   densities up to 5 mm of the pancreatic tail, image 59 series 2, likely   sidebranch IPMN or sequelae of prior pancreatitis.  ADRENALS: Unremarkable  KIDNEYS/URETERS: No hydronephrosis. Lobulated contour of the kidneys.   Subcentimeter hypodensities of the kidneys are too small to characterize.    BLADDER: Mildly distended  REPRODUCTIVE ORGANS: Prostate size within normal limits, though  suboptimally assessed due to streak artifact from right hip prosthesis.    BOWEL: Limited evaluation the absence of oral contrast. Stomach is   underdistended. No small bowel distention. The appendix is noninflamed.   Mild stool burden of the colonlimits evaluation of the colonic mucosa.  PERITONEUM: Moderate ascites.  VESSELS: No aneurysm of the abdominal aorta. Moderate to severe   aortoiliac atheromatous changes including small peripheral mural   thrombus/plaque ulceration involving the infrarenal segment. Visceral   artery atheromatous changes also identified.  RETROPERITONEUM/LYMPH NODES: Small volume nodes, particularly in the   right upper quadrant.  ABDOMINAL WALL: Chronic right rectus sheath collection stable in size   since MRI abdomen dated 2/8/2021, now measuring 3.8 x 1.8 x 7.0 cm,   likely a chronic right rectus sheath hematoma. High density material   within the collection is of unclear significance, not seen on the prior   noncontrasted CT. Correlate with hemodynamics and hematocrit of the   patient. High density material could reflect a prominent tortuous vessel   versus other blood products. In the setting of prior intervention,   calcified and/or other embolic material can be considered. Soft tissue   edema alongthe abdominal wall and flanks. Mild soft tissue thickening   adjacent to the sacrum/coccyx, image 136 series 2. Correlate for   potential sacral cubitus ulceration. Gynecomastia. Small fat-containing   umbilical hernia  BONES: Degenerative changes. Right hip prosthesis partially imaged.    IMPRESSION:    Extrahepatic common duct is enlarged near the gallbladder neck, with   internal 2.3 cm masslike density which could reflect a large stone,   images  of series 6. Common bile duct stent traverses this region.   Mild intrahepatic biliary distention with bile duct enhancement. There is   mural thickening at the gallbladder neck as well. Infectious and/or   neoplastic etiologies for these findings can be considered. Correlate for   any signs and symptoms of cholangitis/sepsis. Sequelae of chronic   cholecystitis with fistula to the common duct can be considered as well,   as described on prior MRI from 2/8/2021. Correlation with   contrast-enhanced MRI/MRCP is recommended for characterization.    Cirrhotic liver morphology. Moderate ascites. Few areas of nonspecific   peripheral low attenuation along segments 4b and segment 3 of unclear   significance.    Chronic right rectus sheath collection/hematoma, stable in size. High   density material within the collection is of unclear significance, not   seen on the prior noncontrasted CT from 1/3/2022 Correlate with   hemodynamics and hematocrit of the patient. High density material could   reflect a prominent tortuous vessel versus other blood products. In the   setting of prior intervention, calcified and/or other embolic material   can be considered.    Trace left pleural effusion. Few clustered left lower lobe nodules may   represent distal airways mucus impaction versus smallairways infection.    Mild soft tissue thickening adjacent to the sacrum/coccyx, image 136   series 2. Correlate for potential sacral cubitus ulceration.    --- End of Report ---    < end of copied text >    Imaging  Reviewed:  YES    Consultant(s) Notes Reviewed:   YES      Plan of care was discussed with patient and /or primary care giver; all questions and concerns were addressed

## 2022-04-25 NOTE — PROGRESS NOTE ADULT - SUBJECTIVE AND OBJECTIVE BOX
82y Male is under our care for     REVIEW OF SYSTEMS:  [  ] Not able to elicit      MEDS:  piperacillin/tazobactam IVPB.. 3.375 Gram(s) IV Intermittent every 8 hours    ALLERGIES: Allergies    No Known Allergies    Intolerances        VITALS:  Vital Signs Last 24 Hrs  T(C): 36.7 (25 Apr 2022 05:05), Max: 36.8 (24 Apr 2022 20:36)  T(F): 98 (25 Apr 2022 05:05), Max: 98.2 (24 Apr 2022 20:36)  HR: 72 (25 Apr 2022 05:05) (72 - 87)  BP: 115/58 (25 Apr 2022 05:05) (103/67 - 124/78)  BP(mean): --  RR: 18 (25 Apr 2022 05:05) (18 - 18)  SpO2: 97% (25 Apr 2022 05:05) (97% - 100%)      PHYSICAL EXAM:      LABS/DIAGNOSTIC TESTS:                        9.9    5.15  )-----------( 188      ( 24 Apr 2022 06:08 )             30.8     WBC Count: 5.15 K/uL (04-24 @ 06:08)  WBC Count: 5.07 K/uL (04-23 @ 08:16)  WBC Count: 5.11 K/uL (04-22 @ 07:01)  WBC Count: 4.30 K/uL (04-21 @ 07:22)  WBC Count: 4.58 K/uL (04-20 @ 12:45)    04-24    139  |  107  |  10  ----------------------------<  100<H>  3.9   |  25  |  1.22    Ca    8.6      24 Apr 2022 06:08        CULTURES:   .Body Fluid Peritoneal Fluid  04-21 @ 20:47   No growth  --    polymorphonuclear leukocytes seen  No organisms seen  by cytocentrifuge      .Blood Blood-Peripheral  04-20 @ 18:23   No growth to date.  --  --      Clean Catch Clean Catch (Midstream)  01-30 @ 02:08   >100,000 CFU/ml Escherichia coli ESBL  --  Escherichia coli ESBL        RADIOLOGY:  no new studies 82y Male is under our care for acute cholangitis - s/p ERCP.  Patient was seen laying comfortably in bed with no acute distress.  Patient denies any pain or discofort at this time, remains afebrile and WBC count is WNL.    REVIEW OF SYSTEMS:  [  ] Not able to elicit  General: no fevers no malaise  Chest: no cough no sob  GI: no nvd  : no urinary sxs   Skin: no rashes  Musculoskeletal: no trauma no LBP  Neuro: no ha's no dizziness     MEDS:  piperacillin/tazobactam IVPB.. 3.375 Gram(s) IV Intermittent every 8 hours    ALLERGIES: Allergies    No Known Allergies    Intolerances        VITALS:  Vital Signs Last 24 Hrs  T(C): 36.7 (25 Apr 2022 05:05), Max: 36.8 (24 Apr 2022 20:36)  T(F): 98 (25 Apr 2022 05:05), Max: 98.2 (24 Apr 2022 20:36)  HR: 72 (25 Apr 2022 05:05) (72 - 87)  BP: 115/58 (25 Apr 2022 05:05) (103/67 - 124/78)  BP(mean): --  RR: 18 (25 Apr 2022 05:05) (18 - 18)  SpO2: 97% (25 Apr 2022 05:05) (97% - 100%)      PHYSICAL EXAM:  HEENT: n/a  Neck: supple no LN's   Respiratory: lungs clear no rales  Cardiovascular: S1 S2 reg no murmurs  Gastrointestinal: +BS with soft, nondistended abdomen; nontender  Extremities: no edema  Skin: no rashes  Ortho: n/a  Neuro: AAO x 3      LABS/DIAGNOSTIC TESTS:                        9.9    5.15  )-----------( 188      ( 24 Apr 2022 06:08 )             30.8     WBC Count: 5.15 K/uL (04-24 @ 06:08)  WBC Count: 5.07 K/uL (04-23 @ 08:16)  WBC Count: 5.11 K/uL (04-22 @ 07:01)  WBC Count: 4.30 K/uL (04-21 @ 07:22)  WBC Count: 4.58 K/uL (04-20 @ 12:45)    04-24    139  |  107  |  10  ----------------------------<  100<H>  3.9   |  25  |  1.22    Ca    8.6      24 Apr 2022 06:08        CULTURES:   .Body Fluid Peritoneal Fluid  04-21 @ 20:47   No growth  --    polymorphonuclear leukocytes seen  No organisms seen  by cytocentrifuge      .Blood Blood-Peripheral  04-20 @ 18:23   No growth to date.  --  --      Clean Catch Clean Catch (Midstream)  01-30 @ 02:08   >100,000 CFU/ml Escherichia coli ESBL  --  Escherichia coli ESBL        RADIOLOGY:  no new studies

## 2022-04-25 NOTE — PROGRESS NOTE ADULT - PROBLEM SELECTOR PLAN 3
History of Mirizzi's s/p biliary stenting  considered poor surgical candidate for cholecystectomy  old retained stent (placed 6/2021, lost to f/u), underwent EHL and biliary stent placement in 6/2021  s/p ERCP on 4/22: Extensive choledocholithiasis and biliary purulence consistent with cholangitis and occluded biliary stent. Stent removed and replaced with new 10Fr/7cm plastic biliary stent. Cholangioscopy not performed given active cholangitis  advance diet as tolerated  Will need repeat ERCP in 2-3 months for stent removal and possible cholangioscopy. c/w lactulose, Lasix and propranolol   4/22  MELD Score without Dialysis: 11 points   daily pt/inr

## 2022-04-25 NOTE — PROGRESS NOTE ADULT - PROVIDER SPECIALTY LIST ADULT
Infectious Disease
Internal Medicine
Infectious Disease
Internal Medicine
Gastroenterology
Infectious Disease
Internal Medicine
Gastroenterology
Internal Medicine
Internal Medicine

## 2022-04-25 NOTE — PROGRESS NOTE ADULT - REASON FOR ADMISSION
Suspected GI bleed

## 2022-04-25 NOTE — PROGRESS NOTE ADULT - PROBLEM SELECTOR PLAN 7
known Mirizzi's syndrome   considered poor surgical candidate for cholecystectomy so underwent EHL and biliary stent placement in 6/2021  , tentative plans for ERCP with biliary stent removal and cystic duct stone Friday.
DVT PPX: Lovenox  GI PPX: PPI
known Mirizzi's syndrome   considered poor surgical candidate for cholecystectomy so underwent EHL and biliary stent placement in 6/2021  , tentative plans for ERCP with biliary stent removal and cystic duct stone Friday.

## 2022-04-25 NOTE — PROGRESS NOTE ADULT - PROBLEM SELECTOR PLAN 1
s/p paracentesis today with 2100 cc cloudy light serous fluid drained.    f/u cytology  continue Zosyn 3.375gm IV q8h    when ready for dc, can be switched to ceftin 500mg PO BID and flagyl 500mg PO TID both for 5 days  ID Dr. Peters s/p paracentesis today with 2100 cc cloudy light serous fluid drained.    f/u cytology  continue furosemide, spironolactone and propanolol

## 2022-04-25 NOTE — PROGRESS NOTE ADULT - NS ATTEND AMEND GEN_ALL_CORE FT
I agree with above
- Abd pain.  - Known cystic duct stone with Mirizzi's and biliary stent placement.  - Suspected occluded biliary stent.    Patient seen and examined. Doing well. Abd soft, NTND. Labs reviewed, ALP stable. For diagnostic paracentesis today. Plan for EGD and ERCP tomorrow with stent removal and reevaluation, possible repeat EHL of known cystic duct stone.
- Cholangitis.  - Occluded biliary stent s/p exchange.  - History of mirizzi's.    Patient seen and examined. REports feeling well. No abd pain or fever. Tolerating diet well. Plan for outpatient ERCP for stent removal and reevaluation. Finish course of abx for cholangitis. Please reconsult inpatient team as needed.
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 Per wife, patient follows with Dr. Cho and was scheduled to get stent exchanged but had multiple hospitalizations and wasn't  done.  Admitted for suspected Cholangitis.     Labs reviewed.     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 CT abdomen showed, Extrahepatic common duct is enlarged near the gallbladder neck, with internal 2.3 cm masslike density which could reflect a large stone. No fever, leukocytosis or jaundice. Blood cx negative to date. C/w IV Zosyn D-3.  Plan for ERCP with stent removal and possible stone extraction today.   -S/p IR guided paracentesis on 4/21, removed 2100cc fluid, studies not consistent with SBP (though performed while being on abx)   -CT showed Chronic right rectus sheath collection/hematoma, stable in size. High density material within the collection is of unclear significance, not seen on the prior noncontrasted CT from 1/3/2022. No surgical intervention warranted. Also per discussion with IR attending, given patient's hemodynamic stability, stable H/H  it's unlikely that patient has an acute bleeding at this time. Will hold off any intervention, monitor cbc. If concerns of bleeding develops will get CT with contrast   -C/w lactulose, titrate to 2-3 soft BM /day.   -C/w Lasix and aldactone   -C/w propanolol.  Discussed care plan with patient, verbalized understanding.
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 Per wife, patient follows with Dr. Cho and was scheduled to get stent exchanged but had multiple hospitalizations and wasn't  done.  Admitted for suspected Cholangitis.     Lab  holiday today.    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 S/p ERCP on 4/22 (Extensive choledocholithiasis and biliary purulence consistent with cholangitis and occluded biliary stent. Stent removed and replaced with new 10Fr/7cm plastic biliary stent) C/w IV Zosyn D-6. Source control achieved with stent removal, can switch to oral upon discharge. Patient will need repeat ERCP in 2-3 months for stent removal and possible cholangioscopy. Patient is medically stable for discharge, pending ROBINSON placement, needs auth.    -No concerns of SBP, acitic fluid studies noted   -C/w lactulose, titrate to 2-3 soft BM /day.   -C/w Lasix and aldactone   -C/w propanolol.  -Discussed GOC with patient and wife, FULL CODE for now.
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 Per wife, patient follows with Dr. Cho and was scheduled to get stent exchanged but had multiple hospitalizations and wasn't  done.  Admitted for suspected Cholangitis.     Labs reviewed.     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 CT abdomen showed, Extrahepatic common duct is enlarged near the gallbladder neck, with internal 2.3 cm masslike density which could reflect a large stone, CBD traverses this region. Appreciate ID recommendations, C/w IV Zosyn D-2. Follow blood cx. Tentative plan for ERCP in am, will keep NPO after midnight.   -S/p IR guided paracentesis today, removed 2100cc fluid, studies sent.   -CT showed Chronic right rectus sheath collection/hematoma, stable in size. High density material within the collection is of unclear significance, not seen on the prior noncontrasted CT from 1/3/2022. Discussed with IR attending at length, given patient's hemodynamic stability, stable H/H  it's unlikely that patient has an acute bleeding at this time. Will hold off any intervention, monitor cbc. If concerns of bleeding develops will get CT with contrast   -C/w lactulose, titrate to 2-3 soft BM /day   -C/w Lasix and aldactone   -C/w propanolol.  -Spoke with patient's wife Ms. Shannon Florez over phone and updated her about current clinical status and plan.

## 2022-04-26 ENCOUNTER — TRANSCRIPTION ENCOUNTER (OUTPATIENT)
Age: 82
End: 2022-04-26

## 2022-04-26 VITALS — HEART RATE: 67 BPM | OXYGEN SATURATION: 97 %

## 2022-04-26 LAB
ALBUMIN SERPL ELPH-MCNC: 1.7 G/DL — LOW (ref 3.5–5)
ALP SERPL-CCNC: 258 U/L — HIGH (ref 40–120)
ALT FLD-CCNC: 15 U/L DA — SIGNIFICANT CHANGE UP (ref 10–60)
ANION GAP SERPL CALC-SCNC: 8 MMOL/L — SIGNIFICANT CHANGE UP (ref 5–17)
AST SERPL-CCNC: 22 U/L — SIGNIFICANT CHANGE UP (ref 10–40)
BILIRUB SERPL-MCNC: 0.5 MG/DL — SIGNIFICANT CHANGE UP (ref 0.2–1.2)
BUN SERPL-MCNC: 11 MG/DL — SIGNIFICANT CHANGE UP (ref 7–18)
CALCIUM SERPL-MCNC: 8.7 MG/DL — SIGNIFICANT CHANGE UP (ref 8.4–10.5)
CHLORIDE SERPL-SCNC: 105 MMOL/L — SIGNIFICANT CHANGE UP (ref 96–108)
CO2 SERPL-SCNC: 25 MMOL/L — SIGNIFICANT CHANGE UP (ref 22–31)
CREAT SERPL-MCNC: 1.12 MG/DL — SIGNIFICANT CHANGE UP (ref 0.5–1.3)
CULTURE RESULTS: SIGNIFICANT CHANGE UP
EGFR: 66 ML/MIN/1.73M2 — SIGNIFICANT CHANGE UP
GLUCOSE BLDC GLUCOMTR-MCNC: 149 MG/DL — HIGH (ref 70–99)
GLUCOSE BLDC GLUCOMTR-MCNC: 89 MG/DL — SIGNIFICANT CHANGE UP (ref 70–99)
GLUCOSE SERPL-MCNC: 93 MG/DL — SIGNIFICANT CHANGE UP (ref 70–99)
HCT VFR BLD CALC: 30.1 % — LOW (ref 39–50)
HGB BLD-MCNC: 9.7 G/DL — LOW (ref 13–17)
MCHC RBC-ENTMCNC: 24.6 PG — LOW (ref 27–34)
MCHC RBC-ENTMCNC: 32.2 GM/DL — SIGNIFICANT CHANGE UP (ref 32–36)
MCV RBC AUTO: 76.4 FL — LOW (ref 80–100)
NRBC # BLD: 0 /100 WBCS — SIGNIFICANT CHANGE UP (ref 0–0)
PLATELET # BLD AUTO: 205 K/UL — SIGNIFICANT CHANGE UP (ref 150–400)
POTASSIUM SERPL-MCNC: 3.7 MMOL/L — SIGNIFICANT CHANGE UP (ref 3.5–5.3)
POTASSIUM SERPL-SCNC: 3.7 MMOL/L — SIGNIFICANT CHANGE UP (ref 3.5–5.3)
PROT SERPL-MCNC: 5.6 G/DL — LOW (ref 6–8.3)
RBC # BLD: 3.94 M/UL — LOW (ref 4.2–5.8)
RBC # FLD: 18 % — HIGH (ref 10.3–14.5)
SARS-COV-2 RNA SPEC QL NAA+PROBE: DETECTED
SODIUM SERPL-SCNC: 138 MMOL/L — SIGNIFICANT CHANGE UP (ref 135–145)
SPECIMEN SOURCE: SIGNIFICANT CHANGE UP
WBC # BLD: 5.98 K/UL — SIGNIFICANT CHANGE UP (ref 3.8–10.5)
WBC # FLD AUTO: 5.98 K/UL — SIGNIFICANT CHANGE UP (ref 3.8–10.5)

## 2022-04-26 PROCEDURE — 87040 BLOOD CULTURE FOR BACTERIA: CPT

## 2022-04-26 PROCEDURE — 81001 URINALYSIS AUTO W/SCOPE: CPT

## 2022-04-26 PROCEDURE — 80048 BASIC METABOLIC PNL TOTAL CA: CPT

## 2022-04-26 PROCEDURE — C1729: CPT

## 2022-04-26 PROCEDURE — C1889: CPT

## 2022-04-26 PROCEDURE — 99232 SBSQ HOSP IP/OBS MODERATE 35: CPT

## 2022-04-26 PROCEDURE — 88305 TISSUE EXAM BY PATHOLOGIST: CPT

## 2022-04-26 PROCEDURE — 86900 BLOOD TYPING SEROLOGIC ABO: CPT

## 2022-04-26 PROCEDURE — 85025 COMPLETE CBC W/AUTO DIFF WBC: CPT

## 2022-04-26 PROCEDURE — 74177 CT ABD & PELVIS W/CONTRAST: CPT | Mod: MA

## 2022-04-26 PROCEDURE — 82962 GLUCOSE BLOOD TEST: CPT

## 2022-04-26 PROCEDURE — 82945 GLUCOSE OTHER FLUID: CPT

## 2022-04-26 PROCEDURE — 99285 EMERGENCY DEPT VISIT HI MDM: CPT | Mod: 25

## 2022-04-26 PROCEDURE — 83550 IRON BINDING TEST: CPT

## 2022-04-26 PROCEDURE — 96375 TX/PRO/DX INJ NEW DRUG ADDON: CPT

## 2022-04-26 PROCEDURE — 83605 ASSAY OF LACTIC ACID: CPT

## 2022-04-26 PROCEDURE — 86850 RBC ANTIBODY SCREEN: CPT

## 2022-04-26 PROCEDURE — 76942 ECHO GUIDE FOR BIOPSY: CPT

## 2022-04-26 PROCEDURE — 88112 CYTOPATH CELL ENHANCE TECH: CPT

## 2022-04-26 PROCEDURE — 82042 OTHER SOURCE ALBUMIN QUAN EA: CPT

## 2022-04-26 PROCEDURE — 82140 ASSAY OF AMMONIA: CPT

## 2022-04-26 PROCEDURE — 87205 SMEAR GRAM STAIN: CPT

## 2022-04-26 PROCEDURE — 99239 HOSP IP/OBS DSCHRG MGMT >30: CPT

## 2022-04-26 PROCEDURE — 87070 CULTURE OTHR SPECIMN AEROBIC: CPT

## 2022-04-26 PROCEDURE — 36415 COLL VENOUS BLD VENIPUNCTURE: CPT

## 2022-04-26 PROCEDURE — 85610 PROTHROMBIN TIME: CPT

## 2022-04-26 PROCEDURE — 93005 ELECTROCARDIOGRAM TRACING: CPT

## 2022-04-26 PROCEDURE — 49083 ABD PARACENTESIS W/IMAGING: CPT

## 2022-04-26 PROCEDURE — 87635 SARS-COV-2 COVID-19 AMP PRB: CPT

## 2022-04-26 PROCEDURE — 84466 ASSAY OF TRANSFERRIN: CPT

## 2022-04-26 PROCEDURE — 87075 CULTR BACTERIA EXCEPT BLOOD: CPT

## 2022-04-26 PROCEDURE — 83540 ASSAY OF IRON: CPT

## 2022-04-26 PROCEDURE — 86901 BLOOD TYPING SEROLOGIC RH(D): CPT

## 2022-04-26 PROCEDURE — 83036 HEMOGLOBIN GLYCOSYLATED A1C: CPT

## 2022-04-26 PROCEDURE — C2625: CPT

## 2022-04-26 PROCEDURE — 85027 COMPLETE CBC AUTOMATED: CPT

## 2022-04-26 PROCEDURE — 83690 ASSAY OF LIPASE: CPT

## 2022-04-26 PROCEDURE — C1769: CPT

## 2022-04-26 PROCEDURE — 96374 THER/PROPH/DIAG INJ IV PUSH: CPT

## 2022-04-26 PROCEDURE — 85730 THROMBOPLASTIN TIME PARTIAL: CPT

## 2022-04-26 PROCEDURE — 80053 COMPREHEN METABOLIC PANEL: CPT

## 2022-04-26 PROCEDURE — 83735 ASSAY OF MAGNESIUM: CPT

## 2022-04-26 PROCEDURE — 97110 THERAPEUTIC EXERCISES: CPT

## 2022-04-26 PROCEDURE — 89051 BODY FLUID CELL COUNT: CPT

## 2022-04-26 PROCEDURE — 97116 GAIT TRAINING THERAPY: CPT

## 2022-04-26 PROCEDURE — 84100 ASSAY OF PHOSPHORUS: CPT

## 2022-04-26 PROCEDURE — 82728 ASSAY OF FERRITIN: CPT

## 2022-04-26 PROCEDURE — 97530 THERAPEUTIC ACTIVITIES: CPT

## 2022-04-26 RX ORDER — SPIRONOLACTONE 25 MG/1
2 TABLET, FILM COATED ORAL
Qty: 0 | Refills: 0 | DISCHARGE
Start: 2022-04-26

## 2022-04-26 RX ORDER — PROPRANOLOL HCL 160 MG
1 CAPSULE, EXTENDED RELEASE 24HR ORAL
Qty: 0 | Refills: 0 | DISCHARGE
Start: 2022-04-26

## 2022-04-26 RX ORDER — NYSTATIN 500MM UNIT
1 POWDER (EA) MISCELLANEOUS
Qty: 0 | Refills: 0 | DISCHARGE

## 2022-04-26 RX ORDER — SENNA PLUS 8.6 MG/1
2 TABLET ORAL
Qty: 0 | Refills: 0 | DISCHARGE
Start: 2022-04-26

## 2022-04-26 RX ORDER — METRONIDAZOLE 500 MG
1 TABLET ORAL
Qty: 15 | Refills: 0
Start: 2022-04-26 | End: 2022-04-30

## 2022-04-26 RX ORDER — FUROSEMIDE 40 MG
1 TABLET ORAL
Qty: 0 | Refills: 0 | DISCHARGE
Start: 2022-04-26

## 2022-04-26 RX ORDER — FERROUS SULFATE 325(65) MG
1 TABLET ORAL
Qty: 0 | Refills: 0 | DISCHARGE
Start: 2022-04-26

## 2022-04-26 RX ORDER — LACTULOSE 10 G/15ML
15 SOLUTION ORAL
Qty: 0 | Refills: 0 | DISCHARGE
Start: 2022-04-26

## 2022-04-26 RX ORDER — CEFUROXIME AXETIL 250 MG
1 TABLET ORAL
Qty: 10 | Refills: 0
Start: 2022-04-26 | End: 2022-04-30

## 2022-04-26 RX ADMIN — Medication 20 MILLIGRAM(S): at 05:49

## 2022-04-26 RX ADMIN — ENOXAPARIN SODIUM 40 MILLIGRAM(S): 100 INJECTION SUBCUTANEOUS at 12:45

## 2022-04-26 RX ADMIN — Medication 325 MILLIGRAM(S): at 12:44

## 2022-04-26 RX ADMIN — PANTOPRAZOLE SODIUM 40 MILLIGRAM(S): 20 TABLET, DELAYED RELEASE ORAL at 05:49

## 2022-04-26 RX ADMIN — SPIRONOLACTONE 50 MILLIGRAM(S): 25 TABLET, FILM COATED ORAL at 05:49

## 2022-04-26 RX ADMIN — PIPERACILLIN AND TAZOBACTAM 25 GRAM(S): 4; .5 INJECTION, POWDER, LYOPHILIZED, FOR SOLUTION INTRAVENOUS at 05:48

## 2022-04-26 RX ADMIN — LACTULOSE 10 GRAM(S): 10 SOLUTION ORAL at 05:49

## 2022-04-26 NOTE — DISCHARGE NOTE NURSING/CASE MANAGEMENT/SOCIAL WORK - NSDCPEFALRISK_GEN_ALL_CORE
For information on Fall & Injury Prevention, visit: [email or web address] OR  [email or web address] OR  https://www.cdc.gov/steadi/patient.html

## 2022-04-26 NOTE — DISCHARGE NOTE NURSING/CASE MANAGEMENT/SOCIAL WORK - PATIENT PORTAL LINK FT
You can access the FollowMyHealth Patient Portal offered by  by registering at the following website: [email or web address]. By joining [employer]s FollowMyHealth portal, you will also be able to view your health information using other applications (apps) compatible with our system.

## 2022-05-16 ENCOUNTER — APPOINTMENT (OUTPATIENT)
Dept: GASTROENTEROLOGY | Facility: CLINIC | Age: 82
End: 2022-05-16

## 2022-07-03 NOTE — PROGRESS NOTE ADULT - PROBLEM/PLAN-2
DISPLAY PLAN FREE TEXT
Essential hypertension

## 2022-07-12 NOTE — ED ADULT TRIAGE NOTE - TEMPERATURE IN FAHRENHEIT (DEGREES F)
INR is elevated to 5.7 per Abrahan' report.  We will hold warfarin x 3 and retest Friday.  Abrahan has provided smoothies with 1/4 spinach and extra last Tuesday.  She will report to the ER with any bleeding.      
98.7

## 2022-10-31 NOTE — DISCHARGE NOTE NURSING/CASE MANAGEMENT/SOCIAL WORK - NURSING SECTION COMPLETE
Encounter opened due to [employer] Update to close [facility] Program.    Roro Delgado  Care Management Specialist  [facility]  [phone]    
Patient/Caregiver provided printed discharge information.

## 2022-11-14 NOTE — ED ADULT NURSE NOTE - PAIN RATING/NUMBER SCALE (0-10): REST
Endometrial biopsy    Date/Time: 11/14/2022 3:30 PM  Performed by: Paresh Gilman MD  Authorized by: Paresh Gilman MD     Consent:     Consent obtained:  Written    Consent given by:  Patient    Patient questions answered: yes      Patient agrees, verbalizes understanding, and wants to proceed: yes      Educational handouts given: no      Instructions and paperwork completed: yes    Indication:     Indications: Menorrhagia    Pre-procedure:     Pre-procedure timeout performed: yes    Procedure:     Cervix cleaned and prepped: yes      A paracervical block was performed: no      An intracervical block was performed: yes      The cervix was dilated: no      Uterus sounded: yes      Uterus sound depth (cm):  7    Curettes used:  1    Specimen collected: specimen collected and sent to pathology    Comments:     Procedure comments:  Cervix was prepped with Betadine block with 1% lidocaine.  Tenaculum applied to the anterior lip of the cervix.  Using Endo cell sampler it was inserted within the endocervical canal and into the endometrial cavity.  Uterus and cervix sounded to approximately 7 cm.  Curettings obtained.    Bimanual exam was done prior to procedure uterus retroflexed irregular contour consistent with uterine leiomyoma.      Later in the office we discussed ultrasound report revealing uterine leiomyoma uterus measured 9.9 x 6.7 x 6.7 cm.  Fibroids noted.  Ovaries normal premenopausal size.  Small cyst seen of 1 ovary.  Discussed ovulatory cyst.      Later in the office we discussed proceeding with cyclic Provera for 3 months.  Instructions given.  Follow-up appointment with me in 4 months.  Patient understands and agrees with plan of management.    Discussed the need for proceeding with hysterectomy if significant menorrhagia or dysmenorrhea persist.  She will also use alternating Tylenol and ibuprofen for pelvic dysmenorrhea pain.  
3

## 2022-11-16 NOTE — DISCHARGE NOTE ADULT - USE THE 5 A'S (ASK, ADVISE, ASSESS, ASSIST, ARRANGE)
----- Message from Mati Peter MA sent at 11/16/2022 10:59 AM CST -----  Pt calling to reschedule his 11/22 appt because he has an appt with pain management the same day. [phone]     Statement Selected

## 2022-12-29 NOTE — PATIENT PROFILE ADULT - DEAF OR HARD OF HEARING?
Photo Preface (Leave Blank If You Do Not Want): Photographs were obtained today Detail Level: Zone no

## 2023-01-01 ENCOUNTER — INPATIENT (INPATIENT)
Facility: HOSPITAL | Age: 83
LOS: 8 days | Discharge: HOME | DRG: 919 | End: 2023-02-16
Attending: INTERNAL MEDICINE | Admitting: INTERNAL MEDICINE
Payer: MEDICARE

## 2023-01-01 ENCOUNTER — LABORATORY RESULT (OUTPATIENT)
Age: 83
End: 2023-01-01

## 2023-01-01 ENCOUNTER — RESULT REVIEW (OUTPATIENT)
Age: 83
End: 2023-01-01

## 2023-01-01 ENCOUNTER — TRANSCRIPTION ENCOUNTER (OUTPATIENT)
Age: 83
End: 2023-01-01

## 2023-01-01 ENCOUNTER — EMERGENCY (EMERGENCY)
Facility: HOSPITAL | Age: 83
LOS: 1 days | Discharge: HOME | End: 2023-01-01
Attending: EMERGENCY MEDICINE
Payer: MEDICARE

## 2023-01-01 ENCOUNTER — APPOINTMENT (OUTPATIENT)
Dept: GASTROENTEROLOGY | Facility: CLINIC | Age: 83
End: 2023-01-01

## 2023-01-01 ENCOUNTER — APPOINTMENT (OUTPATIENT)
Dept: GASTROENTEROLOGY | Facility: HOSPITAL | Age: 83
End: 2023-01-01

## 2023-01-01 ENCOUNTER — INPATIENT (INPATIENT)
Facility: HOSPITAL | Age: 83
LOS: 12 days | DRG: 870 | End: 2023-09-28
Attending: INTERNAL MEDICINE | Admitting: INTERNAL MEDICINE
Payer: MEDICARE

## 2023-01-01 ENCOUNTER — APPOINTMENT (OUTPATIENT)
Dept: GASTROENTEROLOGY | Facility: CLINIC | Age: 83
End: 2023-01-01
Payer: MEDICARE

## 2023-01-01 ENCOUNTER — INPATIENT (INPATIENT)
Facility: HOSPITAL | Age: 83
LOS: 2 days | Discharge: HOME | DRG: 377 | End: 2023-06-04
Attending: STUDENT IN AN ORGANIZED HEALTH CARE EDUCATION/TRAINING PROGRAM | Admitting: STUDENT IN AN ORGANIZED HEALTH CARE EDUCATION/TRAINING PROGRAM
Payer: MEDICARE

## 2023-01-01 ENCOUNTER — OUTPATIENT (OUTPATIENT)
Dept: OUTPATIENT SERVICES | Facility: HOSPITAL | Age: 83
LOS: 1 days | End: 2023-01-01
Payer: MEDICARE

## 2023-01-01 VITALS
HEIGHT: 67 IN | OXYGEN SATURATION: 99 % | RESPIRATION RATE: 18 BRPM | DIASTOLIC BLOOD PRESSURE: 67 MMHG | SYSTOLIC BLOOD PRESSURE: 94 MMHG | TEMPERATURE: 98 F | HEART RATE: 130 BPM | WEIGHT: 143.96 LBS

## 2023-01-01 VITALS
SYSTOLIC BLOOD PRESSURE: 91 MMHG | OXYGEN SATURATION: 97 % | DIASTOLIC BLOOD PRESSURE: 59 MMHG | HEART RATE: 56 BPM | RESPIRATION RATE: 18 BRPM

## 2023-01-01 VITALS
RESPIRATION RATE: 18 BRPM | WEIGHT: 143.96 LBS | HEIGHT: 66 IN | OXYGEN SATURATION: 99 % | TEMPERATURE: 98 F | HEART RATE: 78 BPM | SYSTOLIC BLOOD PRESSURE: 109 MMHG | DIASTOLIC BLOOD PRESSURE: 59 MMHG

## 2023-01-01 VITALS
TEMPERATURE: 98 F | OXYGEN SATURATION: 100 % | SYSTOLIC BLOOD PRESSURE: 125 MMHG | DIASTOLIC BLOOD PRESSURE: 74 MMHG | RESPIRATION RATE: 18 BRPM | WEIGHT: 141.98 LBS | HEART RATE: 90 BPM | HEIGHT: 67 IN

## 2023-01-01 VITALS
HEART RATE: 60 BPM | OXYGEN SATURATION: 99 % | DIASTOLIC BLOOD PRESSURE: 69 MMHG | SYSTOLIC BLOOD PRESSURE: 138 MMHG | RESPIRATION RATE: 18 BRPM

## 2023-01-01 VITALS
HEIGHT: 67 IN | DIASTOLIC BLOOD PRESSURE: 69 MMHG | RESPIRATION RATE: 20 BRPM | HEART RATE: 67 BPM | TEMPERATURE: 98 F | SYSTOLIC BLOOD PRESSURE: 137 MMHG | OXYGEN SATURATION: 99 % | WEIGHT: 141.98 LBS

## 2023-01-01 VITALS
OXYGEN SATURATION: 100 % | TEMPERATURE: 98 F | RESPIRATION RATE: 18 BRPM | SYSTOLIC BLOOD PRESSURE: 100 MMHG | HEART RATE: 82 BPM | DIASTOLIC BLOOD PRESSURE: 60 MMHG

## 2023-01-01 VITALS
DIASTOLIC BLOOD PRESSURE: 65 MMHG | SYSTOLIC BLOOD PRESSURE: 97 MMHG | TEMPERATURE: 97.3 F | BODY MASS INDEX: 28.99 KG/M2 | HEART RATE: 77 BPM | OXYGEN SATURATION: 96 % | HEIGHT: 65 IN | WEIGHT: 174 LBS

## 2023-01-01 VITALS
TEMPERATURE: 98 F | DIASTOLIC BLOOD PRESSURE: 55 MMHG | WEIGHT: 145.06 LBS | SYSTOLIC BLOOD PRESSURE: 91 MMHG | OXYGEN SATURATION: 96 % | HEIGHT: 70 IN | RESPIRATION RATE: 18 BRPM | HEART RATE: 85 BPM

## 2023-01-01 VITALS
SYSTOLIC BLOOD PRESSURE: 104 MMHG | TEMPERATURE: 98 F | RESPIRATION RATE: 18 BRPM | DIASTOLIC BLOOD PRESSURE: 64 MMHG | OXYGEN SATURATION: 100 % | HEART RATE: 68 BPM

## 2023-01-01 DIAGNOSIS — K74.60 UNSPECIFIED CIRRHOSIS OF LIVER: ICD-10-CM

## 2023-01-01 DIAGNOSIS — I95.9 HYPOTENSION, UNSPECIFIED: ICD-10-CM

## 2023-01-01 DIAGNOSIS — R16.0 HEPATOMEGALY, NOT ELSEWHERE CLASSIFIED: ICD-10-CM

## 2023-01-01 DIAGNOSIS — Z98.890 OTHER SPECIFIED POSTPROCEDURAL STATES: Chronic | ICD-10-CM

## 2023-01-01 DIAGNOSIS — K92.2 GASTROINTESTINAL HEMORRHAGE, UNSPECIFIED: ICD-10-CM

## 2023-01-01 DIAGNOSIS — R19.5 OTHER FECAL ABNORMALITIES: ICD-10-CM

## 2023-01-01 DIAGNOSIS — E03.9 HYPOTHYROIDISM, UNSPECIFIED: ICD-10-CM

## 2023-01-01 DIAGNOSIS — Z96.649 PRESENCE OF UNSPECIFIED ARTIFICIAL HIP JOINT: Chronic | ICD-10-CM

## 2023-01-01 DIAGNOSIS — K62.5 HEMORRHAGE OF ANUS AND RECTUM: ICD-10-CM

## 2023-01-01 DIAGNOSIS — Z29.9 ENCOUNTER FOR PROPHYLACTIC MEASURES, UNSPECIFIED: ICD-10-CM

## 2023-01-01 DIAGNOSIS — K83.09 OTHER CHOLANGITIS: ICD-10-CM

## 2023-01-01 DIAGNOSIS — E87.5 HYPERKALEMIA: ICD-10-CM

## 2023-01-01 DIAGNOSIS — A41.9 SEPSIS, UNSPECIFIED ORGANISM: ICD-10-CM

## 2023-01-01 DIAGNOSIS — E43 UNSPECIFIED SEVERE PROTEIN-CALORIE MALNUTRITION: ICD-10-CM

## 2023-01-01 DIAGNOSIS — Z87.39 PERSONAL HISTORY OF OTHER DISEASES OF THE MUSCULOSKELETAL SYSTEM AND CONNECTIVE TISSUE: Chronic | ICD-10-CM

## 2023-01-01 DIAGNOSIS — R07.9 CHEST PAIN, UNSPECIFIED: ICD-10-CM

## 2023-01-01 DIAGNOSIS — Z98.890 OTHER SPECIFIED POSTPROCEDURAL STATES: ICD-10-CM

## 2023-01-01 DIAGNOSIS — G93.41 METABOLIC ENCEPHALOPATHY: ICD-10-CM

## 2023-01-01 DIAGNOSIS — K80.50 CALCULUS OF BILE DUCT WITHOUT CHOLANGITIS OR CHOLECYSTITIS WITHOUT OBSTRUCTION: ICD-10-CM

## 2023-01-01 DIAGNOSIS — N17.9 ACUTE KIDNEY FAILURE, UNSPECIFIED: ICD-10-CM

## 2023-01-01 DIAGNOSIS — N18.9 CHRONIC KIDNEY DISEASE, UNSPECIFIED: ICD-10-CM

## 2023-01-01 DIAGNOSIS — R10.9 UNSPECIFIED ABDOMINAL PAIN: ICD-10-CM

## 2023-01-01 DIAGNOSIS — I10 ESSENTIAL (PRIMARY) HYPERTENSION: ICD-10-CM

## 2023-01-01 DIAGNOSIS — E87.1 HYPO-OSMOLALITY AND HYPONATREMIA: ICD-10-CM

## 2023-01-01 DIAGNOSIS — K80.20 CALCULUS OF GALLBLADDER WITHOUT CHOLECYSTITIS WITHOUT OBSTRUCTION: ICD-10-CM

## 2023-01-01 DIAGNOSIS — E16.2 HYPOGLYCEMIA, UNSPECIFIED: ICD-10-CM

## 2023-01-01 DIAGNOSIS — T85.590A OTHER MECHANICAL COMPLICATION OF BILE DUCT PROSTHESIS, INITIAL ENCOUNTER: ICD-10-CM

## 2023-01-01 DIAGNOSIS — K65.2 SPONTANEOUS BACTERIAL PERITONITIS: ICD-10-CM

## 2023-01-01 DIAGNOSIS — D69.6 THROMBOCYTOPENIA, UNSPECIFIED: ICD-10-CM

## 2023-01-01 DIAGNOSIS — K80.50 CALCULUS OF BILE DUCT W/OUT CHOLANGITIS OR CHOLECYSTITIS W/OUT OBSTRUCTION: ICD-10-CM

## 2023-01-01 DIAGNOSIS — J96.01 ACUTE RESPIRATORY FAILURE WITH HYPOXIA: ICD-10-CM

## 2023-01-01 DIAGNOSIS — K74.69 OTHER CIRRHOSIS OF LIVER: ICD-10-CM

## 2023-01-01 DIAGNOSIS — D64.9 ANEMIA, UNSPECIFIED: ICD-10-CM

## 2023-01-01 DIAGNOSIS — R57.8 OTHER SHOCK: ICD-10-CM

## 2023-01-01 DIAGNOSIS — L03.90 CELLULITIS, UNSPECIFIED: ICD-10-CM

## 2023-01-01 DIAGNOSIS — E80.6 OTHER DISORDERS OF BILIRUBIN METABOLISM: ICD-10-CM

## 2023-01-01 DIAGNOSIS — Z51.5 ENCOUNTER FOR PALLIATIVE CARE: ICD-10-CM

## 2023-01-01 LAB
-  COAGULASE NEGATIVE STAPHYLOCOCCUS: SIGNIFICANT CHANGE UP
ACANTHOCYTES BLD QL SMEAR: SLIGHT — SIGNIFICANT CHANGE UP
ACETONE SERPL-MCNC: NEGATIVE — SIGNIFICANT CHANGE UP
AFP-TM SERPL-MCNC: <1.8 NG/ML — SIGNIFICANT CHANGE UP
ALBUMIN FLD-MCNC: 0.6 G/DL — SIGNIFICANT CHANGE UP
ALBUMIN SERPL ELPH-MCNC: 1.6 G/DL — LOW (ref 3.5–5)
ALBUMIN SERPL ELPH-MCNC: 1.7 G/DL — LOW (ref 3.5–5)
ALBUMIN SERPL ELPH-MCNC: 1.9 G/DL — LOW (ref 3.5–5)
ALBUMIN SERPL ELPH-MCNC: 1.9 G/DL — LOW (ref 3.5–5)
ALBUMIN SERPL ELPH-MCNC: 2 G/DL — LOW (ref 3.5–5)
ALBUMIN SERPL ELPH-MCNC: 2.1 G/DL — LOW (ref 3.5–5)
ALBUMIN SERPL ELPH-MCNC: 2.2 G/DL — LOW (ref 3.5–5)
ALBUMIN SERPL ELPH-MCNC: 2.3 G/DL — LOW (ref 3.5–5)
ALBUMIN SERPL ELPH-MCNC: 2.3 G/DL — LOW (ref 3.5–5)
ALBUMIN SERPL ELPH-MCNC: 2.4 G/DL — LOW (ref 3.5–5)
ALBUMIN SERPL ELPH-MCNC: 2.4 G/DL — LOW (ref 3.5–5)
ALBUMIN SERPL ELPH-MCNC: 2.5 G/DL — LOW (ref 3.5–5)
ALBUMIN SERPL ELPH-MCNC: 2.7 G/DL — LOW (ref 3.5–5)
ALBUMIN SERPL ELPH-MCNC: 2.7 G/DL — LOW (ref 3.5–5)
ALBUMIN SERPL ELPH-MCNC: 2.8 G/DL — LOW (ref 3.5–5)
ALBUMIN SERPL ELPH-MCNC: 3 G/DL
ALBUMIN SERPL ELPH-MCNC: 3.2 G/DL — LOW (ref 3.5–5)
ALBUMIN SERPL ELPH-MCNC: 3.4 G/DL — LOW (ref 3.5–5)
ALBUMIN SERPL ELPH-MCNC: 3.6 G/DL — SIGNIFICANT CHANGE UP (ref 3.5–5)
ALBUMIN SERPL ELPH-MCNC: 3.7 G/DL — SIGNIFICANT CHANGE UP (ref 3.5–5)
ALBUMIN SERPL ELPH-MCNC: 3.8 G/DL — SIGNIFICANT CHANGE UP (ref 3.5–5)
ALBUMIN SERPL ELPH-MCNC: 3.8 G/DL — SIGNIFICANT CHANGE UP (ref 3.5–5)
ALBUMIN SERPL ELPH-MCNC: 3.9 G/DL — SIGNIFICANT CHANGE UP (ref 3.5–5)
ALP BLD-CCNC: 149 U/L
ALP SERPL-CCNC: 101 U/L — SIGNIFICANT CHANGE UP (ref 40–120)
ALP SERPL-CCNC: 103 U/L — SIGNIFICANT CHANGE UP (ref 40–120)
ALP SERPL-CCNC: 115 U/L — SIGNIFICANT CHANGE UP (ref 40–120)
ALP SERPL-CCNC: 116 U/L — SIGNIFICANT CHANGE UP (ref 40–120)
ALP SERPL-CCNC: 117 U/L — SIGNIFICANT CHANGE UP (ref 40–120)
ALP SERPL-CCNC: 125 U/L — HIGH (ref 40–120)
ALP SERPL-CCNC: 131 U/L — HIGH (ref 40–120)
ALP SERPL-CCNC: 142 U/L — HIGH (ref 40–120)
ALP SERPL-CCNC: 145 U/L — HIGH (ref 40–120)
ALP SERPL-CCNC: 149 U/L — HIGH (ref 40–120)
ALP SERPL-CCNC: 151 U/L — HIGH (ref 40–120)
ALP SERPL-CCNC: 161 U/L — HIGH (ref 40–120)
ALP SERPL-CCNC: 179 U/L — HIGH (ref 40–120)
ALP SERPL-CCNC: 219 U/L — HIGH (ref 40–120)
ALP SERPL-CCNC: 251 U/L — HIGH (ref 40–120)
ALP SERPL-CCNC: 266 U/L — HIGH (ref 40–120)
ALP SERPL-CCNC: 290 U/L — HIGH (ref 40–120)
ALP SERPL-CCNC: 66 U/L — SIGNIFICANT CHANGE UP (ref 40–120)
ALP SERPL-CCNC: 68 U/L — SIGNIFICANT CHANGE UP (ref 40–120)
ALP SERPL-CCNC: 69 U/L — SIGNIFICANT CHANGE UP (ref 40–120)
ALP SERPL-CCNC: 69 U/L — SIGNIFICANT CHANGE UP (ref 40–120)
ALP SERPL-CCNC: 70 U/L — SIGNIFICANT CHANGE UP (ref 40–120)
ALP SERPL-CCNC: 70 U/L — SIGNIFICANT CHANGE UP (ref 40–120)
ALP SERPL-CCNC: 74 U/L — SIGNIFICANT CHANGE UP (ref 40–120)
ALP SERPL-CCNC: 76 U/L — SIGNIFICANT CHANGE UP (ref 40–120)
ALP SERPL-CCNC: 76 U/L — SIGNIFICANT CHANGE UP (ref 40–120)
ALP SERPL-CCNC: 77 U/L — SIGNIFICANT CHANGE UP (ref 40–120)
ALP SERPL-CCNC: 79 U/L — SIGNIFICANT CHANGE UP (ref 40–120)
ALP SERPL-CCNC: 81 U/L — SIGNIFICANT CHANGE UP (ref 40–120)
ALP SERPL-CCNC: 86 U/L — SIGNIFICANT CHANGE UP (ref 40–120)
ALP SERPL-CCNC: 89 U/L — SIGNIFICANT CHANGE UP (ref 40–120)
ALP SERPL-CCNC: 93 U/L — SIGNIFICANT CHANGE UP (ref 40–120)
ALT FLD-CCNC: 10 U/L DA — SIGNIFICANT CHANGE UP (ref 10–60)
ALT FLD-CCNC: 11 U/L DA — SIGNIFICANT CHANGE UP (ref 10–60)
ALT FLD-CCNC: 12 U/L DA — SIGNIFICANT CHANGE UP (ref 10–60)
ALT FLD-CCNC: 12 U/L DA — SIGNIFICANT CHANGE UP (ref 10–60)
ALT FLD-CCNC: 13 U/L DA — SIGNIFICANT CHANGE UP (ref 10–60)
ALT FLD-CCNC: 14 U/L DA — SIGNIFICANT CHANGE UP (ref 10–60)
ALT FLD-CCNC: 15 U/L DA — SIGNIFICANT CHANGE UP (ref 10–60)
ALT FLD-CCNC: 16 U/L DA — SIGNIFICANT CHANGE UP (ref 10–60)
ALT FLD-CCNC: 17 U/L DA — SIGNIFICANT CHANGE UP (ref 10–60)
ALT FLD-CCNC: 18 U/L DA — SIGNIFICANT CHANGE UP (ref 10–60)
ALT FLD-CCNC: 19 U/L DA — SIGNIFICANT CHANGE UP (ref 10–60)
ALT FLD-CCNC: 19 U/L DA — SIGNIFICANT CHANGE UP (ref 10–60)
ALT FLD-CCNC: 22 U/L DA — SIGNIFICANT CHANGE UP (ref 10–60)
ALT FLD-CCNC: 67 U/L DA — HIGH (ref 10–60)
ALT FLD-CCNC: 7 U/L DA — LOW (ref 10–60)
ALT FLD-CCNC: 8 U/L DA — LOW (ref 10–60)
ALT FLD-CCNC: 9 U/L DA — LOW (ref 10–60)
ALT SERPL-CCNC: 11 U/L
AMMONIA BLD-MCNC: 18 UMOL/L — SIGNIFICANT CHANGE UP (ref 11–32)
AMMONIA BLD-MCNC: 50 UMOL/L — HIGH (ref 11–32)
AMMONIA BLD-MCNC: <10 UMOL/L — LOW (ref 11–32)
ANION GAP SERPL CALC-SCNC: 10 MMOL/L — SIGNIFICANT CHANGE UP (ref 5–17)
ANION GAP SERPL CALC-SCNC: 11 MMOL/L — SIGNIFICANT CHANGE UP (ref 5–17)
ANION GAP SERPL CALC-SCNC: 11 MMOL/L — SIGNIFICANT CHANGE UP (ref 5–17)
ANION GAP SERPL CALC-SCNC: 12 MMOL/L
ANION GAP SERPL CALC-SCNC: 12 MMOL/L — SIGNIFICANT CHANGE UP (ref 5–17)
ANION GAP SERPL CALC-SCNC: 13 MMOL/L — SIGNIFICANT CHANGE UP (ref 5–17)
ANION GAP SERPL CALC-SCNC: 13 MMOL/L — SIGNIFICANT CHANGE UP (ref 5–17)
ANION GAP SERPL CALC-SCNC: 14 MMOL/L — SIGNIFICANT CHANGE UP (ref 5–17)
ANION GAP SERPL CALC-SCNC: 15 MMOL/L — SIGNIFICANT CHANGE UP (ref 5–17)
ANION GAP SERPL CALC-SCNC: 15 MMOL/L — SIGNIFICANT CHANGE UP (ref 5–17)
ANION GAP SERPL CALC-SCNC: 17 MMOL/L — SIGNIFICANT CHANGE UP (ref 5–17)
ANION GAP SERPL CALC-SCNC: 2 MMOL/L — LOW (ref 5–17)
ANION GAP SERPL CALC-SCNC: 3 MMOL/L — LOW (ref 5–17)
ANION GAP SERPL CALC-SCNC: 5 MMOL/L — SIGNIFICANT CHANGE UP (ref 5–17)
ANION GAP SERPL CALC-SCNC: 6 MMOL/L — SIGNIFICANT CHANGE UP (ref 5–17)
ANION GAP SERPL CALC-SCNC: 7 MMOL/L — SIGNIFICANT CHANGE UP (ref 5–17)
ANION GAP SERPL CALC-SCNC: 7 MMOL/L — SIGNIFICANT CHANGE UP (ref 5–17)
ANION GAP SERPL CALC-SCNC: 8 MMOL/L — SIGNIFICANT CHANGE UP (ref 5–17)
ANION GAP SERPL CALC-SCNC: 9 MMOL/L — SIGNIFICANT CHANGE UP (ref 5–17)
ANISOCYTOSIS BLD QL: SIGNIFICANT CHANGE UP
ANISOCYTOSIS BLD QL: SLIGHT — SIGNIFICANT CHANGE UP
ANISOCYTOSIS BLD QL: SLIGHT — SIGNIFICANT CHANGE UP
APPEARANCE UR: CLEAR — SIGNIFICANT CHANGE UP
APTT BLD: 22.5 SEC — LOW (ref 27.5–35.5)
APTT BLD: 29.2 SEC — SIGNIFICANT CHANGE UP (ref 27.5–35.5)
APTT BLD: 29.4 SEC — SIGNIFICANT CHANGE UP (ref 27.5–35.5)
APTT BLD: 30.3 SEC — SIGNIFICANT CHANGE UP (ref 27.5–35.5)
APTT BLD: 32.7 SEC — SIGNIFICANT CHANGE UP (ref 27.5–35.5)
APTT BLD: 36.4 SEC — HIGH (ref 24.5–35.6)
APTT BLD: 45.3 SEC — HIGH (ref 24.5–35.6)
APTT BLD: 45.3 SEC — HIGH (ref 24.5–35.6)
APTT BLD: 49.2 SEC — HIGH (ref 24.5–35.6)
APTT BLD: 49.5 SEC — HIGH (ref 24.5–35.6)
APTT BLD: 51.2 SEC — HIGH (ref 24.5–35.6)
APTT BLD: 53.6 SEC — HIGH (ref 24.5–35.6)
AST SERPL-CCNC: 10 U/L — SIGNIFICANT CHANGE UP (ref 10–40)
AST SERPL-CCNC: 11 U/L — SIGNIFICANT CHANGE UP (ref 10–40)
AST SERPL-CCNC: 11 U/L — SIGNIFICANT CHANGE UP (ref 10–40)
AST SERPL-CCNC: 12 U/L — SIGNIFICANT CHANGE UP (ref 10–40)
AST SERPL-CCNC: 126 U/L — HIGH (ref 10–40)
AST SERPL-CCNC: 13 U/L — SIGNIFICANT CHANGE UP (ref 10–40)
AST SERPL-CCNC: 14 U/L — SIGNIFICANT CHANGE UP (ref 10–40)
AST SERPL-CCNC: 15 U/L — SIGNIFICANT CHANGE UP (ref 10–40)
AST SERPL-CCNC: 17 U/L — SIGNIFICANT CHANGE UP (ref 10–40)
AST SERPL-CCNC: 19 U/L — SIGNIFICANT CHANGE UP (ref 10–40)
AST SERPL-CCNC: 20 U/L — SIGNIFICANT CHANGE UP (ref 10–40)
AST SERPL-CCNC: 21 U/L — SIGNIFICANT CHANGE UP (ref 10–40)
AST SERPL-CCNC: 21 U/L — SIGNIFICANT CHANGE UP (ref 10–40)
AST SERPL-CCNC: 22 U/L
AST SERPL-CCNC: 22 U/L — SIGNIFICANT CHANGE UP (ref 10–40)
AST SERPL-CCNC: 22 U/L — SIGNIFICANT CHANGE UP (ref 10–40)
AST SERPL-CCNC: 24 U/L — SIGNIFICANT CHANGE UP (ref 10–40)
AST SERPL-CCNC: 26 U/L — SIGNIFICANT CHANGE UP (ref 10–40)
AST SERPL-CCNC: 26 U/L — SIGNIFICANT CHANGE UP (ref 10–40)
AST SERPL-CCNC: 28 U/L — SIGNIFICANT CHANGE UP (ref 10–40)
AST SERPL-CCNC: 30 U/L — SIGNIFICANT CHANGE UP (ref 10–40)
AST SERPL-CCNC: 33 U/L — SIGNIFICANT CHANGE UP (ref 10–40)
AST SERPL-CCNC: 46 U/L — HIGH (ref 10–40)
AST SERPL-CCNC: 5 U/L — LOW (ref 10–40)
AST SERPL-CCNC: 7 U/L — LOW (ref 10–40)
AST SERPL-CCNC: 8 U/L — LOW (ref 10–40)
AST SERPL-CCNC: 9 U/L — LOW (ref 10–40)
B PERT IGG+IGM PNL SER: ABNORMAL
BASE EXCESS BLDA CALC-SCNC: -5.5 MMOL/L — LOW (ref -2–3)
BASE EXCESS BLDA CALC-SCNC: -6 MMOL/L — LOW (ref -2–3)
BASE EXCESS BLDA CALC-SCNC: -7.6 MMOL/L — LOW (ref -2–3)
BASE EXCESS BLDA CALC-SCNC: -8.6 MMOL/L — LOW (ref -2–3)
BASE EXCESS BLDA CALC-SCNC: -8.8 MMOL/L — LOW (ref -2–3)
BASE EXCESS BLDA CALC-SCNC: -8.9 MMOL/L — LOW (ref -2–3)
BASE EXCESS BLDV CALC-SCNC: -10.4 MMOL/L — SIGNIFICANT CHANGE UP
BASE EXCESS BLDV CALC-SCNC: -3.5 MMOL/L — SIGNIFICANT CHANGE UP
BASE EXCESS BLDV CALC-SCNC: -6.1 MMOL/L — SIGNIFICANT CHANGE UP
BASOPHILS # BLD AUTO: 0 K/UL — SIGNIFICANT CHANGE UP (ref 0–0.2)
BASOPHILS # BLD AUTO: 0.01 K/UL — SIGNIFICANT CHANGE UP (ref 0–0.2)
BASOPHILS # BLD AUTO: 0.02 K/UL — SIGNIFICANT CHANGE UP (ref 0–0.2)
BASOPHILS # BLD AUTO: 0.02 K/UL — SIGNIFICANT CHANGE UP (ref 0–0.2)
BASOPHILS # BLD AUTO: 0.03 K/UL — SIGNIFICANT CHANGE UP (ref 0–0.2)
BASOPHILS # BLD AUTO: 0.03 K/UL — SIGNIFICANT CHANGE UP (ref 0–0.2)
BASOPHILS # BLD AUTO: 0.04 K/UL — SIGNIFICANT CHANGE UP (ref 0–0.2)
BASOPHILS # BLD AUTO: 0.05 K/UL — SIGNIFICANT CHANGE UP (ref 0–0.2)
BASOPHILS # BLD AUTO: 0.08 K/UL
BASOPHILS NFR BLD AUTO: 0 % — SIGNIFICANT CHANGE UP (ref 0–2)
BASOPHILS NFR BLD AUTO: 0.2 % — SIGNIFICANT CHANGE UP (ref 0–2)
BASOPHILS NFR BLD AUTO: 0.3 % — SIGNIFICANT CHANGE UP (ref 0–2)
BASOPHILS NFR BLD AUTO: 0.3 % — SIGNIFICANT CHANGE UP (ref 0–2)
BASOPHILS NFR BLD AUTO: 0.5 % — SIGNIFICANT CHANGE UP (ref 0–2)
BASOPHILS NFR BLD AUTO: 0.5 % — SIGNIFICANT CHANGE UP (ref 0–2)
BASOPHILS NFR BLD AUTO: 0.6 % — SIGNIFICANT CHANGE UP (ref 0–2)
BASOPHILS NFR BLD AUTO: 0.7 % — SIGNIFICANT CHANGE UP (ref 0–2)
BASOPHILS NFR BLD AUTO: 0.9 % — SIGNIFICANT CHANGE UP (ref 0–2)
BASOPHILS NFR BLD AUTO: 1.1 % — SIGNIFICANT CHANGE UP (ref 0–2)
BASOPHILS NFR BLD AUTO: 2.4 %
BILIRUB DIRECT SERPL-MCNC: 0.1 MG/DL — SIGNIFICANT CHANGE UP (ref 0–0.3)
BILIRUB DIRECT SERPL-MCNC: 0.2 MG/DL — SIGNIFICANT CHANGE UP (ref 0–0.3)
BILIRUB DIRECT SERPL-MCNC: 1 MG/DL — HIGH (ref 0–0.3)
BILIRUB INDIRECT FLD-MCNC: 0.2 MG/DL — SIGNIFICANT CHANGE UP (ref 0.2–1)
BILIRUB INDIRECT FLD-MCNC: 0.3 MG/DL — SIGNIFICANT CHANGE UP (ref 0.2–1)
BILIRUB INDIRECT FLD-MCNC: 0.4 MG/DL — SIGNIFICANT CHANGE UP (ref 0.2–1)
BILIRUB SERPL-MCNC: 0.3 MG/DL
BILIRUB SERPL-MCNC: 0.4 MG/DL — SIGNIFICANT CHANGE UP (ref 0.2–1.2)
BILIRUB SERPL-MCNC: 0.5 MG/DL — SIGNIFICANT CHANGE UP (ref 0.2–1.2)
BILIRUB SERPL-MCNC: 0.6 MG/DL — SIGNIFICANT CHANGE UP (ref 0.2–1.2)
BILIRUB SERPL-MCNC: 0.7 MG/DL — SIGNIFICANT CHANGE UP (ref 0.2–1.2)
BILIRUB SERPL-MCNC: 0.8 MG/DL — SIGNIFICANT CHANGE UP (ref 0.2–1.2)
BILIRUB SERPL-MCNC: 0.8 MG/DL — SIGNIFICANT CHANGE UP (ref 0.2–1.2)
BILIRUB SERPL-MCNC: 1.4 MG/DL — HIGH (ref 0.2–1.2)
BILIRUB SERPL-MCNC: 1.5 MG/DL — HIGH (ref 0.2–1.2)
BILIRUB SERPL-MCNC: 1.8 MG/DL — HIGH (ref 0.2–1.2)
BILIRUB SERPL-MCNC: 1.9 MG/DL — HIGH (ref 0.2–1.2)
BILIRUB SERPL-MCNC: 2 MG/DL — HIGH (ref 0.2–1.2)
BILIRUB SERPL-MCNC: 2 MG/DL — HIGH (ref 0.2–1.2)
BILIRUB SERPL-MCNC: 2.1 MG/DL — HIGH (ref 0.2–1.2)
BILIRUB SERPL-MCNC: 2.2 MG/DL — HIGH (ref 0.2–1.2)
BILIRUB SERPL-MCNC: 2.4 MG/DL — HIGH (ref 0.2–1.2)
BILIRUB SERPL-MCNC: 2.7 MG/DL — HIGH (ref 0.2–1.2)
BILIRUB SERPL-MCNC: 3.8 MG/DL — HIGH (ref 0.2–1.2)
BILIRUB SERPL-MCNC: 3.8 MG/DL — HIGH (ref 0.2–1.2)
BILIRUB UR-MCNC: NEGATIVE — SIGNIFICANT CHANGE UP
BLD GP AB SCN SERPL QL: SIGNIFICANT CHANGE UP
BLOOD GAS COMMENTS ARTERIAL: SIGNIFICANT CHANGE UP
BLOOD GAS COMMENTS, VENOUS: SIGNIFICANT CHANGE UP
BUN SERPL-MCNC: 15 MG/DL — SIGNIFICANT CHANGE UP (ref 7–18)
BUN SERPL-MCNC: 16 MG/DL
BUN SERPL-MCNC: 17 MG/DL — SIGNIFICANT CHANGE UP (ref 7–18)
BUN SERPL-MCNC: 17 MG/DL — SIGNIFICANT CHANGE UP (ref 7–18)
BUN SERPL-MCNC: 18 MG/DL — SIGNIFICANT CHANGE UP (ref 7–18)
BUN SERPL-MCNC: 20 MG/DL — HIGH (ref 7–18)
BUN SERPL-MCNC: 21 MG/DL — HIGH (ref 7–18)
BUN SERPL-MCNC: 22 MG/DL — HIGH (ref 7–18)
BUN SERPL-MCNC: 23 MG/DL — HIGH (ref 7–18)
BUN SERPL-MCNC: 23 MG/DL — HIGH (ref 7–18)
BUN SERPL-MCNC: 24 MG/DL — HIGH (ref 7–18)
BUN SERPL-MCNC: 24 MG/DL — HIGH (ref 7–18)
BUN SERPL-MCNC: 25 MG/DL — HIGH (ref 7–18)
BUN SERPL-MCNC: 26 MG/DL — HIGH (ref 7–18)
BUN SERPL-MCNC: 27 MG/DL — HIGH (ref 7–18)
BUN SERPL-MCNC: 27 MG/DL — HIGH (ref 7–18)
BUN SERPL-MCNC: 29 MG/DL — HIGH (ref 7–18)
BUN SERPL-MCNC: 34 MG/DL — HIGH (ref 7–18)
BUN SERPL-MCNC: 36 MG/DL — HIGH (ref 7–18)
BUN SERPL-MCNC: 37 MG/DL — HIGH (ref 7–18)
BUN SERPL-MCNC: 41 MG/DL — HIGH (ref 7–18)
BUN SERPL-MCNC: 48 MG/DL — HIGH (ref 7–18)
BUN SERPL-MCNC: 49 MG/DL — HIGH (ref 7–18)
BUN SERPL-MCNC: 49 MG/DL — HIGH (ref 7–18)
BUN SERPL-MCNC: 52 MG/DL — HIGH (ref 7–18)
BUN SERPL-MCNC: 54 MG/DL — HIGH (ref 7–18)
BUN SERPL-MCNC: 56 MG/DL — HIGH (ref 7–18)
BUN SERPL-MCNC: 58 MG/DL — HIGH (ref 7–18)
BUN SERPL-MCNC: 64 MG/DL — HIGH (ref 7–18)
BUN SERPL-MCNC: 72 MG/DL — HIGH (ref 7–18)
BUN SERPL-MCNC: 72 MG/DL — HIGH (ref 7–18)
CALCIUM SERPL-MCNC: 6.5 MG/DL — CRITICAL LOW (ref 8.4–10.5)
CALCIUM SERPL-MCNC: 7.8 MG/DL — LOW (ref 8.4–10.5)
CALCIUM SERPL-MCNC: 7.9 MG/DL — LOW (ref 8.4–10.5)
CALCIUM SERPL-MCNC: 7.9 MG/DL — LOW (ref 8.4–10.5)
CALCIUM SERPL-MCNC: 8 MG/DL — LOW (ref 8.4–10.5)
CALCIUM SERPL-MCNC: 8 MG/DL — LOW (ref 8.4–10.5)
CALCIUM SERPL-MCNC: 8.1 MG/DL — LOW (ref 8.4–10.5)
CALCIUM SERPL-MCNC: 8.2 MG/DL — LOW (ref 8.4–10.5)
CALCIUM SERPL-MCNC: 8.3 MG/DL — LOW (ref 8.4–10.5)
CALCIUM SERPL-MCNC: 8.5 MG/DL — SIGNIFICANT CHANGE UP (ref 8.4–10.5)
CALCIUM SERPL-MCNC: 8.6 MG/DL — SIGNIFICANT CHANGE UP (ref 8.4–10.5)
CALCIUM SERPL-MCNC: 8.7 MG/DL — SIGNIFICANT CHANGE UP (ref 8.4–10.5)
CALCIUM SERPL-MCNC: 8.8 MG/DL — SIGNIFICANT CHANGE UP (ref 8.4–10.5)
CALCIUM SERPL-MCNC: 8.9 MG/DL
CALCIUM SERPL-MCNC: 8.9 MG/DL — SIGNIFICANT CHANGE UP (ref 8.4–10.5)
CALCIUM SERPL-MCNC: 9 MG/DL — SIGNIFICANT CHANGE UP (ref 8.4–10.5)
CALCIUM SERPL-MCNC: 9.1 MG/DL — SIGNIFICANT CHANGE UP (ref 8.4–10.5)
CALCIUM SERPL-MCNC: 9.3 MG/DL — SIGNIFICANT CHANGE UP (ref 8.4–10.5)
CALCIUM SERPL-MCNC: 9.3 MG/DL — SIGNIFICANT CHANGE UP (ref 8.4–10.5)
CALCIUM SERPL-MCNC: 9.7 MG/DL — SIGNIFICANT CHANGE UP (ref 8.4–10.5)
CANCER AG19-9 SERPL-ACNC: <2 U/ML — SIGNIFICANT CHANGE UP
CEA SERPL-MCNC: 1.8 NG/ML — SIGNIFICANT CHANGE UP (ref 0–3.8)
CHLORIDE SERPL-SCNC: 100 MMOL/L — SIGNIFICANT CHANGE UP (ref 96–108)
CHLORIDE SERPL-SCNC: 102 MMOL/L — SIGNIFICANT CHANGE UP (ref 96–108)
CHLORIDE SERPL-SCNC: 102 MMOL/L — SIGNIFICANT CHANGE UP (ref 96–108)
CHLORIDE SERPL-SCNC: 103 MMOL/L — SIGNIFICANT CHANGE UP (ref 96–108)
CHLORIDE SERPL-SCNC: 104 MMOL/L — SIGNIFICANT CHANGE UP (ref 96–108)
CHLORIDE SERPL-SCNC: 105 MMOL/L — SIGNIFICANT CHANGE UP (ref 96–108)
CHLORIDE SERPL-SCNC: 106 MMOL/L — SIGNIFICANT CHANGE UP (ref 96–108)
CHLORIDE SERPL-SCNC: 107 MMOL/L — SIGNIFICANT CHANGE UP (ref 96–108)
CHLORIDE SERPL-SCNC: 108 MMOL/L — SIGNIFICANT CHANGE UP (ref 96–108)
CHLORIDE SERPL-SCNC: 109 MMOL/L
CHLORIDE SERPL-SCNC: 110 MMOL/L — HIGH (ref 96–108)
CHLORIDE SERPL-SCNC: 111 MMOL/L — HIGH (ref 96–108)
CHLORIDE SERPL-SCNC: 112 MMOL/L — HIGH (ref 96–108)
CHLORIDE SERPL-SCNC: 113 MMOL/L — HIGH (ref 96–108)
CHLORIDE SERPL-SCNC: 114 MMOL/L — HIGH (ref 96–108)
CHLORIDE SERPL-SCNC: 114 MMOL/L — HIGH (ref 96–108)
CHLORIDE SERPL-SCNC: 115 MMOL/L — HIGH (ref 96–108)
CHLORIDE SERPL-SCNC: 120 MMOL/L — HIGH (ref 96–108)
CHLORIDE SERPL-SCNC: 121 MMOL/L — HIGH (ref 96–108)
CHLORIDE SERPL-SCNC: 121 MMOL/L — HIGH (ref 96–108)
CHLORIDE SERPL-SCNC: 123 MMOL/L — HIGH (ref 96–108)
CHLORIDE SERPL-SCNC: 124 MMOL/L — HIGH (ref 96–108)
CHLORIDE SERPL-SCNC: 98 MMOL/L — SIGNIFICANT CHANGE UP (ref 96–108)
CK SERPL-CCNC: 116 U/L — SIGNIFICANT CHANGE UP (ref 35–232)
CO2 SERPL-SCNC: 15 MMOL/L — LOW (ref 22–31)
CO2 SERPL-SCNC: 15 MMOL/L — LOW (ref 22–31)
CO2 SERPL-SCNC: 16 MMOL/L — LOW (ref 22–31)
CO2 SERPL-SCNC: 17 MMOL/L — LOW (ref 22–31)
CO2 SERPL-SCNC: 18 MMOL/L — LOW (ref 22–31)
CO2 SERPL-SCNC: 19 MMOL/L — LOW (ref 22–31)
CO2 SERPL-SCNC: 20 MMOL/L — LOW (ref 22–31)
CO2 SERPL-SCNC: 21 MMOL/L — LOW (ref 22–31)
CO2 SERPL-SCNC: 22 MMOL/L — SIGNIFICANT CHANGE UP (ref 22–31)
CO2 SERPL-SCNC: 23 MMOL/L — SIGNIFICANT CHANGE UP (ref 22–31)
CO2 SERPL-SCNC: 24 MMOL/L
CO2 SERPL-SCNC: 24 MMOL/L — SIGNIFICANT CHANGE UP (ref 22–31)
CO2 SERPL-SCNC: 24 MMOL/L — SIGNIFICANT CHANGE UP (ref 22–31)
CO2 SERPL-SCNC: 28 MMOL/L — SIGNIFICANT CHANGE UP (ref 22–31)
COLOR FLD: YELLOW — SIGNIFICANT CHANGE UP
COLOR SPEC: YELLOW — SIGNIFICANT CHANGE UP
COMMENT - FLUIDS: SIGNIFICANT CHANGE UP
CREAT ?TM UR-MCNC: 45 MG/DL — SIGNIFICANT CHANGE UP
CREAT ?TM UR-MCNC: 53 MG/DL — SIGNIFICANT CHANGE UP
CREAT ?TM UR-MCNC: 71 MG/DL — SIGNIFICANT CHANGE UP
CREAT SERPL-MCNC: 0.78 MG/DL — SIGNIFICANT CHANGE UP (ref 0.5–1.3)
CREAT SERPL-MCNC: 0.88 MG/DL — SIGNIFICANT CHANGE UP (ref 0.5–1.3)
CREAT SERPL-MCNC: 0.91 MG/DL
CREAT SERPL-MCNC: 0.95 MG/DL — SIGNIFICANT CHANGE UP (ref 0.5–1.3)
CREAT SERPL-MCNC: 1.05 MG/DL — SIGNIFICANT CHANGE UP (ref 0.5–1.3)
CREAT SERPL-MCNC: 1.09 MG/DL — SIGNIFICANT CHANGE UP (ref 0.5–1.3)
CREAT SERPL-MCNC: 1.11 MG/DL — SIGNIFICANT CHANGE UP (ref 0.5–1.3)
CREAT SERPL-MCNC: 1.14 MG/DL — SIGNIFICANT CHANGE UP (ref 0.5–1.3)
CREAT SERPL-MCNC: 1.17 MG/DL — SIGNIFICANT CHANGE UP (ref 0.5–1.3)
CREAT SERPL-MCNC: 1.21 MG/DL — SIGNIFICANT CHANGE UP (ref 0.5–1.3)
CREAT SERPL-MCNC: 1.23 MG/DL — SIGNIFICANT CHANGE UP (ref 0.5–1.3)
CREAT SERPL-MCNC: 1.23 MG/DL — SIGNIFICANT CHANGE UP (ref 0.5–1.3)
CREAT SERPL-MCNC: 1.27 MG/DL — SIGNIFICANT CHANGE UP (ref 0.5–1.3)
CREAT SERPL-MCNC: 1.28 MG/DL — SIGNIFICANT CHANGE UP (ref 0.5–1.3)
CREAT SERPL-MCNC: 1.29 MG/DL — SIGNIFICANT CHANGE UP (ref 0.5–1.3)
CREAT SERPL-MCNC: 1.3 MG/DL — SIGNIFICANT CHANGE UP (ref 0.5–1.3)
CREAT SERPL-MCNC: 1.31 MG/DL — HIGH (ref 0.5–1.3)
CREAT SERPL-MCNC: 1.5 MG/DL — HIGH (ref 0.5–1.3)
CREAT SERPL-MCNC: 1.51 MG/DL — HIGH (ref 0.5–1.3)
CREAT SERPL-MCNC: 1.51 MG/DL — HIGH (ref 0.5–1.3)
CREAT SERPL-MCNC: 1.52 MG/DL — HIGH (ref 0.5–1.3)
CREAT SERPL-MCNC: 1.53 MG/DL — HIGH (ref 0.5–1.3)
CREAT SERPL-MCNC: 1.55 MG/DL — HIGH (ref 0.5–1.3)
CREAT SERPL-MCNC: 1.57 MG/DL — HIGH (ref 0.5–1.3)
CREAT SERPL-MCNC: 1.57 MG/DL — HIGH (ref 0.5–1.3)
CREAT SERPL-MCNC: 1.68 MG/DL — HIGH (ref 0.5–1.3)
CREAT SERPL-MCNC: 1.7 MG/DL — HIGH (ref 0.5–1.3)
CREAT SERPL-MCNC: 1.76 MG/DL — HIGH (ref 0.5–1.3)
CREAT SERPL-MCNC: 1.78 MG/DL — HIGH (ref 0.5–1.3)
CREAT SERPL-MCNC: 1.82 MG/DL — HIGH (ref 0.5–1.3)
CREAT SERPL-MCNC: 1.91 MG/DL — HIGH (ref 0.5–1.3)
CREAT SERPL-MCNC: 1.93 MG/DL — HIGH (ref 0.5–1.3)
CREAT SERPL-MCNC: 1.94 MG/DL — HIGH (ref 0.5–1.3)
CREAT SERPL-MCNC: 1.94 MG/DL — HIGH (ref 0.5–1.3)
CREAT SERPL-MCNC: 2 MG/DL — HIGH (ref 0.5–1.3)
CREAT SERPL-MCNC: 2.02 MG/DL — HIGH (ref 0.5–1.3)
CREAT SERPL-MCNC: 2.08 MG/DL — HIGH (ref 0.5–1.3)
CREAT SERPL-MCNC: 2.08 MG/DL — HIGH (ref 0.5–1.3)
CREAT SERPL-MCNC: 2.12 MG/DL — HIGH (ref 0.5–1.3)
CULTURE RESULTS: SIGNIFICANT CHANGE UP
DIFF PNL FLD: ABNORMAL
EGFR: 30 ML/MIN/1.73M2 — LOW
EGFR: 31 ML/MIN/1.73M2 — LOW
EGFR: 31 ML/MIN/1.73M2 — LOW
EGFR: 32 ML/MIN/1.73M2 — LOW
EGFR: 33 ML/MIN/1.73M2 — LOW
EGFR: 34 ML/MIN/1.73M2 — LOW
EGFR: 36 ML/MIN/1.73M2 — LOW
EGFR: 37 ML/MIN/1.73M2 — LOW
EGFR: 38 ML/MIN/1.73M2 — LOW
EGFR: 40 ML/MIN/1.73M2 — LOW
EGFR: 40 ML/MIN/1.73M2 — LOW
EGFR: 43 ML/MIN/1.73M2 — LOW
EGFR: 43 ML/MIN/1.73M2 — LOW
EGFR: 44 ML/MIN/1.73M2 — LOW
EGFR: 45 ML/MIN/1.73M2 — LOW
EGFR: 45 ML/MIN/1.73M2 — LOW
EGFR: 46 ML/MIN/1.73M2 — LOW
EGFR: 54 ML/MIN/1.73M2 — LOW
EGFR: 55 ML/MIN/1.73M2 — LOW
EGFR: 55 ML/MIN/1.73M2 — LOW
EGFR: 56 ML/MIN/1.73M2 — LOW
EGFR: 56 ML/MIN/1.73M2 — LOW
EGFR: 59 ML/MIN/1.73M2 — LOW
EGFR: 59 ML/MIN/1.73M2 — LOW
EGFR: 60 ML/MIN/1.73M2 — SIGNIFICANT CHANGE UP
EGFR: 62 ML/MIN/1.73M2 — SIGNIFICANT CHANGE UP
EGFR: 64 ML/MIN/1.73M2 — SIGNIFICANT CHANGE UP
EGFR: 66 ML/MIN/1.73M2 — SIGNIFICANT CHANGE UP
EGFR: 68 ML/MIN/1.73M2 — SIGNIFICANT CHANGE UP
EGFR: 70 ML/MIN/1.73M2 — SIGNIFICANT CHANGE UP
EGFR: 80 ML/MIN/1.73M2 — SIGNIFICANT CHANGE UP
EGFR: 84 ML/MIN/1.73M2
EGFR: 85 ML/MIN/1.73M2 — SIGNIFICANT CHANGE UP
EGFR: 88 ML/MIN/1.73M2 — SIGNIFICANT CHANGE UP
ELLIPTOCYTES BLD QL SMEAR: SLIGHT — SIGNIFICANT CHANGE UP
EOSINOPHIL # BLD AUTO: 0 K/UL — SIGNIFICANT CHANGE UP (ref 0–0.5)
EOSINOPHIL # BLD AUTO: 0 K/UL — SIGNIFICANT CHANGE UP (ref 0–0.5)
EOSINOPHIL # BLD AUTO: 0.01 K/UL — SIGNIFICANT CHANGE UP (ref 0–0.5)
EOSINOPHIL # BLD AUTO: 0.01 K/UL — SIGNIFICANT CHANGE UP (ref 0–0.5)
EOSINOPHIL # BLD AUTO: 0.02 K/UL — SIGNIFICANT CHANGE UP (ref 0–0.5)
EOSINOPHIL # BLD AUTO: 0.06 K/UL — SIGNIFICANT CHANGE UP (ref 0–0.5)
EOSINOPHIL # BLD AUTO: 0.16 K/UL — SIGNIFICANT CHANGE UP (ref 0–0.5)
EOSINOPHIL # BLD AUTO: 0.22 K/UL — SIGNIFICANT CHANGE UP (ref 0–0.5)
EOSINOPHIL # BLD AUTO: 0.26 K/UL
EOSINOPHIL # BLD AUTO: 0.38 K/UL — SIGNIFICANT CHANGE UP (ref 0–0.5)
EOSINOPHIL # BLD AUTO: 0.39 K/UL — SIGNIFICANT CHANGE UP (ref 0–0.5)
EOSINOPHIL # BLD AUTO: 0.6 K/UL — HIGH (ref 0–0.5)
EOSINOPHIL # BLD AUTO: 0.67 K/UL — HIGH (ref 0–0.5)
EOSINOPHIL NFR BLD AUTO: 0 % — SIGNIFICANT CHANGE UP (ref 0–6)
EOSINOPHIL NFR BLD AUTO: 0 % — SIGNIFICANT CHANGE UP (ref 0–6)
EOSINOPHIL NFR BLD AUTO: 0.1 % — SIGNIFICANT CHANGE UP (ref 0–6)
EOSINOPHIL NFR BLD AUTO: 0.2 % — SIGNIFICANT CHANGE UP (ref 0–6)
EOSINOPHIL NFR BLD AUTO: 0.2 % — SIGNIFICANT CHANGE UP (ref 0–6)
EOSINOPHIL NFR BLD AUTO: 0.5 % — SIGNIFICANT CHANGE UP (ref 0–6)
EOSINOPHIL NFR BLD AUTO: 1.1 % — SIGNIFICANT CHANGE UP (ref 0–6)
EOSINOPHIL NFR BLD AUTO: 1.9 % — SIGNIFICANT CHANGE UP (ref 0–6)
EOSINOPHIL NFR BLD AUTO: 10 % — HIGH (ref 0–6)
EOSINOPHIL NFR BLD AUTO: 11.6 % — HIGH (ref 0–6)
EOSINOPHIL NFR BLD AUTO: 5.2 % — SIGNIFICANT CHANGE UP (ref 0–6)
EOSINOPHIL NFR BLD AUTO: 7.7 %
EOSINOPHIL NFR BLD AUTO: 8.1 % — HIGH (ref 0–6)
EPI CELLS # UR: SIGNIFICANT CHANGE UP
FLUID INTAKE SUBSTANCE CLASS: SIGNIFICANT CHANGE UP
GAS PNL BLDA: SIGNIFICANT CHANGE UP
GLUCOSE BLDC GLUCOMTR-MCNC: 100 MG/DL — HIGH (ref 70–99)
GLUCOSE BLDC GLUCOMTR-MCNC: 100 MG/DL — HIGH (ref 70–99)
GLUCOSE BLDC GLUCOMTR-MCNC: 109 MG/DL — HIGH (ref 70–99)
GLUCOSE BLDC GLUCOMTR-MCNC: 113 MG/DL — HIGH (ref 70–99)
GLUCOSE BLDC GLUCOMTR-MCNC: 115 MG/DL — HIGH (ref 70–99)
GLUCOSE BLDC GLUCOMTR-MCNC: 120 MG/DL — HIGH (ref 70–99)
GLUCOSE BLDC GLUCOMTR-MCNC: 124 MG/DL — HIGH (ref 70–99)
GLUCOSE BLDC GLUCOMTR-MCNC: 124 MG/DL — HIGH (ref 70–99)
GLUCOSE BLDC GLUCOMTR-MCNC: 129 MG/DL — HIGH (ref 70–99)
GLUCOSE BLDC GLUCOMTR-MCNC: 152 MG/DL — HIGH (ref 70–99)
GLUCOSE BLDC GLUCOMTR-MCNC: 156 MG/DL — HIGH (ref 70–99)
GLUCOSE BLDC GLUCOMTR-MCNC: 162 MG/DL — HIGH (ref 70–99)
GLUCOSE BLDC GLUCOMTR-MCNC: 174 MG/DL — HIGH (ref 70–99)
GLUCOSE BLDC GLUCOMTR-MCNC: 176 MG/DL — HIGH (ref 70–99)
GLUCOSE BLDC GLUCOMTR-MCNC: 179 MG/DL — HIGH (ref 70–99)
GLUCOSE BLDC GLUCOMTR-MCNC: 185 MG/DL — HIGH (ref 70–99)
GLUCOSE BLDC GLUCOMTR-MCNC: 186 MG/DL — HIGH (ref 70–99)
GLUCOSE BLDC GLUCOMTR-MCNC: 191 MG/DL — HIGH (ref 70–99)
GLUCOSE BLDC GLUCOMTR-MCNC: 200 MG/DL — HIGH (ref 70–99)
GLUCOSE BLDC GLUCOMTR-MCNC: 209 MG/DL — HIGH (ref 70–99)
GLUCOSE BLDC GLUCOMTR-MCNC: 210 MG/DL — HIGH (ref 70–99)
GLUCOSE BLDC GLUCOMTR-MCNC: 75 MG/DL — SIGNIFICANT CHANGE UP (ref 70–99)
GLUCOSE BLDC GLUCOMTR-MCNC: 76 MG/DL — SIGNIFICANT CHANGE UP (ref 70–99)
GLUCOSE BLDC GLUCOMTR-MCNC: 91 MG/DL — SIGNIFICANT CHANGE UP (ref 70–99)
GLUCOSE BLDC GLUCOMTR-MCNC: 93 MG/DL — SIGNIFICANT CHANGE UP (ref 70–99)
GLUCOSE BLDC GLUCOMTR-MCNC: 93 MG/DL — SIGNIFICANT CHANGE UP (ref 70–99)
GLUCOSE FLD-MCNC: 113 MG/DL — SIGNIFICANT CHANGE UP
GLUCOSE SERPL-MCNC: 102 MG/DL — HIGH (ref 70–99)
GLUCOSE SERPL-MCNC: 103 MG/DL — HIGH (ref 70–99)
GLUCOSE SERPL-MCNC: 103 MG/DL — HIGH (ref 70–99)
GLUCOSE SERPL-MCNC: 104 MG/DL — HIGH (ref 70–99)
GLUCOSE SERPL-MCNC: 105 MG/DL — HIGH (ref 70–99)
GLUCOSE SERPL-MCNC: 108 MG/DL — HIGH (ref 70–99)
GLUCOSE SERPL-MCNC: 111 MG/DL
GLUCOSE SERPL-MCNC: 113 MG/DL — HIGH (ref 70–99)
GLUCOSE SERPL-MCNC: 114 MG/DL — HIGH (ref 70–99)
GLUCOSE SERPL-MCNC: 116 MG/DL — HIGH (ref 70–99)
GLUCOSE SERPL-MCNC: 119 MG/DL — HIGH (ref 70–99)
GLUCOSE SERPL-MCNC: 120 MG/DL — HIGH (ref 70–99)
GLUCOSE SERPL-MCNC: 124 MG/DL — HIGH (ref 70–99)
GLUCOSE SERPL-MCNC: 125 MG/DL — HIGH (ref 70–99)
GLUCOSE SERPL-MCNC: 127 MG/DL — HIGH (ref 70–99)
GLUCOSE SERPL-MCNC: 138 MG/DL — HIGH (ref 70–99)
GLUCOSE SERPL-MCNC: 139 MG/DL — HIGH (ref 70–99)
GLUCOSE SERPL-MCNC: 144 MG/DL — HIGH (ref 70–99)
GLUCOSE SERPL-MCNC: 148 MG/DL — HIGH (ref 70–99)
GLUCOSE SERPL-MCNC: 159 MG/DL — HIGH (ref 70–99)
GLUCOSE SERPL-MCNC: 164 MG/DL — HIGH (ref 70–99)
GLUCOSE SERPL-MCNC: 169 MG/DL — HIGH (ref 70–99)
GLUCOSE SERPL-MCNC: 175 MG/DL — HIGH (ref 70–99)
GLUCOSE SERPL-MCNC: 176 MG/DL — HIGH (ref 70–99)
GLUCOSE SERPL-MCNC: 179 MG/DL — HIGH (ref 70–99)
GLUCOSE SERPL-MCNC: 185 MG/DL — HIGH (ref 70–99)
GLUCOSE SERPL-MCNC: 196 MG/DL — HIGH (ref 70–99)
GLUCOSE SERPL-MCNC: 203 MG/DL — HIGH (ref 70–99)
GLUCOSE SERPL-MCNC: 61 MG/DL — LOW (ref 70–99)
GLUCOSE SERPL-MCNC: 67 MG/DL — LOW (ref 70–99)
GLUCOSE SERPL-MCNC: 75 MG/DL — SIGNIFICANT CHANGE UP (ref 70–99)
GLUCOSE SERPL-MCNC: 81 MG/DL — SIGNIFICANT CHANGE UP (ref 70–99)
GLUCOSE SERPL-MCNC: 87 MG/DL — SIGNIFICANT CHANGE UP (ref 70–99)
GLUCOSE SERPL-MCNC: 89 MG/DL — SIGNIFICANT CHANGE UP (ref 70–99)
GLUCOSE SERPL-MCNC: 92 MG/DL — SIGNIFICANT CHANGE UP (ref 70–99)
GLUCOSE SERPL-MCNC: 92 MG/DL — SIGNIFICANT CHANGE UP (ref 70–99)
GLUCOSE SERPL-MCNC: 94 MG/DL — SIGNIFICANT CHANGE UP (ref 70–99)
GLUCOSE SERPL-MCNC: 96 MG/DL — SIGNIFICANT CHANGE UP (ref 70–99)
GLUCOSE UR QL: NEGATIVE MG/DL — SIGNIFICANT CHANGE UP
GRAM STN FLD: SIGNIFICANT CHANGE UP
GRAM STN FLD: SIGNIFICANT CHANGE UP
HCO3 BLDA-SCNC: 14 MMOL/L — LOW (ref 21–28)
HCO3 BLDA-SCNC: 15 MMOL/L — LOW (ref 21–28)
HCO3 BLDA-SCNC: 15 MMOL/L — LOW (ref 21–28)
HCO3 BLDA-SCNC: 16 MMOL/L — LOW (ref 21–28)
HCO3 BLDA-SCNC: 16 MMOL/L — LOW (ref 21–28)
HCO3 BLDA-SCNC: 18 MMOL/L — LOW (ref 21–28)
HCO3 BLDV-SCNC: 16 MMOL/L — LOW (ref 22–29)
HCO3 BLDV-SCNC: 17 MMOL/L — LOW (ref 22–29)
HCO3 BLDV-SCNC: 22 MMOL/L — SIGNIFICANT CHANGE UP (ref 22–29)
HCT VFR BLD CALC: 19.1 % — CRITICAL LOW (ref 39–50)
HCT VFR BLD CALC: 19.5 % — CRITICAL LOW (ref 39–50)
HCT VFR BLD CALC: 20.3 % — CRITICAL LOW (ref 39–50)
HCT VFR BLD CALC: 21.7 % — LOW (ref 39–50)
HCT VFR BLD CALC: 22.1 % — LOW (ref 39–50)
HCT VFR BLD CALC: 22.2 % — LOW (ref 39–50)
HCT VFR BLD CALC: 22.5 % — LOW (ref 39–50)
HCT VFR BLD CALC: 22.6 % — LOW (ref 39–50)
HCT VFR BLD CALC: 22.9 % — LOW (ref 39–50)
HCT VFR BLD CALC: 23.1 % — LOW (ref 39–50)
HCT VFR BLD CALC: 23.4 % — LOW (ref 39–50)
HCT VFR BLD CALC: 23.4 % — LOW (ref 39–50)
HCT VFR BLD CALC: 23.6 % — LOW (ref 39–50)
HCT VFR BLD CALC: 25.2 % — LOW (ref 39–50)
HCT VFR BLD CALC: 25.8 % — LOW (ref 39–50)
HCT VFR BLD CALC: 26 % — LOW (ref 39–50)
HCT VFR BLD CALC: 26.6 % — LOW (ref 39–50)
HCT VFR BLD CALC: 26.7 % — LOW (ref 39–50)
HCT VFR BLD CALC: 27.3 % — LOW (ref 39–50)
HCT VFR BLD CALC: 27.5 % — LOW (ref 39–50)
HCT VFR BLD CALC: 27.7 % — LOW (ref 39–50)
HCT VFR BLD CALC: 27.7 % — LOW (ref 39–50)
HCT VFR BLD CALC: 28.1 % — LOW (ref 39–50)
HCT VFR BLD CALC: 28.7 % — LOW (ref 39–50)
HCT VFR BLD CALC: 28.8 % — LOW (ref 39–50)
HCT VFR BLD CALC: 28.9 % — LOW (ref 39–50)
HCT VFR BLD CALC: 28.9 % — LOW (ref 39–50)
HCT VFR BLD CALC: 29.7 % — LOW (ref 39–50)
HCT VFR BLD CALC: 30.5 % — LOW (ref 39–50)
HCT VFR BLD CALC: 31.5 % — LOW (ref 39–50)
HCT VFR BLD CALC: 32.1 % — LOW (ref 39–50)
HCT VFR BLD CALC: 32.2 % — LOW (ref 39–50)
HCT VFR BLD CALC: 32.4 %
HCT VFR BLD CALC: 32.9 % — LOW (ref 39–50)
HCT VFR BLD CALC: 33.3 % — LOW (ref 39–50)
HCT VFR BLD CALC: 34.3 % — LOW (ref 39–50)
HCT VFR BLD CALC: 36.7 % — LOW (ref 39–50)
HCT VFR BLD CALC: 38.2 % — LOW (ref 39–50)
HCT VFR BLD CALC: 44.9 % — SIGNIFICANT CHANGE UP (ref 39–50)
HCT VFR BLD CALC: 47.2 % — SIGNIFICANT CHANGE UP (ref 39–50)
HGB BLD-MCNC: 10 G/DL — LOW (ref 13–17)
HGB BLD-MCNC: 10.1 G/DL — LOW (ref 13–17)
HGB BLD-MCNC: 10.1 G/DL — LOW (ref 13–17)
HGB BLD-MCNC: 10.2 G/DL — LOW (ref 13–17)
HGB BLD-MCNC: 10.5 G/DL — LOW (ref 13–17)
HGB BLD-MCNC: 10.6 G/DL — LOW (ref 13–17)
HGB BLD-MCNC: 11.2 G/DL — LOW (ref 13–17)
HGB BLD-MCNC: 11.4 G/DL — LOW (ref 13–17)
HGB BLD-MCNC: 11.4 G/DL — LOW (ref 13–17)
HGB BLD-MCNC: 12.5 G/DL — LOW (ref 13–17)
HGB BLD-MCNC: 14.8 G/DL — SIGNIFICANT CHANGE UP (ref 13–17)
HGB BLD-MCNC: 14.9 G/DL — SIGNIFICANT CHANGE UP (ref 13–17)
HGB BLD-MCNC: 6.4 G/DL — CRITICAL LOW (ref 13–17)
HGB BLD-MCNC: 6.7 G/DL — CRITICAL LOW (ref 13–17)
HGB BLD-MCNC: 7 G/DL — CRITICAL LOW (ref 13–17)
HGB BLD-MCNC: 7.2 G/DL — LOW (ref 13–17)
HGB BLD-MCNC: 7.2 G/DL — LOW (ref 13–17)
HGB BLD-MCNC: 7.4 G/DL — LOW (ref 13–17)
HGB BLD-MCNC: 7.5 G/DL — LOW (ref 13–17)
HGB BLD-MCNC: 7.5 G/DL — LOW (ref 13–17)
HGB BLD-MCNC: 7.8 G/DL — LOW (ref 13–17)
HGB BLD-MCNC: 7.8 G/DL — LOW (ref 13–17)
HGB BLD-MCNC: 7.9 G/DL — LOW (ref 13–17)
HGB BLD-MCNC: 7.9 G/DL — LOW (ref 13–17)
HGB BLD-MCNC: 8 G/DL — LOW (ref 13–17)
HGB BLD-MCNC: 8.2 G/DL — LOW (ref 13–17)
HGB BLD-MCNC: 8.3 G/DL — LOW (ref 13–17)
HGB BLD-MCNC: 8.5 G/DL — LOW (ref 13–17)
HGB BLD-MCNC: 8.5 G/DL — LOW (ref 13–17)
HGB BLD-MCNC: 8.6 G/DL — LOW (ref 13–17)
HGB BLD-MCNC: 8.7 G/DL — LOW (ref 13–17)
HGB BLD-MCNC: 8.8 G/DL — LOW (ref 13–17)
HGB BLD-MCNC: 8.8 G/DL — LOW (ref 13–17)
HGB BLD-MCNC: 8.9 G/DL — LOW (ref 13–17)
HGB BLD-MCNC: 8.9 G/DL — LOW (ref 13–17)
HGB BLD-MCNC: 9 G/DL — LOW (ref 13–17)
HGB BLD-MCNC: 9.1 G/DL — LOW (ref 13–17)
HGB BLD-MCNC: 9.6 G/DL — LOW (ref 13–17)
HGB BLD-MCNC: 9.6 G/DL — LOW (ref 13–17)
HGB BLD-MCNC: 9.7 G/DL
HOROWITZ INDEX BLDA+IHG-RTO: 21 — SIGNIFICANT CHANGE UP
HOROWITZ INDEX BLDA+IHG-RTO: 35 — SIGNIFICANT CHANGE UP
HOROWITZ INDEX BLDA+IHG-RTO: 50 — SIGNIFICANT CHANGE UP
HOROWITZ INDEX BLDA+IHG-RTO: 50 — SIGNIFICANT CHANGE UP
HOROWITZ INDEX BLDV+IHG-RTO: 21 — SIGNIFICANT CHANGE UP
HOROWITZ INDEX BLDV+IHG-RTO: 21 — SIGNIFICANT CHANGE UP
HYPOCHROMIA BLD QL: SIGNIFICANT CHANGE UP
HYPOCHROMIA BLD QL: SLIGHT — SIGNIFICANT CHANGE UP
HYPOCHROMIA BLD QL: SLIGHT — SIGNIFICANT CHANGE UP
IMM GRANULOCYTES NFR BLD AUTO: 0.3 %
IMM GRANULOCYTES NFR BLD AUTO: 0.4 % — SIGNIFICANT CHANGE UP (ref 0–0.9)
IMM GRANULOCYTES NFR BLD AUTO: 0.5 % — SIGNIFICANT CHANGE UP (ref 0–0.9)
IMM GRANULOCYTES NFR BLD AUTO: 0.7 % — SIGNIFICANT CHANGE UP (ref 0–0.9)
IMM GRANULOCYTES NFR BLD AUTO: 0.8 % — SIGNIFICANT CHANGE UP (ref 0–0.9)
IMM GRANULOCYTES NFR BLD AUTO: 0.9 % — SIGNIFICANT CHANGE UP (ref 0–0.9)
IMM GRANULOCYTES NFR BLD AUTO: 1.2 % — HIGH (ref 0–0.9)
IMM GRANULOCYTES NFR BLD AUTO: 1.4 % — HIGH (ref 0–0.9)
IMM GRANULOCYTES NFR BLD AUTO: 2 % — HIGH (ref 0–0.9)
IMM GRANULOCYTES NFR BLD AUTO: 2.4 % — HIGH (ref 0–0.9)
INR BLD: 1.51 RATIO — HIGH (ref 0.88–1.16)
INR BLD: 1.56 RATIO — HIGH (ref 0.88–1.16)
INR BLD: 1.64 RATIO — HIGH (ref 0.88–1.16)
INR BLD: 1.7 RATIO — HIGH (ref 0.88–1.16)
INR BLD: 1.78 RATIO — HIGH (ref 0.88–1.16)
INR BLD: 1.84 RATIO — HIGH (ref 0.85–1.18)
INR BLD: 1.88 RATIO — HIGH (ref 0.85–1.18)
INR BLD: 2.12 RATIO — HIGH (ref 0.85–1.18)
INR BLD: 2.17 RATIO — HIGH (ref 0.85–1.18)
INR BLD: 2.31 RATIO — HIGH (ref 0.85–1.18)
INR BLD: 2.41 RATIO — HIGH (ref 0.85–1.18)
INR BLD: 2.5 RATIO — HIGH (ref 0.85–1.18)
INR BLD: 2.57 RATIO — HIGH (ref 0.85–1.18)
INR BLD: 2.58 RATIO — HIGH (ref 0.85–1.18)
INR BLD: 2.63 RATIO — HIGH (ref 0.85–1.18)
IRON SATN MFR SERPL: 182 UG/DL — HIGH (ref 65–170)
IRON SATN MFR SERPL: 74 % — HIGH (ref 20–55)
KETONES UR-MCNC: NEGATIVE MG/DL — SIGNIFICANT CHANGE UP
LACTATE SERPL-SCNC: 1.6 MMOL/L — SIGNIFICANT CHANGE UP (ref 0.7–2)
LACTATE SERPL-SCNC: 1.8 MMOL/L — SIGNIFICANT CHANGE UP (ref 0.7–2)
LACTATE SERPL-SCNC: 2.3 MMOL/L — HIGH (ref 0.7–2)
LACTATE SERPL-SCNC: 3.1 MMOL/L — HIGH (ref 0.7–2)
LACTATE SERPL-SCNC: 3.1 MMOL/L — HIGH (ref 0.7–2)
LACTATE SERPL-SCNC: 3.3 MMOL/L — HIGH (ref 0.7–2)
LACTATE SERPL-SCNC: 3.3 MMOL/L — HIGH (ref 0.7–2)
LACTATE SERPL-SCNC: 3.7 MMOL/L — HIGH (ref 0.7–2)
LACTATE SERPL-SCNC: 3.8 MMOL/L — HIGH (ref 0.7–2)
LDH SERPL L TO P-CCNC: 275 U/L — SIGNIFICANT CHANGE UP
LEUKOCYTE ESTERASE UR-ACNC: ABNORMAL
LIDOCAIN IGE QN: 133 U/L — SIGNIFICANT CHANGE UP (ref 73–393)
LIDOCAIN IGE QN: 87 U/L — SIGNIFICANT CHANGE UP (ref 73–393)
LYMPHOCYTES # BLD AUTO: 0.11 K/UL — LOW (ref 1–3.3)
LYMPHOCYTES # BLD AUTO: 0.13 K/UL — LOW (ref 1–3.3)
LYMPHOCYTES # BLD AUTO: 0.63 K/UL — LOW (ref 1–3.3)
LYMPHOCYTES # BLD AUTO: 0.67 K/UL — LOW (ref 1–3.3)
LYMPHOCYTES # BLD AUTO: 0.68 K/UL — LOW (ref 1–3.3)
LYMPHOCYTES # BLD AUTO: 0.69 K/UL — LOW (ref 1–3.3)
LYMPHOCYTES # BLD AUTO: 0.72 K/UL — LOW (ref 1–3.3)
LYMPHOCYTES # BLD AUTO: 0.77 K/UL — LOW (ref 1–3.3)
LYMPHOCYTES # BLD AUTO: 0.96 K/UL
LYMPHOCYTES # BLD AUTO: 1 % — LOW (ref 13–44)
LYMPHOCYTES # BLD AUTO: 1.04 K/UL — SIGNIFICANT CHANGE UP (ref 1–3.3)
LYMPHOCYTES # BLD AUTO: 1.05 K/UL — SIGNIFICANT CHANGE UP (ref 1–3.3)
LYMPHOCYTES # BLD AUTO: 1.06 K/UL — SIGNIFICANT CHANGE UP (ref 1–3.3)
LYMPHOCYTES # BLD AUTO: 1.79 K/UL — SIGNIFICANT CHANGE UP (ref 1–3.3)
LYMPHOCYTES # BLD AUTO: 10.4 % — LOW (ref 13–44)
LYMPHOCYTES # BLD AUTO: 12 % — LOW (ref 13–44)
LYMPHOCYTES # BLD AUTO: 14 % — SIGNIFICANT CHANGE UP (ref 13–44)
LYMPHOCYTES # BLD AUTO: 18.4 % — SIGNIFICANT CHANGE UP (ref 13–44)
LYMPHOCYTES # BLD AUTO: 2.3 % — LOW (ref 13–44)
LYMPHOCYTES # BLD AUTO: 21.4 % — SIGNIFICANT CHANGE UP (ref 13–44)
LYMPHOCYTES # BLD AUTO: 22.2 % — SIGNIFICANT CHANGE UP (ref 13–44)
LYMPHOCYTES # BLD AUTO: 5.5 % — LOW (ref 13–44)
LYMPHOCYTES # BLD AUTO: 5.8 % — LOW (ref 13–44)
LYMPHOCYTES # BLD AUTO: 5.9 % — LOW (ref 13–44)
LYMPHOCYTES # BLD AUTO: 8.2 % — LOW (ref 13–44)
LYMPHOCYTES # FLD: 2 % — SIGNIFICANT CHANGE UP
LYMPHOCYTES NFR BLD AUTO: 28.3 %
MAGNESIUM SERPL-MCNC: 1.3 MG/DL — LOW (ref 1.6–2.6)
MAGNESIUM SERPL-MCNC: 1.3 MG/DL — LOW (ref 1.6–2.6)
MAGNESIUM SERPL-MCNC: 1.5 MG/DL — LOW (ref 1.6–2.6)
MAGNESIUM SERPL-MCNC: 1.6 MG/DL — SIGNIFICANT CHANGE UP (ref 1.6–2.6)
MAGNESIUM SERPL-MCNC: 1.7 MG/DL — SIGNIFICANT CHANGE UP (ref 1.6–2.6)
MAGNESIUM SERPL-MCNC: 1.8 MG/DL — SIGNIFICANT CHANGE UP (ref 1.6–2.6)
MAGNESIUM SERPL-MCNC: 1.9 MG/DL — SIGNIFICANT CHANGE UP (ref 1.6–2.6)
MAGNESIUM SERPL-MCNC: 2 MG/DL — SIGNIFICANT CHANGE UP (ref 1.6–2.6)
MAGNESIUM SERPL-MCNC: 2.1 MG/DL — SIGNIFICANT CHANGE UP (ref 1.6–2.6)
MAGNESIUM SERPL-MCNC: 2.3 MG/DL — SIGNIFICANT CHANGE UP (ref 1.6–2.6)
MAN DIFF?: NORMAL
MANUAL SMEAR VERIFICATION: SIGNIFICANT CHANGE UP
MCHC RBC-ENTMCNC: 23.3 PG
MCHC RBC-ENTMCNC: 23.7 PG — LOW (ref 27–34)
MCHC RBC-ENTMCNC: 23.8 PG — LOW (ref 27–34)
MCHC RBC-ENTMCNC: 24 PG — LOW (ref 27–34)
MCHC RBC-ENTMCNC: 24.2 PG — LOW (ref 27–34)
MCHC RBC-ENTMCNC: 25.2 PG — LOW (ref 27–34)
MCHC RBC-ENTMCNC: 25.8 PG — LOW (ref 27–34)
MCHC RBC-ENTMCNC: 26 PG — LOW (ref 27–34)
MCHC RBC-ENTMCNC: 26.2 PG — LOW (ref 27–34)
MCHC RBC-ENTMCNC: 26.3 PG — LOW (ref 27–34)
MCHC RBC-ENTMCNC: 26.4 PG — LOW (ref 27–34)
MCHC RBC-ENTMCNC: 26.5 PG — LOW (ref 27–34)
MCHC RBC-ENTMCNC: 26.7 PG — LOW (ref 27–34)
MCHC RBC-ENTMCNC: 27.2 PG — SIGNIFICANT CHANGE UP (ref 27–34)
MCHC RBC-ENTMCNC: 27.2 PG — SIGNIFICANT CHANGE UP (ref 27–34)
MCHC RBC-ENTMCNC: 27.5 PG — SIGNIFICANT CHANGE UP (ref 27–34)
MCHC RBC-ENTMCNC: 27.5 PG — SIGNIFICANT CHANGE UP (ref 27–34)
MCHC RBC-ENTMCNC: 27.6 PG — SIGNIFICANT CHANGE UP (ref 27–34)
MCHC RBC-ENTMCNC: 27.7 PG — SIGNIFICANT CHANGE UP (ref 27–34)
MCHC RBC-ENTMCNC: 27.8 PG — SIGNIFICANT CHANGE UP (ref 27–34)
MCHC RBC-ENTMCNC: 27.8 PG — SIGNIFICANT CHANGE UP (ref 27–34)
MCHC RBC-ENTMCNC: 27.9 PG — SIGNIFICANT CHANGE UP (ref 27–34)
MCHC RBC-ENTMCNC: 28 PG — SIGNIFICANT CHANGE UP (ref 27–34)
MCHC RBC-ENTMCNC: 28.2 PG — SIGNIFICANT CHANGE UP (ref 27–34)
MCHC RBC-ENTMCNC: 28.2 PG — SIGNIFICANT CHANGE UP (ref 27–34)
MCHC RBC-ENTMCNC: 28.5 PG — SIGNIFICANT CHANGE UP (ref 27–34)
MCHC RBC-ENTMCNC: 28.6 PG — SIGNIFICANT CHANGE UP (ref 27–34)
MCHC RBC-ENTMCNC: 28.6 PG — SIGNIFICANT CHANGE UP (ref 27–34)
MCHC RBC-ENTMCNC: 28.7 PG — SIGNIFICANT CHANGE UP (ref 27–34)
MCHC RBC-ENTMCNC: 28.8 PG — SIGNIFICANT CHANGE UP (ref 27–34)
MCHC RBC-ENTMCNC: 29 PG — SIGNIFICANT CHANGE UP (ref 27–34)
MCHC RBC-ENTMCNC: 29.2 PG — SIGNIFICANT CHANGE UP (ref 27–34)
MCHC RBC-ENTMCNC: 29.2 PG — SIGNIFICANT CHANGE UP (ref 27–34)
MCHC RBC-ENTMCNC: 29.8 GM/DL — LOW (ref 32–36)
MCHC RBC-ENTMCNC: 29.8 PG — SIGNIFICANT CHANGE UP (ref 27–34)
MCHC RBC-ENTMCNC: 29.9 GM/DL
MCHC RBC-ENTMCNC: 30.6 GM/DL — LOW (ref 32–36)
MCHC RBC-ENTMCNC: 30.9 GM/DL — LOW (ref 32–36)
MCHC RBC-ENTMCNC: 31 GM/DL — LOW (ref 32–36)
MCHC RBC-ENTMCNC: 31.1 GM/DL — LOW (ref 32–36)
MCHC RBC-ENTMCNC: 31.3 GM/DL — LOW (ref 32–36)
MCHC RBC-ENTMCNC: 31.3 GM/DL — LOW (ref 32–36)
MCHC RBC-ENTMCNC: 31.4 GM/DL — LOW (ref 32–36)
MCHC RBC-ENTMCNC: 31.4 GM/DL — LOW (ref 32–36)
MCHC RBC-ENTMCNC: 31.5 GM/DL — LOW (ref 32–36)
MCHC RBC-ENTMCNC: 31.6 GM/DL — LOW (ref 32–36)
MCHC RBC-ENTMCNC: 31.8 GM/DL — LOW (ref 32–36)
MCHC RBC-ENTMCNC: 31.9 GM/DL — LOW (ref 32–36)
MCHC RBC-ENTMCNC: 32 GM/DL — SIGNIFICANT CHANGE UP (ref 32–36)
MCHC RBC-ENTMCNC: 32.1 GM/DL — SIGNIFICANT CHANGE UP (ref 32–36)
MCHC RBC-ENTMCNC: 32.1 GM/DL — SIGNIFICANT CHANGE UP (ref 32–36)
MCHC RBC-ENTMCNC: 32.2 GM/DL — SIGNIFICANT CHANGE UP (ref 32–36)
MCHC RBC-ENTMCNC: 32.7 GM/DL — SIGNIFICANT CHANGE UP (ref 32–36)
MCHC RBC-ENTMCNC: 32.8 GM/DL — SIGNIFICANT CHANGE UP (ref 32–36)
MCHC RBC-ENTMCNC: 32.9 GM/DL — SIGNIFICANT CHANGE UP (ref 32–36)
MCHC RBC-ENTMCNC: 33 GM/DL — SIGNIFICANT CHANGE UP (ref 32–36)
MCHC RBC-ENTMCNC: 33.2 GM/DL — SIGNIFICANT CHANGE UP (ref 32–36)
MCHC RBC-ENTMCNC: 33.3 GM/DL — SIGNIFICANT CHANGE UP (ref 32–36)
MCHC RBC-ENTMCNC: 33.6 GM/DL — SIGNIFICANT CHANGE UP (ref 32–36)
MCHC RBC-ENTMCNC: 34.2 GM/DL — SIGNIFICANT CHANGE UP (ref 32–36)
MCHC RBC-ENTMCNC: 34.7 GM/DL — SIGNIFICANT CHANGE UP (ref 32–36)
MCHC RBC-ENTMCNC: 34.7 GM/DL — SIGNIFICANT CHANGE UP (ref 32–36)
MCHC RBC-ENTMCNC: 35.1 GM/DL — SIGNIFICANT CHANGE UP (ref 32–36)
MCHC RBC-ENTMCNC: 35.1 GM/DL — SIGNIFICANT CHANGE UP (ref 32–36)
MCHC RBC-ENTMCNC: 35.2 GM/DL — SIGNIFICANT CHANGE UP (ref 32–36)
MCHC RBC-ENTMCNC: 35.3 GM/DL — SIGNIFICANT CHANGE UP (ref 32–36)
MCHC RBC-ENTMCNC: 35.5 GM/DL — SIGNIFICANT CHANGE UP (ref 32–36)
MCHC RBC-ENTMCNC: 35.6 GM/DL — SIGNIFICANT CHANGE UP (ref 32–36)
MCHC RBC-ENTMCNC: 35.9 GM/DL — SIGNIFICANT CHANGE UP (ref 32–36)
MCHC RBC-ENTMCNC: 35.9 GM/DL — SIGNIFICANT CHANGE UP (ref 32–36)
MCV RBC AUTO: 74.4 FL — LOW (ref 80–100)
MCV RBC AUTO: 75.1 FL — LOW (ref 80–100)
MCV RBC AUTO: 75.4 FL — LOW (ref 80–100)
MCV RBC AUTO: 76.2 FL — LOW (ref 80–100)
MCV RBC AUTO: 76.3 FL — LOW (ref 80–100)
MCV RBC AUTO: 76.4 FL — LOW (ref 80–100)
MCV RBC AUTO: 76.5 FL — LOW (ref 80–100)
MCV RBC AUTO: 76.5 FL — LOW (ref 80–100)
MCV RBC AUTO: 76.7 FL — LOW (ref 80–100)
MCV RBC AUTO: 76.7 FL — LOW (ref 80–100)
MCV RBC AUTO: 77.6 FL — LOW (ref 80–100)
MCV RBC AUTO: 77.7 FL
MCV RBC AUTO: 78 FL — LOW (ref 80–100)
MCV RBC AUTO: 78.2 FL — LOW (ref 80–100)
MCV RBC AUTO: 78.3 FL — LOW (ref 80–100)
MCV RBC AUTO: 78.7 FL — LOW (ref 80–100)
MCV RBC AUTO: 79.2 FL — LOW (ref 80–100)
MCV RBC AUTO: 79.5 FL — LOW (ref 80–100)
MCV RBC AUTO: 79.6 FL — LOW (ref 80–100)
MCV RBC AUTO: 79.6 FL — LOW (ref 80–100)
MCV RBC AUTO: 79.9 FL — LOW (ref 80–100)
MCV RBC AUTO: 80.1 FL — SIGNIFICANT CHANGE UP (ref 80–100)
MCV RBC AUTO: 80.3 FL — SIGNIFICANT CHANGE UP (ref 80–100)
MCV RBC AUTO: 86.9 FL — SIGNIFICANT CHANGE UP (ref 80–100)
MCV RBC AUTO: 88 FL — SIGNIFICANT CHANGE UP (ref 80–100)
MCV RBC AUTO: 88.3 FL — SIGNIFICANT CHANGE UP (ref 80–100)
MCV RBC AUTO: 88.3 FL — SIGNIFICANT CHANGE UP (ref 80–100)
MCV RBC AUTO: 88.7 FL — SIGNIFICANT CHANGE UP (ref 80–100)
MCV RBC AUTO: 88.9 FL — SIGNIFICANT CHANGE UP (ref 80–100)
MCV RBC AUTO: 89 FL — SIGNIFICANT CHANGE UP (ref 80–100)
MCV RBC AUTO: 89.2 FL — SIGNIFICANT CHANGE UP (ref 80–100)
MCV RBC AUTO: 89.3 FL — SIGNIFICANT CHANGE UP (ref 80–100)
MCV RBC AUTO: 89.6 FL — SIGNIFICANT CHANGE UP (ref 80–100)
MCV RBC AUTO: 90.2 FL — SIGNIFICANT CHANGE UP (ref 80–100)
MCV RBC AUTO: 90.8 FL — SIGNIFICANT CHANGE UP (ref 80–100)
MCV RBC AUTO: 91.1 FL — SIGNIFICANT CHANGE UP (ref 80–100)
MCV RBC AUTO: 91.5 FL — SIGNIFICANT CHANGE UP (ref 80–100)
MCV RBC AUTO: 91.6 FL — SIGNIFICANT CHANGE UP (ref 80–100)
MCV RBC AUTO: 92.5 FL — SIGNIFICANT CHANGE UP (ref 80–100)
MCV RBC AUTO: 93.4 FL — SIGNIFICANT CHANGE UP (ref 80–100)
MELD SCORE WITH DIALYSIS: 29 POINTS — SIGNIFICANT CHANGE UP
MELD SCORE WITH DIALYSIS: 33 POINTS — SIGNIFICANT CHANGE UP
MELD SCORE WITH DIALYSIS: 33 POINTS — SIGNIFICANT CHANGE UP
MELD SCORE WITH DIALYSIS: SIGNIFICANT CHANGE UP POINTS
MELD SCORE WITHOUT DIALYSIS: 21 POINTS — SIGNIFICANT CHANGE UP
MELD SCORE WITHOUT DIALYSIS: 25 POINTS — SIGNIFICANT CHANGE UP
MELD SCORE WITHOUT DIALYSIS: 25 POINTS — SIGNIFICANT CHANGE UP
MELD SCORE WITHOUT DIALYSIS: SIGNIFICANT CHANGE UP POINTS
METAMYELOCYTES # FLD: 1 % — HIGH (ref 0–0)
METHOD TYPE: SIGNIFICANT CHANGE UP
MICROCYTES BLD QL: SLIGHT — SIGNIFICANT CHANGE UP
MONOCYTES # BLD AUTO: 0 K/UL — SIGNIFICANT CHANGE UP (ref 0–0.9)
MONOCYTES # BLD AUTO: 0.11 K/UL — SIGNIFICANT CHANGE UP (ref 0–0.9)
MONOCYTES # BLD AUTO: 0.35 K/UL
MONOCYTES # BLD AUTO: 0.5 K/UL — SIGNIFICANT CHANGE UP (ref 0–0.9)
MONOCYTES # BLD AUTO: 0.59 K/UL — SIGNIFICANT CHANGE UP (ref 0–0.9)
MONOCYTES # BLD AUTO: 0.6 K/UL — SIGNIFICANT CHANGE UP (ref 0–0.9)
MONOCYTES # BLD AUTO: 0.62 K/UL — SIGNIFICANT CHANGE UP (ref 0–0.9)
MONOCYTES # BLD AUTO: 0.65 K/UL — SIGNIFICANT CHANGE UP (ref 0–0.9)
MONOCYTES # BLD AUTO: 0.74 K/UL — SIGNIFICANT CHANGE UP (ref 0–0.9)
MONOCYTES # BLD AUTO: 0.75 K/UL — SIGNIFICANT CHANGE UP (ref 0–0.9)
MONOCYTES # BLD AUTO: 0.85 K/UL — SIGNIFICANT CHANGE UP (ref 0–0.9)
MONOCYTES # BLD AUTO: 0.91 K/UL — HIGH (ref 0–0.9)
MONOCYTES # BLD AUTO: 1.1 K/UL — HIGH (ref 0–0.9)
MONOCYTES NFR BLD AUTO: 0 % — LOW (ref 2–14)
MONOCYTES NFR BLD AUTO: 1.9 % — LOW (ref 2–14)
MONOCYTES NFR BLD AUTO: 10.1 % — SIGNIFICANT CHANGE UP (ref 2–14)
MONOCYTES NFR BLD AUTO: 10.2 % — SIGNIFICANT CHANGE UP (ref 2–14)
MONOCYTES NFR BLD AUTO: 10.3 %
MONOCYTES NFR BLD AUTO: 10.7 % — SIGNIFICANT CHANGE UP (ref 2–14)
MONOCYTES NFR BLD AUTO: 12.4 % — SIGNIFICANT CHANGE UP (ref 2–14)
MONOCYTES NFR BLD AUTO: 12.5 % — SIGNIFICANT CHANGE UP (ref 2–14)
MONOCYTES NFR BLD AUTO: 5.6 % — SIGNIFICANT CHANGE UP (ref 2–14)
MONOCYTES NFR BLD AUTO: 7.7 % — SIGNIFICANT CHANGE UP (ref 2–14)
MONOCYTES NFR BLD AUTO: 7.8 % — SIGNIFICANT CHANGE UP (ref 2–14)
MONOCYTES NFR BLD AUTO: 7.9 % — SIGNIFICANT CHANGE UP (ref 2–14)
MONOCYTES NFR BLD AUTO: 7.9 % — SIGNIFICANT CHANGE UP (ref 2–14)
MONOS+MACROS # FLD: 3 % — SIGNIFICANT CHANGE UP
MRSA PCR RESULT.: DETECTED
MRSA PCR RESULT.: DETECTED
MRSA PCR RESULT.: SIGNIFICANT CHANGE UP
NEUTROPHILS # BLD AUTO: 1.73 K/UL
NEUTROPHILS # BLD AUTO: 10.03 K/UL — HIGH (ref 1.8–7.4)
NEUTROPHILS # BLD AUTO: 10.3 K/UL — HIGH (ref 1.8–7.4)
NEUTROPHILS # BLD AUTO: 11.55 K/UL — HIGH (ref 1.8–7.4)
NEUTROPHILS # BLD AUTO: 2.63 K/UL — SIGNIFICANT CHANGE UP (ref 1.8–7.4)
NEUTROPHILS # BLD AUTO: 3.32 K/UL — SIGNIFICANT CHANGE UP (ref 1.8–7.4)
NEUTROPHILS # BLD AUTO: 3.81 K/UL — SIGNIFICANT CHANGE UP (ref 1.8–7.4)
NEUTROPHILS # BLD AUTO: 5.17 K/UL — SIGNIFICANT CHANGE UP (ref 1.8–7.4)
NEUTROPHILS # BLD AUTO: 5.26 K/UL — SIGNIFICANT CHANGE UP (ref 1.8–7.4)
NEUTROPHILS # BLD AUTO: 5.46 K/UL — SIGNIFICANT CHANGE UP (ref 1.8–7.4)
NEUTROPHILS # BLD AUTO: 5.53 K/UL — SIGNIFICANT CHANGE UP (ref 1.8–7.4)
NEUTROPHILS # BLD AUTO: 6.3 K/UL — SIGNIFICANT CHANGE UP (ref 1.8–7.4)
NEUTROPHILS # BLD AUTO: 9.55 K/UL — HIGH (ref 1.8–7.4)
NEUTROPHILS NFR BLD AUTO: 51 %
NEUTROPHILS NFR BLD AUTO: 55.7 % — SIGNIFICANT CHANGE UP (ref 43–77)
NEUTROPHILS NFR BLD AUTO: 57.5 % — SIGNIFICANT CHANGE UP (ref 43–77)
NEUTROPHILS NFR BLD AUTO: 63.7 % — SIGNIFICANT CHANGE UP (ref 43–77)
NEUTROPHILS NFR BLD AUTO: 66.2 % — SIGNIFICANT CHANGE UP (ref 43–77)
NEUTROPHILS NFR BLD AUTO: 69.5 % — SIGNIFICANT CHANGE UP (ref 43–77)
NEUTROPHILS NFR BLD AUTO: 80.5 % — HIGH (ref 43–77)
NEUTROPHILS NFR BLD AUTO: 82.2 % — HIGH (ref 43–77)
NEUTROPHILS NFR BLD AUTO: 82.5 % — HIGH (ref 43–77)
NEUTROPHILS NFR BLD AUTO: 82.9 % — HIGH (ref 43–77)
NEUTROPHILS NFR BLD AUTO: 86.9 % — HIGH (ref 43–77)
NEUTROPHILS NFR BLD AUTO: 95 % — HIGH (ref 43–77)
NEUTROPHILS NFR BLD AUTO: 95.1 % — HIGH (ref 43–77)
NEUTROPHILS-BODY FLUID: 95 % — SIGNIFICANT CHANGE UP
NEUTS BAND # BLD: 1 % — SIGNIFICANT CHANGE UP (ref 0–8)
NITRITE UR-MCNC: NEGATIVE — SIGNIFICANT CHANGE UP
NRBC # BLD: 0 /100 WBCS — SIGNIFICANT CHANGE UP (ref 0–0)
NRBC # BLD: 0 /100 — SIGNIFICANT CHANGE UP (ref 0–0)
NT-PROBNP SERPL-SCNC: 1310 PG/ML — HIGH (ref 0–450)
OB PNL STL: POSITIVE
ORGANISM # SPEC MICROSCOPIC CNT: SIGNIFICANT CHANGE UP
ORGANISM # SPEC MICROSCOPIC CNT: SIGNIFICANT CHANGE UP
OSMOLALITY SERPL: 281 MOSMOL/KG — SIGNIFICANT CHANGE UP (ref 280–301)
OSMOLALITY UR: 373 MOS/KG — SIGNIFICANT CHANGE UP (ref 50–1200)
OVALOCYTES BLD QL SMEAR: SLIGHT — SIGNIFICANT CHANGE UP
PCO2 BLDA: 22 MMHG — LOW (ref 35–48)
PCO2 BLDA: 23 MMHG — LOW (ref 35–48)
PCO2 BLDA: 23 MMHG — LOW (ref 35–48)
PCO2 BLDA: 26 MMHG — LOW (ref 35–48)
PCO2 BLDA: 28 MMHG — LOW (ref 35–48)
PCO2 BLDA: 29 MMHG — LOW (ref 35–48)
PCO2 BLDV: 28 MMHG — LOW (ref 42–55)
PCO2 BLDV: 34 MMHG — LOW (ref 42–55)
PCO2 BLDV: 41 MMHG — LOW (ref 42–55)
PH BLDA: 7.34 — LOW (ref 7.35–7.45)
PH BLDA: 7.37 — SIGNIFICANT CHANGE UP (ref 7.35–7.45)
PH BLDA: 7.41 — SIGNIFICANT CHANGE UP (ref 7.35–7.45)
PH BLDA: 7.41 — SIGNIFICANT CHANGE UP (ref 7.35–7.45)
PH BLDA: 7.42 — SIGNIFICANT CHANGE UP (ref 7.35–7.45)
PH BLDA: 7.45 — SIGNIFICANT CHANGE UP (ref 7.35–7.45)
PH BLDV: 7.27 — LOW (ref 7.32–7.43)
PH BLDV: 7.34 — SIGNIFICANT CHANGE UP (ref 7.32–7.43)
PH BLDV: 7.4 — SIGNIFICANT CHANGE UP (ref 7.32–7.43)
PH UR: 5 — SIGNIFICANT CHANGE UP (ref 5–8)
PHOSPHATE SERPL-MCNC: 1.8 MG/DL — LOW (ref 2.5–4.5)
PHOSPHATE SERPL-MCNC: 1.9 MG/DL — LOW (ref 2.5–4.5)
PHOSPHATE SERPL-MCNC: 2 MG/DL — LOW (ref 2.5–4.5)
PHOSPHATE SERPL-MCNC: 2.2 MG/DL — LOW (ref 2.5–4.5)
PHOSPHATE SERPL-MCNC: 2.2 MG/DL — LOW (ref 2.5–4.5)
PHOSPHATE SERPL-MCNC: 2.3 MG/DL — LOW (ref 2.5–4.5)
PHOSPHATE SERPL-MCNC: 2.5 MG/DL — SIGNIFICANT CHANGE UP (ref 2.5–4.5)
PHOSPHATE SERPL-MCNC: 2.5 MG/DL — SIGNIFICANT CHANGE UP (ref 2.5–4.5)
PHOSPHATE SERPL-MCNC: 2.6 MG/DL — SIGNIFICANT CHANGE UP (ref 2.5–4.5)
PHOSPHATE SERPL-MCNC: 2.7 MG/DL — SIGNIFICANT CHANGE UP (ref 2.5–4.5)
PHOSPHATE SERPL-MCNC: 2.7 MG/DL — SIGNIFICANT CHANGE UP (ref 2.5–4.5)
PHOSPHATE SERPL-MCNC: 2.8 MG/DL — SIGNIFICANT CHANGE UP (ref 2.5–4.5)
PHOSPHATE SERPL-MCNC: 2.9 MG/DL — SIGNIFICANT CHANGE UP (ref 2.5–4.5)
PHOSPHATE SERPL-MCNC: 3 MG/DL — SIGNIFICANT CHANGE UP (ref 2.5–4.5)
PHOSPHATE SERPL-MCNC: 3 MG/DL — SIGNIFICANT CHANGE UP (ref 2.5–4.5)
PHOSPHATE SERPL-MCNC: 3.1 MG/DL — SIGNIFICANT CHANGE UP (ref 2.5–4.5)
PHOSPHATE SERPL-MCNC: 3.2 MG/DL — SIGNIFICANT CHANGE UP (ref 2.5–4.5)
PHOSPHATE SERPL-MCNC: 3.3 MG/DL — SIGNIFICANT CHANGE UP (ref 2.5–4.5)
PHOSPHATE SERPL-MCNC: 3.6 MG/DL — SIGNIFICANT CHANGE UP (ref 2.5–4.5)
PHOSPHATE SERPL-MCNC: 4 MG/DL — SIGNIFICANT CHANGE UP (ref 2.5–4.5)
PHOSPHATE SERPL-MCNC: 7.5 MG/DL — HIGH (ref 2.5–4.5)
PLAT MORPH BLD: NORMAL — SIGNIFICANT CHANGE UP
PLATELET # BLD AUTO: 106 K/UL — LOW (ref 150–400)
PLATELET # BLD AUTO: 107 K/UL — LOW (ref 150–400)
PLATELET # BLD AUTO: 107 K/UL — LOW (ref 150–400)
PLATELET # BLD AUTO: 108 K/UL — LOW (ref 150–400)
PLATELET # BLD AUTO: 109 K/UL — LOW (ref 150–400)
PLATELET # BLD AUTO: 109 K/UL — LOW (ref 150–400)
PLATELET # BLD AUTO: 110 K/UL — LOW (ref 150–400)
PLATELET # BLD AUTO: 116 K/UL — LOW (ref 150–400)
PLATELET # BLD AUTO: 119 K/UL — LOW (ref 150–400)
PLATELET # BLD AUTO: 120 K/UL — LOW (ref 150–400)
PLATELET # BLD AUTO: 123 K/UL — LOW (ref 150–400)
PLATELET # BLD AUTO: 130 K/UL — LOW (ref 150–400)
PLATELET # BLD AUTO: 130 K/UL — LOW (ref 150–400)
PLATELET # BLD AUTO: 137 K/UL — LOW (ref 150–400)
PLATELET # BLD AUTO: 138 K/UL — LOW (ref 150–400)
PLATELET # BLD AUTO: 145 K/UL — LOW (ref 150–400)
PLATELET # BLD AUTO: 146 K/UL
PLATELET # BLD AUTO: 146 K/UL — LOW (ref 150–400)
PLATELET # BLD AUTO: 148 K/UL — LOW (ref 150–400)
PLATELET # BLD AUTO: 167 K/UL — SIGNIFICANT CHANGE UP (ref 150–400)
PLATELET # BLD AUTO: 169 K/UL — SIGNIFICANT CHANGE UP (ref 150–400)
PLATELET # BLD AUTO: 171 K/UL — SIGNIFICANT CHANGE UP (ref 150–400)
PLATELET # BLD AUTO: 180 K/UL — SIGNIFICANT CHANGE UP (ref 150–400)
PLATELET # BLD AUTO: 181 K/UL — SIGNIFICANT CHANGE UP (ref 150–400)
PLATELET # BLD AUTO: 31 K/UL — LOW (ref 150–400)
PLATELET # BLD AUTO: 53 K/UL — LOW (ref 150–400)
PLATELET # BLD AUTO: 56 K/UL — LOW (ref 150–400)
PLATELET # BLD AUTO: 57 K/UL — LOW (ref 150–400)
PLATELET # BLD AUTO: 60 K/UL — LOW (ref 150–400)
PLATELET # BLD AUTO: 65 K/UL — LOW (ref 150–400)
PLATELET # BLD AUTO: 72 K/UL — LOW (ref 150–400)
PLATELET # BLD AUTO: 75 K/UL — LOW (ref 150–400)
PLATELET # BLD AUTO: 77 K/UL — LOW (ref 150–400)
PLATELET # BLD AUTO: 82 K/UL — LOW (ref 150–400)
PLATELET # BLD AUTO: 86 K/UL — LOW (ref 150–400)
PLATELET # BLD AUTO: 91 K/UL — LOW (ref 150–400)
PLATELET # BLD AUTO: 93 K/UL — LOW (ref 150–400)
PLATELET # BLD AUTO: 95 K/UL — LOW (ref 150–400)
PLATELET COUNT - ESTIMATE: ABNORMAL
PLATELET COUNT - ESTIMATE: ABNORMAL
PO2 BLDA: 138 MMHG — HIGH (ref 83–108)
PO2 BLDA: 146 MMHG — HIGH (ref 83–108)
PO2 BLDA: 152 MMHG — HIGH (ref 83–108)
PO2 BLDA: 167 MMHG — HIGH (ref 83–108)
PO2 BLDA: 177 MMHG — HIGH (ref 83–108)
PO2 BLDA: 97 MMHG — SIGNIFICANT CHANGE UP (ref 83–108)
PO2 BLDV: 28 MMHG — SIGNIFICANT CHANGE UP
PO2 BLDV: 54 MMHG — SIGNIFICANT CHANGE UP
PO2 BLDV: 55 MMHG — SIGNIFICANT CHANGE UP
POIKILOCYTOSIS BLD QL AUTO: SIGNIFICANT CHANGE UP
POIKILOCYTOSIS BLD QL AUTO: SLIGHT — SIGNIFICANT CHANGE UP
POIKILOCYTOSIS BLD QL AUTO: SLIGHT — SIGNIFICANT CHANGE UP
POLYCHROMASIA BLD QL SMEAR: SLIGHT — SIGNIFICANT CHANGE UP
POLYCHROMASIA BLD QL SMEAR: SLIGHT — SIGNIFICANT CHANGE UP
POTASSIUM SERPL-MCNC: 3.3 MMOL/L — LOW (ref 3.5–5.3)
POTASSIUM SERPL-MCNC: 3.4 MMOL/L — LOW (ref 3.5–5.3)
POTASSIUM SERPL-MCNC: 3.5 MMOL/L — SIGNIFICANT CHANGE UP (ref 3.5–5.3)
POTASSIUM SERPL-MCNC: 3.6 MMOL/L — SIGNIFICANT CHANGE UP (ref 3.5–5.3)
POTASSIUM SERPL-MCNC: 3.7 MMOL/L — SIGNIFICANT CHANGE UP (ref 3.5–5.3)
POTASSIUM SERPL-MCNC: 3.8 MMOL/L — SIGNIFICANT CHANGE UP (ref 3.5–5.3)
POTASSIUM SERPL-MCNC: 3.9 MMOL/L — SIGNIFICANT CHANGE UP (ref 3.5–5.3)
POTASSIUM SERPL-MCNC: 4 MMOL/L — SIGNIFICANT CHANGE UP (ref 3.5–5.3)
POTASSIUM SERPL-MCNC: 4.1 MMOL/L — SIGNIFICANT CHANGE UP (ref 3.5–5.3)
POTASSIUM SERPL-MCNC: 4.2 MMOL/L — SIGNIFICANT CHANGE UP (ref 3.5–5.3)
POTASSIUM SERPL-MCNC: 4.3 MMOL/L — SIGNIFICANT CHANGE UP (ref 3.5–5.3)
POTASSIUM SERPL-MCNC: 4.3 MMOL/L — SIGNIFICANT CHANGE UP (ref 3.5–5.3)
POTASSIUM SERPL-MCNC: 4.4 MMOL/L — SIGNIFICANT CHANGE UP (ref 3.5–5.3)
POTASSIUM SERPL-MCNC: 4.5 MMOL/L — SIGNIFICANT CHANGE UP (ref 3.5–5.3)
POTASSIUM SERPL-MCNC: 4.5 MMOL/L — SIGNIFICANT CHANGE UP (ref 3.5–5.3)
POTASSIUM SERPL-MCNC: 4.6 MMOL/L — SIGNIFICANT CHANGE UP (ref 3.5–5.3)
POTASSIUM SERPL-MCNC: 4.9 MMOL/L — SIGNIFICANT CHANGE UP (ref 3.5–5.3)
POTASSIUM SERPL-MCNC: 5 MMOL/L — SIGNIFICANT CHANGE UP (ref 3.5–5.3)
POTASSIUM SERPL-MCNC: 5 MMOL/L — SIGNIFICANT CHANGE UP (ref 3.5–5.3)
POTASSIUM SERPL-MCNC: 5.2 MMOL/L — SIGNIFICANT CHANGE UP (ref 3.5–5.3)
POTASSIUM SERPL-MCNC: 5.8 MMOL/L — HIGH (ref 3.5–5.3)
POTASSIUM SERPL-MCNC: 6 MMOL/L — HIGH (ref 3.5–5.3)
POTASSIUM SERPL-MCNC: 6.5 MMOL/L — CRITICAL HIGH (ref 3.5–5.3)
POTASSIUM SERPL-MCNC: 6.6 MMOL/L — CRITICAL HIGH (ref 3.5–5.3)
POTASSIUM SERPL-MCNC: SIGNIFICANT CHANGE UP MMOL/L (ref 3.5–5.3)
POTASSIUM SERPL-SCNC: 3.3 MMOL/L — LOW (ref 3.5–5.3)
POTASSIUM SERPL-SCNC: 3.4 MMOL/L — LOW (ref 3.5–5.3)
POTASSIUM SERPL-SCNC: 3.5 MMOL/L — SIGNIFICANT CHANGE UP (ref 3.5–5.3)
POTASSIUM SERPL-SCNC: 3.6 MMOL/L — SIGNIFICANT CHANGE UP (ref 3.5–5.3)
POTASSIUM SERPL-SCNC: 3.7 MMOL/L — SIGNIFICANT CHANGE UP (ref 3.5–5.3)
POTASSIUM SERPL-SCNC: 3.8 MMOL/L — SIGNIFICANT CHANGE UP (ref 3.5–5.3)
POTASSIUM SERPL-SCNC: 3.9 MMOL/L — SIGNIFICANT CHANGE UP (ref 3.5–5.3)
POTASSIUM SERPL-SCNC: 4 MMOL/L
POTASSIUM SERPL-SCNC: 4 MMOL/L — SIGNIFICANT CHANGE UP (ref 3.5–5.3)
POTASSIUM SERPL-SCNC: 4.1 MMOL/L — SIGNIFICANT CHANGE UP (ref 3.5–5.3)
POTASSIUM SERPL-SCNC: 4.2 MMOL/L — SIGNIFICANT CHANGE UP (ref 3.5–5.3)
POTASSIUM SERPL-SCNC: 4.3 MMOL/L — SIGNIFICANT CHANGE UP (ref 3.5–5.3)
POTASSIUM SERPL-SCNC: 4.3 MMOL/L — SIGNIFICANT CHANGE UP (ref 3.5–5.3)
POTASSIUM SERPL-SCNC: 4.4 MMOL/L — SIGNIFICANT CHANGE UP (ref 3.5–5.3)
POTASSIUM SERPL-SCNC: 4.5 MMOL/L — SIGNIFICANT CHANGE UP (ref 3.5–5.3)
POTASSIUM SERPL-SCNC: 4.5 MMOL/L — SIGNIFICANT CHANGE UP (ref 3.5–5.3)
POTASSIUM SERPL-SCNC: 4.6 MMOL/L — SIGNIFICANT CHANGE UP (ref 3.5–5.3)
POTASSIUM SERPL-SCNC: 4.9 MMOL/L — SIGNIFICANT CHANGE UP (ref 3.5–5.3)
POTASSIUM SERPL-SCNC: 5 MMOL/L — SIGNIFICANT CHANGE UP (ref 3.5–5.3)
POTASSIUM SERPL-SCNC: 5 MMOL/L — SIGNIFICANT CHANGE UP (ref 3.5–5.3)
POTASSIUM SERPL-SCNC: 5.2 MMOL/L — SIGNIFICANT CHANGE UP (ref 3.5–5.3)
POTASSIUM SERPL-SCNC: 5.8 MMOL/L — HIGH (ref 3.5–5.3)
POTASSIUM SERPL-SCNC: 6 MMOL/L — HIGH (ref 3.5–5.3)
POTASSIUM SERPL-SCNC: 6.5 MMOL/L — CRITICAL HIGH (ref 3.5–5.3)
POTASSIUM SERPL-SCNC: 6.6 MMOL/L — CRITICAL HIGH (ref 3.5–5.3)
POTASSIUM SERPL-SCNC: SIGNIFICANT CHANGE UP MMOL/L (ref 3.5–5.3)
POTASSIUM UR-SCNC: 40 MMOL/L — SIGNIFICANT CHANGE UP
PROT ?TM UR-MCNC: 84 MG/DL — HIGH (ref 0–12)
PROT FLD-MCNC: 1.8 G/DL — SIGNIFICANT CHANGE UP
PROT SERPL-MCNC: 4.3 G/DL — LOW (ref 6–8.3)
PROT SERPL-MCNC: 4.5 G/DL — LOW (ref 6–8.3)
PROT SERPL-MCNC: 4.7 G/DL — LOW (ref 6–8.3)
PROT SERPL-MCNC: 4.7 G/DL — LOW (ref 6–8.3)
PROT SERPL-MCNC: 4.8 G/DL — LOW (ref 6–8.3)
PROT SERPL-MCNC: 4.9 G/DL — LOW (ref 6–8.3)
PROT SERPL-MCNC: 5 G/DL — LOW (ref 6–8.3)
PROT SERPL-MCNC: 5.1 G/DL — LOW (ref 6–8.3)
PROT SERPL-MCNC: 5.4 G/DL — LOW (ref 6–8.3)
PROT SERPL-MCNC: 5.5 G/DL — LOW (ref 6–8.3)
PROT SERPL-MCNC: 5.5 G/DL — LOW (ref 6–8.3)
PROT SERPL-MCNC: 5.6 G/DL
PROT SERPL-MCNC: 5.6 G/DL — LOW (ref 6–8.3)
PROT SERPL-MCNC: 5.7 G/DL — LOW (ref 6–8.3)
PROT SERPL-MCNC: 6 G/DL — SIGNIFICANT CHANGE UP (ref 6–8.3)
PROT SERPL-MCNC: 6.1 G/DL — SIGNIFICANT CHANGE UP (ref 6–8.3)
PROT SERPL-MCNC: 6.2 G/DL — SIGNIFICANT CHANGE UP (ref 6–8.3)
PROT SERPL-MCNC: 6.4 G/DL — SIGNIFICANT CHANGE UP (ref 6–8.3)
PROT SERPL-MCNC: 6.4 G/DL — SIGNIFICANT CHANGE UP (ref 6–8.3)
PROT SERPL-MCNC: 6.5 G/DL — SIGNIFICANT CHANGE UP (ref 6–8.3)
PROT SERPL-MCNC: 6.6 G/DL — SIGNIFICANT CHANGE UP (ref 6–8.3)
PROT SERPL-MCNC: 6.7 G/DL — SIGNIFICANT CHANGE UP (ref 6–8.3)
PROT SERPL-MCNC: 6.8 G/DL — SIGNIFICANT CHANGE UP (ref 6–8.3)
PROT SERPL-MCNC: 6.8 G/DL — SIGNIFICANT CHANGE UP (ref 6–8.3)
PROT SERPL-MCNC: 6.9 G/DL — SIGNIFICANT CHANGE UP (ref 6–8.3)
PROT SERPL-MCNC: 7.9 G/DL — SIGNIFICANT CHANGE UP (ref 6–8.3)
PROT UR-MCNC: ABNORMAL MG/DL
PROTHROM AB SERPL-ACNC: 18.1 SEC — HIGH (ref 10.5–13.4)
PROTHROM AB SERPL-ACNC: 18.7 SEC — HIGH (ref 10.5–13.4)
PROTHROM AB SERPL-ACNC: 19.6 SEC — HIGH (ref 10.5–13.4)
PROTHROM AB SERPL-ACNC: 20.3 SEC — HIGH (ref 10.5–13.4)
PROTHROM AB SERPL-ACNC: 20.6 SEC — HIGH (ref 9.5–13)
PROTHROM AB SERPL-ACNC: 21 SEC — HIGH (ref 9.5–13)
PROTHROM AB SERPL-ACNC: 21.3 SEC — HIGH (ref 10.5–13.4)
PROTHROM AB SERPL-ACNC: 23.6 SEC — HIGH (ref 9.5–13)
PROTHROM AB SERPL-ACNC: 24.2 SEC — HIGH (ref 9.5–13)
PROTHROM AB SERPL-ACNC: 25.7 SEC — HIGH (ref 9.5–13)
PROTHROM AB SERPL-ACNC: 26.8 SEC — HIGH (ref 9.5–13)
PROTHROM AB SERPL-ACNC: 27.7 SEC — HIGH (ref 9.5–13)
PROTHROM AB SERPL-ACNC: 28.5 SEC — HIGH (ref 9.5–13)
PROTHROM AB SERPL-ACNC: 28.6 SEC — HIGH (ref 9.5–13)
PROTHROM AB SERPL-ACNC: 29.2 SEC — HIGH (ref 9.5–13)
RAPID RVP RESULT: SIGNIFICANT CHANGE UP
RBC # BLD: 2.3 M/UL — LOW (ref 4.2–5.8)
RBC # BLD: 2.42 M/UL — LOW (ref 4.2–5.8)
RBC # BLD: 2.44 M/UL — LOW (ref 4.2–5.8)
RBC # BLD: 2.5 M/UL — LOW (ref 4.2–5.8)
RBC # BLD: 2.53 M/UL — LOW (ref 4.2–5.8)
RBC # BLD: 2.59 M/UL — LOW (ref 4.2–5.8)
RBC # BLD: 2.62 M/UL — LOW (ref 4.2–5.8)
RBC # BLD: 2.66 M/UL — LOW (ref 4.2–5.8)
RBC # BLD: 2.79 M/UL — LOW (ref 4.2–5.8)
RBC # BLD: 2.84 M/UL — LOW (ref 4.2–5.8)
RBC # BLD: 2.86 M/UL — LOW (ref 4.2–5.8)
RBC # BLD: 2.86 M/UL — LOW (ref 4.2–5.8)
RBC # BLD: 2.9 M/UL — LOW (ref 4.2–5.8)
RBC # BLD: 2.9 M/UL — LOW (ref 4.2–5.8)
RBC # BLD: 2.96 M/UL — LOW (ref 4.2–5.8)
RBC # BLD: 2.96 M/UL — LOW (ref 4.2–5.8)
RBC # BLD: 2.97 M/UL — LOW (ref 4.2–5.8)
RBC # BLD: 3.05 M/UL — LOW (ref 4.2–5.8)
RBC # BLD: 3.08 M/UL — LOW (ref 4.2–5.8)
RBC # BLD: 3.09 M/UL — LOW (ref 4.2–5.8)
RBC # BLD: 3.16 M/UL — LOW (ref 4.2–5.8)
RBC # BLD: 3.16 M/UL — LOW (ref 4.2–5.8)
RBC # BLD: 3.23 M/UL — LOW (ref 4.2–5.8)
RBC # BLD: 3.35 M/UL — LOW (ref 4.2–5.8)
RBC # BLD: 3.45 M/UL — LOW (ref 4.2–5.8)
RBC # BLD: 3.63 M/UL — LOW (ref 4.2–5.8)
RBC # BLD: 3.63 M/UL — LOW (ref 4.2–5.8)
RBC # BLD: 3.81 M/UL — LOW (ref 4.2–5.8)
RBC # BLD: 3.82 M/UL — LOW (ref 4.2–5.8)
RBC # BLD: 3.96 M/UL — LOW (ref 4.2–5.8)
RBC # BLD: 4.17 M/UL
RBC # BLD: 4.2 M/UL — SIGNIFICANT CHANGE UP (ref 4.2–5.8)
RBC # BLD: 4.21 M/UL — SIGNIFICANT CHANGE UP (ref 4.2–5.8)
RBC # BLD: 4.26 M/UL — SIGNIFICANT CHANGE UP (ref 4.2–5.8)
RBC # BLD: 4.3 M/UL — SIGNIFICANT CHANGE UP (ref 4.2–5.8)
RBC # BLD: 4.47 M/UL — SIGNIFICANT CHANGE UP (ref 4.2–5.8)
RBC # BLD: 4.8 M/UL — SIGNIFICANT CHANGE UP (ref 4.2–5.8)
RBC # BLD: 4.8 M/UL — SIGNIFICANT CHANGE UP (ref 4.2–5.8)
RBC # BLD: 5.59 M/UL — SIGNIFICANT CHANGE UP (ref 4.2–5.8)
RBC # BLD: 5.91 M/UL — HIGH (ref 4.2–5.8)
RBC # FLD: 14.3 % — SIGNIFICANT CHANGE UP (ref 10.3–14.5)
RBC # FLD: 14.4 % — SIGNIFICANT CHANGE UP (ref 10.3–14.5)
RBC # FLD: 14.6 % — HIGH (ref 10.3–14.5)
RBC # FLD: 14.6 % — HIGH (ref 10.3–14.5)
RBC # FLD: 14.7 % — HIGH (ref 10.3–14.5)
RBC # FLD: 14.8 % — HIGH (ref 10.3–14.5)
RBC # FLD: 14.8 % — HIGH (ref 10.3–14.5)
RBC # FLD: 15 % — HIGH (ref 10.3–14.5)
RBC # FLD: 15.5 % — HIGH (ref 10.3–14.5)
RBC # FLD: 16.6 % — HIGH (ref 10.3–14.5)
RBC # FLD: 16.6 % — HIGH (ref 10.3–14.5)
RBC # FLD: 16.7 % — HIGH (ref 10.3–14.5)
RBC # FLD: 16.8 % — HIGH (ref 10.3–14.5)
RBC # FLD: 16.9 % — HIGH (ref 10.3–14.5)
RBC # FLD: 17.1 % — HIGH (ref 10.3–14.5)
RBC # FLD: 17.2 % — HIGH (ref 10.3–14.5)
RBC # FLD: 17.3 % — HIGH (ref 10.3–14.5)
RBC # FLD: 17.4 % — HIGH (ref 10.3–14.5)
RBC # FLD: 17.5 % — HIGH (ref 10.3–14.5)
RBC # FLD: 17.5 % — HIGH (ref 10.3–14.5)
RBC # FLD: 17.6 % — HIGH (ref 10.3–14.5)
RBC # FLD: 17.6 % — HIGH (ref 10.3–14.5)
RBC # FLD: 17.7 % — HIGH (ref 10.3–14.5)
RBC # FLD: 18.1 % — HIGH (ref 10.3–14.5)
RBC # FLD: 18.1 % — HIGH (ref 10.3–14.5)
RBC # FLD: 18.4 % — HIGH (ref 10.3–14.5)
RBC # FLD: 18.5 % — HIGH (ref 10.3–14.5)
RBC # FLD: 18.6 % — HIGH (ref 10.3–14.5)
RBC # FLD: 18.9 % — HIGH (ref 10.3–14.5)
RBC # FLD: 19.3 % — HIGH (ref 10.3–14.5)
RBC # FLD: 19.3 % — HIGH (ref 10.3–14.5)
RBC # FLD: 19.9 % — HIGH (ref 10.3–14.5)
RBC # FLD: 21.6 % — HIGH (ref 10.3–14.5)
RBC # FLD: 21.9 % — HIGH (ref 10.3–14.5)
RBC # FLD: 22.1 % — HIGH (ref 10.3–14.5)
RBC # FLD: 22.3 % — HIGH (ref 10.3–14.5)
RBC # FLD: 22.4 % — HIGH (ref 10.3–14.5)
RBC # FLD: 26.2 %
RBC BLD AUTO: ABNORMAL
RBC CASTS # UR COMP ASSIST: >50 /HPF — SIGNIFICANT CHANGE UP (ref 0–4)
RCV VOL RI: 1550 /UL — HIGH (ref 0–5)
S AUREUS DNA NOSE QL NAA+PROBE: DETECTED
S AUREUS DNA NOSE QL NAA+PROBE: DETECTED
S AUREUS DNA NOSE QL NAA+PROBE: SIGNIFICANT CHANGE UP
SAO2 % BLDA: 100 % — SIGNIFICANT CHANGE UP
SAO2 % BLDA: 99 % — SIGNIFICANT CHANGE UP
SAO2 % BLDV: 40.5 % — SIGNIFICANT CHANGE UP
SAO2 % BLDV: 85.2 % — SIGNIFICANT CHANGE UP
SAO2 % BLDV: 89.4 % — SIGNIFICANT CHANGE UP
SARS-COV-2 RNA SPEC QL NAA+PROBE: SIGNIFICANT CHANGE UP
SODIUM SERPL-SCNC: 131 MMOL/L — LOW (ref 135–145)
SODIUM SERPL-SCNC: 131 MMOL/L — LOW (ref 135–145)
SODIUM SERPL-SCNC: 134 MMOL/L — LOW (ref 135–145)
SODIUM SERPL-SCNC: 135 MMOL/L — SIGNIFICANT CHANGE UP (ref 135–145)
SODIUM SERPL-SCNC: 136 MMOL/L — SIGNIFICANT CHANGE UP (ref 135–145)
SODIUM SERPL-SCNC: 137 MMOL/L — SIGNIFICANT CHANGE UP (ref 135–145)
SODIUM SERPL-SCNC: 138 MMOL/L — SIGNIFICANT CHANGE UP (ref 135–145)
SODIUM SERPL-SCNC: 139 MMOL/L — SIGNIFICANT CHANGE UP (ref 135–145)
SODIUM SERPL-SCNC: 140 MMOL/L — SIGNIFICANT CHANGE UP (ref 135–145)
SODIUM SERPL-SCNC: 141 MMOL/L — SIGNIFICANT CHANGE UP (ref 135–145)
SODIUM SERPL-SCNC: 144 MMOL/L — SIGNIFICANT CHANGE UP (ref 135–145)
SODIUM SERPL-SCNC: 144 MMOL/L — SIGNIFICANT CHANGE UP (ref 135–145)
SODIUM SERPL-SCNC: 145 MMOL/L
SODIUM SERPL-SCNC: 146 MMOL/L — HIGH (ref 135–145)
SODIUM SERPL-SCNC: 148 MMOL/L — HIGH (ref 135–145)
SODIUM SERPL-SCNC: 149 MMOL/L — HIGH (ref 135–145)
SODIUM SERPL-SCNC: 152 MMOL/L — HIGH (ref 135–145)
SODIUM UR-SCNC: 12 MMOL/L — SIGNIFICANT CHANGE UP
SODIUM UR-SCNC: 28 MMOL/L — SIGNIFICANT CHANGE UP
SODIUM UR-SCNC: <5 MMOL/L — SIGNIFICANT CHANGE UP
SP GR SPEC: 1.02 — SIGNIFICANT CHANGE UP (ref 1–1.03)
SPECIMEN SOURCE: SIGNIFICANT CHANGE UP
SURGICAL PATHOLOGY STUDY: SIGNIFICANT CHANGE UP
TIBC SERPL-MCNC: 247 UG/DL — LOW (ref 250–450)
TOTAL NUCLEATED CELL COUNT, BODY FLUID: 9063 /UL — SIGNIFICANT CHANGE UP
TRANSFERRIN SERPL-MCNC: 160 MG/DL — LOW (ref 200–360)
TROPONIN I, HIGH SENSITIVITY RESULT: 12 NG/L — SIGNIFICANT CHANGE UP
TROPONIN I, HIGH SENSITIVITY RESULT: 6.6 NG/L — SIGNIFICANT CHANGE UP
TROPONIN I, HIGH SENSITIVITY RESULT: 6.8 NG/L — SIGNIFICANT CHANGE UP
TUBE TYPE: SIGNIFICANT CHANGE UP
UIBC SERPL-MCNC: 65 UG/DL — LOW (ref 110–370)
UROBILINOGEN FLD QL: 1 MG/DL — SIGNIFICANT CHANGE UP (ref 0.2–1)
UUN UR-MCNC: 449 MG/DL — SIGNIFICANT CHANGE UP
VARIANT LYMPHS # BLD: 2 % — SIGNIFICANT CHANGE UP (ref 0–6)
WBC # BLD: 10.53 K/UL — HIGH (ref 3.8–10.5)
WBC # BLD: 10.61 K/UL — HIGH (ref 3.8–10.5)
WBC # BLD: 10.73 K/UL — HIGH (ref 3.8–10.5)
WBC # BLD: 10.9 K/UL — HIGH (ref 3.8–10.5)
WBC # BLD: 10.98 K/UL — HIGH (ref 3.8–10.5)
WBC # BLD: 11.54 K/UL — HIGH (ref 3.8–10.5)
WBC # BLD: 11.55 K/UL — HIGH (ref 3.8–10.5)
WBC # BLD: 11.62 K/UL — HIGH (ref 3.8–10.5)
WBC # BLD: 12.03 K/UL — HIGH (ref 3.8–10.5)
WBC # BLD: 12.07 K/UL — HIGH (ref 3.8–10.5)
WBC # BLD: 13.49 K/UL — HIGH (ref 3.8–10.5)
WBC # BLD: 13.8 K/UL — HIGH (ref 3.8–10.5)
WBC # BLD: 14.13 K/UL — HIGH (ref 3.8–10.5)
WBC # BLD: 14.38 K/UL — HIGH (ref 3.8–10.5)
WBC # BLD: 18.92 K/UL — HIGH (ref 3.8–10.5)
WBC # BLD: 3.36 K/UL — LOW (ref 3.8–10.5)
WBC # BLD: 3.92 K/UL — SIGNIFICANT CHANGE UP (ref 3.8–10.5)
WBC # BLD: 4.1 K/UL — SIGNIFICANT CHANGE UP (ref 3.8–10.5)
WBC # BLD: 4.72 K/UL — SIGNIFICANT CHANGE UP (ref 3.8–10.5)
WBC # BLD: 5.74 K/UL — SIGNIFICANT CHANGE UP (ref 3.8–10.5)
WBC # BLD: 5.77 K/UL — SIGNIFICANT CHANGE UP (ref 3.8–10.5)
WBC # BLD: 5.79 K/UL — SIGNIFICANT CHANGE UP (ref 3.8–10.5)
WBC # BLD: 5.96 K/UL — SIGNIFICANT CHANGE UP (ref 3.8–10.5)
WBC # BLD: 5.98 K/UL — SIGNIFICANT CHANGE UP (ref 3.8–10.5)
WBC # BLD: 6.53 K/UL — SIGNIFICANT CHANGE UP (ref 3.8–10.5)
WBC # BLD: 7.19 K/UL — SIGNIFICANT CHANGE UP (ref 3.8–10.5)
WBC # BLD: 7.28 K/UL — SIGNIFICANT CHANGE UP (ref 3.8–10.5)
WBC # BLD: 7.38 K/UL — SIGNIFICANT CHANGE UP (ref 3.8–10.5)
WBC # BLD: 7.44 K/UL — SIGNIFICANT CHANGE UP (ref 3.8–10.5)
WBC # BLD: 7.64 K/UL — SIGNIFICANT CHANGE UP (ref 3.8–10.5)
WBC # BLD: 7.65 K/UL — SIGNIFICANT CHANGE UP (ref 3.8–10.5)
WBC # BLD: 8.11 K/UL — SIGNIFICANT CHANGE UP (ref 3.8–10.5)
WBC # BLD: 8.36 K/UL — SIGNIFICANT CHANGE UP (ref 3.8–10.5)
WBC # BLD: 8.59 K/UL — SIGNIFICANT CHANGE UP (ref 3.8–10.5)
WBC # BLD: 8.65 K/UL — SIGNIFICANT CHANGE UP (ref 3.8–10.5)
WBC # BLD: 8.66 K/UL — SIGNIFICANT CHANGE UP (ref 3.8–10.5)
WBC # BLD: 8.92 K/UL — SIGNIFICANT CHANGE UP (ref 3.8–10.5)
WBC # BLD: 9.69 K/UL — SIGNIFICANT CHANGE UP (ref 3.8–10.5)
WBC # BLD: 9.86 K/UL — SIGNIFICANT CHANGE UP (ref 3.8–10.5)
WBC # FLD AUTO: 10.53 K/UL — HIGH (ref 3.8–10.5)
WBC # FLD AUTO: 10.61 K/UL — HIGH (ref 3.8–10.5)
WBC # FLD AUTO: 10.73 K/UL — HIGH (ref 3.8–10.5)
WBC # FLD AUTO: 10.9 K/UL — HIGH (ref 3.8–10.5)
WBC # FLD AUTO: 10.98 K/UL — HIGH (ref 3.8–10.5)
WBC # FLD AUTO: 11.54 K/UL — HIGH (ref 3.8–10.5)
WBC # FLD AUTO: 11.55 K/UL — HIGH (ref 3.8–10.5)
WBC # FLD AUTO: 11.62 K/UL — HIGH (ref 3.8–10.5)
WBC # FLD AUTO: 12.03 K/UL — HIGH (ref 3.8–10.5)
WBC # FLD AUTO: 12.07 K/UL — HIGH (ref 3.8–10.5)
WBC # FLD AUTO: 13.49 K/UL — HIGH (ref 3.8–10.5)
WBC # FLD AUTO: 13.8 K/UL — HIGH (ref 3.8–10.5)
WBC # FLD AUTO: 14.13 K/UL — HIGH (ref 3.8–10.5)
WBC # FLD AUTO: 14.38 K/UL — HIGH (ref 3.8–10.5)
WBC # FLD AUTO: 18.92 K/UL — HIGH (ref 3.8–10.5)
WBC # FLD AUTO: 3.36 K/UL — LOW (ref 3.8–10.5)
WBC # FLD AUTO: 3.39 K/UL
WBC # FLD AUTO: 3.92 K/UL — SIGNIFICANT CHANGE UP (ref 3.8–10.5)
WBC # FLD AUTO: 4.1 K/UL — SIGNIFICANT CHANGE UP (ref 3.8–10.5)
WBC # FLD AUTO: 4.72 K/UL — SIGNIFICANT CHANGE UP (ref 3.8–10.5)
WBC # FLD AUTO: 5.74 K/UL — SIGNIFICANT CHANGE UP (ref 3.8–10.5)
WBC # FLD AUTO: 5.77 K/UL — SIGNIFICANT CHANGE UP (ref 3.8–10.5)
WBC # FLD AUTO: 5.79 K/UL — SIGNIFICANT CHANGE UP (ref 3.8–10.5)
WBC # FLD AUTO: 5.96 K/UL — SIGNIFICANT CHANGE UP (ref 3.8–10.5)
WBC # FLD AUTO: 5.98 K/UL — SIGNIFICANT CHANGE UP (ref 3.8–10.5)
WBC # FLD AUTO: 6.53 K/UL — SIGNIFICANT CHANGE UP (ref 3.8–10.5)
WBC # FLD AUTO: 7.19 K/UL — SIGNIFICANT CHANGE UP (ref 3.8–10.5)
WBC # FLD AUTO: 7.28 K/UL — SIGNIFICANT CHANGE UP (ref 3.8–10.5)
WBC # FLD AUTO: 7.38 K/UL — SIGNIFICANT CHANGE UP (ref 3.8–10.5)
WBC # FLD AUTO: 7.44 K/UL — SIGNIFICANT CHANGE UP (ref 3.8–10.5)
WBC # FLD AUTO: 7.64 K/UL — SIGNIFICANT CHANGE UP (ref 3.8–10.5)
WBC # FLD AUTO: 7.65 K/UL — SIGNIFICANT CHANGE UP (ref 3.8–10.5)
WBC # FLD AUTO: 8.11 K/UL — SIGNIFICANT CHANGE UP (ref 3.8–10.5)
WBC # FLD AUTO: 8.36 K/UL — SIGNIFICANT CHANGE UP (ref 3.8–10.5)
WBC # FLD AUTO: 8.59 K/UL — SIGNIFICANT CHANGE UP (ref 3.8–10.5)
WBC # FLD AUTO: 8.65 K/UL — SIGNIFICANT CHANGE UP (ref 3.8–10.5)
WBC # FLD AUTO: 8.66 K/UL — SIGNIFICANT CHANGE UP (ref 3.8–10.5)
WBC # FLD AUTO: 8.92 K/UL — SIGNIFICANT CHANGE UP (ref 3.8–10.5)
WBC # FLD AUTO: 9.69 K/UL — SIGNIFICANT CHANGE UP (ref 3.8–10.5)
WBC # FLD AUTO: 9.86 K/UL — SIGNIFICANT CHANGE UP (ref 3.8–10.5)
WBC UR QL: 10 /HPF — HIGH (ref 0–5)

## 2023-01-01 PROCEDURE — 85025 COMPLETE CBC W/AUTO DIFF WBC: CPT

## 2023-01-01 PROCEDURE — 83690 ASSAY OF LIPASE: CPT

## 2023-01-01 PROCEDURE — 36415 COLL VENOUS BLD VENIPUNCTURE: CPT

## 2023-01-01 PROCEDURE — 80053 COMPREHEN METABOLIC PANEL: CPT

## 2023-01-01 PROCEDURE — 82330 ASSAY OF CALCIUM: CPT

## 2023-01-01 PROCEDURE — 99233 SBSQ HOSP IP/OBS HIGH 50: CPT

## 2023-01-01 PROCEDURE — 97530 THERAPEUTIC ACTIVITIES: CPT

## 2023-01-01 PROCEDURE — 86900 BLOOD TYPING SEROLOGIC ABO: CPT

## 2023-01-01 PROCEDURE — 43264 ERCP REMOVE DUCT CALCULI: CPT

## 2023-01-01 PROCEDURE — 99285 EMERGENCY DEPT VISIT HI MDM: CPT

## 2023-01-01 PROCEDURE — 87641 MR-STAPH DNA AMP PROBE: CPT

## 2023-01-01 PROCEDURE — 99223 1ST HOSP IP/OBS HIGH 75: CPT

## 2023-01-01 PROCEDURE — 99232 SBSQ HOSP IP/OBS MODERATE 35: CPT

## 2023-01-01 PROCEDURE — 95819 EEG AWAKE AND ASLEEP: CPT

## 2023-01-01 PROCEDURE — 76775 US EXAM ABDO BACK WALL LIM: CPT | Mod: 26

## 2023-01-01 PROCEDURE — 71260 CT THORAX DX C+: CPT | Mod: 26,MA

## 2023-01-01 PROCEDURE — 49083 ABD PARACENTESIS W/IMAGING: CPT

## 2023-01-01 PROCEDURE — 95957 EEG DIGITAL ANALYSIS: CPT

## 2023-01-01 PROCEDURE — 43275 ERCP REMOVE FORGN BODY DUCT: CPT

## 2023-01-01 PROCEDURE — 82962 GLUCOSE BLOOD TEST: CPT

## 2023-01-01 PROCEDURE — 74177 CT ABD & PELVIS W/CONTRAST: CPT | Mod: 26,MA

## 2023-01-01 PROCEDURE — 96375 TX/PRO/DX INJ NEW DRUG ADDON: CPT

## 2023-01-01 PROCEDURE — 93005 ELECTROCARDIOGRAM TRACING: CPT

## 2023-01-01 PROCEDURE — 71045 X-RAY EXAM CHEST 1 VIEW: CPT | Mod: 26

## 2023-01-01 PROCEDURE — 82140 ASSAY OF AMMONIA: CPT

## 2023-01-01 PROCEDURE — 87635 SARS-COV-2 COVID-19 AMP PRB: CPT

## 2023-01-01 PROCEDURE — 83735 ASSAY OF MAGNESIUM: CPT

## 2023-01-01 PROCEDURE — 83540 ASSAY OF IRON: CPT

## 2023-01-01 PROCEDURE — 82570 ASSAY OF URINE CREATININE: CPT

## 2023-01-01 PROCEDURE — C2625: CPT

## 2023-01-01 PROCEDURE — 36430 TRANSFUSION BLD/BLD COMPNT: CPT

## 2023-01-01 PROCEDURE — 94003 VENT MGMT INPAT SUBQ DAY: CPT

## 2023-01-01 PROCEDURE — 43274 ERCP DUCT STENT PLACEMENT: CPT | Mod: 59

## 2023-01-01 PROCEDURE — 84100 ASSAY OF PHOSPHORUS: CPT

## 2023-01-01 PROCEDURE — 85730 THROMBOPLASTIN TIME PARTIAL: CPT

## 2023-01-01 PROCEDURE — 86923 COMPATIBILITY TEST ELECTRIC: CPT

## 2023-01-01 PROCEDURE — 80048 BASIC METABOLIC PNL TOTAL CA: CPT

## 2023-01-01 PROCEDURE — 99291 CRITICAL CARE FIRST HOUR: CPT

## 2023-01-01 PROCEDURE — 84300 ASSAY OF URINE SODIUM: CPT

## 2023-01-01 PROCEDURE — 99284 EMERGENCY DEPT VISIT MOD MDM: CPT | Mod: 25

## 2023-01-01 PROCEDURE — 96374 THER/PROPH/DIAG INJ IV PUSH: CPT | Mod: XU

## 2023-01-01 PROCEDURE — 85027 COMPLETE CBC AUTOMATED: CPT

## 2023-01-01 PROCEDURE — 99239 HOSP IP/OBS DSCHRG MGMT >30: CPT

## 2023-01-01 PROCEDURE — 99203 OFFICE O/P NEW LOW 30 MIN: CPT

## 2023-01-01 PROCEDURE — 74177 CT ABD & PELVIS W/CONTRAST: CPT | Mod: MA

## 2023-01-01 PROCEDURE — 76000 FLUOROSCOPY <1 HR PHYS/QHP: CPT

## 2023-01-01 PROCEDURE — 85610 PROTHROMBIN TIME: CPT

## 2023-01-01 PROCEDURE — 76775 US EXAM ABDO BACK WALL LIM: CPT

## 2023-01-01 PROCEDURE — 82272 OCCULT BLD FECES 1-3 TESTS: CPT

## 2023-01-01 PROCEDURE — 99291 CRITICAL CARE FIRST HOUR: CPT | Mod: GC

## 2023-01-01 PROCEDURE — 99222 1ST HOSP IP/OBS MODERATE 55: CPT

## 2023-01-01 PROCEDURE — 87150 DNA/RNA AMPLIFIED PROBE: CPT

## 2023-01-01 PROCEDURE — 82042 OTHER SOURCE ALBUMIN QUAN EA: CPT

## 2023-01-01 PROCEDURE — 96374 THER/PROPH/DIAG INJ IV PUSH: CPT

## 2023-01-01 PROCEDURE — 83605 ASSAY OF LACTIC ACID: CPT

## 2023-01-01 PROCEDURE — P9047: CPT

## 2023-01-01 PROCEDURE — 84484 ASSAY OF TROPONIN QUANT: CPT

## 2023-01-01 PROCEDURE — 99223 1ST HOSP IP/OBS HIGH 75: CPT | Mod: 25

## 2023-01-01 PROCEDURE — 99285 EMERGENCY DEPT VISIT HI MDM: CPT | Mod: 25

## 2023-01-01 PROCEDURE — 81001 URINALYSIS AUTO W/SCOPE: CPT

## 2023-01-01 PROCEDURE — 87040 BLOOD CULTURE FOR BACTERIA: CPT

## 2023-01-01 PROCEDURE — P9040: CPT

## 2023-01-01 PROCEDURE — 80076 HEPATIC FUNCTION PANEL: CPT

## 2023-01-01 PROCEDURE — 88300 SURGICAL PATH GROSS: CPT

## 2023-01-01 PROCEDURE — 84540 ASSAY OF URINE/UREA-N: CPT

## 2023-01-01 PROCEDURE — C1889: CPT

## 2023-01-01 PROCEDURE — 84156 ASSAY OF PROTEIN URINE: CPT

## 2023-01-01 PROCEDURE — 99497 ADVNCD CARE PLAN 30 MIN: CPT

## 2023-01-01 PROCEDURE — 74018 RADEX ABDOMEN 1 VIEW: CPT

## 2023-01-01 PROCEDURE — C1769: CPT

## 2023-01-01 PROCEDURE — 95816 EEG AWAKE AND DROWSY: CPT | Mod: 26

## 2023-01-01 PROCEDURE — 82009 KETONE BODYS QUAL: CPT

## 2023-01-01 PROCEDURE — 88300 SURGICAL PATH GROSS: CPT | Mod: 26

## 2023-01-01 PROCEDURE — 84132 ASSAY OF SERUM POTASSIUM: CPT

## 2023-01-01 PROCEDURE — 94760 N-INVAS EAR/PLS OXIMETRY 1: CPT

## 2023-01-01 PROCEDURE — 87075 CULTR BACTERIA EXCEPT BLOOD: CPT

## 2023-01-01 PROCEDURE — 82105 ALPHA-FETOPROTEIN SERUM: CPT

## 2023-01-01 PROCEDURE — 84295 ASSAY OF SERUM SODIUM: CPT

## 2023-01-01 PROCEDURE — 87640 STAPH A DNA AMP PROBE: CPT

## 2023-01-01 PROCEDURE — 86850 RBC ANTIBODY SCREEN: CPT

## 2023-01-01 PROCEDURE — 84157 ASSAY OF PROTEIN OTHER: CPT

## 2023-01-01 PROCEDURE — 74018 RADEX ABDOMEN 1 VIEW: CPT | Mod: 26

## 2023-01-01 PROCEDURE — 70450 CT HEAD/BRAIN W/O DYE: CPT | Mod: 26

## 2023-01-01 PROCEDURE — 82378 CARCINOEMBRYONIC ANTIGEN: CPT

## 2023-01-01 PROCEDURE — 82803 BLOOD GASES ANY COMBINATION: CPT

## 2023-01-01 PROCEDURE — 43264 ERCP REMOVE DUCT CALCULI: CPT | Mod: 59

## 2023-01-01 PROCEDURE — 82550 ASSAY OF CK (CPK): CPT

## 2023-01-01 PROCEDURE — 84466 ASSAY OF TRANSFERRIN: CPT

## 2023-01-01 PROCEDURE — 71045 X-RAY EXAM CHEST 1 VIEW: CPT

## 2023-01-01 PROCEDURE — 71260 CT THORAX DX C+: CPT | Mod: MA

## 2023-01-01 PROCEDURE — 87205 SMEAR GRAM STAIN: CPT

## 2023-01-01 PROCEDURE — 84133 ASSAY OF URINE POTASSIUM: CPT

## 2023-01-01 PROCEDURE — 87077 CULTURE AEROBIC IDENTIFY: CPT

## 2023-01-01 PROCEDURE — 83880 ASSAY OF NATRIURETIC PEPTIDE: CPT

## 2023-01-01 PROCEDURE — 0225U NFCT DS DNA&RNA 21 SARSCOV2: CPT

## 2023-01-01 PROCEDURE — 83550 IRON BINDING TEST: CPT

## 2023-01-01 PROCEDURE — 70450 CT HEAD/BRAIN W/O DYE: CPT

## 2023-01-01 PROCEDURE — 89051 BODY FLUID CELL COUNT: CPT

## 2023-01-01 PROCEDURE — 43753 TX GASTRO INTUB W/ASP: CPT

## 2023-01-01 PROCEDURE — 86301 IMMUNOASSAY TUMOR CA 19-9: CPT

## 2023-01-01 PROCEDURE — 83930 ASSAY OF BLOOD OSMOLALITY: CPT

## 2023-01-01 PROCEDURE — 99497 ADVNCD CARE PLAN 30 MIN: CPT | Mod: 25

## 2023-01-01 PROCEDURE — 99233 SBSQ HOSP IP/OBS HIGH 50: CPT | Mod: 25

## 2023-01-01 PROCEDURE — 86901 BLOOD TYPING SEROLOGIC RH(D): CPT

## 2023-01-01 PROCEDURE — P9045: CPT

## 2023-01-01 PROCEDURE — 83615 LACTATE (LD) (LDH) ENZYME: CPT

## 2023-01-01 PROCEDURE — 93306 TTE W/DOPPLER COMPLETE: CPT

## 2023-01-01 PROCEDURE — 97162 PT EVAL MOD COMPLEX 30 MIN: CPT

## 2023-01-01 PROCEDURE — 94002 VENT MGMT INPAT INIT DAY: CPT

## 2023-01-01 PROCEDURE — 82945 GLUCOSE OTHER FLUID: CPT

## 2023-01-01 PROCEDURE — 83935 ASSAY OF URINE OSMOLALITY: CPT

## 2023-01-01 PROCEDURE — 74177 CT ABD & PELVIS W/CONTRAST: CPT | Mod: 26

## 2023-01-01 PROCEDURE — 87070 CULTURE OTHR SPECIMN AEROBIC: CPT

## 2023-01-01 DEVICE — BLLN EXTRCTR PRO RX-S 9-12MM: Type: IMPLANTABLE DEVICE | Status: FUNCTIONAL

## 2023-01-01 DEVICE — CATH 3 LUMEN EXTRACTIN BALLOON 15MM: Type: IMPLANTABLE DEVICE | Status: FUNCTIONAL

## 2023-01-01 DEVICE — STENT BIL COTN-L 10FRX7CM: Type: IMPLANTABLE DEVICE | Status: FUNCTIONAL

## 2023-01-01 DEVICE — JAGWIRE HYDRA STR .035 X 260CM: Type: IMPLANTABLE DEVICE | Status: FUNCTIONAL

## 2023-01-01 DEVICE — STENT BIL COTN-L 10FRX10CM: Type: IMPLANTABLE DEVICE | Status: FUNCTIONAL

## 2023-01-01 DEVICE — DREAMWIRE STD .035IN STRT: Type: IMPLANTABLE DEVICE | Status: FUNCTIONAL

## 2023-01-01 DEVICE — STENT BIL COTN-L 10FRX12CM: Type: IMPLANTABLE DEVICE | Status: FUNCTIONAL

## 2023-01-01 DEVICE — STENT BIL ADVANIX RX 10FRX5CM: Type: IMPLANTABLE DEVICE | Status: FUNCTIONAL

## 2023-01-01 DEVICE — STENT BIL COTN-L 10FRX9CM: Type: IMPLANTABLE DEVICE | Status: FUNCTIONAL

## 2023-01-01 DEVICE — HYDRATOME 44: Type: IMPLANTABLE DEVICE | Status: FUNCTIONAL

## 2023-01-01 DEVICE — STENT BIL WALL RX 10FRX7CM: Type: IMPLANTABLE DEVICE | Status: FUNCTIONAL

## 2023-01-01 DEVICE — STENT BIL OASIS 1ACT 11.5FR INTRO SYS: Type: IMPLANTABLE DEVICE | Status: FUNCTIONAL

## 2023-01-01 RX ORDER — CHLORHEXIDINE GLUCONATE 213 G/1000ML
15 SOLUTION TOPICAL EVERY 12 HOURS
Refills: 0 | Status: DISCONTINUED | OUTPATIENT
Start: 2023-01-01 | End: 2023-01-01

## 2023-01-01 RX ORDER — POTASSIUM PHOSPHATE, MONOBASIC POTASSIUM PHOSPHATE, DIBASIC 236; 224 MG/ML; MG/ML
30 INJECTION, SOLUTION INTRAVENOUS ONCE
Refills: 0 | Status: COMPLETED | OUTPATIENT
Start: 2023-01-01 | End: 2023-01-01

## 2023-01-01 RX ORDER — PANTOPRAZOLE SODIUM 20 MG/1
8 TABLET, DELAYED RELEASE ORAL
Qty: 80 | Refills: 0 | Status: DISCONTINUED | OUTPATIENT
Start: 2023-01-01 | End: 2023-01-01

## 2023-01-01 RX ORDER — NOREPINEPHRINE BITARTRATE/D5W 8 MG/250ML
0.1 PLASTIC BAG, INJECTION (ML) INTRAVENOUS
Qty: 8 | Refills: 0 | Status: DISCONTINUED | OUTPATIENT
Start: 2023-01-01 | End: 2023-01-01

## 2023-01-01 RX ORDER — NOREPINEPHRINE BITARTRATE/D5W 8 MG/250ML
0.05 PLASTIC BAG, INJECTION (ML) INTRAVENOUS
Qty: 16 | Refills: 0 | Status: DISCONTINUED | OUTPATIENT
Start: 2023-01-01 | End: 2023-01-01

## 2023-01-01 RX ORDER — SODIUM CHLORIDE 9 MG/ML
1000 INJECTION INTRAMUSCULAR; INTRAVENOUS; SUBCUTANEOUS ONCE
Refills: 0 | Status: COMPLETED | OUTPATIENT
Start: 2023-01-01 | End: 2023-01-01

## 2023-01-01 RX ORDER — ACETAMINOPHEN 500 MG
1000 TABLET ORAL ONCE
Refills: 0 | Status: COMPLETED | OUTPATIENT
Start: 2023-01-01 | End: 2023-01-01

## 2023-01-01 RX ORDER — PIPERACILLIN AND TAZOBACTAM 4; .5 G/20ML; G/20ML
3.38 INJECTION, POWDER, LYOPHILIZED, FOR SOLUTION INTRAVENOUS EVERY 8 HOURS
Refills: 0 | Status: DISCONTINUED | OUTPATIENT
Start: 2023-01-01 | End: 2023-01-01

## 2023-01-01 RX ORDER — ALBUMIN HUMAN 25 %
50 VIAL (ML) INTRAVENOUS EVERY 6 HOURS
Refills: 0 | Status: DISCONTINUED | OUTPATIENT
Start: 2023-01-01 | End: 2023-01-01

## 2023-01-01 RX ORDER — CHLORHEXIDINE GLUCONATE 213 G/1000ML
1 SOLUTION TOPICAL DAILY
Refills: 0 | Status: DISCONTINUED | OUTPATIENT
Start: 2023-01-01 | End: 2023-01-01

## 2023-01-01 RX ORDER — PROPOFOL 10 MG/ML
10.1 INJECTION, EMULSION INTRAVENOUS
Qty: 1000 | Refills: 0 | Status: DISCONTINUED | OUTPATIENT
Start: 2023-01-01 | End: 2023-01-01

## 2023-01-01 RX ORDER — CHLORHEXIDINE GLUCONATE 213 G/1000ML
1 SOLUTION TOPICAL
Refills: 0 | Status: DISCONTINUED | OUTPATIENT
Start: 2023-01-01 | End: 2023-01-01

## 2023-01-01 RX ORDER — ACETAMINOPHEN 500 MG
650 TABLET ORAL EVERY 6 HOURS
Refills: 0 | Status: DISCONTINUED | OUTPATIENT
Start: 2023-01-01 | End: 2023-01-01

## 2023-01-01 RX ORDER — PANTOPRAZOLE SODIUM 20 MG/1
40 TABLET, DELAYED RELEASE ORAL DAILY
Refills: 0 | Status: DISCONTINUED | OUTPATIENT
Start: 2023-01-01 | End: 2023-01-01

## 2023-01-01 RX ORDER — LEVOTHYROXINE SODIUM 125 MCG
20 TABLET ORAL AT BEDTIME
Refills: 0 | Status: DISCONTINUED | OUTPATIENT
Start: 2023-01-01 | End: 2023-01-01

## 2023-01-01 RX ORDER — POTASSIUM CHLORIDE 20 MEQ
10 PACKET (EA) ORAL
Refills: 0 | Status: COMPLETED | OUTPATIENT
Start: 2023-01-01 | End: 2023-01-01

## 2023-01-01 RX ORDER — LACTULOSE 10 G/15ML
10 SOLUTION ORAL DAILY
Refills: 0 | Status: DISCONTINUED | OUTPATIENT
Start: 2023-01-01 | End: 2023-01-01

## 2023-01-01 RX ORDER — FERROUS SULFATE 325(65) MG
325 TABLET ORAL AT BEDTIME
Refills: 0 | Status: DISCONTINUED | OUTPATIENT
Start: 2023-01-01 | End: 2023-01-01

## 2023-01-01 RX ORDER — LACTULOSE 10 G/15ML
30 SOLUTION ORAL THREE TIMES A DAY
Refills: 0 | Status: DISCONTINUED | OUTPATIENT
Start: 2023-01-01 | End: 2023-01-01

## 2023-01-01 RX ORDER — ONDANSETRON 8 MG/1
4 TABLET, FILM COATED ORAL ONCE
Refills: 0 | Status: DISCONTINUED | OUTPATIENT
Start: 2023-01-01 | End: 2023-01-01

## 2023-01-01 RX ORDER — LEVOTHYROXINE SODIUM 125 MCG
1 TABLET ORAL
Qty: 0 | Refills: 0 | DISCHARGE

## 2023-01-01 RX ORDER — SODIUM CHLORIDE 9 MG/ML
1000 INJECTION, SOLUTION INTRAVENOUS
Refills: 0 | Status: DISCONTINUED | OUTPATIENT
Start: 2023-01-01 | End: 2023-01-01

## 2023-01-01 RX ORDER — MORPHINE SULFATE 50 MG/1
4 CAPSULE, EXTENDED RELEASE ORAL ONCE
Refills: 0 | Status: DISCONTINUED | OUTPATIENT
Start: 2023-01-01 | End: 2023-01-01

## 2023-01-01 RX ORDER — OCTREOTIDE ACETATE 200 UG/ML
50 INJECTION, SOLUTION INTRAVENOUS; SUBCUTANEOUS
Qty: 500 | Refills: 0 | Status: DISCONTINUED | OUTPATIENT
Start: 2023-01-01 | End: 2023-01-01

## 2023-01-01 RX ORDER — NOREPINEPHRINE BITARTRATE/D5W 8 MG/250ML
0.05 PLASTIC BAG, INJECTION (ML) INTRAVENOUS
Qty: 8 | Refills: 0 | Status: DISCONTINUED | OUTPATIENT
Start: 2023-01-01 | End: 2023-01-01

## 2023-01-01 RX ORDER — ETOMIDATE 2 MG/ML
20 INJECTION INTRAVENOUS ONCE
Refills: 0 | Status: COMPLETED | OUTPATIENT
Start: 2023-01-01 | End: 2023-01-01

## 2023-01-01 RX ORDER — ONDANSETRON 8 MG/1
4 TABLET, FILM COATED ORAL ONCE
Refills: 0 | Status: COMPLETED | OUTPATIENT
Start: 2023-01-01 | End: 2023-01-01

## 2023-01-01 RX ORDER — METRONIDAZOLE 500 MG
1 TABLET ORAL
Qty: 21 | Refills: 0
Start: 2023-01-01 | End: 2023-01-01

## 2023-01-01 RX ORDER — PIPERACILLIN AND TAZOBACTAM 4; .5 G/20ML; G/20ML
3.38 INJECTION, POWDER, LYOPHILIZED, FOR SOLUTION INTRAVENOUS ONCE
Refills: 0 | Status: COMPLETED | OUTPATIENT
Start: 2023-01-01 | End: 2023-01-01

## 2023-01-01 RX ORDER — INSULIN HUMAN 100 [IU]/ML
5 INJECTION, SOLUTION SUBCUTANEOUS ONCE
Refills: 0 | Status: COMPLETED | OUTPATIENT
Start: 2023-01-01 | End: 2023-01-01

## 2023-01-01 RX ORDER — SODIUM CHLORIDE 9 MG/ML
10 INJECTION INTRAMUSCULAR; INTRAVENOUS; SUBCUTANEOUS
Refills: 0 | Status: DISCONTINUED | OUTPATIENT
Start: 2023-01-01 | End: 2023-01-01

## 2023-01-01 RX ORDER — MORPHINE SULFATE 50 MG/1
2 CAPSULE, EXTENDED RELEASE ORAL ONCE
Refills: 0 | Status: DISCONTINUED | OUTPATIENT
Start: 2023-01-01 | End: 2023-01-01

## 2023-01-01 RX ORDER — LACTULOSE 10 G/15ML
0 SOLUTION ORAL
Qty: 0 | Refills: 0 | DISCHARGE

## 2023-01-01 RX ORDER — LEVOTHYROXINE SODIUM 125 MCG
25 TABLET ORAL DAILY
Refills: 0 | Status: DISCONTINUED | OUTPATIENT
Start: 2023-01-01 | End: 2023-01-01

## 2023-01-01 RX ORDER — SODIUM ZIRCONIUM CYCLOSILICATE 10 G/10G
10 POWDER, FOR SUSPENSION ORAL ONCE
Refills: 0 | Status: COMPLETED | OUTPATIENT
Start: 2023-01-01 | End: 2023-01-01

## 2023-01-01 RX ORDER — MIDODRINE HYDROCHLORIDE 2.5 MG/1
5 TABLET ORAL EVERY 8 HOURS
Refills: 0 | Status: DISCONTINUED | OUTPATIENT
Start: 2023-01-01 | End: 2023-01-01

## 2023-01-01 RX ORDER — CLOPIDOGREL BISULFATE 75 MG/1
1 TABLET, FILM COATED ORAL
Qty: 0 | Refills: 0 | DISCHARGE

## 2023-01-01 RX ORDER — CALCIUM GLUCONATE 100 MG/ML
1 VIAL (ML) INTRAVENOUS ONCE
Refills: 0 | Status: COMPLETED | OUTPATIENT
Start: 2023-01-01 | End: 2023-01-01

## 2023-01-01 RX ORDER — ALBUMIN HUMAN 25 %
50 VIAL (ML) INTRAVENOUS EVERY 6 HOURS
Refills: 0 | Status: COMPLETED | OUTPATIENT
Start: 2023-01-01 | End: 2023-01-01

## 2023-01-01 RX ORDER — MUPIROCIN 20 MG/G
1 OINTMENT TOPICAL
Refills: 0 | Status: COMPLETED | OUTPATIENT
Start: 2023-01-01 | End: 2023-01-01

## 2023-01-01 RX ORDER — PANTOPRAZOLE SODIUM 20 MG/1
40 TABLET, DELAYED RELEASE ORAL
Refills: 0 | Status: DISCONTINUED | OUTPATIENT
Start: 2023-01-01 | End: 2023-01-01

## 2023-01-01 RX ORDER — MAGNESIUM SULFATE 500 MG/ML
2 VIAL (ML) INJECTION ONCE
Refills: 0 | Status: COMPLETED | OUTPATIENT
Start: 2023-01-01 | End: 2023-01-01

## 2023-01-01 RX ORDER — ASPIRIN/CALCIUM CARB/MAGNESIUM 324 MG
1 TABLET ORAL
Qty: 0 | Refills: 0 | DISCHARGE

## 2023-01-01 RX ORDER — HYDROMORPHONE HYDROCHLORIDE 2 MG/ML
0.5 INJECTION INTRAMUSCULAR; INTRAVENOUS; SUBCUTANEOUS
Refills: 0 | Status: DISCONTINUED | OUTPATIENT
Start: 2023-01-01 | End: 2023-01-01

## 2023-01-01 RX ORDER — LANOLIN ALCOHOL/MO/W.PET/CERES
1 CREAM (GRAM) TOPICAL
Qty: 0 | Refills: 0 | DISCHARGE

## 2023-01-01 RX ORDER — DEXTROSE 50 % IN WATER 50 %
50 SYRINGE (ML) INTRAVENOUS ONCE
Refills: 0 | Status: COMPLETED | OUTPATIENT
Start: 2023-01-01 | End: 2023-01-01

## 2023-01-01 RX ORDER — FUROSEMIDE 40 MG
80 TABLET ORAL ONCE
Refills: 0 | Status: COMPLETED | OUTPATIENT
Start: 2023-01-01 | End: 2023-01-01

## 2023-01-01 RX ORDER — FAMOTIDINE 10 MG/ML
20 INJECTION INTRAVENOUS DAILY
Refills: 0 | Status: DISCONTINUED | OUTPATIENT
Start: 2023-01-01 | End: 2023-01-01

## 2023-01-01 RX ORDER — CEFUROXIME AXETIL 250 MG
1 TABLET ORAL
Qty: 14 | Refills: 0
Start: 2023-01-01 | End: 2023-01-01

## 2023-01-01 RX ORDER — PROPOFOL 10 MG/ML
10 INJECTION, EMULSION INTRAVENOUS
Qty: 1000 | Refills: 0 | Status: DISCONTINUED | OUTPATIENT
Start: 2023-01-01 | End: 2023-01-01

## 2023-01-01 RX ORDER — ETOMIDATE 2 MG/ML
1300 INJECTION INTRAVENOUS ONCE
Refills: 0 | Status: DISCONTINUED | OUTPATIENT
Start: 2023-01-01 | End: 2023-01-01

## 2023-01-01 RX ORDER — SPIRONOLACTONE 25 MG/1
25 TABLET, FILM COATED ORAL DAILY
Refills: 0 | Status: DISCONTINUED | OUTPATIENT
Start: 2023-01-01 | End: 2023-01-01

## 2023-01-01 RX ORDER — INSULIN HUMAN 100 [IU]/ML
10 INJECTION, SOLUTION SUBCUTANEOUS ONCE
Refills: 0 | Status: COMPLETED | OUTPATIENT
Start: 2023-01-01 | End: 2023-01-01

## 2023-01-01 RX ORDER — POLYETHYLENE GLYCOL 3350 17 G/17G
17 POWDER, FOR SOLUTION ORAL EVERY 12 HOURS
Refills: 0 | Status: DISCONTINUED | OUTPATIENT
Start: 2023-01-01 | End: 2023-01-01

## 2023-01-01 RX ORDER — SENNA PLUS 8.6 MG/1
2 TABLET ORAL AT BEDTIME
Refills: 0 | Status: DISCONTINUED | OUTPATIENT
Start: 2023-01-01 | End: 2023-01-01

## 2023-01-01 RX ORDER — PHYTONADIONE (VIT K1) 5 MG
10 TABLET ORAL ONCE
Refills: 0 | Status: COMPLETED | OUTPATIENT
Start: 2023-01-01 | End: 2023-01-01

## 2023-01-01 RX ORDER — FERROUS SULFATE 325(65) MG
1 TABLET ORAL
Qty: 0 | Refills: 0 | DISCHARGE

## 2023-01-01 RX ORDER — SODIUM CHLORIDE 9 MG/ML
1000 INJECTION, SOLUTION INTRAVENOUS
Refills: 0 | Status: COMPLETED | OUTPATIENT
Start: 2023-01-01 | End: 2023-01-01

## 2023-01-01 RX ORDER — ASPIRIN/CALCIUM CARB/MAGNESIUM 324 MG
81 TABLET ORAL DAILY
Refills: 0 | Status: DISCONTINUED | OUTPATIENT
Start: 2023-01-01 | End: 2023-01-01

## 2023-01-01 RX ORDER — ALBUMIN HUMAN 25 %
400 VIAL (ML) INTRAVENOUS ONCE
Refills: 0 | Status: COMPLETED | OUTPATIENT
Start: 2023-01-01 | End: 2023-01-01

## 2023-01-01 RX ORDER — MAGNESIUM SULFATE 500 MG/ML
1 VIAL (ML) INJECTION ONCE
Refills: 0 | Status: COMPLETED | OUTPATIENT
Start: 2023-01-01 | End: 2023-01-01

## 2023-01-01 RX ORDER — ACETAMINOPHEN 500 MG
650 TABLET ORAL ONCE
Refills: 0 | Status: COMPLETED | OUTPATIENT
Start: 2023-01-01 | End: 2023-01-01

## 2023-01-01 RX ORDER — SODIUM POLYSTYRENE SULFONATE 4.1 MEQ/G
15 POWDER, FOR SUSPENSION ORAL ONCE
Refills: 0 | Status: COMPLETED | OUTPATIENT
Start: 2023-01-01 | End: 2023-01-01

## 2023-01-01 RX ORDER — HYDROMORPHONE HYDROCHLORIDE 2 MG/ML
0.2 INJECTION INTRAMUSCULAR; INTRAVENOUS; SUBCUTANEOUS ONCE
Refills: 0 | Status: DISCONTINUED | OUTPATIENT
Start: 2023-01-01 | End: 2023-01-01

## 2023-01-01 RX ORDER — SODIUM CHLORIDE 9 MG/ML
1000 INJECTION INTRAMUSCULAR; INTRAVENOUS; SUBCUTANEOUS
Refills: 0 | Status: DISCONTINUED | OUTPATIENT
Start: 2023-01-01 | End: 2023-01-01

## 2023-01-01 RX ORDER — CALCIUM GLUCONATE 100 MG/ML
2 VIAL (ML) INTRAVENOUS ONCE
Refills: 0 | Status: COMPLETED | OUTPATIENT
Start: 2023-01-01 | End: 2023-01-01

## 2023-01-01 RX ORDER — ALBUMIN HUMAN 25 %
250 VIAL (ML) INTRAVENOUS ONCE
Refills: 0 | Status: COMPLETED | OUTPATIENT
Start: 2023-01-01 | End: 2023-01-01

## 2023-01-01 RX ORDER — SODIUM CHLORIDE 9 MG/ML
500 INJECTION INTRAMUSCULAR; INTRAVENOUS; SUBCUTANEOUS ONCE
Refills: 0 | Status: COMPLETED | OUTPATIENT
Start: 2023-01-01 | End: 2023-01-01

## 2023-01-01 RX ORDER — INDOMETHACIN 50 MG
50 CAPSULE ORAL ONCE
Refills: 0 | Status: COMPLETED | OUTPATIENT
Start: 2023-01-01 | End: 2023-01-01

## 2023-01-01 RX ORDER — FENTANYL CITRATE 50 UG/ML
0.5 INJECTION INTRAVENOUS
Qty: 2500 | Refills: 0 | Status: DISCONTINUED | OUTPATIENT
Start: 2023-01-01 | End: 2023-01-01

## 2023-01-01 RX ORDER — MIDODRINE HYDROCHLORIDE 2.5 MG/1
5 TABLET ORAL ONCE
Refills: 0 | Status: COMPLETED | OUTPATIENT
Start: 2023-01-01 | End: 2023-01-01

## 2023-01-01 RX ORDER — CEFTRIAXONE 500 MG/1
2000 INJECTION, POWDER, FOR SOLUTION INTRAMUSCULAR; INTRAVENOUS EVERY 24 HOURS
Refills: 0 | Status: COMPLETED | OUTPATIENT
Start: 2023-01-01 | End: 2023-01-01

## 2023-01-01 RX ORDER — LACTULOSE 10 G/15ML
15 SOLUTION ORAL
Qty: 0 | Refills: 0 | DISCHARGE
Start: 2023-01-01

## 2023-01-01 RX ORDER — ALBUMIN HUMAN 25 %
100 VIAL (ML) INTRAVENOUS EVERY 8 HOURS
Refills: 0 | Status: COMPLETED | OUTPATIENT
Start: 2023-01-01 | End: 2023-01-01

## 2023-01-01 RX ORDER — ENOXAPARIN SODIUM 100 MG/ML
40 INJECTION SUBCUTANEOUS EVERY 24 HOURS
Refills: 0 | Status: DISCONTINUED | OUTPATIENT
Start: 2023-01-01 | End: 2023-01-01

## 2023-01-01 RX ORDER — LANOLIN ALCOHOL/MO/W.PET/CERES
1 CREAM (GRAM) TOPICAL AT BEDTIME
Refills: 0 | Status: DISCONTINUED | OUTPATIENT
Start: 2023-01-01 | End: 2023-01-01

## 2023-01-01 RX ORDER — ALBUMIN HUMAN 25 %
100 VIAL (ML) INTRAVENOUS EVERY 8 HOURS
Refills: 0 | Status: DISCONTINUED | OUTPATIENT
Start: 2023-01-01 | End: 2023-01-01

## 2023-01-01 RX ORDER — POLYETHYLENE GLYCOL 3350 17 G/17G
17 POWDER, FOR SOLUTION ORAL
Qty: 0 | Refills: 0 | DISCHARGE
Start: 2023-01-01

## 2023-01-01 RX ORDER — OCTREOTIDE ACETATE 200 UG/ML
200 INJECTION, SOLUTION INTRAVENOUS; SUBCUTANEOUS THREE TIMES A DAY
Refills: 0 | Status: DISCONTINUED | OUTPATIENT
Start: 2023-01-01 | End: 2023-01-01

## 2023-01-01 RX ORDER — LACTULOSE 10 G/15ML
200 SOLUTION ORAL DAILY
Refills: 0 | Status: DISCONTINUED | OUTPATIENT
Start: 2023-01-01 | End: 2023-01-01

## 2023-01-01 RX ORDER — CLOPIDOGREL BISULFATE 75 MG/1
75 TABLET, FILM COATED ORAL DAILY
Refills: 0 | Status: DISCONTINUED | OUTPATIENT
Start: 2023-01-01 | End: 2023-01-01

## 2023-01-01 RX ORDER — FAMOTIDINE 10 MG/ML
0 INJECTION INTRAVENOUS
Qty: 0 | Refills: 0 | DISCHARGE

## 2023-01-01 RX ORDER — SENNA PLUS 8.6 MG/1
2 TABLET ORAL
Qty: 0 | Refills: 0 | DISCHARGE

## 2023-01-01 RX ORDER — INDOMETHACIN 50 MG
100 CAPSULE ORAL ONCE
Refills: 0 | Status: COMPLETED | OUTPATIENT
Start: 2023-01-01 | End: 2023-01-01

## 2023-01-01 RX ORDER — PANTOPRAZOLE SODIUM 20 MG/1
80 TABLET, DELAYED RELEASE ORAL ONCE
Refills: 0 | Status: COMPLETED | OUTPATIENT
Start: 2023-01-01 | End: 2023-01-01

## 2023-01-01 RX ORDER — SODIUM CHLORIDE 9 MG/ML
2000 INJECTION INTRAMUSCULAR; INTRAVENOUS; SUBCUTANEOUS ONCE
Refills: 0 | Status: COMPLETED | OUTPATIENT
Start: 2023-01-01 | End: 2023-01-01

## 2023-01-01 RX ORDER — CIPROFLOXACIN LACTATE 400MG/40ML
200 VIAL (ML) INTRAVENOUS ONCE
Refills: 0 | Status: COMPLETED | OUTPATIENT
Start: 2023-01-01 | End: 2023-01-01

## 2023-01-01 RX ORDER — FENTANYL CITRATE 50 UG/ML
25 INJECTION INTRAVENOUS ONCE
Refills: 0 | Status: DISCONTINUED | OUTPATIENT
Start: 2023-01-01 | End: 2023-01-01

## 2023-01-01 RX ORDER — PIPERACILLIN AND TAZOBACTAM 4; .5 G/20ML; G/20ML
3.38 INJECTION, POWDER, LYOPHILIZED, FOR SOLUTION INTRAVENOUS EVERY 8 HOURS
Refills: 0 | Status: COMPLETED | OUTPATIENT
Start: 2023-01-01 | End: 2023-01-01

## 2023-01-01 RX ORDER — DOCUSATE SODIUM 100 MG
1 CAPSULE ORAL
Qty: 0 | Refills: 0 | DISCHARGE

## 2023-01-01 RX ORDER — ROBINUL 0.2 MG/ML
0.4 INJECTION INTRAMUSCULAR; INTRAVENOUS EVERY 6 HOURS
Refills: 0 | Status: DISCONTINUED | OUTPATIENT
Start: 2023-01-01 | End: 2023-01-01

## 2023-01-01 RX ORDER — POTASSIUM PHOSPHATE, MONOBASIC POTASSIUM PHOSPHATE, DIBASIC 236; 224 MG/ML; MG/ML
30 INJECTION, SOLUTION INTRAVENOUS ONCE
Refills: 0 | Status: DISCONTINUED | OUTPATIENT
Start: 2023-01-01 | End: 2023-01-01

## 2023-01-01 RX ORDER — POTASSIUM CHLORIDE 20 MEQ
20 PACKET (EA) ORAL ONCE
Refills: 0 | Status: COMPLETED | OUTPATIENT
Start: 2023-01-01 | End: 2023-01-01

## 2023-01-01 RX ORDER — MORPHINE SULFATE 50 MG/1
2 CAPSULE, EXTENDED RELEASE ORAL
Refills: 0 | Status: DISCONTINUED | OUTPATIENT
Start: 2023-01-01 | End: 2023-01-01

## 2023-01-01 RX ORDER — LACTULOSE 10 G/15ML
10 SOLUTION ORAL EVERY 8 HOURS
Refills: 0 | Status: DISCONTINUED | OUTPATIENT
Start: 2023-01-01 | End: 2023-01-01

## 2023-01-01 RX ORDER — FUROSEMIDE 40 MG
1 TABLET ORAL
Qty: 0 | Refills: 0 | DISCHARGE

## 2023-01-01 RX ORDER — PROPRANOLOL HCL 160 MG
1 CAPSULE, EXTENDED RELEASE 24HR ORAL
Qty: 0 | Refills: 0 | DISCHARGE

## 2023-01-01 RX ORDER — POTASSIUM PHOSPHATE, MONOBASIC POTASSIUM PHOSPHATE, DIBASIC 236; 224 MG/ML; MG/ML
15 INJECTION, SOLUTION INTRAVENOUS ONCE
Refills: 0 | Status: COMPLETED | OUTPATIENT
Start: 2023-01-01 | End: 2023-01-01

## 2023-01-01 RX ORDER — DEXTROSE 50 % IN WATER 50 %
25 SYRINGE (ML) INTRAVENOUS ONCE
Refills: 0 | Status: COMPLETED | OUTPATIENT
Start: 2023-01-01 | End: 2023-01-01

## 2023-01-01 RX ORDER — SPIRONOLACTONE 25 MG/1
1 TABLET, FILM COATED ORAL
Qty: 0 | Refills: 0 | DISCHARGE

## 2023-01-01 RX ORDER — MUPIROCIN 20 MG/G
1 OINTMENT TOPICAL
Refills: 0 | Status: DISCONTINUED | OUTPATIENT
Start: 2023-01-01 | End: 2023-01-01

## 2023-01-01 RX ORDER — CEFTRIAXONE 500 MG/1
1000 INJECTION, POWDER, FOR SOLUTION INTRAMUSCULAR; INTRAVENOUS EVERY 24 HOURS
Refills: 0 | Status: DISCONTINUED | OUTPATIENT
Start: 2023-01-01 | End: 2023-01-01

## 2023-01-01 RX ADMIN — Medication 50 MILLILITER(S): at 18:35

## 2023-01-01 RX ADMIN — Medication 100 GRAM(S): at 08:11

## 2023-01-01 RX ADMIN — PIPERACILLIN AND TAZOBACTAM 25 GRAM(S): 4; .5 INJECTION, POWDER, LYOPHILIZED, FOR SOLUTION INTRAVENOUS at 05:19

## 2023-01-01 RX ADMIN — PANTOPRAZOLE SODIUM 40 MILLIGRAM(S): 20 TABLET, DELAYED RELEASE ORAL at 18:13

## 2023-01-01 RX ADMIN — OCTREOTIDE ACETATE 10 MICROGRAM(S)/HR: 200 INJECTION, SOLUTION INTRAVENOUS; SUBCUTANEOUS at 08:05

## 2023-01-01 RX ADMIN — FENTANYL CITRATE 25 MICROGRAM(S): 50 INJECTION INTRAVENOUS at 18:45

## 2023-01-01 RX ADMIN — PANTOPRAZOLE SODIUM 40 MILLIGRAM(S): 20 TABLET, DELAYED RELEASE ORAL at 18:47

## 2023-01-01 RX ADMIN — CHLORHEXIDINE GLUCONATE 15 MILLILITER(S): 213 SOLUTION TOPICAL at 05:36

## 2023-01-01 RX ADMIN — POLYETHYLENE GLYCOL 3350 17 GRAM(S): 17 POWDER, FOR SOLUTION ORAL at 17:45

## 2023-01-01 RX ADMIN — MIDODRINE HYDROCHLORIDE 5 MILLIGRAM(S): 2.5 TABLET ORAL at 22:20

## 2023-01-01 RX ADMIN — Medication 50 MILLILITER(S): at 06:02

## 2023-01-01 RX ADMIN — CHLORHEXIDINE GLUCONATE 1 APPLICATION(S): 213 SOLUTION TOPICAL at 13:17

## 2023-01-01 RX ADMIN — PANTOPRAZOLE SODIUM 40 MILLIGRAM(S): 20 TABLET, DELAYED RELEASE ORAL at 17:45

## 2023-01-01 RX ADMIN — SODIUM CHLORIDE 1000 MILLILITER(S): 9 INJECTION INTRAMUSCULAR; INTRAVENOUS; SUBCUTANEOUS at 21:10

## 2023-01-01 RX ADMIN — CHLORHEXIDINE GLUCONATE 15 MILLILITER(S): 213 SOLUTION TOPICAL at 18:36

## 2023-01-01 RX ADMIN — Medication 25 MICROGRAM(S): at 05:50

## 2023-01-01 RX ADMIN — LACTULOSE 30 GRAM(S): 10 SOLUTION ORAL at 08:11

## 2023-01-01 RX ADMIN — PANTOPRAZOLE SODIUM 40 MILLIGRAM(S): 20 TABLET, DELAYED RELEASE ORAL at 06:42

## 2023-01-01 RX ADMIN — Medication 50 MILLILITER(S): at 11:13

## 2023-01-01 RX ADMIN — MIDODRINE HYDROCHLORIDE 5 MILLIGRAM(S): 2.5 TABLET ORAL at 21:04

## 2023-01-01 RX ADMIN — CHLORHEXIDINE GLUCONATE 15 MILLILITER(S): 213 SOLUTION TOPICAL at 05:57

## 2023-01-01 RX ADMIN — CHLORHEXIDINE GLUCONATE 1 APPLICATION(S): 213 SOLUTION TOPICAL at 05:36

## 2023-01-01 RX ADMIN — PIPERACILLIN AND TAZOBACTAM 25 GRAM(S): 4; .5 INJECTION, POWDER, LYOPHILIZED, FOR SOLUTION INTRAVENOUS at 16:48

## 2023-01-01 RX ADMIN — SPIRONOLACTONE 25 MILLIGRAM(S): 25 TABLET, FILM COATED ORAL at 05:52

## 2023-01-01 RX ADMIN — SENNA PLUS 2 TABLET(S): 8.6 TABLET ORAL at 21:25

## 2023-01-01 RX ADMIN — PANTOPRAZOLE SODIUM 10 MG/HR: 20 TABLET, DELAYED RELEASE ORAL at 01:18

## 2023-01-01 RX ADMIN — MIDODRINE HYDROCHLORIDE 5 MILLIGRAM(S): 2.5 TABLET ORAL at 06:46

## 2023-01-01 RX ADMIN — Medication 100 MILLIGRAM(S): at 15:50

## 2023-01-01 RX ADMIN — PIPERACILLIN AND TAZOBACTAM 25 GRAM(S): 4; .5 INJECTION, POWDER, LYOPHILIZED, FOR SOLUTION INTRAVENOUS at 23:01

## 2023-01-01 RX ADMIN — POLYETHYLENE GLYCOL 3350 17 GRAM(S): 17 POWDER, FOR SOLUTION ORAL at 06:42

## 2023-01-01 RX ADMIN — PIPERACILLIN AND TAZOBACTAM 25 GRAM(S): 4; .5 INJECTION, POWDER, LYOPHILIZED, FOR SOLUTION INTRAVENOUS at 17:24

## 2023-01-01 RX ADMIN — OCTREOTIDE ACETATE 10 MICROGRAM(S)/HR: 200 INJECTION, SOLUTION INTRAVENOUS; SUBCUTANEOUS at 06:33

## 2023-01-01 RX ADMIN — CHLORHEXIDINE GLUCONATE 1 APPLICATION(S): 213 SOLUTION TOPICAL at 05:52

## 2023-01-01 RX ADMIN — CHLORHEXIDINE GLUCONATE 1 APPLICATION(S): 213 SOLUTION TOPICAL at 12:14

## 2023-01-01 RX ADMIN — Medication 50 MILLILITER(S): at 06:29

## 2023-01-01 RX ADMIN — Medication 650 MILLIGRAM(S): at 11:30

## 2023-01-01 RX ADMIN — POLYETHYLENE GLYCOL 3350 17 GRAM(S): 17 POWDER, FOR SOLUTION ORAL at 18:09

## 2023-01-01 RX ADMIN — POTASSIUM PHOSPHATE, MONOBASIC POTASSIUM PHOSPHATE, DIBASIC 83.33 MILLIMOLE(S): 236; 224 INJECTION, SOLUTION INTRAVENOUS at 09:20

## 2023-01-01 RX ADMIN — MORPHINE SULFATE 2 MILLIGRAM(S): 50 CAPSULE, EXTENDED RELEASE ORAL at 13:41

## 2023-01-01 RX ADMIN — PANTOPRAZOLE SODIUM 40 MILLIGRAM(S): 20 TABLET, DELAYED RELEASE ORAL at 12:35

## 2023-01-01 RX ADMIN — OCTREOTIDE ACETATE 10 MICROGRAM(S)/HR: 200 INJECTION, SOLUTION INTRAVENOUS; SUBCUTANEOUS at 12:50

## 2023-01-01 RX ADMIN — PROPOFOL 3.92 MICROGRAM(S)/KG/MIN: 10 INJECTION, EMULSION INTRAVENOUS at 23:20

## 2023-01-01 RX ADMIN — PIPERACILLIN AND TAZOBACTAM 25 GRAM(S): 4; .5 INJECTION, POWDER, LYOPHILIZED, FOR SOLUTION INTRAVENOUS at 09:02

## 2023-01-01 RX ADMIN — MIDODRINE HYDROCHLORIDE 5 MILLIGRAM(S): 2.5 TABLET ORAL at 13:37

## 2023-01-01 RX ADMIN — CEFTRIAXONE 100 MILLIGRAM(S): 500 INJECTION, POWDER, FOR SOLUTION INTRAMUSCULAR; INTRAVENOUS at 07:17

## 2023-01-01 RX ADMIN — CEFTRIAXONE 100 MILLIGRAM(S): 500 INJECTION, POWDER, FOR SOLUTION INTRAMUSCULAR; INTRAVENOUS at 06:08

## 2023-01-01 RX ADMIN — SODIUM CHLORIDE 75 MILLILITER(S): 9 INJECTION, SOLUTION INTRAVENOUS at 04:38

## 2023-01-01 RX ADMIN — HYDROMORPHONE HYDROCHLORIDE 0.5 MILLIGRAM(S): 2 INJECTION INTRAMUSCULAR; INTRAVENOUS; SUBCUTANEOUS at 16:15

## 2023-01-01 RX ADMIN — CHLORHEXIDINE GLUCONATE 15 MILLILITER(S): 213 SOLUTION TOPICAL at 17:03

## 2023-01-01 RX ADMIN — Medication 25 MICROGRAM(S): at 06:06

## 2023-01-01 RX ADMIN — Medication 50 MILLIGRAM(S): at 15:00

## 2023-01-01 RX ADMIN — MIDODRINE HYDROCHLORIDE 5 MILLIGRAM(S): 2.5 TABLET ORAL at 21:09

## 2023-01-01 RX ADMIN — Medication 50 MILLILITER(S): at 14:59

## 2023-01-01 RX ADMIN — OCTREOTIDE ACETATE 200 MICROGRAM(S): 200 INJECTION, SOLUTION INTRAVENOUS; SUBCUTANEOUS at 14:49

## 2023-01-01 RX ADMIN — MIDODRINE HYDROCHLORIDE 5 MILLIGRAM(S): 2.5 TABLET ORAL at 05:58

## 2023-01-01 RX ADMIN — Medication 50 MILLILITER(S): at 23:40

## 2023-01-01 RX ADMIN — Medication 20 MICROGRAM(S): at 22:20

## 2023-01-01 RX ADMIN — Medication 25 MICROGRAM(S): at 12:48

## 2023-01-01 RX ADMIN — Medication 100 GRAM(S): at 05:36

## 2023-01-01 RX ADMIN — OCTREOTIDE ACETATE 10 MICROGRAM(S)/HR: 200 INJECTION, SOLUTION INTRAVENOUS; SUBCUTANEOUS at 17:45

## 2023-01-01 RX ADMIN — CEFTRIAXONE 100 MILLIGRAM(S): 500 INJECTION, POWDER, FOR SOLUTION INTRAMUSCULAR; INTRAVENOUS at 12:34

## 2023-01-01 RX ADMIN — FENTANYL CITRATE 25 MICROGRAM(S): 50 INJECTION INTRAVENOUS at 18:00

## 2023-01-01 RX ADMIN — LACTULOSE 30 GRAM(S): 10 SOLUTION ORAL at 05:13

## 2023-01-01 RX ADMIN — Medication 650 MILLIGRAM(S): at 21:43

## 2023-01-01 RX ADMIN — Medication 50 MILLILITER(S): at 15:58

## 2023-01-01 RX ADMIN — PIPERACILLIN AND TAZOBACTAM 25 GRAM(S): 4; .5 INJECTION, POWDER, LYOPHILIZED, FOR SOLUTION INTRAVENOUS at 13:13

## 2023-01-01 RX ADMIN — CHLORHEXIDINE GLUCONATE 15 MILLILITER(S): 213 SOLUTION TOPICAL at 18:47

## 2023-01-01 RX ADMIN — MIDODRINE HYDROCHLORIDE 5 MILLIGRAM(S): 2.5 TABLET ORAL at 06:07

## 2023-01-01 RX ADMIN — CHLORHEXIDINE GLUCONATE 15 MILLILITER(S): 213 SOLUTION TOPICAL at 05:31

## 2023-01-01 RX ADMIN — CHLORHEXIDINE GLUCONATE 1 APPLICATION(S): 213 SOLUTION TOPICAL at 12:55

## 2023-01-01 RX ADMIN — Medication 12.1 MICROGRAM(S)/KG/MIN: at 18:32

## 2023-01-01 RX ADMIN — Medication 325 MILLIGRAM(S): at 22:17

## 2023-01-01 RX ADMIN — CHLORHEXIDINE GLUCONATE 1 APPLICATION(S): 213 SOLUTION TOPICAL at 05:26

## 2023-01-01 RX ADMIN — PANTOPRAZOLE SODIUM 40 MILLIGRAM(S): 20 TABLET, DELAYED RELEASE ORAL at 05:50

## 2023-01-01 RX ADMIN — MORPHINE SULFATE 2 MILLIGRAM(S): 50 CAPSULE, EXTENDED RELEASE ORAL at 13:11

## 2023-01-01 RX ADMIN — CHLORHEXIDINE GLUCONATE 1 APPLICATION(S): 213 SOLUTION TOPICAL at 11:32

## 2023-01-01 RX ADMIN — ETOMIDATE 20 MILLIGRAM(S): 2 INJECTION INTRAVENOUS at 17:59

## 2023-01-01 RX ADMIN — PANTOPRAZOLE SODIUM 40 MILLIGRAM(S): 20 TABLET, DELAYED RELEASE ORAL at 11:58

## 2023-01-01 RX ADMIN — MUPIROCIN 1 APPLICATION(S): 20 OINTMENT TOPICAL at 05:52

## 2023-01-01 RX ADMIN — MUPIROCIN 1 APPLICATION(S): 20 OINTMENT TOPICAL at 05:27

## 2023-01-01 RX ADMIN — PANTOPRAZOLE SODIUM 40 MILLIGRAM(S): 20 TABLET, DELAYED RELEASE ORAL at 05:31

## 2023-01-01 RX ADMIN — MIDODRINE HYDROCHLORIDE 5 MILLIGRAM(S): 2.5 TABLET ORAL at 16:01

## 2023-01-01 RX ADMIN — Medication 50 MILLILITER(S): at 21:05

## 2023-01-01 RX ADMIN — PIPERACILLIN AND TAZOBACTAM 25 GRAM(S): 4; .5 INJECTION, POWDER, LYOPHILIZED, FOR SOLUTION INTRAVENOUS at 10:00

## 2023-01-01 RX ADMIN — PANTOPRAZOLE SODIUM 10 MG/HR: 20 TABLET, DELAYED RELEASE ORAL at 05:19

## 2023-01-01 RX ADMIN — Medication 1 MILLIGRAM(S): at 09:35

## 2023-01-01 RX ADMIN — OCTREOTIDE ACETATE 10 MICROGRAM(S)/HR: 200 INJECTION, SOLUTION INTRAVENOUS; SUBCUTANEOUS at 16:13

## 2023-01-01 RX ADMIN — SENNA PLUS 2 TABLET(S): 8.6 TABLET ORAL at 21:30

## 2023-01-01 RX ADMIN — LACTULOSE 200 GRAM(S): 10 SOLUTION ORAL at 12:41

## 2023-01-01 RX ADMIN — CHLORHEXIDINE GLUCONATE 1 APPLICATION(S): 213 SOLUTION TOPICAL at 05:14

## 2023-01-01 RX ADMIN — Medication 400 MILLIGRAM(S): at 21:24

## 2023-01-01 RX ADMIN — PANTOPRAZOLE SODIUM 80 MILLIGRAM(S): 20 TABLET, DELAYED RELEASE ORAL at 12:21

## 2023-01-01 RX ADMIN — PIPERACILLIN AND TAZOBACTAM 25 GRAM(S): 4; .5 INJECTION, POWDER, LYOPHILIZED, FOR SOLUTION INTRAVENOUS at 00:45

## 2023-01-01 RX ADMIN — Medication 50 MILLILITER(S): at 21:11

## 2023-01-01 RX ADMIN — CHLORHEXIDINE GLUCONATE 1 APPLICATION(S): 213 SOLUTION TOPICAL at 11:59

## 2023-01-01 RX ADMIN — PANTOPRAZOLE SODIUM 40 MILLIGRAM(S): 20 TABLET, DELAYED RELEASE ORAL at 21:04

## 2023-01-01 RX ADMIN — Medication 400 MILLIGRAM(S): at 22:06

## 2023-01-01 RX ADMIN — SENNA PLUS 2 TABLET(S): 8.6 TABLET ORAL at 22:23

## 2023-01-01 RX ADMIN — PANTOPRAZOLE SODIUM 40 MILLIGRAM(S): 20 TABLET, DELAYED RELEASE ORAL at 11:51

## 2023-01-01 RX ADMIN — CHLORHEXIDINE GLUCONATE 1 APPLICATION(S): 213 SOLUTION TOPICAL at 12:34

## 2023-01-01 RX ADMIN — SODIUM CHLORIDE 1000 MILLILITER(S): 9 INJECTION INTRAMUSCULAR; INTRAVENOUS; SUBCUTANEOUS at 12:20

## 2023-01-01 RX ADMIN — SODIUM CHLORIDE 1000 MILLILITER(S): 9 INJECTION INTRAMUSCULAR; INTRAVENOUS; SUBCUTANEOUS at 21:51

## 2023-01-01 RX ADMIN — ENOXAPARIN SODIUM 40 MILLIGRAM(S): 100 INJECTION SUBCUTANEOUS at 18:09

## 2023-01-01 RX ADMIN — Medication 100 MILLIEQUIVALENT(S): at 23:21

## 2023-01-01 RX ADMIN — MUPIROCIN 1 APPLICATION(S): 20 OINTMENT TOPICAL at 18:46

## 2023-01-01 RX ADMIN — CHLORHEXIDINE GLUCONATE 1 APPLICATION(S): 213 SOLUTION TOPICAL at 12:20

## 2023-01-01 RX ADMIN — OCTREOTIDE ACETATE 10 MICROGRAM(S)/HR: 200 INJECTION, SOLUTION INTRAVENOUS; SUBCUTANEOUS at 17:05

## 2023-01-01 RX ADMIN — Medication 50 MILLILITER(S): at 05:56

## 2023-01-01 RX ADMIN — OCTREOTIDE ACETATE 200 MICROGRAM(S): 200 INJECTION, SOLUTION INTRAVENOUS; SUBCUTANEOUS at 13:51

## 2023-01-01 RX ADMIN — PIPERACILLIN AND TAZOBACTAM 25 GRAM(S): 4; .5 INJECTION, POWDER, LYOPHILIZED, FOR SOLUTION INTRAVENOUS at 06:42

## 2023-01-01 RX ADMIN — MIDODRINE HYDROCHLORIDE 5 MILLIGRAM(S): 2.5 TABLET ORAL at 21:23

## 2023-01-01 RX ADMIN — PANTOPRAZOLE SODIUM 40 MILLIGRAM(S): 20 TABLET, DELAYED RELEASE ORAL at 12:20

## 2023-01-01 RX ADMIN — INSULIN HUMAN 10 UNIT(S): 100 INJECTION, SOLUTION SUBCUTANEOUS at 15:04

## 2023-01-01 RX ADMIN — MIDODRINE HYDROCHLORIDE 5 MILLIGRAM(S): 2.5 TABLET ORAL at 14:49

## 2023-01-01 RX ADMIN — Medication 650 MILLIGRAM(S): at 22:01

## 2023-01-01 RX ADMIN — MIDODRINE HYDROCHLORIDE 5 MILLIGRAM(S): 2.5 TABLET ORAL at 05:56

## 2023-01-01 RX ADMIN — Medication 650 MILLIGRAM(S): at 21:13

## 2023-01-01 RX ADMIN — OCTREOTIDE ACETATE 10 MICROGRAM(S)/HR: 200 INJECTION, SOLUTION INTRAVENOUS; SUBCUTANEOUS at 12:28

## 2023-01-01 RX ADMIN — PIPERACILLIN AND TAZOBACTAM 25 GRAM(S): 4; .5 INJECTION, POWDER, LYOPHILIZED, FOR SOLUTION INTRAVENOUS at 01:18

## 2023-01-01 RX ADMIN — MIDODRINE HYDROCHLORIDE 5 MILLIGRAM(S): 2.5 TABLET ORAL at 06:42

## 2023-01-01 RX ADMIN — CEFTRIAXONE 100 MILLIGRAM(S): 500 INJECTION, POWDER, FOR SOLUTION INTRAMUSCULAR; INTRAVENOUS at 06:56

## 2023-01-01 RX ADMIN — SODIUM CHLORIDE 60 MILLILITER(S): 9 INJECTION, SOLUTION INTRAVENOUS at 10:58

## 2023-01-01 RX ADMIN — Medication 25 MILLILITER(S): at 22:15

## 2023-01-01 RX ADMIN — Medication 25 MICROGRAM(S): at 06:42

## 2023-01-01 RX ADMIN — CHLORHEXIDINE GLUCONATE 1 APPLICATION(S): 213 SOLUTION TOPICAL at 06:42

## 2023-01-01 RX ADMIN — MUPIROCIN 1 APPLICATION(S): 20 OINTMENT TOPICAL at 17:08

## 2023-01-01 RX ADMIN — PANTOPRAZOLE SODIUM 10 MG/HR: 20 TABLET, DELAYED RELEASE ORAL at 06:56

## 2023-01-01 RX ADMIN — PROPOFOL 3.92 MICROGRAM(S)/KG/MIN: 10 INJECTION, EMULSION INTRAVENOUS at 01:39

## 2023-01-01 RX ADMIN — CHLORHEXIDINE GLUCONATE 15 MILLILITER(S): 213 SOLUTION TOPICAL at 17:13

## 2023-01-01 RX ADMIN — PIPERACILLIN AND TAZOBACTAM 25 GRAM(S): 4; .5 INJECTION, POWDER, LYOPHILIZED, FOR SOLUTION INTRAVENOUS at 17:03

## 2023-01-01 RX ADMIN — LACTULOSE 10 GRAM(S): 10 SOLUTION ORAL at 11:51

## 2023-01-01 RX ADMIN — SODIUM POLYSTYRENE SULFONATE 15 GRAM(S): 4.1 POWDER, FOR SUSPENSION ORAL at 21:50

## 2023-01-01 RX ADMIN — POTASSIUM PHOSPHATE, MONOBASIC POTASSIUM PHOSPHATE, DIBASIC 83.33 MILLIMOLE(S): 236; 224 INJECTION, SOLUTION INTRAVENOUS at 10:58

## 2023-01-01 RX ADMIN — LACTULOSE 30 GRAM(S): 10 SOLUTION ORAL at 05:37

## 2023-01-01 RX ADMIN — MORPHINE SULFATE 4 MILLIGRAM(S): 50 CAPSULE, EXTENDED RELEASE ORAL at 11:02

## 2023-01-01 RX ADMIN — MUPIROCIN 1 APPLICATION(S): 20 OINTMENT TOPICAL at 17:45

## 2023-01-01 RX ADMIN — CHLORHEXIDINE GLUCONATE 15 MILLILITER(S): 213 SOLUTION TOPICAL at 18:07

## 2023-01-01 RX ADMIN — Medication 100 MILLIEQUIVALENT(S): at 22:10

## 2023-01-01 RX ADMIN — Medication 25 MICROGRAM(S): at 05:28

## 2023-01-01 RX ADMIN — SODIUM CHLORIDE 1000 MILLILITER(S): 9 INJECTION INTRAMUSCULAR; INTRAVENOUS; SUBCUTANEOUS at 10:32

## 2023-01-01 RX ADMIN — Medication 102 MILLIGRAM(S): at 11:32

## 2023-01-01 RX ADMIN — PIPERACILLIN AND TAZOBACTAM 25 GRAM(S): 4; .5 INJECTION, POWDER, LYOPHILIZED, FOR SOLUTION INTRAVENOUS at 17:04

## 2023-01-01 RX ADMIN — Medication 20 MICROGRAM(S): at 21:41

## 2023-01-01 RX ADMIN — SODIUM CHLORIDE 50 MILLILITER(S): 9 INJECTION, SOLUTION INTRAVENOUS at 05:51

## 2023-01-01 RX ADMIN — CHLORHEXIDINE GLUCONATE 1 APPLICATION(S): 213 SOLUTION TOPICAL at 12:44

## 2023-01-01 RX ADMIN — FENTANYL CITRATE 25 MICROGRAM(S): 50 INJECTION INTRAVENOUS at 17:47

## 2023-01-01 RX ADMIN — Medication 50 MILLILITER(S): at 05:38

## 2023-01-01 RX ADMIN — POTASSIUM PHOSPHATE, MONOBASIC POTASSIUM PHOSPHATE, DIBASIC 83.33 MILLIMOLE(S): 236; 224 INJECTION, SOLUTION INTRAVENOUS at 10:31

## 2023-01-01 RX ADMIN — CHLORHEXIDINE GLUCONATE 15 MILLILITER(S): 213 SOLUTION TOPICAL at 05:37

## 2023-01-01 RX ADMIN — Medication 100 MILLIEQUIVALENT(S): at 06:05

## 2023-01-01 RX ADMIN — Medication 102 MILLIGRAM(S): at 09:20

## 2023-01-01 RX ADMIN — MIDODRINE HYDROCHLORIDE 5 MILLIGRAM(S): 2.5 TABLET ORAL at 23:40

## 2023-01-01 RX ADMIN — MORPHINE SULFATE 4 MILLIGRAM(S): 50 CAPSULE, EXTENDED RELEASE ORAL at 10:32

## 2023-01-01 RX ADMIN — FAMOTIDINE 20 MILLIGRAM(S): 10 INJECTION INTRAVENOUS at 22:17

## 2023-01-01 RX ADMIN — Medication 25 MICROGRAM(S): at 05:31

## 2023-01-01 RX ADMIN — HYDROMORPHONE HYDROCHLORIDE 0.5 MILLIGRAM(S): 2 INJECTION INTRAMUSCULAR; INTRAVENOUS; SUBCUTANEOUS at 10:05

## 2023-01-01 RX ADMIN — SODIUM CHLORIDE 1000 MILLILITER(S): 9 INJECTION INTRAMUSCULAR; INTRAVENOUS; SUBCUTANEOUS at 01:23

## 2023-01-01 RX ADMIN — Medication 200 GRAM(S): at 15:06

## 2023-01-01 RX ADMIN — INSULIN HUMAN 5 UNIT(S): 100 INJECTION, SOLUTION SUBCUTANEOUS at 22:08

## 2023-01-01 RX ADMIN — HYDROMORPHONE HYDROCHLORIDE 0.2 MILLIGRAM(S): 2 INJECTION INTRAMUSCULAR; INTRAVENOUS; SUBCUTANEOUS at 21:50

## 2023-01-01 RX ADMIN — PROPOFOL 3.92 MICROGRAM(S)/KG/MIN: 10 INJECTION, EMULSION INTRAVENOUS at 17:03

## 2023-01-01 RX ADMIN — OCTREOTIDE ACETATE 10 MICROGRAM(S)/HR: 200 INJECTION, SOLUTION INTRAVENOUS; SUBCUTANEOUS at 02:06

## 2023-01-01 RX ADMIN — CHLORHEXIDINE GLUCONATE 15 MILLILITER(S): 213 SOLUTION TOPICAL at 06:42

## 2023-01-01 RX ADMIN — Medication 25 MICROGRAM(S): at 05:52

## 2023-01-01 RX ADMIN — MUPIROCIN 1 APPLICATION(S): 20 OINTMENT TOPICAL at 17:03

## 2023-01-01 RX ADMIN — Medication 50 MILLILITER(S): at 13:04

## 2023-01-01 RX ADMIN — PIPERACILLIN AND TAZOBACTAM 25 GRAM(S): 4; .5 INJECTION, POWDER, LYOPHILIZED, FOR SOLUTION INTRAVENOUS at 18:40

## 2023-01-01 RX ADMIN — Medication 50 MILLILITER(S): at 18:18

## 2023-01-01 RX ADMIN — Medication 25 GRAM(S): at 22:25

## 2023-01-01 RX ADMIN — POLYETHYLENE GLYCOL 3350 17 GRAM(S): 17 POWDER, FOR SOLUTION ORAL at 05:20

## 2023-01-01 RX ADMIN — Medication 50 MILLILITER(S): at 08:39

## 2023-01-01 RX ADMIN — ONDANSETRON 4 MILLIGRAM(S): 8 TABLET, FILM COATED ORAL at 10:33

## 2023-01-01 RX ADMIN — LACTULOSE 10 GRAM(S): 10 SOLUTION ORAL at 11:24

## 2023-01-01 RX ADMIN — PIPERACILLIN AND TAZOBACTAM 25 GRAM(S): 4; .5 INJECTION, POWDER, LYOPHILIZED, FOR SOLUTION INTRAVENOUS at 09:04

## 2023-01-01 RX ADMIN — CHLORHEXIDINE GLUCONATE 15 MILLILITER(S): 213 SOLUTION TOPICAL at 18:15

## 2023-01-01 RX ADMIN — CHLORHEXIDINE GLUCONATE 15 MILLILITER(S): 213 SOLUTION TOPICAL at 06:29

## 2023-01-01 RX ADMIN — CHLORHEXIDINE GLUCONATE 15 MILLILITER(S): 213 SOLUTION TOPICAL at 17:43

## 2023-01-01 RX ADMIN — MIDODRINE HYDROCHLORIDE 5 MILLIGRAM(S): 2.5 TABLET ORAL at 13:51

## 2023-01-01 RX ADMIN — MUPIROCIN 1 APPLICATION(S): 20 OINTMENT TOPICAL at 05:31

## 2023-01-01 RX ADMIN — SODIUM CHLORIDE 2000 MILLILITER(S): 9 INJECTION INTRAMUSCULAR; INTRAVENOUS; SUBCUTANEOUS at 13:10

## 2023-01-01 RX ADMIN — POTASSIUM PHOSPHATE, MONOBASIC POTASSIUM PHOSPHATE, DIBASIC 62.5 MILLIMOLE(S): 236; 224 INJECTION, SOLUTION INTRAVENOUS at 16:39

## 2023-01-01 RX ADMIN — Medication 6.07 MICROGRAM(S)/KG/MIN: at 23:18

## 2023-01-01 RX ADMIN — Medication 650 MILLIGRAM(S): at 19:49

## 2023-01-01 RX ADMIN — PIPERACILLIN AND TAZOBACTAM 25 GRAM(S): 4; .5 INJECTION, POWDER, LYOPHILIZED, FOR SOLUTION INTRAVENOUS at 16:12

## 2023-01-01 RX ADMIN — Medication 50 MILLILITER(S): at 11:42

## 2023-01-01 RX ADMIN — PANTOPRAZOLE SODIUM 40 MILLIGRAM(S): 20 TABLET, DELAYED RELEASE ORAL at 11:24

## 2023-01-01 RX ADMIN — Medication 20 MILLIEQUIVALENT(S): at 06:02

## 2023-01-01 RX ADMIN — PANTOPRAZOLE SODIUM 40 MILLIGRAM(S): 20 TABLET, DELAYED RELEASE ORAL at 17:03

## 2023-01-01 RX ADMIN — OCTREOTIDE ACETATE 10 MICROGRAM(S)/HR: 200 INJECTION, SOLUTION INTRAVENOUS; SUBCUTANEOUS at 22:20

## 2023-01-01 RX ADMIN — Medication 50 MILLILITER(S): at 13:49

## 2023-01-01 RX ADMIN — MIDODRINE HYDROCHLORIDE 5 MILLIGRAM(S): 2.5 TABLET ORAL at 05:25

## 2023-01-01 RX ADMIN — CHLORHEXIDINE GLUCONATE 15 MILLILITER(S): 213 SOLUTION TOPICAL at 17:29

## 2023-01-01 RX ADMIN — Medication 50 MILLILITER(S): at 21:45

## 2023-01-01 RX ADMIN — PANTOPRAZOLE SODIUM 10 MG/HR: 20 TABLET, DELAYED RELEASE ORAL at 18:29

## 2023-01-01 RX ADMIN — LACTULOSE 30 GRAM(S): 10 SOLUTION ORAL at 18:31

## 2023-01-01 RX ADMIN — Medication 100 MILLIEQUIVALENT(S): at 00:11

## 2023-01-01 RX ADMIN — SODIUM CHLORIDE 100 MILLILITER(S): 9 INJECTION, SOLUTION INTRAVENOUS at 15:29

## 2023-01-01 RX ADMIN — CHLORHEXIDINE GLUCONATE 1 APPLICATION(S): 213 SOLUTION TOPICAL at 16:47

## 2023-01-01 RX ADMIN — MUPIROCIN 1 APPLICATION(S): 20 OINTMENT TOPICAL at 17:15

## 2023-01-01 RX ADMIN — PANTOPRAZOLE SODIUM 10 MG/HR: 20 TABLET, DELAYED RELEASE ORAL at 16:42

## 2023-01-01 RX ADMIN — Medication 650 MILLIGRAM(S): at 12:48

## 2023-01-01 RX ADMIN — OCTREOTIDE ACETATE 200 MICROGRAM(S): 200 INJECTION, SOLUTION INTRAVENOUS; SUBCUTANEOUS at 21:09

## 2023-01-01 RX ADMIN — OCTREOTIDE ACETATE 200 MICROGRAM(S): 200 INJECTION, SOLUTION INTRAVENOUS; SUBCUTANEOUS at 06:07

## 2023-01-01 RX ADMIN — Medication 50 MILLILITER(S): at 12:16

## 2023-01-01 RX ADMIN — PANTOPRAZOLE SODIUM 80 MILLIGRAM(S): 20 TABLET, DELAYED RELEASE ORAL at 01:07

## 2023-01-01 RX ADMIN — FENTANYL CITRATE 25 MICROGRAM(S): 50 INJECTION INTRAVENOUS at 18:31

## 2023-01-01 RX ADMIN — PIPERACILLIN AND TAZOBACTAM 25 GRAM(S): 4; .5 INJECTION, POWDER, LYOPHILIZED, FOR SOLUTION INTRAVENOUS at 00:35

## 2023-01-01 RX ADMIN — Medication 650 MILLIGRAM(S): at 21:31

## 2023-01-01 RX ADMIN — CHLORHEXIDINE GLUCONATE 15 MILLILITER(S): 213 SOLUTION TOPICAL at 15:20

## 2023-01-01 RX ADMIN — Medication 125 MILLILITER(S): at 15:13

## 2023-01-01 RX ADMIN — PANTOPRAZOLE SODIUM 40 MILLIGRAM(S): 20 TABLET, DELAYED RELEASE ORAL at 11:13

## 2023-01-01 RX ADMIN — OCTREOTIDE ACETATE 200 MICROGRAM(S): 200 INJECTION, SOLUTION INTRAVENOUS; SUBCUTANEOUS at 06:46

## 2023-01-01 RX ADMIN — PIPERACILLIN AND TAZOBACTAM 25 GRAM(S): 4; .5 INJECTION, POWDER, LYOPHILIZED, FOR SOLUTION INTRAVENOUS at 10:03

## 2023-01-01 RX ADMIN — Medication 3.03 MICROGRAM(S)/KG/MIN: at 19:21

## 2023-01-01 RX ADMIN — SODIUM CHLORIDE 60 MILLILITER(S): 9 INJECTION, SOLUTION INTRAVENOUS at 22:20

## 2023-01-01 RX ADMIN — CHLORHEXIDINE GLUCONATE 1 APPLICATION(S): 213 SOLUTION TOPICAL at 05:48

## 2023-01-01 RX ADMIN — OCTREOTIDE ACETATE 10 MICROGRAM(S)/HR: 200 INJECTION, SOLUTION INTRAVENOUS; SUBCUTANEOUS at 21:39

## 2023-01-01 RX ADMIN — SODIUM CHLORIDE 50 MILLILITER(S): 9 INJECTION, SOLUTION INTRAVENOUS at 11:51

## 2023-01-01 RX ADMIN — MUPIROCIN 1 APPLICATION(S): 20 OINTMENT TOPICAL at 05:50

## 2023-01-01 RX ADMIN — CHLORHEXIDINE GLUCONATE 1 APPLICATION(S): 213 SOLUTION TOPICAL at 05:30

## 2023-01-01 RX ADMIN — PIPERACILLIN AND TAZOBACTAM 25 GRAM(S): 4; .5 INJECTION, POWDER, LYOPHILIZED, FOR SOLUTION INTRAVENOUS at 17:58

## 2023-01-01 RX ADMIN — SENNA PLUS 2 TABLET(S): 8.6 TABLET ORAL at 21:24

## 2023-01-01 RX ADMIN — CEFTRIAXONE 100 MILLIGRAM(S): 500 INJECTION, POWDER, FOR SOLUTION INTRAMUSCULAR; INTRAVENOUS at 06:32

## 2023-01-01 RX ADMIN — OCTREOTIDE ACETATE 10 MICROGRAM(S)/HR: 200 INJECTION, SOLUTION INTRAVENOUS; SUBCUTANEOUS at 09:30

## 2023-01-01 RX ADMIN — CHLORHEXIDINE GLUCONATE 15 MILLILITER(S): 213 SOLUTION TOPICAL at 05:55

## 2023-01-01 RX ADMIN — CHLORHEXIDINE GLUCONATE 15 MILLILITER(S): 213 SOLUTION TOPICAL at 17:04

## 2023-01-01 RX ADMIN — PIPERACILLIN AND TAZOBACTAM 25 GRAM(S): 4; .5 INJECTION, POWDER, LYOPHILIZED, FOR SOLUTION INTRAVENOUS at 02:12

## 2023-01-01 RX ADMIN — Medication 50 MILLILITER(S): at 06:42

## 2023-01-01 RX ADMIN — PIPERACILLIN AND TAZOBACTAM 25 GRAM(S): 4; .5 INJECTION, POWDER, LYOPHILIZED, FOR SOLUTION INTRAVENOUS at 09:36

## 2023-01-01 RX ADMIN — POLYETHYLENE GLYCOL 3350 17 GRAM(S): 17 POWDER, FOR SOLUTION ORAL at 17:03

## 2023-01-01 RX ADMIN — OCTREOTIDE ACETATE 10 MICROGRAM(S)/HR: 200 INJECTION, SOLUTION INTRAVENOUS; SUBCUTANEOUS at 06:56

## 2023-01-01 RX ADMIN — PANTOPRAZOLE SODIUM 40 MILLIGRAM(S): 20 TABLET, DELAYED RELEASE ORAL at 11:16

## 2023-01-01 RX ADMIN — MIDODRINE HYDROCHLORIDE 5 MILLIGRAM(S): 2.5 TABLET ORAL at 22:09

## 2023-01-01 RX ADMIN — Medication 400 MILLIGRAM(S): at 10:06

## 2023-01-01 RX ADMIN — SODIUM ZIRCONIUM CYCLOSILICATE 10 GRAM(S): 10 POWDER, FOR SUSPENSION ORAL at 19:39

## 2023-01-01 RX ADMIN — Medication 100 GRAM(S): at 09:30

## 2023-01-01 RX ADMIN — PIPERACILLIN AND TAZOBACTAM 25 GRAM(S): 4; .5 INJECTION, POWDER, LYOPHILIZED, FOR SOLUTION INTRAVENOUS at 18:07

## 2023-01-01 RX ADMIN — OCTREOTIDE ACETATE 10 MICROGRAM(S)/HR: 200 INJECTION, SOLUTION INTRAVENOUS; SUBCUTANEOUS at 22:08

## 2023-01-01 RX ADMIN — MIDODRINE HYDROCHLORIDE 5 MILLIGRAM(S): 2.5 TABLET ORAL at 21:45

## 2023-01-01 RX ADMIN — MIDODRINE HYDROCHLORIDE 5 MILLIGRAM(S): 2.5 TABLET ORAL at 13:22

## 2023-01-01 RX ADMIN — CEFTRIAXONE 100 MILLIGRAM(S): 500 INJECTION, POWDER, FOR SOLUTION INTRAMUSCULAR; INTRAVENOUS at 12:55

## 2023-01-01 RX ADMIN — SODIUM CHLORIDE 60 MILLILITER(S): 9 INJECTION, SOLUTION INTRAVENOUS at 17:41

## 2023-01-01 RX ADMIN — LACTULOSE 30 GRAM(S): 10 SOLUTION ORAL at 21:11

## 2023-01-01 RX ADMIN — PIPERACILLIN AND TAZOBACTAM 25 GRAM(S): 4; .5 INJECTION, POWDER, LYOPHILIZED, FOR SOLUTION INTRAVENOUS at 08:42

## 2023-01-01 RX ADMIN — CHLORHEXIDINE GLUCONATE 15 MILLILITER(S): 213 SOLUTION TOPICAL at 05:30

## 2023-01-01 RX ADMIN — Medication 50 MILLILITER(S): at 21:02

## 2023-01-01 RX ADMIN — PANTOPRAZOLE SODIUM 40 MILLIGRAM(S): 20 TABLET, DELAYED RELEASE ORAL at 05:28

## 2023-01-01 RX ADMIN — CHLORHEXIDINE GLUCONATE 1 APPLICATION(S): 213 SOLUTION TOPICAL at 12:02

## 2023-01-01 RX ADMIN — Medication 0.5 MILLIGRAM(S): at 05:12

## 2023-01-01 RX ADMIN — Medication 50 MILLILITER(S): at 23:20

## 2023-01-01 RX ADMIN — PROPOFOL 3.92 MICROGRAM(S)/KG/MIN: 10 INJECTION, EMULSION INTRAVENOUS at 21:00

## 2023-01-01 RX ADMIN — Medication 3.03 MICROGRAM(S)/KG/MIN: at 12:42

## 2023-01-01 RX ADMIN — FENTANYL CITRATE 3.27 MICROGRAM(S)/KG/HR: 50 INJECTION INTRAVENOUS at 17:59

## 2023-01-01 RX ADMIN — PROPOFOL 3.92 MICROGRAM(S)/KG/MIN: 10 INJECTION, EMULSION INTRAVENOUS at 17:59

## 2023-01-01 RX ADMIN — PIPERACILLIN AND TAZOBACTAM 25 GRAM(S): 4; .5 INJECTION, POWDER, LYOPHILIZED, FOR SOLUTION INTRAVENOUS at 00:19

## 2023-01-01 RX ADMIN — PIPERACILLIN AND TAZOBACTAM 200 GRAM(S): 4; .5 INJECTION, POWDER, LYOPHILIZED, FOR SOLUTION INTRAVENOUS at 15:21

## 2023-01-01 RX ADMIN — PANTOPRAZOLE SODIUM 40 MILLIGRAM(S): 20 TABLET, DELAYED RELEASE ORAL at 17:15

## 2023-01-01 RX ADMIN — Medication 20 MICROGRAM(S): at 21:14

## 2023-01-01 RX ADMIN — HYDROMORPHONE HYDROCHLORIDE 0.5 MILLIGRAM(S): 2 INJECTION INTRAMUSCULAR; INTRAVENOUS; SUBCUTANEOUS at 16:45

## 2023-01-01 RX ADMIN — PROPOFOL 3.92 MICROGRAM(S)/KG/MIN: 10 INJECTION, EMULSION INTRAVENOUS at 08:54

## 2023-01-01 RX ADMIN — PANTOPRAZOLE SODIUM 40 MILLIGRAM(S): 20 TABLET, DELAYED RELEASE ORAL at 12:55

## 2023-01-01 RX ADMIN — LACTULOSE 30 GRAM(S): 10 SOLUTION ORAL at 21:04

## 2023-01-01 RX ADMIN — Medication 100 MILLIGRAM(S): at 15:00

## 2023-01-01 RX ADMIN — MIDODRINE HYDROCHLORIDE 5 MILLIGRAM(S): 2.5 TABLET ORAL at 13:05

## 2023-01-01 RX ADMIN — CHLORHEXIDINE GLUCONATE 1 APPLICATION(S): 213 SOLUTION TOPICAL at 05:31

## 2023-01-01 RX ADMIN — PIPERACILLIN AND TAZOBACTAM 25 GRAM(S): 4; .5 INJECTION, POWDER, LYOPHILIZED, FOR SOLUTION INTRAVENOUS at 00:11

## 2023-01-01 RX ADMIN — PIPERACILLIN AND TAZOBACTAM 25 GRAM(S): 4; .5 INJECTION, POWDER, LYOPHILIZED, FOR SOLUTION INTRAVENOUS at 17:13

## 2023-01-01 RX ADMIN — ONDANSETRON 4 MILLIGRAM(S): 8 TABLET, FILM COATED ORAL at 13:11

## 2023-01-01 RX ADMIN — MUPIROCIN 1 APPLICATION(S): 20 OINTMENT TOPICAL at 05:18

## 2023-01-01 RX ADMIN — Medication 50 MILLILITER(S): at 14:25

## 2023-01-01 RX ADMIN — ROBINUL 0.4 MILLIGRAM(S): 0.2 INJECTION INTRAMUSCULAR; INTRAVENOUS at 09:34

## 2023-01-01 RX ADMIN — Medication 100 GRAM(S): at 16:38

## 2023-01-01 RX ADMIN — CHLORHEXIDINE GLUCONATE 15 MILLILITER(S): 213 SOLUTION TOPICAL at 05:13

## 2023-01-01 RX ADMIN — HYDROMORPHONE HYDROCHLORIDE 0.2 MILLIGRAM(S): 2 INJECTION INTRAMUSCULAR; INTRAVENOUS; SUBCUTANEOUS at 21:24

## 2023-01-01 RX ADMIN — CHLORHEXIDINE GLUCONATE 1 APPLICATION(S): 213 SOLUTION TOPICAL at 05:19

## 2023-01-01 RX ADMIN — OCTREOTIDE ACETATE 200 MICROGRAM(S): 200 INJECTION, SOLUTION INTRAVENOUS; SUBCUTANEOUS at 21:28

## 2023-01-01 RX ADMIN — CHLORHEXIDINE GLUCONATE 15 MILLILITER(S): 213 SOLUTION TOPICAL at 06:46

## 2023-01-01 RX ADMIN — CHLORHEXIDINE GLUCONATE 1 APPLICATION(S): 213 SOLUTION TOPICAL at 16:40

## 2023-01-01 RX ADMIN — MUPIROCIN 1 APPLICATION(S): 20 OINTMENT TOPICAL at 17:05

## 2023-01-01 RX ADMIN — PIPERACILLIN AND TAZOBACTAM 25 GRAM(S): 4; .5 INJECTION, POWDER, LYOPHILIZED, FOR SOLUTION INTRAVENOUS at 08:05

## 2023-01-01 RX ADMIN — Medication 100 MILLIEQUIVALENT(S): at 05:36

## 2023-01-01 RX ADMIN — CEFTRIAXONE 100 MILLIGRAM(S): 500 INJECTION, POWDER, FOR SOLUTION INTRAMUSCULAR; INTRAVENOUS at 06:19

## 2023-01-01 RX ADMIN — PIPERACILLIN AND TAZOBACTAM 25 GRAM(S): 4; .5 INJECTION, POWDER, LYOPHILIZED, FOR SOLUTION INTRAVENOUS at 21:23

## 2023-01-01 RX ADMIN — PIPERACILLIN AND TAZOBACTAM 25 GRAM(S): 4; .5 INJECTION, POWDER, LYOPHILIZED, FOR SOLUTION INTRAVENOUS at 22:27

## 2023-01-01 RX ADMIN — MIDODRINE HYDROCHLORIDE 5 MILLIGRAM(S): 2.5 TABLET ORAL at 14:20

## 2023-01-01 RX ADMIN — Medication 3.03 MICROGRAM(S)/KG/MIN: at 18:53

## 2023-01-01 RX ADMIN — PANTOPRAZOLE SODIUM 40 MILLIGRAM(S): 20 TABLET, DELAYED RELEASE ORAL at 11:31

## 2023-01-01 RX ADMIN — LACTULOSE 30 GRAM(S): 10 SOLUTION ORAL at 21:03

## 2023-01-01 RX ADMIN — Medication 50 MILLILITER(S): at 17:43

## 2023-01-01 RX ADMIN — MIDODRINE HYDROCHLORIDE 5 MILLIGRAM(S): 2.5 TABLET ORAL at 06:29

## 2023-01-01 RX ADMIN — PANTOPRAZOLE SODIUM 40 MILLIGRAM(S): 20 TABLET, DELAYED RELEASE ORAL at 12:17

## 2023-01-01 RX ADMIN — HYDROMORPHONE HYDROCHLORIDE 0.5 MILLIGRAM(S): 2 INJECTION INTRAMUSCULAR; INTRAVENOUS; SUBCUTANEOUS at 09:35

## 2023-01-01 RX ADMIN — Medication 1000 MILLIGRAM(S): at 21:50

## 2023-01-01 RX ADMIN — CHLORHEXIDINE GLUCONATE 15 MILLILITER(S): 213 SOLUTION TOPICAL at 05:25

## 2023-01-01 RX ADMIN — PANTOPRAZOLE SODIUM 40 MILLIGRAM(S): 20 TABLET, DELAYED RELEASE ORAL at 11:43

## 2023-01-01 RX ADMIN — SODIUM CHLORIDE 60 MILLILITER(S): 9 INJECTION, SOLUTION INTRAVENOUS at 05:13

## 2023-01-01 RX ADMIN — PANTOPRAZOLE SODIUM 10 MG/HR: 20 TABLET, DELAYED RELEASE ORAL at 22:07

## 2023-01-01 RX ADMIN — PIPERACILLIN AND TAZOBACTAM 25 GRAM(S): 4; .5 INJECTION, POWDER, LYOPHILIZED, FOR SOLUTION INTRAVENOUS at 08:54

## 2023-01-01 RX ADMIN — Medication 50 MILLILITER(S): at 18:41

## 2023-01-01 RX ADMIN — Medication 1000 MILLIGRAM(S): at 22:20

## 2023-01-01 RX ADMIN — Medication 25 MICROGRAM(S): at 05:20

## 2023-01-01 RX ADMIN — CHLORHEXIDINE GLUCONATE 15 MILLILITER(S): 213 SOLUTION TOPICAL at 06:07

## 2023-01-01 RX ADMIN — Medication 1000 MILLIGRAM(S): at 10:30

## 2023-01-01 RX ADMIN — MIDODRINE HYDROCHLORIDE 5 MILLIGRAM(S): 2.5 TABLET ORAL at 22:14

## 2023-01-01 RX ADMIN — Medication 200 MILLILITER(S): at 11:55

## 2023-01-01 RX ADMIN — Medication 650 MILLIGRAM(S): at 21:04

## 2023-01-01 RX ADMIN — PIPERACILLIN AND TAZOBACTAM 25 GRAM(S): 4; .5 INJECTION, POWDER, LYOPHILIZED, FOR SOLUTION INTRAVENOUS at 13:35

## 2023-01-01 RX ADMIN — LACTULOSE 30 GRAM(S): 10 SOLUTION ORAL at 14:25

## 2023-01-01 RX ADMIN — MUPIROCIN 1 APPLICATION(S): 20 OINTMENT TOPICAL at 05:13

## 2023-01-01 RX ADMIN — PROPOFOL 3.92 MICROGRAM(S)/KG/MIN: 10 INJECTION, EMULSION INTRAVENOUS at 06:15

## 2023-01-01 RX ADMIN — OCTREOTIDE ACETATE 10 MICROGRAM(S)/HR: 200 INJECTION, SOLUTION INTRAVENOUS; SUBCUTANEOUS at 20:45

## 2023-01-01 RX ADMIN — Medication 50 MILLIGRAM(S): at 15:28

## 2023-01-01 RX ADMIN — CHLORHEXIDINE GLUCONATE 15 MILLILITER(S): 213 SOLUTION TOPICAL at 17:47

## 2023-01-01 RX ADMIN — LACTULOSE 200 GRAM(S): 10 SOLUTION ORAL at 12:40

## 2023-01-01 RX ADMIN — Medication 3.03 MICROGRAM(S)/KG/MIN: at 22:18

## 2023-01-01 RX ADMIN — PANTOPRAZOLE SODIUM 40 MILLIGRAM(S): 20 TABLET, DELAYED RELEASE ORAL at 05:20

## 2023-01-11 NOTE — ED ADULT NURSE NOTE - NS ED NOTE ABUSE RESPONSE YN
50-yr-old woman admitted with symptomatic anemia (Hg 3.5) seen in consult for anemia. The patient has stage IV CKD and other medical conditions. She has already received several units of transfusion. The patient is already on SYED. She will need a higher dose of SYED and IV iron. Please request for an ECHO to r/o severe aortic stenosis. Supportive. 50-yr-old woman admitted with symptomatic anemia (Hg 3.5) seen in consult for anemia. 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 She has already received several units of transfusion. The patient is already on SYED. She will need a higher dose of SYED and IV iron. Please request for an ECHO to r/o severe aortic stenosis. Supportive. Yes

## 2023-02-06 NOTE — ED ADULT NURSE NOTE - CHIEF COMPLAINT QUOTE
Spoke to patient's mother Mali Blair and Patient. Patient states he was reading the side effects on the cymbalta and has concerns. Patient states he is happy except when his mother starts a fight with him. Advised that the medication was ordered to help with his anxiety and the neuropathy. Patient states he just wants his ocrevus and he will be better. Advised this writer will let the provider know. Patient inquired how the ocrevus infusion will work. He states he has been reading up on this medication and is ready to be able to go back to normal. Re-educated patient on his diagnosis and what happens. Advised that this medication will slow the progression but is not a cure. Patient reports he knows but wants to do things on his own. Patient asked about the treatment scheduled on Wednesday. Advised that this writer does not know about the facility but patient has been there before and should know. He states he was given the steroids in a big room with other. He is afraid someone will take his things. Advised to keep his belongings with him. Patient also asked about the hospital he can be admitted to. Advised that the hospital is not for a transfer and he can not go from there to the cancer center and back. Patient states he thinks it was an assisted living facility that he was offered to go to after the last hospital stay and will look into that to become more independent. Patient has appointment on Wednesday for the first infusion. c/o neck pain, body aches, c/p abd pain  and nausea, pt with hx of liver cirrhosis, esophageal bleeding, denies  bleeding with emesis, endorses fatigue, repros had Moderna vaccine on 1/27/20.

## 2023-02-07 NOTE — H&P ADULT - ATTENDING COMMENTS
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    ERCP on 4/22 and was found to have extensive choledocholithiasis and biliary purulence consistent with cholangitis and occluded biliary stent. Stent removed and replaced with new 10Fr/7cm plastic biliary stent. Patient considered poor surgical candidate for cholecystectomy.      T(C): 36.3 (02-07-23 @ 23:15), Max: 36.8 (02-07-23 @ 19:05)  HR: 81 (02-07-23 @ 23:15) (81 - 96)  BP: 90/58 (02-07-23 @ 23:15) (75/52 - 93/60)  RR: 18 (02-07-23 @ 23:15) (18 - 18)  SpO2: 100% (02-07-23 @ 23:15) (99% - 100%)      GIB given h/o esophageal varices  - PPI IV drip  - Ocreotride drip  - NPO after midnight   GI consult appreciated     Hyperkalemia Hold Spironolactone  KAY on CKD - fluids   Monitor renal function   Liver Cirhhosis CT abdomen stable

## 2023-02-07 NOTE — CONSULT NOTE ADULT - SUBJECTIVE AND OBJECTIVE BOX
GI INITIAL CONSULT    HPI:     PMH:   PSH:     Meds: MEDICATIONS  (STANDING):  sodium zirconium cyclosilicate 10 Gram(s) Oral once    SH: not contributory  FH: not contributory  ROS:  CONSTITUTIONAL: No fever, weight loss, or fatigue  EYES: No eye pain, visual disturbances, or discharge  ENT:  No difficulty hearing, tinnitus, vertigo; No sinus or throat pain  NECK: No pain or stiffness  RESPIRATORY: No cough, wheezing, chills or hemoptysis, shortness of Breath  CARDIOVASCULAR: No chest pain, palpitations, passing out, dizziness, or leg swelling  GASTROINTESTINAL: see HPI  GENITOURINARY: No dysuria, frequency, hematuria, or incontinence  NEUROLOGICAL: No headaches, memory loss, loss of strength, numbness, or tremors  SKIN: No itching, burning, rashes, or lesions   MUSCULOSKELETAL: No arthralgia, myalgia, or back pain.     Vitals: Vital Signs Last 24 Hrs  T(C): 36.7 (07 Feb 2023 15:05), Max: 36.7 (07 Feb 2023 15:05)  T(F): 98.1 (07 Feb 2023 15:05), Max: 98.1 (07 Feb 2023 15:05)  HR: 92 (07 Feb 2023 15:05) (81 - 92)  BP: 83/49 (07 Feb 2023 15:05) (83/49 - 93/60)  RR: 18 (07 Feb 2023 15:05) (18 - 18)  SpO2: 100% (07 Feb 2023 15:05) (96% - 100%)                          8.6    8.36  )-----------( 169      ( 07 Feb 2023 11:45 )             28.9   02-07    x   |  x   |  x   ----------------------------<  x   6.6<HH>   |  x   |  x     Ca    8.5      07 Feb 2023 11:45      Gen: NAD  CVS: S1/S2  Chest: CTABL  Abd: S/ND/NT    Imaging: no abd imaging available GI INITIAL CONSULT    HPI: 82M w/WALL cirrhosis and EV w/h/o bleeding in the past p/w anemia and melena from the nursing home. Not hypotensive at this time.    PMH: Porcelain GB, esophageal varices, CKD, WALL cirrhosis  PSH: inguinal hernia repair    Meds: MEDICATIONS  (STANDING):  sodium zirconium cyclosilicate 10 Gram(s) Oral once    SH: not contributory  FH: not contributory  ROS:  CONSTITUTIONAL: No fever, weight loss, or fatigue  EYES: No eye pain, visual disturbances, or discharge  ENT:  No difficulty hearing, tinnitus, vertigo; No sinus or throat pain  NECK: No pain or stiffness  RESPIRATORY: No cough, wheezing, chills or hemoptysis, shortness of Breath  CARDIOVASCULAR: No chest pain, palpitations, passing out, dizziness, or leg swelling  GASTROINTESTINAL: see HPI  GENITOURINARY: No dysuria, frequency, hematuria, or incontinence  NEUROLOGICAL: No headaches, memory loss, loss of strength, numbness, or tremors  SKIN: No itching, burning, rashes, or lesions   MUSCULOSKELETAL: No arthralgia, myalgia, or back pain.     Vitals: Vital Signs Last 24 Hrs  T(C): 36.7 (07 Feb 2023 15:05), Max: 36.7 (07 Feb 2023 15:05)  T(F): 98.1 (07 Feb 2023 15:05), Max: 98.1 (07 Feb 2023 15:05)  HR: 92 (07 Feb 2023 15:05) (81 - 92)  BP: 83/49 (07 Feb 2023 15:05) (83/49 - 93/60)  RR: 18 (07 Feb 2023 15:05) (18 - 18)  SpO2: 100% (07 Feb 2023 15:05) (96% - 100%)                          8.6    8.36  )-----------( 169      ( 07 Feb 2023 11:45 )             28.9   02-07    x   |  x   |  x   ----------------------------<  x   6.6<HH>   |  x   |  x     Ca    8.5      07 Feb 2023 11:45      Gen: NAD  CVS: S1/S2  Chest: CTABL  Abd: S/ND/NT    Imaging: no abd imaging available

## 2023-02-07 NOTE — ED PROVIDER NOTE - OBJECTIVE STATEMENT
82-year-old male with history of GI bleed last April presents with GI bleed.  Patient's wife states she had diarrhea that was black and tarry today.  He also had black stools yesterday.  Patient had a GI bleed in April of last year and has been in rehab until January 31 1 week ago.  He was discharged on aspirin he denies lightheadedness

## 2023-02-07 NOTE — ED PROVIDER NOTE - PHYSICAL EXAMINATION
General: Well appearing, no acute distress, appears stated age  HEENT: normocephalic, atraumatic   Respiratory: normal work of breathing  MSK: no swelling or tenderness of lower extremities, moving all extremities spontaneously   Skin: warm, dry  Neuro: A&Ox3, cranial nerves II-XII intact, 5/5 strength in all extremities, no sensory deficits, normal gait   Psych: appropriate affect  ABD: soft, diffusely tender, nondistended, no guarding, no rebound, no CVA tenderness

## 2023-02-07 NOTE — CHART NOTE - NSCHARTNOTEFT_GEN_A_CORE
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 Patient reported that he has been chronic abdominal pain but since yesterday 2/6 and constipation with dark colored bowel movements. Patient admitted for further management, concern for GIB given h/o esophageal varices, GI consulted Dr. Lemus. CT angio abd/ pelvis shows Cirrhosis. Common duct stent with markedly dilated common duct containing calculi and debris similar in appearance to 04/20/2022.     Repeated CBC with hgb 6.4 and systolic B/P 80s. Patient alert consent for blood transfusion obtained.     In ED VS: T 97.7 HR 85 T 97.6 RR 18 Spo2 96%RA  K 6.5  Cr 1.53  Hb 8.6  CT angio abd/ pelvis shows Cirrhosis .Common duct stent with markedly dilated common duct containing calculi and debris similar in appearance to 04/20/2022.  s/p 2g Ca 10u Insulin in E D    OBJECTIVE:  Vital Signs Last 24 Hrs  T(C): 36.8 (07 Feb 2023 19:05), Max: 36.8 (07 Feb 2023 19:05)  T(F): 98.3 (07 Feb 2023 19:05), Max: 98.3 (07 Feb 2023 19:05)  HR: 96 (07 Feb 2023 19:05) (81 - 96)  BP: 75/52 (07 Feb 2023 19:05) (75/52 - 93/60)  BP(mean): --  RR: 18 (07 Feb 2023 19:05) (18 - 18)  SpO2: 100% (07 Feb 2023 19:05) (96% - 100%)    Parameters below as of 07 Feb 2023 19:05  Patient On (Oxygen Delivery Method): room air        LABS:                        6.4    8.92  )-----------( x        ( 07 Feb 2023 20:27 )             20.3     02-07    140  |  114<H>  |  72<H>  ----------------------------<  108<H>  5.8<H>   |  16<L>  |  1.52<H>    Ca    8.3<L>      07 Feb 2023 20:27    TPro  4.8<L>  /  Alb  2.0<L>  /  TBili  0.4  /  DBili  0.1  /  AST  22  /  ALT  17  /  AlkPhos  76  02-07    FOCUS PHYSICAL EXAM:    PROBLEM: Symptomatic Acute GIB (hgb 6.4 from 8.6) and Hypotension   PLAN:   - npo  -PPI IV drip   - Ocreotride drip  - PRBC x 2 units ordered ( blood transfusion consent obtain)  - Monitor Hemoglobin (keep >7)  - Plan  for EGD 2/8/23 to evaluate source of bleeding (with therapy)  - Monitor vital signs and hemodynamics  -  ICU consult for further monitoring

## 2023-02-07 NOTE — H&P ADULT - NSICDXPASTMEDICALHX_GEN_ALL_CORE_FT
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

## 2023-02-07 NOTE — CONSULT NOTE ADULT - ATTENDING COMMENTS
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 Patient states that he has been chronic abdominal pain but since yesterday he has been having constipation with dark colored bowel movements. patient has not been able to eat over the last few months. His diet has consisted of mostly ensure. Patient underwent ERCP on 4/22 and was found to have extensive choledocholithiasis and biliary purulence consistent with cholangitis and occluded biliary stent. Stent removed and replaced with new 10Fr/7cm plastic biliary stent. Cholangioscopy was not performed given active cholangitis. Repeated Hb dropped to 6.5. BP significantly dropped. ICU was consulted for hypotension and symptomatic anemia with suspected GI bleed    Assessment and Plan  #Symptomatic anemia 2.2 GI bleed  # Hypotension   #Liver cirrhosis   #Porcelain gallbladder      2 large bore IVs    Active Type and screen   NPO   PPI and Octreotide infusions   SCD for DVT prophylaxis   GI evaluation   Volume resuscitation    Supportive blood products transfusion, keep Hb >7    Strict IO   Monitor and replete electrolytes   Ceftriaxone for SBP prophylaxis

## 2023-02-07 NOTE — CONSULT NOTE ADULT - ASSESSMENT
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 Patient states that he has been chronic abdominal pain but since yesterday he has been having constipation with dark colored bowel movements. patient has not been able to eat over the last few months. His diet has consisted of mostly ensure. Patient underwent ERCP on 4/22 and was found to have extensive choledocholithiasis and biliary purulence consistent with cholangitis and occluded biliary stent. Stent removed and replaced with new 10Fr/7cm plastic biliary stent. Cholangioscopy was not performed given active cholangitis. Repeated Hb dropped to 6.5. BP significantly dropped. 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 Patient states that he has been chronic abdominal pain but since yesterday he has been having constipation with dark colored bowel movements. patient has not been able to eat over the last few months. His diet has consisted of mostly ensure. Patient underwent ERCP on 4/22 and was found to have extensive choledocholithiasis and biliary purulence consistent with cholangitis and occluded biliary stent. Stent removed and replaced with new 10Fr/7cm plastic biliary stent. Cholangioscopy was not performed given active cholangitis. Repeated Hb dropped to 6.5. BP significantly dropped. ICU was consulted for severe hypotension and symptomatic anemia with suspected GI bleed      #Symptomatic anemia   #Severe Hypotension   #Liver cirrhosis   #Porcelain gallbladder     _________CNS___________    AAOx2-3  Not encephalopathic   Will start bowel regimen and lactulose       _________CVS___________  #Severe hypotension   Likely due to anemia   Started on IV fluids and Blood transfusion   Will monitor s/p transfusion     _________ RESP__________    #No issues     __________GI____________    #Symptomatic anemia   Hb dropped from 8.4 to 6.4  Will start blood transfusion   Type and screen   IV fluids for Hypotension 1L IV bolus NS0.9%  GI was consulted   EGD AM   Octreotide drip   PPI BID   Monitor CBC s/p transfusion and then Q6    ________ RENAL__________  #KAY  Baseline normal Creatinine   Likely due to hypotension and Anemia   Will repeat BMP after IV fluids and Blood resuscitation  Send U lytes     #Hyperkalemia   Now trended down s/p Cocktail   Will repeat BMP     _________MSK___________    no active issues    __________ID____________    #No issues     _________ENDO__________    #NPO    _______HEME/ONC_______    #As above     _________SKIN____________    - no issues    ________Prophylaxis_______  # PPI BID   # Hold AC for bleeding     __________GOC/ DISPO___________    FULL CODE 82 year old male w/ PMH alcoholic liver cirrhosis, GI bleed, 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 Patient states that he has been chronic abdominal pain but since yesterday he has been having constipation with dark colored bowel movements. patient has not been able to eat over the last few months. His diet has consisted of mostly ensure. Patient underwent ERCP on 4/22 and was found to have extensive choledocholithiasis and biliary purulence consistent with cholangitis and occluded biliary stent. Stent removed and replaced with new 10Fr/7cm plastic biliary stent. Cholangioscopy was not performed given active cholangitis. Repeated Hb dropped to 6.5. BP significantly dropped. ICU was consulted for severe hypotension and symptomatic anemia with suspected GI bleed      #Symptomatic anemia   #Severe Hypotension   #Liver cirrhosis   #Porcelain gallbladder     _________CNS___________    AAOx2-3  Not encephalopathic   Will start bowel regimen and lactulose       _________CVS___________  #Severe hypotension   Likely due to anemia   Started on IV fluids and Blood transfusion   Will monitor s/p transfusion     _________ RESP__________    #No issues     __________GI____________    #Symptomatic anemia   Hb dropped from 8.4 to 6.4  Will start blood transfusion   Hold Plavix   Type and screen   IV fluids for Hypotension 1L IV bolus NS0.9%  GI was consulted   EGD AM   Octreotide drip   PPI BID   Monitor CBC s/p transfusion and then Q6    ________ RENAL__________  #KAY  Baseline normal Creatinine   Likely due to hypotension and Anemia   Will repeat BMP after IV fluids and Blood resuscitation  Send U lytes     #Hyperkalemia   Hold spironolactone   Now trended down s/p Cocktail   Will repeat BMP     _________MSK___________    no active issues    __________ID____________    #No issues     _________ENDO__________    #NPO    _______HEME/ONC_______    #As above     _________SKIN____________    - no issues    ________Prophylaxis_______  # PPI BID   # Hold AC for bleeding     __________GOC/ DISPO___________    FULL CODE 82 year old male w/ PMH alcoholic liver cirrhosis, GI bleed, 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 Patient states that he has been chronic abdominal pain but since yesterday he has been having constipation with dark colored bowel movements. patient has not been able to eat over the last few months. His diet has consisted of mostly ensure. Patient underwent ERCP on 4/22 and was found to have extensive choledocholithiasis and biliary purulence consistent with cholangitis and occluded biliary stent. Stent removed and replaced with new 10Fr/7cm plastic biliary stent. Cholangioscopy was not performed given active cholangitis. Repeated Hb dropped to 6.5. BP significantly dropped. ICU was consulted for severe hypotension and symptomatic anemia with suspected GI bleed      #Symptomatic anemia   #Severe Hypotension   #Liver cirrhosis   #Porcelain gallbladder     _________CNS___________    AAOx2-3  Not encephalopathic   Will start bowel regimen and lactulose       _________CVS___________  #Severe hypotension   Likely due to anemia   Started on IV fluids and Blood transfusion   Will monitor s/p transfusion     _________ RESP__________    #No issues     __________GI____________    #Symptomatic anemia   CT angio abd/ pelvis shows Cirrhosis. Common duct stent with markedly dilated common duct containing calculi and debris similar in appearance to 04/20/2022  Hb dropped from 8.4 to 6.4  Will start blood transfusion   Hold Plavix   Type and screen   IV fluids for Hypotension 1L IV bolus NS0.9%  GI was consulted   EGD AM   Octreotide drip   GI DR. Lemus   PPI BID   Monitor CBC s/p transfusion and then Q6    ________ RENAL__________  #KAY  Baseline normal Creatinine   Likely due to hypotension and Anemia   Will repeat BMP after IV fluids and Blood resuscitation  Send U lytes     #Hyperkalemia   Hold spironolactone   Now trended down s/p Cocktail   Will repeat BMP     _________MSK___________    no active issues    __________ID____________    #No issues     _________ENDO__________    #NPO    _______HEME/ONC_______    #As above     _________SKIN____________    - no issues    ________Prophylaxis_______  # PPI BID   # Hold AC for bleeding     __________GOC/ DISPO___________    FULL CODE 82 year old male w/ PMH alcoholic liver cirrhosis, GI bleed, 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 Patient states that he has been chronic abdominal pain but since yesterday he has been having constipation with dark colored bowel movements. patient has not been able to eat over the last few months. His diet has consisted of mostly ensure. Patient underwent ERCP on 4/22 and was found to have extensive choledocholithiasis and biliary purulence consistent with cholangitis and occluded biliary stent. Stent removed and replaced with new 10Fr/7cm plastic biliary stent. Cholangioscopy was not performed given active cholangitis. Repeated Hb dropped to 6.5. BP significantly dropped. ICU was consulted for severe hypotension and symptomatic anemia with suspected GI bleed      #Symptomatic anemia   #Severe Hypotension   #Liver cirrhosis   #Porcelain gallbladder   #Acute kidney injury   #Hyperkalemia     _________CNS___________    AAOx2-3  Not encephalopathic   Will start bowel regimen and lactulose       _________CVS___________  #Severe hypotension   Likely due to anemia   Started on IV fluids and Blood transfusion   Will monitor s/p transfusion     _________ RESP__________    #No issues     __________GI____________    #Symptomatic anemia   CT angio abd/ pelvis shows Cirrhosis. Common duct stent with markedly dilated common duct containing calculi and debris similar in appearance to 04/20/2022  Hb dropped from 8.4 to 6.4  Will start blood transfusion   Hold Plavix   Type and screen   IV fluids for Hypotension 1L IV bolus NS0.9%  GI was consulted   EGD AM   Octreotide drip   GI DR. Lemus   PPI BID   Monitor CBC s/p transfusion and then Q6    ________ RENAL__________  #KAY  Baseline normal Creatinine   Likely due to hypotension and Anemia   Will repeat BMP after IV fluids and Blood resuscitation  Send U lytes     #Hyperkalemia   Hold spironolactone   Now trended down s/p Cocktail   Will repeat BMP     _________MSK___________    no active issues    __________ID____________    #No issues     _________ENDO__________    #NPO    _______HEME/ONC_______    #As above     _________SKIN____________    - no issues    ________Prophylaxis_______  # PPI BID   # Hold AC for bleeding     __________GOC/ DISPO___________    FULL CODE

## 2023-02-07 NOTE — H&P ADULT - PROBLEM SELECTOR PLAN 3
Hx of Liver cirhosis  on propanolol, lasix, and spironolactone at home. hold due to low BP   CT abdomen stable

## 2023-02-07 NOTE — H&P ADULT - REASON FOR ADMISSION
Contrast bath instructions    Water in the cold container should be between 50-59°F (10-15°C), and water in the hot container should be between °F (35-45°C).    It’s important to use a thermometer to gauge the water temperature so you don’t accidental abdominal pain

## 2023-02-07 NOTE — ED ADULT NURSE NOTE - OBJECTIVE STATEMENT
received pt from nursing from with dark stool axox2 , wife at bed side , pt has a multiple skin tear all over the body .

## 2023-02-07 NOTE — CONSULT NOTE ADULT - ASSESSMENT
82M w/ Melena  - Concern for GIB given h/o esophageal varices  - Hb 8.6  - BUN/Cr. 72/1.53  - PPI IV drip  - Ocreotride drip  - NPO after midnight  - Schedule EGD on 2/8/23 in the AM   82M w/ Melena in setting of WALL cirrhosis.  - Concern for GIB given h/o esophageal varices  - Hb 8.6  - BUN/Cr. 72/1.53  - PPI IV drip  - Ocreotride drip  - NPO after midnight  - Schedule EGD on 2/8/23 in the AM

## 2023-02-07 NOTE — CONSULT NOTE ADULT - SUBJECTIVE AND OBJECTIVE BOX
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 Patient states that he has been chronic abdominal pain but since yesterday he has been having constipation with dark colored bowel movements. patient has not been able to eat over the last few months. His diet has consisted of mostly ensure. Patient underwent ERCP on 4/22 and was found to have extensive choledocholithiasis and biliary purulence consistent with cholangitis and occluded biliary stent. Stent removed and replaced with new 10Fr/7cm plastic biliary stent. Cholangioscopy was not performed given active cholangitis. Patient considered poor surgical candidate for cholecystectomy. He was d/c with repeat ERCP in 2-3 months for stent removal and possible cholangioscopy. Patient denies any vomiting or any other symptoms including fevers, chills, headaches dizziness, chest pain, SOB, cough, hematuria, dysuria.     In ED, Repeated Hb is 6.5, BP dropped to 75/58. Blood transfusion started and IV fluids as well. ICU was consulted for symptomatic anemia and suspected GI bleed.    REVIEW OF SYSTEMS:  CONSTITUTIONAL: Weakness   EYES/ENT: No visual changes;  No vertigo or throat pain   RESPIRATORY: No cough, wheezing, hemoptysis; No shortness of breath  CARDIOVASCULAR: No chest pain or palpitations  GASTROINTESTINAL: No abdominal  pain. No nausea, vomiting. No diarrhea or constipation. No melena or hematochezia.  GENITOURINARY: No dysuria, frequency or hematuria  NEUROLOGICAL: No numbness or weakness  SKIN: No itching, rashes    ICU Vital Signs Last 24 Hrs  T(C): 36.3 (07 Feb 2023 23:15), Max: 36.8 (07 Feb 2023 19:05)  T(F): 97.3 (07 Feb 2023 23:15), Max: 98.3 (07 Feb 2023 19:05)  HR: 81 (07 Feb 2023 23:15) (81 - 96)  BP: 90/58 (07 Feb 2023 23:15) (75/52 - 93/60)  RR: 18 (07 Feb 2023 23:15) (18 - 18)  SpO2: 100% (07 Feb 2023 23:15) (96% - 100%)    O2 Parameters below as of 07 Feb 2023 23:15  Patient On (Oxygen Delivery Method): room air      Physical Exam     GENERAL: NAD, lying in bed, weak   HEAD:  Atraumatic, Normocephalic  EYES: EOMI, PERRLA, conjunctiva and sclera Pale   ENT: Moist mucous membranes  NECK: Supple, No JVD  CHEST/LUNG: Clear to auscultation bilaterally; No rales, rhonchi, wheezing, or rubs. Unlabored respirations  HEART: Regular rate and rhythm; No murmurs, rubs, or gallops  ABDOMEN: Bowel sounds present; Soft, Nontender but distended.  EXTREMITIES:  2+ Peripheral Pulses, brisk capillary refill. No clubbing, cyanosis, or edema  NERVOUS SYSTEM:  Alert & Oriented X2-3, speech clear.  MSK: FROM all 4 extremities, full and equal strength  SKIN: No rashes or lesions

## 2023-02-07 NOTE — ED PROVIDER NOTE - CLINICAL SUMMARY MEDICAL DECISION MAKING FREE TEXT BOX
Elderly male on aspirin and Plavix for GI bleed.  We will give Protonix IV fluids check H&H if extremely low we will give for transfusion.  We will consult GI Dr. And admit to medicine.

## 2023-02-07 NOTE — ED PROVIDER NOTE - CARE PLAN
Principal Discharge DX:	GIB (gastrointestinal bleeding)   1 Principal Discharge DX:	GIB (gastrointestinal bleeding)  Secondary Diagnosis:	Hyperkalemia

## 2023-02-07 NOTE — H&P ADULT - PROBLEM SELECTOR PLAN 1
CT angio abd/ pelvis shows Cirrhosis. Common duct stent with markedly dilated common duct containing calculi and debris similar in appearance to 04/20/2022.  GI consulted Dr. Lemus    Concern for GIB given h/o esophageal varices  - PPI IV drip  - Ocreotride drip  - NPO after midnight  - Schedule EGD on 2/8/23 in the AM  zofran prn for n/v  nutrition consult  f/u CBC stat  tylenol PRN

## 2023-02-07 NOTE — H&P ADULT - PROBLEM SELECTOR PLAN 2
Pt taking spironolactone at home  hold   s/p 2g calcium gluconate  and insulin  15g kayexelate   f/u repeat BMP STAT  IVF

## 2023-02-07 NOTE — H&P ADULT - HISTORY OF PRESENT ILLNESS
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     Patient underwent ERCP on 4/22: Extensive choledocholithiasis and biliary purulence consistent with cholangitis and occluded biliary stent. Stent removed and replaced with new 10Fr/7cm plastic biliary stent. Cholangioscopy not performed given active cholangitis. Patient considered poor surgical candidate for cholecystectomy. Will need repeat ERCP in 2-3 months for stent removal and possible cholangioscopy. 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 Patient states that he has been chronic abdominal pain but since yesterday he has been having constipation with dark colored bowel movements. patient has not been able to eat over the last few months. His diet has consisted of mostly ensure. Patient underwent ERCP on 4/22 and was found to have extensive choledocholithiasis and biliary purulence consistent with cholangitis and occluded biliary stent. Stent removed and replaced with new 10Fr/7cm plastic biliary stent. Cholangioscopy was not performed given active cholangitis. Patient considered poor surgical candidate for cholecystectomy. He was d/c with repeat ERCP in 2-3 months for stent removal and possible cholangioscopy. Patient denies any vomiting or any other symptoms including fevers, chills, headaches dizziness, chest pain, SOB, cough, hematuria, dysuria.     In ED VS: T 97.7 HR 85 T 97.6 RR 18 Spo2 96%RA  K 6.5  Cr 1.53  Hb 8.6  CT angio abd/ pelvis shows Cirrhosis.Common duct stent with markedly dilated common duct containing calculi and debris similar in appearance to 04/20/2022.  s/p 2g Ca 10u Insulin in E D

## 2023-02-07 NOTE — H&P ADULT - ASSESSMENT
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 Patient states that he has been chronic abdominal pain but since yesterday he has been having constipation with dark colored bowel movements admitted for further management.       In ED VS: T 97.7 HR 85 T 97.6 RR 18 Spo2 96%RA

## 2023-02-08 NOTE — PROGRESS NOTE ADULT - SUBJECTIVE AND OBJECTIVE BOX
INTERVAL HPI/OVERNIGHT EVENTS:       PRESSORS: [ ] YES [ ] NO  WHICH:    ANTIBIOTICS:                  DATE STARTED:  ANTIBIOTICS:                  DATE STARTED:    Antimicrobial:  cefTRIAXone   IVPB 2000 milliGRAM(s) IV Intermittent every 24 hours    Cardiovascular:    Pulmonary:    Hematalogic:    Other:  acetaminophen     Tablet .. 650 milliGRAM(s) Oral every 6 hours PRN  famotidine    Tablet 20 milliGRAM(s) Oral daily  ferrous    sulfate 325 milliGRAM(s) Oral at bedtime  lactulose Syrup 10 Gram(s) Oral daily  levothyroxine 25 MICROGram(s) Oral daily  melatonin 1 milliGRAM(s) Oral at bedtime  octreotide  Infusion 50 MICROgram(s)/Hr IV Continuous <Continuous>  ondansetron Injectable 4 milliGRAM(s) IV Push once PRN  pantoprazole Infusion 8 mG/Hr IV Continuous <Continuous>  senna 2 Tablet(s) Oral at bedtime    acetaminophen     Tablet .. 650 milliGRAM(s) Oral every 6 hours PRN  cefTRIAXone   IVPB 2000 milliGRAM(s) IV Intermittent every 24 hours  famotidine    Tablet 20 milliGRAM(s) Oral daily  ferrous    sulfate 325 milliGRAM(s) Oral at bedtime  lactulose Syrup 10 Gram(s) Oral daily  levothyroxine 25 MICROGram(s) Oral daily  melatonin 1 milliGRAM(s) Oral at bedtime  octreotide  Infusion 50 MICROgram(s)/Hr IV Continuous <Continuous>  ondansetron Injectable 4 milliGRAM(s) IV Push once PRN  pantoprazole Infusion 8 mG/Hr IV Continuous <Continuous>  senna 2 Tablet(s) Oral at bedtime    Drug Dosing Weight  Height (cm): 177.8 (07 Feb 2023 11:14)  Weight (kg): 65.8 (07 Feb 2023 11:14)  BMI (kg/m2): 20.8 (07 Feb 2023 11:14)  BSA (m2): 1.82 (07 Feb 2023 11:14)    PHYSICAL EXAM:  GENERAL: NAD  EYES: EOMI, PERRLA  NECK: Supple, No JVD; Trachea midline: No LAD   NERVOUS SYSTEM:  Alert & Oriented X3,  Motor Strength 5/5 B/L upper and lower extremities  CHEST/LUNG:  breath sounds present bilaterally, No rales, rhonchi, wheezing  HEART: Regular rate and rhythm; No murmurs, rubs, or gallops  ABDOMEN: Soft, Nontender, Nondistended; Bowel sounds present, no pain or masses on palpation  : voiding well, Reynolds in place  EXTREMITIES:  2+ Peripheral Pulses, No clubbing, cyanosis, or edema  SKIN: warm, intact, no lesions     LINES/DRAINS/DEVICES  CENTRAL LINE: [ ] YES [ ] NO  LOCATION:     REYNOLDS: [ ] YES [ ] NO     A-LINE:  [ ] YES [ ] NO  LOCATION:       ICU Vital Signs Last 24 Hrs  T(C): 35.4 (08 Feb 2023 05:20), Max: 36.8 (07 Feb 2023 19:05)  T(F): 95.7 (08 Feb 2023 05:20), Max: 98.3 (07 Feb 2023 19:05)  HR: 72 (08 Feb 2023 07:00) (63 - 97)  BP: 114/79 (08 Feb 2023 07:00) (75/52 - 178/162)  BP(mean): 91 (08 Feb 2023 07:00) (57 - 167)  ABP: --  ABP(mean): --  RR: 15 (08 Feb 2023 07:00) (10 - 28)  SpO2: 100% (08 Feb 2023 07:00) (84% - 100%)    O2 Parameters below as of 08 Feb 2023 06:10  Patient On (Oxygen Delivery Method): room air                          LABS:  CBC Full  -  ( 07 Feb 2023 20:27 )  WBC Count : 8.92 K/uL  RBC Count : 2.30 M/uL  Hemoglobin : 6.4 g/dL  Hematocrit : 20.3 %  Platelet Count - Automated : 137 K/uL  Mean Cell Volume : 88.3 fl  Mean Cell Hemoglobin : 27.8 pg  Mean Cell Hemoglobin Concentration : 31.5 gm/dL  Auto Neutrophil # : x  Auto Lymphocyte # : x  Auto Monocyte # : x  Auto Eosinophil # : x  Auto Basophil # : x  Auto Neutrophil % : x  Auto Lymphocyte % : x  Auto Monocyte % : x  Auto Eosinophil % : x  Auto Basophil % : x    02-07    140  |  114<H>  |  72<H>  ----------------------------<  108<H>  5.8<H>   |  16<L>  |  1.52<H>    Ca    8.3<L>      07 Feb 2023 20:27    TPro  4.8<L>  /  Alb  2.0<L>  /  TBili  0.4  /  DBili  0.1  /  AST  22  /  ALT  17  /  AlkPhos  76  02-07    PT/INR - ( 07 Feb 2023 13:08 )   PT: 21.3 sec;   INR: 1.78 ratio         PTT - ( 07 Feb 2023 13:08 )  PTT:29.2 sec        RADIOLOGY & ADDITIONAL STUDIES REVIEWED DURING TEAM ROUNDS    [ ]GOALS OF CARE DISCUSSION WITH PATIENT/FAMILY/PROXY:    CRITICAL CARE TIME SPENT: 35 minutes   INTERVAL HPI/OVERNIGHT EVENTS:       PRESSORS: [ ] YES [ x] NO  WHICH:    ANTIBIOTICS:                  DATE STARTED:  ANTIBIOTICS:                  DATE STARTED:    Antimicrobial:  cefTRIAXone   IVPB 2000 milliGRAM(s) IV Intermittent every 24 hours    Cardiovascular:    Pulmonary:    Hematalogic:    Other:  acetaminophen     Tablet .. 650 milliGRAM(s) Oral every 6 hours PRN  famotidine    Tablet 20 milliGRAM(s) Oral daily  ferrous    sulfate 325 milliGRAM(s) Oral at bedtime  lactulose Syrup 10 Gram(s) Oral daily  levothyroxine 25 MICROGram(s) Oral daily  melatonin 1 milliGRAM(s) Oral at bedtime  octreotide  Infusion 50 MICROgram(s)/Hr IV Continuous <Continuous>  ondansetron Injectable 4 milliGRAM(s) IV Push once PRN  pantoprazole Infusion 8 mG/Hr IV Continuous <Continuous>  senna 2 Tablet(s) Oral at bedtime    acetaminophen     Tablet .. 650 milliGRAM(s) Oral every 6 hours PRN  cefTRIAXone   IVPB 2000 milliGRAM(s) IV Intermittent every 24 hours  famotidine    Tablet 20 milliGRAM(s) Oral daily  ferrous    sulfate 325 milliGRAM(s) Oral at bedtime  lactulose Syrup 10 Gram(s) Oral daily  levothyroxine 25 MICROGram(s) Oral daily  melatonin 1 milliGRAM(s) Oral at bedtime  octreotide  Infusion 50 MICROgram(s)/Hr IV Continuous <Continuous>  ondansetron Injectable 4 milliGRAM(s) IV Push once PRN  pantoprazole Infusion 8 mG/Hr IV Continuous <Continuous>  senna 2 Tablet(s) Oral at bedtime    Drug Dosing Weight  Height (cm): 177.8 (07 Feb 2023 11:14)  Weight (kg): 65.8 (07 Feb 2023 11:14)  BMI (kg/m2): 20.8 (07 Feb 2023 11:14)  BSA (m2): 1.82 (07 Feb 2023 11:14)    PHYSICAL EXAM:  GENERAL: NAD  EYES: EOMI, PERRLA  NECK: Supple, No JVD; Trachea midline: No LAD   NERVOUS SYSTEM:  Alert & Oriented X3,  Motor Strength 5/5 B/L upper and lower extremities  CHEST/LUNG:  breath sounds present bilaterally, No rales, rhonchi, wheezing  HEART: Regular rate and rhythm; No murmurs, rubs, or gallops  ABDOMEN: Soft, mildly tender, Nondistended; Bowel sounds present, no pain or masses on palpation  : voiding well  EXTREMITIES:  2+ Peripheral Pulses, No clubbing, cyanosis, or edema  SKIN: warm, multiple scattered areas of ecchymosis, Stage II left glute, Stage I bilateral heels      LINES/DRAINS/DEVICES  CENTRAL LINE: [ ] YES [x ] NO  LOCATION:     REYNOLSD: [ ] YES [ ] NO     A-LINE:  [ ] YES [ ] NO  LOCATION:       ICU Vital Signs Last 24 Hrs  T(C): 35.4 (08 Feb 2023 05:20), Max: 36.8 (07 Feb 2023 19:05)  T(F): 95.7 (08 Feb 2023 05:20), Max: 98.3 (07 Feb 2023 19:05)  HR: 72 (08 Feb 2023 07:00) (63 - 97)  BP: 114/79 (08 Feb 2023 07:00) (75/52 - 178/162)  BP(mean): 91 (08 Feb 2023 07:00) (57 - 167)  ABP: --  ABP(mean): --  RR: 15 (08 Feb 2023 07:00) (10 - 28)  SpO2: 100% (08 Feb 2023 07:00) (84% - 100%)    O2 Parameters below as of 08 Feb 2023 06:10  Patient On (Oxygen Delivery Method): room air                          LABS:  CBC Full  -  ( 07 Feb 2023 20:27 )  WBC Count : 8.92 K/uL  RBC Count : 2.30 M/uL  Hemoglobin : 6.4 g/dL  Hematocrit : 20.3 %  Platelet Count - Automated : 137 K/uL  Mean Cell Volume : 88.3 fl  Mean Cell Hemoglobin : 27.8 pg  Mean Cell Hemoglobin Concentration : 31.5 gm/dL  Auto Neutrophil # : x  Auto Lymphocyte # : x  Auto Monocyte # : x  Auto Eosinophil # : x  Auto Basophil # : x  Auto Neutrophil % : x  Auto Lymphocyte % : x  Auto Monocyte % : x  Auto Eosinophil % : x  Auto Basophil % : x    02-07    140  |  114<H>  |  72<H>  ----------------------------<  108<H>  5.8<H>   |  16<L>  |  1.52<H>    Ca    8.3<L>      07 Feb 2023 20:27    TPro  4.8<L>  /  Alb  2.0<L>  /  TBili  0.4  /  DBili  0.1  /  AST  22  /  ALT  17  /  AlkPhos  76  02-07    PT/INR - ( 07 Feb 2023 13:08 )   PT: 21.3 sec;   INR: 1.78 ratio         PTT - ( 07 Feb 2023 13:08 )  PTT:29.2 sec        RADIOLOGY & ADDITIONAL STUDIES REVIEWED DURING TEAM ROUNDS    [ ]GOALS OF CARE DISCUSSION WITH PATIENT/FAMILY/PROXY:    CRITICAL CARE TIME SPENT: 35 minutes

## 2023-02-08 NOTE — DIETITIAN INITIAL EVALUATION ADULT - REASON INDICATOR FOR ASSESSMENT
NP (Assess/ [facility]), RN: pressure injury. Pt admit to ICU this day. NPO

## 2023-02-08 NOTE — DIETITIAN INITIAL EVALUATION ADULT - PERTINENT LABORATORY DATA
02-08    146<H>  |  121<H>  |  64<H>  ----------------------------<  92  4.4   |  17<L>  |  1.23    Ca    7.9<L>      08 Feb 2023 08:25  Phos  2.3     02-08  Mg     1.6     02-08    TPro  4.3<L>  /  Alb  1.9<L>  /  TBili  0.8  /  DBili  x   /  AST  20  /  ALT  15  /  AlkPhos  68  02-08  POCT Blood Glucose.: 101 mg/dL (02-08-23 @ 11:03)  A1C with Estimated Average Glucose Result: 5.2 % (04-21-22 @ 12:56)   Skin Substitute Text: The defect edges were debeveled with a #15 scalpel blade.  Given the location of the defect, shape of the defect and the proximity to free margins a skin substitute graft was deemed most appropriate.  The graft material was trimmed to fit the size of the defect. The graft was then placed in the primary defect and oriented appropriately.

## 2023-02-08 NOTE — DIETITIAN INITIAL EVALUATION ADULT - SIGNS/SYMPTOMS
hx of severe PCM  (4/22/22) w/ same wt + "not being able to eat over last few months/ except Ensure"

## 2023-02-08 NOTE — PATIENT PROFILE ADULT - FALL HARM RISK - HARM RISK INTERVENTIONS

## 2023-02-08 NOTE — DIETITIAN NUTRITION RISK NOTIFICATION - TREATMENT: THE FOLLOWING DIET HAS BEEN RECOMMENDED
SLP following  MBSS showing global weakness in swallowing as well as aspiration with thin liquids.   ENT consulted but patient did not tolerate laryngoscopy     Plan   - Discussed case with Rheumatology team, they are concerned that this dysphagia may have been from prior stroke, requesting imaging for evaluation-  CT demonstrated no acute findings or causes for dysphagia NOR did MRI   - removed NGT and place dobhoff which is more comfortable and makes swallowing easier compared to NGT.    - continue working with speech therapy    See document section

## 2023-02-08 NOTE — DIETITIAN INITIAL EVALUATION ADULT - PERTINENT MEDS FT
MEDICATIONS  (STANDING):  cefTRIAXone   IVPB 2000 milliGRAM(s) IV Intermittent every 24 hours  chlorhexidine 2% Cloths 1 Application(s) Topical <User Schedule>  levothyroxine Injectable 20 MICROGram(s) IV Push at bedtime  pantoprazole Infusion 8 mG/Hr (10 mL/Hr) IV Continuous <Continuous>    MEDICATIONS  (PRN):  ondansetron Injectable 4 milliGRAM(s) IV Push once PRN Nausea and/or Vomiting

## 2023-02-08 NOTE — PATIENT PROFILE ADULT - FUNCTIONAL ASSESSMENT - BASIC MOBILITY SCORE.
13
Implemented All Universal Safety Interventions:  [place] to call system. Call bell, personal items and telephone within reach. Instruct patient to call for assistance. Room bathroom lighting operational. Non-slip footwear when patient is off stretcher. Physically safe environment: no spills, clutter or unnecessary equipment. Stretcher in lowest position, wheels locked, appropriate side rails in place.

## 2023-02-08 NOTE — DIETITIAN INITIAL EVALUATION ADULT - NSICDXPASTMEDICALHX_GEN_ALL_CORE_FT
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

## 2023-02-08 NOTE — PROGRESS NOTE ADULT - ASSESSMENT
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 Patient states that he has been chronic abdominal pain but since yesterday he has been having constipation with dark colored bowel movements. patient has not been able to eat over the last few months. His diet has consisted of mostly ensure. Patient underwent ERCP on 4/22 and was found to have extensive choledocholithiasis and biliary purulence consistent with cholangitis and occluded biliary stent. Stent removed and replaced with new 10Fr/7cm plastic biliary stent. Cholangioscopy was not performed given active cholangitis. Repeated Hb dropped to 6.5. BP significantly dropped. ICU was consulted for severe hypotension and symptomatic anemia with suspected GI bleed      #Symptomatic anemia   #Severe Hypotension   #Liver cirrhosis   #Porcelain gallbladder   #Acute kidney injury   #Hyperkalemia     _________CNS___________    AAOx2-3  Not encephalopathic   Will start bowel regimen and lactulose       _________CVS___________  #Severe hypotension   Likely due to anemia   Started on IV fluids and Blood transfusion   Will monitor s/p transfusion     _________ RESP__________    #No issues     __________GI____________    #Symptomatic anemia   CT angio abd/ pelvis shows Cirrhosis. Common duct stent with markedly dilated common duct containing calculi and debris similar in appearance to 04/20/2022  Hb dropped from 8.4 to 6.4  Will start blood transfusion   Hold Plavix   Type and screen   IV fluids for Hypotension 1L IV bolus NS0.9%  GI was consulted   EGD AM   Octreotide drip   GI DR. Lemus   PPI BID   Monitor CBC s/p transfusion and then Q6    ________ RENAL__________  #KAY  Baseline normal Creatinine   Likely due to hypotension and Anemia   Will repeat BMP after IV fluids and Blood resuscitation  Send U lytes     #Hyperkalemia   Hold spironolactone   Now trended down s/p Cocktail   Will repeat BMP     _________MSK___________    no active issues    __________ID____________    #No issues     _________ENDO__________    #NPO    _______HEME/ONC_______    #As above     _________SKIN____________    - no issues    ________Prophylaxis_______  # PPI BID   # Hold AC for bleeding     __________GOC/ DISPO___________    FULL CODE   · Assessment	  82 year old male w/ PMH alcoholic liver cirrhosis, GI bleed, 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 Patient states that he has been chronic abdominal pain but since yesterday he has been having constipation with dark colored bowel movements. patient has not been able to eat over the last few months. His diet has consisted of mostly ensure. Patient underwent ERCP on 4/22 and was found to have extensive choledocholithiasis and biliary purulence consistent with cholangitis and occluded biliary stent. Stent removed and replaced with new 10Fr/7cm plastic biliary stent. Cholangioscopy was not performed given active cholangitis. Repeated Hb dropped to 6.5. BP significantly dropped. ICU was consulted for severe hypotension and symptomatic anemia with suspected GI bleed      #Symptomatic anemia   #Severe Hypotension   #Liver cirrhosis   #Porcelain gallbladder   #Acute kidney injury   #Hyperkalemia     _________CNS___________    AAOx2-3  -no acute issues  -no encephalopathy       _________CVS___________  #hypovolemic shock Likely due to GIB  (resolving)  - status post resuscitation with 3L crystalloid and 2 units PRBCs  - not requiring pressor support, pressures soft, but making good UOP  - continue octreotide and pantoprazole gtts  - for EGD this morning  - maintain active T&S and large bore IV access x2  - f/u CBC post transfusion   - trend CBC, transfuse if < 7  -hold all AC/antiplatelet agents        _________ RESP__________    #No issues     __________GI____________    #Symptomatic anemia 2/2 GIB  -CT angio abd/ pelvis shows Cirrhosis. Common duct stent with markedly dilated common duct containing calculi and debris similar in appearance to 04/20/2022  - status post resuscitation with 3L crystalloid and 2 units PRBCs  - not requiring pressor support, pressures soft, but making good UOP  - continue octreotide and pantoprazole gtts  - GI consult Dr Lemus   - for EGD this morning   - maintain active T&S and large bore IV access x2  - f/u CBC post transfusion   - trend CBC q8 hours, transfuse if < 7  - hold all AC/antiplatelet agents  -SBP prophylaxis ceftriaxone 2000mg q8 hours      ________ RENAL__________  #prerenal KAY 2/2 hypovolemic shock due to GIB   Baseline normal Creatinine   -remains elevated, will trend  -avoid nephrotoxins  -monitor UOP and BMP      #Hyperkalemia (resolved)  -s/p hyperkalemia cocktail  -monitor BMP  -plan as above    _________MSK___________    no active issues    __________ID____________    #No issues     _________ENDO__________    #NPO    _______HEME/ONC_______    #As above     _________SKIN____________    - no issues    ________Prophylaxis_______  # PPI BID   # Hold AC/anti-platelets for bleeding     __________GOC/ DISPO___________    FULL CODE

## 2023-02-08 NOTE — DIETITIAN INITIAL EVALUATION ADULT - OTHER INFO
Pt seen, asleep, on side. Discussed w/ ICU NP " no acute needs". Assessed 4/22 by this RD w/ severe PCM of chronic illness (wt 149.6#)

## 2023-02-08 NOTE — ADVANCED PRACTICE NURSE CONSULT - ASSESSMENT
This is a 82yr old male patient admitted for GI Hemorrhage, presenting with the following:  -There is a Stage 2 Pressure Injury to the L. Gluteus with pink tissue, scant drainage, and white wound edges  -There is a Stage 1 Pressure Injury to the Bilateral Heels, as evident by non-blanchable erythema  -There is a healed wound to the R. Gluteus

## 2023-02-08 NOTE — DIETITIAN INITIAL EVALUATION ADULT - NSFNSPHYEXAMSKINFT_GEN_A_CORE
Pressure Injury 1: Left:,buttocks, Stage II  Pressure Injury 2: Bilateral:,heel, Stage I  Pressure Injury 3: none, none  Pressure Injury 4: none, none  Pressure Injury 5: none, none  Pressure Injury 6: none, none  Pressure Injury 7: none, none  Pressure Injury 8: none, none  Pressure Injury 9: none, none  Pressure Injury 10: none, none  Pressure Injury 11: none, none, Pressure Injury 1: sacrum, Stage II  Pressure Injury 2: none, none  Pressure Injury 3: none, none  Pressure Injury 4: none, none  Pressure Injury 5: none, none  Pressure Injury 6: none, none  Pressure Injury 7: none, none  Pressure Injury 8: none, none  Pressure Injury 9: none, none  Pressure Injury 10: none, none  Pressure Injury 11: none, none, Pressure Injury 1: sacrum, Stage II  Pressure Injury 2: none, none  Pressure Injury 3: none, none  Pressure Injury 4: none, none  Pressure Injury 5: none, none  Pressure Injury 6: none, none  Pressure Injury 7: none, none  Pressure Injury 8: none, none  Pressure Injury 9: none, none  Pressure Injury 10: none, none  Pressure Injury 11: none, none

## 2023-02-09 NOTE — CONSULT NOTE ADULT - ASSESSMENT
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 Primary GI Dr. Lemus performed an EGD on 2/8/23 (varices in lower third of esophagus, old biliary stent not draining thus removed). Advanced GI Dr. Rinaldi consulted for ERCP.    Patient is well known to the GI service, collateral obtained from chart review.  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 Poor compliance and follow up with GI outpatient.     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 Advanced GI consulted for ERCP 2/2 CBD dilatation and cholelithiasis/choledo. CTAP notable for cirrhosis and common duct stent with markedly dilated common duct containing calculi and debris, similar in appearance to 4/20/22. LFTs wnl, VSS, afebrile. Anemic with Hgb 7.5 today, s/p 2u PRBC on 2/7 for Hgb 6.4, post transfusion Hgb 8.3, responded appropriately. s/p Octreotide gtt. Continues on IV Protonix infusion.     	- Tentative ERCP on 2/10, with Dr. Rinaldi  	- NPO pMN  	- Hold NSAIDS/AC x 24 hrs prior to procedure  	- Covid swab within 72 hrs of procedure  	- AM labs: CBC, CMP, PT/INR  	- Continue IV PPI gtt  	- Maintain active T&S, 2 large bore peripheral IVs, transfuse for goal Hgb >7 and plt >50    This note and its recommendations herein are preliminary until such time as cosigned by an attending.    GI will continue to follow.  Thank you for this consult!

## 2023-02-09 NOTE — PROGRESS NOTE ADULT - ASSESSMENT
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 Patient states that he has been chronic abdominal pain but since yesterday he has been having constipation with dark colored bowel movements. patient has not been able to eat over the last few months. His diet has consisted of mostly ensure. Patient underwent ERCP on 4/22 and was found to have extensive choledocholithiasis and biliary purulence consistent with cholangitis and occluded biliary stent. Stent removed and replaced with new 10Fr/7cm plastic biliary stent. Cholangioscopy was not performed given active cholangitis. Repeated Hb dropped to 6.5. BP significantly dropped. ICU was consulted for severe hypotension and symptomatic anemia with suspected GI bleed      #Symptomatic anemia   #Severe Hypotension   #Liver cirrhosis   #Porcelain gallbladder   #Acute kidney injury   #Hyperkalemia     _________CNS___________    AAOx2-3  Not encephalopathic   Will start bowel regimen and lactulose       _________CVS___________  #Severe hypotension   Likely due to anemia   Started on IV fluids and Blood transfusion   Will monitor s/p transfusion     _________ RESP__________    #No issues     __________GI____________    #Symptomatic anemia   CT angio abd/ pelvis shows Cirrhosis. Common duct stent with markedly dilated common duct containing calculi and debris similar in appearance to 04/20/2022  Hb dropped from 8.4 to 6.4  Will start blood transfusion   Hold Plavix   Type and screen   IV fluids for Hypotension 1L IV bolus NS0.9%  GI was consulted   EGD AM   Octreotide drip   GI DR. Lemus   PPI BID   Monitor CBC s/p transfusion and then Q6    ________ RENAL__________  #KAY  Baseline normal Creatinine   Likely due to hypotension and Anemia   Will repeat BMP after IV fluids and Blood resuscitation  Send U lytes     #Hyperkalemia   Hold spironolactone   Now trended down s/p Cocktail   Will repeat BMP     _________MSK___________    no active issues    __________ID____________    #No issues     _________ENDO__________    #NPO    _______HEME/ONC_______    #As above     _________SKIN____________    - no issues    ________Prophylaxis_______  # PPI BID   # Hold AC for bleeding     __________GOC/ DISPO___________    FULL CODE 82 year old male w/ PMH alcoholic liver cirrhosis, GI bleed, 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 Patient states that he has been chronic abdominal pain but since yesterday he has been having constipation with dark colored bowel movements. patient has not been able to eat over the last few months. His diet has consisted of mostly ensure. Patient underwent ERCP on 4/22 and was found to have extensive choledocholithiasis and biliary purulence consistent with cholangitis and occluded biliary stent. Stent removed and replaced with new 10Fr/7cm plastic biliary stent. Cholangioscopy was not performed given active cholangitis. Repeated Hb dropped to 6.5. BP significantly dropped. ICU was consulted for severe hypotension and symptomatic anemia with suspected GI bleed. Endoscopy performed 2/8 without identifiable source of bleeding.      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  Continue pantoprazole infusion  Maintain NPO  Tentative ERCP on 2/10 with Dr Nimco Lemus       ________ RENAL__________  #KAY  Baseline normal creatinine   Likely due to hypotension and anemia   Improving, BUN still elevated    #Hyperkalemia   Hold spironolactone   Holding diuretics in setting of symptomatic anemia    _________MSK___________  no active issues    __________ID____________  #No issues   Continue ceftriaxone for spontaneous bacterial peritonitis prophylaxis  _________ENDO__________  #Hypothyroidism  - Converted oral levothyroxine (home dose of 0.25 mcg) to IV     _______HEME/ONC_______  #As above     _________SKIN____________  - no issues    ________Prophylaxis_______  # Hold AC for bleeding     __________GOC/ DISPO___________    FULL CODE ASSESSMENT & PLAN  82 year old male w/ PMH alcoholic liver cirrhosis, GI bleed, 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 Patient states that he has been chronic abdominal pain but since yesterday he has been having constipation with dark colored bowel movements. patient has not been able to eat over the last few months. His diet has consisted of mostly ensure. Patient underwent ERCP on 4/22 and was found to have extensive choledocholithiasis and biliary purulence consistent with cholangitis and occluded biliary stent. Stent removed and replaced with new 10Fr/7cm plastic biliary stent. Cholangioscopy was not performed given active cholangitis. Repeated Hb dropped to 6.5. BP significantly dropped. ICU was consulted for severe hypotension and symptomatic anemia with suspected GI bleed. Endoscopy performed 2/8 without identifiable source of bleeding.    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  Continue pantoprazole infusion  Maintain NPO  Tentative ERCP on 2/10 with Dr Nimco Lemus       ________ RENAL__________  #KAY  Baseline normal creatinine   Likely due to hypotension and anemia   Improving, BUN still elevated    #Hyperkalemia   Hold spironolactone   Holding diuretics in setting of symptomatic anemia    _________MSK___________  no active issues    __________ID____________  #No issues   Continue ceftriaxone for spontaneous bacterial peritonitis prophylaxis  _________ENDO__________  #Hypothyroidism  - Converted oral levothyroxine (home dose of 0.25 mcg) to IV     _______HEME/ONC_______  #As above     _________SKIN____________  - no issues    ________Prophylaxis_______  # Hold AC for bleeding     __________GOC/ DISPO___________    FULL CODE ASSESSMENT & PLAN  82 year old male w/ PMH alcoholic liver cirrhosis, GI bleed, 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  Continue pantoprazole infusion  Maintain NPO  Tentative ERCP on 2/10 with Dr Nimco Lemus       ________ RENAL__________  #KAY  Baseline normal creatinine   Likely due to hypotension and anemia   Improving, BUN still elevated    #Hyperkalemia   Hold spironolactone   Holding diuretics in setting of symptomatic anemia    _________MSK___________  no active issues    __________ID____________  #No issues   Continue ceftriaxone for spontaneous bacterial peritonitis prophylaxis  _________ENDO__________  #Hypothyroidism  - Converted oral levothyroxine (home dose of 0.25 mcg) to IV     _______HEME/ONC_______  #As above     _________SKIN____________  - no issues    ________Prophylaxis_______  # Hold AC for bleeding     __________GOC/ DISPO___________    FULL CODE ASSESSMENT & PLAN  82 year old male w/ PMH alcoholic liver cirrhosis, GI bleed, 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  Continue pantoprazole infusion  Maintain NPO  Tentative ERCP on 2/10 with Dr Nimco Lemus       ________ RENAL__________  #KYA  Baseline normal creatinine   Likely due to hypotension and anemia   Improving, BUN still elevated    #Hyperkalemia   Hold spironolactone   Holding diuretics in setting of symptomatic anemia    _________MSK___________  no active issues    __________ID____________  #No issues   Continue ceftriaxone for spontaneous bacterial peritonitis prophylaxis  _________ENDO__________  #Hypothyroidism  - Converted oral levothyroxine (home dose of 0.25 mcg) to IV     _______HEME/ONC_______  #As above     _________SKIN____________  - no issues    ________Prophylaxis_______  # Hold AC for bleeding     __________GOC/ DISPO___________    FULL CODE ASSESSMENT & PLAN  82 year old male w/ PMH alcoholic liver cirrhosis, GI bleed, 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     Active Issues:  #Hemorrhagic shock (resolving)  #Liver cirrhosis   #Porcelain gallbladder   #Acute kidney injury   #Hyperkalemia     Plan:   _________CNS___________  No active issues  AAOx, not encephalopathic     _________CVS___________  #Hemorrhagic shock  Hypotensive secondary to GI hemorrhage during admission  Resolved with PRBC transfusions.   Hold home antihypertensives.     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  Continue pantoprazole infusion  Maintain NPO    #Biliary Stent  Old biliary stent noted on EGD to not be draining, removed during EDG.   Tentative ERCP on 2/10 with Dr Nimco Lemus     ________ RENAL__________  #KAY  Baseline normal creatinine   Likely due to hypotension and anemia   Improving, BUN still elevated    #Hyperkalemia   Hold spironolactone   Holding diuretics in setting of symptomatic anemia    __________ID____________  #SBP  Empiric coverage with Ceftriaxone.   Afebrile and WBCs normal.   Monitor and send cultures if temperature spikes.     _________ENDO__________  #Hypothyroidism  - Converted oral levothyroxine (home dose of 0.25 mcg) to IV     _______HEME/ONC_______  #As above     _________SKIN____________  - no issues    ________Prophylaxis_______  # Hold AC for bleeding     __________GOC/ DISPO___________  FULL CODE

## 2023-02-09 NOTE — PROCEDURE NOTE - ADDITIONAL PROCEDURE DETAILS
20g x 1.88 in peripheral IV placed in right brachial vein via ultrasound guidance. Confirmed placement in vessel in long/short axis after placement. VBG obtained for confirmation.

## 2023-02-09 NOTE — PROGRESS NOTE ADULT - SUBJECTIVE AND OBJECTIVE BOX
INTERVAL HPI/OVERNIGHT EVENTS: Two bloody bowel movements overnight.      PRESSORS: [ ] YES [ ] NO  WHICH:    ANTIBIOTICS:                  DATE STARTED:  ANTIBIOTICS:                  DATE STARTED:    Antimicrobial:  cefTRIAXone   IVPB 2000 milliGRAM(s) IV Intermittent every 24 hours    Cardiovascular:    Pulmonary:    Hematalogic:    Other:  chlorhexidine 2% Cloths 1 Application(s) Topical <User Schedule>  dextrose 5%. 1000 milliLiter(s) IV Continuous <Continuous>  levothyroxine Injectable 20 MICROGram(s) IV Push at bedtime  ondansetron Injectable 4 milliGRAM(s) IV Push once PRN  pantoprazole Infusion 8 mG/Hr IV Continuous <Continuous>    cefTRIAXone   IVPB 2000 milliGRAM(s) IV Intermittent every 24 hours  chlorhexidine 2% Cloths 1 Application(s) Topical <User Schedule>  dextrose 5%. 1000 milliLiter(s) IV Continuous <Continuous>  levothyroxine Injectable 20 MICROGram(s) IV Push at bedtime  ondansetron Injectable 4 milliGRAM(s) IV Push once PRN  pantoprazole Infusion 8 mG/Hr IV Continuous <Continuous>    Drug Dosing Weight  Height (cm): 177.8 (07 Feb 2023 11:14)  Weight (kg): 65.8 (07 Feb 2023 11:14)  BMI (kg/m2): 20.8 (07 Feb 2023 11:14)  BSA (m2): 1.82 (07 Feb 2023 11:14)    PHYSICAL EXAM:  GENERAL: NAD  EYES: EOMI, PERRLA  NECK: Supple, No JVD; Normal thyroid; Trachea midline: No LAD   NERVOUS SYSTEM:  Alert & Oriented X3,  Motor Strength 5/5 B/L upper and lower extremities; DTRs 2+ intact and symmetric  CHEST/LUNG: No rales, rhonchi, wheezing, breath sounds present bilaterally  HEART: Regular rate and rhythm; No murmurs, no gallops  ABDOMEN: Soft, Nontender, Nondistended; Bowel sounds present, no pain or masses on palpation  : voiding well, Reynolds in place  EXTREMITIES:  2+ Peripheral Pulses, No clubbing, cyanosis, or edema  SKIN: warm, intact, no lesions     LINES/DRAINS/DEVICES  CENTRAL LINE: [ ] YES [ ] NO  LOCATION:     REYNOLDS: [ ] YES [ ] NO     A-LINE:  [ ] YES [ ] NO  LOCATION:       ICU Vital Signs Last 24 Hrs  T(C): 36.9 (09 Feb 2023 04:53), Max: 37.1 (08 Feb 2023 20:00)  T(F): 98.5 (09 Feb 2023 04:53), Max: 98.7 (08 Feb 2023 20:00)  HR: 72 (09 Feb 2023 07:00) (65 - 109)  BP: 100/45 (09 Feb 2023 07:00) (77/49 - 132/70)  BP(mean): 62 (09 Feb 2023 07:00) (54 - 89)  ABP: --  ABP(mean): --  RR: 19 (09 Feb 2023 07:00) (14 - 33)  SpO2: 100% (09 Feb 2023 07:00) (98% - 100%)    O2 Parameters below as of 09 Feb 2023 06:00  Patient On (Oxygen Delivery Method): room air                  02-08 @ 07:01  -  02-09 @ 07:00  --------------------------------------------------------  IN: 1470 mL / OUT: 750 mL / NET: 720 mL              LABS:  CBC Full  -  ( 09 Feb 2023 05:29 )  WBC Count : 7.38 K/uL  RBC Count : 2.53 M/uL  Hemoglobin : 7.4 g/dL  Hematocrit : 23.4 %  Platelet Count - Automated : 107 K/uL  Mean Cell Volume : 92.5 fl  Mean Cell Hemoglobin : 29.2 pg  Mean Cell Hemoglobin Concentration : 31.6 gm/dL  Auto Neutrophil # : x  Auto Lymphocyte # : x  Auto Monocyte # : x  Auto Eosinophil # : x  Auto Basophil # : x  Auto Neutrophil % : x  Auto Lymphocyte % : x  Auto Monocyte % : x  Auto Eosinophil % : x  Auto Basophil % : x    02-09    149<H>  |  123<H>  |  49<H>  ----------------------------<  203<H>  4.1   |  19<L>  |  1.28    Ca    8.0<L>      09 Feb 2023 05:29  Phos  3.1     02-09  Mg     1.8     02-09    TPro  4.5<L>  /  Alb  2.0<L>  /  TBili  0.6  /  DBili  x   /  AST  21  /  ALT  17  /  AlkPhos  66  02-09    PT/INR - ( 07 Feb 2023 13:08 )   PT: 21.3 sec;   INR: 1.78 ratio         PTT - ( 07 Feb 2023 13:08 )  PTT:29.2 sec        RADIOLOGY & ADDITIONAL STUDIES REVIEWED DURING TEAM ROUNDS    [ ]GOALS OF CARE DISCUSSION WITH PATIENT/FAMILY/PROXY:    CRITICAL CARE TIME SPENT: 35 minutes           INTERVAL HPI/OVERNIGHT EVENTS: Endoscopy on 2/8 without active source of bleeding. Two bloody bowel movements overnight.      PRESSORS: [ ] YES [X] NO  WHICH:    ANTIBIOTICS: Ceftriaxone       DATE STARTED: 2/8    Antimicrobial:  cefTRIAXone   IVPB 2000 milliGRAM(s) IV Intermittent every 24 hours    Cardiovascular:    Pulmonary:    Hematalogic:    Other:  chlorhexidine 2% Cloths 1 Application(s) Topical <User Schedule>  dextrose 5%. 1000 milliLiter(s) IV Continuous <Continuous>  levothyroxine Injectable 20 MICROGram(s) IV Push at bedtime  ondansetron Injectable 4 milliGRAM(s) IV Push once PRN  pantoprazole Infusion 8 mG/Hr IV Continuous <Continuous>    cefTRIAXone   IVPB 2000 milliGRAM(s) IV Intermittent every 24 hours  chlorhexidine 2% Cloths 1 Application(s) Topical <User Schedule>  dextrose 5%. 1000 milliLiter(s) IV Continuous <Continuous>  levothyroxine Injectable 20 MICROGram(s) IV Push at bedtime  ondansetron Injectable 4 milliGRAM(s) IV Push once PRN  pantoprazole Infusion 8 mG/Hr IV Continuous <Continuous>    Drug Dosing Weight  Height (cm): 177.8 (07 Feb 2023 11:14)  Weight (kg): 65.8 (07 Feb 2023 11:14)  BMI (kg/m2): 20.8 (07 Feb 2023 11:14)  BSA (m2): 1.82 (07 Feb 2023 11:14)    PHYSICAL EXAM:  GENERAL: NAD  EYES: EOMI, PERRLA  NECK: Supple, No JVD; Normal thyroid; Trachea midline: No LAD   NERVOUS SYSTEM:  Alert & Oriented X3, no drift in upper or lower extremities  CHEST/LUNG: No rales, rhonchi, wheezing, breath sounds present bilaterally  HEART: Regular rate and rhythm; No murmurs, no gallops  ABDOMEN: Soft, nondistended with LLQ tenderness/ Bowel sounds present.  : External urinary catheter in place; voiding well.  EXTREMITIES:  2+ Peripheral Pulses, No clubbing, cyanosis, or edema  SKIN: warm, intact, no lesions     LINES/DRAINS/DEVICES  CENTRAL LINE: [ ] YES [X] NO  LOCATION:     RE[place]OLDS: [ ] YES [X] NO     A-LINE:  [ ] YES [X] NO  LOCATION:       ICU Vital Signs Last 24 Hrs  T(C): 36.9 (09 Feb 2023 04:53), Max: 37.1 (08 Feb 2023 20:00)  T(F): 98.5 (09 Feb 2023 04:53), Max: 98.7 (08 Feb 2023 20:00)  HR: 72 (09 Feb 2023 07:00) (65 - 109)  BP: 100/45 (09 Feb 2023 07:00) (77/49 - 132/70)  BP(mean): 62 (09 Feb 2023 07:00) (54 - 89)  ABP: --  ABP(mean): --  RR: 19 (09 Feb 2023 07:00) (14 - 33)  SpO2: 100% (09 Feb 2023 07:00) (98% - 100%)    O2 Parameters below as of 09 Feb 2023 06:00  Patient On (Oxygen Delivery Method): room air                  02-08 @ 07:01  -  02-09 @ 07:00  --------------------------------------------------------  IN: 1470 mL / OUT: 750 mL / NET: 720 mL              LABS:  CBC Full  -  ( 09 Feb 2023 05:29 )  WBC Count : 7.38 K/uL  RBC Count : 2.53 M/uL  Hemoglobin : 7.4 g/dL  Hematocrit : 23.4 %  Platelet Count - Automated : 107 K/uL  Mean Cell Volume : 92.5 fl  Mean Cell Hemoglobin : 29.2 pg  Mean Cell Hemoglobin Concentration : 31.6 gm/dL  Auto Neutrophil # : x  Auto Lymphocyte # : x  Auto Monocyte # : x  Auto Eosinophil # : x  Auto Basophil # : x  Auto Neutrophil % : x  Auto Lymphocyte % : x  Auto Monocyte % : x  Auto Eosinophil % : x  Auto Basophil % : x    02-09    149<H>  |  123<H>  |  49<H>  ----------------------------<  203<H>  4.1   |  19<L>  |  1.28    Ca    8.0<L>      09 Feb 2023 05:29  Phos  3.1     02-09  Mg     1.8     02-09    TPro  4.5<L>  /  Alb  2.0<L>  /  TBili  0.6  /  DBili  x   /  AST  21  /  ALT  17  /  AlkPhos  66  02-09    PT/INR - ( 07 Feb 2023 13:08 )   PT: 21.3 sec;   INR: 1.78 ratio         PTT - ( 07 Feb 2023 13:08 )  PTT:29.2 sec        RADIOLOGY & ADDITIONAL STUDIES REVIEWED DURING TEAM ROUNDS    [X]GOALS OF CARE DISCUSSION WITH PATIENT/FAMILY/PROXY: Discussed with patient at bedside.    CRITICAL CARE TIME SPENT: 35 minutes           INTERVAL HPI/OVERNIGHT EVENTS: Endoscopy on 2/8 without active source of bleeding. Two bloody bowel movements overnight.      PRESSORS: [ ] YES [X] NO  WHICH:    ANTIBIOTICS: Ceftriaxone       DATE STARTED: 2/8    Antimicrobial:  cefTRIAXone   IVPB 2000 milliGRAM(s) IV Intermittent every 24 hours    Cardiovascular:    Pulmonary:    Hematalogic:    Other:  chlorhexidine 2% Cloths 1 Application(s) Topical <User Schedule>  dextrose 5%. 1000 milliLiter(s) IV Continuous <Continuous>  levothyroxine Injectable 20 MICROGram(s) IV Push at bedtime  ondansetron Injectable 4 milliGRAM(s) IV Push once PRN  pantoprazole Infusion 8 mG/Hr IV Continuous <Continuous>    cefTRIAXone   IVPB 2000 milliGRAM(s) IV Intermittent every 24 hours  chlorhexidine 2% Cloths 1 Application(s) Topical <User Schedule>  dextrose 5%. 1000 milliLiter(s) IV Continuous <Continuous>  levothyroxine Injectable 20 MICROGram(s) IV Push at bedtime  ondansetron Injectable 4 milliGRAM(s) IV Push once PRN  pantoprazole Infusion 8 mG/Hr IV Continuous <Continuous>    Drug Dosing Weight  Height (cm): 177.8 (07 Feb 2023 11:14)  Weight (kg): 65.8 (07 Feb 2023 11:14)  BMI (kg/m2): 20.8 (07 Feb 2023 11:14)  BSA (m2): 1.82 (07 Feb 2023 11:14)    PHYSICAL EXAM:  GENERAL: NAD  EYES: EOMI, PERRL  NECK: Supple, No JVD; Normal thyroid; Trachea midline.   NERVOUS SYSTEM:  Alert & Oriented X3, no drift in upper or lower extremities  CHEST/LUNG: No rales, rhonchi, wheezing, breath sounds present bilaterally  HEART: Regular rate and rhythm; No murmurs, no gallops  ABDOMEN: Soft, nondistended with LLQ tenderness/ Bowel sounds present.  : External urinary catheter in place; voiding well.  EXTREMITIES:  2+ Peripheral Pulses, No clubbing, cyanosis, or edema  SKIN: warm, intact, no lesions     LINES/DRAINS/DEVICES  CENTRAL LINE: [ ] YES [X] NO  LOCATION:     RE[place]OLDS: [ ] YES [X] NO     A-LINE:  [ ] YES [X] NO  LOCATION:       ICU Vital Signs Last 24 Hrs  T(C): 36.9 (09 Feb 2023 04:53), Max: 37.1 (08 Feb 2023 20:00)  T(F): 98.5 (09 Feb 2023 04:53), Max: 98.7 (08 Feb 2023 20:00)  HR: 72 (09 Feb 2023 07:00) (65 - 109)  BP: 100/45 (09 Feb 2023 07:00) (77/49 - 132/70)  BP(mean): 62 (09 Feb 2023 07:00) (54 - 89)  ABP: --  ABP(mean): --  RR: 19 (09 Feb 2023 07:00) (14 - 33)  SpO2: 100% (09 Feb 2023 07:00) (98% - 100%)    O2 Parameters below as of 09 Feb 2023 06:00  Patient On (Oxygen Delivery Method): room air                  02-08 @ 07:01  -  02-09 @ 07:00  --------------------------------------------------------  IN: 1470 mL / OUT: 750 mL / NET: 720 mL              LABS:  CBC Full  -  ( 09 Feb 2023 05:29 )  WBC Count : 7.38 K/uL  RBC Count : 2.53 M/uL  Hemoglobin : 7.4 g/dL  Hematocrit : 23.4 %  Platelet Count - Automated : 107 K/uL  Mean Cell Volume : 92.5 fl  Mean Cell Hemoglobin : 29.2 pg  Mean Cell Hemoglobin Concentration : 31.6 gm/dL  Auto Neutrophil # : x  Auto Lymphocyte # : x  Auto Monocyte # : x  Auto Eosinophil # : x  Auto Basophil # : x  Auto Neutrophil % : x  Auto Lymphocyte % : x  Auto Monocyte % : x  Auto Eosinophil % : x  Auto Basophil % : x    02-09    149<H>  |  123<H>  |  49<H>  ----------------------------<  203<H>  4.1   |  19<L>  |  1.28    Ca    8.0<L>      09 Feb 2023 05:29  Phos  3.1     02-09  Mg     1.8     02-09    TPro  4.5<L>  /  Alb  2.0<L>  /  TBili  0.6  /  DBili  x   /  AST  21  /  ALT  17  /  AlkPhos  66  02-09    PT/INR - ( 07 Feb 2023 13:08 )   PT: 21.3 sec;   INR: 1.78 ratio         PTT - ( 07 Feb 2023 13:08 )  PTT:29.2 sec        RADIOLOGY & ADDITIONAL STUDIES REVIEWED DURING TEAM ROUNDS    [X]GOALS OF CARE DISCUSSION WITH PATIENT/FAMILY/PROXY: Discussed with patient at bedside.

## 2023-02-09 NOTE — CONSULT NOTE ADULT - SUBJECTIVE AND OBJECTIVE BOX
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 Patient states that he has been chronic abdominal pain but since yesterday he has been having constipation with dark colored bowel movements. patient has not been able to eat over the last few months. His diet has consisted of mostly ensure. Patient underwent ERCP on 4/22 and was found to have extensive choledocholithiasis and biliary purulence consistent with cholangitis and occluded biliary stent. Stent removed and replaced with new 10Fr/7cm plastic biliary stent. Cholangioscopy was not performed given active cholangitis. Patient considered poor surgical candidate for cholecystectomy. He was d/c with repeat ERCP in 2-3 months for stent removal and possible cholangioscopy. Patient denies any vomiting or any other symptoms including fevers, chills, headaches dizziness, chest pain, SOB, cough, hematuria, dysuria.     In ED VS: T 97.7 HR 85 T 97.6 RR 18 Spo2 96%RA  K 6.5  Cr 1.53  Hb 8.6  CT angio abd/ pelvis shows Cirrhosis.Common duct stent with markedly dilated common duct containing calculi and debris similar in appearance to 04/20/2022.  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)      Pancreatitis  2/2 choledocholithiasis s/p ERCP with stone extraction and plastic stent placement (11/2019, stent now removed)      Hematochezia  2/2 colonic AVMs s/p cauterization and hemorrhoids (11/2019)      Melena  2/2 duodenal ulcers s/p argon plasma coagulation (4/2020)      Chronic kidney disease (CKD)      H/O inguinal hernia repair      History of repair of hip fracture  R hip      History of hip replacement  10/2020        FH:  FAMILY HISTORY:  Family history of ovarian cancer (Sibling)      MEDS:  MEDICATIONS  (STANDING):  cefTRIAXone   IVPB 2000 milliGRAM(s) IV Intermittent every 24 hours  chlorhexidine 2% Cloths 1 Application(s) Topical <User Schedule>  levothyroxine Injectable 20 MICROGram(s) IV Push at bedtime  pantoprazole Infusion 8 mG/Hr (10 mL/Hr) IV Continuous <Continuous>    MEDICATIONS  (PRN):  ondansetron Injectable 4 milliGRAM(s) IV Push once PRN Nausea and/or Vomiting    Allergies    No Known Allergies    Intolerances          ROS: A detailed set of ROS were asked and negative except those outlined in GI HPI.  ______________________________________________________________________  PHYSICAL EXAM:  T(C): 36.7 (02-09-23 @ 07:00), Max: 37.1 (02-08-23 @ 20:00)  HR: 75 (02-09-23 @ 10:00)  BP: 106/44 (02-09-23 @ 10:00)  RR: 20 (02-09-23 @ 10:00)  SpO2: 100% (02-09-23 @ 10:00)  Wt(kg): --    02-08 - 02-09  --------------------------------------------------------  IN:    dextrose 5%: 720 mL    IV PiggyBack: 100 mL    IV PiggyBack: 100 mL    IV PiggyBack: 250 mL    Octreotide: 60 mL    Pantoprazole: 240 mL  Total IN: 1470 mL    OUT:    Incontinent per Condom Catheter (mL): 750 mL  Total OUT: 750 mL    Total NET: 720 mL      GEN: NAD  HEENT: EOMI, conjunctivae anicteric, neck supple, moist mucous membranes  PULM: LSCTAB, no wheezing, rales, or rhonchi  CV: RRR, no m/r/b  GI: Soft, NT, ND; +BS in all four quadrants, no ascites, no Garduno's sign  MSK: JACKSON, no edema  NEURO: A&O x 3, no gross deficits  ______________________________________________________________________  LABS:                        7.5    7.65  )-----------( 109      ( 09 Feb 2023 08:37 )             23.6     02-09    149<H>  |  123<H>  |  49<H>  ----------------------------<  203<H>  4.1   |  19<L>  |  1.28    Ca    8.0<L>      09 Feb 2023 05:29  Phos  3.1     02-09  Mg     1.8     02-09    TPro  4.5<L>  /  Alb  2.0<L>  /  TBili  0.6  /  DBili  x   /  AST  21  /  ALT  17  /  AlkPhos  66  02-09    LIVER FUNCTIONS - ( 09 Feb 2023 05:29 )  Alb: 2.0 g/dL / Pro: 4.5 g/dL / ALK PHOS: 66 U/L / ALT: 17 U/L DA / AST: 21 U/L / GGT: x           PT/INR - ( 07 Feb 2023 13:08 )   PT: 21.3 sec;   INR: 1.78 ratio         PTT - ( 07 Feb 2023 13:08 )  PTT:29.2 sec  ____________________________________________    IMAGING:  ERCP (6/22/21)  Impression:          - Endoscopic retrograde cholangiopancreatography with                   stent exchange, cholangioscopy with EHL, and stone                        removal.      ERCP (4/22/22)  Extensive choledocholithiasis and biliary purulence consistent with cholangitis  and occluded biliary stent. Stent removed and replaced with new 10Fr/7cm plastic  biliary stent. Cholangioscopy not performed given active cholangitis.     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  Otherwise normal esophagus.  Normal mucosa in the whole stomach.  Normal mucosa in the whole examined duodenum.  Old biliary stent present that was not draining. It was removed with a snare.

## 2023-02-09 NOTE — PROGRESS NOTE ADULT - CRITICAL CARE ATTENDING COMMENT
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 Patient states that he has been chronic abdominal pain but since yesterday he has been having constipation with dark colored bowel movements. patient has not been able to eat over the last few months. His diet has consisted of mostly ensure. Patient underwent ERCP on 4/22 and was found to have extensive choledocholithiasis and biliary purulence consistent with cholangitis and occluded biliary stent. Stent removed and replaced with new 10Fr/7cm plastic biliary stent. Cholangioscopy was not performed given active cholangitis. Repeated Hb dropped to 6.5. BP significantly dropped. ICU was consulted for hypotension and symptomatic anemia with suspected GI bleed    Assessment:  1. Acute blood loss anemia  2. Gastrointestinal bleeding  3. Hypovolemic shock   4. Chronic liver cirrhosis   5. Choledocholithiasis     Plan:  S/p EGD 2/8 no obvious source of bleeding  Cont. to transfuse blood products to maintain hgb > 7   Ceftriaxone for SBP prophylaxis   2 large bore IVs    Plan for ERCP by GI  Trend hgb   Active Type and screen   Advance diet per GI  PPI   SCD for DVT prophylaxis   Volume resuscitation    Strict IO   Monitor and replete electrolytes   Hemodynamic monitoring   Cont, ICU care
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 Patient states that he has been chronic abdominal pain but since yesterday he has been having constipation with dark colored bowel movements. patient has not been able to eat over the last few months. His diet has consisted of mostly ensure. Patient underwent ERCP on 4/22 and was found to have extensive choledocholithiasis and biliary purulence consistent with cholangitis and occluded biliary stent. Stent removed and replaced with new 10Fr/7cm plastic biliary stent. Cholangioscopy was not performed given active cholangitis. Repeated Hb dropped to 6.5. BP significantly dropped. ICU was consulted for hypotension and symptomatic anemia with suspected GI bleed    Assessment:  1. Acute blood loss anemia  2. Gastrointestinal bleeding  3. Hypovolemic shock   4. Chronic liver cirrhosis   5. Choledocholithiasis     Plan:  EGD this morning  Cont. to transfuse blood products to maintain hgb > 7   Ceftriaxone for SBP prophylaxis   2 large bore IVs    Active Type and screen   NPO   PPI and Octreotide infusions   SCD for DVT prophylaxis   Volume resuscitation    Strict IO   Monitor and replete electrolytes   Hemodynamic monitoring   Cont, ICU care

## 2023-02-09 NOTE — CONSULT NOTE ADULT - NS ATTEND AMEND GEN_ALL_CORE FT
- Choledocholithiasis.  - History of biliary stent, now removed.  - GI bleed.    Patient seen and examined. Presenting with GI bleed, melena, had unremarkable EGD but incidental stent in place, removed during EGD. [facility] consulted for choledocholithiasis. Patient with history of stent placement, no showed to multiple outpatient office appointments to remove. Will plan on repeat ERCP for stone mgmt this admission once stable from medical perspective, likely tomorrow. NPO after midnight.
Agree with above

## 2023-02-09 NOTE — PROGRESS NOTE ADULT - PROBLEM SELECTOR PLAN 1
CT angio abd/ pelvis shows Cirrhosis. Common duct stent with markedly dilated common duct containing calculi and debris similar in appearance to 04/20/2022.  GI consulted Dr. Lemus    - PPI IV drip s/p EGD s/p EGD on 2/8/23 with non-bleeding varices and old biliary stent removal. [facility] consulted for ERCP 2/2 CBD dilatation and cholelithiasis/choledo.     ERCP tomorrow   Transfuse prn   Plan as per ICU

## 2023-02-09 NOTE — PROGRESS NOTE ADULT - SUBJECTIVE AND OBJECTIVE BOX
Patient is a 82y old  Male who presents with a chief complaint of abdominal pain (09 Feb 2023 10:56)      SUBJECTIVE / OVERNIGHT EVENTS: Transferred to ICU   s/p Hypotension GI Bleed   s/p EGD Gastritis     MEDICATIONS  (STANDING):  cefTRIAXone   IVPB 2000 milliGRAM(s) IV Intermittent every 24 hours  chlorhexidine 2% Cloths 1 Application(s) Topical <User Schedule>  levothyroxine Injectable 20 MICROGram(s) IV Push at bedtime  pantoprazole Infusion 8 mG/Hr (10 mL/Hr) IV Continuous <Continuous>    MEDICATIONS  (PRN):  ondansetron Injectable 4 milliGRAM(s) IV Push once PRN Nausea and/or Vomiting      CAPILLARY BLOOD GLUCOSE      POCT Blood Glucose.: 135 mg/dL (09 Feb 2023 17:31)  POCT Blood Glucose.: 175 mg/dL (09 Feb 2023 12:15)    I&O's Summary    08 Feb 2023 07:01  -  09 Feb 2023 07:00  --------------------------------------------------------  IN: 1470 mL / OUT: 750 mL / NET: 720 mL    09 Feb 2023 07:01  -  09 Feb 2023 17:34  --------------------------------------------------------  IN: 220 mL / OUT: 0 mL / NET: 220 mL      T(C): 36.4 (02-09-23 @ 16:00), Max: 36.7 (02-09-23 @ 07:00)  HR: 59 (02-09-23 @ 16:00) (59 - 77)  BP: 102/42 (02-09-23 @ 16:00) (89/45 - 110/46)  RR: 20 (02-09-23 @ 16:00) (15 - 23)  SpO2: 100% (02-09-23 @ 16:00) (98% - 100%)    PHYSICAL EXAM:  GENERAL: NAD, well-developed  HEAD:  Atraumatic, Normocephalic  EYES: EOMI, PERRLA, conjunctiva and sclera clear  NECK: Supple, No JVD  CHEST/LUNG: Clear to auscultation bilaterally; No wheeze  HEART: Regular rate and rhythm; No murmurs, rubs, or gallops  ABDOMEN: Soft, Nontender, Nondistended; Bowel sounds present  EXTREMITIES:  2+ Peripheral Pulses, No clubbing, cyanosis, or edema  PSYCH: AAOx3  NEUROLOGY: non-focal  SKIN: No rashes or lesions    LABS:                        7.2    7.28  )-----------( 116      ( 09 Feb 2023 15:55 )             22.9     02-09    149<H>  |  123<H>  |  49<H>  ----------------------------<  203<H>  4.1   |  19<L>  |  1.28    Ca    8.0<L>      09 Feb 2023 05:29  Phos  3.1     02-09  Mg     1.8     02-09    TPro  4.5<L>  /  Alb  2.0<L>  /  TBili  0.6  /  DBili  x   /  AST  21  /  ALT  17  /  AlkPhos  66  02-09              RADIOLOGY & ADDITIONAL TESTS:    Imaging Personally Reviewed:    Consultant(s) Notes Reviewed:      Care Discussed with Consultants/Other Providers:

## 2023-02-10 NOTE — PROGRESS NOTE ADULT - PROBLEM SELECTOR PLAN 1
CT angio abd/ pelvis shows Cirrhosis. Common duct stent with markedly dilated common duct containing calculi and debris similar in appearance to 04/20/2022.  GI consulted Dr. Lemus    - PPI IV drip s/p EGD s/p EGD on 2/8/23 with non-bleeding varices and old biliary stent removal. [facility] consulted for ERCP 2/2 CBD dilatation and cholelithiasis/choledo.     ERCP today    Transfuse prn   Plan as per ICU

## 2023-02-10 NOTE — PROGRESS NOTE ADULT - SUBJECTIVE AND OBJECTIVE BOX
Patient is a 82y old  Male who presents with a chief complaint of abdominal pain (09 Feb 2023 10:56)      SUBJECTIVE / OVERNIGHT EVENTS: Transferred to ICU   s/p Hypotension GI Bleed   s/p EGD Gastritis     T(C): 36.3 (02-10-23 @ 23:15), Max: 36.3 (02-10-23 @ 23:15)  HR: 62 (02-10-23 @ 22:00) (54 - 97)  BP: 132/78 (02-10-23 @ 22:00) (109/87 - 132/78)  RR: 13 (02-10-23 @ 22:00) (12 - 23)  SpO2: 99% (02-10-23 @ 22:00) (92% - 100%)      MEDICATIONS  (STANDING):  cefTRIAXone   IVPB 2000 milliGRAM(s) IV Intermittent every 24 hours  chlorhexidine 2% Cloths 1 Application(s) Topical <User Schedule>  dextrose 5%. 1000 milliLiter(s) (60 mL/Hr) IV Continuous <Continuous>  indomethacin Suppository 100 milliGRAM(s) Rectal once  levothyroxine Injectable 20 MICROGram(s) IV Push at bedtime  mupirocin 2% Ointment 1 Application(s) Topical two times a day  pantoprazole Infusion 8 mG/Hr (10 mL/Hr) IV Continuous <Continuous>    MEDICATIONS  (PRN):  ondansetron Injectable 4 milliGRAM(s) IV Push once PRN Nausea and/or Vomiting    PHYSICAL EXAM:  GENERAL: NAD, well-developed  HEAD:  Atraumatic, Normocephalic  EYES: EOMI, PERRLA, conjunctiva and sclera clear  NECK: Supple, No JVD  CHEST/LUNG: Clear to auscultation bilaterally; No wheeze  HEART: Regular rate and rhythm; No murmurs, rubs, or gallops  ABDOMEN: Soft, Nontender, Nondistended; Bowel sounds present  EXTREMITIES:  2+ Peripheral Pulses, No clubbing, cyanosis, or edema  PSYCH: AAOx3  NEUROLOGY: non-focal  SKIN: No rashes or lesions                              8.5    5.79  )-----------( 31       ( 10 Feb 2023 11:50 )             26.7           LIVER FUNCTIONS - ( 09 Feb 2023 05:29 )  Alb: 2.0 g/dL / Pro: 4.5 g/dL / ALK PHOS: 66 U/L / ALT: 17 U/L DA / AST: 21 U/L / GGT: x           PT/INR - ( 10 Feb 2023 08:00 )   PT: 18.7 sec;   INR: 1.56 ratio         PTT - ( 10 Feb 2023 08:00 )  PTT:22.5 sec  152|124|36<124  3.6|23|1.14  8.2,1.8,2.0  02-10 @ 08:00            RADIOLOGY & ADDITIONAL TESTS:    Imaging Personally Reviewed:    Consultant(s) Notes Reviewed:      Care Discussed with Consultants/Other Providers:

## 2023-02-10 NOTE — PROGRESS NOTE ADULT - SUBJECTIVE AND OBJECTIVE BOX
INTERVAL HPI/OVERNIGHT EVENTS:       PRESSORS: [ ] YES [ ] NO  WHICH:    ANTIBIOTICS:                  DATE STARTED:  ANTIBIOTICS:                  DATE STARTED:    Antimicrobial:  cefTRIAXone   IVPB 2000 milliGRAM(s) IV Intermittent every 24 hours    Cardiovascular:    Pulmonary:    Hematalogic:    Other:  chlorhexidine 2% Cloths 1 Application(s) Topical <User Schedule>  indomethacin Suppository 100 milliGRAM(s) Rectal once  levothyroxine Injectable 20 MICROGram(s) IV Push at bedtime  mupirocin 2% Ointment 1 Application(s) Topical two times a day  ondansetron Injectable 4 milliGRAM(s) IV Push once PRN  pantoprazole Infusion 8 mG/Hr IV Continuous <Continuous>    cefTRIAXone   IVPB 2000 milliGRAM(s) IV Intermittent every 24 hours  chlorhexidine 2% Cloths 1 Application(s) Topical <User Schedule>  indomethacin Suppository 100 milliGRAM(s) Rectal once  levothyroxine Injectable 20 MICROGram(s) IV Push at bedtime  mupirocin 2% Ointment 1 Application(s) Topical two times a day  ondansetron Injectable 4 milliGRAM(s) IV Push once PRN  pantoprazole Infusion 8 mG/Hr IV Continuous <Continuous>    Drug Dosing Weight  Height (cm): 177.8 (07 Feb 2023 11:14)  Weight (kg): 65.8 (07 Feb 2023 11:14)  BMI (kg/m2): 20.8 (07 Feb 2023 11:14)  BSA (m2): 1.82 (07 Feb 2023 11:14)    PHYSICAL EXAM:  GENERAL: NAD  EYES: EOMI, PERRLA  NECK: Supple, No JVD; Normal thyroid; Trachea midline: No LAD   NERVOUS SYSTEM:  Alert & Oriented X3,  Motor Strength 5/5 B/L upper and lower extremities; DTRs 2+ intact and symmetric  CHEST/LUNG: No rales, rhonchi, wheezing, breath sounds present bilaterally  HEART: Regular rate and rhythm; No murmurs, no gallops  ABDOMEN: Soft, Nontender, Nondistended; Bowel sounds present, no pain or masses on palpation  : voiding well, Reynolds in place  EXTREMITIES:  2+ Peripheral Pulses, No clubbing, cyanosis, or edema  SKIN: warm, intact, no lesions     LINES/DRAINS/DEVICES  CENTRAL LINE: [ ] YES [ ] NO  LOCATION:     REYNOLDS: [ ] YES [ ] NO     A-LINE:  [ ] YES [ ] NO  LOCATION:       ICU Vital Signs Last 24 Hrs  T(C): 36.1 (10 Feb 2023 04:00), Max: 36.7 (09 Feb 2023 20:20)  T(F): 96.9 (10 Feb 2023 04:00), Max: 98.1 (09 Feb 2023 20:20)  HR: 62 (10 Feb 2023 07:00) (52 - 90)  BP: 91/39 (10 Feb 2023 07:00) (83/41 - 116/65)  BP(mean): 55 (10 Feb 2023 07:00) (53 - 79)  ABP: --  ABP(mean): --  RR: 14 (10 Feb 2023 07:00) (14 - 23)  SpO2: 100% (10 Feb 2023 07:00) (98% - 100%)    O2 Parameters below as of 10 Feb 2023 07:00  Patient On (Oxygen Delivery Method): room air                  02-09 @ 07:01  -  02-10 @ 07:00  --------------------------------------------------------  IN: 400 mL / OUT: 1350 mL / NET: -950 mL              LABS:  CBC Full  -  ( 10 Feb 2023 04:25 )  WBC Count : 5.96 K/uL  RBC Count : 2.42 M/uL  Hemoglobin : 7.2 g/dL  Hematocrit : 22.6 %  Platelet Count - Automated : 91 K/uL  Mean Cell Volume : 93.4 fl  Mean Cell Hemoglobin : 29.8 pg  Mean Cell Hemoglobin Concentration : 31.9 gm/dL  Auto Neutrophil # : x  Auto Lymphocyte # : x  Auto Monocyte # : x  Auto Eosinophil # : x  Auto Basophil # : x  Auto Neutrophil % : x  Auto Lymphocyte % : x  Auto Monocyte % : x  Auto Eosinophil % : x  Auto Basophil % : x    02-09    149<H>  |  123<H>  |  49<H>  ----------------------------<  203<H>  4.1   |  19<L>  |  1.28    Ca    8.0<L>      09 Feb 2023 05:29  Phos  3.1     02-09  Mg     1.8     02-09    TPro  4.5<L>  /  Alb  2.0<L>  /  TBili  0.6  /  DBili  x   /  AST  21  /  ALT  17  /  AlkPhos  66  02-09            RADIOLOGY & ADDITIONAL STUDIES REVIEWED DURING TEAM ROUNDS    [ ]GOALS OF CARE DISCUSSION WITH PATIENT/FAMILY/PROXY:     INTERVAL HPI/OVERNIGHT EVENTS: Hypotensive overnight. Given 1 unit PRBC in AM. ERCP scheduled for today 2/10.      PRESSORS: [ ] YES [X] NO  WHICH:    ANTIBIOTICS: Ceftriaxone       DATE STARTED: 2/8    Antimicrobial:  cefTRIAXone   IVPB 2000 milliGRAM(s) IV Intermittent every 24 hours    Cardiovascular:    Pulmonary:    Hematalogic:    Other:  chlorhexidine 2% Cloths 1 Application(s) Topical <User Schedule>  indomethacin Suppository 100 milliGRAM(s) Rectal once  levothyroxine Injectable 20 MICROGram(s) IV Push at bedtime  mupirocin 2% Ointment 1 Application(s) Topical two times a day  ondansetron Injectable 4 milliGRAM(s) IV Push once PRN  pantoprazole Infusion 8 mG/Hr IV Continuous <Continuous>    cefTRIAXone   IVPB 2000 milliGRAM(s) IV Intermittent every 24 hours  chlorhexidine 2% Cloths 1 Application(s) Topical <User Schedule>  indomethacin Suppository 100 milliGRAM(s) Rectal once  levothyroxine Injectable 20 MICROGram(s) IV Push at bedtime  mupirocin 2% Ointment 1 Application(s) Topical two times a day  ondansetron Injectable 4 milliGRAM(s) IV Push once PRN  pantoprazole Infusion 8 mG/Hr IV Continuous <Continuous>    Drug Dosing Weight  Height (cm): 177.8 (07 Feb 2023 11:14)  Weight (kg): 65.8 (07 Feb 2023 11:14)  BMI (kg/m2): 20.8 (07 Feb 2023 11:14)  BSA (m2): 1.82 (07 Feb 2023 11:14)    PHYSICAL EXAM:  GENERAL: NAD  EYES: EOMI, PERRL  NECK: Supple, No JVD; Normal thyroid; Trachea midline.   NERVOUS SYSTEM:  Alert & Oriented X3, no drift in upper or lower extremities  CHEST/LUNG: No rales, rhonchi, wheezing, breath sounds present bilaterally  HEART: Regular rate and rhythm; No murmurs, no gallops  ABDOMEN: Soft, nondistended with LLQ tenderness/ Bowel sounds present.  : External urinary catheter in place; voiding well.  EXTREMITIES:  2+ Peripheral Pulses, No clubbing, cyanosis, or edema  SKIN: erythematous and tender patch of skin noted on proximal left forearm. Left gluteal stage II pressure injury and skin tears.    LINES/DRAINS/DEVICES  CENTRAL LINE: [ ] YES [X] NO  LOCATION:     [place]: [ ] YES [X] NO     A-LINE:  [ ] YES [X] NO  LOCATION:       ICU Vital Signs Last 24 Hrs  T(C): 36.1 (10 Feb 2023 04:00), Max: 36.7 (09 Feb 2023 20:20)  T(F): 96.9 (10 Feb 2023 04:00), Max: 98.1 (09 Feb 2023 20:20)  HR: 62 (10 Feb 2023 07:00) (52 - 90)  BP: 91/39 (10 Feb 2023 07:00) (83/41 - 116/65)  BP(mean): 55 (10 Feb 2023 07:00) (53 - 79)  ABP: --  ABP(mean): --  RR: 14 (10 Feb 2023 07:00) (14 - 23)  SpO2: 100% (10 Feb 2023 07:00) (98% - 100%)    O2 Parameters below as of 10 Feb 2023 07:00  Patient On (Oxygen Delivery Method): room air                  02-09 @ 07:01  -  02-10 @ 07:00  --------------------------------------------------------  IN: 400 mL / OUT: 1350 mL / NET: -950 mL              LABS:  CBC Full  -  ( 10 Feb 2023 04:25 )  WBC Count : 5.96 K/uL  RBC Count : 2.42 M/uL  Hemoglobin : 7.2 g/dL  Hematocrit : 22.6 %  Platelet Count - Automated : 91 K/uL  Mean Cell Volume : 93.4 fl  Mean Cell Hemoglobin : 29.8 pg  Mean Cell Hemoglobin Concentration : 31.9 gm/dL  Auto Neutrophil # : x  Auto Lymphocyte # : x  Auto Monocyte # : x  Auto Eosinophil # : x  Auto Basophil # : x  Auto Neutrophil % : x  Auto Lymphocyte % : x  Auto Monocyte % : x  Auto Eosinophil % : x  Auto Basophil % : x    02-09    149<H>  |  123<H>  |  49<H>  ----------------------------<  203<H>  4.1   |  19<L>  |  1.28    Ca    8.0<L>      09 Feb 2023 05:29  Phos  3.1     02-09  Mg     1.8     02-09    TPro  4.5<L>  /  Alb  2.0<L>  /  TBili  0.6  /  DBili  x   /  AST  21  /  ALT  17  /  AlkPhos  66  02-09            RADIOLOGY & ADDITIONAL STUDIES REVIEWED DURING TEAM ROUNDS    [ ]GOALS OF CARE DISCUSSION WITH PATIENT/FAMILY/PROXY:     INTERVAL HPI/OVERNIGHT EVENTS: Hypotensive overnight. Given 1 unit PRBC in AM. No bloody bowel movements. ERCP scheduled for today 2/10.      PRESSORS: [ ] YES [X] NO  WHICH:    ANTIBIOTICS: Ceftriaxone       DATE STARTED: 2/8    Antimicrobial:  cefTRIAXone   IVPB 2000 milliGRAM(s) IV Intermittent every 24 hours    Cardiovascular:    Pulmonary:    Hematalogic:    Other:  chlorhexidine 2% Cloths 1 Application(s) Topical <User Schedule>  indomethacin Suppository 100 milliGRAM(s) Rectal once  levothyroxine Injectable 20 MICROGram(s) IV Push at bedtime  mupirocin 2% Ointment 1 Application(s) Topical two times a day  ondansetron Injectable 4 milliGRAM(s) IV Push once PRN  pantoprazole Infusion 8 mG/Hr IV Continuous <Continuous>    cefTRIAXone   IVPB 2000 milliGRAM(s) IV Intermittent every 24 hours  chlorhexidine 2% Cloths 1 Application(s) Topical <User Schedule>  indomethacin Suppository 100 milliGRAM(s) Rectal once  levothyroxine Injectable 20 MICROGram(s) IV Push at bedtime  mupirocin 2% Ointment 1 Application(s) Topical two times a day  ondansetron Injectable 4 milliGRAM(s) IV Push once PRN  pantoprazole Infusion 8 mG/Hr IV Continuous <Continuous>    Drug Dosing Weight  Height (cm): 177.8 (07 Feb 2023 11:14)  Weight (kg): 65.8 (07 Feb 2023 11:14)  BMI (kg/m2): 20.8 (07 Feb 2023 11:14)  BSA (m2): 1.82 (07 Feb 2023 11:14)    PHYSICAL EXAM:  GENERAL: NAD  EYES: EOMI, PERRL  NECK: Supple, No JVD; Normal thyroid; Trachea midline.   NERVOUS SYSTEM:  Alert & Oriented X3, no drift in upper or lower extremities  CHEST/LUNG: No rales, rhonchi, wheezing, breath sounds present bilaterally  HEART: Regular rate and rhythm; No murmurs, no gallops  ABDOMEN: Soft, nondistended with LLQ tenderness/ Bowel sounds present.  : External urinary catheter in place; voiding well.  EXTREMITIES:  2+ Peripheral Pulses, No clubbing, cyanosis, or edema  SKIN: erythematous and tender patch of skin noted on proximal left forearm. Left gluteal stage II pressure injury and skin tears.    LINES/DRAINS/DEVICES  CENTRAL LINE: [ ] YES [X] NO  LOCATION:     [place]: [ ] YES [X] NO     A-LINE:  [ ] YES [X] NO  LOCATION:       ICU Vital Signs Last 24 Hrs  T(C): 36.1 (10 Feb 2023 04:00), Max: 36.7 (09 Feb 2023 20:20)  T(F): 96.9 (10 Feb 2023 04:00), Max: 98.1 (09 Feb 2023 20:20)  HR: 62 (10 Feb 2023 07:00) (52 - 90)  BP: 91/39 (10 Feb 2023 07:00) (83/41 - 116/65)  BP(mean): 55 (10 Feb 2023 07:00) (53 - 79)  ABP: --  ABP(mean): --  RR: 14 (10 Feb 2023 07:00) (14 - 23)  SpO2: 100% (10 Feb 2023 07:00) (98% - 100%)    O2 Parameters below as of 10 Feb 2023 07:00  Patient On (Oxygen Delivery Method): room air                  02-09 @ 07:01  -  02-10 @ 07:00  --------------------------------------------------------  IN: 400 mL / OUT: 1350 mL / NET: -950 mL              LABS:  CBC Full  -  ( 10 Feb 2023 04:25 )  WBC Count : 5.96 K/uL  RBC Count : 2.42 M/uL  Hemoglobin : 7.2 g/dL  Hematocrit : 22.6 %  Platelet Count - Automated : 91 K/uL  Mean Cell Volume : 93.4 fl  Mean Cell Hemoglobin : 29.8 pg  Mean Cell Hemoglobin Concentration : 31.9 gm/dL  Auto Neutrophil # : x  Auto Lymphocyte # : x  Auto Monocyte # : x  Auto Eosinophil # : x  Auto Basophil # : x  Auto Neutrophil % : x  Auto Lymphocyte % : x  Auto Monocyte % : x  Auto Eosinophil % : x  Auto Basophil % : x    02-09    149<H>  |  123<H>  |  49<H>  ----------------------------<  203<H>  4.1   |  19<L>  |  1.28    Ca    8.0<L>      09 Feb 2023 05:29  Phos  3.1     02-09  Mg     1.8     02-09    TPro  4.5<L>  /  Alb  2.0<L>  /  TBili  0.6  /  DBili  x   /  AST  21  /  ALT  17  /  AlkPhos  66  02-09            RADIOLOGY & ADDITIONAL STUDIES REVIEWED DURING TEAM ROUNDS    [ ]GOALS OF CARE DISCUSSION WITH PATIENT/FAMILY/PROXY:

## 2023-02-10 NOTE — CHART NOTE - NSCHARTNOTEFT_GEN_A_CORE
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 Patient states that he has been chronic abdominal pain but since yesterday he has been having constipation with dark colored bowel movements. patient has not been able to eat over the last few months. His diet has consisted of mostly ensure. Patient underwent ERCP on 4/22 and was found to have extensive choledocholithiasis and biliary purulence consistent with cholangitis and occluded biliary stent. Stent removed and replaced with new 10Fr/7cm plastic biliary stent. Cholangioscopy was not performed given active cholangitis. Patient considered poor surgical candidate for cholecystectomy. He was d/c with repeat ERCP in 2-3 months for stent removal and possible cholangioscopy. Patient denies any vomiting or any other symptoms including fevers, chills, headaches dizziness, chest pain, SOB, cough, hematuria, dysuria.   CT angio abd/ pelvis shows Cirrhosis. Common duct stent with markedly dilated common duct containing calculi and debris similar in appearance   ERCP canceled due to PLT of 31. Patient's LFTs  are normal. Risks outweigh benefits. Will Hold off of ERCP. Will reconsider next week.

## 2023-02-10 NOTE — PROGRESS NOTE ADULT - ASSESSMENT
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     Active Issues:  #Hemorrhagic shock (resolving)  #Liver cirrhosis   #Porcelain gallbladder   #Acute kidney injury   #Hyperkalemia     Plan:   _________CNS___________  No active issues  AAOx, not encephalopathic     _________CVS___________  #Hemorrhagic shock  Hypotensive secondary to GI hemorrhage during admission  Resolved with PRBC transfusions.   Hold home antihypertensives.     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  Continue pantoprazole infusion  Maintain NPO    #Biliary Stent  Old biliary stent noted on EGD to not be draining, removed during EDG.   Tentative ERCP on 2/10 with Dr Nimco Lemus     ________ RENAL__________  #KAY  Baseline normal creatinine   Likely due to hypotension and anemia   Improving, BUN still elevated    #Hyperkalemia   Hold spironolactone   Holding diuretics in setting of symptomatic anemia    __________ID____________  #SBP  Empiric coverage with Ceftriaxone.   Afebrile and WBCs normal.   Monitor and send cultures if temperature spikes.     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     Active Issues:  #Hemorrhagic shock (resolving)  #Liver cirrhosis   #Porcelain gallbladder   #Acute kidney injury   #Hyperkalemia     Plan:   _________CNS___________  No active issues  AAOx, not encephalopathic     _________CVS___________  #Hemorrhagic shock  Hypotensive secondary to GI hemorrhage during admission  Resolving with PRBC transfusions.   Holding home antihypertensives.   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  Continue pantoprazole infusion  Maintain NPO    #Biliary Stent  Old biliary stent noted on EGD to not be draining, removed during EDG.   Tentative ERCP on 2/10 with Dr Nimco Lemus     ________ RENAL__________  #KAY  Baseline normal creatinine   Likely due to hypotension and anemia   Improving, BUN still elevated    #Hyperkalemia   Hold spironolactone   Holding diuretics in setting of symptomatic anemia    __________ID____________  #SBP  Empiric coverage with Ceftriaxone.   Afebrile and WBCs normal.   Monitor and send cultures if temperature spikes.     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     Active Issues:  #Hemorrhagic shock (resolving)  #Liver cirrhosis   #Porcelain gallbladder   #Non-oliguric acute kidney injury   #Hyperkalemia   #Hypernatremia  #Hypothyroidism  #Cellulitis (right forearm)    Plan:   _________CNS___________  No active issues  AAOx, not encephalopathic     _________CVS___________  #Hemorrhagic shock  Hypotensive secondary to GI hemorrhage during admission  Resolving with PRBC transfusions.   Holding home antihypertensives.   Hypotensive overnight and given 1 PRBC.    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  Continue pantoprazole infusion  Maintain NPO      #Biliary Stent  Old biliary stent noted on EGD to not be draining, removed during EDG.   Tentative ERCP on 2/10 with Dr Nimco Lemus     ________ RENAL__________  #KAY (Improving)  Baseline normal creatinine   Likely due to hypotension and anemia       #Hyperkalemia   Hold spironolactone   Holding diuretics in setting of symptomatic anemia    #Hypernatremia  In setting of prolonged NPO period  Start D5W at 60ml/hour    __________ID____________  #SBP  Empiric coverage with Ceftriaxone.   Afebrile and WBCs normal.   Monitor and send cultures if temperature spikes.     #MRSA colonization  - Started Bactroban for positive MRSA nasal swab    _________ENDO__________  #Hypothyroidism  - Converted oral levothyroxine (home dose of 0.25 mcg) to IV     _______HEME/ONC_______  #As above   - Vit K given per GI for elevated INR prior to MRCP    _________SKIN____________  #Cellulitis  - Noted to have erythematous, tender patch on proximal left forearm  - Patient on ceftriaxone, appropriate coverage  - Monitor for resolution/exacerbation    ________Prophylaxis_______  #Hold AC for bleeding     __________GOC/ DISPO___________  FULL CODE

## 2023-02-10 NOTE — PROGRESS NOTE ADULT - NS ATTEND AMEND GEN_ALL_CORE FT
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 Patient states that he has been chronic abdominal pain but since yesterday he has been having constipation with dark colored bowel movements. patient has not been able to eat over the last few months. His diet has consisted of mostly ensure. Patient underwent ERCP on 4/22 and was found to have extensive choledocholithiasis and biliary purulence consistent with cholangitis and occluded biliary stent. Stent removed and replaced with new 10Fr/7cm plastic biliary stent. Cholangioscopy was not performed given active cholangitis. Repeated Hb dropped to 6.5. BP significantly dropped. ICU was consulted for hypotension and symptomatic anemia with suspected GI bleed    Assessment:  1. Acute blood loss anemia  2. Gastrointestinal bleeding  3. Hypovolemic shock   4. Chronic liver cirrhosis   5. Choledocholithiasis     Plan:  S/p EGD 2/8 no obvious source of bleeding  Cont. 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 Patient states that he has been chronic abdominal pain but since yesterday he has been having constipation with dark colored bowel movements. patient has not been able to eat over the last few months. His diet has consisted of mostly ensure. Patient underwent ERCP on 4/22 and was found to have extensive choledocholithiasis and biliary purulence consistent with cholangitis and occluded biliary stent. Stent removed and replaced with new 10Fr/7cm plastic biliary stent. Cholangioscopy was not performed given active cholangitis. Repeated Hb dropped to 6.5. BP significantly dropped. ICU was consulted for hypotension and symptomatic anemia with suspected GI bleed    Assessment:  1. Acute blood loss anemia  2. Gastrointestinal bleeding  3. Hypovolemic shock   4. Chronic liver cirrhosis   5. Choledocholithiasis     Plan:  S/p EGD 2/8 no obvious source of bleeding  Cont. to transfuse blood products to maintain hgb > 7   Transfused 1 unit PRBC for ERCP  ERCP cancelled for thrombocytopenia   Ceftriaxone for SBP prophylaxis   2 large bore IVs    Trend hgb   Active Type and screen   Advance diet per GI  D5W as NPO and getting hypernatremic  PPI   SCD for DVT prophylaxis   Volume resuscitation    Strict IO   Monitor and replete electrolytes   Hemodynamic monitoring   Transfer out of ICU

## 2023-02-11 NOTE — PHYSICAL THERAPY INITIAL EVALUATION ADULT - PERTINENT HX OF CURRENT PROBLEM, REHAB EVAL
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 Patient states that he has been chronic abdominal pain but since yesterday he has been having constipation with dark colored bowel movements. patient has not been able to eat over the last few months. His diet has consisted of mostly ensure. Patient underwent ERCP on 4/22 and was found to have extensive choledocholithiasis and biliary purulence consistent with cholangitis and occluded biliary stent. Stent removed and replaced with new 10Fr/7cm plastic biliary stent. Cholangioscopy was not performed given active cholangitis. Patient considered poor surgical candidate for cholecystectomy. He was d/c with repeat ERCP in 2-3 months for stent removal and possible cholangioscopy. Patient denies any vomiting or any other symptoms including fevers, chills, headaches dizziness, chest pain, SOB, cough, hematuria, dysuria.

## 2023-02-11 NOTE — PHYSICAL THERAPY INITIAL EVALUATION ADULT - GENERAL OBSERVATIONS, REHAB EVAL
Patient was seen for PT evaluation today. EMR, laboratory and radiology results reviewed. Pt received supine in ICU bed, NAD, IV  and Foleys in situ

## 2023-02-11 NOTE — PROGRESS NOTE ADULT - SUBJECTIVE AND OBJECTIVE BOX
INTERVAL HPI/OVERNIGHT EVENTS: No acute events noted overnight.     PRESSORS: [ ] YES [ ] NO  WHICH:    ANTIBIOTICS:                  DATE STARTED:  ANTIBIOTICS:                  DATE STARTED:  ANTIBIOTICS:                  DATE STARTED:    Antimicrobial:  cefTRIAXone   IVPB 2000 milliGRAM(s) IV Intermittent every 24 hours    Cardiovascular:    Pulmonary:    Hematalogic:    Other:  chlorhexidine 2% Cloths 1 Application(s) Topical <User Schedule>  dextrose 5%. 1000 milliLiter(s) IV Continuous <Continuous>  indomethacin Suppository 100 milliGRAM(s) Rectal once  levothyroxine Injectable 20 MICROGram(s) IV Push at bedtime  mupirocin 2% Ointment 1 Application(s) Topical two times a day  ondansetron Injectable 4 milliGRAM(s) IV Push once PRN  pantoprazole Infusion 8 mG/Hr IV Continuous <Continuous>      Drug Dosing Weight  Height (cm): 177.8 (07 Feb 2023 11:14)  Weight (kg): 67.4 (10 Feb 2023 11:39)  BMI (kg/m2): 21.3 (10 Feb 2023 11:39)  BSA (m2): 1.84 (10 Feb 2023 11:39)    CENTRAL LINE: [ ] YES [ ] NO  LOCATION:   DATE INSERTED:  REMOVE: [ ] YES [ ] NO  EXPLAIN:    REYNOLDS: [ ] YES [ ] NO    DATE INSERTED:  REMOVE:  [ ] YES [ ] NO  EXPLAIN:    A-LINE:  [ ] YES [ ] NO  LOCATION:   DATE INSERTED:  REMOVE:  [ ] YES [ ] NO  EXPLAIN:    PMH/Social Hx/Fam Hx -reviewed admission note, no change since admission  PAST MEDICAL & SURGICAL HISTORY:  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      Liver cirrhosis  alcoholic/WALL cirrhosis c/b variceal bleed s/p banding (8/2018) and hepatic encephalopathy (several years ago)      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)      Pancreatitis  2/2 choledocholithiasis s/p ERCP with stone extraction and plastic stent placement (11/2019, stent now removed)      Hematochezia  2/2 colonic AVMs s/p cauterization and hemorrhoids (11/2019)      Melena  2/2 duodenal ulcers s/p argon plasma coagulation (4/2020)      Chronic kidney disease (CKD)      H/O inguinal hernia repair      History of repair of hip fracture  R hip      History of hip replacement  10/2020        T(C): 36.3 (02-10-23 @ 23:15), Max: 37.1 (02-10-23 @ 11:00)  HR: 62 (02-10-23 @ 22:00)  BP: 132/78 (02-10-23 @ 22:00)  BP(mean): 90 (02-10-23 @ 22:00)  ABP: --  ABP(mean): --  RR: 13 (02-10-23 @ 22:00)  SpO2: 99% (02-10-23 @ 22:00)  Wt(kg): --          02-09 @ 07:01  -  02-10 @ 07:00  --------------------------------------------------------  IN: 410 mL / OUT: 1500 mL / NET: -1090 mL            PHYSICAL EXAM:  GENERAL: NAD  EYES: EOMI, PERRL  NECK: Supple, No JVD; Normal thyroid; Trachea midline.   NERVOUS SYSTEM:  Alert & Oriented X3, no drift in upper or lower extremities  CHEST/LUNG: No rales, rhonchi, wheezing, breath sounds present bilaterally  HEART: Regular rate and rhythm; No murmurs, no gallops  ABDOMEN: Soft, nondistended with LLQ tenderness/ Bowel sounds present.  : External urinary catheter in place; voiding well.  EXTREMITIES:  2+ Peripheral Pulses, No clubbing, cyanosis, or edema  SKIN: erythematous and tender patch of skin noted on proximal left forearm. Left gluteal stage II pressure injury and skin tears.    LABS:  CBC Full  -  ( 10 Feb 2023 11:50 )  WBC Count : 5.79 K/uL  RBC Count : 2.96 M/uL  Hemoglobin : 8.5 g/dL  Hematocrit : 26.7 %  Platelet Count - Automated : 31 K/uL  Mean Cell Volume : 90.2 fl  Mean Cell Hemoglobin : 28.7 pg  Mean Cell Hemoglobin Concentration : 31.8 gm/dL  Auto Neutrophil # : x  Auto Lymphocyte # : x  Auto Monocyte # : x  Auto Eosinophil # : x  Auto Basophil # : x  Auto Neutrophil % : x  Auto Lymphocyte % : x  Auto Monocyte % : x  Auto Eosinophil % : x  Auto Basophil % : x    02-10    152<H>  |  124<H>  |  36<H>  ----------------------------<  124<H>  3.6   |  23  |  1.14    Ca    8.2<L>      10 Feb 2023 08:00  Phos  2.0     02-10  Mg     1.8     02-10    TPro  4.5<L>  /  Alb  2.0<L>  /  TBili  0.6  /  DBili  x   /  AST  21  /  ALT  17  /  AlkPhos  66  02-09    PT/INR - ( 10 Feb 2023 08:00 )   PT: 18.7 sec;   INR: 1.56 ratio         PTT - ( 10 Feb 2023 08:00 )  PTT:22.5 sec        RADIOLOGY & ADDITIONAL STUDIES REVIEWED:      [ ]GOALS OF CARE DISCUSSION WITH PATIENT/FAMILY/PROXY:    CRITICAL CARE TIME SPENT: 35 minutes INTERVAL HPI/OVERNIGHT EVENTS: No acute events noted overnight.     PRESSORS: [ ] YES [X ] NO  WHICH:    ANTIBIOTICS: Ceftriaxone       DATE STARTED: 2/8      Antimicrobial:  cefTRIAXone   IVPB 2000 milliGRAM(s) IV Intermittent every 24 hours    Cardiovascular:    Pulmonary:    Hematalogic:    Other:  chlorhexidine 2% Cloths 1 Application(s) Topical <User Schedule>  dextrose 5%. 1000 milliLiter(s) IV Continuous <Continuous>  indomethacin Suppository 100 milliGRAM(s) Rectal once  levothyroxine Injectable 20 MICROGram(s) IV Push at bedtime  mupirocin 2% Ointment 1 Application(s) Topical two times a day  ondansetron Injectable 4 milliGRAM(s) IV Push once PRN  pantoprazole Infusion 8 mG/Hr IV Continuous <Continuous>      Drug Dosing Weight  Height (cm): 177.8 (07 Feb 2023 11:14)  Weight (kg): 67.4 (10 Feb 2023 11:39)  BMI (kg/m2): 21.3 (10 Feb 2023 11:39)  BSA (m2): 1.84 (10 Feb 2023 11:39)    CENTRAL LINE: [ ] YES [ X] NO  LOCATION:   DATE INSERTED:  REYNOLDS: [ ] YES [X] NO    DATE INSERTED:  A-LINE:  [ ] YES [X] NO  LOCATION:   DATE INSERTED:    PMH/Social Hx/[facility] Hx -reviewed admission note, no change since admission  PAST MEDICAL & SURGICAL HISTORY:  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      Liver cirrhosis  alcoholic/WALL cirrhosis c/b variceal bleed s/p banding (8/2018) and hepatic encephalopathy (several years ago)      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)      Pancreatitis  2/2 choledocholithiasis s/p ERCP with stone extraction and plastic stent placement (11/2019, stent now removed)      Hematochezia  2/2 colonic AVMs s/p cauterization and hemorrhoids (11/2019)      Melena  2/2 duodenal ulcers s/p argon plasma coagulation (4/2020)      Chronic kidney disease (CKD)      H/O inguinal hernia repair      History of repair of hip fracture  R hip      History of hip replacement  10/2020        T(C): 36.3 (02-10-23 @ 23:15), Max: 37.1 (02-10-23 @ 11:00)  HR: 62 (02-10-23 @ 22:00)  BP: 132/78 (02-10-23 @ 22:00)  BP(mean): 90 (02-10-23 @ 22:00)  ABP: --  ABP(mean): --  RR: 13 (02-10-23 @ 22:00)  SpO2: 99% (02-10-23 @ 22:00)  Wt(kg): --          02-09 @ 07:01  -  02-10 @ 07:00  --------------------------------------------------------  IN: 410 mL / OUT: 1500 mL / NET: -1090 mL            PHYSICAL EXAM:  GENERAL: NAD  EYES: EOMI, PERRL  NECK: Supple, No JVD; Normal thyroid; Trachea midline.   NERVOUS SYSTEM:  Alert & Oriented X3-4 (disoriented to month at times), no drift in upper or lower extremities  CHEST/LUNG: No rales, rhonchi, wheezing, breath sounds present bilaterally  HEART: Regular rate and rhythm; No murmurs, no gallops  ABDOMEN: Soft, nondistended, non-tender. Bowel sounds present. No melena/dark stools.  : External urinary catheter in place; voiding well.  EXTREMITIES:  2+ Peripheral Pulses, No clubbing, cyanosis, or edema  SKIN: erythematous and tender patch of skin noted on proximal left forearm. Left gluteal stage II pressure injury and skin tears.    LABS:  CBC Full  -  ( 10 Feb 2023 11:50 )  WBC Count : 5.79 K/uL  RBC Count : 2.96 M/uL  Hemoglobin : 8.5 g/dL  Hematocrit : 26.7 %  Platelet Count - Automated : 31 K/uL  Mean Cell Volume : 90.2 fl  Mean Cell Hemoglobin : 28.7 pg  Mean Cell Hemoglobin Concentration : 31.8 gm/dL  Auto Neutrophil # : x  Auto Lymphocyte # : x  Auto Monocyte # : x  Auto Eosinophil # : x  Auto Basophil # : x  Auto Neutrophil % : x  Auto Lymphocyte % : x  Auto Monocyte % : x  Auto Eosinophil % : x  Auto Basophil % : x    02-10    152<H>  |  124<H>  |  36<H>  ----------------------------<  124<H>  3.6   |  23  |  1.14    Ca    8.2<L>      10 Feb 2023 08:00  Phos  2.0     02-10  Mg     1.8     02-10    TPro  4.5<L>  /  Alb  2.0<L>  /  TBili  0.6  /  DBili  x   /  AST  21  /  ALT  17  /  AlkPhos  66  02-09    PT/INR - ( 10 Feb 2023 08:00 )   PT: 18.7 sec;   INR: 1.56 ratio         PTT - ( 10 Feb 2023 08:00 )  PTT:22.5 sec        RADIOLOGY & ADDITIONAL STUDIES REVIEWED:      [ ]GOALS OF CARE DISCUSSION WITH PATIENT/FAMILY/PROXY:    CRITICAL CARE TIME SPENT: 35 minutes INTERVAL HPI/OVERNIGHT EVENTS: No acute events noted overnight.     PRESSORS: [ ] YES [X ] NO  WHICH:    ANTIBIOTICS: Ceftriaxone       DATE STARTED: 2/8      Antimicrobial:  cefTRIAXone   IVPB 2000 milliGRAM(s) IV Intermittent every 24 hours    Cardiovascular:    Pulmonary:    Hematalogic:    Other:  chlorhexidine 2% Cloths 1 Application(s) Topical <User Schedule>  dextrose 5%. 1000 milliLiter(s) IV Continuous <Continuous>  indomethacin Suppository 100 milliGRAM(s) Rectal once  levothyroxine Injectable 20 MICROGram(s) IV Push at bedtime  mupirocin 2% Ointment 1 Application(s) Topical two times a day  ondansetron Injectable 4 milliGRAM(s) IV Push once PRN  pantoprazole Infusion 8 mG/Hr IV Continuous <Continuous>      Drug Dosing Weight  Height (cm): 177.8 (07 Feb 2023 11:14)  Weight (kg): 67.4 (10 Feb 2023 11:39)  BMI (kg/m2): 21.3 (10 Feb 2023 11:39)  BSA (m2): 1.84 (10 Feb 2023 11:39)    CENTRAL LINE: [ ] YES [ X] NO  LOCATION:   DATE INSERTED:  REYNOLDS: [ ] YES [X] NO    DATE INSERTED:  A-LINE:  [ ] YES [X] NO  LOCATION:   DATE INSERTED:    PMH/Social Hx/[facility] Hx -reviewed admission note, no change since admission  PAST MEDICAL & SURGICAL HISTORY:  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      Liver cirrhosis  alcoholic/WALL cirrhosis c/b variceal bleed s/p banding (8/2018) and hepatic encephalopathy (several years ago)      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)      Pancreatitis  2/2 choledocholithiasis s/p ERCP with stone extraction and plastic stent placement (11/2019, stent now removed)      Hematochezia  2/2 colonic AVMs s/p cauterization and hemorrhoids (11/2019)      Melena  2/2 duodenal ulcers s/p argon plasma coagulation (4/2020)      Chronic kidney disease (CKD)      H/O inguinal hernia repair      History of repair of hip fracture  R hip      History of hip replacement  10/2020        T(C): 36.3 (02-10-23 @ 23:15), Max: 37.1 (02-10-23 @ 11:00)  HR: 62 (02-10-23 @ 22:00)  BP: 132/78 (02-10-23 @ 22:00)  BP(mean): 90 (02-10-23 @ 22:00)  ABP: --  ABP(mean): --  RR: 13 (02-10-23 @ 22:00)  SpO2: 99% (02-10-23 @ 22:00)  Wt(kg): --          02-09 @ 07:01  -  02-10 @ 07:00  --------------------------------------------------------  IN: 410 mL / OUT: 1500 mL / NET: -1090 mL            PHYSICAL EXAM:  GENERAL: NAD  EYES: EOMI, PERRL  NECK: Supple, No JVD; Normal thyroid; Trachea midline.   NERVOUS SYSTEM:  Alert & Oriented X3-4 (disoriented to month at times), no drift in upper or lower extremities  CHEST/LUNG: No rales, rhonchi, wheezing, breath sounds present bilaterally  HEART: Regular rate and rhythm; No murmurs, no gallops  ABDOMEN: Soft, nondistended, non-tender. Bowel sounds present. No melena/dark stools.  : External urinary catheter in place; voiding well.  EXTREMITIES:  2+ Peripheral Pulses, No clubbing, cyanosis, or edema  SKIN: erythematous and tender patch of skin noted on proximal left forearm. Left gluteal stage II pressure injury and skin tears.    LABS:  CBC Full  -  ( 10 Feb 2023 11:50 )  WBC Count : 5.79 K/uL  RBC Count : 2.96 M/uL  Hemoglobin : 8.5 g/dL  Hematocrit : 26.7 %  Platelet Count - Automated : 31 K/uL  Mean Cell Volume : 90.2 fl  Mean Cell Hemoglobin : 28.7 pg  Mean Cell Hemoglobin Concentration : 31.8 gm/dL  Auto Neutrophil # : x  Auto Lymphocyte # : x  Auto Monocyte # : x  Auto Eosinophil # : x  Auto Basophil # : x  Auto Neutrophil % : x  Auto Lymphocyte % : x  Auto Monocyte % : x  Auto Eosinophil % : x  Auto Basophil % : x    02-10    152<H>  |  124<H>  |  36<H>  ----------------------------<  124<H>  3.6   |  23  |  1.14    Ca    8.2<L>      10 Feb 2023 08:00  Phos  2.0     02-10  Mg     1.8     02-10    TPro  4.5<L>  /  Alb  2.0<L>  /  TBili  0.6  /  DBili  x   /  AST  21  /  ALT  17  /  AlkPhos  66  02-09    PT/INR - ( 10 Feb 2023 08:00 )   PT: 18.7 sec;   INR: 1.56 ratio         PTT - ( 10 Feb 2023 08:00 )  PTT:22.5 sec        RADIOLOGY & ADDITIONAL STUDIES REVIEWED:      [X]GOALS OF CARE DISCUSSION WITH PATIENT/FAMILY/PROXY: Updated provided over phone to spouse Shannon Florez.    CRITICAL CARE TIME SPENT: 35 minutes

## 2023-02-11 NOTE — PHYSICAL THERAPY INITIAL EVALUATION ADULT - PREDICTED DURATION OF THERAPY (DAYS/WKS), PT EVAL
35 y/o female with no relevant PMHx presents to the ED s/p syncope today. Pt states she has had intermittent dizziness x1 week and today she was in her bathroom, felt dizzy and syncopized. +LOC. At time of exam, pt c/o lightheadedness, no dizziness. No fever, CP or any other acute complaints at this time. Pt states she is staying hydrated. 4 weeks

## 2023-02-11 NOTE — PROGRESS NOTE ADULT - SUBJECTIVE AND OBJECTIVE BOX
Patient is a 82y old  Male who presents with a chief complaint of abdominal pain (09 Feb 2023 10:56)      SUBJECTIVE / OVERNIGHT EVENTS: Transferred to ICU   s/p Hypotension GI Bleed   s/p EGD Gastritis     T(C): 36.6 (02-12-23 @ 20:00), Max: 36.6 (02-12-23 @ 20:00)  HR: 90 (02-12-23 @ 20:00) (65 - 98)  BP: 137/77 (02-12-23 @ 20:00) (115/70 - 137/77)  RR: 16 (02-12-23 @ 20:00) (16 - 21)  SpO2: 98% (02-12-23 @ 20:00) (98% - 100%)      MEDICATIONS  (STANDING):  cefTRIAXone   IVPB 2000 milliGRAM(s) IV Intermittent every 24 hours  chlorhexidine 2% Cloths 1 Application(s) Topical <User Schedule>  indomethacin Suppository 100 milliGRAM(s) Rectal once  levothyroxine 25 MICROGram(s) Oral daily  mupirocin 2% Ointment 1 Application(s) Topical two times a day  pantoprazole  Injectable 40 milliGRAM(s) IV Push two times a day  polyethylene glycol 3350 17 Gram(s) Oral every 12 hours  senna 2 Tablet(s) Oral at bedtime    MEDICATIONS  (PRN):  acetaminophen     Tablet .. 650 milliGRAM(s) Oral every 6 hours PRN Temp greater or equal to 38C (100.4F), Mild Pain (1 - 3)  ondansetron Injectable 4 milliGRAM(s) IV Push once PRN Nausea and/or Vomiting    PHYSICAL EXAM:  GENERAL: NAD, well-developed  HEAD:  Atraumatic, Normocephalic  EYES: EOMI, PERRLA, conjunctiva and sclera clear  NECK: Supple, No JVD  CHEST/LUNG: Clear to auscultation bilaterally; No wheeze  HEART: Regular rate and rhythm; No murmurs, rubs, or gallops  ABDOMEN: Soft, Nontender, Nondistended; Bowel sounds present  EXTREMITIES:  2+ Peripheral Pulses, No clubbing, cyanosis, or edema  PSYCH: AAOx3  NEUROLOGY: non-focal  SKIN: No rashes or lesions                              8.5    5.79  )-----------( 31       ( 10 Feb 2023 11:50 )             26.7           LIVER FUNCTIONS - ( 09 Feb 2023 05:29 )  Alb: 2.0 g/dL / Pro: 4.5 g/dL / ALK PHOS: 66 U/L / ALT: 17 U/L DA / AST: 21 U/L / GGT: x           PT/INR - ( 10 Feb 2023 08:00 )   PT: 18.7 sec;   INR: 1.56 ratio         PTT - ( 10 Feb 2023 08:00 )  PTT:22.5 sec  152|124|36<124  3.6|23|1.14  8.2,1.8,2.0  02-10 @ 08:00            RADIOLOGY & ADDITIONAL TESTS:    Imaging Personally Reviewed:    Consultant(s) Notes Reviewed:      Care Discussed with Consultants/Other Providers:

## 2023-02-11 NOTE — PHYSICAL THERAPY INITIAL EVALUATION ADULT - ADDITIONAL COMMENTS
Patient lives in a private house with steps to navigate (24steps). Patient uses RW to ambulate in home and community. Patient needs assistance in all ADL's. Patient reports coming from rehab center then discharged to home prior to hospital admission.

## 2023-02-11 NOTE — PHYSICAL THERAPY INITIAL EVALUATION ADULT - DIAGNOSIS, PT EVAL
Patient with decrease functional mobility, generalized weakness, decrease OOB mobility with recent hospitalization and GI bleeding

## 2023-02-11 NOTE — PHYSICAL THERAPY INITIAL EVALUATION ADULT - FOLLOWS COMMANDS/ANSWERS QUESTIONS, REHAB EVAL
pt prefers to speak english to triage rn/No-Patient/Caregiver offered and refused free interpretation services. 100% of the time

## 2023-02-11 NOTE — PROGRESS NOTE ADULT - ASSESSMENT
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     Active Issues:  #Hemorrhagic shock (resolving)  #Liver cirrhosis   #Porcelain gallbladder   #Non-oliguric acute kidney injury   #Hyperkalemia   #Hypernatremia  #Hypothyroidism  #Cellulitis (right forearm)    Plan:   _________CNS___________  No active issues  AAOx, not encephalopathic     _________CVS___________  #Hemorrhagic shock  Hypotensive secondary to GI hemorrhage during admission  Resolving with PRBC transfusions.   Holding home antihypertensives.   Hypotensive overnight and given 1 PRBC.    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  Continue pantoprazole infusion  Maintain NPO      #Biliary Stent  Old biliary stent noted on EGD to not be draining, removed during EDG.   Tentative ERCP on 2/10 with Dr Nimco Lemus     ________ RENAL__________  #KAY (Improving)  Baseline normal creatinine   Likely due to hypotension and anemia       #Hyperkalemia   Hold spironolactone   Holding diuretics in setting of symptomatic anemia    #Hypernatremia  In setting of prolonged NPO period  Start D5W at 60ml/hour    __________ID____________  #SBP  Empiric coverage with Ceftriaxone.   Afebrile and WBCs normal.   Monitor and send cultures if temperature spikes.     #MRSA colonization  - Started Bactroban for positive MRSA nasal swab    _________ENDO__________  #Hypothyroidism  - Converted oral levothyroxine (home dose of 0.25 mcg) to IV     _______HEME/ONC_______  #As above   - Vit K given per GI for elevated INR prior to MRCP    _________SKIN____________  #Cellulitis  - Noted to have erythematous, tender patch on proximal left forearm  - Patient on ceftriaxone, appropriate coverage  - Monitor for resolution/exacerbation    ________Prophylaxis_______  #Hold AC for bleeding     __________GOC/ DISPO___________  FULL CODE   82 year old male w/ PMH alcoholic liver cirrhosis, GI bleed, 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     Active Issues:  #Hemorrhagic shock (resolving)  #Liver cirrhosis   #Porcelain gallbladder   #Non-oliguric acute kidney injury   #Hyperkalemia   #Hypernatremia  #Hypothyroidism  #Cellulitis (right forearm)    Plan:   _________CNS___________  No active issues  AAOx3-4, not encephalopathic     _________CVS___________  #Hemorrhagic shock  Hypotensive secondary to GI hemorrhage during admission  Resolving with PRBC transfusions (3 total PRBC this admission).  Holding home antihypertensives.   Hemodynamically stable.    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  Advanced to clear liquid and tolerating; follow up with GI about advancing to solid foods  Pantoprazole infusion switched to IVP q12h      #Biliary Stent  Old biliary stent noted on EGD to not be draining, removed during EDG.   ERCP on 2/10 cancelled due to thromboctyopenia   ERCP tentatively scheduled for 2/13 with Dr Rinaldi; indomethacin ordered on call to endo by GI  GI DR. Lemus     ________ RENAL__________  #KAY (Improving)  Baseline normal creatinine/BUN (1.12/11)  Likely due to hypotension and anemia   Back to baseline creatinine; BUN slightly elevated    #Hyperkalemia (resolved)  Hold spironolactone   Holding diuretics in setting of symptomatic anemia    #Hypernatremia (resolved)  In setting of prolonged NPO period  D5W discontinued  Oral diet resumed    __________ID____________  #SBP  Empiric coverage with Ceftriaxone.   Afebrile and WBCs normal.   Monitor and send cultures if temperature spikes.     #MRSA colonization  - Continue Bactroban for positive MRSA nasal swab for total of 5 days    _________ENDO__________  #Hypothyroidism  - No longer NPO; resume 25mg oral synthroid     _______HEME/ONC_______  #As above   - Vit K given per GI for elevated INR on 2/10  - Check INR with AM labs on 2/13 pre-ERCP    _________SKIN____________  #Cellulitis  - Noted to have erythematous, tender patch on proximal left forearm  - Patient on ceftriaxone, appropriate coverage  - Monitor for resolution/exacerbation    ________Prophylaxis_______  #Hold AC for bleeding     __________GOC/ DISPO___________  FULL CODE  Stable for downgrade to medicine floor   82 year old male w/ PMH alcoholic liver cirrhosis, GI bleed, 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     Active Issues:  #Hemorrhagic shock (resolving)  #Liver cirrhosis   #Porcelain gallbladder   #Non-oliguric acute kidney injury   #Hyperkalemia   #Hypernatremia  #Hypothyroidism  #Cellulitis (right forearm)    Plan:   _________CNS___________  No active issues  AAOx3-4, not encephalopathic     _________CVS___________  #Hemorrhagic shock  Hypotensive secondary to GI hemorrhage during admission  Resolved with PRBC transfusions (3 total PRBC this admission).  Hemodynamically stable.  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  Advanced to clear liquid and tolerating; follow up with GI about advancing to solid foods  Pantoprazole infusion switched to IVP q12h      #Biliary Stent  Old biliary stent noted on EGD to not be draining, removed during EDG.   ERCP on 2/10 cancelled due to thromboctyopenia   ERCP tentatively scheduled for 2/13 with Dr Rinaldi; indomethacin ordered on call to endo by GI  GI DR. Lemus     ________ RENAL__________  #KAY (Improving)  Baseline normal creatinine/BUN (1.12/11)  Likely due to hypotension and anemia   Back to baseline creatinine; BUN slightly elevated    #Hyperkalemia (resolved)  Hold spironolactone   Holding diuretics in setting of symptomatic anemia    #Hypernatremia (resolved)  In setting of prolonged NPO period  D5W discontinued  Oral diet resumed    __________ID____________  #SBP  Empiric coverage with Ceftriaxone (2/8-2/15)  Afebrile and WBCs normal.   Monitor and send cultures if temperature spikes.     #MRSA colonization  - Continue Bactroban for positive MRSA nasal swab for total of 5 days (2/9-2/14)    _________ENDO__________  #Hypothyroidism  - No longer NPO; resume 25mg oral synthroid     _______HEME/ONC_______  #As above   - Vit K given per GI for elevated INR on 2/10  - Check INR with AM labs on 2/13 pre-ERCP    _________SKIN____________  #Cellulitis  - Noted to have erythematous, tender patch on proximal left forearm  - Patient on ceftriaxone, appropriate coverage  - Monitor for resolution/exacerbation    ________Prophylaxis_______  #Hold AC for bleeding     __________GOC/ DISPO___________  FULL CODE  Stable for downgrade to medicine floor   82 year old male w/ PMH alcoholic liver cirrhosis, GI bleed, 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     Active Issues:  #Hemorrhagic shock (resolving)  #Liver cirrhosis   #Porcelain gallbladder   #Non-oliguric acute kidney injury   #Hyperkalemia   #Hypernatremia  #Hypothyroidism  #Cellulitis (right forearm)    Plan:   _________CNS___________  No active issues  AAOx3-4, not encephalopathic     _________CVS___________  #Hemorrhagic shock (resolved)  Hypotensive secondary to GI hemorrhage during admission  Resolved with PRBC transfusions (3 total PRBC this admission).  Hemodynamically stable.    #Hypertension  Held due to bleeding.  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  Advanced to regular diet per GI.   Pantoprazole infusion switched to IVP q12h    #Biliary Stent  Old biliary stent noted on EGD to not be draining, removed during EDG.   ERCP on 2/10 cancelled due to thromboctyopenia (lab error?)  ERCP tentatively scheduled for 2/13 with Dr Rinaldi; indomethacin ordered on call to endo by GI  GI DR. Lemus     ________ RENAL__________  #KAY (Improving)  Baseline normal creatinine/BUN (1.12/11)  Likely due to hypotension and anemia   Back to baseline creatinine; BUN slightly elevated    #Hyperkalemia (resolved)  Hold spironolactone   Holding diuretics in setting of symptomatic anemia    #Hypernatremia (resolved)  In setting of prolonged NPO period  D5W discontinued  Oral diet resumed    __________ID____________  #SBP  Empiric coverage with Ceftriaxone (2/8-2/15)  Afebrile and WBCs normal.   Monitor and send cultures if temperature spikes.     #MRSA colonization  Continue Bactroban for positive MRSA nasal swab for total of 5 days (2/9-2/14)    _________ENDO__________  #Hypothyroidism  - 25mg oral synthroid     _______HEME/ONC_______  #Thrombocytopenia  - Continue to monitor platelet count.     _________SKIN____________  #Cellulitis  - Noted to have erythematous, tender patch on proximal left forearm  - Patient on ceftriaxone, appropriate coverage  - Monitor for resolution/exacerbation    ________Prophylaxis_______  #Hold AC for bleeding     __________GOC/DISPO___________  FULL CODE  Stable for downgrade to medicine floor

## 2023-02-12 NOTE — PROGRESS NOTE ADULT - ASSESSMENT
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     Active Issues:  #Hemorrhagic shock (resolving)  #Liver cirrhosis   #Porcelain gallbladder   #Non-oliguric acute kidney injury   #Hyperkalemia   #Hypernatremia  #Hypothyroidism  #Cellulitis (right forearm)    Plan:   _________CNS___________  No active issues  AAOx3-4, not encephalopathic     _________CVS___________  #Hemorrhagic shock (resolved)  Hypotensive secondary to GI hemorrhage during admission  Resolved with PRBC transfusions (3 total PRBC this admission).  Hemodynamically stable.    #Hypertension  Held due to bleeding.  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  Advanced to regular diet per GI.   Pantoprazole infusion switched to IVP q12h    #Biliary Stent  Old biliary stent noted on EGD to not be draining, removed during EDG.   ERCP on 2/10 cancelled due to thromboctyopenia (lab error?)  ERCP tentatively scheduled for 2/13 with Dr Rinaldi; indomethacin ordered on call to endo by GI  GI DR. Lemus     ________ RENAL__________  #KAY (Improving)  Baseline normal creatinine/BUN (1.12/11)  Likely due to hypotension and anemia   Back to baseline creatinine; BUN slightly elevated    #Hyperkalemia (resolved)  Hold spironolactone   Holding diuretics in setting of symptomatic anemia    #Hypernatremia (resolved)  In setting of prolonged NPO period  D5W discontinued  Oral diet resumed    __________ID____________  #SBP  Empiric coverage with Ceftriaxone (2/8-2/15)  Afebrile and WBCs normal.   Monitor and send cultures if temperature spikes.     #MRSA colonization  Continue Bactroban for positive MRSA nasal swab for total of 5 days (2/9-2/14)    _________ENDO__________  #Hypothyroidism  - 25mg oral synthroid     _______HEME/ONC_______  #Thrombocytopenia  - Continue to monitor platelet count.     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     Active Issues:  #Hemorrhagic shock (resolving)  #Liver cirrhosis   #Porcelain gallbladder   #Non-oliguric acute kidney injury   #Hyperkalemia   #Hypernatremia  #Hypothyroidism  #Cellulitis (right forearm)    Plan:   _________CNS___________  No active issues  AAOx3-4, not encephalopathic     _________CVS___________  #Hemorrhagic shock (resolved)  Hypotensive secondary to GI hemorrhage during admission  Resolved with PRBC transfusions (3 total PRBC this admission).  Hemodynamically stable.    #Hypertension  Held due to bleeding.  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  Advanced to regular diet per GI.   Pantoprazole infusion switched to IVP q12h    #Biliary Stent  Old biliary stent noted on EGD to not be draining, removed during EDG.   ERCP on 2/10 cancelled due to thromboctyopenia (lab error?)  ERCP tentatively scheduled for 2/13 with Dr Rinaldi; indomethacin ordered on call to endo by GI  GI DR. Lemus     ________ RENAL__________  #KAY (Improving)  Baseline normal creatinine/BUN (1.12/11)  Likely due to hypotension and anemia   Back to baseline creatinine; BUN slightly elevated    #Hyperkalemia (resolved)  Hold spironolactone   Holding diuretics in setting of symptomatic anemia    #Hypernatremia (resolved)  In setting of prolonged NPO period  D5W discontinued  Oral diet resumed    __________ID____________  #SBP  Empiric coverage with Ceftriaxone (2/8-2/15)  Afebrile and WBCs normal.   Monitor and send cultures if temperature spikes.     #MRSA colonization  Continue Bactroban for positive MRSA nasal swab for total of 5 days (2/9-2/14)    _________ENDO__________  #Hypothyroidism  - 25mg oral synthroid     _______HEME/ONC_______  #Thrombocytopenia  - Continue to monitor platelet count.     _________SKIN____________  #Cellulitis  - Noted to have erythematous, tender patch on proximal left forearm  - Patient on ceftriaxone, appropriate coverage  - Monitor for resolution/exacerbation    ________Prophylaxis_______  #Hold AC for bleeding     __________GOC/DISPO___________  FULL CODE  Transfer to medicine.

## 2023-02-12 NOTE — PROGRESS NOTE ADULT - SUBJECTIVE AND OBJECTIVE BOX
INTERVAL HPI/OVERNIGHT EVENTS: ***    PRESSORS: [ ] YES [ ] NO    Antimicrobial:  cefTRIAXone   IVPB 2000 milliGRAM(s) IV Intermittent every 24 hours    Cardiovascular:    Pulmonary:    Hematalogic:    Other:  chlorhexidine 2% Cloths 1 Application(s) Topical <User Schedule>  indomethacin Suppository 100 milliGRAM(s) Rectal once  levothyroxine 25 MICROGram(s) Oral daily  mupirocin 2% Ointment 1 Application(s) Topical two times a day  ondansetron Injectable 4 milliGRAM(s) IV Push once PRN  pantoprazole  Injectable 40 milliGRAM(s) IV Push two times a day    cefTRIAXone   IVPB 2000 milliGRAM(s) IV Intermittent every 24 hours  chlorhexidine 2% Cloths 1 Application(s) Topical <User Schedule>  indomethacin Suppository 100 milliGRAM(s) Rectal once  levothyroxine 25 MICROGram(s) Oral daily  mupirocin 2% Ointment 1 Application(s) Topical two times a day  ondansetron Injectable 4 milliGRAM(s) IV Push once PRN  pantoprazole  Injectable 40 milliGRAM(s) IV Push two times a day    Drug Dosing Weight  Height (cm): 177.8 (07 Feb 2023 11:14)  Weight (kg): 67.4 (10 Feb 2023 11:39)  BMI (kg/m2): 21.3 (10 Feb 2023 11:39)  BSA (m2): 1.84 (10 Feb 2023 11:39)    CENTRAL LINE: [ ] YES [ ] NO  LOCATION:   DATE INSERTED:  REMOVE: [ ] YES [ ] NO  EXPLAIN:    REYNOLDS: [ ] YES [ ] NO    DATE INSERTED:  REMOVE:  [ ] YES [ ] NO  EXPLAIN:    A-LINE:  [ ] YES [ ] NO  LOCATION:   DATE INSERTED:  REMOVE:  [ ] YES [ ] NO  EXPLAIN:    [facility] -reviewed admission note, no change since admission            02-10 @ 07:01 - 02-11 @ 07:00  --------------------------------------------------------  IN: 2689.8 mL / OUT: 1250 mL / NET: 1439.8 mL            PHYSICAL EXAM:    GENERAL: NAD  EYES: EOMI, PERRL  NECK: Supple, No JVD; Normal thyroid; Trachea midline.   NERVOUS SYSTEM:  Alert & Oriented X3-4 (disoriented to month at times), no drift in upper or lower extremities  CHEST/LUNG: No rales, rhonchi, wheezing, breath sounds present bilaterally  HEART: Regular rate and rhythm; No murmurs, no gallops  ABDOMEN: Soft, nondistended, non-tender. Bowel sounds present. No melena/dark stools.  : External urinary catheter in place; voiding well.  EXTREMITIES:  2+ Peripheral Pulses, No clubbing, cyanosis, or edema  SKIN: erythematous and tender patch of skin noted on proximal left forearm. Left gluteal stage II pressure injury and skin tears.        LABS:  CBC Full  -  ( 11 Feb 2023 07:45 )  WBC Count : 7.19 K/uL  RBC Count : 3.08 M/uL  Hemoglobin : 8.7 g/dL  Hematocrit : 27.5 %  Platelet Count - Automated : 106 K/uL  Mean Cell Volume : 89.3 fl  Mean Cell Hemoglobin : 28.2 pg  Mean Cell Hemoglobin Concentration : 31.6 gm/dL  Auto Neutrophil # : x  Auto Lymphocyte # : x  Auto Monocyte # : x  Auto Eosinophil # : x  Auto Basophil # : x  Auto Neutrophil % : x  Auto Lymphocyte % : x  Auto Monocyte % : x  Auto Eosinophil % : x  Auto Basophil % : x    02-11    144  |  115<H>  |  22<H>  ----------------------------<  127<H>  3.7   |  24  |  1.11    Ca    8.2<L>      11 Feb 2023 07:45  Phos  2.2     02-11  Mg     1.7     02-11    TPro  4.9<L>  /  Alb  2.1<L>  /  TBili  0.6  /  DBili  x   /  AST  24  /  ALT  17  /  AlkPhos  76  02-11    PT/INR - ( 10 Feb 2023 08:00 )   PT: 18.7 sec;   INR: 1.56 ratio         PTT - ( 10 Feb 2023 08:00 )  PTT:22.5 sec        RADIOLOGY & ADDITIONAL STUDIES REVIEWED:  ***    [ ]GOALS OF CARE DISCUSSION WITH PATIENT/FAMILY/PROXY:    CRITICAL CARE TIME SPENT: 35 minutes INTERVAL HPI/OVERNIGHT EVENTS: No acute events overnight.     PRESSORS: [ ] YES [ ] NO    Antimicrobial:  cefTRIAXone   IVPB 2000 milliGRAM(s) IV Intermittent every 24 hours    Cardiovascular:    Pulmonary:    Hematalogic:    Other:  chlorhexidine 2% Cloths 1 Application(s) Topical <User Schedule>  indomethacin Suppository 100 milliGRAM(s) Rectal once  levothyroxine 25 MICROGram(s) Oral daily  mupirocin 2% Ointment 1 Application(s) Topical two times a day  ondansetron Injectable 4 milliGRAM(s) IV Push once PRN  pantoprazole  Injectable 40 milliGRAM(s) IV Push two times a day    cefTRIAXone   IVPB 2000 milliGRAM(s) IV Intermittent every 24 hours  chlorhexidine 2% Cloths 1 Application(s) Topical <User Schedule>  indomethacin Suppository 100 milliGRAM(s) Rectal once  levothyroxine 25 MICROGram(s) Oral daily  mupirocin 2% Ointment 1 Application(s) Topical two times a day  ondansetron Injectable 4 milliGRAM(s) IV Push once PRN  pantoprazole  Injectable 40 milliGRAM(s) IV Push two times a day    Drug Dosing Weight  Height (cm): 177.8 (07 Feb 2023 11:14)  Weight (kg): 67.4 (10 Feb 2023 11:39)  BMI (kg/m2): 21.3 (10 Feb 2023 11:39)  BSA (m2): 1.84 (10 Feb 2023 11:39)    CENTRAL LINE: [ ] YES [ ] NO  LOCATION:   DATE INSERTED:  REMOVE: [ ] YES [ ] NO  EXPLAIN:    RODOLFO: [ ] YES [ ] NO    DATE INSERTED:  REMOVE:  [ ] YES [ ] NO  EXPLAIN:    A-LINE:  [ ] YES [ ] NO  LOCATION:   DATE INSERTED:  REMOVE:  [ ] YES [ ] NO  EXPLAIN:    [facility] -reviewed admission note, no change since admission            02-10 @ 07:01  -  02-11 @ 07:00  --------------------------------------------------------  IN: 2689.8 mL / OUT: 1250 mL / NET: 1439.8 mL            PHYSICAL EXAM:    GENERAL: NAD  EYES: EOMI, PERRL  NECK: Supple, No JVD; Normal thyroid; Trachea midline.   NERVOUS SYSTEM:  Alert & Oriented X3-4 (disoriented to month at times), no drift in upper or lower extremities  CHEST/LUNG: No rales, rhonchi, wheezing, breath sounds present bilaterally  HEART: Regular rate and rhythm; No murmurs, no gallops  ABDOMEN: Soft, nondistended, non-tender. Bowel sounds present. No melena/dark stools.  : External urinary catheter in place; voiding well.  EXTREMITIES:  2+ Peripheral Pulses, No clubbing, cyanosis, or edema  SKIN: erythematous and tender patch of skin noted on proximal left forearm. Left gluteal stage II pressure injury and skin tears.        LABS:  CBC Full  -  ( 11 Feb 2023 07:45 )  WBC Count : 7.19 K/uL  RBC Count : 3.08 M/uL  Hemoglobin : 8.7 g/dL  Hematocrit : 27.5 %  Platelet Count - Automated : 106 K/uL  Mean Cell Volume : 89.3 fl  Mean Cell Hemoglobin : 28.2 pg  Mean Cell Hemoglobin Concentration : 31.6 gm/dL  Auto Neutrophil # : x  Auto Lymphocyte # : x  Auto Monocyte # : x  Auto Eosinophil # : x  Auto Basophil # : x  Auto Neutrophil % : x  Auto Lymphocyte % : x  Auto Monocyte % : x  Auto Eosinophil % : x  Auto Basophil % : x    02-11    144  |  115<H>  |  22<H>  ----------------------------<  127<H>  3.7   |  24  |  1.11    Ca    8.2<L>      11 Feb 2023 07:45  Phos  2.2     02-11  Mg     1.7     02-11    TPro  4.9<L>  /  Alb  2.1<L>  /  TBili  0.6  /  DBili  x   /  AST  24  /  ALT  17  /  AlkPhos  76  02-11    PT/INR - ( 10 Feb 2023 08:00 )   PT: 18.7 sec;   INR: 1.56 ratio         PTT - ( 10 Feb 2023 08:00 )  PTT:22.5 sec        RADIOLOGY & ADDITIONAL STUDIES REVIEWED:  ***    [ ]GOALS OF CARE DISCUSSION WITH PATIENT/FAMILY/PROXY:    CRITICAL CARE TIME SPENT: 35 minutes

## 2023-02-12 NOTE — CHART NOTE - NSCHARTNOTEFT_GEN_A_CORE
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 Patient states that he has been chronic abdominal pain but since yesterday he has been having constipation with dark colored bowel movements. patient has not been able to eat over the last few months. His diet has consisted of mostly ensure. Patient underwent ERCP on 4/22 and was found to have extensive choledocholithiasis and biliary purulence consistent with cholangitis and occluded biliary stent. Stent removed and replaced with new 10Fr/7cm plastic biliary stent. Cholangioscopy was not performed given active cholangitis. Repeated Hb dropped to 6.5. BP significantly dropped. ICU was consulted for severe hypotension and symptomatic anemia with suspected GI bleed. In ICU (2/8) he received 3 units of PRBC with improvement in H&H and hemodynamics, endoscopy performed by GI Dr. 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 During this EGD he the biliary stent was noted to be out of place and was removed. Now patient is hemodynamically stable and pending ERCP to replace stent.     For Accepting Team Follow up:  - ERCP planned for 2/13 afternoon.   - NPO after 0000, no AC, am labs ordered.   - Ceftriaxone course from 2/8 - 15 for SBP prophylaxis as well as right arm cellulitis (resolving)  - Re-start home antihypertensives if BP remains high post ERCP and no further episodes of bleeding.   - Seen by PT who recommends sub-acute rehab.    	  Sign out provided to accepting attending Dr. 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 Patient states that he has been chronic abdominal pain but since yesterday he has been having constipation with dark colored bowel movements. patient has not been able to eat over the last few months. His diet has consisted of mostly ensure. Patient underwent ERCP on 4/22 and was found to have extensive choledocholithiasis and biliary purulence consistent with cholangitis and occluded biliary stent. Stent removed and replaced with new 10Fr/7cm plastic biliary stent. Cholangioscopy was not performed given active cholangitis. Repeated Hb dropped to 6.5. BP significantly dropped. ICU was consulted for severe hypotension and symptomatic anemia with suspected GI bleed. In ICU (2/8) he received 3 units of PRBC with improvement in H&H and hemodynamics, endoscopy performed by GI Dr. 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 During this EGD he the biliary stent was noted to be out of place and was removed. Now patient is hemodynamically stable and pending ERCP to replace stent.     For Accepting Team Follow up:  - ERCP planned for 2/13 afternoon.   - NPO after 0000, no AC, am labs ordered.   - Ceftriaxone course from 2/8 - 15 for SBP prophylaxis as well as right arm cellulitis (resolving)  - Re-start home antihypertensives if BP remains high post ERCP and no further episodes of bleeding.   - Seen by PT who recommends sub-acute rehab.    	  Sign out provided to accepting attending Dr. 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 Patient states that he has been chronic abdominal pain but since yesterday he has been having constipation with dark colored bowel movements. patient has not been able to eat over the last few months. His diet has consisted of mostly ensure. Patient underwent ERCP on 4/22 and was found to have extensive choledocholithiasis and biliary purulence consistent with cholangitis and occluded biliary stent. Stent removed and replaced with new 10Fr/7cm plastic biliary stent. Cholangioscopy was not performed given active cholangitis. Repeated Hb dropped to 6.5. BP significantly dropped. ICU was consulted for severe hypotension and symptomatic anemia with suspected GI bleed. In ICU (2/8) he received 3 units of PRBC with improvement in H&H and hemodynamics, endoscopy performed by GI Dr. 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 During this EGD he the biliary stent was noted to be out of place and was removed. Now patient is hemodynamically stable and pending ERCP to replace stent.     For Accepting Team Follow up:  - ERCP planned for 2/13 afternoon.   - NPO after 0000, no AC, am labs ordered.   - Ceftriaxone course from 2/8 - 15 for SBP prophylaxis as well as right arm cellulitis (resolving)  - Re-start home antihypertensives if BP remains high post ERCP and no further episodes of bleeding.   - Seen by PT who recommends sub-acute rehab.    	  Sign out provided to accepting attending Dr. Victor and resident physician Dr. Baldev Sin M.D., Ph.D.

## 2023-02-13 NOTE — PROGRESS NOTE ADULT - SUBJECTIVE AND OBJECTIVE BOX
PGY-1 Progress Note discussed with attending    PAGER #: [[phone]] TILL 5:00 PM  PLEASE CONTACT ON CALL TEAM:  - On Call Team (Please refer to Yonny) FROM 5:00 PM - 8:30PM  - Nightfloat Team FROM 8:30 -7:30 AM    CHIEF COMPLAINT & BRIEF HOSPITAL COURSE:    INTERVAL HPI/OVERNIGHT EVENTS:       REVIEW OF SYSTEMS:  CONSTITUTIONAL: No fever, weight loss, or fatigue  RESPIRATORY: No cough, wheezing, chills or hemoptysis; No shortness of breath  CARDIOVASCULAR: No chest pain, palpitations, dizziness, or leg swelling  GASTROINTESTINAL: No abdominal pain. No nausea, vomiting, or hematemesis; No diarrhea or constipation. No melena or hematochezia.  GENITOURINARY: No dysuria or hematuria, urinary frequency  NEUROLOGICAL: No headaches, memory loss, loss of strength, numbness, or tremors  SKIN: No itching, burning, rashes, or lesions     Vital Signs Last 24 Hrs  T(C): 36.7 (13 Feb 2023 14:06), Max: 37.2 (13 Feb 2023 04:32)  T(F): 98.1 (13 Feb 2023 14:06), Max: 99 (13 Feb 2023 04:32)  HR: 72 (13 Feb 2023 14:06) (70 - 98)  BP: 132/60 (13 Feb 2023 14:06) (110/53 - 137/77)  BP(mean): 96 (12 Feb 2023 17:00) (87 - 96)  RR: 16 (13 Feb 2023 14:06) (16 - 21)  SpO2: 99% (13 Feb 2023 14:06) (96% - 100%)    Parameters below as of 13 Feb 2023 14:06  Patient On (Oxygen Delivery Method): room air        PHYSICAL EXAMINATION:  GENERAL: NAD, well built  HEAD:  Atraumatic, Normocephalic  EYES:  conjunctiva and sclera clear  NECK: Supple, No JVD, Normal thyroid  CHEST/LUNG: Clear to auscultation. Clear to percussion bilaterally; No rales, rhonchi, wheezing, or rubs  HEART: Regular rate and rhythm; No murmurs, rubs, or gallops  ABDOMEN: Soft, Nontender, Nondistended; Bowel sounds present  NERVOUS SYSTEM:  Alert & Oriented X3,    EXTREMITIES:  2+ Peripheral Pulses, No clubbing, cyanosis, or edema  SKIN: warm dry                          8.8    9.69  )-----------( 146      ( 13 Feb 2023 06:05 )             27.3     02-13    139  |  110<H>  |  20<H>  ----------------------------<  103<H>  5.0   |  23  |  1.09    Ca    8.3<L>      13 Feb 2023 06:05  Phos  1.8     02-13  Mg     1.9     02-13    TPro  5.1<L>  /  Alb  2.2<L>  /  TBili  0.4  /  DBili  x   /  AST  22  /  ALT  18  /  AlkPhos  103  02-13    LIVER FUNCTIONS - ( 13 Feb 2023 06:05 )  Alb: 2.2 g/dL / Pro: 5.1 g/dL / ALK PHOS: 103 U/L / ALT: 18 U/L DA / AST: 22 U/L / GGT: x               PT/INR - ( 13 Feb 2023 06:05 )   PT: 19.6 sec;   INR: 1.64 ratio         PTT - ( 13 Feb 2023 06:05 )  PTT:30.3 sec    CAPILLARY BLOOD GLUCOSE      RADIOLOGY & ADDITIONAL TESTS:

## 2023-02-13 NOTE — CHART NOTE - NSCHARTNOTEFT_GEN_A_CORE
Reassessment:     Patient is a 82y old  Male who presents with a chief complaint of abdominal pain (2023 14:22)      Factors impacting intake: [ ] none [ ] nausea  [ ] vomiting [ ] diarrhea [ ] constipation  [ ]chewing problems [ ] swallowing issues  [X ] other: Obstruction of biliary stent, UGIB (upper gastrointestinal bleed), HTN, Hypothyroid    Diet Prescription: Diet, NPO after Midnight:      NPO Start Date: 2023,   NPO Start Time: 23:59 (23 @ 07:34)  Diet, Regular:   Supplement Feeding Modality:  Oral  Ensure Enlive Cans or Servings Per Day:  1       Frequency:  Two Times a day (23 @ 15:13)    Intake: Patient visited, "resting", NPO & s/p EGD ERCP w/balloon sweep with biliary stent placement in am, followed by GI team, rec. advance diet with nutrition supplement as ordered on 23 as medically feasible. RD available.     Daily Weight in k.4 (2023 05:00)  Weight in k.1 (2023 08:00)  Weight in k.4 (10 Feb 2023 07:00)  Weight in k.1 (2023 07:00)  Weight in k.8 (2023 07:00)    % Weight Change; wt. fluctuation noted    Pertinent Medications: MEDICATIONS  (STANDING):  cefTRIAXone   IVPB 2000 milliGRAM(s) IV Intermittent every 24 hours  chlorhexidine 2% Cloths 1 Application(s) Topical <User Schedule>  levothyroxine 25 MICROGram(s) Oral daily  mupirocin 2% Ointment 1 Application(s) Topical two times a day  pantoprazole  Injectable 40 milliGRAM(s) IV Push two times a day  polyethylene glycol 3350 17 Gram(s) Oral every 12 hours  senna 2 Tablet(s) Oral at bedtime    MEDICATIONS  (PRN):  acetaminophen     Tablet .. 650 milliGRAM(s) Oral every 6 hours PRN Temp greater or equal to 38C (100.4F), Mild Pain (1 - 3)  ondansetron Injectable 4 milliGRAM(s) IV Push once PRN Nausea and/or Vomiting    Pertinent Labs:  Na139 mmol/L Glu 103 mg/dL<H> K+ 5.0 mmol/L Cr  1.09 mg/dL BUN 20 mg/dL<H>  Phos 1.8 mg/dL<L>  Alb 2.2 g/dL<L>     CAPILLARY BLOOD GLUCOSE      Skin: pressure injuries w/wound care    Estimated Needs:   [X] no change since previous assessment  [ ] recalculated:     Previous Nutrition Diagnosis:   [ ] Inadequate Energy Intake [ ]Inadequate Oral Intake [ ] Excessive Energy Intake   [ ] Underweight [ ] Increased Nutrient Needs [ ] Overweight/Obesity   [ ] Altered GI Function [ ] Unintended Weight Loss [ ] Food & Nutrition Related Knowledge Deficit [Severe ] Malnutrition     Nutrition Diagnosis is [X ] ongoing  [ ] resolved [ ] not applicable     New Nutrition Diagnosis: [ ] not applicable     Interventions: Optimal nutrition meeting >75% of energy nutrient needs via tolerated route     Recommend  [ ] Change Diet To:  [ ] Nutrition Supplement  [ ] Nutrition Support  [X ] Other: Add MVI/minerals/Vit. C 500mg BID for wound care    Monitoring and Evaluation:   [X ] PO intake [ x ] Tolerance to diet prescription [ x ] weights [ x ] labs[ x ] follow up per protocol  [ ] other:

## 2023-02-13 NOTE — PACU DISCHARGE NOTE - COMMENTS
pt skin was very fragile. pt developed a skin tear probably from the pressure of leaning on that hand and arm throughout the case. wound was dressed and appropriate teams called

## 2023-02-13 NOTE — PROGRESS NOTE ADULT - PROBLEM SELECTOR PLAN 1
Old biliary stent noted on EGD to not be draining, removed during EDG.   ERCP on 2/10 cancelled due to thrombocytopenia (lab error?)  ERCP tentatively scheduled for 2/13 with Dr Nimco Lemus

## 2023-02-13 NOTE — PROGRESS NOTE ADULT - PROBLEM SELECTOR PLAN 2
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  Advanced to regular diet per GI.   Pantoprazole infusion switched to IVP q12h

## 2023-02-14 NOTE — PROGRESS NOTE ADULT - PROBLEM SELECTOR PLAN 1
Old biliary stent noted on EGD to not be draining, removed during EDG.   ERCP on 2/10 cancelled due to thrombocytopenia (lab error?)  ERCP 2/13 : Cholangitis with sludge ,stone fragments and pus dragged from duct. A 10 fr 5cm stent placed.  Trend LFTs Abx to continue Remove stent 6 weeks Clears  GI follow up with Dr. Galdamez OP

## 2023-02-14 NOTE — CONSULT NOTE ADULT - SUBJECTIVE AND OBJECTIVE BOX
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 Patient states that he has been chronic abdominal pain but since yesterday he has been having constipation with dark colored bowel movements. patient has not been able to eat over the last few months. His diet has consisted of mostly ensure. Patient underwent ERCP on 4/22 and was found to have extensive choledocholithiasis and biliary purulence consistent with cholangitis and occluded biliary stent. Stent removed and replaced with new 10Fr/7cm plastic biliary stent. Cholangioscopy was not performed given active cholangitis. Patient considered poor surgical candidate for cholecystectomy. He was d/c with repeat ERCP in 2-3 months for stent removal and possible cholangioscopy. Patient denies any vomiting or any other symptoms including fevers, chills, headaches dizziness, chest pain, SOB, cough, hematuria, dysuria.     In ED VS: T 97.7 HR 85 T 97.6 RR 18 Spo2 96%RA  K 6.5  Cr 1.53  Hb 8.6  CT angio abd/ pelvis shows Cirrhosis.Common duct stent with markedly dilated common duct containing calculi and debris similar in appearance to 04/20/2022.  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 650 milliGRAM(s) Oral every 6 hours PRN  chlorhexidine 2% Cloths 1 Application(s) Topical <User Schedule>  levothyroxine 25 MICROGram(s) Oral daily  ondansetron Injectable 4 milliGRAM(s) IV Push once PRN  pantoprazole  Injectable 40 milliGRAM(s) IV Push two times a day  piperacillin/tazobactam IVPB.. 3.375 Gram(s) IV Intermittent every 8 hours  polyethylene glycol 3350 17 Gram(s) Oral every 12 hours  senna 2 Tablet(s) Oral at bedtime      SOCIAL HISTORY:  Smoker:  YES / NO        PACK YEARS:                         WHEN QUIT?  ETOH use:  YES / NO               FREQUENCY / QUANTITY:  Ilicit Drug use:  YES / NO  Occupation:  Assisted device use (Cane / Walker):  Live with:    FAMILY HISTORY:  Family history of ovarian cancer (Sibling)        VITALS:  Vital Signs Last 24 Hrs  T(C): 36.7 (14 Feb 2023 13:05), Max: 36.7 (14 Feb 2023 13:05)  T(F): 98.1 (14 Feb 2023 13:05), Max: 98.1 (14 Feb 2023 13:05)  HR: 61 (14 Feb 2023 13:05) (59 - 95)  BP: 118/59 (14 Feb 2023 13:05) (105/64 - 131/72)  BP(mean): --  RR: 17 (14 Feb 2023 13:05) (17 - 20)  SpO2: 99% (14 Feb 2023 13:05) (98% - 99%)    Parameters below as of 14 Feb 2023 13:05  Patient On (Oxygen Delivery Method): room air        LABS/DIAGNOSTIC TESTS:                          8.0    5.98  )-----------( 119      ( 14 Feb 2023 08:21 )             25.8     WBC Count: 5.98 K/uL (02-14 @ 08:21)  WBC Count: 9.69 K/uL (02-13 @ 06:05)  WBC Count: 8.59 K/uL (02-12 @ 03:45)      02-14    139  |  111<H>  |  17  ----------------------------<  81  4.3   |  23  |  0.95    Ca    8.1<L>      14 Feb 2023 08:21  Phos  2.8     02-14  Mg     1.8     02-14    TPro  4.7<L>  /  Alb  1.9<L>  /  TBili  0.4  /  DBili  0.2  /  AST  19  /  ALT  13  /  AlkPhos  79  02-14          LIVER FUNCTIONS - ( 14 Feb 2023 08:21 )  Alb: 1.9 g/dL / Pro: 4.7 g/dL / ALK PHOS: 79 U/L / ALT: 13 U/L DA / AST: 19 U/L / GGT: x             PT/INR - ( 13 Feb 2023 06:05 )   PT: 19.6 sec;   INR: 1.64 ratio         PTT - ( 13 Feb 2023 06:05 )  PTT:30.3 sec    LACTATE:    ABG -     CULTURES:       RADIOLOGY:< from: CT Abdomen and Pelvis w/ IV Cont (02.07.23 @ 17:52) >  ACC: [id number] EXAM:  CT ABDOMEN AND PELVIS IC   ORDERED BY: KATHRYN RODGERS     PROCEDURE DATE:  02/07/2023          INTERPRETATION:  CLINICAL INFORMATION: 82-year-old man with history   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  Sagittal and coronal reformats were performed.    FINDINGS:  LOWER CHEST: Coronary artery calcification    LIVER: Cirrhosis. No focal lesion identified.  BILE DUCTS: Common duct stent again visualized with dilatation common   duct to approximately 2.1 cm. Apparent intraluminal calculi and debris   again noted similar in appearance to 04/20/2022. Pneumobilia. Mild   intrahepatic biliary duct dilatation.  GALLBLADDER: Gallstones.  SPLEEN: Within normal limits.  PANCREAS: 5 mm cyst proximal pancreatic body. 6 mm cyst pancreatic tail  ADRENALS: Within normal limits.  KIDNEYS/URETERS: Within normal limits.    BLADDER: Within normal limits.  REPRODUCTIVE ORGANS: Normal size prostate    BOWEL: No bowel obstruction. Appendix not visualized.  PERITONEUM: No ascites.  VESSELS: Mural plaque infrarenal abdominal aorta extending to the left   common iliac artery.  RETROPERITONEUM/LYMPH NODES: No lymphadenopathy.  ABDOMINAL WALL: Chronic hematoma right rectus sheath. Fat-containing   umbilical hernia  BONES: Right hip replacement. Degenerative change.    IMPRESSION:  Cirrhosis.    Common duct stent with markedly dilated common duct containing calculi   and debris similar in appearance to 04/20/2022.            --- End of Report ---            YOSI CEDEÑO MD; Attending Radiologist  This document has been electronically signed. 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 Patient states that he has been chronic abdominal pain but since yesterday he has been having constipation with dark colored bowel movements. patient has not been able to eat over the last few months. His diet has consisted of mostly ensure. Patient underwent ERCP on 4/22 and was found to have extensive choledocholithiasis and biliary purulence consistent with cholangitis and occluded biliary stent. Stent removed and replaced with new 10Fr/7cm plastic biliary stent. Cholangioscopy was not performed given active cholangitis. Patient considered poor surgical candidate for cholecystectomy. He was d/c with repeat ERCP in 2-3 months for stent removal and possible cholangioscopy. Patient denies any vomiting or any other symptoms including fevers, chills, headaches dizziness, chest pain, SOB, cough, hematuria, dysuria.     In ED VS: T 97.7 HR 85 T 97.6 RR 18 Spo2 96%RA  K 6.5  Cr 1.53  Hb 8.6  CT angio abd/ pelvis shows Cirrhosis.Common duct stent with markedly dilated common duct containing calculi and debris similar in appearance to 04/20/2022.  s/p 2g Ca 10u Insulin in E D          History as above, asked to see this patient who has been in the hospital for the last 11 days and was admitted with abdominal  pain and found to be COVID + as well. He has a h/o having a CBD stent in place but has been non complaint with his doctor visits and did not go to have it removed. 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 650 milliGRAM(s) Oral every 6 hours PRN  chlorhexidine 2% Cloths 1 Application(s) Topical <User Schedule>  levothyroxine 25 MICROGram(s) Oral daily  ondansetron Injectable 4 milliGRAM(s) IV Push once PRN  pantoprazole  Injectable 40 milliGRAM(s) IV Push two times a day  piperacillin/tazobactam IVPB.. 3.375 Gram(s) IV Intermittent every 8 hours  polyethylene glycol 3350 17 Gram(s) Oral every 12 hours  senna 2 Tablet(s) Oral at bedtime      SOCIAL HISTORY:  Smoker:  YES / NO        PACK YEARS:                         WHEN QUIT?  ETOH use:  YES / NO               FREQUENCY / QUANTITY:  Ilicit Drug use:  YES / NO  Occupation:  Assisted device use (Cane / Walker):  Live with:    FAMILY HISTORY:  Family history of ovarian cancer (Sibling)        VITALS:  Vital Signs Last 24 Hrs  T(C): 36.7 (14 Feb 2023 13:05), Max: 36.7 (14 Feb 2023 13:05)  T(F): 98.1 (14 Feb 2023 13:05), Max: 98.1 (14 Feb 2023 13:05)  HR: 61 (14 Feb 2023 13:05) (59 - 95)  BP: 118/59 (14 Feb 2023 13:05) (105/64 - 131/72)  BP(mean): --  RR: 17 (14 Feb 2023 13:05) (17 - 20)  SpO2: 99% (14 Feb 2023 13:05) (98% - 99%)    Parameters below as of 14 Feb 2023 13:05  Patient On (Oxygen Delivery Method): room air        LABS/DIAGNOSTIC TESTS:                          8.0    5.98  )-----------( 119      ( 14 Feb 2023 08:21 )             25.8     WBC Count: 5.98 K/uL (02-14 @ 08:21)  WBC Count: 9.69 K/uL (02-13 @ 06:05)  WBC Count: 8.59 K/uL (02-12 @ 03:45)      02-14    139  |  111<H>  |  17  ----------------------------<  81  4.3   |  23  |  0.95    Ca    8.1<L>      14 Feb 2023 08:21  Phos  2.8     02-14  Mg     1.8     02-14    TPro  4.7<L>  /  Alb  1.9<L>  /  TBili  0.4  /  DBili  0.2  /  AST  19  /  ALT  13  /  AlkPhos  79  02-14          LIVER FUNCTIONS - ( 14 Feb 2023 08:21 )  Alb: 1.9 g/dL / Pro: 4.7 g/dL / ALK PHOS: 79 U/L / ALT: 13 U/L DA / AST: 19 U/L / GGT: x             PT/INR - ( 13 Feb 2023 06:05 )   PT: 19.6 sec;   INR: 1.64 ratio         PTT - ( 13 Feb 2023 06:05 )  PTT:30.3 sec    LACTATE:    ABG -     CULTURES:       RADIOLOGY:< from: CT Abdomen and Pelvis w/ IV Cont (02.07.23 @ 17:52) >  ACC: [id number] EXAM:  CT ABDOMEN AND PELVIS IC   ORDERED BY: KATHRYN RODGERS     PROCEDURE DATE:  02/07/2023          INTERPRETATION:  CLINICAL INFORMATION: 82-year-old man with history   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  Sagittal and coronal reformats were performed.    FINDINGS:  LOWER CHEST: Coronary artery calcification    LIVER: Cirrhosis. No focal lesion identified.  BILE DUCTS: Common duct stent again visualized with dilatation common   duct to approximately 2.1 cm. Apparent intraluminal calculi and debris   again noted similar in appearance to 04/20/2022. Pneumobilia. Mild   intrahepatic biliary duct dilatation.  GALLBLADDER: Gallstones.  SPLEEN: Within normal limits.  PANCREAS: 5 mm cyst proximal pancreatic body. 6 mm cyst pancreatic tail  ADRENALS: Within normal limits.  KIDNEYS/URETERS: Within normal limits.    BLADDER: Within normal limits.  REPRODUCTIVE ORGANS: Normal size prostate    BOWEL: No bowel obstruction. Appendix not visualized.  PERITONEUM: No ascites.  VESSELS: Mural plaque infrarenal abdominal aorta extending to the left   common iliac artery.  RETROPERITONEUM/LYMPH NODES: No lymphadenopathy.  ABDOMINAL WALL: Chronic hematoma right rectus sheath. Fat-containing   umbilical hernia  BONES: Right hip replacement. Degenerative change.    IMPRESSION:  Cirrhosis.    Common duct stent with markedly dilated common duct containing calculi   and debris similar in appearance to 04/20/2022.            --- End of Report ---            YOSI CEDEÑO MD; Attending Radiologist  This document has been electronically signed. 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 Patient states that he has been chronic abdominal pain but since yesterday he has been having constipation with dark colored bowel movements. patient has not been able to eat over the last few months. His diet has consisted of mostly ensure. Patient underwent ERCP on 4/22 and was found to have extensive choledocholithiasis and biliary purulence consistent with cholangitis and occluded biliary stent. Stent removed and replaced with new 10Fr/7cm plastic biliary stent. Cholangioscopy was not performed given active cholangitis. Patient considered poor surgical candidate for cholecystectomy. He was d/c with repeat ERCP in 2-3 months for stent removal and possible cholangioscopy. Patient denies any vomiting or any other symptoms including fevers, chills, headaches dizziness, chest pain, SOB, cough, hematuria, dysuria.     In ED VS: T 97.7 HR 85 T 97.6 RR 18 Spo2 96%RA  K 6.5  Cr 1.53  Hb 8.6  CT angio abd/ pelvis shows Cirrhosis.Common duct stent with markedly dilated common duct containing calculi and debris similar in appearance to 04/20/2022.  s/p 2g Ca 10u Insulin in E D          History as above, asked to see this patient who has been in the hospital for the last 11 days and was admitted with abdominal  pain and found to be COVID + as well. He has a h/o having a CBD stent in place but has been non complaint with his doctor visits and did not go to have it removed. He had an EGD with removal and was treated for SBP empirically with Rocephin for 5 days. 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 650 milliGRAM(s) Oral every 6 hours PRN  chlorhexidine 2% Cloths 1 Application(s) Topical <User Schedule>  levothyroxine 25 MICROGram(s) Oral daily  ondansetron Injectable 4 milliGRAM(s) IV Push once PRN  pantoprazole  Injectable 40 milliGRAM(s) IV Push two times a day  piperacillin/tazobactam IVPB.. 3.375 Gram(s) IV Intermittent every 8 hours  polyethylene glycol 3350 17 Gram(s) Oral every 12 hours  senna 2 Tablet(s) Oral at bedtime      SOCIAL HISTORY:  Smoker:  YES / NO        PACK YEARS:                         WHEN QUIT?  ETOH use:  YES / NO               FREQUENCY / QUANTITY:  Ilicit Drug use:  YES / NO  Occupation:  Assisted device use (Cane / Walker):  Live with:    FAMILY HISTORY:  Family history of ovarian cancer (Sibling)        VITALS:  Vital Signs Last 24 Hrs  T(C): 36.7 (14 Feb 2023 13:05), Max: 36.7 (14 Feb 2023 13:05)  T(F): 98.1 (14 Feb 2023 13:05), Max: 98.1 (14 Feb 2023 13:05)  HR: 61 (14 Feb 2023 13:05) (59 - 95)  BP: 118/59 (14 Feb 2023 13:05) (105/64 - 131/72)  BP(mean): --  RR: 17 (14 Feb 2023 13:05) (17 - 20)  SpO2: 99% (14 Feb 2023 13:05) (98% - 99%)    Parameters below as of 14 Feb 2023 13:05  Patient On (Oxygen Delivery Method): room air        LABS/DIAGNOSTIC TESTS:                          8.0    5.98  )-----------( 119      ( 14 Feb 2023 08:21 )             25.8     WBC Count: 5.98 K/uL (02-14 @ 08:21)  WBC Count: 9.69 K/uL (02-13 @ 06:05)  WBC Count: 8.59 K/uL (02-12 @ 03:45)      02-14    139  |  111<H>  |  17  ----------------------------<  81  4.3   |  23  |  0.95    Ca    8.1<L>      14 Feb 2023 08:21  Phos  2.8     02-14  Mg     1.8     02-14    TPro  4.7<L>  /  Alb  1.9<L>  /  TBili  0.4  /  DBili  0.2  /  AST  19  /  ALT  13  /  AlkPhos  79  02-14          LIVER FUNCTIONS - ( 14 Feb 2023 08:21 )  Alb: 1.9 g/dL / Pro: 4.7 g/dL / ALK PHOS: 79 U/L / ALT: 13 U/L DA / AST: 19 U/L / GGT: x             PT/INR - ( 13 Feb 2023 06:05 )   PT: 19.6 sec;   INR: 1.64 ratio         PTT - ( 13 Feb 2023 06:05 )  PTT:30.3 sec    LACTATE:    ABG -     CULTURES:       RADIOLOGY:< from: CT Abdomen and Pelvis w/ IV Cont (02.07.23 @ 17:52) >  ACC: [id number] EXAM:  CT ABDOMEN AND PELVIS IC   ORDERED BY: KATHRYN RODGERS     PROCEDURE DATE:  02/07/2023          INTERPRETATION:  CLINICAL INFORMATION: 82-year-old man with history   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  Sagittal and coronal reformats were performed.    FINDINGS:  LOWER CHEST: Coronary artery calcification    LIVER: Cirrhosis. No focal lesion identified.  BILE DUCTS: Common duct stent again visualized with dilatation common   duct to approximately 2.1 cm. Apparent intraluminal calculi and debris   again noted similar in appearance to 04/20/2022. Pneumobilia. Mild   intrahepatic biliary duct dilatation.  GALLBLADDER: Gallstones.  SPLEEN: Within normal limits.  PANCREAS: 5 mm cyst proximal pancreatic body. 6 mm cyst pancreatic tail  ADRENALS: Within normal limits.  KIDNEYS/URETERS: Within normal limits.    BLADDER: Within normal limits.  REPRODUCTIVE ORGANS: Normal size prostate    BOWEL: No bowel obstruction. Appendix not visualized.  PERITONEUM: No ascites.  VESSELS: Mural plaque infrarenal abdominal aorta extending to the left   common iliac artery.  RETROPERITONEUM/LYMPH NODES: No lymphadenopathy.  ABDOMINAL WALL: Chronic hematoma right rectus sheath. Fat-containing   umbilical hernia  BONES: Right hip replacement. Degenerative change.    IMPRESSION:  Cirrhosis.    Common duct stent with markedly dilated common duct containing calculi   and debris similar in appearance to 04/20/2022.            --- End of Report ---            YOSI CEDEÑO MD; Attending Radiologist  This document has been electronically signed. 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 Patient states that he has been chronic abdominal pain but since yesterday he has been having constipation with dark colored bowel movements. patient has not been able to eat over the last few months. His diet has consisted of mostly ensure. Patient underwent ERCP on 4/22 and was found to have extensive choledocholithiasis and biliary purulence consistent with cholangitis and occluded biliary stent. Stent removed and replaced with new 10Fr/7cm plastic biliary stent. Cholangioscopy was not performed given active cholangitis. Patient considered poor surgical candidate for cholecystectomy. He was d/c with repeat ERCP in 2-3 months for stent removal and possible cholangioscopy. Patient denies any vomiting or any other symptoms including fevers, chills, headaches dizziness, chest pain, SOB, cough, hematuria, dysuria.     In ED VS: T 97.7 HR 85 T 97.6 RR 18 Spo2 96%RA  K 6.5  Cr 1.53  Hb 8.6  CT angio abd/ pelvis shows Cirrhosis.Common duct stent with markedly dilated common duct containing calculi and debris similar in appearance to 04/20/2022.  s/p 2g Ca 10u Insulin in E D          History as above, asked to see this patient who has been in the hospital for the last 11 days and was admitted with abdominal  pain and found to be COVID + as well. He has a h/o having a CBD stent in place but has been non complaint with his doctor visits and did not go to have it removed. He had an EGD with removal and was treated for SBP empirically with Rocephin for 5 days. He had a ERCP with a new stent placement just yesterday and was found to have pus and stones in the duct and so placed on Zosyn and I was asked to see the patient. He has no fevers or chills, he is denying any abdominal pain, nausea, vomiting or diarrhea at this time. He has no SOB or coughing and he is asymptomatic from his COVID.       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 650 milliGRAM(s) Oral every 6 hours PRN  chlorhexidine 2% Cloths 1 Application(s) Topical <User Schedule>  levothyroxine 25 MICROGram(s) Oral daily  ondansetron Injectable 4 milliGRAM(s) IV Push once PRN  pantoprazole  Injectable 40 milliGRAM(s) IV Push two times a day  piperacillin/tazobactam IVPB.. 3.375 Gram(s) IV Intermittent every 8 hours  polyethylene glycol 3350 17 Gram(s) Oral every 12 hours  senna 2 Tablet(s) Oral at bedtime      SOCIAL HISTORY:  Smoker:  ex smoker  ETOH use:  ex alcohol abuser  Ilicit Drug use:  no    FAMILY HISTORY:  Family history of ovarian cancer (Sibling)        VITALS:  Vital Signs Last 24 Hrs  T(C): 36.7 (14 Feb 2023 13:05), Max: 36.7 (14 Feb 2023 13:05)  T(F): 98.1 (14 Feb 2023 13:05), Max: 98.1 (14 Feb 2023 13:05)  HR: 61 (14 Feb 2023 13:05) (59 - 95)  BP: 118/59 (14 Feb 2023 13:05) (105/64 - 131/72)  BP(mean): --  RR: 17 (14 Feb 2023 13:05) (17 - 20)  SpO2: 99% (14 Feb 2023 13:05) (98% - 99%)    Parameters below as of 14 Feb 2023 13:05  Patient On (Oxygen Delivery Method): room air        LABS/DIAGNOSTIC TESTS:                          8.0    5.98  )-----------( 119      ( 14 Feb 2023 08:21 )             25.8     WBC Count: 5.98 K/uL (02-14 @ 08:21)  WBC Count: 9.69 K/uL (02-13 @ 06:05)  WBC Count: 8.59 K/uL (02-12 @ 03:45)      02-14    139  |  111<H>  |  17  ----------------------------<  81  4.3   |  23  |  0.95    Ca    8.1<L>      14 Feb 2023 08:21  Phos  2.8     02-14  Mg     1.8     02-14    TPro  4.7<L>  /  Alb  1.9<L>  /  TBili  0.4  /  DBili  0.2  /  AST  19  /  ALT  13  /  AlkPhos  79  02-14          LIVER FUNCTIONS - ( 14 Feb 2023 08:21 )  Alb: 1.9 g/dL / Pro: 4.7 g/dL / ALK PHOS: 79 U/L / ALT: 13 U/L DA / AST: 19 U/L / GGT: x             PT/INR - ( 13 Feb 2023 06:05 )   PT: 19.6 sec;   INR: 1.64 ratio         PTT - ( 13 Feb 2023 06:05 )  PTT:30.3 sec    LACTATE:    ABG -     CULTURES:       RADIOLOGY:< from: CT Abdomen and Pelvis w/ IV Cont (02.07.23 @ 17:52) >  ACC: [id number] EXAM:  CT ABDOMEN AND PELVIS IC   ORDERED BY: KATHRYN RODGERS     PROCEDURE DATE:  02/07/2023          INTERPRETATION:  CLINICAL INFORMATION: 82-year-old man with history   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  Sagittal and coronal reformats were performed.    FINDINGS:  LOWER CHEST: Coronary artery calcification    LIVER: Cirrhosis. No focal lesion identified.  BILE DUCTS: Common duct stent again visualized with dilatation common   duct to approximately 2.1 cm. Apparent intraluminal calculi and debris   again noted similar in appearance to 04/20/2022. Pneumobilia. Mild   intrahepatic biliary duct dilatation.  GALLBLADDER: Gallstones.  SPLEEN: Within normal limits.  PANCREAS: 5 mm cyst proximal pancreatic body. 6 mm cyst pancreatic tail  ADRENALS: Within normal limits.  KIDNEYS/URETERS: Within normal limits.    BLADDER: Within normal limits.  REPRODUCTIVE ORGANS: Normal size prostate    BOWEL: No bowel obstruction. Appendix not visualized.  PERITONEUM: No ascites.  VESSELS: Mural plaque infrarenal abdominal aorta extending to the left   common iliac artery.  RETROPERITONEUM/LYMPH NODES: No lymphadenopathy.  ABDOMINAL WALL: Chronic hematoma right rectus sheath. Fat-containing   umbilical hernia  BONES: Right hip replacement. Degenerative change.    IMPRESSION:  Cirrhosis.    Common duct stent with markedly dilated common duct containing calculi   and debris similar in appearance to 04/20/2022.            --- End of Report ---            YOSI CEDEÑO MD; Attending Radiologist  This document has been electronically signed. Feb 7 2023  6:37PM    < end of copied text >        ROS  [  ] UNABLE TO ELICIT

## 2023-02-14 NOTE — PROGRESS NOTE ADULT - ASSESSMENT
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 Primary GI Dr. Lemus performed an EGD on 2/8/23 (varices in lower third of esophagus, old biliary stent not draining thus removed). Advanced GI Dr. Rinaldi consulted for ERCP.    Patient is well known to the GI service, collateral obtained from chart review.  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 Poor compliance and follow up with GI outpatient.     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 Advanced GI consulted for ERCP 2/2 CBD dilatation and cholelithiasis/choledo. CTAP notable for cirrhosis and common duct stent with markedly dilated common duct containing calculi and debris, similar in appearance to 4/20/22. LFTs wnl, VSS, afebrile. Anemic with Hgb 7.5 today, s/p 2u PRBC on 2/7 for Hgb 6.4, post transfusion Hgb 8.3, responded appropriately. s/p Octreotide gtt. Continues on IV Protonix infusion.   2/13: ERCP with stent placement, cholangitis with sludge, stone fragments & pus from duct.   2/14: Tolerating CLD, no acute complaints. VSS, afebrile.     	- s/p ERCP on 2/13, stent placed  	- Continue IV abx for cholangitis  	- Outpatient follow up with Dr. Galdamez for stent removal in 6 weeks  	- Maintain active T&S, 2 large bore peripheral IVs, transfuse for goal Hgb >7 and plt >50

## 2023-02-14 NOTE — PROGRESS NOTE ADULT - SUBJECTIVE AND OBJECTIVE BOX
Advanced GI Progress Note    Patient is a 82y old  Male who presents with a chief complaint of abdominal pain (13 Feb 2023 14:22)    GI was consulted for [facility] - ERCP.    24-HOUR INTERVAL EVENTS: Patient resting in bed. s/p ERCP, tolerating CLD, denies n/v/d. Emphasized importance of outpatient follow up, patient verbalized understanding.    MEDICATIONS  (STANDING):  chlorhexidine 2% Cloths 1 Application(s) Topical <User Schedule>  levothyroxine 25 MICROGram(s) Oral daily  mupirocin 2% Ointment 1 Application(s) Topical two times a day  pantoprazole  Injectable 40 milliGRAM(s) IV Push two times a day  piperacillin/tazobactam IVPB. 3.375 Gram(s) IV Intermittent once  piperacillin/tazobactam IVPB.- 3.375 Gram(s) IV Intermittent once  piperacillin/tazobactam IVPB.. 3.375 Gram(s) IV Intermittent every 8 hours  polyethylene glycol 3350 17 Gram(s) Oral every 12 hours  senna 2 Tablet(s) Oral at bedtime    MEDICATIONS  (PRN):  acetaminophen     Tablet .. 650 milliGRAM(s) Oral every 6 hours PRN Temp greater or equal to 38C (100.4F), Mild Pain (1 - 3)  ondansetron Injectable 4 milliGRAM(s) IV Push once PRN Nausea and/or Vomiting    __________________________________________________  REVIEW OF SYSTEMS:  A detailed set of ROS were asked and negative except those outlined in GI HPI above/below.   ________________________________________________  PHYSICAL EXAM    Vital Signs Last 24 Hrs  T(C): 36.6 (14 Feb 2023 04:42), Max: 36.8 (13 Feb 2023 12:44)  T(F): 97.9 (14 Feb 2023 04:42), Max: 98.2 (13 Feb 2023 12:44)  HR: 95 (14 Feb 2023 10:52) (59 - 95)  BP: 117/73 (14 Feb 2023 10:52) (105/64 - 157/59)  BP(mean): --  RR: 17 (14 Feb 2023 04:42) (16 - 20)  SpO2: 98% (14 Feb 2023 04:42) (98% - 100%)    Parameters below as of 14 Feb 2023 04:42  Patient On (Oxygen Delivery Method): room air        GEN: NAD  HEENT: EOMI, conjunctivae anicteric, neck supple, moist mucous membranes  PULM: LCTAB, no wheezing, rales, or rhonchi  CV: RRR, no m/r/g  GI: soft, NT, ND; +BS in all four quadrants, no ascites, no Garduno's sign  MSK: JACKSON, no edema  NEURO: A&O x 3, no gross deficits  _________________________________________________  LABS:                        8.0    5.98  )-----------( 119      ( 14 Feb 2023 08:21 )             25.8     02-14    139  |  111<H>  |  17  ----------------------------<  81  4.3   |  23  |  0.95    Ca    8.1<L>      14 Feb 2023 08:21  Phos  2.8     02-14  Mg     1.8     02-14    TPro  4.7<L>  /  Alb  1.9<L>  /  TBili  0.4  /  DBili  0.2  /  AST  19  /  ALT  13  /  AlkPhos  79  02-14    PT/INR - ( 13 Feb 2023 06:05 )   PT: 19.6 sec;   INR: 1.64 ratio         PTT - ( 13 Feb 2023 06:05 )  PTT:30.3 sec    CAPILLARY BLOOD GLUCOSE        COVID-19 PCR: NotDetec (13 Feb 2023 09:31)  COVID-19 PCR: NotDetec (07 Feb 2023 14:08)      RADIOLOGY & ADDITIONAL TESTS:    ERCP (6/22/21)  Impression:          - Endoscopic retrograde cholangiopancreatography with                   stent exchange, cholangioscopy with EHL, and stone                        removal.      ERCP (4/22/22)  Extensive choledocholithiasis and biliary purulence consistent with cholangitis  and occluded biliary stent. Stent removed and replaced with new 10Fr/7cm plastic  biliary stent. Cholangioscopy not performed given active cholangitis.     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  Otherwise normal esophagus.  Normal mucosa in the whole stomach.  Normal mucosa in the whole examined duodenum.  Old biliary stent present that was not draining. It was removed with a snare.    ERCP (2/13/23):  ERCP Findings:    The duodenoscope was passed into the second portion of the duodenum. The major  papilla area was identified and appeared unremarkable post prior sphincterotomy.  Stent pulled by Dr Lemus last week. CBD cannulated with 9-12mm balloon and  wire. Multiple balloon sweeps made with Pus, Sludge and stone fragments removed.  Intrahepatics mildly pruned. CBD and CHD relatively normal without any obvious  stricture seen. PD not cannulated. A 10fr 5cm stent placed. Good flow of bile  seen from stent.

## 2023-02-14 NOTE — CONSULT NOTE ADULT - LYMPHATIC
Impression: Cataracts, OU. Status: Symptomatic.  Plan: Followed by Dr. Leo Galo
Impression: Glaucoma, OU. Status: Chronic. Plan: Monitor IOP.  Coordinate care with Dr. Amadou Rosa
Impression: IMELDA BENJAMIN. Status: Symptomatic.  Plan: AFT's
Impression: Macular degeneration, dry OS. Status: Symptomatic. Plan: The patient notes blurring. Exam and OCT reveal no IRF OS. An IVFA from 04/23/19 demonstrated no leakage. Fundus Photos from 04/23/19 demonstrated drusen. Discussed AREDS/ I Vit and Amsler grid. Pt on EVOA.
Impression: Macular degeneration, wet OD. Status: Symptomatic.
s/p Lucentis x 11  last 03/09/2021 
s/p Avastin x 24 last 11/17/2020 (consented 02/18/2020) Plan: The patient notes blurring. Exam and OCT reveal a small PED with a mildly blunted foveal depression OD. An IVFA from 04/23/19 demonstrated mild staining. Fundus Photos from 04/23/19 demonstrated drusen. Recommend Lucentis. R/B/A discussed with patient. Patient elects to proceed with Lucentis 0.5mg today. After 2% Subconjunctival anesthesia & betadine prep, Lucentis was injected in the eye with no complications. Remainder discarded. Cold compress and Tylenol suggested for pain if needed. 

Return in 1 month for re eval Nv AMD, OCT OU, possible Lucentis
Impression: PVD, OU. Status: Chronic. Plan: RDW.
No lymphadedenopathy

## 2023-02-14 NOTE — CONSULT NOTE ADULT - GASTROINTESTINAL
soft/nondistended/normal active bowel sounds/no guarding/no rigidity/no organomegaly/no palpable amilcar/tender details…

## 2023-02-14 NOTE — CONSULT NOTE ADULT - ASSESSMENT
Acute Cholangitis  COVID - 19 infection      Plan - Cont Zosyn 3.375 gms iv q8hrs  DC isolation for COVID  when ready for discharge then will switch to po abxs likely Ceftin and flagyl.

## 2023-02-14 NOTE — PROGRESS NOTE ADULT - SUBJECTIVE AND OBJECTIVE BOX
PGY-1 Progress Note discussed with attending    PAGER #: [phone]] TILL 5:00 PM  PLEASE CONTACT ON CALL TEAM:  - On Call Team (Please refer to Yonny) FROM 5:00 PM - 8:30PM  - Nightfloat Team FROM 8:30 -7:30 AM    CHIEF COMPLAINT & BRIEF HOSPITAL COURSE:    INTERVAL HPI/OVERNIGHT EVENTS:       REVIEW OF SYSTEMS:  CONSTITUTIONAL: No fever, weight loss, or fatigue  RESPIRATORY: No cough, wheezing, chills or hemoptysis; No shortness of breath  CARDIOVASCULAR: No chest pain, palpitations, dizziness, or leg swelling  GASTROINTESTINAL: No abdominal pain. No nausea, vomiting, or hematemesis; No diarrhea or constipation. No melena or hematochezia.  GENITOURINARY: No dysuria or hematuria, urinary frequency  NEUROLOGICAL: No headaches, memory loss, loss of strength, numbness, or tremors  SKIN: No itching, burning, rashes, or lesions     Vital Signs Last 24 Hrs  T(C): 36.7 (14 Feb 2023 13:05), Max: 36.7 (13 Feb 2023 18:42)  T(F): 98.1 (14 Feb 2023 13:05), Max: 98.1 (14 Feb 2023 13:05)  HR: 61 (14 Feb 2023 13:05) (59 - 95)  BP: 118/59 (14 Feb 2023 13:05) (105/64 - 157/59)  BP(mean): --  RR: 17 (14 Feb 2023 13:05) (16 - 20)  SpO2: 99% (14 Feb 2023 13:05) (98% - 100%)    Parameters below as of 14 Feb 2023 13:05  Patient On (Oxygen Delivery Method): room air      PHYSICAL EXAMINATION:  GENERAL: NAD, well built  HEAD:  Atraumatic, Normocephalic  EYES:  conjunctiva and sclera clear  NECK: Supple, No JVD, Normal thyroid  CHEST/LUNG: Clear to auscultation. Clear to percussion bilaterally; No rales, rhonchi, wheezing, or rubs  HEART: Regular rate and rhythm; No murmurs, rubs, or gallops  ABDOMEN: Soft, Nontender, Nondistended; Bowel sounds present  NERVOUS SYSTEM:  Alert & Oriented X3,    EXTREMITIES:  2+ Peripheral Pulses, No clubbing, cyanosis, or edema  SKIN: warm                         8.0    5.98  )-----------( 119      ( 14 Feb 2023 08:21 )             25.8     02-14    139  |  111<H>  |  17  ----------------------------<  81  4.3   |  23  |  0.95    Ca    8.1<L>      14 Feb 2023 08:21  Phos  2.8     02-14  Mg     1.8     02-14    TPro  4.7<L>  /  Alb  1.9<L>  /  TBili  0.4  /  DBili  0.2  /  AST  19  /  ALT  13  /  AlkPhos  79  02-14    LIVER FUNCTIONS - ( 14 Feb 2023 08:21 )  Alb: 1.9 g/dL / Pro: 4.7 g/dL / ALK PHOS: 79 U/L / ALT: 13 U/L DA / AST: 19 U/L / GGT: x               PT/INR - ( 13 Feb 2023 06:05 )   PT: 19.6 sec;   INR: 1.64 ratio         PTT - ( 13 Feb 2023 06:05 )  PTT:30.3 sec    CAPILLARY BLOOD GLUCOSE      RADIOLOGY & ADDITIONAL TESTS:

## 2023-02-14 NOTE — PROGRESS NOTE ADULT - ATTENDING COMMENTS
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 Patient states that he has been chronic abdominal pain but since yesterday he has been having constipation with dark colored bowel movements. patient has not been able to eat over the last few months. His diet has consisted of mostly ensure. Patient underwent ERCP on 4/22 and was found to have extensive choledocholithiasis and biliary purulence consistent with cholangitis and occluded biliary stent. Stent removed and replaced with new 10Fr/7cm plastic biliary stent. Cholangioscopy was not performed given active cholangitis. Repeated Hb dropped to 6.5. BP significantly dropped. ICU was consulted for hypotension and symptomatic anemia with suspected GI bleed    Assessment:  1. Acute blood loss anemia  2. Gastrointestinal bleeding  3. Hypovolemic shock   4. Chronic liver cirrhosis   5. Choledocholithiasis     Plan:  S/p EGD 2/8 no obvious source of bleeding  Cont. to transfuse blood products to maintain hgb > 7   ERCP tentantively for Monday  Ceftriaxone for SBP prophylaxis   2 large bore IVs    Trend hgb   Active Type and screen   Advance diet per GI  D/c IV fluids as tolerating diet  PPI   SCD for DVT prophylaxis   Volume resuscitation    Strict IO   Monitor and replete electrolytes   Hemodynamic monitoring   Transfer out of ICU.
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 Patient states that he has been chronic abdominal pain but since yesterday he has been having constipation with dark colored bowel movements. patient has not been able to eat over the last few months. His diet has consisted of mostly ensure. Patient underwent ERCP on 4/22 and was found to have extensive choledocholithiasis and biliary purulence consistent with cholangitis and occluded biliary stent. Stent removed and replaced with new 10Fr/7cm plastic biliary stent. Cholangioscopy was not performed given active cholangitis. Repeated Hb dropped to 6.5. BP significantly dropped. ICU was consulted for hypotension and symptomatic anemia with suspected GI bleed    Assessment:  1. Acute blood loss anemia  2. Gastrointestinal bleeding  3. Hypovolemic shock   4. Chronic liver cirrhosis   5. Choledocholithiasis     Plan:  S/p EGD 2/8 no obvious source of bleeding  Cont. to transfuse blood products to maintain hgb > 7   ERCP tentatively for Monday  Ceftriaxone for SBP prophylaxis   2 large bore IVs    Trend hgb   Active Type and screen   Advance diet per GI  PPI   SCD for DVT prophylaxis   Strict IO   Monitor and replete electrolytes   Hemodynamic monitoring   PT evaluation   Transfer out of ICU.
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    ERCP on 4/22 and was found to have extensive choledocholithiasis and biliary purulence consistent with cholangitis and occluded biliary stent. Stent removed and replaced with new 10Fr/7cm plastic biliary stent. Patient considered poor surgical candidate for cholecystectomy.      T(C): 36.3 (02-07-23 @ 23:15), Max: 36.8 (02-07-23 @ 19:05)  HR: 81 (02-07-23 @ 23:15) (81 - 96)  BP: 90/58 (02-07-23 @ 23:15) (75/52 - 93/60)  RR: 18 (02-07-23 @ 23:15) (18 - 18)  SpO2: 100% (02-07-23 @ 23:15) (99% - 100%)      GIB given h/o esophageal varices  - PPI IV drip  - Ocreotride drip  - NPO after midnight   GI consult appreciated     Hyperkalemia Hold Spironolactone  KAY on CKD - fluids   Monitor renal function   Liver Cirhhosis CT abdomen stable
Patient seen and examined    T(C): 36.2 (02-13-23 @ 20:27), Max: 36.8 (02-13-23 @ 12:44)  HR: 62 (02-13-23 @ 20:27) (60 - 82)  BP: 131/72 (02-13-23 @ 20:27) (122/56 - 157/59)  RR: 20 (02-13-23 @ 20:27) (16 - 20)  SpO2: 98% (02-13-23 @ 20:27) (98% - 100%)    Reviewed labs and imaging     GI Bleed   Hypotension BP optimized   s/p EGD   s/p MICU stay     Patient for ERCP stent placement  today   Transfuse prn
Reviewed labs and imaging     GI Bleed   Hypotension BP optimized   s/p EGD   s/p MICU stay     s/p ERCP : Cholangitis with sludge ,stone fragments and pus dragged from duct. A 10 fr 5cm stent placed.  Add Zosyn   ID consult

## 2023-02-15 NOTE — PROGRESS NOTE ADULT - PROBLEM SELECTOR PLAN 1
Old biliary stent noted on EGD to not be draining, removed during EDG.   ERCP on 2/10 cancelled due to thrombocytopenia (lab error?)  ERCP 2/13 : Cholangitis with sludge ,stone fragments and pus dragged from duct. A 10 fr 5cm stent placed.  Trend LFTs Abx to continue Remove stent 6 weeks Clears  GI follow up with Dr. Galdamez OP Old biliary stent noted on EGD to not be draining, removed during EDG.   ERCP on 2/10 cancelled due to thrombocytopenia (lab error?)  ERCP 2/13 : Cholangitis with sludge ,stone fragments and pus dragged from duct. A 10 fr 5cm stent placed.  Trend LFTs Abx to continue Remove stent 6 weeks   diet advanced  GI follow up with Dr. Galdamez OP

## 2023-02-15 NOTE — PROGRESS NOTE ADULT - PROBLEM SELECTOR PROBLEM 6
Prophylactic measure
KAY (acute kidney injury)
HTN (hypertension)
HTN (hypertension)

## 2023-02-15 NOTE — PROGRESS NOTE ADULT - SUBJECTIVE AND OBJECTIVE BOX
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 Patient states that he has been chronic abdominal pain but since yesterday he has been having constipation with dark colored bowel movements. patient has not been able to eat over the last few months. His diet has consisted of mostly ensure. Patient underwent ERCP on 4/22 and was found to have extensive choledocholithiasis and biliary purulence consistent with cholangitis and occluded biliary stent. Stent removed and replaced with new 10Fr/7cm plastic biliary stent. Cholangioscopy was not performed given active cholangitis. Patient considered poor surgical candidate for cholecystectomy. He was d/c with repeat ERCP in 2-3 months for stent removal and possible cholangioscopy. Patient denies any vomiting or any other symptoms including fevers, chills, headaches dizziness, chest pain, SOB, cough, hematuria, dysuria.     In ED VS: T 97.7 HR 85 T 97.6 RR 18 Spo2 96%RA  K 6.5  Cr 1.53  Hb 8.6  CT angio abd/ pelvis shows Cirrhosis.Common duct stent with markedly dilated common duct containing calculi and debris similar in appearance to 04/20/2022.  s/p 2g Ca 10u Insulin in E D         (07 Feb 2023 19:14)      Patient is a 82y old  Male who presents with a chief complaint of abdominal pain (15 Feb 2023 10:26)      INTERVAL HPI/OVERNIGHT EVENTS:  T(C): 36.8 (02-15-23 @ 11:55), Max: 37 (02-14-23 @ 20:12)  HR: 72 (02-15-23 @ 11:55) (60 - 72)  BP: 112/65 (02-15-23 @ 11:55) (112/65 - 124/68)  RR: 18 (02-15-23 @ 11:55) (17 - 18)  SpO2: 97% (02-15-23 @ 11:55) (97% - 100%)  Wt(kg): --  I&O's Summary    14 Feb 2023 07:01  -  15 Feb 2023 07:00  --------------------------------------------------------  IN: 0 mL / OUT: 890 mL / NET: -890 mL    15 Feb 2023 07:01  -  15 Feb 2023 18:50  --------------------------------------------------------  IN: 0 mL / OUT: 300 mL / NET: -300 mL        REVIEW OF SYSTEMS: denies fever, chills, SOB, palpitations, chest pain, abdominal pain, nausea, vomitting, diarrhea, constipation, dizziness    MEDICATIONS  (STANDING):  chlorhexidine 2% Cloths 1 Application(s) Topical <User Schedule>  enoxaparin Injectable 40 milliGRAM(s) SubCutaneous every 24 hours  levothyroxine 25 MICROGram(s) Oral daily  pantoprazole  Injectable 40 milliGRAM(s) IV Push two times a day  piperacillin/tazobactam IVPB.. 3.375 Gram(s) IV Intermittent every 8 hours  polyethylene glycol 3350 17 Gram(s) Oral every 12 hours  senna 2 Tablet(s) Oral at bedtime    MEDICATIONS  (PRN):  acetaminophen     Tablet .. 650 milliGRAM(s) Oral every 6 hours PRN Temp greater or equal to 38C (100.4F), Mild Pain (1 - 3)  ondansetron Injectable 4 milliGRAM(s) IV Push once PRN Nausea and/or Vomiting      PHYSICAL EXAM:  GENERAL: NAD, well-groomed, well-developed  HEAD:  Atraumatic, Normocephalic  EYES: EOMI, PERRLA, conjunctiva and sclera clear  ENMT: No tonsillar erythema, exudates, or enlargement; Moist mucous membranes, Good dentition, No lesions  NECK: Supple, No JVD, Normal thyroid  NERVOUS SYSTEM:  Alert & Oriented X3, Good concentration; Motor Strength 5/5 B/L upper and lower extremities; DTRs 2+ intact and symmetric  CHEST/LUNG: Clear to percussion bilaterally; No rales, rhonchi, wheezing, or rubs  HEART: Regular rate and rhythm; No murmurs, rubs, or gallops  ABDOMEN: Soft, Nontender, Nondistended; Bowel sounds present  EXTREMITIES:  2+ Peripheral Pulses, No clubbing, cyanosis, or edema  LYMPH: No lymphadenopathy noted  SKIN: No rashes or lesions  LABS:                        9.1    5.77  )-----------( 167      ( 15 Feb 2023 08:38 )             29.7     02-15    141  |  111<H>  |  15  ----------------------------<  94  4.6   |  23  |  1.21    Ca    8.5      15 Feb 2023 08:38  Phos  2.5     02-15  Mg     1.9     02-15    TPro  5.0<L>  /  Alb  2.0<L>  /  TBili  0.5  /  DBili  x   /  AST  17  /  ALT  14  /  AlkPhos  89  02-15        CAPILLARY BLOOD GLUCOSE

## 2023-02-15 NOTE — PROGRESS NOTE ADULT - PROBLEM SELECTOR PLAN 5
- Noted to have erythematous, tender patch on proximal left forearm  - Patient on ceftriaxone, appropriate coverage  - Improving
Stable
Held home meds due to bleeding.  Currently normotensive  Consider re-starting home antihypertensives post-ERCP or if prolonged hypertension
Stable
- Noted to have erythematous, tender patch on proximal left forearm  - Patient on ceftriaxone, appropriate coverage  - Improving

## 2023-02-15 NOTE — PROGRESS NOTE ADULT - PROBLEM SELECTOR PLAN 3
Hx of Liver cirhosis  on propanolol, lasix, and spironolactone at home. hold due to low BP   CT abdomen stable
No overt signs of bleeding, most likely anemic iso progression of liver disease. [facility] hepatology outpatient. See above.
Hx of Liver cirhosis  on propanolol, lasix, and spironolactone at home. hold due to low BP   CT abdomen stable
Hx of Liver cirhosis  on propanolol, lasix, and spironolactone at home. hold due to low BP   CT abdomen stable
Hypotensive secondary to GI hemorrhage during admission  Resolved with PRBC transfusions (3 total PRBC this admission).  Hemodynamically stable.  resolved
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  Advanced to regular diet per GI.   Pantoprazole infusion switched to IVP q12h
Hx of Liver cirhosis  on propanolol, lasix, and spironolactone at home. hold due to low BP   CT abdomen stable
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  Advanced to regular diet per GI.   Pantoprazole infusion switched to IVP q12h

## 2023-02-15 NOTE — PROGRESS NOTE ADULT - PROBLEM SELECTOR PLAN 8
Continue to monitor platelet count.   75> 146   improving   monitor CBC
- hx of hypothyroidism   - 25mg oral synthroid   - c/w home med
- hx of hypothyroidism   - 25mg oral synthroid   - c/w home med

## 2023-02-15 NOTE — PROGRESS NOTE ADULT - PROBLEM SELECTOR PLAN 9
holding DVT prophylaxis for ERCP
Continue to monitor platelet count.   75> 146   improving   monitor CBC
Continue to monitor platelet count.   75> 146   improving   monitor CBC

## 2023-02-15 NOTE — PROGRESS NOTE ADULT - PROBLEM SELECTOR PROBLEM 1
Abdominal pain
Abdominal pain
Cholelithiasis with cholangitis
Obstruction of biliary stent
Abdominal pain
Obstruction of biliary stent
Abdominal pain
Obstruction of biliary stent

## 2023-02-15 NOTE — PROGRESS NOTE ADULT - PROBLEM SELECTOR PLAN 7
Baseline normal creatinine/BUN (1.12/11)  Likely due to hypotension and anemia   Back to baseline creatinine; BUN slightly elevated  resolved
- hx of hypothyroidism   - 25mg oral synthroid   - c/w home med
Baseline normal creatinine/BUN (1.12/11)  Likely due to hypotension and anemia   Back to baseline creatinine; BUN slightly elevated  resolved

## 2023-02-15 NOTE — PROGRESS NOTE ADULT - PROBLEM SELECTOR PLAN 4
Follows hepatology outpatient. See above.    This note and its recommendations herein are preliminary until such time as cosigned by an attending.    GI will sign off at this time.  Thank you for involving us in the care of MR. Anthony Florez.  Please re-consult GI PRN.
Hb 8.6  f/u anemia panel
Hypotensive secondary to GI hemorrhage during admission  Resolved with PRBC transfusions (3 total PRBC this admission).  Hemodynamically stable.  resolved
Hypotensive secondary to GI hemorrhage during admission  Resolved with PRBC transfusions (3 total PRBC this admission).  Hemodynamically stable.  resolved
- Noted to have erythematous, tender patch on proximal left forearm  - Patient on ceftriaxone, appropriate coverage  - Improving

## 2023-02-15 NOTE — PROGRESS NOTE ADULT - PROBLEM SELECTOR PROBLEM 5
Cellulitis
Acute kidney injury superimposed on CKD
HTN (hypertension)
Cellulitis

## 2023-02-15 NOTE — PROGRESS NOTE ADULT - PROBLEM SELECTOR PROBLEM 2
Hyperkalemia
Hyperkalemia
Obstruction of biliary stent
Hyperkalemia
UGIB (upper gastrointestinal bleed)
Cholangitis
Hyperkalemia
Cholangitis

## 2023-02-15 NOTE — PROGRESS NOTE ADULT - SUBJECTIVE AND OBJECTIVE BOX
PGY-1 Progress Note discussed with attending    PAGER #: [[phone]] TILL 5:00 PM  PLEASE CONTACT ON CALL TEAM:  - On Call Team (Please refer to Yonny) FROM 5:00 PM - 8:30PM  - Nightfloat Team FROM 8:30 -7:30 AM    CHIEF COMPLAINT & BRIEF HOSPITAL COURSE:    INTERVAL HPI/OVERNIGHT EVENTS:       REVIEW OF SYSTEMS:  CONSTITUTIONAL: No fever, weight loss, or fatigue  RESPIRATORY: No cough, wheezing, chills or hemoptysis; No shortness of breath  CARDIOVASCULAR: No chest pain, palpitations, dizziness, or leg swelling  GASTROINTESTINAL: No abdominal pain. No nausea, vomiting, or hematemesis; No diarrhea or constipation. No melena or hematochezia.  GENITOURINARY: No dysuria or hematuria, urinary frequency  NEUROLOGICAL: No headaches, memory loss, loss of strength, numbness, or tremors  SKIN: No itching, burning, rashes, or lesions     Vital Signs Last 24 Hrs  T(C): 36.6 (15 Feb 2023 05:58), Max: 37 (14 Feb 2023 20:12)  T(F): 97.9 (15 Feb 2023 05:58), Max: 98.6 (14 Feb 2023 20:12)  HR: 60 (15 Feb 2023 05:58) (60 - 95)  BP: 124/68 (15 Feb 2023 05:58) (117/73 - 124/68)  BP(mean): --  RR: 17 (15 Feb 2023 05:58) (17 - 18)  SpO2: 100% (15 Feb 2023 05:58) (99% - 100%)    Parameters below as of 14 Feb 2023 20:12  Patient On (Oxygen Delivery Method): room air        PHYSICAL EXAMINATION:  GENERAL: NAD, well built  HEAD:  Atraumatic, Normocephalic  EYES:  conjunctiva and sclera clear  NECK: Supple, No JVD, Normal thyroid  CHEST/LUNG: Clear to auscultation. Clear to percussion bilaterally; No rales, rhonchi, wheezing, or rubs  HEART: Regular rate and rhythm; No murmurs, rubs, or gallops  ABDOMEN: Soft, Nontender, Nondistended; Bowel sounds present  NERVOUS SYSTEM:  Alert & Oriented X3,    EXTREMITIES:  2+ Peripheral Pulses, No clubbing, cyanosis, or edema  SKIN: warm dry                          9.1    5.77  )-----------( 167      ( 15 Feb 2023 08:38 )             29.7     02-15    141  |  111<H>  |  15  ----------------------------<  94  4.6   |  23  |  1.21    Ca    8.5      15 Feb 2023 08:38  Phos  2.5     02-15  Mg     1.9     02-15    TPro  5.0<L>  /  Alb  2.0<L>  /  TBili  0.5  /  DBili  x   /  AST  17  /  ALT  14  /  AlkPhos  89  02-15    LIVER FUNCTIONS - ( 15 Feb 2023 08:38 )  Alb: 2.0 g/dL / Pro: 5.0 g/dL / ALK PHOS: 89 U/L / ALT: 14 U/L DA / AST: 17 U/L / GGT: x                   CAPILLARY BLOOD GLUCOSE      RADIOLOGY & ADDITIONAL TESTS:                   PGY-1 Progress Note discussed with attending    PAGER #: [[phone]] TILL 5:00 PM  PLEASE CONTACT ON CALL TEAM:  - On Call Team (Please refer to Yonny) FROM 5:00 PM - 8:30PM  - Nightfloat Team FROM 8:30 -7:30 AM      INTERVAL HPI/OVERNIGHT EVENTS: No overnight events. Patient seen and examined at bedside. Denies any new complaints      REVIEW OF SYSTEMS:  CONSTITUTIONAL: No fever, weight loss, or fatigue  RESPIRATORY: No cough, wheezing, chills or hemoptysis; No shortness of breath  CARDIOVASCULAR: No chest pain, palpitations, dizziness, or leg swelling  GASTROINTESTINAL: No abdominal pain. No nausea, vomiting, or hematemesis; No diarrhea or constipation. No melena or hematochezia.  GENITOURINARY: No dysuria or hematuria, urinary frequency  NEUROLOGICAL: No headaches, memory loss, loss of strength, numbness, or tremors  SKIN: No itching, burning, rashes, or lesions     Vital Signs Last 24 Hrs  T(C): 36.6 (15 Feb 2023 05:58), Max: 37 (14 Feb 2023 20:12)  T(F): 97.9 (15 Feb 2023 05:58), Max: 98.6 (14 Feb 2023 20:12)  HR: 60 (15 Feb 2023 05:58) (60 - 95)  BP: 124/68 (15 Feb 2023 05:58) (117/73 - 124/68)  BP(mean): --  RR: 17 (15 Feb 2023 05:58) (17 - 18)  SpO2: 100% (15 Feb 2023 05:58) (99% - 100%)    Parameters below as of 14 Feb 2023 20:12  Patient On (Oxygen Delivery Method): room air    PHYSICAL EXAMINATION:  GENERAL: NAD, well built  HEAD:  Atraumatic, Normocephalic  EYES:  conjunctiva and sclera clear  NECK: Supple, No JVD, Normal thyroid  CHEST/LUNG: Clear to auscultation. Clear to percussion bilaterally; No rales, rhonchi, wheezing, or rubs  HEART: Regular rate and rhythm; No murmurs, rubs, or gallops  ABDOMEN: Soft, Nontender, Nondistended; Bowel sounds present  NERVOUS SYSTEM:  Alert & Oriented X3,    EXTREMITIES:  2+ Peripheral Pulses, No clubbing, cyanosis, or edema  SKIN: warm                                      9.1    5.77  )-----------( 167      ( 15 Feb 2023 08:38 )             29.7     02-15    141  |  111<H>  |  15  ----------------------------<  94  4.6   |  23  |  1.21    Ca    8.5      15 Feb 2023 08:38  Phos  2.5     02-15  Mg     1.9     02-15    TPro  5.0<L>  /  Alb  2.0<L>  /  TBili  0.5  /  DBili  x   /  AST  17  /  ALT  14  /  AlkPhos  89  02-15    LIVER FUNCTIONS - ( 15 Feb 2023 08:38 )  Alb: 2.0 g/dL / Pro: 5.0 g/dL / ALK PHOS: 89 U/L / ALT: 14 U/L DA / AST: 17 U/L / GGT: x                   CAPILLARY BLOOD GLUCOSE      RADIOLOGY & ADDITIONAL TESTS:

## 2023-02-15 NOTE — PROGRESS NOTE ADULT - ASSESSMENT
covering for dr Cooley    CBD STONES/STENTS, gibLEED, ANEMIA   seen and examiend vsstable afebrile physical done ok  denies abd pain  nausea vomiting    admitted for UGI bLeed  esophageal varieces/ cirrhosis  cbd stones  removed  anemia   vs stable afebrile physical done  alert awake  abd soft bs nml nt nd   heart  , lungs ok   anemia  GI bleed  watch hgb  PPI   gi on case   biliary stents  gI   watch hgb

## 2023-02-15 NOTE — PROGRESS NOTE ADULT - PROBLEM SELECTOR PLAN 2
ERCP shows cholangitis  start zosyn   ID Dr. Peters ERCP shows cholangitis  c/w zosyn   ID Dr. Peters

## 2023-02-15 NOTE — PROGRESS NOTE ADULT - PROBLEM SELECTOR PROBLEM 3
Liver cirrhosis
Anemia
Liver cirrhosis
Hemorrhagic shock
UGIB (upper gastrointestinal bleed)
UGIB (upper gastrointestinal bleed)

## 2023-02-16 NOTE — DISCHARGE NOTE NURSING/CASE MANAGEMENT/SOCIAL WORK - PATIENT PORTAL LINK FT
You can access the FollowMyHealth Patient Portal offered by [facility] by registering at the following website: [email or web address]. By joining [facility]’s FollowMyHealth portal, you will also be able to view your health information using other applications (apps) compatible with our system.

## 2023-02-16 NOTE — DISCHARGE NOTE PROVIDER - PROVIDER TOKENS
PROVIDER:[TOKEN:[[id number]],FOLLOWUP:[2 weeks]],PROVIDER:[TOKEN:[[id number]],FOLLOWUP:[2 months]]

## 2023-02-16 NOTE — PROGRESS NOTE ADULT - REASON FOR ADMISSION
abdominal pain
GI bleed
abdominal pain

## 2023-02-16 NOTE — DISCHARGE NOTE PROVIDER - NSDCCPCAREPLAN_GEN_ALL_CORE_FT
PRINCIPAL DISCHARGE DIAGNOSIS  Diagnosis: Obstruction of biliary stent  Assessment and Plan of Treatment: During your admission you had an endoscopy for a GI bleed that showed your old biliary stent, whcih was noted not to be draining therefore it was taken out. Subsequently you had an ERCP where it showed inflammation of the biliary duct system and pus was drained. You had a new stent placed. PLEASE FOLLOW UP WITH GASTROENTEROLOGY DR. GRACE ALEXANDRE FOR STENT FOLLOW UP AND REMOVAL IN 6 WEEKS AFTER DISCHARGE.      SECONDARY DISCHARGE DIAGNOSES  Diagnosis: Cholangitis  Assessment and Plan of Treatment: You were found to have an inflamamtion in the biliary duct system, pus was drianed and you had a new stent placed. You were seen by an infectious diseases physician who started you on IV antibiotics, you will be discharged on oral antibiotics PLEASE TAKE CEFEPIME 500MG TWICE A DAY AND FLAGYL 500MG THREE TIMES A DAY FOR SEVEN DAYS TO COMEPLETE YOUR ANTIBIOTIC DOSE. FOLLOW UP WITH YOUR PCP AND GASTROENTEROLOGY DR. ALEXANDRE UPON DISCHARGE.    Diagnosis: UGIB (upper gastrointestinal bleed)  Assessment and Plan of Treatment: You presented to the hospital with black stools, you were intially in [facility] ICU. You had a CT angiogram of the abdomen and pelvis that showed no signs of bleeding. You were transfused a total of three units. Your home medication aspirin and plavix were held. You had an Endoscopythat showed  varicies in esophagus, no active bleeding. You likley have some arteriovenous malformations which is an abnormal tangle of blood vessels that are prone to bleeding in the small bowel. FOLLOW UP WITH GASTROENTEROLOGY DR. ALEXANDRE UPON DISCHARGE.    Diagnosis: Hemorrhagic shock  Assessment and Plan of Treatment: On admission you were hypotensive secondary to your gastrointestinal bleed, you were in the ICU where  you received three units of blood and IV fluids that improved your blood pressure back to normal.    Diagnosis: Cellulitis  Assessment and Plan of Treatment: You had a skin infection in the left forearm that was treated with IV antibiotics. Please complete your oral antibiotic course. Follow up with you rPCP within one week of discharge for furhter assessment and monitoring.    Diagnosis: HTN (hypertension)  Assessment and Plan of Treatment: You have a history of Hypertension.   Your blood pressure target is 120-140/80-90, maintain healthy lifestyle, low salt diet, avoid fatty food,   Notify your doctor if you have any of the following symptoms:   (Dizziness, Lightheadedness, Blurry vision, Headache, Chest pain, Shortness of breath.)  DURIGN YOUR ADMISSION YOUR BLOOD PRESSURE MEDICATION WERE HELD DUE TO LOW BLOOD PRESSURE AND GI BLEED, YOUR BLOOD PRESSURE WAS WELL OPTIMZED DURING YOUR HOSPITAL STAY.  FOLLOW UP WITH YOUR PCP TO RESUME MEDICATION AS NEEDED.    Diagnosis: Acute kidney injury superimposed on CKD  Assessment and Plan of Treatment: You were diagnosed with acute kidney injury. Your creatinine was found to be elevated You were treated with IV fluids to help normalize. You are recommended to follow up with your PCP in a week from discharge.    Diagnosis: Hypothyroid  Assessment and Plan of Treatment: You have a history of hypothyroidism your home medication synthrouid was contiued during you hospitalization.    Diagnosis: Thrombocytopenia  Assessment and Plan of Treatment: During your hospitalization you were found to have low platelets. You pplatelet count was monitored and it normalized. Please follow up with your PCP within one week of discharge.

## 2023-02-16 NOTE — DISCHARGE NOTE PROVIDER - CARE PROVIDER_API CALL
DENNIS SPARROW  Internal Medicine  [address] [place] [address]  Phone: [phone]  Fax: [phone]  Follow Up Time: 2 weeks    Nishant Galdamez)  Gastroenterology  [address] [facility], [address] [place] [address], [facility] [place] [address]  Phone: [phone]  Fax: [phone]  Follow Up Time: 2 months

## 2023-02-16 NOTE — DISCHARGE NOTE PROVIDER - HOSPITAL COURSE
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      Obstruction of biliary stent  Patient has  an old biliary stent noted on EGD to not be draining and was removed. He had an  ERCP  on 2/13  that showed cholangitis with sludge , stone fragments and pus drained from duct. A 10 fr 5cm stent placed.  GI follow up with Dr. Galdamez in 6 weeks.    Cholangitis  ·  Plan: ERCP shows cholangitis  c/w zosyn   ID Dr. Peters.    UGIB (upper gastrointestinal bleed)  ·  Plan: Melena prior to arrival  CT angio abd/ pelvis without bleeding  Total of 3 units PRBC this admission; most recent 2/10  Holding aspirin/Plavix   2/9 EGD with non-bleeding varices in the lower third of the esophagus.  Advanced to regular diet per GI.   Pantoprazole infusion switched to IVP q12h.     Hemorrhagic shock  ·  Plan: Hypotensive secondary to GI hemorrhage during admission  Resolved with PRBC transfusions (3 total PRBC this admission).  Hemodynamically stable.  resolved.    Cellulitis  ·  Plan: - Noted to have erythematous, tender patch on proximal left forearm  - Patient on ceftriaxone, appropriate coverage  - Improving.     HTN (hypertension)  ·  Plan: Held home meds due to bleeding.  Currently normotensive  Consider re-starting home antihypertensives post-ERCP or if prolonged hypertension.     KAY (acute kidney injury)  ·  Plan: Baseline normal creatinine/BUN (1.12/11)  Likely due to hypotension and anemia   Back to baseline creatinine; BUN slightly elevated  resolved.    Hypothyroid  ·  Plan: - hx of hypothyroidism   - 25mg oral synthroid   - c/w home med.     Thrombocytopenia  ·  Plan: Continue to monitor platelet count.   75> 146   improving   monitor CBC.     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      Obstruction of biliary stent  Patient has  an old biliary stent noted on EGD to not be draining and was removed. He had an  ERCP  on 2/13  that showed cholangitis with sludge , stone fragments and pus drained from duct. A 10 fr 5cm stent placed.  GI follow up with Dr. Galdamez in 6 weeks for stent removal. Patient was placed on zosyn while inpatient and will be discharged on cefepime and   flagyl.     Cholangitis  ERCP shows cholangitis. Seen by ID, placed on zosyn and will be discharged on cefepime 500 BID and Flagyl 500mg TID for 7 days.     UGIB (upper gastrointestinal bleed)  Patient initially presented with melena prior to arrival. He had a CT angiogram of the abdomen and   pelvis that showed no signs of bleeding. He was transfused a total of three units. Home medication aspirin and plavix were held. He had an EGD on 2/9 that showed non bleeding varicues in lower third of the esophagus.   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     Hemorrhagic shock  Patient was hypotensive on admission secondary to GI hemorrhage during admission, received 3 PRBCs in ICU.      Cellulitis  Patient was noted to have erythematous, tender patch on proximal left forearm. He was on ceftriaxone, appropriate coverage.      HTN (hypertension)  Home medication were held in setting of GI bleed. BP stable throughout hospitalization.      KAY on CKD  Likely due to hypotension and anemia. Back to baseline creatinine    Hypothyroid  Patient has a history of hypothyroidism, continue with home medication  25mg oral synthroid      Thrombocytopenia  low platelet count on admission, resolved.

## 2023-02-16 NOTE — DISCHARGE NOTE NURSING/CASE MANAGEMENT/SOCIAL WORK - FLU SEASON?
[address] Progress Note      Duane A Carota     : 1961    DATE OF VISIT:  2021    Subjective:     Duane A Carota is a 61 y.o. male who has a chief complaint of a pressure ulcer located on the bilateral foot. Seen with caretaker. Patient has profound MR and is nonverbal.   Per caretakers he did not tolerate offloading boots. Pushes with his feet a lot while he is resting. Caretaker states he did OK with football dressing on the left. Does seem to be in pain at times. Mr. 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 He has a past surgical history that includes Cataract removal; Colonoscopy (2016); craniotomy (Right, 2019); TURP (N/A, 2020); Colonoscopy (2020); Colonoscopy (N/A, 2020); Colonoscopy (N/A, 2021); Colonoscopy (N/A, 2021); Upper gastrointestinal endoscopy (N/A, 2021); and Gastrostomy tube placement. His Family history is unknown by patient. Mr. Coleman Wood reports that he has never smoked. He has never used smokeless tobacco. He reports that he does not drink alcohol and does not use drugs. His current medication list consists of LORazepam, Nutritional Supplements, QUEtiapine, acetaminophen, lansoprazole, levETIRAcetam, melatonin, sennosides-docusate sodium, and tamsulosin. Allergies: Penicillins, Benadryl [diphenhydramine], Clonidine derivatives, and Tetracyclines & related    Pertinent items from the review of systems are discussed in the HPI; the remainder of the ROS was reviewed and is negative. Objective:      Wt 94 lb 6.4 oz (42.8 kg)   BMI 21.16 kg/m²      General Appearance: alert and oriented to person, place and time, well Yes... developed and well- nourished, in no acute distress  Skin: warm and dry, no rash or erythema  Head: normocephalic and atraumatic  Eyes: pupils equal, round, and reactive to light, extraocular eye movements intact, conjunctivae normal  ENT: tympanic membrane, external ear and ear canal normal bilaterally, nose without deformity, nasal mucosa and turbinates normal without polyps  Neck: supple and non-tender without mass, no thyromegaly or thyroid nodules, no cervical lymphadenopathy  Pulmonary/Chest: clear to auscultation bilaterally- no wheezes, rales or rhonchi, normal air movement, no respiratory distress  Cardiovascular: normal rate, regular rhythm, normal S1 and S2, no murmurs, rubs, clicks, or gallops, distal pulses intact, no carotid bruits  Abdomen: soft, non-tender, non-distended, normal bowel sounds, no masses or organomegaly. Dorsalis pedis pulse left palpable  Posterior tibial pulse left palpable  Dorsalis pedis pulse right palpable  Posterior tibial pulse right palpable      Ulcer on right heel epithelialized. No drainage noted. No signs of infection noted. Ulcer on the left heel with mild fibrotic tissue, red granulation tissue, mild serous drainage, no hyperkeratotic rim, no undermining, no tunneling, no purulence, no malodor, no eschar, no periwound maceration, mild periwound erythema, mild edema, no crepitus, no increase in skin temperature, ulcer probes to soft tissue only       Today's ulcer measurements are in the wound documentation flowsheet.      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.0    Atrial fibrillation with RVR ([facility] I48.91    Acute cystitis with hematuria N30.01    Altered mental status R41.82    Brain herniation ([facility]) G93.5    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 ([facility] S06.5X9S    Constipation K59.00    Acute urinary retention R33.8    Atrial fibrillation, chronic ([facility]) I48.20    History of subdural hematoma Z86.79    Pneumonia J18.9    COVID-19 U07.1    Pulmonary infiltrates R91.8    Thrombocytopenia ([facility]) D69.6    RUBEN (acute kidney injury) ([facility] N17.9    Sepsis ([facility]) A41.9    Seizure disorder ([facility]) G40.909    PAF (paroxysmal atrial fibrillation) ([facility]) I48.0    Hypothermia T68. XXXA    Pressure ulcer of left heel, stage 2 ([facility]) S79.285       Assessment of today's active condition(s): pressure ulcer bilateral heel. Factors contributing to occurrence and/or persistence of the chronic ulcer include chronic pressure. Sharp debridement is not indicated today, based upon the exam findings in the ulcer(s) above. Discharge plan:     Treatment in the wound care center today:  . Home treatment: good handwashing before and after any dressing changes. Cleanse ulcer with saline or soap & water before dressing change. May use Vaseline (petrolatum), Aquaphor, Aveeno, CeraVe, Cetaphil, Eucerin, Lubriderm, etc for dry skin. Dressing type for home: Mepilex border to the right heel for next week to allow skin to strengthen. Well padded dry dressing (football dressing) to the left foot to remain intact for 1 week. If gets dirty or soiled then can remove and apply mepilex border. Written discharge instructions given to patient. Offload ulcer(s) as directed. Elevate leg(s) as directed. Follow up in 1 week. Try to avoid trauma and pressure to the heels. Consider Ativan prior to appointment to decrease agitation.          Electronically signed by General Leandro DPM on 11/1/2021 at 3:28 PM.

## 2023-02-16 NOTE — DISCHARGE NOTE PROVIDER - DETAILS OF MALNUTRITION DIAGNOSIS/DIAGNOSES
This patient has been assessed with a concern for Malnutrition and was treated during this hospitalization for the following Nutrition diagnosis/diagnoses:     -  02/08/2023: Severe protein-calorie malnutrition

## 2023-02-16 NOTE — PROGRESS NOTE ADULT - SUBJECTIVE AND OBJECTIVE BOX
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 Patient states that he has been chronic abdominal pain but since yesterday he has been having constipation with dark colored bowel movements. patient has not been able to eat over the last few months. His diet has consisted of mostly ensure. Patient underwent ERCP on 4/22 and was found to have extensive choledocholithiasis and biliary purulence consistent with cholangitis and occluded biliary stent. Stent removed and replaced with new 10Fr/7cm plastic biliary stent. Cholangioscopy was not performed given active cholangitis. Patient considered poor surgical candidate for cholecystectomy. He was d/c with repeat ERCP in 2-3 months for stent removal and possible cholangioscopy. Patient denies any vomiting or any other symptoms including fevers, chills, headaches dizziness, chest pain, SOB, cough, hematuria, dysuria.     In ED VS: T 97.7 HR 85 T 97.6 RR 18 Spo2 96%RA  K 6.5  Cr 1.53  Hb 8.6  CT angio abd/ pelvis shows Cirrhosis.Common duct stent with markedly dilated common duct containing calculi and debris similar in appearance to 04/20/2022.  s/p 2g Ca 10u Insulin in E D      REVIEW OF SYSTEMS: denies fever, chills, SOB, palpitations, chest pain, abdominal pain, nausea, vomitting, diarrhea, constipation, dizziness    MEDICATIONS  (STANDING):  chlorhexidine 2% Cloths 1 Application(s) Topical <User Schedule>  enoxaparin Injectable 40 milliGRAM(s) SubCutaneous every 24 hours  levothyroxine 25 MICROGram(s) Oral daily  pantoprazole  Injectable 40 milliGRAM(s) IV Push two times a day  piperacillin/tazobactam IVPB.. 3.375 Gram(s) IV Intermittent every 8 hours  polyethylene glycol 3350 17 Gram(s) Oral every 12 hours  senna 2 Tablet(s) Oral at bedtime    MEDICATIONS  (PRN):  acetaminophen     Tablet .. 650 milliGRAM(s) Oral every 6 hours PRN Temp greater or equal to 38C (100.4F), Mild Pain (1 - 3)  ondansetron Injectable 4 milliGRAM(s) IV Push once PRN Nausea and/or Vomiting      PHYSICAL EXAM:  GENERAL: NAD, well-groomed, well-developed  HEAD:  Atraumatic, Normocephalic  EYES: EOMI, PERRLA, conjunctiva and sclera clear  ENMT: No tonsillar erythema, exudates, or enlargement; Moist mucous membranes, Good dentition, No lesions  NECK: Supple, No JVD, Normal thyroid  NERVOUS SYSTEM:  Alert & Oriented X3, Good concentration; Motor Strength 5/5 B/L upper and lower extremities; DTRs 2+ intact and symmetric  CHEST/LUNG: Clear to percussion bilaterally; No rales, rhonchi, wheezing, or rubs  HEART: Regular rate and rhythm; No murmurs, rubs, or gallops  ABDOMEN: Soft, Nontender, Nondistended; Bowel sounds present  EXTREMITIES:  2+ Peripheral Pulses, No clubbing, cyanosis, or edema  LYMPH: No lymphadenopathy noted  SKIN: No rashes or lesions  LABS:                        9.1    5.77  )-----------( 167      ( 15 Feb 2023 08:38 )             29.7     02-15    141  |  111<H>  |  15  ----------------------------<  94  4.6   |  23  |  1.21    Ca    8.5      15 Feb 2023 08:38  Phos  2.5     02-15  Mg     1.9     02-15    TPro  5.0<L>  /  Alb  2.0<L>  /  TBili  0.5  /  DBili  x   /  AST  17  /  ALT  14  /  AlkPhos  89  02-15        CAPILLARY BLOOD GLUCOSE

## 2023-02-16 NOTE — PROGRESS NOTE ADULT - ASSESSMENT
covering for dr Cooley    CBD STONES/STENTS, gibLEED, ANEMIA   seen and examiend vsstable afebrile physical done ok  denies abd pain  nausea vomiting    admitted for UGI bLeed  esophageal varieces/ cirrhosis  cbd stones  removed  anemia   vs stable afebrile physical done  alert awake  abd soft bs nml nt nd   heart  , lungs ok   anemia  GI bleed  watch hgb  PPI   gi on case   biliary stents  gI   watch hgb     D/C planning

## 2023-02-16 NOTE — PROGRESS NOTE ADULT - PROVIDER SPECIALTY LIST ADULT
Critical Care
Critical Care
Hospitalist
Internal Medicine
Internal Medicine
Critical Care
Critical Care
Internal Medicine
Critical Care
Hospitalist
Internal Medicine
Gastroenterology
Hospitalist
Hospitalist
Internal Medicine

## 2023-02-16 NOTE — DISCHARGE NOTE NURSING/CASE MANAGEMENT/SOCIAL WORK - NSDCPEFALRISK_GEN_ALL_CORE
For information on Fall & Injury Prevention, visit: [email or web address] OR  [email or web address] OR  https://www.cdc.gov/steadi/patient.html

## 2023-02-16 NOTE — PROGRESS NOTE ADULT - NUTRITIONAL ASSESSMENT
This patient has been assessed with a concern for Malnutrition and has been determined to have a diagnosis/diagnoses of Severe protein-calorie malnutrition.    This patient is being managed with:   Diet NPO after Midnight-     NPO Start Date: 12-Feb-2023   NPO Start Time: 23:59  Entered: Feb 12 2023  7:33AM    Diet Regular-  Supplement Feeding Modality:  Oral  Ensure Enlive Cans or Servings Per Day:  1       Frequency:  Two Times a day  Entered: Feb 11 2023  3:13PM    
This patient has been assessed with a concern for Malnutrition and has been determined to have a diagnosis/diagnoses of Severe protein-calorie malnutrition.    This patient is being managed with:   Diet NPO after Midnight-     NPO Start Date: 12-Feb-2023   NPO Start Time: 23:59  Entered: Feb 12 2023  7:33AM    Diet Regular-  Supplement Feeding Modality:  Oral  Ensure Enlive Cans or Servings Per Day:  1       Frequency:  Two Times a day  Entered: Feb 11 2023  3:13PM    
This patient has been assessed with a concern for Malnutrition and has been determined to have a diagnosis/diagnoses of Severe protein-calorie malnutrition.    This patient is being managed with:   Diet Regular-  Supplement Feeding Modality:  Oral  Ensure Enlive Cans or Servings Per Day:  1       Frequency:  Two Times a day  Entered: Feb 11 2023  3:13PM    
This patient has been assessed with a concern for Malnutrition and has been determined to have a diagnosis/diagnoses of Severe protein-calorie malnutrition.    This patient is being managed with:   Diet Clear Liquid-  Entered: Feb 10 2023 10:37PM    
This patient has been assessed with a concern for Malnutrition and has been determined to have a diagnosis/diagnoses of Severe protein-calorie malnutrition.    This patient is being managed with:   Diet NPO after Midnight-     NPO Start Date: 12-Feb-2023   NPO Start Time: 23:59  Entered: Feb 12 2023  7:33AM    Diet Regular-  Supplement Feeding Modality:  Oral  Ensure Enlive Cans or Servings Per Day:  1       Frequency:  Two Times a day  Entered: Feb 11 2023  3:13PM    
This patient has been assessed with a concern for Malnutrition and has been determined to have a diagnosis/diagnoses of Severe protein-calorie malnutrition.    This patient is being managed with:   Diet Clear Liquid-  Entered: Feb 14 2023  8:50AM    
This patient has been assessed with a concern for Malnutrition and has been determined to have a diagnosis/diagnoses of Severe protein-calorie malnutrition.    This patient is being managed with:   Diet Regular-  Supplement Feeding Modality:  Oral  Ensure Clear Cans or Servings Per Day:  1       Frequency:  Three Times a day  Entered: Feb 15 2023  8:40AM    
This patient has been assessed with a concern for Malnutrition and has been determined to have a diagnosis/diagnoses of Severe protein-calorie malnutrition.    This patient is being managed with:   Diet Regular-  Supplement Feeding Modality:  Oral  Ensure Clear Cans or Servings Per Day:  1       Frequency:  Three Times a day  Entered: Feb 15 2023  8:40AM    
This patient has been assessed with a concern for Malnutrition and has been determined to have a diagnosis/diagnoses of Severe protein-calorie malnutrition.    This patient is being managed with:   Diet Clear Liquid-  Entered: Feb 10 2023 10:37PM    
This patient has been assessed with a concern for Malnutrition and has been determined to have a diagnosis/diagnoses of Severe protein-calorie malnutrition.    This patient is being managed with:   Diet Regular-  Supplement Feeding Modality:  Oral  Ensure Clear Cans or Servings Per Day:  1       Frequency:  Three Times a day  Entered: Feb 15 2023  8:40AM    
This patient has been assessed with a concern for Malnutrition and has been determined to have a diagnosis/diagnoses of Severe protein-calorie malnutrition.    This patient is being managed with:   Diet NPO-  Entered: Feb 8 2023  7:55AM

## 2023-02-16 NOTE — DISCHARGE NOTE PROVIDER - NSDCMRMEDTOKEN_GEN_ALL_CORE_FT
Aspir 81 oral delayed release tablet: 1 tab(s) orally once a day  Colace 100 mg oral capsule: 1 cap(s) orally 3 times a day  famotidine 20 mg oral tablet: orally once a day  ferrous sulfate 325 mg (65 mg elemental iron) oral tablet: 1 tab(s) orally once a day (at bedtime)  furosemide 20 mg oral tablet: 1 tab(s) orally once a day  lactulose 10 g/15 mL oral solution:   levothyroxine 25 mcg (0.025 mg) oral tablet: 1 tab(s) orally once a day  Melatonin 1 mg oral tablet: 1 tab(s) orally once a day (at bedtime)  Plavix 75 mg oral tablet: 1 tab(s) orally once a day  propranolol 10 mg oral tablet: 1 tab(s) orally once a day  Senna 8.6 mg oral tablet: 2 tab(s) orally once a day (at bedtime)  spironolactone 25 mg oral tablet: 1 tab(s) orally once a day   cefuroxime 500 mg oral tablet: 1 tab(s) orally 2 times a day   Colace 100 mg oral capsule: 1 cap(s) orally 3 times a day  famotidine 20 mg oral tablet: orally once a day  ferrous sulfate 325 mg (65 mg elemental iron) oral tablet: 1 tab(s) orally once a day (at bedtime)  lactulose 10 g/15 mL oral solution:   levothyroxine 25 mcg (0.025 mg) oral tablet: 1 tab(s) orally once a day  Melatonin 1 mg oral tablet: 1 tab(s) orally once a day (at bedtime)  metroNIDAZOLE 500 mg oral tablet: 1 tab(s) orally 3 times a day   polyethylene glycol 3350 oral powder for reconstitution: 17 gram(s) orally every 12 hours  Senna 8.6 mg oral tablet: 2 tab(s) orally once a day (at bedtime)  spironolactone 25 mg oral tablet: 1 tab(s) orally once a day

## 2023-04-11 PROBLEM — K80.50 CHOLEDOCHOLITHIASIS: Status: ACTIVE | Noted: 2023-01-01

## 2023-05-09 PROBLEM — Z98.890 HISTORY OF BILIARY STENT INSERTION: Status: ACTIVE | Noted: 2023-01-01

## 2023-05-09 NOTE — PHYSICAL EXAM

## 2023-05-14 NOTE — ASSESSMENT
[FreeTextEntry1] : This is an 83 year old male here for a post hospital discharge follow up of choledocholithiasis.\par Informed patient and wife, stent is at near its life span and should be removed without delays since they want to wait until grand daughters graduation on May 30th. \par \par My plan\par ERCP with stent removal\par PST clearance\par \par Risks, benefits, and alternatives of ERCP discussed at length with patient including but not limited to bleeding perforation, anesthesia related complication, aspiration, pancreatitis etc. Patient expressed understanding.\par \par

## 2023-05-14 NOTE — HISTORY OF PRESENT ILLNESS
[FreeTextEntry1] : This is an 83 year old male here for a post hospital discharge follow up of choledocholithiasis. PMH of CBD stone, ETOh cirrhosis, porcelain gallbladder. Patient presented to [facility] ED with cholangitis from old stent placed by Dr. Cho in 2021. Patient had his stent pulled by Dr. Lemus on 2/8/23. ON 2/1323 an ERCP by me showed pus with stones and replaced prior stent with 10Fr 5cm CBD stent. He had recent labs done with unremarkable LFTs. He denies any chills or fevers. Tolerating meals. No jaundice. Urine yellow. No other issues

## 2023-06-01 NOTE — ED ADULT NURSE NOTE - NSFALLUNIVINTERV_ED_ALL_ED
Bed/Stretcher in lowest position, wheels locked, appropriate side rails in place/Call bell, personal items and telephone in reach/Instruct patient to call for assistance before getting out of bed/chair/stretcher/Non-slip footwear applied when patient is off stretcher/[place] to call system/Physically safe environment - no spills, clutter or unnecessary equipment/Purposeful proactive rounding/Room/bathroom lighting operational, light cord in reach

## 2023-06-01 NOTE — ED ADULT NURSE NOTE - HOW OFTEN DO YOU HAVE A DRINK CONTAINING ALCOHOL?
Never Constitutional: NAD AAOx3  Eyes: PERRLA EOMI  Head: Normocephalic atraumatic  Mouth: MMM  Cardiac: regular rate   Resp: Lungs CTAB  GI: Abd s/nt/nd  Neuro: CN2-12 intact  Skin: +Surgical site c/d/i, No rashes

## 2023-06-01 NOTE — ED PROVIDER NOTE - IV ALTEPLASE DOOR HIDDEN
Final Anesthesia Post-op Assessment    Patient: Kaya Aparicio  Procedure(s) Performed: SERVICE TO GASTROENTEROLOGYEGDCOLONOSCOPY  Anesthesia type: MAC    Vitals Value Taken Time   Temp 36 °C (96.8 °F) 10/27/20 0815   Pulse 70 10/27/20 0845   Resp 16 10/27/20 0845   SpO2 100 % 10/27/20 0845   /79 10/27/20 0845         Patient Location: PACU Phase 1  Post-op Vital Signs:stable  Level of Consciousness: awake, alert, oriented and participates in exam  Respiratory Status: spontaneous ventilation  Cardiovascular stable and blood pressure returned to baseline  Hydration: euvolemic  Pain Management: adequately controlled  Handoff: Handoff to receiving nurse was performed and questions were answered  Vomiting: none   Nausea: None  Airway Patency:patent  Post-op Assessment: no complications, patient tolerated procedure well with no complications, evidence of recall, dentition within defined limits, moving all extremities and No Corneal Abrasion      No complications documented.    show

## 2023-06-01 NOTE — ED PROVIDER NOTE - NSICDXPASTMEDICALHX_GEN_ALL_CORE_FT
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

## 2023-06-01 NOTE — ED PROVIDER NOTE - CLINICAL SUMMARY MEDICAL DECISION MAKING FREE TEXT BOX
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 Galdamez, presents with an episode of bright red blood per rectum around 12 PM today, large amount of blood in the toilet as per the wife, and has had abdominal pain which started in the upper quadrants bilaterally and now has migrated to the left lower quadrant--labs, IVF, admit.

## 2023-06-01 NOTE — ED PROVIDER NOTE - PROGRESS NOTE DETAILS
I spoke with Pt's GI doctor, Dr. Galdamez.  He is aware for consult and will see Pt tomorrow.  Does not advise any additional intervention at this time. Dr. Hemphill informed for admission.

## 2023-06-01 NOTE — ED ADULT NURSE NOTE - NSICDXPASTMEDICALHX_GEN_ALL_CORE_FT
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

## 2023-06-01 NOTE — ED ADULT NURSE NOTE - OBJECTIVE STATEMENT
As per patient c/o abdominal pain beginning yesterday. Patient denies any nausea, vomiting and diarrhea. Patient reports bloody stools beginning today. Patient denies any chest pain. No acute distress noted.

## 2023-06-01 NOTE — ED PROVIDER NOTE - OBJECTIVE STATEMENT
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 Galdamez, presents with an episode of bright red blood per rectum around 12 PM today, large amount of blood in the toilet as per the wife, and has had abdominal pain which started in the upper quadrants bilaterally and now has migrated to the left lower quadrant.  No vomiting, no hematemesis.  He is also felt dizziness but denies any syncope episodes.  Not on any antiplatelets or anticoagulation.

## 2023-06-02 NOTE — H&P ADULT - PROBLEM SELECTOR PLAN 2
On spironolactone and lactulose  will hold lactulose for now  c/w spironolactone  f/u ammonia level On spironolactone and lactulose  will hold lactulose and spironolactone for now  f/u ammonia level On spironolactone and lactulose  will hold spironolactone for now  c/w lactulose  f/u ammonia level

## 2023-06-02 NOTE — H&P ADULT - NSICDXPASTMEDICALHX_GEN_ALL_CORE_FT
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

## 2023-06-02 NOTE — PROGRESS NOTE ADULT - PROBLEM SELECTOR PLAN 2
- On spironolactone and lactulose  - Spironolactone held on admission  - c/w lactulose  - Ammonia 50  - mental status at baseline  - LFTS WNL

## 2023-06-02 NOTE — PROGRESS NOTE ADULT - PROBLEM SELECTOR PLAN 1
- Hgb 10.1  - NPO for now  - continue IVF  - continue PPI 40 IV   - GI consulted Dr. Galdamez, f/u reccs

## 2023-06-02 NOTE — H&P ADULT - PROBLEM SELECTOR PLAN 1
Hgb 11.4, f/u repeat Hgb  LR 75cc/hr  NPO for now  PPI 40 IV qd  CTA A/P  GI consulted by ED - Dr. Galdamez Hgb 11.4, f/u repeat Hgb  LR 75cc/hr  NPO for now  PPI 40 IV qd in setting of previous UGIB  GI consulted by ED - Dr. Galdamez Hgb 11.4, f/u repeat Hgb  LR 50cc/hr  NPO for now  PPI 40 IV qd in setting of previous UGIB  GI consulted by ED - Dr. Galdamez

## 2023-06-02 NOTE — H&P ADULT - NSHPPOADEEPVENOUSTHROMB_GEN_A_CORE
Black spot near elbow. New, with irregular edge-onset x 1-2 months. Family hx of melonoma. Patient is offered appt and scheduled.   no

## 2023-06-02 NOTE — H&P ADULT - NSHPPHYSICALEXAM_GEN_ALL_CORE
LOS: 1d    VITALS:   T(C): 36.3 (06-02-23 @ 01:20), Max: 36.9 (06-01-23 @ 18:37)  HR: 65 (06-02-23 @ 01:20) (65 - 78)  BP: 110/62 (06-02-23 @ 01:20) (109/59 - 138/65)  RR: 17 (06-02-23 @ 01:20) (17 - 18)  SpO2: 96% (06-02-23 @ 01:20) (96% - 99%)    GENERAL: cachectic elderly male  HEAD:  Atraumatic, Normocephalic  EYES: EOMI, PERRLA, conjunctiva and sclera clear  ENT: Moist mucous membranes  NECK: Supple, No JVD  CHEST/LUNG: Clear to auscultation bilaterally; No rales, rhonchi, wheezing, or rubs. Unlabored respirations  HEART: Regular rate and rhythm; No murmurs, rubs, or gallops  ABDOMEN: BSx4; Soft, nontender, nondistended  EXTREMITIES:  2+ Peripheral Pulses, brisk capillary refill. No clubbing, cyanosis, or edema  NERVOUS SYSTEM:  A&Ox3, no focal deficits   SKIN: No rashes or lesions

## 2023-06-02 NOTE — PROGRESS NOTE ADULT - NS ATTEND AMEND GEN_ALL_CORE FT
Patient with hematochezia, history of multiple GI bleeds, stents. Patient still reports blood in his stool, Hgb trending from 11.4 to 10.1, will continue to monitor, however much improved compared to prior visit. GI evaluation, unlikely to need inpatient work-up. Continue to trend Hgb, if stable, possible discharge tomorrow.

## 2023-06-02 NOTE — PROGRESS NOTE ADULT - SUBJECTIVE AND OBJECTIVE BOX
NP Note discussed with  Primary Attending    Patient is a 83y old  Male who presents with a chief complaint of GIB (02 Jun 2023 03:38)      INTERVAL HPI/OVERNIGHT EVENTS: no acute events overnight    MEDICATIONS  (STANDING):  lactated ringers. 1000 milliLiter(s) (50 mL/Hr) IV Continuous <Continuous>  lactulose Syrup 10 Gram(s) Oral daily  levothyroxine 25 MICROGram(s) Oral daily  pantoprazole  Injectable 40 milliGRAM(s) IV Push daily    MEDICATIONS  (PRN):      __________________________________________________  REVIEW OF SYSTEMS:    CONSTITUTIONAL: No fever,   EYES: no acute visual disturbances  NECK: No pain or stiffness  RESPIRATORY: No cough; No shortness of breath  CARDIOVASCULAR: No chest pain, no palpitations  GASTROINTESTINAL: + mild abd pain + blood in stool  NEUROLOGICAL: No headache or numbness, no tremors  MUSCULOSKELETAL: No joint pain, no muscle pain  GENITOURINARY: no dysuria, no frequency, no hesitancy  PSYCHIATRY: no depression , no anxiety  ALL OTHER  ROS negative        Vital Signs Last 24 Hrs  T(C): 36.7 (02 Jun 2023 05:41), Max: 36.9 (01 Jun 2023 18:37)  T(F): 98 (02 Jun 2023 05:41), Max: 98.4 (01 Jun 2023 18:37)  HR: 56 (02 Jun 2023 05:41) (56 - 78)  BP: 138/63 (02 Jun 2023 05:41) (109/59 - 138/65)  BP(mean): --  RR: 18 (02 Jun 2023 05:41) (17 - 18)  SpO2: 100% (02 Jun 2023 05:41) (96% - 100%)    Parameters below as of 02 Jun 2023 05:41  Patient On (Oxygen Delivery Method): room air        ________________________________________________  PHYSICAL EXAM:  GENERAL: NAD  HEENT: Normocephalic  NECK : supple  CHEST/LUNG: Clear to auscultation bilaterally   HEART: S1 S2  RRR  ABDOMEN: Soft, + mild tenderness on palpation Nondistended; Bowel sounds present  EXTREMITIES: no edema  SKIN: warm and dry; no rash  NERVOUS SYSTEM:  Awake and alert; Oriented  to place, person and time   _________________________________________________  LABS:                        10.1   3.36  )-----------( 108      ( 02 Jun 2023 05:06 )             32.2     06-02    141  |  113<H>  |  27<H>  ----------------------------<  87  4.0   |  23  |  0.78    Ca    8.2<L>      02 Jun 2023 05:06  Phos  3.1     06-02  Mg     1.6     06-02    TPro  5.6<L>  /  Alb  2.2<L>  /  TBili  0.5  /  DBili  x   /  AST  20  /  ALT  16  /  AlkPhos  117  06-02    PT/INR - ( 01 Jun 2023 22:00 )   PT: 18.1 sec;   INR: 1.51 ratio         PTT - ( 01 Jun 2023 22:00 )  PTT:32.7 sec    CAPILLARY BLOOD GLUCOSE            RADIOLOGY & ADDITIONAL TESTS:    Imaging Personally Reviewed:  YES/NO    Consultant(s) Notes Reviewed:   YES/ No    Care Discussed with Consultants :     Plan of care was discussed with patient and /or primary care giver; all questions and concerns were addressed and care was aligned with patient's wishes.

## 2023-06-02 NOTE — ADVANCED PRACTICE NURSE CONSULT - RECOMMEDATIONS
-Clean all wounds with normal saline and apply skin prep to the surrounding skin  -Apply Collagenase ointment to the slough areas of the L. Sacrum, apply saline moistened gauze to the wound bed, and cover with a non-adherent dry dressing Daily PRN  -Elevate/float the patients heels using heel protectors and reposition the patient Q 2hrs using wedges or pillows

## 2023-06-02 NOTE — H&P ADULT - ATTENDING COMMENTS
83 year old man with PMH of advanced alcoholic liver dx, recurrent GI bleed including  variceal bleed and colonic AVMs here on account of abdominal pain and  BRBPR. Most recent admission for GI bleed was 3.5 months ago. Per chart, refused endoscopy in the past.    Vital Signs Last 24 Hrs  T(C): 36.3 (02 Jun 2023 01:20), Max: 36.9 (01 Jun 2023 18:37)  T(F): 97.4 (02 Jun 2023 01:20), Max: 98.4 (01 Jun 2023 18:37)  HR: 65 (02 Jun 2023 01:20) (65 - 78)  BP: 110/62 (02 Jun 2023 01:20) (109/59 - 138/65)  RR: 17 (02 Jun 2023 01:20) (17 - 18)  SpO2: 96% (02 Jun 2023 01:20) (96% - 99%)      Labs   Hgb- 11.4 up from 9  MCV 76.5 new from last admission  RDW - 22.3  BUN - 34 - elevated from baseline  Cr - 0.88  ALb - 2.5  ALP - 145    Impression   83 year old man with hx as above concerning for low non-variceal lower GI bleed.  Would admit for observation and further management   GI on call consulted by the ED physician       A/P   - Lower GI bleeding   - Azotemia from GI bleed/dehydration   - KAY with hemoconcentration; pre-renal     Plan   - Admit to Medicine   - IVF hydration   - Serial H/H   - Abdominal CT with contrast if concern for active bleeding  - GI consult   - Pain control

## 2023-06-02 NOTE — CONSULT NOTE ADULT - SUBJECTIVE AND OBJECTIVE BOX
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 Patient states that he has noticed small amounts of blood while having a bowel movement around 12pm. Patient denies any hematemesis. Patient states he has been having abdominal pain which was initially in the upper part of his abdomen but he's now having LLQ abdominal pain. 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 1000 milliLiter(s) (50 mL/Hr) IV Continuous <Continuous>  lactulose Syrup 10 Gram(s) Oral daily  levothyroxine 25 MICROGram(s) Oral daily  pantoprazole  Injectable 40 milliGRAM(s) IV Push daily    MEDICATIONS  (PRN):    Allergies    No Known Allergies    Intolerances            CONSTITUTIONAL:  No weight loss, fever, chills, weakness or fatigue.  HEENT:  Eyes:  No visual loss, blurred vision, double vision or yellow sclerae. Ears, Nose, Throat:  No hearing loss, sneezing, congestion, runny nose or sore throat.  SKIN:  No rash or itching.  CARDIOVASCULAR:  No chest pain, chest pressure or chest discomfort. No palpitations or edema.  RESPIRATORY:  No shortness of breath, cough or sputum.  GASTROINTESTINAL:  SEE HPI  GENITOURINARY:  No dysuria, hematuria, urinary frequency  NEUROLOGICAL:  No headache, dizziness, syncope, paralysis, ataxia, numbness or tingling in the extremities. No change in bowel or bladder control.  MUSCULOSKELETAL:  No muscle, back pain, joint pain or stiffness.        ______________________________________________________________________  PHYSICAL EXAM:  T(C): 36.6 (06-02-23 @ 11:24), Max: 36.9 (06-01-23 @ 18:37)  HR: 54 (06-02-23 @ 11:24)  BP: 132/53 (06-02-23 @ 11:24)  RR: 17 (06-02-23 @ 11:24)  SpO2: 100% (06-02-23 @ 11:24)  Wt(kg): --      GEN: malnourished   CVS: S1S2+  CHEST: clear to auscultation  ABD: soft , nontender, nondistended, bowel sounds present  EXTR: no cyanosis, no clubbing, no edema  NEURO: Awake and alert; oriented x3  SKIN:  warm;  non icteric  Rectum: GUZMAN with brown stool. no external hemorrhoids or anal fissure     ______________________________________________________________________  LABS:                        10.1   3.36  )-----------( 108      ( 02 Jun 2023 05:06 )             32.2     06-02    141  |  113<H>  |  27<H>  ----------------------------<  87  4.0   |  23  |  0.78    Ca    8.2<L>      02 Jun 2023 05:06  Phos  3.1     06-02  Mg     1.6     06-02    TPro  5.6<L>  /  Alb  2.2<L>  /  TBili  0.5  /  DBili  x   /  AST  20  /  ALT  16  /  AlkPhos  117  06-02    LIVER FUNCTIONS - ( 02 Jun 2023 05:06 )  Alb: 2.2 g/dL / Pro: 5.6 g/dL / ALK PHOS: 117 U/L / ALT: 16 U/L DA / AST: 20 U/L / GGT: x           PT/INR - ( 01 Jun 2023 22:00 )   PT: 18.1 sec;   INR: 1.51 ratio         PTT - ( 01 Jun 2023 22:00 )  PTT:32.7 sec  ____________________________________________    IMAGING:    ______________________________________________________________________  < from: CT Abdomen and Pelvis w/ IV Cont (02.07.23 @ 17:52) >  ACC: [id number] EXAM:  CT ABDOMEN AND PELVIS IC   ORDERED BY: KATHRYN RODGERS     PROCEDURE DATE:  02/07/2023          INTERPRETATION:  CLINICAL INFORMATION: 82-year-old man with history   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  Sagittal and coronal reformats were performed.    FINDINGS:  LOWER CHEST: Coronary artery calcification    LIVER: Cirrhosis. No focal lesion identified.  BILE DUCTS: Common duct stent again visualized with dilatation common   duct to approximately 2.1 cm. Apparent intraluminal calculi and debris   again noted similar in appearance to 04/20/2022. Pneumobilia. Mild   intrahepatic biliary duct dilatation.  GALLBLADDER: Gallstones.  SPLEEN: Within normal limits.  PANCREAS: 5 mm cyst proximal pancreatic body. 6 mm cyst pancreatic tail  ADRENALS: Within normal limits.  KIDNEYS/URETERS: Within normal limits.    BLADDER: Within normal limits.  REPRODUCTIVE ORGANS: Normal size prostate    BOWEL: No bowel obstruction. Appendix not visualized.  PERITONEUM: No ascites.  VESSELS: Mural plaque infrarenal abdominal aorta extending to the left   common iliac artery.  RETROPERITONEUM/LYMPH NODES: No lymphadenopathy.  ABDOMINAL WALL: Chronic hematoma right rectus sheath. Fat-containing   umbilical hernia  BONES: Right hip replacement. Degenerative change.    IMPRESSION:  Cirrhosis.    Common duct stent with markedly dilated common duct containing calculi   and debris similar in appearance to 04/20/2022.            --- End of Report ---    < end of copied text >  :

## 2023-06-02 NOTE — H&P ADULT - ASSESSMENT
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

## 2023-06-02 NOTE — H&P ADULT - PROBLEM/PLAN-2
Call placed to patient  Voicemail left on mobile #  Advised patient to call office back    DISPLAY PLAN FREE TEXT

## 2023-06-02 NOTE — CHART NOTE - NSCHARTNOTEFT_GEN_A_CORE
83 year old man with PMH of advanced alcoholic liver dx, recurrent GI bleed including  variceal bleed and colonic AVMs here on account of abdominal pain and  BRBPR. Most recent admission for GI bleed was 3.5 months ago. Per chart, refused endoscopy in the past.    Vital Signs Last 24 Hrs  T(C): 36.3 (02 Jun 2023 01:20), Max: 36.9 (01 Jun 2023 18:37)  T(F): 97.4 (02 Jun 2023 01:20), Max: 98.4 (01 Jun 2023 18:37)  HR: 65 (02 Jun 2023 01:20) (65 - 78)  BP: 110/62 (02 Jun 2023 01:20) (109/59 - 138/65)  RR: 17 (02 Jun 2023 01:20) (17 - 18)  SpO2: 96% (02 Jun 2023 01:20) (96% - 99%)      Labs   Hgb- 11.4 up from 9  MCV 76.5 new from last admission  RDW - 22.3  BUN - 34 - elevated from baseline  Cr - 0.88  ALb - 2.5  ALP - 145    Impression   83 year old man with hx as above concerning for low non-variceal lower GI bleed.  Would admit for observation and further management   Dr Rinaldi consulted by the ED physician       A/P   - Lower GI bleeding   - Azotemia from gi bleed/dehydration   - KAY with hemoconcentration; pre-renal     Plan   - Admit to Medicine   - IVF hydration   - Serial H/H   - Abdominal CT with contrast  - GI consult   - Pain control  - 83 year old man with PMH of advanced alcoholic liver dx, recurrent GI bleed including  variceal bleed and colonic AVMs here on account of abdominal pain and  BRBPR. Most recent admission for GI bleed was 3.5 months ago. Per chart, refused endoscopy in the past.    Vital Signs Last 24 Hrs  T(C): 36.3 (02 Jun 2023 01:20), Max: 36.9 (01 Jun 2023 18:37)  T(F): 97.4 (02 Jun 2023 01:20), Max: 98.4 (01 Jun 2023 18:37)  HR: 65 (02 Jun 2023 01:20) (65 - 78)  BP: 110/62 (02 Jun 2023 01:20) (109/59 - 138/65)  RR: 17 (02 Jun 2023 01:20) (17 - 18)  SpO2: 96% (02 Jun 2023 01:20) (96% - 99%)      Labs   Hgb- 11.4 up from 9  MCV 76.5 new from last admission  RDW - 22.3  BUN - 34 - elevated from baseline  Cr - 0.88  ALb - 2.5  ALP - 145    Impression   83 year old man with hx as above concerning for low non-variceal lower GI bleed.  Would admit for observation and further management   Dr Rinaldi consulted by the ED physician       A/P   - Lower GI bleeding   - Azotemia from GI bleed/dehydration   - KAY with hemoconcentration; pre-renal     Plan   - Admit to Medicine   - IVF hydration   - Serial H/H   - Abdominal CT with contrast if concern for active bleeding  - GI consult   - Pain control  -

## 2023-06-02 NOTE — H&P ADULT - HISTORY OF PRESENT ILLNESS
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 Patient states that he has noticed small amounts of blood while having a bowel movement around 12pm. Patient denies any hematemesis. Patient states he has been having abdominal pain which was initially in the upper part of his abdomen but he's now having LLQ abdominal pain.

## 2023-06-02 NOTE — ADVANCED PRACTICE NURSE CONSULT - ASSESSMENT
This is a 83yr old male patient admitted for GI Hemorrhage, presenting with the following:  -There is a Stage 1 Pressure Injury to the Bilateral Heels, as evidence by non-blanchable erythema  -There is an Unstageable Pressure Injury to the L. Sacrum (0.5cm x 1cm x 0.3cm) with slough and drainage

## 2023-06-02 NOTE — CONSULT NOTE ADULT - ASSESSMENT
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 Gi consulted for rectal bleeding. Labs from admission HH 10.1/32.2 MCV 76  INR 1.5 Cr/BUN 1/34. Labs f4rom 4/19/23 Hgb 9.7.  Patient states that he has noticed small amounts of blood while having a bowel movement around 12pm. The blood was present because he was straining He has chronic history of constipation. Patient denies any hematemesis. Patient seen and examined at bedside. Patient denies any difficulty swallowing or reflux. Patient states he has been having abdominal pain which was initially in the upper part of his abdomen but he's now having LLQ abdominal pain. No dark tarry stools. Slight blood tinged when wiping. No rectal pain. Normal caliber. Colonoscopy from 2020 with hemorrhoids s/p APC. EGD from 2/2023 with one non bleeding esophageal varice and not banded. ERCP from 3/2023 no signs of EV. Of note, patient is due for a repeat ERCP with CBD stent removal on 6/5/23.       #Rectal bleeding  #CBD stricture  P/W rectal bleeding times 1 day. His Hgb is trending up since 4/19/23. Digital rectal exam with brown stool.  Colonoscopy from 2020 with hemorrhoids s/p APC. EGD from 2/2023 with one non bleeding esophageal varice and not banded. ERCP from 3/2023 no signs of EV. Rectal bleeding likely due to hemorrhoid in setting of constipation. Of note, patient is due for a repeat ERCP with CBD stent removal on 6/5/23. No acute Gi intervention is warranted at this time.   Trend Hgb and if stable may be discharged from GI stand point  colace daily  Miralax PRN   Increase fiber   Outpatient follow up with Dr. Lemus for rectal bleeding reoccurs.   planned for ERCp on 6/5/23 in an ambulatory setting

## 2023-06-03 NOTE — DISCHARGE NOTE PROVIDER - ATTENDING ATTESTATION STATEMENT
normal...
I have personally seen and examined the patient. I have collaborated with and supervised the

## 2023-06-03 NOTE — PROGRESS NOTE ADULT - SUBJECTIVE AND OBJECTIVE BOX
* NOTE IS PENDING *    S:    O:  Vital Signs Last 24 Hrs  T(C): 36.4 (03 Jun 2023 05:04), Max: 36.8 (02 Jun 2023 20:36)  T(F): 97.6 (03 Jun 2023 05:04), Max: 98.3 (02 Jun 2023 20:36)  HR: 58 (03 Jun 2023 05:04) (58 - 64)  BP: 141/72 (03 Jun 2023 05:04) (133/62 - 141/72)  BP(mean): --  RR: 17 (03 Jun 2023 05:04) (17 - 17)  SpO2: 100% (03 Jun 2023 05:04) (99% - 100%)    Parameters below as of 03 Jun 2023 05:04  Patient On (Oxygen Delivery Method): room air        GENERAL: NAD, well-developed  HEAD:  Atraumatic, Normocephalic  EYES: EOMI, PERRLA, conjunctiva and sclera clear  NECK: Supple, No JVD  CHEST/LUNG: Clear to auscultation bilaterally; No wheeze  HEART: Regular rate and rhythm; No murmurs, rubs, or gallops  ABDOMEN: Soft, Nontender, Nondistended; Bowel sounds present  EXTREMITIES:  2+ Peripheral Pulses, No clubbing, cyanosis, or edema  PSYCH: AAOx3  NEUROLOGY: non-focal  SKIN: No rashes or lesions    lactated ringers. 1000 milliLiter(s) IV Continuous <Continuous>  lactulose Syrup 10 Gram(s) Oral daily  levothyroxine 25 MICROGram(s) Oral daily  pantoprazole  Injectable 40 milliGRAM(s) IV Push daily                            10.6   4.10  )-----------( 120      ( 03 Jun 2023 06:36 )             34.3       06-02    141  |  113<H>  |  27<H>  ----------------------------<  87  4.0   |  23  |  0.78    Ca    8.2<L>      02 Jun 2023 05:06  Phos  3.1     06-02  Mg     1.6     06-02    TPro  5.6<L>  /  Alb  2.2<L>  /  TBili  0.5  /  DBili  x   /  AST  20  /  ALT  16  /  AlkPhos  117  06-02   S: Patient without complaints. Denies chest pain or shortness of breath. No signs of bleeding/    O:  Vital Signs Last 24 Hrs  T(C): 36.4 (03 Jun 2023 05:04), Max: 36.8 (02 Jun 2023 20:36)  T(F): 97.6 (03 Jun 2023 05:04), Max: 98.3 (02 Jun 2023 20:36)  HR: 58 (03 Jun 2023 05:04) (58 - 64)  BP: 141/72 (03 Jun 2023 05:04) (133/62 - 141/72)  BP(mean): --  RR: 17 (03 Jun 2023 05:04) (17 - 17)  SpO2: 100% (03 Jun 2023 05:04) (99% - 100%)    Parameters below as of 03 Jun 2023 05:04  Patient On (Oxygen Delivery Method): room air        GENERAL: NAD, well-developed  HEAD:  Atraumatic, Normocephalic  EYES: EOMI, PERRLA, conjunctiva and sclera clear  NECK: Supple, No JVD  CHEST/LUNG: Clear to auscultation bilaterally; No wheeze  HEART: Regular rate and rhythm; No murmurs, rubs, or gallops  ABDOMEN: Soft, Nontender, Nondistended; Bowel sounds present  EXTREMITIES:  2+ Peripheral Pulses, No clubbing, cyanosis, or edema  PSYCH: AAOx3  NEUROLOGY: non-focal  SKIN: No rashes or lesions    lactated ringers. 1000 milliLiter(s) IV Continuous <Continuous>  lactulose Syrup 10 Gram(s) Oral daily  levothyroxine 25 MICROGram(s) Oral daily  pantoprazole  Injectable 40 milliGRAM(s) IV Push daily                            10.6   4.10  )-----------( 120      ( 03 Jun 2023 06:36 )             34.3       06-02    141  |  113<H>  |  27<H>  ----------------------------<  87  4.0   |  23  |  0.78    Ca    8.2<L>      02 Jun 2023 05:06  Phos  3.1     06-02  Mg     1.6     06-02    TPro  5.6<L>  /  Alb  2.2<L>  /  TBili  0.5  /  DBili  x   /  AST  20  /  ALT  16  /  AlkPhos  117  06-02

## 2023-06-03 NOTE — DISCHARGE NOTE PROVIDER - CARE PROVIDER_API CALL
Nishant Galdamez  Gastroenterology  [address] [facility] [place], [address] [place] [address] [place] [address]  Phone: [phone]  Fax: [phone]  Follow Up Time: 1 week    You Hermosillo  [facility]  [address] [place] [id number]  Phone: [phone]  Fax: [phone]  Follow Up Time: 1 week

## 2023-06-03 NOTE — DISCHARGE NOTE PROVIDER - HOSPITAL COURSE
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 Patient with prior bleeding history requiring transfusion. Hemoglobin reviewed, greater than previous admissions. Patient monitored in the hospital. GI consulted, not recommending further work-up. Hematochezia resolved. Patient medically ready for discharge. Family contacted and will take patient home. Discussed with GI, Dr. Galdamez who reports patient should follow-up at previously scheduled appointment time for stent removal.

## 2023-06-03 NOTE — DISCHARGE NOTE PROVIDER - NSDCCPCAREPLAN_GEN_ALL_CORE_FT
PRINCIPAL DISCHARGE DIAGNOSIS  Diagnosis: Lower GI bleed  Assessment and Plan of Treatment: You presented with bleeding from your rectum. Your hemoglobin was monitored in the hospital and was determined to be stable. Bleeding could have been due to an AVM or hemorrhoids. You were evaluated by a gastroenterologist who did not recommend any further inpatient work-up. Your bleeding resolved on its own. You should follow-up with your gastroenterologist for further work-up.      SECONDARY DISCHARGE DIAGNOSES  Diagnosis: Liver cirrhosis  Assessment and Plan of Treatment: Continue on lactulose as prescribed. Please follow-up with your previous appointment for removal of your biliary stent.    Diagnosis: Hypothyroidism  Assessment and Plan of Treatment: Continue on levothyroxine as prescribed.

## 2023-06-03 NOTE — DISCHARGE NOTE PROVIDER - NSDCMRMEDTOKEN_GEN_ALL_CORE_FT
Colace 100 mg oral capsule: 1 cap(s) orally 3 times a day  famotidine 20 mg oral tablet: orally once a day  ferrous sulfate 325 mg (65 mg elemental iron) oral tablet: 1 tab(s) orally once a day (at bedtime)  lactulose 10 g/15 mL oral syrup: 15 milliliter(s) orally once a day  levothyroxine 25 mcg (0.025 mg) oral tablet: 1 tab(s) orally once a day  Melatonin 1 mg oral tablet: 1 tab(s) orally once a day (at bedtime)  polyethylene glycol 3350 oral powder for reconstitution: 17 gram(s) orally every 12 hours  Senna 8.6 mg oral tablet: 2 tab(s) orally once a day (at bedtime)  spironolactone 25 mg oral tablet: 1 tab(s) orally once a day

## 2023-06-03 NOTE — DISCHARGE NOTE PROVIDER - PROVIDER TOKENS
PROVIDER:[TOKEN:[[id number]],FOLLOWUP:[1 week]],PROVIDER:[TOKEN:[[id number]],FOLLOWUP:[1 week]]

## 2023-06-03 NOTE — PROGRESS NOTE ADULT - ASSESSMENT
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 Admitted for lower GIB      
82yo M PMHx of cirrhosis, AVMs, GBS presenting with hematochezia.    #Hematochezia  #Compensated Cirrhosis  #H/o AVM  #Hypothyroidism    -Hgb stable, patient ready for discharge  -compensated cirrhosis, no signs of encephalopathy  -continue on spironolactone  -continue synthroid 25mcg daily  -continue on lactulose  -family to arrange for resumption of HHA, possible discharge today or tomorrow morning.  -outpatient follow-up for stent removal

## 2023-06-04 NOTE — DISCHARGE NOTE NURSING/CASE MANAGEMENT/SOCIAL WORK - PATIENT PORTAL LINK FT
You can access the FollowMyHealth Patient Portal offered by [facility] by registering at the following website: [email or web address]. By joining [facility]’s FollowMyHealth portal, you will also be able to view your health information using other applications (apps) compatible with our system.

## 2023-06-04 NOTE — DISCHARGE NOTE NURSING/CASE MANAGEMENT/SOCIAL WORK - NSDCPEFALRISK_GEN_ALL_CORE
For information on Fall & Injury Prevention, visit: [email or web address] OR  [email or web address] OR  https://www.cdc.gov/steadi/patient.html

## 2023-06-05 NOTE — ASU PATIENT PROFILE, ADULT - NSICDXPASTSURGICALHX_GEN_ALL_CORE_FT
PAST SURGICAL HISTORY:  H/O inguinal hernia repair     H/O spinal stenosis     History of hip replacement 10/2020    History of repair of hip fracture R hip

## 2023-06-05 NOTE — ASU DISCHARGE PLAN (ADULT/PEDIATRIC) - NS MD DC FALL RISK RISK
For information on Fall & Injury Prevention, visit: [email or web address] OR  [email or web address] OR  https://www.cdc.gov/steadi/patient.html

## 2023-06-05 NOTE — ASU PATIENT PROFILE, ADULT - NSICDXPASTMEDICALHX_GEN_ALL_CORE_FT
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

## 2023-06-05 NOTE — ASU PATIENT PROFILE, ADULT - PRO INTERPRETER NEED 2
November 9, 2018      Susie Gimenez  [address] [facility] [address]        To Whom It May Concern:    Susie Gimenez was seen in our clinic. Please excuse her mother from work today.  Sincerely,        Esperanza Tiwari MD           English

## 2023-06-05 NOTE — ASU PATIENT PROFILE, ADULT - FALL HARM RISK - UNIVERSAL INTERVENTIONS
Bed in lowest position, wheels locked, appropriate side rails in place/Call bell, personal items and telephone in reach/Instruct patient to call for assistance before getting out of bed or chair/Non-slip footwear when patient is out of bed/[place] to call system/Physically safe environment - no spills, clutter or unnecessary equipment/Purposeful Proactive Rounding/Room/bathroom lighting operational, light cord in reach

## 2023-06-22 NOTE — PROGRESS NOTE ADULT - NSICDXPILOT_GEN_ALL_CORE
[place]
Gabapentin Counseling: I discussed with the patient the risks of gabapentin including but not limited to dizziness, somnolence, fatigue and ataxia.

## 2023-07-05 NOTE — PRE-OP CHECKLIST - IV STARTED
right arm x 2/yes Drysol Counseling:  I discussed with the patient the risks of drysol/aluminum chloride including but not limited to skin rash, itching, irritation, burning.

## 2023-07-15 PROBLEM — E03.9 HYPOTHYROIDISM, UNSPECIFIED: Chronic | Status: ACTIVE | Noted: 2023-01-01

## 2023-07-15 PROBLEM — K21.9 GASTRO-ESOPHAGEAL REFLUX DISEASE WITHOUT ESOPHAGITIS: Chronic | Status: ACTIVE | Noted: 2023-01-01

## 2023-07-15 NOTE — ED PROVIDER NOTE - CLINICAL SUMMARY MEDICAL DECISION MAKING FREE TEXT BOX
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 no diarrhea, no fever, + chills and tremors. no sick contact, no alcohol use, no cough.    abd pain possibly pancreatitis vs biliary disease vs colitis vs ischemic gut? vs appy- labs, meds, ct

## 2023-07-15 NOTE — ED PROVIDER NOTE - PATIENT PORTAL LINK FT
You can access the FollowMyHealth Patient Portal offered by [facility] by registering at the following website: [email or web address]. By joining [employer]’s FollowMyHealth portal, you will also be able to view your health information using other applications (apps) compatible with our system.

## 2023-07-15 NOTE — ED ADULT NURSE NOTE - OBJECTIVE STATEMENT
Pt arrived to ED c/o abdominal pain, has a Hx of cirrhosis  Abdomen noted distended Pt arrived to ED c/o abdominal pain, has a Hx of cirrhosis 2/2 alcohol use .  Denies diarrhea, c/o nausea without vomiting

## 2023-07-15 NOTE — ED ADULT NURSE NOTE - NSFALLRISKINTERV_ED_ALL_ED

## 2023-07-15 NOTE — ED PROVIDER NOTE - PROGRESS NOTE DETAILS
[place]: rpt abd exam nt/nd, soft. pt states he feels better. will perform po challenge. ct shows no acute findings. chronic changes noted [place]: improve. tolerated po. spoke with wife [phone] and will come back to  pt. dc

## 2023-07-15 NOTE — ED PROVIDER NOTE - OBJECTIVE STATEMENT
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 no diarrhea, no fever, + chills and tremors. no sick contact, no alcohol use, no cough.

## 2023-07-15 NOTE — ED PROVIDER NOTE - TEMPLATE, MLM
Progress Notes by Mariia Reynoso General

## 2023-07-25 NOTE — ED PROVIDER NOTE - SEVERITY
Overall pattern in the right upper extremity fits best with repetitive motion/overuse.  Some tenderness and soreness in the forearm most consistent with muscular source.  Discomfort in the right wrist may be related to repetitive motion as well, along with potential for small underlying ganglion cyst at the radial and volar aspect.  Additionally, we discussed distribution of the numbness, consistent with ulnar nerve, likely from the elbow.  For next steps, we discussed considerations around imaging (elbow x-ray versus MRI, wrist MRI), referral to hand therapy, use of support options for the right upper extremity (including tennis elbow strap, overnight use of towel roll, and wrist brace).  We also discussed potential for electrodiagnostic testing (EMG).  Following discussion, hand therapy referral placed.  Also reviewed use of forearm strap and wrist brace, for comfort with activities.  Would advise use of towel roll overnight with sleep.  Plan to monitor course with hand therapy 1 month to begin.  If not improving during that time, tentatively recheck.  Contact clinic sooner if needed.    If you have any further questions for your physician or physician s care team you can contact them thru [employer] or by calling [phone].     SEVERE

## 2023-08-07 NOTE — H&P ADULT - PROBLEM SELECTOR PROBLEM 4
[facility] PHYSICIAN SERVICES  [facility] INTERNAL MEDICINE  [address] [facility],[address] 346  [address] [address]  Dept: [phone]  Dept Fax: [phone]: [address] (:  1950) is a 68 y.o. male,Established patient  with green, here for evaluation of the following chief complaint(s): Abdominal Pain (Right abdominal pain pt states that he isn't able to bend . Pt states this has been going on for about a week . )      Glory Mo is a 68 y.o. male who presents today for his medical conditions/complaints as noted below. Glory Mo is c/mayco Abdominal Pain (Right abdominal pain pt states that he isn't able to bend . Pt states this has been going on for about a week . )        HPI:     Chief Complaint   Patient presents with    Abdominal Pain     Right abdominal pain pt states that he isn't able to bend . Pt states this has been going on for about a week .       @pt is alone    HPI    Abd pain for a week; he had RUQ; and the pain seems to be getting worse; no n/v/d  has no gallbladder   he hd endo in  april and found nothing ; he si on carafate tid and protonix 40 bid ; also a recent negative cologard;   a movement just from side to side makes this work;    RLQ pain  he is concerned about his appendix;  no fever n,v,d,       Past Medical History:   Diagnosis Date    Allergic rhinitis     Bacterial folliculitis 9383    Erectile dysfunction 2018    Essential hypertension 2017    Gastroesophageal reflux disease without esophagitis 2017    Hyperglycemia     Hyperlipidemia     Hypertension     Hypokalemia     Osteoarthritis     Recurrent bronchospasm 2017    Seasonal allergies 2017    Shoulder pain     Spondylosis of lumbar region without myelopathy or radiculopathy 2017    Type 2 diabetes mellitus without complication, without long-term current use of insulin ([address] [facility]) 2017    BORDERLINE      Past Surgical History:   Procedure Laterality Date
Anemia

## 2023-09-15 NOTE — ED ADULT NURSE NOTE - NSFALLRISKINTERV_ED_ALL_ED
Assistance OOB with selected safe patient handling equipment if applicable/Assistance with ambulation/Communicate fall risk and risk factors to all staff, patient, and family/Provide patient with walking aids/Provide visual cue: yellow wristband, yellow gown, etc/Reinforce activity limits and safety measures with patient and family/Call bell, personal items and telephone in reach/Instruct patient to call for assistance before getting out of bed/chair/stretcher/Non-slip footwear applied when patient is off stretcher/[place] to call system/Physically safe environment - no spills, clutter or unnecessary equipment/Purposeful Proactive Rounding/Room/bathroom lighting operational, light cord in reach

## 2023-09-15 NOTE — H&P ADULT - PROBLEM SELECTOR PLAN 9
- NA: 131  - - NA: 131 likely secondary to cirrhosis causing hypovolemia and diuretic use   -f/u repeat BMP   - consider albumin infusion

## 2023-09-15 NOTE — H&P ADULT - NSHPPHYSICALEXAM_GEN_ALL_CORE
GENERAL: NAD, well-developed, well-groomed  HEAD:  Atraumatic, Normocephalic  EYES:  conjunctiva and sclera clear  NECK: Supple, No JVD, Normal thyroid  CHEST/LUNG: Clear to auscultation. Clear to percussion bilaterally; No rales, rhonchi, wheezing, or rubs  HEART: Regular rate and rhythm; No murmurs, rubs, or gallops  ABDOMEN: Soft, Nontender, Nondistended; Bowel sounds present  NERVOUS SYSTEM:  Alert & Oriented X3,    EXTREMITIES:  2+ Peripheral Pulses, No clubbing, cyanosis, or edema  SKIN: warm dry GENERAL: elderly male NAD  HEAD:  Atraumatic, Normocephalic  EYES:  scleral icterus   NECK: Supple, No JVD, Normal thyroid  CHEST/LUNG: Clear to auscultation.  No rales, rhonchi, wheezing, or rubs  HEART: Regular rate and rhythm; No murmurs, rubs, or gallops  ABDOMEN: (+) fluid thrill,(+) distended  (+) TTP; Bowel sounds present  NERVOUS SYSTEM:  Alert & Oriented X2, (+) Asterixes     EXTREMITIES:  2+ Peripheral Pulses, No clubbing, cyanosis, or edema  SKIN: b/l upper arm ecchymosis

## 2023-09-15 NOTE — ED ADULT NURSE NOTE - OBJECTIVE STATEMENT
Patient aox3, c/o abdominal pain with nausea, difficulty swallowing x2 days and general weakness x1 week. Pt denies any vomiting or diarrhea.

## 2023-09-15 NOTE — H&P ADULT - PROBLEM SELECTOR PLAN 4
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

## 2023-09-15 NOTE — ED PROVIDER NOTE - OBJECTIVE STATEMENT
83-year-old male with history of cirrhosis secondary to EtOH abuse, hypertension, hypothyroidism.  As per wife patient complaining of constant generalized abdominal pain for past 2 days, dysphagia, no appetite, generalized weakness.  Patient also with coughing for past 4 to 5 days, whitish sputum, decreased urination, noted dark-colored urine.  Last BM 2 days ago with dark stool.  Patient admits his abdominal pain is similar to his previous pancreatitis.  Patient denies any vomiting, fever, dysuria.  Today with complaint of left lower rib pain, worse with movement, patient denies injury, took nothing for his pain

## 2023-09-15 NOTE — ED PROVIDER NOTE - ENDOCRINE NEGATIVE STATEMENT, MLM
[facility] Hospitalist Progress Note    SUBJECTIVE:    NOTE: breathing about the same, no cp     OBJECTIVE:    Current Facility-Administered Medications   Medication   • remdesivir 100 mg in sodium chloride 0.9 % 250 mL total volume infusion   • sodium chloride 0.9 % flush bag 25 mL   • sodium chloride (PF) 0.9 % injection 2 mL   • sodium chloride (NORMAL SALINE) 0.9 % bolus 500 mL   • enoxaparin (LOVENOX) injection 40 mg   • acetaminophen (TYLENOL) tablet 650 mg   • ondansetron (ZOFRAN) injection 4 mg   • benzonatate (TESSALON PERLES) capsule 100 mg   • guaiFENesin-DM (MUCINEX DM) 600-30 mg ER tablet 2 tablet   • zinc sulfate (ZINCATE) capsule 220 mg   • ascorbic acid (VITAMIN C) tablet 500 mg   • cholecalciferol (VITAMIN D) tablet 25 mcg   • azithromycin (ZITHROMAX) 500 mg in sodium chloride 0.9 % 250 mL IVPB   • dexamethasone (DECADRON) tablet 10 mg   • triamcinolone (ARISTOCORT) 0.1 % cream       I/O's    Intake/Output Summary (Last 24 hours) at 5/26/2021 1115  Last data filed at 5/26/2021 1000  Gross per 24 hour   Intake 805.44 ml   Output 350 ml   Net 455.44 ml       Last Recorded Vitals  Vital Last Value 24 Hour Range   Temperature 98.2 °F (36.8 °C) (05/26/21 1038) Temp  Min: 97.7 °F (36.5 °C)  Max: 98.4 °F (36.9 °C)   Pulse 73 (05/26/21 1038) Pulse  Min: 60  Max: 83   Respiratory 17 (05/26/21 1038) Resp  Min: 14  Max: 22   Non-Invasive  Blood Pressure 120/81 (05/26/21 1038) BP  Min: 120/81  Max: 132/84   Pulse Oximetry 94 % (05/26/21 1038) SpO2  Min: 93 %  Max: 96 %     Vital Today Admitted   Weight 92.8 kg (204 lb 9.4 oz) (05/25/21 1104) Weight: 91.9 kg (202 lb 9.6 oz) (05/25/21 0731)   Height N/A Height: 5' 5\" (165.1 cm) (05/25/21 0731)   BMI N/A BMI (Calculated): 33.71 (05/25/21 7231)       Physical Exam  Vitals and nursing note reviewed.   HENT:      Head: Normocephalic.   Cardiovascular:      Rate and Rhythm: Normal rate and regular rhythm.      Pulses: Normal pulses.      Heart sounds: Normal heart sounds.    Pulmonary:      Comments: On 2 L O2 NC  Musculoskeletal:      Right lower leg: No edema.      Left lower leg: No edema.   Skin:     Findings: Rash present.      Comments: Scattered pruritic plaque like lesion abd arms  No blisters or erythema   Neurological:      Mental Status: He is alert and oriented to person, place, and time.         Labs   Recent Labs   Lab 05/26/21  0503 05/25/21  0742   WBC 6.5 7.1   HCT 43.2 44.0   HGB 14.2 14.7    159   INR  --  1.0   PTT  --  27   SODIUM 138 135   POTASSIUM 4.5 3.7   CHLORIDE 106 101   CO2 31 31   CALCIUM 9.1 9.3   GLUCOSE 157* 148*   BUN 22* 16   CREATININE 0.70 0.88   AST 27 30   GPT 33 31   ALKPT 67 68   BILIRUBIN 0.5 0.7   ALBUMIN 3.1* 3.6       Imaging  No results found.      ASSESSMENT/PLAN:    Acute hypoxic respiratory failure 2/2 COVID-19 pneumonia  - Oxygenating on 2L NCO2, wean as able  - Vitamin D 7.6, CRP 9.4, , procal 0.19  - Rapid SARS-CoV-2 PCR positive  - Blood cx x2- NGTD, following  - CXR (5/25) noted lower lung zone opacities consistent with Covid 19 infection  - Continue Remdesivir   - Abx coverage w/ IV Azithromycin, discontinued IV Rocephin  - Steroid tx w/ PO Dexamethasone 10mg BID  - Supplementation w/ Vitamin C, Vitamin D and zincate   - Respiratory support w/ guaifenesin-DM and PRN tessalon perles  - Encourage IS use. Monitor via telemetry. Maintain contact and droplet isolation  - ID following     Skin rash, possibly immune mediated related to COVID-19 versus Psoriatic eruption  - Continue topical triamcinolone    Elevated blood sugars- check HgbA1c    Hypermagnesemia, likely reactive to above  - Mg 2.5 today    Obesity (BMI 34.05)- Dietary/lifestyle modifications advised     DVT PPx: SCD's, Lovenox    DISPO: Pending clinical/respiratory improvement and specialist recommendations    PCP:  No Pcp     Charting performed by daisy Zavala for Dr. Chary Toro    All medical record entries made by the scribe were at my  direction. I have reviewed the chart and agree that the record accurately reflects my personal performance of the history, physical exam, hospital course, and assessment and plan.     no diabetes and no thyroid trouble.

## 2023-09-15 NOTE — H&P ADULT - HISTORY OF PRESENT ILLNESS
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 As per wife she denies any cough, runny nose. Patient has been having black stool 2 days ago  with dark-colored urine. 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 As per wifehe has no  cough, runny nose. Patient has been having black stools for the past 2 days  with dark-colored urine. He also endorse chest pain, midsternal  Denies any hematemesis, fevers, shortness of breath, N/V/D.      In ED:   Vitals: BP: 94/67mmHg, HR: 130, T: 36.7 on RA  s/p 2LNS   D50 x2   f/u Bcux and Ucx   CT A/P/C: basilar atelectasis, cirrhosis, exophytic ,mass post to liver and extending to diaphragm (hepatoma)   stabe pancreatic head mass 2.8cm   Biliary ductal dilation unchanged from prior

## 2023-09-15 NOTE — ED ADULT NURSE NOTE - NSHOSCREENINGQ1_ED_ALL_ED
Cindy Score is a [id number] [place] y.o. female 22w2d    The patient was seen and evaluated. She is here following her anatomy usn- reviewed with patient. Echogenic foci of left ventricle present and will be recheck at 30 weeks. There was positive fetal movements. No contractions or leakage of fluid. Signs and symptoms of  labor as well as labor were reviewed. The patients anatomy ultrasound has been completed and reviewed with patient. A 28 week lab panel was ordered. This includes a (HH, 1 hr GTT, 3 hr GTT). The patient is to complete this in the next two to four weeks. The S/S of Pre-Eclampsia were reviewed with the patient in detail. She is to report any of these if they occur. She currently denies any of these. The patient is RH positive Rhogam Ordered no    The patient was instructed on fetal kick counts and was encouraged to monitor every 8 hours. This is to begin at 28 weeks gestation. She was instructed that the baby should move at a minimum of ten times within one hour after a meal. The patient was instructed to lay down on her left side twenty minutes after eating and count movements for up to one hour with a target value of ten movements. She was instructed to notify the office if she did not make that target after two attempts or if after any attempt there was less than four movements. No Patient Care Coordination Note on file. Assessment:  1. Cindy Score is a [age] [address] [place] y.o. female  2.   3. 21w2d    There are no active problems to display for this patient. Diagnosis Orders   1. 21 weeks gestation of pregnancy     2. Encounter for supervision of other normal pregnancy in second trimester           Plan:  The patient will return to the office for her next visit in 4 weeks. See antepartum flow sheet. No

## 2023-09-15 NOTE — H&P ADULT - PROBLEM SELECTOR PLAN 1
p/w generalized abdominal pain, ascites on CT   Tachycardic, hypotensive, lactate 3  trend lactate  received 2L  bolus  f/u blood cultures, urine cultures  ua pending   s/p Diagnostic para   f/u fluid analysis for SBP   f/u ammonia  c/w lactulose   cover with Zosyn   Hepatology consult p/w generalized abdominal pain, ascites on CT   Tachycardic, hypotensive, lactate 3.7> 3.1>   trend lactate  received 2L  bolus  f/u blood cultures, urine cultures  ua pending   s/p Diagnostic para   f/u fluid analysis for SBP   f/u ammonia  c/w lactulose   cover with Zosyn   Hepatology consult p/w generalized abdominal pain, ascites on CT scan   - Maybe secondary to SBP   Tachycardic, hypotensive, lactate 3.7> 3.1>   -CT A/P/C: basilar atelectasis, cirrhosis, exophytic ,mass post to liver and extending to diaphragm (hepatoma)   stabe pancreatic head mass 2.8cm Biliary ductal dilation unchanged from prior    trend lactate  received 2L  bolus  f/u blood cultures, urine cultures  ua pending   s/p Diagnostic para   f/u fluid analysis   f/u ammonia  c/w lactulose to have 2-3 bowel movements daily  cover with Zosyn   Hepatology consult

## 2023-09-15 NOTE — H&P ADULT - ATTENDING COMMENTS
I have seen and evaluated the patient in the ED with the resident Dr. Mandy Esqueda. I agree with her history documented.      Briefly, this gentleman with medical history most notable for cirrhosis and previous biliary stenting presents acutely with abdominal pain, anorexia, weakness, and a report of dark stool. He is a poor historian himself and seems pleasantly encephalopathic on history taking; the majority is provided by his spouse that is documented above. At the time of interviewing, he is complaining of diffuse abdominal pain but no other focal symptoms. He seemed aware he was in a hospital but did not grasp his current situation entirely.      On exam, he is tachycardic and appears uncomfortable. He is afebrile, with HR in the 100-110 range with systolic blood pressure in the low 100s. He is able to follow simple commands neurologically. Cardiac exam is significant only for tachycardia. On abdominal exam, he is distended with dullness to percussion. Bedside US demonstrates a sufficient pocket of ascites in the right lower quadrant. He does have asterixis on examination.      I have reviewed his CBC, BMP, LFTs, INR. His CBC demonstrates normal hemoglobin (despite report of dark stools) and normal WBC. His BMP was a hemolyzed sample that demonstrated low Na at 131 and pseudohyperkalemia to 6. His BUN is modestly elevated to 20 but Cr is normal. His INR of 1.8 and bilirubin of 2 corresponds to a MELD score of 22.      I have reviewed his CT scan of the abdomen which notes cirrhosis and gross ascites but patent portal and hepatic veins. There is mass in the liver described previously consistent with a hepatoma. MRI may be indicated. CT Chest unremarkable.     Patient was given a dose of Zosyn in the ED.      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 Presentation is concerning for sepsis – considering spontaneous bacterial peritonitis given the presence of ascites and diffuse abdominal pain– precipitating hepatic encephalopathy.      Certainly considering a possible upper GI bleed as well given report of dark stools, but 2 days of melena would certainly expect to be enough time to see a decline in Hgb should there be a clinically significant bleed. Will trend CBC.     #Sepsis secondary to SBP/intraabdominal Infection   #Lactic Acidosis   #Tachycardia   #Hepatic Encephalopathy    #Cirrhosis   #Hyponatremia   #Pseudohyperkalemia    #Hypoglycemia s/p D50   #Hypothyroidism     -patient s/p 2 L NS in ED; hold additional crystalloid (has exceeded 30 cc /kg) and consider albumin for additional supplementation    -s/p diagnostic para in ED, follow up cell count and differential and cultures   -blood cultures x 2 and UA/urine culture   -continue Zosyn    -trend lactic acid, though may not normalize post resuscitation given concomitant cirrhosis  -repeat BMP given hemolyzed sample   -trend CBC   -hold home antihypertensives   -lactulose aiming for 3-5 BMs and treat underlying causes (likely infection)   -Q4 hour glucose checks

## 2023-09-15 NOTE — ED ADULT NURSE NOTE - NSICDXPASTMEDICALHX_GEN_ALL_CORE_FT
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

## 2023-09-15 NOTE — ED PROVIDER NOTE - PROGRESS NOTE DETAILS
labs/CT result explained to pt  Pt with sepsis, ascites, hepatoma, elevated lactate, will admit pt.  Case d/w Dr. Kern labs/CT result explained to pt  Pt with sepsis, ascites, concern for ABP, hepatoma, elevated lactate, will admit pt.  Case d/w Dr. Kern

## 2023-09-15 NOTE — H&P ADULT - ASSESSMENT
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 As per wife she denies any cough, runny nose. Patient has been having black stool 2 days ago  with dark-colored urine. 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 As per wifehe has no  cough, runny nose. Patient has been having black stools for the past 2 days  with dark-colored urine. Admitted for sepsis     In ED:   Vitals: BP: 94/67mmHg, HR: 130, T: 36.7 on RA  s/p 2LNS   D50 x2   f/u Bcux and Ucx   CT A/P/C: basilar atelectasis, cirrhosis, exophytic ,mass post to liver and extending to diaphragm (hepatoma)   stabe pancreatic head mass 2.8cm   Biliary ductal dilation unchanged from prior

## 2023-09-15 NOTE — PROCEDURE NOTE - DRAIN LEFT IN PLACE:___ SIZE
HISTORY OF PRESENT ILLNESS     HPI  29 yo male comes in with 5 days of sob. No fever. Has a headache,  Nausea, bodyaches. No more nasal congestion.  Was coughing.  No vomiting or diarrhea.    PAST MEDICAL, FAMILY AND SOCIAL HISTORY     The following histories were personally reviewed and updated.  Current medications, Allergies, Problem list, Past Medical History, Past Surgical History, Social History and Family History    REVIEW OF SYSTEMS     Review of Systems   Constitutional: Positive for fatigue. Negative for fever.   HENT: Positive for congestion and rhinorrhea.    Respiratory: Positive for cough and shortness of breath.    Cardiovascular: Negative.    Gastrointestinal: Positive for nausea. Negative for diarrhea and vomiting.   Musculoskeletal: Positive for arthralgias and myalgias.   Neurological: Positive for headaches.       PHYSICAL EXAM     Physical Exam  PHYSICAL EXAM:  Vitals:  Reviewed, see nursing chart.  Constitutional:  No acute distress, non-toxic appearance.  HENT:  Normocephalic, atraumatic. Bilateral external ears normal.  TMs are clear bilaterally Oropharynx moist. No oral exudates. Nose congested  Eyes:  Pupils equal, round, reactive to light and accommodation. Extraocular movements intact. Conjunctivae normal, no discharge.  Neck:  Normal range of motion, no tenderness, supple. No lymphadenopathy. No stridor.  Cardiovascular:  Normal heart rate. Normal rhythm. No murmurs. No rubs. No gallops.  Pulmonary/Chest:  Normal breath sounds, No respiratory distress. No wheezing. No chest tenderness.  Extremities:  Normal range of motion. Intact distal pulses. No edema. No tenderness.  Lymphatic:  No lymphadenopathy noted.  Neurologic:  Alert and oriented x 3. Normal motor function. Normal sensory function. No focal deficits.  Skin:  Warm, dry. No erythema. No rash.  Psychiatric:  Affect normal. Judgment normal. Mood normal.  ASSESSMENT/PLAN     30-year-old male here with viral illness.    COVID PCR  is done.  Patient is to self isolate until results are back.  I have recommended fluids, rest, Tylenol or ibuprofen for discomfort.  Oxygen saturation and lung exam are good which is related the patient.  Patient is comfortable with the plan   50

## 2023-09-15 NOTE — ED PROVIDER NOTE - CARE PLAN
1 Principal Discharge DX:	Sepsis with hypotension  Secondary Diagnosis:	Ascites  Secondary Diagnosis:	Hepatoma   Principal Discharge DX:	Sepsis with hypotension  Secondary Diagnosis:	Ascites  Secondary Diagnosis:	Hepatoma  Secondary Diagnosis:	Hypoglycemia

## 2023-09-15 NOTE — H&P ADULT - PROBLEM SELECTOR PLAN 3
p/w dark stool   HH stable   monitor CBC p/w dark stool patient is on ferrous sulfate   HH stable   monitor CBC hx liver cirrhosis 2/2 alcohol abuse, multiple hospitalization for encephalopathy    on spironolactone and lactulose  Asterixes on examination    hypotensive likely due to splanchnic vasodilation, consider albumin infusion    MELD 18   INR: 1.8   CTA/P showing ascites   will hold spironolactone for now  c/w lactulose to have 2-3 bowel movements. May titrate down dose   f/u ammonia level  f/u MELD score

## 2023-09-15 NOTE — H&P ADULT - PROBLEM SELECTOR PLAN 8
scd - Bilirubin: 2  - CT A/P: Biliary ductal dilation unchanged from prior  - f/u direct and total bilirubin levels   - Hepatology consulted Dr Caballero

## 2023-09-15 NOTE — ED PROVIDER NOTE - CLINICAL SUMMARY MEDICAL DECISION MAKING FREE TEXT BOX
Patient with history of pancreatitis, diffuse abdominal pain, dysphagia, tachycardia concern for pancreatitis, perforated viscus versus other abdominal pathology I.  Patient's chest pain is mostly atypical, given history of hypertension, will get troponins, admission Patient with history of pancreatitis, diffuse abdominal pain, dysphagia, tachycardia concern for ascites, pancreatitis, perforated viscus versus other abdominal pathology. Patient's chest pain is mostly atypical, given history of hypertension, will get troponin, admission

## 2023-09-16 NOTE — CONSULT NOTE ADULT - ASSESSMENT
_________CNS___________  Pt a&ox3 at baseline  currently intubated, sedated    _________CVS___________  #Sepsis   secondary to SBP (confirmed on 9/15 with Diagnostic Para > 9000 Neutrophils)  s/p 2L NS in ED   Tachycardic     _________ RESP__________  #AHRF   CT A/P/C: basilar atelectasis,   started on Zosyn on admission  Increased work of breathing, tachy  sating 92% on 10L o2  increased secretions coughing  Intubated on vent  c/w daily ABG, wean 02    __________GI____________  #Alcoholic Cirrhosis  multiple hospitalization for encephalopathy    on spironolactone and lactulose  hypotensive likely due to splanchnic vasodilation, consider albumin infusion    MELD 18   INR: 1.8   CTA/P showing ascites   will hold spironolactone for now  c/w lactulose to have 2-3 bowel movements. May titrate down dose   f/u ammonia level  f/u MELD score.  s/p diagnostic paracentesis 9/15- r/o SBP    #SBP  confirmed- s/p diagnostic para in ED on 9/15 with > 9000 neutrophils   f/u peritoneal fluid culture       ________ RENAL__________  #KAY  1.7   baseline normal from 07/2023  avoid nephrotoxic meds  trend Cr daily     _________MSK___________  Generalized weakness  No focal deficit     __________ID____________  started on Zosyn  f/u ID recs     _________ENDO__________  #Hypoglycemic  s/p 2 amps D50  monitor BG    _______HEME/ONC_______  #Anemia  monitor HBG dropped by 2    _________SKIN____________  Multiple area of echymmosis on UE  fragile skin    ________Prophylaxis_______  #DVT scd  #GI     ________GOC/ DISPO_______  ICU

## 2023-09-16 NOTE — PROGRESS NOTE ADULT - SUBJECTIVE AND OBJECTIVE BOX
S: Patient continues to be encephalopathic. He is still complaining of abdominal pain and fidgeting with the blankets in the bed. Otherwise no new symptoms    O:  Vital Signs Last 24 Hrs  T(C): 36.3 (16 Sep 2023 14:36), Max: 36.6 (15 Sep 2023 15:15)  T(F): 97.4 (16 Sep 2023 14:36), Max: 97.8 (15 Sep 2023 15:15)  HR: 119 (16 Sep 2023 14:36) (86 - 133)  BP: 127/77 (16 Sep 2023 14:36) (89/66 - 127/77)  BP(mean): --  RR: 20 (16 Sep 2023 14:36) (20 - 25)  SpO2: 92% (16 Sep 2023 14:36) (92% - 100%)    Parameters below as of 16 Sep 2023 14:36  Patient On (Oxygen Delivery Method): room air        GENERAL: patient is a chronically ill appearing male who is encephalopathic. Mild distress   HEAD:  Atraumatic, Normocephalic  EYES: EOMI, PERRLA, conjunctiva and sclera clear  MOUTH: on oral inspection, it appears that the patient's epiglottis is visible and he is making gurgling sounds  NECK: Supple, No JVD  CHEST/LUNG: Clear to auscultation bilaterally; No wheeze  HEART: tachycardic with a regular rhythm; No murmurs, rubs, or gallops  ABDOMEN: distended abdomen that is diffusely tender to palpation   EXTREMITIES:  2+ Peripheral Pulses, No clubbing, cyanosis, or edema  PSYCH: mildly agitated   NEUROLOGY: non-focal; patient is not oriented to place or situation and struggles to follows commands   SKIN: No rashes or lesions; slight icterus of skin is noted    acetaminophen     Tablet .. 650 milliGRAM(s) Oral every 6 hours PRN  lactulose Syrup 30 Gram(s) Oral three times a day  piperacillin/tazobactam IVPB.. 3.375 Gram(s) IV Intermittent every 8 hours                            12.5   6.53  )-----------( 171      ( 16 Sep 2023 04:55 )             38.2       09-16    134<L>  |  102  |  23<H>  ----------------------------<  119<H>  4.2   |  17<L>  |  1.68<H>    Ca    9.1      16 Sep 2023 14:09  Phos  3.2     09-16  Mg     1.3     09-16    TPro  6.1  /  Alb  1.6<L>  /  TBili  1.4<H>  /  DBili  1.0<H>  /  AST  14  /  ALT  9<L>  /  AlkPhos  219<H>  09-16

## 2023-09-16 NOTE — GOALS OF CARE CONVERSATION - ADVANCED CARE PLANNING - CONVERSATION DETAILS
Patient, his family - both spouse Shannon and daughter Katalina - wish for full treatment. Specifically patient would like resuscitation/full ACLS support, intubation, renal replacement therapy.

## 2023-09-16 NOTE — CONSULT NOTE ADULT - ATTENDING COMMENTS
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 As per wifehe has no  cough, runny nose. Patient has been having black stools for the past 2 days  with dark-colored urine. He also endorse chest pain, midsternal  Denies any hematemesis, fevers, shortness of breath, N/V/D.      9/16- ICU consulted for increased secretions, tachycardia, tachypnea, at risk for airway compromise with acute hypoxic resp failure using accessory muscle , will intubate and wife agreed       ASSESSMENT     - Acute Hypoxic Resp failure   - AMS / Encephalopathy   - Hypotension   - KAY on CKD with possibility for Hepatorenal syndrome   - Alcohol inappropriate use   - Liver cirrhosis   - SBP  - Sepsis   - GBS           Plan     - Transfer to ICU   - Emergently intubated in ICU   - Sedated and pain control for vent synchrony and comfort   - Continue vent support , adjust as per ABG   - GBS   - Hemodynamic monitoring   - Will start pressors to maintain MAP>65 as needed   - IV antibx to cover GNR in SBP  - F/U cultures and peritoneal fluid   - Albumin   - Trend LFT and NH4  - GI eval   - No NGT due to stomach AVM and possible varices       discussed with wife and daughters at bedside

## 2023-09-16 NOTE — PROGRESS NOTE ADULT - ASSESSMENT
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 Presentation is concerning for sepsis – considering spontaneous bacterial peritonitis given the presence of ascites and diffuse abdominal pain– precipitating hepatic encephalopathy.      Certainly considering a possible upper GI bleed as well given report of dark stools, but 2 days of melena would certainly expect to be enough time to see a decline in Hgb should there be a clinically significant bleed. Will trend CBC.     I have reviewed BMP and CBC. Hgb has declined from 14 to 12.5 (patient did receive 2 L of crystalloids on 9/15). renal function is normal with BUN 18 and Cr 1. Na and K are normal. Lactic acid has worsened with lactate now 3.8. INR is 2.1    I am consulting ICU on 9/16 given overall appearance and clinical trajectory    #Sepsis secondary to SBP (confirmed on 9/15 with Diagnostic Para > 9000 Neutrophils)  #Lactic Acidosis, worsening   #Tachycardia   #Hepatic Encephalopathy    #Cirrhosis   #Hyponatremia   #Pseudohyperkalemia    #Hypoglycemia s/p D50   #Hypothyroidism     -patient s/p 2 L NS in ED  -1.5 gram/kg of albumin given on 9/16 as day albumin protocol for SBP  -s/p diagnostic para in ED on 9/15 with > 9000 neutrophils   -blood cultures x 2 and UA/urine culture   -continue Zosyn    -f/u peritoneal fluid culture   -trend lactic acid, though may not normalize post resuscitation given concomitant cirrhosis  -trend CBC   -hold home antihypertensives   -lactulose aiming for 3-5 BMs and treat underlying causes (likely infection)   -Q4 hour glucose checks .  -f/u any additional recommendations from the ICU team

## 2023-09-16 NOTE — PROCEDURE NOTE - NSTRACHINTUBMED_RESP_A_CORE
fentaNYL injectable/etomidate injectable/propofol injectable fentanyl  25 mcg ivp x 1/fentaNYL injectable/etomidate injectable/propofol injectable

## 2023-09-16 NOTE — CONSULT NOTE ADULT - SUBJECTIVE AND OBJECTIVE BOX
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 As per wifehe has no  cough, runny nose. Patient has been having black stools for the past 2 days  with dark-colored urine. He also endorse chest pain, midsternal  Denies any hematemesis, fevers, shortness of breath, N/V/D.      9/16- ICU consulted for increased secretions, tachycardia, tachypnea, at risk for airway compromise.     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)      Pancreatitis  2/2 choledocholithiasis s/p ERCP with stone extraction and plastic stent placement (11/2019, stent now removed)      Hematochezia  2/2 colonic AVMs s/p cauterization and hemorrhoids (11/2019)      Melena  2/2 duodenal ulcers s/p argon plasma coagulation (4/2020)      Chronic kidney disease (CKD)      GERD (gastroesophageal reflux disease)      Hypothyroidism      H/O inguinal hernia repair      History of repair of hip fracture  R hip      History of hip replacement  10/2020      H/O spinal stenosis          SOCIAL HX:   Smoking                         ETOH                            Other    FAMILY HISTORY:  Family history of ovarian cancer (Sibling)    :  No known cardiovacular family hisotry     ROS:  See HPI     Allergies    No Known Allergies    Intolerances          PHYSICAL EXAM    ICU Vital Signs Last 24 Hrs  T(C): 36.3 (16 Sep 2023 14:36), Max: 36.6 (16 Sep 2023 07:56)  T(F): 97.4 (16 Sep 2023 14:36), Max: 97.8 (16 Sep 2023 07:56)  HR: 92 (16 Sep 2023 16:27) (86 - 133)  BP: 127/77 (16 Sep 2023 14:36) (89/66 - 127/77)  BP(mean): --  ABP: --  ABP(mean): --  RR: 20 (16 Sep 2023 14:36) (20 - 25)  SpO2: 100% (16 Sep 2023 16:27) (92% - 100%)    O2 Parameters below as of 16 Sep 2023 14:36  Patient On (Oxygen Delivery Method): room air            General: +respiratoy distress, RR 26  HEENT:  RICHARD              Lymphatic system: No LN  Lungs: Course breath sounds b/l   Cardiovascular: Tachycardic  Gastrointestinal: Soft, Positive BS  Musculoskeletal: No clubbing.  Moves all extremities.    Skin: Warm.  Intact  Neurological: Alert able to verbalize, No motor or sensory deficit         LABS:                          10.5   10.53 )-----------( 145      ( 16 Sep 2023 15:38 )             31.5                                               09-16    136  |  102  |  23<H>  ----------------------------<  120<H>  3.9   |  17<L>  |  1.76<H>    Ca    9.1      16 Sep 2023 15:38  Phos  4.0     09-16  Mg     1.5     09-16    TPro  6.8  /  Alb  3.9  /  TBili  1.8<H>  /  DBili  x   /  AST  8<L>  /  ALT  8<L>  /  AlkPhos  142<H>  09-16      PT/INR - ( 16 Sep 2023 15:38 )   PT: 27.7 sec;   INR: 2.50 ratio         PTT - ( 16 Sep 2023 15:38 )  PTT:45.3 sec                                       Urinalysis Basic - ( 16 Sep 2023 15:38 )    Color: x / Appearance: x / SG: x / pH: x  Gluc: 120 mg/dL / Ketone: x  / Bili: x / Urobili: x   Blood: x / Protein: x / Nitrite: x   Leuk Esterase: x / RBC: x / WBC x   Sq Epi: x / Non Sq Epi: x / Bacteria: x        CARDIAC MARKERS ( 15 Sep 2023 12:40 )  x     / x     / 116 U/L / x     / x                                                LIVER FUNCTIONS - ( 16 Sep 2023 15:38 )  Alb: 3.9 g/dL / Pro: 6.8 g/dL / ALK PHOS: 142 U/L / ALT: 8 U/L DA / AST: 8 U/L / GGT: x                                                  Culture - Body Fluid with Gram Stain (collected 15 Sep 2023 19:50)  Source: Abdominal Fl Abdominal Fluid  Gram Stain (16 Sep 2023 03:16):    polymorphonuclear leukocytes seen    No organisms seen    by cytocentrifuge                                                                                         ABG - ( 16 Sep 2023 15:40 )  pH, Arterial: 7.29  pH, Blood: x     /  pCO2: 35    /  pO2: 129   / HCO3: 17    / Base Excess: -8.9  /  SaO2: 99                  CXR:   While there is extraneous artifact overlying the lower thoracic field-of-view on the left greater than right sides, there is a shallow inspiratory effort but no evidence of pneumonia or infiltrate on either side and there is no pneumothorax. There are no pleural effusions. The right hemidiaphragm is slightly elevated. There is no hilar or mediastinal widening. The cardiac silhouette is not enlarged and there is no CHF. There is an atherosclerotic aorta. The bony thorax remains stable.    IMPRESSION:  1. Shallow inspiratory effort with no acute cardiopulmonary abnormality seen.  2. No evidence of pneumonia.  3. Mild elevation of the right hemidiaphragm.    --- End of Report ---    ECHO:     MEDICATIONS  (STANDING):  etomidate Injectable 20 milliGRAM(s) IV Push once  fentaNYL   Infusion 0.5 MICROgram(s)/kG/Hr (3.27 mL/Hr) IV Continuous <Continuous>  furosemide   Injectable 80 milliGRAM(s) IV Push once  lactulose Syrup 30 Gram(s) Oral three times a day  piperacillin/tazobactam IVPB.. 3.375 Gram(s) IV Intermittent every 8 hours  propofol Infusion 10 MICROgram(s)/kG/Min (3.92 mL/Hr) IV Continuous <Continuous>    MEDICATIONS  (PRN):  acetaminophen     Tablet .. 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 As per wifehe has no  cough, runny nose. Patient has been having black stools for the past 2 days  with dark-colored urine. He also endorse chest pain, midsternal  Denies any hematemesis, fevers, shortness of breath, N/V/D.      9/16- ICU consulted for increased secretions, tachycardia, tachypnea, at risk for airway compromise.     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)      Pancreatitis  2/2 choledocholithiasis s/p ERCP with stone extraction and plastic stent placement (11/2019, stent now removed)      Hematochezia  2/2 colonic AVMs s/p cauterization and hemorrhoids (11/2019)      Melena  2/2 duodenal ulcers s/p argon plasma coagulation (4/2020)      Chronic kidney disease (CKD)      GERD (gastroesophageal reflux disease)      Hypothyroidism      H/O inguinal hernia repair      History of repair of hip fracture  R hip      History of hip replacement  10/2020      H/O spinal stenosis          SOCIAL HX:   Smoking           former alcohol use               ETOH                            Other    FAMILY HISTORY:  Family history of ovarian cancer (Sibling)    :  No known cardiovacular family hisotry     ROS:  See HPI     Allergies    No Known Allergies    Intolerances          PHYSICAL EXAM    ICU Vital Signs Last 24 Hrs  T(C): 36.3 (16 Sep 2023 14:36), Max: 36.6 (16 Sep 2023 07:56)  T(F): 97.4 (16 Sep 2023 14:36), Max: 97.8 (16 Sep 2023 07:56)  HR: 92 (16 Sep 2023 16:27) (86 - 133)  BP: 127/77 (16 Sep 2023 14:36) (89/66 - 127/77)  BP(mean): --  ABP: --  ABP(mean): --  RR: 20 (16 Sep 2023 14:36) (20 - 25)  SpO2: 100% (16 Sep 2023 16:27) (92% - 100%)    O2 Parameters below as of 16 Sep 2023 14:36  Patient On (Oxygen Delivery Method): room air            General: +respiratoy distress, RR 26  HEENT:  PERRLA   using accessory muscle for resp        Lymphatic system: No LN  Lungs: Course breath sounds b/l   Cardiovascular: Tachycardic  Gastrointestinal: Soft, Positive BS  Musculoskeletal: No clubbing.  Moves all extremities.    Skin: Warm.  Intact  Neurological: Alert able to verbalize, No motor or sensory deficit         LABS:                          10.5   10.53 )-----------( 145      ( 16 Sep 2023 15:38 )             31.5                                               09-16    136  |  102  |  23<H>  ----------------------------<  120<H>  3.9   |  17<L>  |  1.76<H>    Ca    9.1      16 Sep 2023 15:38  Phos  4.0     09-16  Mg     1.5     09-16    TPro  6.8  /  Alb  3.9  /  TBili  1.8<H>  /  DBili  x   /  AST  8<L>  /  ALT  8<L>  /  AlkPhos  142<H>  09-16      PT/INR - ( 16 Sep 2023 15:38 )   PT: 27.7 sec;   INR: 2.50 ratio         PTT - ( 16 Sep 2023 15:38 )  PTT:45.3 sec                                       Urinalysis Basic - ( 16 Sep 2023 15:38 )    Color: x / Appearance: x / SG: x / pH: x  Gluc: 120 mg/dL / Ketone: x  / Bili: x / Urobili: x   Blood: x / Protein: x / Nitrite: x   Leuk Esterase: x / RBC: x / WBC x   Sq Epi: x / Non Sq Epi: x / Bacteria: x        CARDIAC MARKERS ( 15 Sep 2023 12:40 )  x     / x     / 116 U/L / x     / x                                                LIVER FUNCTIONS - ( 16 Sep 2023 15:38 )  Alb: 3.9 g/dL / Pro: 6.8 g/dL / ALK PHOS: 142 U/L / ALT: 8 U/L DA / AST: 8 U/L / GGT: x                                                  Culture - Body Fluid with Gram Stain (collected 15 Sep 2023 19:50)  Source: Abdominal Fl Abdominal Fluid  Gram Stain (16 Sep 2023 03:16):    polymorphonuclear leukocytes seen    No organisms seen    by cytocentrifuge                                                                                         ABG - ( 16 Sep 2023 15:40 )  pH, Arterial: 7.29  pH, Blood: x     /  pCO2: 35    /  pO2: 129   / HCO3: 17    / Base Excess: -8.9  /  SaO2: 99                  CXR:   While there is extraneous artifact overlying the lower thoracic field-of-view on the left greater than right sides, there is a shallow inspiratory effort but no evidence of pneumonia or infiltrate on either side and there is no pneumothorax. There are no pleural effusions. The right hemidiaphragm is slightly elevated. There is no hilar or mediastinal widening. The cardiac silhouette is not enlarged and there is no CHF. There is an atherosclerotic aorta. The bony thorax remains stable.    IMPRESSION:  1. Shallow inspiratory effort with no acute cardiopulmonary abnormality seen.  2. No evidence of pneumonia.  3. Mild elevation of the right hemidiaphragm.    --- End of Report ---    ECHO:     MEDICATIONS  (STANDING):  etomidate Injectable 20 milliGRAM(s) IV Push once  fentaNYL   Infusion 0.5 MICROgram(s)/kG/Hr (3.27 mL/Hr) IV Continuous <Continuous>  furosemide   Injectable 80 milliGRAM(s) IV Push once  lactulose Syrup 30 Gram(s) Oral three times a day  piperacillin/tazobactam IVPB.. 3.375 Gram(s) IV Intermittent every 8 hours  propofol Infusion 10 MICROgram(s)/kG/Min (3.92 mL/Hr) IV Continuous <Continuous>    MEDICATIONS  (PRN):  acetaminophen     Tablet .. 650 milliGRAM(s) Oral every 6 hours PRN Temp greater or equal to 38C (100.4F), Mild Pain (1 - 3)

## 2023-09-17 NOTE — PROGRESS NOTE ADULT - ATTENDING COMMENTS
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 As per wifehe has no  cough, runny nose. Patient has been having black stools for the past 2 days  with dark-colored urine. He also endorse chest pain, midsternal  Denies any hematemesis, fevers, shortness of breath, N/V/D.      9/16- ICU consulted for increased secretions, tachycardia, tachypnea, at risk for airway compromise with acute hypoxic resp failure using accessory muscle , will intubate and wife agreed       ASSESSMENT     - Acute Hypoxic Resp failure   - AMS / Encephalopathy   - Hypotension   - KAY on CKD with possibility for Hepatorenal syndrome   - Alcohol inappropriate use   - Liver cirrhosis   - SBP  - Sepsis   - GBS           Plan   - Sedated and pain control for vent synchrony and comfort   - Continue vent support , adjust as per ABG   - Hemodynamic support   - Titrate vaso-pressors to maintain MAP>65 as needed   - IV antibx to cover GNR in SBP  - F/U cultures and peritoneal fluid   - Albumin   - Trend LFT and NH4  - GI eval   - No NGT due to stomach AVM and possible varices   - DVT PI prophy

## 2023-09-17 NOTE — PROGRESS NOTE ADULT - SUBJECTIVE AND OBJECTIVE BOX
INTERVAL HPI/OVERNIGHT EVENTS:     PRESSORS: [ ] YES [ ] NO  WHICH:    Antimicrobial:  piperacillin/tazobactam IVPB.. 3.375 Gram(s) IV Intermittent every 8 hours    Cardiovascular:  norepinephrine Infusion 0.05 MICROgram(s)/kG/Min IV Continuous <Continuous>    Pulmonary:    Hematalogic:    Other:  acetaminophen     Tablet .. 650 milliGRAM(s) Oral every 6 hours PRN  chlorhexidine 0.12% Liquid 15 milliLiter(s) Oral Mucosa every 12 hours  chlorhexidine 2% Cloths 1 Application(s) Topical <User Schedule>  fentaNYL   Infusion 0.5 MICROgram(s)/kG/Hr IV Continuous <Continuous>  propofol Infusion 10.098 MICROgram(s)/kG/Min IV Continuous <Continuous>  sodium chloride 0.9% lock flush 10 milliLiter(s) IV Push every 1 hour PRN    acetaminophen     Tablet .. 650 milliGRAM(s) Oral every 6 hours PRN  chlorhexidine 0.12% Liquid 15 milliLiter(s) Oral Mucosa every 12 hours  chlorhexidine 2% Cloths 1 Application(s) Topical <User Schedule>  fentaNYL   Infusion 0.5 MICROgram(s)/kG/Hr IV Continuous <Continuous>  norepinephrine Infusion 0.05 MICROgram(s)/kG/Min IV Continuous <Continuous>  piperacillin/tazobactam IVPB.. 3.375 Gram(s) IV Intermittent every 8 hours  propofol Infusion 10.098 MICROgram(s)/kG/Min IV Continuous <Continuous>  sodium chloride 0.9% lock flush 10 milliLiter(s) IV Push every 1 hour PRN    Drug Dosing Weight  Height (cm): 170.2 (15 Sep 2023 11:37)  Weight (kg): 64.7 (16 Sep 2023 16:30)  BMI (kg/m2): 22.3 (16 Sep 2023 16:30)  BSA (m2): 1.75 (16 Sep 2023 16:30)    CENTRAL LINE: [ ] YES [ ] NO  LOCATION:   DATE INSERTED:  REMOVE: [ ] YES [ ] NO  EXPLAIN:    REYNOLDS: [ ] YES [ ] NO    DATE INSERTED:  REMOVE:  [ ] YES [ ] NO  EXPLAIN:    A-LINE:  [ ] YES [ ] NO  LOCATION:   DATE INSERTED:  REMOVE:  [ ] YES [ ] NO  EXPLAIN:    PMH -reviewed admission note, no change since admission  PAST MEDICAL & SURGICAL HISTORY:  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      Liver cirrhosis  alcoholic/WALL cirrhosis c/b variceal bleed s/p banding (8/2018) and hepatic encephalopathy (several years ago)      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)      Pancreatitis  2/2 choledocholithiasis s/p ERCP with stone extraction and plastic stent placement (11/2019, stent now removed)      Hematochezia  2/2 colonic AVMs s/p cauterization and hemorrhoids (11/2019)      Melena  2/2 duodenal ulcers s/p argon plasma coagulation (4/2020)      Chronic kidney disease (CKD)      GERD (gastroesophageal reflux disease)      Hypothyroidism      H/O inguinal hernia repair      History of repair of hip fracture  R hip      History of hip replacement  10/2020      H/O spinal stenosis          ICU Vital Signs Last 24 Hrs  T(C): 36.4 (17 Sep 2023 03:00), Max: 36.6 (16 Sep 2023 07:56)  T(F): 97.5 (17 Sep 2023 03:00), Max: 97.8 (16 Sep 2023 07:56)  HR: 69 (17 Sep 2023 03:00) (52 - 133)  BP: 90/58 (17 Sep 2023 03:00) (72/55 - 144/83)  BP(mean): 69 (17 Sep 2023 03:00) (62 - 112)  ABP: --  ABP(mean): --  RR: 16 (17 Sep 2023 03:00) (14 - 20)  SpO2: 100% (17 Sep 2023 03:00) (83% - 100%)    O2 Parameters below as of 17 Sep 2023 03:00  Patient On (Oxygen Delivery Method): ventilator    O2 Concentration (%): 50        ABG - ( 16 Sep 2023 15:40 )  pH, Arterial: 7.29  pH, Blood: x     /  pCO2: 35    /  pO2: 129   / HCO3: 17    / Base Excess: -8.9  /  SaO2: 99                        Mode: AC/ CMV (Assist Control/ Continuous Mandatory Ventilation)  RR (machine): 16  TV (machine): 400  FiO2: 50  PEEP: 5  ITime: 1  MAP: 9  PIP: 25      PHYSICAL EXAM:    General: +respiratoy distress, RR 26  HEENT:  PERRLA   using accessory muscle for resp        Lymphatic system: No LN  Lungs: Course breath sounds b/l   Cardiovascular: Tachycardic  Gastrointestinal: Soft, Positive BS  Musculoskeletal: No clubbing.  Moves all extremities.    Skin: Warm.  Intact  Neurological: Alert able to verbalize, No motor or sensory deficit       LABS:  CBC Full  -  ( 16 Sep 2023 20:27 )  WBC Count : 10.90 K/uL  RBC Count : 3.63 M/uL  Hemoglobin : 9.6 g/dL  Hematocrit : 28.9 %  Platelet Count - Automated : 138 K/uL  Mean Cell Volume : 79.6 fl  Mean Cell Hemoglobin : 26.4 pg  Mean Cell Hemoglobin Concentration : 33.2 gm/dL  Auto Neutrophil # : x  Auto Lymphocyte # : x  Auto Monocyte # : x  Auto Eosinophil # : x  Auto Basophil # : x  Auto Neutrophil % : x  Auto Lymphocyte % : x  Auto Monocyte % : x  Auto Eosinophil % : x  Auto Basophil % : x    09-16    138  |  106  |  21<H>  ----------------------------<  138<H>  3.4<L>   |  18<L>  |  1.51<H>    Ca    8.1<L>      16 Sep 2023 20:27  Phos  2.9     09-16  Mg     1.3     09-16    TPro  5.7<L>  /  Alb  3.2<L>  /  TBili  1.8<H>  /  DBili  x   /  AST  10  /  ALT  7<L>  /  AlkPhos  125<H>  09-16    PT/INR - ( 16 Sep 2023 15:38 )   PT: 27.7 sec;   INR: 2.50 ratio         PTT - ( 16 Sep 2023 15:38 )  PTT:45.3 sec  Urinalysis Basic - ( 16 Sep 2023 20:27 )    Color: x / Appearance: x / SG: x / pH: x  Gluc: 138 mg/dL / Ketone: x  / Bili: x / Urobili: x   Blood: x / Protein: x / Nitrite: x   Leuk Esterase: x / RBC: x / WBC x   Sq Epi: x / Non Sq Epi: x / Bacteria: x      Culture Results:   No growth (09-15 @ 19:50)  Culture Results:   Growth in aerobic bottle: Gram Positive Cocci in Clusters  Direct identification is available within approximately 3-5  hours either by Blood Panel Multiplexed PCR or Direct  MALDI-TOF. Details: [email or web address] (09-15 @ 14:10)  Culture Results:   No growth at 24 hours (09-15 @ 14:00)      RADIOLOGY & ADDITIONAL STUDIES REVIEWED:  ***    [ ]GOALS OF CARE DISCUSSION WITH PATIENT/FAMILY/PROXY:    CRITICAL CARE TIME SPENT: 35 minutes INTERVAL HPI/OVERNIGHT EVENTS:   No acute events overnight.  Patient examined at bedside this AM.  He is currently intubated on propofol and fentanyl drip.    PRESSORS: [ ] YES [ ] NO  WHICH:    Antimicrobial:  piperacillin/tazobactam IVPB.. 3.375 Gram(s) IV Intermittent every 8 hours    Cardiovascular:  norepinephrine Infusion 0.05 MICROgram(s)/kG/Min IV Continuous <Continuous>    Pulmonary:    Hematalogic:    Other:  acetaminophen     Tablet .. 650 milliGRAM(s) Oral every 6 hours PRN  chlorhexidine 0.12% Liquid 15 milliLiter(s) Oral Mucosa every 12 hours  chlorhexidine 2% Cloths 1 Application(s) Topical <User Schedule>  fentaNYL   Infusion 0.5 MICROgram(s)/kG/Hr IV Continuous <Continuous>  propofol Infusion 10.098 MICROgram(s)/kG/Min IV Continuous <Continuous>  sodium chloride 0.9% lock flush 10 milliLiter(s) IV Push every 1 hour PRN    acetaminophen     Tablet .. 650 milliGRAM(s) Oral every 6 hours PRN  chlorhexidine 0.12% Liquid 15 milliLiter(s) Oral Mucosa every 12 hours  chlorhexidine 2% Cloths 1 Application(s) Topical <User Schedule>  fentaNYL   Infusion 0.5 MICROgram(s)/kG/Hr IV Continuous <Continuous>  norepinephrine Infusion 0.05 MICROgram(s)/kG/Min IV Continuous <Continuous>  piperacillin/tazobactam IVPB.. 3.375 Gram(s) IV Intermittent every 8 hours  propofol Infusion 10.098 MICROgram(s)/kG/Min IV Continuous <Continuous>  sodium chloride 0.9% lock flush 10 milliLiter(s) IV Push every 1 hour PRN    Drug Dosing Weight  Height (cm): 170.2 (15 Sep 2023 11:37)  Weight (kg): 64.7 (16 Sep 2023 16:30)  BMI (kg/m2): 22.3 (16 Sep 2023 16:30)  BSA (m2): 1.75 (16 Sep 2023 16:30)    CENTRAL LINE: [X] YES [ ] NO  LOCATION: Right IJ   DATE INSERTED: 9/16  REMOVE: [ ] YES [ ] NO  EXPLAIN:    REYNOLDS: [X] YES [ ] NO    DATE INSERTED:  REMOVE:  [ ] YES [ ] NO  EXPLAIN:    A-LINE:  [ ] YES [X] NO  LOCATION:   DATE INSERTED:  REMOVE:  [ ] YES [ ] NO  EXPLAIN:    PMH -reviewed admission note, no change since admission  PAST MEDICAL & SURGICAL HISTORY:  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      Liver cirrhosis  alcoholic/WALL cirrhosis c/b variceal bleed s/p banding (8/2018) and hepatic encephalopathy (several years ago)      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)      Pancreatitis  2/2 choledocholithiasis s/p ERCP with stone extraction and plastic stent placement (11/2019, stent now removed)      Hematochezia  2/2 colonic AVMs s/p cauterization and hemorrhoids (11/2019)      Melena  2/2 duodenal ulcers s/p argon plasma coagulation (4/2020)      Chronic kidney disease (CKD)      GERD (gastroesophageal reflux disease)      Hypothyroidism      H/O inguinal hernia repair      History of repair of hip fracture  R hip      History of hip replacement  10/2020      H/O spinal stenosis          ICU Vital Signs Last 24 Hrs  T(C): 36.4 (17 Sep 2023 03:00), Max: 36.6 (16 Sep 2023 07:56)  T(F): 97.5 (17 Sep 2023 03:00), Max: 97.8 (16 Sep 2023 07:56)  HR: 69 (17 Sep 2023 03:00) (52 - 133)  BP: 90/58 (17 Sep 2023 03:00) (72/55 - 144/83)  BP(mean): 69 (17 Sep 2023 03:00) (62 - 112)  ABP: --  ABP(mean): --  RR: 16 (17 Sep 2023 03:00) (14 - 20)  SpO2: 100% (17 Sep 2023 03:00) (83% - 100%)    O2 Parameters below as of 17 Sep 2023 03:00  Patient On (Oxygen Delivery Method): ventilator    O2 Concentration (%): 50        ABG - ( 16 Sep 2023 15:40 )  pH, Arterial: 7.29  pH, Blood: x     /  pCO2: 35    /  pO2: 129   / HCO3: 17    / Base Excess: -8.9  /  SaO2: 99                        Mode: AC/ CMV (Assist Control/ Continuous Mandatory Ventilation)  RR (machine): 16  TV (machine): 400  FiO2: 50  PEEP: 5  ITime: 1  MAP: 9  PIP: 25      PHYSICAL EXAM:    General: Intubated on ventilator, sedated  HEENT:  PERRLA  Lymphatic system: No LN  Lungs: Course breath sounds b/l   Cardiovascular: Tachycardic  Gastrointestinal: Soft, Positive BS  Musculoskeletal: No clubbing  Skin: Warm.  Intact  Neurological: Sedated, RASS -4 to -5      LABS:  CBC Full  -  ( 16 Sep 2023 20:27 )  WBC Count : 10.90 K/uL  RBC Count : 3.63 M/uL  Hemoglobin : 9.6 g/dL  Hematocrit : 28.9 %  Platelet Count - Automated : 138 K/uL  Mean Cell Volume : 79.6 fl  Mean Cell Hemoglobin : 26.4 pg  Mean Cell Hemoglobin Concentration : 33.2 gm/dL  Auto Neutrophil # : x  Auto Lymphocyte # : x  Auto Monocyte # : x  Auto Eosinophil # : x  Auto Basophil # : x  Auto Neutrophil % : x  Auto Lymphocyte % : x  Auto Monocyte % : x  Auto Eosinophil % : x  Auto Basophil % : x    09-16    138  |  106  |  21<H>  ----------------------------<  138<H>  3.4<L>   |  18<L>  |  1.51<H>    Ca    8.1<L>      16 Sep 2023 20:27  Phos  2.9     09-16  Mg     1.3     09-16    TPro  5.7<L>  /  Alb  3.2<L>  /  TBili  1.8<H>  /  DBili  x   /  AST  10  /  ALT  7<L>  /  AlkPhos  125<H>  09-16    PT/INR - ( 16 Sep 2023 15:38 )   PT: 27.7 sec;   INR: 2.50 ratio         PTT - ( 16 Sep 2023 15:38 )  PTT:45.3 sec  Urinalysis Basic - ( 16 Sep 2023 20:27 )    Color: x / Appearance: x / SG: x / pH: x  Gluc: 138 mg/dL / Ketone: x  / Bili: x / Urobili: x   Blood: x / Protein: x / Nitrite: x   Leuk Esterase: x / RBC: x / WBC x   Sq Epi: x / Non Sq Epi: x / Bacteria: x      Culture Results:   No growth (09-15 @ 19:50)  Culture Results:   Growth in aerobic bottle: Gram Positive Cocci in Clusters  Direct identification is available within approximately 3-5  hours either by Blood Panel Multiplexed PCR or Direct  MALDI-TOF. Details: [email or web address] (09-15 @ 14:10)  Culture Results:   No growth at 24 hours (09-15 @ 14:00)      RADIOLOGY & ADDITIONAL STUDIES REVIEWED:  ***    [ ]GOALS OF CARE DISCUSSION WITH PATIENT/FAMILY/PROXY:    CRITICAL CARE TIME SPENT: 35 minutes

## 2023-09-17 NOTE — PATIENT PROFILE ADULT - FUNCTIONAL ASSESSMENT - BASIC MOBILITY 6.
1-calculated by average/Not able to assess (calculate score using [facility] averaging method)

## 2023-09-17 NOTE — PROGRESS NOTE ADULT - ASSESSMENT
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 As per wifehe has no  cough, runny nose. Patient has been having black stools for the past 2 days. Pt noted to have increased secretions, tachycardia, tachypnea and AHRF with increased WOB. Admitted to ICU for AHRF requiring intubation for airway protection.   # Acute Hypoxic Resp failure   # AMS / Encephalopathy   # Hypotension   # KAY on CKD with possibility for Hepatorenal syndrome   # Alcohol inappropriate use   # Liver cirrhosis   # SBP  # Sepsis   # GBS     _________CNS___________  Pt a&ox3 at baseline  currently intubated, sedated    _________CVS___________  #Sepsis   secondary to SBP (confirmed on 9/15 with Diagnostic Para > 9000 Neutrophils)  s/p 2L NS in ED   Tachycardic     _________ RESP__________  #AHRF   CT A/P/C: basilar atelectasis,   started on Zosyn on admission  Increased work of breathing, tachy  sating 92% on 10L o2  increased secretions coughing  Intubated on vent  c/w daily ABG, wean 02    __________GI____________  #Alcoholic Cirrhosis  multiple hospitalization for encephalopathy    on spironolactone and lactulose  hypotensive likely due to splanchnic vasodilation, consider albumin infusion    MELD 18   INR: 1.8   CTA/P showing ascites   will hold spironolactone for now  c/w lactulose to have 2-3 bowel movements. May titrate down dose   f/u ammonia level  f/u MELD score.  s/p diagnostic paracentesis 9/15- r/o SBP    #SBP  confirmed- s/p diagnostic para in ED on 9/15 with > 9000 neutrophils   f/u peritoneal fluid culture       ________ RENAL__________  #KAY  1.7   baseline normal from 07/2023  avoid nephrotoxic meds  trend Cr daily     _________MSK___________  Generalized weakness  No focal deficit     __________ID____________  started on Zosyn  f/u ID recs     _________ENDO__________  #Hypoglycemic  s/p 2 amps D50  monitor BG    _______HEME/ONC_______  #Anemia  monitor HBG dropped by 2    _________SKIN____________  Multiple area of echymmosis on UE  fragile skin    ________Prophylaxis_______  #DVT scd  #GI     ________GOC/ DISPO_______  ICU 83 year old  Man  from home, ambulates with walker with  hypothyroidism, alcoholic cirrhosis, duodenal ulcers, AVM's, CKD, GBS, 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 As per wifehe has no  cough, runny nose. Patient has been having black stools for the past 2 days. Pt noted to have increased secretions, tachycardia, tachypnea and AHRF with increased WOB. Admitted to ICU for AHRF requiring intubation for airway protection.   # Acute Hypoxic Resp failure   # AMS / Encephalopathy   # Hypotension   # KAY on CKD with possibility for Hepatorenal syndrome   # Alcohol inappropriate use   # Liver cirrhosis   # SBP  # Sepsis   # GBS     _________CNS___________  Pt a&ox3 at baseline  currently intubated, sedated  Will hold off on speech and swallow eval while pt is intubated    _________CVS___________  #Sepsis   secondary to SBP (confirmed on 9/15 with Diagnostic Para > 9000 Neutrophils)  s/p 2L NS in ED   Tachycardic     _________ RESP__________  #AHRF   CT A/P/C: basilar atelectasis,   started on Zosyn on admission  Increased work of breathing, tachy  sating 92% on 10L o2  increased secretions coughing  Intubated on vent  c/w daily ABG, wean 02    __________GI____________  #Alcoholic Cirrhosis  multiple hospitalization for encephalopathy    on spironolactone and lactulose  hypotensive likely due to splanchnic vasodilation, consider albumin infusion    MELD 18   INR: 1.8   CTA/P showing ascites   will hold spironolactone for now  c/w lactulose to have 2-3 bowel movements. May titrate down dose   ammonia level- 18  s/p diagnostic paracentesis 9/15- confirmed SBP    #SBP  confirmed- s/p diagnostic para in ED on 9/15 with > 9000 neutrophils   f/u peritoneal fluid culture       ________ RENAL__________  #KAY  1.7   baseline normal from 07/2023  avoid nephrotoxic meds  trend Cr daily     _________MSK___________  Generalized weakness  No focal deficit     __________ID____________  started on Zosyn  f/u ID recs     _________ENDO__________  #Hypoglycemic  s/p 2 amps D50  monitor BG    _______HEME/ONC_______  #Anemia  monitor HBG dropped by 2    _________SKIN____________  Multiple area of echymmosis on UE  fragile skin    ________Prophylaxis_______  #DVT scd  #GI     ________GOC/ DISPO_______  ICU 83 year old  Man  from home, ambulates with walker with  hypothyroidism, alcoholic cirrhosis, duodenal ulcers, AVM's, CKD, GBS, 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 As per wifehe has no  cough, runny nose. Patient has been having black stools for the past 2 days. Pt noted to have increased secretions, tachycardia, tachypnea and AHRF with increased WOB. Admitted to ICU for AHRF requiring intubation for airway protection.   # Acute Hypoxic Resp failure   # AMS / Encephalopathy   # Hypotension   # KAY on CKD with possibility for Hepatorenal syndrome   # Alcohol inappropriate use   # Liver cirrhosis   # SBP  # Sepsis   # GBS     _________CNS___________  Pt a&ox3 at baseline  currently intubated, sedated  Will hold off on speech and swallow eval while pt is intubated    _________CVS___________  #Sepsis   secondary to SBP (confirmed on 9/15 with Diagnostic Para > 9000 Neutrophils)  s/p 2L NS in ED   Tachycardic     _________ RESP__________  #AHRF   CT A/P/C: basilar atelectasis,   started on Zosyn on admission  Increased work of breathing, tachy  sating 92% on 10L o2  increased secretions coughing  Intubated on vent  c/w daily ABG, wean 02    __________GI____________  #Alcoholic Cirrhosis  multiple hospitalization for encephalopathy    on spironolactone and lactulose  hypotensive likely due to splanchnic vasodilation, consider albumin infusion    MELD 18   INR: 1.8   CTA/P showing ascites   will hold spironolactone for now  c/w lactulose to have 2-3 bowel movements. May titrate down dose   ammonia level- 18  s/p diagnostic paracentesis 9/15- confirmed SBP    #SBP  confirmed- s/p diagnostic para in ED on 9/15 with > 9000 neutrophils   peritoneal fluid culture negative      ________ RENAL__________  #KAY  1.7   baseline normal from 07/2023  avoid nephrotoxic meds  trend Cr daily     _________MSK___________  Generalized weakness  No focal deficit     __________ID____________  started on Zosyn  f/u ID recs   Gram pos clusters on one set of blood cultures    _________ENDO__________  #Hypoglycemic  s/p 2 amps D50  monitor BG    _______HEME/ONC_______  #Anemia  monitor HBG dropped by 2    _________SKIN____________  Multiple area of echymmosis on UE  fragile skin    ________Prophylaxis_______  #DVT scd  #GI     ________GOC/ DISPO_______  ICU 83 year old  Man  from home, ambulates with walker with  hypothyroidism, alcoholic cirrhosis, duodenal ulcers, AVM's, CKD, GBS, 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 As per wifehe has no  cough, runny nose. Patient has been having black stools for the past 2 days. Pt noted to have increased secretions, tachycardia, tachypnea and AHRF with increased WOB. Admitted to ICU for AHRF requiring intubation for airway protection.   # Acute Hypoxic Resp failure   # AMS / Encephalopathy   # Hypotension   # KAY on CKD with possibility for Hepatorenal syndrome   # Alcohol inappropriate use   # Liver cirrhosis   # SBP  # Sepsis   # GBS     _________CNS___________  Pt a&ox3 at baseline  currently intubated, sedated  Will hold off on speech and swallow eval while pt is intubated  will dc fentanyl and propofol, and assess mental status  will need CTH tomorrow if mental status does not improve    _________CVS___________  #Sepsis   secondary to SBP (confirmed on 9/15 with Diagnostic Para > 9000 Neutrophils)  s/p 2L NS in ED   Tachycardic     _________ RESP__________  #AHRF   CT A/P/C: basilar atelectasis,   started on Zosyn on admission  Increased work of breathing, tachy  sating 92% on 10L o2  increased secretions coughing  Intubated on vent  c/w daily ABG, wean 02    __________GI____________  #Alcoholic Cirrhosis  multiple hospitalization for encephalopathy    on spironolactone and lactulose  hypotensive likely due to splanchnic vasodilation, consider albumin infusion    MELD 18   INR: 1.8   CTA/P showing ascites   will hold spironolactone for now  c/w lactulose to have 2-3 bowel movements. May titrate down dose   ammonia level- 18  s/p diagnostic paracentesis 9/15- confirmed SBP    #SBP  confirmed- s/p diagnostic para in ED on 9/15 with > 9000 neutrophils   peritoneal fluid culture negative      ________ RENAL__________  #KAY  1.7   baseline normal from 07/2023  avoid nephrotoxic meds  trend Cr daily     _________MSK___________  Generalized weakness  No focal deficit     __________ID____________  started on Zosyn  f/u ID recs   Gram pos clusters on one set of blood cultures    _________ENDO__________  #Hypoglycemic  s/p 2 amps D50  monitor BG    _______HEME/ONC_______  #Anemia  monitor HBG dropped by 2    _________SKIN____________  Multiple area of echymmosis on UE  fragile skin    ________Prophylaxis_______  #DVT scd  #GI     ________GOC/ DISPO_______  ICU

## 2023-09-17 NOTE — PATIENT PROFILE ADULT - FALL HARM RISK - HARM RISK INTERVENTIONS

## 2023-09-18 NOTE — PROGRESS NOTE ADULT - ASSESSMENT
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 As per wifehe has no  cough, runny nose. Patient has been having black stools for the past 2 days. Pt noted to have increased secretions, tachycardia, tachypnea and AHRF with increased WOB. Admitted to ICU for AHRF requiring intubation for airway protection.   # Acute Hypoxic Resp failure   # AMS / Encephalopathy   # Hypotension   # KAY on CKD with possibility for Hepatorenal syndrome   # Alcohol inappropriate use   # Liver cirrhosis   # SBP  # Sepsis       _________CNS___________  Pt a&ox3 at baseline  currently intubated, sedated  Will hold off on speech and swallow eval while pt is intubated    _________CVS___________  #Sepsis   secondary to SBP (confirmed on 9/15 with Diagnostic Para > 9000 Neutrophils)  s/p 2L NS in ED   Tachycardic     _________ RESP__________  #AHRF   CT A/P/C: basilar atelectasis,   started on Zosyn on admission  Increased work of breathing, tachy  sating 92% on 10L o2  increased secretions coughing  Intubated on vent  [id number]/[id number]  Vent: 450/16/50/5, FiO2 weaned down to 35%    __________GI____________  #Alcoholic Cirrhosis  multiple hospitalization for encephalopathy    on spironolactone and lactulose  hypotensive likely due to splanchnic vasodilation, consider albumin infusion    MELD 18   INR: 1.8   CTA/P showing ascites   will hold spironolactone for now  c/w lactulose to have 2-3 bowel movements. May titrate down dose   ammonia level- 18  s/p diagnostic paracentesis 9/15- confirmed SBP    #SBP  confirmed- s/p diagnostic para in ED on 9/15 with > 9000 neutrophils   peritoneal fluid culture negative  albumin x 1 ()      ________ RENAL__________  #KAY  1.7   baseline normal from 2023  avoid nephrotoxic meds  Cr 1.57 > 1.82  f/u urine lytes     _________MSK___________  Generalized weakness  No focal deficit     __________ID____________  started on Zosyn  f/u ID recs   Gram pos clusters on one set of blood cultures    _________ENDO__________  #Hypoglycemic  s/p 2 amps D50  monitor BG    _______HEME/ONC_______  #Anemia  monitor HBG dropped by 2  Hb: 10 > 11.2    _________SKIN____________  Multiple area of echymmosis on UE  fragile skin    ________Prophylaxis_______  #DVT scd  #GI     ________GOC/ DISPO_______  ICU

## 2023-09-18 NOTE — CONSULT NOTE ADULT - ASSESSMENT
Septic shock  Spontaneous Bacterial Peritonitis  Leukocytosis - mild  Bacteremia - Contaminant      Plan - Cont Zosyn 3.375gms iv q8hrs  repeat blood cultures when feasible.

## 2023-09-18 NOTE — DIETITIAN INITIAL EVALUATION ADULT - PROBLEM SELECTOR PLAN 8
- Bilirubin: 2  - CT A/P: Biliary ductal dilation unchanged from prior  - f/u direct and total bilirubin levels   - Hepatology consulted Dr Caballero

## 2023-09-18 NOTE — DIETITIAN INITIAL EVALUATION ADULT - PERTINENT MEDS FT
MEDICATIONS  (STANDING):  albumin human  5% IVPB 250 milliLiter(s) IV Intermittent once  chlorhexidine 0.12% Liquid 15 milliLiter(s) Oral Mucosa every 12 hours  chlorhexidine 2% Cloths 1 Application(s) Topical daily  chlorhexidine 2% Cloths 1 Application(s) Topical <User Schedule>  lactulose Retention Enema 200 Gram(s) Rectal daily  norepinephrine Infusion 0.05 MICROgram(s)/kG/Min (3.03 mL/Hr) IV Continuous <Continuous>  pantoprazole  Injectable 40 milliGRAM(s) IV Push daily  piperacillin/tazobactam IVPB.. 3.375 Gram(s) IV Intermittent every 8 hours    MEDICATIONS  (PRN):  acetaminophen     Tablet .. 650 milliGRAM(s) Oral every 6 hours PRN Temp greater or equal to 38C (100.4F), Mild Pain (1 - 3)  sodium chloride 0.9% lock flush 10 milliLiter(s) IV Push every 1 hour PRN Pre/post blood products, medications, blood draw, and to maintain line patency

## 2023-09-18 NOTE — DIETITIAN INITIAL EVALUATION ADULT - OTHER INFO
Pt seen. Discussed on ICU IDR. Plan was to extubate or if not extubated to start TF. Pt noted w/AHRF, AMS, KAY on CKD w/ possible hepatorenal syndrome, liver cirrhosis, decreased UO, noted. Pt was for SLP (but intubated). Ascites c/b SBP (hepatology note). 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

## 2023-09-18 NOTE — CONSULT NOTE ADULT - SUBJECTIVE AND OBJECTIVE BOX
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  Hepatology consulted for ACLF.       PMHX/PSHX:    HTN (hypertension)  Guillain-[place] syndrome  Liver cirrhosis  Porcelain gallbladder  Pancreatitis  Arteriovenous malformation (AVM)  Hematochezia  Melena  Chronic kidney disease (CKD)  GERD (gastroesophageal reflux disease)  Hypothyroidism  H/O inguinal hernia repair  History of repair of hip fracture  History of hip replacement  H/O spinal stenosis      Allergies:  No Known Allergies      Home Medications: reviewed  Hospital Medications:  acetaminophen     Tablet .. 650 milliGRAM(s) Oral every 6 hours PRN  albumin human  5% IVPB 250 milliLiter(s) IV Intermittent once  chlorhexidine 0.12% Liquid 15 milliLiter(s) Oral Mucosa every 12 hours  chlorhexidine 2% Cloths 1 Application(s) Topical daily  chlorhexidine 2% Cloths 1 Application(s) Topical <User Schedule>  lactulose Retention Enema 200 Gram(s) Rectal daily  norepinephrine Infusion 0.05 MICROgram(s)/kG/Min IV Continuous <Continuous>  pantoprazole  Injectable 40 milliGRAM(s) IV Push daily  piperacillin/tazobactam IVPB.. 3.375 Gram(s) IV Intermittent every 8 hours  sodium chloride 0.9% lock flush 10 milliLiter(s) IV Push every 1 hour PRN      Social History:   Unable to obtain due to patient condition.     Family history:  Unable to obtain due to patient condition.     ROS:   Unable to obtain due to patient condition.       PHYSICAL EXAM:   Vital Signs:  Vital Signs Last 24 Hrs  T(C): 36.6 (18 Sep 2023 11:15), Max: 36.9 (17 Sep 2023 19:00)  T(F): 97.9 (18 Sep 2023 11:15), Max: 98.4 (17 Sep 2023 19:00)  HR: 66 (18 Sep 2023 11:52) (55 - 76)  BP: 95/60 (18 Sep 2023 11:15) (61/48 - 160/67)  BP(mean): 72 (18 Sep 2023 11:15) (54 - 93)  RR: 17 (18 Sep 2023 11:15) (16 - 19)  SpO2: 100% (18 Sep 2023 11:52) (99% - 100%)    Parameters below as of 18 Sep 2023 06:00  Patient On (Oxygen Delivery Method): ventilator    O2 Concentration (%): 50  Daily     Daily     GENERAL:Ill appearing  NEURO: sedated, unable to assess  HEENT: anicteric sclera, no conjunctival pallor appreciated  CHEST: intubated, on vent  CARDIAC: regular rate, rhythm  ABDOMEN: soft, non-distended, some tenderness (sedated but grimacing on palpation), no rebound or guarding  EXTREMITIES: warm, well perfused, no edema  SKIN: no rash    LABS: reviewed                        11.2   11.54 )-----------( 137      ( 18 Sep 2023 03:25 )             33.3     09-18    137  |  108  |  26<H>  ----------------------------<  113<H>  3.8   |  16<L>  |  1.82<H>    Ca    8.5      18 Sep 2023 03:25  Phos  3.0     09-18  Mg     1.9     09-18    TPro  5.5<L>  /  Alb  2.4<L>  /  TBili  1.5<H>  /  DBili  x   /  AST  12  /  ALT  8<L>  /  AlkPhos  131<H>  09-18    LIVER FUNCTIONS - ( 18 Sep 2023 03:25 )  Alb: 2.4 g/dL / Pro: 5.5 g/dL / ALK PHOS: 131 U/L / ALT: 8 U/L DA / AST: 12 U/L / GGT: x             Culture - Body Fluid with Gram Stain (collected 15 Sep 2023 19:50)  Source: Abdominal Fl Abdominal Fluid  Gram Stain (16 Sep 2023 03:16):    polymorphonuclear leukocytes seen    No organisms seen    by cytocentrifuge  Preliminary Report (16 Sep 2023 21:50):    No growth    Culture - Blood (collected 15 Sep 2023 14:10)  Source: .Blood Blood-Peripheral  Gram Stain (16 Sep 2023 17:17):    Growth in aerobic bottle: Gram Positive Cocci in Clusters  Final Report (17 Sep 2023 14:22):    Growth in aerobic bottle: Staphylococcus capitis    Coagulase Negative Staphylococci isolated from a single blood culture set    may represent contamination.    Contact the Microbiology Department at [phone] if susceptibility    testing is clinically indicated.    Direct identification is available within approximately 3-5    hours either by Blood Panel Multiplexed PCR or Direct    MALDI-TOF. Details: [email or web address]  Organism: Blood Culture PCR (17 Sep 2023 14:22)  Organism: Blood Culture PCR (17 Sep 2023 14:22)    Culture - Blood (collected 15 Sep 2023 14:00)  Source: .Blood Blood-Peripheral  Preliminary Report (17 Sep 2023 22:01):    No growth at 48 Hours        Diagnostic Studies: see sunrise for full report

## 2023-09-18 NOTE — PROGRESS NOTE ADULT - ATTENDING COMMENTS
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 As per wifehe has no  cough, runny nose. Patient has been having black stools for the past 2 days  with dark-colored urine. He also endorse chest pain, midsternal  Denies any hematemesis, fevers, shortness of breath, N/V/D.      9/16- ICU consulted for increased secretions, tachycardia, tachypnea, at risk for airway compromise with acute hypoxic resp failure using accessory muscle , will intubate and wife agreed       ASSESSMENT   - Acute Hypoxic Resp failure   - Acute encephalopathy   - Hypotension   - KAY on CKD with possibility for Hepatorenal syndrome   - Liver cirrhosis   - SBP  - Sepsis     Plan   - Awakening trial   - Monitor neurologic status  - If no improvement in mentation, obtain CT head   - Continue vent support , adjust as per ABG   - Hemodynamic support   - Titrate vaso-pressors to maintain MAP>65 as needed   - IV antibx to cover GNR in SBP  - F/U cultures and peritoneal fluid   - Albumin   - Trend LFT and NH4  - Lactulose rectally   - EGD in June with out esophageal varices   - No obvious bleeding at this time  - Will place small gauge NGT and start tube feedings  - DVT PI prophy  - Cont. ICU care

## 2023-09-18 NOTE — DIETITIAN INITIAL EVALUATION ADULT - MALNUTRITION
PCM (at least moderate) related to acute on chronic illness (ETOH cirrhosis) as evidenced by <50% needs met> 5 days, weight w/ ascites, temporal wasting observed

## 2023-09-18 NOTE — DIETITIAN INITIAL EVALUATION ADULT - PROBLEM SELECTOR PLAN 4
- CT A/P/C: basilar atelectasis, cirrhosis, exophytic ,mass post to liver and extending to diaphragm (hepatoma)   stale pancreatic head mass 2.8cm   - Hepatology consulted Dr Caballero

## 2023-09-18 NOTE — DIETITIAN INITIAL EVALUATION ADULT - ADD RECOMMEND
PCM (at least moderate). Vent/ NPO. If TF (w/o Propofol) :consider (initial): Jevity 1.5 40x24 (960 ml, 1440 kcals, 61 gm protein) Add 1 Pkt Prosource/ day (+15 gm protein, 60 kcals). MD to monitor. RD available.

## 2023-09-18 NOTE — DIETITIAN INITIAL EVALUATION ADULT - PROBLEM SELECTOR PLAN 9
- NA: 131 likely secondary to cirrhosis causing hypovolemia and diuretic use   -f/u repeat BMP   - consider albumin infusion

## 2023-09-18 NOTE — DIETITIAN INITIAL EVALUATION ADULT - NSICDXPASTMEDICALHX_GEN_ALL_CORE_FT
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

## 2023-09-18 NOTE — CONSULT NOTE ADULT - GASTROINTESTINAL
soft/normal active bowel sounds/no guarding/no rigidity/no organomegaly/no masses palpable/tender/distended/ascites

## 2023-09-18 NOTE — DIETITIAN INITIAL EVALUATION ADULT - PROBLEM SELECTOR PLAN 1
p/w generalized abdominal pain, ascites on CT scan   - Maybe secondary to SBP   Tachycardic, hypotensive, lactate 3.7> 3.1>   -CT A/P/C: basilar atelectasis, cirrhosis, exophytic ,mass post to liver and extending to diaphragm (hepatoma)   stabe pancreatic head mass 2.8cm Biliary ductal dilation unchanged from prior    trend lactate  received 2L  bolus  f/u blood cultures, urine cultures  ua pending   s/p Diagnostic para   f/u fluid analysis   f/u ammonia  c/w lactulose to have 2-3 bowel movements daily  cover with Zosyn   Hepatology consult

## 2023-09-18 NOTE — CONSULT NOTE ADULT - SUBJECTIVE AND OBJECTIVE BOX
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 As per wifehe has no  cough, runny nose. Patient has been having black stools for the past 2 days  with dark-colored urine. He also endorse chest pain, midsternal  Denies any hematemesis, fevers, shortness of breath, N/V/D.      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 650 milliGRAM(s) Oral every 6 hours PRN  chlorhexidine 0.12% Liquid 15 milliLiter(s) Oral Mucosa every 12 hours  chlorhexidine 2% Cloths 1 Application(s) Topical <User Schedule>  chlorhexidine 2% Cloths 1 Application(s) Topical daily  lactulose Retention Enema 200 Gram(s) Rectal daily  norepinephrine Infusion 0.05 MICROgram(s)/kG/Min IV Continuous <Continuous>  pantoprazole  Injectable 40 milliGRAM(s) IV Push daily  piperacillin/tazobactam IVPB.. 3.375 Gram(s) IV Intermittent every 8 hours  sodium chloride 0.9% lock flush 10 milliLiter(s) IV Push every 1 hour PRN      SOCIAL HISTORY:  Smoker:  YES / NO        PACK YEARS:                         WHEN QUIT?  ETOH use:  YES / NO               FREQUENCY / QUANTITY:  Ilic[employer] Drug use:  YES / NO  Occupation:  Assisted device use (Cane / Walker):  Live with:    FAMILY HISTORY:  Family history of ovarian cancer (Sibling)        VITALS:  Vital Signs Last 24 Hrs  T(C): 36.1 (18 Sep 2023 17:15), Max: 36.9 (17 Sep 2023 19:00)  T(F): 97 (18 Sep 2023 17:15), Max: 98.4 (17 Sep 2023 19:00)  HR: 68 (18 Sep 2023 17:15) (55 - 99)  BP: 99/63 (18 Sep 2023 17:15) (61/48 - 160/67)  BP(mean): 76 (18 Sep 2023 17:15) (54 - 93)  RR: 18 (18 Sep 2023 17:15) (16 - 21)  SpO2: 100% (18 Sep 2023 17:15) (99% - 100%)    Parameters below as of 18 Sep 2023 06:00  Patient On (Oxygen Delivery Method): ventilator    O2 Concentration (%): 50    LABS/DIAGNOSTIC TESTS:  Cell Count, Body Fluid (09.15.23 @ 22:10)   Tube Type: Lavender  Fluid Type: Peritoneal fl  Body Fluid Appearance: Turbid  Color - Body Fluid: Yellow  Total Nucleated Cell Count, Body Fluid: 9063: Reference Ranges:   Synovial Fluid   Total nucleated cells < 150 cells/uL   Neutrophils <25%   Lymphocytes 10-15%   Monocytes/Macrophages   Other parameters- No established reference ranges.   Bronchoalveolar lavage   Macrophages >85%   Lymphocytes 10-15%   Neutrophils <3%   Eosinophils <1%   Other parameters- No established reference ranges.   Peritoneal/pleural/pericardial fluid/other body fluid types   No established reference ranges. /uL  Total RBC Count: 1550 /uL  Neutrophils-Body Fluid: 95 %  BF Lymphocytes: 2 %  Monocyte/Macrophage Count - Body Fluid: 3 %  Comment - Fluids: Differential based on 100 cells counted after cytocentrifugation                            11.2   11.54 )-----------( 137      ( 18 Sep 2023 03:25 )             33.3     WBC Count: 11.54 K/uL (09-18 @ 03:25)  WBC Count: 10.73 K/uL (09-17 @ 03:55)  WBC Count: 10.90 K/uL (09-16 @ 20:27)  WBC Count: 10.53 K/uL (09-16 @ 15:38)  WBC Count: 6.53 K/uL (09-16 @ 04:55)      09-18    137  |  108  |  26<H>  ----------------------------<  113<H>  3.8   |  16<L>  |  1.82<H>    Ca    8.5      18 Sep 2023 03:25  Phos  3.0     09-18  Mg     1.9     09-18    TPro  5.5<L>  /  Alb  2.4<L>  /  TBili  1.5<H>  /  DBili  x   /  AST  12  /  ALT  8<L>  /  AlkPhos  131<H>  09-18      uUrine Microscopic-Add On (NC) (09.18.23 @ 10:50)   Squamous Epithelial Cells: Few  Red Blood Cell - Urine: >50 /HPF  White Blood Cell - Urine: 10 /HPFUrinalysis (09.18.23 @ 10:50)   pH Urine: 5.0  Glucose Qualitative, Urine: Negative mg/dL  Blood, Urine: Large  Color: Yellow  Urine Appearance: Clear  Bilirubin: Negative  Ketone - Urine: Negative mg/dL  Specific Gravity: 1.018  Protein, Urine: Trace mg/dL  Urobilinogen: 1.0 mg/dL  Nitrite: Negative  Leukocyte Esterase Concentration: Moderate    LIVER FUNCTIONS - ( 18 Sep 2023 03:25 )  Alb: 2.4 g/dL / Pro: 5.5 g/dL / ALK PHOS: 131 U/L / ALT: 8 U/L DA / AST: 12 U/L / GGT: x             PT/INR - ( 18 Sep 2023 05:00 )   PT: 28.5 sec;   INR: 2.57 ratio         PTT - ( 18 Sep 2023 05:00 )  PTT:51.2 sec    LACTATE:    ABG - ABG - ( 18 Sep 2023 03:23 )  pH, Arterial: 7.42  pH, Blood: x     /  pCO2: 23    /  pO2: 177   / HCO3: 15    / Base Excess: -7.6  /  SaO2: 100                 CULTURES:   Abdominal Fl Abdominal Fluid  09-15 @ 19:50   No growth  --    polymorphonuclear leukocytes seen  No organisms seen  by cytocentrifuge      .Blood Blood-Peripheral  09-15 @ 14:10   Growth in aerobic bottle: Staphylococcus capitis  Coagulase Negative Staphylococci isolated from a single blood culture set  may represent contamination.  Contact the Microbiology Department at [phone] if susceptibility  testing is clinically indicated.  Direct identification is available within approximately 3-5  hours either by Blood Panel Multiplexed PCR or Direct  MALDI-TOF. Details: [email or web address]  --  Blood Culture PCR      .Blood Blood-Peripheral  09-15 @ 14:00   No growth at 48 Hours  --  --          RADIOLOGY:< from: Xray Chest 1 View- PORTABLE-Urgent (Xray Chest 1 View- PORTABLE-Urgent .) (09.16.23 @ 18:21) >  ACC: [id number] EXAM:  XR CHEST PORTABLE URGENT 1V   ORDERED BY: CHOLO RIZO     PROCEDURE DATE:  09/16/2023          INTERPRETATION:  DATE OF STUDY: 9/16/23 at 5:50PM    PRIOR: Earlier 9/16/23 exam    CLINICAL INDICATION: Assess ET tube    TECHNIQUE: AP radiograph of the chest.    FINDINGS:  ET tube tip above matt.  RIJ central venous catheter tip at SVC/RA junction.  Difficult to assess heart size on this projection.  Focal consolidations. No large pleural effusion. No pneumothorax.  No acute bony finding.  Cervical spine fusion hardware in place.    IMPRESSION:  No radiographic evidence of active chest disease.    --- End of Report ---            ZEUS REVELES MD; Attending Radiologist  This document has been electronically signed.Sep 18 2023 10:28AM    < end of copied text >  --------------------------------------------------------------------------------------------------------------------------------------------------------------  ACC: [id number] EXAM:  CT CHEST IC   ORDERED BY: SHANIKA DICKSON     ACC: [id number] EXAM:  CT ABDOMEN AND PELVIS IC   ORDERED BY: SHANIKA DICKSON     PROCEDURE DATE:  09/15/2023          INTERPRETATION:  CLINICAL INFORMATION: 82-year-old man with history of   cirrhosis. Left-sided chest pain and diffuse abdominal pain    COMPARISON: CT abdomen 07/15/2023 and 02/07/2023    CONTRAST/COMPLICATIONS:  IV Contrast: IV contrast documented in unlinked concurrent exam   (accession [id number]), Omnipaque 350 (accession [id number])  90 cc   administered   10 cc discarded  Oral Contrast: NONE  Complications: None reported at time of study completion    PROCEDURE:  CT of the Chest, Abdomen and Pelvis was performed.  Sagittal and coronal reformats were performed.    FINDINGS:  CHEST:  LUNGS AND LARGE AIRWAYS: Patent central airways. Bibasilar subsegmental   atelectasis.  PLEURA: No pleural effusion.  VESSELS: Within normal limits.  HEART: Heart size is normal. No pericardial effusion. Coronary artery   calcification  MEDIASTINUM AND NATE: No lymphadenopathy.  CHEST WALL AND LOWER NECK: Within normal limits.    ABDOMEN AND PELVIS:  LIVER: Advanced cirrhosis. Low density mass exophytic from the posterior   liver dome, 3 x 2.2 x 3.3 cm contiguous to the diaphragm (2:82 and 8:95)   new since 02/07/2023. Area obscured on exam of 07/15/2023 due to apparent   motion  BILE DUCTS: Mild intrahepatic biliary ductal dilatation unchanged from   07/15/2023. Apparent calcified intraductal mass just superior to the   pancreatic head measuring 2.8 cm as previously demonstrated  GALLBLADDER: Gallstones.  SPLEEN: Within normal limits.  PANCREAS: Subcentimeter pancreatic cysts  ADRENALS: Within normal limits.  KIDNEYS/URETERS: Within normal limits.    BLADDER: Within normal limits.  REPRODUCTIVE ORGANS: Limited visualization due to artifact from hip   replacement    BOWEL: No bowel obstruction. Appendix not visualized. No evidence of   appendicitis. Mural edema ascending colon consistent with portal   colopathy. Dilated small bowel loop left upper quadrant suggesting   localized ileus.  PERITONEUM: Gross ascites. Enhancing posterior pelvic peritoneum  VESSELS: Patent portal veins and IVC. Apparent patent hepatic veins but   limited visualization. Calcification and plaque origin SMA.   Atherosclerotic change with extensive aortic plaque. Left common iliac   aneurysm 1.5 cm  RETROPERITONEUM/LYMPH NODES: No lymphadenopathy.  ABDOMINAL WALL: Umbilical hernia containing fat and fluid.  BONES: Right hip replacement. Degenerative change.    IMPRESSION:  Cirrhosis.  Exophytic mass posterior liver dome with apparent extension to the   adjacent diaphragm. Consider hepatoma. May obtain further evaluation with   MRI as clinically indicated  Gross ascites.  Patent portal and hepatic veins        --- End of Report ---            YOSI CEDEÑO MD; Attending Radiologist  This document has been electronically signed. 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 Patient has been having black stools for the past 2 days  with dark-colored urine. He also endorse chest pain, midsternal  Denies any hematemesis, fevers, shortness of breath, N/V/D.      In ED:   Vitals: BP: 94/67mmHg, HR: 130, T: 36.7 on RA  s/p 2LNS   D50 x2   f/u Bcux and Ucx   CT A/P/C: basilar atelectasis, cirrhosis, exophytic ,mass post to liver and extending to diaphragm (hepatoma)   stabe pancreatic head mass 2.8cm   Biliary ductal dilation unchanged from prior   (15 Sep 2023 20:06)          History as above, asked to see this patient who was admitted to the ICU with GI bleeding and abdominal pain, he has no fevers but had to be intubated here and is currently in the ICU intubated sedated and vent dependent on an FIO2 of 35% and PEEP of 5, he is on 1 pressor also. He has Cirrhosis of liver with ascites and so had a peritoneal tap and shows him to have a nucleated cell count of 9,063 which shows him to have Spontaneous Bacterial Peritonitis. He is currently on Zosyn for his SBP/ septic shock.            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 650 milliGRAM(s) Oral every 6 hours PRN  chlorhexidine 0.12% Liquid 15 milliLiter(s) Oral Mucosa every 12 hours  chlorhexidine 2% Cloths 1 Application(s) Topical <User Schedule>  chlorhexidine 2% Cloths 1 Application(s) Topical daily  lactulose Retention Enema 200 Gram(s) Rectal daily  norepinephrine Infusion 0.05 MICROgram(s)/kG/Min IV Continuous <Continuous>  pantoprazole  Injectable 40 milliGRAM(s) IV Push daily  piperacillin/tazobactam IVPB.. 3.375 Gram(s) IV Intermittent every 8 hours  sodium chloride 0.9% lock flush 10 milliLiter(s) IV Push every 1 hour PRN      SOCIAL HISTORY:  Smoker:  unknown  ETOH use:  YES in past he abused alcohol    FAMILY HISTORY:  Family history of ovarian cancer (Sibling)        VITALS:  Vital Signs Last 24 Hrs  T(C): 36.1 (18 Sep 2023 17:15), Max: 36.9 (17 Sep 2023 19:00)  T(F): 97 (18 Sep 2023 17:15), Max: 98.4 (17 Sep 2023 19:00)  HR: 68 (18 Sep 2023 17:15) (55 - 99)  BP: 99/63 (18 Sep 2023 17:15) (61/48 - 160/67)  BP(mean): 76 (18 Sep 2023 17:15) (54 - 93)  RR: 18 (18 Sep 2023 17:15) (16 - 21)  SpO2: 100% (18 Sep 2023 17:15) (99% - 100%)    Parameters below as of 18 Sep 2023 06:00  Patient On (Oxygen Delivery Method): ventilator    O2 Concentration (%): 50    LABS/DIAGNOSTIC TESTS:  Cell Count, Body Fluid (09.15.23 @ 22:10)   Tube Type: Lavender  Fluid Type: Peritoneal fl  Body Fluid Appearance: Turbid  Color - Body Fluid: Yellow  Total Nucleated Cell Count, Body Fluid: 9063: Reference Ranges:   Synovial Fluid   Total nucleated cells < 150 cells/uL   Neutrophils <25%   Lymphocytes 10-15%   Monocytes/Macrophages   Other parameters- No established reference ranges.   Bronchoalveolar lavage   Macrophages >85%   Lymphocytes 10-15%   Neutrophils <3%   Eosinophils <1%   Other parameters- No established reference ranges.   Peritoneal/pleural/pericardial fluid/other body fluid types   No established reference ranges. /uL  Total RBC Count: 1550 /uL  Neutrophils-Body Fluid: 95 %  BF Lymphocytes: 2 %  Monocyte/Macrophage Count - Body Fluid: 3 %  Comment - Fluids: Differential based on 100 cells counted after cytocentrifugation                            11.2   11.54 )-----------( 137      ( 18 Sep 2023 03:25 )             33.3     WBC Count: 11.54 K/uL (09-18 @ 03:25)  WBC Count: 10.73 K/uL (09-17 @ 03:55)  WBC Count: 10.90 K/uL (09-16 @ 20:27)  WBC Count: 10.53 K/uL (09-16 @ 15:38)  WBC Count: 6.53 K/uL (09-16 @ 04:55)      09-18    137  |  108  |  26<H>  ----------------------------<  113<H>  3.8   |  16<L>  |  1.82<H>    Ca    8.5      18 Sep 2023 03:25  Phos  3.0     09-18  Mg     1.9     09-18    TPro  5.5<L>  /  Alb  2.4<L>  /  TBili  1.5<H>  /  DBili  x   /  AST  12  /  ALT  8<L>  /  AlkPhos  131<H>  09-18      uUrine Microscopic-Add On (NC) (09.18.23 @ 10:50)   Squamous Epithelial Cells: Few  Red Blood Cell - Urine: >50 /HPF  White Blood Cell - Urine: 10 /HPFUrinalysis (09.18.23 @ 10:50)   pH Urine: 5.0  Glucose Qualitative, Urine: Negative mg/dL  Blood, Urine: Large  Color: Yellow  Urine Appearance: Clear  Bilirubin: Negative  Ketone - Urine: Negative mg/dL  Specific Gravity: 1.018  Protein, Urine: Trace mg/dL  Urobilinogen: 1.0 mg/dL  Nitrite: Negative  Leukocyte Esterase Concentration: Moderate    LIVER FUNCTIONS - ( 18 Sep 2023 03:25 )  Alb: 2.4 g/dL / Pro: 5.5 g/dL / ALK PHOS: 131 U/L / ALT: 8 U/L DA / AST: 12 U/L / GGT: x             PT/INR - ( 18 Sep 2023 05:00 )   PT: 28.5 sec;   INR: 2.57 ratio         PTT - ( 18 Sep 2023 05:00 )  PTT:51.2 sec    LACTATE:    ABG - ABG - ( 18 Sep 2023 03:23 )  pH, Arterial: 7.42  pH, Blood: x     /  pCO2: 23    /  pO2: 177   / HCO3: 15    / Base Excess: -7.6  /  SaO2: 100                 CULTURES:   Abdominal Fl Abdominal Fluid  09-15 @ 19:50   No growth  --    polymorphonuclear leukocytes seen  No organisms seen  by cytocentrifuge      .Blood Blood-Peripheral  09-15 @ 14:10   Growth in aerobic bottle: Staphylococcus capitis  Coagulase Negative Staphylococci isolated from a single blood culture set  may represent contamination.  Contact the Microbiology Department at [phone] if susceptibility  testing is clinically indicated.  Direct identification is available within approximately 3-5  hours either by Blood Panel Multiplexed PCR or Direct  MALDI-TOF. Details: [email or web address]  --  Blood Culture PCR      .Blood Blood-Peripheral  09-15 @ 14:00   No growth at 48 Hours  --  --          RADIOLOGY:< from: Xray Chest 1 View- PORTABLE-Urgent (Xray Chest 1 View- PORTABLE-Urgent .) (09.16.23 @ 18:21) >  ACC: [id number] EXAM:  XR CHEST PORTABLE URGENT 1V   ORDERED BY: CHOLO RIZO     PROCEDURE DATE:  09/16/2023          INTERPRETATION:  DATE OF STUDY: 9/16/23 at 5:50PM    PRIOR: Earlier 9/16/23 exam    CLINICAL INDICATION: Assess ET tube    TECHNIQUE: AP radiograph of the chest.    FINDINGS:  ET tube tip above matt.  RIJ central venous catheter tip at SVC/RA junction.  Difficult to assess heart size on this projection.  Focal consolidations. No large pleural effusion. No pneumothorax.  No acute bony finding.  Cervical spine fusion hardware in place.    IMPRESSION:  No radiographic evidence of active chest disease.    --- End of Report ---            ZEUS REVELES MD; Attending Radiologist  This document has been electronically signed.Sep 18 2023 10:28AM    < end of copied text >  --------------------------------------------------------------------------------------------------------------------------------------------------------------  ACC: [id number] EXAM:  CT CHEST IC   ORDERED BY: SHANIKA DICKSON     ACC: [id number] EXAM:  CT ABDOMEN AND PELVIS IC   ORDERED BY: SHANIKA DICKSON     PROCEDURE DATE:  09/15/2023          INTERPRETATION:  CLINICAL INFORMATION: 82-year-old man with history of   cirrhosis. Left-sided chest pain and diffuse abdominal pain    COMPARISON: CT abdomen 07/15/2023 and 02/07/2023    CONTRAST/COMPLICATIONS:  IV Contrast: IV contrast documented in unlinked concurrent exam   (accession [id number]), Omnipaque 350 (accession [id number])  90 cc   administered   10 cc discarded  Oral Contrast: NONE  Complications: None reported at time of study completion    PROCEDURE:  CT of the Chest, Abdomen and Pelvis was performed.  Sagittal and coronal reformats were performed.    FINDINGS:  CHEST:  LUNGS AND LARGE AIRWAYS: Patent central airways. Bibasilar subsegmental   atelectasis.  PLEURA: No pleural effusion.  VESSELS: Within normal limits.  HEART: Heart size is normal. No pericardial effusion. Coronary artery   calcification  MEDIASTINUM AND NATE: No lymphadenopathy.  CHEST WALL AND LOWER NECK: Within normal limits.    ABDOMEN AND PELVIS:  LIVER: Advanced cirrhosis. Low density mass exophytic from the posterior   liver dome, 3 x 2.2 x 3.3 cm contiguous to the diaphragm (2:82 and 8:95)   new since 02/07/2023. Area obscured on exam of 07/15/2023 due to apparent   motion  BILE DUCTS: Mild intrahepatic biliary ductal dilatation unchanged from   07/15/2023. Apparent calcified intraductal mass just superior to the   pancreatic head measuring 2.8 cm as previously demonstrated  GALLBLADDER: Gallstones.  SPLEEN: Within normal limits.  PANCREAS: Subcentimeter pancreatic cysts  ADRENALS: Within normal limits.  KIDNEYS/URETERS: Within normal limits.    BLADDER: Within normal limits.  REPRODUCTIVE ORGANS: Limited visualization due to artifact from hip   replacement    BOWEL: No bowel obstruction. Appendix not visualized. No evidence of   appendicitis. Mural edema ascending colon consistent with portal   colopathy. Dilated small bowel loop left upper quadrant suggesting   localized ileus.  PERITONEUM: Gross ascites. Enhancing posterior pelvic peritoneum  VESSELS: Patent portal veins and IVC. Apparent patent hepatic veins but   limited visualization. Calcification and plaque origin SMA.   Atherosclerotic change with extensive aortic plaque. Left common iliac   aneurysm 1.5 cm  RETROPERITONEUM/LYMPH NODES: No lymphadenopathy.  ABDOMINAL WALL: Umbilical hernia containing fat and fluid.  BONES: Right hip replacement. Degenerative change.    IMPRESSION:  Cirrhosis.  Exophytic mass posterior liver dome with apparent extension to the   adjacent diaphragm. Consider hepatoma. May obtain further evaluation with   MRI as clinically indicated  Gross ascites.  Patent portal and hepatic veins        --- End of Report ---            YOSI CEDEÑO MD; Attending Radiologist  This document has been electronically signed. Sep 15 2023  4:56PM    < end of copied text >        ROS  [ x ] UNABLE TO ELICIT

## 2023-09-18 NOTE — PROGRESS NOTE ADULT - SUBJECTIVE AND OBJECTIVE BOX
INTERVAL HPI/OVERNIGHT EVENTS: ***    PRESSORS: [ ] YES [ ] NO  WHICH:    Antimicrobial:  piperacillin/tazobactam IVPB.. 3.375 Gram(s) IV Intermittent every 8 hours    Cardiovascular:  norepinephrine Infusion 0.05 MICROgram(s)/kG/Min IV Continuous <Continuous>    Pulmonary:    Hematalogic:    Other:  acetaminophen     Tablet .. 650 milliGRAM(s) Oral every 6 hours PRN  chlorhexidine 0.12% Liquid 15 milliLiter(s) Oral Mucosa every 12 hours  chlorhexidine 2% Cloths 1 Application(s) Topical <User Schedule>  fentaNYL   Infusion 0.5 MICROgram(s)/kG/Hr IV Continuous <Continuous>  lactulose Retention Enema 200 Gram(s) Rectal daily  propofol Infusion 10.098 MICROgram(s)/kG/Min IV Continuous <Continuous>  sodium chloride 0.9% lock flush 10 milliLiter(s) IV Push every 1 hour PRN    acetaminophen     Tablet .. 650 milliGRAM(s) Oral every 6 hours PRN  chlorhexidine 0.12% Liquid 15 milliLiter(s) Oral Mucosa every 12 hours  chlorhexidine 2% Cloths 1 Application(s) Topical <User Schedule>  fentaNYL   Infusion 0.5 MICROgram(s)/kG/Hr IV Continuous <Continuous>  lactulose Retention Enema 200 Gram(s) Rectal daily  norepinephrine Infusion 0.05 MICROgram(s)/kG/Min IV Continuous <Continuous>  piperacillin/tazobactam IVPB.. 3.375 Gram(s) IV Intermittent every 8 hours  propofol Infusion 10.098 MICROgram(s)/kG/Min IV Continuous <Continuous>  sodium chloride 0.9% lock flush 10 milliLiter(s) IV Push every 1 hour PRN    Drug Dosing Weight  Height (cm): 170.2 (15 Sep 2023 11:37)  Weight (kg): 64.7 (16 Sep 2023 16:30)  BMI (kg/m2): 22.3 (16 Sep 2023 16:30)  BSA (m2): 1.75 (16 Sep 2023 16:30)    CENTRAL LINE: [ ] YES [ ] NO  LOCATION:   DATE INSERTED:  REMOVE: [ ] YES [ ] NO  EXPLAIN:    REYNOLDS: [ ] YES [ ] NO    DATE INSERTED:  REMOVE:  [ ] YES [ ] NO  EXPLAIN:    A-LINE:  [ ] YES [ ] NO  LOCATION:   DATE INSERTED:  REMOVE:  [ ] YES [ ] NO  EXPLAIN:    PMH -reviewed admission note, no change since admission  PAST MEDICAL & SURGICAL HISTORY:  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      Liver cirrhosis  alcoholic/WALL cirrhosis c/b variceal bleed s/p banding (8/2018) and hepatic encephalopathy (several years ago)      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)      Pancreatitis  2/2 choledocholithiasis s/p ERCP with stone extraction and plastic stent placement (11/2019, stent now removed)      Hematochezia  2/2 colonic AVMs s/p cauterization and hemorrhoids (11/2019)      Melena  2/2 duodenal ulcers s/p argon plasma coagulation (4/2020)      Chronic kidney disease (CKD)      GERD (gastroesophageal reflux disease)      Hypothyroidism      H/O inguinal hernia repair      History of repair of hip fracture  R hip      History of hip replacement  10/2020      H/O spinal stenosis          ICU Vital Signs Last 24 Hrs  T(C): 36 (18 Sep 2023 07:00), Max: 36.9 (17 Sep 2023 19:00)  T(F): 96.8 (18 Sep 2023 07:00), Max: 98.4 (17 Sep 2023 19:00)  HR: 58 (18 Sep 2023 08:15) (55 - 76)  BP: 102/58 (18 Sep 2023 07:00) (61/48 - 160/67)  BP(mean): 71 (18 Sep 2023 07:00) (54 - 93)  ABP: --  ABP(mean): --  RR: 16 (18 Sep 2023 07:00) (16 - 19)  SpO2: 100% (18 Sep 2023 08:15) (99% - 100%)    O2 Parameters below as of 18 Sep 2023 06:00  Patient On (Oxygen Delivery Method): ventilator    O2 Concentration (%): 50        ABG - ( 18 Sep 2023 03:23 )  pH, Arterial: 7.42  pH, Blood: x     /  pCO2: 23    /  pO2: 177   / HCO3: 15    / Base Excess: -7.6  /  SaO2: 100                   09-17 @ 07:01  -  09-18 @ 07:00  --------------------------------------------------------  IN: 998.8 mL / OUT: 365 mL / NET: 633.8 mL        Mode: AC/ CMV (Assist Control/ Continuous Mandatory Ventilation)  RR (machine): 16  TV (machine): 400  FiO2: 50  PEEP: 5  ITime: 1  MAP: 9  PIP: 23      PHYSICAL EXAM:    GENERAL: NAD, well-groomed, well-developed  HEAD:  Atraumatic, Normocephalic  EYES: EOMI, PERRLA, conjunctiva and sclera clear  ENMT: No tonsillar erythema, exudates, or enlargement; Moist mucous membranes, Good dentition, No lesions  NECK: Supple, normal appearance, No JVD; Normal thyroid; Trachea midline  NERVOUS SYSTEM:  Alert & Oriented X3,  Motor Strength 5/5 B/L upper and lower extremities; DTRs 2+ intact and symmetric  CHEST/LUNG: No chest deformity; Normal percussion bilaterally; No rales, rhonchi, wheezing   HEART: Regular rate and rhythm; No murmurs, rubs, or gallops  ABDOMEN: Soft, Nontender, Nondistended; Bowel sounds present  EXTREMITIES:  2+ Peripheral Pulses, No clubbing, cyanosis, or edema  LYMPH: No lymphadenopathy noted  SKIN: No rashes or lesions;  Good capillary refill      LABS:  CBC Full  -  ( 18 Sep 2023 03:25 )  WBC Count : 11.54 K/uL  RBC Count : 4.26 M/uL  Hemoglobin : 11.2 g/dL  Hematocrit : 33.3 %  Platelet Count - Automated : 137 K/uL  Mean Cell Volume : 78.2 fl  Mean Cell Hemoglobin : 26.3 pg  Mean Cell Hemoglobin Concentration : 33.6 gm/dL  Auto Neutrophil # : 10.03 K/uL  Auto Lymphocyte # : 0.67 K/uL  Auto Monocyte # : 0.65 K/uL  Auto Eosinophil # : 0.06 K/uL  Auto Basophil # : 0.03 K/uL  Auto Neutrophil % : 86.9 %  Auto Lymphocyte % : 5.8 %  Auto Monocyte % : 5.6 %  Auto Eosinophil % : 0.5 %  Auto Basophil % : 0.3 %    09-18    137  |  108  |  26<H>  ----------------------------<  113<H>  3.8   |  16<L>  |  1.82<H>    Ca    8.5      18 Sep 2023 03:25  Phos  3.0     09-18  Mg     1.9     09-18    TPro  5.5<L>  /  Alb  2.4<L>  /  TBili  1.5<H>  /  DBili  x   /  AST  12  /  ALT  8<L>  /  AlkPhos  131<H>  09-18    PT/INR - ( 18 Sep 2023 05:00 )   PT: 28.5 sec;   INR: 2.57 ratio         PTT - ( 18 Sep 2023 05:00 )  PTT:51.2 sec  Urinalysis Basic - ( 18 Sep 2023 03:25 )    Color: x / Appearance: x / SG: x / pH: x  Gluc: 113 mg/dL / Ketone: x  / Bili: x / Urobili: x   Blood: x / Protein: x / Nitrite: x   Leuk Esterase: x / RBC: x / WBC x   Sq Epi: x / Non Sq Epi: x / Bacteria: x      Culture Results:   No growth (09-15 @ 19:50)  Culture Results:   Growth in aerobic bottle: Staphylococcus capitis  Coagulase Negative Staphylococci isolated from a single blood culture set  may represent contamination.  Contact the Microbiology Department at [phone] if susceptibility  testing is clinically indicated.  Direct identification is available within approximately 3-5  hours either by Blood Panel Multiplexed PCR or Direct  MALDI-TOF. Details: [email or web address] (09-15 @ 14:10)  Culture Results:   No growth at 48 Hours (09-15 @ 14:00)      RADIOLOGY & ADDITIONAL STUDIES REVIEWED:  ***    [ ]GOALS OF CARE DISCUSSION WITH PATIENT/FAMILY/PROXY:    CRITICAL CARE TIME SPENT: 35 minutes INTERVAL HPI/OVERNIGHT EVENTS: No acute overnight events. Pt seen at bedside, intubated and sedated. Noted to have low UOP of 15cc/hr. Unable to assess ROS due to mental status    PRESSORS: [x] YES [ ] NO  WHICH: levo 0.19    Antimicrobial:  piperacillin/tazobactam IVPB.. 3.375 Gram(s) IV Intermittent every 8 hours    Cardiovascular:  norepinephrine Infusion 0.05 MICROgram(s)/kG/Min IV Continuous <Continuous>    Pulmonary:    Hematalogic:    Other:  acetaminophen     Tablet .. 650 milliGRAM(s) Oral every 6 hours PRN  chlorhexidine 0.12% Liquid 15 milliLiter(s) Oral Mucosa every 12 hours  chlorhexidine 2% Cloths 1 Application(s) Topical <User Schedule>  fentaNYL   Infusion 0.5 MICROgram(s)/kG/Hr IV Continuous <Continuous>  lactulose Retention Enema 200 Gram(s) Rectal daily  propofol Infusion 10.098 MICROgram(s)/kG/Min IV Continuous <Continuous>  sodium chloride 0.9% lock flush 10 milliLiter(s) IV Push every 1 hour PRN    acetaminophen     Tablet .. 650 milliGRAM(s) Oral every 6 hours PRN  chlorhexidine 0.12% Liquid 15 milliLiter(s) Oral Mucosa every 12 hours  chlorhexidine 2% Cloths 1 Application(s) Topical <User Schedule>  fentaNYL   Infusion 0.5 MICROgram(s)/kG/Hr IV Continuous <Continuous>  lactulose Retention Enema 200 Gram(s) Rectal daily  norepinephrine Infusion 0.05 MICROgram(s)/kG/Min IV Continuous <Continuous>  piperacillin/tazobactam IVPB.. 3.375 Gram(s) IV Intermittent every 8 hours  propofol Infusion 10.098 MICROgram(s)/kG/Min IV Continuous <Continuous>  sodium chloride 0.9% lock flush 10 milliLiter(s) IV Push every 1 hour PRN    Drug Dosing Weight  Height (cm): 170.2 (15 Sep 2023 11:37)  Weight (kg): 64.7 (16 Sep 2023 16:30)  BMI (kg/m2): 22.3 (16 Sep 2023 16:30)  BSA (m2): 1.75 (16 Sep 2023 16:30)    CENTRAL LINE: [ ] YES [ ] NO  LOCATION:   DATE INSERTED:  REMOVE: [ ] YES [ ] NO  EXPLAIN:    REYNOLDS: [ ] YES [ ] NO    DATE INSERTED:  REMOVE:  [ ] YES [ ] NO  EXPLAIN:    A-LINE:  [ ] YES [ ] NO  LOCATION:   DATE INSERTED:  REMOVE:  [ ] YES [ ] NO  EXPLAIN:    PMH -reviewed admission note, no change since admission  PAST MEDICAL & SURGICAL HISTORY:  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      Liver cirrhosis  alcoholic/WALL cirrhosis c/b variceal bleed s/p banding (8/2018) and hepatic encephalopathy (several years ago)      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)      Pancreatitis  2/2 choledocholithiasis s/p ERCP with stone extraction and plastic stent placement (11/2019, stent now removed)      Hematochezia  2/2 colonic AVMs s/p cauterization and hemorrhoids (11/2019)      Melena  2/2 duodenal ulcers s/p argon plasma coagulation (4/2020)      Chronic kidney disease (CKD)      GERD (gastroesophageal reflux disease)      Hypothyroidism      H/O inguinal hernia repair      History of repair of hip fracture  R hip      History of hip replacement  10/2020      H/O spinal stenosis          ICU Vital Signs Last 24 Hrs  T(C): 36 (18 Sep 2023 07:00), Max: 36.9 (17 Sep 2023 19:00)  T(F): 96.8 (18 Sep 2023 07:00), Max: 98.4 (17 Sep 2023 19:00)  HR: 58 (18 Sep 2023 08:15) (55 - 76)  BP: 102/58 (18 Sep 2023 07:00) (61/48 - 160/67)  BP(mean): 71 (18 Sep 2023 07:00) (54 - 93)  ABP: --  ABP(mean): --  RR: 16 (18 Sep 2023 07:00) (16 - 19)  SpO2: 100% (18 Sep 2023 08:15) (99% - 100%)    O2 Parameters below as of 18 Sep 2023 06:00  Patient On (Oxygen Delivery Method): ventilator    O2 Concentration (%): 50        ABG - ( 18 Sep 2023 03:23 )  pH, Arterial: 7.42  pH, Blood: x     /  pCO2: 23    /  pO2: 177   / HCO3: 15    / Base Excess: -7.6  /  SaO2: 100                   09-17 @ 07:01  -  09-18 @ 07:00  --------------------------------------------------------  IN: 998.8 mL / OUT: 365 mL / NET: 633.8 mL        Mode: AC/ CMV (Assist Control/ Continuous Mandatory Ventilation)  RR (machine): 16  TV (machine): 400  FiO2: 50  PEEP: 5  ITime: 1  MAP: 9  PIP: 23      PHYSICAL EXAM:    GENERAL: NAD, intubated and sedated   HEAD:  Atraumatic, Normocephalic  EYES: pointpoint pupils, conjunctiva and sclera clear  ENMT: No tonsillar erythema, exudates, or enlargement; Moist mucous membranes, Good dentition, No lesions  NECK: Supple, normal appearance, No JVD; Normal thyroid; Trachea midline  NERVOUS SYSTEM:  Alert & Oriented X0, intubated and sedated, CHEST/LUNG: Rhonchi heard b/l; Normal percussion bilaterally; No rales, , wheezing   HEART: Regular rate and rhythm; No murmurs, rubs, or gallops  ABDOMEN: Soft, Nontender, Nondistended; Bowel sounds present  EXTREMITIES:  2+ Peripheral Pulses, No clubbing, cyanosis, or edema  LYMPH: No lymphadenopathy noted  SKIN: No rashes or lesions;  Good capillary refill      LABS:  CBC Full  -  ( 18 Sep 2023 03:25 )  WBC Count : 11.54 K/uL  RBC Count : 4.26 M/uL  Hemoglobin : 11.2 g/dL  Hematocrit : 33.3 %  Platelet Count - Automated : 137 K/uL  Mean Cell Volume : 78.2 fl  Mean Cell Hemoglobin : 26.3 pg  Mean Cell Hemoglobin Concentration : 33.6 gm/dL  Auto Neutrophil # : 10.03 K/uL  Auto Lymphocyte # : 0.67 K/uL  Auto Monocyte # : 0.65 K/uL  Auto Eosinophil # : 0.06 K/uL  Auto Basophil # : 0.03 K/uL  Auto Neutrophil % : 86.9 %  Auto Lymphocyte % : 5.8 %  Auto Monocyte % : 5.6 %  Auto Eosinophil % : 0.5 %  Auto Basophil % : 0.3 %    09-18    137  |  108  |  26<H>  ----------------------------<  113<H>  3.8   |  16<L>  |  1.82<H>    Ca    8.5      18 Sep 2023 03:25  Phos  3.0     09-18  Mg     1.9     09-18    TPro  5.5<L>  /  Alb  2.4<L>  /  TBili  1.5<H>  /  DBili  x   /  AST  12  /  ALT  8<L>  /  AlkPhos  131<H>  09-18    PT/INR - ( 18 Sep 2023 05:00 )   PT: 28.5 sec;   INR: 2.57 ratio         PTT - ( 18 Sep 2023 05:00 )  PTT:51.2 sec  Urinalysis Basic - ( 18 Sep 2023 03:25 )    Color: x / Appearance: x / SG: x / pH: x  Gluc: 113 mg/dL / Ketone: x  / Bili: x / Urobili: x   Blood: x / Protein: x / Nitrite: x   Leuk Esterase: x / RBC: x / WBC x   Sq Epi: x / Non Sq Epi: x / Bacteria: x      Culture Results:   No growth (09-15 @ 19:50)  Culture Results:   Growth in aerobic bottle: Staphylococcus capitis  Coagulase Negative Staphylococci isolated from a single blood culture set  may represent contamination.  Contact the Microbiology Department at [phone] if susceptibility  testing is clinically indicated.  Direct identification is available within approximately 3-5  hours either by Blood Panel Multiplexed PCR or Direct  MALDI-TOF. Details: [email or web address] (09-15 @ 14:10)  Culture Results:   No growth at 48 Hours (09-15 @ 14:00)      RADIOLOGY & ADDITIONAL STUDIES REVIEWED:  ***    [ ]GOALS OF CARE DISCUSSION WITH PATIENT/FAMILY/PROXY:    CRITICAL CARE TIME SPENT: 35 minutes

## 2023-09-18 NOTE — CONSULT NOTE ADULT - ASSESSMENT
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  Hepatology consulted for ACLF.   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 Would benefit from continued 25 % albumin (b/o SBP - 1g/bwkg/day on day#3, plus he also has KAY on CKD, consider 100 ml 25% albumin q8 hrs, and assess response  - Nephrology consult  - Strict I/O  - Avoid NSAIDs  - Hold off with diuretics    SBP  - C/w broad spectrum antibiotic coverage  - F/u final cultures (BCx, ascites Cx)  - Consider repeat Dx paracentesis    Liver lesion  - Once acute issues resolved, will need MRI abd w/wo for better characterization, given the other intraductal mass, likely will also need tissue sampling and metastatic workup (all once acute issues resolved)  - Send AFP, CEA, CA 19-9    Anemia  - No reported bleeding, monitor for bleeding  - Keep Hb >7, and if bleeding PLT >50, fibrinogen > 120  - On GRN coverage, and PPI already, IF GIB, will need octreotide    Mental status - sedated, unable to assess, monitor for signs of increased ICP, and assure 2BM/day.    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  Hepatology consulted for ACLF.   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 KAY on CKD  Liver lesion  Biliary dilation  MELD 3.0 24    KAY vs. KAY on CKD  - C/w supportive measures per ICU, keep MAP > 35 mmHg  - Got albumin on 9/16. Would benefit from continued 25 % albumin (b/o SBP - 1g/bwkg/day on day#3, plus he also has KAY on CKD), consider 100 ml 25% albumin q8 hrs, and assess response  - Nephrology consult  - Strict I/O  - Avoid NSAIDs  - Hold off with diuretics    SBP  - C/w broad spectrum antibiotic coverage  - F/u final cultures (BCx, ascites Cx)  - Consider repeat Dx paracentesis    Liver lesion  - Once acute issues resolved, will need MRI abd w/wo for better characterization, given the other intraductal mass, likely will also need tissue sampling and metastatic workup (all once acute issues resolved)  - Send AFP, CEA, CA 19-9    Anemia  - No reported bleeding, monitor for bleeding  - Keep Hb >7, and if bleeding PLT >50, fibrinogen > 120  - On GRN coverage, and PPI already, IF GIB, will need octreotide    Mental status - sedated, unable to assess, monitor for signs of increased ICP, and assure 2BM/day. Assess mental status during sedation breaks.     Thank you for consult  Will continue to monitor  D/w ICU

## 2023-09-19 NOTE — PROGRESS NOTE ADULT - SUBJECTIVE AND OBJECTIVE BOX
ICU VISIT  83y Male    Meds:  piperacillin/tazobactam IVPB.. 3.375 Gram(s) IV Intermittent every 8 hours    Allergies    No Known Allergies    Intolerances        VITALS:  Vital Signs Last 24 Hrs  T(C): 36 (19 Sep 2023 19:15), Max: 36.6 (19 Sep 2023 03:15)  T(F): 96.8 (19 Sep 2023 19:15), Max: 97.9 (19 Sep 2023 03:15)  HR: 72 (19 Sep 2023 19:15) (62 - 89)  BP: 95/54 (19 Sep 2023 19:15) (75/49 - 153/129)  BP(mean): 65 (19 Sep 2023 19:15) (58 - 139)  RR: 19 (19 Sep 2023 19:15) (16 - 34)  SpO2: 100% (19 Sep 2023 19:15) (93% - 100%)    Parameters below as of 19 Sep 2023 03:00  Patient On (Oxygen Delivery Method): ventilator    O2 Concentration (%): 35    LABS/DIAGNOSTIC TESTS:                          11.4   12.03 )-----------( 107      ( 19 Sep 2023 03:12 )             32.9         09-19    134<L>  |  105  |  25<H>  ----------------------------<  104<H>  3.8   |  17<L>  |  1.93<H>    Ca    8.5      19 Sep 2023 03:12  Phos  2.7     09-19  Mg     2.0     09-19    TPro  5.4<L>  /  Alb  2.3<L>  /  TBili  2.0<H>  /  DBili  x   /  AST  15  /  ALT  8<L>  /  AlkPhos  179<H>  09-19      LIVER FUNCTIONS - ( 19 Sep 2023 03:12 )  Alb: 2.3 g/dL / Pro: 5.4 g/dL / ALK PHOS: 179 U/L / ALT: 8 U/L DA / AST: 15 U/L / GGT: x             CULTURES: Abdominal Fl Abdominal Fluid  09-15 @ 19:50   No growth  --    polymorphonuclear leukocytes seen  No organisms seen  by cytocentrifuge      .Blood Blood-Peripheral  09-15 @ 14:10   Growth in aerobic bottle: Staphylococcus capitis  Coagulase Negative Staphylococci isolated from a single blood culture set  may represent contamination.  Contact the Microbiology Department at [phone] if susceptibility  testing is clinically indicated.  Direct identification is available within approximately 3-5  hours either by Blood Panel Multiplexed PCR or Direct  MALDI-TOF. Details: [email or web address]  --  Blood Culture PCR      .Blood Blood-Peripheral  09-15 @ 14:00   No growth at 72 Hours  --  --            RADIOLOGY:      ROS:  [  ] UNABLE TO ELICIT ICU VISIT  83y Male who remains in the ICU , he remains intubated , sedated and vent dependent on a FIO2 of 35% and PEEP of 5 , he is on 1 pressor still. He has no fevers and his WBC count is only marginally elevated. He is on a warming blanket , his NGT was just replaced today.     Meds:  piperacillin/tazobactam IVPB.. 3.375 Gram(s) IV Intermittent every 8 hours    Allergies    No Known Allergies    Intolerances        VITALS:  Vital Signs Last 24 Hrs  T(C): 36 (19 Sep 2023 19:15), Max: 36.6 (19 Sep 2023 03:15)  T(F): 96.8 (19 Sep 2023 19:15), Max: 97.9 (19 Sep 2023 03:15)  HR: 72 (19 Sep 2023 19:15) (62 - 89)  BP: 95/54 (19 Sep 2023 19:15) (75/49 - 153/129)  BP(mean): 65 (19 Sep 2023 19:15) (58 - 139)  RR: 19 (19 Sep 2023 19:15) (16 - 34)  SpO2: 100% (19 Sep 2023 19:15) (93% - 100%)    Parameters below as of 19 Sep 2023 03:00  Patient On (Oxygen Delivery Method): ventilator    O2 Concentration (%): 35    LABS/DIAGNOSTIC TESTS:                          11.4   12.03 )-----------( 107      ( 19 Sep 2023 03:12 )             32.9         09-19    134<L>  |  105  |  25<H>  ----------------------------<  104<H>  3.8   |  17<L>  |  1.93<H>    Ca    8.5      19 Sep 2023 03:12  Phos  2.7     09-19  Mg     2.0     09-19    TPro  5.4<L>  /  Alb  2.3<L>  /  TBili  2.0<H>  /  DBili  x   /  AST  15  /  ALT  8<L>  /  AlkPhos  179<H>  09-19      LIVER FUNCTIONS - ( 19 Sep 2023 03:12 )  Alb: 2.3 g/dL / Pro: 5.4 g/dL / ALK PHOS: 179 U/L / ALT: 8 U/L DA / AST: 15 U/L / GGT: x             CULTURES: Abdominal Fl Abdominal Fluid  09-15 @ 19:50   No growth  --    polymorphonuclear leukocytes seen  No organisms seen  by cytocentrifuge      .Blood Blood-Peripheral  09-15 @ 14:10   Growth in aerobic bottle: Staphylococcus capitis  Coagulase Negative Staphylococci isolated from a single blood culture set  may represent contamination.  Contact the Microbiology Department at [phone] if susceptibility  testing is clinically indicated.  Direct identification is available within approximately 3-5  hours either by Blood Panel Multiplexed PCR or Direct  MALDI-TOF. Details: [email or web address]  --  Blood Culture PCR      .Blood Blood-Peripheral  09-15 @ 14:00   No growth at 72 Hours  --  --            RADIOLOGY:      ROS:  [ x ] UNABLE TO ELICIT

## 2023-09-19 NOTE — PROGRESS NOTE ADULT - SUBJECTIVE AND OBJECTIVE BOX
Chief Complaint:  Patient is a 83y old  Male who presents with a chief complaint of Sepsis (19 Sep 2023 08:12)      Reason for consult: ACLF    Interval Events: Patient was seen and examined at bedside in ICU. He has been off sedation since yesterday. Opens eyes to verbal stimuli and squeezes R hand, but than he got combative. Cr has not improved, has not started albumin yet before the am BW. No reported BM today yet.     Hospital Medications:  acetaminophen     Tablet .. 650 milliGRAM(s) Oral every 6 hours PRN  albumin human 25% IVPB 100 milliLiter(s) IV Intermittent every 8 hours  chlorhexidine 0.12% Liquid 15 milliLiter(s) Oral Mucosa every 12 hours  chlorhexidine 2% Cloths 1 Application(s) Topical daily  lactulose Syrup 30 Gram(s) Oral three times a day  norepinephrine Infusion 0.05 MICROgram(s)/kG/Min IV Continuous <Continuous>  pantoprazole  Injectable 40 milliGRAM(s) IV Push daily  piperacillin/tazobactam IVPB.. 3.375 Gram(s) IV Intermittent every 8 hours  sodium chloride 0.9% lock flush 10 milliLiter(s) IV Push every 1 hour PRN      ROS:   Unable to obtain due to patient condition    PHYSICAL EXAM:   Vital Signs:  Vital Signs Last 24 Hrs  T(C): 35.9 (19 Sep 2023 17:00), Max: 36.6 (19 Sep 2023 03:15)  T(F): 96.6 (19 Sep 2023 17:00), Max: 97.9 (19 Sep 2023 03:15)  HR: 86 (19 Sep 2023 17:00) (62 - 89)  BP: 100/69 (19 Sep 2023 17:00) (75/49 - 153/129)  BP(mean): 79 (19 Sep 2023 17:00) (58 - 139)  RR: 22 (19 Sep 2023 17:00) (16 - 34)  SpO2: 100% (19 Sep 2023 17:00) (93% - 100%)    Parameters below as of 19 Sep 2023 03:00  Patient On (Oxygen Delivery Method): ventilator    O2 Concentration (%): 35  Daily     Daily Weight in k.3 (19 Sep 2023 07:15)    GENERAL: ill appearing  NEURO: Opens eyes to verbal stimuli and squeezes R hand, but than he got combative.   HEENT: icteric sclera  CHEST: intubated, on vent  CARDIAC: regular rate, rhythm  ABDOMEN: soft, non-tender, mildly distended, no rebound or guarding, BS+  EXTREMITIES: warm, well perfused, no edema  SKIN: no rash    LABS: reviewed                         )-----------( 107      ( 19 Sep 2023 03:12 )             32.9         134<L>  |  105  |  25<H>  ----------------------------<  104<H>  3.8   |  17<L>  |  1.93<H>    Ca    8.5      19 Sep 2023 03:12  Phos  2.7       Mg     2.0         TPro  5.4<L>  /  Alb  2.3<L>  /  TBili  2.0<H>  /  DBili  x   /  AST  15  /  ALT  8<L>  /  AlkPhos  179<H>      LIVER FUNCTIONS - ( 19 Sep 2023 03:12 )  Alb: 2.3 g/dL / Pro: 5.4 g/dL / ALK PHOS: 179 U/L / ALT: 8 U/L DA / AST: 15 U/L / GGT: x             Interval Diagnostic Studies: see sunrise for full report

## 2023-09-19 NOTE — PHARMACOTHERAPY INTERVENTION NOTE - COMMENTS
GI prophylaxis recommended. 
IV to NG 
Reminded prescriber to obtain ID approval for Zosyn, per hospital policy. As well as potentially obtain new blood culture.

## 2023-09-19 NOTE — PROCEDURE NOTE - NSPOSTPRCRAD_GEN_A_CORE
post-procedure radiography performed
verified via bubble study/central line located in the superior vena cava

## 2023-09-19 NOTE — PROGRESS NOTE ADULT - ASSESSMENT
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  Hepatology consulted for ACLF.   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 KAY on CKD  Liver lesion  Biliary dilation  MELD 3.0 24    KAY vs. KAY on CKD  - C/w supportive measures per ICU, keep MAP > 35 mmHg  - Got albumin on 9/16. C/w 25 % albumin 100 ml q8 hrs for 2 days, and assess response  - Consider nephrology consult  - Strict I/O  - Avoid NSAIDs  - Hold off with diuretics    SBP  - C/w broad spectrum antibiotic coverage  - F/u final cultures (BCx, ascites Cx)  - Consider repeat Dx paracentesis    Liver lesion  - Once acute issues resolved, will need MRI abd w/wo for better characterization, given the other intraductal mass, likely will also need tissue sampling and metastatic workup (all once acute issues resolved)  - Send AFP, CEA, CA 19-9    Anemia  - No reported bleeding, monitor for bleeding  - Keep Hb >7, and if bleeding PLT >50, fibrinogen > 120  - On GRN coverage, and PPI already, IF GIB, will need octreotide    Mental status - slowly improving off sedation, opens eyes, squeezed hand late afternoon. C/w lactulose and titrate to have 2-3 BM/day.      Thank you for consult  Will continue to monitor  D/w ICU

## 2023-09-19 NOTE — PROGRESS NOTE ADULT - ASSESSMENT
Septic shock  Spontaneous Bacterial Peritonitis  Leukocytosis - mild  Bacteremia - Contaminant      Plan - Cont Zosyn 3.375gms iv q8hrs  repeat blood cultures when feasible.  Time spent - 32 mins

## 2023-09-19 NOTE — PROGRESS NOTE ADULT - ATTENDING COMMENTS
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 As per wifehe has no  cough, runny nose. Patient has been having black stools for the past 2 days  with dark-colored urine. He also endorse chest pain, midsternal  Denies any hematemesis, fevers, shortness of breath, N/V/D.      9/16- ICU consulted for increased secretions, tachycardia, tachypnea, at risk for airway compromise with acute hypoxic resp failure using accessory muscle , will intubate and wife agreed       ASSESSMENT   - Acute Hypoxic Resp failure   - Acute encephalopathy   - Hypotension   - KAY on CKD with possibility for Hepatorenal syndrome   - Liver cirrhosis   - SBP  - Sepsis     Plan   - Awakening trial   - Monitor neurologic status  - If no improvement in mentation, obtain CT head   - Continue vent support , adjust as per ABG   - Hemodynamic support   - Titrate vaso-pressors to maintain MAP>65 as needed   - IV antibx to cover GNR in SBP  - F/U cultures and peritoneal fluid   - Albumin   - Trend LFT and NH4  - Lactulose rectally   - Ammonia level  - POCUS, monitor UOP closely. Bladder pressures to monitor for intraabdominal HTN  - EGD in June with out esophageal varices   - No obvious bleeding at this time  - Replace NGT  - DVT PI prophy  - Cont. 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 As per wifehe has no  cough, runny nose. Patient has been having black stools for the past 2 days  with dark-colored urine. He also endorse chest pain, midsternal  Denies any hematemesis, fevers, shortness of breath, N/V/D.      On 9/16 ICU consulted for increased secretions, tachycardia, tachypnea, at risk for airway compromise with acute hypoxic resp failure using accessory muscle, intubated for WOB.      ASSESSMENT   - Acute Hypoxic Resp failure   - Acute encephalopathy   - Hypotension   - KAY on CKD with possibility for Hepatorenal syndrome   - Liver cirrhosis   - SBP  - Sepsis     Plan   - SAT/SBT as tolerated daily  - Monitor neurologic status  - If no improvement in mentation, obtain CT head   - Continue vent support , adjust as per ABG   - Hemodynamic support   - Titrate vaso-pressors to maintain MAP>65 as needed   - IV antibx to cover GNR in SBP  - F/U cultures and peritoneal fluid   - Hepatology recs appreciated  - Albumin   - Trend LFT and NH4  - Lactulose rectally   - Ammonia level  - POCUS, monitor UOP closely  - EGD in June with out esophageal varices   - No obvious bleeding at this time  - Replace NGT  - DVT PI prophy  - Cont. ICU care

## 2023-09-19 NOTE — PROGRESS NOTE ADULT - ASSESSMENT
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 As per wifehe has no  cough, runny nose. Patient has been having black stools for the past 2 days. Pt noted to have increased secretions, tachycardia, tachypnea and AHRF with increased WOB. Admitted to ICU for AHRF requiring intubation for airway protection.   # Acute Hypoxic Resp failure   # AMS / Encephalopathy   # Hypotension   # KAY on CKD with possibility for Hepatorenal syndrome   # Alcohol inappropriate use   # Liver cirrhosis   # SBP  # Sepsis       _________CNS___________  Pt a&ox3 at baseline  currently intubated, sedated  Will hold off on speech and swallow eval while pt is intubated  currently off sedation  AAOX0, unable to follow commands    _________CVS___________  #Sepsis   secondary to SBP (confirmed on 9/15 with Diagnostic Para > 9000 Neutrophils)  s/p 2L NS in ED   Tachycardic   Peritoneal cultures negative, BCx     _________ RESP__________  #AHRF   CT A/P/C: basilar atelectasis,   started on Zosyn on admission  Increased work of breathing, tachy  sating 92% on 10L o2  increased secretions coughing  Intubated on vent  AB.[id number]/[id number]  Vent: 450/16/50/5, FiO2 weaned down to 35%    __________GI____________  #Alcoholic Cirrhosis  multiple hospitalization for encephalopathy    on spironolactone and lactulose  hypotensive likely due to splanchnic vasodilation, consider albumin infusion    MELD 18   INR: 1.8   CTA/P showing ascites   will hold spironolactone for now  c/w lactulose to have 2-3 bowel movements. May titrate down dose   ammonia level- 18  s/p diagnostic paracentesis 9/15- confirmed SBP    #SBP  confirmed- s/p diagnostic para in ED on 9/15 with > 9000 neutrophils   peritoneal fluid culture negative  albumin x 1 ()      ________ RENAL__________  #KAY  1.7   baseline normal from 2023  avoid nephrotoxic meds  Cr 1.57 > 1.82  f/u urine lytes     _________MSK___________  Generalized weakness  No focal deficit     __________ID____________  started on Zosyn  f/u ID recs   Gram pos clusters on one set of blood cultures    _________ENDO__________  #Hypoglycemic  s/p 2 amps D50  monitor BG    _______HEME/ONC_______  #Anemia  monitor HBG dropped by 2  Hb: 10 > 11.2    _________SKIN____________  Multiple area of echymmosis on UE  fragile skin    ________Prophylaxis_______  #DVT scd  #GI     ________GOC/ DISPO_______  ICU 83 year old  Man  from home, ambulates with walker with  hypothyroidism, alcoholic cirrhosis, duodenal ulcers, AVM's, CKD, GBS, 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 As per wifehe has no  cough, runny nose. Patient has been having black stools for the past 2 days. Pt noted to have increased secretions, tachycardia, tachypnea and AHRF with increased WOB. Admitted to ICU for AHRF requiring intubation for airway protection.   # Acute Hypoxic Resp failure   # AMS / Encephalopathy   # Hypotension   # KAY on CKD with possibility for Hepatorenal syndrome   # Alcohol inappropriate use   # Liver cirrhosis   # SBP  # Sepsis       _________CNS___________  Pt a&ox3 at baseline  currently intubated, sedated  Will hold off on speech and swallow eval while pt is intubated  currently off sedation  AAOX0, unable to follow commands    _________CVS___________  #Sepsis   secondary to SBP (confirmed on 9/15 with Diagnostic Para > 9000 Neutrophils)  s/p 2L NS in ED   Tachycardic   Peritoneal cultures negative, BCx negative (contaminated x 1)    _________ RESP__________  #AHRF   CT A/P/C: basilar atelectasis,   started on Zosyn on admission  Increased work of breathing, tachy  sating 92% on 10L o2  increased secretions coughing  Intubated on vent  AB.41/22/138/14/100 ()  Vent: 450/16/35/5     __________GI____________  #Alcoholic Cirrhosis  multiple hospitalization for encephalopathy    on spironolactone and lactulose  hypotensive likely due to splanchnic vasodilation, consider albumin infusion    MELD 18   INR: 1.8   CTA/P showing ascites   will hold spironolactone for now  c/w lactulose to have 2-3 bowel movements. May titrate down dose   ammonia level- 18, f/u repeat in AM ()  s/p diagnostic paracentesis 9/15- confirmed SBP    #SBP  confirmed- s/p diagnostic para in ED on 9/15 with > 9000 neutrophils   peritoneal fluid culture negative  albumin x 1 ()  will start albumin 100 q8 x 2 days ()        ________ RENAL__________  #KAY  1.7   baseline normal from 2023  avoid nephrotoxic meds  Cr 1.57 > 1.82 > 1.93 ()  Urine Na 12, Urine Cr 45, FeNA 0.4%  bladder pressure 8    _________MSK___________  Generalized weakness  No focal deficit     __________ID____________  started on Zosyn  f/u ID recs   Gram pos clusters on one set of blood cultures    _________ENDO__________  #Hypoglycemic  s/p 2 amps D50  monitor BG    _______HEME/ONC_______  #Anemia  monitor HBG dropped by 2  Hb: 10 > 11.2 > 11.4 ()    _________SKIN____________  Multiple area of echymmosis on UE  fragile skin    ________Prophylaxis_______  #DVT scd  #GI     ________GOC/ DISPO_______  ICU

## 2023-09-19 NOTE — PROGRESS NOTE ADULT - SUBJECTIVE AND OBJECTIVE BOX
INTERVAL HPI/OVERNIGHT EVENTS: No acute overnight events. Pt's NGT fell out yesterday. Pt seen at bedside, NAD, AAOx0, off sedation, unable to follow commands. Unable to assess ROS due to mental status    PRESSORS: [ ] YES [ ] NO  WHICH:    Antimicrobial:  piperacillin/tazobactam IVPB.. 3.375 Gram(s) IV Intermittent every 8 hours    Cardiovascular:  norepinephrine Infusion 0.05 MICROgram(s)/kG/Min IV Continuous <Continuous>    Pulmonary:    Hematalogic:    Other:  acetaminophen     Tablet .. 650 milliGRAM(s) Oral every 6 hours PRN  chlorhexidine 0.12% Liquid 15 milliLiter(s) Oral Mucosa every 12 hours  chlorhexidine 2% Cloths 1 Application(s) Topical daily  chlorhexidine 2% Cloths 1 Application(s) Topical <User Schedule>  lactulose Retention Enema 200 Gram(s) Rectal daily  pantoprazole  Injectable 40 milliGRAM(s) IV Push daily  sodium chloride 0.9% lock flush 10 milliLiter(s) IV Push every 1 hour PRN    acetaminophen     Tablet .. 650 milliGRAM(s) Oral every 6 hours PRN  chlorhexidine 0.12% Liquid 15 milliLiter(s) Oral Mucosa every 12 hours  chlorhexidine 2% Cloths 1 Application(s) Topical <User Schedule>  chlorhexidine 2% Cloths 1 Application(s) Topical daily  lactulose Retention Enema 200 Gram(s) Rectal daily  norepinephrine Infusion 0.05 MICROgram(s)/kG/Min IV Continuous <Continuous>  pantoprazole  Injectable 40 milliGRAM(s) IV Push daily  piperacillin/tazobactam IVPB.. 3.375 Gram(s) IV Intermittent every 8 hours  sodium chloride 0.9% lock flush 10 milliLiter(s) IV Push every 1 hour PRN    Drug Dosing Weight  Height (cm): 170.2 (15 Sep 2023 11:37)  Weight (kg): 64.7 (16 Sep 2023 16:30)  BMI (kg/m2): 22.3 (16 Sep 2023 16:30)  BSA (m2): 1.75 (16 Sep 2023 16:30)    CENTRAL LINE: [ ] YES [ ] NO  LOCATION:   DATE INSERTED:  REMOVE: [ ] YES [ ] NO  EXPLAIN:    REYNOLDS: [ ] YES [ ] NO    DATE INSERTED:  REMOVE:  [ ] YES [ ] NO  EXPLAIN:    A-LINE:  [ ] YES [ ] NO  LOCATION:   DATE INSERTED:  REMOVE:  [ ] YES [ ] NO  EXPLAIN:    PMH -reviewed admission note, no change since admission  PAST MEDICAL & SURGICAL HISTORY:  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      Liver cirrhosis  alcoholic/WALL cirrhosis c/b variceal bleed s/p banding (8/2018) and hepatic encephalopathy (several years ago)      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)      Pancreatitis  2/2 choledocholithiasis s/p ERCP with stone extraction and plastic stent placement (11/2019, stent now removed)      Hematochezia  2/2 colonic AVMs s/p cauterization and hemorrhoids (11/2019)      Melena  2/2 duodenal ulcers s/p argon plasma coagulation (4/2020)      Chronic kidney disease (CKD)      GERD (gastroesophageal reflux disease)      Hypothyroidism      H/O inguinal hernia repair      History of repair of hip fracture  R hip      History of hip replacement  10/2020      H/O spinal stenosis          ICU Vital Signs Last 24 Hrs  T(C): 35.9 (19 Sep 2023 07:15), Max: 36.7 (18 Sep 2023 10:45)  T(F): 96.6 (19 Sep 2023 07:15), Max: 98.1 (18 Sep 2023 10:45)  HR: 64 (19 Sep 2023 07:15) (58 - 99)  BP: 97/57 (19 Sep 2023 07:15) (75/56 - 129/63)  BP(mean): 71 (19 Sep 2023 07:15) (64 - 90)  ABP: --  ABP(mean): --  RR: 20 (19 Sep 2023 07:15) (16 - 24)  SpO2: 100% (19 Sep 2023 07:15) (100% - 100%)    O2 Parameters below as of 19 Sep 2023 03:00  Patient On (Oxygen Delivery Method): ventilator    O2 Concentration (%): 35        ABG - ( 19 Sep 2023 03:24 )  pH, Arterial: 7.41  pH, Blood: x     /  pCO2: 22    /  pO2: 138   / HCO3: 14    / Base Excess: -8.6  /  SaO2: 100                   09-18 @ 07:01  -  09-19 @ 07:00  --------------------------------------------------------  IN: 1258.9 mL / OUT: 320 mL / NET: 938.9 mL        Mode: AC/ CMV (Assist Control/ Continuous Mandatory Ventilation)  RR (machine): 16  TV (machine): 400  FiO2: 35  PEEP: 5  ITime: 0.9  MAP: 11  PIP: 27      PHYSICAL EXAM:    GENERAL: NAD, well-groomed, well-developed  HEAD:  Atraumatic, Normocephalic  EYES: EOMI, PERRLA, conjunctiva and sclera clear  ENMT: No tonsillar erythema, exudates, or enlargement; Moist mucous membranes, Good dentition, No lesions  NECK: Supple, normal appearance, No JVD; Normal thyroid; Trachea midline  NERVOUS SYSTEM:  Alert & Oriented X3,  Motor Strength 5/5 B/L upper and lower extremities; DTRs 2+ intact and symmetric  CHEST/LUNG: No chest deformity; Normal percussion bilaterally; No rales, rhonchi, wheezing   HEART: Regular rate and rhythm; No murmurs, rubs, or gallops  ABDOMEN: Soft, Nontender, Nondistended; Bowel sounds present  EXTREMITIES:  2+ Peripheral Pulses, No clubbing, cyanosis, or edema  LYMPH: No lymphadenopathy noted  SKIN: No rashes or lesions;  Good capillary refill      LABS:  CBC Full  -  ( 19 Sep 2023 03:12 )  WBC Count : 12.03 K/uL  RBC Count : 4.30 M/uL  Hemoglobin : 11.4 g/dL  Hematocrit : 32.9 %  Platelet Count - Automated : 107 K/uL  Mean Cell Volume : 76.5 fl  Mean Cell Hemoglobin : 26.5 pg  Mean Cell Hemoglobin Concentration : 34.7 gm/dL  Auto Neutrophil # : x  Auto Lymphocyte # : x  Auto Monocyte # : x  Auto Eosinophil # : x  Auto Basophil # : x  Auto Neutrophil % : x  Auto Lymphocyte % : x  Auto Monocyte % : x  Auto Eosinophil % : x  Auto Basophil % : x    09-19    134<L>  |  105  |  25<H>  ----------------------------<  104<H>  3.8   |  17<L>  |  1.93<H>    Ca    8.5      19 Sep 2023 03:12  Phos  2.7     09-19  Mg     2.0     09-19    TPro  5.4<L>  /  Alb  2.3<L>  /  TBili  2.0<H>  /  DBili  x   /  AST  15  /  ALT  8<L>  /  AlkPhos  179<H>  09-19    PT/INR - ( 19 Sep 2023 03:12 )   PT: 26.8 sec;   INR: 2.41 ratio         PTT - ( 18 Sep 2023 05:00 )  PTT:51.2 sec  Urinalysis Basic - ( 19 Sep 2023 03:12 )    Color: x / Appearance: x / SG: x / pH: x  Gluc: 104 mg/dL / Ketone: x  / Bili: x / Urobili: x   Blood: x / Protein: x / Nitrite: x   Leuk Esterase: x / RBC: x / WBC x   Sq Epi: x / Non Sq Epi: x / Bacteria: x          RADIOLOGY & ADDITIONAL STUDIES REVIEWED:  ***    [ ]GOALS OF CARE DISCUSSION WITH PATIENT/FAMILY/PROXY:    CRITICAL CARE TIME SPENT: 35 minutes

## 2023-09-20 NOTE — PROGRESS NOTE ADULT - ASSESSMENT
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  Hepatology consulted for ACLF and been following since 9/18.   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 KAY on CKD  Liver lesion  Biliary dilation  MELD 3.0 24    KAY vs. KAY on CKD  - Got 25%  albumin on 9/16 400 ml and 9/18 250 ml.   --- C/w supportive measures per ICU, keep MAP > 35 mmHg  --- C/w 25 % albumin 100 ml q8 hrs for total 2 days, and assess response. (Started 9/19.)  --- Repeat urine studies tomorrow  --- If no improvement, consider full HRS management.   --- Consider nephrology consult  --- Strict I/O  --- Avoid NSAIDs  --- Hold off with diuretics    SBP  --- C/w broad spectrum antibiotic coverage per ID  --- F/u final cultures (BCx, ascites Cx)  --- Consider repeat Dx paracentesis - Was d/w ICU, did not have suitable pocket.    Liver lesion  --- Once acute issues resolved, will need MRI abd w/wo for better characterization, given the other intraductal mass, likely will also need tissue sampling and metastatic workup (all once acute issues resolved)  --- Send AFP, CEA, CA 19-9    Anemia  - No reported bleeding, monitor for bleeding  --- Keep Hb >7, and if bleeding PLT >50, fibrinogen > 120  --- On GRN coverage, and PPI already, IF GIB, will need octreotide    Mental status   - Slowly improving off sedation, opens eyes, squeezed R hand.   - Ammonia < 10.  --- C/w lactulose and titrate to have 2-3 BM/day.    --- Consider CT head if no further improvement.     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  Hepatology consulted for ACLF and been following since 9/18.   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 KAY on CKD  Liver lesion  Biliary dilation  MELD 3.0 24- > 26    KAY vs. KAY on CKD  - Got 25%  albumin on 9/16 400 ml and 9/18 250 ml.   --- C/w supportive measures per ICU, keep MAP > 35 mmHg  --- C/w 25 % albumin 100 ml q8 hrs for total 2 days, and assess response. (Started 9/19.)  --- Repeat urine studies tomorrow  --- If no improvement, consider full HRS management.   --- Consider nephrology consult  --- Strict I/O  --- Avoid NSAIDs  --- Hold off with diuretics    SBP  --- C/w broad spectrum antibiotic coverage per ID  --- F/u final cultures (BCx, ascites Cx)  --- Consider repeat Dx paracentesis - Was d/w ICU, did not have suitable pocket.    Liver lesion  --- Once acute issues resolved, will need MRI abd w/wo for better characterization, given the other intraductal mass, likely will also need tissue sampling and metastatic workup (all once acute issues resolved)  --- Send AFP, CEA, CA 19-9    Anemia  - No reported bleeding, monitor for bleeding  --- Keep Hb >7, and if bleeding PLT >50, fibrinogen > 120  --- On GRN coverage, and PPI already, IF GIB, will need octreotide    Mental status   - Slowly improving off sedation, opens eyes, squeezed R hand.   - Ammonia < 10.  --- C/w lactulose and titrate to have 2-3 BM/day.    --- Consider CT head if no further improvement.     Thank you for consult  Will continue to monitor  D/w ICU

## 2023-09-20 NOTE — PROGRESS NOTE ADULT - SUBJECTIVE AND OBJECTIVE BOX
Chief Complaint:  Patient is a 83y old  Male who presents with a chief complaint of Sepsis (20 Sep 2023 08:10)      Reason for consult:    Interval Events:     Hospital Medications:  acetaminophen     Tablet .. 650 milliGRAM(s) Oral every 6 hours PRN  albumin human 25% IVPB 100 milliLiter(s) IV Intermittent every 8 hours  chlorhexidine 0.12% Liquid 15 milliLiter(s) Oral Mucosa every 12 hours  chlorhexidine 2% Cloths 1 Application(s) Topical daily  lactulose Syrup 30 Gram(s) Oral three times a day  norepinephrine Infusion 0.05 MICROgram(s)/kG/Min IV Continuous <Continuous>  pantoprazole   Suspension 40 milliGRAM(s) Enteral Tube daily  piperacillin/tazobactam IVPB.. 3.375 Gram(s) IV Intermittent every 8 hours  sodium chloride 0.9% lock flush 10 milliLiter(s) IV Push every 1 hour PRN      ROS:   General:  No  fevers, chills, night sweats, fatigue  Eyes:  Good vision, no reported pain  ENT:  No sore throat, pain, runny nose  CV:  No pain, palpitations  Pulm:  No dyspnea, cough  GI:  See HPI, otherwise negative  :  No  incontinence, nocturia  Muscle:  No pain, weakness  Neuro:  No memory problems  Psych:  No insomnia, mood problems, depression  Endocrine:  No polyuria, polydipsia, cold/heat intolerance  Heme:  No petechiae, ecchymosis, easy bruisability  Skin:  No rash    PHYSICAL EXAM:   Vital Signs:  Vital Signs Last 24 Hrs  T(C): 36.6 (20 Sep 2023 14:30), Max: 36.6 (20 Sep 2023 14:30)  T(F): 97.9 (20 Sep 2023 14:30), Max: 97.9 (20 Sep 2023 14:30)  HR: 61 (20 Sep 2023 14:30) (53 - 90)  BP: 108/56 (20 Sep 2023 14:30) (75/49 - 167/152)  BP(mean): 73 (20 Sep 2023 14:30) (54 - 159)  RR: 17 (20 Sep 2023 14:30) (16 - 34)  SpO2: 100% (20 Sep 2023 12:45) (94% - 100%)    Parameters below as of 20 Sep 2023 00:00  Patient On (Oxygen Delivery Method): ventilator    O2 Concentration (%): 35  Daily     Daily Weight in k (20 Sep 2023 07:15)    GENERAL: no acute distress  NEURO: alert, no asterixis  HEENT: anicteric sclera, no conjunctival pallor appreciated  CHEST: no respiratory distress, no accessory muscle use  CARDIAC: regular rate, rhythm  ABDOMEN: soft, non-tender, non-distended, no rebound or guarding  EXTREMITIES: warm, well perfused, no edema  SKIN: no lesions noted    LABS: reviewed                        10.1   9.86  )-----------( 86       ( 20 Sep 2023 04:11 )             28.7         135  |  105  |  26<H>  ----------------------------<  114<H>  3.6   |  17<L>  |  1.94<H>    Ca    8.8      20 Sep 2023 04:11  Phos  2.6       Mg     2.0         TPro  5.5<L>  /  Alb  2.8<L>  /  TBili  2.1<H>  /  DBili  x   /  AST  11  /  ALT  8<L>  /  AlkPhos  151<H>      LIVER FUNCTIONS - ( 20 Sep 2023 04:11 )  Alb: 2.8 g/dL / Pro: 5.5 g/dL / ALK PHOS: 151 U/L / ALT: 8 U/L DA / AST: 11 U/L / GGT: x             Interval Diagnostic Studies: see sunrise for full report   Chief Complaint:  Patient is a 83y old  Male who presents with a chief complaint of Sepsis (20 Sep 2023 08:10)      Reason for consult: ACLF    Interval Events: Patient was seen and examined at bedside in ICU. Off sedation, opening eyes, tries to squeeze hand on R side. Ammonia < 10. Cr has not improved, just reached 24 hrs. Urine output slightly improved, 20 cc/hr. Has not moved bowels today. Mild gaseous distention of colon.     Hospital Medications:  acetaminophen     Tablet .. 650 milliGRAM(s) Oral every 6 hours PRN  albumin human 25% IVPB 100 milliLiter(s) IV Intermittent every 8 hours  chlorhexidine 0.12% Liquid 15 milliLiter(s) Oral Mucosa every 12 hours  chlorhexidine 2% Cloths 1 Application(s) Topical daily  lactulose Syrup 30 Gram(s) Oral three times a day  norepinephrine Infusion 0.05 MICROgram(s)/kG/Min IV Continuous <Continuous>  pantoprazole   Suspension 40 milliGRAM(s) Enteral Tube daily  piperacillin/tazobactam IVPB.. 3.375 Gram(s) IV Intermittent every 8 hours  sodium chloride 0.9% lock flush 10 milliLiter(s) IV Push every 1 hour PRN      ROS:   Unable to obtain due to patient condition.    PHYSICAL EXAM:   Vital Signs:  Vital Signs Last 24 Hrs  T(C): 36.6 (20 Sep 2023 14:30), Max: 36.6 (20 Sep 2023 14:30)  T(F): 97.9 (20 Sep 2023 14:30), Max: 97.9 (20 Sep 2023 14:30)  HR: 61 (20 Sep 2023 14:30) (53 - 90)  BP: 108/56 (20 Sep 2023 14:30) (75/49 - 167/152)  BP(mean): 73 (20 Sep 2023 14:30) (54 - 159)  RR: 17 (20 Sep 2023 14:30) (16 - 34)  SpO2: 100% (20 Sep 2023 12:45) (94% - 100%)    Parameters below as of 20 Sep 2023 00:00  Patient On (Oxygen Delivery Method): ventilator    O2 Concentration (%): 35  Daily     Daily Weight in k (20 Sep 2023 07:15)    GENERAL: ill appearing  NEURO: Opens eyes to verbal stimuli and squeezes R hand   HEENT: icteric sclera  CHEST: intubated, on vent  CARDIAC: regular rate, rhythm, still on pressor  ABDOMEN: soft, non-tender, mildly distended, no rebound or guarding, BS+  EXTREMITIES: warm, well perfused, no edema  SKIN: no rash      LABS: reviewed                        10.1   9  )-----------( 86       ( 20 Sep 2023 04:11 )             28.7         135  |  105  |  26<H>  ----------------------------<  114<H>  3.6   |  17<L>  |  1.94<H>    Ca    8.8      20 Sep 2023 04:11  Phos  2.6       Mg     2.0         TPro  5.5<L>  /  Alb  2.8<L>  /  TBili  2.1<H>  /  DBili  x   /  AST  11  /  ALT  8<L>  /  AlkPhos  151<H>      LIVER FUNCTIONS - ( 20 Sep 2023 04:11 )  Alb: 2.8 g/dL / Pro: 5.5 g/dL / ALK PHOS: 151 U/L / ALT: 8 U/L DA / AST: 11 U/L / GGT: x             Interval Diagnostic Studies: see sunrise for full report

## 2023-09-20 NOTE — PROGRESS NOTE ADULT - SUBJECTIVE AND OBJECTIVE BOX
INTERVAL HPI/OVERNIGHT EVENTS: No acute overnight events. Pt seen at bedside, off sedation, wakes with verbal stimulation, AAOX0, unable to assess ROS due to mental status    PRESSORS: [x] YES [ ] NO  WHICH: levo 0.08    Antimicrobial:  piperacillin/tazobactam IVPB.. 3.375 Gram(s) IV Intermittent every 8 hours    Cardiovascular:  norepinephrine Infusion 0.05 MICROgram(s)/kG/Min IV Continuous <Continuous>    Pulmonary:    Hematalogic:    Other:  acetaminophen     Tablet .. 650 milliGRAM(s) Oral every 6 hours PRN  albumin human 25% IVPB 100 milliLiter(s) IV Intermittent every 8 hours  chlorhexidine 0.12% Liquid 15 milliLiter(s) Oral Mucosa every 12 hours  chlorhexidine 2% Cloths 1 Application(s) Topical daily  lactulose Syrup 30 Gram(s) Oral three times a day  pantoprazole  Injectable 40 milliGRAM(s) IV Push daily  sodium chloride 0.9% lock flush 10 milliLiter(s) IV Push every 1 hour PRN    acetaminophen     Tablet .. 650 milliGRAM(s) Oral every 6 hours PRN  albumin human 25% IVPB 100 milliLiter(s) IV Intermittent every 8 hours  chlorhexidine 0.12% Liquid 15 milliLiter(s) Oral Mucosa every 12 hours  chlorhexidine 2% Cloths 1 Application(s) Topical daily  lactulose Syrup 30 Gram(s) Oral three times a day  norepinephrine Infusion 0.05 MICROgram(s)/kG/Min IV Continuous <Continuous>  pantoprazole  Injectable 40 milliGRAM(s) IV Push daily  piperacillin/tazobactam IVPB.. 3.375 Gram(s) IV Intermittent every 8 hours  sodium chloride 0.9% lock flush 10 milliLiter(s) IV Push every 1 hour PRN    Drug Dosing Weight  Height (cm): 170.2 (15 Sep 2023 11:37)  Weight (kg): 64.7 (16 Sep 2023 16:30)  BMI (kg/m2): 22.3 (16 Sep 2023 16:30)  BSA (m2): 1.75 (16 Sep 2023 16:30)    CENTRAL LINE: [ ] YES [ ] NO  LOCATION:   DATE INSERTED:  REMOVE: [ ] YES [ ] NO  EXPLAIN:    REYNOLDS: [ ] YES [ ] NO    DATE INSERTED:  REMOVE:  [ ] YES [ ] NO  EXPLAIN:    A-LINE:  [ ] YES [ ] NO  LOCATION:   DATE INSERTED:  REMOVE:  [ ] YES [ ] NO  EXPLAIN:    PMH -reviewed admission note, no change since admission  PAST MEDICAL & SURGICAL HISTORY:  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      Liver cirrhosis  alcoholic/WALL cirrhosis c/b variceal bleed s/p banding (8/2018) and hepatic encephalopathy (several years ago)      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)      Pancreatitis  2/2 choledocholithiasis s/p ERCP with stone extraction and plastic stent placement (11/2019, stent now removed)      Hematochezia  2/2 colonic AVMs s/p cauterization and hemorrhoids (11/2019)      Melena  2/2 duodenal ulcers s/p argon plasma coagulation (4/2020)      Chronic kidney disease (CKD)      GERD (gastroesophageal reflux disease)      Hypothyroidism      H/O inguinal hernia repair      History of repair of hip fracture  R hip      History of hip replacement  10/2020      H/O spinal stenosis          ICU Vital Signs Last 24 Hrs  T(C): 36 (20 Sep 2023 07:30), Max: 36.3 (19 Sep 2023 10:15)  T(F): 96.8 (20 Sep 2023 07:30), Max: 97.3 (19 Sep 2023 10:15)  HR: 53 (20 Sep 2023 07:30) (53 - 89)  BP: 110/59 (20 Sep 2023 07:30) (75/49 - 167/152)  BP(mean): 75 (20 Sep 2023 07:30) (54 - 159)  ABP: --  ABP(mean): --  RR: 17 (20 Sep 2023 07:30) (16 - 34)  SpO2: 100% (20 Sep 2023 07:30) (93% - 100%)    O2 Parameters below as of 20 Sep 2023 00:00  Patient On (Oxygen Delivery Method): ventilator    O2 Concentration (%): 35        ABG - ( 19 Sep 2023 03:24 )  pH, Arterial: 7.41  pH, Blood: x     /  pCO2: 22    /  pO2: 138   / HCO3: 14    / Base Excess: -8.6  /  SaO2: 100                   09-19 @ 07:01  -  09-20 @ 07:00  --------------------------------------------------------  IN: 1519.9 mL / OUT: 395 mL / NET: 1124.9 mL        Mode: AC/ CMV (Assist Control/ Continuous Mandatory Ventilation)  RR (machine): 16  TV (machine): 400  FiO2: 35  PEEP: 5  ITime: 0.9  MAP: 10  PIP: 25      PHYSICAL EXAM:    GENERAL: NAD, wakes to verbal stimulation, AAOX0, unable to follow commands  HEAD:  Atraumatic, Normocephalic  EYES: EOMI, PERRLA, conjunctiva and sclera clear  ENMT: No tonsillar erythema, exudates, or enlargement; Moist mucous membranes, Good dentition, No lesions  NECK: Supple, normal appearance, No JVD; Normal thyroid; Trachea midline  NERVOUS SYSTEM:  Alert & Oriented X0, unable to assess strength  CHEST/LUNG: No chest deformity; Normal percussion bilaterally; No rales, rhonchi, wheezing   HEART: Regular rate and rhythm; No murmurs, rubs, or gallops  ABDOMEN: Distended, nontender, hypoactive bowel sounds.   EXTREMITIES:  2+ Peripheral Pulses, No clubbing, cyanosis, or edema  LYMPH: No lymphadenopathy noted  SKIN: No rashes or lesions;  Good capillary refill      LABS:  CBC Full  -  ( 20 Sep 2023 04:11 )  WBC Count : 9.86 K/uL  RBC Count : 3.82 M/uL  Hemoglobin : 10.1 g/dL  Hematocrit : 28.7 %  Platelet Count - Automated : 86 K/uL  Mean Cell Volume : 75.1 fl  Mean Cell Hemoglobin : 26.4 pg  Mean Cell Hemoglobin Concentration : 35.2 gm/dL  Auto Neutrophil # : x  Auto Lymphocyte # : x  Auto Monocyte # : x  Auto Eosinophil # : x  Auto Basophil # : x  Auto Neutrophil % : x  Auto Lymphocyte % : x  Auto Monocyte % : x  Auto Eosinophil % : x  Auto Basophil % : x    09-20    135  |  105  |  26<H>  ----------------------------<  114<H>  3.6   |  17<L>  |  1.94<H>    Ca    8.8      20 Sep 2023 04:11  Phos  2.6     09-20  Mg     2.0     09-20    TPro  5.5<L>  /  Alb  2.8<L>  /  TBili  2.1<H>  /  DBili  x   /  AST  11  /  ALT  8<L>  /  AlkPhos  151<H>  09-20    PT/INR - ( 19 Sep 2023 03:12 )   PT: 26.8 sec;   INR: 2.41 ratio           Urinalysis Basic - ( 20 Sep 2023 04:11 )    Color: x / Appearance: x / SG: x / pH: x  Gluc: 114 mg/dL / Ketone: x  / Bili: x / Urobili: x   Blood: x / Protein: x / Nitrite: x   Leuk Esterase: x / RBC: x / WBC x   Sq Epi: x / Non Sq Epi: x / Bacteria: x          RADIOLOGY & ADDITIONAL STUDIES REVIEWED:  ***    [ ]GOALS OF CARE DISCUSSION WITH PATIENT/FAMILY/PROXY:    CRITICAL CARE TIME SPENT: 35 minutes

## 2023-09-20 NOTE — PROGRESS NOTE ADULT - ASSESSMENT
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 As per wifehe has no  cough, runny nose. Patient has been having black stools for the past 2 days. Pt noted to have increased secretions, tachycardia, tachypnea and AHRF with increased WOB. Admitted to ICU for AHRF requiring intubation for airway protection.   # Acute Hypoxic Resp failure   # AMS / Encephalopathy   # Hypotension   # KAY on CKD with possibility for Hepatorenal syndrome   # Alcohol inappropriate use   # Liver cirrhosis   # SBP  # Sepsis       _________CNS___________  Pt a&ox3 at baseline  currently intubated, sedated  Will hold off on speech and swallow eval while pt is intubated  currently off sedation  AAOX0, unable to follow commands, wakes to verbal stimulus    _________CVS___________  #Sepsis   secondary to SBP (confirmed on 9/15 with Diagnostic Para > 9000 Neutrophils)  s/p 2L NS in ED   Tachycardic   Peritoneal cultures negative, BCx negative (contaminated x 1)  WBC 12.03 > 9.86    _________ RESP__________  #AHRF   CT A/P/C: basilar atelectasis,   started on Zosyn on admission  Increased work of breathing, tachy  sating 92% on 10L o2  increased secretions coughing  Intubated on vent  AB.41/22/138/14/100 ()  Vent: 400/16/35/5     __________GI____________  #Alcoholic Cirrhosis  multiple hospitalization for encephalopathy    on spironolactone and lactulose  hypotensive likely due to splanchnic vasodilation, consider albumin infusion    MELD 18   INR: 1.8   CTA/P showing ascites   will hold spironolactone for now  c/w lactulose to have 2-3 bowel movements. May titrate down dose   ammonia level- 18, f/u repeat in AM ()  s/p diagnostic paracentesis 9/15- confirmed SBP    #SBP  confirmed- s/p diagnostic para in ED on 9/15 with > 9000 neutrophils   peritoneal fluid culture negative  albumin x 1 ()  started albumin 100 q8 x 2 days ()        ________ RENAL__________  #KAY  1.7   baseline normal from 2023  avoid nephrotoxic meds  Cr 1.57 > 1.82 > 1.93 > 1.94 ()  Urine Na 12, Urine Cr 45, FeNA 0.4%  bladder pressure 8    _________MSK___________  Generalized weakness  No focal deficit     __________ID____________  started on Zosyn  f/u ID recs   Gram pos clusters on one set of blood cultures    _________ENDO__________  #Hypoglycemic  s/p 2 amps D50  monitor BG    _______HEME/ONC_______  #Anemia  monitor HBG dropped by 2  Hb: 10 > 11.2 > 11.4 > 10.1 ()    _________SKIN____________  Multiple area of echymosis on UE  fragile skin    ________Prophylaxis_______  #DVT scd  #GI     ________GOC/ DISPO_______  ICU

## 2023-09-20 NOTE — PROGRESS NOTE ADULT - ATTENDING COMMENTS
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 As per wifehe has no  cough, runny nose. Patient has been having black stools for the past 2 days  with dark-colored urine. He also endorse chest pain, midsternal  Denies any hematemesis, fevers, shortness of breath, N/V/D.      On 9/16 ICU consulted for increased secretions, tachycardia, tachypnea, at risk for airway compromise with acute hypoxic resp failure using accessory muscle, intubated for WOB.      ASSESSMENT   - Acute Hypoxic Resp failure   - Acute encephalopathy   - Hypotension   - KAY on CKD with possibility for Hepatorenal syndrome   - Liver cirrhosis   - SBP  - Sepsis     Plan   - SAT/SBT as tolerated daily  - Monitor neurologic status, more responsive today  - If no improvement in mentation, obtain CT head   - Continue vent support , adjust as per ABG   - Hemodynamic support   - Titrate vaso-pressors to maintain MAP>65 as needed --> decreasing requirements  - IV antibx to cover GNR in SBP  - F/U cultures and peritoneal fluid --> BCx NGTD x 4d  - Hepatology recs appreciated  - Albumin   - Trend LFT and NH4  - Lactulose rectally   - KUB eval for constipation  - POCUS, monitor UOP closely  - EGD in June without esophageal varices   - No obvious bleeding at this time  - NGT with feeds  - DVT PI prophy  - Cont. ICU care.

## 2023-09-21 NOTE — PROGRESS NOTE ADULT - SUBJECTIVE AND OBJECTIVE BOX
Chief Complaint:  Patient is a 83y old  Male who presents with a chief complaint of Sepsis (21 Sep 2023 08:51)      Reason for consult:    Interval Events:     Hospital Medications:  acetaminophen     Tablet .. 650 milliGRAM(s) Oral every 6 hours PRN  albumin human 25% IVPB 100 milliLiter(s) IV Intermittent every 8 hours  chlorhexidine 0.12% Liquid 15 milliLiter(s) Oral Mucosa every 12 hours  chlorhexidine 2% Cloths 1 Application(s) Topical daily  lactulose Syrup 30 Gram(s) Oral three times a day  midodrine 5 milliGRAM(s) Oral every 8 hours  octreotide  Infusion 50 MICROgram(s)/Hr IV Continuous <Continuous>  pantoprazole  Injectable 40 milliGRAM(s) IV Push daily  piperacillin/tazobactam IVPB.. 3.375 Gram(s) IV Intermittent every 8 hours  sodium chloride 0.9% lock flush 10 milliLiter(s) IV Push every 1 hour PRN      ROS:   Unable to obtain due to patient condition    PHYSICAL EXAM:   Vital Signs:  Vital Signs Last 24 Hrs  T(C): 37.4 (21 Sep 2023 15:00), Max: 37.6 (21 Sep 2023 13:00)  T(F): 99.3 (21 Sep 2023 15:00), Max: 99.7 (21 Sep 2023 13:00)  HR: 79 (21 Sep 2023 16:25) (59 - 110)  BP: 86/61 (21 Sep 2023 16:00) (86/61 - 143/52)  BP(mean): 71 (21 Sep 2023 16:00) (68 - 116)  RR: 17 (21 Sep 2023 16:00) (16 - 27)  SpO2: 100% (21 Sep 2023 16:25) (99% - 100%)    Parameters below as of 21 Sep 2023 08:00  Patient On (Oxygen Delivery Method): ventilator  O2 Flow (L/min): 35    Daily     Daily Weight in k.7 (21 Sep 2023 08:00)        LABS: reviewed                        9.6    8.11  )-----------( 56       ( 21 Sep 2023 04:23 )             27.7     09-21    137  |  105  |  27<H>  ----------------------------<  169<H>  3.5   |  17<L>  |  2.02<H>    Ca    8.6      21 Sep 2023 04:23  Phos  2.9       Mg     2.0         TPro  6.4  /  Alb  3.4<L>  /  TBili  1.8<H>  /  DBili  x   /  AST  11  /  ALT  10  /  AlkPhos  149<H>      LIVER FUNCTIONS - ( 21 Sep 2023 04:23 )  Alb: 3.4 g/dL / Pro: 6.4 g/dL / ALK PHOS: 149 U/L / ALT: 10 U/L DA / AST: 11 U/L / GGT: x             Interval Diagnostic Studies: see sunrise for full report   Chief Complaint:  Patient is a 83y old  Male who presents with a chief complaint of Sepsis (21 Sep 2023 08:51)      Reason for consult: ACLF    Interval Events: Cr w/o improvement despite 2 days albumin.    Hospital Medications:  acetaminophen     Tablet .. 650 milliGRAM(s) Oral every 6 hours PRN  albumin human 25% IVPB 100 milliLiter(s) IV Intermittent every 8 hours  chlorhexidine 0.12% Liquid 15 milliLiter(s) Oral Mucosa every 12 hours  chlorhexidine 2% Cloths 1 Application(s) Topical daily  lactulose Syrup 30 Gram(s) Oral three times a day  midodrine 5 milliGRAM(s) Oral every 8 hours  octreotide  Infusion 50 MICROgram(s)/Hr IV Continuous <Continuous>  pantoprazole  Injectable 40 milliGRAM(s) IV Push daily  piperacillin/tazobactam IVPB.. 3.375 Gram(s) IV Intermittent every 8 hours  sodium chloride 0.9% lock flush 10 milliLiter(s) IV Push every 1 hour PRN      ROS:   Unable to obtain due to patient condition    PHYSICAL EXAM:   Vital Signs:  Vital Signs Last 24 Hrs  T(C): 37.4 (21 Sep 2023 15:00), Max: 37.6 (21 Sep 2023 13:00)  T(F): 99.3 (21 Sep 2023 15:00), Max: 99.7 (21 Sep 2023 13:00)  HR: 79 (21 Sep 2023 16:25) (59 - 110)  BP: 86/61 (21 Sep 2023 16:00) (86/61 - 143/52)  BP(mean): 71 (21 Sep 2023 16:00) (68 - 116)  RR: 17 (21 Sep 2023 16:00) (16 - 27)  SpO2: 100% (21 Sep 2023 16:25) (99% - 100%)    Parameters below as of 21 Sep 2023 08:00  Patient On (Oxygen Delivery Method): ventilator  O2 Flow (L/min): 35    Daily     Daily Weight in k.7 (21 Sep 2023 08:00)        LABS: reviewed                        9.6    8.11  )-----------( 56       ( 21 Sep 2023 04:23 )             27.7     09-21    137  |  105  |  27<H>  ----------------------------<  169<H>  3.5   |  17<L>  |  2.02<H>    Ca    8.6      21 Sep 2023 04:23  Phos  2.9       Mg     2.0         TPro  6.4  /  Alb  3.4<L>  /  TBili  1.8<H>  /  DBili  x   /  AST  11  /  ALT  10  /  AlkPhos  149<H>      LIVER FUNCTIONS - ( 21 Sep 2023 04:23 )  Alb: 3.4 g/dL / Pro: 6.4 g/dL / ALK PHOS: 149 U/L / ALT: 10 U/L DA / AST: 11 U/L / GGT: x             Interval Diagnostic Studies: see sunrise for full report

## 2023-09-21 NOTE — PROGRESS NOTE ADULT - ASSESSMENT
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 As per wifehe has no  cough, runny nose. Patient has been having black stools for the past 2 days. Pt noted to have increased secretions, tachycardia, tachypnea and AHRF with increased WOB. Admitted to ICU for AHRF requiring intubation for airway protection.   # Acute Hypoxic Resp failure   # AMS / Encephalopathy   # Hypotension   # KAY on CKD with possibility for Hepatorenal syndrome   # Alcohol inappropriate use   # Liver cirrhosis   # SBP  # Sepsis       _________CNS___________  Pt a&ox3 at baseline  currently intubated, sedated  Will hold off on speech and swallow eval while pt is intubated  currently off sedation  AAOX0, unable to follow commands, not responding to verbal stimulus  f/u CTH ()  palliative consulted ()    _________CVS___________  #Sepsis   secondary to SBP (confirmed on 9/15 with Diagnostic Para > 9000 Neutrophils)  s/p 2L NS in ED   Tachycardic   Peritoneal cultures negative, BCx negative (contaminated x 1)  WBC 12.03 > 9.86 > 8.11 ()    _________ RESP__________  #AHRF   CT A/P/C: basilar atelectasis,   started on Zosyn on admission  Increased work of breathing, tachy  sating 92% on 10L o2  increased secretions coughing  Intubated on vent  AB.34/29/152/16/100 ()  Vent: 400/16/35/5     __________GI____________  #Alcoholic Cirrhosis  multiple hospitalization for encephalopathy    on spironolactone and lactulose  hypotensive likely due to splanchnic vasodilation, consider albumin infusion    MELD 18   INR: 1.8   CTA/P showing ascites   will hold spironolactone for now  c/w lactulose to have 2-3 bowel movements. May titrate down dose   ammonia level- 18, f/u repeat in AM ()  s/p diagnostic paracentesis 9/15- confirmed SBP  started octreotide and midodrine for HRS ()    #SBP  confirmed- s/p diagnostic para in ED on 9/15 with > 9000 neutrophils   peritoneal fluid culture negative  albumin x 1 ()  finished albumin 100 q8 x 2 days (started on )        ________ RENAL__________  #KAY  1.7   baseline normal from 2023  avoid nephrotoxic meds  Cr 1.57 > 1.82 > 1.93 > 1.94 > 2.02 ()  Urine Na 12, Urine Cr 45, FeNA 0.4%  bladder pressure 8  Nephro Dr. Dee consulted  started octreotide and midodrine for HRS ()    _________MSK___________  Generalized weakness  No focal deficit     __________ID____________  started on Zosyn  f/u ID recs   Gram pos clusters on one set of blood cultures    _________ENDO__________  #Hypoglycemic  s/p 2 amps D50  monitor BG    _______HEME/ONC_______  #Anemia  monitor HBG dropped by 2  Hb: 10 > 11.2 > 11.4 > 10.1 ()    _________SKIN____________  Multiple area of echymosis on UE  fragile skin    ________Prophylaxis_______  #DVT scd  #GI     ________GOC/ DISPO_______  ICU

## 2023-09-21 NOTE — PROGRESS NOTE ADULT - ATTENDING COMMENTS
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 As per wifehe has no  cough, runny nose. Patient has been having black stools for the past 2 days  with dark-colored urine. He also endorse chest pain, midsternal  Denies any hematemesis, fevers, shortness of breath, N/V/D.    On 9/16 ICU consulted for increased secretions, tachycardia, tachypnea, at risk for airway compromise with acute hypoxic resp failure using accessory muscle, intubated for WOB.      ASSESSMENT   - Acute Hypoxic Resp failure   - Acute encephalopathy   - Hypotension   - KAY on CKD with possibility for Hepatorenal syndrome   - Liver cirrhosis   - SBP  - Sepsis       Plan   - SAT/SBT as tolerated daily  - Monitor neurologic status, given no significant improvement in mentation, obtain CT head   - Continue vent support , adjust as per ABG   - Hemodynamic support   - Titrate vaso-pressors to maintain MAP>65 as needed --> off pressors overnight  - IV antibx to cover GNR in SBP  - F/U cultures and peritoneal fluid --> BCx NGTD x 4d  - Hepatology recs appreciated, midorine and octreotide for possible HRS  - Nephrology c/s  - No response to albumin   - Trend LFT and NH4  - Lactulose rectally   - KUB eval for constipation  - POCUS, monitor UOP closely  - EGD in June without esophageal varices   - No obvious bleeding at this time  - NGT in place, holding TF for colonic ileus  - DVT PI prophy  - Cont. ICU care

## 2023-09-21 NOTE — PROGRESS NOTE ADULT - SUBJECTIVE AND OBJECTIVE BOX
INTERVAL HPI/OVERNIGHT EVENTS: ***    PRESSORS: [ ] YES [ ] NO  WHICH:    Antimicrobial:  piperacillin/tazobactam IVPB.. 3.375 Gram(s) IV Intermittent every 8 hours    Cardiovascular:  norepinephrine Infusion 0.05 MICROgram(s)/kG/Min IV Continuous <Continuous>    Pulmonary:    Hematalogic:    Other:  acetaminophen     Tablet .. 650 milliGRAM(s) Oral every 6 hours PRN  chlorhexidine 0.12% Liquid 15 milliLiter(s) Oral Mucosa every 12 hours  chlorhexidine 2% Cloths 1 Application(s) Topical daily  lactulose Syrup 30 Gram(s) Oral three times a day  pantoprazole   Suspension 40 milliGRAM(s) Enteral Tube daily  sodium chloride 0.9% lock flush 10 milliLiter(s) IV Push every 1 hour PRN    acetaminophen     Tablet .. 650 milliGRAM(s) Oral every 6 hours PRN  chlorhexidine 0.12% Liquid 15 milliLiter(s) Oral Mucosa every 12 hours  chlorhexidine 2% Cloths 1 Application(s) Topical daily  lactulose Syrup 30 Gram(s) Oral three times a day  norepinephrine Infusion 0.05 MICROgram(s)/kG/Min IV Continuous <Continuous>  pantoprazole   Suspension 40 milliGRAM(s) Enteral Tube daily  piperacillin/tazobactam IVPB.. 3.375 Gram(s) IV Intermittent every 8 hours  sodium chloride 0.9% lock flush 10 milliLiter(s) IV Push every 1 hour PRN    Drug Dosing Weight  Height (cm): 170.2 (15 Sep 2023 11:37)  Weight (kg): 64.7 (16 Sep 2023 16:30)  BMI (kg/m2): 22.3 (16 Sep 2023 16:30)  BSA (m2): 1.75 (16 Sep 2023 16:30)    CENTRAL LINE: [ ] YES [ ] NO  LOCATION:   DATE INSERTED:  REMOVE: [ ] YES [ ] NO  EXPLAIN:    REYNOLDS: [ ] YES [ ] NO    DATE INSERTED:  REMOVE:  [ ] YES [ ] NO  EXPLAIN:    A-LINE:  [ ] YES [ ] NO  LOCATION:   DATE INSERTED:  REMOVE:  [ ] YES [ ] NO  EXPLAIN:    PMH -reviewed admission note, no change since admission  PAST MEDICAL & SURGICAL HISTORY:  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      Liver cirrhosis  alcoholic/WALL cirrhosis c/b variceal bleed s/p banding (8/2018) and hepatic encephalopathy (several years ago)      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)      Pancreatitis  2/2 choledocholithiasis s/p ERCP with stone extraction and plastic stent placement (11/2019, stent now removed)      Hematochezia  2/2 colonic AVMs s/p cauterization and hemorrhoids (11/2019)      Melena  2/2 duodenal ulcers s/p argon plasma coagulation (4/2020)      Chronic kidney disease (CKD)      GERD (gastroesophageal reflux disease)      Hypothyroidism      H/O inguinal hernia repair      History of repair of hip fracture  R hip      History of hip replacement  10/2020      H/O spinal stenosis          ICU Vital Signs Last 24 Hrs  T(C): 35.5 (21 Sep 2023 08:00), Max: 36.9 (20 Sep 2023 21:00)  T(F): 95.9 (21 Sep 2023 08:00), Max: 98.4 (20 Sep 2023 21:00)  HR: 64 (21 Sep 2023 08:09) (56 - 92)  BP: 104/61 (21 Sep 2023 08:00) (94/54 - 143/52)  BP(mean): 75 (21 Sep 2023 08:00) (68 - 116)  ABP: --  ABP(mean): --  RR: 17 (21 Sep 2023 08:00) (16 - 34)  SpO2: 100% (21 Sep 2023 08:09) (98% - 100%)    O2 Parameters below as of 21 Sep 2023 08:00  Patient On (Oxygen Delivery Method): ventilator  O2 Flow (L/min): 35          ABG - ( 21 Sep 2023 03:29 )  pH, Arterial: 7.34  pH, Blood: x     /  pCO2: 29    /  pO2: 152   / HCO3: 16    / Base Excess: -8.8  /  SaO2: 100                   09-20 @ 07:01  -  09-21 @ 07:00  --------------------------------------------------------  IN: 3143.7 mL / OUT: 425 mL / NET: 2718.7 mL        Mode: AC/ CMV (Assist Control/ Continuous Mandatory Ventilation)  RR (machine): 16  TV (machine): 400  FiO2: 35  PEEP: 5  ITime: 0.9  MAP: 13  PIP: 31      PHYSICAL EXAM:    GENERAL: NAD, well-groomed, well-developed  HEAD:  Atraumatic, Normocephalic  EYES: EOMI, PERRLA, conjunctiva and sclera clear  ENMT: No tonsillar erythema, exudates, or enlargement; Moist mucous membranes, Good dentition, No lesions  NECK: Supple, normal appearance, No JVD; Normal thyroid; Trachea midline  NERVOUS SYSTEM:  Alert & Oriented X3,  Motor Strength 5/5 B/L upper and lower extremities; DTRs 2+ intact and symmetric  CHEST/LUNG: No chest deformity; Normal percussion bilaterally; No rales, rhonchi, wheezing   HEART: Regular rate and rhythm; No murmurs, rubs, or gallops  ABDOMEN: Soft, Nontender, Nondistended; Bowel sounds present  EXTREMITIES:  2+ Peripheral Pulses, No clubbing, cyanosis, or edema  LYMPH: No lymphadenopathy noted  SKIN: No rashes or lesions;  Good capillary refill      LABS:  CBC Full  -  ( 21 Sep 2023 04:23 )  WBC Count : 8.11 K/uL  RBC Count : 3.63 M/uL  Hemoglobin : 9.6 g/dL  Hematocrit : 27.7 %  Platelet Count - Automated : 56 K/uL  Mean Cell Volume : 76.3 fl  Mean Cell Hemoglobin : 26.4 pg  Mean Cell Hemoglobin Concentration : 34.7 gm/dL  Auto Neutrophil # : x  Auto Lymphocyte # : x  Auto Monocyte # : x  Auto Eosinophil # : x  Auto Basophil # : x  Auto Neutrophil % : x  Auto Lymphocyte % : x  Auto Monocyte % : x  Auto Eosinophil % : x  Auto Basophil % : x    09-21    137  |  105  |  27<H>  ----------------------------<  169<H>  3.5   |  17<L>  |  2.02<H>    Ca    8.6      21 Sep 2023 04:23  Phos  2.9     09-21  Mg     2.0     09-21    TPro  6.4  /  Alb  3.4<L>  /  TBili  1.8<H>  /  DBili  x   /  AST  11  /  ALT  10  /  AlkPhos  149<H>  09-21      Urinalysis Basic - ( 21 Sep 2023 04:23 )    Color: x / Appearance: x / SG: x / pH: x  Gluc: 169 mg/dL / Ketone: x  / Bili: x / Urobili: x   Blood: x / Protein: x / Nitrite: x   Leuk Esterase: x / RBC: x / WBC x   Sq Epi: x / Non Sq Epi: x / Bacteria: x          RADIOLOGY & ADDITIONAL STUDIES REVIEWED:  ***    [ ]GOALS OF CARE DISCUSSION WITH PATIENT/FAMILY/PROXY:    CRITICAL CARE TIME SPENT: 35 minutes INTERVAL HPI/OVERNIGHT EVENTS: No acute overnight events. Pt seen at bedside, appears more lethargic compared to yesterday, not responding to verbal stimulus. AAOX0. Unable to assess ROS due to mental status    PRESSORS: [ ] YES [x] NO  WHICH:    Antimicrobial:  piperacillin/tazobactam IVPB.. 3.375 Gram(s) IV Intermittent every 8 hours    Cardiovascular:  norepinephrine Infusion 0.05 MICROgram(s)/kG/Min IV Continuous <Continuous>    Pulmonary:    Hematalogic:    Other:  acetaminophen     Tablet .. 650 milliGRAM(s) Oral every 6 hours PRN  chlorhexidine 0.12% Liquid 15 milliLiter(s) Oral Mucosa every 12 hours  chlorhexidine 2% Cloths 1 Application(s) Topical daily  lactulose Syrup 30 Gram(s) Oral three times a day  pantoprazole   Suspension 40 milliGRAM(s) Enteral Tube daily  sodium chloride 0.9% lock flush 10 milliLiter(s) IV Push every 1 hour PRN    acetaminophen     Tablet .. 650 milliGRAM(s) Oral every 6 hours PRN  chlorhexidine 0.12% Liquid 15 milliLiter(s) Oral Mucosa every 12 hours  chlorhexidine 2% Cloths 1 Application(s) Topical daily  lactulose Syrup 30 Gram(s) Oral three times a day  norepinephrine Infusion 0.05 MICROgram(s)/kG/Min IV Continuous <Continuous>  pantoprazole   Suspension 40 milliGRAM(s) Enteral Tube daily  piperacillin/tazobactam IVPB.. 3.375 Gram(s) IV Intermittent every 8 hours  sodium chloride 0.9% lock flush 10 milliLiter(s) IV Push every 1 hour PRN    Drug Dosing Weight  Height (cm): 170.2 (15 Sep 2023 11:37)  Weight (kg): 64.7 (16 Sep 2023 16:30)  BMI (kg/m2): 22.3 (16 Sep 2023 16:30)  BSA (m2): 1.75 (16 Sep 2023 16:30)    CENTRAL LINE: [ ] YES [ ] NO  LOCATION:   DATE INSERTED:  REMOVE: [ ] YES [ ] NO  EXPLAIN:    REYNOLDS: [ ] YES [ ] NO    DATE INSERTED:  REMOVE:  [ ] YES [ ] NO  EXPLAIN:    A-LINE:  [ ] YES [ ] NO  LOCATION:   DATE INSERTED:  REMOVE:  [ ] YES [ ] NO  EXPLAIN:    PMH -reviewed admission note, no change since admission  PAST MEDICAL & SURGICAL HISTORY:  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      Liver cirrhosis  alcoholic/WALL cirrhosis c/b variceal bleed s/p banding (8/2018) and hepatic encephalopathy (several years ago)      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)      Pancreatitis  2/2 choledocholithiasis s/p ERCP with stone extraction and plastic stent placement (11/2019, stent now removed)      Hematochezia  2/2 colonic AVMs s/p cauterization and hemorrhoids (11/2019)      Melena  2/2 duodenal ulcers s/p argon plasma coagulation (4/2020)      Chronic kidney disease (CKD)      GERD (gastroesophageal reflux disease)      Hypothyroidism      H/O inguinal hernia repair      History of repair of hip fracture  R hip      History of hip replacement  10/2020      H/O spinal stenosis          ICU Vital Signs Last 24 Hrs  T(C): 35.5 (21 Sep 2023 08:00), Max: 36.9 (20 Sep 2023 21:00)  T(F): 95.9 (21 Sep 2023 08:00), Max: 98.4 (20 Sep 2023 21:00)  HR: 64 (21 Sep 2023 08:09) (56 - 92)  BP: 104/61 (21 Sep 2023 08:00) (94/54 - 143/52)  BP(mean): 75 (21 Sep 2023 08:00) (68 - 116)  ABP: --  ABP(mean): --  RR: 17 (21 Sep 2023 08:00) (16 - 34)  SpO2: 100% (21 Sep 2023 08:09) (98% - 100%)    O2 Parameters below as of 21 Sep 2023 08:00  Patient On (Oxygen Delivery Method): ventilator  O2 Flow (L/min): 35          ABG - ( 21 Sep 2023 03:29 )  pH, Arterial: 7.34  pH, Blood: x     /  pCO2: 29    /  pO2: 152   / HCO3: 16    / Base Excess: -8.8  /  SaO2: 100                   09-20 @ 07:01  -  09-21 @ 07:00  --------------------------------------------------------  IN: 3143.7 mL / OUT: 425 mL / NET: 2718.7 mL        Mode: AC/ CMV (Assist Control/ Continuous Mandatory Ventilation)  RR (machine): 16  TV (machine): 400  FiO2: 35  PEEP: 5  ITime: 0.9  MAP: 13  PIP: 31      PHYSICAL EXAM:    GENERAL: NAD, not responding to verbal stimulation, AAOX0, unable to follow commands  HEAD:  Atraumatic, Normocephalic  EYES: EOMI, PERRLA, conjunctiva and sclera clear  ENMT: No tonsillar erythema, exudates, or enlargement; Moist mucous membranes, Good dentition, No lesions  NECK: Supple, normal appearance, No JVD; Normal thyroid; Trachea midline  NERVOUS SYSTEM:  Alert & Oriented X0, unable to assess strength  CHEST/LUNG: No chest deformity; Normal percussion bilaterally; No rales, rhonchi, wheezing   HEART: Regular rate and rhythm; No murmurs, rubs, or gallops  ABDOMEN: Distended, soft, hypoactive bowel sounds  EXTREMITIES:  2+ Peripheral Pulses, No clubbing, cyanosis, or edema  LYMPH: No lymphadenopathy noted  SKIN: No rashes or lesions;  Good capillary refill      LABS:  CBC Full  -  ( 21 Sep 2023 04:23 )  WBC Count : 8.11 K/uL  RBC Count : 3.63 M/uL  Hemoglobin : 9.6 g/dL  Hematocrit : 27.7 %  Platelet Count - Automated : 56 K/uL  Mean Cell Volume : 76.3 fl  Mean Cell Hemoglobin : 26.4 pg  Mean Cell Hemoglobin Concentration : 34.7 gm/dL  Auto Neutrophil # : x  Auto Lymphocyte # : x  Auto Monocyte # : x  Auto Eosinophil # : x  Auto Basophil # : x  Auto Neutrophil % : x  Auto Lymphocyte % : x  Auto Monocyte % : x  Auto Eosinophil % : x  Auto Basophil % : x    09-21    137  |  105  |  27<H>  ----------------------------<  169<H>  3.5   |  17<L>  |  2.02<H>    Ca    8.6      21 Sep 2023 04:23  Phos  2.9     09-21  Mg     2.0     09-21    TPro  6.4  /  Alb  3.4<L>  /  TBili  1.8<H>  /  DBili  x   /  AST  11  /  ALT  10  /  AlkPhos  149<H>  09-21      Urinalysis Basic - ( 21 Sep 2023 04:23 )    Color: x / Appearance: x / SG: x / pH: x  Gluc: 169 mg/dL / Ketone: x  / Bili: x / Urobili: x   Blood: x / Protein: x / Nitrite: x   Leuk Esterase: x / RBC: x / WBC x   Sq Epi: x / Non Sq Epi: x / Bacteria: x          RADIOLOGY & ADDITIONAL STUDIES REVIEWED:  ***    [ ]GOALS OF CARE DISCUSSION WITH PATIENT/FAMILY/PROXY:    CRITICAL CARE TIME SPENT: 35 minutes

## 2023-09-21 NOTE — PROGRESS NOTE ADULT - ASSESSMENT
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  Hepatology consulted for ACLF and been following since 9/18.   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 KAY on CKD  Liver lesion  Biliary dilation  MELD 3.0 24- > 26    KAY vs. KAY on CKD  - Got 25%  albumin on 9/16 400 ml and 9/18 250 ml.  and was started on 1g/bwkg/day albumin on 9/19/23  - Has not responded to 25% albumin, Cr remained ~2  --- Monitor I/O  --- C/w supportive measures per ICU, keep MAP > 65 mmHg  --- C/w 25 % albumin   --- Since no response, agree w/ HRS mgmt., adding on octreotide (usually 100 mcg sc. tid and increase to 200 mcg tid if no response), and midodrine (and keep MAP > 65mmHg)  --- Repeat urine studies (UA, UCr, Kathia)  --- Pending nephrology consult  --- Avoid NSAIDs  --- Hold off with diuretics  --- Notably, terlipressin available in select cases at [facility], to d/w nephrology    SBP  - BCx 9/15 one set neg, one set w/ Staph. capitis  - Ascites - rare Step. constellatus  --- C/w broad spectrum antibiotic coverage per ID  --- Consider repeat Dx paracentesis - Was d/w ICU, did not have suitable pocket.    Liver lesion  - AFP, CEA, CA 19-9 normal  --- Once acute issues resolved, will need MRI abd w/wo for better characterization, given the other intraductal mass, likely will also need tissue sampling and metastatic workup (all once acute issues resolved)      Anemia  - No reported bleeding, monitor for bleeding  --- Keep Hb >7, and if bleeding PLT >50, fibrinogen > 120  --- On GRN coverage, and PPI already, IF GIB, will need octreotide    Altered mental status   - Ammonia < 10.  --- Consider CT head   --- C/w lactulose and titrate to have 2-3 BM/day.      Poor prognosis - Consider GOC discussion    Thank you for consult  Will continue to monitor  D/w ICU

## 2023-09-22 NOTE — CONSULT NOTE ADULT - SUBJECTIVE AND OBJECTIVE BOX
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 As per wifehe has no  cough, runny nose. Patient has been having black stools for the past 2 days  with dark-colored urine. He also endorse chest pain, midsternal  Denies any hematemesis, fevers, shortness of breath, N/V/D.      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)      Pancreatitis  2/2 choledocholithiasis s/p ERCP with stone extraction and plastic stent placement (11/2019, stent now removed)      Hematochezia  2/2 colonic AVMs s/p cauterization and hemorrhoids (11/2019)      Melena  2/2 duodenal ulcers s/p argon plasma coagulation (4/2020)      Chronic kidney disease (CKD)      GERD (gastroesophageal reflux disease)      Hypothyroidism      H/O inguinal hernia repair      History of repair of hip fracture  R hip      History of hip replacement  10/2020      H/O spinal stenosis          SOCIAL HISTORY:    Admitted from:  home  (with HHA)           assisted living          [facility]   [ none ] Substance abuse, [ none ] Tobacco hx, [ none ] Alcohol hx, [ none ] Home Opioid Hx    FAMILY HISTORY:  Family history of ovarian cancer (Sibling)     unable to obtain from patient due to poor mentation, family unable to give information, see H&P for history  Baseline ADLs (prior to admission):    Allergies    No Known Allergies    Intolerances      Present Symptoms: Mild, Moderate, Severe  Pain:             Location -                               Aggravating factors -             Quality -             Radiation -             Timing-             Severity (0-10 scale):             Minimal acceptable level (0-10 scale):  Fatigue:  Nausea:  Lack of Appetite:   SOB:  Depression:  Anxiety:  Review of Systems: [All others negative or Unable to obtain due to poor mentation]    CPOT:    https://www.[email or web address].org/getattachment/[email or web address]/Critical-Care-Pain-Observation-Tool-(CPOT)  PAIN AD Score:   http://[email or web address]/painad.pdf (press ctrl +  left click to view)      MEDICATIONS  (STANDING):  albumin human 25% IVPB 100 milliLiter(s) IV Intermittent every 8 hours  chlorhexidine 0.12% Liquid 15 milliLiter(s) Oral Mucosa every 12 hours  chlorhexidine 2% Cloths 1 Application(s) Topical daily  midodrine 5 milliGRAM(s) Oral every 8 hours  octreotide  Infusion 50 MICROgram(s)/Hr (10 mL/Hr) IV Continuous <Continuous>  pantoprazole  Injectable 40 milliGRAM(s) IV Push daily  piperacillin/tazobactam IVPB.. 3.375 Gram(s) IV Intermittent every 8 hours    MEDICATIONS  (PRN):  acetaminophen     Tablet .. 650 milliGRAM(s) Oral every 6 hours PRN Temp greater or equal to 38C (100.4F), Mild Pain (1 - 3)  sodium chloride 0.9% lock flush 10 milliLiter(s) IV Push every 1 hour PRN Pre/post blood products, medications, blood draw, and to maintain line patency      PHYSICAL EXAM:  Vital Signs Last 24 Hrs  T(C): 36.8 (22 Sep 2023 20:00), Max: 36.8 (22 Sep 2023 20:00)  T(F): 98.2 (22 Sep 2023 20:00), Max: 98.2 (22 Sep 2023 20:00)  HR: 76 (22 Sep 2023 20:37) (63 - 80)  BP: 119/71 (22 Sep 2023 20:00) (97/59 - 130/86)  BP(mean): 85 (22 Sep 2023 20:00) (73 - 100)  RR: 24 (22 Sep 2023 20:00) (20 - 27)  SpO2: 100% (22 Sep 2023 20:37) (99% - 100%)    Parameters below as of 22 Sep 2023 07:00  Patient On (Oxygen Delivery Method): ventilator    O2 Concentration (%): 35    General: alert  oriented x ____    lethargic distressed cachexia  nonverbal  unarousable verbal    Palliative Performance Scale/Karnofsky Score:  http://npcrc.org/files/news/palliative_performance_scale_ppsv2.pdf    HEENT: no abnormal lesion, dry mouth  ET tube/trach oral lesions:  Lungs: tachypnea/labored breathing, audible excessive secretions  CV: RRR, S1S2, tachycardia  GI: soft non distended non tender  incontinent               PEG/NG/OG tube  constipation  last BM:   : incontinent  oliguria/anuria  rodriguez  Musculoskeletal: weakness x4 edema x4    ambulatory with assistance   mostly/fully bedbound/wheelchair bound  Skin: no abnormal skin lesions, poor skin turgor, pressure ulcer stage:   Neuro: no deficits, mild cognitive impairment dsyphagia/dysarthria paresis  Oral intake ability: unable/only mouth care, minimal moderate full capability    LABS:                        8.9    8.65  )-----------( 53       ( 22 Sep 2023 03:53 )             26.0     09-22    137  |  106  |  29<H>  ----------------------------<  196<H>  3.3<L>   |  17<L>  |  2.08<H>    Ca    8.8      22 Sep 2023 03:53  Phos  2.9     09-22  Mg     2.0     09-22    TPro  6.2  /  Alb  3.7  /  TBili  1.9<H>  /  DBili  x   /  AST  8<L>  /  ALT  11  /  AlkPhos  101  09-22    Urinalysis Basic - ( 22 Sep 2023 03:53 )    Color: x / Appearance: x / SG: x / pH: x  Gluc: 196 mg/dL / Ketone: x  / Bili: x / Urobili: x   Blood: x / Protein: x / Nitrite: x   Leuk Esterase: x / RBC: x / WBC x   Sq Epi: x / Non Sq Epi: x / Bacteria: x        RADIOLOGY & ADDITIONAL STUDIES:     Consult to: Discuss complex medical decision making related to goals of care    [facility] Geriatric and Palliative Consult Service:  Maribell Justa DO: cell ([phone])  Mateo Espinosa MD: cell ([phone])  Francisco Perez NP: cell ([phone])   Federico Conn LMSW: cell ([phone])   Piedad Cardenas NP: via [employer] Teams    Can contact any Palliative Team member via [employer] Teams for consults and questions      HPI:  This is an 83 year old  Male from home, ambulates with walker with PMHx of hypothyroidism, alcoholic cirrhosis, duodenal ulcers, AVM's, CKD, Guillain Erieville Syndrome in 1996 (requiring prolonged intubation), 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 Originally presented to [facility] ED on 9/15 complaining of generalized abdominal pain for past two days associated with generalized fatigue and decreased appetite. As per wife, patient had no cough or runny nose. Patient has been having black stools for the past 2 days  with dark-colored urine. He also endorse chest pain, midsternal  Denies any hematemesis, fevers, shortness of breath, N/V/D.      In ED:   Vitals: BP: 94/67mmHg, HR: 130, T: 36.7 on RA  s/p 2LNS   D50 x2   f/u Bcux and Ucx   CT A/P/C: basilar atelectasis, cirrhosis, exophytic ,mass post to liver and extending to diaphragm (hepatoma)   stabe pancreatic head mass 2.8cm   Biliary ductal dilation unchanged from prior   (15 Sep 2023 20:06)    Patient originally admitted to floor for sepsis 2/2 presumed by SBP and decompensated cirrhosis.  Started on IV Zosyn; diagnostic paracentesis performed on 9/15 confirmed SBP (cultures negative, but ascites fluid turbid with > 9000 neutrophils).  On 9/16 patient developed AHRF with worsening tachypnea, secretions, and IWOB; patient intubated and transferred to ICU, temporarily required pressors, remains on midodrine for BP support.  Patient off sedation since 9/18, but remains intubated, lethargic, minimally responsive with nonpurposeful movements.  ICU course also complicated by worsening KAY on CKD with concern for possible hepatorenal syndrome, Hepatology consulted, now  on IV Octreotide infusion.  Palliative Care consult requested for complex medical decision in context of ESLD with persistent respiratory failure and encephalopathy.    Patient examined in ICU this afternoon with wife and daughter at bedside.  Remains intubated, alert and oriented x zero.  Eyes intermittently open, but has not purposeful movements, + withdraws to pain.  No overt signs of pain or respiratory distress.  + on NG feeds.  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)      Pancreatitis  2/2 choledocholithiasis s/p ERCP with stone extraction and plastic stent placement (11/2019, stent now removed)      Hematochezia  2/2 colonic AVMs s/p cauterization and hemorrhoids (11/2019)      Melena  2/2 duodenal ulcers s/p argon plasma coagulation (4/2020)      Chronic kidney disease (CKD)      GERD (gastroesophageal reflux disease)      Hypothyroidism      H/O inguinal hernia repair      History of repair of hip fracture  R hip      History of hip replacement  10/2020      H/O spinal stenosis          SOCIAL HISTORY:    Admitted from:  home  [ none ] Substance abuse, [ none ] Tobacco hx, [ none ] Alcohol hx, [ none ] Home Opioid Hx    FAMILY HISTORY:  Family history of ovarian cancer (Sibling)    Unable to obtain from patient due to poor mentation, family unable to give information, see H&P for additional history  Baseline ADLs (prior to admission):  Unknown    Allergies    No Known Allergies    Intolerances      Present Symptoms: Mild, Moderate, Severe  Pain:             Location -    Intubated, unable to verbalize, CPOT 0                           Aggravating factors -             Quality -             Radiation -             Timing-             Severity (0-10 scale):             Minimal acceptable level (0-10 scale):  Fatigue:  Nausea:  Lack of Appetite:   SOB:  Depression:  Anxiety:  Review of Systems:  Unable to obtain due to poor mentation/encephalopathy     CPOT:  0  https://www.[email or web address]/getattachment/[email or web address]/Critical-Care-Pain-Observation-Tool-(CPOT)      MEDICATIONS  (STANDING):  albumin human 25% IVPB 100 milliLiter(s) IV Intermittent every 8 hours  chlorhexidine 0.12% Liquid 15 milliLiter(s) Oral Mucosa every 12 hours  chlorhexidine 2% Cloths 1 Application(s) Topical daily  midodrine 5 milliGRAM(s) Oral every 8 hours  octreotide  Infusion 50 MICROgram(s)/Hr (10 mL/Hr) IV Continuous <Continuous>  pantoprazole  Injectable 40 milliGRAM(s) IV Push daily  piperacillin/tazobactam IVPB.. 3.375 Gram(s) IV Intermittent every 8 hours    MEDICATIONS  (PRN):  acetaminophen     Tablet .. 650 milliGRAM(s) Oral every 6 hours PRN Temp greater or equal to 38C (100.4F), Mild Pain (1 - 3)  sodium chloride 0.9% lock flush 10 milliLiter(s) IV Push every 1 hour PRN Pre/post blood products, medications, blood draw, and to maintain line patency      PHYSICAL EXAM:  Vital Signs Last 24 Hrs  T(C): 36.8 (22 Sep 2023 20:00), Max: 36.8 (22 Sep 2023 20:00)  T(F): 98.2 (22 Sep 2023 20:00), Max: 98.2 (22 Sep 2023 20:00)  HR: 76 (22 Sep 2023 20:37) (63 - 80)  BP: 119/71 (22 Sep 2023 20:00) (97/59 - 130/86)  BP(mean): 85 (22 Sep 2023 20:00) (73 - 100)  RR: 24 (22 Sep 2023 20:00) (20 - 27)  SpO2: 100% (22 Sep 2023 20:37) (99% - 100%)    Parameters below as of 22 Sep 2023 07:00  Patient On (Oxygen Delivery Method): ventilator    O2 Concentration (%): 35    General: alert  oriented x __0__    intubated, minimally responsive, + cachectic with mild temporal wasting, NAD    Palliative Performance Scale/Karnofsky Score:  20%  http://[facility].org/files/news/palliative_performance_scale_ppsv2.pdf    HEENT:  EOMI, anicteric, + ETT  Lungs:  sl resting tachypnea noted, lungs overall clear to auscultation, no congestion  CV: RRR,  normal S1 and S2, no murmur  GI: soft nontender, mildly distended (+ ascites), normal bowel sounds  : incontinent  + Garcia cath in place  Musculoskeletal: weakness x4 in all extremities, currently bedbound, no edema noted  Skin: no abnormal skin lesions or DU noted  Neuro: no focal deficits, + severe cognitive impairment (2/2 AMS/lethargy)  Oral intake ability: unable/mouth care only      LABS:                        8.9    8.65  )-----------( 53       ( 22 Sep 2023 03:53 )             26.0     09-22    137  |  106  |  29<H>  ----------------------------<  196<H>  3.3<L>   |  17<L>  |  2.08<H>    Ca    8.8      22 Sep 2023 03:53  Phos  2.9     09-22  Mg     2.0     09-22    TPro  6.2  /  Alb  3.7  /  TBili  1.9<H>  /  DBili  x   /  AST  8<L>  /  ALT  11  /  AlkPhos  101  09-22    Albumin: 2.0 g/dL (09.15.23 @ 12:40)  on admission      Urinalysis Basic - ( 22 Sep 2023 03:53 )    Color: x / Appearance: x / SG: x / pH: x  Gluc: 196 mg/dL / Ketone: x  / Bili: x / Urobili: x   Blood: x / Protein: x / Nitrite: x   Leuk Esterase: x / RBC: x / WBC x   Sq Epi: x / Non Sq Epi: x / Bacteria: x        RADIOLOGY & ADDITIONAL STUDIES:    ACC: [id number] EXAM:  CT CHEST IC   ORDERED BY: SHANIKA DICKSON     ACC: [id number] EXAM:  CT ABDOMEN AND PELVIS IC   ORDERED BY: SHANIKA DICKSON     PROCEDURE DATE:  09/15/2023          INTERPRETATION:  CLINICAL INFORMATION: 82-year-old man with history of   cirrhosis. Left-sided chest pain and diffuse abdominal pain    COMPARISON: CT abdomen 07/15/2023 and 02/07/2023    CONTRAST/COMPLICATIONS:  IV Contrast: IV contrast documented in unlinked concurrent exam   (accession [id number]), Omnipaque 350 (accession [id number])  90 cc   administered   10 cc discarded  Oral Contrast: NONE  Complications: None reported at time of study completion    PROCEDURE:  CT of the Chest, Abdomen and Pelvis was performed.  Sagittal and coronal reformats were performed.    FINDINGS:  CHEST:  LUNGS AND LARGE AIRWAYS: Patent central airways. Bibasilar subsegmental   atelectasis.  PLEURA: No pleural effusion.  VESSELS: Within normal limits.  HEART: Heart size is normal. No pericardial effusion. Coronary artery   calcification  MEDIASTINUM AND NATE: No lymphadenopathy.  CHEST WALL AND LOWER NECK: Within normal limits.    ABDOMEN AND PELVIS:  LIVER: Advanced cirrhosis. Low density mass exophytic from the posterior   liver dome, 3 x 2.2 x 3.3 cm contiguous to the diaphragm (2:82 and 8:95)   new since 02/07/2023. Area obscured on exam of 07/15/2023 due to apparent   motion  BILE DUCTS: Mild intrahepatic biliary ductal dilatation unchanged from   07/15/2023. Apparent calcified intraductal mass just superior to the   pancreatic head measuring 2.8 cm as previously demonstrated  GALLBLADDER: Gallstones.  SPLEEN: Within normal limits.  PANCREAS: Subcentimeter pancreatic cysts  ADRENALS: Within normal limits.  KIDNEYS/URETERS: Within normal limits.    BLADDER: Within normal limits.  REPRODUCTIVE ORGANS: Limited visualization due to artifact from hip   replacement    BOWEL: No bowel obstruction. Appendix not visualized. No evidence of   appendicitis. Mural edema ascending colon consistent with portal   colopathy. Dilated small bowel loop left upper quadrant suggesting   localized ileus.  PERITONEUM: Gross ascites. Enhancing posterior pelvic peritoneum  VESSELS: Patent portal veins and IVC. Apparent patent hepatic veins but   limited visualization. Calcification and plaque origin SMA.   Atherosclerotic change with extensive aortic plaque. Left common iliac   aneurysm 1.5 cm  RETROPERITONEUM/LYMPH NODES: No lymphadenopathy.  ABDOMINAL WALL: Umbilical hernia containing fat and fluid.  BONES: Right hip replacement. Degenerative change.    IMPRESSION:  Cirrhosis.  Exophytic mass posterior liver dome with apparent extension to the   adjacent diaphragm. Consider hepatoma. May obtain further evaluation with   MRI as clinically indicated  Gross ascites.  Patent portal and hepatic veins        --- End of Report ---      CC: [id number] EXAM:  XR ABDOMEN PORTABLE URGENT 1V   ORDERED BY: DONNIE JEFFERSON     PROCEDURE DATE:  09/20/2023          INTERPRETATION:  DATE OF EXAM: 9/20/23    COMPARISON: 9/15/23 T scan of the abdomen and pelvis    CLINICAL INDICATION: Assess for SBO.    TECHNIQUE: 1 flat abdominal film.    FINDINGS:  Enteric tube tip in distal stomach.  Mild gaseous distention of the colon.  No significant small bowel gaseous distention. No free intraperitoneal   air.  Right iliac vascular stent is noted.  Right hip replacement.    IMPRESSION:  NG tube in place.  Mild colonic ileus.  No small bowel obstruction.    --- End of Report ---    ACC: [id number] EXAM:  XR CHEST PORTABLE ROUTINE 1V   ORDERED BY: ARPIT CAMACHO     PROCEDURE DATE:  09/21/2023          INTERPRETATION:  AP semierect chest on September 21, 2023 at 10:30 AM.   Patient has sepsis.    Markedly elevated diaphragms again noted. Heart magnified by technique.    Endotracheal tube and nasogastric tube remain.    There is a developing small left base infiltrate/effusion compared to   September 19.    IMPRESSION: Small developing left base infiltrate/effusion.    --- Endof Report ---

## 2023-09-22 NOTE — CONSULT NOTE ADULT - CONVERSATION DETAILS
Face to face family meeting held with patient's wife and daughter at bedside x 35 minutes (as separately identifiable service) to discuss prognosis, ACP, goals of care, and treatment preferences.  ICU resident Dr. Tere Bowman also participated in the meeting.  Hospital and ICU course reviewed to date by Dr. Bowman.  Discussed concerns that patient's encephalopathy has minimally improved over the last several days despite being off sedation, as well as concern for worsening HRS and organ failure in the setting of patient's end stage liver disease.  Discussed guarded to poor prognosis for full functional recovery.  Wife and daughter recounted how patient recovered from chronic respiratory failure 2/2 Guillain [place] syndrome 27 years ago (including need for tracheostomy); Dr. Bowman and I counseled them that patient is in a much different place clinically and physiologically, with multiple conditions that portend a much poorer prognosis.  Nevertheless, family remains hopeful that with additional time, patient can still make a full recovery.      Discussion then turned to advance directives.  Discussed that patient remains at high risk for further (and sudden) deterioration resulting in cardiac arrest; reviewed risks/benefits/burdens of allowing natural death vs cardiac resuscitation.  Also discussed that patient remains at risk of failure to wean and requiring long term ventilatory support, which would require repeat tracheostomy and long-term PEG tube.  Family is clear that for the moment they wish for patient to remain Full Code.  Patient's wife in particular is not ready to make a decision about DNR at this time, although she and daughter voiced being open for future discussions about code status if patient's mental and respiratory status do not improve over the next several days.  Emotional support was provided.  Wife and daughter were appreciative of the conversation, and all their questions were answered.

## 2023-09-22 NOTE — PROGRESS NOTE ADULT - SUBJECTIVE AND OBJECTIVE BOX
Chief Complaint:  Patient is a 83y old  Male who presents with a chief complaint of Sepsis (21 Sep 2023 17:08)      Reason for consult: ACLF    Interval Events: Patient was seen and examined at bedside. Worsening mental status. Cr w/o improvement.     Hospital Medications:  acetaminophen     Tablet .. 650 milliGRAM(s) Oral every 6 hours PRN  albumin human 25% IVPB 100 milliLiter(s) IV Intermittent every 8 hours  chlorhexidine 0.12% Liquid 15 milliLiter(s) Oral Mucosa every 12 hours  chlorhexidine 2% Cloths 1 Application(s) Topical daily  midodrine 5 milliGRAM(s) Oral every 8 hours  octreotide  Infusion 50 MICROgram(s)/Hr IV Continuous <Continuous>  pantoprazole  Injectable 40 milliGRAM(s) IV Push daily  piperacillin/tazobactam IVPB.. 3.375 Gram(s) IV Intermittent every 8 hours  sodium chloride 0.9% lock flush 10 milliLiter(s) IV Push every 1 hour PRN      ROS:   Unable to obtain due to medical condition.     PHYSICAL EXAM:   Vital Signs:  Vital Signs Last 24 Hrs  T(C): 36.1 (22 Sep 2023 09:00), Max: 37.6 (21 Sep 2023 13:00)  T(F): 97 (22 Sep 2023 09:00), Max: 99.7 (21 Sep 2023 13:00)  HR: 71 (22 Sep 2023 09:00) (63 - 110)  BP: 115/77 (22 Sep 2023 09:00) (84/59 - 121/66)  BP(mean): 90 (22 Sep 2023 09:00) (69 - 94)  RR: 23 (22 Sep 2023 09:00) (17 - 27)  SpO2: 100% (22 Sep 2023 09:00) (96% - 100%)    Parameters below as of 22 Sep 2023 07:00  Patient On (Oxygen Delivery Method): ventilator    O2 Concentration (%): 35  Daily     Daily     GENERAL: Ill appearing  NEURO: opens eyes, not following command  HEENT: icteric sclera  CHEST: intubated, on vent  CARDIAC: regular rate, rhythm  ABDOMEN: soft, non-tender, mildly distended, no rebound or guarding BS+  EXTREMITIES: edematous extremities, especially LUE  SKIN: ecchymoses    LABS: reviewed                        8.9    8.65  )-----------( 53       ( 22 Sep 2023 03:53 )             26.0     09-22    137  |  106  |  29<H>  ----------------------------<  196<H>  3.3<L>   |  17<L>  |  2.08<H>    Ca    8.8      22 Sep 2023 03:53  Phos  2.9     09-22  Mg     2.0     09-22    TPro  6.2  /  Alb  3.7  /  TBili  1.9<H>  /  DBili  x   /  AST  8<L>  /  ALT  11  /  AlkPhos  101  09-22    LIVER FUNCTIONS - ( 22 Sep 2023 03:53 )  Alb: 3.7 g/dL / Pro: 6.2 g/dL / ALK PHOS: 101 U/L / ALT: 11 U/L DA / AST: 8 U/L / GGT: x             Interval Diagnostic Studies: see sunrise for full report

## 2023-09-22 NOTE — PROGRESS NOTE ADULT - ASSESSMENT
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 As per wifehe has no  cough, runny nose. Patient has been having black stools for the past 2 days. Pt noted to have increased secretions, tachycardia, tachypnea and AHRF with increased WOB. Admitted to ICU for AHRF requiring intubation for airway protection.   # Acute Hypoxic Resp failure   # AMS / Encephalopathy   # Hypotension   # KAY on CKD with possibility for Hepatorenal syndrome   # Alcohol inappropriate use   # Liver cirrhosis   # SBP  # Sepsis       _________CNS___________  Pt a&ox3 at baseline  currently intubated, sedated  Will hold off on speech and swallow eval while pt is intubated  currently off sedation  AAOX0, unable to follow commands, not responding to verbal stimulus  f/u CTH ()  palliative consulted ()    _________CVS___________  #Sepsis   secondary to SBP (confirmed on 9/15 with Diagnostic Para > 9000 Neutrophils)  s/p 2L NS in ED   Tachycardic   Peritoneal cultures negative, BCx negative (contaminated x 1)  WBC 12.03 > 9.86 > 8.11 > 8.9  ()    _________ RESP__________  #AHRF   CT A/P/C: basilar atelectasis,   started on Zosyn on admission  Increased work of breathing, tachy  sating 92% on 10L o2  increased secretions coughing  Intubated on vent  AB.34/29/152/16/100 ()  Vent: 400/16/35/5     __________GI____________  #Alcoholic Cirrhosis  multiple hospitalization for encephalopathy    on spironolactone and lactulose  hypotensive likely due to splanchnic vasodilation, consider albumin infusion    MELD 18   INR: 1.8   CTA/P showing ascites   will hold spironolactone for now  c/w lactulose to have 2-3 bowel movements. May titrate down dose   ammonia level- 18, f/u repeat in AM ()  s/p diagnostic paracentesis 9/15- confirmed SBP  c/w octreotide and midodrine for HRS ()    #SBP  confirmed- s/p diagnostic para in ED on 9/15 with > 9000 neutrophils   peritoneal fluid culture negative  albumin x 1 ()  c/w albumin 100 q8         ________ RENAL__________  #KAY  1.7   baseline normal from 2023  avoid nephrotoxic meds  Cr 1.57 > 1.82 > 1.93 > 1.94 > 2.02 > 2.08 ()  Urine Na 12, Urine Cr 45, FeNA 0.4%  bladder pressure 8  Nephro Dr. Oshea consulted  started octreotide and midodrine for HRS ()    _________MSK___________  Generalized weakness  No focal deficit     __________ID____________  c/w Zosyn  f/u ID recs   Gram pos clusters on one set of blood cultures  peritoneal cultures negative    _________ENDO__________  #Hypoglycemic  s/p 2 amps D50  monitor BG    _______HEME/ONC_______  #Anemia  monitor HBG dropped by 2  Hb: 10 > 11.2 > 11.4 > 10.1 > 9.6 > 8.9 ()    _________SKIN____________  Multiple area of echymosis on UE  fragile skin    ________Prophylaxis_______  #DVT scd  #GI     ________GOC/ DISPO_______  ICU

## 2023-09-22 NOTE — CONSULT NOTE ADULT - ASSESSMENT
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 Originally presented to [facility] ED on 9/15 complaining of generalized abdominal pain x 2 days associated with generalized fatigue and decreased appetite; also reported midsternal chest pain and having black stools for the past 2 days, along with dark-colored urine.     Patient was originally admitted to floor for sepsis 2/2 presumed by SBP and decompensated cirrhosis.  Started on IV Zosyn; diagnostic paracentesis performed on 9/15 confirmed SBP (cultures negative, but ascites fluid turbid with > 9000 neutrophils).  On 9/16 patient developed AHRF with worsening tachypnea, secretions, and IWOB; patient intubated and transferred to ICU, temporarily required pressors, remains on midodrine for BP support.  Patient off sedation since 9/18, but remains intubated, lethargic, minimally responsive with nonpurposeful movements.  ICU course also complicated by worsening KAY on CKD with concern for possible hepatorenal syndrome, Hepatology consulted, now  on IV Octreotide infusion.  Palliative Care consult requested for complex medical decision in context of ESLD with persistent respiratory failure and encephalopathy.

## 2023-09-22 NOTE — PROGRESS NOTE ADULT - ASSESSMENT
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  Hepatology consulted for ACLF and been following since 9/18.   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 KAY on CKD  Liver lesion  Biliary dilation  MELD 3.0 24- > 26    KAY vs. KAY on CKD  - Got 25%  albumin on 9/16 400 ml and 9/18 250 ml.  and was started on 1g/bwkg/day albumin on 9/19/23  - Has not responded to 25% albumin, Cr remained ~2  --- Monitor I/O  --- C/w supportive measures per ICU, keep MAP > 65 mmHg  --- C/w 25 % albumin   --- Since no response, agree w/ HRS mgmt., c/w octreotide (usually 100 mcg sc. tid and increase to 200 mcg tid if no response), and midodrine (and keep MAP > 65mmHg)  --- Repeat urine studies (UA, UCr, Kathia)  --- Pending nephrology consult  --- Avoid NSAIDs  --- Hold off with diuretics  --- Notably, terlipressin available in select cases at [facility], to d/w nephrology    SBP  - BCx 9/15 one set neg, one set w/ Staph. capitis  - Ascites - rare Step. constellatus  --- C/w broad spectrum antibiotic coverage per ID  --- Consider repeat Dx paracentesis - Was d/w ICU, did not have suitable pocket.    Liver lesion  - AFP, CEA, CA 19-9 normal  --- Once acute issues resolved, will need MRI abd w/wo for better characterization, given the other intraductal mass, likely will also need tissue sampling and metastatic workup (all once acute issues resolved)      Anemia  - No reported bleeding, monitor for bleeding  --- Keep Hb >7, and if bleeding PLT >50, fibrinogen > 120  --- On GRN coverage, and PPI already, plus on octreotide b/o suspected HRS    Altered mental status   - Ammonia < 10.  --- Consider CT head, neurology consult   --- C/w lactulose and titrate to have 2-3 BM/day.      Poor prognosis - Consider GOC discussion    Thank you for consult  Will continue to monitor. Hepatology returns Monday. Please, consult GI on call if change in status, questions, concerns.   D/w ICU

## 2023-09-22 NOTE — PROGRESS NOTE ADULT - ATTENDING COMMENTS
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 As per wifehe has no  cough, runny nose. Patient has been having black stools for the past 2 days  with dark-colored urine. He also endorse chest pain, midsternal  Denies any hematemesis, fevers, shortness of breath, N/V/D.    On 9/16 ICU consulted for increased secretions, tachycardia, tachypnea, at risk for airway compromise with acute hypoxic resp failure using accessory muscle, intubated for WOB.      ASSESSMENT   - Acute Hypoxic Resp failure   - Acute encephalopathy   - Hypotension   - KAY on CKD with possibility for Hepatorenal syndrome   - Liver cirrhosis   - SBP  - Sepsis       Plan   - SAT/SBT as tolerated daily  - Monitor neurologic status, given no significant improvement in mentation, obtain CT head   - Continue vent support , adjust as per ABG   - Hemodynamic support   - Titrate vaso-pressors to maintain MAP>65 as needed --> off pressors overnight  - IV antibx to cover GNR in SBP  - F/U cultures and peritoneal fluid --> BCx NGTD x 4d  - Hepatology recs appreciated, midorine and octreotide for possible HRS  - Nephrology c/s  - No response to albumin   - Lactulose rectally   - KUB with mild colonic ileus, now passing stool  - POCUS, monitor UOP closely  - EGD in June without esophageal varices   - No obvious bleeding at this time  - NGT in place TF resumed  - DVT PI prophy  - Cont. ICU care.

## 2023-09-22 NOTE — CONSULT NOTE ADULT - ASSESSMENT
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 Pt a/w septic shock 2/2 SBP/ Bacteremia & decompensated liver cirrhosis. Nephrology consulted for Elevated serum creatinine.  s/p CT abd/ pelvis with IV contrast 9/15 with liver mass, and intraductal mass superior to pancreatic head    1. KAY- shaylee SCr 0.7-1. KAY in the setting of cirrhosis; HRS vs ATN vs LEA (s/p CT with IV contrast on 9/15) with hypotension. UA trace protein, large blood and mod LE. FeNa 0.35% s/p albumin x 2 days (9/19-9/21) with no renal improvement (less likely pre-renal).    Pt now on tx for HRS with midodrine 5mg PO q8hrs, Octreotide gtt 50mcg/hr and albumin 25% in 100ml q 8hrs x 2 days  Repeat urine lytes. Check post void bladder scan. c/w current tx. Strict I/Os. Avoid nephrotoxins/ NSAIDs/ RCA. Monitor BMP.    2. Septic shock- due to spontaneous bacterial peritonitis and Staph capitis bacteremia. Pt on Zosyn. Plan as per ICU/ ID.     3. Hypotension- BP improved. Pt off IV pressors. Currently on Midodrine 5mg PO tid. Monitor BP    4.       [facility] NEPHROLOGY  Aubrey Nguyen M.D.  Tod Brewer D.O.  Thalia Oshea M.D.  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 Pt a/w septic shock 2/2 SBP/ Bacteremia & decompensated liver cirrhosis. Nephrology consulted for Elevated serum creatinine.  s/p CT abd/ pelvis with IV contrast 9/15 with liver mass, and intraductal mass superior to pancreatic head    1. KAY- shaylee SCr 0.7-1. KAY in the setting of cirrhosis; HRS vs ATN vs LEA (s/p CT with IV contrast on 9/15) with hypotension. UA trace protein, large blood and mod LE. FeNa 0.35% s/p albumin x 2 days (9/19-9/21) with no renal improvement (less likely pre-renal).    Pt now on tx for HRS with midodrine 5mg PO q8hrs, Octreotide gtt 50mcg/hr and albumin 25% in 100ml q 8hrs x 2 days  Repeat urine lytes. Renal US with no hydro. c/w current tx. Strict I/Os. Avoid nephrotoxins/ NSAIDs/ RCA. Monitor BMP.    2. Septic shock- due to spontaneous bacterial peritonitis and Staph capitis bacteremia. Pt on Zosyn. Plan as per ICU/ ID.     3. Hypotension- BP improved. Pt off IV pressors. Currently on Midodrine 5mg PO tid. Monitor BP    4. Cirrhosis - with liver mass. Plan as per Hepatology      [facility] NEPHROLOGY  Aubrey Nguyen M.D.  Tod Brewer D.O.  Thalia Oshea M.D.  MD Charlene Monae, MSN, ANP-C    Telephone: [phone]  Facsimile: [phone] [place] [address], #[facility]  [place] [address]

## 2023-09-22 NOTE — CONSULT NOTE ADULT - CONSULT REASON
Complex medical decision making in the context of end stage cirrhosis, sepsis, AHRF. and persistent encephalopathy

## 2023-09-22 NOTE — CONSULT NOTE ADULT - PROBLEM SELECTOR RECOMMENDATION 9
In setting of sepsis 2/2 SBP  Remains intubated and poorly responsive off sedation    Continue management as per ICU team

## 2023-09-22 NOTE — PROGRESS NOTE ADULT - GASTROINTESTINAL
soft/nondistended/no guarding/no rigidity/tender/bowel sounds hypoactive
soft/normal active bowel sounds/no guarding/no rigidity/tender/distended/ascites

## 2023-09-22 NOTE — CONSULT NOTE ADULT - SUBJECTIVE AND OBJECTIVE BOX
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 Pt a/w septic shock 2/2 SBP/ Bacteremia & decompensated liver cirrhosis. Nephrology consulted for Elevated serum creatinine.  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      Liver cirrhosis  alcoholic/WALL cirrhosis c/b variceal bleed s/p banding (8/2018) and hepatic encephalopathy (several years ago)      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)      Pancreatitis  2/2 choledocholithiasis s/p ERCP with stone extraction and plastic stent placement (11/2019, stent now removed)      Hematochezia  2/2 colonic AVMs s/p cauterization and hemorrhoids (11/2019)      Melena  2/2 duodenal ulcers s/p argon plasma coagulation (4/2020)      Chronic kidney disease (CKD)      GERD (gastroesophageal reflux disease)      Hypothyroidism      H/O inguinal hernia repair      History of repair of hip fracture  R hip      History of hip replacement  10/2020      H/O spinal stenosis        No Known Allergies    Home Medications Reviewed  Hospital Medications:   MEDICATIONS  (STANDING):  albumin human 25% IVPB 100 milliLiter(s) IV Intermittent every 8 hours  chlorhexidine 0.12% Liquid 15 milliLiter(s) Oral Mucosa every 12 hours  chlorhexidine 2% Cloths 1 Application(s) Topical daily  midodrine 5 milliGRAM(s) Oral every 8 hours  octreotide  Infusion 50 MICROgram(s)/Hr (10 mL/Hr) IV Continuous <Continuous>  pantoprazole  Injectable 40 milliGRAM(s) IV Push daily  piperacillin/tazobactam IVPB.. 3.375 Gram(s) IV Intermittent every 8 hours      FAMILY HISTORY:  Family history of ovarian cancer (Sibling)        REVIEW OF SYSTEMS: Unable to obtain; pt intubated    VITALS:  T(F): 98.1 (09-22-23 @ 19:00), Max: 98.1 (09-22-23 @ 19:00)  HR: 73 (09-22-23 @ 19:00)  BP: 124/70 (09-22-23 @ 19:00)  RR: 24 (09-22-23 @ 19:00)  SpO2: 100% (09-22-23 @ 19:00)  Wt(kg): --    09-21 @ 07:01  -  09-22 @ 07:00  --------------------------------------------------------  IN: 2610 mL / OUT: 310 mL / NET: 2300 mL    09-22 @ 07:01  -  09-22 @ 19:47  --------------------------------------------------------  IN: 910 mL / OUT: 944 mL / NET: -34 mL        PHYSICAL EXAM:  Gen: NAD,   HEENT: +ETT, +NGT  Neck: no JVD  Cards: RRR, +S1/S2, no M/G/R  Resp: +mechanical BS  GI: +abd distention, NT  : +rodriguez  Extremities: +1 LE edema B/L  Derm: no rashes  Neuro: non-focal    LABS:  09-22    137  |  106  |  29<H>  ----------------------------<  196<H>  3.3<L>   |  17<L>  |  2.08<H>    Ca    8.8      22 Sep 2023 03:53  Phos  2.9     09-22  Mg     2.0     09-22    TPro  6.2  /  Alb  3.7  /  TBili  1.9<H>  /  DBili      /  AST  8<L>  /  ALT  11  /  AlkPhos  101  09-22    Creatinine Trend: 2.08 <--, 2.02 <--, 1.94 <--, 1.93 <--, 1.94 <--, 1.82 <--, 1.57 <--, 1.51 <--, 1.76 <--, 1.68 <--, 1.05 <--, 1.55 <--, 1.31 <--                        8.9    8.65  )-----------( 53       ( 22 Sep 2023 03:53 )             26.0     Urine Studies:  Urinalysis Basic - ( 22 Sep 2023 03:53 )    Color:  / Appearance:  / SG:  / pH:   Gluc: 196 mg/dL / Ketone:   / Bili:  / Urobili:    Blood:  / Protein:  / Nitrite:    Leuk Esterase:  / RBC:  / WBC    Sq Epi:  / Non Sq Epi:  / Bacteria:       Sodium, Random Urine: 12 mmol/L (09-18 @ 10:50)  Creatinine, Random Urine: 45 mg/dL (09-18 @ 10:50)    RADIOLOGY & ADDITIONAL STUDIES:      < from: CT Chest w/ IV Cont (09.15.23 @ 15:59) >    ACC: [id number] EXAM:  CT CHEST IC   ORDERED BY: SHANIKA DICKSON     ACC: [id number] EXAM:  CT ABDOMEN AND PELVIS IC   ORDERED BY: SHANIKA DICKSON     PROCEDURE DATE:  09/15/2023      < end of copied text >  < from: CT Chest w/ IV Cont (09.15.23 @ 15:59) >  ABDOMEN AND PELVIS:  LIVER: Advanced cirrhosis. Low density mass exophytic from the posterior   liver dome, 3 x 2.2 x 3.3 cm contiguous to the diaphragm (2:82 and 8:95)   new since 02/07/2023. Area obscured on exam of 07/15/2023 due to apparent   motion  BILE DUCTS: Mild intrahepatic biliary ductal dilatation unchanged from   07/15/2023. Apparent calcified intraductal mass just superior to the   pancreatic head measuring 2.8 cm as previously demonstrated    < end of copied text >  < from: CT Chest w/ IV Cont (09.15.23 @ 15:59) >  KIDNEYS/URETERS: Within normal limits.    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 Pt a/w septic shock 2/2 SBP/ Bacteremia & decompensated liver cirrhosis. Nephrology consulted for Elevated serum creatinine.  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      Liver cirrhosis  alcoholic/WALL cirrhosis c/b variceal bleed s/p banding (8/2018) and hepatic encephalopathy (several years ago)      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)      Pancreatitis  2/2 choledocholithiasis s/p ERCP with stone extraction and plastic stent placement (11/2019, stent now removed)      Hematochezia  2/2 colonic AVMs s/p cauterization and hemorrhoids (11/2019)      Melena  2/2 duodenal ulcers s/p argon plasma coagulation (4/2020)      Chronic kidney disease (CKD)      GERD (gastroesophageal reflux disease)      Hypothyroidism      H/O inguinal hernia repair      History of repair of hip fracture  R hip      History of hip replacement  10/2020      H/O spinal stenosis        No Known Allergies    Home Medications Reviewed  Hospital Medications:   MEDICATIONS  (STANDING):  albumin human 25% IVPB 100 milliLiter(s) IV Intermittent every 8 hours  chlorhexidine 0.12% Liquid 15 milliLiter(s) Oral Mucosa every 12 hours  chlorhexidine 2% Cloths 1 Application(s) Topical daily  midodrine 5 milliGRAM(s) Oral every 8 hours  octreotide  Infusion 50 MICROgram(s)/Hr (10 mL/Hr) IV Continuous <Continuous>  pantoprazole  Injectable 40 milliGRAM(s) IV Push daily  piperacillin/tazobactam IVPB.. 3.375 Gram(s) IV Intermittent every 8 hours      FAMILY HISTORY:  Family history of ovarian cancer (Sibling)        REVIEW OF SYSTEMS: Unable to obtain; pt intubated    VITALS:  T(F): 98.1 (09-22-23 @ 19:00), Max: 98.1 (09-22-23 @ 19:00)  HR: 73 (09-22-23 @ 19:00)  BP: 124/70 (09-22-23 @ 19:00)  RR: 24 (09-22-23 @ 19:00)  SpO2: 100% (09-22-23 @ 19:00)  Wt(kg): --    09-21 @ 07:01  -  09-22 @ 07:00  --------------------------------------------------------  IN: 2610 mL / OUT: 310 mL / NET: 2300 mL    09-22 @ 07:01  -  09-22 @ 19:47  --------------------------------------------------------  IN: 910 mL / OUT: 944 mL / NET: -34 mL        PHYSICAL EXAM:  Gen: NAD,   HEENT: +ETT, +NGT  Neck: no JVD  Cards: RRR, +S1/S2, no M/G/R  Resp: +mechanical BS  GI: +abd distention, NT  : +rodriguez  Extremities: +1 LE edema B/L  Derm: no rashes  Neuro: non-focal    LABS:  09-22    137  |  106  |  29<H>  ----------------------------<  196<H>  3.3<L>   |  17<L>  |  2.08<H>    Ca    8.8      22 Sep 2023 03:53  Phos  2.9     09-22  Mg     2.0     09-22    TPro  6.2  /  Alb  3.7  /  TBili  1.9<H>  /  DBili      /  AST  8<L>  /  ALT  11  /  AlkPhos  101  09-22    Creatinine Trend: 2.08 <--, 2.02 <--, 1.94 <--, 1.93 <--, 1.94 <--, 1.82 <--, 1.57 <--, 1.51 <--, 1.76 <--, 1.68 <--, 1.05 <--, 1.55 <--, 1.31 <--                        8.9    8.65  )-----------( 53       ( 22 Sep 2023 03:53 )             26.0     Urine Studies:  Urinalysis Basic - ( 22 Sep 2023 03:53 )    Color:  / Appearance:  / SG:  / pH:   Gluc: 196 mg/dL / Ketone:   / Bili:  / Urobili:    Blood:  / Protein:  / Nitrite:    Leuk Esterase:  / RBC:  / WBC    Sq Epi:  / Non Sq Epi:  / Bacteria:       Sodium, Random Urine: 12 mmol/L (09-18 @ 10:50)  Creatinine, Random Urine: 45 mg/dL (09-18 @ 10:50)    RADIOLOGY & ADDITIONAL STUDIES:      < from: CT Chest w/ IV Cont (09.15.23 @ 15:59) >    ACC: [id number] EXAM:  CT CHEST IC   ORDERED BY: SHANIKA DICKSON     ACC: [id number] EXAM:  CT ABDOMEN AND PELVIS IC   ORDERED BY: SHANIKA DICKSON     PROCEDURE DATE:  09/15/2023      < end of copied text >  < from: CT Chest w/ IV Cont (09.15.23 @ 15:59) >  ABDOMEN AND PELVIS:  LIVER: Advanced cirrhosis. Low density mass exophytic from the posterior   liver dome, 3 x 2.2 x 3.3 cm contiguous to the diaphragm (2:82 and 8:95)   new since 02/07/2023. Area obscured on exam of 07/15/2023 due to apparent   motion  BILE DUCTS: Mild intrahepatic biliary ductal dilatation unchanged from   07/15/2023. Apparent calcified intraductal mass just superior to the   pancreatic head measuring 2.8 cm as previously demonstrated    < end of copied text >  < from: CT Chest w/ IV Cont (09.15.23 @ 15:59) >  KIDNEYS/URETERS: Within normal limits.    < end of copied text >    < from: US Renal (09.22.23 @ 14:27) >    ACC: [id number] EXAM:  US KIDNEY(S)   ORDERED BY: DONNIE JEFFERSON     PROCEDURE DATE:  09/22/2023      < end of copied text >  < from: US Renal (09.22.23 @ 14:27) >  FINDINGS: Bilateral renal parenchymal atrophy with renal parenchymal   thinning.    Right kidney: 8.9 cm. No renal mass, hydronephrosis or calculi.    Left kidney: 7.0 cm. No renal mass, hydronephrosis or calculi.    Urinary bladder: Not visualized, likely not distended.    Moderate ascites noted.    IMPRESSION:  Bilateral renal parenchymal atrophy with renal parenchymal thinning. No   evidence for hydronephrosis. Ascites.    --- End of Report ---      < end of copied text >

## 2023-09-22 NOTE — PROGRESS NOTE ADULT - ASSESSMENT
Septic shock - resolved  Spontaneous Bacterial Peritonitis  Leukocytosis - normalized  Bacteremia - Contaminant      Plan - Cont Zosyn 3.375gms iv q8hrs x 2days more only  repeat blood cultures when feasible.  Time spent - 31 mins  Reconsult prn.

## 2023-09-22 NOTE — PROGRESS NOTE ADULT - RESPIRATORY
clear to auscultation bilaterally/no wheezes/no rales/no rhonchi/airway patent/breath sounds equal/good air movement
clear to auscultation bilaterally/no wheezes/no rales/no rhonchi/airway patent/breath sounds equal

## 2023-09-22 NOTE — CONSULT NOTE ADULT - PROBLEM SELECTOR RECOMMENDATION 6
As above.  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  Patient is hospice appropriate with likely prognosis of 6 months or less    See GOC discussion above--patient to remain Full Code, family wishes to continue with all aggressive treatments at this time.    Continue overall management per Nephrology, Hepatology, and ICU team  Overall prognosis remains poor  Palliative care team will continue to follow.

## 2023-09-22 NOTE — PROGRESS NOTE ADULT - SUBJECTIVE AND OBJECTIVE BOX
INTERVAL HPI/OVERNIGHT EVENTS: ***    PRESSORS: [ ] YES [ ] NO  WHICH:    Antimicrobial:  piperacillin/tazobactam IVPB.. 3.375 Gram(s) IV Intermittent every 8 hours    Cardiovascular:  midodrine 5 milliGRAM(s) Oral every 8 hours    Pulmonary:    Hematalogic:    Other:  acetaminophen     Tablet .. 650 milliGRAM(s) Oral every 6 hours PRN  albumin human 25% IVPB 100 milliLiter(s) IV Intermittent every 8 hours  chlorhexidine 0.12% Liquid 15 milliLiter(s) Oral Mucosa every 12 hours  chlorhexidine 2% Cloths 1 Application(s) Topical daily  octreotide  Infusion 50 MICROgram(s)/Hr IV Continuous <Continuous>  pantoprazole  Injectable 40 milliGRAM(s) IV Push daily  sodium chloride 0.9% lock flush 10 milliLiter(s) IV Push every 1 hour PRN    acetaminophen     Tablet .. 650 milliGRAM(s) Oral every 6 hours PRN  albumin human 25% IVPB 100 milliLiter(s) IV Intermittent every 8 hours  chlorhexidine 0.12% Liquid 15 milliLiter(s) Oral Mucosa every 12 hours  chlorhexidine 2% Cloths 1 Application(s) Topical daily  midodrine 5 milliGRAM(s) Oral every 8 hours  octreotide  Infusion 50 MICROgram(s)/Hr IV Continuous <Continuous>  pantoprazole  Injectable 40 milliGRAM(s) IV Push daily  piperacillin/tazobactam IVPB.. 3.375 Gram(s) IV Intermittent every 8 hours  sodium chloride 0.9% lock flush 10 milliLiter(s) IV Push every 1 hour PRN    Drug Dosing Weight  Height (cm): 170.2 (15 Sep 2023 11:37)  Weight (kg): 64.7 (16 Sep 2023 16:30)  BMI (kg/m2): 22.3 (16 Sep 2023 16:30)  BSA (m2): 1.75 (16 Sep 2023 16:30)    CENTRAL LINE: [ ] YES [ ] NO  LOCATION:   DATE INSERTED:  REMOVE: [ ] YES [ ] NO  EXPLAIN:    REYNOLDS: [ ] YES [ ] NO    DATE INSERTED:  REMOVE:  [ ] YES [ ] NO  EXPLAIN:    A-LINE:  [ ] YES [ ] NO  LOCATION:   DATE INSERTED:  REMOVE:  [ ] YES [ ] NO  EXPLAIN:    PMH -reviewed admission note, no change since admission  PAST MEDICAL & SURGICAL HISTORY:  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      Liver cirrhosis  alcoholic/WALL cirrhosis c/b variceal bleed s/p banding (8/2018) and hepatic encephalopathy (several years ago)      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)      Pancreatitis  2/2 choledocholithiasis s/p ERCP with stone extraction and plastic stent placement (11/2019, stent now removed)      Hematochezia  2/2 colonic AVMs s/p cauterization and hemorrhoids (11/2019)      Melena  2/2 duodenal ulcers s/p argon plasma coagulation (4/2020)      Chronic kidney disease (CKD)      GERD (gastroesophageal reflux disease)      Hypothyroidism      H/O inguinal hernia repair      History of repair of hip fracture  R hip      History of hip replacement  10/2020      H/O spinal stenosis          ICU Vital Signs Last 24 Hrs  T(C): 36.1 (22 Sep 2023 09:00), Max: 37.6 (21 Sep 2023 13:00)  T(F): 97 (22 Sep 2023 09:00), Max: 99.7 (21 Sep 2023 13:00)  HR: 71 (22 Sep 2023 09:00) (63 - 110)  BP: 115/77 (22 Sep 2023 09:00) (84/59 - 121/66)  BP(mean): 90 (22 Sep 2023 09:00) (69 - 94)  ABP: --  ABP(mean): --  RR: 23 (22 Sep 2023 09:00) (17 - 27)  SpO2: 100% (22 Sep 2023 09:00) (96% - 100%)    O2 Parameters below as of 22 Sep 2023 07:00  Patient On (Oxygen Delivery Method): ventilator    O2 Concentration (%): 35        ABG - ( 21 Sep 2023 03:29 )  pH, Arterial: 7.34  pH, Blood: x     /  pCO2: 29    /  pO2: 152   / HCO3: 16    / Base Excess: -8.8  /  SaO2: 100                   09-21 @ 07:01  -  09-22 @ 07:00  --------------------------------------------------------  IN: 2555 mL / OUT: 305 mL / NET: 2250 mL        Mode: AC/ CMV (Assist Control/ Continuous Mandatory Ventilation)  RR (machine): 16  TV (machine): 400  FiO2: 35  PEEP: 5  ITime: 0.9  MAP: 13  PIP: 28      PHYSICAL EXAM:    GENERAL: NAD, well-groomed, well-developed  HEAD:  Atraumatic, Normocephalic  EYES: EOMI, PERRLA, conjunctiva and sclera clear  ENMT: No tonsillar erythema, exudates, or enlargement; Moist mucous membranes, Good dentition, No lesions  NECK: Supple, normal appearance, No JVD; Normal thyroid; Trachea midline  NERVOUS SYSTEM:  Alert & Oriented X3,  Motor Strength 5/5 B/L upper and lower extremities; DTRs 2+ intact and symmetric  CHEST/LUNG: No chest deformity; Normal percussion bilaterally; No rales, rhonchi, wheezing   HEART: Regular rate and rhythm; No murmurs, rubs, or gallops  ABDOMEN: Soft, Nontender, Nondistended; Bowel sounds present  EXTREMITIES:  2+ Peripheral Pulses, No clubbing, cyanosis, or edema  LYMPH: No lymphadenopathy noted  SKIN: No rashes or lesions;  Good capillary refill      LABS:  CBC Full  -  ( 22 Sep 2023 03:53 )  WBC Count : 8.65 K/uL  RBC Count : 3.45 M/uL  Hemoglobin : 8.9 g/dL  Hematocrit : 26.0 %  Platelet Count - Automated : 53 K/uL  Mean Cell Volume : 75.4 fl  Mean Cell Hemoglobin : 25.8 pg  Mean Cell Hemoglobin Concentration : 34.2 gm/dL  Auto Neutrophil # : x  Auto Lymphocyte # : x  Auto Monocyte # : x  Auto Eosinophil # : x  Auto Basophil # : x  Auto Neutrophil % : x  Auto Lymphocyte % : x  Auto Monocyte % : x  Auto Eosinophil % : x  Auto Basophil % : x    09-22    137  |  106  |  29<H>  ----------------------------<  196<H>  3.3<L>   |  17<L>  |  2.08<H>    Ca    8.8      22 Sep 2023 03:53  Phos  2.9     09-22  Mg     2.0     09-22    TPro  6.2  /  Alb  3.7  /  TBili  1.9<H>  /  DBili  x   /  AST  8<L>  /  ALT  11  /  AlkPhos  101  09-22      Urinalysis Basic - ( 22 Sep 2023 03:53 )    Color: x / Appearance: x / SG: x / pH: x  Gluc: 196 mg/dL / Ketone: x  / Bili: x / Urobili: x   Blood: x / Protein: x / Nitrite: x   Leuk Esterase: x / RBC: x / WBC x   Sq Epi: x / Non Sq Epi: x / Bacteria: x          RADIOLOGY & ADDITIONAL STUDIES REVIEWED:  ***    [ ]GOALS OF CARE DISCUSSION WITH PATIENT/FAMILY/PROXY:    CRITICAL CARE TIME SPENT: 35 minutes INTERVAL HPI/OVERNIGHT EVENTS: No acute overnight events. Pt seen at bedside, NAD, off sedation, awake, but not alert or following commands.     PRESSORS: [ ] YES [x] NO  WHICH:    Antimicrobial:  piperacillin/tazobactam IVPB.. 3.375 Gram(s) IV Intermittent every 8 hours    Cardiovascular:  midodrine 5 milliGRAM(s) Oral every 8 hours    Pulmonary:    Hematalogic:    Other:  acetaminophen     Tablet .. 650 milliGRAM(s) Oral every 6 hours PRN  albumin human 25% IVPB 100 milliLiter(s) IV Intermittent every 8 hours  chlorhexidine 0.12% Liquid 15 milliLiter(s) Oral Mucosa every 12 hours  chlorhexidine 2% Cloths 1 Application(s) Topical daily  octreotide  Infusion 50 MICROgram(s)/Hr IV Continuous <Continuous>  pantoprazole  Injectable 40 milliGRAM(s) IV Push daily  sodium chloride 0.9% lock flush 10 milliLiter(s) IV Push every 1 hour PRN    acetaminophen     Tablet .. 650 milliGRAM(s) Oral every 6 hours PRN  albumin human 25% IVPB 100 milliLiter(s) IV Intermittent every 8 hours  chlorhexidine 0.12% Liquid 15 milliLiter(s) Oral Mucosa every 12 hours  chlorhexidine 2% Cloths 1 Application(s) Topical daily  midodrine 5 milliGRAM(s) Oral every 8 hours  octreotide  Infusion 50 MICROgram(s)/Hr IV Continuous <Continuous>  pantoprazole  Injectable 40 milliGRAM(s) IV Push daily  piperacillin/tazobactam IVPB.. 3.375 Gram(s) IV Intermittent every 8 hours  sodium chloride 0.9% lock flush 10 milliLiter(s) IV Push every 1 hour PRN    Drug Dosing Weight  Height (cm): 170.2 (15 Sep 2023 11:37)  Weight (kg): 64.7 (16 Sep 2023 16:30)  BMI (kg/m2): 22.3 (16 Sep 2023 16:30)  BSA (m2): 1.75 (16 Sep 2023 16:30)    CENTRAL LINE: [ ] YES [ ] NO  LOCATION:   DATE INSERTED:  REMOVE: [ ] YES [ ] NO  EXPLAIN:    REYNOLDS: [ ] YES [ ] NO    DATE INSERTED:  REMOVE:  [ ] YES [ ] NO  EXPLAIN:    A-LINE:  [ ] YES [ ] NO  LOCATION:   DATE INSERTED:  REMOVE:  [ ] YES [ ] NO  EXPLAIN:    PMH -reviewed admission note, no change since admission  PAST MEDICAL & SURGICAL HISTORY:  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      Liver cirrhosis  alcoholic/WALL cirrhosis c/b variceal bleed s/p banding (8/2018) and hepatic encephalopathy (several years ago)      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)      Pancreatitis  2/2 choledocholithiasis s/p ERCP with stone extraction and plastic stent placement (11/2019, stent now removed)      Hematochezia  2/2 colonic AVMs s/p cauterization and hemorrhoids (11/2019)      Melena  2/2 duodenal ulcers s/p argon plasma coagulation (4/2020)      Chronic kidney disease (CKD)      GERD (gastroesophageal reflux disease)      Hypothyroidism      H/O inguinal hernia repair      History of repair of hip fracture  R hip      History of hip replacement  10/2020      H/O spinal stenosis          ICU Vital Signs Last 24 Hrs  T(C): 36.1 (22 Sep 2023 09:00), Max: 37.6 (21 Sep 2023 13:00)  T(F): 97 (22 Sep 2023 09:00), Max: 99.7 (21 Sep 2023 13:00)  HR: 71 (22 Sep 2023 09:00) (63 - 110)  BP: 115/77 (22 Sep 2023 09:00) (84/59 - 121/66)  BP(mean): 90 (22 Sep 2023 09:00) (69 - 94)  ABP: --  ABP(mean): --  RR: 23 (22 Sep 2023 09:00) (17 - 27)  SpO2: 100% (22 Sep 2023 09:00) (96% - 100%)    O2 Parameters below as of 22 Sep 2023 07:00  Patient On (Oxygen Delivery Method): ventilator    O2 Concentration (%): 35        ABG - ( 21 Sep 2023 03:29 )  pH, Arterial: 7.34  pH, Blood: x     /  pCO2: 29    /  pO2: 152   / HCO3: 16    / Base Excess: -8.8  /  SaO2: 100                   09-21 @ 07:01  -  09-22 @ 07:00  --------------------------------------------------------  IN: 2555 mL / OUT: 305 mL / NET: 2250 mL        Mode: AC/ CMV (Assist Control/ Continuous Mandatory Ventilation)  RR (machine): 16  TV (machine): 400  FiO2: 35  PEEP: 5  ITime: 0.9  MAP: 13  PIP: 28      PHYSICAL EXAM:    GENERAL: NAD, not responding to verbal stimulation, AAOX0, unable to follow commands  HEAD:  Atraumatic, Normocephalic  EYES: EOMI, PERRLA, conjunctiva and sclera clear  ENMT: No tonsillar erythema, exudates, or enlargement; Moist mucous membranes, Good dentition, No lesions  NECK: Supple, normal appearance, No JVD; Normal thyroid; Trachea midline  NERVOUS SYSTEM:  Alert & Oriented X0, unable to assess strength  CHEST/LUNG: No chest deformity; Normal percussion bilaterally; No rales, rhonchi, wheezing   HEART: Regular rate and rhythm; No murmurs, rubs, or gallops  ABDOMEN: Distended, soft, hypoactive bowel sounds  EXTREMITIES:  2+ Peripheral Pulses, No clubbing, cyanosis, or edema  LYMPH: No lymphadenopathy noted  SKIN: No rashes or lesions;  Good capillary refill      LABS:  CBC Full  -  ( 22 Sep 2023 03:53 )  WBC Count : 8.65 K/uL  RBC Count : 3.45 M/uL  Hemoglobin : 8.9 g/dL  Hematocrit : 26.0 %  Platelet Count - Automated : 53 K/uL  Mean Cell Volume : 75.4 fl  Mean Cell Hemoglobin : 25.8 pg  Mean Cell Hemoglobin Concentration : 34.2 gm/dL  Auto Neutrophil # : x  Auto Lymphocyte # : x  Auto Monocyte # : x  Auto Eosinophil # : x  Auto Basophil # : x  Auto Neutrophil % : x  Auto Lymphocyte % : x  Auto Monocyte % : x  Auto Eosinophil % : x  Auto Basophil % : x    09-22    137  |  106  |  29<H>  ----------------------------<  196<H>  3.3<L>   |  17<L>  |  2.08<H>    Ca    8.8      22 Sep 2023 03:53  Phos  2.9     09-22  Mg     2.0     09-22    TPro  6.2  /  Alb  3.7  /  TBili  1.9<H>  /  DBili  x   /  AST  8<L>  /  ALT  11  /  AlkPhos  101  09-22      Urinalysis Basic - ( 22 Sep 2023 03:53 )    Color: x / Appearance: x / SG: x / pH: x  Gluc: 196 mg/dL / Ketone: x  / Bili: x / Urobili: x   Blood: x / Protein: x / Nitrite: x   Leuk Esterase: x / RBC: x / WBC x   Sq Epi: x / Non Sq Epi: x / Bacteria: x          RADIOLOGY & ADDITIONAL STUDIES REVIEWED:  ***    [ ]GOALS OF CARE DISCUSSION WITH PATIENT/FAMILY/PROXY:    CRITICAL CARE TIME SPENT: 35 minutes

## 2023-09-22 NOTE — CONSULT NOTE ADULT - PROBLEM SELECTOR RECOMMENDATION 2
Likely in setting of decompensated liver cirrhosis, now exacerbated by worsening KAY on CKD and likely hepatorenal syndrome.    Hepatology and Nephrology input appreciated  Continue midodrine, NG feeds, and IV octreotide infusion  CT of head pending  Further management as per ICU team

## 2023-09-22 NOTE — PROGRESS NOTE ADULT - SUBJECTIVE AND OBJECTIVE BOX
ICU VISIT  83y Male    Meds:  piperacillin/tazobactam IVPB.. 3.375 Gram(s) IV Intermittent every 8 hours    Allergies    No Known Allergies    Intolerances        VITALS:  Vital Signs Last 24 Hrs  T(C): 36.3 (22 Sep 2023 15:00), Max: 36.7 (21 Sep 2023 19:00)  T(F): 97.3 (22 Sep 2023 15:00), Max: 98.1 (21 Sep 2023 19:00)  HR: 80 (22 Sep 2023 15:00) (63 - 84)  BP: 130/86 (22 Sep 2023 15:00) (97/59 - 130/86)  BP(mean): 100 (22 Sep 2023 15:00) (73 - 100)  RR: 25 (22 Sep 2023 15:00) (20 - 27)  SpO2: 100% (22 Sep 2023 15:00) (96% - 100%)    Parameters below as of 22 Sep 2023 07:00  Patient On (Oxygen Delivery Method): ventilator    O2 Concentration (%): 35    LABS/DIAGNOSTIC TESTS:                          8.9    8.65  )-----------( 53       ( 22 Sep 2023 03:53 )             26.0         09-22    137  |  106  |  29<H>  ----------------------------<  196<H>  3.3<L>   |  17<L>  |  2.08<H>    Ca    8.8      22 Sep 2023 03:53  Phos  2.9     09-22  Mg     2.0     09-22    TPro  6.2  /  Alb  3.7  /  TBili  1.9<H>  /  DBili  x   /  AST  8<L>  /  ALT  11  /  AlkPhos  101  09-22      LIVER FUNCTIONS - ( 22 Sep 2023 03:53 )  Alb: 3.7 g/dL / Pro: 6.2 g/dL / ALK PHOS: 101 U/L / ALT: 11 U/L DA / AST: 8 U/L / GGT: x             CULTURES: Abdominal Fl Abdominal Fluid  09-15 @ 19:50   Rare Streptococcus constellatus "Susceptibilities not performed"  --    polymorphonuclear leukocytes seen  No organisms seen  by cytocentrifuge      .Blood Blood-Peripheral  09-15 @ 14:10   Growth in aerobic bottle: Staphylococcus capitis  Coagulase Negative Staphylococci isolated from a single blood culture set  may represent contamination.  Contact the Microbiology Department at [phone] if susceptibility  testing is clinically indicated.  Direct identification is available within approximately 3-5  hours either by Blood Panel Multiplexed PCR or Direct  MALDI-TOF. Details: [email or web address]  --  Blood Culture PCR      .Blood Blood-Peripheral  09-15 @ 14:00   No growth at 5 days  --  --            RADIOLOGY:      ROS:  [  ] UNABLE TO ELICIT ICU VISIT  83y Male who remains in the ICU , he is still vent dependent on an FIO2 of 35% and PEEP of 5, he is off pressors and has been off sedation but has not been responding, his eyes are opening but has no blinking response at this time. He has no fevers but his urine output has decreased and his cr has increased. He grew out Strep Constellatus in his peritoneal cultures. He has been having diarrhea and has a rectal tube in place.    Meds:  piperacillin/tazobactam IVPB.. 3.375 Gram(s) IV Intermittent every 8 hours    Allergies    No Known Allergies    Intolerances        VITALS:  Vital Signs Last 24 Hrs  T(C): 36.3 (22 Sep 2023 15:00), Max: 36.7 (21 Sep 2023 19:00)  T(F): 97.3 (22 Sep 2023 15:00), Max: 98.1 (21 Sep 2023 19:00)  HR: 80 (22 Sep 2023 15:00) (63 - 84)  BP: 130/86 (22 Sep 2023 15:00) (97/59 - 130/86)  BP(mean): 100 (22 Sep 2023 15:00) (73 - 100)  RR: 25 (22 Sep 2023 15:00) (20 - 27)  SpO2: 100% (22 Sep 2023 15:00) (96% - 100%)    Parameters below as of 22 Sep 2023 07:00  Patient On (Oxygen Delivery Method): ventilator    O2 Concentration (%): 35    LABS/DIAGNOSTIC TESTS:                          8.9    8.65  )-----------( 53       ( 22 Sep 2023 03:53 )             26.0         09-22    137  |  106  |  29<H>  ----------------------------<  196<H>  3.3<L>   |  17<L>  |  2.08<H>    Ca    8.8      22 Sep 2023 03:53  Phos  2.9     09-22  Mg     2.0     09-22    TPro  6.2  /  Alb  3.7  /  TBili  1.9<H>  /  DBili  x   /  AST  8<L>  /  ALT  11  /  AlkPhos  101  09-22      LIVER FUNCTIONS - ( 22 Sep 2023 03:53 )  Alb: 3.7 g/dL / Pro: 6.2 g/dL / ALK PHOS: 101 U/L / ALT: 11 U/L DA / AST: 8 U/L / GGT: x             CULTURES: Abdominal Fl Abdominal Fluid  09-15 @ 19:50   Rare Streptococcus constellatus "Susceptibilities not performed"  --    polymorphonuclear leukocytes seen  No organisms seen  by cytocentrifuge      .Blood Blood-Peripheral  09-15 @ 14:10   Growth in aerobic bottle: Staphylococcus capitis  Coagulase Negative Staphylococci isolated from a single blood culture set  may represent contamination.  Contact the Microbiology Department at [phone] if susceptibility  testing is clinically indicated.  Direct identification is available within approximately 3-5  hours either by Blood Panel Multiplexed PCR or Direct  MALDI-TOF. Details: [email or web address]  --  Blood Culture PCR      .Blood Blood-Peripheral  09-15 @ 14:00   No growth at 5 days  --  --            RADIOLOGY:      ROS:  [ x ] UNABLE TO ELICIT

## 2023-09-22 NOTE — CONSULT NOTE ADULT - PROBLEM SELECTOR RECOMMENDATION 3
Patient with end stage alcoholic cirrhosis, aggravated by history of recurrent gallbladder/biliary disease (including porcelain gallbladder and Mirizzi syndrome).  MELD score of 25.  Now with concern for development of HRS.  Overall prognosis is poor, especially given his age, medical frailty, and multiple medical comorbidities.  Patient is medically appropriate for hospice with likely prognosis of 6 months or less, regardless of treatment    See GOC discussion above--family remains hopeful for full recover, wishes for patient to remain Full Code and to continue with all aggressive treatments at this time    Continue supportive care

## 2023-09-23 NOTE — PROGRESS NOTE ADULT - ASSESSMENT
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 Pt a/w septic shock 2/2 SBP/ Bacteremia & decompensated liver cirrhosis. Nephrology consulted for Elevated serum creatinine.  s/p CT abd/ pelvis with IV contrast 9/15 with liver mass, and intraductal mass superior to pancreatic head    1. KAY- shaylee SCr 0.7-1. KAY in the setting of cirrhosis; HRS vs ATN vs LEA (s/p CT with IV contrast on 9/15) with hypotension. UA trace protein, large blood and mod LE. FeNa 0.35% s/p albumin x 2 days (9/19-9/21) with no renal improvement (less likely pre-renal).    Pt now on tx for HRS with midodrine 5mg PO q8hrs, Octreotide gtt 50mcg/hr s/p albumin. Please renew albumin (can do 25% in 50 mL Q6 hours to limit volume X 2 days)  FeNa low. Renal US with no hydro. c/w current tx. Strict I/Os. Avoid nephrotoxins/ NSAIDs/ RCA. Monitor BMP.    2. Septic shock- due to spontaneous bacterial peritonitis and Staph capitis bacteremia. Plan as per ICU/ ID.     3. Hypotension- BP improved. Pt off IV pressors. Currently on Midodrine 5mg PO tid. Monitor BP    4. Cirrhosis - with liver mass. Plan as per Hepatology.    Would repeat blood gas and can start sodium bicarbonate orally if patient acidemic.       [facility] NEPHROLOGY  Aubrey Nguyen M.D.  Tod Brewer D.O.  Thalia Oshea M.D.  MD Charlene Monae, MSN, ANP-C    Telephone: [phone]  Facsimile: [phone] [place] [address], [address]  [place] [address]

## 2023-09-23 NOTE — PROGRESS NOTE ADULT - SUBJECTIVE AND OBJECTIVE BOX
INTERVAL HPI/OVERNIGHT EVENTS: no acute overnight events     PRESSORS: [ ] YES [ ] NO  WHICH:    Antimicrobial:  piperacillin/tazobactam IVPB.. 3.375 Gram(s) IV Intermittent every 8 hours    Cardiovascular:  midodrine 5 milliGRAM(s) Oral every 8 hours    Pulmonary:    Hematalogic:    Other:  acetaminophen     Tablet .. 650 milliGRAM(s) Oral every 6 hours PRN  albumin human 25% IVPB 100 milliLiter(s) IV Intermittent every 8 hours  chlorhexidine 0.12% Liquid 15 milliLiter(s) Oral Mucosa every 12 hours  chlorhexidine 2% Cloths 1 Application(s) Topical daily  octreotide  Infusion 50 MICROgram(s)/Hr IV Continuous <Continuous>  pantoprazole  Injectable 40 milliGRAM(s) IV Push daily  sodium chloride 0.9% lock flush 10 milliLiter(s) IV Push every 1 hour PRN    acetaminophen     Tablet .. 650 milliGRAM(s) Oral every 6 hours PRN  albumin human 25% IVPB 100 milliLiter(s) IV Intermittent every 8 hours  chlorhexidine 0.12% Liquid 15 milliLiter(s) Oral Mucosa every 12 hours  chlorhexidine 2% Cloths 1 Application(s) Topical daily  midodrine 5 milliGRAM(s) Oral every 8 hours  octreotide  Infusion 50 MICROgram(s)/Hr IV Continuous <Continuous>  pantoprazole  Injectable 40 milliGRAM(s) IV Push daily  piperacillin/tazobactam IVPB.. 3.375 Gram(s) IV Intermittent every 8 hours  sodium chloride 0.9% lock flush 10 milliLiter(s) IV Push every 1 hour PRN    Drug Dosing Weight  Height (cm): 170.2 (15 Sep 2023 11:37)  Weight (kg): 64.7 (16 Sep 2023 16:30)  BMI (kg/m2): 22.3 (16 Sep 2023 16:30)  BSA (m2): 1.75 (16 Sep 2023 16:30)    CENTRAL LINE: [ ] YES [ ] NO  LOCATION:   DATE INSERTED:  REMOVE: [ ] YES [ ] NO  EXPLAIN:    REYNOLDS: [ ] YES [ ] NO    DATE INSERTED:  REMOVE:  [ ] YES [ ] NO  EXPLAIN:    A-LINE:  [ ] YES [ ] NO  LOCATION:   DATE INSERTED:  REMOVE:  [ ] YES [ ] NO  EXPLAIN:    PMH -reviewed admission note, no change since admission  PAST MEDICAL & SURGICAL HISTORY:  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      Liver cirrhosis  alcoholic/WALL cirrhosis c/b variceal bleed s/p banding (8/2018) and hepatic encephalopathy (several years ago)      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)      Pancreatitis  2/2 choledocholithiasis s/p ERCP with stone extraction and plastic stent placement (11/2019, stent now removed)      Hematochezia  2/2 colonic AVMs s/p cauterization and hemorrhoids (11/2019)      Melena  2/2 duodenal ulcers s/p argon plasma coagulation (4/2020)      Chronic kidney disease (CKD)      GERD (gastroesophageal reflux disease)      Hypothyroidism      H/O inguinal hernia repair      History of repair of hip fracture  R hip      History of hip replacement  10/2020      H/O spinal stenosis          ICU Vital Signs Last 24 Hrs  T(C): 36.6 (23 Sep 2023 00:00), Max: 36.9 (22 Sep 2023 21:00)  T(F): 97.9 (23 Sep 2023 00:00), Max: 98.4 (22 Sep 2023 21:00)  HR: 77 (23 Sep 2023 00:17) (67 - 86)  BP: 99/61 (23 Sep 2023 00:00) (97/65 - 130/86)  BP(mean): 74 (23 Sep 2023 00:00) (74 - 100)  ABP: --  ABP(mean): --  RR: 23 (23 Sep 2023 00:00) (20 - 31)  SpO2: 100% (23 Sep 2023 00:17) (99% - 100%)    O2 Parameters below as of 22 Sep 2023 07:00  Patient On (Oxygen Delivery Method): ventilator    O2 Concentration (%): 35        ABG - ( 21 Sep 2023 03:29 )  pH, Arterial: 7.34  pH, Blood: x     /  pCO2: 29    /  pO2: 152   / HCO3: 16    / Base Excess: -8.8  /  SaO2: 100                   09-21 @ 07:01  -  09-22 @ 07:00  --------------------------------------------------------  IN: 2610 mL / OUT: 310 mL / NET: 2300 mL        Mode: AC/ CMV (Assist Control/ Continuous Mandatory Ventilation)  RR (machine): 16  TV (machine): 400  FiO2: 35  PEEP: 5  ITime: 0.9  MAP: 12  PIP: 27      PHYSICAL EXAM:    GENERAL: NAD, not responding to verbal stimulation, AAOX0, unable to follow commands  HEAD:  Atraumatic, Normocephalic  EYES: EOMI, PERRLA, conjunctiva and sclera clear  ENMT: No tonsillar erythema, exudates, or enlargement; Moist mucous membranes, Good dentition, No lesions  NECK: Supple, normal appearance, No JVD; Normal thyroid; Trachea midline  NERVOUS SYSTEM:  Alert & Oriented X0, unable to assess strength  CHEST/LUNG: No chest deformity; Normal percussion bilaterally; No rales, rhonchi, wheezing   HEART: Regular rate and rhythm; No murmurs, rubs, or gallops  ABDOMEN: Distended, soft, hypoactive bowel sounds  EXTREMITIES:  2+ Peripheral Pulses, No clubbing, cyanosis, or edema  LYMPH: No lymphadenopathy noted  SKIN: No rashes or lesions;  Good capillary refill        LABS:  CBC Full  -  ( 22 Sep 2023 03:53 )  WBC Count : 8.65 K/uL  RBC Count : 3.45 M/uL  Hemoglobin : 8.9 g/dL  Hematocrit : 26.0 %  Platelet Count - Automated : 53 K/uL  Mean Cell Volume : 75.4 fl  Mean Cell Hemoglobin : 25.8 pg  Mean Cell Hemoglobin Concentration : 34.2 gm/dL  Auto Neutrophil # : x  Auto Lymphocyte # : x  Auto Monocyte # : x  Auto Eosinophil # : x  Auto Basophil # : x  Auto Neutrophil % : x  Auto Lymphocyte % : x  Auto Monocyte % : x  Auto Eosinophil % : x  Auto Basophil % : x    09-22    137  |  106  |  29<H>  ----------------------------<  196<H>  3.3<L>   |  17<L>  |  2.08<H>    Ca    8.8      22 Sep 2023 03:53  Phos  2.9     09-22  Mg     2.0     09-22    TPro  6.2  /  Alb  3.7  /  TBili  1.9<H>  /  DBili  x   /  AST  8<L>  /  ALT  11  /  AlkPhos  101  09-22      Urinalysis Basic - ( 22 Sep 2023 03:53 )    Color: x / Appearance: x / SG: x / pH: x  Gluc: 196 mg/dL / Ketone: x  / Bili: x / Urobili: x   Blood: x / Protein: x / Nitrite: x   Leuk Esterase: x / RBC: x / WBC x   Sq Epi: x / Non Sq Epi: x / Bacteria: x          RADIOLOGY & ADDITIONAL STUDIES REVIEWED:  ***    [ ]GOALS OF CARE DISCUSSION WITH PATIENT/FAMILY/PROXY:    CRITICAL CARE TIME SPENT: 35 minutes INTERVAL HPI/OVERNIGHT EVENTS: no acute overnight events     PRESSORS: [ ] YES [ ] NO  WHICH:    Antimicrobial:  piperacillin/tazobactam IVPB.. 3.375 Gram(s) IV Intermittent every 8 hours    Cardiovascular:  midodrine 5 milliGRAM(s) Oral every 8 hours    Pulmonary:    Hematalogic:    Other:  acetaminophen     Tablet .. 650 milliGRAM(s) Oral every 6 hours PRN  albumin human 25% IVPB 100 milliLiter(s) IV Intermittent every 8 hours  chlorhexidine 0.12% Liquid 15 milliLiter(s) Oral Mucosa every 12 hours  chlorhexidine 2% Cloths 1 Application(s) Topical daily  octreotide  Infusion 50 MICROgram(s)/Hr IV Continuous <Continuous>  pantoprazole  Injectable 40 milliGRAM(s) IV Push daily  sodium chloride 0.9% lock flush 10 milliLiter(s) IV Push every 1 hour PRN    acetaminophen     Tablet .. 650 milliGRAM(s) Oral every 6 hours PRN  albumin human 25% IVPB 100 milliLiter(s) IV Intermittent every 8 hours  chlorhexidine 0.12% Liquid 15 milliLiter(s) Oral Mucosa every 12 hours  chlorhexidine 2% Cloths 1 Application(s) Topical daily  midodrine 5 milliGRAM(s) Oral every 8 hours  octreotide  Infusion 50 MICROgram(s)/Hr IV Continuous <Continuous>  pantoprazole  Injectable 40 milliGRAM(s) IV Push daily  piperacillin/tazobactam IVPB.. 3.375 Gram(s) IV Intermittent every 8 hours  sodium chloride 0.9% lock flush 10 milliLiter(s) IV Push every 1 hour PRN    Drug Dosing Weight  Height (cm): 170.2 (15 Sep 2023 11:37)  Weight (kg): 64.7 (16 Sep 2023 16:30)  BMI (kg/m2): 22.3 (16 Sep 2023 16:30)  BSA (m2): 1.75 (16 Sep 2023 16:30)    CENTRAL LINE: [ ] YES [ ] NO  LOCATION:   DATE INSERTED:  REMOVE: [ ] YES [ ] NO  EXPLAIN:    REYNOLDS: [ ] YES [ ] NO    DATE INSERTED:  REMOVE:  [ ] YES [ ] NO  EXPLAIN:    A-LINE:  [ ] YES [ ] NO  LOCATION:   DATE INSERTED:  REMOVE:  [ ] YES [ ] NO  EXPLAIN:    PMH -reviewed admission note, no change since admission  PAST MEDICAL & SURGICAL HISTORY:  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      Liver cirrhosis  alcoholic/WALL cirrhosis c/b variceal bleed s/p banding (8/2018) and hepatic encephalopathy (several years ago)      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)      Pancreatitis  2/2 choledocholithiasis s/p ERCP with stone extraction and plastic stent placement (11/2019, stent now removed)      Hematochezia  2/2 colonic AVMs s/p cauterization and hemorrhoids (11/2019)      Melena  2/2 duodenal ulcers s/p argon plasma coagulation (4/2020)      Chronic kidney disease (CKD)      GERD (gastroesophageal reflux disease)      Hypothyroidism      H/O inguinal hernia repair      History of repair of hip fracture  R hip      History of hip replacement  10/2020      H/O spinal stenosis          ICU Vital Signs Last 24 Hrs  T(C): 36.6 (23 Sep 2023 00:00), Max: 36.9 (22 Sep 2023 21:00)  T(F): 97.9 (23 Sep 2023 00:00), Max: 98.4 (22 Sep 2023 21:00)  HR: 77 (23 Sep 2023 00:17) (67 - 86)  BP: 99/61 (23 Sep 2023 00:00) (97/65 - 130/86)  BP(mean): 74 (23 Sep 2023 00:00) (74 - 100)  ABP: --  ABP(mean): --  RR: 23 (23 Sep 2023 00:00) (20 - 31)  SpO2: 100% (23 Sep 2023 00:17) (99% - 100%)    O2 Parameters below as of 22 Sep 2023 07:00  Patient On (Oxygen Delivery Method): ventilator    O2 Concentration (%): 35        ABG - ( 21 Sep 2023 03:29 )  pH, Arterial: 7.34  pH, Blood: x     /  pCO2: 29    /  pO2: 152   / HCO3: 16    / Base Excess: -8.8  /  SaO2: 100                   09-21 @ 07:01  -  09-22 @ 07:00  --------------------------------------------------------  IN: 2610 mL / OUT: 310 mL / NET: 2300 mL        Mode: AC/ CMV (Assist Control/ Continuous Mandatory Ventilation)  RR (machine): 16  TV (machine): 400  FiO2: 35  PEEP: 5  ITime: 0.9  MAP: 12  PIP: 27      PHYSICAL EXAM:    GENERAL: NAD, minimally responsive to voice, AAOX1, not following commands  HEAD:  Atraumatic, Normocephalic  EYES: EOMI, PERRLA, 3mm and sluggish, conjunctiva and sclera clear  NECK: Supple, normal appearance, No JVD; Normal thyroid; Trachea midline  NERVOUS SYSTEM:  Alert & Oriented X1, no effort to move noted, limited passive ROM  CHEST/LUNG: diminished to auscultation bilaterally; No rales, rhonchi, wheezing   HEART: Regular rate and rhythm; No murmurs, rubs, or gallops  ABDOMEN: Distended, soft, hypoactive bowel sounds  EXTREMITIES:  2+ Peripheral Pulses, No clubbing, cyanosis, or edema  SKIN: No rashes or lesions;  brisk capillary refill        LABS:  CBC Full  -  ( 22 Sep 2023 03:53 )  WBC Count : 8.65 K/uL  RBC Count : 3.45 M/uL  Hemoglobin : 8.9 g/dL  Hematocrit : 26.0 %  Platelet Count - Automated : 53 K/uL  Mean Cell Volume : 75.4 fl  Mean Cell Hemoglobin : 25.8 pg  Mean Cell Hemoglobin Concentration : 34.2 gm/dL  Auto Neutrophil # : x  Auto Lymphocyte # : x  Auto Monocyte # : x  Auto Eosinophil # : x  Auto Basophil # : x  Auto Neutrophil % : x  Auto Lymphocyte % : x  Auto Monocyte % : x  Auto Eosinophil % : x  Auto Basophil % : x    09-22    137  |  106  |  29<H>  ----------------------------<  196<H>  3.3<L>   |  17<L>  |  2.08<H>    Ca    8.8      22 Sep 2023 03:53  Phos  2.9     09-22  Mg     2.0     09-22    TPro  6.2  /  Alb  3.7  /  TBili  1.9<H>  /  DBili  x   /  AST  8<L>  /  ALT  11  /  AlkPhos  101  09-22      Urinalysis Basic - ( 22 Sep 2023 03:53 )    Color: x / Appearance: x / SG: x / pH: x  Gluc: 196 mg/dL / Ketone: x  / Bili: x / Urobili: x   Blood: x / Protein: x / Nitrite: x   Leuk Esterase: x / RBC: x / WBC x   Sq Epi: x / Non Sq Epi: x / Bacteria: x          RADIOLOGY & ADDITIONAL STUDIES REVIEWED:  ***    [ ]GOALS OF CARE DISCUSSION WITH PATIENT/FAMILY/PROXY:    CRITICAL CARE TIME SPENT: 35 minutes

## 2023-09-23 NOTE — PROGRESS NOTE ADULT - SUBJECTIVE AND OBJECTIVE BOX
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 Pt a/w septic shock 2/2 SBP/ Bacteremia & decompensated liver cirrhosis. Nephrology consulted for Elevated serum creatinine.  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    No Known Allergies      Hospital Medications: Medications reviewed    VITALS:  T(F): 96.8 (09-23-23 @ 12:00), Max: 98.4 (09-22-23 @ 21:00)  HR: 62 (09-23-23 @ 16:20)  BP: 119/66 (09-23-23 @ 12:00)  RR: 29 (09-23-23 @ 12:00)  SpO2: 100% (09-23-23 @ 16:20)  Wt(kg): --  Height (cm): 170.2 (09-15 @ 11:37)  Weight (kg): 64.7 (09-16 @ 16:30)  BMI (kg/m2): 22.3 (09-16 @ 16:30)  BSA (m2): 1.75 (09-16 @ 16:30)    09-22 @ 07:01  -  09-23 @ 07:00  --------------------------------------------------------  IN: 1770 mL / OUT: 1104 mL / NET: 666 mL    09-23 @ 07:01  -  09-23 @ 16:35  --------------------------------------------------------  IN: 270 mL / OUT: 50 mL / NET: 220 mL        PHYSICAL EXAM:    Gen: NAD, intubated  Cards: RRR, +S1/S2, no M/G/R  Resp: course BS B/L  GI: soft, NT, + ab distention, NABS  : + rodriguez  Vascular:  + anasarca    LABS:  09-23    135  |  104  |  37<H>  ----------------------------<  164<H>  3.6   |  17<L>  |  2.12<H>    Ca    9.1      23 Sep 2023 04:00  Phos  3.3     09-23  Mg     2.1     09-23    TPro  6.1  /  Alb  3.8  /  TBili  1.8<H>  /  DBili      /  AST  7<L>  /  ALT  9<L>  /  AlkPhos  70  09-23    Creatinine Trend: 2.12 <--, 2.08 <--, 2.02 <--, 1.94 <--, 1.93 <--, 1.94 <--, 1.82 <--, 1.57 <--, 1.51 <--                        7.0    10.61 )-----------( 60       ( 23 Sep 2023 04:00 )             19.5     Urine Studies:  Urinalysis Basic - ( 23 Sep 2023 04:00 )    Color:  / Appearance:  / SG:  / pH:   Gluc: 164 mg/dL / Ketone:   / Bili:  / Urobili:    Blood:  / Protein:  / Nitrite:    Leuk Esterase:  / RBC:  / WBC    Sq Epi:  / Non Sq Epi:  / Bacteria:       Sodium, Random Urine: <5 mmol/L (09-23 @ 14:26)  Osmolality, Random Urine: 373 mos/kg (09-23 @ 14:26)  Potassium, Random Urine: 40 mmol/L (09-23 @ 14:26)  Creatinine, Random Urine: 71 mg/dL (09-23 @ 14:26)  Sodium, Random Urine: 12 mmol/L (09-18 @ 10:50)  Creatinine, Random Urine: 45 mg/dL (09-18 @ 10:50)      RADIOLOGY & ADDITIONAL STUDIES:

## 2023-09-24 NOTE — PROGRESS NOTE ADULT - SUBJECTIVE AND OBJECTIVE BOX
INTERVAL HPI/OVERNIGHT EVENTS: No acute ovenright events. Pt seen at bedside, intubated, off sedation, AAOX0    PRESSORS: [ ] YES [x] NO  WHICH:    Antimicrobial:    Cardiovascular:  midodrine 5 milliGRAM(s) Oral every 8 hours    Pulmonary:    Hematalogic:    Other:  acetaminophen     Tablet .. 650 milliGRAM(s) Oral every 6 hours PRN  chlorhexidine 0.12% Liquid 15 milliLiter(s) Oral Mucosa every 12 hours  chlorhexidine 2% Cloths 1 Application(s) Topical daily  octreotide  Infusion 50 MICROgram(s)/Hr IV Continuous <Continuous>  pantoprazole  Injectable 40 milliGRAM(s) IV Push daily  sodium chloride 0.9% lock flush 10 milliLiter(s) IV Push every 1 hour PRN    acetaminophen     Tablet .. 650 milliGRAM(s) Oral every 6 hours PRN  chlorhexidine 0.12% Liquid 15 milliLiter(s) Oral Mucosa every 12 hours  chlorhexidine 2% Cloths 1 Application(s) Topical daily  midodrine 5 milliGRAM(s) Oral every 8 hours  octreotide  Infusion 50 MICROgram(s)/Hr IV Continuous <Continuous>  pantoprazole  Injectable 40 milliGRAM(s) IV Push daily  sodium chloride 0.9% lock flush 10 milliLiter(s) IV Push every 1 hour PRN    Drug Dosing Weight  Height (cm): 170.2 (15 Sep 2023 11:37)  Weight (kg): 64.7 (16 Sep 2023 16:30)  BMI (kg/m2): 22.3 (16 Sep 2023 16:30)  BSA (m2): 1.75 (16 Sep 2023 16:30)    CENTRAL LINE: [ ] YES [ ] NO  LOCATION:   DATE INSERTED:  REMOVE: [ ] YES [ ] NO  EXPLAIN:    REYNOLDS: [ ] YES [ ] NO    DATE INSERTED:  REMOVE:  [ ] YES [ ] NO  EXPLAIN:    A-LINE:  [ ] YES [ ] NO  LOCATION:   DATE INSERTED:  REMOVE:  [ ] YES [ ] NO  EXPLAIN:    PMH -reviewed admission note, no change since admission  PAST MEDICAL & SURGICAL HISTORY:  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      Liver cirrhosis  alcoholic/WALL cirrhosis c/b variceal bleed s/p banding (8/2018) and hepatic encephalopathy (several years ago)      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)      Pancreatitis  2/2 choledocholithiasis s/p ERCP with stone extraction and plastic stent placement (11/2019, stent now removed)      Hematochezia  2/2 colonic AVMs s/p cauterization and hemorrhoids (11/2019)      Melena  2/2 duodenal ulcers s/p argon plasma coagulation (4/2020)      Chronic kidney disease (CKD)      GERD (gastroesophageal reflux disease)      Hypothyroidism      H/O inguinal hernia repair      History of repair of hip fracture  R hip      History of hip replacement  10/2020      H/O spinal stenosis          ICU Vital Signs Last 24 Hrs  T(C): 36.2 (24 Sep 2023 00:00), Max: 36.5 (23 Sep 2023 01:00)  T(F): 97.2 (24 Sep 2023 00:00), Max: 97.7 (23 Sep 2023 01:00)  HR: 69 (24 Sep 2023 00:07) (61 - 82)  BP: 112/56 (24 Sep 2023 00:00) (94/60 - 132/65)  BP(mean): 73 (24 Sep 2023 00:00) (71 - 93)  ABP: --  ABP(mean): --  RR: 22 (24 Sep 2023 00:00) (17 - 29)  SpO2: 100% (24 Sep 2023 00:07) (100% - 100%)              09-22 @ 07:01  -  09-23 @ 07:00  --------------------------------------------------------  IN: 1770 mL / OUT: 1104 mL / NET: 666 mL        Mode: AC/ CMV (Assist Control/ Continuous Mandatory Ventilation)  RR (machine): 16  TV (machine): 400  FiO2: 35  PEEP: 5  ITime: 0.8  MAP: 9  PIP: 19      PHYSICAL EXAM:    GENERAL: NAD, minimally responsive to voice, AAOX0, not following commands  HEAD:  Atraumatic, Normocephalic  EYES: EOMI, PERRLA, 3mm and sluggish, conjunctiva and sclera clear  NECK: Supple, normal appearance, No JVD; Normal thyroid; Trachea midline  NERVOUS SYSTEM:  Alert & Oriented X0, no effort to move noted, limited passive ROM  CHEST/LUNG: diminished to auscultation bilaterally; No rales, rhonchi, wheezing   HEART: Regular rate and rhythm; No murmurs, rubs, or gallops  ABDOMEN: Distended, soft, hypoactive bowel sounds  EXTREMITIES:  2+ Peripheral Pulses, No clubbing, cyanosis, or edema  SKIN: No rashes or lesions;  brisk capillary refill      LABS:  CBC Full  -  ( 23 Sep 2023 14:26 )  WBC Count : 12.07 K/uL  RBC Count : 2.86 M/uL  Hemoglobin : 7.9 g/dL  Hematocrit : 22.2 %  Platelet Count - Automated : 57 K/uL  Mean Cell Volume : 77.6 fl  Mean Cell Hemoglobin : 27.6 pg  Mean Cell Hemoglobin Concentration : 35.6 gm/dL  Auto Neutrophil # : x  Auto Lymphocyte # : x  Auto Monocyte # : x  Auto Eosinophil # : x  Auto Basophil # : x  Auto Neutrophil % : x  Auto Lymphocyte % : x  Auto Monocyte % : x  Auto Eosinophil % : x  Auto Basophil % : x    09-23    135  |  104  |  37<H>  ----------------------------<  164<H>  3.6   |  17<L>  |  2.12<H>    Ca    9.1      23 Sep 2023 04:00  Phos  3.3     09-23  Mg     2.1     09-23    TPro  6.1  /  Alb  3.8  /  TBili  1.8<H>  /  DBili  x   /  AST  7<L>  /  ALT  9<L>  /  AlkPhos  70  09-23    PT/INR - ( 23 Sep 2023 05:27 )   PT: 24.2 sec;   INR: 2.17 ratio         PTT - ( 23 Sep 2023 05:27 )  PTT:53.6 sec  Urinalysis Basic - ( 23 Sep 2023 04:00 )    Color: x / Appearance: x / SG: x / pH: x  Gluc: 164 mg/dL / Ketone: x  / Bili: x / Urobili: x   Blood: x / Protein: x / Nitrite: x   Leuk Esterase: x / RBC: x / WBC x   Sq Epi: x / Non Sq Epi: x / Bacteria: x          RADIOLOGY & ADDITIONAL STUDIES REVIEWED:  ***    [ ]GOALS OF CARE DISCUSSION WITH PATIENT/FAMILY/PROXY:    CRITICAL CARE TIME SPENT: 35 minutes INTERVAL HPI/ OVERNIGHT EVENTS: No acute overnight events. Pt seen at bedside, intubated, off sedation, AAOX0.     PRESSORS: [ ] YES [x] NO  WHICH:    Antimicrobial:    Cardiovascular:  midodrine 5 milliGRAM(s) Oral every 8 hours    Pulmonary:    Hematalogic:    Other:  acetaminophen     Tablet .. 650 milliGRAM(s) Oral every 6 hours PRN  chlorhexidine 0.12% Liquid 15 milliLiter(s) Oral Mucosa every 12 hours  chlorhexidine 2% Cloths 1 Application(s) Topical daily  octreotide  Infusion 50 MICROgram(s)/Hr IV Continuous <Continuous>  pantoprazole  Injectable 40 milliGRAM(s) IV Push daily  sodium chloride 0.9% lock flush 10 milliLiter(s) IV Push every 1 hour PRN    acetaminophen     Tablet .. 650 milliGRAM(s) Oral every 6 hours PRN  chlorhexidine 0.12% Liquid 15 milliLiter(s) Oral Mucosa every 12 hours  chlorhexidine 2% Cloths 1 Application(s) Topical daily  midodrine 5 milliGRAM(s) Oral every 8 hours  octreotide  Infusion 50 MICROgram(s)/Hr IV Continuous <Continuous>  pantoprazole  Injectable 40 milliGRAM(s) IV Push daily  sodium chloride 0.9% lock flush 10 milliLiter(s) IV Push every 1 hour PRN    Drug Dosing Weight  Height (cm): 170.2 (15 Sep 2023 11:37)  Weight (kg): 64.7 (16 Sep 2023 16:30)  BMI (kg/m2): 22.3 (16 Sep 2023 16:30)  BSA (m2): 1.75 (16 Sep 2023 16:30)    CENTRAL LINE: [X ] YES [ ] NO  LOCATION:   Right IJ TLC DATE INSERTED: 9/16/23  REMOVE: [ ] YES [ ] NO  EXPLAIN:    REYNOLDS: [X ] YES [ ] NO    DATE INSERTED: 9/16  REMOVE:  [ ] YES [ ] NO  EXPLAIN:    A-LINE:  [ ] YES [X ] NO  LOCATION:   DATE INSERTED:  REMOVE:  [ ] YES [ ] NO  EXPLAIN:    PMH -reviewed admission note, no change since admission  PAST MEDICAL & SURGICAL HISTORY:  Guillain-[place] syndrome  1996 after flu vaccine      Liver cirrhosis  alcoholic/WALL cirrhosis c/b variceal bleed s/p banding (8/2018) and hepatic encephalopathy (several years ago)      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)      Pancreatitis  2/2 choledocholithiasis s/p ERCP with stone extraction and plastic stent placement (11/2019, stent now removed)      Hematochezia  2/2 colonic AVMs s/p cauterization and hemorrhoids (11/2019)      Melena  2/2 duodenal ulcers s/p argon plasma coagulation (4/2020)      Chronic kidney disease (CKD)      GERD (gastroesophageal reflux disease)      Hypothyroidism      H/O inguinal hernia repair      History of repair of hip fracture  R hip      History of hip replacement  10/2020      H/O spinal stenosis          ICU Vital Signs Last 24 Hrs  T(C): 36.2 (24 Sep 2023 00:00), Max: 36.5 (23 Sep 2023 01:00)  T(F): 97.2 (24 Sep 2023 00:00), Max: 97.7 (23 Sep 2023 01:00)  HR: 69 (24 Sep 2023 00:07) (61 - 82)  BP: 112/56 (24 Sep 2023 00:00) (94/60 - 132/65)  BP(mean): 73 (24 Sep 2023 00:00) (71 - 93)  RR: 22 (24 Sep 2023 00:00) (17 - 29)  SpO2: 100% (24 Sep 2023 00:07) (100% - 100%)              09-22 @ 07:01  -  09-23 @ 07:00  --------------------------------------------------------  IN: 1770 mL / OUT: 1104 mL / NET: 666 mL        Mode: AC/ CMV (Assist Control/ Continuous Mandatory Ventilation)  RR (machine): 16  TV (machine): 400  FiO2: 35  PEEP: 5  ITime: 0.8  MAP: 9  PIP: 19      PHYSICAL EXAM:    GENERAL: NAD, minimally responsive to voice, AAOX0, not following commands  HEAD:  Atraumatic, Normocephalic  EYES: EOMI, PERRLA, 3mm and brisk, conjunctiva and sclera clear  NECK: Supple, normal appearance, No JVD; Normal thyroid; Trachea midline  NERVOUS SYSTEM:  Alert & Oriented X0, no effort to move noted, limited passive ROM  CHEST/LUNG: diminished to auscultation bilaterally; No rales, rhonchi, wheezing   HEART: Regular rate and rhythm; No murmurs, rubs, or gallops  ABDOMEN: Distended, soft, hypoactive bowel sounds  EXTREMITIES:  2+ Peripheral Pulses, No clubbing, cyanosis, +2 generalized edema  SKIN: No rashes or lesions;  brisk capillary refill, UE ecchymosis noted, weeping upper extremities      LABS:  CBC Full  -  ( 23 Sep 2023 14:26 )  WBC Count : 12.07 K/uL  RBC Count : 2.86 M/uL  Hemoglobin : 7.9 g/dL  Hematocrit : 22.2 %  Platelet Count - Automated : 57 K/uL  Mean Cell Volume : 77.6 fl  Mean Cell Hemoglobin : 27.6 pg  Mean Cell Hemoglobin Concentration : 35.6 gm/dL  Auto Neutrophil # : x  Auto Lymphocyte # : x  Auto Monocyte # : x  Auto Eosinophil # : x  Auto Basophil # : x  Auto Neutrophil % : x  Auto Lymphocyte % : x  Auto Monocyte % : x  Auto Eosinophil % : x  Auto Basophil % : x    09-23    135  |  104  |  37<H>  ----------------------------<  164<H>  3.6   |  17<L>  |  2.12<H>    Ca    9.1      23 Sep 2023 04:00  Phos  3.3     09-23  Mg     2.1     09-23    TPro  6.1  /  Alb  3.8  /  TBili  1.8<H>  /  DBili  x   /  AST  7<L>  /  ALT  9<L>  /  AlkPhos  70  09-23    PT/INR - ( 23 Sep 2023 05:27 )   PT: 24.2 sec;   INR: 2.17 ratio         PTT - ( 23 Sep 2023 05:27 )  PTT:53.6 sec  Urinalysis Basic - ( 23 Sep 2023 04:00 )    Color: x / Appearance: x / SG: x / pH: x  Gluc: 164 mg/dL / Ketone: x  / Bili: x / Urobili: x   Blood: x / Protein: x / Nitrite: x   Leuk Esterase: x / RBC: x / WBC x   Sq Epi: x / Non Sq Epi: x / Bacteria: x          RADIOLOGY & ADDITIONAL STUDIES REVIEWED:  ***    [ ]GOALS OF CARE DISCUSSION WITH PATIENT/FAMILY/PROXY:    CRITICAL CARE TIME SPENT: 35 minutes

## 2023-09-24 NOTE — PROCEDURE NOTE - NSPROCNAME_GEN_A_CORE
Gastric Intubation/Gastric Lavage
Tracheal Intubation
Central Line Insertion
Paracentesis
Peripheral Line Insertion

## 2023-09-24 NOTE — PROGRESS NOTE ADULT - SUBJECTIVE AND OBJECTIVE BOX
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 Pt a/w septic shock 2/2 SBP/ Bacteremia & decompensated liver cirrhosis. Nephrology consulted for Elevated serum creatinine.  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    No Known Allergies      Hospital Medications: Medications reviewed      VITALS:  T(F): 98.6 (09-24-23 @ 16:00), Max: 98.6 (09-24-23 @ 16:00)  HR: 72 (09-24-23 @ 16:14)  BP: 108/65 (09-24-23 @ 16:00)  RR: 23 (09-24-23 @ 16:00)  SpO2: 100% (09-24-23 @ 16:14)  Wt(kg): --    09-23 @ 07:01  -  09-24 @ 07:00  --------------------------------------------------------  IN: 1370 mL / OUT: 325 mL / NET: 1045 mL    09-24 @ 07:01  -  09-24 @ 17:12  --------------------------------------------------------  IN: 495 mL / OUT: 115 mL / NET: 380 mL        PHYSICAL EXAM:    Gen: NAD, intubated  Cards: RRR, +S1/S2, no M/G/R  Resp: course BS B/L  GI: soft, NT, + ab distention, NABS  : + rodriguez  Vascular:  + anasarca      LABS:  09-24    137  |  106  |  41<H>  ----------------------------<  175<H>  3.7   |  17<L>  |  2.00<H>    Ca    9.0      24 Sep 2023 03:27  Phos  3.0     09-24  Mg     1.9     09-24    TPro  6.0  /  Alb  3.4<L>  /  TBili  2.1<H>  /  DBili      /  AST  8<L>  /  ALT  11  /  AlkPhos  69  09-24    Creatinine Trend: 2.00 <--, 2.12 <--, 2.08 <--, 2.02 <--, 1.94 <--, 1.93 <--, 1.94 <--, 1.82 <--                        7.8    14.13 )-----------( 65       ( 24 Sep 2023 03:27 )             21.7     Urine Studies:  Urinalysis Basic - ( 24 Sep 2023 03:27 )    Color:  / Appearance:  / SG:  / pH:   Gluc: 175 mg/dL / Ketone:   / Bili:  / Urobili:    Blood:  / Protein:  / Nitrite:    Leuk Esterase:  / RBC:  / WBC    Sq Epi:  / Non Sq Epi:  / Bacteria:       Sodium, Random Urine: <5 mmol/L (09-23 @ 14:26)  Osmolality, Random Urine: 373 mos/kg (09-23 @ 14:26)  Potassium, Random Urine: 40 mmol/L (09-23 @ 14:26)  Creatinine, Random Urine: 71 mg/dL (09-23 @ 14:26)  Sodium, Random Urine: 12 mmol/L (09-18 @ 10:50)  Creatinine, Random Urine: 45 mg/dL (09-18 @ 10:50)

## 2023-09-24 NOTE — PROGRESS NOTE ADULT - ASSESSMENT
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 Pt a/w septic shock 2/2 SBP/ Bacteremia & decompensated liver cirrhosis. Nephrology consulted for Elevated serum creatinine.  s/p CT abd/ pelvis with IV contrast 9/15 with liver mass, and intraductal mass superior to pancreatic head    1. KAY- shaylee SCr 0.7-1. KAY in the setting of cirrhosis; HRS vs ATN vs LEA (s/p CT with IV contrast on 9/15) with hypotension. UA trace protein, large blood and mod LE. FeNa 0.35% s/p albumin x 2 days (9/19-9/21) with no renal improvement (less likely pre-renal).    Pt now on tx for HRS with midodrine 5mg PO q8hrs, Octreotide gtt 50mcg/hr and IV albumin. Scr relatively stable with poor UO. If no improvement, can consider changing midodrine/octreotide to levo gtt and keeping MAP > 80.  Check LDH/haptoglobin r/o TMA.  FeNa low. Renal US with no hydro. c/w current tx. Strict I/Os. Avoid nephrotoxins/ NSAIDs/ RCA. Monitor BMP.  Patient with low serum CO2 but with mixed disorder and normal pH. No need for sodium bicarbonate.    2. Septic shock- due to spontaneous bacterial peritonitis and Staph capitis bacteremia. Plan as per ICU/ ID.     3. Hypotension- BP improved. Pt off IV pressors. Currently on Midodrine 5mg PO tid. Monitor BP    4. Cirrhosis - with liver mass. Plan as per Hepatology.        [facility] NEPHROLOGY  Aubrey Nguyen M.D.  Tod Brewer D.O.  Thalia Oshea M.D.  MD Charlene Monae, MSN, ANP-C    Telephone: [phone]  Facsimile: [phone]    [address] [place] [address], [address]  [place] [address]

## 2023-09-24 NOTE — PROGRESS NOTE ADULT - ASSESSMENT
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 As per wifehe has no  cough, runny nose. Patient has been having black stools for the past 2 days. Pt noted to have increased secretions, tachycardia, tachypnea and AHRF with increased WOB. Admitted to ICU for AHRF requiring intubation for airway protection.   # Acute Hypoxic Resp failure   # AMS / Encephalopathy   # Hypotension   # KAY on CKD with possibility for Hepatorenal syndrome   # Alcohol inappropriate use   # Liver cirrhosis   # SBP  # Sepsis       _________CNS___________  -Pt a&ox3 at baseline  -currently intubated, sedated, with poor neuro exam as above  - currently off all sedation  - CT 9/23 NEG  - Palliative consulted (9/21)  - on-going GoC discussions (patient was previously trached for Guillian-[place] syndrome)    _________CVS___________  #Sepsis   -secondary to SBP (confirmed on 9/15 with Diagnostic Para > 9000 Neutrophils)  -Peritoneal cultures negative, BCx negative (contaminated x 1)  - completed piperacillin-tazobactam,  - afebrile, off vasopressors, no leukocytosis    _________ RESP__________  #AHRF   -CT A/P/C: basilar atelectasis,   -started on Zosyn on admission  - intubated on 9/16 for Increased work of breathing  - mechanically ventilated off all sedation   - settings minimal but mental status too poor for extubation    __________GI____________  #Alcoholic Cirrhosis  -multiple hospitalization for encephalopathy    -on spironolactone and lactulose  -hypotensive likely due to splanchnic vasodilation, status post albumin    -MELD 18, INR: 1.8, ammonia level- 18  - CTA/P showing ascites   - will hold spironolactone for now  - s/p diagnostic paracentesis 9/15- confirmed SBP  - c/w octreotide and midodrine for HRS (9/21)    #SBP  - confirmed- s/p diagnostic para in ED on 9/15 with > 9000 neutrophils   - peritoneal fluid culture negative  - status post post course of albumin 9/18  - completed piperacillin-tazobactam      ________ RENAL__________  #KAY  - sCr1.7, hovering at 2   - was normal 7/2023  - suspect 2/2 HRS, minimal urine output,   - f/u repeat urine lytes  - Nephro Dr. Oshea consulted  - continue octreotide and midodrine for HRS (9/21)    _________MSK___________  Generalized weakness  No focal deficit     __________ID____________  #?bacteremia  - coagulase negative staph on one set of blood cultures  - peritoneal cultures Strep Constellatus,  - afebrile, no leukocytosis,  - completed zosyn  - repeat blood cultures today    _________ENDO__________  #no active issues    _______HEME/ONC_______  #microcytic Anemia  - likely multifactorial, in the setting of chronic illness and poor PO intake (nutritional deficiencies)    _________SKIN____________  Multiple area of echymosis on UE  fragile skin    ________Prophylaxis_______  #DVT SCDs, chemical ppx contraindicated  #GI     ________GOC/ DISPO_______  ICU   83 year old  Man  from home, ambulates with walker with  hypothyroidism, alcoholic cirrhosis, duodenal ulcers, AVM's, CKD, GBS, 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 As per wife he has no  cough, runny nose. Patient has been having black stools for the past 2 days. Pt noted to have increased secretions, tachycardia, tachypnea and AHRF with increased WOB. Admitted to ICU for AHRF requiring intubation for airway protection.     # Acute Hypoxic Resp failure   # AMS / Encephalopathy   # Hypotension   # KAY on CKD with possibility for Hepatorenal syndrome   # Alcohol inappropriate use   # Liver cirrhosis   # SBP  # Sepsis       _________CNS___________  -Pt a&ox3 at baseline  -currently intubated, currently off all sedation since 9/18, with poor neuro exam as above  - CT 9/23 NEG  - Palliative Following   - on-going GOC discussions with the family (patient was previously trached for Guillian-[place] syndrome)    _________CVS___________  #Sepsis   -secondary to Spontaneous Bacterial Peritonitis  (confirmed on 9/15 with Diagnostic Para > 9000 Neutrophils)  - Peritoneal culture +streptococcus constellatus (9/15), blood cultures +staphylococcus capitis (9/15)  - completed piperacillin-tazobactam 9/24  - f/u with repeat blood cultures 9/23    _________ RESP__________  #AHRF   - intubated on 9/16 for Increased work of breathing  - CT A/P/C: basilar atelectasis   - mechanically ventilated off all sedation   - ventilator settings minimal but mental status too poor for extubation    __________GI____________  #Alcoholic Cirrhosis  - multiple hospitalization for encephalopathy    - on spironolactone and lactulose at home   - hypotensive likely due to splanchnic vasodilation- off vasopressors 9/21, continue albumin 25% 50ml x2     - CTA/P showing ascites   - will hold spironolactone for now  - s/p diagnostic paracentesis 9/15- confirmed SBP         - piperacillin-tazobactam 3.375mg completed 9/24  - c/w octreotide and midodrine for HRS (9/21)    #SBP  - confirmed- s/p diagnostic paracentesis in ED on 9/15 with > 9000 neutrophils   - Peritoneal culture +streptococcus constellatus (9/15)  - s/p albumin 25% 50ml completed 9/24  - completed piperacillin-tazobactam 9/24    ________ RENAL__________  #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 Oshea following  - continue octreotide and midodrine for HRS (9/21)    _________MSK___________  Generalized weakness  No focal deficit     __________ID____________  #bacteremia  - coagulase negative staph on one set of blood cultures  - peritoneal cultures Strep Constellatus  - afebrile, no leukocytosis,  - completed piperacillin/tazobactam 3.375g 9/24  - f/u blood cultures sent 9/23    _________ENDO__________  #no active issues    _______HEME/ONC_______  #microcytic Anemia  - likely multifactorial, in the setting of chronic illness and poor PO intake (nutritional deficiencies)  - continue to trend h/h  - continue to assess for signs of bleeding     _________SKIN____________  -Multiple area of ecchymosis on UE  - fragile skin     ________Prophylaxis_______  #DVT   -SCDs, chemical ppx contraindicated  #GI  -pantoprazole 40mg IVP daily     ________GOC/ DISPO_______  - maintain in ICU    - Continue GOC discussion with family  - Full Code 83 year old  Man  from home, ambulates with walker with  hypothyroidism, alcoholic cirrhosis, duodenal ulcers, AVM's, CKD, GBS, 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 As per wife he has no  cough, runny nose. Patient has been having black stools for the past 2 days. Pt noted to have increased secretions, tachycardia, tachypnea and AHRF with increased WOB. Admitted to ICU for AHRF requiring intubation for airway protection.     # Acute Hypoxic Resp failure   # AMS / Encephalopathy   # Hypotension   # KAY on CKD with possibility for Hepatorenal syndrome   # Alcohol inappropriate use   # Liver cirrhosis   # SBP  # Sepsis       _________CNS___________  -Pt a&ox3 at baseline  -currently intubated, currently off all sedation since 9/18, with poor neuro exam as above  - CTH 9/23 NEG  - Palliative Following   - on-going GOC discussions with the family (patient was previously trached for Guillian-[place] syndrome)    _________CVS___________  #Sepsis   -secondary to Spontaneous Bacterial Peritonitis  (confirmed on 9/15 with Diagnostic Para > 9000 Neutrophils)  - Peritoneal culture +streptococcus constellatus (9/15), blood cultures +staphylococcus capitis (9/15)  - completed piperacillin-tazobactam 9/24  - f/u repeat blood cultures 9/23    _________ RESP__________  #AHRF   - intubated on 9/16 for Increased work of breathing  - CT A/P/C: basilar atelectasis   - mechanically ventilated off all sedation   - ventilator settings minimal but mental status too poor for extubation    __________GI____________  #Alcoholic Cirrhosis  - multiple hospitalization for encephalopathy    - on spironolactone and lactulose at home   - hypotensive likely due to splanchnic vasodilation- off vasopressors 9/21, continue albumin 25% 50ml x2     - CTA/P showing ascites   - will hold spironolactone for now  - s/p diagnostic paracentesis 9/15- confirmed SBP         - piperacillin-tazobactam 3.375mg completed 9/24  - c/w octreotide and midodrine for HRS (9/21)    #SBP  - confirmed- s/p diagnostic paracentesis in ED on 9/15 with > 9000 neutrophils   - Peritoneal culture +streptococcus constellatus (9/15)  - s/p albumin 25% 50ml completed 9/24  - completed piperacillin-tazobactam 9/24    ________ RENAL__________  #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 Oshea following  - continue octreotide and midodrine for HRS (9/21)    _________MSK___________  Generalized weakness  No focal deficit     __________ID____________  #bacteremia  - coagulase negative staph on one set of blood cultures  - peritoneal cultures Strep Constellatus  - afebrile, no leukocytosis,  - completed piperacillin/tazobactam 3.375g 9/24  - f/u blood cultures sent 9/23    _________ENDO__________  #no active issues    _______HEME/ONC_______  #microcytic Anemia  - likely multifactorial, in the setting of chronic illness and poor PO intake (nutritional deficiencies)  - continue to trend h/h  - continue to assess for signs of bleeding     _________SKIN____________  -Multiple area of ecchymosis on UE  - fragile skin     ________Prophylaxis_______  #DVT   -SCDs, chemical ppx contraindicated  #GI  -pantoprazole 40mg IVP daily     ________GOC/ DISPO_______  - ICU    - Continue GOC discussion with family  - Full Code

## 2023-09-24 NOTE — PROCEDURE NOTE - NSINFORMCONSENT_GEN_A_CORE
Benefits, risks, and possible complications of procedure explained to patient/caregiver who verbalized understanding and gave verbal consent.
Benefits, risks, and possible complications of procedure explained to patient/caregiver who verbalized understanding and gave written consent.
Benefits, risks, and possible complications of procedure explained to patient/caregiver who verbalized understanding and gave verbal consent.
Spouse Shannon Florez/Benefits, risks, and possible complications of procedure explained to patient/caregiver who verbalized understanding and gave verbal consent.

## 2023-09-24 NOTE — PROCEDURE NOTE - ADDITIONAL PROCEDURE DETAILS
50 cc removed for diagnostic purposes: cell count with differential, culture innoculated at bedside, protein and albumin
Catheter may remain in place up to 29 days.

## 2023-09-25 NOTE — CONSULT NOTE ADULT - SUBJECTIVE AND OBJECTIVE BOX
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      Liver cirrhosis  alcoholic/WALL cirrhosis c/b variceal bleed s/p banding (8/2018) and hepatic encephalopathy (several years ago)      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)      Pancreatitis  2/2 choledocholithiasis s/p ERCP with stone extraction and plastic stent placement (11/2019, stent now removed)      Hematochezia  2/2 colonic AVMs s/p cauterization and hemorrhoids (11/2019)      Melena  2/2 duodenal ulcers s/p argon plasma coagulation (4/2020)      Chronic kidney disease (CKD)      GERD (gastroesophageal reflux disease)      Hypothyroidism      H/O inguinal hernia repair      History of repair of hip fracture  R hip      History of hip replacement  10/2020      H/O spinal stenosis          FAMILY HISTORY:  Family history of ovarian cancer (Sibling)          Social Hx:  Nonsmoker, no drug or alcohol use    MEDICATIONS  (STANDING):  albumin human 25% IVPB 50 milliLiter(s) IV Intermittent every 6 hours  chlorhexidine 0.12% Liquid 15 milliLiter(s) Oral Mucosa every 12 hours  chlorhexidine 2% Cloths 1 Application(s) Topical daily  midodrine 5 milliGRAM(s) Oral every 8 hours  octreotide  Infusion 50 MICROgram(s)/Hr (10 mL/Hr) IV Continuous <Continuous>  pantoprazole  Injectable 40 milliGRAM(s) IV Push daily       Allergies    No Known Allergies    Intolerances        ROS: Pertinent positives in HPI, all other ROS were reviewed and are negative.      Vital Signs Last 24 Hrs  T(C): 36 (25 Sep 2023 20:00), Max: 37 (25 Sep 2023 00:13)  T(F): 96.8 (25 Sep 2023 20:00), Max: 98.6 (25 Sep 2023 00:13)  HR: 88 (25 Sep 2023 20:57) (53 - 121)  BP: 127/71 (25 Sep 2023 20:00) (106/64 - 140/76)  BP(mean): 88 (25 Sep 2023 20:00) (75 - 114)  RR: 25 (25 Sep 2023 20:00) (18 - 29)  SpO2: 99% (25 Sep 2023 20:57) (98% - 100%)            Constitutional: awake and alert.  HEENT: PERRLA, EOMI,   Neck: Supple.  Respiratory: Intubated  Breath sounds are clear bilaterally  Cardiovascular: S1 and S2, regular / irregular rhythm  Gastrointestinal: soft, nontender  Extremities:  no edema  Vascular: Carotid Bruit - no  Musculoskeletal: no joint swelling/tenderness, no abnormal movements  Skin: No rashes    Neurological exam:  HF:[place] cannot follow commands;  CN: [place] EOM full by oculocephalic maneuver; left eye blinks less well to threat; Lef hemiplegia; Right moves in response to pain - withdrawl; swelling of feet Neck rigid.                 Sens: Intact responds to pain    Reflexes:brisker on the left  . BJ 2+, BR 2+, KJ 2+, AJ 2+, downgoing toes b/l  Coord:  cannot test   Gait/Balance: /Cannot test    NIHSS:          Labs:                        8.2    13.49 )-----------( 77       ( 25 Sep 2023 10:50 )             23.1     09-25    137  |  107  |  48<H>  ----------------------------<  185<H>  3.7   |  20<L>  |  2.08<H>    Ca    8.9      25 Sep 2023 02:38  Phos  3.0     09-24  Mg     1.9     09-24    TPro  6.1  /  Alb  3.6  /  TBili  2.4<H>  /  DBili  x   /  AST  5<L>  /  ALT  11  /  AlkPhos  70  09-25        PT/INR - ( 25 Sep 2023 02:38 )   PT: 25.7 sec;   INR: 2.31 ratio         PTT - ( 25 Sep 2023 02:38 )  PTT:45.3 sec    Radiology report:  - CT head:    < from: CT Head No Cont (09.23.23 @ 15:06) >  ACC: [id number] EXAM:  CT BRAIN   ORDERED BY: LUCIANO JOHNS     PROCEDURE DATE:  09/23/2023          INTERPRETATION:  CLINICAL INFORMATION: Encephalopathy.    COMPARISON: CT head of 1/20/2022.    PROCEDURE:  Noncontrast CT of the Head was performed from the skull base to the   vertex. Coronal and Sagittal reformats were obtained.    FINDINGS:    There is no acute intracranial hemorrhage, mass effect, midline shift,   extra-axial collection, hydrocephalus, or evidence of acute vascular   territorial infarction. Mild patchy hypodensities within the   periventricular and subcortical white matter, although nonspecific,   likely reflect chronic microvascular disease. Cerebral volume loss   contributes to prominence of the ventricles and sulci.    Partially imaged complete opacification of the right maxillary sinus.   Status post bilateral ocular lens replacement. Partially imaged posterior   cervical fusion hardware. Partially imaged right nasogastric tube.   Calvarium is intact.    IMPRESSION:    No acute intracranial hemorrhage, mass effect, or evidence of acute   vascular territorial infarction. If clinical symptoms persist or worsen,   more sensitive evaluation with brain MRI may be obtained, if no   contraindications exist.    --- End of Report ---            BECKIE RAMOS MD; Attending Radiologist  This document has been electronically signed. Sep 23 2023  3:13PM    < end of copied text >

## 2023-09-25 NOTE — PROGRESS NOTE ADULT - ASSESSMENT
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 As per wife he has no  cough, runny nose. Patient has been having black stools for the past 2 days. Pt noted to have increased secretions, tachycardia, tachypnea and AHRF with increased WOB. Admitted to ICU for AHRF requiring intubation for airway protection.     # Acute Hypoxic Resp failure   # AMS / Encephalopathy   # Hypotension   # KAY on CKD with possibility for Hepatorenal syndrome   # Alcohol inappropriate use   # Liver cirrhosis   # SBP  # Sepsis       _________CNS___________  -Pt a&ox3 at baseline  -currently intubated, currently off all sedation since 9/18, with poor neuro exam as above  - CTH 9/23 NEG  - Palliative Following   - on-going GOC discussions with the family (patient was previously trached for Guillian-[place] syndrome)    _________CVS___________  #Sepsis   -secondary to Spontaneous Bacterial Peritonitis  (confirmed on 9/15 with Diagnostic Para > 9000 Neutrophils)  - Peritoneal culture +streptococcus constellatus (9/15), blood cultures +staphylococcus capitis (9/15)  - completed piperacillin-tazobactam 9/24  - f/u repeat blood cultures 9/23    _________ RESP__________  #AHRF   - intubated on 9/16 for Increased work of breathing  - CT A/P/C: basilar atelectasis   - mechanically ventilated off all sedation   - ventilator settings minimal but mental status too poor for extubation    __________GI____________  #Alcoholic Cirrhosis  - multiple hospitalization for encephalopathy    - on spironolactone and lactulose at home   - hypotensive likely due to splanchnic vasodilation- off vasopressors 9/21, continue albumin 25% 50ml x2     - CTA/P showing ascites   - will hold spironolactone for now  - s/p diagnostic paracentesis 9/15- confirmed SBP         - piperacillin-tazobactam 3.375mg completed 9/24  - c/w octreotide and midodrine for HRS (9/21)    #SBP  - confirmed- s/p diagnostic paracentesis in ED on 9/15 with > 9000 neutrophils   - Peritoneal culture +streptococcus constellatus (9/15)  - s/p albumin 25% 50ml completed 9/24  - completed piperacillin-tazobactam 9/24    ________ RENAL__________  #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 Oshea following  - continue octreotide and midodrine for HRS (9/21)    _________MSK___________  Generalized weakness  No focal deficit     __________ID____________  #bacteremia  - coagulase negative staph on one set of blood cultures  - peritoneal cultures Strep Constellatus  - afebrile, no leukocytosis,  - completed piperacillin/tazobactam 3.375g 9/24  - f/u blood cultures sent 9/23    _________ENDO__________  #no active issues    _______HEME/ONC_______  #microcytic Anemia  - likely multifactorial, in the setting of chronic illness and poor PO intake (nutritional deficiencies)  - continue to trend h/h  - continue to assess for signs of bleeding     _________SKIN____________  -Multiple area of ecchymosis on UE  - fragile skin     ________Prophylaxis_______  #DVT   -SCDs, chemical ppx contraindicated  #GI  -pantoprazole 40mg IVP daily     ________GOC/ DISPO_______  - ICU    - Continue GOC discussion with family  - Full Code 83 year old  Man  from home, ambulates with walker with  hypothyroidism, alcoholic cirrhosis, duodenal ulcers, AVM's, CKD, GBS, 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 As per wife he has no  cough, runny nose. Patient has been having black stools for the past 2 days. Pt noted to have increased secretions, tachycardia, tachypnea and AHRF with increased WOB. Admitted to ICU for AHRF requiring intubation for airway protection.     # Acute Hypoxic Resp failure   # AMS / Encephalopathy   # Hypotension   # KAY on CKD with possibility for Hepatorenal syndrome   # Alcohol inappropriate use   # Liver cirrhosis   # SBP  # Sepsis       _________CNS___________  -Pt a&ox3 at baseline  -currently intubated, currently off all sedation since 9/18, with poor neuro exam as above  - CT 9/23 NEG  - Palliative Following   - on-going GOC discussions with the family (patient was previously trached for Guillian-[place] syndrome)  - Neuro Dr. Paula consulted  - spot EEG    _________CVS___________  #Sepsis   -secondary to Spontaneous Bacterial Peritonitis  (confirmed on 9/15 with Diagnostic Para > 9000 Neutrophils)  - Peritoneal culture +streptococcus constellatus (9/15), blood cultures +staphylococcus capitis (9/15)  - completed piperacillin-tazobactam 9/24  - repeat blood cultures 9/23 NGTD    _________ RESP__________  #AHRF   - intubated on 9/16 for Increased work of breathing  - CT A/P/C: basilar atelectasis   - mechanically ventilated off all sedation   - ventilator settings minimal but mental status too poor for extubation  - SBT today 9/25    __________GI____________  #Alcoholic Cirrhosis  - multiple hospitalization for encephalopathy    - on spironolactone and lactulose at home   - hypotensive likely due to splanchnic vasodilation- off vasopressors 9/21, continue albumin 25% 50ml x2     - CTA/P showing ascites   - will hold spironolactone for now  - s/p diagnostic paracentesis 9/15- confirmed SBP         - piperacillin-tazobactam 3.375mg completed 9/24  - c/w octreotide and midodrine for HRS (9/21)    #SBP  - confirmed- s/p diagnostic paracentesis in ED on 9/15 with > 9000 neutrophils   - Peritoneal culture +streptococcus constellatus (9/15)  - s/p albumin 25% 50ml completed 9/24  - completed piperacillin-tazobactam 9/24    #GI Bleed  BRBPR 9/25  H/H 7.9 > 6.7 > 1U   trend H/H    ________ RENAL__________  #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 Oshea following  - continue octreotide and midodrine for HRS (9/21)    _________MSK___________  Generalized weakness  No focal deficit     __________ID____________  #bacteremia  - coagulase negative staph on one set of blood cultures  - peritoneal cultures Strep Constellatus  - afebrile, no leukocytosis,  - completed piperacillin/tazobactam 3.375g 9/24  - repeat blood cultures 9/23 NGTD    _________ENDO__________  #no active issues    _______HEME/ONC_______  #microcytic Anemia  - likely multifactorial, in the setting of chronic illness and poor PO intake (nutritional deficiencies)  - continue to trend h/h  - continue to assess for signs of bleeding  BRBPR 9/25  H/H 7.9 > 6.7 > 1U      _________SKIN____________  -Multiple area of ecchymosis on UE  - fragile skin     ________Prophylaxis_______  #DVT   -SCDs, chemical ppx contraindicated  #GI  -pantoprazole 40mg IVP daily     ________GOC/ DISPO_______  - ICU    - Continue GOC discussion with family  - Full Code

## 2023-09-25 NOTE — PROGRESS NOTE ADULT - ASSESSMENT
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 Pt a/w septic shock 2/2 SBP/ Bacteremia & decompensated liver cirrhosis. Nephrology consulted for Elevated serum creatinine.  s/p CT abd/ pelvis with IV contrast 9/15 with liver mass, and intraductal mass superior to pancreatic head    1. KAY- shaylee SCr 0.7-1. KAY in the setting of cirrhosis; HRS vs LEA (s/p CT with IV contrast on 9/15) with hypotension. UA trace protein, large blood and mod LE. FeNa 0.35% s/p albumin x 2 days (9/19-9/21) with no renal improvement (less likely pre-renal).    Pt now on tx for HRS with midodrine 5mg PO q8hrs, Octreotide gtt 50mcg/hr and IV albumin. Scr relatively stable with poor UO. If no improvement, can consider changing midodrine/octreotide to levo gtt and keeping MAP > 80.  Check LDH/haptoglobin r/o TMA.  FeNa low. Renal US with no hydro. c/w current tx. Strict I/Os. Avoid nephrotoxins/ NSAIDs/ RCA. Monitor BMP.  Patient with low serum CO2 but with mixed disorder and normal pH. No need for sodium bicarbonate.    2. Septic shock- due to spontaneous bacterial peritonitis and Staph capitis bacteremia. Plan as per ICU/ ID.     3. Hypotension- BP improved. Pt off IV pressors. Currently on Midodrine 5mg PO tid. Monitor BP    4. Cirrhosis - with liver mass. Plan as per Hepatology.        [facility] NEPHR[facility]  Aubrey Nguyen M.D.  Tod Brewer D.O.  Thalia Oshea M.D.  MD Charlene Monae, MSN, ANP-C    Telephone: [phone]  Facsimile: [phone]    [address] [place] [address], #[place] [address]

## 2023-09-25 NOTE — PROGRESS NOTE ADULT - SUBJECTIVE AND OBJECTIVE BOX
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 Pt a/w septic shock 2/2 SBP/ Bacteremia & decompensated liver cirrhosis. Nephrology consulted for Elevated serum creatinine.  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    No Known Allergies      Hospital Medications: Medications reviewed    VITALS:  T(F): 97.7 (09-25-23 @ 07:00), Max: 98.6 (09-24-23 @ 16:00)  HR: 68 (09-25-23 @ 12:02)  BP: 116/63 (09-25-23 @ 12:00)  RR: 26 (09-25-23 @ 12:00)  SpO2: 100% (09-25-23 @ 12:02)  Wt(kg): --    09-24 @ 07:01  -  09-25 @ 07:00  --------------------------------------------------------  IN: 1590 mL / OUT: 750 mL / NET: 840 mL    09-25 @ 07:01  -  09-25 @ 12:52  --------------------------------------------------------  IN: 165 mL / OUT: 75 mL / NET: 90 mL      PHYSICAL EXAM:    Gen: NAD, intubated  Cards: RRR, +S1/S2, no M/G/R  Resp: course BS B/L  GI: soft, NT, + ab distention, NABS  : + rodriguez  Vascular:  + anasarca      LABS:  09-25    137  |  107  |  48<H>  ----------------------------<  185<H>  3.7   |  20<L>  |  2.08<H>    Ca    8.9      25 Sep 2023 02:38  Phos  3.0     09-24  Mg     1.9     09-24    TPro  6.1  /  Alb  3.6  /  TBili  2.4<H>  /  DBili      /  AST  5<L>  /  ALT  11  /  AlkPhos  70  09-25    Creatinine Trend: 2.08 <--, 2.00 <--, 2.12 <--, 2.08 <--, 2.02 <--, 1.94 <--, 1.93 <--, 1.94 <--                        8.2    13.49 )-----------( 77       ( 25 Sep 2023 10:50 )             23.1     Urine Studies:  Urinalysis Basic - ( 25 Sep 2023 02:38 )    Color:  / Appearance:  / SG:  / pH:   Gluc: 185 mg/dL / Ketone:   / Bili:  / Urobili:    Blood:  / Protein:  / Nitrite:    Leuk Esterase:  / RBC:  / WBC    Sq Epi:  / Non Sq Epi:  / Bacteria:       Sodium, Random Urine: <5 mmol/L (09-23 @ 14:26)  Osmolality, Random Urine: 373 mos/kg (09-23 @ 14:26)  Potassium, Random Urine: 40 mmol/L (09-23 @ 14:26)  Creatinine, Random Urine: 71 mg/dL (09-23 @ 14:26)

## 2023-09-25 NOTE — PROGRESS NOTE ADULT - SUBJECTIVE AND OBJECTIVE BOX
Chief Complaint:  Patient is a 83y old  Male who presents with a chief complaint of Sepsis (25 Sep 2023 12:52)      Reason for consult: ACLF    Interval Events: Patient was seen and examined at bedside. Daughters were at bedside. Patient remained to have poor mental status, not following commands, attempts to open eyes for verbal loud stimuli after several try. Remains on HRS mgmt. w/o improvement in Cr so far, urine output 25 cc /hr. CT head was unremarkable. Pending EEG and neurology evaluation.     Hospital Medications:  acetaminophen     Tablet .. 650 milliGRAM(s) Oral every 6 hours PRN  albumin human 25% IVPB 50 milliLiter(s) IV Intermittent every 6 hours  chlorhexidine 0.12% Liquid 15 milliLiter(s) Oral Mucosa every 12 hours  chlorhexidine 2% Cloths 1 Application(s) Topical daily  midodrine 5 milliGRAM(s) Oral every 8 hours  octreotide  Infusion 50 MICROgram(s)/Hr IV Continuous <Continuous>  pantoprazole  Injectable 40 milliGRAM(s) IV Push daily  sodium chloride 0.9% lock flush 10 milliLiter(s) IV Push every 1 hour PRN      ROS:   Unable to obtain due to patient condition.     PHYSICAL EXAM:   Vital Signs:  Vital Signs Last 24 Hrs  T(C): 36.5 (25 Sep 2023 07:00), Max: 37 (24 Sep 2023 16:00)  T(F): 97.7 (25 Sep 2023 07:00), Max: 98.6 (24 Sep 2023 16:00)  HR: 65 (25 Sep 2023 13:00) (53 - 83)  BP: 120/64 (25 Sep 2023 13:00) (101/63 - 124/61)  BP(mean): 82 (25 Sep 2023 13:00) (67 - 89)  RR: 24 (25 Sep 2023 13:00) (17 - 29)  SpO2: 100% (25 Sep 2023 13:00) (98% - 100%)      Daily     Daily Weight in k.2 (25 Sep 2023 07:00)    GENERAL: ill appearing   NEURO: not following commands, attempts to open eyes for verbal loud stimuli after several try.  HEENT: icteric sclera  CHEST: intubated, on vent  CARDIAC: regular rate, rhythm  ABDOMEN: soft, non-tender, mildly distended, no rebound or guarding, BS+, rectal tube in place  EXTREMITIES: b/l LE edema  SKIN: no rash    LABS: reviewed                        8.2    13.49 )-----------( 77       ( 25 Sep 2023 10:50 )             23.1         137  |  107  |  48<H>  ----------------------------<  185<H>  3.7   |  20<L>  |  2.08<H>    Ca    8.9      25 Sep 2023 02:38  Phos  3.0     -  Mg     1.9         TPro  6.1  /  Alb  3.6  /  TBili  2.4<H>  /  DBili  x   /  AST  5<L>  /  ALT  11  /  AlkPhos  70      LIVER FUNCTIONS - ( 25 Sep 2023 02:38 )  Alb: 3.6 g/dL / Pro: 6.1 g/dL / ALK PHOS: 70 U/L / ALT: 11 U/L DA / AST: 5 U/L / GGT: x             Interval Diagnostic Studies: see sunrise for full report

## 2023-09-25 NOTE — PROGRESS NOTE ADULT - SUBJECTIVE AND OBJECTIVE BOX
INTERVAL HPI/OVERNIGHT EVENTS: ***    PRESSORS: [ ] YES [ ] NO  WHICH:    Antimicrobial:    Cardiovascular:  midodrine 5 milliGRAM(s) Oral every 8 hours    Pulmonary:    Hematalogic:    Other:  acetaminophen     Tablet .. 650 milliGRAM(s) Oral every 6 hours PRN  albumin human 25% IVPB 50 milliLiter(s) IV Intermittent every 6 hours  chlorhexidine 0.12% Liquid 15 milliLiter(s) Oral Mucosa every 12 hours  chlorhexidine 2% Cloths 1 Application(s) Topical daily  octreotide  Infusion 50 MICROgram(s)/Hr IV Continuous <Continuous>  pantoprazole  Injectable 40 milliGRAM(s) IV Push daily  sodium chloride 0.9% lock flush 10 milliLiter(s) IV Push every 1 hour PRN    acetaminophen     Tablet .. 650 milliGRAM(s) Oral every 6 hours PRN  albumin human 25% IVPB 50 milliLiter(s) IV Intermittent every 6 hours  chlorhexidine 0.12% Liquid 15 milliLiter(s) Oral Mucosa every 12 hours  chlorhexidine 2% Cloths 1 Application(s) Topical daily  midodrine 5 milliGRAM(s) Oral every 8 hours  octreotide  Infusion 50 MICROgram(s)/Hr IV Continuous <Continuous>  pantoprazole  Injectable 40 milliGRAM(s) IV Push daily  sodium chloride 0.9% lock flush 10 milliLiter(s) IV Push every 1 hour PRN    Drug Dosing Weight  Height (cm): 170.2 (15 Sep 2023 11:37)  Weight (kg): 64.7 (16 Sep 2023 16:30)  BMI (kg/m2): 22.3 (16 Sep 2023 16:30)  BSA (m2): 1.75 (16 Sep 2023 16:30)    CENTRAL LINE: [ ] YES [ ] NO  LOCATION:   DATE INSERTED:  REMOVE: [ ] YES [ ] NO  EXPLAIN:    REYNOLDS: [ ] YES [ ] NO    DATE INSERTED:  REMOVE:  [ ] YES [ ] NO  EXPLAIN:    A-LINE:  [ ] YES [ ] NO  LOCATION:   DATE INSERTED:  REMOVE:  [ ] YES [ ] NO  EXPLAIN:    PMH -reviewed admission note, no change since admission  PAST MEDICAL & SURGICAL HISTORY:  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      Liver cirrhosis  alcoholic/WALL cirrhosis c/b variceal bleed s/p banding (8/2018) and hepatic encephalopathy (several years ago)      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)      Pancreatitis  2/2 choledocholithiasis s/p ERCP with stone extraction and plastic stent placement (11/2019, stent now removed)      Hematochezia  2/2 colonic AVMs s/p cauterization and hemorrhoids (11/2019)      Melena  2/2 duodenal ulcers s/p argon plasma coagulation (4/2020)      Chronic kidney disease (CKD)      GERD (gastroesophageal reflux disease)      Hypothyroidism      H/O inguinal hernia repair      History of repair of hip fracture  R hip      History of hip replacement  10/2020      H/O spinal stenosis          ICU Vital Signs Last 24 Hrs  T(C): 36.7 (25 Sep 2023 04:00), Max: 37 (24 Sep 2023 16:00)  T(F): 98 (25 Sep 2023 04:00), Max: 98.6 (24 Sep 2023 16:00)  HR: 70 (25 Sep 2023 06:00) (53 - 83)  BP: 116/62 (25 Sep 2023 06:00) (101/63 - 120/63)  BP(mean): 79 (25 Sep 2023 06:00) (67 - 89)  ABP: --  ABP(mean): --  RR: 24 (25 Sep 2023 06:00) (17 - 29)  SpO2: 100% (25 Sep 2023 06:00) (98% - 100%)        ABG - ( 24 Sep 2023 03:04 )  pH, Arterial: 7.45  pH, Blood: x     /  pCO2: 23    /  pO2: 146   / HCO3: 16    / Base Excess: -6.0  /  SaO2: 100                   09-24 @ 07:01  -  09-25 @ 07:00  --------------------------------------------------------  IN: 1535 mL / OUT: 725 mL / NET: 810 mL        Mode: AC/ CMV (Assist Control/ Continuous Mandatory Ventilation)  RR (machine): 16  TV (machine): 400  FiO2: 35  PEEP: 5  ITime: 1  MAP: 12  PIP: 33      PHYSICAL EXAM:    GENERAL: NAD, well-groomed, well-developed  HEAD:  Atraumatic, Normocephalic  EYES: EOMI, PERRLA, conjunctiva and sclera clear  ENMT: No tonsillar erythema, exudates, or enlargement; Moist mucous membranes, Good dentition, No lesions  NECK: Supple, normal appearance, No JVD; Normal thyroid; Trachea midline  NERVOUS SYSTEM:  Alert & Oriented X3,  Motor Strength 5/5 B/L upper and lower extremities; DTRs 2+ intact and symmetric  CHEST/LUNG: No chest deformity; Normal percussion bilaterally; No rales, rhonchi, wheezing   HEART: Regular rate and rhythm; No murmurs, rubs, or gallops  ABDOMEN: Soft, Nontender, Nondistended; Bowel sounds present  EXTREMITIES:  2+ Peripheral Pulses, No clubbing, cyanosis, or edema  LYMPH: No lymphadenopathy noted  SKIN: No rashes or lesions;  Good capillary refill      LABS:  CBC Full  -  ( 25 Sep 2023 02:38 )  WBC Count : 11.62 K/uL  RBC Count : 2.44 M/uL  Hemoglobin : 6.7 g/dL  Hematocrit : 19.1 %  Platelet Count - Automated : 72 K/uL  Mean Cell Volume : 78.3 fl  Mean Cell Hemoglobin : 27.5 pg  Mean Cell Hemoglobin Concentration : 35.1 gm/dL  Auto Neutrophil # : 9.55 K/uL  Auto Lymphocyte # : 0.69 K/uL  Auto Monocyte # : 0.91 K/uL  Auto Eosinophil # : 0.22 K/uL  Auto Basophil # : 0.02 K/uL  Auto Neutrophil % : 82.2 %  Auto Lymphocyte % : 5.9 %  Auto Monocyte % : 7.8 %  Auto Eosinophil % : 1.9 %  Auto Basophil % : 0.2 %    09-25    137  |  107  |  48<H>  ----------------------------<  185<H>  3.7   |  20<L>  |  2.08<H>    Ca    8.9      25 Sep 2023 02:38  Phos  3.0     09-24  Mg     1.9     09-24    TPro  6.1  /  Alb  3.6  /  TBili  2.4<H>  /  DBili  x   /  AST  5<L>  /  ALT  11  /  AlkPhos  70  09-25    PT/INR - ( 25 Sep 2023 02:38 )   PT: 25.7 sec;   INR: 2.31 ratio         PTT - ( 25 Sep 2023 02:38 )  PTT:45.3 sec  Urinalysis Basic - ( 25 Sep 2023 02:38 )    Color: x / Appearance: x / SG: x / pH: x  Gluc: 185 mg/dL / Ketone: x  / Bili: x / Urobili: x   Blood: x / Protein: x / Nitrite: x   Leuk Esterase: x / RBC: x / WBC x   Sq Epi: x / Non Sq Epi: x / Bacteria: x      Culture Results:   No growth at 24 hours (09-23 @ 18:49)  Culture Results:   No growth at 24 hours (09-23 @ 18:30)      RADIOLOGY & ADDITIONAL STUDIES REVIEWED:  ***    [ ]GOALS OF CARE DISCUSSION WITH PATIENT/FAMILY/PROXY:    CRITICAL CARE TIME SPENT: 35 minutes INTERVAL HPI/OVERNIGHT EVENTS: Overnight, pt had 1 episode of BRBPR. Hb noted to have downtrended to 6.7, pt given 1U PRBC. Pt seen at bedside, intubated, off sedation, AAOx0, unable to follow commands. Unable to assess ROS due to mental status    PRESSORS: [ ] YES [x] NO  WHICH:    Antimicrobial:    Cardiovascular:  midodrine 5 milliGRAM(s) Oral every 8 hours    Pulmonary:    Hematalogic:    Other:  acetaminophen     Tablet .. 650 milliGRAM(s) Oral every 6 hours PRN  albumin human 25% IVPB 50 milliLiter(s) IV Intermittent every 6 hours  chlorhexidine 0.12% Liquid 15 milliLiter(s) Oral Mucosa every 12 hours  chlorhexidine 2% Cloths 1 Application(s) Topical daily  octreotide  Infusion 50 MICROgram(s)/Hr IV Continuous <Continuous>  pantoprazole  Injectable 40 milliGRAM(s) IV Push daily  sodium chloride 0.9% lock flush 10 milliLiter(s) IV Push every 1 hour PRN    acetaminophen     Tablet .. 650 milliGRAM(s) Oral every 6 hours PRN  albumin human 25% IVPB 50 milliLiter(s) IV Intermittent every 6 hours  chlorhexidine 0.12% Liquid 15 milliLiter(s) Oral Mucosa every 12 hours  chlorhexidine 2% Cloths 1 Application(s) Topical daily  midodrine 5 milliGRAM(s) Oral every 8 hours  octreotide  Infusion 50 MICROgram(s)/Hr IV Continuous <Continuous>  pantoprazole  Injectable 40 milliGRAM(s) IV Push daily  sodium chloride 0.9% lock flush 10 milliLiter(s) IV Push every 1 hour PRN    Drug Dosing Weight  Height (cm): 170.2 (15 Sep 2023 11:37)  Weight (kg): 64.7 (16 Sep 2023 16:30)  BMI (kg/m2): 22.3 (16 Sep 2023 16:30)  BSA (m2): 1.75 (16 Sep 2023 16:30)    CENTRAL LINE: [ ] YES [ ] NO  LOCATION:   DATE INSERTED:  REMOVE: [ ] YES [ ] NO  EXPLAIN:    REYNOLDS: [ ] YES [ ] NO    DATE INSERTED:  REMOVE:  [ ] YES [ ] NO  EXPLAIN:    A-LINE:  [ ] YES [ ] NO  LOCATION:   DATE INSERTED:  REMOVE:  [ ] YES [ ] NO  EXPLAIN:    PMH -reviewed admission note, no change since admission  PAST MEDICAL & SURGICAL HISTORY:  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      Liver cirrhosis  alcoholic/WALL cirrhosis c/b variceal bleed s/p banding (8/2018) and hepatic encephalopathy (several years ago)      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)      Pancreatitis  2/2 choledocholithiasis s/p ERCP with stone extraction and plastic stent placement (11/2019, stent now removed)      Hematochezia  2/2 colonic AVMs s/p cauterization and hemorrhoids (11/2019)      Melena  2/2 duodenal ulcers s/p argon plasma coagulation (4/2020)      Chronic kidney disease (CKD)      GERD (gastroesophageal reflux disease)      Hypothyroidism      H/O inguinal hernia repair      History of repair of hip fracture  R hip      History of hip replacement  10/2020      H/O spinal stenosis          ICU Vital Signs Last 24 Hrs  T(C): 36.7 (25 Sep 2023 04:00), Max: 37 (24 Sep 2023 16:00)  T(F): 98 (25 Sep 2023 04:00), Max: 98.6 (24 Sep 2023 16:00)  HR: 70 (25 Sep 2023 06:00) (53 - 83)  BP: 116/62 (25 Sep 2023 06:00) (101/63 - 120/63)  BP(mean): 79 (25 Sep 2023 06:00) (67 - 89)  ABP: --  ABP(mean): --  RR: 24 (25 Sep 2023 06:00) (17 - 29)  SpO2: 100% (25 Sep 2023 06:00) (98% - 100%)        ABG - ( 24 Sep 2023 03:04 )  pH, Arterial: 7.45  pH, Blood: x     /  pCO2: 23    /  pO2: 146   / HCO3: 16    / Base Excess: -6.0  /  SaO2: 100                   09-24 @ 07:01  -  09-25 @ 07:00  --------------------------------------------------------  IN: 1535 mL / OUT: 725 mL / NET: 810 mL        Mode: AC/ CMV (Assist Control/ Continuous Mandatory Ventilation)  RR (machine): 16  TV (machine): 400  FiO2: 35  PEEP: 5  ITime: 1  MAP: 12  PIP: 33      PHYSICAL EXAM:    GENERAL: NAD, minimally responsive to voice, AAOX0, not following commands  HEAD:  Atraumatic, Normocephalic  EYES: EOMI, PERRLA, 3mm and brisk, conjunctiva and sclera clear  NECK: Supple, normal appearance, No JVD; Normal thyroid; Trachea midline  NERVOUS SYSTEM:  Alert & Oriented X0, no effort to move noted, limited passive ROM  CHEST/LUNG: diminished to auscultation bilaterally; No rales, rhonchi, wheezing   HEART: Regular rate and rhythm; No murmurs, rubs, or gallops  ABDOMEN: Distended, soft, hypoactive bowel sounds  EXTREMITIES:  2+ Peripheral Pulses, No clubbing, cyanosis, +2 generalized edema  SKIN: No rashes or lesions;  brisk capillary refill, UE ecchymosis noted, weeping upper extremities      LABS:  CBC Full  -  ( 25 Sep 2023 02:38 )  WBC Count : 11.62 K/uL  RBC Count : 2.44 M/uL  Hemoglobin : 6.7 g/dL  Hematocrit : 19.1 %  Platelet Count - Automated : 72 K/uL  Mean Cell Volume : 78.3 fl  Mean Cell Hemoglobin : 27.5 pg  Mean Cell Hemoglobin Concentration : 35.1 gm/dL  Auto Neutrophil # : 9.55 K/uL  Auto Lymphocyte # : 0.69 K/uL  Auto Monocyte # : 0.91 K/uL  Auto Eosinophil # : 0.22 K/uL  Auto Basophil # : 0.02 K/uL  Auto Neutrophil % : 82.2 %  Auto Lymphocyte % : 5.9 %  Auto Monocyte % : 7.8 %  Auto Eosinophil % : 1.9 %  Auto Basophil % : 0.2 %    09-25    137  |  107  |  48<H>  ----------------------------<  185<H>  3.7   |  20<L>  |  2.08<H>    Ca    8.9      25 Sep 2023 02:38  Phos  3.0     09-24  Mg     1.9     09-24    TPro  6.1  /  Alb  3.6  /  TBili  2.4<H>  /  DBili  x   /  AST  5<L>  /  ALT  11  /  AlkPhos  70  09-25    PT/INR - ( 25 Sep 2023 02:38 )   PT: 25.7 sec;   INR: 2.31 ratio         PTT - ( 25 Sep 2023 02:38 )  PTT:45.3 sec  Urinalysis Basic - ( 25 Sep 2023 02:38 )    Color: x / Appearance: x / SG: x / pH: x  Gluc: 185 mg/dL / Ketone: x  / Bili: x / Urobili: x   Blood: x / Protein: x / Nitrite: x   Leuk Esterase: x / RBC: x / WBC x   Sq Epi: x / Non Sq Epi: x / Bacteria: x      Culture Results:   No growth at 24 hours (09-23 @ 18:49)  Culture Results:   No growth at 24 hours (09-23 @ 18:30)      RADIOLOGY & ADDITIONAL STUDIES REVIEWED:  ***    [ ]GOALS OF CARE DISCUSSION WITH PATIENT/FAMILY/PROXY:    CRITICAL CARE TIME SPENT: 35 minutes

## 2023-09-25 NOTE — CONSULT NOTE ADULT - ASSESSMENT
Likely new left hemiplegia due to new cerebral infarct; recommend repeat CT head and follow; will check EEG result from today

## 2023-09-25 NOTE — PROGRESS NOTE ADULT - ASSESSMENT
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  Hepatology consulted for ACLF and been following since 9/18.   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  Patient remained to have poor mental status, not following commands, attempts to open eyes for verbal loud stimuli after several try. Remains on HRS mgmt. w/o improvement in Cr so far, urine output 25 cc /hr.       Decompensated cirrhosis  Ascites c/b SBP  KAY vs. KAY on CKD  Liver lesion  Biliary dilation  MELD 3.0 24- > 26    KAY vs. KAY on CKD  - Got 25%  albumin on 9/16 400 ml and 9/18 250 ml.  and was started on 1g/bwkg/day albumin on 9/19/23  - Has not responded to 25% albumin, Cr remained ~2, thus was started on full HRS mgmt. 9/21/23  --- Monitor I/O  --- C/w supportive measures per ICU, keep MAP > 75 mmHg  --- C/w 25 % albumin   --- C/w HRS mgmt., c/w octreotide (usually 100 mcg sc. tid and increase to 200 mcg tid if no response), and midodrine (and keep MAP > 75mmHg)  --- Repeat urine studies noted, Kathia <5  --- Nephrology consult appreciated, f/u recs.   --- Avoid NSAIDs  --- Hold off with diuretics  --- Notably, terlipressin available in select cases at [facility], to d/w nephrology    SBP  - BCx 9/15 one set neg, one set w/ Staph. capitis  - Ascites - rare Step. constellatus  --- C/w antibiotic coverage per ID. S/p Zosyn 9/15-9/23. Please, c/w secondary SBP prophylaxis.    --- Consider repeat Dx paracentesis - Was d/w ICU, did not have suitable pocket. Repeat bedside US if worsening abdominal distention.     Liver lesion  - AFP, CEA, CA 19-9 normal  --- Once acute issues resolved, will need MRI abd w/wo for better characterization, given the other intraductal mass, likely will also need tissue sampling and metastatic workup (all once acute issues resolved) Discussed w/ daughters.       Anemia, required PRBC 1 U 9/23 and 1U today  Hx of varices, Hx of GIB  - No reported overt bleeding, monitor for bleeding  --- Keep Hb >7, and if bleeding PLT >50, fibrinogen > 120  --- Was on GRN coverage, and PPI already, plus on octreotide b/o suspected HRS  --- Completed Zosyn, still c/w SBP prophylaxis (secondary b/o SBP episode and b/o worsening anemia, concern for occult bleeding?)  --- Consider GI consult    Altered mental status   - Ammonia was 18 and < 10 on 9/15 and 9/20, respectively  --- CT head unremarkable  --- C/w lactulose and titrate to have 2-3 BM/day.    --- Please, add on rifaximin.   --- Pending EEG and neurology evaluation.  --- Can consider adding zinc.     Poor prognosis - Ongoing GOC discussions. Had extensive discussion with the patients daughters    Thank you for consult  Will continue to monitor.  D/w ICU

## 2023-09-25 NOTE — EEG REPORT - NS EEG TEXT BOX
REPORT OF ROUTINE EEG WITH VIDEO    [facility]: [address], [address] [place] [address], Phone: [phone]  [facility]: [address] [place] [address], [place] [address], Phone: [phone]  Office: [address] [place] [address], [place] [address], [place] [address], Phone: [phone]    Patient Name: KETAN TIPTON    Age: 83 year  : 1940  MRN #: -, Location: -  Referring Physician: CHRISTINE    EEG #: [id number]  Study Date: 2023   Start Time: 3:02:15 PM     Study Duration: 27 min  		    Technical Information:					  On Instrument: [id number]  Placement and Labeling of Electrodes:  The EEG was performed utilizing 20 channels referential EEG connections (coronal over temporal over parasagittal montage) using all standard 10-20 electrode placements with EKG.  Recording was at a sampling rate of 256 samples per second per channel.  Time synchronized digital video recording was done simultaneously with EEG recording.  A low light infrared camera was used for low light recording.  Clayton and seizure detection algorithms were utilized.  CSA Technical Component:  Quantitative EEG analysis using a separate Compressed Spectral Array (CSA) software package was conducted in real-time and run at bedside after set up by the technician, digitally displaying the power of electrographic frequencies included in the 1-30Hz band using a graded color map.  This data was reviewed and interpreted independently, and is reported in a separate section below.    History:  -Patient is a 83y old  Male who presents with a chief complaint of Sepsis (25 Sep 2023 12:52)    Medication  No Data.    Study Interpretation:    FINDINGS:  The background was continuous, spontaneously variable and reactive.  There was diffuse delta activity, with no PDR.    Background Slowing:  Generalized slowing: As above  Focal slowing: none was present.    Sleep Background:  -Drowsiness was characterized by fragmentation, attenuation, and slowing of the background activity.    -Stage II sleep transients were not recorded.    Non-epileptiform activity:  Quasiperiodic frontal maximal delta with triphasic morphology near 0,5hz at times    Epileptiform Activity:   No epileptiform discharges were present.    Events:  No clinical events were recorded.  No seizures were recorded.    Activation Procedures:   -Hyperventilation was not performed.    -Photic stimulation was performed and did not elicit any abnormalities.      Artifacts:  Intermittent myogenic and movement artifacts were noted.    ECG:  The heart rate on single channel ECG was predominantly between 60-70 BPM.    EEG Classification / Summary:    Abnormal EEG in the comatose patient.  Quasiperiodic frontal maximal delta with triphasic morphology near 0,5hz at times  Moderate diffuse cerebral slowing    Clinical Impression:  Moderate to severe  nonspecific diffuse or multifocal cerebral dysfunction.   No seizures    ________________________________________    Melani Hernadez  Fellow, [facility]    Dayday Méndez M.D.  Attending Physician, [facility]  		  ------------------------------------  EEG Reading Room: [phone]  On Call Service After Hours: [phone]

## 2023-09-26 NOTE — PROGRESS NOTE ADULT - ASSESSMENT
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 As per wife he has no  cough, runny nose. Patient has been having black stools for the past 2 days. Pt noted to have increased secretions, tachycardia, tachypnea and AHRF with increased WOB. Admitted to ICU for AHRF requiring intubation for airway protection.     # Acute Hypoxic Resp failure   # AMS / Encephalopathy   # Hypotension   # KAY on CKD with possibility for Hepatorenal syndrome   # Alcohol inappropriate use   # Liver cirrhosis   # SBP  # Sepsis       _________CNS___________  -Pt a&ox3 at baseline  -currently intubated, currently off all sedation since 9/18, with poor neuro exam as above  - CTH 9/23 NEG  - Palliative Following   - on-going GOC discussions with the family (patient was previously trached for Guillian-Peck syndrome)  - Neuro Dr. Paula consulted  - spot EEG 9/25: Moderate to severe  nonspecific diffuse or multifocal cerebral dysfunction. No seizures  - f/u repeat CTH as per Neuro    _________CVS___________  #Sepsis   -secondary to Spontaneous Bacterial Peritonitis  (confirmed on 9/15 with Diagnostic Para > 9000 Neutrophils)  - Peritoneal culture +streptococcus constellatus (9/15), blood cultures +staphylococcus capitis (9/15)  - completed piperacillin-tazobactam 9/24  - repeat blood cultures 9/23 NGTD    _________ RESP__________  #AHRF   - intubated on 9/16 for Increased work of breathing  - CT A/P/C: basilar atelectasis   - mechanically ventilated off all sedation   - ventilator settings minimal but mental status too poor for extubation  - SBT today 9/25    __________GI____________  #Alcoholic Cirrhosis  - multiple hospitalization for encephalopathy    - on spironolactone and lactulose at home   - hypotensive likely due to splanchnic vasodilation- off vasopressors 9/21, continue albumin 25% 50ml x2     - CTA/P showing ascites   - will hold spironolactone for now  - s/p diagnostic paracentesis 9/15- confirmed SBP         - piperacillin-tazobactam 3.375mg completed 9/24  - c/w octreotide and midodrine for HRS (9/21)  - rifaximin added (9/26)    #SBP  - confirmed- s/p diagnostic paracentesis in ED on 9/15 with > 9000 neutrophils   - Peritoneal culture +streptococcus constellatus (9/15)  - s/p albumin 25% 50ml completed 9/24  - completed piperacillin-tazobactam 9/24    #GI Bleed  BRBPR 9/25  H/H 7.9 > 6.7 > 1U > 8.2 > 7.9 (9/26)  trend H/H    ________ RENAL__________  #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 Oshea following  - continue octreotide and midodrine for HRS (9/21)    _________MSK___________  Generalized weakness  No focal deficit     __________ID____________  #bacteremia  - coagulase negative staph on one set of blood cultures  - peritoneal cultures Strep Constellatus  - afebrile, no leukocytosis,  - completed piperacillin/tazobactam 3.375g 9/24  - repeat blood cultures 9/23 NGTD    _________ENDO__________  #no active issues    _______HEME/ONC_______  #microcytic Anemia  - likely multifactorial, in the setting of chronic illness and poor PO intake (nutritional deficiencies)  - continue to trend h/h  - continue to assess for signs of bleeding  BRBPR 9/25  H/H 7.9 > 6.7 > 1U >8.2 > 7.9    _________SKIN____________  -Multiple area of ecchymosis on UE  - fragile skin     ________Prophylaxis_______  #DVT   -SCDs, chemical ppx contraindicated  #GI  -pantoprazole 40mg IVP daily     ________GOC/ DISPO_______  - ICU    - Continue GOC discussion with family  - Full Code

## 2023-09-26 NOTE — PROGRESS NOTE ADULT - ASSESSMENT
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  Hepatology consulted for ACLF and been following since 9/18.   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  Patient remained to have poor mental status, not following commands, attempts to open eyes for verbal loud stimuli after several try. Remains on HRS mgmt. w/o improvement in Cr so far, urine output 25 cc /hr.       Decompensated cirrhosis  Ascites c/b SBP  KAY vs. KAY on CKD  Liver lesion  Biliary dilation  MELD 3.0 24- > 26    KAY vs. KAY on CKD  - Got 25%  albumin on 9/16 400 ml and 9/18 250 ml.  and was started on 1g/bwkg/day albumin on 9/19/23  - Has not responded to 25% albumin, Cr remained ~2, thus was started on full HRS mgmt. 9/21/23  --- Monitor I/O  --- C/w supportive measures per ICU, keep MAP > 75 mmHg  --- C/w 25 % albumin   --- C/w HRS mgmt., c/w octreotide (usually 100 mcg sc. tid and increase to 200 mcg tid if no response), and midodrine (and keep MAP > 75mmHg)  --- Repeat urine studies noted, Kathia <5  --- Nephrology consult appreciated, f/u recs.   --- Avoid NSAIDs  --- Hold off with diuretics      SBP  - BCx 9/15 one set neg, one set w/ Staph. capitis  - Ascites - rare Step. constellatus  --- C/w antibiotic coverage per ID. S/p Zosyn 9/15-9/23. Please, c/w secondary SBP prophylaxis.    --- Consider repeat Dx paracentesis - Was d/w ICU, did not have suitable pocket. Repeat bedside US if worsening abdominal distention.     Liver lesion  - AFP, CEA, CA 19-9 normal  --- Once acute issues resolved, will need MRI abd w/wo for better characterization, given the other intraductal mass, likely will also need tissue sampling and metastatic workup (all once acute issues resolved) Discussed w/ daughters.       Anemia, required PRBC 1 U 9/23 and 1U today  Hx of varices, Hx of GIB  - No reported overt bleeding, monitor for bleeding  --- Keep Hb >7, and if bleeding PLT >50, fibrinogen > 120  --- Was on GRN coverage, and PPI already, plus on octreotide b/o suspected HRS  --- Completed Zosyn, still c/w SBP prophylaxis (secondary b/o SBP episode and b/o worsening anemia, concern for occult bleeding?)  --- Consider GI consult    Altered mental status   - Ammonia was 18 and < 10 on 9/15 and 9/20, respectively  --- CT head unremarkable  --- C/w lactulose and titrate to have 2-3 BM/day.    --- C/w rifaximin.   --- S/p EEG, no seizure activity, neurology evaluation appreciated, concern for L hemiplegia.  --- Can consider adding zinc.     Poor prognosis - Ongoing GOC discussions. Had extensive discussion with the patients daughters    Thank you for consult  Will continue to monitor.  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  Hepatology consulted for ACLF and been following since 9/18.   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  Patient remained to have poor mental status, not following commands, attempts to open eyes for verbal loud stimuli after several try. Remains on HRS mgmt. w/o improvement in Cr so far, urine output 25 cc /hr.       Decompensated cirrhosis  Ascites c/b SBP  KYA vs. KAY on CKD  Liver lesion  Biliary dilation  MELD 3.0 24- > 26    KAY vs. KAY on CKD  - Got 25%  albumin on 9/16 400 ml and 9/18 250 ml.  and was started on 1g/bwkg/day albumin on 9/19/23  - Has not responded to 25% albumin, Cr remained ~2, thus was started on full HRS mgmt. 9/21/23  --- Monitor I/O  --- C/w supportive measures per ICU, keep MAP > 75 mmHg  --- C/w 25 % albumin   --- C/w HRS mgmt., c/w octreotide (usually 100 mcg sc. tid and increase to 200 mcg tid if no response), and midodrine (and keep MAP > 75mmHg)  --- Repeat urine studies noted, Kathia <5  --- Nephrology consult appreciated, f/u recs.   --- Avoid NSAIDs  --- Hold off with diuretics      SBP  - BCx 9/15 one set neg, one set w/ Staph. capitis  - Ascites - rare Step. constellatus  --- C/w antibiotic coverage per ID. S/p Zosyn 9/15-9/23. Please, c/w secondary SBP prophylaxis.    --- Consider repeat Dx paracentesis - Was d/w ICU, did not have suitable pocket. Repeat bedside US if worsening abdominal distention.     Liver lesion  - AFP, CEA, CA 19-9 normal  --- Once acute issues resolved, will need MRI abd w/wo for better characterization, given the other intraductal mass, likely will also need tissue sampling and metastatic workup (all once acute issues resolved) Discussed w/ daughters.       Anemia, required PRBC 1 U 9/23 and 1U today  Hx of varices, Hx of GIB  - No reported overt bleeding, monitor for bleeding  --- Keep Hb >7, and if bleeding PLT >50, fibrinogen > 120  --- Was on GRN coverage, and PPI already, plus on octreotide b/o suspected HRS  --- Completed Zosyn, still c/w SBP prophylaxis (secondary b/o SBP episode and b/o worsening anemia, concern for occult bleeding?)  --- Consider GI consult    Altered mental status   - Ammonia was 18 and < 10 on 9/15 and 9/20, respectively  --- CT head unremarkable  --- C/w lactulose and titrate to have 2-3 BM/day.    --- C/w rifaximin.   --- S/p EEG, no seizure activity, neurology evaluation appreciated, concern for L hemiplegia.    Poor prognosis - Ongoing GOC discussions. Had extensive discussion with the patients daughters    Thank you for consult  Will continue to monitor.  D/w ICU

## 2023-09-26 NOTE — PROGRESS NOTE ADULT - ASSESSMENT
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 Pt a/w septic shock 2/2 SBP/ Bacteremia & decompensated liver cirrhosis. Nephrology consulted for Elevated serum creatinine.  s/p CT abd/ pelvis with IV contrast 9/15 with liver mass, and intraductal mass superior to pancreatic head    1. KAY- shaylee SCr 0.7-1. KAY in the setting of cirrhosis; HRS vs LEA (s/p CT with IV contrast on 9/15) with hypotension. UA trace protein, large blood and mod LE. FeNa 0.35% s/p albumin x 2 days (9/19-9/21) with no renal improvement (less likely pre-renal).    Pt now on tx for HRS with midodrine 5mg PO q8hrs, Octreotide gtt changed to 200mcg SC tid and IV albumin. Scr improving with suboptimal UO. If no improvement, can consider changing midodrine/octreotide to levo gtt and keeping MAP > 80.  Check LDH/haptoglobin r/o TMA.  FeNa low. Renal US with no hydro. c/w current tx. Strict I/Os. Avoid nephrotoxins/ NSAIDs/ RCA. Monitor BMP.  Patient with low serum CO2 but with mixed disorder and normal pH. No need for sodium bicarbonate.    2. Septic shock- due to spontaneous bacterial peritonitis and Staph capitis bacteremia. Plan as per ICU/ ID.     3. Hypotension- BP improved. Pt off IV pressors. Currently on Midodrine 5mg PO tid. Monitor BP    4. Cirrhosis - with liver mass. Plan as per Hepatology.        [facility]  Aubrey Nguyen M.D.  Tod Brewer D.O.  Thalia sOhea M.D.  MD Charlene Monae, MSN, ANP-C    Telephone: [phone]  Facsimile: [phone]    [address] [place] [address], #[place] [address]

## 2023-09-26 NOTE — PROGRESS NOTE ADULT - SUBJECTIVE AND OBJECTIVE BOX
Chief Complaint:  Patient is a 83y old  Male who presents with a chief complaint of Sepsis (26 Sep 2023 12:52)      Reason for consult:    Interval Events: Patient was seen and examined at bedside. Mental status remains poor, non responsive. Repeat CT head showed no acute intracranial findings, but dayron questions of fungal sinusitis?    Hospital Medications:  acetaminophen     Tablet .. 650 milliGRAM(s) Oral every 6 hours PRN  cefTRIAXone   IVPB 1000 milliGRAM(s) IV Intermittent every 24 hours  chlorhexidine 0.12% Liquid 15 milliLiter(s) Oral Mucosa every 12 hours  chlorhexidine 2% Cloths 1 Application(s) Topical daily  midodrine 5 milliGRAM(s) Oral every 8 hours  octreotide  Injectable 200 MICROGram(s) SubCutaneous three times a day  pantoprazole  Injectable 40 milliGRAM(s) IV Push daily  rifAXIMin 550 milliGRAM(s) Oral two times a day  sodium chloride 0.9% lock flush 10 milliLiter(s) IV Push every 1 hour PRN      ROS:   Unable to obtain due to patient condition.     PHYSICAL EXAM:   Vital Signs:  Vital Signs Last 24 Hrs  T(C): 35.9 (26 Sep 2023 15:34), Max: 36.5 (25 Sep 2023 21:00)  T(F): 96.6 (26 Sep 2023 15:34), Max: 97.7 (25 Sep 2023 21:00)  HR: 75 (26 Sep 2023 17:00) (65 - 121)  BP: 116/60 (26 Sep 2023 17:00) (102/66 - 140/76)  BP(mean): 77 (26 Sep 2023 17:00) (69 - 114)  RR: 24 (26 Sep 2023 17:00) (18 - 25)  SpO2: 100% (26 Sep 2023 17:00) (99% - 100%)      Daily     Daily Weight in k.7 (26 Sep 2023 07:00)        LABS: reviewed                        7.9    13.80 )-----------( 82       ( 26 Sep 2023 03:31 )             22.5     09-    139  |  108  |  49<H>  ----------------------------<  179<H>  3.7   |  21<L>  |  1.78<H>    Ca    9.3      26 Sep 2023 03:31  Phos  2.7       Mg     2.0         TPro  6.4  /  Alb  3.8  /  TBili  3.8<H>  /  DBili  x   /  AST  9<L>  /  ALT  11  /  AlkPhos  77      LIVER FUNCTIONS - ( 26 Sep 2023 03:31 )  Alb: 3.8 g/dL / Pro: 6.4 g/dL / ALK PHOS: 77 U/L / ALT: 11 U/L DA / AST: 9 U/L / GGT: x             Interval Diagnostic Studies: see sunrise for full report

## 2023-09-26 NOTE — PROGRESS NOTE ADULT - SUBJECTIVE AND OBJECTIVE BOX
INTERVAL HPI/OVERNIGHT EVENTS: No acute overnight events. Pt seen at bedside, intubated, off sedation, AAOx0, unable to follow commands. Unable to assess ROS due to mental status    PRESSORS: [ ] YES [x] NO  WHICH:    Antimicrobial:  rifAXIMin 550 milliGRAM(s) Oral two times a day    Cardiovascular:  midodrine 5 milliGRAM(s) Oral every 8 hours    Pulmonary:    Hematalogic:    Other:  acetaminophen     Tablet .. 650 milliGRAM(s) Oral every 6 hours PRN  chlorhexidine 0.12% Liquid 15 milliLiter(s) Oral Mucosa every 12 hours  chlorhexidine 2% Cloths 1 Application(s) Topical daily  octreotide  Infusion 50 MICROgram(s)/Hr IV Continuous <Continuous>  pantoprazole  Injectable 40 milliGRAM(s) IV Push daily  sodium chloride 0.9% lock flush 10 milliLiter(s) IV Push every 1 hour PRN    acetaminophen     Tablet .. 650 milliGRAM(s) Oral every 6 hours PRN  chlorhexidine 0.12% Liquid 15 milliLiter(s) Oral Mucosa every 12 hours  chlorhexidine 2% Cloths 1 Application(s) Topical daily  midodrine 5 milliGRAM(s) Oral every 8 hours  octreotide  Infusion 50 MICROgram(s)/Hr IV Continuous <Continuous>  pantoprazole  Injectable 40 milliGRAM(s) IV Push daily  rifAXIMin 550 milliGRAM(s) Oral two times a day  sodium chloride 0.9% lock flush 10 milliLiter(s) IV Push every 1 hour PRN    Drug Dosing Weight  Height (cm): 170.2 (15 Sep 2023 11:37)  Weight (kg): 64.7 (16 Sep 2023 16:30)  BMI (kg/m2): 22.3 (16 Sep 2023 16:30)  BSA (m2): 1.75 (16 Sep 2023 16:30)    CENTRAL LINE: [ ] YES [ ] NO  LOCATION:   DATE INSERTED:  REMOVE: [ ] YES [ ] NO  EXPLAIN:    REYNOLDS: [ ] YES [ ] NO    DATE INSERTED:  REMOVE:  [ ] YES [ ] NO  EXPLAIN:    A-LINE:  [ ] YES [ ] NO  LOCATION:   DATE INSERTED:  REMOVE:  [ ] YES [ ] NO  EXPLAIN:    PMH -reviewed admission note, no change since admission  PAST MEDICAL & SURGICAL HISTORY:  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      Liver cirrhosis  alcoholic/WALL cirrhosis c/b variceal bleed s/p banding (8/2018) and hepatic encephalopathy (several years ago)      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)      Pancreatitis  2/2 choledocholithiasis s/p ERCP with stone extraction and plastic stent placement (11/2019, stent now removed)      Hematochezia  2/2 colonic AVMs s/p cauterization and hemorrhoids (11/2019)      Melena  2/2 duodenal ulcers s/p argon plasma coagulation (4/2020)      Chronic kidney disease (CKD)      GERD (gastroesophageal reflux disease)      Hypothyroidism      H/O inguinal hernia repair      History of repair of hip fracture  R hip      History of hip replacement  10/2020      H/O spinal stenosis          ICU Vital Signs Last 24 Hrs  T(C): 36.3 (26 Sep 2023 07:00), Max: 36.5 (25 Sep 2023 21:00)  T(F): 97.4 (26 Sep 2023 07:00), Max: 97.7 (25 Sep 2023 21:00)  HR: 73 (26 Sep 2023 07:00) (58 - 121)  BP: 114/61 (26 Sep 2023 07:00) (106/64 - 140/76)  BP(mean): 76 (26 Sep 2023 07:00) (76 - 114)  ABP: --  ABP(mean): --  RR: 20 (26 Sep 2023 07:00) (18 - 27)  SpO2: 100% (26 Sep 2023 07:00) (99% - 100%)              09-25 @ 07:01  -  09-26 @ 07:00  --------------------------------------------------------  IN: 1425 mL / OUT: 710 mL / NET: 715 mL        Mode: AC/ CMV (Assist Control/ Continuous Mandatory Ventilation)  RR (machine): 16  TV (machine): 400  FiO2: 35  PEEP: 5  ITime: 0.8  MAP: 8  PIP: 19      PHYSICAL EXAM:    GENERAL: NAD, minimally responsive to voice, AAOX0, not following commands  HEAD:  Atraumatic, Normocephalic  EYES: EOMI, PERRLA, 3mm and brisk, conjunctiva and sclera clear  NECK: Supple, normal appearance, No JVD; Normal thyroid; Trachea midline  NERVOUS SYSTEM:  Alert & Oriented X0, no effort to move noted, limited passive ROM  CHEST/LUNG: diminished to auscultation bilaterally; No rales, rhonchi, wheezing   HEART: Regular rate and rhythm; No murmurs, rubs, or gallops  ABDOMEN: Distended, soft, hypoactive bowel sounds  EXTREMITIES:  2+ Peripheral Pulses, No clubbing, cyanosis, +2 generalized edema  SKIN: No rashes or lesions;  brisk capillary refill, UE ecchymosis noted, weeping upper extremities      LABS:  CBC Full  -  ( 26 Sep 2023 03:31 )  WBC Count : 13.80 K/uL  RBC Count : 2.86 M/uL  Hemoglobin : 7.9 g/dL  Hematocrit : 22.5 %  Platelet Count - Automated : 82 K/uL  Mean Cell Volume : 78.7 fl  Mean Cell Hemoglobin : 27.6 pg  Mean Cell Hemoglobin Concentration : 35.1 gm/dL  Auto Neutrophil # : 11.55 K/uL  Auto Lymphocyte # : 0.77 K/uL  Auto Monocyte # : 1.10 K/uL  Auto Eosinophil # : 0.16 K/uL  Auto Basophil # : 0.03 K/uL  Auto Neutrophil % : 82.9 %  Auto Lymphocyte % : 5.5 %  Auto Monocyte % : 7.9 %  Auto Eosinophil % : 1.1 %  Auto Basophil % : 0.2 %    09-26    139  |  108  |  49<H>  ----------------------------<  179<H>  3.7   |  21<L>  |  1.78<H>    Ca    9.3      26 Sep 2023 03:31  Phos  2.7     09-26  Mg     2.0     09-26    TPro  6.4  /  Alb  3.8  /  TBili  3.8<H>  /  DBili  x   /  AST  9<L>  /  ALT  11  /  AlkPhos  77  09-26    PT/INR - ( 25 Sep 2023 02:38 )   PT: 25.7 sec;   INR: 2.31 ratio         PTT - ( 25 Sep 2023 02:38 )  PTT:45.3 sec  Urinalysis Basic - ( 26 Sep 2023 03:31 )    Color: x / Appearance: x / SG: x / pH: x  Gluc: 179 mg/dL / Ketone: x  / Bili: x / Urobili: x   Blood: x / Protein: x / Nitrite: x   Leuk Esterase: x / RBC: x / WBC x   Sq Epi: x / Non Sq Epi: x / Bacteria: x      Culture Results:   No growth at 48 Hours (09-23 @ 18:49)  Culture Results:   No growth at 48 Hours (09-23 @ 18:30)      RADIOLOGY & ADDITIONAL STUDIES REVIEWED:  ***    [ ]GOALS OF CARE DISCUSSION WITH PATIENT/FAMILY/PROXY:    CRITICAL CARE TIME SPENT: 35 minutes

## 2023-09-26 NOTE — CHART NOTE - NSCHARTNOTEFT_GEN_A_CORE
Assessment:   Patient is a 83y old  Male who presents with a chief complaint of Sepsis (22 Sep 2023 17:08). Continues intubated. TF ordered , re-ordered . (see below). Hepatologist notes pt w/ poor prognosis w/ rec for goals of care discussion (see CCM note ) = Pt noted w/ AMS/ encephalopathy/ plan Lactulose rectally. KAY/ CKD, possible hepatorenal syndrome.        Diet Prescription: Diet, NPO with Tube Feed:   Tube Feeding Modality: Nasogastric  Jevity 1.5 Hakeem  Total Volume for 24 Hours (mL): 1080  Continuous  Starting Tube Feed Rate {mL per Hour}: 10  Increase Tube Feed Rate by (mL): 10     Every 4 hours  Until Goal Tube Feed Rate (mL per Hour): 45  Tube Feed Duration (in Hours): 24  Tube Feed Start Time: 18:00   Frequency: Every 4 Hours  No Carb Prosource (1pkg = 15gms Protein)     Qty per Day:  1 (23 @ 18:58)    Intake: TF order provides at goal): Jevity 1.5 1080 ml, 1620 kcals, 69 gm protein. 1 prosource adds 15 gm protein, 60 kcals.    Daily     Daily Weight in k.5 (22 Sep 2023 07:00)  Weight in k.7 (21 Sep 2023 08:00)  Weight in k (20 Sep 2023 07:15)  Weight in k.3 (19 Sep 2023 07:15)    % Weight Change: I>O, 1+ generalized, 2+ B/L arm 3+ scrotal edema noted    Pertinent Medications: MEDICATIONS  (STANDING):  albumin human 25% IVPB 100 milliLiter(s) IV Intermittent every 8 hours  chlorhexidine 0.12% Liquid 15 milliLiter(s) Oral Mucosa every 12 hours  chlorhexidine 2% Cloths 1 Application(s) Topical daily  midodrine 5 milliGRAM(s) Oral every 8 hours  octreotide  Infusion 50 MICROgram(s)/Hr (10 mL/Hr) IV Continuous <Continuous>  pantoprazole  Injectable 40 milliGRAM(s) IV Push daily  piperacillin/tazobactam IVPB.. 3.375 Gram(s) IV Intermittent every 8 hours    MEDICATIONS  (PRN):  acetaminophen     Tablet .. 650 milliGRAM(s) Oral every 6 hours PRN Temp greater or equal to 38C (100.4F), Mild Pain (1 - 3)  sodium chloride 0.9% lock flush 10 milliLiter(s) IV Push every 1 hour PRN Pre/post blood products, medications, blood draw, and to maintain line patency    Pertinent Labs:  Na137 mmol/L Glu 196 mg/dL<H> K+ 3.3 mmol/L<L> Cr  2.08 mg/dL<H> BUN 29 mg/dL<H>  Phos 2.9 mg/dL  Alb 3.7 g/dL      Estimated Needs:   [ ] no change since previous assessment  [ ] recalculated:       Previous Nutrition Diagnosis:   [ ] Altered GI function  [ ]Inadequate Oral Intake [ ] Swallowing Difficulty   [ ] Altered nutrition related labs [ ] Increased Nutrient Needs [ ] Overweight/Obesity   [ ] Unintended Weight Loss [ ] Food & Nutrition Related Knowledge Deficit [x ] Malnutrition (at least moderate PCM)  [ ] Other:     Nutrition Diagnosis is [x ] ongoing  [ ] resolved [ ] not applicable           Interventions:   Recommend  [ ] Change Diet To:  [ ] Nutrition Supplement  [x ] Nutrition Support: TF as medically feasible and consistent with goals of care. MD to monitor. RD available.   [ ] Other:     Monitoring and Evaluation:   [ x ] follow up per protocol  [ ] other:
Patient's Right Internal Jugular CVC was removed. Sutures were removed and pressure held for 10 minutes. No bleeding noted. Dressing applied.
SBP Diagnosis confirmed. Ordered 1.5 gram/kg of albumin to be administered. Currently on Zosyn.
Spoke to wife, Allie Florez over the phone. Updated family on pt's current clinical status and that the patient's mental status is slowly improving, however, clinical status is unchanged from yesterday. Medical treatment plan and prognosis discussed and all questions answered.
EVENT: notified by RN pt with tachycardia and cough   Pt seen and examined, c/o sob    OBJECTIVE:  Vital Signs Last 24 Hrs  T(C): 36.3 (16 Sep 2023 14:36), Max: 36.6 (15 Sep 2023 15:15)  T(F): 97.4 (16 Sep 2023 14:36), Max: 97.8 (15 Sep 2023 15:15)  HR: 119 (16 Sep 2023 14:36) (86 - 133)  BP: 127/77 (16 Sep 2023 14:36) (89/66 - 127/77)  BP(mean): --  RR: 20 (16 Sep 2023 14:36) (20 - 25)  SpO2: 92% (16 Sep 2023 14:36) (92% - 100%)    Parameters below as of 16 Sep 2023 14:36  Patient On (Oxygen Delivery Method): room air    REVIEW OF SYSTEMS:  CONSTITUTIONAL: No fever, chills  NECK: No pain or stiffness  RESPIRATORY: +ve cough, +ve SOB  CARDIOVASCULAR: No chest pain, palpitations  GASTROINTESTINAL: +ve abdominal pain. No nausea, vomiting, or diarrhea  GENITOURINARY: No dysuria  NEUROLOGICAL: No HA  MUSCULOSKELETAL: No joint pain or swelling     PHYSICAL EXAM:  GENERAL: frail, cachectic, +ve SPARKS   EYES: clear conjunctiva  ENMT: Moist mucous membranes  NECK: Supple, No JVD  CHEST/LUNG: Clear to diminished bilaterally; No rales, rhonchi, wheezing, or rubs  HEART: S1, S2, Regular rate and rhythm, tachycardic   ABDOMEN: +ve diffuse tenderness, distended,  Bowel sounds present  NEURO: Alert & Oriented to person and place, forgetful   EXTREMITIES: No LE edema, no calf tenderness    LABS:                        12.5   6.53  )-----------( 171      ( 16 Sep 2023 04:55 )             38.2   CARDIAC MARKERS ( 15 Sep 2023 12:40 )  x     / x     / 116 U/L / x     / x        09-16    134<L>  |  102  |  23<H>  ----------------------------<  119<H>  4.2   |  17<L>  |  1.68<H>    Ca    9.1      16 Sep 2023 14:09  Phos  3.2     09-16  Mg     1.3     09-16    TPro  6.1  /  Alb  1.6<L>  /  TBili  1.4<H>  /  DBili  1.0<H>  /  AST  14  /  ALT  9<L>  /  AlkPhos  219<H>  09-16    A/P: 83 year old  Male from home, ambulates with walker with pmhx of hypothyroidism, alcoholic cirrhosis, duodenal ulcers, AVM's, CKD, GBS, 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), 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

## 2023-09-26 NOTE — PROGRESS NOTE ADULT - SUBJECTIVE AND OBJECTIVE BOX
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 Pt a/w septic shock 2/2 SBP/ Bacteremia & decompensated liver cirrhosis. Nephrology consulted for Elevated serum creatinine.  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    No Known Allergies      Hospital Medications: Medications reviewed    VITALS:  T(F): 97.4 (09-26-23 @ 07:00), Max: 97.7 (09-25-23 @ 21:00)  HR: 74 (09-26-23 @ 12:00)  BP: 113/59 (09-26-23 @ 12:00)  RR: 24 (09-26-23 @ 12:00)  SpO2: 100% (09-26-23 @ 12:00)  Wt(kg): --    09-25 @ 07:01  -  09-26 @ 07:00  --------------------------------------------------------  IN: 1425 mL / OUT: 710 mL / NET: 715 mL      PHYSICAL EXAM:  Gen: NAD, intubated  Cards: RRR, +S1/S2, no M/G/R  Resp: course BS B/L  GI: soft, NT, + ab distention, NABS  : + rodriguez  Vascular:  + anasarca      LABS:  09-26    139  |  108  |  49<H>  ----------------------------<  179<H>  3.7   |  21<L>  |  1.78<H>    Ca    9.3      26 Sep 2023 03:31  Phos  2.7     09-26  Mg     2.0     09-26    TPro  6.4  /  Alb  3.8  /  TBili  3.8<H>  /  DBili      /  AST  9<L>  /  ALT  11  /  AlkPhos  77  09-26    Creatinine Trend: 1.78 <--, 2.08 <--, 2.00 <--, 2.12 <--, 2.08 <--, 2.02 <--, 1.94 <--                        7.9    13.80 )-----------( 82       ( 26 Sep 2023 03:31 )             22.5     Urine Studies:  Urinalysis Basic - ( 26 Sep 2023 03:31 )    Color:  / Appearance:  / SG:  / pH:   Gluc: 179 mg/dL / Ketone:   / Bili:  / Urobili:    Blood:  / Protein:  / Nitrite:    Leuk Esterase:  / RBC:  / WBC    Sq Epi:  / Non Sq Epi:  / Bacteria:       Sodium, Random Urine: <5 mmol/L (09-23 @ 14:26)  Osmolality, Random Urine: 373 mos/kg (09-23 @ 14:26)  Potassium, Random Urine: 40 mmol/L (09-23 @ 14:26)  Creatinine, Random Urine: 71 mg/dL (09-23 @ 14:26)

## 2023-09-27 NOTE — PROGRESS NOTE ADULT - ASSESSMENT
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  Hepatology consulted for ACLF and been following since 9/18.   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  Patient remained to have poor mental status, not following commands, attempts to open eyes for verbal loud stimuli after several try. Remains on HRS mgmt. w/o improvement in Cr so far, urine output 25 cc /hr.       Decompensated cirrhosis  Ascites c/b SBP  KAY vs. KAY on CKD  Liver lesion  Biliary dilation  MELD 3.0 24- > 26    KAY vs. KAY on CKD  - Got 25%  albumin on 9/16 400 ml and 9/18 250 ml.  and was started on 1g/bwkg/day albumin on 9/19/23  - Has not responded to 25% albumin, Cr remained ~2, thus was started on full HRS mgmt. 9/21/23  --- Monitor I/O  --- C/w supportive measures per ICU, keep MAP > 75 mmHg  --- C/w 25 % albumin   --- C/w HRS mgmt., c/w octreotide (usually 100 mcg sc. tid and increase to 200 mcg tid if no response), and midodrine (and keep MAP > 75mmHg)  --- Repeat urine studies noted, Kathia <5  --- Nephrology consult appreciated, f/u recs.   --- Avoid NSAIDs  --- Hold off with diuretics      SBP  - BCx 9/15 one set neg, one set w/ Staph. capitis  - Ascites - rare Step. constellatus  --- C/w antibiotic coverage per ID. S/p Zosyn 9/15-9/23. Please, c/w secondary SBP prophylaxis.    --- Consider repeat Dx paracentesis - Was d/w ICU, did not have suitable pocket. Repeat bedside US if worsening abdominal distention.     Liver lesion  - AFP, CEA, CA 19-9 normal  --- Once acute issues resolved, will need MRI abd w/wo for better characterization, given the other intraductal mass, likely will also need tissue sampling and metastatic workup (all once acute issues resolved) Discussed w/ daughters.       Anemia, required PRBC 1 U 9/23 and 1U today  Hx of varices, Hx of GIB  - No reported overt bleeding, monitor for bleeding  --- Keep Hb >7, and if bleeding PLT >50, fibrinogen > 120  --- Was on GRN coverage, and PPI already, plus on octreotide b/o suspected HRS  --- Completed Zosyn, still c/w SBP prophylaxis (secondary b/o SBP episode and b/o worsening anemia, concern for occult bleeding?)  --- Consider GI consult    Altered mental status   - Ammonia was 18 and < 10 on 9/15 and 9/20, respectively  --- CT head unremarkable  --- Restart lactulose and titrate to have 2-3 BM/day.    --- C/w rifaximin.   --- S/p EEG, no seizure activity, neurology evaluation appreciated, concern for L hemiplegia.    Poor prognosis - Ongoing GOC discussions. Had extensive discussion with the patients daughters    Thank you for consult  Will continue to monitor.  D/w ICU

## 2023-09-27 NOTE — CONSULT NOTE ADULT - CONSULT REQUESTED DATE/TIME
18-Sep-2023
18-Sep-2023 17:38
22-Sep-2023 09:00
27-Sep-2023 13:06
22-Sep-2023 19:47
25-Sep-2023 10:20
16-Sep-2023 15:43

## 2023-09-27 NOTE — PROGRESS NOTE ADULT - ASSESSMENT
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 Originally presented to [facility] ED on 9/15 complaining of generalized abdominal pain x 2 days associated with generalized fatigue and decreased appetite; also reported midsternal chest pain and having black stools for the past 2 days, along with dark-colored urine.     Patient was originally admitted to floor for sepsis 2/2 presumed by SBP and decompensated cirrhosis.  Started on IV Zosyn; diagnostic paracentesis performed on 9/15 confirmed SBP (cultures negative, but ascites fluid turbid with > 9000 neutrophils).  On 9/16 patient developed AHRF with worsening tachypnea, secretions, and IWOB; patient intubated and transferred to ICU, temporarily required pressors, remains on midodrine for BP support.  Patient off sedation since 9/18, but remains intubated, lethargic, minimally responsive with nonpurposeful movements.  ICU course also complicated by worsening KAY on CKD with concern for possible hepatorenal syndrome, Hepatology consulted, now  on IV Octreotide infusion.  Palliative Care consult requested for complex medical decision in context of ESLD with persistent respiratory failure and encephalopathy.    Patient remains encephalopathic with minimal improvement in mental status, EEG  and CT of Head x 2 negative, MRI of brain pending.  Patient now with agonal respirations on vent, appears to be actively dying

## 2023-09-27 NOTE — PROGRESS NOTE ADULT - ASSESSMENT
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 As per wife he has no  cough, runny nose. Patient has been having black stools for the past 2 days. Pt noted to have increased secretions, tachycardia, tachypnea and AHRF with increased WOB. Admitted to ICU for AHRF requiring intubation for airway protection.     # Acute Hypoxic Resp failure   # AMS / Encephalopathy   # Hypotension   # KAY on CKD with possibility for Hepatorenal syndrome   # Alcohol inappropriate use   # Liver cirrhosis   # SBP  # Sepsis       _________CNS___________  -Pt a&ox3 at baseline  -currently intubated, currently off all sedation since 9/18, with poor neuro exam as above  - CTH 9/23 NEG  - Palliative Following   - on-going GOC discussions with the family (patient was previously trached for Guillian-Laredo syndrome)  - Neuro Dr. Paula consulted  - spot EEG 9/25: Moderate to severe  nonspecific diffuse or multifocal cerebral dysfunction. No seizures  - repeat CTH negative: chrnoic R maxillary sinus opacifications w/ central funcal overgrowth and /or inpissated secretions  -surg consulted for trach + peg eval    _________CVS___________  #Sepsis   -secondary to Spontaneous Bacterial Peritonitis  (confirmed on 9/15 with Diagnostic Para > 9000 Neutrophils)  - Peritoneal culture +streptococcus constellatus (9/15), blood cultures +staphylococcus capitis (9/15)  - completed piperacillin-tazobactam 9/24  - repeat blood cultures 9/23 NGTD    _________ RESP__________  #AHRF   - intubated on 9/16 for Increased work of breathing  - CT A/P/C: basilar atelectasis   - mechanically ventilated off all sedation   - ventilator settings minimal but mental status too poor for extubation  - SBT today 9/25    __________GI____________  #Alcoholic Cirrhosis  - multiple hospitalization for encephalopathy    - on spironolactone and lactulose at home   - hypotensive likely due to splanchnic vasodilation- off vasopressors 9/21, continue albumin 25% 50ml x2     - CTA/P showing ascites   - will hold spironolactone for now  - s/p diagnostic paracentesis 9/15- confirmed SBP         - piperacillin-tazobactam 3.375mg completed 9/24  - c/w octreotide and midodrine for HRS (9/21)  - rifaximin added (9/26)    #SBP  - confirmed- s/p diagnostic paracentesis in ED on 9/15 with > 9000 neutrophils   - Peritoneal culture +streptococcus constellatus (9/15)  - s/p albumin 25% 50ml completed 9/24  - completed piperacillin-tazobactam 9/24    #GI Bleed  BRBPR 9/25  H/H 7.9 > 6.7 > 1U > 8.2 > 7.9 > 7.8 (9/27)  trend H/H    ________ RENAL__________  #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 Oshea following  - continue octreotide and midodrine for HRS (9/21)  SCr: 1.78 > 1.7 (9/27)    _________MSK___________  Generalized weakness  No focal deficit     __________ID____________  #bacteremia  - coagulase negative staph on one set of blood cultures  - peritoneal cultures Strep Constellatus  - afebrile, no leukocytosis,  - completed piperacillin/tazobactam 3.375g 9/24  - repeat blood cultures 9/23 NGTD    _________ENDO__________  #no active issues    _______HEME/ONC_______  #microcytic Anemia  - likely multifactorial, in the setting of chronic illness and poor PO intake (nutritional deficiencies)  - continue to trend h/h  - continue to assess for signs of bleeding  BRBPR 9/25  H/H 7.9 > 6.7 > 1U >8.2 > 7.9 > 7.8    _________SKIN____________  -Multiple area of ecchymosis on UE  - fragile skin     ________Prophylaxis_______  #DVT   -SCDs, chemical ppx contraindicated  #GI  -pantoprazole 40mg IVP daily     ________GOC/ DISPO_______  - ICU    - Continue GOC discussion with family  - Full Code

## 2023-09-27 NOTE — PROGRESS NOTE ADULT - PROBLEM SELECTOR PLAN 3
Patient with end stage alcoholic cirrhosis, aggravated by history of recurrent gallbladder/biliary disease (including porcelain gallbladder and Mirizzi syndrome).  MELD score of 25.  Now with concern for development of HRS.  Overall prognosis is poor, especially given his age, medical frailty, and multiple medical comorbidities.  Patient is medically appropriate for hospice with likely prognosis of 6 months or less, regardless of treatment    9/27--patient now agonal on vent, actively dying, prognosis now likely hours to days.  See [facility] discussion above--patient is DNR, family awaiting results of MRI of brain prior to deciding about transition to comfort care measures  Continue Rifaximin, midodrine, and IV octreotide drip

## 2023-09-27 NOTE — PROGRESS NOTE ADULT - SUBJECTIVE AND OBJECTIVE BOX
follow up on:  complex medical decision making related to goals of care    [facility] Geriatric and Palliative Consult Service:  Maribell Park DO: cell ([phone])  Mateo Espinosa MD: cell ([phone])  Francisco Perez NP: cell [phone])   Federico Conn LMSW: cell [phone])   Piedad Cardenas NP: via [address] Teams    Can contact any Palliative Team member via [address] Teams for consults and questions      INTERIM HISTORY/OVERNIGHT EVENTS:  Has remained intubated, unresponsive, off sedation, with little improvement in mental status over last 72-96 hours.  EEG performed (9/25) negative for seizure activity (but + for moderate to severe diffuse cerebral slowing).  CT of head x 2 negative for acute bleed/mass/infarct.  Per collaboration with ICU team, MRI of brain pending.    Earlier this morning, patient developed worsening respiratory failure with agonal respirations, vent settings adjusted (at one point was up to 100% FiO2).    Patient examined late this afternoon with wife and daughters at bedside.  Remains intubated with heavy labored respirations.  No overt signs of pain noted    Present Symptoms: Mild, Moderate, Severe  [place]             Location -                               Aggravating factors -             Quality -             Radiation -             Timing-             Severity (0-10 scale):             Minimal acceptable level (0-10 scale):  Fatigue:  Nausea:  Lack of Appetite:   SOB:  Depression:  Anxiety:  Review of Systems: [All others negative or Unable to obtain due to poor mentation]    CPOT:    https://www.[email or web address]/getattachment/[email or web address]/Critical-Care-Pain-Observation-Tool-(CPOT)  PAIN AD Score:   http://geriatrictoolkit.[email or web address]/painad.pdf (press ctrl +  left click to view)MEDICATIONS  (STANDING):  albumin human 25% IVPB 50 milliLiter(s) IV Intermittent every 6 hours  cefTRIAXone   IVPB 1000 milliGRAM(s) IV Intermittent every 24 hours  chlorhexidine 0.12% Liquid 15 milliLiter(s) Oral Mucosa every 12 hours  chlorhexidine 2% Cloths 1 Application(s) Topical daily  midodrine 5 milliGRAM(s) Oral every 8 hours  norepinephrine Infusion 0.05 MICROgram(s)/kG/Min (6.07 mL/Hr) IV Continuous <Continuous>  octreotide  Injectable 200 MICROGram(s) SubCutaneous three times a day  pantoprazole  Injectable 40 milliGRAM(s) IV Push daily  rifAXIMin 550 milliGRAM(s) Oral two times a day    MEDICATIONS  (PRN):  acetaminophen     Tablet .. 650 milliGRAM(s) Oral every 6 hours PRN Temp greater or equal to 38C (100.4F), Mild Pain (1 - 3)  sodium chloride 0.9% lock flush 10 milliLiter(s) IV Push every 1 hour PRN Pre/post blood products, medications, blood draw, and to maintain line patency      PHYSICAL EXAM:  Vital Signs Last 24 Hrs  T(C): 36.1 (27 Sep 2023 20:00), Max: 36.2 (27 Sep 2023 15:00)  T(F): 97 (27 Sep 2023 20:00), Max: 97.2 (27 Sep 2023 15:00)  HR: 69 (27 Sep 2023 23:00) (54 - 117)  BP: 80/58 (27 Sep 2023 23:00) (71/51 - 136/69)  BP(mean): 64 (27 Sep 2023 23:00) (59 - 93)  RR: 23 (27 Sep 2023 23:00) (18 - 27)  SpO2: 100% (27 Sep 2023 23:00) (87% - 100%)    Parameters below as of 27 Sep 2023 20:00  Patient On (Oxygen Delivery Method): ventilator    O2 Concentration (%): 80    General: alert  oriented x ____    lethargic distressed cachexia  verbal nonverbal  unarousable     Palliative Performance Scale/Karnofsky Score:  http://npcrc.org/files/news/palliative_performance_scale_ppsv2.pdf    HEENT: no abnormal lesion, dry mouth  ET tube/trach oral lesions:  Lungs: tachypnea/labored breathing, audible excessive secretions  CV: RRR, S1S2, tachycardia  GI: soft non distended non tender  incontinent               PEG/NG/OG tube  constipation  last BM:   : incontinent  oliguria/anuria  rodriguez  Musculoskeletal: weakness x4 edema x4    ambulatory with assistance   mostly/fully bedbound/wheelchair bound  Skin: no abnormal skin lesions, poor skin turgor, pressure ulcers stage:   Neuro: no deficits, mild cognitive impairment dsyphagia/dysarthria paresis  Oral intake ability: unable/only mouth care, minimal moderate full capability    LABS:                          7.8    14.38 )-----------( 109      ( 27 Sep 2023 03:40 )             22.1     09-27    140  |  107  |  52<H>  ----------------------------<  148<H>  4.1   |  21<L>  |  1.70<H>    Ca    9.3      27 Sep 2023 03:40  Phos  2.9     09-27  Mg     2.0     09-27    TPro  6.5  /  Alb  3.4<L>  /  TBili  3.8<H>  /  DBili  x   /  AST  13  /  ALT  11  /  AlkPhos  93  09-27    Urinalysis Basic - ( 27 Sep 2023 03:40 )    Color: x / Appearance: x / SG: x / pH: x  Gluc: 148 mg/dL / Ketone: x  / Bili: x / Urobili: x   Blood: x / Protein: x / Nitrite: x   Leuk Esterase: x / RBC: x / WBC x   Sq Epi: x / Non Sq Epi: x / Bacteria: x        RADIOLOGY & ADDITIONAL STUDIES: follow up on:  complex medical decision making related to goals of care    [facility] Geriatric and Palliative Consult Service:  Maribellmaegan Marr DO: cell ([phone])  Mateo Espinosa MD: cell ([phone])  Francisco Perez NP: cell ([phone])   Federico Conn LMSW: cell ([phone])   Piedad Cardenas NP: via [employer] Teams    Can contact any Palliative Team member via [employer] Teams for consults and questions      INTERIM HISTORY/OVERNIGHT EVENTS:  Has remained intubated, unresponsive, off sedation, with little improvement in mental status over last 72-96 hours.  EEG performed (9/25) negative for seizure activity (but + for moderate to severe diffuse cerebral slowing).  CT of head x 2 negative for acute bleed/mass/infarct.  Per collaboration with ICU team, MRI of brain pending.    Earlier this morning, patient developed worsening respiratory failure with agonal respirations, vent settings adjusted (at one point was up to 100% FiO2).    Patient examined late this afternoon with wife and daughters at bedside.  Remains intubated with heavy labored respirations.  No overt signs of pain noted    Present Symptoms: Mild, Moderate, Severe  Pain:               Location -    Patient intubated, CPOT 2                     Aggravating factors -             Quality -             Radiation -             Timing-             Severity (0-10 scale):             Minimal acceptable level (0-10 scale):  Fatigue:  Nausea:  Lack of Appetite:   SOB:  Depression:  Anxiety:  Review of Systems: Unable to obtain due to poor mentation    CPOT:  2  https://www.[email or web address]/getattachment/[email or web address]/Critical-Care-Pain-Observation-Tool-(CPOT)      MEDICATIONS  (STANDING):  albumin human 25% IVPB 50 milliLiter(s) IV Intermittent every 6 hours  cefTRIAXone   IVPB 1000 milliGRAM(s) IV Intermittent every 24 hours  chlorhexidine 0.12% Liquid 15 milliLiter(s) Oral Mucosa every 12 hours  chlorhexidine 2% Cloths 1 Application(s) Topical daily  midodrine 5 milliGRAM(s) Oral every 8 hours  norepinephrine Infusion 0.05 MICROgram(s)/kG/Min (6.07 mL/Hr) IV Continuous <Continuous>  octreotide  Injectable 200 MICROGram(s) SubCutaneous three times a day  pantoprazole  Injectable 40 milliGRAM(s) IV Push daily  rifAXIMin 550 milliGRAM(s) Oral two times a day    MEDICATIONS  (PRN):  acetaminophen     Tablet .. 650 milliGRAM(s) Oral every 6 hours PRN Temp greater or equal to 38C (100.4F), Mild Pain (1 - 3)  sodium chloride 0.9% lock flush 10 milliLiter(s) IV Push every 1 hour PRN Pre/post blood products, medications, blood draw, and to maintain line patency      PHYSICAL EXAM:  Vital Signs Last 24 Hrs  T(C): 36.1 (27 Sep 2023 20:00), Max: 36.2 (27 Sep 2023 15:00)  T(F): 97 (27 Sep 2023 20:00), Max: 97.2 (27 Sep 2023 15:00)  HR: 69 (27 Sep 2023 23:00) (54 - 117)  BP: 80/58 (27 Sep 2023 23:00) (71/51 - 136/69)  BP(mean): 64 (27 Sep 2023 23:00) (59 - 93)  RR: 23 (27 Sep 2023 23:00) (18 - 27)  SpO2: 100% (27 Sep 2023 23:00) (87% - 100%)    Parameters below as of 27 Sep 2023 20:00  Patient On (Oxygen Delivery Method): ventilator    O2 Concentration (%): 80    General: alert  oriented x __0__   unresponsive, + agonal respirations, + cachectic with temporal wasting, in mild respiratory distress    Palliative Performance Scale/Karnofsky Score: 10%  http://npcrc.org/files/news/palliative_performance_scale_ppsv2.pdf    HEENT:  EOMI, anicteric, + ETT  Lungs:  + tachypneic with labored respirations/accessory muscle use  CV: RRR,  normal S1 and S2, no murmur  GI: soft nontender, mildly distended (+ ascites), normal bowel sounds  : incontinent  + Garcia cath in place  Musculoskeletal: weakness x4 in all extremities, currently bedbound, no edema noted  Skin: no abnormal skin lesions or DU noted  Neuro: no focal deficits, + severe cognitive impairment (2/2 AMS/lethargy)  Oral intake ability: unable/mouth care only      LABS:                          7.8    14.38 )-----------( 109      ( 27 Sep 2023 03:40 )             22.1     09-27    140  |  107  |  52<H>  ----------------------------<  148<H>  4.1   |  21<L>  |  1.70<H>    Ca    9.3      27 Sep 2023 03:40  Phos  2.9     09-27  Mg     2.0     09-27    TPro  6.5  /  Alb  3.4<L>  /  TBili  3.8<H>  /  DBili  x   /  AST  13  /  ALT  11  /  AlkPhos  93  09-27    Urinalysis Basic - ( 27 Sep 2023 03:40 )    Color: x / Appearance: x / SG: x / pH: x  Gluc: 148 mg/dL / Ketone: x  / Bili: x / Urobili: x   Blood: x / Protein: x / Nitrite: x   Leuk Esterase: x / RBC: x / WBC x   Sq Epi: x / Non Sq Epi: x / Bacteria: x        RADIOLOGY & ADDITIONAL STUDIES:    ACC: [id number] EXAM:  CT BRAIN   ORDERED BY: SALEEM POOLE     PROCEDURE DATE:  09/26/2023          INTERPRETATION:  CLINICAL INDICATION: Altered mental status.    TECHNIQUE: CT of the head was performed without the administration of   intravenous contrast.    COMPARISON: CT head 9/23/2023.    FINDINGS:  No acute transcortical infarction or acute intracranial hemorrhage.    White matter hypoattenuating foci are noted, compatible with small vessel   disease.    No hydrocephalus. No extra-axial fluid collections.    The visualized intraorbital contents are unremarkable. Complete right   maxillary sinus opacification with central hyperattenuation which could   represent fungal overgrowth and/or inspissated secretions. The mastoid   air cells areclear.    IMPRESSION:    -No acute intracranial findings.    -Chronic right maxillary sinus opacification with central fungal   overgrowth and/or inspissated secretions.    --- End of Report ---      ACC: [id number] EXAM:  CT BRAIN   ORDERED BY: SALEEM POOLE     PROCEDURE DATE:  09/26/2023          INTERPRETATION:  CLINICAL INDICATION: Altered mental status.    TECHNIQUE: CT of the head was performed without the administration of   intravenous contrast.    COMPARISON: CT head 9/23/2023.    FINDINGS:  No acute transcortical infarction or acute intracranial hemorrhage.    White matter hypoattenuating foci are noted, compatible with small vessel   disease.    No hydrocephalus. No extra-axial fluid collections.    The visualized intraorbital contents are unremarkable. Complete right   maxillary sinus opacification with central hyperattenuation which could   represent fungal overgrowth and/or inspissated secretions. The mastoid   air cells areclear.    IMPRESSION:    -No acute intracranial findings.    -Chronic right maxillary sinus opacification with central fungal   overgrowth and/or inspissated secretions.    --- End of Report ---

## 2023-09-27 NOTE — PROGRESS NOTE ADULT - PROBLEM SELECTOR PLAN 6
As above.  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  Patient is hospice appropriate, now in terminal respiratory failure.  Prognosis of hours to days.    See GOC discussion above--patient now DNR; comfort care options, including use of opioids and possible palliative extubation discussed with family, they wish to proceed with MRI before considering possible transition to comfort care    Remainder of management per ICU team  Palliative care team will continue to follow.

## 2023-09-27 NOTE — CONSULT NOTE ADULT - ASSESSMENT
83y.o. Male with prolonged intubation    -Will revisit pt with attending.  -CT reviewed with attending, poor surgical candidate for PEG given gross ascites.    This note and its recommendations herein are preliminary until such time as cosigned by an attending.

## 2023-09-27 NOTE — PROGRESS NOTE ADULT - CONVERSATION DETAILS
Follow-up face to face meeting held with wife Shannon and two daughters at bedside x 25 minutes to discuss prognosis, ACP, goals of care, and treatment preferences.  ICU resident Dr. Bowman also participated in the meeting.  Wife and daughters appear to understand that given patient's acute change in his breathing and his worsening respiratory failure (despite being on the ventilator), he now appears to be actively dying and approaching death.  Family now in agreement for DNR in case of cardiac arrest, MOLST completed by ICU team.  Discussed possible transition to comfort care measures, including use of opioids for management of pain/dyspnea and possible palliative extubation.  While the chances of the patient regaining any meaningful neurologic recovery are grim, family would like to proceed with planned MRI, to make sure " no stone is unturned" in regards to looking for any reversible cause of his encephalopathy.  Family counseled that at this point, given his severe/prolonged encephalopathy, MRI results are unlikely to change overall patient management; family also counseled that patient may  prior to MRI being performed.  Family voiced understanding, but they wish to proceed with MRI anyway.  Also discussed that use of opioids for comfort/tx of dyspnea may result in additional sedation and decrease in patient's mentation; family stated that they would like to avoid any additional depression of patient's mental status until MRI results are known, even if it results  in temporary increase in his discomfort.  Family stated that if MRI shows no reversible cause or rationale for intervention, then they would be in agreement with transition to comfort measures at that time.  Family was appreciative of the conversation, and all their questions were answered.
Spoke to pt's wife and daughter at bedside. Had an extensive discussion about pt's current clinical status and medical management. Discussed that the patient has multiple organs that are affected due to his chronic liver disease. Family endorsed that they have had multiple GOC conversations in the past and because he has been able to be discharged from the hospital multiple times, they remain hopeful, even though they understand his prognosis is poor. They would like the patient to continue all medical management and would like to have him remain FULL CODE.

## 2023-09-27 NOTE — PROGRESS NOTE ADULT - PROBLEM SELECTOR PLAN 1
In setting of sepsis 2/2 SBP  Remains intubated and poorly responsive off sedation, now agonal on ventilator since AM 9/27, actively dying    See C discussion above--family now in agreement with DNR, wants to wait for results of MRI of brain prior to making any decisions about comfort care.    Continue management as per ICU team.

## 2023-09-27 NOTE — PROGRESS NOTE ADULT - PROBLEM SELECTOR PLAN 5
Clinical evidence indicates that the patient has Severe protein calorie malnutrition/ 3rd degree    In context of     Acute Illness/Injury (>7days)    vs    Chronic Illness (>1 month)--ESLD/decompensated alcoholic cirrhosis, now aggravated by respiratory failure and probable hepatorenal syndrome, as evidenced by:    Energy/Food intake <50% of estimated energy requirement >5 days  Weight loss: Moderate - severe   Body Fat loss: Severe   (Cachectic with mild temporal wasting)  Muscle mass loss: Severe  (Skin failure/pressure ulcers)--albumin 2.0 on admission  Fluid Accumulation: Severe (Fluid overload, + gross ascites)   Strength: weakened severe (bedbound)    Recommend:   Currently remains intubated, NPO on NGT feeds- maintain strict aspiration precautions, teaching given to caregivers

## 2023-09-27 NOTE — PROGRESS NOTE ADULT - SUBJECTIVE AND OBJECTIVE BOX
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 Found to have Sepsis secondary to Spontaneous Bacterial Peritonitis (confirmed on 9/15 with Diagnostic Para > 9000 Neutrophils). Peritoneal culture +streptococcus constellatus (9/15), blood cultures +staphylococcus capitis (9/15), completed piperacillin-tazobactam 9/24. Repeat blood cultures 9/23 NGTD. Admitted to ICU for AHRF requiring intubation for airway protection.     Interval History -  Neurology consulted for worsening mental status change. Pt initially A&Ox3, currently off all sedation since 9/18. CTH 9/23 NEG. Spot EEG 9/25: Moderate to severe  nonspecific diffuse or multifocal cerebral dysfunction. No seizures. Repeat CTH negative: chronic R maxillary sinus opacifications w/ central funcal overgrowth and /or inpissated secretions. Pending MRI non-con today. Spoke with family at beside about pt's poor prognosis.       Subjective:    Review of Systems:  Constitutional: No generalized weakness. No fevers or chills.                    Eyes, Ears, Mouth, Throat: No vision loss   Respiratory: No shortness of breath or cough.                                Cardiovascular: No chest pain or palpitations  Gastrointestinal: No nausea or vomiting.                                         Genitourinary: No urinary incontinence or burning on urination.  Musculoskeletal: No joint pain.                                                           Dermatologic: No rash.  Neurological: as per HPI                                                                      Psychiatric: No behavioral problems.  Endocrine: No known hypoglycemia.               Hematologic/Lymphatic: No easy bleeding.    MEDICATIONS  (STANDING):  albumin human 25% IVPB 50 milliLiter(s) IV Intermittent every 6 hours  cefTRIAXone   IVPB 1000 milliGRAM(s) IV Intermittent every 24 hours  chlorhexidine 0.12% Liquid 15 milliLiter(s) Oral Mucosa every 12 hours  chlorhexidine 2% Cloths 1 Application(s) Topical daily  midodrine 5 milliGRAM(s) Oral every 8 hours  octreotide  Injectable 200 MICROGram(s) SubCutaneous three times a day  pantoprazole  Injectable 40 milliGRAM(s) IV Push daily  rifAXIMin 550 milliGRAM(s) Oral two times a day    MEDICATIONS  (PRN):  acetaminophen     Tablet .. 650 milliGRAM(s) Oral every 6 hours PRN Temp greater or equal to 38C (100.4F), Mild Pain (1 - 3)  sodium chloride 0.9% lock flush 10 milliLiter(s) IV Push every 1 hour PRN Pre/post blood products, medications, blood draw, and to maintain line patency      Allergies    No Known Allergies    Intolerances        Objective:   Vital Signs Last 24 Hrs  T(C): 36.2 (27 Sep 2023 15:00), Max: 36.2 (27 Sep 2023 15:00)  T(F): 97.2 (27 Sep 2023 15:00), Max: 97.2 (27 Sep 2023 15:00)  HR: 116 (27 Sep 2023 16:09) (65 - 117)  BP: 121/82 (27 Sep 2023 15:00) (82/59 - 136/69)  BP(mean): 93 (27 Sep 2023 15:00) (65 - 93)  RR: 23 (27 Sep 2023 15:00) (18 - 27)  SpO2: 87% (27 Sep 2023 15:00) (87% - 100%)    Parameters below as of 27 Sep 2023 15:00  Patient On (Oxygen Delivery Method): ventilator    O2 Concentration (%): 100    General Exam:   General appearance: No acute distress                 Cardiovascular: Pedal dorsalis pulses intact bilaterally    Neurological Exam:  Mental Status: A&Ox0, intubated, not sedated, pt. agonally breathing     Cranial Nerves: Pupils reactive to light b/l, when preforming doll's eyes right eye moved but not the left eye.    Motor: pt not withdrawing to pain in b/l LE and UE, no plantar reflex noted    09-27    140  |  107  |  52<H>  ----------------------------<  148<H>  4.1   |  21<L>  |  1.70<H>    Ca    9.3      27 Sep 2023 03:40  Phos  2.9     09-27  Mg     2.0     09-27    TPro  6.5  /  Alb  3.4<L>  /  TBili  3.8<H>  /  DBili  x   /  AST  13  /  ALT  11  /  AlkPhos  93  09-27    LIVER FUNCTIONS - ( 27 Sep 2023 03:40 )  Alb: 3.4 g/dL / Pro: 6.5 g/dL / ALK PHOS: 93 U/L / ALT: 11 U/L DA / AST: 13 U/L / GGT: x             Radiology    EKG:  tele:  TTE:  EEG:              Neurology Follow up note    Name  KETAN TIPTON    HPI:  83 year old  Man  from home, ambulates with walker with  hypothyroidism, alcoholic cirrhosis, duodenal ulcers, AVM's, CKD, GBS, 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 Found to have Sepsis secondary to Spontaneous Bacterial Peritonitis (confirmed on 9/15 with Diagnostic Para > 9000 Neutrophils). Peritoneal culture +streptococcus constellatus (9/15), blood cultures +staphylococcus capitis (9/15), completed piperacillin-tazobactam 9/24. Repeat blood cultures 9/23 NGTD. Admitted to ICU for AHRF requiring intubation for airway protection.     Interval History -  Neurology consulted for worsening mental status change. Pt initially A&Ox3, currently off all sedation since 9/18. CTH 9/23 NEG. Spot EEG 9/25: Moderate to severe  nonspecific diffuse or multifocal cerebral dysfunction. No seizures. Repeat CTH negative: chronic R maxillary sinus opacifications w/ central funcal overgrowth and /or inpissated secretions. Pending MRI non-con today. Spoke with family at beside about pt's poor prognosis.       Subjective:    Review of Systems:  Constitutional: No generalized weakness. No fevers or chills.                    Eyes, Ears, Mouth, Throat: No vision loss   Respiratory: No shortness of breath or cough.                                Cardiovascular: No chest pain or palpitations  Gastrointestinal: No nausea or vomiting.                                         Genitourinary: No urinary incontinence or burning on urination.  Musculoskeletal: No joint pain.                                                           Dermatologic: No rash.  Neurological: as per HPI                                                                      Psychiatric: No behavioral problems.  Endocrine: No known hypoglycemia.               Hematologic/Lymphatic: No easy bleeding.    MEDICATIONS  (STANDING):  albumin human 25% IVPB 50 milliLiter(s) IV Intermittent every 6 hours  cefTRIAXone   IVPB 1000 milliGRAM(s) IV Intermittent every 24 hours  chlorhexidine 0.12% Liquid 15 milliLiter(s) Oral Mucosa every 12 hours  chlorhexidine 2% Cloths 1 Application(s) Topical daily  midodrine 5 milliGRAM(s) Oral every 8 hours  octreotide  Injectable 200 MICROGram(s) SubCutaneous three times a day  pantoprazole  Injectable 40 milliGRAM(s) IV Push daily  rifAXIMin 550 milliGRAM(s) Oral two times a day    MEDICATIONS  (PRN):  acetaminophen     Tablet .. 650 milliGRAM(s) Oral every 6 hours PRN Temp greater or equal to 38C (100.4F), Mild Pain (1 - 3)  sodium chloride 0.9% lock flush 10 milliLiter(s) IV Push every 1 hour PRN Pre/post blood products, medications, blood draw, and to maintain line patency      Allergies    No Known Allergies    Intolerances        Objective:   Vital Signs Last 24 Hrs  T(C): 36.2 (27 Sep 2023 15:00), Max: 36.2 (27 Sep 2023 15:00)  T(F): 97.2 (27 Sep 2023 15:00), Max: 97.2 (27 Sep 2023 15:00)  HR: 116 (27 Sep 2023 16:09) (65 - 117)  BP: 121/82 (27 Sep 2023 15:00) (82/59 - 136/69)  BP(mean): 93 (27 Sep 2023 15:00) (65 - 93)  RR: 23 (27 Sep 2023 15:00) (18 - 27)  SpO2: 87% (27 Sep 2023 15:00) (87% - 100%)    Parameters below as of 27 Sep 2023 15:00  Patient On (Oxygen Delivery Method): ventilator    O2 Concentration (%): 100    General Exam:   General appearance: No acute distress                 Cardiovascular: Pedal dorsalis pulses intact bilaterally    Neurological Exam:  Mental Status: A&Ox0, intubated, not sedated, pt. agonally breathing     Cranial Nerves: Pupils reactive to light b/l, when preforming doll's eyes right eye moved but not the left eye.    Motor: pt not withdrawing to pain in b/l LE and UE, no plantar reflex noted    09-27    140  |  107  |  52<H>  ----------------------------<  148<H>  4.1   |  21<L>  |  1.70<H>    Ca    9.3      27 Sep 2023 03:40  Phos  2.9     09-27  Mg     2.0     09-27    TPro  6.5  /  Alb  3.4<L>  /  TBili  3.8<H>  /  DBili  x   /  AST  13  /  ALT  11  /  AlkPhos  93  09-27    LIVER FUNCTIONS - ( 27 Sep 2023 03:40 )  Alb: 3.4 g/dL / Pro: 6.5 g/dL / ALK PHOS: 93 U/L / ALT: 11 U/L DA / AST: 13 U/L / GGT: x             Radiology  EEG:      EEG Classification / Summary:    Abnormal EEG in the comatose patient.  Quasiperiodic frontal maximal delta with triphasic morphology near 0,5hz at times  Moderate diffuse cerebral slowing    Clinical Impression:  Moderate to severe  nonspecific diffuse or multifocal cerebral dysfunction.   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 Found to have Sepsis secondary to Spontaneous Bacterial Peritonitis (confirmed on 9/15 with Diagnostic Para > 9000 Neutrophils). Peritoneal culture +streptococcus constellatus (9/15), blood cultures +staphylococcus capitis (9/15), completed piperacillin-tazobactam 9/24. Repeat blood cultures 9/23 NGTD. Admitted to ICU for AHRF requiring intubation for airway protection.     Interval History -  Neurology consulted for worsening mental status change. Pt initially A&Ox3, currently off all sedation since 9/18. CTH 9/23 NEG. Spot EEG 9/25: Moderate to severe  nonspecific diffuse or multifocal cerebral dysfunction. No seizures. Repeat CTH negative: chronic R maxillary sinus opacifications w/ central funcal overgrowth and /or inpissated secretions. Pending MRI non-con today. Spoke with family at beside about pt's poor prognosis.       Subjective:    Review of Systems:  Constitutional: No generalized weakness. No fevers or chills.                    Eyes, Ears, Mouth, Throat: No vision loss   Respiratory: No shortness of breath or cough.                                Cardiovascular: No chest pain or palpitations  Gastrointestinal: No nausea or vomiting.                                         Genitourinary: No urinary incontinence or burning on urination.  Musculoskeletal: No joint pain.                                                           Dermatologic: No rash.  Neurological: as per HPI                                                                      Psychiatric: No behavioral problems.  Endocrine: No known hypoglycemia.               Hematologic/Lymphatic: No easy bleeding.    MEDICATIONS  (STANDING):  albumin human 25% IVPB 50 milliLiter(s) IV Intermittent every 6 hours  cefTRIAXone   IVPB 1000 milliGRAM(s) IV Intermittent every 24 hours  chlorhexidine 0.12% Liquid 15 milliLiter(s) Oral Mucosa every 12 hours  chlorhexidine 2% Cloths 1 Application(s) Topical daily  midodrine 5 milliGRAM(s) Oral every 8 hours  octreotide  Injectable 200 MICROGram(s) SubCutaneous three times a day  pantoprazole  Injectable 40 milliGRAM(s) IV Push daily  rifAXIMin 550 milliGRAM(s) Oral two times a day    MEDICATIONS  (PRN):  acetaminophen     Tablet .. 650 milliGRAM(s) Oral every 6 hours PRN Temp greater or equal to 38C (100.4F), Mild Pain (1 - 3)  sodium chloride 0.9% lock flush 10 milliLiter(s) IV Push every 1 hour PRN Pre/post blood products, medications, blood draw, and to maintain line patency      Allergies    No Known Allergies    Intolerances        Objective:   Vital Signs Last 24 Hrs  T(C): 36.2 (27 Sep 2023 15:00), Max: 36.2 (27 Sep 2023 15:00)  T(F): 97.2 (27 Sep 2023 15:00), Max: 97.2 (27 Sep 2023 15:00)  HR: 116 (27 Sep 2023 16:09) (65 - 117)  BP: 121/82 (27 Sep 2023 15:00) (82/59 - 136/69)  BP(mean): 93 (27 Sep 2023 15:00) (65 - 93)  RR: 23 (27 Sep 2023 15:00) (18 - 27)  SpO2: 87% (27 Sep 2023 15:00) (87% - 100%)    Parameters below as of 27 Sep 2023 15:00  Patient On (Oxygen Delivery Method): ventilator    O2 Concentration (%): 100    General Exam:   General appearance: No acute distress                 Cardiovascular: Pedal dorsalis pulses intact bilaterally    Neurological Exam:  Mental Status: A&Ox0, intubated, not sedated, agonal respiration    Cranial Nerves: Pupils reactive to light b/l, when preforming doll's eyes left eye moved but not the right eye.    Motor: pt not withdrawing to pain in b/l LE and UE, no plantar reflex noted    09-27    140  |  107  |  52<H>  ----------------------------<  148<H>  4.1   |  21<L>  |  1.70<H>    Ca    9.3      27 Sep 2023 03:40  Phos  2.9     09-27  Mg     2.0     09-27    TPro  6.5  /  Alb  3.4<L>  /  TBili  3.8<H>  /  DBili  x   /  AST  13  /  ALT  11  /  AlkPhos  93  09-27    LIVER FUNCTIONS - ( 27 Sep 2023 03:40 )  Alb: 3.4 g/dL / Pro: 6.5 g/dL / ALK PHOS: 93 U/L / ALT: 11 U/L DA / AST: 13 U/L / GGT: x             Radiology  EEG:      EEG Classification / Summary:    Abnormal EEG in the comatose patient.  Quasiperiodic frontal maximal delta with triphasic morphology near 0,5hz at times  Moderate diffuse cerebral slowing    Clinical Impression:  Moderate to severe  nonspecific diffuse or multifocal cerebral dysfunction.   No seizures

## 2023-09-27 NOTE — PROGRESS NOTE ADULT - PROBLEM SELECTOR PLAN 4
Completed course of IV Zosyn, now on IV Rocephin for prophylaxis  Rifaximin added  Further management as per ICU team

## 2023-09-27 NOTE — PROGRESS NOTE ADULT - SUBJECTIVE AND OBJECTIVE BOX
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 Pt a/w septic shock 2/2 SBP/ Bacteremia & decompensated liver cirrhosis. Nephrology consulted for Elevated serum creatinine.  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    No Known Allergies      Hospital Medications: Medications reviewed    VITALS:  T(F): 97.2 (09-27-23 @ 15:00), Max: 97.2 (09-27-23 @ 15:00)  HR: 116 (09-27-23 @ 16:09)  BP: 121/82 (09-27-23 @ 15:00)  RR: 23 (09-27-23 @ 15:00)  SpO2: 87% (09-27-23 @ 15:00)  Wt(kg): --    09-26 @ 07:01  -  09-27 @ 07:00  --------------------------------------------------------  IN: 560 mL / OUT: 650 mL / NET: -90 mL    09-27 @ 07:01  -  09-27 @ 16:28  --------------------------------------------------------  IN: 425 mL / OUT: 110 mL / NET: 315 mL      PHYSICAL EXAM:  Gen: NAD, intubated  Cards: RRR, +S1/S2, no M/G/R  Resp: course BS B/L  GI: soft, NT, + ab distention, NABS  : + rodriguez  Vascular:  + anasarca      LABS:  09-27    140  |  107  |  52<H>  ----------------------------<  148<H>  4.1   |  21<L>  |  1.70<H>    Ca    9.3      27 Sep 2023 03:40  Phos  2.9     09-27  Mg     2.0     09-27    TPro  6.5  /  Alb  3.4<L>  /  TBili  3.8<H>  /  DBili      /  AST  13  /  ALT  11  /  AlkPhos  93  09-27    Creatinine Trend: 1.70 <--, 1.78 <--, 2.08 <--, 2.00 <--, 2.12 <--, 2.08 <--, 2.02 <--                        7.8    14.38 )-----------( 109      ( 27 Sep 2023 03:40 )             22.1     Urine Studies:  Urinalysis Basic - ( 27 Sep 2023 03:40 )    Color:  / Appearance:  / SG:  / pH:   Gluc: 148 mg/dL / Ketone:   / Bili:  / Urobili:    Blood:  / Protein:  / Nitrite:    Leuk Esterase:  / RBC:  / WBC    Sq Epi:  / Non Sq Epi:  / Bacteria:       Sodium, Random Urine: <5 mmol/L (09-23 @ 14:26)  Osmolality, Random Urine: 373 mos/kg (09-23 @ 14:26)  Potassium, Random Urine: 40 mmol/L (09-23 @ 14:26)  Creatinine, Random Urine: 71 mg/dL (09-23 @ 14:26)

## 2023-09-27 NOTE — PROGRESS NOTE ADULT - ASSESSMENT
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  Pt initially A&Ox3 at baseline, currently off all sedation since 9/18, with worsening neurologic status. Pt. initially thought to have new left hemiplegia due to new cerebral infarct, however, pt showing signs of global encephalopathy, likely infectious in nature s/p SBP. Ammonia level wnl. CTH 9/23 NEG. Spot EEG 9/25: Moderate to severe  nonspecific diffuse or multifocal cerebral dysfunction. No seizures. Repeat CTH negative: chronic R maxillary sinus opacifications w/ central funcal overgrowth and /or inpissated secretions. Pending MRI non-con today. Spoke with family at beside about pt's poor prognosis.    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  Pt initially A&Ox3 at baseline, currently off all sedation since 9/18, with worsening neurologic status. Pt. initially thought to have new left hemiplegia due to new cerebral infarct, however, pt showing signs of global encephalopathy, likely infectious in nature s/p SBP. Ammonia level wnl. CTH 9/23 NEG. Spot EEG 9/25: Moderate to severe  nonspecific diffuse or multifocal cerebral dysfunction. No seizures. Repeat CTH negative: chronic R maxillary sinus opacifications w/ central funcal overgrowth and /or inpissated secretions. Pending MRI non-con today. Spoke with family at beside about pt's poor prognosis.

## 2023-09-27 NOTE — PROGRESS NOTE ADULT - PROBLEM SELECTOR PLAN 2
Likely in setting of decompensated liver cirrhosis, now exacerbated by worsening KAY on CKD and likely hepatorenal syndrome.    Hepatology and Nephrology input appreciated  Continue midodrine, NG feeds, and IV octreotide infusion  CT of head negative x 2, EEG negative for seizure activity (+ diffuse cerebral slowing)  Patient now agonal on vent, appears to be in terminal respiratory failure--as noted above, family wishes to proceed with MRI      Further management as per Neurology and ICU team.

## 2023-09-27 NOTE — PROGRESS NOTE ADULT - SUBJECTIVE AND OBJECTIVE BOX
Chief Complaint:  Patient is a 83y old  Male who presents with a chief complaint of Sepsis (27 Sep 2023 13:05)      Reason for consult:    Interval Events:     Hospital Medications:  acetaminophen     Tablet .. 650 milliGRAM(s) Oral every 6 hours PRN  albumin human 25% IVPB 50 milliLiter(s) IV Intermittent every 6 hours  cefTRIAXone   IVPB 1000 milliGRAM(s) IV Intermittent every 24 hours  chlorhexidine 0.12% Liquid 15 milliLiter(s) Oral Mucosa every 12 hours  chlorhexidine 2% Cloths 1 Application(s) Topical daily  midodrine 5 milliGRAM(s) Oral every 8 hours  octreotide  Injectable 200 MICROGram(s) SubCutaneous three times a day  pantoprazole  Injectable 40 milliGRAM(s) IV Push daily  rifAXIMin 550 milliGRAM(s) Oral two times a day  sodium chloride 0.9% lock flush 10 milliLiter(s) IV Push every 1 hour PRN      ROS:   General:  No  fevers, chills, night sweats, fatigue  Eyes:  Good vision, no reported pain  ENT:  No sore throat, pain, runny nose  CV:  No pain, palpitations  Pulm:  No dyspnea, cough  GI:  See HPI, otherwise negative  :  No  incontinence, nocturia  Muscle:  No pain, weakness  Neuro:  No memory problems  Psych:  No insomnia, mood problems, depression  Endocrine:  No polyuria, polydipsia, cold/heat intolerance  Heme:  No petechiae, ecchymosis, easy bruisability  Skin:  No rash    PHYSICAL EXAM:   Vital Signs:  Vital Signs Last 24 Hrs  T(C): 35.8 (27 Sep 2023 12:00), Max: 36 (26 Sep 2023 17:53)  T(F): 96.5 (27 Sep 2023 12:00), Max: 96.8 (26 Sep 2023 17:53)  HR: 93 (27 Sep 2023 12:00) (65 - 116)  BP: 120/68 (27 Sep 2023 12:00) (95/59 - 136/69)  BP(mean): 85 (27 Sep 2023 12:00) (65 - 88)  RR: 22 (27 Sep 2023 12:00) (18 - 27)  SpO2: 88% (27 Sep 2023 12:00) (88% - 100%)      Daily     Daily     GENERAL: no acute distress  NEURO: alert, no asterixis  HEENT: anicteric sclera, no conjunctival pallor appreciated  CHEST: no respiratory distress, no accessory muscle use  CARDIAC: regular rate, rhythm  ABDOMEN: soft, non-tender, non-distended, no rebound or guarding  EXTREMITIES: warm, well perfused, no edema  SKIN: no lesions noted    LABS: reviewed                        7.8    14.38 )-----------( 109      ( 27 Sep 2023 03:40 )             22.1     09-27    140  |  107  |  52<H>  ----------------------------<  148<H>  4.1   |  21<L>  |  1.70<H>    Ca    9.3      27 Sep 2023 03:40  Phos  2.9     09-27  Mg     2.0     09-27    TPro  6.5  /  Alb  3.4<L>  /  TBili  3.8<H>  /  DBili  x   /  AST  13  /  ALT  11  /  AlkPhos  93  09-27    LIVER FUNCTIONS - ( 27 Sep 2023 03:40 )  Alb: 3.4 g/dL / Pro: 6.5 g/dL / ALK PHOS: 93 U/L / ALT: 11 U/L DA / AST: 13 U/L / GGT: x             Interval Diagnostic Studies: see sunrise for full report   Chief Complaint:  Patient is a 83y old  Male who presents with a chief complaint of Sepsis (27 Sep 2023 13:05)      Reason for consult:    Interval Events: Patient was seen and examined at bedside. Mental status remains poor, Patient is pending an MRI brain. On going [facility] conversation with family amid poor prognosis.     Hospital Medications:  acetaminophen     Tablet .. 650 milliGRAM(s) Oral every 6 hours PRN  albumin human 25% IVPB 50 milliLiter(s) IV Intermittent every 6 hours  cefTRIAXone   IVPB 1000 milliGRAM(s) IV Intermittent every 24 hours  chlorhexidine 0.12% Liquid 15 milliLiter(s) Oral Mucosa every 12 hours  chlorhexidine 2% Cloths 1 Application(s) Topical daily  midodrine 5 milliGRAM(s) Oral every 8 hours  octreotide  Injectable 200 MICROGram(s) SubCutaneous three times a day  pantoprazole  Injectable 40 milliGRAM(s) IV Push daily  rifAXIMin 550 milliGRAM(s) Oral two times a day  sodium chloride 0.9% lock flush 10 milliLiter(s) IV Push every 1 hour PRN      ROS:   Unable to obtain due to patient condition.     PHYSICAL EXAM:   Vital Signs:  Vital Signs Last 24 Hrs  T(C): 35.8 (27 Sep 2023 12:00), Max: 36 (26 Sep 2023 17:53)  T(F): 96.5 (27 Sep 2023 12:00), Max: 96.8 (26 Sep 2023 17:53)  HR: 93 (27 Sep 2023 12:00) (65 - 116)  BP: 120/68 (27 Sep 2023 12:00) (95/59 - 136/69)  BP(mean): 85 (27 Sep 2023 12:00) (65 - 88)  RR: 22 (27 Sep 2023 12:00) (18 - 27)  SpO2: 88% (27 Sep 2023 12:00) (88% - 100%)      Daily     Daily     GENERAL: ventilated, unarousable.   NEURO: AAOx0, not responding to pain or verbal stimuli.   HEENT: anicteric sclera, no conjunctival pallor appreciated,   CHEST: patient noted to be in some respiratory distress, agonal breathing while ventilated.  CARDIAC: regular rate, rhythm  ABDOMEN: soft, non-tender, non-distended, no rebound or guarding  EXTREMITIES: warm, well perfused, no edema    LABS: reviewed                        7.8    14.38 )-----------( 109      ( 27 Sep 2023 03:40 )             22.1     09-27    140  |  107  |  52<H>  ----------------------------<  148<H>  4.1   |  21<L>  |  1.70<H>    Ca    9.3      27 Sep 2023 03:40  Phos  2.9     09-27  Mg     2.0     09-27    TPro  6.5  /  Alb  3.4<L>  /  TBili  3.8<H>  /  DBili  x   /  AST  13  /  ALT  11  /  AlkPhos  93  09-27    LIVER FUNCTIONS - ( 27 Sep 2023 03:40 )  Alb: 3.4 g/dL / Pro: 6.5 g/dL / ALK PHOS: 93 U/L / ALT: 11 U/L DA / AST: 13 U/L / GGT: x             Interval Diagnostic Studies: see sunrise for full report

## 2023-09-27 NOTE — CONSULT NOTE ADULT - SUBJECTIVE AND OBJECTIVE BOX
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 As per wifehe has no  cough, runny nose. Patient has been having black stools for the past 2 days  with dark-colored urine. He also endorse chest pain, midsternal  Denies any hematemesis, fevers, shortness of breath, N/V/D.      Called to see and eval 83 y.o. M for trach/PEG placement. Pt was intubated by ICU team 9/16/23. Pt was off sedation 9/20/23, however pt has since showed no mentation. Currently pt at PEEP 5 and FiO2 of 35%. Per ICU team, trach/PEG offered to family but family has yet to agree to procedures.    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)      Pancreatitis  2/2 choledocholithiasis s/p ERCP with stone extraction and plastic stent placement (11/2019, stent now removed)      Hematochezia  2/2 colonic AVMs s/p cauterization and hemorrhoids (11/2019)      Melena  2/2 duodenal ulcers s/p argon plasma coagulation (4/2020)      Chronic kidney disease (CKD)      GERD (gastroesophageal reflux disease)      Hypothyroidism      H/O inguinal hernia repair      History of repair of hip fracture  R hip      History of hip replacement  10/2020      H/O spinal stenosis    MEDICATIONS  (STANDING):  albumin human 25% IVPB 50 milliLiter(s) IV Intermittent every 6 hours  cefTRIAXone   IVPB 1000 milliGRAM(s) IV Intermittent every 24 hours  chlorhexidine 0.12% Liquid 15 milliLiter(s) Oral Mucosa every 12 hours  chlorhexidine 2% Cloths 1 Application(s) Topical daily  midodrine 5 milliGRAM(s) Oral every 8 hours  octreotide  Injectable 200 MICROGram(s) SubCutaneous three times a day  pantoprazole  Injectable 40 milliGRAM(s) IV Push daily  rifAXIMin 550 milliGRAM(s) Oral two times a day    MEDICATIONS  (PRN):  acetaminophen     Tablet .. 650 milliGRAM(s) Oral every 6 hours PRN Temp greater or equal to 38C (100.4F), Mild Pain (1 - 3)  sodium chloride 0.9% lock flush 10 milliLiter(s) IV Push every 1 hour PRN Pre/post blood products, medications, blood draw, and to maintain line patency    Allergies    No Known Allergies    Vital Signs Last 24 Hrs  T(C): 35.8 (27 Sep 2023 12:00), Max: 36 (26 Sep 2023 17:53)  T(F): 96.5 (27 Sep 2023 12:00), Max: 96.8 (26 Sep 2023 17:53)  HR: 93 (27 Sep 2023 12:00) (65 - 116)  BP: 120/68 (27 Sep 2023 12:00) (95/59 - 136/69)  BP(mean): 85 (27 Sep 2023 12:00) (65 - 88)  RR: 22 (27 Sep 2023 12:00) (18 - 27)  SpO2: 88% (27 Sep 2023 12:00) (88% - 100%)    Physical:  Gen: Intubated. NAD  Neck: Kyphotic  Abd: No scars noted. Soft, mildly distended    I&O's Detail    26 Sep 2023 07:01  -  27 Sep 2023 07:00  --------------------------------------------------------  IN:    Enteral Tube Flush: 90 mL    Jevity 1.5: 450 mL    Octreotide: 20 mL  Total IN: 560 mL    OUT:    Indwelling Catheter - Urethral (mL): 650 mL  Total OUT: 650 mL    Total NET: -90 mL      27 Sep 2023 07:01  -  27 Sep 2023 13:36  --------------------------------------------------------  IN:  Total IN: 0 mL    OUT:    Indwelling Catheter - Urethral (mL): 25 mL  Total OUT: 25 mL    Total NET: -25 mL    LABS:                        7.8    14.38 )-----------( 109      ( 27 Sep 2023 03:40 )             22.1              09-27    140  |  107  |  52<H>  ----------------------------<  148<H>  4.1   |  21<L>  |  1.70<H>    Ca    9.3      27 Sep 2023 03:40  Phos  2.9     09-27  Mg     2.0     09-27    TPro  6.5  /  Alb  3.4<L>  /  TBili  3.8<H>  /  DBili  x   /  AST  13  /  ALT  11  /  AlkPhos  93  09-27              Urinalysis Basic - ( 27 Sep 2023 03:40 )    Color: x / Appearance: x / SG: x / pH: x  Gluc: 148 mg/dL / Ketone: x  / Bili: x / Urobili: x   Blood: x / Protein: x / Nitrite: x   Leuk Esterase: x / RBC: x / WBC x   Sq Epi: x / Non Sq Epi: x / Bacteria: x    RADIOLOGY & ADDITIONAL STUDIES:  < from: CT Chest w/ IV Cont (09.15.23 @ 15:59) >  IMPRESSION:  Cirrhosis.  Exophytic mass posterior liver dome with apparent extension to the   adjacent diaphragm. Consider hepatoma. May obtain further evaluation with   MRI as clinically indicated  Gross ascites.  Patent portal and hepatic veins    < end of copied text >

## 2023-09-27 NOTE — PROGRESS NOTE ADULT - SUBJECTIVE AND OBJECTIVE BOX
INTERVAL HPI/OVERNIGHT EVENTS: No acute overnight events. Pt seen at bedside, NAD, AAOx0, not following commands. Unable to assess ROS due to mental status    PRESSORS: [ ] YES [x] NO  WHICH:    Antimicrobial:  cefTRIAXone   IVPB 1000 milliGRAM(s) IV Intermittent every 24 hours  rifAXIMin 550 milliGRAM(s) Oral two times a day    Cardiovascular:  midodrine 5 milliGRAM(s) Oral every 8 hours    Pulmonary:    Hematalogic:    Other:  acetaminophen     Tablet .. 650 milliGRAM(s) Oral every 6 hours PRN  chlorhexidine 0.12% Liquid 15 milliLiter(s) Oral Mucosa every 12 hours  chlorhexidine 2% Cloths 1 Application(s) Topical daily  octreotide  Injectable 200 MICROGram(s) SubCutaneous three times a day  pantoprazole  Injectable 40 milliGRAM(s) IV Push daily  sodium chloride 0.9% lock flush 10 milliLiter(s) IV Push every 1 hour PRN    acetaminophen     Tablet .. 650 milliGRAM(s) Oral every 6 hours PRN  cefTRIAXone   IVPB 1000 milliGRAM(s) IV Intermittent every 24 hours  chlorhexidine 0.12% Liquid 15 milliLiter(s) Oral Mucosa every 12 hours  chlorhexidine 2% Cloths 1 Application(s) Topical daily  midodrine 5 milliGRAM(s) Oral every 8 hours  octreotide  Injectable 200 MICROGram(s) SubCutaneous three times a day  pantoprazole  Injectable 40 milliGRAM(s) IV Push daily  rifAXIMin 550 milliGRAM(s) Oral two times a day  sodium chloride 0.9% lock flush 10 milliLiter(s) IV Push every 1 hour PRN    Drug Dosing Weight  Height (cm): 170.2 (15 Sep 2023 11:37)  Weight (kg): 64.7 (16 Sep 2023 16:30)  BMI (kg/m2): 22.3 (16 Sep 2023 16:30)  BSA (m2): 1.75 (16 Sep 2023 16:30)    CENTRAL LINE: [ ] YES [ ] NO  LOCATION:   DATE INSERTED:  REMOVE: [ ] YES [ ] NO  EXPLAIN:    [place]: [ ] YES [ ] NO    DATE INSERTED:  REMOVE:  [ ] YES [ ] NO  EXPLAIN:    A-LINE:  [ ] YES [ ] NO  LOCATION:   DATE INSERTED:  REMOVE:  [ ] YES [ ] NO  EXPLAIN:    PMH -reviewed admission note, no change since admission  PAST MEDICAL & SURGICAL HISTORY:  [place] syndrome  1996 after 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      Liver cirrhosis  alcoholic/WALL cirrhosis c/b variceal bleed s/p banding (8/2018) and hepatic encephalopathy (several years ago)      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)      Pancreatitis  2/2 choledocholithiasis s/p ERCP with stone extraction and plastic stent placement (11/2019, stent now removed)      Hematochezia  2/2 colonic AVMs s/p cauterization and hemorrhoids (11/2019)      Melena  2/2 duodenal ulcers s/p argon plasma coagulation (4/2020)      Chronic kidney disease (CKD)      GERD (gastroesophageal reflux disease)      Hypothyroidism      H/O inguinal hernia repair      History of repair of hip fracture  R hip      History of hip replacement  10/2020      H/O spinal stenosis          ICU Vital Signs Last 24 Hrs  T(C): 35.6 (27 Sep 2023 05:20), Max: 36.2 (26 Sep 2023 13:00)  T(F): 96 (27 Sep 2023 05:20), Max: 97.2 (26 Sep 2023 13:00)  HR: 82 (27 Sep 2023 08:00) (65 - 116)  BP: 136/69 (27 Sep 2023 08:00) (95/59 - 136/69)  BP(mean): 88 (27 Sep 2023 08:00) (65 - 88)  ABP: --  ABP(mean): --  RR: 20 (27 Sep 2023 08:00) (18 - 25)  SpO2: 100% (27 Sep 2023 08:00) (99% - 100%)              09-26 @ 07:01  -  09-27 @ 07:00  --------------------------------------------------------  IN: 560 mL / OUT: 650 mL / NET: -90 mL        Mode: AC/ CMV (Assist Control/ Continuous Mandatory Ventilation)  RR (machine): 16  TV (machine): 400  FiO2: 35  PEEP: 5  ITime: 1  MAP: 10  PIP: 25      PHYSICAL EXAM:    GENERAL: NAD, minimally responsive to voice, AAOX0, not following commands  HEAD:  Atraumatic, Normocephalic  EYES: EOMI, PERRLA, 3mm and brisk, conjunctiva and sclera clear  NECK: Supple, normal appearance, No JVD; Normal thyroid; Trachea midline  NERVOUS SYSTEM:  Alert & Oriented X0, no effort to move noted, limited passive ROM  CHEST/LUNG: diminished to auscultation bilaterally; No rales, rhonchi, wheezing   HEART: Regular rate and rhythm; No murmurs, rubs, or gallops  ABDOMEN: Distended, soft, hypoactive bowel sounds  EXTREMITIES:  2+ Peripheral Pulses, No clubbing, cyanosis, +2 generalized edema  SKIN: No rashes or lesions;  brisk capillary refill, UE ecchymosis noted, weeping upper       LABS:  CBC Full  -  ( 27 Sep 2023 03:40 )  WBC Count : 14.38 K/uL  RBC Count : 2.79 M/uL  Hemoglobin : 7.8 g/dL  Hematocrit : 22.1 %  Platelet Count - Automated : 109 K/uL  Mean Cell Volume : 79.2 fl  Mean Cell Hemoglobin : 28.0 pg  Mean Cell Hemoglobin Concentration : 35.3 gm/dL  Auto Neutrophil # : x  Auto Lymphocyte # : x  Auto Monocyte # : x  Auto Eosinophil # : x  Auto Basophil # : x  Auto Neutrophil % : x  Auto Lymphocyte % : x  Auto Monocyte % : x  Auto Eosinophil % : x  Auto Basophil % : x    09-27    140  |  107  |  52<H>  ----------------------------<  148<H>  4.1   |  21<L>  |  1.70<H>    Ca    9.3      27 Sep 2023 03:40  Phos  2.9     09-27  Mg     2.0     09-27    TPro  6.5  /  Alb  3.4<L>  /  TBili  3.8<H>  /  DBili  x   /  AST  13  /  ALT  11  /  AlkPhos  93  09-27      Urinalysis Basic - ( 27 Sep 2023 03:40 )    Color: x / Appearance: x / SG: x / pH: x  Gluc: 148 mg/dL / Ketone: x  / Bili: x / Urobili: x   Blood: x / Protein: x / Nitrite: x   Leuk Esterase: x / RBC: x / WBC x   Sq Epi: x / Non Sq Epi: x / Bacteria: x          RADIOLOGY & ADDITIONAL STUDIES REVIEWED:  ***    [ ]GOALS OF CARE DISCUSSION WITH PATIENT/FAMILY/PROXY:    CRITICAL CARE TIME SPENT: 35 minutes

## 2023-09-27 NOTE — PROGRESS NOTE ADULT - ASSESSMENT
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 Pt a/w septic shock 2/2 SBP/ Bacteremia & decompensated liver cirrhosis. Nephrology consulted for Elevated serum creatinine.  s/p CT abd/ pelvis with IV contrast 9/15 with liver mass, and intraductal mass superior to pancreatic head    1. KAY- shaylee SCr 0.7-1. KAY in the setting of cirrhosis; likely due to HRS >> LEA (s/p CT with IV contrast on 9/15) with hypotension. UA trace protein, large blood and mod LE. FeNa 0.35% s/p albumin x 2 days (9/19-9/21) with no renal improvement (less likely pre-renal).    Pt now on tx for HRS with midodrine 5mg PO q8hrs, Octreotide gtt changed to 200mcg SC tid and IV albumin. Scr improving with suboptimal UO. If no improvement, can consider changing midodrine/octreotide to levo gtt and keeping MAP > 80.  Check LDH/haptoglobin r/o TMA.  FeNa low. Renal US with no hydro. c/w current tx. Strict I/Os. Avoid nephrotoxins/ NSAIDs/ RCA. Monitor BMP.  Patient with low serum CO2 but with mixed disorder and normal pH. No need for sodium bicarbonate.    2. Septic shock- due to spontaneous bacterial peritonitis and Staph capitis bacteremia. Plan as per ICU/ ID.     3. Hypotension- BP improved. Pt off IV pressors. Currently on Midodrine 5mg PO tid. Monitor BP    4. Cirrhosis - with liver mass. Plan as per Hepatology.        [facility] NEPHROLOGY  Aubrey Nguyen M.D.  Tod Brewer D.O.  Thalia Oshea M.D.  MD Charlene Monae, MSN, ANP-C    Telephone: [phone]  Facsimile: [phone]    [address] [place] [address], [address]  [place] [address]

## 2023-09-28 NOTE — PROGRESS NOTE ADULT - NUTRITIONAL ASSESSMENT
This patient has been assessed with a concern for Malnutrition and has been determined to have a diagnosis/diagnoses of Moderate protein-calorie malnutrition.    This patient is being managed with:   Diet NPO with Tube Feed-  Tube Feeding Modality: Nasogastric  Jevity 1.5 Hakeem  Total Volume for 24 Hours (mL): 1080  Continuous  Starting Tube Feed Rate {mL per Hour}: 10  Increase Tube Feed Rate by (mL): 10     Every 4 hours  Until Goal Tube Feed Rate (mL per Hour): 45  Tube Feed Duration (in Hours): 24  Tube Feed Start Time: 18:00   Frequency: Every 4 Hours  No Carb Prosource (1pkg = 15gms Protein)     Qty per Day:  1  Entered: Sep 21 2023  6:58PM  
This patient has been assessed with a concern for Malnutrition and has been determined to have a diagnosis/diagnoses of Moderate protein-calorie malnutrition.    This patient is being managed with:   Diet NPO with Tube Feed-  Tube Feeding Modality: Nasogastric  Jevity 1.5 Hakeem  Total Volume for 24 Hours (mL): 1080  Continuous  Starting Tube Feed Rate {mL per Hour}: 10  Increase Tube Feed Rate by (mL): 10     Every 4 hours  Until Goal Tube Feed Rate (mL per Hour): 45  Tube Feed Duration (in Hours): 24  Tube Feed Start Time: 18:00  Free Water Flush   Total Volume per Flush (mL): 250   Frequency: Every 4 Hours  No Carb Prosource (1pkg = 15gms Protein)     Qty per Day:  1  Entered: Sep 18 2023  5:57PM  
This patient has been assessed with a concern for Malnutrition and has been determined to have a diagnosis/diagnoses of Moderate protein-calorie malnutrition.    This patient is being managed with:   Diet NPO with Tube Feed-  Tube Feeding Modality: Nasogastric  Jevity 1.5 Hakeem  Total Volume for 24 Hours (mL): 1080  Continuous  Starting Tube Feed Rate {mL per Hour}: 10  Increase Tube Feed Rate by (mL): 10     Every 4 hours  Until Goal Tube Feed Rate (mL per Hour): 45  Tube Feed Duration (in Hours): 24  Tube Feed Start Time: 18:00   Frequency: Every 4 Hours  No Carb Prosource (1pkg = 15gms Protein)     Qty per Day:  1  Entered: Sep 21 2023  6:58PM  
This patient has been assessed with a concern for Malnutrition and has been determined to have a diagnosis/diagnoses of Moderate protein-calorie malnutrition.    This patient is being managed with:   Diet NPO with Tube Feed-  Tube Feeding Modality: Nasogastric  Jevity 1.5 Hakeem  Total Volume for 24 Hours (mL): 1080  Continuous  Starting Tube Feed Rate {mL per Hour}: 10  Increase Tube Feed Rate by (mL): 10     Every 4 hours  Until Goal Tube Feed Rate (mL per Hour): 45  Tube Feed Duration (in Hours): 24  Tube Feed Start Time: 18:00   Frequency: Every 4 Hours  No Carb Prosource (1pkg = 15gms Protein)     Qty per Day:  1  Entered: Sep 21 2023  6:58PM  
This patient has been assessed with a concern for Malnutrition and has been determined to have a diagnosis/diagnoses of Moderate protein-calorie malnutrition.    This patient is being managed with:   Diet NPO with Tube Feed-  Tube Feeding Modality: Nasogastric  Jevity 1.5 Hakeem  Total Volume for 24 Hours (mL): 1080  Continuous  Starting Tube Feed Rate {mL per Hour}: 10  Increase Tube Feed Rate by (mL): 10     Every 4 hours  Until Goal Tube Feed Rate (mL per Hour): 45  Tube Feed Duration (in Hours): 24  Tube Feed Start Time: 18:00  Free Water Flush   Total Volume per Flush (mL): 250   Frequency: Every 4 Hours  No Carb Prosource (1pkg = 15gms Protein)     Qty per Day:  1  Entered: Sep 18 2023  5:57PM  
This patient has been assessed with a concern for Malnutrition and has been determined to have a diagnosis/diagnoses of Moderate protein-calorie malnutrition.    This patient is being managed with:   Diet NPO with Tube Feed-  Tube Feeding Modality: Nasogastric  Jevity 1.5 Hakeem  Total Volume for 24 Hours (mL): 1080  Continuous  Starting Tube Feed Rate {mL per Hour}: 10  Increase Tube Feed Rate by (mL): 10     Every 4 hours  Until Goal Tube Feed Rate (mL per Hour): 45  Tube Feed Duration (in Hours): 24  Tube Feed Start Time: 18:00  Free Water Flush   Total Volume per Flush (mL): 250   Frequency: Every 4 Hours  No Carb Prosource (1pkg = 15gms Protein)     Qty per Day:  1  Entered: Sep 18 2023  5:57PM  
This patient has been assessed with a concern for Malnutrition and has been determined to have a diagnosis/diagnoses of Moderate protein-calorie malnutrition.    This patient is being managed with:   Diet NPO with Tube Feed-  Tube Feeding Modality: Nasogastric  Jevity 1.5 Hakeem  Total Volume for 24 Hours (mL): 1080  Continuous  Starting Tube Feed Rate {mL per Hour}: 10  Increase Tube Feed Rate by (mL): 10     Every 4 hours  Until Goal Tube Feed Rate (mL per Hour): 45  Tube Feed Duration (in Hours): 24  Tube Feed Start Time: 18:00   Frequency: Every 4 Hours  No Carb Prosource (1pkg = 15gms Protein)     Qty per Day:  1  Entered: Sep 21 2023  6:58PM  
This patient has been assessed with a concern for Malnutrition and has been determined to have a diagnosis/diagnoses of Moderate protein-calorie malnutrition.    This patient is being managed with:   Diet NPO with Tube Feed-  Tube Feeding Modality: Nasogastric  Jevity 1.5 Hakeem  Total Volume for 24 Hours (mL): 1080  Continuous  Starting Tube Feed Rate {mL per Hour}: 10  Increase Tube Feed Rate by (mL): 10     Every 4 hours  Until Goal Tube Feed Rate (mL per Hour): 45  Tube Feed Duration (in Hours): 24  Tube Feed Start Time: 18:00  Free Water Flush   Total Volume per Flush (mL): 250   Frequency: Every 4 Hours  No Carb Prosource (1pkg = 15gms Protein)     Qty per Day:  1  Entered: Sep 18 2023  5:57PM

## 2023-09-28 NOTE — DISCHARGE NOTE FOR THE EXPIRED PATIENT - HOSPITAL COURSE
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 Patient has been having black stools for the past 2 days. Pt initially admitted to the hospital for sepsis secondary to SBP. Pt was admitted to ICU for increased secretions, tachycardia, tachypnea and AHRF with increased WOB. Admitted to ICU for AHRF requiring intubation for airway protection. CTH on  was negative. ID Dr. Peters was conulted. Pt completed full course of zosyn. Peritoneal cultures were positive for streptococcus constellatus (9/15), blood cultures +staphylococcus capitis (9/15). Repeat blood cultures were negative. KAY on CKD noted to  be worsening. Nephrology and Hepatology were consulted. Pt was started on midodrine, octreotide and albumin for suspected HRS. Rifaximin was added. Pt was started on rocephin for prophylaxis. Pt was off sedation since  with poor neuro exam. Palliative was consulted. Neurology Dr. Paula was consulted. EEG was negative for seizures. Repeat CTH on  was negative. Pt was made DNR on  and comfort measures on . Pt  on 17:14 on .  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 Patient has been having black stools for the past 2 days. Pt initially admitted to the hospital for sepsis secondary to SBP. Pt was admitted to ICU for increased secretions, tachycardia, tachypnea and AHRF with increased WOB. Admitted to ICU for AHRF requiring intubation for airway protection. CTH on  was negative. ID Dr. Peters was conulted. Pt completed full course of zosyn. Peritoneal cultures were positive for streptococcus constellatus (9/15), blood cultures +staphylococcus capitis (9/15). Repeat blood cultures were negative. KAY on CKD noted to  be worsening. Nephrology and Hepatology were consulted. Pt was started on midodrine, octreotide and albumin for suspected HRS. Rifaximin was added. Pt was started on rocephin for prophylaxis. Pt was off sedation since  with poor neuro exam. Palliative was consulted. Neurology Dr. Paula was consulted. EEG was negative for seizures. Repeat CTH on  was negative. Pt was made DNR on  and comfort measures on . Pt was palliative extubated at 10:05am on 23. Pt  on 17:14 on 23.

## 2023-09-28 NOTE — PROGRESS NOTE ADULT - ASSESSMENT
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 Patient has been having black stools for the past 2 days. Pt noted to have increased secretions, tachycardia, tachypnea and AHRF with increased WOB. Admitted to ICU for AHRF requiring intubation for airway protection.     # Acute Hypoxic Resp failure   # AMS / Encephalopathy   # Hypotension   # KAY on CKD with possibility for Hepatorenal syndrome   # Alcohol inappropriate use   # Liver cirrhosis   # SBP  # Sepsis       _________CNS___________  -Pt a&ox3 at baseline  -currently intubated, currently off all sedation since 9/18, with poor neuro exam as above  - CTH 9/23 NEG  - Palliative Following   - on-going GOC discussions with the family (patient was previously trached for Guillian-[place] syndrome)  - Neuro Dr. Paula consulted  - spot EEG 9/25: Moderate to severe  nonspecific diffuse or multifocal cerebral dysfunction. No seizures  - repeat CTH negative: chrnoic R maxillary sinus opacifications w/ central funcal overgrowth and /or inpissated secretions  -surg consulted for trach + peg eval, not a candidate for PEG  - MRI initially planned for 9/28, however pt now comfort meaures    _________CVS___________  #Sepsis   -secondary to Spontaneous Bacterial Peritonitis  (confirmed on 9/15 with Diagnostic Para > 9000 Neutrophils)  - Peritoneal culture +streptococcus constellatus (9/15), blood cultures +staphylococcus capitis (9/15)  - completed piperacillin-tazobactam 9/24  - repeat blood cultures 9/23 NGTD    _________ RESP__________  #AHRF   - intubated on 9/16 for Increased work of breathing  - CT A/P/C: basilar atelectasis   - mechanically ventilated off all sedation   - ventilator settings minimal but mental status too poor for extubation  - SBT today 9/25  - palliative extubation 9/28    __________GI____________  #Alcoholic Cirrhosis  - multiple hospitalization for encephalopathy    - on spironolactone and lactulose at home   - hypotensive likely due to splanchnic vasodilation- off vasopressors 9/21, continue albumin 25% 50ml x2     - CTA/P showing ascites   - will hold spironolactone for now  - s/p diagnostic paracentesis 9/15- confirmed SBP         - piperacillin-tazobactam 3.375mg completed 9/24  - c/w octreotide and midodrine for HRS (9/21)  - rifaximin added (9/26)    #SBP  - confirmed- s/p diagnostic paracentesis in ED on 9/15 with > 9000 neutrophils   - Peritoneal culture +streptococcus constellatus (9/15)  - s/p albumin 25% 50ml completed 9/24  - completed piperacillin-tazobactam 9/24    #GI Bleed  BRBPR 9/25  H/H 7.9 > 6.7 > 1U > 8.2 > 7.9 > 7.8 (9/27)  trend H/H    ________ RENAL__________  #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 Oshea following  - continue octreotide and midodrine for HRS (9/21)  SCr: 1.78 > 1.7 (9/27)    _________MSK___________  Generalized weakness  No focal deficit     __________ID____________  #bacteremia  - coagulase negative staph on one set of blood cultures  - peritoneal cultures Strep Constellatus  - afebrile, no leukocytosis,  - completed piperacillin/tazobactam 3.375g 9/24  - repeat blood cultures 9/23 NGTD    _________ENDO__________  #no active issues    _______HEME/ONC_______  #microcytic Anemia  - likely multifactorial, in the setting of chronic illness and poor PO intake (nutritional deficiencies)  - continue to trend h/h  - continue to assess for signs of bleeding  BRBPR 9/25  H/H 7.9 > 6.7 > 1U >8.2 > 7.9 > 7.8    _________SKIN____________  -Multiple area of ecchymosis on UE  - fragile skin     ________Prophylaxis_______  #DVT   -SCDs, chemical ppx contraindicated  #GI  -pantoprazole 40mg IVP daily     ________GOC/ DISPO_______  - ICU    - comfort measures

## 2023-09-28 NOTE — PROGRESS NOTE ADULT - CRITICAL CARE ATTENDING COMMENT
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 As per wifehe has no  cough, runny nose. Patient has been having black stools for the past 2 days  with dark-colored urine. He also endorse chest pain, midsternal  Denies any hematemesis, fevers, shortness of breath, N/V/D.    On 9/16 ICU consulted for increased secretions, tachycardia, tachypnea, at risk for airway compromise with acute hypoxic resp failure using accessory muscle, intubated for WOB.      ASSESSMENT   - Acute Hypoxic Resp failure   - Acute encephalopathy   - Hypotension   - KAY on CKD with possibility for Hepatorenal syndrome   - Liver cirrhosis   - SBP  - Sepsis       Plan   - Eyes open but does not track or follows commands  - SBT as tolerated daily  - Monitor neurologic status  - repeat CT head 9/26 with out acute infarcts  - Continue vent support   - Hemodynamic support   - IV antibx to cover GNR in SBP prophylaxis  - F/U cultures and peritoneal fluid --> BCx NGTD. F/u repeat BCx 9/23  - Hepatology recs appreciated, midorine and octreotide for possible HRS  - Slight improving in serum creatinine  - Nephrology c/s  - No response to albumin   - cont. Rifaximin  - No evidence of bleeding   - EGD in June without esophageal varices   - No obvious bleeding at this time but with downtrending H/H; repeat this PM. FOBT  - NGT in place TF resumed. Hold if e/o bleed or continued downtrending Hb  - Monitor H/H, keep active T&S  - remains encephalopathic   - EEG w/o non convulsive status  - On going discussions with the family, if not weanable off the vent may need tracheostomy/peg placement  - DVT ppx with SCDs  - Cont. ICU care.
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 As per wifehe has no  cough, runny nose. Patient has been having black stools for the past 2 days  with dark-colored urine. He also endorse chest pain, midsternal  Denies any hematemesis, fevers, shortness of breath, N/V/D.    On 9/16 ICU consulted for increased secretions, tachycardia, tachypnea, at risk for airway compromise with acute hypoxic resp failure using accessory muscle, intubated for WOB.      ASSESSMENT   - Acute Hypoxic Resp failure   - Acute encephalopathy   - Hypotension   - KAY on CKD with possibility for Hepatorenal syndrome   - Liver cirrhosis   - SBP  - Sepsis       Plan   - SAT/SBT as tolerated daily  - Monitor neurologic status, given no significant improvement in mentation, obtain CT head   - Continue vent support , adjust as per ABG   - Hemodynamic support   - Titrate vaso-pressors to maintain MAP>65 as needed --> off pressors  - IV antibx to cover GNR in SBP  - F/U cultures and peritoneal fluid --> BCx NGTD. Repeat today  - Hepatology recs appreciated, midorine and octreotide for possible HRS  - Nephrology c/s  - No response to albumin   - Lactulose rectally   - KUB with mild colonic ileus, now passing stool  - POCUS, monitor UOP closely  - EGD in June without esophageal varices   - No obvious bleeding at this time but with downtrending H/H; repeat this PM. FOBT  - NGT in place TF resumed  - DVT ppx with SCDs  - Cont. ICU care.
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 As per wifehe has no  cough, runny nose. Patient has been having black stools for the past 2 days  with dark-colored urine. He also endorse chest pain, midsternal  Denies any hematemesis, fevers, shortness of breath, N/V/D.    On 9/16 ICU consulted for increased secretions, tachycardia, tachypnea, at risk for airway compromise with acute hypoxic resp failure using accessory muscle, intubated for WOB.      ASSESSMENT   - Acute Hypoxic Resp failure   - Acute encephalopathy   - Hypotension   - KAY on CKD with possibility for Hepatorenal syndrome   - Liver cirrhosis   - SBP  - Sepsis       Plan   - SAT/SBT as tolerated daily  - Monitor neurologic status; CTH 9/25 unremarkable  - Continue vent support , adjust as per ABG   - Hemodynamic support   - Titrate vaso-pressors to maintain MAP>65 as needed --> off pressors  - IV antibx to cover GNR in SBP  - F/U cultures and peritoneal fluid --> BCx NGTD. F/u repeat BCx 9/23  - Hepatology recs appreciated, midorine and octreotide for possible HRS  - Nephrology c/s  - No response to albumin   - Lactulose rectally   - KUB with mild colonic ileus, now passing stool  - POCUS, monitor UOP closely  - EGD in June without esophageal varices   - No obvious bleeding at this time but with downtrending H/H; repeat this PM. FOBT  - NGT in place TF resumed. Hold if e/o bleed or continued downtrending Hb  - Monitor H/H, keep active T&S  - DVT ppx with SCDs  - Cont. ICU care.
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 As per wifehe has no  cough, runny nose. Patient has been having black stools for the past 2 days  with dark-colored urine. He also endorse chest pain, midsternal  Denies any hematemesis, fevers, shortness of breath, N/V/D.    On 9/16 ICU consulted for increased secretions, tachycardia, tachypnea, at risk for airway compromise with acute hypoxic resp failure using accessory muscle, intubated for WOB.      ASSESSMENT   - Acute Hypoxic Resp failure   - Acute encephalopathy   - Hypotension   - KAY on CKD with possibility for Hepatorenal syndrome   - Liver cirrhosis   - SBP  - Sepsis       Plan   - SAT/SBT as tolerated daily  - Monitor neurologic status; CTH 9/25 unremarkable  - repeat CT head today with out acute infarcts  - Continue vent support   - Hemodynamic support   - IV antibx to cover GNR in SBP  - May need prophylaxis for SBP   - F/U cultures and peritoneal fluid --> BCx NGTD. F/u repeat BCx 9/23  - Hepatology recs appreciated, midorine and octreotide for possible HRS  - Slight improving in serum creatinine  - Nephrology c/s  - No response to albumin   - cont. Rifaximin  - KUB with mild colonic ileus, now passing stool  - POCUS, monitor UOP closely  - EGD in June without esophageal varices   - No obvious bleeding at this time but with downtrending H/H; repeat this PM. FOBT  - NGT in place TF resumed. Hold if e/o bleed or continued downtrending Hb  - Monitor H/H, keep active T&S  - remains encephalopathic   - EEG w/o non convulsive status  - DVT ppx with SCDs  - Cont. ICU care.
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 As per wifehe has no  cough, runny nose. Patient has been having black stools for the past 2 days  with dark-colored urine. He also endorse chest pain, midsternal  Denies any hematemesis, fevers, shortness of breath, N/V/D.    On 9/16 ICU consulted for increased secretions, tachycardia, tachypnea, at risk for airway compromise with acute hypoxic resp failure using accessory muscle, intubated for WOB.      ASSESSMENT   - Acute Hypoxic Resp failure   - Acute encephalopathy   - Hypotension   - KAY on CKD with possibility for Hepatorenal syndrome   - Liver cirrhosis   - SBP  - Sepsis       Plan   - Overnight with worsening clinical status, with worsening shock and organ failure  - Progressing to worsening multiple organ failure  - Evidence of skin failure as well due to actively dying state   - At this time family has opted for comfort measures only and withdrawal off the mechanical ventilator  - Patient palliatively weaned off today
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 As per wifehe has no  cough, runny nose. Patient has been having black stools for the past 2 days  with dark-colored urine. He also endorse chest pain, midsternal  Denies any hematemesis, fevers, shortness of breath, N/V/D.    On 9/16 ICU consulted for increased secretions, tachycardia, tachypnea, at risk for airway compromise with acute hypoxic resp failure using accessory muscle, intubated for WOB.      ASSESSMENT   - Acute Hypoxic Resp failure   - Acute encephalopathy   - Hypotension   - KAY on CKD with possibility for Hepatorenal syndrome   - Liver cirrhosis   - SBP  - Sepsis       Plan   - SAT/SBT as tolerated daily  - Monitor neurologic status; CTH 9/25 unremarkable  - Continue vent support , adjust as per ABG   - Hemodynamic support   - Titrate vaso-pressors to maintain MAP>65 as needed --> off pressors  - IV antibx to cover GNR in SBP  - May need prophylaxis for SBP   - F/U cultures and peritoneal fluid --> BCx NGTD. F/u repeat BCx 9/23  - Hepatology recs appreciated, midorine and octreotide for possible HRS  - Nephrology c/s  - No response to albumin   - Start Rifaximin  - KUB with mild colonic ileus, now passing stool  - POCUS, monitor UOP closely  - EGD in June without esophageal varices   - No obvious bleeding at this time but with downtrending H/H; repeat this PM. FOBT  - NGT in place TF resumed. Hold if e/o bleed or continued downtrending Hb  - Monitor H/H, keep active T&S  - remains encephalopathic   - Obtain neurology consult  - Check EEG to r/o non convulsive status  - DVT ppx with SCDs  - Cont. ICU care.

## 2023-09-28 NOTE — PROGRESS NOTE ADULT - PROVIDER SPECIALTY LIST ADULT
Critical Care
Hepatology
Nephrology
Critical Care
Hepatology
Infectious Disease
Internal Medicine
Nephrology
Nephrology
Critical Care
Hepatology
Hepatology
Nephrology
Neurology
Palliative Care
Critical Care
Hepatology
Infectious Disease
Nephrology
Critical Care

## 2023-09-28 NOTE — PROGRESS NOTE ADULT - SUBJECTIVE AND OBJECTIVE BOX
INTERVAL HPI/OVERNIGHT EVENTS: ***    PRESSORS: [ ] YES [ ] NO  WHICH:    Antimicrobial:  cefTRIAXone   IVPB 1000 milliGRAM(s) IV Intermittent every 24 hours  rifAXIMin 550 milliGRAM(s) Oral two times a day    Cardiovascular:  midodrine 5 milliGRAM(s) Oral every 8 hours  norepinephrine Infusion 0.05 MICROgram(s)/kG/Min IV Continuous <Continuous>    Pulmonary:    Hematalogic:    Other:  acetaminophen     Tablet .. 650 milliGRAM(s) Oral every 6 hours PRN  albumin human 25% IVPB 50 milliLiter(s) IV Intermittent every 6 hours  chlorhexidine 0.12% Liquid 15 milliLiter(s) Oral Mucosa every 12 hours  chlorhexidine 2% Cloths 1 Application(s) Topical daily  octreotide  Injectable 200 MICROGram(s) SubCutaneous three times a day  pantoprazole  Injectable 40 milliGRAM(s) IV Push daily  sodium chloride 0.9% lock flush 10 milliLiter(s) IV Push every 1 hour PRN    acetaminophen     Tablet .. 650 milliGRAM(s) Oral every 6 hours PRN  albumin human 25% IVPB 50 milliLiter(s) IV Intermittent every 6 hours  cefTRIAXone   IVPB 1000 milliGRAM(s) IV Intermittent every 24 hours  chlorhexidine 0.12% Liquid 15 milliLiter(s) Oral Mucosa every 12 hours  chlorhexidine 2% Cloths 1 Application(s) Topical daily  midodrine 5 milliGRAM(s) Oral every 8 hours  norepinephrine Infusion 0.05 MICROgram(s)/kG/Min IV Continuous <Continuous>  octreotide  Injectable 200 MICROGram(s) SubCutaneous three times a day  pantoprazole  Injectable 40 milliGRAM(s) IV Push daily  rifAXIMin 550 milliGRAM(s) Oral two times a day  sodium chloride 0.9% lock flush 10 milliLiter(s) IV Push every 1 hour PRN    Drug Dosing Weight  Height (cm): 170.2 (15 Sep 2023 11:37)  Weight (kg): 64.7 (16 Sep 2023 16:30)  BMI (kg/m2): 22.3 (16 Sep 2023 16:30)  BSA (m2): 1.75 (16 Sep 2023 16:30)    CENTRAL LINE: [ ] YES [ ] NO  LOCATION:   DATE INSERTED:  REMOVE: [ ] YES [ ] NO  EXPLAIN:    REYNOLDS: [ ] YES [ ] NO    DATE INSERTED:  REMOVE:  [ ] YES [ ] NO  EXPLAIN:    A-LINE:  [ ] YES [ ] NO  LOCATION:   DATE INSERTED:  REMOVE:  [ ] YES [ ] NO  EXPLAIN:    PMH -reviewed admission note, no change since admission  PAST MEDICAL & SURGICAL HISTORY:  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      Liver cirrhosis  alcoholic/WALL cirrhosis c/b variceal bleed s/p banding (8/2018) and hepatic encephalopathy (several years ago)      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)      Pancreatitis  2/2 choledocholithiasis s/p ERCP with stone extraction and plastic stent placement (11/2019, stent now removed)      Hematochezia  2/2 colonic AVMs s/p cauterization and hemorrhoids (11/2019)      Melena  2/2 duodenal ulcers s/p argon plasma coagulation (4/2020)      Chronic kidney disease (CKD)      GERD (gastroesophageal reflux disease)      Hypothyroidism      H/O inguinal hernia repair      History of repair of hip fracture  R hip      History of hip replacement  10/2020      H/O spinal stenosis          ICU Vital Signs Last 24 Hrs  T(C): 35.6 (28 Sep 2023 04:00), Max: 36.2 (27 Sep 2023 15:00)  T(F): 96.1 (28 Sep 2023 04:00), Max: 97.2 (27 Sep 2023 15:00)  HR: 97 (28 Sep 2023 04:06) (40 - 117)  BP: 89/57 (28 Sep 2023 04:00) (71/51 - 136/69)  BP(mean): 68 (28 Sep 2023 04:00) (59 - 93)  ABP: --  ABP(mean): --  RR: 24 (28 Sep 2023 04:00) (18 - 27)  SpO2: 100% (28 Sep 2023 04:06) (87% - 100%)    O2 Parameters below as of 27 Sep 2023 20:00  Patient On (Oxygen Delivery Method): ventilator    O2 Concentration (%): 80              09-27 @ 07:01  -  09-28 @ 07:00  --------------------------------------------------------  IN: 1756 mL / OUT: 385 mL / NET: 1371 mL        Mode: AC/ CMV (Assist Control/ Continuous Mandatory Ventilation)  RR (machine): 16  TV (machine): 400  FiO2: 60  PEEP: 5  ITime: 1  MAP: 6  PIP: 14      PHYSICAL EXAM:    GENERAL: NAD, well-groomed, well-developed  HEAD:  Atraumatic, Normocephalic  EYES: EOMI, PERRLA, conjunctiva and sclera clear  ENMT: No tonsillar erythema, exudates, or enlargement; Moist mucous membranes, Good dentition, No lesions  NECK: Supple, normal appearance, No JVD; Normal thyroid; Trachea midline  NERVOUS SYSTEM:  Alert & Oriented X3,  Motor Strength 5/5 B/L upper and lower extremities; DTRs 2+ intact and symmetric  CHEST/LUNG: No chest deformity; Normal percussion bilaterally; No rales, rhonchi, wheezing   HEART: Regular rate and rhythm; No murmurs, rubs, or gallops  ABDOMEN: Soft, Nontender, Nondistended; Bowel sounds present  EXTREMITIES:  2+ Peripheral Pulses, No clubbing, cyanosis, or edema  LYMPH: No lymphadenopathy noted  SKIN: No rashes or lesions;  Good capillary refill      LABS:  CBC Full  -  ( 28 Sep 2023 04:09 )  WBC Count : 18.92 K/uL  RBC Count : 2.66 M/uL  Hemoglobin : 7.5 g/dL  Hematocrit : 23.4 %  Platelet Count - Automated : 137 K/uL  Mean Cell Volume : 88.0 fl  Mean Cell Hemoglobin : 28.2 pg  Mean Cell Hemoglobin Concentration : 32.1 gm/dL  Auto Neutrophil # : x  Auto Lymphocyte # : x  Auto Monocyte # : x  Auto Eosinophil # : x  Auto Basophil # : x  Auto Neutrophil % : x  Auto Lymphocyte % : x  Auto Monocyte % : x  Auto Eosinophil % : x  Auto Basophil % : x    09-28    137  |  108  |  54<H>  ----------------------------<  176<H>  5.2   |  18<L>  |  1.91<H>    Ca    8.9      28 Sep 2023 04:09  Phos  7.5     09-28  Mg     2.3     09-28    TPro  6.6  /  Alb  3.4<L>  /  TBili  2.1<H>  /  DBili  x   /  AST  13  /  ALT  12  /  AlkPhos  81  09-28    PT/INR - ( 28 Sep 2023 04:09 )   PT: 21.0 sec;   INR: 1.88 ratio         PTT - ( 28 Sep 2023 04:09 )  PTT:49.2 sec  Urinalysis Basic - ( 28 Sep 2023 04:09 )    Color: x / Appearance: x / SG: x / pH: x  Gluc: 176 mg/dL / Ketone: x  / Bili: x / Urobili: x   Blood: x / Protein: x / Nitrite: x   Leuk Esterase: x / RBC: x / WBC x   Sq Epi: x / Non Sq Epi: x / Bacteria: x          RADIOLOGY & ADDITIONAL STUDIES REVIEWED:  ***    [ ]GOALS OF CARE DISCUSSION WITH PATIENT/FAMILY/PROXY:    CRITICAL CARE TIME SPENT: 35 minutes INTERVAL HPI/OVERNIGHT EVENTS: Overnight, pt had multiple tachy-diamante episodes with HR down to 30's. Pt seen at bedside, noted to be agonal breathing, intubated, off sedation, AAOx0, unable to follow commands.     PRESSORS: [x] YES [ ] NO  WHICH: levo    Antimicrobial:  cefTRIAXone   IVPB 1000 milliGRAM(s) IV Intermittent every 24 hours  rifAXIMin 550 milliGRAM(s) Oral two times a day    Cardiovascular:  midodrine 5 milliGRAM(s) Oral every 8 hours  norepinephrine Infusion 0.05 MICROgram(s)/kG/Min IV Continuous <Continuous>    Pulmonary:    Hematalogic:    Other:  acetaminophen     Tablet .. 650 milliGRAM(s) Oral every 6 hours PRN  albumin human 25% IVPB 50 milliLiter(s) IV Intermittent every 6 hours  chlorhexidine 0.12% Liquid 15 milliLiter(s) Oral Mucosa every 12 hours  chlorhexidine 2% Cloths 1 Application(s) Topical daily  octreotide  Injectable 200 MICROGram(s) SubCutaneous three times a day  pantoprazole  Injectable 40 milliGRAM(s) IV Push daily  sodium chloride 0.9% lock flush 10 milliLiter(s) IV Push every 1 hour PRN    acetaminophen     Tablet .. 650 milliGRAM(s) Oral every 6 hours PRN  albumin human 25% IVPB 50 milliLiter(s) IV Intermittent every 6 hours  cefTRIAXone   IVPB 1000 milliGRAM(s) IV Intermittent every 24 hours  chlorhexidine 0.12% Liquid 15 milliLiter(s) Oral Mucosa every 12 hours  chlorhexidine 2% Cloths 1 Application(s) Topical daily  midodrine 5 milliGRAM(s) Oral every 8 hours  norepinephrine Infusion 0.05 MICROgram(s)/kG/Min IV Continuous <Continuous>  octreotide  Injectable 200 MICROGram(s) SubCutaneous three times a day  pantoprazole  Injectable 40 milliGRAM(s) IV Push daily  rifAXIMin 550 milliGRAM(s) Oral two times a day  sodium chloride 0.9% lock flush 10 milliLiter(s) IV Push every 1 hour PRN    Drug Dosing Weight  Height (cm): 170.2 (15 Sep 2023 11:37)  Weight (kg): 64.7 (16 Sep 2023 16:30)  BMI (kg/m2): 22.3 (16 Sep 2023 16:30)  BSA (m2): 1.75 (16 Sep 2023 16:30)    CENTRAL LINE: [ ] YES [ ] NO  LOCATION:   DATE INSERTED:  REMOVE: [ ] YES [ ] NO  EXPLAIN:    REYNOLDS: [ ] YES [ ] NO    DATE INSERTED:  REMOVE:  [ ] YES [ ] NO  EXPLAIN:    A-LINE:  [ ] YES [ ] NO  LOCATION:   DATE INSERTED:  REMOVE:  [ ] YES [ ] NO  EXPLAIN:    PMH -reviewed admission note, no change since admission  PAST MEDICAL & SURGICAL HISTORY:  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      Liver cirrhosis  alcoholic/WALL cirrhosis c/b variceal bleed s/p banding (8/2018) and hepatic encephalopathy (several years ago)      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)      Pancreatitis  2/2 choledocholithiasis s/p ERCP with stone extraction and plastic stent placement (11/2019, stent now removed)      Hematochezia  2/2 colonic AVMs s/p cauterization and hemorrhoids (11/2019)      Melena  2/2 duodenal ulcers s/p argon plasma coagulation (4/2020)      Chronic kidney disease (CKD)      GERD (gastroesophageal reflux disease)      Hypothyroidism      H/O inguinal hernia repair      History of repair of hip fracture  R hip      History of hip replacement  10/2020      H/O spinal stenosis          ICU Vital Signs Last 24 Hrs  T(C): 35.6 (28 Sep 2023 04:00), Max: 36.2 (27 Sep 2023 15:00)  T(F): 96.1 (28 Sep 2023 04:00), Max: 97.2 (27 Sep 2023 15:00)  HR: 97 (28 Sep 2023 04:06) (40 - 117)  BP: 89/57 (28 Sep 2023 04:00) (71/51 - 136/69)  BP(mean): 68 (28 Sep 2023 04:00) (59 - 93)  ABP: --  ABP(mean): --  RR: 24 (28 Sep 2023 04:00) (18 - 27)  SpO2: 100% (28 Sep 2023 04:06) (87% - 100%)    O2 Parameters below as of 27 Sep 2023 20:00  Patient On (Oxygen Delivery Method): ventilator    O2 Concentration (%): 80              09-27 @ 07:01  -  09-28 @ 07:00  --------------------------------------------------------  IN: 1756 mL / OUT: 385 mL / NET: 1371 mL        Mode: AC/ CMV (Assist Control/ Continuous Mandatory Ventilation)  RR (machine): 16  TV (machine): 400  FiO2: 60  PEEP: 5  ITime: 1  MAP: 6  PIP: 14      PHYSICAL EXAM:    GENERAL: NAD, minimally responsive to voice, AAOX0, not following commands  HEAD:  Atraumatic, Normocephalic  EYES: EOMI, PERRLA, 3mm and brisk, conjunctiva and sclera clear  NECK: Supple, normal appearance, No JVD; Normal thyroid; Trachea midline  NERVOUS SYSTEM:  Alert & Oriented X0, no effort to move noted, limited passive ROM  CHEST/LUNG: diminished to auscultation bilaterally; No rales, rhonchi, wheezing   HEART: Regular rate and rhythm; No murmurs, rubs, or gallops  ABDOMEN: Distended, soft, hypoactive bowel sounds  EXTREMITIES:  2+ Peripheral Pulses, No clubbing, cyanosis, +2 generalized edema  SKIN: No rashes or lesions;  brisk capillary refill, UE ecchymosis noted, weeping upper       LABS:  CBC Full  -  ( 28 Sep 2023 04:09 )  WBC Count : 18.92 K/uL  RBC Count : 2.66 M/uL  Hemoglobin : 7.5 g/dL  Hematocrit : 23.4 %  Platelet Count - Automated : 137 K/uL  Mean Cell Volume : 88.0 fl  Mean Cell Hemoglobin : 28.2 pg  Mean Cell Hemoglobin Concentration : 32.1 gm/dL  Auto Neutrophil # : x  Auto Lymphocyte # : x  Auto Monocyte # : x  Auto Eosinophil # : x  Auto Basophil # : x  Auto Neutrophil % : x  Auto Lymphocyte % : x  Auto Monocyte % : x  Auto Eosinophil % : x  Auto Basophil % : x    09-28    137  |  108  |  54<H>  ----------------------------<  176<H>  5.2   |  18<L>  |  1.91<H>    Ca    8.9      28 Sep 2023 04:09  Phos  7.5     09-28  Mg     2.3     09-28    TPro  6.6  /  Alb  3.4<L>  /  TBili  2.1<H>  /  DBili  x   /  AST  13  /  ALT  12  /  AlkPhos  81  09-28    PT/INR - ( 28 Sep 2023 04:09 )   PT: 21.0 sec;   INR: 1.88 ratio         PTT - ( 28 Sep 2023 04:09 )  PTT:49.2 sec  Urinalysis Basic - ( 28 Sep 2023 04:09 )    Color: x / Appearance: x / SG: x / pH: x  Gluc: 176 mg/dL / Ketone: x  / Bili: x / Urobili: x   Blood: x / Protein: x / Nitrite: x   Leuk Esterase: x / RBC: x / WBC x   Sq Epi: x / Non Sq Epi: x / Bacteria: x          RADIOLOGY & ADDITIONAL STUDIES REVIEWED:  ***    [ ]GOALS OF CARE DISCUSSION WITH PATIENT/FAMILY/PROXY:    CRITICAL CARE TIME SPENT: 35 minutes INTERVAL HPI/OVERNIGHT EVENTS: Overnight, pt had multiple tachy-diamante episodes with HR down to 30's. Pt seen at bedside, noted to be agonal breathing, intubated, off sedation, AAOx0, unable to follow commands.     PRESSORS: [x] YES [ ] NO  WHICH: levo    Antimicrobial:  cefTRIAXone   IVPB 1000 milliGRAM(s) IV Intermittent every 24 hours  rifAXIMin 550 milliGRAM(s) Oral two times a day    Cardiovascular:  midodrine 5 milliGRAM(s) Oral every 8 hours  norepinephrine Infusion 0.05 MICROgram(s)/kG/Min IV Continuous <Continuous>    Pulmonary:    Hematalogic:    Other:  acetaminophen     Tablet .. 650 milliGRAM(s) Oral every 6 hours PRN  albumin human 25% IVPB 50 milliLiter(s) IV Intermittent every 6 hours  chlorhexidine 0.12% Liquid 15 milliLiter(s) Oral Mucosa every 12 hours  chlorhexidine 2% Cloths 1 Application(s) Topical daily  octreotide  Injectable 200 MICROGram(s) SubCutaneous three times a day  pantoprazole  Injectable 40 milliGRAM(s) IV Push daily  sodium chloride 0.9% lock flush 10 milliLiter(s) IV Push every 1 hour PRN    acetaminophen     Tablet .. 650 milliGRAM(s) Oral every 6 hours PRN  albumin human 25% IVPB 50 milliLiter(s) IV Intermittent every 6 hours  cefTRIAXone   IVPB 1000 milliGRAM(s) IV Intermittent every 24 hours  chlorhexidine 0.12% Liquid 15 milliLiter(s) Oral Mucosa every 12 hours  chlorhexidine 2% Cloths 1 Application(s) Topical daily  midodrine 5 milliGRAM(s) Oral every 8 hours  norepinephrine Infusion 0.05 MICROgram(s)/kG/Min IV Continuous <Continuous>  octreotide  Injectable 200 MICROGram(s) SubCutaneous three times a day  pantoprazole  Injectable 40 milliGRAM(s) IV Push daily  rifAXIMin 550 milliGRAM(s) Oral two times a day  sodium chloride 0.9% lock flush 10 milliLiter(s) IV Push every 1 hour PRN    Drug Dosing Weight  Height (cm): 170.2 (15 Sep 2023 11:37)  Weight (kg): 64.7 (16 Sep 2023 16:30)  BMI (kg/m2): 22.3 (16 Sep 2023 16:30)  BSA (m2): 1.75 (16 Sep 2023 16:30)    CENTRAL LINE: [ ] YES [ ] NO  LOCATION:   DATE INSERTED:  REMOVE: [ ] YES [ ] NO  EXPLAIN:    REYNOLDS: [ ] YES [ ] NO    DATE INSERTED:  REMOVE:  [ ] YES [ ] NO  EXPLAIN:    A-LINE:  [ ] YES [ ] NO  LOCATION:   DATE INSERTED:  REMOVE:  [ ] YES [ ] NO  EXPLAIN:    PMH -reviewed admission note, no change since admission  PAST MEDICAL & SURGICAL HISTORY:  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      Liver cirrhosis  alcoholic/WALL cirrhosis c/b variceal bleed s/p banding (8/2018) and hepatic encephalopathy (several years ago)      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)      Pancreatitis  2/2 choledocholithiasis s/p ERCP with stone extraction and plastic stent placement (11/2019, stent now removed)      Hematochezia  2/2 colonic AVMs s/p cauterization and hemorrhoids (11/2019)      Melena  2/2 duodenal ulcers s/p argon plasma coagulation (4/2020)      Chronic kidney disease (CKD)      GERD (gastroesophageal reflux disease)      Hypothyroidism      H/O inguinal hernia repair      History of repair of hip fracture  R hip      History of hip replacement  10/2020      H/O spinal stenosis          ICU Vital Signs Last 24 Hrs  T(C): 35.6 (28 Sep 2023 04:00), Max: 36.2 (27 Sep 2023 15:00)  T(F): 96.1 (28 Sep 2023 04:00), Max: 97.2 (27 Sep 2023 15:00)  HR: 97 (28 Sep 2023 04:06) (40 - 117)  BP: 89/57 (28 Sep 2023 04:00) (71/51 - 136/69)  BP(mean): 68 (28 Sep 2023 04:00) (59 - 93)  ABP: --  ABP(mean): --  RR: 24 (28 Sep 2023 04:00) (18 - 27)  SpO2: 100% (28 Sep 2023 04:06) (87% - 100%)    O2 Parameters below as of 27 Sep 2023 20:00  Patient On (Oxygen Delivery Method): ventilator    O2 Concentration (%): 80              09-27 @ 07:01  -  09-28 @ 07:00  --------------------------------------------------------  IN: 1756 mL / OUT: 385 mL / NET: 1371 mL        Mode: AC/ CMV (Assist Control/ Continuous Mandatory Ventilation)  RR (machine): 16  TV (machine): 400  FiO2: 60  PEEP: 5  ITime: 1  MAP: 6  PIP: 14      PHYSICAL EXAM:    GENERAL: NAD, minimally responsive to voice, AAOX0, not following commands  HEAD:  Atraumatic, Normocephalic  EYES: EOMI, PERRLA, 3mm and brisk, conjunctiva and sclera clear  NECK: Supple, normal appearance, No JVD; Normal thyroid; Trachea midline  NERVOUS SYSTEM:  Alert & Oriented X0, no effort to move noted, limited passive ROM  CHEST/LUNG: diminished to auscultation bilaterally; No rales, rhonchi, wheezing   HEART: Regular rate and rhythm; No murmurs, rubs, or gallops  ABDOMEN: Distended, soft, hypoactive bowel sounds  EXTREMITIES:  2+ Peripheral Pulses, No clubbing, cyanosis, +2 generalized edema  SKIN: UE ecchymosis noted, weeping upper       LABS:  CBC Full  -  ( 28 Sep 2023 04:09 )  WBC Count : 18.92 K/uL  RBC Count : 2.66 M/uL  Hemoglobin : 7.5 g/dL  Hematocrit : 23.4 %  Platelet Count - Automated : 137 K/uL  Mean Cell Volume : 88.0 fl  Mean Cell Hemoglobin : 28.2 pg  Mean Cell Hemoglobin Concentration : 32.1 gm/dL  Auto Neutrophil # : x  Auto Lymphocyte # : x  Auto Monocyte # : x  Auto Eosinophil # : x  Auto Basophil # : x  Auto Neutrophil % : x  Auto Lymphocyte % : x  Auto Monocyte % : x  Auto Eosinophil % : x  Auto Basophil % : x    09-28    137  |  108  |  54<H>  ----------------------------<  176<H>  5.2   |  18<L>  |  1.91<H>    Ca    8.9      28 Sep 2023 04:09  Phos  7.5     09-28  Mg     2.3     09-28    TPro  6.6  /  Alb  3.4<L>  /  TBili  2.1<H>  /  DBili  x   /  AST  13  /  ALT  12  /  AlkPhos  81  09-28    PT/INR - ( 28 Sep 2023 04:09 )   PT: 21.0 sec;   INR: 1.88 ratio         PTT - ( 28 Sep 2023 04:09 )  PTT:49.2 sec  Urinalysis Basic - ( 28 Sep 2023 04:09 )    Color: x / Appearance: x / SG: x / pH: x  Gluc: 176 mg/dL / Ketone: x  / Bili: x / Urobili: x   Blood: x / Protein: x / Nitrite: x   Leuk Esterase: x / RBC: x / WBC x   Sq Epi: x / Non Sq Epi: x / Bacteria: x          RADIOLOGY & ADDITIONAL STUDIES REVIEWED:  ***    [ ]GOALS OF CARE DISCUSSION WITH PATIENT/FAMILY/PROXY:    CRITICAL CARE TIME SPENT: 35 minutes

## 2023-11-13 NOTE — ED ADULT TRIAGE NOTE - RESPIRATORY RATE (BREATHS/MIN)
11/13/23 1226   Prechemo Checklist   Has the patient been in the hospital, ED, or urgent care since last date of service No   Chemo/Immuno Consent Signed Yes   Protocol/Indications Verified Yes   Confirmed to previous date/time of medication Yes   Compared to previous dose Yes   Pregnancy Test Negative Not applicable   Parameters Met Yes   BSA/Weight-Height Verified Yes   Dose Calculations Verified Yes       
18

## 2024-01-23 NOTE — H&P ADULT - NSTOBACCOSCREENHP_GEN_A_CS
Patient ambulatory via IC to ED with complaint of fatigue for the past few days. Glucose levels elevated, no history of DM per patient.      Patient is AXOX4.      No

## 2024-01-31 NOTE — DIETITIAN INITIAL EVALUATION ADULT - PERTINENT LABORATORY DATA
Quality 431: Preventive Care And Screening: Unhealthy Alcohol Use - Screening: Patient not identified as an unhealthy alcohol user when screened for unhealthy alcohol use using a systematic screening method Detail Level: Detailed Additional Notes: The E/M service provided during this visit was medically necessary, significant, and independent from the procedure(s) provided on the same date of service and included documented elements above and beyond the usual pre-, intra-, and post-operative work associated with the procedure(s). Quality 130: Documentation Of Current Medications In The Medical Record: Current Medications Documented Quality 226: Preventive Care And Screening: Tobacco Use: Screening And Cessation Intervention: Patient screened for tobacco use and is an ex/non-smoker 09-18    137  |  108  |  26<H>  ----------------------------<  113<H>  3.8   |  16<L>  |  1.82<H>    Ca    8.5      18 Sep 2023 03:25  Phos  3.0     09-18  Mg     1.9     09-18    TPro  5.5<L>  /  Alb  2.4<L>  /  TBili  1.5<H>  /  DBili  x   /  AST  12  /  ALT  8<L>  /  AlkPhos  131<H>  09-18  POCT Blood Glucose.: 91 mg/dL (09-18-23 @ 11:15)

## 2024-02-24 NOTE — ED PROVIDER NOTE - NS ED ATTENDING STATEMENT MOD
normal sinus rhythm
I have personally performed a face to face diagnostic evaluation on this patient. I have reviewed the ACP note and agree with the history, exam and plan of care, except as noted.

## 2024-04-19 NOTE — H&P ADULT - I WAS PHYSICALLY PRESENT FOR THE KEY PORTIONS OF THE EVALUATION AND MANAGEMENT (E/M) SERVICE PROVIDED.  I AGREE WITH THE ABOVE HISTORY, PHYSICAL, AND PLAN WHICH I HAVE REVIEWED AND EDITED WHERE APPROPRIATE
LMOM for patient.  
Patient calling because he is having a gout flare up.  He said that its bad and that he can barely walk because of it.  He is asking if someone could call in colchicine for him to the [facility] on [address].  
Patient to be seen by our office for evaluation.  
Statement Selected

## 2024-05-13 NOTE — H&P ADULT - PROBLEM SELECTOR PROBLEM 4
Airway  Date/Time: 5/13/2024 7:53 AM  Urgency: elective    Airway not difficult    Staffing  Performed: BRITTANI   Authorized by: Bebo Ray MD    Performed by: BRITTANI Bhatia  Patient location during procedure: OR    Indications and Patient Condition  Indications for airway management: anesthesia  Spontaneous Ventilation: absent  Sedation level: deep  Preoxygenated: yes  Patient position: sniffing  Mask difficulty assessment: 1 - vent by mask    Final Airway Details  Final airway type: endotracheal airway      Successful airway: ETT  Cuffed: yes   Successful intubation technique: direct laryngoscopy  Facilitating devices/methods: intubating stylet  Endotracheal tube insertion site: oral  Blade type: insighter.  Blade size: medium.  ETT size (mm): 7.0  Cormack-Lehane Classification: grade I - full view of glottis  Placement verified by: chest auscultation and capnometry   Measured from: lips  Number of attempts at approach: 1           HTN (hypertension) Liver cirrhosis

## 2024-05-29 NOTE — PROCEDURE NOTE - NSPROCDETAILS_GEN_ALL_CORE
location identified, sterile technique used, catheter introduced, fluid drained
patient pre-oxygenated, tube inserted, placement confirmed
guidewire recovered/lumen(s) aspirated and flushed/sterile dressing applied/sterile technique, catheter placed/ultrasound guidance with use of sterile gel and probe cove
nasogastric/audible air bolus/placement confirmed by auscultation/bowel sounds present to 4 quadrants
location identified, draped/prepped, sterile technique used/blood seen on insertion/dressing applied/flushes easily/secured in place/sterile technique, catheter placed
No issues with nausea, vomiting, diarrhea reported at time of visit. States that he feels a bit constipated at times, last BM noted on 5/27 per flow sheets. Pt is not currently on a bowel regimen

## 2024-06-21 NOTE — ACUTE INTERFACILITY TRANSFER NOTE - NS AS DC PROVIDER CONTACT Y/N MULTI
[Courtesy Letter:] : I had the pleasure of seeing your patient, [unfilled], in my office today. [Sincerely,] : Sincerely, [FreeTextEntry2] : Dr. Trent Mac [address] [place] [address] [FreeTextEntry3] : Sunny Ceron MD Chief, Pediatric Otolaryngology [facility] and Isaura Jones [facility] Professor of Otolaryngology [employer] at [facility] Yes

## 2024-09-07 NOTE — PROVIDER CONTACT NOTE (OTHER) - SITUATION
/54, HR 58  due for 1800 propanolol.
BP after bolus is 138/58. Patient denies dizziness/headache.
HR 50
Patient refusing to be NPO. Patient has an upcoming procedure in which the patient has to be NPO for.
orthostatic bp while standing 71/34
patient with a blood pressure of 85/55
patient with a blood pressure of 85/55
No

## 2024-10-03 NOTE — ED ADULT NURSE NOTE - ED STAT RN HANDOFF DETAILS
Post-Op Assessment Note    CV Status:  Stable    Pain management: adequate      Post-op block assessment: no complications   Mental Status:  Alert and awake   Hydration Status:  Euvolemic   PONV Controlled:  Controlled   Airway Patency:  Patent     Post Op Vitals Reviewed: Yes    No anethesia notable event occurred.    Staff: Anesthesiologist, CRNA               BP      Temp      Pulse     Resp      SpO2         endorsed to next nurse Adaiora

## 2024-10-15 NOTE — PRE-OP CHECKLIST - LAST DOSE WITHIN LAST 24HRS
[Time Spent: ___ minutes] : I have spent [unfilled] minutes of time on the encounter which excludes teaching and separately reported services. Yes

## 2025-04-24 NOTE — DISCHARGE NOTE PROVIDER - NSDCQMERRANDS_GEN_ALL_CORE
Medication refill request received via pharmacy interface.    Date of Procedure: 4/10/2025  Procedure:  1.  Move external fixator right lower extremity  2.  Fluoroscopic evaluation of the stability of the right subtalar joint     Surgeon(s):  Jim Mcmullen MD    Medication pended and routed to providers for decision and signature.    Future Appointments   Date Time Provider Department Center   4/28/2025  1:00 PM SCHEDULE, [facility]       Electronically signed by Tavia Falcon ATC on 4/24/25 at 9:34 AM EDT      Yes

## 2025-06-10 NOTE — H&P ADULT - SKIN/BREAST
Patient states that he will not give a urine sample until he is given fluids.   
Pt educated on need for urine sample at this time and verbalized understanding of need. Urinal at bedside.   
negative

## (undated) DEVICE — PD GROUND 2 SURFIT AD LF

## (undated) DEVICE — TRAP SPECIMEN SPUTUM 40CC

## (undated) DEVICE — INJ SYS RAP REFIL

## (undated) DEVICE — LUBRICATING JELLY ONESHOT 1.25OZ

## (undated) DEVICE — VENODYNE/SCD SLEEVE CALF MEDIUM

## (undated) DEVICE — OLYMPUS DISTAL COVER ENDOSCOPE

## (undated) DEVICE — BRUSH CYTO DL ERCP 8FR 200CM

## (undated) DEVICE — TUBING CANNULA SALTER LABS NASAL ADULT 7FT

## (undated) DEVICE — SYR LUER LOK 50CC

## (undated) DEVICE — DRSG CURITY GAUZE SPONGE 4 X 4" 12-PLY

## (undated) DEVICE — LOK DVC RX AND BIOPSY

## (undated) DEVICE — SOL INJ NS 0.9% 500ML 1-PORT

## (undated) DEVICE — TUBING MEDI-VAC W MAXIGRIP CONNECTORS 1/4"X6'

## (undated) DEVICE — Device

## (undated) DEVICE — SYR LUER LOK 20CC

## (undated) DEVICE — MASK OXYGEN PANORAMIC

## (undated) DEVICE — OMNIPAQUE 300  30ML

## (undated) DEVICE — WRAP COMPRESSION CALF MED

## (undated) DEVICE — SNARE DISP

## (undated) DEVICE — SPHIN TL TRI-TOME 7FR MONO

## (undated) DEVICE — SNARE CAPTIVATOR II RND STIFF 15MM

## (undated) DEVICE — SOL IRR POUR H2O 500ML

## (undated) DEVICE — SYR LUER LOK 10CC

## (undated) DEVICE — KIT ENDO PROCEDURE CUST W/VLV

## (undated) DEVICE — SOLIDIFIER ISOLYZER 2000 CC

## (undated) DEVICE — BITE BLOCK ADULT 20 X 27MM (GREEN)

## (undated) DEVICE — BITE BLOCK MOUTHPCW/STRAP

## (undated) DEVICE — SENSOR O2 FINGER ADULT 24/CA

## (undated) DEVICE — OMNIPAQUE 350MG 200ML

## (undated) DEVICE — DVC AUTO CAP RX LOKG

## (undated) DEVICE — WARMING BLANKET FULL BODY MULTI-ACCESS

## (undated) DEVICE — BITE BLOCK SCOPE SAVER 20X27MM ADULT GREEN

## (undated) DEVICE — DRSG GAUZE 4X4"

## (undated) DEVICE — LUBE JELLY FOILPACK 36GM STERILE

## (undated) DEVICE — SENSOR O2 FINGER ADULT